# Patient Record
Sex: FEMALE | Race: WHITE | NOT HISPANIC OR LATINO | Employment: OTHER | URBAN - METROPOLITAN AREA
[De-identification: names, ages, dates, MRNs, and addresses within clinical notes are randomized per-mention and may not be internally consistent; named-entity substitution may affect disease eponyms.]

---

## 2017-01-04 ENCOUNTER — TELEPHONE (OUTPATIENT)
Dept: PHYSICAL MEDICINE AND REHAB | Facility: CLINIC | Age: 72
End: 2017-01-04

## 2017-01-04 NOTE — TELEPHONE ENCOUNTER
----- Message from Tahira Owen MA sent at 1/4/2017 11:40 AM CST -----  Contact: self      ----- Message -----     From: Linda Perez MA     Sent: 1/4/2017   9:06 AM       To: Pushpa Mcmahon MD, Tahira Owen MA        ----- Message -----     From: Cari Duncan     Sent: 1/4/2017   8:55 AM       To: [facility] Staff    Patient scheduled for MRI.  Upon asking patient questions from questionaire  Pt states has metal screw in neck and back.  I  was advised by MRI department to sent message to doctor.     Please call pt at [phone]

## 2017-01-06 DIAGNOSIS — G89.29 CHRONIC MIDLINE LOW BACK PAIN WITH BILATERAL SCIATICA: ICD-10-CM

## 2017-01-06 DIAGNOSIS — G89.29 CHRONIC MIDLINE LOW BACK PAIN WITH RIGHT-SIDED SCIATICA: ICD-10-CM

## 2017-01-06 DIAGNOSIS — M54.41 CHRONIC MIDLINE LOW BACK PAIN WITH RIGHT-SIDED SCIATICA: ICD-10-CM

## 2017-01-06 DIAGNOSIS — M54.2 CHRONIC NECK PAIN: ICD-10-CM

## 2017-01-06 DIAGNOSIS — M16.0 PRIMARY OSTEOARTHRITIS OF BOTH HIPS: ICD-10-CM

## 2017-01-06 DIAGNOSIS — M54.42 CHRONIC MIDLINE LOW BACK PAIN WITH BILATERAL SCIATICA: ICD-10-CM

## 2017-01-06 DIAGNOSIS — G89.29 CHRONIC NECK PAIN: ICD-10-CM

## 2017-01-06 DIAGNOSIS — M17.0 PRIMARY OSTEOARTHRITIS OF BOTH KNEES: ICD-10-CM

## 2017-01-06 DIAGNOSIS — M75.51 SUBDELTOID BURSITIS, RIGHT: ICD-10-CM

## 2017-01-06 DIAGNOSIS — M54.41 CHRONIC MIDLINE LOW BACK PAIN WITH BILATERAL SCIATICA: ICD-10-CM

## 2017-01-06 RX ORDER — OXYCODONE AND ACETAMINOPHEN 10; 325 MG/1; MG/1
TABLET ORAL
Qty: 120 TABLET | Refills: 0 | Status: SHIPPED | OUTPATIENT
Start: 2017-01-06 | End: 2017-02-09 | Stop reason: SDUPTHER

## 2017-02-09 DIAGNOSIS — M54.41 CHRONIC MIDLINE LOW BACK PAIN WITH BILATERAL SCIATICA: ICD-10-CM

## 2017-02-09 DIAGNOSIS — M16.0 PRIMARY OSTEOARTHRITIS OF BOTH HIPS: ICD-10-CM

## 2017-02-09 DIAGNOSIS — M54.2 CHRONIC NECK PAIN: ICD-10-CM

## 2017-02-09 DIAGNOSIS — M75.51 SUBDELTOID BURSITIS, RIGHT: ICD-10-CM

## 2017-02-09 DIAGNOSIS — G89.29 CHRONIC MIDLINE LOW BACK PAIN WITH BILATERAL SCIATICA: ICD-10-CM

## 2017-02-09 DIAGNOSIS — M17.0 PRIMARY OSTEOARTHRITIS OF BOTH KNEES: ICD-10-CM

## 2017-02-09 DIAGNOSIS — M54.41 CHRONIC MIDLINE LOW BACK PAIN WITH RIGHT-SIDED SCIATICA: ICD-10-CM

## 2017-02-09 DIAGNOSIS — M54.42 CHRONIC MIDLINE LOW BACK PAIN WITH BILATERAL SCIATICA: ICD-10-CM

## 2017-02-09 DIAGNOSIS — G89.29 CHRONIC MIDLINE LOW BACK PAIN WITH RIGHT-SIDED SCIATICA: ICD-10-CM

## 2017-02-09 DIAGNOSIS — G89.29 CHRONIC NECK PAIN: ICD-10-CM

## 2017-02-09 RX ORDER — TRAZODONE HYDROCHLORIDE 50 MG/1
TABLET ORAL
Qty: 60 TABLET | Refills: 1 | Status: SHIPPED | OUTPATIENT
Start: 2017-02-09 | End: 2017-06-09 | Stop reason: SDUPTHER

## 2017-02-09 RX ORDER — OXYCODONE AND ACETAMINOPHEN 10; 325 MG/1; MG/1
TABLET ORAL
Qty: 120 TABLET | Refills: 0 | Status: SHIPPED | OUTPATIENT
Start: 2017-02-09 | End: 2017-03-02 | Stop reason: SDUPTHER

## 2017-03-02 ENCOUNTER — OFFICE VISIT (OUTPATIENT)
Dept: PHYSICAL MEDICINE AND REHAB | Facility: CLINIC | Age: 72
End: 2017-03-02
Payer: MEDICARE

## 2017-03-02 VITALS
HEART RATE: 107 BPM | WEIGHT: 132.25 LBS | SYSTOLIC BLOOD PRESSURE: 158 MMHG | DIASTOLIC BLOOD PRESSURE: 82 MMHG | HEIGHT: 62 IN | BODY MASS INDEX: 24.34 KG/M2

## 2017-03-02 DIAGNOSIS — M17.0 PRIMARY OSTEOARTHRITIS OF BOTH KNEES: ICD-10-CM

## 2017-03-02 DIAGNOSIS — G89.29 CHRONIC NECK PAIN: ICD-10-CM

## 2017-03-02 DIAGNOSIS — M54.41 CHRONIC MIDLINE LOW BACK PAIN WITH RIGHT-SIDED SCIATICA: Primary | ICD-10-CM

## 2017-03-02 DIAGNOSIS — M96.1 CERVICAL POST-LAMINECTOMY SYNDROME: ICD-10-CM

## 2017-03-02 DIAGNOSIS — M16.0 PRIMARY OSTEOARTHRITIS OF BOTH HIPS: ICD-10-CM

## 2017-03-02 DIAGNOSIS — M54.16 RIGHT LUMBAR RADICULOPATHY: ICD-10-CM

## 2017-03-02 DIAGNOSIS — M54.2 CHRONIC NECK PAIN: ICD-10-CM

## 2017-03-02 DIAGNOSIS — M47.26 OSTEOARTHRITIS OF SPINE WITH RADICULOPATHY, LUMBAR REGION: ICD-10-CM

## 2017-03-02 DIAGNOSIS — M96.1 LUMBAR POSTLAMINECTOMY SYNDROME: ICD-10-CM

## 2017-03-02 DIAGNOSIS — G89.29 CHRONIC MIDLINE LOW BACK PAIN WITH RIGHT-SIDED SCIATICA: Primary | ICD-10-CM

## 2017-03-02 DIAGNOSIS — M75.51 SUBDELTOID BURSITIS, RIGHT: ICD-10-CM

## 2017-03-02 PROCEDURE — 1160F RVW MEDS BY RX/DR IN RCRD: CPT | Mod: S$GLB,,, | Performed by: PHYSICAL MEDICINE & REHABILITATION

## 2017-03-02 PROCEDURE — 1125F AMNT PAIN NOTED PAIN PRSNT: CPT | Mod: S$GLB,,, | Performed by: PHYSICAL MEDICINE & REHABILITATION

## 2017-03-02 PROCEDURE — 99999 PR PBB SHADOW E&M-EST. PATIENT-LVL III: CPT | Mod: PBBFAC,,, | Performed by: PHYSICAL MEDICINE & REHABILITATION

## 2017-03-02 PROCEDURE — 1159F MED LIST DOCD IN RCRD: CPT | Mod: S$GLB,,, | Performed by: PHYSICAL MEDICINE & REHABILITATION

## 2017-03-02 PROCEDURE — 1157F ADVNC CARE PLAN IN RCRD: CPT | Mod: S$GLB,,, | Performed by: PHYSICAL MEDICINE & REHABILITATION

## 2017-03-02 PROCEDURE — 99214 OFFICE O/P EST MOD 30 MIN: CPT | Mod: S$GLB,,, | Performed by: PHYSICAL MEDICINE & REHABILITATION

## 2017-03-02 RX ORDER — DULOXETIN HYDROCHLORIDE 30 MG/1
30 CAPSULE, DELAYED RELEASE ORAL DAILY
Qty: 30 CAPSULE | Refills: 1 | Status: SHIPPED | OUTPATIENT
Start: 2017-03-02 | End: 2017-09-07 | Stop reason: SINTOL

## 2017-03-02 RX ORDER — OXYCODONE AND ACETAMINOPHEN 10; 325 MG/1; MG/1
1 TABLET ORAL EVERY 6 HOURS
Qty: 120 TABLET | Refills: 0 | Status: SHIPPED | OUTPATIENT
Start: 2017-03-10 | End: 2017-04-12 | Stop reason: SDUPTHER

## 2017-03-02 NOTE — PROGRESS NOTES
Subjective:       Patient ID: Katie Quevedo is a 71 y.o. female.    Chief Complaint: No chief complaint on file.    HPI    HISTORY OF PRESENT ILLNESS:  Mrs. Quevedo is a 71-year-old white female who is   [facility]ed up in [facility] for chronic low back pain with   lumbar radiculopathy, post-laminectomy syndrome, chronic neck pain with cervical   radiculopathy, post-cervical laminectomy syndrome and recurrent right   subdeltoid bursitis.  Her last visit to the clinic was on 12/01/2016.  Her right   shoulder pain was worse.  An MRI of the right shoulder was ordered.  She was   maintained on venlafaxine, p.r.n. oxycodone/APAP, p.r.n. trazodone and p.r.n.   tizanidine.    The patient is coming today to the clinic for followup.  She is still taking   care of her sick father, which places a burden on her.  Her right shoulder pain   has been stable.  She did not have the chance to get MRI because she cannot   afford the time to take off from taking care of her father.  Her pain is in the   deltoid region.  It is a constant aching sensation.  It is aggravated by   shoulder movement and better with rest.  Her maximum pain is 10/10 and minimum   2/10.  Today, it is 2/10.    Her back pain has been stable.  It is a constant aching pain in the lumbar spine   and across her back.  She has occasional shooting pain to the right foot with   numbness.  Her pain is worse with activity and better with rest.  Her maximum   back pain is 10/10 and minimum 5/10.  Today, it is 8/10.  The patient complains   of right lower extremity, but without change from her baseline.  She has   occasional falls but without injuries.  She has been using a rolling walker.    Her neck pain has also been stable.  It is a constant aching pain in the   cervical spine.  It is usually localized.  Her pain is worse with activity and   better with rest.  Her maximum pain is 9/10 and minimum 2-3/10.  Today, it is   2-3/10.  The patient complains of mild  right upper extremity weakness.    She is currently taking oxycodone/APAP 10/325 p.r.n., usually four times per   day.  She takes tizanidine 4 mg p.r.n., usually twice per day.  She takes   trazodone 50 mg p.r.n. for sleep, usually two tablets at bedtime.  She was   previously taking venlafaxine XR, but she stopped it secondary to having weird   dreams.      MS/HN  dd: 03/02/2017 12:17:14 (CST)  td: 03/03/2017 03:01:48 (CST)  Doc ID   #[id number]  Job ID #[id number]    CC:         Review of Systems   Constitutional: Positive for fatigue.   Eyes: Negative for visual disturbance.   Respiratory: Negative for shortness of breath.    Cardiovascular: Negative for chest pain.   Gastrointestinal: Negative for constipation, nausea and vomiting.   Genitourinary: Negative for difficulty urinating.   Musculoskeletal: Positive for back pain and neck pain. Negative for gait problem.   Neurological: Negative for dizziness and headaches.   Psychiatric/Behavioral: Positive for sleep disturbance. Negative for behavioral problems.       Objective:      Physical Exam   Constitutional: She is oriented to person, place, and time. She appears well-developed and well-nourished.   Neck:   Decreased ROM.  Mild tenderness.   Musculoskeletal:   BUE:  ROM: decreased at shoulder abduction, Rt worse than Lt.  +ve Heberden's & Nilsa's nodes.  Strength:    RUE: 3+/5 at shoulder abduction, 5- elbow flexion, elbow extension, hand .   LUE: 4/5 at shoulder abduction, 5- elbow flexion, elbow extension, hand .  Sensation to pinprick:   RUE: mild decrease.   LUE: intact.    Impingement Signs:  Neer:  RUE: +ve    LUE: -ve  Lewis: RUE: +ve    LUE: -ve     BLE:  ROM:full.  +ve bilateral knee crepitus.   Strength:      RLE: 5/5 at hip flexion, knee extension, ankle DF/PF, EHL.     LLE:  5/5 at hip flexion, knee extension, ankle DF/PF, EHL.  Sensation to pinprick:     RLE: mild decrease.      LLE: mild decrease.   +ve tenderness over lumbar spine.      Neurological: She is alert and oriented to person, place, and time.   Psychiatric: She has a normal mood and affect.   Vitals reviewed.        Assessment:       1. Chronic midline low back pain with bilateral sciatica    2. Osteoarthritis of spine with radiculopathy, lumbar region    3. Right lumbar radiculopathy    4. Lumbar postlaminectomy syndrome    5. Cervical radiculopathy, BUE    6. Cervical post-laminectomy syndrome    7. Primary osteoarthritis of both knees    8. Primary osteoarthritis of both hips    9. Subdeltoid bursitis, right        Plan:     - Discontinue venlafaxine (due to 'weird dreams').  - Start duloxetine (CYMBALTA) 30 MG capsule; Take 1 capsule (30 mg total) by mouth once daily. In 1-2 weeks, if no relief, may increase dose to 2 capsules once daily. Call for refills.  - Continue oxycodone-acetaminophen (PERCOCET)  mg per tablet; Take 1 tablet by mouth every 6 (six) hours.  - A prescription for a rollator walker was given to the patient to help improve gait safety.  - Continue trazodone (DESYREL) 50 MG tablet; Take 1-2 tablets ( mg total) by mouth nightly as needed for Insomnia.  - Continue tizanidine (ZANAFLEX) 4 MG tablet; Take 1-2 tablets (4-8 mg total) by mouth 3 (three) times daily as needed.  - Regular home exercise program was encouraged.  - She can call for MRI of Right shoulder when ready.  - Return in about 3 months (around 6/2/2017).

## 2017-03-11 DIAGNOSIS — M75.51 SUBDELTOID BURSITIS, RIGHT: ICD-10-CM

## 2017-03-11 DIAGNOSIS — M54.2 CHRONIC NECK PAIN: ICD-10-CM

## 2017-03-11 DIAGNOSIS — G89.29 CHRONIC NECK PAIN: ICD-10-CM

## 2017-03-13 RX ORDER — TIZANIDINE 4 MG/1
TABLET ORAL
Qty: 120 TABLET | Refills: 1 | Status: SHIPPED | OUTPATIENT
Start: 2017-03-13 | End: 2017-05-02 | Stop reason: SDUPTHER

## 2017-04-12 DIAGNOSIS — M75.51 SUBDELTOID BURSITIS, RIGHT: ICD-10-CM

## 2017-04-12 DIAGNOSIS — M54.41 CHRONIC MIDLINE LOW BACK PAIN WITH RIGHT-SIDED SCIATICA: ICD-10-CM

## 2017-04-12 DIAGNOSIS — M54.2 CHRONIC NECK PAIN: ICD-10-CM

## 2017-04-12 DIAGNOSIS — M17.0 PRIMARY OSTEOARTHRITIS OF BOTH KNEES: ICD-10-CM

## 2017-04-12 DIAGNOSIS — G89.29 CHRONIC MIDLINE LOW BACK PAIN WITH RIGHT-SIDED SCIATICA: ICD-10-CM

## 2017-04-12 DIAGNOSIS — M16.0 PRIMARY OSTEOARTHRITIS OF BOTH HIPS: ICD-10-CM

## 2017-04-12 DIAGNOSIS — G89.29 CHRONIC NECK PAIN: ICD-10-CM

## 2017-04-12 NOTE — TELEPHONE ENCOUNTER
03/02/17 last Office visit  03/02/17 last Rx refill  06/05/17 RTC    Pt is calling for a refill of oxycodone-acetaminophen (PERCOCET)  mg.thanks     [facility] - [facility] - [address]   [address]   [place] [address]   Phone: [phone] Fax: [phone]

## 2017-04-13 ENCOUNTER — PATIENT MESSAGE (OUTPATIENT)
Dept: PHYSICAL MEDICINE AND REHAB | Facility: CLINIC | Age: 72
End: 2017-04-13

## 2017-04-13 RX ORDER — OXYCODONE AND ACETAMINOPHEN 10; 325 MG/1; MG/1
1 TABLET ORAL EVERY 6 HOURS
Qty: 120 TABLET | Refills: 0 | Status: SHIPPED | OUTPATIENT
Start: 2017-04-13 | End: 2017-05-12 | Stop reason: SDUPTHER

## 2017-05-02 DIAGNOSIS — M54.2 CHRONIC NECK PAIN: ICD-10-CM

## 2017-05-02 DIAGNOSIS — M75.51 SUBDELTOID BURSITIS, RIGHT: ICD-10-CM

## 2017-05-02 DIAGNOSIS — G89.29 CHRONIC NECK PAIN: ICD-10-CM

## 2017-05-02 RX ORDER — TIZANIDINE 4 MG/1
TABLET ORAL
Qty: 120 TABLET | Refills: 1 | Status: SHIPPED | OUTPATIENT
Start: 2017-05-02 | End: 2017-12-08 | Stop reason: SDUPTHER

## 2017-05-12 DIAGNOSIS — M54.2 CHRONIC NECK PAIN: ICD-10-CM

## 2017-05-12 DIAGNOSIS — G89.29 CHRONIC MIDLINE LOW BACK PAIN WITH RIGHT-SIDED SCIATICA: ICD-10-CM

## 2017-05-12 DIAGNOSIS — G89.29 CHRONIC NECK PAIN: ICD-10-CM

## 2017-05-12 DIAGNOSIS — M17.0 PRIMARY OSTEOARTHRITIS OF BOTH KNEES: ICD-10-CM

## 2017-05-12 DIAGNOSIS — M75.51 SUBDELTOID BURSITIS, RIGHT: ICD-10-CM

## 2017-05-12 DIAGNOSIS — M16.0 PRIMARY OSTEOARTHRITIS OF BOTH HIPS: ICD-10-CM

## 2017-05-12 DIAGNOSIS — M54.41 CHRONIC MIDLINE LOW BACK PAIN WITH RIGHT-SIDED SCIATICA: ICD-10-CM

## 2017-05-12 RX ORDER — OXYCODONE AND ACETAMINOPHEN 10; 325 MG/1; MG/1
1 TABLET ORAL EVERY 6 HOURS
Qty: 120 TABLET | Refills: 0 | Status: SHIPPED | OUTPATIENT
Start: 2017-05-13 | End: 2017-06-09 | Stop reason: SDUPTHER

## 2017-06-09 ENCOUNTER — OFFICE VISIT (OUTPATIENT)
Dept: PHYSICAL MEDICINE AND REHAB | Facility: CLINIC | Age: 72
End: 2017-06-09
Payer: MEDICARE

## 2017-06-09 VITALS
HEIGHT: 62 IN | BODY MASS INDEX: 24.75 KG/M2 | SYSTOLIC BLOOD PRESSURE: 139 MMHG | HEART RATE: 134 BPM | WEIGHT: 134.5 LBS | DIASTOLIC BLOOD PRESSURE: 99 MMHG

## 2017-06-09 DIAGNOSIS — G89.29 CHRONIC RIGHT SHOULDER PAIN: ICD-10-CM

## 2017-06-09 DIAGNOSIS — M96.1 LUMBAR POSTLAMINECTOMY SYNDROME: ICD-10-CM

## 2017-06-09 DIAGNOSIS — M17.0 PRIMARY OSTEOARTHRITIS OF BOTH KNEES: ICD-10-CM

## 2017-06-09 DIAGNOSIS — M47.26 OSTEOARTHRITIS OF SPINE WITH RADICULOPATHY, LUMBAR REGION: ICD-10-CM

## 2017-06-09 DIAGNOSIS — G89.29 CHRONIC MIDLINE LOW BACK PAIN WITH RIGHT-SIDED SCIATICA: Primary | ICD-10-CM

## 2017-06-09 DIAGNOSIS — M25.511 CHRONIC RIGHT SHOULDER PAIN: ICD-10-CM

## 2017-06-09 DIAGNOSIS — M16.0 PRIMARY OSTEOARTHRITIS OF BOTH HIPS: ICD-10-CM

## 2017-06-09 DIAGNOSIS — M75.51 SUBDELTOID BURSITIS, RIGHT: ICD-10-CM

## 2017-06-09 DIAGNOSIS — G89.29 CHRONIC NECK PAIN: ICD-10-CM

## 2017-06-09 DIAGNOSIS — M96.1 CERVICAL POST-LAMINECTOMY SYNDROME: ICD-10-CM

## 2017-06-09 DIAGNOSIS — M54.41 CHRONIC MIDLINE LOW BACK PAIN WITH RIGHT-SIDED SCIATICA: Primary | ICD-10-CM

## 2017-06-09 DIAGNOSIS — M54.2 CHRONIC NECK PAIN: ICD-10-CM

## 2017-06-09 PROCEDURE — 99214 OFFICE O/P EST MOD 30 MIN: CPT | Mod: S$GLB,,, | Performed by: PHYSICAL MEDICINE & REHABILITATION

## 2017-06-09 PROCEDURE — 99999 PR PBB SHADOW E&M-EST. PATIENT-LVL III: CPT | Mod: PBBFAC,,, | Performed by: PHYSICAL MEDICINE & REHABILITATION

## 2017-06-09 PROCEDURE — 1159F MED LIST DOCD IN RCRD: CPT | Mod: S$GLB,,, | Performed by: PHYSICAL MEDICINE & REHABILITATION

## 2017-06-09 RX ORDER — OXYCODONE AND ACETAMINOPHEN 10; 325 MG/1; MG/1
1 TABLET ORAL EVERY 6 HOURS
Qty: 120 TABLET | Refills: 0 | Status: SHIPPED | OUTPATIENT
Start: 2017-06-10 | End: 2017-07-10 | Stop reason: SDUPTHER

## 2017-06-09 RX ORDER — TRAZODONE HYDROCHLORIDE 50 MG/1
TABLET ORAL
Qty: 60 TABLET | Refills: 3 | Status: SHIPPED | OUTPATIENT
Start: 2017-06-09 | End: 2018-03-14

## 2017-06-09 NOTE — PROGRESS NOTES
Subjective:       Patient ID: Katie Quevedo is a 72 y.o. female.    Chief Complaint: No chief complaint on file.    HPI    HISTORY OF PRESENT ILLNESS:  Mrs. Quevedo is a 72-year-old white female who is   followed up in the [facility] for chronic low back pain with   lumbar radiculopathy, post-laminectomy syndrome, chronic neck pain with cervical   radiculopathy, post-cervical laminectomy syndrome and recurrent right   subdeltoid bursitis.  Her last visit was on 03/02/2017.  She was maintained on   Cymbalta, p.r.n. oxycodone/APAP, p.r.n. tizanidine and p.r.n. trazodone.  An MRI   of the right shoulder was ordered.  However, the patient was able to make it   for the MRI.    She is coming today to the clinic for followup.  She continues to complain of   right shoulder pain.  It is an intermittent aching pain in the deltoid region.    It is worse with shoulder movement and better with rest.  Her maximum pain is   9-10/10 and minimum 4/10.  Today, it is 6/10.    Her back pain has been stable.  It is a constant aching pain in the lumbar   spine.  The pain radiates to the right foot with numbness.  Her pain is worse   with activity and better with rest.  Her maximum pain is 10/10 and minimum 5/10.    Today, it is 6/10.  The patient complains of chronic lower extremity weakness.    She denies any bowel or bladder incontinence.    Her neck pain has been stable.  It is a constant aching pain in the cervical   spine.  She has occasional radiation to the right hand with numbness.  Her pain   is worse with activity and better with rest.  Her maximum pain is 9-10/10 and   minimum 4/10.  Today, it is 7/10.  The patient complains of mild right upper   extremity weakness.    She is currently taking Cymbalta 30 mg p.o. twice per day.  She is still taking   Paxil 30 mg p.o. twice per day, prescribed by her psychiatrist.  She takes   oxycodone/APAP 10/325 p.r.n., usually four times per day.  She takes tizanidine   4 mg  p.r.n., usually twice per day.  She takes trazodone p.r.n. for sleep.      MS/HN  dd: 06/09/2017 12:14:28 (CDT)  td: 06/10/2017 07:16:40 (CDT)  Doc ID   #[id number]  Job ID #[id number]    CC:           Review of Systems   Constitutional: Positive for fatigue.   Eyes: Negative for visual disturbance.   Respiratory: Negative for shortness of breath.    Cardiovascular: Negative for chest pain.   Gastrointestinal: Negative for constipation, nausea and vomiting.   Genitourinary: Negative for difficulty urinating.   Musculoskeletal: Positive for back pain and neck pain. Negative for gait problem.   Neurological: Negative for dizziness and headaches.   Psychiatric/Behavioral: Positive for sleep disturbance. Negative for behavioral problems.       Objective:      Physical Exam   Constitutional: She is oriented to person, place, and time. She appears well-developed and well-nourished.   Neck:   Decreased ROM.  Mild tenderness.   Musculoskeletal:   BUE:  ROM: decreased at shoulder abduction, Rt worse than Lt.  +ve Heberden's & Nilsa's nodes.  Strength:    RUE: 3+/5 at shoulder abduction, 5- elbow flexion, elbow extension, hand .   LUE: 4/5 at shoulder abduction, 5- elbow flexion, elbow extension, hand .  Sensation to pinprick:   RUE: mild decrease.   LUE: intact.    Impingement Signs:  Neer:  RUE: +ve    LUE: -ve  Lewis: RUE: +ve    LUE: -ve     BLE:  ROM:full.  +ve bilateral knee crepitus.   Strength:      RLE: 5/5 at hip flexion, knee extension, ankle DF/PF, EHL.     LLE:  5/5 at hip flexion, knee extension, ankle DF/PF, EHL.  Sensation to pinprick:     RLE: mild decrease.      LLE: mild decrease.   +ve tenderness over lumbar spine.     Neurological: She is alert and oriented to person, place, and time.   Psychiatric: She has a normal mood and affect.   Vitals reviewed.        Assessment:       1. Chronic midline low back pain with right-sided sciatica    2. Osteoarthritis of spine with radiculopathy, lumbar region     3. Lumbar postlaminectomy syndrome    4. Chronic neck pain    5. Cervical post-laminectomy syndrome    6. Primary osteoarthritis of both knees    7. Primary osteoarthritis of both hips    8. Subdeltoid bursitis, right        Plan:     - MRI Upper Extremity Joint Without Contrast Right; Future  - Continue duloxetine (CYMBALTA) 30 MG capsule; Take 1 capsule (30 mg total) by mouth twice daily. She was asked to check with her Psychiatrist if OK to stop Paxil.  - Continue oxycodone-acetaminophen (PERCOCET)  mg per tablet; Take 1 tablet by mouth every 6 (six) hours.  - Continue trazodone (DESYREL) 50 MG tablet; Take 1-2 tablets ( mg total) by mouth nightly as needed for Insomnia.  - Continue tizanidine (ZANAFLEX) 4 MG tablet; Take 1-2 tablets (4-8 mg total) by mouth 3 (three) times daily as needed.  - Regular home exercise program was encouraged.  - Return in about 3 months (around 9/9/2017).

## 2017-07-10 DIAGNOSIS — G89.29 CHRONIC MIDLINE LOW BACK PAIN WITH RIGHT-SIDED SCIATICA: ICD-10-CM

## 2017-07-10 DIAGNOSIS — M17.0 PRIMARY OSTEOARTHRITIS OF BOTH KNEES: ICD-10-CM

## 2017-07-10 DIAGNOSIS — M54.41 CHRONIC MIDLINE LOW BACK PAIN WITH RIGHT-SIDED SCIATICA: ICD-10-CM

## 2017-07-10 DIAGNOSIS — M16.0 PRIMARY OSTEOARTHRITIS OF BOTH HIPS: ICD-10-CM

## 2017-07-10 DIAGNOSIS — M54.2 CHRONIC NECK PAIN: ICD-10-CM

## 2017-07-10 DIAGNOSIS — G89.29 CHRONIC NECK PAIN: ICD-10-CM

## 2017-07-10 DIAGNOSIS — M75.51 SUBDELTOID BURSITIS, RIGHT: ICD-10-CM

## 2017-07-11 RX ORDER — OXYCODONE AND ACETAMINOPHEN 10; 325 MG/1; MG/1
1 TABLET ORAL EVERY 6 HOURS
Qty: 120 TABLET | Refills: 0 | Status: SHIPPED | OUTPATIENT
Start: 2017-07-11 | End: 2017-08-14 | Stop reason: SDUPTHER

## 2017-08-14 DIAGNOSIS — M54.2 CHRONIC NECK PAIN: ICD-10-CM

## 2017-08-14 DIAGNOSIS — G89.29 CHRONIC NECK PAIN: ICD-10-CM

## 2017-08-14 DIAGNOSIS — M16.0 PRIMARY OSTEOARTHRITIS OF BOTH HIPS: ICD-10-CM

## 2017-08-14 DIAGNOSIS — G89.29 CHRONIC MIDLINE LOW BACK PAIN WITH RIGHT-SIDED SCIATICA: ICD-10-CM

## 2017-08-14 DIAGNOSIS — M75.51 SUBDELTOID BURSITIS, RIGHT: ICD-10-CM

## 2017-08-14 DIAGNOSIS — M17.0 PRIMARY OSTEOARTHRITIS OF BOTH KNEES: ICD-10-CM

## 2017-08-14 DIAGNOSIS — M54.41 CHRONIC MIDLINE LOW BACK PAIN WITH RIGHT-SIDED SCIATICA: ICD-10-CM

## 2017-08-14 RX ORDER — OXYCODONE AND ACETAMINOPHEN 10; 325 MG/1; MG/1
1 TABLET ORAL EVERY 6 HOURS
Qty: 120 TABLET | Refills: 0 | Status: SHIPPED | OUTPATIENT
Start: 2017-08-14 | End: 2017-09-07 | Stop reason: SDUPTHER

## 2017-08-29 ENCOUNTER — TELEPHONE (OUTPATIENT)
Dept: PHYSICAL MEDICINE AND REHAB | Facility: CLINIC | Age: 72
End: 2017-08-29

## 2017-08-29 NOTE — TELEPHONE ENCOUNTER
I called the pt.  She fell twice while using the walker.  She does not think she has any broken bones, but the fall made her back pain worse.  I told her we can discuss her pain management at follow up 9/7/17.

## 2017-09-07 ENCOUNTER — OFFICE VISIT (OUTPATIENT)
Dept: PHYSICAL MEDICINE AND REHAB | Facility: CLINIC | Age: 72
End: 2017-09-07
Payer: MEDICARE

## 2017-09-07 VITALS
HEIGHT: 62 IN | SYSTOLIC BLOOD PRESSURE: 187 MMHG | DIASTOLIC BLOOD PRESSURE: 92 MMHG | HEART RATE: 112 BPM | WEIGHT: 132.25 LBS | BODY MASS INDEX: 24.34 KG/M2

## 2017-09-07 DIAGNOSIS — M17.0 PRIMARY OSTEOARTHRITIS OF BOTH KNEES: ICD-10-CM

## 2017-09-07 DIAGNOSIS — M16.0 PRIMARY OSTEOARTHRITIS OF BOTH HIPS: ICD-10-CM

## 2017-09-07 DIAGNOSIS — M75.51 SUBDELTOID BURSITIS, RIGHT: ICD-10-CM

## 2017-09-07 DIAGNOSIS — G89.29 CHRONIC NECK PAIN: ICD-10-CM

## 2017-09-07 DIAGNOSIS — M96.1 LUMBAR POSTLAMINECTOMY SYNDROME: ICD-10-CM

## 2017-09-07 DIAGNOSIS — M54.2 CHRONIC NECK PAIN: ICD-10-CM

## 2017-09-07 DIAGNOSIS — G89.29 CHRONIC MIDLINE LOW BACK PAIN WITH RIGHT-SIDED SCIATICA: Primary | ICD-10-CM

## 2017-09-07 DIAGNOSIS — M54.41 CHRONIC MIDLINE LOW BACK PAIN WITH RIGHT-SIDED SCIATICA: Primary | ICD-10-CM

## 2017-09-07 DIAGNOSIS — M47.26 OSTEOARTHRITIS OF SPINE WITH RADICULOPATHY, LUMBAR REGION: ICD-10-CM

## 2017-09-07 DIAGNOSIS — M96.1 CERVICAL POST-LAMINECTOMY SYNDROME: ICD-10-CM

## 2017-09-07 PROCEDURE — 99999 PR PBB SHADOW E&M-EST. PATIENT-LVL II: CPT | Mod: PBBFAC,,, | Performed by: PHYSICAL MEDICINE & REHABILITATION

## 2017-09-07 PROCEDURE — 3008F BODY MASS INDEX DOCD: CPT | Mod: S$GLB,,, | Performed by: PHYSICAL MEDICINE & REHABILITATION

## 2017-09-07 PROCEDURE — 1159F MED LIST DOCD IN RCRD: CPT | Mod: S$GLB,,, | Performed by: PHYSICAL MEDICINE & REHABILITATION

## 2017-09-07 PROCEDURE — 99214 OFFICE O/P EST MOD 30 MIN: CPT | Mod: S$GLB,,, | Performed by: PHYSICAL MEDICINE & REHABILITATION

## 2017-09-07 PROCEDURE — 1125F AMNT PAIN NOTED PAIN PRSNT: CPT | Mod: S$GLB,,, | Performed by: PHYSICAL MEDICINE & REHABILITATION

## 2017-09-07 RX ORDER — MORPHINE SULFATE 15 MG/1
15 TABLET, FILM COATED, EXTENDED RELEASE ORAL 2 TIMES DAILY
Qty: 60 TABLET | Refills: 0 | Status: ON HOLD | OUTPATIENT
Start: 2017-09-07 | End: 2017-10-19 | Stop reason: HOSPADM

## 2017-09-07 RX ORDER — OXYCODONE AND ACETAMINOPHEN 10; 325 MG/1; MG/1
1 TABLET ORAL 3 TIMES DAILY PRN
Qty: 90 TABLET | Refills: 0 | Status: SHIPPED | OUTPATIENT
Start: 2017-09-08 | End: 2017-10-13 | Stop reason: SDUPTHER

## 2017-09-07 NOTE — PROGRESS NOTES
Subjective:       Patient ID: Katie Quevedo is a 72 y.o. female.    Chief Complaint: No chief complaint on file.    HPI    HISTORY OF PRESENT ILLNESS:  Mrs. Quevedo is a 72-year-old white female who is   followed up in the [facility] for chronic low back pain with   lumbar radiculopathy, post-laminectomy syndrome, chronic neck pain with cervical   radiculopathy, post-cervical laminectomy syndrome and recurrent right   subdeltoid bursitis.  Her last visit was on 06/09/2017.  She was maintained on   Cymbalta, p.r.n. oxycodone/APAP, p.r.n. tizanidine and p.r.n. trazodone.    The patient is coming today to the clinic for followup.  Her back pain has been   stable.  It is an almost constant aching pain in the lumbar spine and across her   back.  The patient shoots to both hips, more on the left side.  The pain also   radiates intermittently to the right foot with numbness.  Her pain is worse with   activity and better with rest.  Her maximum pain is 9-10/10 and minimum 3/10.    Today, it is 9/10.  The patient complains of mild lower extremity weakness.  She   denies any bowel or bladder incontinence.    Her neck pain has been stable.  It is an intermittent aching pain in the   cervical spine.  She has occasional radiation to the right hand with numbness.    Her pain is worse with activity and better with rest.  Her maximum pain is   8-9/10 and minimum 2/10.  Today, it is 8/10.  The patient complains of mild   right upper extremity weakness.    She continues to complain of right shoulder pain.  An MRI of the shoulder was   ordered on last visit, but was not done due to illness in her family.  Her pain   is worse with movement.  Her maximum pain is 9-10/10 and minimum 3-4/10.  Today,   it is 3-4/10.    She is currently taking oxycodone/APAP 10/325 p.r.n., usually four times but   occasionally more.  She takes tizanidine 4 mg p.r.n., usually twice per day.    She takes trazodone 50 mg p.r.n., usually two tablets  at bedtime to help with   sleep.  She was taking Cymbalta but stopped it secondary to being groggy.      MS/HN  dd: 09/07/2017 15:12:10 (CDT)  td: 09/08/2017 00:46:22 (CDT)  Doc ID   #[id number]  Job ID #[id number]    CC:         Review of Systems   Constitutional: Positive for fatigue.   Eyes: Negative for visual disturbance.   Respiratory: Negative for shortness of breath.    Cardiovascular: Negative for chest pain.   Gastrointestinal: Negative for constipation, nausea and vomiting.   Genitourinary: Negative for difficulty urinating.   Musculoskeletal: Positive for back pain and neck pain. Negative for gait problem.   Neurological: Negative for dizziness and headaches.   Psychiatric/Behavioral: Positive for sleep disturbance. Negative for behavioral problems.       Objective:      Physical Exam   Constitutional: She is oriented to person, place, and time. She appears well-developed and well-nourished.   Neck:   Decreased ROM.  Mild tenderness.   Musculoskeletal:   BUE:  ROM: decreased at shoulder abduction, Rt worse than Lt.  +ve Heberden's & Nilsa's nodes.  Strength:    RUE: 3+/5 at shoulder abduction, 5- elbow flexion, elbow extension, hand .   LUE: 4/5 at shoulder abduction, 5- elbow flexion, elbow extension, hand .  Sensation to pinprick:   RUE: mild decrease.   LUE: intact.    Impingement Signs:  Neer:  RUE: +ve    LUE: -ve  Lewis: RUE: +ve    LUE: -ve     BLE:  ROM:full.  +ve bilateral knee crepitus.   Strength:      RLE: 5/5 at hip flexion, knee extension, ankle DF/PF, EHL.     LLE:  5/5 at hip flexion, knee extension, ankle DF/PF, EHL.  Sensation to pinprick:     RLE: mild decrease.      LLE: mild decrease.   +ve tenderness over lumbar spine.     Neurological: She is alert and oriented to person, place, and time.   Psychiatric: She has a normal mood and affect.   Vitals reviewed.        Assessment:       1. Chronic midline low back pain with right-sided sciatica    2. Osteoarthritis of spine with  radiculopathy, lumbar region    3. Lumbar postlaminectomy syndrome    4. Chronic neck pain    5. Cervical post-laminectomy syndrome    6. Primary osteoarthritis of both knees    7. Primary osteoarthritis of both hips    8. Subdeltoid bursitis, right        Plan:       - Duloxetine was discontinued (due to sedation).  - Restart morphine (MS CONTIN) 15 MG 12 hr tablet; Take 1 tablet (15 mg total) by mouth 2 (two) times daily.  - Decrease oxycodone-acetaminophen (PERCOCET)  mg per tablet; Take 1 tablet by mouth 3 (three) times daily as needed for Pain.  - Continue trazodone (DESYREL) 50 MG tablet; Take 1-2 tablets ( mg total) by mouth nightly as needed for Insomnia.  - Continue tizanidine (ZANAFLEX) 4 MG tablet; Take 1-2 tablets (4-8 mg total) by mouth 3 (three) times daily as needed.  - Regular home exercise program was encouraged.  - Return in about 3 months (around 12/7/2017).

## 2017-09-22 ENCOUNTER — HOSPITAL ENCOUNTER (INPATIENT)
Facility: HOSPITAL | Age: 72
LOS: 1 days | Discharge: HOME | DRG: 190 | End: 2017-09-24
Attending: EMERGENCY MEDICINE | Admitting: INTERNAL MEDICINE
Payer: MEDICARE

## 2017-09-22 DIAGNOSIS — J18.9 CAP (COMMUNITY ACQUIRED PNEUMONIA): Primary | ICD-10-CM

## 2017-09-22 DIAGNOSIS — R09.02 HYPOXIA: ICD-10-CM

## 2017-09-22 DIAGNOSIS — J44.1 COPD EXACERBATION: ICD-10-CM

## 2017-09-22 LAB
ALBUMIN SERPL BCP-MCNC: 2.8 G/DL
ALP SERPL-CCNC: 154 U/L
ALT SERPL W/O P-5'-P-CCNC: 16 U/L
ANION GAP SERPL CALC-SCNC: 14 MMOL/L
AST SERPL-CCNC: 26 U/L
BASOPHILS # BLD AUTO: 0.03 K/UL
BASOPHILS NFR BLD: 0.2 %
BILIRUB SERPL-MCNC: 0.3 MG/DL
BUN SERPL-MCNC: 22 MG/DL
CALCIUM SERPL-MCNC: 9.7 MG/DL
CHLORIDE SERPL-SCNC: 102 MMOL/L
CO2 SERPL-SCNC: 22 MMOL/L
CREAT SERPL-MCNC: 1.9 MG/DL
DIFFERENTIAL METHOD: ABNORMAL
EOSINOPHIL # BLD AUTO: 0.1 K/UL
EOSINOPHIL NFR BLD: 0.3 %
ERYTHROCYTE [DISTWIDTH] IN BLOOD BY AUTOMATED COUNT: 12.9 %
EST. GFR  (AFRICAN AMERICAN): 30 ML/MIN/1.73 M^2
EST. GFR  (NON AFRICAN AMERICAN): 26 ML/MIN/1.73 M^2
FLUAV AG SPEC QL IA: NEGATIVE
FLUBV AG SPEC QL IA: NEGATIVE
GLUCOSE SERPL-MCNC: 111 MG/DL
HCT VFR BLD AUTO: 30.3 %
HGB BLD-MCNC: 9.9 G/DL
LYMPHOCYTES # BLD AUTO: 1.7 K/UL
LYMPHOCYTES NFR BLD: 12.2 %
MCH RBC QN AUTO: 30.9 PG
MCHC RBC AUTO-ENTMCNC: 32.7 G/DL
MCV RBC AUTO: 95 FL
MONOCYTES # BLD AUTO: 1.3 K/UL
MONOCYTES NFR BLD: 9 %
NEUTROPHILS # BLD AUTO: 11 K/UL
NEUTROPHILS NFR BLD: 78.3 %
PLATELET # BLD AUTO: 521 K/UL
PMV BLD AUTO: 9 FL
POTASSIUM SERPL-SCNC: 3.7 MMOL/L
PROT SERPL-MCNC: 8.1 G/DL
RBC # BLD AUTO: 3.2 M/UL
SODIUM SERPL-SCNC: 138 MMOL/L
SPECIMEN SOURCE: NORMAL
WBC # BLD AUTO: 14.3 K/UL

## 2017-09-22 PROCEDURE — 94640 AIRWAY INHALATION TREATMENT: CPT

## 2017-09-22 PROCEDURE — 93010 ELECTROCARDIOGRAM REPORT: CPT | Mod: ,,, | Performed by: INTERNAL MEDICINE

## 2017-09-22 PROCEDURE — 85025 COMPLETE CBC W/AUTO DIFF WBC: CPT

## 2017-09-22 PROCEDURE — 25000242 PHARM REV CODE 250 ALT 637 W/ HCPCS: Performed by: EMERGENCY MEDICINE

## 2017-09-22 PROCEDURE — 96361 HYDRATE IV INFUSION ADD-ON: CPT

## 2017-09-22 PROCEDURE — 25000003 PHARM REV CODE 250: Performed by: EMERGENCY MEDICINE

## 2017-09-22 PROCEDURE — 80053 COMPREHEN METABOLIC PANEL: CPT

## 2017-09-22 PROCEDURE — 96374 THER/PROPH/DIAG INJ IV PUSH: CPT

## 2017-09-22 PROCEDURE — 99284 EMERGENCY DEPT VISIT MOD MDM: CPT | Mod: 25

## 2017-09-22 PROCEDURE — 87400 INFLUENZA A/B EACH AG IA: CPT

## 2017-09-22 PROCEDURE — 93005 ELECTROCARDIOGRAM TRACING: CPT

## 2017-09-22 PROCEDURE — 63600175 PHARM REV CODE 636 W HCPCS: Performed by: EMERGENCY MEDICINE

## 2017-09-22 RX ORDER — METHYLPREDNISOLONE SOD SUCC 125 MG
125 VIAL (EA) INJECTION
Status: COMPLETED | OUTPATIENT
Start: 2017-09-22 | End: 2017-09-22

## 2017-09-22 RX ORDER — ALBUTEROL SULFATE 0.83 MG/ML
10 SOLUTION RESPIRATORY (INHALATION)
Status: COMPLETED | OUTPATIENT
Start: 2017-09-22 | End: 2017-09-23

## 2017-09-22 RX ORDER — MOXIFLOXACIN HYDROCHLORIDE 400 MG/1
400 TABLET ORAL DAILY
Qty: 10 TABLET | Refills: 0 | Status: SHIPPED | OUTPATIENT
Start: 2017-09-22 | End: 2017-09-24 | Stop reason: HOSPADM

## 2017-09-22 RX ORDER — PREDNISONE 20 MG/1
40 TABLET ORAL DAILY
Qty: 12 TABLET | Refills: 0 | Status: SHIPPED | OUTPATIENT
Start: 2017-09-22 | End: 2017-09-24 | Stop reason: HOSPADM

## 2017-09-22 RX ORDER — MOXIFLOXACIN HYDROCHLORIDE 400 MG/1
400 TABLET ORAL
Status: COMPLETED | OUTPATIENT
Start: 2017-09-22 | End: 2017-09-22

## 2017-09-22 RX ORDER — ALBUTEROL SULFATE 0.83 MG/ML
2.5 SOLUTION RESPIRATORY (INHALATION)
Status: COMPLETED | OUTPATIENT
Start: 2017-09-22 | End: 2017-09-22

## 2017-09-22 RX ORDER — ALBUTEROL SULFATE 90 UG/1
2 AEROSOL, METERED RESPIRATORY (INHALATION) EVERY 4 HOURS PRN
Qty: 1 INHALER | Refills: 0 | Status: SHIPPED | OUTPATIENT
Start: 2017-09-22 | End: 2017-09-24 | Stop reason: HOSPADM

## 2017-09-22 RX ADMIN — METHYLPREDNISOLONE SODIUM SUCCINATE 125 MG: 125 INJECTION, POWDER, FOR SOLUTION INTRAMUSCULAR; INTRAVENOUS at 09:09

## 2017-09-22 RX ADMIN — SODIUM CHLORIDE 1000 ML: 0.9 INJECTION, SOLUTION INTRAVENOUS at 09:09

## 2017-09-22 RX ADMIN — MOXIFLOXACIN HYDROCHLORIDE 400 MG: 400 TABLET, FILM COATED ORAL at 11:09

## 2017-09-22 RX ADMIN — ALBUTEROL SULFATE 2.5 MG: 2.5 SOLUTION RESPIRATORY (INHALATION) at 09:09

## 2017-09-23 PROBLEM — J18.9 CAP (COMMUNITY ACQUIRED PNEUMONIA): Status: ACTIVE | Noted: 2017-09-23

## 2017-09-23 LAB
ALBUMIN SERPL BCP-MCNC: 2.3 G/DL
ALP SERPL-CCNC: 126 U/L
ALT SERPL W/O P-5'-P-CCNC: 13 U/L
ANION GAP SERPL CALC-SCNC: 11 MMOL/L
AST SERPL-CCNC: 18 U/L
BASOPHILS # BLD AUTO: 0.01 K/UL
BASOPHILS NFR BLD: 0.1 %
BILIRUB SERPL-MCNC: 0.2 MG/DL
BUN SERPL-MCNC: 23 MG/DL
CALCIUM SERPL-MCNC: 9 MG/DL
CHLORIDE SERPL-SCNC: 106 MMOL/L
CO2 SERPL-SCNC: 22 MMOL/L
CREAT SERPL-MCNC: 1.8 MG/DL
DIFFERENTIAL METHOD: ABNORMAL
EOSINOPHIL # BLD AUTO: 0 K/UL
EOSINOPHIL NFR BLD: 0.1 %
ERYTHROCYTE [DISTWIDTH] IN BLOOD BY AUTOMATED COUNT: 13.2 %
EST. GFR  (AFRICAN AMERICAN): 32 ML/MIN/1.73 M^2
EST. GFR  (NON AFRICAN AMERICAN): 27 ML/MIN/1.73 M^2
FERRITIN SERPL-MCNC: 209 NG/ML
FOLATE SERPL-MCNC: 15.4 NG/ML
GLUCOSE SERPL-MCNC: 175 MG/DL
HCT VFR BLD AUTO: 26.8 %
HGB BLD-MCNC: 8.5 G/DL
IRON SERPL-MCNC: 18 UG/DL
LYMPHOCYTES # BLD AUTO: 0.9 K/UL
LYMPHOCYTES NFR BLD: 7.9 %
MCH RBC QN AUTO: 30.1 PG
MCHC RBC AUTO-ENTMCNC: 31.7 G/DL
MCV RBC AUTO: 95 FL
MONOCYTES # BLD AUTO: 0.3 K/UL
MONOCYTES NFR BLD: 2.4 %
NEUTROPHILS # BLD AUTO: 9.7 K/UL
NEUTROPHILS NFR BLD: 87.5 %
PLATELET # BLD AUTO: 423 K/UL
PMV BLD AUTO: 8.7 FL
POTASSIUM SERPL-SCNC: 4.6 MMOL/L
PROT SERPL-MCNC: 6.9 G/DL
RBC # BLD AUTO: 2.82 M/UL
SATURATED IRON: 7 %
SODIUM SERPL-SCNC: 139 MMOL/L
TOTAL IRON BINDING CAPACITY: 275 UG/DL
TRANSFERRIN SERPL-MCNC: 186 MG/DL
VIT B12 SERPL-MCNC: >2000 PG/ML
WBC # BLD AUTO: 11.1 K/UL

## 2017-09-23 PROCEDURE — 11000001 HC ACUTE MED/SURG PRIVATE ROOM

## 2017-09-23 PROCEDURE — 94761 N-INVAS EAR/PLS OXIMETRY MLT: CPT

## 2017-09-23 PROCEDURE — 25000242 PHARM REV CODE 250 ALT 637 W/ HCPCS: Performed by: EMERGENCY MEDICINE

## 2017-09-23 PROCEDURE — 27000221 HC OXYGEN, UP TO 24 HOURS

## 2017-09-23 PROCEDURE — 83540 ASSAY OF IRON: CPT

## 2017-09-23 PROCEDURE — 85025 COMPLETE CBC W/AUTO DIFF WBC: CPT

## 2017-09-23 PROCEDURE — 82607 VITAMIN B-12: CPT

## 2017-09-23 PROCEDURE — 63600175 PHARM REV CODE 636 W HCPCS: Performed by: HOSPITALIST

## 2017-09-23 PROCEDURE — 36415 COLL VENOUS BLD VENIPUNCTURE: CPT

## 2017-09-23 PROCEDURE — 82728 ASSAY OF FERRITIN: CPT

## 2017-09-23 PROCEDURE — 94640 AIRWAY INHALATION TREATMENT: CPT

## 2017-09-23 PROCEDURE — 25000003 PHARM REV CODE 250: Performed by: HOSPITALIST

## 2017-09-23 PROCEDURE — 82746 ASSAY OF FOLIC ACID SERUM: CPT

## 2017-09-23 PROCEDURE — 80053 COMPREHEN METABOLIC PANEL: CPT

## 2017-09-23 PROCEDURE — 25000242 PHARM REV CODE 250 ALT 637 W/ HCPCS: Performed by: HOSPITALIST

## 2017-09-23 RX ORDER — FLUTICASONE FUROATE AND VILANTEROL 100; 25 UG/1; UG/1
1 POWDER RESPIRATORY (INHALATION) DAILY
Status: DISCONTINUED | OUTPATIENT
Start: 2017-09-23 | End: 2017-09-24 | Stop reason: HOSPADM

## 2017-09-23 RX ORDER — OXYCODONE AND ACETAMINOPHEN 10; 325 MG/1; MG/1
1 TABLET ORAL EVERY 8 HOURS PRN
Status: DISCONTINUED | OUTPATIENT
Start: 2017-09-23 | End: 2017-09-24 | Stop reason: HOSPADM

## 2017-09-23 RX ORDER — IPRATROPIUM BROMIDE AND ALBUTEROL SULFATE 2.5; .5 MG/3ML; MG/3ML
3 SOLUTION RESPIRATORY (INHALATION)
Status: DISCONTINUED | OUTPATIENT
Start: 2017-09-23 | End: 2017-09-24 | Stop reason: HOSPADM

## 2017-09-23 RX ORDER — IPRATROPIUM BROMIDE AND ALBUTEROL SULFATE 2.5; .5 MG/3ML; MG/3ML
3 SOLUTION RESPIRATORY (INHALATION) EVERY 4 HOURS PRN
Status: DISCONTINUED | OUTPATIENT
Start: 2017-09-23 | End: 2017-09-24 | Stop reason: HOSPADM

## 2017-09-23 RX ORDER — HEPARIN SODIUM 5000 [USP'U]/ML
5000 INJECTION, SOLUTION INTRAVENOUS; SUBCUTANEOUS EVERY 8 HOURS
Status: DISCONTINUED | OUTPATIENT
Start: 2017-09-23 | End: 2017-09-24 | Stop reason: HOSPADM

## 2017-09-23 RX ORDER — MORPHINE SULFATE 15 MG/1
15 TABLET, FILM COATED, EXTENDED RELEASE ORAL 2 TIMES DAILY
Status: DISCONTINUED | OUTPATIENT
Start: 2017-09-23 | End: 2017-09-24 | Stop reason: HOSPADM

## 2017-09-23 RX ORDER — IBUPROFEN 200 MG
16 TABLET ORAL
Status: DISCONTINUED | OUTPATIENT
Start: 2017-09-23 | End: 2017-09-24 | Stop reason: HOSPADM

## 2017-09-23 RX ORDER — MOXIFLOXACIN HYDROCHLORIDE 400 MG/1
400 TABLET ORAL DAILY
Status: DISCONTINUED | OUTPATIENT
Start: 2017-09-23 | End: 2017-09-24 | Stop reason: HOSPADM

## 2017-09-23 RX ORDER — IBUPROFEN 200 MG
24 TABLET ORAL
Status: DISCONTINUED | OUTPATIENT
Start: 2017-09-23 | End: 2017-09-24 | Stop reason: HOSPADM

## 2017-09-23 RX ORDER — CETIRIZINE HYDROCHLORIDE 5 MG/1
10 TABLET ORAL DAILY
Status: DISCONTINUED | OUTPATIENT
Start: 2017-09-23 | End: 2017-09-24 | Stop reason: HOSPADM

## 2017-09-23 RX ORDER — GLUCAGON 1 MG
1 KIT INJECTION
Status: DISCONTINUED | OUTPATIENT
Start: 2017-09-23 | End: 2017-09-24 | Stop reason: HOSPADM

## 2017-09-23 RX ORDER — TRAZODONE HYDROCHLORIDE 50 MG/1
50 TABLET ORAL NIGHTLY PRN
Status: DISCONTINUED | OUTPATIENT
Start: 2017-09-23 | End: 2017-09-24 | Stop reason: HOSPADM

## 2017-09-23 RX ADMIN — IPRATROPIUM BROMIDE AND ALBUTEROL SULFATE 3 ML: .5; 3 SOLUTION RESPIRATORY (INHALATION) at 05:09

## 2017-09-23 RX ADMIN — IPRATROPIUM BROMIDE AND ALBUTEROL SULFATE 3 ML: .5; 3 SOLUTION RESPIRATORY (INHALATION) at 09:09

## 2017-09-23 RX ADMIN — PAROXETINE HYDROCHLORIDE 30 MG: 20 TABLET, FILM COATED ORAL at 09:09

## 2017-09-23 RX ADMIN — FLUTICASONE FUROATE AND VILANTEROL TRIFENATATE 1 PUFF: 100; 25 POWDER RESPIRATORY (INHALATION) at 09:09

## 2017-09-23 RX ADMIN — PREDNISONE 50 MG: 5 TABLET ORAL at 09:09

## 2017-09-23 RX ADMIN — CETIRIZINE HYDROCHLORIDE 10 MG: 5 TABLET, FILM COATED ORAL at 09:09

## 2017-09-23 RX ADMIN — ALBUTEROL SULFATE 10 MG: 2.5 SOLUTION RESPIRATORY (INHALATION) at 12:09

## 2017-09-23 RX ADMIN — HEPARIN SODIUM 5000 UNITS: 5000 INJECTION, SOLUTION INTRAVENOUS; SUBCUTANEOUS at 02:09

## 2017-09-23 RX ADMIN — MORPHINE SULFATE 15 MG: 15 TABLET, EXTENDED RELEASE ORAL at 09:09

## 2017-09-23 RX ADMIN — IPRATROPIUM BROMIDE AND ALBUTEROL SULFATE 3 ML: .5; 3 SOLUTION RESPIRATORY (INHALATION) at 01:09

## 2017-09-23 RX ADMIN — MOXIFLOXACIN HYDROCHLORIDE 400 MG: 400 TABLET, FILM COATED ORAL at 09:09

## 2017-09-23 RX ADMIN — HEPARIN SODIUM 5000 UNITS: 5000 INJECTION, SOLUTION INTRAVENOUS; SUBCUTANEOUS at 09:09

## 2017-09-23 RX ADMIN — IPRATROPIUM BROMIDE AND ALBUTEROL SULFATE 3 ML: .5; 3 SOLUTION RESPIRATORY (INHALATION) at 08:09

## 2017-09-23 NOTE — PLAN OF CARE
Problem: Patient Care Overview  Goal: Plan of Care Review  Outcome: Ongoing (interventions implemented as appropriate)  Pt on 3  Lpm NC with [id number]  %. No respiratory distress noted will continue to monitor.  Patient tolerated nebulizer treatment well.

## 2017-09-23 NOTE — ED PROVIDER NOTES
Encounter Date: 9/22/2017    SCRIBE #1 NOTE: I, Dre Burgess, am scribing for, and in the presence of,  Hood Rdz MD. I have scribed the entire note.       History     Chief Complaint   Patient presents with    Shortness of Breath     Patient presents to the ED with reports of having shortness of breath x 2 weeks. Symptoms include cough and nausea.      Time patient was seen by the provider: 8:35 PM      The patient is a 72 y.o. female with hx of: COPD that presents to the ED with a complaint of shortness of breath, cough, and chest tightness for the past 2 weeks. She reports the Sx started gradually which has been progressively worsening. She notes associated sore throat first then with rhinorrhea and cough now with associated tightness. She describes the chest tightness in the ribs only and more so with coughing, not at rest.  The patient is on Spiriva for her COPD, no supplemental oxygen, no fast acting inhalers. She has noticed subjective fever, chills, nausea, poor appetite, myalgias(mild), and fatigue. She deneis further abdominal pain, headache, diarrhea, vertigo, or palpitations.      PCP: Dr. Verduzco          Review of patient's allergies indicates:   Allergen Reactions    Aspirin      Other reaction(s): hx of ulcers    Penicillins      Other reaction(s): Hives  Other reaction(s): Rash  Other reaction(s): Rash  Other reaction(s): Hives    Tetracycline      Other reaction(s): Swelling     Past Medical History:   Diagnosis Date    Anemia     Cataract     Chronic LBP 7/26/2012    Chronic pain     CKD (chronic kidney disease)     COPD (chronic obstructive pulmonary disease)     Dehydration     Lumbar spondylosis     Melanoma     of the lip    Migraines, neuralgic     Osteoporosis     Primary osteoarthritis of both knees     s/p Rt TKA    Subdeltoid bursitis, L>R. 9/27/2012    Ulcer     Vitamin D deficiency disease      Past Surgical History:   Procedure Laterality Date    BLADDER SUSPENSION       CATARACT EXTRACTION  11/18/13    left eye    CERVICAL LAMINECTOMY      x3, fusion x1    COLONOSCOPY  2009    HYSTERECTOMY      JOINT REPLACEMENT  2001    total right knee     LUMBAR LAMINECTOMY      x 3, fusion x1     Family History   Problem Relation Age of Onset    Arthritis Mother     Stroke Mother     Hypertension Father     Cancer Father     Cataracts Father     Diabetes Maternal Aunt     Hypertension Maternal Grandfather     Heart disease Maternal Grandfather     Cataracts Sister     Amblyopia Neg Hx     Blindness Neg Hx     Macular degeneration Neg Hx     Retinal detachment Neg Hx     Strabismus Neg Hx     Glaucoma Cousin      Social History   Substance Use Topics    Smoking status: Former Smoker     Types: Cigarettes    Smokeless tobacco: Not on file    Alcohol use Yes      Comment: Rare     Review of Systems   Constitutional: Positive for fever.   HENT: Negative for sore throat.    Respiratory: Positive for cough, shortness of breath and wheezing.    Cardiovascular: Positive for chest pain (with cough).   Gastrointestinal: Positive for nausea. Negative for diarrhea and vomiting.   Genitourinary: Negative for dysuria.   Musculoskeletal: Negative for back pain.   Skin: Negative for rash.   Allergic/Immunologic: Negative for immunocompromised state.   Neurological: Negative for dizziness, weakness and headaches.   Hematological: Does not bruise/bleed easily.       Physical Exam     Initial Vitals [09/22/17 2025]   BP Pulse Resp Temp SpO2   (!) 149/64 99 (!) 23 98.1 °F (36.7 °C) (!) 93 %      MAP       92.33         Physical Exam    Nursing note and vitals reviewed.  Constitutional: She appears well-developed and well-nourished. She is not diaphoretic. No distress.   HENT:   Head: Normocephalic and atraumatic.   Mouth/Throat: Oropharynx is clear and moist.   Dry tongue, pharyngeal erythema without exudates,    Eyes: Conjunctivae are normal. Pupils are equal, round, and reactive to  light.   Neck: Normal range of motion. Neck supple.   Cardiovascular: Normal rate, regular rhythm, normal heart sounds and intact distal pulses.   Pulmonary/Chest: No respiratory distress. She has wheezes. She has rhonchi. She has no rales. She exhibits tenderness (reproducible parasternal ).   Expiratory wheezing bilaterally in all lung fields, occasional rhonchi    Abdominal: Soft. Bowel sounds are normal. She exhibits no distension. There is no tenderness. There is no rebound and no guarding.   Musculoskeletal: Normal range of motion. She exhibits no edema or tenderness.   Lymphadenopathy:     She has no cervical adenopathy.   Neurological: She is alert and oriented to person, place, and time. She has normal strength.   Skin: Skin is warm and dry. Capillary refill takes less than 2 seconds.   No wounds noted   Psychiatric: She has a normal mood and affect. Thought content normal.         ED Course   Procedures  Labs Reviewed   CBC W/ AUTO DIFFERENTIAL - Abnormal; Notable for the following:        Result Value    WBC 14.30 (*)     RBC 3.20 (*)     Hemoglobin 9.9 (*)     Hematocrit 30.3 (*)     Platelets 521 (*)     MPV 9.0 (*)     Gran # 11.0 (*)     Mono # 1.3 (*)     Gran% 78.3 (*)     Lymph% 12.2 (*)     All other components within normal limits   COMPREHENSIVE METABOLIC PANEL - Abnormal; Notable for the following:     CO2 22 (*)     Glucose 111 (*)     Creatinine 1.9 (*)     Albumin 2.8 (*)     Alkaline Phosphatase 154 (*)     eGFR if  30 (*)     eGFR if non  26 (*)     All other components within normal limits   INFLUENZA A AND B ANTIGEN     EKG Readings: (Independently Interpreted)   EKG: NSR at 87 bpm, nl axis, nl intervals, no hypertrophy, no ST-T changes as read by me. There are no significant changes in comparison to previous EKG on 5/14/2012.         X-Rays:   Independently Interpreted Readings:   Chest X-Ray: Airspace opacity in right lower lung field, no effusion,  cardiac and mediastinal border WNL, mild hyperinflation,      Medical Decision Making:   History:   Old Medical Records: I decided to obtain old medical records.  Old Records Summarized: other records.       <> Summary of Records: Chart review:  No recent ED visits. Out patient PM&R for chronic low back pain. Past medical history of COPD.   Clinical Tests:   Lab Tests: Ordered and Reviewed  Radiological Study: Ordered and Reviewed  ED Management:  This is an emergent evaluation of a 72 y.o. female who presents with shortness of breath. After my evaluation, I believe that the patient is suffering from a COPD exacerbation secondary to URI and pneumonia.  Based upon the patient's history, physical exam, evaluation in the ED, and response to medications I feel patient requires admission for further optimization. Pt was hypoxic to 86% on Ra. She will require steroids, Q4 nebs antibiotics.       11:30 PM  Re-examination: I discussed the results of her laboratory studies and chest x-ray as well as the plan of discharge and follow up with the patient. Pt is satting 91% I will doc continuous nebulizer.   1:03 AM Re-examination: Pt continues to remain 91% saturation.  1:50 AM Re-examination: Pt is 86% on Ra.  I discussed my plan to admit. She was amenable to this. Hospitalist is paged.   2:09 AM Discussed case with [facility] resident who agreed to admission.    Other:   I have discussed this case with another health care provider.              Attending Attestation:             Attending ED Notes:   I, Dr. Hood Rdz, personally performed the services described in this documentation.   All medical record entries made by the scribe were at my direction and in my presence.   I have reviewed the chart and agree that the record is accurate and complete.   Hood Rdz MD.  6:01 PM 09/23/2017            ED Course as of Sep 23 0154   Fri Sep 22, 2017   2033 BP: (!) 149/64 [NP]   2033 Temp: 98.1 °F (36.7 °C) [NP]   2033 Pulse: 99 [NP]    2033 Resp: (!) 23 [NP]   2033 SpO2: (!) 93 % [NP]   2325 Influenza B Ag, EIA: Negative [NP]   2325 Influenza A Ag, EIA: Negative [NP]      ED Course User Index  [NP] Hood Rdz MD     Clinical Impression:     1. CAP (community acquired pneumonia)    2. COPD exacerbation    3. Hypoxia          Disposition:   Disposition: Admitted  Condition: Stable                        Hood Rdz MD  09/23/17 1802

## 2017-09-23 NOTE — ED NOTES
Pt. connected to cardiac monitor and continuous pulse ox. Weaning pt. off oxygen. Oxygen turned off. Will continue to monitor.

## 2017-09-23 NOTE — H&P
\[facility]\"" Internal Medicine History and Physical - Resident Note    Admitting Team: \[facility]\"" IM Team 2  Attending Physician: Aubrie  Resident: Joe  Interns: Monster    Date of Admit: 9/22/2017    Chief Complaint     Shortness of Breath (Patient presents to the ED with reports of having shortness of breath x 2 weeks. Symptoms include cough and nausea. )   for 2 weeks    Subjective:      History of Present Illness:  Katie Quevedo is a 72 y.o. W female who  has a past medical history of Anemia; Cataract; Chronic LBP (7/26/2012); Chronic pain; CKD (chronic kidney disease); COPD (chronic obstructive pulmonary disease); Dehydration; Lumbar spondylosis; Melanoma; Migraines, neuralgic; Osteoporosis; Primary osteoarthritis of both knees; Subdeltoid bursitis, L>R. (9/27/2012); Ulcer; and Vitamin D deficiency disease.. The patient presented to [facility] on 9/22/2017 with a primary complaint of Shortness of Breath (Patient presents to the ED with reports of having shortness of breath x 2 weeks. Symptoms include cough and nausea. )      The patient was in their usual state of health until 2 weeks ago when she developed what she though was a cold. She had cough and shortness of breath. She did not say cough was productive. She did have some nasal discharge with chest tightness. The symptoms got worse over the course of the week and she decided that she had enough and came to the Ed. She is requiring 2L of NC O2 in the Ed, and is not on home O2. She reports that is on Advair, but she only takes it when she feels she needs it rather than daily. She has never been on a rescue inhaler. She is medically non-compliant. She is on an extensive pain management regimen. She has not seen her PCP in over a year.      Past Medical History:  Past Medical History:   Diagnosis Date    Anemia     Cataract     Chronic LBP 7/26/2012    Chronic pain     CKD (chronic kidney disease)     COPD (chronic obstructive pulmonary  disease)     Dehydration     Lumbar spondylosis     Melanoma     of the lip    Migraines, neuralgic     Osteoporosis     Primary osteoarthritis of both knees     s/p Rt TKA    Subdeltoid bursitis, L>R. 9/27/2012    Ulcer     Vitamin D deficiency disease        Past Surgical History:  Past Surgical History:   Procedure Laterality Date    BLADDER SUSPENSION      CATARACT EXTRACTION  11/18/13    left eye    CERVICAL LAMINECTOMY      x3, fusion x1    COLONOSCOPY  2009    HYSTERECTOMY      JOINT REPLACEMENT  2001    total right knee     LUMBAR LAMINECTOMY      x 3, fusion x1       Allergies:  Review of patient's allergies indicates:   Allergen Reactions    Aspirin      Other reaction(s): hx of ulcers    Penicillins      Other reaction(s): Hives  Other reaction(s): Rash  Other reaction(s): Rash  Other reaction(s): Hives    Tetracycline      Other reaction(s): Swelling       Home Medications:  Prior to Admission medications    Medication Sig Start Date End Date Taking? Authorizing Provider   albuterol 90 mcg/actuation inhaler Inhale 2 puffs into the lungs every 4 (four) hours as needed for Wheezing. 9/22/17   Hood Rdz MD   cetirizine (ZYRTEC) 10 MG tablet Take 1 tablet (10 mg total) by mouth once daily. 11/7/14 2/18/16  Isai De Dios Jr., MD   DIAZEPAM (VALIUM ORAL) Take 1 tablet by mouth Twice daily as needed. 6/29/12   Historical Provider, MD   fluticasone-salmeterol 250-50 mcg/dose (ADVAIR) 250-50 mcg/dose diskus inhaler Inhale 1 puff into the lungs 2 (two) times daily. 8/13/15   Kingston Verduzco MD   morphine (MS CONTIN) 15 MG 12 hr tablet Take 1 tablet (15 mg total) by mouth 2 (two) times daily. 9/7/17 10/7/17  Pushpa Mcmahon MD   moxifloxacin (AVELOX) 400 mg tablet Take 1 tablet (400 mg total) by mouth once daily. 9/22/17   Hood Rdz MD   oxycodone-acetaminophen (PERCOCET)  mg per tablet Take 1 tablet by mouth 3 (three) times daily as needed for Pain. 9/8/17 10/8/17   Pushpa Mcmahon MD   paroxetine (PAXIL) 30 MG tablet Take 30 mg by mouth 2 (two) times daily.    Historical Provider, MD   predniSONE (DELTASONE) 20 MG tablet Take 2 tablets (40 mg total) by mouth once daily. 9/22/17 9/28/17  Hood Rdz MD   tizanidine (ZANAFLEX) 4 MG tablet TAKE 1-2 TABLETS BY MOUTH THREE TIMES A DAY AS NEEDED 5/2/17   Pushpa Mcmahon MD   trazodone (DESYREL) 50 MG tablet TAKE 1-2 TABLETS BY MOUTH NIGHTLY AS NEEDED FOR INSOMNIA 6/9/17   Pushpa Mcmahon MD   tretinoin (RETIN-A) 0.1 % cream Use hs 3/4/16   Betty Méndez MD   triamcinolone acetonide 0.1% (KENALOG) 0.1 % cream Use hs prn itching 3/2/16   Betty Méndez MD   zolpidem (AMBIEN) 10 mg tablet Take 1 tablet by mouth At bedtime as needed. 6/29/12   Historical Provider, MD       Family History:  Family History   Problem Relation Age of Onset    Arthritis Mother     Stroke Mother     Hypertension Father     Cancer Father     Cataracts Father     Diabetes Maternal Aunt     Hypertension Maternal Grandfather     Heart disease Maternal Grandfather     Cataracts Sister     Amblyopia Neg Hx     Blindness Neg Hx     Macular degeneration Neg Hx     Retinal detachment Neg Hx     Strabismus Neg Hx     Glaucoma Cousin        Social History:  Social History   Substance Use Topics    Smoking status: Former Smoker     Types: Cigarettes    Smokeless tobacco: Not on file    Alcohol use Yes      Comment: Rare       Review of Systems:  Constitutional: negative for chills and fevers  Ears, nose, mouth, throat, and face: positive for nasal congestion and sore throat  Respiratory: positive for cough, dyspnea on exertion and pleurisy/chest pain  Cardiovascular: negative for chest pain, fatigue and palpitations  Gastrointestinal: positive for nausea  Genitourinary:negative for dysuria  Musculoskeletal:negative for arthralgias and myalgias  Neurological: negative for dizziness and weakness All other systems are reviewed and are  "negative.    Health Maintaince :   Primary Care Physician: Luba  Immunizations:   TDap is up to date.  Influenza is not up to date, due.  Pneumovax is up to date.  Cancer Screening:  PAP: is up to date.  Mammogram: is not up to date. Due  Colonoscopy: is up to date.      Objective:   Last 24 Hour Vital Signs:  BP  Min: 148/67  Max: 164/62  Temp  Av.1 °F (36.7 °C)  Min: 98 °F (36.7 °C)  Max: 98.1 °F (36.7 °C)  Pulse  Av.7  Min: 88  Max: 112  Resp  Av.7  Min: 18  Max: 24  SpO2  Av.3 %  Min: 86 %  Max: 98 %  Height  Av' 2" (157.5 cm)  Min: 5' 2" (157.5 cm)  Max: 5' 2" (157.5 cm)  Weight  Av.9 kg (132 lb)  Min: 59.9 kg (132 lb)  Max: 59.9 kg (132 lb)  Body mass index is 24.14 kg/m².  No intake/output data recorded.    Physical Examination:  BP (!) 164/62 (BP Location: Left arm, Patient Position: Lying)   Pulse 110   Temp 98 °F (36.7 °C) (Oral)   Resp (!) 23   Ht 5' 2" (1.575 m)   Wt 59.9 kg (132 lb)   SpO2 (!) 93%   BMI 24.14 kg/m²   General appearance: alert, appears stated age, cooperative and tremulous  Head: Normocephalic, without obvious abnormality, atraumatic  Eyes: conjunctivae/corneas clear. PERRL, EOM's intact. Fundi benign.  Throat: lips, mucosa, and tongue normal; teeth and gums normal  Lungs: clear to auscultation bilaterally  Heart: S1, S2 normal and no murmurs, gallops, rubs, tachycardia  Abdomen: soft, non-tender; bowel sounds normal; no masses,  no organomegaly  Extremities: extremities normal, atraumatic, no cyanosis or edema  Pulses: 2+ and symmetric  Neurologic: Alert and oriented X 3, normal strength and tone. Normal symmetric reflexes. Normal coordination and gait      Laboratory:  Most Recent Data:  CBC: Lab Results   Component Value Date    WBC 14.30 (H) 2017    HGB 9.9 (L) 2017    HCT 30.3 (L) 2017     (H) 2017    MCV 95 2017    RDW 12.9 2017     WBC Differential: 78.3 % N, 12.2 % L, 9 % M, 0.3 % Eo, 0.2 % Baso, 0 " additional cells seen  BMP: Lab Results   Component Value Date     09/22/2017    K 3.7 09/22/2017     09/22/2017    CO2 22 (L) 09/22/2017    BUN 22 09/22/2017    CREATININE 1.9 (H) 09/22/2017     (H) 09/22/2017    CALCIUM 9.7 09/22/2017    MG 2.2 05/14/2012    PHOS 2.7 05/14/2012     LFTs: Lab Results   Component Value Date    PROT 8.1 09/22/2017    ALBUMIN 2.8 (L) 09/22/2017    BILITOT 0.3 09/22/2017    AST 26 09/22/2017    ALKPHOS 154 (H) 09/22/2017    ALT 16 09/22/2017     Coags:   Lab Results   Component Value Date    INR 1.0 05/13/2012     FLP: Lab Results   Component Value Date    CHOL 167 09/13/2016    HDL 63 09/13/2016    LDLCALC 75.6 09/13/2016    TRIG 142 09/13/2016    CHOLHDL 37.7 09/13/2016     DM: Lab Results   Component Value Date    HGBA1C 4.7 05/14/2012    LDLCALC 75.6 09/13/2016    CREATININE 1.9 (H) 09/22/2017     Thyroid: Lab Results   Component Value Date    TSH 1.409 09/13/2016     Anemia: Lab Results   Component Value Date    IRON 69 08/13/2015    TIBC 441 08/13/2015    FERRITIN 56 08/13/2015    WPQTCNGS87 1103 (H) 08/13/2015    FOLATE 7.0 08/13/2015     Cardiac: Lab Results   Component Value Date    TROPONINI <0.006 05/14/2012    BNP 28 05/13/2012     Urinalysis: Lab Results   Component Value Date    LABURIN No significant growth 10/08/2014    COLORU yellow 10/08/2014    SPECGRAV 1.015 10/08/2014    NITRITE negative 10/08/2014    PROTEINUR trace 10/08/2014    KETONESU negative 10/08/2014    UROBILINOGEN normal 10/08/2014    BILIRUBINUR negative 10/08/2014    WBCUA >100 (H) 02/09/2011       Trended Lab Data:    Recent Labs  Lab 09/22/17 2117   WBC 14.30*   HGB 9.9*   HCT 30.3*   *   MCV 95   RDW 12.9      K 3.7      CO2 22*   BUN 22   CREATININE 1.9*   *   PROT 8.1   ALBUMIN 2.8*   BILITOT 0.3   AST 26   ALKPHOS 154*   ALT 16       Trended Cardiac Data:  No results for input(s): TROPONINI, CKTOTAL, CKMB, BNP in the last 168 hours.    Microbiology  Data:  None    Other Laboratory Data:  None    Other Results:  EKG (my interpretation): Sinus tachycardia.    Radiology:  Imaging Results          X-Ray Chest PA And Lateral (Final result)  Result time 09/22/17 22:40:14    Final result by Андрей Solitario MD (09/22/17 22:40:14)                 Impression:       Airspace opacity in the right lung base, concerning for pneumonia.              Electronically signed by: АНДРЕЙ SOLITARIO MD  Date:     09/22/17  Time:    22:40              Narrative:    Exam: [id number]  09/22/17  22:16:17 IMG36 ([facility]) : XR CHEST PA AND LATERAL    Technique:    Frontal and lateral chest x-ray.    Comparison:    05/13/2012    Findings:      There are postoperative changes in the lower cervical spine.  Monitoring EKG leads are present.    The trachea is unremarkable.  The cardiomediastinal silhouette is within normal limits.  The hemidiaphragms are unremarkable.  There are no pleural effusions.  There is no evidence of a pneumothorax.  There is no evidence of pneumomediastinum.  There is an airspace opacity in the right lung base.  There are degenerative changes in the osseous structures.                                 Assessment:     Katie Quevedo is a 72 y.o. female with:  Patient Active Problem List    Diagnosis Date Noted    CAP (community acquired pneumonia) 09/23/2017    Pruritus 02/20/2016    Chronic low back pain 09/25/2015    Osteoporosis 09/21/2015    Osteoarthritis, hip, bilateral 10/27/2014    Lumbar radiculopathy, BLE 10/27/2014    Cervical radiculopathy, BUE 10/27/2014    COPD (chronic obstructive pulmonary disease) with chronic bronchitis 10/08/2014    UTI symptoms 10/08/2014    CKD (chronic kidney disease)     Biceps tendonitis 01/06/2014    Muscle spasm 12/18/2013    Intractable migraine 12/18/2013    Post-operative state 11/18/2013    Rotator cuff tear, right 10/18/2013    Nuclear sclerosis - Both Eyes 08/08/2013    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 WBC 14.3  - Lungs sound clear, no wheezing  - Requiring O2  - Started on Moxifloxacin  - Getting Q4 Duonebs and 40mg Prednisone daily  - Needs to be complaint with Advair on discharge and needs a rescue inhaler    CKD 3  - Cr 1.9 on admission, this is her baseline  - Avoiding nephrotoxins    Normocytic Anemia  - H/H 9.9/30.3 on admission, slightly lower than the last values recorded  - Iron studies, can get as an outpatient by PCP    Thrombocytosis  - Platelets 521, possibly reactive  - Will monitor    Chronic Pain  - On extensive pain management regimen  - Continuing as prescribed    Anxiety  - Home Paxil, trazadone    HCM  - PCP Luba  - Needs flu vac  - Needs mammo  - UTD on all other shots and screenings    F None  E As needed  N Regular    DVT PPX Heparin    Dispo Pending symptomatic improvement      Code Status:     Not on file    Seven More  [facility] Internal Medicine HO-1  U IM Service    [facility] Medicine Hospitalist Pager numbers:   [facility] Hospitalist Medicine Team A (Mitzy/Benjamin): [phone]  [facility] Hospitalist Medicine Team B (Aubrie/Judit):  [phone]

## 2017-09-23 NOTE — ED NOTES
Pt. O2 sat 88% on RA. Denies use of home O2. Pt. Reports SOB, increased respiration rate. Denies CP. Placed on 2L NC for comfort measures. Oxygen sat increased to 92% with assistance from O2.. Breath sounds diminished bilaterally with coarse crackles to RML/RLL. NAD. Pt. reports temporary relief from neb treatment.

## 2017-09-23 NOTE — PLAN OF CARE
Problem: Patient Care Overview  Goal: Plan of Care Review  Outcome: Ongoing (interventions implemented as appropriate)  Pt AAOx4. Pt remains free from pain. Encouraged pt to call for assistance. Bed alarm activated. No distress noted. Will continue to monitor.    Problem: Pneumonia (Adult)  Goal: Signs and Symptoms of Listed Potential Problems Will be Absent, Minimized or Managed (Pneumonia)  Signs and symptoms of listed potential problems will be absent, minimized or managed by discharge/transition of care (reference Pneumonia (Adult) CPG).  Outcome: Ongoing (interventions implemented as appropriate)  Pt AAOx4. Respirations even, unlabored on 3L N/C sats @ 97-98%. Pt has dyspnea on exertion. Yet denies shortness of breath while lying in bed.

## 2017-09-23 NOTE — ED NOTES
Pt awake alert in no acute distress, pt reports sob with chest pain intermittent for the past 2 weeks, reports 101 fever, family at bedside, denies n/v/d, pt denies abd pain

## 2017-09-23 NOTE — PLAN OF CARE
Problem: Patient Care Overview  Goal: Plan of Care Review  Outcome: Ongoing (interventions implemented as appropriate)  Pt must leave O2 on at all times.Desats and gets extremely SOB without it , alexander while walking/using bathroom.

## 2017-09-23 NOTE — ED NOTES
ROAD tested pt. around nurses station. O2 sat 86% on RA. . Respirations tachypnic. Mild distress.

## 2017-09-23 NOTE — PLAN OF CARE
Chief Complaint      Shortness of Breath (Patient presents to the ED with reports of having shortness of breath x 2 weeks. Symptoms include cough and nausea. )   for 2 weeks     Subjective:      History of Present Illness:  Katie Quevedo is a 72 y.o. W female who  has a past medical history of Anemia; Cataract; Chronic LBP (7/26/2012); Chronic pain; CKD (chronic kidney disease); COPD (chronic obstructive pulmonary disease); Dehydration; Lumbar spondylosis; Melanoma; Migraines, neuralgic; Osteoporosis; Primary osteoarthritis of both knees; Subdeltoid bursitis, L>R. (9/27/2012); Ulcer; and Vitamin D deficiency disease.. The patient presented to [facility] on 9/22/2017 with a primary complaint of Shortness of Breath (Patient presents to the ED with reports of having shortness of breath x 2 weeks. Symptoms include cough and nausea. )     Pt independent with ADLs, No HH, has RW, Std, Walker, Rolator, WC, Straight  Cane, Quad Cane  Lives with  and has supportive family who will provide transportation on discharge       09/23/17 8602   Discharge Assessment   Assessment Type Discharge Planning Assessment   Confirmed/corrected address and phone number on facesheet? Yes   Assessment information obtained from? Patient;Caregiver  (pt and )   Expected Length of Stay (days) 2   Communicated expected length of stay with patient/caregiver yes   Prior to hospitilization cognitive status: Alert/Oriented   Prior to hospitalization functional status: Independent   Current cognitive status: Alert/Oriented   Current Functional Status: Independent   Lives With spouse   Able to Return to Prior Arrangements yes   Is patient able to care for self after discharge? Yes   Who are your caregiver(s) and their phone number(s)? : Dre Quevedo  [phone]   Patient's perception of discharge disposition home or selfcare   Readmission Within The Last 30 Days no previous admission in last 30 days   Patient  currently being followed by outpatient case management? No   Patient currently receives any other outside agency services? No   Equipment Currently Used at Home wheelchair;walker, standard;walker, rolling;rollator;cane, quad;cane, straight   Do you have any problems affording any of your prescribed medications? No   Is the patient taking medications as prescribed? yes   Does the patient have transportation home? Yes   Transportation Available family or friend will provide   Dialysis Name and Scheduled days N/A   Does the patient receive services at the [facility]? No   Discharge Plan A Home   Discharge Plan B Home with family   Patient/Family In Agreement With Plan yes     Brisa Leon RN Transitional Navigator  [phone]

## 2017-09-24 VITALS
OXYGEN SATURATION: 92 % | WEIGHT: 139.56 LBS | SYSTOLIC BLOOD PRESSURE: 121 MMHG | HEART RATE: 84 BPM | DIASTOLIC BLOOD PRESSURE: 63 MMHG | RESPIRATION RATE: 18 BRPM | TEMPERATURE: 99 F | BODY MASS INDEX: 25.68 KG/M2 | HEIGHT: 62 IN

## 2017-09-24 LAB
ALBUMIN SERPL BCP-MCNC: 2.4 G/DL
ALP SERPL-CCNC: 121 U/L
ALT SERPL W/O P-5'-P-CCNC: 16 U/L
ANION GAP SERPL CALC-SCNC: 10 MMOL/L
ANISOCYTOSIS BLD QL SMEAR: SLIGHT
AST SERPL-CCNC: 24 U/L
BASOPHILS # BLD AUTO: ABNORMAL K/UL
BASOPHILS NFR BLD: 0 %
BILIRUB SERPL-MCNC: 0.2 MG/DL
BUN SERPL-MCNC: 29 MG/DL
CALCIUM SERPL-MCNC: 9.2 MG/DL
CHLORIDE SERPL-SCNC: 107 MMOL/L
CO2 SERPL-SCNC: 22 MMOL/L
CREAT SERPL-MCNC: 1.9 MG/DL
DIFFERENTIAL METHOD: ABNORMAL
EOSINOPHIL # BLD AUTO: ABNORMAL K/UL
EOSINOPHIL NFR BLD: 0 %
ERYTHROCYTE [DISTWIDTH] IN BLOOD BY AUTOMATED COUNT: 13.5 %
EST. GFR  (AFRICAN AMERICAN): 30 ML/MIN/1.73 M^2
EST. GFR  (NON AFRICAN AMERICAN): 26 ML/MIN/1.73 M^2
GLUCOSE SERPL-MCNC: 136 MG/DL
HCT VFR BLD AUTO: 26.6 %
HGB BLD-MCNC: 8.6 G/DL
HYPOCHROMIA BLD QL SMEAR: ABNORMAL
LYMPHOCYTES # BLD AUTO: ABNORMAL K/UL
LYMPHOCYTES NFR BLD: 8 %
MCH RBC QN AUTO: 30.4 PG
MCHC RBC AUTO-ENTMCNC: 32.3 G/DL
MCV RBC AUTO: 94 FL
MONOCYTES # BLD AUTO: ABNORMAL K/UL
MONOCYTES NFR BLD: 6 %
NEUTROPHILS NFR BLD: 80 %
NEUTS BAND NFR BLD MANUAL: 6 %
PLATELET # BLD AUTO: 443 K/UL
PLATELET BLD QL SMEAR: ABNORMAL
PMV BLD AUTO: 8.5 FL
POTASSIUM SERPL-SCNC: 4.2 MMOL/L
PROT SERPL-MCNC: 6.9 G/DL
RBC # BLD AUTO: 2.83 M/UL
SODIUM SERPL-SCNC: 139 MMOL/L
WBC # BLD AUTO: 18.57 K/UL

## 2017-09-24 PROCEDURE — 94761 N-INVAS EAR/PLS OXIMETRY MLT: CPT

## 2017-09-24 PROCEDURE — 85027 COMPLETE CBC AUTOMATED: CPT

## 2017-09-24 PROCEDURE — 27000221 HC OXYGEN, UP TO 24 HOURS

## 2017-09-24 PROCEDURE — 36415 COLL VENOUS BLD VENIPUNCTURE: CPT

## 2017-09-24 PROCEDURE — 85007 BL SMEAR W/DIFF WBC COUNT: CPT

## 2017-09-24 PROCEDURE — 80053 COMPREHEN METABOLIC PANEL: CPT

## 2017-09-24 PROCEDURE — 25000242 PHARM REV CODE 250 ALT 637 W/ HCPCS: Performed by: HOSPITALIST

## 2017-09-24 PROCEDURE — 94640 AIRWAY INHALATION TREATMENT: CPT

## 2017-09-24 PROCEDURE — 25000003 PHARM REV CODE 250: Performed by: HOSPITALIST

## 2017-09-24 PROCEDURE — 63600175 PHARM REV CODE 636 W HCPCS: Performed by: HOSPITALIST

## 2017-09-24 RX ORDER — PREDNISONE 10 MG/1
50 TABLET ORAL DAILY
Qty: 15 TABLET | Refills: 0 | Status: SHIPPED | OUTPATIENT
Start: 2017-09-25 | End: 2017-09-28

## 2017-09-24 RX ORDER — MOXIFLOXACIN HYDROCHLORIDE 400 MG/1
400 TABLET ORAL DAILY
Qty: 5 TABLET | Refills: 0 | Status: SHIPPED | OUTPATIENT
Start: 2017-09-25 | End: 2017-10-18

## 2017-09-24 RX ORDER — FLUTICASONE PROPIONATE AND SALMETEROL 250; 50 UG/1; UG/1
1 POWDER RESPIRATORY (INHALATION) 2 TIMES DAILY
Qty: 60 EACH | Refills: 3 | Status: SHIPPED | OUTPATIENT
Start: 2017-09-24 | End: 2018-04-25 | Stop reason: SDUPTHER

## 2017-09-24 RX ADMIN — IPRATROPIUM BROMIDE AND ALBUTEROL SULFATE 3 ML: .5; 3 SOLUTION RESPIRATORY (INHALATION) at 04:09

## 2017-09-24 RX ADMIN — PAROXETINE HYDROCHLORIDE 30 MG: 20 TABLET, FILM COATED ORAL at 09:09

## 2017-09-24 RX ADMIN — HEPARIN SODIUM 5000 UNITS: 5000 INJECTION, SOLUTION INTRAVENOUS; SUBCUTANEOUS at 01:09

## 2017-09-24 RX ADMIN — HEPARIN SODIUM 5000 UNITS: 5000 INJECTION, SOLUTION INTRAVENOUS; SUBCUTANEOUS at 05:09

## 2017-09-24 RX ADMIN — PREDNISONE 50 MG: 5 TABLET ORAL at 09:09

## 2017-09-24 RX ADMIN — MOXIFLOXACIN HYDROCHLORIDE 400 MG: 400 TABLET, FILM COATED ORAL at 09:09

## 2017-09-24 RX ADMIN — FLUTICASONE FUROATE AND VILANTEROL TRIFENATATE 1 PUFF: 100; 25 POWDER RESPIRATORY (INHALATION) at 09:09

## 2017-09-24 RX ADMIN — MORPHINE SULFATE 15 MG: 15 TABLET, EXTENDED RELEASE ORAL at 09:09

## 2017-09-24 RX ADMIN — IPRATROPIUM BROMIDE AND ALBUTEROL SULFATE 3 ML: .5; 3 SOLUTION RESPIRATORY (INHALATION) at 11:09

## 2017-09-24 RX ADMIN — IPRATROPIUM BROMIDE AND ALBUTEROL SULFATE 3 ML: .5; 3 SOLUTION RESPIRATORY (INHALATION) at 08:09

## 2017-09-24 RX ADMIN — CETIRIZINE HYDROCHLORIDE 10 MG: 5 TABLET, FILM COATED ORAL at 09:09

## 2017-09-24 NOTE — PROGRESS NOTES
[facility] IM Resident HO-1 Progress Note    Subjective:      Katie Quevedo is a 72 y.o.  female who is being followed by the [facility] IM service at [facility] for shortness of breath.      Ms. Quevedo was asleep this morning. Easily arousable to voice. A&Ox3 in no distress. No complaints this morning. Denies shortness of breath.    Objective:   Last 24 Hour Vital Signs:  BP  Min: 109/57  Max: 133/62  Temp  Av.8 °F (36.6 °C)  Min: 96.5 °F (35.8 °C)  Max: 98.7 °F (37.1 °C)  Pulse  Av.3  Min: 69  Max: 106  Resp  Av.2  Min: 18  Max: 20  SpO2  Av.3 %  Min: 93 %  Max: 99 %  Weight  Av.3 kg (139 lb 8.8 oz)  Min: 63.3 kg (139 lb 8.8 oz)  Max: 63.3 kg (139 lb 8.8 oz)  I/O last 3 completed shifts:  In: -   Out: 700 [Urine:700]    Physical Examination:  General appearance: alert, appears stated age, cooperative  Head: Normocephalic, without obvious abnormality, atraumatic  Eyes: conjunctivae/corneas clear. PERRL, EOM's intact. Fundi benign.  Throat: lips, mucosa, and tongue normal; teeth and gums normal  Lungs: clear to auscultation bilaterally  Heart: S1, S2 normal and no murmurs, gallops, rubs, tachycardia  Abdomen: soft, non-tender; bowel sounds normal; no masses,  no organomegaly  Extremities: extremities normal, atraumatic, no cyanosis or edema  Pulses: 2+ and symmetric  Neurologic: Alert and oriented X 3, normal strength and tone. Normal symmetric reflexes. Normal coordination and gait      Laboratory:  Laboratory Data Reviewed: yes  Pertinent Findings:  Most Recent Data:  CBC: Lab Results   Component Value Date    WBC 18.57 (H) 2017    HGB 8.6 (L) 2017    HCT 26.6 (L) 2017     (H) 2017    MCV 94 2017    RDW 13.5 2017     BMP: Lab Results   Component Value Date     2017    K 4.2 2017     2017    CO2 22 (L) 2017    BUN 29 (H) 2017    CREATININE 1.9 (H) 2017     (H) 2017    CALCIUM 9.2  09/24/2017    MG 2.2 05/14/2012    PHOS 2.7 05/14/2012     LFTs: Lab Results   Component Value Date    PROT 6.9 09/24/2017    ALBUMIN 2.4 (L) 09/24/2017    BILITOT 0.2 09/24/2017    AST 24 09/24/2017    ALKPHOS 121 09/24/2017    ALT 16 09/24/2017     Coags:   Lab Results   Component Value Date    INR 1.0 05/13/2012     FLP: Lab Results   Component Value Date    CHOL 167 09/13/2016    HDL 63 09/13/2016    LDLCALC 75.6 09/13/2016    TRIG 142 09/13/2016    CHOLHDL 37.7 09/13/2016     DM: Lab Results   Component Value Date    HGBA1C 4.7 05/14/2012    LDLCALC 75.6 09/13/2016    CREATININE 1.9 (H) 09/24/2017     Thyroid: Lab Results   Component Value Date    TSH 1.409 09/13/2016     Anemia: Lab Results   Component Value Date    IRON 18 (L) 09/23/2017    TIBC 275 09/23/2017    FERRITIN 209 09/23/2017    VIELUACW59 >2000 (H) 09/23/2017    FOLATE 15.4 09/23/2017     Cardiac: Lab Results   Component Value Date    TROPONINI <0.006 05/14/2012    BNP 28 05/13/2012     Urinalysis: Lab Results   Component Value Date    LABURIN No significant growth 10/08/2014    COLORU yellow 10/08/2014    SPECGRAV 1.015 10/08/2014    NITRITE negative 10/08/2014    PROTEINUR trace 10/08/2014    KETONESU negative 10/08/2014    UROBILINOGEN normal 10/08/2014    BILIRUBINUR negative 10/08/2014    WBCUA >100 (H) 02/09/2011       Trended Lab Data:    Recent Labs  Lab 09/22/17  2117 09/23/17  0707 09/24/17  0358   WBC 14.30* 11.10 18.57*   HGB 9.9* 8.5* 8.6*   HCT 30.3* 26.8* 26.6*   * 423* 443*   MCV 95 95 94   RDW 12.9 13.2 13.5    139 139   K 3.7 4.6 4.2    106 107   CO2 22* 22* 22*   BUN 22 23 29*   CREATININE 1.9* 1.8* 1.9*   * 175* 136*   PROT 8.1 6.9 6.9   ALBUMIN 2.8* 2.3* 2.4*   BILITOT 0.3 0.2 0.2   AST 26 18 24   ALKPHOS 154* 126 121   ALT 16 13 16       Microbiology Data Reviewed: yes  Pertinent Findings:  None    Other Results:  EKG (my interpretation): sinus tachycardia    Radiology Data Reviewed: yes  Pertinent  Findings:  X-Ray Chest PA And Lateral [[id number]] Resulted: 09/22/17 2240   Order Status: Completed Updated: 09/22/17 2240   Narrative:     Exam: [id number] 09/22/17  22:16:17 IMG36 ([facility]) : XR CHEST PA AND LATERAL    Technique:    Frontal and lateral chest x-ray.    Comparison:    05/13/2012    Findings:      There are postoperative changes in the lower cervical spine.  Monitoring EKG leads are present.    The trachea is unremarkable.  The cardiomediastinal silhouette is within normal limits.  The hemidiaphragms are unremarkable.  There are no pleural effusions.  There is no evidence of a pneumothorax.  There is no evidence of pneumomediastinum.  There is an airspace opacity in the right lung base.  There are degenerative changes in the osseous structures.   Impression:        Airspace opacity in the right lung base, concerning for pneumonia.         Current Medications:     Infusions:        Scheduled:   albuterol-ipratropium 2.5mg-0.5mg/3mL  3 mL Nebulization Q4H WAKE    cetirizine  10 mg Oral Daily    fluticasone-vilanterol  1 puff Inhalation Daily    heparin (porcine)  5,000 Units Subcutaneous Q8H    morphine  15 mg Oral BID    moxifloxacin  400 mg Oral Daily    paroxetine  30 mg Oral Daily    predniSONE  50 mg Oral Daily        PRN:  albuterol-ipratropium 2.5mg-0.5mg/3mL, dextrose 50%, dextrose 50%, glucagon (human recombinant), glucose, glucose, influenza, oxycodone-acetaminophen, trazodone    Antibiotics and Day Number of Therapy:  moxifloxacin day 2    Lines and Day Number of Therapy:  PIV    Assessment:     Katie Quevedo is a 72 y.o.female with  Patient Active Problem List    Diagnosis Date Noted    CAP (community acquired pneumonia) 09/23/2017    COPD exacerbation     Hypoxia     Pruritus 02/20/2016    Chronic low back pain 09/25/2015    Osteoporosis 09/21/2015    Osteoarthritis, hip, bilateral 10/27/2014    Lumbar radiculopathy, BLE 10/27/2014    Cervical radiculopathy, BUE 10/27/2014     COPD (chronic obstructive pulmonary disease) with chronic bronchitis 10/08/2014    UTI symptoms 10/08/2014    CKD (chronic kidney disease)     Biceps tendonitis 01/06/2014    Muscle spasm 12/18/2013    Intractable migraine 12/18/2013    Post-operative state 11/18/2013    Rotator cuff tear, right 10/18/2013    Nuclear sclerosis - Both Eyes 08/08/2013    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 WBC 14.3  - Lungs sound clear, no wheezing  - Requiring O2  - Started on Moxifloxacin  - Getting Q4 Duonebs and 50mg Prednisone daily  - Needs to be compliant with Advair on discharge and needs a rescue inhaler  - afebrile overnight, WBC 18.57 (likely 2/2 to demargination from steroids)    CKD 3  - Cr 1.9 on admission, this is her baseline  - Avoiding nephrotoxins  - Cr. 1.9 this AM     Normocytic Anemia  - H/H 9.9/30.3 on admission, slightly lower than the last values recorded  - Iron 18, TIBC 275, transferrin 186, ferritin 209, folate 15.4, vitb12 >2000  - H/H 8.6/26.6     Thrombocytosis  - Platelets 521, possibly reactive  - Will monitor     Chronic Pain  - On extensive pain management regimen  - Continuing as prescribed     Anxiety  - Home Paxil, trazadone     HCM  - PCP Luba  - Needs flu vac  - Needs mammo  - UTD on all other shots and screenings     Diet Regular  DVT PPX Heparin  Dispo Pending  symptomatic improvement    Nicolas Berrios  [facility] Internal Medicine HO-1  [facility] IM Service Team B    [facility] Medicine Hospitalist Pager numbers:   [facility] Hospitalist Medicine Team A (Mitzy/Benjamin): [phone]  [facility] Hospitalist Medicine Team B (Aubrie/Judit):  [phone]

## 2017-09-24 NOTE — PLAN OF CARE
Problem: Patient Care Overview  Goal: Plan of Care Review  Pt received on 2 lpm NC with SpO2 94 %. Treatments given with no adverse reactions. No respiratory distress noted. Will continue to monitor.

## 2017-09-24 NOTE — PLAN OF CARE
Problem: Patient Care Overview  Goal: Plan of Care Review  Outcome: Ongoing (interventions implemented as appropriate)  Pt AAOx4, no family members at bedside. Pt complains of BERTRAND but denies pain and n/v/d. Pt on oxygen therapy via nasal cannula. Regular diet tolerated well. Cardiac monitoring continued. Safety maintained, bed alarm on - will cont to monitor.

## 2017-09-24 NOTE — PLAN OF CARE
Problem: Patient Care Overview  Goal: Plan of Care Review  Outcome: Ongoing (interventions implemented as appropriate)  Received pt on 3L NC; no distress noted. Tolerated tx well; will cont to monitor

## 2017-09-26 ENCOUNTER — PATIENT OUTREACH (OUTPATIENT)
Dept: ADMINISTRATIVE | Facility: CLINIC | Age: 72
End: 2017-09-26

## 2017-09-26 DIAGNOSIS — R06.02 SOB (SHORTNESS OF BREATH): Primary | ICD-10-CM

## 2017-09-26 NOTE — DISCHARGE SUMMARY
[facility] IM Discharge Summary    Primary Team: [facility] Internal Medicine Team B  Attending Physician: Aubrie  Resident: Jaguar  Intern: Yudelka/Agustina    Date of Admit: 9/22/2017  Date of Discharge: 9/24/2017    Discharge to: Home  Condition: Stable    Discharge Diagnoses     Patient Active Problem List   Diagnosis    Melanoma    Chronic pain    Lumbar spondylosis    Vitamin deficiency    Cervical post-laminectomy syndrome    Lumbar postlaminectomy syndrome    Chronic neck pain    Lumbosacral spondylosis without myelopathy    Isolated cervical dystonia    Primary osteoarthritis of both knees    Subdeltoid bursitis, L>R.    Other fragments of torsion dystonia    Nuclear sclerosis - Both Eyes    Rotator cuff tear, right    Post-operative state    Muscle spasm    Intractable migraine    Biceps tendonitis    CKD (chronic kidney disease)    COPD (chronic obstructive pulmonary disease) with chronic bronchitis    UTI symptoms    Osteoarthritis, hip, bilateral    Lumbar radiculopathy, BLE    Cervical radiculopathy, BUE    Osteoporosis    Chronic low back pain    Pruritus    CAP (community acquired pneumonia)    COPD exacerbation    Hypoxia       Consultants and Procedures     Consultants:  None    Procedures:   None    Brief History of Present Illness     Ms Quevedo was in her usual state of health until 2 weeks prior to admit when she developed what she though was a cold. She had cough and shortness of breath. She did not say cough was productive. She did have some nasal discharge with chest tightness. The symptoms worsened over the course of the week and she decided that she had ha enough and came to the ED. She required 2L of O2 via NC in the ED. She is not on home O2. She reported that she is on Advair, but she only takes it when she feels she needs it rather than daily. She has never been on a rescue inhaler. She is medically non-compliant. She is on an extensive pain management regimen. She  has not seen her PCP in over a year.       For the full HPI please refer to the History & Physical from this admission.    Hospital Course By Problem with Pertinent Findings     Community Acquired Pneumonia vs COPD exacerbation  - Shortness of breath over last 2 weeks, known history of COPD, non-compliant with medications  - Chest XR showed minimal RLL infiltrate, possible pneumonia. WBC 14.3  - Lungs were clear, no wheezing  - Required O2  - Started on Moxifloxacin  - Received q4h Duonebs and 50mg Prednisone daily  - Needs to be more compliant with Advair on discharge  -discharged with combivent rescue inhaler and short course of oral prednisone and moxifloxacin (see discharge medications below)     CKD 3  -Cr 1.9 on admission, this is her baseline  -Cr 1.9 on discharge     Normocytic Anemia  - H/H 9.9/30.3 on admission, slightly lower than baseline  - Iron 18, TIBC 275, transferrin 186, ferritin 209, folate 15.4, vitb12 >2000  - H/H 8.6/26.6 at discharge. To have close follow-up with PCP     Thrombocytosis  - Platelets 521, likely reactive     Chronic Pain  - On extensive pain management regimen  - Continued as prescribed     Anxiety  - continued home paxil, trazadone     DVT PPX: Heparin  Code: Full    Discharge Medications        Medication List      START taking these medications    ipratropium-albuterol  mcg/actuation inhaler  Commonly known as:  COMBIVENT  Inhale 1 puff into the lungs every 4 (four) hours as needed for Wheezing. Rescue     moxifloxacin 400 mg tablet  Commonly known as:  AVELOX  Take 1 tablet (400 mg total) by mouth once daily.     predniSONE 10 MG tablet  Commonly known as:  DELTASONE  Take 5 tablets (50 mg total) by mouth once daily.        CONTINUE taking these medications    cetirizine 10 MG tablet  Commonly known as:  ZYRTEC  Take 1 tablet (10 mg total) by mouth once daily.     fluticasone-salmeterol 250-50 mcg/dose 250-50 mcg/dose diskus inhaler  Commonly known as:  ADVAIR  Inhale  1 puff into the lungs 2 (two) times daily.     morphine 15 MG 12 hr tablet  Commonly known as:  MS CONTIN  Take 1 tablet (15 mg total) by mouth 2 (two) times daily.     oxycodone-acetaminophen  mg per tablet  Commonly known as:  PERCOCET  Take 1 tablet by mouth 3 (three) times daily as needed for Pain.     paroxetine 30 MG tablet  Commonly known as:  PAXIL     tizanidine 4 MG tablet  Commonly known as:  ZANAFLEX  TAKE 1-2 TABLETS BY MOUTH THREE TIMES A DAY AS NEEDED     trazodone 50 MG tablet  Commonly known as:  DESYREL  TAKE 1-2 TABLETS BY MOUTH NIGHTLY AS NEEDED FOR INSOMNIA     tretinoin 0.1 % cream  Commonly known as:  RETIN-A  Use hs     triamcinolone acetonide 0.1% 0.1 % cream  Commonly known as:  KENALOG  Use hs prn itching     VALIUM ORAL        STOP taking these medications    AMBIEN 10 mg Tab  Generic drug:  zolpidem           Where to Get Your Medications      You can get these medications from any pharmacy    Bring a paper prescription for each of these medications  · fluticasone-salmeterol 250-50 mcg/dose 250-50 mcg/dose diskus inhaler  · ipratropium-albuterol  mcg/actuation inhaler  · moxifloxacin 400 mg tablet  · predniSONE 10 MG tablet         Discharge Information:   Diet:  Regular    Physical Activity:  As tolerated.    Instructions:  1. Take all medications as prescribed  2. Keep all follow-up appointments  3. Return to the hospital or call your primary care physicians if any worsening symptoms such as chest pain, shortness of breath, nausea/vomiting, fever/chills, loss of consciousness occur.    Follow-Up Appointments:  Future Appointments  Date Time Provider Department Center   12/5/2017 2:00 PM Pushpa Mcmahon MD [facility]   Ambulatory referral placed to pulmonologist Lissette Donato.    Nicolas Berrios  \[facility] [place] [facility]\"" Internal Medicine, HO-1

## 2017-09-26 NOTE — PATIENT INSTRUCTIONS
Treating Pneumonia  Pneumonia is an infection of one or both of the lungs. Pneumonia:  · Is usually caused by either a virus or a bacteria  · Can be very serious, especially in infants, young children, and older adults. Its also serious for those with other long-term health problems or weakened immune systems.  · Is sometimes treated at home and sometimes in the hospital    Antibiotic medicines  Antibiotics may be prescribed for pneumonia caused by bacteria. They may be pills (oral medicines), or shots (injections). Or they may be given by IV (intravenously) into a vein. If you are taking oral medicines at home:  · Fill your prescription and start taking your medicine as soon as you can.  · You will likely start to feel better in a day or 2, but dont stop taking the antibiotic.  · Use a pill organizer to help you remember to take your medicine.  · Let your healthcare provider know if you have side effects.  · Take your medicine exactly as directed on the label. Talk to your provider or pharmacist if you have any questions.  Antiviral medicines  Antiviral medicine may be prescribed for pneumonia caused by a virus. For example, antiviral medicine may be prescribed for pneumonia caused by the flu virus. Antibiotics do not work against viruses. If you are taking antiviral medicine at home:  · Fill your prescription and start taking your medicine as soon as you can.  · Talk with your provider or pharmacist about possible side effects.  · Take the medicine exactly as instructed.  To relieve symptoms  There are many medicines that can help relieve symptoms of pneumonia. Some are prescription and some are over-the-counter.  Your healthcare provider may recommend:  · Acetaminophen or ibuprofen to lower your fever and to lessen headache or other pain  · Cough medicine to loosen mucus or to reduce coughing  Make sure you check with your healthcare provider or pharmacist before taking any over-the-counter  medicines.  Special treatments  If you are hospitalized for pneumonia, you may have other therapies, including:  · Inhaled medicines to help with breathing or chest congestion  · Supplemental oxygen to increase low oxygen levels  Drink fluids and eat healthy  You should eat healthy to help your body fight the infection. Drinking a lot of fluids helps to replace fluids lost from fever and to loosen mucus in your chest.  · Diet. Make healthy food choices, including fruits and vegetables, lean meats and other proteins, 100% whole grain and low- or no-fat dairy products.  · Fluids. Drink at least 6 to 8 tall glasses a day. Water and 100% fruit or vegetable juice are best.  Get plenty of rest and sleep  You may be more tired than usual for a while. It is important to get enough sleep at night. Its also important to rest during the day. Talk with your healthcare provider if coughing or other symptoms are interfering with your sleep.  Preventing the spread of germs  The best thing you can do to prevent spreading germs is to wash your hands often. You should:  · Rub your hand with soap and water for 20 to 30 seconds.  · Clean in between your fingers, the backs of your hands, and around your nails.  · Dry your hands on a separate towel or use paper towels.  You should also:  · Keep alcohol-based hand  nearby.  · Make sure you also clean surfaces that you touch. Use a product that kills all types of germs.  · Stay away from others until you are feeling better.  When to call your healthcare provider  Call your healthcare provider if you have any of the following:  · Symptoms get worse  · Fever continues  · Shortness of breath gets worse  · Increased mucus or mucus that is darker in color  · Coughing gets worse  · Lips or fingers are bluish in color  · Side effects from your medicine   Date Last Reviewed: 12/1/2016  © 1878-8050 [employer]. [address] [place] [address], [place] [address]. All rights reserved.  This information is not intended as a substitute for professional medical care. Always follow your healthcare professional's instructions.

## 2017-09-27 ENCOUNTER — TELEPHONE (OUTPATIENT)
Dept: PHARMACY | Facility: CLINIC | Age: 72
End: 2017-09-27

## 2017-09-27 NOTE — TELEPHONE ENCOUNTER
Patient Referred By meghan MORRIS For assistance with Combivent Inhaler. Was able to get patient approved for $1400 vivian thru [facility] Enrollment effective today Enrollment ends 09/26/2018.  RX BIN:[id number]  ID:[id number]  RXGRP:[id number]

## 2017-09-27 NOTE — TELEPHONE ENCOUNTER
Was able to get patient approved for $1400 vivian thru [facility] Enrollment effective today Enrollment ends 09/26/2018.  RX BIN:[id number]  ID:[id number]  RXGRP:[id number]

## 2017-10-13 DIAGNOSIS — M17.0 PRIMARY OSTEOARTHRITIS OF BOTH KNEES: ICD-10-CM

## 2017-10-13 DIAGNOSIS — G89.29 CHRONIC MIDLINE LOW BACK PAIN WITH RIGHT-SIDED SCIATICA: ICD-10-CM

## 2017-10-13 DIAGNOSIS — G89.29 CHRONIC NECK PAIN: ICD-10-CM

## 2017-10-13 DIAGNOSIS — M16.0 PRIMARY OSTEOARTHRITIS OF BOTH HIPS: ICD-10-CM

## 2017-10-13 DIAGNOSIS — M54.41 CHRONIC MIDLINE LOW BACK PAIN WITH RIGHT-SIDED SCIATICA: ICD-10-CM

## 2017-10-13 DIAGNOSIS — M54.2 CHRONIC NECK PAIN: ICD-10-CM

## 2017-10-13 RX ORDER — OXYCODONE AND ACETAMINOPHEN 10; 325 MG/1; MG/1
TABLET ORAL
Qty: 90 TABLET | Refills: 0 | Status: SHIPPED | OUTPATIENT
Start: 2017-10-14 | End: 2017-11-15 | Stop reason: SDUPTHER

## 2017-10-18 ENCOUNTER — HOSPITAL ENCOUNTER (OUTPATIENT)
Facility: HOSPITAL | Age: 72
Discharge: HOME | End: 2017-10-19
Attending: EMERGENCY MEDICINE | Admitting: INTERNAL MEDICINE
Payer: MEDICARE

## 2017-10-18 DIAGNOSIS — J44.89 COPD (CHRONIC OBSTRUCTIVE PULMONARY DISEASE) WITH CHRONIC BRONCHITIS: Chronic | ICD-10-CM

## 2017-10-18 DIAGNOSIS — I95.9 HYPOTENSION, UNSPECIFIED HYPOTENSION TYPE: ICD-10-CM

## 2017-10-18 DIAGNOSIS — M96.1 CERVICAL POST-LAMINECTOMY SYNDROME: ICD-10-CM

## 2017-10-18 DIAGNOSIS — I95.1 ORTHOSTATIC HYPOTENSION: ICD-10-CM

## 2017-10-18 DIAGNOSIS — N17.9 AKI (ACUTE KIDNEY INJURY): Primary | ICD-10-CM

## 2017-10-18 PROBLEM — J18.9 CAP (COMMUNITY ACQUIRED PNEUMONIA): Status: RESOLVED | Noted: 2017-09-23 | Resolved: 2017-10-18

## 2017-10-18 LAB
ALBUMIN SERPL BCP-MCNC: 2.9 G/DL
ALP SERPL-CCNC: 104 U/L
ALT SERPL W/O P-5'-P-CCNC: 7 U/L
ANION GAP SERPL CALC-SCNC: 10 MMOL/L
APTT BLDCRRT: 26.9 SEC
AST SERPL-CCNC: 13 U/L
BASOPHILS # BLD AUTO: 0.06 K/UL
BASOPHILS NFR BLD: 0.6 %
BILIRUB SERPL-MCNC: 0.2 MG/DL
BILIRUB UR QL STRIP: NEGATIVE
BNP SERPL-MCNC: 68 PG/ML
BUN SERPL-MCNC: 25 MG/DL
CALCIUM SERPL-MCNC: 9.5 MG/DL
CHLORIDE SERPL-SCNC: 100 MMOL/L
CK SERPL-CCNC: 26 U/L
CLARITY UR: CLEAR
CO2 SERPL-SCNC: 23 MMOL/L
COLOR UR: YELLOW
CORTIS SERPL-MCNC: 14.8 UG/DL
CREAT SERPL-MCNC: 2.8 MG/DL
DIFFERENTIAL METHOD: ABNORMAL
EOSINOPHIL # BLD AUTO: 1.3 K/UL
EOSINOPHIL NFR BLD: 12.4 %
ERYTHROCYTE [DISTWIDTH] IN BLOOD BY AUTOMATED COUNT: 12.9 %
EST. GFR  (AFRICAN AMERICAN): 18 ML/MIN/1.73 M^2
EST. GFR  (NON AFRICAN AMERICAN): 16 ML/MIN/1.73 M^2
GLUCOSE SERPL-MCNC: 115 MG/DL
GLUCOSE UR QL STRIP: NEGATIVE
HCT VFR BLD AUTO: 30 %
HGB BLD-MCNC: 9.6 G/DL
HGB UR QL STRIP: NEGATIVE
INR PPP: 0.9
KETONES UR QL STRIP: NEGATIVE
LACTATE SERPL-SCNC: 1.1 MMOL/L
LACTATE SERPL-SCNC: 2.6 MMOL/L
LEUKOCYTE ESTERASE UR QL STRIP: ABNORMAL
LIPASE SERPL-CCNC: 16 U/L
LYMPHOCYTES # BLD AUTO: 1.9 K/UL
LYMPHOCYTES NFR BLD: 17.9 %
MAGNESIUM SERPL-MCNC: 1.9 MG/DL
MCH RBC QN AUTO: 30.5 PG
MCHC RBC AUTO-ENTMCNC: 32 G/DL
MCV RBC AUTO: 95 FL
MICROSCOPIC COMMENT: NORMAL
MONOCYTES # BLD AUTO: 0.9 K/UL
MONOCYTES NFR BLD: 8.1 %
NEUTROPHILS # BLD AUTO: 6.6 K/UL
NEUTROPHILS NFR BLD: 60.6 %
NITRITE UR QL STRIP: NEGATIVE
PH UR STRIP: 7 [PH] (ref 5–8)
PHOSPHATE SERPL-MCNC: 3.6 MG/DL
PLATELET # BLD AUTO: 375 K/UL
PMV BLD AUTO: 9.5 FL
POTASSIUM SERPL-SCNC: 4.9 MMOL/L
PROCALCITONIN SERPL IA-MCNC: 0.11 NG/ML
PROT SERPL-MCNC: 7.3 G/DL
PROT UR QL STRIP: ABNORMAL
PROTHROMBIN TIME: 10 SEC
RBC # BLD AUTO: 3.15 M/UL
SODIUM SERPL-SCNC: 133 MMOL/L
SP GR UR STRIP: 1.01 (ref 1–1.03)
TROPONIN I SERPL DL<=0.01 NG/ML-MCNC: 0.01 NG/ML
TSH SERPL DL<=0.005 MIU/L-ACNC: 0.75 UIU/ML
URN SPEC COLLECT METH UR: ABNORMAL
UROBILINOGEN UR STRIP-ACNC: NEGATIVE EU/DL
WBC # BLD AUTO: 10.83 K/UL
WBC #/AREA URNS HPF: 2 /HPF (ref 0–5)

## 2017-10-18 PROCEDURE — 94761 N-INVAS EAR/PLS OXIMETRY MLT: CPT

## 2017-10-18 PROCEDURE — 83605 ASSAY OF LACTIC ACID: CPT

## 2017-10-18 PROCEDURE — 25000003 PHARM REV CODE 250: Performed by: EMERGENCY MEDICINE

## 2017-10-18 PROCEDURE — 99285 EMERGENCY DEPT VISIT HI MDM: CPT | Mod: 25

## 2017-10-18 PROCEDURE — 25000003 PHARM REV CODE 250: Performed by: STUDENT IN AN ORGANIZED HEALTH CARE EDUCATION/TRAINING PROGRAM

## 2017-10-18 PROCEDURE — 83880 ASSAY OF NATRIURETIC PEPTIDE: CPT

## 2017-10-18 PROCEDURE — 82570 ASSAY OF URINE CREATININE: CPT

## 2017-10-18 PROCEDURE — 80053 COMPREHEN METABOLIC PANEL: CPT

## 2017-10-18 PROCEDURE — 82550 ASSAY OF CK (CPK): CPT

## 2017-10-18 PROCEDURE — 83690 ASSAY OF LIPASE: CPT

## 2017-10-18 PROCEDURE — 82533 TOTAL CORTISOL: CPT

## 2017-10-18 PROCEDURE — G0378 HOSPITAL OBSERVATION PER HR: HCPCS

## 2017-10-18 PROCEDURE — 85610 PROTHROMBIN TIME: CPT

## 2017-10-18 PROCEDURE — 84300 ASSAY OF URINE SODIUM: CPT

## 2017-10-18 PROCEDURE — 96360 HYDRATION IV INFUSION INIT: CPT

## 2017-10-18 PROCEDURE — 93005 ELECTROCARDIOGRAM TRACING: CPT

## 2017-10-18 PROCEDURE — 84100 ASSAY OF PHOSPHORUS: CPT

## 2017-10-18 PROCEDURE — 63600175 PHARM REV CODE 636 W HCPCS: Performed by: STUDENT IN AN ORGANIZED HEALTH CARE EDUCATION/TRAINING PROGRAM

## 2017-10-18 PROCEDURE — 85025 COMPLETE CBC W/AUTO DIFF WBC: CPT

## 2017-10-18 PROCEDURE — 87086 URINE CULTURE/COLONY COUNT: CPT

## 2017-10-18 PROCEDURE — 87040 BLOOD CULTURE FOR BACTERIA: CPT

## 2017-10-18 PROCEDURE — 84145 PROCALCITONIN (PCT): CPT

## 2017-10-18 PROCEDURE — 83735 ASSAY OF MAGNESIUM: CPT

## 2017-10-18 PROCEDURE — 84484 ASSAY OF TROPONIN QUANT: CPT

## 2017-10-18 PROCEDURE — 81000 URINALYSIS NONAUTO W/SCOPE: CPT

## 2017-10-18 PROCEDURE — 85730 THROMBOPLASTIN TIME PARTIAL: CPT

## 2017-10-18 PROCEDURE — 84443 ASSAY THYROID STIM HORMONE: CPT

## 2017-10-18 PROCEDURE — 84540 ASSAY OF URINE/UREA-N: CPT

## 2017-10-18 RX ORDER — FLUTICASONE FUROATE AND VILANTEROL 100; 25 UG/1; UG/1
1 POWDER RESPIRATORY (INHALATION) DAILY
Status: DISCONTINUED | OUTPATIENT
Start: 2017-10-19 | End: 2017-10-19 | Stop reason: HOSPADM

## 2017-10-18 RX ORDER — IBUPROFEN 200 MG
24 TABLET ORAL
Status: DISCONTINUED | OUTPATIENT
Start: 2017-10-18 | End: 2017-10-19 | Stop reason: HOSPADM

## 2017-10-18 RX ORDER — TIZANIDINE 2 MG/1
2 TABLET ORAL EVERY 8 HOURS PRN
Status: DISCONTINUED | OUTPATIENT
Start: 2017-10-18 | End: 2017-10-19 | Stop reason: HOSPADM

## 2017-10-18 RX ORDER — SODIUM CHLORIDE, SODIUM LACTATE, POTASSIUM CHLORIDE, CALCIUM CHLORIDE 600; 310; 30; 20 MG/100ML; MG/100ML; MG/100ML; MG/100ML
INJECTION, SOLUTION INTRAVENOUS ONCE
Status: COMPLETED | OUTPATIENT
Start: 2017-10-18 | End: 2017-10-18

## 2017-10-18 RX ORDER — IBUPROFEN 200 MG
16 TABLET ORAL
Status: DISCONTINUED | OUTPATIENT
Start: 2017-10-18 | End: 2017-10-19 | Stop reason: HOSPADM

## 2017-10-18 RX ORDER — IPRATROPIUM BROMIDE AND ALBUTEROL SULFATE 2.5; .5 MG/3ML; MG/3ML
3 SOLUTION RESPIRATORY (INHALATION) EVERY 4 HOURS PRN
Status: DISCONTINUED | OUTPATIENT
Start: 2017-10-18 | End: 2017-10-19 | Stop reason: HOSPADM

## 2017-10-18 RX ORDER — OXYCODONE AND ACETAMINOPHEN 10; 325 MG/1; MG/1
1 TABLET ORAL EVERY 8 HOURS PRN
Status: DISCONTINUED | OUTPATIENT
Start: 2017-10-18 | End: 2017-10-19 | Stop reason: HOSPADM

## 2017-10-18 RX ORDER — HEPARIN SODIUM 5000 [USP'U]/ML
5000 INJECTION, SOLUTION INTRAVENOUS; SUBCUTANEOUS EVERY 12 HOURS
Status: DISCONTINUED | OUTPATIENT
Start: 2017-10-18 | End: 2017-10-19 | Stop reason: HOSPADM

## 2017-10-18 RX ORDER — GLUCAGON 1 MG
1 KIT INJECTION
Status: DISCONTINUED | OUTPATIENT
Start: 2017-10-18 | End: 2017-10-19 | Stop reason: HOSPADM

## 2017-10-18 RX ADMIN — OXYCODONE HYDROCHLORIDE AND ACETAMINOPHEN 1 TABLET: 10; 325 TABLET ORAL at 10:10

## 2017-10-18 RX ADMIN — SODIUM CHLORIDE, SODIUM LACTATE, POTASSIUM CHLORIDE, AND CALCIUM CHLORIDE: .6; .31; .03; .02 INJECTION, SOLUTION INTRAVENOUS at 10:10

## 2017-10-18 RX ADMIN — SODIUM CHLORIDE 1782 ML: 0.9 INJECTION, SOLUTION INTRAVENOUS at 04:10

## 2017-10-18 RX ADMIN — HEPARIN SODIUM 5000 UNITS: 5000 INJECTION, SOLUTION INTRAVENOUS; SUBCUTANEOUS at 10:10

## 2017-10-18 RX ADMIN — TRAZODONE HYDROCHLORIDE 25 MG: 50 TABLET ORAL at 10:10

## 2017-10-18 RX ADMIN — PAROXETINE HYDROCHLORIDE 30 MG: 20 TABLET, FILM COATED ORAL at 11:10

## 2017-10-18 NOTE — ED PROVIDER NOTES
Encounter Date: 10/18/2017       History     Chief Complaint   Patient presents with    Hypotension     sent to ED by pulmonology with hypotension and shortness of breath     Patient was sent down from Pulmonary clinic with feeling weak, hypotension with SBP in 70s, and an episode of weakness.  She was recently hospitalized with pneumonia.  She states that she has been having chest pain constantly since she developed pnuemonia.  She is feeling weak.  No fevers.          Review of patient's allergies indicates:   Allergen Reactions    Aspirin      Other reaction(s): hx of ulcers    Penicillins      Other reaction(s): Hives  Other reaction(s): Rash  Other reaction(s): Rash  Other reaction(s): Hives    Tetracycline      Other reaction(s): Swelling     Past Medical History:   Diagnosis Date    Anemia     Cataract     Chronic LBP 7/26/2012    Chronic pain     CKD (chronic kidney disease)     COPD (chronic obstructive pulmonary disease)     Dehydration     Lumbar spondylosis     Melanoma     of the lip    Migraines, neuralgic     Osteoporosis     Primary osteoarthritis of both knees     s/p Rt TKA    Subdeltoid bursitis, L>R. 9/27/2012    Ulcer     Vitamin D deficiency disease      Past Surgical History:   Procedure Laterality Date    BLADDER SUSPENSION      CATARACT EXTRACTION  11/18/13    left eye    CERVICAL LAMINECTOMY      x3, fusion x1    COLONOSCOPY  2009    HYSTERECTOMY      JOINT REPLACEMENT  2001    total right knee     LUMBAR LAMINECTOMY      x 3, fusion x1     Family History   Problem Relation Age of Onset    Arthritis Mother     Stroke Mother     Hypertension Father     Cancer Father     Cataracts Father     Diabetes Maternal Aunt     Hypertension Maternal Grandfather     Heart disease Maternal Grandfather     Cataracts Sister     Glaucoma Cousin     Amblyopia Neg Hx     Blindness Neg Hx     Macular degeneration Neg Hx     Retinal detachment Neg Hx     Strabismus Neg  Hx      Social History   Substance Use Topics    Smoking status: Former Smoker     Types: Cigarettes    Smokeless tobacco: Former User    Alcohol use Yes      Comment: Rare     Review of Systems   Constitutional: Negative for chills and fever.   HENT: Negative for congestion.    Eyes: Negative for photophobia.   Respiratory: Positive for cough and shortness of breath.    Cardiovascular: Positive for chest pain. Negative for palpitations and leg swelling.   Gastrointestinal: Negative for abdominal pain.   Endocrine: Negative for polydipsia and polyuria.   Genitourinary: Negative for difficulty urinating.   Musculoskeletal: Negative for back pain.   Skin: Negative for rash.   Neurological: Negative for numbness.   Hematological: Negative for adenopathy.   Psychiatric/Behavioral: Negative for agitation.       Physical Exam     Initial Vitals [10/18/17 1618]   BP Pulse Resp Temp SpO2   (!) 71/46 66 18 98.1 °F (36.7 °C) (!) 94 %      MAP       54.33         Physical Exam    Vitals reviewed.  Constitutional: She appears well-developed and well-nourished.   HENT:   Head: Normocephalic.   Eyes: EOM are normal. Pupils are equal, round, and reactive to light.   Neck: Normal range of motion. Neck supple.   Cardiovascular: Normal rate, regular rhythm and normal heart sounds.   Pulmonary/Chest: Breath sounds normal. She has no rales. She exhibits tenderness.   Abdominal: Soft. Bowel sounds are normal. She exhibits no distension and no mass. There is no tenderness. There is no rebound and no guarding.   Musculoskeletal: Normal range of motion. She exhibits no edema.   Neurological: She is alert. She has normal strength. No sensory deficit.   Skin: Skin is warm and dry. Capillary refill takes less than 2 seconds.         ED Course   Procedures  Labs Reviewed   CBC W/ AUTO DIFFERENTIAL - Abnormal; Notable for the following:        Result Value    RBC 3.15 (*)     Hemoglobin 9.6 (*)     Hematocrit 30.0 (*)     Platelets 375 (*)      Eos # 1.3 (*)     Lymph% 17.9 (*)     Eosinophil% 12.4 (*)     All other components within normal limits   COMPREHENSIVE METABOLIC PANEL - Abnormal; Notable for the following:     Sodium 133 (*)     Glucose 115 (*)     BUN, Bld 25 (*)     Creatinine 2.8 (*)     Albumin 2.9 (*)     ALT 7 (*)     eGFR if  18 (*)     eGFR if non  16 (*)     All other components within normal limits   LACTIC ACID, PLASMA - Abnormal; Notable for the following:     Lactate (Lactic Acid) 2.6 (*)     All other components within normal limits   URINALYSIS - Abnormal; Notable for the following:     Protein, UA Trace (*)     Leukocytes, UA Trace (*)     All other components within normal limits   CULTURE, BLOOD   CULTURE, BLOOD   CULTURE, URINE   APTT   B-TYPE NATRIURETIC PEPTIDE   LIPASE   MAGNESIUM   PHOSPHORUS   PROTIME-INR   TROPONIN I   TSH   URINALYSIS MICROSCOPIC   CORTISOL, RANDOM   LACTIC ACID, PLASMA   PROCALCITONIN     EKG Readings: (Independently Interpreted)   Rhythm: Normal Sinus Rhythm. Heart Rate: 68. Ectopy: No Ectopy. Conduction: Normal. ST Segments: Normal ST Segments. T Waves: Normal. Clinical Impression: Normal Sinus Rhythm          Medical Decision Making:   ED Management:  Reviewed studies.  Normal WBC.  CXR - resolving pneumonia,.   Clinically I doubt infection or sepsis.  Likely hypovolemia.  Hypotension resolved with IV fluids.  Does have YOSVANY.  Will consult medicine for admission and IV fluids.              Attending Attestation:         Attending Critical Care:   Critical Care Times:   Direct Patient Care (initial evaluation, reassessments, and time considering the case)................................................................24 minutes.   Additional History from reviewing old medical records or taking additional history from the family, EMS, PCP, etc.......................4 minutes.   Ordering, Reviewing, and Interpreting Diagnostic  Studies...............................................................................................................4 minutes.   Documentation..................................................................................................................................................................................3 minutes.   Consultation with other Physicians. .................................................................................................................................................3 minutes.   ==============================================================  · Total Critical Care Time - exclusive of procedural time: 38 minutes.  ==============================================================              ED Course      Clinical Impression:   The primary encounter diagnosis was YOSVANY (acute kidney injury). A diagnosis of Hypotension, unspecified hypotension type was also pertinent to this visit.    Disposition:   Disposition: Admitted  Condition: Stable                        Kali Montenegro MD  10/18/17 6835

## 2017-10-18 NOTE — ED NOTES
Pt presents to ED with c/o hypotension. Pt was referred to ED by pulmonologist today at follow up appt after being discharged from hospital for admission of pneumonia. Pt was hypotensive and visit and was sent to ED for further evaluation. Pt is asymptomatic of BP and denies any weakness, dizziness or fatigue. Pt does state that after her discharge from hospital she has continued to feel bad and does not think that she is getting any better. Pt states she had one episode of vomiting this AM. No acute distress noted. Pt AAO. Pt placed on cardiac monitor, bp cuff and continuous pulse ox.

## 2017-10-19 VITALS
DIASTOLIC BLOOD PRESSURE: 60 MMHG | OXYGEN SATURATION: 95 % | TEMPERATURE: 97 F | RESPIRATION RATE: 16 BRPM | SYSTOLIC BLOOD PRESSURE: 132 MMHG | BODY MASS INDEX: 23.04 KG/M2 | WEIGHT: 130.06 LBS | HEART RATE: 104 BPM | HEIGHT: 63 IN

## 2017-10-19 PROBLEM — I95.1 ORTHOSTATIC HYPOTENSION: Status: RESOLVED | Noted: 2017-10-18 | Resolved: 2017-10-19

## 2017-10-19 PROBLEM — N17.9 AKI (ACUTE KIDNEY INJURY): Status: RESOLVED | Noted: 2017-10-18 | Resolved: 2017-10-19

## 2017-10-19 LAB
ALBUMIN SERPL BCP-MCNC: 2.4 G/DL
ALP SERPL-CCNC: 88 U/L
ALT SERPL W/O P-5'-P-CCNC: 6 U/L
ANION GAP SERPL CALC-SCNC: 6 MMOL/L
AST SERPL-CCNC: 10 U/L
BACTERIA UR CULT: NO GROWTH
BASOPHILS # BLD AUTO: 0.05 K/UL
BASOPHILS NFR BLD: 0.7 %
BILIRUB SERPL-MCNC: 0.2 MG/DL
BUN SERPL-MCNC: 23 MG/DL
CALCIUM SERPL-MCNC: 8.5 MG/DL
CHLORIDE SERPL-SCNC: 109 MMOL/L
CO2 SERPL-SCNC: 23 MMOL/L
CREAT SERPL-MCNC: 2 MG/DL
CREAT UR-MCNC: 36.7 MG/DL
DIFFERENTIAL METHOD: ABNORMAL
EOSINOPHIL # BLD AUTO: 1 K/UL
EOSINOPHIL NFR BLD: 13.3 %
ERYTHROCYTE [DISTWIDTH] IN BLOOD BY AUTOMATED COUNT: 12.9 %
EST. GFR  (AFRICAN AMERICAN): 28 ML/MIN/1.73 M^2
EST. GFR  (NON AFRICAN AMERICAN): 24 ML/MIN/1.73 M^2
GLUCOSE SERPL-MCNC: 85 MG/DL
HCT VFR BLD AUTO: 25.5 %
HGB BLD-MCNC: 8.2 G/DL
LYMPHOCYTES # BLD AUTO: 2.1 K/UL
LYMPHOCYTES NFR BLD: 28.6 %
MCH RBC QN AUTO: 30.5 PG
MCHC RBC AUTO-ENTMCNC: 32.2 G/DL
MCV RBC AUTO: 95 FL
MONOCYTES # BLD AUTO: 0.8 K/UL
MONOCYTES NFR BLD: 10.4 %
NEUTROPHILS # BLD AUTO: 3.4 K/UL
NEUTROPHILS NFR BLD: 46.7 %
PLATELET # BLD AUTO: 330 K/UL
PMV BLD AUTO: 9.2 FL
POTASSIUM SERPL-SCNC: 4.2 MMOL/L
PROCALCITONIN SERPL IA-MCNC: 0.11 NG/ML
PROT SERPL-MCNC: 5.9 G/DL
RBC # BLD AUTO: 2.69 M/UL
SODIUM SERPL-SCNC: 138 MMOL/L
SODIUM UR-SCNC: 57 MMOL/L
UUN UR-MCNC: 251 MG/DL
WBC # BLD AUTO: 7.21 K/UL

## 2017-10-19 PROCEDURE — 97116 GAIT TRAINING THERAPY: CPT

## 2017-10-19 PROCEDURE — 80053 COMPREHEN METABOLIC PANEL: CPT

## 2017-10-19 PROCEDURE — 90471 IMMUNIZATION ADMIN: CPT | Performed by: INTERNAL MEDICINE

## 2017-10-19 PROCEDURE — 63600175 PHARM REV CODE 636 W HCPCS: Performed by: STUDENT IN AN ORGANIZED HEALTH CARE EDUCATION/TRAINING PROGRAM

## 2017-10-19 PROCEDURE — 84145 PROCALCITONIN (PCT): CPT

## 2017-10-19 PROCEDURE — 97535 SELF CARE MNGMENT TRAINING: CPT

## 2017-10-19 PROCEDURE — 85025 COMPLETE CBC W/AUTO DIFF WBC: CPT

## 2017-10-19 PROCEDURE — 63600175 PHARM REV CODE 636 W HCPCS: Performed by: INTERNAL MEDICINE

## 2017-10-19 PROCEDURE — 25000242 PHARM REV CODE 250 ALT 637 W/ HCPCS: Performed by: STUDENT IN AN ORGANIZED HEALTH CARE EDUCATION/TRAINING PROGRAM

## 2017-10-19 PROCEDURE — 97165 OT EVAL LOW COMPLEX 30 MIN: CPT

## 2017-10-19 PROCEDURE — 97161 PT EVAL LOW COMPLEX 20 MIN: CPT

## 2017-10-19 PROCEDURE — G8988 SELF CARE GOAL STATUS: HCPCS | Mod: CJ

## 2017-10-19 PROCEDURE — G8979 MOBILITY GOAL STATUS: HCPCS | Mod: CJ

## 2017-10-19 PROCEDURE — 90662 IIV NO PRSV INCREASED AG IM: CPT | Performed by: INTERNAL MEDICINE

## 2017-10-19 PROCEDURE — G0378 HOSPITAL OBSERVATION PER HR: HCPCS

## 2017-10-19 PROCEDURE — 94761 N-INVAS EAR/PLS OXIMETRY MLT: CPT

## 2017-10-19 PROCEDURE — 94640 AIRWAY INHALATION TREATMENT: CPT

## 2017-10-19 PROCEDURE — G8987 SELF CARE CURRENT STATUS: HCPCS | Mod: CJ

## 2017-10-19 PROCEDURE — G8978 MOBILITY CURRENT STATUS: HCPCS | Mod: CK

## 2017-10-19 PROCEDURE — 36415 COLL VENOUS BLD VENIPUNCTURE: CPT

## 2017-10-19 PROCEDURE — 25000003 PHARM REV CODE 250: Performed by: STUDENT IN AN ORGANIZED HEALTH CARE EDUCATION/TRAINING PROGRAM

## 2017-10-19 PROCEDURE — G8989 SELF CARE D/C STATUS: HCPCS | Mod: CJ

## 2017-10-19 PROCEDURE — G0008 ADMIN INFLUENZA VIRUS VAC: HCPCS | Performed by: INTERNAL MEDICINE

## 2017-10-19 RX ORDER — FLUTICASONE PROPIONATE 50 MCG
1 SPRAY, SUSPENSION (ML) NASAL DAILY
Qty: 1 BOTTLE | Refills: 0 | Status: SHIPPED | OUTPATIENT
Start: 2017-10-19 | End: 2018-07-31

## 2017-10-19 RX ADMIN — PAROXETINE HYDROCHLORIDE 30 MG: 20 TABLET, FILM COATED ORAL at 10:10

## 2017-10-19 RX ADMIN — INFLUENZA A VIRUSA/MICHIGAN/45/2015 X-275 (H1N1) ANTIGEN (FORMALDEHYDE INACTIVATED), INFLUENZA A VIRUS A/HONG KONG/4801/2014 X-263B (H3N2) ANTIGEN (FORMALDEHYDE INACTIVATED), AND INFLUENZA B VIRUS B/BRISBANE/60/2008 ANTIGEN (FORMALDEHYDE INACTIVATED) 0.5 ML: 60; 60; 60 INJECTION, SUSPENSION INTRAMUSCULAR at 02:10

## 2017-10-19 RX ADMIN — FLUTICASONE FUROATE AND VILANTEROL TRIFENATATE 1 PUFF: 100; 25 POWDER RESPIRATORY (INHALATION) at 08:10

## 2017-10-19 RX ADMIN — HEPARIN SODIUM 5000 UNITS: 5000 INJECTION, SOLUTION INTRAVENOUS; SUBCUTANEOUS at 10:10

## 2017-10-19 RX ADMIN — OXYCODONE HYDROCHLORIDE AND ACETAMINOPHEN 1 TABLET: 10; 325 TABLET ORAL at 06:10

## 2017-10-19 NOTE — PLAN OF CARE
Problem: Patient Care Overview  Goal: Plan of Care Review  BBS were clear and decreased.  SpO2 was 96% on room air.  Patient instructed on correct use of MDI.  Will continue to monitor SpO2 and BBS

## 2017-10-19 NOTE — PLAN OF CARE
Future Appointments  Date Time Provider Department Center   12/5/2017 2:00 PM Pushpa Mcmahon MD [facility] Wylie     Spoke to patient about priority care clinic and patient agrees to pirority care clinic appointment. Will obtain and place on AVS.   and bedside, helps patient at home and agrees.  No preference for home health agency. Will use [facility].       10/19/17 1158   Discharge Assessment   Assessment Type Discharge Planning Assessment   Confirmed/corrected address and phone number on facesheet? Yes   Assessment information obtained from? Patient   Prior to hospitilization cognitive status: Alert/Oriented   Prior to hospitalization functional status: Independent   Current cognitive status: Alert/Oriented   Current Functional Status: Independent   Equipment Currently Used at Home walker, rolling;cane, straight;wheelchair  (walkin tub (wheelchair is father's)- prior to admission doesn't use any dme)   Do you have any problems affording any of your prescribed medications? No   Is the patient taking medications as prescribed? yes   Does the patient have transportation home? Yes   Transportation Available family or friend will provide   Does the patient receive services at the [facility]? No   Discharge Plan A Home;Home with family   Discharge Plan B Home;Home with family;Home Health   Patient/Family In Agreement With Plan yes     Virginia Schmitt RN, CCM, CMSRN  RN Transition Navigator  [phone]

## 2017-10-19 NOTE — ED NOTES
Report received from DANIEL Moise. Bedside report completed. Pt easily woken. rr even and unlabored. Pt with a good productive cough. Pt denies pain at this time. Call light and belongings within reach. Visitor at bedside. Will continue to monitor.

## 2017-10-19 NOTE — PLAN OF CARE
Problem: Occupational Therapy Goal  Goal: Occupational Therapy Goal  Goals to be met by: 11/19/17     Patient will increase functional independence with ADLs by performing:    LE Dressing with Modified [place].  Grooming while standing with Supervision.  Toileting from toilet with Supervision for hygiene and clothing management.   Supine to sit with Supervision.  Stand pivot transfers with Supervision.  Toilet transfer to toilet with Supervision.  Increased functional strength to WFL for self care skills and functional mobility.  Upper extremity exercise program x10 reps per handout, with independence.    Outcome: Ongoing (interventions implemented as appropriate)  Pt would benefit from cont OT services in order to maximize functional independence. Pt owns all recommended DME. Home when medically stable with HHOT/PT

## 2017-10-19 NOTE — PT/OT/SLP EVAL
Occupational Therapy  Evaluation/Treatment     Katie Quevedo   MRN: [id number]   Admitting Diagnosis: Orthostatic hypotension    OT Date of Treatment: 10/19/17   OT Start Time: 0948  OT Stop Time: 1013  OT Total Time (min): 25 min    Billable Minutes:  Evaluation 10  Self Care/Home Management 15    Diagnosis: Orthostatic hypotension   The primary encounter diagnosis was YOSVANY (acute kidney injury). Diagnoses of Hypotension, unspecified hypotension type and Orthostatic hypotension were also pertinent to this visit.      Past Medical History:   Diagnosis Date    Anemia     Cataract     Chronic LBP 7/26/2012    Chronic pain     CKD (chronic kidney disease)     COPD (chronic obstructive pulmonary disease)     Dehydration     Encounter for blood transfusion     Lumbar spondylosis     Melanoma     of the lip    Migraines, neuralgic     Osteoporosis     Primary osteoarthritis of both knees     s/p Rt TKA    Seizures     Subdeltoid bursitis, L>R. 9/27/2012    Ulcer     Vitamin D deficiency disease       Past Surgical History:   Procedure Laterality Date    BLADDER SUSPENSION      CATARACT EXTRACTION  11/18/13    left eye    CERVICAL LAMINECTOMY      x3, fusion x1    COLONOSCOPY  2009    HYSTERECTOMY      JOINT REPLACEMENT  2001    total right knee     LUMBAR LAMINECTOMY      x 3, fusion x1           General Precautions: Standard, fall  Orthopedic Precautions: N/A  Braces:            Patient History:  Living Environment  Lives With: spouse  Living Arrangements: house  Home Accessibility: stairs to enter home  Number of Stairs to Enter Home: 2  Stair Railings at Home: none  Living Environment Comment: Pt lives with spouse in [facility], 2 steps to enter, no rail, walk in/handicap tub with built seat and bars. Pt has access to rollator and RW. Pt reports (I) as PLOF, drives.  Equipment Currently Used at Home: walker, rolling, rollator    Prior level of function:   Bed Mobility/Transfers: independent  Grooming:  "independent  Bathing: needs device  Upper Body Dressing: independent  Lower Body Dressing: independent  Toileting: independent  Home Management Skills: independent  Driving License: Yes  Mode of Transportation: Car     Dominant hand: right    Subjective:  Communicated with nsg prior to session.  Pt states, " I wish I could get some sleep"   Chief Complaint: SOB, weakness, fatigue   Patient/Family stated goals: return to PLOF     Pain/Comfort  Pain Rating 1: 8/10  Location 1:  (reports chronic neck and back pain )  Pain Addressed 1: Distraction, Cessation of Activity, Nurse notified    Objective:       Cognitive Exam:  Oriented to: Person, Place, Time and Situation  Follows Commands/attention: Follows multistep  commands  Communication: clear/fluent  Memory:  No Deficits noted  Safety awareness/insight to disability: intact  Coping skills/emotional control: Appropriate to situation    Visual/perceptual:  Intact    Physical Exam:  Postural examination/scapula alignment: Rounded shoulder and Head forward  Skin integrity: Visible skin intact  Edema: None noted     Sensation:   Intact    Upper Extremity Range of Motion:  Right Upper Extremity: WFL  Left Upper Extremity: WFL    Upper Extremity Strength:  Right Upper Extremity: 3+/5  Left Upper Extremity: 3+/5    Strength: good     Fine motor coordination:   Intact    Gross motor coordination:NT     Functional Mobility:  Bed Mobility:  Scooting/Bridging: Stand by Assistance  Supine to Sit: Stand by Assistance  Sit to Supine: Stand by Assistance    Transfers:  Sit <> Stand Assistance: Stand By Assistance, Contact Guard Assistance  Sit <> Stand Assistive Device: No Assistive Device  Toilet Transfer Technique: Stand Pivot  Toilet Transfer Assistance: Contact Guard Assistance  Toilet Transfer Assistive Device: No Assistive Device    Functional Ambulation: CGA with HHA; ~ 25 feet during session     Activities of Daily Living:    UE Dressing Level of Assistance: Stand by " "assistance  LE Dressing Level of Assistance: Stand by assistance  Grooming Position: Standing  Grooming Level of Assistance: Contact guard assistance  Toileting Where Assessed: Toilet  Toileting Level of Assistance: Contact guard         Balance:   Static Sit: NORMAL: No deviations seen in posture held statically  Dynamic Sit: NORMAL: No deviations seen in posture held dynamically  Static Stand: FAIR+: Takes MINIMAL challenges from all directions/FAIR   Dynamic stand: FAIR: Needs CONTACT GUARD during gait    Therapeutic Activities and Exercises:  Functional mobility in room; standing tolerance during ADLs; education on energy conservation     AM-[facility] 6 CLICK ADL  How much help from another person does this patient currently need?  1 = Unable, Total/Dependent Assistance  2 = A lot, Maximum/Moderate Assistance  3 = A little, Minimum/Contact Guard/Supervision  4 = None, Modified [place]/Independent    Putting on and taking off regular lower body clothing? : 3  Bathing (including washing, rinsing, drying)?: 3  Toileting, which includes using toilet, bedpan, or urinal? : 3  Putting on and taking off regular upper body clothing?: 4  Taking care of personal grooming such as brushing teeth?: 4  Eating meals?: 4  Total Score: 21    -PAC Raw Score CMS "G-Code Modifier Level of Impairment Assistance   6 % Total / Unable   7 - 9 CM 80 - 100% Maximal Assist   10-14 CL 60 - 80% Moderate Assist   15 - 19 CK 40 - 60% Moderate Assist   20 - 22 CJ 20 - 40% Minimal Assist   23 CI 1-20% SBA / CGA   24 CH 0% Independent/ Mod I       Patient left supine with all lines intact, call button in reach, bed alarm on and nsg and spouse  present    Assessment:  Katie Quevedo is a 72 y.o. female with a medical diagnosis of Orthostatic hypotension and presents with deconditioning. Pt would benefit from cont OT services in order to maximize functional independence. Pt owns all recommended DME. Home when medically stable with HHOT/PT "     Rehab identified problem list/impairments: Rehab identified problem list/impairments: weakness, impaired balance, impaired endurance, gait instability, impaired cardiopulmonary response to activity, impaired functional mobilty, impaired self care skills, pain    Rehab potential is good.    Activity tolerance: Fair + 2/2 SOB     Discharge recommendations: Discharge Facility/Level Of Care Needs: home health PT, home health OT     Barriers to discharge: Barriers to Discharge: None    Equipment recommendations: none     GOALS:    Occupational Therapy Goals        Problem: Occupational Therapy Goal    Goal Priority Disciplines Outcome Interventions   Occupational Therapy Goal     OT, PT/OT Ongoing (interventions implemented as appropriate)    Description:  Goals to be met by: 11/19/17     Patient will increase functional independence with ADLs by performing:    LE Dressing with Modified [place].  Grooming while standing with Supervision.  Toileting from toilet with Supervision for hygiene and clothing management.   Supine to sit with Supervision.  Stand pivot transfers with Supervision.  Toilet transfer to toilet with Supervision.  Increased functional strength to WFL for self care skills and functional mobility.  Upper extremity exercise program x10 reps per handout, with independence.                      PLAN:  Patient to be seen 5 x/week to address the above listed problems via self-care/home management, therapeutic activities, therapeutic groups  Plan of Care expires: 11/19/17  Plan of Care reviewed with: patient, spouse    OT G-codes  Functional Assessment Tool Used: am pac  Score: 21  Functional Limitation: Self care  Self Care Current Status (): WESTON  Self Care Goal Status (): WESTON Baum OT  10/19/2017

## 2017-10-19 NOTE — PLAN OF CARE
Patient discharge instructions given and reviewed. Med rec reviewed with Patient. Patient verbalized understanding. Education provided on new medication and diagnosis and follow-up appointments. PIV d/carson tip intact. Pt tolerated well.  Tele removed.  Awaiting transportation home.

## 2017-10-19 NOTE — PLAN OF CARE
Problem: Physical Therapy Goal  Goal: Physical Therapy Goal  Goals to be met by: 17     Patient will increase functional independence with mobility by performin. Supine <> sit with Modified [place]  2. Sit to stand transfer with Supervision  3. Bed to chair transfer with Stand-by Assistance using Rolling Walker  4. Gait  x 150 feet with Stand-by Assistance using Rolling Walker.   5. Lower extremity exercise program x 10 reps per handout, with supervision    Outcome: Ongoing (interventions implemented as appropriate)  PT evaluation completed and pt will benefit from skilled PT services to address pt's functional limitations and barriers.

## 2017-10-19 NOTE — DISCHARGE SUMMARY
[facility] Internal Medicine Discharge Summary    Primary Team: [facility] Medicine Team B  Attending Physician: Gaston Alfred MD  Resident: Dr. Rosas  Intern: Dr. Baldwin    Date of Admit: 10/18/2017  Date of Discharge: 10/19/2017    Discharge to: Home with home health  Condition: stable    Discharge Diagnoses     Patient Active Problem List   Diagnosis    Melanoma    Chronic pain    Lumbar spondylosis    Vitamin deficiency    Cervical post-laminectomy syndrome    Lumbar postlaminectomy syndrome    Chronic neck pain    Lumbosacral spondylosis without myelopathy    Isolated cervical dystonia    Primary osteoarthritis of both knees    Subdeltoid bursitis, L>R.    Other fragments of torsion dystonia    Nuclear sclerosis - Both Eyes    Rotator cuff tear, right    Post-operative state    Muscle spasm    Intractable migraine    Biceps tendonitis    CKD (chronic kidney disease)    COPD (chronic obstructive pulmonary disease) with chronic bronchitis    UTI symptoms    Osteoarthritis, hip, bilateral    Lumbar radiculopathy, BLE    Cervical radiculopathy, BUE    Osteoporosis    Chronic low back pain    Pruritus    COPD exacerbation    Hypoxia    YOSVANY (acute kidney injury)    Orthostatic hypotension       Consultants and Procedures     Consultants:  None    Procedures:   None    Brief History of Present Illness      Katie Quevedo is a 72 y.o.  female who  has a past medical history of Anemia; Cataract; Chronic LBP (7/26/2012); Chronic pain; CKD (chronic kidney disease); COPD (chronic obstructive pulmonary disease); Dehydration; Encounter for blood transfusion; Lumbar spondylosis; Melanoma; Migraines, neuralgic; Osteoporosis; Primary osteoarthritis of both knees; Seizures; Subdeltoid bursitis, L>R. (9/27/2012); Ulcer; and Vitamin D deficiency disease.  The patient presented to [facility] on 10/18/2017 with a primary complaint of Hypotension (sent to ED by pulmonology with  hypotension and shortness of breath)    Patient was at her Pulmonologist's office for a follow up visit for PNA today, when she stood up to walk back to the exam room and felt so lightheaded and weak that she fell. She was caught by others around her, so she did not fall to the ground. She states that she's felt lightheaded with standing for about the past week. Denies CP, nausea, vomiting, LOC, fall to ground, diaphoresis, or hot flash just prior to feeling lightheaded. She denies her vision changing. Also denies recent fevers, chills, diarrhea, dysuria. When she stands up, she reports her heart racing and feeling a little short of breath.     She has some on-going cough, and diffuse chest pain from the persistent coughing, from the PNA that she was treated for at the end of September.    Discharge Subjective  Patient feeling better this morning, but states that she wasn't able to get much sleep overnight. Was able to get up to use the restroom without lightheadeness. PO intake improved. Only complaint is persistent     Discharge Exam  Vitals:      10/19/17 0825 10/19/17 0837   BP:   132/60    BP Location:   Right arm    Patient Position:   Lying    Pulse:   106 104   Resp:   (!) 21 16   Temp:   96.8 °F (36 °C)    TempSrc:   Oral    SpO2:    96%   Weight:       Height:         General appearance: alert, appears stated age and cooperative  Head: Normocephalic, without obvious abnormality, atraumatic  Eyes: conjunctivae/corneas clear. PERRL, EOM's intact.  Throat: lips, mucosa, and tongue normal; teeth and gums normal  Chest: pain on palpation of chest on both right and left  Lungs: CTABL, good air movement throughout  Heart: regular rate and rhythm, S1, S2 normal, no murmur, click, rub or gallop  Abdomen: soft, non-tender; bowel sounds normal; no masses,  no organomegaly  Extremities: extremities normal, atraumatic, no cyanosis or edema  Pulses: 2+ and symmetric  Skin: skin color and texture normal, normal  johann  Neurologic: Grossly normal      Hospital Course By Problem with Pertinent Findings     1. Orthostatic hypotension - Patient's symptoms fit most closely with orthostatic hypotension. She presented to the ED with hypotension to 71/46 while sitting. On exam, orthostatics positive by symptoms after 1L of fluids. Improved to 123/64 with fluids. Sating well on RA. Bedside ultrasound performed and also showed >50% collapse of IVC with inspiration. Patient without significant cardiac history. TNI negative and BNP 68. Patient's symptoms and BP improved and lightheadedness resolved with IV fluids.     2. YOSVANY - Creatinine 2.8 on admission, patient's baseline appears to be ~1.9. Suspect this is pre-renal from hypovolemia. Urine studies confirmed pre-renal etiology. Creatinine improved back to 2.0 overnight with fluids.     3. Hyponatremia - 133 on admission. Likely 2/2 poor PO intake. Improved to 138 overnight.     4. MSK Chest pain - reproducible on exam, likely related to persistent cough. Patient already on a significant pain regimen for chronic back pain. Will continue home medications. Recommend follow up with      5. COPD - no increased cough or sputum production, no respiratory distress. Not in acute exacerbation. Will continue home inhalers.     6. Chronic back pain - patient on significant amount of opiates at home for chronic back pain. No acute issues with back pain currently, will continue home medications.     7. Normocytic anemia - Patient appears to be above baseline Hg of ~8.5, suspect this is due to volume contraction given increased total protein as well. Asymptomatic at this point, possible related to poor nutrition and previous malignancy history. Iron studies consistent with AOCD.    Discharge Medications        Medication List      ASK your doctor about these medications    cetirizine 10 MG tablet  Commonly known as:  ZYRTEC  Take 1 tablet (10 mg total) by mouth once daily.      fluticasone-salmeterol 250-50 mcg/dose 250-50 mcg/dose diskus inhaler  Commonly known as:  ADVAIR  Inhale 1 puff into the lungs 2 (two) times daily.     ipratropium-albuterol  mcg/actuation inhaler  Commonly known as:  COMBIVENT  Inhale 1 puff into the lungs every 4 (four) hours as needed for Wheezing. Rescue     morphine 15 MG 12 hr tablet  Commonly known as:  MS CONTIN  Take 1 tablet (15 mg total) by mouth 2 (two) times daily.     moxifloxacin 400 mg tablet  Commonly known as:  AVELOX  Take 1 tablet (400 mg total) by mouth once daily.     oxycodone-acetaminophen  mg per tablet  Commonly known as:  PERCOCET  TAKE 1 TABLET BY MOUTH THREE TIMES A DAY AS NEEDED FOR PAIN     paroxetine 30 MG tablet  Commonly known as:  PAXIL     tizanidine 4 MG tablet  Commonly known as:  ZANAFLEX  TAKE 1-2 TABLETS BY MOUTH THREE TIMES A DAY AS NEEDED     trazodone 50 MG tablet  Commonly known as:  DESYREL  TAKE 1-2 TABLETS BY MOUTH NIGHTLY AS NEEDED FOR INSOMNIA     tretinoin 0.1 % cream  Commonly known as:  RETIN-A  Use hs     triamcinolone acetonide 0.1% 0.1 % cream  Commonly known as:  KENALOG  Use hs prn itching     VALIUM ORAL            Discharge Information:   Diet:  Regular    Physical Activity:  As tolerated    Instructions:  1. Take all medications as prescribed  2. Keep all follow-up appointments  3. Return to the hospital or call your primary care physicians if any worsening symptoms such as lightheadedness, loss of consciousness, falls, shortness of breath, or chest pain occur.    Follow-Up Appointments:  Dr. Chavarria within 2 weeks  Reschedule Pul appointment with Dr. Tanmay Queen for colonoscopy follow up    Ana Baldwin MD  [facility] Internal Medicine/Emergency Medicine, [facility]

## 2017-10-19 NOTE — PLAN OF CARE
Future Appointments  Date Time Provider Department Center   10/27/2017 2:00 PM Clemencia Gambino MD [facility]   12/5/2017 2:00 PM Pushpa Mcmahon MD [facility]        10/19/17 1558   Final Note   Assessment Type Final Discharge Note   Discharge Disposition Home-Health   Hospital Follow Up  Appt(s) scheduled? Yes   Discharge plans and expectations educations in teach back method with documentation complete? Yes   Right Care Referral Info   Referral Type home-care   Facility Name [facility]     Virginia Schmitt, RN, CCM, CMSRN  RN Transition Navigator  [phone]

## 2017-10-19 NOTE — PT/OT/SLP EVAL
Physical Therapy  Evaluation    Katie Quevedo   MRN: [id number]   Admitting Diagnosis: Orthostatic hypotension    PT Received On: 10/19/17  PT Start Time: 0907     PT Stop Time: 0932    PT Total Time (min): 25 min       Billable Minutes:  Evaluation 15 and Gait Training 10    Diagnosis: Orthostatic hypotension  The primary encounter diagnosis was YOSVANY (acute kidney injury). Diagnoses of Hypotension, unspecified hypotension type and Orthostatic hypotension were also pertinent to this visit.    Past Medical History:   Diagnosis Date    Anemia     Cataract     Chronic LBP 7/26/2012    Chronic pain     CKD (chronic kidney disease)     COPD (chronic obstructive pulmonary disease)     Dehydration     Encounter for blood transfusion     Lumbar spondylosis     Melanoma     of the lip    Migraines, neuralgic     Osteoporosis     Primary osteoarthritis of both knees     s/p Rt TKA    Seizures     Subdeltoid bursitis, L>R. 9/27/2012    Ulcer     Vitamin D deficiency disease       Past Surgical History:   Procedure Laterality Date    BLADDER SUSPENSION      CATARACT EXTRACTION  11/18/13    left eye    CERVICAL LAMINECTOMY      x3, fusion x1    COLONOSCOPY  2009    HYSTERECTOMY      JOINT REPLACEMENT  2001    total right knee     LUMBAR LAMINECTOMY      x 3, fusion x1       Referring physician: Dr. Rosas  Date referred to PT: 10/18/17    General Precautions: Standard, fall  Orthopedic Precautions: N/A   Braces: N/A       Do you have any cultural, spiritual, L[employer]-day conflicts, given your current situation?: none reported to PT    Patient History:  Living Environment Comment: Pt reports living with her  in a H with 1 BERK[address] through threshold with walk in tub with seat and bars. Pt was independent with mobility and ADLs PTA. Pt reports she has not returned to 100% since her last admission, she is moving slower and gets more SOB.  Equipment Currently Used at Home:  (built in tub seat and grab bar in  shower)  DME owned (not currently used): rolling walker, single point cane and quad cane (belonged to pt's father)    Previous Level of Function:  Ambulation Skills: independent  Transfer Skills: independent  ADL Skills: independent    Subjective:  Communicated with DANIEL Lamb prior to session.  Pt reports she has not recovered fully since her pneumonia, that she becomes SOB with little mobility.  Chief Complaint: SOB with activity  Patient goals: to increase mobility and return to PLOF    Pain/Comfort  Pain Rating 1: 0/10  Pain Rating Post-Intervention 1: 0/10      Objective:   Patient found with: telemetry, bed alarm     Cognitive Exam:  Oriented to: Person, Place, Time and Situation    Follows Commands/attention: Follows multistep  commands  Communication: clear/fluent  Safety awareness/insight to disability: intact    Physical Exam:  Postural examination/scapula alignment: Rounded shoulder and Head forward    Skin integrity: Visible skin intact  Edema: None noted     Sensation:   Intact    Lower Extremity Range of Motion:  Right Lower Extremity: WFL  Left Lower Extremity: WFL    Lower Extremity Strength:  Right Lower Extremity: Deficits: grossly 3+ to 4-/5  Left Lower Extremity: Deficits: grossly 3+ to 4-/5     Fine motor coordination:  Intact    Gross motor coordination: WFL    Functional Mobility:  Bed Mobility:  Rolling/Turning Right: Supervision, With side rail  Scooting/Bridging: Supervision  Supine to Sit: Supervision, With side rail (HOB elevated)  Sit to Supine: Supervision    Transfers:  Sit <> Stand Assistance: Contact Guard Assistance  Sit <> Stand Assistive Device: Rolling Walker, No Assistive Device    Gait:   Gait Distance: ~60 ft no AD with CGA with instability and pt reaching for railing on wall. ~80 ft with RW and SBA/CGA with improved stability-cues for shoulder relaxation.  Assistance 1: Stand by Assistance, Contact Guard Assistance  Gait Assistive Device: No device, Rolling walker  Gait  Pattern: reciprocal  Gait Deviation(s): decreased charito, increased time in double stance, decreased step length, decreased velocity of limb motion, decreased stride length, decreased swing-to-stance ratio    Balance:   Static Sit: FAIR+: Able to take MINIMAL challenges from all directions  Dynamic Sit: FAIR+: Maintains balance through MINIMAL excursions of active trunk motion  Static Stand: FAIR: Maintains without assist but unable to take challenges  Dynamic stand: FAIR: Needs CONTACT GUARD during gait    Therapeutic Activities and Exercises:  PT evaluation completed. 1st bout of gait completed without AD and pt required CGA for stability and UE support on railing on wall for stability. Pt able to tolerate increased gait distance with SBA/CGA with RW and reports less SOB with gait with RW. Pt educated on energy conservation techniques and use of RW for energy conservation and safety and pt verbalizes understanding.    AM-PAC 6 CLICK MOBILITY  How much help from another person does this patient currently need?   1 = Unable, Total/Dependent Assistance  2 = A lot, Maximum/Moderate Assistance  3 = A little, Minimum/Contact Guard/Supervision  4 = None, Modified [place]/Independent    Turning over in bed (including adjusting bedclothes, sheets and blankets)?: 3  Sitting down on and standing up from a chair with arms (e.g., wheelchair, bedside commode, etc.): 3  Moving from lying on back to sitting on the side of the bed?: 3  Moving to and from a bed to a chair (including a wheelchair)?: 3  Need to walk in hospital room?: 3  Climbing 3-5 steps with a railing?: 2  Total Score: 17     AM-PAC Raw Score CMS G-Code Modifier Level of Impairment Assistance   6 % Total / Unable   7 - 9 CM 80 - 100% Maximal Assist   10 - 14 CL 60 - 80% Moderate Assist   15 - 19 CK 40 - 60% Moderate Assist   20 - 22 CJ 20 - 40% Minimal Assist   23 CI 1-20% SBA / CGA   24 CH 0% Independent/ Mod I     Patient left HOB elevated with  call button in reach, bed alarm on and RN notified.    Assessment:   Katie Quevedo is a 72 y.o. female with a medical diagnosis of Orthostatic hypotension and presents with overall weakness, SOB with activity, decreased activity tolerance, impaired gait mechanics, and impaired balance. Recommending use of RW for gait for safety and energy conservation at this time. Recommending d/c home with HH PT/OT    Rehab identified problem list/impairments: Rehab identified problem list/impairments: weakness, impaired endurance, impaired self care skills, impaired functional mobilty, gait instability, impaired balance, decreased lower extremity function, decreased upper extremity function, impaired cardiopulmonary response to activity    Rehab potential is good.    Activity tolerance: Good    Discharge recommendations: Discharge Facility/Level Of Care Needs: home health PT, home health OT     Barriers to discharge: Barriers to Discharge: None    Equipment recommendations: Equipment Needed After Discharge: none     GOALS:    Physical Therapy Goals        Problem: Physical Therapy Goal    Goal Priority Disciplines Outcome Goal Variances Interventions   Physical Therapy Goal     PT/OT, PT Ongoing (interventions implemented as appropriate)     Description:  Goals to be met by: 17     Patient will increase functional independence with mobility by performin. Supine <> sit with Modified [place]  2. Sit to stand transfer with Supervision  3. Bed to chair transfer with Stand-by Assistance using Rolling Walker  4. Gait  x 150 feet with Stand-by Assistance using Rolling Walker.   5. Lower extremity exercise program x 10 reps per handout, with supervision                      PLAN:    Patient to be seen 6 x/week to address the above listed problems via gait training, therapeutic activities, therapeutic exercises  Plan of Care expires: 17  Plan of Care reviewed with: patient    Functional Assessment Tool Used:  AMPAC  Score: 17  Functional Limitation: Mobility: Walking and moving around  Mobility: Walking and Moving Around Current Status (): CK  Mobility: Walking and Moving Around Goal Status (): WESTON Whittaker, PT  10/19/2017

## 2017-10-19 NOTE — PLAN OF CARE
Problem: Patient Care Overview  Goal: Plan of Care Review  Outcome: Ongoing (interventions implemented as appropriate)  Admitted with low blood pressure/syncope.  Received fluids, B/P 136/65.  No complaints of dizziness.  Fall precautions explained and maintained.  Bed alarm on.  Telemetry with NSR 1ST degree AV block.  Will continue to monitor VS, telemetry, labs and medications as ordered.

## 2017-10-19 NOTE — PLAN OF CARE
Problem: Patient Care Overview  Goal: Plan of Care Review  BBS were clear and diminished in bases.  Good aeration in bases with deep breath.  SpO2 was 96% on room air.  Will continue to monitor SpO2 and BBS

## 2017-10-19 NOTE — H&P
[facility] Internal Medicine History and Physical - Resident Note    Admitting Team: [facility] Hospital Medicine Team B  Attending Physician: Dr. Alfred  Resident: Dr. Rosas  Interns: Dr. Baldwin    Date of Admit: 10/18/2017    Chief Complaint     Lightheadedness with standing for 1 week    Subjective:      History of Present Illness:  Katie Quevedo is a 72 y.o.  female who  has a past medical history of Anemia; Cataract; Chronic LBP (7/26/2012); Chronic pain; CKD (chronic kidney disease); COPD (chronic obstructive pulmonary disease); Dehydration; Lumbar spondylosis; Melanoma; Migraines, neuralgic; Osteoporosis; Primary osteoarthritis of both knees; Subdeltoid bursitis, L>R. (9/27/2012); Ulcer; and Vitamin D deficiency disease.. The patient presented to [facility] on 10/18/2017 with a primary complaint of Hypotension (sent to ED by pulmonology with hypotension and shortness of breath)    Patient was at her Pulmonologist's office for a follow up visit for PNA today, when she stood up to walk back to the exam room and felt so lightheaded and weak that she fell. She was caught by others around her, so she did not fall to the ground. She states that she's felt lightheaded with standing for about the past week. Denies CP, nausea, vomiting, LOC, fall to ground, diaphoresis, or hot flash just prior to feeling lightheaded. She denies her vision changing. Also denies recent fevers, chills, diarrhea, dysuria. When she stands up, she reports her heart racing and feeling a little short of breath.    She has some on-going cough, and diffuse chest pain from the persistent coughing, from the PNA that she was treated for at the end of September.    Past Medical History:  Past Medical History:   Diagnosis Date    Anemia     Cataract     Chronic LBP 7/26/2012    Chronic pain     CKD (chronic kidney disease)     COPD (chronic obstructive pulmonary disease)     Dehydration     Lumbar spondylosis     Melanoma     of the  lip    Migraines, neuralgic     Osteoporosis     Primary osteoarthritis of both knees     s/p Rt TKA    Subdeltoid bursitis, L>R. 9/27/2012    Ulcer     Vitamin D deficiency disease        Past Surgical History:  Past Surgical History:   Procedure Laterality Date    BLADDER SUSPENSION      CATARACT EXTRACTION  11/18/13    left eye    CERVICAL LAMINECTOMY      x3, fusion x1    COLONOSCOPY  2009    HYSTERECTOMY      JOINT REPLACEMENT  2001    total right knee     LUMBAR LAMINECTOMY      x 3, fusion x1       Allergies:  Review of patient's allergies indicates:   Allergen Reactions    Aspirin      Other reaction(s): hx of ulcers    Penicillins      Other reaction(s): Hives  Other reaction(s): Rash  Other reaction(s): Rash  Other reaction(s): Hives    Tetracycline      Other reaction(s): Swelling       Home Medications:  Prior to Admission medications    Medication Sig Start Date End Date Taking? Authorizing Provider   fluticasone-salmeterol 250-50 mcg/dose (ADVAIR) 250-50 mcg/dose diskus inhaler Inhale 1 puff into the lungs 2 (two) times daily. 9/24/17  Yes Bridget Nicole MD   ipratropium-albuterol (COMBIVENT)  mcg/actuation inhaler Inhale 1 puff into the lungs every 4 (four) hours as needed for Wheezing. Rescue 9/24/17  Yes Bridget Nicole MD   morphine (MS CONTIN) 15 MG 12 hr tablet Take 1 tablet (15 mg total) by mouth 2 (two) times daily. 9/7/17 10/18/17 Yes Pushpa Mcmahon MD   oxycodone-acetaminophen (PERCOCET)  mg per tablet TAKE 1 TABLET BY MOUTH THREE TIMES A DAY AS NEEDED FOR PAIN 10/14/17  Yes Pushpa Mcmahon MD   paroxetine (PAXIL) 30 MG tablet Take 30 mg by mouth 2 (two) times daily.   Yes Historical Provider, MD   tizanidine (ZANAFLEX) 4 MG tablet TAKE 1-2 TABLETS BY MOUTH THREE TIMES A DAY AS NEEDED 5/2/17  Yes Pushpa Mcmahon MD   trazodone (DESYREL) 50 MG tablet TAKE 1-2 TABLETS BY MOUTH NIGHTLY AS NEEDED FOR INSOMNIA 6/9/17  Yes Pushpa Mcmahon MD  "  cetirizine (ZYRTEC) 10 MG tablet Take 1 tablet (10 mg total) by mouth once daily. 14  Isai De Dios Jr., MD   DIAZEPAM (VALIUM ORAL) Take 1 tablet by mouth Twice daily as needed. 12   Historical Provider, MD   moxifloxacin (AVELOX) 400 mg tablet Take 1 tablet (400 mg total) by mouth once daily. 17   Bridget Nicole MD   tretinoin (RETIN-A) 0.1 % cream Use hs 3/4/16   Betty Méndez MD   triamcinolone acetonide 0.1% (KENALOG) 0.1 % cream Use hs prn itching 3/2/16   Betty Méndez MD       Family History:  Family History   Problem Relation Age of Onset    Arthritis Mother     Stroke Mother     Hypertension Father     Cancer Father     Cataracts Father     Diabetes Maternal Aunt     Hypertension Maternal Grandfather     Heart disease Maternal Grandfather     Cataracts Sister     Glaucoma Cousin     Amblyopia Neg Hx     Blindness Neg Hx     Macular degeneration Neg Hx     Retinal detachment Neg Hx     Strabismus Neg Hx        Social History:  Social History   Substance Use Topics    Smoking status: Former Smoker     Types: Cigarettes    Smokeless tobacco: Former User    Alcohol use Yes      Comment: Rare       Review of Systems:  Pertinent items are noted in HPI. All other systems are reviewed and are negative.     Objective:   Last 24 Hour Vital Signs:  BP  Min: 71/46  Max: 123/64  Temp  Av °F (36.7 °C)  Min: 97.8 °F (36.6 °C)  Max: 98.1 °F (36.7 °C)  Pulse  Av.3  Min: 65  Max: 95  Resp  Av.4  Min: 18  Max: 20  SpO2  Av.3 %  Min: 94 %  Max: 100 %  Height  Av' 2.5" (158.8 cm)  Min: 5' 2.5" (158.8 cm)  Max: 5' 2.5" (158.8 cm)  Weight  Av.4 kg (131 lb)  Min: 59.4 kg (131 lb)  Max: 59.4 kg (131 lb)  Body mass index is 23.58 kg/m².  No intake/output data recorded.    Physical Examination:  /64 (Patient Position: Sitting)   Pulse 95   Temp 97.8 °F (36.6 °C) (Oral)   Resp 20   Ht 5' 2.5" (1.588 m)   Wt 59.4 kg (131 lb)   SpO2 95%   BMI " 23.58 kg/m²   General appearance: alert, appears stated age and cooperative  Head: Normocephalic, without obvious abnormality, atraumatic  Eyes: conjunctivae/corneas clear. PERRL, EOM's intact.  Throat: lips, mucosa, and tongue normal; teeth and gums normal, dry mucous membranes  Chest: pain on palpation of chest on both right and left  Lungs: rales bibasilar and good air movement throughout  Heart: regular rate and rhythm, S1, S2 normal, no murmur, click, rub or gallop  Abdomen: soft, non-tender; bowel sounds normal; no masses,  no organomegaly  Extremities: extremities normal, atraumatic, no cyanosis or edema  Pulses: 1+ and symmetric  Skin: skin color and texture normal, reduction in turgor  Neurologic: Grossly normal      Laboratory:  Most Recent Data:  CBC: Lab Results   Component Value Date    WBC 10.83 10/18/2017    HGB 9.6 (L) 10/18/2017    HCT 30.0 (L) 10/18/2017     (H) 10/18/2017    MCV 95 10/18/2017    RDW 12.9 10/18/2017     BMP: Lab Results   Component Value Date     (L) 10/18/2017    K 4.9 10/18/2017     10/18/2017    CO2 23 10/18/2017    BUN 25 (H) 10/18/2017    CREATININE 2.8 (H) 10/18/2017     (H) 10/18/2017    CALCIUM 9.5 10/18/2017    MG 1.9 10/18/2017    PHOS 3.6 10/18/2017     LFTs: Lab Results   Component Value Date    PROT 7.3 10/18/2017    ALBUMIN 2.9 (L) 10/18/2017    BILITOT 0.2 10/18/2017    AST 13 10/18/2017    ALKPHOS 104 10/18/2017    ALT 7 (L) 10/18/2017     Coags:   Lab Results   Component Value Date    INR 0.9 10/18/2017     FLP: Lab Results   Component Value Date    CHOL 167 09/13/2016    HDL 63 09/13/2016    LDLCALC 75.6 09/13/2016    TRIG 142 09/13/2016    CHOLHDL 37.7 09/13/2016     Thyroid: Lab Results   Component Value Date    TSH 0.750 10/18/2017     Cardiac: Lab Results   Component Value Date    TROPONINI 0.006 10/18/2017    BNP 68 10/18/2017     Trended Lab Data:    Recent Labs  Lab 10/18/17  1658   WBC 10.83   HGB 9.6*   HCT 30.0*   *   MCV  95   RDW 12.9   *   K 4.9      CO2 23   BUN 25*   CREATININE 2.8*   *   PROT 7.3   ALBUMIN 2.9*   BILITOT 0.2   AST 13   ALKPHOS 104   ALT 7*     Trended Cardiac Data:    Recent Labs  Lab 10/18/17  1658   TROPONINI 0.006   BNP 68     Radiology:  Imaging Results          X-Ray Chest AP Portable (Final result)  Result time 10/18/17 16:50:17    Final result by Porter Ramesh MD (10/18/17 16:50:17)                 Impression:      Coarse interstitial attenuation, suggesting chronic interstitial process however there may be developing consolidation projected over the right upper lungs on and possibly right lower lung zone versus residual of previous consolidation, correlation advised.        Electronically signed by: PORTER RAMESH MD  Date:     10/18/17  Time:    16:50              Narrative:    Chest AP portable    Indication:Sepsis    Comparison:9/22/2017    Findings:  The cardiomediastinal silhouette is not enlarged.  There is no pleural effusion.  The trachea is midline.  The lungs are symmetrically expanded bilaterally with coarse interstitial attenuation and scatter regions of band like atelectasis or scarring.  There is increased attenuation projected over the right lower lung zone, improved since the previous exam.  Increased parenchymal attenuation projects over the right upper lung zone, new since the previous exam developing consolidation is a consideration.   There is no pneumothorax.  The osseous structures are remarkable for degenerative changes of the spine and shoulders noting postsurgical changes of the cervical spine.                             Assessment:     Katie Quevedo is a 72 y.o. female with:  Patient Active Problem List    Diagnosis Date Noted    CAP (community acquired pneumonia) 09/23/2017    COPD exacerbation     Hypoxia     Pruritus 02/20/2016    Chronic low back pain 09/25/2015    Osteoporosis 09/21/2015    Osteoarthritis, hip, bilateral 10/27/2014    Lumbar  radiculopathy, BLE 10/27/2014    Cervical radiculopathy, BUE 10/27/2014    COPD (chronic obstructive pulmonary disease) with chronic bronchitis 10/08/2014    UTI symptoms 10/08/2014    CKD (chronic kidney disease)     Biceps tendonitis 01/06/2014    Muscle spasm 12/18/2013    Intractable migraine 12/18/2013    Post-operative state 11/18/2013    Rotator cuff tear, right 10/18/2013    Nuclear sclerosis - Both Eyes 08/08/2013    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 Orthostatic hypotension - Patient's symptoms fit most closely with orthostatic hypotension. She presented to the ED with hypotension to 71/46 while sitting. On exam, orthostatics positive by symptoms after 1L of fluids. Improved to 123/64 with fluids. Sating well on RA. Bedside ultrasound performed and also showed >50% collapse of IVC with inspiration. Patient without significant cardiac history. TNI negative and BNP 68.  - Will continue to hydrate and monitor symptoms.    2. YOSVANY - Creatinine 2.8 on admission, patient's baseline appears to be 1.9. Suspect this is pre-renal from hypovolemia.  - Follow up urine studies  - CMP in AM to trend Creatinine    3. Hyponatremia - 133 on admission. Likely 2/2 poor PO intake. Anticipate correction with fluids.    4. MSK Chest pain - reproducible on exam, likely related to persistent cough. Patient already on a significant pain regimen for chronic back pain. Will continue home medications.    5. COPD - no increased cough or sputum production, no respiratory distress. Not in acute exacerbation.  Will continue home inhalers.    6. Chronic back pain - patient on significant amount of opiates at home for chronic back pain. No acute issues with back pain currently, will continue home medications.    7. Normocytic anemia - Patient appears to be above baseline Hg of ~8.5, suspect this is due to volume contraction given increased total protein as well. Asymptomatic at this point, possible related to poor nutrition and previous malignancy history. Iron studies consistent with AOCD.    FEN: Regular diet, LR @200cc/hr  Ppx: heparin  Dispo: Pending clinical improvement, likely home in next 24 hours    Code Status:     FULL    Ana Baldwin MD  [facility] Internal Medicine/Emergency Medicine HO-I  [facility] Hospitalist Medicine Team B     [facility] Medicine Hospitalist Pager numbers:   [facility] Hospitalist Medicine Team A (Mitzy/Benjamin):          [phone]  [facility] Hospitalist Medicine Team B (Aubrie/Judit):        [phone]

## 2017-10-20 NOTE — PT/OT/SLP DISCHARGE
Occupational Therapy Discharge Summary    Katie Quevedo  MRN: [id number]   Orthostatic hypotension   Patient Discharged from acute Occupational Therapy on 10/19.  Please refer to prior OT note dated on 10/19 for functional status.     Assessment:   Patient was discharge unexpectedly.  Information required to complete and accurate discharge summary is unknown.  Refer to therapy initial evaluation and last progress note for initial and most recent functional status and goal achievement.  Recommendations made may be found in medical record. Patient appropriate for care in another setting.  GOALS:    Occupational Therapy Goals        Problem: Occupational Therapy Goal    Goal Priority Disciplines Outcome Interventions   Occupational Therapy Goal     OT, PT/OT Ongoing (interventions implemented as appropriate)    Description:  Goals to be met by: 11/19/17     Patient will increase functional independence with ADLs by performing:    LE Dressing with Modified [place].  Grooming while standing with Supervision.  Toileting from toilet with Supervision for hygiene and clothing management.   Supine to sit with Supervision.  Stand pivot transfers with Supervision.  Toilet transfer to toilet with Supervision.  Increased functional strength to WFL for self care skills and functional mobility.  Upper extremity exercise program x10 reps per handout, with independence.                    Reasons for Discontinuation of Therapy Services  Transfer to alternate level of care.      Plan:  Patient Discharged to: Home with Home Health Service.

## 2017-10-23 ENCOUNTER — TELEPHONE (OUTPATIENT)
Dept: ADMINISTRATIVE | Facility: CLINIC | Age: 72
End: 2017-10-23

## 2017-10-23 LAB
BACTERIA BLD CULT: NORMAL
BACTERIA BLD CULT: NORMAL

## 2017-10-31 ENCOUNTER — TELEPHONE (OUTPATIENT)
Dept: FAMILY MEDICINE | Facility: CLINIC | Age: 72
End: 2017-10-31

## 2017-10-31 NOTE — TELEPHONE ENCOUNTER
----- Message from Meera Whittaker sent at 10/31/2017  3:14 PM CDT -----  Contact: Manish lozano/ [facility] [phone]  Manish requesting to speak with you because patient's heart rate on exertion is 140-150 and at rest it is normal. Please call and advise.

## 2017-11-02 ENCOUNTER — TELEPHONE (OUTPATIENT)
Dept: FAMILY MEDICINE | Facility: CLINIC | Age: 72
End: 2017-11-02

## 2017-11-02 ENCOUNTER — HOSPITAL ENCOUNTER (EMERGENCY)
Facility: HOSPITAL | Age: 72
Discharge: HOME | End: 2017-11-02
Attending: EMERGENCY MEDICINE
Payer: MEDICARE

## 2017-11-02 VITALS
OXYGEN SATURATION: 95 % | BODY MASS INDEX: 24.11 KG/M2 | TEMPERATURE: 98 F | DIASTOLIC BLOOD PRESSURE: 53 MMHG | WEIGHT: 131 LBS | HEIGHT: 62 IN | SYSTOLIC BLOOD PRESSURE: 105 MMHG | RESPIRATION RATE: 16 BRPM | HEART RATE: 82 BPM

## 2017-11-02 DIAGNOSIS — Z87.898 HISTORY OF PALPITATIONS: Primary | ICD-10-CM

## 2017-11-02 LAB
ALBUMIN SERPL BCP-MCNC: 3.5 G/DL
ALP SERPL-CCNC: 85 U/L
ALT SERPL W/O P-5'-P-CCNC: 10 U/L
ANION GAP SERPL CALC-SCNC: 7 MMOL/L
AST SERPL-CCNC: 17 U/L
BASOPHILS # BLD AUTO: 0.07 K/UL
BASOPHILS NFR BLD: 0.8 %
BILIRUB SERPL-MCNC: 0.3 MG/DL
BNP SERPL-MCNC: 30 PG/ML
BUN SERPL-MCNC: 24 MG/DL
CALCIUM SERPL-MCNC: 9.2 MG/DL
CHLORIDE SERPL-SCNC: 105 MMOL/L
CO2 SERPL-SCNC: 25 MMOL/L
CREAT SERPL-MCNC: 1.8 MG/DL
DIFFERENTIAL METHOD: ABNORMAL
EOSINOPHIL # BLD AUTO: 1.4 K/UL
EOSINOPHIL NFR BLD: 16 %
ERYTHROCYTE [DISTWIDTH] IN BLOOD BY AUTOMATED COUNT: 13.1 %
EST. GFR  (AFRICAN AMERICAN): 32 ML/MIN/1.73 M^2
EST. GFR  (NON AFRICAN AMERICAN): 27 ML/MIN/1.73 M^2
GLUCOSE SERPL-MCNC: 99 MG/DL
HCT VFR BLD AUTO: 34.6 %
HGB BLD-MCNC: 10.9 G/DL
LYMPHOCYTES # BLD AUTO: 2.2 K/UL
LYMPHOCYTES NFR BLD: 25.9 %
MCH RBC QN AUTO: 29.7 PG
MCHC RBC AUTO-ENTMCNC: 31.5 G/DL
MCV RBC AUTO: 94 FL
MONOCYTES # BLD AUTO: 0.7 K/UL
MONOCYTES NFR BLD: 8.3 %
NEUTROPHILS # BLD AUTO: 4.2 K/UL
NEUTROPHILS NFR BLD: 48.9 %
PLATELET # BLD AUTO: 342 K/UL
PMV BLD AUTO: 9.5 FL
POTASSIUM SERPL-SCNC: 4.9 MMOL/L
PROT SERPL-MCNC: 7.3 G/DL
RBC # BLD AUTO: 3.67 M/UL
SODIUM SERPL-SCNC: 137 MMOL/L
TROPONIN I SERPL DL<=0.01 NG/ML-MCNC: 0.01 NG/ML
WBC # BLD AUTO: 8.56 K/UL

## 2017-11-02 PROCEDURE — 99284 EMERGENCY DEPT VISIT MOD MDM: CPT

## 2017-11-02 PROCEDURE — 93005 ELECTROCARDIOGRAM TRACING: CPT

## 2017-11-02 PROCEDURE — 83880 ASSAY OF NATRIURETIC PEPTIDE: CPT

## 2017-11-02 PROCEDURE — 84484 ASSAY OF TROPONIN QUANT: CPT

## 2017-11-02 PROCEDURE — 80053 COMPREHEN METABOLIC PANEL: CPT

## 2017-11-02 PROCEDURE — 85025 COMPLETE CBC W/AUTO DIFF WBC: CPT

## 2017-11-02 NOTE — TELEPHONE ENCOUNTER
Spoke with the patients nurse Niki and informed her of Dr. Martínez's recommendations that the patient needs to be evaluated in the ER. She verbalized understanding and stated she will notify the patient's .

## 2017-11-02 NOTE — TELEPHONE ENCOUNTER
----- Message from Katharine Hein sent at 11/2/2017  2:44 PM CDT -----  [phone]  Calling to talk to nurse concerning patients heart rate readings are 140. Please advice

## 2017-11-02 NOTE — ED NOTES
72 year old female presents to ed cc of palpitations/ sob x 1 week. Patient states palpitations and dyspnea on exertion

## 2017-11-02 NOTE — ED NOTES
APPEARANCE: Alert, oriented and in no acute distress.  PERIPHERAL VASCULAR: peripheral pulses present. Normal cap refill. No edema. Warm to touch.    GASTRO: soft, bowel sounds normal, no tenderness, no abdominal distention.  MUSC: Full ROM. No bony tenderness or soft tissue tenderness. No obvious deformity.  SKIN: Skin is warm and dry, normal skin turgor, mucous membranes moist.  NEURO: 5/5 strength major flexors/extensors bilaterally. Sensory intact to light touch bilaterally. [place] coma scale: eyes open spontaneously-4, oriented & converses-5, obeys commands-6. No neurological abnormalities.   MENTAL STATUS: awake, alert and aware of environment.  EYE: PERRL, both eyes: pupils brisk and reactive to light. Normal size.  ENT: EARS: no obvious drainage. NOSE: no active bleeding.

## 2017-11-02 NOTE — TELEPHONE ENCOUNTER
Returned Harriett's call. She stated the patients heart rate has been elevated on minimal exertion (moving from bathroom to bedroom). Her 02 stats are normal and BP is normal. She is also easily fatigued. This has been going on for about a week.  Her heart rate has been running in the 140's. She is now scheduled this afternoon for an urgent visit with Dr. Martínez.

## 2017-11-03 DIAGNOSIS — M16.0 PRIMARY OSTEOARTHRITIS OF BOTH HIPS: ICD-10-CM

## 2017-11-03 DIAGNOSIS — M54.41 CHRONIC MIDLINE LOW BACK PAIN WITH RIGHT-SIDED SCIATICA: ICD-10-CM

## 2017-11-03 DIAGNOSIS — G89.29 CHRONIC MIDLINE LOW BACK PAIN WITH RIGHT-SIDED SCIATICA: ICD-10-CM

## 2017-11-03 DIAGNOSIS — M17.0 PRIMARY OSTEOARTHRITIS OF BOTH KNEES: ICD-10-CM

## 2017-11-03 DIAGNOSIS — M54.2 CHRONIC NECK PAIN: ICD-10-CM

## 2017-11-03 DIAGNOSIS — G89.29 CHRONIC NECK PAIN: ICD-10-CM

## 2017-11-03 RX ORDER — MORPHINE SULFATE 15 MG/1
15 TABLET, FILM COATED, EXTENDED RELEASE ORAL 2 TIMES DAILY
Qty: 60 TABLET | Refills: 0 | Status: SHIPPED | OUTPATIENT
Start: 2017-11-03 | End: 2017-12-08 | Stop reason: SDUPTHER

## 2017-11-03 NOTE — DISCHARGE INSTRUCTIONS
1) PLEASE FOLLOW UP WITH YOUR CARDIOLOGIST ON Monday AT MAIN CAMPUS FOR FURTHER EVALUATION OF YOUR PALPATIONS  2) PLEASE TAKE YOUR MEDICATIONS AS PRESCRIBED  3) RETURN TO THE ED FOR WORSENING SYMPTOMS, OR IF YOU FEEL SHORT OF BREATH OR HAVE PAIN

## 2017-11-03 NOTE — ED PROVIDER NOTES
Encounter Date: 11/2/2017       History     Chief Complaint   Patient presents with    Palpitations     palpitations with shortness of breath for one week.     72-year-old female presents emergency Department with episodes of palpitations and feeling like her heart rate is elevated when she gets up and walks around.  This is been going on for the last few days.  She notices it when she stands up or when she gets up to go to the bathroom.  She denies associated chest pain or shortness of breath when it occurs.  She denies passing out.  She denies headaches or lightheadedness, nausea or vomiting.    She was recently admitted with a pneumonia.       The history is provided by the patient.   Palpitations    This is a new problem. The current episode started several days ago. The problem occurs intermittently. The problem has been unchanged. The problem is associated with exercise. Pertinent negatives include no diaphoresis, no fever, no malaise/fatigue, no numbness, no chest pain, no chest pressure and no exertional chest pressure. She has tried nothing for the symptoms. The treatment provided no relief.     Review of patient's allergies indicates:   Allergen Reactions    Aspirin      Other reaction(s): hx of ulcers    Penicillins      Other reaction(s): Hives  Other reaction(s): Rash  Other reaction(s): Rash  Other reaction(s): Hives    Tetracycline      Other reaction(s): Swelling     Past Medical History:   Diagnosis Date    Anemia     Cataract     Chronic LBP 7/26/2012    Chronic pain     CKD (chronic kidney disease)     COPD (chronic obstructive pulmonary disease)     Dehydration     Encounter for blood transfusion     Lumbar spondylosis     Melanoma     of the lip    Migraines, neuralgic     Osteoporosis     Primary osteoarthritis of both knees     s/p Rt TKA    Seizures     Subdeltoid bursitis, L>R. 9/27/2012    Ulcer     Vitamin D deficiency disease      Past Surgical History:   Procedure  Laterality Date    BLADDER SUSPENSION      CATARACT EXTRACTION  11/18/13    left eye    CERVICAL LAMINECTOMY      x3, fusion x1    COLONOSCOPY  2009    HYSTERECTOMY      JOINT REPLACEMENT  2001    total right knee     LUMBAR LAMINECTOMY      x 3, fusion x1     Family History   Problem Relation Age of Onset    Arthritis Mother     Stroke Mother     Hypertension Father     Cancer Father     Cataracts Father     Diabetes Maternal Aunt     Hypertension Maternal Grandfather     Heart disease Maternal Grandfather     Cataracts Sister     Glaucoma Cousin     Amblyopia Neg Hx     Blindness Neg Hx     Macular degeneration Neg Hx     Retinal detachment Neg Hx     Strabismus Neg Hx      Social History   Substance Use Topics    Smoking status: Former Smoker     Types: Cigarettes    Smokeless tobacco: Never Used    Alcohol use Yes      Comment: Rare     Review of Systems   Constitutional: Negative for diaphoresis, fever and malaise/fatigue.   Eyes: Negative.    Cardiovascular: Positive for palpitations. Negative for chest pain.   Endocrine: Negative.    Neurological: Negative for numbness.   All other systems reviewed and are negative.      Physical Exam     Initial Vitals [11/02/17 1812]   BP Pulse Resp Temp SpO2   (!) 180/81 (!) 123 18 97.7 °F (36.5 °C) 96 %      MAP       114         Physical Exam    Nursing note and vitals reviewed.  Constitutional: She appears well-developed and well-nourished. She appears distressed.   Thin and frail   HENT:   Head: Normocephalic and atraumatic.   Eyes: EOM are normal. Pupils are equal, round, and reactive to light.   Neck: Normal range of motion. Neck supple.   Cardiovascular: Normal rate.   She was tachycardic at triage, but here has HR 74 and regular   Pulmonary/Chest: No respiratory distress.   Abdominal: Soft.   Musculoskeletal: Normal range of motion.   Neurological: She is alert and oriented to person, place, and time. She has normal strength. No cranial  nerve deficit.   Skin: Skin is warm and dry.   Scar over posterior neck   Psychiatric: She has a normal mood and affect.         ED Course   Procedures  Labs Reviewed   CBC W/ AUTO DIFFERENTIAL - Abnormal; Notable for the following:        Result Value    RBC 3.67 (*)     Hemoglobin 10.9 (*)     Hematocrit 34.6 (*)     MCHC 31.5 (*)     Eos # 1.4 (*)     Eosinophil% 16.0 (*)     All other components within normal limits   COMPREHENSIVE METABOLIC PANEL - Abnormal; Notable for the following:     BUN, Bld 24 (*)     Creatinine 1.8 (*)     Anion Gap 7 (*)     eGFR if  32 (*)     eGFR if non  27 (*)     All other components within normal limits   TROPONIN I   B-TYPE NATRIURETIC PEPTIDE     EKG Readings: (Independently Interpreted)   Initial Reading: No STEMI. Rhythm: Normal Sinus Rhythm. Heart Rate: 93. Ectopy: No Ectopy. Conduction: Normal. ST Segments: Normal ST Segments. T Waves: Normal. Axis: Normal.          Medical Decision Making:   Initial Assessment:   72-year-old female presents emergency Department with episodes of palpitations when she gets up and walks.  She has no shortness of breath no tachypnea.  She was recently admitted with a pneumonia.    Her vital signs on arrival, heart rate 123 however not captured on EKG.  She has normal sinus rhythm on EKG, as well as on my exam her heart rate is regular  Differential Diagnosis:   DDX: pneumonia, dehydration, adrenergic surg, anemia, renal failure- she has no dyspnea, leg pain or swelling and has no drop in her Oxygen sats when this occurs    Patient was given IVF, her  had made her an appt with a cardiologist at [facility] at 11AM on Monday- though he's not sure which cardiologist. She had no symptoms while she was here.     Patient was counseled on reasons to return to the ED and expressed understanding, patient was discharged in stable condition with appropriate outpatient follow up plan.     Clinical Tests:   Lab Tests:  Ordered and Reviewed  Radiological Study: Ordered and Reviewed                   ED Course as of Nov 03 0109   Thu Nov 02, 2017   1921 Improved Cr  [MG]      ED Course User Index  [MG] Gregoria Payan MD     Clinical Impression:   The encounter diagnosis was History of palpitations.                           Gregoria Payan MD  11/03/17 0109

## 2017-11-03 NOTE — ED NOTES
Report received from DANIEL Polanco. Pt resting in bed. Pt aaox4. rr even and unlabored on ra. Nadn. Pt denies cp/sob at current time. Pt HR 75 on monitor, O2 96% on ra. Call bell within reach. Visitor at bedside. Will continue to monitor.

## 2017-11-06 ENCOUNTER — HOSPITAL ENCOUNTER (OUTPATIENT)
Dept: CARDIOLOGY | Facility: HOSPITAL | Age: 72
Discharge: HOME | End: 2017-11-06
Attending: INTERNAL MEDICINE
Payer: MEDICARE

## 2017-11-06 ENCOUNTER — OFFICE VISIT (OUTPATIENT)
Dept: CARDIOLOGY | Facility: CLINIC | Age: 72
End: 2017-11-06
Payer: MEDICARE

## 2017-11-06 VITALS
DIASTOLIC BLOOD PRESSURE: 76 MMHG | WEIGHT: 130.75 LBS | HEART RATE: 97 BPM | OXYGEN SATURATION: 95 % | SYSTOLIC BLOOD PRESSURE: 155 MMHG | HEIGHT: 62 IN | BODY MASS INDEX: 24.06 KG/M2

## 2017-11-06 DIAGNOSIS — R00.0 TACHYCARDIA: Primary | ICD-10-CM

## 2017-11-06 DIAGNOSIS — N18.30 KIDNEY DISEASE, CHRONIC, STAGE III (GFR 30-59 ML/MIN): ICD-10-CM

## 2017-11-06 DIAGNOSIS — J44.89 COPD (CHRONIC OBSTRUCTIVE PULMONARY DISEASE) WITH CHRONIC BRONCHITIS: ICD-10-CM

## 2017-11-06 PROCEDURE — 99204 OFFICE O/P NEW MOD 45 MIN: CPT | Mod: S$GLB,,, | Performed by: INTERNAL MEDICINE

## 2017-11-06 PROCEDURE — 93227 XTRNL ECG REC<48 HR R&I: CPT | Mod: ,,, | Performed by: INTERNAL MEDICINE

## 2017-11-06 PROCEDURE — 99999 PR PBB SHADOW E&M-EST. PATIENT-LVL III: CPT | Mod: PBBFAC,,, | Performed by: INTERNAL MEDICINE

## 2017-11-06 PROCEDURE — 99499 UNLISTED E&M SERVICE: CPT | Mod: S$GLB,,, | Performed by: INTERNAL MEDICINE

## 2017-11-06 PROCEDURE — 93226 XTRNL ECG REC<48 HR SCAN A/R: CPT

## 2017-11-06 NOTE — PROGRESS NOTES
Subjective:    Patient ID:  Katie Quevedo is a 72 y.o. female who presents for evaluation of tachycardia    HPI     72-year-old female presented to Emergency Department 11/3/17 with episodes of palpitations and feeling like her heart rate is elevated when she gets up and walks around.  It had been going on for the last few days.  She notices it when she stands up or when she gets up to go to the bathroom.  She denies associated chest pain or shortness of breath when it occurs.  She denies passing out.  She denies headaches or lightheadedness, nausea or vomiting. She continues with the the increase in heart rate with minimal activity. She denies chest pain or SOB . Recent EKGs have been normal and was BNP. Her TSH is noted to go to 0.75 from 1.4 over the past year. She  Had an admit 9/25 for COPD and 10/22 for orthostatic hypotension.  Lab Results   Component Value Date     11/02/2017    K 4.9 11/02/2017     11/02/2017    CO2 25 11/02/2017    BUN 24 (H) 11/02/2017    CREATININE 1.8 (H) 11/02/2017    GLU 99 11/02/2017    HGBA1C 4.7 05/14/2012    MG 1.9 10/18/2017    AST 17 11/02/2017    ALT 10 11/02/2017    ALBUMIN 3.5 11/02/2017    PROT 7.3 11/02/2017    BILITOT 0.3 11/02/2017    WBC 8.56 11/02/2017    HGB 10.9 (L) 11/02/2017    HCT 34.6 (L) 11/02/2017    MCV 94 11/02/2017     11/02/2017    INR 0.9 10/18/2017    TSH 0.750 10/18/2017         Lab Results   Component Value Date    CHOL 167 09/13/2016    HDL 63 09/13/2016    TRIG 142 09/13/2016       Lab Results   Component Value Date    LDLCALC 75.6 09/13/2016       Past Medical History:   Diagnosis Date    Anemia     Cataract     Chronic LBP 7/26/2012    Chronic pain     CKD (chronic kidney disease)     COPD (chronic obstructive pulmonary disease)     Dehydration     Encounter for blood transfusion     Lumbar spondylosis     Melanoma     of the lip    Migraines, neuralgic     Osteoporosis     Primary osteoarthritis of both knees     s/p  Rt TKA    Seizures     Subdeltoid bursitis, L>R. 9/27/2012    Ulcer     Vitamin D deficiency disease        Current Outpatient Prescriptions:     fluticasone (FLONASE) 50 mcg/actuation nasal spray, 1 spray by Each Nare route once daily., Disp: 1 Bottle, Rfl: 0    fluticasone-salmeterol 250-50 mcg/dose (ADVAIR) 250-50 mcg/dose diskus inhaler, Inhale 1 puff into the lungs 2 (two) times daily., Disp: 60 each, Rfl: 3    ipratropium-albuterol (COMBIVENT)  mcg/actuation inhaler, Inhale 1 puff into the lungs every 4 (four) hours as needed for Wheezing. Rescue, Disp: 1 Package, Rfl: 3    morphine (MS CONTIN) 15 MG 12 hr tablet, Take 1 tablet (15 mg total) by mouth 2 (two) times daily., Disp: 60 tablet, Rfl: 0    oxycodone-acetaminophen (PERCOCET)  mg per tablet, TAKE 1 TABLET BY MOUTH THREE TIMES A DAY AS NEEDED FOR PAIN, Disp: 90 tablet, Rfl: 0    paroxetine (PAXIL) 30 MG tablet, Take 30 mg by mouth 2 (two) times daily., Disp: , Rfl:     tizanidine (ZANAFLEX) 4 MG tablet, TAKE 1-2 TABLETS BY MOUTH THREE TIMES A DAY AS NEEDED, Disp: 120 tablet, Rfl: 1    trazodone (DESYREL) 50 MG tablet, TAKE 1-2 TABLETS BY MOUTH NIGHTLY AS NEEDED FOR INSOMNIA, Disp: 60 tablet, Rfl: 3    triamcinolone acetonide 0.1% (KENALOG) 0.1 % cream, Use hs prn itching, Disp: 454 g, Rfl: 3        Review of Systems   Constitution: Negative for decreased appetite, diaphoresis, fever, weakness, malaise/fatigue, weight gain and weight loss.   HENT: Negative for congestion, ear discharge, ear pain and nosebleeds.    Eyes: Negative for blurred vision, double vision and visual disturbance.   Cardiovascular: Positive for palpitations. Negative for chest pain, claudication, cyanosis, dyspnea on exertion, irregular heartbeat, leg swelling, near-syncope, orthopnea, paroxysmal nocturnal dyspnea and syncope.   Respiratory: Negative for cough, hemoptysis, shortness of breath, sleep disturbances due to breathing, snoring, sputum production  "and wheezing.    Endocrine: Negative for polydipsia, polyphagia and polyuria.   Hematologic/Lymphatic: Negative for adenopathy and bleeding problem. Does not bruise/bleed easily.   Skin: Negative for color change, nail changes, poor wound healing and rash.   Musculoskeletal: Negative for muscle cramps and muscle weakness.   Gastrointestinal: Negative for abdominal pain, anorexia, change in bowel habit, hematochezia, nausea and vomiting.   Genitourinary: Negative for dysuria, frequency and hematuria.   Neurological: Negative for brief paralysis, difficulty with concentration, excessive daytime sleepiness, dizziness, focal weakness, headaches, light-headedness, seizures and vertigo.   Psychiatric/Behavioral: Negative for altered mental status and depression.   Allergic/Immunologic: Negative for persistent infections.        Objective:BP (!) 155/76 (BP Location: Left arm, Patient Position: Sitting, BP Method: Medium (Automatic))   Pulse 97   Ht 5' 2" (1.575 m)   Wt 59.3 kg (130 lb 11.7 oz)   SpO2 95%   BMI 23.91 kg/m²           Physical Exam   Constitutional: She is oriented to person, place, and time. She appears well-developed and well-nourished.   HENT:   Head: Normocephalic.   Right Ear: External ear normal.   Left Ear: External ear normal.   Nose: Nose normal.   Inspection of lips, teeth and gums normal   Eyes: Conjunctivae and EOM are normal. Pupils are equal, round, and reactive to light. No scleral icterus.   Neck: Normal range of motion. No JVD present. No tracheal deviation present. No thyromegaly present.   Cardiovascular: Normal rate, regular rhythm, normal heart sounds and intact distal pulses.  Exam reveals no gallop and no friction rub.    No murmur heard.  Pulses:       Posterior tibial pulses are 2+ on the right side, and 2+ on the left side.   Pulmonary/Chest: Effort normal and breath sounds normal. No respiratory distress. She has no wheezes. She has no rales. She exhibits no tenderness. "   Abdominal: Soft. Bowel sounds are normal. She exhibits no distension. There is no hepatosplenomegaly. There is no tenderness. There is no guarding.   Musculoskeletal: Normal range of motion. She exhibits no edema or tenderness.   Lymphadenopathy:   Palpation of lymph nodes of neck and groin normal   Neurological: She is oriented to person, place, and time. No cranial nerve deficit. She exhibits normal muscle tone. Coordination normal.   Skin: Skin is warm and dry. No rash noted. No erythema. No pallor.   Palpation of skin normal   Psychiatric: She has a normal mood and affect. Her behavior is normal. Judgment and thought content normal.         Assessment:       1. Tachycardia    2. COPD (chronic obstructive pulmonary disease) with chronic bronchitis    3. Kidney disease, chronic, stage III (GFR 30-59 ml/min)         Plan:       Katie was seen today for tachycardia, shortness of breath, dizziness and palpitations.    Diagnoses and all orders for this visit:    Tachycardia  -     T4, free; Future; Expected date: 11/20/2017  -     Holter monitor - 24 hour    COPD (chronic obstructive pulmonary disease) with chronic bronchitis    Kidney disease, chronic, stage III (GFR 30-59 ml/min)

## 2017-11-15 DIAGNOSIS — G89.29 CHRONIC NECK PAIN: ICD-10-CM

## 2017-11-15 DIAGNOSIS — M16.0 PRIMARY OSTEOARTHRITIS OF BOTH HIPS: ICD-10-CM

## 2017-11-15 DIAGNOSIS — M54.41 CHRONIC MIDLINE LOW BACK PAIN WITH RIGHT-SIDED SCIATICA: ICD-10-CM

## 2017-11-15 DIAGNOSIS — M54.2 CHRONIC NECK PAIN: ICD-10-CM

## 2017-11-15 DIAGNOSIS — G89.29 CHRONIC MIDLINE LOW BACK PAIN WITH RIGHT-SIDED SCIATICA: ICD-10-CM

## 2017-11-15 DIAGNOSIS — M17.0 PRIMARY OSTEOARTHRITIS OF BOTH KNEES: ICD-10-CM

## 2017-11-15 RX ORDER — OXYCODONE AND ACETAMINOPHEN 10; 325 MG/1; MG/1
1 TABLET ORAL 3 TIMES DAILY PRN
Qty: 90 TABLET | Refills: 0 | Status: SHIPPED | OUTPATIENT
Start: 2017-11-15 | End: 2017-12-08 | Stop reason: SDUPTHER

## 2017-11-24 ENCOUNTER — TELEPHONE (OUTPATIENT)
Dept: FAMILY MEDICINE | Facility: CLINIC | Age: 72
End: 2017-11-24

## 2017-12-08 ENCOUNTER — OFFICE VISIT (OUTPATIENT)
Dept: PHYSICAL MEDICINE AND REHAB | Facility: CLINIC | Age: 72
End: 2017-12-08
Payer: MEDICARE

## 2017-12-08 VITALS
BODY MASS INDEX: 24.25 KG/M2 | HEART RATE: 101 BPM | SYSTOLIC BLOOD PRESSURE: 121 MMHG | WEIGHT: 132.63 LBS | DIASTOLIC BLOOD PRESSURE: 71 MMHG

## 2017-12-08 DIAGNOSIS — M17.0 PRIMARY OSTEOARTHRITIS OF BOTH KNEES: ICD-10-CM

## 2017-12-08 DIAGNOSIS — M47.26 OSTEOARTHRITIS OF SPINE WITH RADICULOPATHY, LUMBAR REGION: ICD-10-CM

## 2017-12-08 DIAGNOSIS — G89.29 CHRONIC MIDLINE LOW BACK PAIN WITH RIGHT-SIDED SCIATICA: Primary | ICD-10-CM

## 2017-12-08 DIAGNOSIS — M96.1 CERVICAL POST-LAMINECTOMY SYNDROME: ICD-10-CM

## 2017-12-08 DIAGNOSIS — M75.51 SUBDELTOID BURSITIS, RIGHT: ICD-10-CM

## 2017-12-08 DIAGNOSIS — M96.1 LUMBAR POSTLAMINECTOMY SYNDROME: ICD-10-CM

## 2017-12-08 DIAGNOSIS — G89.29 CHRONIC NECK PAIN: ICD-10-CM

## 2017-12-08 DIAGNOSIS — M16.0 PRIMARY OSTEOARTHRITIS OF BOTH HIPS: ICD-10-CM

## 2017-12-08 DIAGNOSIS — M54.2 CHRONIC NECK PAIN: ICD-10-CM

## 2017-12-08 DIAGNOSIS — M54.41 CHRONIC MIDLINE LOW BACK PAIN WITH RIGHT-SIDED SCIATICA: Primary | ICD-10-CM

## 2017-12-08 PROCEDURE — 99999 PR PBB SHADOW E&M-EST. PATIENT-LVL III: CPT | Mod: PBBFAC,,, | Performed by: PHYSICAL MEDICINE & REHABILITATION

## 2017-12-08 PROCEDURE — 99214 OFFICE O/P EST MOD 30 MIN: CPT | Mod: S$GLB,,, | Performed by: PHYSICAL MEDICINE & REHABILITATION

## 2017-12-08 RX ORDER — MORPHINE SULFATE 15 MG/1
15 TABLET, FILM COATED, EXTENDED RELEASE ORAL 2 TIMES DAILY
Qty: 60 TABLET | Refills: 0 | Status: ON HOLD | OUTPATIENT
Start: 2017-12-08 | End: 2017-12-14 | Stop reason: HOSPADM

## 2017-12-08 RX ORDER — TIZANIDINE 4 MG/1
TABLET ORAL
Qty: 120 TABLET | Refills: 1 | Status: SHIPPED | OUTPATIENT
Start: 2017-12-08 | End: 2018-04-04 | Stop reason: SDUPTHER

## 2017-12-08 RX ORDER — OXYCODONE AND ACETAMINOPHEN 10; 325 MG/1; MG/1
1 TABLET ORAL 3 TIMES DAILY PRN
Qty: 90 TABLET | Refills: 0 | Status: ON HOLD | OUTPATIENT
Start: 2017-12-15 | End: 2017-12-14 | Stop reason: HOSPADM

## 2017-12-08 NOTE — PROGRESS NOTES
Subjective:       Patient ID: Katie Quevedo is a 72 y.o. female.    Chief Complaint: No chief complaint on file.    HPI    HISTORY OF PRESENT ILLNESS:  Mrs. Quevedo is a 72-year-old white female who is   followed up in the [facility] for chronic low back pain with   lumbar radiculopathy, lumbar post-laminectomy syndrome, chronic neck pain with   cervical radiculopathy, post-cervical laminectomy syndrome and recurrent right   subdeltoid bursitis.  Her last visit was on 09/07/2017.  She was maintained on   MS Contin, p.r.n. oxycodone/APAP, p.r.n. trazodone and p.r.n. tizanidine.  She   was on Cymbalta, but was discontinued secondary to excess sedation.    Today, she is coming to clinic for followup.  Her back pain has been stable with   some exacerbation due to the cold weather.  It is a constant aching pain in the   lumbar spine.  The pain radiates to the right foot with numbness.  Her pain is   worse with activity and better with rest.  Her maximum pain is 10/10 and minimum   6/10.  Today, it is 6/10.  The patient complains of right lower extremity   weakness, but without change from her baseline.  She denies any bowel or bladder   incontinence.    Her neck pain has been stable with occasional sebaceous.  It is an almost   constant tightness in the whole cervical spine.  She has occasional shooting   pain to the right hand with tingling.  Her pain is worse with excess neck   movement.  Her maximum pain is 10/10 and minimum 3/10.  Today, it is 3/10.  The   patient complains of mild right upper extremity weakness, but without change   from baseline.  Her right shoulder pain has been doing better recently.    She is currently taking MS Contin 15 mg p.o. twice per day.  She takes   oxycodone/APAP 10/325 p.r.n., usually three times per day.  She use tizanidine 4   mg p.r.n., usually twice per day for help with muscle spasms.  She takes   trazodone intermittently to help with sleep.      MS/IN  dd: 12/08/2017  11:17:34 (CST)  td: 12/09/2017 08:55:37 (CST)  Doc ID   #[id number]  Job ID #[id number]    CC:           Review of Systems   Constitutional: Positive for fatigue.   Eyes: Negative for visual disturbance.   Respiratory: Negative for shortness of breath.    Cardiovascular: Negative for chest pain.   Gastrointestinal: Negative for constipation, nausea and vomiting.   Genitourinary: Negative for difficulty urinating.   Musculoskeletal: Positive for back pain and neck pain. Negative for gait problem.   Neurological: Negative for dizziness and headaches.   Psychiatric/Behavioral: Positive for sleep disturbance. Negative for behavioral problems.       Objective:      Physical Exam   Constitutional: She is oriented to person, place, and time. She appears well-developed and well-nourished.   Neck:   Decreased ROM.  Mild tenderness.   Musculoskeletal:   BUE:  ROM: decreased at shoulder abduction, Rt worse than Lt.  +ve Heberden's & Nilsa's nodes.  Strength:    RUE: 3+/5 at shoulder abduction, 5- elbow flexion, elbow extension, hand .   LUE: 4/5 at shoulder abduction, 5- elbow flexion, elbow extension, hand .  Sensation to pinprick:   RUE: mild decrease.   LUE: intact.    Impingement Signs:  Neer:  RUE: +ve    LUE: -ve  Elwis: RUE: +ve    LUE: -ve     BLE:  ROM:full.  +ve bilateral knee crepitus.   Strength:      RLE: 5/5 at hip flexion, knee extension, ankle DF/PF, EHL.     LLE:  5/5 at hip flexion, knee extension, ankle DF/PF, EHL.  Sensation to pinprick:     RLE: mild decrease.      LLE: mild decrease.   +ve tenderness over lumbar spine.     Neurological: She is alert and oriented to person, place, and time.   Psychiatric: She has a normal mood and affect.   Vitals reviewed.        Assessment:       1. Chronic midline low back pain with right-sided sciatica    2. Osteoarthritis of spine with radiculopathy, lumbar region    3. Lumbar postlaminectomy syndrome    4. Chronic neck pain    5. Cervical post-laminectomy  syndrome    6. Primary osteoarthritis of both knees    7. Primary osteoarthritis of both hips    8. Subdeltoid bursitis, right        Plan:       - Continue morphine (MS CONTIN) 15 MG 12 hr tablet; Take 1 tablet (15 mg total) by mouth 2 (two) times daily.  - Continue oxycodone-acetaminophen (PERCOCET)  mg per tablet; Take 1 tablet by mouth 3 (three) times daily as needed for Pain.  - Continue trazodone (DESYREL) 50 MG tablet; Take 1-2 tablets ( mg total) by mouth nightly as needed for Insomnia.  - Continue tizanidine (ZANAFLEX) 4 MG tablet; Take 1-2 tablets (4-8 mg total) by mouth 3 (three) times daily as needed.  - MRI of lumbar spine and referral to pain Clinic for AJAY were recommended, but the patient wants 'to think about it'.  - Regular home exercise program was encouraged.  - Return in about 4 months (around 4/8/2018).      This was a 25 minute visit, more than 50% of which was spent counseling the patient about the diagnosis and the treatment plan.

## 2017-12-13 ENCOUNTER — HOSPITAL ENCOUNTER (OUTPATIENT)
Dept: RADIOLOGY | Facility: HOSPITAL | Age: 72
Discharge: HOME | DRG: 176 | End: 2017-12-13
Attending: FAMILY MEDICINE
Payer: MEDICARE

## 2017-12-13 ENCOUNTER — HOSPITAL ENCOUNTER (INPATIENT)
Facility: HOSPITAL | Age: 72
LOS: 1 days | Discharge: HOME | DRG: 176 | End: 2017-12-14
Attending: EMERGENCY MEDICINE | Admitting: INTERNAL MEDICINE
Payer: MEDICARE

## 2017-12-13 ENCOUNTER — OFFICE VISIT (OUTPATIENT)
Dept: FAMILY MEDICINE | Facility: CLINIC | Age: 72
End: 2017-12-13
Attending: FAMILY MEDICINE
Payer: MEDICARE

## 2017-12-13 ENCOUNTER — TELEPHONE (OUTPATIENT)
Dept: FAMILY MEDICINE | Facility: CLINIC | Age: 72
End: 2017-12-13

## 2017-12-13 VITALS
BODY MASS INDEX: 23.57 KG/M2 | SYSTOLIC BLOOD PRESSURE: 101 MMHG | DIASTOLIC BLOOD PRESSURE: 68 MMHG | HEART RATE: 91 BPM | OXYGEN SATURATION: 98 % | WEIGHT: 128.06 LBS | HEIGHT: 62 IN

## 2017-12-13 DIAGNOSIS — R06.02 SOB (SHORTNESS OF BREATH): ICD-10-CM

## 2017-12-13 DIAGNOSIS — I26.09 OTHER ACUTE PULMONARY EMBOLISM WITH ACUTE COR PULMONALE: ICD-10-CM

## 2017-12-13 DIAGNOSIS — I26.99 OTHER ACUTE PULMONARY EMBOLISM WITHOUT ACUTE COR PULMONALE: Primary | ICD-10-CM

## 2017-12-13 DIAGNOSIS — N18.30 KIDNEY DISEASE, CHRONIC, STAGE III (GFR 30-59 ML/MIN): ICD-10-CM

## 2017-12-13 DIAGNOSIS — J44.89 COPD (CHRONIC OBSTRUCTIVE PULMONARY DISEASE) WITH CHRONIC BRONCHITIS: Chronic | ICD-10-CM

## 2017-12-13 DIAGNOSIS — F41.9 ANXIETY: ICD-10-CM

## 2017-12-13 DIAGNOSIS — I26.99 PULMONARY EMBOLUS: ICD-10-CM

## 2017-12-13 DIAGNOSIS — M81.0 OSTEOPOROSIS, UNSPECIFIED OSTEOPOROSIS TYPE, UNSPECIFIED PATHOLOGICAL FRACTURE PRESENCE: ICD-10-CM

## 2017-12-13 DIAGNOSIS — M54.41 CHRONIC MIDLINE LOW BACK PAIN WITH RIGHT-SIDED SCIATICA: ICD-10-CM

## 2017-12-13 DIAGNOSIS — G89.29 CHRONIC MIDLINE LOW BACK PAIN WITH RIGHT-SIDED SCIATICA: ICD-10-CM

## 2017-12-13 DIAGNOSIS — R06.02 SOB (SHORTNESS OF BREATH): Primary | ICD-10-CM

## 2017-12-13 LAB
APTT BLDCRRT: 24.4 SEC
BACTERIA #/AREA URNS HPF: NORMAL /HPF
BASOPHILS # BLD AUTO: 0.06 K/UL
BASOPHILS NFR BLD: 0.6 %
BILIRUB UR QL STRIP: NEGATIVE
BNP SERPL-MCNC: 28 PG/ML
CLARITY UR: CLEAR
COLOR UR: YELLOW
DIFFERENTIAL METHOD: ABNORMAL
EOSINOPHIL # BLD AUTO: 0.9 K/UL
EOSINOPHIL NFR BLD: 8.8 %
ERYTHROCYTE [DISTWIDTH] IN BLOOD BY AUTOMATED COUNT: 13.9 %
GLUCOSE UR QL STRIP: NEGATIVE
HCT VFR BLD AUTO: 36.1 %
HGB BLD-MCNC: 11.5 G/DL
HGB UR QL STRIP: NEGATIVE
INR PPP: 0.9
KETONES UR QL STRIP: NEGATIVE
LEUKOCYTE ESTERASE UR QL STRIP: ABNORMAL
LYMPHOCYTES # BLD AUTO: 3.2 K/UL
LYMPHOCYTES NFR BLD: 32.4 %
MCH RBC QN AUTO: 29.9 PG
MCHC RBC AUTO-ENTMCNC: 31.9 G/DL
MCV RBC AUTO: 94 FL
MICROSCOPIC COMMENT: NORMAL
MONOCYTES # BLD AUTO: 1 K/UL
MONOCYTES NFR BLD: 9.8 %
NEUTROPHILS # BLD AUTO: 4.7 K/UL
NEUTROPHILS NFR BLD: 48.2 %
NITRITE UR QL STRIP: NEGATIVE
PH UR STRIP: 6 [PH] (ref 5–8)
PLATELET # BLD AUTO: 278 K/UL
PLATELET # BLD AUTO: 278 K/UL
PMV BLD AUTO: 9.9 FL
PMV BLD AUTO: 9.9 FL
PROT UR QL STRIP: NEGATIVE
PROTHROMBIN TIME: 9.7 SEC
RBC # BLD AUTO: 3.85 M/UL
RBC #/AREA URNS HPF: 0 /HPF (ref 0–4)
SP GR UR STRIP: <=1.005 (ref 1–1.03)
SQUAMOUS #/AREA URNS HPF: NORMAL /HPF
TROPONIN I SERPL DL<=0.01 NG/ML-MCNC: 0.02 NG/ML
URN SPEC COLLECT METH UR: ABNORMAL
UROBILINOGEN UR STRIP-ACNC: NEGATIVE EU/DL
WBC # BLD AUTO: 9.79 K/UL
WBC #/AREA URNS HPF: 4 /HPF (ref 0–5)

## 2017-12-13 PROCEDURE — 93005 ELECTROCARDIOGRAM TRACING: CPT

## 2017-12-13 PROCEDURE — 93010 ELECTROCARDIOGRAM REPORT: CPT | Mod: ,,, | Performed by: INTERNAL MEDICINE

## 2017-12-13 PROCEDURE — 96366 THER/PROPH/DIAG IV INF ADDON: CPT

## 2017-12-13 PROCEDURE — 85025 COMPLETE CBC W/AUTO DIFF WBC: CPT | Mod: 91

## 2017-12-13 PROCEDURE — 84484 ASSAY OF TROPONIN QUANT: CPT

## 2017-12-13 PROCEDURE — 83880 ASSAY OF NATRIURETIC PEPTIDE: CPT

## 2017-12-13 PROCEDURE — 81000 URINALYSIS NONAUTO W/SCOPE: CPT

## 2017-12-13 PROCEDURE — 96376 TX/PRO/DX INJ SAME DRUG ADON: CPT

## 2017-12-13 PROCEDURE — A9540 TC99M MAA: HCPCS

## 2017-12-13 PROCEDURE — 71250 CT THORAX DX C-: CPT | Mod: TC

## 2017-12-13 PROCEDURE — 99214 OFFICE O/P EST MOD 30 MIN: CPT | Mod: S$GLB,,, | Performed by: FAMILY MEDICINE

## 2017-12-13 PROCEDURE — 85730 THROMBOPLASTIN TIME PARTIAL: CPT

## 2017-12-13 PROCEDURE — 71250 CT THORAX DX C-: CPT | Mod: 26,,, | Performed by: RADIOLOGY

## 2017-12-13 PROCEDURE — 96360 HYDRATION IV INFUSION INIT: CPT | Mod: 59

## 2017-12-13 PROCEDURE — 85610 PROTHROMBIN TIME: CPT

## 2017-12-13 PROCEDURE — 99285 EMERGENCY DEPT VISIT HI MDM: CPT | Mod: 25

## 2017-12-13 PROCEDURE — 63600175 PHARM REV CODE 636 W HCPCS: Performed by: EMERGENCY MEDICINE

## 2017-12-13 PROCEDURE — 78582 LUNG VENTILAT&PERFUS IMAGING: CPT | Mod: 26,,, | Performed by: RADIOLOGY

## 2017-12-13 PROCEDURE — 96365 THER/PROPH/DIAG IV INF INIT: CPT

## 2017-12-13 PROCEDURE — 25000003 PHARM REV CODE 250: Performed by: STUDENT IN AN ORGANIZED HEALTH CARE EDUCATION/TRAINING PROGRAM

## 2017-12-13 PROCEDURE — A9558 XE133 XENON 10MCI: HCPCS

## 2017-12-13 PROCEDURE — 21400001 HC TELEMETRY ROOM

## 2017-12-13 PROCEDURE — 99999 PR PBB SHADOW E&M-EST. PATIENT-LVL IV: CPT | Mod: PBBFAC,,, | Performed by: FAMILY MEDICINE

## 2017-12-13 PROCEDURE — 99499 UNLISTED E&M SERVICE: CPT | Mod: S$GLB,,, | Performed by: FAMILY MEDICINE

## 2017-12-13 PROCEDURE — 25000003 PHARM REV CODE 250: Performed by: EMERGENCY MEDICINE

## 2017-12-13 RX ORDER — IPRATROPIUM BROMIDE AND ALBUTEROL SULFATE 2.5; .5 MG/3ML; MG/3ML
3 SOLUTION RESPIRATORY (INHALATION) EVERY 4 HOURS PRN
Status: DISCONTINUED | OUTPATIENT
Start: 2017-12-13 | End: 2017-12-14 | Stop reason: HOSPADM

## 2017-12-13 RX ORDER — TRAZODONE HYDROCHLORIDE 50 MG/1
50 TABLET ORAL NIGHTLY PRN
Status: DISCONTINUED | OUTPATIENT
Start: 2017-12-13 | End: 2017-12-14 | Stop reason: HOSPADM

## 2017-12-13 RX ORDER — HEPARIN SODIUM,PORCINE/D5W 25000/250
16 INTRAVENOUS SOLUTION INTRAVENOUS CONTINUOUS
Status: DISCONTINUED | OUTPATIENT
Start: 2017-12-13 | End: 2017-12-14

## 2017-12-13 RX ORDER — HEPARIN SODIUM 1000 [USP'U]/ML
1000 INJECTION, SOLUTION INTRAVENOUS; SUBCUTANEOUS
Status: COMPLETED | OUTPATIENT
Start: 2017-12-13 | End: 2017-12-13

## 2017-12-13 RX ORDER — FLUTICASONE FUROATE AND VILANTEROL 100; 25 UG/1; UG/1
1 POWDER RESPIRATORY (INHALATION) DAILY
Status: DISCONTINUED | OUTPATIENT
Start: 2017-12-14 | End: 2017-12-14 | Stop reason: HOSPADM

## 2017-12-13 RX ORDER — SODIUM CHLORIDE 0.9 % (FLUSH) 0.9 %
5 SYRINGE (ML) INJECTION
Status: DISCONTINUED | OUTPATIENT
Start: 2017-12-13 | End: 2017-12-14 | Stop reason: HOSPADM

## 2017-12-13 RX ORDER — OXYCODONE AND ACETAMINOPHEN 10; 325 MG/1; MG/1
1 TABLET ORAL EVERY 6 HOURS PRN
Status: DISCONTINUED | OUTPATIENT
Start: 2017-12-13 | End: 2017-12-14 | Stop reason: HOSPADM

## 2017-12-13 RX ORDER — HEPARIN SODIUM 10000 [USP'U]/100ML
18 INJECTION, SOLUTION INTRAVENOUS
Status: DISCONTINUED | OUTPATIENT
Start: 2017-12-13 | End: 2017-12-13

## 2017-12-13 RX ORDER — DIAZEPAM 5 MG/1
5 TABLET ORAL DAILY PRN
Qty: 10 TABLET | Refills: 0 | Status: SHIPPED | OUTPATIENT
Start: 2017-12-13 | End: 2018-01-22 | Stop reason: SDUPTHER

## 2017-12-13 RX ADMIN — OXYCODONE HYDROCHLORIDE AND ACETAMINOPHEN 1 TABLET: 10; 325 TABLET ORAL at 11:12

## 2017-12-13 RX ADMIN — SODIUM CHLORIDE 1000 ML: 0.9 INJECTION, SOLUTION INTRAVENOUS at 09:12

## 2017-12-13 RX ADMIN — HEPARIN SODIUM 1000 UNITS: 1000 INJECTION, SOLUTION INTRAVENOUS; SUBCUTANEOUS at 07:12

## 2017-12-13 RX ADMIN — TRAZODONE HYDROCHLORIDE 50 MG: 50 TABLET ORAL at 11:12

## 2017-12-13 RX ADMIN — HEPARIN SODIUM AND DEXTROSE 16 UNITS/KG/HR: 10000; 5 INJECTION INTRAVENOUS at 07:12

## 2017-12-13 RX ADMIN — PAROXETINE HYDROCHLORIDE 30 MG: 20 TABLET, FILM COATED ORAL at 11:12

## 2017-12-13 NOTE — TELEPHONE ENCOUNTER
Dr. Martínez informed and spoke to pt. Informing her that her Lung test showed high probability of blood clot in lung - spoke to cardiology and her PCp and they both recommend ER visit for evaluation and possible admission

## 2017-12-13 NOTE — PROGRESS NOTES
Subjective:       Patient ID: Katie Quevedo is a 72 y.o. female.    Chief Complaint: Shortness of Breath (Since Septemeber)    72 yr old pleasant white female with CKD III, COPD, lumbar disc disease, osteoporosis, and other co morbidities presents today as new patient to me and for evaluation of shortness of breath. It is at rest and also with mild to moderate exertion. No chest pain or pedal edema. She is on inhalers for COPD but still have SOB. She is not on any hormonal pills or any recent surgery or prolonged immobility. She thinks something else is causing her SOB. She recently had ER visit for similar symptom and her cardiac enzymes and BNP came back normal. EKG is inconclusive. No wheezing or any fever/chills. Details as follows      COPD - on combivent as needed and advair daily - no improvement in her symptoms    CKD III - baseline - no symptoms or urine issues        Osteoporosis - was on Prolia and she stopped it - do not want to get back on it       History as below - reviewed       Shortness of Breath   This is a chronic problem. The current episode started more than 1 month ago. The problem occurs constantly. The problem has been unchanged. Pertinent negatives include no chest pain, ear pain, headaches, hemoptysis, leg swelling, orthopnea, rash, rhinorrhea, sore throat, swollen glands or wheezing. Nothing aggravates the symptoms. She has tried beta agonist inhalers and prescription cough suppressants for the symptoms. The treatment provided no relief. Her past medical history is significant for chronic lung disease and COPD. There is no history of bronchiolitis, a heart failure, PE or a recent surgery.   Anxiety   Presents for initial visit. Onset was 1 to 6 months ago. The problem has been gradually worsening. Symptoms include decreased concentration, excessive worry, nervous/anxious behavior and shortness of breath. Patient reports no chest pain, confusion, dizziness, nausea or suicidal ideas.  Symptoms occur occasionally. The severity of symptoms is mild. Nothing aggravates the symptoms. The quality of sleep is fair. Nighttime awakenings: one to two.     There are no known risk factors. Her past medical history is significant for anxiety/panic attacks and chronic lung disease. Past treatments include benzodiazephines. The treatment provided no relief. Compliance with prior treatments has been good.     Review of Systems   Constitutional: Positive for fatigue. Negative for activity change, diaphoresis and unexpected weight change.   HENT: Negative.  Negative for congestion, ear discharge, ear pain, hearing loss, rhinorrhea, sore throat and voice change.    Eyes: Negative.  Negative for pain, discharge and visual disturbance.   Respiratory: Positive for shortness of breath. Negative for hemoptysis, chest tightness and wheezing.    Cardiovascular: Negative.  Negative for chest pain, orthopnea and leg swelling.   Gastrointestinal: Negative.  Negative for abdominal distention, anal bleeding, constipation and nausea.   Endocrine: Negative.  Negative for cold intolerance, polydipsia and polyuria.   Genitourinary: Negative.  Negative for decreased urine volume, difficulty urinating, dysuria, frequency, menstrual problem and vaginal pain.   Musculoskeletal: Negative.  Negative for arthralgias, gait problem and myalgias.   Skin: Negative.  Negative for color change, pallor, rash and wound.   Allergic/Immunologic: Negative.  Negative for environmental allergies and immunocompromised state.   Neurological: Negative.  Negative for dizziness, tremors, seizures, speech difficulty and headaches.   Hematological: Negative.  Negative for adenopathy. Does not bruise/bleed easily.   Psychiatric/Behavioral: Positive for decreased concentration and dysphoric mood. Negative for agitation, confusion, hallucinations, self-injury and suicidal ideas. The patient is nervous/anxious.        PMH/PSH/FH/SH/MED/ALLERGY  reviewed    Objective:       Vitals:    12/13/17 1107   BP: 101/68   Pulse: 91       Physical Exam   Constitutional: She is oriented to person, place, and time. She appears well-developed and well-nourished. No distress.   HENT:   Head: Normocephalic and atraumatic.   Right Ear: External ear normal.   Left Ear: External ear normal.   Nose: Nose normal.   Mouth/Throat: Oropharynx is clear and moist. No oropharyngeal exudate.   Eyes: Conjunctivae and EOM are normal. Pupils are equal, round, and reactive to light. Right eye exhibits no discharge. Left eye exhibits no discharge. No scleral icterus.   Neck: Normal range of motion. Neck supple. No JVD present. No tracheal deviation present. No thyromegaly present.   Cardiovascular: Normal rate, regular rhythm, normal heart sounds and intact distal pulses.  Exam reveals no gallop and no friction rub.    No murmur heard.  Pulmonary/Chest: Effort normal and breath sounds normal. No stridor. She has no wheezes. She has no rales. She exhibits no tenderness.   Abdominal: Soft. Bowel sounds are normal. She exhibits no distension and no mass. There is no tenderness. There is no rebound and no guarding. No hernia.   Musculoskeletal: Normal range of motion. She exhibits no edema or tenderness.   Lymphadenopathy:     She has no cervical adenopathy.   Neurological: She is alert and oriented to person, place, and time. She has normal reflexes. She displays normal reflexes. No cranial nerve deficit. She exhibits normal muscle tone. Coordination normal.   Skin: Skin is warm and dry. No rash noted. She is not diaphoretic. No erythema. No pallor.   Psychiatric: She has a normal mood and affect. Her behavior is normal. Judgment and thought content normal.       Assessment:       1. SOB (shortness of breath)    2. COPD (chronic obstructive pulmonary disease) with chronic bronchitis    3. Kidney disease, chronic, stage III (GFR 30-59 ml/min)    4. Osteoporosis, unspecified osteoporosis type,  unspecified pathological fracture presence    5. Anxiety        Plan:     Katie was seen today for shortness of breath.    Diagnoses and all orders for this visit:    SOB (shortness of breath)  -     CT Chest Without Contrast; Future  -     CBC auto differential; Future  -     Comprehensive metabolic panel; Future  -     D dimer, quantitative; Future  -     NM Lung Ventilation Perfusion Imaging; Future    COPD (chronic obstructive pulmonary disease) with chronic bronchitis  -     CT Chest Without Contrast; Future    Kidney disease, chronic, stage III (GFR 30-59 ml/min)    Osteoporosis, unspecified osteoporosis type, unspecified pathological fracture presence    Anxiety  -     diazePAM (VALIUM) 5 MG tablet; Take 1 tablet (5 mg total) by mouth daily as needed for Anxiety.      SOB  -DD COPD, PE  -CT chest and V/Q scan  -ER precautions given    Anxiety  -uncontrolled  -valium as needed very sparingly    CKD III  -hydration  -avoid nephrotoxics    OP  -not on prolia    Spent adequate time in obtaining history and explaining differentials    40 minutes spent during this visit of which greater than 50% devoted to face-face counseling and coordination of care regarding diagnosis and management plan    Return in about 3 months (around 3/13/2018), or if symptoms worsen or fail to improve.

## 2017-12-13 NOTE — TELEPHONE ENCOUNTER
----- Message from Sonu Martínez MD sent at 12/13/2017  2:45 PM CST -----  Call    Lung test showed high probability of blood clot in lung - spoke to cardiology and her PCp and they both recommend ER visit for evaluation and possible admission

## 2017-12-14 ENCOUNTER — TELEPHONE (OUTPATIENT)
Dept: FAMILY MEDICINE | Facility: CLINIC | Age: 72
End: 2017-12-14

## 2017-12-14 VITALS
OXYGEN SATURATION: 95 % | DIASTOLIC BLOOD PRESSURE: 50 MMHG | WEIGHT: 130.06 LBS | RESPIRATION RATE: 18 BRPM | BODY MASS INDEX: 23.93 KG/M2 | TEMPERATURE: 98 F | HEIGHT: 62 IN | SYSTOLIC BLOOD PRESSURE: 103 MMHG | HEART RATE: 77 BPM

## 2017-12-14 LAB
ALBUMIN SERPL BCP-MCNC: 3.1 G/DL
ALLENS TEST: ABNORMAL
ALP SERPL-CCNC: 69 U/L
ALT SERPL W/O P-5'-P-CCNC: 9 U/L
ANION GAP SERPL CALC-SCNC: 8 MMOL/L
APTT BLDCRRT: 61.8 SEC
APTT BLDCRRT: 69.6 SEC
APTT BLDCRRT: 69.6 SEC
AST SERPL-CCNC: 17 U/L
BILIRUB SERPL-MCNC: 0.2 MG/DL
BUN SERPL-MCNC: 32 MG/DL
CALCIUM SERPL-MCNC: 8.5 MG/DL
CHLORIDE SERPL-SCNC: 109 MMOL/L
CO2 SERPL-SCNC: 23 MMOL/L
CREAT SERPL-MCNC: 2 MG/DL
DELSYS: ABNORMAL
EST. GFR  (AFRICAN AMERICAN): 28 ML/MIN/1.73 M^2
EST. GFR  (NON AFRICAN AMERICAN): 24 ML/MIN/1.73 M^2
FACT X PPP CHRO-ACNC: 0.7 IU/ML
FACT X PPP CHRO-ACNC: 0.7 IU/ML
GLUCOSE SERPL-MCNC: 131 MG/DL
HCO3 UR-SCNC: 22.4 MMOL/L (ref 24–28)
MAGNESIUM SERPL-MCNC: 2 MG/DL
PCO2 BLDA: 43.9 MMHG (ref 35–45)
PH SMN: 7.32 [PH] (ref 7.35–7.45)
PHOSPHATE SERPL-MCNC: 4.5 MG/DL
PO2 BLDA: 56 MMHG (ref 80–100)
POC BE: -4 MMOL/L
POC SATURATED O2: 86 % (ref 95–100)
POC TCO2: 24 MMOL/L (ref 23–27)
POTASSIUM SERPL-SCNC: 4.3 MMOL/L
PROCALCITONIN SERPL IA-MCNC: 0.09 NG/ML
PROT SERPL-MCNC: 6.2 G/DL
SAMPLE: ABNORMAL
SITE: ABNORMAL
SODIUM SERPL-SCNC: 140 MMOL/L
TROPONIN I SERPL DL<=0.01 NG/ML-MCNC: <0.006 NG/ML
TROPONIN I SERPL DL<=0.01 NG/ML-MCNC: <0.006 NG/ML

## 2017-12-14 PROCEDURE — 94640 AIRWAY INHALATION TREATMENT: CPT

## 2017-12-14 PROCEDURE — 84145 PROCALCITONIN (PCT): CPT

## 2017-12-14 PROCEDURE — 85520 HEPARIN ASSAY: CPT

## 2017-12-14 PROCEDURE — 83735 ASSAY OF MAGNESIUM: CPT

## 2017-12-14 PROCEDURE — 99222 1ST HOSP IP/OBS MODERATE 55: CPT | Mod: GC,,, | Performed by: INTERNAL MEDICINE

## 2017-12-14 PROCEDURE — 93005 ELECTROCARDIOGRAM TRACING: CPT

## 2017-12-14 PROCEDURE — 27000221 HC OXYGEN, UP TO 24 HOURS

## 2017-12-14 PROCEDURE — 85730 THROMBOPLASTIN TIME PARTIAL: CPT

## 2017-12-14 PROCEDURE — 84484 ASSAY OF TROPONIN QUANT: CPT

## 2017-12-14 PROCEDURE — 80053 COMPREHEN METABOLIC PANEL: CPT

## 2017-12-14 PROCEDURE — 93010 ELECTROCARDIOGRAM REPORT: CPT | Mod: ,,, | Performed by: INTERNAL MEDICINE

## 2017-12-14 PROCEDURE — 25000003 PHARM REV CODE 250: Performed by: STUDENT IN AN ORGANIZED HEALTH CARE EDUCATION/TRAINING PROGRAM

## 2017-12-14 PROCEDURE — 36415 COLL VENOUS BLD VENIPUNCTURE: CPT

## 2017-12-14 PROCEDURE — 82803 BLOOD GASES ANY COMBINATION: CPT

## 2017-12-14 PROCEDURE — 36600 WITHDRAWAL OF ARTERIAL BLOOD: CPT

## 2017-12-14 PROCEDURE — 84100 ASSAY OF PHOSPHORUS: CPT

## 2017-12-14 PROCEDURE — 85730 THROMBOPLASTIN TIME PARTIAL: CPT | Mod: 91

## 2017-12-14 PROCEDURE — 25000242 PHARM REV CODE 250 ALT 637 W/ HCPCS: Performed by: STUDENT IN AN ORGANIZED HEALTH CARE EDUCATION/TRAINING PROGRAM

## 2017-12-14 PROCEDURE — 84484 ASSAY OF TROPONIN QUANT: CPT | Mod: 91

## 2017-12-14 PROCEDURE — 93306 TTE W/DOPPLER COMPLETE: CPT | Mod: 26,,, | Performed by: INTERNAL MEDICINE

## 2017-12-14 PROCEDURE — 93306 TTE W/DOPPLER COMPLETE: CPT

## 2017-12-14 RX ADMIN — IPRATROPIUM BROMIDE AND ALBUTEROL SULFATE 3 ML: .5; 3 SOLUTION RESPIRATORY (INHALATION) at 03:12

## 2017-12-14 RX ADMIN — PAROXETINE HYDROCHLORIDE 30 MG: 20 TABLET, FILM COATED ORAL at 10:12

## 2017-12-14 RX ADMIN — OXYCODONE HYDROCHLORIDE AND ACETAMINOPHEN 1 TABLET: 10; 325 TABLET ORAL at 10:12

## 2017-12-14 RX ADMIN — APIXABAN 10 MG: 5 TABLET, FILM COATED ORAL at 12:12

## 2017-12-14 RX ADMIN — FLUTICASONE FUROATE AND VILANTEROL TRIFENATATE 1 PUFF: 100; 25 POWDER RESPIRATORY (INHALATION) at 08:12

## 2017-12-14 NOTE — TELEPHONE ENCOUNTER
----- Message from Yu Barakat sent at 12/14/2017  9:31 AM CST -----  Contact: [phone]/Cresencio with [facility]  pharmacy its requesting to speak with the nurse in regarding pt's medications . Please advise

## 2017-12-14 NOTE — PLAN OF CARE
Patient is independent and drives, currently accompanied by spouse who is independent and drives.  Dre Quevedo Spouse [phone]     TN gave Pharmacy and Wellness Brochure to patient, would like bedside delivery on meds. Prefers early afternoon appointments.  Follow-up With  Details  Why  Contact Info   Clemencia Gambino MD    early afternoon appt requested  [address]  [address]  [place] [address]  [phone]             12/14/17 1420   Discharge Assessment   Assessment Type Discharge Planning Assessment   Confirmed/corrected address and phone number on facesheet? Yes   Assessment information obtained from? Patient   Expected Length of Stay (days) 1   Communicated expected length of stay with patient/caregiver yes   Prior to hospitilization cognitive status: Alert/Oriented   Prior to hospitalization functional status: Independent   Current cognitive status: Alert/Oriented   Current Functional Status: Independent   Facility Arrived From: home or   Lives With spouse   Able to Return to Prior Arrangements yes   Is patient able to care for self after discharge? Yes   Who are your caregiver(s) and their phone number(s)? Dre Quevedo Spouse [phone]    Patient's perception of discharge disposition home or selfcare   Readmission Within The Last 30 Days no previous admission in last 30 days   Patient currently being followed by outpatient case management? No   Patient currently receives any other outside agency services? No   Equipment Currently Used at Home none   Do you have any problems affording any of your prescribed medications? TBD   Is the patient taking medications as prescribed? yes   Does the patient have transportation home? Yes   Transportation Available family or friend will provide   Dialysis Name and Scheduled days n/a   Does the patient receive services at the [facility]madin Clinic? No   Discharge Plan A Home with family   Patient/Family In Agreement With Plan yes    Does the patient have transportation to healthcare appointments? Yes

## 2017-12-14 NOTE — ED PROVIDER NOTES
"Joy Date: 12/13/2017    SCRIBE #1 NOTE: I, Divina Constantino , am scribing for, and in the presence of,  Dr. Wills . I have scribed the entire note.       History     Chief Complaint   Patient presents with    Shortness of Breath     sent by Dr. Benitez for probable PE.      12/13/2017  6:22 PM     Chief Complaint: SOB    The patient is a 72 y.o. female who presents per EMS for the acute on chronic exacerbation of SOB. The pt reports that she is constantly SOB and must "work really hard to catch her breath" that began several months ago but has worsened in the last several days. She endorses associated intermittent productive cough with yellow sputum, and persistent hypoxia when recorded at home with pulse-ox. Of note, the pt was seen just PTA by her PCP with CT chest, CXR and basic labs. Studies were remarkable for "nodules" present in the lung and a elevated d-dimer. Pt was then sent to the ED for PE r/o. Pertinent PMHx includes anemia, COPD, CKD. No pertinent past surgical hx.              The history is provided by the patient, the spouse and medical records.     Review of patient's allergies indicates:   Allergen Reactions    Aspirin      Other reaction(s): hx of ulcers    Penicillins      Other reaction(s): Hives  Other reaction(s): Rash  Other reaction(s): Rash  Other reaction(s): Hives    Tetracycline      Other reaction(s): Swelling     Past Medical History:   Diagnosis Date    Anemia     Cataract     Chronic LBP 7/26/2012    Chronic pain     CKD (chronic kidney disease)     COPD (chronic obstructive pulmonary disease)     Dehydration     Encounter for blood transfusion     Lumbar spondylosis     Melanoma     of the lip    Migraines, neuralgic     Osteoporosis     Primary osteoarthritis of both knees     s/p Rt TKA    Seizures     Subdeltoid bursitis, L>R. 9/27/2012    Ulcer     Vitamin D deficiency disease      Past Surgical History:   Procedure Laterality Date    BLADDER " SUSPENSION      CATARACT EXTRACTION  11/18/13    left eye    CERVICAL LAMINECTOMY      x3, fusion x1    COLONOSCOPY  2009    HYSTERECTOMY      JOINT REPLACEMENT  2001    total right knee     LUMBAR LAMINECTOMY      x 3, fusion x1     Family History   Problem Relation Age of Onset    Arthritis Mother     Stroke Mother     Hypertension Father     Cancer Father     Cataracts Father     Diabetes Maternal Aunt     Hypertension Maternal Grandfather     Heart disease Maternal Grandfather     Heart attack Maternal Grandfather     Cataracts Sister     Glaucoma Cousin     Amblyopia Neg Hx     Blindness Neg Hx     Macular degeneration Neg Hx     Retinal detachment Neg Hx     Strabismus Neg Hx      Social History   Substance Use Topics    Smoking status: Former Smoker     Types: Cigarettes    Smokeless tobacco: Former User    Alcohol use Yes      Comment: Rare     Review of Systems   Constitutional: Negative for fever.   HENT: Negative for sore throat.    Respiratory: Positive for cough and shortness of breath.    Cardiovascular: Negative for chest pain.   Gastrointestinal: Negative for nausea.   Genitourinary: Negative for dysuria.   Musculoskeletal: Negative for back pain.   Skin: Negative for rash.   Neurological: Negative for weakness.   Hematological: Does not bruise/bleed easily.       Physical Exam     Initial Vitals [12/13/17 1744]   BP Pulse Resp Temp SpO2   (!) 164/83 107 (!) 28 98.1 °F (36.7 °C) 95 %      MAP       110         Physical Exam    Nursing note and vitals reviewed.  Constitutional: She appears well-developed and well-nourished.   HENT:   Head: Normocephalic and atraumatic.   Mouth/Throat: Oropharynx is clear and moist.   Eyes: Conjunctivae and EOM are normal. Pupils are equal, round, and reactive to light.   Neck: Normal range of motion. Neck supple. No tracheal deviation present.   Cardiovascular: Normal rate, regular rhythm, normal heart sounds and intact distal pulses. Exam  reveals no gallop and no friction rub.    No murmur heard.  Pulses:       Radial pulses are 2+ on the right side, and 2+ on the left side.        Dorsalis pedis pulses are 2+ on the right side, and 2+ on the left side.   Pulmonary/Chest: She has no wheezes. She has no rhonchi. She has no rales. She exhibits no tenderness.   Normal inspiratory phase with prolonged expiratory phase. No wheezing, rhonchi noted.    Abdominal: Soft. She exhibits no distension. There is no tenderness.   Musculoskeletal: Normal range of motion. She exhibits no edema or tenderness.   No step off or deformity of the spine. There is no tenderness or edema to the anterior or posterior BLE. No palpable cord tenderness. 2+ DP pulses.    Lymphadenopathy:     She has no cervical adenopathy.   Neurological: She is alert and oriented to person, place, and time. No sensory deficit.   Skin: Capillary refill takes less than 2 seconds. No rash noted. No erythema.   No warmth or erythema noted to the BLE.    Psychiatric: She has a normal mood and affect.         ED Course   Procedures  Labs Reviewed   CBC W/ AUTO DIFFERENTIAL - Abnormal; Notable for the following:        Result Value    RBC 3.85 (*)     Hemoglobin 11.5 (*)     Hematocrit 36.1 (*)     MCHC 31.9 (*)     Eos # 0.9 (*)     Eosinophil% 8.8 (*)     All other components within normal limits    Narrative:     INR if patient is on warfarin prior to heparin therapy if not  ordered already   PLATELET COUNT    Narrative:     INR if patient is on warfarin prior to heparin therapy if not  ordered already   APTT   PROTIME-INR   APTT   PROTIME-INR   APTT   PROTIME-INR   PLATELET COUNT   CBC W/ AUTO DIFFERENTIAL     EKG Readings: (Independently Interpreted)   Initial Reading: No STEMI. Heart Rate: 96.   NSR. Normal axis. Normal intervals. Normal HI, QRS, QTC, with low voltage. No evidence of acute ischemia, no age undetermined anterior infarct. No hypertrophy noted.           Medical Decision Making:    Differential Diagnosis:   Acute MI, PE, PNA, dehydration, bronchitis, electrolyte abnormalities   Independently Interpreted Test(s):   I have ordered and independently interpreted EKG Reading(s) - see prior notes  Clinical Tests:   Lab Tests: Ordered and Reviewed  Radiological Study: Reviewed and Ordered  Medical Tests: Ordered and Reviewed  ED Management:  6:24 PM   Given imaging studies provided by PCP with labs the pt will be started on a Heparin drip for PE. PTT, PT ordered. Pt will be admitted for further evaluation and sx management..   7:42 PM  Spoke with pt's cardiologist. Will admit to Medicine and he agrees that he will be cardiology consult.   Other:   I have discussed this case with another health care provider.       <> Summary of the Discussion: 7:24 PM  Spoke with Dr. Ledezma who suggested speaking with PCP             Scribe Attestation:   Scribe #1: I performed the above scribed service and the documentation accurately describes the services I performed. I attest to the accuracy of the note.    Attending Attestation:             Attending ED Notes:   8:01 PM  Pt discussed with admitting hospitalist with [facility] family medicine. Pt is hemodynamically stable at this time.           ED Course      Clinical Impression:   PE - dyspnea,       Disposition:   Disposition: Admitted  Condition: Stable                        Juan Wills MD  12/14/17 0058

## 2017-12-14 NOTE — HPI
71yo female with history of COPD, melanoma and CKD stage III who presents to the ER with complaints of SOB for the past 3 months. She complains of SOB with exertion with progressive worsening recently. Initially it started with minimal activity ie walking room to room or with turning over in bed. The SOB would relieve with rest after several minutes. She complains of associated symptoms of palpitations and chest pressure/heaviness with the SOB. She describes the palpitations as a racing sensation. Her chest pain is described as midsternal pressure/heaviness not associated with radiation, nausea, vomiting or diaphoresis. Her chest pain and palpitations only occur with SOB and never independently. She denies any orthopnea, PND or edema. At first, she attributed her SOB to her recent diagnosis/treatment of pneumonia or COPD. She reports she has been mainly in bed since September due to her SOB. She denies any cardiac history and denies any previous PCI/CABG

## 2017-12-14 NOTE — TELEPHONE ENCOUNTER
Spoke to pharmacist, Cresencio at [facility] and stated that she has a prescription for Valium for the pt and her records showed that the pt was no longer taking it. Cresencio stated that pt already takes Percocet nd Zanaflex. Cresencio would like to know if she should fill the Valium. Pls advise.

## 2017-12-14 NOTE — ASSESSMENT & PLAN NOTE
-VQ scan with intermediate probability  -BLE venous ultrasound with no evidence of DVT  -complaints of palpitations and chest pain with SOB concerning for PE etiology  -EKG with no acute ischemic changes and troponin negative  -agree with treatment with IV Heparin with plans for transition to oral AC and feel good candidate for NOACs  -considered cardiac etiology as well but less likely given symptoms; echo pending; if LVEF depressed will re evaluate if LVEF normal recommend treatment for PE

## 2017-12-14 NOTE — PROGRESS NOTES
Per MD, patient does NOT need home oxygen for discharge.    Sent message via [employer] to Dr. Morrow's office for hospital discharge followup.    MD will send prescription for free 30 day prescription of Eliquis to [facility] for bedside delivery. patient to get paper prescription to bring to her own pharmacy.    Virginia Schmitt, RN, CCM, CMSRN  RN Transition Navigator  [phone]

## 2017-12-14 NOTE — SUBJECTIVE & OBJECTIVE
Past Medical History:   Diagnosis Date    Anemia     Cataract     Chronic LBP 7/26/2012    Chronic pain     CKD (chronic kidney disease)     COPD (chronic obstructive pulmonary disease)     Dehydration     Encounter for blood transfusion     Lumbar spondylosis     Melanoma     of the lip    Migraines, neuralgic     Osteoporosis     Primary osteoarthritis of both knees     s/p Rt TKA    Seizures     Subdeltoid bursitis, L>R. 9/27/2012    Ulcer     Vitamin D deficiency disease        Past Surgical History:   Procedure Laterality Date    BLADDER SUSPENSION      CATARACT EXTRACTION  11/18/13    left eye    CERVICAL LAMINECTOMY      x3, fusion x1    COLONOSCOPY  2009    HYSTERECTOMY      JOINT REPLACEMENT  2001    total right knee     LUMBAR LAMINECTOMY      x 3, fusion x1       Review of patient's allergies indicates:   Allergen Reactions    Aspirin      Other reaction(s): hx of ulcers    Penicillins      Other reaction(s): Hives  Other reaction(s): Rash  Other reaction(s): Rash  Other reaction(s): Hives    Tetracycline      Other reaction(s): Swelling       No current facility-administered medications on file prior to encounter.      Current Outpatient Prescriptions on File Prior to Encounter   Medication Sig    fluticasone (FLONASE) 50 mcg/actuation nasal spray 1 spray by Each Nare route once daily.    fluticasone-salmeterol 250-50 mcg/dose (ADVAIR) 250-50 mcg/dose diskus inhaler Inhale 1 puff into the lungs 2 (two) times daily.    ipratropium-albuterol (COMBIVENT)  mcg/actuation inhaler Inhale 1 puff into the lungs every 4 (four) hours as needed for Wheezing. Rescue    paroxetine (PAXIL) 30 MG tablet Take 30 mg by mouth once daily. 2 tablets daily at night    tiZANidine (ZANAFLEX) 4 MG tablet TAKE 1-2 TABLETS BY MOUTH THREE TIMES A DAY AS NEEDED    trazodone (DESYREL) 50 MG tablet TAKE 1-2 TABLETS BY MOUTH NIGHTLY AS NEEDED FOR INSOMNIA    [DISCONTINUED] morphine (MS CONTIN) 15  MG 12 hr tablet Take 1 tablet (15 mg total) by mouth 2 (two) times daily. (Patient taking differently: Take 15 mg by mouth 2 (two) times daily as needed. )    [DISCONTINUED] oxyCODONE-acetaminophen (PERCOCET)  mg per tablet Take 1 tablet by mouth 3 (three) times daily as needed.    diazePAM (VALIUM) 5 MG tablet Take 1 tablet (5 mg total) by mouth daily as needed for Anxiety.    triamcinolone acetonide 0.1% (KENALOG) 0.1 % cream Use hs prn itching     Family History     Problem Relation (Age of Onset)    Arthritis Mother    Cancer Father    Cataracts Father, Sister    Diabetes Maternal Aunt    Glaucoma Cousin    Heart attack Maternal Grandfather    Heart disease Maternal Grandfather    Hypertension Father, Maternal Grandfather    Stroke Mother        Social History Main Topics    Smoking status: Former Smoker     Types: Cigarettes    Smokeless tobacco: Never Used    Alcohol use Yes      Comment: Rare    Drug use: No    Sexual activity: No     Review of Systems   Constitution: Negative for chills, decreased appetite, diaphoresis, fever and weakness.   Cardiovascular: Positive for dyspnea on exertion and palpitations. Negative for chest pain, claudication, cyanosis, irregular heartbeat, leg swelling, near-syncope, orthopnea, paroxysmal nocturnal dyspnea and syncope.   Respiratory: Positive for shortness of breath. Negative for cough, hemoptysis and wheezing.    Gastrointestinal: Negative for bloating, abdominal pain, constipation, diarrhea, melena, nausea and vomiting.   Neurological: Negative for dizziness.     Objective:     Vital Signs (Most Recent):  Temp: 97.9 °F (36.6 °C) (12/14/17 1200)  Pulse: 91 (12/14/17 1200)  Resp: 18 (12/14/17 1200)  BP: (!) 90/50 (12/14/17 1246)  SpO2: 95 % (12/14/17 1211) Vital Signs (24h Range):  Temp:  [97.5 °F (36.4 °C)-98.2 °F (36.8 °C)] 97.9 °F (36.6 °C)  Pulse:  [] 91  Resp:  [17-28] 18  SpO2:  [95 %-100 %] 95 %  BP: ()/(43-83) 90/50     Weight: 59 kg (130  lb 1.1 oz)  Body mass index is 23.6 kg/m².    SpO2: 95 %  O2 Device (Oxygen Therapy): nasal cannula      Intake/Output Summary (Last 24 hours) at 12/14/17 1402  Last data filed at 12/14/17 0921   Gross per 24 hour   Intake           230.95 ml   Output              900 ml   Net          -669.05 ml       Lines/Drains/Airways     Airway                 Airway - Non-Surgical Nasal Cannula -- days          Peripheral Intravenous Line                 Peripheral IV - Single Lumen 12/13/17 1815 Right Antecubital less than 1 day         Peripheral IV - Single Lumen 12/13/17 1853 Right Antecubital less than 1 day         Peripheral IV - Single Lumen 12/1943 Left Forearm less than 1 day                Physical Exam   Constitutional: She is oriented to person, place, and time. She appears well-developed and well-nourished. No distress.   Cardiovascular: Normal rate and regular rhythm.  Exam reveals no gallop.    No murmur heard.  Pulmonary/Chest: Effort normal and breath sounds normal. No respiratory distress. She has no wheezes.   Abdominal: Soft. Bowel sounds are normal. She exhibits no distension. There is no tenderness.   Neurological: She is alert and oriented to person, place, and time.   Skin: Skin is warm and dry.       Significant Labs:       Recent Labs  Lab 12/13/17  1855   WBC 9.79   RBC 3.85*   HGB 11.5*   HCT 36.1*     278   MCV 94   MCH 29.9   MCHC 31.9*       Recent Labs  Lab 12/14/17  0352      K 4.3      CO2 23   BUN 32*   CREATININE 2.0*   MG 2.0       Significant Imaging: Echocardiogram: 2D echo with color flow doppler: ordered results pending

## 2017-12-14 NOTE — PROGRESS NOTES
"[facility] IM Resident HO-1 Progress Note    Subjective:      Katie Quevedo is a 72 y.o.  female who is being followed by the [facility] IM service at [facility] for shortness of breath.     Ms. Quevedo was asleep in bed this morning. Woke easily to voice. Patient in no apparent distress. States she slept well last night. Denies difficulty breathing. Denies chest pain. Did require one PRN duo-neb treatment overnight. No other complaints. On 2L NC.      Objective:   Last 24 Hour Vital Signs:  BP  Min: 80/43  Max: 164/83  Temp  Av.9 °F (36.6 °C)  Min: 97.5 °F (36.4 °C)  Max: 98.2 °F (36.8 °C)  Pulse  Av.1  Min: 78  Max: 111  Resp  Av.6  Min: 17  Max: 28  SpO2  Av.7 %  Min: 95 %  Max: 100 %  Height  Av' 2.2" (158 cm)  Min: 5' 2" (157.5 cm)  Max: 5' 2.25" (158.1 cm)  Weight  Av.2 kg (128 lb 6.2 oz)  Min: 57.6 kg (127 lb)  Max: 59 kg (130 lb 1.1 oz)  No intake/output data recorded.    Physical Examination:  General:          Alert and awake in NAD, on 2L NC  Head:               Normocephalic and atraumatic, JVP ~ 8cm  Eyes:               PERRL; EOMi with anicteric sclera and clear conjunctivae  Mouth:             Oropharynx clear and without exudate; moist mucous membranes  Cardio:             Regular rate and rhythm with normal S1 and S2; no murmurs or rubs appreciated  Resp:               Better air movement this morning; mild end expiratory wheezes; no crackles or rhonchi appreciated  Abdom:            Soft, NTND with normoactive bowel sounds  Extrem:            WWP with no clubbing, cyanosis or edema. Candido's sign negative. No calf tenderness. No erythema or swelling.  Skin:                No rashes, lesions, or color changes  Pulses:            2+ and symmetric distally  Neuro:             AAOx3; cooperative and pleasant with no focal deficits    Laboratory:  Laboratory Data Reviewed: yes  Pertinent Findings:    Recent Labs  Lab 17  1138 17  1855 17  0352   WBC 9.19 " 9.79  --    HGB 11.5* 11.5*  --    HCT 36.0* 36.1*  --     278  278  --      --  140   K 5.1  --  4.3     --  109   CREATININE 2.0*  --  2.0*   BUN 27*  --  32*   CO2 24  --  23   ALT 9*  --  9*   AST 20  --  17     Lab Results   Component Value Date    DDIMER 2.70 (H) 12/13/2017     Troponins: 0.017--> <0.006    Arterial Blood Gas result:  pO2 56; pCO2 43.9; pH 7.315;  HCO3 22.4. FiO2: 0.30. A-a gradient: 158      Microbiology Data Reviewed: yes  Pertinent Findings:  None    Other Results:  EKG (my interpretation): NSR    Radiology Data Reviewed: yes  Pertinent Findings:  X-Ray Chest PA And Lateral [[id number]] Resulted: 12/13/17 2305   Order Status: Completed Updated: 12/13/17 2306   Narrative:     Exam: [id number] 12/13/17  22:16:58 IMG36 ([facility]) : XR CHEST PA AND LATERAL    Technique:    Frontal and lateral chest x-ray    Comparison:    CT scan of the chest dated 12/13/2017.    Findings:      Monitoring EKG leads are present.  There are postoperative changes in the base of the neck.    The trachea is unremarkable.  The cardiomediastinal silhouette is within normal limits.  The hemidiaphragms are unremarkable.  There is no evidence of free air beneath the hemidiaphragms.  No pleural effusions are present.  There is no evidence of a pneumothorax.  There is no evidence of pneumomediastinum.  No airspace opacities are present.  There is subsegmental atelectasis in the right lung base.  There are degenerative changes in the osseous structures.  The subcutaneous tissues are within normal limits.   Impression:        No acute process.    Stable appearance of subsegmental atelectasis in the right lung base.              Electronically signed by: KISHA UGARTE MD  Date: 12/13/17  Time: 23:05     Lower Extremity Veins Bilateral [[id number]] Resulted: 12/13/17 2150   Order Status: Completed Updated: 12/13/17 2150   Narrative:     Comparison: None    Clinical history: Rule out DVT    Findings:    Duplex and  color flow Doppler evaluation does not reveal any evidence of acute venous thrombosis in the common femoral, superficial femoral, greater saphenous, popliteal, peroneal, anterior tibial and posterior tibial veins bilaterally.  There is no reflux to suggest valvular incompetence.   Impression:         No evidence of acute deep venous thrombosis bilaterally.           Current Medications:     Infusions:   heparin (porcine) in D5W 14 Units/kg/hr (12/14/17 0700)        Scheduled:   fluticasone-vilanterol  1 puff Inhalation Daily    paroxetine  30 mg Oral BID        PRN:  albuterol-ipratropium 2.5mg-0.5mg/3mL, heparin (PORCINE), heparin (PORCINE), oxyCODONE-acetaminophen, sodium chloride 0.9%, traZODone    Antibiotics and Day Number of Therapy:  None    Lines and Day Number of Therapy:  PIV    Assessment:     Katie Quevedo is a 72 y.o.female with  Patient Active Problem List    Diagnosis Date Noted    Pulmonary embolus 12/13/2017    Tachycardia 11/06/2017    COPD exacerbation     Hypoxia     Pruritus 02/20/2016    Chronic low back pain 09/25/2015    Osteoporosis 09/21/2015    Osteoarthritis, hip, bilateral 10/27/2014    Lumbar radiculopathy, BLE 10/27/2014    Cervical radiculopathy, BUE 10/27/2014    COPD (chronic obstructive pulmonary disease) with chronic bronchitis 10/08/2014    Kidney disease, chronic, stage III (GFR 30-59 ml/min)     Biceps tendonitis 01/06/2014    Muscle spasm 12/18/2013    Intractable migraine 12/18/2013    Post-operative state 11/18/2013    Rotator cuff tear, right 10/18/2013    Nuclear sclerosis - Both Eyes 08/08/2013    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     Melanoma     Chronic pain     Lumbar spondylosis     Vitamin deficiency         Plan:     Pulmonary Embolism   -patient with chronic SOB for approximately 3 months  has progressively worsened over this period  -currently has SOB at rest, can walk only a few steps before needing to catch breath  -was seen by PCP in clinic today  D-dimer elevated (2.70)  PCP ordered V/Q scan which showed multiple peripheral perfusion abnormalities representing an intermediate probability of PE  CT chest w/out contrast showing several pulmonary nodules up to 5mm in size  -patient has been inactive over the last few weeks, staying in bed most of the day  -no previous hx of PE/DVT  no calf swelling or tenderness  no cough or hemoptysis  no pleuritic chest pain  -PESI score = 114 (hx of melanoma, COPD)  -started on heparin drip for anticoagulation  -EKGs showing NSR  -Troponin 0.17 --> <0.006  no chest pain  -Ordered echo, LE ultrasound, CXR, ABG, BNP  -ABG 7.315/43.9/56/22.4 FiO2 0.30  A-a gradient: 115  -CXR normal  LE ultrasound negative for DVT  BNP 28  -cardiology consulted     CKD3  -Cr: 2.0  baseline  -will monitor     Normocytic Anemia  -H/H 11.5/36.1, MCV 94 (baseline hemoglobin documented to be ~8.5)  -iron studies from September consistent with AoCD  -monitor     COPD  -on combivent PRN and advair daily  -states she uses her rescue inhaler about twice a day  -no increased sputum production recently  no fevers/chills  -duonebs q4h PRN     Chronic Pain  -hx of multiple back surgeries and neck surgeries  -on percocet, MS contin and zanaflex at home  -continue percocet PRN     Anxiety/Depression  -continue home paxil  -trazadone PRN     HCM  -TSH, lipid panel, HbA1C ordered     Diet: Renal  DVT PPX: full dose anticoagulated for possible PE  Code: Full  Dispo: pending clinical improvement, cards recs    Nicolas Berrios  [facility] HO-1  [facility] Medicine Service Team B    [facility] Medicine  Hospitalist Pager numbers:   \[facility]\"" Hospitalist Medicine Team A (Mitzy/Benjamin): [phone]  \[facility]\"" Hospitalist Medicine Team B (Aubrie/Judit):  [phone]

## 2017-12-14 NOTE — PLAN OF CARE
Problem: Patient Care Overview  Goal: Plan of Care Review  Patient received on    3Lpm NC with SpO2    96%. Pt with no apparent distress noted. Will continue to monitor.

## 2017-12-14 NOTE — PROGRESS NOTES
.Pharmacy New Medication Education    Patient accepted medication education.    Pharmacy educated patient on name and purpose of medications and possible side effects, using the teach-back method.     Duoneb  Breo  Heparin  Percocet  Paxil  trazodone    Learners of pharmacy medication education included:  Patient    Patient +/- learner response:  Verbalized Understanding, Teachback

## 2017-12-14 NOTE — TELEPHONE ENCOUNTER
Informed pharmacistCresencio that Dr. Martínez gave few pills for one time. She can fill it and inform the pt to not mix with other medications.

## 2017-12-14 NOTE — ED NOTES
Patient identifiers verified and correct for Katie Quevedo.    LOC: The patient is awake, alert and aware of environment with an appropriate affect, the patient is oriented x 3 and speaking appropriately.  APPEARANCE: Patient resting comfortably and in no acute distress, patient is clean and well groomed, patient's clothing is properly fastened.  SKIN: The skin is warm and dry, color consistent with ethnicity, patient has normal skin turgor and moist mucus membranes, skin intact, no breakdown or bruising noted.  MUSCULOSKELETAL: Patient moving all extremities spontaneously, no obvious swelling or deformities noted.  RESPIRATORY: Airway is open and patent, respirations are spontaneous, patient has a normal effort and rate, no accessory muscle use noted, bilateral breath sounds diminished. Shortness of breath on exertion.  CARDIAC: Patient has a normal rate and regular rhythm, no periphreal edema noted, capillary refill < 3 seconds.  Denies shortness of breath.  ABDOMEN: Soft and non tender to palpation, no distention noted, normoactive bowel sounds present in all four quadrants.  NEUROLOGIC: PERRL, 4mm bilaterally, eyes open spontaneously, behavior appropriate to situation, follows commands, facial expression symmetrical, bilateral hand grasp equal and even, purposeful motor response noted, normal sensation in all extremities when touched with a finger.

## 2017-12-14 NOTE — ED NOTES
Awake and alert, oriented x 4.  resp labored with accessory muscle use noted.  RR 22, O2 sat 100% on 3 l/m per Nc, lungs noted with infrequent expiratory wheezes throughout.  abd soft and non tender.  BS + to all quadrants.  No swelling noted to legs or feet.  Pt denies pain at this time.

## 2017-12-14 NOTE — CONSULTS
[facility]  Cardiology  Consult Note    Patient Name: Katie Quevedo  MRN: [id number]  Admission Date: 12/13/2017  Hospital Length of Stay: 1 days  Code Status: Full Code   Attending Provider: Trevor Spain MD   Consulting Provider: HARPREET Doran, ANP  Primary Care Physician: Kingston Verduzco MD  Principal Problem:Pulmonary embolus    Patient information was obtained from patient and past medical records.     Inpatient consult to [facility]  Consult performed by: JH COLLAZO  Consult ordered by: JESSICA PANCHAL  Reason for consult: SOB; PE         Subjective:     Chief Complaint:  SOB      HPI:   71yo female with history of COPD, melanoma and CKD stage III who presents to the ER with complaints of SOB for the past 3 months. She complains of SOB with exertion with progressive worsening recently. Initially it started with minimal activity ie walking room to room or with turning over in bed. The SOB would relieve with rest after several minutes. She complains of associated symptoms of palpitations and chest pressure/heaviness with the SOB. She describes the palpitations as a racing sensation. Her chest pain is described as midsternal pressure/heaviness not associated with radiation, nausea, vomiting or diaphoresis. Her chest pain and palpitations only occur with SOB and never independently. She denies any orthopnea, PND or edema. At first, she attributed her SOB to her recent diagnosis/treatment of pneumonia or COPD. She reports she has been mainly in bed since September due to her SOB. She denies any cardiac history and denies any previous PCI/CABG     Hospital Course:    12/13/2017 Presented with complaints of SOB for the past few months. Initial troponin .017 and BNP 28. CXR with no significant pulmonary vascular congestion. D- dimer elevated at 2.70. VQ with intermediate probability of PE. BLE venous ultrasound with no evidence of DVT. [facility]  with [facility] and was started on IV Heparin  12/14/2017 Continued SOB with slight improvement. HR, BP and pulse ox stable. Repeat troponin x <.006. Echocardiogram ordered with pending results     Past Medical History:   Diagnosis Date    Anemia     Cataract     Chronic LBP 7/26/2012    Chronic pain     CKD (chronic kidney disease)     COPD (chronic obstructive pulmonary disease)     Dehydration     Encounter for blood transfusion     Lumbar spondylosis     Melanoma     of the lip    Migraines, neuralgic     Osteoporosis     Primary osteoarthritis of both knees     s/p Rt TKA    Seizures     Subdeltoid bursitis, L>R. 9/27/2012    Ulcer     Vitamin D deficiency disease        Past Surgical History:   Procedure Laterality Date    BLADDER SUSPENSION      CATARACT EXTRACTION  11/18/13    left eye    CERVICAL LAMINECTOMY      x3, fusion x1    COLONOSCOPY  2009    HYSTERECTOMY      JOINT REPLACEMENT  2001    total right knee     LUMBAR LAMINECTOMY      x 3, fusion x1       Review of patient's allergies indicates:   Allergen Reactions    Aspirin      Other reaction(s): hx of ulcers    Penicillins      Other reaction(s): Hives  Other reaction(s): Rash  Other reaction(s): Rash  Other reaction(s): Hives    Tetracycline      Other reaction(s): Swelling       No current facility-administered medications on file prior to encounter.      Current Outpatient Prescriptions on File Prior to Encounter   Medication Sig    fluticasone (FLONASE) 50 mcg/actuation nasal spray 1 spray by Each Nare route once daily.    fluticasone-salmeterol 250-50 mcg/dose (ADVAIR) 250-50 mcg/dose diskus inhaler Inhale 1 puff into the lungs 2 (two) times daily.    ipratropium-albuterol (COMBIVENT)  mcg/actuation inhaler Inhale 1 puff into the lungs every 4 (four) hours as needed for Wheezing. Rescue    paroxetine (PAXIL) 30 MG tablet Take 30 mg by mouth once daily. 2 tablets daily at night    tiZANidine (ZANAFLEX) 4 MG tablet  TAKE 1-2 TABLETS BY MOUTH THREE TIMES A DAY AS NEEDED    trazodone (DESYREL) 50 MG tablet TAKE 1-2 TABLETS BY MOUTH NIGHTLY AS NEEDED FOR INSOMNIA    [DISCONTINUED] morphine (MS CONTIN) 15 MG 12 hr tablet Take 1 tablet (15 mg total) by mouth 2 (two) times daily. (Patient taking differently: Take 15 mg by mouth 2 (two) times daily as needed. )    [DISCONTINUED] oxyCODONE-acetaminophen (PERCOCET)  mg per tablet Take 1 tablet by mouth 3 (three) times daily as needed.    diazePAM (VALIUM) 5 MG tablet Take 1 tablet (5 mg total) by mouth daily as needed for Anxiety.    triamcinolone acetonide 0.1% (KENALOG) 0.1 % cream Use hs prn itching     Family History     Problem Relation (Age of Onset)    Arthritis Mother    Cancer Father    Cataracts Father, Sister    Diabetes Maternal Aunt    Glaucoma Cousin    Heart attack Maternal Grandfather    Heart disease Maternal Grandfather    Hypertension Father, Maternal Grandfather    Stroke Mother        Social History Main Topics    Smoking status: Former Smoker     Types: Cigarettes    Smokeless tobacco: Never Used    Alcohol use Yes      Comment: Rare    Drug use: No    Sexual activity: No     Review of Systems   Constitution: Negative for chills, decreased appetite, diaphoresis, fever and weakness.   Cardiovascular: Positive for dyspnea on exertion and palpitations. Negative for chest pain, claudication, cyanosis, irregular heartbeat, leg swelling, near-syncope, orthopnea, paroxysmal nocturnal dyspnea and syncope.   Respiratory: Positive for shortness of breath. Negative for cough, hemoptysis and wheezing.    Gastrointestinal: Negative for bloating, abdominal pain, constipation, diarrhea, melena, nausea and vomiting.   Neurological: Negative for dizziness.     Objective:     Vital Signs (Most Recent):  Temp: 97.9 °F (36.6 °C) (12/14/17 1200)  Pulse: 91 (12/14/17 1200)  Resp: 18 (12/14/17 1200)  BP: (!) 90/50 (12/14/17 1246)  SpO2: 95 % (12/14/17 1211) Vital Signs  (24h Range):  Temp:  [97.5 °F (36.4 °C)-98.2 °F (36.8 °C)] 97.9 °F (36.6 °C)  Pulse:  [] 91  Resp:  [17-28] 18  SpO2:  [95 %-100 %] 95 %  BP: ()/(43-83) 90/50     Weight: 59 kg (130 lb 1.1 oz)  Body mass index is 23.6 kg/m².    SpO2: 95 %  O2 Device (Oxygen Therapy): nasal cannula      Intake/Output Summary (Last 24 hours) at 12/14/17 1402  Last data filed at 12/14/17 0921   Gross per 24 hour   Intake           230.95 ml   Output              900 ml   Net          -669.05 ml       Lines/Drains/Airways     Airway                 Airway - Non-Surgical Nasal Cannula -- days          Peripheral Intravenous Line                 Peripheral IV - Single Lumen 12/13/17 1815 Right Antecubital less than 1 day         Peripheral IV - Single Lumen 12/13/17 1853 Right Antecubital less than 1 day         Peripheral IV - Single Lumen 12/1943 Left Forearm less than 1 day                Physical Exam   Constitutional: She is oriented to person, place, and time. She appears well-developed and well-nourished. No distress.   Cardiovascular: Normal rate and regular rhythm.  Exam reveals no gallop.    No murmur heard.  Pulmonary/Chest: Effort normal and breath sounds normal. No respiratory distress. She has no wheezes.   Abdominal: Soft. Bowel sounds are normal. She exhibits no distension. There is no tenderness.   Neurological: She is alert and oriented to person, place, and time.   Skin: Skin is warm and dry.       Significant Labs:       Recent Labs  Lab 12/13/17  1855   WBC 9.79   RBC 3.85*   HGB 11.5*   HCT 36.1*     278   MCV 94   MCH 29.9   MCHC 31.9*       Recent Labs  Lab 12/14/17  0352      K 4.3      CO2 23   BUN 32*   CREATININE 2.0*   MG 2.0       Significant Imaging: Echocardiogram: 2D echo with color flow doppler: ordered results pending     Assessment and Plan:     * Pulmonary embolus    -VQ scan with intermediate probability  -BLE venous ultrasound with no evidence of DVT  -complaints  of palpitations and chest pain with SOB concerning for PE etiology  -EKG with no acute ischemic changes and troponin negative  -agree with treatment with IV Heparin with plans for transition to oral AC and feel good candidate for NOACs  -considered cardiac etiology as well but less likely given symptoms; echo pending; if LVEF depressed will re evaluate if LVEF normal recommend treatment for PE             VTE Risk Mitigation         Ordered     apixaban tablet 10 mg  2 times daily     Route:  Oral        12/14/17 1126     High Risk of VTE  Once      12/13/17 2307     Reason for no Mechanical VTE Prophylaxis  Once      12/13/17 2307          Thank you for your consult. We will follow from HARPREET [place], WILEY  Cardiology   [facility]

## 2017-12-14 NOTE — NURSING
Notified MD that PCT notified me of /77. Manual taken 90/50, went take BP with machine, 83/53. MD aware of the above. Will continue to monitor.

## 2017-12-14 NOTE — HOSPITAL COURSE
12/13/2017 Presented with complaints of SOB for the past few months. Initial troponin .017 and BNP 28. CXR with no significant pulmonary vascular congestion. D- dimer elevated at 2.70. VQ with intermediate probability of PE. BLE venous ultrasound with no evidence of DVT. [facility] with [facility] and was started on IV Heparin  12/14/2017 Continued SOB with slight improvement. HR, BP and pulse ox stable. Repeat troponin x <.006. Echocardiogram ordered with pending results

## 2017-12-14 NOTE — PLAN OF CARE
Problem: Patient Care Overview  Goal: Plan of Care Review  Outcome: Ongoing (interventions implemented as appropriate)  Admitted with PE.  Currently on a heparin drip, oxygen, resp treatments.  BSC chair provided to help with shortness of breath when out of bed.  Fall precautions explained and maintained.  Telemetry with NSR.  Pain controlled with medications.  Will monitor VS, telemetry, labs and medications as ordered.

## 2017-12-14 NOTE — H&P
"[facility] Internal Medicine History and Physical - Resident Note    Admitting Team: Medicine Team B  Attending Physician: Judit  Resident: Ramonita Beltran  Interns: Ryland    Date of Admit: 12/13/2017    Chief Complaint     Shortness of breath x months    Subjective:      History of Present Illness:  Ms. Quevedo is a 71 y/o F w/ PMH of COPD, CKD3, chronic pain, hx of melanoma, osteoporosis and anemia who presented to [facility] with shortness of breath. Patient was seen in clinic today by Dr. Martínez. Patient was noted to be short of breath at that time. Dr. Martínez ordered a d-dimer which was elevated. A follow-up V\Q scan showed concern for PE so she was told to go to the ED.     Patient states that ever since being discharged from the hospital in September for CAP/COPD exacerbation, she has been short of breath. At first she was only short of breath with exertion, but over the past few weeks it has progressed to the point where she is short of breath at rest or with very minimal exertion. She has been taking her advair appropriately over this time and endorses having to use her rescue inhaler roughly twice per day. She endorses a chronic cough, typically productive of yellowish sputum. Over the past 3 days she denies any cough. She denies hemoptysis. She does endorse very little activity recently due to the SOB and has been primarily confined to her bed. She denies recent travel or surgeries. She denies PND and has zero pillow orthopnea. She denies tenderness or swelling in her legs. Denies chest pain, pleuritic pain, dizziness/lightheadedness.     On ROS, patient denies diarrhea or dysuria; however she did state that it "feels like I am coming down with a kidney infection" due to some mild flank pain. Patient has a history of UTIs. Patient also mentioned having palpitations during this three month period. Wore a 24-hour holter monitor which showed no evidence of arrhythmias.      Past Medical " History:  Past Medical History:   Diagnosis Date    Anemia     Cataract     Chronic LBP 7/26/2012    Chronic pain     CKD (chronic kidney disease)     COPD (chronic obstructive pulmonary disease)     Dehydration     Encounter for blood transfusion     Lumbar spondylosis     Melanoma     of the lip    Migraines, neuralgic     Osteoporosis     Primary osteoarthritis of both knees     s/p Rt TKA    Seizures     Subdeltoid bursitis, L>R. 9/27/2012    Ulcer     Vitamin D deficiency disease        Past Surgical History:  Past Surgical History:   Procedure Laterality Date    BLADDER SUSPENSION      CATARACT EXTRACTION  11/18/13    left eye    CERVICAL LAMINECTOMY      x3, fusion x1    COLONOSCOPY  2009    HYSTERECTOMY      JOINT REPLACEMENT  2001    total right knee     LUMBAR LAMINECTOMY      x 3, fusion x1       Allergies:  Review of patient's allergies indicates:   Allergen Reactions    Aspirin      Other reaction(s): hx of ulcers    Penicillins      Other reaction(s): Hives  Other reaction(s): Rash  Other reaction(s): Rash  Other reaction(s): Hives    Tetracycline      Other reaction(s): Swelling       Home Medications:  Prior to Admission medications    Medication Sig Start Date End Date Taking? Authorizing Provider   diazePAM (VALIUM) 5 MG tablet Take 1 tablet (5 mg total) by mouth daily as needed for Anxiety. 12/13/17 1/12/18  Sonu Martínez MD   fluticasone (FLONASE) 50 mcg/actuation nasal spray 1 spray by Each Nare route once daily. 10/19/17   Ana Baldwin MD   fluticasone-salmeterol 250-50 mcg/dose (ADVAIR) 250-50 mcg/dose diskus inhaler Inhale 1 puff into the lungs 2 (two) times daily. 9/24/17   Bridget Nicole MD   ipratropium-albuterol (COMBIVENT)  mcg/actuation inhaler Inhale 1 puff into the lungs every 4 (four) hours as needed for Wheezing. Rescue 9/24/17   Bridget Nicole MD   morphine (MS CONTIN) 15 MG 12 hr tablet Take 1 tablet (15 mg total) by mouth 2 (two) times  daily. 17  Pushpa Mcmahon MD   oxyCODONE-acetaminophen (PERCOCET)  mg per tablet Take 1 tablet by mouth 3 (three) times daily as needed. 12/15/17 1/14/18  Pushpa Mcmahon MD   paroxetine (PAXIL) 30 MG tablet Take 30 mg by mouth 2 (two) times daily.    Historical Provider, MD   tiZANidine (ZANAFLEX) 4 MG tablet TAKE 1-2 TABLETS BY MOUTH THREE TIMES A DAY AS NEEDED 17   Pushpa Mcmahon MD   trazodone (DESYREL) 50 MG tablet TAKE 1-2 TABLETS BY MOUTH NIGHTLY AS NEEDED FOR INSOMNIA 17   Pushpa Mcmahon MD   triamcinolone acetonide 0.1% (KENALOG) 0.1 % cream Use hs prn itching 3/2/16   Betty Méndez MD       Family History:  Family History   Problem Relation Age of Onset    Arthritis Mother     Stroke Mother     Hypertension Father     Cancer Father     Cataracts Father     Diabetes Maternal Aunt     Hypertension Maternal Grandfather     Heart disease Maternal Grandfather     Heart attack Maternal Grandfather     Cataracts Sister     Glaucoma Cousin     Amblyopia Neg Hx     Blindness Neg Hx     Macular degeneration Neg Hx     Retinal detachment Neg Hx     Strabismus Neg Hx        Social History:  Social History   Substance Use Topics    Smoking status: Former Smoker     Types: Cigarettes    Smokeless tobacco: Former User    Alcohol use Yes      Comment: Rare       Review of Systems:  Pertinent positives and negatives are listed in HPI.  All other systems are reviewed and are negative.    Health Maintaince :   Primary Care Physician: Mauricio  Immunizations:   TDap is up to date.  Influenza is up to date.  Pneumovax is up to date..  Cancer Screening:  PAP: is not up to date.  Mammogram: is not up to date.  Colonoscopy: is up to date.     Objective:   Last 24 Hour Vital Signs:  BP  Min: 101/68  Max: 164/83  Temp  Av.1 °F (36.7 °C)  Min: 98.1 °F (36.7 °C)  Max: 98.1 °F (36.7 °C)  Pulse  Av.3  Min: 81  Max: 107  Resp  Av.5  Min: 19  Max: 28  SpO2   "Av.7 %  Min: 95 %  Max: 100 %  Height  Av' 2.1" (157.7 cm)  Min: 5' 2" (157.5 cm)  Max: 5' 2.25" (158.1 cm)  Weight  Av.9 kg (127 lb 8.7 oz)  Min: 57.6 kg (127 lb)  Max: 58.1 kg (128 lb 1.4 oz)  Body mass index is 23.04 kg/m².  No intake/output data recorded.    Physical Examination:  General: Alert and awake in NAD, on 2L NC  Head:  Normocephalic and atraumatic, JVP ~ 8cm  Eyes:  PERRL; EOMi with anicteric sclera and clear conjunctivae  Mouth:  Oropharynx clear and without exudate; moist mucous membranes  Cardio:  Regular rate and rhythm with normal S1 and S2; no murmurs or rubs appreciated  Resp:  Poor air movment diffusely; mild end expiratory wheezes; no crackles or rhonchi appreciated  Abdom: Soft, NTND with normoactive bowel sounds  Extrem: WWP with no clubbing, cyanosis or edema. Candido's sign negative. No calf tenderness. No erythema or swelling.  Skin:  No rashes, lesions, or color changes  Pulses: 2+ and symmetric distally  Neuro:  AAOx3; cooperative and pleasant with no focal deficits    Laboratory:  Most Recent Data:  CBC: Lab Results   Component Value Date    WBC 9.79 2017    HGB 11.5 (L) 2017    HCT 36.1 (L) 2017     2017     2017    MCV 94 2017    RDW 13.9 2017     BMP: Lab Results   Component Value Date     2017    K 5.1 2017     2017    CO2 24 2017    BUN 27 (H) 2017    CREATININE 2.0 (H) 2017    GLU 93 2017    CALCIUM 9.7 2017    MG 1.9 10/18/2017    PHOS 3.6 10/18/2017     LFTs: Lab Results   Component Value Date    PROT 7.5 2017    ALBUMIN 3.7 2017    BILITOT 0.4 2017    AST 20 2017    ALKPHOS 79 2017    ALT 9 (L) 2017     Coags:   Lab Results   Component Value Date    INR 0.9 2017     Urinalysis: Lab Results   Component Value Date    LABURIN No growth 10/18/2017    COLORU Yellow 10/18/2017    SPECGRAV 1.010 10/18/2017    " NITRITE Negative 10/18/2017    PROTEINUR trace 10/08/2014    KETONESU Negative 10/18/2017    UROBILINOGEN Negative 10/18/2017    BILIRUBINUR negative 10/08/2014    WBCUA 2 10/18/2017       Trended Lab Data:    Recent Labs  Lab 12/13/17  1138 12/13/17  1855   WBC 9.19 9.79   HGB 11.5* 11.5*   HCT 36.0* 36.1*    278  278   MCV 94 94   RDW 13.9 13.9     --    K 5.1  --      --    CO2 24  --    BUN 27*  --    CREATININE 2.0*  --    GLU 93  --    PROT 7.5  --    ALBUMIN 3.7  --    BILITOT 0.4  --    AST 20  --    ALKPHOS 79  --    ALT 9*  --        Trended Cardiac Data:    Recent Labs  Lab 12/13/17  1907   TROPONINI 0.017       Microbiology Data:  None    Other Laboratory Data:      Other Results:  EKG (my interpretation): NSR    Radiology:  Imaging Results    None            Assessment:     Katie Quevedo is a 72 y.o. female with:     Plan:     Pulmonary Embolism   -patient with chronic SOB for approximately 3 months  has progressively worsened over this period  -currently has SOB at rest, can walk only a few steps before needing to catch breath  -was seen by PCP in clinic today  D-dimer elevated (2.70)  PCP ordered V/Q scan which showed multiple peripheral perfusion abnormalities representing an intermediate probability of PE  -patient has been inactive over the last few weeks, staying in bed most of the day  -no previous hx of PE/DVT  no calf swelling or tenderness  no cough or hemoptysis  no pleuritic chest pain  -PESI score = 114 (hx of melanoma, COPD)  -started on heparin drip for anticoagulation  -EKG showing NSR  will trend  -Troponin 0.17  will trend  -Ordered echo, LE ultrasound, CXR, ABG, BNP  -cardiology consulted    CKD3  -Cr: 2.0  baseline  -will monitor    Normocytic Anemia  -H/H 11.5/36.1, MCV 94 (baseline hemoglobin documented to be ~8.5)  -iron studies from September consistent with AoCD  -monitor    COPD  -on combivent PRN and advair daily  -states she uses her rescue inhaler  about twice a day  -no increased sputum production recently  no fevers/chills  -duonebs q4h PRN    Chronic Pain  -hx of multiple back surgeries and neck surgeries  -on percocet, MS contin and zanaflex at home  -will order percocet PRN    Anxiety/Depression  -continue home paxil    HCM  -TSH, lipid panel, HbA1C ordered    Diet: Renal  DVT PPX: full dose anticoagulated for possible PE  Code: Full  Dispo: pending clinical improvement, cards recs    Code Status:     Full    Nicolas Berrios  [facility] Internal Medicine HO-I  U Medicine Service    [facility] Medicine Hospitalist Pager numbers:   [facility] Hospitalist Medicine Team A (Mitzy/Benjamin): [phone]  [facility] Hospitalist Medicine Team B (Aubrie/Judit):  [phone]

## 2017-12-15 ENCOUNTER — TELEPHONE (OUTPATIENT)
Dept: CARDIOLOGY | Facility: CLINIC | Age: 72
End: 2017-12-15

## 2017-12-15 LAB
DIASTOLIC DYSFUNCTION: YES
ESTIMATED PA SYSTOLIC PRESSURE: 15.25
MITRAL VALVE MOBILITY: NORMAL
RETIRED EF AND QEF - SEE NOTES: 60 (ref 55–65)

## 2017-12-15 NOTE — PLAN OF CARE
Per patient, would like to be called at the following numbers concerning results of her echocardiogram.     Home: [phone]  Cell: [phone]    James Paul MD  [facility] Internal Medicine [facility]

## 2017-12-15 NOTE — PLAN OF CARE
Spoke to Ms Quevedo over the phone and informed her that her echo read did not reveal any evidence of right heart strain or cor pulmonale.    Nicolas Berrios MD  [facility] Medicine  12:46 12/15/2017

## 2017-12-15 NOTE — PLAN OF CARE
Problem: Patient Care Overview  Goal: Plan of Care Review  Outcome: Ongoing (interventions implemented as appropriate)  Plan of care reviewed with pt. Pt verbalized understanding. Heparin drip discontinued during shift and pt started on apixaban. Pt on tele: SB-NSR, HR: 50s-80s. No red alarms noted as of this time. 2D echo done during shift. Safety measures maintained. Bed is in the lowest position and locked. Call bell is within reach. Instructed pt to call for assistance. Will continue to monitor.

## 2017-12-15 NOTE — TELEPHONE ENCOUNTER
----- Message from Virginia Schmitt RN sent at 12/14/2017  4:44 PM CST -----  Please schedule hospital discharge followup 1-2 weeks and send patient followup appt.    Thanks,  Virginia Schmitt, RN, [facility], CMSRN  RN Transition Navigator  [phone]

## 2017-12-15 NOTE — TELEPHONE ENCOUNTER
Aldo Hobson asked if you could contact patient to schedule her hospital follow up. He said 1-2 weeks but she has transportation issues.

## 2017-12-15 NOTE — PLAN OF CARE
Future Appointments  Date Time Provider Department Center   1/3/2018 1:00 PM Clemencia Gambino MD [facility] IM [facility]   4/9/2018 2:20 PM Pushpa Mcmahon MD [facility]     TN spoke with Adele at Cardiology, she is going to call the patient to set a hospital follow up appt with Dr. Morrow, patient was dc'd late yesterday.  TN notified ALEJANDRO Dixon that patient had questions regarding her echo.     12/15/17 0979   Final Note   Assessment Type Final Discharge Note   Discharge Disposition Home   What phone number can be called within the next 1-3 days to see how you are doing after discharge? [phone]   Hospital Follow Up  Appt(s) scheduled? Yes   Discharge plans and expectations educations in teach back method with documentation complete? Yes   Right Care Referral Info   Post Acute Recommendation No Care

## 2017-12-15 NOTE — NURSING
Patient discharge instructions given and reviewed. Med rec reviewed with Patient. Patient verbalized understanding. Education provided on new medication and diagnosis. PIV d/carson tip intact. Pt tolerated well. Tele box returned to nurses station. Awaiting transportation home.

## 2017-12-20 NOTE — DISCHARGE SUMMARY
[facility] IM Discharge Summary    Primary Team: [facility] Team B  Attending Physician: Judit  Resident: Ramonita Beltran  Intern: Nicolas Berrios    Date of Admit: 12/13/2017  Date of Discharge: 12/14/2017  Discharge to: Home  Condition: Stable    Discharge Diagnoses     Patient Active Problem List   Diagnosis    Melanoma    Chronic pain    Lumbar spondylosis    Vitamin deficiency    Cervical post-laminectomy syndrome    Lumbar postlaminectomy syndrome    Chronic neck pain    Lumbosacral spondylosis without myelopathy    Isolated cervical dystonia    Primary osteoarthritis of both knees    Subdeltoid bursitis, L>R.    Other fragments of torsion dystonia    Nuclear sclerosis - Both Eyes    Rotator cuff tear, right    Post-operative state    Muscle spasm    Intractable migraine    Biceps tendonitis    Kidney disease, chronic, stage III (GFR 30-59 ml/min)    COPD (chronic obstructive pulmonary disease) with chronic bronchitis    Osteoarthritis, hip, bilateral    Lumbar radiculopathy, BLE    Cervical radiculopathy, BUE    Osteoporosis    Chronic low back pain    Pruritus    COPD exacerbation    Hypoxia    Tachycardia    Pulmonary embolus       Consultants and Procedures     Consultants:  Cardiology    Procedures:   None    Brief History of Present Illness      Ms. Quevedo is a 71 y/o F w/ PMH of COPD, CKD3, chronic pain, hx of melanoma, osteoporosis and anemia who presented to [facility] with shortness of breath. Patient was seen in clinic on the day of admit by Dr. Martínez. Patient was noted to be short of breath at that time. Dr. Martínez ordered a d-dimer which was elevated. A follow-up V\Q scan showed concern for PE so she was told to go to the ED.      Patient stated that ever since being discharged from the hospital in September for CAP/COPD exacerbation, she has been short of breath. At first she was only short of breath with exertion, but over the past few weeks it has progressed to the point where  "she is short of breath at rest or with very minimal exertion. She has been taking her advair appropriately over this time and endorsed having to use her rescue inhaler roughly twice per day. She endorsed a chronic cough, typically productive of yellowish sputum. Over the past 3 days PTA she denied any cough. She denied hemoptysis. She did endorse very little activity recently due to the SOB and has been primarily confined to her bed. She denied recent travel or surgeries. She denied PND and has zero pillow orthopnea. She denied tenderness or swelling in her legs. Denied chest pain, pleuritic pain, dizziness/lightheadedness.      On ROS, patient denied diarrhea or dysuria; however she did state that it "feels like I am coming down with a kidney infection" due to some mild flank pain. Patient has a history of UTIs. Patient also mentioned having palpitations during the last three months. Wore a 24-hour holter monitor which showed no evidence of arrhythmias.    For the full HPI please refer to the History & Physical from this admission.    Hospital Course By Problem with Pertinent Findings     Pulmonary Embolism   -patient with chronic SOB for approximately 3 months  has progressively worsened over this period  -currently has SOB at rest, can walk only a few steps before needing to catch breath  -was seen by PCP in clinic before admit  D-dimer elevated (2.70)  PCP ordered V/Q scan which showed multiple peripheral perfusion abnormalities representing an intermediate probability of PE  CT chest w/out contrast showing several pulmonary nodules up to 5mm in size  -patient has been inactive over the last few weeks, staying in bed most of the day  -no previous hx of PE/DVT  no calf swelling or tenderness  no cough or hemoptysis  no pleuritic chest pain  -PESI score = 114 (hx of melanoma, COPD)  -started on heparin drip for anticoagulation  -EKGs showing NSR  -Troponin 0.17 --> <0.006  no chest pain  -Ordered echoRIVERA " ultrasound, CXR, ABG, BNP  -ABG 7.315/43.9/56/22.4 FiO2 0.30  A-a gradient: 115  -CXR normal  LE ultrasound negative for DVT  BNP 28  -echo without evidence of R heart strain and normal LV systolic function (EF 60-65%)  grade 1 diastolic dysfunction  -cardiology consulted. Recommended NOAC at discharge and cardiology outpatient follow-up.  -patient counseled to take eliquis 10mg bid for 7 days then eliquis 5mg bid for the next 6 months.  -patient to have outpatient CT chest in 12 months to follow-up pulmonary nodules      CKD3  -Cr: 2.0  baseline     Normocytic Anemia  -H/H 11.5/36.1, MCV 94 (baseline hemoglobin documented to be ~8.5)  -iron studies from September consistent with AoCD     COPD  -on combivent PRN and advair daily  -states she uses her rescue inhaler about twice a day  -no increased sputum production recently  no fevers/chills  -duonebs q4h PRN     Chronic Pain  -hx of multiple back surgeries and neck surgeries  -on percocet, MS contin and zanaflex at home  -continued percocet PRN     Anxiety/Depression  -continued home paxil  -trazadone PRN     HCM  -TSH, lipid panel, HbA1C ordered but never collected.  -Recommend drawing as outpatient with PCP if indicated    Discharge Medications        Medication List      START taking these medications    * apixaban 5 mg Tab  Take 1 tablet (5 mg total) by mouth 2 (two) times daily.     * apixaban 5 mg Tab  Take 2 tablets twice a day for 7 days, then take 1 tablet twice a day        * This list has 2 medication(s) that are the same as other medications prescribed for you. Read the directions carefully, and ask your doctor or other care provider to review them with you.            CONTINUE taking these medications    diazePAM 5 MG tablet  Commonly known as:  VALIUM  Take 1 tablet (5 mg total) by mouth daily as needed for Anxiety.     fluticasone 50 mcg/actuation nasal spray  Commonly known as:  FLONASE  1 spray by Each Nare route once daily.      fluticasone-salmeterol 250-50 mcg/dose 250-50 mcg/dose diskus inhaler  Commonly known as:  ADVAIR  Inhale 1 puff into the lungs 2 (two) times daily.     ipratropium-albuterol  mcg/actuation inhaler  Commonly known as:  COMBIVENT  Inhale 1 puff into the lungs every 4 (four) hours as needed for Wheezing. Rescue     paroxetine 30 MG tablet  Commonly known as:  PAXIL     tiZANidine 4 MG tablet  Commonly known as:  ZANAFLEX  TAKE 1-2 TABLETS BY MOUTH THREE TIMES A DAY AS NEEDED     traZODone 50 MG tablet  Commonly known as:  DESYREL  TAKE 1-2 TABLETS BY MOUTH NIGHTLY AS NEEDED FOR INSOMNIA     triamcinolone acetonide 0.1% 0.1 % cream  Commonly known as:  KENALOG  Use hs prn itching        STOP taking these medications    morphine 15 MG 12 hr tablet  Commonly known as:  MS CONTIN     oxyCODONE-acetaminophen  mg per tablet  Commonly known as:  PERCOCET           Where to Get Your Medications      You can get these medications from any pharmacy    Bring a paper prescription for each of these medications  · apixaban 5 mg Tab  · apixaban 5 mg Tab         Discharge Information:   Diet:  Renal    Physical Activity:  As tolerated    Instructions:  1. Take all medications as prescribed  2. Keep all follow-up appointments  3. Return to the hospital or call your primary care physicians if any worsening symptoms such as chest pain, shortness of breath, fever/chills, confusion, loss of consciousness occur.    Follow-Up Appointments:  Future Appointments  Date Time Provider Department Center   12/26/2017 2:00 PM HRA, KNR 7 [facility] MED [place]   1/3/2018 1:00 PM Clemencia Gambino MD [facility] IM [facility] [place] [facility]   1/4/2018 10:00 AM Jin Morrow MD [facility] CARDIO [place] [facility]   4/9/2018 2:20 PM Pushpa Mcmahon MD [facility]         Nicolas Berrios  \[facility]\"" Internal Medicine, -1

## 2018-01-15 DIAGNOSIS — M54.2 CHRONIC NECK PAIN: ICD-10-CM

## 2018-01-15 DIAGNOSIS — G89.29 CHRONIC NECK PAIN: ICD-10-CM

## 2018-01-15 RX ORDER — HYDROCODONE BITARTRATE AND ACETAMINOPHEN 10; 325 MG/1; MG/1
1 TABLET ORAL 3 TIMES DAILY PRN
Qty: 90 TABLET | Refills: 0 | Status: SHIPPED | OUTPATIENT
Start: 2018-01-18 | End: 2018-01-28

## 2018-01-22 ENCOUNTER — LAB VISIT (OUTPATIENT)
Dept: LAB | Facility: HOSPITAL | Age: 73
End: 2018-01-22
Attending: INTERNAL MEDICINE
Payer: MEDICARE

## 2018-01-22 ENCOUNTER — OFFICE VISIT (OUTPATIENT)
Dept: PRIMARY CARE CLINIC | Facility: CLINIC | Age: 73
End: 2018-01-22
Payer: MEDICARE

## 2018-01-22 VITALS
TEMPERATURE: 98 F | OXYGEN SATURATION: 95 % | BODY MASS INDEX: 23.18 KG/M2 | DIASTOLIC BLOOD PRESSURE: 81 MMHG | HEART RATE: 100 BPM | SYSTOLIC BLOOD PRESSURE: 159 MMHG | WEIGHT: 127.75 LBS

## 2018-01-22 DIAGNOSIS — R63.1 POLYDIPSIA: ICD-10-CM

## 2018-01-22 DIAGNOSIS — N18.30 KIDNEY DISEASE, CHRONIC, STAGE III (GFR 30-59 ML/MIN): ICD-10-CM

## 2018-01-22 DIAGNOSIS — Z11.4 ENCOUNTER FOR SCREENING FOR HIV: ICD-10-CM

## 2018-01-22 DIAGNOSIS — I26.99 OTHER ACUTE PULMONARY EMBOLISM WITHOUT ACUTE COR PULMONALE: Primary | ICD-10-CM

## 2018-01-22 DIAGNOSIS — H04.129 DRYNESS, EYE: ICD-10-CM

## 2018-01-22 DIAGNOSIS — I10 ESSENTIAL HYPERTENSION: ICD-10-CM

## 2018-01-22 DIAGNOSIS — R94.6 ABNORMAL RESULTS OF THYROID FUNCTION STUDIES: ICD-10-CM

## 2018-01-22 DIAGNOSIS — Z12.31 ENCOUNTER FOR SCREENING MAMMOGRAM FOR BREAST CANCER: ICD-10-CM

## 2018-01-22 DIAGNOSIS — D50.9 IRON DEFICIENCY ANEMIA, UNSPECIFIED IRON DEFICIENCY ANEMIA TYPE: ICD-10-CM

## 2018-01-22 DIAGNOSIS — F41.9 ANXIETY: ICD-10-CM

## 2018-01-22 DIAGNOSIS — J44.89 COPD (CHRONIC OBSTRUCTIVE PULMONARY DISEASE) WITH CHRONIC BRONCHITIS: Chronic | ICD-10-CM

## 2018-01-22 DIAGNOSIS — R79.9 ABNORMAL FINDING OF BLOOD CHEMISTRY: ICD-10-CM

## 2018-01-22 LAB
ANION GAP SERPL CALC-SCNC: 11 MMOL/L
BASOPHILS # BLD AUTO: 0.06 K/UL
BASOPHILS NFR BLD: 0.5 %
BUN SERPL-MCNC: 32 MG/DL
CALCIUM SERPL-MCNC: 10.2 MG/DL
CHLORIDE SERPL-SCNC: 100 MMOL/L
CO2 SERPL-SCNC: 25 MMOL/L
CREAT SERPL-MCNC: 2 MG/DL
DIFFERENTIAL METHOD: ABNORMAL
EOSINOPHIL # BLD AUTO: 0.6 K/UL
EOSINOPHIL NFR BLD: 5.2 %
ERYTHROCYTE [DISTWIDTH] IN BLOOD BY AUTOMATED COUNT: 13.7 %
EST. GFR  (AFRICAN AMERICAN): 28 ML/MIN/1.73 M^2
EST. GFR  (NON AFRICAN AMERICAN): 24 ML/MIN/1.73 M^2
ESTIMATED AVG GLUCOSE: 103 MG/DL
GLUCOSE SERPL-MCNC: 80 MG/DL
HBA1C MFR BLD HPLC: 5.2 %
HCT VFR BLD AUTO: 36.3 %
HGB BLD-MCNC: 11.7 G/DL
IRON SERPL-MCNC: 41 UG/DL
LYMPHOCYTES # BLD AUTO: 1.9 K/UL
LYMPHOCYTES NFR BLD: 15.7 %
MCH RBC QN AUTO: 29.8 PG
MCHC RBC AUTO-ENTMCNC: 32.2 G/DL
MCV RBC AUTO: 93 FL
MONOCYTES # BLD AUTO: 1 K/UL
MONOCYTES NFR BLD: 8.6 %
NEUTROPHILS # BLD AUTO: 8.4 K/UL
NEUTROPHILS NFR BLD: 69.8 %
PLATELET # BLD AUTO: 301 K/UL
PMV BLD AUTO: 10.1 FL
POTASSIUM SERPL-SCNC: 4.3 MMOL/L
RBC # BLD AUTO: 3.92 M/UL
SATURATED IRON: 8 %
SODIUM SERPL-SCNC: 136 MMOL/L
TOTAL IRON BINDING CAPACITY: 521 UG/DL
TRANSFERRIN SERPL-MCNC: 352 MG/DL
TSH SERPL DL<=0.005 MIU/L-ACNC: 2.69 UIU/ML
WBC # BLD AUTO: 12.07 K/UL

## 2018-01-22 PROCEDURE — 83036 HEMOGLOBIN GLYCOSYLATED A1C: CPT

## 2018-01-22 PROCEDURE — 36415 COLL VENOUS BLD VENIPUNCTURE: CPT

## 2018-01-22 PROCEDURE — 85025 COMPLETE CBC W/AUTO DIFF WBC: CPT

## 2018-01-22 PROCEDURE — 84443 ASSAY THYROID STIM HORMONE: CPT

## 2018-01-22 PROCEDURE — 80074 ACUTE HEPATITIS PANEL: CPT

## 2018-01-22 PROCEDURE — 83540 ASSAY OF IRON: CPT

## 2018-01-22 PROCEDURE — 99215 OFFICE O/P EST HI 40 MIN: CPT | Mod: S$GLB,,, | Performed by: INTERNAL MEDICINE

## 2018-01-22 PROCEDURE — 99999 PR PBB SHADOW E&M-EST. PATIENT-LVL V: CPT | Mod: PBBFAC,,, | Performed by: INTERNAL MEDICINE

## 2018-01-22 PROCEDURE — 80048 BASIC METABOLIC PNL TOTAL CA: CPT

## 2018-01-22 PROCEDURE — 99499 UNLISTED E&M SERVICE: CPT | Mod: S$GLB,,, | Performed by: INTERNAL MEDICINE

## 2018-01-22 PROCEDURE — 86703 HIV-1/HIV-2 1 RESULT ANTBDY: CPT

## 2018-01-22 RX ORDER — DIAZEPAM 5 MG/1
5 TABLET ORAL DAILY PRN
Qty: 20 TABLET | Refills: 0 | Status: SHIPPED | OUTPATIENT
Start: 2018-01-22 | End: 2018-07-31

## 2018-01-22 NOTE — PROGRESS NOTES
[facility]  New Visit Progress Note   Recent Hospital Discharge     PRESENTING HISTORY     Chief Complaint/Reason for Admission:  Follow up Hospital Discharge     PCP: Kingston Verduzco MD    History of Present Illness:  Ms. Katie Quevedo is a 72 y.o. female who was recently admitted to the hospital.    ___________________________________________________________________    Today:  [facility] to [facility] for [facility] follow up.  Recently hospitalized for Pulmonary embolus, seemingly unprovoked.   Anticoagulation initiated with Eliquis.  Patient discharged to home.     Patient tells me she continues to have dyspnea with minimal exertion.  Has a pulse oximeter at home and oxygen saturations have been in good range, even when she is short of breath.  No specific chest pain, no cough, no hemoptysis.    History reviewed.  Patient due for mammogram and colonoscopy.  Had ANIVAL years ago and no longer gets screening Pap smears.  Has CKD but has not had nephrology evaluation.   Reports a history radiation to the thyroid as a  for an unclear thyroid disorder.   Patient see's a community psychiatrist, Dr Mckenna, for anxiety.  Had melanoma to left side, upper lip, . Underwent surgical excision.       Review of Systems  General ROS: negative for chills, fever or weight loss  Psychological ROS: negative for hallucination, depression or suicidal ideation  + anxiety  Ophthalmic ROS: negative for blurry vision, photophobia or eye pain  + dry, irritated, red eyes X 3 months   ENT ROS: negative for epistaxis, sore throat or rhinorrhea  Respiratory ROS: no cough,  Wheezing +shortness of breath,  Cardiovascular ROS: no chest pain + dyspnea on exertion  Gastrointestinal ROS: no abdominal pain, change in bowel habits, or black/ bloody stools  Genito-Urinary ROS: no dysuria, trouble voiding, or hematuria  Musculoskeletal ROS: negative for gait disturbance or muscular weakness  Neurological ROS: no syncope or seizures; no  ataxia  Dermatological ROS: negative for pruritis, rash and jaundice      PAST HISTORY:     Past Medical History:   Diagnosis Date    Anemia     Cataract     Chronic LBP 7/26/2012    Chronic pain     CKD (chronic kidney disease)     COPD (chronic obstructive pulmonary disease)     Dehydration     Encounter for blood transfusion     Lumbar spondylosis     Melanoma     of the lip    Migraines, neuralgic     Osteoporosis     Primary osteoarthritis of both knees     s/p Rt TKA    Seizures     Subdeltoid bursitis, L>R. 9/27/2012    Ulcer     Vitamin D deficiency disease        Past Surgical History:   Procedure Laterality Date    BLADDER SUSPENSION      CATARACT EXTRACTION  11/18/13    left eye    CERVICAL LAMINECTOMY      x3, fusion x1    COLONOSCOPY  2009    HYSTERECTOMY      JOINT REPLACEMENT  2001    total right knee     LUMBAR LAMINECTOMY      x 3, fusion x1       Family History   Problem Relation Age of Onset    Arthritis Mother     Stroke Mother     Hypertension Father     Cancer Father     Cataracts Father     Diabetes Maternal Aunt     Hypertension Maternal Grandfather     Heart disease Maternal Grandfather     Heart attack Maternal Grandfather     Cataracts Sister     Glaucoma Cousin        Social History     Social History    Marital status:      Spouse name: N/A    Number of children: N/A    Years of education: N/A     Social History Main Topics    Smoking status: Former Smoker     Types: Cigarettes    Smokeless tobacco: Never Used    Alcohol use Yes      Comment: Rare    Drug use: No    Sexual activity: No     Other Topics Concern    None     Social History Narrative    None       MEDICATIONS & ALLERGIES:     Current Outpatient Prescriptions on File Prior to Visit   Medication Sig Dispense Refill    apixaban 5 mg Tab Take 1 tablet (5 mg total) by mouth 2 (two) times daily. 60 tablet 5    fluticasone (FLONASE) 50 mcg/actuation nasal spray 1 spray by Each  Nare route once daily. 1 Bottle 0    fluticasone-salmeterol 250-50 mcg/dose (ADVAIR) 250-50 mcg/dose diskus inhaler Inhale 1 puff into the lungs 2 (two) times daily. 60 each 3    hydrocodone-acetaminophen 10-325mg (NORCO)  mg Tab Take 1 tablet by mouth 3 (three) times daily as needed. 90 tablet 0    ipratropium-albuterol (COMBIVENT)  mcg/actuation inhaler Inhale 1 puff into the lungs every 4 (four) hours as needed for Wheezing. Rescue 1 Package 3    paroxetine (PAXIL) 30 MG tablet Take 30 mg by mouth once daily. 2 tablets daily at night      tiZANidine (ZANAFLEX) 4 MG tablet TAKE 1-2 TABLETS BY MOUTH THREE TIMES A DAY AS NEEDED 120 tablet 1    trazodone (DESYREL) 50 MG tablet TAKE 1-2 TABLETS BY MOUTH NIGHTLY AS NEEDED FOR INSOMNIA 60 tablet 3    apixaban 5 mg Tab Take 2 tablets twice a day for 7 days, then take 1 tablet twice a day 69 tablet 0    diazePAM (VALIUM) 5 MG tablet Take 1 tablet (5 mg total) by mouth daily as needed for Anxiety. 10 tablet 0    triamcinolone acetonide 0.1% (KENALOG) 0.1 % cream Use hs prn itching 454 g 3     No current facility-administered medications on file prior to visit.         Review of patient's allergies indicates:   Allergen Reactions    Aspirin      Other reaction(s): hx of ulcers    Penicillins      Other reaction(s): Hives  Other reaction(s): Rash  Other reaction(s): Rash  Other reaction(s): Hives    Tetracycline      Other reaction(s): Swelling       OBJECTIVE:     Vital Signs:  Vitals:    01/22/18 1321   BP: (!) 159/81   Pulse: 100   Temp: 97.6 °F (36.4 °C)     Wt Readings from Last 1 Encounters:   01/22/18 1321 58 kg (127 lb 12.1 oz)     Body mass index is 23.18 kg/m².   95%    Physical Exam:  BP (!) 159/81 (BP Location: Left arm, Patient Position: Sitting, BP Method: Small (Automatic))   Pulse 100   Temp 97.6 °F (36.4 °C) (Oral)   Wt 58 kg (127 lb 12.1 oz)   SpO2 95%   BMI 23.18 kg/m²   General appearance: alert, cooperative, no  distress  Constitutional:Oriented to person, place, and time +appears well-developed and well-nourished.   HEENT: Normocephalic, atraumatic, neck symmetrical, no nasal discharge   Eyes: + conjunctivae red, tearing, no discharge, PERRL, EOM's intact  Lungs: clear to auscultation bilaterally, no dullness to percussion bilaterally  Heart: + increased rate , normal rhythm without rub; no displacement of the PMI   Abdomen: soft, non-tender; bowel sounds normoactive; no organomegaly  Extremities: extremities symmetric; no clubbing, cyanosis, or edema  Integument: Skin color, texture, turgor normal; no rashes; hair distrubution normal  Neurologic: Alert and oriented X 3, normal strength, normal coordination and gait  Psychiatric: no pressured speech; normal affect; no evidence of impaired cognition     Laboratory  Lab Results   Component Value Date    WBC 12.07 01/22/2018    HGB 11.7 (L) 01/22/2018    HCT 36.3 (L) 01/22/2018    MCV 93 01/22/2018     01/22/2018     BMP  Lab Results   Component Value Date     01/22/2018    K 4.3 01/22/2018     01/22/2018    CO2 25 01/22/2018    BUN 32 (H) 01/22/2018    CREATININE 2.0 (H) 01/22/2018    CALCIUM 10.2 01/22/2018    ANIONGAP 11 01/22/2018    ESTGFRAFRICA 28 (A) 01/22/2018    EGFRNONAA 24 (A) 01/22/2018     Lab Results   Component Value Date    ALT 9 (L) 12/14/2017    AST 17 12/14/2017    ALKPHOS 69 12/14/2017    BILITOT 0.2 12/14/2017     Lab Results   Component Value Date    INR 0.9 12/13/2017    INR 0.9 10/18/2017    INR 1.0 05/13/2012     Lab Results   Component Value Date    HGBA1C 4.7 05/14/2012     No results for input(s): POCTGLUCOSE in the last 72 hours.    Diagnostic Results:  VQ 12/13/17:    Impression : There multiple small peripheral perfusion abnormality.  While this is believed to represent underlying chronic pulmonary disease or an issue with the tracer pulmonary embolus cannot be excluded.  This represents a intermediate probability of pulmonary  embolism.       2 D echo 12/14/17:  CONCLUSIONS     1 - Normal left ventricular systolic function (EF 60-65%).     2 - Impaired LV relaxation, normal LAP (grade 1 diastolic dysfunction).     3 - Normal right ventricular systolic function .     4 - The estimated PA systolic pressure is 15 mmHg.     ASSESSMENT & PLAN:       Other acute pulmonary embolism without acute cor pulmonale  -recent hospitalization for acute PE, diagnosed by VQ scan  - continues to have symptomatic dyspnea, but oxygen saturation stable and patient not in visible respiratory distress  - tolerating anticoagulation with Eliquis  - episode appears to be unprovoked; patient due for multiple age appropriate cancer screenings and due for primary care evaluation    COPD (chronic obstructive pulmonary disease) with chronic bronchitis  - stable on Advair, Combivent    Essential hypertension  - blood pressure not at goal today, but patient tells me this is an outlier  - patient instructed to keep log of home blood pressures for my review    Kidney disease, chronic, stage III (GFR 30-59 ml/min)  -     Basic metabolic panel; Future; Expected date: 01/22/2018  -     HEMOGLOBIN A1C; Future; Expected date: 01/22/2018  -     URINALYSIS; Future; Expected date: 01/22/2018  -     MICROALBUMIN / CREATININE RATIO URINE; Future; Expected date: 01/22/2018  -     HEPATITIS PANEL, ACUTE; Future; Expected date: 01/22/2018  -     HIV-1 and HIV-2 antibodies; Future; Expected date: 01/22/2018  -     Ambulatory Referral to Nephrology    Iron deficiency anemia, unspecified iron deficiency anemia type  -     CBC auto differential; Future; Expected date: 01/22/2018  -     Iron and TIBC; Future; Expected date: 01/22/2018  -     TSH; Future; Expected date: 01/22/2018      Encounter for screening mammogram for breast cancer  -     Mammo Digital Screening Bilateral With CAD; Future; Expected date: 01/22/2018    Dryness, eye  -     Ambulatory Referral to Optometry    Anxiety  -  patient followed by community Psychiatrist, JONNA Doty  - on Paroxetine, trazodone  - patient requesting refill on prn Valium   -     diazePAM (VALIUM) 5 MG tablet; Take 1 tablet (5 mg total) by mouth daily as needed for Anxiety.  Dispense: 20 tablet; Refill: 0      I will [facility] in [facility] 2[facility]6/18, sooner if needed.  Will communicate with GI team regarding colonoscopy timing as she is on anticoagulation.   Instructions for the patient:      Scheduled Follow-up :  Future Appointments  Date Time Provider Department Center   1/22/2018 3:20 PM APPOINTMENT LAB, [facility] REED [facility]   1/22/2018 3:30 PM APPOINTMENT LAB, [facility] MOB [facility]   1/25/2018 1:15 PM Cole Brown OD [facility] OPTOMTY [place]   1/29/2018 12:15 PM [facility] MAMMO1 [facility] MAMMO [place] [facility]   2/6/2018 11:00 AM Clemencia Gambino MD [facility] IM [facility] [place] [facility]   4/9/2018 2:20 PM Pushpa Mcmahon MD [facility]       Post Visit Medication List:     Medication List          Accurate as of 1/22/18 11:59 PM. If you have any questions, ask your nurse or doctor.               CONTINUE taking these medications    * apixaban 5 mg Tab  Take 1 tablet (5 mg total) by mouth 2 (two) times daily.     * apixaban 5 mg Tab  Take 2 tablets twice a day for 7 days, then take 1 tablet twice a day     diazePAM 5 MG tablet  Commonly known as:  VALIUM  Take 1 tablet (5 mg total) by mouth daily as needed for Anxiety.     fluticasone 50 mcg/actuation nasal spray  Commonly known as:  FLONASE  1 spray by Each Nare route once daily.     fluticasone-salmeterol 250-50 mcg/dose 250-50 mcg/dose diskus inhaler  Commonly known as:  ADVAIR  Inhale 1 puff into the lungs 2 (two) times daily.     hydrocodone-acetaminophen 10-325mg  mg Tab  Commonly known as:  NORCO  Take 1 tablet by mouth 3 (three) times daily as needed.     ipratropium-albuterol  mcg/actuation inhaler  Commonly known as:  COMBIVENT  Inhale 1 puff into the lungs  every 4 (four) hours as needed for Wheezing. Rescue     paroxetine 30 MG tablet  Commonly known as:  PAXIL     tiZANidine 4 MG tablet  Commonly known as:  ZANAFLEX  TAKE 1-2 TABLETS BY MOUTH THREE TIMES A DAY AS NEEDED     traZODone 50 MG tablet  Commonly known as:  DESYREL  TAKE 1-2 TABLETS BY MOUTH NIGHTLY AS NEEDED FOR INSOMNIA     triamcinolone acetonide 0.1% 0.1 % cream  Commonly known as:  KENALOG  Use hs prn itching        * This list has 2 medication(s) that are the same as other medications prescribed for you. Read the directions carefully, and ask your doctor or other care provider to review them with you.               Where to Get Your Medications      These medications were sent to [facility] - JULIUS Nielsen - [address]  [address] [place] [address]    Phone:  [phone]   · diazePAM 5 MG tablet         Signing Physician:  Clemencia Gambino MD

## 2018-01-23 DIAGNOSIS — J44.9 COPD (CHRONIC OBSTRUCTIVE PULMONARY DISEASE): Primary | ICD-10-CM

## 2018-01-23 LAB
HAV IGM SERPL QL IA: NEGATIVE
HBV CORE IGM SERPL QL IA: NEGATIVE
HBV SURFACE AG SERPL QL IA: NEGATIVE
HCV AB SERPL QL IA: NEGATIVE
HIV 1+2 AB+HIV1 P24 AG SERPL QL IA: NEGATIVE

## 2018-01-24 ENCOUNTER — OFFICE VISIT (OUTPATIENT)
Dept: NEPHROLOGY | Facility: CLINIC | Age: 73
End: 2018-01-24
Payer: MEDICARE

## 2018-01-24 VITALS
HEIGHT: 63 IN | HEART RATE: 107 BPM | WEIGHT: 127 LBS | OXYGEN SATURATION: 96 % | DIASTOLIC BLOOD PRESSURE: 68 MMHG | BODY MASS INDEX: 22.5 KG/M2 | SYSTOLIC BLOOD PRESSURE: 146 MMHG

## 2018-01-24 DIAGNOSIS — N18.4 CKD (CHRONIC KIDNEY DISEASE), STAGE IV: Primary | ICD-10-CM

## 2018-01-24 DIAGNOSIS — N26.1 ATROPHY OF LEFT KIDNEY: ICD-10-CM

## 2018-01-24 DIAGNOSIS — N28.1 KIDNEY CYSTS: ICD-10-CM

## 2018-01-24 PROCEDURE — 99499 UNLISTED E&M SERVICE: CPT | Mod: S$GLB,,, | Performed by: INTERNAL MEDICINE

## 2018-01-24 PROCEDURE — 99999 PR PBB SHADOW E&M-EST. PATIENT-LVL III: CPT | Mod: PBBFAC,,, | Performed by: INTERNAL MEDICINE

## 2018-01-24 PROCEDURE — 99204 OFFICE O/P NEW MOD 45 MIN: CPT | Mod: S$GLB,,, | Performed by: INTERNAL MEDICINE

## 2018-01-24 NOTE — PROGRESS NOTES
Subjective:   Patient ID: Katie Quevedo is a 72 y.o. White female who presents for new evaluation of Chronic Kidney Disease  .   HPI   was seen in clinic today for new patient evaluation for CKD. She was referred by her PCP Dr. Verduzco. Prior pertinent chart was reviewed. She arrived in the clinic in a wheelchair. She was accompanied by her .    It appears that several years ago, she was referred to see Dr. Macias in 2011 but did not see him actually. She states she has been aware of abnormal kidney function as she was told during one of the hospitalizations in the remote past. She states she did not see a kidney specialist although. She feels she does not have high BP but chart review indicates at least h/o hypertension.    On further questioning she admits to longstanding and heavy use of aspirin and occasional NSAID type medicines for various DJD related pain she has. She has OA, COPD, chronic neck pain, pulmonary embolism and other medical problems.     She denies any recent nausea/ vomiting/ diarrhea/ decreased urine/ cloudy urine/ dysuria/ flank pain. Serial labs reviewed and d/w patient and her , her onset of CKD appears to be in 2011. Prior multiple US kidneys shows smaller left kidney and bilateral kidney cysts. Previously she has multiple episodes of E. Coli UTI. It appears she had transient follow up in urology clinic in 2014.     Labs noted for recent baseline creatinine of 2. She admits she does not drink enough water. Unclear if she checks BP at home but some of the recent clinic BP readings noted to be 159/81.    Pt states her mother had some kidney problems but she is not sure of any details. She admits to tobacco smoking in the past but quit several years ago.    Lab Results   Component Value Date    GLU 80 01/22/2018     01/22/2018    K 4.3 01/22/2018     01/22/2018    CO2 25 01/22/2018    BUN 32 (H) 01/22/2018    CALCIUM 10.2 01/22/2018    CREATININE 2.0 (H)  01/22/2018    ALBUMIN 3.1 (L) 12/14/2017    PHOS 4.5 12/14/2017    ESTGFRAFRICA 28 (A) 01/22/2018    EGFRNONAA 24 (A) 01/22/2018     Lab Results   Component Value Date    COLORU Yellow 01/22/2018    APPEARANCEUA Clear 01/22/2018    PHUR 7.0 01/22/2018    SPECGRAV 1.010 01/22/2018    PROTEINUA Negative 01/22/2018    GLUCUA Negative 01/22/2018    KETONESU Negative 01/22/2018    BILIRUBINUA Negative 01/22/2018    OCCULTUA Negative 01/22/2018    NITRITE Negative 01/22/2018    UROBILINOGEN Negative 01/22/2018    LEUKOCYTESUR Negative 01/22/2018     Lab Results   Component Value Date    CREATRANDUR 68.0 01/22/2018     Lab Results   Component Value Date    WBC 12.07 01/22/2018    HGB 11.7 (L) 01/22/2018    HCT 36.3 (L) 01/22/2018    MCV 93 01/22/2018     01/22/2018       Review of Systems   Constitutional: Negative for activity change, appetite change, chills, fatigue and fever.   HENT: Negative for ear discharge, ear pain, sneezing and sore throat.    Eyes: Negative for redness and visual disturbance.   Respiratory: Negative for cough, shortness of breath and wheezing.    Cardiovascular: Negative for chest pain and leg swelling.   Gastrointestinal: Negative for abdominal pain, blood in stool, diarrhea, nausea and vomiting.   Endocrine: Negative for polydipsia and polyuria.   Genitourinary: Negative for decreased urine volume, difficulty urinating, dysuria, flank pain, frequency and hematuria.   Musculoskeletal: Positive for arthralgias and back pain.   Skin: Negative for pallor and rash.   Neurological: Negative for dizziness, light-headedness and headaches.   Psychiatric/Behavioral: Negative for behavioral problems. The patient is nervous/anxious.        Objective:   Physical Exam   Constitutional: She is oriented to person, place, and time. She appears well-developed and well-nourished. No distress.   HENT:   Head: Normocephalic and atraumatic.   Mouth/Throat: Oropharynx is clear and moist.   Eyes: Right eye  exhibits no discharge. Left eye exhibits no discharge. No scleral icterus.   Neck: Neck supple.   Cardiovascular: Normal rate, regular rhythm and normal heart sounds.  Exam reveals no friction rub.    No murmur heard.  Pulmonary/Chest: Effort normal and breath sounds normal. No respiratory distress. She has no wheezes. She has no rales.   Abdominal: Soft. She exhibits no distension. There is no tenderness. There is no guarding.   No CVA tenderness, no mass felt   Genitourinary:   Genitourinary Comments: deferred   Musculoskeletal: She exhibits no edema.   Lymphadenopathy:     She has no cervical adenopathy.   Neurological: She is alert and oriented to person, place, and time.   Speech normal, cognition normal, moving all 4 limbs   Skin: Skin is warm and dry. No rash noted.   Psychiatric:   Anxious appearing    Nursing note and vitals reviewed.      Anxious appearing  Assessment:     1. CKD (chronic kidney disease), stage IV    2. Atrophy of left kidney    3. Kidney cysts        Plan:   Ms. Quevedo has CKD IV due to hypertension (although currently not on any treatment), left kidney atrophy which could be congenital vs due to chronic atherosclerotic vascular disease, high dose of aspirin used over number of years.    Onset of CKD, gradual progression per serial labs, CKD diagnosis, CKD staging, potential risk of progression, need for better hydration, need for closer management of hypertension, following low salt diet and avoiding any NSAID was discussed with her in detail. Explained increased risk of YOSVANY superimposed on CKD from various insults like acute hemodynamic changes/ CLARK/ volume depletion/ NSAID use/ nephrotoxins, possible risk of progression of CKD d/w her.     She has microalbuminuria. If her BP can tolerate she can be started on low dose ACE-I or ARB for renoprotection, of course she will need close monitoring of K given eGFR already at around 24.     Additional plan  - periodically monitor renal panel for  electrolytes, acid base status, eGFR  - CKD staging, diagnosis, onset of CKD, potential risk of CKD progression, potential risk of YOSVANY due to volume depletion/ CLARK/ ATN due to hemodynamic changes d/w patient  - stress to follow low salt diet, keep well hydrated, follow low K diet in case of any dyskalemia, nutritional changes d/w patient   - trend PTH levels, vit D, phos, corrected Ca and treat as necessary   - trend urine studies for proteinuria  - obtain screening labs for hep C/B, labs to rule out paraproteinemia   - obtain US kidney for kidney size, rule out mass/ cyst  - avoid NSAID/ bactrim/ IV contrast/ gadolinium/ aminoglycoside/ fleet enema/ PPI where possible  - consider low dose ACE-I/ ARB containing regimen for RAAS blockade with close monitoring of potassium levels. Pt encouraged to monitor BP at home, goal BP level d/w patient  - trend H/H periodically if CKD stage progresses  - renal dosing of medicines given her CKD IV, she is being treated with oral anticoagulation for PE    Plan, labs, recommendations were discussed with patient and her , their questions were answered to their satisfaction.   Total time spent was 50 minutes with >50% time spent in face to face evaluation, counseling about possible etiology, work up, monitoring, natural course of illness, recommendations.     Follow-up in about 2 months (around 3/24/2018).

## 2018-01-24 NOTE — LETTER
January 25, 2018      Clemencia Gambino MD  [address]  [address]  [facility] [address]           [facility] - Nephrology  [address]  [place] [address]  Phone: [phone]  Fax: [phone]          Patient: Katie Quevedo   MR Number: [id number]   YOB: 1945   Date of Visit: 1/24/2018       Dear Dr. Clemencia Gambino:    Thank you for referring Katie Quevedo to me for evaluation. Attached you will find relevant portions of my assessment and plan of care.    If you have questions, please do not hesitate to call me. I look forward to following Katie Quevedo along with you.    Sincerely,    Tiffanie Spivey MD    Enclosure  CC:  No Recipients    If you would like to receive this communication electronically, please contact [email or web address] or [phone] to request more information on [employer] access.    For providers and/or their staff who would like to refer a patient to [facility], please contact us through our one-stop-shop provider referral line, [facility], at [phone].    If you feel you have received this communication in error or would no longer like to receive these types of communications, please e-mail [email or web address]

## 2018-01-25 PROBLEM — N18.4 CKD (CHRONIC KIDNEY DISEASE), STAGE IV: Status: ACTIVE | Noted: 2018-01-25

## 2018-01-25 PROBLEM — N26.1 ATROPHY OF LEFT KIDNEY: Status: ACTIVE | Noted: 2018-01-25

## 2018-01-25 PROBLEM — N28.1 KIDNEY CYSTS: Status: ACTIVE | Noted: 2018-01-25

## 2018-01-29 ENCOUNTER — HOSPITAL ENCOUNTER (OUTPATIENT)
Dept: RADIOLOGY | Facility: HOSPITAL | Age: 73
Discharge: HOME | End: 2018-01-29
Attending: INTERNAL MEDICINE
Payer: MEDICARE

## 2018-01-29 ENCOUNTER — NURSE TRIAGE (OUTPATIENT)
Dept: ADMINISTRATIVE | Facility: CLINIC | Age: 73
End: 2018-01-29

## 2018-01-29 VITALS — WEIGHT: 127 LBS | BODY MASS INDEX: 23.37 KG/M2 | HEIGHT: 62 IN

## 2018-01-29 DIAGNOSIS — Z12.31 ENCOUNTER FOR SCREENING MAMMOGRAM FOR BREAST CANCER: ICD-10-CM

## 2018-01-29 PROCEDURE — 77067 SCR MAMMO BI INCL CAD: CPT | Mod: 26,,, | Performed by: RADIOLOGY

## 2018-01-29 PROCEDURE — 77067 SCR MAMMO BI INCL CAD: CPT | Mod: TC

## 2018-01-29 PROCEDURE — 77063 BREAST TOMOSYNTHESIS BI: CPT | Mod: 26,,, | Performed by: RADIOLOGY

## 2018-01-30 NOTE — TELEPHONE ENCOUNTER
Reason for Disposition   Caller requesting a NON-URGENT new prescription or refill and triager unable to refill per unit policy    Answer Assessment - Initial Assessment Questions  Pt reports she has stage 3 kidney disease. Normally takes trazadone for sleep but hasn't had this in 2 wks.has some otc melatonin.wanted to know if this is safe to take.    Protocols used: ST MEDICATION QUESTION CALL-A-AH

## 2018-02-05 ENCOUNTER — HOSPITAL ENCOUNTER (OUTPATIENT)
Dept: PULMONOLOGY | Facility: HOSPITAL | Age: 73
Discharge: HOME | End: 2018-02-05
Attending: INTERNAL MEDICINE
Payer: MEDICARE

## 2018-02-05 DIAGNOSIS — J44.9 CHRONIC OBSTRUCTIVE PULMONARY DISEASE, UNSPECIFIED COPD TYPE: ICD-10-CM

## 2018-02-05 DIAGNOSIS — J44.9 COPD (CHRONIC OBSTRUCTIVE PULMONARY DISEASE): ICD-10-CM

## 2018-02-06 ENCOUNTER — OFFICE VISIT (OUTPATIENT)
Dept: PRIMARY CARE CLINIC | Facility: CLINIC | Age: 73
End: 2018-02-06
Payer: MEDICARE

## 2018-02-06 ENCOUNTER — TELEPHONE (OUTPATIENT)
Dept: PRIMARY CARE CLINIC | Facility: CLINIC | Age: 73
End: 2018-02-06

## 2018-02-06 VITALS
SYSTOLIC BLOOD PRESSURE: 151 MMHG | DIASTOLIC BLOOD PRESSURE: 75 MMHG | OXYGEN SATURATION: 96 % | WEIGHT: 135.94 LBS | TEMPERATURE: 97 F | BODY MASS INDEX: 24.86 KG/M2 | HEART RATE: 79 BPM

## 2018-02-06 DIAGNOSIS — Z86.010 HISTORY OF COLON POLYPS: ICD-10-CM

## 2018-02-06 DIAGNOSIS — N18.4 CKD (CHRONIC KIDNEY DISEASE), STAGE IV: ICD-10-CM

## 2018-02-06 DIAGNOSIS — R09.02 HYPOXIA: ICD-10-CM

## 2018-02-06 DIAGNOSIS — I26.99 OTHER ACUTE PULMONARY EMBOLISM WITHOUT ACUTE COR PULMONALE: Primary | ICD-10-CM

## 2018-02-06 DIAGNOSIS — I10 ESSENTIAL HYPERTENSION: ICD-10-CM

## 2018-02-06 DIAGNOSIS — Z99.81 SUPPLEMENTAL OXYGEN DEPENDENT: ICD-10-CM

## 2018-02-06 PROBLEM — R00.0 TACHYCARDIA: Status: RESOLVED | Noted: 2017-11-06 | Resolved: 2018-02-06

## 2018-02-06 PROBLEM — D50.9 IRON DEFICIENCY ANEMIA: Status: RESOLVED | Noted: 2018-01-22 | Resolved: 2018-02-06

## 2018-02-06 PROCEDURE — 1159F MED LIST DOCD IN RCRD: CPT | Mod: S$GLB,,, | Performed by: INTERNAL MEDICINE

## 2018-02-06 PROCEDURE — 1126F AMNT PAIN NOTED NONE PRSNT: CPT | Mod: S$GLB,,, | Performed by: INTERNAL MEDICINE

## 2018-02-06 PROCEDURE — 99999 PR PBB SHADOW E&M-EST. PATIENT-LVL III: CPT | Mod: PBBFAC,,, | Performed by: INTERNAL MEDICINE

## 2018-02-06 PROCEDURE — 99499 UNLISTED E&M SERVICE: CPT | Mod: S$GLB,,, | Performed by: INTERNAL MEDICINE

## 2018-02-06 PROCEDURE — 3008F BODY MASS INDEX DOCD: CPT | Mod: S$GLB,,, | Performed by: INTERNAL MEDICINE

## 2018-02-06 PROCEDURE — 99214 OFFICE O/P EST MOD 30 MIN: CPT | Mod: S$GLB,,, | Performed by: INTERNAL MEDICINE

## 2018-02-06 RX ORDER — LISINOPRIL 10 MG/1
10 TABLET ORAL DAILY
Qty: 90 TABLET | Refills: 3 | Status: ON HOLD | OUTPATIENT
Start: 2018-02-06 | End: 2018-03-06 | Stop reason: HOSPADM

## 2018-02-06 NOTE — PROGRESS NOTES
Subjective:       Patient ID: Katie Quevedo is a 72 y.o. female.    Chief Complaint: Hospital follow up    HPI:  [facility].  Recently hospitalized for Pulmonary embolus, seemingly unprovoked.   Anticoagulation initiated with Eliquis.  Patient discharged to home.     Dyspnea has improved but is still present.  She is ambulatory without assistive devices and independent with ADL's.  Able to conduct basic chores in the home.  Room Air Sat 93 % resting, 77% ambulating.   Will arrange for supplemental home oxygen.    Reports compliance with home medications.  Recently re-established nephrology care for CKD.  Had normal mammogram.  Recent labs unremarkable.   Needs colonoscopy but will wait until off anticoagulation.   Recently had PFT's done with Pulmonology- results not available at this time.     Review of Systems  General ROS: negative for chills, fever or weight loss  Psychological ROS: negative for hallucination, depression or suicidal ideation  + anxiety, insomnia  Ophthalmic ROS: negative for blurry vision, photophobia or eye pain  ENT ROS: negative for epistaxis, sore throat or rhinorrhea  Respiratory ROS: no cough, or wheezing +shortness of breath  Cardiovascular ROS: no chest pain or + dyspnea with minimal exertion  Gastrointestinal ROS: no abdominal pain, change in bowel habits, or black/ bloody stools  Genito-Urinary ROS: no dysuria, trouble voiding, or hematuria  Musculoskeletal ROS: negative for gait disturbance or muscular weakness  Neurological ROS: no syncope or seizures; no ataxia  Dermatological ROS: negative for pruritis, rash and jaundice        Objective:      Vital Signs:  Vitals:    02/06/18 1106   BP: (!) 151/75   Pulse: 79   Temp: 97 °F (36.1 °C)     Wt Readings from Last 1 Encounters:   01/29/18 1713 57.6 kg (127 lb)     Body mass index is 24.86 kg/m².   SpO2 Readings from Last 1 Encounters:   01/24/18 96%       Physical Exam:  BP (!) 151/75 (BP Location:  Left arm, Patient Position: Sitting, BP Method: Small (Automatic))   Pulse 79   Temp 97 °F (36.1 °C) (Oral)   Wt 61.7 kg (135 lb 14.6 oz)   SpO2 96%   BMI 24.86 kg/m²   General appearance: alert, cooperative, no distress  Constitutional:Oriented to person, place, and time +appears well-developed and well-nourished.   HEENT: Normocephalic, atraumatic, neck symmetrical, no nasal discharge   Eyes: conjunctivae/corneas clear, PERRL, EOM's intact  Lungs: clear to auscultation bilaterally, no dullness to percussion bilaterally  Heart: regular rate and rhythm without rub; no displacement of the PMI   Abdomen: soft, non-tender; bowel sounds normoactive; no organomegaly  Extremities: extremities symmetric; no clubbing, cyanosis, or edema  Integument: Skin color, texture, turgor normal; no rashes; hair distrubution normal  Neurologic: Alert and oriented X 3, normal strength, normal coordination and gait  Psychiatric: no pressured speech; normal affect; no evidence of impaired cognition    Assessment:       1. Other acute pulmonary embolism without acute cor pulmonale    2. Hypoxia    3. Supplemental oxygen dependent    4. Essential hypertension    5. CKD (chronic kidney disease), stage IV    6. History of colon polyps        Plan:       Diagnoses and all orders for this visit:    Other acute pulmonary embolism without acute cor pulmonale  -recent hospitalization for acute PE, diagnosed by VQ scan  - continues to have symptomatic dyspnea,  Desaturates significantly with minimal exertion- 77% after ambulating in office today  - tolerating anticoagulation with Eliquis  -  episode appears to be unprovoked- recent labs and mammogram unrevealing; patient with hx benign colon polyps and due for colonoscopy when off anticoagulation, does have hx Melanoma but surgically resected with no obvious recurrence     -     OXYGEN FOR HOME USE    Hypoxia  Supplemental oxygen dependent  - likely due to PE, but patient with hx of COPD  -  COPD followed by [facility], had PFT's yesterday- pulmonary records and PFT results not available at this time  - arranging for home oxygen today- patient will need this at least temporarily while recovering from PE, possibly long term based on severity of COPD    Essential hypertension  -     lisinopril 10 MG tablet; Take 1 tablet (10 mg total) by mouth once daily.    CKD (chronic kidney disease), stage IV  - recently re-established Nephrology care, consult reviewed    History of colon polyps  - benign colon polyp removed 2013, recommendation was for 5 year colonoscopy- she is due now, will wait until she is off of anticoagulation             Scheduled Follow-up :  Future Appointments  Date Time Provider Department Center   2/8/2018 1:00 PM Zainab Foote OD [facility] [place]   2/20/2018 10:30 AM Clemencia Gambino MD [facility]   4/9/2018 2:20 PM Pushpa Mcmahon MD [facility]       Post Visit Medication List:     Medication List          Accurate as of 2/6/18 12:04 PM. If you have any questions, ask your nurse or doctor.               START taking these medications    lisinopril 10 MG tablet  Take 1 tablet (10 mg total) by mouth once daily.  Started by:  Clemencia Gambino MD        CONTINUE taking these medications    apixaban 5 mg Tab  Take 1 tablet (5 mg total) by mouth 2 (two) times daily.     diazePAM 5 MG tablet  Commonly known as:  VALIUM  Take 1 tablet (5 mg total) by mouth daily as needed for Anxiety.     fluticasone 50 mcg/actuation nasal spray  Commonly known as:  FLONASE  1 spray by Each Nare route once daily.     fluticasone-salmeterol 250-50 mcg/dose 250-50 mcg/dose diskus inhaler  Commonly known as:  ADVAIR  Inhale 1 puff into the lungs 2 (two) times daily.     ipratropium-albuterol  mcg/actuation inhaler  Commonly known as:  COMBIVENT  Inhale 1 puff into the lungs every 4 (four) hours as needed for Wheezing. Rescue     morphine 15 MG 12 hr  tablet  Commonly known as:  MS CONTIN     oxyCODONE-acetaminophen  mg per tablet  Commonly known as:  PERCOCET     paroxetine 30 MG tablet  Commonly known as:  PAXIL     tiZANidine 4 MG tablet  Commonly known as:  ZANAFLEX  TAKE 1-2 TABLETS BY MOUTH THREE TIMES A DAY AS NEEDED     traZODone 50 MG tablet  Commonly known as:  DESYREL  TAKE 1-2 TABLETS BY MOUTH NIGHTLY AS NEEDED FOR INSOMNIA     triamcinolone acetonide 0.1% 0.1 % cream  Commonly known as:  KENALOG  Use hs prn itching        STOP taking these medications    hydrocodone-acetaminophen 10-325mg  mg Tab  Commonly known as:  NORCO  Stopped by:  Aura Diggs MD           Where to Get Your Medications      These medications were sent to [facility] - [facility] - [address]  [address] [place] [address]    Phone:  [phone]   · lisinopril 10 MG tablet

## 2018-02-06 NOTE — Clinical Note
Hi Dr Donato, I am trying to coordinate care for Ms Quevedo. Do you follow Ms Quevedo and does she see you at [facility]? I am unable to access and Pulmonary records and she isn't able to provide specifics to me. She recently developed a PE and I had to order home oxygen for her today. A bit concerning as she did not need it at hospital discharge or initial office follow up, but several weeks out she needs it now. I'm wondering about the severity of her COPD and if this is contributing to her hypoxia today, or if this is all from PE. Can you touch base with me regarding your thoughts on her? Thanks, Clemencia Gambino MD, [facility], office [phone], cell [phone]

## 2018-02-07 ENCOUNTER — TELEPHONE (OUTPATIENT)
Dept: PRIMARY CARE CLINIC | Facility: CLINIC | Age: 73
End: 2018-02-07

## 2018-02-07 NOTE — TELEPHONE ENCOUNTER
Left message for Chintan to return my call in regards to Mrs. Quevedo. Mrs. Quevedo needs to come back into office for additional oxygen testing.

## 2018-02-07 NOTE — PROCEDURES
[facility] Pulmonary Medicine Pulmonary Function Test Interpretation    - This test meets ATS criteria for acceptability and repeatability.    - There is evidence of moderate (FEV1 >50 to <80% of predicted) airflow obstruction    - Following administration of bronchodilators, there is no significant response.    - There is evidence of air trapping and hyperinflation    - There is no significant restriction by total lung volume measurement    - The DLCO uncorrected for hemoglobin is moderately reduced.    Impression: Consistent with known diagnosis of COPD    Gavin Mcclellan MD  Pulmonary/CCM Fellow

## 2018-02-20 RX ORDER — HYDROCODONE BITARTRATE AND ACETAMINOPHEN 10; 325 MG/1; MG/1
1 TABLET ORAL 3 TIMES DAILY PRN
Qty: 90 TABLET | Refills: 0 | Status: ON HOLD | OUTPATIENT
Start: 2018-02-21 | End: 2018-03-05 | Stop reason: HOSPADM

## 2018-02-27 ENCOUNTER — TELEPHONE (OUTPATIENT)
Dept: PRIMARY CARE CLINIC | Facility: CLINIC | Age: 73
End: 2018-02-27

## 2018-03-01 ENCOUNTER — PES CALL (OUTPATIENT)
Dept: ADMINISTRATIVE | Facility: CLINIC | Age: 73
End: 2018-03-01

## 2018-03-05 ENCOUNTER — HOSPITAL ENCOUNTER (OUTPATIENT)
Facility: HOSPITAL | Age: 73
Discharge: HOME | End: 2018-03-06
Attending: EMERGENCY MEDICINE | Admitting: INTERNAL MEDICINE
Payer: MEDICARE

## 2018-03-05 DIAGNOSIS — J96.21 ACUTE ON CHRONIC RESPIRATORY FAILURE WITH HYPOXIA: ICD-10-CM

## 2018-03-05 DIAGNOSIS — N17.9 ACUTE RENAL FAILURE SUPERIMPOSED ON CHRONIC KIDNEY DISEASE, UNSPECIFIED CKD STAGE, UNSPECIFIED ACUTE RENAL FAILURE TYPE: ICD-10-CM

## 2018-03-05 DIAGNOSIS — R07.9 CHEST PAIN, UNSPECIFIED TYPE: ICD-10-CM

## 2018-03-05 DIAGNOSIS — R07.9 CHEST PAIN: Primary | ICD-10-CM

## 2018-03-05 DIAGNOSIS — N17.9 ACUTE RENAL FAILURE, UNSPECIFIED ACUTE RENAL FAILURE TYPE: ICD-10-CM

## 2018-03-05 DIAGNOSIS — I26.99 OTHER ACUTE PULMONARY EMBOLISM WITHOUT ACUTE COR PULMONALE: ICD-10-CM

## 2018-03-05 DIAGNOSIS — R09.02 HYPOXEMIA: ICD-10-CM

## 2018-03-05 DIAGNOSIS — I10 ESSENTIAL HYPERTENSION: ICD-10-CM

## 2018-03-05 DIAGNOSIS — D50.9 IRON DEFICIENCY ANEMIA, UNSPECIFIED IRON DEFICIENCY ANEMIA TYPE: ICD-10-CM

## 2018-03-05 DIAGNOSIS — N18.9 ACUTE RENAL FAILURE SUPERIMPOSED ON CHRONIC KIDNEY DISEASE, UNSPECIFIED CKD STAGE, UNSPECIFIED ACUTE RENAL FAILURE TYPE: ICD-10-CM

## 2018-03-05 DIAGNOSIS — E87.5 HYPERKALEMIA: ICD-10-CM

## 2018-03-05 DIAGNOSIS — I30.0 ACUTE IDIOPATHIC PERICARDITIS: ICD-10-CM

## 2018-03-05 LAB
ALBUMIN SERPL BCP-MCNC: 3.9 G/DL
ALLENS TEST: ABNORMAL
ALP SERPL-CCNC: 83 U/L
ALT SERPL W/O P-5'-P-CCNC: 12 U/L
ANION GAP SERPL CALC-SCNC: 12 MMOL/L
ANION GAP SERPL CALC-SCNC: 13 MMOL/L
ANION GAP SERPL CALC-SCNC: 13 MMOL/L
APTT BLDCRRT: 25.7 SEC
AST SERPL-CCNC: 21 U/L
BACTERIA #/AREA URNS HPF: ABNORMAL /HPF
BASOPHILS # BLD AUTO: 0.05 K/UL
BASOPHILS NFR BLD: 0.4 %
BILIRUB SERPL-MCNC: 0.2 MG/DL
BILIRUB UR QL STRIP: NEGATIVE
BNP SERPL-MCNC: 81 PG/ML
BUN SERPL-MCNC: 69 MG/DL
BUN SERPL-MCNC: 69 MG/DL
BUN SERPL-MCNC: 71 MG/DL
BUN SERPL-MCNC: 72 MG/DL
BUN SERPL-MCNC: 73 MG/DL
CALCIUM SERPL-MCNC: 9.2 MG/DL
CALCIUM SERPL-MCNC: 9.2 MG/DL
CALCIUM SERPL-MCNC: 9.4 MG/DL
CALCIUM SERPL-MCNC: 9.4 MG/DL
CALCIUM SERPL-MCNC: 9.6 MG/DL
CALCIUM SERPL-MCNC: 9.9 MG/DL
CALCIUM SERPL-MCNC: 9.9 MG/DL
CHLORIDE SERPL-SCNC: 104 MMOL/L
CHLORIDE SERPL-SCNC: 106 MMOL/L
CHOLEST SERPL-MCNC: 183 MG/DL
CHOLEST/HDLC SERPL: 3.5 {RATIO}
CLARITY UR: CLEAR
CO2 SERPL-SCNC: 17 MMOL/L
CO2 SERPL-SCNC: 18 MMOL/L
CO2 SERPL-SCNC: 21 MMOL/L
COLOR UR: YELLOW
CREAT SERPL-MCNC: 3 MG/DL
CREAT SERPL-MCNC: 3.1 MG/DL
CREAT SERPL-MCNC: 3.2 MG/DL
CREAT UR-MCNC: 110.3 MG/DL
DELSYS: ABNORMAL
DIASTOLIC DYSFUNCTION: NO
DIFFERENTIAL METHOD: ABNORMAL
EOSINOPHIL # BLD AUTO: 0.7 K/UL
EOSINOPHIL NFR BLD: 4.9 %
ERYTHROCYTE [DISTWIDTH] IN BLOOD BY AUTOMATED COUNT: 13.7 %
EST. GFR  (AFRICAN AMERICAN): 16 ML/MIN/1.73 M^2
EST. GFR  (AFRICAN AMERICAN): 17 ML/MIN/1.73 M^2
EST. GFR  (NON AFRICAN AMERICAN): 14 ML/MIN/1.73 M^2
EST. GFR  (NON AFRICAN AMERICAN): 15 ML/MIN/1.73 M^2
ESTIMATED PA SYSTOLIC PRESSURE: 19
GLUCOSE SERPL-MCNC: 111 MG/DL
GLUCOSE SERPL-MCNC: 168 MG/DL
GLUCOSE SERPL-MCNC: 168 MG/DL
GLUCOSE SERPL-MCNC: 191 MG/DL
GLUCOSE SERPL-MCNC: 95 MG/DL
GLUCOSE UR QL STRIP: NEGATIVE
HCO3 UR-SCNC: 21.6 MMOL/L (ref 24–28)
HCT VFR BLD AUTO: 32.7 %
HDLC SERPL-MCNC: 52 MG/DL
HDLC SERPL: 28.4 %
HGB BLD-MCNC: 10.3 G/DL
HGB UR QL STRIP: NEGATIVE
HYALINE CASTS #/AREA URNS LPF: 2 /LPF
INR PPP: 0.9
KETONES UR QL STRIP: NEGATIVE
LDLC SERPL CALC-MCNC: 103.2 MG/DL
LEUKOCYTE ESTERASE UR QL STRIP: ABNORMAL
LYMPHOCYTES # BLD AUTO: 3.4 K/UL
LYMPHOCYTES NFR BLD: 25.3 %
MCH RBC QN AUTO: 30.3 PG
MCHC RBC AUTO-ENTMCNC: 31.5 G/DL
MCV RBC AUTO: 96 FL
MICROSCOPIC COMMENT: ABNORMAL
MITRAL VALVE MOBILITY: NORMAL
MODE: ABNORMAL
MONOCYTES # BLD AUTO: 0.9 K/UL
MONOCYTES NFR BLD: 6.3 %
NEUTROPHILS # BLD AUTO: 8.5 K/UL
NEUTROPHILS NFR BLD: 62.8 %
NITRITE UR QL STRIP: NEGATIVE
NON-SQ EPI CELLS #/AREA URNS HPF: 2 /HPF
NONHDLC SERPL-MCNC: 131 MG/DL
PCO2 BLDA: 39.8 MMHG (ref 35–45)
PH SMN: 7.34 [PH] (ref 7.35–7.45)
PH UR STRIP: 6 [PH] (ref 5–8)
PLATELET # BLD AUTO: 286 K/UL
PMV BLD AUTO: 10 FL
PO2 BLDA: 65 MMHG (ref 80–100)
POC BE: -4 MMOL/L
POC SATURATED O2: 91 % (ref 95–100)
POC TCO2: 23 MMOL/L (ref 23–27)
POCT GLUCOSE: 86 MG/DL (ref 70–110)
POTASSIUM SERPL-SCNC: 5.6 MMOL/L
POTASSIUM SERPL-SCNC: 5.6 MMOL/L
POTASSIUM SERPL-SCNC: 5.7 MMOL/L
POTASSIUM SERPL-SCNC: 5.8 MMOL/L
POTASSIUM SERPL-SCNC: 5.8 MMOL/L
POTASSIUM SERPL-SCNC: 6.3 MMOL/L
POTASSIUM SERPL-SCNC: 6.3 MMOL/L
PROT SERPL-MCNC: 7.2 G/DL
PROT UR QL STRIP: NEGATIVE
PROTHROMBIN TIME: 9.9 SEC
RBC # BLD AUTO: 3.4 M/UL
RETIRED EF AND QEF - SEE NOTES: 60 (ref 55–65)
SAMPLE: ABNORMAL
SITE: ABNORMAL
SODIUM SERPL-SCNC: 135 MMOL/L
SODIUM SERPL-SCNC: 136 MMOL/L
SODIUM SERPL-SCNC: 137 MMOL/L
SODIUM UR-SCNC: 23 MMOL/L
SP GR UR STRIP: <=1.005 (ref 1–1.03)
SQUAMOUS #/AREA URNS HPF: 2 /HPF
TRICUSPID VALVE REGURGITATION: NORMAL
TRIGL SERPL-MCNC: 139 MG/DL
TROPONIN I SERPL DL<=0.01 NG/ML-MCNC: <0.006 NG/ML
URN SPEC COLLECT METH UR: ABNORMAL
UROBILINOGEN UR STRIP-ACNC: NEGATIVE EU/DL
WBC # BLD AUTO: 13.55 K/UL
WBC #/AREA URNS HPF: 2 /HPF (ref 0–5)

## 2018-03-05 PROCEDURE — 25000242 PHARM REV CODE 250 ALT 637 W/ HCPCS: Performed by: INTERNAL MEDICINE

## 2018-03-05 PROCEDURE — 96365 THER/PROPH/DIAG IV INF INIT: CPT | Mod: 59

## 2018-03-05 PROCEDURE — 25000242 PHARM REV CODE 250 ALT 637 W/ HCPCS: Performed by: STUDENT IN AN ORGANIZED HEALTH CARE EDUCATION/TRAINING PROGRAM

## 2018-03-05 PROCEDURE — 93005 ELECTROCARDIOGRAM TRACING: CPT

## 2018-03-05 PROCEDURE — 84484 ASSAY OF TROPONIN QUANT: CPT

## 2018-03-05 PROCEDURE — 85610 PROTHROMBIN TIME: CPT

## 2018-03-05 PROCEDURE — 93306 TTE W/DOPPLER COMPLETE: CPT

## 2018-03-05 PROCEDURE — 25000003 PHARM REV CODE 250: Performed by: EMERGENCY MEDICINE

## 2018-03-05 PROCEDURE — 94640 AIRWAY INHALATION TREATMENT: CPT

## 2018-03-05 PROCEDURE — 63600175 PHARM REV CODE 636 W HCPCS: Performed by: STUDENT IN AN ORGANIZED HEALTH CARE EDUCATION/TRAINING PROGRAM

## 2018-03-05 PROCEDURE — 80048 BASIC METABOLIC PNL TOTAL CA: CPT

## 2018-03-05 PROCEDURE — 27000221 HC OXYGEN, UP TO 24 HOURS

## 2018-03-05 PROCEDURE — 93010 ELECTROCARDIOGRAM REPORT: CPT | Mod: ,,, | Performed by: INTERNAL MEDICINE

## 2018-03-05 PROCEDURE — 25000003 PHARM REV CODE 250: Performed by: STUDENT IN AN ORGANIZED HEALTH CARE EDUCATION/TRAINING PROGRAM

## 2018-03-05 PROCEDURE — 80053 COMPREHEN METABOLIC PANEL: CPT

## 2018-03-05 PROCEDURE — 82803 BLOOD GASES ANY COMBINATION: CPT

## 2018-03-05 PROCEDURE — 93306 TTE W/DOPPLER COMPLETE: CPT | Mod: 26,,, | Performed by: INTERNAL MEDICINE

## 2018-03-05 PROCEDURE — 82962 GLUCOSE BLOOD TEST: CPT

## 2018-03-05 PROCEDURE — 83880 ASSAY OF NATRIURETIC PEPTIDE: CPT

## 2018-03-05 PROCEDURE — 85730 THROMBOPLASTIN TIME PARTIAL: CPT

## 2018-03-05 PROCEDURE — 84300 ASSAY OF URINE SODIUM: CPT

## 2018-03-05 PROCEDURE — 82570 ASSAY OF URINE CREATININE: CPT

## 2018-03-05 PROCEDURE — G0378 HOSPITAL OBSERVATION PER HR: HCPCS

## 2018-03-05 PROCEDURE — 36600 WITHDRAWAL OF ARTERIAL BLOOD: CPT

## 2018-03-05 PROCEDURE — 93010 ELECTROCARDIOGRAM REPORT: CPT | Mod: 77,S$GLB,, | Performed by: INTERNAL MEDICINE

## 2018-03-05 PROCEDURE — 36415 COLL VENOUS BLD VENIPUNCTURE: CPT

## 2018-03-05 PROCEDURE — 80048 BASIC METABOLIC PNL TOTAL CA: CPT | Mod: 91

## 2018-03-05 PROCEDURE — 94761 N-INVAS EAR/PLS OXIMETRY MLT: CPT

## 2018-03-05 PROCEDURE — 99285 EMERGENCY DEPT VISIT HI MDM: CPT | Mod: 25

## 2018-03-05 PROCEDURE — 25000003 PHARM REV CODE 250: Performed by: INTERNAL MEDICINE

## 2018-03-05 PROCEDURE — 63600175 PHARM REV CODE 636 W HCPCS: Performed by: EMERGENCY MEDICINE

## 2018-03-05 PROCEDURE — 81000 URINALYSIS NONAUTO W/SCOPE: CPT

## 2018-03-05 PROCEDURE — 94645 CONT INHLJ TX EACH ADDL HOUR: CPT

## 2018-03-05 PROCEDURE — 94644 CONT INHLJ TX 1ST HOUR: CPT

## 2018-03-05 PROCEDURE — 84484 ASSAY OF TROPONIN QUANT: CPT | Mod: 91

## 2018-03-05 PROCEDURE — 85025 COMPLETE CBC W/AUTO DIFF WBC: CPT

## 2018-03-05 PROCEDURE — 80061 LIPID PANEL: CPT

## 2018-03-05 PROCEDURE — 96376 TX/PRO/DX INJ SAME DRUG ADON: CPT

## 2018-03-05 PROCEDURE — 96375 TX/PRO/DX INJ NEW DRUG ADDON: CPT

## 2018-03-05 PROCEDURE — 93010 ELECTROCARDIOGRAM REPORT: CPT | Mod: 76,,, | Performed by: INTERNAL MEDICINE

## 2018-03-05 PROCEDURE — 63600175 PHARM REV CODE 636 W HCPCS: Performed by: INTERNAL MEDICINE

## 2018-03-05 RX ORDER — ONDANSETRON 2 MG/ML
4 INJECTION INTRAMUSCULAR; INTRAVENOUS
Status: COMPLETED | OUTPATIENT
Start: 2018-03-05 | End: 2018-03-05

## 2018-03-05 RX ORDER — ALBUTEROL SULFATE 0.83 MG/ML
10 SOLUTION RESPIRATORY (INHALATION) ONCE
Status: COMPLETED | OUTPATIENT
Start: 2018-03-05 | End: 2018-03-05

## 2018-03-05 RX ORDER — PREDNISONE 20 MG/1
40 TABLET ORAL ONCE
Status: COMPLETED | OUTPATIENT
Start: 2018-03-05 | End: 2018-03-05

## 2018-03-05 RX ORDER — NITROGLYCERIN 0.4 MG/1
0.4 TABLET SUBLINGUAL
Status: COMPLETED | OUTPATIENT
Start: 2018-03-05 | End: 2018-03-05

## 2018-03-05 RX ORDER — OXYCODONE AND ACETAMINOPHEN 10; 325 MG/1; MG/1
1 TABLET ORAL EVERY 4 HOURS PRN
Status: DISCONTINUED | OUTPATIENT
Start: 2018-03-05 | End: 2018-03-06 | Stop reason: HOSPADM

## 2018-03-05 RX ORDER — PAROXETINE 10 MG/1
30 TABLET, FILM COATED ORAL DAILY
Status: DISCONTINUED | OUTPATIENT
Start: 2018-03-05 | End: 2018-03-06 | Stop reason: HOSPADM

## 2018-03-05 RX ORDER — PREDNISONE 10 MG/1
10 TABLET ORAL DAILY
Qty: 35 TABLET | Refills: 0 | Status: SHIPPED | OUTPATIENT
Start: 2018-03-05 | End: 2018-03-06

## 2018-03-05 RX ORDER — SODIUM CHLORIDE 9 MG/ML
INJECTION, SOLUTION INTRAVENOUS CONTINUOUS
Status: ACTIVE | OUTPATIENT
Start: 2018-03-05 | End: 2018-03-05

## 2018-03-05 RX ORDER — MORPHINE SULFATE 4 MG/ML
4 INJECTION, SOLUTION INTRAMUSCULAR; INTRAVENOUS
Status: COMPLETED | OUTPATIENT
Start: 2018-03-05 | End: 2018-03-05

## 2018-03-05 RX ORDER — SODIUM POLYSTYRENE SULFONATE 15 G/60ML
30 SUSPENSION ORAL; RECTAL ONCE
Status: COMPLETED | OUTPATIENT
Start: 2018-03-05 | End: 2018-03-05

## 2018-03-05 RX ORDER — ASPIRIN 325 MG
325 TABLET ORAL
Status: COMPLETED | OUTPATIENT
Start: 2018-03-05 | End: 2018-03-05

## 2018-03-05 RX ORDER — ONDANSETRON 2 MG/ML
4 INJECTION INTRAMUSCULAR; INTRAVENOUS EVERY 6 HOURS PRN
Status: DISCONTINUED | OUTPATIENT
Start: 2018-03-05 | End: 2018-03-06 | Stop reason: HOSPADM

## 2018-03-05 RX ORDER — ACETAMINOPHEN 325 MG/1
650 TABLET ORAL ONCE
Status: COMPLETED | OUTPATIENT
Start: 2018-03-05 | End: 2018-03-05

## 2018-03-05 RX ORDER — SODIUM CHLORIDE 0.9 % (FLUSH) 0.9 %
5 SYRINGE (ML) INJECTION
Status: DISCONTINUED | OUTPATIENT
Start: 2018-03-05 | End: 2018-03-06 | Stop reason: HOSPADM

## 2018-03-05 RX ORDER — FLUTICASONE FUROATE AND VILANTEROL 100; 25 UG/1; UG/1
1 POWDER RESPIRATORY (INHALATION) DAILY
Status: DISCONTINUED | OUTPATIENT
Start: 2018-03-05 | End: 2018-03-06 | Stop reason: HOSPADM

## 2018-03-05 RX ORDER — TIZANIDINE 2 MG/1
2 TABLET ORAL EVERY 8 HOURS PRN
Status: DISCONTINUED | OUTPATIENT
Start: 2018-03-05 | End: 2018-03-06 | Stop reason: HOSPADM

## 2018-03-05 RX ORDER — ALBUTEROL SULFATE 0.83 MG/ML
10 SOLUTION RESPIRATORY (INHALATION) ONCE
Status: DISCONTINUED | OUTPATIENT
Start: 2018-03-05 | End: 2018-03-05

## 2018-03-05 RX ORDER — FLUTICASONE PROPIONATE 50 MCG
1 SPRAY, SUSPENSION (ML) NASAL DAILY
Status: DISCONTINUED | OUTPATIENT
Start: 2018-03-05 | End: 2018-03-06 | Stop reason: HOSPADM

## 2018-03-05 RX ADMIN — APIXABAN 2.5 MG: 2.5 TABLET, FILM COATED ORAL at 09:03

## 2018-03-05 RX ADMIN — INSULIN HUMAN 10 UNITS: 100 INJECTION, SOLUTION PARENTERAL at 01:03

## 2018-03-05 RX ADMIN — MORPHINE SULFATE 4 MG: 4 INJECTION INTRAVENOUS at 03:03

## 2018-03-05 RX ADMIN — DEXTROSE MONOHYDRATE 25 G: 25 INJECTION, SOLUTION INTRAVENOUS at 05:03

## 2018-03-05 RX ADMIN — DEXTROSE MONOHYDRATE 25 G: 25 INJECTION, SOLUTION INTRAVENOUS at 06:03

## 2018-03-05 RX ADMIN — ASPIRIN 325 MG ORAL TABLET 325 MG: 325 PILL ORAL at 03:03

## 2018-03-05 RX ADMIN — LORAZEPAM 1 MG: 2 INJECTION INTRAMUSCULAR; INTRAVENOUS at 04:03

## 2018-03-05 RX ADMIN — OXYCODONE HYDROCHLORIDE AND ACETAMINOPHEN 1 TABLET: 10; 325 TABLET ORAL at 09:03

## 2018-03-05 RX ADMIN — ALBUTEROL SULFATE 10 MG: 2.5 SOLUTION RESPIRATORY (INHALATION) at 06:03

## 2018-03-05 RX ADMIN — FLUTICASONE FUROATE AND VILANTEROL TRIFENATATE 1 PUFF: 100; 25 POWDER RESPIRATORY (INHALATION) at 12:03

## 2018-03-05 RX ADMIN — APIXABAN 2.5 MG: 2.5 TABLET, FILM COATED ORAL at 10:03

## 2018-03-05 RX ADMIN — PREDNISONE 40 MG: 20 TABLET ORAL at 02:03

## 2018-03-05 RX ADMIN — OXYCODONE HYDROCHLORIDE AND ACETAMINOPHEN 1 TABLET: 10; 325 TABLET ORAL at 03:03

## 2018-03-05 RX ADMIN — NITROGLYCERIN 0.4 MG: 0.4 TABLET SUBLINGUAL at 04:03

## 2018-03-05 RX ADMIN — NITROGLYCERIN 0.4 MG: 0.4 TABLET SUBLINGUAL at 05:03

## 2018-03-05 RX ADMIN — SODIUM CHLORIDE: 0.9 INJECTION, SOLUTION INTRAVENOUS at 09:03

## 2018-03-05 RX ADMIN — SODIUM POLYSTYRENE SULFONATE 30 G: 15 SUSPENSION ORAL; RECTAL at 09:03

## 2018-03-05 RX ADMIN — PAROXETINE 30 MG: 10 TABLET, FILM COATED ORAL at 10:03

## 2018-03-05 RX ADMIN — CALCIUM GLUCONATE 1000 MG: 98 INJECTION, SOLUTION INTRAVENOUS at 05:03

## 2018-03-05 RX ADMIN — LIDOCAINE HYDROCHLORIDE: 20 SOLUTION ORAL; TOPICAL at 05:03

## 2018-03-05 RX ADMIN — MORPHINE SULFATE 4 MG: 4 INJECTION INTRAVENOUS at 09:03

## 2018-03-05 RX ADMIN — ACETAMINOPHEN 650 MG: 325 TABLET ORAL at 08:03

## 2018-03-05 RX ADMIN — ALBUTEROL SULFATE 10 MG: 2.5 SOLUTION RESPIRATORY (INHALATION) at 12:03

## 2018-03-05 RX ADMIN — CALCIUM GLUCONATE 1000 MG: 98 INJECTION, SOLUTION INTRAVENOUS at 01:03

## 2018-03-05 RX ADMIN — ONDANSETRON 4 MG: 2 INJECTION, SOLUTION INTRAMUSCULAR; INTRAVENOUS at 03:03

## 2018-03-05 RX ADMIN — FLUTICASONE PROPIONATE 50 MCG: 50 SPRAY, METERED NASAL at 10:03

## 2018-03-05 RX ADMIN — SODIUM POLYSTYRENE SULFONATE 30 G: 15 SUSPENSION ORAL; RECTAL at 04:03

## 2018-03-05 RX ADMIN — DEXTROSE MONOHYDRATE 25 G: 500 INJECTION PARENTERAL at 01:03

## 2018-03-05 RX ADMIN — INSULIN HUMAN 10 UNITS: 100 INJECTION, SOLUTION PARENTERAL at 05:03

## 2018-03-05 RX ADMIN — SODIUM CHLORIDE: 0.9 INJECTION, SOLUTION INTRAVENOUS at 06:03

## 2018-03-05 NOTE — ED PROVIDER NOTES
Encounter Date: 3/5/2018    SCRIBE #1 NOTE: I, Leonard Neil, am scribing for, and in the presence of,  Dr. Rebecca Rousseau. I have scribed the entire note.       History     Chief Complaint   Patient presents with    Shortness of Breath     To ER with c/o SOB and chest pain, shoulder pain and upper back pain starting before midnight.     Time patient was seen by the provider: 3:00 AM      The patient is a 72 y.o. female with co-morbidities including: COPD, CKD stage 4, PE who presents to the ED with SOB and central chest pain. Sensation began upon awaking around midnight with sharpness in the chest, radiating from the central chest through the shoulders and back and labored breathing. Denies vomiting, fever, chills. She does report some associated nausea. She has a previous dx of Pe, but did not have CP associated with it. She has had chronic SOB consistent with her COPD. She has no hx of Mi.  SOB worse with lying flat. She was given 2 ASA 81 mg by her  with no apparent relief.          The history is provided by the patient.     Review of patient's allergies indicates:   Allergen Reactions    Aspirin      Other reaction(s): hx of ulcers    Penicillins      Other reaction(s): Hives  Other reaction(s): Rash  Other reaction(s): Rash  Other reaction(s): Hives    Tetracycline      Other reaction(s): Swelling     Past Medical History:   Diagnosis Date    Anemia     Bilateral renal cysts     Cataract     Chronic LBP 7/26/2012    Chronic pain     CKD (chronic kidney disease)     CKD (chronic kidney disease) stage 4, GFR 15-29 ml/min     COPD (chronic obstructive pulmonary disease)     Dehydration     Encounter for blood transfusion     HTN (hypertension)     Lumbar spondylosis     Melanoma     of the lip    Metabolic bone disease     Migraines, neuralgic     Osteoporosis     Primary osteoarthritis of both knees     s/p Rt TKA    Seizures     Subdeltoid bursitis, L>R. 9/27/2012    Ulcer      Vitamin D deficiency disease      Past Surgical History:   Procedure Laterality Date    BLADDER SUSPENSION      CATARACT EXTRACTION  11/18/13    left eye    CERVICAL LAMINECTOMY      x3, fusion x1    COLONOSCOPY  2009    HYSTERECTOMY      JOINT REPLACEMENT  2001    total right knee     LUMBAR LAMINECTOMY      x 3, fusion x1    OOPHORECTOMY       Family History   Problem Relation Age of Onset    Arthritis Mother     Stroke Mother     Hypertension Father     Cancer Father     Cataracts Father     Diabetes Maternal Aunt     Hypertension Maternal Grandfather     Heart disease Maternal Grandfather     Heart attack Maternal Grandfather     Cataracts Sister     Glaucoma Cousin      Social History   Substance Use Topics    Smoking status: Former Smoker     Types: Cigarettes    Smokeless tobacco: Never Used    Alcohol use Yes      Comment: Rare     Review of Systems   Constitutional: Negative for chills and fever.   HENT: Negative for congestion, rhinorrhea and sore throat.    Eyes: Negative for pain and visual disturbance.   Respiratory: Positive for chest tightness and shortness of breath. Negative for cough.    Cardiovascular: Positive for chest pain. Negative for leg swelling.   Gastrointestinal: Positive for nausea. Negative for abdominal pain, constipation, diarrhea and vomiting.   Genitourinary: Negative for dysuria and hematuria.   Musculoskeletal: Negative for back pain and neck pain.   Skin: Negative for rash.   Neurological: Negative for weakness and headaches.   Psychiatric/Behavioral: Negative for confusion.       Physical Exam     Initial Vitals [03/05/18 0255]   BP Pulse Resp Temp SpO2   (!) 105/50 85 (!) 22 97.9 °F (36.6 °C) (!) 88 %      MAP       68.33         Physical Exam    Nursing note and vitals reviewed.  Constitutional: She appears well-developed and well-nourished. She is not diaphoretic.   Appears anxious and uncomfortable   HENT:   Head: Normocephalic and atraumatic.   Right  Ear: External ear normal.   Left Ear: External ear normal.   Nose: Nose normal.   Eyes: Conjunctivae and EOM are normal. Pupils are equal, round, and reactive to light. Right eye exhibits no discharge. Left eye exhibits no discharge. No scleral icterus.   Neck: Normal range of motion. Neck supple. No JVD present.   Cardiovascular: Normal rate, regular rhythm, normal heart sounds and intact distal pulses. Exam reveals no gallop and no friction rub.    No murmur heard.  Pulmonary/Chest: Breath sounds normal. Tachypnea noted. She has no wheezes. She has no rhonchi. She has no rales. She exhibits tenderness.   Mid anterior wall chest tenderness  tachypneic   Abdominal: Soft. Bowel sounds are normal. She exhibits no distension and no mass. There is no tenderness. There is no rebound and no guarding.   Musculoskeletal: Normal range of motion. She exhibits no edema.   Neurological: She is alert and oriented to person, place, and time. No cranial nerve deficit.   Skin: Skin is dry. Capillary refill takes less than 2 seconds.   Psychiatric: Her speech is normal. Her mood appears anxious.         ED Course   Procedures  Labs Reviewed   CBC W/ AUTO DIFFERENTIAL - Abnormal; Notable for the following:        Result Value    WBC 13.55 (*)     RBC 3.40 (*)     Hemoglobin 10.3 (*)     Hematocrit 32.7 (*)     MCHC 31.5 (*)     Gran # (ANC) 8.5 (*)     Eos # 0.7 (*)     All other components within normal limits   COMPREHENSIVE METABOLIC PANEL - Abnormal; Notable for the following:     Potassium 5.7 (*)     CO2 21 (*)     BUN, Bld 72 (*)     Creatinine 3.0 (*)     eGFR if  17 (*)     eGFR if non  15 (*)     All other components within normal limits   URINALYSIS - Abnormal; Notable for the following:     Specific Gravity, UA <=1.005 (*)     Leukocytes, UA Trace (*)     All other components within normal limits   URINALYSIS MICROSCOPIC - Abnormal; Notable for the following:     Non-Squam Epith 2 (*)      Hyaline Casts, UA 2 (*)     All other components within normal limits   ISTAT PROCEDURE - Abnormal; Notable for the following:     POC PH 7.343 (*)     POC PO2 65 (*)     POC HCO3 21.6 (*)     POC SATURATED O2 91 (*)     All other components within normal limits   TROPONIN I   B-TYPE NATRIURETIC PEPTIDE   APTT   PROTIME-INR   APTT   PROTIME-INR   CREATININE, URINE, RANDOM   SODIUM, URINE, RANDOM   CREATININE, URINE, RANDOM   SODIUM, URINE, RANDOM   LIPID PANEL   TROPONIN I   LIPID PANEL   POCT GLUCOSE     EKG Readings: (Independently Interpreted)   3:12 AM  NSR  Rate 74  No STEMI  Flattened T-wave in V1 all others look normal  Normal intervals    3:24 AM  No significant change  Rate 73          Medical Decision Making:   Initial Assessment:   71 y/o female with hx of PE and COPD presents with SOB and chest pain. Will order  mg, cardiac workup, meds for pain and nausea and reassess  Differential Diagnosis:   PE, ACS, MI, PTX, pleural effusion, pericardial effusion, pericarditis, pulmonary edema  Clinical Tests:   Lab Tests: Ordered and Reviewed  Radiological Study: Ordered and Reviewed  Medical Tests: Ordered and Reviewed  ED Management:  Patient with SOB and CP   Cardiac enzymes negative, no STEMI on EKG.  Patient has known PE and is on eliquis, so not necessary to re-image.   New YOSVANY and hyperkalemia.  Also hypoxemic.   No peaked T waves.  Will admit for further management.      4:21 AM  Spoke to [facility] internal medicine, who will admit.                   ED Course as of Mar 05 0558   Mon Mar 05, 2018   0421 Discussed with \[facility]\"" internal medicine who will admit patient.   [LD]      ED Course User Index  [LD] Rebecca Rousseau MD     Clinical Impression:   The primary encounter diagnosis was Chest pain. Diagnoses of Acute renal failure, unspecified acute renal failure type, Hyperkalemia, Hypoxemia, and Acute renal failure superimposed on chronic kidney disease, unspecified CKD stage, unspecified acute renal  failure type were also pertinent to this visit.    Disposition:   Disposition: Admitted      I, Rebecca Rousseau,  personally performed the services described in this documentation. All medical record entries made by the scribe were at my direction and in my presence.  I have reviewed the chart and agree that the record reflects my personal performance and is accurate and complete. Rebecca Rousseau M.D. 5:58 AM03/05/2018                     Rebecca Rousseau MD  03/05/18 0558

## 2018-03-05 NOTE — NURSING
Patient arrived to room in stretcher with all patient belongings. IV clean, dry, and intact. Tele monitor placed on patient reading NSR with a HR of 90. Bed alarm on, bed locked and low, call bell in reach, and side rails up x2. Patient placed on 2L nasal cannula. Patient reports CP as a 10/10 and states that it radiates to bilateral shoulders and back. Patient states that CP is worse when lying down. VSS. No acute distress noted.

## 2018-03-05 NOTE — ED NOTES
Report received from ryan solis patient asleep resting quietly on cardiac monitor continuous bp cuff and pulse ox. vss call light at patients bedside nurse will continue to monitor.

## 2018-03-05 NOTE — PLAN OF CARE
Problem: Patient Care Overview  Goal: Plan of Care Review  Outcome: Ongoing (interventions implemented as appropriate)  The proper method of use, as well as anticipated side effects, of this metered-dose inhaler are discussed and demonstrated to the patient.  Patient on oxygen with documented flow.  Will attempt to wean per O2 order protocol.  Will continue to monitor.

## 2018-03-05 NOTE — PLAN OF CARE
Problem: Patient Care Overview  Goal: Plan of Care Review  Outcome: Ongoing (interventions implemented as appropriate)  Pt on RA with sats of 94%.  Will continue to monitor.

## 2018-03-05 NOTE — ED NOTES
"Pt states "I feel a little better after that nitro pill but I still have pain." pain went from a 9 to and 8.  "

## 2018-03-05 NOTE — H&P
[facility] History and Physical - Resident Note    Admitting Team: Medicine Team A  Attending Physician: Mizty  Resident: Tresa Montelongo  Interns: Christa Perez and Rudy Toro    Date of Admit: 3/5/2018    Chief Complaint     Chest pain x 5 hours    Subjective:      History of Present Illness:  Katie Quevedo is a 72 y.o. female who has COPD, CKD 4, recent PE on eliquis, AoCD, chronic back/neck pain, anxiety, depression. The patient presented to [facility] on 3/5/2018 with a primary complaint of chest pain.    The patient had a recent admission 3 months ago for PE diagnosed by VQ scan and was discharged on eliquis. At that time, presented with subacute BERTRAND that progressed to dyspnea at rest. Since then, her SOB has minimally improved so that she can walk across a room. She was in this USOH until 5 hours ago, when she was laying in bed and had sudden onset of constant, sharp, central chest pain. The pain radiates to her shoulders and back and is worse with taking a deep breath or lying flat. It is associated with mild nausea. She has a chronic cough without acute change. She denies diaphoresis, dizziness/lightheadedness, fever. She denies worsening SOB from baseline.    Past Medical History:  Past Medical History:   Diagnosis Date    Anemia     Bilateral renal cysts     Cataract     Chronic LBP 7/26/2012    Chronic pain     CKD (chronic kidney disease)     CKD (chronic kidney disease) stage 4, GFR 15-29 ml/min     COPD (chronic obstructive pulmonary disease)     Dehydration     Encounter for blood transfusion     HTN (hypertension)     Lumbar spondylosis     Melanoma     of the lip    Metabolic bone disease     Migraines, neuralgic     Osteoporosis     Primary osteoarthritis of both knees     s/p Rt TKA    Seizures     Subdeltoid bursitis, L>R. 9/27/2012    Ulcer     Vitamin D deficiency disease        Past Surgical History:  Past Surgical History:   Procedure Laterality  Date    BLADDER SUSPENSION      CATARACT EXTRACTION  11/18/13    left eye    CERVICAL LAMINECTOMY      x3, fusion x1    COLONOSCOPY  2009    HYSTERECTOMY      JOINT REPLACEMENT  2001    total right knee     LUMBAR LAMINECTOMY      x 3, fusion x1    OOPHORECTOMY         Allergies:  Review of patient's allergies indicates:   Allergen Reactions    Aspirin      Other reaction(s): hx of ulcers    Penicillins      Other reaction(s): Hives  Other reaction(s): Rash  Other reaction(s): Rash  Other reaction(s): Hives    Tetracycline      Other reaction(s): Swelling       Home Medications:  Prior to Admission medications    Medication Sig Start Date End Date Taking? Authorizing Provider   apixaban 5 mg Tab Take 1 tablet (5 mg total) by mouth 2 (two) times daily. 12/14/17 6/12/18  Nicolas Berrios MD   diazePAM (VALIUM) 5 MG tablet Take 1 tablet (5 mg total) by mouth daily as needed for Anxiety. 1/22/18   Clemencia Gambino MD   fluticasone (FLONASE) 50 mcg/actuation nasal spray 1 spray by Each Nare route once daily. 10/19/17   Ana Baldwin MD   fluticasone-salmeterol 250-50 mcg/dose (ADVAIR) 250-50 mcg/dose diskus inhaler Inhale 1 puff into the lungs 2 (two) times daily. 9/24/17   Bridget Nicole MD   hydrocodone-acetaminophen 10-325mg (NORCO)  mg Tab Take 1 tablet by mouth 3 (three) times daily as needed. 2/21/18   Pushpa Mcmahon MD   ipratropium-albuterol (COMBIVENT)  mcg/actuation inhaler Inhale 1 puff into the lungs every 4 (four) hours as needed for Wheezing. Rescue 9/24/17   Bridget Nicole MD   lisinopril 10 MG tablet Take 1 tablet (10 mg total) by mouth once daily. 2/6/18 2/6/19  Clemencia Gambino MD   morphine (MS CONTIN) 15 MG 12 hr tablet Take 15 mg by mouth 2 (two) times daily. 12/8/17   Historical Provider, MD   oxyCODONE-acetaminophen (PERCOCET)  mg per tablet Take 1 tablet by mouth 3 (three) times daily as needed. 12/14/17   Historical Provider, MD   paroxetine  "(PAXIL) 30 MG tablet Take 30 mg by mouth once daily. 2 tablets daily at night    Historical Provider, MD   tiZANidine (ZANAFLEX) 4 MG tablet TAKE 1-2 TABLETS BY MOUTH THREE TIMES A DAY AS NEEDED 17   Pushpa Mcmahon MD   trazodone (DESYREL) 50 MG tablet TAKE 1-2 TABLETS BY MOUTH NIGHTLY AS NEEDED FOR INSOMNIA 17   Pushpa Mcmahon MD   triamcinolone acetonide 0.1% (KENALOG) 0.1 % cream Use hs prn itching 3/2/16   Betty Méndez MD       Family History:  Family History   Problem Relation Age of Onset    Arthritis Mother     Stroke Mother     Hypertension Father     Cancer Father     Cataracts Father     Diabetes Maternal Aunt     Hypertension Maternal Grandfather     Heart disease Maternal Grandfather     Heart attack Maternal Grandfather     Cataracts Sister     Glaucoma Cousin        Social History:  Social History   Substance Use Topics    Smoking status: Former Smoker     Types: Cigarettes    Smokeless tobacco: Never Used    Alcohol use Yes      Comment: Rare       Review of Systems:  Pertinent positives and negatives are listed in HPI.  All other systems are reviewed and are negative.    Health Maintaince :   Primary Care Physician: [facility]  Immunizations:   TDap is up to date.  Influenza is up to date.  Pneumovax is up to date.  Cancer Screening:  Colonoscopy: is up to date. 2013 polyp; due      Objective:   Last 24 Hour Vital Signs:  BP  Min: 105/50  Max: 149/54  Temp  Av.9 °F (36.6 °C)  Min: 97.9 °F (36.6 °C)  Max: 97.9 °F (36.6 °C)  Pulse  Av.7  Min: 72  Max: 85  Resp  Av.5  Min: 17  Max: 22  SpO2  Av.5 %  Min: 88 %  Max: 99 %  Height  Av' 2" (157.5 cm)  Min: 5' 2" (157.5 cm)  Max: 5' 2" (157.5 cm)  Weight  Av.6 kg (127 lb)  Min: 57.6 kg (127 lb)  Max: 57.6 kg (127 lb)  Body mass index is 23.23 kg/m².  No intake/output data recorded.    Physical Examination:  General: Alert and awake in NAD  Head:  Normocephalic and atraumatic  Eyes:  PERRL; " EOMi with anicteric sclera and clear conjunctivae  Mouth:  Oropharynx clear and without exudate; moist mucous membranes  Cardio:  Chest wall TTP  Regular rate and rhythm with normal S1 and S2; no murmurs or rubs  Resp:  Shallow breaths, mild bibasilar crackles R>L  Abdom: Stated that palpation of abdomen worsened chest pain; Soft, ND with normoactive bowel sounds  Extrem: WWP with no clubbing, cyanosis or edema  Skin:  No rashes, lesions, or color changes  Pulses: 2+ and symmetric distally  Neuro:  AAOx3; cooperative and pleasant with no focal deficits    Laboratory:  Most Recent Data:  CBC: Lab Results   Component Value Date    WBC 13.55 (H) 03/05/2018    HGB 10.3 (L) 03/05/2018    HCT 32.7 (L) 03/05/2018     03/05/2018    MCV 96 03/05/2018    RDW 13.7 03/05/2018     BMP: Lab Results   Component Value Date     03/05/2018    K 5.7 (H) 03/05/2018     03/05/2018    CO2 21 (L) 03/05/2018    BUN 72 (H) 03/05/2018    CREATININE 3.0 (H) 03/05/2018    GLU 95 03/05/2018    CALCIUM 9.4 03/05/2018    MG 2.0 12/14/2017    PHOS 4.5 12/14/2017     LFTs: Lab Results   Component Value Date    PROT 7.2 03/05/2018    ALBUMIN 3.9 03/05/2018    BILITOT 0.2 03/05/2018    AST 21 03/05/2018    ALKPHOS 83 03/05/2018    ALT 12 03/05/2018     Coags:   Lab Results   Component Value Date    INR 0.9 03/05/2018     Urinalysis: Lab Results   Component Value Date    LABURIN No growth 10/18/2017    COLORU Yellow 03/05/2018    SPECGRAV <=1.005 (A) 03/05/2018    NITRITE Negative 03/05/2018    PROTEINUR trace 10/08/2014    KETONESU Negative 03/05/2018    UROBILINOGEN Negative 03/05/2018    BILIRUBINUR negative 10/08/2014    WBCUA 2 03/05/2018       Trended Lab Data:    Recent Labs  Lab 03/05/18  0313   WBC 13.55*   HGB 10.3*   HCT 32.7*      MCV 96   RDW 13.7      K 5.7*      CO2 21*   BUN 72*   CREATININE 3.0*   GLU 95   PROT 7.2   ALBUMIN 3.9   BILITOT 0.2   AST 21   ALKPHOS 83   ALT 12       Trended Cardiac  Data:    Recent Labs  Lab 03/05/18  0313   TROPONINI <0.006   BNP 81     Other Results:  EKG (my interpretation):   NSR; some mild ID depression II, III, avf    Radiology:  Imaging Results          X-Ray Chest PA And Lateral (Final result)  Result time 03/05/18 03:55:10    Final result by Sarahy Piper MD (03/05/18 03:55:10)                 Impression:        No significant detrimental interval change from prior exam or radiographic evidence of acute intra-thoracic process.      Electronically signed by: SARAHY PIPER  Date:     03/05/18  Time:    03:55              Narrative:    Comparison: 12/13/2017    Technique: PA and lateral radiographs of the chest.    Findings: Cardiac monitoring leads overlie the chest. The cardiomediastinal silhouette is within normal limits. The visualized airway is unremarkable.  The lungs appear mildly hyperexpanded. There is subsegmental atelectasis at the right lung base. No definite focal airspace consolidation or pleural effusion. There is no pneumothorax. There are postsurgical changes of the cervical spine. The remaining osseous structures appear intact.                            Assessment:     Katie Quevedo is a 72 y.o. female with:     Plan:     Chest pain  -Presented with 5 hours of constant, sharp, pleuritic chest pain, radiating to back and shoulders, worse with lying flat  Chest wall TTP  -CXR without acute findings, EKG without ischemic changes, Initial trop negative, BNP 81  Received ASA, nitro without relief  Vitals stable  -Possible etiologies include ACS, PE, pericarditis, musculoskeletal  -Little utility to d-dimer or repeat V/Q scan given recent PE  Unable to perform CTA 2/2 CKD  -Recent TTE with normal LV & RV systolic fxn, grade 1 diastolic d/f  Repeat pending  -Trending troponins, EKGs  GI cocktail and nitroglycerin for pain    Recent PE on eliquis  -Admitted 12/17-Dx with PE by V/Q scan  Compliant with eliquis  -BERTRAND stable/minimally improved since  discharge--able to walk across room  Follows with [facility] pulm  -Continue eliquis, but will decrease dose to 2.5 mg BID    YOSVANY on CKD 4  -On admit, BUN 72, Cr 3, eGFR 15  1/18--Cr 2, eGFR 24  -Follows with nephrology (Aura Diggs)  Recently started lisinopril for proteniuria--holding  -FENa 0.5% c/w prerenal  Renal US pending    Hyperkalemia  -On admit, K 5.7  No EKG changes  -Given calcium gluconate, albuterol, insulin, and kayexalate  Monitor    Leukocytosis  -On admit, WBC 13.5  No other signs/symptoms of infection  Monitor    Normocytic Anemia  -H/H 10.3/32.7, MCV 96--at baseline  -iron studies 9/17 consistent with AoCD  Folate, B12, TSH recently wnl     Chronic hypoxic respiratory failure, COPD  -Not on home O2, though recent clinic notes document desat to 77% with minimal ambulation  Severely limited by BERTRAND 2/2 PE and COPD  On admit, sats 88% on RA at rest, ABG 7.34/40/65/21.6/RA  -On combivent PRN and advair daily  Follows with [facility] pulm  -Will eval for home O2 prior to discharge     Chronic Pain  -hx of multiple back surgeries and neck surgeries  -on percocet, MS contin and zanaflex at home  -continued percocet PRN     Anxiety/Depression  -continued home paxil  -trazadone PRN    Code Status:     Full    Fluids: none  Diet: NPO  Ppx: jose Perez  [facility] Internal Medicine HO-I  [facility] Service    [facility] Medicine Hospitalist Pager numbers:   [facility] Hospitalist Medicine Team A (Mitzy/Benjamin): [phone]  [facility] Hospitalist Medicine Team B (Aubrie/Judit):  [phone]

## 2018-03-05 NOTE — NURSING
EKG performed by respiratory therapist. EKG reads STEMI. Dr. Campa's team notified. Physician at bedside to read EKG. Will speak to cardiology to discuss plan of care. Patient reports worsening chest pain. No acute distress noted. Will continue to monitor.

## 2018-03-05 NOTE — ED NOTES
"Pt c/o SOB with CP that radiates to her left shoulder and upper back that began around midnight. PT  gave her two 81mg ASA PTA. rr labored upon assessment. Pt states "my breathing is better when I am sitting up." pt c/o intermediate nausea. Pt denies vomiting, headache, or dizziness at this time.   "

## 2018-03-06 ENCOUNTER — NURSE TRIAGE (OUTPATIENT)
Dept: ADMINISTRATIVE | Facility: CLINIC | Age: 73
End: 2018-03-06

## 2018-03-06 VITALS
DIASTOLIC BLOOD PRESSURE: 55 MMHG | BODY MASS INDEX: 23.37 KG/M2 | HEART RATE: 79 BPM | TEMPERATURE: 98 F | HEIGHT: 62 IN | SYSTOLIC BLOOD PRESSURE: 109 MMHG | WEIGHT: 127 LBS | OXYGEN SATURATION: 97 % | RESPIRATION RATE: 18 BRPM

## 2018-03-06 PROBLEM — I30.0 ACUTE IDIOPATHIC PERICARDITIS: Status: ACTIVE | Noted: 2018-03-06

## 2018-03-06 LAB
ANION GAP SERPL CALC-SCNC: 10 MMOL/L
ANION GAP SERPL CALC-SCNC: 10 MMOL/L
ANION GAP SERPL CALC-SCNC: 12 MMOL/L
ANION GAP SERPL CALC-SCNC: 9 MMOL/L
BUN SERPL-MCNC: 62 MG/DL
BUN SERPL-MCNC: 64 MG/DL
BUN SERPL-MCNC: 67 MG/DL
BUN SERPL-MCNC: 68 MG/DL
CALCIUM SERPL-MCNC: 8.9 MG/DL
CALCIUM SERPL-MCNC: 9 MG/DL
CALCIUM SERPL-MCNC: 9.2 MG/DL
CALCIUM SERPL-MCNC: 9.3 MG/DL
CHLORIDE SERPL-SCNC: 106 MMOL/L
CHLORIDE SERPL-SCNC: 107 MMOL/L
CHLORIDE SERPL-SCNC: 107 MMOL/L
CHLORIDE SERPL-SCNC: 108 MMOL/L
CO2 SERPL-SCNC: 17 MMOL/L
CO2 SERPL-SCNC: 18 MMOL/L
CO2 SERPL-SCNC: 20 MMOL/L
CO2 SERPL-SCNC: 24 MMOL/L
CREAT SERPL-MCNC: 2.9 MG/DL
CREAT SERPL-MCNC: 3 MG/DL
EST. GFR  (AFRICAN AMERICAN): 17 ML/MIN/1.73 M^2
EST. GFR  (AFRICAN AMERICAN): 18 ML/MIN/1.73 M^2
EST. GFR  (NON AFRICAN AMERICAN): 15 ML/MIN/1.73 M^2
GLUCOSE SERPL-MCNC: 111 MG/DL
GLUCOSE SERPL-MCNC: 115 MG/DL
GLUCOSE SERPL-MCNC: 73 MG/DL
GLUCOSE SERPL-MCNC: 98 MG/DL
POCT GLUCOSE: 110 MG/DL (ref 70–110)
POTASSIUM SERPL-SCNC: 4.5 MMOL/L
POTASSIUM SERPL-SCNC: 4.5 MMOL/L
POTASSIUM SERPL-SCNC: 5.5 MMOL/L
POTASSIUM SERPL-SCNC: 5.5 MMOL/L
SODIUM SERPL-SCNC: 135 MMOL/L
SODIUM SERPL-SCNC: 135 MMOL/L
SODIUM SERPL-SCNC: 139 MMOL/L
SODIUM SERPL-SCNC: 139 MMOL/L

## 2018-03-06 PROCEDURE — 25000003 PHARM REV CODE 250: Performed by: STUDENT IN AN ORGANIZED HEALTH CARE EDUCATION/TRAINING PROGRAM

## 2018-03-06 PROCEDURE — 94640 AIRWAY INHALATION TREATMENT: CPT

## 2018-03-06 PROCEDURE — 36415 COLL VENOUS BLD VENIPUNCTURE: CPT

## 2018-03-06 PROCEDURE — G0378 HOSPITAL OBSERVATION PER HR: HCPCS

## 2018-03-06 PROCEDURE — 25000003 PHARM REV CODE 250: Performed by: INTERNAL MEDICINE

## 2018-03-06 PROCEDURE — 63600175 PHARM REV CODE 636 W HCPCS: Performed by: INTERNAL MEDICINE

## 2018-03-06 PROCEDURE — 27000221 HC OXYGEN, UP TO 24 HOURS

## 2018-03-06 PROCEDURE — 25000242 PHARM REV CODE 250 ALT 637 W/ HCPCS: Performed by: INTERNAL MEDICINE

## 2018-03-06 PROCEDURE — 80048 BASIC METABOLIC PNL TOTAL CA: CPT

## 2018-03-06 PROCEDURE — 80048 BASIC METABOLIC PNL TOTAL CA: CPT | Mod: 91

## 2018-03-06 PROCEDURE — 94761 N-INVAS EAR/PLS OXIMETRY MLT: CPT

## 2018-03-06 PROCEDURE — 94645 CONT INHLJ TX EACH ADDL HOUR: CPT

## 2018-03-06 RX ORDER — ALBUTEROL SULFATE 0.83 MG/ML
10 SOLUTION RESPIRATORY (INHALATION) ONCE
Status: COMPLETED | OUTPATIENT
Start: 2018-03-06 | End: 2018-03-06

## 2018-03-06 RX ORDER — PREDNISONE 10 MG/1
TABLET ORAL
Qty: 66 TABLET | Refills: 0 | Status: SHIPPED | OUTPATIENT
Start: 2018-03-06 | End: 2018-04-26

## 2018-03-06 RX ADMIN — SODIUM POLYSTYRENE SULFONATE 30 G: 15 SUSPENSION ORAL; RECTAL at 04:03

## 2018-03-06 RX ADMIN — OXYCODONE HYDROCHLORIDE AND ACETAMINOPHEN 1 TABLET: 10; 325 TABLET ORAL at 01:03

## 2018-03-06 RX ADMIN — APIXABAN 2.5 MG: 2.5 TABLET, FILM COATED ORAL at 08:03

## 2018-03-06 RX ADMIN — PAROXETINE 30 MG: 10 TABLET, FILM COATED ORAL at 08:03

## 2018-03-06 RX ADMIN — FLUTICASONE FUROATE AND VILANTEROL TRIFENATATE 1 PUFF: 100; 25 POWDER RESPIRATORY (INHALATION) at 07:03

## 2018-03-06 RX ADMIN — OXYCODONE HYDROCHLORIDE AND ACETAMINOPHEN 1 TABLET: 10; 325 TABLET ORAL at 08:03

## 2018-03-06 RX ADMIN — ALBUTEROL SULFATE 10 MG: 2.5 SOLUTION RESPIRATORY (INHALATION) at 08:03

## 2018-03-06 RX ADMIN — CALCIUM GLUCONATE 1000 MG: 98 INJECTION, SOLUTION INTRAVENOUS at 09:03

## 2018-03-06 NOTE — PLAN OF CARE
Pt c/o of chest pain that radiates up to bilat shoulders and her back. 02 sat 88% on 1L 02, 02 increased to 2L. /50. HR 82. Spoke with Dr. Perez whom states to give her the PRN Oxycodone-Acetaminophen PO. Will cont to monitor.

## 2018-03-06 NOTE — PLAN OF CARE
Home Oxygen Evaluation    Date Performed: 3/6/2018    1) Patient's Home O2 Sat on room air, while at rest: 89%        If O2 sats on room air at rest are 88% or below, patient qualifies. No additional testing needed. Document N/A in steps 2 and 3. If 89% or above, complete steps 2.      2) Patient's O2 Sat on room air while exercisin%        If O2 sats on room air while exercising remain 89% or above patient does not qualify, no further testing needed Document N/A in step 3. If O2 sats on room air while exercising are 88% or below, continue to step 3.      3) Patient's O2 Sat while exercising on O2: 92% at 2 LPM         (Must show improvement from #2 for patients to qualify)    If O2 sats improve on oxygen, patient qualifies for portable oxygen. If not, the patient does not qualify.

## 2018-03-06 NOTE — DISCHARGE SUMMARY
[facility] Discharge Summary    Primary Team: [facility] Team A  Attending Physician: Adelina Forrester MD  Resident: Reginald  Intern: Cortez    Date of Admit: 3/5/2018  Date of Discharge: 3/6/2018    Discharge to: home  Condition: stable    Discharge Diagnoses     Patient Active Problem List   Diagnosis    Melanoma    Chronic pain    Lumbar spondylosis    Vitamin deficiency    Cervical post-laminectomy syndrome    Lumbar postlaminectomy syndrome    Chronic neck pain    Lumbosacral spondylosis without myelopathy    Isolated cervical dystonia    Primary osteoarthritis of both knees    Subdeltoid bursitis, L>R.    Other fragments of torsion dystonia    Nuclear sclerosis - Both Eyes    Rotator cuff tear, right    Muscle spasm    Intractable migraine    Biceps tendonitis    COPD (chronic obstructive pulmonary disease) with chronic bronchitis    Osteoarthritis, hip, bilateral    Lumbar radiculopathy, BLE    Cervical radiculopathy, BUE    Osteoporosis    Chronic low back pain    Pruritus    Hypoxia    Pulmonary embolus    Iron deficiency anemia    Essential hypertension    CKD (chronic kidney disease), stage IV    Atrophy of left kidney    Kidney cysts    Supplemental oxygen dependent    History of colon polyps    COPD (chronic obstructive pulmonary disease)    Chest pain    Acute kidney failure    Hyperkalemia    Acute on chronic respiratory failure with hypoxia    Acute idiopathic pericarditis       Consultants and Procedures     Consultants:  none    Procedures:   VQ: High probability VQ scan for pulmonary embolism as evidenced by 2 large wedge-shaped perfusion defects in the right lung.    Medical-renal disease. The left kidney is particularly small in size with decrease cortical medullary differentiation and cortical thinning. No hydronephrosis bilaterally.    Le ultrasound:  No dvt    Brief History of Present Illness      Katie Quevedo is a 72 y.o.  female who  has a past medical history of  Anemia; Bilateral renal cysts; Cataract; Chronic LBP (7/26/2012); Chronic pain; CKD (chronic kidney disease); CKD (chronic kidney disease) stage 4, GFR 15-29 ml/min; COPD (chronic obstructive pulmonary disease); Dehydration; Encounter for blood transfusion; HTN (hypertension); Lumbar spondylosis; Melanoma; Metabolic bone disease; Migraines, neuralgic; Osteoporosis; Primary osteoarthritis of both knees; Seizures; Subdeltoid bursitis, L>R. (9/27/2012); Ulcer; and Vitamin D deficiency disease.  The patient presented to [facility] on 3/5/2018 with a primary complaint of Shortness of Breath (To ER with c/o SOB and chest pain, shoulder pain and upper back pain starting before midnight.)  .     Patient comes in with constant chest pain associated with sob.  Pain worsens with laying flat and increases with deep breath.    For the full HPI please refer to the History & Physical from this admission.    Hospital Course By Problem with Pertinent Findings     Chest pain 2/2 Acute pericarditis and Pulmonary Infarcts  -troponins have been negative, ekgs consistent with pericarditis  -prednisone taper in place as we have to use this because can't use colchicine or nsaids 2/2 yosvany  -conitinue eliquis; this is now renally dosed     Recent PE on eliquis  -Admitted 12/17-Dx with PE by V/Q scan  Compliant with eliquis  -BERTRAND stable/minimally improved since discharge--able to walk across room  Follows with [facility] pulm-should follow up  -Continue eliquis, but will decrease dose to 2.5 mg BID  -patient will be discharged home on oxygen     YOSVANY on CKD 4-improving  -On admit, BUN 72, Cr 3, eGFR 15  1/18--Cr 2, eGFR 24 cr   -Follows with nephrology (Aura Diggs)  Recently started lisinopril for proteniuria--holding and will continue to hold on discharge  -FENa 0.5% c/w prerenal didn't improve after fluids  Renal US with left small kidney with decrease cortical medullary differentation and cortical thinning; no  hydronephrosis     Hyperkalemia-resolved  -On admit, K 5.7 peaked at 6.3 4.5 on discharge   No EKG changes  -Given calcium gluconate, albuterol, insulin, and kayexalate     Leukocytosis  -On admit, WBC 13.5  No other signs/symptoms of infection     Normocytic Anemia  -H/H 10.3/32.7, MCV 96--at baseline  -iron studies 9/17 consistent with AoCD  Folate, B12, TSH recently wnl     Chronic hypoxic respiratory failure, COPD  -patient sent home on oxygen as sats on room air 89% but 85% with exertion.     Chronic Pain  -hx of multiple back surgeries and neck surgeries  -on percocet, and zanaflex at home  -continued percocet PRN, zanaflex prn     Anxiety/Depression  -continued home paxil    Discharge Medications        Medication List      START taking these medications    predniSONE 10 MG tablet  Commonly known as:  DELTASONE  Take 40 mg po q day for five days days then take 30 mg po q day for seven days then take 20 mg po q day for seven days then take 10 mg po q day for seven days then take 5 mg po q day for seven days and then stop therapy        CHANGE how you take these medications    * apixaban 5 mg Tab  Take 1 tablet (5 mg total) by mouth 2 (two) times daily.  What changed:  Another medication with the same name was added. Make sure you understand how and when to take each.     * apixaban 2.5 mg Tab  Take 1 tablet (2.5 mg total) by mouth 2 (two) times daily.  What changed:  You were already taking a medication with the same name, and this prescription was added. Make sure you understand how and when to take each.        * This list has 2 medication(s) that are the same as other medications prescribed for you. Read the directions carefully, and ask your doctor or other care provider to review them with you.            CONTINUE taking these medications    diazePAM 5 MG tablet  Commonly known as:  VALIUM  Take 1 tablet (5 mg total) by mouth daily as needed for Anxiety.     fluticasone 50 mcg/actuation nasal  spray  Commonly known as:  FLONASE  1 spray by Each Nare route once daily.     fluticasone-salmeterol 250-50 mcg/dose 250-50 mcg/dose diskus inhaler  Commonly known as:  ADVAIR  Inhale 1 puff into the lungs 2 (two) times daily.     ipratropium-albuterol  mcg/actuation inhaler  Commonly known as:  COMBIVENT  Inhale 1 puff into the lungs every 4 (four) hours as needed for Wheezing. Rescue     oxyCODONE-acetaminophen  mg per tablet  Commonly known as:  PERCOCET     paroxetine 30 MG tablet  Commonly known as:  PAXIL     tiZANidine 4 MG tablet  Commonly known as:  ZANAFLEX  TAKE 1-2 TABLETS BY MOUTH THREE TIMES A DAY AS NEEDED     traZODone 50 MG tablet  Commonly known as:  DESYREL  TAKE 1-2 TABLETS BY MOUTH NIGHTLY AS NEEDED FOR INSOMNIA     triamcinolone acetonide 0.1% 0.1 % cream  Commonly known as:  KENALOG  Use hs prn itching        STOP taking these medications    hydrocodone-acetaminophen 10-325mg  mg Tab  Commonly known as:  NORCO     lisinopril 10 MG tablet     morphine 15 MG 12 hr tablet  Commonly known as:  MS CONTIN           Where to Get Your Medications      You can get these medications from any pharmacy    Bring a paper prescription for each of these medications  · apixaban 2.5 mg Tab  · predniSONE 10 MG tablet         Discharge Information:   Diet:  Low potassium diet-information printed out and given to patient    Physical Activity:  As tolerated    Instructions:  1. Take all medications as prescribed  2. Keep all follow-up appointments  3. Return to the hospital or call your primary care physicians if any worsening symptoms such as shortness of breath or worsening chest pain occur.      Follow-Up Appointments:  Pulm within 1 month, cardiology-TBD, pcp within 1-2 days for bp check off of lisinopril and potassium and creatinine check    Tresa Montelongo  [facility], HO-4

## 2018-03-06 NOTE — PLAN OF CARE
TN spoke with Dr. Perez. Informed her patient does qualify for oxygen. Nurse completed oxygen assessment.   MD will order oxygen for patient.    --Patient's oxygen order in. TN informed team need a qualifying diagnosis.    Update: 0527--TN faxed oxygen order to [facility].  1429: TN confirmed with Carmen at [facility] they have received order. Jayshree needs to review it.    1442--Jayshree from [facility] stated ok to pull oxygen tank for patient.    Future Appointments  Date Time Provider Department Center   4/9/2018 2:20 PM Pushpa Mcmahon MD [facility]       Tiffany Kim RN  Transition Navigator  [phone]

## 2018-03-06 NOTE — PLAN OF CARE
Pt is being discharged. Discharge teaching was reviewed with patient and family. Pt verbalized understanding. Refer to clinical references for pt education. IV removed, tip intact, pt tolerated well. Cardiac monitor removed. Awaiting O2 delivery.

## 2018-03-06 NOTE — PLAN OF CARE
Patient is discharged to home. Oxygen to be delivered to room.    TN met with patient and  at bedside. They were education on oxygen and paperwork signed. All questions answered. TN informed her to call [facility] on their way home to have concentrator delivered. Patient and  both verbalized understanding.     Patient is aware of [facility] appointment. TN left message for cardiology and pulmonary doctor's office to schedule appointment.    Future Appointments  Date Time Provider Department Center   3/14/2018 10:00 AM Clemencia Gambino MD [facility] [place] [facility]   4/9/2018 2:20 PM Pushpa Mcmahon MD [facility]     Follow-up With  Details  Why  Contact Info   Jin Morrow MD  In 2 weeks   left message to schedule. hospital follow up pericarditis  [address]  [address]  [place] [address]  [phone]   Lissette Donato MD  Schedule an appointment as soon as possible for a visit in 1 week  Message left for nurse--They will call you to schedule. Follow up for pulmonary embolism/infarct  [address]  [address]  [place] [address]  [phone]   Aura Diggs MD  Schedule an appointment as soon as possible for a visit    [address]  [address]  [facility]  [place] [address]  [phone]   Clemencia Gambino MD  On 3/14/2018  at 10:00 am, [facility]  [address]  [address]  [place] [address]  [phone]   [facility]    [employer]  [address]  [address]  [place] [facility] [address]  [phone]         03/06/18 1323   Final Note   Assessment Type Final Discharge Note   Discharge Disposition Home   What phone number can be called within the next 1-3 days to see how you are doing after discharge? [phone]   Hospital Follow Up  Appt(s) scheduled? Yes   Discharge plans and expectations educations in teach back method with documentation complete? Yes   Right Care Referral Info   Post Acute  Recommendation No Care     Tiffany Kim RN  Transition Navigator  [phone]

## 2018-03-06 NOTE — PROGRESS NOTES
"[facility] Resident HO-4 Progress Note    Subjective:      Katie Quevedo is a 72 y.o.  female who is being followed by the [facility] IM service at [facility] for ARF and pericarditis as well as pulmonary infarct. The patient still continues to have chest pain.  It improves with prn opiates and she notices a minor improvement since intiiation of steroids yesterday.     Objective:   Last 24 Hour Vital Signs:  BP  Min: 91/53  Max: 139/62  Temp  Av.1 °F (36.7 °C)  Min: 97.7 °F (36.5 °C)  Max: 98.5 °F (36.9 °C)  Pulse  Av.4  Min: 74  Max: 103  Resp  Av.4  Min: 14  Max: 20  SpO2  Av.6 %  Min: 89 %  Max: 96 %  Height  Av' 2" (157.5 cm)  Min: 5' 2" (157.5 cm)  Max: 5' 2" (157.5 cm)  Weight  Av.6 kg (126 lb 15.9 oz)  Min: 57.6 kg (126 lb 15.8 oz)  Max: 57.6 kg (127 lb)  I/O last 3 completed shifts:  In: 2690 [P.O.:90; I.V.:2500; IV Piggyback:100]  Out: -     Physical Examination:     General:          Alert and awake in NAD  Head:               Normocephalic and atraumatic  Eyes:               PERRL; EOMi with anicteric sclera and clear conjunctivae  Mouth:             Oropharynx clear and without exudate; moist mucous membranes  Cardio:             Chest wall TTP  Regular rate and rhythm with normal S1 and S2; no murmurs or rubs  Resp:               Shallow breaths, mild bibasilar crackles R>L  Abdom:            Stated that palpation of abdomen worsened chest pain; Soft, ND with normoactive bowel sounds  Extrem:            WWP with no clubbing, cyanosis or edema  Skin:                No rashes, lesions, or color changes  Pulses:            2+ and symmetric distally  Neuro:             AAOx3; cooperative and pleasant with no focal deficits    Laboratory:  Laboratory Data Reviewed: yes  Pertinent Findings:  Recent Labs      18   0313   WBC  13.55*   HGB  10.3*   HCT  32.7*   PLT  286   MCV  96   RDW  13.7   GRAN  62.8  8.5*   LYMPH  25.3  3.4   MONO  6.3  0.9   EOS  0.7*   BASO  " 0.05   EOSINOPHIL  4.9   BASOPHIL  0.4     Recent Labs      03/05/18   1337  03/05/18   1529  03/05/18   1933  03/06/18   0120  03/06/18   0416   NA  135*  136  135*  135*  135*  135*   K  6.3*  6.3*  5.6*  5.6*  5.5*  5.5*   CL  106  106  106  106  107  108   CO2  17*  18*  17*  17*  18*  17*   BUN  71*  73*  69*  69*  68*  67*   CREATININE  3.2*  3.1*  3.0*  3.0*  2.9*  2.9*   GLU  191*  111*  168*  168*  115*  98     Recent Labs      03/05/18   0511  03/06/18   0123   POCTGLUCOSE  86  110     Recent Labs      03/05/18   0313   PROT  7.2   ALBUMIN  3.9   BILITOT  0.2   AST  21   ALT  12   ALKPHOS  83         Microbiology Data Reviewed: yes  Pertinent Findings:  none    Other Results:  EKG (my interpretation): diffuse st elevations and pr depression mainly in II, III, avF but lateral leads as well    Radiology Data Reviewed: yes  Pertinent Findings:  No new; echo without abnormality    Current Medications:     Infusions:       Scheduled:   apixaban  2.5 mg Oral BID    fluticasone  1 spray Each Nare Daily    fluticasone-vilanterol  1 puff Inhalation Daily    paroxetine  30 mg Oral Daily        PRN:  dextrose 50%, ondansetron, oxyCODONE-acetaminophen, sodium chloride 0.9%, tiZANidine    Antibiotics and Day Number of Therapy:  none    Lines and Day Number of Therapy:  piv    Assessment:     Katie Quevedo is a 72 y.o.female with  Patient Active Problem List    Diagnosis Date Noted    Chest pain 03/05/2018    Acute kidney failure 03/05/2018    Hyperkalemia 03/05/2018    Acute on chronic respiratory failure with hypoxia 03/05/2018    Supplemental oxygen dependent 02/06/2018    History of colon polyps 02/06/2018    COPD (chronic obstructive pulmonary disease)     CKD (chronic kidney disease), stage IV 01/25/2018    Atrophy of left kidney 01/25/2018    Kidney cysts 01/25/2018    Iron deficiency anemia 01/22/2018    Essential hypertension 01/22/2018    Pulmonary embolus 12/13/2017    Hypoxia      Pruritus 02/20/2016    Chronic low back pain 09/25/2015    Osteoporosis 09/21/2015    Osteoarthritis, hip, bilateral 10/27/2014    Lumbar radiculopathy, BLE 10/27/2014    Cervical radiculopathy, BUE 10/27/2014    COPD (chronic obstructive pulmonary disease) with chronic bronchitis 10/08/2014    Biceps tendonitis 01/06/2014    Muscle spasm 12/18/2013    Intractable migraine 12/18/2013    Rotator cuff tear, right 10/18/2013    Nuclear sclerosis - Both Eyes 08/08/2013    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    Melanoma     Chronic pain     Lumbar spondylosis     Vitamin deficiency         Plan:     Chest pain 2/2 Acute pericarditis and Pulmonary Infarcts  -Presented with 5 hours of constant, sharp, pleuritic chest pain, radiating to back and shoulders, worse with lying flat  Chest wall TTP  -CXR without acute findings, EKG without ischemic changes but pericarditis changes now evident, Initial trop negative, BNP 81  Received ASA, nitro without relief  Vitals stable  -Possible etiologies include ACS, PE, pericarditis, musculoskeletal  -v/q with pulmonary infarcts  Unable to perform CTA 2/2 CKD; continue eliquis  -Recent TTE with normal LV & RV systolic fxn, grade 1 diastolic d/f  Repeat wnl  -troponins remained negative, ekgs with diffuse pericarditis changes.  Started prednisone yesterday as patient cant have colchicine or nsaids 2/2 raj.  Also added home percocet for breakthrough pain     Recent PE on eliquis  -Admitted 12/17-Dx with PE by V/Q scan  Compliant with eliquis  -BERTRAND stable/minimally improved since discharge--able to walk across room  Follows with [facility] pulm  -Continue  eliquis, but will decrease dose to 2.5 mg BID  -patient needs home o2 assessment today     YOSVANY on CKD 4-improving  -On admit, BUN 72, Cr 3, eGFR 15  1/18--Cr 2, eGFR 24  -Follows with nephrology (Aura Diggs)  Recently started lisinopril for proteniuria--holding  -FENa 0.5% c/w prerenal  Renal US with left small kidney with decrease cortical medullary differentation and cortical thinning; no hydronephrosis     Hyperkalemia  -On admit, K 5.7 now 5.5  No EKG changes  -Given calcium gluconate, albuterol, insulin, and kayexalate  Monitor     Leukocytosis  -On admit, WBC 13.5  No other signs/symptoms of infection  Monitor     Normocytic Anemia  -H/H 10.3/32.7, MCV 96--at baseline  -iron studies 9/17 consistent with AoCD  Folate, B12, TSH recently wnl     Chronic hypoxic respiratory failure, COPD  -Not on home O2, though recent clinic notes document desat to 77% with minimal ambulation  Severely limited by BERTRAND 2/2 PE and COPD  On admit, sats 88% on RA at rest, ABG 7.34/40/65/21.6/RA  -On combivent PRN and advair daily  Follows with [facility] pulm  -Will eval for home O2 prior to discharge     Chronic Pain  -hx of multiple back surgeries and neck surgeries  -on percocet, MS contin and zanaflex at home  -continued percocet PRN, zanaflex prn     Anxiety/Depression  -continued home isamaril    Tresa Montelongo  [facility] HO-4  [facility] IM Service Team A    [facility] Medicine Hospitalist Pager numbers:   [facility] Hospitalist Medicine Team A (Mitzy/Benjamin): [phone]  [facility] Hospitalist Medicine Team B (Aubrie/Judit):  [phone]

## 2018-03-06 NOTE — PLAN OF CARE
Problem: Patient Care Overview  Goal: Plan of Care Review  PT SPO2 89% ON ROOM AIR, PLACED ON 2LNC

## 2018-03-06 NOTE — PLAN OF CARE
Problem: Patient Care Overview  Goal: Plan of Care Review  Outcome: Ongoing (interventions implemented as appropriate)  The pt has not slept do to frequent care needs.  She has received several interventions to help bring down her potassium level.  She is on telemetry running normal sinus rhythm without ectopy.  VSS.  She has had complaints of chest pain which was relieved with medication. The pt has had no falls this shift.

## 2018-03-06 NOTE — PLAN OF CARE
TN went to meet with patient, spouse at bedside. Patient is independent and lives with spouse at home. She does not have home health or medical equipment at home, just inhalers. Patient does not have oxygen. TN reviewed progress notes. Patient had a follow-up with Dr. Gambino on 2/6, and oxygen ordered at that time. Patient reported to TN she did not receive oxygen. TN left message for Dr. Gambino nurse. Per notes, office had called her for additional testing. TN spoke with nurse bernie Escalera MD team will assess patient for home oxygen too. TN will continue to follow.    --Per patient, she does not have a pulmonologist either.    Future Appointments  Date Time Provider Department Center   4/9/2018 2:20 PM Pushpa Mcmahon MD [facility]        03/06/18 3749   Discharge Assessment   Assessment Type Discharge Planning Assessment   Confirmed/corrected address and phone number on facesheet? Yes   Assessment information obtained from? Patient   Prior to hospitilization cognitive status: Alert/Oriented   Prior to hospitalization functional status: Independent   Current cognitive status: Alert/Oriented   Current Functional Status: Independent   Facility Arrived From: Home   Lives With spouse   Able to Return to Prior Arrangements yes   Is patient able to care for self after discharge? Yes   Patient's perception of discharge disposition home or selfcare   Readmission Within The Last 30 Days no previous admission in last 30 days   Equipment Currently Used at Home respiratory supplies  (inhalers)   Do you have any problems affording any of your prescribed medications? No   Is the patient taking medications as prescribed? yes   Does the patient have transportation home? Yes   Transportation Available family or friend will provide   Dialysis Name and Scheduled days N/A   Does the patient receive services at the [facility]? No   Discharge Plan A Home with family   Discharge Plan B Home with family;Home Health    Patient/Family In Agreement With Plan yes     Tiffany Kim RN  Transition Navigator  [phone]

## 2018-03-06 NOTE — PLAN OF CARE
Problem: Patient Care Overview  Goal: Plan of Care Review  Outcome: Ongoing (interventions implemented as appropriate)  POC reviewed with patient and family. Patient AAOx4. Patient complains of mid sternal CP rating it a 10/10. Prednisone and Percocet given for pain. EKG done today. Enemas, insulin, dextrose, calcium gluconate, and nebulizers administered to shift potassium. Last potassium was 6.3. Continuous NS maintained. Tele monitor in place reading NSR with HR in the 90s. No red alarms or ectopy noted during shift. Bed alarm on, bed locked and low, side rails up x2, and call bell in reach. No falls or injuries to patient during shift. [facility] scheduled for 1930. Patient and family verbalize clear understanding of POC.

## 2018-03-07 ENCOUNTER — TELEPHONE (OUTPATIENT)
Dept: CARDIOLOGY | Facility: CLINIC | Age: 73
End: 2018-03-07

## 2018-03-07 NOTE — TELEPHONE ENCOUNTER
----- Message from Tiffany Kim RN sent at 3/6/2018  1:13 PM CST -----  Hello,       Patient is discharging today. Can you please call the patient to schedule a follow-up. Thanks!    Thanks!  Tiffany Kim RN  Transition Navigator  [phone]

## 2018-03-07 NOTE — TELEPHONE ENCOUNTER
Left message to return the call for further assistance    Reason for Disposition   Message left on unidentified answering machine.  Answering service notified.    Protocols used: ST NO CONTACT OR DUPLICATE CONTACT CALL-A-AH

## 2018-03-07 NOTE — TELEPHONE ENCOUNTER
Appointment booked  Message left on her home phone and appointment slip mailed to patient at her home address on file.

## 2018-03-14 ENCOUNTER — OFFICE VISIT (OUTPATIENT)
Dept: PRIMARY CARE CLINIC | Facility: CLINIC | Age: 73
End: 2018-03-14
Payer: MEDICARE

## 2018-03-14 ENCOUNTER — LAB VISIT (OUTPATIENT)
Dept: LAB | Facility: HOSPITAL | Age: 73
End: 2018-03-14
Attending: INTERNAL MEDICINE
Payer: MEDICARE

## 2018-03-14 VITALS
HEART RATE: 104 BPM | TEMPERATURE: 97 F | SYSTOLIC BLOOD PRESSURE: 132 MMHG | DIASTOLIC BLOOD PRESSURE: 74 MMHG | WEIGHT: 136.56 LBS | OXYGEN SATURATION: 99 % | BODY MASS INDEX: 24.98 KG/M2

## 2018-03-14 DIAGNOSIS — J96.21 ACUTE ON CHRONIC RESPIRATORY FAILURE WITH HYPOXIA: ICD-10-CM

## 2018-03-14 DIAGNOSIS — C43.0 MALIGNANT MELANOMA OF LIP: ICD-10-CM

## 2018-03-14 DIAGNOSIS — I26.99 PULMONARY INFARCT: Primary | ICD-10-CM

## 2018-03-14 DIAGNOSIS — Z79.01 CURRENT USE OF LONG TERM ANTICOAGULATION: ICD-10-CM

## 2018-03-14 DIAGNOSIS — I26.99 OTHER ACUTE PULMONARY EMBOLISM WITHOUT ACUTE COR PULMONALE: ICD-10-CM

## 2018-03-14 DIAGNOSIS — J44.89 COPD (CHRONIC OBSTRUCTIVE PULMONARY DISEASE) WITH CHRONIC BRONCHITIS: Chronic | ICD-10-CM

## 2018-03-14 DIAGNOSIS — I30.0 ACUTE IDIOPATHIC PERICARDITIS: ICD-10-CM

## 2018-03-14 DIAGNOSIS — N17.9 ACUTE RENAL FAILURE, UNSPECIFIED ACUTE RENAL FAILURE TYPE: ICD-10-CM

## 2018-03-14 DIAGNOSIS — R10.9 RIGHT FLANK PAIN: ICD-10-CM

## 2018-03-14 DIAGNOSIS — Z99.81 SUPPLEMENTAL OXYGEN DEPENDENT: ICD-10-CM

## 2018-03-14 LAB
BILIRUB UR QL STRIP: NEGATIVE
CLARITY UR: CLEAR
COLOR UR: YELLOW
GLUCOSE UR QL STRIP: NEGATIVE
HGB UR QL STRIP: NEGATIVE
KETONES UR QL STRIP: NEGATIVE
LEUKOCYTE ESTERASE UR QL STRIP: NEGATIVE
NITRITE UR QL STRIP: NEGATIVE
PH UR STRIP: 6 [PH] (ref 5–8)
PROT UR QL STRIP: NEGATIVE
SP GR UR STRIP: <=1.005 (ref 1–1.03)
URN SPEC COLLECT METH UR: ABNORMAL
UROBILINOGEN UR STRIP-ACNC: NEGATIVE EU/DL

## 2018-03-14 PROCEDURE — 99499 UNLISTED E&M SERVICE: CPT | Mod: S$GLB,,, | Performed by: INTERNAL MEDICINE

## 2018-03-14 PROCEDURE — 87086 URINE CULTURE/COLONY COUNT: CPT

## 2018-03-14 PROCEDURE — 3075F SYST BP GE 130 - 139MM HG: CPT | Mod: CPTII,S$GLB,, | Performed by: INTERNAL MEDICINE

## 2018-03-14 PROCEDURE — 3078F DIAST BP <80 MM HG: CPT | Mod: CPTII,S$GLB,, | Performed by: INTERNAL MEDICINE

## 2018-03-14 PROCEDURE — 99214 OFFICE O/P EST MOD 30 MIN: CPT | Mod: S$GLB,,, | Performed by: INTERNAL MEDICINE

## 2018-03-14 PROCEDURE — 99999 PR PBB SHADOW E&M-EST. PATIENT-LVL III: CPT | Mod: PBBFAC,,, | Performed by: INTERNAL MEDICINE

## 2018-03-14 PROCEDURE — 81003 URINALYSIS AUTO W/O SCOPE: CPT

## 2018-03-14 NOTE — PROGRESS NOTES
Subjective:       Patient ID: Katie Quevedo is a 72 y.o. female.    Chief Complaint:Hospital Follow Up    HPI:  Presents to [facility] for hospital follow up.  Recently admitted for pulmonary infarcts in setting of known pulmonary embolus (diagnosed 12/2017), on anticoagulation with Eliquis.   Pulmonary embolus in 12/2017 appears to be unprovoked.   Hospitalization further complicated by acute pericarditis, and acute kidney injury with hyperkalemia.   Prednisone taper initiated for treatment of pericarditis.  Anticoagulation continued with Eliquis.  Oxygen arranged for home use.  Patient discharged to home.     Unaccompanied today.  Ambulatory and independent with ADL's.  Wearing home oxygen only as needed- not wearing here in office today.  Reports compliance with all medication.     Patient has been non compliant with scheduled pulmonary follow up.  Had 4 recent [facility] appointments, all of which she did not attend.  Placed call to nephrology team to schedule follow up appt on her behalf- patient did not complete all the ordered testing from last office visit. They will arrange for her to complete the testing and have another office appt.     Review of Systems  General ROS: negative for chills, fever or weight loss  Psychological ROS: negative for hallucination, depression or suicidal ideation  + anxiety, insomnia   Ophthalmic ROS: negative for blurry vision, photophobia or eye pain  ENT ROS: negative for epistaxis, sore throat or rhinorrhea  Respiratory ROS: no cough, shortness of breath, or wheezing  Cardiovascular ROS: no chest pain or dyspnea on exertion  Gastrointestinal ROS: no abdominal pain, change in bowel habits, or black/ bloody stools  Genito-Urinary ROS: no dysuria, trouble voiding, or hematuria  Musculoskeletal ROS: negative for gait disturbance or muscular weakness  Neurological ROS: no syncope or seizures; no ataxia  Dermatological ROS: negative for pruritis, rash and jaundice         Objective:      Vital Signs:  Vitals:    03/14/18 1037   BP: 132/74   Pulse: 104   Temp: 97.3 °F (36.3 °C)     Wt Readings from Last 1 Encounters:   03/14/18 1037 62 kg (136 lb 9.2 oz)     Body mass index is 24.98 kg/m².   SpO2 Readings from Last 1 Encounters:   03/14/18 99%       Physical Exam:  /74 (BP Location: Left arm, Patient Position: Sitting, BP Method: Small (Automatic))   Pulse 104   Temp 97.3 °F (36.3 °C) (Oral)   Wt 62 kg (136 lb 9.2 oz)   SpO2 99%   BMI 24.98 kg/m²   General appearance: alert, cooperative, no distress  Constitutional:Oriented to person, place, and time.appears well-developed and well-nourished.   HEENT: Normocephalic, atraumatic, neck symmetrical, no nasal discharge   Eyes: conjunctivae/corneas clear, PERRL, EOM's intact  Lungs: + diminished breath sounds RUL, no wheezing, no crackles   Heart: regular rate and rhythm without rub; no displacement of the PMI   Abdomen: soft, non-tender; bowel sounds normoactive; no organomegaly  Extremities: extremities symmetric; no clubbing, cyanosis, or edema  Integument: Skin color, texture, turgor normal; no rashes; hair distrubution normal  Neurologic: Alert and oriented X 3, normal strength, normal coordination and gait  Psychiatric: no pressured speech; normal affect; no evidence of impaired cognition    Assessment:       1. Pulmonary infarct    2. Other acute pulmonary embolism without acute cor pulmonale    3. Acute on chronic respiratory failure with hypoxia    4. Acute idiopathic pericarditis    5. Acute renal failure, unspecified acute renal failure type    6. Right flank pain    7. COPD (chronic obstructive pulmonary disease) with chronic bronchitis    8. Supplemental oxygen dependent    9. Malignant melanoma of lip    10. Current use of long term anticoagulation        Plan:       Diagnoses and all orders for this visit:    Pulmonary infarct  Other acute pulmonary embolism without acute cor pulmonale  Acute on chronic  respiratory failure with hypoxia  Supplemental oxygen dependent  Current use of long term anticoagulation  - recent hospitalization for pulmonary infarcts in setting of known pulmonary embolus (diagnosed 12/2017), on anticoagulation with Eliquis  - had associated respiratory failure, home oxygen arranged upon discharge  - patient continues on Eliquis   - needs pulmonary follow up but we were unable to arrange appointment on her behalf due to previous non compliance- patient counseled to make appointment ASAP and to alert us if she is unable to do so    Acute idiopathic pericarditis  - patient found to have acute pericarditis during recent hospitalization  - on steroid taper- she reports compliance    Acute renal failure, unspecified acute renal failure type  - will monitor renal function, labs next week   - patient established with Dr Diggs but did not complete previously ordered panel of tests in order to prepare for next appointment- our office discussed with them and they will contact her to arrange additional tests and follow up appointment    Right flank pain  -     URINALYSIS; Future  -     Urine culture; Future    COPD (chronic obstructive pulmonary disease) with chronic bronchitis      Malignant melanoma of lip  - surgically resected with no obvious evidence of recurrence             I will see [facility] again in [facility] 3[facility].  Labs scheduled for 3/22/18 prior to her cardiology visit.   Patient educated on the importance of her pulmonary and nephrology follow up.     Scheduled Follow-up :  Future Appointments  Date Time Provider Department Center   3/22/2018 10:20 AM Jin Morrow MD [facility] CARDIO [facility]   3/27/2018 10:30 AM Clemencia Gambino MD [facility] IM [facility]   4/9/2018 2:20 PM Pushpa Mcmahon MD [facility]       Post Visit Medication List:     Medication List          Accurate as of 3/14/18 11:59 PM. If you have any questions, ask your nurse or doctor.                CHANGE how you take these medications    apixaban 2.5 mg Tab  Take 1 tablet (2.5 mg total) by mouth 2 (two) times daily.  What changed:  Another medication with the same name was removed. Continue taking this medication, and follow the directions you see here.  Changed by:  Clemencia Gambino MD        CONTINUE taking these medications    diazePAM 5 MG tablet  Commonly known as:  VALIUM  Take 1 tablet (5 mg total) by mouth daily as needed for Anxiety.     fluticasone 50 mcg/actuation nasal spray  Commonly known as:  FLONASE  1 spray by Each Nare route once daily.     fluticasone-salmeterol 250-50 mcg/dose 250-50 mcg/dose diskus inhaler  Commonly known as:  ADVAIR  Inhale 1 puff into the lungs 2 (two) times daily.     ipratropium-albuterol  mcg/actuation inhaler  Commonly known as:  COMBIVENT  Inhale 1 puff into the lungs every 4 (four) hours as needed for Wheezing. Rescue     oxyCODONE-acetaminophen  mg per tablet  Commonly known as:  PERCOCET     paroxetine 30 MG tablet  Commonly known as:  PAXIL     predniSONE 10 MG tablet  Commonly known as:  DELTASONE  Take 40 mg po q day for five days days then take 30 mg po q day for seven days then take 20 mg po q day for seven days then take 10 mg po q day for seven days then take 5 mg po q day for seven days and then stop therapy     tiZANidine 4 MG tablet  Commonly known as:  ZANAFLEX  TAKE 1-2 TABLETS BY MOUTH THREE TIMES A DAY AS NEEDED     triamcinolone acetonide 0.1% 0.1 % cream  Commonly known as:  KENALOG  Use hs prn itching        STOP taking these medications    traZODone 50 MG tablet  Commonly known as:  DESYREL  Stopped by:  Clemencia Gambino MD

## 2018-03-15 LAB — BACTERIA UR CULT: NO GROWTH

## 2018-03-16 ENCOUNTER — TELEPHONE (OUTPATIENT)
Dept: PRIMARY CARE CLINIC | Facility: CLINIC | Age: 73
End: 2018-03-16

## 2018-03-16 PROBLEM — I26.99 PULMONARY INFARCT: Status: ACTIVE | Noted: 2018-03-16

## 2018-03-16 PROBLEM — Z79.01 CURRENT USE OF LONG TERM ANTICOAGULATION: Status: ACTIVE | Noted: 2018-03-16

## 2018-03-16 PROBLEM — R07.9 CHEST PAIN: Status: RESOLVED | Noted: 2018-03-05 | Resolved: 2018-03-16

## 2018-03-16 PROBLEM — E87.5 HYPERKALEMIA: Status: RESOLVED | Noted: 2018-03-05 | Resolved: 2018-03-16

## 2018-03-16 NOTE — TELEPHONE ENCOUNTER
----- Message from Clemencia Gambino MD sent at 3/16/2018  9:51 AM CDT -----  Please schedule Ms Quevedo for labs prior to her cardiology appointment next week. She does not need to fast. Please let her know her urinalysis and urine culture were normal- no evidence of a urinary tract infection. Thanks.

## 2018-03-16 NOTE — TELEPHONE ENCOUNTER
Spoke to patient and let her know that the urine culture was normal. Also let her know that we scheduled labs prior to her cardiology appointment.

## 2018-03-21 RX ORDER — HYDROCODONE BITARTRATE AND ACETAMINOPHEN 10; 325 MG/1; MG/1
TABLET ORAL
Qty: 90 TABLET | Refills: 0 | Status: SHIPPED | OUTPATIENT
Start: 2018-03-23 | End: 2018-04-23 | Stop reason: SDUPTHER

## 2018-03-21 NOTE — TELEPHONE ENCOUNTER
----- Message from Camilo Devine sent at 3/21/2018 10:51 AM CDT -----  Contact: Self @ [phone]  Pt is asking for refill on hydrocodone-acetaminophen 10-325mg (NORCO)  mg Tab    [facility] - [address]  [address] [facility] [address]  [place] [address]  Phone: [phone] Fax: [phone]

## 2018-03-22 ENCOUNTER — LAB VISIT (OUTPATIENT)
Dept: LAB | Facility: HOSPITAL | Age: 73
End: 2018-03-22
Attending: INTERNAL MEDICINE
Payer: MEDICARE

## 2018-03-22 ENCOUNTER — OFFICE VISIT (OUTPATIENT)
Dept: CARDIOLOGY | Facility: CLINIC | Age: 73
End: 2018-03-22
Payer: MEDICARE

## 2018-03-22 VITALS
WEIGHT: 131.69 LBS | HEART RATE: 104 BPM | DIASTOLIC BLOOD PRESSURE: 69 MMHG | OXYGEN SATURATION: 100 % | SYSTOLIC BLOOD PRESSURE: 117 MMHG | HEIGHT: 62 IN | BODY MASS INDEX: 24.23 KG/M2

## 2018-03-22 DIAGNOSIS — Z79.01 CURRENT USE OF LONG TERM ANTICOAGULATION: ICD-10-CM

## 2018-03-22 DIAGNOSIS — I26.99 PULMONARY INFARCT: ICD-10-CM

## 2018-03-22 DIAGNOSIS — I26.92 ACUTE SADDLE PULMONARY EMBOLISM WITHOUT ACUTE COR PULMONALE: Primary | ICD-10-CM

## 2018-03-22 DIAGNOSIS — J96.21 ACUTE ON CHRONIC RESPIRATORY FAILURE WITH HYPOXIA: ICD-10-CM

## 2018-03-22 LAB
ANION GAP SERPL CALC-SCNC: 10 MMOL/L
BUN SERPL-MCNC: 41 MG/DL
CALCIUM SERPL-MCNC: 9.9 MG/DL
CHLORIDE SERPL-SCNC: 102 MMOL/L
CO2 SERPL-SCNC: 28 MMOL/L
CREAT SERPL-MCNC: 2 MG/DL
EST. GFR  (AFRICAN AMERICAN): 28 ML/MIN/1.73 M^2
EST. GFR  (NON AFRICAN AMERICAN): 24 ML/MIN/1.73 M^2
GLUCOSE SERPL-MCNC: 100 MG/DL
POTASSIUM SERPL-SCNC: 4.6 MMOL/L
SODIUM SERPL-SCNC: 140 MMOL/L

## 2018-03-22 PROCEDURE — 36415 COLL VENOUS BLD VENIPUNCTURE: CPT

## 2018-03-22 PROCEDURE — 3078F DIAST BP <80 MM HG: CPT | Mod: CPTII,S$GLB,, | Performed by: INTERNAL MEDICINE

## 2018-03-22 PROCEDURE — 99499 UNLISTED E&M SERVICE: CPT | Mod: S$GLB,,, | Performed by: INTERNAL MEDICINE

## 2018-03-22 PROCEDURE — 99999 PR PBB SHADOW E&M-EST. PATIENT-LVL III: CPT | Mod: PBBFAC,,, | Performed by: INTERNAL MEDICINE

## 2018-03-22 PROCEDURE — 99214 OFFICE O/P EST MOD 30 MIN: CPT | Mod: S$GLB,,, | Performed by: INTERNAL MEDICINE

## 2018-03-22 PROCEDURE — 3074F SYST BP LT 130 MM HG: CPT | Mod: CPTII,S$GLB,, | Performed by: INTERNAL MEDICINE

## 2018-03-22 PROCEDURE — 80048 BASIC METABOLIC PNL TOTAL CA: CPT

## 2018-03-22 NOTE — PROGRESS NOTES
Subjective:   Patient ID:  Katie Quevedo is a 72 y.o. female who presents for follow-up of Hospital Follow Up      Problem List Items Addressed This Visit        Hematology    Pulmonary embolus - Primary    Current use of long term anticoagulation    Pulmonary infarct          HPI: 71 y/o female with recent hospitalization for PE and pleuritis present as f/u. She was last seen by me in December for PE and discharge on Eliquis 5 mg po bid. She said she was compliant with medications. She returned to hospital last month with complaints of chest pain that was sharp and constant and changes with intensity. She is now on steroid but Eliquis was decreased to 2.5 mg po bid. She denies chest pain or dyspnea at this time. BP is controlled. HR is elevated.   She does not smoke. Quit 5 years ago. No orthopnea or PND. She is only able to walk 1 block before having dyspnea.     Review of Systems   Constitution: Negative.   HENT: Negative.    Eyes: Negative.    Cardiovascular: Positive for dyspnea on exertion.   Respiratory: Negative.    Endocrine: Negative.    Hematologic/Lymphatic: Negative.    Skin: Negative.    Musculoskeletal: Negative.    Gastrointestinal: Negative.    Neurological: Negative.    Psychiatric/Behavioral: Negative.    Allergic/Immunologic: Negative.        Patient's Medications   New Prescriptions    No medications on file   Previous Medications    APIXABAN 2.5 MG TAB    Take 1 tablet (2.5 mg total) by mouth 2 (two) times daily.    DIAZEPAM (VALIUM) 5 MG TABLET    Take 1 tablet (5 mg total) by mouth daily as needed for Anxiety.    FLUTICASONE (FLONASE) 50 MCG/ACTUATION NASAL SPRAY    1 spray by Each Nare route once daily.    FLUTICASONE-SALMETEROL 250-50 MCG/DOSE (ADVAIR) 250-50 MCG/DOSE DISKUS INHALER    Inhale 1 puff into the lungs 2 (two) times daily.    HYDROCODONE-ACETAMINOPHEN 10-325MG (NORCO)  MG TAB    TAKE 1 TABLET BY MOUTH THREE TIMES A DAY AS NEEDED    IPRATROPIUM-ALBUTEROL (COMBIVENT)   MCG/ACTUATION INHALER    Inhale 1 puff into the lungs every 4 (four) hours as needed for Wheezing. Rescue    OXYCODONE-ACETAMINOPHEN (PERCOCET)  MG PER TABLET    Take 1 tablet by mouth 3 (three) times daily as needed.    PAROXETINE (PAXIL) 30 MG TABLET    Take 30 mg by mouth once daily. 2 tablets daily at night    PREDNISONE (DELTASONE) 10 MG TABLET    Take 40 mg po q day for five days days then take 30 mg po q day for seven days then take 20 mg po q day for seven days then take 10 mg po q day for seven days then take 5 mg po q day for seven days and then stop therapy    TIZANIDINE (ZANAFLEX) 4 MG TABLET    TAKE 1-2 TABLETS BY MOUTH THREE TIMES A DAY AS NEEDED    TRIAMCINOLONE ACETONIDE 0.1% (KENALOG) 0.1 % CREAM    Use hs prn itching   Modified Medications    No medications on file   Discontinued Medications    No medications on file       Objective:   Physical Exam   Constitutional: She is oriented to person, place, and time. She appears well-developed and well-nourished. No distress.   Examination of the digits showed no clubbing or cyanosis   HENT:   Head: Normocephalic and atraumatic.   Eyes: Conjunctivae are normal. Pupils are equal, round, and reactive to light. Right eye exhibits no discharge.   Neck: Normal range of motion. Neck supple. No JVD present. No thyromegaly present.   No carotid bruits   Cardiovascular: Normal rate, regular rhythm, S1 normal, S2 normal, normal heart sounds, intact distal pulses and normal pulses.  PMI is not displaced.  Exam reveals no gallop, no friction rub and no opening snap.    No murmur heard.  Pulmonary/Chest: Effort normal and breath sounds normal. No respiratory distress. She has no wheezes. She has no rales. She exhibits no tenderness.   Abdominal: Soft. Bowel sounds are normal. She exhibits no distension and no mass. There is no tenderness. There is no guarding.   No hepatosplenomegaly   Musculoskeletal: Normal range of motion. She exhibits no edema or tenderness.    Lymphadenopathy:     She has no cervical adenopathy.   Neurological: She is alert and oriented to person, place, and time.   Skin: Skin is warm. No rash noted. She is not diaphoretic. No erythema.   Psychiatric: She has a normal mood and affect.   Nursing note and vitals reviewed.      ECGs reviewed-NSR with early repolarization abnormality  LABS reviewed  Imaging including Echoes reviewed-normal ef    Assessment:     1. Acute saddle pulmonary embolism without acute cor pulmonale    2. Current use of long term anticoagulation    3. Pulmonary infarct        Plan:     No cor pulmonale on echo. Uncertain why VQ scan did not improve after 3 month on Eliquis. Agree with diagnosis of pleuritis form pulmonary infarct an short term steroids.   Continue Eliquis life long, considering VQ scan worsening  Activity as tolerate  F/u in 4 months     The importance anticoagulation was explained to patient in details. Anticoagulation is to prevent CVA and PE and Sudden death. With anticoagulation there is risk of bleeding but benefits clearly out weight any risk. Patient advice to report any bleeding to cardiologist as soon as possible and do not discontinue medication without consent of cardiologist. All patient questions were answered regarding medication and patient verbalized understanding.      Clinic time spent with patient discussing and treating medical condition including reviewing images, ECG, labs and medications > 20 minutes.

## 2018-03-31 NOTE — PROGRESS NOTES
Patient, Katie Quevedo (MRN #[id number]), presented with a recent Estimated Glumerular Filtration Rate (EGFR) between 15 and 29 consistent with the definition of chronic kidney disease stage 4 (ICD10 - N18.4).    eGFR if non    Date Value Ref Range Status   11/02/2017 27 (A) >60 mL/min/1.73 m^2 Final     Comment:     Calculation used to obtain the estimated glomerular filtration  rate (eGFR) is the CKD-EPI equation.          The patient's chronic kidney disease stage 4 was monitored, evaluated, addressed and/or treated. This addendum to the medical record is made on 12/13/2017.   Dr. Graham

## 2018-04-04 ENCOUNTER — TELEPHONE (OUTPATIENT)
Dept: PRIMARY CARE CLINIC | Facility: CLINIC | Age: 73
End: 2018-04-04

## 2018-04-04 DIAGNOSIS — G89.29 CHRONIC NECK PAIN: ICD-10-CM

## 2018-04-04 DIAGNOSIS — M54.2 CHRONIC NECK PAIN: ICD-10-CM

## 2018-04-04 DIAGNOSIS — M75.51 SUBDELTOID BURSITIS, RIGHT: ICD-10-CM

## 2018-04-04 RX ORDER — TIZANIDINE 4 MG/1
TABLET ORAL
Qty: 120 TABLET | Refills: 1 | Status: SHIPPED | OUTPATIENT
Start: 2018-04-04 | End: 2018-04-09 | Stop reason: SDUPTHER

## 2018-04-04 NOTE — TELEPHONE ENCOUNTER
----- Message from Gil Hernandez sent at 4/4/2018  1:11 PM CDT -----  Contact: Pt. [phone]  tiZANidine (ZANAFLEX) 4 MG tablet refill request     [facility] - [address]  [address]  [place] [address]  Phone: [phone] Fax: [phone]

## 2018-04-09 ENCOUNTER — OFFICE VISIT (OUTPATIENT)
Dept: PHYSICAL MEDICINE AND REHAB | Facility: CLINIC | Age: 73
End: 2018-04-09
Payer: MEDICARE

## 2018-04-09 VITALS
HEIGHT: 62 IN | BODY MASS INDEX: 24.34 KG/M2 | WEIGHT: 132.25 LBS | HEART RATE: 117 BPM | DIASTOLIC BLOOD PRESSURE: 75 MMHG | SYSTOLIC BLOOD PRESSURE: 139 MMHG

## 2018-04-09 DIAGNOSIS — G89.29 CHRONIC NECK PAIN: ICD-10-CM

## 2018-04-09 DIAGNOSIS — M54.41 CHRONIC MIDLINE LOW BACK PAIN WITH RIGHT-SIDED SCIATICA: Primary | ICD-10-CM

## 2018-04-09 DIAGNOSIS — M16.0 PRIMARY OSTEOARTHRITIS OF BOTH HIPS: ICD-10-CM

## 2018-04-09 DIAGNOSIS — M96.1 LUMBAR POSTLAMINECTOMY SYNDROME: ICD-10-CM

## 2018-04-09 DIAGNOSIS — M17.0 PRIMARY OSTEOARTHRITIS OF BOTH KNEES: ICD-10-CM

## 2018-04-09 DIAGNOSIS — M96.1 CERVICAL POST-LAMINECTOMY SYNDROME: ICD-10-CM

## 2018-04-09 DIAGNOSIS — M75.51 SUBDELTOID BURSITIS, RIGHT: ICD-10-CM

## 2018-04-09 DIAGNOSIS — G89.29 CHRONIC MIDLINE LOW BACK PAIN WITH RIGHT-SIDED SCIATICA: Primary | ICD-10-CM

## 2018-04-09 DIAGNOSIS — M54.2 CHRONIC NECK PAIN: ICD-10-CM

## 2018-04-09 DIAGNOSIS — M47.26 OSTEOARTHRITIS OF SPINE WITH RADICULOPATHY, LUMBAR REGION: ICD-10-CM

## 2018-04-09 PROCEDURE — 3078F DIAST BP <80 MM HG: CPT | Mod: CPTII,S$GLB,, | Performed by: PHYSICAL MEDICINE & REHABILITATION

## 2018-04-09 PROCEDURE — 99214 OFFICE O/P EST MOD 30 MIN: CPT | Mod: S$GLB,,, | Performed by: PHYSICAL MEDICINE & REHABILITATION

## 2018-04-09 PROCEDURE — 99499 UNLISTED E&M SERVICE: CPT | Mod: S$PBB,,, | Performed by: PHYSICAL MEDICINE & REHABILITATION

## 2018-04-09 PROCEDURE — 99999 PR PBB SHADOW E&M-EST. PATIENT-LVL III: CPT | Mod: PBBFAC,,, | Performed by: PHYSICAL MEDICINE & REHABILITATION

## 2018-04-09 PROCEDURE — 3075F SYST BP GE 130 - 139MM HG: CPT | Mod: CPTII,S$GLB,, | Performed by: PHYSICAL MEDICINE & REHABILITATION

## 2018-04-09 RX ORDER — TIZANIDINE 4 MG/1
4 TABLET ORAL 3 TIMES DAILY PRN
Qty: 90 TABLET | Refills: 2 | Status: SHIPPED | OUTPATIENT
Start: 2018-04-09 | End: 2018-05-30 | Stop reason: SDUPTHER

## 2018-04-23 RX ORDER — HYDROCODONE BITARTRATE AND ACETAMINOPHEN 10; 325 MG/1; MG/1
TABLET ORAL
Qty: 90 TABLET | Refills: 0 | Status: SHIPPED | OUTPATIENT
Start: 2018-04-23 | End: 2018-05-23 | Stop reason: SDUPTHER

## 2018-04-23 NOTE — TELEPHONE ENCOUNTER
03/21/18 last Rx refill  04/09/18 last office visit  07/05/18 RTC          ----- Message from Camilo Devine sent at 4/23/2018 11:58 AM CDT -----  Contact: Self @ [phone]  Pt is asking for refill on hydrocodone-acetaminophen 10-325mg (NORCO)  mg Tab    [facility] - [address]  [address] [facility] [address]  [place] [address]  Phone: [phone] Fax: [phone]

## 2018-04-25 ENCOUNTER — OFFICE VISIT (OUTPATIENT)
Dept: PRIMARY CARE CLINIC | Facility: CLINIC | Age: 73
End: 2018-04-25
Payer: MEDICARE

## 2018-04-25 VITALS
BODY MASS INDEX: 25.22 KG/M2 | SYSTOLIC BLOOD PRESSURE: 135 MMHG | TEMPERATURE: 98 F | HEART RATE: 99 BPM | DIASTOLIC BLOOD PRESSURE: 71 MMHG | WEIGHT: 137.88 LBS | OXYGEN SATURATION: 94 %

## 2018-04-25 DIAGNOSIS — I26.99 PULMONARY INFARCT: ICD-10-CM

## 2018-04-25 DIAGNOSIS — I30.0 ACUTE IDIOPATHIC PERICARDITIS: ICD-10-CM

## 2018-04-25 DIAGNOSIS — I26.92 ACUTE SADDLE PULMONARY EMBOLISM WITHOUT ACUTE COR PULMONALE: Primary | ICD-10-CM

## 2018-04-25 DIAGNOSIS — Z91.199 MEDICAL NON-COMPLIANCE: ICD-10-CM

## 2018-04-25 DIAGNOSIS — Z79.01 CURRENT USE OF LONG TERM ANTICOAGULATION: ICD-10-CM

## 2018-04-25 PROCEDURE — 99499 UNLISTED E&M SERVICE: CPT | Mod: S$PBB,,, | Performed by: INTERNAL MEDICINE

## 2018-04-25 PROCEDURE — 3078F DIAST BP <80 MM HG: CPT | Mod: CPTII,S$GLB,, | Performed by: INTERNAL MEDICINE

## 2018-04-25 PROCEDURE — 99999 PR PBB SHADOW E&M-EST. PATIENT-LVL III: CPT | Mod: PBBFAC,,, | Performed by: INTERNAL MEDICINE

## 2018-04-25 PROCEDURE — 3075F SYST BP GE 130 - 139MM HG: CPT | Mod: CPTII,S$GLB,, | Performed by: INTERNAL MEDICINE

## 2018-04-25 PROCEDURE — 99213 OFFICE O/P EST LOW 20 MIN: CPT | Mod: S$GLB,,, | Performed by: INTERNAL MEDICINE

## 2018-04-25 RX ORDER — FLUTICASONE PROPIONATE AND SALMETEROL 250; 50 UG/1; UG/1
1 POWDER RESPIRATORY (INHALATION) 2 TIMES DAILY
Qty: 60 EACH | Refills: 3 | Status: SHIPPED | OUTPATIENT
Start: 2018-04-25 | End: 2018-08-08

## 2018-04-25 NOTE — PROGRESS NOTES
Subjective:       Patient ID: Katie Quevedo is a 72 y.o. female.    Chief Complaint: Hospital Follow Up    HPI:    Presents to [facility] for hospital follow up.  Admitted 3/5/18 for pulmonary infarcts in setting of known pulmonary embolus ( PE diagnosed 12/2017), on anticoagulation with Eliquis.   Pulmonary embolus in 12/2017 appears to be unprovoked.   Hospitalization further complicated by acute pericarditis, and acute kidney injury with hyperkalemia.   Prednisone taper initiated for treatment of pericarditis.  Anticoagulation continued with Eliquis.  Oxygen arranged for home use.  Patient discharged to home.     Coordination of care and appropriate follow up has been challenging as patient repeatedly misses scheduled tests, sub specialty appointments, and [facility] follow up.  Multiple unsuccessful attempts made to have her see Pulmonary and Nephrology.     She is unaccompanied today.  Reports compliance with all medication, although she is unable to verify any of the medications specifically.  Not using her home oxygen and is noted to be comfortable at 94% on RA at present.   Fortunately she did keep her Cardiology appt, and notes from this encounter have been reviewed.  Current recommendation is for life long anticoagulation.     Review of Systems  General ROS: negative for chills, fever or weight loss  Psychological ROS: negative for hallucination, depression or suicidal ideation  Ophthalmic ROS: negative for blurry vision, photophobia or eye pain  ENT ROS: negative for epistaxis, sore throat or rhinorrhea  Respiratory ROS: no cough, shortness of breath, or wheezing  Cardiovascular ROS: no chest pain or dyspnea on exertion  Gastrointestinal ROS: no abdominal pain, change in bowel habits, or black/ bloody stools  Genito-Urinary ROS: no dysuria, trouble voiding, or hematuria  Musculoskeletal ROS: negative for gait disturbance or muscular weakness  Neurological ROS: no syncope or seizures; no  ataxia  Dermatological ROS: negative for pruritis, rash and jaundice        Objective:      Vital Signs:  Vitals:    04/25/18 1145   BP: 135/71   Pulse: 99   Temp: 97.7 °F (36.5 °C)     Wt Readings from Last 1 Encounters:   04/09/18 1435 60 kg (132 lb 4.4 oz)     Body mass index is 25.22 kg/m².   SpO2 Readings from Last 1 Encounters:   03/22/18 100%       Physical Exam:  /71 (BP Location: Left arm, Patient Position: Sitting, BP Method: Small (Automatic))   Pulse 99   Temp 97.7 °F (36.5 °C) (Oral)   Wt 62.5 kg (137 lb 14.4 oz)   SpO2 (!) 94%   BMI 25.22 kg/m²   General appearance: alert, cooperative, no distress  Constitutional:Oriented to person, place, and time.appears well-developed and well-nourished.   HEENT: Normocephalic, atraumatic, neck symmetrical, no nasal discharge   Eyes: conjunctivae/corneas clear, PERRL, EOM's intact  Lungs: clear to auscultation bilaterally, no dullness to percussion bilaterally  Heart: regular rate and rhythm without rub; no displacement of the PMI   Abdomen: soft, non-tender; bowel sounds normoactive; no organomegaly  Extremities: extremities symmetric; no clubbing, cyanosis, or edema  Integument: Skin color, texture, turgor normal; no rashes; hair distrubution normal  Neurologic: Alert and oriented X 3, normal strength, normal coordination and gait  Psychiatric: no pressured speech; normal affect; no evidence of impaired cognition    Assessment:       1. Acute saddle pulmonary embolism without acute cor pulmonale    2. Current use of long term anticoagulation    3. Pulmonary infarct    4. Acute idiopathic pericarditis    5. Medical non-compliance        Plan:       Diagnoses and all orders for this visit:    Acute saddle pulmonary embolism without acute cor pulmonale  Current use of long term anticoagulation  Pulmonary infarct  - hospitalized in 3/2018 for respiratory failure related to pulmonary infarcts, in setting on known PE diagnosed in 12/2017, on anticoagulation  with Eliquis  - it is unclear why her repeat VQ scan showed no improvement three months into her course of anticoagulation  - was prescribed home oxygen at hospital discharge but is no longer using it, does not appear to require it at this time  - Eliquis is renally dosed presently   - recommendation at this point is for life long anticoagulation     Acute idiopathic pericarditis  - patient found to have acute pericarditis during recent hospitalization  - completed steroid taper  - being followed by Cardiology, Dr Morrow       Medical non-compliance  - great difficulty getting patient to keep specialty appointments, [facility] appointments, and completing scheduled tests    Other orders  -     fluticasone-salmeterol 250-50 mcg/dose (ADVAIR) 250-50 mcg/dose diskus inhaler; Inhale 1 puff into the lungs 2 (two) times daily.        Patient will be released from [facility].   Has PCP follow up, Dr Verduzco, 6/1/18.    Scheduled Follow-up :  Future Appointments  Date Time Provider Department Center   6/1/2018 10:00 AM Kingston Verduzco MD [facility] FAM MED [facility]   7/5/2018 2:20 PM Pushpa Mcmahon MD [facility]   7/26/2018 1:20 PM Jin Morrow MD [facility] CARD[facility]       Post Visit Medication List:     Medication List          Accurate as of 4/25/18 11:59 PM. If you have any questions, ask your nurse or doctor.               CONTINUE taking these medications    apixaban 2.5 mg Tab  Take 1 tablet (2.5 mg total) by mouth 2 (two) times daily.     diazePAM 5 MG tablet  Commonly known as:  VALIUM  Take 1 tablet (5 mg total) by mouth daily as needed for Anxiety.     fluticasone 50 mcg/actuation nasal spray  Commonly known as:  FLONASE  1 spray by Each Nare route once daily.     fluticasone-salmeterol 250-50 mcg/dose 250-50 mcg/dose diskus inhaler  Commonly known as:  ADVAIR  Inhale 1 puff into the lungs 2 (two) times daily.     hydrocodone-acetaminophen 10-325mg  mg Tab  Commonly known  as:  NORCO  TAKE 1 TABLET BY MOUTH THREE TIMES A DAY AS NEEDED     ipratropium-albuterol  mcg/actuation inhaler  Commonly known as:  COMBIVENT  Inhale 1 puff into the lungs every 4 (four) hours as needed for Wheezing. Rescue     oxyCODONE-acetaminophen  mg per tablet  Commonly known as:  PERCOCET     paroxetine 30 MG tablet  Commonly known as:  PAXIL     tiZANidine 4 MG tablet  Commonly known as:  ZANAFLEX  Take 1 tablet (4 mg total) by mouth 3 (three) times daily as needed (muscle spasms). TAKE 1-2 TABLETS BY MOUTH THREE TIMES A DAY AS NEEDED     triamcinolone acetonide 0.1% 0.1 % cream  Commonly known as:  KENALOG  Use hs prn itching        STOP taking these medications    predniSONE 10 MG tablet  Commonly known as:  DELTASONE  Stopped by:  Clemencia Gambino MD           Where to Get Your Medications      These medications were sent to [facility] - [address]  [address] [place] [address]    Phone:  [phone]   · fluticasone-salmeterol 250-50 mcg/dose 250-50 mcg/dose diskus inhaler

## 2018-04-26 PROBLEM — N17.9 ACUTE KIDNEY FAILURE: Status: RESOLVED | Noted: 2018-03-05 | Resolved: 2018-04-26

## 2018-04-26 PROBLEM — Z91.199 MEDICAL NON-COMPLIANCE: Status: ACTIVE | Noted: 2018-04-26

## 2018-04-26 PROBLEM — Z99.81 SUPPLEMENTAL OXYGEN DEPENDENT: Status: RESOLVED | Noted: 2018-02-06 | Resolved: 2018-04-26

## 2018-04-26 PROBLEM — I26.92 ACUTE SADDLE PULMONARY EMBOLISM WITHOUT ACUTE COR PULMONALE: Status: ACTIVE | Noted: 2017-12-13

## 2018-04-26 PROBLEM — J96.21 ACUTE ON CHRONIC RESPIRATORY FAILURE WITH HYPOXIA: Status: RESOLVED | Noted: 2018-03-05 | Resolved: 2018-04-26

## 2018-04-27 ENCOUNTER — OUTPATIENT CASE MANAGEMENT (OUTPATIENT)
Dept: ADMINISTRATIVE | Facility: OTHER | Age: 73
End: 2018-04-27

## 2018-04-27 NOTE — PATIENT INSTRUCTIONS
Chronic Lung Disease: Managing Sleep Problems  Chronic lung diseases include chronic obstructive pulmonary disease (COPD), most often chronic bronchitis and emphysema. They also include pulmonary fibrosis or sarcoidosis. If you have a chronic lung disease, you may have trouble sleeping. You may wake up often at night. Or you may not feel rested in the morning. There are many reasons you may not be getting a good nights sleep. Lung disease can make it harder to breathe at night. Age, certain medicines, and not getting enough activity during the day can also affect sleep.    Sleeping better  If youre having trouble sleeping, try the following:  · Use a breathing technique. Taking slow, deep breaths can help you relax and fall asleep.  ¨ Ask your healthcare provider to show you how to do pursed-lip and diaphragmatic breathing in bed. Both of these breathing methods are good for people with lung disease.  · Dont have drinks with caffeine in the afternoon or evening.   · Try to go to sleep and wake up at around the same time every day. This helps your body get into a pattern.  · Avoid long naps during the day. This can make it harder to sleep at night. A very brief nap should be OK.  · Make sure your bed and bedroom are comfortable for you. This includes temperature, light, and noise level.  · It may be best not to watch television or use your computer or phone in bed.  · Talk to your healthcare provider about  any medicines you take at bedtime, they may be keeping you awake. You may be able to take the medicine at another time of day.  CPAP and BiPAP  To help with breathing at night, your healthcare provider may prescribe CPAP or BiPAP. You may be given a CPAP (continuous positive airway pressure) device. Or you may be given a BiPAP (bilevel positive airway pressure) device. These devices send a gentle flow of air through a nasal mask while you sleep. This air goes through your nose and into your lungs, keeping  your airways open. Below are tips for using these devices:  · Give yourself time to adjust to the device. It may take a while. You can ask your provider or someone from the medical supply company for suggestions to make it more comfortable.  · If your mask doesnt fit or feel right, talk to your provider or the medical supply company representative about adjusting it. Or you may try a different mask. Custom-made masks are also available.  · These devices work best if your nose is clear. If you have allergies or other problems that block your nose, talk to your provider.  Date Last Reviewed: 5/1/2016 © 2000-2017 [employer]. [address] [place] [address], [place] [address]. All rights reserved. This information is not intended as a substitute for professional medical care. Always follow your healthcare professional's instructions.        Chronic Lung Disease: Preventing Lung Infections  Chronic lung diseases include chronic obstructive pulmonary disease (COPD), which includes chronic bronchitis and emphysema. Other chronic lung diseases include pulmonary fibrosis, sarcoidosis, and other conditions. When you have chronic lung diseases, it's very important to protect yourself from respiratory infections, like colds, the flu, and lung infections. Infections may cause your lung condition to worsen. Although you can't completely avoid them, there are things you can do to lessen the chance of infections.    Take precautions  Taking the following precautions can help you avoid illness:  · Remember to keep your hands away from your nose and mouth. Germs on your hands get into your respiratory system this way.  · Wash your hands often. When you wash them:  ¨ Use soap and warm water.  ¨ Rub your hands together well for at least 20 seconds.  ¨ Make sure to rinse them well.  ¨ Dry your hands on clean towels or air-dry them.  · Use hand  containing alcohol, if you are unable to wash your hands. Use the   after touching doorknobs, handles, and supermarket carts, for example, since lots of people touch them. Then wash your hands as soon as you can.  · To help prevent the flu, get a flu vaccination every year. This may be given at your healthcare provider's office, a drugstore, or pharmacy, or at work. Get your flu shot as soon as the vaccines are available in your area. This is usually around September each year.  · To help prevent pneumococcal pneumonia, get pneumonia vaccinations. Talk with your healthcare provider about which pneumococcal vaccinations you need.  · Try to stay away from people with respiratory infections, such as colds or the flu. Stay away from crowded places, like shopping centers or movie theatres during cold and flu season.  · If you smoke, think about quitting. In addition to causing or worsening many lung conditions, the lung damage from smoking increases your risk of infections. Stay away from others who smoke, too. This is also harmful and increases your chance of infections.  Date Last Reviewed: 4/14/2016  © 1866-6236 [employer]. [address] [place] [address]. All rights reserved. This information is not intended as a substitute for professional medical care. Always follow your healthcare professional's instructions.        Discharge Instructions: COPD  You have been diagnosed with chronic obstructive pulmonary disease (COPD). This is a name given to a group of diseases that limit the flow of air in and out of your lungs. This makes it harder to breathe. With COPD, you are also more likely to get lung infections. COPD includes chronic bronchitis and emphysema. COPD is most often caused by heavy, long-term cigarette smoking.  Home care  Quit smoking  · If you smoke, quit. It is the best thing you can do for your COPD and your overall health.  · Join a stop-smoking program. There are even telephone, text message, and Internet programs to help you quit.  · Ask  your healthcare provider about medicines or other methods to help you quit.  · Ask family members to quit smoking as well.  · Don't allow people to smoke in your home, in your car, or when they are around you.  Protect yourself from infection  · Wash your hands often. Do your best to keep your hands away from your face. Most germs are spread from your hands to your mouth.  · Get a flu shot every year. Also ask your provider about pneumonia vaccines.  · Avoid crowds. It's especially important to do this in the winter when more people have colds and flu.  · To stay healthy, get enough sleep, exercise regularly, and eat a balanced diet. You should:  ¨ Get about 8 hours of sleep every night.  ¨ Try to exercise for at least 30 minutes on most days.  ¨ Have healthy foods including fruits and vegetables, 100% whole grains, lean meats and fish, and low-fat dairy products. Try to stay away from foods high in fats and sugar.  Take your medicines  Take your medicines exactly as directed. Don't skip doses.  Manage your stress  Stress can make COPD worse. Use this stress management technique:  · Find a quiet place and sit or lie in a comfortable position.  · Close your eyes and perform breathing exercises for several minutes. Ask your provider about the best way to breathe.  Pulmonary rehabilitation  · Pulmonary rehab can help you feel better. These programs include exercise, breathing techniques, information about COPD, counseling, and help for smokers.  · Ask your provider or your local hospital about programs in your area.  When to call your healthcare provider  Call your provider immediately if you have any of the following:  · Shortness of breath, wheezing, or coughing  · Increased mucus  · Yellow, green, bloody, or smelly mucus  · Fever or chills  · Tightness in your chest that does not go away with rest or medicine  · An irregular heartbeat or a feeling that your heart is beating very fast  · Swollen ankles   Date Last  Reviewed: 5/1/2016  © 0036-0481 [employer]. [address] [place] [address], [place] [address]. All rights reserved. This information is not intended as a substitute for professional medical care. Always follow your healthcare professional's instructions.        COPD Flare    You have had a flare-up of your COPD.  COPD, or chronic obstructive pulmonary disease, is a common lung disease. It causes your airways to become irritated and narrower. This makes it harder for you to breathe. Emphysema and chronic bronchitis are both types of COPD. This is a chronic condition, which means you always have it. Sometimes it gets worse. When this happens, it is called a flare-up.  Symptoms of COPD  People with COPD may have symptoms most of the time. In a flare-up, your symptoms get worse. These symptoms may mean you are having a flare-up:  · Shortness of breath, shallow or rapid breathing, or wheezing that gets worse  · Lung infection  · Cough that gets worse  · More mucus, thicker mucus or mucus of a different color  · Tiredness, decreased energy, or trouble doing your usual activities  · Fever  · Chest tightness  · Your symptoms dont get better even when you use your usual medicines, inhalers, and nebulizer  · Trouble talking  · You feel confused  Causes of flare-ups  Unfortunately, a flare-up can happen even though you did everything right, and you followed your doctors instructions. Some causes of flare-ups are:  · Smoking or secondhand smoke  · Colds, the flu, or respiratory infections  · Air pollution  · Sudden change in the weather  · Dust, irritating chemicals, or strong fumes  · Not taking your medicines as prescribed  Home care  Here are some things you can do at home to treat a flare-up:  · Try not to panic. This makes it harder to breathe, and keeps you from doing the right things.  · Dont smoke or be around others who are smoking.  · Try to drink more fluids than usual during a flare-up, unless your doctor  has told you not to because of heart and kidney problems. More fluids can help loosen the mucus.  · Use your inhalers and nebulizer, if you have one, as you have been told to.  · If you were given antibiotics, take them until they are used up or your doctor tells you to stop. Its important to finish the antibiotics, even though you feel better. This will make sure the infection has cleared.  · If you were given prednisone or another steroid, finish it even if you feel better.  Preventing a flare-up  Even though flare-ups happen, the best way to treat one is to prevent it before it starts. Here are some pointers:  · Dont smoke or be around others who are smoking.  · Take your medicines as you have been told.  · Talk with your doctor about getting a flu shot every year. Also find out if you need a pneumonia shot.  · If there is a weather advisory warning to stay indoors, try to stay inside when possible.  · Try to eat healthy and get plenty of sleep.  · Try to avoid things that usually set you off, like dust, chemical fumes, hairsprays, or strong perfumes.  Follow-up care  Follow up with your healthcare provider, or as advised.  If a culture was done, you will be told if your treatment needs to be changed. You can call as directed for the results.  If X-rays were done, you will be notified of any new findings that may affect your care.  Call 911  Call 911 if any of these occur:  · You have trouble breathing  · You feel confused or its difficult to wake you up  · You faint or lose consciousness  · You have a rapid heart rate  · You have new pain in your chest, arm, shoulder, neck or upper back  When to seek medical advice  Call your healthcare provider right away if any of these occur:  · Wheezing or shortness of breath gets worse  · You need to use your inhalers more often than usual without relief  · Fever of 100.4°F (38ºC) or higher, or as directed by your healthcare provider  · Coughing up lots of dark-colored  or bloody mucus (sputum)  · Chest pain with each breath  · You do not start to get better within 24 hours  · Swelling of your ankles gets worse  · Dizziness or weakness  Date Last Reviewed: 9/1/2016  © 8997-9175 [employer]. [address] [place] [address], [place] [address]. All rights reserved. This information is not intended as a substitute for professional medical care. Always follow your healthcare professional's instructions.        Diagnosing COPD  Your healthcare provider will use your past health history, a physical exam, and certain tests to diagnose COPD.  Health history  Your healthcare provider learns about your health history by asking questions. Topics include:  · History of present illness. Tell your healthcare provider about your symptoms. Also tell him or her how long you have had them.  · Past medical and surgical history. Share other health problems and surgery you have had.  · Family history. Report serious health problems in close family members. This is especially true of any lung problems.  · Social and environmental history. The most important factor in COPD is whether you smoke or have smoked in the past. You should also tell your provider if you have been around secondhand smoke, harmful chemicals, or air pollution.  · Functional assessment. Report whether breathing gets in the way of your daily activities.  Physical exam    Your provider will examine you. He or she will check your heart and lungs with a stethoscope. The focus will be on your airways, including your nose and throat.   Diagnostic tests  · Pulmonary function tests measure the flow of air into and out of your lungs. They also check how much air your lungs can hold. The most common pulmonary function test is spirometry. This measures how fast and how much air you can blow out (exhale). This test is important to help diagnose COPD.  · Pulse oximetry shows how much oxygen is in your blood (oxygen saturation). This may be  done at rest. It may also be done during and after exercise.  · Arterial blood gas tests measure levels of oxygen and carbon dioxide in your blood.  · Chest X-rays show the size and shape of your lungs. They can also show certain problems in the lungs.  · CT scans show detailed images of the lungs.  Date Last Reviewed: 10/1/2016  © 6151-6435 [employer]. [address] [place] [address], [place] [address]. All rights reserved. This information is not intended as a substitute for professional medical care. Always follow your healthcare professional's instructions.        Treatment for COPD    Your healthcare provider will prescribe the best treatments for your COPD.  Treatment  Recommendations include the following:  · Medicines. Some medicines help relieve symptoms when you have them. Others are taken daily to control inflammation in the lungs. Always take your medicines as prescribed. Learn the names of your medicines, as well as how and when to use them.  · Oxygen therapy. Oxygen may be prescribed if tests show that your blood contains too little oxygen.  · Smoking. If you smoke, quit. Smoking is the main cause of COPD. Quitting will help you be able to better manage your COPD. Ask your healthcare provider about ways to help you quit smoking.  · Avoiding infections. Infections, like a cold or the flu, can cause your symptoms to worsen. Try to stay away from people who are sick. Wash your hands often. And, ask your healthcare provider about vaccines for the flu and pneumonia.  Coping with shortness of breath  Coping tips include the following:  · Exercise. Try to be as active as possible. This will improve energy levels and strengthen your muscles, so you can do more.  · Breathing techniques. Ask your healthcare provider or nurse to show you how to do pursed-lip breathing.  · Balance rest and activity. Each day, try to balance rest periods with activity. For example, you might start the day with getting dressed  and eating breakfast, then relax and read the paper. After that, take a brief walk. And then sit with your feet up for a while.  · Pulmonary rehabilitation. Ask your provider, or call your local hospital to find out about pulmonary rehab programs. The programs help with managing your disease, breathing techniques, exercise, support and counseling.  · Healthy eating. Eating a healthy, balanced diet and making an effort to maintain your ideal weight are important to staying as healthy as possible. Make sure you have a lot of fruit and vegetables every day, as well as balanced portions of whole grains, lean meats and fish, and low-fat dairy products.  Date Last Reviewed: 5/1/2016  © 7241-8014 [employer]. [address] [place] [address], [place] [address]. All rights reserved. This information is not intended as a substitute for professional medical care. Always follow your healthcare professional's instructions.        What is COPD?  COPD stands for chronic obstructive pulmonary disease. It means the airways in your lungs are blocked (obstructed). Because of this, it is hard to breathe. You may have trouble with daily activities because of shortness of breath. Over time the shortness of breath usually worsens making it more and more difficult to take care of yourself and take part in activities. Chronic bronchitis and emphysema are two common types of COPD.  What happens in chronic bronchitis?    The cells in the airways make more mucus than normal. The mucus builds up, narrowing the airways. This means less air travels into and out of the lungs. The lining of the airways may also become inflamed (swollen) and causes the airways to narrow even more.        What happens in emphysema?    The small airways are damaged and lose their stretchiness. The airways collapse when you exhale, causing air to get trapped in the air sacs. This means that less oxygen enters the blood vessels and less oxygen is delivered to all  of the cells of your body. This makes it hard to breathe.     Damage to cilia    Cilia are small hairs that line and protect the airways. Smoking damages the cilia. Damaged cilia cant sweep mucus and particles away. Some of the cilia are destroyed. This damage worsens COPD.  How did I get COPD?  Most people get COPD from smoking. Cigarette smoke damages lungs, which can develop into COPD over many years.  How COPD affects you  COPD makes you work harder to breathe. Air may get trapped in the lungs, which prevents your lungs from filling completely with fresh oxygen-filled air when you inhale (breathe in). It's harder to take deep breaths especially when you are active and start breathing faster. Over time, your lungs may become enlarged filling the lung with air that does not transfer oxygen into the blood. These problems cause you to have shortness of breath (also called dyspnea). Wheezing (hoarse, whistling breathing), chronic cough, and fatigue (feeling tired and worn out) are also common.   Date Last Reviewed: 5/1/2016 © 2000-2017 [employer]. [address] [place] [address]. All rights reserved. This information is not intended as a substitute for professional medical care. Always follow your healthcare professional's instructions.        Chronic Lung Disease: If Oxygen Is Prescribed   Supplemental oxygen is prescribed if tests show that the level of oxygen in your blood is too low. If the level stays too low for too long, serious problems can develop in many parts of the body. Supplemental oxygen helps to relieve your symptoms and prevent future problems by getting more oxygen to the blood. Depending on your test results, you may need oxygen all the time. Or it may only be needed during certain activities, such as exercise or sleep. When oxygen is prescribed, youll be referred to a medical equipment company. They will set up the oxygen unit and teach you how to use it.  Nasal  cannula     A nasal cannula should be placed in the nose with the prongs arching toward you.     Oxygen is most often inhaled through a nasal cannula (lightweight tube with two hollow prongs that fit just inside the nose).  Types of supplemental oxygen    Compressed oxygen   Oxygen concentrator   Liquid oxygen   Prescribed oxygen comes in several forms. You may use more than one type, depending on when you need oxygen:  · Compressed oxygen is oxygen gas stored in a tank. Because the oxygen is stored under pressure, these tanks must be handled carefully. Gauges on the tank can be used to adjust the oxygen flow rate. Your healthcare provider will determine what this should be. Small tanks can be carried. Larger tanks are on wheels and can be pulled around the house.  · An oxygen concentrator is a machine about the size of a large suitcase. It plugs into an electrical outlet. (A back-up oxygen supply is recommended in case of a power outage.) The machine takes oxygen from the air and concentrates it. Its then delivered to you through plastic tubing. The tubing is long enough so that you can move around the house. When youre using the concentrator, it must be kept somewhere that has a good supply of fresh air. (Dont keep it in a confined space, like a closet.) You may be set up on a concentrator if you need oxygen all the time or while youre sleeping.  · Liquid oxygen results when oxygen gas is cooled to a very low temperature. Its kept in special containers that maintain this low temperature. When you use liquid oxygen, its warmed and becomes gas before reaching the cannula. Most tanks come with a portable unit that you can carry or pull on a cart. Some of these weigh only a few pounds. Liquid oxygen units are easy to carry around. If you need oxygen all the time or during activity, this kind of unit can help you stay active.  Oxygen is prescribed just for you  Your healthcare provider will prescribe oxygen based  on your needs. Here are a few things you should know:  · A therapist from the medical equipment company will explain when to use oxygen and what type to use. Youll be taught how to use and maintain your oxygen equipment.  · You must use the exact rate of oxygen prescribed for each activity. Dont increase or decrease the amount without asking your healthcare provider first.  · Supplemental oxygen is a medicine. Its not addictive and causes no side effects when used as directed.   Date Last Reviewed: 5/1/2016 © 2000-2017 [employer]. [address] [place] [address], [place] [address]. All rights reserved. This information is not intended as a substitute for professional medical care. Always follow your healthcare professional's instructions.

## 2018-04-27 NOTE — PROGRESS NOTES
Please note the following patient has been assigned to Marilia Rodrigez RN in Outpatient Case Management for screening for COPD Disease Management.    Referral Diagnosis: COPD    Please contact [facility] at Ext. [phone] with any questions.    Thank you,    Sabina Sosa, [facility]  Ext [phone]

## 2018-04-27 NOTE — PROGRESS NOTES
Talked to patient to perform initial assessment for OPCM/Disease Management/COPD.  Patient is a 73 y/o female with dx of COPD, HTN, CKD Stage IV, Acute saddle PE, Osteopenia and Osteoarthritis.  Patient currently lives with  and patient is primary caregiver.  Patient stated that her  drives her to and from her MD appointments.  Medication reconciliation performed and is taking all medications as prescribed. PHQ-9 performed and score of 8 obtained in which a physician should be notified and use their clinical judgement about treatment based on patient's duration of symptoms and functional impairment.  Patient stated that she has not fallen within past year but does use a walker to assist with mobility and ambulation.  Patient stated that she does not have a MPOA, FPOA or Advance Directives in place at this time.   Patient stated that she is on O2 at 2L/NC.  Reminded patient about upcoming appointment with Dr. Verduzco on 6/1/2018 at 1000 and verbalized understanding.  Patient also stated that she does have an appointment with Dr. Donato on 7/11 at 1500 but it is not showing up in the appointment screen.  I will in basket message Dr. Donato in regards to patient stating that she has an appointment with Dr. Donato.  Encouraged patient to call this RN if having any questions/concerns.   I will mail my contact information should any new problems/concerns arise in the future.  I will mail literature about [facility].  I will mail patient educational materials regarding:  Advance Directives, COPD, Preventing Lung Infections.  Will consult NEIL Bolaños, OPCM-SW for PHQ9 score of 8.  Will admit to OPCM.  Will continue to follow.  JACQUELINE Rodrigez OPCM-RN    Interventions:  Mailed educational materials  Med rec completed  PHQ9 completed  In-basket message Dr. Verduzco in regards to PHQ9 score of 8.  Refer patient to OPCM-SW for PHQ9 score of 8.    Plan:  Continue to educate about COPD. Review signs and symptoms of COPD  flare up. Encourage patient to follow medication and treatment regimen. Encourage patient to maintain follow up with doctors.  F/U with Dr. Verduzco in regards to PHQ9 score of 8  F/U with Dr. Donato in regards to patient clarifying about appt on 7/11 at 1500.  Coordinate care with OPCM-ROSEY.  F/U with patient to complete Advance Directives.  JACQUELINE Rodrigez OPCM-DANIEL

## 2018-05-02 ENCOUNTER — OUTPATIENT CASE MANAGEMENT (OUTPATIENT)
Dept: ADMINISTRATIVE | Facility: OTHER | Age: 73
End: 2018-05-02

## 2018-05-02 NOTE — PROGRESS NOTES
Talked to patient to update plan of care for OPCM.  Patient stated that she is taking all medications as prescribed.  Patient stated that she has not received all educational materials via mail at this time.  Patient stated that she is experiencing some SOB on exertion at times.  Patient remains on O2 at 2L/NC.  Patient denies having any cough at this time.  Patient denies having any falls at this time.  Patient stated that within this past year she has receive both flu and pneumonia vaccinations.  Encouraged patient to continue to receive yearly flu vaccinations to prevent a lung infection and verbalized understanding.  Patient also stated that she has not had someone to call her in order to have an appointment with Dr. Donato (pulm).  I will send an in-basket message to Dr. Donato per patient's request for an appointment with Dr. Donato.  Reminded patient of upcoming appointment with Dr. Verduzco on 6/1 at 1000 and verbalized understanding.  Encouraged patient to call this RN if having any questions/concerns.  Will f/u with patient in one week and patient aware.  Will continue to coordinate care with CHARO Lane-ROSEY.  Will continue to follow.  YESSI Clayton-DANIEL    Interventions:  In-basket message Dr Donato to schedule an appointment for patient per patient's request  Educated patient importance of receiving flu vaccination to prevent lung infections.  Encouraged patient to continue to follow patient's recommended regimen.     Plan:  Continue to educate about COPD. Review signs and symptoms of COPD flare up. Encourage patient to follow medication and treatment regimen. Encourage patient to maintain follow up with doctors.  F/U with Dr. Verduzco in regards to PHQ9 score of 8  F/U with Dr. Donato in regards to patient clarifying about appt on 7/11 at 1500.  Coordinate care with OPCM-SW.  F/U with patient to complete Advance Directives.  YESSI Clayton-RN

## 2018-05-04 ENCOUNTER — OUTPATIENT CASE MANAGEMENT (OUTPATIENT)
Dept: ADMINISTRATIVE | Facility: OTHER | Age: 73
End: 2018-05-04

## 2018-05-04 NOTE — PROGRESS NOTES
left messages on both of patient's telephone numbers in an attempt to complete assessment.  Will try again at a later date.

## 2018-05-07 ENCOUNTER — OUTPATIENT CASE MANAGEMENT (OUTPATIENT)
Dept: ADMINISTRATIVE | Facility: OTHER | Age: 73
End: 2018-05-07

## 2018-05-07 NOTE — PROGRESS NOTES
SUMMARY:   spoke to patient via telephone in order to complete assessment.  Pt reported that she lives with her  and has a daughter close by for assistance/support if needed.  Pt reported that her father recently passed away while receiving [facility] services.  She was tearful when talking about her father.  Pt listed her sister and children as grief support.    Pt has a history of depression and is currently being followed by a non-[facility] Psychiatrist, Dr. Barba.  She did not wish to receive additional resources at this time.       INTERVENTIONS:  1. Completed assessment.  2. Encouraged patient to contact [facility] for grief counseling   3. Provided patient with [place] line  4. Encouraged patient to stay in contact with Dr. Barba and notify him of increased depression symptoms as needed  5. Mailed patient information re: OTC catalog, Advanced Directives, [facility] Grief material, and Vetattend brochure.    PLAN:  Follow up scheduled for 5/21  1. Ensure that patient received mailings  2. Answer questions.

## 2018-05-10 ENCOUNTER — OUTPATIENT CASE MANAGEMENT (OUTPATIENT)
Dept: ADMINISTRATIVE | Facility: OTHER | Age: 73
End: 2018-05-10

## 2018-05-10 NOTE — PROGRESS NOTES
Attempted to update plan of care for OPCM; left voice message to return call.  JACQUELINE Rodrigez, OPCM-RN

## 2018-05-17 ENCOUNTER — OUTPATIENT CASE MANAGEMENT (OUTPATIENT)
Dept: ADMINISTRATIVE | Facility: OTHER | Age: 73
End: 2018-05-17

## 2018-05-17 NOTE — LETTER
May 17, 2018    Katie Quevedo  [address] [place] [address] [address] [place] [id number]             [facility] [address] [place] [address] Dear Katie,    I work with [facility] Outpatient Case Management Department. I have been unsuccessful at reaching you to follow-up to see how you have been doing. If you require any future assistance or if any new concerns or problems arise, please do not hesitate to call.     The Outpatient Case Management Department can be reached at [phone] from 8:00AM to 4:30 PM on Monday thru Friday. [facility] also has a program where a nurse is available 24/7 to answer questions or provide medical advice, their number is [phone].    Thanks,      Marilia Rodrigez,  RN  Outpatient Case Management

## 2018-05-17 NOTE — PROGRESS NOTES
2 nd attempt to update plan of care for OPCM; left message requesting a return call.  As this is my second unsuccessful attempt to complete follow-up for OPCM, I will mail a letter with my contact information.  JACQUELINE Rodrigez, OPCM-RN

## 2018-05-18 ENCOUNTER — OUTPATIENT CASE MANAGEMENT (OUTPATIENT)
Dept: ADMINISTRATIVE | Facility: OTHER | Age: 73
End: 2018-05-18

## 2018-05-21 ENCOUNTER — OUTPATIENT CASE MANAGEMENT (OUTPATIENT)
Dept: ADMINISTRATIVE | Facility: OTHER | Age: 73
End: 2018-05-21

## 2018-05-21 NOTE — PROGRESS NOTES
5/21/2018:  SUMMARY:  Pt's  reported that patient was not home because she and her daughter were grocery shopping.  He did not know when they were going to return.  Will try again on 5/29 since I am out of the office on 5/28    SUMMARY:   spoke to patient via telephone in order to complete assessment.  Pt reported that she lives with her  and has a daughter close by for assistance/support if needed.  Pt reported that her father recently passed away while receiving [facility] services.  She was tearful when talking about her father.  Pt listed her sister and children as grief support.    Pt has a history of depression and is currently being followed by a non-[facility] Psychiatrist, Dr. Barba.  She did not wish to receive additional resources at this time.       INTERVENTIONS:  1. Completed assessment.  2. Encouraged patient to contact [facility] for grief counseling   3. Provided patient with [place] line  4. Encouraged patient to stay in contact with Dr. Barba and notify him of increased depression symptoms as needed  5. Mailed patient information re: OTC catalog, Advanced Directives, [facility] Grief material, and Vetattend brochure.    PLAN:  Follow up scheduled for 5/21  1. Ensure that patient received mailings  2. Answer questions.

## 2018-05-23 RX ORDER — HYDROCODONE BITARTRATE AND ACETAMINOPHEN 10; 325 MG/1; MG/1
TABLET ORAL
Qty: 90 TABLET | Refills: 0 | Status: SHIPPED | OUTPATIENT
Start: 2018-05-24 | End: 2018-06-22 | Stop reason: SDUPTHER

## 2018-05-23 NOTE — TELEPHONE ENCOUNTER
04/23/18 last Rx refill  04/09/18 last office visit  07/05/18 RTC        ----- Message from Uzair Kim sent at 5/23/2018 10:17 AM CDT -----  Rx Refill/Request    Who Called:  Patient @ [phone]    RX Name and Strength: hydrocodone-acetaminophen 10-325mg (NORCO)  mg Tab     Is this a 30 day or 90 day RX:  90day  Preferred Pharmacy with phone number:     [facility] [place] - [address]  [address]  [place] [address]  Phone: [phone] Fax: [phone]

## 2018-05-24 ENCOUNTER — OUTPATIENT CASE MANAGEMENT (OUTPATIENT)
Dept: ADMINISTRATIVE | Facility: OTHER | Age: 73
End: 2018-05-24

## 2018-05-24 NOTE — PROGRESS NOTES
5/24/18-RN CM called patient for follow up. Patient reported that she is doing well. Patient stated that she is experiencing some SOB on exertion at times.  Patient remains on O2 at 2L/NC.  Patient stated that she still has a cough with light yellow phlegm. Denies any fevers. Patient expressed concern about appt with Dr. Donato as it is not appearing on her schedule. Patient denies having any falls at this time. RN LUPILLO provided patient with my contact information. Encouraged patient to call this RN if having any questions/concerns.  Will f/u with patient in 2 weeks week and patient aware.  Will continue to coordinate care with NEIL Bolaños [facility]-.  Will continue to follow. JACQUELINE Elliott RN    Interventions:  --contacted Dr. Donato's office via phone to confirm appt, which is scheduled for July 11th at 3pm  --Encouraged patient to continue to follow patient's recommended regimen  --provided patient with my contact information and encouraged her to contact with needs  --called patient back to confirm appt with Dr. Donato on July 11th at 3pm     Plan:  Continue to educate about COPD. Review signs and symptoms of COPD flare up. Encourage patient to follow medication and treatment regimen. Encourage patient to maintain follow up with doctors.  F/U with Dr. Verduzco in regards to PHQ9 score of 8  Coordinate care with [facility]-SW.  F/U with patient to complete Advance Directives.

## 2018-05-29 ENCOUNTER — TELEPHONE (OUTPATIENT)
Dept: FAMILY MEDICINE | Facility: CLINIC | Age: 73
End: 2018-05-29

## 2018-05-29 ENCOUNTER — OUTPATIENT CASE MANAGEMENT (OUTPATIENT)
Dept: ADMINISTRATIVE | Facility: OTHER | Age: 73
End: 2018-05-29

## 2018-05-29 NOTE — PROGRESS NOTES
"5/29/2018:  SUMMARY:  Pt reported that she has received the information that I mailed to her.  She denied any questions and reported that everything is "fine".  She denied any needs at this time.  Pt reported that she is still being follow by Dr. Barba.  She is in agreement with case being placed on monitoring status at this time.    SUMMARY:  1. Offered support  2. Placed case on monitoring status  3. Encouraged pt to contact  if needs arise.     PLAN:  Follow up scheduled for 6/29  1. Ensure that patient received mailings-5/29/2018  2. Answer questions.   3. Close case if no additional needs arise.  "

## 2018-05-30 DIAGNOSIS — G89.29 CHRONIC NECK PAIN: ICD-10-CM

## 2018-05-30 DIAGNOSIS — M75.51 SUBDELTOID BURSITIS, RIGHT: ICD-10-CM

## 2018-05-30 DIAGNOSIS — M54.2 CHRONIC NECK PAIN: ICD-10-CM

## 2018-05-30 RX ORDER — TIZANIDINE 4 MG/1
TABLET ORAL
Qty: 90 TABLET | Refills: 1 | Status: SHIPPED | OUTPATIENT
Start: 2018-05-30 | End: 2018-07-28 | Stop reason: SDUPTHER

## 2018-06-01 ENCOUNTER — TELEPHONE (OUTPATIENT)
Dept: FAMILY MEDICINE | Facility: CLINIC | Age: 73
End: 2018-06-01

## 2018-06-01 NOTE — TELEPHONE ENCOUNTER
----- Message from Oriana Brantley sent at 6/1/2018 12:43 PM CDT -----  Contact: Abril  [phone]  Patient would like to speak with you about getting medication for nausea. Please advise

## 2018-06-12 ENCOUNTER — OUTPATIENT CASE MANAGEMENT (OUTPATIENT)
Dept: ADMINISTRATIVE | Facility: OTHER | Age: 73
End: 2018-06-12

## 2018-06-12 NOTE — PROGRESS NOTES
6/12/18-1st Attempt to complete follow-up for Outpatient Care Management; left message requesting return call.

## 2018-06-15 ENCOUNTER — LAB VISIT (OUTPATIENT)
Dept: LAB | Facility: HOSPITAL | Age: 73
End: 2018-06-15
Attending: INTERNAL MEDICINE
Payer: MEDICARE

## 2018-06-15 DIAGNOSIS — N18.4 CKD (CHRONIC KIDNEY DISEASE) STAGE 4, GFR 15-29 ML/MIN: ICD-10-CM

## 2018-06-15 LAB
ALBUMIN SERPL BCP-MCNC: 3.6 G/DL
ANION GAP SERPL CALC-SCNC: 7 MMOL/L
BASOPHILS # BLD AUTO: 0.03 K/UL
BASOPHILS NFR BLD: 0.4 %
BUN SERPL-MCNC: 40 MG/DL
CALCIUM SERPL-MCNC: 9.6 MG/DL
CHLORIDE SERPL-SCNC: 102 MMOL/L
CO2 SERPL-SCNC: 25 MMOL/L
CREAT SERPL-MCNC: 2.7 MG/DL
DIFFERENTIAL METHOD: ABNORMAL
EOSINOPHIL # BLD AUTO: 0.6 K/UL
EOSINOPHIL NFR BLD: 7.3 %
ERYTHROCYTE [DISTWIDTH] IN BLOOD BY AUTOMATED COUNT: 13 %
EST. GFR  (AFRICAN AMERICAN): 19 ML/MIN/1.73 M^2
EST. GFR  (NON AFRICAN AMERICAN): 17 ML/MIN/1.73 M^2
GLUCOSE SERPL-MCNC: 99 MG/DL
HCT VFR BLD AUTO: 31 %
HGB BLD-MCNC: 9.8 G/DL
LYMPHOCYTES # BLD AUTO: 1.9 K/UL
LYMPHOCYTES NFR BLD: 24.5 %
MAGNESIUM SERPL-MCNC: 2.3 MG/DL
MCH RBC QN AUTO: 29.5 PG
MCHC RBC AUTO-ENTMCNC: 31.6 G/DL
MCV RBC AUTO: 93 FL
MONOCYTES # BLD AUTO: 0.5 K/UL
MONOCYTES NFR BLD: 6 %
NEUTROPHILS # BLD AUTO: 4.9 K/UL
NEUTROPHILS NFR BLD: 61.8 %
PHOSPHATE SERPL-MCNC: 4.5 MG/DL
PLATELET # BLD AUTO: 344 K/UL
PMV BLD AUTO: 9.3 FL
POTASSIUM SERPL-SCNC: 5.1 MMOL/L
RBC # BLD AUTO: 3.32 M/UL
SODIUM SERPL-SCNC: 134 MMOL/L
WBC # BLD AUTO: 7.84 K/UL

## 2018-06-15 PROCEDURE — 80048 BASIC METABOLIC PNL TOTAL CA: CPT

## 2018-06-15 PROCEDURE — 36415 COLL VENOUS BLD VENIPUNCTURE: CPT

## 2018-06-15 PROCEDURE — 82040 ASSAY OF SERUM ALBUMIN: CPT

## 2018-06-15 PROCEDURE — 83735 ASSAY OF MAGNESIUM: CPT

## 2018-06-15 PROCEDURE — 84100 ASSAY OF PHOSPHORUS: CPT

## 2018-06-15 PROCEDURE — 85025 COMPLETE CBC W/AUTO DIFF WBC: CPT

## 2018-06-18 ENCOUNTER — OUTPATIENT CASE MANAGEMENT (OUTPATIENT)
Dept: ADMINISTRATIVE | Facility: OTHER | Age: 73
End: 2018-06-18

## 2018-06-18 NOTE — PROGRESS NOTES
6/18/18-2nd Attempt to complete follow-up for Outpatient Care Management, left message requesting a return call.

## 2018-06-21 ENCOUNTER — LAB VISIT (OUTPATIENT)
Dept: LAB | Facility: HOSPITAL | Age: 73
End: 2018-06-21
Attending: INTERNAL MEDICINE
Payer: MEDICARE

## 2018-06-21 DIAGNOSIS — N18.4 CKD (CHRONIC KIDNEY DISEASE) STAGE 4, GFR 15-29 ML/MIN: ICD-10-CM

## 2018-06-21 LAB
CREAT CL/1.73 SQ M 12H UR+SERPL-ARVRAT: 17 ML/MIN
CREAT SERPL-MCNC: 2.7 MG/DL
CREAT UR-MCNC: 47.4 MG/DL
CREATININE, URINE (MG/SPEC): 663.6 MG/SPEC
PROT 24H UR-MRATE: NORMAL MG/SPEC
PROT UR-MCNC: <7 MG/DL
URINE COLLECTION DURATION: 24 HR
URINE COLLECTION DURATION: 24 HR
URINE VOLUME: 1400 ML
URINE VOLUME: 1400 ML

## 2018-06-21 PROCEDURE — 84156 ASSAY OF PROTEIN URINE: CPT

## 2018-06-21 PROCEDURE — 82575 CREATININE CLEARANCE TEST: CPT

## 2018-06-22 RX ORDER — HYDROCODONE BITARTRATE AND ACETAMINOPHEN 10; 325 MG/1; MG/1
1 TABLET ORAL 3 TIMES DAILY PRN
Qty: 90 TABLET | Refills: 0 | Status: SHIPPED | OUTPATIENT
Start: 2018-06-22 | End: 2018-07-21 | Stop reason: SDUPTHER

## 2018-06-22 NOTE — TELEPHONE ENCOUNTER
05/23/18 last Rx refill  04/09/18 last office visit  07/05/18 RTC        ----- Message from Summer Gill sent at 6/22/2018  3:04 PM CDT -----  Rx Refill/Request     Is this a Refill or New Rx:  refill  Rx Name and Strength:  HYDROcodone-acetaminophen (NORCO)  mg per tablet  Preferred Pharmacy with phone number:    [facility] - [place] - [address]  [address]  [place] [address]  Phone: [phone] Fax: [phone]    Communication Preference:phone# [phone]  Additional Information: asking to please have the refill done on 6/23, since it will be due on Sunday. Please call.

## 2018-06-25 ENCOUNTER — OUTPATIENT CASE MANAGEMENT (OUTPATIENT)
Dept: ADMINISTRATIVE | Facility: OTHER | Age: 73
End: 2018-06-25

## 2018-06-25 NOTE — PROGRESS NOTES
6/25/18-RN CM called patient for follow up. Spoke with patient's  who reported that patient was not home at this time. Spouse reported that she is doing well. Inquired about patient's breathing. Spouse reported that she does get SOB with exertion. Reported that she does use her Oxygen as directed. Spouse denies any coughing. Reported that patient is going to a [facility] class this afternoon. Stated that she is out shopping with their daughter, reported that she has her rollator as well as oxygen with her. Spouse appreciative of outreach. Reviewed upcoming appt with patient's spouse.  Will f/u with patient in 2 weeks week and patient aware.  Will continue to coordinate care with FR[address].  Will continue to follow. JACQUELINE Elliott RN    Interventions:  --assessed respiratory status with patient's spouse  --reviewed upcoming appts     Plan:  Continue to educate about COPD. Review signs and symptoms of COPD flare up. Encourage patient to follow medication and treatment regimen. Encourage patient to maintain follow up with doctors.  Coordinate care with YESSI-ROSEY.

## 2018-06-29 ENCOUNTER — OUTPATIENT CASE MANAGEMENT (OUTPATIENT)
Dept: ADMINISTRATIVE | Facility: OTHER | Age: 73
End: 2018-06-29
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
## 2018-07-05 ENCOUNTER — OFFICE VISIT (OUTPATIENT)
Dept: PHYSICAL MEDICINE AND REHAB | Facility: CLINIC | Age: 73
End: 2018-07-05
Payer: MEDICARE

## 2018-07-05 VITALS
HEART RATE: 87 BPM | HEIGHT: 62 IN | WEIGHT: 142.44 LBS | DIASTOLIC BLOOD PRESSURE: 60 MMHG | SYSTOLIC BLOOD PRESSURE: 115 MMHG | BODY MASS INDEX: 26.21 KG/M2

## 2018-07-05 DIAGNOSIS — M96.1 CERVICAL POST-LAMINECTOMY SYNDROME: ICD-10-CM

## 2018-07-05 DIAGNOSIS — M54.2 CHRONIC NECK PAIN: ICD-10-CM

## 2018-07-05 DIAGNOSIS — G89.29 CHRONIC NECK PAIN: ICD-10-CM

## 2018-07-05 DIAGNOSIS — M16.0 PRIMARY OSTEOARTHRITIS OF BOTH HIPS: ICD-10-CM

## 2018-07-05 DIAGNOSIS — M47.26 OSTEOARTHRITIS OF SPINE WITH RADICULOPATHY, LUMBAR REGION: ICD-10-CM

## 2018-07-05 DIAGNOSIS — M54.41 CHRONIC MIDLINE LOW BACK PAIN WITH RIGHT-SIDED SCIATICA: Primary | ICD-10-CM

## 2018-07-05 DIAGNOSIS — M17.0 PRIMARY OSTEOARTHRITIS OF BOTH KNEES: ICD-10-CM

## 2018-07-05 DIAGNOSIS — M75.51 SUBDELTOID BURSITIS, RIGHT: ICD-10-CM

## 2018-07-05 DIAGNOSIS — G89.29 CHRONIC MIDLINE LOW BACK PAIN WITH RIGHT-SIDED SCIATICA: Primary | ICD-10-CM

## 2018-07-05 DIAGNOSIS — M96.1 LUMBAR POSTLAMINECTOMY SYNDROME: ICD-10-CM

## 2018-07-05 PROCEDURE — 3074F SYST BP LT 130 MM HG: CPT | Mod: CPTII,S$GLB,, | Performed by: PHYSICAL MEDICINE & REHABILITATION

## 2018-07-05 PROCEDURE — 99999 PR PBB SHADOW E&M-EST. PATIENT-LVL III: CPT | Mod: PBBFAC,,, | Performed by: PHYSICAL MEDICINE & REHABILITATION

## 2018-07-05 PROCEDURE — 3078F DIAST BP <80 MM HG: CPT | Mod: CPTII,S$GLB,, | Performed by: PHYSICAL MEDICINE & REHABILITATION

## 2018-07-05 PROCEDURE — 99214 OFFICE O/P EST MOD 30 MIN: CPT | Mod: S$GLB,,, | Performed by: PHYSICAL MEDICINE & REHABILITATION

## 2018-07-05 RX ORDER — DULOXETIN HYDROCHLORIDE 30 MG/1
30 CAPSULE, DELAYED RELEASE ORAL DAILY
Qty: 30 CAPSULE | Refills: 1 | Status: SHIPPED | OUTPATIENT
Start: 2018-07-05 | End: 2018-07-21 | Stop reason: SDUPTHER

## 2018-07-05 RX ORDER — DICLOFENAC SODIUM 10 MG/G
2 GEL TOPICAL 3 TIMES DAILY
Qty: 3 TUBE | Refills: 2 | Status: ON HOLD | OUTPATIENT
Start: 2018-07-05 | End: 2018-12-28 | Stop reason: HOSPADM

## 2018-07-09 ENCOUNTER — OUTPATIENT CASE MANAGEMENT (OUTPATIENT)
Dept: ADMINISTRATIVE | Facility: OTHER | Age: 73
End: 2018-07-09

## 2018-07-09 NOTE — PROGRESS NOTES
7/9/18-RN CM called patient for follow up. Spoke with patient's  who reported that patient was not home at this time. Spouse reported that she is doing ok, not great.  Reported that she does use her oxygen as needed to assist with SOB. Stated that he would notify patient that DANIEL WESLEY called.  Will f/u with patient in 2 weeks week and patient aware.  Will continue to coordinate care with YESSI Lane-ROSEY.  Will continue to follow. JACQUELINE Elliott RN    Interventions:  --assessed respiratory status with patient's spouse       Plan:  Continue to educate about COPD. Review signs and symptoms of COPD flare up. Encourage patient to follow medication and treatment regimen. Encourage patient to maintain follow up with doctors.  Coordinate care with YESSI-ROSEY.

## 2018-07-13 ENCOUNTER — OUTPATIENT CASE MANAGEMENT (OUTPATIENT)
Dept: ADMINISTRATIVE | Facility: OTHER | Age: 73
End: 2018-07-13
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
## 2018-07-20 ENCOUNTER — OUTPATIENT CASE MANAGEMENT (OUTPATIENT)
Dept: ADMINISTRATIVE | Facility: OTHER | Age: 73
End: 2018-07-20

## 2018-07-20 NOTE — PROGRESS NOTES
7/20/2018:  SUMMARY:   spoke to patient via telephone who reported that she has received the information that I mailed to her.  She denied any questions nor additional needs at this time.  She is in agreement with case closure at this time.     INTERVENTIONS:  1. Encouraged patient to contact  if needs arise.     PLAN:  Close case.

## 2018-07-22 RX ORDER — HYDROCODONE BITARTRATE AND ACETAMINOPHEN 10; 325 MG/1; MG/1
TABLET ORAL
Qty: 90 TABLET | Refills: 0 | Status: SHIPPED | OUTPATIENT
Start: 2018-07-23 | End: 2018-08-23 | Stop reason: SDUPTHER

## 2018-07-22 RX ORDER — DULOXETIN HYDROCHLORIDE 30 MG/1
CAPSULE, DELAYED RELEASE ORAL
Qty: 30 CAPSULE | Refills: 1 | Status: SHIPPED | OUTPATIENT
Start: 2018-07-22 | End: 2018-11-08 | Stop reason: SDUPTHER

## 2018-07-23 ENCOUNTER — OUTPATIENT CASE MANAGEMENT (OUTPATIENT)
Dept: ADMINISTRATIVE | Facility: OTHER | Age: 73
End: 2018-07-23

## 2018-07-24 ENCOUNTER — LAB VISIT (OUTPATIENT)
Dept: LAB | Facility: HOSPITAL | Age: 73
End: 2018-07-24
Attending: INTERNAL MEDICINE
Payer: MEDICARE

## 2018-07-24 DIAGNOSIS — M89.8X9 METABOLIC BONE DISEASE: ICD-10-CM

## 2018-07-24 DIAGNOSIS — N18.4 CKD (CHRONIC KIDNEY DISEASE) STAGE 4, GFR 15-29 ML/MIN: ICD-10-CM

## 2018-07-24 LAB
25(OH)D3+25(OH)D2 SERPL-MCNC: 32 NG/ML
ALBUMIN SERPL BCP-MCNC: 3.9 G/DL
ANION GAP SERPL CALC-SCNC: 11 MMOL/L
BASOPHILS # BLD AUTO: 0.06 K/UL
BASOPHILS NFR BLD: 0.6 %
BUN SERPL-MCNC: 33 MG/DL
CALCIUM SERPL-MCNC: 9.9 MG/DL
CHLORIDE SERPL-SCNC: 104 MMOL/L
CO2 SERPL-SCNC: 24 MMOL/L
CREAT SERPL-MCNC: 3.1 MG/DL
DIFFERENTIAL METHOD: ABNORMAL
EOSINOPHIL # BLD AUTO: 0.9 K/UL
EOSINOPHIL NFR BLD: 9 %
ERYTHROCYTE [DISTWIDTH] IN BLOOD BY AUTOMATED COUNT: 13.5 %
EST. GFR  (AFRICAN AMERICAN): 16 ML/MIN/1.73 M^2
EST. GFR  (NON AFRICAN AMERICAN): 14 ML/MIN/1.73 M^2
GLUCOSE SERPL-MCNC: 111 MG/DL
HCT VFR BLD AUTO: 35.3 %
HGB BLD-MCNC: 11.1 G/DL
LYMPHOCYTES # BLD AUTO: 3.3 K/UL
LYMPHOCYTES NFR BLD: 35.1 %
MAGNESIUM SERPL-MCNC: 2.1 MG/DL
MCH RBC QN AUTO: 29 PG
MCHC RBC AUTO-ENTMCNC: 31.4 G/DL
MCV RBC AUTO: 92 FL
MONOCYTES # BLD AUTO: 0.9 K/UL
MONOCYTES NFR BLD: 9 %
NEUTROPHILS # BLD AUTO: 4.3 K/UL
NEUTROPHILS NFR BLD: 46.2 %
PHOSPHATE SERPL-MCNC: 4.5 MG/DL
PLATELET # BLD AUTO: 277 K/UL
PMV BLD AUTO: 9.7 FL
POTASSIUM SERPL-SCNC: 4.4 MMOL/L
PTH-INTACT SERPL-MCNC: 49.5 PG/ML
RBC # BLD AUTO: 3.83 M/UL
SODIUM SERPL-SCNC: 139 MMOL/L
WBC # BLD AUTO: 9.42 K/UL

## 2018-07-24 PROCEDURE — 85025 COMPLETE CBC W/AUTO DIFF WBC: CPT

## 2018-07-24 PROCEDURE — 84100 ASSAY OF PHOSPHORUS: CPT

## 2018-07-24 PROCEDURE — 82306 VITAMIN D 25 HYDROXY: CPT

## 2018-07-24 PROCEDURE — 82040 ASSAY OF SERUM ALBUMIN: CPT

## 2018-07-24 PROCEDURE — 83970 ASSAY OF PARATHORMONE: CPT

## 2018-07-24 PROCEDURE — 36415 COLL VENOUS BLD VENIPUNCTURE: CPT

## 2018-07-24 PROCEDURE — 83735 ASSAY OF MAGNESIUM: CPT

## 2018-07-24 PROCEDURE — 80048 BASIC METABOLIC PNL TOTAL CA: CPT

## 2018-07-24 RX ORDER — HYDROCODONE BITARTRATE AND ACETAMINOPHEN 10; 325 MG/1; MG/1
1 TABLET ORAL 3 TIMES DAILY PRN
Qty: 90 TABLET | Refills: 0 | OUTPATIENT
Start: 2018-07-24

## 2018-07-26 ENCOUNTER — PES CALL (OUTPATIENT)
Dept: ADMINISTRATIVE | Facility: CLINIC | Age: 73
End: 2018-07-26

## 2018-07-28 DIAGNOSIS — M75.51 SUBDELTOID BURSITIS, RIGHT: ICD-10-CM

## 2018-07-28 DIAGNOSIS — M54.2 CHRONIC NECK PAIN: ICD-10-CM

## 2018-07-28 DIAGNOSIS — G89.29 CHRONIC NECK PAIN: ICD-10-CM

## 2018-07-29 RX ORDER — TIZANIDINE 4 MG/1
TABLET ORAL
Qty: 90 TABLET | Refills: 1 | Status: SHIPPED | OUTPATIENT
Start: 2018-07-29 | End: 2018-09-15 | Stop reason: SDUPTHER

## 2018-07-31 ENCOUNTER — OFFICE VISIT (OUTPATIENT)
Dept: FAMILY MEDICINE | Facility: CLINIC | Age: 73
End: 2018-07-31
Payer: MEDICARE

## 2018-07-31 ENCOUNTER — HOSPITAL ENCOUNTER (EMERGENCY)
Facility: HOSPITAL | Age: 73
Discharge: HOME | End: 2018-08-01
Attending: EMERGENCY MEDICINE
Payer: MEDICARE

## 2018-07-31 ENCOUNTER — OFFICE VISIT (OUTPATIENT)
Dept: CARDIOLOGY | Facility: CLINIC | Age: 73
End: 2018-07-31
Payer: MEDICARE

## 2018-07-31 VITALS
SYSTOLIC BLOOD PRESSURE: 100 MMHG | WEIGHT: 134.5 LBS | HEART RATE: 84 BPM | BODY MASS INDEX: 24.75 KG/M2 | OXYGEN SATURATION: 90 % | HEIGHT: 62 IN | DIASTOLIC BLOOD PRESSURE: 60 MMHG

## 2018-07-31 VITALS
WEIGHT: 134.69 LBS | SYSTOLIC BLOOD PRESSURE: 137 MMHG | BODY MASS INDEX: 24.64 KG/M2 | HEART RATE: 111 BPM | DIASTOLIC BLOOD PRESSURE: 73 MMHG

## 2018-07-31 DIAGNOSIS — Z86.711 HISTORY OF PULMONARY EMBOLISM: ICD-10-CM

## 2018-07-31 DIAGNOSIS — J44.89 COPD (CHRONIC OBSTRUCTIVE PULMONARY DISEASE) WITH CHRONIC BRONCHITIS: Primary | Chronic | ICD-10-CM

## 2018-07-31 DIAGNOSIS — G89.29 CHRONIC NECK PAIN: ICD-10-CM

## 2018-07-31 DIAGNOSIS — J44.89 COPD (CHRONIC OBSTRUCTIVE PULMONARY DISEASE) WITH CHRONIC BRONCHITIS: Primary | ICD-10-CM

## 2018-07-31 DIAGNOSIS — I26.99 PULMONARY INFARCT: ICD-10-CM

## 2018-07-31 DIAGNOSIS — I30.0 ACUTE IDIOPATHIC PERICARDITIS: ICD-10-CM

## 2018-07-31 DIAGNOSIS — M54.2 CHRONIC NECK PAIN: ICD-10-CM

## 2018-07-31 DIAGNOSIS — I95.9 HYPOTENSION, UNSPECIFIED HYPOTENSION TYPE: ICD-10-CM

## 2018-07-31 DIAGNOSIS — I10 ESSENTIAL HYPERTENSION: ICD-10-CM

## 2018-07-31 DIAGNOSIS — R55 NEAR SYNCOPE: ICD-10-CM

## 2018-07-31 DIAGNOSIS — M81.0 OSTEOPOROSIS, UNSPECIFIED OSTEOPOROSIS TYPE, UNSPECIFIED PATHOLOGICAL FRACTURE PRESENCE: ICD-10-CM

## 2018-07-31 DIAGNOSIS — R53.1 WEAKNESS: ICD-10-CM

## 2018-07-31 DIAGNOSIS — N18.4 CKD (CHRONIC KIDNEY DISEASE), STAGE IV: ICD-10-CM

## 2018-07-31 DIAGNOSIS — E86.0 DEHYDRATION: Primary | ICD-10-CM

## 2018-07-31 DIAGNOSIS — M96.1 LUMBAR POSTLAMINECTOMY SYNDROME: ICD-10-CM

## 2018-07-31 DIAGNOSIS — R00.0 TACHYCARDIA: ICD-10-CM

## 2018-07-31 DIAGNOSIS — N18.5 CKD (CHRONIC KIDNEY DISEASE), STAGE V: ICD-10-CM

## 2018-07-31 DIAGNOSIS — Z79.01 CURRENT USE OF LONG TERM ANTICOAGULATION: ICD-10-CM

## 2018-07-31 LAB
ALBUMIN SERPL BCP-MCNC: 3.6 G/DL
ALP SERPL-CCNC: 89 U/L
ALT SERPL W/O P-5'-P-CCNC: 10 U/L
ANION GAP SERPL CALC-SCNC: 10 MMOL/L
AST SERPL-CCNC: 29 U/L
BASOPHILS # BLD AUTO: 0.04 K/UL
BASOPHILS NFR BLD: 0.5 %
BILIRUB SERPL-MCNC: 0.3 MG/DL
BUN SERPL-MCNC: 33 MG/DL
CALCIUM SERPL-MCNC: 8.6 MG/DL
CHLORIDE SERPL-SCNC: 99 MMOL/L
CO2 SERPL-SCNC: 20 MMOL/L
CREAT SERPL-MCNC: 3.3 MG/DL
DIFFERENTIAL METHOD: ABNORMAL
EOSINOPHIL # BLD AUTO: 0.7 K/UL
EOSINOPHIL NFR BLD: 8.1 %
ERYTHROCYTE [DISTWIDTH] IN BLOOD BY AUTOMATED COUNT: 13.7 %
EST. GFR  (AFRICAN AMERICAN): 15 ML/MIN/1.73 M^2
EST. GFR  (NON AFRICAN AMERICAN): 13 ML/MIN/1.73 M^2
GLUCOSE SERPL-MCNC: 82 MG/DL
HCT VFR BLD AUTO: 30.9 %
HGB BLD-MCNC: 9.9 G/DL
LYMPHOCYTES # BLD AUTO: 2.7 K/UL
LYMPHOCYTES NFR BLD: 31.5 %
MCH RBC QN AUTO: 29.8 PG
MCHC RBC AUTO-ENTMCNC: 32 G/DL
MCV RBC AUTO: 93 FL
MONOCYTES # BLD AUTO: 0.8 K/UL
MONOCYTES NFR BLD: 8.6 %
NEUTROPHILS # BLD AUTO: 4.5 K/UL
NEUTROPHILS NFR BLD: 51.3 %
PLATELET # BLD AUTO: 241 K/UL
PMV BLD AUTO: 9.9 FL
POTASSIUM SERPL-SCNC: 5 MMOL/L
PROT SERPL-MCNC: 6.8 G/DL
RBC # BLD AUTO: 3.32 M/UL
SODIUM SERPL-SCNC: 129 MMOL/L
TROPONIN I SERPL DL<=0.01 NG/ML-MCNC: 0.01 NG/ML
WBC # BLD AUTO: 8.69 K/UL

## 2018-07-31 PROCEDURE — 85025 COMPLETE CBC W/AUTO DIFF WBC: CPT

## 2018-07-31 PROCEDURE — 84484 ASSAY OF TROPONIN QUANT: CPT

## 2018-07-31 PROCEDURE — 3074F SYST BP LT 130 MM HG: CPT | Mod: CPTII,S$GLB,, | Performed by: FAMILY MEDICINE

## 2018-07-31 PROCEDURE — 93005 ELECTROCARDIOGRAM TRACING: CPT

## 2018-07-31 PROCEDURE — 80053 COMPREHEN METABOLIC PANEL: CPT

## 2018-07-31 PROCEDURE — 3078F DIAST BP <80 MM HG: CPT | Mod: CPTII,S$GLB,, | Performed by: FAMILY MEDICINE

## 2018-07-31 PROCEDURE — 99999 PR PBB SHADOW E&M-EST. PATIENT-LVL IV: CPT | Mod: PBBFAC,,, | Performed by: FAMILY MEDICINE

## 2018-07-31 PROCEDURE — 25000003 PHARM REV CODE 250: Performed by: EMERGENCY MEDICINE

## 2018-07-31 PROCEDURE — 99999 PR PBB SHADOW E&M-EST. PATIENT-LVL II: CPT | Mod: PBBFAC,,, | Performed by: INTERNAL MEDICINE

## 2018-07-31 PROCEDURE — 3075F SYST BP GE 130 - 139MM HG: CPT | Mod: CPTII,S$GLB,, | Performed by: INTERNAL MEDICINE

## 2018-07-31 PROCEDURE — 99214 OFFICE O/P EST MOD 30 MIN: CPT | Mod: S$GLB,,, | Performed by: INTERNAL MEDICINE

## 2018-07-31 PROCEDURE — 3078F DIAST BP <80 MM HG: CPT | Mod: CPTII,S$GLB,, | Performed by: INTERNAL MEDICINE

## 2018-07-31 PROCEDURE — 96360 HYDRATION IV INFUSION INIT: CPT

## 2018-07-31 PROCEDURE — 99215 OFFICE O/P EST HI 40 MIN: CPT | Mod: S$GLB,,, | Performed by: FAMILY MEDICINE

## 2018-07-31 PROCEDURE — 99284 EMERGENCY DEPT VISIT MOD MDM: CPT | Mod: 25

## 2018-07-31 PROCEDURE — 99499 UNLISTED E&M SERVICE: CPT | Mod: HCNC,S$GLB,, | Performed by: FAMILY MEDICINE

## 2018-07-31 PROCEDURE — 93010 ELECTROCARDIOGRAM REPORT: CPT | Mod: ,,, | Performed by: INTERNAL MEDICINE

## 2018-07-31 PROCEDURE — 99499 UNLISTED E&M SERVICE: CPT | Mod: HCNC,S$GLB,, | Performed by: INTERNAL MEDICINE

## 2018-07-31 RX ADMIN — SODIUM CHLORIDE 1000 ML: 0.9 INJECTION, SOLUTION INTRAVENOUS at 11:07

## 2018-07-31 RX ADMIN — SODIUM CHLORIDE 1000 ML: 0.9 INJECTION, SOLUTION INTRAVENOUS at 09:07

## 2018-07-31 NOTE — PROGRESS NOTES
(Portions of this note were dictated using voice recognition software and may contain dictation related errors in spelling/grammar/syntax not found on text review)    CC:   Chief Complaint   Patient presents with    Follow-up       HPI: 73 y.o. female last seen by me over 2 years ago    [facility] since [facility] visit. Was hospitalized in December for pulmonary embolism, started on Eliquis b.i.d. readmitted in March for chest pain.  Concern for pericarditis.  Also had persistent V/Q abnormalities concerning for pulmonary infarcts.  Was started on prednisone taper.  Her Eliquis was decreased to renal dosing of 2.5 b.i.d. also progressive chronic kidney disease, currently stage 5    Has followed [facility] through [facility].  Notations of significant difficulty with specialty visits.  Has recently followed up with Cardiology and also Nephrology.  Recommended continue Eliquis and definitely secondary to persistent V/Q abnormalities.  Had recommended strongly pulmonary consult which has been recommended in the past but patient had not followed up.  She does have coexisting COPD, on Advair    Furthermore follows up with physical medicine for chronic low back pain, chronic narcotic dependence with hydrocodone.  Also on Cymbalta    Chronic kidney disease stage 5, recent nephrology visit notes noted. Discussing  Dialysis    occ gets some BERTRAND and palp with exertion, usually sits to rest and puts on her O2 (using PRN--2L). Cardiology ordered holter.    OP: was advised on prolia but has not been on anything recently.     Does feel weak and dizzy this morning.  Blood pressure was difficult to obtain today.  Not taking any blood pressure medications.  Does take her routine hydrocodone but no more than usual.  Diazepam occasionally for anxiety, not all the time.  Did not take any this morning.  Takes tizanidine on a regular basis, no more than usual.    Past Medical History:   Diagnosis Date    Anemia     Bilateral renal cysts      Cataract     Chronic LBP 7/26/2012    Chronic pain     CKD (chronic kidney disease)     CKD (chronic kidney disease) stage 4, GFR 15-29 ml/min     COPD (chronic obstructive pulmonary disease)     Dehydration     Encounter for blood transfusion     HTN (hypertension)     Lumbar spondylosis     Melanoma     of the lip    Metabolic bone disease     Migraines, neuralgic     Osteoporosis     Primary osteoarthritis of both knees     s/p Rt TKA    Seizures     Subdeltoid bursitis, L>R. 9/27/2012    Ulcer     Vitamin D deficiency disease        Past Surgical History:   Procedure Laterality Date    BLADDER SUSPENSION      CATARACT EXTRACTION  11/18/13    left eye    CERVICAL LAMINECTOMY      x3, fusion x1    COLONOSCOPY  2009    HYSTERECTOMY      JOINT REPLACEMENT  2001    total right knee     LUMBAR LAMINECTOMY      x 3, fusion x1    OOPHORECTOMY         Family History   Problem Relation Age of Onset    Arthritis Mother     Stroke Mother     Hypertension Father     Cancer Father     Cataracts Father     Diabetes Maternal Aunt     Hypertension Maternal Grandfather     Heart disease Maternal Grandfather     Heart attack Maternal Grandfather     Cataracts Sister     Glaucoma Cousin        Social History     Social History    Marital status:      Spouse name: N/A    Number of children: N/A    Years of education: N/A     Occupational History    Not on file.     Social History Main Topics    Smoking status: Former Smoker     Types: Cigarettes    Smokeless tobacco: Never Used    Alcohol use Yes      Comment: Rare    Drug use: No    Sexual activity: No     Other Topics Concern    Not on file     Social History Narrative    No narrative on file     Lab Results   Component Value Date    WBC 9.42 07/24/2018    HGB 11.1 (L) 07/24/2018    HCT 35.3 (L) 07/24/2018     07/24/2018    CHOL 183 03/05/2018    TRIG 139 03/05/2018    HDL 52 03/05/2018    ALT 12 03/05/2018    AST 21  03/05/2018     07/24/2018    K 4.4 07/24/2018     07/24/2018    CREATININE 3.1 (H) 07/24/2018    CALCIUM 9.9 07/24/2018    BUN 33 (H) 07/24/2018    CO2 24 07/24/2018    TSH 2.694 01/22/2018    INR 0.9 03/05/2018    HGBA1C 5.2 01/22/2018    LDLCALC 103.2 03/05/2018     (H) 07/24/2018           ROS:  GENERAL:  Feel somewhat weak   SKIN:  Pallor  HEAD: No headaches.  EYES: No visual changes  EARS: No ear pain or changes in hearing.  NOSE: No congestion or rhinorrhea.  MOUTH & THROAT: No hoarseness, change in voice, or sore throat.  NODES: Denies swollen glands.  CHEST:  Still some dyspnea on exertion.  CARDIOVASCULAR:  Palpitation with exertion.  ABDOMEN: No nausea, vomiting, or changes in bowel function.  URINARY: No flank pain, dysuria or hematuria.  PERIPHERAL VASCULAR: No claudication or cyanosis.  MUSCULOSKELETAL:  Chronic back pain  NEUROLOGIC: No weakness or numbness.    Vital signs reviewed  PE:   APPEARANCE: Well nourished, well developed, in no acute distress.    HEAD: Normocephalic, atraumatic.  SKIN:  Pallor noted but normal distal capillary refill  EYES: PERRL. EOMI.   Conjunctivae noninjected.  EARS: TM's intact. Light reflex normal. No retraction or perforation  NOSE: Mucosa pink. Airway clear.  MOUTH & THROAT: No tonsillar enlargement. No pharyngeal erythema or exudate.   NECK: Supple with no cervical lymphadenopathy.    CHEST: Good inspiratory effort. Lungs clear to auscultation with no wheezes or crackles.  CARDIOVASCULAR: Normal S1, S2.  Soft heart sounds No rubs, murmurs, or gallops.  ABDOMEN: Bowel sounds normal. Not distended. Soft. No tenderness or masses. No organomegaly.  EXTREMITIES: No edema. 2+ radial pulses.  2+ DP and PT pulses bilaterally    IMPRESSION  1. COPD (chronic obstructive pulmonary disease) with chronic bronchitis    2. CKD (chronic kidney disease), stage V    3. Osteoporosis, unspecified osteoporosis type, unspecified pathological fracture presence    4.  Pulmonary infarct    5. History of pulmonary embolism    6. Acute idiopathic pericarditis    7. Chronic neck pain    8. Lumbar postlaminectomy syndrome    9. Hypotension, unspecified hypotension type            PLAN  Reviewed her hospitalization summaries, workups as summarized above, last labs as noted above    Patient drink a 20 oz water and blood pressure was recheck by me at 100/60.  Feels a little bit better.  Her  mentioned that she was very pill this morning but feels like her skin color is returning a little bit.  I have advised her to drink some Gatorade for the next day or 2 and to check blood pressure at home regularly.  Had discussed that if her blood pressure continues to get significantly low and she is feeling very weak or dizzy she may need some IV fluids and further workup potentially    CKD stage 5:  Reviewed Nephrology documentation.  Discussing potential dialysis.  Labs coming up next month    COPD:  Stable with Advair 250/50 twice daily although given some residual dyspnea symptoms and her history of PE with pulmonary infarcts, I have agreed with her other doctors that she should likely see pulmonology for evaluation, updated PFTs, potentially pulmonary rehab or altering her COPD management?  For PE, she will continue Eliquis which was dosed in-hospital at 2.5 b.i.d given her renal function.    Osteoporosis:  Update DEXA, consider resumption of Prolia    Chronic pain:  Continue Physical Medicine follow-up for management    PE as above.      SCREENINGS  Colonoscopy in 2013 1 polyp (TA)  Recommend repeat in 5 years     Immunizations:  Pneumovax.  2009  tdap    2/7/15  PCV 2016  Zoster:  To be done at pharmacy    Mammogram:1/29/18     DEXA 2015.  Left total hip T score is -2.1.  Right total hip T score is -2.7.  Left forearm T score is -0.3.  To be updated

## 2018-07-31 NOTE — PROGRESS NOTES
Subjective:   Patient ID:  Katie Quevedo is a 73 y.o. female who presents for follow-up of Follow-up (4 month)      Problem List Items Addressed This Visit        Pulmonary    COPD (chronic obstructive pulmonary disease) with chronic bronchitis - Primary (Chronic)       Cardiac/Vascular    Essential hypertension    Tachycardia       Renal/    CKD (chronic kidney disease), stage IV       Hematology    Current use of long term anticoagulation    History of pulmonary embolism          HPI: 71 y/o female with history of PE with cor pulmonale present for f/u. She continue to take Eliquis daily. Still having tachycardia with movement with worsening  Dyspnea on exertion. She was a smoker for 50 years before quitting 5 years ago. No chest pain. BP is controlled. No orthopnea or PND. She uses scooter to get around. She uses oxygen occasionally.   PFT showed obstructive and restrictive lung disease.   No bleeding with Eliquis.     Review of Systems   Constitution: Negative.   HENT: Negative.    Eyes: Negative.    Cardiovascular: Positive for dyspnea on exertion.   Respiratory: Negative.    Endocrine: Negative.    Hematologic/Lymphatic: Negative.    Skin: Negative.    Musculoskeletal: Negative.    Gastrointestinal: Negative.    Neurological: Negative.    Psychiatric/Behavioral: Negative.    Allergic/Immunologic: Negative.        Patient's Medications   New Prescriptions    No medications on file   Previous Medications    APIXABAN 2.5 MG TAB    Take 1 tablet (2.5 mg total) by mouth 2 (two) times daily.    CIPROFLOXACIN HCL (CIPRO) 500 MG TABLET    Take 1 tablet (500 mg total) by mouth once daily. for 7 doses    DIAZEPAM (VALIUM) 2 MG TABLET    TAKE 1 TABLET BY MOUTH DAILY & TAKE 1 TABLET AS NEEDED    DIAZEPAM (VALIUM) 5 MG TABLET    Take 1 tablet (5 mg total) by mouth daily as needed for Anxiety.    DICLOFENAC SODIUM 1 % GEL    Apply 2 g topically 3 (three) times daily.    DULOXETINE (CYMBALTA) 30 MG CAPSULE    TAKE 1 CAPSULE  BY MOUTH DAILY. IN 1-2 WEEKS, IF NO RELIEF, MAY INCREASE DOSE TO 2 CAPSULES DAILY.    ELIQUIS 5 MG TAB    Take 5 mg by mouth 2 (two) times daily.    FLUTICASONE-SALMETEROL 250-50 MCG/DOSE (ADVAIR) 250-50 MCG/DOSE DISKUS INHALER    Inhale 1 puff into the lungs 2 (two) times daily.    HYDROCODONE-ACETAMINOPHEN (NORCO)  MG PER TABLET    Take 1 tablet by mouth 3 times daily as needed.    IPRATROPIUM-ALBUTEROL (COMBIVENT)  MCG/ACTUATION INHALER    Inhale 1 puff into the lungs every 4 (four) hours as needed for Wheezing. Rescue    TIZANIDINE (ZANAFLEX) 4 MG TABLET    TAKE 1-2 TABLETS BY MOUTH THREE TIMES A DAY AS NEEDED FOR MUSCLE SPASMS   Modified Medications    No medications on file   Discontinued Medications    FLUTICASONE (FLONASE) 50 MCG/ACTUATION NASAL SPRAY    1 spray by Each Nare route once daily.    OXYCODONE-ACETAMINOPHEN (PERCOCET)  MG PER TABLET    Take 1 tablet by mouth 3 (three) times daily as needed.    PAROXETINE (PAXIL) 30 MG TABLET    Take 30 mg by mouth once daily. 2 tablets daily at night    TRIAMCINOLONE ACETONIDE 0.1% (KENALOG) 0.1 % CREAM    Use hs prn itching       Objective:   Physical Exam   Constitutional: She is oriented to person, place, and time. She appears well-developed and well-nourished. No distress.   Examination of the digits showed no clubbing or cyanosis   HENT:   Head: Normocephalic and atraumatic.   Eyes: Conjunctivae are normal. Pupils are equal, round, and reactive to light. Right eye exhibits no discharge.   Neck: Normal range of motion. Neck supple. No JVD present. No thyromegaly present.   No carotid bruits   Cardiovascular: Regular rhythm, S1 normal, S2 normal, normal heart sounds, intact distal pulses and normal pulses.  Tachycardia present.  PMI is not displaced.  Exam reveals no gallop, no friction rub and no opening snap.    No murmur heard.  Pulmonary/Chest: Effort normal. No respiratory distress. She has no wheezes. She has no rales. She exhibits no  tenderness.   Decrease breath sounds     Abdominal: Soft. Bowel sounds are normal. She exhibits no distension and no mass. There is no tenderness. There is no guarding.   No hepatosplenomegaly   Musculoskeletal: Normal range of motion. She exhibits no edema or tenderness.   Lymphadenopathy:     She has no cervical adenopathy.   Neurological: She is alert and oriented to person, place, and time.   Skin: Skin is warm. No rash noted. She is not diaphoretic. No erythema.   Psychiatric: She has a normal mood and affect.   Nursing note and vitals reviewed.      ECGs reviewed-NSR with early repolarization abnormality  LABS reviewed  Imaging including Echoes reviewed-normal ef    Assessment:     1. COPD (chronic obstructive pulmonary disease) with chronic bronchitis    2. History of pulmonary embolism    3. Essential hypertension    4. CKD (chronic kidney disease), stage IV    5. Current use of long term anticoagulation    6. Tachycardia        Plan:   Dyspnea likely from COPD/emphysema  Need to see Pulmonologist  Holter to evaluate tachycardia  Continue Eliquis life long, considering VQ scan worsening  Activity as tolerate  F/u in 4 months     The importance anticoagulation was explained to patient in details. Anticoagulation is to prevent CVA and PE and Sudden death. With anticoagulation there is risk of bleeding but benefits clearly out weight any risk. Patient advice to report any bleeding to cardiologist as soon as possible and do not discontinue medication without consent of cardiologist. All patient questions were answered regarding medication and patient verbalized understanding.

## 2018-07-31 NOTE — MEDICAL/APP STUDENT
"Subjective:       Patient ID: Katie Quevedo is a 73 y.o. female.    Chief Complaint: Follow-up    Ms. Quevedo is a 74 yo F with PMHx of COPD (on home O2), PE, CKD stage 5, osteoporosis, chronic pain who presents for follow up.  She was hospitalized in Dec 2017 for a PE, started on Eliquis 5 mg. Re-hospitalized in Mar 2018 with CP and concern for pericarditis. Had V/Q scans done which showed pulmonary infarcts. CKD found to be worsening so Eliquis dose reduced to 2.5 mg BID.   Since last hospitalization, pt has been feeling generally well. Does not occassions of dyspnea with palpitations and increased HR. Resolves when she put on supplemental oxygen and rests. Saw Cardiology yesterday, Holter monitor planned. On Advair for COPD.  She is following with Nephrology for CKD Stage V.  Osteoporosis - was on Prolia in past. No recent hx of increased bone pain or falls.  Chronic pain - managed with Norco, Cymbalta    Today she is noticing feeling more dizzy, weak, and lightheaded than usual. Has been staying hydrated. BP was difficult to measure in office.      Review of Systems   Constitutional: Negative for fatigue, fever and unexpected weight change.   HENT: Negative for ear pain and sore throat.    Eyes: Negative for pain and visual disturbance.   Respiratory: Positive for shortness of breath. Negative for cough and wheezing.    Cardiovascular: Negative for chest pain, palpitations and leg swelling.   Gastrointestinal: Negative for abdominal pain, diarrhea, nausea and vomiting.   Genitourinary: Negative for dysuria and hematuria.   Musculoskeletal: Positive for arthralgias.   Neurological: Positive for dizziness, weakness and light-headedness.       Objective:     Vitals:    07/31/18 1254   Pulse: 84   SpO2: (!) 90%   Weight: 61 kg (134 lb 7.7 oz)   Height: 5' 2" (1.575 m)     Physical Exam   Constitutional: She is oriented to person, place, and time. She appears well-developed and well-nourished. No distress.   HENT: "   Head: Normocephalic and atraumatic.   Right Ear: External ear normal.   Left Ear: External ear normal.   Eyes: Conjunctivae and EOM are normal. Pupils are equal, round, and reactive to light.   Neck: Normal range of motion. Neck supple. No thyromegaly present.   Cardiovascular: Normal rate, regular rhythm, normal heart sounds and intact distal pulses.    No murmur heard.  Soft heart sounds   Pulmonary/Chest: Effort normal. She has no wheezes.   Mildly decreased breath sounds bilaterally   Abdominal: Soft. She exhibits no distension. There is no tenderness.   Musculoskeletal: Normal range of motion. She exhibits no edema.   Neurological: She is alert and oriented to person, place, and time.   Skin: Skin is warm. Capillary refill takes less than 2 seconds.       Assessment:       1. COPD (chronic obstructive pulmonary disease) with chronic bronchitis    2. CKD (chronic kidney disease), stage V    3. Osteoporosis, unspecified osteoporosis type, unspecified pathological fracture presence    4. Pulmonary infarct    5. History of pulmonary embolism    6. Acute idiopathic pericarditis    7. Chronic neck pain    8. Lumbar postlaminectomy syndrome        Plan:       Pt drank 500 mL water in clinic, BP after was 100/50. Pt is comfortable going home and  will check BP tonight. If feeling worsening dizziness, lightheadedness, weakness, counseled pt to go to ER.     COPD (chronic obstructive pulmonary disease) with chronic bronchitis  - will send referral to pulmonology  - continue Advair    CKD (chronic kidney disease), stage V  - Nephrology following    Osteoporosis, unspecified osteoporosis type, unspecified pathological fracture presence  - DEXA scan; future  - restart Prolia after scan    Pulmonary infarct  - stable    History of Pulmonary Embolism  - continue Eliquis 2.5 mg BID    Chronic Pain  - continue Norco and Cymbalta     SCREENINGS  Colonoscopy in 2013 1 polyp (TA)  Recommend repeat in 5  years   Immunizations:  - Pneumovax.  2009  - tdap    2/7/15  - PCV 2016  - Zoster:     Mammogram:1/29/18     DEXA 2015.  Left total hip T score is -2.1.  Right total hip T score is -2.7.  Left forearm T score is -0.3    [facility]  Medical Student

## 2018-08-01 ENCOUNTER — TELEPHONE (OUTPATIENT)
Dept: FAMILY MEDICINE | Facility: CLINIC | Age: 73
End: 2018-08-01

## 2018-08-01 VITALS
HEIGHT: 62 IN | DIASTOLIC BLOOD PRESSURE: 69 MMHG | OXYGEN SATURATION: 96 % | RESPIRATION RATE: 16 BRPM | HEART RATE: 57 BPM | TEMPERATURE: 98 F | BODY MASS INDEX: 24.66 KG/M2 | WEIGHT: 134 LBS | SYSTOLIC BLOOD PRESSURE: 133 MMHG

## 2018-08-01 PROCEDURE — 81003 URINALYSIS AUTO W/O SCOPE: CPT

## 2018-08-01 NOTE — ED NOTES
Pt was seen today at clinic.  BP was low at PCP's office but patient was asymptomatic.  Pt was sent home with instructions to drink fluids and electrolytes and go to ED if blood pressure was low again.  Spouse took patient's blood pressure at home tonight and was 80/40.  Pt was asymptomatic but they came to ED for evaluation as instructed earlier today.  Pt denies any pain/discomfort/weakness or other symptoms.  Connected to cardiac monitor, automatic bp and continuous pulse oximetry.

## 2018-08-01 NOTE — TELEPHONE ENCOUNTER
----- Message from Neda Brown sent at 8/1/2018  9:34 AM CDT -----  Contact: self/[phone]  Patient would like to be seen for a sooner appointment to have her blood pressure re-checked.      Please call and advise.

## 2018-08-01 NOTE — ED PROVIDER NOTES
Encounter Date: 7/31/2018    SCRIBE #1 NOTE: I, Antonio Garibay, am scribing for, and in the presence of,  Dr. Crocker. I have scribed the entire note.       History     Chief Complaint   Patient presents with    Weakness     Patient presents to the ED with reports of having weakness and states having a low BP was in clinic today. Was told by physician to come to the ER if symptoms returned. Current BP is 105/55.     Hypotension     74 y/o F presents to the ED due to weakness, lightheadedness, and hypotension.  She was seen by a doctor today, and states he told her she looked pale and to come to the ED if symptoms continued. She denies any nausea, chest pain, or trouble breathing. Reports feeling lightheaded when standing, like she is about to pass out. Symptoms improve when sitting or lying supine. Denies any HA or dizziness.       The history is provided by the patient.     Review of patient's allergies indicates:   Allergen Reactions    Aspirin      Other reaction(s): hx of ulcers    Penicillins      Other reaction(s): Hives  Other reaction(s): Rash  Other reaction(s): Rash  Other reaction(s): Hives    Tetracycline      Other reaction(s): Swelling     Past Medical History:   Diagnosis Date    Anemia     Bilateral renal cysts     Cataract     Chronic LBP 7/26/2012    Chronic pain     CKD (chronic kidney disease), stage V     COPD (chronic obstructive pulmonary disease)     Dehydration     Encounter for blood transfusion     HTN (hypertension)     Lumbar spondylosis     Melanoma     of the lip    Metabolic bone disease     Migraines, neuralgic     Osteoporosis     Primary osteoarthritis of both knees     s/p Rt TKA    Seizures     Subdeltoid bursitis, L>R. 9/27/2012    Ulcer     Vitamin D deficiency disease      Past Surgical History:   Procedure Laterality Date    BLADDER SUSPENSION      CATARACT EXTRACTION  11/18/13    left eye    CERVICAL LAMINECTOMY      x3, fusion x1    COLONOSCOPY   2009    HYSTERECTOMY      JOINT REPLACEMENT  2001    total right knee     LUMBAR LAMINECTOMY      x 3, fusion x1    OOPHORECTOMY       Family History   Problem Relation Age of Onset    Arthritis Mother     Stroke Mother     Hypertension Father     Cancer Father     Cataracts Father     Diabetes Maternal Aunt     Hypertension Maternal Grandfather     Heart disease Maternal Grandfather     Heart attack Maternal Grandfather     Cataracts Sister     Glaucoma Cousin      Social History   Substance Use Topics    Smoking status: Former Smoker     Types: Cigarettes    Smokeless tobacco: Never Used    Alcohol use Yes      Comment: Rare     Review of Systems   Constitutional: Negative for chills, fatigue and fever.   HENT: Negative for congestion, sore throat and trouble swallowing.    Eyes: Negative for photophobia, pain and redness.   Respiratory: Negative for cough, choking and shortness of breath.    Cardiovascular: Negative for chest pain, palpitations and leg swelling.        (+) hypotension   Gastrointestinal: Negative for abdominal pain, diarrhea, nausea and vomiting.   Genitourinary: Negative for dysuria, frequency and urgency.   Musculoskeletal: Negative for back pain, neck pain and neck stiffness.   Skin: Positive for pallor.   Neurological: Positive for light-headedness. Negative for seizures, speech difficulty, numbness and headaches.   All other systems reviewed and are negative.      Physical Exam     Initial Vitals [07/31/18 2128]   BP Pulse Resp Temp SpO2   (!) 105/55 69 18 97.6 °F (36.4 °C) 96 %      MAP       --         Physical Exam    Nursing note and vitals reviewed.  Constitutional: She appears well-developed and well-nourished. No distress.   HENT:   Head: Normocephalic and atraumatic.   Mouth/Throat: Mucous membranes are dry.   Oropharynx clear   Eyes: Conjunctivae and EOM are normal. Pupils are equal, round, and reactive to light.   Neck: Normal range of motion. Neck supple. No  tracheal deviation present.   Cardiovascular: Normal rate, regular rhythm, normal heart sounds and intact distal pulses.   Pulmonary/Chest: Breath sounds normal. No respiratory distress. She has no wheezes. She has no rhonchi. She has no rales.   Abdominal: Soft. Bowel sounds are normal. She exhibits no distension. There is no tenderness.   Musculoskeletal: Normal range of motion. She exhibits no edema or tenderness.   Neurological: She is alert and oriented to person, place, and time. She has normal strength. No cranial nerve deficit or sensory deficit.   Skin: Skin is warm and dry. Capillary refill takes less than 2 seconds.         ED Course   Procedures  Labs Reviewed   CBC W/ AUTO DIFFERENTIAL - Abnormal; Notable for the following:        Result Value    RBC 3.32 (*)     Hemoglobin 9.9 (*)     Hematocrit 30.9 (*)     Eos # 0.7 (*)     Eosinophil% 8.1 (*)     All other components within normal limits   COMPREHENSIVE METABOLIC PANEL - Abnormal; Notable for the following:     Sodium 129 (*)     CO2 20 (*)     BUN, Bld 33 (*)     Creatinine 3.3 (*)     Calcium 8.6 (*)     eGFR if  15 (*)     eGFR if non  13 (*)     All other components within normal limits   TROPONIN I   URINALYSIS     EKG Readings: (Independently Interpreted)   Initial Reading: No STEMI. Previous EKG: Compared with most recent EKG Previous EKG Date: 3/5/18 (Improved). Rhythm: Normal Sinus Rhythm. Heart Rate: 64. Ectopy: No Ectopy. Conduction: 1st Degree AV Block. ST Segments: Normal ST Segments. T Waves: Normal. Axis: Normal.         X-Rays:   Independently Interpreted Readings:   Other Readings:  Imaging interpreted by radiologist and visualized by me:     Imaging Results          X-Ray Chest AP Portable (Final result)  Result time 07/31/18 23:20:01    Final result by Naila Crocker MD (07/31/18 23:20:01)                 Impression:      No acute cardiopulmonary process identified.      Electronically signed  by: Naila Crocker MD  Date:    07/31/2018  Time:    23:20             Narrative:    EXAMINATION:  XR CHEST AP PORTABLE    CLINICAL HISTORY:  Chest Pain;    TECHNIQUE:  Single frontal view of the chest was performed.    COMPARISON:  03/05/2018.    FINDINGS:  Cardiac silhouette is normal in size.  Lungs are symmetrically expanded.  No evidence of focal consolidative process, pneumothorax, or significant effusion.  No acute osseous abnormality identified.                                Medical Decision Making:   Initial Assessment:   73-year-old female presents emergency department complaining of lightheadedness and generalized weakness  Differential Diagnosis:   Arrhythmia, dehydration, electrolyte dyscrasias, pneumonia, UTI  Independently Interpreted Test(s):   I have ordered and independently interpreted X-rays - see prior notes.  I have ordered and independently interpreted EKG Reading(s) - see prior notes  Clinical Tests:   Lab Tests: Reviewed       <> Summary of Lab: Benign  ED Management:  Patient given 2 L of IV fluid.  Reports feeling much better.  No longer feels lightheaded when standing.  Instructed on home management, increasing oral hydration, follow up with primary care physician, reasons to return to the emergency room.                      Clinical Impression:     1. Dehydration    2. Weakness    3. Near syncope      Disposition:   Disposition: Discharged  Condition: Stable    Scribe Attestation I, Dr. Prashant Crocker, personally performed the services described in this documentation. All medical record entries made by the scribe were at my direction and in my presence.  I have reviewed the chart and agree that the record reflects my personal performance and is accurate and complete. Prashant Crocker MD.  1:46 PM 08/03/2018                        Prashant Crocker MD  08/03/18 1960

## 2018-08-03 ENCOUNTER — HOSPITAL ENCOUNTER (OUTPATIENT)
Dept: RADIOLOGY | Facility: HOSPITAL | Age: 73
Discharge: HOME | End: 2018-08-03
Attending: FAMILY MEDICINE
Payer: MEDICARE

## 2018-08-03 ENCOUNTER — TELEPHONE (OUTPATIENT)
Dept: RADIOLOGY | Facility: HOSPITAL | Age: 73
End: 2018-08-03

## 2018-08-03 ENCOUNTER — PATIENT MESSAGE (OUTPATIENT)
Dept: FAMILY MEDICINE | Facility: CLINIC | Age: 73
End: 2018-08-03

## 2018-08-03 DIAGNOSIS — M81.0 OSTEOPOROSIS, UNSPECIFIED OSTEOPOROSIS TYPE, UNSPECIFIED PATHOLOGICAL FRACTURE PRESENCE: ICD-10-CM

## 2018-08-03 PROCEDURE — 77080 DXA BONE DENSITY AXIAL: CPT | Mod: TC

## 2018-08-03 PROCEDURE — 77080 DXA BONE DENSITY AXIAL: CPT | Mod: 26,,, | Performed by: RADIOLOGY

## 2018-08-08 ENCOUNTER — LAB VISIT (OUTPATIENT)
Dept: LAB | Facility: HOSPITAL | Age: 73
End: 2018-08-08
Attending: FAMILY MEDICINE
Payer: MEDICARE

## 2018-08-08 ENCOUNTER — PATIENT MESSAGE (OUTPATIENT)
Dept: FAMILY MEDICINE | Facility: CLINIC | Age: 73
End: 2018-08-08

## 2018-08-08 ENCOUNTER — OFFICE VISIT (OUTPATIENT)
Dept: FAMILY MEDICINE | Facility: CLINIC | Age: 73
End: 2018-08-08
Payer: MEDICARE

## 2018-08-08 VITALS
SYSTOLIC BLOOD PRESSURE: 148 MMHG | BODY MASS INDEX: 24.7 KG/M2 | TEMPERATURE: 98 F | WEIGHT: 134.25 LBS | HEIGHT: 62 IN | OXYGEN SATURATION: 97 % | HEART RATE: 83 BPM | DIASTOLIC BLOOD PRESSURE: 79 MMHG

## 2018-08-08 DIAGNOSIS — N18.5 CKD (CHRONIC KIDNEY DISEASE), STAGE V: Primary | ICD-10-CM

## 2018-08-08 DIAGNOSIS — J44.89 COPD (CHRONIC OBSTRUCTIVE PULMONARY DISEASE) WITH CHRONIC BRONCHITIS: ICD-10-CM

## 2018-08-08 DIAGNOSIS — I26.99 PULMONARY INFARCT: ICD-10-CM

## 2018-08-08 DIAGNOSIS — Z86.711 HISTORY OF PULMONARY EMBOLISM: ICD-10-CM

## 2018-08-08 DIAGNOSIS — N18.5 CKD (CHRONIC KIDNEY DISEASE), STAGE V: ICD-10-CM

## 2018-08-08 LAB
ALBUMIN SERPL BCP-MCNC: 3.9 G/DL
ALP SERPL-CCNC: 95 U/L
ALT SERPL W/O P-5'-P-CCNC: 10 U/L
ANION GAP SERPL CALC-SCNC: 9 MMOL/L
AST SERPL-CCNC: 18 U/L
BASOPHILS # BLD AUTO: 0.04 K/UL
BASOPHILS NFR BLD: 0.5 %
BILIRUB SERPL-MCNC: 0.3 MG/DL
BUN SERPL-MCNC: 15 MG/DL
CALCIUM SERPL-MCNC: 9.9 MG/DL
CHLORIDE SERPL-SCNC: 102 MMOL/L
CO2 SERPL-SCNC: 26 MMOL/L
CREAT SERPL-MCNC: 2.6 MG/DL
DIFFERENTIAL METHOD: ABNORMAL
EOSINOPHIL # BLD AUTO: 0.5 K/UL
EOSINOPHIL NFR BLD: 6.5 %
ERYTHROCYTE [DISTWIDTH] IN BLOOD BY AUTOMATED COUNT: 13.9 %
EST. GFR  (AFRICAN AMERICAN): 20 ML/MIN/1.73 M^2
EST. GFR  (NON AFRICAN AMERICAN): 17 ML/MIN/1.73 M^2
GLUCOSE SERPL-MCNC: 87 MG/DL
HCT VFR BLD AUTO: 33.2 %
HGB BLD-MCNC: 10.4 G/DL
LYMPHOCYTES # BLD AUTO: 1.9 K/UL
LYMPHOCYTES NFR BLD: 23.5 %
MCH RBC QN AUTO: 28.3 PG
MCHC RBC AUTO-ENTMCNC: 31.3 G/DL
MCV RBC AUTO: 91 FL
MONOCYTES # BLD AUTO: 0.7 K/UL
MONOCYTES NFR BLD: 8.1 %
NEUTROPHILS # BLD AUTO: 5 K/UL
NEUTROPHILS NFR BLD: 61.2 %
PLATELET # BLD AUTO: 347 K/UL
PMV BLD AUTO: 9.5 FL
POTASSIUM SERPL-SCNC: 4.4 MMOL/L
PROT SERPL-MCNC: 7.5 G/DL
RBC # BLD AUTO: 3.67 M/UL
SODIUM SERPL-SCNC: 137 MMOL/L
WBC # BLD AUTO: 8.12 K/UL

## 2018-08-08 PROCEDURE — 80053 COMPREHEN METABOLIC PANEL: CPT

## 2018-08-08 PROCEDURE — 36415 COLL VENOUS BLD VENIPUNCTURE: CPT

## 2018-08-08 PROCEDURE — 3077F SYST BP >= 140 MM HG: CPT | Mod: CPTII,S$GLB,, | Performed by: FAMILY MEDICINE

## 2018-08-08 PROCEDURE — 85025 COMPLETE CBC W/AUTO DIFF WBC: CPT

## 2018-08-08 PROCEDURE — 99214 OFFICE O/P EST MOD 30 MIN: CPT | Mod: S$GLB,,, | Performed by: FAMILY MEDICINE

## 2018-08-08 PROCEDURE — 99999 PR PBB SHADOW E&M-EST. PATIENT-LVL IV: CPT | Mod: PBBFAC,,, | Performed by: FAMILY MEDICINE

## 2018-08-08 PROCEDURE — 3078F DIAST BP <80 MM HG: CPT | Mod: CPTII,S$GLB,, | Performed by: FAMILY MEDICINE

## 2018-08-08 RX ORDER — IPRATROPIUM BROMIDE AND ALBUTEROL SULFATE 2.5; .5 MG/3ML; MG/3ML
3 SOLUTION RESPIRATORY (INHALATION) 3 TIMES DAILY PRN
Qty: 1 BOX | Refills: 11 | Status: SHIPPED | OUTPATIENT
Start: 2018-08-08 | End: 2019-09-12 | Stop reason: SDUPTHER

## 2018-08-08 RX ORDER — FLUTICASONE PROPIONATE AND SALMETEROL 500; 50 UG/1; UG/1
1 POWDER RESPIRATORY (INHALATION) 2 TIMES DAILY
Qty: 60 EACH | Refills: 11 | Status: SHIPPED | OUTPATIENT
Start: 2018-08-08 | End: 2019-11-05

## 2018-08-08 NOTE — PROGRESS NOTES
(Portions of this note were dictated using voice recognition software and may contain dictation related errors in spelling/grammar/syntax not found on text review)    CC:   Chief Complaint   Patient presents with    Follow-up     ER follow up       HPI: 73 y.o. female Last seen by me on 07/31/2018 for medical follow-up following multiple hospitalizations.  These included PE with pulmonary infarcts, also progression of chronic kidney disease stage 5 (followed by Nephrology, last office visit 07/30/2018.  She also has coexisting COPD on Advair.  She is currently anticoagulated on Eliquis dose to 2.5 b.i.d. secondary to her kidney disease.  She had presented feeling very weak and dizzy, blood pressure was difficult to obtain.  After drinking a 20 oz water, her blood pressure was improved a bit to 100/60 and she had reported feeling a bit better.  ER precautions were given for continued hypotensive issues.  Does appear to have gone to the ER later on that night.  Labs as noted below.  Chest x-ray was done which was clear  Received 2 L of normal saline.  Patient had reported improvement to the ER staff and was discharged for outpatient follow-up    Overall feels better today.  No longer weak and dizzy.  Blood pressure is elevated today.  She is not on any blood pressure therapy.  No NSAIDs.  Denies any chest pain, nausea, vomiting, diarrhea, abdominal pain    COPD:  On Advair 250/50.  Does use Combivent about 3 times week secondary to shortness of breath.  Overall she feels like her respiratory status is stable.  She does have an upcoming appoint with pulmonary through [facility].  She would like to try nebulizer for her rescue however            Past Medical History:   Diagnosis Date    Anemia     Bilateral renal cysts     Cataract     Chronic LBP 7/26/2012    Chronic pain     CKD (chronic kidney disease), stage V     COPD (chronic obstructive pulmonary disease)     Dehydration     Encounter for blood transfusion      HTN (hypertension)     Lumbar spondylosis     Melanoma     of the lip    Metabolic bone disease     Migraines, neuralgic     Osteoporosis     Primary osteoarthritis of both knees     s/p Rt TKA    Seizures     Subdeltoid bursitis, L>R. 9/27/2012    Ulcer     Vitamin D deficiency disease        Past Surgical History:   Procedure Laterality Date    BLADDER SUSPENSION      CATARACT EXTRACTION  11/18/13    left eye    CERVICAL LAMINECTOMY      x3, fusion x1    COLONOSCOPY  2009    HYSTERECTOMY      JOINT REPLACEMENT  2001    total right knee     LUMBAR LAMINECTOMY      x 3, fusion x1    OOPHORECTOMY         Family History   Problem Relation Age of Onset    Arthritis Mother     Stroke Mother     Hypertension Father     Cancer Father     Cataracts Father     Diabetes Maternal Aunt     Hypertension Maternal Grandfather     Heart disease Maternal Grandfather     Heart attack Maternal Grandfather     Cataracts Sister     Glaucoma Cousin        Social History     Social History    Marital status:      Spouse name: N/A    Number of children: N/A    Years of education: N/A     Occupational History    Not on file.     Social History Main Topics    Smoking status: Former Smoker     Types: Cigarettes    Smokeless tobacco: Never Used    Alcohol use Yes      Comment: Rare    Drug use: No    Sexual activity: No     Other Topics Concern    Not on file     Social History Narrative    No narrative on file     Lab Results   Component Value Date    WBC 8.69 07/31/2018    HGB 9.9 (L) 07/31/2018    HCT 30.9 (L) 07/31/2018     07/31/2018    CHOL 183 03/05/2018    TRIG 139 03/05/2018    HDL 52 03/05/2018    ALT 10 07/31/2018    AST 29 07/31/2018     (L) 07/31/2018    K 5.0 07/31/2018    CL 99 07/31/2018    CREATININE 3.3 (H) 07/31/2018    CALCIUM 8.6 (L) 07/31/2018    BUN 33 (H) 07/31/2018    CO2 20 (L) 07/31/2018    TSH 2.694 01/22/2018    INR 0.9 03/05/2018    HGBA1C 5.2  01/22/2018    LDLCALC 103.2 03/05/2018    GLU 82 07/31/2018         Creatinine   Date Value Ref Range Status   07/31/2018 3.3 (H) 0.5 - 1.4 mg/dL Final   07/24/2018 3.1 (H) 0.5 - 1.4 mg/dL Final   06/21/2018 2.7 (H) 0.5 - 1.4 mg/dL Final   06/15/2018 2.7 (H) 0.5 - 1.4 mg/dL Final   03/22/2018 2.0 (H) 0.5 - 1.4 mg/dL Final       Hemoglobin   Date Value Ref Range Status   07/31/2018 9.9 (L) 12.0 - 16.0 g/dL Final   07/24/2018 11.1 (L) 12.0 - 16.0 g/dL Final   06/15/2018 9.8 (L) 12.0 - 16.0 g/dL Final   03/05/2018 10.3 (L) 12.0 - 16.0 g/dL Final   01/22/2018 11.7 (L) 12.0 - 16.0 g/dL Final     Sodium   Date Value Ref Range Status   07/31/2018 129 (L) 136 - 145 mmol/L Final   07/24/2018 139 136 - 145 mmol/L Final   06/15/2018 134 (L) 136 - 145 mmol/L Final   03/22/2018 140 136 - 145 mmol/L Final   03/06/2018 139 136 - 145 mmol/L Final         ROS:  GENERAL:  No lightheadedness or dizziness.  SKIN: No rashes, no itching.  HEAD: No headaches.  EYES: No visual changes  EARS: No ear pain or changes in hearing.  NOSE: No congestion or rhinorrhea.  MOUTH & THROAT: No hoarseness, change in voice, or sore throat.  NODES: Denies swollen glands.  CHEST:  Above.  CARDIOVASCULAR: Denies chest pain, PND, orthopnea.  ABDOMEN: No nausea, vomiting, or changes in bowel function.  URINARY: No flank pain, dysuria or hematuria.  PERIPHERAL VASCULAR: No claudication or cyanosis.  MUSCULOSKELETAL:  Chronic back pain, no acute issues.  NEUROLOGIC: No weakness or numbness.    Vital signs reviewed  PE:   APPEARANCE: Well nourished, well developed, in no acute distress.    HEAD: Normocephalic, atraumatic.  EYES: PERRL. EOMI.   Conjunctivae noninjected.  EARS: TM's intact. Light reflex normal. No retraction or perforation  NOSE: Mucosa pink. Airway clear.  MOUTH & THROAT: No tonsillar enlargement. No pharyngeal erythema or exudate.   NECK: Supple with no cervical lymphadenopathy.  No carotid bruits.  No thyromegaly  CHEST: Good inspiratory  effort. Lungs clear to auscultation with no wheezes or crackles.  CARDIOVASCULAR: Normal S1, S2. No rubs, murmurs, or gallops.  ABDOMEN: Bowel sounds normal. Not distended. Soft. No tenderness or masses. No organomegaly.  EXTREMITIES: No edema, cyanosis, or clubbing.      IMPRESSION  1. CKD (chronic kidney disease), stage V    2. COPD (chronic obstructive pulmonary disease) with chronic bronchitis    3. History of pulmonary embolism    4. Pulmonary infarct            PLAN  Reviewed ED documentation and labs as noted above    Much better after fluids.  Blood pressure is elevated but certainly will be temporarily permitted at this time given her significant hypertension on prior visit.  Continue to monitor.  Will consider further evaluation and management if blood pressure stayed persistently elevated    Recheck labs today as below    COPD:  Given fairly frequent use of her rescue treatment, will increase her Advair to 500/50 1 puff b.i.d..  Will give her DuoNeb nebulizer solution prescription in case she feels like the nebulizers more effective then her inhaler    Follow-up with Pulmonary as directed      Orders Placed This Encounter   Procedures    CBC auto differential    Comprehensive metabolic panel

## 2018-08-13 ENCOUNTER — HOSPITAL ENCOUNTER (OUTPATIENT)
Dept: CARDIOLOGY | Facility: HOSPITAL | Age: 73
Discharge: HOME | End: 2018-08-13
Attending: INTERNAL MEDICINE
Payer: MEDICARE

## 2018-08-13 DIAGNOSIS — R00.0 TACHYCARDIA: ICD-10-CM

## 2018-08-13 PROCEDURE — 93227 XTRNL ECG REC<48 HR R&I: CPT | Mod: ,,, | Performed by: INTERNAL MEDICINE

## 2018-08-13 PROCEDURE — 93226 XTRNL ECG REC<48 HR SCAN A/R: CPT

## 2018-08-23 RX ORDER — HYDROCODONE BITARTRATE AND ACETAMINOPHEN 10; 325 MG/1; MG/1
TABLET ORAL
Qty: 90 TABLET | Refills: 0 | Status: SHIPPED | OUTPATIENT
Start: 2018-08-23 | End: 2018-09-24 | Stop reason: SDUPTHER

## 2018-08-23 NOTE — TELEPHONE ENCOUNTER
07/22/18 last Rx refill  07/05/18 last office visit  11/05/18 RTC      ----- Message from Shila Ambriz sent at 8/23/2018 12:54 PM CDT -----  Contact: self @ [phone]  Pt is req a refill for hydrocodone 10/325.    [facility] [place] - [address]                  [phone] (Phone)               [phone] (Fax)

## 2018-09-14 ENCOUNTER — PES CALL (OUTPATIENT)
Dept: ADMINISTRATIVE | Facility: CLINIC | Age: 73
End: 2018-09-14

## 2018-09-15 DIAGNOSIS — M75.51 SUBDELTOID BURSITIS, RIGHT: ICD-10-CM

## 2018-09-15 DIAGNOSIS — G89.29 CHRONIC NECK PAIN: ICD-10-CM

## 2018-09-15 DIAGNOSIS — M54.2 CHRONIC NECK PAIN: ICD-10-CM

## 2018-09-17 RX ORDER — TIZANIDINE 4 MG/1
TABLET ORAL
Qty: 90 TABLET | Refills: 1 | Status: SHIPPED | OUTPATIENT
Start: 2018-09-17 | End: 2018-10-04 | Stop reason: SDUPTHER

## 2018-09-18 ENCOUNTER — INFUSION (OUTPATIENT)
Dept: INFUSION THERAPY | Facility: HOSPITAL | Age: 73
End: 2018-09-18
Attending: FAMILY MEDICINE
Payer: MEDICARE

## 2018-09-18 VITALS — HEIGHT: 62 IN | WEIGHT: 138 LBS | BODY MASS INDEX: 25.4 KG/M2

## 2018-09-18 DIAGNOSIS — M81.0 OSTEOPOROSIS, UNSPECIFIED OSTEOPOROSIS TYPE, UNSPECIFIED PATHOLOGICAL FRACTURE PRESENCE: ICD-10-CM

## 2018-09-18 DIAGNOSIS — N18.5 CKD (CHRONIC KIDNEY DISEASE), STAGE V: Primary | ICD-10-CM

## 2018-09-18 PROCEDURE — 63600175 PHARM REV CODE 636 W HCPCS: Mod: JG | Performed by: FAMILY MEDICINE

## 2018-09-18 PROCEDURE — 96372 THER/PROPH/DIAG INJ SC/IM: CPT

## 2018-09-18 RX ORDER — FERROUS SULFATE, DRIED 160(50) MG
1 TABLET, EXTENDED RELEASE ORAL 2 TIMES DAILY WITH MEALS
Status: ON HOLD | COMMUNITY
End: 2020-02-03

## 2018-09-18 RX ADMIN — DENOSUMAB 60 MG: 60 INJECTION SUBCUTANEOUS at 01:09

## 2018-09-24 ENCOUNTER — PATIENT MESSAGE (OUTPATIENT)
Dept: FAMILY MEDICINE | Facility: CLINIC | Age: 73
End: 2018-09-24

## 2018-09-24 ENCOUNTER — TELEPHONE (OUTPATIENT)
Dept: FAMILY MEDICINE | Facility: CLINIC | Age: 73
End: 2018-09-24

## 2018-09-24 RX ORDER — HYDROCODONE BITARTRATE AND ACETAMINOPHEN 10; 325 MG/1; MG/1
TABLET ORAL
Qty: 90 TABLET | Refills: 0 | Status: SHIPPED | OUTPATIENT
Start: 2018-09-25 | End: 2018-10-25 | Stop reason: SDUPTHER

## 2018-09-24 NOTE — TELEPHONE ENCOUNTER
----- Message from Kerry Castro RN sent at 9/10/2018 10:39 AM CDT -----  Regarding: RE: Prolia  Hi Dr. Verduzco,    I scheduled Ms. Quevedo for her Prolia injection for the 18th, per her request.  She told me she does not take any Ca or Vit D supplements, which are recommended for patients receiving Prolia.     Can you prescribe Ca and Vit D for her so she can begin taking the supplements as soon as possible.    Thanks!  Kerry  ----- Message -----  From: Kingston Verduzco MD  Sent: 9/7/2018   3:06 PM  To: Kerry Castro RN  Subject: RE: Prolia                                       Yes please, I don't believe prolia has any renal restriction.   ----- Message -----  From: Kerry Castro RN  Sent: 9/7/2018   2:42 PM  To: Kingston Verduzco MD  Subject: FW: Prolia                                           ----- Message -----  From: Kerry Castro RN  Sent: 8/27/2018  12:09 PM  To: Kingston Verduzco MD  Subject: Prolia                                           Hi Dr. Verduzco,    In reviewing patient's labs I see her creatinine is 2.6.  Do you still want her to receive Prolia<    Please advise.  Kerry

## 2018-09-24 NOTE — TELEPHONE ENCOUNTER
----- Message from Camilo Devine sent at 9/24/2018  4:13 PM CDT -----  Rx Refill/Request     Is this a Refill or New Rx: Refill    Rx Name and Strength:  HYDROcodone-acetaminophen (NORCO)  mg per tablet  Preferred Pharmacy with phone number: see below  Communication Preference: [phone]  Additional Information:     [facility] [place] - [address]  [address]  [place] [address]  Phone: [phone] Fax: [phone]

## 2018-09-24 NOTE — TELEPHONE ENCOUNTER
----- Message from Yasemin Pulido sent at 9/24/2018 11:07 AM CDT -----  Contact: sELF  She is calling to get a refill of her HYDROcodone-acetaminophen (NORCO)  mg per tablet called in to the pharmacy on file. Please advise once this has been done.

## 2018-10-04 DIAGNOSIS — M75.51 SUBDELTOID BURSITIS, RIGHT: ICD-10-CM

## 2018-10-04 DIAGNOSIS — G89.29 CHRONIC NECK PAIN: ICD-10-CM

## 2018-10-04 DIAGNOSIS — M54.2 CHRONIC NECK PAIN: ICD-10-CM

## 2018-10-04 RX ORDER — TIZANIDINE 4 MG/1
TABLET ORAL
Qty: 90 TABLET | Refills: 1 | Status: SHIPPED | OUTPATIENT
Start: 2018-10-04 | End: 2018-10-26 | Stop reason: SDUPTHER

## 2018-10-04 NOTE — TELEPHONE ENCOUNTER
-    09/17/18 last Rx refill   07/05/18 last office visit  11/05/08 RTC      ---- Message from Uzair Kim sent at 10/4/2018  2:52 PM CDT -----  Contact: Patient @ [phone]  Rx Refill/Request     Is this a Refill or New Rx:  Yes   Rx Name and Strength:  tiZANidine (ZANAFLEX) 4 MG tablet    Preferred Pharmacy with phone number:     [facility] [place] - [address] [facility] [address]  [address]  [place] [address]  Phone: [phone] Fax: [phone]

## 2018-10-25 RX ORDER — HYDROCODONE BITARTRATE AND ACETAMINOPHEN 10; 325 MG/1; MG/1
TABLET ORAL
Qty: 90 TABLET | Refills: 0 | Status: SHIPPED | OUTPATIENT
Start: 2018-10-25 | End: 2018-11-21 | Stop reason: SDUPTHER

## 2018-10-25 NOTE — TELEPHONE ENCOUNTER
----- Message from Camilo Devine sent at 10/25/2018 10:10 AM CDT -----  Rx Refill/Request     Is this a Refill or New Rx:  Refill  Rx Name and Strength:  HYDROcodone-acetaminophen (NORCO)  mg per tablet  Preferred Pharmacy with phone number: see below  Communication Preference: [phone]  Additional Information:     [facility] - [place] - [address] [facility] [address]  [address] [facility] [address]  [place] [address]  Phone: [phone] Fax: [phone]

## 2018-10-26 DIAGNOSIS — M75.51 SUBDELTOID BURSITIS, RIGHT: ICD-10-CM

## 2018-10-26 DIAGNOSIS — M54.2 CHRONIC NECK PAIN: ICD-10-CM

## 2018-10-26 DIAGNOSIS — G89.29 CHRONIC NECK PAIN: ICD-10-CM

## 2018-10-28 RX ORDER — TIZANIDINE 4 MG/1
TABLET ORAL
Qty: 90 TABLET | Refills: 1 | Status: SHIPPED | OUTPATIENT
Start: 2018-10-28 | End: 2018-12-19 | Stop reason: SDUPTHER

## 2018-10-30 ENCOUNTER — LAB VISIT (OUTPATIENT)
Dept: LAB | Facility: HOSPITAL | Age: 73
End: 2018-10-30
Attending: INTERNAL MEDICINE
Payer: MEDICARE

## 2018-10-30 DIAGNOSIS — N18.5 CKD (CHRONIC KIDNEY DISEASE), SYMPTOM MANAGEMENT ONLY, STAGE 5: ICD-10-CM

## 2018-10-30 LAB
ALBUMIN SERPL BCP-MCNC: 3.4 G/DL
ANION GAP SERPL CALC-SCNC: 9 MMOL/L
BASOPHILS # BLD AUTO: 0.04 K/UL
BASOPHILS NFR BLD: 0.5 %
BUN SERPL-MCNC: 21 MG/DL
CALCIUM SERPL-MCNC: 8.7 MG/DL
CHLORIDE SERPL-SCNC: 104 MMOL/L
CO2 SERPL-SCNC: 24 MMOL/L
CREAT SERPL-MCNC: 2.4 MG/DL
DIFFERENTIAL METHOD: ABNORMAL
EOSINOPHIL # BLD AUTO: 1.1 K/UL
EOSINOPHIL NFR BLD: 13.4 %
ERYTHROCYTE [DISTWIDTH] IN BLOOD BY AUTOMATED COUNT: 13.9 %
EST. GFR  (AFRICAN AMERICAN): 22 ML/MIN/1.73 M^2
EST. GFR  (NON AFRICAN AMERICAN): 19 ML/MIN/1.73 M^2
GLUCOSE SERPL-MCNC: 108 MG/DL
HCT VFR BLD AUTO: 32.7 %
HGB BLD-MCNC: 10.4 G/DL
LYMPHOCYTES # BLD AUTO: 2 K/UL
LYMPHOCYTES NFR BLD: 23.7 %
MAGNESIUM SERPL-MCNC: 2.2 MG/DL
MCH RBC QN AUTO: 30.9 PG
MCHC RBC AUTO-ENTMCNC: 31.8 G/DL
MCV RBC AUTO: 97 FL
MONOCYTES # BLD AUTO: 0.7 K/UL
MONOCYTES NFR BLD: 8.7 %
NEUTROPHILS # BLD AUTO: 4.5 K/UL
NEUTROPHILS NFR BLD: 53.5 %
PHOSPHATE SERPL-MCNC: 2.1 MG/DL
PLATELET # BLD AUTO: 320 K/UL
PMV BLD AUTO: 9.3 FL
POTASSIUM SERPL-SCNC: 4.7 MMOL/L
RBC # BLD AUTO: 3.37 M/UL
SODIUM SERPL-SCNC: 137 MMOL/L
WBC # BLD AUTO: 8.31 K/UL

## 2018-10-30 PROCEDURE — 36415 COLL VENOUS BLD VENIPUNCTURE: CPT

## 2018-10-30 PROCEDURE — 84100 ASSAY OF PHOSPHORUS: CPT

## 2018-10-30 PROCEDURE — 82040 ASSAY OF SERUM ALBUMIN: CPT

## 2018-10-30 PROCEDURE — 83735 ASSAY OF MAGNESIUM: CPT

## 2018-10-30 PROCEDURE — 85025 COMPLETE CBC W/AUTO DIFF WBC: CPT

## 2018-10-30 PROCEDURE — 80048 BASIC METABOLIC PNL TOTAL CA: CPT

## 2018-11-08 ENCOUNTER — OFFICE VISIT (OUTPATIENT)
Dept: PHYSICAL MEDICINE AND REHAB | Facility: CLINIC | Age: 73
End: 2018-11-08
Payer: MEDICARE

## 2018-11-08 ENCOUNTER — TELEPHONE (OUTPATIENT)
Dept: PHYSICAL MEDICINE AND REHAB | Facility: CLINIC | Age: 73
End: 2018-11-08

## 2018-11-08 VITALS
WEIGHT: 137.81 LBS | HEART RATE: 119 BPM | HEIGHT: 62 IN | SYSTOLIC BLOOD PRESSURE: 186 MMHG | BODY MASS INDEX: 25.36 KG/M2 | DIASTOLIC BLOOD PRESSURE: 84 MMHG

## 2018-11-08 DIAGNOSIS — M54.41 CHRONIC MIDLINE LOW BACK PAIN WITH RIGHT-SIDED SCIATICA: Primary | ICD-10-CM

## 2018-11-08 DIAGNOSIS — G89.29 CHRONIC MIDLINE LOW BACK PAIN WITH RIGHT-SIDED SCIATICA: Primary | ICD-10-CM

## 2018-11-08 DIAGNOSIS — M54.2 CHRONIC NECK PAIN: ICD-10-CM

## 2018-11-08 DIAGNOSIS — M17.0 PRIMARY OSTEOARTHRITIS OF BOTH KNEES: ICD-10-CM

## 2018-11-08 DIAGNOSIS — M47.26 OSTEOARTHRITIS OF SPINE WITH RADICULOPATHY, LUMBAR REGION: ICD-10-CM

## 2018-11-08 DIAGNOSIS — G89.29 CHRONIC RIGHT SHOULDER PAIN: ICD-10-CM

## 2018-11-08 DIAGNOSIS — M96.1 LUMBAR POSTLAMINECTOMY SYNDROME: ICD-10-CM

## 2018-11-08 DIAGNOSIS — M16.0 PRIMARY OSTEOARTHRITIS OF BOTH HIPS: ICD-10-CM

## 2018-11-08 DIAGNOSIS — M25.511 CHRONIC RIGHT SHOULDER PAIN: ICD-10-CM

## 2018-11-08 DIAGNOSIS — G89.29 CHRONIC NECK PAIN: ICD-10-CM

## 2018-11-08 DIAGNOSIS — M75.51 SUBDELTOID BURSITIS, RIGHT: ICD-10-CM

## 2018-11-08 DIAGNOSIS — M96.1 CERVICAL POST-LAMINECTOMY SYNDROME: ICD-10-CM

## 2018-11-08 PROCEDURE — 3077F SYST BP >= 140 MM HG: CPT | Mod: CPTII,S$GLB,, | Performed by: PHYSICAL MEDICINE & REHABILITATION

## 2018-11-08 PROCEDURE — 3288F FALL RISK ASSESSMENT DOCD: CPT | Mod: CPTII,S$GLB,, | Performed by: PHYSICAL MEDICINE & REHABILITATION

## 2018-11-08 PROCEDURE — 1100F PTFALLS ASSESS-DOCD GE2>/YR: CPT | Mod: CPTII,S$GLB,, | Performed by: PHYSICAL MEDICINE & REHABILITATION

## 2018-11-08 PROCEDURE — 3079F DIAST BP 80-89 MM HG: CPT | Mod: CPTII,S$GLB,, | Performed by: PHYSICAL MEDICINE & REHABILITATION

## 2018-11-08 PROCEDURE — 99999 PR PBB SHADOW E&M-EST. PATIENT-LVL III: CPT | Mod: PBBFAC,,, | Performed by: PHYSICAL MEDICINE & REHABILITATION

## 2018-11-08 PROCEDURE — 99214 OFFICE O/P EST MOD 30 MIN: CPT | Mod: S$GLB,,, | Performed by: PHYSICAL MEDICINE & REHABILITATION

## 2018-11-08 RX ORDER — DULOXETIN HYDROCHLORIDE 60 MG/1
60 CAPSULE, DELAYED RELEASE ORAL 2 TIMES DAILY
Qty: 30 CAPSULE | Refills: 3 | Status: SHIPPED | OUTPATIENT
Start: 2018-11-08 | End: 2018-11-08 | Stop reason: SDUPTHER

## 2018-11-08 RX ORDER — DULOXETIN HYDROCHLORIDE 60 MG/1
60 CAPSULE, DELAYED RELEASE ORAL DAILY
Qty: 30 CAPSULE | Refills: 3 | Status: ON HOLD | OUTPATIENT
Start: 2018-11-08 | End: 2018-12-28 | Stop reason: HOSPADM

## 2018-11-08 NOTE — PROGRESS NOTES
Subjective:       Patient ID: Katie Quevedo is a 73 y.o. female.    Chief Complaint: No chief complaint on file.    HPI    HISTORY OF PRESENT ILLNESS:  Mrs. Quevedo is a 73-year-old white female with past   medical history of pulmonary hypertension, on Eliquis therapy and generalized   osteoarthritis.  She is followed up in the [facility] for chronic   low back pain with lumbar radiculopathy, post-laminectomy syndrome, chronic neck   pain with cervical radiculopathy, post-cervical laminectomy syndrome and   recurrent right subdeltoid bursitis.  Her last visit to the clinic was on   07/05/2018.  She was maintained on hydrocodone, p.r.n. tizanidine and p.r.n.   trazodone and started on duloxetine.    The patient comes today to the clinic for followup.  Her low back pain has been   stable.  It is a constant aching pain in the lumbar spine.  She has occasional   radiation to the right foot.  Her maximum pain is 10/10 and minimum 6/10.    Today, it is 7/10.  The patient complains of bilateral lower extremity weakness   without change.  She denies any bowel or bladder incontinence.    Her neck pain has also been stable.  It is a constant aching pain in the   cervical spine.  It is usually localized.  It is worse with excess movement.    Her maximum pain is 9-10/10 and minimum 5/10.  Today, it is 5/10.  The patient   has chronic right hand  weakness without change.  She denies any upper   extremity numbness.    She is currently taking Cymbalta 30 mg p.o. daily, but not consistently.  She   takes hydrocodone/APAP 10/325 p.r.n., usually three times per day.  She takes   tizanidine 4 mg p.r.n. for muscle spasm, usually two times per day.  She uses   Voltaren gel topically as needed.      MS/HN  dd: 11/08/2018 14:01:37 (CST)  td: 11/09/2018 04:46:37 (CST)  Doc ID   #[id number]  Job ID #[id number]    CC:         Review of Systems   Constitutional: Positive for fatigue.   Eyes: Negative for visual disturbance.    Respiratory: Negative for shortness of breath.    Cardiovascular: Negative for chest pain.   Gastrointestinal: Negative for constipation, nausea and vomiting.   Genitourinary: Negative for difficulty urinating.   Musculoskeletal: Positive for back pain, gait problem and neck pain.   Neurological: Negative for dizziness and headaches.   Psychiatric/Behavioral: Positive for sleep disturbance. Negative for behavioral problems.       Objective:      Physical Exam   Constitutional: She is oriented to person, place, and time. She appears well-developed and well-nourished.   Coming to the clinic in an electric scooter.   Neck:   Decreased ROM.  Mild tenderness.   Musculoskeletal:   BUE:  ROM: decreased at shoulder abduction, Rt worse than Lt.  +ve Heberden's & Nilsa's nodes.  Strength:    RUE: 3+/5 at shoulder abduction, 5- elbow flexion, elbow extension, hand .   LUE: 4/5 at shoulder abduction, 5- elbow flexion, elbow extension, hand .  Sensation to pinprick:   RUE: mild decrease.   LUE: intact.      BLE:  ROM:full.  +ve bilateral knee crepitus.   Strength:      RLE: 5/5 at hip flexion, knee extension, ankle DF/PF, EHL.     LLE:  5/5 at hip flexion, knee extension, ankle DF/PF, EHL.  Sensation to pinprick:     RLE: mild decrease.      LLE: intact.   SLR (sitting):      RLE: +ve.      LLE: -ve.       Neurological: She is alert and oriented to person, place, and time.   Psychiatric: She has a normal mood and affect.   Vitals reviewed.        Assessment:       1. Chronic midline low back pain with right-sided sciatica    2. Osteoarthritis of spine with radiculopathy, lumbar region    3. Lumbar postlaminectomy syndrome    4. Chronic neck pain    5. Cervical post-laminectomy syndrome    6. Primary osteoarthritis of both knees    7. Primary osteoarthritis of both hips    8. Subdeltoid bursitis, right    9. Chronic right shoulder pain        Plan:       - Continue hydrocodone-acetaminophen (PERCOCET)  mg per  tablet; Take 1 tablet by mouth 3 (three) times daily as needed for Pain.  - Increase DULoxetine (CYMBALTA) 60 MG capsule; Take 1 capsule (60 mg total) by mouth once daily.   - Continue diclofenac sodium 1 % Gel; Apply 2 g topically 3 (three) times daily.  - Continue tizanidine (ZANAFLEX) 4 MG tablet; Take 1 tablet (4 mg total) by mouth 3 (three) times daily as needed.  - The patient is still not interested in referral for ESIs.  - Regular home exercise program was encouraged.  - Follow-up in about 4 months (around 3/8/2019).      This was a 25 minute visit, more than 50% of which was spent counseling the patient about the diagnosis and the treatment plan.

## 2018-11-21 RX ORDER — HYDROCODONE BITARTRATE AND ACETAMINOPHEN 10; 325 MG/1; MG/1
1 TABLET ORAL 3 TIMES DAILY PRN
Qty: 90 TABLET | Refills: 0 | Status: SHIPPED | OUTPATIENT
Start: 2018-11-24 | End: 2018-12-28 | Stop reason: SDUPTHER

## 2018-11-21 NOTE — TELEPHONE ENCOUNTER
----- Message from Uzair Kim sent at 11/21/2018  3:53 PM CST -----  Contact: Patient   Rx Refill/Request     Is this a Refill or New Rx:  Yes   Rx Name and Strength:  HYDROcodone-acetaminophen (NORCO)  mg per tablet    Preferred Pharmacy with phone number:     [facility] - [place] - [address]  [address] [facility] [address]  [place] [address]  Phone: [phone] Fax: [phone]

## 2018-12-19 DIAGNOSIS — G89.29 CHRONIC NECK PAIN: ICD-10-CM

## 2018-12-19 DIAGNOSIS — M75.51 SUBDELTOID BURSITIS, RIGHT: ICD-10-CM

## 2018-12-19 DIAGNOSIS — M54.2 CHRONIC NECK PAIN: ICD-10-CM

## 2018-12-19 RX ORDER — TIZANIDINE 4 MG/1
4 TABLET ORAL 3 TIMES DAILY PRN
Qty: 90 TABLET | Refills: 1 | Status: ON HOLD | OUTPATIENT
Start: 2018-12-19 | End: 2018-12-28 | Stop reason: HOSPADM

## 2018-12-19 NOTE — TELEPHONE ENCOUNTER
----- Message from Uzair Kim sent at 12/19/2018 12:04 PM CST -----  Contact: Pharmacy   Rx Refill/Request     Is this a Refill or New Rx:  Yes   Rx Name and Strength:  tiZANidine (ZANAFLEX) 4 MG tablet    Preferred Pharmacy with phone number:     [facility] - [place] - [address]  [address]  [place] [address]  Phone: [phone] Fax: [phone]

## 2018-12-26 PROBLEM — G93.40 ACUTE ENCEPHALOPATHY: Status: ACTIVE | Noted: 2018-12-26

## 2018-12-26 PROBLEM — R00.0 TACHYCARDIA: Status: RESOLVED | Noted: 2018-07-31 | Resolved: 2018-12-26

## 2018-12-26 PROBLEM — I30.0 ACUTE IDIOPATHIC PERICARDITIS: Status: RESOLVED | Noted: 2018-03-06 | Resolved: 2018-12-26

## 2018-12-26 PROBLEM — J44.1 COPD EXACERBATION: Status: ACTIVE | Noted: 2018-12-26

## 2018-12-28 ENCOUNTER — TELEPHONE (OUTPATIENT)
Dept: FAMILY MEDICINE | Facility: CLINIC | Age: 73
End: 2018-12-28

## 2018-12-28 PROBLEM — D72.829 LEUKOCYTOSIS: Status: ACTIVE | Noted: 2018-12-28

## 2018-12-28 NOTE — TELEPHONE ENCOUNTER
----- Message from Gil Hernandez sent at 12/28/2018 11:55 AM CST -----  Contact: Viviana ( [facility]) [phone]  The patient needs to be seen within the next two weeks per Dr. Price. Please contact the patient to discuss further.

## 2018-12-28 NOTE — TELEPHONE ENCOUNTER
----- Message from Kecia Ayala sent at 12/28/2018  3:13 PM CST -----  Contact: self / [phone]  Patient is returning a call from your office. Please advise

## 2018-12-28 NOTE — TELEPHONE ENCOUNTER
----- Message from Nadege Chandler sent at 12/28/2018 11:58 AM CST -----  Contact: [phone]/self  Patient would like to be seen soon for hospital follow up. Please advise.

## 2018-12-28 NOTE — TELEPHONE ENCOUNTER
----- Message from Nadege Chandler sent at 12/28/2018 11:58 AM CST -----  Contact: [phone]/self  Patient would like to be seen soon for hospital follow up. Please advise.

## 2018-12-29 RX ORDER — HYDROCODONE BITARTRATE AND ACETAMINOPHEN 10; 325 MG/1; MG/1
TABLET ORAL
Qty: 90 TABLET | Refills: 0 | Status: SHIPPED | OUTPATIENT
Start: 2018-12-29 | End: 2019-01-24 | Stop reason: SDUPTHER

## 2018-12-31 ENCOUNTER — PATIENT OUTREACH (OUTPATIENT)
Dept: ADMINISTRATIVE | Facility: CLINIC | Age: 73
End: 2018-12-31

## 2018-12-31 NOTE — PATIENT INSTRUCTIONS
Confusion  Confusion is a change in a persons ability to think clearly. There may be trouble recognizing familiar people and places or knowing what day it is. Memory, judgment, and decision-making may also be affected. In severe cases, the person may have limited or no response to being spoken to.  Confusion is usually a sign of an underlying problem. It may occur suddenly. Or it may develop gradually over time. Causes of confusion include brain injury, medicines, alcohol, withdrawal from certain medicines or illegal drugs, and infection. Heart attack and stroke may cause it. Confusion can also be a sign of dementia or a mental illness.  Treatment will depend on the cause of the problem. If the issue is a medicine, stopping the medicine may help. The healthcare provider will perform an evaluation and certain tests may be done. Follow up with the healthcare provider for the results.  Home care  · Be sure someone is with the confused person at all times. He or she should not be left alone or unsupervised.  · Tell the healthcare provider about all medicines that the person takes. These include prescription, over-the-counter, herbs, and supplements.  · Dehydration can increase confusion. Ask the healthcare provider how much fluid the person should be drinking. Offer liquids and ensure that they are taken.  · Keep all medicines in a secure place under the caregivers control. To prevent overdose, a confused person should take medicines only under the supervision of a caregiver.  · To help a person with confusion:  ? Establish a daily routine. Change can be a source of stress for someone with confusion. Make and keep a time schedule for common tasks such as bathing, dressing, taking medicines, meals, going for walks, shopping, naps and bed time.  ? Speak slowly and clearly with a gentle tone of voice. Use short simple words and sentences. Ask one question at a time. Do not interrupt, criticize or argue. Be calm and  supportive. Use friendly facial expressions. Use pointing and touching to help communicate. If there has been loss of long-term memory, do not ask questions about past events. This would only cause frustration for the person.  ? Use lists, signs, family photos, clocks and calendars as memory aids. Label cabinets and drawers. Try to distract, not confront, the patient. When he/she becomes frustrated or upset, redirect his/her attention to eating or some other activity of interest.  ? If this proves to be due to a permanent condition, talk to the healthcare provider or a  about getting a Power of  for healthcare and for financial decisions. It is best to do this while the person can still sign legal documents and make his or her own decisions. Otherwise, a court order will be required.  Follow-up care  Follow up with the person's healthcare provider or as advised for further testing or changes in medical care.  When to seek medical advice  Call the healthcare provider for any of the following:  · Frequent falling  · Refusal to eat or drink  · Violent behavior or behavior too difficult to manage at home  · New hallucinations or delusions  · Increased drowsiness  · Complaints of headache or numbness or weakness of the face, arm or leg  · Nausea or vomiting  · Slurred speech or trouble speaking, walking, or seeing  · Fainting spell, dizziness or seizure  · Unexplained fever over 100.4º F (38.0º C) or as directed by the healthcare provider  Date Last Reviewed: 8/23/2015 © 2000-2018[employer]. [address] [place] [address], [place] [address]. All rights reserved. This information is not intended as a substitute for professional medical care. Always follow your healthcare professional's instructions.

## 2019-01-02 ENCOUNTER — TELEPHONE (OUTPATIENT)
Dept: FAMILY MEDICINE | Facility: CLINIC | Age: 74
End: 2019-01-02

## 2019-01-02 NOTE — TELEPHONE ENCOUNTER
----- Message from Pearl Nash sent at 1/2/2019  8:02 AM CST -----  Contact: [phone]/self  Patient requesting to speak with you concerning rescheduling her hospital follow-up  Please call and advise

## 2019-01-24 RX ORDER — HYDROCODONE BITARTRATE AND ACETAMINOPHEN 10; 325 MG/1; MG/1
1 TABLET ORAL 3 TIMES DAILY PRN
Qty: 90 TABLET | Refills: 0 | Status: SHIPPED | OUTPATIENT
Start: 2019-01-28 | End: 2019-02-25 | Stop reason: SDUPTHER

## 2019-01-24 NOTE — TELEPHONE ENCOUNTER
12/29/18 last Rx refill  11/08/18 last office visit  03/08/19 RTC        ----- Message from Summer Gill sent at 1/24/2019 12:18 PM CST -----  Rx Refill/Request     Is this a Refill or New Rx:  refill  Rx Name and Strength:  HYDROcodone-acetaminophen (NORCO)  mg per tablet  Preferred Pharmacy with phone number:     [id number]  [place] [facility] [address] [facility]  [address] [place] [address]  Phone: [phone] Fax: [phone]    Communication Preference:pt @ [phone]  Additional Information:

## 2019-01-28 ENCOUNTER — OFFICE VISIT (OUTPATIENT)
Dept: FAMILY MEDICINE | Facility: CLINIC | Age: 74
End: 2019-01-28
Payer: MEDICARE

## 2019-01-28 VITALS
BODY MASS INDEX: 25.55 KG/M2 | HEART RATE: 108 BPM | DIASTOLIC BLOOD PRESSURE: 72 MMHG | TEMPERATURE: 97 F | SYSTOLIC BLOOD PRESSURE: 115 MMHG | WEIGHT: 138.88 LBS | HEIGHT: 62 IN

## 2019-01-28 DIAGNOSIS — Z12.31 ENCOUNTER FOR SCREENING MAMMOGRAM FOR BREAST CANCER: ICD-10-CM

## 2019-01-28 DIAGNOSIS — M81.0 OSTEOPOROSIS, UNSPECIFIED OSTEOPOROSIS TYPE, UNSPECIFIED PATHOLOGICAL FRACTURE PRESENCE: ICD-10-CM

## 2019-01-28 DIAGNOSIS — Z86.711 HISTORY OF PULMONARY EMBOLISM: ICD-10-CM

## 2019-01-28 DIAGNOSIS — G47.00 INSOMNIA, UNSPECIFIED TYPE: ICD-10-CM

## 2019-01-28 DIAGNOSIS — I26.99 PULMONARY INFARCT: ICD-10-CM

## 2019-01-28 DIAGNOSIS — R41.82 ALTERED MENTAL STATUS, UNSPECIFIED ALTERED MENTAL STATUS TYPE: Primary | ICD-10-CM

## 2019-01-28 DIAGNOSIS — J44.89 COPD (CHRONIC OBSTRUCTIVE PULMONARY DISEASE) WITH CHRONIC BRONCHITIS: ICD-10-CM

## 2019-01-28 DIAGNOSIS — N18.5 CKD (CHRONIC KIDNEY DISEASE), STAGE V: ICD-10-CM

## 2019-01-28 PROCEDURE — 99999 PR PBB SHADOW E&M-EST. PATIENT-LVL III: CPT | Mod: PBBFAC,HCNC,, | Performed by: FAMILY MEDICINE

## 2019-01-28 PROCEDURE — 99214 PR OFFICE/OUTPT VISIT, EST, LEVL IV, 30-39 MIN: ICD-10-PCS | Mod: HCNC,S$GLB,, | Performed by: FAMILY MEDICINE

## 2019-01-28 PROCEDURE — 99499 UNLISTED E&M SERVICE: CPT | Mod: HCNC,S$GLB,, | Performed by: FAMILY MEDICINE

## 2019-01-28 PROCEDURE — 99999 PR PBB SHADOW E&M-EST. PATIENT-LVL III: ICD-10-PCS | Mod: PBBFAC,HCNC,, | Performed by: FAMILY MEDICINE

## 2019-01-28 PROCEDURE — 99214 OFFICE O/P EST MOD 30 MIN: CPT | Mod: HCNC,S$GLB,, | Performed by: FAMILY MEDICINE

## 2019-01-28 PROCEDURE — 99499 RISK ADDL DX/OHS AUDIT: ICD-10-PCS | Mod: HCNC,S$GLB,, | Performed by: FAMILY MEDICINE

## 2019-01-28 RX ORDER — TRAZODONE HYDROCHLORIDE 50 MG/1
50 TABLET ORAL NIGHTLY
Qty: 30 TABLET | Refills: 11 | Status: ON HOLD | OUTPATIENT
Start: 2019-01-28 | End: 2020-01-15 | Stop reason: CLARIF

## 2019-01-28 NOTE — PATIENT INSTRUCTIONS
You may try to cut your amlodipine 5 mg in half and just take half pill (2.5 mg dose) daily in case this helps with some of your swelling issues. Goal blood pressure should be less than 140/90. Call in 2-3 weeks with home blood pressure readings. If the readings are high, then just go back up to the 5 mg pill     Compression stockings over the counter may help with leg swelling.     Notify if there is any swelling of the leg that is getting worse and not improving throughout the day.   Sleep and Women     Taking a warm bath can help you relax before bed.   Do you have trouble sleeping? Many women do. Some life changes are unique to women, such as pregnancy or menopause. These changes, along with the demands of family and work, can affect your health and your sleep. Talk to your healthcare provider if your sleep problems last more than a few weeks.  What affects your sleep  Many factors can affect how well you sleep. Hormone changes can cause mood swings, insomnia, and other problems. Balancing many roles, such as mother, partner, worker, and caretaker can also take a toll on your sleep. Worries about these competing demands can keep you awake at night. And, of course, with so much to do, who even has time for sleep? But you need to sleep well to be healthy and have energy. The good news is there are steps you can take to sleep better.  Tips for better sleep  Here are some steps you can take to sleep better:  · Keep a regular sleep schedule. Go to bed and get up at the same time each day.  · Exercise regularly. But avoid strenuous exercise 2 hours to 4 hours before bedtime.  · Learn to relax. Try a warm bath, yoga, or meditation. Reading a book or listening to music can help you relax before bedtime.  · Create a comfortable setting for sleep. Make sure the room is quiet, dark, and not too hot or too cold.  · Use your bed only for sleep and sex.  · Avoid or limit caffeine, nicotine, and alcohol.  · Avoid naps after 3  janette Lozano  American Academy of Sleep Medicine  National [employer]  [phone]   Date Last Reviewed: 3/1/2017  © 7468-5467 [employer], [employer]. [address] [place] [address], [place] [address]. All rights reserved. This information is not intended as a substitute for professional medical care. Always follow your healthcare professional's instructions.

## 2019-01-28 NOTE — PROGRESS NOTES
Transitional Care Note  Subjective:       Patient ID: Katie Quevedo is a 73 y.o. female.  Chief Complaint: No chief complaint on file.     Family and/or Caretaker present at visit?  no  Diagnostic tests reviewed/disposition: I have reviewed all completed as well as pending diagnostic tests at the time of discharge.  Disease/illness education: yes  Home health/community services discussion/referrals: Patient does not have home health established from hospital visit.  They do not need home health.  If needed, we will set up home health for the patient.   Establishment or re-establishment of referral orders for community resources: No other necessary community resources.   Discussion with other health care providers: No discussion with other health care providers necessary.   HPI  Review of Systems    Objective:   Physical Exam    Assessment:       No diagnosis found.    Plan:          (Portions of this note were dictated using voice recognition software and may contain dictation related errors in spelling/grammar/syntax not found on text review)     CC:  Hospital follow-up     HPI: 73 y.o. female Last seen August of 2018 in the clinic here for hypotension follow-up initially scheduled January 2nd, rescheduled for today.  Had multiple medical admissions earlier in the year including those for PE with infarct, progression of CKD stage 4-5, COPD.     Presented to ED on 12/26/2018 with altered mental status.   had tried to wake her up and she was not responding well.  Had slurred speech and pinpoint pupils per EMS assessment.  Was given Narcan with improvement of mental status.  Patient was agitated in the ED and also had bradycardia which responded to atropine.  She had some subsequent respiratory distress with wheezing as well.  Her mental status alteration was deemed likely related to polypharmacy.  Patient takes hydrocodone on a regular basis for her low back pain, along with tizanidine and Cymbalta.  Tizanidine  and Cymbalta were discontinued and patient was placed instead on Lyrica to use the in addition to her hydrocodone.  Her COPD was treated with rescue treatment and oral prednisone.  She is on 2 L oxygen therapy chronically , uses intermittently per family.  On Advair 500/50 b.i.d. chronically     She is followed regularly by Nephrology for chronic kidney disease.  Was started back on amlodipine 5 mg after initial discontinuation of her hypertensive treatment after her hypotensive episode for which I had seen her prior..  Blood pressures have been stable.  She is on Prolia infusions for her osteoporosis.  Maintained on Eliquis for her PE history    She complains today of some right leg swelling periodically, does get better.  Left leg does not swell.  Does not hurt at all.  No redness or other trauma to the right leg.  Also occasionally she will notice her hands feel swollen and are more red than usual, just noticed this today.  Do not hurt.    Complains of difficulty sleeping, long-standing.  Has difficulty falling asleep.  Not sure of any specific precipitating factors.  No caffeine.       Past Medical History:   Diagnosis Date    Anemia     Bilateral renal cysts     Cataract     Chronic LBP 7/26/2012    Chronic pain     CKD (chronic kidney disease), stage V     COPD (chronic obstructive pulmonary disease)     Dehydration     Encounter for blood transfusion     HTN (hypertension)     Lumbar spondylosis     Melanoma     of the lip    Metabolic bone disease     Migraines, neuralgic     Osteoporosis     Primary osteoarthritis of both knees     s/p Rt TKA    Seizures     Subdeltoid bursitis, L>R. 9/27/2012    Ulcer     Vitamin D deficiency disease               Past Surgical History:   Procedure Laterality Date    BLADDER SUSPENSION        CATARACT EXTRACTION   11/18/13     left eye    CERVICAL LAMINECTOMY         x3, fusion x1    COLONOSCOPY   2009    HYSTERECTOMY        INSERTION, IOL  PROSTHESIS Left 11/18/2013     [facility]    INSERTION, IOL PROSTHESIS Right 10/28/2013     [facility]    JOINT REPLACEMENT   2001     total right knee     LUMBAR LAMINECTOMY         x 3, fusion x1    OOPHORECTOMY        PHACOEMULSIFICATION, CATARACT Left 11/18/2013     [facility]    PHACOEMULSIFICATION, CATARACT Right 10/28/2013     [facility]               Family History   Problem Relation Age of Onset    Arthritis Mother      Stroke Mother      Hypertension Father      Cancer Father      Cataracts Father      Diabetes Maternal Aunt      Hypertension Maternal Grandfather      Heart disease Maternal Grandfather      Heart attack Maternal Grandfather      Cataracts Sister      Glaucoma Cousin           Social History            Socioeconomic History    Marital status:        Spouse name: Not on file    Number of children: Not on file    Years of education: Not on file    Highest education level: Not on file   Social Needs    Financial resource strain: Not on file    Food insecurity - worry: Not on file    Food insecurity - inability: Not on file    Transportation needs - medical: Not on file    Transportation needs - non-medical: Not on file   Occupational History    Not on file   Tobacco Use    Smoking status: Former Smoker       Types: Cigarettes    Smokeless tobacco: Never Used   Substance and Sexual Activity    Alcohol use: Yes       Comment: Rare    Drug use: No    Sexual activity: No   Other Topics Concern    Are you pregnant or think you may be? Not Asked    Breast-feeding Not Asked   Social History Narrative    Not on file            Lab Results   Component Value Date     WBC 14.30 (H) 12/28/2018     HGB 9.8 (L) 12/28/2018     HCT 30.9 (L) 12/28/2018      12/28/2018     CHOL 183 03/05/2018     TRIG 139 03/05/2018     HDL 52 03/05/2018      ALT 11 12/26/2018     AST 25 12/26/2018      (L) 12/28/2018     K 4.4 12/28/2018      12/28/2018     CREATININE 1.91 (H) 12/28/2018     CALCIUM 8.5 (L) 12/28/2018     ALBUMIN 3.8 12/28/2018     BUN 39 (H) 12/28/2018     CO2 26 12/28/2018     TSH 2.694 01/22/2018     INR 1.1 12/26/2018     HGBA1C 5.2 01/22/2018     LDLCALC 103.2 03/05/2018      (H) 12/28/2018                Creatinine   Date Value Ref Range Status   12/28/2018 1.91 (H) 0.50 - 1.40 mg/dL Final   12/27/2018 2.24 (H) 0.50 - 1.40 mg/dL Final   12/26/2018 2.43 (H) 0.50 - 1.40 mg/dL Final   10/30/2018 2.4 (H) 0.5 - 1.4 mg/dL Final   08/08/2018 2.6 (H) 0.5 - 1.4 mg/dL Final   07/31/2018 3.3 (H) 0.5 - 1.4 mg/dL Final   07/24/2018 3.1 (H) 0.5 - 1.4 mg/dL Final   06/21/2018 2.7 (H) 0.5 - 1.4 mg/dL Final   06/15/2018 2.7 (H) 0.5 - 1.4 mg/dL Final   03/22/2018 2.0 (H) 0.5 - 1.4 mg/dL Final               Hemoglobin   Date Value Ref Range Status   12/28/2018 9.8 (L) 12.0 - 16.0 g/dL Final   12/27/2018 10.3 (L) 12.0 - 16.0 g/dL Final   12/26/2018 9.1 (L) 12.0 - 16.0 g/dL Final   10/30/2018 10.4 (L) 12.0 - 16.0 g/dL Final   08/08/2018 10.4 (L) 12.0 - 16.0 g/dL Final   07/31/2018 9.9 (L) 12.0 - 16.0 g/dL Final   07/24/2018 11.1 (L) 12.0 - 16.0 g/dL Final   06/15/2018 9.8 (L) 12.0 - 16.0 g/dL Final   03/05/2018 10.3 (L) 12.0 - 16.0 g/dL Final   01/22/2018 11.7 (L) 12.0 - 16.0 g/dL Final      Urine drug screen during hospitalization positive for opiates, otherwise negative     Chest x-ray did not show any pulmonary disease     EKG from admit not viewable        ROS:  GENERAL: No fever, chills, fatigability or weight loss.  SKIN: No rashes, no itching.  HEAD: No headaches.  EYES: No visual changes  EARS: No ear pain or changes in hearing.  NOSE: No congestion or rhinorrhea.  MOUTH & THROAT: No hoarseness, change in voice, or sore throat.  NODES: Denies swollen glands.  CHEST: Denies BERTRAND, cyanosis, wheezing, cough and sputum  production.  CARDIOVASCULAR: Denies chest pain, PND, orthopnea.  ABDOMEN: No nausea, vomiting, or changes in bowel function.  URINARY: No flank pain, dysuria or hematuria.  PERIPHERAL VASCULAR: No claudication or cyanosis.  MUSCULOSKELETAL:  Chronic back pain.  NEUROLOGIC: No weakness or numbness.     Vital signs reviewed  PE:   APPEARANCE: Well nourished, well developed, in no acute distress.    HEAD: Normocephalic, atraumatic.  EYES: PERRL. EOMI.   Conjunctivae noninjected.  EARS: TM's intact. Light reflex normal. No retraction or perforation  NOSE: Mucosa pink. Airway clear.  MOUTH & THROAT: No tonsillar enlargement. No pharyngeal erythema or exudate.   NECK: Supple with no cervical lymphadenopathy.   no carotid bruits.  No thyromegaly  CHEST: Good inspiratory effort. Lungs clear to auscultation with no wheezes or crackles.  CARDIOVASCULAR: Normal S1, S2. No rubs, murmurs, or gallops.  ABDOMEN: Bowel sounds normal. Not distended. Soft. No tenderness or masses. No organomegaly.  EXTREMITIES:  Very trace ankle edema on the right side, not on left.  No significant edema of the hands.  There is some mild hyperemia of the palms, nontender to palpation.        IMPRESSION     1. Altered mental status, unspecified altered mental status type    2. CKD (chronic kidney disease), stage V    3. Encounter for screening mammogram for breast cancer    4. Insomnia, unspecified type    5. COPD (chronic obstructive pulmonary disease) with chronic bronchitis    6. History of pulmonary embolism    7. Osteoporosis, unspecified osteoporosis type, unspecified pathological fracture presence    8. Pulmonary infarct              PLAN   Reviewed hospitalization summary.  Etiology of her altered mental status seems to be medication related.  Since has been off Cymbalta and tizanidine.  Currently on Lyrica for pain control.  Feels like it helps fairly well.  Currently on hydrocodone as well.  She can stay off Cymbalta in tizanidine.  No more  episodes noted of mental status alteration per patient    Chronic kidney disease stage 4-5:  Creatinine was slightly improved as trends show above.  Continue follow-up with Nephrology as directed.    COPD:  Stable on home oxygen plus Advair 500/50 b.i.d.     PE, stable on Eliquis, per Cardiology report deemed candidate for lifetime anticoagulation given concern about worsening pulmonary infarcts on prior hospitalization.  No bleeding concerns at this time    Osteoporosis:  Stable on Prolia, next infusion looks to be around March    Occasional peripheral edema concerns.  Discussed different etiologies including her kidney disease, amlodipine that she was recently restarted on secondary to difficult blood pressure management.  Blood pressure looks excellent today.  Discussed that if she wanted to try did take half dose amlodipine (2.5 mg daily) she could try this at home and call report for home blood pressures of the next 1-2 weeks.  If blood pressure is getting to 140/90 or higher, may need to go back to the 5 mg amlodipine.  We did provide reassurance that her peripheral edema symptoms are fairly mild and not causing any significant pain, and such if she needs to stay on her current dose of amlodipine, this is acceptable as well.  She could try compression stockings to help with peripheral edema of the legs.  Notify for persistent swelling that is not improving with positioning or throughout the day para    Insomnia:  Discussed sleep hygiene measures.  Will try trazodone 50 mg at bedtime but notify the office for any concern for mental status changes or other significant side effects.      SCREENINGS  Colonoscopy in 2013 1 polyp (TA)  Recommend repeat in 5 years     Immunizations:  Pneumovax.  2009  tdap    2/7/15  PCV 2016  Zoster:  To be done at pharmacy     Mammogram:1/29/18, rpt ordered     DEXA 2018.  Left femoral neck T-score -2.8.  Right femoral neck T-score -2.8.  Right radius T-score -1.0 (on prolia last  tx was 9.2018)

## 2019-02-08 ENCOUNTER — PES CALL (OUTPATIENT)
Dept: ADMINISTRATIVE | Facility: CLINIC | Age: 74
End: 2019-02-08

## 2019-02-25 RX ORDER — HYDROCODONE BITARTRATE AND ACETAMINOPHEN 10; 325 MG/1; MG/1
1 TABLET ORAL 3 TIMES DAILY PRN
Qty: 90 TABLET | Refills: 0 | Status: SHIPPED | OUTPATIENT
Start: 2019-02-25 | End: 2019-03-27 | Stop reason: SDUPTHER

## 2019-02-25 NOTE — TELEPHONE ENCOUNTER
01/24/1 last Rx refill  11/08/18 last office visit  03/08/19 RTC      ----- Message from Summer Gill sent at 2/25/2019  1:07 PM CST -----  Rx Refill/Request     Is this a Refill or New Rx:  Refill    Rx Name and Strength:  HYDROcodone-acetaminophen (NORCO)  mg per tablet    Preferred Pharmacy with phone number:     [facility]/pharmacy #[id number]  [place] [address]  [address] [place] [address]  Phone: [phone] Fax: [phone]      Communication Preference:pt @ [phone]  Additional Information:

## 2019-03-13 ENCOUNTER — TELEPHONE (OUTPATIENT)
Dept: PHYSICAL MEDICINE AND REHAB | Facility: CLINIC | Age: 74
End: 2019-03-13

## 2019-03-13 NOTE — TELEPHONE ENCOUNTER
Please call office back.          ----- Message from Camilo Devine sent at 3/13/2019  2:38 PM CDT -----  Needs Advice    Reason for call: Pt is asking to speak w/ the doctor regarding pain in back/medication. Pt would give no further information.        Communication Preference: [phone]    Additional Information:

## 2019-03-13 NOTE — TELEPHONE ENCOUNTER
I called the patient.  She said that her low back pain has been getting worse.  She asked if she can take extra tablets of Norco as needed.  I told her I can accommodate her taking 2 tablets intermittently.  She missed her appointment on 03/08/2019 and was given an appointment in July.  I asked her to pace herself on a waiting list for  cancellations.

## 2019-03-25 ENCOUNTER — TELEPHONE (OUTPATIENT)
Dept: PHYSICAL MEDICINE AND REHAB | Facility: CLINIC | Age: 74
End: 2019-03-25

## 2019-03-25 NOTE — TELEPHONE ENCOUNTER
----- Message from Shila Ambriz sent at 3/25/2019 10:55 AM CDT -----  Contact: self @ [phone]  Pt is req a refill for hydrocodone 10/325.    [facility]/pharmacy #[id number] - [place] - [address]             [phone] (Phone)      [phone] (Fax)

## 2019-03-25 NOTE — TELEPHONE ENCOUNTER
----- Message from Shila Ambriz sent at 3/25/2019 10:55 AM CDT -----  Contact: self @ [phone]  Pt is req a refill for hydrocodone 10/325.    [facility]/pharmacy #[id number] - [place] - [address]             [phone] (Phone)      [phone] (Fax)

## 2019-03-27 RX ORDER — HYDROCODONE BITARTRATE AND ACETAMINOPHEN 10; 325 MG/1; MG/1
1 TABLET ORAL 3 TIMES DAILY PRN
Qty: 90 TABLET | Refills: 0 | Status: SHIPPED | OUTPATIENT
Start: 2019-03-28 | End: 2019-04-26 | Stop reason: SDUPTHER

## 2019-03-27 NOTE — TELEPHONE ENCOUNTER
Last visit: 11/08/2018   Canceled/No Show visit: 03/08/2019  Next visit: 07/16/2019 Wait listed  Last refill Norco: 02/25/2019    ----- Message from Summer Gill sent at 3/27/2019  1:53 PM CDT -----  Rx Refill/Request     Is this a Refill or New Rx:  refill  Rx Name and Strength:  HYDROcodone-acetaminophen (NORCO)  mg per tablet  Preferred Pharmacy with phone number:   [facility]/pharmacy #[id number]  [place] [address]  [address] [place] [address]  Phone: [phone] Fax: [phone]    Communication Preference:pt @ [phone]  Additional Information:

## 2019-04-22 ENCOUNTER — LAB VISIT (OUTPATIENT)
Dept: LAB | Facility: HOSPITAL | Age: 74
End: 2019-04-22
Attending: INTERNAL MEDICINE
Payer: MEDICARE

## 2019-04-22 DIAGNOSIS — N18.4 CKD (CHRONIC KIDNEY DISEASE), STAGE IV: ICD-10-CM

## 2019-04-22 LAB
ALBUMIN SERPL BCP-MCNC: 3.6 G/DL (ref 3.5–5.2)
ANION GAP SERPL CALC-SCNC: 10 MMOL/L (ref 8–16)
BUN SERPL-MCNC: 17 MG/DL (ref 8–23)
CALCIUM SERPL-MCNC: 9.8 MG/DL (ref 8.7–10.5)
CHLORIDE SERPL-SCNC: 104 MMOL/L (ref 95–110)
CO2 SERPL-SCNC: 25 MMOL/L (ref 23–29)
CREAT SERPL-MCNC: 2 MG/DL (ref 0.5–1.4)
EST. GFR  (AFRICAN AMERICAN): 28 ML/MIN/1.73 M^2
EST. GFR  (NON AFRICAN AMERICAN): 24 ML/MIN/1.73 M^2
GLUCOSE SERPL-MCNC: 90 MG/DL (ref 70–110)
HGB BLD-MCNC: 11.4 G/DL (ref 12–16)
MAGNESIUM SERPL-MCNC: 2.1 MG/DL (ref 1.6–2.6)
PHOSPHATE SERPL-MCNC: 4.1 MG/DL (ref 2.7–4.5)
POTASSIUM SERPL-SCNC: 4.7 MMOL/L (ref 3.5–5.1)
SODIUM SERPL-SCNC: 139 MMOL/L (ref 136–145)

## 2019-04-22 PROCEDURE — 84100 ASSAY OF PHOSPHORUS: CPT

## 2019-04-22 PROCEDURE — 85018 HEMOGLOBIN: CPT

## 2019-04-22 PROCEDURE — 83735 ASSAY OF MAGNESIUM: CPT

## 2019-04-22 PROCEDURE — 36415 COLL VENOUS BLD VENIPUNCTURE: CPT

## 2019-04-22 PROCEDURE — 82040 ASSAY OF SERUM ALBUMIN: CPT

## 2019-04-22 PROCEDURE — 80048 BASIC METABOLIC PNL TOTAL CA: CPT

## 2019-04-26 RX ORDER — HYDROCODONE BITARTRATE AND ACETAMINOPHEN 10; 325 MG/1; MG/1
1 TABLET ORAL 3 TIMES DAILY PRN
Qty: 90 TABLET | Refills: 0 | Status: SHIPPED | OUTPATIENT
Start: 2019-04-26 | End: 2019-05-24 | Stop reason: SDUPTHER

## 2019-04-26 NOTE — TELEPHONE ENCOUNTER
03/27/18 last Rx refill  11/08/18 last office visit  07/16/19 RTC    ----- Message from Camilo Devine sent at 4/26/2019  8:55 AM CDT -----  Rx Refill/Request     Is this a Refill or New Rx: Refill    Rx Name and Strength: HYDROcodone-acetaminophen (NORCO)  mg per tablet   Preferred Pharmacy with phone number: see below  Communication Preference: [phone]  Additional Information:     [facility]/pharmacy #[id number] - [place] - [address]  [address] [place] [address]  Phone: [phone] Fax: [phone]

## 2019-05-24 RX ORDER — HYDROCODONE BITARTRATE AND ACETAMINOPHEN 10; 325 MG/1; MG/1
1 TABLET ORAL 3 TIMES DAILY PRN
Qty: 90 TABLET | Refills: 0 | Status: SHIPPED | OUTPATIENT
Start: 2019-05-26 | End: 2019-06-25 | Stop reason: SDUPTHER

## 2019-05-24 NOTE — TELEPHONE ENCOUNTER
Last Rx refill-----11/08/18  Last office visit--04/26/19  Next office visit--07/16/19      ----- Message from Camilo Devine sent at 5/24/2019 11:49 AM CDT -----  Rx Refill/Request     Is this a Refill or New Rx: Refill  Rx Name and Strength: HYDROcodone-acetaminophen (NORCO)  mg per tablet   Preferred Pharmacy with phone number: see below  Communication Preference: [phone]  Additional Information:     [facility]/pharmacy #[id number] - [place] - [address]  [address] [place] [address]  Phone: [phone] Fax: [phone]

## 2019-06-19 ENCOUNTER — HOSPITAL ENCOUNTER (INPATIENT)
Facility: HOSPITAL | Age: 74
LOS: 2 days | Discharge: HOME | DRG: 189 | End: 2019-06-21
Attending: EMERGENCY MEDICINE | Admitting: FAMILY MEDICINE
Payer: MEDICARE

## 2019-06-19 DIAGNOSIS — J90 PLEURAL EFFUSION ON LEFT: Primary | ICD-10-CM

## 2019-06-19 DIAGNOSIS — J96.21 ACUTE ON CHRONIC RESPIRATORY FAILURE WITH HYPOXIA: ICD-10-CM

## 2019-06-19 DIAGNOSIS — R06.00 DYSPNEA: ICD-10-CM

## 2019-06-19 LAB
ALBUMIN SERPL BCP-MCNC: 3.4 G/DL (ref 3.5–5.2)
ALP SERPL-CCNC: 81 U/L (ref 55–135)
ALT SERPL W/O P-5'-P-CCNC: 6 U/L (ref 10–44)
ANION GAP SERPL CALC-SCNC: 10 MMOL/L (ref 8–16)
AST SERPL-CCNC: 18 U/L (ref 10–40)
BASOPHILS # BLD AUTO: 0.03 K/UL (ref 0–0.2)
BASOPHILS NFR BLD: 0.4 % (ref 0–1.9)
BILIRUB SERPL-MCNC: 0.5 MG/DL (ref 0.1–1)
BILIRUB UR QL STRIP: NEGATIVE
BNP SERPL-MCNC: 164 PG/ML (ref 0–99)
BUN SERPL-MCNC: 16 MG/DL (ref 8–23)
CALCIUM SERPL-MCNC: 9.9 MG/DL (ref 8.7–10.5)
CHLORIDE SERPL-SCNC: 105 MMOL/L (ref 95–110)
CLARITY UR: CLEAR
CO2 SERPL-SCNC: 23 MMOL/L (ref 23–29)
COLOR UR: YELLOW
CREAT SERPL-MCNC: 1.7 MG/DL (ref 0.5–1.4)
DIFFERENTIAL METHOD: ABNORMAL
EOSINOPHIL # BLD AUTO: 0.5 K/UL (ref 0–0.5)
EOSINOPHIL NFR BLD: 6.6 % (ref 0–8)
ERYTHROCYTE [DISTWIDTH] IN BLOOD BY AUTOMATED COUNT: 14.8 % (ref 11.5–14.5)
EST. GFR  (AFRICAN AMERICAN): 33 ML/MIN/1.73 M^2
EST. GFR  (NON AFRICAN AMERICAN): 29 ML/MIN/1.73 M^2
GLUCOSE SERPL-MCNC: 88 MG/DL (ref 70–110)
GLUCOSE UR QL STRIP: NEGATIVE
HCT VFR BLD AUTO: 32.1 % (ref 37–48.5)
HGB BLD-MCNC: 9.6 G/DL (ref 12–16)
HGB UR QL STRIP: NEGATIVE
KETONES UR QL STRIP: NEGATIVE
LEUKOCYTE ESTERASE UR QL STRIP: NEGATIVE
LYMPHOCYTES # BLD AUTO: 1.2 K/UL (ref 1–4.8)
LYMPHOCYTES NFR BLD: 17 % (ref 18–48)
MAGNESIUM SERPL-MCNC: 1.8 MG/DL (ref 1.6–2.6)
MCH RBC QN AUTO: 27.9 PG (ref 27–31)
MCHC RBC AUTO-ENTMCNC: 29.9 G/DL (ref 32–36)
MCV RBC AUTO: 93 FL (ref 82–98)
MONOCYTES # BLD AUTO: 0.7 K/UL (ref 0.3–1)
MONOCYTES NFR BLD: 10.6 % (ref 4–15)
NEUTROPHILS # BLD AUTO: 4.6 K/UL (ref 1.8–7.7)
NEUTROPHILS NFR BLD: 65.1 % (ref 38–73)
NITRITE UR QL STRIP: NEGATIVE
PH UR STRIP: 6 [PH] (ref 5–8)
PHOSPHATE SERPL-MCNC: 3.1 MG/DL (ref 2.7–4.5)
PLATELET # BLD AUTO: 359 K/UL (ref 150–350)
PMV BLD AUTO: 9.6 FL (ref 9.2–12.9)
POTASSIUM SERPL-SCNC: 4.2 MMOL/L (ref 3.5–5.1)
PROT SERPL-MCNC: 7.4 G/DL (ref 6–8.4)
PROT UR QL STRIP: NEGATIVE
RBC # BLD AUTO: 3.44 M/UL (ref 4–5.4)
SODIUM SERPL-SCNC: 138 MMOL/L (ref 136–145)
SP GR UR STRIP: <=1.005 (ref 1–1.03)
TROPONIN I SERPL DL<=0.01 NG/ML-MCNC: 0.04 NG/ML (ref 0–0.03)
URN SPEC COLLECT METH UR: ABNORMAL
UROBILINOGEN UR STRIP-ACNC: NEGATIVE EU/DL
WBC # BLD AUTO: 6.99 K/UL (ref 3.9–12.7)

## 2019-06-19 PROCEDURE — 83735 ASSAY OF MAGNESIUM: CPT | Mod: HCNC

## 2019-06-19 PROCEDURE — 80053 COMPREHEN METABOLIC PANEL: CPT | Mod: HCNC

## 2019-06-19 PROCEDURE — 99285 EMERGENCY DEPT VISIT HI MDM: CPT | Mod: HCNC,25

## 2019-06-19 PROCEDURE — 12000002 HC ACUTE/MED SURGE SEMI-PRIVATE ROOM: Mod: HCNC

## 2019-06-19 PROCEDURE — 94640 AIRWAY INHALATION TREATMENT: CPT | Mod: HCNC

## 2019-06-19 PROCEDURE — 93005 ELECTROCARDIOGRAM TRACING: CPT | Mod: HCNC

## 2019-06-19 PROCEDURE — 85025 COMPLETE CBC W/AUTO DIFF WBC: CPT | Mod: HCNC

## 2019-06-19 PROCEDURE — 84484 ASSAY OF TROPONIN QUANT: CPT | Mod: HCNC

## 2019-06-19 PROCEDURE — 84100 ASSAY OF PHOSPHORUS: CPT | Mod: HCNC

## 2019-06-19 PROCEDURE — 81003 URINALYSIS AUTO W/O SCOPE: CPT | Mod: HCNC

## 2019-06-19 PROCEDURE — 83880 ASSAY OF NATRIURETIC PEPTIDE: CPT | Mod: HCNC

## 2019-06-19 PROCEDURE — 93010 EKG 12-LEAD: ICD-10-PCS | Mod: HCNC,,, | Performed by: STUDENT IN AN ORGANIZED HEALTH CARE EDUCATION/TRAINING PROGRAM

## 2019-06-19 PROCEDURE — 93010 ELECTROCARDIOGRAM REPORT: CPT | Mod: HCNC,,, | Performed by: STUDENT IN AN ORGANIZED HEALTH CARE EDUCATION/TRAINING PROGRAM

## 2019-06-19 PROCEDURE — 25000242 PHARM REV CODE 250 ALT 637 W/ HCPCS: Mod: HCNC | Performed by: EMERGENCY MEDICINE

## 2019-06-19 RX ORDER — IPRATROPIUM BROMIDE AND ALBUTEROL SULFATE 2.5; .5 MG/3ML; MG/3ML
3 SOLUTION RESPIRATORY (INHALATION)
Status: COMPLETED | OUTPATIENT
Start: 2019-06-19 | End: 2019-06-19

## 2019-06-19 RX ADMIN — IPRATROPIUM BROMIDE AND ALBUTEROL SULFATE 3 ML: .5; 3 SOLUTION RESPIRATORY (INHALATION) at 08:06

## 2019-06-20 PROBLEM — J96.21 ACUTE ON CHRONIC RESPIRATORY FAILURE WITH HYPOXIA: Status: ACTIVE | Noted: 2019-06-19

## 2019-06-20 PROBLEM — I10 ESSENTIAL HYPERTENSION: Chronic | Status: ACTIVE | Noted: 2018-01-22

## 2019-06-20 PROBLEM — J44.1 COPD EXACERBATION: Status: RESOLVED | Noted: 2018-12-26 | Resolved: 2019-06-20

## 2019-06-20 LAB
ALBUMIN FLD-MCNC: 1.9 G/DL
AMYLASE, BODY FLUID: 21 U/L
ANION GAP SERPL CALC-SCNC: 11 MMOL/L (ref 8–16)
APPEARANCE FLD: NORMAL
BASOPHILS # BLD AUTO: 0.02 K/UL (ref 0–0.2)
BASOPHILS NFR BLD: 0.3 % (ref 0–1.9)
BODY FLD TYPE: NORMAL
BODY FLUID SOURCE AMYLASE: NORMAL
BODY FLUID SOURCE, LDH: NORMAL
BUN SERPL-MCNC: 15 MG/DL (ref 8–23)
CALCIUM SERPL-MCNC: 9.6 MG/DL (ref 8.7–10.5)
CHLORIDE SERPL-SCNC: 105 MMOL/L (ref 95–110)
CO2 SERPL-SCNC: 23 MMOL/L (ref 23–29)
COLOR FLD: YELLOW
CREAT SERPL-MCNC: 1.7 MG/DL (ref 0.5–1.4)
DIFFERENTIAL METHOD: ABNORMAL
EOSINOPHIL # BLD AUTO: 0.6 K/UL (ref 0–0.5)
EOSINOPHIL NFR BLD: 9.1 % (ref 0–8)
ERYTHROCYTE [DISTWIDTH] IN BLOOD BY AUTOMATED COUNT: 14.9 % (ref 11.5–14.5)
EST. GFR  (AFRICAN AMERICAN): 33 ML/MIN/1.73 M^2
EST. GFR  (NON AFRICAN AMERICAN): 29 ML/MIN/1.73 M^2
GLUCOSE FLD-MCNC: 95 MG/DL
GLUCOSE SERPL-MCNC: 81 MG/DL (ref 70–110)
GLUCOSE SERPL-MCNC: 98 MG/DL (ref 70–110)
HCT VFR BLD AUTO: 30.8 % (ref 37–48.5)
HGB BLD-MCNC: 9.4 G/DL (ref 12–16)
LDH FLD L TO P-CCNC: 116 U/L
LDH SERPL L TO P-CCNC: 245 U/L (ref 110–260)
LYMPHOCYTES # BLD AUTO: 1.3 K/UL (ref 1–4.8)
LYMPHOCYTES NFR BLD: 21 % (ref 18–48)
LYMPHOCYTES NFR FLD MANUAL: 90 %
MCH RBC QN AUTO: 28.7 PG (ref 27–31)
MCHC RBC AUTO-ENTMCNC: 30.5 G/DL (ref 32–36)
MCV RBC AUTO: 94 FL (ref 82–98)
MESOTHL CELL NFR FLD MANUAL: 3 %
MONOCYTES # BLD AUTO: 1 K/UL (ref 0.3–1)
MONOCYTES NFR BLD: 15.5 % (ref 4–15)
MONOS+MACROS NFR FLD MANUAL: 7 %
NEUTROPHILS # BLD AUTO: 3.3 K/UL (ref 1.8–7.7)
NEUTROPHILS NFR BLD: 53.9 % (ref 38–73)
PLATELET # BLD AUTO: 324 K/UL (ref 150–350)
PMV BLD AUTO: 9.5 FL (ref 9.2–12.9)
POTASSIUM SERPL-SCNC: 4.1 MMOL/L (ref 3.5–5.1)
PROT FLD-MCNC: 3 G/DL
PROT SERPL-MCNC: 7.4 G/DL (ref 6–8.4)
RBC # BLD AUTO: 3.28 M/UL (ref 4–5.4)
SODIUM SERPL-SCNC: 139 MMOL/L (ref 136–145)
SPECIMEN SOURCE: NORMAL
TROPONIN I SERPL DL<=0.01 NG/ML-MCNC: 0.04 NG/ML (ref 0–0.03)
WBC # BLD AUTO: 6.18 K/UL (ref 3.9–12.7)
WBC # FLD: 938 /CU MM

## 2019-06-20 PROCEDURE — 84484 ASSAY OF TROPONIN QUANT: CPT | Mod: HCNC

## 2019-06-20 PROCEDURE — 88112 CYTOPATH CELL ENHANCE TECH: CPT | Mod: 26,HCNC,, | Performed by: PATHOLOGY

## 2019-06-20 PROCEDURE — 80048 BASIC METABOLIC PNL TOTAL CA: CPT | Mod: HCNC

## 2019-06-20 PROCEDURE — 63600175 PHARM REV CODE 636 W HCPCS: Mod: HCNC | Performed by: PHYSICIAN ASSISTANT

## 2019-06-20 PROCEDURE — 94640 AIRWAY INHALATION TREATMENT: CPT | Mod: HCNC

## 2019-06-20 PROCEDURE — 83615 LACTATE (LD) (LDH) ENZYME: CPT | Mod: 91,HCNC

## 2019-06-20 PROCEDURE — 84155 ASSAY OF PROTEIN SERUM: CPT | Mod: HCNC

## 2019-06-20 PROCEDURE — 25000003 PHARM REV CODE 250: Mod: HCNC | Performed by: FAMILY MEDICINE

## 2019-06-20 PROCEDURE — 82945 GLUCOSE OTHER FLUID: CPT | Mod: HCNC

## 2019-06-20 PROCEDURE — 82150 ASSAY OF AMYLASE: CPT | Mod: HCNC

## 2019-06-20 PROCEDURE — 63600175 PHARM REV CODE 636 W HCPCS: Mod: HCNC | Performed by: NURSE PRACTITIONER

## 2019-06-20 PROCEDURE — 88305 TISSUE EXAM BY PATHOLOGIST: CPT | Mod: HCNC | Performed by: PATHOLOGY

## 2019-06-20 PROCEDURE — 89051 BODY FLUID CELL COUNT: CPT | Mod: HCNC

## 2019-06-20 PROCEDURE — 88112 CYTOLOGY SPECIMEN- PULMONARY MEDICAL CYTOLOGY: ICD-10-PCS | Mod: 26,HCNC,, | Performed by: PATHOLOGY

## 2019-06-20 PROCEDURE — 84157 ASSAY OF PROTEIN OTHER: CPT | Mod: HCNC

## 2019-06-20 PROCEDURE — 94761 N-INVAS EAR/PLS OXIMETRY MLT: CPT | Mod: HCNC

## 2019-06-20 PROCEDURE — 82947 ASSAY GLUCOSE BLOOD QUANT: CPT | Mod: HCNC

## 2019-06-20 PROCEDURE — 25000242 PHARM REV CODE 250 ALT 637 W/ HCPCS: Mod: HCNC | Performed by: FAMILY MEDICINE

## 2019-06-20 PROCEDURE — 83615 LACTATE (LD) (LDH) ENZYME: CPT | Mod: HCNC

## 2019-06-20 PROCEDURE — 27000221 HC OXYGEN, UP TO 24 HOURS: Mod: HCNC

## 2019-06-20 PROCEDURE — 25000003 PHARM REV CODE 250: Mod: HCNC | Performed by: NURSE PRACTITIONER

## 2019-06-20 PROCEDURE — 11000001 HC ACUTE MED/SURG PRIVATE ROOM: Mod: HCNC

## 2019-06-20 PROCEDURE — 85025 COMPLETE CBC W/AUTO DIFF WBC: CPT | Mod: HCNC

## 2019-06-20 PROCEDURE — 36415 COLL VENOUS BLD VENIPUNCTURE: CPT | Mod: HCNC

## 2019-06-20 PROCEDURE — 88305 TISSUE EXAM BY PATHOLOGIST: CPT | Mod: 26,HCNC,, | Performed by: PATHOLOGY

## 2019-06-20 PROCEDURE — 88305 CYTOLOGY SPECIMEN- PULMONARY MEDICAL CYTOLOGY: ICD-10-PCS | Mod: 26,HCNC,, | Performed by: PATHOLOGY

## 2019-06-20 PROCEDURE — 82042 OTHER SOURCE ALBUMIN QUAN EA: CPT | Mod: HCNC

## 2019-06-20 PROCEDURE — 84311 SPECTROPHOTOMETRY: CPT | Mod: HCNC

## 2019-06-20 RX ORDER — IPRATROPIUM BROMIDE AND ALBUTEROL SULFATE 2.5; .5 MG/3ML; MG/3ML
3 SOLUTION RESPIRATORY (INHALATION) 3 TIMES DAILY PRN
Status: DISCONTINUED | OUTPATIENT
Start: 2019-06-20 | End: 2019-06-20

## 2019-06-20 RX ORDER — SODIUM CHLORIDE 0.9 % (FLUSH) 0.9 %
10 SYRINGE (ML) INJECTION
Status: DISCONTINUED | OUTPATIENT
Start: 2019-06-20 | End: 2019-06-21 | Stop reason: HOSPADM

## 2019-06-20 RX ORDER — LEVOFLOXACIN 5 MG/ML
750 INJECTION, SOLUTION INTRAVENOUS
Status: DISCONTINUED | OUTPATIENT
Start: 2019-06-20 | End: 2019-06-20

## 2019-06-20 RX ORDER — FERROUS SULFATE 325(65) MG
325 TABLET, DELAYED RELEASE (ENTERIC COATED) ORAL 2 TIMES DAILY
Status: DISCONTINUED | OUTPATIENT
Start: 2019-06-20 | End: 2019-06-21 | Stop reason: HOSPADM

## 2019-06-20 RX ORDER — AMLODIPINE BESYLATE 5 MG/1
10 TABLET ORAL DAILY
Status: DISCONTINUED | OUTPATIENT
Start: 2019-06-20 | End: 2019-06-21 | Stop reason: HOSPADM

## 2019-06-20 RX ORDER — FUROSEMIDE 10 MG/ML
40 INJECTION INTRAMUSCULAR; INTRAVENOUS ONCE
Status: COMPLETED | OUTPATIENT
Start: 2019-06-20 | End: 2019-06-20

## 2019-06-20 RX ORDER — ONDANSETRON 2 MG/ML
4 INJECTION INTRAMUSCULAR; INTRAVENOUS EVERY 8 HOURS PRN
Status: DISCONTINUED | OUTPATIENT
Start: 2019-06-20 | End: 2019-06-21 | Stop reason: HOSPADM

## 2019-06-20 RX ORDER — FUROSEMIDE 10 MG/ML
40 INJECTION INTRAMUSCULAR; INTRAVENOUS ONCE
Status: DISCONTINUED | OUTPATIENT
Start: 2019-06-20 | End: 2019-06-20

## 2019-06-20 RX ORDER — IPRATROPIUM BROMIDE AND ALBUTEROL SULFATE 2.5; .5 MG/3ML; MG/3ML
3 SOLUTION RESPIRATORY (INHALATION) EVERY 4 HOURS PRN
Status: DISCONTINUED | OUTPATIENT
Start: 2019-06-20 | End: 2019-06-21 | Stop reason: HOSPADM

## 2019-06-20 RX ORDER — FUROSEMIDE 10 MG/ML
40 INJECTION INTRAMUSCULAR; INTRAVENOUS 2 TIMES DAILY
Status: DISCONTINUED | OUTPATIENT
Start: 2019-06-20 | End: 2019-06-20

## 2019-06-20 RX ORDER — ACETAMINOPHEN 325 MG/1
650 TABLET ORAL EVERY 4 HOURS PRN
Status: DISCONTINUED | OUTPATIENT
Start: 2019-06-20 | End: 2019-06-21 | Stop reason: HOSPADM

## 2019-06-20 RX ORDER — ACETAMINOPHEN 325 MG/1
650 TABLET ORAL EVERY 8 HOURS PRN
Status: DISCONTINUED | OUTPATIENT
Start: 2019-06-20 | End: 2019-06-21 | Stop reason: HOSPADM

## 2019-06-20 RX ORDER — RAMELTEON 8 MG/1
8 TABLET ORAL NIGHTLY PRN
Status: DISCONTINUED | OUTPATIENT
Start: 2019-06-20 | End: 2019-06-21 | Stop reason: HOSPADM

## 2019-06-20 RX ORDER — DIAZEPAM 2 MG/1
2 TABLET ORAL EVERY 12 HOURS PRN
Status: DISCONTINUED | OUTPATIENT
Start: 2019-06-20 | End: 2019-06-21 | Stop reason: HOSPADM

## 2019-06-20 RX ORDER — LIDOCAINE HYDROCHLORIDE 10 MG/ML
10 INJECTION, SOLUTION EPIDURAL; INFILTRATION; INTRACAUDAL; PERINEURAL ONCE
Status: COMPLETED | OUTPATIENT
Start: 2019-06-20 | End: 2019-06-20

## 2019-06-20 RX ADMIN — FERROUS SULFATE TAB EC 325 MG (65 MG FE EQUIVALENT) 325 MG: 325 (65 FE) TABLET DELAYED RESPONSE at 12:06

## 2019-06-20 RX ADMIN — AMLODIPINE BESYLATE 10 MG: 5 TABLET ORAL at 08:06

## 2019-06-20 RX ADMIN — IPRATROPIUM BROMIDE AND ALBUTEROL SULFATE 3 ML: .5; 3 SOLUTION RESPIRATORY (INHALATION) at 11:06

## 2019-06-20 RX ADMIN — FUROSEMIDE 40 MG: 10 INJECTION, SOLUTION INTRAVENOUS at 11:06

## 2019-06-20 RX ADMIN — FERROUS SULFATE TAB EC 325 MG (65 MG FE EQUIVALENT) 325 MG: 325 (65 FE) TABLET DELAYED RESPONSE at 08:06

## 2019-06-20 RX ADMIN — ACETAMINOPHEN 650 MG: 325 TABLET ORAL at 12:06

## 2019-06-20 RX ADMIN — RAMELTEON 8 MG: 8 TABLET, FILM COATED ORAL at 12:06

## 2019-06-20 RX ADMIN — APIXABAN 2.5 MG: 2.5 TABLET, FILM COATED ORAL at 12:06

## 2019-06-20 RX ADMIN — ACETAMINOPHEN 650 MG: 325 TABLET ORAL at 08:06

## 2019-06-20 RX ADMIN — IPRATROPIUM BROMIDE AND ALBUTEROL SULFATE 3 ML: .5; 3 SOLUTION RESPIRATORY (INHALATION) at 09:06

## 2019-06-20 RX ADMIN — LIDOCAINE HYDROCHLORIDE 100 MG: 10 INJECTION, SOLUTION EPIDURAL; INFILTRATION; INTRACAUDAL; PERINEURAL at 01:06

## 2019-06-20 RX ADMIN — IPRATROPIUM BROMIDE AND ALBUTEROL SULFATE 3 ML: .5; 3 SOLUTION RESPIRATORY (INHALATION) at 04:06

## 2019-06-20 RX ADMIN — FUROSEMIDE 40 MG: 10 INJECTION, SOLUTION INTRAVENOUS at 12:06

## 2019-06-20 NOTE — NURSING
Admitted 74 year old female patient from ED, brought in to the unit around 0000H via stretcher. Conscious , coherent to time, place date, and situation, with saline lock on the left AC gauge 20 , flushed patent, with clean and dry dressing.  Patient case of shortness of breathe related to left sided pleural effusion. Patient has subjective complaint of low back pain 5/10, tylenol po given noted mild relief.  Afebrile, Blood pressure on the high side. Patient tachypnic and dyspneic upon movement and upon conversation.Advised to be on bed rest for the mean time. NP Chairs  informed regarding this admission. Furosemide 40 mg IV given. Telemetry Sinus tachycardic (105-110's) Oxygen saturation 99% on 2 lpm via NC. Advised patient to call for any assistance. Safety fall precaution measures noted. Bed alarm ON. External catheter applied. Advised patient to inform the nurse for any chest pain, or any difficulty in breathing. Kept NPO post midnight as ordered. Call bell with in reach. Will continue to monitor patient.

## 2019-06-20 NOTE — PLAN OF CARE
"Patient lost IV access. Went to place IV prior to pulmonary entering room.  Dr. Interiano stated " Patient does not need IV at this time, if anything emergent happens I will do a IO ".   "

## 2019-06-20 NOTE — PROGRESS NOTES
[facility] Hospital Medicine  Progress Note    Patient Name: Katie Quevedo  MRN: [id number]  Patient Class: IP- Inpatient   Admission Date: 6/19/2019  Length of Stay: 1 days  Attending Physician: Jennifer Bowles*  Primary Care Provider: [place] Verduzco MD        Subjective:     Principal Problem:Acute on chronic respiratory failure with hypoxia      HPI:  Katie Quevedo is a 74-year-old female with a past medical history of COPD (on home O2), Pulmonary Embolus with infarction in 12/17 on Eliquis, CKD stage 5, osteoporosis, chronic pain, Anemia, Hypertension, Bilateral renal cysts, Cataract, Chronic LBP and Migraines. She lives in [place] with her . Her PCP is Dr. Kingston Verduzco. Her Nephrologist is Dr. Aura Diggs.     She presented to [facility] ED 6/19/2019 with a chief complaint of shortness of breath that has increased over the past 4 days. Associated symptoms include generalized edema, with pronounced swelling to face, hands and feet. Denies fever, chills, cough/cold symptoms, SOB, chest pain, N/V/D, abdominal pain, numbness/tingling, weakness or any other concerning symptoms. ED workup revealed Hgb (9.6), Hct (32.1), Cr (1.7), Albumin (3.4), BNP (164), Troponin (0.035), CXR revealed Left basilar opacity suggesting pleural effusion with probable underlying atelectasis/infiltrate, with underlying mass such as pulmonary or pleural based neoplasm not excluded. CT thorax confirmed Moderate left-sided pleural effusion resulting in volume loss partial collapse of the left lower lobe and Moderate pericardial effusion. Admitted to Hospital Medicine for further evaluation and treatment.    Overview/Hospital Course:  Pulmonology was consulted, and felt that effusions were likely 2/2 malignancy. Pt had thoracentesis done on 6/20 and 1.2 L of clear fluid were removed. Labs were sent. Respiratory status improved.     Interval History: Pt resting in bed after procedure. Discussed with .  He said that she is appearing better.     Review of Systems   Unable to perform ROS: Other     Objective:     Vital Signs (Most Recent):  Temp: 98.2 °F (36.8 °C) (06/20/19 1139)  Pulse: (!) 116 (06/20/19 1155)  Resp: 14 (06/20/19 1155)  BP: 131/70 (06/20/19 1139)  SpO2: 95 % (06/20/19 1155) Vital Signs (24h Range):  Temp:  [96.2 °F (35.7 °C)-98.2 °F (36.8 °C)] 98.2 °F (36.8 °C)  Pulse:  [] 116  Resp:  [13-26] 14  SpO2:  [92 %-99 %] 95 %  BP: (109-160)/(62-80) 131/70     Weight: 68.9 kg (151 lb 14.4 oz)  Body mass index is 29.67 kg/m².    Intake/Output Summary (Last 24 hours) at 6/20/2019 1517  Last data filed at 6/20/2019 1426  Gross per 24 hour   Intake 50 ml   Output 1450 ml   Net -1400 ml      Physical Exam   Constitutional: No distress.   HENT:   Head: Normocephalic and atraumatic.   Pulmonary/Chest: Effort normal. No respiratory distress.   Musculoskeletal: She exhibits no edema.   Skin: Skin is warm and dry. She is not diaphoretic.   Nursing note and vitals reviewed.      Significant Labs:   BMP:   Recent Labs   Lab 06/19/19 1947 06/20/19 0515 06/20/19  1342   GLU 88 81 98    139  --    K 4.2 4.1  --     105  --    CO2 23 23  --    BUN 16 15  --    CREATININE 1.7* 1.7*  --    CALCIUM 9.9 9.6  --    MG 1.8  --   --      CBC:   Recent Labs   Lab 06/19/19 1947 06/20/19 0515   WBC 6.99 6.18   HGB 9.6* 9.4*   HCT 32.1* 30.8*   * 324       Significant Imaging: I have reviewed all pertinent imaging results/findings within the past 24 hours.      Assessment/Plan:      * Acute on chronic respiratory failure with hypoxia  COPD  Left pleural effusions  Pericardial effusion  Admission CXR revealing L pleural effusion, CT with pericardial effusion and some possible tamponade on echo.  -Continue home 2L O2 per NC  -Pulmonology consult  -S/p thoracentesis, follow labs  -Will monitor overnight and then likely d/c in the am    Chronic neck pain  Chronic. Continue home meds    CKD (chronic kidney  disease), stage V  Cr (1.7) on admit  -Strict I&Os  -Renally dose all meds  -Avoid Nephrotoxic agents    History of pulmonary embolism  Pt with high probability VQ scan in 3/5/18.   -Will d/c apixaban per pulmonology    Essential hypertension  -160.   -Norvasc 10 mg po daily. Continue     Iron deficiency anemia  Hgb (9.6), Hct (32.1) on admit  -Ferrous sulfate  mg po BID    Osteoporosis  Continue home meds    Vitamin deficiency  Continue home meds.      VTE Risk Mitigation (From admission, onward)        Ordered     IP VTE HIGH RISK PATIENT  Once      06/20/19 0020     Place sequential compression device  Until discontinued      06/20/19 0020                VIDA FoyC  Department of Hospital Medicine   [facility]

## 2019-06-20 NOTE — ED NOTES
PT reports improvement in breathing/sob since breathing tx; pt updatated on poc and v/u. States she does not have an urge to void at this time. Comfort and safety measures maintained. Call light within reach,  at bedside

## 2019-06-20 NOTE — H&P
[facility] Hospital Medicine  History & Physical    Patient Name: Katie Quevedo  MRN: [id number]  Admission Date: 6/19/2019  Attending Physician: Jennifer Bowles*   Primary Care Provider: Kingston Verduzco MD         Patient information was obtained from patient, past medical records and ER records.     Subjective:     Principal Problem:Dyspnea    Chief Complaint:   Chief Complaint   Patient presents with    Shortness of Breath     Pt. c/o shortness of breath that has increased over the past 4 days. Pt. c/o increased swelling to the face, hands and feet over the past month. Pt. has stage 4 kidney disease and is not on dialysis at this time.        HPI: Katie Quevedo is a 74-year-old female with a past medical history of COPD (on home O2), Pulmonary Embolus with infarction in 12/17 on Eliquis, CKD stage 5, osteoporosis, chronic pain, Anemia, Hypertension, Bilateral renal cysts, Cataract, Chronic LBP and Migraines. She lives in [place] with her . Her PCP is Dr. Kingston Verdzuco. Her Nephrologist is Dr. Aura Diggs.     She presented to [facility] ED 6/19/2019 with a chief complaint of shortness of breath that has increased over the past 4 days. Associated symptoms include generalized edema, with pronounced swelling to face, hands and feet. Denies fever, chills, cough/cold symptoms, SOB, chest pain, N/V/D, abdominal pain, numbness/tingling, weakness or any other concerning symptoms. ED workup revealed Hgb (9.6), Hct (32.1), Cr (1.7), Albumin (3.4), BNP (164), Troponin (0.035), CXR revealed Left basilar opacity suggesting pleural effusion with probable underlying atelectasis/infiltrate, with underlying mass such as pulmonary or pleural based neoplasm not excluded. CT thorax confirmed Moderate left-sided pleural effusion resulting in volume loss partial collapse of the left lower lobe and Moderate pericardial effusion. Admitted to Hospital Medicine for further evaluation and treatment.    Past  Medical History:   Diagnosis Date    Anemia     Bilateral renal cysts     Cataract     Chronic LBP 7/26/2012    Chronic pain     CKD (chronic kidney disease), stage V     COPD (chronic obstructive pulmonary disease)     Dehydration     Encounter for blood transfusion     HTN (hypertension)     Lumbar spondylosis     Melanoma     of the lip    Metabolic bone disease     Migraines, neuralgic     Osteoporosis     Primary osteoarthritis of both knees     s/p Rt TKA    Pulmonary embolism with infarction     Seizures     Subdeltoid bursitis, L>R. 9/27/2012    Ulcer     Vitamin D deficiency disease        Past Surgical History:   Procedure Laterality Date    BLADDER SUSPENSION      CATARACT EXTRACTION  11/18/13    left eye    CERVICAL LAMINECTOMY      x3, fusion x1    COLONOSCOPY  2009    HYSTERECTOMY      INSERTION, IOL PROSTHESIS Left 11/18/2013    Performed by Marcy Shah [facility] at [facility], [place], operated by [facility] OR    INSERTION, IOL PROSTHESIS Right 10/28/2013    Performed by Marcy Shah MD at [facility], [place], operated by [facility] OR    JOINT REPLACEMENT  2001    total right knee     LUMBAR LAMINECTOMY      x 3, fusion x1    OOPHORECTOMY      PHACOEMULSIFICATION, CATARACT Left 11/18/2013    Performed by Marcy Shah MD at [facility], [place], operated by [facility] OR    PHACOEMULSIFICATION, CATARACT Right 10/28/2013    Performed by Marcy Shah MD at [facility], [place], operated by [facility] OR       Review of patient's allergies indicates:   Allergen Reactions    Aspirin      Other reaction(s): hx of ulcers    Tetracycline Swelling     Other reaction(s): Swelling    Penicillins Rash     Other reaction(s): Hives  Other reaction(s): Rash  Other reaction(s): Rash  Other reaction(s): Hives       No current facility-administered medications on file prior to encounter.      Current Outpatient Medications on File Prior to Encounter   Medication Sig    albuterol-ipratropium (DUO-NEB) 2.5 mg-0.5 mg/3 mL nebulizer solution Take 3 mLs by nebulization 3 (three) times daily as needed for Wheezing or Shortness of Breath.  Rescue    amLODIPine (NORVASC) 10 MG tablet Take 10 mg by mouth once daily.    apixaban 2.5 mg Tab Take 1 tablet (2.5 mg total) by mouth 2 (two) times daily.    calcium-vitamin D3 (CALCIUM 500 + D) 500 mg(1,250mg) -200 unit per tablet Take 1 tablet by mouth 2 (two) times daily with meals.    diazePAM (VALIUM) 2 MG tablet TAKE 1 TABLET BY MOUTH DAILY & TAKE 1 TABLET AS NEEDED    ergocalciferol (ERGOCALCIFEROL) 50,000 unit Cap Take 1 capsule (50,000 Units total) by mouth every 7 days. 68348 units weekly - 12 weeks, then monthly.    ferrous sulfate 325 (65 FE) MG EC tablet Take 1 tablet (325 mg total) by mouth 2 (two) times daily.    FLUAD 5088-7751, 65 YR UP,,PF, 45 mcg (15 mcg x 3)/0.5 mL Syrg TO BE ADMINISTERED BY PHARMACIST    fluticasone-salmeterol 500-50 mcg/dose (ADVAIR DISKUS) 500-50 mcg/dose DsDv diskus inhaler Inhale 1 puff into the lungs 2 (two) times daily. Controller    HYDROcodone-acetaminophen (NORCO)  mg per tablet Take 1 tablet by mouth 3 (three) times daily as needed.    ipratropium-albuterol (COMBIVENT)  mcg/actuation inhaler Inhale 1 puff into the lungs every 4 (four) hours as needed for Wheezing. Rescue    sodium bicarbonate 650 MG tablet Take 1 tablet (650 mg total) by mouth 3 (three) times daily.    traZODone (DESYREL) 50 MG tablet Take 1 tablet (50 mg total) by mouth every evening.    pregabalin (LYRICA) 50 MG capsule Take 1 capsule (50 mg total) by mouth 2 (two) times daily.     Family History     Problem Relation (Age of Onset)    Arthritis Mother    Cancer Father    Cataracts Father, Sister    Diabetes Maternal Aunt    Glaucoma Cousin    Heart attack Maternal Grandfather    Heart disease Maternal Grandfather    Hypertension Father, Maternal Grandfather    Stroke Mother        Tobacco Use    Smoking status: Former Smoker     Types: Cigarettes    Smokeless tobacco: Never Used   Substance and Sexual Activity    Alcohol use: Yes     Comment: Rare    Drug use: No     Sexual activity: Never     Review of Systems   Constitutional: Positive for activity change and fatigue. Negative for fever.   HENT: Positive for facial swelling. Negative for congestion, postnasal drip and sinus pain.    Eyes: Negative for visual disturbance.   Respiratory: Positive for shortness of breath and wheezing.    Cardiovascular: Positive for leg swelling. Negative for chest pain and palpitations.   Gastrointestinal: Negative for abdominal distention, abdominal pain, diarrhea, nausea and vomiting.   Endocrine: Negative for polydipsia and polyuria.   Genitourinary: Negative for flank pain, hematuria and pelvic pain.   Musculoskeletal: Negative for back pain and joint swelling.   Skin: Negative for color change and rash.   Allergic/Immunologic: Negative for food allergies.   Neurological: Negative for tremors, seizures and headaches.   Psychiatric/Behavioral: Negative for agitation.     Objective:     Vital Signs (Most Recent):  Temp: 97.6 °F (36.4 °C) (06/19/19 2114)  Pulse: 103 (06/19/19 2114)  Resp: (!) 26 (06/19/19 2113)  BP: 129/64 (06/19/19 2114)  SpO2: (!) 94 % (06/19/19 2114) Vital Signs (24h Range):  Temp:  [97.6 °F (36.4 °C)-97.8 °F (36.6 °C)] 97.6 °F (36.4 °C)  Pulse:  [] 103  Resp:  [13-26] 26  SpO2:  [94 %-98 %] 94 %  BP: (117-129)/(62-64) 129/64     Weight: 63.5 kg (140 lb)  Body mass index is 25.4 kg/m².    Physical Exam   Constitutional: She appears well-developed and well-nourished. No distress.   HENT:   Head: Normocephalic and atraumatic.   Eyes: Pupils are equal, round, and reactive to light.   Neck: Normal range of motion. No JVD present.   Cardiovascular: Regular rhythm.   Tachycardia   Pulmonary/Chest: Effort normal. She has wheezes.   Abdominal: Soft. Bowel sounds are normal. She exhibits no distension.   Musculoskeletal: Normal range of motion. She exhibits edema.   Neurological: She is alert.   Skin: Skin is warm and dry. Capillary refill takes less than 2 seconds.    Psychiatric: She has a normal mood and affect.         CRANIAL NERVES     CN III, IV, VI   Pupils are equal, round, and reactive to light.       Significant Labs:   Bilirubin:   Recent Labs   Lab 06/19/19 1947   BILITOT 0.5     BMP:   Recent Labs   Lab 06/19/19 1947   GLU 88      K 4.2      CO2 23   BUN 16   CREATININE 1.7*   CALCIUM 9.9   MG 1.8     CBC:   Recent Labs   Lab 06/19/19 1947   WBC 6.99   HGB 9.6*   HCT 32.1*   *     CMP:   Recent Labs   Lab 06/19/19 1947      K 4.2      CO2 23   GLU 88   BUN 16   CREATININE 1.7*   CALCIUM 9.9   PROT 7.4   ALBUMIN 3.4*   BILITOT 0.5   ALKPHOS 81   AST 18   ALT 6*   ANIONGAP 10   EGFRNONAA 29*     Cardiac Markers:   Recent Labs   Lab 06/19/19 1947   *     Magnesium:   Recent Labs   Lab 06/19/19 1947   MG 1.8     Troponin:   Recent Labs   Lab 06/19/19 1947   TROPONINI 0.035*     Urine Studies:   Recent Labs   Lab 06/19/19 2205   COLORU Yellow   APPEARANCEUA Clear   PHUR 6.0   SPECGRAV <=1.005*   PROTEINUA Negative   GLUCUA Negative   KETONESU Negative   BILIRUBINUA Negative   OCCULTUA Negative   NITRITE Negative   UROBILINOGEN Negative   LEUKOCYTESUR Negative     All pertinent labs within the past 24 hours have been reviewed.    Significant Imaging: I have reviewed all pertinent imaging results/findings within the past 24 hours.     Imaging Results          CT Thorax Without Contrast (Final result)  Result time 06/19/19 22:16:04    Final result by Naila Crocker MD (06/19/19 22:16:04)                 Impression:      1. Moderate left-sided pleural effusion resulting in volume loss partial collapse of the left lower lobe.  2. Moderate pericardial effusion.      Electronically signed by: Naila Crocker MD  Date:    06/19/2019  Time:    22:16             Narrative:    EXAMINATION:  CT CHEST WITHOUT CONTRAST    CLINICAL HISTORY:  Chest pain or SOB, pleurisy or effusion suspected;    TECHNIQUE:  Low dose axial images, sagittal  and coronal reformations were obtained from the thoracic inlet to the lung bases. Contrast was not administered.    COMPARISON:  None.    FINDINGS:  Structures at the base of the neck are unremarkable.  Aorta is non-aneurysmal.  Heart is normal in size with moderate pericardial effusion seen.  Multiple prominent mediastinal lymph nodes are seen which do not meet CT criteria for enlargement.  The esophagus is unremarkable along its course.    Moderate volume left pleural effusion is seen resulting and volume loss and partial collapse of the left lower lobe.  No significant pleural effusion seen on the right.  Right lung is well expanded with minimal right basilar atelectasis seen.  Suspected mild centrilobular emphysematous seen within the upper lobes.    Small bilateral renal cysts are seen.  The visualized abdominal structures are otherwise unremarkable.  Osseous structures demonstrate degenerative change.  Lower cervical fusion hardware is partially visualized.  Extrathoracic soft tissues are unremarkable.                                X-Ray Chest 1 View (Final result)  Result time 06/19/19 20:13:57    Final result by Cole Santana MD (06/19/19 20:13:57)                 Impression:      Left basilar opacity suggesting pleural effusion with probable underlying atelectasis/infiltrate, with underlying mass such as pulmonary or pleural based neoplasm not excluded.  Short-term follow-up chest radiography after therapy is recommended to ensure resolution.    This report was flagged in Epic as abnormal.      Electronically signed by: Cole Santana MD  Date:    06/19/2019  Time:    20:13             Narrative:    EXAMINATION:  XR CHEST 1 VIEW    CLINICAL HISTORY:  Dyspnea, unspecified    TECHNIQUE:  Single frontal view of the chest was performed.    COMPARISON:  Chest radiograph 12/26/2018    FINDINGS:  Monitoring leads and tubing overlie the chest.  Patient is slightly rotated.    Interval opacification of the left mid  to lower lung zone obscuring the hemidiaphragm with blunting of the costophrenic angle.  Right hemithorax is well expanded and grossly clear.  Left upper lung zone is clear.  No large pneumothorax.  Heart does not appear enlarged.  Upper mediastinal contours are within normal limits noting calcific atherosclerosis of the thoracic aorta.  No acute osseous process seen.                                  Assessment/Plan:     * Dyspnea  COPD    Oxygen as needed  Pulse ox every 4 hours  Duo nebs as needed  Continue albuterol nebulizer as needed        CKD (chronic kidney disease), stage V  Cr (1.7) on admit  Strict I&Os  Renally dose all meds  Avoid Nephrotoxic agents      History of pulmonary embolism  Apixaban 2.5 mg po BID. Continue  Bleeding precautions    Essential hypertension  Norvasc 10 mg po daily. Continue       Iron deficiency anemia  Hgb (9.6), Hct (32.1) on admit  Ferrous sulfate  mg po BID. Continue      Osteoporosis  Continue home meds.      Chronic neck pain  Chronic. Continue home meds      Vitamin deficiency  Continue home meds.        VTE Risk Mitigation (From admission, onward)    FEMI MARIO Chairs, APRN, FNP-C  Department of Hospital Medicine   [facility]

## 2019-06-20 NOTE — ASSESSMENT & PLAN NOTE
COPD  Left pleural effusions  Pericardial effusion  Admission CXR revealing L pleural effusion, CT with pericardial effusion and some possible tamponade on echo.  -Continue home 2L O2 per NC  -Pulmonology consult  -S/p thoracentesis, follow labs  -Will monitor overnight and then likely d/c in the am

## 2019-06-20 NOTE — PLAN OF CARE
VN rounds:  VN cued into pt's room with pt's permission.  Pt resting in bed with  and daughter at bedside.  Pt denies pain, anxiety or dyspnea, Oxygen infusing per NC. Fall risk protocol discussed with pt.  VN instructed to call for assistance.  Pt aware and agreeable.   No acute distress noted.  Allowed time for questions.  Will continue to be available and intervene as needed.

## 2019-06-20 NOTE — ED NOTES
"Pt presents to the ED with c/o SOB and generalized swelling throughout body including legs, wrists and face x2 days. Pt states she has stage 4 kidney disease and "since ino had this kidney thing its making my COPD worse". Pt states she tried at home breathing treatments and using her inhaler with no relief. Pt denies chest pain or any other pain at this time. PT states she uses oxygen at home, 2lpm.   "

## 2019-06-20 NOTE — SUBJECTIVE & OBJECTIVE
Past Medical History:   Diagnosis Date    Anemia     Bilateral renal cysts     Cataract     Chronic LBP 7/26/2012    Chronic pain     CKD (chronic kidney disease), stage V     COPD (chronic obstructive pulmonary disease)     Dehydration     Encounter for blood transfusion     HTN (hypertension)     Lumbar spondylosis     Melanoma     of the lip    Metabolic bone disease     Migraines, neuralgic     Osteoporosis     Primary osteoarthritis of both knees     s/p Rt TKA    Pulmonary embolism with infarction     Seizures     Subdeltoid bursitis, L>R. 9/27/2012    Ulcer     Vitamin D deficiency disease        Past Surgical History:   Procedure Laterality Date    BLADDER SUSPENSION      CATARACT EXTRACTION  11/18/13    left eye    CERVICAL LAMINECTOMY      x3, fusion x1    COLONOSCOPY  2009    HYSTERECTOMY      INSERTION, IOL PROSTHESIS Left 11/18/2013    [facility]    INSERTION, IOL PROSTHESIS Right 10/28/2013    [facility]    JOINT REPLACEMENT  2001    total right knee     LUMBAR LAMINECTOMY      x 3, fusion x1    OOPHORECTOMY      PHACOEMULSIFICATION, CATARACT Left 11/18/2013    [facility]    PHACOEMULSIFICATION, CATARACT Right 10/28/2013    [facility]       Review of patient's allergies indicates:   Allergen Reactions    Aspirin      Other reaction(s): hx of ulcers    Tetracycline Swelling     Other reaction(s): Swelling    Penicillins Rash     Other reaction(s): Hives  Other reaction(s): Rash  Other reaction(s): Rash  Other reaction(s): Hives       No current facility-administered medications on file prior to encounter.      Current Outpatient Medications on File Prior to Encounter   Medication Sig    albuterol-ipratropium (DUO-NEB) 2.5 mg-0.5 mg/3 mL nebulizer solution Take 3 mLs by nebulization 3 (three) times daily as needed for Wheezing or Shortness of  Breath. Rescue    amLODIPine (NORVASC) 10 MG tablet Take 10 mg by mouth once daily.    apixaban 2.5 mg Tab Take 1 tablet (2.5 mg total) by mouth 2 (two) times daily.    calcium-vitamin D3 (CALCIUM 500 + D) 500 mg(1,250mg) -200 unit per tablet Take 1 tablet by mouth 2 (two) times daily with meals.    diazePAM (VALIUM) 2 MG tablet TAKE 1 TABLET BY MOUTH DAILY & TAKE 1 TABLET AS NEEDED    ergocalciferol (ERGOCALCIFEROL) 50,000 unit Cap Take 1 capsule (50,000 Units total) by mouth every 7 days. 03475 units weekly - 12 weeks, then monthly.    ferrous sulfate 325 (65 FE) MG EC tablet Take 1 tablet (325 mg total) by mouth 2 (two) times daily.    FLUAD 2624-2011, 65 YR UP,,PF, 45 mcg (15 mcg x 3)/0.5 mL Syrg TO BE ADMINISTERED BY PHARMACIST    fluticasone-salmeterol 500-50 mcg/dose (ADVAIR DISKUS) 500-50 mcg/dose DsDv diskus inhaler Inhale 1 puff into the lungs 2 (two) times daily. Controller    HYDROcodone-acetaminophen (NORCO)  mg per tablet Take 1 tablet by mouth 3 (three) times daily as needed.    ipratropium-albuterol (COMBIVENT)  mcg/actuation inhaler Inhale 1 puff into the lungs every 4 (four) hours as needed for Wheezing. Rescue    sodium bicarbonate 650 MG tablet Take 1 tablet (650 mg total) by mouth 3 (three) times daily.    traZODone (DESYREL) 50 MG tablet Take 1 tablet (50 mg total) by mouth every evening.    pregabalin (LYRICA) 50 MG capsule Take 1 capsule (50 mg total) by mouth 2 (two) times daily.     Family History     Problem Relation (Age of Onset)    Arthritis Mother    Cancer Father    Cataracts Father, Sister    Diabetes Maternal Aunt    Glaucoma Cousin    Heart attack Maternal Grandfather    Heart disease Maternal Grandfather    Hypertension Father, Maternal Grandfather    Stroke Mother        Tobacco Use    Smoking status: Former Smoker     Types: Cigarettes    Smokeless tobacco: Never Used   Substance and Sexual Activity    Alcohol use: Yes     Comment: Rare    Drug use:  No    Sexual activity: Never     Review of Systems   Constitutional: Positive for activity change and fatigue. Negative for fever.   HENT: Positive for facial swelling. Negative for congestion, postnasal drip and sinus pain.    Eyes: Negative for visual disturbance.   Respiratory: Positive for shortness of breath and wheezing.    Cardiovascular: Positive for leg swelling. Negative for chest pain and palpitations.   Gastrointestinal: Negative for abdominal distention, abdominal pain, diarrhea, nausea and vomiting.   Endocrine: Negative for polydipsia and polyuria.   Genitourinary: Negative for flank pain, hematuria and pelvic pain.   Musculoskeletal: Negative for back pain and joint swelling.   Skin: Negative for color change and rash.   Allergic/Immunologic: Negative for food allergies.   Neurological: Negative for tremors, seizures and headaches.   Psychiatric/Behavioral: Negative for agitation.     Objective:     Vital Signs (Most Recent):  Temp: 97.6 °F (36.4 °C) (06/19/19 2114)  Pulse: 103 (06/19/19 2114)  Resp: (!) 26 (06/19/19 2113)  BP: 129/64 (06/19/19 2114)  SpO2: (!) 94 % (06/19/19 2114) Vital Signs (24h Range):  Temp:  [97.6 °F (36.4 °C)-97.8 °F (36.6 °C)] 97.6 °F (36.4 °C)  Pulse:  [] 103  Resp:  [13-26] 26  SpO2:  [94 %-98 %] 94 %  BP: (117-129)/(62-64) 129/64     Weight: 63.5 kg (140 lb)  Body mass index is 25.4 kg/m².    Physical Exam   Constitutional: She appears well-developed and well-nourished. No distress.   HENT:   Head: Normocephalic and atraumatic.   Eyes: Pupils are equal, round, and reactive to light.   Neck: Normal range of motion. No JVD present.   Cardiovascular: Regular rhythm.   Tachycardia   Pulmonary/Chest: Effort normal. She has wheezes.   Abdominal: Soft. Bowel sounds are normal. She exhibits no distension.   Musculoskeletal: Normal range of motion. She exhibits edema.   Neurological: She is alert.   Skin: Skin is warm and dry. Capillary refill takes less than 2 seconds.    Psychiatric: She has a normal mood and affect.         CRANIAL NERVES     CN III, IV, VI   Pupils are equal, round, and reactive to light.       Significant Labs:   Bilirubin:   Recent Labs   Lab 06/19/19 1947   BILITOT 0.5     BMP:   Recent Labs   Lab 06/19/19 1947   GLU 88      K 4.2      CO2 23   BUN 16   CREATININE 1.7*   CALCIUM 9.9   MG 1.8     CBC:   Recent Labs   Lab 06/19/19 1947   WBC 6.99   HGB 9.6*   HCT 32.1*   *     CMP:   Recent Labs   Lab 06/19/19 1947      K 4.2      CO2 23   GLU 88   BUN 16   CREATININE 1.7*   CALCIUM 9.9   PROT 7.4   ALBUMIN 3.4*   BILITOT 0.5   ALKPHOS 81   AST 18   ALT 6*   ANIONGAP 10   EGFRNONAA 29*     Cardiac Markers:   Recent Labs   Lab 06/19/19 1947   *     Magnesium:   Recent Labs   Lab 06/19/19 1947   MG 1.8     Troponin:   Recent Labs   Lab 06/19/19 1947   TROPONINI 0.035*     Urine Studies:   Recent Labs   Lab 06/19/19 2205   COLORU Yellow   APPEARANCEUA Clear   PHUR 6.0   SPECGRAV <=1.005*   PROTEINUA Negative   GLUCUA Negative   KETONESU Negative   BILIRUBINUA Negative   OCCULTUA Negative   NITRITE Negative   UROBILINOGEN Negative   LEUKOCYTESUR Negative     All pertinent labs within the past 24 hours have been reviewed.    Significant Imaging: I have reviewed all pertinent imaging results/findings within the past 24 hours.     Imaging Results          CT Thorax Without Contrast (Final result)  Result time 06/19/19 22:16:04    Final result by Naila Crocker MD (06/19/19 22:16:04)                 Impression:      1. Moderate left-sided pleural effusion resulting in volume loss partial collapse of the left lower lobe.  2. Moderate pericardial effusion.      Electronically signed by: Naila Crocker MD  Date:    06/19/2019  Time:    22:16             Narrative:    EXAMINATION:  CT CHEST WITHOUT CONTRAST    CLINICAL HISTORY:  Chest pain or SOB, pleurisy or effusion suspected;    TECHNIQUE:  Low dose axial images, sagittal  and coronal reformations were obtained from the thoracic inlet to the lung bases. Contrast was not administered.    COMPARISON:  None.    FINDINGS:  Structures at the base of the neck are unremarkable.  Aorta is non-aneurysmal.  Heart is normal in size with moderate pericardial effusion seen.  Multiple prominent mediastinal lymph nodes are seen which do not meet CT criteria for enlargement.  The esophagus is unremarkable along its course.    Moderate volume left pleural effusion is seen resulting and volume loss and partial collapse of the left lower lobe.  No significant pleural effusion seen on the right.  Right lung is well expanded with minimal right basilar atelectasis seen.  Suspected mild centrilobular emphysematous seen within the upper lobes.    Small bilateral renal cysts are seen.  The visualized abdominal structures are otherwise unremarkable.  Osseous structures demonstrate degenerative change.  Lower cervical fusion hardware is partially visualized.  Extrathoracic soft tissues are unremarkable.                                X-Ray Chest 1 View (Final result)  Result time 06/19/19 20:13:57    Final result by Cole Santana MD (06/19/19 20:13:57)                 Impression:      Left basilar opacity suggesting pleural effusion with probable underlying atelectasis/infiltrate, with underlying mass such as pulmonary or pleural based neoplasm not excluded.  Short-term follow-up chest radiography after therapy is recommended to ensure resolution.    This report was flagged in Epic as abnormal.      Electronically signed by: Cole Santana MD  Date:    06/19/2019  Time:    20:13             Narrative:    EXAMINATION:  XR CHEST 1 VIEW    CLINICAL HISTORY:  Dyspnea, unspecified    TECHNIQUE:  Single frontal view of the chest was performed.    COMPARISON:  Chest radiograph 12/26/2018    FINDINGS:  Monitoring leads and tubing overlie the chest.  Patient is slightly rotated.    Interval opacification of the left mid  to lower lung zone obscuring the hemidiaphragm with blunting of the costophrenic angle.  Right hemithorax is well expanded and grossly clear.  Left upper lung zone is clear.  No large pneumothorax.  Heart does not appear enlarged.  Upper mediastinal contours are within normal limits noting calcific atherosclerosis of the thoracic aorta.  No acute osseous process seen.

## 2019-06-20 NOTE — HOSPITAL COURSE
Pulmonology was consulted, and felt that effusions were likely 2/2 malignancy. Pt had thoracentesis done on 6/20 and 1.2 L of clear fluid were removed. Labs were sent. Respiratory status improved. She was discharged home with pulmonology follow up.

## 2019-06-20 NOTE — SUBJECTIVE & OBJECTIVE
Interval History: Pt resting in bed after procedure. Discussed with . He said that she is appearing better.     Review of Systems   Unable to perform ROS: Other     Objective:     Vital Signs (Most Recent):  Temp: 98.2 °F (36.8 °C) (06/20/19 1139)  Pulse: (!) 116 (06/20/19 1155)  Resp: 14 (06/20/19 1155)  BP: 131/70 (06/20/19 1139)  SpO2: 95 % (06/20/19 1155) Vital Signs (24h Range):  Temp:  [96.2 °F (35.7 °C)-98.2 °F (36.8 °C)] 98.2 °F (36.8 °C)  Pulse:  [] 116  Resp:  [13-26] 14  SpO2:  [92 %-99 %] 95 %  BP: (109-160)/(62-80) 131/70     Weight: 68.9 kg (151 lb 14.4 oz)  Body mass index is 29.67 kg/m².    Intake/Output Summary (Last 24 hours) at 6/20/2019 1517  Last data filed at 6/20/2019 1426  Gross per 24 hour   Intake 50 ml   Output 1450 ml   Net -1400 ml      Physical Exam   Constitutional: No distress.   HENT:   Head: Normocephalic and atraumatic.   Pulmonary/Chest: Effort normal. No respiratory distress.   Musculoskeletal: She exhibits no edema.   Skin: Skin is warm and dry. She is not diaphoretic.   Nursing note and vitals reviewed.      Significant Labs:   BMP:   Recent Labs   Lab 06/19/19 1947 06/20/19 0515 06/20/19  1342   GLU 88 81 98    139  --    K 4.2 4.1  --     105  --    CO2 23 23  --    BUN 16 15  --    CREATININE 1.7* 1.7*  --    CALCIUM 9.9 9.6  --    MG 1.8  --   --      CBC:   Recent Labs   Lab 06/19/19 1947 06/20/19 0515   WBC 6.99 6.18   HGB 9.6* 9.4*   HCT 32.1* 30.8*   * 324       Significant Imaging: I have reviewed all pertinent imaging results/findings within the past 24 hours.

## 2019-06-20 NOTE — PROGRESS NOTES
06/20/19 1245   Pre-Procedure Time-out   Procedure to be Performed left thoracentesis   Correct Patient Yes   Correct Site Yes   Correct Procedure Yes   Correct Position Yes   Correct Laterality Left   H&P Note Verified Yes   Surgery consent verified Yes   Anesthesia consent verified N/A   Blood consent verified  N/A   Pre-Op/Pre-Procedure orders verified N/A   Other Documents Verified N/A   Radiology Studies Verified N/A   Relevant Lab Results Available Yes   Required Blood Products, Implants, Devices and /or Special Equipment Available N/A   Site Marked Yes   Site Marked by Whom Dr. Interiano   Site Pedro Visible No   Site Pedro Location left back   Pre-Procedural Time-Out   Allergies Reviewed Done   Hold tube feeding N/A   Verify House Officer Privileges Done   Verify consent form completed & in chart Done   Perform patient ID X's [id number] Done   Announce the procedure to be performed Done   Pedro/Assess site Done   Position patient  Done   Assemble equipment/verify supplies Done   Verify all medication & syringes are labeled Done   Confirm that all persons in room cleanse hands? (ASK, if unsure) Done   Prep Procedure site (N/A for intubations) Done   Use large drape to cover patient? (N/A for intubations) Done   Set pulse & oximetry alarms to audible; If on ventilator, place on 100% FiO2 N/A   Procedure Provider:Wear sterile gloves, hat, mask with eye shield and sterile gown Done   Procedure Assistant:Wear sterile gloves, hat, mask with eye shield and sterile gown Done   All persons in the room wearing a mask and hat N/A   Ultrasound guidance Done   Trendelenberg position for upper torso line N/A   Use chlorhexidine impregnated disk N/A   Date Dressing  N/A   Sterile field maintained throughout procedure including dressing application Done   Position confirmation (Fluoroscopy OR Chest X-ray ordered and verified) N/A   Hand and Barrier   Hand Hygiene Performed   Barrier Precautions Performed   Skin Antisepsis  betadine   Pre-Incision Time-out   Procedure to be Performed thoracentesis left   Correct Patient Yes   Correct Site Yes   Correct Procedure Yes   Correct Position Yes   Correct Laterality Left   All Members of the Procedure/Surgical Team Introduced Yes   Antibiotics Ordered/Given N/A   Allergies Reviewed Yes   Required Blood Products, Implants, Devices and /or Special Equipment Available N/A   Site Pedro Visible No

## 2019-06-20 NOTE — PLAN OF CARE
Pt reports she lives with her spouse and independent with all adls. Pt has home oxygen and has her Inogen machine in room for when discharged. Spouse can provide assistance and transportation upon d/c.  TN gave pt d/c brochure, card, and folder and encouraged to call for ay needs /concerns.    TN informed of PCC and pt agreeable; Tn sent email to P[facility] to schedule     06/20/19 1000   Discharge Assessment   Assessment Type Discharge Planning Assessment   Confirmed/corrected address and phone number on facesheet? Yes   Assessment information obtained from? Patient   Expected Length of Stay (days) 2   Communicated expected length of stay with patient/caregiver yes   Prior to hospitilization cognitive status: Alert/Oriented   Prior to hospitalization functional status: Independent   Current cognitive status: Alert/Oriented   Current Functional Status: Independent   Lives With spouse   Able to Return to Prior Arrangements yes   Is patient able to care for self after discharge? Yes   Who are your caregiver(s) and their phone number(s)? Dre (spouse) [phone]   Patient's perception of discharge disposition home or selfcare   Readmission Within the Last 30 Days no previous admission in last 30 days   Patient currently being followed by outpatient case management? No   Patient currently receives any other outside agency services? No   Equipment Currently Used at Home oxygen;nebulizer   Do you have any problems affording any of your prescribed medications? No   Is the patient taking medications as prescribed? yes   Does the patient have transportation home? Yes   Transportation Anticipated family or friend will provide   Does the patient receive services at the [facility]? No   Discharge Plan A Home   Discharge Plan B Home Health   DME Needed Upon Discharge  none   Patient/Family in Agreement with Plan yes

## 2019-06-20 NOTE — ED PROVIDER NOTES
Encounter Date: 6/19/2019    SCRIBE #1 NOTE: I, Maggy Butt, am scribing for, and in the presence of,  Dr. Brown. I have scribed the entire note.       History     Chief Complaint   Patient presents with    Shortness of Breath     Pt. c/o shortness of breath that has increased over the past 4 days. Pt. c/o increased swelling to the face, hands and feet over the past month. Pt. has stage 4 kidney disease and is not on dialysis at this time.     Katie Quevedo is a 74 y.o. female who  has a past medical history of Anemia, Bilateral renal cysts, Cataract, Chronic LBP (7/26/2012), Chronic pain, CKD (chronic kidney disease), stage V, COPD (chronic obstructive pulmonary disease), Dehydration, Encounter for blood transfusion, HTN (hypertension), Lumbar spondylosis, Melanoma, Metabolic bone disease, Migraines, neuralgic, Osteoporosis, Primary osteoarthritis of both knees, Pulmonary embolism with infarction, Seizures, Subdeltoid bursitis, L>R. (9/27/2012), Ulcer, and Vitamin D deficiency disease.  Katie Quevedo is a ** yo F who  has a past medical history of Anemia, Bilateral renal cysts, Cataract, Chronic LBP (7/26/2012), Chronic pain, CKD (chronic kidney disease), stage V, COPD (chronic obstructive pulmonary disease), Dehydration, Encounter for blood transfusion, HTN (hypertension), Lumbar spondylosis, Melanoma, Metabolic bone disease, Migraines, neuralgic, Osteoporosis, Primary osteoarthritis of both knees, Pulmonary embolism with infarction, Seizures, Subdeltoid bursitis, L>R. (9/27/2012), Ulcer, and Vitamin D deficiency disease.    74 year old F presents to the ED due to SOB for one month with generalized edema for several days.   PMHx remarkable for stage 4 CKD and COPD on home O2 prn. Patient presents today complaining of appreciable swelling to face, hands, and legs. Denies fever, chills, cough/cold symptoms, SOB, chest pain, N/V/D, abdominal pain, numbness/tingling, weakness or any other concerning symptoms.          Review of patient's allergies indicates:   Allergen Reactions    Aspirin      Other reaction(s): hx of ulcers    Tetracycline Swelling     Other reaction(s): Swelling    Penicillins Rash     Other reaction(s): Hives  Other reaction(s): Rash  Other reaction(s): Rash  Other reaction(s): Hives     Past Medical History:   Diagnosis Date    Anemia     Bilateral renal cysts     Cataract     Chronic LBP 7/26/2012    Chronic pain     CKD (chronic kidney disease), stage V     COPD (chronic obstructive pulmonary disease)     Dehydration     Encounter for blood transfusion     HTN (hypertension)     Lumbar spondylosis     Melanoma     of the lip    Metabolic bone disease     Migraines, neuralgic     Osteoporosis     Primary osteoarthritis of both knees     s/p Rt TKA    Pulmonary embolism with infarction     Seizures     Subdeltoid bursitis, L>R. 9/27/2012    Ulcer     Vitamin D deficiency disease      Past Surgical History:   Procedure Laterality Date    BLADDER SUSPENSION      CATARACT EXTRACTION  11/18/13    left eye    CERVICAL LAMINECTOMY      x3, fusion x1    COLONOSCOPY  2009    HYSTERECTOMY      INSERTION, IOL PROSTHESIS Left 11/18/2013    [facility]    INSERTION, IOL PROSTHESIS Right 10/28/2013    [facility]    JOINT REPLACEMENT  2001    total right knee     LUMBAR LAMINECTOMY      x 3, fusion x1    OOPHORECTOMY      PHACOEMULSIFICATION, CATARACT Left 11/18/2013    [facility]    PHACOEMULSIFICATION, CATARACT Right 10/28/2013    [facility]     Family History   Problem Relation Age of Onset    Arthritis Mother     Stroke Mother     Hypertension Father     Cancer Father     Cataracts Father     Diabetes Maternal Aunt     Hypertension Maternal Grandfather     Heart disease Maternal Grandfather     Heart attack Maternal Grandfather     Cataracts  Sister     Glaucoma Cousin      Social History     Tobacco Use    Smoking status: Former Smoker     Types: Cigarettes    Smokeless tobacco: Never Used   Substance Use Topics    Alcohol use: Yes     Comment: Rare    Drug use: No     Review of Systems   Constitutional: Negative for chills and fever.   HENT: Negative for sore throat.    Respiratory: Positive for shortness of breath. Negative for cough and wheezing.    Cardiovascular: Negative for chest pain.   Gastrointestinal: Negative for nausea and vomiting.   Genitourinary: Negative for dysuria, frequency and urgency.   Musculoskeletal: Negative for back pain, neck pain and neck stiffness.   Skin: Negative for rash and wound.        + Swelling   Neurological: Negative for syncope and weakness.   Hematological: Does not bruise/bleed easily.   Psychiatric/Behavioral: Negative for agitation, behavioral problems and confusion.       Physical Exam     Initial Vitals [06/19/19 1924]   BP Pulse Resp Temp SpO2   117/62 109 20 97.8 °F (36.6 °C) 95 %      MAP       --         Physical Exam    Nursing note and vitals reviewed.  Constitutional: She appears well-developed and well-nourished. She is not diaphoretic. No distress.   HENT:   Head: Normocephalic and atraumatic.   Mouth/Throat: Oropharynx is clear and moist.   Eyes: EOM are normal. Pupils are equal, round, and reactive to light.   Neck: No tracheal deviation present.   Cardiovascular: Normal rate, regular rhythm, normal heart sounds and intact distal pulses.   Pulmonary/Chest: Breath sounds normal. No stridor. No respiratory distress. She has no wheezes.   Crackles to L lung in all fields   Abdominal: Soft. Bowel sounds are normal. She exhibits no distension and no mass. There is no tenderness.   Musculoskeletal: Normal range of motion. She exhibits edema.   Trace edema to BLE   Neurological: She is alert and oriented to person, place, and time. No cranial nerve deficit or sensory deficit.   Skin: Skin is warm  and dry. Capillary refill takes less than 2 seconds. No rash noted.   Psychiatric: She has a normal mood and affect. Her behavior is normal. Thought content normal.         ED Course   Procedures  Labs Reviewed - No data to display  EKG Readings: (Independently Interpreted)   Sinus tachycardia, rate 101. No STEMI.        X-Rays:   Independently Interpreted Readings:   Other Readings:  Reviewed by myself, read by radiology.      Imaging Results           X-Ray Chest 1 View (Final result)  Result time 06/19/19 20:13:57    Final result by Cole Santana MD (06/19/19 20:13:57)                 Impression:      Left basilar opacity suggesting pleural effusion with probable underlying atelectasis/infiltrate, with underlying mass such as pulmonary or pleural based neoplasm not excluded.  Short-term follow-up chest radiography after therapy is recommended to ensure resolution.    This report was flagged in Epic as abnormal.      Electronically signed by: Cole Santana MD  Date:    06/19/2019  Time:    20:13             Narrative:    EXAMINATION:  XR CHEST 1 VIEW    CLINICAL HISTORY:  Dyspnea, unspecified    TECHNIQUE:  Single frontal view of the chest was performed.    COMPARISON:  Chest radiograph 12/26/2018    FINDINGS:  Monitoring leads and tubing overlie the chest.  Patient is slightly rotated.    Interval opacification of the left mid to lower lung zone obscuring the hemidiaphragm with blunting of the costophrenic angle.  Right hemithorax is well expanded and grossly clear.  Left upper lung zone is clear.  No large pneumothorax.  Heart does not appear enlarged.  Upper mediastinal contours are within normal limits noting calcific atherosclerosis of the thoracic aorta.  No acute osseous process seen.                               Medical Decision Making:   Initial Assessment:   75 yo F presents with dyspnea and swelling    Will obtain labs, treat symptomatically, obtain X-ray, and reassess.    Differential Diagnosis:    Differential Diagnosis includes, but is not limited to:  PE, MI/ACS, pneumothorax, pericardial effusion/tamonade, pneumonia, lung abscess, pericarditis/myocarditis, pleural effusion, lung mass, CHF exacerbation, asthma exacerbation, COPD exacerbation, aspirated/ingested foreign body, airway obstruction, CO poisoning, anemia, metabolic derangement, allergy/atopy, influenza, viral URI, viral syndrome.   Clinical Tests:   Lab Tests: Ordered and Reviewed  Radiological Study: Ordered and Reviewed  Medical Tests: Ordered and Reviewed  ED Management:  CXR with large pleural effusion  CT thorax for further evaluation  Labs consistent with CKD    Patient to be admitted to [facility] for further treatment and evaluation                   ED Course as of Jun 19 2319 Wed Jun 19, 2019 2133 BNP(!): 164 [MM]   2133 Troponin I(!): 0.035 [MM]   2133 Left basilar opacity suggesting pleural effusion   X-Ray Chest 1 View(!) [MM]   2134 Discussed with [facility] for admission.     [MM]      ED Course User Index  [MM] Maggy Butt     Clinical Impression:       ICD-10-CM ICD-9-CM   1. Pleural effusion on left J90 511.9   2. Dyspnea R06.00 786.09   3. Acute on chronic respiratory failure with hypoxia J96.21 518.84     799.02     Disposition:   Disposition: Admitted  Condition: Fair     Scribe Attestation I, Chinmay Brown,  personally performed the services described in this documentation. All medical record entries made by the scribe were at my direction and in my presence.  I have reviewed the chart and agree that the record reflects my personal performance and is accurate and complete. Chinmay Brown M.D. 5:42 PM06/21/2019                    Chinmay Brown Jr., MD  06/21/19 5036

## 2019-06-20 NOTE — HPI
Katie Quevedo is a 74-year-old female with a past medical history of COPD (on home O2), Pulmonary Embolus with infarction in 12/17 on Eliquis, CKD stage 5, osteoporosis, chronic pain, Anemia, Hypertension, Bilateral renal cysts, Cataract, Chronic LBP and Migraines. She lives in [place] with her . Her PCP is Dr. Kingston Verduzco. Her Nephrologist is Dr. Aura Diggs.     She presented to [facility] ED 6/19/2019 with a chief complaint of shortness of breath that has increased over the past 4 days. Associated symptoms include generalized edema, with pronounced swelling to face, hands and feet. Denies fever, chills, cough/cold symptoms, SOB, chest pain, N/V/D, abdominal pain, numbness/tingling, weakness or any other concerning symptoms. ED workup revealed Hgb (9.6), Hct (32.1), Cr (1.7), Albumin (3.4), BNP (164), Troponin (0.035), CXR revealed Left basilar opacity suggesting pleural effusion with probable underlying atelectasis/infiltrate, with underlying mass such as pulmonary or pleural based neoplasm not excluded. CT thorax confirmed Moderate left-sided pleural effusion resulting in volume loss partial collapse of the left lower lobe and Moderate pericardial effusion. Admitted to Hospital Medicine for further evaluation and treatment.

## 2019-06-20 NOTE — PROCEDURES
[facility]  Thoracentesis  Procedure Note    SUMMARY     Date of Procedure:  6/20/2019    Procedure: Left thoracentesis      Indications: Left pleural effusion with SOB    Pre-Operative Diagnosis: Left pleural effusion    Post-Operative Diagnosis: same      Description of the Findings of the Procedure:     Consent: Informed consent was obtained. Risks of the procedure were discussed including: infection, bleeding, pain, pneumothorax.    Under sterile conditions the patient was positioned. Chlorhexadine solution and sterile drapes were utilized.  1% plain lidocaine was used to anesthetize between the rib space after localized under ultrasound. Fluid was obtained after catheter inserted without  difficulty and suction applied with minimal blood loss.  A dressing was applied to the wound and wound care instructions were provided.     1200 ml of Leidy pleural fluid was obtained. A sample was sent to Pathology for cytogenetics, flow, and cell counts, as well as for infection analysis.    Plan:    A follow up chest x-ray was ordered.  Bed Rest for 2 hours.  Tylenol 650 mg. for pain.      Complications: None; patient tolerated the procedure well.      Estimated Blood Loss (EBL): None           Specimens: Pleural fluid sent for gram stain and cx, cell, chemistry and cytology.           Condition: stable    Disposition: Regular floor     Pt seen and examined with Pulmonary/Critical Care team and this note reviewed and validated with the following additional comments:    Supervised by me.     Evan Caban MD  Phone [phone]

## 2019-06-20 NOTE — ED NOTES
Updated pt on POC, pt v/u. NAD noted at this time. Pt is resting comfortably in bed, lights dimmed per pt request. Comfort and safety measures maintained. Will continue to monitor

## 2019-06-20 NOTE — ASSESSMENT & PLAN NOTE
COPD    Oxygen as needed  Pulse ox every 4 hours  Duo nebs as needed  Continue albuterol nebulizer as needed

## 2019-06-20 NOTE — PLAN OF CARE
Problem: Adult Inpatient Plan of Care  Goal: Plan of Care Review  Outcome: Ongoing (interventions implemented as appropriate)  Blood pressure improved,  able to urinate post lasix dose but only 50 cc/hr noted in her external catheter  not complaining of any pain, but bladder scanner shows 425 cc, in and out catheter  rendered, drained 300 cc clear urine output. NP Kateryna updated. Patient more comfortable, less dyspneic , still tachycardic on telemetry (HR- 105) afebrile. No episodes of nausea and vomiting over the night.No chest pain, no complaint of difficulty in breathing, able to sleep for a while. Maintaining 96% on 2 lpm via NC.  Free from fall. Will endorse patient to day shift Nurse.

## 2019-06-20 NOTE — PLAN OF CARE
VN cued into room for admit assessment. Patient awake and alert. Patient appears dyspnic at times answering questions. Nasal canula in place. Patient instructed to notify nursing staff if shortness of breath increases, patient verbalized understanding. Call bell in reach.  Patient unable to verify names/dosages of medication on medication rec. Patient states she will have family bring a list of home meds tomorrow to update chart.  Patient educated on plan of care and that patient is now NPO, patient voiced understanding

## 2019-06-21 VITALS
RESPIRATION RATE: 20 BRPM | WEIGHT: 148.38 LBS | TEMPERATURE: 96 F | HEIGHT: 60 IN | HEART RATE: 107 BPM | OXYGEN SATURATION: 94 % | SYSTOLIC BLOOD PRESSURE: 122 MMHG | DIASTOLIC BLOOD PRESSURE: 62 MMHG | BODY MASS INDEX: 29.13 KG/M2

## 2019-06-21 LAB
ANION GAP SERPL CALC-SCNC: 11 MMOL/L (ref 8–16)
BASOPHILS # BLD AUTO: 0.02 K/UL (ref 0–0.2)
BASOPHILS NFR BLD: 0.3 % (ref 0–1.9)
BUN SERPL-MCNC: 17 MG/DL (ref 8–23)
CALCIUM SERPL-MCNC: 9.1 MG/DL (ref 8.7–10.5)
CHLORIDE SERPL-SCNC: 102 MMOL/L (ref 95–110)
CO2 SERPL-SCNC: 24 MMOL/L (ref 23–29)
CREAT SERPL-MCNC: 1.9 MG/DL (ref 0.5–1.4)
DIFFERENTIAL METHOD: ABNORMAL
EOSINOPHIL # BLD AUTO: 0.3 K/UL (ref 0–0.5)
EOSINOPHIL NFR BLD: 5.4 % (ref 0–8)
ERYTHROCYTE [DISTWIDTH] IN BLOOD BY AUTOMATED COUNT: 14.9 % (ref 11.5–14.5)
EST. GFR  (AFRICAN AMERICAN): 29 ML/MIN/1.73 M^2
EST. GFR  (NON AFRICAN AMERICAN): 25 ML/MIN/1.73 M^2
GLUCOSE SERPL-MCNC: 91 MG/DL (ref 70–110)
HCT VFR BLD AUTO: 32 % (ref 37–48.5)
HGB BLD-MCNC: 9.8 G/DL (ref 12–16)
LYMPHOCYTES # BLD AUTO: 1.2 K/UL (ref 1–4.8)
LYMPHOCYTES NFR BLD: 18.6 % (ref 18–48)
MCH RBC QN AUTO: 28.5 PG (ref 27–31)
MCHC RBC AUTO-ENTMCNC: 30.6 G/DL (ref 32–36)
MCV RBC AUTO: 93 FL (ref 82–98)
MONOCYTES # BLD AUTO: 0.8 K/UL (ref 0.3–1)
MONOCYTES NFR BLD: 12 % (ref 4–15)
NEUTROPHILS # BLD AUTO: 4 K/UL (ref 1.8–7.7)
NEUTROPHILS NFR BLD: 63.5 % (ref 38–73)
PLATELET # BLD AUTO: 334 K/UL (ref 150–350)
PMV BLD AUTO: 9.5 FL (ref 9.2–12.9)
POTASSIUM SERPL-SCNC: 4.2 MMOL/L (ref 3.5–5.1)
RBC # BLD AUTO: 3.44 M/UL (ref 4–5.4)
SODIUM SERPL-SCNC: 137 MMOL/L (ref 136–145)
WBC # BLD AUTO: 6.25 K/UL (ref 3.9–12.7)

## 2019-06-21 PROCEDURE — 25000003 PHARM REV CODE 250: Mod: HCNC | Performed by: FAMILY MEDICINE

## 2019-06-21 PROCEDURE — 85025 COMPLETE CBC W/AUTO DIFF WBC: CPT | Mod: HCNC

## 2019-06-21 PROCEDURE — 80048 BASIC METABOLIC PNL TOTAL CA: CPT | Mod: HCNC

## 2019-06-21 PROCEDURE — 36415 COLL VENOUS BLD VENIPUNCTURE: CPT | Mod: HCNC

## 2019-06-21 PROCEDURE — 94761 N-INVAS EAR/PLS OXIMETRY MLT: CPT | Mod: HCNC

## 2019-06-21 PROCEDURE — 94640 AIRWAY INHALATION TREATMENT: CPT | Mod: HCNC

## 2019-06-21 PROCEDURE — 27000221 HC OXYGEN, UP TO 24 HOURS: Mod: HCNC

## 2019-06-21 PROCEDURE — 25000242 PHARM REV CODE 250 ALT 637 W/ HCPCS: Mod: HCNC | Performed by: FAMILY MEDICINE

## 2019-06-21 RX ADMIN — IPRATROPIUM BROMIDE AND ALBUTEROL SULFATE 3 ML: .5; 3 SOLUTION RESPIRATORY (INHALATION) at 12:06

## 2019-06-21 RX ADMIN — FERROUS SULFATE TAB EC 325 MG (65 MG FE EQUIVALENT) 325 MG: 325 (65 FE) TABLET DELAYED RESPONSE at 09:06

## 2019-06-21 NOTE — PLAN OF CARE
Problem: Adult Inpatient Plan of Care  Goal: Plan of Care Review  Outcome: Ongoing (interventions implemented as appropriate)     06/21/19 6373   Plan of Care Review   Plan of Care Reviewed With patient   Pt reported no pain at 1900,  at bed side till about 2030.  Pt able to turn self.  Thoracentesis site still covered w/clear film.      Tele: ST, reg HR low 100 teens,  No alarms.     Bed in lowest position, wheels locked, non skid socks, ID band worn, personal items and call bell with in reach, bed alarm set.

## 2019-06-21 NOTE — PLAN OF CARE
Problem: Adult Inpatient Plan of Care  Goal: Plan of Care Review     06/20/19 2008   Plan of Care Review   Plan of Care Reviewed With spouse;patient;daughter   Patient AAO x4. Patient has on tele, ST low teens, no ecotpy noted. Patient has breathing treatments PRN and schedule. Patient on 2 liters of oxygen. Patient received 40 mg Lasix today. Patient has thoracentesis, removed 1200 patrica fluid. Patient has no IV access per MD. Patient bed alarm is on, bed in the lowest position, and call light within.

## 2019-06-21 NOTE — PLAN OF CARE
Problem: Adult Inpatient Plan of Care  Goal: Plan of Care Review  Outcome: Ongoing (interventions implemented as appropriate)  The proper method of use, as well as anticipated side effects, of this aerosol treatment are discussed and demonstrated to the patient. O2 saturation 94% on nasal cannula 1.5 lpm. Will continue to monitor.

## 2019-06-21 NOTE — PLAN OF CARE
Pt has home oxygen and has her Inogen machine in room for when discharged. Spouse can provide assistance and transportation upon d/c.    Future Appointments   Date Time Provider Department Center   6/25/2019  3:40 PM Pushpa Mcmahon MD [facility]   7/1/2019 10:00 AM Clemencia Gambino MD [facility]   7/29/2019 10:15 AM Aura Diggs MD [facility]       Pt's nurse will go over medications/signs and symptoms prior to discharge       06/21/19 1035   Final Note   Assessment Type Final Discharge Note   Anticipated Discharge Disposition Home   What phone number can be called within the next 1-3 days to see how you are doing after discharge? [phone]   Hospital Follow Up  Appt(s) scheduled? Yes   Right Care Referral Info   Post Acute Recommendation No Care     Brisa Leon, RN Transitional Navigator  [phone]

## 2019-06-21 NOTE — PLAN OF CARE
Patient was assessed. Patient denies shortness of breathe. Patient verbalized feels better than yesterday. Explained paln of care with patient. Patient bed alarm is on, bed in the lowest position, and call light within.

## 2019-06-21 NOTE — PLAN OF CARE
VN note: VN cued into patient's room. Patient's family at bedside. Discharge information reviewed. Medication list reviewed, and she is also aware of her stop medications. Follow-up information given. VN also educated patient regarding thoracentesis and home care. She is also aware of signs and symptoms and when to seek medical attention. Patient verbalized understanding and all questions answered. Refer to clinical references for further education given.

## 2019-06-22 LAB
CHOLEST FLD-MCNC: 42 MG/DL
SPECIMEN SOURCE: NORMAL

## 2019-06-24 ENCOUNTER — PATIENT OUTREACH (OUTPATIENT)
Dept: ADMINISTRATIVE | Facility: CLINIC | Age: 74
End: 2019-06-24

## 2019-06-24 NOTE — PATIENT INSTRUCTIONS
Chronic Kidney Disease (CKD)     The role of the kidneys is to remove waste products and extra water from the blood.  When the kidneys do not work as they should, waste products begin to build up in the blood. This is called chronic kidney disease (CKD). CKD means that you have kidney damage or a decrease in kidney function lasting at least 3 months. CKD allows extra water, waste, and toxins to build up in the body. This can eventually become life-threatening. You might need dialysis or a kidney transplant to stay alive. This most severe form is called end stage renal disease.  Diabetes is the leading causes of chronic renal failure. Other causes include high blood pressure, hardening of the arteries (atherosclerosis), lupus, inflammation of the blood vessels (vasculitis), and past viral or bacterial infections. Certain over-the-counter pain medicines can cause renal failure when taken often over a long period of time. These include aspirin, ibuprofen, and related anti-inflammatory medicines called NSAIDs (nonsteroidal anti-inflammatory drugs).  Home care  The following guidelines will help you care for yourself at home:  · If you have diabetes, talk with your healthcare provider about keeping your blood sugar under control. Ask if you need to make and changes to your diet, lifestyle, or medicines.  · If you have high blood pressure:  ¨ Take prescribed medicine to lower your blood pressure to the recommended goal of less than 130/80.  ¨ Start a regular exercise program that you enjoy. Check with your healthcare provider to be sure your planned exercise program is right for you.  ¨ Eat less salt (sodium). Your healthcare provider can tell you how much salt per day is safe for you.  · If you are overweight, talk with your healthcare provider about a weight loss plan.  · If you smoke, you must quit. Smoking makes kidney disease worse. Talk with your healthcare provider about ways to help you quit.  For more  information, visit the following links:  ¨ www.smokefree.gov/sites/default/files/pdf/clearing-the-air-accessible.pdf  ¨ www.smokefree.gov  ¨ www.cancer.org/healthy/stayawayfromtobacco/guidetoquittingsmoking/  · Most people with CKD need to follow a special diet.  Be sure you understand yours. In general, you will need to limit protein, salt, potassium, and phosphorus. You also need to limit how much fluid you drink.   · CKD is a risk factor for heart disease. Talk with your healthcare provider about any other risk factors you might have and what you can do to lessen them.  · Talk with your healthcare provider about any medicines you are taking to find out if they need to be reduced or stopped.  · Don't use the following over-the-counter medicines, or consult your healthcare provider before using:  ¨ Aspirin and NSAIDs such as ibuprofen or naproxen. Using acetaminophen for fever or pain is OK.  ¨ Laxatives and antacids containing magnesium or aluminum  ¨ Fleet or phospho soda enemas containing phosphorus  ¨ Certain stomach acid-blocking medicine such as cimetidine or ranitidine   ¨ Decongestants containing pseudoephedrine   ¨ Herbal supplements  Follow-up care  Follow up with your healthcare provider, or as advised. Contact one of the following for more information:  · American Association of Kidney Patients 129-652-3712 www.aakp.org  · National Kidney Foundation 161-616-0106 www.kidney.org  · American Kidney Fund 398-245-6537 www.kidneyfund.org  · National Kidney Disease Education Program 866-4KIDNEY www.nkdep.nih.gov  If an X-ray, ECG (cardiogram), or other diagnostic test was taken, you will be told of any new findings that may affect your care.  Call 911  Call 911 if you have any of the following:  · Severe weakness, dizziness, fainting, drowsiness, or confusion  · Chest pain or shortness of breath  · Heart beating fast, slow, or irregularly  When to seek medical advice  Call your healthcare provider right away  if any of these occur:  · Nausea or vomiting  · Fever of 100.4°F (38°C) or higher, or as directed by your healthcare provider  · Unexpected weight gain or swelling in the legs, ankles, or around the eyes  · Decrease or absent urine output  Date Last Reviewed: 9/1/2016  © 2489-5910 [employer]. [address] [place] [address], [place] [address]. All rights reserved. This information is not intended as a substitute for professional medical care. Always follow your healthcare professional's instructions.

## 2019-06-24 NOTE — TELEPHONE ENCOUNTER
C3 nurse attempted to contact patient. No answer. The following message was left for the patient to return the call:  Good morning  I am a nurse calling on behalf of [facility] from the [facility].  This is a Transitional Care Call for Katie Quevedo. When you have a moment please contact us at [phone] or [phone] Monday through Friday, between the hours of 8 am to 4 pm. We look forward to speaking with you. On behalf of [facility] have a nice day.    The patient has a scheduled HOSFU appointment with Clemencia Gambino MD  on 7/1/2019 at 10:00

## 2019-06-25 ENCOUNTER — OFFICE VISIT (OUTPATIENT)
Dept: PHYSICAL MEDICINE AND REHAB | Facility: CLINIC | Age: 74
End: 2019-06-25
Payer: MEDICARE

## 2019-06-25 VITALS
BODY MASS INDEX: 25.55 KG/M2 | WEIGHT: 138.88 LBS | HEART RATE: 121 BPM | SYSTOLIC BLOOD PRESSURE: 118 MMHG | HEIGHT: 62 IN | DIASTOLIC BLOOD PRESSURE: 71 MMHG

## 2019-06-25 DIAGNOSIS — M96.1 CERVICAL POST-LAMINECTOMY SYNDROME: ICD-10-CM

## 2019-06-25 DIAGNOSIS — Z79.891 CHRONICALLY ON OPIATE THERAPY: ICD-10-CM

## 2019-06-25 DIAGNOSIS — M25.511 CHRONIC RIGHT SHOULDER PAIN: ICD-10-CM

## 2019-06-25 DIAGNOSIS — M96.1 LUMBAR POSTLAMINECTOMY SYNDROME: ICD-10-CM

## 2019-06-25 DIAGNOSIS — M75.51 SUBDELTOID BURSITIS, RIGHT: ICD-10-CM

## 2019-06-25 DIAGNOSIS — M54.41 CHRONIC MIDLINE LOW BACK PAIN WITH RIGHT-SIDED SCIATICA: Primary | ICD-10-CM

## 2019-06-25 DIAGNOSIS — M16.0 PRIMARY OSTEOARTHRITIS OF BOTH HIPS: ICD-10-CM

## 2019-06-25 DIAGNOSIS — G89.29 CHRONIC NECK PAIN: ICD-10-CM

## 2019-06-25 DIAGNOSIS — G89.29 CHRONIC MIDLINE LOW BACK PAIN WITH RIGHT-SIDED SCIATICA: Primary | ICD-10-CM

## 2019-06-25 DIAGNOSIS — M54.2 CHRONIC NECK PAIN: ICD-10-CM

## 2019-06-25 DIAGNOSIS — M47.26 OSTEOARTHRITIS OF SPINE WITH RADICULOPATHY, LUMBAR REGION: ICD-10-CM

## 2019-06-25 DIAGNOSIS — M17.0 PRIMARY OSTEOARTHRITIS OF BOTH KNEES: ICD-10-CM

## 2019-06-25 DIAGNOSIS — G89.29 CHRONIC RIGHT SHOULDER PAIN: ICD-10-CM

## 2019-06-25 PROCEDURE — 99999 PR PBB SHADOW E&M-EST. PATIENT-LVL III: ICD-10-PCS | Mod: PBBFAC,HCNC,, | Performed by: PHYSICAL MEDICINE & REHABILITATION

## 2019-06-25 PROCEDURE — 1101F PR PT FALLS ASSESS DOC 0-1 FALLS W/OUT INJ PAST YR: ICD-10-PCS | Mod: HCNC,CPTII,S$GLB, | Performed by: PHYSICAL MEDICINE & REHABILITATION

## 2019-06-25 PROCEDURE — 3078F DIAST BP <80 MM HG: CPT | Mod: HCNC,CPTII,S$GLB, | Performed by: PHYSICAL MEDICINE & REHABILITATION

## 2019-06-25 PROCEDURE — 3074F SYST BP LT 130 MM HG: CPT | Mod: HCNC,CPTII,S$GLB, | Performed by: PHYSICAL MEDICINE & REHABILITATION

## 2019-06-25 PROCEDURE — 99999 PR PBB SHADOW E&M-EST. PATIENT-LVL III: CPT | Mod: PBBFAC,HCNC,, | Performed by: PHYSICAL MEDICINE & REHABILITATION

## 2019-06-25 PROCEDURE — 99214 OFFICE O/P EST MOD 30 MIN: CPT | Mod: HCNC,S$GLB,, | Performed by: PHYSICAL MEDICINE & REHABILITATION

## 2019-06-25 PROCEDURE — 3074F PR MOST RECENT SYSTOLIC BLOOD PRESSURE < 130 MM HG: ICD-10-PCS | Mod: HCNC,CPTII,S$GLB, | Performed by: PHYSICAL MEDICINE & REHABILITATION

## 2019-06-25 PROCEDURE — 99214 PR OFFICE/OUTPT VISIT, EST, LEVL IV, 30-39 MIN: ICD-10-PCS | Mod: HCNC,S$GLB,, | Performed by: PHYSICAL MEDICINE & REHABILITATION

## 2019-06-25 PROCEDURE — 3078F PR MOST RECENT DIASTOLIC BLOOD PRESSURE < 80 MM HG: ICD-10-PCS | Mod: HCNC,CPTII,S$GLB, | Performed by: PHYSICAL MEDICINE & REHABILITATION

## 2019-06-25 PROCEDURE — 1101F PT FALLS ASSESS-DOCD LE1/YR: CPT | Mod: HCNC,CPTII,S$GLB, | Performed by: PHYSICAL MEDICINE & REHABILITATION

## 2019-06-25 RX ORDER — HYDROCODONE BITARTRATE AND ACETAMINOPHEN 10; 325 MG/1; MG/1
1 TABLET ORAL 3 TIMES DAILY PRN
Qty: 90 TABLET | Refills: 0 | Status: ON HOLD | OUTPATIENT
Start: 2019-06-27 | End: 2019-07-19 | Stop reason: HOSPADM

## 2019-06-25 RX ORDER — DULOXETIN HYDROCHLORIDE 30 MG/1
30 CAPSULE, DELAYED RELEASE ORAL DAILY
Qty: 30 CAPSULE | Refills: 3 | Status: SHIPPED | OUTPATIENT
Start: 2019-06-25 | End: 2019-11-04 | Stop reason: SDUPTHER

## 2019-06-25 NOTE — PROGRESS NOTES
Subjective:       Patient ID: Katie Quevedo is a 74 y.o. female.    Chief Complaint: No chief complaint on file.    HPI    HISTORY OF PRESENT ILLNESS:  Mrs. Quevedo is a 74-year-old white female with past medical history of pulmonary hypertension, on Eliquis therapy and generalized osteoarthritis.  She is followed up in the [facility] Medicine Clinic for chronic low back pain with lumbar radiculopathy, post-laminectomy syndrome, chronic neck pain with cervical radiculopathy, post-cervical laminectomy syndrome and recurrent right subdeltoid bursitis.  Her last visit to the clinic was on 11/8/18.  She was maintained on , duloxetinehydrocodone, p.r.n. tizanidine and p.r.n. Trazodone.    The patient comes today to the clinic for followup.  Her low back pain has been stable.  It is a constant aching pain in the lumbar spine.  She has occasional radiation to the right foot.  Her maximum pain is 10/10 and minimum 6/10.  Today, it is 6/10.  The patient complains of bilateral lower extremity weakness without change.  She denies any bowel or bladder incontinence.    Her neck pain has also been worse.  It is a constant aching pain in the cervical spine.  It is usually localized.  It is worse with excess movement.  Her maximum pain is 9-10/10 and minimum 3-4/10.  Today, it is 6/10.  The patient has chronic right hand  weakness without change.  She complains of infrequent numbness in right arm.    She was on Cymbalta 60 mg p.o. once per day.  However it was stopped, possibly due to kidney impairment.  She thinks it use to help.  She is agreeable to restarted at a lower dose.  She takes hydrocodone/APAP 10/325 p.r.n., usually three times per day.  She takes trazodone 50 mg po qhs as needed for sleep.        Review of Systems   Constitutional: Positive for fatigue.   Eyes: Negative for visual disturbance.   Respiratory: Positive for shortness of breath.    Cardiovascular: Negative for chest pain.   Gastrointestinal: Negative for  constipation, nausea and vomiting.   Genitourinary: Negative for difficulty urinating.   Musculoskeletal: Positive for back pain, gait problem and neck pain.   Neurological: Positive for headaches. Negative for dizziness.   Psychiatric/Behavioral: Positive for sleep disturbance. Negative for behavioral problems.       Objective:      Physical Exam   Constitutional: She is oriented to person, place, and time. She appears well-developed and well-nourished.   Coming to the clinic in an electric scooter.   Neck:   Decreased ROM.  Mild tenderness.   Musculoskeletal:   BUE:  ROM: decreased at shoulder abduction, Rt worse than Lt.  +ve Heberden's & Nilsa's nodes.  Strength:    RUE: 3+/5 at shoulder abduction, 5- elbow flexion, elbow extension, hand .   LUE: 4/5 at shoulder abduction, 5- elbow flexion, elbow extension, hand .  Sensation to pinprick:   RUE: mild decrease.   LUE: intact.      BLE:  ROM:full.  +ve bilateral knee crepitus.   Strength:      RLE: 5/5 at hip flexion, knee extension, ankle DF/PF, EHL.     LLE:  5/5 at hip flexion, knee extension, ankle DF/PF, EHL.  Sensation to pinprick:     RLE: mild decrease.      LLE: intact.   SLR (sitting):      RLE: +ve.      LLE: -ve.    -ve tenderness lumbar spine.   Neurological: She is alert and oriented to person, place, and time.   Psychiatric: She has a normal mood and affect.   Vitals reviewed.        Assessment:       1. Chronic midline low back pain with right-sided sciatica    2. Osteoarthritis of spine with radiculopathy, lumbar region    3. Lumbar postlaminectomy syndrome    4. Chronic neck pain    5. Cervical post-laminectomy syndrome    6. Primary osteoarthritis of both knees    7. Primary osteoarthritis of both hips    8. Subdeltoid bursitis, right    9. Chronic right shoulder pain    10. Chronically on opiate therapy        Plan:     - Restart DULoxetine (CYMBALTA) 30 MG capsule; Take 1 capsule (30 mg total) by mouth once daily.  - Continue  hydrocodone-acetaminophen (PERCOCET)  mg per tablet; Take 1 tablet by mouth 3 (three) times daily as needed for Pain.  - The patient is still not interested in referral for ESIs.  - Regular home exercise program was encouraged.  - A Pain contract was signed and will be scanned into the chart.  - Pain Clinic Drug Screen; Future  - Follow up in about 4 months (around 10/25/2019).      This was a 25 minute visit, more than 50% of which was spent counseling the patient about the diagnosis and the treatment plan.

## 2019-07-02 ENCOUNTER — OFFICE VISIT (OUTPATIENT)
Dept: PRIMARY CARE CLINIC | Facility: CLINIC | Age: 74
End: 2019-07-02
Payer: MEDICARE

## 2019-07-02 VITALS
WEIGHT: 137 LBS | OXYGEN SATURATION: 97 % | DIASTOLIC BLOOD PRESSURE: 67 MMHG | TEMPERATURE: 97 F | BODY MASS INDEX: 24.27 KG/M2 | HEIGHT: 63 IN | SYSTOLIC BLOOD PRESSURE: 105 MMHG | HEART RATE: 106 BPM

## 2019-07-02 DIAGNOSIS — N18.5 CKD (CHRONIC KIDNEY DISEASE), STAGE V: Chronic | ICD-10-CM

## 2019-07-02 DIAGNOSIS — Z86.711 HISTORY OF PULMONARY EMBOLISM: ICD-10-CM

## 2019-07-02 DIAGNOSIS — I31.39 PERICARDIAL EFFUSION: ICD-10-CM

## 2019-07-02 DIAGNOSIS — Z79.01 CURRENT USE OF LONG TERM ANTICOAGULATION: ICD-10-CM

## 2019-07-02 DIAGNOSIS — J96.21 ACUTE ON CHRONIC RESPIRATORY FAILURE WITH HYPOXIA: Primary | ICD-10-CM

## 2019-07-02 DIAGNOSIS — J90 PLEURAL EFFUSION ON LEFT: ICD-10-CM

## 2019-07-02 PROCEDURE — 99496 TRANSJ CARE MGMT HIGH F2F 7D: CPT | Mod: HCNC,S$GLB,, | Performed by: INTERNAL MEDICINE

## 2019-07-02 PROCEDURE — 99999 PR PBB SHADOW E&M-EST. PATIENT-LVL III: CPT | Mod: PBBFAC,HCNC,, | Performed by: INTERNAL MEDICINE

## 2019-07-02 PROCEDURE — 99999 PR PBB SHADOW E&M-EST. PATIENT-LVL III: ICD-10-PCS | Mod: PBBFAC,HCNC,, | Performed by: INTERNAL MEDICINE

## 2019-07-02 PROCEDURE — 99499 RISK ADDL DX/OHS AUDIT: ICD-10-PCS | Mod: HCNC,S$GLB,, | Performed by: INTERNAL MEDICINE

## 2019-07-02 PROCEDURE — 99499 UNLISTED E&M SERVICE: CPT | Mod: HCNC,S$GLB,, | Performed by: INTERNAL MEDICINE

## 2019-07-02 PROCEDURE — 99496 TRANSITIONAL CARE MANAGE SERVICE 7 DAY DISCHARGE: ICD-10-PCS | Mod: HCNC,S$GLB,, | Performed by: INTERNAL MEDICINE

## 2019-07-02 RX ORDER — ZOLPIDEM TARTRATE 5 MG/1
5 TABLET ORAL NIGHTLY
Refills: 5 | COMMUNITY
Start: 2019-06-04 | End: 2020-03-17

## 2019-07-02 NOTE — PROGRESS NOTES
[facility]  New Visit Progress Note   Recent Hospital Discharge     PRESENTING HISTORY     Chief Complaint/Reason for Admission:  Follow up Hospital Discharge   PCP: Kingston Verduzco MD    History of Present Illness:  Ms. Katie Quevedo is a 74 y.o. female who was recently admitted to the hospital.  [facility] Medicine  Discharge Summary        Patient Name: Katie Quevedo  MRN: [id number]  Admission Date: 6/19/2019  Hospital Length of Stay: 2 days  Discharge Date and Time: 6/21/2019  1:09 PM  Attending Physician: No att. providers found   Discharging Provider: Ramonita Shipman PA-C  Primary Care Provider: Kingston Verduzco MD  ___________________________________________________________________    Today:  [facility].  Recently hospitalized for progressive shortness of breath.  Work up significant for left pleural effusion and moderate pericardial effusion.  Thoracentesis performed on 6/20/19 with removal of 1.2 L pleural fluid.  Analysis consistent with transudate.  Routine microbial culture not performed.  No growth to date on fungal culture.   No growth to date on AFB culture.   She responded well to above intervention and supportive care.  Discharged to home.    Will see Dr Interiano [facility] ([facility] location) 7/30/19.  Will see Dr Aura Diggs, Nephrology, 7/29/19.       Review of Systems  General ROS: negative for chills, fever or weight loss  Psychological ROS: negative for hallucination, depression or suicidal ideation  Ophthalmic ROS: negative for blurry vision, photophobia or eye pain  ENT ROS: negative for epistaxis, sore throat or rhinorrhea  Respiratory ROS: no cough, or wheezing + shortness of breath   Cardiovascular ROS: no chest pain + dyspnea on exertion  Gastrointestinal ROS: no abdominal pain, change in bowel habits, or black/ bloody stools  Genito-Urinary ROS: no dysuria, trouble voiding, or hematuria  Musculoskeletal ROS:  negative for gait disturbance or muscular weakness  Neurological ROS: no syncope or seizures; no ataxia  Dermatological ROS: negative for pruritis, rash and jaundice      PAST HISTORY:     Past Medical History:   Diagnosis Date    Anemia     Bilateral renal cysts     Cataract     Chronic LBP 7/26/2012    Chronic pain     CKD (chronic kidney disease), stage V     COPD (chronic obstructive pulmonary disease)     Dehydration     Encounter for blood transfusion     HTN (hypertension)     Lumbar spondylosis     Melanoma     of the lip    Metabolic bone disease     Migraines, neuralgic     Osteoporosis     Primary osteoarthritis of both knees     s/p Rt TKA    Pulmonary embolism with infarction     Seizures     Subdeltoid bursitis, L>R. 9/27/2012    Ulcer     Vitamin D deficiency disease        Past Surgical History:   Procedure Laterality Date    BLADDER SUSPENSION      CATARACT EXTRACTION  11/18/13    left eye    CERVICAL LAMINECTOMY      x3, fusion x1    COLONOSCOPY  2009    HYSTERECTOMY      INSERTION, IOL PROSTHESIS Left 11/18/2013    [facility]    INSERTION, IOL PROSTHESIS Right 10/28/2013    [facility]    JOINT REPLACEMENT  2001    total right knee     LUMBAR LAMINECTOMY      x 3, fusion x1    OOPHORECTOMY      PHACOEMULSIFICATION, CATARACT Left 11/18/2013    [facility]    PHACOEMULSIFICATION, CATARACT Right 10/28/2013    [facility]       Family History   Problem Relation Age of Onset    Arthritis Mother     Stroke Mother     Hypertension Father     Cancer Father     Cataracts Father     Diabetes Maternal Aunt     Hypertension Maternal Grandfather     Heart disease Maternal Grandfather     Heart attack Maternal Grandfather     Cataracts Sister     Glaucoma Cousin          MEDICATIONS & ALLERGIES:     Current Outpatient Medications on File Prior to  Visit   Medication Sig Dispense Refill    albuterol-ipratropium (DUO-NEB) 2.5 mg-0.5 mg/3 mL nebulizer solution Take 3 mLs by nebulization 3 (three) times daily as needed for Wheezing or Shortness of Breath. Rescue 1 Box 11    amLODIPine (NORVASC) 10 MG tablet Take 10 mg by mouth once daily.      calcium-vitamin D3 (CALCIUM 500 + D) 500 mg(1,250mg) -200 unit per tablet Take 1 tablet by mouth 2 (two) times daily with meals.      diazePAM (VALIUM) 2 MG tablet TAKE 1 TABLET BY MOUTH DAILY & TAKE 1 TABLET AS NEEDED  5    DULoxetine (CYMBALTA) 30 MG capsule Take 1 capsule (30 mg total) by mouth once daily. 30 capsule 3    ergocalciferol (ERGOCALCIFEROL) 50,000 unit Cap Take 1 capsule (50,000 Units total) by mouth every 7 days. 61814 units weekly - 12 weeks, then monthly. 30 capsule 0    ferrous sulfate 325 (65 FE) MG EC tablet Take 1 tablet (325 mg total) by mouth 2 (two) times daily.  0    fluticasone-salmeterol 500-50 mcg/dose (ADVAIR DISKUS) 500-50 mcg/dose DsDv diskus inhaler Inhale 1 puff into the lungs 2 (two) times daily. Controller 60 each 11    HYDROcodone-acetaminophen (NORCO)  mg per tablet Take 1 tablet by mouth 3 (three) times daily as needed. 90 tablet 0    ipratropium-albuterol (COMBIVENT)  mcg/actuation inhaler Inhale 1 puff into the lungs every 4 (four) hours as needed for Wheezing. Rescue 1 Package 3    sodium bicarbonate 650 MG tablet Take 1 tablet (650 mg total) by mouth 3 (three) times daily. 120 tablet 11    traZODone (DESYREL) 50 MG tablet Take 1 tablet (50 mg total) by mouth every evening. 30 tablet 11    zolpidem (AMBIEN) 5 MG Tab Take 5 mg by mouth once daily.  5     No current facility-administered medications on file prior to visit.         Review of patient's allergies indicates:   Allergen Reactions    Aspirin      Other reaction(s): hx of ulcers    Tetracycline Swelling     Other reaction(s): Swelling    Penicillins Rash     Other reaction(s): Hives  Other  "reaction(s): Rash  Other reaction(s): Rash  Other reaction(s): Hives       OBJECTIVE:     Vital Signs:  /67 (BP Location: Right arm, Patient Position: Sitting, BP Method: Large (Automatic))   Pulse 106   Temp 97.4 °F (36.3 °C) (Oral)   Ht 5' 2.5" (1.588 m)   Wt 62.1 kg (137 lb 0.3 oz)   SpO2 97%   BMI 24.66 kg/m²   Wt Readings from Last 1 Encounters:   07/02/19 1516 62.1 kg (137 lb 0.3 oz)     Body mass index is 24.66 kg/m².   97%    Physical Exam:  /67 (BP Location: Right arm, Patient Position: Sitting, BP Method: Large (Automatic))   Pulse 106   Temp 97.4 °F (36.3 °C) (Oral)   Ht 5' 2.5" (1.588 m)   Wt 62.1 kg (137 lb 0.3 oz)   SpO2 97%   BMI 24.66 kg/m²   General appearance: alert, cooperative, no distress  Constitutional:Oriented to person, place, and time  + appears well-developed and well-nourished.   HEENT: Normocephalic, atraumatic, neck symmetrical, no nasal discharge   Eyes: conjunctivae/corneas clear, PERRL, EOM's intact  Lungs: + CALI expiratory wheezes, decreased air movement RLL, good inspiratory effort   Heart: regular rate and rhythm without rub; no displacement of the PMI   Abdomen: soft, non-tender; bowel sounds normoactive; no organomegaly  Extremities: extremities symmetric; no clubbing, cyanosis, or edema  Integument: Skin color, texture, turgor normal; no rashes; hair distrubution normal  Neurologic: Alert and oriented X 3, normal strength, normal coordination and gait  Psychiatric: no pressured speech; normal affect; no evidence of impaired cognition     Laboratory  Lab Results   Component Value Date    WBC 6.25 06/21/2019    HGB 9.8 (L) 06/21/2019    HCT 32.0 (L) 06/21/2019    MCV 93 06/21/2019     06/21/2019     BMP  Lab Results   Component Value Date     06/21/2019    K 4.2 06/21/2019     06/21/2019    CO2 24 06/21/2019    BUN 17 06/21/2019    CREATININE 1.9 (H) 06/21/2019    CALCIUM 9.1 06/21/2019    ANIONGAP 11 06/21/2019    ESTGFRAFRICA 29 (A) " 06/21/2019    EGFRNONAA 25 (A) 06/21/2019     Lab Results   Component Value Date    ALT 6 (L) 06/19/2019    AST 18 06/19/2019    ALKPHOS 81 06/19/2019    BILITOT 0.5 06/19/2019     Lab Results   Component Value Date    INR 1.1 12/26/2018    INR 0.9 03/05/2018    INR 0.9 12/13/2017     Lab Results   Component Value Date    HGBA1C 5.2 01/22/2018       Diagnostic Results:  Pleural Fluid Cytology 6/20/19:  Left pleural fluid (cytology):  - chronic inflammation with predominately small lymphocytes, and mesothelial cells.  - Negative for epithelial cells or malignancy.    CT Chest 6/19/19:  1. Moderate left-sided pleural effusion resulting in volume loss partial collapse of the left lower lobe.  2. Moderate pericardial effusion.    TRANSITION OF CARE:     [facility] Contact Note: 6/24/19    Family and/or Caretaker present at visit?  No.  Diagnostic tests reviewed/disposition: I have reviewed all completed as well as pending diagnostic tests at the time of discharge.  Disease/illness education: Yes  Home health/community services discussion/referrals: Patient does not have home health established from hospital visit.  They do not need home health.  If needed, we will set up home health for the patient.   Establishment or re-establishment of referral orders for community resources: No other necessary community resources.   Discussion with other health care providers: No discussion with other health care providers necessary.     ASSESSMENT & PLAN:     Acute on chronic respiratory failure with hypoxia  Pleural effusion on left  Pericardial effusion   - s/p thoracentesis while hospitalized   - no malignant cells identified on cytology  - has Pulmonary follow up with [facility] 7/30/19    CKD (chronic kidney disease), stage V  - followed by Nephrology    History of pulmonary embolism  Current use of long term anticoagulation  - anticoagulation discontinued during recent hospitalization  - I will communicate with the Hospital  Medicine team regarding this plan     I will [facility].   Instructions for the patient:      Scheduled Follow-up :  Future Appointments   Date Time Provider Department Center   7/29/2019 10:15 AM Aura Diggs MD [facility]   8/15/2019  3:00 PM Clemencia Gambino MD [facility]   11/4/2019  4:00 PM Pushpa Mcmahon MD [facility]       Post Visit Medication List:     Medication List           Accurate as of 7/2/19 11:59 PM. If you have any questions, ask your nurse or doctor.               CONTINUE taking these medications    amLODIPine 10 MG tablet  Commonly known as:  NORVASC     CALCIUM 500 + D 500 mg(1,250mg) -200 unit per tablet  Generic drug:  calcium-vitamin D3     diazePAM 2 MG tablet  Commonly known as:  VALIUM     DULoxetine 30 MG capsule  Commonly known as:  CYMBALTA  Take 1 capsule (30 mg total) by mouth once daily.     ergocalciferol 50,000 unit Cap  Commonly known as:  ERGOCALCIFEROL  Take 1 capsule (50,000 Units total) by mouth every 7 days. 32863 units weekly - 12 weeks, then monthly.     ferrous sulfate 325 (65 FE) MG EC tablet  Take 1 tablet (325 mg total) by mouth 2 (two) times daily.     fluticasone-salmeterol 500-50 mcg/dose 500-50 mcg/dose Dsdv diskus inhaler  Commonly known as:  ADVAIR DISKUS  Inhale 1 puff into the lungs 2 (two) times daily. Controller     HYDROcodone-acetaminophen  mg per tablet  Commonly known as:  NORCO  Take 1 tablet by mouth 3 (three) times daily as needed.     * ipratropium-albuterol  mcg/actuation inhaler  Commonly known as:  COMBIVENT  Inhale 1 puff into the lungs every 4 (four) hours as needed for Wheezing. Rescue     * albuterol-ipratropium 2.5 mg-0.5 mg/3 mL nebulizer solution  Commonly known as:  DUO-NEB  Take 3 mLs by nebulization 3 (three) times daily as needed for Wheezing or Shortness of Breath. Rescue     sodium bicarbonate 650 MG tablet  Take 1 tablet (650 mg total) by mouth 3  (three) times daily.     traZODone 50 MG tablet  Commonly known as:  DESYREL  Take 1 tablet (50 mg total) by mouth every evening.     zolpidem 5 MG Tab  Commonly known as:  AMBIEN         * This list has 2 medication(s) that are the same as other medications prescribed for you. Read the directions carefully, and ask your doctor or other care provider to review them with you.                Signing Physician:  Clemencia Gambino MD

## 2019-07-02 NOTE — ASSESSMENT & PLAN NOTE
COPD  Left pleural effusions  Pericardial effusion  Admission CXR revealing L pleural effusion, CT with pericardial effusion and some possible tamponade on echo.  -Continue home 2L O2 per [place]  -Pulmonology referral  -S/p thoracentesis, follow labs

## 2019-07-02 NOTE — DISCHARGE SUMMARY
[facility] Hospital Medicine  Discharge Summary      Patient Name: Katie Quevedo  MRN: [id number]  Admission Date: 6/19/2019  Hospital Length of Stay: 2 days  Discharge Date and Time: 6/21/2019  1:09 PM  Attending Physician: Jasmine att. providers found   Discharging Provider: Ramonita Shipman PA-C  Primary Care Provider: Kingston Verduzco MD      HPI:   Katie Quevedo is a 74-year-old female with a past medical history of COPD (on home O2), Pulmonary Embolus with infarction in 12/17 on Eliquis, CKD stage 5, osteoporosis, chronic pain, Anemia, Hypertension, Bilateral renal cysts, Cataract, Chronic LBP and Migraines. She lives in [place] with her . Her PCP is Dr. Kingston Verduzco. Her Nephrologist is Dr. Aura Diggs.     She presented to [facility] 6/19/2019 with a chief complaint of shortness of breath that has increased over the past 4 days. Associated symptoms include generalized edema, with pronounced swelling to face, hands and feet. Denies fever, chills, cough/cold symptoms, SOB, chest pain, N/V/D, abdominal pain, numbness/tingling, weakness or any other concerning symptoms. ED workup revealed Hgb (9.6), Hct (32.1), Cr (1.7), Albumin (3.4), BNP (164), Troponin (0.035), CXR revealed Left basilar opacity suggesting pleural effusion with probable underlying atelectasis/infiltrate, with underlying mass such as pulmonary or pleural based neoplasm not excluded. CT thorax confirmed Moderate left-sided pleural effusion resulting in volume loss partial collapse of the left lower lobe and Moderate pericardial effusion. Admitted to Hospital Medicine for further evaluation and treatment.    * No surgery found *      Hospital Course:   Pulmonology was consulted, and felt that effusions were likely 2/2 malignancy. Pt had thoracentesis done on 6/20 and 1.2 L of clear fluid were removed. Labs were sent. Respiratory status improved. She was discharged home with pulmonology follow up.     Consults:     * Acute  on chronic respiratory failure with hypoxia  COPD  Left pleural effusions  Pericardial effusion  Admission CXR revealing L pleural effusion, CT with pericardial effusion and some possible tamponade on echo.  -Continue home 2L O2 per NC  -Pulmonology referral  -S/p thoracentesis, follow labs    Chronic neck pain  Chronic. Continue home meds    CKD (chronic kidney disease), stage V  Cr (1.7) on admit  -Strict I&Os  -Renally dose all meds  -Avoid Nephrotoxic agents    History of pulmonary embolism  Pt with high probability VQ scan in 3/5/18.   -Will d/c apixaban per pulmonology    Essential hypertension  -Norvasc 10 mg po daily. Continue     Iron deficiency anemia  Hgb (9.6), Hct (32.1) on admit  -Ferrous sulfate  mg po BID    Osteoporosis  Continue home meds    Vitamin deficiency  Continue home meds.      Final Active Diagnoses:    Diagnosis Date Noted POA    PRINCIPAL PROBLEM:  Acute on chronic respiratory failure with hypoxia [J96.21] 06/19/2019 Yes    Chronic neck pain [M54.2, G89.29] 07/24/2012 Yes     Chronic    CKD (chronic kidney disease), stage V [N18.5]  Yes     Chronic    Pleural effusion on left [J90]  Yes    History of pulmonary embolism [Z86.711] 07/31/2018 Yes    Iron deficiency anemia [D50.9] 01/22/2018 Yes    Essential hypertension [I10] 01/22/2018 Yes     Chronic    Osteoporosis [M81.0] 09/21/2015 Yes    Vitamin deficiency [E56.9]  Yes      Problems Resolved During this Admission:       Discharged Condition: stable    Disposition: Home or Self Care    Follow Up:  Follow-up Information     Clemencia Gambino MD On 7/1/2019.    Specialty:  Hospitalist  Why:  time: 10:00  Contact information:  [address]  [address]  [place] [id number]  [phone]             Please follow up.    Why:  Ambultory Referral placed with Pulmonology, their offices madeleine contact patient with appointment information.               Patient Instructions:      Ambulatory referral to Pulmonology   Referral  Priority: Routine Referral Type: Consultation   Referral Reason: Specialty Services Required   Requested Specialty: Pulmonary Disease   Number of Visits Requested: 1     Diet Adult Regular     Notify your health care provider if you experience any of the following:  difficulty breathing or increased cough     Activity as tolerated       Significant Diagnostic Studies: Labs: All labs within the past 24 hours have been reviewed    Pending Diagnostic Studies:     None         Medications:  Reconciled Home Medications:      Medication List      CONTINUE taking these medications    amLODIPine 10 MG tablet  Commonly known as:  NORVASC  Take 10 mg by mouth once daily.     CALCIUM 500 + D 500 mg(1,250mg) -200 unit per tablet  Generic drug:  calcium-vitamin D3  Take 1 tablet by mouth 2 (two) times daily with meals.     diazePAM 2 MG tablet  Commonly known as:  VALIUM  TAKE 1 TABLET BY MOUTH DAILY & TAKE 1 TABLET AS NEEDED     ergocalciferol 50,000 unit Cap  Commonly known as:  ERGOCALCIFEROL  Take 1 capsule (50,000 Units total) by mouth every 7 days. 43556 units weekly - 12 weeks, then monthly.     ferrous sulfate 325 (65 FE) MG EC tablet  Take 1 tablet (325 mg total) by mouth 2 (two) times daily.     fluticasone-salmeterol 500-50 mcg/dose 500-50 mcg/dose Dsdv diskus inhaler  Commonly known as:  ADVAIR DISKUS  Inhale 1 puff into the lungs 2 (two) times daily. Controller     * ipratropium-albuterol  mcg/actuation inhaler  Commonly known as:  COMBIVENT  Inhale 1 puff into the lungs every 4 (four) hours as needed for Wheezing. Rescue     * albuterol-ipratropium 2.5 mg-0.5 mg/3 mL nebulizer solution  Commonly known as:  DUO-NEB  Take 3 mLs by nebulization 3 (three) times daily as needed for Wheezing or Shortness of Breath. Rescue     sodium bicarbonate 650 MG tablet  Take 1 tablet (650 mg total) by mouth 3 (three) times daily.     traZODone 50 MG tablet  Commonly known as:  DESYREL  Take 1 tablet (50 mg total) by mouth  every evening.         * This list has 2 medication(s) that are the same as other medications prescribed for you. Read the directions carefully, and ask your doctor or other care provider to review them with you.            STOP taking these medications    apixaban 2.5 mg Tab  Commonly known as:  ELIQUIS     FLUAD [id number] (65 YR UP)(PF) 45 mcg (15 mcg x 3)/0.5 mL Syrg  Generic drug:  flu vac 2018 [id number](PF)            Indwelling Lines/Drains at time of discharge:   Lines/Drains/Airways          None          Time spent on the discharge of patient: 45 minutes  Patient was seen and examined on the date of discharge and determined to be suitable for discharge.         Ramonita Shipman PA-C  Department of Hospital Medicine  [facility]

## 2019-07-05 PROBLEM — I31.39 PERICARDIAL EFFUSION: Status: ACTIVE | Noted: 2019-07-05

## 2019-07-05 PROBLEM — D72.829 LEUKOCYTOSIS: Status: RESOLVED | Noted: 2018-12-28 | Resolved: 2019-07-05

## 2019-07-05 PROBLEM — G93.40 ACUTE ENCEPHALOPATHY: Status: RESOLVED | Noted: 2018-12-26 | Resolved: 2019-07-05

## 2019-07-05 PROBLEM — I26.92 ACUTE SADDLE PULMONARY EMBOLISM WITHOUT ACUTE COR PULMONALE: Status: RESOLVED | Noted: 2017-12-13 | Resolved: 2019-07-05

## 2019-07-08 ENCOUNTER — PES CALL (OUTPATIENT)
Dept: ADMINISTRATIVE | Facility: CLINIC | Age: 74
End: 2019-07-08

## 2019-07-17 ENCOUNTER — HOSPITAL ENCOUNTER (INPATIENT)
Facility: HOSPITAL | Age: 74
LOS: 2 days | Discharge: HOME | DRG: 187 | End: 2019-07-19
Attending: EMERGENCY MEDICINE | Admitting: INTERNAL MEDICINE
Payer: MEDICARE

## 2019-07-17 DIAGNOSIS — J90 RECURRENT PLEURAL EFFUSION ON LEFT: ICD-10-CM

## 2019-07-17 DIAGNOSIS — I31.39 PERICARDIAL EFFUSION: Primary | ICD-10-CM

## 2019-07-17 DIAGNOSIS — I10 ESSENTIAL HYPERTENSION: Chronic | ICD-10-CM

## 2019-07-17 DIAGNOSIS — J44.89 COPD (CHRONIC OBSTRUCTIVE PULMONARY DISEASE) WITH CHRONIC BRONCHITIS: Chronic | ICD-10-CM

## 2019-07-17 DIAGNOSIS — J90 PLEURAL EFFUSION ON LEFT: ICD-10-CM

## 2019-07-17 DIAGNOSIS — R06.09 DOE (DYSPNEA ON EXERTION): ICD-10-CM

## 2019-07-17 DIAGNOSIS — J90 RECURRENT LEFT PLEURAL EFFUSION: ICD-10-CM

## 2019-07-17 DIAGNOSIS — D63.1 ANEMIA, CHRONIC RENAL FAILURE, STAGE 4 (SEVERE): ICD-10-CM

## 2019-07-17 DIAGNOSIS — R06.02 SHORTNESS OF BREATH: ICD-10-CM

## 2019-07-17 DIAGNOSIS — J96.11 CHRONIC RESPIRATORY FAILURE WITH HYPOXIA: ICD-10-CM

## 2019-07-17 DIAGNOSIS — N18.4 ANEMIA, CHRONIC RENAL FAILURE, STAGE 4 (SEVERE): ICD-10-CM

## 2019-07-17 DIAGNOSIS — N18.4 CKD (CHRONIC KIDNEY DISEASE), STAGE IV: ICD-10-CM

## 2019-07-17 LAB
ACID FAST MOD KINY STN SPEC: NORMAL
ALBUMIN SERPL BCP-MCNC: 3.7 G/DL (ref 3.5–5.2)
ALP SERPL-CCNC: 87 U/L (ref 55–135)
ALT SERPL W/O P-5'-P-CCNC: 9 U/L (ref 10–44)
ANION GAP SERPL CALC-SCNC: 12 MMOL/L (ref 8–16)
AORTIC ROOT ANNULUS: 2.69 CM
APPEARANCE FLD: NORMAL
APTT BLDCRRT: 26.6 SEC (ref 21–32)
AST SERPL-CCNC: 22 U/L (ref 10–40)
AV INDEX (PROSTH): 1.13
AV MEAN GRADIENT: 3 MMHG
AV PEAK GRADIENT: 6 MMHG
AV VALVE AREA: 2.47 CM2
AV VELOCITY RATIO: 1.08
BACTERIA #/AREA URNS HPF: ABNORMAL /HPF
BILIRUB SERPL-MCNC: 0.5 MG/DL (ref 0.1–1)
BILIRUB UR QL STRIP: NEGATIVE
BNP SERPL-MCNC: 172 PG/ML (ref 0–99)
BODY FLD TYPE: NORMAL
BODY FLUID SOURCE, LDH: NORMAL
BSA FOR ECHO PROCEDURE: 1.65 M2
BUN SERPL-MCNC: 13 MG/DL (ref 8–23)
CALCIUM SERPL-MCNC: 10.2 MG/DL (ref 8.7–10.5)
CHLORIDE SERPL-SCNC: 103 MMOL/L (ref 95–110)
CHOLEST SERPL-MCNC: 166 MG/DL (ref 120–199)
CHOLEST/HDLC SERPL: 3.5 {RATIO} (ref 2–5)
CLARITY UR: CLEAR
CO2 SERPL-SCNC: 20 MMOL/L (ref 23–29)
COLOR FLD: YELLOW
COLOR UR: YELLOW
CREAT SERPL-MCNC: 2 MG/DL (ref 0.5–1.4)
CV ECHO LV RWT: 0.4 CM
DOP CALC AO PEAK VEL: 1.19 M/S
DOP CALC AO VTI: 20.85 CM
DOP CALC LVOT AREA: 2.2 CM2
DOP CALC LVOT DIAMETER: 1.67 CM
DOP CALC LVOT PEAK VEL: 1.29 M/S
DOP CALC LVOT STROKE VOLUME: 51.43 CM3
DOP CALCLVOT PEAK VEL VTI: 23.49 CM
E WAVE DECELERATION TIME: 192.28 MSEC
E/A RATIO: 1.75
ECHO LV POSTERIOR WALL: 0.7 CM (ref 0.6–1.1)
ERYTHROCYTE [DISTWIDTH] IN BLOOD BY AUTOMATED COUNT: 14.3 % (ref 11.5–14.5)
EST. GFR  (AFRICAN AMERICAN): 27 ML/MIN/1.73 M^2
EST. GFR  (NON AFRICAN AMERICAN): 24 ML/MIN/1.73 M^2
FERRITIN SERPL-MCNC: 148 NG/ML (ref 20–300)
FOLATE SERPL-MCNC: 4 NG/ML (ref 4–24)
FRACTIONAL SHORTENING: 26 % (ref 28–44)
GLUCOSE SERPL-MCNC: 84 MG/DL (ref 70–110)
GLUCOSE UR QL STRIP: NEGATIVE
GRAM STN SPEC: NORMAL
GRAM STN SPEC: NORMAL
HCT VFR BLD AUTO: 37.1 % (ref 37–48.5)
HDLC SERPL-MCNC: 47 MG/DL (ref 40–75)
HDLC SERPL: 28.3 % (ref 20–50)
HGB BLD-MCNC: 11.4 G/DL (ref 12–16)
HGB BLD-MCNC: 11.6 G/DL (ref 12–16)
HGB UR QL STRIP: NEGATIVE
INR PPP: 1 (ref 0.8–1.2)
INTERVENTRICULAR SEPTUM: 0.61 CM (ref 0.6–1.1)
IRON SERPL-MCNC: 35 UG/DL (ref 30–160)
IVRT: 0.11 MSEC
KETONES UR QL STRIP: NEGATIVE
LA MAJOR: 4.57 CM
LA MINOR: 4.46 CM
LA WIDTH: 2.75 CM
LDH FLD L TO P-CCNC: 173 U/L
LDLC SERPL CALC-MCNC: 89.8 MG/DL (ref 63–159)
LEFT ATRIUM SIZE: 3 CM
LEFT ATRIUM VOLUME INDEX: 19.5 ML/M2
LEFT ATRIUM VOLUME: 31.66 CM3
LEFT INTERNAL DIMENSION IN SYSTOLE: 2.59 CM (ref 2.1–4)
LEFT VENTRICLE DIASTOLIC VOLUME INDEX: 31.76 ML/M2
LEFT VENTRICLE DIASTOLIC VOLUME: 51.69 ML
LEFT VENTRICLE MASS INDEX: 36 G/M2
LEFT VENTRICLE SYSTOLIC VOLUME INDEX: 15 ML/M2
LEFT VENTRICLE SYSTOLIC VOLUME: 24.46 ML
LEFT VENTRICULAR INTERNAL DIMENSION IN DIASTOLE: 3.52 CM (ref 3.5–6)
LEFT VENTRICULAR MASS: 58.06 G
LEUKOCYTE ESTERASE UR QL STRIP: ABNORMAL
LYMPHOCYTES NFR FLD MANUAL: 66 %
MAGNESIUM SERPL-MCNC: 2 MG/DL (ref 1.6–2.6)
MCH RBC QN AUTO: 28.1 PG (ref 27–31)
MCHC RBC AUTO-ENTMCNC: 30.7 G/DL (ref 32–36)
MCV RBC AUTO: 92 FL (ref 82–98)
MESOTHL CELL NFR FLD MANUAL: 15 %
MICROSCOPIC COMMENT: ABNORMAL
MV PEAK A VEL: 0.79 M/S
MV PEAK E VEL: 1.38 M/S
NEUTROPHILS NFR FLD MANUAL: 19 %
NITRITE UR QL STRIP: NEGATIVE
NON-SQ EPI CELLS #/AREA URNS HPF: 2 /HPF
NONHDLC SERPL-MCNC: 119 MG/DL
PH UR STRIP: 5 [PH] (ref 5–8)
PHOSPHATE SERPL-MCNC: 4 MG/DL (ref 2.7–4.5)
PLATELET # BLD AUTO: 344 K/UL (ref 150–350)
PMV BLD AUTO: 9.3 FL (ref 9.2–12.9)
POTASSIUM SERPL-SCNC: 4.1 MMOL/L (ref 3.5–5.1)
PROT FLD-MCNC: 3.9 G/DL
PROT SERPL-MCNC: 7.9 G/DL (ref 6–8.4)
PROT UR QL STRIP: NEGATIVE
PROTHROMBIN TIME: 10.2 SEC (ref 9–12.5)
PULM VEIN S/D RATIO: 1.17
PV PEAK D VEL: 0.23 M/S
PV PEAK S VEL: 0.27 M/S
RA MAJOR: 4.08 CM
RA PRESSURE: 3 MMHG
RBC # BLD AUTO: 4.05 M/UL (ref 4–5.4)
RIGHT VENTRICULAR END-DIASTOLIC DIMENSION: 2.26 CM
SATURATED IRON: 8 % (ref 20–50)
SODIUM SERPL-SCNC: 135 MMOL/L (ref 136–145)
SP GR UR STRIP: 1.01 (ref 1–1.03)
SPECIMEN SOURCE: NORMAL
SQUAMOUS #/AREA URNS HPF: 4 /HPF
TOTAL IRON BINDING CAPACITY: 423 UG/DL (ref 250–450)
TRANSFERRIN SERPL-MCNC: 286 MG/DL (ref 200–375)
TRIGL SERPL-MCNC: 146 MG/DL (ref 30–150)
TROPONIN I SERPL DL<=0.01 NG/ML-MCNC: 0.02 NG/ML (ref 0–0.03)
TSH SERPL DL<=0.005 MIU/L-ACNC: 0.57 UIU/ML (ref 0.4–4)
URN SPEC COLLECT METH UR: ABNORMAL
UROBILINOGEN UR STRIP-ACNC: NEGATIVE EU/DL
VIT B12 SERPL-MCNC: 647 PG/ML (ref 210–950)
WBC # BLD AUTO: 8.8 K/UL (ref 3.9–12.7)
WBC # FLD: 1016 /CU MM
WBC #/AREA URNS HPF: 6 /HPF (ref 0–5)

## 2019-07-17 PROCEDURE — 89051 BODY FLUID CELL COUNT: CPT | Mod: HCNC

## 2019-07-17 PROCEDURE — 80053 COMPREHEN METABOLIC PANEL: CPT | Mod: HCNC

## 2019-07-17 PROCEDURE — 80061 LIPID PANEL: CPT | Mod: HCNC

## 2019-07-17 PROCEDURE — 87116 MYCOBACTERIA CULTURE: CPT | Mod: HCNC

## 2019-07-17 PROCEDURE — 93005 ELECTROCARDIOGRAM TRACING: CPT | Mod: HCNC

## 2019-07-17 PROCEDURE — 87206 SMEAR FLUORESCENT/ACID STAI: CPT | Mod: HCNC

## 2019-07-17 PROCEDURE — 25000003 PHARM REV CODE 250: Mod: HCNC | Performed by: INTERNAL MEDICINE

## 2019-07-17 PROCEDURE — 84484 ASSAY OF TROPONIN QUANT: CPT | Mod: HCNC

## 2019-07-17 PROCEDURE — 27000221 HC OXYGEN, UP TO 24 HOURS: Mod: HCNC

## 2019-07-17 PROCEDURE — 25000003 PHARM REV CODE 250: Mod: HCNC | Performed by: STUDENT IN AN ORGANIZED HEALTH CARE EDUCATION/TRAINING PROGRAM

## 2019-07-17 PROCEDURE — 85027 COMPLETE CBC AUTOMATED: CPT | Mod: HCNC

## 2019-07-17 PROCEDURE — 25000003 PHARM REV CODE 250: Mod: HCNC | Performed by: SURGERY

## 2019-07-17 PROCEDURE — 82728 ASSAY OF FERRITIN: CPT | Mod: HCNC

## 2019-07-17 PROCEDURE — 88112 CYTOPATH CELL ENHANCE TECH: CPT | Mod: 26,HCNC,, | Performed by: PATHOLOGY

## 2019-07-17 PROCEDURE — 85730 THROMBOPLASTIN TIME PARTIAL: CPT | Mod: HCNC

## 2019-07-17 PROCEDURE — 85610 PROTHROMBIN TIME: CPT | Mod: HCNC

## 2019-07-17 PROCEDURE — 88305 TISSUE EXAM BY PATHOLOGIST: CPT | Mod: HCNC | Performed by: PATHOLOGY

## 2019-07-17 PROCEDURE — 99900035 HC TECH TIME PER 15 MIN (STAT): Mod: HCNC

## 2019-07-17 PROCEDURE — 88305 CYTOLOGY SPECIMEN- MEDICAL CYTOLOGY (FLUID/WASH/BRUSH): ICD-10-PCS | Mod: 26,HCNC,, | Performed by: PATHOLOGY

## 2019-07-17 PROCEDURE — 11000001 HC ACUTE MED/SURG PRIVATE ROOM: Mod: HCNC

## 2019-07-17 PROCEDURE — 83880 ASSAY OF NATRIURETIC PEPTIDE: CPT | Mod: HCNC

## 2019-07-17 PROCEDURE — 87205 SMEAR GRAM STAIN: CPT | Mod: HCNC

## 2019-07-17 PROCEDURE — 94664 DEMO&/EVAL PT USE INHALER: CPT | Mod: HCNC

## 2019-07-17 PROCEDURE — 93010 ELECTROCARDIOGRAM REPORT: CPT | Mod: HCNC,,, | Performed by: INTERNAL MEDICINE

## 2019-07-17 PROCEDURE — 84443 ASSAY THYROID STIM HORMONE: CPT | Mod: HCNC

## 2019-07-17 PROCEDURE — 83735 ASSAY OF MAGNESIUM: CPT | Mod: HCNC

## 2019-07-17 PROCEDURE — 83615 LACTATE (LD) (LDH) ENZYME: CPT | Mod: HCNC

## 2019-07-17 PROCEDURE — 83540 ASSAY OF IRON: CPT | Mod: HCNC

## 2019-07-17 PROCEDURE — 87206 SMEAR FLUORESCENT/ACID STAI: CPT | Mod: 91,HCNC

## 2019-07-17 PROCEDURE — 93010 EKG 12-LEAD: ICD-10-PCS | Mod: HCNC,,, | Performed by: INTERNAL MEDICINE

## 2019-07-17 PROCEDURE — 85018 HEMOGLOBIN: CPT | Mod: HCNC

## 2019-07-17 PROCEDURE — 99291 CRITICAL CARE FIRST HOUR: CPT | Mod: 25,HCNC

## 2019-07-17 PROCEDURE — 88112 CYTOLOGY SPECIMEN- MEDICAL CYTOLOGY (FLUID/WASH/BRUSH): ICD-10-PCS | Mod: 26,HCNC,, | Performed by: PATHOLOGY

## 2019-07-17 PROCEDURE — 32555 ASPIRATE PLEURA W/ IMAGING: CPT | Mod: HCNC

## 2019-07-17 PROCEDURE — 88305 TISSUE EXAM BY PATHOLOGIST: CPT | Mod: 26,HCNC,, | Performed by: PATHOLOGY

## 2019-07-17 PROCEDURE — 27000646 HC AEROBIKA DEVICE: Mod: HCNC

## 2019-07-17 PROCEDURE — 82607 VITAMIN B-12: CPT | Mod: HCNC

## 2019-07-17 PROCEDURE — 81000 URINALYSIS NONAUTO W/SCOPE: CPT | Mod: HCNC

## 2019-07-17 PROCEDURE — 84157 ASSAY OF PROTEIN OTHER: CPT | Mod: HCNC

## 2019-07-17 PROCEDURE — 82746 ASSAY OF FOLIC ACID SERUM: CPT | Mod: HCNC

## 2019-07-17 PROCEDURE — 36415 COLL VENOUS BLD VENIPUNCTURE: CPT | Mod: HCNC

## 2019-07-17 PROCEDURE — 87070 CULTURE OTHR SPECIMN AEROBIC: CPT | Mod: HCNC

## 2019-07-17 PROCEDURE — 84100 ASSAY OF PHOSPHORUS: CPT | Mod: HCNC

## 2019-07-17 PROCEDURE — 94799 UNLISTED PULMONARY SVC/PX: CPT | Mod: HCNC

## 2019-07-17 PROCEDURE — 94761 N-INVAS EAR/PLS OXIMETRY MLT: CPT | Mod: HCNC

## 2019-07-17 RX ORDER — DIAZEPAM 2 MG/1
2 TABLET ORAL DAILY
Status: DISCONTINUED | OUTPATIENT
Start: 2019-07-17 | End: 2019-07-18

## 2019-07-17 RX ORDER — SODIUM CHLORIDE 9 MG/ML
500 INJECTION, SOLUTION INTRAVENOUS
Status: ACTIVE | OUTPATIENT
Start: 2019-07-17 | End: 2019-07-17

## 2019-07-17 RX ORDER — FERROUS SULFATE, DRIED 160(50) MG
1 TABLET, EXTENDED RELEASE ORAL 2 TIMES DAILY WITH MEALS
Status: DISCONTINUED | OUTPATIENT
Start: 2019-07-17 | End: 2019-07-17

## 2019-07-17 RX ORDER — CIPROFLOXACIN 2 MG/ML
400 INJECTION, SOLUTION INTRAVENOUS ONCE
Status: DISCONTINUED | OUTPATIENT
Start: 2019-07-17 | End: 2019-07-17

## 2019-07-17 RX ORDER — LIDOCAINE HYDROCHLORIDE 10 MG/ML
10 INJECTION INFILTRATION; PERINEURAL ONCE
Status: COMPLETED | OUTPATIENT
Start: 2019-07-17 | End: 2019-07-17

## 2019-07-17 RX ORDER — FLUTICASONE FUROATE AND VILANTEROL 200; 25 UG/1; UG/1
1 POWDER RESPIRATORY (INHALATION) DAILY
Status: DISCONTINUED | OUTPATIENT
Start: 2019-07-17 | End: 2019-07-19 | Stop reason: HOSPADM

## 2019-07-17 RX ORDER — CIPROFLOXACIN 2 MG/ML
400 INJECTION, SOLUTION INTRAVENOUS
Status: DISCONTINUED | OUTPATIENT
Start: 2019-07-17 | End: 2019-07-19 | Stop reason: HOSPADM

## 2019-07-17 RX ORDER — CALCIUM CARBONATE 200(500)MG
500 TABLET,CHEWABLE ORAL 2 TIMES DAILY WITH MEALS
Status: DISCONTINUED | OUTPATIENT
Start: 2019-07-17 | End: 2019-07-19 | Stop reason: HOSPADM

## 2019-07-17 RX ORDER — VANCOMYCIN HCL IN 5 % DEXTROSE 1G/250ML
1000 PLASTIC BAG, INJECTION (ML) INTRAVENOUS ONCE
Status: DISCONTINUED | OUTPATIENT
Start: 2019-07-18 | End: 2019-07-17

## 2019-07-17 RX ORDER — SODIUM CHLORIDE 0.9 % (FLUSH) 0.9 %
10 SYRINGE (ML) INJECTION
Status: DISCONTINUED | OUTPATIENT
Start: 2019-07-17 | End: 2019-07-19 | Stop reason: HOSPADM

## 2019-07-17 RX ORDER — VANCOMYCIN HCL IN 5 % DEXTROSE 1G/250ML
1000 PLASTIC BAG, INJECTION (ML) INTRAVENOUS
Status: DISCONTINUED | OUTPATIENT
Start: 2019-07-18 | End: 2019-07-17

## 2019-07-17 RX ORDER — DEXTROSE MONOHYDRATE, SODIUM CHLORIDE, AND POTASSIUM CHLORIDE 50; 1.49; 4.5 G/1000ML; G/1000ML; G/1000ML
INJECTION, SOLUTION INTRAVENOUS ONCE
Status: COMPLETED | OUTPATIENT
Start: 2019-07-17 | End: 2019-07-17

## 2019-07-17 RX ORDER — IPRATROPIUM BROMIDE AND ALBUTEROL SULFATE 2.5; .5 MG/3ML; MG/3ML
3 SOLUTION RESPIRATORY (INHALATION) 3 TIMES DAILY PRN
Status: DISCONTINUED | OUTPATIENT
Start: 2019-07-17 | End: 2019-07-19 | Stop reason: HOSPADM

## 2019-07-17 RX ORDER — HYDROCODONE BITARTRATE AND ACETAMINOPHEN 5; 325 MG/1; MG/1
1 TABLET ORAL ONCE
Status: COMPLETED | OUTPATIENT
Start: 2019-07-17 | End: 2019-07-17

## 2019-07-17 RX ORDER — CIPROFLOXACIN 2 MG/ML
400 INJECTION, SOLUTION INTRAVENOUS
Status: DISCONTINUED | OUTPATIENT
Start: 2019-07-18 | End: 2019-07-17

## 2019-07-17 RX ORDER — HYDROCODONE BITARTRATE AND ACETAMINOPHEN 10; 325 MG/1; MG/1
1 TABLET ORAL ONCE
Status: COMPLETED | OUTPATIENT
Start: 2019-07-17 | End: 2019-07-17

## 2019-07-17 RX ORDER — SODIUM BICARBONATE 650 MG/1
650 TABLET ORAL 3 TIMES DAILY
Status: DISCONTINUED | OUTPATIENT
Start: 2019-07-17 | End: 2019-07-19 | Stop reason: HOSPADM

## 2019-07-17 RX ORDER — VANCOMYCIN HCL IN 5 % DEXTROSE 1G/250ML
1000 PLASTIC BAG, INJECTION (ML) INTRAVENOUS
Status: DISCONTINUED | OUTPATIENT
Start: 2019-07-17 | End: 2019-07-19 | Stop reason: HOSPADM

## 2019-07-17 RX ADMIN — SODIUM BICARBONATE 650 MG TABLET 650 MG: at 09:07

## 2019-07-17 RX ADMIN — DEXTROSE, SODIUM CHLORIDE, AND POTASSIUM CHLORIDE: 5; .45; .15 INJECTION INTRAVENOUS at 12:07

## 2019-07-17 RX ADMIN — HYDROCODONE BITARTRATE AND ACETAMINOPHEN 1 TABLET: 10; 325 TABLET ORAL at 12:07

## 2019-07-17 RX ADMIN — CALCIUM CARBONATE (ANTACID) CHEW TAB 500 MG 500 MG: 500 CHEW TAB at 04:07

## 2019-07-17 RX ADMIN — HYDROCODONE BITARTRATE AND ACETAMINOPHEN 1 TABLET: 5; 325 TABLET ORAL at 11:07

## 2019-07-17 RX ADMIN — LIDOCAINE HYDROCHLORIDE 10 ML: 10 INJECTION, SOLUTION INFILTRATION; PERINEURAL at 11:07

## 2019-07-17 RX ADMIN — DIAZEPAM 2 MG: 2 TABLET ORAL at 12:07

## 2019-07-17 RX ADMIN — SODIUM BICARBONATE 650 MG TABLET 650 MG: at 04:07

## 2019-07-17 NOTE — PLAN OF CARE
Pt arrived to unit from ED. AAOx4. Pt oriented to room and explained VN role. Plan of care reviewed with patient. Call light within reach, fall precautions maintained, bed alarm set, on 3L O2, telemetry monitor applied. Patient aware. Nurse instructed patient to call if needs assistance. Patient verbalized complete understanding. NAD noted. Will continue to monitor and continue plan of care.

## 2019-07-17 NOTE — PROCEDURES
"Katie Quevedo is a 74 y.o. female patient.    Temp: 98 °F (36.7 °C) (19)  Pulse: 101 (19 131)  Resp: (!) 35 (19)  BP: 124/63 (19 1202)  SpO2: 95 % (19)  Weight: 61.7 kg (136 lb) (19)  Height: 5' 2.25" (158.1 cm) (19)       Thoracentesis  Date/Time: 2019 1:36 PM  Location procedure was performed: [facility] PULMONARY MEDICINE  Performed by: Jessica Joseph MD  Authorized by: Jessica Joseph MD   Assisting provider: Gavin Ac  Pre-operative diagnosis: pleural effusion  Post-operative diagnosis: pleural effuison  Consent Done: Yes  Consent: Written consent obtained.  Consent given by: patient  Patient identity confirmed: , MRN and name  Time out: Immediately prior to procedure a "time out" was called to verify the correct patient, procedure, equipment, support staff and site/side marked as required.  Procedure purpose: diagnostic and therapeutic  Indications: pleural effusion  Preparation: Patient was prepped and draped in the usual sterile fashion.  Local anesthesia used: yes  Anesthesia: local infiltration    Anesthesia:  Local anesthesia used: yes  Local Anesthetic: lidocaine 1% without epinephrine  Anesthetic total: 10 mL  Patient sedated: no  Description of findings: clear yellow fluid   Preparation: skin prepped with ChloraPrep  Patient position: sitting  Ultrasound guidance: yes  Location: left posterior  Intercostal space: 5th  Puncture method: over-the-needle catheter  Needle size: 16  Catheter size: 16 gauge  Number of attempts: 1  Drainage amount: 600 ml  Drainage characteristics: cloudy  Complications: No  Estimated blood loss (mL): 5  Specimens: No  Implants: No          Jessica Joseph  2019  "

## 2019-07-17 NOTE — HPI
Ms. Quevedo is a 75 yo F with COPD on oxygen at home 2L that presents with recurrent pleural effusion and severe shortness of breath. She was admitted 3 weeks ago and had a thoracentesis that was lymphocytic and bloody, cytology was negative. No lung nodules or cancer history. No rheumatologic diseases.

## 2019-07-17 NOTE — CARE UPDATE
Chart reviewed.   Cardiology consulted for pericardial effusion.   TTE:  · Normal left ventricular systolic function. The estimated ejection fraction is 55%  · Grade II (moderate) left ventricular diastolic dysfunction consistent with pseudonormalization.  · Elevated left atrial pressure.  · Normal right ventricular systolic function.  · Normal central venous pressure (3 mm Hg).  · There is a large left pleural effusion.  · Small pericardial effusion. No tamponade. No RA or RV collapse is seen. There is >30% Mitral inflow velocity variation with respiration.    Two weeks ago patient underwent thoracentesis that improved her breathing x 1 week. She was noted to have a reaccumulation of left sided pleural fluid. Bedside thoracentesis was performed by Pulmonology and she is scheduled for VATS tomorrow.     No indication for pericardiocentesis.   EKG NSR without acute ischemic changes   Troponin negative.   Hemodynamically stable

## 2019-07-17 NOTE — ED PROVIDER NOTES
Encounter Date: 7/17/2019    SCRIBE #1 NOTE: I, Katie Camacho, am scribing for, and in the presence of,  Dr. Green. I have scribed the entire note.       History     Chief Complaint   Patient presents with    Shortness of Breath     74 year old female presents to ed cc of sob x 2 weeks. patient wears o2 at home which is not helping      Katie Quevedo is a 74 y.o. female who  has a past medical history of Anemia, Bilateral renal cysts, Cataract, Chronic LBP (7/26/2012), Chronic pain, CKD (chronic kidney disease), stage V, COPD (chronic obstructive pulmonary disease), Dehydration, Encounter for blood transfusion, HTN (hypertension), Lumbar spondylosis, Melanoma, Metabolic bone disease, Migraines, neuralgic, Osteoporosis, Primary osteoarthritis of both knees, Pulmonary embolism with infarction, Seizures, Subdeltoid bursitis, L>R. (9/27/2012), Ulcer, and Vitamin D deficiency disease.    The patient presents to the ED due to shortness of breath that started 2 weeks ago. She reports her shortness of breath has gradually worsened since being discharged from the hospital. She is currently on 2 liters of oxygen at home, but has been feeling worse despite using her O2.   She reports a history of pleural effusion that required drainage while in the hospital during her last admission.   She was scheduled to f/u with Pulmonology today, but felt SOB, so was referred to the ED.  She denies any cough, fever, CP, N/V, abdominal pain, or urinary complaints.     The history is provided by the patient.     Review of patient's allergies indicates:   Allergen Reactions    Aspirin      Other reaction(s): hx of ulcers    Tetracycline Swelling     Other reaction(s): Swelling    Penicillins Rash     Other reaction(s): Hives  Other reaction(s): Rash  Other reaction(s): Rash  Other reaction(s): Hives     Past Medical History:   Diagnosis Date    Anemia     Bilateral renal cysts     Cataract     Chronic LBP 7/26/2012    Chronic pain      CKD (chronic kidney disease), stage V     COPD (chronic obstructive pulmonary disease)     Dehydration     Encounter for blood transfusion     HTN (hypertension)     Lumbar spondylosis     Melanoma     of the lip    Metabolic bone disease     Migraines, neuralgic     Osteoporosis     Primary osteoarthritis of both knees     s/p Rt TKA    Pulmonary embolism with infarction     Seizures     Subdeltoid bursitis, L>R. 9/27/2012    Ulcer     Vitamin D deficiency disease      Past Surgical History:   Procedure Laterality Date    BLADDER SUSPENSION      CATARACT EXTRACTION  11/18/13    left eye    CERVICAL LAMINECTOMY      x3, fusion x1    COLONOSCOPY  2009    HYSTERECTOMY      INSERTION, IOL PROSTHESIS Left 11/18/2013    [facility]    INSERTION, IOL PROSTHESIS Right 10/28/2013    [facility]    JOINT REPLACEMENT  2001    total right knee     LUMBAR LAMINECTOMY      x 3, fusion x1    OOPHORECTOMY      PHACOEMULSIFICATION, CATARACT Left 11/18/2013    [facility]    PHACOEMULSIFICATION, CATARACT Right 10/28/2013    [facility]     Family History   Problem Relation Age of Onset    Arthritis Mother     Stroke Mother     Hypertension Father     Cancer Father     Cataracts Father     Diabetes Maternal Aunt     Hypertension Maternal Grandfather     Heart disease Maternal Grandfather     Heart attack Maternal Grandfather     Cataracts Sister     Glaucoma Cousin      Social History     Tobacco Use    Smoking status: Former Smoker     Types: Cigarettes    Smokeless tobacco: Never Used   Substance Use Topics    Alcohol use: Yes     Comment: Rare    Drug use: No     Review of Systems   Constitutional: Negative for chills and fever.   HENT: Negative for ear pain and sore throat.    Eyes: Negative for redness.   Respiratory: Positive for shortness of breath. Negative for  cough, choking, chest tightness and wheezing.    Cardiovascular: Negative for chest pain.   Gastrointestinal: Negative for abdominal pain, diarrhea, nausea and vomiting.   Genitourinary: Negative for dysuria, frequency and urgency.   Musculoskeletal: Negative for back pain.   Skin: Negative for rash and wound.   Neurological: Negative for syncope, weakness and headaches.   Hematological: Does not bruise/bleed easily.   Psychiatric/Behavioral: Negative for agitation, behavioral problems and confusion.       Physical Exam     Initial Vitals [07/17/19 0815]   BP Pulse Resp Temp SpO2   128/61 101 (!) 22 98 °F (36.7 °C) 96 %      MAP       --         Physical Exam    Nursing note and vitals reviewed.  Constitutional: She appears well-developed and well-nourished. She is not diaphoretic. No distress.   Appears dyspneic, breathless, tachypneic on arrival to ED room.   HENT:   Head: Normocephalic and atraumatic.   Mouth/Throat: Oropharynx is clear and moist.   Eyes: Conjunctivae and EOM are normal. Pupils are equal, round, and reactive to light.   Neck: Normal range of motion. Neck supple. No tracheal deviation present.   Cardiovascular: Regular rhythm, normal heart sounds and intact distal pulses. Tachycardia present.    HR 100s.   Pulmonary/Chest: No stridor. Tachypnea noted. No respiratory distress. She has decreased breath sounds in the left middle field and the left lower field. She has no wheezes. She has no rhonchi. She has no rales.   Diminished breath sounds to left base    Abdominal: Soft. Bowel sounds are normal. She exhibits no distension and no mass. There is no tenderness.   Musculoskeletal: Normal range of motion. She exhibits no edema or tenderness.   Neurological: She is alert and oriented to person, place, and time. She has normal strength. No cranial nerve deficit or sensory deficit.   Skin: Skin is warm and dry. Capillary refill takes less than 2 seconds. No pallor.   Psychiatric: She has a normal mood  and affect. Her behavior is normal. Thought content normal.         ED Course   Critical Care  Date/Time: 7/17/2019 9:05 AM  Performed by: Roland Green MD  Authorized by: Roland Green MD   Direct patient critical care time: 15 minutes  Additional history critical care time: 10 minutes  Ordering / reviewing critical care time: 5 minutes  Documentation critical care time: 5 minutes  Consulting other physicians critical care time: 5 minutes  Consult with family critical care time: 5 minutes  Total critical care time (exclusive of procedural time) : 45 minutes  Critical care time was exclusive of separately billable procedures and treating other patients and teaching time.  Critical care was necessary to treat or prevent imminent or life-threatening deterioration of the following conditions: circulatory failure (pericardial effusion with tamponade).  Critical care was time spent personally by me on the following activities: development of treatment plan with patient or surrogate, discussions with consultants, interpretation of cardiac output measurements, evaluation of patient's response to treatment, examination of patient, obtaining history from patient or surrogate, ordering and performing treatments and interventions, ordering and review of laboratory studies, ordering and review of radiographic studies, pulse oximetry, re-evaluation of patient's condition and review of old charts.        Labs Reviewed   CBC WITHOUT DIFFERENTIAL - Abnormal; Notable for the following components:       Result Value    Hemoglobin 11.4 (*)     Mean Corpuscular Hemoglobin Conc 30.7 (*)     All other components within normal limits   COMPREHENSIVE METABOLIC PANEL - Abnormal; Notable for the following components:    Sodium 135 (*)     CO2 20 (*)     Creatinine 2.0 (*)     ALT 9 (*)     eGFR if  27 (*)     eGFR if non  24 (*)     All other components within normal limits   URINALYSIS, REFLEX TO URINE  CULTURE - Abnormal; Notable for the following components:    Leukocytes, UA 1+ (*)     All other components within normal limits    Narrative:     Preferred Collection Type->Urine, Clean Catch   B-TYPE NATRIURETIC PEPTIDE - Abnormal; Notable for the following components:     (*)     All other components within normal limits   URINALYSIS MICROSCOPIC - Abnormal; Notable for the following components:    WBC, UA 6 (*)     Non-Squam Epith 2 (*)     All other components within normal limits    Narrative:     Preferred Collection Type->Urine, Clean Catch   AFB CULTURE & SMEAR   DIRECT AFB STAIN   GRAM STAIN   CULTURE, BODY FLUID - BACTEC   TROPONIN I   WBC & DIFF,BODY FLUID   PROTEIN, PERITONEAL, PLEURAL FLUID OR HOMERO DRAINAGE, IN-HOUSE   LDH, PERITONEAL, PLEURAL FLUID OR HOMERO DRAINAGE, IN-HOUSE   IRON AND TIBC   FERRITIN   VITAMIN B12   FOLATE   HEMOGLOBIN A1C   MAGNESIUM   PHOSPHORUS   TSH   LIPID PANEL   PROTIME-INR   APTT   CYTOLOGY SPECIMEN- MEDICAL CYTOLOGY (FLUID/WASH/BRUSH)     EKG Readings: (Independently Interpreted)   Normal Sinus Rhythm rate of 99 bpm. No ST changes. No ischemia. Normal intervals. Low voltage QRS.   Grossly stable from 06/2019     ECG Results          EKG 12-lead (In process)  Result time 07/17/19 10:11:55    In process by Interface, Lab In [facility] (07/17/19 10:11:55)                 Narrative:    Test Reason : R06.02,    Vent. Rate : 099 BPM     Atrial Rate : 099 BPM     P-R Int : 174 ms          QRS Dur : 058 ms      QT Int : 324 ms       P-R-T Axes : 068 095 082 degrees     QTc Int : 415 ms    Normal sinus rhythm  Rightward axis  Low voltage QRS  Cannot rule out Anteroseptal infarct (cited on or before 19-JUN-2019)  Abnormal ECG  When compared with ECG of 19-JUN-2019 19:48,  No significant change was found    Referred By: [email or web address]   SELF           Confirmed By:                             Imaging Results          X-Ray Chest AP Portable (Final result)  Result time 07/17/19 08:39:30     Final result by Denilson Patel MD (07/17/19 08:39:30)                 Impression:      As above.      Electronically signed by: Denilson Patel  Date:    07/17/2019  Time:    08:39             Narrative:    EXAMINATION:  XR CHEST AP PORTABLE    CLINICAL HISTORY:  shortness of breath;    TECHNIQUE:  Single frontal view of the chest was performed.    COMPARISON:  06/20/2019    FINDINGS:  There is moderate-sized, left layering pleural effusion, increased in the interval.  Left upper lung zone and right lung are relatively clear.  Cardiomediastinal silhouette is similar.  No acute osseous abnormality.                                 Medical Decision Making:   Initial Assessment:   74-year-old female with history of left pleural effusion requiring thoracentesis during last admission presents to ED due to gradually worsening shortness of breath since discharge from the hospital.  Breath sounds diminished at the left middle and left lower lung fields.  Will perform bedside ultrasound, obtain cardiac evaluation, basic labs, CXR, and continue to closely monitor.  Differential Diagnosis:   Differential Diagnosis includes, but is not limited to:  PE, MI/ACS, pneumothorax, pericardial effusion/tamonade, pneumonia, lung abscess, pericarditis/myocarditis, pleural effusion, lung mass, CHF exacerbation, asthma exacerbation, COPD exacerbation, aspirated/ingested foreign body, airway obstruction, CO poisoning, anemia, metabolic derangement, allergy/atopy, influenza, viral URI, viral syndrome.    Independently Interpreted Test(s):   I have ordered and independently interpreted EKG Reading(s) - see prior notes  Clinical Tests:   Lab Tests: Ordered and Reviewed  Radiological Study: Ordered and Reviewed  Medical Tests: Ordered and Reviewed  ED Management:  Bedside ultrasound concerning for pericardial effusion.  Cardiology consulted, will evaluated bedside.  Pulmonology aware of patient on arrival, at bedside for evaluation  shortly after patient arrived to ED.  Patient was consented for thoracentesis.    CXR revealed recurrent, moderately sized left pleural effusion.   Patient will be admitted to [facility] for further evaluation and management.  Pulmonology and General surgery to follow along while inpatient for further management of patient's recurrent left pleural effusion.  Cardiology recommended no emergent cardiac intervention at this time.    Upon re-evaluation, the patient's status has remained stable.  At this time, I believe the patient should be admitted to the hospital for further evaluation and management of L pleural effusion.  [facility] service was contacted and the case was discussed.   The consulting physician/team agrees with plan and will admit under their service.   The patient and family were updated with test results, overall impression, and further plan of care. All questions were answered. The patient expressed understanding and agrees with the current plan.                        Clinical Impression:       ICD-10-CM ICD-9-CM   1. Pleural effusion on left J90 511.9   2. Shortness of breath R06.02 786.05   3. Pericardial effusion I31.3 423.9   4. COPD (chronic obstructive pulmonary disease) with chronic bronchitis J44.9 491.20     Disposition:   Disposition: Admitted  Condition: Stable        I, Dr. Roland Green, personally performed the services described in this documentation. All medical record entries made by the scribe were at my direction and in my presence.  I have reviewed the chart and agree that the record reflects my personal performance and is accurate and complete.     Roland Green MD.  11:49 AM 07/17/2019         Roland Green MD  07/17/19 1153

## 2019-07-17 NOTE — ED TRIAGE NOTES
Pt presents to ed with increased SOB, pt wears home 02 at 2 liters all the time, former smoker, pt presents to ed pursed lip breathing with room air sats at 94%, pt placed on 2 liters NC with increase in sats to 96-98%, pt aaox3,  at bedside and aware of plan of care to be admitted

## 2019-07-17 NOTE — CONSULTS
Surgery Consult Note    Consult Note      History of present illness:  Patient is a 73yo female with PMHx of HTN, COPD on 2L NC at home, Degenerative Bone Disease, and Stage IV CKD who presents to the ED with complaints of SOB. Patient states 2 weeks ago she had a thoracentesis that improved her breathing temporarily. She started having increased SOB 1 week after the thoracentesis. She is chronically on 2L NC at home for COPD, but this SOB is worse than usual. She denies chest pain, abdominal pain, constipation, diarrhea, or urinary symptoms. She denies decrease in appetite or difficulty eating. She has baseline back and neck pain.     In the ED, she was found to have a reaccumulation of left sided pleural fluid. Bedside thoracentesis was performed by Pulmonology. Cardiology was consulted for possible pericardial fluid.     Past Medical History:   Diagnosis Date    Anemia     Bilateral renal cysts     Cataract     Chronic LBP 7/26/2012    Chronic pain     CKD (chronic kidney disease), stage V     COPD (chronic obstructive pulmonary disease)     Dehydration     Encounter for blood transfusion     HTN (hypertension)     Lumbar spondylosis     Melanoma     of the lip    Metabolic bone disease     Migraines, neuralgic     Osteoporosis     Primary osteoarthritis of both knees     s/p Rt TKA    Pulmonary embolism with infarction     Seizures     Subdeltoid bursitis, L>R. 9/27/2012    Ulcer     Vitamin D deficiency disease      Past Surgical History:   Procedure Laterality Date    BLADDER SUSPENSION      CATARACT EXTRACTION  11/18/13    left eye    CERVICAL LAMINECTOMY      x3, fusion x1    COLONOSCOPY  2009    HYSTERECTOMY      INSERTION, IOL PROSTHESIS Left 11/18/2013    [facility]    INSERTION, IOL PROSTHESIS Right 10/28/2013    [facility]    JOINT REPLACEMENT  2001    total right knee     LUMBAR LAMINECTOMY      x 3, fusion x1     OOPHORECTOMY      PHACOEMULSIFICATION, CATARACT Left 11/18/2013    [facility]    PHACOEMULSIFICATION, CATARACT Right 10/28/2013    [facility]     Family History   Problem Relation Age of Onset    Arthritis Mother     Stroke Mother     Hypertension Father     Cancer Father     Cataracts Father     Diabetes Maternal Aunt     Hypertension Maternal Grandfather     Heart disease Maternal Grandfather     Heart attack Maternal Grandfather     Cataracts Sister     Glaucoma Cousin      Social History     Tobacco Use    Smoking status: Former Smoker     Types: Cigarettes    Smokeless tobacco: Never Used   Substance Use Topics    Alcohol use: Yes     Comment: Rare    Drug use: No     Review of patient's allergies indicates:   Allergen Reactions    Aspirin      Other reaction(s): hx of ulcers    Tetracycline Swelling     Other reaction(s): Swelling    Penicillins Rash     Other reaction(s): Hives  Other reaction(s): Rash  Other reaction(s): Rash  Other reaction(s): Hives     Review of systems: see HPI; otherwise, 12-point ROS negative.     Temp:  [98 °F (36.7 °C)] 98 °F (36.7 °C)  Pulse:  [] 101  Resp:  [22-36] 35  SpO2:  [92 %-96 %] 95 %  BP: (124-146)/(61-79) 124/63    Gen: no acute distress. Alert and oriented x3. Non-toxic appearing.   HEENT: normocephalic and atraumatic. EOMI. Moist mucous membranes. Trachea midline.   Neck: supple. Normal ROM.  Resp: Tachypneic on 1.5L NC. Decreased breath sounds left side.   CV: regular rate.  Abd: soft, nondistended, nontender.   Ext: warm and well perfused. No clubbing, cyanosis, or edema.  Neuro: CN grossly intact. No focal neurological deficits.      Labs  Recent Labs   Lab 07/17/19  0839   WBC 8.80   RBC 4.05   HGB 11.4*   HCT 37.1      MCV 92   MCH 28.1   MCHC 30.7*     Recent Labs   Lab 07/17/19  0839   CALCIUM 10.2   PROT 7.9   *   K 4.1   CO2 20*      BUN 13    CREATININE 2.0*   ALKPHOS 87   ALT 9*   AST 22   BILITOT 0.5       Imaging  Imaging Results          X-Ray Chest AP Portable (Final result)  Result time 07/17/19 08:39:30    Final result by Denilson Patel MD (07/17/19 08:39:30)                 Impression:      As above.      Electronically signed by: Denilson Patel  Date:    07/17/2019  Time:    08:39             Narrative:    EXAMINATION:  XR CHEST AP PORTABLE    CLINICAL HISTORY:  shortness of breath;    TECHNIQUE:  Single frontal view of the chest was performed.    COMPARISON:  06/20/2019    FINDINGS:  There is moderate-sized, left layering pleural effusion, increased in the interval.  Left upper lung zone and right lung are relatively clear.  Cardiomediastinal silhouette is similar.  No acute osseous abnormality.                                  Assessment and plan:    -Plan for VATS with pleural biopsy and potential pleurodesis on 7/18  -Concern for possible pulmonary malignancy given re-accumulation of fluid  -Hibiclens head to toe once tonight and once in AM  -Incentive spirometry and chest physiotherapy pre and post operatively  -NPO at midnight, hold anticoagulation  -Cipro and Vanc dosed immediately before   -Recommend cardiology for risk stratification      Case d/w staff.     Rose Patrick MD  PGY-2  [employer] General Surgery

## 2019-07-17 NOTE — PLAN OF CARE
The floor nurse requested the VN to contact the doctor.  The patient is request to have a diet put in. VN spoke to the team and they will put an order in for a diet.

## 2019-07-17 NOTE — PROGRESS NOTES
Asked to see patient by Dr Caban for VATS/pleural Bx./pleurodesis.    73 y/o F with COPD, oxygen dependent, with recurrent L pleural  effusions. Multiple medical co-morbidities. Stage V CKD, pericardial effusion, etc.  Patient examined.    Scans /xrays/path reviewed. Cytology non diagnostic from prior thoracentesis.    Dyspneic on oxygen at bedside awaiting thoracentesis for comfort.  Full consult to follow.  Recurring effusion, possible malignant etiology.  Needs VATS for  Pleural biopsy and possible pleurodesis.    Long D/W patient and spouse.    Moderate risk for prolonged ventilation postop.  Risks/benefits explained and accepted.  All questions answered.

## 2019-07-17 NOTE — SUBJECTIVE & OBJECTIVE
Past Medical History:   Diagnosis Date    Anemia     Bilateral renal cysts     Cataract     Chronic LBP 7/26/2012    Chronic pain     CKD (chronic kidney disease), stage V     COPD (chronic obstructive pulmonary disease)     Dehydration     Encounter for blood transfusion     HTN (hypertension)     Lumbar spondylosis     Melanoma     of the lip    Metabolic bone disease     Migraines, neuralgic     Osteoporosis     Primary osteoarthritis of both knees     s/p Rt TKA    Pulmonary embolism with infarction     Seizures     Subdeltoid bursitis, L>R. 9/27/2012    Ulcer     Vitamin D deficiency disease        Past Surgical History:   Procedure Laterality Date    BLADDER SUSPENSION      CATARACT EXTRACTION  11/18/13    left eye    CERVICAL LAMINECTOMY      x3, fusion x1    COLONOSCOPY  2009    HYSTERECTOMY      INSERTION, IOL PROSTHESIS Left 11/18/2013    [facility]    INSERTION, IOL PROSTHESIS Right 10/28/2013    [facility]    JOINT REPLACEMENT  2001    total right knee     LUMBAR LAMINECTOMY      x 3, fusion x1    OOPHORECTOMY      PHACOEMULSIFICATION, CATARACT Left 11/18/2013    [facility]    PHACOEMULSIFICATION, CATARACT Right 10/28/2013    [facility]       Review of patient's allergies indicates:   Allergen Reactions    Aspirin      Other reaction(s): hx of ulcers    Tetracycline Swelling     Other reaction(s): Swelling    Penicillins Rash     Other reaction(s): Hives  Other reaction(s): Rash  Other reaction(s): Rash  Other reaction(s): Hives       Family History     Problem Relation (Age of Onset)    Arthritis Mother    Cancer Father    Cataracts Father, Sister    Diabetes Maternal Aunt    Glaucoma Cousin    Heart attack Maternal Grandfather    Heart disease Maternal Grandfather    Hypertension Father, Maternal Grandfather    Stroke Mother        Tobacco Use     Smoking status: Former Smoker     Types: Cigarettes    Smokeless tobacco: Never Used   Substance and Sexual Activity    Alcohol use: Yes     Alcohol/week: 0.6 oz     Types: 1 Glasses of wine per week     Comment: Rare    Drug use: No    Sexual activity: Never         Review of Systems   Constitutional: Positive for activity change and fatigue. Negative for chills and fever.   HENT: Positive for congestion. Negative for ear pain, facial swelling and hearing loss.    Eyes: Negative.    Respiratory: Positive for cough, chest tightness and shortness of breath. Negative for wheezing.    Cardiovascular: Negative for chest pain, palpitations and leg swelling.   Endocrine: Negative for cold intolerance, heat intolerance and polydipsia.   Genitourinary: Negative for difficulty urinating, dysuria, enuresis and flank pain.   Musculoskeletal: Positive for back pain. Negative for myalgias.   Skin: Negative.    Allergic/Immunologic: Negative.    Neurological: Negative for dizziness, seizures and speech difficulty.   Hematological: Negative.    Psychiatric/Behavioral: Negative.      Objective:     Vital Signs (Most Recent):  Temp: 98.2 °F (36.8 °C) (07/17/19 1527)  Pulse: 98 (07/17/19 1527)  Resp: (!) 26 (07/17/19 1500)  BP: 129/63 (07/17/19 1527)  SpO2: 97 % (07/17/19 1527) Vital Signs (24h Range):  Temp:  [98 °F (36.7 °C)-98.2 °F (36.8 °C)] 98.2 °F (36.8 °C)  Pulse:  [] 98  Resp:  [22-36] 26  SpO2:  [92 %-97 %] 97 %  BP: (124-146)/(61-79) 129/63     Weight: (!) 138 kg (304 lb 3.8 oz)  Body mass index is 55.2 kg/m².    No intake or output data in the 24 hours ending 07/17/19 1555    Physical Exam   Constitutional: She is oriented to person, place, and time. She appears well-developed and well-nourished.   HENT:   Head: Normocephalic and atraumatic.   Eyes: Pupils are equal, round, and reactive to light. EOM are normal.   Neck: Normal range of motion. Neck supple.   Cardiovascular: Normal rate, regular rhythm and  normal heart sounds.   No murmur heard.  Pulmonary/Chest: Accessory muscle usage present. Tachypnea noted. She is in respiratory distress. She has decreased breath sounds in the left lower field.   Musculoskeletal: Normal range of motion.   Neurological: She is alert and oriented to person, place, and time.   Skin: Skin is warm and dry. Capillary refill takes less than 2 seconds.   Psychiatric: She has a normal mood and affect.   Nursing note and vitals reviewed.      Vents:       Lines/Drains/Airways     Peripheral Intravenous Line                 Peripheral IV - Single Lumen 06/19/19 1945 20 G Left Antecubital 27 days                Significant Labs:    CBC/Anemia Profile:  Recent Labs   Lab 07/17/19  0839 07/17/19  0844 07/17/19  1524   WBC 8.80  --   --    HGB 11.4*  --  11.6*   HCT 37.1  --   --      --   --    MCV 92  --   --    RDW 14.3  --   --    FERRITIN  --  148  --         Chemistries:  Recent Labs   Lab 07/17/19  0839   *   K 4.1      CO2 20*   BUN 13   CREATININE 2.0*   CALCIUM 10.2   ALBUMIN 3.7   PROT 7.9   BILITOT 0.5   ALKPHOS 87   ALT 9*   AST 22   MG 2.0   PHOS 4.0     Pleural fluids- inflammatory- not lymphocytic predominant this time      Significant Imaging:   CXR with recurrent pleural effusion  Echo with minimal pericardial effusion

## 2019-07-17 NOTE — ED NOTES
Pt states improvement in work of breathing after procedure complete, pt and  at bedside aware of plan of care to await in patient bed assignment

## 2019-07-17 NOTE — H&P
[facility] Internal Medicine History and Physical - Resident Note    Admitting Team: [facility] IM Team A  Attending Physician: Rosa Maria Alexander MD  Resident: Jamey  Interns: Rose Marie    Date of Admit: 7/17/2019    Chief Complaint     Shortness of Breath x 1 week    Subjective:      History of Present Illness:  Katie Quevedo is a 74 y.o. female who  has a past medical history of Anemia, Bilateral renal cysts, Cataract, Chronic LBP (7/26/2012), Chronic pain, CKD (chronic kidney disease), stage V, COPD (chronic obstructive pulmonary disease), Dehydration, Encounter for blood transfusion, HTN (hypertension), Lumbar spondylosis, Melanoma, Metabolic bone disease, Migraines, neuralgic, Osteoporosis, Primary osteoarthritis of both knees, Pulmonary embolism with infarction, Seizures, Subdeltoid bursitis, L>R. (9/27/2012), Ulcer, and Vitamin D deficiency disease.. The patient presented to [facility] on 7/17/2019 with a primary complaint of Shortness of Breath (74 year old female presents to ed cc of sob x 2 weeks. patient wears o2 at home which is not helping )    Was admitted 3 weeks ago had pleurocentesis at that time, cytology returned negative for malignancy. Interval improvement in SOB but within the last week dyspnea returned. Pt no longer able to get up and walk to bathroom w/o feeling SOB. Was able to walk ~1/2 black previously now SOB even going 20ft Home O2 2L. Pt complains of nausea, congestion, rhinorrhea, and cough for the last day. Pt able to complete all ADLs,     Past Medical History:  Past Medical History:   Diagnosis Date    Anemia     Bilateral renal cysts     Cataract     Chronic LBP 7/26/2012    Chronic pain     CKD (chronic kidney disease), stage V     COPD (chronic obstructive pulmonary disease)     Dehydration     Encounter for blood transfusion     HTN (hypertension)     Lumbar spondylosis     Melanoma     of the lip    Metabolic bone disease     Migraines, neuralgic      Osteoporosis     Primary osteoarthritis of both knees     s/p Rt TKA    Pulmonary embolism with infarction     Seizures     Subdeltoid bursitis, L>R. 9/27/2012    Ulcer     Vitamin D deficiency disease        Past Surgical History:  Past Surgical History:   Procedure Laterality Date    BLADDER SUSPENSION      CATARACT EXTRACTION  11/18/13    left eye    CERVICAL LAMINECTOMY      x3, fusion x1    COLONOSCOPY  2009    HYSTERECTOMY      INSERTION, IOL PROSTHESIS Left 11/18/2013    [facility]    INSERTION, IOL PROSTHESIS Right 10/28/2013    [facility]    JOINT REPLACEMENT  2001    total right knee     LUMBAR LAMINECTOMY      x 3, fusion x1    OOPHORECTOMY      PHACOEMULSIFICATION, CATARACT Left 11/18/2013    [facility]    PHACOEMULSIFICATION, CATARACT Right 10/28/2013    [facility]       Allergies:  Review of patient's allergies indicates:   Allergen Reactions    Aspirin      Other reaction(s): hx of ulcers    Tetracycline Swelling     Other reaction(s): Swelling    Penicillins Rash     Other reaction(s): Hives  Other reaction(s): Rash  Other reaction(s): Rash  Other reaction(s): Hives       Home Medications:  Prior to Admission medications    Medication Sig Start Date End Date Taking? Authorizing Provider   albuterol-ipratropium (DUO-NEB) 2.5 mg-0.5 mg/3 mL nebulizer solution Take 3 mLs by nebulization 3 (three) times daily as needed for Wheezing or Shortness of Breath. Rescue 8/8/18   Kingston Verduzco MD   amLODIPine (NORVASC) 10 MG tablet Take 10 mg by mouth once daily.    Historical Provider, MD   calcium-vitamin D3 (CALCIUM 500 + D) 500 mg(1,250mg) -200 unit per tablet Take 1 tablet by mouth 2 (two) times daily with meals.    Historical Provider, MD   diazePAM (VALIUM) 2 MG tablet TAKE 1 TABLET BY MOUTH DAILY & TAKE 1 TABLET AS NEEDED 5/12/18   Historical Provider, MD    DULoxetine (CYMBALTA) 30 MG capsule Take 1 capsule (30 mg total) by mouth once daily. 6/25/19 7/25/19  Pushpa Mcmahon MD   ergocalciferol (ERGOCALCIFEROL) 50,000 unit Cap Take 1 capsule (50,000 Units total) by mouth every 7 days. 88034 units weekly - 12 weeks, then monthly. 1/15/19   Aura Diggs MD   ferrous sulfate 325 (65 FE) MG EC tablet Take 1 tablet (325 mg total) by mouth 2 (two) times daily. 12/28/18   Pallavi Sunkara, MD   fluticasone-salmeterol 500-50 mcg/dose (ADVAIR DISKUS) 500-50 mcg/dose DsDv diskus inhaler Inhale 1 puff into the lungs 2 (two) times daily. Controller 8/8/18 8/8/19  Kingston Verduzco MD   HYDROcodone-acetaminophen (NORCO)  mg per tablet Take 1 tablet by mouth 3 (three) times daily as needed. 6/27/19 7/27/19  Pushpa Mcmahon MD   ipratropium-albuterol (COMBIVENT)  mcg/actuation inhaler Inhale 1 puff into the lungs every 4 (four) hours as needed for Wheezing. Rescue 9/24/17   Bridget Nicole MD   sodium bicarbonate 650 MG tablet Take 1 tablet (650 mg total) by mouth 3 (three) times daily. 11/6/18 11/6/19  Aura Diggs MD   traZODone (DESYREL) 50 MG tablet Take 1 tablet (50 mg total) by mouth every evening. 1/28/19 1/28/20  Kingston Verduzco MD   zolpidem (AMBIEN) 5 MG Tab Take 5 mg by mouth once daily. 6/4/19   Historical Provider, MD       Family History:  Family History   Problem Relation Age of Onset    Arthritis Mother     Stroke Mother     Hypertension Father     Cancer Father     Cataracts Father     Diabetes Maternal Aunt     Hypertension Maternal Grandfather     Heart disease Maternal Grandfather     Heart attack Maternal Grandfather     Cataracts Sister     Glaucoma Cousin        Social History:  Social History     Tobacco Use    Smoking status: Former Smoker     Types: Cigarettes    Smokeless tobacco: Never Used   Substance Use Topics    Alcohol use: Yes     Comment: Rare    Drug use: No       Review of Systems:  Pertinent items  "are noted in HPI. All other systems are reviewed and are negative.       Objective:   Last 24 Hour Vital Signs:  BP  Min: 128/61  Max: 146/79  Temp  Av °F (36.7 °C)  Min: 98 °F (36.7 °C)  Max: 98 °F (36.7 °C)  Pulse  Av.6  Min: 95  Max: 107  Resp  Av.5  Min: 22  Max: 35  SpO2  Av.5 %  Min: 92 %  Max: 96 %  Height  Av' 2.25" (158.1 cm)  Min: 5' 2.25" (158.1 cm)  Max: 5' 2.25" (158.1 cm)  Weight  Av.7 kg (136 lb)  Min: 61.7 kg (136 lb)  Max: 61.7 kg (136 lb)  Body mass index is 24.68 kg/m².  No intake/output data recorded.    Physical Examination:  General: Alert, Awake, Oriented x 3, uncomfortable appearing elderly woman  Head: normocephalic, atraumatic  Eyes: PERRL, EOMI, no scleral icterus, no conjunctival pallor  Nose: no tenderness to palpation, no drainage or erythema appreciated  Mouth and Throat: nl dentition, no lesions noted, moist mucus membranes  Neck: no lymphadenopathy appreciated, No carotid bruits, JVD 8 cm  Respiratory: lungs clear to ascultation bilaterally, no accessory use of muscles   Cardiovascular: Distant heart sounds, RRR, no murmurs or rubs appreciated  Gastrointestinal: soft, nontender, nondistended,no rebound or guarding, normoactive bowel sounds.   Extremities: pulses 2+ in all extremities, no pitting edema, normal ROM   Neuro:  full strength even in all extremities, no sensory deficits.   Skin: no lesions, rashes, or breakdown.       Laboratory:  Most Recent Data:  CBC:   Lab Results   Component Value Date    WBC 8.80 2019    HGB 11.4 (L) 2019    HCT 37.1 2019     2019    MCV 92 2019    RDW 14.3 2019       BMP:   Lab Results   Component Value Date     (L) 2019    K 4.1 2019     2019    CO2 20 (L) 2019    BUN 13 2019    CREATININE 2.0 (H) 2019    GLU 84 2019    CALCIUM 10.2 2019    MG 1.8 2019    PHOS 3.1 2019     LFTs:   Lab Results   Component " Value Date    PROT 7.9 07/17/2019    ALBUMIN 3.7 07/17/2019    BILITOT 0.5 07/17/2019    AST 22 07/17/2019    ALKPHOS 87 07/17/2019    ALT 9 (L) 07/17/2019     Coags:   Lab Results   Component Value Date    INR 1.1 12/26/2018     FLP:   Lab Results   Component Value Date    CHOL 183 03/05/2018    HDL 52 03/05/2018    LDLCALC 103.2 03/05/2018    TRIG 139 03/05/2018    CHOLHDL 28.4 03/05/2018     DM:   Lab Results   Component Value Date    HGBA1C 5.2 01/22/2018    HGBA1C 4.7 05/14/2012    LDLCALC 103.2 03/05/2018    CREATININE 2.0 (H) 07/17/2019     Thyroid:   Lab Results   Component Value Date    TSH 2.694 01/22/2018     Anemia:   Lab Results   Component Value Date    IRON 11 (L) 12/27/2018    TIBC 471 (H) 12/27/2018    FERRITIN 47 12/27/2018    VYTCIEPW25 >2000 (H) 09/23/2017    FOLATE 15.4 09/23/2017     Cardiac:   Lab Results   Component Value Date    TROPONINI 0.021 07/17/2019     (H) 07/17/2019     Urinalysis:   Lab Results   Component Value Date    LABURIN No growth 03/14/2018    COLORU Yellow 07/17/2019    SPECGRAV 1.010 07/17/2019    NITRITE Negative 07/17/2019    PROTEINUR trace 10/08/2014    KETONESU Negative 07/17/2019    UROBILINOGEN Negative 07/17/2019    BILIRUBINUR negative 10/08/2014    WBCUA 6 (H) 07/17/2019       Trended Lab Data:  Recent Labs   Lab 07/17/19  0839   WBC 8.80   HGB 11.4*   HCT 37.1      MCV 92   RDW 14.3   *   K 4.1      CO2 20*   BUN 13   CREATININE 2.0*   GLU 84   PROT 7.9   ALBUMIN 3.7   BILITOT 0.5   AST 22   ALKPHOS 87   ALT 9*       Trended Cardiac Data:  Recent Labs   Lab 07/17/19  0839   TROPONINI 0.021   *       Microbiology Data:  Plural fluid cytology pending     Other Results:  EKG (my interpretation): nl sinus, Right axis, low voltage    Radiology:  Imaging Results          X-Ray Chest AP Portable (Final result)  Result time 07/17/19 08:39:30    Final result by Denilson Patel MD (07/17/19 08:39:30)                 Impression:       As above.      Electronically signed by: Denilson Patel  Date:    07/17/2019  Time:    08:39             Narrative:    EXAMINATION:  XR CHEST AP PORTABLE    CLINICAL HISTORY:  shortness of breath;    TECHNIQUE:  Single frontal view of the chest was performed.    COMPARISON:  06/20/2019    FINDINGS:  There is moderate-sized, left layering pleural effusion, increased in the interval.  Left upper lung zone and right lung are relatively clear.  Cardiomediastinal silhouette is similar.  No acute osseous abnormality.                                 Assessment:     Katie Quevedo is a 74 y.o. female with:  Patient Active Problem List    Diagnosis Date Noted    Shortness of breath 07/17/2019    Pericardial effusion 07/05/2019    Pleural effusion on left     Acute on chronic respiratory failure with hypoxia 06/19/2019    History of pulmonary embolism 07/31/2018    CKD (chronic kidney disease), stage V     Medical non-compliance 04/26/2018    Current use of long term anticoagulation 03/16/2018    Pulmonary infarct 03/16/2018    History of colon polyps 02/06/2018    Atrophy of left kidney 01/25/2018    Kidney cysts 01/25/2018    Iron deficiency anemia 01/22/2018    Essential hypertension 01/22/2018    Chronic low back pain 09/25/2015    Osteoporosis 09/21/2015    Osteoarthritis, hip, bilateral 10/27/2014    Lumbar radiculopathy, BLE 10/27/2014    Cervical radiculopathy, BUE 10/27/2014    COPD (chronic obstructive pulmonary disease) with chronic bronchitis 10/08/2014    Biceps tendonitis 01/06/2014    Intractable migraine 12/18/2013    Rotator cuff tear, right 10/18/2013    Nuclear sclerosis - Both Eyes 08/08/2013    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  Left upper lung zone and right lung are relatively clear.     Pericardial Effusion  - Cards consulted; appreciate recs  - Echo today- small pericardial effusion, no evidence of tamponade  - No intervention at this time    Chronic Obstructive Pulmonary Disease w/ Chronic Hypoxic Resp Failure on 2L home O2  - Worsening dyspnea in last week, requires 2L home O2  - PFT (2018) : Obstructive and restrictive lung disease  - patients pulmonologist is Dr. Grove  - Patient takes Advair daily   - Chest X-ray: w/ L plural effusion   - Duonebs q3h PRN  - Continue Home fluticasone  - O2 titrated for sats >92%    Chronic Kidney Disease Stage 4   - Patient has known CKD  - Baseline eGFR: 29  - Baseline Cr: 1.9-2.0  - eGFR today 16   - BUN/Cr today: 13/2.0  - renally dose all medication    Chronic HFrEF; compensated   - Pt w/ trace edema bilaterally  - Echo: EF 55%, Grade II LV diastolic dysfunction, no tamponade  - No acute issues    Benign Essential Hypertension   - BP on admit 128/61  - Continue home norvasc  - continue to monitor     History of pulmonary embolism  - Holding home Apixaban 2.5 mg po BID w/ pending surgery tomorrow    Normocytic Anemia   - H/H: 11.4/37.1 on admit  - MCV: 92  - Studies: Iron 35, Ferritin 184, Folate 4.0, B12 647  - follow up CBC in AM      FEN: Replete PRN  Ppx: Heparin  Diet: NPO    Dispo: Vats procedure + biopsy tomorrow    Code Status:     Full     Yamini Trotter MD PhD  [employer] [facility] HO-II  [facility] IM Service Team A    [facility] Medicine  Hospitalist Pager numbers:   [facility] Hospitalist Medicine Team A (Mitzy/Benjamin): [phone]  [facility] Hospitalist Medicine Team B (Aubrie/Judit):  [phone]

## 2019-07-17 NOTE — ASSESSMENT & PLAN NOTE
Recurrent pleural effusion   - her first thoracentesis revealed a lymphocyte predominant effusion with few mesothelial cells which was concerning for possible underlying malignancy  - this thoracentesis, while still lymphocyte predominant was not as markedly so.  - sent for afb stain and culture although very low suspicion for that  - repeat cytology sent as well   - unsure how much this effusion contributed to her symptoms given that it was only 600 cc, however she does have COPD so she likely has a lower reserve than others would  - plan for pleural biopsy tomorrow with general surgery for definitive diagnosis

## 2019-07-18 PROBLEM — Z86.711 HISTORY OF PULMONARY EMBOLISM: Status: RESOLVED | Noted: 2018-07-31 | Resolved: 2019-07-18

## 2019-07-18 PROBLEM — J96.11 CHRONIC RESPIRATORY FAILURE WITH HYPOXIA: Status: ACTIVE | Noted: 2019-07-18

## 2019-07-18 LAB
ALBUMIN SERPL BCP-MCNC: 3.2 G/DL (ref 3.5–5.2)
ALP SERPL-CCNC: 76 U/L (ref 55–135)
ALT SERPL W/O P-5'-P-CCNC: 8 U/L (ref 10–44)
ANION GAP SERPL CALC-SCNC: 10 MMOL/L (ref 8–16)
AST SERPL-CCNC: 16 U/L (ref 10–40)
BASOPHILS # BLD AUTO: 0.05 K/UL (ref 0–0.2)
BASOPHILS NFR BLD: 0.6 % (ref 0–1.9)
BILIRUB SERPL-MCNC: 0.6 MG/DL (ref 0.1–1)
BUN SERPL-MCNC: 14 MG/DL (ref 8–23)
CALCIUM SERPL-MCNC: 9.4 MG/DL (ref 8.7–10.5)
CHLORIDE SERPL-SCNC: 101 MMOL/L (ref 95–110)
CO2 SERPL-SCNC: 23 MMOL/L (ref 23–29)
CREAT SERPL-MCNC: 1.9 MG/DL (ref 0.5–1.4)
DIFFERENTIAL METHOD: ABNORMAL
EOSINOPHIL # BLD AUTO: 0.6 K/UL (ref 0–0.5)
EOSINOPHIL NFR BLD: 7.4 % (ref 0–8)
ERYTHROCYTE [DISTWIDTH] IN BLOOD BY AUTOMATED COUNT: 14.1 % (ref 11.5–14.5)
EST. GFR  (AFRICAN AMERICAN): 29 ML/MIN/1.73 M^2
EST. GFR  (NON AFRICAN AMERICAN): 25 ML/MIN/1.73 M^2
GLUCOSE SERPL-MCNC: 98 MG/DL (ref 70–110)
HCT VFR BLD AUTO: 33.5 % (ref 37–48.5)
HGB BLD-MCNC: 10.2 G/DL (ref 12–16)
LYMPHOCYTES # BLD AUTO: 1.5 K/UL (ref 1–4.8)
LYMPHOCYTES NFR BLD: 18.6 % (ref 18–48)
MCH RBC QN AUTO: 27.5 PG (ref 27–31)
MCHC RBC AUTO-ENTMCNC: 30.4 G/DL (ref 32–36)
MCV RBC AUTO: 90 FL (ref 82–98)
MONOCYTES # BLD AUTO: 0.9 K/UL (ref 0.3–1)
MONOCYTES NFR BLD: 11.2 % (ref 4–15)
NEUTROPHILS # BLD AUTO: 5 K/UL (ref 1.8–7.7)
NEUTROPHILS NFR BLD: 62.2 % (ref 38–73)
PLATELET # BLD AUTO: 298 K/UL (ref 150–350)
PMV BLD AUTO: 9.8 FL (ref 9.2–12.9)
POTASSIUM SERPL-SCNC: 4.3 MMOL/L (ref 3.5–5.1)
PROT SERPL-MCNC: 6.7 G/DL (ref 6–8.4)
RBC # BLD AUTO: 3.71 M/UL (ref 4–5.4)
SODIUM SERPL-SCNC: 134 MMOL/L (ref 136–145)
WBC # BLD AUTO: 8.07 K/UL (ref 3.9–12.7)

## 2019-07-18 PROCEDURE — 25000003 PHARM REV CODE 250: Mod: HCNC | Performed by: INTERNAL MEDICINE

## 2019-07-18 PROCEDURE — 25000003 PHARM REV CODE 250: Mod: HCNC | Performed by: STUDENT IN AN ORGANIZED HEALTH CARE EDUCATION/TRAINING PROGRAM

## 2019-07-18 PROCEDURE — 27000221 HC OXYGEN, UP TO 24 HOURS: Mod: HCNC

## 2019-07-18 PROCEDURE — 94799 UNLISTED PULMONARY SVC/PX: CPT | Mod: HCNC

## 2019-07-18 PROCEDURE — 11000001 HC ACUTE MED/SURG PRIVATE ROOM: Mod: HCNC

## 2019-07-18 PROCEDURE — 80053 COMPREHEN METABOLIC PANEL: CPT | Mod: HCNC

## 2019-07-18 PROCEDURE — 94640 AIRWAY INHALATION TREATMENT: CPT | Mod: HCNC

## 2019-07-18 PROCEDURE — 94664 DEMO&/EVAL PT USE INHALER: CPT | Mod: HCNC

## 2019-07-18 PROCEDURE — 99900035 HC TECH TIME PER 15 MIN (STAT): Mod: HCNC

## 2019-07-18 PROCEDURE — 94761 N-INVAS EAR/PLS OXIMETRY MLT: CPT | Mod: HCNC

## 2019-07-18 PROCEDURE — 36415 COLL VENOUS BLD VENIPUNCTURE: CPT | Mod: HCNC

## 2019-07-18 PROCEDURE — 85025 COMPLETE CBC W/AUTO DIFF WBC: CPT | Mod: HCNC

## 2019-07-18 RX ADMIN — SODIUM BICARBONATE 650 MG TABLET 650 MG: at 02:07

## 2019-07-18 RX ADMIN — SODIUM BICARBONATE 650 MG TABLET 650 MG: at 08:07

## 2019-07-18 RX ADMIN — CALCIUM CARBONATE (ANTACID) CHEW TAB 500 MG 500 MG: 500 CHEW TAB at 04:07

## 2019-07-18 RX ADMIN — CALCIUM CARBONATE (ANTACID) CHEW TAB 500 MG 500 MG: 500 CHEW TAB at 08:07

## 2019-07-18 RX ADMIN — DIAZEPAM 2 MG: 2 TABLET ORAL at 08:07

## 2019-07-18 NOTE — PROGRESS NOTES
.Pharmacy New Medication Education    Patient and/or Care Giver ACCEPTED medication education.    Learners of pharmacy medication education included:  family     Patient +/- learner response:  Verbalized Understanding, Teachback    Pharmacy educated patient on  the name and purpose of medications and possible side effects, using the teach-back method.   (medication, indication, side effect)  Medication(s):  Dilaudid  Norco    Indication:  pain    Possible Side Effects:  Nausea,vomiting,constipation      In addition, the following medications were also discussed.    Current Facility-Administered Medications:     albuterol-ipratropium 2.5 mg-0.5 mg/3 mL nebulizer solution 3 mL, 3 mL, Nebulization, TID PRN, Julio AJae Hicksana, DO    calcium carbonate 200 mg calcium (500 mg) chewable tablet 500 mg, 500 mg, Oral, BID WM, Rosa Maria Alexander MD, 500 mg at 07/18/19 0829    ciprofloxacin (CIPRO)400mg/200ml D5W IVPB 400 mg, 400 mg, Intravenous, On Call Procedure, Rose Patrick MD    diazePAM tablet 2 mg, 2 mg, Oral, Daily, Julio A. Fabiolaana, DO, 2 mg at 07/18/19 0829    fluticasone furoate-vilanterol 200-25 mcg/dose diskus inhaler 1 puff, 1 puff, Inhalation, Daily, Julio A. Matrana, DO    sodium bicarbonate tablet 650 mg, 650 mg, Oral, TID, Julio A. Matrana, DO, 650 mg at 07/18/19 0829    sodium chloride 0.9% flush 10 mL, 10 mL, Intravenous, PRN, Julio JOHNSON Hicksana, DO    vancomycin in dextrose 5 % 1 gram/250 mL IVPB 1,000 mg, 1,000 mg, Intravenous, On Call Procedure, Rose Patrick MD

## 2019-07-18 NOTE — PHYSICIAN QUERY
"PT Name: Katie Quevedo  MR #: [id number]    Physician Query Form - Respiratory Condition Clarification      CDS/: Ashu Mendez RN               Contact information:    [email or web address]    This form is a permanent document in the medical record.    Query Date: July 18, 2019    By submitting this query, we are merely seeking further clarification of documentation. Please utilize your independent clinical judgment when addressing the question(s) below.    The Medical record contains the following   Indicators   Supporting Clinical Findings Location in Medical Record   x   SOB, BERTRAND, Wheezing, Productive Cough, Use of Accessory Muscles, etc. Shortness of Breath---  74 year old female presents to ed cc of sob x 2 weeks.    Pulmonary/Chest: Accessory muscle usage present. Tachypnea noted. She is in respiratory distress. She has decreased breath sounds in the left lower field.....Respiratory: Positive for cough, chest tightness and shortness of breath.   7/17  ED provider        7/17 pulmonology- consult   x   Acute/Chronic Illness Dyspnea 2/2 L Pleural Effusion, Pericardial Effusion, COPD, ......   7/17   H&P   x   Radiology Findings   FINDINGS:    There is moderate-sized, left layering pleural effusion, increased in the interval.  Left upper lung zone and right lung are relatively clear.     7/17 CXR      Respiratory Distress or Failure        Hypoxia or Hypercapnia     x   RR         ABGs         O2 sat 7/17  RR: 22,  30,   28,  26,  18....  7/17  Sats: on RA: 96,  95,  94 , 95  7/17  Sats on 3LNC:  97,  98,  94 VS flow sheet  "  "      BiPAP/Intubation        Supplemental O2     x   Home O2, Oxygen Dependence She is currently on 2 liters of oxygen at home, but has been feeling worse despite using her O2.  7/17  ED provider      Treatment     x   Other Thoracentesis:  Drainage amount: 600 ml  7/17  Procedure- pulmonology     Respiratory failure can be acute, chronic or both. It is generally further " specified as hypoxic, hypercapnic or both. Lastly, it is important to identify an etiology, if known or suspected.   References::  https://www.acphospitalist.org/archives/2013/10/coding.htm http://[employer].Sem[employer]/acute-respiratory-failure-know     The clinical guidelines noted below are only system guidelines, and do not replace the providers clinical judgment.    Provider, please specify diagnosis or diagnoses associated with above clinical findings.   Please kalyani all that apply:      [ x  ] Chronic Respiratory Failure with Hypoxia -Continuous home oxygen use   [   ] Chronic Respiratory Failure with Hypercapnia - Normal pH with high pCO2 (chronic hypercapnia) (common example: COPD)   [   ] Chronic Respiratory Failure with Hypoxia and Hypercapnia - Continuous home oxygen and Normal pH with high pCO2 (chronic hypercapnia) (common example: COPD)   [   ] Other Chronic Respiratory Failure   [   ] Acute Respiratory Distress - Generally describes less severe respiratory symptoms (tachypnea, in respiratory distress, increased work of breathing, unable to speak in complete sentences, labored breathing, use of accessory muscles, RR> 24, cyanosis, dyspnea, wheezing, stridor, lethargy) without sufficient measurements (pO2, SpO2, pH, and pCO2) to meet criteria for respiratory failure    [   ] Other Respiratory Diagnosis (please specify): _________________________________   [   ]  Clinically Undetermined       Please document in your progress notes daily for the duration of treatment until resolved and include in your discharge summary.

## 2019-07-18 NOTE — PLAN OF CARE
VN note: VN cued into patient's room for introduction. Family and bedside nurse Linda at bedside. VN informed patient and family that VN would be working closely along side bedside nurse, PCT, and the rest of care team and making rounds throughout the shift. They verbalized understanding. Allowed time for questions. VN will continue to be available to patient and intervene prn.       07/18/19 1026   Type of Frequent Check   Type Patient Rounds;Other (see comments)  (VN Rounds)   Safety/Activity   Patient Rounds bed in low position;visualized patient   Safety Promotion/Fall Prevention side rails raised x 2;instructed to call staff for mobility;family to remain at bedside   Activity Management activity adjusted per tolerance   Positioning   Body Position supine   Head of Bed (HOB) HOB at 30-45 degrees   Assessments (Pre/Post)   Level of Consciousness (AVPU) alert

## 2019-07-18 NOTE — PLAN OF CARE
Problem: Adult Inpatient Plan of Care  Goal: Plan of Care Review  Patient on oxygen with documented flow.  Will attempt to wean per O2 order protocol.

## 2019-07-18 NOTE — PLAN OF CARE
Problem: Adult Inpatient Plan of Care  Goal: Plan of Care Review  Outcome: Ongoing (interventions implemented as appropriate)  Plan of care reviewed with patient and spouse. Both patient and spouse voice understanding. NSR on monitor. Spoke with internal medicine team; pt will likely go for VATS tomorrow. NPO at midnight.  No acute distress noted. Side rails x 2, bed in lowest position, call bell within reach, pt advised to call for assistance. Maintain bed alarm for patient safety.  at bedside.

## 2019-07-18 NOTE — PLAN OF CARE
Problem: Adult Inpatient Plan of Care  Goal: Plan of Care Review  Outcome: Ongoing (interventions implemented as appropriate)  Pt on oxygen in no apparent distress. MDI, IS, and Taylor tx. Given with ok pt. Effort.  Will cont. To monitor.

## 2019-07-18 NOTE — PLAN OF CARE
Problem: Adult Inpatient Plan of Care  Goal: Plan of Care Review  Outcome: Revised  Pt stable. NO distress noted. POC reviewed with pt. Pt verbalized understanding. Pt remains SR on the monitor. NO true red alarms noted. Pain med given for incisional back pain. Dressing to lower back CDI. Hibiclens bath given. Pt sating 95% on 3LNC.  Fall precautions maintained. Bed in lowest position, call light in reach and bed alarm on.

## 2019-07-18 NOTE — PHYSICIAN QUERY
"PT Name: Katie Quevedo  MR #: [id number]    Physician Query Form - Heart  Condition Clarification     CDS/: Ashu Mendez RN               Contact information:   [email or web address]      This form is a permanent document in the medical record.     Query Date: July 18, 2019    By submitting this query, we are merely seeking further clarification of documentation. Please utilize your independent clinical judgment when addressing the question(s) below.    The medical record contains the following   Indicators     Supporting Clinical Findings Location in Medical Record   x  7/17  Labs      EF     x Radiology findings FINDINGS:    EKG wires overlie the chest.  The cardiac silhouette is normal in size.  The pulmonary vascularity is normal.  Minimal increased attenuation left lung base suggest residual atelectasis, overall much better aerated compared to the prior exam.  There is less pleural fluid.  No pneumothorax seen.   7/17  CXR   x Echo Results · Normal left ventricular systolic function. The estimated ejection fraction is 55%  · Grade II (moderate) left ventricular diastolic dysfunction consistent with pseudonormalization. 7/17  TTE- conclusion    "Ascites" documented     x "SOB" or "BERTRAND" documented Shortness of Breath:  74 year old female presents to ed cc of sob x 2 weeks   7/17  ED provider    "Hypoxia" documented     x Heart Failure documented Differential Diagnosis includes, but is not limited to:....  CHF exacerbation, .... 7/17  ED provider    "Edema" documented      Diuretics/Meds      Treatment:     x Other:  Pericardial Effusion, Dyspnea 2/2 L Pleural Effusion, Chronic Obstructive Pulmonary Disease  - Worsening dyspnea in last week, requires 2L home O2    · · Small pericardial effusion. No tamponade. No RA or RV collapse is seen. There is >30% Mitral inflow velocity variation with respiration...   7/17  H&P          7/17  Cardiology- care update   Heart failure (HF) can be acute, " chronic or both. It is generally further specificed as systolic, diastolic, or combined. Lastly, it is important to identify an underlying etiology if known or suspected.     Common clues to acute exacerbation:  Rapidly progressive symptoms (w/in 2 weeks of presentation), using IV diuretics to treat, using supplemental O2, pulmonary edema on Xray, MI w/in 4 weeks, and/or BNP >500    Systolic Heart Failure: is defined as chart documentation of a left ventricular ejection fraction (LVEF) less than 40%     Diastolic Heart Failure: is defined as a left ventricular ejection fraction (LVEF) greater than 40%   +      Evidence of diastolic dysfunction on echocardiography OR    Right heart catheterization wedge pressure above 12 mm Hg OR    Left heart catheterization left ventricular end diastolic pressure 18 mm Hg or above.    References: *American Heart Association    The clinical guidelines noted below are only system guidelines, and do not replace the providers clinical judgment.     Provider, please specify the diagnosis associated with above clinical findings    [   ] Acute Diastolic Heart Failure - New diagnosis.  EF > 40%  and acute HF symptoms documented  [   ] Other Type of Heart Failure (please specify type): _________________________  [   ] Heart Failure Ruled Out  [  x ] Other (please specify): ______Chronic HFpEF_____________________________  ] Clinically Undetermined                          Please document in your progress notes daily for the duration of treatment until resolved and include in your discharge summary.

## 2019-07-18 NOTE — NURSING
Procedure complete. Patient tolerated well. 3600 ml of cloudy, yellow fluid removed from abdomen. Band daid applied to site. Patient stable.

## 2019-07-18 NOTE — PROGRESS NOTES
"[facility] IM Resident [facility] Progress Note    Subjective:      Katie Quevedo is a 74 y.o. female who is being followed by the [facility] IM service at [facility] for L pleural effusion of unknown etiology    Pt. Complaining of pain in back and neck, this is chronic for her. Pt on 3L and states her dyspnea has gotten a little worse since the procedure yesterday. Also significant anxiety w/ procedure. Denies N/V, chest pain.      Objective:   Last 24 Hour Vital Signs:  BP  Min: 95/59  Max: 146/79  Temp  Av.6 °F (36.4 °C)  Min: 96.3 °F (35.7 °C)  Max: 98.7 °F (37.1 °C)  Pulse  Av.4  Min: 91  Max: 119  Resp  Av.2  Min: 18  Max: 36  SpO2  Av.8 %  Min: 92 %  Max: 98 %  Height  Av' 2.3" (158.2 cm)  Min: 5' 2.25" (158.1 cm)  Max: 5' 2.25" (158.1 cm)  Weight  Av.9 kg (191 lb 7.6 oz)  Min: 60.9 kg (134 lb 3 oz)  Max: 138 kg (304 lb 3.8 oz)  I/O last 3 completed shifts:  In: -   Out: 300 [Urine:300]    Physical Examination:  General: Alert, Awake, Oriented x 3, uncomfortable appearing elderly woman  Head: normocephalic, atraumatic  Eyes: PERRL, EOMI, no scleral icterus, no conjunctival pallor  Nose: no tenderness to palpation, no drainage or erythema appreciated  Mouth and Throat: nl dentition, no lesions noted, moist mucus membranes  Neck: no lymphadenopathy appreciated, No carotid bruits, JVD 8 cm  Respiratory: crackles @ L base, no accessory use of muscles   Cardiovascular: Distant heart sounds, RRR, no murmurs or rubs appreciated  Gastrointestinal: soft, nontender, nondistended,no rebound or guarding, normoactive bowel sounds.   Extremities: pulses 2+ in all extremities, no pitting edema, normal ROM   Neuro:  full strength even in all extremities, no sensory deficits.   Skin: no lesions, rashes, or breakdown.     Laboratory:  Laboratory Data Reviewed: yes  Pertinent Findings:  Recent Labs   Lab 19  0839 19  1524 19  0359   WBC 8.80  --  8.07   HGB 11.4* 11.6* 10.2*   HCT " 37.1  --  33.5*     --  298   MCV 92  --  90   RDW 14.3  --  14.1   *  --  134*   K 4.1  --  4.3     --  101   CO2 20*  --  23   BUN 13  --  14   CREATININE 2.0*  --  1.9*   GLU 84  --  98   PROT 7.9  --  6.7   ALBUMIN 3.7  --  3.2*   BILITOT 0.5  --  0.6   AST 22  --  16   ALKPHOS 87  --  76   ALT 9*  --  8*         Pathology Data Reviewed: yes  Pertinent Findings:  Pending    Other Results:  EKG (my interpretation): normal EKG, normal sinus rhythm, unchanged from previous tracings.    Radiology Data Reviewed: yes  Pertinent Findings:  No new    Current Medications:     Infusions:       Scheduled:   calcium carbonate  500 mg Oral BID WM    diazePAM  2 mg Oral Daily    fluticasone furoate-vilanterol  1 puff Inhalation Daily    sodium bicarbonate  650 mg Oral TID        PRN:  albuterol-ipratropium, ciprofloxacin, sodium chloride 0.9%, vancomycin (VANCOCIN) IVPB    Antibiotics and Day Number of Therapy:  Ppx w/ surgery today    Lines and Day Number of Therapy:  PIV 7.19    Assessment:     Katie Quevedo is a 74 y.o.female with L pleural effusion of unknown etiology     Plan:     Dyspnea 2/2 L Pleural Effusion  - Continue home 2L Oxygen  - Pulse ox every 4 hours  - Pulm consulted; appreciate recs  - Pleurocentesis yesterday; 600ml removed  - Plural cytology pending  - Surgery Consulted; VATS procedure for biopsy  - Consulted IR for pigtail catether  - CXR: moderate-sized, left pleural effusion, increased since last study.  Left upper lung zone and right lung are relatively clear.      Pericardial Effusion  - Cards consulted; appreciate recs  - Echo yesterday- small pericardial effusion, no evidence of tamponade  - No intervention at this time     Chronic Obstructive Pulmonary Disease   - Worsening dyspnea in last week, requires 2L home O2, dyspnea improved after pleurocentesis  - PFT (2018) : Obstructive and restrictive lung disease  - patients pulmonologist is Dr. Grove  - Patient takes Advair  daily   - Chest X-ray: w/ L plural effusion s/p pleurocentesis  - Duonebs q3h PRN  - Continue Home fluticasone  - O2 titrated for sats >92%     Chronic Kidney Disease Stage 4   - Patient has known CKD  - Baseline eGFR: 29  - Baseline Cr: 1.9-2.0  - eGFR today 16   - BUN/Cr today: 13/2.0  - renally dose all medication     Benign Essential Hypertension   - BP on admit 128/61  - Continue home norvasc  - continue to monitor      History of pulmonary embolism  - Holding home Apixaban 2.5 mg po BID w/ pending surgery tomorrow     Normocytic Anemia   - H/H: 11.4/37.1 on admit  - MCV: 92  - Studies: Iron 35, Ferritin 184, Folate 4.0, B12 647  - follow up daily CBC     Chronic Pain  - Takes home hydrocodone 10   - Complaining of pain and anxiety     FEN: Replete PRN  Ppx: Heparin  Diet: NPO  Dispo: Vats procedure + biopsy tomorrow    Code: FULL    Yamini Trotter MD PhD  [facility] Internal Medicine -II  [facility] IM Service Team A    [facility] Medicine Hospitalist Pager numbers:   [facility] Hospitalist Medicine Team A (Mitzy/Benjamin): [phone]  [facility] Hospitalist Medicine Team B (Aubrie/Judit):  [phone]

## 2019-07-18 NOTE — CONSULTS
[facility]  Pulmonology  Consult Note    Patient Name: Katie Quevedo  MRN: [id number]  Admission Date: 7/17/2019  Hospital Length of Stay: 0 days  Code Status: Full Code  Attending Physician: Rosa Maria Alexander MD  Primary Care Provider: Kingston Verduzco MD   Principal Problem: Pleural effusion on left    Inpatient consult to Pulmonology  Consult performed by: Jessica Joseph MD  Consult ordered by: Julio Concepcion DO  Reason for consult: recurrent pleural effuison        Subjective:     HPI:  Ms. Quevedo is a 73 yo F with COPD on oxygen at home 2L that presents with recurrent pleural effusion and severe shortness of breath. She was admitted 3 weeks ago and had a thoracentesis that was lymphocytic and bloody, cytology was negative. No lung nodules or cancer history. No rheumatologic diseases.     Past Medical History:   Diagnosis Date    Anemia     Bilateral renal cysts     Cataract     Chronic LBP 7/26/2012    Chronic pain     CKD (chronic kidney disease), stage V     COPD (chronic obstructive pulmonary disease)     Dehydration     Encounter for blood transfusion     HTN (hypertension)     Lumbar spondylosis     Melanoma     of the lip    Metabolic bone disease     Migraines, neuralgic     Osteoporosis     Primary osteoarthritis of both knees     s/p Rt TKA    Pulmonary embolism with infarction     Seizures     Subdeltoid bursitis, L>R. 9/27/2012    Ulcer     Vitamin D deficiency disease        Past Surgical History:   Procedure Laterality Date    BLADDER SUSPENSION      CATARACT EXTRACTION  11/18/13    left eye    CERVICAL LAMINECTOMY      x3, fusion x1    COLONOSCOPY  2009    HYSTERECTOMY      INSERTION, IOL PROSTHESIS Left 11/18/2013    [facility]    INSERTION, IOL PROSTHESIS Right 10/28/2013    [facility]    JOINT REPLACEMENT  2001    total right knee     LUMBAR LAMINECTOMY      x 3, fusion x1    OOPHORECTOMY       PHACOEMULSIFICATION, CATARACT Left 11/18/2013    [facility]    PHACOEMULSIFICATION, CATARACT Right 10/28/2013    [facility]       Review of patient's allergies indicates:   Allergen Reactions    Aspirin      Other reaction(s): hx of ulcers    Tetracycline Swelling     Other reaction(s): Swelling    Penicillins Rash     Other reaction(s): Hives  Other reaction(s): Rash  Other reaction(s): Rash  Other reaction(s): Hives       Family History     Problem Relation (Age of Onset)    Arthritis Mother    Cancer Father    Cataracts Father, Sister    Diabetes Maternal Aunt    Glaucoma Cousin    Heart attack Maternal Grandfather    Heart disease Maternal Grandfather    Hypertension Father, Maternal Grandfather    Stroke Mother        Tobacco Use    Smoking status: Former Smoker     Types: Cigarettes    Smokeless tobacco: Never Used   Substance and Sexual Activity    Alcohol use: Yes     Alcohol/week: 0.6 oz     Types: 1 Glasses of wine per week     Comment: Rare    Drug use: No    Sexual activity: Never         Review of Systems   Constitutional: Positive for activity change and fatigue. Negative for chills and fever.   HENT: Positive for congestion. Negative for ear pain, facial swelling and hearing loss.    Eyes: Negative.    Respiratory: Positive for cough, chest tightness and shortness of breath. Negative for wheezing.    Cardiovascular: Negative for chest pain, palpitations and leg swelling.   Endocrine: Negative for cold intolerance, heat intolerance and polydipsia.   Genitourinary: Negative for difficulty urinating, dysuria, enuresis and flank pain.   Musculoskeletal: Positive for back pain. Negative for myalgias.   Skin: Negative.    Allergic/Immunologic: Negative.    Neurological: Negative for dizziness, seizures and speech difficulty.   Hematological: Negative.    Psychiatric/Behavioral: Negative.      Objective:     Vital Signs (Most  Recent):  Temp: 98.2 °F (36.8 °C) (07/17/19 1527)  Pulse: 98 (07/17/19 1527)  Resp: (!) 26 (07/17/19 1500)  BP: 129/63 (07/17/19 1527)  SpO2: 97 % (07/17/19 1527) Vital Signs (24h Range):  Temp:  [98 °F (36.7 °C)-98.2 °F (36.8 °C)] 98.2 °F (36.8 °C)  Pulse:  [] 98  Resp:  [22-36] 26  SpO2:  [92 %-97 %] 97 %  BP: (124-146)/(61-79) 129/63     Weight: (!) 138 kg (304 lb 3.8 oz)  Body mass index is 55.2 kg/m².    No intake or output data in the 24 hours ending 07/17/19 1555    Physical Exam   Constitutional: She is oriented to person, place, and time. She appears well-developed and well-nourished.   HENT:   Head: Normocephalic and atraumatic.   Eyes: Pupils are equal, round, and reactive to light. EOM are normal.   Neck: Normal range of motion. Neck supple.   Cardiovascular: Normal rate, regular rhythm and normal heart sounds.   No murmur heard.  Pulmonary/Chest: Accessory muscle usage present. Tachypnea noted. She is in respiratory distress. She has decreased breath sounds in the left lower field.   Musculoskeletal: Normal range of motion.   Neurological: She is alert and oriented to person, place, and time.   Skin: Skin is warm and dry. Capillary refill takes less than 2 seconds.   Psychiatric: She has a normal mood and affect.   Nursing note and vitals reviewed.      Vents:       Lines/Drains/Airways     Peripheral Intravenous Line                 Peripheral IV - Single Lumen 06/19/19 1945 20 G Left Antecubital 27 days                Significant Labs:    CBC/Anemia Profile:  Recent Labs   Lab 07/17/19  0839 07/17/19  0844 07/17/19  1524   WBC 8.80  --   --    HGB 11.4*  --  11.6*   HCT 37.1  --   --      --   --    MCV 92  --   --    RDW 14.3  --   --    FERRITIN  --  148  --         Chemistries:  Recent Labs   Lab 07/17/19  0839   *   K 4.1      CO2 20*   BUN 13   CREATININE 2.0*   CALCIUM 10.2   ALBUMIN 3.7   PROT 7.9   BILITOT 0.5   ALKPHOS 87   ALT 9*   AST 22   MG 2.0   PHOS 4.0      Pleural fluids- inflammatory- not lymphocytic predominant this time      Significant Imaging:   CXR with recurrent pleural effusion  Echo with minimal pericardial effusion    Assessment/Plan:     * Pleural effusion on left  Recurrent pleural effusion   - her first thoracentesis revealed a lymphocyte predominant effusion with few mesothelial cells which was concerning for possible underlying malignancy  - this thoracentesis, while still lymphocyte predominant was not as markedly so.  - sent for afb stain and culture although very low suspicion for that  - repeat cytology sent as well   - unsure how much this effusion contributed to her symptoms given that it was only 600 cc, however she does have COPD so she likely has a lower reserve than others would  - plan for pleural biopsy tomorrow with general surgery for definitive diagnosis    COPD (chronic obstructive pulmonary disease) with chronic bronchitis  Continue home inhalers and oxygen  Maintain O2 sats 88% and above   duonebs PRN           Thank you for your consult. I will follow-up with patient. Please contact us if you have any additional questions.     Jessica Joseph MD  Pulmonology  [facility]

## 2019-07-18 NOTE — PROGRESS NOTES
Surgery Progress Note    S:  NAEO. Patient underwent thoracentesis at bedside in ED. Patient states she feels she is at her baseline SOB. She has a higher oxygen requirement at 3L NC than she uses at home. She feels her neck and back pain are not well controlled.     O:  Temp:  [96.3 °F (35.7 °C)-98.7 °F (37.1 °C)] 96.8 °F (36 °C)  Pulse:  [] 91  Resp:  [18-36] 20  SpO2:  [92 %-98 %] 95 %  BP: ()/(53-79) 109/71    Physical Exam:  Gen: no acute distress. Alert and oriented x3.  HEENT: normocephalic and atraumatic. EOMI.   Resp: 3L NC in place, tachypneic   CV: regular rate  Abd: soft, non-tender  Ext: warm and well perfused  MSK: normal range of motion, normal strength  Neuro: no focal deficits, normal sensation    Recent Labs   Lab 07/18/19  0359   WBC 8.07   RBC 3.71*   HGB 10.2*   HCT 33.5*      MCV 90   MCH 27.5   MCHC 30.4*     Recent Labs   Lab 07/18/19  0359   CALCIUM 9.4   PROT 6.7   *   K 4.3   CO2 23      BUN 14   CREATININE 1.9*   ALKPHOS 76   ALT 8*   AST 16   BILITOT 0.6       A/P:    -Recommend first for IR to place pigtail catheter if patient continues to be symptomatic before proceeding to VATS with pleural biopsy and potential pleurodesis   -Concern for possible pulmonary malignancy given re-accumulation of fluid  -Incentive spirometry and chest physiotherapy pre and post operatively  -Recommend cardiology for risk stratification    Rose Patrick MD  PGY-2  [facility] General Surgery

## 2019-07-18 NOTE — PLAN OF CARE
07/18/19 1623   Discharge Assessment   Assessment Type Discharge Planning Assessment   Confirmed/corrected address and phone number on facesheet? Yes   Assessment information obtained from? Patient;Caregiver   Prior to hospitilization cognitive status: Alert/Oriented   Prior to hospitalization functional status: Independent   Current cognitive status: Alert/Oriented   Current Functional Status: Independent   Lives With spouse   Able to Return to Prior Arrangements yes   Is patient able to care for self after discharge? Yes   Who are your caregiver(s) and their phone number(s)? Dre Quevedo()[phone]   Patient's perception of discharge disposition home or selfcare   Readmission Within the Last 30 Days previous discharge plan unsuccessful   If yes, most recent facility name: [facility]   Patient currently being followed by outpatient case management? No   Patient currently receives any other outside agency services? No   Equipment Currently Used at Home oxygen;nebulizer;power chair;other (see comments)  (Inogen machine)   Do you have any problems affording any of your prescribed medications? No   Is the patient taking medications as prescribed? yes   Does the patient have transportation home? Yes   Transportation Anticipated family or friend will provide   Does the patient receive services at the [facility]? No   Discharge Plan A Home with family   Discharge Plan B Home Health   DME Needed Upon Discharge    (TBD)   Patient/Family in Agreement with Plan yes   Readmission Questionnaire   At the time of your discharge, did someone talk to you about what your health problems were? Yes   At the time of discharge, did someone talk to you about what to watch out for regarding worsening of your health problem? Yes   At the time of discharge, did someone talk to you about what to do if you experienced worsening of your health problem? Yes   At the time of discharge, did someone talk to you about which medication to  take when you left the hospital and which ones to stop taking? Yes   At the time of discharge, did someone talk to you about when and where to follow up with a doctor after you left the hospital? Yes   What do you believe caused you to be sick enough to be re-admitted? sob and her home o2 wasn't helping   How often do you need to have someone help you when you read instructions, pamphlets, or other written material from your doctor or pharmacy? Rarely   Do you have problems taking your medications as prescribed? No   Do you have any problems affording any of  your prescribed medications? No   Do you have problems obtaining/receiving your medications? No   Does the patient have transportation to healthcare appointments? Yes   Living Arrangements house   Does the patient have family/friends to help with healtcare needs after discharge? yes   Does your caregiver provide all the help you need? Yes   Are you currently feeling confused? No   Are you currently having problems thinking? No   Are you currently having memory problems? No   Have you felt down, depressed, or hopeless? 0   Have you felt little interest or pleasure in doing things? 0   In the last 7 days, my sleep quality was: poor      Shortness of Breath       74 year old female presents to ed cc of sob x 2 weeks. patient wears o2 at home which is not helping       Katie Quevedo is a 74 y.o. female who  has a past medical history of Anemia, Bilateral renal cysts, Cataract, Chronic LBP (7/26/2012), Chronic pain, CKD (chronic kidney disease), stage V, COPD (chronic obstructive pulmonary disease), Dehydration, Encounter for blood transfusion, HTN (hypertension), Lumbar spondylosis, Melanoma, Metabolic bone disease, Migraines, neuralgic, Osteoporosis, Primary osteoarthritis of both knees, Pulmonary embolism with infarction, Seizures, Subdeltoid bursitis, L>R. (9/27/2012), Ulcer, and Vitamin D deficiency disease.     The patient presents to the ED due to shortness  of breath that started 2 weeks ago. She reports her shortness of breath has gradually worsened since being discharged from the hospital. She is currently on 2 liters of oxygen at home, but has been feeling worse despite using her O2.   She reports a history of pleural effusion that required drainage while in the hospital during her last admission.   She was scheduled to f/u with Pulmonology today, but felt SOB, so was referred to the ED.  She denies any cough, fever, CP, N/V, abdominal pain, or urinary complaints.      The history is provided by the patient.     The Sw met with the pt and her  to complete the assessment. The pt was independent with her adl's and uses a nebulizer,Inogen machine,o2 and a battery operated scooter at home. The pt saw Dr. Gambino in [facility] 7[facility]2-19 after being hospitalized for progressive sob. The pt was d/c'd from [facility] 6-21-19. The Sw wrote her contact info on the white board in the pt's room and gave them a d/c brochure. The Sw encouraged them to call should they have any questions or concerns.

## 2019-07-19 VITALS
SYSTOLIC BLOOD PRESSURE: 133 MMHG | HEART RATE: 98 BPM | WEIGHT: 138 LBS | TEMPERATURE: 99 F | RESPIRATION RATE: 18 BRPM | DIASTOLIC BLOOD PRESSURE: 62 MMHG | OXYGEN SATURATION: 97 % | BODY MASS INDEX: 25.4 KG/M2 | HEIGHT: 62 IN

## 2019-07-19 LAB
ALBUMIN SERPL BCP-MCNC: 2.9 G/DL (ref 3.5–5.2)
ALP SERPL-CCNC: 71 U/L (ref 55–135)
ALT SERPL W/O P-5'-P-CCNC: 7 U/L (ref 10–44)
ANION GAP SERPL CALC-SCNC: 9 MMOL/L (ref 8–16)
AST SERPL-CCNC: 16 U/L (ref 10–40)
BASOPHILS # BLD AUTO: 0.02 K/UL (ref 0–0.2)
BASOPHILS NFR BLD: 0.3 % (ref 0–1.9)
BILIRUB SERPL-MCNC: 0.4 MG/DL (ref 0.1–1)
BUN SERPL-MCNC: 15 MG/DL (ref 8–23)
CALCIUM SERPL-MCNC: 9.3 MG/DL (ref 8.7–10.5)
CHLORIDE SERPL-SCNC: 102 MMOL/L (ref 95–110)
CO2 SERPL-SCNC: 25 MMOL/L (ref 23–29)
CREAT SERPL-MCNC: 1.7 MG/DL (ref 0.5–1.4)
DIFFERENTIAL METHOD: ABNORMAL
EOSINOPHIL # BLD AUTO: 0.6 K/UL (ref 0–0.5)
EOSINOPHIL NFR BLD: 9 % (ref 0–8)
ERYTHROCYTE [DISTWIDTH] IN BLOOD BY AUTOMATED COUNT: 14.2 % (ref 11.5–14.5)
EST. GFR  (AFRICAN AMERICAN): 33 ML/MIN/1.73 M^2
EST. GFR  (NON AFRICAN AMERICAN): 29 ML/MIN/1.73 M^2
GLUCOSE SERPL-MCNC: 92 MG/DL (ref 70–110)
HCT VFR BLD AUTO: 32.2 % (ref 37–48.5)
HGB BLD-MCNC: 10 G/DL (ref 12–16)
LYMPHOCYTES # BLD AUTO: 1.5 K/UL (ref 1–4.8)
LYMPHOCYTES NFR BLD: 25 % (ref 18–48)
MCH RBC QN AUTO: 27.6 PG (ref 27–31)
MCHC RBC AUTO-ENTMCNC: 31.1 G/DL (ref 32–36)
MCV RBC AUTO: 89 FL (ref 82–98)
MONOCYTES # BLD AUTO: 0.9 K/UL (ref 0.3–1)
MONOCYTES NFR BLD: 14 % (ref 4–15)
NEUTROPHILS # BLD AUTO: 3.1 K/UL (ref 1.8–7.7)
NEUTROPHILS NFR BLD: 51.7 % (ref 38–73)
PLATELET # BLD AUTO: 301 K/UL (ref 150–350)
PMV BLD AUTO: 10.2 FL (ref 9.2–12.9)
POTASSIUM SERPL-SCNC: 4 MMOL/L (ref 3.5–5.1)
PROT SERPL-MCNC: 6.3 G/DL (ref 6–8.4)
RBC # BLD AUTO: 3.62 M/UL (ref 4–5.4)
SODIUM SERPL-SCNC: 136 MMOL/L (ref 136–145)
WBC # BLD AUTO: 6.08 K/UL (ref 3.9–12.7)

## 2019-07-19 PROCEDURE — 94640 AIRWAY INHALATION TREATMENT: CPT | Mod: HCNC

## 2019-07-19 PROCEDURE — 94664 DEMO&/EVAL PT USE INHALER: CPT | Mod: HCNC

## 2019-07-19 PROCEDURE — 25000003 PHARM REV CODE 250: Mod: HCNC | Performed by: STUDENT IN AN ORGANIZED HEALTH CARE EDUCATION/TRAINING PROGRAM

## 2019-07-19 PROCEDURE — 94761 N-INVAS EAR/PLS OXIMETRY MLT: CPT | Mod: HCNC

## 2019-07-19 PROCEDURE — 99900035 HC TECH TIME PER 15 MIN (STAT): Mod: HCNC

## 2019-07-19 PROCEDURE — 25000003 PHARM REV CODE 250: Mod: HCNC | Performed by: INTERNAL MEDICINE

## 2019-07-19 PROCEDURE — 36415 COLL VENOUS BLD VENIPUNCTURE: CPT | Mod: HCNC

## 2019-07-19 PROCEDURE — 80053 COMPREHEN METABOLIC PANEL: CPT | Mod: HCNC

## 2019-07-19 PROCEDURE — 27000221 HC OXYGEN, UP TO 24 HOURS: Mod: HCNC

## 2019-07-19 PROCEDURE — 94799 UNLISTED PULMONARY SVC/PX: CPT | Mod: HCNC

## 2019-07-19 PROCEDURE — 25000242 PHARM REV CODE 250 ALT 637 W/ HCPCS: Mod: HCNC | Performed by: STUDENT IN AN ORGANIZED HEALTH CARE EDUCATION/TRAINING PROGRAM

## 2019-07-19 PROCEDURE — 85025 COMPLETE CBC W/AUTO DIFF WBC: CPT | Mod: HCNC

## 2019-07-19 RX ADMIN — FLUTICASONE FUROATE AND VILANTEROL TRIFENATATE 1 PUFF: 200; 25 POWDER RESPIRATORY (INHALATION) at 07:07

## 2019-07-19 RX ADMIN — CALCIUM CARBONATE (ANTACID) CHEW TAB 500 MG 500 MG: 500 CHEW TAB at 08:07

## 2019-07-19 RX ADMIN — SODIUM BICARBONATE 650 MG TABLET 650 MG: at 08:07

## 2019-07-19 NOTE — PROGRESS NOTES
"[facility]  Pulmonology  Progress Note    Patient Name: Katie Quevedo  MRN: [id number]  Admission Date: 7/17/2019  Hospital Length of Stay: 2 days  Code Status: Full Code  Attending Provider: Rosa Maria Alexander MD  Primary Care Provider: Kingston Verduzco MD   Principal Problem: Recurrent pleural effusion on left    Subjective:     Interval History:  Had an extensive discussion with Ms. Quevedo and her daughter this morning. Ms. Quevedo states "some doctor walked in and said we are not performing the surgery (VATS and pleural biopsy)." Patient is concerned possibilities of prolonged intubation and bad outcomes from intubation on ventilation during the procedures. We discussed the rationale and indications for the procedures, in order to investigate the etiology of the pleural effusion. We discussed patient already had thoracentesis x2 in the past few months, and she will likely to continue having progressive dyspnea from pleural effusion re-accumulation in the future. Patient states she will hold off procedures for now, and "think about it."    Otherwise, patient is stable on home O2. Dyspnea improved s/p thoracentesis on 7/17.    Objective:     Vital Signs (Most Recent):  Temp: 98.5 °F (36.9 °C) (07/19/19 1115)  Pulse: 105 (07/19/19 1115)  Resp: 18 (07/19/19 1115)  BP: 133/62 (07/19/19 1115)  SpO2: 97 % (07/19/19 0751) Vital Signs (24h Range):  Temp:  [96.4 °F (35.8 °C)-98.5 °F (36.9 °C)] 98.5 °F (36.9 °C)  Pulse:  [] 105  Resp:  [16-20] 18  SpO2:  [90 %-97 %] 97 %  BP: (108-133)/(55-62) 133/62     Weight: 62.6 kg (137 lb 15.8 oz)  Body mass index is 25.04 kg/m².      Intake/Output Summary (Last 24 hours) at 7/19/2019 1133  Last data filed at 7/19/2019 1000  Gross per 24 hour   Intake 480 ml   Output 1200 ml   Net -720 ml       Physical Exam   Constitutional: She appears well-developed and well-nourished. No distress.   HENT:   Head: Normocephalic and atraumatic.   On 3L NC.   Eyes: Conjunctivae and EOM " are normal. No scleral icterus.   Neck: Normal range of motion. Neck supple.   Cardiovascular: Normal rate, regular rhythm and normal heart sounds.   No murmur heard.  Pulmonary/Chest:   Improving L lateral lower diminished breath sounds with some crackles.   Abdominal: Soft. Bowel sounds are normal. There is no tenderness.   Musculoskeletal: Normal range of motion. She exhibits no edema.   Neurological: She is alert.   Skin: Skin is warm. Capillary refill takes less than 2 seconds.       Vents: None       Lines/Drains/Airways     Peripheral Intravenous Line                 Peripheral IV - Single Lumen 07/17/19 20 G Right Antecubital 2 days                Significant Labs:    CBC/Anemia Profile:  Recent Labs   Lab 07/17/19  1524 07/18/19  0359 07/19/19  0437   WBC  --  8.07 6.08   HGB 11.6* 10.2* 10.0*   HCT  --  33.5* 32.2*   PLT  --  298 301   MCV  --  90 89   RDW  --  14.1 14.2        Chemistries:  Recent Labs   Lab 07/18/19  0359 07/19/19  0437   * 136   K 4.3 4.0    102   CO2 23 25   BUN 14 15   CREATININE 1.9* 1.7*   CALCIUM 9.4 9.3   ALBUMIN 3.2* 2.9*   PROT 6.7 6.3   BILITOT 0.6 0.4   ALKPHOS 76 71   ALT 8* 7*   AST 16 16     Microbiology Results (last 7 days)     Procedure Component Value Units Date/Time    AFB culture [[id number]] Collected:  07/17/19 1145    Order Status:  Completed Specimen:  Body Fluid from Pleural Fluid Updated:  07/18/19 2127     AFB Culture & Smear Culture in progress     AFB CULTURE STAIN No acid fast bacilli seen.    Culture, body fluid - Bactec [[id number]] Collected:  07/17/19 1145    Order Status:  Completed Specimen:  Body Fluid from Pleural Fluid Updated:  07/18/19 2012     Body Fluid Culture, Sterile No Growth to date      No Growth to date    AFB stain [[id number]] Collected:  07/17/19 1145    Order Status:  Completed Specimen:  Body Fluid from Pleural Fluid Updated:  07/17/19 2010     Direct Acid Fast No acid fast bacilli seen.    Gram stain [[id number]] Collected:   07/17/19 1145    Order Status:  Completed Specimen:  Body Fluid from Pleural Fluid Updated:  07/17/19 1954     Gram Stain Result No WBC's      No organisms seen        Pleural fluid   Pleural fluid Protein 3.9, TP 7.9 - ratio = 0.49  Pleural fluid cytology, pathology pending.    Pleural fluid studies:  Yellow, cloudy  WBC 1016  N 19%  L 66%  Mesothelial 15%    Significant Imaging:  no new imaging in the past 24 hours.    Assessment/Plan:     * Recurrent pleural effusion on left  73 yo presents with recurrent L sided pleural effusion. S/p thoracentesis x2 now. Previous thoracentesis with lymphocyte predominent and few mesothelial cells (also bloody effusion), concerning for malignancy. This thoracenetesis again with lymphocyte predominant. Ideally, we would prefer VATS with pleural biopsy to evaluate etiology for pleural effusion. However, patient expressed concerns regarding the risk of the procedures. We discussed case with patient's pulmonologist Dr. Duran. Patient will follow up with Dr. Duran at [facility] (pending confirmation of time/date, will inform patient).     - Will follow up cultures and AFB stains.   - Prefer PFT perform in house. Otherwise okay to have PFT outpatient next week. Discussed with primary team.   - Will follow up with Dr. Duran.    COPD (chronic obstructive pulmonary disease) with chronic bronchitis  Continue home Breo and oxygen  Maintain O2 sats 88% and above   duonebs PRN       Will sign off. Please call for questions.      Jimi Daley MD, HO-III  Pulmonology  [facility]

## 2019-07-19 NOTE — PLAN OF CARE
Discharge order noted, No HH or DME noted. Case Management Discharge Information Sheet reviewed with pt. TN spoke with Heidi at [facility] Pulmonary clinic to confirm appt on 7/30 at 3pm.      Discharge rounds on patient. Discussed followup appointments, blue discharge folder, discharge nurse will go over home medications and reasons for medications and final discharge instructions. All patient/caregiver questions answered. Patient verbalized understanding.    Follow-up With  Details  Why  Contact Info   Geovanni Interiano MD  On 7/30/2019  time: 3:00pm Pulmonary follow up at [facility] phone number [phone]  [address]  [address] CONTACT TYPE  [facility] [address]  [phone]           Future Appointments   Date Time Provider Department Center   7/25/2019 10:30 AM Clemencia Gambino MD [facility] [place] [facility]   7/29/2019 10:15 AM Aura Diggs MD [facility]   8/15/2019  3:00 PM Clemencia Gambino MD [facility] [place] [facility]   11/4/2019  4:00 PM Pushpa Mcmahon MD [facility]        07/19/19 1400   Final Note   Assessment Type Final Discharge Note   Anticipated Discharge Disposition Home   What phone number can be called within the next 1-3 days to see how you are doing after discharge? [phone]   Hospital Follow Up  Appt(s) scheduled? Yes   Discharge plans and expectations educations in teach back method with documentation complete? Yes   Right Care Referral Info   Post Acute Recommendation No Care   Mahin Ramirez RN-BC  Transitional Navigator  [phone]

## 2019-07-19 NOTE — PLAN OF CARE
Problem: Adult Inpatient Plan of Care  Goal: Plan of Care Review  Outcome: Revised  Pt stable.  NO distress noted. POC reviewed with pt. Pt verbalized understanding. Pt remains SR on the monitor. NO true red alarms noted. NO complaints of pain noted. Dressing to lower back CDI. Hibiclens bath given. Pt sating 93% on 3LNC.  Fall precautions maintained. Bed in lowest position, call light in reach and bed alarm on.

## 2019-07-19 NOTE — ASSESSMENT & PLAN NOTE
75 yo presents with recurrent L sided pleural effusion. S/p thoracentesis x2 now. Previous thoracentesis with lymphocyte predominent and few mesothelial cells (also bloody effusion), concerning for malignancy. This thoracenetesis again with lymphocyte predominant. Ideally, we would prefer VATS with pleural biopsy to evaluate etiology for pleural effusion. However, patient expressed concerns regarding the risk of the procedures. We discussed case with patient's pulmonologist Dr. Duran. Patient will follow up with Dr. Duran at [facility] (pending confirmation of time/date, will inform patient).     - Will follow up cultures and AFB stains.   - Prefer PFT perform in house. Otherwise okay to have PFT outpatient next week. Discussed with primary team.   - Will follow up with Dr. Duran.

## 2019-07-19 NOTE — PLAN OF CARE
Problem: Adult Inpatient Plan of Care  Goal: Plan of Care Review  Outcome: Ongoing (interventions implemented as appropriate)  On 3l nasal cannula. Pt wears home o2. No distress. Will continue to monitor.

## 2019-07-19 NOTE — PLAN OF CARE
Problem: Adult Inpatient Plan of Care  Goal: Plan of Care Review  Outcome: Outcome(s) achieved Date Met: 07/19/19  Discharge instruction and education packet provided. VN notified. IV site removed cath tip intact. Telemetry discontinued without adverse reaction. Patient shows no acute distress.  is picking up O2 concentrator for transportation to home.

## 2019-07-19 NOTE — PLAN OF CARE
Problem: Adult Inpatient Plan of Care  Goal: Plan of Care Review  Outcome: Ongoing (interventions implemented as appropriate)  Chart review done.

## 2019-07-19 NOTE — PROGRESS NOTES
[facility] IM Resident ALEAH Progress Note    Subjective:      Katie Quevedo is a 74 y.o. female who is being followed by the [facility] IM service at [facility] for L pleural effusion of unknown etiology    Pt. Doing well on 3L, reports no SOB. Complaints of no sleep o/n. Anxious about VATS and requesting something to help relax. Denies N/V, chest pain.      Objective:   Last 24 Hour Vital Signs:  BP  Min: 108/55  Max: 119/56  Temp  Av.2 °F (36.2 °C)  Min: 96.4 °F (35.8 °C)  Max: 98 °F (36.7 °C)  Pulse  Av.6  Min: 84  Max: 100  Resp  Av.6  Min: 16  Max: 20  SpO2  Av.7 %  Min: 90 %  Max: 97 %  Weight  Av.6 kg (137 lb 15.8 oz)  Min: 62.6 kg (137 lb 15.8 oz)  Max: 62.6 kg (137 lb 15.8 oz)  I/O last 3 completed shifts:  In: 600 [P.O.:600]  Out: 4600 [Urine:1000; Other:3600]    Physical Examination:   General: Alert, Awake, Oriented x 3, uncomfortable appearing elderly woman  Head: normocephalic, atraumatic  Eyes: PERRL, EOMI, no scleral icterus, no conjunctival pallor  Nose: no tenderness to palpation, no drainage or erythema appreciated  Mouth and Throat: nl dentition, no lesions noted, moist mucus membranes  Neck: no lymphadenopathy appreciated, No carotid bruits, JVD 8 cm  Respiratory: minimal crackles @ L base, no accessory use of muscles   Cardiovascular: Distant heart sounds, RRR, no murmurs or rubs appreciated  Gastrointestinal: soft, nontender, nondistended,no rebound or guarding, normoactive bowel sounds.   Extremities: pulses 2+ in all extremities, no pitting edema, normal ROM   Neuro:  full strength even in all extremities, no sensory deficits.   Skin: no lesions, rashes, or breakdown.     Laboratory:  Laboratory Data Reviewed: yes  Pertinent Findings:  Recent Labs   Lab 19  0839 19  1524 19  0359 19  0437   WBC 8.80  --  8.07 6.08   HGB 11.4* 11.6* 10.2* 10.0*   HCT 37.1  --  33.5* 32.2*     --  298 301   MCV 92  --  90 89   RDW 14.3  --  14.1 14.2    *  --  134* 136   K 4.1  --  4.3 4.0     --  101 102   CO2 20*  --  23 25   BUN 13  --  14 15   CREATININE 2.0*  --  1.9* 1.7*   GLU 84  --  98 92   PROT 7.9  --  6.7 6.3   ALBUMIN 3.7  --  3.2* 2.9*   BILITOT 0.5  --  0.6 0.4   AST 22  --  16 16   ALKPHOS 87  --  76 71   ALT 9*  --  8* 7*         Pathology Data Reviewed: yes  Pertinent Findings:  Pending    Other Results:  EKG (my interpretation): normal EKG, normal sinus rhythm, unchanged from previous tracings.    Radiology Data Reviewed: yes  Pertinent Findings:  No new    Current Medications:     Infusions:       Scheduled:   calcium carbonate  500 mg Oral BID WM    fluticasone furoate-vilanterol  1 puff Inhalation Daily    sodium bicarbonate  650 mg Oral TID        PRN:  albuterol-ipratropium, ciprofloxacin, sodium chloride 0.9%, vancomycin (VANCOCIN) IVPB    Antibiotics and Day Number of Therapy:  Ppx w/ surgery today    Lines and Day Number of Therapy:  PIV 7.19    Assessment:     Katie Quevedo is a 74 y.o.female with L pleural effusion of unknown etiology     Plan:     Dyspnea 2/2 L Pleural Effusion  - Continue home 2L Oxygen  - Pulse ox every 4 hours  - Pulm consulted; appreciate recs  - S/P Pleurocentesis; 600ml removed  - Plural cytology pending  - Surgery Consulted; VATS procedure for biopsy possibly today  - Consulted IR for pigtail catether  - CXR: moderate-sized, left pleural effusion, increased since last study.  Left upper lung zone and right lung are relatively clear.      Pericardial Effusion  - Cards consulted; appreciate recs  - Echo yesterday- small pericardial effusion, no evidence of tamponade  - No intervention at this time     Chronic Obstructive Pulmonary Disease   - Worsening dyspnea in last week, requires 2L home O2, dyspnea improved after pleurocentesis  - PFT (2018) : Obstructive and restrictive lung disease  - patients pulmonologist is Dr. Grove  - Patient takes Advair daily   - Chest X-ray: w/ L plural effusion s/p  pleurocentesis  - Duonebs q3h PRN  - Continue Home fluticasone  - O2 titrated for sats >92%     Chronic Kidney Disease Stage 4   - Patient has known CKD  - Baseline eGFR: 29  - Baseline Cr: 1.9-2.0  - eGFR today 16   - BUN/Cr today: 13/2.0  - renally dose all medication     Benign Essential Hypertension   - BP on admit 128/61  - Continue home norvasc  - continue to monitor      History of pulmonary embolism  - Holding home Apixaban 2.5 mg po BID w/ pending surgery tomorrow     Normocytic Anemia   - H/H: 11.4/37.1 on admit  - MCV: 92  - Studies: Iron 35, Ferritin 184, Folate 4.0, B12 647  - follow up daily CBC     Chronic Pain  - Takes home hydrocodone 10   - Complaining of pain and anxiety     FEN: Replete PRN  Ppx: Heparin  Diet: NPO  Dispo: Vats procedure + biopsy tomorrow    Code: FULL    Yamini Trotter MD PhD  [facility] Internal [facility] HO-II  [facility] IM Service Team A    [facility] Medicine Hospitalist Pager numbers:   [facility] Hospitalist Medicine Team A (Mitzy/Benjamin): [phone]  [facility] Hospitalist Medicine Team B (Aubrie/Judit):  [phone]

## 2019-07-19 NOTE — DISCHARGE INSTRUCTIONS
Chronic Obstructive Pulmonary Disease (COPD), Discharge Instructions (English) View Edit Remove   COPD, Treatment for (English) View Edit Remove   Pleural Effusion (English) View Edit Remove   Pleural Effusion (English) View Edit Remove   Diet, Discharge Instructions for Eating a Low-Salt (English) View Edit Remove

## 2019-07-19 NOTE — PROGRESS NOTES
Surgery Progress Note    S:  NAEO. Patient says she feels better, just like she did after her last thoracentesis 2 weeks ago. She still expresses fear about VATS procedure and states she does not want to do it at this time. Patient has been NPO overnight in anticipation for potential procedure.     O:  Temp:  [96.4 °F (35.8 °C)-98 °F (36.7 °C)] 96.5 °F (35.8 °C)  Pulse:  [] 94  Resp:  [16-20] 16  SpO2:  [90 %-97 %] 93 %  BP: ()/(55-58) 109/55    Physical Exam:  Gen: no acute distress. Alert and oriented x3.  HEENT: normocephalic and atraumatic. EOMI.   Resp: 3L NC in place, tachypneic, but improved  CV: regular rate  Abd: soft, non-tender  Ext: warm and well perfused  MSK: normal range of motion, normal strength  Neuro: no focal deficits, normal sensation    Recent Labs   Lab 07/19/19  0437   WBC 6.08   RBC 3.62*   HGB 10.0*   HCT 32.2*      MCV 89   MCH 27.6   MCHC 31.1*     Recent Labs   Lab 07/19/19  0437   CALCIUM 9.3   PROT 6.3      K 4.0   CO2 25      BUN 15   CREATININE 1.7*   ALKPHOS 71   ALT 7*   AST 16   BILITOT 0.4       A/P:    -Recommend first for IR to place pigtail catheter if patient continues to be symptomatic before proceeding to VATS with pleural biopsy and potential pleurodesis   -Patient continues to not want VATS procedure.   -Concern for possible pulmonary malignancy given re-accumulation of fluid  -Pulmonary will evaluate fluid with cytology to assess for malignancy and need for biopsy  -Incentive spirometry and chest physiotherapy pre and post operatively  -Recommend cardiology for risk stratification    Rose Patrick MD  PGY-2  [facility] General Surgery

## 2019-07-19 NOTE — PLAN OF CARE
VN note: VN cued into patient's room. Patient's  at bedside with her home oxygen. Medication list reviewed. VN inquired with Dr. Louise why patient's valium and norco were discontinued. MD came into room, informed patient that she can continue taking her medications at home as prescribed. Follow-up information given. VN also educated patient on pleural effusion, signs and symptoms, and when to seek medical attention. Patient and  verbalized understanding and all questions answered. Refer to clinical references for further education. Transport requested.

## 2019-07-19 NOTE — PROGRESS NOTES
.Pharmacy New Medication Education    Patient and/or Caregiver ACCEPTED medication education.    Learners of pharmacy medication education included:  family     Patient +/- learner response:  Verbalized Understanding, Teachback    Pharmacy educated patient on  the name and purpose of medications and possible side effects, using the teach-back method.     Medication(s):  breo    Indication:  COPD    Possible Side Effects:  Voice Hoarseness, throat irritation    In addition, the following medications were also discussed.    Current Facility-Administered Medications:     albuterol-ipratropium 2.5 mg-0.5 mg/3 mL nebulizer solution 3 mL, 3 mL, Nebulization, TID PRN, Julio JOHNSON Concepcion, DO    calcium carbonate 200 mg calcium (500 mg) chewable tablet 500 mg, 500 mg, Oral, BID WM, Rosa Maria Alexander MD, 500 mg at 07/19/19 0817    ciprofloxacin (CIPRO)400mg/200ml D5W IVPB 400 mg, 400 mg, Intravenous, On Call Procedure, Rose Patrick MD    fluticasone furoate-vilanterol 200-25 mcg/dose diskus inhaler 1 puff, 1 puff, Inhalation, Daily, Julio Concepcion, DO, 1 puff at 07/19/19 0751    sodium bicarbonate tablet 650 mg, 650 mg, Oral, TID, Julio AJae Hicksana, DO, 650 mg at 07/19/19 0817    sodium chloride 0.9% flush 10 mL, 10 mL, Intravenous, PRN, Julio Concepcion, DO    vancomycin in dextrose 5 % 1 gram/250 mL IVPB 1,000 mg, 1,000 mg, Intravenous, On Call Procedure, Rose Patrick MD

## 2019-07-19 NOTE — SUBJECTIVE & OBJECTIVE
"Interval History:  Had an extensive discussion with Ms. Quevedo and her daughter this morning. Ms. Quevedo states "some doctor walked in and said we are not performing the surgery (VATS and pleural biopsy)." Patient is concerned possibilities of prolonged intubation and bad outcomes from intubation on ventilation during the procedures. We discussed the rationale and indications for the procedures, in order to investigate the etiology of the pleural effusion. We discussed patient already had thoracentesis x2 in the past few months, and she will likely to continue having progressive dyspnea from pleural effusion re-accumulation in the future. Patient states she will hold off procedures for now, and "think about it."    Otherwise, patient is stable on home O2. Dyspnea improved s/p thoracentesis on 7/17.    Objective:     Vital Signs (Most Recent):  Temp: 98.5 °F (36.9 °C) (07/19/19 1115)  Pulse: 105 (07/19/19 1115)  Resp: 18 (07/19/19 1115)  BP: 133/62 (07/19/19 1115)  SpO2: 97 % (07/19/19 0751) Vital Signs (24h Range):  Temp:  [96.4 °F (35.8 °C)-98.5 °F (36.9 °C)] 98.5 °F (36.9 °C)  Pulse:  [] 105  Resp:  [16-20] 18  SpO2:  [90 %-97 %] 97 %  BP: (108-133)/(55-62) 133/62     Weight: 62.6 kg (137 lb 15.8 oz)  Body mass index is 25.04 kg/m².      Intake/Output Summary (Last 24 hours) at 7/19/2019 1133  Last data filed at 7/19/2019 1000  Gross per 24 hour   Intake 480 ml   Output 1200 ml   Net -720 ml       Physical Exam   Constitutional: She appears well-developed and well-nourished. No distress.   HENT:   Head: Normocephalic and atraumatic.   On 3L NC.   Eyes: Conjunctivae and EOM are normal. No scleral icterus.   Neck: Normal range of motion. Neck supple.   Cardiovascular: Normal rate, regular rhythm and normal heart sounds.   No murmur heard.  Pulmonary/Chest:   Improving L lateral lower diminished breath sounds with some crackles.   Abdominal: Soft. Bowel sounds are normal. There is no tenderness.   Musculoskeletal: " Normal range of motion. She exhibits no edema.   Neurological: She is alert.   Skin: Skin is warm. Capillary refill takes less than 2 seconds.       Vents: None       Lines/Drains/Airways     Peripheral Intravenous Line                 Peripheral IV - Single Lumen 07/17/19 20 G Right Antecubital 2 days                Significant Labs:    CBC/Anemia Profile:  Recent Labs   Lab 07/17/19  1524 07/18/19  0359 07/19/19  0437   WBC  --  8.07 6.08   HGB 11.6* 10.2* 10.0*   HCT  --  33.5* 32.2*   PLT  --  298 301   MCV  --  90 89   RDW  --  14.1 14.2        Chemistries:  Recent Labs   Lab 07/18/19  0359 07/19/19  0437   * 136   K 4.3 4.0    102   CO2 23 25   BUN 14 15   CREATININE 1.9* 1.7*   CALCIUM 9.4 9.3   ALBUMIN 3.2* 2.9*   PROT 6.7 6.3   BILITOT 0.6 0.4   ALKPHOS 76 71   ALT 8* 7*   AST 16 16     Microbiology Results (last 7 days)     Procedure Component Value Units Date/Time    AFB culture [[id number]] Collected:  07/17/19 1145    Order Status:  Completed Specimen:  Body Fluid from Pleural Fluid Updated:  07/18/19 2127     AFB Culture & Smear Culture in progress     AFB CULTURE STAIN No acid fast bacilli seen.    Culture, body fluid - Bactec [[id number]] Collected:  07/17/19 1145    Order Status:  Completed Specimen:  Body Fluid from Pleural Fluid Updated:  07/18/19 2012     Body Fluid Culture, Sterile No Growth to date      No Growth to date    AFB stain [[id number]] Collected:  07/17/19 1145    Order Status:  Completed Specimen:  Body Fluid from Pleural Fluid Updated:  07/17/19 2010     Direct Acid Fast No acid fast bacilli seen.    Gram stain [[id number]] Collected:  07/17/19 1145    Order Status:  Completed Specimen:  Body Fluid from Pleural Fluid Updated:  07/17/19 1954     Gram Stain Result No WBC's      No organisms seen        Pleural fluid   Pleural fluid Protein 3.9, TP 7.9 - ratio = 0.49  Pleural fluid cytology, pathology pending.    Pleural fluid studies:  Yellow, cloudy  WBC 1016  N  19%  L 66%  Mesothelial 15%    Significant Imaging:  no new imaging in the past 24 hours.

## 2019-07-20 NOTE — DISCHARGE SUMMARY
[facility] Discharge Summary    Primary Team: [facility] Team A  Attending Physician: Rosa Maria Alexander MD  Resident: Jamey  Intern: Rose Marie    Date of Admit: 7/17/2019  Date of Discharge: 7/19/2019    Discharge to: Home  Condition: Stable    Discharge Diagnoses     Patient Active Problem List   Diagnosis    Melanoma    Chronic pain    Vitamin deficiency    Cervical post-laminectomy syndrome    Lumbar postlaminectomy syndrome    Chronic neck pain    Lumbosacral spondylosis without myelopathy    Isolated cervical dystonia    Primary osteoarthritis of both knees    Subdeltoid bursitis, L>R.    Other fragments of torsion dystonia    Nuclear sclerosis - Both Eyes    Rotator cuff tear, right    Intractable migraine    Biceps tendonitis    COPD (chronic obstructive pulmonary disease) with chronic bronchitis    Osteoarthritis, hip, bilateral    Lumbar radiculopathy, BLE    Cervical radiculopathy, BUE    Osteoporosis    Chronic low back pain    Iron deficiency anemia    Essential hypertension    Atrophy of left kidney    Kidney cysts    History of colon polyps    Current use of long term anticoagulation    Pulmonary infarct    Medical non-compliance    Acute on chronic respiratory failure with hypoxia    Recurrent pleural effusion on left    Pericardial effusion    Shortness of breath    Chronic respiratory failure with hypoxia    CKD (chronic kidney disease), stage IV    Anemia, chronic renal failure, stage 4 (severe)       Consultants and Procedures     Consultants:  Pulmonology, Surgery    Procedures:   S/P Pleurocentesis w/o complications; 600ml removed    Brief History of Present Illness      Katie Quevedo is a 74 y.o. female who  has a past medical history of Anemia, Bilateral renal cysts, Cataract, Chronic LBP (7/26/2012), Chronic pain, CKD stage V, COPD, HTN, Lumbar spondylosis, Melanoma, Seizures, Ulcer, and Vitamin D deficiency disease. The patient presented to [facility] on 7/17/2019 with a primary complaint of Shortness of Breath.     Was admitted 3 weeks prior to 7/17 with similar symptoms and had pleurocentesis at that time, cytology returned negative for malignancy. Interval improvement in SOB but within the last week dyspnea returned. Unable to walk 20ft w/o significant SOB. Home O2 2L.     For the full HPI please refer to the History & Physical from this admission.    Hospital Course By Problem with Pertinent Findings     Dyspnea 2/2 L Pleural Effusion  - Continue home 2L Oxygen  - S/P Pleurocentesis; 600ml removed  - Plural cytology pending, likely VATS procedure for biopsy after cytology results  - CXR: moderate-sized, left pleural effusion, increased since last study.  Left upper lung zone and right lung are relatively clear.      Pericardial Effusion  - Cards consulted; appreciate recs  - W/ small pericardial effusion, no evidence of tamponade  - No intervention at this time     Chronic Obstructive Pulmonary Disease w/ Chronic Hypoxic Resp Failure on 2L home O2  - Worsening dyspnea in last week, requires 2L home O2, dyspnea improved after pleurocentesis  - PFT (2018) : Obstructive and restrictive lung disease  - Pt will follow up with Dr. Grove  - Chest X-ray: w/ L plural effusion s/p pleurocentesis  - Continue Home fluticasone and Advair      Chronic Kidney Disease Stage 4   - Patient has known CKD  - Baseline eGFR: 29; Baseline Cr: 1.9-2.0  - eGFR 16 and BUN/Cr 13/2.0 on admit    Chronic HFrEF; compensated   - Pt w/ trace edema bilaterally  - Echo: EF 55%, Grade II LV diastolic dysfunction, no tamponade     Benign Essential Hypertension   - BP on admit 128/61, well controlled  - Continue home norvasc     History of pulmonary embolism  - Continue home medication     Normocytic Anemia   - H/H: 11.4/37.1 on admit  - MCV: 92  - Studies: Iron 35, Ferritin 184, Folate 4.0, B12 647     Chronic Pain  - Pt no longer taking hydrocodone or diazepam      Discharge  Medications        Medication List      CONTINUE taking these medications    amLODIPine 10 MG tablet  Commonly known as:  NORVASC     CALCIUM 500 + D 500 mg(1,250mg) -200 unit per tablet  Generic drug:  calcium-vitamin D3     DULoxetine 30 MG capsule  Commonly known as:  CYMBALTA  Take 1 capsule (30 mg total) by mouth once daily.     ergocalciferol 50,000 unit Cap  Commonly known as:  ERGOCALCIFEROL  Take 1 capsule (50,000 Units total) by mouth every 7 days. 67729 units weekly - 12 weeks, then monthly.     ferrous sulfate 325 (65 FE) MG EC tablet  Take 1 tablet (325 mg total) by mouth 2 (two) times daily.     fluticasone-salmeterol 500-50 mcg/dose 500-50 mcg/dose Dsdv diskus inhaler  Commonly known as:  ADVAIR DISKUS  Inhale 1 puff into the lungs 2 (two) times daily. Controller     * ipratropium-albuterol  mcg/actuation inhaler  Commonly known as:  COMBIVENT  Inhale 1 puff into the lungs every 4 (four) hours as needed for Wheezing. Rescue     * albuterol-ipratropium 2.5 mg-0.5 mg/3 mL nebulizer solution  Commonly known as:  DUO-NEB  Take 3 mLs by nebulization 3 (three) times daily as needed for Wheezing or Shortness of Breath. Rescue     sodium bicarbonate 650 MG tablet  Take 1 tablet (650 mg total) by mouth 3 (three) times daily.     traZODone 50 MG tablet  Commonly known as:  DESYREL  Take 1 tablet (50 mg total) by mouth every evening.     zolpidem 5 MG Tab  Commonly known as:  AMBIEN         * This list has 2 medication(s) that are the same as other medications prescribed for you. Read the directions carefully, and ask your doctor or other care provider to review them with you.            STOP taking these medications    diazePAM 2 MG tablet  Commonly known as:  VALIUM     HYDROcodone-acetaminophen  mg per tablet  Commonly known as:  NORCO            Discharge Information:   Diet:  Regular    Physical Activity:  As tolerated    Instructions:  1. Take all medications as prescribed  2. Keep all  follow-up appointments  3. Return to the hospital or call your primary care physicians if any worsening symptoms such as shortness of breath occur.      Follow-Up Appointments:  Future Appointments   Date Time Provider Department Center   7/25/2019 10:30 AM Clemencia Gambino MD [facility]   7/29/2019 10:15 AM Aura Diggs MD [facility]   8/15/2019  3:00 PM Clemencia Gambino MD [facility]   11/4/2019  4:00 PM Pushpa Mcmahon MD [facility] Dejan Trotter MD PhD  [facility] Internal Medicine, \[facility]\""

## 2019-07-22 ENCOUNTER — PATIENT OUTREACH (OUTPATIENT)
Dept: ADMINISTRATIVE | Facility: CLINIC | Age: 74
End: 2019-07-22

## 2019-07-22 DIAGNOSIS — J44.9 CHRONIC OBSTRUCTIVE PULMONARY DISEASE, UNSPECIFIED COPD TYPE: Primary | ICD-10-CM

## 2019-07-22 LAB — BACTERIA FLD CULT: NORMAL

## 2019-07-22 NOTE — PROGRESS NOTES
According to d\c notes it states to stop Valium and Norco. Pt states that she spoke with nurse at discharge and was told ok to take.

## 2019-07-24 DIAGNOSIS — G89.29 CHRONIC NECK PAIN: ICD-10-CM

## 2019-07-24 DIAGNOSIS — M54.2 CHRONIC NECK PAIN: ICD-10-CM

## 2019-07-24 DIAGNOSIS — M16.0 PRIMARY OSTEOARTHRITIS OF BOTH HIPS: ICD-10-CM

## 2019-07-24 DIAGNOSIS — G89.29 CHRONIC MIDLINE LOW BACK PAIN WITH RIGHT-SIDED SCIATICA: ICD-10-CM

## 2019-07-24 DIAGNOSIS — M54.41 CHRONIC MIDLINE LOW BACK PAIN WITH RIGHT-SIDED SCIATICA: ICD-10-CM

## 2019-07-24 RX ORDER — HYDROCODONE BITARTRATE AND ACETAMINOPHEN 10; 325 MG/1; MG/1
1 TABLET ORAL 3 TIMES DAILY PRN
Qty: 90 TABLET | Refills: 0 | Status: SHIPPED | OUTPATIENT
Start: 2019-07-25 | End: 2019-08-26 | Stop reason: SDUPTHER

## 2019-07-24 NOTE — TELEPHONE ENCOUNTER
Last Rx refill-----06/25/19  Last office visit--06/25/19  Next office visit--11/04/19        ----- Message from Summer Gill sent at 7/24/2019 12:38 PM CDT -----  Rx Refill/Request     Is this a Refill or New Rx:  Refill    Rx Name and Strength:  hydrocodone-acetaminophen 10-325mg (NORCO)  mg Tab    Preferred Pharmacy with phone number:     [id number]  [place] [facility] [id number]  [address] [place] [address]  Phone: [phone] Fax: [phone]      Communication Preference:pt @ [phone]  Additional Information:

## 2019-07-25 ENCOUNTER — OFFICE VISIT (OUTPATIENT)
Dept: PRIMARY CARE CLINIC | Facility: CLINIC | Age: 74
End: 2019-07-25
Payer: MEDICARE

## 2019-07-25 ENCOUNTER — APPOINTMENT (OUTPATIENT)
Dept: LAB | Facility: HOSPITAL | Age: 74
End: 2019-07-25
Attending: INTERNAL MEDICINE
Payer: MEDICARE

## 2019-07-25 VITALS
WEIGHT: 133.19 LBS | HEART RATE: 102 BPM | BODY MASS INDEX: 24.16 KG/M2 | DIASTOLIC BLOOD PRESSURE: 85 MMHG | TEMPERATURE: 97 F | SYSTOLIC BLOOD PRESSURE: 150 MMHG | OXYGEN SATURATION: 94 %

## 2019-07-25 DIAGNOSIS — J44.89 COPD (CHRONIC OBSTRUCTIVE PULMONARY DISEASE) WITH CHRONIC BRONCHITIS: Chronic | ICD-10-CM

## 2019-07-25 DIAGNOSIS — J96.11 CHRONIC RESPIRATORY FAILURE WITH HYPOXIA: ICD-10-CM

## 2019-07-25 DIAGNOSIS — I31.39 PERICARDIAL EFFUSION: ICD-10-CM

## 2019-07-25 DIAGNOSIS — J90 RECURRENT PLEURAL EFFUSION ON LEFT: Primary | ICD-10-CM

## 2019-07-25 PROBLEM — Z79.01 CURRENT USE OF LONG TERM ANTICOAGULATION: Status: RESOLVED | Noted: 2018-03-16 | Resolved: 2019-07-25

## 2019-07-25 PROBLEM — J96.21 ACUTE ON CHRONIC RESPIRATORY FAILURE WITH HYPOXIA: Status: RESOLVED | Noted: 2019-06-19 | Resolved: 2019-07-25

## 2019-07-25 PROCEDURE — 99999 PR PBB SHADOW E&M-EST. PATIENT-LVL III: ICD-10-PCS | Mod: PBBFAC,HCNC,, | Performed by: INTERNAL MEDICINE

## 2019-07-25 PROCEDURE — 99999 PR PBB SHADOW E&M-EST. PATIENT-LVL III: CPT | Mod: PBBFAC,HCNC,, | Performed by: INTERNAL MEDICINE

## 2019-07-25 PROCEDURE — 99496 TRANSITIONAL CARE MANAGE SERVICE 7 DAY DISCHARGE: ICD-10-PCS | Mod: HCNC,S$GLB,, | Performed by: INTERNAL MEDICINE

## 2019-07-25 PROCEDURE — 99496 TRANSJ CARE MGMT HIGH F2F 7D: CPT | Mod: HCNC,S$GLB,, | Performed by: INTERNAL MEDICINE

## 2019-07-25 NOTE — PATIENT INSTRUCTIONS
Follow-up With   Details   Why   Contact Info   Geovanni Interiano MD   On 7/30/2019   time: 3:00pm Pulmonary follow up at [facility] phone number [phone]   [address] [place] [address] CONTACT TYPE  [facility] [id number] [phone]

## 2019-07-25 NOTE — PROGRESS NOTES
[facility]  New Visit Progress Note   Recent Hospital Discharge     PRESENTING HISTORY     Chief Complaint/Reason for Admission:  Follow up Hospital Discharge   PCP: Kingston Verduzco MD    History of Present Illness:  Ms. Katie Quevedo is a 74 y.o. female who was recently admitted to the hospital.    [facility] Discharge Summary  Primary Team: [facility] Team A  Attending Physician: Rosa Maria Alexander MD  Resident: Jamey  Intern: Rose Marie  Date of Admit: 7/17/2019  Date of Discharge: 7/19/2019  Discharge to: Home  Condition: Stable  __________________________________________________________________    Today:  [facility] to [facility] for [facility] follow up.  Recently hospitalized for dyspnea related to recurrent left pleural effusion.  Initial hospitalization 6/19/19 for left pleural effusion- underwent thoracentesis with removal of 1.2 L pleural fluid.  Cytology NEG for malignant cells.   Re-hospitalized 7/17/19 with recurrent left pleural effusion- 600 ml fluid removed by thoracentesis.  Cytology NEG for malignant cells.   No microbial growth on second specimen.  AFB Cx's still pending of both specimens.  Hospital team initially had plan for General Surgery pleural biopsy, but decided to defer this until pleural cytology specimen returned.   Patient discharged home with plans for outpatient Pulmonary follow up.    She is accompanied today by her  who has remained in the waiting room.  Patient uses motorized scooter for ambulation.  Supplemental oxygen in place at 2L/min which is her baseline.  Patient reports compliance with all medication.  Continues to have dyspnea with minimal exertion, and this is unchanged from her baseline.   No cough, no fever, no chills.  No chest pain.  She plans to keep her upcoming Pulmonary appt at [facility] on 7/30/19.     Review of Systems  General ROS: negative for chills, fever or weight loss  Psychological ROS: negative for hallucination, depression or suicidal  ideation  Ophthalmic ROS: negative for blurry vision, photophobia or eye pain  ENT ROS: negative for epistaxis, sore throat or rhinorrhea  Respiratory ROS: no cough, or wheezing, + shortness of breath   Cardiovascular ROS: no chest pain + dyspnea on exertion  Gastrointestinal ROS: no abdominal pain, change in bowel habits, or black/ bloody stools  Genito-Urinary ROS: no dysuria, trouble voiding, or hematuria  Musculoskeletal ROS: negative for gait disturbance or muscular weakness  Neurological ROS: no syncope or seizures; no ataxia  Dermatological ROS: negative for pruritis, rash and jaundice    PAST HISTORY:     Past Medical History:   Diagnosis Date    Anemia     Bilateral renal cysts     Cataract     Chronic LBP 7/26/2012    Chronic pain     CKD (chronic kidney disease), stage V     COPD (chronic obstructive pulmonary disease)     Dehydration     Encounter for blood transfusion     HTN (hypertension)     Lumbar spondylosis     Melanoma     of the lip    Metabolic bone disease     Migraines, neuralgic     Osteoporosis     Primary osteoarthritis of both knees     s/p Rt TKA    Pulmonary embolism with infarction     Seizures     Subdeltoid bursitis, L>R. 9/27/2012    Ulcer     Vitamin D deficiency disease        Past Surgical History:   Procedure Laterality Date    BLADDER SUSPENSION      CATARACT EXTRACTION  11/18/13    left eye    CERVICAL LAMINECTOMY      x3, fusion x1    COLONOSCOPY  2009    HYSTERECTOMY      INSERTION, IOL PROSTHESIS Left 11/18/2013    [facility]    INSERTION, IOL PROSTHESIS Right 10/28/2013    [facility]    JOINT REPLACEMENT  2001    total right knee     LUMBAR LAMINECTOMY      x 3, fusion x1    OOPHORECTOMY      PHACOEMULSIFICATION, CATARACT Left 11/18/2013    [facility]    PHACOEMULSIFICATION, CATARACT Right 10/28/2013    [facility]        Family History   Problem Relation Age of Onset    Arthritis Mother     Stroke Mother     Hypertension Father     Cancer Father     Cataracts Father     Diabetes Maternal Aunt     Hypertension Maternal Grandfather     Heart disease Maternal Grandfather     Heart attack Maternal Grandfather     Cataracts Sister     Glaucoma Cousin          MEDICATIONS & ALLERGIES:     Current Outpatient Medications on File Prior to Visit   Medication Sig Dispense Refill    albuterol-ipratropium (DUO-NEB) 2.5 mg-0.5 mg/3 mL nebulizer solution Take 3 mLs by nebulization 3 (three) times daily as needed for Wheezing or Shortness of Breath. Rescue 1 Box 11    amLODIPine (NORVASC) 10 MG tablet Take 10 mg by mouth once daily.      calcium-vitamin D3 (CALCIUM 500 + D) 500 mg(1,250mg) -200 unit per tablet Take 1 tablet by mouth 2 (two) times daily with meals.      DULoxetine (CYMBALTA) 30 MG capsule Take 1 capsule (30 mg total) by mouth once daily. 30 capsule 3    ergocalciferol (ERGOCALCIFEROL) 50,000 unit Cap Take 1 capsule (50,000 Units total) by mouth every 7 days. 36283 units weekly - 12 weeks, then monthly. 30 capsule 0    ferrous sulfate 325 (65 FE) MG EC tablet Take 1 tablet (325 mg total) by mouth 2 (two) times daily.  0    fluticasone-salmeterol 500-50 mcg/dose (ADVAIR DISKUS) 500-50 mcg/dose DsDv diskus inhaler Inhale 1 puff into the lungs 2 (two) times daily. Controller 60 each 11    HYDROcodone-acetaminophen (NORCO)  mg per tablet Take 1 tablet by mouth 3 (three) times daily as needed. 90 tablet 0    ipratropium-albuterol (COMBIVENT)  mcg/actuation inhaler Inhale 1 puff into the lungs every 4 (four) hours as needed for Wheezing. Rescue 1 Package 3    sodium bicarbonate 650 MG tablet Take 1 tablet (650 mg total) by mouth 3 (three) times daily. 120 tablet 11    traZODone (DESYREL) 50 MG tablet Take 1 tablet (50 mg total) by mouth every evening. 30 tablet 11    zolpidem (AMBIEN) 5 MG Tab Take  5 mg by mouth once daily.  5     No current facility-administered medications on file prior to visit.         Review of patient's allergies indicates:   Allergen Reactions    Aspirin      Other reaction(s): hx of ulcers    Tetracycline Swelling     Other reaction(s): Swelling    Penicillins Rash     Other reaction(s): Hives  Other reaction(s): Rash  Other reaction(s): Rash  Other reaction(s): Hives       OBJECTIVE:     Vital Signs:  BP (!) 150/85 (BP Location: Left arm, Patient Position: Sitting, BP Method: Small (Automatic))   Pulse 102   Temp 97 °F (36.1 °C) (Oral)   Wt 60.4 kg (133 lb 2.5 oz)   SpO2 (!) 94%   BMI 24.16 kg/m²   Wt Readings from Last 1 Encounters:   07/19/19 0500 62.6 kg (137 lb 15.8 oz)   07/17/19 2121 60.9 kg (134 lb 3 oz)   07/17/19 1527 (!) 138 kg (304 lb 3.8 oz)   07/17/19 0815 61.7 kg (136 lb)     Body mass index is 24.16 kg/m².        Physical Exam:  BP (!) 150/85 (BP Location: Left arm, Patient Position: Sitting, BP Method: Small (Automatic))   Pulse 102   Temp 97 °F (36.1 °C) (Oral)   Wt 60.4 kg (133 lb 2.5 oz)   SpO2 (!) 94%   BMI 24.16 kg/m²   General appearance: alert, cooperative, no distress  + NC supplemental oxygen in place   Constitutional:Oriented to person, place, and time  + appears well-developed and well-nourished.   HEENT: Normocephalic, atraumatic, neck symmetrical, no nasal discharge   Eyes: conjunctivae/corneas clear, PERRL, EOM's intact  Lungs:+ poor inspiratory effort, no wheezing   Heart: regular rate and rhythm without rub; no displacement of the PMI   Abdomen: soft, non-tender; bowel sounds normoactive; no organomegaly  Extremities: extremities symmetric; no clubbing, cyanosis, or edema  Integument: Skin color, texture, turgor normal; no rashes; hair distrubution normal  Neurologic: Alert and oriented X 3, normal strength, normal coordination and gait  Psychiatric: no pressured speech; normal affect; no evidence of impaired cognition     Laboratory  Lab  Results   Component Value Date    WBC 6.08 07/19/2019    HGB 10.0 (L) 07/19/2019    HCT 32.2 (L) 07/19/2019    MCV 89 07/19/2019     07/19/2019     BMP  Lab Results   Component Value Date     07/19/2019    K 4.0 07/19/2019     07/19/2019    CO2 25 07/19/2019    BUN 15 07/19/2019    CREATININE 1.7 (H) 07/19/2019    CALCIUM 9.3 07/19/2019    ANIONGAP 9 07/19/2019    ESTGFRAFRICA 33 (A) 07/19/2019    EGFRNONAA 29 (A) 07/19/2019     Lab Results   Component Value Date    ALT 7 (L) 07/19/2019    AST 16 07/19/2019    ALKPHOS 71 07/19/2019    BILITOT 0.4 07/19/2019     Lab Results   Component Value Date    INR 1.0 07/17/2019    INR 1.1 12/26/2018    INR 0.9 03/05/2018     Lab Results   Component Value Date    HGBA1C 5.2 01/22/2018       Diagnostic Results:  TTE 7/17/19:  · Normal left ventricular systolic function. The estimated ejection fraction is 55%  · Grade II (moderate) left ventricular diastolic dysfunction consistent with pseudonormalization.  · Elevated left atrial pressure.  · Normal right ventricular systolic function.  · Normal central venous pressure (3 mm Hg).  · There is a large left pleural effusion.  · Small pericardial effusion. No tamponade. No RA or RV collapse is seen. There is >30% Mitral inflow velocity variation with respiration.     Chest X Ray 7/17/19:  There is moderate-sized, left layering pleural effusion, increased in the interval.  Left upper lung zone and right lung are relatively clear.  Cardiomediastinal silhouette is similar.  No acute osseous abnormality.      TRANSITION OF CARE:     [facility] On Call Contact Note: 7/22/19    Family and/or Caretaker present at visit?  Yes.  Diagnostic tests reviewed/disposition: I have reviewed all completed as well as pending diagnostic tests at the time of discharge.  Disease/illness education: Yes  Home health/community services discussion/referrals: Patient does not have home health established from hospital visit.  They do not need  home health.  If needed, we will set up home health for the patient.   Establishment or re-establishment of referral orders for community resources: No other necessary community resources.   Discussion with other health care providers: No discussion with other health care providers necessary.     ASSESSMENT & PLAN:       Recurrent pleural effusion on left  Pericardial effusion  - has required thoracentesis twice in past month  - no growth on microbial pleural fluid Cx, cytology NEG for malignant cells, AFB Cx pending   - significant smoking hx noted and patient presently with advanced COPD  - awaiting pulmonary consultation 7/30/19     Chronic respiratory failure with hypoxia  COPD (chronic obstructive pulmonary disease) with chronic bronchitis      I will [facility].     Instructions for the patient:      Scheduled Follow-up :  Future Appointments   Date Time Provider Department Center   7/25/2019 12:50 PM SAME DAY [facility]   7/25/2019  1:00 PM SAME DAY LAB, [facility]   7/29/2019 10:15 AM Aura Diggs MD [facility]   8/15/2019  3:00 PM Clemencia Gambino MD [facility]   11/4/2019  4:00 PM Pushpa Mcmahon MD [facility]       Post Visit Medication List:     Medication List           Accurate as of 7/25/19 12:16 PM. If you have any questions, ask your nurse or doctor.               CONTINUE taking these medications    amLODIPine 10 MG tablet  Commonly known as:  NORVASC     CALCIUM 500 + D 500 mg(1,250mg) -200 unit per tablet  Generic drug:  calcium-vitamin D3     DULoxetine 30 MG capsule  Commonly known as:  CYMBALTA  Take 1 capsule (30 mg total) by mouth once daily.     ergocalciferol 50,000 unit Cap  Commonly known as:  ERGOCALCIFEROL  Take 1 capsule (50,000 Units total) by mouth every 7 days. 81947 units weekly - 12 weeks, then monthly.     ferrous sulfate 325 (65 FE) MG EC tablet  Take 1 tablet  (325 mg total) by mouth 2 (two) times daily.     fluticasone-salmeterol 500-50 mcg/dose 500-50 mcg/dose Dsdv diskus inhaler  Commonly known as:  ADVAIR DISKUS  Inhale 1 puff into the lungs 2 (two) times daily. Controller     HYDROcodone-acetaminophen  mg per tablet  Commonly known as:  NORCO  Take 1 tablet by mouth 3 (three) times daily as needed.     * ipratropium-albuterol  mcg/actuation inhaler  Commonly known as:  COMBIVENT  Inhale 1 puff into the lungs every 4 (four) hours as needed for Wheezing. Rescue     * albuterol-ipratropium 2.5 mg-0.5 mg/3 mL nebulizer solution  Commonly known as:  DUO-NEB  Take 3 mLs by nebulization 3 (three) times daily as needed for Wheezing or Shortness of Breath. Rescue     sodium bicarbonate 650 MG tablet  Take 1 tablet (650 mg total) by mouth 3 (three) times daily.     traZODone 50 MG tablet  Commonly known as:  DESYREL  Take 1 tablet (50 mg total) by mouth every evening.     zolpidem 5 MG Tab  Commonly known as:  AMBIEN         * This list has 2 medication(s) that are the same as other medications prescribed for you. Read the directions carefully, and ask your doctor or other care provider to review them with you.                Signing Physician:  Clemencia Gambino MD

## 2019-07-29 ENCOUNTER — HOSPITAL ENCOUNTER (EMERGENCY)
Facility: HOSPITAL | Age: 74
Discharge: HOME | End: 2019-07-29
Attending: EMERGENCY MEDICINE
Payer: MEDICARE

## 2019-07-29 VITALS
DIASTOLIC BLOOD PRESSURE: 62 MMHG | HEART RATE: 104 BPM | WEIGHT: 133 LBS | BODY MASS INDEX: 24.48 KG/M2 | OXYGEN SATURATION: 94 % | HEIGHT: 62 IN | TEMPERATURE: 98 F | RESPIRATION RATE: 35 BRPM | SYSTOLIC BLOOD PRESSURE: 132 MMHG

## 2019-07-29 DIAGNOSIS — J90 PLEURAL EFFUSION, LEFT: Primary | ICD-10-CM

## 2019-07-29 DIAGNOSIS — R06.02 SOB (SHORTNESS OF BREATH): ICD-10-CM

## 2019-07-29 DIAGNOSIS — J44.1 COPD EXACERBATION: ICD-10-CM

## 2019-07-29 LAB
ALBUMIN SERPL BCP-MCNC: 3.9 G/DL (ref 3.5–5.2)
ALP SERPL-CCNC: 84 U/L (ref 55–135)
ALT SERPL W/O P-5'-P-CCNC: 6 U/L (ref 10–44)
ANION GAP SERPL CALC-SCNC: 16 MMOL/L (ref 8–16)
AST SERPL-CCNC: 19 U/L (ref 10–40)
BILIRUB SERPL-MCNC: 0.5 MG/DL (ref 0.1–1)
BILIRUB UR QL STRIP: NEGATIVE
BNP SERPL-MCNC: 247 PG/ML (ref 0–99)
BUN SERPL-MCNC: 8 MG/DL (ref 8–23)
CALCIUM SERPL-MCNC: 10.1 MG/DL (ref 8.7–10.5)
CHLORIDE SERPL-SCNC: 98 MMOL/L (ref 95–110)
CLARITY UR: CLEAR
CO2 SERPL-SCNC: 21 MMOL/L (ref 23–29)
COLOR UR: YELLOW
CREAT SERPL-MCNC: 1.4 MG/DL (ref 0.5–1.4)
ERYTHROCYTE [DISTWIDTH] IN BLOOD BY AUTOMATED COUNT: 14.3 % (ref 11.5–14.5)
EST. GFR  (AFRICAN AMERICAN): 42 ML/MIN/1.73 M^2
EST. GFR  (NON AFRICAN AMERICAN): 37 ML/MIN/1.73 M^2
GLUCOSE SERPL-MCNC: 88 MG/DL (ref 70–110)
GLUCOSE UR QL STRIP: NEGATIVE
HCT VFR BLD AUTO: 36.4 % (ref 37–48.5)
HGB BLD-MCNC: 11.1 G/DL (ref 12–16)
HGB UR QL STRIP: ABNORMAL
KETONES UR QL STRIP: NEGATIVE
LEUKOCYTE ESTERASE UR QL STRIP: NEGATIVE
MCH RBC QN AUTO: 27.6 PG (ref 27–31)
MCHC RBC AUTO-ENTMCNC: 30.5 G/DL (ref 32–36)
MCV RBC AUTO: 91 FL (ref 82–98)
NITRITE UR QL STRIP: NEGATIVE
PH UR STRIP: 6 [PH] (ref 5–8)
PLATELET # BLD AUTO: 370 K/UL (ref 150–350)
PMV BLD AUTO: 9.9 FL (ref 9.2–12.9)
POTASSIUM SERPL-SCNC: 3.9 MMOL/L (ref 3.5–5.1)
PROT SERPL-MCNC: 7.6 G/DL (ref 6–8.4)
PROT UR QL STRIP: NEGATIVE
RBC # BLD AUTO: 4.02 M/UL (ref 4–5.4)
SODIUM SERPL-SCNC: 135 MMOL/L (ref 136–145)
SP GR UR STRIP: <=1.005 (ref 1–1.03)
TROPONIN I SERPL DL<=0.01 NG/ML-MCNC: 0.02 NG/ML (ref 0–0.03)
URN SPEC COLLECT METH UR: ABNORMAL
UROBILINOGEN UR STRIP-ACNC: NEGATIVE EU/DL
WBC # BLD AUTO: 7.24 K/UL (ref 3.9–12.7)

## 2019-07-29 PROCEDURE — 93010 EKG 12-LEAD: ICD-10-PCS | Mod: HCNC,,, | Performed by: INTERNAL MEDICINE

## 2019-07-29 PROCEDURE — 94640 AIRWAY INHALATION TREATMENT: CPT | Mod: HCNC

## 2019-07-29 PROCEDURE — 93010 ELECTROCARDIOGRAM REPORT: CPT | Mod: HCNC,,, | Performed by: INTERNAL MEDICINE

## 2019-07-29 PROCEDURE — 80053 COMPREHEN METABOLIC PANEL: CPT | Mod: HCNC

## 2019-07-29 PROCEDURE — 81003 URINALYSIS AUTO W/O SCOPE: CPT | Mod: HCNC

## 2019-07-29 PROCEDURE — 93005 ELECTROCARDIOGRAM TRACING: CPT | Mod: HCNC

## 2019-07-29 PROCEDURE — 84484 ASSAY OF TROPONIN QUANT: CPT | Mod: HCNC

## 2019-07-29 PROCEDURE — 25000242 PHARM REV CODE 250 ALT 637 W/ HCPCS: Mod: HCNC | Performed by: EMERGENCY MEDICINE

## 2019-07-29 PROCEDURE — 85027 COMPLETE CBC AUTOMATED: CPT | Mod: HCNC

## 2019-07-29 PROCEDURE — 83880 ASSAY OF NATRIURETIC PEPTIDE: CPT | Mod: HCNC

## 2019-07-29 PROCEDURE — 99285 EMERGENCY DEPT VISIT HI MDM: CPT | Mod: 25,HCNC

## 2019-07-29 RX ORDER — IPRATROPIUM BROMIDE AND ALBUTEROL SULFATE 2.5; .5 MG/3ML; MG/3ML
3 SOLUTION RESPIRATORY (INHALATION)
Status: COMPLETED | OUTPATIENT
Start: 2019-07-29 | End: 2019-07-29

## 2019-07-29 RX ADMIN — IPRATROPIUM BROMIDE AND ALBUTEROL SULFATE 3 ML: .5; 3 SOLUTION RESPIRATORY (INHALATION) at 01:07

## 2019-07-29 NOTE — ED NOTES
MD at the bedside. Respiratory at the bedside for breathing treatment.  Pt aware of care of plan to see how she feels after breathing tx, for possible discharge.  Pt has appt with pulmonology at [facility] in the morning.  VSS,  at bedside, will continue to monitor

## 2019-07-29 NOTE — ED TRIAGE NOTES
Pt came to the ED with complaints of shortness of breath.  Pt has history of fluid on the lungs, COPD.  Was here on 7/17, thoracentesis was done, and she was admitted for a VATS procedure.  Pt refused the procedure due to not wanting to be intubated for the procedure.

## 2019-07-29 NOTE — ED PROVIDER NOTES
Encounter Date: 7/29/2019    SCRIBE #1 NOTE: I, Katie Camacho, am scribing for, and in the presence of,  Dr. Green. I have scribed the entire note.       History     Chief Complaint   Patient presents with    Shortness of Breath     increased SOB that began this am.  Denies cough or congestion.  Hx of COPD and Home O2 dependent.  Denies CP     Katie Quevedo is a 74 y.o. female who  has a past medical history of Anemia, Bilateral renal cysts, Cataract, Chronic LBP (7/26/2012), Chronic pain, CKD (chronic kidney disease), stage V, COPD (chronic obstructive pulmonary disease), Dehydration, Encounter for blood transfusion, HTN (hypertension), Lumbar spondylosis, Melanoma, Metabolic bone disease, Migraines, neuralgic, Osteoporosis, Primary osteoarthritis of both knees, Pulmonary embolism with infarction, Seizures, Subdeltoid bursitis, L>R. (9/27/2012), Ulcer, and Vitamin D deficiency disease.    The patient presents to the ED due to shortness of breath.  She reports her symptoms have been gradually worsening over the last 2 days. She feels fine at rest but reports significant dyspnea on exertion. She was at her Nephrology appointment earlier today, and  states she appeared lightheaded and almost passed out, so he decided to bring her in for evaluation.  She has recent history of recurrent L pleural effusion, requiring thoracentesis, most recently on 7/17.   She denies CP, fever, cough, lower extremity pain/swelling.  She has history of COPD, on home O2. She has an appointment with her pulmonary doctor tomorrow.   Patient notes her symptoms have improved upon arrival and has no other complaints at the moment.    The history is provided by the patient and the spouse.     Review of patient's allergies indicates:   Allergen Reactions    Aspirin      Other reaction(s): hx of ulcers    Tetracycline Swelling     Other reaction(s): Swelling    Penicillins Rash     Other reaction(s): Hives  Other reaction(s): Rash  Other  reaction(s): Rash  Other reaction(s): Hives     Past Medical History:   Diagnosis Date    Anemia     Bilateral renal cysts     Cataract     Chronic LBP 7/26/2012    Chronic pain     CKD (chronic kidney disease), stage V     COPD (chronic obstructive pulmonary disease)     Dehydration     Encounter for blood transfusion     HTN (hypertension)     Lumbar spondylosis     Melanoma     of the lip    Metabolic bone disease     Migraines, neuralgic     Osteoporosis     Primary osteoarthritis of both knees     s/p Rt TKA    Pulmonary embolism with infarction     Seizures     Subdeltoid bursitis, L>R. 9/27/2012    Ulcer     Vitamin D deficiency disease      Past Surgical History:   Procedure Laterality Date    BLADDER SUSPENSION      CATARACT EXTRACTION  11/18/13    left eye    CERVICAL LAMINECTOMY      x3, fusion x1    COLONOSCOPY  2009    HYSTERECTOMY      INSERTION, IOL PROSTHESIS Left 11/18/2013    [facility]    INSERTION, IOL PROSTHESIS Right 10/28/2013    [facility]    JOINT REPLACEMENT  2001    total right knee     LUMBAR LAMINECTOMY      x 3, fusion x1    OOPHORECTOMY      PHACOEMULSIFICATION, CATARACT Left 11/18/2013    [facility]    PHACOEMULSIFICATION, CATARACT Right 10/28/2013    [facility]     Family History   Problem Relation Age of Onset    Arthritis Mother     Stroke Mother     Hypertension Father     Cancer Father     Cataracts Father     Diabetes Maternal Aunt     Hypertension Maternal Grandfather     Heart disease Maternal Grandfather     Heart attack Maternal Grandfather     Cataracts Sister     Glaucoma Cousin      Social History     Tobacco Use    Smoking status: Former Smoker     Types: Cigarettes    Smokeless tobacco: Never Used   Substance Use Topics    Alcohol use: Yes     Alcohol/week: 0.6 oz     Types: 1 Glasses of wine per week      Comment: Rare    Drug use: No     Review of Systems   Constitutional: Negative for chills and fever.   HENT: Negative for ear pain and sore throat.    Eyes: Negative for redness.   Respiratory: Positive for shortness of breath.    Cardiovascular: Negative for chest pain.   Gastrointestinal: Negative for abdominal pain, diarrhea and vomiting.   Genitourinary: Negative for dysuria.   Musculoskeletal: Negative for back pain.   Skin: Negative for rash.   Neurological: Negative for headaches.       Physical Exam     Initial Vitals [07/29/19 1157]   BP Pulse Resp Temp SpO2   126/68 105 18 97.7 °F (36.5 °C) (!) 94 %      MAP       --         Physical Exam    Nursing note and vitals reviewed.  Constitutional: She appears well-developed and well-nourished. She is not diaphoretic. No distress.   HENT:   Head: Normocephalic and atraumatic.   Right Ear: External ear normal.   Left Ear: External ear normal.   Mouth/Throat: Oropharynx is clear and moist.   Eyes: Conjunctivae and EOM are normal. Pupils are equal, round, and reactive to light.   Neck: Normal range of motion. Neck supple.   Cardiovascular: Normal rate, regular rhythm and normal heart sounds. Exam reveals no gallop and no friction rub.    No murmur heard.  Pulmonary/Chest: Effort normal. No tachypnea. She has decreased breath sounds in the left lower field. She has no wheezes. She has no rhonchi. She has no rales.   Oxygen via nasal cannula in place.   Diminished breath sounds to left lung base.   Abdominal: Soft. Bowel sounds are normal. There is no tenderness. There is no rebound and no guarding.   Musculoskeletal: Normal range of motion. She exhibits no edema or tenderness.   Lymphadenopathy:     She has no cervical adenopathy.   Neurological: She is alert and oriented to person, place, and time. She has normal strength.   Skin: Skin is warm and dry. Capillary refill takes less than 2 seconds. No rash noted.         ED Course   Procedures  Labs Reviewed   CBC  WITHOUT DIFFERENTIAL - Abnormal; Notable for the following components:       Result Value    Hemoglobin 11.1 (*)     Hematocrit 36.4 (*)     Mean Corpuscular Hemoglobin Conc 30.5 (*)     Platelets 370 (*)     All other components within normal limits   COMPREHENSIVE METABOLIC PANEL - Abnormal; Notable for the following components:    Sodium 135 (*)     CO2 21 (*)     ALT 6 (*)     eGFR if  42 (*)     eGFR if non  37 (*)     All other components within normal limits   B-TYPE NATRIURETIC PEPTIDE - Abnormal; Notable for the following components:     (*)     All other components within normal limits   URINALYSIS, REFLEX TO URINE CULTURE - Abnormal; Notable for the following components:    Specific Gravity, UA <=1.005 (*)     Occult Blood UA Trace (*)     All other components within normal limits    Narrative:     Preferred Collection Type->Urine, Clean Catch   TROPONIN I     EKG Readings: (Independently Interpreted)   Initial Reading: No STEMI. Previous EKG: Compared with most recent EKG Rhythm: Normal Sinus Rhythm.   Normal sinus rhythm, rate 97, no ST changes, no ischemia, normal intervals.  Compared to prior EKG 07/2019, grossly stable without significant change.     ECG Results          EKG 12-lead (In process)  Result time 07/29/19 13:37:26    In process by Interface, Lab In [facility] (07/29/19 13:37:26)                 Narrative:    Test Reason : R06.02,    Vent. Rate : 097 BPM     Atrial Rate : 097 BPM     P-R Int : 174 ms          QRS Dur : 070 ms      QT Int : 350 ms       P-R-T Axes : 064 092 034 degrees     QTc Int : 444 ms    Normal sinus rhythm  Rightward axis  Low voltage QRS  Cannot rule out Anterior infarct (cited on or before 19-JUN-2019)  Abnormal ECG  When compared with ECG of 17-JUL-2019 08:45,  No significant change was found    Referred By: [email or web address]   SELF           Confirmed By:                             Imaging Results          X-Ray Chest AP Portable  (Final result)  Result time 07/29/19 12:41:43    Final result by Shree Shannon MD (07/29/19 12:41:43)                 Impression:      Interval development of small left pleural effusion.      Electronically signed by: Shree Shannon MD  Date:    07/29/2019  Time:    12:41             Narrative:    EXAMINATION:  XR CHEST AP PORTABLE    CLINICAL HISTORY:  shortness of breath;    TECHNIQUE:  Single frontal view of the chest was performed.    COMPARISON:  07/17/2019    FINDINGS:  Trachea is patent.  Cardiac silhouette appears normal in size.  There is a new small left pleural effusion.  Right lung appears well expanded and clear.  No free air below the diaphragm in or pneumothorax.  Partially visualized postsurgical changes of the cervical spine.  No acute osseous abnormality.                                 Medical Decision Making:   Initial Assessment:   73 yo F with COPD on home O2 presents to ED due to worsening SOB.  Recent admission for L pleural effusion requiring thoracentesis; family mainly concerned about recurrence of effusion.  Will obtain CXR, labs, and continue to monitor.  Differential Diagnosis:   Differential Diagnosis includes, but is not limited to:  PE, MI/ACS, pneumothorax, pericardial effusion/tamonade, pneumonia, lung abscess, pericarditis/myocarditis, pleural effusion, lung mass, CHF exacerbation, asthma exacerbation, COPD exacerbation, aspirated/ingested foreign body, airway obstruction, CO poisoning, anemia, metabolic derangement, allergy/atopy, influenza, viral URI, viral syndrome.    Clinical Tests:   Lab Tests: Ordered and Reviewed  Radiological Study: Ordered and Reviewed  Medical Tests: Ordered and Reviewed  ED Management:  CXR does reveal recurrence of pleural effusion, though much smaller than prior.  Labs unremarkable.  Patient given DuoNeb in ED and feels much better on reassessment.  Discussed CXR and lab findings with patient and family, and discussed options including admission  for monitoring and thoracentesis if needed vs. return home and f/u with Pulmonology as scheduled tomorrow.  Patient and family requesting discharge home at this time.  Patient was given strict return precautions including worsening SOB, CP, fever, or any other concerns.  Patient and family stated understanding and comfortable with plan at this time.  Upon re-evaluation, the patient's status has improved.  After complete ED evaluation, clinical impression is most consistent with COPD, L pleural effusion.  Pulmonology follow-up within 1-2 days was recommended.    After taking into careful account the patient's history, physical exam findings, as well as empirical and objective data obtained throughout ED workup, I feel no emergent medical condition has been identified. No further evaluation or admission was felt to be required, and the patient is stable for discharge from the ED. The patient and any additional family present were updated with test results, overall clinical impression, and recommended further plan of care, including discharge instructions as provided and outpatient follow-up for continued evaluation and management as needed. All questions were answered. The patient expressed understanding and agreed with current plan for discharge and follow-up plan of care. Strict ED return precautions were provided, including return/worsening of current symptoms, new symptoms, or any other concerns.                        Clinical Impression:       ICD-10-CM ICD-9-CM   1. Pleural effusion, left J90 511.9   2. SOB (shortness of breath) R06.02 786.05   3. COPD exacerbation J44.1 491.21     Disposition:   Disposition: Discharged  Condition: Stable        I, Dr. Roland Green, personally performed the services described in this documentation. All medical record entries made by the scribe were at my direction and in my presence.  I have reviewed the chart and agree that the record reflects my personal performance and is  accurate and complete.     Roland Green MD.     Roland Green MD  07/31/19 1009

## 2019-07-29 NOTE — ED NOTES
Pt states she feels much better after breathing treatment.  States she is ready to get out of here

## 2019-07-31 DIAGNOSIS — R91.8 LUNG MASS: Primary | ICD-10-CM

## 2019-08-15 ENCOUNTER — OFFICE VISIT (OUTPATIENT)
Dept: PRIMARY CARE CLINIC | Facility: CLINIC | Age: 74
End: 2019-08-15
Payer: MEDICARE

## 2019-08-15 ENCOUNTER — LAB VISIT (OUTPATIENT)
Dept: LAB | Facility: HOSPITAL | Age: 74
End: 2019-08-15
Attending: INTERNAL MEDICINE
Payer: MEDICARE

## 2019-08-15 VITALS
WEIGHT: 132.25 LBS | TEMPERATURE: 98 F | DIASTOLIC BLOOD PRESSURE: 66 MMHG | HEART RATE: 102 BPM | SYSTOLIC BLOOD PRESSURE: 105 MMHG | OXYGEN SATURATION: 94 % | BODY MASS INDEX: 24.19 KG/M2

## 2019-08-15 DIAGNOSIS — I10 ESSENTIAL HYPERTENSION: Chronic | ICD-10-CM

## 2019-08-15 DIAGNOSIS — J96.11 CHRONIC RESPIRATORY FAILURE WITH HYPOXIA: ICD-10-CM

## 2019-08-15 DIAGNOSIS — J90 RECURRENT PLEURAL EFFUSION ON LEFT: Primary | ICD-10-CM

## 2019-08-15 DIAGNOSIS — N18.4 CKD (CHRONIC KIDNEY DISEASE), STAGE IV: ICD-10-CM

## 2019-08-15 DIAGNOSIS — J44.89 COPD (CHRONIC OBSTRUCTIVE PULMONARY DISEASE) WITH CHRONIC BRONCHITIS: Chronic | ICD-10-CM

## 2019-08-15 DIAGNOSIS — R91.8 LUNG MASS: ICD-10-CM

## 2019-08-15 PROCEDURE — 86480 TB TEST CELL IMMUN MEASURE: CPT | Mod: HCNC

## 2019-08-15 PROCEDURE — 99999 PR PBB SHADOW E&M-EST. PATIENT-LVL III: ICD-10-PCS | Mod: PBBFAC,HCNC,, | Performed by: INTERNAL MEDICINE

## 2019-08-15 PROCEDURE — 99999 PR PBB SHADOW E&M-EST. PATIENT-LVL III: CPT | Mod: PBBFAC,HCNC,, | Performed by: INTERNAL MEDICINE

## 2019-08-15 PROCEDURE — 99214 OFFICE O/P EST MOD 30 MIN: CPT | Mod: HCNC,S$GLB,, | Performed by: INTERNAL MEDICINE

## 2019-08-15 PROCEDURE — 3078F PR MOST RECENT DIASTOLIC BLOOD PRESSURE < 80 MM HG: ICD-10-PCS | Mod: HCNC,CPTII,S$GLB, | Performed by: INTERNAL MEDICINE

## 2019-08-15 PROCEDURE — 1101F PR PT FALLS ASSESS DOC 0-1 FALLS W/OUT INJ PAST YR: ICD-10-PCS | Mod: HCNC,CPTII,S$GLB, | Performed by: INTERNAL MEDICINE

## 2019-08-15 PROCEDURE — 1101F PT FALLS ASSESS-DOCD LE1/YR: CPT | Mod: HCNC,CPTII,S$GLB, | Performed by: INTERNAL MEDICINE

## 2019-08-15 PROCEDURE — 3078F DIAST BP <80 MM HG: CPT | Mod: HCNC,CPTII,S$GLB, | Performed by: INTERNAL MEDICINE

## 2019-08-15 PROCEDURE — 3074F PR MOST RECENT SYSTOLIC BLOOD PRESSURE < 130 MM HG: ICD-10-PCS | Mod: HCNC,CPTII,S$GLB, | Performed by: INTERNAL MEDICINE

## 2019-08-15 PROCEDURE — 99499 RISK ADDL DX/OHS AUDIT: ICD-10-PCS | Mod: HCNC,S$GLB,, | Performed by: INTERNAL MEDICINE

## 2019-08-15 PROCEDURE — 99499 UNLISTED E&M SERVICE: CPT | Mod: HCNC,S$GLB,, | Performed by: INTERNAL MEDICINE

## 2019-08-15 PROCEDURE — 36415 COLL VENOUS BLD VENIPUNCTURE: CPT | Mod: HCNC

## 2019-08-15 PROCEDURE — 3074F SYST BP LT 130 MM HG: CPT | Mod: HCNC,CPTII,S$GLB, | Performed by: INTERNAL MEDICINE

## 2019-08-15 PROCEDURE — 99214 PR OFFICE/OUTPT VISIT, EST, LEVL IV, 30-39 MIN: ICD-10-PCS | Mod: HCNC,S$GLB,, | Performed by: INTERNAL MEDICINE

## 2019-08-15 RX ORDER — DIAZEPAM 2 MG/1
2 TABLET ORAL DAILY
COMMUNITY
Start: 2019-07-02 | End: 2020-02-19 | Stop reason: ALTCHOICE

## 2019-08-15 RX ORDER — ERGOCALCIFEROL 1.25 MG/1
CAPSULE ORAL
Status: ON HOLD | COMMUNITY
Start: 2019-06-24 | End: 2020-01-15 | Stop reason: CLARIF

## 2019-08-15 RX ORDER — BUSPIRONE HYDROCHLORIDE 5 MG/1
5 TABLET ORAL 3 TIMES DAILY
COMMUNITY
Start: 2019-07-23 | End: 2020-02-18

## 2019-08-15 NOTE — PROGRESS NOTES
Subjective:       Patient ID: Katie Quevedo is a 74 y.o. female.    Chief Complaint: Hospital Follow Up    HPI:  Returns to [facility] for continued hospital follow up.  Recently hospitalized for dyspnea related to recurrent left pleural effusion.  Initial hospitalization 6/19/19 for left pleural effusion- underwent thoracentesis with removal of 1.2 L pleural fluid.  Cytology NEG for malignant cells.   Re-hospitalized 7/17/19 with recurrent left pleural effusion- 600 ml fluid removed by thoracentesis.  Cytology NEG for malignant cells.   No microbial growth on second specimen.  AFB Cx's still pending of both specimens.  Hospital team initially had plan for General Surgery pleural biopsy, but decided to defer this until pleural cytology specimen returned.   Patient discharged home with plans for outpatient Pulmonary follow up.    Completed pulmonary evaluation with Dr Interiano on 7/30/19.  Planning for VATS with pleural biopsy.  Needs pre-procedure testing (PFT and IGRA).   These tests have not yet been scheduled.  Patient tells [facility] is out of network for her insurance, and plans are in place for her to have procedure at [facility].    She is accompanied today by her  who is in the waiting room.  Patient mobile with electric scooter.  Reports compliance with all medication.    Reports 3 mo of nausea, worse after meals.   No vomiting. No abdominal pain. No diarrhea or constipation.  Had bleeding gastric ulcer over 20 years ago- no GI problems since that time and has not undergone repeat EGD since that time.      Review of Systems   Constitutional: Negative for chills, fever and weight loss.   HENT: Negative for hearing loss.    Eyes: Negative for blurred vision.   Respiratory: Positive for cough and shortness of breath. Negative for hemoptysis, sputum production and wheezing.    Cardiovascular: Negative for chest pain, orthopnea, leg swelling and PND.   Gastrointestinal: Positive for nausea. Negative  for abdominal pain, blood in stool, constipation, diarrhea, heartburn and vomiting.   Genitourinary: Negative for dysuria.   Musculoskeletal: Negative for falls.   Skin: Negative for rash.   Neurological: Negative for dizziness.   Endo/Heme/Allergies: Does not bruise/bleed easily.   Psychiatric/Behavioral: Negative for depression and memory loss. The patient is not nervous/anxious.            Objective:      Vital Signs:  Vitals:    08/15/19 1504   BP: 105/66   Pulse: 102   Temp: 98 °F (36.7 °C)     Wt Readings from Last 1 Encounters:   08/15/19 1504 60 kg (132 lb 4.4 oz)     Body mass index is 24.19 kg/m².   SpO2 Readings from Last 1 Encounters:   08/15/19 (!) 94%       Physical Exam   Constitutional: She is oriented to person, place, and time. She appears well-developed and well-nourished. No distress.   HENT:   Head: Normocephalic.   Eyes: Pupils are equal, round, and reactive to light.   Neck: Normal range of motion. No JVD present.   Cardiovascular: Normal rate and regular rhythm.   No murmur heard.  Pulmonary/Chest: No respiratory distress. She has wheezes (mild, scattered ).   + poor inspiratory effort, minimal air movement      Abdominal: Soft. Bowel sounds are normal. She exhibits no distension.   Musculoskeletal: She exhibits no edema.   Neurological: She is alert and oriented to person, place, and time.   Skin: Skin is warm and dry.   Psychiatric: She has a normal mood and affect.           Assessment:       1. Recurrent pleural effusion on left    2. COPD (chronic obstructive pulmonary disease) with chronic bronchitis    3. Chronic respiratory failure with hypoxia    4. Essential hypertension    5. CKD (chronic kidney disease), stage IV        Plan:       Diagnoses and all orders for this visit:    Recurrent pleural effusion on left  - managed by [facility] pulmonary team   - awaiting VATS and pleural biopsy  - will assist with scheduling outstanding labs/studies today     COPD (chronic obstructive pulmonary  disease) with chronic bronchitis  Chronic respiratory failure with hypoxia  - on chronic supplemental oxygen    Essential hypertension  - at goal     CKD (chronic kidney disease), stage IV  - will see Dr Aura Diggs 10/28/19        I will see patient again in [facility] 9/5/19.  I will communicate with Pulmonary team regarding coordination of care issues .    Scheduled Follow-up :  Future Appointments   Date Time Provider Department Center   8/29/2019  2:15 PM LULA, PULMONARY FUNCTION LAB [facility] PULMFCT [facility]   9/5/2019  3:00 PM Clemencia Gambino MD [facility] IMPRI [facility]   10/28/2019 10:30 AM Aura Diggs MD [facility]   11/4/2019  4:00 PM Pushpa Mcmahon MD [facility]       Post Visit Medication List:     Medication List           Accurate as of 8/15/19  3:16 PM. If you have any questions, ask your nurse or doctor.               CONTINUE taking these medications    amLODIPine 10 MG tablet  Commonly known as:  NORVASC     busPIRone 5 MG Tab  Commonly known as:  BUSPAR     CALCIUM 500 + D 500 mg(1,250mg) -200 unit per tablet  Generic drug:  calcium-vitamin D3     diazePAM 2 MG tablet  Commonly known as:  VALIUM     DULoxetine 30 MG capsule  Commonly known as:  CYMBALTA  Take 1 capsule (30 mg total) by mouth once daily.     ergocalciferol 50,000 unit Cap  Commonly known as:  ERGOCALCIFEROL     ferrous sulfate 325 (65 FE) MG EC tablet  Take 1 tablet (325 mg total) by mouth 2 (two) times daily.     fluticasone-salmeterol 500-50 mcg/dose 500-50 mcg/dose Dsdv diskus inhaler  Commonly known as:  ADVAIR DISKUS  Inhale 1 puff into the lungs 2 (two) times daily. Controller     HYDROcodone-acetaminophen  mg per tablet  Commonly known as:  NORCO  Take 1 tablet by mouth 3 (three) times daily as needed.     * ipratropium-albuterol  mcg/actuation inhaler  Commonly known as:  COMBIVENT  Inhale 1 puff into the lungs every 4 (four) hours as needed for Wheezing. Rescue     *  albuterol-ipratropium 2.5 mg-0.5 mg/3 mL nebulizer solution  Commonly known as:  DUO-NEB  Take 3 mLs by nebulization 3 (three) times daily as needed for Wheezing or Shortness of Breath. Rescue     sodium bicarbonate 650 MG tablet  Take 1 tablet (650 mg total) by mouth 2 (two) times daily.     traZODone 50 MG tablet  Commonly known as:  DESYREL  Take 1 tablet (50 mg total) by mouth every evening.     zolpidem 5 MG Tab  Commonly known as:  AMBIEN         * This list has 2 medication(s) that are the same as other medications prescribed for you. Read the directions carefully, and ask your doctor or other care provider to review them with you.

## 2019-08-19 LAB
M TB IFN-G CD4+ BCKGRND COR BLD-ACNC: 0.01 IU/ML
MITOGEN IGNF BCKGRD COR BLD-ACNC: >10 IU/ML
MITOGEN IGNF BCKGRD COR BLD-ACNC: NEGATIVE [IU]/ML
NIL: 0.04 IU/ML
TB2 - NIL: 0.01 IU/ML

## 2019-08-25 PROBLEM — R06.02 SHORTNESS OF BREATH: Status: RESOLVED | Noted: 2019-07-17 | Resolved: 2019-08-25

## 2019-08-25 PROBLEM — I26.99 PULMONARY INFARCT: Status: RESOLVED | Noted: 2018-03-16 | Resolved: 2019-08-25

## 2019-08-26 DIAGNOSIS — M54.2 CHRONIC NECK PAIN: ICD-10-CM

## 2019-08-26 DIAGNOSIS — M54.41 CHRONIC MIDLINE LOW BACK PAIN WITH RIGHT-SIDED SCIATICA: ICD-10-CM

## 2019-08-26 DIAGNOSIS — G89.29 CHRONIC NECK PAIN: ICD-10-CM

## 2019-08-26 DIAGNOSIS — G89.29 CHRONIC MIDLINE LOW BACK PAIN WITH RIGHT-SIDED SCIATICA: ICD-10-CM

## 2019-08-26 DIAGNOSIS — M16.0 PRIMARY OSTEOARTHRITIS OF BOTH HIPS: ICD-10-CM

## 2019-08-26 RX ORDER — HYDROCODONE BITARTRATE AND ACETAMINOPHEN 10; 325 MG/1; MG/1
1 TABLET ORAL 3 TIMES DAILY PRN
Qty: 90 TABLET | Refills: 0 | Status: SHIPPED | OUTPATIENT
Start: 2019-08-26 | End: 2019-09-23 | Stop reason: SDUPTHER

## 2019-08-26 NOTE — TELEPHONE ENCOUNTER
Last Rx refill-----07/23/19  Last office visit--06/25/19  Next office visit--11/04/19        ----- Message from Lissette Vega sent at 8/26/2019  4:02 PM CDT -----  Contact: PT  PT is calling to request refill on medication that is not listed in epic.    Hydrocodone. 10mg  1 tablet 3 times a day (these are the directions pt stated - don't have anything to go off of)  90 Tablet supply (again, pt's words)    [facility]/pharmacy #[id number] - [place] - [address]  [address]  [place] [address]  Phone: [phone] Fax: [phone]      Callback: [phone]

## 2019-08-30 ENCOUNTER — HOSPITAL ENCOUNTER (OUTPATIENT)
Dept: PULMONOLOGY | Facility: HOSPITAL | Age: 74
Discharge: HOME | End: 2019-08-30
Attending: INTERNAL MEDICINE
Payer: MEDICARE

## 2019-08-30 DIAGNOSIS — R91.8 LUNG MASS: ICD-10-CM

## 2019-08-30 PROCEDURE — 94726 PLETHYSMOGRAPHY LUNG VOLUMES: CPT | Mod: HCNC

## 2019-08-30 PROCEDURE — 94010 BREATHING CAPACITY TEST: CPT | Mod: HCNC

## 2019-08-30 PROCEDURE — 94729 DIFFUSING CAPACITY: CPT | Mod: HCNC

## 2019-08-30 PROCEDURE — 94640 AIRWAY INHALATION TREATMENT: CPT | Mod: HCNC

## 2019-08-31 LAB
BRPFT: ABNORMAL
DLCO ADJ PRE: 10.58 ML/(MIN*MMHG) (ref 13.31–24.78)
DLCO SINGLE BREATH LLN: 13.31
DLCO SINGLE BREATH PRE REF: 55.5 %
DLCO SINGLE BREATH REF: 19.05
DLCOC SBVA LLN: 2.62
DLCOC SBVA PRE REF: 76.4 %
DLCOC SBVA REF: 4.17
DLCOC SINGLE BREATH LLN: 13.31
DLCOC SINGLE BREATH PRE REF: 55.5 %
DLCOC SINGLE BREATH REF: 19.05
DLCOVA LLN: 2.62
DLCOVA PRE REF: 76.4 %
DLCOVA PRE: 3.18 ML/(MIN*MMHG*L) (ref 2.62–5.71)
DLCOVA REF: 4.17
DLVAADJ PRE: 3.18 ML/(MIN*MMHG*L) (ref 2.62–5.71)
ERV LLN: 0.54
ERV PRE REF: 33.6 %
ERV REF: 0.54
FEF 25 75 CHG: 33 %
FEF 25 75 LLN: 0.69
FEF 25 75 POST REF: 25.2 %
FEF 25 75 PRE REF: 18.9 %
FEF 25 75 REF: 1.58
FET100 CHG: -3.3 %
FEV1 CHG: 18.1 %
FEV1 FVC CHG: 8.5 %
FEV1 FVC LLN: 64
FEV1 FVC POST REF: 72 %
FEV1 FVC PRE REF: 66.4 %
FEV1 FVC REF: 78
FEV1 LLN: 1.34
FEV1 POST REF: 51.9 %
FEV1 PRE REF: 44 %
FEV1 REF: 1.89
FRCPLETH LLN: 1.77
FRCPLETH PREREF: 116.9 %
FRCPLETH REF: 2.59
FVC CHG: 8.8 %
FVC LLN: 1.75
FVC POST REF: 71.4 %
FVC PRE REF: 65.6 %
FVC REF: 2.45
IVC PRE: 1.63 L (ref 1.75–3.16)
IVC SINGLE BREATH LLN: 1.75
IVC SINGLE BREATH PRE REF: 66.6 %
IVC SINGLE BREATH REF: 2.45
PEF CHG: -24.9 %
PEF LLN: 3.23
PEF POST REF: 54.7 %
PEF PRE REF: 72.9 %
PEF REF: 4.83
POST FEF 25 75: 0.4 L/S (ref 0.69–2.48)
POST FET 100: 7.72 SEC
POST FEV1 FVC: 55.94 % (ref 63.52–91.87)
POST FEV1: 0.98 L (ref 1.34–2.43)
POST FVC: 1.75 L (ref 1.75–3.16)
POST PEF: 2.64 L/S (ref 3.23–6.42)
PRE DLCO: 10.58 ML/(MIN*MMHG) (ref 13.31–24.78)
PRE ERV: 0.18 L (ref 0.54–0.54)
PRE FEF 25 75: 0.3 L/S (ref 0.69–2.48)
PRE FET 100: 7.98 SEC
PRE FEV1 FVC: 51.55 % (ref 63.52–91.87)
PRE FEV1: 0.83 L (ref 1.34–2.43)
PRE FRC PL: 3.03 L
PRE FVC: 1.61 L (ref 1.75–3.16)
PRE PEF: 3.52 L/S (ref 3.23–6.42)
PRE RV: 2.54 L (ref 1.48–2.63)
PRE TLC: 4.46 L (ref 3.58–5.56)
RAW LLN: 3.06
RAW PRE REF: 176.3 %
RAW PRE: 5.39 CMH2O*S/L (ref 3.06–3.06)
RAW REF: 3.06
RV LLN: 1.48
RV PRE REF: 123.4 %
RV REF: 2.06
RVTLC LLN: 35
RVTLC PRE REF: 127.1 %
RVTLC PRE: 56.95 % (ref 35.21–54.39)
RVTLC REF: 45
TLC LLN: 3.59
TLC PRE REF: 97.6 %
TLC REF: 4.57
VA PRE: 3.32 L (ref 4.42–4.42)
VA SINGLE BREATH LLN: 4.42
VA SINGLE BREATH PRE REF: 75.2 %
VA SINGLE BREATH REF: 4.42
VC LLN: 1.75
VC PRE REF: 64.8 %
VC PRE: 1.59 L (ref 1.75–3.16)
VC REF: 2.45
VTGRAWPRE: 3.35 L

## 2019-09-05 ENCOUNTER — TELEPHONE (OUTPATIENT)
Dept: PRIMARY CARE CLINIC | Facility: CLINIC | Age: 74
End: 2019-09-05

## 2019-09-05 NOTE — TELEPHONE ENCOUNTER
Left message on both numbers to reschedule appointment because provider will be out of the office.

## 2019-09-07 ENCOUNTER — NURSE TRIAGE (OUTPATIENT)
Dept: ADMINISTRATIVE | Facility: CLINIC | Age: 74
End: 2019-09-07

## 2019-09-07 RX ORDER — SULFAMETHOXAZOLE AND TRIMETHOPRIM 800; 160 MG/1; MG/1
1 TABLET ORAL 2 TIMES DAILY
Qty: 10 TABLET | Refills: 0 | Status: ON HOLD | OUTPATIENT
Start: 2019-09-07 | End: 2019-09-16 | Stop reason: HOSPADM

## 2019-09-07 NOTE — TELEPHONE ENCOUNTER
"  Reason for Disposition   Side (flank) or lower back pain present    Additional Information   Negative: Shock suspected (e.g., cold/pale/clammy skin, too weak to stand, low BP, rapid pulse)   Negative: Sounds like a life-threatening emergency to the triager   Negative: Followed a genital area injury   Negative: Followed a genital area injury (penis, scrotum)   Negative: Vaginal discharge   Negative: Pus (white, yellow) or bloody discharge from end of penis   Negative: [1] Taking antibiotic for urinary tract infection (UTI) AND [2] female   Negative: [1] Taking antibiotic for urinary tract infection (UTI) AND [2] male   Negative: [1] Discomfort (pain, burning or stinging) when passing urine AND [2] pregnant   Negative: [1] Discomfort (pain, burning or stinging) when passing urine AND [2] postpartum < 1 month   Negative: [1] Discomfort (pain, burning or stinging) when passing urine AND [2] female   Negative: [1] Discomfort (pain, burning or stinging) when passing urine AND [2] male   Negative: Pain or itching in the vulvar area   Negative: Pain in scrotum is main symptom   Negative: Blood in the urine is main symptom   Negative: Symptoms arising from use of a urinary catheter ([place] or [place])   Negative: [1] Unable to urinate (or only a few drops) > 4 hours AND     [2] bladder feels very full (e.g., palpable bladder or strong urge to urinate)   Negative: [1] Decreased urination and [2] drinking very little AND [2] dehydration suspected (e.g., dark urine, no urine > 12 hours, very dry mouth, very lightheaded)   Negative: Patient sounds very sick or weak to the triager   Negative: Fever > 100.5 F (38.1 C)    Answer Assessment - Initial Assessment Questions  1. SYMPTOM: "What's the main symptom you're concerned about?" (e.g., frequency, incontinence)     Burning when urinating since yest. Afeb. No blood in urine. Not able to completely empty the bladder. No nausea. No pain over kidney   2. ONSET: " ""When did the  ________  start?"     9/6  3. PAIN: "Is there any pain?" If so, ask: "How bad is it?" (Scale: 1-10; mild, moderate, severe)    7  4. CAUSE: "What do you think is causing the symptoms?"     Similar sx to previous bladder infection   5. OTHER SYMPTOMS: "Do you have any other symptoms?" (e.g., fever, flank pain, blood in urine, pain with urination)    Pain with urinating. Drinking plenty of fluids    Protocols used: ST URINARY SYMPTOMS-A-AH  Mouth dry. rec UC. Pt states she is not feeling up to it.   [facility]  [place] .   Spoke with dr coronado Bactrim DS 1 po BID x 5 days #10. If no feeling better in 48 hours call PCP office. Pt notified. Call back with questions    LM on pharm VM at 532pm   "

## 2019-09-10 ENCOUNTER — TELEPHONE (OUTPATIENT)
Dept: PRIMARY CARE CLINIC | Facility: CLINIC | Age: 74
End: 2019-09-10

## 2019-09-12 RX ORDER — IPRATROPIUM BROMIDE AND ALBUTEROL SULFATE 2.5; .5 MG/3ML; MG/3ML
SOLUTION RESPIRATORY (INHALATION)
Qty: 90 ML | Refills: 11 | Status: ON HOLD | OUTPATIENT
Start: 2019-09-12 | End: 2019-09-16 | Stop reason: HOSPADM

## 2019-09-13 ENCOUNTER — HOSPITAL ENCOUNTER (OUTPATIENT)
Facility: HOSPITAL | Age: 74
Discharge: HOME | End: 2019-09-16
Attending: EMERGENCY MEDICINE | Admitting: FAMILY MEDICINE
Payer: MEDICARE

## 2019-09-13 DIAGNOSIS — N17.9 ACUTE KIDNEY INJURY: ICD-10-CM

## 2019-09-13 DIAGNOSIS — E87.70 FLUID OVERLOAD: ICD-10-CM

## 2019-09-13 DIAGNOSIS — R06.02 SHORTNESS OF BREATH: ICD-10-CM

## 2019-09-13 DIAGNOSIS — R06.09 DYSPNEA ON EXERTION: ICD-10-CM

## 2019-09-13 DIAGNOSIS — J90 PLEURAL EFFUSION: Primary | ICD-10-CM

## 2019-09-13 DIAGNOSIS — J90 RECURRENT LEFT PLEURAL EFFUSION: ICD-10-CM

## 2019-09-13 PROBLEM — Z99.81 CHRONIC RESPIRATORY FAILURE WITH HYPOXIA, ON HOME O2 THERAPY: Status: ACTIVE | Noted: 2019-07-18

## 2019-09-13 LAB
ALBUMIN SERPL BCP-MCNC: 4.3 G/DL (ref 3.5–5.2)
ALP SERPL-CCNC: 102 U/L (ref 55–135)
ALT SERPL W/O P-5'-P-CCNC: 8 U/L (ref 10–44)
ANION GAP SERPL CALC-SCNC: 14 MMOL/L (ref 8–16)
AST SERPL-CCNC: 23 U/L (ref 10–40)
BILIRUB SERPL-MCNC: 0.3 MG/DL (ref 0.1–1)
BNP SERPL-MCNC: 586 PG/ML (ref 0–99)
BUN SERPL-MCNC: 24 MG/DL (ref 8–23)
CALCIUM SERPL-MCNC: 10 MG/DL (ref 8.7–10.5)
CHLORIDE SERPL-SCNC: 101 MMOL/L (ref 95–110)
CO2 SERPL-SCNC: 21 MMOL/L (ref 23–29)
CREAT SERPL-MCNC: 2.3 MG/DL (ref 0.5–1.4)
ERYTHROCYTE [DISTWIDTH] IN BLOOD BY AUTOMATED COUNT: 16.8 % (ref 11.5–14.5)
EST. GFR  (AFRICAN AMERICAN): 23 ML/MIN/1.73 M^2
EST. GFR  (NON AFRICAN AMERICAN): 20 ML/MIN/1.73 M^2
GLUCOSE SERPL-MCNC: 90 MG/DL (ref 70–110)
HCT VFR BLD AUTO: 32.5 % (ref 37–48.5)
HGB BLD-MCNC: 10.3 G/DL (ref 12–16)
MCH RBC QN AUTO: 27.4 PG (ref 27–31)
MCHC RBC AUTO-ENTMCNC: 31.7 G/DL (ref 32–36)
MCV RBC AUTO: 86 FL (ref 82–98)
PLATELET # BLD AUTO: 362 K/UL (ref 150–350)
PMV BLD AUTO: 9.6 FL (ref 9.2–12.9)
POTASSIUM SERPL-SCNC: 5 MMOL/L (ref 3.5–5.1)
PROT SERPL-MCNC: 8 G/DL (ref 6–8.4)
RBC # BLD AUTO: 3.76 M/UL (ref 4–5.4)
SODIUM SERPL-SCNC: 136 MMOL/L (ref 136–145)
TROPONIN I SERPL DL<=0.01 NG/ML-MCNC: 0.01 NG/ML (ref 0–0.03)
WBC # BLD AUTO: 7.48 K/UL (ref 3.9–12.7)

## 2019-09-13 PROCEDURE — 93010 ELECTROCARDIOGRAM REPORT: CPT | Mod: HCNC,,, | Performed by: INTERNAL MEDICINE

## 2019-09-13 PROCEDURE — G0378 HOSPITAL OBSERVATION PER HR: HCPCS | Mod: HCNC

## 2019-09-13 PROCEDURE — 96375 TX/PRO/DX INJ NEW DRUG ADDON: CPT | Mod: HCNC

## 2019-09-13 PROCEDURE — 25000003 PHARM REV CODE 250: Mod: HCNC | Performed by: EMERGENCY MEDICINE

## 2019-09-13 PROCEDURE — 94761 N-INVAS EAR/PLS OXIMETRY MLT: CPT | Mod: HCNC

## 2019-09-13 PROCEDURE — 93005 ELECTROCARDIOGRAM TRACING: CPT | Mod: HCNC

## 2019-09-13 PROCEDURE — 85027 COMPLETE CBC AUTOMATED: CPT | Mod: HCNC

## 2019-09-13 PROCEDURE — 94640 AIRWAY INHALATION TREATMENT: CPT | Mod: HCNC

## 2019-09-13 PROCEDURE — 27000221 HC OXYGEN, UP TO 24 HOURS: Mod: HCNC

## 2019-09-13 PROCEDURE — 83880 ASSAY OF NATRIURETIC PEPTIDE: CPT | Mod: HCNC

## 2019-09-13 PROCEDURE — 93010 EKG 12-LEAD: ICD-10-PCS | Mod: HCNC,,, | Performed by: INTERNAL MEDICINE

## 2019-09-13 PROCEDURE — 25000003 PHARM REV CODE 250: Mod: HCNC | Performed by: NURSE PRACTITIONER

## 2019-09-13 PROCEDURE — 25000242 PHARM REV CODE 250 ALT 637 W/ HCPCS: Mod: HCNC | Performed by: EMERGENCY MEDICINE

## 2019-09-13 PROCEDURE — 96365 THER/PROPH/DIAG IV INF INIT: CPT | Mod: HCNC

## 2019-09-13 PROCEDURE — 99285 EMERGENCY DEPT VISIT HI MDM: CPT | Mod: 25,HCNC

## 2019-09-13 PROCEDURE — 80053 COMPREHEN METABOLIC PANEL: CPT | Mod: HCNC

## 2019-09-13 PROCEDURE — 63600175 PHARM REV CODE 636 W HCPCS: Mod: HCNC | Performed by: EMERGENCY MEDICINE

## 2019-09-13 PROCEDURE — 84484 ASSAY OF TROPONIN QUANT: CPT | Mod: HCNC

## 2019-09-13 RX ORDER — ONDANSETRON 4 MG/1
4 TABLET, ORALLY DISINTEGRATING ORAL EVERY 6 HOURS PRN
Status: DISCONTINUED | OUTPATIENT
Start: 2019-09-13 | End: 2019-09-16 | Stop reason: HOSPADM

## 2019-09-13 RX ORDER — ONDANSETRON 2 MG/ML
4 INJECTION INTRAMUSCULAR; INTRAVENOUS
Status: COMPLETED | OUTPATIENT
Start: 2019-09-13 | End: 2019-09-13

## 2019-09-13 RX ORDER — TRAZODONE HYDROCHLORIDE 50 MG/1
50 TABLET ORAL NIGHTLY
Status: DISCONTINUED | OUTPATIENT
Start: 2019-09-13 | End: 2019-09-16 | Stop reason: HOSPADM

## 2019-09-13 RX ORDER — ONDANSETRON 2 MG/ML
4 INJECTION INTRAMUSCULAR; INTRAVENOUS EVERY 6 HOURS PRN
Status: DISCONTINUED | OUTPATIENT
Start: 2019-09-13 | End: 2019-09-16 | Stop reason: HOSPADM

## 2019-09-13 RX ORDER — IPRATROPIUM BROMIDE AND ALBUTEROL SULFATE 2.5; .5 MG/3ML; MG/3ML
3 SOLUTION RESPIRATORY (INHALATION) EVERY 4 HOURS PRN
Status: DISCONTINUED | OUTPATIENT
Start: 2019-09-13 | End: 2019-09-15

## 2019-09-13 RX ORDER — MORPHINE SULFATE 4 MG/ML
2 INJECTION, SOLUTION INTRAMUSCULAR; INTRAVENOUS ONCE
Status: COMPLETED | OUTPATIENT
Start: 2019-09-14 | End: 2019-09-14

## 2019-09-13 RX ORDER — HYDROCODONE BITARTRATE AND ACETAMINOPHEN 10; 325 MG/1; MG/1
1 TABLET ORAL 3 TIMES DAILY PRN
Status: DISCONTINUED | OUTPATIENT
Start: 2019-09-13 | End: 2019-09-14

## 2019-09-13 RX ORDER — HYDROCODONE BITARTRATE AND ACETAMINOPHEN 5; 325 MG/1; MG/1
1 TABLET ORAL
Status: COMPLETED | OUTPATIENT
Start: 2019-09-13 | End: 2019-09-13

## 2019-09-13 RX ORDER — IPRATROPIUM BROMIDE AND ALBUTEROL SULFATE 2.5; .5 MG/3ML; MG/3ML
3 SOLUTION RESPIRATORY (INHALATION)
Status: COMPLETED | OUTPATIENT
Start: 2019-09-13 | End: 2019-09-13

## 2019-09-13 RX ORDER — SODIUM CHLORIDE 0.9 % (FLUSH) 0.9 %
10 SYRINGE (ML) INJECTION
Status: DISCONTINUED | OUTPATIENT
Start: 2019-09-13 | End: 2019-09-16 | Stop reason: HOSPADM

## 2019-09-13 RX ADMIN — IPRATROPIUM BROMIDE AND ALBUTEROL SULFATE 3 ML: .5; 3 SOLUTION RESPIRATORY (INHALATION) at 11:09

## 2019-09-13 RX ADMIN — HYDROCODONE BITARTRATE AND ACETAMINOPHEN 1 TABLET: 10; 325 TABLET ORAL at 07:09

## 2019-09-13 RX ADMIN — HYDROCODONE BITARTRATE AND ACETAMINOPHEN 1 TABLET: 5; 325 TABLET ORAL at 02:09

## 2019-09-13 RX ADMIN — ONDANSETRON 4 MG: 2 INJECTION INTRAMUSCULAR; INTRAVENOUS at 12:09

## 2019-09-13 RX ADMIN — PROMETHAZINE HYDROCHLORIDE 12.5 MG: 25 INJECTION INTRAMUSCULAR; INTRAVENOUS at 02:09

## 2019-09-13 NOTE — SUBJECTIVE & OBJECTIVE
Past Medical History:   Diagnosis Date    Anemia     Bilateral renal cysts     Cataract     Chronic LBP 7/26/2012    Chronic pain     CKD (chronic kidney disease), stage V     COPD (chronic obstructive pulmonary disease)     Dehydration     Encounter for blood transfusion     HTN (hypertension)     Lumbar spondylosis     Melanoma     of the lip    Metabolic bone disease     Migraines, neuralgic     Osteoporosis     Primary osteoarthritis of both knees     s/p Rt TKA    Pulmonary embolism with infarction     Seizures     Subdeltoid bursitis, L>R. 9/27/2012    Ulcer     Vitamin D deficiency disease        Past Surgical History:   Procedure Laterality Date    BLADDER SUSPENSION      CATARACT EXTRACTION  11/18/13    left eye    CERVICAL LAMINECTOMY      x3, fusion x1    COLONOSCOPY  2009    HYSTERECTOMY      INSERTION, IOL PROSTHESIS Left 11/18/2013    [facility]    INSERTION, IOL PROSTHESIS Right 10/28/2013    [facility]    JOINT REPLACEMENT  2001    total right knee     LUMBAR LAMINECTOMY      x 3, fusion x1    OOPHORECTOMY      PHACOEMULSIFICATION, CATARACT Left 11/18/2013    [facility]    PHACOEMULSIFICATION, CATARACT Right 10/28/2013    [facility]       Review of patient's allergies indicates:   Allergen Reactions    Aspirin      Other reaction(s): hx of ulcers    Tetracycline Swelling     Other reaction(s): Swelling    Penicillins Rash     Other reaction(s): Hives  Other reaction(s): Rash  Other reaction(s): Rash  Other reaction(s): Hives       No current facility-administered medications on file prior to encounter.      Current Outpatient Medications on File Prior to Encounter   Medication Sig    albuterol-ipratropium (DUO-NEB) 2.5 mg-0.5 mg/3 mL nebulizer solution NEBULIZE 1 VIAL 3 TIMES A DAY AS NEEDED FOR WHEEIZNG, SHORTNESS OF BREATH    amLODIPine  (NORVASC) 10 MG tablet Take 10 mg by mouth once daily.    busPIRone (BUSPAR) 5 MG Tab Take 5 mg by mouth.    calcium-vitamin D3 (CALCIUM 500 + D) 500 mg(1,250mg) -200 unit per tablet Take 1 tablet by mouth 2 (two) times daily with meals.    diazePAM (VALIUM) 2 MG tablet Take 2 mg by mouth.    ergocalciferol (ERGOCALCIFEROL) 50,000 unit Cap TAKE 1 CAPSULE BY MOUTH EVERY 7 DAYS, FOR 12 WEEKS, THEN MONTHLY    HYDROcodone-acetaminophen (NORCO)  mg per tablet Take 1 tablet by mouth 3 (three) times daily as needed.    ipratropium-albuterol (COMBIVENT)  mcg/actuation inhaler Inhale 1 puff into the lungs every 4 (four) hours as needed for Wheezing. Rescue    sodium bicarbonate 650 MG tablet Take 1 tablet (650 mg total) by mouth 2 (two) times daily.    traZODone (DESYREL) 50 MG tablet Take 1 tablet (50 mg total) by mouth every evening.    zolpidem (AMBIEN) 5 MG Tab Take 5 mg by mouth once daily.    DULoxetine (CYMBALTA) 30 MG capsule Take 1 capsule (30 mg total) by mouth once daily.    fluticasone-salmeterol 500-50 mcg/dose (ADVAIR DISKUS) 500-50 mcg/dose DsDv diskus inhaler Inhale 1 puff into the lungs 2 (two) times daily. Controller    [] sulfamethoxazole-trimethoprim 800-160mg (BACTRIM DS) 800-160 mg Tab Take 1 tablet by mouth 2 (two) times daily. for 5 days     Family History     Problem Relation (Age of Onset)    Arthritis Mother    Cancer Father    Cataracts Father, Sister    Diabetes Maternal Aunt    Glaucoma Cousin    Heart attack Maternal Grandfather    Heart disease Maternal Grandfather    Hypertension Father, Maternal Grandfather    Stroke Mother        Tobacco Use    Smoking status: Former Smoker     Types: Cigarettes    Smokeless tobacco: Never Used   Substance and Sexual Activity    Alcohol use: Yes     Alcohol/week: 0.6 oz     Types: 1 Glasses of wine per week     Comment: Rare    Drug use: No    Sexual activity: Never     Review of Systems   Constitutional: Positive for  activity change, appetite change and fatigue. Negative for chills, diaphoresis and fever.   HENT: Negative for trouble swallowing.    Eyes: Negative for visual disturbance.   Respiratory: Positive for shortness of breath. Negative for cough, wheezing and stridor.    Cardiovascular: Negative for chest pain, palpitations and leg swelling.   Gastrointestinal: Positive for nausea. Negative for abdominal distention, abdominal pain, blood in stool, constipation, diarrhea and vomiting.   Genitourinary: Negative for difficulty urinating and hematuria.   Musculoskeletal: Positive for back pain, gait problem and myalgias. Negative for arthralgias.   Skin: Positive for pallor. Negative for color change, rash and wound.   Neurological: Positive for weakness. Negative for dizziness, tremors, seizures, syncope, speech difficulty, light-headedness, numbness and headaches.   Hematological: Does not bruise/bleed easily.   Psychiatric/Behavioral: Negative for agitation, confusion, decreased concentration, dysphoric mood and hallucinations. The patient is not nervous/anxious and is not hyperactive.      Objective:     Vital Signs (Most Recent):  Temp: 97.6 °F (36.4 °C) (09/13/19 1106)  Pulse: 102 (09/13/19 1530)  Resp: (!) 21 (09/13/19 1530)  BP: 127/61 (09/13/19 1500)  SpO2: 95 % (09/13/19 1530) Vital Signs (24h Range):  Temp:  [97.6 °F (36.4 °C)] 97.6 °F (36.4 °C)  Pulse:  [102-112] 102  Resp:  [21-34] 21  SpO2:  [95 %-100 %] 95 %  BP: (117-141)/(61-77) 127/61     Weight: 59.9 kg (132 lb)  Body mass index is 24.14 kg/m².    Physical Exam   Constitutional: She is oriented to person, place, and time. She has a sickly appearance. She appears ill.   HENT:   Head: Normocephalic and atraumatic.   Eyes: Pupils are equal, round, and reactive to light.   Neck: Normal range of motion. Neck supple.   Cardiovascular: Normal rate, regular rhythm, normal heart sounds and intact distal pulses.   No murmur heard.  Pulmonary/Chest: Effort normal. No  respiratory distress. She has no wheezes.   Abdominal: Soft. Bowel sounds are normal. She exhibits no distension. There is no tenderness. There is no guarding.   Musculoskeletal: She exhibits no edema or tenderness.   Neurological: She is alert and oriented to person, place, and time.   Skin: Skin is warm and dry. Capillary refill takes less than 2 seconds. She is not diaphoretic.   Psychiatric: She has a normal mood and affect. Her behavior is normal. Judgment and thought content normal.   Nursing note and vitals reviewed.        CRANIAL NERVES     CN III, IV, VI   Pupils are equal, round, and reactive to light.       Significant Labs:   CBC:   Recent Labs   Lab 09/13/19  1145   WBC 7.48   HGB 10.3*   HCT 32.5*   *     CMP:   Recent Labs   Lab 09/13/19  1145      K 5.0      CO2 21*   GLU 90   BUN 24*   CREATININE 2.3*   CALCIUM 10.0   PROT 8.0   ALBUMIN 4.3   BILITOT 0.3   ALKPHOS 102   AST 23   ALT 8*   ANIONGAP 14   EGFRNONAA 20*     Cardiac Markers:   Recent Labs   Lab 09/13/19  1145   *     Troponin:   Recent Labs   Lab 09/13/19  1145   TROPONINI 0.007       Significant Imaging: I have reviewed all pertinent imaging results/findings within the past 24 hours.

## 2019-09-13 NOTE — ED PROVIDER NOTES
Encounter Date: 9/13/2019    SCRIBE #1 NOTE: I, Michelle Ahumada, am scribing for, and in the presence of,  Dr. Green. I have scribed the entire note.       History     Chief Complaint   Patient presents with    Shortness of Breath     Pt presents to ED today c/o SOB x 4 days pt reports she previously had fluid drained from lungs. pt presents on home o2 @ 2L via NC     Nausea     Katie Quevedo is a 74 y.o. female who  has a past medical history of Anemia, Bilateral renal cysts, Cataract, Chronic LBP (7/26/2012), Chronic pain, CKD (chronic kidney disease), stage V, COPD (chronic obstructive pulmonary disease), Dehydration, Encounter for blood transfusion, HTN (hypertension), Lumbar spondylosis, Melanoma, Metabolic bone disease, Migraines, neuralgic, Osteoporosis, Primary osteoarthritis of both knees, Pulmonary embolism with infarction, Seizures, Subdeltoid bursitis, L>R. (9/27/2012), Ulcer, and Vitamin D deficiency disease.    The patient presents to the ED due to worsening shortness of breath over the past 4 days.  Patient has history of recurrent L pleural effusion. She has been admitted for thoracentesis in the past.   She has followed up with her pulmonologist, and is scheduled to have a VATS procedure in the near future.  She has not required repeat thoracentesis since she was discharged from the hospital. Patient is on 2 L of supplemental O2 at home and has used breathing treatments at home, but reports her symptoms have not improved.   She denies any associated chest pain, fever, abdominal pain, back pain, cough, sputum production, nausea, vomiting, or any other complaints at this time.    The history is provided by the patient and the spouse.     Review of patient's allergies indicates:   Allergen Reactions    Aspirin      Other reaction(s): hx of ulcers    Tetracycline Swelling     Other reaction(s): Swelling    Penicillins Rash     Other reaction(s): Hives  Other reaction(s): Rash  Other reaction(s):  Rash  Other reaction(s): Hives     Past Medical History:   Diagnosis Date    Anemia     Bilateral renal cysts     Cataract     Chronic LBP 7/26/2012    Chronic pain     CKD (chronic kidney disease), stage V     COPD (chronic obstructive pulmonary disease)     Dehydration     Encounter for blood transfusion     HTN (hypertension)     Lumbar spondylosis     Melanoma     of the lip    Metabolic bone disease     Migraines, neuralgic     Osteoporosis     Primary osteoarthritis of both knees     s/p Rt TKA    Pulmonary embolism with infarction     Seizures     Subdeltoid bursitis, L>R. 9/27/2012    Ulcer     Vitamin D deficiency disease      Past Surgical History:   Procedure Laterality Date    BLADDER SUSPENSION      CATARACT EXTRACTION  11/18/13    left eye    CERVICAL LAMINECTOMY      x3, fusion x1    COLONOSCOPY  2009    HYSTERECTOMY      INSERTION, IOL PROSTHESIS Left 11/18/2013    [facility]    INSERTION, IOL PROSTHESIS Right 10/28/2013    [facility]    JOINT REPLACEMENT  2001    total right knee     LUMBAR LAMINECTOMY      x 3, fusion x1    OOPHORECTOMY      PHACOEMULSIFICATION, CATARACT Left 11/18/2013    [facility]    PHACOEMULSIFICATION, CATARACT Right 10/28/2013    [facility]     Family History   Problem Relation Age of Onset    Arthritis Mother     Stroke Mother     Hypertension Father     Cancer Father     Cataracts Father     Diabetes Maternal Aunt     Hypertension Maternal Grandfather     Heart disease Maternal Grandfather     Heart attack Maternal Grandfather     Cataracts Sister     Glaucoma Cousin      Social History     Tobacco Use    Smoking status: Former Smoker     Types: Cigarettes    Smokeless tobacco: Never Used   Substance Use Topics    Alcohol use: Yes     Alcohol/week: 0.6 oz     Types: 1 Glasses of wine per week     Comment:  Rare    Drug use: No     Review of Systems   Constitutional: Negative for chills and fever.   HENT: Negative for sore throat.    Respiratory: Positive for shortness of breath. Negative for cough.    Cardiovascular: Negative for chest pain.   Gastrointestinal: Negative for nausea and vomiting.   Genitourinary: Negative for dysuria, frequency and urgency.   Musculoskeletal: Negative for back pain.   Skin: Negative for rash and wound.   Neurological: Negative for syncope and weakness.   Hematological: Does not bruise/bleed easily.   Psychiatric/Behavioral: Negative for agitation, behavioral problems and confusion.       Physical Exam     Initial Vitals [09/13/19 1106]   BP Pulse Resp Temp SpO2   (!) 141/69 (!) 112 (!) 24 97.6 °F (36.4 °C) 95 %      MAP       --         Physical Exam    Nursing note and vitals reviewed.  Constitutional: She appears well-developed and well-nourished. She is not diaphoretic. No distress.   HENT:   Head: Normocephalic and atraumatic.   Mouth/Throat: Oropharynx is clear and moist.   Eyes: EOM are normal. Pupils are equal, round, and reactive to light.   Neck: No tracheal deviation present.   Cardiovascular: Regular rhythm, normal heart sounds and intact distal pulses. Tachycardia present.    HR 100s.   Pulmonary/Chest: No stridor. Tachypnea noted. No respiratory distress. She has wheezes. She has rales.   Bilateral rales, left greater than right.  Mild scattered wheezing.   Abdominal: Soft. Bowel sounds are normal. She exhibits no distension and no mass. There is no tenderness.   Musculoskeletal: Normal range of motion. She exhibits no edema.   Neurological: She is alert and oriented to person, place, and time. She has normal strength. No cranial nerve deficit or sensory deficit.   Skin: Skin is warm and dry. Capillary refill takes less than 2 seconds. No pallor.   Psychiatric: She has a normal mood and affect. Her behavior is normal. Thought content normal.         ED Course    Procedures  Labs Reviewed   CBC WITHOUT DIFFERENTIAL - Abnormal; Notable for the following components:       Result Value    RBC 3.76 (*)     Hemoglobin 10.3 (*)     Hematocrit 32.5 (*)     Mean Corpuscular Hemoglobin Conc 31.7 (*)     RDW 16.8 (*)     Platelets 362 (*)     All other components within normal limits   COMPREHENSIVE METABOLIC PANEL - Abnormal; Notable for the following components:    CO2 21 (*)     BUN, Bld 24 (*)     Creatinine 2.3 (*)     ALT 8 (*)     eGFR if  23 (*)     eGFR if non  20 (*)     All other components within normal limits   B-TYPE NATRIURETIC PEPTIDE - Abnormal; Notable for the following components:     (*)     All other components within normal limits   TROPONIN I     EKG Readings: (Independently Interpreted)   Sinus tachycardia, rate 111, low voltage QRS, non specific ST changes to inferior leads, no reciprocal elevation, no signs of ischemia, normal intervals. Stable from prior EKG on 07/2019.     ECG Results          EKG 12-lead (In process)  Result time 09/13/19 12:03:02    In process by Interface, Lab In [facility] (09/13/19 12:03:02)                 Narrative:    Test Reason : R06.02,    Vent. Rate : 111 BPM     Atrial Rate : 111 BPM     P-R Int : 176 ms          QRS Dur : 058 ms      QT Int : 322 ms       P-R-T Axes : 056 108 041 degrees     QTc Int : 437 ms    Sinus tachycardia  Rightward axis  Low voltage QRS  Cannot rule out Anteroseptal infarct (cited on or before 19-JUN-2019)  Abnormal ECG  When compared with ECG of 29-JUL-2019 12:16,  No significant change was found    Referred By: [email or web address]   SELF           Confirmed By:                             X-Rays:   Independently Interpreted Readings:   Other Readings:  Reviewed by myself, read by radiology.    Imaging Results          X-Ray Chest AP Portable (Final result)  Result time 09/13/19 12:12:51    Final result by Adria Arora MD (09/13/19 12:12:51)                 Impression:       Moderate-sized left pleural effusion similar to prior.      Electronically signed by: Adria Arora MD  Date:    09/13/2019  Time:    12:12             Narrative:    EXAMINATION:  XR CHEST AP PORTABLE    CLINICAL HISTORY:  SOB, pleural effusion;    TECHNIQUE:  Frontal view of the chest was performed.    COMPARISON:  07/29/2019    FINDINGS:  Multiple overlying cardiac monitoring leads. The cardiomediastinal silhouette is normal in size and midline. Pulmonary vascularity appears within normal limits.    Moderate-sized left pleural effusion similar to the prior.  Associated basilar atelectasis, but the mid to upper lung zones clear.  Right lung remains clear.                              Medical Decision Making:   Differential Diagnosis:   Differential Diagnosis includes, but is not limited to:  PE, MI/ACS, pneumothorax, pericardial effusion/tamonade, pneumonia, lung abscess, pericarditis/myocarditis, pleural effusion, lung mass, CHF exacerbation, asthma exacerbation, COPD exacerbation, aspirated/ingested foreign body, airway obstruction, CO poisoning, anemia, metabolic derangement, allergy/atopy, influenza, viral URI, viral syndrome.  Clinical Tests:   Lab Tests: Ordered and Reviewed  Radiological Study: Ordered and Reviewed  Medical Tests: Ordered and Reviewed                   ED Course as of Sep 13 1447   Fri Sep 13, 2019   1139 74-year-old female with history of COPD on 2 L home O2, hypertension, CKD, recurrent left pleural effusion requiring thoracentesis, most recently 7/17, presents to ED due to worsening shortness of breath. She used her home nebulizer without much improvement.  On arrival, patient tachycardic and tachypneic.  Exam with moderate bibasilar rales, left greater than right.  Will obtain cardiac evaluation, basic labs, CXR, give DuoNeb, and continue to monitor.    [SS]   1333 Labs with YOSVANY, creatinine 2.3.  BNP elevated 500s.  CXR reveals recurrent left pleural effusion.  Patient remains  short of breath and tachypneic after DuoNebs.  I feel patient may benefit from repeat therapeutic thoracentesis.  Will consult pulmonology for evaluation.  Discussed with [facility] for admission.    [SS]      ED Course User Index  [SS] Roland Green MD     Clinical Impression:     1. Recurrent left pleural effusion    2. Shortness of breath    3. Dyspnea on exertion    4. Acute kidney injury        Disposition:   Disposition: Admitted  Condition: Stable         I, Dr. Roland Green, personally performed the services described in this documentation. All medical record entries made by the scribe were at my direction and in my presence.  I have reviewed the chart and agree that the record reflects my personal performance and is accurate and complete.     Roland Green MD.             Roland Green MD  09/14/19 5825

## 2019-09-13 NOTE — ASSESSMENT & PLAN NOTE
Cr---2.3; has been 1.4-2.0 over the past year.  -get renal u/s  -check urine studies  -may give gentle IV hydration  -avoid nephrotoxic agents  -renally dose meds  -monitor bmp

## 2019-09-13 NOTE — PLAN OF CARE
Problem: Adult Inpatient Plan of Care  Goal: Plan of Care Review  Outcome: Ongoing (interventions implemented as appropriate)  Plan of care reviewed with patient. Patient voiced understanding. ST on monitor. No acute distress noted. Side rails x 2, bed in lowest position, call bell within reach, pt advised to call for assistance. Maintain bed alarm for patient safety.

## 2019-09-13 NOTE — HPI
Ms. Katie Quevedo is a 75 y/o female who lives in [place], [place] at her residence with her . The patients PCP is Dr. Kingston Verduzco. She is also followed by Dr. Clemencia Gambino, Dr. Aleta Joseph with Pulmonology, Dr. Aura Diggs with Nephrology, and  Dr. Geovanni Interiano with Pulmonology. The patient has a past medical history of  COPD, Encounter for blood transfusion, HTN, lumbar spondylosis, melanoma---of the lip, primary osteoarthritis of both knees---s/p Rt TKA, seizures, anemia, bilateral renal cysts, cataract, chronic LBP--7/26/2012, chronic pain, CKD----stage V, dehydration, metabolic bone disease, migraines---neuralgic, osteoporosis, pulmonary embolism with infarction, subdeltoid bursitis, ulcer, and vitamin D deficiency disease. The patient has a past surgical history of bladder suspension, left eye cataract sx, cervical laminectomy x3, spinal fusion x1, colonoscopy, hysterectomy, oophorectomy, and TKR.     The patient presents to the ED due to shortness of breath for the past 4 days. The patient was seen about a month ago due to worsening shortness of breath. She was diagnosed with a left pleural effusion and had  thoracentesis done. She reports she felt better after this and followed up with her pulmonologist. She has yet to have VATS and pleural biopsy done. Patient denies having fluid drained from her lungs since being admitted to the hospital. Patient is on 2 L of supplemental O2 at home. She has tried breathing treatment at home with minimal relief. She denies chest pain, palpitations,  abdominal pain, back pain, cough, vomiting, or any other complaints at this time. She does report nausea for a while, but denies vomiting. She reports a poor appetite as well; patient states she has been unable to take any medications since the SOB has worsened.     The ED workup shows---stable hemoglobin, CO2--21, BUN--24, Cr---2.3, ALT--8, BNP---586, elevated troponin. CXR---Moderate-sized left  pleural effusion similar to prior. She will be admitted to the Hospital Medicine Service for further treatment.        /

## 2019-09-13 NOTE — ED NOTES
Pt sitting upright in bed, a/a/o. Pt states she feels improved. Pt still appears tachypneic, but improved. Family remains at bedside. Will continue to monitor.

## 2019-09-13 NOTE — H&P
[facility] Hospital Medicine  History & Physical    Patient Name: Katie Quevedo  MRN: [id number]  Admission Date: 9/13/2019  Attending Physician: Jennifer Bowles*   Primary Care Provider: Kingston Verduzco MD         Patient information was obtained from patient, past medical records and ER records.     Subjective:     Principal Problem:Pleural effusion    Chief Complaint:   Chief Complaint   Patient presents with    Shortness of Breath     Pt presents to ED today c/o SOB x 4 days pt reports she previously had fluid drained from lungs. pt presents on home o2 @ 2L via NC     Nausea        HPI: Ms. Katie Quevedo is a 75 y/o female who lives in [place], [place] at her residence with her . The patients PCP is Dr. Kingston Verduzco. She is also followed by Dr. Clemencia Gambino, Dr. Aleta Joseph with Pulmonology, Dr. Aura Diggs with Nephrology, and  Dr. Geovanni Interiano with Pulmonology. The patient has a past medical history of  COPD, Encounter for blood transfusion, HTN, lumbar spondylosis, melanoma---of the lip, primary osteoarthritis of both knees---s/p Rt TKA, seizures, anemia, bilateral renal cysts, cataract, chronic LBP--7/26/2012, chronic pain, CKD----stage V, dehydration, metabolic bone disease, migraines---neuralgic, osteoporosis, pulmonary embolism with infarction, subdeltoid bursitis, ulcer, and vitamin D deficiency disease. The patient has a past surgical history of bladder suspension, left eye cataract sx, cervical laminectomy x3, spinal fusion x1, colonoscopy, hysterectomy, oophorectomy, and TKR.     The patient presents to the ED due to shortness of breath for the past 4 days. The patient was seen about a month ago due to worsening shortness of breath. She was diagnosed with a left pleural effusion and had  thoracentesis done. She reports she felt better after this and followed up with her pulmonologist. She has yet to have VATS and pleural biopsy done. Patient denies  having fluid drained from her lungs since being admitted to the hospital. Patient is on 2 L of supplemental O2 at home. She has tried breathing treatment at home with minimal relief. She denies chest pain, palpitations,  abdominal pain, back pain, cough, vomiting, or any other complaints at this time. She does report nausea for a while, but denies vomiting. She reports a poor appetite as well; patient states she has been unable to take any medications since the SOB has worsened.     The ED workup shows---stable hemoglobin, CO2--21, BUN--24, Cr---2.3, ALT--8, BNP---586, elevated troponin. CXR---Moderate-sized left pleural effusion similar to prior. She will be admitted to the Hospital Medicine Service for further treatment.        /    Past Medical History:   Diagnosis Date    Anemia     Bilateral renal cysts     Cataract     Chronic LBP 7/26/2012    Chronic pain     CKD (chronic kidney disease), stage V     COPD (chronic obstructive pulmonary disease)     Dehydration     Encounter for blood transfusion     HTN (hypertension)     Lumbar spondylosis     Melanoma     of the lip    Metabolic bone disease     Migraines, neuralgic     Osteoporosis     Primary osteoarthritis of both knees     s/p Rt TKA    Pulmonary embolism with infarction     Seizures     Subdeltoid bursitis, L>R. 9/27/2012    Ulcer     Vitamin D deficiency disease        Past Surgical History:   Procedure Laterality Date    BLADDER SUSPENSION      CATARACT EXTRACTION  11/18/13    left eye    CERVICAL LAMINECTOMY      x3, fusion x1    COLONOSCOPY  2009    HYSTERECTOMY      INSERTION, IOL PROSTHESIS Left 11/18/2013    [facility]    INSERTION, IOL PROSTHESIS Right 10/28/2013    [facility]    JOINT REPLACEMENT  2001    total right knee     LUMBAR LAMINECTOMY      x 3, fusion x1    OOPHORECTOMY      PHACOEMULSIFICATION, CATARACT Left 11/18/2013    Performed by [facility] at [facility] OR    PHACOEMULSIFICATION, CATARACT Right 10/28/2013    [facility] at [facility] OR       Review of patient's allergies indicates:   Allergen Reactions    Aspirin      Other reaction(s): hx of ulcers    Tetracycline Swelling     Other reaction(s): Swelling    Penicillins Rash     Other reaction(s): Hives  Other reaction(s): Rash  Other reaction(s): Rash  Other reaction(s): Hives       No current facility-administered medications on file prior to encounter.      Current Outpatient Medications on File Prior to Encounter   Medication Sig    albuterol-ipratropium (DUO-NEB) 2.5 mg-0.5 mg/3 mL nebulizer solution NEBULIZE 1 VIAL 3 TIMES A DAY AS NEEDED FOR WHEEIZNG, SHORTNESS OF BREATH    amLODIPine (NORVASC) 10 MG tablet Take 10 mg by mouth once daily.    busPIRone (BUSPAR) 5 MG Tab Take 5 mg by mouth.    calcium-vitamin D3 (CALCIUM 500 + D) 500 mg(1,250mg) -200 unit per tablet Take 1 tablet by mouth 2 (two) times daily with meals.    diazePAM (VALIUM) 2 MG tablet Take 2 mg by mouth.    ergocalciferol (ERGOCALCIFEROL) 50,000 unit Cap TAKE 1 CAPSULE BY MOUTH EVERY 7 DAYS, FOR 12 WEEKS, THEN MONTHLY    HYDROcodone-acetaminophen (NORCO)  mg per tablet Take 1 tablet by mouth 3 (three) times daily as needed.    ipratropium-albuterol (COMBIVENT)  mcg/actuation inhaler Inhale 1 puff into the lungs every 4 (four) hours as needed for Wheezing. Rescue    sodium bicarbonate 650 MG tablet Take 1 tablet (650 mg total) by mouth 2 (two) times daily.    traZODone (DESYREL) 50 MG tablet Take 1 tablet (50 mg total) by mouth every evening.    zolpidem (AMBIEN) 5 MG Tab Take 5 mg by mouth once daily.    DULoxetine (CYMBALTA) 30 MG capsule Take 1 capsule (30 mg total) by mouth once daily.    fluticasone-salmeterol 500-50 mcg/dose (ADVAIR DISKUS) 500-50 mcg/dose DsDv diskus inhaler Inhale 1 puff into the lungs 2 (two) times daily. Controller    []  sulfamethoxazole-trimethoprim 800-160mg (BACTRIM DS) 800-160 mg Tab Take 1 tablet by mouth 2 (two) times daily. for 5 days     Family History     Problem Relation (Age of Onset)    Arthritis Mother    Cancer Father    Cataracts Father, Sister    Diabetes Maternal Aunt    Glaucoma Cousin    Heart attack Maternal Grandfather    Heart disease Maternal Grandfather    Hypertension Father, Maternal Grandfather    Stroke Mother        Tobacco Use    Smoking status: Former Smoker     Types: Cigarettes    Smokeless tobacco: Never Used   Substance and Sexual Activity    Alcohol use: Yes     Alcohol/week: 0.6 oz     Types: 1 Glasses of wine per week     Comment: Rare    Drug use: No    Sexual activity: Never     Review of Systems   Constitutional: Positive for activity change, appetite change and fatigue. Negative for chills, diaphoresis and fever.   HENT: Negative for trouble swallowing.    Eyes: Negative for visual disturbance.   Respiratory: Positive for shortness of breath. Negative for cough, wheezing and stridor.    Cardiovascular: Negative for chest pain, palpitations and leg swelling.   Gastrointestinal: Positive for nausea. Negative for abdominal distention, abdominal pain, blood in stool, constipation, diarrhea and vomiting.   Genitourinary: Negative for difficulty urinating and hematuria.   Musculoskeletal: Positive for back pain, gait problem and myalgias. Negative for arthralgias.   Skin: Positive for pallor. Negative for color change, rash and wound.   Neurological: Positive for weakness. Negative for dizziness, tremors, seizures, syncope, speech difficulty, light-headedness, numbness and headaches.   Hematological: Does not bruise/bleed easily.   Psychiatric/Behavioral: Negative for agitation, confusion, decreased concentration, dysphoric mood and hallucinations. The patient is not nervous/anxious and is not hyperactive.      Objective:     Vital Signs (Most Recent):  Temp: 97.6 °F (36.4 °C) (09/13/19  1106)  Pulse: 102 (09/13/19 1530)  Resp: (!) 21 (09/13/19 1530)  BP: 127/61 (09/13/19 1500)  SpO2: 95 % (09/13/19 1530) Vital Signs (24h Range):  Temp:  [97.6 °F (36.4 °C)] 97.6 °F (36.4 °C)  Pulse:  [102-112] 102  Resp:  [21-34] 21  SpO2:  [95 %-100 %] 95 %  BP: (117-141)/(61-77) 127/61     Weight: 59.9 kg (132 lb)  Body mass index is 24.14 kg/m².    Physical Exam   Constitutional: She is oriented to person, place, and time. She has a sickly appearance. She appears ill.   HENT:   Head: Normocephalic and atraumatic.   Eyes: Pupils are equal, round, and reactive to light.   Neck: Normal range of motion. Neck supple.   Cardiovascular: Normal rate, regular rhythm, normal heart sounds and intact distal pulses.   No murmur heard.  Pulmonary/Chest: Effort normal. No respiratory distress. She has no wheezes.   Abdominal: Soft. Bowel sounds are normal. She exhibits no distension. There is no tenderness. There is no guarding.   Musculoskeletal: She exhibits no edema or tenderness.   Neurological: She is alert and oriented to person, place, and time.   Skin: Skin is warm and dry. Capillary refill takes less than 2 seconds. She is not diaphoretic.   Psychiatric: She has a normal mood and affect. Her behavior is normal. Judgment and thought content normal.   Nursing note and vitals reviewed.        CRANIAL NERVES     CN III, IV, VI   Pupils are equal, round, and reactive to light.       Significant Labs:   CBC:   Recent Labs   Lab 09/13/19  1145   WBC 7.48   HGB 10.3*   HCT 32.5*   *     CMP:   Recent Labs   Lab 09/13/19  1145      K 5.0      CO2 21*   GLU 90   BUN 24*   CREATININE 2.3*   CALCIUM 10.0   PROT 8.0   ALBUMIN 4.3   BILITOT 0.3   ALKPHOS 102   AST 23   ALT 8*   ANIONGAP 14   EGFRNONAA 20*     Cardiac Markers:   Recent Labs   Lab 09/13/19  1145   *     Troponin:   Recent Labs   Lab 09/13/19  1145   TROPONINI 0.007       Significant Imaging: I have reviewed all pertinent imaging  results/findings within the past 24 hours.    Assessment/Plan:     Acute Kidney Injury Superimposed on CKD (chronic kidney disease), stage IV  Cr---2.3; has been 1.4-2.0 over the past year.  -get renal u/s  -check urine studies  -may give gentle IV hydration  -avoid nephrotoxic agents  -renally dose meds  -monitor bmp      Pleural effusion  Shortness of breath     -Pulmonary consulted  -will plan for thoracentesis on tomorrow  -ordered supplemental O2  -nebs prn ordered      Essential hypertension  Will monitor for now, b/p looks good.       Iron deficiency anemia  Stable at this time, follow.     COPD (chronic obstructive pulmonary disease) with chronic bronchitis  Chronic respiratory failure with hypoxia, on home O2 therapy    -supplemental O2 ordered  -will resume home meds  -prn nebs    Chronic neck pain  Chronic pain  Primary osteoarthritis of both knees  Lumbar postlaminectomy syndrome  Cervical post-laminectomy syndrome    -no issues noted at this time  -resume home med [place] 10 TID prn  -monitor              VTE Risk Mitigation (From admission, onward)        Ordered     Place sequential compression device  Until discontinued      09/13/19 1432     IP VTE HIGH RISK PATIENT  Once      09/13/19 1432             Jere Mercado NP  Department of Hospital Medicine   [facility]

## 2019-09-13 NOTE — ASSESSMENT & PLAN NOTE
Shortness of breath     -Pulmonary consulted  -will plan for thoracentesis on tomorrow  -ordered supplemental O2  -nebs prn ordered

## 2019-09-13 NOTE — CONSULTS
Pulmonary / Critical Care Medicine  Consult Note    Primary Attending:  Jennifer Bowles*   Primary Team: Hospital medicine   Consultant Attending: Ele   Consultant Fellow: Saumya Iglesias MD       Reason for Consult: recurrent pleural effusion      History of Present Illness:     Ms. Quevedo is a 75 yo F with PMHx COPD (FEV1 51%), chronic hypoxic respiratory failure (on 2L NC at home), CKD, recurrent left pleural effusion, HFpEF, pulmonary embolism who presented to [facility] with CC of progressive SOB for the past 4 days. States she has noted increasing SOB with exertion as well as decreased PO intake and nausea. She denies any fever, chills, diarrhea, constipation, sick contacts, chest pain, or lower extremity edema. Reports occasional dry cough that is unchanged. Denies wheezing. She reports she is unable to lie flat due to SOB. She reports decreased PO intake over the past few days as well due to feeling nauseated.     Ms. Quevedo has a recent history of recurrent pleural effusions for which she has had 2 thoracenteses performed (6/21 and 7/17), both results show exudate with lymphocyte predominance, cultures remain negative, and cytology negative for malignancy. Unclear etiology of effusion but there is concern for underlying malignancy. Last hospitalization in July, patient scheduled to have pleural bx and pleurodesis but was afraid of the procedure thus it was not performed. She then saw Dr. Duran @ [facility] 7/31 at which time she was open to the idea --plan after the visit was for PFTs and IGRA with referral for pleural bx and pleurodesis (PFTs have been performed and IGRA was negative). Patient states she has seen Dr. Manzanares at [place] but was awaiting cardiac clearance prior to surgery.       ROS: Comprehensive review of systems obtained and is negative except as noted in HPI.    PMHx:   Past Medical History:   Diagnosis Date    Anemia     Bilateral renal cysts     Cataract     Chronic LBP 7/26/2012     Chronic pain     CKD (chronic kidney disease), stage V     COPD (chronic obstructive pulmonary disease)     Dehydration     Encounter for blood transfusion     HTN (hypertension)     Lumbar spondylosis     Melanoma     of the lip    Metabolic bone disease     Migraines, neuralgic     Osteoporosis     Primary osteoarthritis of both knees     s/p Rt TKA    Pulmonary embolism with infarction     Seizures     Subdeltoid bursitis, L>R. 9/27/2012    Ulcer     Vitamin D deficiency disease        Home Meds:   No current facility-administered medications on file prior to encounter.      Current Outpatient Medications on File Prior to Encounter   Medication Sig Dispense Refill    albuterol-ipratropium (DUO-NEB) 2.5 mg-0.5 mg/3 mL nebulizer solution NEBULIZE 1 VIAL 3 TIMES A DAY AS NEEDED FOR WHEEIZNG, SHORTNESS OF BREATH 90 mL 11    amLODIPine (NORVASC) 10 MG tablet Take 10 mg by mouth once daily.      busPIRone (BUSPAR) 5 MG Tab Take 5 mg by mouth.      calcium-vitamin D3 (CALCIUM 500 + D) 500 mg(1,250mg) -200 unit per tablet Take 1 tablet by mouth 2 (two) times daily with meals.      diazePAM (VALIUM) 2 MG tablet Take 2 mg by mouth.      DULoxetine (CYMBALTA) 30 MG capsule Take 1 capsule (30 mg total) by mouth once daily. 30 capsule 3    ergocalciferol (ERGOCALCIFEROL) 50,000 unit Cap TAKE 1 CAPSULE BY MOUTH EVERY 7 DAYS, FOR 12 WEEKS, THEN MONTHLY      fluticasone-salmeterol 500-50 mcg/dose (ADVAIR DISKUS) 500-50 mcg/dose DsDv diskus inhaler Inhale 1 puff into the lungs 2 (two) times daily. Controller 60 each 11    HYDROcodone-acetaminophen (NORCO)  mg per tablet Take 1 tablet by mouth 3 (three) times daily as needed. 90 tablet 0    ipratropium-albuterol (COMBIVENT)  mcg/actuation inhaler Inhale 1 puff into the lungs every 4 (four) hours as needed for Wheezing. Rescue 1 Package 3    sodium bicarbonate 650 MG tablet Take 1 tablet (650 mg total) by mouth 2 (two) times daily. 120  tablet 11    [] sulfamethoxazole-trimethoprim 800-160mg (BACTRIM DS) 800-160 mg Tab Take 1 tablet by mouth 2 (two) times daily. for 5 days 10 tablet 0    traZODone (DESYREL) 50 MG tablet Take 1 tablet (50 mg total) by mouth every evening. 30 tablet 11    zolpidem (AMBIEN) 5 MG Tab Take 5 mg by mouth once daily.  5       PSHx:   Past Surgical History:   Procedure Laterality Date    BLADDER SUSPENSION      CATARACT EXTRACTION  13    left eye    CERVICAL LAMINECTOMY      x3, fusion x1    COLONOSCOPY      HYSTERECTOMY      INSERTION, IOL PROSTHESIS Left 2013    [facility]    INSERTION, IOL PROSTHESIS Right 10/28/2013    [facility]    JOINT REPLACEMENT      total right knee     LUMBAR LAMINECTOMY      x 3, fusion x1    OOPHORECTOMY      PHACOEMULSIFICATION, CATARACT Left 2013    [facility]    PHACOEMULSIFICATION, CATARACT Right 10/28/2013    [facility] at [facility] OR       Allergies:   Review of patient's allergies indicates:   Allergen Reactions    Aspirin      Other reaction(s): hx of ulcers    Tetracycline Swelling     Other reaction(s): Swelling    Penicillins Rash     Other reaction(s): Hives  Other reaction(s): Rash  Other reaction(s): Rash  Other reaction(s): Hives         SHX:   - Tobacco:  reports that she has quit smoking. Her smoking use included cigarettes. She has never used smokeless tobacco.  - EtOH:  reports that she drinks about 0.6 oz of alcohol per week.  - Illicit:   Social History     Substance and Sexual Activity   Drug Use No     - Occupation: Data Unavailable    FHx:   family history includes Arthritis in her mother; Cancer in her father; Cataracts in her father and sister; Diabetes in her maternal aunt; Glaucoma in her cousin; Heart attack in her maternal grandfather; Heart disease in her maternal grandfather; Hypertension in her father  and maternal grandfather; Stroke in her mother.    Current Meds:   Scheduled:       Continuous Infusions:       PRN:   ondansetron, ondansetron, sodium chloride 0.9%    VITAL SIGNS:   Temp:  [97.6 °F (36.4 °C)]   Pulse:  [104-112]   Resp:  [24-34]   BP: (117-141)/(61-77)   SpO2:  [95 %-100 %]         Physical Exam   Gen: appears acutely ill, awake,alert, oriented, in noacute distress   HEENT: lips, mucosa, and tongue normal; teeth and gums normal  conjunctivae/corneas clear. PERRL.   CVS: regular rate and rhythm   Chest: normal respiratory effort, diminished breath sounds on left to upper lung zones, no wheezing noted    Abdomen: soft, non-tender non-distended; bowel sounds normal   Ext: no cyanosis or edema, or clubbing   Skin: no rashes, pale   Neuro: oriented, normal mood, moves all extremities            LABS:     Recent Labs   Lab 09/13/19  1145   WBC 7.48   RBC 3.76*   HGB 10.3*   HCT 32.5*   *   MCV 86   MCH 27.4   MCHC 31.7*      K 5.0      CO2 21*   BUN 24*   CREATININE 2.3*   ALT 8*   AST 23   ALKPHOS 102   BILITOT 0.3   PROT 8.0   ALBUMIN 4.3   TROPONINI 0.007           CXR:    X-ray Chest Ap Portable    Result Date: 9/13/2019  Moderate-sized left pleural effusion similar to prior. Electronically signed by: Adria Arora MD Date:    09/13/2019 Time:    12:12         ASSESSMENT/PLAN:   Ms. Quevedo is a 75 yo F with PMHx COPD (FEV1 51%), chronic hypoxic respiratory failure (on 2L NC at home), CKD, recurrent left pleural effusion, HFpEF, pulmonary embolism who presented to [facility] with CC of progressive SOB for the past 4 days. Found to have recurrent left pleural effusion and pulmonary was consulted for recommendations.     #Recurrent Left pleural effusion: exudative, lymphocytic. Has had 2 thoracenteses performed (6/21 and 7/17), both exudative with lymphocyte predominance, cultures remain negative, and cytology negative for malignancy. Unclear etiology of effusion but there is concern for  underlying malignancy. Last hospitalization in July, patient scheduled to have pleural bx and pleurodesis but was afraid of the procedure thus it was not performed. She then saw Dr. Duran @ [facility] 7/31 at which time she was open to the idea --plan after the visit was for PFTs and IGRA with referral for pleural bx and pleurodesis (PFTs have been performed and IGRA was negative). Patient states she has seen Dr. Manzanares at [place] but was awaiting cardiac clearance prior to surgery.   - on bedside US, has moderate size left pleural effusion that is free flowing.  - she is breathing comfortably on her home 2L NC    - reevaluate in AM, possibly perform repeat thoracentesis for symptom control vs discussion of going forward with planned procedure..  - need to ensure has follow up for pleural biopsy and pleurodesis, per patient she was referred to Dr. Manzanares at [facility] and was awaiting cardiology clearance prior to surgery. Has completed PFTs (8/31) for pre-op evalutaion.     #COPD with chronic hypoxic respiratory failure:   - no wheezing on exam, on baseline 2L NC  - continue home advair with duonebs prn    #YOSVANY on CKD: baseline cr 1.8-2.0, 2.3 on admission, unclear If volume down or up.  on exam appears dry, and patient reports poor po intake over past few days. No b-lines on bedside US.   - urine studies  - strict I/Os  - repeat BMP in AM    #H/o PE: reported history of PE but no anticoagulation listed on medication list. Need to address this.         Saumya Iglesias MD  Pulmonary / Critical Care Fellow, PGYIV  Pager: [phone]  09/13/2019  4:09 PM

## 2019-09-13 NOTE — ASSESSMENT & PLAN NOTE
Chronic pain  Primary osteoarthritis of both knees  Lumbar postlaminectomy syndrome  Cervical post-laminectomy syndrome    -no issues noted at this time  -resume home med [place] 10 TID prn  -monitor

## 2019-09-13 NOTE — ASSESSMENT & PLAN NOTE
Chronic respiratory failure with hypoxia, on home O2 therapy    -supplemental O2 ordered  -will resume home meds  -prn nebs

## 2019-09-14 LAB
ANION GAP SERPL CALC-SCNC: 10 MMOL/L (ref 8–16)
BASOPHILS # BLD AUTO: 0.01 K/UL (ref 0–0.2)
BASOPHILS NFR BLD: 0.2 % (ref 0–1.9)
BILIRUB UR QL STRIP: NEGATIVE
BUN SERPL-MCNC: 23 MG/DL (ref 8–23)
CALCIUM SERPL-MCNC: 9.7 MG/DL (ref 8.7–10.5)
CHLORIDE SERPL-SCNC: 102 MMOL/L (ref 95–110)
CLARITY UR: CLEAR
CO2 SERPL-SCNC: 23 MMOL/L (ref 23–29)
COLOR UR: YELLOW
CREAT SERPL-MCNC: 2.2 MG/DL (ref 0.5–1.4)
CREAT UR-MCNC: 87 MG/DL (ref 15–325)
DIFFERENTIAL METHOD: ABNORMAL
EOSINOPHIL # BLD AUTO: 0.3 K/UL (ref 0–0.5)
EOSINOPHIL NFR BLD: 5 % (ref 0–8)
ERYTHROCYTE [DISTWIDTH] IN BLOOD BY AUTOMATED COUNT: 17.2 % (ref 11.5–14.5)
EST. GFR  (AFRICAN AMERICAN): 24 ML/MIN/1.73 M^2
EST. GFR  (NON AFRICAN AMERICAN): 21 ML/MIN/1.73 M^2
GLUCOSE SERPL-MCNC: 70 MG/DL (ref 70–110)
GLUCOSE UR QL STRIP: NEGATIVE
HCT VFR BLD AUTO: 31.7 % (ref 37–48.5)
HGB BLD-MCNC: 9.9 G/DL (ref 12–16)
HGB UR QL STRIP: ABNORMAL
KETONES UR QL STRIP: NEGATIVE
LEUKOCYTE ESTERASE UR QL STRIP: NEGATIVE
LYMPHOCYTES # BLD AUTO: 1.3 K/UL (ref 1–4.8)
LYMPHOCYTES NFR BLD: 20.1 % (ref 18–48)
MCH RBC QN AUTO: 27.4 PG (ref 27–31)
MCHC RBC AUTO-ENTMCNC: 31.2 G/DL (ref 32–36)
MCV RBC AUTO: 88 FL (ref 82–98)
MONOCYTES # BLD AUTO: 0.5 K/UL (ref 0.3–1)
MONOCYTES NFR BLD: 8.5 % (ref 4–15)
NEUTROPHILS # BLD AUTO: 4.1 K/UL (ref 1.8–7.7)
NEUTROPHILS NFR BLD: 66.2 % (ref 38–73)
NITRITE UR QL STRIP: NEGATIVE
PH UR STRIP: 6 [PH] (ref 5–8)
PLATELET # BLD AUTO: 312 K/UL (ref 150–350)
PMV BLD AUTO: 9.3 FL (ref 9.2–12.9)
POTASSIUM SERPL-SCNC: 4.8 MMOL/L (ref 3.5–5.1)
PROT UR QL STRIP: ABNORMAL
RBC # BLD AUTO: 3.61 M/UL (ref 4–5.4)
SODIUM SERPL-SCNC: 135 MMOL/L (ref 136–145)
SP GR UR STRIP: 1.02 (ref 1–1.03)
URN SPEC COLLECT METH UR: ABNORMAL
UROBILINOGEN UR STRIP-ACNC: NEGATIVE EU/DL
WBC # BLD AUTO: 6.22 K/UL (ref 3.9–12.7)

## 2019-09-14 PROCEDURE — 27000221 HC OXYGEN, UP TO 24 HOURS: Mod: HCNC

## 2019-09-14 PROCEDURE — 36415 COLL VENOUS BLD VENIPUNCTURE: CPT | Mod: HCNC

## 2019-09-14 PROCEDURE — 85025 COMPLETE CBC W/AUTO DIFF WBC: CPT | Mod: HCNC

## 2019-09-14 PROCEDURE — 81003 URINALYSIS AUTO W/O SCOPE: CPT | Mod: HCNC

## 2019-09-14 PROCEDURE — 82570 ASSAY OF URINE CREATININE: CPT | Mod: HCNC

## 2019-09-14 PROCEDURE — 96375 TX/PRO/DX INJ NEW DRUG ADDON: CPT | Performed by: EMERGENCY MEDICINE

## 2019-09-14 PROCEDURE — 94761 N-INVAS EAR/PLS OXIMETRY MLT: CPT | Mod: HCNC

## 2019-09-14 PROCEDURE — 80048 BASIC METABOLIC PNL TOTAL CA: CPT | Mod: HCNC

## 2019-09-14 PROCEDURE — 96376 TX/PRO/DX INJ SAME DRUG ADON: CPT | Performed by: EMERGENCY MEDICINE

## 2019-09-14 PROCEDURE — 94640 AIRWAY INHALATION TREATMENT: CPT | Mod: HCNC

## 2019-09-14 PROCEDURE — 63600175 PHARM REV CODE 636 W HCPCS: Mod: HCNC | Performed by: NURSE PRACTITIONER

## 2019-09-14 PROCEDURE — G0378 HOSPITAL OBSERVATION PER HR: HCPCS | Mod: HCNC

## 2019-09-14 PROCEDURE — 25000003 PHARM REV CODE 250: Mod: HCNC | Performed by: NURSE PRACTITIONER

## 2019-09-14 PROCEDURE — 25000242 PHARM REV CODE 250 ALT 637 W/ HCPCS: Mod: HCNC | Performed by: NURSE PRACTITIONER

## 2019-09-14 PROCEDURE — 25000242 PHARM REV CODE 250 ALT 637 W/ HCPCS: Mod: HCNC | Performed by: STUDENT IN AN ORGANIZED HEALTH CARE EDUCATION/TRAINING PROGRAM

## 2019-09-14 PROCEDURE — 96372 THER/PROPH/DIAG INJ SC/IM: CPT | Mod: 59 | Performed by: EMERGENCY MEDICINE

## 2019-09-14 PROCEDURE — 63600175 PHARM REV CODE 636 W HCPCS: Mod: HCNC | Performed by: STUDENT IN AN ORGANIZED HEALTH CARE EDUCATION/TRAINING PROGRAM

## 2019-09-14 PROCEDURE — 63600175 PHARM REV CODE 636 W HCPCS: Mod: HCNC | Performed by: FAMILY MEDICINE

## 2019-09-14 PROCEDURE — 99900035 HC TECH TIME PER 15 MIN (STAT): Mod: HCNC

## 2019-09-14 RX ORDER — TIOTROPIUM BROMIDE 18 UG/1
1 CAPSULE ORAL; RESPIRATORY (INHALATION) DAILY
Status: DISCONTINUED | OUTPATIENT
Start: 2019-09-15 | End: 2019-09-14

## 2019-09-14 RX ORDER — FUROSEMIDE 10 MG/ML
80 INJECTION INTRAMUSCULAR; INTRAVENOUS ONCE
Status: COMPLETED | OUTPATIENT
Start: 2019-09-14 | End: 2019-09-14

## 2019-09-14 RX ORDER — ENOXAPARIN SODIUM 100 MG/ML
1 INJECTION SUBCUTANEOUS
Status: DISCONTINUED | OUTPATIENT
Start: 2019-09-14 | End: 2019-09-15

## 2019-09-14 RX ORDER — TIOTROPIUM BROMIDE 18 UG/1
1 CAPSULE ORAL; RESPIRATORY (INHALATION) DAILY
Status: DISCONTINUED | OUTPATIENT
Start: 2019-09-14 | End: 2019-09-16 | Stop reason: HOSPADM

## 2019-09-14 RX ORDER — HYDROCODONE BITARTRATE AND ACETAMINOPHEN 10; 325 MG/1; MG/1
1 TABLET ORAL EVERY 4 HOURS PRN
Status: DISCONTINUED | OUTPATIENT
Start: 2019-09-14 | End: 2019-09-16 | Stop reason: HOSPADM

## 2019-09-14 RX ADMIN — ONDANSETRON 4 MG: 4 TABLET, ORALLY DISINTEGRATING ORAL at 10:09

## 2019-09-14 RX ADMIN — TIOTROPIUM BROMIDE 18 MCG: 18 CAPSULE ORAL; RESPIRATORY (INHALATION) at 04:09

## 2019-09-14 RX ADMIN — HYDROCODONE BITARTRATE AND ACETAMINOPHEN 1 TABLET: 10; 325 TABLET ORAL at 05:09

## 2019-09-14 RX ADMIN — HYDROCODONE BITARTRATE AND ACETAMINOPHEN 1 TABLET: 10; 325 TABLET ORAL at 06:09

## 2019-09-14 RX ADMIN — FUROSEMIDE 80 MG: 10 INJECTION, SOLUTION INTRAMUSCULAR; INTRAVENOUS at 03:09

## 2019-09-14 RX ADMIN — HYDROCODONE BITARTRATE AND ACETAMINOPHEN 1 TABLET: 10; 325 TABLET ORAL at 09:09

## 2019-09-14 RX ADMIN — IPRATROPIUM BROMIDE AND ALBUTEROL SULFATE 3 ML: .5; 3 SOLUTION RESPIRATORY (INHALATION) at 12:09

## 2019-09-14 RX ADMIN — IPRATROPIUM BROMIDE AND ALBUTEROL SULFATE 3 ML: .5; 3 SOLUTION RESPIRATORY (INHALATION) at 08:09

## 2019-09-14 RX ADMIN — HYDROCODONE BITARTRATE AND ACETAMINOPHEN 1 TABLET: 10; 325 TABLET ORAL at 10:09

## 2019-09-14 RX ADMIN — ENOXAPARIN SODIUM 60 MG: 60 INJECTION, SOLUTION INTRAVENOUS; SUBCUTANEOUS at 05:09

## 2019-09-14 RX ADMIN — ONDANSETRON 4 MG: 2 INJECTION INTRAMUSCULAR; INTRAVENOUS at 04:09

## 2019-09-14 RX ADMIN — IPRATROPIUM BROMIDE AND ALBUTEROL SULFATE 3 ML: .5; 3 SOLUTION RESPIRATORY (INHALATION) at 03:09

## 2019-09-14 RX ADMIN — MORPHINE SULFATE 2 MG: 4 INJECTION INTRAVENOUS at 12:09

## 2019-09-14 NOTE — ASSESSMENT & PLAN NOTE
Chronic pain  Primary osteoarthritis of both knees  Lumbar postlaminectomy syndrome  Cervical post-laminectomy syndrome    -no issues noted at this time  -resume home med [place] 10 TID prn  -monitor

## 2019-09-14 NOTE — SUBJECTIVE & OBJECTIVE
Interval History: She is in bed AAOx4, no acute distress noted. She is in no pain at this time. She has her  at the bedside, will cont to follow.     Review of Systems   Constitutional: Positive for activity change and appetite change. Negative for chills, diaphoresis, fatigue and fever.   HENT: Negative for trouble swallowing.    Eyes: Negative for visual disturbance.   Respiratory: Positive for shortness of breath. Negative for cough and wheezing.    Cardiovascular: Negative for chest pain, palpitations and leg swelling.   Gastrointestinal: Negative for abdominal distention, abdominal pain, blood in stool, constipation, diarrhea, nausea and vomiting.   Genitourinary: Negative for difficulty urinating and hematuria.   Musculoskeletal: Positive for gait problem. Negative for arthralgias, back pain and myalgias.   Skin: Positive for pallor. Negative for color change, rash and wound.   Neurological: Positive for weakness. Negative for dizziness, seizures, speech difficulty, light-headedness and numbness.   Hematological: Does not bruise/bleed easily.   Psychiatric/Behavioral: Negative for agitation, confusion and hallucinations. The patient is not nervous/anxious.      Objective:     Vital Signs (Most Recent):  Temp: 97.2 °F (36.2 °C) (09/14/19 1209)  Pulse: 108 (09/14/19 1209)  Resp: 18 (09/14/19 1209)  BP: 134/61 (09/14/19 1209)  SpO2: (!) 91 % (09/14/19 1209) Vital Signs (24h Range):  Temp:  [96.2 °F (35.7 °C)-97.5 °F (36.4 °C)] 97.2 °F (36.2 °C)  Pulse:  [] 108  Resp:  [17-26] 18  SpO2:  [91 %-96 %] 91 %  BP: (118-147)/(55-75) 134/61     Weight: 61.2 kg (134 lb 14.7 oz)  Body mass index is 24.68 kg/m².    Intake/Output Summary (Last 24 hours) at 9/14/2019 1526  Last data filed at 9/14/2019 0800  Gross per 24 hour   Intake 450 ml   Output 150 ml   Net 300 ml      Physical Exam   Constitutional: She is oriented to person, place, and time. She appears well-developed and well-nourished.   HENT:   Head:  Normocephalic and atraumatic.   Eyes: Pupils are equal, round, and reactive to light. Conjunctivae and EOM are normal.   Neck: Normal range of motion. Neck supple. No JVD present.   Cardiovascular: Normal rate, regular rhythm, normal heart sounds and intact distal pulses.   No murmur heard.  Pulmonary/Chest: Effort normal. No respiratory distress. She has no wheezes.   Abdominal: Soft. Bowel sounds are normal. She exhibits no distension. There is no tenderness. There is no guarding.   Musculoskeletal: Normal range of motion. She exhibits no edema or tenderness.   Neurological: She is alert and oriented to person, place, and time.   Skin: Skin is warm and dry. Capillary refill takes less than 2 seconds. No erythema. There is pallor.   Psychiatric: She has a normal mood and affect. Her behavior is normal. Judgment and thought content normal.   Nursing note and vitals reviewed.      Significant Labs:   BMP:   Recent Labs   Lab 09/14/19  0549   GLU 70   *   K 4.8      CO2 23   BUN 23   CREATININE 2.2*   CALCIUM 9.7     CBC:   Recent Labs   Lab 09/13/19  1145 09/14/19  0549   WBC 7.48 6.22   HGB 10.3* 9.9*   HCT 32.5* 31.7*   * 312       Significant Imaging: I have reviewed all pertinent imaging results/findings within the past 24 hours.

## 2019-09-14 NOTE — NURSING
Notified NP Chairs that pt's complaining of pain on back rated as 8 out of 10 currently. Informed that pt received hydrocodone 10mg at 1736 and it's ordered as 3 times as needed.  Asked for other pain medication for the pt for now.  NP will order morphine.

## 2019-09-14 NOTE — PROGRESS NOTES
Pulmonary & Critical Care Medicine Progress Note    Subjective:   No overnight events. Patient is having chronic pain in her lower back and R shoulder. She is on 2L NC saturating in the low 90's. She continues to feel SOB.    Vital Signs:   Vitals:    19 1209   BP: 134/61   Pulse: 108   Resp: 18   Temp: 97.2 °F (36.2 °C)     Fluid Balance:     Intake/Output Summary (Last 24 hours) at 2019 1546  Last data filed at 2019 0800  Gross per 24 hour   Intake 450 ml   Output 150 ml   Net 300 ml     Physical Exam:   General: Sitting up comfortably, NAD  HEENT: NC/AT, PERRL, EOMI, no scleral icterus, moist mucous membranes  Neck: Supple without JVD, trachea midline  Cardiac: S1S2 auscultated, RRR, no murmurs  Resp: No increased work of breathing. Good inspiratory effort. Bilateral wet crackles present.  Abd: Soft, NT/ND, +BS, no HSM  Ext: No edema, no cyanosis, 2+ pulses present  Skin: Warm and dry, no rashes, no lesions, no jaundice  Neuro: AAOx3, CN II-XII grossly intact, no focal deficits    Laboratory Studies:   No results for input(s): PH, PCO2, PO2, HCO3, POCSATURATED, BE in the last 24 hours.  Recent Labs   Lab 19  0549   WBC 6.22   RBC 3.61*   HGB 9.9*   HCT 31.7*      MCV 88   MCH 27.4   MCHC 31.2*     Recent Labs   Lab 19  0549   *   K 4.8      CO2 23   BUN 23   CREATININE 2.2*     Microbiology Data:   Microbiology Results (last 7 days)     ** No results found for the last 168 hours. **         Chest Imagin/13 CXR shows small-mod sized L-sided pleural effusion    Scheduled Medications:    furosemide  80 mg Intravenous Once    traZODone  50 mg Oral QHS     PRN Medications:   albuterol-ipratropium, HYDROcodone-acetaminophen, influenza, ondansetron, ondansetron, sodium chloride 0.9%    Assessment & Plan:   Patient Active Problem List   Diagnosis    Melanoma    Chronic pain    Vitamin deficiency    Cervical post-laminectomy syndrome    Lumbar postlaminectomy  syndrome    Chronic neck pain    Lumbosacral spondylosis without myelopathy    Isolated cervical dystonia    Primary osteoarthritis of both knees    Subdeltoid bursitis, L>R.    Other fragments of torsion dystonia    Nuclear sclerosis - Both Eyes    Rotator cuff tear, right    Biceps tendonitis    COPD (chronic obstructive pulmonary disease) with chronic bronchitis    Osteoarthritis, hip, bilateral    Lumbar radiculopathy, BLE    Cervical radiculopathy, BUE    Osteoporosis    Chronic low back pain    Iron deficiency anemia    Essential hypertension    Atrophy of left kidney    Kidney cysts    History of colon polyps    Medical non-compliance    Pleural effusion    Pericardial effusion    Chronic respiratory failure with hypoxia, on home O2 therapy    Acute kidney injury superimposed on chronic kidney disease stage IV    Anemia, chronic renal failure, stage 4 (severe)    Lung mass    Shortness of breath     Ms. Quevedo is a 75 yo F with a PMH of HTN, COPD, and CKD who presents to [facility] on 9/13 for acute worsening of SOB over the course of a few days. CXR upon arrival showed small-moderate L-sided pleural effusion.    Upon looking back through the chart, patient has had recurrent pleural effusions. Two thoracenteses have been done, one on 6/20 and the other on 7/17. The first thoracentesis showed 90% lymphocytes. The second showed 66% lymphocytes. Patient has been referred to Dr. Perdue for VATs with pleural biopsy and pleurodesis, however, is still pending cardiology clearance.    1. Acute dyspnea  2. Dilated IVC on bedside U/S  3. Septal motion abnormality on bedside U/S  4. Recurrent L-sided pleural effusion  5. Hypoxic Respiratory Failure  6. YOSVANY on CKD    Pulmonary team performed bedside cardiac and lung U/S today. Cardiac U/S showed septal flattening/bowing and very dilated IVC. B-lines noted throughout both lungs. L-sided effusion is small and without septations.    1. Acute  dyspnea  -At this time, patient's pleural effusion does not explain her dyspnea.  -Patient had bilateral crackles, b-lines on U/S, and very plump IVC without any collapsibility. BNP is 586, the highest it has ever been. Lasix 80 mg IV x1 ordered. This could also explain the YOSVANY on CKD.  -Additionally, septal motion abnormality noted on U/S is concerning for possible PE. Lower extremity U/S, V/Q scan, and repeat 2D ECHO have been ordered. Would start patient on Heparin drip vs Lovenox for anticoagulation at this time. It is important to note that patient had a high-probability V/Q scan on 3/5/18.    2. L-sided recurrent pleural effusion  -Unclear etiology at this time. Lymphocytic predominant in the past. Cytology has been (-) in the past.   -Pleural biopsy and pleurodesis pending with Dr. Perdue. Patient should have follow up appointment with him at discharge.   -Additionally, if patient cannot obtain inpatient cardiology clearance, please ensure that patient has outpatient cardiology appointment for clearance prior to appointment with Dr. Perdue.  -No thoracentesis at this time.    3. COPD  -PFT 8/31 shows moderate-severe COPD (FEV1 0.98L 52%) with airtrapping and DLCO reduction (55.5%).  -Daily dosing with Spiriva started in the hospital.  -Should be discharged with Spiriva inhaler and close pulmonary follow up.    DVT ppx:   Code status: Full code    Thank you for involving us in the care of this patient. We will continue to follow along. Please call with any questions.    Roxana Matta MD  [employer] PCCM Fellow, PGY 6  [facility]  Pager: [phone]

## 2019-09-14 NOTE — PLAN OF CARE
Problem: Adult Inpatient Plan of Care  Goal: Plan of Care Review  Outcome: Ongoing (interventions implemented as appropriate)     09/14/19 0580   Plan of Care Review   Plan of Care Reviewed With patient   POC reviewed with the pt, verbalized understanding.  AAOx3, VSS.  Complaint of pain on back, prn norco and one time dose of IV morphine given.  Heating pads applied.  No other distress or any other complaint of pain throughout night.  Tachypnea/ dyspnea noted with activity.  On 2L oxygen via NC, prn breathing treatment received.  On continuous telemetry monitor, SR to ST with HR in 90s-110s.  No ectopy noted.  Urine sample collected.  Possible thoracentesis in am.  Safety maintained, free of falls throughout shift.  Instructed to call for any assistance.  Will continue to monitor.

## 2019-09-14 NOTE — PROGRESS NOTES
[facility] Hospital Medicine  Progress Note    Patient Name: Katie Quevedo  MRN: [id number]  Patient Class: OP- Observation   Admission Date: 9/13/2019  Length of Stay: 0 days  Attending Physician: Jennifer Bowles*  Primary Care Provider: Kingston Verduzco MD        Subjective:     Principal Problem:Pleural effusion        HPI:  Ms. Katie Quevedo is a 75 y/o female who lives in [place], [place] at her residence with her . The patients PCP is Dr. Kingston Verduzco. She is also followed by Dr. Clemencia Gambino, Dr. Aleta Joseph with Pulmonology, Dr. Aura Diggs with Nephrology, and  Dr. Geovanni Interiano with Pulmonology. The patient has a past medical history of  COPD, Encounter for blood transfusion, HTN, lumbar spondylosis, melanoma---of the lip, primary osteoarthritis of both knees---s/p Rt TKA, seizures, anemia, bilateral renal cysts, cataract, chronic LBP--7/26/2012, chronic pain, CKD----stage V, dehydration, metabolic bone disease, migraines---neuralgic, osteoporosis, pulmonary embolism with infarction, subdeltoid bursitis, ulcer, and vitamin D deficiency disease. The patient has a past surgical history of bladder suspension, left eye cataract sx, cervical laminectomy x3, spinal fusion x1, colonoscopy, hysterectomy, oophorectomy, and TKR.     The patient presents to the ED due to shortness of breath for the past 4 days. The patient was seen about a month ago due to worsening shortness of breath. She was diagnosed with a left pleural effusion and had  thoracentesis done. She reports she felt better after this and followed up with her pulmonologist. She has yet to have VATS and pleural biopsy done. Patient denies having fluid drained from her lungs since being admitted to the hospital. Patient is on 2 L of supplemental O2 at home. She has tried breathing treatment at home with minimal relief. She denies chest pain, palpitations,  abdominal pain, back pain, cough, vomiting, or  any other complaints at this time. She does report nausea for a while, but denies vomiting. She reports a poor appetite as well; patient states she has been unable to take any medications since the SOB has worsened.     The ED workup shows---stable hemoglobin, CO2--21, BUN--24, Cr---2.3, ALT--8, BNP---586, elevated troponin. CXR---Moderate-sized left pleural effusion similar to prior. She will be admitted to the Hospital Medicine Service for further treatment.        /    Overview/Hospital Course:  She was admitted to the hospital medicine service for further treatment. Pulm was consulted---planning bedside thoracentesis for today.     Interval History: She is in bed AAOx4, no acute distress noted. She is in no pain at this time. She has her  at the bedside, will cont to follow.     Review of Systems   Constitutional: Positive for activity change and appetite change. Negative for chills, diaphoresis, fatigue and fever.   HENT: Negative for trouble swallowing.    Eyes: Negative for visual disturbance.   Respiratory: Positive for shortness of breath. Negative for cough and wheezing.    Cardiovascular: Negative for chest pain, palpitations and leg swelling.   Gastrointestinal: Negative for abdominal distention, abdominal pain, blood in stool, constipation, diarrhea, nausea and vomiting.   Genitourinary: Negative for difficulty urinating and hematuria.   Musculoskeletal: Positive for gait problem. Negative for arthralgias, back pain and myalgias.   Skin: Positive for pallor. Negative for color change, rash and wound.   Neurological: Positive for weakness. Negative for dizziness, seizures, speech difficulty, light-headedness and numbness.   Hematological: Does not bruise/bleed easily.   Psychiatric/Behavioral: Negative for agitation, confusion and hallucinations. The patient is not nervous/anxious.      Objective:     Vital Signs (Most Recent):  Temp: 97.2 °F (36.2 °C) (09/14/19 1209)  Pulse: 108 (09/14/19  1209)  Resp: 18 (09/14/19 1209)  BP: 134/61 (09/14/19 1209)  SpO2: (!) 91 % (09/14/19 1209) Vital Signs (24h Range):  Temp:  [96.2 °F (35.7 °C)-97.5 °F (36.4 °C)] 97.2 °F (36.2 °C)  Pulse:  [] 108  Resp:  [17-26] 18  SpO2:  [91 %-96 %] 91 %  BP: (118-147)/(55-75) 134/61     Weight: 61.2 kg (134 lb 14.7 oz)  Body mass index is 24.68 kg/m².    Intake/Output Summary (Last 24 hours) at 9/14/2019 1526  Last data filed at 9/14/2019 0800  Gross per 24 hour   Intake 450 ml   Output 150 ml   Net 300 ml      Physical Exam   Constitutional: She is oriented to person, place, and time. She appears well-developed and well-nourished.   HENT:   Head: Normocephalic and atraumatic.   Eyes: Pupils are equal, round, and reactive to light. Conjunctivae and EOM are normal.   Neck: Normal range of motion. Neck supple. No JVD present.   Cardiovascular: Normal rate, regular rhythm, normal heart sounds and intact distal pulses.   No murmur heard.  Pulmonary/Chest: Effort normal. No respiratory distress. She has no wheezes.   Abdominal: Soft. Bowel sounds are normal. She exhibits no distension. There is no tenderness. There is no guarding.   Musculoskeletal: Normal range of motion. She exhibits no edema or tenderness.   Neurological: She is alert and oriented to person, place, and time.   Skin: Skin is warm and dry. Capillary refill takes less than 2 seconds. No erythema. There is pallor.   Psychiatric: She has a normal mood and affect. Her behavior is normal. Judgment and thought content normal.   Nursing note and vitals reviewed.      Significant Labs:   BMP:   Recent Labs   Lab 09/14/19  0549   GLU 70   *   K 4.8      CO2 23   BUN 23   CREATININE 2.2*   CALCIUM 9.7     CBC:   Recent Labs   Lab 09/13/19  1145 09/14/19  0549   WBC 7.48 6.22   HGB 10.3* 9.9*   HCT 32.5* 31.7*   * 312       Significant Imaging: I have reviewed all pertinent imaging results/findings within the past 24 hours.      Assessment/Plan:      *  Pleural effusion  Shortness of breath     -Pulmonary consulted  -Planning for thoracentesis on today  -ordered supplemental O2  -nebs prn ordered      Acute kidney injury superimposed on chronic kidney disease stage IV  Cr---2.3>2.2; has been 1.4-2.0 over the past year.  -get renal u/s  -check urine studies  -may give gentle IV hydration  -avoid nephrotoxic agents  -renally dose meds  -monitor bmp      Essential hypertension  Will monitor for now, b/p looks good.       Iron deficiency anemia  Stable at this time, follow.     COPD (chronic obstructive pulmonary disease) with chronic bronchitis  Chronic respiratory failure with hypoxia, on home O2 therapy    -supplemental O2 ordered  -will resume home meds  -prn nebs    Chronic neck pain  Chronic pain  Primary osteoarthritis of both knees  Lumbar postlaminectomy syndrome  Cervical post-laminectomy syndrome    -no issues noted at this time  -resume home med [place] 10 TID prn  -monitor              VTE Risk Mitigation (From admission, onward)        Ordered     Place sequential compression device  Until discontinued      09/13/19 1432     IP VTE HIGH RISK PATIENT  Once      09/13/19 1432                Jere Mercado NP  Department of Hospital Medicine   [facility]

## 2019-09-14 NOTE — PROGRESS NOTES
VN cued into patient's room with patient's permission. Patient AAOx4 and resting in bed. Patient reports pain to back a 8/10. Patient appears SOB while resting in bed and reports nausea. Bedside nurse notified to administer PRN Zofran. Respiratory notified that patient is requesting PRN breathing treatment. Patient informed that she has pain medication that she can receive but that since she is nauseous we will wait for the Zofran to relieve her nausea before giving her anything by mouth. Patient verbalizes clear understanding. Patient states that she has not been receiving a lot of her home medications. Dr. Don notified. Fall risk protocol discussed with patient. VN instructed patient to call for assistance. Patient aware and agreeable. NAD noted. Allowed time for questions. Will continue to be available and intervene as needed.       09/14/19 2651   Type of Frequent Check   Type Patient Rounds  (VN rounds)   Safety/Activity   Patient Rounds bed in low position;bed wheels locked;call light in patient/parent reach;clutter free environment maintained;placement of personal items at bedside;visualized patient   Safety Promotion/Fall Prevention assistive device/personal item within reach;bed alarm set;Fall Risk reviewed with patient/family;Fall Risk signage in place;side rails raised x 2;instructed to call staff for mobility   Positioning   Body Position supine   Head of Bed (HOB) HOB elevated   Positioning/Transfer Devices pillows;in use   Pain/Comfort/Sleep   Preferred Pain Scale number (Numeric Rating Pain Scale)   Comfort/Acceptable Pain Level 0   Pain Body Location back   Pain Rating (0-10): Rest 8   Pain Rating (0-10): Activity 8   Sleep/Rest/Relaxation no problem identified;awake   Assessments (Pre/Post)   Level of Consciousness (AVPU) alert

## 2019-09-14 NOTE — PLAN OF CARE
09/14/19 1340   Discharge Assessment   Assessment Type Discharge Planning Assessment   Confirmed/corrected address and phone number on facesheet? Yes   Assessment information obtained from? Patient;Caregiver   Prior to hospitilization cognitive status: Alert/Oriented   Prior to hospitalization functional status: Independent;Assistive Equipment   Current cognitive status: Alert/Oriented   Current Functional Status: Independent;Assistive Equipment   Lives With spouse   Able to Return to Prior Arrangements yes   Is patient able to care for self after discharge? Yes   Who are your caregiver(s) and their phone number(s)? Dre Quevedo()[phone]  Chintan Stack(dtr)[phone]   Patient's perception of discharge disposition home or selfcare   Readmission Within the Last 30 Days no previous admission in last 30 days   Patient currently being followed by outpatient case management? No   Patient currently receives any other outside agency services? No   Equipment Currently Used at Home oxygen;cane, straight   Do you have any problems affording any of your prescribed medications? No   Is the patient taking medications as prescribed? yes   Does the patient have transportation home? Yes   Transportation Anticipated family or friend will provide   Does the patient receive services at the [facility]? No   Discharge Plan A Home with family   Discharge Plan B Home Health   DME Needed Upon Discharge  none   Patient/Family in Agreement with Plan yes   Principal Problem:Pleural effusion     Chief Complaint:        Chief Complaint   Patient presents with    Shortness of Breath       Pt presents to ED today c/o SOB x 4 days pt reports she previously had fluid drained from lungs. pt presents on home o2 @ 2L via NC     Nausea         HPI: Ms. Katie Quevedo is a 75 y/o female who lives in [place], [place] at her residence with her . The patients PCP is Dr. Kingston Verduzco. She is also followed by Dr. Clemencia Gambino,  Dr. Aleta Joseph with Pulmonology, Dr. Aura Diggs with Nephrology, and  Dr. Geovanni Interiano with Pulmonology. The patient has a past medical history of  COPD, Encounter for blood transfusion, HTN, lumbar spondylosis, melanoma---of the lip, primary osteoarthritis of both knees---s/p Rt TKA, seizures, anemia, bilateral renal cysts, cataract, chronic LBP--7/26/2012, chronic pain, CKD----stage V, dehydration, metabolic bone disease, migraines---neuralgic, osteoporosis, pulmonary embolism with infarction, subdeltoid bursitis, ulcer, and vitamin D deficiency disease. The patient has a past surgical history of bladder suspension, left eye cataract sx, cervical laminectomy x3, spinal fusion x1, colonoscopy, hysterectomy, oophorectomy, and TKR.      The patient presents to the ED due to shortness of breath for the past 4 days. The patient was seen about a month ago due to worsening shortness of breath. She was diagnosed with a left pleural effusion and had  thoracentesis done. She reports she felt better after this and followed up with her pulmonologist. She has yet to have VATS and pleural biopsy done. Patient denies having fluid drained from her lungs since being admitted to the hospital. Patient is on 2 L of supplemental O2 at home. She has tried breathing treatment at home with minimal relief. She denies chest pain, palpitations,  abdominal pain, back pain, cough, vomiting, or any other complaints at this time. She does report nausea for a while, but denies vomiting. She reports a poor appetite as well; patient states she has been unable to take any medications since the SOB has worsened.      The ED workup shows---stable hemoglobin, CO2--21, BUN--24, Cr---2.3, ALT--8, BNP---586, elevated troponin. CXR---Moderate-sized left pleural effusion similar to prior. She will be admitted to the Hospital Medicine Service for further treatment.                            The Sw met with the pt,her  and dtr Chintan to  complete her assessment. The pt's independent with her adl's and uses o2,a scooter and a straight cane at home. The pt still drives and her  or dtr will transport her home at d/c. The Sw wrote her contact info on the white board in the pt's room and gave her a d/c brochure. The Sw encouraged her to call should she have any questions or concerns.

## 2019-09-14 NOTE — PLAN OF CARE
09/14/19 1402   GALAVIZ Message   Medicare Outpatient and Observation Notification regarding financial responsibility Given to patient/caregiver;Explained to patient/caregiver;Signed/date by patient/caregiver   Date GALAVIZ was signed 09/14/19   Time GALAVIZ was signed 1342

## 2019-09-14 NOTE — ASSESSMENT & PLAN NOTE
Shortness of breath     -Pulmonary consulted  -Planning for thoracentesis on today  -ordered supplemental O2  -nebs prn ordered

## 2019-09-14 NOTE — ASSESSMENT & PLAN NOTE
Cr---2.3>2.2; has been 1.4-2.0 over the past year.  -get renal u/s  -check urine studies  -may give gentle IV hydration  -avoid nephrotoxic agents  -renally dose meds  -monitor bmp

## 2019-09-14 NOTE — PLAN OF CARE
VN completed admission questions with patient. Plan of care was discussed including VTE prophylaxis of SCDs.  All questions answered.  Kateryna Oneill NP notified that patient is questioning further antibiotic treatment for UTI symptoms. UA ordered and awaiting. Education given on safety measures and using call light before getting out of bed by herself. Patient verbalized understanding. Bed locked and in lowest position. Alarm on.

## 2019-09-14 NOTE — HOSPITAL COURSE
She was admitted to the hospital medicine service for further treatment. Pulm was consulted---originally planned a bedside thoracentesis, then reassessed to r/o PE. Lovenox 1mg/kg has been started. VQ scan---intermittent probability, bilateral lower ext u/s negative. Will given another dose of Lasix today 40 mg IV once. Will likely start PO lasix daily. Will need to follow up with Pulm outpatient. Will d/c Lovenox 1mg/kg at this time. Will treat with prophylactic Heparin. She did well with the IV lasix, will send the patient home with a dose of Lasix 20 mg PO worley to start on 9/19/2019. She will need to follow up with her PCP and pulmonary to complete outpatient testing.

## 2019-09-15 LAB
ANION GAP SERPL CALC-SCNC: 13 MMOL/L (ref 8–16)
BASOPHILS # BLD AUTO: 0.02 K/UL (ref 0–0.2)
BASOPHILS NFR BLD: 0.3 % (ref 0–1.9)
BUN SERPL-MCNC: 25 MG/DL (ref 8–23)
CALCIUM SERPL-MCNC: 9.7 MG/DL (ref 8.7–10.5)
CHLORIDE SERPL-SCNC: 96 MMOL/L (ref 95–110)
CO2 SERPL-SCNC: 22 MMOL/L (ref 23–29)
CREAT SERPL-MCNC: 2.5 MG/DL (ref 0.5–1.4)
DIFFERENTIAL METHOD: ABNORMAL
EOSINOPHIL # BLD AUTO: 0.5 K/UL (ref 0–0.5)
EOSINOPHIL NFR BLD: 7.3 % (ref 0–8)
ERYTHROCYTE [DISTWIDTH] IN BLOOD BY AUTOMATED COUNT: 16.9 % (ref 11.5–14.5)
EST. GFR  (AFRICAN AMERICAN): 21 ML/MIN/1.73 M^2
EST. GFR  (NON AFRICAN AMERICAN): 18 ML/MIN/1.73 M^2
GLUCOSE SERPL-MCNC: 92 MG/DL (ref 70–110)
HCT VFR BLD AUTO: 33.4 % (ref 37–48.5)
HGB BLD-MCNC: 10.3 G/DL (ref 12–16)
LYMPHOCYTES # BLD AUTO: 1.9 K/UL (ref 1–4.8)
LYMPHOCYTES NFR BLD: 26.5 % (ref 18–48)
MCH RBC QN AUTO: 26.9 PG (ref 27–31)
MCHC RBC AUTO-ENTMCNC: 30.8 G/DL (ref 32–36)
MCV RBC AUTO: 87 FL (ref 82–98)
MONOCYTES # BLD AUTO: 0.4 K/UL (ref 0.3–1)
MONOCYTES NFR BLD: 5.9 % (ref 4–15)
NEUTROPHILS # BLD AUTO: 4.3 K/UL (ref 1.8–7.7)
NEUTROPHILS NFR BLD: 60 % (ref 38–73)
PLATELET # BLD AUTO: 311 K/UL (ref 150–350)
PMV BLD AUTO: 9.1 FL (ref 9.2–12.9)
POTASSIUM SERPL-SCNC: 4.7 MMOL/L (ref 3.5–5.1)
RBC # BLD AUTO: 3.83 M/UL (ref 4–5.4)
SODIUM SERPL-SCNC: 131 MMOL/L (ref 136–145)
WBC # BLD AUTO: 7.25 K/UL (ref 3.9–12.7)

## 2019-09-15 PROCEDURE — 99900035 HC TECH TIME PER 15 MIN (STAT): Mod: HCNC

## 2019-09-15 PROCEDURE — 36415 COLL VENOUS BLD VENIPUNCTURE: CPT | Mod: HCNC

## 2019-09-15 PROCEDURE — 63600175 PHARM REV CODE 636 W HCPCS: Mod: HCNC | Performed by: NURSE PRACTITIONER

## 2019-09-15 PROCEDURE — 85025 COMPLETE CBC W/AUTO DIFF WBC: CPT | Mod: HCNC

## 2019-09-15 PROCEDURE — 63600175 PHARM REV CODE 636 W HCPCS: Mod: HCNC | Performed by: STUDENT IN AN ORGANIZED HEALTH CARE EDUCATION/TRAINING PROGRAM

## 2019-09-15 PROCEDURE — 27000221 HC OXYGEN, UP TO 24 HOURS: Mod: HCNC

## 2019-09-15 PROCEDURE — 80048 BASIC METABOLIC PNL TOTAL CA: CPT | Mod: HCNC

## 2019-09-15 PROCEDURE — 25000242 PHARM REV CODE 250 ALT 637 W/ HCPCS: Mod: HCNC | Performed by: STUDENT IN AN ORGANIZED HEALTH CARE EDUCATION/TRAINING PROGRAM

## 2019-09-15 PROCEDURE — 25000242 PHARM REV CODE 250 ALT 637 W/ HCPCS: Mod: HCNC | Performed by: NURSE PRACTITIONER

## 2019-09-15 PROCEDURE — 94761 N-INVAS EAR/PLS OXIMETRY MLT: CPT | Mod: HCNC

## 2019-09-15 PROCEDURE — 96372 THER/PROPH/DIAG INJ SC/IM: CPT | Mod: 59 | Performed by: EMERGENCY MEDICINE

## 2019-09-15 PROCEDURE — G0378 HOSPITAL OBSERVATION PER HR: HCPCS | Mod: HCNC

## 2019-09-15 PROCEDURE — 94640 AIRWAY INHALATION TREATMENT: CPT | Mod: HCNC

## 2019-09-15 PROCEDURE — 96376 TX/PRO/DX INJ SAME DRUG ADON: CPT | Mod: 59 | Performed by: EMERGENCY MEDICINE

## 2019-09-15 PROCEDURE — 25000003 PHARM REV CODE 250: Mod: HCNC | Performed by: NURSE PRACTITIONER

## 2019-09-15 RX ORDER — ENOXAPARIN SODIUM 100 MG/ML
40 INJECTION SUBCUTANEOUS EVERY 24 HOURS
Status: DISCONTINUED | OUTPATIENT
Start: 2019-09-15 | End: 2019-09-15

## 2019-09-15 RX ORDER — HEPARIN SODIUM 5000 [USP'U]/ML
5000 INJECTION, SOLUTION INTRAVENOUS; SUBCUTANEOUS EVERY 8 HOURS
Status: DISCONTINUED | OUTPATIENT
Start: 2019-09-15 | End: 2019-09-16 | Stop reason: HOSPADM

## 2019-09-15 RX ORDER — FUROSEMIDE 10 MG/ML
40 INJECTION INTRAMUSCULAR; INTRAVENOUS ONCE
Status: DISCONTINUED | OUTPATIENT
Start: 2019-09-15 | End: 2019-09-15

## 2019-09-15 RX ORDER — IPRATROPIUM BROMIDE AND ALBUTEROL SULFATE 2.5; .5 MG/3ML; MG/3ML
3 SOLUTION RESPIRATORY (INHALATION) EVERY 6 HOURS PRN
Status: DISCONTINUED | OUTPATIENT
Start: 2019-09-15 | End: 2019-09-16 | Stop reason: HOSPADM

## 2019-09-15 RX ORDER — FLUTICASONE PROPIONATE 50 MCG
2 SPRAY, SUSPENSION (ML) NASAL DAILY
Status: DISCONTINUED | OUTPATIENT
Start: 2019-09-15 | End: 2019-09-16 | Stop reason: HOSPADM

## 2019-09-15 RX ORDER — FUROSEMIDE 10 MG/ML
60 INJECTION INTRAMUSCULAR; INTRAVENOUS ONCE
Status: COMPLETED | OUTPATIENT
Start: 2019-09-15 | End: 2019-09-15

## 2019-09-15 RX ORDER — FUROSEMIDE 10 MG/ML
60 INJECTION INTRAMUSCULAR; INTRAVENOUS ONCE
Status: DISCONTINUED | OUTPATIENT
Start: 2019-09-15 | End: 2019-09-15

## 2019-09-15 RX ADMIN — HYDROCODONE BITARTRATE AND ACETAMINOPHEN 1 TABLET: 10; 325 TABLET ORAL at 10:09

## 2019-09-15 RX ADMIN — ONDANSETRON 4 MG: 2 INJECTION INTRAMUSCULAR; INTRAVENOUS at 04:09

## 2019-09-15 RX ADMIN — TIOTROPIUM BROMIDE 18 MCG: 18 CAPSULE ORAL; RESPIRATORY (INHALATION) at 07:09

## 2019-09-15 RX ADMIN — ONDANSETRON 4 MG: 2 INJECTION INTRAMUSCULAR; INTRAVENOUS at 10:09

## 2019-09-15 RX ADMIN — IPRATROPIUM BROMIDE AND ALBUTEROL SULFATE 3 ML: .5; 3 SOLUTION RESPIRATORY (INHALATION) at 01:09

## 2019-09-15 RX ADMIN — ONDANSETRON 4 MG: 4 TABLET, ORALLY DISINTEGRATING ORAL at 10:09

## 2019-09-15 RX ADMIN — FUROSEMIDE 60 MG: 10 INJECTION, SOLUTION INTRAMUSCULAR; INTRAVENOUS at 10:09

## 2019-09-15 RX ADMIN — FLUTICASONE PROPIONATE 100 MCG: 50 SPRAY, METERED NASAL at 02:09

## 2019-09-15 RX ADMIN — HEPARIN SODIUM 5000 UNITS: 5000 INJECTION, SOLUTION INTRAVENOUS; SUBCUTANEOUS at 09:09

## 2019-09-15 RX ADMIN — HYDROCODONE BITARTRATE AND ACETAMINOPHEN 1 TABLET: 10; 325 TABLET ORAL at 06:09

## 2019-09-15 RX ADMIN — HEPARIN SODIUM 5000 UNITS: 5000 INJECTION, SOLUTION INTRAVENOUS; SUBCUTANEOUS at 02:09

## 2019-09-15 RX ADMIN — IPRATROPIUM BROMIDE AND ALBUTEROL SULFATE 3 ML: .5; 3 SOLUTION RESPIRATORY (INHALATION) at 07:09

## 2019-09-15 RX ADMIN — IPRATROPIUM BROMIDE AND ALBUTEROL SULFATE 3 ML: .5; 3 SOLUTION RESPIRATORY (INHALATION) at 09:09

## 2019-09-15 RX ADMIN — ONDANSETRON 4 MG: 2 INJECTION INTRAMUSCULAR; INTRAVENOUS at 06:09

## 2019-09-15 RX ADMIN — HYDROCODONE BITARTRATE AND ACETAMINOPHEN 1 TABLET: 10; 325 TABLET ORAL at 02:09

## 2019-09-15 RX ADMIN — HYDROCODONE BITARTRATE AND ACETAMINOPHEN 1 TABLET: 10; 325 TABLET ORAL at 04:09

## 2019-09-15 NOTE — ASSESSMENT & PLAN NOTE
Shortness of breath     -Pulmonary consulted, we appreciate pulm  -Originally planning for thoracentesis, but there is also a concern for PE.   -VQ scan showed intermittent probability---bilateral lower ext u/s is negative  -started Lovenox 1 mg/kg but now after imaging we have stopped the Lovenox 1 mg/kg and started Lovenox 40 mg daily  -Pulm ordered Lasix 80 mg IV once on yesterday and she responded well. Will give a dose of Lasix 40 mg IV once today.   -ordered supplemental O2  -nebs prn ordered

## 2019-09-15 NOTE — PROGRESS NOTES
Patient seen and examined by me. I agree with the trainee's separate note except as modified.     No major events reported overnight.     I/O -675cc. Afebrile, [id number], 94% 2L/min    LE Dopplers: negative for DVT    VQ scan (reviewed by me): triple matched defect in the LLL, likely due to pleural effusion.     On my exam: still with crackles, improved work of breathing     Impression: since pleural effusion is a known problem (not new and associated with potential VTE), triple matched defect on V/Q clinically is low probability. Therefore, unlikely to have PE. More likely is pulmonary edema associated with HFpEF. Improved symptomatically with lasix, but creatinine must be closely monitored.     Recommendations:   -another dose of IV lasix today  -prophylactic AC for now. Will have to clarify situation from March 2018 PE to determine if AC is still indicated.   -close monitoring of renal function

## 2019-09-15 NOTE — ASSESSMENT & PLAN NOTE
Chronic pain  Primary osteoarthritis of both knees  Lumbar postlaminectomy syndrome  Cervical post-laminectomy syndrome    -no issues noted at this time  -resume home med [place] 10 q 4 hours prn  -monitor           Neurosurgical Indications for Screening Dopplers on Admission:       1) Known hypercoagulation disorder (h/o VTE, thrombophilia, HIT, etc.)   2) Admitted from prolonged stay from another institution (straight forward ER transfers not included)  3) Presenting with significant leg immobility   4) Presenting with signs and symptoms of VTE?    5) With significant critical illness, Including "found down" for unknown period of time in HPI  6) With significant neurotrauma (TBI, SCI / TLS spine fractures)   7) Who are comatose   8) With known malignancy (e.g. glioblastoma multiforme, meningioma, etc.). Excludes IA chemo 23hr observation stays  9) On hemodialysis   10) Who have received platelet transfusion or antithrombotic reversal agents recently   11) Who have had recent major orthopedic surgery          Screening dopplers indicated?   Y _   N x_    DVT Prophylaxis:  x_ SCD's   _ chemoprophylaxis

## 2019-09-15 NOTE — SUBJECTIVE & OBJECTIVE
Interval History: in bed AAOx4, breathing easier. The patient states she did not sleep well because of getting up and down using the bedside commode. She does report feeling better.     Review of Systems   Constitutional: Negative for activity change, appetite change, fatigue and fever.   HENT: Negative for trouble swallowing.    Eyes: Negative for visual disturbance.   Respiratory: Positive for shortness of breath (still SOB but better). Negative for cough and wheezing.    Cardiovascular: Negative for chest pain, palpitations and leg swelling.   Gastrointestinal: Negative for abdominal distention, abdominal pain, anal bleeding, blood in stool, diarrhea, nausea and vomiting.   Genitourinary: Negative for difficulty urinating and hematuria.   Musculoskeletal: Positive for back pain and gait problem. Negative for arthralgias and myalgias.   Skin: Negative for color change, pallor, rash and wound.   Neurological: Positive for weakness. Negative for dizziness, seizures, syncope, speech difficulty, light-headedness, numbness and headaches.   Hematological: Does not bruise/bleed easily.   Psychiatric/Behavioral: Negative for agitation, confusion and hallucinations. The patient is not nervous/anxious.      Objective:     Vital Signs (Most Recent):  Temp: 96.2 °F (35.7 °C) (09/15/19 0818)  Pulse: 109 (09/15/19 0818)  Resp: 20 (09/15/19 0818)  BP: 130/74 (09/15/19 0818)  SpO2: (!) 94 % (09/15/19 0818) Vital Signs (24h Range):  Temp:  [96.2 °F (35.7 °C)-98.1 °F (36.7 °C)] 96.2 °F (35.7 °C)  Pulse:  [] 109  Resp:  [14-23] 20  SpO2:  [91 %-96 %] 94 %  BP: (122-134)/(61-81) 130/74     Weight: 61.2 kg (134 lb 14.7 oz)  Body mass index is 24.68 kg/m².    Intake/Output Summary (Last 24 hours) at 9/15/2019 0921  Last data filed at 9/15/2019 0704  Gross per 24 hour   Intake 725 ml   Output 2050 ml   Net -1325 ml      Physical Exam   Constitutional: She is oriented to person, place, and time. She appears well-developed and  well-nourished. No distress.   HENT:   Head: Normocephalic and atraumatic.   Eyes: Pupils are equal, round, and reactive to light.   Neck: Normal range of motion. No JVD present.   Cardiovascular: Normal rate, regular rhythm, normal heart sounds and intact distal pulses.   No murmur heard.  Pulmonary/Chest: Effort normal. No respiratory distress. She has no wheezes.   Abdominal: Soft. Bowel sounds are normal. She exhibits no distension. There is no tenderness. There is no guarding.   Musculoskeletal: Normal range of motion.   Neurological: She is alert and oriented to person, place, and time.   Skin: Skin is warm and dry. Capillary refill takes less than 2 seconds. She is not diaphoretic.   Psychiatric: She has a normal mood and affect. Her behavior is normal. Judgment and thought content normal.   Nursing note and vitals reviewed.      Significant Labs:   CBC:   Recent Labs   Lab 09/13/19  1145 09/14/19  0549   WBC 7.48 6.22   HGB 10.3* 9.9*   HCT 32.5* 31.7*   * 312     CMP:   Recent Labs   Lab 09/13/19  1145 09/14/19  0549    135*   K 5.0 4.8    102   CO2 21* 23   GLU 90 70   BUN 24* 23   CREATININE 2.3* 2.2*   CALCIUM 10.0 9.7   PROT 8.0  --    ALBUMIN 4.3  --    BILITOT 0.3  --    ALKPHOS 102  --    AST 23  --    ALT 8*  --    ANIONGAP 14 10   EGFRNONAA 20* 21*     Troponin:   Recent Labs   Lab 09/13/19  1145   TROPONINI 0.007     Urine Culture: No results for input(s): LABURIN in the last 48 hours.  Urine Studies:   Recent Labs   Lab 09/14/19  0000   COLORU Yellow   APPEARANCEUA Clear   PHUR 6.0   SPECGRAV 1.020   PROTEINUA Trace*   GLUCUA Negative   KETONESU Negative   BILIRUBINUA Negative   OCCULTUA Trace*   NITRITE Negative   UROBILINOGEN Negative   LEUKOCYTESUR Negative       Significant Imaging: I have reviewed all pertinent imaging results/findings within the past 24 hours.

## 2019-09-15 NOTE — ASSESSMENT & PLAN NOTE
Chronic respiratory failure with hypoxia, on home O2 therapy    -supplemental O2 ordered  -cont Spiriva daily   -prn nebs

## 2019-09-15 NOTE — PROGRESS NOTES
[facility] Hospital Medicine  Progress Note    Patient Name: Katie Quevedo  MRN: [id number]  Patient Class: OP- Observation   Admission Date: 9/13/2019  Length of Stay: 0 days  Attending Physician: Jennifer Bowles*  Primary Care Provider: Kingston Verduzco MD        Subjective:     Principal Problem:Pleural effusion        HPI:  Ms. Katie Quevedo is a 75 y/o female who lives in [place], [place] at her residence with her . The patients PCP is Dr. Kingston Verduzco. She is also followed by Dr. Clemencia Gambino, Dr. Aleta Joseph with Pulmonology, Dr. Aura Diggs with Nephrology, and  Dr. Geovanni Interiano with Pulmonology. The patient has a past medical history of  COPD, Encounter for blood transfusion, HTN, lumbar spondylosis, melanoma---of the lip, primary osteoarthritis of both knees---s/p Rt TKA, seizures, anemia, bilateral renal cysts, cataract, chronic LBP--7/26/2012, chronic pain, CKD----stage V, dehydration, metabolic bone disease, migraines---neuralgic, osteoporosis, pulmonary embolism with infarction, subdeltoid bursitis, ulcer, and vitamin D deficiency disease. The patient has a past surgical history of bladder suspension, left eye cataract sx, cervical laminectomy x3, spinal fusion x1, colonoscopy, hysterectomy, oophorectomy, and TKR.     The patient presents to the ED due to shortness of breath for the past 4 days. The patient was seen about a month ago due to worsening shortness of breath. She was diagnosed with a left pleural effusion and had  thoracentesis done. She reports she felt better after this and followed up with her pulmonologist. She has yet to have VATS and pleural biopsy done. Patient denies having fluid drained from her lungs since being admitted to the hospital. Patient is on 2 L of supplemental O2 at home. She has tried breathing treatment at home with minimal relief. She denies chest pain, palpitations,  abdominal pain, back pain, cough, vomiting, or  any other complaints at this time. She does report nausea for a while, but denies vomiting. She reports a poor appetite as well; patient states she has been unable to take any medications since the SOB has worsened.     The ED workup shows---stable hemoglobin, CO2--21, BUN--24, Cr---2.3, ALT--8, BNP---586, elevated troponin. CXR---Moderate-sized left pleural effusion similar to prior. She will be admitted to the Hospital Medicine Service for further treatment.        /    Overview/Hospital Course:  She was admitted to the hospital medicine service for further treatment. Pulm was consulted---originally planned a bedside thoracentesis, then reassessed to r/o PE. Lovenox 1mg/kg has been started. VQ scan---intermittent probability, bilateral lower ext u/s negative. Will given another dose of Lasix today 40 mg IV once. Will liekly start PO lasix daily. Will need to follow up with Pulm outpatient. Will d/c Lovenox 1mg/kg at this time. Will treat with prophylactic Lovenox for now.     Interval History: in bed AAOx4, breathing easier. The patient states she did not sleep well because of getting up and down using the bedside commode. She does report feeling better.     Review of Systems   Constitutional: Negative for activity change, appetite change, fatigue and fever.   HENT: Negative for trouble swallowing.    Eyes: Negative for visual disturbance.   Respiratory: Positive for shortness of breath (still SOB but better). Negative for cough and wheezing.    Cardiovascular: Negative for chest pain, palpitations and leg swelling.   Gastrointestinal: Negative for abdominal distention, abdominal pain, anal bleeding, blood in stool, diarrhea, nausea and vomiting.   Genitourinary: Negative for difficulty urinating and hematuria.   Musculoskeletal: Positive for back pain and gait problem. Negative for arthralgias and myalgias.   Skin: Negative for color change, pallor, rash and wound.   Neurological: Positive for weakness. Negative  for dizziness, seizures, syncope, speech difficulty, light-headedness, numbness and headaches.   Hematological: Does not bruise/bleed easily.   Psychiatric/Behavioral: Negative for agitation, confusion and hallucinations. The patient is not nervous/anxious.      Objective:     Vital Signs (Most Recent):  Temp: 96.2 °F (35.7 °C) (09/15/19 0818)  Pulse: 109 (09/15/19 0818)  Resp: 20 (09/15/19 0818)  BP: 130/74 (09/15/19 0818)  SpO2: (!) 94 % (09/15/19 0818) Vital Signs (24h Range):  Temp:  [96.2 °F (35.7 °C)-98.1 °F (36.7 °C)] 96.2 °F (35.7 °C)  Pulse:  [] 109  Resp:  [14-23] 20  SpO2:  [91 %-96 %] 94 %  BP: (122-134)/(61-81) 130/74     Weight: 61.2 kg (134 lb 14.7 oz)  Body mass index is 24.68 kg/m².    Intake/Output Summary (Last 24 hours) at 9/15/2019 0921  Last data filed at 9/15/2019 0704  Gross per 24 hour   Intake 725 ml   Output 2050 ml   Net -1325 ml      Physical Exam   Constitutional: She is oriented to person, place, and time. She appears well-developed and well-nourished. No distress.   HENT:   Head: Normocephalic and atraumatic.   Eyes: Pupils are equal, round, and reactive to light.   Neck: Normal range of motion. No JVD present.   Cardiovascular: Normal rate, regular rhythm, normal heart sounds and intact distal pulses.   No murmur heard.  Pulmonary/Chest: Effort normal. No respiratory distress. She has no wheezes.   Abdominal: Soft. Bowel sounds are normal. She exhibits no distension. There is no tenderness. There is no guarding.   Musculoskeletal: Normal range of motion.   Neurological: She is alert and oriented to person, place, and time.   Skin: Skin is warm and dry. Capillary refill takes less than 2 seconds. She is not diaphoretic.   Psychiatric: She has a normal mood and affect. Her behavior is normal. Judgment and thought content normal.   Nursing note and vitals reviewed.      Significant Labs:   CBC:   Recent Labs   Lab 09/13/19  1145 09/14/19  0549   WBC 7.48 6.22   HGB 10.3* 9.9*    HCT 32.5* 31.7*   * 312     CMP:   Recent Labs   Lab 09/13/19  1145 09/14/19  0549    135*   K 5.0 4.8    102   CO2 21* 23   GLU 90 70   BUN 24* 23   CREATININE 2.3* 2.2*   CALCIUM 10.0 9.7   PROT 8.0  --    ALBUMIN 4.3  --    BILITOT 0.3  --    ALKPHOS 102  --    AST 23  --    ALT 8*  --    ANIONGAP 14 10   EGFRNONAA 20* 21*     Troponin:   Recent Labs   Lab 09/13/19  1145   TROPONINI 0.007     Urine Culture: No results for input(s): LABURIN in the last 48 hours.  Urine Studies:   Recent Labs   Lab 09/14/19  0000   COLORU Yellow   APPEARANCEUA Clear   PHUR 6.0   SPECGRAV 1.020   PROTEINUA Trace*   GLUCUA Negative   KETONESU Negative   BILIRUBINUA Negative   OCCULTUA Trace*   NITRITE Negative   UROBILINOGEN Negative   LEUKOCYTESUR Negative       Significant Imaging: I have reviewed all pertinent imaging results/findings within the past 24 hours.      Assessment/Plan:      * Pleural effusion  Shortness of breath     -Pulmonary consulted, we appreciate pulm  -Originally planning for thoracentesis, but there is also a concern for PE.   -VQ scan showed intermittent probability---bilateral lower ext u/s is negative  -started Lovenox 1 mg/kg but now after imaging we have stopped the Lovenox 1 mg/kg and started Heparin SQ for VTE  -Pulm ordered Lasix 80 mg IV once on yesterday and she responded well. Pulm ordered a dose of Lasix 60 mg IV once today.   -ordered supplemental O2  -nebs prn ordered      Acute kidney injury superimposed on chronic kidney disease stage IV  Cr---2.3>2.2>2.5; has been 1.4-2.0 over the past year.  -checked renal u/s--  1. No evidence of hydronephrosis.  2. Small and echogenic left kidney with decreased perfusion consistent with chronic medical renal disease.  3. Mild elevated renal resistive indices.  4. Simple bilateral renal cysts.  -checked urine studies  -avoid nephrotoxic agents  -renally dose meds  -monitor bmp      Essential hypertension  Will monitor for now, b/p looks  good.       Iron deficiency anemia  Stable at this time, follow.     COPD (chronic obstructive pulmonary disease) with chronic bronchitis  Chronic respiratory failure with hypoxia, on home O2 therapy    -supplemental O2 ordered  -cont Spiriva daily   -prn nebs    Chronic neck pain  Chronic pain  Primary osteoarthritis of both knees  Lumbar postlaminectomy syndrome  Cervical post-laminectomy syndrome    -no issues noted at this time  -resume home med [place] 10 q 4 hours prn  -monitor              VTE Risk Mitigation (From admission, onward)        Ordered     enoxaparin injection 40 mg  Daily      09/15/19 0921     Place sequential compression device  Until discontinued      09/13/19 1432     IP VTE HIGH RISK PATIENT  Once      09/13/19 1432                Jere Mercado NP  Department of Hospital Medicine   [facility]

## 2019-09-15 NOTE — PROGRESS NOTES
Pulmonary & Critical Care Medicine Progress Note    Subjective:   No overnight events. Patient's breathing is slightly better this morning after significant diuresis with Lasix 80 mg IV x1 last night. LE U/S (-) for DVT. V/Q showed intermediate probability.    Vital Signs:   Vitals:    09/15/19 0818   BP: 130/74   Pulse: 109   Resp: 20   Temp: 96.2 °F (35.7 °C)     Fluid Balance:     Intake/Output Summary (Last 24 hours) at 9/15/2019 1008  Last data filed at 9/15/2019 0704  Gross per 24 hour   Intake 725 ml   Output 2050 ml   Net -1325 ml     Physical Exam:   General: Sitting up comfortably, NAD  HEENT: NC/AT, PERRL, EOMI, no scleral icterus, moist mucous membranes  Neck: Supple without JVD, trachea midline  Cardiac: S1S2 auscultated, RRR, no murmurs  Resp: Good inspiratory effort. Bilateral crackles present. No increased work of breathing.   Abd: Soft, NT/ND, +BS, no HSM  Ext: No edema, no cyanosis, 2+ pulses present  Skin: Warm and dry, no rashes, no lesions, no jaundice  Neuro: AAOx3, CN II-XII grossly intact, no focal deficits    Laboratory Studies:   No results for input(s): PH, PCO2, PO2, HCO3, POCSATURATED, BE in the last 24 hours.  Recent Labs   Lab 09/15/19  0933   WBC 7.25   RBC 3.83*   HGB 10.3*   HCT 33.4*      MCV 87   MCH 26.9*   MCHC 30.8*     Recent Labs   Lab 09/15/19  0933   *   K 4.7   CL 96   CO2 22*   BUN 25*   CREATININE 2.5*     Microbiology Data:   Microbiology Results (last 7 days)     ** No results found for the last 168 hours. **         Chest Imaging:   No new imaging.     Scheduled Medications:    enoxaparin  40 mg Subcutaneous Daily    furosemide  40 mg Intravenous Once    tiotropium  1 capsule Inhalation Daily    traZODone  50 mg Oral QHS     PRN Medications:   albuterol-ipratropium, HYDROcodone-acetaminophen, influenza, ondansetron, ondansetron, sodium chloride 0.9%    Assessment & Plan:   Patient Active Problem List   Diagnosis    Melanoma    Chronic pain     Vitamin deficiency    Cervical post-laminectomy syndrome    Lumbar postlaminectomy syndrome    Chronic neck pain    Lumbosacral spondylosis without myelopathy    Isolated cervical dystonia    Primary osteoarthritis of both knees    Subdeltoid bursitis, L>R.    Other fragments of torsion dystonia    Nuclear sclerosis - Both Eyes    Rotator cuff tear, right    Biceps tendonitis    COPD (chronic obstructive pulmonary disease) with chronic bronchitis    Osteoarthritis, hip, bilateral    Lumbar radiculopathy, BLE    Cervical radiculopathy, BUE    Osteoporosis    Chronic low back pain    Iron deficiency anemia    Essential hypertension    Atrophy of left kidney    Kidney cysts    History of colon polyps    Medical non-compliance    Pleural effusion    Pericardial effusion    Chronic respiratory failure with hypoxia, on home O2 therapy    Acute kidney injury superimposed on chronic kidney disease stage IV    Anemia, chronic renal failure, stage 4 (severe)    Lung mass    Shortness of breath     Ms. Quevedo is a 75 yo F with a PMH of HTN, COPD, and CKD who presents to [facility] on 9/13 for acute worsening of SOB over the course of a few days. CXR upon arrival showed small-moderate L-sided pleural effusion.     Upon looking back through the chart, patient has had recurrent pleural effusions. Two thoracenteses have been done, one on 6/20 and the other on 7/17. The first thoracentesis showed 90% lymphocytes. The second showed 66% lymphocytes. Patient has been referred to Dr. Perdue for VATs with pleural biopsy and pleurodesis, however, is still pending cardiology clearance.     1. Acute dyspnea  2. Dilated IVC on bedside U/S  3. Septal motion abnormality on bedside U/S  4. Recurrent L-sided pleural effusion  5. Hypoxic Respiratory Failure  6. YOSVANY on CKD     Pulmonary team performed bedside cardiac and lung U/S on 9/14. Cardiac U/S showed septal flattening/bowing and very dilated IVC. B-lines noted  throughout both lungs. L-sided effusion is small and without septations.     1. Acute dyspnea  -After significant improvement with Lasix 80 mg IV x1 dose yesterday evening, I suspect that patient's SOB is due to fluid overload. She likely has HFpEF and has been accumulating fluid over the past week with acute worsening.  -On admission, patient had bilateral crackles, b-lines on U/S, very plump IVC without any collapsibility on U/S, and septal motion abnormality on U/S. BNP was 586, the highest it has ever been.  -2D ECHO ordered and pending.    2. Concern for PE  -Patient had a high probability V/Q scan back on 3/5/18. At that time she was placed on AC and continued taking AC up until a few months ago when she was asked to stop by her doctor.  -Repeat V/Q scan shows intermediate probability however, this is more likely due to her LLL pleural effusion. I do not see any obvious perfusion deficits like is noted on her old V/Q scan.  -Lower extremity U/S (-) for DVT.  -Do NOT recommend therapeutic AC at this time.     3. L-sided recurrent pleural effusion  -Unclear etiology at this time. Lymphocytic predominant in the past. Cytology has been (-) in the past.   -Pleural biopsy and pleurodesis pending with Dr. Perdue. Patient should have follow-up appointment scheduled with him at discharge.   -Additionally, if patient cannot obtain inpatient cardiology clearance, please ensure that patient has outpatient cardiology appointment for clearance prior to appointment with Dr. Perdue.  -No thoracentesis at this time.     4. COPD  -PFT 8/31 shows moderate-severe COPD (FEV1 0.98L 52%) with airtrapping and DLCO reduction (55.5%).  -Daily dosing with Spiriva started in the hospital.  -Should be discharged with Spiriva inhaler and close pulmonary follow up.     DVT ppx: Heparin 5000U q8h  Code status: Full code     Thank you for involving us in the care of this patient. We will continue to follow along. Please call with any  questions.    Roxana Matta MD  [facility] Fellow, PGY 6  [facility]  Pager: [phone]

## 2019-09-15 NOTE — PLAN OF CARE
Problem: Adult Inpatient Plan of Care  Goal: Patient-Specific Goal (Individualization)  Patient on oxygen with documented flow.  Will attempt to wean per O2 order protocol.

## 2019-09-15 NOTE — ASSESSMENT & PLAN NOTE
Cr---2.3>2.2; has been 1.4-2.0 over the past year.  -checked renal u/s--  1. No evidence of hydronephrosis.  2. Small and echogenic left kidney with decreased perfusion consistent with chronic medical renal disease.  3. Mild elevated renal resistive indices.  4. Simple bilateral renal cysts.  -checked urine studies  -avoid nephrotoxic agents  -renally dose meds  -monitor bmp

## 2019-09-15 NOTE — PLAN OF CARE
Problem: Adult Inpatient Plan of Care  Goal: Plan of Care Review  Outcome: Ongoing (interventions implemented as appropriate)     09/15/19 0576   Plan of Care Review   Plan of Care Reviewed With patient   POC reviewed with the pt, verbalized understanding.  AAOx3, VSS.  Complaint of pain on back, prn norco given and heating pads applied.  No other distress or any other complaint of pain throughout night.  Tachypnea/ dyspnea noted with activity.  On 2L oxygen via NC, prn breathing treatment received.  On continuous telemetry monitor, SR to ST with HR in 90s-110s.  No ectopy noted.  US on legs, VQ scan done; results in chart.  Echo needs to be done in am.  Safety maintained, free of falls throughout shift.  Instructed to call for any assistance.  Will continue to monitor.

## 2019-09-16 VITALS
DIASTOLIC BLOOD PRESSURE: 65 MMHG | HEIGHT: 62 IN | SYSTOLIC BLOOD PRESSURE: 113 MMHG | HEART RATE: 83 BPM | WEIGHT: 128.31 LBS | OXYGEN SATURATION: 94 % | RESPIRATION RATE: 20 BRPM | TEMPERATURE: 96 F | BODY MASS INDEX: 23.61 KG/M2

## 2019-09-16 PROBLEM — R06.02 SHORTNESS OF BREATH: Status: RESOLVED | Noted: 2019-09-13 | Resolved: 2019-09-16

## 2019-09-16 LAB
AORTIC ROOT ANNULUS: 2.19 CM
AORTIC VALVE CUSP SEPERATION: 1.67 CM
AV INDEX (PROSTH): 1.09
AV MEAN GRADIENT: 5 MMHG
AV PEAK GRADIENT: 9 MMHG
AV VELOCITY RATIO: 0.92
BSA FOR ECHO PROCEDURE: 1.64 M2
CV ECHO LV RWT: 0.35 CM
DOP CALC AO PEAK VEL: 1.52 M/S
DOP CALC AO VTI: 27.98 CM
DOP CALC LVOT PEAK VEL: 1.4 M/S
DOP CALCLVOT PEAK VEL VTI: 30.41 CM
E WAVE DECELERATION TIME: 112.59 MSEC
E/A RATIO: 2.21
ECHO LV POSTERIOR WALL: 0.61 CM (ref 0.6–1.1)
FRACTIONAL SHORTENING: 34 % (ref 28–44)
INTERVENTRICULAR SEPTUM: 0.42 CM (ref 0.6–1.1)
IVRT: 0.07 MSEC
LA MAJOR: 4.35 CM
LA MINOR: 4.94 CM
LA WIDTH: 2.3 CM
LEFT ATRIUM SIZE: 4.04 CM
LEFT ATRIUM VOLUME INDEX: 23.1 ML/M2
LEFT ATRIUM VOLUME: 36.54 CM3
LEFT INTERNAL DIMENSION IN SYSTOLE: 2.28 CM (ref 2.1–4)
LEFT VENTRICLE DIASTOLIC VOLUME INDEX: 31.68 ML/M2
LEFT VENTRICLE DIASTOLIC VOLUME: 50.14 ML
LEFT VENTRICLE MASS INDEX: 26 G/M2
LEFT VENTRICLE SYSTOLIC VOLUME INDEX: 11.2 ML/M2
LEFT VENTRICLE SYSTOLIC VOLUME: 17.67 ML
LEFT VENTRICULAR INTERNAL DIMENSION IN DIASTOLE: 3.48 CM (ref 3.5–6)
LEFT VENTRICULAR MASS: 41.86 G
MV PEAK A VEL: 0.53 M/S
MV PEAK E VEL: 1.17 M/S
PV PEAK VELOCITY: 0.96 CM/S
RA MAJOR: 3.75 CM
RA PRESSURE: 8 MMHG

## 2019-09-16 PROCEDURE — 96372 THER/PROPH/DIAG INJ SC/IM: CPT | Mod: 59 | Performed by: EMERGENCY MEDICINE

## 2019-09-16 PROCEDURE — 96376 TX/PRO/DX INJ SAME DRUG ADON: CPT | Performed by: EMERGENCY MEDICINE

## 2019-09-16 PROCEDURE — 94640 AIRWAY INHALATION TREATMENT: CPT | Mod: HCNC

## 2019-09-16 PROCEDURE — 63600175 PHARM REV CODE 636 W HCPCS: Mod: HCNC | Performed by: STUDENT IN AN ORGANIZED HEALTH CARE EDUCATION/TRAINING PROGRAM

## 2019-09-16 PROCEDURE — 25000003 PHARM REV CODE 250: Mod: HCNC | Performed by: NURSE PRACTITIONER

## 2019-09-16 PROCEDURE — 63600175 PHARM REV CODE 636 W HCPCS: Mod: HCNC | Performed by: NURSE PRACTITIONER

## 2019-09-16 PROCEDURE — 94761 N-INVAS EAR/PLS OXIMETRY MLT: CPT | Mod: HCNC

## 2019-09-16 PROCEDURE — G0378 HOSPITAL OBSERVATION PER HR: HCPCS | Mod: HCNC

## 2019-09-16 PROCEDURE — 27000221 HC OXYGEN, UP TO 24 HOURS: Mod: HCNC

## 2019-09-16 RX ORDER — FUROSEMIDE 20 MG/1
20 TABLET ORAL 2 TIMES DAILY
Qty: 60 TABLET | Refills: 11 | Status: SHIPPED | OUTPATIENT
Start: 2019-09-19 | End: 2020-01-14 | Stop reason: SDUPTHER

## 2019-09-16 RX ORDER — ONDANSETRON 4 MG/1
4 TABLET, ORALLY DISINTEGRATING ORAL EVERY 6 HOURS PRN
Qty: 30 TABLET | Refills: 3 | Status: SHIPPED | OUTPATIENT
Start: 2019-09-16 | End: 2022-02-08

## 2019-09-16 RX ADMIN — ONDANSETRON 4 MG: 4 TABLET, ORALLY DISINTEGRATING ORAL at 08:09

## 2019-09-16 RX ADMIN — HYDROCODONE BITARTRATE AND ACETAMINOPHEN 1 TABLET: 10; 325 TABLET ORAL at 08:09

## 2019-09-16 RX ADMIN — HYDROCODONE BITARTRATE AND ACETAMINOPHEN 1 TABLET: 10; 325 TABLET ORAL at 03:09

## 2019-09-16 RX ADMIN — HEPARIN SODIUM 5000 UNITS: 5000 INJECTION, SOLUTION INTRAVENOUS; SUBCUTANEOUS at 05:09

## 2019-09-16 RX ADMIN — HYDROCODONE BITARTRATE AND ACETAMINOPHEN 1 TABLET: 10; 325 TABLET ORAL at 01:09

## 2019-09-16 RX ADMIN — FLUTICASONE PROPIONATE 100 MCG: 50 SPRAY, METERED NASAL at 08:09

## 2019-09-16 RX ADMIN — ONDANSETRON 4 MG: 2 INJECTION INTRAMUSCULAR; INTRAVENOUS at 04:09

## 2019-09-16 RX ADMIN — TIOTROPIUM BROMIDE 18 MCG: 18 CAPSULE ORAL; RESPIRATORY (INHALATION) at 08:09

## 2019-09-16 NOTE — DISCHARGE INSTRUCTIONS
You have a prescription. to start Lasix 20 mg by mouth daily. You can start taking this medivation on 9/19/2019.    Please follow up with your PCP in 1 week.

## 2019-09-16 NOTE — PLAN OF CARE
Problem: Adult Inpatient Plan of Care  Goal: Plan of Care Review  Outcome: Ongoing (interventions implemented as appropriate)  Plan of care reviewed with patient, understanding verbalized.  Pt remains SR on tele. PRN meds given for pain and nausea. Bed alarm on, call light in reach, fall precautions in place.

## 2019-09-16 NOTE — PLAN OF CARE
Visit with pt and spouse, pt will d/c home today.  Home O2 in room for d/c.    D/C rounds complete. All questions answered.  Nurse to discuss d/c medications.  Discussed need to keep f/u appts, adherence to medication regimen for health maintenance, verbalized understanding.    Future Appointments   Date Time Provider Department Center   9/26/2019  3:00 PM Cleemncia Gambino MD [facility]   10/28/2019 10:30 AM Aura Diggs MD [facility]   11/4/2019  4:00 PM Pushpa Mcmahon MD [facility]       Pulmonary f/u Dr Davila 9/24 in AVS.    Charmaine Tom, RN    [phone]

## 2019-09-16 NOTE — ASSESSMENT & PLAN NOTE
Shortness of breath ---resolved    -Pulmonary consulted, we appreciate pulm  -Originally planning for thoracentesis, but there is also a concern for PE.   -VQ scan showed intermittent probability---bilateral lower ext u/s is negative  -started Lovenox 1 mg/kg but now after imaging we have stopped the Lovenox 1 mg/kg and started Lovenox 40 mg daily  -Pulm ordered Lasix 80 mg IV once on yesterday and she responded well. Will give a dose of Lasix 40 mg IV once today.  -will start the patient on Lasix 20 mg PO daily---instructed the patient to start this medication on 9/19/2019   -ordered supplemental O2  -nebs prn ordered  -she will need to follow up with Pulm outpatient and she will need to follow up with her PCP

## 2019-09-16 NOTE — PLAN OF CARE
Problem: Adult Inpatient Plan of Care  Goal: Plan of Care Review  Outcome: Ongoing (interventions implemented as appropriate)  Chart review completed.

## 2019-09-16 NOTE — PLAN OF CARE
Problem: Adult Inpatient Plan of Care  Goal: Plan of Care Review  Outcome: Ongoing (interventions implemented as appropriate)  Patient on oxygen with documented flow. Will attempt to wean per protocol.  Will continue to monitor.

## 2019-09-16 NOTE — PROGRESS NOTES
Pulmonary & Critical Care Medicine Progress Note    Subjective:   No overnight events. Patient's breathing is slightly better this morning after significant diuresis with Lasix 80 mg IV x1 last night. LE U/S (-) for DVT. V/Q showed intermediate probability.    Vital Signs:   Vitals:    09/16/19 0823   BP:    Pulse: 89   Resp: 20   Temp:      Fluid Balance:     Intake/Output Summary (Last 24 hours) at 9/16/2019 1124  Last data filed at 9/16/2019 0600  Gross per 24 hour   Intake --   Output 1500 ml   Net -1500 ml     Physical Exam:   General: Sitting up comfortably, NAD  HEENT: NC/AT, PERRL, EOMI, no scleral icterus, moist mucous membranes  Neck: Supple without JVD, trachea midline  Cardiac: S1S2 auscultated, RRR, no murmurs  Resp: Good inspiratory effort. Bilateral crackles present. No increased work of breathing.   Abd: Soft, NT/ND, +BS, no HSM  Ext: No edema, no cyanosis, 2+ pulses present  Skin: Warm and dry, no rashes, no lesions, no jaundice  Neuro: AAOx3, CN II-XII grossly intact, no focal deficits    Laboratory Studies:   No results for input(s): PH, PCO2, PO2, HCO3, POCSATURATED, BE in the last 24 hours.  No results for input(s): WBC, RBC, HGB, HCT, PLT, MCV, MCH, MCHC in the last 24 hours.  No results for input(s): GLUCOSE, NA, K, CL, CO2, BUN, CREATININE, MG in the last 24 hours.    Invalid input(s):  CALCIUM  Microbiology Data:   Microbiology Results (last 7 days)     ** No results found for the last 168 hours. **         Chest Imaging:   No new imaging.     Scheduled Medications:    fluticasone propionate  2 spray Each Nostril Daily    heparin (porcine)  5,000 Units Subcutaneous Q8H    tiotropium  1 capsule Inhalation Daily    traZODone  50 mg Oral QHS     PRN Medications:   albuterol-ipratropium, HYDROcodone-acetaminophen, influenza, ondansetron, ondansetron, sodium chloride 0.9%    Assessment & Plan:   Patient Active Problem List   Diagnosis    Melanoma    Chronic pain    Vitamin deficiency     Cervical post-laminectomy syndrome    Lumbar postlaminectomy syndrome    Chronic neck pain    Lumbosacral spondylosis without myelopathy    Isolated cervical dystonia    Primary osteoarthritis of both knees    Subdeltoid bursitis, L>R.    Other fragments of torsion dystonia    Nuclear sclerosis - Both Eyes    Rotator cuff tear, right    Biceps tendonitis    COPD (chronic obstructive pulmonary disease) with chronic bronchitis    Osteoarthritis, hip, bilateral    Lumbar radiculopathy, BLE    Cervical radiculopathy, BUE    Osteoporosis    Chronic low back pain    Iron deficiency anemia    Essential hypertension    Atrophy of left kidney    Kidney cysts    History of colon polyps    Medical non-compliance    Pleural effusion    Pericardial effusion    Chronic respiratory failure with hypoxia, on home O2 therapy    Acute kidney injury superimposed on chronic kidney disease stage IV    Anemia, chronic renal failure, stage 4 (severe)    Lung mass     Ms. Quevedo is a 73 yo F with a PMH of HTN, COPD, and CKD who presents to [facility] on 9/13 for acute worsening of SOB over the course of a few days. CXR upon arrival showed small-moderate L-sided pleural effusion.     Patient has had recurrent pleural effusions. Two thoracenteses have been done, one on 6/20 and the other on 7/17. The first thoracentesis showed 90% lymphocytes. The second showed 66% lymphocytes. Patient has been referred to Dr. Perdue for VATs with pleural biopsy and pleurodesis, however, is still pending cardiology clearance.     1. Acute dyspnea  2. Dilated IVC on bedside U/S  3. Septal motion abnormality on bedside U/S  4. Recurrent L-sided pleural effusion  5. Hypoxic Respiratory Failure  6. YOSVANY on CKD     Pulmonary team performed bedside cardiac and lung U/S on 9/14. Cardiac U/S showed septal flattening/bowing and very dilated IVC. B-lines noted throughout both lungs. L-sided effusion is small and without septations.     1. Acute  dyspnea  -Significant improvement with HD  She likely has HFpEF and has been accumulating fluid over the past week with acute worsening.  -On admission, patient had bilateral crackles, b-lines on U/S, very plump IVC without any collapsibility on U/S, and septal motion abnormality on U/S. BNP was 586  -significantly improved after diuresis      2. Concern for PE  -Patient had a high probability V/Q scan back on 3/5/18. At that time she was placed on AC and continued taking AC up until a few months ago when she was asked to stop by her doctor.  -Repeat V/Q scan shows intermediate probability however, this is more likely due to her LLL pleural effusion. I do not see any obvious perfusion deficits like is noted on her old V/Q scan.  -Lower extremity U/S (-) for DVT.  -Do NOT recommend therapeutic AC at this time.     3. L-sided recurrent pleural effusion  -Unclear etiology at this time. Lymphocytic predominant in the past. Cytology has been (-) in the past.   -unsure of the etiology, but clear improvement which would argue against malignancy  -No thoracentesis at this time.  -will re-consider Cardiothoracic evaluation     4. COPD  -PFT 8/31 shows moderate-severe COPD (FEV1 0.98L 52%) with airtrapping and DLCO reduction (55.5%).  -Daily dosing with Spiriva started in the hospital.  -Should be discharged with Spiriva inhaler and close pulmonary follow up.      DVT ppx: Heparin 5000U q8h  Code status: Full code     Thank you for involving us in the care of this patient. We will continue to follow along. Please call with any questions.    Cricket Hills MD  [facility] PCCM Fellow, PGY 6    Pt seen and examined with Pulmonary/Critical Care team and this note reviewed and validated with the following additional comments:    The natural history of Ms. Quevedo's left pleural effusion does not fit that of metastatic cancer. The effusion has not been tapped in 2 months and yet it remains relatively small compared to how big it  originally was. She has a new diagnosis of diastolic heart failure which would be very unlikely to be the cause of a large unilateral left-sided exudate but stranger things have happened. Let's just treat her COPD and HFpEF and follow her as an outpt.    Evan Caban MD  Phone [phone]

## 2019-09-16 NOTE — ASSESSMENT & PLAN NOTE
Chronic pain  Primary osteoarthritis of both knees  Lumbar postlaminectomy syndrome  Cervical post-laminectomy syndrome    -no issues noted at this time  -cont home med Norco TID prn  -monitor

## 2019-09-16 NOTE — PLAN OF CARE
Problem: Adult Inpatient Plan of Care  Goal: Plan of Care Review  Outcome: Ongoing (interventions implemented as appropriate)  20 gauge IV to left A/C removed without difficulty, catheter intact- no redness or swelling noted to site at time of removal. Tele monitor removed, cleaned, and returned to telemetry bunker. D/C instructions and medications reviewed with patient and patient's spouse- verbalizes understanding. Patient to be transported home via personal vehicle by spouse to resume self care. NADN at time of D/C.

## 2019-09-16 NOTE — NURSING
VN cued into the patient's room with permission. I have reviewed the AVS in detail with the patient and her . The patient was informed about her diagnosis and when to follow up with her physician. Her medications and education were thoroughly discussed. All questions and concerns were addressed.

## 2019-09-16 NOTE — DISCHARGE SUMMARY
[facility] Hospital Medicine  Discharge Summary      Patient Name: Katie Quevedo  MRN: [id number]  Admission Date: 9/13/2019  Hospital Length of Stay: 0 days  Discharge Date and Time:  09/16/2019 9:10 AM  Attending Physician: Jennifer Bowles*   Discharging Provider: Jere Mercado NP  Primary Care Provider: Kingston Verduzco MD      HPI:   Ms. Katie Quevedo is a 73 y/o female who lives in [place], [place] at her residence with her . The patients PCP is Dr. Kingston Verduzco. She is also followed by Dr. Clemencia Gambino, Dr. Aleta Joseph with Pulmonology, Dr. Aura Diggs with Nephrology, and  Dr. Geovanni Interiano with Pulmonology. The patient has a past medical history of  COPD, Encounter for blood transfusion, HTN, lumbar spondylosis, melanoma---of the lip, primary osteoarthritis of both knees---s/p Rt TKA, seizures, anemia, bilateral renal cysts, cataract, chronic LBP--7/26/2012, chronic pain, CKD----stage V, dehydration, metabolic bone disease, migraines---neuralgic, osteoporosis, pulmonary embolism with infarction, subdeltoid bursitis, ulcer, and vitamin D deficiency disease. The patient has a past surgical history of bladder suspension, left eye cataract sx, cervical laminectomy x3, spinal fusion x1, colonoscopy, hysterectomy, oophorectomy, and TKR.     The patient presents to the ED due to shortness of breath for the past 4 days. The patient was seen about a month ago due to worsening shortness of breath. She was diagnosed with a left pleural effusion and had  thoracentesis done. She reports she felt better after this and followed up with her pulmonologist. She has yet to have VATS and pleural biopsy done. Patient denies having fluid drained from her lungs since being admitted to the hospital. Patient is on 2 L of supplemental O2 at home. She has tried breathing treatment at home with minimal relief. She denies chest pain, palpitations,  abdominal pain, back pain,  cough, vomiting, or any other complaints at this time. She does report nausea for a while, but denies vomiting. She reports a poor appetite as well; patient states she has been unable to take any medications since the SOB has worsened.     The ED workup shows---stable hemoglobin, CO2--21, BUN--24, Cr---2.3, ALT--8, BNP---586, elevated troponin. CXR---Moderate-sized left pleural effusion similar to prior. She will be admitted to the Hospital Medicine Service for further treatment.        /    * No surgery found *      Hospital Course:   She was admitted to the hospital medicine service for further treatment. Pulm was consulted---originally planned a bedside thoracentesis, then reassessed to r/o PE. Lovenox 1mg/kg has been started. VQ scan---intermittent probability, bilateral lower ext u/s negative.  Will d/c Lovenox 1mg/kg at this time. Treated with prophylactic Heparin.  She will need a pleural biopsy soon, but we decided to treat the patient with non-invasive methods initially. She responded well to IV lasix 80 mg and then another dose of Lasix 40 mg IV. We will start PO lasix 20 mg daily. Will need to follow up with Pulm outpatient. She will need a CXR outpatient to reaccess. She will need to follow up with her PCP and pulmonary to complete outpatient testing. Clinically this patient is much improved.     Consults:   Consults (From admission, onward)        Status Ordering Provider     Inpatient consult to [facility]iology[facility]  Once     Provider:  Noe Benitez MD    Acknowledged HUMA-FRANKLIN VELAZQUEZ     Inpatient consult to Pulmonology  Once     Provider:  (Not yet assigned)    Completed CRISTAL FAJARDO     Inpatient consult to Social Work/Case Management  Once     Provider:  (Not yet assigned)    Acknowledged INNOCENT-CHINA RICHARDSON          * Pleural effusion  Shortness of breath ---resolved    -Pulmonary consulted, we appreciate pulm  -Originally planning for thoracentesis, but there is also a  concern for PE.   -VQ scan showed intermittent probability---bilateral lower ext u/s is negative  -started Lovenox 1 mg/kg but now after imaging we have stopped the Lovenox 1 mg/kg and started Lovenox 40 mg daily  -Pulm ordered Lasix 80 mg IV once and then Lasix 60 mg IV once.  -will start the patient on Lasix 20 mg PO daily---instructed the patient to start this medication on 9/19/2019   -ordered supplemental O2  -nebs prn ordered  -she will need to follow up with Pulm outpatient and she will need to follow up with her PCP    Acute kidney injury superimposed on chronic kidney disease stage IV  Cr---2.3>2.2>2.5; has been 1.4-2.0 over the past year.  -checked renal u/s--  1. No evidence of hydronephrosis.  2. Small and echogenic left kidney with decreased perfusion consistent with chronic medical renal disease.  3. Mild elevated renal resistive indices.  4. Simple bilateral renal cysts.  -checked urine studies  -avoid nephrotoxic agents  -renally dose meds  -monitor bmp      Essential hypertension  Will monitor for now, b/p looks good.       Iron deficiency anemia  Stable at this time, follow.     COPD (chronic obstructive pulmonary disease) with chronic bronchitis  Chronic respiratory failure with hypoxia, on home O2 therapy    -supplemental O2 ordered  -cont Spiriva daily   -prn nebs    Chronic neck pain  Chronic pain  Primary osteoarthritis of both knees  Lumbar postlaminectomy syndrome  Cervical post-laminectomy syndrome    -no issues noted at this time  -cont home med Norco TID prn  -monitor      Shortness of breath-resolved as of 9/16/2019          Final Active Diagnoses:    Diagnosis Date Noted POA    PRINCIPAL PROBLEM:  Pleural effusion [J90]  Yes    Chronic respiratory failure with hypoxia, on home O2 therapy [J96.11, Z99.81] 07/18/2019 Not Applicable    Acute kidney injury superimposed on chronic kidney disease stage IV [N17.9, N18.9]  Yes    Essential hypertension [I10] 01/22/2018 Yes     Chronic     Iron deficiency anemia [D50.9] 01/22/2018 Yes    COPD (chronic obstructive pulmonary disease) with chronic bronchitis [J44.9] 10/08/2014 Yes     Chronic    Primary osteoarthritis of both knees [M17.0]  Yes    Chronic neck pain [M54.2, G89.29] 07/24/2012 Yes     Chronic    Cervical post-laminectomy syndrome [M96.1] 07/24/2012 Yes    Lumbar postlaminectomy syndrome [M96.1] 07/24/2012 Yes    Chronic pain [G89.29]  Yes      Problems Resolved During this Admission:    Diagnosis Date Noted Date Resolved POA    Shortness of breath [R06.02] 09/13/2019 09/16/2019 Yes       Discharged Condition: stable    Disposition: Home or Self Care    Follow Up:  Follow-up Information     Kingston Verduzco MD In 1 week.    Specialty:  Family Medicine  Why:  hospital follow up  Contact information:  [address] [facility]  [address]  [facility] [address]  [phone]             Geovanni Interiano MD In 1 week.    Specialty:  Pulmonary Disease  Why:  hospital follow up  Contact information:  [address] [facility] [address]  [phone]                 Patient Instructions:      Diet Cardiac     Notify your health care provider if you experience any of the following:  temperature >100.4     Notify your health care provider if you experience any of the following:  persistent nausea and vomiting or diarrhea     Notify your health care provider if you experience any of the following:  severe uncontrolled pain     Notify your health care provider if you experience any of the following:  difficulty breathing or increased cough     Notify your health care provider if you experience any of the following:  severe persistent headache     Notify your health care provider if you experience any of the following:  worsening rash     Notify your health care provider if you experience any of the following:  persistent dizziness, light-headedness, or visual disturbances     Notify your health care provider if you experience any of the following:   increased confusion or weakness     Activity as tolerated       Significant Diagnostic Studies: Labs:   BMP:   Recent Labs   Lab 09/15/19  0933   GLU 92   *   K 4.7   CL 96   CO2 22*   BUN 25*   CREATININE 2.5*   CALCIUM 9.7    and CBC   Recent Labs   Lab 09/15/19  0933   WBC 7.25   HGB 10.3*   HCT 33.4*          Pending Diagnostic Studies:     Procedure Component Value Units Date/Time    Echo Color Flow Doppler? Yes; Bubble Contrast? No [[id number]]     Order Status:  Sent Lab Status:  No result          Medications:  Reconciled Home Medications:      Medication List      START taking these medications    furosemide 20 MG tablet  Commonly known as:  LASIX  Take 1 tablet (20 mg total) by mouth 2 (two) times daily.  Start taking on:  9/19/2019        CHANGE how you take these medications    ipratropium-albuterol  mcg/actuation inhaler  Commonly known as:  COMBIVENT  Inhale 1 puff into the lungs every 4 (four) hours as needed for Wheezing. Rescue  What changed:  Another medication with the same name was removed. Continue taking this medication, and follow the directions you see here.        CONTINUE taking these medications    amLODIPine 10 MG tablet  Commonly known as:  NORVASC  Take 10 mg by mouth once daily.     busPIRone 5 MG Tab  Commonly known as:  BUSPAR  Take 5 mg by mouth.     CALCIUM 500 + D 500 mg(1,250mg) -200 unit per tablet  Generic drug:  calcium-vitamin D3  Take 1 tablet by mouth 2 (two) times daily with meals.     diazePAM 2 MG tablet  Commonly known as:  VALIUM  Take 2 mg by mouth.     DULoxetine 30 MG capsule  Commonly known as:  CYMBALTA  Take 1 capsule (30 mg total) by mouth once daily.     ergocalciferol 50,000 unit Cap  Commonly known as:  ERGOCALCIFEROL  TAKE 1 CAPSULE BY MOUTH EVERY 7 DAYS, FOR 12 WEEKS, THEN MONTHLY     fluticasone-salmeterol 500-50 mcg/dose 500-50 mcg/dose Dsdv diskus inhaler  Commonly known as:  ADVAIR DISKUS  Inhale 1 puff into the lungs 2 (two) times  daily. Controller     HYDROcodone-acetaminophen  mg per tablet  Commonly known as:  NORCO  Take 1 tablet by mouth 3 (three) times daily as needed.     sodium bicarbonate 650 MG tablet  Take 1 tablet (650 mg total) by mouth 2 (two) times daily.     traZODone 50 MG tablet  Commonly known as:  DESYREL  Take 1 tablet (50 mg total) by mouth every evening.     zolpidem 5 MG Tab  Commonly known as:  AMBIEN  Take 5 mg by mouth once daily.        STOP taking these medications    sulfamethoxazole-trimethoprim 800-160mg 800-160 mg Tab  Commonly known as:  BACTRIM DS            Indwelling Lines/Drains at time of discharge:   Lines/Drains/Airways          None          Time spent on the discharge of patient: 35 minutes  Patient was seen and examined on the date of discharge and determined to be suitable for discharge.         Jere Mercado NP  Department of Hospital Medicine  [facility]

## 2019-09-18 LAB
ACID FAST MOD KINY STN SPEC: NORMAL
MYCOBACTERIUM SPEC QL CULT: NORMAL

## 2019-09-23 DIAGNOSIS — G89.29 CHRONIC NECK PAIN: ICD-10-CM

## 2019-09-23 DIAGNOSIS — M16.0 PRIMARY OSTEOARTHRITIS OF BOTH HIPS: ICD-10-CM

## 2019-09-23 DIAGNOSIS — G89.29 CHRONIC MIDLINE LOW BACK PAIN WITH RIGHT-SIDED SCIATICA: ICD-10-CM

## 2019-09-23 DIAGNOSIS — M54.41 CHRONIC MIDLINE LOW BACK PAIN WITH RIGHT-SIDED SCIATICA: ICD-10-CM

## 2019-09-23 DIAGNOSIS — M54.2 CHRONIC NECK PAIN: ICD-10-CM

## 2019-09-23 RX ORDER — HYDROCODONE BITARTRATE AND ACETAMINOPHEN 10; 325 MG/1; MG/1
1 TABLET ORAL 3 TIMES DAILY PRN
Qty: 90 TABLET | Refills: 0 | Status: SHIPPED | OUTPATIENT
Start: 2019-09-25 | End: 2019-10-22 | Stop reason: SDUPTHER

## 2019-09-23 NOTE — TELEPHONE ENCOUNTER
Last Rx refill-----08/26/19  Last office visit-- 06/25/19  Next office visit--11/04/19         ----- Message from Shila Ambriz sent at 9/23/2019 11:32 AM CDT -----  Contact: self @ [phone]  Pt is req a refill for hydrocodone 10/325.    [facility]/pharmacy #[id number] - [place] - [address] [phone] (Phone)  [phone] (Fax)

## 2019-09-26 ENCOUNTER — OFFICE VISIT (OUTPATIENT)
Dept: PRIMARY CARE CLINIC | Facility: CLINIC | Age: 74
End: 2019-09-26
Payer: MEDICARE

## 2019-09-26 ENCOUNTER — HOSPITAL ENCOUNTER (OUTPATIENT)
Dept: RADIOLOGY | Facility: HOSPITAL | Age: 74
Discharge: HOME | End: 2019-09-26
Attending: INTERNAL MEDICINE
Payer: MEDICARE

## 2019-09-26 VITALS
DIASTOLIC BLOOD PRESSURE: 63 MMHG | SYSTOLIC BLOOD PRESSURE: 108 MMHG | WEIGHT: 126.13 LBS | HEART RATE: 101 BPM | OXYGEN SATURATION: 96 % | TEMPERATURE: 98 F | BODY MASS INDEX: 23.06 KG/M2

## 2019-09-26 DIAGNOSIS — J90 PLEURAL EFFUSION: ICD-10-CM

## 2019-09-26 DIAGNOSIS — N17.9 ACUTE KIDNEY INJURY SUPERIMPOSED ON CHRONIC KIDNEY DISEASE: ICD-10-CM

## 2019-09-26 DIAGNOSIS — N18.9 ACUTE KIDNEY INJURY SUPERIMPOSED ON CHRONIC KIDNEY DISEASE: ICD-10-CM

## 2019-09-26 DIAGNOSIS — I50.33 ACUTE ON CHRONIC DIASTOLIC HEART FAILURE: Primary | ICD-10-CM

## 2019-09-26 PROCEDURE — 71046 X-RAY EXAM CHEST 2 VIEWS: CPT | Mod: TC,HCNC,FY

## 2019-09-26 PROCEDURE — 99215 PR OFFICE/OUTPT VISIT, EST, LEVL V, 40-54 MIN: ICD-10-PCS | Mod: HCNC,S$GLB,, | Performed by: INTERNAL MEDICINE

## 2019-09-26 PROCEDURE — 99999 PR PBB SHADOW E&M-EST. PATIENT-LVL III: ICD-10-PCS | Mod: PBBFAC,HCNC,, | Performed by: INTERNAL MEDICINE

## 2019-09-26 PROCEDURE — 99499 RISK ADDL DX/OHS AUDIT: ICD-10-PCS | Mod: HCNC,S$GLB,, | Performed by: INTERNAL MEDICINE

## 2019-09-26 PROCEDURE — 1101F PR PT FALLS ASSESS DOC 0-1 FALLS W/OUT INJ PAST YR: ICD-10-PCS | Mod: HCNC,CPTII,S$GLB, | Performed by: INTERNAL MEDICINE

## 2019-09-26 PROCEDURE — 71046 XR CHEST PA AND LATERAL: ICD-10-PCS | Mod: 26,HCNC,, | Performed by: RADIOLOGY

## 2019-09-26 PROCEDURE — 99499 UNLISTED E&M SERVICE: CPT | Mod: HCNC,S$GLB,, | Performed by: INTERNAL MEDICINE

## 2019-09-26 PROCEDURE — 3074F SYST BP LT 130 MM HG: CPT | Mod: HCNC,CPTII,S$GLB, | Performed by: INTERNAL MEDICINE

## 2019-09-26 PROCEDURE — 3078F PR MOST RECENT DIASTOLIC BLOOD PRESSURE < 80 MM HG: ICD-10-PCS | Mod: HCNC,CPTII,S$GLB, | Performed by: INTERNAL MEDICINE

## 2019-09-26 PROCEDURE — 71046 X-RAY EXAM CHEST 2 VIEWS: CPT | Mod: 26,HCNC,, | Performed by: RADIOLOGY

## 2019-09-26 PROCEDURE — 99215 OFFICE O/P EST HI 40 MIN: CPT | Mod: HCNC,S$GLB,, | Performed by: INTERNAL MEDICINE

## 2019-09-26 PROCEDURE — 3074F PR MOST RECENT SYSTOLIC BLOOD PRESSURE < 130 MM HG: ICD-10-PCS | Mod: HCNC,CPTII,S$GLB, | Performed by: INTERNAL MEDICINE

## 2019-09-26 PROCEDURE — 1101F PT FALLS ASSESS-DOCD LE1/YR: CPT | Mod: HCNC,CPTII,S$GLB, | Performed by: INTERNAL MEDICINE

## 2019-09-26 PROCEDURE — 3078F DIAST BP <80 MM HG: CPT | Mod: HCNC,CPTII,S$GLB, | Performed by: INTERNAL MEDICINE

## 2019-09-26 PROCEDURE — 99999 PR PBB SHADOW E&M-EST. PATIENT-LVL III: CPT | Mod: PBBFAC,HCNC,, | Performed by: INTERNAL MEDICINE

## 2019-09-26 NOTE — PROGRESS NOTES
"[facility]   New Visit Progress Note   Recent Hospital Discharge     PRESENTING HISTORY     Chief Complaint/Reason for Admission:  Follow up Hospital Discharge   PCP: Kingston Verduzco MD    History of Present Illness:  Ms. Katie Quevedo is a 74 y.o. female who was recently admitted to the hospital.    [facility] Medicine  Discharge Summary        Patient Name: Katie Quevedo  MRN: [id number]  Admission Date: 9/13/2019  Hospital Length of Stay: 0 days  Discharge Date and Time:  09/16/2019 9:10 AM  Attending Physician: Jennifer Bowles*   Discharging Provider: Jere Mercado NP  Primary Care Provider: Kingston Verduzco MD   __________________________________________________________________    Today:  [facility].  Recently hospitalized for acute on chronic respiratory failure associated with recurrent left pleural effusion.  Patient has advanced COPD and is on home supplemental oxygen at baseline.   Patient with signs and symptoms of heart failure on this hospital admission, and responded well to diuresis.   She has not previously been evaluated for heart failure.  She responded well to diureses and supportive care and was discharged to home on Lasix 20 mg twice daily.    Prior to this admission, recurrent pleural effusion was thought to be malignant and she was awaiting VATS with pleural bx with [facility] CT surgery team.  This plan has been abandoned due to suspicion of heart failure being the etiology of recurrent pleural effusion.    She is unaccompanied today.  She relies on [employer] for transportation to medical appts.  She uses an electric scooter.  Supplemental oxygen in place.  Patient tells me she felt "really good" at time of hospital discharge, but has since experienced worsening shortness of breath.  Reports compliance with newly prescribed Lasix.     Reviewed recent PFT's- severe COPD noted.    She will see [facility] " Pulmonary- Dr Urena- 10/15/19.     Review of Systems  General ROS: negative for chills, fever or weight loss  Psychological ROS: negative for hallucination, depression or suicidal ideation  Ophthalmic ROS: negative for blurry vision, photophobia or eye pain  ENT ROS: negative for epistaxis, sore throat or rhinorrhea  Respiratory ROS: no cough,  or wheezing +shortness of breath  Cardiovascular ROS: no chest pain or dyspnea on exertion  Gastrointestinal ROS: no abdominal pain, change in bowel habits, or black/ bloody stools  Genito-Urinary ROS: no dysuria, trouble voiding, or hematuria  Musculoskeletal ROS: negative for gait disturbance or muscular weakness  Neurological ROS: no syncope or seizures; no ataxia  Dermatological ROS: negative for pruritis, rash and jaundice    PAST HISTORY:     Past Medical History:   Diagnosis Date    Anemia     Asthma     Bilateral renal cysts     Cataract     Chronic LBP 7/26/2012    Chronic pain     CKD (chronic kidney disease), stage V     COPD (chronic obstructive pulmonary disease)     Dehydration     Encounter for blood transfusion     HTN (hypertension)     Lumbar spondylosis     Melanoma     of the lip    Metabolic bone disease     Migraines, neuralgic     Osteoporosis     Primary osteoarthritis of both knees     s/p Rt TKA    Pulmonary embolism with infarction     Seizures     Subdeltoid bursitis, L>R. 9/27/2012    Ulcer     Vitamin D deficiency disease        Past Surgical History:   Procedure Laterality Date    BLADDER SUSPENSION      CATARACT EXTRACTION  11/18/13    left eye    CERVICAL LAMINECTOMY      x3, fusion x1    COLONOSCOPY  2009    HYSTERECTOMY      JOINT REPLACEMENT  2001    total right knee     LUMBAR LAMINECTOMY      x 3, fusion x1    OOPHORECTOMY         Family History   Problem Relation Age of Onset    Arthritis Mother     Stroke Mother     Hypertension Father     Cancer Father     Cataracts Father     Diabetes Maternal  Aunt     Hypertension Maternal Grandfather     Heart disease Maternal Grandfather     Heart attack Maternal Grandfather     Cataracts Sister     Glaucoma Cousin          MEDICATIONS & ALLERGIES:     Current Outpatient Medications on File Prior to Visit   Medication Sig Dispense Refill    amLODIPine (NORVASC) 10 MG tablet Take 10 mg by mouth once daily.      busPIRone (BUSPAR) 5 MG Tab Take 5 mg by mouth.      diazePAM (VALIUM) 2 MG tablet Take 2 mg by mouth.      DULoxetine (CYMBALTA) 30 MG capsule Take 1 capsule (30 mg total) by mouth once daily. 30 capsule 3    furosemide (LASIX) 20 MG tablet Take 1 tablet (20 mg total) by mouth 2 (two) times daily. 60 tablet 11    HYDROcodone-acetaminophen (NORCO)  mg per tablet Take 1 tablet by mouth 3 (three) times daily as needed. 90 tablet 0    ondansetron (ZOFRAN-ODT) 4 MG TbDL Dissolve one tablet under the tongue every 6 (six) hours as needed. 30 tablet 3    sodium bicarbonate 650 MG tablet Take 1 tablet (650 mg total) by mouth 2 (two) times daily. 120 tablet 11    tiotropium bromide (SPIRIVA RESPIMAT) 2.5 mcg/actuation Mist Inhale 5mcg ( 2 puffs ) by mouth  into the lungs once daily. 4 g 3    zolpidem (AMBIEN) 5 MG Tab Take 5 mg by mouth once daily.  5    calcium-vitamin D3 (CALCIUM 500 + D) 500 mg(1,250mg) -200 unit per tablet Take 1 tablet by mouth 2 (two) times daily with meals.      ergocalciferol (ERGOCALCIFEROL) 50,000 unit Cap TAKE 1 CAPSULE BY MOUTH EVERY 7 DAYS, FOR 12 WEEKS, THEN MONTHLY      fluticasone-salmeterol 500-50 mcg/dose (ADVAIR DISKUS) 500-50 mcg/dose DsDv diskus inhaler Inhale 1 puff into the lungs 2 (two) times daily. Controller (Patient not taking: Reported on 9/26/2019) 60 each 11    ipratropium-albuterol (COMBIVENT)  mcg/actuation inhaler Inhale 1 puff into the lungs every 4 (four) hours as needed for Wheezing. Rescue (Patient not taking: Reported on 9/26/2019) 1 Package 3    traZODone (DESYREL) 50 MG tablet Take 1  tablet (50 mg total) by mouth every evening. (Patient not taking: Reported on 9/26/2019) 30 tablet 11     No current facility-administered medications on file prior to visit.         Review of patient's allergies indicates:   Allergen Reactions    Aspirin      Other reaction(s): hx of ulcers    Tetracycline Swelling     Other reaction(s): Swelling    Penicillins Rash     Other reaction(s): Hives  Other reaction(s): Rash  Other reaction(s): Rash  Other reaction(s): Hives       OBJECTIVE:     Vital Signs:  /63 (BP Location: Left arm, Patient Position: Sitting, BP Method: Small (Automatic))   Pulse 101   Temp 98.2 °F (36.8 °C) (Oral)   Wt 57.2 kg (126 lb 1.7 oz)   SpO2 96%   BMI 23.06 kg/m²   Wt Readings from Last 1 Encounters:   09/26/19 1456 57.2 kg (126 lb 1.7 oz)     Body mass index is 23.06 kg/m².   96%    Physical Exam:  /63 (BP Location: Left arm, Patient Position: Sitting, BP Method: Small (Automatic))   Pulse 101   Temp 98.2 °F (36.8 °C) (Oral)   Wt 57.2 kg (126 lb 1.7 oz)   SpO2 96%   BMI 23.06 kg/m²   General appearance: alert, cooperative, no distress  Constitutional:Oriented to person, place, and time  + appears well-developed and well-nourished.   HEENT: Normocephalic, atraumatic, neck symmetrical, no nasal discharge   Eyes: conjunctivae/corneas clear, PERRL, EOM's intact  Lungs: + poor inspiratory effort, mild crackles bilateral bases , no wheezing   Heart: + increased rate and regular rhythm without rub; no displacement of the PMI   Abdomen: soft, non-tender; bowel sounds normoactive; no organomegaly  Extremities: extremities symmetric; no clubbing, cyanosis, or edema  Integument: Skin color, texture, turgor normal; no rashes; hair distrubution normal  Neurologic: Alert and oriented X 3, normal strength  Psychiatric: no pressured speech; normal affect; no evidence of impaired cognition     Laboratory  Lab Results   Component Value Date    WBC 7.25 09/15/2019    HGB 10.3 (L)  09/15/2019    HCT 33.4 (L) 09/15/2019    MCV 87 09/15/2019     09/15/2019     BMP  Lab Results   Component Value Date     (L) 09/15/2019    K 4.7 09/15/2019    CL 96 09/15/2019    CO2 22 (L) 09/15/2019    BUN 25 (H) 09/15/2019    CREATININE 2.5 (H) 09/15/2019    CALCIUM 9.7 09/15/2019    ANIONGAP 13 09/15/2019    ESTGFRAFRICA 21 (A) 09/15/2019    EGFRNONAA 18 (A) 09/15/2019     Lab Results   Component Value Date    ALT 8 (L) 09/13/2019    AST 23 09/13/2019    ALKPHOS 102 09/13/2019    BILITOT 0.3 09/13/2019     Lab Results   Component Value Date    INR 1.0 07/17/2019    INR 1.1 12/26/2018    INR 0.9 03/05/2018     Lab Results   Component Value Date    HGBA1C 5.2 01/22/2018       Diagnostic Results:  2 D Echo 9/16/19:  · Normal left ventricular systolic function. The estimated ejection fraction is 55%  · Grade II (moderate) left ventricular diastolic dysfunction consistent with pseudonormalization.  · Elevated left atrial pressure.  · No wall motion abnormalities.  · Normal right ventricular systolic function.  · No TR Jet to calculate PA pressure  · Intermediate central venous pressure (8 mm Hg).  · There is a large left pleural effusion.    Chest X Ray 9/13/19:  Moderate-sized left pleural effusion similar to prior.    VQ 9/14/19:  Left lower lobe triple match, technically intermediate probability for potential pulmonary embolus.    ASSESSMENT & PLAN:     Acute on chronic diastolic heart failure  -     Ambulatory consult to Cardiology    Pleural effusion  -     X-Ray Chest PA And Lateral; Future; Expected date: 09/26/2019    Acute kidney injury superimposed on chronic kidney disease stage IV  -     Basic metabolic panel; Future; Expected date: 09/26/2019    [facility] 11[facility].     Instructions for the patient:      Scheduled Follow-up :  Future Appointments   Date Time Provider Department Center   10/28/2019 10:30 AM Aura Diggs MD [facility]   10/30/2019  3:20 PM  Jason Anderson MD [facility] CARDIO [facility]   11/4/2019  4:00 PM Pushpa Mcmahon MD [facility]   11/5/2019  3:00 PM Clemencia Gambino MD [facility] IMPRI [facility]       Post Visit Medication List:     Medication List           Accurate as of September 26, 2019  3:07 PM. If you have any questions, ask your nurse or doctor.               CONTINUE taking these medications    amLODIPine 10 MG tablet  Commonly known as:  NORVASC     busPIRone 5 MG Tab  Commonly known as:  BUSPAR     CALCIUM 500 + D 500 mg(1,250mg) -200 unit per tablet  Generic drug:  calcium-vitamin D3     diazePAM 2 MG tablet  Commonly known as:  VALIUM     DULoxetine 30 MG capsule  Commonly known as:  CYMBALTA  Take 1 capsule (30 mg total) by mouth once daily.     ergocalciferol 50,000 unit Cap  Commonly known as:  ERGOCALCIFEROL     fluticasone-salmeterol 500-50 mcg/dose 500-50 mcg/dose Dsdv diskus inhaler  Commonly known as:  ADVAIR DISKUS  Inhale 1 puff into the lungs 2 (two) times daily. Controller     furosemide 20 MG tablet  Commonly known as:  LASIX  Take 1 tablet (20 mg total) by mouth 2 (two) times daily.     HYDROcodone-acetaminophen  mg per tablet  Commonly known as:  NORCO  Take 1 tablet by mouth 3 (three) times daily as needed.     ipratropium-albuterol  mcg/actuation inhaler  Commonly known as:  COMBIVENT  Inhale 1 puff into the lungs every 4 (four) hours as needed for Wheezing. Rescue     ondansetron 4 MG Tbdl  Commonly known as:  ZOFRAN-ODT  Dissolve one tablet under the tongue every 6 (six) hours as needed.     sodium bicarbonate 650 MG tablet  Take 1 tablet (650 mg total) by mouth 2 (two) times daily.     SPIRIVA RESPIMAT 2.5 mcg/actuation Mist  Generic drug:  tiotropium bromide  Inhale 5mcg ( 2 puffs ) by mouth  into the lungs once daily.     traZODone 50 MG tablet  Commonly known as:  DESYREL  Take 1 tablet (50 mg total) by mouth every evening.     zolpidem 5 MG Tab  Commonly known as:  AMBIEN             Signing Physician:  Clemencia Gambino MD

## 2019-09-27 ENCOUNTER — TELEPHONE (OUTPATIENT)
Dept: PRIMARY CARE CLINIC | Facility: CLINIC | Age: 74
End: 2019-09-27

## 2019-09-27 NOTE — TELEPHONE ENCOUNTER
----- Message from Clemencia Gambino MD sent at 9/27/2019 10:37 AM CDT -----  Chest X ray shows size of pleural effusion has decreased. Labs look good. Renal function still impaired but stable. Remain in current meds. No changes to meds or plan of care. Keep upcoming Cardiology and Pulmonary appts.

## 2019-09-27 NOTE — TELEPHONE ENCOUNTER
Notified patient of normal lab results. Notified patient that pleural effusion has decreased in size. Notified patient that we will not be making any changes to medication or plan of care at this time.

## 2019-10-07 ENCOUNTER — TELEPHONE (OUTPATIENT)
Dept: FAMILY MEDICINE | Facility: CLINIC | Age: 74
End: 2019-10-07

## 2019-10-07 ENCOUNTER — TELEPHONE (OUTPATIENT)
Dept: PRIMARY CARE CLINIC | Facility: CLINIC | Age: 74
End: 2019-10-07

## 2019-10-07 NOTE — TELEPHONE ENCOUNTER
Left message requesting callback.  Patient requesting hospital follow up however patient is already scheduled with Dr Gambino.

## 2019-10-07 NOTE — TELEPHONE ENCOUNTER
Mendel called in and asked for an appointment stating that she has a knot in her lower intestines, she says she can feel it. When I asked what her symptoms are she stated that she does not have any and that was the problem.  I scheduled an appointment for Thursday.

## 2019-10-07 NOTE — TELEPHONE ENCOUNTER
----- Message from Nadege Chandler sent at 10/7/2019 12:11 PM CDT -----  Contact: [phone]/self  Patient would like to be seen sooner for Hospital follow up. Please advise.

## 2019-10-10 ENCOUNTER — OFFICE VISIT (OUTPATIENT)
Dept: PRIMARY CARE CLINIC | Facility: CLINIC | Age: 74
End: 2019-10-10
Payer: MEDICARE

## 2019-10-10 VITALS
HEART RATE: 91 BPM | OXYGEN SATURATION: 91 % | BODY MASS INDEX: 22.98 KG/M2 | WEIGHT: 125.69 LBS | SYSTOLIC BLOOD PRESSURE: 112 MMHG | TEMPERATURE: 97 F | DIASTOLIC BLOOD PRESSURE: 67 MMHG

## 2019-10-10 DIAGNOSIS — D17.1 LIPOMA OF TORSO: ICD-10-CM

## 2019-10-10 PROCEDURE — 1101F PT FALLS ASSESS-DOCD LE1/YR: CPT | Mod: HCNC,CPTII,S$GLB, | Performed by: INTERNAL MEDICINE

## 2019-10-10 PROCEDURE — 99212 OFFICE O/P EST SF 10 MIN: CPT | Mod: HCNC,S$GLB,, | Performed by: INTERNAL MEDICINE

## 2019-10-10 PROCEDURE — 3078F PR MOST RECENT DIASTOLIC BLOOD PRESSURE < 80 MM HG: ICD-10-PCS | Mod: HCNC,CPTII,S$GLB, | Performed by: INTERNAL MEDICINE

## 2019-10-10 PROCEDURE — 1101F PR PT FALLS ASSESS DOC 0-1 FALLS W/OUT INJ PAST YR: ICD-10-PCS | Mod: HCNC,CPTII,S$GLB, | Performed by: INTERNAL MEDICINE

## 2019-10-10 PROCEDURE — 99212 PR OFFICE/OUTPT VISIT, EST, LEVL II, 10-19 MIN: ICD-10-PCS | Mod: HCNC,S$GLB,, | Performed by: INTERNAL MEDICINE

## 2019-10-10 PROCEDURE — 99999 PR PBB SHADOW E&M-EST. PATIENT-LVL III: CPT | Mod: PBBFAC,HCNC,, | Performed by: INTERNAL MEDICINE

## 2019-10-10 PROCEDURE — 3074F SYST BP LT 130 MM HG: CPT | Mod: HCNC,CPTII,S$GLB, | Performed by: INTERNAL MEDICINE

## 2019-10-10 PROCEDURE — 99999 PR PBB SHADOW E&M-EST. PATIENT-LVL III: ICD-10-PCS | Mod: PBBFAC,HCNC,, | Performed by: INTERNAL MEDICINE

## 2019-10-10 PROCEDURE — 3074F PR MOST RECENT SYSTOLIC BLOOD PRESSURE < 130 MM HG: ICD-10-PCS | Mod: HCNC,CPTII,S$GLB, | Performed by: INTERNAL MEDICINE

## 2019-10-10 PROCEDURE — 3078F DIAST BP <80 MM HG: CPT | Mod: HCNC,CPTII,S$GLB, | Performed by: INTERNAL MEDICINE

## 2019-10-10 NOTE — PATIENT INSTRUCTIONS
Lipoma, No Treatment  A lipoma is a local overgrowth of fatty tissue. It appears as a soft raised area, usually less than 2 inches across. It is a benign condition (not cancer).   Home care  General information regarding lipoma includes:  1. No special care is needed for a lipoma.  2. You can consider removal for cosmetic reasons.  3. Sometimes lipomas are uncomfortable because they put pressure on surrounding tissues. This is also a reason to have a lipoma removed.  Follow-up care  Follow up with your healthcare provider, or as advised if you want to have the lipoma removed at a later time.  When to seek medical advice  Call your healthcare provider right away if any of the following occur:  · Redness, pain, tenderness, or drainage from the lipoma  · Lipoma begins to enlarge or change shape  · Changes in the color of the skin over the lipoma  Date Last Reviewed: 6/1/2016  © 4125-7524 [employer]. [address] [place] [address]. All rights reserved. This information is not intended as a substitute for professional medical care. Always follow your healthcare professional's instructions.

## 2019-10-10 NOTE — PROGRESS NOTES
"Subjective:       Patient ID: Katie Quevedo is a 74 y.o. female.    Chief Complaint: Urgent Visit- "Abdominal knot"    HPI:  [facility] for [facility] evaluation for  Left lower "Abdominal Knot" she noted when pressing on her abdomen.   Non painful. Non tender.   Duration unknown, only recently noticed.    Review of Systems   Constitutional: Negative for chills and fever.   HENT: Negative for hearing loss.    Eyes: Negative for blurred vision.   Respiratory: Positive for shortness of breath. Negative for wheezing.    Cardiovascular: Positive for orthopnea. Negative for chest pain, leg swelling and PND.   Gastrointestinal: Negative for abdominal pain, heartburn, nausea and vomiting.   Genitourinary: Negative for dysuria.   Musculoskeletal: Negative for falls.   Skin: Negative for rash.   Neurological: Negative for dizziness.   Endo/Heme/Allergies: Does not bruise/bleed easily.   Psychiatric/Behavioral: Negative for depression. The patient is not nervous/anxious.            Objective:      Vital Signs:  Vitals:    10/10/19 0936   BP: 112/67   Pulse: 91   Temp: 97.1 °F (36.2 °C)     Wt Readings from Last 1 Encounters:   10/10/19 0936 57 kg (125 lb 10.6 oz)     Body mass index is 22.98 kg/m².   SpO2 Readings from Last 1 Encounters:   10/10/19 (!) 91%       Physical Exam   Constitutional: She is oriented to person, place, and time. No distress.   HENT:   Head: Normocephalic.   Eyes: Pupils are equal, round, and reactive to light.   Neck: Normal range of motion.   Cardiovascular: Normal rate and regular rhythm.   Abdominal: Soft. Bowel sounds are normal. She exhibits no distension. There is no tenderness.   + LLQ superficial soft mass, non mobile; non tender    Musculoskeletal: Normal range of motion. She exhibits no edema.   Neurological: She is alert and oriented to person, place, and time.   Skin: Skin is warm and dry.   Psychiatric: She has a normal mood and affect.           Assessment:       1. " Lipoma of torso        Plan:       Diagnoses and all orders for this visit:    Lipoma of torso  - suspect this is a lipoma, recommend monitoring, conservative management   - reassurance and education  provided             Scheduled Follow-up :  Future Appointments   Date Time Provider Department Center   10/28/2019 10:30 AM Aura Diggs MD [facility]   10/30/2019  3:20 PM Jason Anderson MD [facility] CARDIO [facility]   11/4/2019  4:00 PM Pushpa Mcmahon MD [facility] PHYSMED [facility]   11/5/2019  3:00 PM Clemencia Gambino MD [facility] IMPRI [facility]   11/14/2019  2:00 PM Sarita Spears MD [facility] GASTRO [facility]       Post Visit Medication List:     Medication List           Accurate as of October 10, 2019 10:06 AM. If you have any questions, ask your nurse or doctor.               CONTINUE taking these medications    amLODIPine 10 MG tablet  Commonly known as:  NORVASC     busPIRone 5 MG Tab  Commonly known as:  BUSPAR     CALCIUM 500 + D 500 mg(1,250mg) -200 unit per tablet  Generic drug:  calcium-vitamin D3     diazePAM 2 MG tablet  Commonly known as:  VALIUM     DULoxetine 30 MG capsule  Commonly known as:  CYMBALTA  Take 1 capsule (30 mg total) by mouth once daily.     ergocalciferol 50,000 unit Cap  Commonly known as:  ERGOCALCIFEROL     fluticasone-salmeterol 500-50 mcg/dose 500-50 mcg/dose Dsdv diskus inhaler  Commonly known as:  ADVAIR DISKUS  Inhale 1 puff into the lungs 2 (two) times daily. Controller     furosemide 20 MG tablet  Commonly known as:  LASIX  Take 1 tablet (20 mg total) by mouth 2 (two) times daily.     HYDROcodone-acetaminophen  mg per tablet  Commonly known as:  NORCO  Take 1 tablet by mouth 3 (three) times daily as needed.     ipratropium-albuterol  mcg/actuation inhaler  Commonly known as:  COMBIVENT  Inhale 1 puff into the lungs every 4 (four) hours as needed for Wheezing. Rescue     ondansetron 4 MG Tbdl  Commonly known as:  ZOFRAN-ODT  Dissolve one tablet under the  tongue every 6 (six) hours as needed.     sodium bicarbonate 650 MG tablet  Take 1 tablet (650 mg total) by mouth 2 (two) times daily.     SPIRIVA RESPIMAT 2.5 mcg/actuation Mist  Generic drug:  tiotropium bromide  Inhale 5mcg ( 2 puffs ) by mouth  into the lungs once daily.     traZODone 50 MG tablet  Commonly known as:  DESYREL  Take 1 tablet (50 mg total) by mouth every evening.     zolpidem 5 MG Tab  Commonly known as:  AMBIEN

## 2019-10-22 DIAGNOSIS — M54.2 CHRONIC NECK PAIN: ICD-10-CM

## 2019-10-22 DIAGNOSIS — G89.29 CHRONIC NECK PAIN: ICD-10-CM

## 2019-10-22 DIAGNOSIS — G89.29 CHRONIC MIDLINE LOW BACK PAIN WITH RIGHT-SIDED SCIATICA: ICD-10-CM

## 2019-10-22 DIAGNOSIS — M16.0 PRIMARY OSTEOARTHRITIS OF BOTH HIPS: ICD-10-CM

## 2019-10-22 DIAGNOSIS — M54.41 CHRONIC MIDLINE LOW BACK PAIN WITH RIGHT-SIDED SCIATICA: ICD-10-CM

## 2019-10-22 RX ORDER — HYDROCODONE BITARTRATE AND ACETAMINOPHEN 10; 325 MG/1; MG/1
1 TABLET ORAL 3 TIMES DAILY PRN
Qty: 90 TABLET | Refills: 0 | Status: SHIPPED | OUTPATIENT
Start: 2019-10-26 | End: 2019-11-22 | Stop reason: SDUPTHER

## 2019-10-22 NOTE — TELEPHONE ENCOUNTER
Last Rx refill-----09/23/19  Last office visit--06/25/19  Next office visit--11/04/19        ----- Message from Uzair Kim sent at 10/22/2019 11:32 AM CDT -----  Contact: Patient   Rx Refill/Request     Is this a Refill or New Rx:  Yes   Rx Name and Strength:  HYDROcodone-acetaminophen (NORCO)  mg per tablet    Preferred Pharmacy with phone number:     [facility]/pharmacy #[id number]  [place] - [address]  [address] [address] [place] [address]  Phone: [phone] Fax: [phone]

## 2019-10-24 ENCOUNTER — LAB VISIT (OUTPATIENT)
Dept: LAB | Facility: HOSPITAL | Age: 74
End: 2019-10-24
Attending: INTERNAL MEDICINE
Payer: MEDICARE

## 2019-10-24 DIAGNOSIS — N18.4 CKD (CHRONIC KIDNEY DISEASE) STAGE 4, GFR 15-29 ML/MIN: ICD-10-CM

## 2019-10-24 LAB
ALBUMIN SERPL BCP-MCNC: 3.9 G/DL (ref 3.5–5.2)
MAGNESIUM SERPL-MCNC: 2.2 MG/DL (ref 1.6–2.6)
PHOSPHATE SERPL-MCNC: 3.2 MG/DL (ref 2.7–4.5)
URATE SERPL-MCNC: 11 MG/DL (ref 2.4–5.7)

## 2019-10-24 PROCEDURE — 84100 ASSAY OF PHOSPHORUS: CPT | Mod: HCNC

## 2019-10-24 PROCEDURE — 83735 ASSAY OF MAGNESIUM: CPT | Mod: HCNC

## 2019-10-24 PROCEDURE — 36415 COLL VENOUS BLD VENIPUNCTURE: CPT | Mod: HCNC

## 2019-10-24 PROCEDURE — 82040 ASSAY OF SERUM ALBUMIN: CPT | Mod: HCNC

## 2019-10-24 PROCEDURE — 84550 ASSAY OF BLOOD/URIC ACID: CPT | Mod: HCNC

## 2019-10-25 ENCOUNTER — IMMUNIZATION (OUTPATIENT)
Dept: PHARMACY | Facility: CLINIC | Age: 74
End: 2019-10-25
Payer: MEDICARE

## 2019-10-27 ENCOUNTER — NURSE TRIAGE (OUTPATIENT)
Dept: ADMINISTRATIVE | Facility: CLINIC | Age: 74
End: 2019-10-27

## 2019-10-27 NOTE — TELEPHONE ENCOUNTER
Reason for Disposition   Nausea is a chronic symptom (recurrent or ongoing AND present > 4 weeks)    Additional Information   Negative: Shock suspected (e.g., cold/pale/clammy skin, too weak to stand, low BP, rapid pulse)   Negative: Sounds like a life-threatening emergency to the triager   Negative: [1] Nausea or vomiting AND [2] pregnancy < 20 weeks   Negative: Menstrual Period - Missed or Late (i.e., pregnancy suspected)   Negative: Heat exhaustion suspected (i.e., dehydration from heat exposure)   Negative: Motion sickness suspected (i.e., nausea with car, plane, boat, or train travel)   Negative: Anxiety or stress suspected (i.e., nausea with anxiety attacks or stressful situations)   Negative: Traumatic Brain Injury (TBI) suspected   Negative: Vomiting occurs   Negative: Nausea (or Vomiting) in a cancer patient who is currently (or recently) receiving chemotherapy or radiation therapy, or cancer patient who has metastatic or end-stage cancer and is receiving palliative care   Negative: Other symptom is present, see that guideline.  (e.g., chest pain, headache, dizziness, abdominal pain, colds, sore throat, etc.).   Negative: Unable to walk, or can only walk with assistance (e.g., requires support)   Negative: Difficulty breathing   Negative: [1] Insulin-dependent diabetes (Type I) AND [2] glucose > 400 mg/dl (22 mmol/l)   Negative: [1] Drinking very little AND [2] dehydration suspected (e.g., no urine > 12 hours, very dry mouth, very lightheaded)   Negative: Patient sounds very sick or weak to the triager   Negative: Fever > 104 F (40 C)   Negative: [1] Fever > 101 F (38.3 C) AND [2] age > 60   Negative: [1] Fever > 100.0 F (37.8 C) AND [2] bedridden (e.g., nursing home patient, CVA, chronic illness, recovering from surgery)   Negative: [1] Fever > 100.0 F (37.8 C) AND [2] diabetes mellitus or weak immune system (e.g., HIV positive, cancer chemo, splenectomy, chronic steroids)    Negative: Taking any of the following medications: digoxin (Lanoxin), lithium, theophylline, phenytoin (Dilantin)   Negative: Yellowish color of the skin or white of the eye (i.e., jaundice)   Negative: Fever present > 3 days (72 hours)   Negative: Receiving cancer chemotherapy medication   Negative: Taking prescription medication that could cause nausea (e.g., narcotics/opiates, antibiotics, OCPs, many others)   Negative: Nausea persists > 1 week   Negative: Alcohol or drug abuse, known or suspected    Protocols used: NAUSEA-A-AH

## 2019-10-28 ENCOUNTER — LAB VISIT (OUTPATIENT)
Dept: LAB | Facility: HOSPITAL | Age: 74
End: 2019-10-28
Attending: INTERNAL MEDICINE
Payer: MEDICARE

## 2019-10-28 DIAGNOSIS — N18.4 CKD (CHRONIC KIDNEY DISEASE) STAGE 4, GFR 15-29 ML/MIN: ICD-10-CM

## 2019-10-28 LAB
ANION GAP SERPL CALC-SCNC: 13 MMOL/L (ref 8–16)
BUN SERPL-MCNC: 36 MG/DL (ref 8–23)
CALCIUM SERPL-MCNC: 10.6 MG/DL (ref 8.7–10.5)
CHLORIDE SERPL-SCNC: 92 MMOL/L (ref 95–110)
CO2 SERPL-SCNC: 28 MMOL/L (ref 23–29)
CREAT SERPL-MCNC: 3.4 MG/DL (ref 0.5–1.4)
EST. GFR  (AFRICAN AMERICAN): 14 ML/MIN/1.73 M^2
EST. GFR  (NON AFRICAN AMERICAN): 12 ML/MIN/1.73 M^2
GLUCOSE SERPL-MCNC: 100 MG/DL (ref 70–110)
POTASSIUM SERPL-SCNC: 3.6 MMOL/L (ref 3.5–5.1)
SODIUM SERPL-SCNC: 133 MMOL/L (ref 136–145)

## 2019-10-28 PROCEDURE — 36415 COLL VENOUS BLD VENIPUNCTURE: CPT | Mod: HCNC

## 2019-10-28 PROCEDURE — 80048 BASIC METABOLIC PNL TOTAL CA: CPT | Mod: HCNC

## 2019-10-30 ENCOUNTER — OFFICE VISIT (OUTPATIENT)
Dept: CARDIOLOGY | Facility: CLINIC | Age: 74
End: 2019-10-30
Payer: MEDICARE

## 2019-10-30 VITALS
DIASTOLIC BLOOD PRESSURE: 67 MMHG | BODY MASS INDEX: 23.67 KG/M2 | OXYGEN SATURATION: 95 % | SYSTOLIC BLOOD PRESSURE: 115 MMHG | WEIGHT: 129.44 LBS | HEART RATE: 93 BPM

## 2019-10-30 DIAGNOSIS — I50.32 CHRONIC DIASTOLIC HEART FAILURE: ICD-10-CM

## 2019-10-30 DIAGNOSIS — Z99.81 CHRONIC RESPIRATORY FAILURE WITH HYPOXIA, ON HOME O2 THERAPY: ICD-10-CM

## 2019-10-30 DIAGNOSIS — J44.89 COPD (CHRONIC OBSTRUCTIVE PULMONARY DISEASE) WITH CHRONIC BRONCHITIS: Primary | Chronic | ICD-10-CM

## 2019-10-30 DIAGNOSIS — J96.11 CHRONIC RESPIRATORY FAILURE WITH HYPOXIA, ON HOME O2 THERAPY: ICD-10-CM

## 2019-10-30 PROCEDURE — 3078F PR MOST RECENT DIASTOLIC BLOOD PRESSURE < 80 MM HG: ICD-10-PCS | Mod: HCNC,CPTII,S$GLB, | Performed by: STUDENT IN AN ORGANIZED HEALTH CARE EDUCATION/TRAINING PROGRAM

## 2019-10-30 PROCEDURE — 1101F PT FALLS ASSESS-DOCD LE1/YR: CPT | Mod: HCNC,CPTII,S$GLB, | Performed by: STUDENT IN AN ORGANIZED HEALTH CARE EDUCATION/TRAINING PROGRAM

## 2019-10-30 PROCEDURE — 1101F PR PT FALLS ASSESS DOC 0-1 FALLS W/OUT INJ PAST YR: ICD-10-PCS | Mod: HCNC,CPTII,S$GLB, | Performed by: STUDENT IN AN ORGANIZED HEALTH CARE EDUCATION/TRAINING PROGRAM

## 2019-10-30 PROCEDURE — 99204 PR OFFICE/OUTPT VISIT, NEW, LEVL IV, 45-59 MIN: ICD-10-PCS | Mod: HCNC,S$GLB,, | Performed by: STUDENT IN AN ORGANIZED HEALTH CARE EDUCATION/TRAINING PROGRAM

## 2019-10-30 PROCEDURE — 99499 RISK ADDL DX/OHS AUDIT: ICD-10-PCS | Mod: HCNC,S$GLB,, | Performed by: STUDENT IN AN ORGANIZED HEALTH CARE EDUCATION/TRAINING PROGRAM

## 2019-10-30 PROCEDURE — 99499 UNLISTED E&M SERVICE: CPT | Mod: HCNC,S$GLB,, | Performed by: STUDENT IN AN ORGANIZED HEALTH CARE EDUCATION/TRAINING PROGRAM

## 2019-10-30 PROCEDURE — 3078F DIAST BP <80 MM HG: CPT | Mod: HCNC,CPTII,S$GLB, | Performed by: STUDENT IN AN ORGANIZED HEALTH CARE EDUCATION/TRAINING PROGRAM

## 2019-10-30 PROCEDURE — 99204 OFFICE O/P NEW MOD 45 MIN: CPT | Mod: HCNC,S$GLB,, | Performed by: STUDENT IN AN ORGANIZED HEALTH CARE EDUCATION/TRAINING PROGRAM

## 2019-10-30 PROCEDURE — 99999 PR PBB SHADOW E&M-EST. PATIENT-LVL III: CPT | Mod: PBBFAC,HCNC,, | Performed by: STUDENT IN AN ORGANIZED HEALTH CARE EDUCATION/TRAINING PROGRAM

## 2019-10-30 PROCEDURE — 99999 PR PBB SHADOW E&M-EST. PATIENT-LVL III: ICD-10-PCS | Mod: PBBFAC,HCNC,, | Performed by: STUDENT IN AN ORGANIZED HEALTH CARE EDUCATION/TRAINING PROGRAM

## 2019-10-30 PROCEDURE — 3074F PR MOST RECENT SYSTOLIC BLOOD PRESSURE < 130 MM HG: ICD-10-PCS | Mod: HCNC,CPTII,S$GLB, | Performed by: STUDENT IN AN ORGANIZED HEALTH CARE EDUCATION/TRAINING PROGRAM

## 2019-10-30 PROCEDURE — 3074F SYST BP LT 130 MM HG: CPT | Mod: HCNC,CPTII,S$GLB, | Performed by: STUDENT IN AN ORGANIZED HEALTH CARE EDUCATION/TRAINING PROGRAM

## 2019-10-30 NOTE — LETTER
October 30, 2019      Clemencia Gambino MD  [address]  [address]  [place] [address]           [facility] Cardiology  [address]  [facility] [address]  Phone: [phone]          Patient: Katie Quevedo   MR Number: [id number]   YOB: 1945   Date of Visit: 10/30/2019       Dear Dr. Clemencia Gambino:    Thank you for referring Katie Quevedo to me for evaluation. Attached you will find relevant portions of my assessment and plan of care.    If you have questions, please do not hesitate to call me. I look forward to following Katie Quevedo along with you.    Sincerely,    Jason Anderson MD    Enclosure  CC:  No Recipients    If you would like to receive this communication electronically, please contact [email or web address] or [phone] to request more information on [employer] access.    For providers and/or their staff who would like to refer a patient to [facility], please contact us through our one-stop-shop provider referral line, [facility], at [phone].    If you feel you have received this communication in error or would no longer like to receive these types of communications, please e-mail [email or web address]

## 2019-10-30 NOTE — PROGRESS NOTES
Cardiology Clinic note    Subjective:   Patient ID:  Katie Quevedo is a 74 y.o. female who presents for evaluation of diastolic heart failure    HPI:   Katie Quevedo  has a past medical history of Anemia, Asthma, Bilateral renal cysts, Cataract, Chronic LBP (7/26/2012), Chronic pain, CKD (chronic kidney disease), stage V, COPD (chronic obstructive pulmonary disease), Dehydration, Encounter for blood transfusion, HTN (hypertension), Lumbar spondylosis, Melanoma, Metabolic bone disease, Migraines, neuralgic, Osteoporosis, Primary osteoarthritis of both knees, Pulmonary embolism with infarction, Seizures, Subdeltoid bursitis, L>R. (9/27/2012), Ulcer, and Vitamin D deficiency disease.    10/30/19: new patient, referred from Dr. Gambino. Pt previous saw Dr. Morrow in 2017 for possible PE at that time.  Pt has longstanding COPD and progressive CKD at this time.   Her echo is c/w grade II diastolic dysfunction. Pt notes a significant change in her BERTRAND and functional capacity. Whether this is due to progressive COPD vs diastolic heart failure, unsure.  Notes she has to use a scotter for long distances. Smoker for 50 plus years, quit about 5 years ago.  No prior hx of CAD. Normal LVEF.    Vitals  Vitals:    10/30/19 1533   BP: 115/67   Pulse: 93   SpO2: 95%   Weight: 58.7 kg (129 lb 6.6 oz)       Patient Active Problem List    Diagnosis Date Noted    Chronic diastolic heart failure 10/30/2019    Lipoma of torso 10/10/2019    Lung mass     Chronic respiratory failure with hypoxia, on home O2 therapy 07/18/2019    Acute kidney injury superimposed on chronic kidney disease stage IV     Anemia, chronic renal failure, stage 4 (severe)     Pericardial effusion 07/05/2019    Pleural effusion     Medical non-compliance 04/26/2018    History of colon polyps 02/06/2018    Atrophy of left kidney 01/25/2018    Kidney cysts 01/25/2018    Iron deficiency anemia 01/22/2018    Essential hypertension 01/22/2018    Chronic low  back pain 09/25/2015    Osteoporosis 09/21/2015    Osteoarthritis, hip, bilateral 10/27/2014    Lumbar radiculopathy, BLE 10/27/2014    Cervical radiculopathy, BUE 10/27/2014    COPD (chronic obstructive pulmonary disease) with chronic bronchitis 10/08/2014    Biceps tendonitis 01/06/2014    Rotator cuff tear, right 10/18/2013    Nuclear sclerosis - Both Eyes 08/08/2013    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    Melanoma     Chronic pain     Vitamin deficiency        Patient's Medications   New Prescriptions    No medications on file   Previous Medications    ALLOPURINOL (ZYLOPRIM) 100 MG TABLET    Take 1 tablet (100 mg total) by mouth once daily. Take 100 mg -on Monday and Friday only    AMLODIPINE (NORVASC) 10 MG TABLET    Take 10 mg by mouth once daily.    BUSPIRONE (BUSPAR) 5 MG TAB    Take 5 mg by mouth.    CALCIUM-VITAMIN D3 (CALCIUM 500 + D) 500 MG(1,250MG) -200 UNIT PER TABLET    Take 1 tablet by mouth 2 (two) times daily with meals.    DIAZEPAM (VALIUM) 2 MG TABLET    Take 2 mg by mouth.    DULOXETINE (CYMBALTA) 30 MG CAPSULE    Take 1 capsule (30 mg total) by mouth once daily.    ERGOCALCIFEROL (ERGOCALCIFEROL) 50,000 UNIT CAP    TAKE 1 CAPSULE BY MOUTH EVERY 7 DAYS, FOR 12 WEEKS, THEN MONTHLY    FLUTICASONE-SALMETEROL 500-50 MCG/DOSE (ADVAIR DISKUS) 500-50 MCG/DOSE DSDV DISKUS INHALER    Inhale 1 puff into the lungs 2 (two) times daily. Controller    FUROSEMIDE (LASIX) 20 MG TABLET    Take 1 tablet (20 mg total) by mouth 2 (two) times daily.    HYDROCODONE-ACETAMINOPHEN (NORCO)  MG PER TABLET    Take 1 tablet by mouth 3 (three) times daily as needed.     IPRATROPIUM-ALBUTEROL (COMBIVENT)  MCG/ACTUATION INHALER    Inhale 1 puff into the lungs every 4 (four) hours as needed for Wheezing. Rescue    ONDANSETRON (ZOFRAN-ODT) 4 MG TBDL    Dissolve one tablet under the tongue every 6 (six) hours as needed.    SODIUM BICARBONATE 650 MG TABLET    Take 1 tablet (650 mg total) by mouth 2 (two) times daily.    TIOTROPIUM BROMIDE (SPIRIVA RESPIMAT) 2.5 MCG/ACTUATION MIST    Inhale 5mcg ( 2 puffs ) by mouth  into the lungs once daily.    TRAZODONE (DESYREL) 50 MG TABLET    Take 1 tablet (50 mg total) by mouth every evening.    ZOLPIDEM (AMBIEN) 5 MG TAB    Take 5 mg by mouth once daily.   Modified Medications    No medications on file   Discontinued Medications    No medications on file      Right Arm BP - Sittin/67  Left Arm BP - Sittin/70  Review of Systems   Constitution: Positive for malaise/fatigue. Negative for chills, decreased appetite and weight gain.   HENT: Negative for congestion and ear discharge.    Eyes: Negative for blurred vision and double vision.   Cardiovascular: Positive for dyspnea on exertion. Negative for chest pain, cyanosis, irregular heartbeat, leg swelling, near-syncope, orthopnea, palpitations and syncope.   Respiratory: Positive for shortness of breath. Negative for cough and sleep disturbances due to breathing.    Skin: Negative for color change and dry skin.   Musculoskeletal: Negative for joint pain, joint swelling and muscle cramps.   Gastrointestinal: Negative for bloating, heartburn, hematemesis and hematochezia.   Genitourinary: Negative for bladder incontinence and dysuria.   Neurological: Negative for aphonia, excessive daytime sleepiness, dizziness, focal weakness, headaches, light-headedness, loss of balance and weakness.   Psychiatric/Behavioral: Negative for altered mental status, depression and memory loss. The patient does not have insomnia and is not nervous/anxious.          Objective:   Physical Exam    Constitutional: She is oriented to person, place, and time. She appears well-developed and well-nourished.   HENT:   Head: Normocephalic and atraumatic.   Eyes: Conjunctivae and EOM are normal.   Neck: Normal range of motion. Neck supple. No JVD present.   Cardiovascular: Normal rate, regular rhythm and normal heart sounds.   No murmur heard.  Pulmonary/Chest: Effort normal and breath sounds normal. No respiratory distress. She has no wheezes. She has no rales.   Abdominal: Soft. Bowel sounds are normal. She exhibits no distension.   Musculoskeletal: Normal range of motion. She exhibits no edema.   Neurological: She is alert and oriented to person, place, and time.   Skin: Skin is warm and dry. No erythema.   Psychiatric: She has a normal mood and affect. Her behavior is normal. Judgment and thought content normal.   Nursing note and vitals reviewed.      Lab Results    Lab Results   Component Value Date     (L) 10/28/2019    K 3.6 10/28/2019    CL 92 (L) 10/28/2019    CO2 28 10/28/2019    BUN 36 (H) 10/28/2019    CREATININE 3.4 (H) 10/28/2019     10/28/2019    HGBA1C 5.2 01/22/2018    MG 2.2 10/24/2019    AST 23 09/13/2019    ALT 8 (L) 09/13/2019    ALBUMIN 3.9 10/24/2019    PROT 8.0 09/13/2019    BILITOT 0.3 09/13/2019    WBC 7.25 09/15/2019    HGB 10.3 (L) 09/15/2019    HCT 33.4 (L) 09/15/2019    MCV 87 09/15/2019     09/15/2019    INR 1.0 07/17/2019    TSH 0.572 07/17/2019    CHOL 166 07/17/2019    HDL 47 07/17/2019    LDLCALC 89.8 07/17/2019    TRIG 146 07/17/2019    CRP 3.0 01/23/2009     (H) 09/13/2019       Lipid panel  Lab Results   Component Value Date    CHOL 166 07/17/2019     Lab Results   Component Value Date    HDL 47 07/17/2019     Lab Results   Component Value Date    LDLCALC 89.8 07/17/2019     Lab Results   Component Value Date    TRIG 146 07/17/2019       Cardiac Studies  Significant Imaging: Echocardiogram: .Sept 2019  · Normal left ventricular systolic function. The  estimated ejection fraction is 55%  · Grade II (moderate) left ventricular diastolic dysfunction consistent with pseudonormalization.  · Elevated left atrial pressure.  · No wall motion abnormalities.  · Normal right ventricular systolic function.  · No TR Jet to calculate PA pressure  · Intermediate central venous pressure (8 mm Hg).  · There is a large left pleural effusion.         ECG:  sinus tachycardia.    Cath study : n/a    Assessment:     1. COPD (chronic obstructive pulmonary disease) with chronic bronchitis    2. Chronic respiratory failure with hypoxia, on home O2 therapy    3. Chronic diastolic heart failure        Plan:     1. Chronic diastolic heart failure  - known DD since at least 2018  - grade II, pseudonorm with elevated LAP  - already on lasix 20mg - good response with lasix  - soft rec to try to add ARB to regiment for cardiac remodeling. Cant add losartan at this time due to progressive acute on chronic kidney disease. Cr at 3.5 yesterday, prior baselines around 2.5  - will try to add losartan in 3 months if renal function stablizes. Dont want to risk dialysis earlier with [facility] due to ARB  - talked about low salt diet, <2000mg per day    2. COPD  - mx per pulm/pcp    3. CKD  - mx per nephrology    4. HTN controlled    5. Tobacco use: 50 yr hx, 5 yrs quit    Continue with current medical plan and lifestyle changes.  Return sooner for concerns or questions. If symptoms persist go to the ED  Total duration of face to face visit time 30 minutes.  Total time spent counseling greater than fifty percent of total visit time.  Counseling included discussion regarding imaging findings, diagnosis, possibilities, treatment options, risks and benefits.      No orders of the defined types were placed in this encounter.      Follow up as scheduled. Return to clinic in 3 months - potentially adding arb if renal function is stable   She expressed verbal understanding and agreed with the plan    Thank you for  the opportunity to care for this patient. Will be available for questions if needed.     Jason Anderson MD [facility]  Interventional Cardiology  [facility]  Pager: [phone]

## 2019-11-04 ENCOUNTER — OFFICE VISIT (OUTPATIENT)
Dept: PHYSICAL MEDICINE AND REHAB | Facility: CLINIC | Age: 74
End: 2019-11-04
Payer: MEDICARE

## 2019-11-04 VITALS
HEIGHT: 62 IN | HEART RATE: 87 BPM | DIASTOLIC BLOOD PRESSURE: 68 MMHG | BODY MASS INDEX: 23.55 KG/M2 | SYSTOLIC BLOOD PRESSURE: 110 MMHG | WEIGHT: 128 LBS

## 2019-11-04 DIAGNOSIS — M25.511 CHRONIC RIGHT SHOULDER PAIN: ICD-10-CM

## 2019-11-04 DIAGNOSIS — G89.29 CHRONIC NECK PAIN: ICD-10-CM

## 2019-11-04 DIAGNOSIS — M96.1 LUMBAR POSTLAMINECTOMY SYNDROME: ICD-10-CM

## 2019-11-04 DIAGNOSIS — M17.0 PRIMARY OSTEOARTHRITIS OF BOTH KNEES: ICD-10-CM

## 2019-11-04 DIAGNOSIS — M75.51 SUBDELTOID BURSITIS, RIGHT: ICD-10-CM

## 2019-11-04 DIAGNOSIS — G89.29 CHRONIC RIGHT SHOULDER PAIN: ICD-10-CM

## 2019-11-04 DIAGNOSIS — M54.2 CHRONIC NECK PAIN: ICD-10-CM

## 2019-11-04 DIAGNOSIS — Z79.891 CHRONICALLY ON OPIATE THERAPY: ICD-10-CM

## 2019-11-04 DIAGNOSIS — M54.41 CHRONIC MIDLINE LOW BACK PAIN WITH RIGHT-SIDED SCIATICA: Primary | ICD-10-CM

## 2019-11-04 DIAGNOSIS — G89.29 CHRONIC MIDLINE LOW BACK PAIN WITH RIGHT-SIDED SCIATICA: Primary | ICD-10-CM

## 2019-11-04 DIAGNOSIS — M47.26 OSTEOARTHRITIS OF SPINE WITH RADICULOPATHY, LUMBAR REGION: ICD-10-CM

## 2019-11-04 DIAGNOSIS — M96.1 CERVICAL POST-LAMINECTOMY SYNDROME: ICD-10-CM

## 2019-11-04 DIAGNOSIS — M16.0 PRIMARY OSTEOARTHRITIS OF BOTH HIPS: ICD-10-CM

## 2019-11-04 PROCEDURE — 3074F SYST BP LT 130 MM HG: CPT | Mod: HCNC,CPTII,S$GLB, | Performed by: PHYSICAL MEDICINE & REHABILITATION

## 2019-11-04 PROCEDURE — 3078F DIAST BP <80 MM HG: CPT | Mod: HCNC,CPTII,S$GLB, | Performed by: PHYSICAL MEDICINE & REHABILITATION

## 2019-11-04 PROCEDURE — 3074F PR MOST RECENT SYSTOLIC BLOOD PRESSURE < 130 MM HG: ICD-10-PCS | Mod: HCNC,CPTII,S$GLB, | Performed by: PHYSICAL MEDICINE & REHABILITATION

## 2019-11-04 PROCEDURE — 99999 PR PBB SHADOW E&M-EST. PATIENT-LVL II: ICD-10-PCS | Mod: PBBFAC,HCNC,, | Performed by: PHYSICAL MEDICINE & REHABILITATION

## 2019-11-04 PROCEDURE — 99214 PR OFFICE/OUTPT VISIT, EST, LEVL IV, 30-39 MIN: ICD-10-PCS | Mod: HCNC,S$GLB,, | Performed by: PHYSICAL MEDICINE & REHABILITATION

## 2019-11-04 PROCEDURE — 1101F PT FALLS ASSESS-DOCD LE1/YR: CPT | Mod: HCNC,CPTII,S$GLB, | Performed by: PHYSICAL MEDICINE & REHABILITATION

## 2019-11-04 PROCEDURE — 99214 OFFICE O/P EST MOD 30 MIN: CPT | Mod: HCNC,S$GLB,, | Performed by: PHYSICAL MEDICINE & REHABILITATION

## 2019-11-04 PROCEDURE — 3078F PR MOST RECENT DIASTOLIC BLOOD PRESSURE < 80 MM HG: ICD-10-PCS | Mod: HCNC,CPTII,S$GLB, | Performed by: PHYSICAL MEDICINE & REHABILITATION

## 2019-11-04 PROCEDURE — 1101F PR PT FALLS ASSESS DOC 0-1 FALLS W/OUT INJ PAST YR: ICD-10-PCS | Mod: HCNC,CPTII,S$GLB, | Performed by: PHYSICAL MEDICINE & REHABILITATION

## 2019-11-04 PROCEDURE — 99999 PR PBB SHADOW E&M-EST. PATIENT-LVL II: CPT | Mod: PBBFAC,HCNC,, | Performed by: PHYSICAL MEDICINE & REHABILITATION

## 2019-11-04 RX ORDER — DULOXETIN HYDROCHLORIDE 30 MG/1
60 CAPSULE, DELAYED RELEASE ORAL DAILY
Start: 2019-11-04 | End: 2019-12-04

## 2019-11-04 RX ORDER — BACLOFEN 10 MG/1
10 TABLET ORAL 3 TIMES DAILY PRN
Qty: 90 TABLET | Refills: 1 | Status: SHIPPED | OUTPATIENT
Start: 2019-11-04 | End: 2019-11-26 | Stop reason: SDUPTHER

## 2019-11-04 NOTE — PROGRESS NOTES
Subjective:       Patient ID: Katie Quevedo is a 74 y.o. female.    Chief Complaint: No chief complaint on file.    HPI    HISTORY OF PRESENT ILLNESS:  Mrs. Quevedo is a 74-year-old white female with past medical history of pulmonary hypertension, on Eliquis therapy and generalized osteoarthritis.  She is followed up in the Physical Medicine Clinic for chronic low back pain with lumbar radiculopathy, post-laminectomy syndrome, chronic neck pain with cervical radiculopathy, post-cervical laminectomy syndrome and recurrent right subdeltoid bursitis.  Her last visit to the clinic was on 6/25/19.  She was maintained on duloxetine, hydrocodone,  and p.r.n. Trazodone.  A pain clinic drug screen was obtained and was positive as expected for hydrocodone.    The patient comes today to the clinic for followup.  Her low back pain has been worse.  It is a constant aching pain in the lumbar spine.  She has occasional radiation to the right foot with numbness.  Her maximum pain is 10/10 and minimum 3-4/10.  Today, it is 3-4/10.  The patient complains of bilateral lower extremity weakness without change.  She denies any bowel or bladder incontinence.  She complains of muscle spasm in her back.  He is asking for muscle relaxant.  In the past cyclobenzaprine and tizanidine did not help.    Her neck pain has also been worse.  It is a constant aching and stiffness pain in the cervical spine.  It is usually localized.  It is worse with excess movement.  Her maximum pain is 9-10/10 and minimum 1-2/10.  Today, it is 2-3/10.  The patient has chronic right hand  weakness without change.      Bilateral knee pain has been under good control.    She is currently taking Cymbalta 30 mg p.o. once per day.  She takes hydrocodone/APAP 10/325 p.r.n., usually three times per day.  She takes trazodone 50 mg po qhs as needed for sleep.        Review of Systems   Constitutional: Positive for fatigue.   Eyes: Negative for visual disturbance.    Respiratory: Positive for shortness of breath.    Cardiovascular: Negative for chest pain.   Gastrointestinal: Positive for constipation and nausea. Negative for vomiting.   Genitourinary: Negative for difficulty urinating.   Musculoskeletal: Positive for back pain, gait problem and neck pain.   Neurological: Negative for dizziness and headaches.   Psychiatric/Behavioral: Positive for sleep disturbance. Negative for behavioral problems.       Objective:      Physical Exam   Constitutional: She is oriented to person, place, and time. She appears well-developed and well-nourished.   Coming to the clinic in an electric scooter.  On oxygen through nasal cannula.   Neck:   Decreased ROM.  +ve mild tenderness.   Musculoskeletal:   BUE:  ROM: decreased at shoulder abduction, Rt worse than Lt.  +ve Heberden's & Nilsa's nodes.  Strength:    RUE: 3+/5 at shoulder abduction, 5- elbow flexion, elbow extension, hand .   LUE: 3+/5 at shoulder abduction, 5- elbow flexion, elbow extension, hand .  Sensation to pinprick:   RUE: mild decrease.   LUE: intact.      BLE:  ROM:full.  +ve bilateral knee crepitus.   Strength:    RLE: 4/5 at hip flexion, 4+ knee extension, 4 ankle DF, 4+ PF.   LLE: 4/5 at hip flexion, 4+ knee extension, 4+ ankle DF, 4+ PF.  Sensation to pinprick:     RLE: mild decrease.      LLE: intact.   SLR (sitting):      RLE: +ve.      LLE: -ve.    +ve mild tenderness lumbar spine.   Neurological: She is alert and oriented to person, place, and time.   Psychiatric: She has a normal mood and affect.   Vitals reviewed.        Assessment:       1. Chronic midline low back pain with right-sided sciatica    2. Osteoarthritis of spine with radiculopathy, lumbar region    3. Lumbar postlaminectomy syndrome    4. Chronic neck pain    5. Cervical post-laminectomy syndrome    6. Primary osteoarthritis of both knees    7. Primary osteoarthritis of both hips    8. Subdeltoid bursitis, right    9. Chronic right  shoulder pain    10. Chronically on opiate therapy        Plan:     - trial of increasing DULoxetine (CYMBALTA) 30 MG capsule; Take 2 capsules (60 mg total) by mouth once daily.  - Continue hydrocodone-acetaminophen (PERCOCET)  mg per tablet; Take 1 tablet by mouth 3 (three) times daily as needed for Pain.  - Continue trazodone 50 mg p.o. q.h.s. p.r.n. for help with sleep.  - Start baclofen (LIORESAL) 10 MG tablet; Take 1 tablet (10 mg total) by mouth 3 (three) times daily as needed (muscle spasms).  - Regular home exercise program was encouraged.  - No follow-ups on file.      This was a 25 minute visit, more than 50% of which was spent counseling the patient about the diagnosis and the treatment plan.

## 2019-11-04 NOTE — TELEPHONE ENCOUNTER
----- Message from Suzi Ordaz sent at 11/4/2019 11:48 AM CST -----  Contact: pt  Pt called and would the nurse says very important about script    Pt can be reached at [phone]

## 2019-11-04 NOTE — TELEPHONE ENCOUNTER
Patient states she was told by pharmacy that Dr Verduzco cancelled her prescription for Spiriva.  I advised that he did not, that this information was false.  It looks like she is due for a new script. Sent request to physician.

## 2019-11-05 ENCOUNTER — OFFICE VISIT (OUTPATIENT)
Dept: PRIMARY CARE CLINIC | Facility: CLINIC | Age: 74
End: 2019-11-05
Payer: MEDICARE

## 2019-11-05 VITALS
TEMPERATURE: 97 F | HEART RATE: 82 BPM | DIASTOLIC BLOOD PRESSURE: 66 MMHG | OXYGEN SATURATION: 96 % | BODY MASS INDEX: 23.12 KG/M2 | SYSTOLIC BLOOD PRESSURE: 114 MMHG | WEIGHT: 127.44 LBS

## 2019-11-05 DIAGNOSIS — N18.9 ACUTE KIDNEY INJURY SUPERIMPOSED ON CHRONIC KIDNEY DISEASE: ICD-10-CM

## 2019-11-05 DIAGNOSIS — J96.11 CHRONIC RESPIRATORY FAILURE WITH HYPOXIA, ON HOME O2 THERAPY: Primary | ICD-10-CM

## 2019-11-05 DIAGNOSIS — N17.9 ACUTE KIDNEY INJURY SUPERIMPOSED ON CHRONIC KIDNEY DISEASE: ICD-10-CM

## 2019-11-05 DIAGNOSIS — Z99.81 CHRONIC RESPIRATORY FAILURE WITH HYPOXIA, ON HOME O2 THERAPY: Primary | ICD-10-CM

## 2019-11-05 DIAGNOSIS — I50.32 CHRONIC DIASTOLIC HEART FAILURE: ICD-10-CM

## 2019-11-05 PROCEDURE — 99214 PR OFFICE/OUTPT VISIT, EST, LEVL IV, 30-39 MIN: ICD-10-PCS | Mod: HCNC,S$GLB,, | Performed by: INTERNAL MEDICINE

## 2019-11-05 PROCEDURE — 3078F DIAST BP <80 MM HG: CPT | Mod: HCNC,CPTII,S$GLB, | Performed by: INTERNAL MEDICINE

## 2019-11-05 PROCEDURE — 3074F PR MOST RECENT SYSTOLIC BLOOD PRESSURE < 130 MM HG: ICD-10-PCS | Mod: HCNC,CPTII,S$GLB, | Performed by: INTERNAL MEDICINE

## 2019-11-05 PROCEDURE — 99999 PR PBB SHADOW E&M-EST. PATIENT-LVL III: ICD-10-PCS | Mod: PBBFAC,HCNC,, | Performed by: INTERNAL MEDICINE

## 2019-11-05 PROCEDURE — 99214 OFFICE O/P EST MOD 30 MIN: CPT | Mod: HCNC,S$GLB,, | Performed by: INTERNAL MEDICINE

## 2019-11-05 PROCEDURE — 1101F PR PT FALLS ASSESS DOC 0-1 FALLS W/OUT INJ PAST YR: ICD-10-PCS | Mod: HCNC,CPTII,S$GLB, | Performed by: INTERNAL MEDICINE

## 2019-11-05 PROCEDURE — 3078F PR MOST RECENT DIASTOLIC BLOOD PRESSURE < 80 MM HG: ICD-10-PCS | Mod: HCNC,CPTII,S$GLB, | Performed by: INTERNAL MEDICINE

## 2019-11-05 PROCEDURE — 1101F PT FALLS ASSESS-DOCD LE1/YR: CPT | Mod: HCNC,CPTII,S$GLB, | Performed by: INTERNAL MEDICINE

## 2019-11-05 PROCEDURE — 3074F SYST BP LT 130 MM HG: CPT | Mod: HCNC,CPTII,S$GLB, | Performed by: INTERNAL MEDICINE

## 2019-11-05 PROCEDURE — 99499 UNLISTED E&M SERVICE: CPT | Mod: HCNC,S$GLB,, | Performed by: INTERNAL MEDICINE

## 2019-11-05 PROCEDURE — 99999 PR PBB SHADOW E&M-EST. PATIENT-LVL III: CPT | Mod: PBBFAC,HCNC,, | Performed by: INTERNAL MEDICINE

## 2019-11-05 PROCEDURE — 99499 RISK ADDL DX/OHS AUDIT: ICD-10-PCS | Mod: HCNC,S$GLB,, | Performed by: INTERNAL MEDICINE

## 2019-11-05 NOTE — PROGRESS NOTES
Subjective:       Patient ID: Katie Quevedo is a 74 y.o. female.    Chief Complaint: Hospital Follow Up    HPI:  [facility] for [facility] hospital follow up.  Recently hospitalized for acute on chronic respiratory failure associated with recurrent left pleural effusion.  Patient has advanced COPD and is on home supplemental oxygen at baseline.   Patient with signs and symptoms of heart failure on this hospital admission, and responded well to diuresis.   She has not previously been evaluated for heart failure.  She responded well to diureses and supportive care and was discharged to home on Lasix 20 mg twice daily.    Prior to this admission, recurrent pleural effusion was thought to be malignant and she was awaiting VATS with pleural bx with [facility] CT surgery team.  This plan has been abandoned due to suspicion of heart failure being the etiology of recurrent pleural effusion.    Reviewed recent PFT's- severe COPD noted.    Missed her [facility] appt with Dr Urena on 10/15/19.     Seen recently by Nephrology team (Dr Aura Diggs) for FU CKD 4.  Notes reviewed.  Allopurinol added to medication regimen.    Established new Cardiology care with Dr Anderson.  Notes reviewed.   Considering adding ARB in future if renal function allows.     Followed by [facility] for chronic pain management.  Notes reviewed.       Review of Systems   Constitutional: Negative for chills, fever and weight loss.   HENT: Negative for hearing loss.    Eyes: Negative for blurred vision.   Respiratory: Positive for shortness of breath. Negative for cough and wheezing.    Cardiovascular: Positive for orthopnea. Negative for chest pain, leg swelling and PND.   Gastrointestinal: Negative for heartburn, nausea and vomiting.   Genitourinary: Negative for dysuria.   Musculoskeletal: Negative for falls.   Skin: Negative for rash.   Neurological: Negative for weakness.   Endo/Heme/Allergies: Does not bruise/bleed easily.    Psychiatric/Behavioral: Negative for memory loss. The patient is nervous/anxious.            Objective:      Vital Signs:  Vitals:    11/05/19 1501   BP: 114/66   Pulse: 82   Temp: 97.3 °F (36.3 °C)     Wt Readings from Last 1 Encounters:   11/04/19 1600 58.1 kg (128 lb)     Body mass index is 23.12 kg/m².   SpO2 Readings from Last 1 Encounters:   10/30/19 95%       Physical Exam   Constitutional: She is oriented to person, place, and time. She appears well-developed and well-nourished. No distress.   HENT:   Head: Normocephalic.   Eyes: Pupils are equal, round, and reactive to light.   Neck: Normal range of motion. Neck supple. No JVD present. No thyromegaly present.   Cardiovascular: Normal rate and regular rhythm.   No murmur heard.  Pulmonary/Chest: No respiratory distress. She has no wheezes.   + poor inspiratory effort, minimal air movement    Abdominal: Soft. Bowel sounds are normal. She exhibits no distension.   Musculoskeletal: She exhibits no edema.   Neurological: She is alert and oriented to person, place, and time.   Skin: Skin is warm and dry. There is pallor.   Psychiatric: She has a normal mood and affect.           Assessment:       1. Chronic respiratory failure with hypoxia, on home O2 therapy    2. Acute kidney injury superimposed on chronic kidney disease stage IV    3. Chronic diastolic heart failure        Plan:       Diagnoses and all orders for this visit:    Chronic respiratory failure with hypoxia, on home O2 therapy  - multiple recent admissions for resp failure due to COPD, further complicated by recurrent pleural effusion  - [facility] team previously considering VATS with pleural bx, but this plan was later deferred due to suspicion of heart failure being the etiology of recurrent pleural effusion.   - patient would like to seek another opinion and establish new Pulmonology care within the [facility] system   -     Ambulatory consult to Pulmonology    Acute kidney injury superimposed  on chronic kidney disease stage IV  - managed by Dr Aura Diggs    Chronic diastolic heart failure  - she has established care with Dr Anderson       Patient is current on annual influenza vaccine.   I will see patient again in [facility] 12/27/2019.       Scheduled Follow-up :  Future Appointments   Date Time Provider Department Center   12/18/2019  2:00 PM Giancarlo Rust MD [facility] PULMS[facility] [address]   12/27/2019  2:00 PM Clemencia Gambino MD [facility] IMPRI [place] [facility]   12/30/2019 10:30 AM Aura Diggs MD [facility]   1/29/2020  2:40 PM Jason Anderson MD [facility] CARDIO [place] [facility]   3/16/2020  2:40 PM [facility]       Post Visit Medication List:     Medication List           Accurate as of November 5, 2019 11:59 PM. If you have any questions, ask your nurse or doctor.               CONTINUE taking these medications    allopurinol 100 MG tablet  Commonly known as:  ZYLOPRIM  Take 1 tablet (100 mg total) by mouth once daily. Take 100 mg -on Monday and Friday only     amLODIPine 10 MG tablet  Commonly known as:  NORVASC     baclofen 10 MG tablet  Commonly known as:  LIORESAL  Take 1 tablet (10 mg total) by mouth 3 (three) times daily as needed (muscle spasms).     busPIRone 5 MG Tab  Commonly known as:  BUSPAR     CALCIUM 500 + D 500 mg(1,250mg) -200 unit per tablet  Generic drug:  calcium-vitamin D3     diazePAM 2 MG tablet  Commonly known as:  VALIUM     DULoxetine 30 MG capsule  Commonly known as:  CYMBALTA  Take 2 capsules (60 mg total) by mouth once daily.     ergocalciferol 50,000 unit Cap  Commonly known as:  ERGOCALCIFEROL     furosemide 20 MG tablet  Commonly known as:  LASIX  Take 1 tablet (20 mg total) by mouth 2 (two) times daily.     HYDROcodone-acetaminophen  mg per tablet  Commonly known as:  NORCO  Take 1 tablet by mouth 3 (three) times daily as needed.     ondansetron 4 MG Tbdl  Commonly known as:  ZOFRAN-ODT  Dissolve one tablet under the tongue every  6 (six) hours as needed.     sodium bicarbonate 650 MG tablet  Take 1 tablet (650 mg total) by mouth 2 (two) times daily.     tiotropium bromide 2.5 mcg/actuation Mist  Commonly known as:  SPIRIVA RESPIMAT  Inhale 5mcg ( 2 puffs ) by mouth  into the lungs once daily.     traZODone 50 MG tablet  Commonly known as:  DESYREL  Take 1 tablet (50 mg total) by mouth every evening.     zolpidem 5 MG Tab  Commonly known as:  AMBIEN

## 2019-11-06 ENCOUNTER — TELEPHONE (OUTPATIENT)
Dept: PHYSICAL MEDICINE AND REHAB | Facility: CLINIC | Age: 74
End: 2019-11-06

## 2019-11-06 NOTE — TELEPHONE ENCOUNTER
----- Message from Shila Gooddina sent at 11/5/2019  4:54 PM CST -----  Contact: self @ [phone]  Pt says she is not able to take her new prescription for baclofen (LIORESAL) 10 MG tablet.  She says she was reading up on the paper work and it says not to take if you have kidney issues.  Pt would like to discuss getting something else sent in.  Pls call.

## 2019-11-07 RX ORDER — TIZANIDINE 2 MG/1
4 TABLET ORAL 3 TIMES DAILY PRN
Qty: 60 TABLET | Refills: 0 | Status: SHIPPED | OUTPATIENT
Start: 2019-11-07 | End: 2019-11-27

## 2019-11-07 NOTE — TELEPHONE ENCOUNTER
I called the patient.  She does not feel comfortable taking baclofen due to warnings about kidney function impairment.  I told her I can send prescription for a low-dose tizanidine.

## 2019-11-08 ENCOUNTER — LAB VISIT (OUTPATIENT)
Dept: LAB | Facility: HOSPITAL | Age: 74
End: 2019-11-08
Attending: INTERNAL MEDICINE
Payer: MEDICARE

## 2019-11-08 ENCOUNTER — TELEPHONE (OUTPATIENT)
Dept: FAMILY MEDICINE | Facility: CLINIC | Age: 74
End: 2019-11-08

## 2019-11-08 DIAGNOSIS — N18.4 CKD (CHRONIC KIDNEY DISEASE) STAGE 4, GFR 15-29 ML/MIN: ICD-10-CM

## 2019-11-08 DIAGNOSIS — N18.5 CKD (CHRONIC KIDNEY DISEASE), SYMPTOM MANAGEMENT ONLY, STAGE 5: ICD-10-CM

## 2019-11-08 LAB
ALBUMIN SERPL BCP-MCNC: 3.7 G/DL (ref 3.5–5.2)
ANION GAP SERPL CALC-SCNC: 11 MMOL/L (ref 8–16)
BUN SERPL-MCNC: 28 MG/DL (ref 8–23)
CALCIUM SERPL-MCNC: 9.9 MG/DL (ref 8.7–10.5)
CHLORIDE SERPL-SCNC: 100 MMOL/L (ref 95–110)
CO2 SERPL-SCNC: 27 MMOL/L (ref 23–29)
CREAT SERPL-MCNC: 2.4 MG/DL (ref 0.5–1.4)
EST. GFR  (AFRICAN AMERICAN): 22 ML/MIN/1.73 M^2
EST. GFR  (NON AFRICAN AMERICAN): 19 ML/MIN/1.73 M^2
GLUCOSE SERPL-MCNC: 95 MG/DL (ref 70–110)
MAGNESIUM SERPL-MCNC: 2.2 MG/DL (ref 1.6–2.6)
MAGNESIUM SERPL-MCNC: 2.2 MG/DL (ref 1.6–2.6)
PHOSPHATE SERPL-MCNC: 4.7 MG/DL (ref 2.7–4.5)
PHOSPHATE SERPL-MCNC: 4.7 MG/DL (ref 2.7–4.5)
POTASSIUM SERPL-SCNC: 3.8 MMOL/L (ref 3.5–5.1)
SODIUM SERPL-SCNC: 138 MMOL/L (ref 136–145)

## 2019-11-08 PROCEDURE — 82040 ASSAY OF SERUM ALBUMIN: CPT | Mod: HCNC

## 2019-11-08 PROCEDURE — 80048 BASIC METABOLIC PNL TOTAL CA: CPT | Mod: HCNC

## 2019-11-08 PROCEDURE — 83735 ASSAY OF MAGNESIUM: CPT | Mod: HCNC

## 2019-11-08 PROCEDURE — 36415 COLL VENOUS BLD VENIPUNCTURE: CPT | Mod: HCNC

## 2019-11-08 PROCEDURE — 84100 ASSAY OF PHOSPHORUS: CPT | Mod: HCNC

## 2019-11-08 RX ORDER — ALBUTEROL SULFATE 0.83 MG/ML
2.5 SOLUTION RESPIRATORY (INHALATION) EVERY 6 HOURS PRN
Qty: 100 EACH | Refills: 3 | Status: ON HOLD | OUTPATIENT
Start: 2019-11-08 | End: 2020-02-14 | Stop reason: HOSPADM

## 2019-11-11 ENCOUNTER — LAB VISIT (OUTPATIENT)
Dept: LAB | Facility: HOSPITAL | Age: 74
End: 2019-11-11
Attending: INTERNAL MEDICINE
Payer: MEDICARE

## 2019-11-11 DIAGNOSIS — N18.5 CKD (CHRONIC KIDNEY DISEASE), SYMPTOM MANAGEMENT ONLY, STAGE 5: ICD-10-CM

## 2019-11-14 PROBLEM — Z91.199 MEDICAL NON-COMPLIANCE: Status: RESOLVED | Noted: 2018-04-26 | Resolved: 2019-11-14

## 2019-11-22 DIAGNOSIS — M54.2 CHRONIC NECK PAIN: ICD-10-CM

## 2019-11-22 DIAGNOSIS — G89.29 CHRONIC MIDLINE LOW BACK PAIN WITH RIGHT-SIDED SCIATICA: ICD-10-CM

## 2019-11-22 DIAGNOSIS — G89.29 CHRONIC NECK PAIN: ICD-10-CM

## 2019-11-22 DIAGNOSIS — M54.41 CHRONIC MIDLINE LOW BACK PAIN WITH RIGHT-SIDED SCIATICA: ICD-10-CM

## 2019-11-22 DIAGNOSIS — M16.0 PRIMARY OSTEOARTHRITIS OF BOTH HIPS: ICD-10-CM

## 2019-11-22 RX ORDER — HYDROCODONE BITARTRATE AND ACETAMINOPHEN 10; 325 MG/1; MG/1
1 TABLET ORAL 3 TIMES DAILY PRN
Qty: 90 TABLET | Refills: 0 | Status: SHIPPED | OUTPATIENT
Start: 2019-11-25 | End: 2019-12-23 | Stop reason: SDUPTHER

## 2019-11-22 NOTE — TELEPHONE ENCOUNTER
Last Rx refill-----10/22/19  Last office visit--11/04/19  Next office visit--03/16/20        ----- Message from Shila Ambriz sent at 11/22/2019  9:44 AM CST -----  Contact: self @ [phone]  Pt is req a refill for hydrocodone 10/325.    [facility]/pharmacy #[id number] - [place] - [address]   [phone] (Phone)      [phone] (Fax)

## 2019-11-26 ENCOUNTER — TELEPHONE (OUTPATIENT)
Dept: SURGERY | Facility: CLINIC | Age: 74
End: 2019-11-26

## 2019-11-26 RX ORDER — BACLOFEN 10 MG/1
10 TABLET ORAL 3 TIMES DAILY PRN
Qty: 90 TABLET | Refills: 1 | Status: ON HOLD | OUTPATIENT
Start: 2019-11-26 | End: 2020-02-03 | Stop reason: CLARIF

## 2019-12-03 DIAGNOSIS — G89.29 CHRONIC NECK PAIN: ICD-10-CM

## 2019-12-03 DIAGNOSIS — M54.2 CHRONIC NECK PAIN: ICD-10-CM

## 2019-12-03 DIAGNOSIS — G89.29 CHRONIC MIDLINE LOW BACK PAIN WITH RIGHT-SIDED SCIATICA: ICD-10-CM

## 2019-12-03 DIAGNOSIS — M54.41 CHRONIC MIDLINE LOW BACK PAIN WITH RIGHT-SIDED SCIATICA: ICD-10-CM

## 2019-12-03 DIAGNOSIS — M16.0 PRIMARY OSTEOARTHRITIS OF BOTH HIPS: ICD-10-CM

## 2019-12-03 RX ORDER — DULOXETIN HYDROCHLORIDE 30 MG/1
CAPSULE, DELAYED RELEASE ORAL
Qty: 90 CAPSULE | Refills: 1 | Status: SHIPPED | OUTPATIENT
Start: 2019-12-03 | End: 2019-12-28

## 2019-12-04 ENCOUNTER — OFFICE VISIT (OUTPATIENT)
Dept: SURGERY | Facility: CLINIC | Age: 74
End: 2019-12-04
Payer: MEDICARE

## 2019-12-04 VITALS
DIASTOLIC BLOOD PRESSURE: 62 MMHG | TEMPERATURE: 98 F | WEIGHT: 129.88 LBS | HEART RATE: 96 BPM | HEIGHT: 63 IN | SYSTOLIC BLOOD PRESSURE: 94 MMHG | BODY MASS INDEX: 23.01 KG/M2

## 2019-12-04 DIAGNOSIS — N17.9 ACUTE KIDNEY INJURY SUPERIMPOSED ON CHRONIC KIDNEY DISEASE: Primary | ICD-10-CM

## 2019-12-04 DIAGNOSIS — N18.9 ACUTE KIDNEY INJURY SUPERIMPOSED ON CHRONIC KIDNEY DISEASE: Primary | ICD-10-CM

## 2019-12-04 PROCEDURE — 99214 PR OFFICE/OUTPT VISIT, EST, LEVL IV, 30-39 MIN: ICD-10-PCS | Mod: HCNC,S$GLB,, | Performed by: STUDENT IN AN ORGANIZED HEALTH CARE EDUCATION/TRAINING PROGRAM

## 2019-12-04 PROCEDURE — 1101F PT FALLS ASSESS-DOCD LE1/YR: CPT | Mod: HCNC,CPTII,S$GLB, | Performed by: STUDENT IN AN ORGANIZED HEALTH CARE EDUCATION/TRAINING PROGRAM

## 2019-12-04 PROCEDURE — 99999 PR PBB SHADOW E&M-EST. PATIENT-LVL III: CPT | Mod: PBBFAC,HCNC,, | Performed by: STUDENT IN AN ORGANIZED HEALTH CARE EDUCATION/TRAINING PROGRAM

## 2019-12-04 PROCEDURE — 3074F PR MOST RECENT SYSTOLIC BLOOD PRESSURE < 130 MM HG: ICD-10-PCS | Mod: HCNC,CPTII,S$GLB, | Performed by: STUDENT IN AN ORGANIZED HEALTH CARE EDUCATION/TRAINING PROGRAM

## 2019-12-04 PROCEDURE — 99214 OFFICE O/P EST MOD 30 MIN: CPT | Mod: HCNC,S$GLB,, | Performed by: STUDENT IN AN ORGANIZED HEALTH CARE EDUCATION/TRAINING PROGRAM

## 2019-12-04 PROCEDURE — 1159F PR MEDICATION LIST DOCUMENTED IN MEDICAL RECORD: ICD-10-PCS | Mod: HCNC,S$GLB,, | Performed by: STUDENT IN AN ORGANIZED HEALTH CARE EDUCATION/TRAINING PROGRAM

## 2019-12-04 PROCEDURE — 3078F DIAST BP <80 MM HG: CPT | Mod: HCNC,CPTII,S$GLB, | Performed by: STUDENT IN AN ORGANIZED HEALTH CARE EDUCATION/TRAINING PROGRAM

## 2019-12-04 PROCEDURE — 3074F SYST BP LT 130 MM HG: CPT | Mod: HCNC,CPTII,S$GLB, | Performed by: STUDENT IN AN ORGANIZED HEALTH CARE EDUCATION/TRAINING PROGRAM

## 2019-12-04 PROCEDURE — 99499 RISK ADDL DX/OHS AUDIT: ICD-10-PCS | Mod: HCNC,S$GLB,, | Performed by: STUDENT IN AN ORGANIZED HEALTH CARE EDUCATION/TRAINING PROGRAM

## 2019-12-04 PROCEDURE — 3078F PR MOST RECENT DIASTOLIC BLOOD PRESSURE < 80 MM HG: ICD-10-PCS | Mod: HCNC,CPTII,S$GLB, | Performed by: STUDENT IN AN ORGANIZED HEALTH CARE EDUCATION/TRAINING PROGRAM

## 2019-12-04 PROCEDURE — 1125F AMNT PAIN NOTED PAIN PRSNT: CPT | Mod: HCNC,S$GLB,, | Performed by: STUDENT IN AN ORGANIZED HEALTH CARE EDUCATION/TRAINING PROGRAM

## 2019-12-04 PROCEDURE — 1101F PR PT FALLS ASSESS DOC 0-1 FALLS W/OUT INJ PAST YR: ICD-10-PCS | Mod: HCNC,CPTII,S$GLB, | Performed by: STUDENT IN AN ORGANIZED HEALTH CARE EDUCATION/TRAINING PROGRAM

## 2019-12-04 PROCEDURE — 1159F MED LIST DOCD IN RCRD: CPT | Mod: HCNC,S$GLB,, | Performed by: STUDENT IN AN ORGANIZED HEALTH CARE EDUCATION/TRAINING PROGRAM

## 2019-12-04 PROCEDURE — 1125F PR PAIN SEVERITY QUANTIFIED, PAIN PRESENT: ICD-10-PCS | Mod: HCNC,S$GLB,, | Performed by: STUDENT IN AN ORGANIZED HEALTH CARE EDUCATION/TRAINING PROGRAM

## 2019-12-04 PROCEDURE — 99999 PR PBB SHADOW E&M-EST. PATIENT-LVL III: ICD-10-PCS | Mod: PBBFAC,HCNC,, | Performed by: STUDENT IN AN ORGANIZED HEALTH CARE EDUCATION/TRAINING PROGRAM

## 2019-12-04 PROCEDURE — 99499 UNLISTED E&M SERVICE: CPT | Mod: HCNC,S$GLB,, | Performed by: STUDENT IN AN ORGANIZED HEALTH CARE EDUCATION/TRAINING PROGRAM

## 2019-12-04 NOTE — LETTER
December 6, 2019      Aura Diggs MD  [address]  [address]  [facility]  [place] [id number]           [facility]  [address]  [place] [address]  Phone: [phone]          Patient: Katie Quevedo   MR Number: [id number]   YOB: 1945   Date of Visit: 12/4/2019       Dear Dr. Aura Diggs:    Thank you for referring Katie Quevedo to me for evaluation. Attached you will find relevant portions of my assessment and plan of care.    If you have questions, please do not hesitate to call me. I look forward to following Katie Quevedo along with you.    Sincerely,    Luis Alberto Madrid MD    Enclosure  CC:  No Recipients    If you would like to receive this communication electronically, please contact [email or web address] or [phone] to request more information on [employer] Link access.    For providers and/or their staff who would like to refer a patient to [facility], please contact us through our one-stop-shop provider referral line, [facility], at [phone].    If you feel you have received this communication in error or would no longer like to receive these types of communications, please e-mail [email or web address]

## 2019-12-04 NOTE — PROGRESS NOTES
[facility] Surgery  General Surgery  History & Physical      Subjective:     Chief Complaint: stage V kidney disease, referred for PD cath placement    History of Present Illness:  Patient is a 74 y.o. female with multiple medical co morbidities, including but not limited to seizures, PE, melanoma of the lip, HTN, COPD (Dr. Levine), hx of recurrent L pleural effusion with hospitalization 11/14/19, centrilobular emphysema, and CKD V who presents to clinic today for discussion of PD cath placement.     PSH: bladder suspension, ANIVAL-BSO    Review of systems: see HPI    Current Outpatient Medications on File Prior to Visit   Medication Sig    albuterol (PROVENTIL) 2.5 mg /3 mL (0.083 %) nebulizer solution Take 3 mLs (2.5 mg total) by nebulization every 6 (six) hours as needed for Wheezing. Rescue    allopurinol (ZYLOPRIM) 100 MG tablet Take 1 tablet (100 mg total) by mouth once daily. Take 100 mg -on Monday and Friday only    amLODIPine (NORVASC) 10 MG tablet Take 10 mg by mouth once daily.    baclofen (LIORESAL) 10 MG tablet TAKE 1 TABLET (10 MG TOTAL) BY MOUTH 3 (THREE) TIMES DAILY AS NEEDED (MUSCLE SPASMS).    busPIRone (BUSPAR) 5 MG Tab Take 5 mg by mouth.    calcium-vitamin D3 (CALCIUM 500 + D) 500 mg(1,250mg) -200 unit per tablet Take 1 tablet by mouth 2 (two) times daily with meals.    diazePAM (VALIUM) 2 MG tablet Take 2 mg by mouth.    DULoxetine (CYMBALTA) 30 MG capsule Take 2 capsules (60 mg total) by mouth once daily.    DULoxetine (CYMBALTA) 30 MG capsule TAKE 1 CAPSULE BY MOUTH EVERY DAY    ergocalciferol (ERGOCALCIFEROL) 50,000 unit Cap TAKE 1 CAPSULE BY MOUTH EVERY 7 DAYS, FOR 12 WEEKS, THEN MONTHLY    furosemide (LASIX) 20 MG tablet Take 1 tablet (20 mg total) by mouth 2 (two) times daily.    HYDROcodone-acetaminophen (NORCO)  mg per tablet Take 1 tablet by mouth 3 (three) times daily as needed.    ondansetron (ZOFRAN-ODT) 4 MG TbDL Dissolve one tablet under the tongue every  6 (six) hours as needed.    sodium bicarbonate 650 MG tablet Take 1 tablet (650 mg total) by mouth 2 (two) times daily.    tiotropium bromide (SPIRIVA RESPIMAT) 2.5 mcg/actuation Mist Inhale 5mcg ( 2 puffs ) by mouth  into the lungs once daily.    traZODone (DESYREL) 50 MG tablet Take 1 tablet (50 mg total) by mouth every evening.    zolpidem (AMBIEN) 5 MG Tab Take 5 mg by mouth once daily.     No current facility-administered medications on file prior to visit.        Review of patient's allergies indicates:   Allergen Reactions    Aspirin      Other reaction(s): hx of ulcers    Tetracycline Swelling     Other reaction(s): Swelling    Penicillins Rash     Other reaction(s): Hives  Other reaction(s): Rash  Other reaction(s): Rash  Other reaction(s): Hives       Objective:     Vital Signs (Most Recent):           There is no height or weight on file to calculate BMI.    Physical exam:  General: no acute distress  Neuro: awake, alert, oriented x3  Resp: Moving air appropriately, breathing even and unlabored  Abd: Soft, non-tender, non-distended. Vertical midline laparotomy incision noted.  Ext: Warm and well perfused    Labs:  Reviewed   GFR 19, stable over past few months    Imaging:  Reviewed       Assessment/Plan:   74 y.o. female with CKD referred here for PD catheter placement consultation.  Had long discussion about technical aspects of placing the catheter. She has had multiple open pelvic operations as well as significant COPD. Discussed with Dr Diggs who thinks she will need either PD or HD soon. Given her surgical history she would not be straightforward catheter placement and may not tolerate abdominal insufflation well, especially if repeated trips to the OR are needed.   Will discuss with patient further.

## 2019-12-05 ENCOUNTER — PES CALL (OUTPATIENT)
Dept: ADMINISTRATIVE | Facility: CLINIC | Age: 74
End: 2019-12-05

## 2019-12-18 ENCOUNTER — OFFICE VISIT (OUTPATIENT)
Dept: PULMONOLOGY | Facility: CLINIC | Age: 74
End: 2019-12-18
Payer: MEDICARE

## 2019-12-18 VITALS
DIASTOLIC BLOOD PRESSURE: 78 MMHG | HEIGHT: 63 IN | OXYGEN SATURATION: 88 % | WEIGHT: 128.06 LBS | SYSTOLIC BLOOD PRESSURE: 120 MMHG | BODY MASS INDEX: 22.69 KG/M2 | HEART RATE: 118 BPM

## 2019-12-18 DIAGNOSIS — J44.9 CHRONIC OBSTRUCTIVE PULMONARY DISEASE, UNSPECIFIED COPD TYPE: Primary | ICD-10-CM

## 2019-12-18 DIAGNOSIS — N18.4 CKD (CHRONIC KIDNEY DISEASE) STAGE 4, GFR 15-29 ML/MIN: ICD-10-CM

## 2019-12-18 DIAGNOSIS — I50.32 CHRONIC DIASTOLIC HEART FAILURE: ICD-10-CM

## 2019-12-18 DIAGNOSIS — J90 PLEURAL EFFUSION: ICD-10-CM

## 2019-12-18 PROCEDURE — 1125F AMNT PAIN NOTED PAIN PRSNT: CPT | Mod: HCNC,S$GLB,, | Performed by: INTERNAL MEDICINE

## 2019-12-18 PROCEDURE — 1159F PR MEDICATION LIST DOCUMENTED IN MEDICAL RECORD: ICD-10-PCS | Mod: HCNC,S$GLB,, | Performed by: INTERNAL MEDICINE

## 2019-12-18 PROCEDURE — 99999 PR PBB SHADOW E&M-EST. PATIENT-LVL IV: ICD-10-PCS | Mod: PBBFAC,HCNC,, | Performed by: INTERNAL MEDICINE

## 2019-12-18 PROCEDURE — 99999 PR PBB SHADOW E&M-EST. PATIENT-LVL IV: CPT | Mod: PBBFAC,HCNC,, | Performed by: INTERNAL MEDICINE

## 2019-12-18 PROCEDURE — 1101F PT FALLS ASSESS-DOCD LE1/YR: CPT | Mod: HCNC,CPTII,S$GLB, | Performed by: INTERNAL MEDICINE

## 2019-12-18 PROCEDURE — 1125F PR PAIN SEVERITY QUANTIFIED, PAIN PRESENT: ICD-10-PCS | Mod: HCNC,S$GLB,, | Performed by: INTERNAL MEDICINE

## 2019-12-18 PROCEDURE — 1159F MED LIST DOCD IN RCRD: CPT | Mod: HCNC,S$GLB,, | Performed by: INTERNAL MEDICINE

## 2019-12-18 PROCEDURE — 3074F PR MOST RECENT SYSTOLIC BLOOD PRESSURE < 130 MM HG: ICD-10-PCS | Mod: HCNC,CPTII,S$GLB, | Performed by: INTERNAL MEDICINE

## 2019-12-18 PROCEDURE — 3074F SYST BP LT 130 MM HG: CPT | Mod: HCNC,CPTII,S$GLB, | Performed by: INTERNAL MEDICINE

## 2019-12-18 PROCEDURE — 3078F DIAST BP <80 MM HG: CPT | Mod: HCNC,CPTII,S$GLB, | Performed by: INTERNAL MEDICINE

## 2019-12-18 PROCEDURE — 1101F PR PT FALLS ASSESS DOC 0-1 FALLS W/OUT INJ PAST YR: ICD-10-PCS | Mod: HCNC,CPTII,S$GLB, | Performed by: INTERNAL MEDICINE

## 2019-12-18 PROCEDURE — 99214 OFFICE O/P EST MOD 30 MIN: CPT | Mod: HCNC,S$GLB,, | Performed by: INTERNAL MEDICINE

## 2019-12-18 PROCEDURE — 99214 PR OFFICE/OUTPT VISIT, EST, LEVL IV, 30-39 MIN: ICD-10-PCS | Mod: HCNC,S$GLB,, | Performed by: INTERNAL MEDICINE

## 2019-12-18 PROCEDURE — 3078F PR MOST RECENT DIASTOLIC BLOOD PRESSURE < 80 MM HG: ICD-10-PCS | Mod: HCNC,CPTII,S$GLB, | Performed by: INTERNAL MEDICINE

## 2019-12-18 RX ORDER — IPRATROPIUM BROMIDE AND ALBUTEROL SULFATE 2.5; .5 MG/3ML; MG/3ML
3 SOLUTION RESPIRATORY (INHALATION) EVERY 6 HOURS PRN
Qty: 3 BOX | Refills: 5 | Status: SHIPPED | OUTPATIENT
Start: 2019-12-18 | End: 2021-10-29 | Stop reason: SDUPTHER

## 2019-12-18 NOTE — LETTER
December 18, 2019      Clemencia Gambino MD  [address]  [address]  [facility] [address]           [facility] - Pulmonary [facility]  [address]  [place] [address]  Phone: [phone]          Patient: Katie Quevedo   MR Number: [id number]   YOB: 1945   Date of Visit: 12/18/2019       Dear Dr. Clemencia Gambino:    Thank you for referring Katie Quevedo to me for evaluation. Attached you will find relevant portions of my assessment and plan of care.    If you have questions, please do not hesitate to call me. I look forward to following Katie Quevedo along with you.    Sincerely,    Giancarlo Rust MD    Enclosure  CC:  No Recipients    If you would like to receive this communication electronically, please contact [email or web address] or [phone] to request more information on [employer] Link access.    For providers and/or their staff who would like to refer a patient to [facility], please contact us through our one-stop-shop provider referral line, [facility], at [phone].    If you feel you have received this communication in error or would no longer like to receive these types of communications, please e-mail [email or web address]

## 2019-12-18 NOTE — PROGRESS NOTES
History & Physical  [facility]        SUBJECTIVE:     Chief Complaint:   COPD    History of Present Illness:  Katie Quevedo is a 74 y.o. female who presents for follow up of COPD. PMH is significant for CKD stage 4, chronic diastolic CHF, exudative pleural effusion, chronic hypoxemic respiratory failure on 2L home O2. She uses spiriva once daily for COPD. The pleural effusion has been sampled twice in the past & was transudative based on light's criteria. Her primary complaint is dyspnea on exertion. She reports MMRC3 symptoms of dyspnea. She has been hospitalized ~4 times for breathing difficulty over the past couple years. She denies chest pain, cough, & hemoptysis.    She is a former smoker who quit 5 years ago.    Review of patient's allergies indicates:   Allergen Reactions    Aspirin      Other reaction(s): hx of ulcers    Tetracycline Swelling     Other reaction(s): Swelling    Penicillins Rash     Other reaction(s): Hives  Other reaction(s): Rash  Other reaction(s): Rash  Other reaction(s): Hives       Past Medical History:   Diagnosis Date    Anemia     Asthma     Bilateral renal cysts     Cataract     Chronic LBP 7/26/2012    Chronic pain     CKD (chronic kidney disease), stage IV     COPD (chronic obstructive pulmonary disease)     Dehydration     Encounter for blood transfusion     HTN (hypertension)     Lumbar spondylosis     Melanoma     of the lip    Metabolic bone disease     Migraines, neuralgic     Osteoporosis     Primary osteoarthritis of both knees     s/p Rt TKA    Pulmonary embolism with infarction     Seizures     Subdeltoid bursitis, L>R. 9/27/2012    Ulcer     Vitamin D deficiency disease      Past Surgical History:   Procedure Laterality Date    BLADDER SUSPENSION      CATARACT EXTRACTION  11/18/13    left eye    CERVICAL LAMINECTOMY      x3, fusion x1    COLONOSCOPY  2009    HYSTERECTOMY      JOINT REPLACEMENT  2001    total right knee     LUMBAR  LAMINECTOMY      x 3, fusion x1    OOPHORECTOMY       Family History   Problem Relation Age of Onset    Arthritis Mother     Stroke Mother     Hypertension Father     Cancer Father     Cataracts Father     Diabetes Maternal Aunt     Hypertension Maternal Grandfather     Heart disease Maternal Grandfather     Heart attack Maternal Grandfather     Cataracts Sister     Glaucoma Cousin      Social History     Socioeconomic History    Marital status:      Spouse name: Not on file    Number of children: Not on file    Years of education: Not on file    Highest education level: Not on file   Occupational History    Not on file   Social Needs    Financial resource strain: Not on file    Food insecurity:     Worry: Not on file     Inability: Not on file    Transportation needs:     Medical: Not on file     Non-medical: Not on file   Tobacco Use    Smoking status: Former Smoker     Types: Cigarettes    Smokeless tobacco: Never Used   Substance and Sexual Activity    Alcohol use: Yes     Alcohol/week: 1.0 standard drinks     Types: 1 Glasses of wine per week     Comment: Rare    Drug use: No    Sexual activity: Never   Lifestyle    Physical activity:     Days per week: Not on file     Minutes per session: Not on file    Stress: Not on file   Relationships    Social connections:     Talks on phone: Not on file     Gets together: Not on file     Attends Jew service: Not on file     Active member of club or organization: Not on file     Attends meetings of clubs or organizations: Not on file     Relationship status: Not on file   Other Topics Concern    Are you pregnant or think you may be? Not Asked    Breast-feeding Not Asked   Social History Narrative    Not on file       Review of Systems:  CV: no syncope  ENT: no sore throat  Resp: per hpi  Eyes: no eye pain  Gastrointestinal: no nausea or vomiting  Integument/Breast: no rash  Musculoskeletal: no arthralgias  Neurological: no  "headaches  Behavioral/Psych: no confusion or depression  Heme: no bleeding      OBJECTIVE:     Vital Signs  Vitals:    12/18/19 1358   BP: 120/78   BP Location: Left arm   Patient Position: Sitting   Pulse: (!) 118  Comment: 109   SpO2: (!) 88%  Comment: 88% without oxygen 96% with oxygen on 2 liters   Weight: 58.1 kg (128 lb 1.4 oz)   Height: 5' 2.5" (1.588 m)     Body mass index is 23.05 kg/m².    Physical Exam:  General: no distress  Eyes:  conjunctivae/corneas clear  Nose: no discharge  Neck: trachea midline with no masses appreciated  Lungs:  normal respiratory effort, no wheezes, no rales, +diffusely diminished breath sounds  Heart: regular rate and rhythm and no murmur  Abdomen: non-distended  Extremities: no cyanosis, no edema, no clubbing  Skin: No rashes or lesions. good skin turgor  Neurologic: alert, oriented, thought content appropriate    Laboratory:  Lab Results   Component Value Date    WBC 7.25 09/15/2019    HGB 10.3 (L) 09/15/2019    HCT 33.4 (L) 09/15/2019    MCV 87 09/15/2019     09/15/2019       Chest Imaging, My Impression:   CT chest 6/2019: large left pleural effusion, +pericardial effusion, +small mediastinal lymph nodes, no overt lung masses or nodules are apparent    Diagnostic Results:  Echo: Reviewed   PFT 2/2018: + obstruction, FEV1 1.20 (55% predicted), +air trapping, +hyperinflation, DLCO 47% of predicted    ASSESSMENT/PLAN:     Problem Noted   Ckd (Chronic Kidney Disease) Stage 4, Gfr 15-29 Ml/Min 12/18/2019    Noted.     Copd (Chronic Obstructive Pulmonary Disease) 12/18/2019    Pt has MMRC 3 symptoms of dyspnea & multiple hospitalizations for difficulty breathing. Recommend changing spiriva inhaler to trelegy. Continue prn nebulizer treatments. Recommended pulmonary rehab though the commute may be too much for her (she lives in [place]). She will get back to me. I suggested regular aerobic exercise as the next best alternative to pulmonary rehab.     Chronic Diastolic " Heart Failure 10/30/2019    She takes lasix 20mg BID. Continue lasix & follow up with cardiology.     Pleural Effusion     Transudate based on light's criteria. Repeat CXR to follow up.           Giancarlo Rust MD  [facility]

## 2019-12-23 DIAGNOSIS — M54.2 CHRONIC NECK PAIN: ICD-10-CM

## 2019-12-23 DIAGNOSIS — M54.41 CHRONIC MIDLINE LOW BACK PAIN WITH RIGHT-SIDED SCIATICA: ICD-10-CM

## 2019-12-23 DIAGNOSIS — M16.0 PRIMARY OSTEOARTHRITIS OF BOTH HIPS: ICD-10-CM

## 2019-12-23 DIAGNOSIS — G89.29 CHRONIC NECK PAIN: ICD-10-CM

## 2019-12-23 DIAGNOSIS — G89.29 CHRONIC MIDLINE LOW BACK PAIN WITH RIGHT-SIDED SCIATICA: ICD-10-CM

## 2019-12-23 NOTE — TELEPHONE ENCOUNTER
Last Rx refill-----11/22/19  Last office visit--11/04/19  Next office visit--03/16/20        ----- Message from Camilo Devine sent at 12/23/2019  4:02 PM CST -----  Rx Refill/Request     Is this a Refill or New Rx: Refill   Rx Name and Strength: HYDROcodone-acetaminophen (NORCO)  mg per tablet    Preferred Pharmacy with phone number: see below  Communication Preference: [phone]  Additional Information:     [facility]/pharmacy #[id number] - [place] - [address] [facility]  [address] [place] [address]  Phone: [phone] Fax: [phone]

## 2019-12-26 DIAGNOSIS — M54.2 CHRONIC NECK PAIN: ICD-10-CM

## 2019-12-26 DIAGNOSIS — G89.29 CHRONIC MIDLINE LOW BACK PAIN WITH RIGHT-SIDED SCIATICA: ICD-10-CM

## 2019-12-26 DIAGNOSIS — M16.0 PRIMARY OSTEOARTHRITIS OF BOTH HIPS: ICD-10-CM

## 2019-12-26 DIAGNOSIS — M54.41 CHRONIC MIDLINE LOW BACK PAIN WITH RIGHT-SIDED SCIATICA: ICD-10-CM

## 2019-12-26 DIAGNOSIS — G89.29 CHRONIC NECK PAIN: ICD-10-CM

## 2019-12-26 NOTE — TELEPHONE ENCOUNTER
Last Rx refill-----11/22/19  Last office visit--11/04/19  Next office visit--03/16/20        ----- Message from Camilo Devine sent at 12/26/2019  8:46 AM CST -----  Rx Refill/Request     Is this a Refill or New Rx: Refill   Rx Name and Strength: HYDROcodone-acetaminophen (NORCO)  mg per tablet    Preferred Pharmacy with phone number: see below  Communication Preference: [phone]  Additional Information:      [facility]/pharmacy #[id number] - [place] - [address] [facility]  [address] [place] [address]  Phone: [phone] Fax: [phone]

## 2019-12-27 DIAGNOSIS — M54.2 CHRONIC NECK PAIN: ICD-10-CM

## 2019-12-27 DIAGNOSIS — G89.29 CHRONIC NECK PAIN: ICD-10-CM

## 2019-12-27 DIAGNOSIS — M54.41 CHRONIC MIDLINE LOW BACK PAIN WITH RIGHT-SIDED SCIATICA: ICD-10-CM

## 2019-12-27 DIAGNOSIS — M16.0 PRIMARY OSTEOARTHRITIS OF BOTH HIPS: ICD-10-CM

## 2019-12-27 DIAGNOSIS — G89.29 CHRONIC MIDLINE LOW BACK PAIN WITH RIGHT-SIDED SCIATICA: ICD-10-CM

## 2019-12-27 RX ORDER — HYDROCODONE BITARTRATE AND ACETAMINOPHEN 10; 325 MG/1; MG/1
1 TABLET ORAL 3 TIMES DAILY PRN
Qty: 90 TABLET | Refills: 0 | Status: SHIPPED | OUTPATIENT
Start: 2019-12-27 | End: 2020-01-26

## 2019-12-28 RX ORDER — DULOXETIN HYDROCHLORIDE 30 MG/1
CAPSULE, DELAYED RELEASE ORAL
Qty: 90 CAPSULE | Refills: 1 | Status: ON HOLD | OUTPATIENT
Start: 2019-12-28 | End: 2020-02-03

## 2019-12-28 RX ORDER — HYDROCODONE BITARTRATE AND ACETAMINOPHEN 10; 325 MG/1; MG/1
1 TABLET ORAL 3 TIMES DAILY PRN
Qty: 90 TABLET | Refills: 0 | Status: SHIPPED | OUTPATIENT
Start: 2019-12-28 | End: 2020-01-24 | Stop reason: SDUPTHER

## 2020-01-07 ENCOUNTER — PES CALL (OUTPATIENT)
Dept: ADMINISTRATIVE | Facility: CLINIC | Age: 75
End: 2020-01-07

## 2020-01-14 ENCOUNTER — HOSPITAL ENCOUNTER (OUTPATIENT)
Facility: HOSPITAL | Age: 75
Discharge: HOME | End: 2020-01-16
Attending: EMERGENCY MEDICINE | Admitting: FAMILY MEDICINE
Payer: MEDICARE

## 2020-01-14 ENCOUNTER — OFFICE VISIT (OUTPATIENT)
Dept: PRIMARY CARE CLINIC | Facility: CLINIC | Age: 75
End: 2020-01-14
Payer: MEDICARE

## 2020-01-14 ENCOUNTER — TELEPHONE (OUTPATIENT)
Dept: PRIMARY CARE CLINIC | Facility: CLINIC | Age: 75
End: 2020-01-14

## 2020-01-14 VITALS
SYSTOLIC BLOOD PRESSURE: 115 MMHG | HEART RATE: 92 BPM | WEIGHT: 132.69 LBS | BODY MASS INDEX: 23.89 KG/M2 | TEMPERATURE: 98 F | OXYGEN SATURATION: 96 % | DIASTOLIC BLOOD PRESSURE: 67 MMHG

## 2020-01-14 DIAGNOSIS — I31.39 PERICARDIAL EFFUSION: ICD-10-CM

## 2020-01-14 DIAGNOSIS — R10.11 RIGHT UPPER QUADRANT ABDOMINAL PAIN: ICD-10-CM

## 2020-01-14 DIAGNOSIS — G89.29 CHRONIC MIDLINE LOW BACK PAIN WITH RIGHT-SIDED SCIATICA: ICD-10-CM

## 2020-01-14 DIAGNOSIS — J44.9 CHRONIC OBSTRUCTIVE PULMONARY DISEASE, UNSPECIFIED COPD TYPE: ICD-10-CM

## 2020-01-14 DIAGNOSIS — R11.2 NON-INTRACTABLE VOMITING WITH NAUSEA: ICD-10-CM

## 2020-01-14 DIAGNOSIS — N18.9 ACUTE KIDNEY INJURY SUPERIMPOSED ON CKD: ICD-10-CM

## 2020-01-14 DIAGNOSIS — N17.9 AKI (ACUTE KIDNEY INJURY): ICD-10-CM

## 2020-01-14 DIAGNOSIS — Z99.81 CHRONIC RESPIRATORY FAILURE WITH HYPOXIA, ON HOME O2 THERAPY: ICD-10-CM

## 2020-01-14 DIAGNOSIS — J90 PLEURAL EFFUSION: ICD-10-CM

## 2020-01-14 DIAGNOSIS — N18.9 ACUTE RENAL FAILURE SUPERIMPOSED ON CHRONIC KIDNEY DISEASE, UNSPECIFIED CKD STAGE, UNSPECIFIED ACUTE RENAL FAILURE TYPE: Primary | ICD-10-CM

## 2020-01-14 DIAGNOSIS — N18.9 ACUTE KIDNEY INJURY SUPERIMPOSED ON CKD: Primary | ICD-10-CM

## 2020-01-14 DIAGNOSIS — N17.9 ACUTE KIDNEY INJURY SUPERIMPOSED ON CKD: Primary | ICD-10-CM

## 2020-01-14 DIAGNOSIS — N17.9 ACUTE RENAL FAILURE SUPERIMPOSED ON CHRONIC KIDNEY DISEASE, UNSPECIFIED CKD STAGE, UNSPECIFIED ACUTE RENAL FAILURE TYPE: Primary | ICD-10-CM

## 2020-01-14 DIAGNOSIS — R53.83 FATIGUE: ICD-10-CM

## 2020-01-14 DIAGNOSIS — N18.4 CKD (CHRONIC KIDNEY DISEASE) STAGE 4, GFR 15-29 ML/MIN: ICD-10-CM

## 2020-01-14 DIAGNOSIS — N17.9 ACUTE KIDNEY INJURY SUPERIMPOSED ON CKD: ICD-10-CM

## 2020-01-14 DIAGNOSIS — M54.41 CHRONIC MIDLINE LOW BACK PAIN WITH RIGHT-SIDED SCIATICA: ICD-10-CM

## 2020-01-14 DIAGNOSIS — C43.0 MALIGNANT MELANOMA OF LIP: ICD-10-CM

## 2020-01-14 DIAGNOSIS — J96.11 CHRONIC RESPIRATORY FAILURE WITH HYPOXIA, ON HOME O2 THERAPY: ICD-10-CM

## 2020-01-14 LAB
ALBUMIN SERPL BCP-MCNC: 4 G/DL (ref 3.5–5.2)
ALP SERPL-CCNC: 161 U/L (ref 55–135)
ALT SERPL W/O P-5'-P-CCNC: 10 U/L (ref 10–44)
ANION GAP SERPL CALC-SCNC: 13 MMOL/L (ref 8–16)
AST SERPL-CCNC: 24 U/L (ref 10–40)
BASOPHILS # BLD AUTO: 0.05 K/UL (ref 0–0.2)
BASOPHILS NFR BLD: 0.6 % (ref 0–1.9)
BILIRUB SERPL-MCNC: 0.3 MG/DL (ref 0.1–1)
BILIRUB UR QL STRIP: NEGATIVE
BUN SERPL-MCNC: 45 MG/DL (ref 8–23)
CALCIUM SERPL-MCNC: 9.7 MG/DL (ref 8.7–10.5)
CHLORIDE SERPL-SCNC: 99 MMOL/L (ref 95–110)
CLARITY UR: CLEAR
CO2 SERPL-SCNC: 22 MMOL/L (ref 23–29)
COLOR UR: YELLOW
CREAT SERPL-MCNC: 3.8 MG/DL (ref 0.5–1.4)
DIFFERENTIAL METHOD: ABNORMAL
EOSINOPHIL # BLD AUTO: 0.5 K/UL (ref 0–0.5)
EOSINOPHIL NFR BLD: 6 % (ref 0–8)
ERYTHROCYTE [DISTWIDTH] IN BLOOD BY AUTOMATED COUNT: 15 % (ref 11.5–14.5)
EST. GFR  (AFRICAN AMERICAN): 13 ML/MIN/1.73 M^2
EST. GFR  (NON AFRICAN AMERICAN): 11 ML/MIN/1.73 M^2
GLUCOSE SERPL-MCNC: 97 MG/DL (ref 70–110)
GLUCOSE UR QL STRIP: NEGATIVE
HCT VFR BLD AUTO: 32 % (ref 37–48.5)
HGB BLD-MCNC: 10 G/DL (ref 12–16)
HGB UR QL STRIP: NEGATIVE
KETONES UR QL STRIP: NEGATIVE
LEUKOCYTE ESTERASE UR QL STRIP: NEGATIVE
LYMPHOCYTES # BLD AUTO: 1.5 K/UL (ref 1–4.8)
LYMPHOCYTES NFR BLD: 18 % (ref 18–48)
MCH RBC QN AUTO: 29.6 PG (ref 27–31)
MCHC RBC AUTO-ENTMCNC: 31.3 G/DL (ref 32–36)
MCV RBC AUTO: 95 FL (ref 82–98)
MONOCYTES # BLD AUTO: 0.6 K/UL (ref 0.3–1)
MONOCYTES NFR BLD: 7.2 % (ref 4–15)
NEUTROPHILS # BLD AUTO: 5.7 K/UL (ref 1.8–7.7)
NEUTROPHILS NFR BLD: 68.2 % (ref 38–73)
NITRITE UR QL STRIP: NEGATIVE
PH UR STRIP: 6 [PH] (ref 5–8)
PLATELET # BLD AUTO: 291 K/UL (ref 150–350)
PMV BLD AUTO: 9.7 FL (ref 9.2–12.9)
POTASSIUM SERPL-SCNC: 4.7 MMOL/L (ref 3.5–5.1)
PROT SERPL-MCNC: 8.4 G/DL (ref 6–8.4)
PROT UR QL STRIP: NEGATIVE
RBC # BLD AUTO: 3.38 M/UL (ref 4–5.4)
SODIUM SERPL-SCNC: 134 MMOL/L (ref 136–145)
SP GR UR STRIP: 1.01 (ref 1–1.03)
URN SPEC COLLECT METH UR: NORMAL
UROBILINOGEN UR STRIP-ACNC: NEGATIVE EU/DL
WBC # BLD AUTO: 8.34 K/UL (ref 3.9–12.7)

## 2020-01-14 PROCEDURE — 1159F PR MEDICATION LIST DOCUMENTED IN MEDICAL RECORD: ICD-10-PCS | Mod: HCNC,S$GLB,, | Performed by: INTERNAL MEDICINE

## 2020-01-14 PROCEDURE — 1101F PR PT FALLS ASSESS DOC 0-1 FALLS W/OUT INJ PAST YR: ICD-10-PCS | Mod: HCNC,CPTII,S$GLB, | Performed by: INTERNAL MEDICINE

## 2020-01-14 PROCEDURE — 96374 THER/PROPH/DIAG INJ IV PUSH: CPT

## 2020-01-14 PROCEDURE — 3078F PR MOST RECENT DIASTOLIC BLOOD PRESSURE < 80 MM HG: ICD-10-PCS | Mod: HCNC,CPTII,S$GLB, | Performed by: INTERNAL MEDICINE

## 2020-01-14 PROCEDURE — 85025 COMPLETE CBC W/AUTO DIFF WBC: CPT | Mod: HCNC

## 2020-01-14 PROCEDURE — 3078F DIAST BP <80 MM HG: CPT | Mod: HCNC,CPTII,S$GLB, | Performed by: INTERNAL MEDICINE

## 2020-01-14 PROCEDURE — 63600175 PHARM REV CODE 636 W HCPCS: Mod: HCNC | Performed by: NURSE PRACTITIONER

## 2020-01-14 PROCEDURE — 99214 OFFICE O/P EST MOD 30 MIN: CPT | Mod: HCNC,S$GLB,, | Performed by: INTERNAL MEDICINE

## 2020-01-14 PROCEDURE — 99499 RISK ADDL DX/OHS AUDIT: ICD-10-PCS | Mod: HCNC,S$GLB,, | Performed by: INTERNAL MEDICINE

## 2020-01-14 PROCEDURE — G0378 HOSPITAL OBSERVATION PER HR: HCPCS | Mod: HCNC

## 2020-01-14 PROCEDURE — 1125F AMNT PAIN NOTED PAIN PRSNT: CPT | Mod: HCNC,S$GLB,, | Performed by: INTERNAL MEDICINE

## 2020-01-14 PROCEDURE — 3074F SYST BP LT 130 MM HG: CPT | Mod: HCNC,CPTII,S$GLB, | Performed by: INTERNAL MEDICINE

## 2020-01-14 PROCEDURE — 1125F PR PAIN SEVERITY QUANTIFIED, PAIN PRESENT: ICD-10-PCS | Mod: HCNC,S$GLB,, | Performed by: INTERNAL MEDICINE

## 2020-01-14 PROCEDURE — 1159F MED LIST DOCD IN RCRD: CPT | Mod: HCNC,S$GLB,, | Performed by: INTERNAL MEDICINE

## 2020-01-14 PROCEDURE — 96361 HYDRATE IV INFUSION ADD-ON: CPT

## 2020-01-14 PROCEDURE — 80053 COMPREHEN METABOLIC PANEL: CPT | Mod: 91,HCNC

## 2020-01-14 PROCEDURE — 94640 AIRWAY INHALATION TREATMENT: CPT | Mod: HCNC

## 2020-01-14 PROCEDURE — 99999 PR PBB SHADOW E&M-EST. PATIENT-LVL III: CPT | Mod: PBBFAC,HCNC,, | Performed by: INTERNAL MEDICINE

## 2020-01-14 PROCEDURE — 81003 URINALYSIS AUTO W/O SCOPE: CPT | Mod: HCNC

## 2020-01-14 PROCEDURE — 99999 PR PBB SHADOW E&M-EST. PATIENT-LVL III: ICD-10-PCS | Mod: PBBFAC,HCNC,, | Performed by: INTERNAL MEDICINE

## 2020-01-14 PROCEDURE — 3074F PR MOST RECENT SYSTOLIC BLOOD PRESSURE < 130 MM HG: ICD-10-PCS | Mod: HCNC,CPTII,S$GLB, | Performed by: INTERNAL MEDICINE

## 2020-01-14 PROCEDURE — 25000003 PHARM REV CODE 250: Mod: HCNC | Performed by: NURSE PRACTITIONER

## 2020-01-14 PROCEDURE — 99285 EMERGENCY DEPT VISIT HI MDM: CPT | Mod: 25,HCNC

## 2020-01-14 PROCEDURE — 99499 UNLISTED E&M SERVICE: CPT | Mod: HCNC,S$GLB,, | Performed by: INTERNAL MEDICINE

## 2020-01-14 PROCEDURE — 63600175 PHARM REV CODE 636 W HCPCS: Mod: HCNC | Performed by: EMERGENCY MEDICINE

## 2020-01-14 PROCEDURE — 99214 PR OFFICE/OUTPT VISIT, EST, LEVL IV, 30-39 MIN: ICD-10-PCS | Mod: HCNC,S$GLB,, | Performed by: INTERNAL MEDICINE

## 2020-01-14 PROCEDURE — 1101F PT FALLS ASSESS-DOCD LE1/YR: CPT | Mod: HCNC,CPTII,S$GLB, | Performed by: INTERNAL MEDICINE

## 2020-01-14 PROCEDURE — 25000242 PHARM REV CODE 250 ALT 637 W/ HCPCS: Mod: HCNC | Performed by: NURSE PRACTITIONER

## 2020-01-14 RX ORDER — IPRATROPIUM BROMIDE AND ALBUTEROL SULFATE 2.5; .5 MG/3ML; MG/3ML
3 SOLUTION RESPIRATORY (INHALATION) EVERY 6 HOURS PRN
Status: DISCONTINUED | OUTPATIENT
Start: 2020-01-14 | End: 2020-01-15

## 2020-01-14 RX ORDER — FERROUS SULFATE, DRIED 160(50) MG
1 TABLET, EXTENDED RELEASE ORAL 2 TIMES DAILY WITH MEALS
Status: DISCONTINUED | OUTPATIENT
Start: 2020-01-15 | End: 2020-01-15

## 2020-01-14 RX ORDER — HYDROCODONE BITARTRATE AND ACETAMINOPHEN 10; 325 MG/1; MG/1
1 TABLET ORAL 3 TIMES DAILY PRN
Status: DISCONTINUED | OUTPATIENT
Start: 2020-01-14 | End: 2020-01-16 | Stop reason: HOSPADM

## 2020-01-14 RX ORDER — ENOXAPARIN SODIUM 100 MG/ML
30 INJECTION SUBCUTANEOUS EVERY 24 HOURS
Status: DISCONTINUED | OUTPATIENT
Start: 2020-01-15 | End: 2020-01-16 | Stop reason: HOSPADM

## 2020-01-14 RX ORDER — SODIUM CHLORIDE, SODIUM LACTATE, POTASSIUM CHLORIDE, CALCIUM CHLORIDE 600; 310; 30; 20 MG/100ML; MG/100ML; MG/100ML; MG/100ML
INJECTION, SOLUTION INTRAVENOUS CONTINUOUS
Status: ACTIVE | OUTPATIENT
Start: 2020-01-14 | End: 2020-01-15

## 2020-01-14 RX ORDER — ONDANSETRON 2 MG/ML
4 INJECTION INTRAMUSCULAR; INTRAVENOUS EVERY 6 HOURS PRN
Status: DISCONTINUED | OUTPATIENT
Start: 2020-01-14 | End: 2020-01-16 | Stop reason: HOSPADM

## 2020-01-14 RX ORDER — TRAZODONE HYDROCHLORIDE 50 MG/1
50 TABLET ORAL NIGHTLY
Status: DISCONTINUED | OUTPATIENT
Start: 2020-01-14 | End: 2020-01-15

## 2020-01-14 RX ORDER — BACLOFEN 5 MG/1
10 TABLET ORAL 3 TIMES DAILY PRN
Status: DISCONTINUED | OUTPATIENT
Start: 2020-01-14 | End: 2020-01-15

## 2020-01-14 RX ORDER — TIOTROPIUM BROMIDE INHALATION SPRAY 3.12 UG/1
SPRAY, METERED RESPIRATORY (INHALATION)
COMMUNITY
Start: 2019-12-29 | End: 2020-01-14 | Stop reason: ALTCHOICE

## 2020-01-14 RX ORDER — SODIUM CHLORIDE 0.9 % (FLUSH) 0.9 %
10 SYRINGE (ML) INJECTION
Status: DISCONTINUED | OUTPATIENT
Start: 2020-01-14 | End: 2020-01-16 | Stop reason: HOSPADM

## 2020-01-14 RX ORDER — FUROSEMIDE 20 MG/1
20 TABLET ORAL 2 TIMES DAILY
Qty: 60 TABLET | Refills: 11 | Status: ON HOLD | OUTPATIENT
Start: 2020-01-14 | End: 2020-02-21 | Stop reason: HOSPADM

## 2020-01-14 RX ORDER — ALLOPURINOL 100 MG/1
100 TABLET ORAL DAILY
Status: DISCONTINUED | OUTPATIENT
Start: 2020-01-15 | End: 2020-01-16 | Stop reason: HOSPADM

## 2020-01-14 RX ORDER — SODIUM CHLORIDE, SODIUM LACTATE, POTASSIUM CHLORIDE, CALCIUM CHLORIDE 600; 310; 30; 20 MG/100ML; MG/100ML; MG/100ML; MG/100ML
1000 INJECTION, SOLUTION INTRAVENOUS
Status: COMPLETED | OUTPATIENT
Start: 2020-01-14 | End: 2020-01-14

## 2020-01-14 RX ORDER — AMLODIPINE BESYLATE 5 MG/1
10 TABLET ORAL DAILY
Status: DISCONTINUED | OUTPATIENT
Start: 2020-01-15 | End: 2020-01-15

## 2020-01-14 RX ORDER — ONDANSETRON 4 MG/1
4 TABLET, ORALLY DISINTEGRATING ORAL EVERY 6 HOURS PRN
Status: DISCONTINUED | OUTPATIENT
Start: 2020-01-14 | End: 2020-01-16 | Stop reason: HOSPADM

## 2020-01-14 RX ORDER — ACETAMINOPHEN 325 MG/1
650 TABLET ORAL EVERY 4 HOURS PRN
Status: DISCONTINUED | OUTPATIENT
Start: 2020-01-14 | End: 2020-01-16 | Stop reason: HOSPADM

## 2020-01-14 RX ADMIN — SODIUM CHLORIDE, POTASSIUM CHLORIDE, SODIUM LACTATE AND CALCIUM CHLORIDE: 600; 310; 30; 20 INJECTION, SOLUTION INTRAVENOUS at 11:01

## 2020-01-14 RX ADMIN — HYDROCODONE BITARTRATE AND ACETAMINOPHEN 1 TABLET: 10; 325 TABLET ORAL at 11:01

## 2020-01-14 RX ADMIN — ONDANSETRON 4 MG: 2 INJECTION, SOLUTION INTRAMUSCULAR; INTRAVENOUS at 11:01

## 2020-01-14 RX ADMIN — SODIUM CHLORIDE, POTASSIUM CHLORIDE, SODIUM LACTATE AND CALCIUM CHLORIDE 1000 ML: 600; 310; 30; 20 INJECTION, SOLUTION INTRAVENOUS at 08:01

## 2020-01-14 RX ADMIN — IPRATROPIUM BROMIDE AND ALBUTEROL SULFATE 3 ML: .5; 3 SOLUTION RESPIRATORY (INHALATION) at 09:01

## 2020-01-14 NOTE — TELEPHONE ENCOUNTER
Spoke to patient and notified that per Dr. Gambino she needs to go to the ED for worsening renal function. Patient verbalized understanding and stated that after her  gets back from the store she will go.

## 2020-01-14 NOTE — PROGRESS NOTES
"Subjective:       Patient ID: Katie Quevedo is a 74 y.o. female.    Chief Complaint: Hospital Follow Up    HPI:  Returns to [facility] for continued hospital follow up.  Recently hospitalized for acute on chronic respiratory failure associated with recurrent left pleural effusion.  Patient has advanced COPD and is on home supplemental oxygen at baseline.   Patient with signs and symptoms of heart failure on this hospital admission, and responded well to diuresis.   She has not previously been evaluated for heart failure.  She responded well to diureses and supportive care and was discharged to home on Lasix 20 mg twice daily.    Prior to this admission, recurrent pleural effusion was thought to be malignant and she was awaiting VATS with pleural bx with U CT surgery team.  This plan has been abandoned due to suspicion of heart failure being the etiology of recurrent pleural effusion.    Since our last visit she has established new Pulmonary Care with [facility]- Dr Rust.  Spiriva changed to Trelegy.   She was unable to fill this due to cost and she remains on Spiriva presently.  I have sent it to [facility] outpatient pharmacy for review and cost will be $47 monthly for Trelegy.  She will  this Rx today.   She has started Pul Rehab at [facility].    Seen by Gen Surgery team, Dr Madrid for PD catheter placement evaluation; referred by her Nephrologist, Dr Diggs.    Due to prior ABD surgeries and risk of anesthesia with her impaired pulmonary status, she is being referred to vascular surgery for consideration of vascular HD access.  She will see vascular later this month- she is unsure of date or physicians name.     Patient accompanied today by her .  She is ambulatory with an electric scooter.   Resp status is at baseline.  She reports recent onset N/V.  Had 3 episodes of vomiting this past Sat (1/11/10) and was unable to tolerate oral intake the entire day.  Vomit was "brown" tinged.   Resolved " spontaneously and she resumed food/liquid.  However, she has continued with intermittent nausea following meals.   Also reports RUQ pain.     Review of Systems   Constitutional: Negative for chills and fever.   HENT: Negative for hearing loss.    Eyes: Negative for blurred vision.   Respiratory: Positive for shortness of breath. Negative for cough, sputum production and wheezing.    Cardiovascular: Positive for orthopnea. Negative for chest pain and leg swelling.   Gastrointestinal: Positive for abdominal pain (right upper quadrant), nausea and vomiting. Negative for blood in stool, constipation and diarrhea.   Genitourinary: Negative for dysuria.   Musculoskeletal: Negative for falls, joint pain and myalgias.   Skin: Negative for rash.   Neurological: Negative for dizziness, focal weakness and headaches.   Endo/Heme/Allergies: Bruises/bleeds easily.   Psychiatric/Behavioral: Negative for depression, memory loss and suicidal ideas. The patient does not have insomnia.            Objective:      Vital Signs:  Vitals:    01/14/20 1340   BP: 115/67   Pulse: 92   Temp: 97.5 °F (36.4 °C)     Wt Readings from Last 1 Encounters:   01/14/20 1340 60.2 kg (132 lb 11.5 oz)     Body mass index is 23.89 kg/m².   SpO2 Readings from Last 1 Encounters:   01/14/20 96%       Physical Exam   Constitutional: She is oriented to person, place, and time. No distress.   +thin, frail    HENT:   Head: Normocephalic.   Eyes: Pupils are equal, round, and reactive to light.   Neck: Normal range of motion. No JVD present.   Cardiovascular: Normal rate and regular rhythm.   Murmur heard.  Pulmonary/Chest: No respiratory distress. She has no wheezes.   + poor inspiratory effort, diminished breath sounds bilaterally    Abdominal: Soft. Bowel sounds are normal. She exhibits no distension. There is tenderness (minimal RUQ tenderness to deep palpation ). There is no rebound and no guarding.   Musculoskeletal: She exhibits no edema.   Neurological: She  is alert and oriented to person, place, and time.   Skin: Skin is warm and dry. There is pallor.   Psychiatric: She has a normal mood and affect.           Assessment:       1. Acute kidney injury superimposed on CKD    2. Non-intractable vomiting with nausea    3. Right upper quadrant abdominal pain    4. Chronic obstructive pulmonary disease, unspecified COPD type    5. Chronic respiratory failure with hypoxia, on home O2 therapy        Plan:       Diagnoses and all orders for this visit:    Acute kidney injury superimposed on CKD  - labs today show worsening renal function and mild hyperkalemia in setting of several days N/V  - to ED for evaluation    Non-intractable vomiting with nausea  Right upper quadrant abdominal pain  - unclear etiology  -     Comprehensive metabolic panel; Future  -     Lipase; Future  -     US Abdomen Limited; Future    Chronic obstructive pulmonary disease, unspecified COPD type  Chronic respiratory failure with hypoxia, on home O2 therapy  -     fluticasone-umeclidin-vilanter (TRELEGY ELLIPTA) 100-62.5-25 mcg DsDv; Inhale 1 puff by mouth into the lungs once daily.      Other orders  -     furosemide (LASIX) 20 MG tablet; Take 1 tablet (20 mg total) by mouth 2 (two) times daily.        Labs returned after patient left office.  She is being contacted and advised to go to ED for evaluation due to renal failure in setting of N/V.   I will see patient again in [facility] 2/4/20; may adjust based on outcome of ED visit.       Scheduled Follow-up :  Future Appointments   Date Time Provider Department Center   1/20/2020  3:30 PM [facility] US2 [facility] USOUNDO [facility]   1/28/2020 11:45 AM Aura Diggs MD [facility]   1/29/2020  2:40 PM Jason Anderson MD [facility] CARDIO [facility]   2/4/2020  3:00 PM Clemencia Gambino MD [facility] IMPRI [facility]   3/16/2020  2:40 PM Pushpa Mcmahon MD [facility]       Post Visit Medication List:     Medication List           Accurate as of  January 14, 2020  4:21 PM. If you have any questions, ask your nurse or doctor.               CONTINUE taking these medications    albuterol 2.5 mg /3 mL (0.083 %) nebulizer solution  Commonly known as:  PROVENTIL  Take 3 mLs (2.5 mg total) by nebulization every 6 (six) hours as needed for Wheezing. Rescue     albuterol-ipratropium 2.5 mg-0.5 mg/3 mL nebulizer solution  Commonly known as:  DUO-NEB  Take 3 mLs by nebulization every 6 (six) hours as needed for Shortness of Breath. Rescue     allopurinol 100 MG tablet  Commonly known as:  ZYLOPRIM  Take 1 tablet (100 mg total) by mouth once daily. Take 100 mg -on Monday and Friday only     amLODIPine 10 MG tablet  Commonly known as:  NORVASC     baclofen 10 MG tablet  Commonly known as:  LIORESAL  TAKE 1 TABLET (10 MG TOTAL) BY MOUTH 3 (THREE) TIMES DAILY AS NEEDED (MUSCLE SPASMS).     busPIRone 5 MG Tab  Commonly known as:  BUSPAR     Calcium 500 + D 500 mg(1,250mg) -200 unit per tablet  Generic drug:  calcium-vitamin D3     diazePAM 2 MG tablet  Commonly known as:  VALIUM     DULoxetine 30 MG capsule  Commonly known as:  CYMBALTA  TAKE 1 CAPSULE BY MOUTH EVERY DAY     ergocalciferol 50,000 unit Cap  Commonly known as:  ERGOCALCIFEROL     furosemide 20 MG tablet  Commonly known as:  LASIX  Take 1 tablet (20 mg total) by mouth 2 (two) times daily.     * HYDROcodone-acetaminophen  mg per tablet  Commonly known as:  NORCO  Take 1 tablet by mouth 3 (three) times daily as needed.     * HYDROcodone-acetaminophen  mg per tablet  Commonly known as:  NORCO  Take 1 tablet by mouth 3 (three) times daily as needed.     ondansetron 4 MG Tbdl  Commonly known as:  ZOFRAN-ODT  Dissolve one tablet under the tongue every 6 (six) hours as needed.     sodium bicarbonate 650 MG tablet  Take 1 tablet (650 mg total) by mouth 2 (two) times daily.     traZODone 50 MG tablet  Commonly known as:  DESYREL  Take 1 tablet (50 mg total) by mouth every evening.     Kristofer Vizcainota  100-62.5-25 mcg Dsdv  Generic drug:  fluticasone-umeclidin-vilanter  Inhale 1 puff by mouth into the lungs once daily.     zolpidem 5 MG Tab  Commonly known as:  AMBIEN         * This list has 2 medication(s) that are the same as other medications prescribed for you. Read the directions carefully, and ask your doctor or other care provider to review them with you.            STOP taking these medications    Spiriva Respimat 2.5 mcg/actuation Mist  Generic drug:  tiotropium bromide  Stopped by:  Clemencia Gambino MD           Where to Get Your Medications      These medications were sent to [facility]  [address], [place] [address]    Hours:  Mon-Fri, 8a-5:30p Phone:  [phone]   · furosemide 20 MG tablet  · Trelegy Ellipta 100-62.5-25 mcg Dsdv

## 2020-01-14 NOTE — ED PROVIDER NOTES
Sort note: Katie Quevedo nontoxic/afebrile 74 y.o.  presented to the ED for doctor referral.  Pt notified today that her renal function has worsened.     Patient seen and medically screened by Nurse practitioner in Sort process due to ED crowding.  Appropriate tests and/or medications ordered.  Care transferred to an alternate provider when patient was placed in an Exam Room from the [facility] for physical exam, additional orders, and disposition. 5:48 PM. ARNULFO Potter  01/14/20 1749

## 2020-01-15 LAB
ANION GAP SERPL CALC-SCNC: 12 MMOL/L (ref 8–16)
BASOPHILS # BLD AUTO: 0.03 K/UL (ref 0–0.2)
BASOPHILS NFR BLD: 0.4 % (ref 0–1.9)
BUN SERPL-MCNC: 41 MG/DL (ref 8–23)
CALCIUM SERPL-MCNC: 9.5 MG/DL (ref 8.7–10.5)
CHLORIDE SERPL-SCNC: 99 MMOL/L (ref 95–110)
CO2 SERPL-SCNC: 22 MMOL/L (ref 23–29)
CREAT SERPL-MCNC: 3.3 MG/DL (ref 0.5–1.4)
DIFFERENTIAL METHOD: ABNORMAL
EOSINOPHIL # BLD AUTO: 0.6 K/UL (ref 0–0.5)
EOSINOPHIL NFR BLD: 7.4 % (ref 0–8)
ERYTHROCYTE [DISTWIDTH] IN BLOOD BY AUTOMATED COUNT: 14.9 % (ref 11.5–14.5)
EST. GFR  (AFRICAN AMERICAN): 15 ML/MIN/1.73 M^2
EST. GFR  (NON AFRICAN AMERICAN): 13 ML/MIN/1.73 M^2
GLUCOSE SERPL-MCNC: 74 MG/DL (ref 70–110)
HCT VFR BLD AUTO: 29.8 % (ref 37–48.5)
HGB BLD-MCNC: 9.3 G/DL (ref 12–16)
LYMPHOCYTES # BLD AUTO: 1.7 K/UL (ref 1–4.8)
LYMPHOCYTES NFR BLD: 22.7 % (ref 18–48)
MAGNESIUM SERPL-MCNC: 2.3 MG/DL (ref 1.6–2.6)
MCH RBC QN AUTO: 29.1 PG (ref 27–31)
MCHC RBC AUTO-ENTMCNC: 31.2 G/DL (ref 32–36)
MCV RBC AUTO: 93 FL (ref 82–98)
MONOCYTES # BLD AUTO: 0.7 K/UL (ref 0.3–1)
MONOCYTES NFR BLD: 9.7 % (ref 4–15)
NEUTROPHILS # BLD AUTO: 4.4 K/UL (ref 1.8–7.7)
NEUTROPHILS NFR BLD: 59.8 % (ref 38–73)
PHOSPHATE SERPL-MCNC: 4.2 MG/DL (ref 2.7–4.5)
PLATELET # BLD AUTO: 289 K/UL (ref 150–350)
PMV BLD AUTO: 10.1 FL (ref 9.2–12.9)
POTASSIUM SERPL-SCNC: 4.6 MMOL/L (ref 3.5–5.1)
RBC # BLD AUTO: 3.2 M/UL (ref 4–5.4)
SODIUM SERPL-SCNC: 133 MMOL/L (ref 136–145)
WBC # BLD AUTO: 7.4 K/UL (ref 3.9–12.7)

## 2020-01-15 PROCEDURE — 36415 COLL VENOUS BLD VENIPUNCTURE: CPT | Mod: HCNC

## 2020-01-15 PROCEDURE — 27000221 HC OXYGEN, UP TO 24 HOURS: Mod: HCNC

## 2020-01-15 PROCEDURE — 63600175 PHARM REV CODE 636 W HCPCS: Mod: HCNC | Performed by: NURSE PRACTITIONER

## 2020-01-15 PROCEDURE — 96361 HYDRATE IV INFUSION ADD-ON: CPT

## 2020-01-15 PROCEDURE — 85025 COMPLETE CBC W/AUTO DIFF WBC: CPT | Mod: HCNC

## 2020-01-15 PROCEDURE — 84100 ASSAY OF PHOSPHORUS: CPT | Mod: HCNC

## 2020-01-15 PROCEDURE — 25000003 PHARM REV CODE 250: Mod: HCNC | Performed by: NURSE PRACTITIONER

## 2020-01-15 PROCEDURE — 25000003 PHARM REV CODE 250: Mod: HCNC | Performed by: FAMILY MEDICINE

## 2020-01-15 PROCEDURE — G0378 HOSPITAL OBSERVATION PER HR: HCPCS | Mod: HCNC

## 2020-01-15 PROCEDURE — 94761 N-INVAS EAR/PLS OXIMETRY MLT: CPT | Mod: HCNC

## 2020-01-15 PROCEDURE — 94640 AIRWAY INHALATION TREATMENT: CPT | Mod: HCNC

## 2020-01-15 PROCEDURE — 80048 BASIC METABOLIC PNL TOTAL CA: CPT | Mod: HCNC

## 2020-01-15 PROCEDURE — 96372 THER/PROPH/DIAG INJ SC/IM: CPT | Mod: 59

## 2020-01-15 PROCEDURE — 83735 ASSAY OF MAGNESIUM: CPT | Mod: HCNC

## 2020-01-15 PROCEDURE — 25000242 PHARM REV CODE 250 ALT 637 W/ HCPCS: Mod: HCNC | Performed by: NURSE PRACTITIONER

## 2020-01-15 RX ORDER — LIDOCAINE HYDROCHLORIDE 10 MG/ML
1 INJECTION, SOLUTION EPIDURAL; INFILTRATION; INTRACAUDAL; PERINEURAL ONCE
Status: DISCONTINUED | OUTPATIENT
Start: 2020-01-15 | End: 2020-01-16 | Stop reason: HOSPADM

## 2020-01-15 RX ORDER — MUPIROCIN 20 MG/G
OINTMENT TOPICAL
Status: CANCELLED | OUTPATIENT
Start: 2020-01-15

## 2020-01-15 RX ORDER — CALCIUM CARBONATE 200(500)MG
500 TABLET,CHEWABLE ORAL 2 TIMES DAILY
Status: DISCONTINUED | OUTPATIENT
Start: 2020-01-15 | End: 2020-01-16 | Stop reason: HOSPADM

## 2020-01-15 RX ORDER — CHOLECALCIFEROL (VITAMIN D3) 10 MCG
400 TABLET ORAL DAILY
Status: DISCONTINUED | OUTPATIENT
Start: 2020-01-15 | End: 2020-01-15

## 2020-01-15 RX ORDER — IPRATROPIUM BROMIDE AND ALBUTEROL SULFATE 2.5; .5 MG/3ML; MG/3ML
3 SOLUTION RESPIRATORY (INHALATION) EVERY 4 HOURS PRN
Status: DISCONTINUED | OUTPATIENT
Start: 2020-01-15 | End: 2020-01-16 | Stop reason: HOSPADM

## 2020-01-15 RX ORDER — IBUPROFEN 200 MG
1 TABLET ORAL DAILY
Status: DISCONTINUED | OUTPATIENT
Start: 2020-01-15 | End: 2020-01-15

## 2020-01-15 RX ORDER — LIDOCAINE 50 MG/G
1 PATCH TOPICAL
Status: DISCONTINUED | OUTPATIENT
Start: 2020-01-15 | End: 2020-01-16 | Stop reason: HOSPADM

## 2020-01-15 RX ORDER — SODIUM CHLORIDE 9 MG/ML
INJECTION, SOLUTION INTRAVENOUS CONTINUOUS
Status: CANCELLED | OUTPATIENT
Start: 2020-01-15

## 2020-01-15 RX ORDER — IPRATROPIUM BROMIDE AND ALBUTEROL SULFATE 2.5; .5 MG/3ML; MG/3ML
3 SOLUTION RESPIRATORY (INHALATION) ONCE
Status: COMPLETED | OUTPATIENT
Start: 2020-01-15 | End: 2020-01-15

## 2020-01-15 RX ORDER — AMLODIPINE BESYLATE 2.5 MG/1
2.5 TABLET ORAL DAILY
Status: DISCONTINUED | OUTPATIENT
Start: 2020-01-16 | End: 2020-01-16 | Stop reason: HOSPADM

## 2020-01-15 RX ADMIN — CALCIUM CARBONATE (ANTACID) CHEW TAB 500 MG 500 MG: 500 CHEW TAB at 08:01

## 2020-01-15 RX ADMIN — LIDOCAINE 1 PATCH: 50 PATCH CUTANEOUS at 01:01

## 2020-01-15 RX ADMIN — IPRATROPIUM BROMIDE AND ALBUTEROL SULFATE 3 ML: .5; 3 SOLUTION RESPIRATORY (INHALATION) at 07:01

## 2020-01-15 RX ADMIN — AMLODIPINE BESYLATE 10 MG: 5 TABLET ORAL at 08:01

## 2020-01-15 RX ADMIN — CHOLECALCIFEROL TAB 10 MCG (400 UNIT) 400 UNITS: 10 TAB at 08:01

## 2020-01-15 RX ADMIN — IPRATROPIUM BROMIDE AND ALBUTEROL SULFATE 3 ML: .5; 3 SOLUTION RESPIRATORY (INHALATION) at 08:01

## 2020-01-15 RX ADMIN — IPRATROPIUM BROMIDE AND ALBUTEROL SULFATE 3 ML: .5; 3 SOLUTION RESPIRATORY (INHALATION) at 02:01

## 2020-01-15 RX ADMIN — IPRATROPIUM BROMIDE AND ALBUTEROL SULFATE 3 ML: .5; 3 SOLUTION RESPIRATORY (INHALATION) at 11:01

## 2020-01-15 RX ADMIN — ENOXAPARIN SODIUM 30 MG: 100 INJECTION SUBCUTANEOUS at 04:01

## 2020-01-15 RX ADMIN — HYDROCODONE BITARTRATE AND ACETAMINOPHEN 1 TABLET: 10; 325 TABLET ORAL at 04:01

## 2020-01-15 RX ADMIN — HYDROCODONE BITARTRATE AND ACETAMINOPHEN 1 TABLET: 10; 325 TABLET ORAL at 11:01

## 2020-01-15 RX ADMIN — HYDROCODONE BITARTRATE AND ACETAMINOPHEN 1 TABLET: 10; 325 TABLET ORAL at 08:01

## 2020-01-15 RX ADMIN — ALLOPURINOL 100 MG: 100 TABLET ORAL at 08:01

## 2020-01-15 RX ADMIN — IPRATROPIUM BROMIDE AND ALBUTEROL SULFATE 3 ML: .5; 3 SOLUTION RESPIRATORY (INHALATION) at 03:01

## 2020-01-15 NOTE — ASSESSMENT & PLAN NOTE
YOSVANY (acute kidney injury)  CKD (chronic kidney disease) stage 4, GFR 15-29 ml/min    Baseline creatinine 2.2-2.5> currently 3.8>3.3  Patient reports decreased urine  Cont IVFs  Avoid nephrotoxic agents >hold Lasix, Cymbalta and Buspar  Strict I/O's  Renally dose medications   We appreciate Nephrology  Monitor

## 2020-01-15 NOTE — PLAN OF CARE
Pt AAO x 4.  VSS.  Pt remained afebrile throughout this shift.   IV fluids administered per order.   Pt remained free of falls this shift.   Pt c/o lower back pain this shift.  Pt instructed on repositioning and relaxation techniques in between PRN analgesics.   Plan of care reviewed. Patient verbalizes understanding.   Pt moving/turing independently. Frequent weight shifting encouraged. Walks with standby assist to restroom  Patient SR on monitor.   Bed low, side rails up x 2, wheels locked, call light in reach.   Bed alarm maintained for safety.   Patient instructed to call for assistance.   Hourly rounding completed.   24 hour chart check completed.  Will continue to monitor.

## 2020-01-15 NOTE — PROGRESS NOTES
[facility] Hospital Medicine  Progress Note    Patient Name: Katie Quevedo  MRN: [id number]  Patient Class: OP- Observation   Admission Date: 1/14/2020  Length of Stay: 0 days  Attending Physician: Jennifer Bowles*  Primary Care Provider: Kingston Verduzco MD        Subjective:     Principal Problem:Acute kidney injury superimposed on CKD        HPI:  74 y.o. female with past medical history of anemia, asthma, bilateral renal cysts, chronic pain, CKD, COPD on home O2, dehydration, HTN, lumbar spondylosis, migraines, osteoporosis, osteoarthritis, pulmonary embolism, and seizures who presents to the emergency department today with complaint of abnormal lab result. Patient was referred to the ED for possible admission after she had labs drawn in clinic with Dr. Gambino today. Blood work showed abnormal renal function. Recent baseline creatinine 2.2-2.5, but today it has increased to 3.8.  She has had some nausea and vomiting for the last 2-3 days, unable to take in anything oral.  She reports nausea and vomiting has improved but is still present.  Patient reports associated weakness.  Denies any chest pain, shortness of breath, abdominal pain, fever, chills, no change in bowel habits. She has had decreased urine production. Patient being admitted to hospital medicine under observation status for further medical management.    Overview/Hospital Course:  The patient was admitted to the [facility] Medicine service for further care. Nephrology was consulted. IV fluids were given.     Interval History: She is in bed drinking PJs coffee. Family at the bedside, no distress noted.    Review of Systems   Constitutional: Negative for appetite change, chills, fatigue and fever.   HENT: Negative for trouble swallowing.    Eyes: Negative for visual disturbance.   Respiratory: Negative for shortness of breath.    Cardiovascular: Negative for chest pain, palpitations and leg swelling.   Gastrointestinal:  Negative for blood in stool, diarrhea, nausea and vomiting.   Genitourinary: Negative for difficulty urinating and hematuria.   Musculoskeletal: Negative for gait problem.   Skin: Negative for wound.   Neurological: Negative for dizziness, weakness and light-headedness.   Hematological: Does not bruise/bleed easily.   Psychiatric/Behavioral: Negative for agitation, confusion and hallucinations.     Objective:     Vital Signs (Most Recent):  Temp: 99 °F (37.2 °C) (01/15/20 1129)  Pulse: 97 (01/15/20 1154)  Resp: 18 (01/15/20 1129)  BP: 121/83 (01/15/20 1129)  SpO2: 95 % (01/15/20 1500) Vital Signs (24h Range):  Temp:  [97.7 °F (36.5 °C)-99 °F (37.2 °C)] 99 °F (37.2 °C)  Pulse:  [80-97] 97  Resp:  [18-22] 18  SpO2:  [93 %-100 %] 95 %  BP: (119-149)/(56-83) 121/83     Weight: 63.5 kg (139 lb 15.9 oz)  Body mass index is 25.6 kg/m².    Intake/Output Summary (Last 24 hours) at 1/15/2020 1543  Last data filed at 1/15/2020 1400  Gross per 24 hour   Intake 1698.75 ml   Output 1550 ml   Net 148.75 ml      Physical Exam   Constitutional: She is oriented to person, place, and time. No distress.   HENT:   Head: Normocephalic.   Eyes: Pupils are equal, round, and reactive to light.   Neck: Normal range of motion. Neck supple.   Cardiovascular: Normal rate.   Pulmonary/Chest: Effort normal and breath sounds normal.   Abdominal: Soft. Bowel sounds are normal.   Musculoskeletal: She exhibits no edema or tenderness.   Neurological: She is alert and oriented to person, place, and time.   Skin: Skin is warm and dry. Capillary refill takes less than 2 seconds. She is not diaphoretic.   Psychiatric: She has a normal mood and affect. Her behavior is normal. Judgment and thought content normal.   Nursing note and vitals reviewed.      Significant Labs:   BMP:   Recent Labs   Lab 01/15/20  0623   GLU 74   *   K 4.6   CL 99   CO2 22*   BUN 41*   CREATININE 3.3*   CALCIUM 9.5   MG 2.3     CBC:   Recent Labs   Lab 01/14/20 2000  01/15/20  0623   WBC 8.34 7.40   HGB 10.0* 9.3*   HCT 32.0* 29.8*    289       Significant Imaging: I have reviewed all pertinent imaging results/findings within the past 24 hours.      Assessment/Plan:      * Acute kidney injury superimposed on CKD  YOSVANY (acute kidney injury)  CKD (chronic kidney disease) stage 4, GFR 15-29 ml/min    Baseline creatinine 2.2-2.5> currently 3.8>3.3  Patient reports decreased urine  Cont IVFs  Avoid nephrotoxic agents >hold Lasix, Cymbalta and Buspar  Strict I/O's  Renally dose medications   We appreciate Nephrology  Monitor    Non-intractable vomiting with nausea  Zofran prn  IVFs  Monitor      Chronic diastolic heart failure  Essential hypertension    Does not appear fluid overloaded on exam  Denies chest pain or SOB  Hold lasix secondary to abnormal kidney function  Continuous cardiac monitoring  Continue amlodipine  Daily weight  monitor        Chronic respiratory failure with hypoxia, on home O2 therapy  COPD (chronic obstructive pulmonary disease)    Continue home O2  Duonebs prn  Hold Trelegy for now 2/2 not on hospital formulary      Iron deficiency anemia  Monitor      Chronic low back pain  Continue home Baclofen and Norco        VTE Risk Mitigation (From admission, onward)         Ordered     enoxaparin injection 30 mg  Daily      01/14/20 2212     Place DAWN hose  Until discontinued      01/14/20 1842     Place sequential compression device  Until discontinued      01/14/20 1842     IP VTE HIGH RISK PATIENT  Once      01/14/20 1842                      Jere Mercado NP  Department of Hospital Medicine   [facility]

## 2020-01-15 NOTE — PROGRESS NOTES
Pharmacy New Medication Education    Patient and/or Caregiver ACCEPTED medication education.    Pharmacy has provided education on the name, indication, and possible side effects of the medication(s) prescribed, using teach-back method.     Learners of pharmacy medication education includes:  patient    The following medications have also been discussed, during this admission.     Current Facility-Administered Medications   Medication Frequency    acetaminophen tablet 650 mg Q4H PRN    albuterol-ipratropium 2.5 mg-0.5 mg/3 mL nebulizer solution 3 mL Q4H PRN    allopurinol tablet 100 mg Daily    amLODIPine tablet 10 mg Daily    baclofen tablet 10 mg TID PRN    calcium carbonate 200 mg calcium (500 mg) chewable tablet 500 mg BID    cholecalciferol (vitamin D3) capsule/tablet 400 Units Daily    enoxaparin injection 30 mg Daily    HYDROcodone-acetaminophen  mg per tablet 1 tablet TID PRN    lactated ringers infusion Continuous    ondansetron disintegrating tablet 4 mg Q6H PRN    ondansetron injection 4 mg Q6H PRN    sodium chloride 0.9% flush 10 mL PRN    traZODone tablet 50 mg QHS          Thank you  Jerardo Pretty, PharmD

## 2020-01-15 NOTE — NURSING
Consult to Nephro called in to [phone] /spoke with Dr Diggs personally. Will come to see pt today.

## 2020-01-15 NOTE — ASSESSMENT & PLAN NOTE
YOSVANY (acute kidney injury)  CKD (chronic kidney disease) stage 4, GFR 15-29 ml/min    Baseline creatinine 2.2-2.5> currently 3.8  Patient reports decreased urine  Start IVFs  Avoid nephrotoxic agents >hold Lasix, Cymbalta and Buspar  Strict I/O's  Renally dose medications   Consult Nephrology: pending  Monitor

## 2020-01-15 NOTE — CONSULTS
Today`s Date: 1/15/2020     Admit Date: 1/14/2020    Admitting Physician: Jennifer Bowles*    Patient`s Name: Katie Quevedo , 74 y.o. female    Reason for consultation  Evaluation for av fistula   Patient Active Problem List:     Melanoma     Chronic pain     Vitamin deficiency     Cervical post-laminectomy syndrome     Lumbar postlaminectomy syndrome     Chronic neck pain     Lumbosacral spondylosis without myelopathy     Isolated cervical dystonia     Primary osteoarthritis of both knees     Subdeltoid bursitis, L>R.     Other fragments of torsion dystonia     Nuclear sclerosis - Both Eyes     Rotator cuff tear, right     Biceps tendonitis     Osteoarthritis, hip, bilateral     Lumbar radiculopathy, BLE     Cervical radiculopathy, BUE     Osteoporosis     Chronic low back pain     Iron deficiency anemia     Essential hypertension     Atrophy of left kidney     Kidney cysts     History of colon polyps     Pleural effusion     Pericardial effusion     Chronic respiratory failure with hypoxia, on home O2 therapy     Acute kidney injury superimposed on CKD     Anemia, chronic renal failure, stage 4 (severe)     Lipoma of torso     Chronic diastolic heart failure     CKD (chronic kidney disease) stage 4, GFR 15-29 ml/min     COPD (chronic obstructive pulmonary disease)     Non-intractable vomiting with nausea     YOSVANY (acute kidney injury)      Past Medical History:  No date: Anemia  No date: Asthma  No date: Bilateral renal cysts  No date: Cataract  7/26/2012: Chronic LBP  No date: Chronic pain  No date: CKD (chronic kidney disease), stage IV  No date: COPD (chronic obstructive pulmonary disease)  No date: Dehydration  No date: Encounter for blood transfusion  No date: HTN (hypertension)  No date: Lumbar spondylosis  No date: Melanoma      Comment:  of the lip  No date: Metabolic bone disease  No date: Migraines, neuralgic  No date: Osteoporosis  No date: Primary osteoarthritis of both knees      Comment:  s/p  Rt TKA  No date: Pulmonary embolism with infarction  No date: Seizures  9/27/2012: Subdeltoid bursitis, L>R.  No date: Ulcer  No date: Vitamin D deficiency disease    Past Surgical History:  No date: BLADDER SUSPENSION  11/18/13: CATARACT EXTRACTION      Comment:  left eye  No date: CERVICAL LAMINECTOMY      Comment:  x3, fusion x1  2009: COLONOSCOPY  No date: HYSTERECTOMY  2001: JOINT REPLACEMENT      Comment:  total right knee   No date: LUMBAR LAMINECTOMY      Comment:  x 3, fusion x1  No date: OOPHORECTOMY    Prior to Admission medications :  Medication albuterol (PROVENTIL) 2.5 mg /3 mL (0.083 %) nebulizer solution, Sig Take 3 mLs (2.5 mg total) by nebulization every 6 (six) hours as needed for Wheezing. Rescue, Start Date 11/8/19, End Date 11/7/20, Taking? Yes, Authorizing Provider Kingston Verduzco MD    Medication albuterol-ipratropium (DUO-NEB) 2.5 mg-0.5 mg/3 mL nebulizer solution, Sig Take 3 mLs by nebulization every 6 (six) hours as needed for Shortness of Breath. Rescue, Start Date 12/18/19, End Date 12/17/20, Taking? Yes, Authorizing Provider Giancarlo Rust MD    Medication allopurinol (ZYLOPRIM) 100 MG tablet, Sig Take 1 tablet (100 mg total) by mouth once daily. Take 100 mg -on Monday and Friday only, Start Date 10/28/19, End Date , Taking? Yes, Authorizing Provider Aura Diggs MD    Medication amLODIPine (NORVASC) 10 MG tablet, Sig Take 10 mg by mouth once daily., Start Date , End Date , Taking? Yes, Authorizing Provider Historical Provider, MD    Medication busPIRone (BUSPAR) 5 MG Tab, Sig Take 5 mg by mouth 3 (three) times daily. , Start Date 7/23/19, End Date , Taking? Yes, Authorizing Provider Historical Provider, MD    Medication calcium-vitamin D3 (CALCIUM 500 + D) 500 mg(1,250mg) -200 unit per tablet, Sig Take 1 tablet by mouth 2 (two) times daily with meals., Start Date , End Date , Taking? Yes, Authorizing Provider Historical Provider, MD    Medication diazePAM (VALIUM) 2 MG  tablet, Sig Take 2 mg by mouth once daily. , Start Date 7/2/19, End Date , Taking? Yes, Authorizing Provider Historical Provider, MD    Medication DULoxetine (CYMBALTA) 30 MG capsule, Sig TAKE 1 CAPSULE BY MOUTH EVERY DAYPatient taking differently: Take 30 mg by mouth once daily. , Start Date 12/28/19, End Date , Taking? Yes, Authorizing Provider Pushpa Mcmahon MD    Medication fluticasone-umeclidin-vilanter (TRELEGY ELLIPTA) 100-62.5-25 mcg DsDv, Sig Inhale 1 puff by mouth into the lungs once daily., Start Date 1/14/20, End Date , Taking? Yes, Authorizing Provider Clemencia Gambino MD    Medication furosemide (LASIX) 20 MG tablet, Sig Take 1 tablet (20 mg total) by mouth 2 (two) times daily., Start Date 1/14/20, End Date 1/13/21, Taking? Yes, Authorizing Provider Clemencia Gambino MD    Medication ondansetron (ZOFRAN-ODT) 4 MG TbDL, Sig Dissolve one tablet under the tongue every 6 (six) hours as needed., Start Date 9/16/19, End Date , Taking? Yes, Authorizing Provider Jere Mercado NP    Medication sodium bicarbonate 650 MG tablet, Sig Take 1 tablet (650 mg total) by mouth 2 (two) times daily., Start Date 7/29/19, End Date 7/28/20, Taking? Yes, Authorizing Provider Aura Diggs MD    Medication ergocalciferol (ERGOCALCIFEROL) 50,000 unit Cap, Sig TAKE 1 CAPSULE BY MOUTH EVERY 7 DAYS, FOR 12 WEEKS, THEN MONTHLY, Start Date 6/24/19, End Date 1/15/20, Taking? Yes, Authorizing Provider Historical Provider, MD    Medication baclofen (LIORESAL) 10 MG tablet, Sig TAKE 1 TABLET (10 MG TOTAL) BY MOUTH 3 (THREE) TIMES DAILY AS NEEDED (MUSCLE SPASMS)., Start Date 11/26/19, End Date 1/14/20, Taking? , Authorizing Provider Pushpa Mcmahon MD    Medication HYDROcodone-acetaminophen (NORCO)  mg per tablet, Sig Take 1 tablet by mouth 3 (three) times daily as needed., Start Date 12/27/19, End Date 1/26/20, Taking? , Authorizing Provider Greta Prado MD    Medication HYDROcodone-acetaminophen  (NORCO)  mg per tablet, Sig Take 1 tablet by mouth 3 (three) times daily as needed., Start Date 12/28/19, End Date 1/27/20, Taking? , Authorizing Provider Pushpa Mcmahon MD    Medication zolpidem (AMBIEN) 5 MG Tab, Sig Take 5 mg by mouth every evening. , Start Date 6/4/19, End Date , Taking? , Authorizing Provider Historical Provider, MD    Medication traZODone (DESYREL) 50 MG tablet, Sig Take 1 tablet (50 mg total) by mouth every evening., Start Date 1/28/19, End Date 1/15/20, Taking? , Authorizing Provider Kingston Verduzco MD      No current facility-administered medications on file prior to encounter.   Current Outpatient Medications on File Prior to Encounter:  albuterol (PROVENTIL) 2.5 mg /3 mL (0.083 %) nebulizer solution, Take 3 mLs (2.5 mg total) by nebulization every 6 (six) hours as needed for Wheezing. Rescue, Disp: 100 each, Rfl: 3  albuterol-ipratropium (DUO-NEB) 2.5 mg-0.5 mg/3 mL nebulizer solution, Take 3 mLs by nebulization every 6 (six) hours as needed for Shortness of Breath. Rescue, Disp: 3 Box, Rfl: 5  allopurinol (ZYLOPRIM) 100 MG tablet, Take 1 tablet (100 mg total) by mouth once daily. Take 100 mg -on Monday and Friday only, Disp: 30 tablet, Rfl: 1  amLODIPine (NORVASC) 10 MG tablet, Take 10 mg by mouth once daily., Disp: , Rfl:   busPIRone (BUSPAR) 5 MG Tab, Take 5 mg by mouth 3 (three) times daily. , Disp: , Rfl:   calcium-vitamin D3 (CALCIUM 500 + D) 500 mg(1,250mg) -200 unit per tablet, Take 1 tablet by mouth 2 (two) times daily with meals., Disp: , Rfl:   diazePAM (VALIUM) 2 MG tablet, Take 2 mg by mouth once daily. , Disp: , Rfl:   DULoxetine (CYMBALTA) 30 MG capsule, TAKE 1 CAPSULE BY MOUTH EVERY DAY (Patient taking differently: Take 30 mg by mouth once daily. ), Disp: 90 capsule, Rfl: 1  fluticasone-umeclidin-vilanter (TRELEGY ELLIPTA) 100-62.5-25 mcg DsDv, Inhale 1 puff by mouth into the lungs once daily., Disp: 90 each, Rfl: 3  furosemide (LASIX) 20 MG tablet, Take 1  tablet (20 mg total) by mouth 2 (two) times daily., Disp: 60 tablet, Rfl: 11  ondansetron (ZOFRAN-ODT) 4 MG TbDL, Dissolve one tablet under the tongue every 6 (six) hours as needed., Disp: 30 tablet, Rfl: 3  sodium bicarbonate 650 MG tablet, Take 1 tablet (650 mg total) by mouth 2 (two) times daily., Disp: 120 tablet, Rfl: 11  [DISCONTINUED] ergocalciferol (ERGOCALCIFEROL) 50,000 unit Cap, TAKE 1 CAPSULE BY MOUTH EVERY 7 DAYS, FOR 12 WEEKS, THEN MONTHLY, Disp: , Rfl:   baclofen (LIORESAL) 10 MG tablet, TAKE 1 TABLET (10 MG TOTAL) BY MOUTH 3 (THREE) TIMES DAILY AS NEEDED (MUSCLE SPASMS)., Disp: 90 tablet, Rfl: 1  HYDROcodone-acetaminophen (NORCO)  mg per tablet, Take 1 tablet by mouth 3 (three) times daily as needed., Disp: 90 tablet, Rfl: 0  HYDROcodone-acetaminophen (NORCO)  mg per tablet, Take 1 tablet by mouth 3 (three) times daily as needed., Disp: 90 tablet, Rfl: 0  zolpidem (AMBIEN) 5 MG Tab, Take 5 mg by mouth every evening. , Disp: , Rfl: 5  [DISCONTINUED] traZODone (DESYREL) 50 MG tablet, Take 1 tablet (50 mg total) by mouth every evening., Disp: 30 tablet, Rfl: 11         Review of patient's allergies indicates:   -- Aspirin     --  Other reaction(s): hx of ulcers   -- Tetracycline -- Swelling    --  Other reaction(s): Swelling   -- Penicillins -- Rash    --  Other reaction(s): Hives             Other reaction(s): Rash             Other reaction(s): Rash             Other reaction(s): Hives    Social History:   reports that she has quit smoking. Her smoking use included cigarettes. She has never used smokeless tobacco. She reports that she drinks about 1.0 standard drinks of alcohol per week. She reports that she does not use drugs.     Review of patient's family history indicates:  Problem: Arthritis      Relation: Mother          Age of Onset: (Not Specified)  Problem: Stroke      Relation: Mother          Age of Onset: (Not Specified)  Problem: Hypertension      Relation: Father          Age  of Onset: (Not Specified)  Problem: Cancer      Relation: Father          Age of Onset: (Not Specified)  Problem: Cataracts      Relation: Father          Age of Onset: (Not Specified)  Problem: Diabetes      Relation: Maternal Aunt          Age of Onset: (Not Specified)  Problem: Hypertension      Relation: Maternal Grandfather          Age of Onset: (Not Specified)  Problem: Heart disease      Relation: Maternal Grandfather          Age of Onset: (Not Specified)  Problem: Heart attack      Relation: Maternal Grandfather          Age of Onset: (Not Specified)  Problem: Cataracts      Relation: Sister          Age of Onset: (Not Specified)  Problem: Glaucoma      Relation: Cousin          Age of Onset: (Not Specified)      PHYSICAL EXAMINATION  Temp:  [97.7 °F (36.5 °C)-99 °F (37.2 °C)] 99 °F (37.2 °C)  Pulse:  [80-97] 97  Resp:  [18-22] 18  SpO2:  [93 %-100 %] 95 %  BP: (119-149)/(56-83) 121/83    General Condition:   alert x 3    Head & Neck  Anemia: None  Jaundice: None  Neck vein: Not distended  Carotid Bruits: none  Lymph nodes: none palpable  Thyroid: normal    Chest: normal    Heart: normal    Rt. Breast: not examined  Lt. Breast: not examined  Axillary lymph nodes: none    Abdomen: Soft,  None tender with no palpable mass or organ  Hernia: none    Rectal: Defered    Extremities: normal    Vascular: normal    Specific focus Examination    Imp: crf, copd , htn    Plan: av fistula next week tuesday

## 2020-01-15 NOTE — HPI
74 y.o. female with past medical history of anemia, asthma, bilateral renal cysts, chronic pain, CKD, COPD on home O2, dehydration, HTN, lumbar spondylosis, migraines, osteoporosis, osteoarthritis, pulmonary embolism, and seizures who presents to the emergency department today with complaint of abnormal lab result. Patient was referred to the ED for possible admission after she had labs drawn in clinic with Dr. Gambino today. Blood work showed abnormal renal function. Recent baseline creatinine 2.2-2.5, but today it has increased to 3.8.  She has had some nausea and vomiting for the last 2-3 days, unable to take in anything oral.  She reports nausea and vomiting has improved but is still present.  Patient reports associated weakness.  Denies any chest pain, shortness of breath, abdominal pain, fever, chills, no change in bowel habits. She has had decreased urine production. Patient being admitted to hospital medicine under observation status for further medical management.

## 2020-01-15 NOTE — HOSPITAL COURSE
The patient was admitted to the [facility] Medicine service for further care. Nephrology was consulted. IV fluids were given. General surgery was consulted. Vein mapping completed while admitted---will plan AV fistula on Tuesday. Her creatinine has improved with fluids but her potassium is at 5.7. Will give Kayexelate PO once---nephrology has ordered calcium gluconate. Potassium is now 5.3. Will d/c home. She will need to follow a low potassium diet.

## 2020-01-15 NOTE — PLAN OF CARE
met with patient and spouse , Dre Quevedo , [phone], to complete discharge assessment. Patient was alert and oriented. Patient was on O2 flow.  Prior to admission patient stated she was independent. Patient's spouse is her primary help at home if needed. Patient is retired and still drives. Patient has medical equipment ( cane, ventilator, 02) within the home, no  services. Patient has no issues affording medication at this time.     Patient has no social needs at this time. SW provided patient with discharge brochure and wrote contact information on the whiteboard. SW encouraged patient to contact if any needs or concerns arise. Patient verbalized understanding.       01/15/20 1301   Discharge Assessment   Assessment Type Discharge Planning Assessment   Confirmed/corrected address and phone number on facesheet? Yes   Assessment information obtained from? Patient;Caregiver   Prior to hospitilization cognitive status: Alert/Oriented   Prior to hospitalization functional status: Independent   Current cognitive status: Alert/Oriented   Current Functional Status: Independent   Lives With spouse   Able to Return to Prior Arrangements yes   Is patient able to care for self after discharge? Yes   Who are your caregiver(s) and their phone number(s)? Dre Quevedo spouse, [phone]   Readmission Within the Last 30 Days no previous admission in last 30 days   Patient currently being followed by outpatient case management? No   Patient currently receives any other outside agency services? No   Equipment Currently Used at Home cane, straight;oxygen;ventilator   Do you have any problems affording any of your prescribed medications? No   Is the patient taking medications as prescribed? yes   Does the patient have transportation home? Yes   Transportation Anticipated family or friend will provide   Does the patient receive services at the [facility]? No   Discharge Plan A Home   Discharge Plan B Home with  family   DME Needed Upon Discharge  none   Patient/Family in Agreement with Plan yes

## 2020-01-15 NOTE — SUBJECTIVE & OBJECTIVE
Past Medical History:   Diagnosis Date    Anemia     Asthma     Bilateral renal cysts     Cataract     Chronic LBP 7/26/2012    Chronic pain     CKD (chronic kidney disease), stage IV     COPD (chronic obstructive pulmonary disease)     Dehydration     Encounter for blood transfusion     HTN (hypertension)     Lumbar spondylosis     Melanoma     of the lip    Metabolic bone disease     Migraines, neuralgic     Osteoporosis     Primary osteoarthritis of both knees     s/p Rt TKA    Pulmonary embolism with infarction     Seizures     Subdeltoid bursitis, L>R. 9/27/2012    Ulcer     Vitamin D deficiency disease        Past Surgical History:   Procedure Laterality Date    BLADDER SUSPENSION      CATARACT EXTRACTION  11/18/13    left eye    CERVICAL LAMINECTOMY      x3, fusion x1    COLONOSCOPY  2009    HYSTERECTOMY      JOINT REPLACEMENT  2001    total right knee     LUMBAR LAMINECTOMY      x 3, fusion x1    OOPHORECTOMY         Review of patient's allergies indicates:   Allergen Reactions    Aspirin      Other reaction(s): hx of ulcers    Tetracycline Swelling     Other reaction(s): Swelling    Penicillins Rash     Other reaction(s): Hives  Other reaction(s): Rash  Other reaction(s): Rash  Other reaction(s): Hives       No current facility-administered medications on file prior to encounter.      Current Outpatient Medications on File Prior to Encounter   Medication Sig    albuterol (PROVENTIL) 2.5 mg /3 mL (0.083 %) nebulizer solution Take 3 mLs (2.5 mg total) by nebulization every 6 (six) hours as needed for Wheezing. Rescue    albuterol-ipratropium (DUO-NEB) 2.5 mg-0.5 mg/3 mL nebulizer solution Take 3 mLs by nebulization every 6 (six) hours as needed for Shortness of Breath. Rescue    allopurinol (ZYLOPRIM) 100 MG tablet Take 1 tablet (100 mg total) by mouth once daily. Take 100 mg -on Monday and Friday only    amLODIPine (NORVASC) 10 MG tablet Take 10 mg by mouth once daily.     busPIRone (BUSPAR) 5 MG Tab Take 5 mg by mouth.    calcium-vitamin D3 (CALCIUM 500 + D) 500 mg(1,250mg) -200 unit per tablet Take 1 tablet by mouth 2 (two) times daily with meals.    diazePAM (VALIUM) 2 MG tablet Take 2 mg by mouth.    DULoxetine (CYMBALTA) 30 MG capsule TAKE 1 CAPSULE BY MOUTH EVERY DAY    ergocalciferol (ERGOCALCIFEROL) 50,000 unit Cap TAKE 1 CAPSULE BY MOUTH EVERY 7 DAYS, FOR 12 WEEKS, THEN MONTHLY    fluticasone-umeclidin-vilanter (TRELEGY ELLIPTA) 100-62.5-25 mcg DsDv Inhale 1 puff by mouth into the lungs once daily.    furosemide (LASIX) 20 MG tablet Take 1 tablet (20 mg total) by mouth 2 (two) times daily.    ondansetron (ZOFRAN-ODT) 4 MG TbDL Dissolve one tablet under the tongue every 6 (six) hours as needed.    sodium bicarbonate 650 MG tablet Take 1 tablet (650 mg total) by mouth 2 (two) times daily.    baclofen (LIORESAL) 10 MG tablet TAKE 1 TABLET (10 MG TOTAL) BY MOUTH 3 (THREE) TIMES DAILY AS NEEDED (MUSCLE SPASMS).    HYDROcodone-acetaminophen (NORCO)  mg per tablet Take 1 tablet by mouth 3 (three) times daily as needed.    HYDROcodone-acetaminophen (NORCO)  mg per tablet Take 1 tablet by mouth 3 (three) times daily as needed.    traZODone (DESYREL) 50 MG tablet Take 1 tablet (50 mg total) by mouth every evening.    zolpidem (AMBIEN) 5 MG Tab Take 5 mg by mouth once daily.    [DISCONTINUED] fluticasone-umeclidin-vilanter (TRELEGY ELLIPTA) 100-62.5-25 mcg DsDv Inhale 1 puff into the lungs once daily.    [DISCONTINUED] furosemide (LASIX) 20 MG tablet Take 1 tablet (20 mg total) by mouth 2 (two) times daily.    [DISCONTINUED] SPIRIVA RESPIMAT 2.5 mcg/actuation Mist      Family History     Problem Relation (Age of Onset)    Arthritis Mother    Cancer Father    Cataracts Father, Sister    Diabetes Maternal Aunt    Glaucoma Cousin    Heart attack Maternal Grandfather    Heart disease Maternal Grandfather    Hypertension Father, Maternal Grandfather    Stroke  Mother        Tobacco Use    Smoking status: Former Smoker     Types: Cigarettes    Smokeless tobacco: Never Used   Substance and Sexual Activity    Alcohol use: Yes     Alcohol/week: 1.0 standard drinks     Types: 1 Glasses of wine per week     Comment: Rare    Drug use: No    Sexual activity: Never     Review of Systems   Constitutional: Negative for chills and fever.   HENT: Negative for congestion, rhinorrhea and sinus pain.    Eyes: Negative for visual disturbance.   Respiratory: Negative for chest tightness and shortness of breath.    Cardiovascular: Negative for chest pain and palpitations.   Gastrointestinal: Positive for nausea and vomiting. Negative for abdominal distention and abdominal pain.   Genitourinary: Positive for decreased urine volume.   Musculoskeletal: Negative for arthralgias and myalgias.   Skin: Negative for color change and wound.   Neurological: Positive for weakness. Negative for light-headedness and headaches.   Hematological: Does not bruise/bleed easily.   Psychiatric/Behavioral: Negative for agitation.     Objective:     Vital Signs (Most Recent):  Temp: 97.7 °F (36.5 °C) (01/14/20 1748)  Pulse: 81 (01/14/20 2103)  Resp: 18 (01/14/20 1748)  BP: 132/70 (01/14/20 2103)  SpO2: 96 % (01/14/20 2103) Vital Signs (24h Range):  Temp:  [97.5 °F (36.4 °C)-97.7 °F (36.5 °C)] 97.7 °F (36.5 °C)  Pulse:  [81-92] 81  Resp:  [18] 18  SpO2:  [95 %-96 %] 96 %  BP: (115-132)/(59-70) 132/70     Weight: 59 kg (130 lb)  Body mass index is 23.4 kg/m².    Physical Exam   Constitutional: She appears well-developed and well-nourished.   HENT:   Head: Normocephalic and atraumatic.   Eyes: Pupils are equal, round, and reactive to light. EOM are normal.   Neck: Normal range of motion. Neck supple.   Cardiovascular: Normal rate.   No murmur heard.  Pulmonary/Chest: Effort normal. No respiratory distress.   Abdominal: Soft. Bowel sounds are normal.   Musculoskeletal: Normal range of motion. She exhibits no  deformity.   Skin: Skin is warm and dry.   Psychiatric: She has a normal mood and affect. Her behavior is normal.         CRANIAL NERVES     CN III, IV, VI   Pupils are equal, round, and reactive to light.  Extraocular motions are normal.        Significant Labs:   BMP:   Recent Labs   Lab 01/14/20 2000   GLU 97   *   K 4.7   CL 99   CO2 22*   BUN 45*   CREATININE 3.8*   CALCIUM 9.7     CBC:   Recent Labs   Lab 01/14/20 2000   WBC 8.34   HGB 10.0*   HCT 32.0*        CMP:   Recent Labs   Lab 01/14/20  1440 01/14/20 2000   * 134*   K 5.2* 4.7    99   CO2 24 22*   GLU 87 97   BUN 43* 45*   CREATININE 3.8* 3.8*   CALCIUM 9.7 9.7   PROT 7.9 8.4   ALBUMIN 3.8 4.0   BILITOT 0.2 0.3   ALKPHOS 150* 161*   AST 19 24   ALT 8* 10   ANIONGAP 11 13   EGFRNONAA 11* 11*       Lipase:   Recent Labs   Lab 01/14/20  1440   LIPASE 31       Significant Imaging: I have reviewed all pertinent imaging results/findings within the past 24 hours.

## 2020-01-15 NOTE — PLAN OF CARE
Patient on oxygen with documented flow.  Will attempt to wean per O2 order protocol.  The proper method of use, as well as anticipated side effects, of this aerosol treatment are discussed and demonstrated to the patient.   Will continue to monitor.

## 2020-01-15 NOTE — CONSULTS
NEPHROLOGY CONSULT NOTE    HPI & INTERVAL HISTORY:    Past Medical History:   Diagnosis Date    Anemia     Asthma     Bilateral renal cysts     Cataract     Chronic LBP 7/26/2012    Chronic pain     CKD (chronic kidney disease), stage IV     COPD (chronic obstructive pulmonary disease)     Dehydration     Encounter for blood transfusion     HTN (hypertension)     Lumbar spondylosis     Melanoma     of the lip    Metabolic bone disease     Migraines, neuralgic     Osteoporosis     Primary osteoarthritis of both knees     s/p Rt TKA    Pulmonary embolism with infarction     Seizures     Subdeltoid bursitis, L>R. 9/27/2012    Ulcer     Vitamin D deficiency disease       Past Surgical History:   Procedure Laterality Date    BLADDER SUSPENSION      CATARACT EXTRACTION  11/18/13    left eye    CERVICAL LAMINECTOMY      x3, fusion x1    COLONOSCOPY  2009    HYSTERECTOMY      JOINT REPLACEMENT  2001    total right knee     LUMBAR LAMINECTOMY      x 3, fusion x1    OOPHORECTOMY        Review of patient's allergies indicates:   Allergen Reactions    Aspirin      Other reaction(s): hx of ulcers    Tetracycline Swelling     Other reaction(s): Swelling    Penicillins Rash     Other reaction(s): Hives  Other reaction(s): Rash  Other reaction(s): Rash  Other reaction(s): Hives      Medications Prior to Admission   Medication Sig Dispense Refill Last Dose    albuterol (PROVENTIL) 2.5 mg /3 mL (0.083 %) nebulizer solution Take 3 mLs (2.5 mg total) by nebulization every 6 (six) hours as needed for Wheezing. Rescue 100 each 3 1/14/2020    albuterol-ipratropium (DUO-NEB) 2.5 mg-0.5 mg/3 mL nebulizer solution Take 3 mLs by nebulization every 6 (six) hours as needed for Shortness of Breath. Rescue 3 Box 5 1/14/2020    allopurinol (ZYLOPRIM) 100 MG tablet Take 1 tablet (100 mg total) by mouth once daily. Take 100 mg -on Monday and Friday only 30 tablet 1 1/14/2020    amLODIPine (NORVASC) 10 MG tablet  Take 10 mg by mouth once daily.   1/14/2020    busPIRone (BUSPAR) 5 MG Tab Take 5 mg by mouth.   1/14/2020    calcium-vitamin D3 (CALCIUM 500 + D) 500 mg(1,250mg) -200 unit per tablet Take 1 tablet by mouth 2 (two) times daily with meals.   1/14/2020    diazePAM (VALIUM) 2 MG tablet Take 2 mg by mouth.   1/14/2020    DULoxetine (CYMBALTA) 30 MG capsule TAKE 1 CAPSULE BY MOUTH EVERY DAY 90 capsule 1 1/14/2020    ergocalciferol (ERGOCALCIFEROL) 50,000 unit Cap TAKE 1 CAPSULE BY MOUTH EVERY 7 DAYS, FOR 12 WEEKS, THEN MONTHLY   1/14/2020    fluticasone-umeclidin-vilanter (TRELEGY ELLIPTA) 100-62.5-25 mcg DsDv Inhale 1 puff by mouth into the lungs once daily. 90 each 3 1/14/2020    furosemide (LASIX) 20 MG tablet Take 1 tablet (20 mg total) by mouth 2 (two) times daily. 60 tablet 11 1/14/2020    ondansetron (ZOFRAN-ODT) 4 MG TbDL Dissolve one tablet under the tongue every 6 (six) hours as needed. 30 tablet 3 1/14/2020    sodium bicarbonate 650 MG tablet Take 1 tablet (650 mg total) by mouth 2 (two) times daily. 120 tablet 11 1/14/2020    baclofen (LIORESAL) 10 MG tablet TAKE 1 TABLET (10 MG TOTAL) BY MOUTH 3 (THREE) TIMES DAILY AS NEEDED (MUSCLE SPASMS). 90 tablet 1 Unknown    HYDROcodone-acetaminophen (NORCO)  mg per tablet Take 1 tablet by mouth 3 (three) times daily as needed. 90 tablet 0 Unknown    HYDROcodone-acetaminophen (NORCO)  mg per tablet Take 1 tablet by mouth 3 (three) times daily as needed. 90 tablet 0 Unknown    traZODone (DESYREL) 50 MG tablet Take 1 tablet (50 mg total) by mouth every evening. 30 tablet 11 Unknown    zolpidem (AMBIEN) 5 MG Tab Take 5 mg by mouth once daily.  5 Unknown       Social History     Socioeconomic History    Marital status:      Spouse name: Not on file    Number of children: Not on file    Years of education: Not on file    Highest education level: Not on file   Occupational History    Not on file   Social Needs    Financial resource  strain: Not on file    Food insecurity:     Worry: Not on file     Inability: Not on file    Transportation needs:     Medical: Not on file     Non-medical: Not on file   Tobacco Use    Smoking status: Former Smoker     Types: Cigarettes    Smokeless tobacco: Never Used   Substance and Sexual Activity    Alcohol use: Yes     Alcohol/week: 1.0 standard drinks     Types: 1 Glasses of wine per week     Comment: Rare    Drug use: No    Sexual activity: Never   Lifestyle    Physical activity:     Days per week: Not on file     Minutes per session: Not on file    Stress: Not on file   Relationships    Social connections:     Talks on phone: Not on file     Gets together: Not on file     Attends Buddhist service: Not on file     Active member of club or organization: Not on file     Attends meetings of clubs or organizations: Not on file     Relationship status: Not on file   Other Topics Concern    Are you pregnant or think you may be? Not Asked    Breast-feeding Not Asked   Social History Narrative    Not on file        MEDS   allopurinol  100 mg Oral Daily    amLODIPine  10 mg Oral Daily    calcium carbonate  500 mg Oral BID    cholecalciferol (vitamin D3)  400 Units Oral Daily    enoxaparin  30 mg Subcutaneous Daily    traZODone  50 mg Oral QHS          CONTINOUS INFUSIONS:      Intake/Output Summary (Last 24 hours) at 1/15/2020 1126  Last data filed at 1/15/2020 0945  Gross per 24 hour   Intake 1218.75 ml   Output 1050 ml   Net 168.75 ml        HEMODYNAMICS:    Temp:  [97.5 °F (36.4 °C)-98.7 °F (37.1 °C)] 98.7 °F (37.1 °C)  Pulse:  [80-96] 96  Resp:  [18-22] 19  SpO2:  [93 %-100 %] 93 %  BP: (115-149)/(56-79) 149/79   Gen: NAD  Admitted with nausea, vomiting  SOB  Cough  Back pain  No diarrhea  No fever  No chills  No CP  No join pain  Reports weight gain  Good appetite   Cards: pulse 96  Pul: diminished breath sounds  Abdomen soft   Ext: trace edema   Skin: dry   LABS   Lab Results   Component  Value Date    WBC 7.40 01/15/2020    HGB 9.3 (L) 01/15/2020    HCT 29.8 (L) 01/15/2020    MCV 93 01/15/2020     01/15/2020        Recent Labs   Lab 01/14/20  2000 01/15/20  0623   GLU 97 74   CALCIUM 9.7 9.5   ALBUMIN 4.0  --    PROT 8.4  --    * 133*   K 4.7 4.6   CO2 22* 22*   CL 99 99   BUN 45* 41*   CREATININE 3.8* 3.3*   ALKPHOS 161*  --    ALT 10  --    AST 24  --    BILITOT 0.3  --       Lab Results   Component Value Date    .0 (H) 12/28/2018    CALCIUM 9.5 01/15/2020    PHOS 4.2 01/15/2020      Lab Results   Component Value Date    IRON 35 07/17/2019    TIBC 423 07/17/2019    FERRITIN 148 07/17/2019        ABG  No results for input(s): PH, PO2, PCO2, HCO3, BE in the last 168 hours.      IMAGING:  CXR    ASSESSMENT / PLAN  CKD 5  UA no protein  Hyponatremia  Metabolic acidosis  Metabolic bone disease  Anemia Hb 9.3  Renal cap  Poor nutrition  Supplements  Renal diet  Echo     · Normal left ventricular systolic function. The estimated ejection fraction is 55%  · Grade II (moderate) left ventricular diastolic dysfunction consistent with pseudonormalization.  · Elevated left atrial pressure.  · No wall motion abnormalities.  · Normal right ventricular systolic function.  · No TR Jet to calculate PA pressure  · Intermediate central venous pressure (8 mm Hg).  · There is a large left pleural effusion.     /79  Weight daily  I and O  Avoid hypotension, hypovolemia, nephrotoxic agents  Needs AVF placement  Consult Dr Louie  Venous mapping

## 2020-01-15 NOTE — ED PROVIDER NOTES
Encounter Date: 1/14/2020    SCRIBE #1 NOTE: I, Ashish Maria De Jesus, am scribing for, and in the presence of, Miranda Gill MD.     I, Dr. Miranda Gill MD, personally performed the services described in this documentation. All medical record entries made by the scribe were at my direction and in my presence.  I have reviewed the chart and agree that the record reflects my personal performance and is accurate and complete. Miranda Gill MD.    History     Chief Complaint   Patient presents with    Abnormal Lab     pt sent from MD's office for possible admission after having blood drawn today with increase BUN and  creatine      CHIEF COMPLAINT: Abnormal lab result    HISTORY OF PRESENT ILLNESS: Katie Quevedo who is a 74 y.o. presents to the emergency department today with complaint of abnormal lab result. Patient was referred to the ED for possible admission after she had labs drawn in clinic with Dr. Gambino today. Blood work showed decreased renal function. She has had some N/V for the last 2-3 days, it was very bad on the 11th, unable to take in any p.o..  It seems to be getting slightly better although she still has some nausea vomiting. Patient feels weak.  Patient denies any chest pain, shortness of breath, abdominal pain. She has had decreased urine production. No fever, chills or sweats.    ALLERGIES REVIEWED  MEDICATIONS REVIEWED  PMH/PSH/SOC/FH REVIEWED     Nursing/Ancillary staff note reviewed.      The history is provided by the patient and the spouse.     Review of patient's allergies indicates:   Allergen Reactions    Aspirin      Other reaction(s): hx of ulcers    Tetracycline Swelling     Other reaction(s): Swelling    Penicillins Rash     Other reaction(s): Hives  Other reaction(s): Rash  Other reaction(s): Rash  Other reaction(s): Hives     Past Medical History:   Diagnosis Date    Anemia     Asthma     Bilateral renal cysts     Cataract     Chronic LBP 7/26/2012    Chronic pain     CKD  (chronic kidney disease), stage IV     COPD (chronic obstructive pulmonary disease)     Dehydration     Encounter for blood transfusion     HTN (hypertension)     Lumbar spondylosis     Melanoma     of the lip    Metabolic bone disease     Migraines, neuralgic     Osteoporosis     Primary osteoarthritis of both knees     s/p Rt TKA    Pulmonary embolism with infarction     Seizures     Subdeltoid bursitis, L>R. 9/27/2012    Ulcer     Vitamin D deficiency disease      Past Surgical History:   Procedure Laterality Date    BLADDER SUSPENSION      CATARACT EXTRACTION  11/18/13    left eye    CERVICAL LAMINECTOMY      x3, fusion x1    COLONOSCOPY  2009    HYSTERECTOMY      JOINT REPLACEMENT  2001    total right knee     LUMBAR LAMINECTOMY      x 3, fusion x1    OOPHORECTOMY       Family History   Problem Relation Age of Onset    Arthritis Mother     Stroke Mother     Hypertension Father     Cancer Father     Cataracts Father     Diabetes Maternal Aunt     Hypertension Maternal Grandfather     Heart disease Maternal Grandfather     Heart attack Maternal Grandfather     Cataracts Sister     Glaucoma Cousin      Social History     Tobacco Use    Smoking status: Former Smoker     Types: Cigarettes    Smokeless tobacco: Never Used   Substance Use Topics    Alcohol use: Yes     Alcohol/week: 1.0 standard drinks     Types: 1 Glasses of wine per week     Comment: Rare    Drug use: No     Review of Systems   Constitutional: Negative for appetite change, chills, diaphoresis and fever.   HENT: Negative for congestion, postnasal drip, sneezing and trouble swallowing.    Eyes: Negative.    Respiratory: Negative for cough, shortness of breath, wheezing and stridor.    Cardiovascular: Negative for chest pain.   Gastrointestinal: Positive for nausea and vomiting. Negative for abdominal distention, blood in stool, constipation and diarrhea.   Genitourinary: Positive for decreased urine volume.  Negative for difficulty urinating, dysuria, flank pain, frequency, hematuria and urgency.   Musculoskeletal: Negative for back pain and myalgias.   Skin: Negative for rash.   Neurological: Positive for weakness (generalized). Negative for tremors and headaches.   All other systems reviewed and are negative.      Physical Exam     Initial Vitals [01/14/20 1748]   BP Pulse Resp Temp SpO2   (!) 131/59 90 18 97.7 °F (36.5 °C) 95 %      MAP       --         Physical Exam    Nursing note and vitals reviewed.  Constitutional: She appears well-developed and well-nourished.   HENT:   Head: Normocephalic and atraumatic.   Right Ear: External ear normal.   Left Ear: External ear normal.   Nose: Nose normal.   Mouth/Throat: Mucous membranes are dry.   Eyes: Conjunctivae and EOM are normal. Pupils are equal, round, and reactive to light. No scleral icterus.   Neck: Normal range of motion. Neck supple. No JVD present.   Cardiovascular: Normal rate, regular rhythm, normal heart sounds and intact distal pulses. Exam reveals no gallop and no friction rub.    No murmur heard.  Pulmonary/Chest: Breath sounds normal. No stridor. No respiratory distress. She has no wheezes. She exhibits no tenderness.   Abdominal: Soft. Bowel sounds are normal. She exhibits no distension and no mass. There is no tenderness. There is no rebound and no guarding.   Musculoskeletal: Normal range of motion. She exhibits no edema or tenderness.   Back is nontender to palpation.    Neurological: She is alert and oriented to person, place, and time. She has normal strength. No cranial nerve deficit.   Skin: Skin is warm and dry. Capillary refill takes less than 2 seconds. No rash noted. No pallor.   Psychiatric: She has a normal mood and affect. Thought content normal.         ED Course   Procedures  Labs Reviewed   CBC W/ AUTO DIFFERENTIAL - Abnormal; Notable for the following components:       Result Value    RBC 3.38 (*)     Hemoglobin 10.0 (*)      Hematocrit 32.0 (*)     Mean Corpuscular Hemoglobin Conc 31.3 (*)     RDW 15.0 (*)     All other components within normal limits   COMPREHENSIVE METABOLIC PANEL - Abnormal; Notable for the following components:    Sodium 134 (*)     CO2 22 (*)     BUN, Bld 45 (*)     Creatinine 3.8 (*)     Alkaline Phosphatase 161 (*)     eGFR if  13 (*)     eGFR if non  11 (*)     All other components within normal limits   URINALYSIS         EK beats per minute, normal sinus rhythm, no ST elevations, no peaked T-waves, no arrhythmia.  Read by myself read by Cardiology pending     Medical Decision Making:   Initial Assessment:   This is 74-year-old female presents to the emergency department today after having abnormal labs at her PCP.  Patient has acute on chronic kidney failure.  She had a slightly elevated potassium.  This acute on chronic renal failure may be due to dehydration secondary to the fact the patient has had nausea vomiting for last several days but she also has known RF with consultations for dialysis access in process. I will speak with Dr. Aura Diggs who is her nephrologist regarding the patient's workup and obtain her recommendations.  Differential Diagnosis:   Dehydration, worsening renal failure, renal stenosis, uncontrolled hypertension, uncontrolled diabetes, endocrine abnormalities, sepsis.    Clinical Tests:   Lab Tests: Reviewed and Ordered  Radiological Study: Reviewed and Ordered  Medical Tests: Reviewed and Ordered  ED Management:  This is a 74 female who presents to the ED today with acute on chronic renal failure. I have discussed the pts workup with Dr Diggs who would like to have the pt admitted for gentle fluid hydration, she recommends LR at 100mL/hr to see if this helps with her acute on chronic renal failure. They will monitor her labs. I spoke with Dr Don re:the pts presentation and the recommendations from Dr Diggs, she accepts for admission.     Other:   I discussed test(s) with the performing physician.       <> Summary of the Findings: I spoke with Dr Aura Diggs who recommends fluid hydration and admission.     I spoke with Dr Don with [facility] who accepts for admission.                    ED Course as of Jan 14 2211 Tue Jan 14, 2020 2211 BUN, Bld(!): 45 [JA]   2211 Creatinine(!): 3.8 [JA]   2211 eGFR if non (!): 11 [JA]   2211 Potassium: 4.7 [JA]      ED Course User Index  [JA] Miranda Gill MD                Clinical Impression:       ICD-10-CM ICD-9-CM   1. Acute renal failure superimposed on chronic kidney disease, unspecified CKD stage, unspecified acute renal failure type N17.9 584.9    N18.9 585.9   2. Fatigue R53.83 780.79   3. YOSVANY (acute kidney injury) N17.9 584.9                               Miranda Gill MD  01/14/20 1945       Miranda Gill MD  01/14/20 2008       Miranda Gill MD  01/14/20 2211

## 2020-01-15 NOTE — PROGRESS NOTES
Nurse notified by tech that patient was not wearing oxygen upon entering room for 0400 vital signs. Tech placed NC back onto patient. Nurse instructed patient in regards to using her oxygen as prescribed. Pt verbalized understanding.

## 2020-01-15 NOTE — ASSESSMENT & PLAN NOTE
Essential hypertension    Does not appear fluid overloaded on exam  Denies chest pain or SOB  Hold lasix secondary to abnormal kidney function  Continuous cardiac monitoring  Continue amlodipine  Daily weight  monitor

## 2020-01-15 NOTE — ED TRIAGE NOTES
Pt here with reports of abnormal lab. Pt reports she was seen at her doctors office today and had blood work done. Pt reports she was called and told to come to ED for elevated BUN and creatine. Pt reports having bilateral flank pain and decreased urine output x 1 day.

## 2020-01-15 NOTE — ASSESSMENT & PLAN NOTE
COPD (chronic obstructive pulmonary disease)    Continue home O2  Nitin prn  Hold Trelegy for now 2/2 not on hospital formulary

## 2020-01-15 NOTE — SUBJECTIVE & OBJECTIVE
Interval History: She is in bed drinking PJs coffee. Family at the bedside, no distress noted.    Review of Systems   Constitutional: Negative for appetite change, chills, fatigue and fever.   HENT: Negative for trouble swallowing.    Eyes: Negative for visual disturbance.   Respiratory: Negative for shortness of breath.    Cardiovascular: Negative for chest pain, palpitations and leg swelling.   Gastrointestinal: Negative for blood in stool, diarrhea, nausea and vomiting.   Genitourinary: Negative for difficulty urinating and hematuria.   Musculoskeletal: Negative for gait problem.   Skin: Negative for wound.   Neurological: Negative for dizziness, weakness and light-headedness.   Hematological: Does not bruise/bleed easily.   Psychiatric/Behavioral: Negative for agitation, confusion and hallucinations.     Objective:     Vital Signs (Most Recent):  Temp: 99 °F (37.2 °C) (01/15/20 1129)  Pulse: 97 (01/15/20 1154)  Resp: 18 (01/15/20 1129)  BP: 121/83 (01/15/20 1129)  SpO2: 95 % (01/15/20 1500) Vital Signs (24h Range):  Temp:  [97.7 °F (36.5 °C)-99 °F (37.2 °C)] 99 °F (37.2 °C)  Pulse:  [80-97] 97  Resp:  [18-22] 18  SpO2:  [93 %-100 %] 95 %  BP: (119-149)/(56-83) 121/83     Weight: 63.5 kg (139 lb 15.9 oz)  Body mass index is 25.6 kg/m².    Intake/Output Summary (Last 24 hours) at 1/15/2020 1543  Last data filed at 1/15/2020 1400  Gross per 24 hour   Intake 1698.75 ml   Output 1550 ml   Net 148.75 ml      Physical Exam   Constitutional: She is oriented to person, place, and time. No distress.   HENT:   Head: Normocephalic.   Eyes: Pupils are equal, round, and reactive to light.   Neck: Normal range of motion. Neck supple.   Cardiovascular: Normal rate.   Pulmonary/Chest: Effort normal and breath sounds normal.   Abdominal: Soft. Bowel sounds are normal.   Musculoskeletal: She exhibits no edema or tenderness.   Neurological: She is alert and oriented to person, place, and time.   Skin: Skin is warm and dry.  Capillary refill takes less than 2 seconds. She is not diaphoretic.   Psychiatric: She has a normal mood and affect. Her behavior is normal. Judgment and thought content normal.   Nursing note and vitals reviewed.      Significant Labs:   BMP:   Recent Labs   Lab 01/15/20  0623   GLU 74   *   K 4.6   CL 99   CO2 22*   BUN 41*   CREATININE 3.3*   CALCIUM 9.5   MG 2.3     CBC:   Recent Labs   Lab 01/14/20  2000 01/15/20  0623   WBC 8.34 7.40   HGB 10.0* 9.3*   HCT 32.0* 29.8*    289       Significant Imaging: I have reviewed all pertinent imaging results/findings within the past 24 hours.

## 2020-01-15 NOTE — H&P
[facility] Hospital Medicine  History & Physical    Patient Name: Katie Quevedo  MRN: [id number]  Admission Date: 1/14/2020  Attending Physician: Jennifer Bowles*   Primary Care Provider: Kingston Verduzco MD         Patient information was obtained from patient and ER records.     Subjective:     Principal Problem:Acute kidney injury superimposed on CKD    Chief Complaint:   Chief Complaint   Patient presents with    Abnormal Lab     pt sent from MD's office for possible admission after having blood drawn today with increase BUN and  creatine         HPI: 74 y.o. female with past medical history of anemia, asthma, bilateral renal cysts, chronic pain, CKD, COPD on home O2, dehydration, HTN, lumbar spondylosis, migraines, osteoporosis, osteoarthritis, pulmonary embolism, and seizures who presents to the emergency department today with complaint of abnormal lab result. Patient was referred to the ED for possible admission after she had labs drawn in clinic with Dr. Gambino today. Blood work showed abnormal renal function. Recent baseline creatinine 2.2-2.5, but today it has increased to 3.8.  She has had some nausea and vomiting for the last 2-3 days, unable to take in anything oral.  She reports nausea and vomiting has improved but is still present.  Patient reports associated weakness.  Denies any chest pain, shortness of breath, abdominal pain, fever, chills, no change in bowel habits. She has had decreased urine production. Patient being admitted to hospital medicine under observation status for further medical management.    Past Medical History:   Diagnosis Date    Anemia     Asthma     Bilateral renal cysts     Cataract     Chronic LBP 7/26/2012    Chronic pain     CKD (chronic kidney disease), stage IV     COPD (chronic obstructive pulmonary disease)     Dehydration     Encounter for blood transfusion     HTN (hypertension)     Lumbar spondylosis     Melanoma     of the lip     Metabolic bone disease     Migraines, neuralgic     Osteoporosis     Primary osteoarthritis of both knees     s/p Rt TKA    Pulmonary embolism with infarction     Seizures     Subdeltoid bursitis, L>R. 9/27/2012    Ulcer     Vitamin D deficiency disease        Past Surgical History:   Procedure Laterality Date    BLADDER SUSPENSION      CATARACT EXTRACTION  11/18/13    left eye    CERVICAL LAMINECTOMY      x3, fusion x1    COLONOSCOPY  2009    HYSTERECTOMY      JOINT REPLACEMENT  2001    total right knee     LUMBAR LAMINECTOMY      x 3, fusion x1    OOPHORECTOMY         Review of patient's allergies indicates:   Allergen Reactions    Aspirin      Other reaction(s): hx of ulcers    Tetracycline Swelling     Other reaction(s): Swelling    Penicillins Rash     Other reaction(s): Hives  Other reaction(s): Rash  Other reaction(s): Rash  Other reaction(s): Hives       No current facility-administered medications on file prior to encounter.      Current Outpatient Medications on File Prior to Encounter   Medication Sig    albuterol (PROVENTIL) 2.5 mg /3 mL (0.083 %) nebulizer solution Take 3 mLs (2.5 mg total) by nebulization every 6 (six) hours as needed for Wheezing. Rescue    albuterol-ipratropium (DUO-NEB) 2.5 mg-0.5 mg/3 mL nebulizer solution Take 3 mLs by nebulization every 6 (six) hours as needed for Shortness of Breath. Rescue    allopurinol (ZYLOPRIM) 100 MG tablet Take 1 tablet (100 mg total) by mouth once daily. Take 100 mg -on Monday and Friday only    amLODIPine (NORVASC) 10 MG tablet Take 10 mg by mouth once daily.    busPIRone (BUSPAR) 5 MG Tab Take 5 mg by mouth.    calcium-vitamin D3 (CALCIUM 500 + D) 500 mg(1,250mg) -200 unit per tablet Take 1 tablet by mouth 2 (two) times daily with meals.    diazePAM (VALIUM) 2 MG tablet Take 2 mg by mouth.    DULoxetine (CYMBALTA) 30 MG capsule TAKE 1 CAPSULE BY MOUTH EVERY DAY    ergocalciferol (ERGOCALCIFEROL) 50,000 unit Cap TAKE 1  CAPSULE BY MOUTH EVERY 7 DAYS, FOR 12 WEEKS, THEN MONTHLY    fluticasone-umeclidin-vilanter (TRELEGY ELLIPTA) 100-62.5-25 mcg DsDv Inhale 1 puff by mouth into the lungs once daily.    furosemide (LASIX) 20 MG tablet Take 1 tablet (20 mg total) by mouth 2 (two) times daily.    ondansetron (ZOFRAN-ODT) 4 MG TbDL Dissolve one tablet under the tongue every 6 (six) hours as needed.    sodium bicarbonate 650 MG tablet Take 1 tablet (650 mg total) by mouth 2 (two) times daily.    baclofen (LIORESAL) 10 MG tablet TAKE 1 TABLET (10 MG TOTAL) BY MOUTH 3 (THREE) TIMES DAILY AS NEEDED (MUSCLE SPASMS).    HYDROcodone-acetaminophen (NORCO)  mg per tablet Take 1 tablet by mouth 3 (three) times daily as needed.    HYDROcodone-acetaminophen (NORCO)  mg per tablet Take 1 tablet by mouth 3 (three) times daily as needed.    traZODone (DESYREL) 50 MG tablet Take 1 tablet (50 mg total) by mouth every evening.    zolpidem (AMBIEN) 5 MG Tab Take 5 mg by mouth once daily.    [DISCONTINUED] fluticasone-umeclidin-vilanter (TRELEGY ELLIPTA) 100-62.5-25 mcg DsDv Inhale 1 puff into the lungs once daily.    [DISCONTINUED] furosemide (LASIX) 20 MG tablet Take 1 tablet (20 mg total) by mouth 2 (two) times daily.    [DISCONTINUED] SPIRIVA RESPIMAT 2.5 mcg/actuation Mist      Family History     Problem Relation (Age of Onset)    Arthritis Mother    Cancer Father    Cataracts Father, Sister    Diabetes Maternal Aunt    Glaucoma Cousin    Heart attack Maternal Grandfather    Heart disease Maternal Grandfather    Hypertension Father, Maternal Grandfather    Stroke Mother        Tobacco Use    Smoking status: Former Smoker     Types: Cigarettes    Smokeless tobacco: Never Used   Substance and Sexual Activity    Alcohol use: Yes     Alcohol/week: 1.0 standard drinks     Types: 1 Glasses of wine per week     Comment: Rare    Drug use: No    Sexual activity: Never     Review of Systems   Constitutional: Negative for chills and  fever.   HENT: Negative for congestion, rhinorrhea and sinus pain.    Eyes: Negative for visual disturbance.   Respiratory: Negative for chest tightness and shortness of breath.    Cardiovascular: Negative for chest pain and palpitations.   Gastrointestinal: Positive for nausea and vomiting. Negative for abdominal distention and abdominal pain.   Genitourinary: Positive for decreased urine volume.   Musculoskeletal: Negative for arthralgias and myalgias.   Skin: Negative for color change and wound.   Neurological: Positive for weakness. Negative for light-headedness and headaches.   Hematological: Does not bruise/bleed easily.   Psychiatric/Behavioral: Negative for agitation.     Objective:     Vital Signs (Most Recent):  Temp: 97.7 °F (36.5 °C) (01/14/20 1748)  Pulse: 81 (01/14/20 2103)  Resp: 18 (01/14/20 1748)  BP: 132/70 (01/14/20 2103)  SpO2: 96 % (01/14/20 2103) Vital Signs (24h Range):  Temp:  [97.5 °F (36.4 °C)-97.7 °F (36.5 °C)] 97.7 °F (36.5 °C)  Pulse:  [81-92] 81  Resp:  [18] 18  SpO2:  [95 %-96 %] 96 %  BP: (115-132)/(59-70) 132/70     Weight: 59 kg (130 lb)  Body mass index is 23.4 kg/m².    Physical Exam   Constitutional: She appears well-developed and well-nourished.   HENT:   Head: Normocephalic and atraumatic.   Eyes: Pupils are equal, round, and reactive to light. EOM are normal.   Neck: Normal range of motion. Neck supple.   Cardiovascular: Normal rate.   No murmur heard.  Pulmonary/Chest: Effort normal. No respiratory distress.   Abdominal: Soft. Bowel sounds are normal.   Musculoskeletal: Normal range of motion. She exhibits no deformity.   Skin: Skin is warm and dry.   Psychiatric: She has a normal mood and affect. Her behavior is normal.         CRANIAL NERVES     CN III, IV, VI   Pupils are equal, round, and reactive to light.  Extraocular motions are normal.        Significant Labs:   BMP:   Recent Labs   Lab 01/14/20 2000   GLU 97   *   K 4.7   CL 99   CO2 22*   BUN 45*   CREATININE  3.8*   CALCIUM 9.7     CBC:   Recent Labs   Lab 01/14/20 2000   WBC 8.34   HGB 10.0*   HCT 32.0*        CMP:   Recent Labs   Lab 01/14/20  1440 01/14/20 2000   * 134*   K 5.2* 4.7    99   CO2 24 22*   GLU 87 97   BUN 43* 45*   CREATININE 3.8* 3.8*   CALCIUM 9.7 9.7   PROT 7.9 8.4   ALBUMIN 3.8 4.0   BILITOT 0.2 0.3   ALKPHOS 150* 161*   AST 19 24   ALT 8* 10   ANIONGAP 11 13   EGFRNONAA 11* 11*       Lipase:   Recent Labs   Lab 01/14/20  1440   LIPASE 31       Significant Imaging: I have reviewed all pertinent imaging results/findings within the past 24 hours.    Assessment/Plan:     * Acute kidney injury superimposed on CKD  YOSVANY (acute kidney injury)  CKD (chronic kidney disease) stage 4, GFR 15-29 ml/min    Baseline creatinine 2.2-2.5> currently 3.8  Patient reports decreased urine  Start IVFs  Avoid nephrotoxic agents >hold Lasix, Cymbalta and Buspar  Strict I/O's  Renally dose medications   Consult Nephrology: pending  Monitor    Non-intractable vomiting with nausea  Zofran prn  IVFs  Monitor      CKD (chronic kidney disease) stage 4, GFR 15-29 ml/min            Chronic diastolic heart failure  Essential hypertension    Does not appear fluid overloaded on exam  Denies chest pain or SOB  Hold lasix secondary to abnormal kidney function  Continuous cardiac monitoring  Continue amlodipine  Daily weight  monitor            Chronic respiratory failure with hypoxia, on home O2 therapy  COPD (chronic obstructive pulmonary disease)    Continue home O2  Duonebs prn  Hold Trelegy for now 2/2 not on hospital formulary      Iron deficiency anemia  Monitor      Chronic low back pain  Continue home Baclofen and Norco        VTE Risk Mitigation (From admission, onward)         Ordered     enoxaparin injection 30 mg  Daily      01/14/20 2212     Place DAWN hose  Until discontinued      01/14/20 1842     Place sequential compression device  Until discontinued      01/14/20 1842     IP VTE HIGH RISK PATIENT   Once      01/14/20 1842                   Kandy Man NP  Department of Hospital Medicine   [facility]

## 2020-01-16 ENCOUNTER — TELEPHONE (OUTPATIENT)
Dept: FAMILY MEDICINE | Facility: CLINIC | Age: 75
End: 2020-01-16

## 2020-01-16 VITALS
WEIGHT: 151 LBS | DIASTOLIC BLOOD PRESSURE: 57 MMHG | SYSTOLIC BLOOD PRESSURE: 109 MMHG | HEIGHT: 62 IN | RESPIRATION RATE: 20 BRPM | BODY MASS INDEX: 27.79 KG/M2 | OXYGEN SATURATION: 99 % | TEMPERATURE: 98 F | HEART RATE: 106 BPM

## 2020-01-16 DIAGNOSIS — N18.9 ACUTE KIDNEY INJURY SUPERIMPOSED ON CKD: Primary | ICD-10-CM

## 2020-01-16 DIAGNOSIS — N17.9 ACUTE KIDNEY INJURY SUPERIMPOSED ON CKD: Primary | ICD-10-CM

## 2020-01-16 LAB
ANION GAP SERPL CALC-SCNC: 9 MMOL/L (ref 8–16)
BASOPHILS # BLD AUTO: 0.02 K/UL (ref 0–0.2)
BASOPHILS NFR BLD: 0.3 % (ref 0–1.9)
BUN SERPL-MCNC: 36 MG/DL (ref 8–23)
CALCIUM SERPL-MCNC: 9.7 MG/DL (ref 8.7–10.5)
CHLORIDE SERPL-SCNC: 102 MMOL/L (ref 95–110)
CO2 SERPL-SCNC: 24 MMOL/L (ref 23–29)
CREAT SERPL-MCNC: 2.8 MG/DL (ref 0.5–1.4)
DIFFERENTIAL METHOD: ABNORMAL
EOSINOPHIL # BLD AUTO: 0.7 K/UL (ref 0–0.5)
EOSINOPHIL NFR BLD: 9.5 % (ref 0–8)
ERYTHROCYTE [DISTWIDTH] IN BLOOD BY AUTOMATED COUNT: 14.7 % (ref 11.5–14.5)
EST. GFR  (AFRICAN AMERICAN): 18 ML/MIN/1.73 M^2
EST. GFR  (NON AFRICAN AMERICAN): 16 ML/MIN/1.73 M^2
GLUCOSE SERPL-MCNC: 102 MG/DL (ref 70–110)
HCT VFR BLD AUTO: 29.8 % (ref 37–48.5)
HGB BLD-MCNC: 9.3 G/DL (ref 12–16)
LYMPHOCYTES # BLD AUTO: 1.6 K/UL (ref 1–4.8)
LYMPHOCYTES NFR BLD: 23.2 % (ref 18–48)
MAGNESIUM SERPL-MCNC: 2.4 MG/DL (ref 1.6–2.6)
MCH RBC QN AUTO: 28.9 PG (ref 27–31)
MCHC RBC AUTO-ENTMCNC: 31.2 G/DL (ref 32–36)
MCV RBC AUTO: 93 FL (ref 82–98)
MONOCYTES # BLD AUTO: 0.9 K/UL (ref 0.3–1)
MONOCYTES NFR BLD: 12.8 % (ref 4–15)
NEUTROPHILS # BLD AUTO: 3.8 K/UL (ref 1.8–7.7)
NEUTROPHILS NFR BLD: 54.2 % (ref 38–73)
PHOSPHATE SERPL-MCNC: 4.5 MG/DL (ref 2.7–4.5)
PLATELET # BLD AUTO: 275 K/UL (ref 150–350)
PMV BLD AUTO: 9.6 FL (ref 9.2–12.9)
POCT GLUCOSE: 95 MG/DL (ref 70–110)
POTASSIUM SERPL-SCNC: 5.3 MMOL/L (ref 3.5–5.1)
POTASSIUM SERPL-SCNC: 5.5 MMOL/L (ref 3.5–5.1)
POTASSIUM SERPL-SCNC: 5.7 MMOL/L (ref 3.5–5.1)
RBC # BLD AUTO: 3.22 M/UL (ref 4–5.4)
SODIUM SERPL-SCNC: 135 MMOL/L (ref 136–145)
WBC # BLD AUTO: 7.04 K/UL (ref 3.9–12.7)

## 2020-01-16 PROCEDURE — 84132 ASSAY OF SERUM POTASSIUM: CPT | Mod: 91,HCNC

## 2020-01-16 PROCEDURE — 25000242 PHARM REV CODE 250 ALT 637 W/ HCPCS: Mod: HCNC | Performed by: NURSE PRACTITIONER

## 2020-01-16 PROCEDURE — 85025 COMPLETE CBC W/AUTO DIFF WBC: CPT | Mod: HCNC

## 2020-01-16 PROCEDURE — 96372 THER/PROPH/DIAG INJ SC/IM: CPT | Mod: 59

## 2020-01-16 PROCEDURE — G0378 HOSPITAL OBSERVATION PER HR: HCPCS | Mod: HCNC

## 2020-01-16 PROCEDURE — 63600175 PHARM REV CODE 636 W HCPCS: Mod: HCNC | Performed by: INTERNAL MEDICINE

## 2020-01-16 PROCEDURE — 25000003 PHARM REV CODE 250: Mod: HCNC | Performed by: NURSE PRACTITIONER

## 2020-01-16 PROCEDURE — 83735 ASSAY OF MAGNESIUM: CPT | Mod: HCNC

## 2020-01-16 PROCEDURE — 94640 AIRWAY INHALATION TREATMENT: CPT | Mod: HCNC

## 2020-01-16 PROCEDURE — 80048 BASIC METABOLIC PNL TOTAL CA: CPT | Mod: HCNC

## 2020-01-16 PROCEDURE — 96374 THER/PROPH/DIAG INJ IV PUSH: CPT | Mod: 59

## 2020-01-16 PROCEDURE — 25000242 PHARM REV CODE 250 ALT 637 W/ HCPCS: Mod: HCNC | Performed by: INTERNAL MEDICINE

## 2020-01-16 PROCEDURE — 94761 N-INVAS EAR/PLS OXIMETRY MLT: CPT | Mod: HCNC

## 2020-01-16 PROCEDURE — 25000003 PHARM REV CODE 250: Mod: HCNC | Performed by: FAMILY MEDICINE

## 2020-01-16 PROCEDURE — 36415 COLL VENOUS BLD VENIPUNCTURE: CPT | Mod: HCNC

## 2020-01-16 PROCEDURE — 84100 ASSAY OF PHOSPHORUS: CPT | Mod: HCNC

## 2020-01-16 PROCEDURE — 27000221 HC OXYGEN, UP TO 24 HOURS: Mod: HCNC

## 2020-01-16 PROCEDURE — 25000003 PHARM REV CODE 250: Mod: HCNC | Performed by: INTERNAL MEDICINE

## 2020-01-16 RX ORDER — ALBUTEROL SULFATE 2.5 MG/.5ML
10 SOLUTION RESPIRATORY (INHALATION) ONCE
Status: COMPLETED | OUTPATIENT
Start: 2020-01-16 | End: 2020-01-16

## 2020-01-16 RX ADMIN — HYDROCODONE BITARTRATE AND ACETAMINOPHEN 1 TABLET: 10; 325 TABLET ORAL at 01:01

## 2020-01-16 RX ADMIN — ONDANSETRON 4 MG: 4 TABLET, ORALLY DISINTEGRATING ORAL at 01:01

## 2020-01-16 RX ADMIN — IPRATROPIUM BROMIDE AND ALBUTEROL SULFATE 3 ML: .5; 3 SOLUTION RESPIRATORY (INHALATION) at 04:01

## 2020-01-16 RX ADMIN — HYDROCODONE BITARTRATE AND ACETAMINOPHEN 1 TABLET: 10; 325 TABLET ORAL at 08:01

## 2020-01-16 RX ADMIN — CALCIUM GLUCONATE 1000 MG: 98 INJECTION, SOLUTION INTRAVENOUS at 12:01

## 2020-01-16 RX ADMIN — CALCIUM CARBONATE (ANTACID) CHEW TAB 500 MG 500 MG: 500 CHEW TAB at 08:01

## 2020-01-16 RX ADMIN — AMLODIPINE BESYLATE 2.5 MG: 2.5 TABLET ORAL at 10:01

## 2020-01-16 RX ADMIN — ALLOPURINOL 100 MG: 100 TABLET ORAL at 08:01

## 2020-01-16 RX ADMIN — IPRATROPIUM BROMIDE AND ALBUTEROL SULFATE 3 ML: .5; 3 SOLUTION RESPIRATORY (INHALATION) at 08:01

## 2020-01-16 RX ADMIN — EPOETIN ALFA-EPBX 20000 UNITS: 10000 INJECTION, SOLUTION INTRAVENOUS; SUBCUTANEOUS at 04:01

## 2020-01-16 RX ADMIN — ALBUTEROL SULFATE 10 MG: 2.5 SOLUTION RESPIRATORY (INHALATION) at 12:01

## 2020-01-16 RX ADMIN — SODIUM POLYSTYRENE SULFONATE 30 G: 15 SUSPENSION ORAL; RECTAL at 10:01

## 2020-01-16 NOTE — PLAN OF CARE
Pt on oxygen in no apparent distress.  Breathing tx. Given with ok pt. Effort.  Will cont. To monitor.

## 2020-01-16 NOTE — PLAN OF CARE
Patient on oxygen with documented flow per home therapy  Patient given aerosol treatment with no adverse reactions noted. Patient instructed on proper use.  Will continue to monitor.

## 2020-01-16 NOTE — TELEPHONE ENCOUNTER
----- Message from Disha Guerrero RN sent at 1/16/2020  3:20 PM CST -----  Hello,  Pt is discharging from [facility] today and will need a hospital follow-up appt please.  Please contact pt to schedule the appt.  Thank you for your help,  Darrian Guerrero RN,   [phone]

## 2020-01-16 NOTE — ASSESSMENT & PLAN NOTE
YOSVANY (acute kidney injury)  CKD (chronic kidney disease) stage 4, GFR 15-29 ml/min    Baseline creatinine 2.2-2.5> currently 3.8>3.3>2.8  Patient reports decreased urine  Cont IVFs  Avoid nephrotoxic agents >hold Lasix, Cymbalta and Buspar  Strict I/O's  Renally dose medications   We appreciate Nephrology  Monitor

## 2020-01-16 NOTE — PROGRESS NOTES
Pharmacy New Medication Education    Patient and/or Caregiver ACCEPTED medication education.    Pharmacy has provided education on the name, indication, and possible side effects of the medication(s) prescribed, using teach-back method.     Learners of pharmacy medication education includes:  patient    The following medications have also been discussed, during this admission.     Current Facility-Administered Medications   Medication Frequency    acetaminophen tablet 650 mg Q4H PRN    albuterol-ipratropium 2.5 mg-0.5 mg/3 mL nebulizer solution 3 mL Q4H PRN    allopurinol tablet 100 mg Daily    amLODIPine tablet 2.5 mg Daily    calcium carbonate 200 mg calcium (500 mg) chewable tablet 500 mg BID    enoxaparin injection 30 mg Daily    HYDROcodone-acetaminophen  mg per tablet 1 tablet TID PRN    lidocaine (PF) 10 mg/ml (1%) injection 10 mg Once    lidocaine 5 % patch 1 patch Q24H    ondansetron disintegrating tablet 4 mg Q6H PRN    ondansetron injection 4 mg Q6H PRN    sodium chloride 0.9% flush 10 mL PRN          Thank you  Jerardo Pretty, PharmD

## 2020-01-16 NOTE — DISCHARGE INSTRUCTIONS
Continue to hold Lasix, Cymbalta and Buspar until 01/19/2020. Make sure you stay hydrated with water.

## 2020-01-16 NOTE — TELEPHONE ENCOUNTER
Patient scheduled with [facility].  Patient will be seen by Dr Gambino first and once discharged will be scheduled for follow-up with Dr Verduzco.

## 2020-01-16 NOTE — SUBJECTIVE & OBJECTIVE
Interval History: she is doing well this morning. She states she is ready to go. The patients  is at the bedside, will follow.     Review of Systems   Constitutional: Negative for activity change, appetite change, diaphoresis, fatigue and fever.   Eyes: Negative for visual disturbance.   Respiratory: Negative for shortness of breath.    Cardiovascular: Negative for chest pain, palpitations and leg swelling.   Gastrointestinal: Negative for abdominal pain, blood in stool, constipation, diarrhea, nausea and vomiting.   Genitourinary: Negative for difficulty urinating and hematuria.   Musculoskeletal: Negative for gait problem and myalgias.   Skin: Negative for color change.   Neurological: Negative for dizziness, weakness and light-headedness.   Hematological: Does not bruise/bleed easily.   Psychiatric/Behavioral: Negative for agitation, confusion and hallucinations.     Objective:     Vital Signs (Most Recent):  Temp: 98.4 °F (36.9 °C) (01/16/20 1126)  Pulse: 94 (01/16/20 1200)  Resp: (!) 22 (01/16/20 1200)  BP: 130/77 (01/16/20 1126)  SpO2: (!) 94 % (01/16/20 1200) Vital Signs (24h Range):  Temp:  [96.7 °F (35.9 °C)-98.5 °F (36.9 °C)] 98.4 °F (36.9 °C)  Pulse:  [] 94  Resp:  [14-23] 22  SpO2:  [92 %-100 %] 94 %  BP: (111-130)/(59-77) 130/77     Weight: 68.5 kg (151 lb 0.2 oz)  Body mass index is 27.62 kg/m².    Intake/Output Summary (Last 24 hours) at 1/16/2020 1205  Last data filed at 1/16/2020 1136  Gross per 24 hour   Intake 1080 ml   Output 2900 ml   Net -1820 ml      Physical Exam   Constitutional: She is oriented to person, place, and time. She appears well-developed and well-nourished.   Eyes: Pupils are equal, round, and reactive to light.   Neck: Normal range of motion.   Cardiovascular: Normal rate.   Pulmonary/Chest: Effort normal and breath sounds normal.   Abdominal: Soft. Bowel sounds are normal.   Musculoskeletal: Normal range of motion.   Neurological: She is alert and oriented to  person, place, and time.   Skin: Skin is warm and dry. Capillary refill takes less than 2 seconds.   Psychiatric: She has a normal mood and affect. Her behavior is normal. Judgment and thought content normal.   Nursing note and vitals reviewed.      Significant Labs:   CBC:   Recent Labs   Lab 01/14/20  2000 01/15/20  0623 01/16/20  0619   WBC 8.34 7.40 7.04   HGB 10.0* 9.3* 9.3*   HCT 32.0* 29.8* 29.8*    289 275     Magnesium:   Recent Labs   Lab 01/15/20  0623 01/16/20  0619   MG 2.3 2.4     POCT Glucose:   Recent Labs   Lab 01/16/20  0929   POCTGLUCOSE 95       Significant Imaging: I have reviewed all pertinent imaging results/findings within the past 24 hours.

## 2020-01-16 NOTE — ASSESSMENT & PLAN NOTE
YOSVANY (acute kidney injury)  CKD (chronic kidney disease) stage 4, GFR 15-29 ml/min    Baseline creatinine 2.2-2.5> currently 3.8>3.3>2.8  Patient reports decreased urine  Cont IVFs  Avoid nephrotoxic agents >hold Lasix, Cymbalta and Buspar for as few days on d/c. Hold until 01/19/2020  Strict I/O's  Renally dose medications   We appreciate Nephrology  Monitor

## 2020-01-16 NOTE — PROGRESS NOTES
[facility] Medicine  Progress Note    Patient Name: Katie Quevedo  MRN: [id number]  Patient Class: OP- Observation   Admission Date: 1/14/2020  Length of Stay: 0 days  Attending Physician: Jennifer Bowles*  Primary Care Provider: Kingston Verduzco MD        Subjective:     Principal Problem:Acute kidney injury superimposed on CKD        HPI:  74 y.o. female with past medical history of anemia, asthma, bilateral renal cysts, chronic pain, CKD, COPD on home O2, dehydration, HTN, lumbar spondylosis, migraines, osteoporosis, osteoarthritis, pulmonary embolism, and seizures who presents to the emergency department today with complaint of abnormal lab result. Patient was referred to the ED for possible admission after she had labs drawn in clinic with Dr. Gambino today. Blood work showed abnormal renal function. Recent baseline creatinine 2.2-2.5, but today it has increased to 3.8.  She has had some nausea and vomiting for the last 2-3 days, unable to take in anything oral.  She reports nausea and vomiting has improved but is still present.  Patient reports associated weakness.  Denies any chest pain, shortness of breath, abdominal pain, fever, chills, no change in bowel habits. She has had decreased urine production. Patient being admitted to hospital medicine under observation status for further medical management.    Overview/Hospital Course:  The patient was admitted to the [facility] service for further care. Nephrology was consulted. IV fluids were given. General surgery was consulted. Vein mapping completed while admitted---will plan AV fistula on Tuesday. Her creatinine has improved with fluids but her potassium is at 5.7. Will give Kayexelate PO once---nephrology has ordered calcium gluconate.     Interval History: she is doing well this morning. She states she is ready to go. The patients  is at the bedside, will follow.     Review of Systems   Constitutional:  Negative for activity change, appetite change, diaphoresis, fatigue and fever.   Eyes: Negative for visual disturbance.   Respiratory: Negative for shortness of breath.    Cardiovascular: Negative for chest pain, palpitations and leg swelling.   Gastrointestinal: Negative for abdominal pain, blood in stool, constipation, diarrhea, nausea and vomiting.   Genitourinary: Negative for difficulty urinating and hematuria.   Musculoskeletal: Negative for gait problem and myalgias.   Skin: Negative for color change.   Neurological: Negative for dizziness, weakness and light-headedness.   Hematological: Does not bruise/bleed easily.   Psychiatric/Behavioral: Negative for agitation, confusion and hallucinations.     Objective:     Vital Signs (Most Recent):  Temp: 98.4 °F (36.9 °C) (01/16/20 1126)  Pulse: 94 (01/16/20 1200)  Resp: (!) 22 (01/16/20 1200)  BP: 130/77 (01/16/20 1126)  SpO2: (!) 94 % (01/16/20 1200) Vital Signs (24h Range):  Temp:  [96.7 °F (35.9 °C)-98.5 °F (36.9 °C)] 98.4 °F (36.9 °C)  Pulse:  [] 94  Resp:  [14-23] 22  SpO2:  [92 %-100 %] 94 %  BP: (111-130)/(59-77) 130/77     Weight: 68.5 kg (151 lb 0.2 oz)  Body mass index is 27.62 kg/m².    Intake/Output Summary (Last 24 hours) at 1/16/2020 1205  Last data filed at 1/16/2020 1136  Gross per 24 hour   Intake 1080 ml   Output 2900 ml   Net -1820 ml      Physical Exam   Constitutional: She is oriented to person, place, and time. She appears well-developed and well-nourished.   Eyes: Pupils are equal, round, and reactive to light.   Neck: Normal range of motion.   Cardiovascular: Normal rate.   Pulmonary/Chest: Effort normal and breath sounds normal.   Abdominal: Soft. Bowel sounds are normal.   Musculoskeletal: Normal range of motion.   Neurological: She is alert and oriented to person, place, and time.   Skin: Skin is warm and dry. Capillary refill takes less than 2 seconds.   Psychiatric: She has a normal mood and affect. Her behavior is normal.  Judgment and thought content normal.   Nursing note and vitals reviewed.      Significant Labs:   CBC:   Recent Labs   Lab 01/14/20  2000 01/15/20  0623 01/16/20  0619   WBC 8.34 7.40 7.04   HGB 10.0* 9.3* 9.3*   HCT 32.0* 29.8* 29.8*    289 275     Magnesium:   Recent Labs   Lab 01/15/20  0623 01/16/20  0619   MG 2.3 2.4     POCT Glucose:   Recent Labs   Lab 01/16/20  0929   POCTGLUCOSE 95       Significant Imaging: I have reviewed all pertinent imaging results/findings within the past 24 hours.      Assessment/Plan:      * Acute kidney injury superimposed on CKD    Baseline creatinine 2.2-2.5> currently 3.8>3.3>2.8  Patient reports decreased urine  Cont IVFs  Avoid nephrotoxic agents >hold Lasix, Cymbalta and Buspar  Strict I/O's  Renally dose medications   We appreciate Nephrology  Monitor    Non-intractable vomiting with nausea  Zofran prn  IVFs  Monitor    Chronic diastolic heart failure  Essential hypertension    Does not appear fluid overloaded on exam  Denies chest pain or SOB  Hold lasix secondary to abnormal kidney function  Continuous cardiac monitoring  Continue amlodipine  Daily weight  monitor      Chronic respiratory failure with hypoxia, on home O2 therapy  COPD (chronic obstructive pulmonary disease)    Continue home O2  Duonebs prn  Hold Trelegy for now 2/2 not on hospital formulary      Hyperkalemia  K 5.7. kayexalate was ordered. Calcium gluconate was also ordered by nephrology.     Iron deficiency anemia  Monitor      Chronic low back pain  Continue home Baclofen and Norco        VTE Risk Mitigation (From admission, onward)         Ordered     enoxaparin injection 30 mg  Daily      01/14/20 2212     Place DAWN hose  Until discontinued      01/14/20 1842     Place sequential compression device  Until discontinued      01/14/20 1842     IP VTE HIGH RISK PATIENT  Once      01/14/20 1842                      Jere Mercado NP  Department of Hospital Medicine   [facility]

## 2020-01-16 NOTE — NURSING
[facility] clinician visited patient at bedside as [facility] is an [facility].  Informed her of her scheduled follow up appointment in [facility] and that she will need to have blood work drawn on 1/29/2020 after her cardiology appointment.  Informed her that if lab results were abnormal [facility] would contact her for sooner appointment than scheduled.  Patient and spouse verbalized understanding.

## 2020-01-16 NOTE — NURSING
Pt is AAOx3 in NAD, VSS, IV removed with catheter intact.  Telebox removed and returned to telemetry.  No complaints of pain at this time. Discharge instructions printed and given to pt with explanation/pt verbalized understanding.

## 2020-01-16 NOTE — DISCHARGE SUMMARY
[facility] Medicine  Discharge Summary      Patient Name: Katie Quevedo  MRN: [id number]  Admission Date: 1/14/2020  Hospital Length of Stay: 0 days  Discharge Date and Time:  01/16/2020 5:55 PM  Attending Physician: Jennifer Bowles*   Discharging Provider: Jere Mercado NP  Primary Care Provider: Kingston Verduzco MD      HPI:   74 y.o. female with past medical history of anemia, asthma, bilateral renal cysts, chronic pain, CKD, COPD on home O2, dehydration, HTN, lumbar spondylosis, migraines, osteoporosis, osteoarthritis, pulmonary embolism, and seizures who presents to the emergency department today with complaint of abnormal lab result. Patient was referred to the ED for possible admission after she had labs drawn in clinic with Dr. Gambino today. Blood work showed abnormal renal function. Recent baseline creatinine 2.2-2.5, but today it has increased to 3.8.  She has had some nausea and vomiting for the last 2-3 days, unable to take in anything oral.  She reports nausea and vomiting has improved but is still present.  Patient reports associated weakness.  Denies any chest pain, shortness of breath, abdominal pain, fever, chills, no change in bowel habits. She has had decreased urine production. Patient being admitted to hospital medicine under observation status for further medical management.    * No surgery found *      Hospital Course:   The patient was admitted to the [facility] service for further care. Nephrology was consulted. IV fluids were given. General surgery was consulted. Vein mapping completed while admitted---will plan AV fistula on Tuesday. Her creatinine has improved with fluids but her potassium is at 5.7. Will give Kayexelate PO once---nephrology has ordered calcium gluconate. Potassium is now 5.3. Will d/c home. She will need to follow a low potassium diet.     Consults:   Consults (From admission, onward)        Status Ordering  Provider     Inpatient consult to General Surgery  Once     Provider:  Alexander Devi MD    Acknowledged AURA WINTER     Inpatient consult to [facility] (Caterina Bustillos Nimkevych)  Once     Provider:  Aura Winter MD    Completed REMY CARRASCO          * Acute kidney injury superimposed on CKD  YOSVANY (acute kidney injury)  CKD (chronic kidney disease) stage 4, GFR 15-29 ml/min    Baseline creatinine 2.2-2.5> currently 3.8>3.3>2.8  Patient reports decreased urine  Cont IVFs  Avoid nephrotoxic agents >hold Lasix, Cymbalta and Buspar for as few days on d/c. Hold until 01/19/2020  Strict I/O's  Renally dose medications   We appreciate Nephrology  Monitor    YOSVANY (acute kidney injury)        Non-intractable vomiting with nausea  Zofran prn  IVFs  Monitor      Chronic diastolic heart failure  Essential hypertension    Does not appear fluid overloaded on exam  Denies chest pain or SOB  Hold lasix secondary to abnormal kidney function  Continuous cardiac monitoring  Continue amlodipine  Daily weight  monitor            Chronic respiratory failure with hypoxia, on home O2 therapy  COPD (chronic obstructive pulmonary disease)    Continue home O2  Duonebs prn  Hold Trelegy for now 2/2 not on hospital formulary      Hyperkalemia  K 5.7>5.5>5.3. kayexalate was ordered. Calcium gluconate was also ordered by nephrology. This patient will need to follow a low potassium diet.    Iron deficiency anemia  Monitor      Chronic low back pain  Continue home Baclofen and Norco        Final Active Diagnoses:    Diagnosis Date Noted POA    PRINCIPAL PROBLEM:  Acute kidney injury superimposed on CKD [N17.9, N18.9]  Yes    YOSVANY (acute kidney injury) [N17.9] 01/14/2020 Yes    Non-intractable vomiting with nausea [R11.2] 01/14/2020 Yes    CKD (chronic kidney disease) stage 4, GFR 15-29 ml/min [N18.4] 12/18/2019 Yes    COPD (chronic obstructive pulmonary disease) [J44.9] 12/18/2019 Yes    Chronic  diastolic heart failure [I50.32] 10/30/2019 Yes    Chronic respiratory failure with hypoxia, on home O2 therapy [J96.11, Z99.81] 07/18/2019 Not Applicable    Hyperkalemia [E87.5] 03/05/2018 No    Essential hypertension [I10] 01/22/2018 Yes     Chronic    Iron deficiency anemia [D50.9] 01/22/2018 Yes    Chronic low back pain [M54.5, G89.29] 09/25/2015 Yes      Problems Resolved During this Admission:       Discharged Condition: stable    Disposition: Home or Self Care    Follow Up:  Follow-up Information     Kingston Verduzco MD.    Specialty:  Family Medicine  Why:  The office will call you to set up appointment.  Contact information:  [address]  [place] [address] [phone]                 Patient Instructions:      Ambulatory Referral to External Surgery   Referral Priority: Routine Referral Type: Surgical   Referred to Provider: BAUTISTA GARCIA Requested Specialty: Surgery   Number of Visits Requested: 1     Diet renal   Order Comments: Low in potassium     Notify your health care provider if you experience any of the following:  temperature >100.4     Notify your health care provider if you experience any of the following:  persistent nausea and vomiting or diarrhea     Notify your health care provider if you experience any of the following:  severe uncontrolled pain     Notify your health care provider if you experience any of the following:  difficulty breathing or increased cough     Notify your health care provider if you experience any of the following:  severe persistent headache     Notify your health care provider if you experience any of the following:  worsening rash     Notify your health care provider if you experience any of the following:  persistent dizziness, light-headedness, or visual disturbances     Notify your health care provider if you experience any of the following:  increased confusion or weakness     Activity as tolerated       Significant Diagnostic  Studies: Labs:   BMP:   Recent Labs   Lab 01/14/20  2000 01/15/20  0623 01/16/20  0619 01/16/20  1204 01/16/20  1622   GLU 97 74 102  --   --    * 133* 135*  --   --    K 4.7 4.6 5.7* 5.5* 5.3*   CL 99 99 102  --   --    CO2 22* 22* 24  --   --    BUN 45* 41* 36*  --   --    CREATININE 3.8* 3.3* 2.8*  --   --    CALCIUM 9.7 9.5 9.7  --   --    MG  --  2.3 2.4  --   --     and CBC   Recent Labs   Lab 01/14/20  2000 01/15/20  0623 01/16/20  0619   WBC 8.34 7.40 7.04   HGB 10.0* 9.3* 9.3*   HCT 32.0* 29.8* 29.8*    289 275       Pending Diagnostic Studies:     None         Medications:  Reconciled Home Medications:      Medication List      CONTINUE taking these medications    albuterol 2.5 mg /3 mL (0.083 %) nebulizer solution  Commonly known as:  PROVENTIL  Take 3 mLs (2.5 mg total) by nebulization every 6 (six) hours as needed for Wheezing. Rescue     albuterol-ipratropium 2.5 mg-0.5 mg/3 mL nebulizer solution  Commonly known as:  DUO-NEB  Take 3 mLs by nebulization every 6 (six) hours as needed for Shortness of Breath. Rescue     allopurinol 100 MG tablet  Commonly known as:  ZYLOPRIM  Take 1 tablet (100 mg total) by mouth once daily. Take 100 mg -on Monday and Friday only     amLODIPine 10 MG tablet  Commonly known as:  NORVASC  Take 10 mg by mouth once daily.     baclofen 10 MG tablet  Commonly known as:  LIORESAL  TAKE 1 TABLET (10 MG TOTAL) BY MOUTH 3 (THREE) TIMES DAILY AS NEEDED (MUSCLE SPASMS).     busPIRone 5 MG Tab  Commonly known as:  BUSPAR  Take 5 mg by mouth 3 (three) times daily.     Calcium 500 + D 500 mg(1,250mg) -200 unit per tablet  Generic drug:  calcium-vitamin D3  Take 1 tablet by mouth 2 (two) times daily with meals.     diazePAM 2 MG tablet  Commonly known as:  VALIUM  Take 2 mg by mouth once daily.     DULoxetine 30 MG capsule  Commonly known as:  CYMBALTA  TAKE 1 CAPSULE BY MOUTH EVERY DAY     furosemide 20 MG tablet  Commonly known as:  LASIX  Take 1 tablet (20 mg total) by  mouth 2 (two) times daily.     * HYDROcodone-acetaminophen  mg per tablet  Commonly known as:  NORCO  Take 1 tablet by mouth 3 (three) times daily as needed.     * HYDROcodone-acetaminophen  mg per tablet  Commonly known as:  NORCO  Take 1 tablet by mouth 3 (three) times daily as needed.     ondansetron 4 MG Tbdl  Commonly known as:  ZOFRAN-ODT  Dissolve one tablet under the tongue every 6 (six) hours as needed.     sodium bicarbonate 650 MG tablet  Take 1 tablet (650 mg total) by mouth 2 (two) times daily.     Trelegy Ellipta 100-62.5-25 mcg Dsdv  Generic drug:  fluticasone-umeclidin-vilanter  Inhale 1 puff by mouth into the lungs once daily.     zolpidem 5 MG Tab  Commonly known as:  AMBIEN  Take 5 mg by mouth every evening.         * This list has 2 medication(s) that are the same as other medications prescribed for you. Read the directions carefully, and ask your doctor or other care provider to review them with you.                Indwelling Lines/Drains at time of discharge:   Lines/Drains/Airways     None                 Time spent on the discharge of patient: 35 minutes  Patient was seen and examined on the date of discharge and determined to be suitable for discharge.         Jere Mercado NP  Department of Hospital Medicine  [facility]

## 2020-01-16 NOTE — PLAN OF CARE
Pt's spouse will drive her home and bring her portable home oxygen to her bedside for transport home.  Rounds completed on pt.  All questions addressed.  Bedside nurse to discuss d/c medications.  Discussed importance to attend all f/u appts and take medications as prescribed.  Verbalized understanding.    Future Appointments   Date Time Provider Department Center   1/20/2020  3:30 PM [facility] US2 [facility] USOUNDO [facility]   1/28/2020 11:45 AM Aura Diggs MD [facility]   1/29/2020  1:50 PM APPOINTMENT LAB, [facility] MOB [facility] LAB [facility]   1/29/2020  2:40 PM Jason Anderson MD [facility] CARDIO [facility]   2/4/2020  3:00 PM Clemencia Gambino MD [facility] IMPRI [facility]   3/16/2020  2:40 PM Pushpa Mcmahon MD [facility]        01/16/20 1528   Final Note   Assessment Type Final Discharge Note   Anticipated Discharge Disposition Home   Hospital Follow Up  Appt(s) scheduled? Yes   Discharge plans and expectations educations in teach back method with documentation complete? Yes   Right Care Referral Info   Post Acute Recommendation No Care     Darrian Guerrero RN,   [phone]

## 2020-01-16 NOTE — ASSESSMENT & PLAN NOTE
K 5.7>5.5>5.3. kayexalate was ordered. Calcium gluconate was also ordered by nephrology. This patient will need to follow a low potassium diet.

## 2020-01-16 NOTE — PROGRESS NOTES
Progress Note  Nephrology      Consult Requested By: Jennifer Bowles*      SUBJECTIVE:     Overnight events  Patient is a 74 y.o. female     Patient Active Problem List   Diagnosis    Melanoma    Chronic pain    Vitamin deficiency    Cervical post-laminectomy syndrome    Lumbar postlaminectomy syndrome    Chronic neck pain    Lumbosacral spondylosis without myelopathy    Isolated cervical dystonia    Primary osteoarthritis of both knees    Subdeltoid bursitis, L>R.    Other fragments of torsion dystonia    Nuclear sclerosis - Both Eyes    Rotator cuff tear, right    Biceps tendonitis    Osteoarthritis, hip, bilateral    Lumbar radiculopathy, BLE    Cervical radiculopathy, BUE    Osteoporosis    Chronic low back pain    Iron deficiency anemia    Essential hypertension    Atrophy of left kidney    Kidney cysts    History of colon polyps    Hyperkalemia    Pleural effusion    Pericardial effusion    Chronic respiratory failure with hypoxia, on home O2 therapy    Acute kidney injury superimposed on CKD    Anemia, chronic renal failure, stage 4 (severe)    Lipoma of torso    Chronic diastolic heart failure    CKD (chronic kidney disease) stage 4, GFR 15-29 ml/min    COPD (chronic obstructive pulmonary disease)    Non-intractable vomiting with nausea    YOSVANY (acute kidney injury)     Past Medical History:   Diagnosis Date    Anemia     Asthma     Bilateral renal cysts     Cataract     Chronic LBP 7/26/2012    Chronic pain     CKD (chronic kidney disease), stage IV     COPD (chronic obstructive pulmonary disease)     Dehydration     Encounter for blood transfusion     HTN (hypertension)     Lumbar spondylosis     Melanoma     of the lip    Metabolic bone disease     Migraines, neuralgic     Osteoporosis     Primary osteoarthritis of both knees     s/p Rt TKA    Pulmonary embolism with infarction     Seizures     Subdeltoid bursitis, L>R. 9/27/2012    Ulcer      Vitamin D deficiency disease               OBJECTIVE:     Vitals:    01/16/20 0809 01/16/20 1126 01/16/20 1147 01/16/20 1200   BP:  130/77     BP Location:  Left arm     Patient Position:  Sitting     Pulse: 99 93 89 94   Resp:  18  (!) 22   Temp:  98.4 °F (36.9 °C)     TempSrc:  Oral     SpO2:  96%  (!) 94%   Weight:       Height:           Temp: 98.4 °F (36.9 °C) (01/16/20 1126)  Pulse: 94 (01/16/20 1200)  Resp: (!) 22 (01/16/20 1200)  BP: 130/77 (01/16/20 1126)  SpO2: (!) 94 % (01/16/20 1200)    Date 01/16/20 0700 - 01/17/20 0659   Shift 2386-7663 5155-1286 4565-8451 24 Hour Total   INTAKE   P.O. 360   360   Shift Total(mL/kg) 360(5.3)   360(5.3)   OUTPUT   Urine(mL/kg/hr) 700   700   Shift Total(mL/kg) 700(10.2)   700(10.2)   Weight (kg) 68.5 68.5 68.5 68.5             Medications:   allopurinol  100 mg Oral Daily    amLODIPine  2.5 mg Oral Daily    calcium carbonate  500 mg Oral BID    enoxaparin  30 mg Subcutaneous Daily    epoetin hemant-ebpx (RETACRIT) injection  20,000 Units Subcutaneous Once    lidocaine (PF) 10 mg/ml (1%)  1 mL Intradermal Once    lidocaine  1 patch Transdermal Q24H                 Physical Exam:  General appearance:NAD  Weak  Lungs: diminished breath sounds  Heart: pulse 94  Abdomen: soft  Extremities: no edema  Skin: dry    Laboratory:  ABG  Labs reviewed  Recent Results (from the past 336 hour(s))   Basic Metabolic Panel (BMP)    Collection Time: 01/16/20  6:19 AM   Result Value Ref Range    Sodium 135 (L) 136 - 145 mmol/L    Potassium 5.7 (H) 3.5 - 5.1 mmol/L    Chloride 102 95 - 110 mmol/L    CO2 24 23 - 29 mmol/L    BUN, Bld 36 (H) 8 - 23 mg/dL    Creatinine 2.8 (H) 0.5 - 1.4 mg/dL    Calcium 9.7 8.7 - 10.5 mg/dL    Anion Gap 9 8 - 16 mmol/L   Basic Metabolic Panel (BMP)    Collection Time: 01/15/20  6:23 AM   Result Value Ref Range    Sodium 133 (L) 136 - 145 mmol/L    Potassium 4.6 3.5 - 5.1 mmol/L    Chloride 99 95 - 110 mmol/L    CO2 22 (L) 23 - 29 mmol/L    BUN, Bld 41  (H) 8 - 23 mg/dL    Creatinine 3.3 (H) 0.5 - 1.4 mg/dL    Calcium 9.5 8.7 - 10.5 mg/dL    Anion Gap 12 8 - 16 mmol/L     Recent Results (from the past 336 hour(s))   CBC with Automated Differential    Collection Time: 01/16/20  6:19 AM   Result Value Ref Range    WBC 7.04 3.90 - 12.70 K/uL    Hemoglobin 9.3 (L) 12.0 - 16.0 g/dL    Hematocrit 29.8 (L) 37.0 - 48.5 %    Platelets 275 150 - 350 K/uL   CBC with Automated Differential    Collection Time: 01/15/20  6:23 AM   Result Value Ref Range    WBC 7.40 3.90 - 12.70 K/uL    Hemoglobin 9.3 (L) 12.0 - 16.0 g/dL    Hematocrit 29.8 (L) 37.0 - 48.5 %    Platelets 289 150 - 350 K/uL   CBC auto differential    Collection Time: 01/14/20  8:00 PM   Result Value Ref Range    WBC 8.34 3.90 - 12.70 K/uL    Hemoglobin 10.0 (L) 12.0 - 16.0 g/dL    Hematocrit 32.0 (L) 37.0 - 48.5 %    Platelets 291 150 - 350 K/uL     Urinalysis  No results for input(s): COLORU, CLARITYU, SPECGRAV, PHUR, PROTEINUA, GLUCOSEU, BILIRUBINCON, BLOODU, WBCU, RBCU, BACTERIA, MUCUS, NITRITE, LEUKOCYTESUR, UROBILINOGEN, HYALINECASTS in the last 24 hours.    Diagnostic Results:  X-Ray: Reviewed  US: Reviewed  Echo: Reviewed  ACCESS    ASSESSMENT/PLAN:     CKD 5  K 5.5  Metabolic bone disease  Anemia  Epogen  /77  Weight 68.5  Discussed low potassium, renal diet  CKD training

## 2020-01-20 ENCOUNTER — ANESTHESIA EVENT (OUTPATIENT)
Dept: SURGERY | Facility: HOSPITAL | Age: 75
End: 2020-01-20
Payer: MEDICARE

## 2020-01-20 ENCOUNTER — HOSPITAL ENCOUNTER (OUTPATIENT)
Dept: RADIOLOGY | Facility: HOSPITAL | Age: 75
Discharge: HOME | End: 2020-01-20
Attending: INTERNAL MEDICINE
Payer: MEDICARE

## 2020-01-20 DIAGNOSIS — R10.11 RIGHT UPPER QUADRANT ABDOMINAL PAIN: ICD-10-CM

## 2020-01-20 PROCEDURE — 76705 ECHO EXAM OF ABDOMEN: CPT | Mod: TC,HCNC

## 2020-01-20 PROCEDURE — 76705 ECHO EXAM OF ABDOMEN: CPT | Mod: 26,HCNC,, | Performed by: RADIOLOGY

## 2020-01-20 PROCEDURE — 76705 US ABDOMEN LIMITED: ICD-10-PCS | Mod: 26,HCNC,, | Performed by: RADIOLOGY

## 2020-01-21 ENCOUNTER — HOSPITAL ENCOUNTER (OUTPATIENT)
Facility: HOSPITAL | Age: 75
Discharge: HOME | End: 2020-01-21
Attending: SURGERY | Admitting: SURGERY
Payer: MEDICARE

## 2020-01-21 ENCOUNTER — ANESTHESIA (OUTPATIENT)
Dept: SURGERY | Facility: HOSPITAL | Age: 75
End: 2020-01-21
Payer: MEDICARE

## 2020-01-21 VITALS
HEIGHT: 62 IN | BODY MASS INDEX: 27.79 KG/M2 | HEART RATE: 80 BPM | DIASTOLIC BLOOD PRESSURE: 71 MMHG | TEMPERATURE: 98 F | RESPIRATION RATE: 19 BRPM | SYSTOLIC BLOOD PRESSURE: 125 MMHG | OXYGEN SATURATION: 98 % | WEIGHT: 151 LBS

## 2020-01-21 DIAGNOSIS — I31.39 PERICARDIAL EFFUSION: ICD-10-CM

## 2020-01-21 DIAGNOSIS — D63.1 ANEMIA, CHRONIC RENAL FAILURE, STAGE 4 (SEVERE): ICD-10-CM

## 2020-01-21 DIAGNOSIS — N18.4 ANEMIA, CHRONIC RENAL FAILURE, STAGE 4 (SEVERE): ICD-10-CM

## 2020-01-21 DIAGNOSIS — N18.9 ACUTE RENAL FAILURE SUPERIMPOSED ON CHRONIC KIDNEY DISEASE, UNSPECIFIED CKD STAGE, UNSPECIFIED ACUTE RENAL FAILURE TYPE: ICD-10-CM

## 2020-01-21 DIAGNOSIS — R53.83 FATIGUE: ICD-10-CM

## 2020-01-21 DIAGNOSIS — N28.1 KIDNEY CYSTS: ICD-10-CM

## 2020-01-21 DIAGNOSIS — I10 ESSENTIAL HYPERTENSION: Primary | Chronic | ICD-10-CM

## 2020-01-21 DIAGNOSIS — N17.9 ACUTE RENAL FAILURE SUPERIMPOSED ON CHRONIC KIDNEY DISEASE, UNSPECIFIED CKD STAGE, UNSPECIFIED ACUTE RENAL FAILURE TYPE: ICD-10-CM

## 2020-01-21 DIAGNOSIS — J90 PLEURAL EFFUSION: ICD-10-CM

## 2020-01-21 DIAGNOSIS — J44.9 CHRONIC OBSTRUCTIVE PULMONARY DISEASE, UNSPECIFIED COPD TYPE: ICD-10-CM

## 2020-01-21 DIAGNOSIS — G89.29 CHRONIC MIDLINE LOW BACK PAIN WITH RIGHT-SIDED SCIATICA: ICD-10-CM

## 2020-01-21 DIAGNOSIS — M54.41 CHRONIC MIDLINE LOW BACK PAIN WITH RIGHT-SIDED SCIATICA: ICD-10-CM

## 2020-01-21 DIAGNOSIS — C43.0 MALIGNANT MELANOMA OF LIP: ICD-10-CM

## 2020-01-21 DIAGNOSIS — N18.4 CKD (CHRONIC KIDNEY DISEASE) STAGE 4, GFR 15-29 ML/MIN: ICD-10-CM

## 2020-01-21 LAB
ANION GAP SERPL CALC-SCNC: 9 MMOL/L (ref 8–16)
BUN SERPL-MCNC: 18 MG/DL (ref 8–23)
CALCIUM SERPL-MCNC: 9.5 MG/DL (ref 8.7–10.5)
CHLORIDE SERPL-SCNC: 104 MMOL/L (ref 95–110)
CO2 SERPL-SCNC: 23 MMOL/L (ref 23–29)
CREAT SERPL-MCNC: 1.8 MG/DL (ref 0.5–1.4)
EST. GFR  (AFRICAN AMERICAN): 31 ML/MIN/1.73 M^2
EST. GFR  (NON AFRICAN AMERICAN): 27 ML/MIN/1.73 M^2
GLUCOSE SERPL-MCNC: 90 MG/DL (ref 70–110)
POTASSIUM SERPL-SCNC: 4.9 MMOL/L (ref 3.5–5.1)
SODIUM SERPL-SCNC: 136 MMOL/L (ref 136–145)

## 2020-01-21 PROCEDURE — 63600175 PHARM REV CODE 636 W HCPCS: Mod: HCNC | Performed by: STUDENT IN AN ORGANIZED HEALTH CARE EDUCATION/TRAINING PROGRAM

## 2020-01-21 PROCEDURE — 25000003 PHARM REV CODE 250: Mod: HCNC | Performed by: SURGERY

## 2020-01-21 PROCEDURE — S0077 INJECTION, CLINDAMYCIN PHOSP: HCPCS | Mod: HCNC | Performed by: NURSE ANESTHETIST, CERTIFIED REGISTERED

## 2020-01-21 PROCEDURE — 27201423 OPTIME MED/SURG SUP & DEVICES STERILE SUPPLY: Mod: HCNC | Performed by: SURGERY

## 2020-01-21 PROCEDURE — 37000009 HC ANESTHESIA EA ADD 15 MINS: Mod: HCNC | Performed by: SURGERY

## 2020-01-21 PROCEDURE — 94640 AIRWAY INHALATION TREATMENT: CPT | Mod: HCNC

## 2020-01-21 PROCEDURE — 63600175 PHARM REV CODE 636 W HCPCS: Mod: HCNC | Performed by: SURGERY

## 2020-01-21 PROCEDURE — 01844 ANES VASC SHUNT/SHUNT REVJ: CPT | Mod: HCNC | Performed by: SURGERY

## 2020-01-21 PROCEDURE — C1768 GRAFT, VASCULAR: HCPCS | Mod: HCNC | Performed by: SURGERY

## 2020-01-21 PROCEDURE — 36415 COLL VENOUS BLD VENIPUNCTURE: CPT | Mod: HCNC

## 2020-01-21 PROCEDURE — 76942 ECHO GUIDE FOR BIOPSY: CPT | Mod: HCNC | Performed by: STUDENT IN AN ORGANIZED HEALTH CARE EDUCATION/TRAINING PROGRAM

## 2020-01-21 PROCEDURE — 63600175 PHARM REV CODE 636 W HCPCS: Mod: HCNC | Performed by: NURSE ANESTHETIST, CERTIFIED REGISTERED

## 2020-01-21 PROCEDURE — 25000242 PHARM REV CODE 250 ALT 637 W/ HCPCS: Mod: HCNC | Performed by: STUDENT IN AN ORGANIZED HEALTH CARE EDUCATION/TRAINING PROGRAM

## 2020-01-21 PROCEDURE — 36000707: Mod: HCNC | Performed by: SURGERY

## 2020-01-21 PROCEDURE — 71000016 HC POSTOP RECOV ADDL HR: Mod: HCNC | Performed by: SURGERY

## 2020-01-21 PROCEDURE — 36000706: Mod: HCNC | Performed by: SURGERY

## 2020-01-21 PROCEDURE — 64415 NJX AA&/STRD BRCH PLXS IMG: CPT | Mod: HCNC,LT | Performed by: STUDENT IN AN ORGANIZED HEALTH CARE EDUCATION/TRAINING PROGRAM

## 2020-01-21 PROCEDURE — 71000015 HC POSTOP RECOV 1ST HR: Mod: HCNC | Performed by: SURGERY

## 2020-01-21 PROCEDURE — 25000003 PHARM REV CODE 250: Mod: HCNC | Performed by: NURSE ANESTHETIST, CERTIFIED REGISTERED

## 2020-01-21 PROCEDURE — 80048 BASIC METABOLIC PNL TOTAL CA: CPT | Mod: HCNC

## 2020-01-21 PROCEDURE — 37000008 HC ANESTHESIA 1ST 15 MINUTES: Mod: HCNC | Performed by: SURGERY

## 2020-01-21 DEVICE — GRAFT STRETCH RING 6MMX5CMX45: Type: IMPLANTABLE DEVICE | Site: ARM | Status: FUNCTIONAL

## 2020-01-21 RX ORDER — HYDROCODONE BITARTRATE AND ACETAMINOPHEN 5; 325 MG/1; MG/1
1 TABLET ORAL EVERY 4 HOURS PRN
Status: DISCONTINUED | OUTPATIENT
Start: 2020-01-21 | End: 2020-01-21 | Stop reason: HOSPADM

## 2020-01-21 RX ORDER — CLINDAMYCIN PHOSPHATE 900 MG/50ML
INJECTION, SOLUTION INTRAVENOUS
Status: DISCONTINUED | OUTPATIENT
Start: 2020-01-21 | End: 2020-01-21

## 2020-01-21 RX ORDER — IPRATROPIUM BROMIDE AND ALBUTEROL SULFATE 2.5; .5 MG/3ML; MG/3ML
3 SOLUTION RESPIRATORY (INHALATION) ONCE
Status: COMPLETED | OUTPATIENT
Start: 2020-01-21 | End: 2020-01-21

## 2020-01-21 RX ORDER — BACITRACIN 50000 [IU]/1
INJECTION, POWDER, FOR SOLUTION INTRAMUSCULAR
Status: DISCONTINUED | OUTPATIENT
Start: 2020-01-21 | End: 2020-01-21 | Stop reason: HOSPADM

## 2020-01-21 RX ORDER — SODIUM CHLORIDE 9 MG/ML
INJECTION, SOLUTION INTRAVENOUS CONTINUOUS
Status: DISCONTINUED | OUTPATIENT
Start: 2020-01-21 | End: 2020-01-21 | Stop reason: HOSPADM

## 2020-01-21 RX ORDER — HYDROCODONE BITARTRATE AND ACETAMINOPHEN 5; 325 MG/1; MG/1
1 TABLET ORAL EVERY 6 HOURS PRN
Qty: 20 TABLET | Refills: 0 | Status: ON HOLD | OUTPATIENT
Start: 2020-01-21 | End: 2020-02-03 | Stop reason: SDUPTHER

## 2020-01-21 RX ORDER — LIDOCAINE HYDROCHLORIDE 10 MG/ML
INJECTION, SOLUTION EPIDURAL; INFILTRATION; INTRACAUDAL; PERINEURAL
Status: DISCONTINUED | OUTPATIENT
Start: 2020-01-21 | End: 2020-01-21 | Stop reason: HOSPADM

## 2020-01-21 RX ORDER — PHENYLEPHRINE HYDROCHLORIDE 10 MG/ML
INJECTION INTRAVENOUS
Status: DISCONTINUED | OUTPATIENT
Start: 2020-01-21 | End: 2020-01-21

## 2020-01-21 RX ORDER — LIDOCAINE HCL/PF 100 MG/5ML
SYRINGE (ML) INTRAVENOUS
Status: DISCONTINUED | OUTPATIENT
Start: 2020-01-21 | End: 2020-01-21

## 2020-01-21 RX ORDER — PROPOFOL 10 MG/ML
VIAL (ML) INTRAVENOUS
Status: DISCONTINUED | OUTPATIENT
Start: 2020-01-21 | End: 2020-01-21

## 2020-01-21 RX ORDER — ROPIVACAINE HYDROCHLORIDE 5 MG/ML
INJECTION, SOLUTION EPIDURAL; INFILTRATION; PERINEURAL
Status: DISCONTINUED | OUTPATIENT
Start: 2020-01-21 | End: 2020-01-21

## 2020-01-21 RX ORDER — ACETAMINOPHEN 325 MG/1
650 TABLET ORAL EVERY 4 HOURS PRN
Status: DISCONTINUED | OUTPATIENT
Start: 2020-01-21 | End: 2020-01-21 | Stop reason: HOSPADM

## 2020-01-21 RX ORDER — VASOPRESSIN 20 [USP'U]/ML
INJECTION, SOLUTION INTRAMUSCULAR; SUBCUTANEOUS
Status: DISCONTINUED | OUTPATIENT
Start: 2020-01-21 | End: 2020-01-21

## 2020-01-21 RX ORDER — MIDAZOLAM HYDROCHLORIDE 1 MG/ML
INJECTION, SOLUTION INTRAMUSCULAR; INTRAVENOUS
Status: DISCONTINUED | OUTPATIENT
Start: 2020-01-21 | End: 2020-01-21

## 2020-01-21 RX ORDER — PROPOFOL 10 MG/ML
VIAL (ML) INTRAVENOUS CONTINUOUS PRN
Status: DISCONTINUED | OUTPATIENT
Start: 2020-01-21 | End: 2020-01-21

## 2020-01-21 RX ORDER — MUPIROCIN 20 MG/G
OINTMENT TOPICAL
Status: DISCONTINUED | OUTPATIENT
Start: 2020-01-21 | End: 2020-01-21 | Stop reason: HOSPADM

## 2020-01-21 RX ORDER — GLYCOPYRROLATE 0.2 MG/ML
INJECTION INTRAMUSCULAR; INTRAVENOUS
Status: DISCONTINUED | OUTPATIENT
Start: 2020-01-21 | End: 2020-01-21

## 2020-01-21 RX ORDER — HEPARIN SODIUM 5000 [USP'U]/ML
INJECTION, SOLUTION INTRAVENOUS; SUBCUTANEOUS
Status: DISCONTINUED | OUTPATIENT
Start: 2020-01-21 | End: 2020-01-21 | Stop reason: HOSPADM

## 2020-01-21 RX ADMIN — PHENYLEPHRINE HYDROCHLORIDE 200 MCG: 10 INJECTION INTRAVENOUS at 12:01

## 2020-01-21 RX ADMIN — SODIUM CHLORIDE: 0.9 INJECTION, SOLUTION INTRAVENOUS at 12:01

## 2020-01-21 RX ADMIN — PHENYLEPHRINE HYDROCHLORIDE 100 MCG: 10 INJECTION INTRAVENOUS at 01:01

## 2020-01-21 RX ADMIN — LIDOCAINE HYDROCHLORIDE 60 MG: 20 INJECTION, SOLUTION INTRAVENOUS at 12:01

## 2020-01-21 RX ADMIN — PROPOFOL 40 MG: 10 INJECTION, EMULSION INTRAVENOUS at 12:01

## 2020-01-21 RX ADMIN — ROPIVACAINE HYDROCHLORIDE 30 ML: 5 INJECTION, SOLUTION EPIDURAL; INFILTRATION; PERINEURAL at 11:01

## 2020-01-21 RX ADMIN — IPRATROPIUM BROMIDE AND ALBUTEROL SULFATE 3 ML: .5; 3 SOLUTION RESPIRATORY (INHALATION) at 02:01

## 2020-01-21 RX ADMIN — MIDAZOLAM 2 MG: 1 INJECTION INTRAMUSCULAR; INTRAVENOUS at 11:01

## 2020-01-21 RX ADMIN — VASOPRESSIN 2 UNITS: 20 INJECTION INTRAVENOUS at 12:01

## 2020-01-21 RX ADMIN — VASOPRESSIN 1 UNITS: 20 INJECTION INTRAVENOUS at 12:01

## 2020-01-21 RX ADMIN — PROPOFOL 100 MCG/KG/MIN: 10 INJECTION, EMULSION INTRAVENOUS at 12:01

## 2020-01-21 RX ADMIN — GLYCOPYRROLATE 0.2 MG: 0.2 INJECTION, SOLUTION INTRAMUSCULAR; INTRAVENOUS at 12:01

## 2020-01-21 RX ADMIN — PHENYLEPHRINE HYDROCHLORIDE 100 MCG: 10 INJECTION INTRAVENOUS at 12:01

## 2020-01-21 RX ADMIN — CLINDAMYCIN IN 5 PERCENT DEXTROSE 900 MG: 18 INJECTION, SOLUTION INTRAVENOUS at 12:01

## 2020-01-21 RX ADMIN — VASOPRESSIN 1 UNITS: 20 INJECTION INTRAVENOUS at 01:01

## 2020-01-21 NOTE — H&P
Today`s Date: 1/21/2020     Admit Date: (Not on file)    Admitting Physician: Lindsey Louie MD    Patient`s Name: Katie Quevedo , 74 y.o. female    HISTORY AND CHIEF COMPLAINT  For placement of Gortex graft   Patient Active Problem List   Diagnosis    Melanoma    Chronic pain    Vitamin deficiency    Cervical post-laminectomy syndrome    Lumbar postlaminectomy syndrome    Chronic neck pain    Lumbosacral spondylosis without myelopathy    Isolated cervical dystonia    Primary osteoarthritis of both knees    Subdeltoid bursitis, L>R.    Other fragments of torsion dystonia    Nuclear sclerosis - Both Eyes    Rotator cuff tear, right    Biceps tendonitis    Osteoarthritis, hip, bilateral    Lumbar radiculopathy, BLE    Cervical radiculopathy, BUE    Osteoporosis    Chronic low back pain    Iron deficiency anemia    Essential hypertension    Atrophy of left kidney    Kidney cysts    History of colon polyps    Hyperkalemia    Pleural effusion    Pericardial effusion    Chronic respiratory failure with hypoxia, on home O2 therapy    Acute kidney injury superimposed on CKD    Anemia, chronic renal failure, stage 4 (severe)    Lipoma of torso    Chronic diastolic heart failure    CKD (chronic kidney disease) stage 4, GFR 15-29 ml/min    COPD (chronic obstructive pulmonary disease)    Non-intractable vomiting with nausea    YOSVANY (acute kidney injury)       Past Medical History:   Diagnosis Date    Anemia     Asthma     Bilateral renal cysts     Cataract     Chronic LBP 7/26/2012    Chronic pain     CKD (chronic kidney disease), stage IV     COPD (chronic obstructive pulmonary disease)     Dehydration     Encounter for blood transfusion     HTN (hypertension)     Lumbar spondylosis     Melanoma     of the lip    Metabolic bone disease     Migraines, neuralgic     Osteoporosis     Primary osteoarthritis of both knees     s/p Rt TKA    Pulmonary embolism with infarction      Seizures     Subdeltoid bursitis, L>R. 9/27/2012    Ulcer     Vitamin D deficiency disease        Past Surgical History:   Procedure Laterality Date    BLADDER SUSPENSION      CATARACT EXTRACTION  11/18/13    left eye    CERVICAL LAMINECTOMY      x3, fusion x1    COLONOSCOPY  2009    HYSTERECTOMY      JOINT REPLACEMENT  2001    total right knee     LUMBAR LAMINECTOMY      x 3, fusion x1    OOPHORECTOMY         Prior to Admission medications    Medication Sig Start Date End Date Taking? Authorizing Provider   albuterol (PROVENTIL) 2.5 mg /3 mL (0.083 %) nebulizer solution Take 3 mLs (2.5 mg total) by nebulization every 6 (six) hours as needed for Wheezing. Rescue 11/8/19 11/7/20  Kingston Verduzco MD   albuterol-ipratropium (DUO-NEB) 2.5 mg-0.5 mg/3 mL nebulizer solution Take 3 mLs by nebulization every 6 (six) hours as needed for Shortness of Breath. Rescue 12/18/19 12/17/20  Giancarlo Rust MD   allopurinol (ZYLOPRIM) 100 MG tablet Take 1 tablet (100 mg total) by mouth once daily. Take 100 mg -on Monday and Friday only 10/28/19   Aura Diggs MD   amLODIPine (NORVASC) 10 MG tablet Take 10 mg by mouth once daily.    Historical Provider, MD   baclofen (LIORESAL) 10 MG tablet TAKE 1 TABLET (10 MG TOTAL) BY MOUTH 3 (THREE) TIMES DAILY AS NEEDED (MUSCLE SPASMS). 11/26/19 1/14/20  Pushpa Mcmahon MD   busPIRone (BUSPAR) 5 MG Tab Take 5 mg by mouth 3 (three) times daily.  7/23/19   Historical Provider, MD   calcium-vitamin D3 (CALCIUM 500 + D) 500 mg(1,250mg) -200 unit per tablet Take 1 tablet by mouth 2 (two) times daily with meals.    Historical Provider, MD   diazePAM (VALIUM) 2 MG tablet Take 2 mg by mouth once daily.  7/2/19   Historical Provider, MD   DULoxetine (CYMBALTA) 30 MG capsule TAKE 1 CAPSULE BY MOUTH EVERY DAY  Patient taking differently: Take 30 mg by mouth once daily.  12/28/19   Pushpa Mcmahon MD   fluticasone-umeclidin-vilanter (TRELEGY ELLIPTA) 100-62.5-25 mcg DsDv  Inhale 1 puff by mouth into the lungs once daily. 1/14/20   Clemencia Gambino MD   furosemide (LASIX) 20 MG tablet Take 1 tablet (20 mg total) by mouth 2 (two) times daily. 1/14/20 1/13/21  Clemencia Gambino MD   HYDROcodone-acetaminophen (NORCO)  mg per tablet Take 1 tablet by mouth 3 (three) times daily as needed. 12/27/19 1/26/20  Greta Prado MD   HYDROcodone-acetaminophen (NORCO)  mg per tablet Take 1 tablet by mouth 3 (three) times daily as needed. 12/28/19 1/27/20  Pushpa Mcmahon MD   ondansetron (ZOFRAN-ODT) 4 MG TbDL Dissolve one tablet under the tongue every 6 (six) hours as needed. 9/16/19   Jere Mercado NP   sodium bicarbonate 650 MG tablet Take 1 tablet (650 mg total) by mouth 2 (two) times daily. 7/29/19 7/28/20  Aura Diggs MD   zolpidem (AMBIEN) 5 MG Tab Take 5 mg by mouth every evening.  6/4/19   Historical Provider, MD     No current facility-administered medications on file prior to encounter.      Current Outpatient Medications on File Prior to Encounter   Medication Sig Dispense Refill    albuterol (PROVENTIL) 2.5 mg /3 mL (0.083 %) nebulizer solution Take 3 mLs (2.5 mg total) by nebulization every 6 (six) hours as needed for Wheezing. Rescue 100 each 3    albuterol-ipratropium (DUO-NEB) 2.5 mg-0.5 mg/3 mL nebulizer solution Take 3 mLs by nebulization every 6 (six) hours as needed for Shortness of Breath. Rescue 3 Box 5    allopurinol (ZYLOPRIM) 100 MG tablet Take 1 tablet (100 mg total) by mouth once daily. Take 100 mg -on Monday and Friday only 30 tablet 1    amLODIPine (NORVASC) 10 MG tablet Take 10 mg by mouth once daily.      baclofen (LIORESAL) 10 MG tablet TAKE 1 TABLET (10 MG TOTAL) BY MOUTH 3 (THREE) TIMES DAILY AS NEEDED (MUSCLE SPASMS). 90 tablet 1    busPIRone (BUSPAR) 5 MG Tab Take 5 mg by mouth 3 (three) times daily.       calcium-vitamin D3 (CALCIUM 500 + D) 500 mg(1,250mg) -200 unit per tablet Take 1 tablet by mouth 2 (two) times  daily with meals.      diazePAM (VALIUM) 2 MG tablet Take 2 mg by mouth once daily.       DULoxetine (CYMBALTA) 30 MG capsule TAKE 1 CAPSULE BY MOUTH EVERY DAY (Patient taking differently: Take 30 mg by mouth once daily. ) 90 capsule 1    fluticasone-umeclidin-vilanter (TRELEGY ELLIPTA) 100-62.5-25 mcg DsDv Inhale 1 puff by mouth into the lungs once daily. 90 each 3    furosemide (LASIX) 20 MG tablet Take 1 tablet (20 mg total) by mouth 2 (two) times daily. 60 tablet 11    HYDROcodone-acetaminophen (NORCO)  mg per tablet Take 1 tablet by mouth 3 (three) times daily as needed. 90 tablet 0    HYDROcodone-acetaminophen (NORCO)  mg per tablet Take 1 tablet by mouth 3 (three) times daily as needed. 90 tablet 0    ondansetron (ZOFRAN-ODT) 4 MG TbDL Dissolve one tablet under the tongue every 6 (six) hours as needed. 30 tablet 3    sodium bicarbonate 650 MG tablet Take 1 tablet (650 mg total) by mouth 2 (two) times daily. 120 tablet 11    zolpidem (AMBIEN) 5 MG Tab Take 5 mg by mouth every evening.   5        Review of patient's allergies indicates:   Allergen Reactions    Aspirin      Other reaction(s): hx of ulcers    Tetracycline Swelling     Other reaction(s): Swelling    Penicillins Rash     Other reaction(s): Hives  Other reaction(s): Rash  Other reaction(s): Rash  Other reaction(s): Hives       Social History:   reports that she has quit smoking. Her smoking use included cigarettes. She has never used smokeless tobacco. She reports that she drinks about 1.0 standard drinks of alcohol per week. She reports that she does not use drugs.     Family History   Problem Relation Age of Onset    Arthritis Mother     Stroke Mother     Hypertension Father     Cancer Father     Cataracts Father     Diabetes Maternal Aunt     Hypertension Maternal Grandfather     Heart disease Maternal Grandfather     Heart attack Maternal Grandfather     Cataracts Sister     Glaucoma Cousin        PHYSICAL  EXAMINATION       General Condition:   Alert x 3     Head & Neck  Anemia: None  Jaundice: None  Neck vein: Not distended  Carotid Bruits: none  Lymph nodes: none palpable  Thyroid: normal    Chest: normal    Heart: normal    Rt. Breast: not examined  Lt. Breast: not examined  Axillary lymph nodes: none    Abdomen: Soft,  None tender with no palpable mass or organ  Hernia: none    Rectal: Defered    Extremities: normal    Vascular: normal    Specific focus Examination    Imp: CRF, HTN, DM,CAD    Plan; insertion graft left upper arm.

## 2020-01-21 NOTE — ANESTHESIA PREPROCEDURE EVALUATION
01/21/2020  Katie Quevedo is a 74 y.o., female presents for AV graft under REG/MAC.    Past Medical History:   Diagnosis Date    Anemia     Asthma     Bilateral renal cysts     Cataract     Chronic LBP 7/26/2012    Chronic pain     CKD (chronic kidney disease), stage IV     COPD (chronic obstructive pulmonary disease)     Dehydration     Encounter for blood transfusion     HTN (hypertension)     Lumbar spondylosis     Melanoma     of the lip    Metabolic bone disease     Migraines, neuralgic     Osteoporosis     Primary osteoarthritis of both knees     s/p Rt TKA    Pulmonary embolism with infarction     Seizures     Subdeltoid bursitis, L>R. 9/27/2012    Ulcer     Vitamin D deficiency disease      Past Surgical History:   Procedure Laterality Date    BLADDER SUSPENSION      CATARACT EXTRACTION  11/18/13    left eye    CERVICAL LAMINECTOMY      x3, fusion x1    COLONOSCOPY  2009    HYSTERECTOMY      JOINT REPLACEMENT  2001    total right knee     LUMBAR LAMINECTOMY      x 3, fusion x1    OOPHORECTOMY               Anesthesia Evaluation    I have reviewed the Patient Summary Reports.    I have reviewed the Nursing Notes.   I have reviewed the Medications.     Review of Systems  Anesthesia Hx:  No problems with previous Anesthesia    Cardiovascular:   Hypertension    Pulmonary:   COPD Asthma    Renal/:   Chronic Renal Disease    Neurological:   Neuromuscular Disease, Headaches Seizures    Endocrine:  Endocrine Normal        Physical Exam  General:  Well nourished    Airway/Jaw/Neck:  Airway Findings: Mouth Opening: Normal Tongue: Normal  General Airway Assessment: Adult  Mallampati: II  TM Distance: Normal, at least 6 cm      Dental:  Dental Findings:   Chest/Lungs:  Chest/Lungs Findings: Clear to auscultation, Normal Respiratory Rate     Heart/Vascular:  Heart Findings: Rate:  Normal  Rhythm: Regular Rhythm  Sounds: Normal        Mental Status:  Mental Status Findings:  Cooperative, Alert and Oriented         Anesthesia Plan  Type of Anesthesia, risks & benefits discussed:  Anesthesia Type:  MAC, regional  Patient's Preference:   Intra-op Monitoring Plan:   Intra-op Monitoring Plan Comments:   Post Op Pain Control Plan: multimodal analgesia  Post Op Pain Control Plan Comments:   Induction:   IV  Beta Blocker:         Informed Consent: Patient understands risks and agrees with Anesthesia plan.  Questions answered. Anesthesia consent signed with patient.  ASA Score: 3     Day of Surgery Review of History & Physical:  There are no significant changes.          Ready For Surgery From Anesthesia Perspective.

## 2020-01-21 NOTE — DISCHARGE INSTRUCTIONS
ANESTHESIA  -For the first 24 hours after surgery:  Do not drive, use heavy equipment, make important decisions, or drink alcohol  -It is normal to feel sleepy for several hours.  Rest until you are more awake.  -Have someone stay with you, if needed.  They can watch for problems and help keep you safe.  -Some possible post anesthesia side effects include: nausea and vomiting, sore throat and hoarseness, sleepiness, and dizziness.    PAIN  -If you have pain after surgery, pain medicine will help you feel better.  Take it as directed, before pain becomes severe.  Most pain relievers taken by mouth need at least 20-30 minutes to start working.  -Do not drive or drink alcohol while taking pain medicine.  -Pain medication can upset your stomach.  Taking them with a little food may help.  -Other ways to help control pain: elevation, ice, and relaxation  -Call your surgeon if still having unmanageable pain an hour after taking pain medicine.  -Pain medicine can cause constipation.  Taking an over-the counter stool softener while on prescription pain medicine and drinking plenty of fluids can prevent this side effect.  -Call your surgeon if you have severe side effects like: breathing problems, trouble waking up, dizziness, confusion, or severe constipation.    NAUSEA  -Some people have nausea after surgery.  This is often because of anesthesia, pain, pain medicine, or the stress of surgery.  -Do not push yourself to eat.  Start off with clear liquids and soup.  Slowly move to solid foods.  Don't eat fatty, rich, spicy foods at first.  Eat smaller amounts.  -If you develop persistent nausea and vomiting please notify your surgeon immediately.    BLEEDING  -Different types of surgery require different types of care and dressing changes.  It is important to follow all instructions and advice from your surgeon.  Change dressing as directed.  Call your surgeon for any concerns regarding postop bleeding.    SIGNS OF  INFECTION  -Signs of infection include: fever, swelling, drainage, and redness  -Notify your surgeon if you have a fever of 100.4 F (38.0 C) or higher.  -Notify your surgeon if you notice redness, swelling, increased pain, pus, or a foul smell at the incision site.

## 2020-01-21 NOTE — ANESTHESIA PROCEDURE NOTES
Peripheral Block    Patient location during procedure: pre-op   Block not for primary anesthetic.  Reason for block: at surgeon's request and post-op pain management   Post-op Pain Location: left arm  Start time: 1/21/2020 11:08 AM  Timeout: 1/21/2020 11:05 AM   End time: 1/21/2020 11:12 AM    Staffing  Authorizing Provider: Tin Cid MD  Performing Provider: Sera Pineda MD    Preanesthetic Checklist  Completed: patient identified, site marked, surgical consent, pre-op evaluation, timeout performed, IV checked, risks and benefits discussed and monitors and equipment checked  Peripheral Block  Patient position: supine  Prep: ChloraPrep  Patient monitoring: heart rate, cardiac monitor, continuous pulse ox, continuous capnometry and frequent blood pressure checks  Block type: supraclavicular  Laterality: left  Injection technique: single shot  Needle  Needle type: Stimuplex   Needle gauge: 21 G  Needle length: 4 in  Needle localization: anatomical landmarks and ultrasound guidance   -ultrasound image captured on disc.  Assessment  Injection assessment: negative aspiration, negative parasthesia and local visualized surrounding nerve  Paresthesia pain: none  Heart rate change: no  Slow fractionated injection: yes  Additional Notes  VSS.  [facility] RN monitoring vitals throughout procedure.  Patient tolerated procedure well.     30 cc of 0.5% ropivacaine injected without complication

## 2020-01-21 NOTE — BRIEF OP NOTE
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]    Post-op Diagnosis: Post-Op Diagnosis Codes:     * Acute renal failure superimposed on chronic kidney disease, unspecified CKD stage, unspecified acute renal failure type [N17.9, N18.9]     * CKD (chronic kidney disease) stage 4, GFR 15-29 ml/min [N18.4]     * Acute kidney injury superimposed on CKD [N17.9, N18.9]     * 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]    Procedure(s) (LRB):  INSERTION, GRAFT, ARTERIOVENOUS (Left)    Anesthesia: Regional    Procedure in Detail/Findings:  Operative Note       Surgery Date: 1/21/2020     Surgeon(s) and Role:     * Lindsey Louie MD - Primary    Pre-op Diagnosis:  Acute renal failure superimposed on chronic kidney disease, unspecified CKD stage, unspecified acute renal failure type [N17.9, N18.9]  CKD (chronic kidney disease) stage 4, GFR 15-29 ml/min [N18.4]  Acute kidney injury superimposed on CKD [N17.9, N18.9]  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]    Post-op Diagnosis: Post-Op Diagnosis Codes:     * Acute renal failure superimposed on chronic kidney disease, unspecified CKD stage, unspecified acute renal failure type [N17.9, N18.9]     * CKD (chronic kidney disease) stage 4, GFR 15-29 ml/min [N18.4]     * Acute kidney injury superimposed on CKD [N17.9, N18.9]     * 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     Stockinette was applied.  An incision was made in the upper medial aspect of the   left upper arm.  Incision was carried down to the deep subcutaneous tissues.    Subcutaneous bleeders clamped and bovied, further taken down.  Brachial vein was   isolated.  Proximal and distal control was obtained.  Laterally, brachial   artery was isolated.  Proximal and distal control was obtained.  Another   incision was made in the distal right arm.  A tunnel was created between the two   incisions.  [place] 6 mm size was placed into the tunnel.  First graft-to-vein   anastomosis was created on the venous side using running 6-0 Prolene suture.    Then graft-to-artery anastomosis was created on the arterial side using running   6-0 Prolene suture.  The patient had good bruit after completion of procedure.    Hemostasis was satisfactorily maintained.  Wound was thoroughly irrigated with   antibiotic solution and then approximated using 3-0 Vicryl for subcutaneous   tissue.  Skin was closed using skin staple.  Sterile gauze dressing was applied.    Instrument count, sponge count, and needle count was correct.  The patient   tolerated it well.  Estimated blood loss was 30 mL.  No specimen was removed.    No intraoperative complications.    Estimated Blood Loss: 30 cc         Specimens (From admission, onward)    None        Implants:   Implant Name Type Inv. Item Serial No.  Lot No. LRB No. Used   GRAFT STRETCH RING [id number] - [id number]  GRAFT STRETCH RING [id number]  [employer] [id number] Left 1              Disposition: PACU - hemodynamically stable.           Condition: Good    Attestation:  I performed the procedure.           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  Qty: 20 tablet, Refills: 0    Comments: Quantity prescribed more than 7 day supply? Yes, quantity medically necessary         CONTINUE these medications which have NOT CHANGED    Details   albuterol (PROVENTIL) 2.5 mg /3 mL (0.083 %) nebulizer solution Take 3 mLs (2.5 mg total) by nebulization every 6 (six) hours as needed for Wheezing. Rescue  Qty: 100 each, Refills: 3      albuterol-ipratropium (DUO-NEB) 2.5 mg-0.5 mg/3 mL nebulizer solution Take 3 mLs by nebulization every 6 (six) hours as needed for Shortness of Breath. Rescue  Qty: 3 Box, Refills: 5      allopurinol (ZYLOPRIM) 100 MG tablet Take 1 tablet (100 mg total) by mouth once daily. Take 100 mg -on Monday and Friday only  Qty: 30 tablet, Refills: 1      amLODIPine (NORVASC) 10 MG tablet Take 10 mg by mouth once daily.      busPIRone (BUSPAR) 5 MG Tab Take 5 mg by mouth 3 (three) times daily.       diazePAM (VALIUM) 2 MG tablet Take 2 mg by mouth once daily.       furosemide (LASIX) 20 MG tablet Take 1 tablet (20 mg total) by mouth 2 (two) times daily.  Qty: 60 tablet, Refills: 11      !! HYDROcodone-acetaminophen (NORCO)  mg per tablet Take 1 tablet by mouth 3 (three) times daily as needed.  Qty: 90 tablet, Refills: 0    Comments: Medically necessary for more than 7 days due to chronic pain.  Associated Diagnoses: Chronic midline low back pain with right-sided sciatica; Chronic neck pain; Primary osteoarthritis of both hips      ondansetron (ZOFRAN-ODT) 4 MG TbDL Dissolve one tablet under  the tongue every 6 (six) hours as needed.  Qty: 30 tablet, Refills: 3      sodium bicarbonate 650 MG tablet Take 1 tablet (650 mg total) by mouth 2 (two) times daily.  Qty: 120 tablet, Refills: 11      zolpidem (AMBIEN) 5 MG Tab Take 5 mg by mouth every evening.   Refills: 5      baclofen (LIORESAL) 10 MG tablet TAKE 1 TABLET (10 MG TOTAL) BY MOUTH 3 (THREE) TIMES DAILY AS NEEDED (MUSCLE SPASMS).  Qty: 90 tablet, Refills: 1      calcium-vitamin D3 (CALCIUM 500 + D) 500 mg(1,250mg) -200 unit per tablet Take 1 tablet by mouth 2 (two) times daily with meals.      DULoxetine (CYMBALTA) 30 MG capsule TAKE 1 CAPSULE BY MOUTH EVERY DAY  Qty: 90 capsule, Refills: 1    Associated Diagnoses: Chronic midline low back pain with right-sided sciatica; Chronic neck pain; Primary osteoarthritis of both hips      fluticasone-umeclidin-vilanter (TRELEGY ELLIPTA) 100-62.5-25 mcg DsDv Inhale 1 puff by mouth into the lungs once daily.  Qty: 90 each, Refills: 3    Associated Diagnoses: Chronic obstructive pulmonary disease, unspecified COPD type      !! HYDROcodone-acetaminophen (NORCO)  mg per tablet Take 1 tablet by mouth 3 (three) times daily as needed.  Qty: 90 tablet, Refills: 0    Comments: Medically necessary for more than 7 days due to chronic pain.  Associated Diagnoses: Chronic midline low back pain with right-sided sciatica; Chronic neck pain; Primary osteoarthritis of both hips       !! - Potential duplicate medications found. Please discuss with provider.          Discharge Procedure Orders (must include Diet, Follow-up, Activity)   Discharge Procedure Orders (must include Diet, Follow-up, Activity)   Diet general     Keep surgical extremity elevated     Lifting restrictions     Call MD for:  temperature >100.4     Call MD for:  persistent nausea and vomiting     Call MD for:  severe uncontrolled pain     Call MD for:  redness, tenderness, or signs of infection (pain, swelling, redness, odor or green/yellow discharge  around incision site)     Leave dressing on - Keep it clean, dry, and intact until clinic visit        Discharge Date: No discharge date for patient encounter.      Estimated Blood Loss: * No values recorded between 1/21/2020 12:24 PM and 1/21/2020  1:32 PM *           Specimens (From admission, onward)    None        Implants:   Implant Name Type Inv. Item Serial No.  Lot No. LRB No. Used   GRAFT STRETCH RING [id number] - [id number]  GRAFT STRETCH RING [id number]  [employer] [id number] Left 1              Disposition: PACU - hemodynamically stable.           Condition: Good    Attestation:  I performed the procedure.           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  Qty: 20 tablet, Refills: 0    Comments: Quantity prescribed more than 7 day supply? Yes, quantity medically necessary         CONTINUE these medications which have NOT CHANGED    Details   albuterol (PROVENTIL) 2.5 mg /3 mL (0.083 %) nebulizer solution Take 3 mLs (2.5 mg total) by nebulization every 6 (six) hours as needed for Wheezing. Rescue  Qty: 100 each, Refills: 3      albuterol-ipratropium (DUO-NEB) 2.5 mg-0.5 mg/3 mL nebulizer solution Take 3 mLs by nebulization every 6 (six) hours as needed for Shortness of Breath. Rescue  Qty: 3 Box,  Refills: 5      allopurinol (ZYLOPRIM) 100 MG tablet Take 1 tablet (100 mg total) by mouth once daily. Take 100 mg -on Monday and Friday only  Qty: 30 tablet, Refills: 1      amLODIPine (NORVASC) 10 MG tablet Take 10 mg by mouth once daily.      busPIRone (BUSPAR) 5 MG Tab Take 5 mg by mouth 3 (three) times daily.       diazePAM (VALIUM) 2 MG tablet Take 2 mg by mouth once daily.       furosemide (LASIX) 20 MG tablet Take 1 tablet (20 mg total) by mouth 2 (two) times daily.  Qty: 60 tablet, Refills: 11      !! HYDROcodone-acetaminophen (NORCO)  mg per tablet Take 1 tablet by mouth 3 (three) times daily as needed.  Qty: 90 tablet, Refills: 0    Comments: Medically necessary for more than 7 days due to chronic pain.  Associated Diagnoses: Chronic midline low back pain with right-sided sciatica; Chronic neck pain; Primary osteoarthritis of both hips      ondansetron (ZOFRAN-ODT) 4 MG TbDL Dissolve one tablet under the tongue every 6 (six) hours as needed.  Qty: 30 tablet, Refills: 3      sodium bicarbonate 650 MG tablet Take 1 tablet (650 mg total) by mouth 2 (two) times daily.  Qty: 120 tablet, Refills: 11      zolpidem (AMBIEN) 5 MG Tab Take 5 mg by mouth every evening.   Refills: 5      baclofen (LIORESAL) 10 MG tablet TAKE 1 TABLET (10 MG TOTAL) BY MOUTH 3 (THREE) TIMES DAILY AS NEEDED (MUSCLE SPASMS).  Qty: 90 tablet, Refills: 1      calcium-vitamin D3 (CALCIUM 500 + D) 500 mg(1,250mg) -200 unit per tablet Take 1 tablet by mouth 2 (two) times daily with meals.      DULoxetine (CYMBALTA) 30 MG capsule TAKE 1 CAPSULE BY MOUTH EVERY DAY  Qty: 90 capsule, Refills: 1    Associated Diagnoses: Chronic midline low back pain with right-sided sciatica; Chronic neck pain; Primary osteoarthritis of both hips      fluticasone-umeclidin-vilanter (TRELEGY ELLIPTA) 100-62.5-25 mcg DsDv Inhale 1 puff by mouth into the lungs once daily.  Qty: 90 each, Refills: 3    Associated Diagnoses: Chronic obstructive pulmonary disease,  unspecified COPD type      !! HYDROcodone-acetaminophen (NORCO)  mg per tablet Take 1 tablet by mouth 3 (three) times daily as needed.  Qty: 90 tablet, Refills: 0    Comments: Medically necessary for more than 7 days due to chronic pain.  Associated Diagnoses: Chronic midline low back pain with right-sided sciatica; Chronic neck pain; Primary osteoarthritis of both hips       !! - Potential duplicate medications found. Please discuss with provider.          Discharge Procedure Orders (renal diet , no heavy lifting , keep dressing dry , rtc office one week.   Discharge Procedure Orders (must include Diet, Follow-up, Activity)   Diet general     Keep surgical extremity elevated     Lifting restrictions     Call MD for:  temperature >100.4     Call MD for:  persistent nausea and vomiting     Call MD for:  severe uncontrolled pain     Call MD for:  redness, tenderness, or signs of infection (pain, swelling, redness, odor or green/yellow discharge around incision site)     Leave dressing on - Keep it clean, dry, and intact until clinic visit        Discharge Date: 1/21/2019

## 2020-01-21 NOTE — PLAN OF CARE
VSS  NAD  Discharge instructions given with claimed understanding by pt and family. Patient has ride home with family or friend. Claims pain level is ___0___ at this time.  Has voided without difficulty if required by surgical type.   Dr Louie made ropunds and sw pt and is still ok to discharge. Instructed to [facility] ED for any problems with respirations or surgical site

## 2020-01-21 NOTE — TRANSFER OF CARE
"Anesthesia Transfer of Care Note    Patient: Katie Quevedo    Procedure(s) Performed: Procedure(s) (LRB):  INSERTION, GRAFT, ARTERIOVENOUS (Left)    Patient location: OPS    Anesthesia Type: MAC    Transport from OR: Transported from OR on room air with adequate spontaneous ventilation    Post pain: adequate analgesia    Post assessment: no apparent anesthetic complications and tolerated procedure well    Post vital signs: stable    Level of consciousness: awake    Nausea/Vomiting: no nausea/vomiting    Complications: none    Transfer of care protocol was followed      Last vitals:   Visit Vitals  /63 (BP Location: Right arm, Patient Position: Lying)   Pulse 91   Temp (!) 35 °C (95 °F) (Temporal)   Resp 20   Ht 5' 2" (1.575 m)   Wt 68.5 kg (151 lb)   SpO2 100%   Breastfeeding? No   BMI 27.62 kg/m²     "

## 2020-01-21 NOTE — ANESTHESIA POSTPROCEDURE EVALUATION
Anesthesia Post Evaluation    Patient: Katie Quevedo    Procedure(s) Performed: Procedure(s) (LRB):  INSERTION, GRAFT, ARTERIOVENOUS (Left)    Final Anesthesia Type: MAC    Patient location during evaluation: OPS  Patient participation: Yes- Able to Participate  Level of consciousness: awake  Post-procedure vital signs: reviewed and stable  Pain management: adequate  Airway patency: patent    PONV status at discharge: No PONV  Anesthetic complications: no      Cardiovascular status: hemodynamically stable  Respiratory status: unassisted and spontaneous ventilation  Hydration status: euvolemic  Follow-up not needed.          Vitals Value Taken Time   /63 1/21/2020 10:28 AM   Temp 35 °C (95 °F) 1/21/2020 10:28 AM   Pulse 91 1/21/2020 10:28 AM   Resp 20 1/21/2020 10:28 AM   SpO2 100 % 1/21/2020 10:28 AM         No case tracking events are documented in the log.      Pain/Nelson Score: No data recorded

## 2020-01-21 NOTE — PLAN OF CARE
Anesthesia assessed pt  work of breathing,. Pt uses accesarry muscles but this is her norm according to  and pt..oxygen sats are 97 on 2 liters and pt wears O2 at home with 93 sat norm..  Breathing treatment treatment ordered and given as pt did have exp wheezes to tony...denies distress or difference in resp status

## 2020-01-21 NOTE — OR NURSING
1105 Time out performed for L arm nerve block. VSS. 1108 Procedure started VSS see VS flowsheets. 1112 procedure complete. VSS NAD noted Patient tolerated well.

## 2020-01-24 ENCOUNTER — TELEPHONE (OUTPATIENT)
Dept: PRIMARY CARE CLINIC | Facility: CLINIC | Age: 75
End: 2020-01-24

## 2020-01-24 DIAGNOSIS — G89.29 CHRONIC MIDLINE LOW BACK PAIN WITH RIGHT-SIDED SCIATICA: ICD-10-CM

## 2020-01-24 DIAGNOSIS — M54.2 CHRONIC NECK PAIN: ICD-10-CM

## 2020-01-24 DIAGNOSIS — M16.0 PRIMARY OSTEOARTHRITIS OF BOTH HIPS: ICD-10-CM

## 2020-01-24 DIAGNOSIS — M54.41 CHRONIC MIDLINE LOW BACK PAIN WITH RIGHT-SIDED SCIATICA: ICD-10-CM

## 2020-01-24 DIAGNOSIS — G89.29 CHRONIC NECK PAIN: ICD-10-CM

## 2020-01-24 RX ORDER — HYDROCODONE BITARTRATE AND ACETAMINOPHEN 10; 325 MG/1; MG/1
1 TABLET ORAL 3 TIMES DAILY PRN
Qty: 90 TABLET | Refills: 0 | Status: ON HOLD | OUTPATIENT
Start: 2020-01-25 | End: 2020-02-20 | Stop reason: SDUPTHER

## 2020-01-24 NOTE — TELEPHONE ENCOUNTER
----- Message from Maria Luisa Palacio MA sent at 1/21/2020 10:32 AM CST -----  Please let her know her abdominal US did not show abnormality in her area of discomfort. Continue current plan of care and monitor symptoms. Bring them to my attention if the worsen.

## 2020-01-24 NOTE — TELEPHONE ENCOUNTER
Notified patient of no abnormalities shown and we will continue to monitor symptoms, notified to let us know if they worsen. Reminded of upcoming priority clinic appointment.

## 2020-01-24 NOTE — TELEPHONE ENCOUNTER
Last Rx refill-----12/28/19  Last office visit--11/04/19  Next office visit--03/16/20    ----- Message from Shial Ambriz sent at 1/24/2020  9:42 AM CST -----  Contact: self @ [phone]  Pt is req a refill for hydrocodone 5/325.     [id number] - [place] - [address]      [phone] (Phone)  [phone] (Fax)

## 2020-01-27 ENCOUNTER — LAB VISIT (OUTPATIENT)
Dept: LAB | Facility: HOSPITAL | Age: 75
End: 2020-01-27
Attending: INTERNAL MEDICINE
Payer: MEDICARE

## 2020-01-27 DIAGNOSIS — N17.9 ACUTE KIDNEY INJURY SUPERIMPOSED ON CKD: ICD-10-CM

## 2020-01-27 DIAGNOSIS — N18.9 ACUTE KIDNEY INJURY SUPERIMPOSED ON CKD: ICD-10-CM

## 2020-01-27 LAB
ANION GAP SERPL CALC-SCNC: 9 MMOL/L (ref 8–16)
BUN SERPL-MCNC: 26 MG/DL (ref 8–23)
CALCIUM SERPL-MCNC: 9.9 MG/DL (ref 8.7–10.5)
CHLORIDE SERPL-SCNC: 98 MMOL/L (ref 95–110)
CO2 SERPL-SCNC: 27 MMOL/L (ref 23–29)
CREAT SERPL-MCNC: 2.3 MG/DL (ref 0.5–1.4)
EST. GFR  (AFRICAN AMERICAN): 23 ML/MIN/1.73 M^2
EST. GFR  (NON AFRICAN AMERICAN): 20 ML/MIN/1.73 M^2
GLUCOSE SERPL-MCNC: 93 MG/DL (ref 70–110)
POTASSIUM SERPL-SCNC: 5 MMOL/L (ref 3.5–5.1)
SODIUM SERPL-SCNC: 134 MMOL/L (ref 136–145)

## 2020-01-27 PROCEDURE — 80048 BASIC METABOLIC PNL TOTAL CA: CPT | Mod: HCNC

## 2020-01-27 PROCEDURE — 36415 COLL VENOUS BLD VENIPUNCTURE: CPT | Mod: HCNC

## 2020-01-29 ENCOUNTER — OFFICE VISIT (OUTPATIENT)
Dept: CARDIOLOGY | Facility: CLINIC | Age: 75
End: 2020-01-29
Payer: MEDICARE

## 2020-01-29 VITALS
BODY MASS INDEX: 21.25 KG/M2 | HEIGHT: 66 IN | HEART RATE: 97 BPM | WEIGHT: 132.25 LBS | DIASTOLIC BLOOD PRESSURE: 68 MMHG | OXYGEN SATURATION: 97 % | SYSTOLIC BLOOD PRESSURE: 115 MMHG

## 2020-01-29 DIAGNOSIS — N18.4 CKD (CHRONIC KIDNEY DISEASE) STAGE 4, GFR 15-29 ML/MIN: Primary | ICD-10-CM

## 2020-01-29 DIAGNOSIS — I10 ESSENTIAL HYPERTENSION: Chronic | ICD-10-CM

## 2020-01-29 DIAGNOSIS — I51.9 DIASTOLIC DYSFUNCTION, LEFT VENTRICLE: ICD-10-CM

## 2020-01-29 PROCEDURE — 1101F PR PT FALLS ASSESS DOC 0-1 FALLS W/OUT INJ PAST YR: ICD-10-PCS | Mod: HCNC,CPTII,S$GLB, | Performed by: STUDENT IN AN ORGANIZED HEALTH CARE EDUCATION/TRAINING PROGRAM

## 2020-01-29 PROCEDURE — 1159F MED LIST DOCD IN RCRD: CPT | Mod: HCNC,S$GLB,, | Performed by: STUDENT IN AN ORGANIZED HEALTH CARE EDUCATION/TRAINING PROGRAM

## 2020-01-29 PROCEDURE — 1101F PT FALLS ASSESS-DOCD LE1/YR: CPT | Mod: HCNC,CPTII,S$GLB, | Performed by: STUDENT IN AN ORGANIZED HEALTH CARE EDUCATION/TRAINING PROGRAM

## 2020-01-29 PROCEDURE — 99214 OFFICE O/P EST MOD 30 MIN: CPT | Mod: HCNC,S$GLB,, | Performed by: STUDENT IN AN ORGANIZED HEALTH CARE EDUCATION/TRAINING PROGRAM

## 2020-01-29 PROCEDURE — 99499 UNLISTED E&M SERVICE: CPT | Mod: HCNC,S$GLB,, | Performed by: STUDENT IN AN ORGANIZED HEALTH CARE EDUCATION/TRAINING PROGRAM

## 2020-01-29 PROCEDURE — 99214 PR OFFICE/OUTPT VISIT, EST, LEVL IV, 30-39 MIN: ICD-10-PCS | Mod: HCNC,S$GLB,, | Performed by: STUDENT IN AN ORGANIZED HEALTH CARE EDUCATION/TRAINING PROGRAM

## 2020-01-29 PROCEDURE — 3078F DIAST BP <80 MM HG: CPT | Mod: HCNC,CPTII,S$GLB, | Performed by: STUDENT IN AN ORGANIZED HEALTH CARE EDUCATION/TRAINING PROGRAM

## 2020-01-29 PROCEDURE — 3078F PR MOST RECENT DIASTOLIC BLOOD PRESSURE < 80 MM HG: ICD-10-PCS | Mod: HCNC,CPTII,S$GLB, | Performed by: STUDENT IN AN ORGANIZED HEALTH CARE EDUCATION/TRAINING PROGRAM

## 2020-01-29 PROCEDURE — 1126F AMNT PAIN NOTED NONE PRSNT: CPT | Mod: HCNC,S$GLB,, | Performed by: STUDENT IN AN ORGANIZED HEALTH CARE EDUCATION/TRAINING PROGRAM

## 2020-01-29 PROCEDURE — 1126F PR PAIN SEVERITY QUANTIFIED, NO PAIN PRESENT: ICD-10-PCS | Mod: HCNC,S$GLB,, | Performed by: STUDENT IN AN ORGANIZED HEALTH CARE EDUCATION/TRAINING PROGRAM

## 2020-01-29 PROCEDURE — 3074F PR MOST RECENT SYSTOLIC BLOOD PRESSURE < 130 MM HG: ICD-10-PCS | Mod: HCNC,CPTII,S$GLB, | Performed by: STUDENT IN AN ORGANIZED HEALTH CARE EDUCATION/TRAINING PROGRAM

## 2020-01-29 PROCEDURE — 99499 RISK ADDL DX/OHS AUDIT: ICD-10-PCS | Mod: HCNC,S$GLB,, | Performed by: STUDENT IN AN ORGANIZED HEALTH CARE EDUCATION/TRAINING PROGRAM

## 2020-01-29 PROCEDURE — 1159F PR MEDICATION LIST DOCUMENTED IN MEDICAL RECORD: ICD-10-PCS | Mod: HCNC,S$GLB,, | Performed by: STUDENT IN AN ORGANIZED HEALTH CARE EDUCATION/TRAINING PROGRAM

## 2020-01-29 PROCEDURE — 3074F SYST BP LT 130 MM HG: CPT | Mod: HCNC,CPTII,S$GLB, | Performed by: STUDENT IN AN ORGANIZED HEALTH CARE EDUCATION/TRAINING PROGRAM

## 2020-01-29 PROCEDURE — 99999 PR PBB SHADOW E&M-EST. PATIENT-LVL III: CPT | Mod: PBBFAC,HCNC,, | Performed by: STUDENT IN AN ORGANIZED HEALTH CARE EDUCATION/TRAINING PROGRAM

## 2020-01-29 PROCEDURE — 99999 PR PBB SHADOW E&M-EST. PATIENT-LVL III: ICD-10-PCS | Mod: PBBFAC,HCNC,, | Performed by: STUDENT IN AN ORGANIZED HEALTH CARE EDUCATION/TRAINING PROGRAM

## 2020-01-29 NOTE — PROGRESS NOTES
"   Cardiology Clinic note    Subjective:   Patient ID:  Katie Quevedo is a 74 y.o. female who presents for evaluation of diastolic heart failure    HPI:   Katie Quevedo  has a past medical history of Anemia, Bilateral renal cysts, Cataract, Chronic LBP (7/26/2012), Chronic pain, CKD (chronic kidney disease), stage IV, COPD (chronic obstructive pulmonary disease), Dehydration, Encounter for blood transfusion, HTN (hypertension), Lumbar spondylosis, Melanoma, Metabolic bone disease, Migraines, neuralgic, Osteoporosis, Primary osteoarthritis of both knees, Pulmonary embolism with infarction, Seizures (1972), Subdeltoid bursitis, L>R. (9/27/2012), Ulcer, and Vitamin D deficiency disease.    10/30/19: new patient, referred from Dr. Gambino. Pt previous saw Dr. Morrow in 2017 for possible PE at that time.  Pt has longstanding COPD and progressive CKD at this time.   Her echo is c/w grade II diastolic dysfunction. Pt notes a significant change in her BERTRAND and functional capacity. Whether this is due to progressive COPD vs diastolic heart failure, unsure.  Notes she has to use a scotter for long distances. Smoker for 50 plus years, quit about 5 years ago.  No prior hx of CAD. Normal LVEF.    Jan 29th 2020:  Pt seen and examined. She had formation of her AV fistula by vascular surgery. Pt is otherwise doing well. Still not smoking anyone. She has stable BERTRAND.  Here for routine follow up. She still makes some urine.    Vitals  Vitals:    01/29/20 1515   BP: 115/68   Pulse: 97   SpO2: 97%   Weight: 60 kg (132 lb 4.4 oz)   Height: 5' 6" (1.676 m)       Patient Active Problem List    Diagnosis Date Noted    Diastolic dysfunction, left ventricle 02/04/2020    Thrombosis of renal dialysis arteriovenous graft 02/03/2020    Clotted renal dialysis AV graft 02/03/2020    CRF (chronic renal failure), stage 4 (severe) 01/21/2020    Non-intractable vomiting with nausea 01/14/2020    YOSVANY (acute kidney injury) 01/14/2020    CKD (chronic " kidney disease) stage 4, GFR 15-29 ml/min 12/18/2019     Noted.      COPD (chronic obstructive pulmonary disease) 12/18/2019     Pt has MMRC 3 symptoms of dyspnea & multiple hospitalizations for difficulty breathing. Recommend changing spiriva inhaler to trelegy. Continue prn nebulizer treatments. Recommended pulmonary rehab though the commute may be too much for her (she lives in [place]). She will get back to me. I suggested regular aerobic exercise as the next best alternative to pulmonary rehab.      Chronic diastolic heart failure 10/30/2019     She takes lasix 20mg BID. Continue lasix & follow up with cardiology.      Lipoma of torso 10/10/2019    Chronic respiratory failure with hypoxia, on home O2 therapy 07/18/2019    Acute kidney injury superimposed on CKD     Anemia, chronic renal failure, stage 4 (severe)     Pericardial effusion 07/05/2019    Pleural effusion      Transudate based on light's criteria. Repeat CXR to follow up.      Hyperkalemia 03/05/2018    History of colon polyps 02/06/2018    Atrophy of left kidney 01/25/2018    Kidney cysts 01/25/2018    Iron deficiency anemia 01/22/2018    Essential hypertension 01/22/2018    Chronic low back pain 09/25/2015    Osteoporosis 09/21/2015    Osteoarthritis, hip, bilateral 10/27/2014    Lumbar radiculopathy, BLE 10/27/2014    Cervical radiculopathy, BUE 10/27/2014    Biceps tendonitis 01/06/2014    Rotator cuff tear, right 10/18/2013    Nuclear sclerosis - Both Eyes 08/08/2013    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    Melanoma     Chronic pain     Vitamin deficiency        Patient's Medications   New  Prescriptions    CYPROHEPTADINE (PERIACTIN) 4 MG TABLET    Take 1 tablet (4 mg total) by mouth every 8 (eight) hours.   Previous Medications    ALBUTEROL (PROVENTIL) 2.5 MG /3 ML (0.083 %) NEBULIZER SOLUTION    Take 3 mLs (2.5 mg total) by nebulization every 6 (six) hours as needed for Wheezing. Rescue    ALBUTEROL-IPRATROPIUM (DUO-NEB) 2.5 MG-0.5 MG/3 ML NEBULIZER SOLUTION    Take 3 mLs by nebulization every 6 (six) hours as needed for Shortness of Breath. Rescue    ALLOPURINOL (ZYLOPRIM) 100 MG TABLET    Take 1 tablet (100 mg total) by mouth once daily. Take 100 mg -on Monday and Friday only    AMLODIPINE (NORVASC) 10 MG TABLET    Take 10 mg by mouth once daily.    BUSPIRONE (BUSPAR) 5 MG TAB    Take 5 mg by mouth 3 (three) times daily.     DIAZEPAM (VALIUM) 2 MG TABLET    Take 2 mg by mouth once daily.     DIPHENHYDRAMINE (BENADRYL) 25 MG CAPSULE    Take 25 mg by mouth every 6 (six) hours as needed for Itching.    FLUTICASONE-UMECLIDIN-VILANTER (TRELEGY ELLIPTA) 100-62.5-25 MCG DSDV    Inhale 1 puff by mouth into the lungs once daily.    FUROSEMIDE (LASIX) 20 MG TABLET    Take 1 tablet (20 mg total) by mouth 2 (two) times daily.    HYDROCODONE-ACETAMINOPHEN (NORCO)  MG PER TABLET    Take 1 tablet by mouth 3 (three) times daily as needed.    ONDANSETRON (ZOFRAN-ODT) 4 MG TBDL    Dissolve one tablet under the tongue every 6 (six) hours as needed.    SODIUM BICARBONATE 650 MG TABLET    Take 1 tablet (650 mg total) by mouth 2 (two) times daily.    TRAZODONE (DESYREL) 100 MG TABLET    Take 100 mg by mouth nightly as needed for Insomnia.    ZOLPIDEM (AMBIEN) 5 MG TAB    Take 5 mg by mouth every evening.    Modified Medications    Modified Medication Previous Medication    HYDROCODONE-ACETAMINOPHEN (NORCO) 5-325 MG PER TABLET HYDROcodone-acetaminophen (NORCO) 5-325 mg per tablet       Take 1 tablet by mouth every 6 (six) hours as needed for Pain.    Take 1 tablet by mouth every 6 (six) hours as needed for Pain.    Discontinued Medications    BACLOFEN (LIORESAL) 10 MG TABLET    TAKE 1 TABLET (10 MG TOTAL) BY MOUTH 3 (THREE) TIMES DAILY AS NEEDED (MUSCLE SPASMS).    CALCIUM-VITAMIN D3 (CALCIUM 500 + D) 500 MG(1,250MG) -200 UNIT PER TABLET    Take 1 tablet by mouth 2 (two) times daily with meals.    DULOXETINE (CYMBALTA) 30 MG CAPSULE    TAKE 1 CAPSULE BY MOUTH EVERY DAY         Review of Systems   Constitution: Positive for malaise/fatigue. Negative for chills, decreased appetite and weight gain.   HENT: Negative for congestion and ear discharge.    Eyes: Negative for blurred vision and double vision.   Cardiovascular: Positive for dyspnea on exertion. Negative for chest pain, cyanosis, irregular heartbeat, leg swelling, near-syncope, orthopnea, palpitations and syncope.   Respiratory: Negative for cough, shortness of breath and sleep disturbances due to breathing.    Skin: Negative for color change and dry skin.   Musculoskeletal: Negative for joint pain, joint swelling and muscle cramps.   Gastrointestinal: Negative for bloating, heartburn, hematemesis and hematochezia.   Genitourinary: Negative for bladder incontinence and dysuria.   Neurological: Negative for aphonia, excessive daytime sleepiness, dizziness, focal weakness, headaches, light-headedness, loss of balance and weakness.   Psychiatric/Behavioral: Negative for altered mental status, depression and memory loss. The patient does not have insomnia and is not nervous/anxious.          Objective:   Physical Exam   Constitutional: She is oriented to person, place, and time. She appears well-developed and well-nourished.   HENT:   Head: Normocephalic and atraumatic.   Eyes: Conjunctivae and EOM are normal.   Neck: Normal range of motion. Neck supple. No JVD present.   Cardiovascular: Normal rate, regular rhythm and normal heart sounds.   No murmur heard.  Pulmonary/Chest: Effort normal and breath sounds normal. No respiratory distress. She has no wheezes. She has no  rales.   Abdominal: Soft. Bowel sounds are normal. She exhibits no distension.   Musculoskeletal: Normal range of motion. She exhibits no edema.   Neurological: She is alert and oriented to person, place, and time.   Skin: Skin is warm and dry. No erythema.   Psychiatric: She has a normal mood and affect. Her behavior is normal. Judgment and thought content normal.   Nursing note and vitals reviewed.      Lab Results    Lab Results   Component Value Date     (L) 02/03/2020    K 4.1 02/03/2020    CL 87 (L) 02/03/2020    CO2 29 02/03/2020    BUN 32 (H) 02/03/2020    CREATININE 2.9 (H) 02/03/2020    GLU 99 02/03/2020    HGBA1C 5.2 01/22/2018    MG 2.4 01/16/2020    AST 24 01/14/2020    ALT 10 01/14/2020    ALBUMIN 4.0 01/14/2020    PROT 8.4 01/14/2020    BILITOT 0.3 01/14/2020    WBC 7.04 01/16/2020    HGB 9.3 (L) 01/16/2020    HCT 29.8 (L) 01/16/2020    MCV 93 01/16/2020     01/16/2020    INR 1.0 07/17/2019    TSH 0.572 07/17/2019    CHOL 166 07/17/2019    HDL 47 07/17/2019    LDLCALC 89.8 07/17/2019    TRIG 146 07/17/2019    CRP 3.0 01/23/2009     (H) 09/13/2019       Lipid panel  Lab Results   Component Value Date    CHOL 166 07/17/2019     Lab Results   Component Value Date    HDL 47 07/17/2019     Lab Results   Component Value Date    LDLCALC 89.8 07/17/2019     Lab Results   Component Value Date    TRIG 146 07/17/2019       Cardiac Studies  Significant Imaging: Echocardiogram: .Sept 2019  · Normal left ventricular systolic function. The estimated ejection fraction is 55%  · Grade II (moderate) left ventricular diastolic dysfunction consistent with pseudonormalization.  · Elevated left atrial pressure.  · No wall motion abnormalities.  · Normal right ventricular systolic function.  · No TR Jet to calculate PA pressure  · Intermediate central venous pressure (8 mm Hg).  · There is a large left pleural effusion.         ECG:  sinus tachycardia.    Cath study : n/a    Assessment:     1. CKD  (chronic kidney disease) stage 4, GFR 15-29 ml/min    2. Essential hypertension    3. Diastolic dysfunction, left ventricle        Plan:     1. Chronic diastolic heart failure  - known DD since at least 2018  - grade II, pseudonorm with elevated LAP  - already on lasix 20mg - good response with lasix  - soft rec to try to add ARB to regiment for cardiac remodeling. Will hold ARB until on HD   - looks to be well compensated at this time,  talked about low salt diet, <2000mg per day    2. COPD  - mx per pulm/pcp    3. CKD  - starting to get a port and possible av fistula  - mx per nephrology    4. HTN controlled    5. Tobacco use: 50 yr hx, 5 yrs quit  C/w cessation    Continue with current medical plan and lifestyle changes.  Return sooner for concerns or questions. If symptoms persist go to the ED  Total duration of face to face visit time 30 minutes.  Total time spent counseling greater than fifty percent of total visit time.  Counseling included discussion regarding imaging findings, diagnosis, possibilities, treatment options, risks and benefits.      No orders of the defined types were placed in this encounter.      Follow up as scheduled. Return to clinic in 3 months - potentially adding arb if renal function is stable   She expressed verbal understanding and agreed with the plan    Thank you for the opportunity to care for this patient. Will be available for questions if needed.     Jason Anderson MD [facility]  Interventional Cardiology  [facility]  Pager: [phone]

## 2020-02-03 ENCOUNTER — HOSPITAL ENCOUNTER (OUTPATIENT)
Facility: HOSPITAL | Age: 75
Discharge: HOME | End: 2020-02-03
Attending: SURGERY | Admitting: SURGERY
Payer: MEDICARE

## 2020-02-03 ENCOUNTER — NURSE TRIAGE (OUTPATIENT)
Dept: ADMINISTRATIVE | Facility: CLINIC | Age: 75
End: 2020-02-03

## 2020-02-03 ENCOUNTER — ANESTHESIA (OUTPATIENT)
Dept: SURGERY | Facility: HOSPITAL | Age: 75
End: 2020-02-03
Payer: MEDICARE

## 2020-02-03 ENCOUNTER — ANESTHESIA EVENT (OUTPATIENT)
Dept: SURGERY | Facility: HOSPITAL | Age: 75
End: 2020-02-03
Payer: MEDICARE

## 2020-02-03 VITALS
OXYGEN SATURATION: 95 % | DIASTOLIC BLOOD PRESSURE: 59 MMHG | HEART RATE: 74 BPM | WEIGHT: 132 LBS | RESPIRATION RATE: 17 BRPM | TEMPERATURE: 98 F | SYSTOLIC BLOOD PRESSURE: 105 MMHG | HEIGHT: 66 IN | BODY MASS INDEX: 21.21 KG/M2

## 2020-02-03 DIAGNOSIS — N26.1 ATROPHY OF LEFT KIDNEY: ICD-10-CM

## 2020-02-03 DIAGNOSIS — T82.868A THROMBOSIS OF KIDNEY DIALYSIS ARTERIOVENOUS GRAFT, INITIAL ENCOUNTER: ICD-10-CM

## 2020-02-03 DIAGNOSIS — N18.9 ACUTE KIDNEY INJURY SUPERIMPOSED ON CKD: ICD-10-CM

## 2020-02-03 DIAGNOSIS — N17.9 ACUTE KIDNEY INJURY SUPERIMPOSED ON CKD: ICD-10-CM

## 2020-02-03 DIAGNOSIS — I10 ESSENTIAL HYPERTENSION: Chronic | ICD-10-CM

## 2020-02-03 DIAGNOSIS — J44.9 CHRONIC OBSTRUCTIVE PULMONARY DISEASE, UNSPECIFIED COPD TYPE: ICD-10-CM

## 2020-02-03 DIAGNOSIS — N18.4 CRF (CHRONIC RENAL FAILURE), STAGE 4 (SEVERE): Primary | ICD-10-CM

## 2020-02-03 DIAGNOSIS — N18.4 CKD (CHRONIC KIDNEY DISEASE) STAGE 4, GFR 15-29 ML/MIN: ICD-10-CM

## 2020-02-03 LAB
ANION GAP SERPL CALC-SCNC: 12 MMOL/L (ref 8–16)
BUN SERPL-MCNC: 32 MG/DL (ref 8–23)
CALCIUM SERPL-MCNC: 9.8 MG/DL (ref 8.7–10.5)
CHLORIDE SERPL-SCNC: 87 MMOL/L (ref 95–110)
CO2 SERPL-SCNC: 29 MMOL/L (ref 23–29)
CREAT SERPL-MCNC: 2.9 MG/DL (ref 0.5–1.4)
EST. GFR  (AFRICAN AMERICAN): 17 ML/MIN/1.73 M^2
EST. GFR  (NON AFRICAN AMERICAN): 15 ML/MIN/1.73 M^2
GLUCOSE SERPL-MCNC: 99 MG/DL (ref 70–110)
POTASSIUM SERPL-SCNC: 4.1 MMOL/L (ref 3.5–5.1)
SODIUM SERPL-SCNC: 128 MMOL/L (ref 136–145)

## 2020-02-03 PROCEDURE — 01844 ANES VASC SHUNT/SHUNT REVJ: CPT | Mod: HCNC | Performed by: SURGERY

## 2020-02-03 PROCEDURE — 63600175 PHARM REV CODE 636 W HCPCS: Mod: HCNC | Performed by: NURSE ANESTHETIST, CERTIFIED REGISTERED

## 2020-02-03 PROCEDURE — 25000003 PHARM REV CODE 250: Mod: HCNC | Performed by: NURSE ANESTHETIST, CERTIFIED REGISTERED

## 2020-02-03 PROCEDURE — 36000707: Mod: HCNC | Performed by: SURGERY

## 2020-02-03 PROCEDURE — 37000009 HC ANESTHESIA EA ADD 15 MINS: Mod: HCNC | Performed by: SURGERY

## 2020-02-03 PROCEDURE — S0077 INJECTION, CLINDAMYCIN PHOSP: HCPCS | Mod: HCNC | Performed by: NURSE ANESTHETIST, CERTIFIED REGISTERED

## 2020-02-03 PROCEDURE — C1757 CATH, THROMBECTOMY/EMBOLECT: HCPCS | Mod: HCNC | Performed by: SURGERY

## 2020-02-03 PROCEDURE — 36415 COLL VENOUS BLD VENIPUNCTURE: CPT | Mod: HCNC

## 2020-02-03 PROCEDURE — 37000008 HC ANESTHESIA 1ST 15 MINUTES: Mod: HCNC | Performed by: SURGERY

## 2020-02-03 PROCEDURE — 63600175 PHARM REV CODE 636 W HCPCS: Mod: HCNC | Performed by: SURGERY

## 2020-02-03 PROCEDURE — 71000015 HC POSTOP RECOV 1ST HR: Mod: HCNC | Performed by: SURGERY

## 2020-02-03 PROCEDURE — 36000706: Mod: HCNC | Performed by: SURGERY

## 2020-02-03 PROCEDURE — 80048 BASIC METABOLIC PNL TOTAL CA: CPT | Mod: HCNC

## 2020-02-03 PROCEDURE — 25000003 PHARM REV CODE 250: Mod: HCNC | Performed by: SURGERY

## 2020-02-03 PROCEDURE — 71000016 HC POSTOP RECOV ADDL HR: Mod: HCNC | Performed by: SURGERY

## 2020-02-03 RX ORDER — HYDROCODONE BITARTRATE AND ACETAMINOPHEN 5; 325 MG/1; MG/1
1 TABLET ORAL EVERY 4 HOURS PRN
Status: DISCONTINUED | OUTPATIENT
Start: 2020-02-03 | End: 2020-02-03

## 2020-02-03 RX ORDER — HEPARIN SODIUM 1000 [USP'U]/ML
INJECTION, SOLUTION INTRAVENOUS; SUBCUTANEOUS
Status: DISCONTINUED | OUTPATIENT
Start: 2020-02-03 | End: 2020-02-03

## 2020-02-03 RX ORDER — HYDROCODONE BITARTRATE AND ACETAMINOPHEN 10; 325 MG/1; MG/1
1 TABLET ORAL EVERY 6 HOURS PRN
Status: DISCONTINUED | OUTPATIENT
Start: 2020-02-03 | End: 2020-02-03 | Stop reason: HOSPADM

## 2020-02-03 RX ORDER — MUPIROCIN 20 MG/G
OINTMENT TOPICAL
Status: DISCONTINUED | OUTPATIENT
Start: 2020-02-03 | End: 2020-02-03

## 2020-02-03 RX ORDER — DIPHENHYDRAMINE HCL 25 MG
25 CAPSULE ORAL EVERY 6 HOURS PRN
Status: ON HOLD | COMMUNITY
End: 2022-12-12 | Stop reason: HOSPADM

## 2020-02-03 RX ORDER — PROPOFOL 10 MG/ML
VIAL (ML) INTRAVENOUS CONTINUOUS PRN
Status: DISCONTINUED | OUTPATIENT
Start: 2020-02-03 | End: 2020-02-03

## 2020-02-03 RX ORDER — CLINDAMYCIN PHOSPHATE 900 MG/50ML
INJECTION, SOLUTION INTRAVENOUS
Status: DISCONTINUED | OUTPATIENT
Start: 2020-02-03 | End: 2020-02-03

## 2020-02-03 RX ORDER — HEPARIN SODIUM 5000 [USP'U]/ML
INJECTION, SOLUTION INTRAVENOUS; SUBCUTANEOUS
Status: DISCONTINUED | OUTPATIENT
Start: 2020-02-03 | End: 2020-02-03 | Stop reason: HOSPADM

## 2020-02-03 RX ORDER — HYDROCODONE BITARTRATE AND ACETAMINOPHEN 5; 325 MG/1; MG/1
1 TABLET ORAL EVERY 6 HOURS PRN
Qty: 10 TABLET | Refills: 0 | Status: ON HOLD | OUTPATIENT
Start: 2020-02-03 | End: 2020-02-11 | Stop reason: CLARIF

## 2020-02-03 RX ORDER — LIDOCAINE HCL/PF 100 MG/5ML
SYRINGE (ML) INTRAVENOUS
Status: DISCONTINUED | OUTPATIENT
Start: 2020-02-03 | End: 2020-02-03

## 2020-02-03 RX ORDER — PHENYLEPHRINE HYDROCHLORIDE 10 MG/ML
INJECTION INTRAVENOUS
Status: DISCONTINUED | OUTPATIENT
Start: 2020-02-03 | End: 2020-02-03

## 2020-02-03 RX ORDER — MIDAZOLAM HYDROCHLORIDE 1 MG/ML
INJECTION, SOLUTION INTRAMUSCULAR; INTRAVENOUS
Status: DISCONTINUED | OUTPATIENT
Start: 2020-02-03 | End: 2020-02-03

## 2020-02-03 RX ORDER — TRAZODONE HYDROCHLORIDE 100 MG/1
100 TABLET ORAL NIGHTLY PRN
COMMUNITY
End: 2020-12-04

## 2020-02-03 RX ORDER — SODIUM CHLORIDE 9 MG/ML
INJECTION, SOLUTION INTRAVENOUS CONTINUOUS
Status: DISCONTINUED | OUTPATIENT
Start: 2020-02-03 | End: 2020-02-03 | Stop reason: HOSPADM

## 2020-02-03 RX ORDER — ACETAMINOPHEN 325 MG/1
650 TABLET ORAL EVERY 4 HOURS PRN
Status: DISCONTINUED | OUTPATIENT
Start: 2020-02-03 | End: 2020-02-03 | Stop reason: HOSPADM

## 2020-02-03 RX ORDER — VASOPRESSIN 20 [USP'U]/ML
INJECTION, SOLUTION INTRAMUSCULAR; SUBCUTANEOUS
Status: DISCONTINUED | OUTPATIENT
Start: 2020-02-03 | End: 2020-02-03

## 2020-02-03 RX ORDER — BACITRACIN 50000 [IU]/1
INJECTION, POWDER, FOR SOLUTION INTRAMUSCULAR
Status: DISCONTINUED | OUTPATIENT
Start: 2020-02-03 | End: 2020-02-03 | Stop reason: HOSPADM

## 2020-02-03 RX ORDER — LIDOCAINE HYDROCHLORIDE 10 MG/ML
INJECTION, SOLUTION EPIDURAL; INFILTRATION; INTRACAUDAL; PERINEURAL
Status: DISCONTINUED | OUTPATIENT
Start: 2020-02-03 | End: 2020-02-03 | Stop reason: HOSPADM

## 2020-02-03 RX ADMIN — VASOPRESSIN 1 UNITS: 20 INJECTION INTRAVENOUS at 01:02

## 2020-02-03 RX ADMIN — CLINDAMYCIN IN 5 PERCENT DEXTROSE 600 MG: 18 INJECTION, SOLUTION INTRAVENOUS at 01:02

## 2020-02-03 RX ADMIN — PROPOFOL 75 MCG/KG/MIN: 10 INJECTION, EMULSION INTRAVENOUS at 01:02

## 2020-02-03 RX ADMIN — Medication 50 MG: at 01:02

## 2020-02-03 RX ADMIN — HYDROCODONE BITARTRATE AND ACETAMINOPHEN 1 TABLET: 10; 325 TABLET ORAL at 02:02

## 2020-02-03 RX ADMIN — PHENYLEPHRINE HYDROCHLORIDE 200 MCG: 10 INJECTION INTRAVENOUS at 01:02

## 2020-02-03 RX ADMIN — MIDAZOLAM 1 MG: 1 INJECTION INTRAMUSCULAR; INTRAVENOUS at 12:02

## 2020-02-03 RX ADMIN — SODIUM CHLORIDE: 0.9 INJECTION, SOLUTION INTRAVENOUS at 12:02

## 2020-02-03 RX ADMIN — HEPARIN SODIUM 3000 UNITS: 1000 INJECTION INTRAVENOUS; SUBCUTANEOUS at 01:02

## 2020-02-03 NOTE — H&P
Today`s Date: 2/3/2020     Admit Date: 2/3/2020    Admitting Physician: Lindsey Louie MD    Patient`s Name: Katie Quevedo , 74 y.o. female    HISTORY AND CHIEF COMPLAINT  For declotting great left upper arm  Patient Active Problem List   Diagnosis    Melanoma    Chronic pain    Vitamin deficiency    Cervical post-laminectomy syndrome    Lumbar postlaminectomy syndrome    Chronic neck pain    Lumbosacral spondylosis without myelopathy    Isolated cervical dystonia    Primary osteoarthritis of both knees    Subdeltoid bursitis, L>R.    Other fragments of torsion dystonia    Nuclear sclerosis - Both Eyes    Rotator cuff tear, right    Biceps tendonitis    Osteoarthritis, hip, bilateral    Lumbar radiculopathy, BLE    Cervical radiculopathy, BUE    Osteoporosis    Chronic low back pain    Iron deficiency anemia    Essential hypertension    Atrophy of left kidney    Kidney cysts    History of colon polyps    Hyperkalemia    Pleural effusion    Pericardial effusion    Chronic respiratory failure with hypoxia, on home O2 therapy    Acute kidney injury superimposed on CKD    Anemia, chronic renal failure, stage 4 (severe)    Lipoma of torso    Chronic diastolic heart failure    CKD (chronic kidney disease) stage 4, GFR 15-29 ml/min    COPD (chronic obstructive pulmonary disease)    Non-intractable vomiting with nausea    YOSVANY (acute kidney injury)    CRF (chronic renal failure), stage 4 (severe)       Past Medical History:   Diagnosis Date    Anemia     Asthma     Bilateral renal cysts     Cataract     Chronic LBP 7/26/2012    Chronic pain     CKD (chronic kidney disease), stage IV     COPD (chronic obstructive pulmonary disease)     Dehydration     Encounter for blood transfusion     HTN (hypertension)     Lumbar spondylosis     Melanoma     of the lip    Metabolic bone disease     Migraines, neuralgic     Osteoporosis     Primary osteoarthritis of both knees      s/p Rt TKA    Pulmonary embolism with infarction     Seizures     Subdeltoid bursitis, L>R. 9/27/2012    Ulcer     Vitamin D deficiency disease        Past Surgical History:   Procedure Laterality Date    BLADDER SUSPENSION      CATARACT EXTRACTION  11/18/13    left eye    CERVICAL LAMINECTOMY      x3, fusion x1    COLONOSCOPY  2009    HYSTERECTOMY      JOINT REPLACEMENT  2001    total right knee     LUMBAR LAMINECTOMY      x 3, fusion x1    OOPHORECTOMY      PLACEMENT OF ARTERIOVENOUS GRAFT Left 1/21/2020    Procedure: INSERTION, GRAFT, ARTERIOVENOUS;  Surgeon: Lindsey Louie MD;  [facility];  Service: General;  Laterality: Left;       Prior to Admission medications    Medication Sig Start Date End Date Taking? Authorizing Provider   albuterol (PROVENTIL) 2.5 mg /3 mL (0.083 %) nebulizer solution Take 3 mLs (2.5 mg total) by nebulization every 6 (six) hours as needed for Wheezing. Rescue 11/8/19 11/7/20  Kingston Verduzco MD   albuterol-ipratropium (DUO-NEB) 2.5 mg-0.5 mg/3 mL nebulizer solution Take 3 mLs by nebulization every 6 (six) hours as needed for Shortness of Breath. Rescue 12/18/19 12/17/20  Giancarlo Rust MD   allopurinol (ZYLOPRIM) 100 MG tablet Take 1 tablet (100 mg total) by mouth once daily. Take 100 mg -on Monday and Friday only 10/28/19   Aura Diggs MD   amLODIPine (NORVASC) 10 MG tablet Take 10 mg by mouth once daily.    Historical Provider, MD   baclofen (LIORESAL) 10 MG tablet TAKE 1 TABLET (10 MG TOTAL) BY MOUTH 3 (THREE) TIMES DAILY AS NEEDED (MUSCLE SPASMS). 11/26/19 1/14/20  Pushpa Mcmahon MD   busPIRone (BUSPAR) 5 MG Tab Take 5 mg by mouth 3 (three) times daily.  7/23/19   Historical Provider, MD   calcium-vitamin D3 (CALCIUM 500 + D) 500 mg(1,250mg) -200 unit per tablet Take 1 tablet by mouth 2 (two) times daily with meals.    Historical Provider, MD   cyproheptadine (PERIACTIN) 4 mg tablet Take 1 tablet (4 mg total) by mouth every 8 (eight)  hours. 1/30/20   Lindsey Louie MD   diazePAM (VALIUM) 2 MG tablet Take 2 mg by mouth once daily.  7/2/19   Historical Provider, MD   DULoxetine (CYMBALTA) 30 MG capsule TAKE 1 CAPSULE BY MOUTH EVERY DAY  Patient not taking: No sig reported 12/28/19   Pushpa Mcmahon MD   fluticasone-umeclidin-vilanter (TRELEGY ELLIPTA) 100-62.5-25 mcg DsDv Inhale 1 puff by mouth into the lungs once daily. 1/14/20   Clemencia Gambino MD   furosemide (LASIX) 20 MG tablet Take 1 tablet (20 mg total) by mouth 2 (two) times daily. 1/14/20 1/13/21  Clemencia Gambino MD   HYDROcodone-acetaminophen (NORCO)  mg per tablet Take 1 tablet by mouth 3 (three) times daily as needed. 1/25/20 2/24/20  Pushpa Mcmahon MD   HYDROcodone-acetaminophen (NORCO) 5-325 mg per tablet Take 1 tablet by mouth every 6 (six) hours as needed for Pain. 1/21/20   Lindsey Louie MD   ondansetron (ZOFRAN) 4 MG tablet Take 1 tablet (4 mg total) by mouth 3 (three) times daily. 1/30/20   Lindsey Louie MD   ondansetron (ZOFRAN-ODT) 4 MG TbDL Dissolve one tablet under the tongue every 6 (six) hours as needed. 9/16/19   Jere Mercado NP   sodium bicarbonate 650 MG tablet Take 1 tablet (650 mg total) by mouth 2 (two) times daily. 7/29/19 7/28/20  Aura Diggs MD   zolpidem (AMBIEN) 5 MG Tab Take 5 mg by mouth every evening.  6/4/19   Historical Provider, MD     No current facility-administered medications on file prior to encounter.      Current Outpatient Medications on File Prior to Encounter   Medication Sig Dispense Refill    albuterol (PROVENTIL) 2.5 mg /3 mL (0.083 %) nebulizer solution Take 3 mLs (2.5 mg total) by nebulization every 6 (six) hours as needed for Wheezing. Rescue 100 each 3    albuterol-ipratropium (DUO-NEB) 2.5 mg-0.5 mg/3 mL nebulizer solution Take 3 mLs by nebulization every 6 (six) hours as needed for Shortness of Breath. Rescue 3 Box 5    allopurinol (ZYLOPRIM) 100 MG tablet Take 1 tablet  (100 mg total) by mouth once daily. Take 100 mg -on Monday and Friday only 30 tablet 1    amLODIPine (NORVASC) 10 MG tablet Take 10 mg by mouth once daily.      baclofen (LIORESAL) 10 MG tablet TAKE 1 TABLET (10 MG TOTAL) BY MOUTH 3 (THREE) TIMES DAILY AS NEEDED (MUSCLE SPASMS). 90 tablet 1    busPIRone (BUSPAR) 5 MG Tab Take 5 mg by mouth 3 (three) times daily.       calcium-vitamin D3 (CALCIUM 500 + D) 500 mg(1,250mg) -200 unit per tablet Take 1 tablet by mouth 2 (two) times daily with meals.      diazePAM (VALIUM) 2 MG tablet Take 2 mg by mouth once daily.       DULoxetine (CYMBALTA) 30 MG capsule TAKE 1 CAPSULE BY MOUTH EVERY DAY (Patient not taking: No sig reported) 90 capsule 1    fluticasone-umeclidin-vilanter (TRELEGY ELLIPTA) 100-62.5-25 mcg DsDv Inhale 1 puff by mouth into the lungs once daily. 90 each 3    furosemide (LASIX) 20 MG tablet Take 1 tablet (20 mg total) by mouth 2 (two) times daily. 60 tablet 11    HYDROcodone-acetaminophen (NORCO)  mg per tablet Take 1 tablet by mouth 3 (three) times daily as needed. 90 tablet 0    HYDROcodone-acetaminophen (NORCO) 5-325 mg per tablet Take 1 tablet by mouth every 6 (six) hours as needed for Pain. 20 tablet 0    ondansetron (ZOFRAN-ODT) 4 MG TbDL Dissolve one tablet under the tongue every 6 (six) hours as needed. 30 tablet 3    sodium bicarbonate 650 MG tablet Take 1 tablet (650 mg total) by mouth 2 (two) times daily. 120 tablet 11    zolpidem (AMBIEN) 5 MG Tab Take 5 mg by mouth every evening.   5        Review of patient's allergies indicates:   Allergen Reactions    Aspirin      Other reaction(s): hx of ulcers    Tetracycline Swelling     Other reaction(s): Swelling    Penicillins Rash     Other reaction(s): Hives  Other reaction(s): Rash  Other reaction(s): Rash  Other reaction(s): Hives       Social History:   reports that she has quit smoking. Her smoking use included cigarettes. She has never used smokeless tobacco. She reports  that she drinks about 1.0 standard drinks of alcohol per week. She reports that she does not use drugs.     Family History   Problem Relation Age of Onset    Arthritis Mother     Stroke Mother     Hypertension Father     Cancer Father     Cataracts Father     Diabetes Maternal Aunt     Hypertension Maternal Grandfather     Heart disease Maternal Grandfather     Heart attack Maternal Grandfather     Cataracts Sister     Glaucoma Cousin        PHYSICAL EXAMINATION       General Condition:   alert x3 on nasal oxygen    Head & Neck  Anemia: None  Jaundice: None  Neck vein: Not distended  Carotid Bruits: none  Lymph nodes: none palpable  Thyroid: normal    Chest: normal    Heart: normal    Rt. Breast: not examined  Lt. Breast: not examined  Axillary lymph nodes: none    Abdomen: Soft,  None tender with no palpable mass or organ  Hernia: none    Rectal: Defered    Extremities: clotted access left upper arm    Vascular: normal    Specific focus Examination    Imp: clotted graft left arm, crf, htn, dm    Plan: declotting today

## 2020-02-03 NOTE — DISCHARGE INSTRUCTIONS
Arteriovenous (AV) Fistula for Dialysis  An AV fistula is a connection between an artery and a vein. For this procedure, an AV fistula is surgically created using an artery and a vein in your arm. (Your healthcare provider will let you know if another site is to be used.) When the artery and vein are joined, blood flow increases from the artery into the vein. As a result, the vein gets bigger over time. The enlarged vein provides easier access to the blood for a treatment for kidney failure (dialysis). This sheet explains the procedure and what to expect.     An AV fistula increases blood flow from the artery into the vein. Over time, the vein becomes stronger and enlarged.   Preparing for the procedure  Prepare as you have been told. In addition:  · Tell your healthcare provider about all the medicines you take. This includes all over-the-counter and prescription medicines, and street drugs. It also includes herbs, vitamins, and other supplements. You may need to stop taking some or all of them before the procedure.  · Follow any directions youre given for not eating or drinking before the procedure.  · Do not allow anyone to draw blood from or take blood pressure on the arm that will have the fistula before the procedure.  The day of the procedure  The procedure takes about 1 to 2 hours. Youll likely go home the same day.  Before the procedure begins:  · An IV (intravenous) line is put into a vein in the arm or hand not being used for the procedure. This line supplies fluids and medicines.  · To keep you free of pain during the procedure, youre given general anesthesia. This medicine puts you into a state like a deep sleep through the procedure. Or a nerve block may be used. This medicine numbs the arm. With it, you may also be given medicine that makes you relaxed and drowsy through the procedure.  During the procedure:  · The skin over your arm may be injected with numbing medicine.  · One or more small  cuts (incisions) are then made through the numbed skin. This depends on the size of your arm and the depth of the vein in your arm.  · The vein is attached to the selected artery.  · Any incisions made are then closed with stitches (sutures), staples, surgical glue, or strips of surgical tape.  After the procedure:  · Youll be asked to keep your arm raised (elevated) as often as possible for at least a week after the procedure.  · Youll be given medicines to manage pain as needed.  · Your arm and hand will be checked to make sure blood is flowing through the fistula properly. The feeling of blood rushing through the fistula is called a thrill. It is somewhat similar to the purring of a cat. Youll be taught how to check for this feeling each day to make sure there are no problems with your fistula. Youll also be taught how to care for your fistula at home.  · When its time for you to leave the hospital, have an adult family member or friend ready to drive you home.  Recovering at home  Once at home, follow all of the instructions youve been given. Be sure to:  · Take all medicines as directed.  · Care for your incision as instructed.  · Check for signs of infection at the incision site (see below).  · Avoid heavy lifting and strenuous activities as directed.  · Monitor and care for your fistula as instructed.  · Do your hand and arm exercises as instructed. This usually involves squeezing a ball in your hand for a few minutes each hour.  Call your healthcare provider if you have any of the following:  · Fever of 100.4°F (38°C) or higher  · Signs of infection at the incision site, such as increased redness or swelling, warmth, worsening pain, bleeding, or bad-smelling drainage  · You cant feel a thrill (the vibration of blood going through your arm)  · Pain or numbness in your fingers, hand, or arm  · Bleeding, redness, or warmth around your fistula  · Sudden bulging of the fistula (more than usual; a slight  bulge is normal)   Follow-Up  Your healthcare provider will check your fistula within 1 to 2 weeks after the procedure. It will likely take about 6 to 8 weeks for the fistula to enlarge enough to start dialysis. After that, make sure the fistula is checked each time you have dialysis. Your healthcare provider may also suggest checkups every 6 months.  Risks and possible complications include:  · The fistula not working properly  · Long wait before the fistula is ready (up to 6 months)  · Coldness or numbness in the hand (due to blood flowing away from the hand and into the fistula)  · An unsightly bump under the skin (due to enlargement of the fistula)  · Prolonged bleeding from the fistula after dialysis  · Narrowing or weakening of the blood vessels used for the fistula  · Formation of blood clots in the blood vessels used for the fistula  · Risks of anesthesia or any other medicines used during the procedure   Living with an AV Fistula  A problem, such as a narrowing (stricture) of the vein or an infection, can make the fistula unusable. If this happens, you may need other treatments to repair or make a new fistula. To protect your fistula, follow these and any other guidelines youre given:  · Check your fistula as often as your healthcare provider says. If you cant feel your thrill, let your provider know right away.  · Make sure your fistula is checked before each dialysis treatment.  · Dont let anyone draw blood from or take blood pressure on the arm that has the fistula.  · Wash your hands often and keep the area around your fistula clean.  · Dont sleep on the arm that has the fistula.  · Dont wear tight jewelry or a watch on the arm with your fistula.  · Protect your fistula from cuts, scrapes, or blows.  Date Last Reviewed: 1/1/2017  © 3446-8289 [employer]. [address] [place] [address], [place] [address]. All rights reserved. This information is not intended as a substitute for professional  medical care. Always follow your healthcare professional's instructions.  ANESTHESIA  -For the first 24 hours after surgery:  Do not drive, use heavy equipment, make important decisions, or drink alcohol  -It is normal to feel sleepy for several hours.  Rest until you are more awake.  -Have someone stay with you, if needed.  They can watch for problems and help keep you safe.  -Some possible post anesthesia side effects include: nausea and vomiting, sore throat and hoarseness, sleepiness, and dizziness.    PAIN  -If you have pain after surgery, pain medicine will help you feel better.  Take it as directed, before pain becomes severe.  Most pain relievers taken by mouth need at least 20-30 minutes to start working.  -Do not drive or drink alcohol while taking pain medicine.  -Pain medication can upset your stomach.  Taking them with a little food may help.  -Other ways to help control pain: elevation, ice, and relaxation  -Call your surgeon if still having unmanageable pain an hour after taking pain medicine.  -Pain medicine can cause constipation.  Taking an over-the counter stool softener while on prescription pain medicine and drinking plenty of fluids can prevent this side effect.  -Call your surgeon if you have severe side effects like: breathing problems, trouble waking up, dizziness, confusion, or severe constipation.    NAUSEA  -Some people have nausea after surgery.  This is often because of anesthesia, pain, pain medicine, or the stress of surgery.  -Do not push yourself to eat.  Start off with clear liquids and soup.  Slowly move to solid foods.  Don't eat fatty, rich, spicy foods at first.  Eat smaller amounts.  -If you develop persistent nausea and vomiting please notify your surgeon immediately.    BLEEDING  -Different types of surgery require different types of care and dressing changes.  It is important to follow all instructions and advice from your surgeon.  Change dressing as directed.  Call your  surgeon for any concerns regarding postop bleeding.    SIGNS OF INFECTION  -Signs of infection include: fever, swelling, drainage, and redness  -Notify your surgeon if you have a fever of 100.4 F (38.0 C) or higher.  -Notify your surgeon if you notice redness, swelling, increased pain, pus, or a foul smell at the incision site.    Acetaminophen; Hydrocodone tablets or capsules  What is this medicine?  ACETAMINOPHEN; HYDROCODONE (a set a SARAH jeff fen; dyana droe KOE done) is a pain reliever. It is used to treat moderate to severe pain.  How should I use this medicine?  Take this medicine by mouth with a glass of water. Follow the directions on the prescription label. You can take it with or without food. If it upsets your stomach, take it with food. Do not take your medicine more often than directed.  A special MedGuide will be given to you by the pharmacist with each prescription and refill. Be sure to read this information carefully each time.  Talk to your pediatrician regarding the use of this medicine in children. Special care may be needed.  What side effects may I notice from receiving this medicine?  Side effects that you should report to your doctor or health care professional as soon as possible:  · allergic reactions like skin rash, itching or hives, swelling of the face, lips, or tongue  · breathing problems  · confusion  · redness, blistering, peeling or loosening of the skin, including inside the mouth  · signs and symptoms of low blood pressure like dizziness; feeling faint or lightheaded, falls; unusually weak or tired  · trouble passing urine or change in the amount of urine  · yellowing of the eyes or skin  Side effects that usually do not require medical attention (report to your doctor or health care professional if they continue or are bothersome):  · constipation  · dry mouth  · nausea, vomiting  · tiredness  What may interact with this medicine?  This medicine may interact with the following  medications:  · alcohol  · antiviral medicines for HIV or AIDS  · atropine  · antihistamines for allergy, cough and cold  · certain antibiotics like erythromycin, clarithromycin  · certain medicines for anxiety or sleep  · certain medicines for bladder problems like oxybutynin, tolterodine  · certain medicines for depression like amitriptyline, fluoxetine, sertraline  · certain medicines for fungal infections like ketoconazole and itraconazole  · certain medicines for Parkinson's disease like benztropine, trihexyphenidyl  · certain medicines for seizures like carbamazepine, phenobarbital, phenytoin, primidone  · certain medicines for stomach problems like dicyclomine, hyoscyamine  · certain medicines for travel sickness like scopolamine  · general anesthetics like halothane, isoflurane, methoxyflurane, propofol  · ipratropium  · local anesthetics like lidocaine, pramoxine, tetracaine  · MAOIs like Carbex, Eldepryl, Marplan, Nardil, and Parnate  · medicines that relax muscles for surgery  · other medicines with acetaminophen  · other narcotic medicines for pain or cough  · phenothiazines like chlorpromazine, mesoridazine, prochlorperazine, thioridazine  · rifampin  What if I miss a dose?  If you miss a dose, take it as soon as you can. If it is almost time for your next dose, take only that dose. Do not take double or extra doses.  Where should I keep my medicine?  Keep out of the reach of children. This medicine can be abused. Keep your medicine in a safe place to protect it from theft. Do not share this medicine with anyone. Selling or giving away this medicine is dangerous and against the law.  This medicine may cause accidental overdose and death if it taken by other adults, children, or pets. Mix any unused medicine with a substance like cat litter or coffee grounds. Then throw the medicine away in a sealed container like a sealed bag or a coffee can with a lid. Do not use the medicine after the expiration  date.  Store at room temperature between 15 and 30 degrees C (59 and 86 degrees F).  What should I tell my health care provider before I take this medicine?  They need to know if you have any of these conditions:  · brain tumor  · Crohn's disease, inflammatory bowel disease, or ulcerative colitis  · drug abuse or addiction  · head injury  · heart or circulation problems  · if you often drink alcohol  · kidney disease or problems going to the bathroom  · liver disease  · lung disease, asthma, or breathing problems  · an unusual or allergic reaction to acetaminophen, hydrocodone, other opioid analgesics, other medicines, foods, dyes, or preservatives  · pregnant or trying to get pregnant  · breast-feeding  What should I watch for while using this medicine?  Tell your doctor or health care professional if your pain does not go away, if it gets worse, or if you have new or a different type of pain. You may develop tolerance to the medicine. Tolerance means that you will need a higher dose of the medicine for pain relief. Tolerance is normal and is expected if you take the medicine for a long time.  Do not suddenly stop taking your medicine because you may develop a severe reaction. Your body becomes used to the medicine. This does NOT mean you are addicted. Addiction is a behavior related to getting and using a drug for a non-medical reason. If you have pain, you have a medical reason to take pain medicine. Your doctor will tell you how much medicine to take. If your doctor wants you to stop the medicine, the dose will be slowly lowered over time to avoid any side effects.  There are different types of narcotic medicines (opiates). If you take more than one type at the same time or if you are taking another medicine that also causes drowsiness, you may have more side effects. Give your health care provider a list of all medicines you use. Your doctor will tell you how much medicine to take. Do not take more medicine  than directed. Call emergency for help if you have problems breathing or unusual sleepiness.  Do not take other medicines that contain acetaminophen with this medicine. Always read labels carefully. If you have questions, ask your doctor or pharmacist.  If you take too much acetaminophen get medical help right away. Too much acetaminophen can be very dangerous and cause liver damage. Even if you do not have symptoms, it is important to get help right away.  You may get drowsy or dizzy. Do not drive, use machinery, or do anything that needs mental alertness until you know how this medicine affects you. Do not stand or sit up quickly, especially if you are an older patient. This reduces the risk of dizzy or fainting spells. Alcohol may interfere with the effect of this medicine. Avoid alcoholic drinks.  The medicine will cause constipation. Try to have a bowel movement at least every 2 to 3 days. If you do not have a bowel movement for 3 days, call your doctor or health care professional.  Your mouth may get dry. Chewing sugarless gum or sucking hard candy, and drinking plenty of water may help. Contact your doctor if the problem does not go away or is severe.  NOTE:This sheet is a summary. It may not cover all possible information. If you have questions about this medicine, talk to your doctor, pharmacist, or health care provider. Copyright© 2017 [employer]

## 2020-02-03 NOTE — ANESTHESIA POSTPROCEDURE EVALUATION
Anesthesia Post Evaluation    Patient: Katie Quevedo    Procedure(s) Performed: Procedure(s) (LRB):  THROMBECTOMY (Left)    Final Anesthesia Type: MAC    Patient location during evaluation: OPS  Patient participation: Yes- Able to Participate  Level of consciousness: awake and alert  Post-procedure vital signs: reviewed and stable  Pain management: adequate  Airway patency: patent    PONV status at discharge: No PONV  Anesthetic complications: no      Cardiovascular status: blood pressure returned to baseline and hemodynamically stable  Respiratory status: unassisted, spontaneous ventilation and room air  Hydration status: euvolemic  Follow-up not needed.          Vitals Value Taken Time   /66 2/3/2020 11:45 AM   Temp 36.6 °C (97.9 °F) 2/3/2020 11:45 AM   Pulse 82 2/3/2020 11:45 AM   Resp 20 2/3/2020 11:45 AM   SpO2 96 % 2/3/2020 11:45 AM         No case tracking events are documented in the log.      Pain/Nelson Score: No data recorded

## 2020-02-03 NOTE — BRIEF OP NOTE
Operative Note       Surgery Date: 2/3/2020     Surgeon(s) and Role:     * Lindsey Louie MD - Primary    Pre-op Diagnosis:  Acute kidney injury superimposed on CKD [N17.9, N18.9]  CKD (chronic kidney disease) stage 4, GFR 15-29 ml/min [N18.4]  Essential hypertension [I10]  Thrombosis of kidney dialysis arteriovenous graft, initial encounter [T82.868A]    Post-op Diagnosis: Post-Op Diagnosis Codes:     * Acute kidney injury superimposed on CKD [N17.9, N18.9]     * CKD (chronic kidney disease) stage 4, GFR 15-29 ml/min [N18.4]     * Essential hypertension [I10]     * Thrombosis of kidney dialysis arteriovenous graft, initial encounter [T82.868A]    Procedure(s) (LRB):  THROMBECTOMY (Left)    Anesthesia: Local MAC    Procedure in Detail/Findings:  Operative Note       Surgery Date: 2/3/2020     Surgeon(s) and Role:     * Lindsey Louie MD - Primary    Pre-op Diagnosis:  Acute kidney injury superimposed on CKD [N17.9, N18.9]  CKD (chronic kidney disease) stage 4, GFR 15-29 ml/min [N18.4]  Essential hypertension [I10]  Thrombosis of kidney dialysis arteriovenous graft, initial encounter [T82.868A]    Post-op Diagnosis: Post-Op Diagnosis Codes:     * Acute kidney injury superimposed on CKD [N17.9, N18.9]     * CKD (chronic kidney disease) stage 4, GFR 15-29 ml/min [N18.4]     * Essential hypertension [I10]     * Thrombosis of kidney dialysis arteriovenous graft, initial encounter [T82.868A]    Procedure(s) (LRB):  THROMBECTOMY (Left)    Anesthesia: Local MAC    Procedure in Detail/Findings:    After satisfactory IV sedation, the patient in supine   position, left upper arm and forearm was prepped and draped in normal sterile   manner using ChloraPrep.  A stockinette was applied.  A timeout was called and   extremity was confirmed.  The area at the takeoff of the AV fistula, left upper   arm was infiltrated using 1% Xylocaine solution.  Incision was carried down to   the deep subcutaneous tissues.   Subcutaneous bleeders clamped and bovied and   further taken down.  Short segment of graft was isolated.  Proximal and distal   control was obtained.  Graft incision was made.  Crystal catheter first induced   on the venous side.  Good backward flow was obtained.  The patient was   heparinized using 3000 units of heparin.  Crystal catheter was then introduced   on the arterial side and excellent forward flow was obtained.  The graft   incision was closed using running 5-0 Prolene suture.  The patient had good   bruit after completion of procedure.  Hemostasis satisfactorily maintained.    Wound was irrigated with antibiotic solution and then approximated using 3-0   Vicryl for subcutaneous tissue.  The skin was closed using running 4-0 nylon   suture.  Sterile gauze dressing was applied.  Instrument count, sponge count and   needle count was correct.  The patient tolerated it well.  Estimated blood loss   was 50 mL.  Specimen removed was thrombus.  No intraoperative complications.    Intraoperative finding is clotted left upper arm access.  The patient was sent   to the Recovery Room in stable condition.          Estimated Blood Loss: 50 cc  Specimens (From admission, onward)    None        Implants: * No implants in log *           Disposition: PACU - hemodynamically stable.           Condition: Good    Attestation:  I performed the procedure.           Discharge Note    Admit Date: 2/3/2020    Attending Physician: Lindsey Louie MD     Discharge Physician: Lindsey Louie MD    Final Diagnosis: Post-Op Diagnosis Codes:     * Acute kidney injury superimposed on CKD [N17.9, N18.9]     * CKD (chronic kidney disease) stage 4, GFR 15-29 ml/min [N18.4]     * Essential hypertension [I10]     * Thrombosis of kidney dialysis arteriovenous graft, initial encounter [T82.868A]    Disposition: Home or Self Care    Patient Instructions:   Current Discharge Medication List      START taking these medications     Details   HYDROcodone-acetaminophen (NORCO) 5-325 mg per tablet Take 1 tablet by mouth every 6 (six) hours as needed for Pain.  Qty: 10 tablet, Refills: 0    Comments: Quantity prescribed more than 7 day supply? Yes, quantity medically necessary         CONTINUE these medications which have NOT CHANGED    Details   albuterol (PROVENTIL) 2.5 mg /3 mL (0.083 %) nebulizer solution Take 3 mLs (2.5 mg total) by nebulization every 6 (six) hours as needed for Wheezing. Rescue  Qty: 100 each, Refills: 3      amLODIPine (NORVASC) 10 MG tablet Take 10 mg by mouth once daily.      busPIRone (BUSPAR) 5 MG Tab Take 5 mg by mouth 3 (three) times daily.       diazePAM (VALIUM) 2 MG tablet Take 2 mg by mouth once daily.       diphenhydrAMINE (BENADRYL) 25 mg capsule Take 25 mg by mouth every 6 (six) hours as needed for Itching.      fluticasone-umeclidin-vilanter (TRELEGY ELLIPTA) 100-62.5-25 mcg DsDv Inhale 1 puff by mouth into the lungs once daily.  Qty: 90 each, Refills: 3    Associated Diagnoses: Chronic obstructive pulmonary disease, unspecified COPD type      furosemide (LASIX) 20 MG tablet Take 1 tablet (20 mg total) by mouth 2 (two) times daily.  Qty: 60 tablet, Refills: 11      HYDROcodone-acetaminophen (NORCO)  mg per tablet Take 1 tablet by mouth 3 (three) times daily as needed.  Qty: 90 tablet, Refills: 0    Comments: Medically necessary for more than 7 days due to chronic pain.  Associated Diagnoses: Chronic midline low back pain with right-sided sciatica; Chronic neck pain; Primary osteoarthritis of both hips      ondansetron (ZOFRAN-ODT) 4 MG TbDL Dissolve one tablet under the tongue every 6 (six) hours as needed.  Qty: 30 tablet, Refills: 3      sodium bicarbonate 650 MG tablet Take 1 tablet (650 mg total) by mouth 2 (two) times daily.  Qty: 120 tablet, Refills: 11      traZODone (DESYREL) 100 MG tablet Take 100 mg by mouth nightly as needed for Insomnia.      zolpidem (AMBIEN) 5 MG Tab Take 5 mg by mouth  every evening.   Refills: 5      albuterol-ipratropium (DUO-NEB) 2.5 mg-0.5 mg/3 mL nebulizer solution Take 3 mLs by nebulization every 6 (six) hours as needed for Shortness of Breath. Rescue  Qty: 3 Box, Refills: 5      allopurinol (ZYLOPRIM) 100 MG tablet Take 1 tablet (100 mg total) by mouth once daily. Take 100 mg -on Monday and Friday only  Qty: 30 tablet, Refills: 1      cyproheptadine (PERIACTIN) 4 mg tablet Take 1 tablet (4 mg total) by mouth every 8 (eight) hours.  Qty: 24 tablet, Refills: 0             Discharge Procedure Orders (renal diet , no heavy lifting , keep dressing dry rtc office one week   Discharge Procedure Orders (must include Diet, Follow-up, Activity)   Diet general     Keep surgical extremity elevated     Lifting restrictions     Call MD for:  temperature >100.4     Call MD for:  persistent nausea and vomiting     Call MD for:  severe uncontrolled pain     Call MD for:  redness, tenderness, or signs of infection (pain, swelling, redness, odor or green/yellow discharge around incision site)     Leave dressing on - Keep it clean, dry, and intact until clinic visit        Discharge Date: 2/3/2019    Estimated Blood Loss: * No values recorded between 2/3/2020  1:12 PM and 2/3/2020  1:44 PM *           Specimens (From admission, onward)    None        Implants: * No implants in log *           Disposition: PACU - hemodynamically stable.           Condition: Good    Attestation:  I performed the procedure.           Discharge Note    Admit Date: 2/3/2020    Attending Physician: Lindsey Louie MD     Discharge Physician: Lindsey Louie MD    Final Diagnosis: Post-Op Diagnosis Codes:     * Acute kidney injury superimposed on CKD [N17.9, N18.9]     * CKD (chronic kidney disease) stage 4, GFR 15-29 ml/min [N18.4]     * Essential hypertension [I10]     * Thrombosis of kidney dialysis arteriovenous graft, initial encounter [T82.868A]    Disposition: Home or Self Care    Patient  Instructions:   Current Discharge Medication List      START taking these medications    Details   HYDROcodone-acetaminophen (NORCO) 5-325 mg per tablet Take 1 tablet by mouth every 6 (six) hours as needed for Pain.  Qty: 10 tablet, Refills: 0    Comments: Quantity prescribed more than 7 day supply? Yes, quantity medically necessary         CONTINUE these medications which have NOT CHANGED    Details   albuterol (PROVENTIL) 2.5 mg /3 mL (0.083 %) nebulizer solution Take 3 mLs (2.5 mg total) by nebulization every 6 (six) hours as needed for Wheezing. Rescue  Qty: 100 each, Refills: 3      amLODIPine (NORVASC) 10 MG tablet Take 10 mg by mouth once daily.      busPIRone (BUSPAR) 5 MG Tab Take 5 mg by mouth 3 (three) times daily.       diazePAM (VALIUM) 2 MG tablet Take 2 mg by mouth once daily.       diphenhydrAMINE (BENADRYL) 25 mg capsule Take 25 mg by mouth every 6 (six) hours as needed for Itching.      fluticasone-umeclidin-vilanter (TRELEGY ELLIPTA) 100-62.5-25 mcg DsDv Inhale 1 puff by mouth into the lungs once daily.  Qty: 90 each, Refills: 3    Associated Diagnoses: Chronic obstructive pulmonary disease, unspecified COPD type      furosemide (LASIX) 20 MG tablet Take 1 tablet (20 mg total) by mouth 2 (two) times daily.  Qty: 60 tablet, Refills: 11      HYDROcodone-acetaminophen (NORCO)  mg per tablet Take 1 tablet by mouth 3 (three) times daily as needed.  Qty: 90 tablet, Refills: 0    Comments: Medically necessary for more than 7 days due to chronic pain.  Associated Diagnoses: Chronic midline low back pain with right-sided sciatica; Chronic neck pain; Primary osteoarthritis of both hips      ondansetron (ZOFRAN-ODT) 4 MG TbDL Dissolve one tablet under the tongue every 6 (six) hours as needed.  Qty: 30 tablet, Refills: 3      sodium bicarbonate 650 MG tablet Take 1 tablet (650 mg total) by mouth 2 (two) times daily.  Qty: 120 tablet, Refills: 11      traZODone (DESYREL) 100 MG tablet Take 100 mg by  mouth nightly as needed for Insomnia.      zolpidem (AMBIEN) 5 MG Tab Take 5 mg by mouth every evening.   Refills: 5      albuterol-ipratropium (DUO-NEB) 2.5 mg-0.5 mg/3 mL nebulizer solution Take 3 mLs by nebulization every 6 (six) hours as needed for Shortness of Breath. Rescue  Qty: 3 Box, Refills: 5      allopurinol (ZYLOPRIM) 100 MG tablet Take 1 tablet (100 mg total) by mouth once daily. Take 100 mg -on Monday and Friday only  Qty: 30 tablet, Refills: 1      cyproheptadine (PERIACTIN) 4 mg tablet Take 1 tablet (4 mg total) by mouth every 8 (eight) hours.  Qty: 24 tablet, Refills: 0             Discharge Procedure Orders (renal diet ,  o heavy lifting , keep dressing dry rtc office one week.   Discharge Procedure Orders (must include Diet, Follow-up, Activity)   Diet general     Keep surgical extremity elevated     Lifting restrictions     Call MD for:  temperature >100.4     Call MD for:  persistent nausea and vomiting     Call MD for:  severe uncontrolled pain     Call MD for:  redness, tenderness, or signs of infection (pain, swelling, redness, odor or green/yellow discharge around incision site)     Leave dressing on - Keep it clean, dry, and intact until clinic visit        Discharge Date: 2/3/2020

## 2020-02-03 NOTE — ANESTHESIA PREPROCEDURE EVALUATION
02/03/2020  Katie Quevedo is a 74 y.o., female presents for thrombectomy of AV fistula graft.    Past Medical History:   Diagnosis Date    Anemia     Asthma     Bilateral renal cysts     Cataract     Chronic LBP 7/26/2012    Chronic pain     CKD (chronic kidney disease), stage IV     COPD (chronic obstructive pulmonary disease)     Dehydration     Encounter for blood transfusion     HTN (hypertension)     Lumbar spondylosis     Melanoma     of the lip    Metabolic bone disease     Migraines, neuralgic     Osteoporosis     Primary osteoarthritis of both knees     s/p Rt TKA    Pulmonary embolism with infarction     Seizures     Subdeltoid bursitis, L>R. 9/27/2012    Ulcer     Vitamin D deficiency disease      Past Surgical History:   Procedure Laterality Date    BLADDER SUSPENSION      CATARACT EXTRACTION  11/18/13    left eye    CERVICAL LAMINECTOMY      x3, fusion x1    COLONOSCOPY  2009    HYSTERECTOMY      JOINT REPLACEMENT  2001    total right knee     LUMBAR LAMINECTOMY      x 3, fusion x1    OOPHORECTOMY      PLACEMENT OF ARTERIOVENOUS GRAFT Left 1/21/2020    Procedure: INSERTION, GRAFT, ARTERIOVENOUS;  Surgeon: Lindsey Louie MD;  Location: [facility];  Service: General;  Laterality: Left;             Pre-op Assessment    I have reviewed the Patient Summary Reports.     I have reviewed the Nursing Notes.   I have reviewed the Medications.     Review of Systems  Anesthesia Hx:  No problems with previous Anesthesia    Cardiovascular:   Hypertension    Pulmonary:   COPD Asthma    Renal/:   Chronic Renal Disease    Neurological:   Neuromuscular Disease, Headaches Seizures    Endocrine:  Endocrine Normal        Physical Exam  General:  Well nourished    Airway/Jaw/Neck:  Airway Findings: Mouth Opening: Normal Tongue: Normal  General Airway Assessment: Adult  Mallampati: II   TM Distance: Normal, at least 6 cm      Dental:  Dental Findings:   Chest/Lungs:  Chest/Lungs Findings: Clear to auscultation, Normal Respiratory Rate     Heart/Vascular:  Heart Findings: Rate: Normal  Rhythm: Regular Rhythm  Sounds: Normal        Mental Status:  Mental Status Findings:  Cooperative, Alert and Oriented         Anesthesia Plan  Type of Anesthesia, risks & benefits discussed:  Anesthesia Type:  MAC, regional  Patient's Preference:   Intra-op Monitoring Plan:   Intra-op Monitoring Plan Comments:   Post Op Pain Control Plan: multimodal analgesia  Post Op Pain Control Plan Comments:   Induction:   IV  Beta Blocker:         Informed Consent: Patient understands risks and agrees with Anesthesia plan.  Questions answered. Anesthesia consent signed with patient.  ASA Score: 3     Day of Surgery Review of History & Physical:  There are no significant changes.          Ready For Surgery From Anesthesia Perspective.

## 2020-02-03 NOTE — TELEPHONE ENCOUNTER
Pt is scheduled for thrombectomy surgery this am, needs to be there for 1100.  Woke up around 2:30 this am she woke up and accidentally drank the remainder of a hot toddy that was sitting on her bedside table, estimates it was about 5 oz.  Rum and almond milk.  Call placed to Dr. Louie's office, s/w Mariana (bonita) she will reach out to anesthesia and give me a call back.    Reason for Disposition   [1] Follow-up call from patient regarding patient's clinical status AND [2] information urgent    Additional Information   Negative: [1] Caller is not with the adult (patient) AND [2] reporting urgent symptoms   Negative: Lab result questions   Negative: Medication questions   Negative: Caller can't be reached by phone   Negative: Caller has already spoken to PCP or another triager   Negative: RN needs further essential information from caller in order to complete triage   Negative: Lab calling with strep throat test results and triager can call in prescription   Negative: Lab calling with urinalysis test results and triager can call in prescription   Negative: Medication questions   Negative: [1] Prescription not at pharmacy AND [2] was prescribed today by PCP   Negative: Lab or radiology calling with CRITICAL test results   Negative: Call about patient who is currently hospitalized   Negative: Physician call to PCP   Negative: ED call to PCP    Protocols used: PCP CALL - NO TRIAGE-A-, INFORMATION ONLY CALL-A-AH

## 2020-02-04 PROBLEM — I51.9 DIASTOLIC DYSFUNCTION, LEFT VENTRICLE: Status: ACTIVE | Noted: 2020-02-04

## 2020-02-10 ENCOUNTER — HOSPITAL ENCOUNTER (OUTPATIENT)
Facility: HOSPITAL | Age: 75
Discharge: HOME | End: 2020-02-14
Attending: EMERGENCY MEDICINE | Admitting: FAMILY MEDICINE
Payer: MEDICARE

## 2020-02-10 DIAGNOSIS — J96.21 ACUTE ON CHRONIC RESPIRATORY FAILURE WITH HYPOXIA: ICD-10-CM

## 2020-02-10 DIAGNOSIS — N18.4 ANEMIA, CHRONIC RENAL FAILURE, STAGE 4 (SEVERE): ICD-10-CM

## 2020-02-10 DIAGNOSIS — N17.9 ACUTE KIDNEY INJURY SUPERIMPOSED ON CKD: ICD-10-CM

## 2020-02-10 DIAGNOSIS — N17.9 ACUTE KIDNEY INJURY: ICD-10-CM

## 2020-02-10 DIAGNOSIS — E87.1 HYPONATREMIA: ICD-10-CM

## 2020-02-10 DIAGNOSIS — N18.4 CKD (CHRONIC KIDNEY DISEASE) STAGE 4, GFR 15-29 ML/MIN: ICD-10-CM

## 2020-02-10 DIAGNOSIS — N18.9 ACUTE KIDNEY INJURY SUPERIMPOSED ON CKD: ICD-10-CM

## 2020-02-10 DIAGNOSIS — R06.02 SHORTNESS OF BREATH: ICD-10-CM

## 2020-02-10 DIAGNOSIS — D63.1 ANEMIA, CHRONIC RENAL FAILURE, STAGE 4 (SEVERE): ICD-10-CM

## 2020-02-10 DIAGNOSIS — R07.9 CHEST PAIN: ICD-10-CM

## 2020-02-10 DIAGNOSIS — J18.9 PNEUMONIA OF LEFT LOWER LOBE DUE TO INFECTIOUS ORGANISM: ICD-10-CM

## 2020-02-10 DIAGNOSIS — N18.6 ESRD (END STAGE RENAL DISEASE): ICD-10-CM

## 2020-02-10 DIAGNOSIS — N18.4 CRF (CHRONIC RENAL FAILURE), STAGE 4 (SEVERE): ICD-10-CM

## 2020-02-10 DIAGNOSIS — I50.9 ACUTE CONGESTIVE HEART FAILURE, UNSPECIFIED HEART FAILURE TYPE: Primary | ICD-10-CM

## 2020-02-10 LAB
ALBUMIN SERPL BCP-MCNC: 3.9 G/DL (ref 3.5–5.2)
ALLENS TEST: ABNORMAL
ALP SERPL-CCNC: 160 U/L (ref 55–135)
ALT SERPL W/O P-5'-P-CCNC: 10 U/L (ref 10–44)
ANION GAP SERPL CALC-SCNC: 15 MMOL/L (ref 8–16)
AST SERPL-CCNC: 23 U/L (ref 10–40)
BASOPHILS # BLD AUTO: 0.08 K/UL (ref 0–0.2)
BASOPHILS NFR BLD: 0.9 % (ref 0–1.9)
BILIRUB SERPL-MCNC: 0.5 MG/DL (ref 0.1–1)
BNP SERPL-MCNC: 498 PG/ML (ref 0–99)
BUN SERPL-MCNC: 57 MG/DL (ref 8–23)
CALCIUM SERPL-MCNC: 9.3 MG/DL (ref 8.7–10.5)
CHLORIDE SERPL-SCNC: 86 MMOL/L (ref 95–110)
CO2 SERPL-SCNC: 24 MMOL/L (ref 23–29)
CREAT SERPL-MCNC: 4 MG/DL (ref 0.5–1.4)
DELSYS: ABNORMAL
DIFFERENTIAL METHOD: ABNORMAL
EOSINOPHIL # BLD AUTO: 0.2 K/UL (ref 0–0.5)
EOSINOPHIL NFR BLD: 2.4 % (ref 0–8)
ERYTHROCYTE [DISTWIDTH] IN BLOOD BY AUTOMATED COUNT: 15.1 % (ref 11.5–14.5)
EST. GFR  (AFRICAN AMERICAN): 12 ML/MIN/1.73 M^2
EST. GFR  (NON AFRICAN AMERICAN): 10 ML/MIN/1.73 M^2
GLUCOSE SERPL-MCNC: 109 MG/DL (ref 70–110)
HCO3 UR-SCNC: 27.5 MMOL/L (ref 24–28)
HCT VFR BLD AUTO: 29.3 % (ref 37–48.5)
HGB BLD-MCNC: 9.2 G/DL (ref 12–16)
IMM GRANULOCYTES # BLD AUTO: 0.05 K/UL (ref 0–0.04)
IMM GRANULOCYTES NFR BLD AUTO: 0.6 % (ref 0–0.5)
INR PPP: 1 (ref 0.8–1.2)
LYMPHOCYTES # BLD AUTO: 0.8 K/UL (ref 1–4.8)
LYMPHOCYTES NFR BLD: 8.8 % (ref 18–48)
MCH RBC QN AUTO: 28.4 PG (ref 27–31)
MCHC RBC AUTO-ENTMCNC: 31.4 G/DL (ref 32–36)
MCV RBC AUTO: 90 FL (ref 82–98)
MONOCYTES # BLD AUTO: 0.9 K/UL (ref 0.3–1)
MONOCYTES NFR BLD: 9.6 % (ref 4–15)
NEUTROPHILS # BLD AUTO: 7 K/UL (ref 1.8–7.7)
NEUTROPHILS NFR BLD: 77.7 % (ref 38–73)
NRBC BLD-RTO: 0 /100 WBC
PCO2 BLDA: 43.2 MMHG (ref 35–45)
PH SMN: 7.41 [PH] (ref 7.35–7.45)
PLATELET # BLD AUTO: 286 K/UL (ref 150–350)
PMV BLD AUTO: 10.5 FL (ref 9.2–12.9)
PO2 BLDA: 72 MMHG (ref 80–100)
POC BE: 3 MMOL/L
POC SATURATED O2: 94 % (ref 95–100)
POC TCO2: 29 MMOL/L (ref 23–27)
POTASSIUM SERPL-SCNC: 5.2 MMOL/L (ref 3.5–5.1)
PROCALCITONIN SERPL IA-MCNC: 0.31 NG/ML
PROT SERPL-MCNC: 7.9 G/DL (ref 6–8.4)
PROTHROMBIN TIME: 10.7 SEC (ref 9–12.5)
RBC # BLD AUTO: 3.24 M/UL (ref 4–5.4)
SAMPLE: ABNORMAL
SITE: ABNORMAL
SODIUM SERPL-SCNC: 125 MMOL/L (ref 136–145)
TROPONIN I SERPL DL<=0.01 NG/ML-MCNC: 0.02 NG/ML (ref 0–0.03)
WBC # BLD AUTO: 9 K/UL (ref 3.9–12.7)

## 2020-02-10 PROCEDURE — 99900035 HC TECH TIME PER 15 MIN (STAT): Mod: HCNC

## 2020-02-10 PROCEDURE — 96366 THER/PROPH/DIAG IV INF ADDON: CPT

## 2020-02-10 PROCEDURE — 84484 ASSAY OF TROPONIN QUANT: CPT | Mod: HCNC

## 2020-02-10 PROCEDURE — 83880 ASSAY OF NATRIURETIC PEPTIDE: CPT | Mod: HCNC

## 2020-02-10 PROCEDURE — 36600 WITHDRAWAL OF ARTERIAL BLOOD: CPT | Mod: HCNC

## 2020-02-10 PROCEDURE — G0378 HOSPITAL OBSERVATION PER HR: HCPCS | Mod: HCNC

## 2020-02-10 PROCEDURE — 80053 COMPREHEN METABOLIC PANEL: CPT | Mod: HCNC

## 2020-02-10 PROCEDURE — 96365 THER/PROPH/DIAG IV INF INIT: CPT

## 2020-02-10 PROCEDURE — 87040 BLOOD CULTURE FOR BACTERIA: CPT | Mod: HCNC

## 2020-02-10 PROCEDURE — 96375 TX/PRO/DX INJ NEW DRUG ADDON: CPT | Mod: HCNC

## 2020-02-10 PROCEDURE — 63600175 PHARM REV CODE 636 W HCPCS: Mod: HCNC | Performed by: EMERGENCY MEDICINE

## 2020-02-10 PROCEDURE — 93010 EKG 12-LEAD: ICD-10-PCS | Mod: HCNC,,, | Performed by: INTERNAL MEDICINE

## 2020-02-10 PROCEDURE — 99285 EMERGENCY DEPT VISIT HI MDM: CPT | Mod: 25,HCNC

## 2020-02-10 PROCEDURE — 84145 PROCALCITONIN (PCT): CPT | Mod: HCNC

## 2020-02-10 PROCEDURE — 93010 ELECTROCARDIOGRAM REPORT: CPT | Mod: HCNC,,, | Performed by: INTERNAL MEDICINE

## 2020-02-10 PROCEDURE — 85025 COMPLETE CBC W/AUTO DIFF WBC: CPT | Mod: HCNC

## 2020-02-10 PROCEDURE — 93005 ELECTROCARDIOGRAM TRACING: CPT | Mod: HCNC | Performed by: INTERNAL MEDICINE

## 2020-02-10 PROCEDURE — 93005 ELECTROCARDIOGRAM TRACING: CPT | Mod: HCNC

## 2020-02-10 PROCEDURE — 82803 BLOOD GASES ANY COMBINATION: CPT | Mod: HCNC

## 2020-02-10 PROCEDURE — 96368 THER/DIAG CONCURRENT INF: CPT

## 2020-02-10 PROCEDURE — 63700000 PHARM REV CODE 250 ALT 637 W/O HCPCS: Mod: HCNC | Performed by: EMERGENCY MEDICINE

## 2020-02-10 PROCEDURE — 85610 PROTHROMBIN TIME: CPT | Mod: HCNC

## 2020-02-10 RX ORDER — ONDANSETRON 2 MG/ML
4 INJECTION INTRAMUSCULAR; INTRAVENOUS EVERY 8 HOURS PRN
Status: DISCONTINUED | OUTPATIENT
Start: 2020-02-10 | End: 2020-02-14 | Stop reason: HOSPADM

## 2020-02-10 RX ORDER — FUROSEMIDE 10 MG/ML
80 INJECTION INTRAMUSCULAR; INTRAVENOUS
Status: COMPLETED | OUTPATIENT
Start: 2020-02-10 | End: 2020-02-10

## 2020-02-10 RX ORDER — VANCOMYCIN HCL IN 5 % DEXTROSE 1G/250ML
1000 PLASTIC BAG, INJECTION (ML) INTRAVENOUS
Status: COMPLETED | OUTPATIENT
Start: 2020-02-10 | End: 2020-02-11

## 2020-02-10 RX ORDER — SODIUM CHLORIDE 0.9 % (FLUSH) 0.9 %
10 SYRINGE (ML) INJECTION
Status: DISCONTINUED | OUTPATIENT
Start: 2020-02-10 | End: 2020-02-14 | Stop reason: HOSPADM

## 2020-02-10 RX ORDER — CEFEPIME HYDROCHLORIDE 2 G/50ML
2 INJECTION, SOLUTION INTRAVENOUS
Status: COMPLETED | OUTPATIENT
Start: 2020-02-10 | End: 2020-02-10

## 2020-02-10 RX ORDER — NITROGLYCERIN 0.4 MG/1
0.4 TABLET SUBLINGUAL
Status: COMPLETED | OUTPATIENT
Start: 2020-02-10 | End: 2020-02-10

## 2020-02-10 RX ORDER — ACETAMINOPHEN 325 MG/1
650 TABLET ORAL EVERY 4 HOURS PRN
Status: DISCONTINUED | OUTPATIENT
Start: 2020-02-10 | End: 2020-02-14 | Stop reason: HOSPADM

## 2020-02-10 RX ADMIN — NITROGLYCERIN 0.4 MG: 0.4 TABLET, ORALLY DISINTEGRATING SUBLINGUAL at 08:02

## 2020-02-10 RX ADMIN — CEFEPIME HYDROCHLORIDE 2 G: 2 INJECTION, SOLUTION INTRAVENOUS at 10:02

## 2020-02-10 RX ADMIN — FUROSEMIDE 80 MG: 10 INJECTION, SOLUTION INTRAVENOUS at 08:02

## 2020-02-10 RX ADMIN — AZITHROMYCIN MONOHYDRATE 500 MG: 500 INJECTION, POWDER, LYOPHILIZED, FOR SOLUTION INTRAVENOUS at 10:02

## 2020-02-11 PROBLEM — E87.1 HYPONATREMIA: Status: ACTIVE | Noted: 2020-02-11

## 2020-02-11 LAB
ANION GAP SERPL CALC-SCNC: 11 MMOL/L (ref 8–16)
ANION GAP SERPL CALC-SCNC: 11 MMOL/L (ref 8–16)
BASOPHILS # BLD AUTO: 0.05 K/UL (ref 0–0.2)
BASOPHILS NFR BLD: 0.6 % (ref 0–1.9)
BILIRUB UR QL STRIP: NEGATIVE
BUN SERPL-MCNC: 56 MG/DL (ref 8–23)
BUN SERPL-MCNC: 56 MG/DL (ref 8–23)
CALCIUM SERPL-MCNC: 8.9 MG/DL (ref 8.7–10.5)
CALCIUM SERPL-MCNC: 9 MG/DL (ref 8.7–10.5)
CHLORIDE SERPL-SCNC: 86 MMOL/L (ref 95–110)
CHLORIDE SERPL-SCNC: 86 MMOL/L (ref 95–110)
CLARITY UR: CLEAR
CO2 SERPL-SCNC: 26 MMOL/L (ref 23–29)
CO2 SERPL-SCNC: 26 MMOL/L (ref 23–29)
COLOR UR: YELLOW
CREAT SERPL-MCNC: 3.7 MG/DL (ref 0.5–1.4)
CREAT SERPL-MCNC: 3.7 MG/DL (ref 0.5–1.4)
DIFFERENTIAL METHOD: ABNORMAL
EOSINOPHIL # BLD AUTO: 0.4 K/UL (ref 0–0.5)
EOSINOPHIL NFR BLD: 4.8 % (ref 0–8)
ERYTHROCYTE [DISTWIDTH] IN BLOOD BY AUTOMATED COUNT: 14.9 % (ref 11.5–14.5)
EST. GFR  (AFRICAN AMERICAN): 13 ML/MIN/1.73 M^2
EST. GFR  (AFRICAN AMERICAN): 13 ML/MIN/1.73 M^2
EST. GFR  (NON AFRICAN AMERICAN): 11 ML/MIN/1.73 M^2
EST. GFR  (NON AFRICAN AMERICAN): 11 ML/MIN/1.73 M^2
GLUCOSE SERPL-MCNC: 88 MG/DL (ref 70–110)
GLUCOSE SERPL-MCNC: 89 MG/DL (ref 70–110)
GLUCOSE UR QL STRIP: NEGATIVE
HCT VFR BLD AUTO: 25.9 % (ref 37–48.5)
HGB BLD-MCNC: 8.2 G/DL (ref 12–16)
HGB UR QL STRIP: ABNORMAL
IMM GRANULOCYTES # BLD AUTO: 0.04 K/UL (ref 0–0.04)
IMM GRANULOCYTES NFR BLD AUTO: 0.5 % (ref 0–0.5)
KETONES UR QL STRIP: NEGATIVE
LEUKOCYTE ESTERASE UR QL STRIP: ABNORMAL
LYMPHOCYTES # BLD AUTO: 1.4 K/UL (ref 1–4.8)
LYMPHOCYTES NFR BLD: 17.4 % (ref 18–48)
MAGNESIUM SERPL-MCNC: 2.7 MG/DL (ref 1.6–2.6)
MCH RBC QN AUTO: 28.3 PG (ref 27–31)
MCHC RBC AUTO-ENTMCNC: 31.7 G/DL (ref 32–36)
MCV RBC AUTO: 89 FL (ref 82–98)
MICROSCOPIC COMMENT: NORMAL
MONOCYTES # BLD AUTO: 1.2 K/UL (ref 0.3–1)
MONOCYTES NFR BLD: 14.6 % (ref 4–15)
NEUTROPHILS # BLD AUTO: 5 K/UL (ref 1.8–7.7)
NEUTROPHILS NFR BLD: 62.1 % (ref 38–73)
NITRITE UR QL STRIP: NEGATIVE
NRBC BLD-RTO: 0 /100 WBC
PH UR STRIP: 6 [PH] (ref 5–8)
PHOSPHATE SERPL-MCNC: 5.4 MG/DL (ref 2.7–4.5)
PLATELET # BLD AUTO: 248 K/UL (ref 150–350)
PMV BLD AUTO: 10.6 FL (ref 9.2–12.9)
POTASSIUM SERPL-SCNC: 4.9 MMOL/L (ref 3.5–5.1)
POTASSIUM SERPL-SCNC: 4.9 MMOL/L (ref 3.5–5.1)
PROT UR QL STRIP: NEGATIVE
RBC # BLD AUTO: 2.9 M/UL (ref 4–5.4)
RBC #/AREA URNS HPF: 3 /HPF (ref 0–4)
SODIUM SERPL-SCNC: 123 MMOL/L (ref 136–145)
SODIUM SERPL-SCNC: 123 MMOL/L (ref 136–145)
SP GR UR STRIP: 1.01 (ref 1–1.03)
URN SPEC COLLECT METH UR: ABNORMAL
UROBILINOGEN UR STRIP-ACNC: NEGATIVE EU/DL
WBC # BLD AUTO: 7.99 K/UL (ref 3.9–12.7)
WBC #/AREA URNS HPF: 2 /HPF (ref 0–5)

## 2020-02-11 PROCEDURE — 87449 NOS EACH ORGANISM AG IA: CPT | Mod: HCNC

## 2020-02-11 PROCEDURE — 86580 TB INTRADERMAL TEST: CPT | Mod: HCNC | Performed by: INTERNAL MEDICINE

## 2020-02-11 PROCEDURE — 94761 N-INVAS EAR/PLS OXIMETRY MLT: CPT | Mod: HCNC

## 2020-02-11 PROCEDURE — 96367 TX/PROPH/DG ADDL SEQ IV INF: CPT

## 2020-02-11 PROCEDURE — 25000003 PHARM REV CODE 250: Mod: HCNC | Performed by: INTERNAL MEDICINE

## 2020-02-11 PROCEDURE — 25000003 PHARM REV CODE 250: Mod: HCNC | Performed by: NURSE PRACTITIONER

## 2020-02-11 PROCEDURE — 83735 ASSAY OF MAGNESIUM: CPT | Mod: HCNC

## 2020-02-11 PROCEDURE — 63600175 PHARM REV CODE 636 W HCPCS: Mod: HCNC | Performed by: NURSE PRACTITIONER

## 2020-02-11 PROCEDURE — 63600175 PHARM REV CODE 636 W HCPCS: Mod: HCNC | Performed by: INTERNAL MEDICINE

## 2020-02-11 PROCEDURE — 80048 BASIC METABOLIC PNL TOTAL CA: CPT | Mod: 91,HCNC

## 2020-02-11 PROCEDURE — 30200315 PPD INTRADERMAL TEST REV CODE 302: Mod: HCNC | Performed by: INTERNAL MEDICINE

## 2020-02-11 PROCEDURE — 27000221 HC OXYGEN, UP TO 24 HOURS: Mod: HCNC

## 2020-02-11 PROCEDURE — G0378 HOSPITAL OBSERVATION PER HR: HCPCS | Mod: HCNC

## 2020-02-11 PROCEDURE — 25000003 PHARM REV CODE 250: Mod: HCNC | Performed by: FAMILY MEDICINE

## 2020-02-11 PROCEDURE — 80074 ACUTE HEPATITIS PANEL: CPT | Mod: HCNC

## 2020-02-11 PROCEDURE — 81000 URINALYSIS NONAUTO W/SCOPE: CPT | Mod: HCNC

## 2020-02-11 PROCEDURE — 86706 HEP B SURFACE ANTIBODY: CPT | Mod: HCNC

## 2020-02-11 PROCEDURE — 63600175 PHARM REV CODE 636 W HCPCS: Mod: HCNC | Performed by: EMERGENCY MEDICINE

## 2020-02-11 PROCEDURE — 86704 HEP B CORE ANTIBODY TOTAL: CPT | Mod: HCNC

## 2020-02-11 PROCEDURE — 96366 THER/PROPH/DIAG IV INF ADDON: CPT | Mod: 59

## 2020-02-11 PROCEDURE — 99900035 HC TECH TIME PER 15 MIN (STAT): Mod: HCNC

## 2020-02-11 PROCEDURE — 96376 TX/PRO/DX INJ SAME DRUG ADON: CPT

## 2020-02-11 PROCEDURE — 85025 COMPLETE CBC W/AUTO DIFF WBC: CPT | Mod: HCNC

## 2020-02-11 PROCEDURE — 84100 ASSAY OF PHOSPHORUS: CPT | Mod: HCNC

## 2020-02-11 PROCEDURE — 90935 HEMODIALYSIS ONE EVALUATION: CPT | Mod: HCNC

## 2020-02-11 PROCEDURE — 25000242 PHARM REV CODE 250 ALT 637 W/ HCPCS: Mod: HCNC | Performed by: NURSE PRACTITIONER

## 2020-02-11 PROCEDURE — 80048 BASIC METABOLIC PNL TOTAL CA: CPT | Mod: HCNC

## 2020-02-11 PROCEDURE — 94640 AIRWAY INHALATION TREATMENT: CPT | Mod: HCNC

## 2020-02-11 RX ORDER — SODIUM CHLORIDE 9 MG/ML
INJECTION, SOLUTION INTRAVENOUS ONCE
Status: COMPLETED | OUTPATIENT
Start: 2020-02-11 | End: 2020-02-11

## 2020-02-11 RX ORDER — FLUTICASONE FUROATE AND VILANTEROL 100; 25 UG/1; UG/1
1 POWDER RESPIRATORY (INHALATION) DAILY
Status: DISCONTINUED | OUTPATIENT
Start: 2020-02-11 | End: 2020-02-14 | Stop reason: HOSPADM

## 2020-02-11 RX ORDER — ALLOPURINOL 100 MG/1
100 TABLET ORAL DAILY
Status: DISCONTINUED | OUTPATIENT
Start: 2020-02-11 | End: 2020-02-14

## 2020-02-11 RX ORDER — CEFEPIME HYDROCHLORIDE 2 G/50ML
2 INJECTION, SOLUTION INTRAVENOUS
Status: DISCONTINUED | OUTPATIENT
Start: 2020-02-11 | End: 2020-02-14 | Stop reason: HOSPADM

## 2020-02-11 RX ORDER — SODIUM BICARBONATE 650 MG/1
650 TABLET ORAL 2 TIMES DAILY
Status: DISCONTINUED | OUTPATIENT
Start: 2020-02-11 | End: 2020-02-11

## 2020-02-11 RX ORDER — SODIUM CHLORIDE 9 MG/ML
INJECTION, SOLUTION INTRAVENOUS
Status: DISCONTINUED | OUTPATIENT
Start: 2020-02-11 | End: 2020-02-14 | Stop reason: HOSPADM

## 2020-02-11 RX ORDER — METOLAZONE 2.5 MG/1
10 TABLET ORAL ONCE
Status: COMPLETED | OUTPATIENT
Start: 2020-02-11 | End: 2020-02-11

## 2020-02-11 RX ORDER — HYDROCODONE BITARTRATE AND ACETAMINOPHEN 10; 325 MG/1; MG/1
1 TABLET ORAL EVERY 8 HOURS PRN
Status: DISCONTINUED | OUTPATIENT
Start: 2020-02-11 | End: 2020-02-14 | Stop reason: HOSPADM

## 2020-02-11 RX ORDER — CEFEPIME HYDROCHLORIDE 1 G/50ML
1 INJECTION, SOLUTION INTRAVENOUS
Status: DISCONTINUED | OUTPATIENT
Start: 2020-02-11 | End: 2020-02-11

## 2020-02-11 RX ORDER — TRAZODONE HYDROCHLORIDE 100 MG/1
100 TABLET ORAL NIGHTLY PRN
Status: DISCONTINUED | OUTPATIENT
Start: 2020-02-11 | End: 2020-02-14 | Stop reason: HOSPADM

## 2020-02-11 RX ORDER — FUROSEMIDE 20 MG/1
20 TABLET ORAL 2 TIMES DAILY
Status: DISCONTINUED | OUTPATIENT
Start: 2020-02-11 | End: 2020-02-11

## 2020-02-11 RX ORDER — FUROSEMIDE 10 MG/ML
80 INJECTION INTRAMUSCULAR; INTRAVENOUS 3 TIMES DAILY
Status: DISCONTINUED | OUTPATIENT
Start: 2020-02-11 | End: 2020-02-14 | Stop reason: HOSPADM

## 2020-02-11 RX ORDER — AMLODIPINE BESYLATE 2.5 MG/1
2.5 TABLET ORAL DAILY
Status: DISCONTINUED | OUTPATIENT
Start: 2020-02-11 | End: 2020-02-11

## 2020-02-11 RX ORDER — MUPIROCIN 20 MG/G
OINTMENT TOPICAL 2 TIMES DAILY
Status: DISCONTINUED | OUTPATIENT
Start: 2020-02-11 | End: 2020-02-14 | Stop reason: HOSPADM

## 2020-02-11 RX ORDER — BUSPIRONE HYDROCHLORIDE 5 MG/1
5 TABLET ORAL 3 TIMES DAILY
Status: DISCONTINUED | OUTPATIENT
Start: 2020-02-11 | End: 2020-02-14 | Stop reason: HOSPADM

## 2020-02-11 RX ADMIN — MUPIROCIN: 20 OINTMENT TOPICAL at 09:02

## 2020-02-11 RX ADMIN — HYDROCODONE BITARTRATE AND ACETAMINOPHEN 1 TABLET: 10; 325 TABLET ORAL at 04:02

## 2020-02-11 RX ADMIN — EPOETIN ALFA-EPBX 3000 UNITS: 3000 INJECTION, SOLUTION INTRAVENOUS; SUBCUTANEOUS at 12:02

## 2020-02-11 RX ADMIN — BUSPIRONE HYDROCHLORIDE 5 MG: 5 TABLET ORAL at 09:02

## 2020-02-11 RX ADMIN — VANCOMYCIN HYDROCHLORIDE 1000 MG: 1 INJECTION, POWDER, LYOPHILIZED, FOR SOLUTION INTRAVENOUS at 02:02

## 2020-02-11 RX ADMIN — ALLOPURINOL 100 MG: 100 TABLET ORAL at 09:02

## 2020-02-11 RX ADMIN — FUROSEMIDE 80 MG: 10 INJECTION, SOLUTION INTRAVENOUS at 03:02

## 2020-02-11 RX ADMIN — SODIUM BICARBONATE 650 MG TABLET 650 MG: at 09:02

## 2020-02-11 RX ADMIN — SODIUM CHLORIDE: 0.9 INJECTION, SOLUTION INTRAVENOUS at 12:02

## 2020-02-11 RX ADMIN — CEFEPIME HYDROCHLORIDE 2 G: 2 INJECTION, POWDER, FOR SOLUTION INTRAVENOUS at 09:02

## 2020-02-11 RX ADMIN — MUPIROCIN: 20 OINTMENT TOPICAL at 03:02

## 2020-02-11 RX ADMIN — EPOETIN ALFA-EPBX 2000 UNITS: 2000 INJECTION, SOLUTION INTRAVENOUS; SUBCUTANEOUS at 12:02

## 2020-02-11 RX ADMIN — TUBERCULIN PURIFIED PROTEIN DERIVATIVE 5 UNITS: 5 INJECTION INTRADERMAL at 03:02

## 2020-02-11 RX ADMIN — BUSPIRONE HYDROCHLORIDE 5 MG: 5 TABLET ORAL at 10:02

## 2020-02-11 RX ADMIN — METOLAZONE 10 MG: 2.5 TABLET ORAL at 03:02

## 2020-02-11 RX ADMIN — BUSPIRONE HYDROCHLORIDE 5 MG: 5 TABLET ORAL at 03:02

## 2020-02-11 RX ADMIN — FUROSEMIDE 20 MG: 20 TABLET ORAL at 09:02

## 2020-02-11 RX ADMIN — FLUTICASONE FUROATE AND VILANTEROL TRIFENATATE 1 PUFF: 100; 25 POWDER RESPIRATORY (INHALATION) at 08:02

## 2020-02-11 RX ADMIN — AMLODIPINE BESYLATE 2.5 MG: 2.5 TABLET ORAL at 10:02

## 2020-02-11 RX ADMIN — FUROSEMIDE 80 MG: 10 INJECTION, SOLUTION INTRAVENOUS at 09:02

## 2020-02-11 RX ADMIN — ACETAMINOPHEN 650 MG: 325 TABLET ORAL at 09:02

## 2020-02-11 NOTE — CONSULTS
NEPHROLOGY CONSULT NOTE    HPI & INTERVAL HISTORY:    Past Medical History:   Diagnosis Date    Acute congestive heart failure 2/10/2020    Anemia     Bilateral renal cysts     Cataract     Chronic LBP 7/26/2012    Chronic pain     CKD (chronic kidney disease), stage IV     COPD (chronic obstructive pulmonary disease)     Dehydration     Encounter for blood transfusion     HTN (hypertension)     Lumbar spondylosis     Melanoma     of the lip    Metabolic bone disease     Migraines, neuralgic     Osteoporosis     Primary osteoarthritis of both knees     s/p Rt TKA    Pulmonary embolism with infarction     Seizures 1972    x1 only    Subdeltoid bursitis, L>R. 9/27/2012    Ulcer     Vitamin D deficiency disease       Past Surgical History:   Procedure Laterality Date    BLADDER SUSPENSION      CATARACT EXTRACTION  11/18/13    left eye    CERVICAL LAMINECTOMY      x3, fusion x1    COLONOSCOPY  2009    HYSTERECTOMY      JOINT REPLACEMENT  2001    total right knee     LUMBAR LAMINECTOMY      x 3, fusion x1    OOPHORECTOMY      PLACEMENT OF ARTERIOVENOUS GRAFT Left 1/21/2020    Procedure: INSERTION, GRAFT, ARTERIOVENOUS;  Surgeon: Lindsey Louie MD;  Location: [facility] OR;  Service: General;  Laterality: Left;    THROMBECTOMY Left 2/3/2020    Procedure: THROMBECTOMY;  Surgeon: Lindsey Louie MD;  Location: [facility] OR;  Service: General;  Laterality: Left;      Review of patient's allergies indicates:   Allergen Reactions    Aspirin      Other reaction(s): hx of ulcers    Tetracycline Swelling     Other reaction(s): Swelling    Penicillins Rash     Other reaction(s): Hives  Other reaction(s): Rash  Other reaction(s): Rash  Other reaction(s): Hives        (Not in a hospital admission)    Social History     Socioeconomic History    Marital status:      Spouse name: Not on file    Number of children: Not on file    Years of education: Not on file    Highest education level:  Not on file   Occupational History    Not on file   Social Needs    Financial resource strain: Not on file    Food insecurity:     Worry: Not on file     Inability: Not on file    Transportation needs:     Medical: Not on file     Non-medical: Not on file   Tobacco Use    Smoking status: Former Smoker     Packs/day: 1.00     Types: Cigarettes    Smokeless tobacco: Former User     Quit date: 2/3/2015   Substance and Sexual Activity    Alcohol use: Yes     Alcohol/week: 1.0 standard drinks     Types: 1 Glasses of wine per week     Comment: Rare    Drug use: No    Sexual activity: Never   Lifestyle    Physical activity:     Days per week: Not on file     Minutes per session: Not on file    Stress: Not on file   Relationships    Social connections:     Talks on phone: Not on file     Gets together: Not on file     Attends Confucianist service: Not on file     Active member of club or organization: Not on file     Attends meetings of clubs or organizations: Not on file     Relationship status: Not on file   Other Topics Concern    Are you pregnant or think you may be? Not Asked    Breast-feeding Not Asked   Social History Narrative    Not on file        MEDS   sodium chloride 0.9%   Intravenous Once    allopurinoL  100 mg Oral Daily    busPIRone  5 mg Oral TID    ceFEPime (MAXIPIME) IVPB  2 g Intravenous Q24H    epoetin hemant-ebpx (RETACRIT) injection  2,000 Units Intravenous Once    And    epoetin hemant-ebpx (RETACRIT) injection  3,000 Units Intravenous Once    fluticasone furoate-vilanterol  1 puff Inhalation Daily    furosemide  80 mg Intravenous TID    lidocaine (PF) 10 mg/ml (1%)  1 mL Intradermal Once    metOLazone  10 mg Oral Once    mupirocin   Nasal BID                 CONTINOUS INFUSIONS:      Intake/Output Summary (Last 24 hours) at 2/11/2020 1209  Last data filed at 2/11/2020 0356  Gross per 24 hour   Intake 1100 ml   Output 600 ml   Net 500 ml        HEMODYNAMICS:    Temp:  [97.6 °F  (36.4 °C)-98.2 °F (36.8 °C)] 97.6 °F (36.4 °C)  Pulse:  [71-94] 86  Resp:  [17-26] 23  SpO2:  [91 %-98 %] 93 %  BP: ()/(52-68) 125/58      SOB  Cough  No CP  Nausea  No vomiting  No diarrhea  Weak  No fever  No chills  Poor intake  Cards: Pulse 80  Pul: diminished breath sounds  Abdomen soft   Ext: edema   LABS   Lab Results   Component Value Date    WBC 7.99 02/11/2020    HGB 8.2 (L) 02/11/2020    HCT 25.9 (L) 02/11/2020    MCV 89 02/11/2020     02/11/2020        Recent Labs   Lab 02/10/20  1957 02/11/20  0451    89  88   CALCIUM 9.3 9.0  8.9   ALBUMIN 3.9  --    PROT 7.9  --    * 123*  123*   K 5.2* 4.9  4.9   CO2 24 26  26   CL 86* 86*  86*   BUN 57* 56*  56*   CREATININE 4.0* 3.7*  3.7*   ALKPHOS 160*  --    ALT 10  --    AST 23  --    BILITOT 0.5  --       Lab Results   Component Value Date    .0 (H) 12/28/2018    CALCIUM 9.0 02/11/2020    CALCIUM 8.9 02/11/2020    PHOS 5.4 (H) 02/11/2020      Lab Results   Component Value Date    IRON 35 07/17/2019    TIBC 423 07/17/2019    FERRITIN 148 07/17/2019        ABG  Recent Labs   Lab 02/10/20  2208   PH 7.411   PO2 72*   PCO2 43.2   HCO3 27.5   BE 3         IMAGING:  CXR    ASSESSMENT / PLAN  ESRD  Left arm AB graft  UA no protein  Metabolic bone disease  Secondary hyperparathyroidism  Hyperphosphatemia  Hyponatremia  Volume overload  Azotemia  BUN 56  Creatinine 3.7  Anemia secondary to ESRD  Poor nutrition  Albumin 3.9  CXR-  Increase in left pleural effusion with associated compressive atelectasis and/or lower lobe consolidation.     2019 echo  · Normal left ventricular systolic function. The estimated ejection fraction is 55%  · Grade II (moderate) left ventricular diastolic dysfunction consistent with pseudonormalization.  · Elevated left atrial pressure.  · No wall motion abnormalities.  · Normal right ventricular systolic function.  · No TR Jet to calculate PA pressure  · Intermediate central venous pressure (8 mm  Hg).  · There is a large left pleural effusion.      Dialysis today and tomorrow.  Weight daily  Renal diet.  Supplements.

## 2020-02-11 NOTE — PROGRESS NOTES
Pharmacist Renal Dose Adjustment Note    Katie Quevedo is a 74 y.o. female being treated with the medication cefepime.    Patient Data:    Vital Signs (Most Recent):  Temp: 98 °F (36.7 °C) (02/11/20 0357)  Pulse: 72 (02/11/20 0357)  Resp: 20 (02/11/20 0357)  BP: 117/68 (02/11/20 0357)  SpO2: 97 % (02/11/20 0357) Vital Signs (72h Range):  Temp:  [97.9 °F (36.6 °C)-98.2 °F (36.8 °C)]   Pulse:  [72-94]   Resp:  [17-26]   BP: ()/(52-68)   SpO2:  [91 %-98 %]      Recent Labs   Lab 02/10/20  1957   CREATININE 4.0*     Serum creatinine: 4 mg/dL (H) 02/10/20 1957  Estimated creatinine clearance: 9.6 mL/min (A)    Medication: Cefepime 1 gm IV q12h will be changed to cefepime 2 gm IV q24h.    Pharmacist's Name: Dev Riddle  Pharmacist's Extension: [phone]

## 2020-02-11 NOTE — NURSING
Received report from DANIEL Mai in the ED. Awaiting patient arrival to Room 468. Patient is currently in dialysis and will be transported to room following.

## 2020-02-11 NOTE — ED PROVIDER NOTES
Encounter Date: 2/10/2020       History     Chief Complaint   Patient presents with    Shortness of Breath     Reports increased SOB over the past 2 days. Reports swelling to Ishan ankles. Hx of COPD and CHF. SPO2 92% on 2L home O2.      Katie Quevedo is a 74 y.o. female who  has a past medical history of Anemia, Bilateral renal cysts, Cataract, Chronic LBP (7/26/2012), Chronic pain, CKD (chronic kidney disease), stage IV, COPD (chronic obstructive pulmonary disease), Dehydration, Encounter for blood transfusion, HTN (hypertension), Lumbar spondylosis, Melanoma, Metabolic bone disease, Migraines, neuralgic, Osteoporosis, Primary osteoarthritis of both knees, Pulmonary embolism with infarction, Seizures (1972), Subdeltoid bursitis, L>R. (9/27/2012), Ulcer, and Vitamin D deficiency disease.    The patient presents to the ED due to shortness of breath.   Patient reports symptoms started today. Patient reports she feels like she has fluid in her lungs. She notes this has happened in the past, this being the third time. Patient reports she takes Lasix, and notes it usually helps.  Patient denies fever, chills, chest pain, urinary problems, nausea, vomiting, abdominal pain, or any other symptoms. Patient is a former smoker. Patient is a Dialysis patient, and denies having had dialysis yet.    The history is provided by the patient.     Review of patient's allergies indicates:   Allergen Reactions    Aspirin      Other reaction(s): hx of ulcers    Tetracycline Swelling     Other reaction(s): Swelling    Penicillins Rash     Other reaction(s): Hives  Other reaction(s): Rash  Other reaction(s): Rash  Other reaction(s): Hives     Past Medical History:   Diagnosis Date    Acute congestive heart failure 2/10/2020    Anemia     Bilateral renal cysts     Cataract     Chronic LBP 7/26/2012    Chronic pain     CKD (chronic kidney disease), stage IV     COPD (chronic obstructive pulmonary disease)     Dehydration      Encounter for blood transfusion     HTN (hypertension)     Lumbar spondylosis     Melanoma     of the lip    Metabolic bone disease     Migraines, neuralgic     Osteoporosis     Primary osteoarthritis of both knees     s/p Rt TKA    Pulmonary embolism with infarction     Seizures 1972    x1 only    Subdeltoid bursitis, L>R. 9/27/2012    Ulcer     Vitamin D deficiency disease      Past Surgical History:   Procedure Laterality Date    BLADDER SUSPENSION      CATARACT EXTRACTION  11/18/13    left eye    CERVICAL LAMINECTOMY      x3, fusion x1    COLONOSCOPY  2009    HYSTERECTOMY      JOINT REPLACEMENT  2001    total right knee     LUMBAR LAMINECTOMY      x 3, fusion x1    OOPHORECTOMY      PLACEMENT OF ARTERIOVENOUS GRAFT Left 1/21/2020    Procedure: INSERTION, GRAFT, ARTERIOVENOUS;  Surgeon: Lindsey Louie MD;  Location: [facility] OR;  Service: General;  Laterality: Left;    THROMBECTOMY Left 2/3/2020    Procedure: THROMBECTOMY;  Surgeon: Lindsey Louie MD;  Location: [facility] OR;  Service: General;  Laterality: Left;     Family History   Problem Relation Age of Onset    Arthritis Mother     Stroke Mother     Hypertension Father     Cancer Father     Cataracts Father     Diabetes Maternal Aunt     Hypertension Maternal Grandfather     Heart disease Maternal Grandfather     Heart attack Maternal Grandfather     Cataracts Sister     Glaucoma Cousin      Social History     Tobacco Use    Smoking status: Former Smoker     Packs/day: 1.00     Types: Cigarettes    Smokeless tobacco: Former User     Quit date: 2/3/2015   Substance Use Topics    Alcohol use: Yes     Alcohol/week: 1.0 standard drinks     Types: 1 Glasses of wine per week     Comment: Rare    Drug use: No     Review of Systems   Constitutional: Negative for chills and fever.   Respiratory: Positive for shortness of breath.    Cardiovascular: Negative for chest pain.   Gastrointestinal: Negative for abdominal pain, nausea  and vomiting.   Genitourinary: Negative for decreased urine volume and difficulty urinating.   All other systems reviewed and are negative.      Physical Exam     Initial Vitals [02/10/20 1900]   BP Pulse Resp Temp SpO2   (!) 117/55 94 (!) 26 98.1 °F (36.7 °C) (!) 91 %      MAP       --         Physical Exam    Nursing note and vitals reviewed.  Constitutional: She appears well-nourished. She is not diaphoretic. No distress.   Chronically ill appearing.   HENT:   Head: Normocephalic and atraumatic.   Eyes: Conjunctivae and EOM are normal.   Neck: Normal range of motion. Neck supple.   Cardiovascular: Normal rate and regular rhythm.   Murmur heard.   Systolic murmur is present with a grade of 2/6.  Heart 2/6 systolic murmur. Normal rate.   Pulmonary/Chest: She is in respiratory distress.   Moderate respiratory distress. Increase respiratory effort and crackles noted bilaterally.   Abdominal: Soft. There is no tenderness.   Musculoskeletal: Normal range of motion. She exhibits no edema or tenderness.   Neurological: She is alert and oriented to person, place, and time. She has normal strength.   Skin: Skin is warm and dry. Capillary refill takes less than 2 seconds.   New left arm AV fistula noted, no palpable thrill noted yet.   Psychiatric: She has a normal mood and affect. Her behavior is normal. Judgment and thought content normal.         ED Course   Procedures  Labs Reviewed   CBC W/ AUTO DIFFERENTIAL - Abnormal; Notable for the following components:       Result Value    RBC 3.24 (*)     Hemoglobin 9.2 (*)     Hematocrit 29.3 (*)     Mean Corpuscular Hemoglobin Conc 31.4 (*)     RDW 15.1 (*)     Immature Granulocytes 0.6 (*)     Immature Grans (Abs) 0.05 (*)     Lymph # 0.8 (*)     Gran% 77.7 (*)     Lymph% 8.8 (*)     All other components within normal limits   COMPREHENSIVE METABOLIC PANEL - Abnormal; Notable for the following components:    Sodium 125 (*)     Potassium 5.2 (*)     Chloride 86 (*)     BUN,  Bld 57 (*)     Creatinine 4.0 (*)     Alkaline Phosphatase 160 (*)     eGFR if  12 (*)     eGFR if non  10 (*)     All other components within normal limits   B-TYPE NATRIURETIC PEPTIDE - Abnormal; Notable for the following components:     (*)     All other components within normal limits   PROCALCITONIN - Abnormal; Notable for the following components:    Procalcitonin 0.31 (*)     All other components within normal limits   ISTAT PROCEDURE - Abnormal; Notable for the following components:    POC PO2 72 (*)     POC SATURATED O2 94 (*)     POC TCO2 29 (*)     All other components within normal limits   CULTURE, BLOOD   CULTURE, BLOOD   TROPONIN I   PROTIME-INR   PROCALCITONIN   LEGIONELLA ANTIGEN, URINE RANDOM   BASIC METABOLIC PANEL   MAGNESIUM   PHOSPHORUS   CBC W/ AUTO DIFFERENTIAL   BASIC METABOLIC PANEL     EKG Readings: (Independently Interpreted)   Normal sinus rhythm, low voltage, no acute ST elevations       Imaging Results          X-Ray Chest AP Portable (Final result)  Result time 02/10/20 21:27:28    Final result by Stephanie Mercado MD (02/10/20 21:27:28)                 Impression:      Increase in left pleural effusion with associated compressive atelectasis and/or lower lobe consolidation.      Electronically signed by: Stephanie Mercado  Date:    02/10/2020  Time:    21:27             Narrative:    EXAMINATION:  AP PORTABLE CHEST    CLINICAL HISTORY:  CHF;    TECHNIQUE:  AP portable chest radiograph was submitted.    COMPARISON:  09/26/2019    FINDINGS:  AP portable chest radiograph demonstrates a cardiac silhouette within normal limits.  There is increase left pleural fluid with associated compressive atelectasis and/or lower lobe consolidation.  There is no pneumothorax.  The right lung appears to be grossly clear.  Mild dextroscoliosis is present.  There is incompletely imaged cervical hardware.                                 Medical Decision Making:    Initial Assessment:   74-year-old female history of COPD, CHF, end-stage renal disease, with new left AV fistula about to begin dialysis, who presents the ER for evaluation shortness of breath.  Onset today.  Patient reports he feels as if there fluid on her lungs.  She reports this is the 3rd time something like this has happened before.  She reports compliance with her medication and diet.  She denies fever, chills, chest pain nausea vomiting or abdominal pain. She is in moderate respiratory distress with accessory muscle use and crackles noted bilaterally. She is otherwise hemodynamically stable with a pulse ox of 91% on air, she does have crackles at her lungs.  Differential includes CHF exacerbation, COPD exacerbation, NSTEMI, pneumonia, versus other cause.  Will obtain blood work x-ray IV Lasix and will reassess.  Clinical Tests:   Lab Tests: Ordered and Reviewed  Radiological Study: Ordered and Reviewed  Medical Tests: Ordered and Reviewed            Scribe Attestation:   Scribe #1: I performed the above scribed service and the documentation accurately describes the services I performed. I attest to the accuracy of the note.    Attending Attestation:           Physician Attestation for Scribe:  Physician Attestation Statement for Scribe #1: I, Dr. Ding, reviewed documentation, as scribed by Gabby Naik in my presence, and it is both accurate and complete.                 ED Course as of Feb 11 0245   Mon Feb 10, 2020   2107 RestingIn bed no acute distress, medications given.  Bedside ultrasound performed by myself revealed diminished ejection fraction, without any pericardial effusion/tamponade physiology.  Awaiting rest of blood work and x-ray will reassess.    [SE]   2127 Resting in bed, no acute distress, labs imaging reviewed.  Patient noted to be in a KI with creatinine of 4 with a  baseline of 1.8-2, as well as hyponatremia at 125 with a normal sodium level approximately 2 weeks ago.  I  discussed with patient her electrolyte abnormalities.  I discussed with her plan to admit for rehydration, and medication change possibly.  Patient understood agreed with admission.  Will plan to admit patient.    [SE]   2154 Resting in bed, no acute distress, x-ray reviewed concerning for new left lower lobe consolidation/pneumonia.Obtain ultrasound, consistent of lower lobe consolidation with pneumonia.  Will treat for hospital-acquired pneumonia.    Will also obtain blood cultures as well as Legionella antibody and broke out.  Updated patient who understands agrees cleared    [SE]      ED Course User Index  [SE] Dolly Ding MD                Clinical Impression:       ICD-10-CM ICD-9-CM   1. Acute congestive heart failure, unspecified heart failure type I50.9 428.0   2. Shortness of breath R06.02 786.05   3. Hyponatremia E87.1 276.1   4. Acute kidney injury N17.9 584.9   5. Chest pain R07.9 786.50   6. CKD (chronic kidney disease) stage 4, GFR 15-29 ml/min N18.4 585.4   7. Anemia, chronic renal failure, stage 4 (severe) N18.4 285.21    D63.1 585.4   8. CRF (chronic renal failure), stage 4 (severe) N18.4 585.4   9. Pneumonia of left lower lobe due to infectious organism J18.1 486         Disposition:   Disposition: Admitted  Condition: Stable                 Dolly Ding MD  02/11/20 0245

## 2020-02-11 NOTE — PLAN OF CARE
VN cued into pt's room for introduction. VN informed pt that VN would be working along side bedside nurse and PCT throughout shift. Level of present pain assessed. At present no distress noted. Thoroughly discussed with patient and  plan of care. Completed admission assessment data collection with assistance from patient and . Discussed with patient High fall risk protocol and interventions that have been initiated and cont be in place for safety. Patient verbalized clear understanding and cooperation using teach back method. Bed alarm presently activated and in use. Will cont to be available to patient and intervene prn.

## 2020-02-11 NOTE — ASSESSMENT & PLAN NOTE
Pleural effusion  ? Left lower lobe pneumonia   Supplemental home O2 (usually on 2L)       Continue furosemide at home dose-  20 mg  BID, titrate as needed   Continue empiric cefepime, follow blood cultures, prn decongestants and  Nebs   Consider IR consult for thoracentesis   Continue supplemental O2

## 2020-02-11 NOTE — ASSESSMENT & PLAN NOTE
Anemia, chronic renal failure, stage 4 (severe)  Hyponatremia   Hyperkalemia     Pt has Left AV graft. Reports has not started dialysis. Nephrology consulted. Avoid nephrotoxic meds

## 2020-02-11 NOTE — NURSING
Patient arrived to room in stretcher from dialysis. Patient AAOx4 and reports back pain a 6/10. Spouse at bedside. IV clean, dry, and intact. Bed alarm on, bed locked and low, side rails up x2, and call bell in reach. Fall risk band and yellow non skid socks placed on patient. Patient oriented to room and call bell usage and informed about VN as part of healthcare team.  Limb alert band on left arm where AV graft is present. VSS. O2 saturation is 100% on 4L of oxygen via NC.

## 2020-02-11 NOTE — ASSESSMENT & PLAN NOTE
Pleural effusion  ? Left lower lobe pneumonia     75 yo female presents to ED with 3 day  Hx of worsening SOB/BERTRAND despite home O2 (usually on 2L). She reports associated cough/congestion, lower extremity edema and decreased urinary output. CXR: Increase in left pleural effusion with associated compressive atelectasis and/or lower lobe consolidation. Pt afebrile, BP stable. She received furosemide  80 mg and empiric antibiotics. Mild respiratory distress on exam, O2 sats 94-97% on 2 liters nasal cannula.     Continue furosemide at home dose-  20 mg  BID, titrate as needed   Continue empiric cefepime, follow blood cultures, prn decongestants and  Nebs   Consider IR consult for thoracentesis   Continue supplemental O2

## 2020-02-11 NOTE — SUBJECTIVE & OBJECTIVE
Interval History: awake and alert, still feeling SOB but on same home oxygen requirement- 2 l  Awaiting nephrology for HD    Review of Systems   Constitutional: Negative for chills, diaphoresis and fever.   Respiratory: Positive for cough and shortness of breath. Negative for chest tightness and wheezing.    Cardiovascular: Positive for leg swelling. Negative for chest pain and palpitations.   Gastrointestinal: Negative for abdominal pain, diarrhea, nausea and vomiting.   Genitourinary: Negative for frequency.   Musculoskeletal: Negative for back pain and myalgias.   Neurological: Negative for light-headedness and headaches.   Psychiatric/Behavioral: Negative for confusion.     Objective:     Vital Signs (Most Recent):  Temp: 98 °F (36.7 °C) (02/11/20 0707)  Pulse: 74 (02/11/20 0825)  Resp: 18 (02/11/20 0825)  BP: (!) 104/56 (02/11/20 0707)  SpO2: 96 % (02/11/20 0825) Vital Signs (24h Range):  Temp:  [97.9 °F (36.6 °C)-98.2 °F (36.8 °C)] 98 °F (36.7 °C)  Pulse:  [71-94] 74  Resp:  [17-26] 18  SpO2:  [91 %-98 %] 96 %  BP: ()/(52-68) 104/56     Weight: 59 kg (130 lb)  Body mass index is 23.59 kg/m².    Intake/Output Summary (Last 24 hours) at 2/11/2020 0858  Last data filed at 2/11/2020 0356  Gross per 24 hour   Intake 1100 ml   Output 600 ml   Net 500 ml      Physical Exam   Constitutional: She is oriented to person, place, and time. She appears well-developed and well-nourished.   HENT:   Head: Normocephalic and atraumatic.   Neck: Neck supple. No JVD present.   Cardiovascular: Normal rate, regular rhythm, normal heart sounds and intact distal pulses.   Pulmonary/Chest: She is in respiratory distress (mild ). She has no wheezes.   Abdominal: Soft. Bowel sounds are normal. She exhibits no distension. There is no guarding.   Musculoskeletal: Normal range of motion. She exhibits no edema or tenderness.   Neurological: She is alert and oriented to person, place, and time.   Skin: Skin is warm and dry. Capillary  refill takes less than 2 seconds.   Psychiatric: She has a normal mood and affect.       Significant Labs:   ABGs:   Recent Labs   Lab 02/10/20  2208   PH 7.411   PCO2 43.2   HCO3 27.5   POCSATURATED 94*   BE 3     CBC:   Recent Labs   Lab 02/10/20  1957 02/11/20  0451   WBC 9.00 7.99   HGB 9.2* 8.2*   HCT 29.3* 25.9*    248     CMP:   Recent Labs   Lab 02/10/20  1957 02/11/20  0451   * 123*  123*   K 5.2* 4.9  4.9   CL 86* 86*  86*   CO2 24 26  26    89  88   BUN 57* 56*  56*   CREATININE 4.0* 3.7*  3.7*   CALCIUM 9.3 9.0  8.9   PROT 7.9  --    ALBUMIN 3.9  --    BILITOT 0.5  --    ALKPHOS 160*  --    AST 23  --    ALT 10  --    ANIONGAP 15 11  11   EGFRNONAA 10* 11*  11*     Coagulation:   Recent Labs   Lab 02/10/20  1957   INR 1.0     Lactic Acid: No results for input(s): LACTATE in the last 48 hours.  TSH: No results for input(s): TSH in the last 4320 hours.  Urine Culture: No results for input(s): LABURIN in the last 48 hours.  Urine Studies: No results for input(s): COLORU, APPEARANCEUA, PHUR, SPECGRAV, PROTEINUA, GLUCUA, KETONESU, BILIRUBINUA, OCCULTUA, NITRITE, UROBILINOGEN, LEUKOCYTESUR, RBCUA, WBCUA, BACTERIA, SQUAMEPITHEL, HYALINECASTS in the last 48 hours.    Invalid input(s): WRIGHTSUR  Imaging Results          X-Ray Chest AP Portable (Final result)  Result time 02/10/20 21:27:28    Final result by Stephanie Mercado MD (02/10/20 21:27:28)                 Impression:      Increase in left pleural effusion with associated compressive atelectasis and/or lower lobe consolidation.      Electronically signed by: Stephanie Mercado  Date:    02/10/2020  Time:    21:27             Narrative:    EXAMINATION:  AP PORTABLE CHEST    CLINICAL HISTORY:  CHF;    TECHNIQUE:  AP portable chest radiograph was submitted.    COMPARISON:  09/26/2019    FINDINGS:  AP portable chest radiograph demonstrates a cardiac silhouette within normal limits.  There is increase left pleural fluid with  associated compressive atelectasis and/or lower lobe consolidation.  There is no pneumothorax.  The right lung appears to be grossly clear.  Mild dextroscoliosis is present.  There is incompletely imaged cervical hardware.                                Significant Imaging: I have reviewed all pertinent imaging results/findings within the past 24 hours.

## 2020-02-11 NOTE — ED NOTES
Bedside commode placed at bedside. Instructed pt to use call bell to obtain assistance to use bedside commode.

## 2020-02-11 NOTE — ED NOTES
Pt's  at bedside, labored breathing with O2 2L at 94% n/c. Used BSC with 1 person min assist and increased SOB and decreased O2 sat to 85%. Urine output 450mL clear yellow urine. Chronic back pain pt states she takes [place] at home from PCP. Tylenol given PRN x1 with minimal effect. Ate 75% of renal diet breakfast. Meds accepted as ordered. Taken by stretcher to dialysis at this time. TIGIST HD access intact. Belongings given to .

## 2020-02-11 NOTE — PROVIDER PROGRESS NOTES - EMERGENCY DEPT.
Encounter Date: 2/10/2020    ED Physician Progress Notes        Physician Note:   Patient is a 74 year old female with past medical history of COPD (on 3 L of home O2) and CHF who presents to ED with complaints of shortness of breath worsening over the last 3 days with lower extremity swelling.  Patient reports that she took a total of 60 mg of Lasix today.    O2 sat 91% on home O2.  Mildly tachypneic.    Patient evaluated in triage.  She will be placed in a room for further evaluation.

## 2020-02-11 NOTE — ASSESSMENT & PLAN NOTE
Diastolic dysfunction, left ventricle    SBP , troponin negative, BNP mildly elevated, pt denies chest pain    Continue amlodipine and furosemide

## 2020-02-11 NOTE — HPI
Katie Quevedo is a 74 y.o. female with past medical history of anemia, asthma, bilateral renal cysts, chronic pain, CKD stage 5 (has not started HD), COPD on 2L home O2, dehydration, HTN, lumbar spondylosis, migraines, osteoporosis, osteoarthritis, pulmonary embolism, and seizures who presented to ED with worsening SOB and BERTRAND. Onset 3 days ago, gradually becoming worse. Patient reports associated cough/congestion and lower extremity edema. She denies chest pain and fever/chills. Reports decreased urinary output. Labs remarkable for Na 125, K 5.2, BUN/Cr 57/4.0 (previously 32/2.9 on 2/3/20),  , procalcitonin 0.31. Pt afebrile. CXR shows increase in left pleural effusion with associated compressive atelectasis and/or lower lobe consolidation. She received furosemide 80 mg, vancomycin, cefepime and azithromycin. Patient admitted to [facility].

## 2020-02-11 NOTE — H&P
[facility]  History & Physical    Patient Name: Katie Quevedo  MRN: [id number]  Admission Date: 2/10/2020  Attending Physician: Dolly Ding MD   Primary Care Provider: Kingston Verduzco MD         Patient information was obtained from patient, past medical records and ER records.     Subjective:     Principal Problem:Acute on chronic respiratory failure with hypoxia    Chief Complaint:   Chief Complaint   Patient presents with    Shortness of Breath     Reports increased SOB over the past 2 days. Reports swelling to Ishan ankles. Hx of COPD and CHF. SPO2 92% on 2L home O2.         HPI: Katie Quevedo is a 74 y.o. female with past medical history of anemia, asthma, bilateral renal cysts, chronic pain, CKD stage 5 (has not started HD), COPD on 2L home O2, dehydration, HTN, lumbar spondylosis, migraines, osteoporosis, osteoarthritis, pulmonary embolism, and seizures who presented to ED with worsening SOB and BERTRAND. Onset 3 days ago, gradually becoming worse. Patient reports associated cough/congestion and lower extremity edema. She denies chest pain and fever/chills. Reports decreased urinary output. Labs remarkable for Na 125, K 5.2, BUN/Cr 57/4.0 (previously 32/2.9 on 2/3/20),  , procalcitonin 0.31. Pt afebrile. CXR shows increase in left pleural effusion with associated compressive atelectasis and/or lower lobe consolidation. She received furosemide 80 mg, vancomycin, cefepime and azithromycin. Patient admitted to [facility].     Past Medical History:   Diagnosis Date    Acute congestive heart failure 2/10/2020    Anemia     Bilateral renal cysts     Cataract     Chronic LBP 7/26/2012    Chronic pain     CKD (chronic kidney disease), stage IV     COPD (chronic obstructive pulmonary disease)     Dehydration     Encounter for blood transfusion     HTN (hypertension)     Lumbar spondylosis     Melanoma     of the lip    Metabolic bone disease      Migraines, neuralgic     Osteoporosis     Primary osteoarthritis of both knees     s/p Rt TKA    Pulmonary embolism with infarction     Seizures 1972    x1 only    Subdeltoid bursitis, L>R. 9/27/2012    Ulcer     Vitamin D deficiency disease        Past Surgical History:   Procedure Laterality Date    BLADDER SUSPENSION      CATARACT EXTRACTION  11/18/13    left eye    CERVICAL LAMINECTOMY      x3, fusion x1    COLONOSCOPY  2009    HYSTERECTOMY      JOINT REPLACEMENT  2001    total right knee     LUMBAR LAMINECTOMY      x 3, fusion x1    OOPHORECTOMY      PLACEMENT OF ARTERIOVENOUS GRAFT Left 1/21/2020    Procedure: INSERTION, GRAFT, ARTERIOVENOUS;  Surgeon: Lindsey Louie MD;  Location: [facility] OR;  Service: General;  Laterality: Left;    THROMBECTOMY Left 2/3/2020    Procedure: THROMBECTOMY;  Surgeon: Lindsey Louie MD;  Location: [facility] OR;  Service: General;  Laterality: Left;       Review of patient's allergies indicates:   Allergen Reactions    Aspirin      Other reaction(s): hx of ulcers    Tetracycline Swelling     Other reaction(s): Swelling    Penicillins Rash     Other reaction(s): Hives  Other reaction(s): Rash  Other reaction(s): Rash  Other reaction(s): Hives       Current Facility-Administered Medications on File Prior to Encounter   Medication    lidocaine (PF) 10 mg/ml (1%) injection 10 mg     Current Outpatient Medications on File Prior to Encounter   Medication Sig    albuterol (PROVENTIL) 2.5 mg /3 mL (0.083 %) nebulizer solution Take 3 mLs (2.5 mg total) by nebulization every 6 (six) hours as needed for Wheezing. Rescue    albuterol-ipratropium (DUO-NEB) 2.5 mg-0.5 mg/3 mL nebulizer solution Take 3 mLs by nebulization every 6 (six) hours as needed for Shortness of Breath. Rescue    allopurinol (ZYLOPRIM) 100 MG tablet Take 1 tablet (100 mg total) by mouth once daily. Take 100 mg -on Monday and Friday only    amLODIPine (NORVASC) 10 MG tablet Take 10 mg by mouth  once daily.    busPIRone (BUSPAR) 5 MG Tab Take 5 mg by mouth 3 (three) times daily.     cyproheptadine (PERIACTIN) 4 mg tablet Take 1 tablet (4 mg total) by mouth every 8 (eight) hours.    diazePAM (VALIUM) 2 MG tablet Take 2 mg by mouth once daily.     diphenhydrAMINE (BENADRYL) 25 mg capsule Take 25 mg by mouth every 6 (six) hours as needed for Itching.    fluticasone-umeclidin-vilanter (TRELEGY ELLIPTA) 100-62.5-25 mcg DsDv Inhale 1 puff by mouth into the lungs once daily.    furosemide (LASIX) 20 MG tablet Take 1 tablet (20 mg total) by mouth 2 (two) times daily.    HYDROcodone-acetaminophen (NORCO)  mg per tablet Take 1 tablet by mouth 3 (three) times daily as needed.    HYDROcodone-acetaminophen (NORCO) 5-325 mg per tablet Take 1 tablet by mouth every 6 (six) hours as needed for Pain.    ondansetron (ZOFRAN-ODT) 4 MG TbDL Dissolve one tablet under the tongue every 6 (six) hours as needed.    sodium bicarbonate 650 MG tablet Take 1 tablet (650 mg total) by mouth 2 (two) times daily.    traZODone (DESYREL) 100 MG tablet Take 100 mg by mouth nightly as needed for Insomnia.    zolpidem (AMBIEN) 5 MG Tab Take 5 mg by mouth every evening.      Family History     Problem Relation (Age of Onset)    Arthritis Mother    Cancer Father    Cataracts Father, Sister    Diabetes Maternal Aunt    Glaucoma Cousin    Heart attack Maternal Grandfather    Heart disease Maternal Grandfather    Hypertension Father, Maternal Grandfather    Stroke Mother        Tobacco Use    Smoking status: Former Smoker     Packs/day: 1.00     Types: Cigarettes    Smokeless tobacco: Former User     Quit date: 2/3/2015   Substance and Sexual Activity    Alcohol use: Yes     Alcohol/week: 1.0 standard drinks     Types: 1 Glasses of wine per week     Comment: Rare    Drug use: No    Sexual activity: Never     Review of Systems   Constitutional: Negative for chills, diaphoresis and fever.   Eyes: Negative for photophobia.    Respiratory: Positive for cough and shortness of breath. Negative for chest tightness and wheezing.    Cardiovascular: Positive for leg swelling. Negative for chest pain and palpitations.   Gastrointestinal: Negative for abdominal pain, diarrhea, nausea and vomiting.   Genitourinary: Negative for dysuria, flank pain, frequency and hematuria.   Musculoskeletal: Negative for back pain and myalgias.   Neurological: Negative for dizziness, syncope, light-headedness and headaches.   Psychiatric/Behavioral: Negative for confusion.     Objective:     Vital Signs (Most Recent):  Temp: 98 °F (36.7 °C) (02/11/20 0357)  Pulse: 72 (02/11/20 0357)  Resp: 20 (02/11/20 0357)  BP: 117/68 (02/11/20 0357)  SpO2: 97 % (02/11/20 0357) Vital Signs (24h Range):  Temp:  [97.9 °F (36.6 °C)-98.2 °F (36.8 °C)] 98 °F (36.7 °C)  Pulse:  [72-94] 72  Resp:  [17-26] 20  SpO2:  [91 %-98 %] 97 %  BP: ()/(52-68) 117/68     Weight: 59 kg (130 lb)  Body mass index is 23.59 kg/m².    Physical Exam   Constitutional: She is oriented to person, place, and time. She appears well-developed and well-nourished.   HENT:   Head: Normocephalic and atraumatic.   Eyes: Pupils are equal, round, and reactive to light. Conjunctivae are normal.   Neck: Normal range of motion. No JVD present.   Cardiovascular: Normal rate, regular rhythm, normal heart sounds and intact distal pulses.   Pulmonary/Chest: She is in respiratory distress (mild ). She has no wheezes.   Abdominal: Soft. Bowel sounds are normal. She exhibits no distension. There is no tenderness. There is no guarding.   Musculoskeletal: Normal range of motion. She exhibits no edema or tenderness.   Neurological: She is alert and oriented to person, place, and time.   Skin: Skin is warm and dry. Capillary refill takes less than 2 seconds.   Psychiatric: She has a normal mood and affect. Her behavior is normal.         CRANIAL NERVES     CN III, IV, VI   Pupils are equal, round, and reactive to  light.       Significant Labs:   BMP:   Recent Labs   Lab 02/11/20  0451   GLU 89  88   *  123*   K 4.9  4.9   CL 86*  86*   CO2 26  26   BUN 56*  56*   CREATININE 3.7*  3.7*   CALCIUM 9.0  8.9   MG 2.7*     CBC:   Recent Labs   Lab 02/10/20  1957   WBC 9.00   HGB 9.2*   HCT 29.3*          Significant Imaging: I have reviewed all pertinent imaging results/findings within the past 24 hours.    Assessment/Plan:     * Acute on chronic respiratory failure with hypoxia  Pleural effusion  ? Left lower lobe pneumonia     75 yo female presents to ED with 3 day  Hx of worsening SOB/BERTRAND despite home O2 (usually on 2L). She reports associated cough/congestion, lower extremity edema and decreased urinary output. CXR: Increase in left pleural effusion with associated compressive atelectasis and/or lower lobe consolidation. Pt afebrile, BP stable. She received furosemide  80 mg and empiric antibiotics. Mild respiratory distress on exam, O2 sats 94-97% on 2 liters nasal cannula.     Continue furosemide at home dose-  20 mg  BID, titrate as needed   Continue empiric cefepime, follow blood cultures, prn decongestants and  Nebs   Consider IR consult for thoracentesis   Continue supplemental O2         COPD (chronic obstructive pulmonary disease)  Chronic, continue Trelegy Ellipta, supplemental O2       Acute kidney injury superimposed on CKD  Anemia, chronic renal failure, stage 4 (severe)  Hyponatremia   Hyperkalemia     Pt has Left AV graft. Reports has not started dialysis. Nephrology consulted. Avoid nephrotoxic meds        Essential hypertension  Diastolic dysfunction, left ventricle    SBP , troponin negative, BNP mildly elevated, pt denies chest pain    Continue amlodipine and furosemide       VTE Risk Mitigation (From admission, onward)         Ordered     IP VTE HIGH RISK PATIENT  Once      02/10/20 2134     Place sequential compression device  Until discontinued      02/10/20 2134                    Nevaeh Marr NP  Department of Hospital Medicine   [facility]

## 2020-02-12 LAB
ANION GAP SERPL CALC-SCNC: 11 MMOL/L (ref 8–16)
BASOPHILS # BLD AUTO: 0.07 K/UL (ref 0–0.2)
BASOPHILS NFR BLD: 1 % (ref 0–1.9)
BUN SERPL-MCNC: 38 MG/DL (ref 8–23)
CALCIUM SERPL-MCNC: 8.7 MG/DL (ref 8.7–10.5)
CHLORIDE SERPL-SCNC: 95 MMOL/L (ref 95–110)
CO2 SERPL-SCNC: 22 MMOL/L (ref 23–29)
CREAT SERPL-MCNC: 2.6 MG/DL (ref 0.5–1.4)
DIFFERENTIAL METHOD: ABNORMAL
EOSINOPHIL # BLD AUTO: 0.4 K/UL (ref 0–0.5)
EOSINOPHIL NFR BLD: 6.3 % (ref 0–8)
ERYTHROCYTE [DISTWIDTH] IN BLOOD BY AUTOMATED COUNT: 15.1 % (ref 11.5–14.5)
EST. GFR  (AFRICAN AMERICAN): 20 ML/MIN/1.73 M^2
EST. GFR  (NON AFRICAN AMERICAN): 17 ML/MIN/1.73 M^2
GLUCOSE SERPL-MCNC: 85 MG/DL (ref 70–110)
HAV IGM SERPL QL IA: NEGATIVE
HBV CORE AB SERPL QL IA: NEGATIVE
HBV CORE IGM SERPL QL IA: NEGATIVE
HBV SURFACE AG SERPL QL IA: NEGATIVE
HCT VFR BLD AUTO: 25.7 % (ref 37–48.5)
HCV AB SERPL QL IA: NEGATIVE
HGB BLD-MCNC: 8 G/DL (ref 12–16)
IMM GRANULOCYTES # BLD AUTO: 0.03 K/UL (ref 0–0.04)
IMM GRANULOCYTES NFR BLD AUTO: 0.4 % (ref 0–0.5)
LYMPHOCYTES # BLD AUTO: 1.3 K/UL (ref 1–4.8)
LYMPHOCYTES NFR BLD: 19.1 % (ref 18–48)
MAGNESIUM SERPL-MCNC: 2.4 MG/DL (ref 1.6–2.6)
MCH RBC QN AUTO: 28.4 PG (ref 27–31)
MCHC RBC AUTO-ENTMCNC: 31.1 G/DL (ref 32–36)
MCV RBC AUTO: 91 FL (ref 82–98)
MONOCYTES # BLD AUTO: 0.9 K/UL (ref 0.3–1)
MONOCYTES NFR BLD: 13.6 % (ref 4–15)
NEUTROPHILS # BLD AUTO: 4 K/UL (ref 1.8–7.7)
NEUTROPHILS NFR BLD: 59.6 % (ref 38–73)
NRBC BLD-RTO: 0 /100 WBC
PHOSPHATE SERPL-MCNC: 4.5 MG/DL (ref 2.7–4.5)
PLATELET # BLD AUTO: 226 K/UL (ref 150–350)
PMV BLD AUTO: 10.7 FL (ref 9.2–12.9)
POTASSIUM SERPL-SCNC: 3.8 MMOL/L (ref 3.5–5.1)
RBC # BLD AUTO: 2.82 M/UL (ref 4–5.4)
SODIUM SERPL-SCNC: 128 MMOL/L (ref 136–145)
URATE SERPL-MCNC: 6.4 MG/DL (ref 2.4–5.7)
WBC # BLD AUTO: 6.7 K/UL (ref 3.9–12.7)

## 2020-02-12 PROCEDURE — 83735 ASSAY OF MAGNESIUM: CPT | Mod: HCNC

## 2020-02-12 PROCEDURE — 36415 COLL VENOUS BLD VENIPUNCTURE: CPT | Mod: HCNC

## 2020-02-12 PROCEDURE — 99900035 HC TECH TIME PER 15 MIN (STAT): Mod: HCNC

## 2020-02-12 PROCEDURE — 63600175 PHARM REV CODE 636 W HCPCS: Mod: HCNC | Performed by: INTERNAL MEDICINE

## 2020-02-12 PROCEDURE — 63600175 PHARM REV CODE 636 W HCPCS: Mod: HCNC | Performed by: NURSE PRACTITIONER

## 2020-02-12 PROCEDURE — 25000242 PHARM REV CODE 250 ALT 637 W/ HCPCS: Mod: HCNC | Performed by: NURSE PRACTITIONER

## 2020-02-12 PROCEDURE — 96376 TX/PRO/DX INJ SAME DRUG ADON: CPT

## 2020-02-12 PROCEDURE — 83540 ASSAY OF IRON: CPT | Mod: HCNC

## 2020-02-12 PROCEDURE — 94640 AIRWAY INHALATION TREATMENT: CPT | Mod: HCNC

## 2020-02-12 PROCEDURE — 80048 BASIC METABOLIC PNL TOTAL CA: CPT | Mod: HCNC

## 2020-02-12 PROCEDURE — 85025 COMPLETE CBC W/AUTO DIFF WBC: CPT | Mod: HCNC

## 2020-02-12 PROCEDURE — 90935 HEMODIALYSIS ONE EVALUATION: CPT | Mod: HCNC

## 2020-02-12 PROCEDURE — 27000221 HC OXYGEN, UP TO 24 HOURS: Mod: HCNC

## 2020-02-12 PROCEDURE — 82306 VITAMIN D 25 HYDROXY: CPT | Mod: HCNC

## 2020-02-12 PROCEDURE — 25000003 PHARM REV CODE 250: Mod: HCNC | Performed by: NURSE PRACTITIONER

## 2020-02-12 PROCEDURE — 25000003 PHARM REV CODE 250: Mod: HCNC | Performed by: FAMILY MEDICINE

## 2020-02-12 PROCEDURE — 96375 TX/PRO/DX INJ NEW DRUG ADDON: CPT

## 2020-02-12 PROCEDURE — G0378 HOSPITAL OBSERVATION PER HR: HCPCS | Mod: HCNC

## 2020-02-12 PROCEDURE — 94761 N-INVAS EAR/PLS OXIMETRY MLT: CPT | Mod: HCNC

## 2020-02-12 PROCEDURE — 84100 ASSAY OF PHOSPHORUS: CPT | Mod: HCNC

## 2020-02-12 PROCEDURE — 84550 ASSAY OF BLOOD/URIC ACID: CPT | Mod: HCNC

## 2020-02-12 RX ORDER — LIDOCAINE AND PRILOCAINE 25; 25 MG/G; MG/G
CREAM TOPICAL ONCE
Status: DISCONTINUED | OUTPATIENT
Start: 2020-02-12 | End: 2020-02-14 | Stop reason: HOSPADM

## 2020-02-12 RX ORDER — PARICALCITOL 5 UG/ML
1 INJECTION, SOLUTION INTRAVENOUS DAILY
Status: DISCONTINUED | OUTPATIENT
Start: 2020-02-12 | End: 2020-02-14 | Stop reason: HOSPADM

## 2020-02-12 RX ORDER — SODIUM CHLORIDE 9 MG/ML
INJECTION, SOLUTION INTRAVENOUS
Status: DISCONTINUED | OUTPATIENT
Start: 2020-02-12 | End: 2020-02-14 | Stop reason: HOSPADM

## 2020-02-12 RX ORDER — SODIUM CHLORIDE 9 MG/ML
INJECTION, SOLUTION INTRAVENOUS ONCE
Status: COMPLETED | OUTPATIENT
Start: 2020-02-12 | End: 2020-02-12

## 2020-02-12 RX ADMIN — CEFEPIME HYDROCHLORIDE 2 G: 2 INJECTION, POWDER, FOR SOLUTION INTRAVENOUS at 11:02

## 2020-02-12 RX ADMIN — PARICALCITOL 1 MCG: 5 INJECTION, SOLUTION INTRAVENOUS at 12:02

## 2020-02-12 RX ADMIN — BUSPIRONE HYDROCHLORIDE 5 MG: 5 TABLET ORAL at 10:02

## 2020-02-12 RX ADMIN — HYDROCODONE BITARTRATE AND ACETAMINOPHEN 1 TABLET: 10; 325 TABLET ORAL at 12:02

## 2020-02-12 RX ADMIN — SODIUM CHLORIDE 500 ML: 0.9 INJECTION, SOLUTION INTRAVENOUS at 12:02

## 2020-02-12 RX ADMIN — BUSPIRONE HYDROCHLORIDE 5 MG: 5 TABLET ORAL at 08:02

## 2020-02-12 RX ADMIN — MUPIROCIN: 20 OINTMENT TOPICAL at 08:02

## 2020-02-12 RX ADMIN — ALLOPURINOL 100 MG: 100 TABLET ORAL at 10:02

## 2020-02-12 RX ADMIN — BUSPIRONE HYDROCHLORIDE 5 MG: 5 TABLET ORAL at 03:02

## 2020-02-12 RX ADMIN — HYDROCODONE BITARTRATE AND ACETAMINOPHEN 1 TABLET: 10; 325 TABLET ORAL at 08:02

## 2020-02-12 RX ADMIN — FLUTICASONE FUROATE AND VILANTEROL TRIFENATATE 1 PUFF: 100; 25 POWDER RESPIRATORY (INHALATION) at 08:02

## 2020-02-12 RX ADMIN — EPOETIN ALFA-EPBX 10000 UNITS: 10000 INJECTION, SOLUTION INTRAVENOUS; SUBCUTANEOUS at 12:02

## 2020-02-12 RX ADMIN — HYDROCODONE BITARTRATE AND ACETAMINOPHEN 1 TABLET: 10; 325 TABLET ORAL at 06:02

## 2020-02-12 RX ADMIN — IRON SUCROSE 100 MG: 20 INJECTION, SOLUTION INTRAVENOUS at 12:02

## 2020-02-12 RX ADMIN — FUROSEMIDE 80 MG: 10 INJECTION, SOLUTION INTRAVENOUS at 08:02

## 2020-02-12 RX ADMIN — FUROSEMIDE 80 MG: 10 INJECTION, SOLUTION INTRAVENOUS at 03:02

## 2020-02-12 NOTE — PROGRESS NOTES
[facility]  Progress Note    Patient Name: Katie Quevedo  MRN: [id number]  Patient Class: OP- Observation   Admission Date: 2/10/2020  Length of Stay: 0 days  Attending Physician: Jennifer Bowles*  Primary Care Provider: Kingston Verduzco MD        Subjective:     Principal Problem:Acute on chronic respiratory failure with hypoxia        HPI:  Katie Quevedo is a 74 y.o. female with past medical history of anemia, asthma, bilateral renal cysts, chronic pain, CKD stage 5 (has not started HD), COPD on 2L home O2, dehydration, HTN, lumbar spondylosis, migraines, osteoporosis, osteoarthritis, pulmonary embolism, and seizures who presented to ED with worsening SOB and BERTRAND. Onset 3 days ago, gradually becoming worse. Patient reports associated cough/congestion and lower extremity edema. She denies chest pain and fever/chills. Reports decreased urinary output. Labs remarkable for Na 125, K 5.2, BUN/Cr 57/4.0 (previously 32/2.9 on 2/3/20),  , procalcitonin 0.31. Pt afebrile. CXR shows increase in left pleural effusion with associated compressive atelectasis and/or lower lobe consolidation. She received furosemide 80 mg, vancomycin, cefepime and azithromycin. Patient admitted to [facility].     Overview/Hospital Course:  No notes on file    Interval History: awake and alert, SOB improving , Nephrology planning for one more session tomorrow.     Review of Systems   Constitutional: Negative for chills, diaphoresis and fever.   Respiratory: Positive for cough and shortness of breath. Negative for chest tightness and wheezing.    Cardiovascular: Negative for chest pain, palpitations and leg swelling.   Gastrointestinal: Negative for abdominal pain, diarrhea and vomiting.   Genitourinary: Negative for frequency.   Musculoskeletal: Negative for myalgias.   Neurological: Negative for light-headedness.   Psychiatric/Behavioral: Negative for confusion.     Objective:     Vital  Signs (Most Recent):  Temp: 96.2 °F (35.7 °C) (02/12/20 1437)  Pulse: 77 (02/12/20 1437)  Resp: 20 (02/12/20 1437)  BP: (!) 115/55 (02/12/20 1437)  SpO2: 99 % (02/12/20 1437) Vital Signs (24h Range):  Temp:  [96 °F (35.6 °C)-97.7 °F (36.5 °C)] 96.2 °F (35.7 °C)  Pulse:  [] 77  Resp:  [16-22] 20  SpO2:  [96 %-99 %] 99 %  BP: (101-131)/(54-87) 115/55     Weight: 60.2 kg (132 lb 11.5 oz)  Body mass index is 24.08 kg/m².    Intake/Output Summary (Last 24 hours) at 2/12/2020 1538  Last data filed at 2/12/2020 1345  Gross per 24 hour   Intake 837 ml   Output 2950 ml   Net -2113 ml      Physical Exam   Constitutional: She is oriented to person, place, and time. She appears well-developed and well-nourished.   HENT:   Head: Normocephalic and atraumatic.   Neck: Neck supple. No JVD present.   Cardiovascular: Normal rate, regular rhythm, normal heart sounds and intact distal pulses.   Pulmonary/Chest: She is in respiratory distress (mild ). She has no wheezes.   Abdominal: Soft. Bowel sounds are normal. She exhibits no distension. There is no guarding.   Musculoskeletal: Normal range of motion. She exhibits no edema or tenderness.   Neurological: She is alert and oriented to person, place, and time.   Skin: Skin is warm and dry. Capillary refill takes less than 2 seconds.   Psychiatric: She has a normal mood and affect.       Significant Labs:   ABGs:   Recent Labs   Lab 02/10/20  2208   PH 7.411   PCO2 43.2   HCO3 27.5   POCSATURATED 94*   BE 3     CBC:   Recent Labs   Lab 02/10/20  1957 02/11/20  0451 02/12/20  0600   WBC 9.00 7.99 6.70   HGB 9.2* 8.2* 8.0*   HCT 29.3* 25.9* 25.7*    248 226     CMP:   Recent Labs   Lab 02/10/20  1957 02/11/20  0451 02/12/20  0600   * 123*  123* 128*   K 5.2* 4.9  4.9 3.8   CL 86* 86*  86* 95   CO2 24 26  26 22*    89  88 85   BUN 57* 56*  56* 38*   CREATININE 4.0* 3.7*  3.7* 2.6*   CALCIUM 9.3 9.0  8.9 8.7   PROT 7.9  --   --    ALBUMIN 3.9  --   --     BILITOT 0.5  --   --    ALKPHOS 160*  --   --    AST 23  --   --    ALT 10  --   --    ANIONGAP 15 11  11 11   EGFRNONAA 10* 11*  11* 17*     Coagulation:   Recent Labs   Lab 02/10/20  1957   INR 1.0     Lactic Acid: No results for input(s): LACTATE in the last 48 hours.  TSH: No results for input(s): TSH in the last 4320 hours.  Urine Culture: No results for input(s): LABURIN in the last 48 hours.  Urine Studies:   Recent Labs   Lab 02/11/20  1616   COLORU Yellow   APPEARANCEUA Clear   PHUR 6.0   SPECGRAV 1.010   PROTEINUA Negative   GLUCUA Negative   KETONESU Negative   BILIRUBINUA Negative   OCCULTUA 2+*   NITRITE Negative   UROBILINOGEN Negative   LEUKOCYTESUR Trace*   RBCUA 3   WBCUA 2     Imaging Results          X-Ray Chest AP Portable (Final result)  Result time 02/10/20 21:27:28    Final result by Stephanie Mercado MD (02/10/20 21:27:28)                 Impression:      Increase in left pleural effusion with associated compressive atelectasis and/or lower lobe consolidation.      Electronically signed by: Stephanie Mercado  Date:    02/10/2020  Time:    21:27             Narrative:    EXAMINATION:  AP PORTABLE CHEST    CLINICAL HISTORY:  CHF;    TECHNIQUE:  AP portable chest radiograph was submitted.    COMPARISON:  09/26/2019    FINDINGS:  AP portable chest radiograph demonstrates a cardiac silhouette within normal limits.  There is increase left pleural fluid with associated compressive atelectasis and/or lower lobe consolidation.  There is no pneumothorax.  The right lung appears to be grossly clear.  Mild dextroscoliosis is present.  There is incompletely imaged cervical hardware.                                Significant Imaging: I have reviewed all pertinent imaging results/findings within the past 24 hours.      Assessment/Plan:      * Acute on chronic respiratory failure with hypoxia  Pleural effusion  ? Left lower lobe pneumonia   Supplemental home O2 (usually on 2L)   Acute kidney injury  superimposed on CKD  Volume overload  Continue furosemide at home dose-  20 mg  BID, titrate as needed   Continue empiric cefepime, follow blood cultures, prn decongestants and  Nebs   Consider IR consult for thoracentesis   Continue supplemental O2         Hyponatremia  Fluid restriction      COPD (chronic obstructive pulmonary disease)  Chronic  continue Trelegy Ellipta, supplemental O2       Anemia, chronic renal failure, stage 4 (severe)  stable      Acute kidney injury superimposed on CKD  Anemia, chronic renal failure, stage 4 (severe)  Hyponatremia   Hyperkalemia     Pt has Left AV graft. Reports has not started dialysis.   Nephrology consulted. Avoid nephrotoxic meds        Essential hypertension  Diastolic dysfunction, left ventricle  Troponin negative  BNP mildly elevated  Continue amlodipine and furosemide       VTE Risk Mitigation (From admission, onward)         Ordered     IP VTE HIGH RISK PATIENT  Once      02/10/20 2134     Place sequential compression device  Until discontinued      02/10/20 2134                      Jennifer Bowles MD  Department of Hospital Medicine   [facility]

## 2020-02-12 NOTE — PLAN OF CARE
The proper method of use, as well as anticipated side effects, of this metered-dose inhaler are discussed and demonstrated to the patient. O2 saturation 99% on nasal cannula 4 lpm. Decreased to 3 lpm. Will continue to monitor.

## 2020-02-12 NOTE — NURSING
Patient arrived back to room from dialysis. Patient AAOx4 and denies any pain. VSS. Bed alarm on, bed locked and low, side rails up 2, and call bell in reach. 4L of oxygen via NC in place.

## 2020-02-12 NOTE — PROGRESS NOTES
[facility]  Progress Note    Patient Name: Katie Quevedo  MRN: [id number]  Patient Class: OP- Observation   Admission Date: 2/10/2020  Length of Stay: 0 days  Attending Physician: Jennifer Bowles*  Primary Care Provider: Kingston Verduzco MD        Subjective:     Principal Problem:Acute on chronic respiratory failure with hypoxia        HPI:  Katie Quevedo is a 74 y.o. female with past medical history of anemia, asthma, bilateral renal cysts, chronic pain, CKD stage 5 (has not started HD), COPD on 2L home O2, dehydration, HTN, lumbar spondylosis, migraines, osteoporosis, osteoarthritis, pulmonary embolism, and seizures who presented to ED with worsening SOB and BERTRAND. Onset 3 days ago, gradually becoming worse. Patient reports associated cough/congestion and lower extremity edema. She denies chest pain and fever/chills. Reports decreased urinary output. Labs remarkable for Na 125, K 5.2, BUN/Cr 57/4.0 (previously 32/2.9 on 2/3/20),  , procalcitonin 0.31. Pt afebrile. CXR shows increase in left pleural effusion with associated compressive atelectasis and/or lower lobe consolidation. She received furosemide 80 mg, vancomycin, cefepime and azithromycin. Patient admitted to [facility].     Overview/Hospital Course:  No notes on file    Interval History: awake and alert, still feeling SOB but on same home oxygen requirement- 2 l  Awaiting nephrology for HD    Review of Systems   Constitutional: Negative for chills, diaphoresis and fever.   Respiratory: Positive for cough and shortness of breath. Negative for chest tightness and wheezing.    Cardiovascular: Positive for leg swelling. Negative for chest pain and palpitations.   Gastrointestinal: Negative for abdominal pain, diarrhea, nausea and vomiting.   Genitourinary: Negative for frequency.   Musculoskeletal: Negative for back pain and myalgias.   Neurological: Negative for light-headedness and headaches.    Psychiatric/Behavioral: Negative for confusion.     Objective:     Vital Signs (Most Recent):  Temp: 98 °F (36.7 °C) (02/11/20 0707)  Pulse: 74 (02/11/20 0825)  Resp: 18 (02/11/20 0825)  BP: (!) 104/56 (02/11/20 0707)  SpO2: 96 % (02/11/20 0825) Vital Signs (24h Range):  Temp:  [97.9 °F (36.6 °C)-98.2 °F (36.8 °C)] 98 °F (36.7 °C)  Pulse:  [71-94] 74  Resp:  [17-26] 18  SpO2:  [91 %-98 %] 96 %  BP: ()/(52-68) 104/56     Weight: 59 kg (130 lb)  Body mass index is 23.59 kg/m².    Intake/Output Summary (Last 24 hours) at 2/11/2020 0858  Last data filed at 2/11/2020 0356  Gross per 24 hour   Intake 1100 ml   Output 600 ml   Net 500 ml      Physical Exam   Constitutional: She is oriented to person, place, and time. She appears well-developed and well-nourished.   HENT:   Head: Normocephalic and atraumatic.   Neck: Neck supple. No JVD present.   Cardiovascular: Normal rate, regular rhythm, normal heart sounds and intact distal pulses.   Pulmonary/Chest: She is in respiratory distress (mild ). She has no wheezes.   Abdominal: Soft. Bowel sounds are normal. She exhibits no distension. There is no guarding.   Musculoskeletal: Normal range of motion. She exhibits no edema or tenderness.   Neurological: She is alert and oriented to person, place, and time.   Skin: Skin is warm and dry. Capillary refill takes less than 2 seconds.   Psychiatric: She has a normal mood and affect.       Significant Labs:   ABGs:   Recent Labs   Lab 02/10/20  2208   PH 7.411   PCO2 43.2   HCO3 27.5   POCSATURATED 94*   BE 3     CBC:   Recent Labs   Lab 02/10/20  1957 02/11/20  0451   WBC 9.00 7.99   HGB 9.2* 8.2*   HCT 29.3* 25.9*    248     CMP:   Recent Labs   Lab 02/10/20  1957 02/11/20  0451   * 123*  123*   K 5.2* 4.9  4.9   CL 86* 86*  86*   CO2 24 26  26    89  88   BUN 57* 56*  56*   CREATININE 4.0* 3.7*  3.7*   CALCIUM 9.3 9.0  8.9   PROT 7.9  --    ALBUMIN 3.9  --    BILITOT 0.5  --    ALKPHOS 160*  --     AST 23  --    ALT 10  --    ANIONGAP 15 11  11   EGFRNONAA 10* 11*  11*     Coagulation:   Recent Labs   Lab 02/10/20  1957   INR 1.0     Lactic Acid: No results for input(s): LACTATE in the last 48 hours.  TSH: No results for input(s): TSH in the last 4320 hours.  Urine Culture: No results for input(s): LABURIN in the last 48 hours.  Urine Studies: No results for input(s): COLORU, APPEARANCEUA, PHUR, SPECGRAV, PROTEINUA, GLUCUA, KETONESU, BILIRUBINUA, OCCULTUA, NITRITE, UROBILINOGEN, LEUKOCYTESUR, RBCUA, WBCUA, BACTERIA, SQUAMEPITHEL, HYALINECASTS in the last 48 hours.    Invalid input(s): WRIGHTSUR  Imaging Results          X-Ray Chest AP Portable (Final result)  Result time 02/10/20 21:27:28    Final result by Stephanie Mercado MD (02/10/20 21:27:28)                 Impression:      Increase in left pleural effusion with associated compressive atelectasis and/or lower lobe consolidation.      Electronically signed by: Stephanie Mercado  Date:    02/10/2020  Time:    21:27             Narrative:    EXAMINATION:  AP PORTABLE CHEST    CLINICAL HISTORY:  CHF;    TECHNIQUE:  AP portable chest radiograph was submitted.    COMPARISON:  09/26/2019    FINDINGS:  AP portable chest radiograph demonstrates a cardiac silhouette within normal limits.  There is increase left pleural fluid with associated compressive atelectasis and/or lower lobe consolidation.  There is no pneumothorax.  The right lung appears to be grossly clear.  Mild dextroscoliosis is present.  There is incompletely imaged cervical hardware.                                Significant Imaging: I have reviewed all pertinent imaging results/findings within the past 24 hours.      Assessment/Plan:      * Acute on chronic respiratory failure with hypoxia  Pleural effusion  ? Left lower lobe pneumonia   Supplemental home O2 (usually on 2L)       Continue furosemide at home dose-  20 mg  BID, titrate as needed   Continue empiric cefepime, follow blood  cultures, prn decongestants and  Nebs   Consider IR consult for thoracentesis   Continue supplemental O2         Hyponatremia  Fluid restriction      COPD (chronic obstructive pulmonary disease)  Chronic  continue Trelegy Ellipta, supplemental O2       Anemia, chronic renal failure, stage 4 (severe)  stable      Acute kidney injury superimposed on CKD  Anemia, chronic renal failure, stage 4 (severe)  Hyponatremia   Hyperkalemia     Pt has Left AV graft. Reports has not started dialysis.   Nephrology consulted. Avoid nephrotoxic meds        Essential hypertension  Diastolic dysfunction, left ventricle  Troponin negative  BNP mildly elevated  Continue amlodipine and furosemide       VTE Risk Mitigation (From admission, onward)         Ordered     IP VTE HIGH RISK PATIENT  Once      02/10/20 2134     Place sequential compression device  Until discontinued      02/10/20 2134                      Jennifer Bowles MD  Department of Hospital Medicine   [facility]

## 2020-02-12 NOTE — PLAN OF CARE
Pt stable. NO distress noted. POC reviewed with pt. Pt verbalized understanding. Pt remains SR on the monitor. NO true red alarms noted.Pain med given PRN. IV Lasix administered. PT sating 96% on 4LNC. Fall precautions maintained. Bed in lowest position, call light in reach and bed alarm on.

## 2020-02-12 NOTE — SUBJECTIVE & OBJECTIVE
Interval History: awake and alert, SOB improving , Nephrology planning for one more session tomorrow.     Review of Systems   Constitutional: Negative for chills, diaphoresis and fever.   Respiratory: Positive for cough and shortness of breath. Negative for chest tightness and wheezing.    Cardiovascular: Negative for chest pain, palpitations and leg swelling.   Gastrointestinal: Negative for abdominal pain, diarrhea and vomiting.   Genitourinary: Negative for frequency.   Musculoskeletal: Negative for myalgias.   Neurological: Negative for light-headedness.   Psychiatric/Behavioral: Negative for confusion.     Objective:     Vital Signs (Most Recent):  Temp: 96.2 °F (35.7 °C) (02/12/20 1437)  Pulse: 77 (02/12/20 1437)  Resp: 20 (02/12/20 1437)  BP: (!) 115/55 (02/12/20 1437)  SpO2: 99 % (02/12/20 1437) Vital Signs (24h Range):  Temp:  [96 °F (35.6 °C)-97.7 °F (36.5 °C)] 96.2 °F (35.7 °C)  Pulse:  [] 77  Resp:  [16-22] 20  SpO2:  [96 %-99 %] 99 %  BP: (101-131)/(54-87) 115/55     Weight: 60.2 kg (132 lb 11.5 oz)  Body mass index is 24.08 kg/m².    Intake/Output Summary (Last 24 hours) at 2/12/2020 1538  Last data filed at 2/12/2020 1345  Gross per 24 hour   Intake 837 ml   Output 2950 ml   Net -2113 ml      Physical Exam   Constitutional: She is oriented to person, place, and time. She appears well-developed and well-nourished.   HENT:   Head: Normocephalic and atraumatic.   Neck: Neck supple. No JVD present.   Cardiovascular: Normal rate, regular rhythm, normal heart sounds and intact distal pulses.   Pulmonary/Chest: She is in respiratory distress (mild ). She has no wheezes.   Abdominal: Soft. Bowel sounds are normal. She exhibits no distension. There is no guarding.   Musculoskeletal: Normal range of motion. She exhibits no edema or tenderness.   Neurological: She is alert and oriented to person, place, and time.   Skin: Skin is warm and dry. Capillary refill takes less than 2 seconds.   Psychiatric: She  has a normal mood and affect.       Significant Labs:   ABGs:   Recent Labs   Lab 02/10/20  2208   PH 7.411   PCO2 43.2   HCO3 27.5   POCSATURATED 94*   BE 3     CBC:   Recent Labs   Lab 02/10/20  1957 02/11/20  0451 02/12/20  0600   WBC 9.00 7.99 6.70   HGB 9.2* 8.2* 8.0*   HCT 29.3* 25.9* 25.7*    248 226     CMP:   Recent Labs   Lab 02/10/20  1957 02/11/20  0451 02/12/20  0600   * 123*  123* 128*   K 5.2* 4.9  4.9 3.8   CL 86* 86*  86* 95   CO2 24 26  26 22*    89  88 85   BUN 57* 56*  56* 38*   CREATININE 4.0* 3.7*  3.7* 2.6*   CALCIUM 9.3 9.0  8.9 8.7   PROT 7.9  --   --    ALBUMIN 3.9  --   --    BILITOT 0.5  --   --    ALKPHOS 160*  --   --    AST 23  --   --    ALT 10  --   --    ANIONGAP 15 11  11 11   EGFRNONAA 10* 11*  11* 17*     Coagulation:   Recent Labs   Lab 02/10/20  1957   INR 1.0     Lactic Acid: No results for input(s): LACTATE in the last 48 hours.  TSH: No results for input(s): TSH in the last 4320 hours.  Urine Culture: No results for input(s): LABURIN in the last 48 hours.  Urine Studies:   Recent Labs   Lab 02/11/20  1616   COLORU Yellow   APPEARANCEUA Clear   PHUR 6.0   SPECGRAV 1.010   PROTEINUA Negative   GLUCUA Negative   KETONESU Negative   BILIRUBINUA Negative   OCCULTUA 2+*   NITRITE Negative   UROBILINOGEN Negative   LEUKOCYTESUR Trace*   RBCUA 3   WBCUA 2     Imaging Results          X-Ray Chest AP Portable (Final result)  Result time 02/10/20 21:27:28    Final result by Stephanie Mercado MD (02/10/20 21:27:28)                 Impression:      Increase in left pleural effusion with associated compressive atelectasis and/or lower lobe consolidation.      Electronically signed by: Stephanie Mercado  Date:    02/10/2020  Time:    21:27             Narrative:    EXAMINATION:  AP PORTABLE CHEST    CLINICAL HISTORY:  CHF;    TECHNIQUE:  AP portable chest radiograph was submitted.    COMPARISON:  09/26/2019    FINDINGS:  AP portable chest radiograph  demonstrates a cardiac silhouette within normal limits.  There is increase left pleural fluid with associated compressive atelectasis and/or lower lobe consolidation.  There is no pneumothorax.  The right lung appears to be grossly clear.  Mild dextroscoliosis is present.  There is incompletely imaged cervical hardware.                                Significant Imaging: I have reviewed all pertinent imaging results/findings within the past 24 hours.

## 2020-02-12 NOTE — ASSESSMENT & PLAN NOTE
Pleural effusion  ? Left lower lobe pneumonia   Supplemental home O2 (usually on 2L)   Acute kidney injury superimposed on CKD  Volume overload  Continue furosemide at home dose-  20 mg  BID, titrate as needed   Continue empiric cefepime, follow blood cultures, prn decongestants and  Nebs   Consider IR consult for thoracentesis   Continue supplemental O2

## 2020-02-12 NOTE — PLAN OF CARE
POC reviewed with patient and family. Patient AAOx4 and denies any pain. Tele monitor in place reading NSR. No red alarms or ectopy noted. Dialysis completed removing 2L of fluid. 4L of oxygen via NC in place. Bed alarm on, bed locked and low, side rails up x2, and call bell in reach. Patient and family verbalize clear understanding of POC.

## 2020-02-12 NOTE — PLAN OF CARE
I spoke with pt and her spouse Dre [phone] at her bedside.  Pt was drowsy.  Dre states pt received her first dialysis here on Monday 2/10/20 when she came to the ER.  I thought pt had outpatient dialysis chair established.  I called Dr. Aura Diggs and she thinks the paperwork has been started with [facility].  I called [facility] and spoke with Coty.  She says the  is gone for the day.  The  has access to pt's who have started the process to establish a dialysis chair.  The  will be back in the morning and can look up pt at that time.  Coty states she will have a time on the 3rd shift MWF for pt once she gets all of pt's information.  I will follow-up in the morning.      White board updated with CM name and contact information.  Discharge brochure provided.  Pt encouraged to call with any questions or concerns.  Cm will continue to follow pt through transitions of care and assist with any discharge needs.       02/12/20 4136   Discharge Assessment   Assessment Type Discharge Planning Assessment   Confirmed/corrected address and phone number on facesheet? Yes   Assessment information obtained from? Patient;Caregiver   Communicated expected length of stay with patient/caregiver yes   Prior to hospitilization cognitive status: Alert/Oriented   Prior to hospitalization functional status: Assistive Equipment   Current cognitive status: Alert/Oriented   Current Functional Status: Assistive Equipment   Facility Arrived From: home   Lives With spouse   Able to Return to Prior Arrangements yes   Who are your caregiver(s) and their phone number(s)? Dre Quevedo [phone]   Patient's perception of discharge disposition home or selfcare;other (comments)   Readmission Within the Last 30 Days no previous admission in last 30 days   Patient currently being followed by outpatient case management? No   Patient currently receives any other outside agency services? No   Equipment  Currently Used at Home cane, straight;power chair;oxygen;walker, rolling   Do you have any problems affording any of your prescribed medications? No   Is the patient taking medications as prescribed? yes   Does the patient have transportation home? Yes   Transportation Anticipated family or friend will provide   Discharge Plan A Home with family   Discharge Plan B Home Health;Home with family   DME Needed Upon Discharge  none   Patient/Family in Agreement with Plan yes   Readmission Questionnaire   At the time of your discharge, did someone talk to you about what your health problems were? Yes   At the time of discharge, did someone talk to you about what to watch out for regarding worsening of your health problem? Yes   At the time of discharge, did someone talk to you about what to do if you experienced worsening of your health problem? Yes   At the time of discharge, did someone talk to you about which medication to take when you left the hospital and which ones to stop taking? Yes   At the time of discharge, did someone talk to you about when and where to follow up with a doctor after you left the hospital? Yes   How often do you need to have someone help you when you read instructions, pamphlets, or other written material from your doctor or pharmacy? Always  (spouse Dre Quevedo)   Do you have problems taking your medications as prescribed? No   Do you have any problems affording any of  your prescribed medications? No   Do you have problems obtaining/receiving your medications? No   Does the patient have transportation to healthcare appointments? Yes   Living Arrangements house   Does the patient have family/friends to help with healtcare needs after discharge? yes   Does your caregiver provide all the help you need? Yes     Darrian Guerrero RN,   [phone]

## 2020-02-12 NOTE — ASSESSMENT & PLAN NOTE
Diastolic dysfunction, left ventricle  Troponin negative  BNP mildly elevated  Continue amlodipine and furosemide

## 2020-02-13 LAB
25(OH)D3+25(OH)D2 SERPL-MCNC: 26 NG/ML (ref 30–96)
ANION GAP SERPL CALC-SCNC: 12 MMOL/L (ref 8–16)
BASOPHILS # BLD AUTO: 0.09 K/UL (ref 0–0.2)
BASOPHILS NFR BLD: 0.9 % (ref 0–1.9)
BUN SERPL-MCNC: 25 MG/DL (ref 8–23)
CALCIUM SERPL-MCNC: 9.2 MG/DL (ref 8.7–10.5)
CHLORIDE SERPL-SCNC: 98 MMOL/L (ref 95–110)
CO2 SERPL-SCNC: 22 MMOL/L (ref 23–29)
CREAT SERPL-MCNC: 2.2 MG/DL (ref 0.5–1.4)
DIFFERENTIAL METHOD: ABNORMAL
EOSINOPHIL # BLD AUTO: 0.5 K/UL (ref 0–0.5)
EOSINOPHIL NFR BLD: 5.3 % (ref 0–8)
ERYTHROCYTE [DISTWIDTH] IN BLOOD BY AUTOMATED COUNT: 15.5 % (ref 11.5–14.5)
EST. GFR  (AFRICAN AMERICAN): 24 ML/MIN/1.73 M^2
EST. GFR  (NON AFRICAN AMERICAN): 21 ML/MIN/1.73 M^2
GLUCOSE SERPL-MCNC: 80 MG/DL (ref 70–110)
HCT VFR BLD AUTO: 27.5 % (ref 37–48.5)
HGB BLD-MCNC: 8.5 G/DL (ref 12–16)
IMM GRANULOCYTES # BLD AUTO: 0.08 K/UL (ref 0–0.04)
IMM GRANULOCYTES NFR BLD AUTO: 0.8 % (ref 0–0.5)
IRON SERPL-MCNC: 13 UG/DL (ref 30–160)
L PNEUMO AG UR QL IA: NOT DETECTED
LYMPHOCYTES # BLD AUTO: 1.2 K/UL (ref 1–4.8)
LYMPHOCYTES NFR BLD: 12.9 % (ref 18–48)
MAGNESIUM SERPL-MCNC: 2.1 MG/DL (ref 1.6–2.6)
MCH RBC QN AUTO: 28.3 PG (ref 27–31)
MCHC RBC AUTO-ENTMCNC: 30.9 G/DL (ref 32–36)
MCV RBC AUTO: 92 FL (ref 82–98)
MONOCYTES # BLD AUTO: 1.2 K/UL (ref 0.3–1)
MONOCYTES NFR BLD: 12.2 % (ref 4–15)
NEUTROPHILS # BLD AUTO: 6.4 K/UL (ref 1.8–7.7)
NEUTROPHILS NFR BLD: 67.9 % (ref 38–73)
NRBC BLD-RTO: 0 /100 WBC
PHOSPHATE SERPL-MCNC: 3.6 MG/DL (ref 2.7–4.5)
PLATELET # BLD AUTO: 263 K/UL (ref 150–350)
PMV BLD AUTO: 10.6 FL (ref 9.2–12.9)
POTASSIUM SERPL-SCNC: 3.6 MMOL/L (ref 3.5–5.1)
RBC # BLD AUTO: 3 M/UL (ref 4–5.4)
SATURATED IRON: 3 % (ref 20–50)
SODIUM SERPL-SCNC: 132 MMOL/L (ref 136–145)
TOTAL IRON BINDING CAPACITY: 462 UG/DL (ref 250–450)
TRANSFERRIN SERPL-MCNC: 312 MG/DL (ref 200–375)
WBC # BLD AUTO: 9.5 K/UL (ref 3.9–12.7)

## 2020-02-13 PROCEDURE — 80048 BASIC METABOLIC PNL TOTAL CA: CPT | Mod: HCNC

## 2020-02-13 PROCEDURE — 27000221 HC OXYGEN, UP TO 24 HOURS: Mod: HCNC

## 2020-02-13 PROCEDURE — 87902 NFCT AGT GNTYP ALYS HEP C: CPT | Mod: HCNC

## 2020-02-13 PROCEDURE — 84100 ASSAY OF PHOSPHORUS: CPT | Mod: HCNC

## 2020-02-13 PROCEDURE — 36415 COLL VENOUS BLD VENIPUNCTURE: CPT | Mod: HCNC

## 2020-02-13 PROCEDURE — 86803 HEPATITIS C AB TEST: CPT | Mod: HCNC

## 2020-02-13 PROCEDURE — 94640 AIRWAY INHALATION TREATMENT: CPT | Mod: HCNC

## 2020-02-13 PROCEDURE — 25000003 PHARM REV CODE 250: Mod: HCNC | Performed by: FAMILY MEDICINE

## 2020-02-13 PROCEDURE — 63600175 PHARM REV CODE 636 W HCPCS: Mod: HCNC | Performed by: NURSE PRACTITIONER

## 2020-02-13 PROCEDURE — 83735 ASSAY OF MAGNESIUM: CPT | Mod: HCNC

## 2020-02-13 PROCEDURE — 94761 N-INVAS EAR/PLS OXIMETRY MLT: CPT | Mod: HCNC

## 2020-02-13 PROCEDURE — 25000003 PHARM REV CODE 250: Mod: HCNC | Performed by: NURSE PRACTITIONER

## 2020-02-13 PROCEDURE — 87340 HEPATITIS B SURFACE AG IA: CPT | Mod: HCNC

## 2020-02-13 PROCEDURE — G0378 HOSPITAL OBSERVATION PER HR: HCPCS | Mod: HCNC

## 2020-02-13 PROCEDURE — 90935 HEMODIALYSIS ONE EVALUATION: CPT | Mod: HCNC

## 2020-02-13 PROCEDURE — 85025 COMPLETE CBC W/AUTO DIFF WBC: CPT | Mod: HCNC

## 2020-02-13 PROCEDURE — 86706 HEP B SURFACE ANTIBODY: CPT | Mod: HCNC

## 2020-02-13 PROCEDURE — 96376 TX/PRO/DX INJ SAME DRUG ADON: CPT

## 2020-02-13 PROCEDURE — 86709 HEPATITIS A IGM ANTIBODY: CPT | Mod: HCNC

## 2020-02-13 PROCEDURE — 63600175 PHARM REV CODE 636 W HCPCS: Mod: HCNC | Performed by: INTERNAL MEDICINE

## 2020-02-13 PROCEDURE — 87522 HEPATITIS C REVRS TRNSCRPJ: CPT | Mod: HCNC

## 2020-02-13 PROCEDURE — 25000242 PHARM REV CODE 250 ALT 637 W/ HCPCS: Mod: HCNC | Performed by: NURSE PRACTITIONER

## 2020-02-13 RX ORDER — IPRATROPIUM BROMIDE AND ALBUTEROL SULFATE 2.5; .5 MG/3ML; MG/3ML
3 SOLUTION RESPIRATORY (INHALATION) EVERY 4 HOURS PRN
Status: DISCONTINUED | OUTPATIENT
Start: 2020-02-13 | End: 2020-02-14 | Stop reason: HOSPADM

## 2020-02-13 RX ADMIN — EPOETIN ALFA-EPBX 20000 UNITS: 10000 INJECTION, SOLUTION INTRAVENOUS; SUBCUTANEOUS at 03:02

## 2020-02-13 RX ADMIN — HYDROCODONE BITARTRATE AND ACETAMINOPHEN 1 TABLET: 10; 325 TABLET ORAL at 05:02

## 2020-02-13 RX ADMIN — MUPIROCIN: 20 OINTMENT TOPICAL at 11:02

## 2020-02-13 RX ADMIN — PARICALCITOL 1 MCG: 5 INJECTION, SOLUTION INTRAVENOUS at 03:02

## 2020-02-13 RX ADMIN — IPRATROPIUM BROMIDE AND ALBUTEROL SULFATE 3 ML: .5; 3 SOLUTION RESPIRATORY (INHALATION) at 08:02

## 2020-02-13 RX ADMIN — BUSPIRONE HYDROCHLORIDE 5 MG: 5 TABLET ORAL at 05:02

## 2020-02-13 RX ADMIN — FLUTICASONE FUROATE AND VILANTEROL TRIFENATATE 1 PUFF: 100; 25 POWDER RESPIRATORY (INHALATION) at 09:02

## 2020-02-13 RX ADMIN — BUSPIRONE HYDROCHLORIDE 5 MG: 5 TABLET ORAL at 11:02

## 2020-02-13 RX ADMIN — HYDROCODONE BITARTRATE AND ACETAMINOPHEN 1 TABLET: 10; 325 TABLET ORAL at 06:02

## 2020-02-13 RX ADMIN — FUROSEMIDE 80 MG: 10 INJECTION, SOLUTION INTRAVENOUS at 05:02

## 2020-02-13 RX ADMIN — MUPIROCIN: 20 OINTMENT TOPICAL at 08:02

## 2020-02-13 RX ADMIN — TRAZODONE HYDROCHLORIDE 100 MG: 100 TABLET ORAL at 08:02

## 2020-02-13 RX ADMIN — ALLOPURINOL 100 MG: 100 TABLET ORAL at 11:02

## 2020-02-13 RX ADMIN — FUROSEMIDE 80 MG: 10 INJECTION, SOLUTION INTRAVENOUS at 11:02

## 2020-02-13 RX ADMIN — IRON SUCROSE 100 MG: 20 INJECTION, SOLUTION INTRAVENOUS at 11:02

## 2020-02-13 RX ADMIN — CEFEPIME HYDROCHLORIDE 2 G: 2 INJECTION, POWDER, FOR SOLUTION INTRAVENOUS at 10:02

## 2020-02-13 NOTE — PLAN OF CARE
The proper method of use, as well as anticipated side effects, of this metered-dose inhaler are discussed and demonstrated to the patient. O2 saturation 96% on nasal cannula 2 lpm. Will continue to monitor.

## 2020-02-13 NOTE — PLAN OF CARE
I called [facility] and the dietician answered.  She says the  is out today and she will have to call the  to inquire about pt.    10:07  Rebecca, facility dietician, called back and left me a VM.  She says the FA told her that a lot of information is missing for the pt.  She did not specify the information missing in the VM.  I am waiting on a call back to let me know if the HD chair lab panel has been done.         02/13/20 1006   Post-Acute Status   Post-Acute Authorization Dialysis     Darrian Guerrero RN, CM  [phone]

## 2020-02-13 NOTE — PLAN OF CARE
Jason called from [facility] and says the pt has a chair time MWF 2:45pm.  I notified Dr. Don and she will go to dialysis tomorrow inpatient and go to dialysis Monday at [facility].  Gavin is faxing me the chair time letter.     02/13/20 1632   Post-Acute Status   Post-Acute Authorization Dialysis     Darrian Guerrero RN, CM  [phone]

## 2020-02-13 NOTE — PLAN OF CARE
VN rounds:  VN cued into pt's room with pt's permission.  Pt resting in bed in low position with call bell at side, bed alarm in place. Fall risk protocol discussed with pt.  VN instructed to call for assistance.  Pt aware and agreeable.   No acute distress noted.  Pt's chart, labs and vital signs reviewed.  Allowed time for questions.  Will continue to be available and intervene as needed.

## 2020-02-13 NOTE — PLAN OF CARE
Filiberto called from [facility] and says they do not have needed labs or flowsheets.  I called Corrie and requested ppd, HD chair labs to include Hep B antibody, antigen and core antibody along with Hep C.  Corrie is entering labs order now.    [facility] [phone].         02/13/20 1152   Post-Acute Status   Post-Acute Authorization Dialysis     Darrian Guerrero RN,   [phone]

## 2020-02-13 NOTE — PLAN OF CARE
Plan of care reviewed with patient.  Patient verbalized understanding.  Pt on 3L NC.  NSR on tele. Bed in lowest position call bell in reach. Will continue to monitor patient.

## 2020-02-14 ENCOUNTER — TELEPHONE (OUTPATIENT)
Dept: FAMILY MEDICINE | Facility: CLINIC | Age: 75
End: 2020-02-14

## 2020-02-14 VITALS
TEMPERATURE: 98 F | HEIGHT: 62 IN | WEIGHT: 132.69 LBS | RESPIRATION RATE: 20 BRPM | OXYGEN SATURATION: 99 % | BODY MASS INDEX: 24.42 KG/M2 | DIASTOLIC BLOOD PRESSURE: 67 MMHG | HEART RATE: 100 BPM | SYSTOLIC BLOOD PRESSURE: 102 MMHG

## 2020-02-14 LAB
ANION GAP SERPL CALC-SCNC: 8 MMOL/L (ref 8–16)
BASOPHILS # BLD AUTO: 0.08 K/UL (ref 0–0.2)
BASOPHILS NFR BLD: 0.7 % (ref 0–1.9)
BUN SERPL-MCNC: 16 MG/DL (ref 8–23)
CALCIUM SERPL-MCNC: 8.9 MG/DL (ref 8.7–10.5)
CHLORIDE SERPL-SCNC: 103 MMOL/L (ref 95–110)
CO2 SERPL-SCNC: 20 MMOL/L (ref 23–29)
CREAT SERPL-MCNC: 2.1 MG/DL (ref 0.5–1.4)
DIFFERENTIAL METHOD: ABNORMAL
EOSINOPHIL # BLD AUTO: 0.7 K/UL (ref 0–0.5)
EOSINOPHIL NFR BLD: 6 % (ref 0–8)
ERYTHROCYTE [DISTWIDTH] IN BLOOD BY AUTOMATED COUNT: 15.7 % (ref 11.5–14.5)
EST. GFR  (AFRICAN AMERICAN): 26 ML/MIN/1.73 M^2
EST. GFR  (NON AFRICAN AMERICAN): 22 ML/MIN/1.73 M^2
GLUCOSE SERPL-MCNC: 88 MG/DL (ref 70–110)
HAV IGM SERPL QL IA: NEGATIVE
HBV SURFACE AB SER QL IA: NEGATIVE
HBV SURFACE AB SER-ACNC: NEGATIVE M[IU]/ML
HBV SURFACE AB SERPL IA-ACNC: <3 MIU/ML
HBV SURFACE AG SERPL QL IA: NEGATIVE
HCT VFR BLD AUTO: 27.8 % (ref 37–48.5)
HCV AB SERPL QL IA: NEGATIVE
HGB BLD-MCNC: 8.3 G/DL (ref 12–16)
IMM GRANULOCYTES # BLD AUTO: 0.16 K/UL (ref 0–0.04)
IMM GRANULOCYTES NFR BLD AUTO: 1.5 % (ref 0–0.5)
LYMPHOCYTES # BLD AUTO: 1.7 K/UL (ref 1–4.8)
LYMPHOCYTES NFR BLD: 16.1 % (ref 18–48)
MAGNESIUM SERPL-MCNC: 2.1 MG/DL (ref 1.6–2.6)
MCH RBC QN AUTO: 28.3 PG (ref 27–31)
MCHC RBC AUTO-ENTMCNC: 29.9 G/DL (ref 32–36)
MCV RBC AUTO: 95 FL (ref 82–98)
MONOCYTES # BLD AUTO: 1.4 K/UL (ref 0.3–1)
MONOCYTES NFR BLD: 12.9 % (ref 4–15)
NEUTROPHILS # BLD AUTO: 6.8 K/UL (ref 1.8–7.7)
NEUTROPHILS NFR BLD: 62.8 % (ref 38–73)
NRBC BLD-RTO: 0 /100 WBC
PHOSPHATE SERPL-MCNC: 3.4 MG/DL (ref 2.7–4.5)
PLATELET # BLD AUTO: 214 K/UL (ref 150–350)
PMV BLD AUTO: 10.2 FL (ref 9.2–12.9)
POTASSIUM SERPL-SCNC: 3.4 MMOL/L (ref 3.5–5.1)
RBC # BLD AUTO: 2.93 M/UL (ref 4–5.4)
SODIUM SERPL-SCNC: 131 MMOL/L (ref 136–145)
TB INDURATION 48 - 72 HR READ: 0 MM
WBC # BLD AUTO: 10.77 K/UL (ref 3.9–12.7)

## 2020-02-14 PROCEDURE — 80048 BASIC METABOLIC PNL TOTAL CA: CPT | Mod: HCNC

## 2020-02-14 PROCEDURE — 63600175 PHARM REV CODE 636 W HCPCS: Mod: HCNC | Performed by: INTERNAL MEDICINE

## 2020-02-14 PROCEDURE — 36415 COLL VENOUS BLD VENIPUNCTURE: CPT | Mod: HCNC

## 2020-02-14 PROCEDURE — 90935 HEMODIALYSIS ONE EVALUATION: CPT | Mod: HCNC

## 2020-02-14 PROCEDURE — 96376 TX/PRO/DX INJ SAME DRUG ADON: CPT | Mod: 59

## 2020-02-14 PROCEDURE — 83735 ASSAY OF MAGNESIUM: CPT | Mod: HCNC

## 2020-02-14 PROCEDURE — G0378 HOSPITAL OBSERVATION PER HR: HCPCS | Mod: HCNC

## 2020-02-14 PROCEDURE — 25000003 PHARM REV CODE 250: Mod: HCNC | Performed by: NURSE PRACTITIONER

## 2020-02-14 PROCEDURE — 84100 ASSAY OF PHOSPHORUS: CPT | Mod: HCNC

## 2020-02-14 PROCEDURE — 85025 COMPLETE CBC W/AUTO DIFF WBC: CPT | Mod: HCNC

## 2020-02-14 PROCEDURE — 25000003 PHARM REV CODE 250: Mod: HCNC | Performed by: FAMILY MEDICINE

## 2020-02-14 RX ORDER — SODIUM CHLORIDE 9 MG/ML
INJECTION, SOLUTION INTRAVENOUS
Status: DISCONTINUED | OUTPATIENT
Start: 2020-02-14 | End: 2020-02-14 | Stop reason: HOSPADM

## 2020-02-14 RX ORDER — SODIUM CHLORIDE 9 MG/ML
INJECTION, SOLUTION INTRAVENOUS ONCE
Status: COMPLETED | OUTPATIENT
Start: 2020-02-14 | End: 2020-02-14

## 2020-02-14 RX ORDER — MUPIROCIN 20 MG/G
OINTMENT TOPICAL 2 TIMES DAILY
Qty: 22 G | Refills: 0 | Status: SHIPPED | OUTPATIENT
Start: 2020-02-14 | End: 2020-11-09

## 2020-02-14 RX ORDER — PARICALCITOL 5 UG/ML
8 INJECTION, SOLUTION INTRAVENOUS
Status: COMPLETED | OUTPATIENT
Start: 2020-02-14 | End: 2020-02-14

## 2020-02-14 RX ORDER — PARICALCITOL 5 UG/ML
1 INJECTION, SOLUTION INTRAVENOUS
Status: DISCONTINUED | OUTPATIENT
Start: 2020-02-14 | End: 2020-02-14

## 2020-02-14 RX ORDER — ALLOPURINOL 100 MG/1
100 TABLET ORAL
Status: DISCONTINUED | OUTPATIENT
Start: 2020-02-15 | End: 2020-02-14 | Stop reason: HOSPADM

## 2020-02-14 RX ADMIN — FUROSEMIDE 80 MG: 10 INJECTION, SOLUTION INTRAVENOUS at 08:02

## 2020-02-14 RX ADMIN — MUPIROCIN: 20 OINTMENT TOPICAL at 11:02

## 2020-02-14 RX ADMIN — ALLOPURINOL 100 MG: 100 TABLET ORAL at 08:02

## 2020-02-14 RX ADMIN — EPOETIN ALFA-EPBX 20000 UNITS: 10000 INJECTION, SOLUTION INTRAVENOUS; SUBCUTANEOUS at 11:02

## 2020-02-14 RX ADMIN — HYDROCODONE BITARTRATE AND ACETAMINOPHEN 1 TABLET: 10; 325 TABLET ORAL at 11:02

## 2020-02-14 RX ADMIN — BUSPIRONE HYDROCHLORIDE 5 MG: 5 TABLET ORAL at 04:02

## 2020-02-14 RX ADMIN — BUSPIRONE HYDROCHLORIDE 5 MG: 5 TABLET ORAL at 08:02

## 2020-02-14 RX ADMIN — IRON SUCROSE 100 MG: 20 INJECTION, SOLUTION INTRAVENOUS at 11:02

## 2020-02-14 RX ADMIN — SODIUM CHLORIDE: 0.9 INJECTION, SOLUTION INTRAVENOUS at 11:02

## 2020-02-14 RX ADMIN — PARICALCITOL 8 MCG: 5 INJECTION, SOLUTION INTRAVENOUS at 11:02

## 2020-02-14 NOTE — PROGRESS NOTES
[facility]  Progress Note    Patient Name: Katie Quevedo  MRN: [id number]  Patient Class: OP- Observation   Admission Date: 2/10/2020  Length of Stay: 0 days  Attending Physician: Jennifer Bowles*  Primary Care Provider: Kingston Verduzco MD        Subjective:     Principal Problem:Acute on chronic respiratory failure with hypoxia        HPI:  Katie Quevedo is a 74 y.o. female with past medical history of anemia, asthma, bilateral renal cysts, chronic pain, CKD stage 5 (has not started HD), COPD on 2L home O2, dehydration, HTN, lumbar spondylosis, migraines, osteoporosis, osteoarthritis, pulmonary embolism, and seizures who presented to ED with worsening SOB and BERTRAND. Onset 3 days ago, gradually becoming worse. Patient reports associated cough/congestion and lower extremity edema. She denies chest pain and fever/chills. Reports decreased urinary output. Labs remarkable for Na 125, K 5.2, BUN/Cr 57/4.0 (previously 32/2.9 on 2/3/20),  , procalcitonin 0.31. Pt afebrile. CXR shows increase in left pleural effusion with associated compressive atelectasis and/or lower lobe consolidation. She received furosemide 80 mg, vancomycin, cefepime and azithromycin. Patient admitted to [facility].     Overview/Hospital Course:  No notes on file    Interval History: awake and alert, no new complaint today.   Plan for discharge after HD today     Review of Systems   Constitutional: Negative for chills, diaphoresis and fever.   Respiratory: Negative for cough, chest tightness, shortness of breath and wheezing.    Cardiovascular: Negative for chest pain, palpitations and leg swelling.   Gastrointestinal: Negative for abdominal pain, diarrhea and vomiting.   Genitourinary: Negative for frequency.   Musculoskeletal: Negative for myalgias.   Neurological: Negative for light-headedness.   Psychiatric/Behavioral: Negative for confusion.     Objective:     Vital Signs (Most  Recent):  Temp: 97.8 °F (36.6 °C) (02/14/20 0911)  Pulse: (!) 115 (02/14/20 1204)  Resp: 20 (02/14/20 0911)  BP: 130/88 (02/14/20 0911)  SpO2: 99 % (02/14/20 0808) Vital Signs (24h Range):  Temp:  [96 °F (35.6 °C)-98 °F (36.7 °C)] 97.8 °F (36.6 °C)  Pulse:  [] 115  Resp:  [16-20] 20  SpO2:  [88 %-99 %] 99 %  BP: ()/() 130/88     Weight: 60.2 kg (132 lb 11.5 oz)  Body mass index is 24.08 kg/m².    Intake/Output Summary (Last 24 hours) at 2/14/2020 1222  Last data filed at 2/14/2020 0600  Gross per 24 hour   Intake 975 ml   Output 1500 ml   Net -525 ml      Physical Exam   Constitutional: She is oriented to person, place, and time. She appears well-developed and well-nourished.   HENT:   Head: Normocephalic and atraumatic.   Neck: Neck supple. No JVD present.   Cardiovascular: Normal rate, regular rhythm, normal heart sounds and intact distal pulses.   Pulmonary/Chest: She is in respiratory distress (mild ). She has no wheezes.   Abdominal: Soft. Bowel sounds are normal. She exhibits no distension. There is no guarding.   Musculoskeletal: Normal range of motion. She exhibits no edema or tenderness.   Neurological: She is alert and oriented to person, place, and time.   Skin: Skin is warm and dry. Capillary refill takes less than 2 seconds.   Psychiatric: She has a normal mood and affect.       Significant Labs:   ABGs:   No results for input(s): PH, PCO2, HCO3, POCSATURATED, BE, TOTALHB, COHB, METHB, O2HB, POCFIO2 in the last 48 hours.  CBC:   Recent Labs   Lab 02/13/20  0711 02/14/20  0556   WBC 9.50 10.77   HGB 8.5* 8.3*   HCT 27.5* 27.8*    214     CMP:   Recent Labs   Lab 02/13/20  0711 02/14/20  0556   * 131*   K 3.6 3.4*   CL 98 103   CO2 22* 20*   GLU 80 88   BUN 25* 16   CREATININE 2.2* 2.1*   CALCIUM 9.2 8.9   ANIONGAP 12 8   EGFRNONAA 21* 22*     Coagulation:   No results for input(s): PT, INR, APTT in the last 48 hours.  Lactic Acid: No results for input(s): LACTATE in the  last 48 hours.  TSH: No results for input(s): TSH in the last 4320 hours.  Urine Culture: No results for input(s): LABURIN in the last 48 hours.  Urine Studies:   No results for input(s): COLORU, APPEARANCEUA, PHUR, SPECGRAV, PROTEINUA, GLUCUA, KETONESU, BILIRUBINUA, OCCULTUA, NITRITE, UROBILINOGEN, LEUKOCYTESUR, RBCUA, WBCUA, BACTERIA, SQUAMEPITHEL, HYALINECASTS in the last 48 hours.    Invalid input(s): WRIGHTSUR  Imaging Results          X-Ray Chest AP Portable (Final result)  Result time 02/10/20 21:27:28    Final result by Stephanie Mercado MD (02/10/20 21:27:28)                 Impression:      Increase in left pleural effusion with associated compressive atelectasis and/or lower lobe consolidation.      Electronically signed by: Stephanie Mercado  Date:    02/10/2020  Time:    21:27             Narrative:    EXAMINATION:  AP PORTABLE CHEST    CLINICAL HISTORY:  CHF;    TECHNIQUE:  AP portable chest radiograph was submitted.    COMPARISON:  09/26/2019    FINDINGS:  AP portable chest radiograph demonstrates a cardiac silhouette within normal limits.  There is increase left pleural fluid with associated compressive atelectasis and/or lower lobe consolidation.  There is no pneumothorax.  The right lung appears to be grossly clear.  Mild dextroscoliosis is present.  There is incompletely imaged cervical hardware.                                Significant Imaging: I have reviewed all pertinent imaging results/findings within the past 24 hours.      Assessment/Plan:      * Acute on chronic respiratory failure with hypoxia  Pleural effusion  ? Left lower lobe pneumonia   Supplemental home O2 (usually on 2L)   Acute kidney injury superimposed on CKD  Volume overload  Continue furosemide at home dose-  20 mg  BID, titrate as needed   Continue empiric cefepime, follow blood cultures, prn decongestants and  Nebs   Consider IR consult for thoracentesis   Continue supplemental O2         Hyponatremia  Fluid  restriction      COPD (chronic obstructive pulmonary disease)  Chronic  continue Trelegy Ellipta, supplemental O2       Anemia, chronic renal failure, stage 4 (severe)  stable      Acute kidney injury superimposed on CKD  Anemia, chronic renal failure, stage 4 (severe)  Hyponatremia   Hyperkalemia     Pt has Left AV graft. Reports has not started dialysis.   Nephrology consulted. Avoid nephrotoxic meds        Essential hypertension  Diastolic dysfunction, left ventricle  Troponin negative  BNP mildly elevated  Continue amlodipine and furosemide       VTE Risk Mitigation (From admission, onward)         Ordered     IP VTE HIGH RISK PATIENT  Once      02/10/20 2134     Place sequential compression device  Until discontinued      02/10/20 2134                      Jennifer Bowles MD  Department of Hospital Medicine   [facility]

## 2020-02-14 NOTE — ASSESSMENT & PLAN NOTE
Pleural effusion  ? Left lower lobe pneumonia   Supplemental home O2 (usually on 2L)   Acute kidney injury superimposed on CKD  Volume overload  Continue furosemide at home dose-  20 mg  BID, titrate as needed   Continue empiric cefepime, follow blood cultures, prn decongestants and  Nebs   Consider IR consult for thoracentesis- improved  Continue supplemental O2

## 2020-02-14 NOTE — SUBJECTIVE & OBJECTIVE
Interval History: awake and alert, SOB improving, s/p 3 session of HD planning for d/c tomorrow.     Review of Systems   Constitutional: Negative for chills, diaphoresis and fever.   Respiratory: Negative for cough, chest tightness, shortness of breath and wheezing.    Cardiovascular: Negative for chest pain, palpitations and leg swelling.   Gastrointestinal: Negative for abdominal pain, diarrhea and vomiting.   Genitourinary: Negative for frequency.   Musculoskeletal: Negative for myalgias.   Neurological: Negative for light-headedness.   Psychiatric/Behavioral: Negative for confusion.     Objective:     Vital Signs (Most Recent):  Temp: 98 °F (36.7 °C) (02/13/20 1250)  Pulse: 95 (02/13/20 1630)  Resp: 18 (02/13/20 1250)  BP: 114/67 (02/13/20 1630)  SpO2: (patient not in room at this time) (02/13/20 1651) Vital Signs (24h Range):  Temp:  [96.1 °F (35.6 °C)-98 °F (36.7 °C)] 98 °F (36.7 °C)  Pulse:  [60-95] 95  Resp:  [18-22] 18  SpO2:  [95 %-98 %] 95 %  BP: ()/() 114/67     Weight: 60.2 kg (132 lb 11.5 oz)  Body mass index is 24.08 kg/m².    Intake/Output Summary (Last 24 hours) at 2/13/2020 1828  Last data filed at 2/13/2020 1630  Gross per 24 hour   Intake 750 ml   Output 1900 ml   Net -1150 ml      Physical Exam   Constitutional: She is oriented to person, place, and time. She appears well-developed and well-nourished.   HENT:   Head: Normocephalic and atraumatic.   Neck: Neck supple. No JVD present.   Cardiovascular: Normal rate, regular rhythm, normal heart sounds and intact distal pulses.   Pulmonary/Chest: She is in respiratory distress (mild ). She has no wheezes.   Abdominal: Soft. Bowel sounds are normal. She exhibits no distension. There is no guarding.   Musculoskeletal: Normal range of motion. She exhibits no edema or tenderness.   Neurological: She is alert and oriented to person, place, and time.   Skin: Skin is warm and dry. Capillary refill takes less than 2 seconds.   Psychiatric: She  has a normal mood and affect.       Significant Labs:   ABGs:   No results for input(s): PH, PCO2, HCO3, POCSATURATED, BE, TOTALHB, COHB, METHB, O2HB, POCFIO2 in the last 48 hours.  CBC:   Recent Labs   Lab 02/12/20  0600 02/13/20  0711   WBC 6.70 9.50   HGB 8.0* 8.5*   HCT 25.7* 27.5*    263     CMP:   Recent Labs   Lab 02/12/20  0600 02/13/20  0711   * 132*   K 3.8 3.6   CL 95 98   CO2 22* 22*   GLU 85 80   BUN 38* 25*   CREATININE 2.6* 2.2*   CALCIUM 8.7 9.2   ANIONGAP 11 12   EGFRNONAA 17* 21*     Coagulation:   No results for input(s): PT, INR, APTT in the last 48 hours.  Lactic Acid: No results for input(s): LACTATE in the last 48 hours.  TSH: No results for input(s): TSH in the last 4320 hours.  Urine Culture: No results for input(s): LABURIN in the last 48 hours.  Urine Studies:   No results for input(s): COLORU, APPEARANCEUA, PHUR, SPECGRAV, PROTEINUA, GLUCUA, KETONESU, BILIRUBINUA, OCCULTUA, NITRITE, UROBILINOGEN, LEUKOCYTESUR, RBCUA, WBCUA, BACTERIA, SQUAMEPITHEL, HYALINECASTS in the last 48 hours.    Invalid input(s): WRIGHTSUR  Imaging Results          X-Ray Chest AP Portable (Final result)  Result time 02/10/20 21:27:28    Final result by Stephanie Mercado MD (02/10/20 21:27:28)                 Impression:      Increase in left pleural effusion with associated compressive atelectasis and/or lower lobe consolidation.      Electronically signed by: Stephanie Mercado  Date:    02/10/2020  Time:    21:27             Narrative:    EXAMINATION:  AP PORTABLE CHEST    CLINICAL HISTORY:  CHF;    TECHNIQUE:  AP portable chest radiograph was submitted.    COMPARISON:  09/26/2019    FINDINGS:  AP portable chest radiograph demonstrates a cardiac silhouette within normal limits.  There is increase left pleural fluid with associated compressive atelectasis and/or lower lobe consolidation.  There is no pneumothorax.  The right lung appears to be grossly clear.  Mild dextroscoliosis is present.  There is  incompletely imaged cervical hardware.                                Significant Imaging: I have reviewed all pertinent imaging results/findings within the past 24 hours.

## 2020-02-14 NOTE — PLAN OF CARE
I gave pt the form that Gavin from [facility] faxed with dialysis chair time of MWF 2:30.  Pt is aware that Monday she will have to arrive at 2:00 to fill out paperwork.  Her spouse Dre is at her bedside and will transport her home.  She has portable oxygen at her bedside.  Rounds completed on pt.  All questions addressed.  Bedside nurse to discuss d/c medications.  Discussed importance to attend all f/u appts and take medications as prescribed.  Verbalized understanding.    Future Appointments   Date Time Provider Department Center   2/18/2020  3:00 PM Clemencia Gambino MD [facility]   3/2/2020  2:00 PM Lindsey Louie MD [facility]   3/10/2020 11:45 AM Aura Diggs MD [facility]   3/16/2020  2:40 PM Pushpa Mcmahon MD [facility]          02/14/20 1237   Final Note   Assessment Type Final Discharge Note   Anticipated Discharge Disposition Home   Hospital Follow Up  Appt(s) scheduled? Yes   Discharge plans and expectations educations in teach back method with documentation complete? Yes   Right Care Referral Info   Post Acute Recommendation No Care     Darrian Guerrero, RN,   [phone]

## 2020-02-14 NOTE — PLAN OF CARE
Patient on oxygen with documented flow per home therapy  Will continue to monitor.  Patient given aerosol treatment with no adverse reactions noted. Patient instructed on proper use.

## 2020-02-14 NOTE — NURSING
IV  Dc'd with cath intact. Discharge instructions given, Pt and her  verbalizes understanding. D/c via W/C Accompanied by  per transport.

## 2020-02-14 NOTE — TELEPHONE ENCOUNTER
----- Message from Disha Guerrero RN sent at 2/14/2020 12:30 PM CST -----  Hello,  Pt is discharging from [facility] today and will need a hospital follow-up appt please.  Please contact pt to set up appt.  Thank you for your help,  Darrian Guerrero RN,   [phone]

## 2020-02-14 NOTE — SUBJECTIVE & OBJECTIVE
Interval History: awake and alert, no new complaint today.   Plan for discharge after HD today     Review of Systems   Constitutional: Negative for chills, diaphoresis and fever.   Respiratory: Negative for cough, chest tightness, shortness of breath and wheezing.    Cardiovascular: Negative for chest pain, palpitations and leg swelling.   Gastrointestinal: Negative for abdominal pain, diarrhea and vomiting.   Genitourinary: Negative for frequency.   Musculoskeletal: Negative for myalgias.   Neurological: Negative for light-headedness.   Psychiatric/Behavioral: Negative for confusion.     Objective:     Vital Signs (Most Recent):  Temp: 97.8 °F (36.6 °C) (02/14/20 0911)  Pulse: (!) 115 (02/14/20 1204)  Resp: 20 (02/14/20 0911)  BP: 130/88 (02/14/20 0911)  SpO2: 99 % (02/14/20 0808) Vital Signs (24h Range):  Temp:  [96 °F (35.6 °C)-98 °F (36.7 °C)] 97.8 °F (36.6 °C)  Pulse:  [] 115  Resp:  [16-20] 20  SpO2:  [88 %-99 %] 99 %  BP: ()/() 130/88     Weight: 60.2 kg (132 lb 11.5 oz)  Body mass index is 24.08 kg/m².    Intake/Output Summary (Last 24 hours) at 2/14/2020 1222  Last data filed at 2/14/2020 0600  Gross per 24 hour   Intake 975 ml   Output 1500 ml   Net -525 ml      Physical Exam   Constitutional: She is oriented to person, place, and time. She appears well-developed and well-nourished.   HENT:   Head: Normocephalic and atraumatic.   Neck: Neck supple. No JVD present.   Cardiovascular: Normal rate, regular rhythm, normal heart sounds and intact distal pulses.   Pulmonary/Chest: She is in respiratory distress (mild ). She has no wheezes.   Abdominal: Soft. Bowel sounds are normal. She exhibits no distension. There is no guarding.   Musculoskeletal: Normal range of motion. She exhibits no edema or tenderness.   Neurological: She is alert and oriented to person, place, and time.   Skin: Skin is warm and dry. Capillary refill takes less than 2 seconds.   Psychiatric: She has a normal mood and  affect.       Significant Labs:   ABGs:   No results for input(s): PH, PCO2, HCO3, POCSATURATED, BE, TOTALHB, COHB, METHB, O2HB, POCFIO2 in the last 48 hours.  CBC:   Recent Labs   Lab 02/13/20  0711 02/14/20  0556   WBC 9.50 10.77   HGB 8.5* 8.3*   HCT 27.5* 27.8*    214     CMP:   Recent Labs   Lab 02/13/20  0711 02/14/20  0556   * 131*   K 3.6 3.4*   CL 98 103   CO2 22* 20*   GLU 80 88   BUN 25* 16   CREATININE 2.2* 2.1*   CALCIUM 9.2 8.9   ANIONGAP 12 8   EGFRNONAA 21* 22*     Coagulation:   No results for input(s): PT, INR, APTT in the last 48 hours.  Lactic Acid: No results for input(s): LACTATE in the last 48 hours.  TSH: No results for input(s): TSH in the last 4320 hours.  Urine Culture: No results for input(s): LABURIN in the last 48 hours.  Urine Studies:   No results for input(s): COLORU, APPEARANCEUA, PHUR, SPECGRAV, PROTEINUA, GLUCUA, KETONESU, BILIRUBINUA, OCCULTUA, NITRITE, UROBILINOGEN, LEUKOCYTESUR, RBCUA, WBCUA, BACTERIA, SQUAMEPITHEL, HYALINECASTS in the last 48 hours.    Invalid input(s): WRIGHTSUR  Imaging Results          X-Ray Chest AP Portable (Final result)  Result time 02/10/20 21:27:28    Final result by Stephanie Mercado MD (02/10/20 21:27:28)                 Impression:      Increase in left pleural effusion with associated compressive atelectasis and/or lower lobe consolidation.      Electronically signed by: Stephanie Mercado  Date:    02/10/2020  Time:    21:27             Narrative:    EXAMINATION:  AP PORTABLE CHEST    CLINICAL HISTORY:  CHF;    TECHNIQUE:  AP portable chest radiograph was submitted.    COMPARISON:  09/26/2019    FINDINGS:  AP portable chest radiograph demonstrates a cardiac silhouette within normal limits.  There is increase left pleural fluid with associated compressive atelectasis and/or lower lobe consolidation.  There is no pneumothorax.  The right lung appears to be grossly clear.  Mild dextroscoliosis is present.  There is incompletely imaged  cervical hardware.                                Significant Imaging: I have reviewed all pertinent imaging results/findings within the past 24 hours.

## 2020-02-14 NOTE — PROGRESS NOTES
[facility]  Progress Note    Patient Name: Katie Quevedo  MRN: [id number]  Patient Class: OP- Observation   Admission Date: 2/10/2020  Length of Stay: 0 days  Attending Physician: Jennifer Bowles*  Primary Care Provider: Kingston Verduzco MD        Subjective:     Principal Problem:Acute on chronic respiratory failure with hypoxia        HPI:  Katie Quevedo is a 74 y.o. female with past medical history of anemia, asthma, bilateral renal cysts, chronic pain, CKD stage 5 (has not started HD), COPD on 2L home O2, dehydration, HTN, lumbar spondylosis, migraines, osteoporosis, osteoarthritis, pulmonary embolism, and seizures who presented to ED with worsening SOB and BERTRAND. Onset 3 days ago, gradually becoming worse. Patient reports associated cough/congestion and lower extremity edema. She denies chest pain and fever/chills. Reports decreased urinary output. Labs remarkable for Na 125, K 5.2, BUN/Cr 57/4.0 (previously 32/2.9 on 2/3/20),  , procalcitonin 0.31. Pt afebrile. CXR shows increase in left pleural effusion with associated compressive atelectasis and/or lower lobe consolidation. She received furosemide 80 mg, vancomycin, cefepime and azithromycin. Patient admitted to [facility].     Overview/Hospital Course:  No notes on file    Interval History: awake and alert, SOB improving, s/p 3 session of HD planning for d/c tomorrow.     Review of Systems   Constitutional: Negative for chills, diaphoresis and fever.   Respiratory: Negative for cough, chest tightness, shortness of breath and wheezing.    Cardiovascular: Negative for chest pain, palpitations and leg swelling.   Gastrointestinal: Negative for abdominal pain, diarrhea and vomiting.   Genitourinary: Negative for frequency.   Musculoskeletal: Negative for myalgias.   Neurological: Negative for light-headedness.   Psychiatric/Behavioral: Negative for confusion.     Objective:     Vital Signs (Most  Recent):  Temp: 98 °F (36.7 °C) (02/13/20 1250)  Pulse: 95 (02/13/20 1630)  Resp: 18 (02/13/20 1250)  BP: 114/67 (02/13/20 1630)  SpO2: (patient not in room at this time) (02/13/20 1651) Vital Signs (24h Range):  Temp:  [96.1 °F (35.6 °C)-98 °F (36.7 °C)] 98 °F (36.7 °C)  Pulse:  [60-95] 95  Resp:  [18-22] 18  SpO2:  [95 %-98 %] 95 %  BP: ()/() 114/67     Weight: 60.2 kg (132 lb 11.5 oz)  Body mass index is 24.08 kg/m².    Intake/Output Summary (Last 24 hours) at 2/13/2020 1828  Last data filed at 2/13/2020 1630  Gross per 24 hour   Intake 750 ml   Output 1900 ml   Net -1150 ml      Physical Exam   Constitutional: She is oriented to person, place, and time. She appears well-developed and well-nourished.   HENT:   Head: Normocephalic and atraumatic.   Neck: Neck supple. No JVD present.   Cardiovascular: Normal rate, regular rhythm, normal heart sounds and intact distal pulses.   Pulmonary/Chest: She is in respiratory distress (mild ). She has no wheezes.   Abdominal: Soft. Bowel sounds are normal. She exhibits no distension. There is no guarding.   Musculoskeletal: Normal range of motion. She exhibits no edema or tenderness.   Neurological: She is alert and oriented to person, place, and time.   Skin: Skin is warm and dry. Capillary refill takes less than 2 seconds.   Psychiatric: She has a normal mood and affect.       Significant Labs:   ABGs:   No results for input(s): PH, PCO2, HCO3, POCSATURATED, BE, TOTALHB, COHB, METHB, O2HB, POCFIO2 in the last 48 hours.  CBC:   Recent Labs   Lab 02/12/20  0600 02/13/20  0711   WBC 6.70 9.50   HGB 8.0* 8.5*   HCT 25.7* 27.5*    263     CMP:   Recent Labs   Lab 02/12/20  0600 02/13/20  0711   * 132*   K 3.8 3.6   CL 95 98   CO2 22* 22*   GLU 85 80   BUN 38* 25*   CREATININE 2.6* 2.2*   CALCIUM 8.7 9.2   ANIONGAP 11 12   EGFRNONAA 17* 21*     Coagulation:   No results for input(s): PT, INR, APTT in the last 48 hours.  Lactic Acid: No results for  input(s): LACTATE in the last 48 hours.  TSH: No results for input(s): TSH in the last 4320 hours.  Urine Culture: No results for input(s): LABURIN in the last 48 hours.  Urine Studies:   No results for input(s): COLORU, APPEARANCEUA, PHUR, SPECGRAV, PROTEINUA, GLUCUA, KETONESU, BILIRUBINUA, OCCULTUA, NITRITE, UROBILINOGEN, LEUKOCYTESUR, RBCUA, WBCUA, BACTERIA, SQUAMEPITHEL, HYALINECASTS in the last 48 hours.    Invalid input(s): WRIGHTSUR  Imaging Results          X-Ray Chest AP Portable (Final result)  Result time 02/10/20 21:27:28    Final result by Stephanie Mercado MD (02/10/20 21:27:28)                 Impression:      Increase in left pleural effusion with associated compressive atelectasis and/or lower lobe consolidation.      Electronically signed by: Stephanie Mercado  Date:    02/10/2020  Time:    21:27             Narrative:    EXAMINATION:  AP PORTABLE CHEST    CLINICAL HISTORY:  CHF;    TECHNIQUE:  AP portable chest radiograph was submitted.    COMPARISON:  09/26/2019    FINDINGS:  AP portable chest radiograph demonstrates a cardiac silhouette within normal limits.  There is increase left pleural fluid with associated compressive atelectasis and/or lower lobe consolidation.  There is no pneumothorax.  The right lung appears to be grossly clear.  Mild dextroscoliosis is present.  There is incompletely imaged cervical hardware.                                Significant Imaging: I have reviewed all pertinent imaging results/findings within the past 24 hours.      Assessment/Plan:      * Acute on chronic respiratory failure with hypoxia  Pleural effusion  ? Left lower lobe pneumonia   Supplemental home O2 (usually on 2L)   Acute kidney injury superimposed on CKD  Volume overload  Continue furosemide at home dose-  20 mg  BID, titrate as needed   Continue empiric cefepime, follow blood cultures, prn decongestants and  Nebs   Consider IR consult for thoracentesis   Continue supplemental O2          Hyponatremia  Fluid restriction      COPD (chronic obstructive pulmonary disease)  Chronic  continue Trelegy Ellipta, supplemental O2       Anemia, chronic renal failure, stage 4 (severe)  stable      Acute kidney injury superimposed on CKD  Anemia, chronic renal failure, stage 4 (severe)  Hyponatremia   Hyperkalemia     Pt has Left AV graft. Reports has not started dialysis.   Nephrology consulted. Avoid nephrotoxic meds        Essential hypertension  Diastolic dysfunction, left ventricle  Troponin negative  BNP mildly elevated  Continue amlodipine and furosemide       VTE Risk Mitigation (From admission, onward)         Ordered     IP VTE HIGH RISK PATIENT  Once      02/10/20 2134     Place sequential compression device  Until discontinued      02/10/20 2134                      Jennifer Bowles MD  Department of Hospital Medicine   [facility]

## 2020-02-14 NOTE — PLAN OF CARE
Received patient upon rounds at 1940H, patient seen in bed on semi-mccloud's position. Conscious , coherent to time, date, place, person and situation. GCS 15. No subjective complaint of pain. With saline lock, right  arm gauge 20, flushed patent, with clean and dry dressing. Left Av fistula (+) thrill and bruit. Telemetry normal sinus rhythm  90's. Oxygen 2 lpm via NC 88% -94% oxygen saturation. Advised patient to call for any assistance. Call bell within reach. Bed alarm On.Safety fall precaution maintained.Will continue to monitor patient.  0727H- Patient stable VS over the night, afebrile. No untoward signs and symptoms. Telemetry no ectopy noted over the night. Free from fall. IV line patent.No urine output last night.Will endorse patient to day shift Nurse.

## 2020-02-16 LAB
BACTERIA BLD CULT: NORMAL
BACTERIA BLD CULT: NORMAL

## 2020-02-17 LAB
HCV RNA SERPL NAA+PROBE-LOG IU: <1.08 LOG (10) IU/ML
HCV RNA SERPL QL NAA+PROBE: NOT DETECTED IU/ML
HCV RNA SPEC NAA+PROBE-ACNC: <12 IU/ML

## 2020-02-18 ENCOUNTER — HOSPITAL ENCOUNTER (OUTPATIENT)
Dept: RADIOLOGY | Facility: HOSPITAL | Age: 75
Discharge: HOME | End: 2020-02-18
Attending: INTERNAL MEDICINE
Payer: MEDICARE

## 2020-02-18 ENCOUNTER — NURSE TRIAGE (OUTPATIENT)
Dept: ADMINISTRATIVE | Facility: CLINIC | Age: 75
End: 2020-02-18

## 2020-02-18 ENCOUNTER — OFFICE VISIT (OUTPATIENT)
Dept: PRIMARY CARE CLINIC | Facility: CLINIC | Age: 75
End: 2020-02-18
Payer: MEDICARE

## 2020-02-18 VITALS
HEART RATE: 110 BPM | WEIGHT: 125 LBS | SYSTOLIC BLOOD PRESSURE: 120 MMHG | BODY MASS INDEX: 22.68 KG/M2 | DIASTOLIC BLOOD PRESSURE: 65 MMHG | OXYGEN SATURATION: 91 % | TEMPERATURE: 98 F

## 2020-02-18 DIAGNOSIS — J18.9 PNEUMONIA, UNSPECIFIED ORGANISM: ICD-10-CM

## 2020-02-18 DIAGNOSIS — J44.9 CHRONIC OBSTRUCTIVE PULMONARY DISEASE, UNSPECIFIED COPD TYPE: ICD-10-CM

## 2020-02-18 DIAGNOSIS — F41.9 ANXIETY: ICD-10-CM

## 2020-02-18 DIAGNOSIS — Z99.81 CHRONIC RESPIRATORY FAILURE WITH HYPOXIA, ON HOME O2 THERAPY: ICD-10-CM

## 2020-02-18 DIAGNOSIS — Z99.2 ESRD (END STAGE RENAL DISEASE) ON DIALYSIS: ICD-10-CM

## 2020-02-18 DIAGNOSIS — N18.6 ESRD (END STAGE RENAL DISEASE) ON DIALYSIS: ICD-10-CM

## 2020-02-18 DIAGNOSIS — J96.21 ACUTE ON CHRONIC RESPIRATORY FAILURE WITH HYPOXIA: Primary | ICD-10-CM

## 2020-02-18 DIAGNOSIS — J96.11 CHRONIC RESPIRATORY FAILURE WITH HYPOXIA, ON HOME O2 THERAPY: ICD-10-CM

## 2020-02-18 DIAGNOSIS — I50.32 CHRONIC DIASTOLIC HEART FAILURE: ICD-10-CM

## 2020-02-18 DIAGNOSIS — J90 PLEURAL EFFUSION: ICD-10-CM

## 2020-02-18 PROCEDURE — 1125F PR PAIN SEVERITY QUANTIFIED, PAIN PRESENT: ICD-10-PCS | Mod: HCNC,S$GLB,, | Performed by: INTERNAL MEDICINE

## 2020-02-18 PROCEDURE — 1101F PR PT FALLS ASSESS DOC 0-1 FALLS W/OUT INJ PAST YR: ICD-10-PCS | Mod: HCNC,CPTII,S$GLB, | Performed by: INTERNAL MEDICINE

## 2020-02-18 PROCEDURE — 99215 OFFICE O/P EST HI 40 MIN: CPT | Mod: HCNC,S$GLB,, | Performed by: INTERNAL MEDICINE

## 2020-02-18 PROCEDURE — 1159F PR MEDICATION LIST DOCUMENTED IN MEDICAL RECORD: ICD-10-PCS | Mod: HCNC,S$GLB,, | Performed by: INTERNAL MEDICINE

## 2020-02-18 PROCEDURE — 71046 XR CHEST PA AND LATERAL: ICD-10-PCS | Mod: 26,HCNC,, | Performed by: RADIOLOGY

## 2020-02-18 PROCEDURE — 71046 X-RAY EXAM CHEST 2 VIEWS: CPT | Mod: TC,HCNC,FY

## 2020-02-18 PROCEDURE — 1101F PT FALLS ASSESS-DOCD LE1/YR: CPT | Mod: HCNC,CPTII,S$GLB, | Performed by: INTERNAL MEDICINE

## 2020-02-18 PROCEDURE — 1159F MED LIST DOCD IN RCRD: CPT | Mod: HCNC,S$GLB,, | Performed by: INTERNAL MEDICINE

## 2020-02-18 PROCEDURE — 99999 PR PBB SHADOW E&M-EST. PATIENT-LVL III: ICD-10-PCS | Mod: PBBFAC,HCNC,, | Performed by: INTERNAL MEDICINE

## 2020-02-18 PROCEDURE — 71046 X-RAY EXAM CHEST 2 VIEWS: CPT | Mod: 26,HCNC,, | Performed by: RADIOLOGY

## 2020-02-18 PROCEDURE — 99215 PR OFFICE/OUTPT VISIT, EST, LEVL V, 40-54 MIN: ICD-10-PCS | Mod: HCNC,S$GLB,, | Performed by: INTERNAL MEDICINE

## 2020-02-18 PROCEDURE — 99999 PR PBB SHADOW E&M-EST. PATIENT-LVL III: CPT | Mod: PBBFAC,HCNC,, | Performed by: INTERNAL MEDICINE

## 2020-02-18 PROCEDURE — 1125F AMNT PAIN NOTED PAIN PRSNT: CPT | Mod: HCNC,S$GLB,, | Performed by: INTERNAL MEDICINE

## 2020-02-18 RX ORDER — BUSPIRONE HYDROCHLORIDE 15 MG/1
15 TABLET ORAL 2 TIMES DAILY
Qty: 60 TABLET | Refills: 3 | Status: SHIPPED | OUTPATIENT
Start: 2020-02-18 | End: 2020-03-17

## 2020-02-18 RX ORDER — CLONAZEPAM 1 MG/1
1 TABLET ORAL 2 TIMES DAILY PRN
Qty: 28 TABLET | Refills: 0 | Status: SHIPPED | OUTPATIENT
Start: 2020-02-18 | End: 2020-03-17 | Stop reason: SDUPTHER

## 2020-02-18 RX ORDER — LEVOFLOXACIN 750 MG/1
750 TABLET ORAL DAILY
Qty: 7 TABLET | Refills: 0 | Status: SHIPPED | OUTPATIENT
Start: 2020-02-18 | End: 2020-11-09

## 2020-02-19 ENCOUNTER — TELEPHONE (OUTPATIENT)
Dept: PRIMARY CARE CLINIC | Facility: CLINIC | Age: 75
End: 2020-02-19

## 2020-02-19 PROBLEM — E87.5 HYPERKALEMIA: Status: RESOLVED | Noted: 2018-03-05 | Resolved: 2020-02-19

## 2020-02-19 PROBLEM — Z99.2 ESRD (END STAGE RENAL DISEASE) ON DIALYSIS: Chronic | Status: ACTIVE | Noted: 2020-02-19

## 2020-02-19 PROBLEM — N18.4 CKD (CHRONIC KIDNEY DISEASE) STAGE 4, GFR 15-29 ML/MIN: Status: RESOLVED | Noted: 2019-12-18 | Resolved: 2020-02-19

## 2020-02-19 PROBLEM — T82.868A THROMBOSIS OF RENAL DIALYSIS ARTERIOVENOUS GRAFT: Status: RESOLVED | Noted: 2020-02-03 | Resolved: 2020-02-19

## 2020-02-19 PROBLEM — T82.868A CLOTTED RENAL DIALYSIS AV GRAFT: Status: RESOLVED | Noted: 2020-02-03 | Resolved: 2020-02-19

## 2020-02-19 PROBLEM — E87.1 HYPONATREMIA: Status: RESOLVED | Noted: 2020-02-11 | Resolved: 2020-02-19

## 2020-02-19 PROBLEM — J96.11 CHRONIC RESPIRATORY FAILURE WITH HYPOXIA, ON HOME O2 THERAPY: Chronic | Status: ACTIVE | Noted: 2019-07-18

## 2020-02-19 PROBLEM — N18.6 ESRD (END STAGE RENAL DISEASE) ON DIALYSIS: Status: ACTIVE | Noted: 2020-02-19

## 2020-02-19 PROBLEM — J44.9 COPD (CHRONIC OBSTRUCTIVE PULMONARY DISEASE): Chronic | Status: ACTIVE | Noted: 2019-12-18

## 2020-02-19 PROBLEM — Z99.2 ESRD (END STAGE RENAL DISEASE) ON DIALYSIS: Status: ACTIVE | Noted: 2020-02-19

## 2020-02-19 PROBLEM — N18.6 ESRD (END STAGE RENAL DISEASE) ON DIALYSIS: Chronic | Status: ACTIVE | Noted: 2020-02-19

## 2020-02-19 PROBLEM — Z99.81 CHRONIC RESPIRATORY FAILURE WITH HYPOXIA, ON HOME O2 THERAPY: Chronic | Status: ACTIVE | Noted: 2019-07-18

## 2020-02-19 NOTE — TELEPHONE ENCOUNTER
New medication today, clonazepam, and wants to know if she can take it with trazadone, Paged on call Dr. Benjamin shipley. She stated that she could not comment on the medications one way or another without a physicians note from today's visit and that it would not be preferable for the patient to take them at the same time. Patient informed and advised to call Dr. Gambino tomorrow for further instruction. Patient already took Clonazepam at 2100 so advised not to take Trazadone.       Reason for Disposition   Caller has URGENT medication question about med that PCP prescribed and triager unable to answer question    Protocols used: ST MEDICATION QUESTION CALL-A-AH

## 2020-02-19 NOTE — TELEPHONE ENCOUNTER
Patient seen in clinic 2/18/20 for hospital follow up. Reported recent onset cough, chills, no fever. Dyspnea worse than baseline.  Patient appeared short of breath at rest during exam, with shallow, slightly labored respirations. Cough noted during exam.  Patient maintaining oxygen saturations with baseline supplemental oxygen.  Chest X ray with moderate left pleural effusion.   Patient called today and advised to proceed to ED to be evaluated for thoracentesis.

## 2020-02-19 NOTE — PROGRESS NOTES
[facility]   New Visit Progress Note   Recent Hospital Discharge     PRESENTING HISTORY     Chief Complaint/Reason for Admission:  Follow up Hospital Discharge   PCP: Kingston Verduzco MD    History of Present Illness:  Ms. Katie Quevedo is a 74 y.o. female who was recently admitted to the hospital.    [facility]  Discharge Summary        Patient Name: Katie Quevedo  MRN: [id number]  Admission Date: 2/10/2020  Hospital Length of Stay: 0 days  Discharge Date and Time: 2/14/2020  5:59 PM  Attending Physician: Jennifer Bowles*   Discharging Provider: Jennifer Bowles MD  Primary Care Provider: Kingston Verduzco MD  ___________________________________________________________________    Today:  Presents to [facility] for initial hospital follow up.  Recently hospitalized for acute on chronic respiratory failure which was multifactorial in nature- recurrent pleural effusion, volume overload, and LLL PNA.  She was treated with empiric ABX.  Hemodialysis was initiated in setting of YOSVANY on CKD with volume overload.  She responded well to above interventions.  Thoracentesis was not pursued during hospitalization.   She was discharged to home with plans for continued outpatient hemodialysis.  No further ABX were prescribed upon discharge.     Patient unaccompanied today.  Ambulatory with motorized scooter.  Supplemental oxygen in place.  She reports dyspnea beyond baseline, ongoing cough, chills, generalized ill feeling.  Patient noted to be highly anxious, tearful, mild respiratory distress with pursed lips, shallow respirations, but maintaining oxygen saturations.     Attending dialysis on MWF at [facility] location, using LUE AV graft.  She is unsure if they have been able to achieve her dry weight.  Patient very upset by initiation of dialysis and the adjustment to this new lifestyle.    Review of Systems  General ROS: negative for fever + weight loss + chills    Psychological ROS: negative for hallucination, depression or suicidal ideation  Ophthalmic ROS: negative for blurry vision, photophobia or eye pain  ENT ROS: negative for epistaxis, sore throat or rhinorrhea  Respiratory ROS: + cough, shortness of breath, no wheezing  Cardiovascular ROS: no chest pain + dyspnea on exertion  Gastrointestinal ROS: no abdominal pain, change in bowel habits, or black/ bloody stools  Genito-Urinary ROS: no dysuria, trouble voiding, or hematuria  Musculoskeletal ROS: negative for gait disturbance or muscular weakness  Neurological ROS: no syncope or seizures; no ataxia  Dermatological ROS: negative for pruritis, rash and jaundice      PAST HISTORY:     Past Medical History:   Diagnosis Date    Acute congestive heart failure 2/10/2020    Anemia     Bilateral renal cysts     Cataract     Chronic LBP 7/26/2012    Chronic pain     CKD (chronic kidney disease), stage IV     COPD (chronic obstructive pulmonary disease)     Dehydration     Encounter for blood transfusion     HTN (hypertension)     Lumbar spondylosis     Melanoma     of the lip    Metabolic bone disease     Migraines, neuralgic     Osteoporosis     Primary osteoarthritis of both knees     s/p Rt TKA    Pulmonary embolism with infarction     Seizures 1972    x1 only    Subdeltoid bursitis, L>R. 9/27/2012    Ulcer     Vitamin D deficiency disease        Past Surgical History:   Procedure Laterality Date    BLADDER SUSPENSION      CATARACT EXTRACTION  11/18/13    left eye    CERVICAL LAMINECTOMY      x3, fusion x1    COLONOSCOPY  2009    HYSTERECTOMY      JOINT REPLACEMENT  2001    total right knee     LUMBAR LAMINECTOMY      x 3, fusion x1    OOPHORECTOMY      PLACEMENT OF ARTERIOVENOUS GRAFT Left 1/21/2020    Procedure: INSERTION, GRAFT, ARTERIOVENOUS;  Surgeon: Lindsey Louie MD;  [facility] OR;  Service: General;  Laterality: Left;    THROMBECTOMY Left 2/3/2020    Procedure:  THROMBECTOMY;  Surgeon: Lindsey Louie MD;  Location: [facility] OR;  Service: General;  Laterality: Left;       Family History   Problem Relation Age of Onset    Arthritis Mother     Stroke Mother     Hypertension Father     Cancer Father     Cataracts Father     Diabetes Maternal Aunt     Hypertension Maternal Grandfather     Heart disease Maternal Grandfather     Heart attack Maternal Grandfather     Cataracts Sister     Glaucoma Cousin          MEDICATIONS & ALLERGIES:     Current Outpatient Medications on File Prior to Visit   Medication Sig Dispense Refill    albuterol-ipratropium (DUO-NEB) 2.5 mg-0.5 mg/3 mL nebulizer solution Take 3 mLs by nebulization every 6 (six) hours as needed for Shortness of Breath. Rescue 3 Box 5    allopurinol (ZYLOPRIM) 100 MG tablet Take 1 tablet (100 mg total) by mouth once daily. Take 100 mg -on Monday and Friday only 30 tablet 1    amLODIPine (NORVASC) 2.5 MG tablet Take 2.5 mg by mouth once daily.       cyproheptadine (PERIACTIN) 4 mg tablet Take 1 tablet (4 mg total) by mouth every 8 (eight) hours. 24 tablet 0    diazePAM (VALIUM) 2 MG tablet Take 2 mg by mouth once daily.       diphenhydrAMINE (BENADRYL) 25 mg capsule Take 25 mg by mouth every 6 (six) hours as needed for Itching.      fluticasone-umeclidin-vilanter (TRELEGY ELLIPTA) 100-62.5-25 mcg DsDv Inhale 1 puff by mouth into the lungs once daily. 90 each 3    furosemide (LASIX) 20 MG tablet Take 1 tablet (20 mg total) by mouth 2 (two) times daily. 60 tablet 11    HYDROcodone-acetaminophen (NORCO)  mg per tablet Take 1 tablet by mouth 3 (three) times daily as needed. 90 tablet 0    mupirocin (BACTROBAN) 2 % ointment apply by Nasal route 2 (two) times daily. 22 g 0    ondansetron (ZOFRAN-ODT) 4 MG TbDL Dissolve one tablet under the tongue every 6 (six) hours as needed. 30 tablet 3    sodium bicarbonate 650 MG tablet Take 1 tablet (650 mg total) by mouth 2 (two) times daily. 120 tablet 11     traZODone (DESYREL) 100 MG tablet Take 100 mg by mouth nightly as needed for Insomnia.      zolpidem (AMBIEN) 5 MG Tab Take 5 mg by mouth every evening.   5     No current facility-administered medications on file prior to visit.         Review of patient's allergies indicates:   Allergen Reactions    Aspirin      Other reaction(s): hx of ulcers    Tetracycline Swelling     Other reaction(s): Swelling    Penicillins Rash     Other reaction(s): Hives  Other reaction(s): Rash  Other reaction(s): Rash  Other reaction(s): Hives       OBJECTIVE:     Vital Signs:  /65 (BP Location: Right arm, Patient Position: Sitting, BP Method: Small (Automatic))   Pulse 110   Temp 98 °F (36.7 °C) (Oral)   Wt 56.7 kg (125 lb)   SpO2 (!) 91%   BMI 22.68 kg/m²   Wt Readings from Last 1 Encounters:   02/18/20 1511 56.7 kg (125 lb)     Body mass index is 22.68 kg/m².   (!) 91%    Physical Exam:  /65 (BP Location: Right arm, Patient Position: Sitting, BP Method: Small (Automatic))   Pulse 110   Temp 98 °F (36.7 °C) (Oral)   Wt 56.7 kg (125 lb)   SpO2 (!) 91%   BMI 22.68 kg/m²   General appearance: alert, cooperative  + mild distress, tearful, anxious   Constitutional:Oriented to person, place, and time  + thin, frail    HEENT: Normocephalic, atraumatic, neck symmetrical, no nasal discharge   Eyes: conjunctivae/corneas clear, PERRL, EOM's intact  Lungs: + poor air movement, diminished breath sounds Left side, cough noted during exam; pursed lips, shallow respirations noted, patient appears uncomfortable   Heart: + increased rate and regular rhythm without rub; no displacement of the PMI   Abdomen: soft, non-tender; bowel sounds normoactive; no organomegaly  Extremities: extremities symmetric; no clubbing, cyanosis, or edema  Integument: Skin color, texture, turgor normal; no rashes; hair distrubution normal  Neurologic: Alert and oriented X 3, normal strength, normal coordination and gait  Psychiatric: no pressured  speech;  no evidence of impaired cognition   + tearful, anxious     Laboratory  Lab Results   Component Value Date    WBC 10.77 02/14/2020    HGB 8.3 (L) 02/14/2020    HCT 27.8 (L) 02/14/2020    MCV 95 02/14/2020     02/14/2020     BMP  Lab Results   Component Value Date     (L) 02/14/2020    K 3.4 (L) 02/14/2020     02/14/2020    CO2 20 (L) 02/14/2020    BUN 16 02/14/2020    CREATININE 2.1 (H) 02/14/2020    CALCIUM 8.9 02/14/2020    ANIONGAP 8 02/14/2020    ESTGFRAFRICA 26 (A) 02/14/2020    EGFRNONAA 22 (A) 02/14/2020     Lab Results   Component Value Date    ALT 10 02/10/2020    AST 23 02/10/2020    ALKPHOS 160 (H) 02/10/2020    BILITOT 0.5 02/10/2020     Lab Results   Component Value Date    INR 1.0 02/10/2020    INR 1.0 07/17/2019    INR 1.1 12/26/2018     Lab Results   Component Value Date    HGBA1C 5.2 01/22/2018       ASSESSMENT & PLAN:     Acute on chronic respiratory failure with hypoxia    Pneumonia, unspecified organism  -     X-Ray Chest PA And Lateral; Future; Expected date: 02/18/2020  -     levoFLOXacin (LEVAQUIN) 750 MG tablet; Take 1 tablet (750 mg total) by mouth once daily.  Dispense: 7 tablet; Refill: 0    Pleural effusion    Anxiety  -     busPIRone (BUSPAR) 15 MG tablet; Take 1 tablet (15 mg total) by mouth 2 (two) times daily.  Dispense: 60 tablet; Refill: 3  -     clonazePAM (KLONOPIN) 1 MG tablet; Take 1 tablet (1 mg total) by mouth 2 (two) times daily as needed for Anxiety.  Dispense: 28 tablet; Refill: 0    Chronic obstructive pulmonary disease, unspecified COPD type    Chronic respiratory failure with hypoxia, on home O2 therapy    Chronic diastolic heart failure    ESRD (end stage renal disease) on dialysis        Instructions for the patient:      Scheduled Follow-up :  Future Appointments   Date Time Provider Department Center   3/2/2020  2:00 PM Lindsey Louie MD [facility]   3/5/2020  2:00 PM Clemencia Gambino MD [facility]   3/10/2020 11:45 AM  Aura Diggs MD [facility]   3/16/2020  2:40 PM Pushpa Mcmahon MD [facility]       Post Visit Medication List:     Medication List           Accurate as of February 18, 2020 11:59 PM. If you have any questions, ask your nurse or doctor.               START taking these medications    clonazePAM 1 MG tablet  Commonly known as:  KLONOPIN  Take 1 tablet (1 mg total) by mouth 2 (two) times daily as needed for Anxiety.  Started by:  Clemencia Gambino MD     levoFLOXacin 750 MG tablet  Commonly known as:  LEVAQUIN  Take 1 tablet (750 mg total) by mouth once daily.  Started by:  Clemencia Gambino MD        CHANGE how you take these medications    busPIRone 15 MG tablet  Commonly known as:  BUSPAR  Take 1 tablet (15 mg total) by mouth 2 (two) times daily.  What changed:    · medication strength  · how much to take  · when to take this  Changed by:  Clemencia Gambino MD        CONTINUE taking these medications    albuterol-ipratropium 2.5 mg-0.5 mg/3 mL nebulizer solution  Commonly known as:  DUO-NEB  Take 3 mLs by nebulization every 6 (six) hours as needed for Shortness of Breath. Rescue     allopurinoL 100 MG tablet  Commonly known as:  ZYLOPRIM  Take 1 tablet (100 mg total) by mouth once daily. Take 100 mg -on Monday and Friday only     amLODIPine 2.5 MG tablet  Commonly known as:  NORVASC     cyproheptadine 4 mg tablet  Commonly known as:  PERIACTIN  Take 1 tablet (4 mg total) by mouth every 8 (eight) hours.     diphenhydrAMINE 25 mg capsule  Commonly known as:  BENADRYL     furosemide 20 MG tablet  Commonly known as:  LASIX  Take 1 tablet (20 mg total) by mouth 2 (two) times daily.     HYDROcodone-acetaminophen  mg per tablet  Commonly known as:  NORCO  Take 1 tablet by mouth 3 (three) times daily as needed.     mupirocin 2 % ointment  Commonly known as:  BACTROBAN  apply by Nasal route 2 (two) times daily.     ondansetron 4 MG Tbdl  Commonly known as:  ZOFRAN-ODT  Dissolve one tablet under  the tongue every 6 (six) hours as needed.     sodium bicarbonate 650 MG tablet  Take 1 tablet (650 mg total) by mouth 2 (two) times daily.     traZODone 100 MG tablet  Commonly known as:  DESYREL     Trelelanre Ellipta 100-62.5-25 mcg Dsdv  Generic drug:  fluticasone-umeclidin-vilanter  Inhale 1 puff by mouth into the lungs once daily.     zolpidem 5 MG Tab  Commonly known as:  AMBIEN        STOP taking these medications    diazePAM 2 MG tablet  Commonly known as:  VALIUM  Stopped by:  Clemencia Gambino MD           Where to Get Your Medications      These medications were sent to [facility]  [address], [facility] [address]    Hours:  Mon-Fri, 8a-5:30p Phone:  [phone]   · busPIRone 15 MG tablet  · clonazePAM 1 MG tablet  · levoFLOXacin 750 MG tablet         Signing Physician:  Clemencia Gambino MD

## 2020-02-19 NOTE — TELEPHONE ENCOUNTER
Spoke to patient and she stated that she threw up after taking her morning dose of klonopin, I notified her that per Dr. Gambino she needs to wait until this afternoon to take her next dose. Patient verbalized understanding.

## 2020-02-20 ENCOUNTER — TELEPHONE (OUTPATIENT)
Dept: PHYSICAL MEDICINE AND REHAB | Facility: CLINIC | Age: 75
End: 2020-02-20

## 2020-02-20 DIAGNOSIS — G89.29 CHRONIC NECK PAIN: ICD-10-CM

## 2020-02-20 DIAGNOSIS — G89.29 CHRONIC MIDLINE LOW BACK PAIN WITH RIGHT-SIDED SCIATICA: ICD-10-CM

## 2020-02-20 DIAGNOSIS — M54.41 CHRONIC MIDLINE LOW BACK PAIN WITH RIGHT-SIDED SCIATICA: ICD-10-CM

## 2020-02-20 DIAGNOSIS — M16.0 PRIMARY OSTEOARTHRITIS OF BOTH HIPS: ICD-10-CM

## 2020-02-20 DIAGNOSIS — M54.2 CHRONIC NECK PAIN: ICD-10-CM

## 2020-02-20 LAB — HCV GENTYP SERPL NAA+PROBE: NORMAL

## 2020-02-20 RX ORDER — HYDROCODONE BITARTRATE AND ACETAMINOPHEN 10; 325 MG/1; MG/1
1 TABLET ORAL 3 TIMES DAILY PRN
Qty: 90 TABLET | Refills: 0 | Status: SHIPPED | OUTPATIENT
Start: 2020-02-23 | End: 2020-03-17 | Stop reason: SDUPTHER

## 2020-02-20 NOTE — TELEPHONE ENCOUNTER
MD Tahira Crowe MA   Caller: Unspecified (Today,  9:18 AM)             Pls let her know her last prescription was picked up 1/23/20.   A prescription for refill was sent for 2/23/20.    Previous Messages              Called patient number, no answer.  Patient to contact office back.      Patient Is in the hospital.      ----- Message from Shila Ambriz sent at 2/20/2020  8:42 AM CST -----  Contact: self @ [phone]  Pt is req a refill for hydrocodone 10/325.     [facility] #[id number] - [place], [place] - [address] [phone] (Phone)  [phone] (Fax)

## 2020-02-23 ENCOUNTER — HOSPITAL ENCOUNTER (INPATIENT)
Facility: HOSPITAL | Age: 75
LOS: 5 days | Discharge: HOME | DRG: 252 | End: 2020-02-28
Attending: EMERGENCY MEDICINE | Admitting: FAMILY MEDICINE
Payer: MEDICARE

## 2020-02-23 DIAGNOSIS — R41.82 AMS (ALTERED MENTAL STATUS): ICD-10-CM

## 2020-02-23 DIAGNOSIS — T40.2X1A OPIOID OVERDOSE, ACCIDENTAL OR UNINTENTIONAL, INITIAL ENCOUNTER: ICD-10-CM

## 2020-02-23 DIAGNOSIS — T40.2X1A OPIOID OVERDOSE: ICD-10-CM

## 2020-02-23 DIAGNOSIS — J96.11 CHRONIC RESPIRATORY FAILURE WITH HYPOXIA, ON HOME O2 THERAPY: Chronic | ICD-10-CM

## 2020-02-23 DIAGNOSIS — G93.40 ACUTE ENCEPHALOPATHY: ICD-10-CM

## 2020-02-23 DIAGNOSIS — G89.29 CHRONIC NECK PAIN: Chronic | ICD-10-CM

## 2020-02-23 DIAGNOSIS — Z51.5 PALLIATIVE CARE ENCOUNTER: ICD-10-CM

## 2020-02-23 DIAGNOSIS — N17.9 AKI (ACUTE KIDNEY INJURY): ICD-10-CM

## 2020-02-23 DIAGNOSIS — J43.2 CENTRILOBULAR EMPHYSEMA: Chronic | ICD-10-CM

## 2020-02-23 DIAGNOSIS — Z99.2 ESRD (END STAGE RENAL DISEASE) ON DIALYSIS: Primary | ICD-10-CM

## 2020-02-23 DIAGNOSIS — N18.6 ESRD (END STAGE RENAL DISEASE) ON DIALYSIS: Primary | ICD-10-CM

## 2020-02-23 DIAGNOSIS — Z99.81 CHRONIC RESPIRATORY FAILURE WITH HYPOXIA, ON HOME O2 THERAPY: Chronic | ICD-10-CM

## 2020-02-23 DIAGNOSIS — M54.2 CHRONIC NECK PAIN: Chronic | ICD-10-CM

## 2020-02-23 DIAGNOSIS — Z71.89 GOALS OF CARE, COUNSELING/DISCUSSION: ICD-10-CM

## 2020-02-23 DIAGNOSIS — J96.21 ACUTE ON CHRONIC RESPIRATORY FAILURE WITH HYPOXIA: ICD-10-CM

## 2020-02-23 DIAGNOSIS — Z71.89 COUNSELING REGARDING ADVANCE CARE PLANNING AND GOALS OF CARE: ICD-10-CM

## 2020-02-23 DIAGNOSIS — T40.2X4A OPIOID OVERDOSE, UNDETERMINED INTENT, INITIAL ENCOUNTER: ICD-10-CM

## 2020-02-23 DIAGNOSIS — E87.5 HYPERKALEMIA: ICD-10-CM

## 2020-02-23 LAB
ALBUMIN SERPL BCP-MCNC: 3 G/DL (ref 3.5–5.2)
ALLENS TEST: ABNORMAL
ALP SERPL-CCNC: 107 U/L (ref 55–135)
ALT SERPL W/O P-5'-P-CCNC: 9 U/L (ref 10–44)
AMMONIA PLAS-SCNC: 12 UMOL/L (ref 10–50)
ANION GAP SERPL CALC-SCNC: 8 MMOL/L (ref 8–16)
AST SERPL-CCNC: 22 U/L (ref 10–40)
BILIRUB SERPL-MCNC: 0.3 MG/DL (ref 0.1–1)
BUN SERPL-MCNC: 46 MG/DL (ref 8–23)
CALCIUM SERPL-MCNC: 9.3 MG/DL (ref 8.7–10.5)
CHLORIDE SERPL-SCNC: 97 MMOL/L (ref 95–110)
CO2 SERPL-SCNC: 32 MMOL/L (ref 23–29)
CREAT SERPL-MCNC: 3.8 MG/DL (ref 0.5–1.4)
DELSYS: ABNORMAL
DELSYS: ABNORMAL
EST. GFR  (AFRICAN AMERICAN): 13 ML/MIN/1.73 M^2
EST. GFR  (NON AFRICAN AMERICAN): 11 ML/MIN/1.73 M^2
FLOW: 2
GLUCOSE SERPL-MCNC: 86 MG/DL (ref 70–110)
HCO3 UR-SCNC: 29.4 MMOL/L (ref 24–28)
HCO3 UR-SCNC: 29.5 MMOL/L (ref 24–28)
HCO3 UR-SCNC: 29.8 MMOL/L (ref 24–28)
HCT VFR BLD CALC: 25 %PCV (ref 36–54)
HCT VFR BLD CALC: 28 %PCV (ref 36–54)
HGB BLD-MCNC: 10 G/DL
HGB BLD-MCNC: 9 G/DL
INR PPP: 1.1 (ref 0.8–1.2)
LACTATE SERPL-SCNC: 2.2 MMOL/L (ref 0.5–2.2)
LIPASE SERPL-CCNC: 25 U/L (ref 4–60)
MAGNESIUM SERPL-MCNC: 2.1 MG/DL (ref 1.6–2.6)
MODE: ABNORMAL
PCO2 BLDA: 20 MMHG (ref 35–45)
PCO2 BLDA: 31.2 MMHG (ref 35–45)
PCO2 BLDA: 65.7 MMHG (ref 35–45)
PH SMN: 7.26 [PH] (ref 7.35–7.45)
PH SMN: 7.58 [PH] (ref 7.35–7.45)
PH SMN: 7.78 [PH] (ref 7.35–7.45)
PO2 BLDA: 182 MMHG (ref 80–100)
PO2 BLDA: 42 MMHG (ref 80–100)
PO2 BLDA: 85 MMHG (ref 80–100)
POC BE: 11 MMOL/L
POC BE: 3 MMOL/L
POC BE: 8 MMOL/L
POC IONIZED CALCIUM: 1.06 MMOL/L (ref 1.06–1.42)
POC IONIZED CALCIUM: 1.22 MMOL/L (ref 1.06–1.42)
POC SATURATED O2: 100 % (ref 95–100)
POC SATURATED O2: 68 % (ref 95–100)
POC SATURATED O2: 98 % (ref 95–100)
POC TCO2: 30 MMOL/L (ref 23–27)
POC TCO2: 30 MMOL/L (ref 23–27)
POC TCO2: 32 MMOL/L (ref 23–27)
POCT GLUCOSE: 123 MG/DL (ref 70–110)
POTASSIUM BLD-SCNC: 5.7 MMOL/L (ref 3.5–5.1)
POTASSIUM BLD-SCNC: 5.9 MMOL/L (ref 3.5–5.1)
POTASSIUM SERPL-SCNC: 6.2 MMOL/L (ref 3.5–5.1)
PROT SERPL-MCNC: 6.7 G/DL (ref 6–8.4)
PROTHROMBIN TIME: 11.8 SEC (ref 9–12.5)
SAMPLE: ABNORMAL
SITE: ABNORMAL
SODIUM BLD-SCNC: 131 MMOL/L (ref 136–145)
SODIUM BLD-SCNC: 136 MMOL/L (ref 136–145)
SODIUM SERPL-SCNC: 137 MMOL/L (ref 136–145)
TROPONIN I SERPL DL<=0.01 NG/ML-MCNC: 0.01 NG/ML (ref 0–0.03)

## 2020-02-23 PROCEDURE — 84443 ASSAY THYROID STIM HORMONE: CPT | Mod: HCNC

## 2020-02-23 PROCEDURE — 80053 COMPREHEN METABOLIC PANEL: CPT | Mod: HCNC

## 2020-02-23 PROCEDURE — 82140 ASSAY OF AMMONIA: CPT | Mod: HCNC

## 2020-02-23 PROCEDURE — 99900035 HC TECH TIME PER 15 MIN (STAT): Mod: HCNC

## 2020-02-23 PROCEDURE — 83735 ASSAY OF MAGNESIUM: CPT | Mod: HCNC

## 2020-02-23 PROCEDURE — 83880 ASSAY OF NATRIURETIC PEPTIDE: CPT | Mod: HCNC

## 2020-02-23 PROCEDURE — 83605 ASSAY OF LACTIC ACID: CPT | Mod: HCNC

## 2020-02-23 PROCEDURE — 93005 ELECTROCARDIOGRAM TRACING: CPT | Mod: HCNC

## 2020-02-23 PROCEDURE — 36600 WITHDRAWAL OF ARTERIAL BLOOD: CPT | Mod: HCNC

## 2020-02-23 PROCEDURE — 63600175 PHARM REV CODE 636 W HCPCS: Mod: HCNC | Performed by: EMERGENCY MEDICINE

## 2020-02-23 PROCEDURE — 12000002 HC ACUTE/MED SURGE SEMI-PRIVATE ROOM: Mod: HCNC

## 2020-02-23 PROCEDURE — 82803 BLOOD GASES ANY COMBINATION: CPT | Mod: HCNC

## 2020-02-23 PROCEDURE — 84484 ASSAY OF TROPONIN QUANT: CPT | Mod: HCNC

## 2020-02-23 PROCEDURE — 85025 COMPLETE CBC W/AUTO DIFF WBC: CPT | Mod: HCNC

## 2020-02-23 PROCEDURE — 87040 BLOOD CULTURE FOR BACTERIA: CPT | Mod: 59,HCNC

## 2020-02-23 PROCEDURE — 85610 PROTHROMBIN TIME: CPT | Mod: HCNC

## 2020-02-23 PROCEDURE — 83690 ASSAY OF LIPASE: CPT | Mod: HCNC

## 2020-02-23 RX ORDER — NOREPINEPHRINE BITARTRATE/D5W 8 MG/250ML
0.05 PLASTIC BAG, INJECTION (ML) INTRAVENOUS CONTINUOUS
Status: DISCONTINUED | OUTPATIENT
Start: 2020-02-24 | End: 2020-02-24

## 2020-02-23 RX ORDER — NALOXONE HCL 0.4 MG/ML
0.4 VIAL (ML) INJECTION
Status: COMPLETED | OUTPATIENT
Start: 2020-02-23 | End: 2020-02-23

## 2020-02-23 RX ADMIN — NALOXONE HYDROCHLORIDE 0.4 MG: 0.4 INJECTION, SOLUTION INTRAMUSCULAR; INTRAVENOUS; SUBCUTANEOUS at 11:02

## 2020-02-23 NOTE — Clinical Note
85 ml injected throughout the case. 65 mL total wasted during the case. 150 mL total used in the case.

## 2020-02-24 ENCOUNTER — ANESTHESIA (OUTPATIENT)
Dept: INTENSIVE CARE | Facility: HOSPITAL | Age: 75
DRG: 252 | End: 2020-02-24
Payer: MEDICARE

## 2020-02-24 ENCOUNTER — ANESTHESIA EVENT (OUTPATIENT)
Dept: INTENSIVE CARE | Facility: HOSPITAL | Age: 75
DRG: 252 | End: 2020-02-24
Payer: MEDICARE

## 2020-02-24 PROBLEM — T40.2X1A OPIOID OVERDOSE: Status: ACTIVE | Noted: 2020-02-24

## 2020-02-24 PROBLEM — E87.5 HYPERKALEMIA: Status: ACTIVE | Noted: 2020-02-24

## 2020-02-24 PROBLEM — G93.40 ACUTE ENCEPHALOPATHY: Status: ACTIVE | Noted: 2020-02-24

## 2020-02-24 PROBLEM — G93.41 ENCEPHALOPATHY, METABOLIC: Status: ACTIVE | Noted: 2020-02-24

## 2020-02-24 LAB
ALLENS TEST: ABNORMAL
AMPHET+METHAMPHET UR QL: NEGATIVE
ANION GAP SERPL CALC-SCNC: 12 MMOL/L (ref 8–16)
ANION GAP SERPL CALC-SCNC: 9 MMOL/L (ref 8–16)
BARBITURATES UR QL SCN>200 NG/ML: NEGATIVE
BASOPHILS # BLD AUTO: 0.06 K/UL (ref 0–0.2)
BASOPHILS # BLD AUTO: 0.07 K/UL (ref 0–0.2)
BASOPHILS NFR BLD: 0.8 % (ref 0–1.9)
BASOPHILS NFR BLD: 1 % (ref 0–1.9)
BENZODIAZ UR QL SCN>200 NG/ML: NORMAL
BILIRUB UR QL STRIP: NEGATIVE
BNP SERPL-MCNC: 521 PG/ML (ref 0–99)
BUN SERPL-MCNC: 45 MG/DL (ref 8–23)
BUN SERPL-MCNC: 46 MG/DL (ref 8–23)
BZE UR QL SCN: NEGATIVE
CALCIUM SERPL-MCNC: 9.1 MG/DL (ref 8.7–10.5)
CALCIUM SERPL-MCNC: 9.2 MG/DL (ref 8.7–10.5)
CANNABINOIDS UR QL SCN: NEGATIVE
CHLORIDE SERPL-SCNC: 97 MMOL/L (ref 95–110)
CHLORIDE SERPL-SCNC: 99 MMOL/L (ref 95–110)
CLARITY UR: CLEAR
CO2 SERPL-SCNC: 28 MMOL/L (ref 23–29)
CO2 SERPL-SCNC: 29 MMOL/L (ref 23–29)
COLOR UR: YELLOW
CREAT SERPL-MCNC: 3.4 MG/DL (ref 0.5–1.4)
CREAT SERPL-MCNC: 3.6 MG/DL (ref 0.5–1.4)
CREAT UR-MCNC: 89.2 MG/DL (ref 15–325)
DELSYS: ABNORMAL
DIFFERENTIAL METHOD: ABNORMAL
DIFFERENTIAL METHOD: ABNORMAL
EOSINOPHIL # BLD AUTO: 0.4 K/UL (ref 0–0.5)
EOSINOPHIL # BLD AUTO: 0.6 K/UL (ref 0–0.5)
EOSINOPHIL NFR BLD: 4.6 % (ref 0–8)
EOSINOPHIL NFR BLD: 9.1 % (ref 0–8)
ERYTHROCYTE [DISTWIDTH] IN BLOOD BY AUTOMATED COUNT: 18.7 % (ref 11.5–14.5)
ERYTHROCYTE [DISTWIDTH] IN BLOOD BY AUTOMATED COUNT: 18.8 % (ref 11.5–14.5)
EST. GFR  (AFRICAN AMERICAN): 13 ML/MIN/1.73 M^2
EST. GFR  (AFRICAN AMERICAN): 14 ML/MIN/1.73 M^2
EST. GFR  (NON AFRICAN AMERICAN): 12 ML/MIN/1.73 M^2
EST. GFR  (NON AFRICAN AMERICAN): 12 ML/MIN/1.73 M^2
GLUCOSE SERPL-MCNC: 148 MG/DL (ref 70–110)
GLUCOSE SERPL-MCNC: 68 MG/DL (ref 70–110)
GLUCOSE UR QL STRIP: NEGATIVE
HCO3 UR-SCNC: 26 MMOL/L (ref 24–28)
HCO3 UR-SCNC: 29.6 MMOL/L (ref 24–28)
HCT VFR BLD AUTO: 27.1 % (ref 37–48.5)
HCT VFR BLD AUTO: 31.8 % (ref 37–48.5)
HCT VFR BLD CALC: 32 %PCV (ref 36–54)
HGB BLD-MCNC: 11 G/DL
HGB BLD-MCNC: 7.9 G/DL (ref 12–16)
HGB BLD-MCNC: 9 G/DL (ref 12–16)
HGB UR QL STRIP: NEGATIVE
IMM GRANULOCYTES # BLD AUTO: 0.03 K/UL (ref 0–0.04)
IMM GRANULOCYTES # BLD AUTO: 0.06 K/UL (ref 0–0.04)
IMM GRANULOCYTES NFR BLD AUTO: 0.4 % (ref 0–0.5)
IMM GRANULOCYTES NFR BLD AUTO: 0.8 % (ref 0–0.5)
KETONES UR QL STRIP: NEGATIVE
LEUKOCYTE ESTERASE UR QL STRIP: NEGATIVE
LYMPHOCYTES # BLD AUTO: 0.9 K/UL (ref 1–4.8)
LYMPHOCYTES # BLD AUTO: 1.4 K/UL (ref 1–4.8)
LYMPHOCYTES NFR BLD: 11.5 % (ref 18–48)
LYMPHOCYTES NFR BLD: 21.6 % (ref 18–48)
MAGNESIUM SERPL-MCNC: 2.1 MG/DL (ref 1.6–2.6)
MCH RBC QN AUTO: 27.8 PG (ref 27–31)
MCH RBC QN AUTO: 28.3 PG (ref 27–31)
MCHC RBC AUTO-ENTMCNC: 28.3 G/DL (ref 32–36)
MCHC RBC AUTO-ENTMCNC: 29.2 G/DL (ref 32–36)
MCV RBC AUTO: 97 FL (ref 82–98)
MCV RBC AUTO: 98 FL (ref 82–98)
METHADONE UR QL SCN>300 NG/ML: NEGATIVE
MONOCYTES # BLD AUTO: 1 K/UL (ref 0.3–1)
MONOCYTES # BLD AUTO: 1.2 K/UL (ref 0.3–1)
MONOCYTES NFR BLD: 15.1 % (ref 4–15)
MONOCYTES NFR BLD: 15.5 % (ref 4–15)
NEUTROPHILS # BLD AUTO: 3.5 K/UL (ref 1.8–7.7)
NEUTROPHILS # BLD AUTO: 5.3 K/UL (ref 1.8–7.7)
NEUTROPHILS NFR BLD: 52.8 % (ref 38–73)
NEUTROPHILS NFR BLD: 66.8 % (ref 38–73)
NITRITE UR QL STRIP: NEGATIVE
NRBC BLD-RTO: 0 /100 WBC
NRBC BLD-RTO: 0 /100 WBC
OPIATES UR QL SCN: NORMAL
PCO2 BLDA: 38.7 MMHG (ref 35–45)
PCO2 BLDA: 50.8 MMHG (ref 35–45)
PCP UR QL SCN>25 NG/ML: NEGATIVE
PH SMN: 7.37 [PH] (ref 7.35–7.45)
PH SMN: 7.43 [PH] (ref 7.35–7.45)
PH UR STRIP: 6 [PH] (ref 5–8)
PHOSPHATE SERPL-MCNC: 4.7 MG/DL (ref 2.7–4.5)
PLATELET # BLD AUTO: 112 K/UL (ref 150–350)
PLATELET # BLD AUTO: 156 K/UL (ref 150–350)
PMV BLD AUTO: 10.6 FL (ref 9.2–12.9)
PMV BLD AUTO: 10.7 FL (ref 9.2–12.9)
PO2 BLDA: 64 MMHG (ref 80–100)
PO2 BLDA: 65 MMHG (ref 80–100)
POC BE: 2 MMOL/L
POC BE: 4 MMOL/L
POC IONIZED CALCIUM: 1.26 MMOL/L (ref 1.06–1.42)
POC SATURATED O2: 91 % (ref 95–100)
POC SATURATED O2: 93 % (ref 95–100)
POC TCO2: 27 MMOL/L (ref 23–27)
POC TCO2: 31 MMOL/L (ref 23–27)
POCT GLUCOSE: 104 MG/DL (ref 70–110)
POCT GLUCOSE: 207 MG/DL (ref 70–110)
POTASSIUM BLD-SCNC: 4.3 MMOL/L (ref 3.5–5.1)
POTASSIUM SERPL-SCNC: 5.3 MMOL/L (ref 3.5–5.1)
POTASSIUM SERPL-SCNC: 6.2 MMOL/L (ref 3.5–5.1)
PROCALCITONIN SERPL IA-MCNC: 0.22 NG/ML
PROT UR QL STRIP: NEGATIVE
RBC # BLD AUTO: 2.79 M/UL (ref 4–5.4)
RBC # BLD AUTO: 3.24 M/UL (ref 4–5.4)
SAMPLE: ABNORMAL
SAMPLE: ABNORMAL
SITE: ABNORMAL
SODIUM BLD-SCNC: 137 MMOL/L (ref 136–145)
SODIUM SERPL-SCNC: 134 MMOL/L (ref 136–145)
SODIUM SERPL-SCNC: 140 MMOL/L (ref 136–145)
SP GR UR STRIP: 1.01 (ref 1–1.03)
TOXICOLOGY INFORMATION: NORMAL
TSH SERPL DL<=0.005 MIU/L-ACNC: 2.19 UIU/ML (ref 0.4–4)
URN SPEC COLLECT METH UR: NORMAL
UROBILINOGEN UR STRIP-ACNC: NEGATIVE EU/DL
WBC # BLD AUTO: 6.67 K/UL (ref 3.9–12.7)
WBC # BLD AUTO: 7.86 K/UL (ref 3.9–12.7)

## 2020-02-24 PROCEDURE — 36600 WITHDRAWAL OF ARTERIAL BLOOD: CPT | Mod: HCNC

## 2020-02-24 PROCEDURE — 63600175 PHARM REV CODE 636 W HCPCS: Mod: HCNC | Performed by: EMERGENCY MEDICINE

## 2020-02-24 PROCEDURE — 27100171 HC OXYGEN HIGH FLOW UP TO 24 HOURS: Mod: HCNC

## 2020-02-24 PROCEDURE — 99900035 HC TECH TIME PER 15 MIN (STAT): Mod: HCNC

## 2020-02-24 PROCEDURE — 27000249 HC VAPOTHERM CIRCUIT: Mod: HCNC

## 2020-02-24 PROCEDURE — 63600175 PHARM REV CODE 636 W HCPCS: Mod: HCNC | Performed by: ANESTHESIOLOGY

## 2020-02-24 PROCEDURE — 25000003 PHARM REV CODE 250: Mod: HCNC | Performed by: NURSE PRACTITIONER

## 2020-02-24 PROCEDURE — 76942 ECHO GUIDE FOR BIOPSY: CPT | Mod: HCNC | Performed by: ANESTHESIOLOGY

## 2020-02-24 PROCEDURE — 63600175 PHARM REV CODE 636 W HCPCS: Mod: HCNC | Performed by: INTERNAL MEDICINE

## 2020-02-24 PROCEDURE — 85025 COMPLETE CBC W/AUTO DIFF WBC: CPT | Mod: HCNC

## 2020-02-24 PROCEDURE — 84145 PROCALCITONIN (PCT): CPT | Mod: HCNC

## 2020-02-24 PROCEDURE — 81003 URINALYSIS AUTO W/O SCOPE: CPT | Mod: HCNC,59

## 2020-02-24 PROCEDURE — 80048 BASIC METABOLIC PNL TOTAL CA: CPT | Mod: 91,HCNC

## 2020-02-24 PROCEDURE — 80307 DRUG TEST PRSMV CHEM ANLYZR: CPT | Mod: HCNC

## 2020-02-24 PROCEDURE — 83735 ASSAY OF MAGNESIUM: CPT | Mod: HCNC

## 2020-02-24 PROCEDURE — 63600175 PHARM REV CODE 636 W HCPCS: Mod: HCNC | Performed by: NURSE PRACTITIONER

## 2020-02-24 PROCEDURE — 84100 ASSAY OF PHOSPHORUS: CPT | Mod: HCNC

## 2020-02-24 PROCEDURE — 82803 BLOOD GASES ANY COMBINATION: CPT | Mod: HCNC

## 2020-02-24 PROCEDURE — 64415 NJX AA&/STRD BRCH PLXS IMG: CPT | Mod: HCNC | Performed by: ANESTHESIOLOGY

## 2020-02-24 PROCEDURE — 25000003 PHARM REV CODE 250: Mod: HCNC | Performed by: INTERNAL MEDICINE

## 2020-02-24 PROCEDURE — C1757 CATH, THROMBECTOMY/EMBOLECT: HCPCS | Mod: HCNC | Performed by: SURGERY

## 2020-02-24 PROCEDURE — 94640 AIRWAY INHALATION TREATMENT: CPT | Mod: HCNC

## 2020-02-24 PROCEDURE — 27100092 HC HIGH FLOW DELIVERY CANNULA: Mod: HCNC

## 2020-02-24 PROCEDURE — 25000242 PHARM REV CODE 250 ALT 637 W/ HCPCS: Mod: HCNC | Performed by: FAMILY MEDICINE

## 2020-02-24 PROCEDURE — 27200704 HC ULTRASOUND NDL/ECHOSTIM: Mod: HCNC | Performed by: ANESTHESIOLOGY

## 2020-02-24 PROCEDURE — 25000003 PHARM REV CODE 250: Mod: HCNC | Performed by: EMERGENCY MEDICINE

## 2020-02-24 PROCEDURE — 80048 BASIC METABOLIC PNL TOTAL CA: CPT | Mod: HCNC

## 2020-02-24 PROCEDURE — 25000242 PHARM REV CODE 250 ALT 637 W/ HCPCS: Mod: HCNC | Performed by: EMERGENCY MEDICINE

## 2020-02-24 PROCEDURE — 80100014 HC HEMODIALYSIS 1:1: Mod: HCNC

## 2020-02-24 PROCEDURE — 94761 N-INVAS EAR/PLS OXIMETRY MLT: CPT | Mod: HCNC

## 2020-02-24 PROCEDURE — 20000000 HC ICU ROOM: Mod: HCNC

## 2020-02-24 PROCEDURE — 36415 COLL VENOUS BLD VENIPUNCTURE: CPT | Mod: HCNC

## 2020-02-24 RX ORDER — SODIUM CHLORIDE 0.9 % (FLUSH) 0.9 %
10 SYRINGE (ML) INJECTION
Status: DISCONTINUED | OUTPATIENT
Start: 2020-02-24 | End: 2020-02-29 | Stop reason: HOSPADM

## 2020-02-24 RX ORDER — FUROSEMIDE 10 MG/ML
120 INJECTION INTRAMUSCULAR; INTRAVENOUS ONCE
Status: COMPLETED | OUTPATIENT
Start: 2020-02-24 | End: 2020-02-24

## 2020-02-24 RX ORDER — FUROSEMIDE 10 MG/ML
120 INJECTION INTRAMUSCULAR; INTRAVENOUS
Status: COMPLETED | OUTPATIENT
Start: 2020-02-24 | End: 2020-02-24

## 2020-02-24 RX ORDER — INDOMETHACIN 25 MG/1
50 CAPSULE ORAL
Status: COMPLETED | OUTPATIENT
Start: 2020-02-24 | End: 2020-02-24

## 2020-02-24 RX ORDER — ALBUTEROL SULFATE 2.5 MG/.5ML
10 SOLUTION RESPIRATORY (INHALATION)
Status: COMPLETED | OUTPATIENT
Start: 2020-02-24 | End: 2020-02-24

## 2020-02-24 RX ORDER — ACETAMINOPHEN 325 MG/1
650 TABLET ORAL EVERY 4 HOURS PRN
Status: DISCONTINUED | OUTPATIENT
Start: 2020-02-24 | End: 2020-02-29 | Stop reason: HOSPADM

## 2020-02-24 RX ORDER — SODIUM CHLORIDE 9 MG/ML
INJECTION, SOLUTION INTRAVENOUS
Status: DISCONTINUED | OUTPATIENT
Start: 2020-02-24 | End: 2020-02-29 | Stop reason: HOSPADM

## 2020-02-24 RX ORDER — DEXTROSE 50 % IN WATER (D50W) INTRAVENOUS SYRINGE
25
Status: COMPLETED | OUTPATIENT
Start: 2020-02-24 | End: 2020-02-24

## 2020-02-24 RX ORDER — NALOXONE HCL 0.4 MG/ML
0.4 VIAL (ML) INJECTION
Status: COMPLETED | OUTPATIENT
Start: 2020-02-24 | End: 2020-02-24

## 2020-02-24 RX ORDER — MUPIROCIN 20 MG/G
OINTMENT TOPICAL
Status: DISCONTINUED | OUTPATIENT
Start: 2020-02-24 | End: 2020-02-27 | Stop reason: HOSPADM

## 2020-02-24 RX ORDER — ROPIVACAINE HYDROCHLORIDE 2 MG/ML
INJECTION, SOLUTION EPIDURAL; INFILTRATION; PERINEURAL
Status: DISCONTINUED | OUTPATIENT
Start: 2020-02-24 | End: 2020-02-27 | Stop reason: HOSPADM

## 2020-02-24 RX ORDER — SODIUM CHLORIDE 9 MG/ML
INJECTION, SOLUTION INTRAVENOUS ONCE
Status: COMPLETED | OUTPATIENT
Start: 2020-02-24 | End: 2020-02-24

## 2020-02-24 RX ORDER — ONDANSETRON 2 MG/ML
4 INJECTION INTRAMUSCULAR; INTRAVENOUS EVERY 8 HOURS PRN
Status: DISCONTINUED | OUTPATIENT
Start: 2020-02-24 | End: 2020-02-29 | Stop reason: HOSPADM

## 2020-02-24 RX ORDER — METOLAZONE 2.5 MG/1
10 TABLET ORAL ONCE
Status: COMPLETED | OUTPATIENT
Start: 2020-02-24 | End: 2020-02-24

## 2020-02-24 RX ORDER — IPRATROPIUM BROMIDE AND ALBUTEROL SULFATE 2.5; .5 MG/3ML; MG/3ML
3 SOLUTION RESPIRATORY (INHALATION) EVERY 6 HOURS PRN
Status: DISCONTINUED | OUTPATIENT
Start: 2020-02-24 | End: 2020-02-29 | Stop reason: HOSPADM

## 2020-02-24 RX ORDER — FUROSEMIDE 10 MG/ML
120 INJECTION INTRAMUSCULAR; INTRAVENOUS ONCE
Status: DISCONTINUED | OUTPATIENT
Start: 2020-02-24 | End: 2020-02-24

## 2020-02-24 RX ADMIN — NALOXONE HYDROCHLORIDE 0.25 MG/HR: 1 INJECTION PARENTERAL at 10:02

## 2020-02-24 RX ADMIN — NALOXONE HYDROCHLORIDE 0.4 MG: 0.4 INJECTION, SOLUTION INTRAMUSCULAR; INTRAVENOUS; SUBCUTANEOUS at 03:02

## 2020-02-24 RX ADMIN — FUROSEMIDE 120 MG: 10 INJECTION, SOLUTION INTRAMUSCULAR; INTRAVENOUS at 08:02

## 2020-02-24 RX ADMIN — NALOXONE HYDROCHLORIDE 0.25 MG/HR: 1 INJECTION PARENTERAL at 12:02

## 2020-02-24 RX ADMIN — ROPIVACAINE HYDROCHLORIDE 10 ML: 2 INJECTION, SOLUTION EPIDURAL; INFILTRATION at 09:02

## 2020-02-24 RX ADMIN — FUROSEMIDE 120 MG: 10 INJECTION, SOLUTION INTRAVENOUS at 01:02

## 2020-02-24 RX ADMIN — NALOXONE HYDROCHLORIDE 0.25 MG/HR: 1 INJECTION PARENTERAL at 06:02

## 2020-02-24 RX ADMIN — ALBUTEROL SULFATE 10 MG: 2.5 SOLUTION RESPIRATORY (INHALATION) at 12:02

## 2020-02-24 RX ADMIN — NALOXONE HYDROCHLORIDE 0.4 MG: 0.4 INJECTION, SOLUTION INTRAMUSCULAR; INTRAVENOUS; SUBCUTANEOUS at 12:02

## 2020-02-24 RX ADMIN — INSULIN HUMAN 10 UNITS: 100 INJECTION, SOLUTION PARENTERAL at 01:02

## 2020-02-24 RX ADMIN — IPRATROPIUM BROMIDE AND ALBUTEROL SULFATE 3 ML: .5; 3 SOLUTION RESPIRATORY (INHALATION) at 02:02

## 2020-02-24 RX ADMIN — METOLAZONE 10 MG: 2.5 TABLET ORAL at 08:02

## 2020-02-24 RX ADMIN — ACETAMINOPHEN 650 MG: 325 TABLET ORAL at 12:02

## 2020-02-24 RX ADMIN — EPOETIN ALFA-EPBX 5000 UNITS: 3000 INJECTION, SOLUTION INTRAVENOUS; SUBCUTANEOUS at 02:02

## 2020-02-24 RX ADMIN — SODIUM CHLORIDE: 0.9 INJECTION, SOLUTION INTRAVENOUS at 02:02

## 2020-02-24 RX ADMIN — SODIUM BICARBONATE 50 MEQ: 84 INJECTION, SOLUTION INTRAVENOUS at 01:02

## 2020-02-24 RX ADMIN — ACETAMINOPHEN 650 MG: 325 TABLET ORAL at 05:02

## 2020-02-24 RX ADMIN — DEXTROSE MONOHYDRATE 25 G: 25 INJECTION, SOLUTION INTRAVENOUS at 12:02

## 2020-02-24 NOTE — ANESTHESIA PROCEDURE NOTES
Peripheral Block    Patient location during procedure: ICU   Block not for primary anesthetic.  Reason for block: at surgeon's request and post-op pain management   Post-op Pain Location: left arm pain  Start time: 2/24/2020 11:08 AM  Timeout: 2/24/2020 11:08 AM   End time: 2/24/2020 11:12 AM    Staffing  Authorizing Provider: Tin Cid MD  Performing Provider: Siena Mitchell MD    Preanesthetic Checklist  Completed: patient identified, site marked, surgical consent, pre-op evaluation, timeout performed, IV checked, risks and benefits discussed and monitors and equipment checked  Peripheral Block  Patient position: supine  Prep: ChloraPrep  Patient monitoring: heart rate, cardiac monitor, continuous pulse ox, continuous capnometry and frequent blood pressure checks  Block type: supraclavicular  Laterality: left  Injection technique: single shot  Needle  Needle type: Stimuplex   Needle gauge: 22 G  Needle length: 2 in  Needle localization: anatomical landmarks and ultrasound guidance   -ultrasound image captured on disc.  Assessment  Injection assessment: negative aspiration, negative parasthesia and local visualized surrounding nerve  Paresthesia pain: none  Heart rate change: no  Slow fractionated injection: yes  Additional Notes  VSS.Vitals monitored with ICU throughout procedure.  Patient tolerated procedure well.

## 2020-02-24 NOTE — PROGRESS NOTES
e-ICU admit note      Reason for ICU admission:       HPI:      73 yo female  Presents with MS change. Occurring past 2 hrs PTA.  Information given by .     Patient recently started on dialysis and has not dialyzed in 1 week since her dialysis access has not been functioning.    Initial VS:    63/29, RR 14, HR 70, 93%      PHx:     COPD  chronic pain   lumbar spondylosis   vitamin D deficiency   anemia   migraines   osteoarthritis   HTN    PE with infarction   CKD stage IV- new on HD   seizures- 1972   acute CHF- 2/10/20      Home Meds:    Duoneb  allpurinol  Amlodipine  Clonazepam  Buspirone  Lasix 80 mg bid  Norco 1 tid prn  zofran  Tamiflu  KCl 20 meq  Ambien        Significant labs:    Hb 7.9  K 6.2  BUN 46  Creat 3.8  7.26/65/42/30        Significant images:    CT head:    No acute intracranial abnormality.    CXR:    Persistent opacity at the left lung base likely relating to pleural fluid and atelectasis, the possibly of superimposed infiltrate is a consideration as well.  The overall appearance is thought stable without a large degree of interval change.        I viewed the pt via camera at  4:20 am:    On high flow nc 25L/min, 50%    Pt asleep    NAD    Sat 100%, 90 sinus, RR 23, 91/54       Assessment/Plan:      Acute on chronic hypercapnic and hypoxic respiratory failure  On high flow O2, tolerating well    RLL infiltrate , maybe effusion  No leukocytosis  Check Procalcitonin  Hold off antibxs for now unless fever develops  US right hemithorax    Mental status change  Maybe multifactorial  Low O2 sat, hypercapnia, uremia  CT head normal  R/o opioid OD- naloxone  tox screen + opioids, benzos  would avoid flumazenil due to hx seizures    ESRD  Non functioning HD access  Needs review  Last HD 1 week ago  Maximoro to see    HyperK  Kayexalate, D50 + insulin, NaHCO3  Emergent HD if does not correcte

## 2020-02-24 NOTE — EICU
Rounding (Video Assessment):  No    Intervention Initiated From:  Bedside    Francisco Communicated with Bedside Nurse regarding:  Time-Out    Nurse Notified:  No    Doctor Notified:  No    Comments: Nerve Block Performed

## 2020-02-24 NOTE — ANESTHESIA PREPROCEDURE EVALUATION
02/24/2020  Katie Quevedo is a 74 y.o., female with hx of ESRD recently started on HD, admitted with MS change and inability to have HD  Due to malfunctioning access. Here for thrombectomy of L HD access.    In ICU    Last K at 300 am 5.3 down down from > 6  .  Past Medical History:   Diagnosis Date    Acute congestive heart failure 2/10/2020    Anemia     Bilateral renal cysts     Cataract     Chronic LBP 7/26/2012    Chronic pain     CKD (chronic kidney disease), stage IV     COPD (chronic obstructive pulmonary disease)     Dehydration     Encounter for blood transfusion     HTN (hypertension)     Lumbar spondylosis     Melanoma     of the lip    Metabolic bone disease     Migraines, neuralgic     Osteoporosis     Primary osteoarthritis of both knees     s/p Rt TKA    Pulmonary embolism with infarction     Seizures 1972    x1 only    Subdeltoid bursitis, L>R. 9/27/2012    Ulcer     Vitamin D deficiency disease      Past Surgical History:   Procedure Laterality Date    BLADDER SUSPENSION      CATARACT EXTRACTION  11/18/13    left eye    CERVICAL LAMINECTOMY      x3, fusion x1    COLONOSCOPY  2009    HYSTERECTOMY      JOINT REPLACEMENT  2001    total right knee     LUMBAR LAMINECTOMY      x 3, fusion x1    OOPHORECTOMY      PLACEMENT OF ARTERIOVENOUS GRAFT Left 1/21/2020    Procedure: INSERTION, GRAFT, ARTERIOVENOUS;  Surgeon: Lindsey Louie MD;  Location: [facility] OR;  Service: General;  Laterality: Left;    THROMBECTOMY Left 2/3/2020    Procedure: THROMBECTOMY;  Surgeon: Lindsey Louie MD;  Location: [facility] OR;  Service: General;  Laterality: Left;     No current facility-administered medications on file prior to encounter.      Current Outpatient Medications on File Prior to Encounter   Medication Sig Dispense Refill    albuterol-ipratropium (DUO-NEB) 2.5 mg-0.5 mg/3  mL nebulizer solution Take 3 mLs by nebulization every 6 (six) hours as needed for Shortness of Breath. Rescue 3 Box 5    allopurinoL (ZYLOPRIM) 100 MG tablet Take 1 tablet (100 mg total) by mouth on Tuesday, Thursday, Saturday, and Sunday. 30 tablet 0    amLODIPine (NORVASC) 2.5 MG tablet Take 2.5 mg by mouth once daily.       busPIRone (BUSPAR) 15 MG tablet Take 1 tablet (15 mg total) by mouth 2 (two) times daily. 60 tablet 3    clonazePAM (KLONOPIN) 1 MG tablet Take 1 tablet (1 mg total) by mouth 2 (two) times daily as needed for Anxiety. 28 tablet 0    cyproheptadine (PERIACTIN) 4 mg tablet Take 1 tablet (4 mg total) by mouth every 8 (eight) hours. 24 tablet 0    diphenhydrAMINE (BENADRYL) 25 mg capsule Take 25 mg by mouth every 6 (six) hours as needed for Itching.      ergocalciferol (ERGOCALCIFEROL) 50,000 unit Cap Take 1 capsule by mouth once weekly for 10 weeks, then take 1 capsule by mouth once monthly. 30 capsule 0    fluticasone-umeclidin-vilanter (TRELEGY ELLIPTA) 100-62.5-25 mcg DsDv Inhale 1 puff by mouth into the lungs once daily. 90 each 3    furosemide (LASIX) 80 MG tablet Take 1 tablet (80 mg total) by mouth twice daily on non dialysis days Tuesday, Thursday, Saturday, and Sunday. 32 tablet 0    furosemide (LASIX) 80 MG tablet Take 1 tablet (80 mg total) by mouth 2 (two) times daily. 60 tablet 11    HYDROcodone-acetaminophen (NORCO)  mg per tablet Take 1 tablet by mouth 3 (three) times daily as needed. 90 tablet 0    levoFLOXacin (LEVAQUIN) 750 MG tablet Take 1 tablet (750 mg total) by mouth once daily. 7 tablet 0    lidocaine-prilocaine (EMLA) cream Apply topically to left arm prior to dialysis. 30 g 0    mupirocin (BACTROBAN) 2 % ointment apply by Nasal route 2 (two) times daily. 22 g 0    ondansetron (ZOFRAN-ODT) 4 MG TbDL Dissolve one tablet under the tongue every 6 (six) hours as needed. 30 tablet 3    oseltamivir (TAMIFLU) 30 MG capsule Take 1 capsule (30 mg total) by  "mouth once daily. for 4 days 4 capsule 0    potassium chloride SA (K-DUR,KLOR-CON) 20 MEQ tablet Take 2 tablets (40 mEq total) by mouth once daily. 20 tablet 0    sodium bicarbonate 650 MG tablet Take 1 tablet (650 mg total) by mouth 2 (two) times daily. 120 tablet 11    traZODone (DESYREL) 100 MG tablet Take 100 mg by mouth nightly as needed for Insomnia.      zolpidem (AMBIEN) 5 MG Tab Take 5 mg by mouth every evening.   5     Vitals:    02/24/20 0400 02/24/20 0415 02/24/20 0501 02/24/20 0600   BP: (!) 89/50 (!) 91/54 (!) 82/53 (!) 99/54   BP Location:       Patient Position:       Pulse: 86 92 80 80   Resp: (!) 26 (!) 24 (!) 23 (!) 25   Temp:       TempSrc:       SpO2: 100% 100% 100% 100%   Weight:   58.9 kg (129 lb 13.6 oz)    Height:   5' 5" (1.651 m)      Recent Labs   Lab 02/24/20 0357   WBC 7.86   RBC 3.24*   HGB 9.0*   HCT 31.8*      MCV 98   MCH 27.8   MCHC 28.3*       Chemistry        Component Value Date/Time     02/24/2020 0357    K 5.3 (H) 02/24/2020 0357    CL 99 02/24/2020 0357    CO2 29 02/24/2020 0357    BUN 46 (H) 02/24/2020 0357    CREATININE 3.6 (H) 02/24/2020 0357    GLU 68 (L) 02/24/2020 0357        Component Value Date/Time    CALCIUM 9.2 02/24/2020 0357    ALKPHOS 107 02/23/2020 2312    AST 22 02/23/2020 2312    ALT 9 (L) 02/23/2020 2312    BILITOT 0.3 02/23/2020 2312    ESTGFRAFRICA 13 (A) 02/24/2020 0357    EGFRNONAA 12 (A) 02/24/2020 0357          Anesthesia Evaluation    I have reviewed the Patient Summary Reports.    I have reviewed the Nursing Notes.   I have reviewed the Medications.     Review of Systems  Anesthesia Hx:  No problems with previous Anesthesia  History of prior surgery of interest to airway management or planning: cervical fusion. Previous anesthesia: General  Denies Personal Hx of Anesthesia complications.   Cardiovascular:   Exercise tolerance: poor Hypertension CHF EKG 2/19/20    Sinus tachycardia  Rightward axis  Low voltage QRS  Nonspecific T wave " abnormality  Septal infarct (cited on or before 19-JUN-2019)  Abnormal ECG   Pulmonary:   COPD, severe On 2L home oxygen   Renal/:   Chronic Renal Disease, ESRD, Dialysis    Hepatic/GI:  Hepatic/GI Normal    Musculoskeletal:   Arthritis     Neurological:   Neuromuscular Disease, Headaches Seizures        Physical Exam  General:  Well nourished    Airway/Jaw/Neck:  Airway Findings: Mouth Opening: Normal Tongue: Normal  General Airway Assessment: Adult, Average  Mallampati: III  Improves to III with phonation.  TM Distance: 4 - 6 cm  Jaw/Neck Findings:     Neck ROM: Extension Decreased, Mild  Neck Findings:  Girth Increased       Chest/Lungs:  Chest/Lungs Findings: Rhonchi     Heart/Vascular:  Heart Findings: Rate: Normal  Rhythm: Regular Rhythm        Mental Status:  Mental Status Findings:  Cooperative, Alert and Oriented         Anesthesia Plan  Type of Anesthesia, risks & benefits discussed:  Anesthesia Type:  regional, MAC  Patient's Preference:   Intra-op Monitoring Plan: standard ASA monitors and arterial line  Intra-op Monitoring Plan Comments:   Post Op Pain Control Plan: multimodal analgesia  Post Op Pain Control Plan Comments:   Induction:   IV  Beta Blocker:  Patient is not currently on a Beta-Blocker (No further documentation required).       Informed Consent: Patient understands risks and agrees with Anesthesia plan.  Questions answered. Anesthesia consent signed with patient representative.  ASA Score: 4  emergent   Day of Surgery Review of History & Physical:            Ready For Surgery From Anesthesia Perspective.

## 2020-02-24 NOTE — ED NOTES
Patient is very restless, anxious and agitated. Keeps requesting pain medication but informed that she cannot have anything because of her altered mental status. Informed Dr. Rousseau about pt agitation, so unable to give PO Sodium polystyrene sulfonate and that patient is not going to be still right now for an ultrasound

## 2020-02-24 NOTE — CONSULTS
NEPHROLOGY CONSULT NOTE    HPI & INTERVAL HISTORY:    Past Medical History:   Diagnosis Date    Acute congestive heart failure 2/10/2020    Anemia     Bilateral renal cysts     Cataract     Chronic LBP 7/26/2012    Chronic pain     CKD (chronic kidney disease), stage IV     COPD (chronic obstructive pulmonary disease)     Dehydration     Encounter for blood transfusion     HTN (hypertension)     Lumbar spondylosis     Melanoma     of the lip    Metabolic bone disease     Migraines, neuralgic     Osteoporosis     Primary osteoarthritis of both knees     s/p Rt TKA    Pulmonary embolism with infarction     Seizures 1972    x1 only    Subdeltoid bursitis, L>R. 9/27/2012    Ulcer     Vitamin D deficiency disease       Past Surgical History:   Procedure Laterality Date    BLADDER SUSPENSION      CATARACT EXTRACTION  11/18/13    left eye    CERVICAL LAMINECTOMY      x3, fusion x1    COLONOSCOPY  2009    HYSTERECTOMY      JOINT REPLACEMENT  2001    total right knee     LUMBAR LAMINECTOMY      x 3, fusion x1    OOPHORECTOMY      PLACEMENT OF ARTERIOVENOUS GRAFT Left 1/21/2020    Procedure: INSERTION, GRAFT, ARTERIOVENOUS;  Surgeon: Lindsey Louie MD;  Location: [facility] OR;  Service: General;  Laterality: Left;    THROMBECTOMY Left 2/3/2020    Procedure: THROMBECTOMY;  Surgeon: Lindsey Louie MD;  Location: [facility] OR;  Service: General;  Laterality: Left;      Review of patient's allergies indicates:   Allergen Reactions    Aspirin      Other reaction(s): hx of ulcers    Tetracycline Swelling     Other reaction(s): Swelling    Penicillins Rash     Other reaction(s): Hives  Other reaction(s): Rash  Other reaction(s): Rash  Other reaction(s): Hives      Medications Prior to Admission   Medication Sig Dispense Refill Last Dose    albuterol-ipratropium (DUO-NEB) 2.5 mg-0.5 mg/3 mL nebulizer solution Take 3 mLs by nebulization every 6 (six) hours as needed for Shortness of Breath. Rescue  3 Box 5 2/19/2020 at Unknown time    allopurinoL (ZYLOPRIM) 100 MG tablet Take 1 tablet (100 mg total) by mouth on Tuesday, Thursday, Saturday, and Sunday. 30 tablet 0     amLODIPine (NORVASC) 2.5 MG tablet Take 2.5 mg by mouth once daily.    2/18/2020 at Unknown time    busPIRone (BUSPAR) 15 MG tablet Take 1 tablet (15 mg total) by mouth 2 (two) times daily. 60 tablet 3 2/19/2020 at Unknown time    clonazePAM (KLONOPIN) 1 MG tablet Take 1 tablet (1 mg total) by mouth 2 (two) times daily as needed for Anxiety. 28 tablet 0 2/18/2020 at Unknown time    cyproheptadine (PERIACTIN) 4 mg tablet Take 1 tablet (4 mg total) by mouth every 8 (eight) hours. 24 tablet 0 More than a month at Unknown time    diphenhydrAMINE (BENADRYL) 25 mg capsule Take 25 mg by mouth every 6 (six) hours as needed for Itching.   2/18/2020 at Unknown time    ergocalciferol (ERGOCALCIFEROL) 50,000 unit Cap Take 1 capsule by mouth once weekly for 10 weeks, then take 1 capsule by mouth once monthly. 30 capsule 0     fluticasone-umeclidin-vilanter (TRELEGY ELLIPTA) 100-62.5-25 mcg DsDv Inhale 1 puff by mouth into the lungs once daily. 90 each 3 2/19/2020 at Unknown time    furosemide (LASIX) 80 MG tablet Take 1 tablet (80 mg total) by mouth twice daily on non dialysis days Tuesday, Thursday, Saturday, and Sunday. 32 tablet 0     furosemide (LASIX) 80 MG tablet Take 1 tablet (80 mg total) by mouth 2 (two) times daily. 60 tablet 11     HYDROcodone-acetaminophen (NORCO)  mg per tablet Take 1 tablet by mouth 3 (three) times daily as needed. 90 tablet 0     levoFLOXacin (LEVAQUIN) 750 MG tablet Take 1 tablet (750 mg total) by mouth once daily. 7 tablet 0 2/19/2020 at Unknown time    lidocaine-prilocaine (EMLA) cream Apply topically to left arm prior to dialysis. 30 g 0     mupirocin (BACTROBAN) 2 % ointment apply by Nasal route 2 (two) times daily. 22 g 0 2/19/2020 at Unknown time    ondansetron (ZOFRAN-ODT) 4 MG TbDL Dissolve one  tablet under the tongue every 6 (six) hours as needed. 30 tablet 3 2/19/2020 at Unknown time    oseltamivir (TAMIFLU) 30 MG capsule Take 1 capsule (30 mg total) by mouth once daily. for 4 days 4 capsule 0     potassium chloride SA (K-DUR,KLOR-CON) 20 MEQ tablet Take 2 tablets (40 mEq total) by mouth once daily. 20 tablet 0     sodium bicarbonate 650 MG tablet Take 1 tablet (650 mg total) by mouth 2 (two) times daily. 120 tablet 11 More than a month at Unknown time    traZODone (DESYREL) 100 MG tablet Take 100 mg by mouth nightly as needed for Insomnia.   2/18/2020 at Unknown time    zolpidem (AMBIEN) 5 MG Tab Take 5 mg by mouth every evening.   5 More than a month at Unknown time       Social History     Socioeconomic History    Marital status:      Spouse name: Not on file    Number of children: Not on file    Years of education: Not on file    Highest education level: Not on file   Occupational History    Not on file   Social Needs    Financial resource strain: Not on file    Food insecurity:     Worry: Not on file     Inability: Not on file    Transportation needs:     Medical: Not on file     Non-medical: Not on file   Tobacco Use    Smoking status: Former Smoker     Packs/day: 1.00     Types: Cigarettes    Smokeless tobacco: Former User     Quit date: 2/3/2015   Substance and Sexual Activity    Alcohol use: Not Currently     Comment: Rare    Drug use: No    Sexual activity: Never     Partners: Male   Lifestyle    Physical activity:     Days per week: Not on file     Minutes per session: Not on file    Stress: Not on file   Relationships    Social connections:     Talks on phone: Not on file     Gets together: Not on file     Attends Nondenominational service: Not on file     Active member of club or organization: Not on file     Attends meetings of clubs or organizations: Not on file     Relationship status: Not on file   Other Topics Concern    Are you pregnant or think you may be? Not  Asked    Breast-feeding Not Asked   Social History Narrative    Not on file        MEDS   sodium chloride 0.9%   Intravenous Once    epoetin hemant-epbx  5,000 Units Intravenous Every Mon, Wed, Fri    sodium chloride 0.9%  1,000 mL Intravenous ED 1 Time    sodium polystyrene  15 g Oral ED 1 Time                CONTINOUS INFUSIONS:      Intake/Output Summary (Last 24 hours) at 2/24/2020 1106  Last data filed at 2/24/2020 1057  Gross per 24 hour   Intake --   Output 750 ml   Net -750 ml        HEMODYNAMICS:    Temp:  [97.6 °F (36.4 °C)-98.2 °F (36.8 °C)] 97.6 °F (36.4 °C)  Pulse:  [] 85  Resp:  [7-48] 36  SpO2:  [93 %-100 %] 100 %  BP: ()/(29-79) 104/59   Gen: NAD   SOB  Somnolent  Weak  No CP  No cough  No fever  No chills  No nausea  No vomiting  No diarrhea    Cards:Pulse 62  Pul: diminished breath sounds  Abdomen soft   Ext: edema   Skin: dry  Dialysis Access:  Left arm AV graft    LABS   Lab Results   Component Value Date    WBC 7.86 02/24/2020    HGB 9.0 (L) 02/24/2020    HCT 31.8 (L) 02/24/2020    MCV 98 02/24/2020     02/24/2020        Recent Labs   Lab 02/23/20  2312  02/24/20  0818   GLU 86   < > 148*   CALCIUM 9.3   < > 9.1   ALBUMIN 3.0*  --   --    PROT 6.7  --   --       < > 134*   K 6.2*   < > 6.2*   CO2 32*   < > 28   CL 97   < > 97   BUN 46*   < > 45*   CREATININE 3.8*   < > 3.4*   ALKPHOS 107  --   --    ALT 9*  --   --    AST 22  --   --    BILITOT 0.3  --   --     < > = values in this interval not displayed.      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 The estimated ejection fraction is 55%  · Grade II (moderate) left ventricular diastolic dysfunction consistent with pseudonormalization.  · Elevated left atrial pressure.  · No wall motion abnormalities.  · Normal right ventricular systolic function.  · No TR Jet to calculate PA pressure  · Intermediate central venous pressure (8 mm Hg).  · There is a large left pleural effusion.       Dialysis today  US 2/14         Patent left upper extremity arteriovenous graft.    There is increased velocity at the distal/venous anastomosis concerning for greater than 50% stenosis.    Adequate flow volume.   US 2/24  Interval improvement, there is no evidence for occlusion or severe stenosis on this examination.  Dr Louie consulted to evaluate AV graft

## 2020-02-24 NOTE — ED PROVIDER NOTES
Encounter Date: 2/23/2020    SCRIBE #1 NOTE: I, Lissette Leonardo, am scribing for, and in the presence of,  Rebecca Rousseau MD. I have scribed the entire note.       History     Chief Complaint   Patient presents with    Altered Mental Status     Patient presents to ED secondary to altered mental status.  at bedside states patient after eating dinner 2-3 hours ago patient became unresponsive. Has been off of eliquis x7 days and has not received dialysis in one week. Patient arrived on 15L non rebreather.      Time seen by provider: 10:51 PM    This is a 75 y/o female with PMHx of COPD on 2 liters home oxygen at baseline, chronic pain, lumbar spondylosis, vitamin D deficiency dx, anemia, migraines, osteoarthritis, HTN,  PE with infarction, CKD stage IV, seizures, and acute CHF, who presents in AMS state for the past 2 hours. The patient's is therefore limited to the EMS report, her 's observations, and medical records. Her  notes that she had eaten dinner well without any problems and later became unresponsive. Patient recently started on dialysis and has not dialyzed in 1 week since her dialysis access has not been functioning.  She is scheduled to have her access de-clotted 2/24 AM.                The history is provided by the spouse, the EMS personnel and medical records.     Review of patient's allergies indicates:   Allergen Reactions    Aspirin      Other reaction(s): hx of ulcers    Tetracycline Swelling     Other reaction(s): Swelling    Penicillins Rash     Other reaction(s): Hives  Other reaction(s): Rash  Other reaction(s): Rash  Other reaction(s): Hives     Past Medical History:   Diagnosis Date    Acute congestive heart failure 2/10/2020    Anemia     Bilateral renal cysts     Cataract     Chronic LBP 7/26/2012    Chronic pain     CKD (chronic kidney disease), stage IV     COPD (chronic obstructive pulmonary disease)     Dehydration     Encounter for blood transfusion      HTN (hypertension)     Lumbar spondylosis     Melanoma     of the lip    Metabolic bone disease     Migraines, neuralgic     Osteoporosis     Primary osteoarthritis of both knees     s/p Rt TKA    Pulmonary embolism with infarction     Seizures 1972    x1 only    Subdeltoid bursitis, L>R. 9/27/2012    Ulcer     Vitamin D deficiency disease      Past Surgical History:   Procedure Laterality Date    BLADDER SUSPENSION      CATARACT EXTRACTION  11/18/13    left eye    CERVICAL LAMINECTOMY      x3, fusion x1    COLONOSCOPY  2009    HYSTERECTOMY      JOINT REPLACEMENT  2001    total right knee     LUMBAR LAMINECTOMY      x 3, fusion x1    OOPHORECTOMY      PLACEMENT OF ARTERIOVENOUS GRAFT Left 1/21/2020    Procedure: INSERTION, GRAFT, ARTERIOVENOUS;  Surgeon: Lindsey Louie MD;  Location: [facility] OR;  Service: General;  Laterality: Left;    THROMBECTOMY Left 2/3/2020    Procedure: THROMBECTOMY;  Surgeon: Lindsey Louie MD;  Location: [facility] OR;  Service: General;  Laterality: Left;     Family History   Problem Relation Age of Onset    Arthritis Mother     Stroke Mother     Hypertension Father     Cancer Father     Cataracts Father     Diabetes Maternal Aunt     Hypertension Maternal Grandfather     Heart disease Maternal Grandfather     Heart attack Maternal Grandfather     Cataracts Sister     Glaucoma Cousin      Social History     Tobacco Use    Smoking status: Former Smoker     Packs/day: 1.00     Types: Cigarettes    Smokeless tobacco: Former User     Quit date: 2/3/2015   Substance Use Topics    Alcohol use: Not Currently     Comment: Rare    Drug use: No     Review of Systems   Unable to perform ROS: Mental status change       Physical Exam     Initial Vitals   BP Pulse Resp Temp SpO2   02/23/20 2256 02/23/20 2256 02/23/20 2256 02/23/20 2317 02/23/20 2256   (!) 131/59 71 14 98.2 °F (36.8 °C) (!) 93 %      MAP       --                Physical Exam    Nursing note and  vitals reviewed.  Constitutional: Face mask in place.   This elderly female  On 15L non rebreather  Decreased responsiveness, but responds to sternal rub   HENT:   Head: Normocephalic and atraumatic.   Right Ear: Tympanic membrane and external ear normal.   Left Ear: Tympanic membrane and external ear normal.   Nose: Nose normal.   Eyes: Conjunctivae and EOM are normal. Pupils are equal, round, and reactive to light.   Neck: Normal range of motion. Neck supple.   No meningismus   Cardiovascular: Normal rate, regular rhythm and normal heart sounds. Exam reveals no gallop and no friction rub.    No murmur heard.  Pulmonary/Chest: She has no wheezes. She has no rhonchi. She has rales (crackles bilaterally).   Abdominal: Soft. Bowel sounds are normal. There is no tenderness. There is no rebound and no guarding.   Musculoskeletal: Normal range of motion. She exhibits no edema or tenderness.   Neurological:   Responds to sternal rub   Skin: Skin is warm and dry. Capillary refill takes less than 2 seconds. No rash noted.         ED Course   Critical Care  Date/Time: 2/24/2020 1:04 AM  Performed by: Rebecca Rousseau MD  Authorized by: Rebecca Rousseau MD   Direct patient critical care time: 15 minutes  Additional history critical care time: 15 minutes  Ordering / reviewing critical care time: 8 minutes  Total critical care time (exclusive of procedural time) : 38 minutes  Critical care was necessary to treat or prevent imminent or life-threatening deterioration of the following conditions: CNS failure or compromise, respiratory failure and renal failure.  Critical care was time spent personally by me on the following activities: blood draw for specimens, discussions with consultants, evaluation of patient's response to treatment, obtaining history from patient or surrogate, ordering and review of laboratory studies, pulse oximetry, review of old charts, development of treatment plan with patient or surrogate,  examination of patient, ordering and performing treatments and interventions, ordering and review of radiographic studies and re-evaluation of patient's condition.    External Jugular IV  Date/Time: 2/24/2020 2:17 AM  Performed by: Rebecca Rousseau MD  Authorized by: Rebecca Rousseau MD   Location (Ext Jugular): Left.  Area Prepped With: Chlorohexidine.  Catheter Size: 20 ga.  Number of attempts: 1  Fixation/Dressing: Taped in place.  Patient tolerance: Patient tolerated the procedure well with no immediate complications        Labs Reviewed   CBC W/ AUTO DIFFERENTIAL - Abnormal; Notable for the following components:       Result Value    RBC 2.79 (*)     Hemoglobin 7.9 (*)     Hematocrit 27.1 (*)     Mean Corpuscular Hemoglobin Conc 29.2 (*)     RDW 18.8 (*)     Platelets 112 (*)     Eos # 0.6 (*)     Mono% 15.1 (*)     Eosinophil% 9.1 (*)     All other components within normal limits   COMPREHENSIVE METABOLIC PANEL - Abnormal; Notable for the following components:    Potassium 6.2 (*)     CO2 32 (*)     BUN, Bld 46 (*)     Creatinine 3.8 (*)     Albumin 3.0 (*)     ALT 9 (*)     eGFR if  13 (*)     eGFR if non  11 (*)     All other components within normal limits   B-TYPE NATRIURETIC PEPTIDE - Abnormal; Notable for the following components:     (*)     All other components within normal limits   POCT GLUCOSE - Abnormal; Notable for the following components:    POCT Glucose 123 (*)     All other components within normal limits   ISTAT PROCEDURE - Abnormal; Notable for the following components:    POC PH 7.584 (*)     POC PCO2 31.2 (*)     POC HCO3 29.5 (*)     POC TCO2 30 (*)     All other components within normal limits   ISTAT PROCEDURE - Abnormal; Notable for the following components:    POC PH 7.775 (*)     POC PCO2 20.0 (*)     POC PO2 182 (*)     POC HCO3 29.4 (*)     POC Sodium 131 (*)     POC Potassium 5.9 (*)     POC TCO2 30 (*)     POC Hematocrit 25 (*)     All  other components within normal limits   ISTAT PROCEDURE - Abnormal; Notable for the following components:    POC PH 7.265 (*)     POC PCO2 65.7 (*)     POC PO2 42 (*)     POC HCO3 29.8 (*)     POC SATURATED O2 68 (*)     POC Potassium 5.7 (*)     POC TCO2 32 (*)     POC Hematocrit 28 (*)     All other components within normal limits   POCT GLUCOSE - Abnormal; Notable for the following components:    POCT Glucose 207 (*)     All other components within normal limits   CULTURE, BLOOD   CULTURE, BLOOD   DRUG SCREEN PANEL, URINE EMERGENCY    Narrative:     Preferred Collection Type->Urine, Clean Catch   LACTIC ACID, PLASMA   LIPASE   MAGNESIUM   PROTIME-INR   TSH   TROPONIN I   URINALYSIS, REFLEX TO URINE CULTURE    Narrative:     Preferred Collection Type->Urine, Clean Catch   AMMONIA   POCT GLUCOSE MONITORING CONTINUOUS   POCT GLUCOSE MONITORING CONTINUOUS            X-Rays:   Independently Interpreted Readings:   Other Readings:  Reviewed by myself, read by radiology.     Imaging Results          US Hemodialysis Access (In process)                CT Head Without Contrast (Final result)  Result time 02/24/20 00:17:27    Final result by Stephanie Mercado MD (02/24/20 00:17:27)                 Impression:      No acute intracranial abnormality.      Electronically signed by: Stephanie Mercado  Date:    02/24/2020  Time:    00:17             Narrative:    EXAMINATION:  CT OF THE HEAD WITHOUT    CLINICAL HISTORY:  Confusion/delirium, altered LOC, unexplained;    TECHNIQUE:  5 mm unenhanced axial images were obtained from the skull base to the vertex.    COMPARISON:  02/09/2016    FINDINGS:  Examination is limited by motion artifact.  There is mild chronic small vessel ischemic changes and mild cerebral atrophy.  There is no acute intracranial hemorrhage, territorial infarct or mass effect, or midline shift. In the visualized paranasal sinuses, there is a small left sphenoid sinus mucous retention cyst polyp.  There are  degenerative changes of the visualized cervical spine.                               X-Ray Chest AP Portable (Final result)  Result time 02/24/20 00:37:30    Final result by Patrick Norwood MD (02/24/20 00:37:30)                 Impression:      Persistent opacity at the left lung base likely relating to pleural fluid and atelectasis, the possibly of superimposed infiltrate is a consideration as well.  The overall appearance is thought stable without a large degree of interval change.      Electronically signed by: Patrick Norwood  Date:    02/24/2020  Time:    00:37             Narrative:    EXAMINATION:  XR CHEST AP PORTABLE    CLINICAL HISTORY:  altered mental status;    TECHNIQUE:  Single frontal view of the chest was performed.    COMPARISON:  February 18, 2020    FINDINGS:  Single portable chest view is submitted.  The cardiomediastinal silhouette appears stable.  There is continued opacity at the left lung base most consistent with pleural fluid and atelectatic change, mild superimposed infiltrate is possible.  The appearance is similar to the prior examination without a large degree of interval change.  The right hemithorax appears stable.  There is no pneumothorax.  The osseous structures demonstrate chronic change.                              Medical Decision Making:   History:   Old Medical Records: I decided to obtain old medical records.  Initial Assessment:   This is a 73 y/o female with PMHx of COPD, ulcer, chronic pain, lumbar spondylosis, vitamin D deficiency dx, anemia, migraines, chronic LBP, primary osteoarthritis of both knees, subdeltoid bursitis L>R, melanoma, osteoporosis, bilateral renal cysts, HTN, metabolic bone dx, PE with infarction, CKD stage IV, blood transfusion, seizures, and acute CHF, who presents in AMS state for the past 2 hours.   Differential Diagnosis:   Sepsis, stroke, head trauma, MI, meningitis, DKA, shock, hepatic encephalopathy,  electrolytes, hypoxia, CO poisoning,  nutritional (thiamin (EtOH), B6 (on TB tx), B12, folate, niacin), CO poisoning, drugs, EtOH,  hypothermia, dementia/delirium    Independently Interpreted Test(s):   I have ordered and independently interpreted EKG Reading(s) - see prior notes  Clinical Tests:   Lab Tests: Reviewed and Ordered  Radiological Study: Reviewed and Ordered  Medical Tests: Reviewed and Ordered  ED Management:  Patient given narcan twice in ED with response.    Potassium elevated. Treated, patient usually hypokalemic.  Hypotension resolved with IVFs and narcan.  Patient tachypneic, but refusing BiPAP.  Nephrology recs IV lasix.  Admitted to ICU                     ED Course as of Feb 24 0218   Sun Feb 23, 2020 2319 BP(!): 63/29 [LD]   2319 BP(!): 56/30 [LD]   2319 BP(!): 90/51 [LD]   2319 Temp: 98.2 °F (36.8 °C) [LD]   2319 Pulse: 79 [LD]   2319 Resp(!): 46 [LD]   2319 SpO2: 100 % [LD]   2319 EKG with NSR, rate of 67 bpm.  Low voltage EKG, no STEMI    [LD]   Mon Feb 24, 2020   0013 POC PO2(!!): 42 [LD]   0013 POC PCO2(!!): 65.7 [LD]   0013 POC PH(!!): 7.265 [LD]   0055 Patient refusing BiPAP    [LD]   0101 Benzodiazepines: Presumptive Positive [LD]   0102 Opiate Scrn, Ur: Presumptive Positive [LD]   0111 Discussed case with Dr Diggs who recommended giving lasix    [LD]   0112 Patient responded again to narcan.  BP improved.     [LD]   0119 Discussed with [facility] medicine who accepts patient for ICU admission    [LD]      ED Course User Index  [LD] Rebecca Rousseau MD                Clinical Impression:       ICD-10-CM ICD-9-CM   1. Hyperkalemia E87.5 276.7   2. AMS (altered mental status) R41.82 780.97   3. Chronic respiratory failure with hypoxia, on home O2 therapy J96.11 518.83    Z99.81 799.02     V46.2   4. Chronic neck pain M54.2 723.1    G89.29 338.29   5. Centrilobular emphysema J43.2 492.8   6. ESRD (end stage renal disease) on dialysis N18.6 585.6    Z99.2 V45.11   7. Acute on chronic respiratory failure with hypoxia J96.21  518.84     799.02   8. Acute encephalopathy G93.40 348.30   9. YOSVANY (acute kidney injury) N17.9 584.9   10. Opioid overdose, accidental or unintentional, initial encounter T40.2X1A 965.00     E850.2         Disposition:   Disposition: Admitted  Condition: Critical            I, Rebecca Rousseau,  personally performed the services described in this documentation. All medical record entries made by the scribe were at my direction and in my presence.  I have reviewed the chart and agree that the record reflects my personal performance and is accurate and complete. Rebecca Rousseau M.D. 2:17 AM02/24/2020             Rebecca Rousseau MD  02/24/20 0217       Rebecca Rousseau MD  02/24/20 0218

## 2020-02-25 ENCOUNTER — ANESTHESIA (OUTPATIENT)
Dept: INTENSIVE CARE | Facility: HOSPITAL | Age: 75
DRG: 252 | End: 2020-02-25
Payer: MEDICARE

## 2020-02-25 ENCOUNTER — ANESTHESIA EVENT (OUTPATIENT)
Dept: INTENSIVE CARE | Facility: HOSPITAL | Age: 75
DRG: 252 | End: 2020-02-25
Payer: MEDICARE

## 2020-02-25 LAB
ANION GAP SERPL CALC-SCNC: 12 MMOL/L (ref 8–16)
BUN SERPL-MCNC: 23 MG/DL (ref 8–23)
CALCIUM SERPL-MCNC: 10.7 MG/DL (ref 8.7–10.5)
CHLORIDE SERPL-SCNC: 96 MMOL/L (ref 95–110)
CO2 SERPL-SCNC: 30 MMOL/L (ref 23–29)
CREAT SERPL-MCNC: 1.9 MG/DL (ref 0.5–1.4)
EST. GFR  (AFRICAN AMERICAN): 29 ML/MIN/1.73 M^2
EST. GFR  (NON AFRICAN AMERICAN): 25 ML/MIN/1.73 M^2
GLUCOSE SERPL-MCNC: 118 MG/DL (ref 70–110)
MAGNESIUM SERPL-MCNC: 1.6 MG/DL (ref 1.6–2.6)
PHOSPHATE SERPL-MCNC: 3.4 MG/DL (ref 2.7–4.5)
POTASSIUM SERPL-SCNC: 4 MMOL/L (ref 3.5–5.1)
SODIUM SERPL-SCNC: 138 MMOL/L (ref 136–145)

## 2020-02-25 PROCEDURE — 99900035 HC TECH TIME PER 15 MIN (STAT): Mod: HCNC

## 2020-02-25 PROCEDURE — 83735 ASSAY OF MAGNESIUM: CPT | Mod: HCNC

## 2020-02-25 PROCEDURE — 63600175 PHARM REV CODE 636 W HCPCS: Mod: HCNC | Performed by: NURSE PRACTITIONER

## 2020-02-25 PROCEDURE — 25000003 PHARM REV CODE 250: Mod: HCNC | Performed by: NURSE PRACTITIONER

## 2020-02-25 PROCEDURE — 36000 PLACE NEEDLE IN VEIN: CPT | Mod: HCNC | Performed by: STUDENT IN AN ORGANIZED HEALTH CARE EDUCATION/TRAINING PROGRAM

## 2020-02-25 PROCEDURE — 84100 ASSAY OF PHOSPHORUS: CPT | Mod: HCNC

## 2020-02-25 PROCEDURE — 20000000 HC ICU ROOM: Mod: HCNC

## 2020-02-25 PROCEDURE — 27100092 HC HIGH FLOW DELIVERY CANNULA: Mod: HCNC

## 2020-02-25 PROCEDURE — 27100171 HC OXYGEN HIGH FLOW UP TO 24 HOURS: Mod: HCNC

## 2020-02-25 PROCEDURE — 94761 N-INVAS EAR/PLS OXIMETRY MLT: CPT | Mod: HCNC

## 2020-02-25 PROCEDURE — 36415 COLL VENOUS BLD VENIPUNCTURE: CPT | Mod: HCNC

## 2020-02-25 PROCEDURE — 80048 BASIC METABOLIC PNL TOTAL CA: CPT | Mod: HCNC

## 2020-02-25 PROCEDURE — 63600175 PHARM REV CODE 636 W HCPCS: Mod: HCNC | Performed by: INTERNAL MEDICINE

## 2020-02-25 RX ORDER — PARICALCITOL 5 UG/ML
1 INJECTION, SOLUTION INTRAVENOUS
Status: DISCONTINUED | OUTPATIENT
Start: 2020-02-27 | End: 2020-02-26

## 2020-02-25 RX ORDER — ALBUMIN HUMAN 50 G/1000ML
25 SOLUTION INTRAVENOUS ONCE
Status: COMPLETED | OUTPATIENT
Start: 2020-02-26 | End: 2020-02-25

## 2020-02-25 RX ORDER — MAGNESIUM SULFATE HEPTAHYDRATE 40 MG/ML
2 INJECTION, SOLUTION INTRAVENOUS ONCE
Status: COMPLETED | OUTPATIENT
Start: 2020-02-25 | End: 2020-02-25

## 2020-02-25 RX ORDER — NALOXONE HCL 0.4 MG/ML
0.2 VIAL (ML) INJECTION
Status: DISCONTINUED | OUTPATIENT
Start: 2020-02-25 | End: 2020-02-29 | Stop reason: HOSPADM

## 2020-02-25 RX ORDER — PARICALCITOL 5 UG/ML
8 INJECTION, SOLUTION INTRAVENOUS
Status: DISCONTINUED | OUTPATIENT
Start: 2020-02-25 | End: 2020-02-25

## 2020-02-25 RX ORDER — FUROSEMIDE 10 MG/ML
120 INJECTION INTRAMUSCULAR; INTRAVENOUS ONCE
Status: COMPLETED | OUTPATIENT
Start: 2020-02-25 | End: 2020-02-25

## 2020-02-25 RX ADMIN — MAGNESIUM SULFATE IN WATER 2 G: 40 INJECTION, SOLUTION INTRAVENOUS at 11:02

## 2020-02-25 RX ADMIN — PARICALCITOL 8 MCG: 5 INJECTION, SOLUTION INTRAVENOUS at 09:02

## 2020-02-25 RX ADMIN — ONDANSETRON 4 MG: 2 INJECTION INTRAMUSCULAR; INTRAVENOUS at 03:02

## 2020-02-25 RX ADMIN — NALOXONE HYDROCHLORIDE 0.25 MG/HR: 1 INJECTION PARENTERAL at 06:02

## 2020-02-25 RX ADMIN — ALBUMIN (HUMAN) 25 G: 12.5 SOLUTION INTRAVENOUS at 11:02

## 2020-02-25 RX ADMIN — CALCIUM GLUCONATE 1000 MG: 98 INJECTION, SOLUTION INTRAVENOUS at 09:02

## 2020-02-25 RX ADMIN — FUROSEMIDE 120 MG: 10 INJECTION, SOLUTION INTRAMUSCULAR; INTRAVENOUS at 09:02

## 2020-02-25 RX ADMIN — ACETAMINOPHEN 650 MG: 325 TABLET ORAL at 08:02

## 2020-02-25 NOTE — PROGRESS NOTES
[facility] Hospital Medicine  Progress Note    Patient Name: Katie Quevedo  MRN: [id number]  Patient Class: IP- Inpatient   Admission Date: 2/23/2020  Length of Stay: 1 days  Attending Physician: Jennifer Bowles*  Primary Care Provider: Kingston Verduzco MD        Subjective:     Principal Problem:Opioid overdose        HPI:  Katie Quevedo is a 75 y/o female with PMHx of COPD on 2 liters home oxygen at baseline, chronic pain, lumbar spondylosis, vitamin D deficiency dx, anemia, migraines, osteoarthritis, HTN,  PE with infarction, CKD stage IV, seizures, lumbar spondylosis, pulmonary embolism and acute CHF, who presents 2 hours history of AMS. Her  notes that she had eaten dinner well without any problems and later became unresponsive. Patient recently started on dialysis and has not dialyzed in 1 week since her dialysis access has not been functioning.  Patient was scheduled to have her access de-clotted 2/24 AM.  SBP 60-70s in ED. Requiring  narcan gtt. Taking norco 10 at home     Overview/Hospital Course:  No notes on file    Interval History: awake and alert, family by bedside, complained of frequency and want to hold lasix. Patient tried purewick in the past.  k is improved.   Possible step down later today.     Review of Systems   Respiratory: Negative for cough, chest tightness and shortness of breath.    Genitourinary: Negative for frequency and hematuria.   Musculoskeletal: Negative for back pain.   Neurological: Positive for weakness. Negative for light-headedness.   Psychiatric/Behavioral: Negative for confusion.     Objective:     Vital Signs (Most Recent):  Temp: 98.3 °F (36.8 °C) (02/25/20 0800)  Pulse: 106 (02/25/20 1300)  Resp: (!) 31 (02/25/20 1300)  BP: (!) 101/56 (02/25/20 1300)  SpO2: (!) 91 % (02/25/20 1300) Vital Signs (24h Range):  Temp:  [97.7 °F (36.5 °C)-98.4 °F (36.9 °C)] 98.3 °F (36.8 °C)  Pulse:  [] 106  Resp:  [18-52] 31  SpO2:  [90 %-100 %] 91 %  BP:  (101-167)/() 101/56     Weight: 58.9 kg (129 lb 13.6 oz)  Body mass index is 21.61 kg/m².    Intake/Output Summary (Last 24 hours) at 2/25/2020 1313  Last data filed at 2/25/2020 1200  Gross per 24 hour   Intake 870.62 ml   Output 3580 ml   Net -2709.38 ml      Physical Exam   Constitutional: She is oriented to person, place, and time. She appears well-developed and well-nourished.   HENT:   Head: Normocephalic and atraumatic.   Neck: Neck supple.   Cardiovascular: Normal rate, regular rhythm, normal heart sounds and intact distal pulses.   Pulmonary/Chest: Tachypnea noted. No respiratory distress. She has no wheezes. She has no rales.   Abdominal: Soft. Bowel sounds are normal. She exhibits no distension.   Musculoskeletal: Normal range of motion. She exhibits no edema or tenderness.   Neurological: She is alert and oriented to person, place, and time.   Skin: Skin is warm and dry. Capillary refill takes less than 2 seconds.   Psychiatric: She has a normal mood and affect.       Significant Labs:   ABGs:   Recent Labs   Lab 02/24/20  1641   PH 7.435   PCO2 38.7   HCO3 26.0   POCSATURATED 93*   BE 2     Blood Culture:   Recent Labs   Lab 02/23/20 2312 02/23/20  2319   LABBLOO No Growth to date  No Growth to date No Growth to date  No Growth to date     CBC:   Recent Labs   Lab 02/23/20 2312 02/23/20  2327 02/24/20 0357 02/24/20  1641   WBC 6.67  --   --  7.86  --    HGB 7.9*  --   --  9.0*  --    HCT 27.1*   < > 28* 31.8* 32*   *  --   --  156  --     < > = values in this interval not displayed.     CMP:   Recent Labs   Lab 02/23/20 2312 02/24/20  0357 02/24/20  0818 02/25/20  1047    140 134* 138   K 6.2* 5.3* 6.2* 4.0   CL 97 99 97 96   CO2 32* 29 28 30*   GLU 86 68* 148* 118*   BUN 46* 46* 45* 23   CREATININE 3.8* 3.6* 3.4* 1.9*   CALCIUM 9.3 9.2 9.1 10.7*   PROT 6.7  --   --   --    ALBUMIN 3.0*  --   --   --    BILITOT 0.3  --   --   --    ALKPHOS 107  --   --   --    AST 22  --   --    --    ALT 9*  --   --   --    ANIONGAP 8 12 9 12   EGFRNONAA 11* 12* 12* 25*     Cardiac Markers:   Recent Labs   Lab 02/23/20  2312   *     Troponin:   Recent Labs   Lab 02/23/20 2312   TROPONINI 0.015     TSH:   Recent Labs   Lab 02/23/20  2312   TSH 2.189     Urine Culture: No results for input(s): LABURIN in the last 48 hours.  Recent Lab Results       02/25/20  1047   02/25/20  0417   02/24/20  1641   02/24/20  1641        Allens Test     Pass       Anion Gap 12           Site     RB       BUN, Bld 23           Calcium 10.7           Chloride 96           CO2 30           Creatinine 1.9           DelSys     Nasal Can       eGFR if  29           eGFR if non  25  Comment:  Calculation used to obtain the estimated glomerular filtration  rate (eGFR) is the CKD-EPI equation.              Glucose 118           Magnesium   1.6         Phosphorus   3.4         POC BE     2       POC HCO3     26.0       POC Hematocrit     32       POC HEMOGLOBIN     11       POC Ionized Calcium     1.26       POC PCO2     38.7       POC PH     7.435       POC PO2     64       POC Potassium     4.3       POC SATURATED O2     93       POC Sodium     137       POC TCO2     27       POCT Glucose       104     Potassium 4.0           Sample     ARTERIAL       Sodium 138                 Significant Imaging: I have reviewed all pertinent imaging results/findings within the past 24 hours.      Assessment/Plan:      Acute encephalopathy  Opioid overdose  Chronic neck pain  Chronic pain  ESRD (end stage renal disease) on dialysis  Hyperkalemia  Taking norco 10 at home   Requiring  narcan gtt.   Patient with non function dialysis catheter planned for de-clotting today   Consult nephrology  Avoid nephrology toxic agents  renally dose medications  Consult surgery for graft declotting today    ESRD (end stage renal disease) on dialysis          Chronic respiratory failure with hypoxia, on home O2 therapy  COPD  (chronic obstructive pulmonary disease)  Continue supplemental O2, prn nebs       Essential hypertension  Hold Norvasc  Monitor        VTE Risk Mitigation (From admission, onward)         Ordered     IP VTE HIGH RISK PATIENT  Once      02/24/20 0343     Place sequential compression device  Until discontinued      02/24/20 0343     Place DAWN hose  Until discontinued      02/24/20 0056                Critical care time spent on the evaluation and treatment of severe organ dysfunction, review of pertinent labs and imaging studies, discussions with consulting providers and discussions with patient/family: 35 minutes.      Jennifer Bowles MD  Department of Hospital Medicine   [facility]

## 2020-02-25 NOTE — PLAN OF CARE
Pt Drowsy and arousable to voice. On Narcan gtt. On vapotherm for oxygenation support. HD done today,  ml. AVG revision rescheduled for 2/25. Bed in low position.  Call light within reach.  Family at bedside. Will continue to monitor.

## 2020-02-25 NOTE — SUBJECTIVE & OBJECTIVE
Past Medical History:   Diagnosis Date    Acute congestive heart failure 2/10/2020    Anemia     Bilateral renal cysts     Cataract     Chronic LBP 7/26/2012    Chronic pain     CKD (chronic kidney disease), stage IV     COPD (chronic obstructive pulmonary disease)     Dehydration     Encounter for blood transfusion     HTN (hypertension)     Lumbar spondylosis     Melanoma     of the lip    Metabolic bone disease     Migraines, neuralgic     Osteoporosis     Primary osteoarthritis of both knees     s/p Rt TKA    Pulmonary embolism with infarction     Seizures 1972    x1 only    Subdeltoid bursitis, L>R. 9/27/2012    Ulcer     Vitamin D deficiency disease        Past Surgical History:   Procedure Laterality Date    BLADDER SUSPENSION      CATARACT EXTRACTION  11/18/13    left eye    CERVICAL LAMINECTOMY      x3, fusion x1    COLONOSCOPY  2009    HYSTERECTOMY      JOINT REPLACEMENT  2001    total right knee     LUMBAR LAMINECTOMY      x 3, fusion x1    OOPHORECTOMY      PLACEMENT OF ARTERIOVENOUS GRAFT Left 1/21/2020    Procedure: INSERTION, GRAFT, ARTERIOVENOUS;  Surgeon: Lindsey Louie MD;  Location: [facility] OR;  Service: General;  Laterality: Left;    THROMBECTOMY Left 2/3/2020    Procedure: THROMBECTOMY;  Surgeon: Lindsey Louie MD;  Location: [facility] OR;  Service: General;  Laterality: Left;       Review of patient's allergies indicates:   Allergen Reactions    Aspirin      Other reaction(s): hx of ulcers    Tetracycline Swelling     Other reaction(s): Swelling    Penicillins Rash     Other reaction(s): Hives  Other reaction(s): Rash  Other reaction(s): Rash  Other reaction(s): Hives       No current facility-administered medications on file prior to encounter.      Current Outpatient Medications on File Prior to Encounter   Medication Sig    albuterol-ipratropium (DUO-NEB) 2.5 mg-0.5 mg/3 mL nebulizer solution Take 3 mLs by nebulization every 6 (six) hours as needed for  Shortness of Breath. Rescue    allopurinoL (ZYLOPRIM) 100 MG tablet Take 1 tablet (100 mg total) by mouth on Tuesday, Thursday, Saturday, and Sunday.    amLODIPine (NORVASC) 2.5 MG tablet Take 2.5 mg by mouth once daily.     busPIRone (BUSPAR) 15 MG tablet Take 1 tablet (15 mg total) by mouth 2 (two) times daily.    clonazePAM (KLONOPIN) 1 MG tablet Take 1 tablet (1 mg total) by mouth 2 (two) times daily as needed for Anxiety.    cyproheptadine (PERIACTIN) 4 mg tablet Take 1 tablet (4 mg total) by mouth every 8 (eight) hours.    diphenhydrAMINE (BENADRYL) 25 mg capsule Take 25 mg by mouth every 6 (six) hours as needed for Itching.    ergocalciferol (ERGOCALCIFEROL) 50,000 unit Cap Take 1 capsule by mouth once weekly for 10 weeks, then take 1 capsule by mouth once monthly.    fluticasone-umeclidin-vilanter (TRELEGY ELLIPTA) 100-62.5-25 mcg DsDv Inhale 1 puff by mouth into the lungs once daily.    furosemide (LASIX) 80 MG tablet Take 1 tablet (80 mg total) by mouth twice daily on non dialysis days Tuesday, Thursday, Saturday, and Sunday.    furosemide (LASIX) 80 MG tablet Take 1 tablet (80 mg total) by mouth 2 (two) times daily.    HYDROcodone-acetaminophen (NORCO)  mg per tablet Take 1 tablet by mouth 3 (three) times daily as needed.    levoFLOXacin (LEVAQUIN) 750 MG tablet Take 1 tablet (750 mg total) by mouth once daily.    lidocaine-prilocaine (EMLA) cream Apply topically to left arm prior to dialysis.    mupirocin (BACTROBAN) 2 % ointment apply by Nasal route 2 (two) times daily.    ondansetron (ZOFRAN-ODT) 4 MG TbDL Dissolve one tablet under the tongue every 6 (six) hours as needed.    oseltamivir (TAMIFLU) 30 MG capsule Take 1 capsule (30 mg total) by mouth once daily. for 4 days    potassium chloride SA (K-DUR,KLOR-CON) 20 MEQ tablet Take 2 tablets (40 mEq total) by mouth once daily.    sodium bicarbonate 650 MG tablet Take 1 tablet (650 mg total) by mouth 2 (two) times daily.     traZODone (DESYREL) 100 MG tablet Take 100 mg by mouth nightly as needed for Insomnia.    zolpidem (AMBIEN) 5 MG Tab Take 5 mg by mouth every evening.      Family History     Problem Relation (Age of Onset)    Arthritis Mother    Cancer Father    Cataracts Father, Sister    Diabetes Maternal Aunt    Glaucoma Cousin    Heart attack Maternal Grandfather    Heart disease Maternal Grandfather    Hypertension Father, Maternal Grandfather    Stroke Mother        Tobacco Use    Smoking status: Former Smoker     Packs/day: 1.00     Types: Cigarettes    Smokeless tobacco: Former User     Quit date: 2/3/2015   Substance and Sexual Activity    Alcohol use: Not Currently     Comment: Rare    Drug use: No    Sexual activity: Never     Partners: Male     Review of Systems   Respiratory: Positive for shortness of breath. Negative for cough and chest tightness.    Genitourinary: Negative for frequency and hematuria.   Musculoskeletal: Negative for back pain.   Neurological: Positive for weakness. Negative for light-headedness.   Psychiatric/Behavioral: Negative for confusion.     Objective:     Vital Signs (Most Recent):  Temp: 97.7 °F (36.5 °C) (02/24/20 1515)  Pulse: 88 (02/24/20 1918)  Resp: (!) 34 (02/24/20 1918)  BP: (!) 119/57 (02/24/20 1918)  SpO2: 99 % (02/24/20 1918) Vital Signs (24h Range):  Temp:  [97.6 °F (36.4 °C)-98.2 °F (36.8 °C)] 97.7 °F (36.5 °C)  Pulse:  [] 88  Resp:  [7-48] 34  SpO2:  [93 %-100 %] 99 %  BP: ()/(29-84) 119/57     Weight: 58.9 kg (129 lb 13.6 oz)  Body mass index is 21.61 kg/m².    Physical Exam   Constitutional: She is oriented to person, place, and time. She appears well-developed and well-nourished.   HENT:   Head: Normocephalic and atraumatic.   Eyes: Pupils are equal, round, and reactive to light.   Neck: Normal range of motion. Neck supple. No JVD present.   Cardiovascular: Normal rate, regular rhythm, normal heart sounds and intact distal pulses.   Pulmonary/Chest:  Tachypnea noted. No respiratory distress. She has wheezes. She has rales.   Breath sounds diminished throughout    Abdominal: Soft. Bowel sounds are normal. She exhibits no distension.   Musculoskeletal: Normal range of motion. She exhibits no edema or tenderness.   Neurological: She is alert and oriented to person, place, and time.   Skin: Skin is warm and dry. Capillary refill takes less than 2 seconds.   Psychiatric: She has a normal mood and affect. Her behavior is normal.         CRANIAL NERVES     CN III, IV, VI   Pupils are equal, round, and reactive to light.       Significant Labs:   A1C: No results for input(s): HGBA1C in the last 4320 hours.  ABGs:   Recent Labs   Lab 02/24/20  1641   PH 7.435   PCO2 38.7   HCO3 26.0   POCSATURATED 93*   BE 2     Blood Culture:   Recent Labs   Lab 02/23/20 2312 02/23/20 2319   LABBLOO No Growth to date No Growth to date     CBC:   Recent Labs   Lab 02/23/20 2312 02/23/20 2327 02/24/20 0357 02/24/20  1641   WBC 6.67  --   --  7.86  --    HGB 7.9*  --   --  9.0*  --    HCT 27.1*   < > 28* 31.8* 32*   *  --   --  156  --     < > = values in this interval not displayed.     CMP:   Recent Labs   Lab 02/23/20 2312 02/24/20 0357 02/24/20  0818    140 134*   K 6.2* 5.3* 6.2*   CL 97 99 97   CO2 32* 29 28   GLU 86 68* 148*   BUN 46* 46* 45*   CREATININE 3.8* 3.6* 3.4*   CALCIUM 9.3 9.2 9.1   PROT 6.7  --   --    ALBUMIN 3.0*  --   --    BILITOT 0.3  --   --    ALKPHOS 107  --   --    AST 22  --   --    ALT 9*  --   --    ANIONGAP 8 12 9   EGFRNONAA 11* 12* 12*     Cardiac Markers:   Recent Labs   Lab 02/23/20 2312   *     Coagulation:   Recent Labs   Lab 02/23/20 2312   INR 1.1     Lactic Acid:   Recent Labs   Lab 02/23/20 2312   LACTATE 2.2     Prealbumin: No results for input(s): PREALBUMIN in the last 48 hours.  TSH:   Recent Labs   Lab 02/23/20  2312   TSH 2.189     Urine Culture: No results for input(s): LABURIN in the last 48 hours.  Urine  Studies:   Recent Labs   Lab 02/24/20  0038   COLORU Yellow   APPEARANCEUA Clear   PHUR 6.0   SPECGRAV 1.015   PROTEINUA Negative   GLUCUA Negative   KETONESU Negative   BILIRUBINUA Negative   OCCULTUA Negative   NITRITE Negative   UROBILINOGEN Negative   LEUKOCYTESUR Negative       Significant Imaging: I have reviewed all pertinent imaging results/findings within the past 24 hours.

## 2020-02-25 NOTE — PLAN OF CARE
Pt oriented x 4. Easily arouses to voice while sleeping. Denies pain. Narcan gtt stopped this morning. On vapotherm for respiratory support. Call light within reach. Bed in low position. Family at bedside. Will continue to monitor.

## 2020-02-25 NOTE — H&P
[facility] Hospital Medicine  History & Physical    Patient Name: Katie Quevedo  MRN: [id number]  Admission Date: 2/23/2020  Attending Physician: Jennifer Bowles MD  Primary Care Provider: Kingston Verduzco MD         Patient information was obtained from relative(s) and ER records.     Subjective:     Principal Problem:Opioid overdose    Chief Complaint:   Chief Complaint   Patient presents with    Altered Mental Status     Patient presents to ED secondary to altered mental status.  at bedside states patient after eating dinner 2-3 hours ago patient became unresponsive. Has been off of eliquis x7 days and has not received dialysis in one week. Patient arrived on 15L non rebreather.         HPI: Katie Quevedo is a 75 y/o female with PMHx of COPD on 2 liters home oxygen at baseline, chronic pain, lumbar spondylosis, vitamin D deficiency dx, anemia, migraines, osteoarthritis, HTN,  PE with infarction, CKD stage IV, seizures, lumbar spondylosis, pulmonary embolism and acute CHF, who presents 2 hours history of AMS. Her  notes that she had eaten dinner well without any problems and later became unresponsive. Patient recently started on dialysis and has not dialyzed in 1 week since her dialysis access has not been functioning.  Patient was scheduled to have her access de-clotted 2/24 AM.  SBP 60-70s in ED. Requiring  narcan gtt. Taking norco 10 at home     Past Medical History:   Diagnosis Date    Acute congestive heart failure 2/10/2020    Anemia     Bilateral renal cysts     Cataract     Chronic LBP 7/26/2012    Chronic pain     CKD (chronic kidney disease), stage IV     COPD (chronic obstructive pulmonary disease)     Dehydration     Encounter for blood transfusion     HTN (hypertension)     Lumbar spondylosis     Melanoma     of the lip    Metabolic bone disease     Migraines, neuralgic     Osteoporosis     Primary osteoarthritis of both knees     s/p Rt TKA     Pulmonary embolism with infarction     Seizures 1972    x1 only    Subdeltoid bursitis, L>R. 9/27/2012    Ulcer     Vitamin D deficiency disease        Past Surgical History:   Procedure Laterality Date    BLADDER SUSPENSION      CATARACT EXTRACTION  11/18/13    left eye    CERVICAL LAMINECTOMY      x3, fusion x1    COLONOSCOPY  2009    HYSTERECTOMY      JOINT REPLACEMENT  2001    total right knee     LUMBAR LAMINECTOMY      x 3, fusion x1    OOPHORECTOMY      PLACEMENT OF ARTERIOVENOUS GRAFT Left 1/21/2020    Procedure: INSERTION, GRAFT, ARTERIOVENOUS;  Surgeon: Lindsey Louie MD;  Location: [facility] OR;  Service: General;  Laterality: Left;    THROMBECTOMY Left 2/3/2020    Procedure: THROMBECTOMY;  Surgeon: Lindsey Louie MD;  Location: [facility] OR;  Service: General;  Laterality: Left;       Review of patient's allergies indicates:   Allergen Reactions    Aspirin      Other reaction(s): hx of ulcers    Tetracycline Swelling     Other reaction(s): Swelling    Penicillins Rash     Other reaction(s): Hives  Other reaction(s): Rash  Other reaction(s): Rash  Other reaction(s): Hives       No current facility-administered medications on file prior to encounter.      Current Outpatient Medications on File Prior to Encounter   Medication Sig    albuterol-ipratropium (DUO-NEB) 2.5 mg-0.5 mg/3 mL nebulizer solution Take 3 mLs by nebulization every 6 (six) hours as needed for Shortness of Breath. Rescue    allopurinoL (ZYLOPRIM) 100 MG tablet Take 1 tablet (100 mg total) by mouth on Tuesday, Thursday, Saturday, and Sunday.    amLODIPine (NORVASC) 2.5 MG tablet Take 2.5 mg by mouth once daily.     busPIRone (BUSPAR) 15 MG tablet Take 1 tablet (15 mg total) by mouth 2 (two) times daily.    clonazePAM (KLONOPIN) 1 MG tablet Take 1 tablet (1 mg total) by mouth 2 (two) times daily as needed for Anxiety.    cyproheptadine (PERIACTIN) 4 mg tablet Take 1 tablet (4 mg total) by mouth every 8 (eight) hours.     diphenhydrAMINE (BENADRYL) 25 mg capsule Take 25 mg by mouth every 6 (six) hours as needed for Itching.    ergocalciferol (ERGOCALCIFEROL) 50,000 unit Cap Take 1 capsule by mouth once weekly for 10 weeks, then take 1 capsule by mouth once monthly.    fluticasone-umeclidin-vilanter (TRELEGY ELLIPTA) 100-62.5-25 mcg DsDv Inhale 1 puff by mouth into the lungs once daily.    furosemide (LASIX) 80 MG tablet Take 1 tablet (80 mg total) by mouth twice daily on non dialysis days Tuesday, Thursday, Saturday, and Sunday.    furosemide (LASIX) 80 MG tablet Take 1 tablet (80 mg total) by mouth 2 (two) times daily.    HYDROcodone-acetaminophen (NORCO)  mg per tablet Take 1 tablet by mouth 3 (three) times daily as needed.    levoFLOXacin (LEVAQUIN) 750 MG tablet Take 1 tablet (750 mg total) by mouth once daily.    lidocaine-prilocaine (EMLA) cream Apply topically to left arm prior to dialysis.    mupirocin (BACTROBAN) 2 % ointment apply by Nasal route 2 (two) times daily.    ondansetron (ZOFRAN-ODT) 4 MG TbDL Dissolve one tablet under the tongue every 6 (six) hours as needed.    oseltamivir (TAMIFLU) 30 MG capsule Take 1 capsule (30 mg total) by mouth once daily. for 4 days    potassium chloride SA (K-DUR,KLOR-CON) 20 MEQ tablet Take 2 tablets (40 mEq total) by mouth once daily.    sodium bicarbonate 650 MG tablet Take 1 tablet (650 mg total) by mouth 2 (two) times daily.    traZODone (DESYREL) 100 MG tablet Take 100 mg by mouth nightly as needed for Insomnia.    zolpidem (AMBIEN) 5 MG Tab Take 5 mg by mouth every evening.      Family History     Problem Relation (Age of Onset)    Arthritis Mother    Cancer Father    Cataracts Father, Sister    Diabetes Maternal Aunt    Glaucoma Cousin    Heart attack Maternal Grandfather    Heart disease Maternal Grandfather    Hypertension Father, Maternal Grandfather    Stroke Mother        Tobacco Use    Smoking status: Former Smoker     Packs/day: 1.00     Types:  Cigarettes    Smokeless tobacco: Former User     Quit date: 2/3/2015   Substance and Sexual Activity    Alcohol use: Not Currently     Comment: Rare    Drug use: No    Sexual activity: Never     Partners: Male     Review of Systems   Respiratory: Positive for shortness of breath. Negative for cough and chest tightness.    Genitourinary: Negative for frequency and hematuria.   Musculoskeletal: Negative for back pain.   Neurological: Positive for weakness. Negative for light-headedness.   Psychiatric/Behavioral: Negative for confusion.     Objective:     Vital Signs (Most Recent):  Temp: 97.7 °F (36.5 °C) (02/24/20 1515)  Pulse: 88 (02/24/20 1918)  Resp: (!) 34 (02/24/20 1918)  BP: (!) 119/57 (02/24/20 1918)  SpO2: 99 % (02/24/20 1918) Vital Signs (24h Range):  Temp:  [97.6 °F (36.4 °C)-98.2 °F (36.8 °C)] 97.7 °F (36.5 °C)  Pulse:  [] 88  Resp:  [7-48] 34  SpO2:  [93 %-100 %] 99 %  BP: ()/(29-84) 119/57     Weight: 58.9 kg (129 lb 13.6 oz)  Body mass index is 21.61 kg/m².    Physical Exam   Constitutional: She is oriented to person, place, and time. She appears well-developed and well-nourished.   HENT:   Head: Normocephalic and atraumatic.   Eyes: Pupils are equal, round, and reactive to light.   Neck: Normal range of motion. Neck supple. No JVD present.   Cardiovascular: Normal rate, regular rhythm, normal heart sounds and intact distal pulses.   Pulmonary/Chest: Tachypnea noted. No respiratory distress. She has wheezes. She has rales.   Breath sounds diminished throughout    Abdominal: Soft. Bowel sounds are normal. She exhibits no distension.   Musculoskeletal: Normal range of motion. She exhibits no edema or tenderness.   Neurological: She is alert and oriented to person, place, and time.   Skin: Skin is warm and dry. Capillary refill takes less than 2 seconds.   Psychiatric: She has a normal mood and affect. Her behavior is normal.         CRANIAL NERVES     CN III, IV, VI   Pupils are equal,  round, and reactive to light.       Significant Labs:   A1C: No results for input(s): HGBA1C in the last 4320 hours.  ABGs:   Recent Labs   Lab 02/24/20  1641   PH 7.435   PCO2 38.7   HCO3 26.0   POCSATURATED 93*   BE 2     Blood Culture:   Recent Labs   Lab 02/23/20 2312 02/23/20 2319   LABBLOO No Growth to date No Growth to date     CBC:   Recent Labs   Lab 02/23/20 2312 02/23/20 2327 02/24/20 0357 02/24/20  1641   WBC 6.67  --   --  7.86  --    HGB 7.9*  --   --  9.0*  --    HCT 27.1*   < > 28* 31.8* 32*   *  --   --  156  --     < > = values in this interval not displayed.     CMP:   Recent Labs   Lab 02/23/20 2312 02/24/20 0357 02/24/20  0818    140 134*   K 6.2* 5.3* 6.2*   CL 97 99 97   CO2 32* 29 28   GLU 86 68* 148*   BUN 46* 46* 45*   CREATININE 3.8* 3.6* 3.4*   CALCIUM 9.3 9.2 9.1   PROT 6.7  --   --    ALBUMIN 3.0*  --   --    BILITOT 0.3  --   --    ALKPHOS 107  --   --    AST 22  --   --    ALT 9*  --   --    ANIONGAP 8 12 9   EGFRNONAA 11* 12* 12*     Cardiac Markers:   Recent Labs   Lab 02/23/20 2312   *     Coagulation:   Recent Labs   Lab 02/23/20 2312   INR 1.1     Lactic Acid:   Recent Labs   Lab 02/23/20 2312   LACTATE 2.2     Prealbumin: No results for input(s): PREALBUMIN in the last 48 hours.  TSH:   Recent Labs   Lab 02/23/20 2312   TSH 2.189     Urine Culture: No results for input(s): LABURIN in the last 48 hours.  Urine Studies:   Recent Labs   Lab 02/24/20  0038   COLORU Yellow   APPEARANCEUA Clear   PHUR 6.0   SPECGRAV 1.015   PROTEINUA Negative   GLUCUA Negative   KETONESU Negative   BILIRUBINUA Negative   OCCULTUA Negative   NITRITE Negative   UROBILINOGEN Negative   LEUKOCYTESUR Negative       Significant Imaging: I have reviewed all pertinent imaging results/findings within the past 24 hours.    Assessment/Plan:     Acute encephalopathy  Opioid overdose  Chronic neck pain  Chronic pain  ESRD (end stage renal disease) on dialysis  Hyperkalemia  Taking  norco 10 at home   Requiring  narcan gtt.   Patient with non function dialysis catheter planned for de-clotting today   Consult nephrology  Avoid nephrology toxic agents  renally dose medications  Consult surgery for graft declotting today    ESRD (end stage renal disease) on dialysis          Chronic respiratory failure with hypoxia, on home O2 therapy  COPD (chronic obstructive pulmonary disease)  Continue supplemental O2, prn nebs       Essential hypertension  Hold Norvasc  Monitor        VTE Risk Mitigation (From admission, onward)         Ordered     IP VTE HIGH RISK PATIENT  Once      02/24/20 0343     Place sequential compression device  Until discontinued      02/24/20 0343     Place DAWN hose  Until discontinued      02/24/20 0056              Critical care time spent on the evaluation and treatment of severe organ dysfunction, review of pertinent labs and imaging studies, discussions with consulting providers and discussions with patient/family: 45 minutes.     Jennifer Bowles MD  Department of Hospital Medicine   [facility]

## 2020-02-25 NOTE — HPI
Katie Quevedo is a 73 y/o female with PMHx of COPD on 2 liters home oxygen at baseline, chronic pain, lumbar spondylosis, vitamin D deficiency dx, anemia, migraines, osteoarthritis, HTN,  PE with infarction, CKD stage IV, seizures, lumbar spondylosis, pulmonary embolism and acute CHF, who presents 2 hours history of AMS. Her  notes that she had eaten dinner well without any problems and later became unresponsive. Patient recently started on dialysis and has not dialyzed in 1 week since her dialysis access has not been functioning.  Patient was scheduled to have her access de-clotted 2/24 AM.  SBP 60-70s in ED. Requiring  narcan gtt. Taking norco 10 at home

## 2020-02-25 NOTE — PLAN OF CARE
Patient on oxygen with documented flow.  Will attempt to wean per O2 order protocol. Per patient family, 2 lpm O2 is worn at home. Will continue to monitor.

## 2020-02-25 NOTE — PLAN OF CARE
02/24/20 1430   Discharge Assessment   Assessment Type Discharge Planning Assessment   Confirmed/corrected address and phone number on facesheet? Yes   Assessment information obtained from? Caregiver   Prior to hospitilization cognitive status: Alert/Oriented   Prior to hospitalization functional status: Independent;Assistive Equipment   Current cognitive status: Coma/Sedated/Intubated   Current Functional Status: Partially Dependent   Lives With spouse   Able to Return to Prior Arrangements yes   Is patient able to care for self after discharge? Unable to determine at this time (comments)   Who are your caregiver(s) and their phone number(s)? Dre Quevedo()[phone]   Patient's perception of discharge disposition home or selfcare   Readmission Within the Last 30 Days   (pt was discharged from [facility] 2-21-20 but she was in OBS.)   Patient currently being followed by outpatient case management? No  (Pt is being followed by Dr. Gambino in [facility] for her CHF.)   Patient currently receives any other outside agency services? No   Equipment Currently Used at Home nebulizer;oxygen;power chair;cane, straight   Do you have any problems affording any of your prescribed medications? No   Is the patient taking medications as prescribed? yes   Transportation Anticipated family or friend will provide   Dialysis Name and Scheduled days MWF at 2:30pm at [facility](Nephrologist is Dr. Aura Diggs)   Does the patient receive services at the [facility]? No   Discharge Plan A Home with family   Discharge Plan B Home Health   DME Needed Upon Discharge    (TBD)   Patient/Family in Agreement with Plan yes     Incomplete             []Hide copied text    []Hover for details     Principal Problem:Opioid overdose     Chief Complaint:           Chief Complaint   Patient presents with    Altered Mental Status       Patient presents to ED secondary to altered mental status.  at bedside states  patient after eating dinner 2-3 hours ago patient became unresponsive. Has been off of eliquis x7 days and has not received dialysis in one week. Patient arrived on 15L non rebreather.          HPI: Katie Quevedo is a 75 y/o female with PMHx of COPD on 2 liters home oxygen at baseline, chronic pain, lumbar spondylosis, vitamin D deficiency dx, anemia, migraines, osteoarthritis, HTN,  PE with infarction, CKD stage IV, seizures, lumbar spondylosis, pulmonary embolism and acute CHF, who presents 2 hours history of AMS. Her  notes that she had eaten dinner well without any problems and later became unresponsive. Patient recently started on dialysis and has not dialyzed in 1 week since her dialysis access has not been functioning.  Patient was scheduled to have her access de-clotted 2/24 AM.  SBP 60-70s in ED. Requiring  narcan gtt. Taking norco 10 at home      The pt's currently in ICU. The Sw met with the pt and her  Dre who was at bedside. The couple is known to the Sw from [facility]. The pt lives with her  in [place]. The pt's independent with her adl's and uses dme at home. The pt started hd 2-10-20. The pt goes to [facility] at 2:30pm under the care of Dr. Aura Diggs. The pt's  will transport her home at d/c. He states the pt hasn't been to dialysis in 5 days due to a conflict with the [facility] schedule. The Sw stressed the importance of the pt receiving her hd in a timely fashion. He acknowledged understanding but states the holiday messed her schedule up. The pt has a very supportive family.      The Sw wrote her name and contact info on the white board and gave the pt's  a d/c brochure. The Sw encouraged him to call should he have any questions or concerns. The Sw will continue to follow pt throughout the transitions of care and assist with any d/c needs.

## 2020-02-25 NOTE — SUBJECTIVE & OBJECTIVE
Interval History: awake and alert, family by bedside, complained of frequency and want to hold lasix. Patient tried purewick in the past.  k is improved.   Possible step down later today.     Review of Systems   Respiratory: Negative for cough, chest tightness and shortness of breath.    Genitourinary: Negative for frequency and hematuria.   Musculoskeletal: Negative for back pain.   Neurological: Positive for weakness. Negative for light-headedness.   Psychiatric/Behavioral: Negative for confusion.     Objective:     Vital Signs (Most Recent):  Temp: 98.3 °F (36.8 °C) (02/25/20 0800)  Pulse: 106 (02/25/20 1300)  Resp: (!) 31 (02/25/20 1300)  BP: (!) 101/56 (02/25/20 1300)  SpO2: (!) 91 % (02/25/20 1300) Vital Signs (24h Range):  Temp:  [97.7 °F (36.5 °C)-98.4 °F (36.9 °C)] 98.3 °F (36.8 °C)  Pulse:  [] 106  Resp:  [18-52] 31  SpO2:  [90 %-100 %] 91 %  BP: (101-167)/() 101/56     Weight: 58.9 kg (129 lb 13.6 oz)  Body mass index is 21.61 kg/m².    Intake/Output Summary (Last 24 hours) at 2/25/2020 1313  Last data filed at 2/25/2020 1200  Gross per 24 hour   Intake 870.62 ml   Output 3580 ml   Net -2709.38 ml      Physical Exam   Constitutional: She is oriented to person, place, and time. She appears well-developed and well-nourished.   HENT:   Head: Normocephalic and atraumatic.   Neck: Neck supple.   Cardiovascular: Normal rate, regular rhythm, normal heart sounds and intact distal pulses.   Pulmonary/Chest: Tachypnea noted. No respiratory distress. She has no wheezes. She has no rales.   Abdominal: Soft. Bowel sounds are normal. She exhibits no distension.   Musculoskeletal: Normal range of motion. She exhibits no edema or tenderness.   Neurological: She is alert and oriented to person, place, and time.   Skin: Skin is warm and dry. Capillary refill takes less than 2 seconds.   Psychiatric: She has a normal mood and affect.       Significant Labs:   ABGs:   Recent Labs   Lab 02/24/20  1641   PH 7.435    PCO2 38.7   HCO3 26.0   POCSATURATED 93*   BE 2     Blood Culture:   Recent Labs   Lab 02/23/20 2312 02/23/20  2319   LABBLOO No Growth to date  No Growth to date No Growth to date  No Growth to date     CBC:   Recent Labs   Lab 02/23/20 2312 02/23/20  2327 02/24/20  0357 02/24/20  1641   WBC 6.67  --   --  7.86  --    HGB 7.9*  --   --  9.0*  --    HCT 27.1*   < > 28* 31.8* 32*   *  --   --  156  --     < > = values in this interval not displayed.     CMP:   Recent Labs   Lab 02/23/20 2312 02/24/20 0357 02/24/20  0818 02/25/20  1047    140 134* 138   K 6.2* 5.3* 6.2* 4.0   CL 97 99 97 96   CO2 32* 29 28 30*   GLU 86 68* 148* 118*   BUN 46* 46* 45* 23   CREATININE 3.8* 3.6* 3.4* 1.9*   CALCIUM 9.3 9.2 9.1 10.7*   PROT 6.7  --   --   --    ALBUMIN 3.0*  --   --   --    BILITOT 0.3  --   --   --    ALKPHOS 107  --   --   --    AST 22  --   --   --    ALT 9*  --   --   --    ANIONGAP 8 12 9 12   EGFRNONAA 11* 12* 12* 25*     Cardiac Markers:   Recent Labs   Lab 02/23/20 2312   *     Troponin:   Recent Labs   Lab 02/23/20 2312   TROPONINI 0.015     TSH:   Recent Labs   Lab 02/23/20 2312   TSH 2.189     Urine Culture: No results for input(s): LABURIN in the last 48 hours.  Recent Lab Results       02/25/20  1047   02/25/20  0417   02/24/20  1641   02/24/20  1641        Allens Test     Pass       Anion Gap 12           Site     RB       BUN, Bld 23           Calcium 10.7           Chloride 96           CO2 30           Creatinine 1.9           DelSys     Nasal Can       eGFR if  29           eGFR if non  25  Comment:  Calculation used to obtain the estimated glomerular filtration  rate (eGFR) is the CKD-EPI equation.              Glucose 118           Magnesium   1.6         Phosphorus   3.4         POC BE     2       POC HCO3     26.0       POC Hematocrit     32       POC HEMOGLOBIN     11       POC Ionized Calcium     1.26       POC PCO2     38.7       POC  PH     7.435       POC PO2     64       POC Potassium     4.3       POC SATURATED O2     93       POC Sodium     137       POC TCO2     27       POCT Glucose       104     Potassium 4.0           Sample     ARTERIAL       Sodium 138                 Significant Imaging: I have reviewed all pertinent imaging results/findings within the past 24 hours.

## 2020-02-25 NOTE — ASSESSMENT & PLAN NOTE
Opioid overdose  Chronic neck pain  Chronic pain  ESRD (end stage renal disease) on dialysis  Hyperkalemia  Taking norco 10 at home   Requiring  narcan gtt.   Patient with non function dialysis catheter planned for de-clotting today   Consult nephrology  Avoid nephrology toxic agents  renally dose medications  Consult surgery for graft declotting today

## 2020-02-25 NOTE — PROGRESS NOTES
Progress Note  Nephrology      Consult Requested By: Jennifer Bowles*      SUBJECTIVE:     Overnight events  Patient is a 74 y.o. female     Patient Active Problem List   Diagnosis    Melanoma    Chronic pain    Vitamin deficiency    Cervical post-laminectomy syndrome    Lumbar postlaminectomy syndrome    Chronic neck pain    Lumbosacral spondylosis without myelopathy    Isolated cervical dystonia    Primary osteoarthritis of both knees    Subdeltoid bursitis, L>R.    Other fragments of torsion dystonia    Nuclear sclerosis - Both Eyes    Rotator cuff tear, right    Biceps tendonitis    Osteoarthritis, hip, bilateral    Lumbar radiculopathy, BLE    Cervical radiculopathy, BUE    Osteoporosis    Chronic low back pain    Iron deficiency anemia    Essential hypertension    Atrophy of left kidney    Kidney cysts    History of colon polyps    Acute on chronic respiratory failure with hypoxia    Pleural effusion, left    Pericardial effusion    Chronic respiratory failure with hypoxia, on home O2 therapy    Anemia, chronic renal failure, stage 4 (severe)    Lipoma of torso    Chronic diastolic heart failure    COPD (chronic obstructive pulmonary disease)    Non-intractable vomiting with nausea    Diastolic dysfunction, left ventricle    Acute congestive heart failure    ESRD (end stage renal disease) on dialysis    Acute encephalopathy    Hyperkalemia    Encephalopathy, metabolic    Opioid overdose     Past Medical History:   Diagnosis Date    Acute congestive heart failure 2/10/2020    Anemia     Bilateral renal cysts     Cataract     Chronic LBP 7/26/2012    Chronic pain     CKD (chronic kidney disease), stage IV     COPD (chronic obstructive pulmonary disease)     Dehydration     Encounter for blood transfusion     HTN (hypertension)     Lumbar spondylosis     Melanoma     of the lip    Metabolic bone disease     Migraines, neuralgic     Osteoporosis      Primary osteoarthritis of both knees     s/p Rt TKA    Pulmonary embolism with infarction     Seizures 1972    x1 only    Subdeltoid bursitis, L>R. 9/27/2012    Ulcer     Vitamin D deficiency disease               OBJECTIVE:     Vitals:    02/25/20 0600 02/25/20 0615 02/25/20 0630 02/25/20 0731   BP: 120/60      Pulse: 94 91 96    Resp: (!) 43 (!) 43 (!) 42    Temp:       TempSrc:       SpO2: (!) 91% 96% 96% (!) 94%   Weight:       Height:           Temp: 98.2 °F (36.8 °C) (02/25/20 0400)  Pulse: 96 (02/25/20 0630)  Resp: (!) 42 (02/25/20 0630)  BP: 120/60 (02/25/20 0600)  SpO2: (!) 94 % (02/25/20 0731)              Medications:   epoetin hemant-epbx  5,000 Units Intravenous Every Mon, Wed, Fri      naloxone (NARCAN) infusion 0.25 mg/hr (02/25/20 0628)     Physical Exam:  General appearance: NAD  No SOB  No CP  Lungs: Diminished breath sounds  Heart: pulse 96  Abdomen: soft  Extremities: edema  Skin: dry  Laboratory:  ABG  Labs reviewed  Recent Results (from the past 336 hour(s))   Basic metabolic panel    Collection Time: 02/24/20  8:18 AM   Result Value Ref Range    Sodium 134 (L) 136 - 145 mmol/L    Potassium 6.2 (H) 3.5 - 5.1 mmol/L    Chloride 97 95 - 110 mmol/L    CO2 28 23 - 29 mmol/L    BUN, Bld 45 (H) 8 - 23 mg/dL    Creatinine 3.4 (H) 0.5 - 1.4 mg/dL    Calcium 9.1 8.7 - 10.5 mg/dL    Anion Gap 9 8 - 16 mmol/L   Basic metabolic panel    Collection Time: 02/24/20  3:57 AM   Result Value Ref Range    Sodium 140 136 - 145 mmol/L    Potassium 5.3 (H) 3.5 - 5.1 mmol/L    Chloride 99 95 - 110 mmol/L    CO2 29 23 - 29 mmol/L    BUN, Bld 46 (H) 8 - 23 mg/dL    Creatinine 3.6 (H) 0.5 - 1.4 mg/dL    Calcium 9.2 8.7 - 10.5 mg/dL    Anion Gap 12 8 - 16 mmol/L   Basic metabolic panel    Collection Time: 02/21/20  7:20 AM   Result Value Ref Range    Sodium 136 136 - 145 mmol/L    Potassium 3.2 (L) 3.5 - 5.1 mmol/L    Chloride 93 (L) 95 - 110 mmol/L    CO2 31 (H) 23 - 29 mmol/L    BUN, Bld 21 8 - 23 mg/dL     Creatinine 2.9 (H) 0.5 - 1.4 mg/dL    Calcium 9.2 8.7 - 10.5 mg/dL    Anion Gap 12 8 - 16 mmol/L     Recent Results (from the past 336 hour(s))   CBC auto differential    Collection Time: 02/24/20  3:57 AM   Result Value Ref Range    WBC 7.86 3.90 - 12.70 K/uL    Hemoglobin 9.0 (L) 12.0 - 16.0 g/dL    Hematocrit 31.8 (L) 37.0 - 48.5 %    Platelets 156 150 - 350 K/uL   CBC auto differential    Collection Time: 02/23/20 11:12 PM   Result Value Ref Range    WBC 6.67 3.90 - 12.70 K/uL    Hemoglobin 7.9 (L) 12.0 - 16.0 g/dL    Hematocrit 27.1 (L) 37.0 - 48.5 %    Platelets 112 (L) 150 - 350 K/uL   CBC auto differential    Collection Time: 02/21/20  7:20 AM   Result Value Ref Range    WBC 6.38 3.90 - 12.70 K/uL    Hemoglobin 8.7 (L) 12.0 - 16.0 g/dL    Hematocrit 29.6 (L) 37.0 - 48.5 %    Platelets 107 (L) 150 - 350 K/uL     Urinalysis  No results for input(s): COLORU, CLARITYU, SPECGRAV, PHUR, PROTEINUA, GLUCOSEU, BILIRUBINCON, BLOODU, WBCU, RBCU, BACTERIA, MUCUS, NITRITE, LEUKOCYTESUR, UROBILINOGEN, HYALINECASTS in the last 24 hours.    Diagnostic Results:  X-Ray: Reviewed  US: Reviewed  Echo: Reviewed  ACCESS    ASSESSMENT/PLAN:       ESRD  K 4  Hypomagnesemia  Hyponatremia  Metabolic bone disease  Vit d deficiency  Anemia secondary to ESRD  Hb 9  Blood pressure 120/60  Dialysis am

## 2020-02-25 NOTE — PROGRESS NOTES
"Surgery follow up  BP (!) 120/59   Pulse 99   Temp 98.3 °F (36.8 °C) (Oral)   Resp (!) 44   Ht 5' 5" (1.651 m)   Wt 58.9 kg (129 lb 13.6 oz)   LMP  (LMP Unknown)   SpO2 (!) 94%   Breastfeeding? No   BMI 21.61 kg/m²   I/O last 3 completed shifts:  In: 870.6 [P.O.:165; I.V.:305.6; Other:400]  Out: 3980 [Urine:2680; Other:1300]  No intake/output data recorded.      Recent Results (from the past 336 hour(s))   CBC auto differential    Collection Time: 02/24/20  3:57 AM   Result Value Ref Range    WBC 7.86 3.90 - 12.70 K/uL    Hemoglobin 9.0 (L) 12.0 - 16.0 g/dL    Hematocrit 31.8 (L) 37.0 - 48.5 %    Platelets 156 150 - 350 K/uL   CBC auto differential    Collection Time: 02/23/20 11:12 PM   Result Value Ref Range    WBC 6.67 3.90 - 12.70 K/uL    Hemoglobin 7.9 (L) 12.0 - 16.0 g/dL    Hematocrit 27.1 (L) 37.0 - 48.5 %    Platelets 112 (L) 150 - 350 K/uL   CBC auto differential    Collection Time: 02/21/20  7:20 AM   Result Value Ref Range    WBC 6.38 3.90 - 12.70 K/uL    Hemoglobin 8.7 (L) 12.0 - 16.0 g/dL    Hematocrit 29.6 (L) 37.0 - 48.5 %    Platelets 107 (L) 150 - 350 K/uL     Recent Results (from the past 336 hour(s))   Basic metabolic panel    Collection Time: 02/25/20 10:47 AM   Result Value Ref Range    Sodium 138 136 - 145 mmol/L    Potassium 4.0 3.5 - 5.1 mmol/L    Chloride 96 95 - 110 mmol/L    CO2 30 (H) 23 - 29 mmol/L    BUN, Bld 23 8 - 23 mg/dL    Creatinine 1.9 (H) 0.5 - 1.4 mg/dL    Calcium 10.7 (H) 8.7 - 10.5 mg/dL    Anion Gap 12 8 - 16 mmol/L   Basic metabolic panel    Collection Time: 02/24/20  8:18 AM   Result Value Ref Range    Sodium 134 (L) 136 - 145 mmol/L    Potassium 6.2 (H) 3.5 - 5.1 mmol/L    Chloride 97 95 - 110 mmol/L    CO2 28 23 - 29 mmol/L    BUN, Bld 45 (H) 8 - 23 mg/dL    Creatinine 3.4 (H) 0.5 - 1.4 mg/dL    Calcium 9.1 8.7 - 10.5 mg/dL    Anion Gap 9 8 - 16 mmol/L   Basic metabolic panel    Collection Time: 02/24/20  3:57 AM   Result Value Ref Range    Sodium 140 136 - 145 " mmol/L    Potassium 5.3 (H) 3.5 - 5.1 mmol/L    Chloride 99 95 - 110 mmol/L    CO2 29 23 - 29 mmol/L    BUN, Bld 46 (H) 8 - 23 mg/dL    Creatinine 3.6 (H) 0.5 - 1.4 mg/dL    Calcium 9.2 8.7 - 10.5 mg/dL    Anion Gap 12 8 - 16 mmol/L   graft worked ok yesterday , mild venous hypertension,  Recommend consult  for fistulogram and ballooning .  May eat today.

## 2020-02-26 PROBLEM — Z71.89 COUNSELING REGARDING ADVANCE CARE PLANNING AND GOALS OF CARE: Status: ACTIVE | Noted: 2020-02-26

## 2020-02-26 PROBLEM — Z51.5 PALLIATIVE CARE ENCOUNTER: Status: ACTIVE | Noted: 2020-02-26

## 2020-02-26 PROBLEM — Z71.89 GOALS OF CARE, COUNSELING/DISCUSSION: Status: ACTIVE | Noted: 2020-02-26

## 2020-02-26 LAB
ANION GAP SERPL CALC-SCNC: 11 MMOL/L (ref 8–16)
BUN SERPL-MCNC: 27 MG/DL (ref 8–23)
CALCIUM SERPL-MCNC: 10 MG/DL (ref 8.7–10.5)
CHLORIDE SERPL-SCNC: 92 MMOL/L (ref 95–110)
CO2 SERPL-SCNC: 30 MMOL/L (ref 23–29)
CREAT SERPL-MCNC: 2.5 MG/DL (ref 0.5–1.4)
EST. GFR  (AFRICAN AMERICAN): 21 ML/MIN/1.73 M^2
EST. GFR  (NON AFRICAN AMERICAN): 18 ML/MIN/1.73 M^2
GLUCOSE SERPL-MCNC: 208 MG/DL (ref 70–110)
MAGNESIUM SERPL-MCNC: 2.4 MG/DL (ref 1.6–2.6)
PHOSPHATE SERPL-MCNC: 3 MG/DL (ref 2.7–4.5)
POCT GLUCOSE: 120 MG/DL (ref 70–110)
POTASSIUM SERPL-SCNC: 3.6 MMOL/L (ref 3.5–5.1)
SODIUM SERPL-SCNC: 133 MMOL/L (ref 136–145)

## 2020-02-26 PROCEDURE — 25000003 PHARM REV CODE 250: Mod: HCNC | Performed by: NURSE PRACTITIONER

## 2020-02-26 PROCEDURE — 11000001 HC ACUTE MED/SURG PRIVATE ROOM: Mod: HCNC

## 2020-02-26 PROCEDURE — 99223 PR INITIAL HOSPITAL CARE,LEVL III: ICD-10-PCS | Mod: HCNC,,, | Performed by: INTERNAL MEDICINE

## 2020-02-26 PROCEDURE — S0073 INJECTION, AZTREONAM, 500 MG: HCPCS | Mod: HCNC | Performed by: INTERNAL MEDICINE

## 2020-02-26 PROCEDURE — 94761 N-INVAS EAR/PLS OXIMETRY MLT: CPT | Mod: HCNC

## 2020-02-26 PROCEDURE — 84100 ASSAY OF PHOSPHORUS: CPT | Mod: HCNC

## 2020-02-26 PROCEDURE — 99233 SBSQ HOSP IP/OBS HIGH 50: CPT | Mod: HCNC,,, | Performed by: NURSE PRACTITIONER

## 2020-02-26 PROCEDURE — P9045 ALBUMIN (HUMAN), 5%, 250 ML: HCPCS | Mod: JG,HCNC | Performed by: INTERNAL MEDICINE

## 2020-02-26 PROCEDURE — 25000003 PHARM REV CODE 250: Mod: HCNC | Performed by: INTERNAL MEDICINE

## 2020-02-26 PROCEDURE — 63600175 PHARM REV CODE 636 W HCPCS: Mod: JG,HCNC | Performed by: INTERNAL MEDICINE

## 2020-02-26 PROCEDURE — 83735 ASSAY OF MAGNESIUM: CPT | Mod: HCNC

## 2020-02-26 PROCEDURE — 27100171 HC OXYGEN HIGH FLOW UP TO 24 HOURS: Mod: HCNC

## 2020-02-26 PROCEDURE — 99223 1ST HOSP IP/OBS HIGH 75: CPT | Mod: HCNC,,, | Performed by: INTERNAL MEDICINE

## 2020-02-26 PROCEDURE — 99233 PR SUBSEQUENT HOSPITAL CARE,LEVL III: ICD-10-PCS | Mod: HCNC,,, | Performed by: NURSE PRACTITIONER

## 2020-02-26 PROCEDURE — 36415 COLL VENOUS BLD VENIPUNCTURE: CPT | Mod: HCNC

## 2020-02-26 PROCEDURE — 99900035 HC TECH TIME PER 15 MIN (STAT): Mod: HCNC

## 2020-02-26 PROCEDURE — 63600175 PHARM REV CODE 636 W HCPCS: Mod: HCNC | Performed by: INTERNAL MEDICINE

## 2020-02-26 PROCEDURE — 97166 OT EVAL MOD COMPLEX 45 MIN: CPT | Mod: HCNC

## 2020-02-26 PROCEDURE — 97161 PT EVAL LOW COMPLEX 20 MIN: CPT | Mod: HCNC

## 2020-02-26 PROCEDURE — 80048 BASIC METABOLIC PNL TOTAL CA: CPT | Mod: HCNC

## 2020-02-26 PROCEDURE — 80100014 HC HEMODIALYSIS 1:1: Mod: HCNC

## 2020-02-26 PROCEDURE — 27100092 HC HIGH FLOW DELIVERY CANNULA: Mod: HCNC

## 2020-02-26 RX ORDER — HEPARIN SODIUM 1000 [USP'U]/ML
1000 INJECTION, SOLUTION INTRAVENOUS; SUBCUTANEOUS ONCE
Status: COMPLETED | OUTPATIENT
Start: 2020-02-26 | End: 2020-02-26

## 2020-02-26 RX ORDER — SODIUM CHLORIDE 9 MG/ML
INJECTION, SOLUTION INTRAVENOUS ONCE
Status: COMPLETED | OUTPATIENT
Start: 2020-02-26 | End: 2020-02-26

## 2020-02-26 RX ORDER — DIPHENHYDRAMINE HCL 50 MG
50 CAPSULE ORAL ONCE
Status: DISCONTINUED | OUTPATIENT
Start: 2020-02-26 | End: 2020-02-27 | Stop reason: HOSPADM

## 2020-02-26 RX ORDER — PARICALCITOL 5 UG/ML
1 INJECTION, SOLUTION INTRAVENOUS
Status: DISCONTINUED | OUTPATIENT
Start: 2020-02-26 | End: 2020-02-29 | Stop reason: HOSPADM

## 2020-02-26 RX ORDER — SODIUM CHLORIDE 9 MG/ML
INJECTION, SOLUTION INTRAVENOUS
Status: DISCONTINUED | OUTPATIENT
Start: 2020-02-26 | End: 2020-02-29 | Stop reason: HOSPADM

## 2020-02-26 RX ADMIN — ACETAMINOPHEN 650 MG: 325 TABLET ORAL at 10:02

## 2020-02-26 RX ADMIN — AZTREONAM 1000 MG: 1 INJECTION, POWDER, LYOPHILIZED, FOR SOLUTION INTRAMUSCULAR; INTRAVENOUS at 12:02

## 2020-02-26 RX ADMIN — SODIUM CHLORIDE 500 ML: 0.9 INJECTION, SOLUTION INTRAVENOUS at 10:02

## 2020-02-26 RX ADMIN — AZTREONAM 500 MG: 1 INJECTION, POWDER, LYOPHILIZED, FOR SOLUTION INTRAMUSCULAR; INTRAVENOUS at 08:02

## 2020-02-26 RX ADMIN — ACETAMINOPHEN 650 MG: 325 TABLET ORAL at 08:02

## 2020-02-26 RX ADMIN — PARICALCITOL 1 MCG: 5 INJECTION, SOLUTION INTRAVENOUS at 01:02

## 2020-02-26 RX ADMIN — PARICALCITOL 1 MCG: 5 INJECTION, SOLUTION INTRAVENOUS at 12:02

## 2020-02-26 RX ADMIN — VANCOMYCIN HYDROCHLORIDE 1250 MG: 1.25 INJECTION, POWDER, LYOPHILIZED, FOR SOLUTION INTRAVENOUS at 01:02

## 2020-02-26 RX ADMIN — EPOETIN ALFA-EPBX 5000 UNITS: 3000 INJECTION, SOLUTION INTRAVENOUS; SUBCUTANEOUS at 10:02

## 2020-02-26 RX ADMIN — AZTREONAM 500 MG: 1 INJECTION, POWDER, LYOPHILIZED, FOR SOLUTION INTRAMUSCULAR; INTRAVENOUS at 05:02

## 2020-02-26 RX ADMIN — HEPARIN SODIUM 1000 UNITS: 1000 INJECTION, SOLUTION INTRAVENOUS; SUBCUTANEOUS at 10:02

## 2020-02-26 NOTE — PT/OT/SLP EVAL
Occupational Therapy   Evaluation    Name: Katie Quevedo  MRN: [id number]  Admitting Diagnosis:  Opioid overdose 2 Days Post-Op    Recommendations:     Discharge Recommendations: home health PT, home health OT  Discharge Equipment Recommendations:  (none)  Barriers to discharge:  None    Assessment:     Katie Quevedo is a 74 y.o. female with a medical diagnosis of Opioid overdose.  She presents with deconditioning, increased O2 needs at this time compared to baseline. Performance deficits affecting function: weakness, impaired endurance, impaired self care skills, impaired functional mobilty, decreased upper extremity function, decreased lower extremity function, decreased ROM, impaired cardiopulmonary response to activity.      Rehab Prognosis: Good; patient would benefit from acute skilled OT services to address these deficits and reach maximum level of function.       Plan:     Patient to be seen 5 x/week to address the above listed problems via self-care/home management, therapeutic activities, therapeutic exercises  · Plan of Care Expires: 03/26/20  · Plan of Care Reviewed with: patient, spouse    Subjective     Chief Complaint: Pt states 5/10 LBP, itchiness on back  Patient/Family Comments/goals: to return to PLOF    Occupational Profile:  Living Environment: Pt lives with spouse SSH, THE, walk in tub  Previous level of function: Mod I for functional mobility, Mod I to min A for ADLs; limited endurance and tolerance for activity 2/2 SOB per pt and spouse report  Roles and Routines: Caretaker to self, drives occasionally. Light meal prep occasionally but pt reports that it is difficult for her to hold items and walk with them 2/2 requiring use of AD for gait stability. Min A for waistband management for LE dressing, does not currently complete grocery shopping but is interested in going shopping as form of therapeutic activity  Equipment Used at Home:  nebulizer, oxygen, power chair, cane, straight, rollator, grab  bar  Assistance upon Discharge: Family    Pain/Comfort:  Pain Rating 1: 5/10  Location - Side 1: Bilateral  Location - Orientation 1: lower  Location 1: back  Pain Addressed 1: Reposition, Distraction, Cessation of Activity  Pain Rating Post-Intervention 1: (did not rate)    Patients cultural, spiritual, Zoroastrian conflicts given the current situation:      Objective:     Communicated with: jeremias prior to session.  Patient found HOB elevated with blood pressure cuff, pulse ox (continuous), peripheral IV, oxygen, SCD, telemetry(Vapotherm) upon OT entry to room.    General Precautions: Standard, fall   Orthopedic Precautions:N/A   Braces: N/A     Occupational Performance:    Bed Mobility:    · Patient completed Rolling/Turning to Left with  supervision  · Patient completed Scooting/Bridging with supervision  · Patient completed Supine to Sit with supervision    Functional Mobility/Transfers:  · Patient completed Sit <> Stand Transfer with stand by assistance  with  rolling walker   · Patient completed Bed <> Chair Transfer using Step Transfer technique with stand by assistance with rolling walker  Functional Mobility: Pt with fair dynamic seated and standing balance.  ·      Activities of Daily Living:  · Upper Body Dressing: minimum assistance to don gown as robe seated EOB 2/2 active PIV  · Lower Body Dressing: stand by assistance to don/doff B socks seated EOB, increased time required to maintain spO2 sats    Cognitive/Visual Perceptual:  Cognitive/Psychosocial Skills:     -       Oriented to: Person, Place, Time and Situation   -       Follows Commands/attention:Follows multistep  commands  -       Communication: clear/fluent  -       Memory: No Deficits noted  -       Safety awareness/insight to disability: intact   -       Mood/Affect/Coping skills/emotional control: Appropriate to situation    Physical Exam:  Postural examination/scapula alignment:    -       Rounded Shoulders, forward head  Skin integrity:  Visible skin intact  Motor Planning:    -       WFL  Upper Extremity Range of Motion: BUE WFL     Upper Extremity Strength:  BUE grossly 3+/5   Strength:  B hands 3+/5  Fine Motor Coordination:    -       Intact  Gross motor coordination:   WFL, mildly tremulous at beginning of session but resolved as session continued    AMPAC 6 Click ADL:  AMPA Total Score: 21    Treatment & Education:  Pt educated on role of OT and POC.   Pt performing skills as listed above.   Education:    Patient left up in chair with all lines intact, call button in reach, nsg notified and spouse present    GOALS:   Multidisciplinary Problems     Occupational Therapy Goals        Problem: Occupational Therapy Goal    Goal Priority Disciplines Outcome Interventions   Occupational Therapy Goal     OT, PT/OT Ongoing, Progressing    Description:  Goals to be met by: 03/26/2020     Patient will increase functional independence with ADLs by performing:    LE Dressing with Stand-by Assistance.  Grooming while standing with Supervision.  Toileting from bedside commode with Supervision for hygiene and clothing management.   Step transfer with Supervision  Toilet transfer to toilet with Supervision.  Increased functional strength to WFL for self care.  Upper extremity exercise program x10 reps per handout, with supervision.                      History:     Past Medical History:   Diagnosis Date    Acute congestive heart failure 2/10/2020    Anemia     Bilateral renal cysts     Cataract     Chronic LBP 7/26/2012    Chronic pain     CKD (chronic kidney disease), stage IV     COPD (chronic obstructive pulmonary disease)     Dehydration     Encounter for blood transfusion     HTN (hypertension)     Lumbar spondylosis     Melanoma     of the lip    Metabolic bone disease     Migraines, neuralgic     Osteoporosis     Primary osteoarthritis of both knees     s/p Rt TKA    Pulmonary embolism with infarction     Seizures 1972    x1 only     Subdeltoid bursitis, L>R. 9/27/2012    Ulcer     Vitamin D deficiency disease          Past Surgical History:   Procedure Laterality Date    BLADDER SUSPENSION      CATARACT EXTRACTION  11/18/13    left eye    CERVICAL LAMINECTOMY      x3, fusion x1    COLONOSCOPY  2009    HYSTERECTOMY      JOINT REPLACEMENT  2001    total right knee     LUMBAR LAMINECTOMY      x 3, fusion x1    OOPHORECTOMY      PLACEMENT OF ARTERIOVENOUS GRAFT Left 1/21/2020    Procedure: INSERTION, GRAFT, ARTERIOVENOUS;  Surgeon: Lindsey Louie MD;  Location: [facility] OR;  Service: General;  Laterality: Left;    THROMBECTOMY Left 2/3/2020    Procedure: THROMBECTOMY;  Surgeon: Lindsey Louie MD;  Location: [facility] OR;  Service: General;  Laterality: Left;       Time Tracking:     OT Date of Treatment: 02/26/20  OT Start Time: 1424  OT Stop Time: 1445  OT Total Time (min): 21 min    Billable Minutes:Evaluation 10 Co Tx PT    Tabatha Macias OT  2/26/2020

## 2020-02-26 NOTE — PLAN OF CARE
Pt AAOX4. NAD. Denies pain. Able to make needs known. Pt O2 at 92% on Vapo therm 15/30. NSR. MAP 70s. Needed albumin bolus of 500cc for MAP below 65. Pt given IV vanc and azactam for LLL Plueral effusion. Plans for cardio consult to place stent in fistula AV graft. Safety maintained. SR up x3. Call light in reach. Family not present at bedside.

## 2020-02-26 NOTE — PROGRESS NOTES
Pt unable to void, attempted x2 before and after bladder scan with no results. Bladder scan >221, NP notified

## 2020-02-26 NOTE — PROGRESS NOTES
[facility] Hospital Medicine  Progress Note    Patient Name: Katie Quevedo  MRN: [id number]  Patient Class: IP- Inpatient   Admission Date: 2/23/2020  Length of Stay: 2 days  Attending Physician: Jennifer Bowles*  Primary Care Provider: Kingston Verduzco MD        Subjective:     Principal Problem:Opioid overdose        HPI:  Katie Quevedo is a 73 y/o female with PMHx of COPD on 2 liters home oxygen at baseline, chronic pain, lumbar spondylosis, vitamin D deficiency dx, anemia, migraines, osteoarthritis, HTN,  PE with infarction, CKD stage IV, seizures, lumbar spondylosis, pulmonary embolism and acute CHF, who presents 2 hours history of AMS. Her  notes that she had eaten dinner well without any problems and later became unresponsive. Patient recently started on dialysis and has not dialyzed in 1 week since her dialysis access has not been functioning.  Patient was scheduled to have her access de-clotted 2/24 AM.  SBP 60-70s in ED. Requiring  narcan gtt. Taking norco 10 at home     Overview/Hospital Course:  No notes on file    Interval History: awake and alert, on high flow.   Possible step down .      Review of Systems   Respiratory: Negative for cough, chest tightness and shortness of breath.    Genitourinary: Negative for frequency and hematuria.   Musculoskeletal: Negative for back pain.   Neurological: Positive for weakness. Negative for light-headedness.   Psychiatric/Behavioral: Negative for confusion.     Objective:     Vital Signs (Most Recent):  Temp: 97.9 °F (36.6 °C) (02/26/20 0710)  Pulse: 89 (02/26/20 0900)  Resp: (!) 27 (02/26/20 0900)  BP: (!) 95/50 (02/26/20 0900)  SpO2: 98 % (02/26/20 0900) Vital Signs (24h Range):  Temp:  [97.8 °F (36.6 °C)-98.6 °F (37 °C)] 97.9 °F (36.6 °C)  Pulse:  [] 89  Resp:  [24-52] 27  SpO2:  [88 %-99 %] 98 %  BP: ()/(38-83) 95/50     Weight: 58.9 kg (129 lb 13.6 oz)  Body mass index is 21.61 kg/m².    Intake/Output Summary (Last 24  hours) at 2/26/2020 0943  Last data filed at 2/26/2020 0600  Gross per 24 hour   Intake 1165 ml   Output 700 ml   Net 465 ml      Physical Exam   Constitutional: She is oriented to person, place, and time. She appears well-developed and well-nourished.   HENT:   Head: Normocephalic and atraumatic.   Neck: Neck supple.   Cardiovascular: Normal rate, regular rhythm, normal heart sounds and intact distal pulses.   Pulmonary/Chest: Tachypnea noted. No respiratory distress. She has no wheezes. She has no rales.   Abdominal: Soft. Bowel sounds are normal. She exhibits no distension.   Musculoskeletal: Normal range of motion. She exhibits no edema or tenderness.   Neurological: She is alert and oriented to person, place, and time.   Skin: Skin is warm and dry. Capillary refill takes less than 2 seconds.   Psychiatric: She has a normal mood and affect.       Significant Labs:   ABGs:   Recent Labs   Lab 02/24/20  1641   PH 7.435   PCO2 38.7   HCO3 26.0   POCSATURATED 93*   BE 2     Blood Culture:   No results for input(s): LABBLOO in the last 48 hours.  CBC:   Recent Labs   Lab 02/24/20  1641   HCT 32*     CMP:   Recent Labs   Lab 02/25/20  1047 02/26/20  0427    133*  133*  133*   K 4.0 3.6  3.6  3.6   CL 96 92*  92*  92*   CO2 30* 30*  30*  30*   * 208*  208*  208*   BUN 23 27*  27*  27*   CREATININE 1.9* 2.5*  2.5*  2.5*   CALCIUM 10.7* 10.0  10.0  10.0   ANIONGAP 12 11  11  11   EGFRNONAA 25* 18*  18*  18*     Cardiac Markers:   No results for input(s): CKMB, MYOGLOBIN, BNP, TROPISTAT in the last 48 hours.  Troponin:   No results for input(s): TROPONINI in the last 48 hours.  TSH:   Recent Labs   Lab 02/23/20  2312   TSH 2.189     Urine Culture: No results for input(s): LABURIN in the last 48 hours.  Recent Lab Results       02/26/20  0427   02/25/20  1047        Anion Gap 11 12      11        11       BUN, Bld 27 23      27        27       Calcium 10.0 10.7      10.0        10.0        Chloride 92 96      92        92       CO2 30 30      30        30       Creatinine 2.5 1.9      2.5        2.5       eGFR if  21 29      21        21       eGFR if non  18  Comment:  Calculation used to obtain the estimated glomerular filtration  rate (eGFR) is the CKD-EPI equation.    25  Comment:  Calculation used to obtain the estimated glomerular filtration  rate (eGFR) is the CKD-EPI equation.         18  Comment:  Calculation used to obtain the estimated glomerular filtration  rate (eGFR) is the CKD-EPI equation.           18  Comment:  Calculation used to obtain the estimated glomerular filtration  rate (eGFR) is the CKD-EPI equation.          Glucose 208 118      208        208       Magnesium 2.4       Phosphorus 3.0       Potassium 3.6 4.0      3.6        3.6       Sodium 133 138      133        133             Significant Imaging: I have reviewed all pertinent imaging results/findings within the past 24 hours.      Assessment/Plan:      Acute encephalopathy  Opioid overdose  Chronic neck pain  Chronic pain  ESRD (end stage renal disease) on dialysis  Hyperkalemia  Taking norco 10 at home   Requiring  narcan gtt.   Patient with non function dialysis catheter planned for de-clotting today   Consult nephrology  Avoid nephrology toxic agents  renally dose medications  Consult surgery for graft declotting today    ESRD (end stage renal disease) on dialysis          Chronic respiratory failure with hypoxia, on home O2 therapy  COPD (chronic obstructive pulmonary disease)  Continue supplemental O2, prn nebs       Essential hypertension  Hold Norvasc  Monitor        VTE Risk Mitigation (From admission, onward)         Ordered     heparin (porcine) injection 1,000 Units  Once      02/26/20 1004     IP VTE HIGH RISK PATIENT  Once      02/24/20 0343     Place sequential compression device  Until discontinued      02/24/20 0343     Place DAWN hose  Until discontinued      02/24/20 0056                 Critical care time spent on the evaluation and treatment of severe organ dysfunction, review of pertinent labs and imaging studies, discussions with consulting providers and discussions with patient/family: 35 minutes.      Jennifer Bowles MD  Department of Hospital Medicine   [facility]

## 2020-02-26 NOTE — SUBJECTIVE & OBJECTIVE
Past Medical History:   Diagnosis Date    Acute congestive heart failure 2/10/2020    Anemia     Bilateral renal cysts     Cataract     Chronic LBP 7/26/2012    Chronic pain     CKD (chronic kidney disease), stage IV     COPD (chronic obstructive pulmonary disease)     Dehydration     Encounter for blood transfusion     HTN (hypertension)     Lumbar spondylosis     Melanoma     of the lip    Metabolic bone disease     Migraines, neuralgic     Osteoporosis     Primary osteoarthritis of both knees     s/p Rt TKA    Pulmonary embolism with infarction     Seizures 1972    x1 only    Subdeltoid bursitis, L>R. 9/27/2012    Ulcer     Vitamin D deficiency disease        Past Surgical History:   Procedure Laterality Date    BLADDER SUSPENSION      CATARACT EXTRACTION  11/18/13    left eye    CERVICAL LAMINECTOMY      x3, fusion x1    COLONOSCOPY  2009    HYSTERECTOMY      JOINT REPLACEMENT  2001    total right knee     LUMBAR LAMINECTOMY      x 3, fusion x1    OOPHORECTOMY      PLACEMENT OF ARTERIOVENOUS GRAFT Left 1/21/2020    Procedure: INSERTION, GRAFT, ARTERIOVENOUS;  Surgeon: Lindsey Louie MD;  Location: [facility] OR;  Service: General;  Laterality: Left;    THROMBECTOMY Left 2/3/2020    Procedure: THROMBECTOMY;  Surgeon: Lindsey Louie MD;  Location: [facility] OR;  Service: General;  Laterality: Left;       Review of patient's allergies indicates:   Allergen Reactions    Aspirin      Other reaction(s): hx of ulcers    Tetracycline Swelling     Other reaction(s): Swelling    Penicillins Rash     Other reaction(s): Hives  Other reaction(s): Rash  Other reaction(s): Rash  Other reaction(s): Hives       No current facility-administered medications on file prior to encounter.      Current Outpatient Medications on File Prior to Encounter   Medication Sig    albuterol-ipratropium (DUO-NEB) 2.5 mg-0.5 mg/3 mL nebulizer solution Take 3 mLs by nebulization every 6 (six) hours as needed for  Shortness of Breath. Rescue    allopurinoL (ZYLOPRIM) 100 MG tablet Take 1 tablet (100 mg total) by mouth on Tuesday, Thursday, Saturday, and Sunday.    amLODIPine (NORVASC) 2.5 MG tablet Take 2.5 mg by mouth once daily.     busPIRone (BUSPAR) 15 MG tablet Take 1 tablet (15 mg total) by mouth 2 (two) times daily.    clonazePAM (KLONOPIN) 1 MG tablet Take 1 tablet (1 mg total) by mouth 2 (two) times daily as needed for Anxiety.    cyproheptadine (PERIACTIN) 4 mg tablet Take 1 tablet (4 mg total) by mouth every 8 (eight) hours.    diphenhydrAMINE (BENADRYL) 25 mg capsule Take 25 mg by mouth every 6 (six) hours as needed for Itching.    ergocalciferol (ERGOCALCIFEROL) 50,000 unit Cap Take 1 capsule by mouth once weekly for 10 weeks, then take 1 capsule by mouth once monthly.    fluticasone-umeclidin-vilanter (TRELEGY ELLIPTA) 100-62.5-25 mcg DsDv Inhale 1 puff by mouth into the lungs once daily.    furosemide (LASIX) 80 MG tablet Take 1 tablet (80 mg total) by mouth twice daily on non dialysis days Tuesday, Thursday, Saturday, and Sunday.    furosemide (LASIX) 80 MG tablet Take 1 tablet (80 mg total) by mouth 2 (two) times daily.    HYDROcodone-acetaminophen (NORCO)  mg per tablet Take 1 tablet by mouth 3 (three) times daily as needed.    levoFLOXacin (LEVAQUIN) 750 MG tablet Take 1 tablet (750 mg total) by mouth once daily.    lidocaine-prilocaine (EMLA) cream Apply topically to left arm prior to dialysis.    mupirocin (BACTROBAN) 2 % ointment apply by Nasal route 2 (two) times daily.    ondansetron (ZOFRAN-ODT) 4 MG TbDL Dissolve one tablet under the tongue every 6 (six) hours as needed.    oseltamivir (TAMIFLU) 30 MG capsule Take 1 capsule (30 mg total) by mouth once daily. for 4 days    potassium chloride SA (K-DUR,KLOR-CON) 20 MEQ tablet Take 2 tablets (40 mEq total) by mouth once daily.    sodium bicarbonate 650 MG tablet Take 1 tablet (650 mg total) by mouth 2 (two) times daily.     traZODone (DESYREL) 100 MG tablet Take 100 mg by mouth nightly as needed for Insomnia.    zolpidem (AMBIEN) 5 MG Tab Take 5 mg by mouth every evening.      Family History     Problem Relation (Age of Onset)    Arthritis Mother    Cancer Father    Cataracts Father, Sister    Diabetes Maternal Aunt    Glaucoma Cousin    Heart attack Maternal Grandfather    Heart disease Maternal Grandfather    Hypertension Father, Maternal Grandfather    Stroke Mother        Tobacco Use    Smoking status: Former Smoker     Packs/day: 1.00     Types: Cigarettes    Smokeless tobacco: Former User     Quit date: 2/3/2015   Substance and Sexual Activity    Alcohol use: Not Currently     Comment: Rare    Drug use: No    Sexual activity: Never     Partners: Male     Review of Systems   Unable to perform ROS: acuity of condition     Objective:     Vital Signs (Most Recent):  Temp: 97.9 °F (36.6 °C) (02/26/20 1000)  Pulse: 104 (02/26/20 1130)  Resp: (!) 27 (02/26/20 1130)  BP: (!) 120/56 (02/26/20 1130)  SpO2: (!) 92 % (02/26/20 1130) Vital Signs (24h Range):  Temp:  [97.8 °F (36.6 °C)-98.6 °F (37 °C)] 97.9 °F (36.6 °C)  Pulse:  [] 104  Resp:  [24-52] 27  SpO2:  [88 %-99 %] 92 %  BP: ()/(38-83) 120/56     Weight: 58.9 kg (129 lb 13.6 oz)  Body mass index is 21.61 kg/m².    SpO2: (!) 92 %  O2 Device (Oxygen Therapy): Vapotherm      Intake/Output Summary (Last 24 hours) at 2/26/2020 1150  Last data filed at 2/26/2020 0600  Gross per 24 hour   Intake 1165 ml   Output 550 ml   Net 615 ml       Lines/Drains/Airways     Drain                 Hemodialysis AV Graft Left upper arm -- days    Female External Urinary Catheter 02/25/20 1415 less than 1 day          Airway                 Airway - Non-Surgical 02/03/20 1252 Nasal Cannula 22 days          Peripheral Intravenous Line                 Peripheral IV - Single Lumen 02/25/20 2009 22 G Anterior;Right Wrist less than 1 day                Physical Exam   Constitutional: No  distress.   HENT:   Head: Atraumatic.   Eyes: Right eye exhibits no discharge. Left eye exhibits no discharge.   Neck: No JVD present.   Cardiovascular: Normal rate, regular rhythm and normal heart sounds. Exam reveals no gallop and no friction rub.   No murmur heard.  Pulmonary/Chest: Effort normal. She has decreased breath sounds. She has no rales.   Abdominal: Soft. Bowel sounds are normal.   Musculoskeletal: She exhibits no edema.   Skin: Skin is warm and dry. She is not diaphoretic.       Significant Labs:   CMP   Recent Labs   Lab 02/25/20  1047 02/26/20  0427    133*  133*  133*   K 4.0 3.6  3.6  3.6   CL 96 92*  92*  92*   CO2 30* 30*  30*  30*   * 208*  208*  208*   BUN 23 27*  27*  27*   CREATININE 1.9* 2.5*  2.5*  2.5*   CALCIUM 10.7* 10.0  10.0  10.0   ANIONGAP 12 11  11  11   ESTGFRAFRICA 29* 21*  21*  21*   EGFRNONAA 25* 18*  18*  18*   , CBC   Recent Labs   Lab 02/24/20  1641   HCT 32*   , INR No results for input(s): INR, PROTIME in the last 48 hours., Lipid Panel No results for input(s): CHOL, HDL, LDLCALC, TRIG, CHOLHDL in the last 48 hours., Troponin No results for input(s): TROPONINI in the last 48 hours. and All pertinent lab results from the last 24 hours have been reviewed.    Significant Imaging: Echocardiogram:   Transthoracic echo (TTE) complete (Cupid Only):   Results for orders placed or performed during the hospital encounter of 09/13/19   Echo Color Flow Doppler? Yes; Bubble Contrast? No   Result Value Ref Range    BSA 1.64 m2    LA WIDTH 2.30 cm    AORTIC VALVE CUSP SEPERATION 1.67 cm    PV PEAK VELOCITY 0.96 cm/s    LVIDD 3.48 (A) 3.5 - 6.0 cm    IVS 0.42 (A) 0.6 - 1.1 cm    PW 0.61 0.6 - 1.1 cm    Ao root annulus 2.19 cm    LVIDS 2.28 2.1 - 4.0 cm    FS 34 28 - 44 %    LA volume 36.54 cm3    LV mass 41.86 g    LA size 4.04 cm    Left Ventricle Relative Wall Thickness 0.35 cm    AV mean gradient 5 mmHg    AV Velocity Ratio 0.92     AV index  (prosthetic) 1.09     E/A ratio 2.21     E wave decelartion time 112.59 msec    IVRT 0.07 msec    LVOT peak landon 1.40 m/s    LVOT peak VTI 30.41 cm    Ao peak landon 1.52 m/s    Ao VTI 27.98 cm    AV peak gradient 9 mmHg    MV Peak E Landon 1.17 m/s    MV Peak A Landon 0.53 m/s    LV Systolic Volume 17.67 mL    LV Systolic Volume Index 11.2 mL/m2    LV Diastolic Volume 50.14 mL    LV Diastolic Volume Index 31.68 mL/m2    LA Volume Index 23.1 mL/m2    LV Mass Index 26 g/m2    RA Major Axis 3.75 cm    Left Atrium Minor Axis 4.94 cm    Left Atrium Major Axis 4.35 cm    Right Atrial Pressure (from IVC) 8 mmHg    Narrative    · Normal left ventricular systolic function. The estimated ejection   fraction is 55%  · Grade II (moderate) left ventricular diastolic dysfunction consistent   with pseudonormalization.  · Elevated left atrial pressure.  · No wall motion abnormalities.  · Normal right ventricular systolic function.  · No TR Jet to calculate PA pressure  · Intermediate central venous pressure (8 mm Hg).  · There is a large left pleural effusion.

## 2020-02-26 NOTE — SUBJECTIVE & OBJECTIVE
Interval History: awake and alert, on high flow.   Possible step down .      Review of Systems   Respiratory: Negative for cough, chest tightness and shortness of breath.    Genitourinary: Negative for frequency and hematuria.   Musculoskeletal: Negative for back pain.   Neurological: Positive for weakness. Negative for light-headedness.   Psychiatric/Behavioral: Negative for confusion.     Objective:     Vital Signs (Most Recent):  Temp: 97.9 °F (36.6 °C) (02/26/20 0710)  Pulse: 89 (02/26/20 0900)  Resp: (!) 27 (02/26/20 0900)  BP: (!) 95/50 (02/26/20 0900)  SpO2: 98 % (02/26/20 0900) Vital Signs (24h Range):  Temp:  [97.8 °F (36.6 °C)-98.6 °F (37 °C)] 97.9 °F (36.6 °C)  Pulse:  [] 89  Resp:  [24-52] 27  SpO2:  [88 %-99 %] 98 %  BP: ()/(38-83) 95/50     Weight: 58.9 kg (129 lb 13.6 oz)  Body mass index is 21.61 kg/m².    Intake/Output Summary (Last 24 hours) at 2/26/2020 0943  Last data filed at 2/26/2020 0600  Gross per 24 hour   Intake 1165 ml   Output 700 ml   Net 465 ml      Physical Exam   Constitutional: She is oriented to person, place, and time. She appears well-developed and well-nourished.   HENT:   Head: Normocephalic and atraumatic.   Neck: Neck supple.   Cardiovascular: Normal rate, regular rhythm, normal heart sounds and intact distal pulses.   Pulmonary/Chest: Tachypnea noted. No respiratory distress. She has no wheezes. She has no rales.   Abdominal: Soft. Bowel sounds are normal. She exhibits no distension.   Musculoskeletal: Normal range of motion. She exhibits no edema or tenderness.   Neurological: She is alert and oriented to person, place, and time.   Skin: Skin is warm and dry. Capillary refill takes less than 2 seconds.   Psychiatric: She has a normal mood and affect.       Significant Labs:   ABGs:   Recent Labs   Lab 02/24/20  1641   PH 7.435   PCO2 38.7   HCO3 26.0   POCSATURATED 93*   BE 2     Blood Culture:   No results for input(s): LABBLOO in the last 48 hours.  CBC:    Recent Labs   Lab 02/24/20  1641   HCT 32*     CMP:   Recent Labs   Lab 02/25/20  1047 02/26/20  0427    133*  133*  133*   K 4.0 3.6  3.6  3.6   CL 96 92*  92*  92*   CO2 30* 30*  30*  30*   * 208*  208*  208*   BUN 23 27*  27*  27*   CREATININE 1.9* 2.5*  2.5*  2.5*   CALCIUM 10.7* 10.0  10.0  10.0   ANIONGAP 12 11  11  11   EGFRNONAA 25* 18*  18*  18*     Cardiac Markers:   No results for input(s): CKMB, MYOGLOBIN, BNP, TROPISTAT in the last 48 hours.  Troponin:   No results for input(s): TROPONINI in the last 48 hours.  TSH:   Recent Labs   Lab 02/23/20  2312   TSH 2.189     Urine Culture: No results for input(s): LABURIN in the last 48 hours.  Recent Lab Results       02/26/20  0427   02/25/20  1047        Anion Gap 11 12      11        11       BUN, Bld 27 23      27        27       Calcium 10.0 10.7      10.0        10.0       Chloride 92 96      92        92       CO2 30 30      30        30       Creatinine 2.5 1.9      2.5        2.5       eGFR if  21 29      21        21       eGFR if non  18  Comment:  Calculation used to obtain the estimated glomerular filtration  rate (eGFR) is the CKD-EPI equation.    25  Comment:  Calculation used to obtain the estimated glomerular filtration  rate (eGFR) is the CKD-EPI equation.         18  Comment:  Calculation used to obtain the estimated glomerular filtration  rate (eGFR) is the CKD-EPI equation.           18  Comment:  Calculation used to obtain the estimated glomerular filtration  rate (eGFR) is the CKD-EPI equation.          Glucose 208 118      208        208       Magnesium 2.4       Phosphorus 3.0       Potassium 3.6 4.0      3.6        3.6       Sodium 133 138      133        133             Significant Imaging: I have reviewed all pertinent imaging results/findings within the past 24 hours.

## 2020-02-26 NOTE — PROGRESS NOTES
Pt due for HD in am Dr. Louie recommended to consult Dr. Benitez to stent fistula and increase BF. NP informed with orders

## 2020-02-26 NOTE — PT/OT/SLP PROGRESS
Physical Therapy      Patient Name:  Katie Quevedo   MRN:  [id number]    Patient not seen at this time secondary to pt having dialysis. Will follow-up later.    Danielle Linares, PT   2/26/2020

## 2020-02-26 NOTE — SUBJECTIVE & OBJECTIVE
Interval History: Full code. Continue current treatment.     Past Medical History:   Diagnosis Date    Acute congestive heart failure 2/10/2020    Anemia     Bilateral renal cysts     Cataract     Chronic LBP 7/26/2012    Chronic pain     CKD (chronic kidney disease), stage IV     COPD (chronic obstructive pulmonary disease)     Dehydration     Encounter for blood transfusion     HTN (hypertension)     Lumbar spondylosis     Melanoma     of the lip    Metabolic bone disease     Migraines, neuralgic     Osteoporosis     Primary osteoarthritis of both knees     s/p Rt TKA    Pulmonary embolism with infarction     Seizures 1972    x1 only    Subdeltoid bursitis, L>R. 9/27/2012    Ulcer     Vitamin D deficiency disease        Past Surgical History:   Procedure Laterality Date    BLADDER SUSPENSION      CATARACT EXTRACTION  11/18/13    left eye    CERVICAL LAMINECTOMY      x3, fusion x1    COLONOSCOPY  2009    HYSTERECTOMY      JOINT REPLACEMENT  2001    total right knee     LUMBAR LAMINECTOMY      x 3, fusion x1    OOPHORECTOMY      PLACEMENT OF ARTERIOVENOUS GRAFT Left 1/21/2020    Procedure: INSERTION, GRAFT, ARTERIOVENOUS;  Surgeon: Lindsey Louie MD;  Location: [facility] OR;  Service: General;  Laterality: Left;    THROMBECTOMY Left 2/3/2020    Procedure: THROMBECTOMY;  Surgeon: Lindsey Louie MD;  Location: [facility] OR;  Service: General;  Laterality: Left;       Review of patient's allergies indicates:   Allergen Reactions    Aspirin      Other reaction(s): hx of ulcers    Tetracycline Swelling     Other reaction(s): Swelling    Penicillins Rash     Other reaction(s): Hives  Other reaction(s): Rash  Other reaction(s): Rash  Other reaction(s): Hives       Medications:  Continuous Infusions:   naloxone (NARCAN) infusion Stopped (02/25/20 0836)     Scheduled Meds:   sodium chloride 0.9%   Intravenous Once    aztreonam  500 mg Intravenous Q8H    diphenhydrAMINE  50 mg Oral  Once    epoetin hemant-epbx  5,000 Units Intravenous Every Mon, Wed, Fri    heparin (porcine)  1,000 Units Intra-Catheter Once    [START ON 2/27/2020] paricalcitol  1 mcg Intravenous Q48H     PRN Meds:sodium chloride 0.9%, sodium chloride 0.9%, acetaminophen, albuterol-ipratropium, mupirocin, naloxone, ondansetron, sodium chloride 0.9%, Pharmacy to dose Vancomycin consult **AND** vancomycin - pharmacy to dose    Family History     Problem Relation (Age of Onset)    Arthritis Mother    Cancer Father    Cataracts Father, Sister    Diabetes Maternal Aunt    Glaucoma Cousin    Heart attack Maternal Grandfather    Heart disease Maternal Grandfather    Hypertension Father, Maternal Grandfather    Stroke Mother        Tobacco Use    Smoking status: Former Smoker     Packs/day: 1.00     Types: Cigarettes    Smokeless tobacco: Former User     Quit date: 2/3/2015   Substance and Sexual Activity    Alcohol use: Not Currently     Comment: Rare    Drug use: No    Sexual activity: Never     Partners: Male       Review of Systems   Respiratory: Negative for cough, chest tightness and shortness of breath.    Genitourinary: Negative for frequency and hematuria.   Musculoskeletal: Negative for back pain.   Neurological: Positive for weakness. Negative for light-headedness.   Psychiatric/Behavioral: Negative for confusion.     Objective:     Vital Signs (Most Recent):  Temp: 97.9 °F (36.6 °C) (02/26/20 0710)  Pulse: 96 (02/26/20 1015)  Resp: (!) 29 (02/26/20 1015)  BP: (!) 106/58 (02/26/20 1015)  SpO2: (!) 93 % (02/26/20 1015) Vital Signs (24h Range):  Temp:  [97.8 °F (36.6 °C)-98.6 °F (37 °C)] 97.9 °F (36.6 °C)  Pulse:  [] 96  Resp:  [24-52] 29  SpO2:  [88 %-99 %] 93 %  BP: ()/(38-83) 106/58     Weight: 58.9 kg (129 lb 13.6 oz)  Body mass index is 21.61 kg/m².    Review of Symptoms  Symptom Assessment (ESAS 0-10 scale)   ESAS 0 1 2 3 4 5 6 7 8 9 10   Pain              Dyspnea              Anxiety              Nausea               Depression               Anorexia              Fatigue              Insomnia              Restlessness               Agitation              CAM / Delirium __ --  ___+   Constipation     __ --  ___+   Diarrhea           __ --  ___+  Bowel Management Plan (BMP): Yes  Pain Assessment: yes    Performance Status: 50    ECOG Performance Status Grade: 3 - Confined to bed or chair 50% of waking hours    Physical Exam   Constitutional: She is oriented to person, place, and time. She appears well-developed and well-nourished.   HENT:   Head: Normocephalic and atraumatic.   Neck: Neck supple.   Cardiovascular: Normal rate, regular rhythm, normal heart sounds and intact distal pulses.   Pulmonary/Chest: Tachypnea noted. No respiratory distress. She has no wheezes. She has no rales.   Abdominal: Soft. Bowel sounds are normal. She exhibits no distension.   Musculoskeletal: Normal range of motion. She exhibits no edema or tenderness.   Neurological: She is alert and oriented to person, place, and time.   Skin: Skin is warm and dry. Capillary refill takes less than 2 seconds.   Psychiatric: She has a normal mood and affect.       Significant Labs: All pertinent labs within the past 24 hours have been reviewed.  CBC:   Recent Labs   Lab 02/24/20  0357 02/24/20  1641   WBC 7.86  --    HGB 9.0*  --    HCT 31.8* 32*   MCV 98  --      --      BMP:  Recent Labs   Lab 02/26/20  0427   *  208*  208*   *  133*  133*   K 3.6  3.6  3.6   CL 92*  92*  92*   CO2 30*  30*  30*   BUN 27*  27*  27*   CREATININE 2.5*  2.5*  2.5*   CALCIUM 10.0  10.0  10.0   MG 2.4     LFT:  Lab Results   Component Value Date    AST 22 02/23/2020    ALKPHOS 107 02/23/2020    BILITOT 0.3 02/23/2020     Albumin:   Albumin   Date Value Ref Range Status   02/23/2020 3.0 (L) 3.5 - 5.2 g/dL Final     Protein:   Total Protein   Date Value Ref Range Status   02/23/2020 6.7 6.0 - 8.4 g/dL Final     Lactic acid:   Lab Results    Component Value Date    LACTATE 2.2 02/23/2020    LACTATE 1.1 10/18/2017       Significant Imaging: I have reviewed all pertinent imaging results/findings within the past 24 hours.    Advance Care Planning   Advanced Directives::  Living Will: No  LaPOST: No  Do Not Resuscitate Status: No  Medical Power of : Yes.   Decision-Making Capacity: Patient answered questions, Family answered questions       Living Arrangements: Lives with spouse    Psychosocial/Cultural:  Patient's most important priorities:  Getting better    Patient's biggest concerns/fears:  none    Previous death/end of life care history:  none    Patient's goals/hopes:  none    Spiritual:     F- Aleja and Belief: none  I - Importance:none.  C - Community: none  A - Address in Care: none    Recommendations:  Continue medical treatment  Code status: Full   Ongoing disease education      Thank you for the consult and the opportunity to participate in care.       Sobia Taveras, MSN, APRN, NP-C   Palliative Medicine   [facility]  [phone] or [phone]    >50% of  60 min visit spent in chart review, face to face discussion of goals of care with patient, family, symptom assessment, coordination of care and emotional support.

## 2020-02-26 NOTE — ASSESSMENT & PLAN NOTE
US:  Inflow arterial velocity is 143 centimeters/second.  Proximal anastomosis velocity is 418 centimeters/second.  Proximal AVG velocity is 122 centimeters/second.  Mid AVG flow velocity is 139 centimeters/second, 790 mL per minute  Distal AVG velocity is 102 centimeters/second.  Distal anastomosis velocity is 365 centimeters/second.  Outflow vein velocity is 61.9 centimeters/second.  Axillary vein velocity is 37.3 centimeters/second.  Subclavian vein velocity is 56.5 centimeters/second.    Patient new to HD   AVG placed 01/21/2020  Venous HTN noted during HD but able to receive full runs  NPO p MN for fistulagram with Dr Benitez

## 2020-02-26 NOTE — ANESTHESIA PROCEDURE NOTES
Peripheral IV Insertion    Diagnosis: I99.8 Other disorder of circulatory system    Patient location during procedure: ICU  Procedure start time: 2/25/2020 8:47 PM  Timeout: 2/25/2020 7:47 PM  Procedure end time: 2/25/2020 7:49 PM    Staffing  Authorizing Provider: Dre Lorenzana MD  Performing Provider: Louie Medellin MD    Anesthesiologist was present at the time of the procedure.    Preanesthetic Checklist  Completed: patient identified, site marked, surgical consent, pre-op evaluation, timeout performed, IV checked, risks and benefits discussed, monitors and equipment checked and anesthesia consent givenPeripheral IV Insertion  Skin Prep: chlorhexidine gluconate  Local Infiltration: lidocaine  Orientation: left  Location: forearm  Catheter Size: 22 GInsertion Attempts: 1  Assessment  Dressing: secured with tape and tegaderm  Patient: Tolerated wellLine did not flush easily

## 2020-02-26 NOTE — EICU
Rounding (Video Assessment):  No    Intervention Initiated From:  Bedside    Francisco Communicated with Bedside Nurse regarding:  BP    Nurse Notified:  Yes    Doctor Notified:  Yes    Comments: eLert per bedside RN regarding BP with MAPs in low 60s, pt with no urine output, however, HD pt. Pt has non-functioning graft as HD access and one 22g PIV. Dr Hamliton with eICU notified. Awaiting orders, will continue to closely monitor.

## 2020-02-26 NOTE — HPI
"Katie Quevedo is a 75 y/o female with PMHx of COPD on 2 liters home oxygen at baseline, chronic pain, lumbar spondylosis, vitamin D deficiency dx, anemia, migraines, osteoarthritis, HTN,  PE with infarction, CKD stage IV, seizures, lumbar spondylosis, pulmonary embolism and acute CHF, who presents 2 hours history of AMS. Her  notes that she had eaten dinner well without any problems and later became unresponsive. Patient recently started on dialysis and has not dialyzed in 1 week since her dialysis access has not been functioning.  Patient was scheduled to have her access de-clotted 2/24 AM.  SBP 60-70s in ED. Requiring  narcan gtt. Taking norco 10 at home.    Palliative medicine met with patient. NAD. Spouse at bedside. Discussed GOC and disease education. Spouse would like for the patient to continue with current treatment at this time. "She looks better now than she did when I first brought her in." Patient and family wishes are to stay a full code at this time. Thanks for the consult.  "

## 2020-02-26 NOTE — PROGRESS NOTES
Pt Map dropped below 65 NP and eICU consulted. eICU placed orders on MAR for Albumin bolus and IV Abx. Pt tolerated well.

## 2020-02-26 NOTE — PLAN OF CARE
Problem: Physical Therapy Goal  Goal: Physical Therapy Goal  Description  Goals to be met by: 3/26/2020     Patient will increase functional independence with mobility by performin. Supine to sit with Modified [place]  2. Sit to supine with Modified [place]  3. Sit to stand transfer with Supervision using Rolling Walker  4. Bed to chair transfer with Supervision using Rolling Walker  5. Gait  x 50 feet with Supervision using Rolling Walker.   6. Ascend/Descend 4 inch curb step with Minimum Assistance using Rolling Walker.  7. Lower extremity exercise program x10 reps with independence      Outcome: Ongoing, Progressing  Patient evaluated; full report to follow; recommend home with HH PT/OT; no DME needs likely.

## 2020-02-26 NOTE — H&P (VIEW-ONLY)
[facility]  Cardiology  Consult Note    Patient Name: Katie Quevedo  MRN: [id number]  Admission Date: 2/23/2020  Hospital Length of Stay: 2 days  Code Status: Full Code   Attending Provider: Jennifer Bowles*   Consulting Provider: Prasad Nicole NP  Primary Care Physician: Kingston Verduzco MD  Principal Problem:Opioid overdose    Patient information was obtained from spouse/SO, past medical records and ER records.     Inpatient consult to [facility]  Consult performed by: Prasad Nicole NP  Consult ordered by: Kandy Man NP        Subjective:     Chief Complaint:  AVG     HPI:   HPI retrieved from chart and spouse at bedside. Patient lethargic.   Katie Quevedo is a 75 y/o female with PMHx of COPD on 2 liters home oxygen at baseline, chronic pain, lumbar spondylosis, vitamin D deficiency dx, anemia, migraines, osteoarthritis, HTN,  PE with infarction, CKD stage IV, seizures, lumbar spondylosis, pulmonary embolism and acute CHF. Patient presented to the ER with  AMS. Her  notes that she had eaten dinner well without any problems and later became unresponsive. Patient recently started on dialysis and has not dialyzed in 1 week since her dialysis access has not been functioning.  Patient was scheduled to have her access de-clotted 2/24 AM.  Patient was evaluated by Dr Louie who noted functioning AVG, mild venous hypertension,  Recommend consult  for fistulogram and ballooning     Past Medical History:   Diagnosis Date    Acute congestive heart failure 2/10/2020    Anemia     Bilateral renal cysts     Cataract     Chronic LBP 7/26/2012    Chronic pain     CKD (chronic kidney disease), stage IV     COPD (chronic obstructive pulmonary disease)     Dehydration     Encounter for blood transfusion     HTN (hypertension)     Lumbar spondylosis     Melanoma     of the lip    Metabolic bone disease     Migraines, neuralgic     Osteoporosis     Primary  osteoarthritis of both knees     s/p Rt TKA    Pulmonary embolism with infarction     Seizures 1972    x1 only    Subdeltoid bursitis, L>R. 9/27/2012    Ulcer     Vitamin D deficiency disease        Past Surgical History:   Procedure Laterality Date    BLADDER SUSPENSION      CATARACT EXTRACTION  11/18/13    left eye    CERVICAL LAMINECTOMY      x3, fusion x1    COLONOSCOPY  2009    HYSTERECTOMY      JOINT REPLACEMENT  2001    total right knee     LUMBAR LAMINECTOMY      x 3, fusion x1    OOPHORECTOMY      PLACEMENT OF ARTERIOVENOUS GRAFT Left 1/21/2020    Procedure: INSERTION, GRAFT, ARTERIOVENOUS;  Surgeon: Lindsey Louie MD;  Location: [facility] OR;  Service: General;  Laterality: Left;    THROMBECTOMY Left 2/3/2020    Procedure: THROMBECTOMY;  Surgeon: Lindsey Louie MD;  Location: [facility] OR;  Service: General;  Laterality: Left;       Review of patient's allergies indicates:   Allergen Reactions    Aspirin      Other reaction(s): hx of ulcers    Tetracycline Swelling     Other reaction(s): Swelling    Penicillins Rash     Other reaction(s): Hives  Other reaction(s): Rash  Other reaction(s): Rash  Other reaction(s): Hives       No current facility-administered medications on file prior to encounter.      Current Outpatient Medications on File Prior to Encounter   Medication Sig    albuterol-ipratropium (DUO-NEB) 2.5 mg-0.5 mg/3 mL nebulizer solution Take 3 mLs by nebulization every 6 (six) hours as needed for Shortness of Breath. Rescue    allopurinoL (ZYLOPRIM) 100 MG tablet Take 1 tablet (100 mg total) by mouth on Tuesday, Thursday, Saturday, and Sunday.    amLODIPine (NORVASC) 2.5 MG tablet Take 2.5 mg by mouth once daily.     busPIRone (BUSPAR) 15 MG tablet Take 1 tablet (15 mg total) by mouth 2 (two) times daily.    clonazePAM (KLONOPIN) 1 MG tablet Take 1 tablet (1 mg total) by mouth 2 (two) times daily as needed for Anxiety.    cyproheptadine (PERIACTIN) 4 mg tablet Take 1  tablet (4 mg total) by mouth every 8 (eight) hours.    diphenhydrAMINE (BENADRYL) 25 mg capsule Take 25 mg by mouth every 6 (six) hours as needed for Itching.    ergocalciferol (ERGOCALCIFEROL) 50,000 unit Cap Take 1 capsule by mouth once weekly for 10 weeks, then take 1 capsule by mouth once monthly.    fluticasone-umeclidin-vilanter (TRELEGY ELLIPTA) 100-62.5-25 mcg DsDv Inhale 1 puff by mouth into the lungs once daily.    furosemide (LASIX) 80 MG tablet Take 1 tablet (80 mg total) by mouth twice daily on non dialysis days Tuesday, Thursday, Saturday, and Sunday.    furosemide (LASIX) 80 MG tablet Take 1 tablet (80 mg total) by mouth 2 (two) times daily.    HYDROcodone-acetaminophen (NORCO)  mg per tablet Take 1 tablet by mouth 3 (three) times daily as needed.    levoFLOXacin (LEVAQUIN) 750 MG tablet Take 1 tablet (750 mg total) by mouth once daily.    lidocaine-prilocaine (EMLA) cream Apply topically to left arm prior to dialysis.    mupirocin (BACTROBAN) 2 % ointment apply by Nasal route 2 (two) times daily.    ondansetron (ZOFRAN-ODT) 4 MG TbDL Dissolve one tablet under the tongue every 6 (six) hours as needed.    oseltamivir (TAMIFLU) 30 MG capsule Take 1 capsule (30 mg total) by mouth once daily. for 4 days    potassium chloride SA (K-DUR,KLOR-CON) 20 MEQ tablet Take 2 tablets (40 mEq total) by mouth once daily.    sodium bicarbonate 650 MG tablet Take 1 tablet (650 mg total) by mouth 2 (two) times daily.    traZODone (DESYREL) 100 MG tablet Take 100 mg by mouth nightly as needed for Insomnia.    zolpidem (AMBIEN) 5 MG Tab Take 5 mg by mouth every evening.      Family History     Problem Relation (Age of Onset)    Arthritis Mother    Cancer Father    Cataracts Father, Sister    Diabetes Maternal Aunt    Glaucoma Cousin    Heart attack Maternal Grandfather    Heart disease Maternal Grandfather    Hypertension Father, Maternal Grandfather    Stroke Mother        Tobacco Use    Smoking  status: Former Smoker     Packs/day: 1.00     Types: Cigarettes    Smokeless tobacco: Former User     Quit date: 2/3/2015   Substance and Sexual Activity    Alcohol use: Not Currently     Comment: Rare    Drug use: No    Sexual activity: Never     Partners: Male     Review of Systems   Unable to perform ROS: acuity of condition     Objective:     Vital Signs (Most Recent):  Temp: 97.9 °F (36.6 °C) (02/26/20 1000)  Pulse: 104 (02/26/20 1130)  Resp: (!) 27 (02/26/20 1130)  BP: (!) 120/56 (02/26/20 1130)  SpO2: (!) 92 % (02/26/20 1130) Vital Signs (24h Range):  Temp:  [97.8 °F (36.6 °C)-98.6 °F (37 °C)] 97.9 °F (36.6 °C)  Pulse:  [] 104  Resp:  [24-52] 27  SpO2:  [88 %-99 %] 92 %  BP: ()/(38-83) 120/56     Weight: 58.9 kg (129 lb 13.6 oz)  Body mass index is 21.61 kg/m².    SpO2: (!) 92 %  O2 Device (Oxygen Therapy): Vapotherm      Intake/Output Summary (Last 24 hours) at 2/26/2020 1150  Last data filed at 2/26/2020 0600  Gross per 24 hour   Intake 1165 ml   Output 550 ml   Net 615 ml       Lines/Drains/Airways     Drain                 Hemodialysis AV Graft Left upper arm -- days    Female External Urinary Catheter 02/25/20 1415 less than 1 day          Airway                 Airway - Non-Surgical 02/03/20 1252 Nasal Cannula 22 days          Peripheral Intravenous Line                 Peripheral IV - Single Lumen 02/25/20 2009 22 G Anterior;Right Wrist less than 1 day                Physical Exam   Constitutional: No distress.   HENT:   Head: Atraumatic.   Eyes: Right eye exhibits no discharge. Left eye exhibits no discharge.   Neck: No JVD present.   Cardiovascular: Normal rate, regular rhythm and normal heart sounds. Exam reveals no gallop and no friction rub.   No murmur heard.  Pulmonary/Chest: Effort normal. She has decreased breath sounds. She has no rales.   Abdominal: Soft. Bowel sounds are normal.   Musculoskeletal: She exhibits no edema.   Skin: Skin is warm and dry. She is not diaphoretic.        Significant Labs:   CMP   Recent Labs   Lab 02/25/20  1047 02/26/20  0427    133*  133*  133*   K 4.0 3.6  3.6  3.6   CL 96 92*  92*  92*   CO2 30* 30*  30*  30*   * 208*  208*  208*   BUN 23 27*  27*  27*   CREATININE 1.9* 2.5*  2.5*  2.5*   CALCIUM 10.7* 10.0  10.0  10.0   ANIONGAP 12 11  11  11   ESTGFRAFRICA 29* 21*  21*  21*   EGFRNONAA 25* 18*  18*  18*   , CBC   Recent Labs   Lab 02/24/20  1641   HCT 32*   , INR No results for input(s): INR, PROTIME in the last 48 hours., Lipid Panel No results for input(s): CHOL, HDL, LDLCALC, TRIG, CHOLHDL in the last 48 hours., Troponin No results for input(s): TROPONINI in the last 48 hours. and All pertinent lab results from the last 24 hours have been reviewed.    Significant Imaging: Echocardiogram:   Transthoracic echo (TTE) complete (Cupid Only):   Results for orders placed or performed during the hospital encounter of 09/13/19   Echo Color Flow Doppler? Yes; Bubble Contrast? No   Result Value Ref Range    BSA 1.64 m2    LA WIDTH 2.30 cm    AORTIC VALVE CUSP SEPERATION 1.67 cm    PV PEAK VELOCITY 0.96 cm/s    LVIDD 3.48 (A) 3.5 - 6.0 cm    IVS 0.42 (A) 0.6 - 1.1 cm    PW 0.61 0.6 - 1.1 cm    Ao root annulus 2.19 cm    LVIDS 2.28 2.1 - 4.0 cm    FS 34 28 - 44 %    LA volume 36.54 cm3    LV mass 41.86 g    LA size 4.04 cm    Left Ventricle Relative Wall Thickness 0.35 cm    AV mean gradient 5 mmHg    AV Velocity Ratio 0.92     AV index (prosthetic) 1.09     E/A ratio 2.21     E wave decelartion time 112.59 msec    IVRT 0.07 msec    LVOT peak landon 1.40 m/s    LVOT peak VTI 30.41 cm    Ao peak landon 1.52 m/s    Ao VTI 27.98 cm    AV peak gradient 9 mmHg    MV Peak E Landon 1.17 m/s    MV Peak A Landon 0.53 m/s    LV Systolic Volume 17.67 mL    LV Systolic Volume Index 11.2 mL/m2    LV Diastolic Volume 50.14 mL    LV Diastolic Volume Index 31.68 mL/m2    LA Volume Index 23.1 mL/m2    LV Mass Index 26 g/m2    RA Major Axis 3.75 cm    Left  Atrium Minor Axis 4.94 cm    Left Atrium Major Axis 4.35 cm    Right Atrial Pressure (from IVC) 8 mmHg    Narrative    · Normal left ventricular systolic function. The estimated ejection   fraction is 55%  · Grade II (moderate) left ventricular diastolic dysfunction consistent   with pseudonormalization.  · Elevated left atrial pressure.  · No wall motion abnormalities.  · Normal right ventricular systolic function.  · No TR Jet to calculate PA pressure  · Intermediate central venous pressure (8 mm Hg).  · There is a large left pleural effusion.        Assessment and Plan:     ESRD (end stage renal disease) on dialysis    US:  Inflow arterial velocity is 143 centimeters/second.  Proximal anastomosis velocity is 418 centimeters/second.  Proximal AVG velocity is 122 centimeters/second.  Mid AVG flow velocity is 139 centimeters/second, 790 mL per minute  Distal AVG velocity is 102 centimeters/second.  Distal anastomosis velocity is 365 centimeters/second.  Outflow vein velocity is 61.9 centimeters/second.  Axillary vein velocity is 37.3 centimeters/second.  Subclavian vein velocity is 56.5 centimeters/second.    Patient new to HD   AVG placed 01/21/2020  Venous HTN noted during HD but able to receive full runs  NPO p MN for fistulagram with Dr Benitez         VTE Risk Mitigation (From admission, onward)         Ordered     IP VTE HIGH RISK PATIENT  Once      02/24/20 0343     Place sequential compression device  Until discontinued      02/24/20 0343     Place DAWN hose  Until discontinued      02/24/20 0056                Thank you for your consult. I will follow-up with patient. Please contact us if you have any additional questions.    Prasad Nicole NP  Cardiology   [facility]

## 2020-02-26 NOTE — EICU
eICU Note :    Called by the [facility] eRN:    Problem: Called about Hypotension in a Renal Pt , no urinary output, , BP 58, 63 systolic , CXR fro 02/23 shows LLL infiltrate     Pertinent History and labs reviewed :  75 y/o f  Problem List:  2020-02: Acute encephalopathy  2020-02: Hyperkalemia  2020-02: Encephalopathy, metabolic  2020-02: Opioid overdose  2020-02: ESRD (end stage renal disease) on dialysis  2020-02: Hyponatremia  2020-02: Acute congestive heart failure  2020-02: Diastolic dysfunction, left ventricle  2020-02: Thrombosis of renal dialysis arteriovenous graft  2020-02: Clotted renal dialysis AV graft  2020-01: Non-intractable vomiting with nausea  Melanoma  Chronic pain  Vitamin deficiency  Primary osteoarthritis of both knees  Kidney disease, chronic, stage III (GFR 30-59 ml/min)  COPD exacerbation  Hypoxia  Pleural effusion, left  Anemia, chronic renal failure, stage 4 (severe)      Treatment /Intervention given:Albumin 5% 250 cc ×1,Stat chest x-ray to assess left lower lobe infiltrate.  Empiric IV antibiotic as patient was recently treated for left lower lobe pneumonia        Divine King M.D  eICU Physician

## 2020-02-26 NOTE — CONSULTS
"[facility]  Palliative Medicine  Consult Note    Patient Name: Katie Quevedo  MRN: [id number]  Admission Date: 2/23/2020  Hospital Length of Stay: 2 days  Code Status: Full Code   Attending Provider: Jennifer Bowles*  Consulting Provider: Sobia Taveras NP  Primary Care Physician: Kingston Verduzco MD  Principal Problem:Opioid overdose    Patient information was obtained from relative(s), past medical records and ER records.      Inpatient consult to Palliative Care  Consult performed by: Sobia Taveras NP  Consult ordered by: Jennifer Bowles MD        Assessment/Plan:     No new Assessment & Plan notes have been filed under this hospital service since the last note was generated.  Service: Palliative Medicine      Thank you for your consult. I will sign off. Please contact us if you have any additional questions.    Subjective:     HPI:   Katie Quevedo is a 73 y/o female with PMHx of COPD on 2 liters home oxygen at baseline, chronic pain, lumbar spondylosis, vitamin D deficiency dx, anemia, migraines, osteoarthritis, HTN,  PE with infarction, CKD stage IV, seizures, lumbar spondylosis, pulmonary embolism and acute CHF, who presents 2 hours history of AMS. Her  notes that she had eaten dinner well without any problems and later became unresponsive. Patient recently started on dialysis and has not dialyzed in 1 week since her dialysis access has not been functioning.  Patient was scheduled to have her access de-clotted 2/24 AM.  SBP 60-70s in ED. Requiring  narcan gtt. Taking norco 10 at home.    Palliative medicine met with patient. NAD. Spouse at bedside. Discussed GOC and disease education. Spouse would like for the patient to continue with current treatment at this time. "She looks better now than she did when I first brought her in." Patient and family wishes are to stay a full code at this time. Thanks for the consult.    Hospital Course:  No notes on file    Interval " History: Full code. Continue current treatment.     Past Medical History:   Diagnosis Date    Acute congestive heart failure 2/10/2020    Anemia     Bilateral renal cysts     Cataract     Chronic LBP 7/26/2012    Chronic pain     CKD (chronic kidney disease), stage IV     COPD (chronic obstructive pulmonary disease)     Dehydration     Encounter for blood transfusion     HTN (hypertension)     Lumbar spondylosis     Melanoma     of the lip    Metabolic bone disease     Migraines, neuralgic     Osteoporosis     Primary osteoarthritis of both knees     s/p Rt TKA    Pulmonary embolism with infarction     Seizures 1972    x1 only    Subdeltoid bursitis, L>R. 9/27/2012    Ulcer     Vitamin D deficiency disease        Past Surgical History:   Procedure Laterality Date    BLADDER SUSPENSION      CATARACT EXTRACTION  11/18/13    left eye    CERVICAL LAMINECTOMY      x3, fusion x1    COLONOSCOPY  2009    HYSTERECTOMY      JOINT REPLACEMENT  2001    total right knee     LUMBAR LAMINECTOMY      x 3, fusion x1    OOPHORECTOMY      PLACEMENT OF ARTERIOVENOUS GRAFT Left 1/21/2020    Procedure: INSERTION, GRAFT, ARTERIOVENOUS;  Surgeon: Lindsey Louie MD;  Location: [facility] OR;  Service: General;  Laterality: Left;    THROMBECTOMY Left 2/3/2020    Procedure: THROMBECTOMY;  Surgeon: Lindsey Louie MD;  Location: [facility] OR;  Service: General;  Laterality: Left;       Review of patient's allergies indicates:   Allergen Reactions    Aspirin      Other reaction(s): hx of ulcers    Tetracycline Swelling     Other reaction(s): Swelling    Penicillins Rash     Other reaction(s): Hives  Other reaction(s): Rash  Other reaction(s): Rash  Other reaction(s): Hives       Medications:  Continuous Infusions:   naloxone (NARCAN) infusion Stopped (02/25/20 0836)     Scheduled Meds:   sodium chloride 0.9%   Intravenous Once    aztreonam  500 mg Intravenous Q8H    diphenhydrAMINE  50 mg Oral Once     epoetin hemant-epbx  5,000 Units Intravenous Every Mon, Wed, Fri    heparin (porcine)  1,000 Units Intra-Catheter Once    [START ON 2/27/2020] paricalcitol  1 mcg Intravenous Q48H     PRN Meds:sodium chloride 0.9%, sodium chloride 0.9%, acetaminophen, albuterol-ipratropium, mupirocin, naloxone, ondansetron, sodium chloride 0.9%, Pharmacy to dose Vancomycin consult **AND** vancomycin - pharmacy to dose    Family History     Problem Relation (Age of Onset)    Arthritis Mother    Cancer Father    Cataracts Father, Sister    Diabetes Maternal Aunt    Glaucoma Cousin    Heart attack Maternal Grandfather    Heart disease Maternal Grandfather    Hypertension Father, Maternal Grandfather    Stroke Mother        Tobacco Use    Smoking status: Former Smoker     Packs/day: 1.00     Types: Cigarettes    Smokeless tobacco: Former User     Quit date: 2/3/2015   Substance and Sexual Activity    Alcohol use: Not Currently     Comment: Rare    Drug use: No    Sexual activity: Never     Partners: Male       Review of Systems   Respiratory: Negative for cough, chest tightness and shortness of breath.    Genitourinary: Negative for frequency and hematuria.   Musculoskeletal: Negative for back pain.   Neurological: Positive for weakness. Negative for light-headedness.   Psychiatric/Behavioral: Negative for confusion.     Objective:     Vital Signs (Most Recent):  Temp: 97.9 °F (36.6 °C) (02/26/20 0710)  Pulse: 96 (02/26/20 1015)  Resp: (!) 29 (02/26/20 1015)  BP: (!) 106/58 (02/26/20 1015)  SpO2: (!) 93 % (02/26/20 1015) Vital Signs (24h Range):  Temp:  [97.8 °F (36.6 °C)-98.6 °F (37 °C)] 97.9 °F (36.6 °C)  Pulse:  [] 96  Resp:  [24-52] 29  SpO2:  [88 %-99 %] 93 %  BP: ()/(38-83) 106/58     Weight: 58.9 kg (129 lb 13.6 oz)  Body mass index is 21.61 kg/m².    Review of Symptoms  Symptom Assessment (ESAS 0-10 scale)   ESAS 0 1 2 3 4 5 6 7 8 9 10   Pain              Dyspnea              Anxiety              Nausea               Depression               Anorexia              Fatigue              Insomnia              Restlessness               Agitation              CAM / Delirium __ --  ___+   Constipation     __ --  ___+   Diarrhea           __ --  ___+  Bowel Management Plan (BMP): Yes  Pain Assessment: yes    Performance Status: 50    ECOG Performance Status Grade: 3 - Confined to bed or chair 50% of waking hours    Physical Exam   Constitutional: She is oriented to person, place, and time. She appears well-developed and well-nourished.   HENT:   Head: Normocephalic and atraumatic.   Neck: Neck supple.   Cardiovascular: Normal rate, regular rhythm, normal heart sounds and intact distal pulses.   Pulmonary/Chest: Tachypnea noted. No respiratory distress. She has no wheezes. She has no rales.   Abdominal: Soft. Bowel sounds are normal. She exhibits no distension.   Musculoskeletal: Normal range of motion. She exhibits no edema or tenderness.   Neurological: She is alert and oriented to person, place, and time.   Skin: Skin is warm and dry. Capillary refill takes less than 2 seconds.   Psychiatric: She has a normal mood and affect.       Significant Labs: All pertinent labs within the past 24 hours have been reviewed.  CBC:   Recent Labs   Lab 02/24/20  0357 02/24/20  1641   WBC 7.86  --    HGB 9.0*  --    HCT 31.8* 32*   MCV 98  --      --      BMP:  Recent Labs   Lab 02/26/20  0427   *  208*  208*   *  133*  133*   K 3.6  3.6  3.6   CL 92*  92*  92*   CO2 30*  30*  30*   BUN 27*  27*  27*   CREATININE 2.5*  2.5*  2.5*   CALCIUM 10.0  10.0  10.0   MG 2.4     LFT:  Lab Results   Component Value Date    AST 22 02/23/2020    ALKPHOS 107 02/23/2020    BILITOT 0.3 02/23/2020     Albumin:   Albumin   Date Value Ref Range Status   02/23/2020 3.0 (L) 3.5 - 5.2 g/dL Final     Protein:   Total Protein   Date Value Ref Range Status   02/23/2020 6.7 6.0 - 8.4 g/dL Final     Lactic acid:   Lab Results    Component Value Date    LACTATE 2.2 02/23/2020    LACTATE 1.1 10/18/2017       Significant Imaging: I have reviewed all pertinent imaging results/findings within the past 24 hours.    Advance Care Planning   Advanced Directives::  Living Will: No  LaPOST: No  Do Not Resuscitate Status: No  Medical Power of : Yes.   Decision-Making Capacity: Patient answered questions, Family answered questions       Living Arrangements: Lives with spouse    Psychosocial/Cultural:  Patient's most important priorities:  Getting better    Patient's biggest concerns/fears:  none    Previous death/end of life care history:  none    Patient's goals/hopes:  none    Spiritual:     F- Aleja and Belief: none  I - Importance:none.  C - Community: none  A - Address in Care: none    Recommendations:  Continue medical treatment  Code status: Full   Ongoing disease education      Thank you for the consult and the opportunity to participate in care.       Sobia Taveras, MSN, APRN, NP-C   Palliative Medicine   [facility]  [phone] or [phone]    >50% of  60 min visit spent in chart review, face to face discussion of goals of care with patient, family, symptom assessment, coordination of care and emotional support.

## 2020-02-26 NOTE — PLAN OF CARE
Notified Dr Diggs. Pt was bladder scanned last night with 225 ml and in-and-out cath with 250 ml of urine at 0300. Pt is currently on HD.     Dr gibbs. Ordered to encourage pt to void. Will continue to monitor.

## 2020-02-26 NOTE — PT/OT/SLP EVAL
Physical Therapy Evaluation    Patient Name:  Katie Quevedo   MRN:  [id number]    Recommendations:     Discharge Recommendations:  home health PT, home health OT   Discharge Equipment Recommendations: (none)   Barriers to discharge: None    Assessment:     Katie Quevedo is a 74 y.o. female admitted with a medical diagnosis of Opioid overdose.  She presents with the following impairments/functional limitations:  weakness, impaired endurance, gait instability, impaired functional mobilty, impaired self care skills, decreased upper extremity function, decreased lower extremity function, impaired cardiopulmonary response to activity .    Rehab Prognosis: Good; patient would benefit from acute skilled PT services to address these deficits and reach maximum level of function.    Recent Surgery: Procedure(s) (LRB):  THROMBECTOMY (Left) 2 Days Post-Op    Plan:     During this hospitalization, patient to be seen 5 x/week to address the identified rehab impairments via gait training, therapeutic activities, therapeutic exercises and progress toward the following goals:    · Plan of Care Expires:  03/26/20    Subjective     Chief Complaint: LBP, itchiness on back  Patient/Family Comments/goals: return to PLOF  Pain/Comfort:  · Pain Rating 1: 5/10  · Location - Side 1: Bilateral  · Location - Orientation 1: lower  · Location 1: back  · Pain Addressed 1: Reposition, Distraction, Cessation of Activity(warm packs to back prior)  · Pain Rating Post-Intervention 1: (did not rate)    Patients cultural, spiritual, Congregational conflicts given the current situation: no    Living Environment:  Pt lives with spouse [facility] [place], walk in tub  Previous level of function: Mod I for functional mobility, Mod I to min A for ADLs; limited endurance and tolerance for activity 2/2 SOB per pt and spouse report  Roles and Routines: Caretaker to self, drives occasionally. Light meal prep occasionally but pt reports that it is difficult for her to hold items and  walk with them 2/2 requiring use of AD for gait stability. Min A for waistband management for LE dressing, does not currently complete grocery shopping but is interested in going shopping as form of therapeutic activity  Equipment Used at Home:  nebulizer, oxygen, power chair, cane, straight, rollator, grab bar  Assistance upon Discharge: Family    Objective:     Communicated with nurse prior to session.  Patient found with blood pressure cuff, pulse ox (continuous), peripheral IV, oxygen, SCD, telemetry(vapotherm 20 L /min at 45% O2)  upon PT entry to room.    General Precautions: Standard, fall   Orthopedic Precautions:N/A   Braces: N/A     Exams:  · Cognitive Exam:  Patient is oriented to Person, Place, Time, Situation and multistep commands  · Gross Motor Coordination:  WFL, mildly tremulous initially, resolved by end of session  · Postural Exam:  Patient presented with the following abnormalities:    · -       Rounded shoulders  · -       Forward head  · Skin Integrity/Edema:      · -       Skin integrity: Visible skin intact  · RLE ROM: WFL  · RLE Strength: hip~3+ to 4-; knee~4- to 4; ankle 3+ to 4- grossly  · LLE ROM: WFL  · LLE Strength: hip~3+ to 4-; knee~4; ankle~3+ to 4-    Functional Mobility:  · Bed Mobility:     · Rolling Left:  supervision  · Scooting: supervision  · Supine to Sit: supervision  · Transfers:     · Sit to Stand:  stand by assistance with rolling walker  · Bed to Chair: stand by assistance with  rolling walker  using  Step Transfer  · Gait: Pt amb 5ft forward and 5ft backward with SBA/CGA with minimally increased forward trunk flexion, fair step length, slow charito.  · Balance: sit static~good, dynamic sit~fair+, stand static~fair+, dynamic stand~fair to fair+      Therapeutic Activities and Exercises:   Patient/ educated on role of PT/POC; pt transitioned to EOB; /59  O2 sats 99%; pt performed BLE seated APs, FAQ, hip fl x 10 ea; sit to stand at RW and amb as  described above; pt sat in bedside chair; BP 99/48  O2 sats 99%; elevated LEs    AM-PAC 6 CLICK MOBILITY  Total Score:17     Patient left up in chair with all lines intact, call button in reach, nurse notified and  present.    GOALS:   Multidisciplinary Problems     Physical Therapy Goals        Problem: Physical Therapy Goal    Goal Priority Disciplines Outcome Goal Variances Interventions   Physical Therapy Goal     PT, PT/OT Ongoing, Progressing     Description:  Goals to be met by: 3/26/2020     Patient will increase functional independence with mobility by performin. Supine to sit with Modified [place]  2. Sit to supine with Modified [place]  3. Sit to stand transfer with Supervision using Rolling Walker  4. Bed to chair transfer with Supervision using Rolling Walker  5. Gait  x 50 feet with Supervision using Rolling Walker.   6. Ascend/Descend 4 inch curb step with Minimum Assistance using Rolling Walker.  7. Lower extremity exercise program x10 reps with independence                       History:     Past Medical History:   Diagnosis Date    Acute congestive heart failure 2/10/2020    Anemia     Bilateral renal cysts     Cataract     Chronic LBP 2012    Chronic pain     CKD (chronic kidney disease), stage IV     COPD (chronic obstructive pulmonary disease)     Dehydration     Encounter for blood transfusion     HTN (hypertension)     Lumbar spondylosis     Melanoma     of the lip    Metabolic bone disease     Migraines, neuralgic     Osteoporosis     Primary osteoarthritis of both knees     s/p Rt TKA    Pulmonary embolism with infarction     Seizures 1972    x1 only    Subdeltoid bursitis, L>R. 2012    Ulcer     Vitamin D deficiency disease        Past Surgical History:   Procedure Laterality Date    BLADDER SUSPENSION      CATARACT EXTRACTION  13    left eye    CERVICAL LAMINECTOMY      x3, fusion x1    COLONOSCOPY       HYSTERECTOMY      JOINT REPLACEMENT  2001    total right knee     LUMBAR LAMINECTOMY      x 3, fusion x1    OOPHORECTOMY      PLACEMENT OF ARTERIOVENOUS GRAFT Left 1/21/2020    Procedure: INSERTION, GRAFT, ARTERIOVENOUS;  Surgeon: Lindsey Louie MD;  Location: [facility] OR;  Service: General;  Laterality: Left;    THROMBECTOMY Left 2/3/2020    Procedure: THROMBECTOMY;  Surgeon: Lindsey Louie MD;  Location: [facility] OR;  Service: General;  Laterality: Left;       Time Tracking:     PT Received On: 02/26/20  PT Start Time: 1424     PT Stop Time: 1445  PT Total Time (min): 21 min with OT    Billable Minutes: Evaluation 21 minutes      Danielle Linares, PT  02/26/2020

## 2020-02-26 NOTE — PROGRESS NOTES
Pt IV on assessment RFA line infiltrated. Attempt x1 unable to stick. Pt requested Anesthesia to stick. Anesthesia got line in RW 22g, Dr. Holcomb requested to inform staff to check line before surgery and to call if need a new line, veins are very fragile, may not last.

## 2020-02-26 NOTE — HPI
HPI retrieved from chart and spouse at bedside. Patient lethargic.   Katie Quevedo is a 73 y/o female with PMHx of COPD on 2 liters home oxygen at baseline, chronic pain, lumbar spondylosis, vitamin D deficiency dx, anemia, migraines, osteoarthritis, HTN,  PE with infarction, CKD stage IV, seizures, lumbar spondylosis, pulmonary embolism and acute CHF. Patient presented to the ER with  AMS. Her  notes that she had eaten dinner well without any problems and later became unresponsive. Patient recently started on dialysis and has not dialyzed in 1 week since her dialysis access has not been functioning.  Patient was scheduled to have her access de-clotted 2/24 AM.  Patient was evaluated by Dr Louie who noted functioning AVG, mild venous hypertension,  Recommend consult  for fistulogram and ballooning

## 2020-02-26 NOTE — PLAN OF CARE
Problem: Occupational Therapy Goal  Goal: Occupational Therapy Goal  Description  Goals to be met by: 03/26/2020     Patient will increase functional independence with ADLs by performing:    LE Dressing with Stand-by Assistance.  Grooming while standing with Supervision.  Toileting from bedside commode with Supervision for hygiene and clothing management.   Step transfer with Supervision  Toilet transfer to toilet with Supervision.  Increased functional strength to WFL for self care.  Upper extremity exercise program x10 reps per handout, with supervision.     Outcome: Ongoing, Progressing   Pt would benefit from continued OT to address deficits in self care and functional mobility. Recommending HHOT/PT; DME needs likely none

## 2020-02-26 NOTE — CONSULTS
[facility]  Cardiology  Consult Note    Patient Name: Katie Quevedo  MRN: [id number]  Admission Date: 2/23/2020  Hospital Length of Stay: 2 days  Code Status: Full Code   Attending Provider: Jennifer Bowles*   Consulting Provider: Prasad Nicole NP  Primary Care Physician: Kingston Verduzco MD  Principal Problem:Opioid overdose    Patient information was obtained from spouse/SO, past medical records and ER records.     Inpatient consult to [facility]  Consult performed by: Prasad Nicole NP  Consult ordered by: Kandy Man NP        Subjective:     Chief Complaint:  AVG     HPI:   HPI retrieved from chart and spouse at bedside. Patient lethargic.   Katie Quevedo is a 75 y/o female with PMHx of COPD on 2 liters home oxygen at baseline, chronic pain, lumbar spondylosis, vitamin D deficiency dx, anemia, migraines, osteoarthritis, HTN,  PE with infarction, CKD stage IV, seizures, lumbar spondylosis, pulmonary embolism and acute CHF. Patient presented to the ER with  AMS. Her  notes that she had eaten dinner well without any problems and later became unresponsive. Patient recently started on dialysis and has not dialyzed in 1 week since her dialysis access has not been functioning.  Patient was scheduled to have her access de-clotted 2/24 AM.  Patient was evaluated by Dr Louie who noted functioning AVG, mild venous hypertension,  Recommend consult  for fistulogram and ballooning     Past Medical History:   Diagnosis Date    Acute congestive heart failure 2/10/2020    Anemia     Bilateral renal cysts     Cataract     Chronic LBP 7/26/2012    Chronic pain     CKD (chronic kidney disease), stage IV     COPD (chronic obstructive pulmonary disease)     Dehydration     Encounter for blood transfusion     HTN (hypertension)     Lumbar spondylosis     Melanoma     of the lip    Metabolic bone disease     Migraines, neuralgic     Osteoporosis     Primary  osteoarthritis of both knees     s/p Rt TKA    Pulmonary embolism with infarction     Seizures 1972    x1 only    Subdeltoid bursitis, L>R. 9/27/2012    Ulcer     Vitamin D deficiency disease        Past Surgical History:   Procedure Laterality Date    BLADDER SUSPENSION      CATARACT EXTRACTION  11/18/13    left eye    CERVICAL LAMINECTOMY      x3, fusion x1    COLONOSCOPY  2009    HYSTERECTOMY      JOINT REPLACEMENT  2001    total right knee     LUMBAR LAMINECTOMY      x 3, fusion x1    OOPHORECTOMY      PLACEMENT OF ARTERIOVENOUS GRAFT Left 1/21/2020    Procedure: INSERTION, GRAFT, ARTERIOVENOUS;  Surgeon: Lindsey Louie MD;  Location: [facility] OR;  Service: General;  Laterality: Left;    THROMBECTOMY Left 2/3/2020    Procedure: THROMBECTOMY;  Surgeon: Lindsey Louie MD;  Location: [facility] OR;  Service: General;  Laterality: Left;       Review of patient's allergies indicates:   Allergen Reactions    Aspirin      Other reaction(s): hx of ulcers    Tetracycline Swelling     Other reaction(s): Swelling    Penicillins Rash     Other reaction(s): Hives  Other reaction(s): Rash  Other reaction(s): Rash  Other reaction(s): Hives       No current facility-administered medications on file prior to encounter.      Current Outpatient Medications on File Prior to Encounter   Medication Sig    albuterol-ipratropium (DUO-NEB) 2.5 mg-0.5 mg/3 mL nebulizer solution Take 3 mLs by nebulization every 6 (six) hours as needed for Shortness of Breath. Rescue    allopurinoL (ZYLOPRIM) 100 MG tablet Take 1 tablet (100 mg total) by mouth on Tuesday, Thursday, Saturday, and Sunday.    amLODIPine (NORVASC) 2.5 MG tablet Take 2.5 mg by mouth once daily.     busPIRone (BUSPAR) 15 MG tablet Take 1 tablet (15 mg total) by mouth 2 (two) times daily.    clonazePAM (KLONOPIN) 1 MG tablet Take 1 tablet (1 mg total) by mouth 2 (two) times daily as needed for Anxiety.    cyproheptadine (PERIACTIN) 4 mg tablet Take 1  tablet (4 mg total) by mouth every 8 (eight) hours.    diphenhydrAMINE (BENADRYL) 25 mg capsule Take 25 mg by mouth every 6 (six) hours as needed for Itching.    ergocalciferol (ERGOCALCIFEROL) 50,000 unit Cap Take 1 capsule by mouth once weekly for 10 weeks, then take 1 capsule by mouth once monthly.    fluticasone-umeclidin-vilanter (TRELEGY ELLIPTA) 100-62.5-25 mcg DsDv Inhale 1 puff by mouth into the lungs once daily.    furosemide (LASIX) 80 MG tablet Take 1 tablet (80 mg total) by mouth twice daily on non dialysis days Tuesday, Thursday, Saturday, and Sunday.    furosemide (LASIX) 80 MG tablet Take 1 tablet (80 mg total) by mouth 2 (two) times daily.    HYDROcodone-acetaminophen (NORCO)  mg per tablet Take 1 tablet by mouth 3 (three) times daily as needed.    levoFLOXacin (LEVAQUIN) 750 MG tablet Take 1 tablet (750 mg total) by mouth once daily.    lidocaine-prilocaine (EMLA) cream Apply topically to left arm prior to dialysis.    mupirocin (BACTROBAN) 2 % ointment apply by Nasal route 2 (two) times daily.    ondansetron (ZOFRAN-ODT) 4 MG TbDL Dissolve one tablet under the tongue every 6 (six) hours as needed.    oseltamivir (TAMIFLU) 30 MG capsule Take 1 capsule (30 mg total) by mouth once daily. for 4 days    potassium chloride SA (K-DUR,KLOR-CON) 20 MEQ tablet Take 2 tablets (40 mEq total) by mouth once daily.    sodium bicarbonate 650 MG tablet Take 1 tablet (650 mg total) by mouth 2 (two) times daily.    traZODone (DESYREL) 100 MG tablet Take 100 mg by mouth nightly as needed for Insomnia.    zolpidem (AMBIEN) 5 MG Tab Take 5 mg by mouth every evening.      Family History     Problem Relation (Age of Onset)    Arthritis Mother    Cancer Father    Cataracts Father, Sister    Diabetes Maternal Aunt    Glaucoma Cousin    Heart attack Maternal Grandfather    Heart disease Maternal Grandfather    Hypertension Father, Maternal Grandfather    Stroke Mother        Tobacco Use    Smoking  status: Former Smoker     Packs/day: 1.00     Types: Cigarettes    Smokeless tobacco: Former User     Quit date: 2/3/2015   Substance and Sexual Activity    Alcohol use: Not Currently     Comment: Rare    Drug use: No    Sexual activity: Never     Partners: Male     Review of Systems   Unable to perform ROS: acuity of condition     Objective:     Vital Signs (Most Recent):  Temp: 97.9 °F (36.6 °C) (02/26/20 1000)  Pulse: 104 (02/26/20 1130)  Resp: (!) 27 (02/26/20 1130)  BP: (!) 120/56 (02/26/20 1130)  SpO2: (!) 92 % (02/26/20 1130) Vital Signs (24h Range):  Temp:  [97.8 °F (36.6 °C)-98.6 °F (37 °C)] 97.9 °F (36.6 °C)  Pulse:  [] 104  Resp:  [24-52] 27  SpO2:  [88 %-99 %] 92 %  BP: ()/(38-83) 120/56     Weight: 58.9 kg (129 lb 13.6 oz)  Body mass index is 21.61 kg/m².    SpO2: (!) 92 %  O2 Device (Oxygen Therapy): Vapotherm      Intake/Output Summary (Last 24 hours) at 2/26/2020 1150  Last data filed at 2/26/2020 0600  Gross per 24 hour   Intake 1165 ml   Output 550 ml   Net 615 ml       Lines/Drains/Airways     Drain                 Hemodialysis AV Graft Left upper arm -- days    Female External Urinary Catheter 02/25/20 1415 less than 1 day          Airway                 Airway - Non-Surgical 02/03/20 1252 Nasal Cannula 22 days          Peripheral Intravenous Line                 Peripheral IV - Single Lumen 02/25/20 2009 22 G Anterior;Right Wrist less than 1 day                Physical Exam   Constitutional: No distress.   HENT:   Head: Atraumatic.   Eyes: Right eye exhibits no discharge. Left eye exhibits no discharge.   Neck: No JVD present.   Cardiovascular: Normal rate, regular rhythm and normal heart sounds. Exam reveals no gallop and no friction rub.   No murmur heard.  Pulmonary/Chest: Effort normal. She has decreased breath sounds. She has no rales.   Abdominal: Soft. Bowel sounds are normal.   Musculoskeletal: She exhibits no edema.   Skin: Skin is warm and dry. She is not diaphoretic.        Significant Labs:   CMP   Recent Labs   Lab 02/25/20  1047 02/26/20  0427    133*  133*  133*   K 4.0 3.6  3.6  3.6   CL 96 92*  92*  92*   CO2 30* 30*  30*  30*   * 208*  208*  208*   BUN 23 27*  27*  27*   CREATININE 1.9* 2.5*  2.5*  2.5*   CALCIUM 10.7* 10.0  10.0  10.0   ANIONGAP 12 11  11  11   ESTGFRAFRICA 29* 21*  21*  21*   EGFRNONAA 25* 18*  18*  18*   , CBC   Recent Labs   Lab 02/24/20  1641   HCT 32*   , INR No results for input(s): INR, PROTIME in the last 48 hours., Lipid Panel No results for input(s): CHOL, HDL, LDLCALC, TRIG, CHOLHDL in the last 48 hours., Troponin No results for input(s): TROPONINI in the last 48 hours. and All pertinent lab results from the last 24 hours have been reviewed.    Significant Imaging: Echocardiogram:   Transthoracic echo (TTE) complete (Cupid Only):   Results for orders placed or performed during the hospital encounter of 09/13/19   Echo Color Flow Doppler? Yes; Bubble Contrast? No   Result Value Ref Range    BSA 1.64 m2    LA WIDTH 2.30 cm    AORTIC VALVE CUSP SEPERATION 1.67 cm    PV PEAK VELOCITY 0.96 cm/s    LVIDD 3.48 (A) 3.5 - 6.0 cm    IVS 0.42 (A) 0.6 - 1.1 cm    PW 0.61 0.6 - 1.1 cm    Ao root annulus 2.19 cm    LVIDS 2.28 2.1 - 4.0 cm    FS 34 28 - 44 %    LA volume 36.54 cm3    LV mass 41.86 g    LA size 4.04 cm    Left Ventricle Relative Wall Thickness 0.35 cm    AV mean gradient 5 mmHg    AV Velocity Ratio 0.92     AV index (prosthetic) 1.09     E/A ratio 2.21     E wave decelartion time 112.59 msec    IVRT 0.07 msec    LVOT peak landon 1.40 m/s    LVOT peak VTI 30.41 cm    Ao peak landon 1.52 m/s    Ao VTI 27.98 cm    AV peak gradient 9 mmHg    MV Peak E Landon 1.17 m/s    MV Peak A Landon 0.53 m/s    LV Systolic Volume 17.67 mL    LV Systolic Volume Index 11.2 mL/m2    LV Diastolic Volume 50.14 mL    LV Diastolic Volume Index 31.68 mL/m2    LA Volume Index 23.1 mL/m2    LV Mass Index 26 g/m2    RA Major Axis 3.75 cm    Left  Atrium Minor Axis 4.94 cm    Left Atrium Major Axis 4.35 cm    Right Atrial Pressure (from IVC) 8 mmHg    Narrative    · Normal left ventricular systolic function. The estimated ejection   fraction is 55%  · Grade II (moderate) left ventricular diastolic dysfunction consistent   with pseudonormalization.  · Elevated left atrial pressure.  · No wall motion abnormalities.  · Normal right ventricular systolic function.  · No TR Jet to calculate PA pressure  · Intermediate central venous pressure (8 mm Hg).  · There is a large left pleural effusion.        Assessment and Plan:     ESRD (end stage renal disease) on dialysis    US:  Inflow arterial velocity is 143 centimeters/second.  Proximal anastomosis velocity is 418 centimeters/second.  Proximal AVG velocity is 122 centimeters/second.  Mid AVG flow velocity is 139 centimeters/second, 790 mL per minute  Distal AVG velocity is 102 centimeters/second.  Distal anastomosis velocity is 365 centimeters/second.  Outflow vein velocity is 61.9 centimeters/second.  Axillary vein velocity is 37.3 centimeters/second.  Subclavian vein velocity is 56.5 centimeters/second.    Patient new to HD   AVG placed 01/21/2020  Venous HTN noted during HD but able to receive full runs  NPO p MN for fistulagram with Dr Benitez         VTE Risk Mitigation (From admission, onward)         Ordered     IP VTE HIGH RISK PATIENT  Once      02/24/20 0343     Place sequential compression device  Until discontinued      02/24/20 0343     Place DAWN hose  Until discontinued      02/24/20 0056                Thank you for your consult. I will follow-up with patient. Please contact us if you have any additional questions.    Prasad Nicole NP  Cardiology   [facility]

## 2020-02-26 NOTE — PROGRESS NOTES
Pharmacokinetic Initial Assessment: IV Vancomycin    Assessment/Plan:    Initiate intravenous vancomycin with loading dose of 1250 mg once with subsequent doses when random concentrations are less than 20 mcg/mL  Desired empiric serum trough concentration is 15 to 20 mcg/mL  Draw vancomycin random level on 2/27/2020 at 0400.  Pharmacy will continue to follow and monitor vancomycin.      Please contact pharmacy at extension [phone] with any questions regarding this assessment.     Thank you for the consult,   Dev Riddle       Patient brief summary:  Katie Quevedo is a 74 y.o. female initiated on antimicrobial therapy with IV Vancomycin for treatment of suspected sepsis.     Drug Allergies:   Review of patient's allergies indicates:   Allergen Reactions    Aspirin      Other reaction(s): hx of ulcers    Tetracycline Swelling     Other reaction(s): Swelling    Penicillins Rash     Other reaction(s): Hives  Other reaction(s): Rash  Other reaction(s): Rash  Other reaction(s): Hives       Actual Body Weight:   58.9 kg    Renal Function:   Estimated Creatinine Clearance: 22.7 mL/min (A) (based on SCr of 1.9 mg/dL (H)).,     Dialysis Method (if applicable):  intermittent HD    CBC (last 72 hours):  Recent Labs   Lab Result Units 02/23/20  2312 02/24/20  0357   WBC K/uL 6.67 7.86   Hemoglobin g/dL 7.9* 9.0*   Hematocrit % 27.1* 31.8*   Platelets K/uL 112* 156   Gran% % 52.8 66.8   Lymph% % 21.6 11.5*   Mono% % 15.1* 15.5*   Eosinophil% % 9.1* 4.6   Basophil% % 1.0 0.8   Differential Method  Automated Automated       Metabolic Panel (last 72 hours):  Recent Labs   Lab Result Units 02/23/20  2312 02/24/20  0038 02/24/20  0357 02/24/20  0818 02/25/20  0417 02/25/20  1047   Sodium mmol/L 137  --  140 134*  --  138   Potassium mmol/L 6.2*  --  5.3* 6.2*  --  4.0   Chloride mmol/L 97  --  99 97  --  96   CO2 mmol/L 32*  --  29 28  --  30*   Glucose mg/dL 86  --  68* 148*  --  118*   Glucose, UA   --  Negative  --   --   --   --     BUN, Bld mg/dL 46*  --  46* 45*  --  23   Creatinine mg/dL 3.8*  --  3.6* 3.4*  --  1.9*   Creatinine, Random Ur mg/dL  --  89.2  --   --   --   --    Albumin g/dL 3.0*  --   --   --   --   --    Total Bilirubin mg/dL 0.3  --   --   --   --   --    Alkaline Phosphatase U/L 107  --   --   --   --   --    AST U/L 22  --   --   --   --   --    ALT U/L 9*  --   --   --   --   --    Magnesium mg/dL 2.1  --  2.1  --  1.6  --    Phosphorus mg/dL  --   --  4.7*  --  3.4  --        Drug levels (last 3 results):  No results for input(s): VANCOMYCINRA, VANCOMYCINPE, VANCOMYCINTR in the last 72 hours.    Microbiologic Results:  Microbiology Results (last 7 days)       Procedure Component Value Units Date/Time    Blood culture #1 **CANNOT BE ORDERED STAT** [[id number]] Collected:  02/23/20 2312    Order Status:  Completed Specimen:  Blood from Peripheral, Antecubital, Right Updated:  02/25/20 0613     Blood Culture, Routine No Growth to date      No Growth to date    Blood culture #2 **CANNOT BE ORDERED STAT** [[id number]] Collected:  02/23/20 2319    Order Status:  Completed Specimen:  Blood from Peripheral, Forearm, Right Updated:  02/25/20 0613     Blood Culture, Routine No Growth to date      No Growth to date

## 2020-02-27 PROBLEM — Z71.89 COUNSELING REGARDING ADVANCE CARE PLANNING AND GOALS OF CARE: Status: RESOLVED | Noted: 2020-02-26 | Resolved: 2020-02-27

## 2020-02-27 PROBLEM — E87.5 HYPERKALEMIA: Status: RESOLVED | Noted: 2020-02-24 | Resolved: 2020-02-27

## 2020-02-27 PROBLEM — Z71.89 GOALS OF CARE, COUNSELING/DISCUSSION: Status: RESOLVED | Noted: 2020-02-26 | Resolved: 2020-02-27

## 2020-02-27 PROBLEM — Z51.5 PALLIATIVE CARE ENCOUNTER: Status: RESOLVED | Noted: 2020-02-26 | Resolved: 2020-02-27

## 2020-02-27 PROBLEM — G93.41 ENCEPHALOPATHY, METABOLIC: Status: RESOLVED | Noted: 2020-02-24 | Resolved: 2020-02-27

## 2020-02-27 LAB
ANION GAP SERPL CALC-SCNC: 9 MMOL/L (ref 8–16)
BASOPHILS # BLD AUTO: 0.09 K/UL (ref 0–0.2)
BASOPHILS NFR BLD: 1 % (ref 0–1.9)
BUN SERPL-MCNC: 22 MG/DL (ref 8–23)
CALCIUM SERPL-MCNC: 9.8 MG/DL (ref 8.7–10.5)
CHLORIDE SERPL-SCNC: 102 MMOL/L (ref 95–110)
CO2 SERPL-SCNC: 24 MMOL/L (ref 23–29)
CREAT SERPL-MCNC: 1.8 MG/DL (ref 0.5–1.4)
DIFFERENTIAL METHOD: ABNORMAL
EOSINOPHIL # BLD AUTO: 0.6 K/UL (ref 0–0.5)
EOSINOPHIL NFR BLD: 6.8 % (ref 0–8)
ERYTHROCYTE [DISTWIDTH] IN BLOOD BY AUTOMATED COUNT: 17.3 % (ref 11.5–14.5)
EST. GFR  (AFRICAN AMERICAN): 31 ML/MIN/1.73 M^2
EST. GFR  (NON AFRICAN AMERICAN): 27 ML/MIN/1.73 M^2
GLUCOSE SERPL-MCNC: 106 MG/DL (ref 70–110)
HCT VFR BLD AUTO: 33 % (ref 37–48.5)
HGB BLD-MCNC: 10.1 G/DL (ref 12–16)
IMM GRANULOCYTES # BLD AUTO: 0.03 K/UL (ref 0–0.04)
IMM GRANULOCYTES NFR BLD AUTO: 0.3 % (ref 0–0.5)
LYMPHOCYTES # BLD AUTO: 1.7 K/UL (ref 1–4.8)
LYMPHOCYTES NFR BLD: 17.9 % (ref 18–48)
MAGNESIUM SERPL-MCNC: 2.1 MG/DL (ref 1.6–2.6)
MCH RBC QN AUTO: 27.9 PG (ref 27–31)
MCHC RBC AUTO-ENTMCNC: 30.6 G/DL (ref 32–36)
MCV RBC AUTO: 91 FL (ref 82–98)
MONOCYTES # BLD AUTO: 1.1 K/UL (ref 0.3–1)
MONOCYTES NFR BLD: 12.2 % (ref 4–15)
NEUTROPHILS # BLD AUTO: 5.7 K/UL (ref 1.8–7.7)
NEUTROPHILS NFR BLD: 61.8 % (ref 38–73)
NRBC BLD-RTO: 0 /100 WBC
PHOSPHATE SERPL-MCNC: 3 MG/DL (ref 2.7–4.5)
PLATELET # BLD AUTO: 233 K/UL (ref 150–350)
PMV BLD AUTO: 9.6 FL (ref 9.2–12.9)
POCT GLUCOSE: 98 MG/DL (ref 70–110)
POTASSIUM SERPL-SCNC: 3.4 MMOL/L (ref 3.5–5.1)
POTASSIUM SERPL-SCNC: 3.6 MMOL/L (ref 3.5–5.1)
RBC # BLD AUTO: 3.62 M/UL (ref 4–5.4)
SODIUM SERPL-SCNC: 135 MMOL/L (ref 136–145)
VANCOMYCIN SERPL-MCNC: 14.7 UG/ML
WBC # BLD AUTO: 9.27 K/UL (ref 3.9–12.7)

## 2020-02-27 PROCEDURE — 63600175 PHARM REV CODE 636 W HCPCS: Mod: HCNC | Performed by: INTERNAL MEDICINE

## 2020-02-27 PROCEDURE — 94640 AIRWAY INHALATION TREATMENT: CPT | Mod: HCNC

## 2020-02-27 PROCEDURE — C1769 GUIDE WIRE: HCPCS | Mod: HCNC | Performed by: INTERNAL MEDICINE

## 2020-02-27 PROCEDURE — 80202 ASSAY OF VANCOMYCIN: CPT | Mod: HCNC

## 2020-02-27 PROCEDURE — S0073 INJECTION, AZTREONAM, 500 MG: HCPCS | Mod: HCNC | Performed by: INTERNAL MEDICINE

## 2020-02-27 PROCEDURE — C1894 INTRO/SHEATH, NON-LASER: HCPCS | Mod: HCNC | Performed by: INTERNAL MEDICINE

## 2020-02-27 PROCEDURE — 80048 BASIC METABOLIC PNL TOTAL CA: CPT | Mod: HCNC

## 2020-02-27 PROCEDURE — 99153 MOD SED SAME PHYS/QHP EA: CPT | Mod: HCNC | Performed by: INTERNAL MEDICINE

## 2020-02-27 PROCEDURE — 83735 ASSAY OF MAGNESIUM: CPT | Mod: HCNC

## 2020-02-27 PROCEDURE — 84100 ASSAY OF PHOSPHORUS: CPT | Mod: HCNC

## 2020-02-27 PROCEDURE — 25000242 PHARM REV CODE 250 ALT 637 W/ HCPCS: Mod: HCNC | Performed by: FAMILY MEDICINE

## 2020-02-27 PROCEDURE — 36902 PR INTRO CATH, DIALYSIS CIRCUIT W/TRANSLML BALLOON ANGIO: ICD-10-PCS | Mod: HCNC,,, | Performed by: INTERNAL MEDICINE

## 2020-02-27 PROCEDURE — 25000003 PHARM REV CODE 250: Mod: HCNC | Performed by: NURSE PRACTITIONER

## 2020-02-27 PROCEDURE — 99900035 HC TECH TIME PER 15 MIN (STAT): Mod: HCNC

## 2020-02-27 PROCEDURE — C1725 CATH, TRANSLUMIN NON-LASER: HCPCS | Mod: HCNC | Performed by: INTERNAL MEDICINE

## 2020-02-27 PROCEDURE — 36415 COLL VENOUS BLD VENIPUNCTURE: CPT | Mod: HCNC

## 2020-02-27 PROCEDURE — 84132 ASSAY OF SERUM POTASSIUM: CPT | Mod: HCNC

## 2020-02-27 PROCEDURE — 85025 COMPLETE CBC W/AUTO DIFF WBC: CPT | Mod: HCNC

## 2020-02-27 PROCEDURE — 99152 MOD SED SAME PHYS/QHP 5/>YRS: CPT | Mod: HCNC | Performed by: INTERNAL MEDICINE

## 2020-02-27 PROCEDURE — 36902 INTRO CATH DIALYSIS CIRCUIT: CPT | Mod: HCNC,,, | Performed by: INTERNAL MEDICINE

## 2020-02-27 PROCEDURE — 25000003 PHARM REV CODE 250: Mod: HCNC | Performed by: INTERNAL MEDICINE

## 2020-02-27 PROCEDURE — 25500020 PHARM REV CODE 255: Mod: HCNC | Performed by: INTERNAL MEDICINE

## 2020-02-27 PROCEDURE — 11000001 HC ACUTE MED/SURG PRIVATE ROOM: Mod: HCNC

## 2020-02-27 PROCEDURE — 27201423 OPTIME MED/SURG SUP & DEVICES STERILE SUPPLY: Mod: HCNC | Performed by: INTERNAL MEDICINE

## 2020-02-27 PROCEDURE — 36902 INTRO CATH DIALYSIS CIRCUIT: CPT | Mod: HCNC | Performed by: INTERNAL MEDICINE

## 2020-02-27 PROCEDURE — 94761 N-INVAS EAR/PLS OXIMETRY MLT: CPT | Mod: HCNC

## 2020-02-27 PROCEDURE — 63600175 PHARM REV CODE 636 W HCPCS: Mod: HCNC | Performed by: HOSPITALIST

## 2020-02-27 PROCEDURE — 99152 MOD SED SAME PHYS/QHP 5/>YRS: CPT | Mod: HCNC,,, | Performed by: INTERNAL MEDICINE

## 2020-02-27 PROCEDURE — 27100171 HC OXYGEN HIGH FLOW UP TO 24 HOURS: Mod: HCNC

## 2020-02-27 PROCEDURE — 99152 PR MOD CONSCIOUS SEDATION, SAME PHYS, 5+ YRS, FIRST 15 MIN: ICD-10-PCS | Mod: HCNC,,, | Performed by: INTERNAL MEDICINE

## 2020-02-27 RX ORDER — IODIXANOL 320 MG/ML
INJECTION, SOLUTION INTRAVASCULAR
Status: DISCONTINUED | OUTPATIENT
Start: 2020-02-27 | End: 2020-02-27 | Stop reason: HOSPADM

## 2020-02-27 RX ORDER — FENTANYL CITRATE 50 UG/ML
INJECTION, SOLUTION INTRAMUSCULAR; INTRAVENOUS
Status: DISCONTINUED | OUTPATIENT
Start: 2020-02-27 | End: 2020-02-27 | Stop reason: HOSPADM

## 2020-02-27 RX ORDER — HEPARIN SODIUM 1000 [USP'U]/ML
INJECTION, SOLUTION INTRAVENOUS; SUBCUTANEOUS
Status: DISCONTINUED | OUTPATIENT
Start: 2020-02-27 | End: 2020-02-27 | Stop reason: HOSPADM

## 2020-02-27 RX ORDER — AMLODIPINE BESYLATE 2.5 MG/1
2.5 TABLET ORAL DAILY
Status: DISCONTINUED | OUTPATIENT
Start: 2020-02-28 | End: 2020-02-29 | Stop reason: HOSPADM

## 2020-02-27 RX ORDER — HEPARIN SODIUM 200 [USP'U]/100ML
INJECTION, SOLUTION INTRAVENOUS
Status: DISCONTINUED | OUTPATIENT
Start: 2020-02-27 | End: 2020-02-29 | Stop reason: HOSPADM

## 2020-02-27 RX ORDER — DIPHENHYDRAMINE HYDROCHLORIDE 50 MG/ML
INJECTION INTRAMUSCULAR; INTRAVENOUS
Status: DISCONTINUED | OUTPATIENT
Start: 2020-02-27 | End: 2020-02-27 | Stop reason: HOSPADM

## 2020-02-27 RX ORDER — VERAPAMIL HYDROCHLORIDE 2.5 MG/ML
INJECTION, SOLUTION INTRAVENOUS
Status: DISCONTINUED | OUTPATIENT
Start: 2020-02-27 | End: 2020-02-27 | Stop reason: HOSPADM

## 2020-02-27 RX ORDER — MIDAZOLAM HYDROCHLORIDE 1 MG/ML
INJECTION INTRAMUSCULAR; INTRAVENOUS
Status: DISCONTINUED | OUTPATIENT
Start: 2020-02-27 | End: 2020-02-27 | Stop reason: HOSPADM

## 2020-02-27 RX ADMIN — VANCOMYCIN HYDROCHLORIDE 500 MG: 500 INJECTION, POWDER, LYOPHILIZED, FOR SOLUTION INTRAVENOUS at 10:02

## 2020-02-27 RX ADMIN — AZTREONAM 500 MG: 1 INJECTION, POWDER, LYOPHILIZED, FOR SOLUTION INTRAMUSCULAR; INTRAVENOUS at 08:02

## 2020-02-27 RX ADMIN — AZTREONAM 500 MG: 1 INJECTION, POWDER, LYOPHILIZED, FOR SOLUTION INTRAMUSCULAR; INTRAVENOUS at 12:02

## 2020-02-27 RX ADMIN — ACETAMINOPHEN 650 MG: 325 TABLET ORAL at 07:02

## 2020-02-27 RX ADMIN — IPRATROPIUM BROMIDE AND ALBUTEROL SULFATE 3 ML: .5; 3 SOLUTION RESPIRATORY (INHALATION) at 08:02

## 2020-02-27 RX ADMIN — ACETAMINOPHEN 650 MG: 325 TABLET ORAL at 02:02

## 2020-02-27 NOTE — PROGRESS NOTES
[facility] Hospital Medicine  Progress Note    Patient Name: Katie Quevedo  MRN: [id number]  Patient Class: IP- Inpatient   Admission Date: 2/23/2020  Length of Stay: 3 days  Attending Physician: Ramos Navarro DO  Primary Care Provider: Kingston Verduzco MD        Subjective:     Principal Problem:Opioid overdose        HPI:  Katie Quevedo is a 73 y/o female with PMHx of COPD on 2 liters home oxygen at baseline, chronic pain, lumbar spondylosis, vitamin D deficiency dx, anemia, migraines, osteoarthritis, HTN,  PE with infarction, CKD stage IV, seizures, lumbar spondylosis, pulmonary embolism and acute CHF, who presents 2 hours history of AMS. Her  notes that she had eaten dinner well without any problems and later became unresponsive. Patient recently started on dialysis and has not dialyzed in 1 week since her dialysis access has not been functioning.  Patient was scheduled to have her access de-clotted 2/24 AM.  SBP 60-70s in ED. Requiring  narcan gtt. Taking norco 10 at home     Overview/Hospital Course:  2/27 pt had an fistulagram today by cardiology, treat elevated BP with PRN hydralazine. F/u with nephrology     Interval History: s/p fistulogram today, replace electrolytes, f/u with nephro. tx BP    Review of Systems   Constitutional: Negative for chills, diaphoresis and fatigue.   Cardiovascular: Negative for chest pain.   Neurological: Negative for dizziness.     Objective:     Vital Signs (Most Recent):  Temp: 97.1 °F (36.2 °C) (02/27/20 1619)  Pulse: 101 (02/27/20 1619)  Resp: 18 (02/27/20 1619)  BP: (!) 105/57 (02/27/20 1619)  SpO2: 100 % (02/27/20 1619) Vital Signs (24h Range):  Temp:  [96.7 °F (35.9 °C)-99 °F (37.2 °C)] 97.1 °F (36.2 °C)  Pulse:  [] 101  Resp:  [18-33] 18  SpO2:  [94 %-100 %] 100 %  BP: (102-136)/(52-87) 105/57     Weight: 50.7 kg (111 lb 12.4 oz)  Body mass index is 18.6 kg/m².    Intake/Output Summary (Last 24 hours) at 2/27/2020 6528  Last data filed at  2/27/2020 0313  Gross per 24 hour   Intake 510 ml   Output 150 ml   Net 360 ml      Physical Exam   Constitutional: She appears well-developed and well-nourished.   HENT:   Head: Atraumatic.   Eyes: EOM are normal.   Pulmonary/Chest: Breath sounds normal. No respiratory distress.   Abdominal: Soft.   Neurological: She is alert.   Psychiatric: She has a normal mood and affect.   Nursing note and vitals reviewed.      Significant Labs:   Recent Labs   Lab 02/23/20  2312 02/24/20  0357 02/24/20  1641 02/27/20  1139   WBC 6.67  --  7.86  --  9.27   HGB 7.9*  --  9.0*  --  10.1*   HCT 27.1*   < > 31.8* 32* 33.0*   *  --  156  --  233    < > = values in this interval not displayed.     Recent Labs   Lab 02/23/20 2312 02/25/20  0417 02/25/20  1047 02/26/20  0427 02/27/20  0604 02/27/20  1139      < >  --  138 133*  133*  133*  --  135*   K 6.2*   < >  --  4.0 3.6  3.6  3.6 3.6 3.4*   CL 97   < >  --  96 92*  92*  92*  --  102   CO2 32*   < >  --  30* 30*  30*  30*  --  24   BUN 46*   < >  --  23 27*  27*  27*  --  22   CREATININE 3.8*   < >  --  1.9* 2.5*  2.5*  2.5*  --  1.8*   GLU 86   < >  --  118* 208*  208*  208*  --  106   CALCIUM 9.3   < >  --  10.7* 10.0  10.0  10.0  --  9.8   MG 2.1   < > 1.6  --  2.4 2.1  --    PHOS  --    < > 3.4  --  3.0 3.0  --    LIPASE 25  --   --   --   --   --   --     < > = values in this interval not displayed.     Recent Labs   Lab 02/23/20 2312   ALKPHOS 107   ALT 9*   AST 22   ALBUMIN 3.0*   PROT 6.7   BILITOT 0.3   INR 1.1      No results for input(s): CPK, CPKMB, MB, TROPONINI in the last 72 hours.  Recent Labs   Lab 02/20/20  1944 02/23/20  2302 02/24/20  0111 02/24/20  1641 02/26/20  2139 02/27/20  1207   POCTGLUCOSE 123* 123* 207* 104 120* 98     Hemoglobin A1C   Date Value Ref Range Status   01/22/2018 5.2 4.0 - 5.6 % Final     Comment:     According to ADA guidelines, hemoglobin A1c <7.0% represents  optimal control in non-pregnant diabetic  patients. Different  metrics may apply to specific patient populations.   Standards of Medical Care in Diabetes-2016.  For the purpose of screening for the presence of diabetes:  <5.7%     Consistent with the absence of diabetes  5.7-6.4%  Consistent with increasing risk for diabetes   (prediabetes)  >or=6.5%  Consistent with diabetes  Currently, no consensus exists for use of hemoglobin A1c  for diagnosis of diabetes for children.  This Hemoglobin A1c assay has significant interference with fetal   hemoglobin   (HbF). The results are invalid for patients with abnormal amounts of   HbF,   including those with known Hereditary Persistence   of Fetal Hemoglobin. Heterozygous hemoglobin variants (HbAS, HbAC,   HbAD, HbAE, HbA2) do not significantly interfere with this assay;   however, presence of multiple variants in a sample may impact the %   interference.     05/14/2012 4.7 4.0 - 6.2 % Final     Scheduled Meds:   aztreonam  500 mg Intravenous Q8H    epoetin hemant-epbx  5,000 Units Intravenous Every Mon, Wed, Fri    paricalcitol  1 mcg Intravenous Q48H     Continuous Infusions:   heparin (porcine)      naloxone (NARCAN) infusion Stopped (02/25/20 0836)     As Needed: sodium chloride 0.9%, sodium chloride 0.9%, acetaminophen, albuterol-ipratropium, heparin (porcine), naloxone, ondansetron, sodium chloride 0.9%, Pharmacy to dose Vancomycin consult **AND** vancomycin - pharmacy to dose    Significant Imaging:   No new imaging          Assessment/Plan:      * Opioid overdose  2/27 counseling on reducing opioids      Essential hypertension  Hold Norvasc  Monitor  2/27 PRN hydralazine    Acute encephalopathy  Opioid overdose  Chronic neck pain  Chronic pain  ESRD (end stage renal disease) on dialysis  Hyperkalemia  Taking norco 10 at home   Requiring  narcan gtt.   Patient with non function dialysis catheter planned for de-clotting today   Consult nephrology  Avoid nephrology toxic agents  renally dose  medications  Consult surgery for graft declotting today    ESRD (end stage renal disease) on dialysis  2/27 S/P fistulogram repair today        Chronic respiratory failure with hypoxia, on home O2 therapy  COPD (chronic obstructive pulmonary disease)  2/27 Continue supplemental O2, prn nebs         VTE Risk Mitigation (From admission, onward)         Ordered     heparin infusion 1,000 units/500 ml in 0.9% NaCl (pressure line flush)  Intra-op continuous PRN      02/27/20 1236     IP VTE HIGH RISK PATIENT  Once      02/24/20 0343     Place sequential compression device  Until discontinued      02/24/20 0343     Place DAWN hose  Until discontinued      02/24/20 0056                      Ramos Navarro DO  Department of Hospital Medicine   [facility]

## 2020-02-27 NOTE — PLAN OF CARE
Patient transferred to floor room 428 on cardiac monitor.  VSS. No c/o pain.   Patient attached to tele monitor upon arrival in room.  Left radial gauze/tegaderm CDI. No bleeding, no shadowing, no swelling noted.    Left upper arm fistula gauze/tegaderm CD. No bleeding, no shadowing, no swelling noted.  +bruit + thrill.   Sites reviewed with DANIEL Souza at bedside. +2 Bilat radial and pedal pulses. All questions answered. Updated pt's  at bedside.

## 2020-02-27 NOTE — HOSPITAL COURSE
2/27 pt had an fistulagram today by cardiology, treat elevated BP with PRN hydralazine. F/u with nephrology   2/28 the HD fistula is functioning well, pt wanted to go home but complaining of continuous diarrhea, we will check c diff and once resulted will make decision to dc pt home  C diff came bACK NEGATIVE AND PT IS ASKING TO BE DC HOME TONIGHT

## 2020-02-27 NOTE — PLAN OF CARE
Pt stable. NO distress noted. POC reviewed with pt. Pt verbalized understanding. Pt remains SR-ST low 100s on the monitor. NO true red alarms noted. PO Tylenol given for chronic back pain. Pt remains on 2L 40% sating 98%. IV placed by Anesthesia in RFA. IV abx administered. Pt clipped and placed NPO for Fitsulogram. Fall precautions maintained. Bed in lowest position, call light in reach and bed alarm on.

## 2020-02-27 NOTE — PT/OT/SLP PROGRESS
Physical Therapy      Patient Name:  Katie Quevedo   MRN:  [id number]    Patient not seen today secondary to pt declined therapy 2/2 going to sx-nurse reports the pt is going for a fistulogram then to dialysis. Will follow-up likely tomorrow.    Danielle Linares, PT   2/27/2020

## 2020-02-27 NOTE — NURSING TRANSFER
Nursing Transfer Note      2/26/2020     Transfer To: Room 428    Transfer via bed    Transfer with  to O2, cardiac monitoring    Transported by RN and transporter    Medicines sent: N/A    Chart send with patient: Yes    Notified: spouse    Patient reassessed at: 2/26 at 1800    Upon arrival to floor: cardiac monitor applied, patient oriented to room, call bell in reach and bed in lowest position

## 2020-02-27 NOTE — SUBJECTIVE & OBJECTIVE
Interval History: s/p fistulogram today, replace electrolytes, f/u with nephro. tx BP    Review of Systems   Constitutional: Negative for chills, diaphoresis and fatigue.   Cardiovascular: Negative for chest pain.   Neurological: Negative for dizziness.     Objective:     Vital Signs (Most Recent):  Temp: 97.1 °F (36.2 °C) (02/27/20 1619)  Pulse: 101 (02/27/20 1619)  Resp: 18 (02/27/20 1619)  BP: (!) 105/57 (02/27/20 1619)  SpO2: 100 % (02/27/20 1619) Vital Signs (24h Range):  Temp:  [96.7 °F (35.9 °C)-99 °F (37.2 °C)] 97.1 °F (36.2 °C)  Pulse:  [] 101  Resp:  [18-33] 18  SpO2:  [94 %-100 %] 100 %  BP: (102-136)/(52-87) 105/57     Weight: 50.7 kg (111 lb 12.4 oz)  Body mass index is 18.6 kg/m².    Intake/Output Summary (Last 24 hours) at 2/27/2020 1656  Last data filed at 2/27/2020 0313  Gross per 24 hour   Intake 510 ml   Output 150 ml   Net 360 ml      Physical Exam   Constitutional: She appears well-developed and well-nourished.   HENT:   Head: Atraumatic.   Eyes: EOM are normal.   Pulmonary/Chest: Breath sounds normal. No respiratory distress.   Abdominal: Soft.   Neurological: She is alert.   Psychiatric: She has a normal mood and affect.   Nursing note and vitals reviewed.      Significant Labs:   Recent Labs   Lab 02/23/20  2312  02/24/20  0357 02/24/20  1641 02/27/20  1139   WBC 6.67  --  7.86  --  9.27   HGB 7.9*  --  9.0*  --  10.1*   HCT 27.1*   < > 31.8* 32* 33.0*   *  --  156  --  233    < > = values in this interval not displayed.     Recent Labs   Lab 02/23/20  2312  02/25/20  0417 02/25/20  1047 02/26/20  0427 02/27/20  0604 02/27/20  1139      < >  --  138 133*  133*  133*  --  135*   K 6.2*   < >  --  4.0 3.6  3.6  3.6 3.6 3.4*   CL 97   < >  --  96 92*  92*  92*  --  102   CO2 32*   < >  --  30* 30*  30*  30*  --  24   BUN 46*   < >  --  23 27*  27*  27*  --  22   CREATININE 3.8*   < >  --  1.9* 2.5*  2.5*  2.5*  --  1.8*   GLU 86   < >  --  118* 208*  208*  208*   --  106   CALCIUM 9.3   < >  --  10.7* 10.0  10.0  10.0  --  9.8   MG 2.1   < > 1.6  --  2.4 2.1  --    PHOS  --    < > 3.4  --  3.0 3.0  --    LIPASE 25  --   --   --   --   --   --     < > = values in this interval not displayed.     Recent Labs   Lab 02/23/20  2312   ALKPHOS 107   ALT 9*   AST 22   ALBUMIN 3.0*   PROT 6.7   BILITOT 0.3   INR 1.1      No results for input(s): CPK, CPKMB, MB, TROPONINI in the last 72 hours.  Recent Labs   Lab 02/20/20  1944 02/23/20  2302 02/24/20  0111 02/24/20  1641 02/26/20  2139 02/27/20  1207   POCTGLUCOSE 123* 123* 207* 104 120* 98     Hemoglobin A1C   Date Value Ref Range Status   01/22/2018 5.2 4.0 - 5.6 % Final     Comment:     According to ADA guidelines, hemoglobin A1c <7.0% represents  optimal control in non-pregnant diabetic patients. Different  metrics may apply to specific patient populations.   Standards of Medical Care in Diabetes-2016.  For the purpose of screening for the presence of diabetes:  <5.7%     Consistent with the absence of diabetes  5.7-6.4%  Consistent with increasing risk for diabetes   (prediabetes)  >or=6.5%  Consistent with diabetes  Currently, no consensus exists for use of hemoglobin A1c  for diagnosis of diabetes for children.  This Hemoglobin A1c assay has significant interference with fetal   hemoglobin   (HbF). The results are invalid for patients with abnormal amounts of   HbF,   including those with known Hereditary Persistence   of Fetal Hemoglobin. Heterozygous hemoglobin variants (HbAS, HbAC,   HbAD, HbAE, HbA2) do not significantly interfere with this assay;   however, presence of multiple variants in a sample may impact the %   interference.     05/14/2012 4.7 4.0 - 6.2 % Final     Scheduled Meds:   aztreonam  500 mg Intravenous Q8H    epoetin hemant-epbx  5,000 Units Intravenous Every Mon, Wed, Fri    paricalcitol  1 mcg Intravenous Q48H     Continuous Infusions:   heparin (porcine)      naloxone (NARCAN) infusion Stopped  (02/25/20 3393)     As Needed: sodium chloride 0.9%, sodium chloride 0.9%, acetaminophen, albuterol-ipratropium, heparin (porcine), naloxone, ondansetron, sodium chloride 0.9%, Pharmacy to dose Vancomycin consult **AND** vancomycin - pharmacy to dose    Significant Imaging:   No new imaging

## 2020-02-27 NOTE — INTERVAL H&P NOTE
The patient has been examined and the H&P has been reviewed:        Anesthesia/Surgery risks, benefits and alternative options discussed and understood by patient/family.          Active Hospital Problems    Diagnosis  POA    *Opioid overdose [T40.2X1A]  Yes    Palliative care encounter [Z51.5]  Not Applicable    Goals of care, counseling/discussion [Z71.89]  Not Applicable    Counseling regarding advance care planning and goals of care [Z71.89]  Not Applicable    Acute encephalopathy [G93.40]  Yes    Hyperkalemia [E87.5]  Yes    Encephalopathy, metabolic [G93.41]  Yes    ESRD (end stage renal disease) on dialysis [N18.6, Z99.2]  Not Applicable     Chronic    COPD (chronic obstructive pulmonary disease) [J44.9]  Yes     Chronic     Pt has MMRC 3 symptoms of dyspnea & multiple hospitalizations for difficulty breathing. Recommend changing spiriva inhaler to trelegy. Continue prn nebulizer treatments. Recommended pulmonary rehab though the commute may be too much for her (she lives in [place]). She will get back to me. I suggested regular aerobic exercise as the next best alternative to pulmonary rehab.      Chronic respiratory failure with hypoxia, on home O2 therapy [J96.11, Z99.81]  Not Applicable     Chronic    Essential hypertension [I10]  Yes     Chronic    Chronic low back pain [M54.5, G89.29]  Yes    Chronic neck pain [M54.2, G89.29]  Yes     Chronic    Chronic pain [G89.29]  Yes      Resolved Hospital Problems   No resolved problems to display.

## 2020-02-27 NOTE — PROGRESS NOTES
Progress Note  Nephrology      Consult Requested By: Jennifer Bowles*      SUBJECTIVE:     Overnight events  Patient is a 74 y.o. female     Patient Active Problem List   Diagnosis    Melanoma    Chronic pain    Vitamin deficiency    Cervical post-laminectomy syndrome    Lumbar postlaminectomy syndrome    Chronic neck pain    Lumbosacral spondylosis without myelopathy    Isolated cervical dystonia    Primary osteoarthritis of both knees    Subdeltoid bursitis, L>R.    Other fragments of torsion dystonia    Nuclear sclerosis - Both Eyes    Rotator cuff tear, right    Biceps tendonitis    Osteoarthritis, hip, bilateral    Lumbar radiculopathy, BLE    Cervical radiculopathy, BUE    Osteoporosis    Chronic low back pain    Iron deficiency anemia    Essential hypertension    Atrophy of left kidney    Kidney cysts    History of colon polyps    Acute on chronic respiratory failure with hypoxia    Pleural effusion, left    Pericardial effusion    Chronic respiratory failure with hypoxia, on home O2 therapy    Anemia, chronic renal failure, stage 4 (severe)    Lipoma of torso    Chronic diastolic heart failure    COPD (chronic obstructive pulmonary disease)    Non-intractable vomiting with nausea    Diastolic dysfunction, left ventricle    Acute congestive heart failure    ESRD (end stage renal disease) on dialysis    Acute encephalopathy    Hyperkalemia    Encephalopathy, metabolic    Opioid overdose    Palliative care encounter    Goals of care, counseling/discussion    Counseling regarding advance care planning and goals of care     Past Medical History:   Diagnosis Date    Acute congestive heart failure 2/10/2020    Anemia     Bilateral renal cysts     Cataract     Chronic LBP 7/26/2012    Chronic pain     CKD (chronic kidney disease), stage IV     COPD (chronic obstructive pulmonary disease)     Dehydration     Encounter for blood transfusion     HTN  (hypertension)     Lumbar spondylosis     Melanoma     of the lip    Metabolic bone disease     Migraines, neuralgic     Osteoporosis     Primary osteoarthritis of both knees     s/p Rt TKA    Pulmonary embolism with infarction     Seizures 1972    x1 only    Subdeltoid bursitis, L>R. 9/27/2012    Ulcer     Vitamin D deficiency disease               OBJECTIVE:     Vitals:    02/26/20 1700 02/26/20 1800 02/26/20 1854 02/26/20 1900   BP: (!) 105/58 (!) 102/54 (!) 106/52    BP Location:   Right arm    Patient Position:       Pulse: 93 93 102    Resp: (!) 32 (!) 33     Temp:   98.4 °F (36.9 °C)    TempSrc:       SpO2: 96% 97% 97% 99%   Weight:       Height:           Temp: 98.4 °F (36.9 °C) (02/26/20 1854)  Pulse: 102 (02/26/20 1854)  Resp: (!) 33 (02/26/20 1800)  BP: (!) 106/52 (02/26/20 1854)  SpO2: 99 % (02/26/20 1900)    Date 02/26/20 0700 - 02/27/20 0659   Shift 7811-8613 6501-1949 0167-2417 24 Hour Total   INTAKE   P.O.  250  250   Other 500   500   IV Piggyback  100  100   Shift Total(mL/kg) 500(8.5) 350(5.9)  850(14.4)   OUTPUT   Urine(mL/kg/hr)  300  300   Other 1200   1200   Shift Total(mL/kg) 1200(20.4) 300(5.1)  1500(25.5)   Weight (kg) 58.9 58.9 58.9 58.9             Medications:   aztreonam  500 mg Intravenous Q8H    diphenhydrAMINE  50 mg Oral Once    epoetin hemant-epbx  5,000 Units Intravenous Every Mon, Wed, Fri    paricalcitol  1 mcg Intravenous Q48H      naloxone (NARCAN) infusion Stopped (02/25/20 0836)               Physical Exam:  General appearance:  Weak  Poor intake  SOB  Lungs: diminished breath sounds  Heart: [id number]  Abdomen: soft  Extremities: edema  Skin:dry      Laboratory:  ABG  Labs reviewed  Recent Results (from the past 336 hour(s))   Basic metabolic panel    Collection Time: 02/26/20  4:27 AM   Result Value Ref Range    Sodium 133 (L) 136 - 145 mmol/L    Potassium 3.6 3.5 - 5.1 mmol/L    Chloride 92 (L) 95 - 110 mmol/L    CO2 30 (H) 23 - 29 mmol/L    BUN, Bld 27 (H) 8  - 23 mg/dL    Creatinine 2.5 (H) 0.5 - 1.4 mg/dL    Calcium 10.0 8.7 - 10.5 mg/dL    Anion Gap 11 8 - 16 mmol/L   Basic metabolic panel    Collection Time: 02/26/20  4:27 AM   Result Value Ref Range    Sodium 133 (L) 136 - 145 mmol/L    Potassium 3.6 3.5 - 5.1 mmol/L    Chloride 92 (L) 95 - 110 mmol/L    CO2 30 (H) 23 - 29 mmol/L    BUN, Bld 27 (H) 8 - 23 mg/dL    Creatinine 2.5 (H) 0.5 - 1.4 mg/dL    Calcium 10.0 8.7 - 10.5 mg/dL    Anion Gap 11 8 - 16 mmol/L   Basic metabolic panel    Collection Time: 02/26/20  4:27 AM   Result Value Ref Range    Sodium 133 (L) 136 - 145 mmol/L    Potassium 3.6 3.5 - 5.1 mmol/L    Chloride 92 (L) 95 - 110 mmol/L    CO2 30 (H) 23 - 29 mmol/L    BUN, Bld 27 (H) 8 - 23 mg/dL    Creatinine 2.5 (H) 0.5 - 1.4 mg/dL    Calcium 10.0 8.7 - 10.5 mg/dL    Anion Gap 11 8 - 16 mmol/L     Recent Results (from the past 336 hour(s))   CBC auto differential    Collection Time: 02/24/20  3:57 AM   Result Value Ref Range    WBC 7.86 3.90 - 12.70 K/uL    Hemoglobin 9.0 (L) 12.0 - 16.0 g/dL    Hematocrit 31.8 (L) 37.0 - 48.5 %    Platelets 156 150 - 350 K/uL   CBC auto differential    Collection Time: 02/23/20 11:12 PM   Result Value Ref Range    WBC 6.67 3.90 - 12.70 K/uL    Hemoglobin 7.9 (L) 12.0 - 16.0 g/dL    Hematocrit 27.1 (L) 37.0 - 48.5 %    Platelets 112 (L) 150 - 350 K/uL   CBC auto differential    Collection Time: 02/21/20  7:20 AM   Result Value Ref Range    WBC 6.38 3.90 - 12.70 K/uL    Hemoglobin 8.7 (L) 12.0 - 16.0 g/dL    Hematocrit 29.6 (L) 37.0 - 48.5 %    Platelets 107 (L) 150 - 350 K/uL     Urinalysis  No results for input(s): COLORU, CLARITYU, SPECGRAV, PHUR, PROTEINUA, GLUCOSEU, BILIRUBINCON, BLOODU, WBCU, RBCU, BACTERIA, MUCUS, NITRITE, LEUKOCYTESUR, UROBILINOGEN, HYALINECASTS in the last 24 hours.    Diagnostic Results:  X-Ray: Reviewed  US: Reviewed  Echo: Reviewed  ACCESS    ASSESSMENT/PLAN:     ESRD  Metabolic bone disease  Anemia  Poor nutrition  Dialysis today/  cc  /52

## 2020-02-27 NOTE — PT/OT/SLP PROGRESS
Occupational Therapy      Patient Name:  Katie Quevedo   MRN:  [id number]    Patient not seen today secondary to  AM @1140: Pt refused secondary to going to sx. RN reports pt will be going to dialysis after sx. Will follow-up tomorrow.    CRISPIN Nowak  2/27/2020

## 2020-02-27 NOTE — NURSING
Call placed to anesthesia. Informed that patient was admited to the floor from ICU and present IV was placed by anesthesia. Line was infiltrated and had to be removed. Anesthesia will follow up for IV placement as soon as possible.

## 2020-02-28 VITALS
RESPIRATION RATE: 16 BRPM | SYSTOLIC BLOOD PRESSURE: 109 MMHG | TEMPERATURE: 98 F | DIASTOLIC BLOOD PRESSURE: 51 MMHG | BODY MASS INDEX: 18.62 KG/M2 | WEIGHT: 111.75 LBS | OXYGEN SATURATION: 98 % | HEART RATE: 110 BPM | HEIGHT: 65 IN

## 2020-02-28 PROBLEM — G93.40 ACUTE ENCEPHALOPATHY: Status: RESOLVED | Noted: 2020-02-24 | Resolved: 2020-02-28

## 2020-02-28 PROBLEM — R19.7 DIARRHEA: Status: ACTIVE | Noted: 2020-02-28

## 2020-02-28 PROBLEM — T40.2X1A OPIOID OVERDOSE: Status: RESOLVED | Noted: 2020-02-24 | Resolved: 2020-02-28

## 2020-02-28 LAB
ANION GAP SERPL CALC-SCNC: 12 MMOL/L (ref 8–16)
BUN SERPL-MCNC: 25 MG/DL (ref 8–23)
C DIFF GDH STL QL: NEGATIVE
C DIFF TOX A+B STL QL IA: NEGATIVE
CALCIUM SERPL-MCNC: 10.2 MG/DL (ref 8.7–10.5)
CHLORIDE SERPL-SCNC: 101 MMOL/L (ref 95–110)
CO2 SERPL-SCNC: 23 MMOL/L (ref 23–29)
CREAT SERPL-MCNC: 1.9 MG/DL (ref 0.5–1.4)
EST. GFR  (AFRICAN AMERICAN): 29 ML/MIN/1.73 M^2
EST. GFR  (NON AFRICAN AMERICAN): 25 ML/MIN/1.73 M^2
GLUCOSE SERPL-MCNC: 96 MG/DL (ref 70–110)
MAGNESIUM SERPL-MCNC: 2 MG/DL (ref 1.6–2.6)
PHOSPHATE SERPL-MCNC: 4.1 MG/DL (ref 2.7–4.5)
POTASSIUM SERPL-SCNC: 3.4 MMOL/L (ref 3.5–5.1)
SODIUM SERPL-SCNC: 136 MMOL/L (ref 136–145)
VANCOMYCIN SERPL-MCNC: 12.7 UG/ML

## 2020-02-28 PROCEDURE — 27100171 HC OXYGEN HIGH FLOW UP TO 24 HOURS: Mod: HCNC

## 2020-02-28 PROCEDURE — S0073 INJECTION, AZTREONAM, 500 MG: HCPCS | Mod: HCNC | Performed by: INTERNAL MEDICINE

## 2020-02-28 PROCEDURE — 25000003 PHARM REV CODE 250: Mod: HCNC | Performed by: NURSE PRACTITIONER

## 2020-02-28 PROCEDURE — 83735 ASSAY OF MAGNESIUM: CPT | Mod: HCNC

## 2020-02-28 PROCEDURE — 80100016 HC MAINTENANCE HEMODIALYSIS: Mod: HCNC

## 2020-02-28 PROCEDURE — 63600175 PHARM REV CODE 636 W HCPCS: Mod: HCNC | Performed by: INTERNAL MEDICINE

## 2020-02-28 PROCEDURE — 25000003 PHARM REV CODE 250: Mod: HCNC | Performed by: INTERNAL MEDICINE

## 2020-02-28 PROCEDURE — 63600175 PHARM REV CODE 636 W HCPCS: Mod: HCNC | Performed by: NURSE PRACTITIONER

## 2020-02-28 PROCEDURE — 86334 IMMUNOFIX E-PHORESIS SERUM: CPT | Mod: 26,HCNC,, | Performed by: PATHOLOGY

## 2020-02-28 PROCEDURE — 97110 THERAPEUTIC EXERCISES: CPT | Mod: HCNC,CQ

## 2020-02-28 PROCEDURE — 87324 CLOSTRIDIUM AG IA: CPT | Mod: HCNC

## 2020-02-28 PROCEDURE — 94761 N-INVAS EAR/PLS OXIMETRY MLT: CPT | Mod: HCNC

## 2020-02-28 PROCEDURE — 80202 ASSAY OF VANCOMYCIN: CPT | Mod: HCNC

## 2020-02-28 PROCEDURE — 63600175 PHARM REV CODE 636 W HCPCS: Mod: HCNC | Performed by: HOSPITALIST

## 2020-02-28 PROCEDURE — 87449 NOS EACH ORGANISM AG IA: CPT | Mod: HCNC

## 2020-02-28 PROCEDURE — 84100 ASSAY OF PHOSPHORUS: CPT | Mod: HCNC

## 2020-02-28 PROCEDURE — 86334 PATHOLOGIST INTERPRETATION IFE: ICD-10-PCS | Mod: 26,HCNC,, | Performed by: PATHOLOGY

## 2020-02-28 PROCEDURE — 80048 BASIC METABOLIC PNL TOTAL CA: CPT | Mod: HCNC

## 2020-02-28 PROCEDURE — 36415 COLL VENOUS BLD VENIPUNCTURE: CPT | Mod: HCNC

## 2020-02-28 PROCEDURE — 82397 CHEMILUMINESCENT ASSAY: CPT | Mod: HCNC

## 2020-02-28 PROCEDURE — 86334 IMMUNOFIX E-PHORESIS SERUM: CPT | Mod: HCNC

## 2020-02-28 RX ORDER — SODIUM CHLORIDE 9 MG/ML
INJECTION, SOLUTION INTRAVENOUS ONCE
Status: COMPLETED | OUTPATIENT
Start: 2020-02-28 | End: 2020-02-28

## 2020-02-28 RX ORDER — IBUPROFEN 400 MG/1
400 TABLET ORAL ONCE
Status: DISCONTINUED | OUTPATIENT
Start: 2020-02-28 | End: 2020-02-29 | Stop reason: HOSPADM

## 2020-02-28 RX ORDER — LEVOFLOXACIN 500 MG/1
500 TABLET, FILM COATED ORAL EVERY OTHER DAY
Status: DISCONTINUED | OUTPATIENT
Start: 2020-03-01 | End: 2020-02-29 | Stop reason: HOSPADM

## 2020-02-28 RX ORDER — SODIUM CHLORIDE 9 MG/ML
INJECTION, SOLUTION INTRAVENOUS
Status: DISCONTINUED | OUTPATIENT
Start: 2020-02-28 | End: 2020-02-29 | Stop reason: HOSPADM

## 2020-02-28 RX ORDER — LEVOFLOXACIN 750 MG/1
750 TABLET ORAL ONCE
Status: COMPLETED | OUTPATIENT
Start: 2020-02-28 | End: 2020-02-28

## 2020-02-28 RX ADMIN — PARICALCITOL 1 MCG: 5 INJECTION, SOLUTION INTRAVENOUS at 01:02

## 2020-02-28 RX ADMIN — AZTREONAM 500 MG: 1 INJECTION, POWDER, LYOPHILIZED, FOR SOLUTION INTRAMUSCULAR; INTRAVENOUS at 09:02

## 2020-02-28 RX ADMIN — ONDANSETRON 4 MG: 2 INJECTION INTRAMUSCULAR; INTRAVENOUS at 12:02

## 2020-02-28 RX ADMIN — SODIUM CHLORIDE: 0.9 INJECTION, SOLUTION INTRAVENOUS at 01:02

## 2020-02-28 RX ADMIN — ACETAMINOPHEN 650 MG: 325 TABLET ORAL at 09:02

## 2020-02-28 RX ADMIN — LACTOBACILLUS TAB 4 TABLET: TAB at 04:02

## 2020-02-28 RX ADMIN — LEVOFLOXACIN 750 MG: 750 TABLET, FILM COATED ORAL at 04:02

## 2020-02-28 RX ADMIN — AZTREONAM 500 MG: 1 INJECTION, POWDER, LYOPHILIZED, FOR SOLUTION INTRAMUSCULAR; INTRAVENOUS at 12:02

## 2020-02-28 RX ADMIN — ACETAMINOPHEN 650 MG: 325 TABLET ORAL at 12:02

## 2020-02-28 RX ADMIN — ACETAMINOPHEN 650 MG: 325 TABLET ORAL at 04:02

## 2020-02-28 RX ADMIN — EPOETIN ALFA-EPBX 5000 UNITS: 3000 INJECTION, SOLUTION INTRAVENOUS; SUBCUTANEOUS at 01:02

## 2020-02-28 RX ADMIN — ONDANSETRON 4 MG: 2 INJECTION INTRAMUSCULAR; INTRAVENOUS at 03:02

## 2020-02-28 NOTE — PROGRESS NOTES
Progress Note  Nephrology      Consult Requested By: Ramos Navarro DO      SUBJECTIVE:     Overnight events  Patient is a 74 y.o. female     Patient Active Problem List   Diagnosis    Melanoma    Chronic pain    Vitamin deficiency    Cervical post-laminectomy syndrome    Lumbar postlaminectomy syndrome    Lumbosacral spondylosis without myelopathy    Isolated cervical dystonia    Primary osteoarthritis of both knees    Subdeltoid bursitis, L>R.    Other fragments of torsion dystonia    Nuclear sclerosis - Both Eyes    Rotator cuff tear, right    Biceps tendonitis    Osteoarthritis, hip, bilateral    Lumbar radiculopathy, BLE    Cervical radiculopathy, BUE    Osteoporosis    Iron deficiency anemia    Essential hypertension    Atrophy of left kidney    Kidney cysts    History of colon polyps    Acute on chronic respiratory failure with hypoxia    Pleural effusion, left    Pericardial effusion    Chronic respiratory failure with hypoxia, on home O2 therapy    Anemia, chronic renal failure, stage 4 (severe)    Lipoma of torso    Chronic diastolic heart failure    COPD (chronic obstructive pulmonary disease)    Non-intractable vomiting with nausea    Diastolic dysfunction, left ventricle    Acute congestive heart failure    ESRD (end stage renal disease) on dialysis    Acute encephalopathy    Opioid overdose     Past Medical History:   Diagnosis Date    Acute congestive heart failure 2/10/2020    Anemia     Bilateral renal cysts     Cataract     Chronic LBP 7/26/2012    Chronic pain     CKD (chronic kidney disease), stage IV     COPD (chronic obstructive pulmonary disease)     Dehydration     Encounter for blood transfusion     HTN (hypertension)     Lumbar spondylosis     Melanoma     of the lip    Metabolic bone disease     Migraines, neuralgic     Osteoporosis     Primary osteoarthritis of both knees     s/p Rt TKA    Pulmonary embolism with infarction      Seizures 1972    x1 only    Subdeltoid bursitis, L>R. 9/27/2012    Ulcer     Vitamin D deficiency disease               OBJECTIVE:     Vitals:    02/27/20 1548 02/27/20 1619 02/27/20 2000 02/27/20 2001   BP: (!) 122/57 (!) 105/57     BP Location: Right arm Right arm     Patient Position: Lying Lying     Pulse: 103 101 105 105   Resp: (!) 22 18  (!) 25   Temp:  97.1 °F (36.2 °C)     TempSrc:  Axillary     SpO2: 98% 100%  99%   Weight:       Height:           Temp: 97.1 °F (36.2 °C) (02/27/20 1619)  Pulse: 105 (02/27/20 2001)  Resp: (!) 25 (02/27/20 2001)  BP: (!) 105/57 (02/27/20 1619)  SpO2: 99 % (02/27/20 2001)              Medications:   [START ON 2/28/2020] amLODIPine  2.5 mg Oral Daily    aztreonam  500 mg Intravenous Q8H    epoetin hemant-epbx  5,000 Units Intravenous Every Mon, Wed, Fri    paricalcitol  1 mcg Intravenous Q48H      heparin (porcine)      naloxone (NARCAN) infusion Stopped (02/25/20 0836)               Physical Exam:  General appearance:NAD  SOB with exertion  Lungs: diminished breath sounds  Heart: [id number]  Abdomen: soft  Extremities: edema    Laboratory:  ABG  Labs reviewed  Recent Results (from the past 336 hour(s))   Basic metabolic panel    Collection Time: 02/27/20 11:39 AM   Result Value Ref Range    Sodium 135 (L) 136 - 145 mmol/L    Potassium 3.4 (L) 3.5 - 5.1 mmol/L    Chloride 102 95 - 110 mmol/L    CO2 24 23 - 29 mmol/L    BUN, Bld 22 8 - 23 mg/dL    Creatinine 1.8 (H) 0.5 - 1.4 mg/dL    Calcium 9.8 8.7 - 10.5 mg/dL    Anion Gap 9 8 - 16 mmol/L   Basic metabolic panel    Collection Time: 02/26/20  4:27 AM   Result Value Ref Range    Sodium 133 (L) 136 - 145 mmol/L    Potassium 3.6 3.5 - 5.1 mmol/L    Chloride 92 (L) 95 - 110 mmol/L    CO2 30 (H) 23 - 29 mmol/L    BUN, Bld 27 (H) 8 - 23 mg/dL    Creatinine 2.5 (H) 0.5 - 1.4 mg/dL    Calcium 10.0 8.7 - 10.5 mg/dL    Anion Gap 11 8 - 16 mmol/L   Basic metabolic panel    Collection Time: 02/26/20  4:27 AM   Result Value Ref Range     Sodium 133 (L) 136 - 145 mmol/L    Potassium 3.6 3.5 - 5.1 mmol/L    Chloride 92 (L) 95 - 110 mmol/L    CO2 30 (H) 23 - 29 mmol/L    BUN, Bld 27 (H) 8 - 23 mg/dL    Creatinine 2.5 (H) 0.5 - 1.4 mg/dL    Calcium 10.0 8.7 - 10.5 mg/dL    Anion Gap 11 8 - 16 mmol/L     Recent Results (from the past 336 hour(s))   CBC auto differential    Collection Time: 02/27/20 11:39 AM   Result Value Ref Range    WBC 9.27 3.90 - 12.70 K/uL    Hemoglobin 10.1 (L) 12.0 - 16.0 g/dL    Hematocrit 33.0 (L) 37.0 - 48.5 %    Platelets 233 150 - 350 K/uL   CBC auto differential    Collection Time: 02/24/20  3:57 AM   Result Value Ref Range    WBC 7.86 3.90 - 12.70 K/uL    Hemoglobin 9.0 (L) 12.0 - 16.0 g/dL    Hematocrit 31.8 (L) 37.0 - 48.5 %    Platelets 156 150 - 350 K/uL   CBC auto differential    Collection Time: 02/23/20 11:12 PM   Result Value Ref Range    WBC 6.67 3.90 - 12.70 K/uL    Hemoglobin 7.9 (L) 12.0 - 16.0 g/dL    Hematocrit 27.1 (L) 37.0 - 48.5 %    Platelets 112 (L) 150 - 350 K/uL     Urinalysis  No results for input(s): COLORU, CLARITYU, SPECGRAV, PHUR, PROTEINUA, GLUCOSEU, BILIRUBINCON, BLOODU, WBCU, RBCU, BACTERIA, MUCUS, NITRITE, LEUKOCYTESUR, UROBILINOGEN, HYALINECASTS in the last 24 hours.    Diagnostic Results:  X-Ray: Reviewed  US: Reviewed  Echo: Reviewed  ACCESS    ASSESSMENT/PLAN:   ESRD  Metabolic bone disease  Anemia  Poor nutrition  Hypotension  /57  Dialysis in am

## 2020-02-28 NOTE — PT/OT/SLP PROGRESS
Occupational Therapy      Patient Name:  Katie Quevedo   MRN:  [id number]    Patient not seen today secondary to AM 1110: pt in dialysis. Will follow-up at later time.    CRISPIN Nowak  2/28/2020

## 2020-02-28 NOTE — PLAN OF CARE
No acute events overnight. Patient continued on high flow. Complained of back pain and given Prn tylenol per MAR. Other medications given per MAR. Bed locked and low, bed alarm set. WCTM.

## 2020-02-28 NOTE — PLAN OF CARE
Problem: Physical Therapy Goal  Goal: Physical Therapy Goal  Description  Goals to be met by: 3/26/2020     Patient will increase functional independence with mobility by performin. Supine to sit with Modified [place]  2. Sit to supine with Modified [place]  3. Sit to stand transfer with Supervision using Rolling Walker  4. Bed to chair transfer with Supervision using Rolling Walker  5. Gait  x 50 feet with Supervision using Rolling Walker.   6. Ascend/Descend 4 inch curb step with Minimum Assistance using Rolling Walker.  7. Lower extremity exercise program x10 reps with independence      Outcome: Ongoing, Progressing   Pt continues to work toward all established goals. Able to perform ambulation training ~10 ft within room with RW, 2L of O2 donned, and min/CGA.

## 2020-02-28 NOTE — NURSING
Pt refused ordered ibuprofen, states she can't take NSAIDs. Diarrhea specimen sent to lab. Contact isolation initiated. Up to BSC with 1 person assist. Probiotic started. Held norvasc til after HD but pt declined because of low blood pressure 107/63. Tylenol given for pain 6-8/10 to lower back with some effect. SOB with exertion, PRN neb tx effective but patient tachycardic. O2 4L nc. Pt and  repeatedly asking when they are being discharged. Will continue to monitor.

## 2020-02-28 NOTE — NURSING
Yellow watery diarrhea x3 this AM with incontinence. Bath given by Spa team. Ambulates with minimum assist to BSC. Sent to dialysis with O2.

## 2020-02-28 NOTE — PT/OT/SLP PROGRESS
"Physical Therapy Treatment    Patient Name:  Katie Quevedo   MRN:  [id number]    Recommendations:     Discharge Recommendations:  home health PT, home health OT   Discharge Equipment Recommendations: none   Barriers to discharge: None    Assessment:     Katie Quevedo is a 74 y.o. female admitted with a medical diagnosis of Opioid overdose.  She presents with the following impairments/functional limitations:  weakness, impaired endurance, impaired self care skills, impaired functional mobilty, gait instability, impaired balance, decreased coordination, decreased upper extremity function, decreased lower extremity function, pain, decreased safety awareness, decreased ROM, impaired cardiopulmonary response to activity. Agreed to treatment despite feeling nauseous. Pt able to perform ambulation training ~10 ft within room with RW, 2L of O2 donned, and CGA/min A. Would benefit from continued PT services to increase out of bed therapeutic activity and exercise addressing all impairments listed above.    Rehab Prognosis: Good; patient would benefit from acute skilled PT services to address these deficits and reach maximum level of function.    Recent Surgery: Procedure(s) (LRB):  Fistulogram (N/A)  PTA, Fistula 1 Day Post-Op    Plan:     During this hospitalization, patient to be seen 5 x/week to address the identified rehab impairments via gait training, therapeutic activities, therapeutic exercises and progress toward the following goals:    · Plan of Care Expires:  03/26/20    Subjective     Chief Complaint: None Expressed  Patient/Family Comments/goals: "I feel nauseous but I'll see what I can do for you".  Pain/Comfort:  · Pain Rating 1: (Did not rate)  · Location - Side 1: Bilateral  · Location - Orientation 1: lower  · Location 1: back  · Pain Addressed 1: Distraction, Reposition, Cessation of Activity  · Pain Rating Post-Intervention 1: (Did not rate)      Objective:     Communicated with nurse prior to session.  Patient " found supine with peripheral IV, oxygen, telemetry upon PT entry to room.     General Precautions: Standard, fall   Orthopedic Precautions:N/A   Braces: N/A     Functional Mobility:  · Bed Mobility:     · Rolling Left:  modified independence and supervision  · Scooting: modified independence and supervision  · Supine to Sit: supervision  · Sit to Supine: supervision  · Transfers:     · Sit to Stand:  stand by assistance with rolling walker  · Gait:  ~10 ft within room with RW, 2L of O2 donned, and CGA/min A      AM-PAC 6 CLICK MOBILITY  Turning over in bed (including adjusting bedclothes, sheets and blankets)?: 4  Sitting down on and standing up from a chair with arms (e.g., wheelchair, bedside commode, etc.): 3  Moving from lying on back to sitting on the side of the bed?: 4  Moving to and from a bed to a chair (including a wheelchair)?: 3  Need to walk in hospital room?: 3  Climbing 3-5 steps with a railing?: 2  Basic Mobility Total Score: 19       Therapeutic Activities and Exercises:   Pt stated she felt nauseous but was willing to participate. Performed marches 1 x 10 reps while standing within RW boundaries. Ambulation training ~10 ft within room with RW, 2L of O2 donned, and CGA/min A. Seated therapeutic exercises 1 x 10 reps BLE's LAQ's and hip add/abd. Required ~2 rest breaks during exercises. Declined to any additional ambualtion and requested to return supine.    Patient left supine with all lines intact, call button in reach, bed alarm on,  nurse notified and   present..    GOALS:   Multidisciplinary Problems     Physical Therapy Goals        Problem: Physical Therapy Goal    Goal Priority Disciplines Outcome Goal Variances Interventions   Physical Therapy Goal     PT, PT/OT Ongoing, Progressing     Description:  Goals to be met by: 3/26/2020     Patient will increase functional independence with mobility by performin. Supine to sit with Modified [place]  2. Sit to supine with Modified  [place]  3. Sit to stand transfer with Supervision using Rolling Walker  4. Bed to chair transfer with Supervision using Rolling Walker  5. Gait  x 50 feet with Supervision using Rolling Walker.   6. Ascend/Descend 4 inch curb step with Minimum Assistance using Rolling Walker.  7. Lower extremity exercise program x10 reps with independence                       Time Tracking:     PT Received On: 02/28/20  PT Start Time: 1545     PT Stop Time: 1604  PT Total Time (min): 19 min     Billable Minutes: Gait Training  5 not billable and Therapeutic Exercise  14    Treatment Type: Treatment  PT/PTA: PTA     PTA Visit Number: 1     Erin Street, PTA  02/28/2020

## 2020-02-29 LAB
BACTERIA BLD CULT: NORMAL
BACTERIA BLD CULT: NORMAL

## 2020-02-29 NOTE — PROGRESS NOTES
[facility] Hospital Medicine  Progress Note    Patient Name: Katie Quevedo  MRN: [id number]  Patient Class: IP- Inpatient   Admission Date: 2/23/2020  Length of Stay: 4 days  Attending Physician: Ramos Navarro DO  Primary Care Provider: Kingston Verduzco MD        Subjective:     Principal Problem:Opioid overdose        HPI:  Katie Quevedo is a 75 y/o female with PMHx of COPD on 2 liters home oxygen at baseline, chronic pain, lumbar spondylosis, vitamin D deficiency dx, anemia, migraines, osteoarthritis, HTN,  PE with infarction, CKD stage IV, seizures, lumbar spondylosis, pulmonary embolism and acute CHF, who presents 2 hours history of AMS. Her  notes that she had eaten dinner well without any problems and later became unresponsive. Patient recently started on dialysis and has not dialyzed in 1 week since her dialysis access has not been functioning.  Patient was scheduled to have her access de-clotted 2/24 AM.  SBP 60-70s in ED. Requiring  narcan gtt. Taking norco 10 at home     Overview/Hospital Course:  2/27 pt had an fistulagram today by cardiology, treat elevated BP with PRN hydralazine. F/u with nephrology   2/28 the HD fistula is functioning well, pt wanted to go home but complaining of continuous diarrhea, we will check c diff and once resulted will make decision to dc pt home    Interval History: complaining of continuous diarrhea, we will check c diff and once resulted will make decision to dc pt home    Review of Systems   Constitutional: Negative for chills, diaphoresis and fatigue.   Cardiovascular: Negative for chest pain.   Gastrointestinal: Positive for diarrhea (continuous, yellowish ).   Neurological: Negative for dizziness.   Psychiatric/Behavioral: Negative for agitation and confusion. The patient is not nervous/anxious.      Objective:     Vital Signs (Most Recent):  Temp: 97.5 °F (36.4 °C) (02/28/20 1400)  Pulse: (!) 125 (02/28/20 1548)  Resp: 18 (02/28/20 1400)  BP: 107/63  (02/28/20 1400)  SpO2: (pt off unit) (02/28/20 1148) Vital Signs (24h Range):  Temp:  [96.1 °F (35.6 °C)-97.6 °F (36.4 °C)] 97.5 °F (36.4 °C)  Pulse:  [] 125  Resp:  [16-25] 18  SpO2:  [97 %-100 %] 99 %  BP: ()/(57-76) 107/63     Weight: 50.7 kg (111 lb 12.4 oz)  Body mass index is 18.6 kg/m².    Intake/Output Summary (Last 24 hours) at 2/28/2020 1832  Last data filed at 2/28/2020 1347  Gross per 24 hour   Intake 625 ml   Output 1850 ml   Net -1225 ml      Physical Exam   Constitutional: She is oriented to person, place, and time. She appears well-developed and well-nourished.   HENT:   Head: Atraumatic.   Eyes: EOM are normal.   Pulmonary/Chest: Breath sounds normal. No respiratory distress.   Abdominal: Soft.   Neurological: She is alert and oriented to person, place, and time.   Psychiatric: She has a normal mood and affect. Her behavior is normal. Thought content normal.   Nursing note and vitals reviewed.      Significant Labs:   Recent Labs   Lab 02/23/20  2312  02/24/20  0357 02/24/20  1641 02/27/20  1139   WBC 6.67  --  7.86  --  9.27   HGB 7.9*  --  9.0*  --  10.1*   HCT 27.1*   < > 31.8* 32* 33.0*   *  --  156  --  233    < > = values in this interval not displayed.     Recent Labs   Lab 02/23/20  2312  02/26/20  0427 02/27/20  0604 02/27/20  1139 02/28/20  0306      < > 133*  133*  133*  --  135* 136   K 6.2*   < > 3.6  3.6  3.6 3.6 3.4* 3.4*   CL 97   < > 92*  92*  92*  --  102 101   CO2 32*   < > 30*  30*  30*  --  24 23   BUN 46*   < > 27*  27*  27*  --  22 25*   CREATININE 3.8*   < > 2.5*  2.5*  2.5*  --  1.8* 1.9*   GLU 86   < > 208*  208*  208*  --  106 96   CALCIUM 9.3   < > 10.0  10.0  10.0  --  9.8 10.2   MG 2.1   < > 2.4 2.1  --  2.0   PHOS  --    < > 3.0 3.0  --  4.1   LIPASE 25  --   --   --   --   --     < > = values in this interval not displayed.     Recent Labs   Lab 02/23/20  2312   ALKPHOS 107   ALT 9*   AST 22   ALBUMIN 3.0*   PROT 6.7   BILITOT  0.3   INR 1.1      No results for input(s): CPK, CPKMB, MB, TROPONINI in the last 72 hours.  Recent Labs   Lab 02/23/20  2302 02/24/20  0111 02/24/20  1641 02/26/20  2139 02/27/20  1207   POCTGLUCOSE 123* 207* 104 120* 98     Hemoglobin A1C   Date Value Ref Range Status   01/22/2018 5.2 4.0 - 5.6 % Final     Comment:     According to ADA guidelines, hemoglobin A1c <7.0% represents  optimal control in non-pregnant diabetic patients. Different  metrics may apply to specific patient populations.   Standards of Medical Care in Diabetes-2016.  For the purpose of screening for the presence of diabetes:  <5.7%     Consistent with the absence of diabetes  5.7-6.4%  Consistent with increasing risk for diabetes   (prediabetes)  >or=6.5%  Consistent with diabetes  Currently, no consensus exists for use of hemoglobin A1c  for diagnosis of diabetes for children.  This Hemoglobin A1c assay has significant interference with fetal   hemoglobin   (HbF). The results are invalid for patients with abnormal amounts of   HbF,   including those with known Hereditary Persistence   of Fetal Hemoglobin. Heterozygous hemoglobin variants (HbAS, HbAC,   HbAD, HbAE, HbA2) do not significantly interfere with this assay;   however, presence of multiple variants in a sample may impact the %   interference.     05/14/2012 4.7 4.0 - 6.2 % Final     Scheduled Meds:   amLODIPine  2.5 mg Oral Daily    epoetin hemant-epbx  5,000 Units Intravenous Every Mon, Wed, Fri    ibuprofen  400 mg Oral Once    Lactobacillus acidoph-L.bulgar  4 tablet Oral TID WM    [START ON 3/1/2020] levoFLOXacin  500 mg Oral Every other day    paricalcitol  1 mcg Intravenous Q48H     Continuous Infusions:   heparin (porcine)      naloxone (NARCAN) infusion Stopped (02/25/20 0836)     As Needed: sodium chloride 0.9%, sodium chloride 0.9%, sodium chloride 0.9%, acetaminophen, albuterol-ipratropium, heparin (porcine), naloxone, ondansetron, sodium chloride 0.9%    Significant  Imaging:   No new imaging          Assessment/Plan:      Diarrhea  2/28 need to r/o c diff.  Check cultures and then decide on course of treatment      Essential hypertension    2/28 stable PRN hydralazine    ESRD (end stage renal disease) on dialysis  2/28 S/P fistulogram repair   Functioning well        Chronic respiratory failure with hypoxia, on home O2 therapy    2/28 Continue supplemental O2, prn nebs         VTE Risk Mitigation (From admission, onward)         Ordered     heparin infusion 1,000 units/500 ml in 0.9% NaCl (pressure line flush)  Intra-op continuous PRN      02/27/20 1236     IP VTE HIGH RISK PATIENT  Once      02/24/20 0343     Place sequential compression device  Until discontinued      02/24/20 0343     Place DAWN hose  Until discontinued      02/24/20 0056                      Ramos Navarro DO  Department of Hospital Medicine   [facility]

## 2020-02-29 NOTE — NURSING
Reviewed discharge instructions and medications.  Allowed for questions.  Awaiting wheelchair for discharge.

## 2020-02-29 NOTE — SUBJECTIVE & OBJECTIVE
Interval History: complaining of continuous diarrhea, we will check c diff and once resulted will make decision to dc pt home    Review of Systems   Constitutional: Negative for chills, diaphoresis and fatigue.   Cardiovascular: Negative for chest pain.   Gastrointestinal: Positive for diarrhea (continuous, yellowish ).   Neurological: Negative for dizziness.   Psychiatric/Behavioral: Negative for agitation and confusion. The patient is not nervous/anxious.      Objective:     Vital Signs (Most Recent):  Temp: 97.5 °F (36.4 °C) (02/28/20 1400)  Pulse: (!) 125 (02/28/20 1548)  Resp: 18 (02/28/20 1400)  BP: 107/63 (02/28/20 1400)  SpO2: (pt off unit) (02/28/20 1148) Vital Signs (24h Range):  Temp:  [96.1 °F (35.6 °C)-97.6 °F (36.4 °C)] 97.5 °F (36.4 °C)  Pulse:  [] 125  Resp:  [16-25] 18  SpO2:  [97 %-100 %] 99 %  BP: ()/(57-76) 107/63     Weight: 50.7 kg (111 lb 12.4 oz)  Body mass index is 18.6 kg/m².    Intake/Output Summary (Last 24 hours) at 2/28/2020 1832  Last data filed at 2/28/2020 1347  Gross per 24 hour   Intake 625 ml   Output 1850 ml   Net -1225 ml      Physical Exam   Constitutional: She is oriented to person, place, and time. She appears well-developed and well-nourished.   HENT:   Head: Atraumatic.   Eyes: EOM are normal.   Pulmonary/Chest: Breath sounds normal. No respiratory distress.   Abdominal: Soft.   Neurological: She is alert and oriented to person, place, and time.   Psychiatric: She has a normal mood and affect. Her behavior is normal. Thought content normal.   Nursing note and vitals reviewed.      Significant Labs:   Recent Labs   Lab 02/23/20  2312  02/24/20  0357 02/24/20  1641 02/27/20  1139   WBC 6.67  --  7.86  --  9.27   HGB 7.9*  --  9.0*  --  10.1*   HCT 27.1*   < > 31.8* 32* 33.0*   *  --  156  --  233    < > = values in this interval not displayed.     Recent Labs   Lab 02/23/20  2312  02/26/20  0427 02/27/20  0604 02/27/20  1139 02/28/20  0306      < >  133*  133*  133*  --  135* 136   K 6.2*   < > 3.6  3.6  3.6 3.6 3.4* 3.4*   CL 97   < > 92*  92*  92*  --  102 101   CO2 32*   < > 30*  30*  30*  --  24 23   BUN 46*   < > 27*  27*  27*  --  22 25*   CREATININE 3.8*   < > 2.5*  2.5*  2.5*  --  1.8* 1.9*   GLU 86   < > 208*  208*  208*  --  106 96   CALCIUM 9.3   < > 10.0  10.0  10.0  --  9.8 10.2   MG 2.1   < > 2.4 2.1  --  2.0   PHOS  --    < > 3.0 3.0  --  4.1   LIPASE 25  --   --   --   --   --     < > = values in this interval not displayed.     Recent Labs   Lab 02/23/20  2312   ALKPHOS 107   ALT 9*   AST 22   ALBUMIN 3.0*   PROT 6.7   BILITOT 0.3   INR 1.1      No results for input(s): CPK, CPKMB, MB, TROPONINI in the last 72 hours.  Recent Labs   Lab 02/23/20  2302 02/24/20  0111 02/24/20  1641 02/26/20  2139 02/27/20  1207   POCTGLUCOSE 123* 207* 104 120* 98     Hemoglobin A1C   Date Value Ref Range Status   01/22/2018 5.2 4.0 - 5.6 % Final     Comment:     According to ADA guidelines, hemoglobin A1c <7.0% represents  optimal control in non-pregnant diabetic patients. Different  metrics may apply to specific patient populations.   Standards of Medical Care in Diabetes-2016.  For the purpose of screening for the presence of diabetes:  <5.7%     Consistent with the absence of diabetes  5.7-6.4%  Consistent with increasing risk for diabetes   (prediabetes)  >or=6.5%  Consistent with diabetes  Currently, no consensus exists for use of hemoglobin A1c  for diagnosis of diabetes for children.  This Hemoglobin A1c assay has significant interference with fetal   hemoglobin   (HbF). The results are invalid for patients with abnormal amounts of   HbF,   including those with known Hereditary Persistence   of Fetal Hemoglobin. Heterozygous hemoglobin variants (HbAS, HbAC,   HbAD, HbAE, HbA2) do not significantly interfere with this assay;   however, presence of multiple variants in a sample may impact the %   interference.     05/14/2012 4.7 4.0 - 6.2 %  Final     Scheduled Meds:   amLODIPine  2.5 mg Oral Daily    epoetin hemant-epbx  5,000 Units Intravenous Every Mon, Wed, Fri    ibuprofen  400 mg Oral Once    Lactobacillus acidoph-L.bulgar  4 tablet Oral TID WM    [START ON 3/1/2020] levoFLOXacin  500 mg Oral Every other day    paricalcitol  1 mcg Intravenous Q48H     Continuous Infusions:   heparin (porcine)      naloxone (NARCAN) infusion Stopped (02/25/20 0836)     As Needed: sodium chloride 0.9%, sodium chloride 0.9%, sodium chloride 0.9%, acetaminophen, albuterol-ipratropium, heparin (porcine), naloxone, ondansetron, sodium chloride 0.9%    Significant Imaging:   No new imaging

## 2020-02-29 NOTE — PLAN OF CARE
Problem: Fall Injury Risk  Goal: Absence of Fall and Fall-Related Injury  Outcome: Ongoing, Progressing     Problem: Adult Inpatient Plan of Care  Goal: Plan of Care Review  Outcome: Ongoing, Progressing  Goal: Patient-Specific Goal (Individualization)  Outcome: Ongoing, Progressing  Goal: Absence of Hospital-Acquired Illness or Injury  Outcome: Ongoing, Progressing  Goal: Optimal Comfort and Wellbeing  Outcome: Ongoing, Progressing  Goal: Readiness for Transition of Care  Outcome: Ongoing, Progressing  Goal: Rounds/Family Conference  Outcome: Ongoing, Progressing     Problem: Skin Injury Risk Increased  Goal: Skin Health and Integrity  Outcome: Ongoing, Progressing     Problem: Wound  Goal: Optimal Wound Healing  Outcome: Ongoing, Progressing     Problem: Device-Related Complication Risk (Hemodialysis)  Goal: Safe, Effective Therapy Delivery  Outcome: Ongoing, Progressing     Problem: Hemodynamic Instability (Hemodialysis)  Goal: Vital Signs Remain in Desired Range  Outcome: Ongoing, Progressing     Problem: Infection (Hemodialysis)  Goal: Absence of Infection Signs/Symptoms  Outcome: Ongoing, Progressing     Problem: Infection  Goal: Infection Symptom Resolution  Outcome: Ongoing, Progressing     Problem: Coping Ineffective  Goal: Effective Coping  Outcome: Ongoing, Progressing

## 2020-02-29 NOTE — DISCHARGE SUMMARY
[facility] Hospital Medicine  Discharge Summary      Patient Name: Katie Quevedo  MRN: [id number]  Admission Date: 2/23/2020  Hospital Length of Stay: 4 days  Discharge Date and Time: No discharge date for patient encounter.  Attending Physician: Ramos Navarro DO   Discharging Provider: Ramos Navarro DO  Primary Care Provider: Kingston Verduzco MD      HPI:   Katie Quevedo is a 75 y/o female with PMHx of COPD on 2 liters home oxygen at baseline, chronic pain, lumbar spondylosis, vitamin D deficiency dx, anemia, migraines, osteoarthritis, HTN,  PE with infarction, CKD stage IV, seizures, lumbar spondylosis, pulmonary embolism and acute CHF, who presents 2 hours history of AMS. Her  notes that she had eaten dinner well without any problems and later became unresponsive. Patient recently started on dialysis and has not dialyzed in 1 week since her dialysis access has not been functioning.  Patient was scheduled to have her access de-clotted 2/24 AM.  SBP 60-70s in ED. Requiring  narcan gtt. Taking norco 10 at home     Procedure(s) (LRB):  Fistulogram (N/A)  PTA, Fistula      Hospital Course:   2/27 pt had an fistulagram today by cardiology, treat elevated BP with PRN hydralazine. F/u with nephrology   2/28 the HD fistula is functioning well, pt wanted to go home but complaining of continuous diarrhea, we will check c diff and once resulted will make decision to dc pt home  C diff came bACK NEGATIVE AND PT IS ASKING TO BE DC HOME TONIGHT     Consults:   Consults (From admission, onward)        Status Ordering Provider     Hospital Medicine PharmD Consult  Once     Provider:  (Not yet assigned)    Acknowledged GIL JOSEPH     Inpatient consult to Anesthesiology  Once     Provider:  Dre Lorenzana MD    Acknowledged JESSICA BROWN     Inpatient consult to Cardiology[facility]  Once     Provider:  Noe Benitez MD    Completed JESSICA BROWN     Inpatient consult to General Surgery  Once      Provider:  Lindsey Louie MD    Acknowledged AURA WINTER     Inpatient consult to Nephrology-[facility] (Caterina Bustillos Nimkevych)  Once     Provider:  Aura Winter MD    Completed LAAL CARNES     Inpatient consult to Palliative Care  Once     Provider:  Sobia Taveras NP    Completed CHINA MCWILLIAMS     Pharmacy to dose Vancomycin consult  Once     Provider:  (Not yet assigned)    Acknowledged GIL JOSEPH          No new Assessment & Plan notes have been filed under this hospital service since the last note was generated.  Service: Hospital Medicine    Final Active Diagnoses:    Diagnosis Date Noted POA    Diarrhea [R19.7] 02/28/2020 No    Essential hypertension [I10] 01/22/2018 Yes     Chronic    ESRD (end stage renal disease) on dialysis [N18.6, Z99.2] 02/19/2020 Not Applicable     Chronic    COPD (chronic obstructive pulmonary disease) [J44.9] 12/18/2019 Yes     Chronic    Chronic respiratory failure with hypoxia, on home O2 therapy [J96.11, Z99.81] 07/18/2019 Not Applicable     Chronic    Chronic pain [G89.29]  Yes      Problems Resolved During this Admission:    Diagnosis Date Noted Date Resolved POA    PRINCIPAL PROBLEM:  Opioid overdose [T40.2X1A] 02/24/2020 02/28/2020 Yes    Palliative care encounter [Z51.5] 02/26/2020 02/27/2020 Not Applicable    Goals of care, counseling/discussion [Z71.89] 02/26/2020 02/27/2020 Not Applicable    Counseling regarding advance care planning and goals of care [Z71.89] 02/26/2020 02/27/2020 Not Applicable    Acute encephalopathy [G93.40] 02/24/2020 02/28/2020 Yes    Hyperkalemia [E87.5] 02/24/2020 02/27/2020 Yes    Encephalopathy, metabolic [G93.41] 02/24/2020 02/27/2020 Yes    Chronic low back pain [M54.5, G89.29] 09/25/2015 02/27/2020 Yes    Chronic neck pain [M54.2, G89.29] 07/24/2012 02/27/2020 Yes     Chronic       Discharged Condition: stable    Disposition: Home or Self Care    Follow Up:  Follow-up  Information     Kingston Verduzco MD In 3 days.    Specialty:  Family Medicine  Contact information:  [address]  [address]  [place] [id number] [phone]                 Patient Instructions:      Diet renal     Notify your health care provider if you experience any of the following:  temperature >100.4     Notify your health care provider if you experience any of the following:  persistent nausea and vomiting or diarrhea     Notify your health care provider if you experience any of the following:  severe uncontrolled pain     Notify your health care provider if you experience any of the following:  difficulty breathing or increased cough     Notify your health care provider if you experience any of the following:  severe persistent headache     Notify your health care provider if you experience any of the following:  worsening rash     Notify your health care provider if you experience any of the following:  persistent dizziness, light-headedness, or visual disturbances     Notify your health care provider if you experience any of the following:  increased confusion or weakness     Activity as tolerated       Significant Diagnostic Studies: Labs:   BMP:   Recent Labs   Lab 02/27/20  0604 02/27/20  1139 02/28/20  0306   GLU  --  106 96   NA  --  135* 136   K 3.6 3.4* 3.4*   CL  --  102 101   CO2  --  24 23   BUN  --  22 25*   CREATININE  --  1.8* 1.9*   CALCIUM  --  9.8 10.2   MG 2.1  --  2.0   , CMP   Recent Labs   Lab 02/27/20  0604 02/27/20  1139 02/28/20  0306   NA  --  135* 136   K 3.6 3.4* 3.4*   CL  --  102 101   CO2  --  24 23   GLU  --  106 96   BUN  --  22 25*   CREATININE  --  1.8* 1.9*   CALCIUM  --  9.8 10.2   ANIONGAP  --  9 12   ESTGFRAFRICA  --  31* 29*   EGFRNONAA  --  27* 25*    and CBC   Recent Labs   Lab 02/27/20  1139   WBC 9.27   HGB 10.1*   HCT 33.0*        Microbiology Results (last 7 days)     Procedure Component Value Units Date/Time    Clostridium difficile EIA [[id number]]  Collected:  02/28/20 1643    Order Status:  Completed Specimen:  Stool Updated:  02/28/20 1848     C. diff Antigen Negative     C difficile Toxins A+B, EIA Negative     Comment: Testing not recommended for children <24 months old.       Blood culture #1 **CANNOT BE ORDERED STAT** [[id number]] Collected:  02/23/20 2312    Order Status:  Completed Specimen:  Blood from Peripheral, Antecubital, Right Updated:  02/28/20 0612     Blood Culture, Routine No Growth to date      No Growth to date      No Growth to date      No Growth to date      No Growth to date    Blood culture #2 **CANNOT BE ORDERED STAT** [[id number]] Collected:  02/23/20 2319    Order Status:  Completed Specimen:  Blood from Peripheral, Forearm, Right Updated:  02/28/20 0612     Blood Culture, Routine No Growth to date      No Growth to date      No Growth to date      No Growth to date      No Growth to date          Pending Diagnostic Studies:     Procedure Component Value Units Date/Time    Immunofixation electrophoresis [[id number]] Collected:  02/28/20 1356    Order Status:  Sent Lab Status:  In process Updated:  02/28/20 1630    Specimen:  Blood     PTH-related peptide [[id number]] Collected:  02/28/20 1356    Order Status:  Sent Lab Status:  In process Updated:  02/28/20 1623    Specimen:  Blood          Medications:  Reconciled Home Medications:      Medication List      START taking these medications    epoetin hemant-epbx 10,000 unit/mL imjection  Commonly known as:  RETACRIT  Inject 0.5 mLs (5,000 Units total) into the skin every Mon, Wed, Fri.  Start taking on:  March 2, 2020        CONTINUE taking these medications    albuterol-ipratropium 2.5 mg-0.5 mg/3 mL nebulizer solution  Commonly known as:  DUO-NEB  Take 3 mLs by nebulization every 6 (six) hours as needed for Shortness of Breath. Rescue     allopurinoL 100 MG tablet  Commonly known as:  ZYLOPRIM  Take 1 tablet (100 mg total) by mouth on Tuesday, Thursday, Saturday, and Sunday.      amLODIPine 2.5 MG tablet  Commonly known as:  NORVASC  Take 2.5 mg by mouth once daily.     busPIRone 15 MG tablet  Commonly known as:  BUSPAR  Take 1 tablet (15 mg total) by mouth 2 (two) times daily.     clonazePAM 1 MG tablet  Commonly known as:  KLONOPIN  Take 1 tablet (1 mg total) by mouth 2 (two) times daily as needed for Anxiety.     cyproheptadine 4 mg tablet  Commonly known as:  PERIACTIN  Take 1 tablet (4 mg total) by mouth every 8 (eight) hours.     diphenhydrAMINE 25 mg capsule  Commonly known as:  BENADRYL  Take 25 mg by mouth every 6 (six) hours as needed for Itching.     * furosemide 80 MG tablet  Commonly known as:  LASIX  Take 1 tablet (80 mg total) by mouth twice daily on non dialysis days Tuesday, Thursday, Saturday, and Sunday.     * furosemide 80 MG tablet  Commonly known as:  LASIX  Take 1 tablet (80 mg total) by mouth 2 (two) times daily.     HYDROcodone-acetaminophen  mg per tablet  Commonly known as:  NORCO  Take 1 tablet by mouth 3 (three) times daily as needed.     levoFLOXacin 750 MG tablet  Commonly known as:  LEVAQUIN  Take 1 tablet (750 mg total) by mouth once daily.     lidocaine-prilocaine cream  Commonly known as:  EMLA  Apply topically to left arm prior to dialysis.     mupirocin 2 % ointment  Commonly known as:  BACTROBAN  apply by Nasal route 2 (two) times daily.     ondansetron 4 MG Tbdl  Commonly known as:  ZOFRAN-ODT  Dissolve one tablet under the tongue every 6 (six) hours as needed.     potassium chloride SA 20 MEQ tablet  Commonly known as:  K-DUR,KLOR-CON  Take 2 tablets (40 mEq total) by mouth once daily.     sodium bicarbonate 650 MG tablet  Take 1 tablet (650 mg total) by mouth 2 (two) times daily.     traZODone 100 MG tablet  Commonly known as:  DESYREL  Take 100 mg by mouth nightly as needed for Insomnia.     Trelegy Ellipta 100-62.5-25 mcg Dsdv  Generic drug:  fluticasone-umeclidin-vilanter  Inhale 1 puff by mouth into the lungs once daily.     Vitamin D2  50,000 unit Cap  Generic drug:  ergocalciferol  Take 1 capsule by mouth once weekly for 10 weeks, then take 1 capsule by mouth once monthly.     zolpidem 5 MG Tab  Commonly known as:  AMBIEN  Take 5 mg by mouth every evening.         * This list has 2 medication(s) that are the same as other medications prescribed for you. Read the directions carefully, and ask your doctor or other care provider to review them with you.            STOP taking these medications    oseltamivir 30 MG capsule  Commonly known as:  TAMIFLU            Indwelling Lines/Drains at time of discharge:   Lines/Drains/Airways     Drain                 Hemodialysis AV Graft Left upper arm -- days    Female External Urinary Catheter 02/25/20 1415 3 days          Airway                 Airway - Non-Surgical 02/03/20 1252 Nasal Cannula 25 days                Time spent on the discharge of patient: 43 minutes  Patient was seen and examined on the date of discharge and determined to be suitable for discharge.         Ramos Navarro DO  Department of Hospital Medicine  [facility]

## 2020-02-29 NOTE — DISCHARGE INSTRUCTIONS
Lung Disease, Chronic: If Oxygen Is Prescribed   Hemodialysis   Pain at Home, Managing Post-Op: Non-Medicine Relief   Pain, Managing Chronic: Medicines    Epoetin Natanael injection

## 2020-02-29 NOTE — NURSING
Removed Telemetry box from patient. Removed PIV from Right AC. No redness or swelling noted. Patient placed on home 02. Daughter at bedside.

## 2020-03-01 NOTE — PHYSICIAN QUERY
"PT Name: Katie Quevedo  MR #: [id number]    Physician Query Form - Heart  Condition Clarification     CDS/: Carmen Pelayo RN             Contact information: [phone]  This form is a permanent document in the medical record.     Query Date: March 1, 2020    By submitting this query, we are merely seeking further clarification of documentation. Please utilize your independent clinical judgment when addressing the question(s) below.    The medical record contains the following   Indicators     Supporting Clinical Findings Location in Medical Record   x BNP BNP= 521   2/23 lab   x EF EF= 55%   9/16/19  ECHO   x Radiology findings Persistent opacity at the left lung base likely relating to pleural fluid and atelectasis, the possibly of superimposed infiltrate is a consideration as well.  The overall appearance is thought stable without a large degree of interval change.   2/23 CXR   x Echo Results Normal left ventricular systolic function. The estimated ejection fraction is 55%    Grade II (moderate) left ventricular diastolic dysfunction consistent with pseudonormalization.   9/16/19  ECHO    "Ascites" documented     x "SOB" or "BERTRAND" documented She has rales (crackles bilaterally).    2/23 ED MD PN   x "Hypoxia" documented Acute on chronic respiratory failure with hypoxia   2/23 ED MD PN   x Heart Failure documented Chronic diastolic heart failure 2/27 Renal PN    "Edema" documented     x Diuretics/Meds Lasix 120mg IVP x 3 doses 2/24-25 Lab     x Treatment: Hemodialysis   Renal consult  Cards consult  BNP  CXR  Cardiac monitor  Pulse oximetry  Continuous oxygen   2/24-28 NSG orders   x Other:  Patient recently started on dialysis and has not dialyzed in 1 week since her dialysis access has not been functioning  Patient arrived on 15L non rebreather.      2/23 ED MD PN   Heart failure (HF) can be acute, chronic or both. It is generally further specificed as systolic, diastolic, or combined. Lastly, it is " important to identify an underlying etiology if known or suspected.     Common clues to acute exacerbation:  Rapidly progressive symptoms (w/in 2 weeks of presentation), using IV diuretics to treat, using supplemental O2, pulmonary edema on Xray, MI w/in 4 weeks, and/or BNP >500    Systolic Heart Failure: is defined as chart documentation of a left ventricular ejection fraction (LVEF) less than 40%     Diastolic Heart Failure: is defined as a left ventricular ejection fraction (LVEF) greater than 40%   +      Evidence of diastolic dysfunction on echocardiography OR    Right heart catheterization wedge pressure above 12 mm Hg OR    Left heart catheterization left ventricular end diastolic pressure 18 mm Hg or above.    References: *American Heart Association    The clinical guidelines noted below are only system guidelines, and do not replace the providers clinical judgment.     Provider, please specify the diagnosis associated with above clinical findings    [   ] Acute on Chronic Diastolic Heart Failure -    Pre-existing diastoic HF diagnosis.  EF > 40%  and acute HF symptoms documented         [X   ] Chronic Diastolic Heart Failure - Pre-existing diastolic HF diagnosis.  EF > 40%  without  acute HF symptoms documented     [   ] Other Type of Heart Failure (please specify type):     [   ] Other (please specify):     [  ] Clinically Undetermined                           Please document in your progress notes daily for the duration of treatment until resolved and include in your discharge summary.

## 2020-03-01 NOTE — PHYSICIAN QUERY
PT Name: Katie Quevedo  MR #: [id number]    Physician Query Form - Respiratory Condition Clarification      CDS/: Carmen Pelayo RN              Contact information:  [phone]    This form is a permanent document in the medical record.    Query Date: March 1, 2020    By submitting this query, we are merely seeking further clarification of documentation. Please utilize your independent clinical judgment when addressing the question(s) below.    The Medical record contains the following   Indicators   Supporting Clinical Findings Location in Medical Record   x SOB, BERTRAND, Wheezing, Productive Cough, Use of Accessory Muscles, etc. Tachypnea noted. No respiratory distress. She has wheezes. She has rales.   Breath sounds diminished throughout      SOB. Somnolent 2/24 HP        2/24 Renal consult     x Acute/Chronic Illness Opoid overdose  Acute encephalopathy  Hyperkalemia  ME  Chronic respiratory failure   2/28 DC summary   x Radiology Findings Persistent opacity at the left lung base likely relating to pleural fluid and atelectasis, the possibly of superimposed infiltrate is a consideration as well.  The overall appearance is thought stable without a large degree of interval change.   2/23 CXR   x Respiratory Distress or Failure Acute on chronic respiratory failure with hypoxia  Chronic respiratory failure with hypoxia, on Home O2       Chronic respiratory failure with hypoxia, on home O2 therapy      Acute on chronic hypercapnic and hypoxic respiratory failure  On high flow nc 25L/min, 50%   2/23 ED MD PN        2/24 HP      2/24 CC MD PN    Hypoxia or Hypercapnia     x RR         ABGs         O2 sat   POC PH 7.265 (LL)   POC PCO2 65.7 (HH)   POC PO2 42 (LL)   POC SATURATED O2 68 (L)       O2 sats 93% on Nonrebreather mask   2/24 ABGs on 2L NC                2/23 Flowsheet    BiPAP/Intubation     x Supplemental O2 O2 15L nonrebreather mask  O2 30- 50% Vapotherm   2/23-25 Flowsheet   x Home O2, Oxygen Dependence  2lpm O2 is worn at home   2/24 RRT flowsheet   x Treatment Continuous oxygen  Pulse oximetry  Cardiac monitor  Hemodialysis  Renal consult    Lasix 120mg IVP x 3 doses  Albuterol neb  Narcan   2/25 NSG orders            2/24-25 MAR     x Other has not received dialysis in one week. Patient arrived on 15L non rebreather.     She has rales (crackles bilaterally).    2/23 ED MD DÍAZ     Respiratory failure can be acute, chronic or both. It is generally further specified as hypoxic, hypercapnic or both. Lastly, it is important to identify an etiology, if known or suspected.   References::  https://www.acphospitalist.org/archives/2013/10/coding.htm http://[place]/acute-respiratory-failure-know     The clinical guidelines noted below are only system guidelines, and do not replace the providers clinical judgment.    Provider, please specify diagnosis or diagnoses associated with above clinical findings.     Please clarify conflicting diagnosis (Acuity and type)  as it relates to Respiratory failure:    [   ] Acute and (on) Chronic Respiratory Failure with Hypoxia -   pO2 >10 mmHg below baseline OR SpO2 < 91% on usual home O2 OR O2 > 2L/min over baseline home O2      [   ] Acute and (on) Chronic Respiratory Failure with Hypercapnia -   pCO2 >10 mmHg over baseline and pH < 7.35     [   ] Acute and (on) Chronic Respiratory Failure with Hypoxia and Hypercapnia -   Hypoxic: pO2 >10 mmHg below baseline OR SpO2 < 91% on usual home O2 OR O2 > 2L/min over baseline home O2 and Hypercapnic:  pCO2 >10 mmHg over baseline and pH < 7.35      [   ] Other Acute and (on) Chronic Respiratory Failure      [   ] Chronic Respiratory Failure with Hypoxia -Continuous home oxygen use     [   ] Chronic Respiratory Failure with Hypercapnia -   Normal pH with high pCO2 (chronic hypercapnia) (common example: COPD)     [   ] Chronic Respiratory Failure with Hypoxia and Hypercapnia -   Continuous home oxygen and Normal pH with high pCO2  (chronic hypercapnia) (common example: COPD)     [   ] Other Chronic Respiratory Failure     [   ] Other Respiratory Diagnosis (please specify): _________________________________     [   ]  Clinically Undetermined       Please document in your progress notes daily for the duration of treatment until resolved and include in your discharge summary.

## 2020-03-01 NOTE — PHYSICIAN QUERY
"PT Name: Katie Quevedo  MR #: [id number]    Physician Query Form - Nutrition Clarification     CDS/: Carmen Pelayo RN            Contact information: [phone]    This form is a permanent document in the medical record.     Query Date: March 1, 2020    By submitting this query, we are merely seeking further clarification of documentation.. Please utilize your independent clinical judgment when addressing the question(s) below.    The Medical record contains the following:   Indicators  Supporting Clinical Findings Location in Medical Record   x % of Estimated Energy Intake over a time frame from p.o., TF, or TPN Anorexia    Poor nutrition   2/26 Palliative consult    2/27 Renal PN    Weight Status over a time frame      Subcutaneous Fat and/or Muscle Loss      Fluid Accumulation or Edema      Reduced  Strength     x Wt / BMI / Usual Body Weight BMI= 18.6  Wt= 50.7kg  Ht= 5'5" 2/25 Flowsheet    Delayed Wound Healing / Failure to Thrive     x Acute or Chronic Illness MHx of COPD on 2 liters home oxygen at baseline, chronic pain, lumbar spondylosis, vitamin D deficiency dx, anemia, migraines, osteoarthritis, HTN,  PE with infarction, CKD stage IV, seizures, lumbar spondylosis, pulmonary embolism and acute CHF, who presents 2 hours history of AMS  Opoid overdose  Acute encephalopathy  Hyperkalemia  ME  Chronic respiratory failure   2/28 DC summary   x Medication Discharge medicaions:  Continue taking   Cyproheptadine 4mg every 8 hours  (Pericatin)    Home medications:  cyproheptadine (PERIACTIN) 4 mg tablet   2/28 DC summary            2/27 HP   x Treatment Dietary nutrition supplements:  novasource   Intake and output  Daily weight   2/25 NSG orders    Other       AND / ASPEN Clinical Characteristics (October 2011)  A minimum of two characteristics is recommended for diagnosing either moderate or severe malnutrition   Mild Malnutrition Moderate Malnutrition Severe Malnutrition   Energy Intake from p.o., TF " or TPN. < 75% intake of estimated energy needs for less than 7 days < 75% intake of estimated energy needs for greater than 7 days < 50% intake of estimated energy needs for > 5 days   Weight Loss 1-2% in 1 month  5% in 3 months  7.5% in 6 months  10% in 1 year 1-2 % in 1 week  5% in 1 month  7.5% in 3 months  10% in 6 months  20% in 1 year > 2% in 1 week  > 5% in 1 month  > 7.5% in 3 months  > 10% in 6 months  > 20% in 1 year   Physical Findings     None *Mild subcutaneous fat and/or muscle loss  *Mild fluid accumulation  *Stage II decubitus  *Surgical wound or non-healing wound *Mod/severe subcutaneous fat and/or muscle loss  *Mod/severe fluid accumulation  *Stage III or IV decubitus  *Non-healing surgical wound     Provider, please specify diagnosis or diagnoses associated with above clinical findings.    [ X ] Mild Protein-Calorie Malnutrition   [  ] Underweight   [  ] Other Nutritional Diagnosis (please specify):    [  ] Other:    [  ] Clinically Undetermined       Please document in your progress notes daily for the duration of treatment until resolved and include in your discharge summary.

## 2020-03-02 LAB — INTERPRETATION SERPL IFE-IMP: NORMAL

## 2020-03-02 NOTE — PLAN OF CARE
Future Appointments   Date Time Provider Department Center   3/2/2020  2:00 PM Lindsey Louie MD [facility]   3/10/2020 11:45 AM Aura Diggs MD [facility]   3/10/2020  3:00 PM Clemencia Gambino MD [facility]   3/16/2020  2:40 PM Pushpa Mcmahon MD [facility]       Pt requires no HH or DME at this time.  Will return to [facility] for regular chair time.       03/02/20 0757   Final Note   Assessment Type Final Discharge Note   Anticipated Discharge Disposition Home   Hospital Follow Up  Appt(s) scheduled? Yes   Discharge plans and expectations educations in teach back method with documentation complete? Yes   Right Care Referral Info   Post Acute Recommendation No Care       Charmaine Tom, RN    [phone]

## 2020-03-03 LAB — PATHOLOGIST INTERPRETATION IFE: NORMAL

## 2020-03-04 LAB — PTH RELATED PROT SERPL-SCNC: 1.1 PMOL/L

## 2020-03-04 NOTE — PHYSICIAN QUERY
PT Name: Katie Quevedo  MR #: [id number]    Physician Query Form - Respiratory Condition Clarification      CDS/: Carmen Pelayo RN              Contact information:  [phone]    This form is a permanent document in the medical record.    Query Date: March 4, 2020    By submitting this query, we are merely seeking further clarification of documentation. Please utilize your independent clinical judgment when addressing the question(s) below.    The Medical record contains the following   Indicators   Supporting Clinical Findings Location in Medical Record   x SOB, BERTRAND, Wheezing, Productive Cough, Use of Accessory Muscles, etc. Tachypnea noted. No respiratory distress. She has wheezes. She has rales.   Breath sounds diminished throughout      SOB. Somnolent 2/24 HP        2/24 Renal consult     x Acute/Chronic Illness Opoid overdose  Acute encephalopathy  Hyperkalemia  ME  Chronic respiratory failure   2/28 DC summary   x Radiology Findings Persistent opacity at the left lung base likely relating to pleural fluid and atelectasis, the possibly of superimposed infiltrate is a consideration as well.  The overall appearance is thought stable without a large degree of interval change.   2/23 CXR   x Respiratory Distress or Failure Acute on chronic respiratory failure with hypoxia  Chronic respiratory failure with hypoxia, on Home O2       Chronic respiratory failure with hypoxia, on home O2 therapy      Acute on chronic hypercapnic and hypoxic respiratory failure  On high flow nc 25L/min, 50%   2/23 ED MD PN        2/24 HP      2/24 CC MD PN    Hypoxia or Hypercapnia     x RR         ABGs         O2 sat   POC PH 7.265 (LL)   POC PCO2 65.7 (HH)   POC PO2 42 (LL)   POC SATURATED O2 68 (L)       O2 sats 93% on Nonrebreather mask   2/24 ABGs on 2L NC                2/23 Flowsheet    BiPAP/Intubation     x Supplemental O2 O2 15L nonrebreather mask  O2 30- 50% Vapotherm   2/23-25 Flowsheet   x Home O2, Oxygen Dependence  2lpm O2 is worn at home   2/24 RRT flowsheet   x Treatment Continuous oxygen  Pulse oximetry  Cardiac monitor  Hemodialysis  Renal consult    Lasix 120mg IVP x 3 doses  Albuterol neb  Narcan   2/25 NSG orders            2/24-25 MAR     x Other has not received dialysis in one week. Patient arrived on 15L non rebreather.     She has rales (crackles bilaterally).    2/23 ED MD DÍAZ     Respiratory failure can be acute, chronic or both. It is generally further specified as hypoxic, hypercapnic or both. Lastly, it is important to identify an etiology, if known or suspected.   References::  https://www.acphospitalist.org/archives/2013/10/coding.htm http://[employer]/acute-respiratory-failure-know     The clinical guidelines noted below are only system guidelines, and do not replace the providers clinical judgment.    Provider, please specify diagnosis or diagnoses associated with above clinical findings.     Please clarify conflicting diagnosis (Acuity and type)  as it relates to Respiratory failure:    [ X  ] Acute and (on) Chronic Respiratory Failure with Hypoxia -   pO2 >10 mmHg below baseline OR SpO2 < 91% on usual home O2 OR O2 > 2L/min over baseline home O2      [   ] Acute and (on) Chronic Respiratory Failure with Hypercapnia -   pCO2 >10 mmHg over baseline and pH < 7.35     [   ] Acute and (on) Chronic Respiratory Failure with Hypoxia and Hypercapnia -   Hypoxic: pO2 >10 mmHg below baseline OR SpO2 < 91% on usual home O2 OR O2 > 2L/min over baseline home O2 and Hypercapnic:  pCO2 >10 mmHg over baseline and pH < 7.35      [   ] Other Acute and (on) Chronic Respiratory Failure      [   ] Chronic Respiratory Failure with Hypoxia -Continuous home oxygen use     [   ] Chronic Respiratory Failure with Hypercapnia -   Normal pH with high pCO2 (chronic hypercapnia) (common example: COPD)     [   ] Chronic Respiratory Failure with Hypoxia and Hypercapnia -   Continuous home oxygen and Normal pH with high pCO2  (chronic hypercapnia) (common example: COPD)     [   ] Other Chronic Respiratory Failure     [   ] Other Respiratory Diagnosis (please specify): _________________________________     [   ]  Clinically Undetermined       Please document in your progress notes daily for the duration of treatment until resolved and include in your discharge summary.

## 2020-03-05 ENCOUNTER — TELEPHONE (OUTPATIENT)
Dept: PRIMARY CARE CLINIC | Facility: CLINIC | Age: 75
End: 2020-03-05

## 2020-03-05 DIAGNOSIS — F41.9 ANXIETY: ICD-10-CM

## 2020-03-05 RX ORDER — CLONAZEPAM 1 MG/1
1 TABLET ORAL 2 TIMES DAILY PRN
Qty: 28 TABLET | Refills: 0 | OUTPATIENT
Start: 2020-03-05 | End: 2021-03-05

## 2020-03-05 NOTE — TELEPHONE ENCOUNTER
Spoke to patient and notified that per Dr. Gambino she cannot refill her klonopin. This medication was not prescribed to be long term. Patient was upset with this information and requested a call from the doctor.

## 2020-03-06 ENCOUNTER — HOSPITAL ENCOUNTER (EMERGENCY)
Facility: HOSPITAL | Age: 75
Discharge: HOME | End: 2020-03-06
Attending: EMERGENCY MEDICINE
Payer: MEDICARE

## 2020-03-06 VITALS
DIASTOLIC BLOOD PRESSURE: 68 MMHG | WEIGHT: 121.5 LBS | HEIGHT: 62 IN | BODY MASS INDEX: 22.36 KG/M2 | OXYGEN SATURATION: 94 % | RESPIRATION RATE: 24 BRPM | SYSTOLIC BLOOD PRESSURE: 133 MMHG | TEMPERATURE: 99 F | HEART RATE: 96 BPM

## 2020-03-06 DIAGNOSIS — N18.6 ESRD (END STAGE RENAL DISEASE) ON DIALYSIS: Primary | Chronic | ICD-10-CM

## 2020-03-06 DIAGNOSIS — Z99.2 ESRD (END STAGE RENAL DISEASE) ON DIALYSIS: Primary | Chronic | ICD-10-CM

## 2020-03-06 DIAGNOSIS — Z78.9 PROBLEM WITH VASCULAR ACCESS: ICD-10-CM

## 2020-03-06 LAB
ANION GAP SERPL CALC-SCNC: 10 MMOL/L (ref 8–16)
APTT BLDCRRT: 27.8 SEC (ref 21–32)
BASOPHILS # BLD AUTO: 0.05 K/UL (ref 0–0.2)
BASOPHILS NFR BLD: 0.6 % (ref 0–1.9)
BUN SERPL-MCNC: 18 MG/DL (ref 8–23)
CALCIUM SERPL-MCNC: 9.5 MG/DL (ref 8.7–10.5)
CHLORIDE SERPL-SCNC: 92 MMOL/L (ref 95–110)
CO2 SERPL-SCNC: 36 MMOL/L (ref 23–29)
CREAT SERPL-MCNC: 2.1 MG/DL (ref 0.5–1.4)
DIFFERENTIAL METHOD: ABNORMAL
EOSINOPHIL # BLD AUTO: 0.4 K/UL (ref 0–0.5)
EOSINOPHIL NFR BLD: 4.2 % (ref 0–8)
ERYTHROCYTE [DISTWIDTH] IN BLOOD BY AUTOMATED COUNT: 18.1 % (ref 11.5–14.5)
EST. GFR  (AFRICAN AMERICAN): 26 ML/MIN/1.73 M^2
EST. GFR  (NON AFRICAN AMERICAN): 22 ML/MIN/1.73 M^2
GLUCOSE SERPL-MCNC: 113 MG/DL (ref 70–110)
HCT VFR BLD AUTO: 33.3 % (ref 37–48.5)
HGB BLD-MCNC: 9.8 G/DL (ref 12–16)
IMM GRANULOCYTES # BLD AUTO: 0.04 K/UL (ref 0–0.04)
IMM GRANULOCYTES NFR BLD AUTO: 0.5 % (ref 0–0.5)
INR PPP: 1 (ref 0.8–1.2)
LYMPHOCYTES # BLD AUTO: 1.2 K/UL (ref 1–4.8)
LYMPHOCYTES NFR BLD: 14 % (ref 18–48)
MCH RBC QN AUTO: 27.6 PG (ref 27–31)
MCHC RBC AUTO-ENTMCNC: 29.4 G/DL (ref 32–36)
MCV RBC AUTO: 94 FL (ref 82–98)
MONOCYTES # BLD AUTO: 0.7 K/UL (ref 0.3–1)
MONOCYTES NFR BLD: 7.9 % (ref 4–15)
NEUTROPHILS # BLD AUTO: 6.1 K/UL (ref 1.8–7.7)
NEUTROPHILS NFR BLD: 72.8 % (ref 38–73)
NRBC BLD-RTO: 0 /100 WBC
PLATELET # BLD AUTO: 248 K/UL (ref 150–350)
PMV BLD AUTO: 9.7 FL (ref 9.2–12.9)
POTASSIUM SERPL-SCNC: 3.2 MMOL/L (ref 3.5–5.1)
PROTHROMBIN TIME: 11 SEC (ref 9–12.5)
RBC # BLD AUTO: 3.55 M/UL (ref 4–5.4)
SODIUM SERPL-SCNC: 138 MMOL/L (ref 136–145)
WBC # BLD AUTO: 8.43 K/UL (ref 3.9–12.7)

## 2020-03-06 PROCEDURE — 80048 BASIC METABOLIC PNL TOTAL CA: CPT | Mod: HCNC

## 2020-03-06 PROCEDURE — 85025 COMPLETE CBC W/AUTO DIFF WBC: CPT | Mod: HCNC

## 2020-03-06 PROCEDURE — 85610 PROTHROMBIN TIME: CPT | Mod: HCNC

## 2020-03-06 PROCEDURE — 25000003 PHARM REV CODE 250: Mod: HCNC | Performed by: EMERGENCY MEDICINE

## 2020-03-06 PROCEDURE — 99284 EMERGENCY DEPT VISIT MOD MDM: CPT | Mod: 25,HCNC

## 2020-03-06 PROCEDURE — 85730 THROMBOPLASTIN TIME PARTIAL: CPT | Mod: HCNC

## 2020-03-06 RX ORDER — FUROSEMIDE 40 MG/1
80 TABLET ORAL
Status: COMPLETED | OUTPATIENT
Start: 2020-03-06 | End: 2020-03-06

## 2020-03-06 RX ORDER — FUROSEMIDE 10 MG/ML
40 INJECTION INTRAMUSCULAR; INTRAVENOUS ONCE
Status: DISCONTINUED | OUTPATIENT
Start: 2020-03-06 | End: 2020-03-06

## 2020-03-06 RX ADMIN — FUROSEMIDE 80 MG: 40 TABLET ORAL at 07:03

## 2020-03-06 NOTE — ED PROVIDER NOTES
Encounter Date: 3/6/2020    SCRIBE #1 NOTE: I, Jemima Ceballos, am scribing for, and in the presence of,  Dr. Foley . I have scribed the entire note.       History     Chief Complaint   Patient presents with    Vascular Access Problem     pt reports that she went to dialysis today and they said her access was clotted off, referred to ED via Dr Diggs nephrology; access to LUE without thrill/bruit noted      Pt is a 74 y.o. female w/h/o COPD, HTN, osteoporosis, CKD and seizures, who presents for vascular access problem. Pt reports she is on dialysis (MWF) and while receiving treatment prior to arrival, her AV fistula was found to be clotted off. Pt referred to the ED by Dr. Diggs (nephrology) for evaluation. Last dialysis treatment noted on 3/4/2020. Pt notes a past history of similar symptoms. She denies any SOB or any other concerns at this time.    The history is provided by the patient and the spouse.     Review of patient's allergies indicates:   Allergen Reactions    Aspirin      Other reaction(s): hx of ulcers    Tetracycline Swelling     Other reaction(s): Swelling    Penicillins Rash     Other reaction(s): Hives  Other reaction(s): Rash  Other reaction(s): Rash  Other reaction(s): Hives     Past Medical History:   Diagnosis Date    Acute congestive heart failure 2/10/2020    Anemia     Bilateral renal cysts     Cataract     Chronic LBP 7/26/2012    Chronic pain     CKD (chronic kidney disease), stage IV     COPD (chronic obstructive pulmonary disease)     Dehydration     Encounter for blood transfusion     HTN (hypertension)     Lumbar spondylosis     Melanoma     of the lip    Metabolic bone disease     Migraines, neuralgic     Osteoporosis     Primary osteoarthritis of both knees     s/p Rt TKA    Pulmonary embolism with infarction     Seizures 1972    x1 only    Subdeltoid bursitis, L>R. 9/27/2012    Ulcer     Vitamin D deficiency disease      Past Surgical History:   Procedure  Laterality Date    BLADDER SUSPENSION      CATARACT EXTRACTION  11/18/13    left eye    CERVICAL LAMINECTOMY      x3, fusion x1    COLONOSCOPY  2009    FISTULOGRAM N/A 2/27/2020    Procedure: Fistulogram;  Surgeon: Noe Benitez MD;  Location: [facility] CATH LAB/EP;  Service: Cardiology;  Laterality: N/A;    HYSTERECTOMY      JOINT REPLACEMENT  2001    total right knee     LUMBAR LAMINECTOMY      x 3, fusion x1    OOPHORECTOMY      PLACEMENT OF ARTERIOVENOUS GRAFT Left 1/21/2020    Procedure: INSERTION, GRAFT, ARTERIOVENOUS;  Surgeon: Lindsey Louie MD;  Location: [facility] OR;  Service: General;  Laterality: Left;    THROMBECTOMY Left 2/3/2020    Procedure: THROMBECTOMY;  Surgeon: Lindsey Louie MD;  Location: [facility] OR;  Service: General;  Laterality: Left;     Family History   Problem Relation Age of Onset    Arthritis Mother     Stroke Mother     Hypertension Father     Cancer Father     Cataracts Father     Diabetes Maternal Aunt     Hypertension Maternal Grandfather     Heart disease Maternal Grandfather     Heart attack Maternal Grandfather     Cataracts Sister     Glaucoma Cousin      Social History     Tobacco Use    Smoking status: Former Smoker     Packs/day: 1.00     Types: Cigarettes    Smokeless tobacco: Former User     Quit date: 2/3/2015   Substance Use Topics    Alcohol use: Not Currently     Comment: Rare    Drug use: No     Review of Systems  General: No fever.  No chills.  Eyes: No visual changes.  Head: No headache.    Integument: No rashes or lesions.  Chest: No shortness of breath.  Cardiovascular: No chest pain.  Abdomen: No abdominal pain.  No nausea or vomiting.  Urinary: No abnormal urination.  Neurologic: No focal weakness.  No numbness.  Hematologic: No easy bruising.  Endocrine: No excessive thirst or urination.     Physical Exam     Initial Vitals [03/06/20 1450]   BP Pulse Resp Temp SpO2   (!) 116/56 105 20 98.6 °F (37 °C) 100 %      MAP       --          Physical Exam   Appearance: No acute distress.  HEENT: Normocephalic. Atraumatic. No conjunctival injection. EOMI. PERRL. .    Neck: No JVD No LN. Neck supple.    Chest: Non-tender. Clear to auscultation bilaterally.  Good air movement.  No wheezes.  No rhonchi.  Cardiovascular: Regular rate and rhythm. No murmurs. No gallops. No rubs. +2 radial/DP/DT pulses bilaterally.  Left upper extremity AV fistula with no palpable thrill. No audible bruit.  Abdomen: Soft. Not distended. Nontender. No guarding. No rebound. No Masses  Musculoskeletal: Good range of motion all joints. No deformities.    Neurologic: Alert and oriented x 3.  Equal strength in upper and lower extremities bilaterally. Normal sensation. No facial droop. Normal speech.    Psych:  Appropriate, conversant.   Integumentary: No rashes seen.  Good turgor.  No abrasions.  No ecchymoses.     ED Course   Procedures  Labs Reviewed   CBC W/ AUTO DIFFERENTIAL - Abnormal; Notable for the following components:       Result Value    RBC 3.55 (*)     Hemoglobin 9.8 (*)     Hematocrit 33.3 (*)     Mean Corpuscular Hemoglobin Conc 29.4 (*)     RDW 18.1 (*)     Lymph% 14.0 (*)     All other components within normal limits   BASIC METABOLIC PANEL - Abnormal; Notable for the following components:    Potassium 3.2 (*)     Chloride 92 (*)     CO2 36 (*)     Glucose 113 (*)     Creatinine 2.1 (*)     eGFR if  26 (*)     eGFR if non  22 (*)     All other components within normal limits   APTT   PROTIME-INR          Imaging Results           US Hemodialysis Access (Final result)  Result time 03/06/20 17:14:35    Final result by Rufina Hampton MD (03/06/20 17:14:35)                 Impression:      Occlusion of the proximal AVG, to distal ABG, distal anastomosis, and basilic outflow.   This is new in comparison to the prior exam.    This report was flagged in Epic as abnormal.    Electronically signed by resident: Vijay  Denver  Date:    03/06/2020  Time:    17:06    Electronically signed by: Rufina Hampton MD  Date:    03/06/2020  Time:    17:14             Narrative:    EXAMINATION:  US HEMODIALYSIS ACCESS    CLINICAL HISTORY:  concern for thrombosed AV fistula on LUE;    TECHNIQUE:  US dialysis access 02/24/2020    COMPARISON:  02/24/2020    FINDINGS:  Occlusion of the proximal AVG, mid AVG, distal AVG, distal anastomosis, and basilic outflow.    Axillary vein and subclavian vein are patent.    Inflow brachial artery and proximal anastomosis is patent.                                                                 Clinical Impression:     1. ESRD (end stage renal disease) on dialysis    2. Problem with vascular access      75 yo female presents to ED from HD for AV-fistula thrombosis. Exam shows VSS, afeb. AV without bruit or thrill. Labs reassuring. Discussed with Dr Diggs and Dr. Benitez. Dr. Benitez schedule an appointment for her before her Monday HD at 2:30 pm.    Discussed results, diagnosis, and treatment plan with pt; advised close follow-up with PCP. Reviewed strict return precautions. Pt confirms understanding and ability to comply.    I, Dr. Ramonita Foley, personally performed the services described in this documentation. All medical record entries made by the scribe were at my direction and in my presence.  I have reviewed the chart and agree that the record reflects my personal performance and is accurate and complete. Ramonita Foley MD.  9:20 PM 03/06/2020            ED Disposition Condition    Discharge Stable        ED Prescriptions     None        Follow-up Information     Follow up With Specialties Details Why Contact Info    Noe Benitez MD INTERVENTIONAL CARDIOLOGY, Cardiology Go on 3/9/2020  [address]  [address]  [place] [address]  [phone]                                       Ramonita Foley MD  03/06/20 9933

## 2020-03-06 NOTE — ED NOTES
Pt c/o her dialysis access in clotted off. Pt went to dialysis and was not able to be dialyzed. Pt is A & O x 3, denies SOB, fever, chills and N/V/D. Skin is warm, dry and pink. VS. MINGO x 3mm. BBS- CTA. Abd- SNT. PSM x 4 exts. Pt is connected to the pulse ox, B/P cuff and EKG monitor. Bed is locked and in the low position w/ the side rails up and locked for pt safety. Call bell @ the BS. Will continue to monitor closely.

## 2020-03-08 ENCOUNTER — NURSE TRIAGE (OUTPATIENT)
Dept: ADMINISTRATIVE | Facility: CLINIC | Age: 75
End: 2020-03-08

## 2020-03-08 DIAGNOSIS — T82.590S DIALYSIS AV FISTULA MALFUNCTION, SEQUELA: Primary | ICD-10-CM

## 2020-03-08 RX ORDER — DIPHENHYDRAMINE HCL 25 MG
50 CAPSULE ORAL ONCE
Status: CANCELLED | OUTPATIENT
Start: 2020-03-09 | End: 2020-03-09

## 2020-03-09 ENCOUNTER — HOSPITAL ENCOUNTER (OUTPATIENT)
Facility: HOSPITAL | Age: 75
Discharge: HOME | End: 2020-03-09
Attending: INTERNAL MEDICINE | Admitting: INTERNAL MEDICINE
Payer: MEDICARE

## 2020-03-09 VITALS
SYSTOLIC BLOOD PRESSURE: 127 MMHG | DIASTOLIC BLOOD PRESSURE: 79 MMHG | HEART RATE: 97 BPM | WEIGHT: 121 LBS | RESPIRATION RATE: 15 BRPM | TEMPERATURE: 98 F | OXYGEN SATURATION: 98 % | BODY MASS INDEX: 21.95 KG/M2

## 2020-03-09 DIAGNOSIS — T82.590S DIALYSIS AV FISTULA MALFUNCTION, SEQUELA: ICD-10-CM

## 2020-03-09 DIAGNOSIS — I10 HTN (HYPERTENSION): ICD-10-CM

## 2020-03-09 LAB
ANION GAP SERPL CALC-SCNC: 14 MMOL/L (ref 8–16)
BUN SERPL-MCNC: 15 MG/DL (ref 8–23)
CALCIUM SERPL-MCNC: 10.3 MG/DL (ref 8.7–10.5)
CHLORIDE SERPL-SCNC: 93 MMOL/L (ref 95–110)
CO2 SERPL-SCNC: 31 MMOL/L (ref 23–29)
CREAT SERPL-MCNC: 2.2 MG/DL (ref 0.5–1.4)
ERYTHROCYTE [DISTWIDTH] IN BLOOD BY AUTOMATED COUNT: 17.9 % (ref 11.5–14.5)
EST. GFR  (AFRICAN AMERICAN): 24 ML/MIN/1.73 M^2
EST. GFR  (NON AFRICAN AMERICAN): 21 ML/MIN/1.73 M^2
GLUCOSE SERPL-MCNC: 83 MG/DL (ref 70–110)
HCT VFR BLD AUTO: 35.2 % (ref 37–48.5)
HGB BLD-MCNC: 10.5 G/DL (ref 12–16)
MCH RBC QN AUTO: 27.1 PG (ref 27–31)
MCHC RBC AUTO-ENTMCNC: 29.8 G/DL (ref 32–36)
MCV RBC AUTO: 91 FL (ref 82–98)
PLATELET # BLD AUTO: 344 K/UL (ref 150–350)
PMV BLD AUTO: 9.9 FL (ref 9.2–12.9)
POTASSIUM SERPL-SCNC: 3.6 MMOL/L (ref 3.5–5.1)
RBC # BLD AUTO: 3.87 M/UL (ref 4–5.4)
SODIUM SERPL-SCNC: 138 MMOL/L (ref 136–145)
WBC # BLD AUTO: 9.42 K/UL (ref 3.9–12.7)

## 2020-03-09 PROCEDURE — 93005 ELECTROCARDIOGRAM TRACING: CPT | Mod: HCNC

## 2020-03-09 PROCEDURE — 80048 BASIC METABOLIC PNL TOTAL CA: CPT | Mod: HCNC

## 2020-03-09 PROCEDURE — 93010 ELECTROCARDIOGRAM REPORT: CPT | Mod: HCNC,,, | Performed by: INTERNAL MEDICINE

## 2020-03-09 PROCEDURE — C1769 GUIDE WIRE: HCPCS | Mod: HCNC | Performed by: INTERNAL MEDICINE

## 2020-03-09 PROCEDURE — 36906 THRMBC/NFS DIALYSIS CIRCUIT: CPT | Mod: HCNC,LT,, | Performed by: INTERNAL MEDICINE

## 2020-03-09 PROCEDURE — C1874 STENT, COATED/COV W/DEL SYS: HCPCS | Mod: HCNC | Performed by: INTERNAL MEDICINE

## 2020-03-09 PROCEDURE — 36906 PR MECH THROMBECTOMY/INFUS, DIALYSIS CIRCUIT W/TRANSCATH PLCMNT, STENT: ICD-10-PCS | Mod: HCNC,LT,, | Performed by: INTERNAL MEDICINE

## 2020-03-09 PROCEDURE — 99152 PR MOD CONSCIOUS SEDATION, SAME PHYS, 5+ YRS, FIRST 15 MIN: ICD-10-PCS | Mod: ,,, | Performed by: INTERNAL MEDICINE

## 2020-03-09 PROCEDURE — 99152 MOD SED SAME PHYS/QHP 5/>YRS: CPT | Mod: ,,, | Performed by: INTERNAL MEDICINE

## 2020-03-09 PROCEDURE — 63600175 PHARM REV CODE 636 W HCPCS: Mod: HCNC | Performed by: INTERNAL MEDICINE

## 2020-03-09 PROCEDURE — C1894 INTRO/SHEATH, NON-LASER: HCPCS | Mod: HCNC | Performed by: INTERNAL MEDICINE

## 2020-03-09 PROCEDURE — 85027 COMPLETE CBC AUTOMATED: CPT | Mod: HCNC

## 2020-03-09 PROCEDURE — 25000003 PHARM REV CODE 250: Mod: HCNC | Performed by: INTERNAL MEDICINE

## 2020-03-09 PROCEDURE — 93010 EKG 12-LEAD: ICD-10-PCS | Mod: HCNC,,, | Performed by: INTERNAL MEDICINE

## 2020-03-09 PROCEDURE — 37252 PR IVUS, NON-CORONARY, 1ST VESSEL: ICD-10-PCS | Mod: HCNC,LT,, | Performed by: INTERNAL MEDICINE

## 2020-03-09 PROCEDURE — 27201423 OPTIME MED/SURG SUP & DEVICES STERILE SUPPLY: Mod: HCNC | Performed by: INTERNAL MEDICINE

## 2020-03-09 PROCEDURE — 37252 INTRVASC US NONCORONARY 1ST: CPT | Mod: HCNC | Performed by: INTERNAL MEDICINE

## 2020-03-09 PROCEDURE — 36906 THRMBC/NFS DIALYSIS CIRCUIT: CPT | Mod: HCNC | Performed by: INTERNAL MEDICINE

## 2020-03-09 PROCEDURE — C1757 CATH, THROMBECTOMY/EMBOLECT: HCPCS | Mod: HCNC | Performed by: INTERNAL MEDICINE

## 2020-03-09 PROCEDURE — 37252 INTRVASC US NONCORONARY 1ST: CPT | Mod: HCNC,LT,, | Performed by: INTERNAL MEDICINE

## 2020-03-09 PROCEDURE — C1725 CATH, TRANSLUMIN NON-LASER: HCPCS | Mod: HCNC | Performed by: INTERNAL MEDICINE

## 2020-03-09 PROCEDURE — C1887 CATHETER, GUIDING: HCPCS | Mod: HCNC | Performed by: INTERNAL MEDICINE

## 2020-03-09 PROCEDURE — 25500020 PHARM REV CODE 255: Mod: HCNC | Performed by: INTERNAL MEDICINE

## 2020-03-09 PROCEDURE — 36415 COLL VENOUS BLD VENIPUNCTURE: CPT | Mod: HCNC

## 2020-03-09 DEVICE — COVERA™ VASCULAR COVERED STENT  6 MM X 80 MM (80 CM DELIVERY CATHETER) (FLARED)
Type: IMPLANTABLE DEVICE | Site: ARM | Status: FUNCTIONAL
Brand: COVERA

## 2020-03-09 RX ORDER — MIDAZOLAM HYDROCHLORIDE 1 MG/ML
INJECTION INTRAMUSCULAR; INTRAVENOUS
Status: DISCONTINUED | OUTPATIENT
Start: 2020-03-09 | End: 2020-03-09 | Stop reason: HOSPADM

## 2020-03-09 RX ORDER — HEPARIN SODIUM 1000 [USP'U]/ML
INJECTION, SOLUTION INTRAVENOUS; SUBCUTANEOUS
Status: DISCONTINUED | OUTPATIENT
Start: 2020-03-09 | End: 2020-03-09 | Stop reason: HOSPADM

## 2020-03-09 RX ORDER — IODIXANOL 320 MG/ML
INJECTION, SOLUTION INTRAVASCULAR
Status: DISCONTINUED | OUTPATIENT
Start: 2020-03-09 | End: 2020-03-09 | Stop reason: HOSPADM

## 2020-03-09 RX ORDER — FENTANYL CITRATE 50 UG/ML
INJECTION, SOLUTION INTRAMUSCULAR; INTRAVENOUS
Status: DISCONTINUED | OUTPATIENT
Start: 2020-03-09 | End: 2020-03-09 | Stop reason: HOSPADM

## 2020-03-09 RX ORDER — HEPARIN SODIUM 200 [USP'U]/100ML
INJECTION, SOLUTION INTRAVENOUS
Status: DISCONTINUED | OUTPATIENT
Start: 2020-03-09 | End: 2020-03-09 | Stop reason: HOSPADM

## 2020-03-09 RX ORDER — HYDROMORPHONE HYDROCHLORIDE 1 MG/ML
INJECTION, SOLUTION INTRAMUSCULAR; INTRAVENOUS; SUBCUTANEOUS
Status: DISCONTINUED | OUTPATIENT
Start: 2020-03-09 | End: 2020-03-09 | Stop reason: HOSPADM

## 2020-03-09 RX ORDER — DIPHENHYDRAMINE HYDROCHLORIDE 50 MG/ML
INJECTION INTRAMUSCULAR; INTRAVENOUS
Status: DISCONTINUED | OUTPATIENT
Start: 2020-03-09 | End: 2020-03-09 | Stop reason: HOSPADM

## 2020-03-09 RX ORDER — DIPHENHYDRAMINE HCL 25 MG
50 CAPSULE ORAL ONCE
Status: DISCONTINUED | OUTPATIENT
Start: 2020-03-09 | End: 2020-03-09 | Stop reason: HOSPADM

## 2020-03-09 RX ORDER — ONDANSETRON 2 MG/ML
4 INJECTION INTRAMUSCULAR; INTRAVENOUS EVERY 12 HOURS PRN
Status: DISCONTINUED | OUTPATIENT
Start: 2020-03-09 | End: 2020-03-09 | Stop reason: HOSPADM

## 2020-03-09 RX ORDER — LIDOCAINE HYDROCHLORIDE 20 MG/ML
INJECTION, SOLUTION INFILTRATION; PERINEURAL
Status: DISCONTINUED | OUTPATIENT
Start: 2020-03-09 | End: 2020-03-09 | Stop reason: HOSPADM

## 2020-03-09 NOTE — PLAN OF CARE
1515  Patient transferred to recovery cath lab slot 3 via stretcher with side rails up x2 .  Pt drowsy but able to follow commands. Pt  connected to cardiac monitors.  VSS. Left upper arm dressing c.d.i. no bleeding or hematoma noted. LEft arm fistula positive for bruit.  No Thrill palpated. OK to go home in one hour per Dr Henriquez.  2+ right  radial pulse ans left radial pulse dopplered.  Skin normal in color and warm to touch, < 3 sec cap refill.  Fall risk precautions given and patient acknowledges.  AIDET completed to pt.  Will continue to monitor patient.  Updated pt's  in sx waiting room.

## 2020-03-09 NOTE — Clinical Note
The site was marked. Prepped with: ChloraPrep. The site was clipped. The patient was draped. Left Arm prepped

## 2020-03-09 NOTE — PLAN OF CARE
Patient lying in bed with no complaints of pain or distress noted.  Monitors applied.  VSS.  Yellow non- skid socks placed on patient. Fall risk review with patient.  Procedure and recovery process explained to patient and all questions answered. Pt wants to discuss procedure with Dr Martinez MD informed.   Patient verbalized understanding.  Will continue to monitor.

## 2020-03-09 NOTE — TELEPHONE ENCOUNTER
Reached out to pt     Pt advised to reach out to the cath lab in regards to procedure arrival time

## 2020-03-09 NOTE — PROCEDURES
: Jacinto Henriquez MD  Pre-procedure diagnosis: AVG malfunction  Post-procedure diagnosis: Functioning AVG    Post Procedure Note: Fistulagram, atherectomy, PTA, stent    The pt was brought to the cath lab and under sterile technique, AVG access was obtained without difficulty venous and arterial sides. AVG thrombosed throughout its entirety with arterial anastomosis stenosis and venous anastomosis stenosis. Successful angiojet followed by IVUS guided PTA and covered stent placement over venous anastomosis. Crystal balloon used for arterial anastomosis. Good results obtained with palpable thrill post intervention. Please see full report for details. The pt tolerated the procedure well without complications. Access sites sutured and manual pressure held with successful hemostasis.     Vitals:    03/09/20 1150   BP: (!) 144/73   BP Location: Right arm   Patient Position: Lying   Pulse: 99   Resp: 20   Temp: 98.8 °F (37.1 °C)   SpO2: (!) 93%   Weight: 54.9 kg (121 lb)         Gen: NAD  Ext: 2+ radial pulse, no evidence of hematoma, palpable thrill  Estimated blood loss: < 50 cc    Plan:  -Post cath care per protocol  -OK for HD

## 2020-03-09 NOTE — DISCHARGE SUMMARY
[facility]  Discharge Summary      Admit Date: 3/9/2020    Discharge Date and Time:  03/09/2020 3:12 PM    Attending Physician: Jacinto Henriquez MD     Reason for Admission: Fistulagram with intervention    Procedures Performed: Procedure(s) (LRB):  PTA, Fistula  ULTRASOUND, INTRAVASCULAR (N/A)  Stent, Fistula  Venogram, Upper Extremity, Unilateral  Peripheral angiography (N/A)  Thrombectomy    Hospital Course (synopsis of major diagnoses, care, treatment, and services provided during the course of the hospital stay): The pt was brought to the cath lab and fistulagram revealed thrombosed AVG. Successful angiojet followed by IVUS guided PTA with 5.0 balloon and Crystal balloon for arterial anastomosis stenosis, 6.0 balloon thoughout AVG. 6.0 x 80 Covera covered stent at venous anastomosis for severe stenosis, all with excellent results and good flow post intervention. No immediate post procedure complications.        Final Diagnoses:    Principal Problem: Dialysis AV fistula malfunction, sequela   Secondary Diagnoses:   Active Hospital Problems    Diagnosis  POA    *Dialysis AV fistula malfunction, sequela [T82.590S]  Not Applicable    ESRD (end stage renal disease) on dialysis [N18.6, Z99.2]  Not Applicable     Chronic    Acute congestive heart failure [I50.9]  Yes    COPD (chronic obstructive pulmonary disease) [J44.9]  Yes     Chronic     Pt has MMRC 3 symptoms of dyspnea & multiple hospitalizations for difficulty breathing. Recommend changing spiriva inhaler to trelegy. Continue prn nebulizer treatments. Recommended pulmonary rehab though the commute may be too much for her (she lives in [place]). She will get back to me. I suggested regular aerobic exercise as the next best alternative to pulmonary rehab.      Chronic diastolic heart failure [I50.32]  Yes     She takes lasix 20mg BID. Continue lasix & follow up with cardiology.      Chronic respiratory failure with hypoxia, on home O2  therapy [J96.11, Z99.81]  Not Applicable     Chronic    Anemia, chronic renal failure, stage 4 (severe) [N18.4, D63.1]  Yes    Acute on chronic respiratory failure with hypoxia [J96.21]  Yes    Essential hypertension [I10]  Yes     Chronic    Lumbar radiculopathy, BLE [M54.16]  Yes    Cervical post-laminectomy syndrome [M96.1]  Yes    Melanoma [C43.9]  Yes      Resolved Hospital Problems   No resolved problems to display.       Discharged Condition: good    Disposition: Home or Self Care    Follow Up/Patient Instructions:   As previously scheduled    Medications:  Reconciled Home Medications:      Medication List      ASK your doctor about these medications    albuterol-ipratropium 2.5 mg-0.5 mg/3 mL nebulizer solution  Commonly known as:  DUO-NEB  Take 3 mLs by nebulization every 6 (six) hours as needed for Shortness of Breath. Rescue     allopurinoL 100 MG tablet  Commonly known as:  ZYLOPRIM  Take 1 tablet (100 mg total) by mouth on Tuesday, Thursday, Saturday, and Sunday.     amLODIPine 2.5 MG tablet  Commonly known as:  NORVASC  Take 2.5 mg by mouth once daily.     busPIRone 15 MG tablet  Commonly known as:  BUSPAR  Take 1 tablet (15 mg total) by mouth 2 (two) times daily.     clonazePAM 1 MG tablet  Commonly known as:  KLONOPIN  Take 1 tablet (1 mg total) by mouth 2 (two) times daily as needed for Anxiety.     cyproheptadine 4 mg tablet  Commonly known as:  PERIACTIN  Take 1 tablet (4 mg total) by mouth every 8 (eight) hours.     diphenhydrAMINE 25 mg capsule  Commonly known as:  BENADRYL  Take 25 mg by mouth every 6 (six) hours as needed for Itching.     epoetin hemant-epbx 10,000 unit/mL imjection  Commonly known as:  RETACRIT  Inject 0.5 mLs (5,000 Units total) into the skin every Mon, Wed, Fri.     * furosemide 80 MG tablet  Commonly known as:  LASIX  Take 1 tablet (80 mg total) by mouth twice daily on non dialysis days Tuesday, Thursday, Saturday, and Sunday.     * furosemide 80 MG tablet  Commonly  known as:  LASIX  Take 1 tablet (80 mg total) by mouth 2 (two) times daily.     HYDROcodone-acetaminophen  mg per tablet  Commonly known as:  NORCO  Take 1 tablet by mouth 3 (three) times daily as needed.     levoFLOXacin 750 MG tablet  Commonly known as:  LEVAQUIN  Take 1 tablet (750 mg total) by mouth once daily.     lidocaine-prilocaine cream  Commonly known as:  EMLA  Apply topically to left arm prior to dialysis.     mupirocin 2 % ointment  Commonly known as:  BACTROBAN  apply by Nasal route 2 (two) times daily.     ondansetron 4 MG Tbdl  Commonly known as:  ZOFRAN-ODT  Dissolve one tablet under the tongue every 6 (six) hours as needed.     potassium chloride SA 20 MEQ tablet  Commonly known as:  K-DUR,KLOR-CON  Take 2 tablets (40 mEq total) by mouth once daily.     sodium bicarbonate 650 MG tablet  Take 1 tablet (650 mg total) by mouth 2 (two) times daily.     traZODone 100 MG tablet  Commonly known as:  DESYREL  Take 100 mg by mouth nightly as needed for Insomnia.     Trelegy Ellipta 100-62.5-25 mcg Dsdv  Generic drug:  fluticasone-umeclidin-vilanter  Inhale 1 puff by mouth into the lungs once daily.     Vitamin D2 50,000 unit Cap  Generic drug:  ergocalciferol  Take 1 capsule by mouth once weekly for 10 weeks, then take 1 capsule by mouth once monthly.     zolpidem 5 MG Tab  Commonly known as:  AMBIEN  Take 5 mg by mouth every evening.         * This list has 2 medication(s) that are the same as other medications prescribed for you. Read the directions carefully, and ask your doctor or other care provider to review them with you.              No discharge procedures on file.

## 2020-03-09 NOTE — PLAN OF CARE
7769  Patient VS stable, denies any pain, left arm gauze/tegaderm CDI, no bleeding or hematoma. Tolerated water and juice with no nausea vomiting, refused lunch, multiple attempts.  Iv site with tip intact/ gauze and coban CDI.  Patient verbalized understanding of post procedure instructions, copy given.  Patient escorted downstairs in wheelchair to home with .  Going straight to dialysis.

## 2020-03-09 NOTE — INTERVAL H&P NOTE
The patient has been examined and the H&P has been reviewed:    I concur with the findings and changes have been noted since the H&P was written: Per HD center, graft non functioing and will plan on repeat fistulagram     Anesthesia/Surgery risks, benefits and alternative options discussed and understood by patient/family.          Active Hospital Problems    Diagnosis  POA    Dialysis AV fistula malfunction, sequela [T82.590S]  Not Applicable      Resolved Hospital Problems   No resolved problems to display.

## 2020-03-09 NOTE — TELEPHONE ENCOUNTER
Reason for Disposition   [1] Prescription not at pharmacy AND [2] was prescribed today by PCP    Protocols used: PCP CALL - NO TRIAGE-A-    Patient seen in [place] ED on 3/6 for blocked AV fistula. She was told to f/u with Dr. ALVAREZ Seals on Monday before dialysis, however she was not given an exact time she was to report to interventional cardiology on Monday. Per Dr. Anderson, patient should speak with Dr. Benitez on tomorrow for the plan because his schedule indicates he is in the clinic all day tomorrow. Dr. Anderson will send Dr. Benitez a message and I told the patient I would send him one as well. Patient understands to go to the ED if she feels bad or has complications. Mrs. Quevedo verbalized understanding.

## 2020-03-09 NOTE — PLAN OF CARE
Pt AAOX3, tolerating water, but declines eating at this time.  Dressing CDI, denies any pain.  Will continue to monitor.

## 2020-03-16 ENCOUNTER — TELEPHONE (OUTPATIENT)
Dept: PHYSICAL MEDICINE AND REHAB | Facility: CLINIC | Age: 75
End: 2020-03-16

## 2020-03-16 NOTE — TELEPHONE ENCOUNTER
Called and spoke with patient.  Patient asked to rescheduled due to Covid-19.  Patient asked to reschedule for Tues or Friday.  Appointment rescheduled for 07/28/20.              ----- Message from Zainab Vail sent at 3/16/2020 11:20 AM CDT -----  Contact: pt   Pt is calling to speak with the nurse pt has an appt for today and pt is in dialysis and can't make her appt today pt said she needs to reschedule on a day she isn't in dialysis. Can you please call pt at [phone]    GINA

## 2020-03-17 ENCOUNTER — HOSPITAL ENCOUNTER (OUTPATIENT)
Dept: RADIOLOGY | Facility: HOSPITAL | Age: 75
Discharge: HOME | End: 2020-03-17
Attending: FAMILY MEDICINE
Payer: MEDICARE

## 2020-03-17 ENCOUNTER — OFFICE VISIT (OUTPATIENT)
Dept: FAMILY MEDICINE | Facility: CLINIC | Age: 75
End: 2020-03-17
Payer: MEDICARE

## 2020-03-17 VITALS
HEIGHT: 63 IN | SYSTOLIC BLOOD PRESSURE: 128 MMHG | OXYGEN SATURATION: 95 % | HEART RATE: 116 BPM | WEIGHT: 117.75 LBS | DIASTOLIC BLOOD PRESSURE: 64 MMHG | BODY MASS INDEX: 20.86 KG/M2

## 2020-03-17 DIAGNOSIS — M16.0 PRIMARY OSTEOARTHRITIS OF BOTH HIPS: ICD-10-CM

## 2020-03-17 DIAGNOSIS — J90 PLEURAL EFFUSION: ICD-10-CM

## 2020-03-17 DIAGNOSIS — J44.89 COPD (CHRONIC OBSTRUCTIVE PULMONARY DISEASE) WITH CHRONIC BRONCHITIS: Primary | ICD-10-CM

## 2020-03-17 DIAGNOSIS — M54.2 CHRONIC NECK PAIN: ICD-10-CM

## 2020-03-17 DIAGNOSIS — M54.41 CHRONIC MIDLINE LOW BACK PAIN WITH RIGHT-SIDED SCIATICA: ICD-10-CM

## 2020-03-17 DIAGNOSIS — J44.89 COPD (CHRONIC OBSTRUCTIVE PULMONARY DISEASE) WITH CHRONIC BRONCHITIS: ICD-10-CM

## 2020-03-17 DIAGNOSIS — F41.9 ANXIETY: ICD-10-CM

## 2020-03-17 DIAGNOSIS — G89.29 CHRONIC MIDLINE LOW BACK PAIN WITH RIGHT-SIDED SCIATICA: ICD-10-CM

## 2020-03-17 DIAGNOSIS — G89.29 CHRONIC NECK PAIN: ICD-10-CM

## 2020-03-17 PROCEDURE — 1159F PR MEDICATION LIST DOCUMENTED IN MEDICAL RECORD: ICD-10-PCS | Mod: HCNC,S$GLB,, | Performed by: FAMILY MEDICINE

## 2020-03-17 PROCEDURE — 71046 XR CHEST PA AND LATERAL: ICD-10-PCS | Mod: 26,HCNC,, | Performed by: RADIOLOGY

## 2020-03-17 PROCEDURE — 71046 X-RAY EXAM CHEST 2 VIEWS: CPT | Mod: 26,HCNC,, | Performed by: RADIOLOGY

## 2020-03-17 PROCEDURE — 99215 OFFICE O/P EST HI 40 MIN: CPT | Mod: HCNC,S$GLB,, | Performed by: FAMILY MEDICINE

## 2020-03-17 PROCEDURE — 99999 PR PBB SHADOW E&M-EST. PATIENT-LVL V: ICD-10-PCS | Mod: PBBFAC,HCNC,, | Performed by: FAMILY MEDICINE

## 2020-03-17 PROCEDURE — 99999 PR PBB SHADOW E&M-EST. PATIENT-LVL V: CPT | Mod: PBBFAC,HCNC,, | Performed by: FAMILY MEDICINE

## 2020-03-17 PROCEDURE — 1125F PR PAIN SEVERITY QUANTIFIED, PAIN PRESENT: ICD-10-PCS | Mod: HCNC,S$GLB,, | Performed by: FAMILY MEDICINE

## 2020-03-17 PROCEDURE — 1125F AMNT PAIN NOTED PAIN PRSNT: CPT | Mod: HCNC,S$GLB,, | Performed by: FAMILY MEDICINE

## 2020-03-17 PROCEDURE — 1101F PR PT FALLS ASSESS DOC 0-1 FALLS W/OUT INJ PAST YR: ICD-10-PCS | Mod: HCNC,CPTII,S$GLB, | Performed by: FAMILY MEDICINE

## 2020-03-17 PROCEDURE — 99215 PR OFFICE/OUTPT VISIT, EST, LEVL V, 40-54 MIN: ICD-10-PCS | Mod: HCNC,S$GLB,, | Performed by: FAMILY MEDICINE

## 2020-03-17 PROCEDURE — 1159F MED LIST DOCD IN RCRD: CPT | Mod: HCNC,S$GLB,, | Performed by: FAMILY MEDICINE

## 2020-03-17 PROCEDURE — 71046 X-RAY EXAM CHEST 2 VIEWS: CPT | Mod: TC,HCNC,FY

## 2020-03-17 PROCEDURE — 1101F PT FALLS ASSESS-DOCD LE1/YR: CPT | Mod: HCNC,CPTII,S$GLB, | Performed by: FAMILY MEDICINE

## 2020-03-17 RX ORDER — CLONAZEPAM 1 MG/1
1 TABLET ORAL DAILY PRN
Qty: 28 TABLET | Refills: 1 | Status: SHIPPED | OUTPATIENT
Start: 2020-03-17 | End: 2020-05-20 | Stop reason: SDUPTHER

## 2020-03-17 RX ORDER — HYDROCODONE BITARTRATE AND ACETAMINOPHEN 10; 325 MG/1; MG/1
1 TABLET ORAL 3 TIMES DAILY PRN
Qty: 90 TABLET | Refills: 0 | Status: SHIPPED | OUTPATIENT
Start: 2020-03-21 | End: 2020-04-21 | Stop reason: SDUPTHER

## 2020-03-17 RX ORDER — ESCITALOPRAM OXALATE 10 MG/1
10 TABLET ORAL DAILY
Qty: 30 TABLET | Refills: 11 | Status: SHIPPED | OUTPATIENT
Start: 2020-03-17 | End: 2021-02-11 | Stop reason: SDUPTHER

## 2020-03-17 NOTE — PROGRESS NOTES
(Portions of this note were dictated using voice recognition software and may contain dictation related errors in spelling/grammar/syntax not found on text review)    CC:   Chief Complaint   Patient presents with    Hospital Follow Up       HPI: 74 y.o. female Last visit 01/28/2019    Medical history below including COPD, end-stage renal disease.  Has been recently followed by priority clinic after hospitalization, no reviewed.  Was admitted on 02/10 for acute on chronic respiratory failure multifactorial in nature with recurrent pleural effusion, volume overload, left lower lobe pneumonia.  Treated with empiric antibiotics.  Hemodialysis was initiated in the setting of YOSVANY on CKD with following overload.  Responded well.  Thoracentesis not pursued.  Attends dialysis Monday Wednesday Friday.  On supplemental oxygen.  Had expressed significant mental distress to Dr. Gambino from priority clinic secondary to starting dialysis and health problems.  Was prescribed Klonopin for short-term and buspirone 15 mg b.i.d..  Of note was on duloxetine at 1 point at 30 mg but discontinued when she was prior hospitalized  for mental status change, noted on my hospital visit note from January 2019..  At last visit in 2019 was started on trazodone for sleep, initially 50 mg but looks to have been increased to 100 mg at some point through the year.     Recently was admitted secondary to AV fistula  thrombosis on 03/09/2020 had angiogram in ballooning done with good flow post intervention.  No immediate postprocedure complications.    Currently extremely anxious and tearful in the office.  She feels like she is very distraught about dialysis and feels like she did not feel this way until she started dialysis.  She felt that the Klonopin as above did help short-term but was not able to get this refilled.  Buspirone did not help at all so she stop taking it.  Taking trazodone at night occasionally for sleep, states that she will usually  take Ambien on the nights that she is not taking trazodone.  Takes hydrocodone regularly from her physical medicine doctor for her chronic back pains.  Tries to take less than the prescribed dose.    In the past have been prescribed diazepam at 1 point and had an old pill of this and she tried it but did not help at all for her anxiety.  She does feel like the Klonopin worked better and was wondering if this could be possible to get refilled    Persistent cough for 4 months.  Admission as above.  Last chest x-ray from last month showed moderate left pleural effusion, no right pleural effusion.  She had been given another round of levofloxacin at [facility] in February.  Not taking any current antibiotics.  She does have COPD, currently on Trelegy, followed by pulmonology.  Albuterol inhaler at home, and may take twice a day.  Denies any fevers or chills.  No acute worsening of SOB or chest pain    Past Medical History:   Diagnosis Date    Acute congestive heart failure 2/10/2020    Anemia     Bilateral renal cysts     Cataract     Chronic LBP 7/26/2012    Chronic pain     CKD (chronic kidney disease), stage IV     COPD (chronic obstructive pulmonary disease)     Dehydration     Encounter for blood transfusion     HTN (hypertension)     Lumbar spondylosis     Melanoma     of the lip    Metabolic bone disease     Migraines, neuralgic     Osteoporosis     Primary osteoarthritis of both knees     s/p Rt TKA    Pulmonary embolism with infarction     Seizures 1972    x1 only    Subdeltoid bursitis, L>R. 9/27/2012    Ulcer     Vitamin D deficiency disease        Past Surgical History:   Procedure Laterality Date    BLADDER SUSPENSION      CATARACT EXTRACTION  11/18/13    left eye    CERVICAL LAMINECTOMY      x3, fusion x1    COLONOSCOPY  2009    FISTULOGRAM N/A 2/27/2020    Procedure: Fistulogram;  Surgeon: Noe Benitez MD;  Location: [facility] CATH LAB/EP;  Service: Cardiology;  Laterality:  N/A;    HYSTERECTOMY      JOINT REPLACEMENT  2001    total right knee     LUMBAR LAMINECTOMY      x 3, fusion x1    OOPHORECTOMY      PERIPHERAL ANGIOGRAPHY N/A 3/9/2020    Procedure: Peripheral angiography;  Surgeon: Jacinto Henriquez MD;  Location: [facility] CATH LAB/EP;  Service: Cardiology;  Laterality: N/A;    PLACEMENT OF ARTERIOVENOUS GRAFT Left 1/21/2020    Procedure: INSERTION, GRAFT, ARTERIOVENOUS;  Surgeon: Lindsey Louie MD;  Location: [facility] OR;  Service: General;  Laterality: Left;    THROMBECTOMY Left 2/3/2020    Procedure: THROMBECTOMY;  Surgeon: Lindsey Louie MD;  Location: [facility] OR;  Service: General;  Laterality: Left;    THROMBECTOMY  3/9/2020    Procedure: Thrombectomy;  Surgeon: Jacinto Henriquez MD;  Location: [facility] CATH LAB/EP;  Service: Cardiology;;       Family History   Problem Relation Age of Onset    Arthritis Mother     Stroke Mother     Hypertension Father     Cancer Father     Cataracts Father     Diabetes Maternal Aunt     Hypertension Maternal Grandfather     Heart disease Maternal Grandfather     Heart attack Maternal Grandfather     Cataracts Sister     Glaucoma Cousin        Social History     Tobacco Use    Smoking status: Former Smoker     Packs/day: 1.00     Types: Cigarettes    Smokeless tobacco: Former User     Quit date: 2/3/2015   Substance Use Topics    Alcohol use: Not Currently     Comment: Rare    Drug use: No       Lab Results   Component Value Date    WBC 9.42 03/09/2020    HGB 10.5 (L) 03/09/2020    HCT 35.2 (L) 03/09/2020    MCV 91 03/09/2020     03/09/2020    CHOL 166 07/17/2019    TRIG 146 07/17/2019    HDL 47 07/17/2019    ALT 9 (L) 02/23/2020    AST 22 02/23/2020    BILITOT 0.3 02/23/2020    ALKPHOS 107 02/23/2020     03/09/2020    K 3.6 03/09/2020    CL 93 (L) 03/09/2020    CREATININE 2.2 (H) 03/09/2020    ESTGFRAFRICA 24 (A) 03/09/2020    EGFRNONAA 21 (A) 03/09/2020    CALCIUM 10.3 03/09/2020    ALBUMIN 3.0 (L)  02/23/2020    BUN 15 03/09/2020    CO2 31 (H) 03/09/2020    TSH 2.189 02/23/2020    INR 1.0 03/06/2020    HGBA1C 5.2 01/22/2018    MICALBCREAT 11.7 07/24/2018    LDLCALC 89.8 07/17/2019    GLU 83 03/09/2020                 ROS:  GENERAL: No fever, chills, fatigability or weight loss.  SKIN: No rashes, no itching.  HEAD: No headaches.  EYES: No visual changes  EARS: No ear pain or changes in hearing.  NOSE: No congestion or rhinorrhea.  MOUTH & THROAT: No hoarseness, change in voice, or sore throat.  NODES: Denies swollen glands.  CHEST:  Above.  CARDIOVASCULAR: Denies chest pain, PND, orthopnea.  ABDOMEN: No nausea, vomiting, or changes in bowel function.  URINARY: No flank pain, dysuria or hematuria.  PERIPHERAL VASCULAR: No claudication or cyanosis.  MUSCULOSKELETAL: No joint stiffness or swelling. Denies back pain.  NEUROLOGIC: No weakness or numbness.    Vital signs reviewed  PE:   APPEARANCE: Well nourished, well developed, extremely tearful   HEAD: Normocephalic, atraumatic.  EYES   Conjunctivae noninjected.  NECK: Supple with no cervical lymphadenopathy.    CHEST: Good inspiratory effort.  Right lung clear, decreased air entry overall.  Left lung demonstrates crackles throughout posterior lung field and decreased air entry at base  CARDIOVASCULAR: Normal S1, S2. No rubs, murmurs, or gallops.  ABDOMEN: Bowel sounds normal. Not distended. Soft. No tenderness or masses. No organomegaly.  EXTREMITIES: No edema    psychiatric:  Extremely tearful and anxious affect  IMPRESSION  1. COPD (chronic obstructive pulmonary disease) with chronic bronchitis    2. Anxiety    3. Pleural effusion            PLAN  Orders Placed This Encounter   Procedures    X-Ray Chest PA And Lateral     Reviewed hospitalization and priority clinic documents, labs and workup as above      Cough:  X-ray above.  Continue Trelegy daily and albuterol as needed.  Continue home oxygen.  Has finished antibiotics from prior prescriptions as noted  above.  Looking out for improvement or not of effusion and atelectasis/consolidation from last x-ray in February.  Could consider temporary steroid burst for COPD if needed    Anxiety:  Start Lexapro 10 mg daily.  Clonazepam 1 mg if needed on dialysis days for acute anxiety relating to going to dialysis.  Would advise staying off Ambien.  Trazodone can be continued at bedtime for sleep.  Discussed significant caution with use of benzodiazepine along with her hydrocodone.  Recommend trying low-dose a benzodiazepine if possible such as half a clonazepam pill, and only hopefully using this as a bridging therapy until improved mood with SSRI treatment.

## 2020-03-17 NOTE — TELEPHONE ENCOUNTER
Last Rx refill-----02/20/20  Last office visit--11/04/19  Next office visit--07/28/20      ----- Message from Katy Montalvo sent at 3/17/2020 12:50 PM CDT -----  Pt is calling to get a refill on     HYDROcodone-acetaminophen (NORCO)  mg per tablet 90 tablet 0 2/23/2020 3/24/2020 No  Sig - Route: Take 1 tablet by mouth 3 (three) times daily as needed. - Oral  Sent to pharmacy as: HYDROcodone-acetaminophen (NORC

## 2020-03-17 NOTE — PATIENT INSTRUCTIONS
Cough:  Chest xray  Continue trelegy inhaler  Albuterol if you need it  Continue oxygen  Try mucinex DM over the counter    Anxiety:  Start lexapro daily 10 mg (maintenance)  Clonazepam 1 mg if needed on dialysis days for acute anxiety, try to take as little possible like half pill to see if this helps  Trazodone 100 mg at night, stop ambien

## 2020-03-20 ENCOUNTER — PATIENT MESSAGE (OUTPATIENT)
Dept: FAMILY MEDICINE | Facility: CLINIC | Age: 75
End: 2020-03-20

## 2020-03-20 NOTE — TELEPHONE ENCOUNTER
Please tell patient her chest x-ray does not show any new pneumonia.  There is still some fluid on the left side of the lung which is relatively unchanged.  I believe she said she has a pulmonologist established outside the [facility] system.  Would make sure to check with her lung doctor regarding any other treatment of the fluid.  I do not want to increase her fluid medicine out right because I do not want her to drop her blood pressure too much.  She may want to talk to her lung doctor and kidney doctor as well in case they do feel like she needs more fluid out during dialysis and follow-up of the lung fluid.

## 2020-04-21 DIAGNOSIS — M54.41 CHRONIC MIDLINE LOW BACK PAIN WITH RIGHT-SIDED SCIATICA: ICD-10-CM

## 2020-04-21 DIAGNOSIS — M16.0 PRIMARY OSTEOARTHRITIS OF BOTH HIPS: ICD-10-CM

## 2020-04-21 DIAGNOSIS — G89.29 CHRONIC MIDLINE LOW BACK PAIN WITH RIGHT-SIDED SCIATICA: ICD-10-CM

## 2020-04-21 DIAGNOSIS — G89.29 CHRONIC NECK PAIN: ICD-10-CM

## 2020-04-21 DIAGNOSIS — M54.2 CHRONIC NECK PAIN: ICD-10-CM

## 2020-04-21 RX ORDER — HYDROCODONE BITARTRATE AND ACETAMINOPHEN 10; 325 MG/1; MG/1
1 TABLET ORAL 3 TIMES DAILY PRN
Qty: 90 TABLET | Refills: 0 | Status: SHIPPED | OUTPATIENT
Start: 2020-04-21 | End: 2020-05-18 | Stop reason: SDUPTHER

## 2020-04-21 NOTE — TELEPHONE ENCOUNTER
06/09/17 last office visit  09/07/17 RTC  07/11/17 last Rx refill   Patient : Marvel Armando Age: 51 year old Sex: male   MRN: [id number] Encounter Date: 4/21/2020      History     Chief Complaint   Patient presents with   • Arm Swelling   • PICC Line Problem     HPI  50-year-old male presents to the emergency department with complaints of left arm swelling.  He states that he received a PICC line 1 month ago.  He is receiving PICC line antibiotics, cefazolin 2000 mg t.i.d. due to a foot infection in the right foot.  He has a history of diabetes, his current hemoglobin A1c is 7.0.  He states that he has not injured his left arm.  He is left-hand dominant.  He states at work yesterday his left arm jerked as he is using a leg scooter for ambulation.  He denies numbness, tingling or weakness in the left upper extremity.  Patient states that his medications have been flushing without difficulty.  He was advised by his infectious disease provider to come to the emergency department.  He also states that his left upper extremity was measured at 40 cm today and recently measured at 39 cm.   No Known Allergies    Discharge Medication List as of 4/21/2020  8:56 PM      Prior to Admission Medications    Details   CEFAZOLIN SODIUM IV Inject 2,000 mg into the vein 3 times daily. Pt is administering himselfHistorical Med      Sodium Chloride Flush (SALINE FLUSH IV) before and after antibiotic adminstration and with lab drawsHistorical Med      Heparin Lock Flush (HEPARIN, PORCINE, LOCK FLUSH IV) after antibiotic adminstrationHistorical Med      gentamicin (GARAMYCIN) 0.1 % ointment To right foot wounds dailyDisp-30 g, R-1, Eprescribe      Fenofibrate 134 MG Cap TAKE 1 CAPSULE BY MOUTH DAILY WITH BREAKFASTEprescribe, Disp-90 capsule, R-3      JARDIANCE 25 MG tablet TAKE 1 TABLET BY MOUTH DAILY BEFORE BREAKFASTEprescribe, Disp-90 tablet, R-3      silver sulfADIAZINE (THERMAZENE) 1 % cream Apply to a thickness of 1/16 inch (\"nickel thick\").Disp-50 g, R-0, Eprescribe      metformin (GLUCOPHAGE) 1000  MG tablet TAKE 1 TABLET BY MOUTH TWICE DAILY WITH MEALSEprescribe, Disp-180 tablet, R-3      atorvastatin (LIPITOR) 40 MG tablet TAKE 1 TABLET BY MOUTH DAILYEprescribe, Disp-90 tablet, R-3      acetaminophen (TYLENOL) 650 MG CR tablet Take 1,300 mg by mouth every 8 hours as needed for Pain.Historical Med      Glucose Blood (ACCU-CHEK SMARTVIEW) strip Indications: Diabetes Test  Blood  Sugars  Twice dailyDisp-100 each, R-12, Eprescribe      ACCU-CHEK FASTCLIX LANCETS MISC 1 each 2 times daily. Indications: DiabetesEprescribe, Disp-102 each, R-12Will pick  Up   This week             Discharge Medication List as of 4/21/2020  8:56 PM      New Prescriptions    Details   metroNIDAZOLE (FLAGYL) 500 MG tablet Take 1 tablet by mouth 3 times daily for 14 days.Eprescribe, Disp-42 tablet, R-0      enoxaparin (LOVENOX) 120 MG/0.8ML injectable solution Inject 0.8 mLs into the skin 2 (two) times a day.Eprescribe, Subcutaneous, 2 times daily, Disp-14 Syringe, R-0Dose should be adjusted based on patient's renal function. See recommendations below:     Prophylaxis dose    CrCl  >/= 30 mg/dL: 30 mg Q12H o r 40 mg Q24H   CrCl  < 30 mg/dL: 30 mg Q24H     Treatment dose   CrCl  >/= 30 mg/dL: 1 mg/kg Q12H (preferred) or 1.5 mg/kg Q24H   CrCl  < 30 mg/dL: 1 mg/kg Q24H      warfarin (COUMADIN) 5 MG tablet Take 1 tablet by mouth daily for 5 days.Eprescribe, Disp-5 tablet, R-0             Past Medical History:   Diagnosis Date   • Arthritis    • Chronic rhinitis    • Diabetes mellitus (CMS/HCC)    • Elevated blood pressure reading without diagnosis of hypertension    • Herpes zoster    • HLD (hyperlipidemia)    • IFG (impaired fasting glucose)    • Obesity    • DANETTE (obstructive sleep apnea)    • Pancreatitis 03/2018   • Psoriatic arthropathy (CMS/HCC)    • Renal insufficiency     borderline       Past Surgical History:   Procedure Laterality Date   • ANKLE SURGERY     • COLONOSCOPY DIAGNOSTIC  10/10/2011    Colonoscopy, Dx   •  ESOPHAGOGASTRODUODENOSCOPY  2016   • ESOPHAGOGASTRODUODENOSCOPY  2016   • ESOPHAGOGASTRODUODENOSCOPY TRANSORAL FLEX W/ENDO US EXAM  2016    Mild heterogeneous appearance to the pancreas,Dr. Burger   • ESOPHAGOGASTRODUODENOSCOPY W/ENDOSCOPIC US ESO STOMACH DUOD  16    CT in one year. Heterogenous appearance to Pancreas.   • EXCISION OF LINGUAL TONSIL     • TONSILLECTOMY         Family History   Problem Relation Age of Onset   • Heart disease Mother         stents   • Heart disease Father    • Early death Father 35         of 2nd heart attack   • Thyroid Sister    • Hypertension Paternal Grandmother    • Diabetes Paternal Grandmother        Social History     Tobacco Use   • Smoking status: Former Smoker     Packs/day: 0.00   • Smokeless tobacco: Former User   Substance Use Topics   • Alcohol use: No     Frequency: Never     Drinks per session: 1 or 2     Binge frequency: Never     Comment: Very minimal since last bought of pancreatitis   • Drug use: No       Review of Systems   Constitutional: Positive for activity change. Negative for chills, fatigue and fever.   HENT: Negative.    Respiratory: Negative.  Negative for cough, shortness of breath and wheezing.    Gastrointestinal: Negative.  Negative for abdominal pain, diarrhea, nausea and vomiting.   Genitourinary: Negative.  Negative for dysuria and flank pain.   Musculoskeletal: Negative for arthralgias and back pain.        Left upper extremity swelling   Neurological: Negative.  Negative for dizziness, tremors, seizures, syncope, numbness and headaches.   Psychiatric/Behavioral: Negative.        Physical Exam     ED Triage Vitals [20 1806]   ED Triage Vitals Group      Temp 97.9 °F (36.6 °C)      Heart Rate 100      Resp 16      BP (!) 175/95      SpO2 95 %      EtCO2 mmHg       Height 6' (1.829 m)      Weight 267 lb 3.2 oz (121.2 kg)      Weight Scale Used ED Actual      BMI (Calculated) 36.24      IBW/kg (Calculated) 77.6        Physical Exam   Constitutional: He is oriented to person, place, and time. He appears well-developed and well-nourished. No distress.   Cardiovascular: Normal rate, regular rhythm and normal heart sounds.   Abdominal: Soft.   Musculoskeletal: Normal range of motion.      Comments: LUE swelling, No warmth, erythema. Pulses and sensation intact distally. Full ROM of all joints in LUE.   Neurological: He is alert and oriented to person, place, and time.   Skin: Skin is warm and dry. He is not diaphoretic.   Psychiatric: He has a normal mood and affect. His behavior is normal. Judgment and thought content normal.   Nursing note and vitals reviewed.      ED Course     8:40 PM:  Case discussed with Dr. Saavedra of Infectious Disease at bedside.  He has evaluated the patient.  Given the patient's axillary and basilic thrombus, partially occluded, patient will be started on Lovenox and transitioned to warfarin.  He will be given 120 mg b.i.d..  He is 120 mg exactly.  He will be started on 5 mg of warfarin.  His most recent creatinine is 1.05.  Patient agreeable with this plan of care.  I have placed a service order to the Coumadin clinic.  Patient will also be started on Flagyl per request of Dr. Saavedra.  The PICC line was not removed in the emergency department per ID consultant.     Procedures    Lab Results     Results for orders placed or performed during the hospital encounter of 04/21/20   Comprehensive Metabolic Panel   Result Value Ref Range    Fasting Status      Sodium 142 135 - 145 mmol/L    Potassium 4.1 3.4 - 5.1 mmol/L    Chloride 104 98 - 107 mmol/L    Carbon Dioxide 27 21 - 32 mmol/L    Anion Gap 15 10 - 20 mmol/L    Glucose 119 (H) 65 - 99 mg/dL    BUN 23 (H) 6 - 20 mg/dL    Creatinine 1.39 (H) 0.67 - 1.17 mg/dL    Glomerular Filtration Rate 57 (L) >90    BUN/ Creatinine Ratio 17 7 - 25    Bilirubin, Total 0.5 0.2 - 1.0 mg/dL    GOT/AST 18 <=37 Units/L    Alkaline Phosphatase 83 45 - 117 Units/L     Albumin 4.1 3.6 - 5.1 g/dL    Protein, Total 7.8 6.4 - 8.2 g/dL    Globulin 3.7 2.0 - 4.0 g/dL    A/G Ratio 1.1 1.0 - 2.4    GPT/ALT 22 <64 Units/L    Calcium 9.1 8.4 - 10.2 mg/dL   Sedimentation Rate Westergren   Result Value Ref Range    RBC Sedimentation Rate 8 0 - 20 mm/hr   C Reactive Protein   Result Value Ref Range    C-Reactive Protein 1.3 (H) <=1.0 mg/dL   Prothrombin Time   Result Value Ref Range    Prothrombin Time 10.9 9.7 - 11.8 sec    INR 1.0 <=5.0   Partial Thromboplastin Time   Result Value Ref Range    PTT 27 22 - 32 sec   CBC with Automated Differential (performable only)   Result Value Ref Range    WBC 7.3 4.2 - 11.0 K/mcL    RBC 4.73 4.50 - 5.90 mil/mcL    HGB 13.6 13.0 - 17.0 g/dL    HCT 40.6 39.0 - 51.0 %    MCV 85.8 78.0 - 100.0 fl    MCH 28.8 26.0 - 34.0 pg    MCHC 33.5 32.0 - 36.5 g/dL    RDW-CV 13.2 11.0 - 15.0 %     (L) 140 - 450 K/mcL    NRBC 0 <=0 /100 WBC    Neutrophil, Percent 63 %    Lymphocytes, Percent 21 %    Mono, Percent 13 %    Eosinophils, Percent 2 %    Basophils, Percent 1 %    Immature Granulocytes 0 %    Absolute Neutrophils 4.6 1.8 - 7.7 K/mcL    Absolute Lymphocytes 1.6 1.0 - 4.0 K/mcL    Absolute Monocytes 0.9 0.3 - 0.9 K/mcL    Absolute Eosinophils  0.2 0.1 - 0.5 K/mcL    Absolute Basophils 0.1 0.0 - 0.3 K/mcL    Absolute Immmature Granulocytes 0.0 0.0 - 0.2 K/mcL    RDW-SD 40.8 39.0 - 50.0 fL   Gold Top   Result Value Ref Range    Extra Tube Hold for Add Ons        EKG Results       Radiology Results     Imaging Results          US Upper Extremity Venous Duplex Left (Final result)  Result time 04/21/20 19:43:48    Final result                 Impression:    IMPRESSION:    Partially occlusive thrombus within the left axillary and basilic veins.               Narrative:    US UPPER EXTREMITY VENOUS DUPLEX LEFT    HISTORY: Upper extremity edema.    COMPARISON: None.    TECHNIQUE: Grayscale, color Doppler, and spectral duplex images are  obtained of the left upper  extremity deep venous system.    FINDINGS:    The visualized internal jugular vein is patent and demonstrates normal  color flow and spectral morphology. The visualized subclavian vein is  patent with normal color flow and spectral morphology. There is normal  compressibility, spectral flow and distal augmentation of the brachial  vein.     A PICC is identified in the basilic vein. The axillary and basilic veins  are partially compressible, compatible with partially occlusive thrombus.    The cephalic and basilic vein demonstrates normal color Doppler flow.                                ED Medication Orders (From admission, onward)    Ordered Start     Status Ordering Provider    04/21/20 2022 04/21/20 2023  enoxaparin (LOVENOX) injection 120 mg  ONCE      Last MAR action:  Given HEVESY, FADIA LOPEZ               MDM  51-year-old male to the emergency department with complaints of left arm swelling without warmth or years a period concerns for DVT.  Patient has a PICC line in place.  The patient was evaluated by the infectious disease physician who is managing his right foot infection.  An ultrasound was performed and revealed basilic and axillary thrombus, partially occluded.  Infectious disease physician came to the emergency department saw the patient.  Labs were ordered and x-rays as well.  Patient was given Lovenox and warfarin.  Patient will follow-up with his primary care doctor who runs the warfarin Clinic.  He was given information on teaching for Lovenox.  Dr. Saavedra has also prescribed Flagyl for his foot infection.  The PICC line was not removed in the emergency department.  All questions were answered prior to discharge.  The patient does not appear toxic, his vital signs do not recommend SIRS or sepsis criteria.  All questions answered prior to discharge.  Clinical Impression     ED Diagnosis   1. Acute deep vein thrombosis (DVT) of axillary vein of left upper extremity (CMS/HCC)  ANTICOAGULANT MANAGEMENT  FOR PATIENT ON WARFARIN    SERVICE TO ANTICOAG CLINIC   2. Basilic vein thrombosis  ANTICOAGULANT MANAGEMENT FOR PATIENT ON WARFARIN    SERVICE TO ANTICOAG CLINIC       Disposition        Discharge after Treatment 4/21/2020  8:56 PM  There is no comment       SUNI Castillo  04/23/20 0904

## 2020-05-05 ENCOUNTER — TELEPHONE (OUTPATIENT)
Dept: PULMONOLOGY | Facility: CLINIC | Age: 75
End: 2020-05-05

## 2020-05-05 NOTE — TELEPHONE ENCOUNTER
----- Message from [place] sent at 5/5/2020  4:24 PM CDT -----  Contact: Katie   tel:   [phone]  Would like to  Have an appt. Over the IPHONE  This week.   Symptoms:   COPD .   Breathing is harder.    May have fluid on her lungs again.     Pls. Call to discuss this.

## 2020-05-05 NOTE — TELEPHONE ENCOUNTER
----- Message from Karol Sampson sent at 5/5/2020 11:09 AM CDT -----  Contact: pt  Pt would like to be called back regarding making a appt    Pt can be reached at [phone]

## 2020-05-05 NOTE — TELEPHONE ENCOUNTER
Left message on patient voicemail, informing her that I have received her message. I advised patient that if she would like to schedule a appointment or if she has any questions or concerns, she may contact the office. Office number has been provided.

## 2020-05-05 NOTE — TELEPHONE ENCOUNTER
Left message on patient voicemail informing her that I'm contacting her in regards to her message that was sent over. I advised patient that if she wants to schedule a appointment or if she has any questions or concerns, she may contact the office. Office number has been provided.

## 2020-05-18 DIAGNOSIS — G89.29 CHRONIC NECK PAIN: ICD-10-CM

## 2020-05-18 DIAGNOSIS — M54.41 CHRONIC MIDLINE LOW BACK PAIN WITH RIGHT-SIDED SCIATICA: ICD-10-CM

## 2020-05-18 DIAGNOSIS — M54.2 CHRONIC NECK PAIN: ICD-10-CM

## 2020-05-18 DIAGNOSIS — M16.0 PRIMARY OSTEOARTHRITIS OF BOTH HIPS: ICD-10-CM

## 2020-05-18 DIAGNOSIS — G89.29 CHRONIC MIDLINE LOW BACK PAIN WITH RIGHT-SIDED SCIATICA: ICD-10-CM

## 2020-05-18 RX ORDER — HYDROCODONE BITARTRATE AND ACETAMINOPHEN 10; 325 MG/1; MG/1
1 TABLET ORAL 3 TIMES DAILY PRN
Qty: 90 TABLET | Refills: 0 | Status: SHIPPED | OUTPATIENT
Start: 2020-05-21 | End: 2020-06-23 | Stop reason: SDUPTHER

## 2020-05-18 NOTE — TELEPHONE ENCOUNTER
Last Rx refill-----04/21/20  Last office visit--11/04/19  Next office visit-- 07/28/20        ----- Message from Judy Calvillo sent at 5/18/2020  9:11 AM CDT -----  Contact: pt  Reason: Refill request for HYDROcodone-acetaminophen (NORCO)  mg per tablet    Communication: [phone]

## 2020-05-19 ENCOUNTER — TELEPHONE (OUTPATIENT)
Dept: PULMONOLOGY | Facility: CLINIC | Age: 75
End: 2020-05-19

## 2020-05-19 NOTE — TELEPHONE ENCOUNTER
Spoke with patient grand daughter (Nevaeh) informed her that I have received her message. Patient grand daughter states that patient is not feeling to well to come into clinic to visit with Dr Rust on 5/26/20. I verbalized to patient grand daughter that I understand and advised patient grand daughter that patient do not have to cancel her appointment, I advised patient grand daughter that patient can do a virtual visit with physician on telephone. Patient grand daughter states that patient would like to change her appointment to virtual visit with Dr Rust. I verbalized to patient grand daughter that I understand and advised patient grand daughter that I will change patient appointment to virtual visit. Patient grand daughter verbalized that she understand.

## 2020-05-19 NOTE — TELEPHONE ENCOUNTER
----- Message from Annette Ayon sent at 5/19/2020 10:12 AM CDT -----  Contact: Granddaughter  Pt would like a call back in regards to the appt upcoming.      Contact Info [phone] (sjsv)

## 2020-05-25 ENCOUNTER — PATIENT OUTREACH (OUTPATIENT)
Dept: ADMINISTRATIVE | Facility: OTHER | Age: 75
End: 2020-05-25

## 2020-05-26 ENCOUNTER — PATIENT MESSAGE (OUTPATIENT)
Dept: PULMONOLOGY | Facility: CLINIC | Age: 75
End: 2020-05-26

## 2020-05-28 ENCOUNTER — TELEPHONE (OUTPATIENT)
Dept: PULMONOLOGY | Facility: CLINIC | Age: 75
End: 2020-05-28

## 2020-05-28 ENCOUNTER — OFFICE VISIT (OUTPATIENT)
Dept: PULMONOLOGY | Facility: CLINIC | Age: 75
End: 2020-05-28
Payer: MEDICARE

## 2020-05-28 DIAGNOSIS — Z99.81 CHRONIC RESPIRATORY FAILURE WITH HYPOXIA, ON HOME O2 THERAPY: Chronic | ICD-10-CM

## 2020-05-28 DIAGNOSIS — Z99.2 ESRD (END STAGE RENAL DISEASE) ON DIALYSIS: Chronic | ICD-10-CM

## 2020-05-28 DIAGNOSIS — R06.00 DYSPNEA, UNSPECIFIED TYPE: Primary | ICD-10-CM

## 2020-05-28 DIAGNOSIS — Z79.899 MEDICATION MANAGEMENT: ICD-10-CM

## 2020-05-28 DIAGNOSIS — R06.02 SHORTNESS OF BREATH: ICD-10-CM

## 2020-05-28 DIAGNOSIS — J43.2 CENTRILOBULAR EMPHYSEMA: Chronic | ICD-10-CM

## 2020-05-28 DIAGNOSIS — N18.6 ESRD (END STAGE RENAL DISEASE) ON DIALYSIS: Chronic | ICD-10-CM

## 2020-05-28 DIAGNOSIS — I31.39 PERICARDIAL EFFUSION: ICD-10-CM

## 2020-05-28 DIAGNOSIS — J90 PLEURAL EFFUSION, LEFT: ICD-10-CM

## 2020-05-28 DIAGNOSIS — J96.11 CHRONIC RESPIRATORY FAILURE WITH HYPOXIA, ON HOME O2 THERAPY: Chronic | ICD-10-CM

## 2020-05-28 PROCEDURE — 99499 UNLISTED E&M SERVICE: CPT | Mod: 95,,, | Performed by: INTERNAL MEDICINE

## 2020-05-28 PROCEDURE — 1101F PT FALLS ASSESS-DOCD LE1/YR: CPT | Mod: HCNC,CPTII,95, | Performed by: INTERNAL MEDICINE

## 2020-05-28 PROCEDURE — 99499 RISK ADDL DX/OHS AUDIT: ICD-10-PCS | Mod: 95,,, | Performed by: INTERNAL MEDICINE

## 2020-05-28 PROCEDURE — 1159F PR MEDICATION LIST DOCUMENTED IN MEDICAL RECORD: ICD-10-PCS | Mod: HCNC,95,, | Performed by: INTERNAL MEDICINE

## 2020-05-28 PROCEDURE — 1159F MED LIST DOCD IN RCRD: CPT | Mod: HCNC,95,, | Performed by: INTERNAL MEDICINE

## 2020-05-28 PROCEDURE — 1101F PR PT FALLS ASSESS DOC 0-1 FALLS W/OUT INJ PAST YR: ICD-10-PCS | Mod: HCNC,CPTII,95, | Performed by: INTERNAL MEDICINE

## 2020-05-28 PROCEDURE — 99215 PR OFFICE/OUTPT VISIT, EST, LEVL V, 40-54 MIN: ICD-10-PCS | Mod: HCNC,95,, | Performed by: INTERNAL MEDICINE

## 2020-05-28 PROCEDURE — 99215 OFFICE O/P EST HI 40 MIN: CPT | Mod: HCNC,95,, | Performed by: INTERNAL MEDICINE

## 2020-05-28 NOTE — TELEPHONE ENCOUNTER
Spoke with patient, informed her that I have spoken with Dr Rust and he advised me to schedule patient appointment for today beginning at 12:00 pm. Patient verbalized that she understand and excepted the appointment.

## 2020-05-28 NOTE — PROGRESS NOTES
"History & Physical  [facility]        SUBJECTIVE:     Chief Complaint:   COPD    History of Present Illness:  Katie Quevedo is a 74 y.o. female who presents for follow up of COPD & shortness of breath. PMH is significant for ESRD started on dialysis since her last visit, chronic diastolic CHF, pleural effusion, chronic hypoxemic respiratory failure on home O2. She uses trelegy once daily for COPD. The pleural effusion has been sampled twice in the past & was transudative based on light's criteria.     At baseline she reports MMRC3 symptoms of dyspnea. However at present, she notes episodes of "hyperventilation." These episodes occur at anytime but particularly at night & while at dialysis. She is not sure what triggers then but thinks it may be anxiety. She does note shortness of breath during the episodes, & it is hard to tease apart whether her symptoms are primarily anxiety related or breathing related. The episodes are not really relieved by her rescue inhaler/nebulizer. Twice in the past week they have had to cut her dialysis short due to these episodes. She denies chest pain, cough, fever.    She is a former smoker who quit 6 years ago.    Review of patient's allergies indicates:   Allergen Reactions    Aspirin      Other reaction(s): hx of ulcers    Tetracycline Swelling     Other reaction(s): Swelling    Penicillins Rash     Other reaction(s): Hives  Other reaction(s): Rash  Other reaction(s): Rash  Other reaction(s): Hives       Past Medical History:   Diagnosis Date    Acute congestive heart failure 2/10/2020    Anemia     Bilateral renal cysts     Cataract     Chronic LBP 7/26/2012    Chronic pain     CKD (chronic kidney disease), stage IV     COPD (chronic obstructive pulmonary disease)     Dehydration     Encounter for blood transfusion     HTN (hypertension)     Lumbar spondylosis     Melanoma     of the lip    Metabolic bone disease     Migraines, neuralgic     Osteoporosis  "    Primary osteoarthritis of both knees     s/p Rt TKA    Pulmonary embolism with infarction     Seizures 1972    x1 only    Subdeltoid bursitis, L>R. 9/27/2012    Ulcer     Vitamin D deficiency disease      Past Surgical History:   Procedure Laterality Date    BLADDER SUSPENSION      CATARACT EXTRACTION  11/18/13    left eye    CERVICAL LAMINECTOMY      x3, fusion x1    COLONOSCOPY  2009    FISTULOGRAM N/A 2/27/2020    Procedure: Fistulogram;  Surgeon: Noe Benitez MD;  Location: [facility] CATH LAB/EP;  Service: Cardiology;  Laterality: N/A;    HYSTERECTOMY      JOINT REPLACEMENT  2001    total right knee     LUMBAR LAMINECTOMY      x 3, fusion x1    OOPHORECTOMY      PERIPHERAL ANGIOGRAPHY N/A 3/9/2020    Procedure: Peripheral angiography;  Surgeon: Jacinto Henriquez MD;  Location: [facility] CATH LAB/EP;  Service: Cardiology;  Laterality: N/A;    PLACEMENT OF ARTERIOVENOUS GRAFT Left 1/21/2020    Procedure: INSERTION, GRAFT, ARTERIOVENOUS;  Surgeon: Lindsey Louie MD;  Location: [facility] OR;  Service: General;  Laterality: Left;    THROMBECTOMY Left 2/3/2020    Procedure: THROMBECTOMY;  Surgeon: Lindsey Louie MD;  Location: [facility] OR;  Service: General;  Laterality: Left;    THROMBECTOMY  3/9/2020    Procedure: Thrombectomy;  Surgeon: Jacinto Henriquez MD;  Location: [facility] CATH LAB/EP;  Service: Cardiology;;     Family History   Problem Relation Age of Onset    Arthritis Mother     Stroke Mother     Hypertension Father     Cancer Father     Cataracts Father     Diabetes Maternal Aunt     Hypertension Maternal Grandfather     Heart disease Maternal Grandfather     Heart attack Maternal Grandfather     Cataracts Sister     Glaucoma Cousin      Social History     Socioeconomic History    Marital status:      Spouse name: Not on file    Number of children: Not on file    Years of education: Not on file    Highest education level: Not on file   Occupational History    Not on  file   Social Needs    Financial resource strain: Not on file    Food insecurity:     Worry: Not on file     Inability: Not on file    Transportation needs:     Medical: Not on file     Non-medical: Not on file   Tobacco Use    Smoking status: Former Smoker     Types: Cigarettes    Smokeless tobacco: Former User     Quit date: 2/3/2015   Substance and Sexual Activity    Alcohol use: Not Currently     Comment: Rare    Drug use: No    Sexual activity: Never     Partners: Male   Lifestyle    Physical activity:     Days per week: Not on file     Minutes per session: Not on file    Stress: Not on file   Relationships    Social connections:     Talks on phone: Not on file     Gets together: Not on file     Attends Confucianist service: Not on file     Active member of club or organization: Not on file     Attends meetings of clubs or organizations: Not on file     Relationship status: Not on file   Other Topics Concern    Are you pregnant or think you may be? Not Asked    Breast-feeding Not Asked   Social History Narrative    Not on file       Review of Systems:  CV: no syncope  ENT: no sore throat  Resp: per hpi  Eyes: no eye pain  Gastrointestinal: no nausea or vomiting  Integument/Breast: no rash  Musculoskeletal: no arthralgias  Neurological: no headaches  Behavioral/Psych: no confusion or depression  Heme: no bleeding      OBJECTIVE:     Vital Signs  There were no vitals filed for this visit.  There is no height or weight on file to calculate BMI.    Physical Exam:  General: +emotional distress over her symptoms but this resolved by the end of the visit  Eyes:  conjunctivae/corneas clear  Nose: no discharge  Neck: trachea midline  Lungs:  normal respiratory effort  Neurologic: alert, oriented, thought content appropriate  Psych: Pt seems anxious & overwhelmed. This improved by the end of the visit.   & SpO2 95% measured by home pulse ox    Laboratory:  Lab Results   Component Value Date    WBC 9.42  03/09/2020    HGB 10.5 (L) 03/09/2020    HCT 35.2 (L) 03/09/2020    MCV 91 03/09/2020     03/09/2020       Chest Imaging, My Impression:   CT chest 6/2019: large left pleural effusion, +pericardial effusion, +small mediastinal lymph nodes, no overt lung masses or nodules are apparent  CXR 3/2020: +left pleural effusion is stable, +hyperinflation, otherwise normal    Diagnostic Results:  Echo: Reviewed   PFT 2/2018: + obstruction, FEV1 1.20 (55% predicted), +air trapping, +hyperinflation, DLCO 47% of predicted    ASSESSMENT/PLAN:     Problem Noted   Esrd (End Stage Renal Disease) On Dialysis 2/19/2020           Copd (Chronic Obstructive Pulmonary Disease) 12/18/2019    Pt has MMRC 3 symptoms of dyspnea at baseline. It is not clear to me that her current symptoms are related to her COPD. Recommend continuing trelegy for now & prn nebulizer treatments.      Chronic Diastolic Heart Failure 10/30/2019    She takes lasix 20mg BID. Continue lasix & follow up with cardiology.     Chronic Respiratory Failure With Hypoxia, On Home O2 Therapy 7/18/2019    Continue supplemental oxygen.     Shortness of Breath 7/17/2019    Recommend evaluation with CXR, BNP, CBC, & echo.     Pleural Effusion, Left     Transudate based on light's criteria. Repeat CXR to follow up.      Medication Management   Pt confused about her medications. We went over every medicine & provided education & instructions. She was inadvertently taking several medications incorrectly.      The patient location is: [facility]  The chief complaint leading to consultation is: COPD    Visit type: audiovisual    Face to Face time with patient: 30  45 minutes of total time spent on the encounter, which includes face to face time and non-face to face time preparing to see the patient (eg, review of tests), Obtaining and/or reviewing separately obtained history, Documenting clinical information in the electronic or other health record, Independently interpreting  results (not separately reported) and communicating results to the patient/family/caregiver, or Care coordination (not separately reported).         Each patient to whom he or she provides medical services by telemedicine is:  (1) informed of the relationship between the physician and patient and the respective role of any other health care provider with respect to management of the patient; and (2) notified that he or she may decline to receive medical services by telemedicine and may withdraw from such care at any time.    Notes:

## 2020-05-28 NOTE — TELEPHONE ENCOUNTER
----- Message from Cindy Schmidt sent at 5/28/2020  9:56 AM CDT -----  Contact: pt's granddaughter  Pt's Grandlandon    Nevaeh   Ph   [phone]     Pt's mother and granddaughter have some concerns about pt's  Health  Her phone is ready for virtual now    BUT   PT IS NOT DOING WELL   AND THEY WOULD PREFER IF SHE  CAME TO THE OFFICE   TO BE CHECKED     PT  IS AFRAID OF THE NASAL COVID TEST     SAID SHE WILL VOMIT       IS THERE ANOTHER TEST SHE CAN DO??        PT IS ON DIALYSIS   Monday WED Friday     Please call Nevaeh Puri

## 2020-05-28 NOTE — TELEPHONE ENCOUNTER
Spoke with patient grand daughter Nevaeh, informed her that I have received her message. I advised patient grand daughter that we do not have another test for patient to take in place of the Covid Screening. Patient grand daughter verbalized that she understand. I advised patient grand daughter that patient virtual appointment has been rescheduled for today beginning at 12:00 pm. Patient grand daughter verbalized that she understand and states that she would like for dr todd to contact her after patient appointment in regards to patient virtual appointment. I verbalized to patient grand daughter that I understand and will forward message to Dr Todd to review/advise. Patient grand daughter verbalized that she understand.      Patient grand daughter (Nevaeh) contact information is [phone].

## 2020-05-28 NOTE — TELEPHONE ENCOUNTER
Spoke with patient, informed her that I have received her message. I advised patient that she does not have a virtual appointment on today with Dr Rust, I advised patient that her virtual appointment was on 5/26/20 for 1:00 pm. Patient verbalized that she understand and states that her virtual appointment on 5/26/20 was incomplete by Dr Rust. Patient states that she spoke with Dr Rust about her visit and he told her to reschedule appointment. I verbalized to patient that I understand and advised patient that I will forward her message to Dr Rust to review/advise. Patient verbalized that she understand.

## 2020-06-02 ENCOUNTER — HOSPITAL ENCOUNTER (OUTPATIENT)
Dept: RADIOLOGY | Facility: HOSPITAL | Age: 75
Discharge: HOME | End: 2020-06-02
Attending: INTERNAL MEDICINE
Payer: MEDICARE

## 2020-06-02 ENCOUNTER — HOSPITAL ENCOUNTER (OUTPATIENT)
Dept: CARDIOLOGY | Facility: CLINIC | Age: 75
Discharge: HOME | End: 2020-06-02
Attending: INTERNAL MEDICINE
Payer: MEDICARE

## 2020-06-02 VITALS
WEIGHT: 118 LBS | SYSTOLIC BLOOD PRESSURE: 142 MMHG | HEART RATE: 110 BPM | HEIGHT: 62 IN | DIASTOLIC BLOOD PRESSURE: 80 MMHG | BODY MASS INDEX: 21.71 KG/M2

## 2020-06-02 DIAGNOSIS — I31.39 PERICARDIAL EFFUSION: ICD-10-CM

## 2020-06-02 DIAGNOSIS — R06.00 DYSPNEA, UNSPECIFIED TYPE: ICD-10-CM

## 2020-06-02 LAB
ASCENDING AORTA: 3.05 CM
AV INDEX (PROSTH): 0.88
AV MEAN GRADIENT: 4 MMHG
AV PEAK GRADIENT: 6 MMHG
AV VALVE AREA: 2.89 CM2
AV VELOCITY RATIO: 1.01
BSA FOR ECHO PROCEDURE: 1.53 M2
CV ECHO LV RWT: 0.42 CM
DOP CALC AO PEAK VEL: 1.22 M/S
DOP CALC AO VTI: 21.29 CM
DOP CALC LVOT AREA: 3.3 CM2
DOP CALC LVOT DIAMETER: 2.04 CM
DOP CALC LVOT PEAK VEL: 1.23 M/S
DOP CALC LVOT STROKE VOLUME: 61.55 CM3
DOP CALCLVOT PEAK VEL VTI: 18.84 CM
E WAVE DECELERATION TIME: 106.65 MSEC
E/A RATIO: 1.34
E/E' RATIO: 10.7 M/S
ECHO LV POSTERIOR WALL: 0.68 CM (ref 0.6–1.1)
FRACTIONAL SHORTENING: 36 % (ref 28–44)
INTERVENTRICULAR SEPTUM: 0.69 CM (ref 0.6–1.1)
LA MAJOR: 4.45 CM
LA MINOR: 4.76 CM
LA WIDTH: 2.99 CM
LEFT ATRIUM SIZE: 3.23 CM
LEFT ATRIUM VOLUME INDEX: 24.7 ML/M2
LEFT ATRIUM VOLUME: 37.76 CM3
LEFT INTERNAL DIMENSION IN SYSTOLE: 2.1 CM (ref 2.1–4)
LEFT VENTRICLE DIASTOLIC VOLUME INDEX: 28.31 ML/M2
LEFT VENTRICLE DIASTOLIC VOLUME: 43.25 ML
LEFT VENTRICLE MASS INDEX: 36 G/M2
LEFT VENTRICLE SYSTOLIC VOLUME INDEX: 9.5 ML/M2
LEFT VENTRICLE SYSTOLIC VOLUME: 14.45 ML
LEFT VENTRICULAR INTERNAL DIMENSION IN DIASTOLE: 3.27 CM (ref 3.5–6)
LEFT VENTRICULAR MASS: 54.62 G
LV LATERAL E/E' RATIO: 15.38 M/S
LV SEPTAL E/E' RATIO: 8.2 M/S
MV PEAK A VEL: 0.92 M/S
MV PEAK E VEL: 1.23 M/S
PISA TR MAX VEL: 2.99 M/S
PULM VEIN S/D RATIO: 1.17
PV PEAK D VEL: 0.46 M/S
PV PEAK S VEL: 0.54 M/S
RA MAJOR: 3.62 CM
RA PRESSURE: 15 MMHG
RA WIDTH: 2.28 CM
RIGHT VENTRICULAR END-DIASTOLIC DIMENSION: 2.58 CM
RV TISSUE DOPPLER FREE WALL SYSTOLIC VELOCITY 1 (APICAL 4 CHAMBER VIEW): 12.06 CM/S
SINUS: 2.86 CM
STJ: 2.91 CM
TDI LATERAL: 0.08 M/S
TDI SEPTAL: 0.15 M/S
TDI: 0.12 M/S
TR MAX PG: 36 MMHG
TRICUSPID ANNULAR PLANE SYSTOLIC EXCURSION: 1.53 CM
TV REST PULMONARY ARTERY PRESSURE: 51 MMHG

## 2020-06-02 PROCEDURE — 93306 ECHO (CUPID ONLY): ICD-10-PCS | Mod: 26,HCNC,, | Performed by: INTERNAL MEDICINE

## 2020-06-02 PROCEDURE — 71046 XR CHEST PA AND LATERAL: ICD-10-PCS | Mod: 26,HCNC,, | Performed by: RADIOLOGY

## 2020-06-02 PROCEDURE — 93306 TTE W/DOPPLER COMPLETE: CPT | Mod: 26,HCNC,, | Performed by: INTERNAL MEDICINE

## 2020-06-02 PROCEDURE — 93306 TTE W/DOPPLER COMPLETE: CPT | Mod: HCNC

## 2020-06-02 PROCEDURE — 71046 X-RAY EXAM CHEST 2 VIEWS: CPT | Mod: TC,HCNC,FY

## 2020-06-02 PROCEDURE — 71046 X-RAY EXAM CHEST 2 VIEWS: CPT | Mod: 26,HCNC,, | Performed by: RADIOLOGY

## 2020-06-05 DIAGNOSIS — J90 PLEURAL EFFUSION: Primary | ICD-10-CM

## 2020-06-05 RX ORDER — PREDNISONE 10 MG/1
TABLET ORAL
Qty: 15 TABLET | Refills: 0 | Status: SHIPPED | OUTPATIENT
Start: 2020-06-05 | End: 2020-11-09

## 2020-06-05 NOTE — PROGRESS NOTES
Spoke with patient. Relayed test results. She is tolerating dialysis better. She is still experiencing lots of dyspnea. Will prescribe prednisone for copd exacerbation. Will set her up to have a thoracentesis to see if this provides her any relief.    Giancarlo Rust MD  [facility]

## 2020-06-08 ENCOUNTER — TELEPHONE (OUTPATIENT)
Dept: INTERVENTIONAL RADIOLOGY/VASCULAR | Facility: HOSPITAL | Age: 75
End: 2020-06-08

## 2020-06-08 ENCOUNTER — TELEPHONE (OUTPATIENT)
Dept: PULMONOLOGY | Facility: CLINIC | Age: 75
End: 2020-06-08

## 2020-06-08 NOTE — TELEPHONE ENCOUNTER
Spoke with patient grand daughter (Chintan) informed her that I have received her message. Patient grand daughter states that patient would like to get advised a little more on her test results. I verbalized to patient that I understand and will foward her message to Dr Rust to review/advise. Patient grand daughter verbalized that she understand.

## 2020-06-08 NOTE — TELEPHONE ENCOUNTER
----- Message from Scottie Sheth sent at 6/8/2020  3:07 PM CDT -----  Contact: Pt Daughter - Chintan   Requesting doctor or staff to return phone call in regards to Chest xray results    [phone] ( Chintan )

## 2020-06-09 ENCOUNTER — LAB VISIT (OUTPATIENT)
Dept: FAMILY MEDICINE | Facility: CLINIC | Age: 75
End: 2020-06-09
Payer: MEDICARE

## 2020-06-09 PROCEDURE — U0003 INFECTIOUS AGENT DETECTION BY NUCLEIC ACID (DNA OR RNA); SEVERE ACUTE RESPIRATORY SYNDROME CORONAVIRUS 2 (SARS-COV-2) (CORONAVIRUS DISEASE [COVID-19]), AMPLIFIED PROBE TECHNIQUE, MAKING USE OF HIGH THROUGHPUT TECHNOLOGIES AS DESCRIBED BY CMS-2020-01-R: HCPCS | Mod: HCNC

## 2020-06-09 NOTE — TELEPHONE ENCOUNTER
Called patient's daughter. Went to [employer]. Left detailed message. If pt's daughter calls again, please suggest contacting me via [facility] or providing a specific time of day when she can be reached.

## 2020-06-10 DIAGNOSIS — J90 PLEURAL EFFUSION, LEFT: Primary | ICD-10-CM

## 2020-06-10 LAB — SARS-COV-2 RNA RESP QL NAA+PROBE: NOT DETECTED

## 2020-06-11 ENCOUNTER — HOSPITAL ENCOUNTER (OUTPATIENT)
Dept: INTERVENTIONAL RADIOLOGY/VASCULAR | Facility: HOSPITAL | Age: 75
Discharge: HOME | End: 2020-06-11
Attending: INTERNAL MEDICINE
Payer: MEDICARE

## 2020-06-11 ENCOUNTER — TELEPHONE (OUTPATIENT)
Dept: INTERVENTIONAL RADIOLOGY/VASCULAR | Facility: HOSPITAL | Age: 75
End: 2020-06-11

## 2020-06-11 ENCOUNTER — HOSPITAL ENCOUNTER (OUTPATIENT)
Dept: RADIOLOGY | Facility: HOSPITAL | Age: 75
Discharge: HOME | End: 2020-06-11
Attending: STUDENT IN AN ORGANIZED HEALTH CARE EDUCATION/TRAINING PROGRAM
Payer: MEDICARE

## 2020-06-11 VITALS
SYSTOLIC BLOOD PRESSURE: 130 MMHG | RESPIRATION RATE: 16 BRPM | OXYGEN SATURATION: 100 % | DIASTOLIC BLOOD PRESSURE: 64 MMHG | HEART RATE: 105 BPM

## 2020-06-11 DIAGNOSIS — J90 PLEURAL EFFUSION: ICD-10-CM

## 2020-06-11 DIAGNOSIS — Z98.890 STATUS POST THORACENTESIS: ICD-10-CM

## 2020-06-11 PROCEDURE — 32555 ASPIRATE PLEURA W/ IMAGING: CPT | Mod: HCNC,LT,, | Performed by: RADIOLOGY

## 2020-06-11 PROCEDURE — A4215 STERILE NEEDLE: HCPCS | Mod: HCNC

## 2020-06-11 PROCEDURE — 88305 TISSUE EXAM BY PATHOLOGIST: CPT | Mod: HCNC | Performed by: PATHOLOGY

## 2020-06-11 PROCEDURE — 71045 X-RAY EXAM CHEST 1 VIEW: CPT | Mod: 26,HCNC,, | Performed by: RADIOLOGY

## 2020-06-11 PROCEDURE — 88112 CYTOPATH CELL ENHANCE TECH: CPT | Mod: HCNC | Performed by: PATHOLOGY

## 2020-06-11 PROCEDURE — 71045 X-RAY EXAM CHEST 1 VIEW: CPT | Mod: TC,HCNC,FY

## 2020-06-11 PROCEDURE — 71045 XR CHEST 1 VIEW: ICD-10-PCS | Mod: 26,HCNC,, | Performed by: RADIOLOGY

## 2020-06-11 PROCEDURE — 32555 IR THORACENTESIS WITH IMAGING: ICD-10-PCS | Mod: HCNC,LT,, | Performed by: RADIOLOGY

## 2020-06-11 NOTE — H&P
Radiology History & Physical      SUBJECTIVE:     Chief Complaint: Shortness of breath with left sided pleural effusion on recent CXR    History of Present Illness:  Katie Quevedo is a 75 y.o. female who presents for left sided thoracentesis.    Past Medical History:   Diagnosis Date    Acute congestive heart failure 2/10/2020    Anemia     Bilateral renal cysts     Cataract     Chronic LBP 7/26/2012    Chronic pain     CKD (chronic kidney disease), stage IV     COPD (chronic obstructive pulmonary disease)     Dehydration     Encounter for blood transfusion     HTN (hypertension)     Lumbar spondylosis     Melanoma     of the lip    Metabolic bone disease     Migraines, neuralgic     Osteoporosis     Primary osteoarthritis of both knees     s/p Rt TKA    Pulmonary embolism with infarction     Seizures 1972    x1 only    Subdeltoid bursitis, L>R. 9/27/2012    Ulcer     Vitamin D deficiency disease      Past Surgical History:   Procedure Laterality Date    BLADDER SUSPENSION      CATARACT EXTRACTION  11/18/13    left eye    CERVICAL LAMINECTOMY      x3, fusion x1    COLONOSCOPY  2009    FISTULOGRAM N/A 2/27/2020    Procedure: Fistulogram;  Surgeon: Noe Benitez MD;  Location: [facility] CATH LAB/EP;  Service: Cardiology;  Laterality: N/A;    HYSTERECTOMY      JOINT REPLACEMENT  2001    total right knee     LUMBAR LAMINECTOMY      x 3, fusion x1    OOPHORECTOMY      PERIPHERAL ANGIOGRAPHY N/A 3/9/2020    Procedure: Peripheral angiography;  Surgeon: Jacinto Henriquez MD;  Location: [facility] CATH LAB/EP;  Service: Cardiology;  Laterality: N/A;    PLACEMENT OF ARTERIOVENOUS GRAFT Left 1/21/2020    Procedure: INSERTION, GRAFT, ARTERIOVENOUS;  Surgeon: Lindsey Louie MD;  Location: [facility] OR;  Service: General;  Laterality: Left;    THROMBECTOMY Left 2/3/2020    Procedure: THROMBECTOMY;  Surgeon: Lindsey Louie MD;  Location: [facility] OR;  Service: General;  Laterality: Left;    THROMBECTOMY   3/9/2020    Procedure: Thrombectomy;  Surgeon: Jacinto Henriquez MD;  Location: [facility] CATH LAB/EP;  Service: Cardiology;;       Home Meds:   Prior to Admission medications    Medication Sig Start Date End Date Taking? Authorizing Provider   albuterol-ipratropium (DUO-NEB) 2.5 mg-0.5 mg/3 mL nebulizer solution Take 3 mLs by nebulization every 6 (six) hours as needed for Shortness of Breath. Rescue 12/18/19 12/17/20  Giancarlo Rust MD   allopurinoL (ZYLOPRIM) 100 MG tablet Take 1 tablet (100 mg total) by mouth on Tuesday, Thursday, Saturday, and Sunday. 2/14/20   Aura Diggs MD   amLODIPine (NORVASC) 2.5 MG tablet Take 2.5 mg by mouth once daily.     Historical Provider, MD   B complex-vitamin C-folic acid (LINDA-RADHA) 0.8 mg Tab Take 1 tablet (0.8 mg total) by mouth once daily. 4/29/20   Aura Diggs MD   clonazePAM (KLONOPIN) 1 MG tablet Take 1 tablet (1 mg total) by mouth daily as needed (on dialysis days for anxiety.). 5/20/20   Kingston Verduzco MD   cyproheptadine (PERIACTIN) 4 mg tablet Take 1 tablet (4 mg total) by mouth every 8 (eight) hours. 1/30/20   Lindsey Louie MD   diphenhydrAMINE (BENADRYL) 25 mg capsule Take 25 mg by mouth every 6 (six) hours as needed for Itching.    Historical Provider, MD   epoetin hemant-epbx (RETACRIT) 10,000 unit/mL imjection Inject 0.5 mLs (5,000 Units total) into the skin every Mon, Wed, Fri. 3/2/20   Ramos Navarro,    ergocalciferol (ERGOCALCIFEROL) 50,000 unit Cap Take 1 capsule by mouth once weekly for 10 weeks, then take 1 capsule by mouth once monthly. 2/14/20   Aura Diggs MD   escitalopram oxalate (LEXAPRO) 10 MG tablet Take 1 tablet (10 mg total) by mouth once daily. 3/17/20 3/17/21  Kingston Verduzco MD   fluticasone-umeclidin-vilanter (TRELEGY ELLIPTA) 100-62.5-25 mcg DsDv Inhale 1 puff by mouth into the lungs once daily. 1/14/20   Clemencia Gambino MD   furosemide (LASIX) 80 MG tablet Take 1 tablet (80 mg total) by mouth twice daily  on non dialysis days Tuesday, Thursday, Saturday, and Sunday. 2/21/20   Jennifer Bowles MD   furosemide (LASIX) 80 MG tablet Take 1 tablet (80 mg total) by mouth 2 (two) times daily. 2/21/20 2/20/21  Jennifer Bowles MD   HYDROcodone-acetaminophen (NORCO)  mg per tablet Take 1 tablet by mouth 3 (three) times daily as needed. 5/21/20 6/20/20  Pushpa Mcmahon MD   levoFLOXacin (LEVAQUIN) 750 MG tablet Take 1 tablet (750 mg total) by mouth once daily. 2/18/20   Clemencia Gambino MD   lidocaine-prilocaine (EMLA) cream Apply topically to left arm prior to dialysis. 2/14/20   Aura Diggs MD   mupirocin (BACTROBAN) 2 % ointment apply by Nasal route 2 (two) times daily. 2/14/20   Jennifer Bowles MD   ondansetron (ZOFRAN-ODT) 4 MG TbDL Dissolve one tablet under the tongue every 6 (six) hours as needed. 9/16/19   Jere Mercado NP   potassium chloride SA (K-DUR,KLOR-CON) 20 MEQ tablet Take 2 tablets (40 mEq total) by mouth once daily. 2/21/20   Jennifer Bowles MD   predniSONE (DELTASONE) 10 MG tablet Take 2 pills daily for 5 days. Then take one pill daily until gone. 6/5/20   Giancarlo Rust MD   sodium bicarbonate 650 MG tablet Take 1 tablet (650 mg total) by mouth 2 (two) times daily. 7/29/19 7/28/20  Aura Diggs MD   traZODone (DESYREL) 100 MG tablet Take 100 mg by mouth nightly as needed for Insomnia.    Historical Provider, MD     Anticoagulants/Antiplatelets: no anticoagulation    Allergies:   Review of patient's allergies indicates:   Allergen Reactions    Aspirin      Other reaction(s): hx of ulcers    Tetracycline Swelling     Other reaction(s): Swelling    Penicillins Rash     Other reaction(s): Hives  Other reaction(s): Rash  Other reaction(s): Rash  Other reaction(s): Hives     Sedation History:  no adverse reactions    Review of Systems:   Hematological: no known coagulopathies  Respiratory: + shortness of breath  Cardiovascular: no  chest pain  Gastrointestinal: no abdominal pain  Genito-Urinary: no dysuria  Musculoskeletal: negative  Neurological: no TIA or stroke symptoms         OBJECTIVE:     Vital Signs (Most Recent)  Pulse: 110 (06/11/20 0700)  Resp: 20 (06/11/20 0700)  BP: 129/73 (06/11/20 0700)  SpO2: 99 % (06/11/20 0700)    Physical Exam:  ASA: 2  Mallampati: 2    General: no acute distress  Mental Status: alert and oriented to person, place and time  HEENT: normocephalic, atraumatic  Chest:  SOB  Heart: regular heart rate  Abdomen: nondistended  Extremity: moves all extremities    Laboratory  Lab Results   Component Value Date    INR 1.0 03/06/2020       Lab Results   Component Value Date    WBC 7.39 06/02/2020    HGB 11.7 (L) 06/02/2020    HCT 39.9 06/02/2020    MCV 97 06/02/2020     06/02/2020      Lab Results   Component Value Date    GLU 71 06/02/2020     06/02/2020    K 4.1 06/02/2020    CL 93 (L) 06/02/2020    CO2 33 (H) 06/02/2020    BUN 17 06/02/2020    CREATININE 1.3 06/02/2020    CALCIUM 10.1 06/02/2020    MG 2.0 02/28/2020    ALT 9 (L) 02/23/2020    AST 22 02/23/2020    ALBUMIN 3.0 (L) 02/23/2020    BILITOT 0.3 02/23/2020       ASSESSMENT/PLAN:     Sedation Plan: Local    Patient will undergo left sided thoracentesis with US guidance.      Jani Castillo MD  Radiology, PGY - IV

## 2020-06-11 NOTE — NURSING
Xray read by MD Dalton and BRIGIDA, pt left via own scooter in [facility] to meet family in lobby

## 2020-06-11 NOTE — DISCHARGE INSTRUCTIONS
"For scheduling: Call Evelin at [phone]      After hours and weekends: Call [phone] and ask for "Radiology Resident on Call'    For immediate concerns that are not emergent, you may call our radiology clinic at [phone]  "

## 2020-06-11 NOTE — TELEPHONE ENCOUNTER
Your test was NEGATIVE for COVID-19 (coronavirus).  If you still have symptoms, treat with rest, fluids, and over-the-counter medications.  Continue to follow local guidelines and practice proper handwashing.     If your symptoms worsen or if you have any other concerns, please contact [facility] at [phone].     Sincerely,    Mary Gaming PA-C

## 2020-06-11 NOTE — DISCHARGE SUMMARY
Vascular and Interventional Radiology Discharge Summary      Hospital Course:  No complications    Admit Date:  6/11/2020    Discharge Date:  06/11/2020     Instructions Given to Patient:  Yes    Diet:  Resume prior diet    Activity:  Activity as tolerated    Description of Condition on Discharge:  Stable    Vital Signs (Most Recent):    Pulse: 105 (06/11/20 0841)  Resp: 16 (06/11/20 0841)  BP: 130/64 (06/11/20 0841)  SpO2: 100 % (06/11/20 0841)    Discharge Disposition:  Home    Discharge Diagnosis:  Left pleural effusion    Procedure(s) Performed:  Ultrasound guided left thoracentesis     Follow-up:  As scheduled    Discharge Prescriptions:  Per discharge orders      Nicolas Dalton MD  [facility], Diagnostic and Interventional Radiology  Pager: [phone]

## 2020-06-11 NOTE — NURSING
Left thoracentesis completed, 30ml removed and sent for cytology as ordered, STAT xray ordered, vitals WNL, waiting for xray, dc instructions given, pt verbalized understanding

## 2020-06-11 NOTE — PROCEDURES
Vascular and Interventional Radiology Post-Procedure Note    Pre Op Diagnosis: left pleural effusion  Post Op Diagnosis:  Same    Procedure: ultrasound guided left thoracentesis    Procedure Performed By: Shree Shannon MD, Nicolas Dalton MD (res)    Written Informed Consent Obtained?:  Yes  Specimen Removed:  YES 30 cc serosanguineous  Estimated Blood Loss:  Minimal    Findings:   Successful left thoracentesis. Patient tolerated procedure well. See imaging report for details.       Nicolas Dalton MD  [facility], Diagnostic and Interventional Radiology  Pager: [phone]

## 2020-06-12 LAB — FINAL PATHOLOGIC DIAGNOSIS: NORMAL

## 2020-06-23 DIAGNOSIS — G89.29 CHRONIC NECK PAIN: ICD-10-CM

## 2020-06-23 DIAGNOSIS — M54.2 CHRONIC NECK PAIN: ICD-10-CM

## 2020-06-23 DIAGNOSIS — M54.41 CHRONIC MIDLINE LOW BACK PAIN WITH RIGHT-SIDED SCIATICA: ICD-10-CM

## 2020-06-23 DIAGNOSIS — G89.29 CHRONIC MIDLINE LOW BACK PAIN WITH RIGHT-SIDED SCIATICA: ICD-10-CM

## 2020-06-23 DIAGNOSIS — M16.0 PRIMARY OSTEOARTHRITIS OF BOTH HIPS: ICD-10-CM

## 2020-06-23 RX ORDER — HYDROCODONE BITARTRATE AND ACETAMINOPHEN 10; 325 MG/1; MG/1
1 TABLET ORAL 3 TIMES DAILY PRN
Qty: 90 TABLET | Refills: 0 | Status: SHIPPED | OUTPATIENT
Start: 2020-06-23 | End: 2020-07-30 | Stop reason: SDUPTHER

## 2020-06-23 NOTE — TELEPHONE ENCOUNTER
Last Rx refill-----05/18/20  Last office visit--11/04/19  Next office visit--07/28/20          ----- Message from Uzair Kim sent at 6/23/2020  9:17 AM CDT -----  Contact: Patient  Rx Refill/Request     Is this a Refill or New Rx:  yes   Rx Name and Strength:  HYDROcodone-acetaminophen (NORCO)  mg per tablet  Preferred Pharmacy with phone number:     [facility] #[id number]  [place] [facility] [address]  [address] [address] [place] [address]  Phone: [phone] Fax: [phone]

## 2020-07-06 ENCOUNTER — HOSPITAL ENCOUNTER (EMERGENCY)
Facility: HOSPITAL | Age: 75
Discharge: LEFT BEFORE DISCHARGE | End: 2020-07-06
Attending: EMERGENCY MEDICINE
Payer: MEDICARE

## 2020-07-06 VITALS
OXYGEN SATURATION: 100 % | DIASTOLIC BLOOD PRESSURE: 67 MMHG | SYSTOLIC BLOOD PRESSURE: 145 MMHG | BODY MASS INDEX: 20.43 KG/M2 | HEART RATE: 94 BPM | WEIGHT: 111 LBS | TEMPERATURE: 99 F | HEIGHT: 62 IN | RESPIRATION RATE: 23 BRPM

## 2020-07-06 DIAGNOSIS — F41.0 ANXIETY ATTACK: ICD-10-CM

## 2020-07-06 DIAGNOSIS — N18.6 ESRD ON HEMODIALYSIS: ICD-10-CM

## 2020-07-06 DIAGNOSIS — J81.0 ACUTE PULMONARY EDEMA: Primary | ICD-10-CM

## 2020-07-06 DIAGNOSIS — R06.02 SOB (SHORTNESS OF BREATH): ICD-10-CM

## 2020-07-06 DIAGNOSIS — R06.02 SHORTNESS OF BREATH: ICD-10-CM

## 2020-07-06 DIAGNOSIS — Z99.2 ESRD ON HEMODIALYSIS: ICD-10-CM

## 2020-07-06 LAB
ALBUMIN SERPL BCP-MCNC: 3.6 G/DL (ref 3.5–5.2)
ALP SERPL-CCNC: 285 U/L (ref 55–135)
ALT SERPL W/O P-5'-P-CCNC: 10 U/L (ref 10–44)
ANION GAP SERPL CALC-SCNC: 11 MMOL/L (ref 8–16)
AST SERPL-CCNC: 23 U/L (ref 10–40)
BASOPHILS # BLD AUTO: 0.05 K/UL (ref 0–0.2)
BASOPHILS NFR BLD: 0.8 % (ref 0–1.9)
BILIRUB SERPL-MCNC: 1.6 MG/DL (ref 0.1–1)
BNP SERPL-MCNC: 320 PG/ML (ref 0–99)
BUN SERPL-MCNC: 8 MG/DL (ref 8–23)
CALCIUM SERPL-MCNC: 8.5 MG/DL (ref 8.7–10.5)
CHLORIDE SERPL-SCNC: 103 MMOL/L (ref 95–110)
CO2 SERPL-SCNC: 24 MMOL/L (ref 23–29)
CREAT SERPL-MCNC: 0.9 MG/DL (ref 0.5–1.4)
DIFFERENTIAL METHOD: ABNORMAL
EOSINOPHIL # BLD AUTO: 0.2 K/UL (ref 0–0.5)
EOSINOPHIL NFR BLD: 3.3 % (ref 0–8)
ERYTHROCYTE [DISTWIDTH] IN BLOOD BY AUTOMATED COUNT: 18.6 % (ref 11.5–14.5)
EST. GFR  (AFRICAN AMERICAN): >60 ML/MIN/1.73 M^2
EST. GFR  (NON AFRICAN AMERICAN): >60 ML/MIN/1.73 M^2
GLUCOSE SERPL-MCNC: 82 MG/DL (ref 70–110)
HCT VFR BLD AUTO: 41.3 % (ref 37–48.5)
HGB BLD-MCNC: 12.5 G/DL (ref 12–16)
IMM GRANULOCYTES # BLD AUTO: 0.01 K/UL (ref 0–0.04)
IMM GRANULOCYTES NFR BLD AUTO: 0.2 % (ref 0–0.5)
INR PPP: 1.1 (ref 0.8–1.2)
LYMPHOCYTES # BLD AUTO: 1.1 K/UL (ref 1–4.8)
LYMPHOCYTES NFR BLD: 17.2 % (ref 18–48)
MCH RBC QN AUTO: 29.2 PG (ref 27–31)
MCHC RBC AUTO-ENTMCNC: 30.3 G/DL (ref 32–36)
MCV RBC AUTO: 97 FL (ref 82–98)
MONOCYTES # BLD AUTO: 0.7 K/UL (ref 0.3–1)
MONOCYTES NFR BLD: 10.6 % (ref 4–15)
NEUTROPHILS # BLD AUTO: 4.5 K/UL (ref 1.8–7.7)
NEUTROPHILS NFR BLD: 67.9 % (ref 38–73)
NRBC BLD-RTO: 0 /100 WBC
PLATELET # BLD AUTO: 205 K/UL (ref 150–350)
PMV BLD AUTO: 10.1 FL (ref 9.2–12.9)
POTASSIUM SERPL-SCNC: 4.3 MMOL/L (ref 3.5–5.1)
PROT SERPL-MCNC: 7.5 G/DL (ref 6–8.4)
PROTHROMBIN TIME: 11.4 SEC (ref 9–12.5)
RBC # BLD AUTO: 4.28 M/UL (ref 4–5.4)
SODIUM SERPL-SCNC: 138 MMOL/L (ref 136–145)
TROPONIN I SERPL DL<=0.01 NG/ML-MCNC: 0.01 NG/ML (ref 0–0.03)
WBC # BLD AUTO: 6.62 K/UL (ref 3.9–12.7)

## 2020-07-06 PROCEDURE — 25000242 PHARM REV CODE 250 ALT 637 W/ HCPCS: Mod: HCNC | Performed by: EMERGENCY MEDICINE

## 2020-07-06 PROCEDURE — 96375 TX/PRO/DX INJ NEW DRUG ADDON: CPT | Mod: 59,HCNC

## 2020-07-06 PROCEDURE — 80053 COMPREHEN METABOLIC PANEL: CPT | Mod: HCNC

## 2020-07-06 PROCEDURE — 32555 ASPIRATE PLEURA W/ IMAGING: CPT | Mod: HCNC,LT

## 2020-07-06 PROCEDURE — 85610 PROTHROMBIN TIME: CPT | Mod: HCNC

## 2020-07-06 PROCEDURE — 96374 THER/PROPH/DIAG INJ IV PUSH: CPT | Mod: 59,HCNC

## 2020-07-06 PROCEDURE — 94640 AIRWAY INHALATION TREATMENT: CPT | Mod: HCNC

## 2020-07-06 PROCEDURE — 84484 ASSAY OF TROPONIN QUANT: CPT | Mod: HCNC

## 2020-07-06 PROCEDURE — 99285 EMERGENCY DEPT VISIT HI MDM: CPT | Mod: 25,HCNC

## 2020-07-06 PROCEDURE — 93005 ELECTROCARDIOGRAM TRACING: CPT | Mod: HCNC

## 2020-07-06 PROCEDURE — 85025 COMPLETE CBC W/AUTO DIFF WBC: CPT | Mod: HCNC

## 2020-07-06 PROCEDURE — 63600175 PHARM REV CODE 636 W HCPCS: Mod: HCNC | Performed by: EMERGENCY MEDICINE

## 2020-07-06 PROCEDURE — 83880 ASSAY OF NATRIURETIC PEPTIDE: CPT | Mod: HCNC

## 2020-07-06 RX ORDER — FUROSEMIDE 10 MG/ML
120 INJECTION INTRAMUSCULAR; INTRAVENOUS
Status: COMPLETED | OUTPATIENT
Start: 2020-07-06 | End: 2020-07-06

## 2020-07-06 RX ORDER — ALBUTEROL SULFATE 90 UG/1
2 AEROSOL, METERED RESPIRATORY (INHALATION) EVERY 6 HOURS PRN
Status: DISCONTINUED | OUTPATIENT
Start: 2020-07-06 | End: 2020-07-06 | Stop reason: HOSPADM

## 2020-07-06 RX ADMIN — LORAZEPAM 0.5 MG: 2 INJECTION INTRAMUSCULAR; INTRAVENOUS at 04:07

## 2020-07-06 RX ADMIN — ALBUTEROL SULFATE 2 PUFF: 108 INHALANT RESPIRATORY (INHALATION) at 08:07

## 2020-07-06 RX ADMIN — FUROSEMIDE 120 MG: 10 INJECTION, SOLUTION INTRAVENOUS at 04:07

## 2020-07-06 NOTE — ED PROVIDER NOTES
Encounter Date: 7/6/2020    SCRIBE #1 NOTE: I, Lanie Khan , am scribing for, and in the presence of,  Dr. Rdz. I have scribed the entire note.       History     Chief Complaint   Patient presents with    Shortness of Breath     pt with hx HD, COPD states she was at dialysis today and started to hyperventilate, was unable to complete dilaysis; reports increased sob today; chronic O2 at 2L NC      This is a 75 y.o. female who has a past medical history of CHF, CKD stage IV, COPD, and HTN.    The patient presents to the Emergency Department with shortness of breath.  Patient states she was 2 hours into dialysis today when onset of symptoms began.  She was unable to complete the full 3 hours.   Patient states she does not believe symptoms are subject to COPD, but rather a panic attack.   She reports similar episodes have been occurring frequently since starting dialysis.   Pt denies fever, cough, chest pain, headache, or any other concerning symptoms.   No aggravating or alleviating factors reported.  At baseline, patient is on 2L of at home oxygen.   Patient has had prior history of similar symptoms.     The history is provided by the patient.     Review of patient's allergies indicates:   Allergen Reactions    Aspirin      Other reaction(s): hx of ulcers    Tetracycline Swelling     Other reaction(s): Swelling    Penicillins Rash     Other reaction(s): Hives  Other reaction(s): Rash  Other reaction(s): Rash  Other reaction(s): Hives     Past Medical History:   Diagnosis Date    Acute congestive heart failure 2/10/2020    Anemia     Bilateral renal cysts     Cataract     Chronic LBP 7/26/2012    Chronic pain     CKD (chronic kidney disease), stage IV     COPD (chronic obstructive pulmonary disease)     Dehydration     Encounter for blood transfusion     HTN (hypertension)     Lumbar spondylosis     Melanoma     of the lip    Metabolic bone disease     Migraines, neuralgic     Osteoporosis      Primary osteoarthritis of both knees     s/p Rt TKA    Pulmonary embolism with infarction     Seizures 1972    x1 only    Subdeltoid bursitis, L>R. 9/27/2012    Ulcer     Vitamin D deficiency disease      Past Surgical History:   Procedure Laterality Date    BLADDER SUSPENSION      CATARACT EXTRACTION  11/18/13    left eye    CERVICAL LAMINECTOMY      x3, fusion x1    COLONOSCOPY  2009    FISTULOGRAM N/A 2/27/2020    Procedure: Fistulogram;  Surgeon: Noe Benitez MD;  Location: [facility] CATH LAB/EP;  Service: Cardiology;  Laterality: N/A;    HYSTERECTOMY      JOINT REPLACEMENT  2001    total right knee     LUMBAR LAMINECTOMY      x 3, fusion x1    OOPHORECTOMY      PERIPHERAL ANGIOGRAPHY N/A 3/9/2020    Procedure: Peripheral angiography;  Surgeon: Jacinto Henriquez MD;  Location: [facility] CATH LAB/EP;  Service: Cardiology;  Laterality: N/A;    PLACEMENT OF ARTERIOVENOUS GRAFT Left 1/21/2020    Procedure: INSERTION, GRAFT, ARTERIOVENOUS;  Surgeon: Lindsey Louie MD;  Location: [facility] OR;  Service: General;  Laterality: Left;    THROMBECTOMY Left 2/3/2020    Procedure: THROMBECTOMY;  Surgeon: Lindsey Louie MD;  Location: [facility] OR;  Service: General;  Laterality: Left;    THROMBECTOMY  3/9/2020    Procedure: Thrombectomy;  Surgeon: Jacinto Henriquez MD;  Location: [facility] CATH LAB/EP;  Service: Cardiology;;     Family History   Problem Relation Age of Onset    Arthritis Mother     Stroke Mother     Hypertension Father     Cancer Father     Cataracts Father     Diabetes Maternal Aunt     Hypertension Maternal Grandfather     Heart disease Maternal Grandfather     Heart attack Maternal Grandfather     Cataracts Sister     Glaucoma Cousin      Social History     Tobacco Use    Smoking status: Former Smoker     Types: Cigarettes    Smokeless tobacco: Former User     Quit date: 2/3/2015   Substance Use Topics    Alcohol use: Not Currently     Comment: Rare    Drug use: No     Review of  Systems   Constitutional: Negative for fever.   HENT: Negative.    Respiratory: Positive for shortness of breath. Negative for cough.    Cardiovascular: Negative for chest pain.   Gastrointestinal: Negative for abdominal pain, nausea and vomiting.   Genitourinary: Positive for decreased urine volume.   Musculoskeletal: Negative for arthralgias and myalgias.   Skin: Negative for wound.   Neurological: Negative for headaches.   Psychiatric/Behavioral: The patient is nervous/anxious.        Physical Exam     Initial Vitals [07/06/20 1449]   BP Pulse Resp Temp SpO2   (!) 157/72 (!) 111 (!) 26 99 °F (37.2 °C) 97 %      MAP       --         Physical Exam    Nursing note and vitals reviewed.  Constitutional: She is not diaphoretic. She appears distressed.   Thin, frail appearing   HENT:   Head: Normocephalic and atraumatic.   Dry oropharynx   Eyes: Conjunctivae and EOM are normal.   Neck: Normal range of motion.   Cardiovascular: Normal rate, regular rhythm and intact distal pulses. Exam reveals gallop.    Pulmonary/Chest: She is in respiratory distress. She has no wheezes. She has rales.   Musculoskeletal: Normal range of motion. No edema.   Neurological: She is alert and oriented to person, place, and time. She has normal strength.   Skin: Skin is warm and dry.   Psychiatric: Her behavior is normal. Thought content normal.   Patient appears mildly anxious         ED Course   Procedures  Labs Reviewed   CBC W/ AUTO DIFFERENTIAL   COMPREHENSIVE METABOLIC PANEL   TROPONIN I   B-TYPE NATRIURETIC PEPTIDE   PROTIME-INR          Imaging Results          X-Ray Chest AP Portable (In process)                  Medical Decision Making:   History:   Old Medical Records: I decided to obtain old medical records.  Clinical Tests:   Radiological Study: Ordered and Reviewed  Medical Tests: Ordered and Reviewed            Scribe Attestation:   Scribe #1: I performed the above scribed service and the documentation accurately describes the  services I performed. I attest to the accuracy of the note.            ED Course as of Jul 06 1614   Mon Jul 06, 2020   1549 I, Dr. Hood Rdz, personally performed the services described in this documentation.   All medical record entries made by the scribe were at my direction and in my presence.   I have reviewed the chart and agree that the record is accurate and complete.   Hood Rdz MD.       [NP]   1549 This is an emergent evaluation of a 75 y.o.female patient with presentation of shortness of breath while in dialysis, history of anxiety attacks.  Patient has rales on exam, respiratory distress    Initial differentials include but are not limited to:  CHF, pulmonary edema, fluid overload, COPD, anxiety, pleural effusion.     Plan:  Basic labs, BMP, troponin, chest x-ray, EKG, cardiac monitoring, oxygen as needed, benzodiazepine. Patient may require further dialysis      [NP]   1604 Case turned over to Dr. Toledo at 4:00 p.m. pending workup, re-evaluation and disposition.  Considering patient's age, abnormal vitals and comorbidities, would tend towards observation/admission.  However, if patient improves significantly, vital stable, could be discharged home.    [NP]   1604 EKG:  Normal sinus rhythm with sinus arrhythmia at 92 bpm, rightward axis, nl intervals, low voltage QRS, no ST-T changes as read by me (Dr. Rdz).  No STEMI.    [NP]      ED Course User Index  [NP] Hood Rdz MD                Clinical Impression:       ICD-10-CM ICD-9-CM   1. Acute pulmonary edema  J81.0 518.4   2. SOB (shortness of breath)  R06.02 786.05   3. Shortness of breath  R06.02 786.05   4. ESRD on hemodialysis  N18.6 585.6    Z99.2 V45.11   5. Anxiety attack  F41.0 300.01                                Hood Rdz MD  07/06/20 2180

## 2020-07-07 NOTE — ED NOTES
Confirmed w Dr. Toledo that we are NOT Covid screening at this time as patient is refusing to stay as an observation patient. Patient requesting MDI therapy prior to discharge, orders received and respiratory notified.

## 2020-07-07 NOTE — ED NOTES
Adult Physical Assessment  LOC: Katie Quevedo, 75 y.o. female verified via two identifiers.  The patient is awake, alert, oriented and speaking appropriately at this time. Minimal SOB appreciated.   APPEARANCE: Patient resting comfortably and appears to be in no acute distress at this time. Patient is clean and well groomed, patient's clothing is properly fastened.  SKIN:The skin is warm and dry, color consistent with ethnicity, patient has normal skin turgor and moist mucus membranes, skin intact, no breakdown or brusing noted.  MUSCULOSKELETAL: Patient moving all extremities well, no obvious swelling or deformities noted.  RESPIRATORY: Airway is open and patent, respirations are spontaneous, patient has an increased  effort and rate, mild accessory muscle use noted.  CARDIAC: Patient has a normal rate and rhythm, no periphreal edema noted in any extremity, capillary refill < 3 seconds in all extremities  ABDOMEN: Soft and non tender to palpation, no abdominal distention noted. Bowel sounds present in all four quadrants.  NEUROLOGIC: Eyes open spontaneously, behavior appropriate to situation, follows commands, facial expression symmetrical, bilateral hand grasp equal and even, purposeful motor response noted, normal sensation in all extremities when touched with a finger.

## 2020-07-07 NOTE — PROVIDER PROGRESS NOTES - EMERGENCY DEPT.
Encounter Date: 7/6/2020    ED Physician Progress Notes           ED Course: I, Chad Toledo MD have assumed care of this patient from Dr. Rdz . Patient is a 75-year-old past medical history of CHF, ESRD anxiety presenting with worsening shortness of breath.  While getting dialysis.  Patient finished half of session when she acutely became short of breath.  As per patient related to possible panic episode.    At the time of signout plan was pending  laboratory work and x-ray..    Noted on x-ray patient has reaccumulation of left-sided pleural effusion.  Patient had previously tap pleural effusion last month 06/2020.  Possible relation to current shortness of breath status.  Discussed with patient extensively need for admission probable dialysis and therapeutic thoracentesis considering patient remains tachypneic.  Patient does not want to stay in the hospital mentions she will come back another time as she has other business to take care of.  Discussed with patient risk of leaving including worsening shortness of breath, lightheadedness, syncope, also discussed other options including bedside tap while in the emergency room.  Patient elected to obtain bedside thoracentesis while in the ED. Performed bedside thoracentesis with 300-400cc yellow translucent fluid.  Plan for further observation while in the emergency room follow-up with PCP.  Reassess  7:21 PM    Pt mentions having mild improvement in SOB. Appears dyspneic, will repeat CXR and reassess.   7:42 PM    XRAy w/o evidence of pneumothorax, mild improvement.  Discussed with patient need for likely dialysis and diuresis.  Patient does not want to stay in emergency room like to follow-up with PCP.  Discussed risks of leaving including worsening shortness of breath, worsening fluid overload, patient would like to leave AMA will DC home.  8:15 PM    .        Medications given in the ED:    Medications   lorazepam (ATIVAN) injection 0.5 mg (0.5 mg Intravenous  Given 7/6/20 1600)   furosemide injection 120 mg (120 mg Intravenous Given 7/6/20 1612)       Disposition: Stable      Impression:  Pulmonary edema, Shortness of breath

## 2020-07-07 NOTE — PROCEDURES - EMERGENCY DEPT.
"ED Procedure Notes:   Thoracentesis    Date/Time: 2020 7:23 PM  Location procedure was performed: [facility] EMERGENCY DEPARTMENT  Performed by: Chad Toledo Jr., MD  Authorized by: Hood Rdz MD   Consent Done: Yes  Consent: Verbal consent obtained. Written consent obtained.  Risks and benefits: risks, benefits and alternatives were discussed  Consent given by: patient  Patient understanding: patient states understanding of the procedure being performed  Patient consent: the patient's understanding of the procedure matches consent given  Procedure consent: procedure consent matches procedure scheduled  Relevant documents: relevant documents present and verified  Test results: test results available and properly labeled  Site marked: the operative site was marked  Imaging studies: imaging studies available  Required items: required blood products, implants, devices, and special equipment available  Patient identity confirmed: , MRN, name, provided demographic data and verbally with patient  Time out: Immediately prior to procedure a "time out" was called to verify the correct patient, procedure, equipment, support staff and site/side marked as required.  Procedure purpose: therapeutic  Indications: pleural effusion  Preparation: Patient was prepped and draped in the usual sterile fashion.  Local anesthesia used: yes  Anesthesia: local infiltration    Anesthesia:  Local anesthesia used: yes  Local Anesthetic: lidocaine 1% without epinephrine  Anesthetic total: 4 mL  Patient sedated: no  Preparation: skin prepped with ChloraPrep  Patient position: sitting  Ultrasound guidance: yes  Location: left posterior  Intercostal space: 7th  Puncture method: over-the-needle catheter  Needle size: 20  Number of attempts: 1  Drainage amount: 400 ml  Drainage characteristics: serous  Patient tolerance: Patient tolerated the procedure well with no immediate complications  Chest x-ray performed: no  Complications: " No  Estimated blood loss (mL): 10  Specimens: No  Implants: No

## 2020-07-16 NOTE — PROGRESS NOTES
7/23/18-RN CM called patient for follow up. Spoke with patient reported that she is doing well. Reported that her breathing is ok. Stated that she does get SOB with exertion, which is normal for her. Inquired about attendance to appt with Dr. Donato. Patient reported that she forgot about this appt and would like assistance with rescheduling. DANIEL WESLEY Contacted Dr. Donato's office, obtained appt for patient on Wed Aug 8th at 12:40PM. RN CM called patient back and provided her with Appt information. Patient appreciative of assistance. No further needs identified. Discussed case closure with patient, who is in agreement. Encouraged patient to contact OPCM RN LUPILLO in the event that needs develop. Verbalized understanding. Will close case at this time.    Patient is requesting a return call regarding instructions for a stitz bath. Patient states she did not receive instructions upon discharge from hospital. Please advise. Thank you!

## 2020-07-30 DIAGNOSIS — M54.2 CHRONIC NECK PAIN: ICD-10-CM

## 2020-07-30 DIAGNOSIS — G89.29 CHRONIC NECK PAIN: ICD-10-CM

## 2020-07-30 DIAGNOSIS — G89.29 CHRONIC MIDLINE LOW BACK PAIN WITH RIGHT-SIDED SCIATICA: ICD-10-CM

## 2020-07-30 DIAGNOSIS — M16.0 PRIMARY OSTEOARTHRITIS OF BOTH HIPS: ICD-10-CM

## 2020-07-30 DIAGNOSIS — M54.41 CHRONIC MIDLINE LOW BACK PAIN WITH RIGHT-SIDED SCIATICA: ICD-10-CM

## 2020-07-30 RX ORDER — HYDROCODONE BITARTRATE AND ACETAMINOPHEN 10; 325 MG/1; MG/1
1 TABLET ORAL 3 TIMES DAILY PRN
Qty: 90 TABLET | Refills: 0 | Status: SHIPPED | OUTPATIENT
Start: 2020-07-30 | End: 2020-07-31 | Stop reason: SDUPTHER

## 2020-07-30 NOTE — TELEPHONE ENCOUNTER
Last Rx refill-----06/23/20  Last office visit--pt no show 07/28/20 appt  Next office visit--          ----- Message from Pallavi Byers sent at 7/30/2020 11:12 AM CDT -----  Regarding: self  Pt is requesting a refill on the hydrocodone

## 2020-07-31 DIAGNOSIS — M54.41 CHRONIC MIDLINE LOW BACK PAIN WITH RIGHT-SIDED SCIATICA: ICD-10-CM

## 2020-07-31 DIAGNOSIS — G89.29 CHRONIC NECK PAIN: ICD-10-CM

## 2020-07-31 DIAGNOSIS — M54.2 CHRONIC NECK PAIN: ICD-10-CM

## 2020-07-31 DIAGNOSIS — G89.29 CHRONIC MIDLINE LOW BACK PAIN WITH RIGHT-SIDED SCIATICA: ICD-10-CM

## 2020-07-31 DIAGNOSIS — M16.0 PRIMARY OSTEOARTHRITIS OF BOTH HIPS: ICD-10-CM

## 2020-07-31 RX ORDER — HYDROCODONE BITARTRATE AND ACETAMINOPHEN 10; 325 MG/1; MG/1
1 TABLET ORAL 3 TIMES DAILY PRN
Qty: 90 TABLET | Refills: 0 | Status: SHIPPED | OUTPATIENT
Start: 2020-07-31 | End: 2020-08-26 | Stop reason: SDUPTHER

## 2020-07-31 NOTE — TELEPHONE ENCOUNTER
Please resend Rx Hydrocodone to [facility] pharmacy.    [id number] - [place],        ----- Message from Uzair Kim sent at 7/31/2020  9:26 AM CDT -----  Contact: Patient @[phone]  Rx Refill/Request     Is this a Refill or New Rx:  Yes   Rx Name and Strength:  HYDROcodone-acetaminophen (NORCO)  mg per tablet  Preferred Pharmacy with phone number:     [id number] - [place] - [id number]  [address]  [place] [address]  Phone: [phone] Fax: [phone]

## 2020-08-06 DIAGNOSIS — F41.9 ANXIETY: ICD-10-CM

## 2020-08-06 NOTE — H&P (VIEW-ONLY)
I have reviewed discharge instructions with the patient. The patient verbalized understanding. [facility]  Cardiology  Consult Note    Patient Name: Katie Quevedo  MRN: [id number]  Admission Date: 2/23/2020  Hospital Length of Stay: 2 days  Code Status: Full Code   Attending Provider: Jennifer Bowles*   Consulting Provider: Prasad Nicole NP  Primary Care Physician: Kingston Verduzco MD  Principal Problem:Opioid overdose    Patient information was obtained from spouse/SO, past medical records and ER records.     Inpatient consult to [facility]  Consult performed by: Prasad Nicole NP  Consult ordered by: Kandy Man NP        Subjective:     Chief Complaint:  AVG     HPI:   HPI retrieved from chart and spouse at bedside. Patient lethargic.   Katie Quevedo is a 73 y/o female with PMHx of COPD on 2 liters home oxygen at baseline, chronic pain, lumbar spondylosis, vitamin D deficiency dx, anemia, migraines, osteoarthritis, HTN,  PE with infarction, CKD stage IV, seizures, lumbar spondylosis, pulmonary embolism and acute CHF. Patient presented to the ER with  AMS. Her  notes that she had eaten dinner well without any problems and later became unresponsive. Patient recently started on dialysis and has not dialyzed in 1 week since her dialysis access has not been functioning.  Patient was scheduled to have her access de-clotted 2/24 AM.  Patient was evaluated by Dr Louie who noted functioning AVG, mild venous hypertension,  Recommend consult  for fistulogram and ballooning     Past Medical History:   Diagnosis Date    Acute congestive heart failure 2/10/2020    Anemia     Bilateral renal cysts     Cataract     Chronic LBP 7/26/2012    Chronic pain     CKD (chronic kidney disease), stage IV     COPD (chronic obstructive pulmonary disease)     Dehydration     Encounter for blood transfusion     HTN (hypertension)     Lumbar spondylosis     Melanoma     of the lip    Metabolic bone disease     Migraines, neuralgic     Osteoporosis     Primary  osteoarthritis of both knees     s/p Rt TKA    Pulmonary embolism with infarction     Seizures 1972    x1 only    Subdeltoid bursitis, L>R. 9/27/2012    Ulcer     Vitamin D deficiency disease        Past Surgical History:   Procedure Laterality Date    BLADDER SUSPENSION      CATARACT EXTRACTION  11/18/13    left eye    CERVICAL LAMINECTOMY      x3, fusion x1    COLONOSCOPY  2009    HYSTERECTOMY      JOINT REPLACEMENT  2001    total right knee     LUMBAR LAMINECTOMY      x 3, fusion x1    OOPHORECTOMY      PLACEMENT OF ARTERIOVENOUS GRAFT Left 1/21/2020    Procedure: INSERTION, GRAFT, ARTERIOVENOUS;  Surgeon: Lindsey Louie MD;  Location: [facility] OR;  Service: General;  Laterality: Left;    THROMBECTOMY Left 2/3/2020    Procedure: THROMBECTOMY;  Surgeon: Lindsey Louie MD;  Location: [facility] OR;  Service: General;  Laterality: Left;       Review of patient's allergies indicates:   Allergen Reactions    Aspirin      Other reaction(s): hx of ulcers    Tetracycline Swelling     Other reaction(s): Swelling    Penicillins Rash     Other reaction(s): Hives  Other reaction(s): Rash  Other reaction(s): Rash  Other reaction(s): Hives       No current facility-administered medications on file prior to encounter.      Current Outpatient Medications on File Prior to Encounter   Medication Sig    albuterol-ipratropium (DUO-NEB) 2.5 mg-0.5 mg/3 mL nebulizer solution Take 3 mLs by nebulization every 6 (six) hours as needed for Shortness of Breath. Rescue    allopurinoL (ZYLOPRIM) 100 MG tablet Take 1 tablet (100 mg total) by mouth on Tuesday, Thursday, Saturday, and Sunday.    amLODIPine (NORVASC) 2.5 MG tablet Take 2.5 mg by mouth once daily.     busPIRone (BUSPAR) 15 MG tablet Take 1 tablet (15 mg total) by mouth 2 (two) times daily.    clonazePAM (KLONOPIN) 1 MG tablet Take 1 tablet (1 mg total) by mouth 2 (two) times daily as needed for Anxiety.    cyproheptadine (PERIACTIN) 4 mg tablet Take 1  tablet (4 mg total) by mouth every 8 (eight) hours.    diphenhydrAMINE (BENADRYL) 25 mg capsule Take 25 mg by mouth every 6 (six) hours as needed for Itching.    ergocalciferol (ERGOCALCIFEROL) 50,000 unit Cap Take 1 capsule by mouth once weekly for 10 weeks, then take 1 capsule by mouth once monthly.    fluticasone-umeclidin-vilanter (TRELEGY ELLIPTA) 100-62.5-25 mcg DsDv Inhale 1 puff by mouth into the lungs once daily.    furosemide (LASIX) 80 MG tablet Take 1 tablet (80 mg total) by mouth twice daily on non dialysis days Tuesday, Thursday, Saturday, and Sunday.    furosemide (LASIX) 80 MG tablet Take 1 tablet (80 mg total) by mouth 2 (two) times daily.    HYDROcodone-acetaminophen (NORCO)  mg per tablet Take 1 tablet by mouth 3 (three) times daily as needed.    levoFLOXacin (LEVAQUIN) 750 MG tablet Take 1 tablet (750 mg total) by mouth once daily.    lidocaine-prilocaine (EMLA) cream Apply topically to left arm prior to dialysis.    mupirocin (BACTROBAN) 2 % ointment apply by Nasal route 2 (two) times daily.    ondansetron (ZOFRAN-ODT) 4 MG TbDL Dissolve one tablet under the tongue every 6 (six) hours as needed.    oseltamivir (TAMIFLU) 30 MG capsule Take 1 capsule (30 mg total) by mouth once daily. for 4 days    potassium chloride SA (K-DUR,KLOR-CON) 20 MEQ tablet Take 2 tablets (40 mEq total) by mouth once daily.    sodium bicarbonate 650 MG tablet Take 1 tablet (650 mg total) by mouth 2 (two) times daily.    traZODone (DESYREL) 100 MG tablet Take 100 mg by mouth nightly as needed for Insomnia.    zolpidem (AMBIEN) 5 MG Tab Take 5 mg by mouth every evening.      Family History     Problem Relation (Age of Onset)    Arthritis Mother    Cancer Father    Cataracts Father, Sister    Diabetes Maternal Aunt    Glaucoma Cousin    Heart attack Maternal Grandfather    Heart disease Maternal Grandfather    Hypertension Father, Maternal Grandfather    Stroke Mother        Tobacco Use    Smoking  status: Former Smoker     Packs/day: 1.00     Types: Cigarettes    Smokeless tobacco: Former User     Quit date: 2/3/2015   Substance and Sexual Activity    Alcohol use: Not Currently     Comment: Rare    Drug use: No    Sexual activity: Never     Partners: Male     Review of Systems   Unable to perform ROS: acuity of condition     Objective:     Vital Signs (Most Recent):  Temp: 97.9 °F (36.6 °C) (02/26/20 1000)  Pulse: 104 (02/26/20 1130)  Resp: (!) 27 (02/26/20 1130)  BP: (!) 120/56 (02/26/20 1130)  SpO2: (!) 92 % (02/26/20 1130) Vital Signs (24h Range):  Temp:  [97.8 °F (36.6 °C)-98.6 °F (37 °C)] 97.9 °F (36.6 °C)  Pulse:  [] 104  Resp:  [24-52] 27  SpO2:  [88 %-99 %] 92 %  BP: ()/(38-83) 120/56     Weight: 58.9 kg (129 lb 13.6 oz)  Body mass index is 21.61 kg/m².    SpO2: (!) 92 %  O2 Device (Oxygen Therapy): Vapotherm      Intake/Output Summary (Last 24 hours) at 2/26/2020 1150  Last data filed at 2/26/2020 0600  Gross per 24 hour   Intake 1165 ml   Output 550 ml   Net 615 ml       Lines/Drains/Airways     Drain                 Hemodialysis AV Graft Left upper arm -- days    Female External Urinary Catheter 02/25/20 1415 less than 1 day          Airway                 Airway - Non-Surgical 02/03/20 1252 Nasal Cannula 22 days          Peripheral Intravenous Line                 Peripheral IV - Single Lumen 02/25/20 2009 22 G Anterior;Right Wrist less than 1 day                Physical Exam   Constitutional: No distress.   HENT:   Head: Atraumatic.   Eyes: Right eye exhibits no discharge. Left eye exhibits no discharge.   Neck: No JVD present.   Cardiovascular: Normal rate, regular rhythm and normal heart sounds. Exam reveals no gallop and no friction rub.   No murmur heard.  Pulmonary/Chest: Effort normal. She has decreased breath sounds. She has no rales.   Abdominal: Soft. Bowel sounds are normal.   Musculoskeletal: She exhibits no edema.   Skin: Skin is warm and dry. She is not diaphoretic.        Significant Labs:   CMP   Recent Labs   Lab 02/25/20  1047 02/26/20  0427    133*  133*  133*   K 4.0 3.6  3.6  3.6   CL 96 92*  92*  92*   CO2 30* 30*  30*  30*   * 208*  208*  208*   BUN 23 27*  27*  27*   CREATININE 1.9* 2.5*  2.5*  2.5*   CALCIUM 10.7* 10.0  10.0  10.0   ANIONGAP 12 11  11  11   ESTGFRAFRICA 29* 21*  21*  21*   EGFRNONAA 25* 18*  18*  18*   , CBC   Recent Labs   Lab 02/24/20  1641   HCT 32*   , INR No results for input(s): INR, PROTIME in the last 48 hours., Lipid Panel No results for input(s): CHOL, HDL, LDLCALC, TRIG, CHOLHDL in the last 48 hours., Troponin No results for input(s): TROPONINI in the last 48 hours. and All pertinent lab results from the last 24 hours have been reviewed.    Significant Imaging: Echocardiogram:   Transthoracic echo (TTE) complete (Cupid Only):   Results for orders placed or performed during the hospital encounter of 09/13/19   Echo Color Flow Doppler? Yes; Bubble Contrast? No   Result Value Ref Range    BSA 1.64 m2    LA WIDTH 2.30 cm    AORTIC VALVE CUSP SEPERATION 1.67 cm    PV PEAK VELOCITY 0.96 cm/s    LVIDD 3.48 (A) 3.5 - 6.0 cm    IVS 0.42 (A) 0.6 - 1.1 cm    PW 0.61 0.6 - 1.1 cm    Ao root annulus 2.19 cm    LVIDS 2.28 2.1 - 4.0 cm    FS 34 28 - 44 %    LA volume 36.54 cm3    LV mass 41.86 g    LA size 4.04 cm    Left Ventricle Relative Wall Thickness 0.35 cm    AV mean gradient 5 mmHg    AV Velocity Ratio 0.92     AV index (prosthetic) 1.09     E/A ratio 2.21     E wave decelartion time 112.59 msec    IVRT 0.07 msec    LVOT peak landon 1.40 m/s    LVOT peak VTI 30.41 cm    Ao peak landon 1.52 m/s    Ao VTI 27.98 cm    AV peak gradient 9 mmHg    MV Peak E Landon 1.17 m/s    MV Peak A Landon 0.53 m/s    LV Systolic Volume 17.67 mL    LV Systolic Volume Index 11.2 mL/m2    LV Diastolic Volume 50.14 mL    LV Diastolic Volume Index 31.68 mL/m2    LA Volume Index 23.1 mL/m2    LV Mass Index 26 g/m2    RA Major Axis 3.75 cm    Left  Atrium Minor Axis 4.94 cm    Left Atrium Major Axis 4.35 cm    Right Atrial Pressure (from IVC) 8 mmHg    Narrative    · Normal left ventricular systolic function. The estimated ejection   fraction is 55%  · Grade II (moderate) left ventricular diastolic dysfunction consistent   with pseudonormalization.  · Elevated left atrial pressure.  · No wall motion abnormalities.  · Normal right ventricular systolic function.  · No TR Jet to calculate PA pressure  · Intermediate central venous pressure (8 mm Hg).  · There is a large left pleural effusion.        Assessment and Plan:     ESRD (end stage renal disease) on dialysis    US:  Inflow arterial velocity is 143 centimeters/second.  Proximal anastomosis velocity is 418 centimeters/second.  Proximal AVG velocity is 122 centimeters/second.  Mid AVG flow velocity is 139 centimeters/second, 790 mL per minute  Distal AVG velocity is 102 centimeters/second.  Distal anastomosis velocity is 365 centimeters/second.  Outflow vein velocity is 61.9 centimeters/second.  Axillary vein velocity is 37.3 centimeters/second.  Subclavian vein velocity is 56.5 centimeters/second.    Patient new to HD   AVG placed 01/21/2020  Venous HTN noted during HD but able to receive full runs  NPO p MN for fistulagram with Dr Benitez         VTE Risk Mitigation (From admission, onward)         Ordered     IP VTE HIGH RISK PATIENT  Once      02/24/20 0343     Place sequential compression device  Until discontinued      02/24/20 0343     Place DAWN hose  Until discontinued      02/24/20 0056                Thank you for your consult. I will follow-up with patient. Please contact us if you have any additional questions.    Prasad Nicole NP  Cardiology   [facility]

## 2020-08-06 NOTE — TELEPHONE ENCOUNTER
----- Message from Rosalva Peñaloza sent at 8/6/2020  2:24 PM CDT -----  Regarding: refill meds  Contact: Pt  Type:  RX Refill Request    Who Called:Pt    RX Name and Strength: clonazePAM (KLONOPIN) 1 MG tablet    How is the patient currently taking it? (ex. 1XDay): 1xday    Is this a 30 day or 90 day RX: 30    Preferred Pharmacy with phone number:[facility] [id number] [place] [address], [phone]    Local or Mail Order local    Ordering Provider: DR Verduzco    Would the patient rather a call back or a response via [facility]? Call back  Best Call Back Number:[phone]    Additional Information:n/a

## 2020-08-07 DIAGNOSIS — F41.9 ANXIETY: ICD-10-CM

## 2020-08-07 RX ORDER — CLONAZEPAM 1 MG/1
1 TABLET ORAL DAILY PRN
Qty: 28 TABLET | Refills: 1 | OUTPATIENT
Start: 2020-08-07

## 2020-08-07 RX ORDER — CLONAZEPAM 1 MG/1
1 TABLET ORAL DAILY PRN
Qty: 20 TABLET | Refills: 1 | Status: SHIPPED | OUTPATIENT
Start: 2020-08-07 | End: 2020-09-28

## 2020-08-26 DIAGNOSIS — M54.41 CHRONIC MIDLINE LOW BACK PAIN WITH RIGHT-SIDED SCIATICA: ICD-10-CM

## 2020-08-26 DIAGNOSIS — M16.0 PRIMARY OSTEOARTHRITIS OF BOTH HIPS: ICD-10-CM

## 2020-08-26 DIAGNOSIS — G89.29 CHRONIC NECK PAIN: ICD-10-CM

## 2020-08-26 DIAGNOSIS — M54.2 CHRONIC NECK PAIN: ICD-10-CM

## 2020-08-26 DIAGNOSIS — G89.29 CHRONIC MIDLINE LOW BACK PAIN WITH RIGHT-SIDED SCIATICA: ICD-10-CM

## 2020-08-26 RX ORDER — HYDROCODONE BITARTRATE AND ACETAMINOPHEN 10; 325 MG/1; MG/1
1 TABLET ORAL 3 TIMES DAILY PRN
Qty: 90 TABLET | Refills: 0 | Status: SHIPPED | OUTPATIENT
Start: 2020-08-30 | End: 2020-09-25 | Stop reason: SDUPTHER

## 2020-08-26 NOTE — TELEPHONE ENCOUNTER
Last Rx refill-----07/31/20  Last office visit--11/04/19  Next office visit-- patient no show-07/28/20

## 2020-08-26 NOTE — TELEPHONE ENCOUNTER
----- Message from Summer Gill sent at 8/26/2020  8:39 AM CDT -----  Regarding: refill  Contact: pt @ [phone]  Rx Refill/Request     Is this a Refill or New Rx:  refill    Rx Name and Strength:  HYDROcodone-acetaminophen (NORCO)  mg per tablet    Preferred Pharmacy with phone number:     [id number]  [place] - [address] [facility]  [address]  [place] [address]  Phone: [phone] Fax: [phone]    Communication Preference:  Additional Information:

## 2020-09-23 ENCOUNTER — TELEPHONE (OUTPATIENT)
Dept: PRIMARY CARE CLINIC | Facility: CLINIC | Age: 75
End: 2020-09-23

## 2020-09-23 NOTE — TELEPHONE ENCOUNTER
----- Message from Beth Mccormick sent at 9/23/2020 11:06 AM CDT -----  Contact: [phone] ext [phone]  Rae lozano/ Patient Care is calling in regards to Katie Quevedo calling regarding needing to speak with the Office.  Rae lozano/ Pt care stated that she have made several attempts to speak with the Office in regards to the pt.      Please call and advise

## 2020-09-25 ENCOUNTER — TELEPHONE (OUTPATIENT)
Dept: PHYSICAL MEDICINE AND REHAB | Facility: CLINIC | Age: 75
End: 2020-09-25

## 2020-09-25 DIAGNOSIS — M54.41 CHRONIC MIDLINE LOW BACK PAIN WITH RIGHT-SIDED SCIATICA: ICD-10-CM

## 2020-09-25 DIAGNOSIS — G89.29 CHRONIC NECK PAIN: ICD-10-CM

## 2020-09-25 DIAGNOSIS — G89.29 CHRONIC MIDLINE LOW BACK PAIN WITH RIGHT-SIDED SCIATICA: ICD-10-CM

## 2020-09-25 DIAGNOSIS — M16.0 PRIMARY OSTEOARTHRITIS OF BOTH HIPS: ICD-10-CM

## 2020-09-25 DIAGNOSIS — M54.2 CHRONIC NECK PAIN: ICD-10-CM

## 2020-09-25 RX ORDER — HYDROCODONE BITARTRATE AND ACETAMINOPHEN 10; 325 MG/1; MG/1
1 TABLET ORAL 3 TIMES DAILY PRN
Qty: 90 TABLET | Refills: 0 | Status: SHIPPED | OUTPATIENT
Start: 2020-09-25 | End: 2020-10-27 | Stop reason: SDUPTHER

## 2020-09-25 NOTE — TELEPHONE ENCOUNTER
----- Message from Ish Ahn sent at 9/25/2020 11:06 AM CDT -----  Regarding: REFILL  Contact: self  Rx Refill/Request     Is this a Refill or New Rx:  REFILL    Rx Name and Strength:  HYDROcodone-acetaminophen (NORCO)  mg per tablet    Preferred Pharmacy with phone number: [facility]    Communication Preference: [phone] (home)     Additional Information: Pt ask for a call

## 2020-10-01 ENCOUNTER — TELEPHONE (OUTPATIENT)
Dept: FAMILY MEDICINE | Facility: CLINIC | Age: 75
End: 2020-10-01

## 2020-10-01 NOTE — TELEPHONE ENCOUNTER
----- Message from Leanne Russo sent at 10/1/2020  1:30 PM CDT -----  Contact: Rae @ [facility]-[phone] ext [phone]  Type:  Needs Medical Advice    Who Called: Rae @ [facility]  Reason for call: regarding a Referral Package that was sent to the Office regarding the Pt's Nutrition request for being a Dialysis pt  Would the patient rather a call back or a response via [facility]?  Call Back  Best Call Back Number: [phone] ext [phone]

## 2020-10-13 ENCOUNTER — TELEPHONE (OUTPATIENT)
Dept: FAMILY MEDICINE | Facility: CLINIC | Age: 75
End: 2020-10-13

## 2020-10-13 NOTE — TELEPHONE ENCOUNTER
Spoke with  Rae lozano/ [facility] who is requesting to have paperwork which has been faxed over to clinic sent from office . I informed her  is not following patient with [facility] . We will be unable to send over paperwork . I did advised her I will bring it to 's attention she voiced understanding

## 2020-10-16 ENCOUNTER — TELEPHONE (OUTPATIENT)
Dept: INTERNAL MEDICINE | Facility: CLINIC | Age: 75
End: 2020-10-16

## 2020-10-16 NOTE — TELEPHONE ENCOUNTER
----- Message from Oriana Brantley sent at 10/15/2020 11:28 AM CDT -----  Contact: Rae [phone]  Rae would like to speak with you about a personal matter. Please advise

## 2020-10-27 DIAGNOSIS — G89.29 CHRONIC NECK PAIN: ICD-10-CM

## 2020-10-27 DIAGNOSIS — M54.41 CHRONIC MIDLINE LOW BACK PAIN WITH RIGHT-SIDED SCIATICA: ICD-10-CM

## 2020-10-27 DIAGNOSIS — M16.0 PRIMARY OSTEOARTHRITIS OF BOTH HIPS: ICD-10-CM

## 2020-10-27 DIAGNOSIS — G89.29 CHRONIC MIDLINE LOW BACK PAIN WITH RIGHT-SIDED SCIATICA: ICD-10-CM

## 2020-10-27 DIAGNOSIS — M54.2 CHRONIC NECK PAIN: ICD-10-CM

## 2020-10-27 RX ORDER — HYDROCODONE BITARTRATE AND ACETAMINOPHEN 10; 325 MG/1; MG/1
1 TABLET ORAL 3 TIMES DAILY PRN
Qty: 90 TABLET | Refills: 0 | Status: SHIPPED | OUTPATIENT
Start: 2020-10-28 | End: 2020-12-04 | Stop reason: SDUPTHER

## 2020-10-27 NOTE — TELEPHONE ENCOUNTER
----- Message from Annette Ayon sent at 10/27/2020 12:03 PM CDT -----  Pt is requesting a refill on medication:  -HYDROCODONE     Did not find this script in pt's chart, however she states the name as her pain medication. Please call once script is sent over.    Contact Kaila [phone] (home)

## 2020-11-05 ENCOUNTER — TELEPHONE (OUTPATIENT)
Dept: FAMILY MEDICINE | Facility: CLINIC | Age: 75
End: 2020-11-05

## 2020-11-05 NOTE — TELEPHONE ENCOUNTER
----- Message from Oriana Brantley sent at 11/5/2020  2:25 PM CST -----  Contact: Rebecca [phone]  Rebecca would like to speak with you about needing information on the patient. Please advise

## 2020-11-09 ENCOUNTER — HOSPITAL ENCOUNTER (OUTPATIENT)
Facility: HOSPITAL | Age: 75
Discharge: HOME | End: 2020-11-12
Attending: EMERGENCY MEDICINE | Admitting: FAMILY MEDICINE
Payer: MEDICARE

## 2020-11-09 DIAGNOSIS — I50.9 ACUTE ON CHRONIC HEART FAILURE, UNSPECIFIED HEART FAILURE TYPE: ICD-10-CM

## 2020-11-09 DIAGNOSIS — I50.9 CHF (CONGESTIVE HEART FAILURE): ICD-10-CM

## 2020-11-09 DIAGNOSIS — I50.9 ACUTE ON CHRONIC HEART FAILURE: Primary | ICD-10-CM

## 2020-11-09 DIAGNOSIS — J44.9 CHRONIC OBSTRUCTIVE PULMONARY DISEASE, UNSPECIFIED COPD TYPE: ICD-10-CM

## 2020-11-09 DIAGNOSIS — R06.02 SOB (SHORTNESS OF BREATH): ICD-10-CM

## 2020-11-09 PROBLEM — J90 PLEURAL EFFUSION: Status: ACTIVE | Noted: 2020-11-09

## 2020-11-09 LAB
ALBUMIN SERPL BCP-MCNC: 3.9 G/DL (ref 3.5–5.2)
ALP SERPL-CCNC: 317 U/L (ref 55–135)
ALT SERPL W/O P-5'-P-CCNC: 14 U/L (ref 10–44)
ANION GAP SERPL CALC-SCNC: 12 MMOL/L (ref 8–16)
AST SERPL-CCNC: 26 U/L (ref 10–40)
BASOPHILS # BLD AUTO: 0.08 K/UL (ref 0–0.2)
BASOPHILS NFR BLD: 0.9 % (ref 0–1.9)
BILIRUB SERPL-MCNC: 1.2 MG/DL (ref 0.1–1)
BNP SERPL-MCNC: 364 PG/ML (ref 0–99)
BUN SERPL-MCNC: 27 MG/DL (ref 8–23)
CALCIUM SERPL-MCNC: 9.3 MG/DL (ref 8.7–10.5)
CHLORIDE SERPL-SCNC: 101 MMOL/L (ref 95–110)
CO2 SERPL-SCNC: 24 MMOL/L (ref 23–29)
CREAT SERPL-MCNC: 1.3 MG/DL (ref 0.5–1.4)
DIFFERENTIAL METHOD: ABNORMAL
EOSINOPHIL # BLD AUTO: 0.3 K/UL (ref 0–0.5)
EOSINOPHIL NFR BLD: 2.9 % (ref 0–8)
ERYTHROCYTE [DISTWIDTH] IN BLOOD BY AUTOMATED COUNT: 15.6 % (ref 11.5–14.5)
EST. GFR  (AFRICAN AMERICAN): 46 ML/MIN/1.73 M^2
EST. GFR  (NON AFRICAN AMERICAN): 40 ML/MIN/1.73 M^2
GLUCOSE SERPL-MCNC: 80 MG/DL (ref 70–110)
HCT VFR BLD AUTO: 32.7 % (ref 37–48.5)
HGB BLD-MCNC: 10.4 G/DL (ref 12–16)
IMM GRANULOCYTES # BLD AUTO: 0.03 K/UL (ref 0–0.04)
IMM GRANULOCYTES NFR BLD AUTO: 0.3 % (ref 0–0.5)
LYMPHOCYTES # BLD AUTO: 1.5 K/UL (ref 1–4.8)
LYMPHOCYTES NFR BLD: 15.8 % (ref 18–48)
MCH RBC QN AUTO: 29.5 PG (ref 27–31)
MCHC RBC AUTO-ENTMCNC: 31.8 G/DL (ref 32–36)
MCV RBC AUTO: 93 FL (ref 82–98)
MONOCYTES # BLD AUTO: 1 K/UL (ref 0.3–1)
MONOCYTES NFR BLD: 10.4 % (ref 4–15)
NEUTROPHILS # BLD AUTO: 6.4 K/UL (ref 1.8–7.7)
NEUTROPHILS NFR BLD: 69.7 % (ref 38–73)
NRBC BLD-RTO: 0 /100 WBC
PLATELET # BLD AUTO: 239 K/UL (ref 150–350)
PMV BLD AUTO: 10.2 FL (ref 9.2–12.9)
POTASSIUM SERPL-SCNC: 4.3 MMOL/L (ref 3.5–5.1)
PROT SERPL-MCNC: 7.9 G/DL (ref 6–8.4)
RBC # BLD AUTO: 3.53 M/UL (ref 4–5.4)
SARS-COV-2 RDRP RESP QL NAA+PROBE: NEGATIVE
SODIUM SERPL-SCNC: 137 MMOL/L (ref 136–145)
TROPONIN I SERPL DL<=0.01 NG/ML-MCNC: 0.01 NG/ML (ref 0–0.03)
WBC # BLD AUTO: 9.2 K/UL (ref 3.9–12.7)

## 2020-11-09 PROCEDURE — 80053 COMPREHEN METABOLIC PANEL: CPT | Mod: HCNC

## 2020-11-09 PROCEDURE — G0378 HOSPITAL OBSERVATION PER HR: HCPCS | Mod: HCNC

## 2020-11-09 PROCEDURE — U0002 COVID-19 LAB TEST NON-CDC: HCPCS | Mod: HCNC

## 2020-11-09 PROCEDURE — 63600175 PHARM REV CODE 636 W HCPCS: Mod: HCNC | Performed by: EMERGENCY MEDICINE

## 2020-11-09 PROCEDURE — 85025 COMPLETE CBC W/AUTO DIFF WBC: CPT | Mod: HCNC

## 2020-11-09 PROCEDURE — 96374 THER/PROPH/DIAG INJ IV PUSH: CPT

## 2020-11-09 PROCEDURE — 83880 ASSAY OF NATRIURETIC PEPTIDE: CPT | Mod: HCNC

## 2020-11-09 PROCEDURE — 84484 ASSAY OF TROPONIN QUANT: CPT | Mod: HCNC

## 2020-11-09 PROCEDURE — 99285 EMERGENCY DEPT VISIT HI MDM: CPT | Mod: 25,HCNC

## 2020-11-09 RX ORDER — ONDANSETRON 2 MG/ML
4 INJECTION INTRAMUSCULAR; INTRAVENOUS EVERY 6 HOURS PRN
Status: DISCONTINUED | OUTPATIENT
Start: 2020-11-09 | End: 2020-11-12 | Stop reason: HOSPADM

## 2020-11-09 RX ORDER — AMLODIPINE BESYLATE 2.5 MG/1
2.5 TABLET ORAL DAILY
Status: DISCONTINUED | OUTPATIENT
Start: 2020-11-10 | End: 2020-11-09

## 2020-11-09 RX ORDER — ACETAMINOPHEN 325 MG/1
650 TABLET ORAL EVERY 4 HOURS PRN
Status: DISCONTINUED | OUTPATIENT
Start: 2020-11-09 | End: 2020-11-12 | Stop reason: HOSPADM

## 2020-11-09 RX ORDER — ALLOPURINOL 100 MG/1
100 TABLET ORAL
Status: DISCONTINUED | OUTPATIENT
Start: 2020-11-10 | End: 2020-11-12 | Stop reason: HOSPADM

## 2020-11-09 RX ORDER — CYPROHEPTADINE HYDROCHLORIDE 4 MG/1
4 TABLET ORAL EVERY 8 HOURS
Status: DISCONTINUED | OUTPATIENT
Start: 2020-11-10 | End: 2020-11-12 | Stop reason: HOSPADM

## 2020-11-09 RX ORDER — ESCITALOPRAM OXALATE 10 MG/1
10 TABLET ORAL DAILY
Status: DISCONTINUED | OUTPATIENT
Start: 2020-11-10 | End: 2020-11-12 | Stop reason: HOSPADM

## 2020-11-09 RX ORDER — FUROSEMIDE 10 MG/ML
80 INJECTION INTRAMUSCULAR; INTRAVENOUS
Status: COMPLETED | OUTPATIENT
Start: 2020-11-09 | End: 2020-11-09

## 2020-11-09 RX ORDER — SODIUM CHLORIDE 0.9 % (FLUSH) 0.9 %
10 SYRINGE (ML) INJECTION
Status: DISCONTINUED | OUTPATIENT
Start: 2020-11-09 | End: 2020-11-12 | Stop reason: HOSPADM

## 2020-11-09 RX ORDER — ONDANSETRON 4 MG/1
4 TABLET, ORALLY DISINTEGRATING ORAL EVERY 6 HOURS PRN
Status: DISCONTINUED | OUTPATIENT
Start: 2020-11-09 | End: 2020-11-12 | Stop reason: HOSPADM

## 2020-11-09 RX ORDER — FUROSEMIDE 10 MG/ML
80 INJECTION INTRAMUSCULAR; INTRAVENOUS
Status: DISCONTINUED | OUTPATIENT
Start: 2020-11-10 | End: 2020-11-11

## 2020-11-09 RX ORDER — CLONAZEPAM 0.5 MG/1
1 TABLET ORAL DAILY PRN
Status: DISCONTINUED | OUTPATIENT
Start: 2020-11-10 | End: 2020-11-12 | Stop reason: HOSPADM

## 2020-11-09 RX ADMIN — FUROSEMIDE 80 MG: 10 INJECTION, SOLUTION INTRAVENOUS at 06:11

## 2020-11-09 NOTE — FIRST PROVIDER EVALUATION
" Emergency Department TeleTriage Encounter Note      CHIEF COMPLAINT    Chief Complaint   Patient presents with    Shortness of Breath     pt is a dialysis pt, last completed dialysis this am, pt on home o2 at 2-3 liters at all times, pt reports increased SOB x 2-3 days " states every few mths i need to get fluid drained off my lungs"        VITAL SIGNS   Initial Vitals [11/09/20 1516]   BP Pulse Resp Temp SpO2   127/66 94 20 98.3 °F (36.8 °C) 96 %      MAP       --            ALLERGIES    Review of patient's allergies indicates:   Allergen Reactions    Aspirin      Other reaction(s): hx of ulcers    Tetracycline Swelling     Other reaction(s): Swelling    Penicillins Rash     Other reaction(s): Hives  Other reaction(s): Rash  Other reaction(s): Rash  Other reaction(s): Hives       PROVIDER TRIAGE NOTE  This is a teletriage evaluation of a 75 y.o. female presenting to the ED with c/o shortness of breath x 2 days. Orders placed by provider onsite. Care will be transferred to an alternate provider when patient is roomed for a full evaluation. Any additional orders and the final disposition will be determined by that provider.         ORDERS  Labs Reviewed   CBC W/ AUTO DIFFERENTIAL   COMPREHENSIVE METABOLIC PANEL   TROPONIN I   B-TYPE NATRIURETIC PEPTIDE       ED Orders (720h ago, onward)    Start Ordered     Status Ordering Provider    11/09/20 1527 11/09/20 1526  CBC auto differential  STAT  Collect    Ordered ANDRIY SALDANA JR    11/09/20 1527 11/09/20 1526  Comprehensive metabolic panel  STAT  Collect    Ordered ANDRIY SALDANA JR    11/09/20 1527 11/09/20 1526  Troponin I  STAT  Collect    Ordered ANDRIY SALDANA JR    11/09/20 1527 11/09/20 1526  Brain natriuretic peptide  STAT  Collect    Ordered ANDRIY SALDANA JR    11/09/20 1527 11/09/20 1527  X-Ray Chest AP Portable  1 time imaging      Ordered ANDRIY SALDANA JR    11/09/20 1526 11/09/20 1526  Airborne and Contact and Droplet Isolation " Status  Continuous      Ordered ANDRIY SALDANA JR            Virtual Visit Note: The provider triage portion of this emergency department evaluation and documentation was performed via [employer], a HIPAA-compliant telemedicine application, in concert with a tele-presenter in the room. A face to face patient evaluation with one of my colleagues will occur once the patient is placed in an emergency department room.      DISCLAIMER: This note was prepared with [employer] voice recognition transcription software. Garbled syntax, mangled pronouns, and other bizarre constructions may be attributed to that software system.

## 2020-11-09 NOTE — ED PROVIDER NOTES
"Encounter Date: 11/9/2020    SCRIBE #1 NOTE: I, Jemima Ceballos, am scribing for, and in the presence of,  Dr. Toledo . I have scribed the entire note.       History     Chief Complaint   Patient presents with    Shortness of Breath     pt is a dialysis pt, last completed dialysis this am, pt on home o2 at 2-3 liters at all times, pt reports increased SOB x 2-3 days " states every few mths i need to get fluid drained off my lungs"      Katie Quevedo is a 75 y.o. female who  has a past medical history of Acute congestive heart failure (02/10/2020), Anemia, Bilateral renal cysts, Cataract, Chronic LBP (7/26/2012), Chronic pain, CKD (chronic kidney disease), stage IV, COPD (chronic obstructive pulmonary disease), Dehydration, Encounter for blood transfusion, HTN (hypertension), Lumbar spondylosis, Melanoma, Metabolic bone disease, Migraines, neuralgic, Osteoporosis, Primary osteoarthritis of both knees, Pulmonary embolism with infarction, Seizures (1972), Subdeltoid bursitis, L>R. (9/27/2012), Ulcer, and Vitamin D deficiency disease.    The patient presents to the ED due to increased difficulty breathing x 2 days. Pt reports the onset of symptoms while receiving her dialysis earlier today, noting she did not receive the full treatment. Pt is currently on 2L oxygen at home and reports worsening of sx's with lying flat. She mentions the need to sleep on three pillows at night to improve breathing. She denies any fevers, lightheadedness, CP, abdominal pain or any other concerns at this time. Pt admits to current compliance with Lasix prescription.      The history is provided by the patient and the spouse.     Review of patient's allergies indicates:   Allergen Reactions    Aspirin      Other reaction(s): hx of ulcers    Tetracycline Swelling     Other reaction(s): Swelling    Penicillins Rash     Other reaction(s): Hives  Other reaction(s): Rash  Other reaction(s): Rash  Other reaction(s): Hives     Past Medical " History:   Diagnosis Date    Acute congestive heart failure 02/10/2020    Anemia     Bilateral renal cysts     Cataract     Chronic LBP 7/26/2012    Chronic pain     CKD (chronic kidney disease), stage IV     COPD (chronic obstructive pulmonary disease)     Dehydration     Encounter for blood transfusion     HTN (hypertension)     Lumbar spondylosis     Melanoma     of the lip    Metabolic bone disease     Migraines, neuralgic     Osteoporosis     Primary osteoarthritis of both knees     s/p Rt TKA    Pulmonary embolism with infarction     Seizures 1972    x1 only    Subdeltoid bursitis, L>R. 9/27/2012    Ulcer     Vitamin D deficiency disease      Past Surgical History:   Procedure Laterality Date    BLADDER SUSPENSION      CATARACT EXTRACTION  11/18/13    left eye    CERVICAL LAMINECTOMY      x3, fusion x1    COLONOSCOPY  2009    FISTULOGRAM N/A 2/27/2020    Procedure: Fistulogram;  Surgeon: Noe Benitez MD;  Location: [facility] CATH LAB/EP;  Service: Cardiology;  Laterality: N/A;    HYSTERECTOMY      JOINT REPLACEMENT  2001    total right knee     LUMBAR LAMINECTOMY      x 3, fusion x1    OOPHORECTOMY      PERIPHERAL ANGIOGRAPHY N/A 3/9/2020    Procedure: Peripheral angiography;  Surgeon: Jacinto Henriquez MD;  Location: [facility] CATH LAB/EP;  Service: Cardiology;  Laterality: N/A;    PLACEMENT OF ARTERIOVENOUS GRAFT Left 1/21/2020    Procedure: INSERTION, GRAFT, ARTERIOVENOUS;  Surgeon: Lindsey Louie MD;  Location: [facility] OR;  Service: General;  Laterality: Left;    THROMBECTOMY Left 2/3/2020    Procedure: THROMBECTOMY;  Surgeon: Lindsey Louie MD;  Location: [facility] OR;  Service: General;  Laterality: Left;    THROMBECTOMY  3/9/2020    Procedure: Thrombectomy;  Surgeon: Jacinto Henriquez MD;  Location: [facility] CATH LAB/EP;  Service: Cardiology;;     Family History   Problem Relation Age of Onset    Arthritis Mother     Stroke Mother     Hypertension Father     Cancer Father      Cataracts Father     Diabetes Maternal Aunt     Hypertension Maternal Grandfather     Heart disease Maternal Grandfather     Heart attack Maternal Grandfather     Cataracts Sister     Glaucoma Cousin      Social History     Tobacco Use    Smoking status: Former Smoker     Types: Cigarettes    Smokeless tobacco: Former User     Quit date: 2/3/2015   Substance Use Topics    Alcohol use: Not Currently     Comment: Rare    Drug use: No     Review of Systems   Constitutional: Negative for chills and fever.   HENT: Negative for congestion, rhinorrhea and sore throat.    Eyes: Negative for redness and visual disturbance.   Respiratory: Positive for shortness of breath. Negative for cough and wheezing.    Cardiovascular: Negative for chest pain and palpitations.   Gastrointestinal: Negative for abdominal pain, diarrhea, nausea and vomiting.   Genitourinary: Negative for dysuria and hematuria.   Musculoskeletal: Negative for back pain, myalgias and neck pain.   Skin: Negative for rash.   Neurological: Negative for dizziness, weakness and light-headedness.   Psychiatric/Behavioral: Negative for confusion.       Physical Exam     Initial Vitals [11/09/20 1516]   BP Pulse Resp Temp SpO2   127/66 94 20 98.3 °F (36.8 °C) 96 %      MAP       --         Physical Exam    Nursing note and vitals reviewed.  Constitutional: She appears well-developed and well-nourished. She is not diaphoretic. No distress.   Thin and frail    HENT:   Head: Normocephalic and atraumatic.   Mouth/Throat: Oropharynx is clear and moist.   Eyes: EOM are normal. Pupils are equal, round, and reactive to light.   Neck: No tracheal deviation present.   Cardiovascular: Normal rate, regular rhythm, normal heart sounds and intact distal pulses.   Pulmonary/Chest: No stridor. No respiratory distress. She has no wheezes. She has rales.   Rales noted bilaterally    Decreased lung sounds at left base    Abdominal: Soft. Bowel sounds are normal. She  exhibits no distension and no mass. There is no abdominal tenderness.   Musculoskeletal: Normal range of motion. No edema.      Comments: No peripheral edema    Neurological: She is alert and oriented to person, place, and time. She has normal strength. No cranial nerve deficit or sensory deficit.   Skin: Skin is warm and dry. Capillary refill takes less than 2 seconds. No pallor.   Psychiatric: She has a normal mood and affect. Her behavior is normal. Thought content normal.         ED Course   Procedures  Labs Reviewed   CBC W/ AUTO DIFFERENTIAL - Abnormal; Notable for the following components:       Result Value    RBC 3.53 (*)     Hemoglobin 10.4 (*)     Hematocrit 32.7 (*)     MCHC 31.8 (*)     RDW 15.6 (*)     Lymph % 15.8 (*)     All other components within normal limits   COMPREHENSIVE METABOLIC PANEL - Abnormal; Notable for the following components:    BUN 27 (*)     Total Bilirubin 1.2 (*)     Alkaline Phosphatase 317 (*)     eGFR if  46 (*)     eGFR if non  40 (*)     All other components within normal limits   B-TYPE NATRIURETIC PEPTIDE - Abnormal; Notable for the following components:     (*)     All other components within normal limits   TROPONIN I   SARS-COV-2 RNA AMPLIFICATION, QUAL          Imaging Results          X-Ray Chest AP Portable (Final result)  Result time 11/09/20 17:30:26    Final result by Reymundo Macias MD (11/09/20 17:30:26)                 Impression:      Since July 6, 2020, new small right pleural effusion.      Electronically signed by: Reymundo Macias MD  Date:    11/09/2020  Time:    17:30             Narrative:    EXAMINATION:  XR CHEST AP PORTABLE    CLINICAL HISTORY:  Shortness of breath    TECHNIQUE:  Single frontal view of the chest was performed.    COMPARISON:  Prior exams dating back to 2008    FINDINGS:  Since July 6, 2020, new small right pleural effusion.  Unchanged moderate left pleural effusion, possibly  loculated.    Unchanged left lower lung zone opacities.  Right lung is clear.    No pneumothorax.                                 Medical Decision Making:   History:   Old Medical Records: I decided to obtain old medical records.  Initial Assessment:   74 y/o female with a past medical history of COPD and CKD presents to the ED for increased difficulty breathing while receiving dialysis earlier today. VSSWNL. On exam pt exhibits decreased lung sounds with rales bilaterally.   Differential Diagnosis:   Ddx includes but is not limited to:   ACS, CHF, pneumonia, pneumothorax, asthma , COPD. Considered PE however much lower likelihood at this time considering patient's history and presentation.   Independently Interpreted Test(s):   I have ordered and independently interpreted X-rays - see prior notes.  Clinical Tests:   Lab Tests: Ordered and Reviewed  Radiological Study: Ordered and Reviewed  ED Management:  Plan to obtain basic labs, CXR, bedside ultrasound and reassess.                     ED Course as of Nov 10 1426   Mon Nov 09, 2020   1750 Pt appears tachypneic when lying flat. US noted for pleau    [DC]      ED Course User Index  [DC] Chad Toledo Jr., MD            Clinical Impression:       ICD-10-CM ICD-9-CM   1. SOB (shortness of breath)  R06.02 786.05                                    I, Chad Toledo,  personally performed the services described in this documentation. All medical record entries made by the scribe were at my direction and in my presence.  I have reviewed the chart and agree that the record reflects my personal performance and is accurate and complete. Chad Toledo Jr., MD  11/10/20 0865

## 2020-11-09 NOTE — ED NOTES
Pt presented to Ed today for sob. Pt reports that occasionally she has to have thoracentesis to drain fluid off of her lungs. Pt is currently on HD and completed treatment today.   Fistula to left upper arm, + bruit and thrill.     Pt is alert, age appropriate and in no acute distress. Respirations are even and unlabored. Bilateral breath sounds are coarse throughout chest. abd is soft, not tender and not distended. pt denies change in feeding, bowel or bladder habits. Skin is warm and color is appropriate for ethnicity.  No edema noted to bilateral lower extremities. Pt moves all extremities well. Conjunctivae normal. Pt is dressed appropriately and well groomed.     Patient on cardiac monitor, automatic blood pressure cuff and pulse oximeter.

## 2020-11-10 PROBLEM — R06.01 ORTHOPNEA: Status: ACTIVE | Noted: 2020-11-10

## 2020-11-10 PROBLEM — J96.11 CHRONIC RESPIRATORY FAILURE WITH HYPOXIA: Status: ACTIVE | Noted: 2019-06-19

## 2020-11-10 LAB
ANION GAP SERPL CALC-SCNC: 12 MMOL/L (ref 8–16)
AORTIC ROOT ANNULUS: 2.97 CM
APTT BLDCRRT: 30.4 SEC (ref 21–32)
ASCENDING AORTA: 2.45 CM
AV INDEX (PROSTH): 0.93
AV MEAN GRADIENT: 6 MMHG
AV PEAK GRADIENT: 11 MMHG
AV VALVE AREA: 2.66 CM2
AV VELOCITY RATIO: 0.9
BASOPHILS # BLD AUTO: 0.06 K/UL (ref 0–0.2)
BASOPHILS NFR BLD: 0.9 % (ref 0–1.9)
BILIRUB UR QL STRIP: NEGATIVE
BSA FOR ECHO PROCEDURE: 1.38 M2
BUN SERPL-MCNC: 34 MG/DL (ref 8–23)
CALCIUM SERPL-MCNC: 9.6 MG/DL (ref 8.7–10.5)
CHLORIDE SERPL-SCNC: 101 MMOL/L (ref 95–110)
CLARITY UR: CLEAR
CO2 SERPL-SCNC: 26 MMOL/L (ref 23–29)
COLOR UR: YELLOW
CREAT SERPL-MCNC: 1.7 MG/DL (ref 0.5–1.4)
CV ECHO LV RWT: 0.46 CM
DIFFERENTIAL METHOD: ABNORMAL
DOP CALC AO PEAK VEL: 1.67 M/S
DOP CALC AO VTI: 28.58 CM
DOP CALC LVOT AREA: 2.9 CM2
DOP CALC LVOT DIAMETER: 1.91 CM
DOP CALC LVOT PEAK VEL: 1.5 M/S
DOP CALC LVOT STROKE VOLUME: 76 CM3
DOP CALCLVOT PEAK VEL VTI: 26.54 CM
E WAVE DECELERATION TIME: 177.1 MSEC
E/A RATIO: 1.6
E/E' RATIO: 15.38 M/S
ECHO LV POSTERIOR WALL: 0.81 CM (ref 0.6–1.1)
EOSINOPHIL # BLD AUTO: 0.2 K/UL (ref 0–0.5)
EOSINOPHIL NFR BLD: 3.4 % (ref 0–8)
ERYTHROCYTE [DISTWIDTH] IN BLOOD BY AUTOMATED COUNT: 15.6 % (ref 11.5–14.5)
EST. GFR  (AFRICAN AMERICAN): 33 ML/MIN/1.73 M^2
EST. GFR  (NON AFRICAN AMERICAN): 29 ML/MIN/1.73 M^2
FRACTIONAL SHORTENING: 47 % (ref 28–44)
GLUCOSE SERPL-MCNC: 74 MG/DL (ref 70–110)
GLUCOSE UR QL STRIP: NEGATIVE
HCT VFR BLD AUTO: 32.2 % (ref 37–48.5)
HGB BLD-MCNC: 10 G/DL (ref 12–16)
HGB UR QL STRIP: NEGATIVE
IMM GRANULOCYTES # BLD AUTO: 0.02 K/UL (ref 0–0.04)
IMM GRANULOCYTES NFR BLD AUTO: 0.3 % (ref 0–0.5)
INR PPP: 1.1 (ref 0.8–1.2)
INTERVENTRICULAR SEPTUM: 0.81 CM (ref 0.6–1.1)
KETONES UR QL STRIP: NEGATIVE
LA MAJOR: 4.65 CM
LA MINOR: 3.96 CM
LA WIDTH: 2.87 CM
LEFT ATRIUM SIZE: 3.79 CM
LEFT ATRIUM VOLUME INDEX: 28.3 ML/M2
LEFT ATRIUM VOLUME: 39.55 CM3
LEFT INTERNAL DIMENSION IN SYSTOLE: 1.86 CM (ref 2.1–4)
LEFT VENTRICLE DIASTOLIC VOLUME INDEX: 37.28 ML/M2
LEFT VENTRICLE DIASTOLIC VOLUME: 52.17 ML
LEFT VENTRICLE MASS INDEX: 56 G/M2
LEFT VENTRICLE SYSTOLIC VOLUME INDEX: 7.5 ML/M2
LEFT VENTRICLE SYSTOLIC VOLUME: 10.5 ML
LEFT VENTRICULAR INTERNAL DIMENSION IN DIASTOLE: 3.54 CM (ref 3.5–6)
LEFT VENTRICULAR MASS: 78 G
LEUKOCYTE ESTERASE UR QL STRIP: NEGATIVE
LV LATERAL E/E' RATIO: 17.57 M/S
LV SEPTAL E/E' RATIO: 13.67 M/S
LYMPHOCYTES # BLD AUTO: 1.6 K/UL (ref 1–4.8)
LYMPHOCYTES NFR BLD: 23.8 % (ref 18–48)
MAGNESIUM SERPL-MCNC: 2.1 MG/DL (ref 1.6–2.6)
MCH RBC QN AUTO: 29.4 PG (ref 27–31)
MCHC RBC AUTO-ENTMCNC: 31.1 G/DL (ref 32–36)
MCV RBC AUTO: 95 FL (ref 82–98)
MONOCYTES # BLD AUTO: 0.8 K/UL (ref 0.3–1)
MONOCYTES NFR BLD: 11.8 % (ref 4–15)
MV PEAK A VEL: 0.77 M/S
MV PEAK E VEL: 1.23 M/S
MV STENOSIS PRESSURE HALF TIME: 51.36 MS
MV VALVE AREA P 1/2 METHOD: 4.28 CM2
NEUTROPHILS # BLD AUTO: 3.9 K/UL (ref 1.8–7.7)
NEUTROPHILS NFR BLD: 59.8 % (ref 38–73)
NITRITE UR QL STRIP: NEGATIVE
NRBC BLD-RTO: 0 /100 WBC
PH UR STRIP: 8 [PH] (ref 5–8)
PISA TR MAX VEL: 2.65 M/S
PLATELET # BLD AUTO: 224 K/UL (ref 150–350)
PMV BLD AUTO: 10.4 FL (ref 9.2–12.9)
POTASSIUM SERPL-SCNC: 4.3 MMOL/L (ref 3.5–5.1)
PROT UR QL STRIP: NEGATIVE
PROTHROMBIN TIME: 11.5 SEC (ref 9–12.5)
PULM VEIN S/D RATIO: 1.02
PV PEAK D VEL: 0.62 M/S
PV PEAK S VEL: 0.63 M/S
PV PEAK VELOCITY: 1.21 CM/S
RA MAJOR: 4 CM
RA PRESSURE: 3 MMHG
RA WIDTH: 2.79 CM
RBC # BLD AUTO: 3.4 M/UL (ref 4–5.4)
RIGHT VENTRICULAR END-DIASTOLIC DIMENSION: 2.47 CM
SODIUM SERPL-SCNC: 139 MMOL/L (ref 136–145)
SP GR UR STRIP: 1 (ref 1–1.03)
STJ: 2.13 CM
TDI LATERAL: 0.07 M/S
TDI SEPTAL: 0.09 M/S
TDI: 0.08 M/S
TR MAX PG: 28 MMHG
TRICUSPID ANNULAR PLANE SYSTOLIC EXCURSION: 1.63 CM
TV REST PULMONARY ARTERY PRESSURE: 31 MMHG
URN SPEC COLLECT METH UR: NORMAL
UROBILINOGEN UR STRIP-ACNC: NEGATIVE EU/DL
WBC # BLD AUTO: 6.5 K/UL (ref 3.9–12.7)

## 2020-11-10 PROCEDURE — 25000242 PHARM REV CODE 250 ALT 637 W/ HCPCS: Mod: HCNC | Performed by: NURSE PRACTITIONER

## 2020-11-10 PROCEDURE — A4216 STERILE WATER/SALINE, 10 ML: HCPCS | Mod: HCNC | Performed by: NURSE PRACTITIONER

## 2020-11-10 PROCEDURE — 94761 N-INVAS EAR/PLS OXIMETRY MLT: CPT | Mod: HCNC

## 2020-11-10 PROCEDURE — 63600175 PHARM REV CODE 636 W HCPCS: Mod: HCNC | Performed by: NURSE PRACTITIONER

## 2020-11-10 PROCEDURE — 85025 COMPLETE CBC W/AUTO DIFF WBC: CPT | Mod: HCNC

## 2020-11-10 PROCEDURE — 94640 AIRWAY INHALATION TREATMENT: CPT | Mod: HCNC

## 2020-11-10 PROCEDURE — 85730 THROMBOPLASTIN TIME PARTIAL: CPT | Mod: HCNC

## 2020-11-10 PROCEDURE — 99226 PR SUBSEQUENT OBSERVATION CARE,LEVEL III: CPT | Mod: HCNC,,, | Performed by: PHYSICIAN ASSISTANT

## 2020-11-10 PROCEDURE — 25000003 PHARM REV CODE 250: Mod: HCNC | Performed by: NURSE PRACTITIONER

## 2020-11-10 PROCEDURE — 25000003 PHARM REV CODE 250: Mod: HCNC | Performed by: INTERNAL MEDICINE

## 2020-11-10 PROCEDURE — 80048 BASIC METABOLIC PNL TOTAL CA: CPT | Mod: HCNC

## 2020-11-10 PROCEDURE — 63600175 PHARM REV CODE 636 W HCPCS: Mod: HCNC | Performed by: FAMILY MEDICINE

## 2020-11-10 PROCEDURE — 81003 URINALYSIS AUTO W/O SCOPE: CPT | Mod: HCNC

## 2020-11-10 PROCEDURE — 85610 PROTHROMBIN TIME: CPT | Mod: HCNC

## 2020-11-10 PROCEDURE — 99214 PR OFFICE/OUTPT VISIT, EST, LEVL IV, 30-39 MIN: ICD-10-PCS | Mod: HCNC,GC,, | Performed by: INTERNAL MEDICINE

## 2020-11-10 PROCEDURE — 99226 PR SUBSEQUENT OBSERVATION CARE,LEVEL III: ICD-10-PCS | Mod: HCNC,,, | Performed by: PHYSICIAN ASSISTANT

## 2020-11-10 PROCEDURE — 83735 ASSAY OF MAGNESIUM: CPT | Mod: HCNC

## 2020-11-10 PROCEDURE — 36415 COLL VENOUS BLD VENIPUNCTURE: CPT | Mod: HCNC

## 2020-11-10 PROCEDURE — G0378 HOSPITAL OBSERVATION PER HR: HCPCS | Mod: HCNC

## 2020-11-10 PROCEDURE — 96372 THER/PROPH/DIAG INJ SC/IM: CPT

## 2020-11-10 PROCEDURE — 27000221 HC OXYGEN, UP TO 24 HOURS: Mod: HCNC

## 2020-11-10 PROCEDURE — 99900035 HC TECH TIME PER 15 MIN (STAT): Mod: HCNC

## 2020-11-10 PROCEDURE — 99214 OFFICE O/P EST MOD 30 MIN: CPT | Mod: HCNC,GC,, | Performed by: INTERNAL MEDICINE

## 2020-11-10 PROCEDURE — 96376 TX/PRO/DX INJ SAME DRUG ADON: CPT | Mod: 59

## 2020-11-10 RX ORDER — MUPIROCIN 20 MG/G
OINTMENT TOPICAL 2 TIMES DAILY
Status: DISCONTINUED | OUTPATIENT
Start: 2020-11-10 | End: 2020-11-12 | Stop reason: HOSPADM

## 2020-11-10 RX ORDER — SODIUM CHLORIDE 9 MG/ML
INJECTION, SOLUTION INTRAVENOUS ONCE
Status: COMPLETED | OUTPATIENT
Start: 2020-11-10 | End: 2020-11-11

## 2020-11-10 RX ORDER — SODIUM CHLORIDE 9 MG/ML
INJECTION, SOLUTION INTRAVENOUS
Status: DISCONTINUED | OUTPATIENT
Start: 2020-11-10 | End: 2020-11-12 | Stop reason: HOSPADM

## 2020-11-10 RX ORDER — ENOXAPARIN SODIUM 100 MG/ML
30 INJECTION SUBCUTANEOUS EVERY 24 HOURS
Status: DISCONTINUED | OUTPATIENT
Start: 2020-11-10 | End: 2020-11-12 | Stop reason: HOSPADM

## 2020-11-10 RX ORDER — IPRATROPIUM BROMIDE AND ALBUTEROL SULFATE 2.5; .5 MG/3ML; MG/3ML
3 SOLUTION RESPIRATORY (INHALATION) EVERY 4 HOURS PRN
Status: DISCONTINUED | OUTPATIENT
Start: 2020-11-10 | End: 2020-11-12 | Stop reason: HOSPADM

## 2020-11-10 RX ADMIN — CYPROHEPTADINE HYDROCHLORIDE 4 MG: 4 TABLET ORAL at 07:11

## 2020-11-10 RX ADMIN — CYPROHEPTADINE HYDROCHLORIDE 4 MG: 4 TABLET ORAL at 09:11

## 2020-11-10 RX ADMIN — ESCITALOPRAM OXALATE 10 MG: 10 TABLET ORAL at 08:11

## 2020-11-10 RX ADMIN — Medication 10 ML: at 03:11

## 2020-11-10 RX ADMIN — FUROSEMIDE 80 MG: 10 INJECTION, SOLUTION INTRAVENOUS at 07:11

## 2020-11-10 RX ADMIN — ENOXAPARIN SODIUM 30 MG: 100 INJECTION SUBCUTANEOUS at 04:11

## 2020-11-10 RX ADMIN — ALLOPURINOL 100 MG: 100 TABLET ORAL at 04:11

## 2020-11-10 RX ADMIN — MUPIROCIN: 20 OINTMENT TOPICAL at 09:11

## 2020-11-10 RX ADMIN — FUROSEMIDE 80 MG: 10 INJECTION, SOLUTION INTRAVENOUS at 05:11

## 2020-11-10 RX ADMIN — CYPROHEPTADINE HYDROCHLORIDE 4 MG: 4 TABLET ORAL at 01:11

## 2020-11-10 RX ADMIN — IPRATROPIUM BROMIDE AND ALBUTEROL SULFATE 3 ML: .5; 3 SOLUTION RESPIRATORY (INHALATION) at 07:11

## 2020-11-10 RX ADMIN — ACETAMINOPHEN 650 MG: 325 TABLET ORAL at 05:11

## 2020-11-10 NOTE — ASSESSMENT & PLAN NOTE
- on HD M-W-F; full treatment yesterday; appears at baseline dry weight per patient  - Nephrology for ongoing routine HD

## 2020-11-10 NOTE — ASSESSMENT & PLAN NOTE
Left side is chronic  New to the right side  Consulted IR: Consolidated LLL with a trace loculated left pleural effusion. Volume too small to warrant thoracentesis.   Nephrology recommended pulmonology consult.  Pulm will eval on 11/11 but recommended CT Chest, discussed case with pulm and workup in the past has been transudative.

## 2020-11-10 NOTE — ASSESSMENT & PLAN NOTE
- moderate left pleural effusion and small right pleural effusion  - previous thoracentesis in June with only 30cc removed; mention per radiology about possibility of loculation  - defer further intervention to primary team and interventional radiology

## 2020-11-10 NOTE — CONSULTS
Nephrology Consult  H&P      Consult Requested By: Rosa Del Toro MD  Reason for Consult: ESRD    SUBJECTIVE:     History of Present Illness:  Patient is a 75 y.o. female presents with SOB. ESRD on HD with recurrent pleural effusion      Review of Systems   Constitutional: Negative for chills and fever.   Eyes: Negative for blurred vision and double vision.   Respiratory: Positive for wheezing. Negative for cough and shortness of breath.    Cardiovascular: Negative for chest pain and leg swelling.   Gastrointestinal: Negative for blood in stool, diarrhea, nausea and vomiting.   Genitourinary: Negative for dysuria, frequency and urgency.   Musculoskeletal: Negative for myalgias and neck pain.   Skin: Negative for itching and rash.   Neurological: Negative for dizziness.   Endo/Heme/Allergies: Does not bruise/bleed easily.   Psychiatric/Behavioral: Negative for depression.       Past Medical History:   Diagnosis Date    Acute congestive heart failure 02/10/2020    Anemia     Bilateral renal cysts     Cataract     Chronic LBP 7/26/2012    Chronic pain     CKD (chronic kidney disease), stage IV     COPD (chronic obstructive pulmonary disease)     Dehydration     Encounter for blood transfusion     HTN (hypertension)     Lumbar spondylosis     Melanoma     of the lip    Metabolic bone disease     Migraines, neuralgic     Osteoporosis     Primary osteoarthritis of both knees     s/p Rt TKA    Pulmonary embolism with infarction     Seizures 1972    x1 only    Subdeltoid bursitis, L>R. 9/27/2012    Ulcer     Vitamin D deficiency disease      Past Surgical History:   Procedure Laterality Date    BLADDER SUSPENSION      CATARACT EXTRACTION  11/18/13    left eye    CERVICAL LAMINECTOMY      x3, fusion x1    COLONOSCOPY  2009    FISTULOGRAM N/A 2/27/2020    Procedure: Fistulogram;  Surgeon: Noe Benitez MD;  Location: [facility] CATH LAB/EP;  Service: Cardiology;  Laterality: N/A;    HYSTERECTOMY       JOINT REPLACEMENT  2001    total right knee     LUMBAR LAMINECTOMY      x 3, fusion x1    OOPHORECTOMY      PERIPHERAL ANGIOGRAPHY N/A 3/9/2020    Procedure: Peripheral angiography;  Surgeon: Jacinto Henriquez MD;  Location: [facility] CATH LAB/EP;  Service: Cardiology;  Laterality: N/A;    PLACEMENT OF ARTERIOVENOUS GRAFT Left 1/21/2020    Procedure: INSERTION, GRAFT, ARTERIOVENOUS;  Surgeon: Lindsey Louie MD;  Location: [facility] OR;  Service: General;  Laterality: Left;    THROMBECTOMY Left 2/3/2020    Procedure: THROMBECTOMY;  Surgeon: Lindsey Louie MD;  Location: [facility] OR;  Service: General;  Laterality: Left;    THROMBECTOMY  3/9/2020    Procedure: Thrombectomy;  Surgeon: Jacinto Henriquez MD;  Location: [facility] CATH LAB/EP;  Service: Cardiology;;     Family History   Problem Relation Age of Onset    Arthritis Mother     Stroke Mother     Hypertension Father     Cancer Father     Cataracts Father     Diabetes Maternal Aunt     Hypertension Maternal Grandfather     Heart disease Maternal Grandfather     Heart attack Maternal Grandfather     Cataracts Sister     Glaucoma Cousin      Social History     Tobacco Use    Smoking status: Former Smoker     Types: Cigarettes    Smokeless tobacco: Former User     Quit date: 2/3/2015   Substance Use Topics    Alcohol use: Not Currently     Comment: Rare    Drug use: No       Review of patient's allergies indicates:   Allergen Reactions    Aspirin      Other reaction(s): hx of ulcers    Tetracycline Swelling     Other reaction(s): Swelling    Penicillins Rash     Other reaction(s): Hives  Other reaction(s): Rash  Other reaction(s): Rash  Other reaction(s): Hives            OBJECTIVE:     Vital Signs (Most Recent)  Vitals:    11/10/20 0752 11/10/20 0941 11/10/20 1155 11/10/20 1158   BP:    (!) 97/46   BP Location:    Right arm   Patient Position:    Sitting   Pulse: 106  94 92   Resp: 20   16   Temp:    97.5 °F (36.4 °C)   TempSrc:    Oral   SpO2: 98%  "     Weight:  43.5 kg (96 lb)     Height:  5' 2" (1.575 m)                   Medications:   allopurinoL  100 mg Oral Every Tues, Thurs, Sat, Sun    cyproheptadine  4 mg Oral Q8H    enoxaparin  30 mg Subcutaneous Q24H    escitalopram oxalate  10 mg Oral Daily    furosemide (LASIX) IV  80 mg Intravenous Q12H           Physical Exam   Constitutional: She is oriented to person, place, and time and well-developed, well-nourished, and in no distress. No distress.   HENT:   Head: Normocephalic and atraumatic.   Mouth/Throat: Oropharynx is clear and moist.   Eyes: EOM are normal. No scleral icterus.   Neck: Neck supple. No JVD present.   Cardiovascular: Normal rate and regular rhythm. Exam reveals no friction rub.   No murmur heard.  Pulmonary/Chest: Effort normal and breath sounds normal. No respiratory distress. She has no wheezes. She has no rales.   Abdominal: Soft. Bowel sounds are normal. She exhibits no distension. There is no abdominal tenderness.   Musculoskeletal:         General: No edema.   Neurological: She is alert and oriented to person, place, and time.   Skin: Skin is warm and dry. No rash noted. She is not diaphoretic. No erythema.   Psychiatric: Affect normal.       Laboratory:  Recent Labs   Lab 11/09/20  1631 11/10/20  0502   WBC 9.20 6.50   HGB 10.4* 10.0*   HCT 32.7* 32.2*    224   MONO 10.4  1.0 11.8  0.8     Recent Labs   Lab 11/09/20  1631 11/10/20  0502    139   K 4.3 4.3    101   CO2 24 26   BUN 27* 34*   CREATININE 1.3 1.7*   CALCIUM 9.3 9.6       Diagnostic Results:  X-Ray: Reviewed  US: Reviewed  Echo: Reviewed  ASSESSMENT/PLAN:     1. ESRD (N18.6 Z99.2) - usual HD on MWF with Dr. Aura Diggs   Completed 2 H HD yesterday but SOB became progressively unbearable and she went to ED    Awaiting thoracentesis - please consult pulm. Does pt need pleurodesis? Pleural cath? This is 3rd admission for this pleural effusion this year (last one was in Feb 2020)  Plan for HD " tomorrow AM    2. HTN -\UF as tolerated on dialysis  3. MBD - at goal with HEcterol IV with Hd, not on binders  Lab Results   Component Value Date    .0 (H) 12/28/2018    CALCIUM 9.6 11/10/2020    PHOS 4.1 02/28/2020     Recent Labs   Lab 11/10/20  0502   MG 2.1     Lab Results   Component Value Date    [id number] 26 (L) 02/12/2020     Lab Results   Component Value Date    CO2 26 11/10/2020     4. ANemia of CKD -   At goal, no epo today  Recent Labs   Lab 11/09/20  1631 11/10/20  0502   WBC 9.20 6.50   HGB 10.4* 10.0*   HCT 32.7* 32.2*    224     Lab Results   Component Value Date    IRON 13 (L) 02/12/2020    TIBC 462 (H) 02/12/2020    FERRITIN 148 07/17/2019             5. Access -AVF follows with Dr. Louie  6. Nutrition - renal diet, nepro, renal vitamins        Thank you for consult, will follow  With any question please call [phone]  Yadi Mitchell    [facility]  EDGARD Bustillos MD, NEIL ASMS MD,   MD JAC Benoit, NP  [address]  [place], [address] [phone]

## 2020-11-10 NOTE — ASSESSMENT & PLAN NOTE
SOB (shortness of breath)  Orthopnea    Bilateral rales  BNP elevated  Consult cardiology  Echo ordered  Cont supplemental O2  IV lasix 80 BID

## 2020-11-10 NOTE — ASSESSMENT & PLAN NOTE
Dialysis MWF  Went today but was not able to complete due to SOB  Electrolytes WNL  Consulted nephrology, HD 11/11  Still makes urine

## 2020-11-10 NOTE — CONSULTS
Interventional Radiology Consult/Pre-Procedure Note      Chief Complaint/Reason for Consult: Left pleural effusion    History of Present Illness:  aKtie Quevedo is a 75 y.o. female with the PMH listed below who presents with a left pleural effusion on X-ray. Has undergone thoracentesis in the past. Tolerated well. IR consulted for repeat thoracentesis.    Admission H&P reviewed.    Past Medical History:   Diagnosis Date    Acute congestive heart failure 02/10/2020    Anemia     Bilateral renal cysts     Cataract     Chronic LBP 7/26/2012    Chronic pain     CKD (chronic kidney disease), stage IV     COPD (chronic obstructive pulmonary disease)     Dehydration     Encounter for blood transfusion     HTN (hypertension)     Lumbar spondylosis     Melanoma     of the lip    Metabolic bone disease     Migraines, neuralgic     Osteoporosis     Primary osteoarthritis of both knees     s/p Rt TKA    Pulmonary embolism with infarction     Seizures 1972    x1 only    Subdeltoid bursitis, L>R. 9/27/2012    Ulcer     Vitamin D deficiency disease      Past Surgical History:   Procedure Laterality Date    BLADDER SUSPENSION      CATARACT EXTRACTION  11/18/13    left eye    CERVICAL LAMINECTOMY      x3, fusion x1    COLONOSCOPY  2009    FISTULOGRAM N/A 2/27/2020    Procedure: Fistulogram;  Surgeon: Noe Benitez MD;  Location: [facility] CATH LAB/EP;  Service: Cardiology;  Laterality: N/A;    HYSTERECTOMY      JOINT REPLACEMENT  2001    total right knee     LUMBAR LAMINECTOMY      x 3, fusion x1    OOPHORECTOMY      PERIPHERAL ANGIOGRAPHY N/A 3/9/2020    Procedure: Peripheral angiography;  Surgeon: Jacinto Henriquez MD;  Location: [facility] CATH LAB/EP;  Service: Cardiology;  Laterality: N/A;    PLACEMENT OF ARTERIOVENOUS GRAFT Left 1/21/2020    Procedure: INSERTION, GRAFT, ARTERIOVENOUS;  Surgeon: Lindsey Louie MD;  Location: [facility] OR;  Service: General;  Laterality: Left;    THROMBECTOMY Left  2/3/2020    Procedure: THROMBECTOMY;  Surgeon: Lindsey Louie MD;  Location: [facility] OR;  Service: General;  Laterality: Left;    THROMBECTOMY  3/9/2020    Procedure: Thrombectomy;  Surgeon: Jacinto Henriquez MD;  Location: [facility] CATH LAB/EP;  Service: Cardiology;;       Allergies:   Review of patient's allergies indicates:   Allergen Reactions    Aspirin      Other reaction(s): hx of ulcers    Tetracycline Swelling     Other reaction(s): Swelling    Penicillins Rash     Other reaction(s): Hives  Other reaction(s): Rash  Other reaction(s): Rash  Other reaction(s): Hives       Scheduled Meds:    sodium chloride 0.9%   Intravenous Once    allopurinoL  100 mg Oral Every Tues, Thurs, Sat, Sun    cyproheptadine  4 mg Oral Q8H    enoxaparin  30 mg Subcutaneous Q24H    escitalopram oxalate  10 mg Oral Daily    furosemide (LASIX) IV  80 mg Intravenous Q12H    mupirocin   Nasal BID     Continuous Infusions:   PRN Meds:sodium chloride 0.9%, acetaminophen, albuterol-ipratropium, clonazePAM, ondansetron, ondansetron, sodium chloride 0.9%    Anticoagulation/Antiplatelet Meds: Lovenox    Review of Systems:   As documented in admission H&P.    Physical Exam:  Temp: 97.5 °F (36.4 °C) (11/10/20 1158)  Pulse: 95 (11/10/20 1445)  Resp: 17 (11/10/20 1445)  BP: (!) 101/59 (11/10/20 1445)  SpO2: 100 % (11/10/20 1445)     General: Frail, NAD  HEENT: Normocephalic, sclera anicteric, oropharynx clear  Heart: RRR  Lungs: Symmetric excursions, breathing unlabored  Abd: NTND, soft  Extremities: SILVEIRA  Neuro: Gross nonfocal    Labs:  Recent Labs   Lab 11/10/20  0502   INR 1.1       Recent Labs   Lab 11/10/20  0502   WBC 6.50   HGB 10.0*   HCT 32.2*   MCV 95         Recent Labs   Lab 11/09/20  1631 11/10/20  0502   GLU 80 74    139   K 4.3 4.3    101   CO2 24 26   BUN 27* 34*   CREATININE 1.3 1.7*   CALCIUM 9.3 9.6   MG  --  2.1   ALT 14  --    AST 26  --    ALBUMIN 3.9  --    BILITOT 1.2*  --        Imaging:  CXR  11/9/2020 reviewed.    Assessment/Plan:   Small to moderate left pleural effusion. Will undergo US-guided thoracentesis today.    Sedation: None    Risks (including, but not limited to, pain, bleeding, infection, damage to nearby structures, failure, and the need for additional procedures), benefits, and alternatives were discussed with the patient. All questions were answered to the best of my abilities. The patient wishes to proceed. Written informed consent was obtained.      Adria Ruiz MD  [facility]  Pager [phone]

## 2020-11-10 NOTE — PLAN OF CARE
Pt is AAOx4. Pt has no c/o discomfort or SOB at this time. Pt denies having SOB or dizziness when ambulating to the bathroom. Pt does not appear to have an skin breakdown or open wounds. Pt safety and comfort maintained. Pt appears to be sleeping between rounds. Will continue to monitor.

## 2020-11-10 NOTE — SUBJECTIVE & OBJECTIVE
Interval History:  no acute events overnight, no new complaints.  Pt reports SOB is the same, not better or worse.  Pt on 1L NC.    Review of Systems   Constitutional: Positive for activity change. Negative for chills and fever.   HENT: Negative for congestion, sore throat and trouble swallowing.    Eyes: Negative for pain and visual disturbance.   Respiratory: Positive for shortness of breath. Negative for cough.    Cardiovascular: Negative for chest pain and leg swelling.   Gastrointestinal: Negative for abdominal pain, diarrhea, nausea and vomiting.   Genitourinary: Negative for difficulty urinating and dysuria.   Musculoskeletal: Positive for arthralgias.   Skin: Negative for rash and wound.   Neurological: Positive for weakness. Negative for dizziness, syncope and headaches.   Psychiatric/Behavioral: Negative for confusion.     Objective:     Vital Signs (Most Recent):  Temp: 97.2 °F (36.2 °C) (11/10/20 1630)  Pulse: 86 (11/10/20 1630)  Resp: 16 (11/10/20 1630)  BP: (!) 96/51 (11/10/20 1630)  SpO2: 97 % (11/10/20 1523) Vital Signs (24h Range):  Temp:  [97.2 °F (36.2 °C)-98.6 °F (37 °C)] 97.2 °F (36.2 °C)  Pulse:  [] 86  Resp:  [16-29] 16  SpO2:  [95 %-100 %] 97 %  BP: ()/(46-61) 96/51     Weight: 43.5 kg (96 lb)  Body mass index is 17.56 kg/m².    Intake/Output Summary (Last 24 hours) at 11/10/2020 1722  Last data filed at 11/10/2020 0511  Gross per 24 hour   Intake --   Output 1110 ml   Net -1110 ml      Physical Exam  Vitals signs and nursing note reviewed.   Constitutional:       Appearance: She is cachectic.   HENT:      Head: Normocephalic and atraumatic.      Mouth/Throat:      Mouth: Mucous membranes are moist.   Eyes:      Conjunctiva/sclera: Conjunctivae normal.   Neck:      Musculoskeletal: Neck supple.   Cardiovascular:      Rate and Rhythm: Normal rate and regular rhythm.      Pulses: Normal pulses.   Pulmonary:      Effort: Respiratory distress present.      Breath sounds: Decreased  breath sounds and rales present.   Abdominal:      General: Bowel sounds are normal.      Palpations: Abdomen is soft.   Musculoskeletal: Normal range of motion.         General: No swelling.   Skin:     General: Skin is warm and dry.      Coloration: Skin is pale.   Neurological:      Mental Status: She is alert and oriented to person, place, and time.      Motor: Weakness present.   Psychiatric:         Mood and Affect: Mood normal.         Behavior: Behavior normal.         Thought Content: Thought content normal.         Significant Labs: All pertinent labs within the past 24 hours have been reviewed.    Significant Imaging: I have reviewed all pertinent imaging results/findings within the past 24 hours.

## 2020-11-10 NOTE — PROCEDURES
Consolidated LLL with a trace loculated left pleural effusion. Volume too small to warrant thoracentesis. No thoracentesis was performed.      [facility]  Pager [phone]

## 2020-11-10 NOTE — ASSESSMENT & PLAN NOTE
Dialysis MWF  Went today but was not able to complete due to SOB  Electrolytes WNL  Consult nephrology  Still makes urine

## 2020-11-10 NOTE — NURSING
MD used ultrasound to evaluate fluid in left lung and it was insufficient amount to drain. Procedure cancelled. Pt back to pre post to wait transport back to floor. Pt stable, no distress. Report to floor nurse

## 2020-11-10 NOTE — CONSULTS
[facility]  Cardiology  Consult Note    Patient Name: Katie Quevedo  MRN: [id number]  Admission Date: 11/9/2020  Hospital Length of Stay: 0 days  Code Status: Full Code   Attending Provider: Rosa Del Toro MD   Consulting Provider: HARPREET Grimaldo ANP  Primary Care Physician: Kingston Verduzco MD  Principal Problem:Acute on chronic heart failure    Patient information was obtained from patient, past medical records and ER records.     Inpatient consult to [facility]  Consult performed by: HARPREET Lagunas, ANP  Consult ordered by: Rachel Alexander NP  Reason for consult: CHF exacerbation         Subjective:     Chief Complaint:  SOB      HPI:   76yo female with HTN, chronic respiratory failure, COPD on home O2, ESRD on HD M-W-F, acute decompensated heart failure, pleural effusions and small pericardial effusion who presented to the ER with complaints of SOB. Ms. Quevedo complains of SOB for the past 2 days that progressively worsened. She was at dialysis yesterday and reports her SOB worsened therefore her treatment was cut short and she presented to the ER. She complains of SOB with minimal exertion ie walking from bedroom to bathroom but denies any chest pain or syncope. She endorses orthopnea and sleeps on 3 pillows and reports this has been present for about 2 years but has progressively worsened. She also endorses PND and occasional RLE edema. She reports being on HD since January 2020 and reports initially difficulty with completing her full runs. However lately her  has been going with her and she is able to do her full treatments and return to her dry weight. She does report approximately 30lb weight loss since January with weight down to 99lbs from 124-127lbs. She has not seen her PCP lately and is not up to date on her health screenings     Hospital Course:    11/9/2020 Presented to the ER with complaints of SOB for past 2 days with progressive worsening. Admitted  to [facility] for further treatment and evaluation   11/10/2020  Troponin .010. CBC and BMP WNL. Echocardiogram pending. CXR with moderate left pleural effusion unchanged and small right pleural effusion. On IV Lasix BID with 1.1 liters out overnight. Cardiology consulted for CHF exacerbation   Past Medical History:   Diagnosis Date    Acute congestive heart failure 02/10/2020    Anemia     Bilateral renal cysts     Cataract     Chronic LBP 7/26/2012    Chronic pain     CKD (chronic kidney disease), stage IV     COPD (chronic obstructive pulmonary disease)     Dehydration     Encounter for blood transfusion     HTN (hypertension)     Lumbar spondylosis     Melanoma     of the lip    Metabolic bone disease     Migraines, neuralgic     Osteoporosis     Primary osteoarthritis of both knees     s/p Rt TKA    Pulmonary embolism with infarction     Seizures 1972    x1 only    Subdeltoid bursitis, L>R. 9/27/2012    Ulcer     Vitamin D deficiency disease        Past Surgical History:   Procedure Laterality Date    BLADDER SUSPENSION      CATARACT EXTRACTION  11/18/13    left eye    CERVICAL LAMINECTOMY      x3, fusion x1    COLONOSCOPY  2009    FISTULOGRAM N/A 2/27/2020    Procedure: Fistulogram;  Surgeon: Noe Benitez MD;  Location: [facility] CATH LAB/EP;  Service: Cardiology;  Laterality: N/A;    HYSTERECTOMY      JOINT REPLACEMENT  2001    total right knee     LUMBAR LAMINECTOMY      x 3, fusion x1    OOPHORECTOMY      PERIPHERAL ANGIOGRAPHY N/A 3/9/2020    Procedure: Peripheral angiography;  Surgeon: Jacinto Henriquez MD;  Location: [facility] CATH LAB/EP;  Service: Cardiology;  Laterality: N/A;    PLACEMENT OF ARTERIOVENOUS GRAFT Left 1/21/2020    Procedure: INSERTION, GRAFT, ARTERIOVENOUS;  Surgeon: Lindsey Louie MD;  Location: [facility] OR;  Service: General;  Laterality: Left;    THROMBECTOMY Left 2/3/2020    Procedure: THROMBECTOMY;  Surgeon: Lindsey Louie MD;   Location: [facility] OR;  Service: General;  Laterality: Left;    THROMBECTOMY  3/9/2020    Procedure: Thrombectomy;  Surgeon: Jacinto Henriquez MD;  Location: [facility] CATH LAB/EP;  Service: Cardiology;;       Review of patient's allergies indicates:   Allergen Reactions    Aspirin      Other reaction(s): hx of ulcers    Tetracycline Swelling     Other reaction(s): Swelling    Penicillins Rash     Other reaction(s): Hives  Other reaction(s): Rash  Other reaction(s): Rash  Other reaction(s): Hives       No current facility-administered medications on file prior to encounter.      Current Outpatient Medications on File Prior to Encounter   Medication Sig    albuterol-ipratropium (DUO-NEB) 2.5 mg-0.5 mg/3 mL nebulizer solution Take 3 mLs by nebulization every 6 (six) hours as needed for Shortness of Breath. Rescue    allopurinoL (ZYLOPRIM) 100 MG tablet Take 1 tablet (100 mg total) by mouth on Tuesday, Thursday, Saturday, and Sunday.    amLODIPine (NORVASC) 2.5 MG tablet Take 2.5 mg by mouth once daily.     clonazePAM (KLONOPIN) 1 MG tablet TAKE 1 TABLET (1 MG TOTAL) BY MOUTH DAILY AS NEEDED (ON DIALYSIS DAYS FOR ANXIETY.).    cyproheptadine (PERIACTIN) 4 mg tablet Take 1 tablet (4 mg total) by mouth every 8 (eight) hours.    diphenhydrAMINE (BENADRYL) 25 mg capsule Take 25 mg by mouth every 6 (six) hours as needed for Itching.    epoetin hemant-epbx (RETACRIT) 10,000 unit/mL imjection Inject 0.5 mLs (5,000 Units total) into the skin every Mon, Wed, Fri.    escitalopram oxalate (LEXAPRO) 10 MG tablet Take 1 tablet (10 mg total) by mouth once daily.    fluticasone-umeclidin-vilanter (TRELEGY ELLIPTA) 100-62.5-25 mcg DsDv Inhale 1 puff by mouth into the lungs once daily.    furosemide (LASIX) 80 MG tablet Take 1 tablet (80 mg total) by mouth twice daily on non dialysis days Tuesday, Thursday, Saturday, and Sunday.    ergocalciferol (ERGOCALCIFEROL) 50,000 unit Cap Take 1 capsule by mouth once weekly for 10 weeks,  then take 1 capsule by mouth once monthly.    furosemide (LASIX) 80 MG tablet Take 1 tablet (80 mg total) by mouth 2 (two) times daily.    HYDROcodone-acetaminophen (NORCO)  mg per tablet Take 1 tablet by mouth 3 (three) times daily as needed.    lidocaine-prilocaine (EMLA) cream Apply topically to left arm prior to dialysis.    megestroL (MEGACE) 40 MG Tab take  1 tablet by mouth every day    ondansetron (ZOFRAN-ODT) 4 MG TbDL Dissolve one tablet under the tongue every 6 (six) hours as needed.    potassium chloride SA (K-DUR,KLOR-CON) 20 MEQ tablet Take 2 tablets (40 mEq total) by mouth once daily.    sodium bicarbonate 650 MG tablet Take 1 tablet (650 mg total) by mouth 2 (two) times daily.    traZODone (DESYREL) 100 MG tablet Take 100 mg by mouth nightly as needed for Insomnia.     Family History     Problem Relation (Age of Onset)    Arthritis Mother    Cancer Father    Cataracts Father, Sister    Diabetes Maternal Aunt    Glaucoma Cousin    Heart attack Maternal Grandfather    Heart disease Maternal Grandfather    Hypertension Father, Maternal Grandfather    Stroke Mother        Tobacco Use    Smoking status: Former Smoker     Types: Cigarettes    Smokeless tobacco: Former User     Quit date: 2/3/2015   Substance and Sexual Activity    Alcohol use: Not Currently     Comment: Rare    Drug use: No    Sexual activity: Never     Partners: Male     Review of Systems   Constitution: Negative for chills, decreased appetite, diaphoresis and fever.   Cardiovascular: Positive for dyspnea on exertion, orthopnea and paroxysmal nocturnal dyspnea. Negative for chest pain, claudication, cyanosis, irregular heartbeat, leg swelling, near-syncope, palpitations and syncope.   Respiratory: Negative for cough, hemoptysis, shortness of breath and wheezing.    Gastrointestinal: Negative for bloating, abdominal pain, constipation, diarrhea, melena, nausea and vomiting.   Neurological: Negative for dizziness and  weakness.     Objective:     Vital Signs (Most Recent):  Temp: 97.5 °F (36.4 °C) (11/10/20 1158)  Pulse: 92 (11/10/20 1158)  Resp: 16 (11/10/20 1158)  BP: (!) 97/46 (11/10/20 1158)  SpO2: 98 % (11/10/20 0752) Vital Signs (24h Range):  Temp:  [97.5 °F (36.4 °C)-98.6 °F (37 °C)] 97.5 °F (36.4 °C)  Pulse:  [] 92  Resp:  [16-29] 16  SpO2:  [95 %-100 %] 98 %  BP: ()/(46-66) 97/46     Weight: 43.5 kg (96 lb)  Body mass index is 17.56 kg/m².    SpO2: 98 %  O2 Device (Oxygen Therapy): nasal cannula      Intake/Output Summary (Last 24 hours) at 11/10/2020 1227  Last data filed at 11/10/2020 0511  Gross per 24 hour   Intake --   Output 1110 ml   Net -1110 ml       Lines/Drains/Airways     Drain                 Hemodialysis AV Graft Left upper arm -- days          Peripheral Intravenous Line                 Peripheral IV - Single Lumen 11/09/20 1630 20 G Right Forearm less than 1 day                Physical Exam   Constitutional: She is oriented to person, place, and time. She appears well-developed and well-nourished. She appears cachectic. She has a sickly appearance. No distress.   Cardiovascular: Normal rate and regular rhythm. Exam reveals no gallop.   No murmur heard.  Pulmonary/Chest: Effort normal. No respiratory distress. She has decreased breath sounds. She has no wheezes.   Abdominal: Soft. Bowel sounds are normal. She exhibits no distension. There is no abdominal tenderness.   Neurological: She is alert and oriented to person, place, and time.   Skin: Skin is warm and dry.       Significant Labs:     Recent Labs   Lab 11/10/20  0502   WBC 6.50   RBC 3.40*   HGB 10.0*   HCT 32.2*      MCV 95   MCH 29.4   MCHC 31.1*     Recent Labs   Lab 11/10/20  0502      K 4.3      CO2 26   BUN 34*   CREATININE 1.7*   MG 2.1     Recent Labs   Lab 11/09/20  1631   TROPONINI 0.010       Significant Imaging: Echocardiogram:   Transthoracic echo (TTE) complete (Cupid Only):   Results for orders placed or  performed during the hospital encounter of 11/09/20   Echo Color Flow Doppler? Yes   Result Value Ref Range    BSA 1.38 m2    TDI SEPTAL 0.09 m/s    LV LATERAL E/E' RATIO 17.57 m/s    LV SEPTAL E/E' RATIO 13.67 m/s    LA WIDTH 2.87 cm    TDI LATERAL 0.07 m/s    PV PEAK VELOCITY 1.21 cm/s    LVIDd 3.54 3.5 - 6.0 cm    IVS 0.81 0.6 - 1.1 cm    Posterior Wall 0.81 0.6 - 1.1 cm    Ao root annulus 2.97 cm    LVIDs 1.86 (A) 2.1 - 4.0 cm    FS 47 28 - 44 %    LA volume 39.55 cm3    STJ 2.13 cm    Ascending aorta 2.45 cm    LV mass 78.00 g    LA size 3.79 cm    RVDD 2.47 cm    TAPSE 1.63 cm    Left Ventricle Relative Wall Thickness 0.46 cm    AV mean gradient 6 mmHg    AV valve area 2.66 cm2    AV Velocity Ratio 0.90     AV index (prosthetic) 0.93     MV valve area p 1/2 method 4.28 cm2    E/A ratio 1.60     Mean e' 0.08 m/s    E wave decelartion time 177.10 msec    Pulm vein S/D ratio 1.02     LVOT diameter 1.91 cm    LVOT area 2.9 cm2    LVOT peak landon 1.50 m/s    LVOT peak VTI 26.54 cm    Ao peak landon 1.67 m/s    Ao VTI 28.58 cm    LVOT stroke volume 76.00 cm3    AV peak gradient 11 mmHg    E/E' ratio 15.38 m/s    MV Peak E Lnadon 1.23 m/s    TR Max Landon 2.65 m/s    MV stenosis pressure 1/2 time 51.36 ms    MV Peak A Landon 0.77 m/s    PV Peak S Landon 0.63 m/s    PV Peak D Landon 0.62 m/s    LV Systolic Volume 10.50 mL    LV Systolic Volume Index 7.5 mL/m2    LV Diastolic Volume 52.17 mL    LV Diastolic Volume Index 37.28 mL/m2    LA Volume Index 28.3 mL/m2    LV Mass Index 56 g/m2    RA Major Axis 4.00 cm    Left Atrium Minor Axis 3.96 cm    Left Atrium Major Axis 4.65 cm    Triscuspid Valve Regurgitation Peak Gradient 28 mmHg    RA Width 2.79 cm     Assessment and Plan:     * Acute on chronic heart failure  - echo in June 2020 with normal LVEF and indeterminate diastolic function; repeat echo pending  - ; CXR reviewed; on IV Lasix BID with 1.1 liters out  - consult for possible repeat thoracentesis  - SOB likeey multifactoral  given COPD as well as other medical issues; concerned about possibility of worsening pericardial effusion but less likely given normal HR and lack of significant hypotension; repeat echo pending await results      Pleural effusion  - moderate left pleural effusion and small right pleural effusion  - previous thoracentesis in June with only 30cc removed; mention per radiology about possibility of loculation  - defer further intervention to primary team and interventional radiology      ESRD (end stage renal disease) on dialysis  - on HD M-W-F; full treatment yesterday; appears at baseline dry weight per patient  - Nephrology for ongoing routine HD     Essential hypertension  - SBP 90s-100s overnight  - on low dose Norvasc at home but on hold currently; agree with continued holding  - may resume closer to discharge depending on BP         VTE Risk Mitigation (From admission, onward)         Ordered     enoxaparin injection 30 mg  Every 24 hours      11/10/20 0337     IP VTE HIGH RISK PATIENT  Once      11/09/20 1807     Place sequential compression device  Until discontinued      11/09/20 1807                Thank you for your consult. I will follow-up with patient. Please contact us if you have any additional questions.    HARPREET Grimaldo, ANP  Cardiology   [facility]

## 2020-11-10 NOTE — HPI
Ms. Quevedo is a 76 yo female with a pertinent history of CHF, ESRD on dialysis MWF, COPD who presented to the ED with increasing SOB x 2 days. She went to dialysis today but could not complete the treatment because she was so short of breath. She does not know how much fluid was taken off in dialysis today. She wears 2LNC at home and has been sleeping on 3 pillows to improve breathing. She denies chest pain, fevers, chills. She takes lasix daily and is compliant with her medications.     On chest Xray, she was found to have a new small right pleural effusion and a chronic moderate effusion on left side, possibly loculated. Bilateral rales on exam. No edema to BLE. Her BNP is 364. Negative troponin. She is extremely short of breath on exertion. She was started on IV lasix in the ED. She still makes urine.

## 2020-11-10 NOTE — ASSESSMENT & PLAN NOTE
- echo in June 2020 with normal LVEF and indeterminate diastolic function; repeat echo pending  - ; CXR reviewed; on IV Lasix BID with 1.1 liters out  - consult for possible repeat thoracentesis  - SOB likeey multifactoral given COPD as well as other medical issues; concerned about possibility of worsening pericardial effusion but less likely given normal HR and lack of significant hypotension; repeat echo pending await results     Osiris Ramey is a 48 year old female presenting for   Chief Complaint   Patient presents with   • Back Pain     sharp pains right low back; intermittent, mostly first thing in AM and HS x4 weeks;    • Pain     \"talya horse\" right upper/lower extremeties      Denies Latex allergy or sensitivity.    Medication verified, no changes  Refills needed today: No    Health Maintenance Due   Topic Date Due   • Pneumococcal Vaccine 0-64 (1 of 1 - PPSV23) 02/03/1975   • Colorectal Cancer Screening-Colonoscopy  02/03/2019   • Shingles Vaccine (1 of 2) 02/03/2019   • Depression Screening  03/27/2019   • Breast Cancer Screening  05/26/2019   • Influenza Vaccine (1) 09/01/2019     Patient is due for the topics as listed above; patient declined flu vaccination.

## 2020-11-10 NOTE — SUBJECTIVE & OBJECTIVE
Past Medical History:   Diagnosis Date    Acute congestive heart failure 02/10/2020    Anemia     Bilateral renal cysts     Cataract     Chronic LBP 7/26/2012    Chronic pain     CKD (chronic kidney disease), stage IV     COPD (chronic obstructive pulmonary disease)     Dehydration     Encounter for blood transfusion     HTN (hypertension)     Lumbar spondylosis     Melanoma     of the lip    Metabolic bone disease     Migraines, neuralgic     Osteoporosis     Primary osteoarthritis of both knees     s/p Rt TKA    Pulmonary embolism with infarction     Seizures 1972    x1 only    Subdeltoid bursitis, L>R. 9/27/2012    Ulcer     Vitamin D deficiency disease        Past Surgical History:   Procedure Laterality Date    BLADDER SUSPENSION      CATARACT EXTRACTION  11/18/13    left eye    CERVICAL LAMINECTOMY      x3, fusion x1    COLONOSCOPY  2009    FISTULOGRAM N/A 2/27/2020    Procedure: Fistulogram;  Surgeon: Noe Benitez MD;  Location: [facility] CATH LAB/EP;  Service: Cardiology;  Laterality: N/A;    HYSTERECTOMY      JOINT REPLACEMENT  2001    total right knee     LUMBAR LAMINECTOMY      x 3, fusion x1    OOPHORECTOMY      PERIPHERAL ANGIOGRAPHY N/A 3/9/2020    Procedure: Peripheral angiography;  Surgeon: Jacinto Henriquez MD;  Location: [facility] CATH LAB/EP;  Service: Cardiology;  Laterality: N/A;    PLACEMENT OF ARTERIOVENOUS GRAFT Left 1/21/2020    Procedure: INSERTION, GRAFT, ARTERIOVENOUS;  Surgeon: Lindsey Louie MD;  Location: [facility] OR;  Service: General;  Laterality: Left;    THROMBECTOMY Left 2/3/2020    Procedure: THROMBECTOMY;  Surgeon: Lindsey Louie MD;  Location: [facility] OR;  Service: General;  Laterality: Left;    THROMBECTOMY  3/9/2020    Procedure: Thrombectomy;  Surgeon: Jacinto Henriquez MD;  Location: [facility] CATH LAB/EP;  Service: Cardiology;;       Review of patient's allergies indicates:   Allergen Reactions    Aspirin      Other reaction(s): hx of ulcers     Tetracycline Swelling     Other reaction(s): Swelling    Penicillins Rash     Other reaction(s): Hives  Other reaction(s): Rash  Other reaction(s): Rash  Other reaction(s): Hives       No current facility-administered medications on file prior to encounter.      Current Outpatient Medications on File Prior to Encounter   Medication Sig    albuterol-ipratropium (DUO-NEB) 2.5 mg-0.5 mg/3 mL nebulizer solution Take 3 mLs by nebulization every 6 (six) hours as needed for Shortness of Breath. Rescue    allopurinoL (ZYLOPRIM) 100 MG tablet Take 1 tablet (100 mg total) by mouth on Tuesday, Thursday, Saturday, and Sunday.    amLODIPine (NORVASC) 2.5 MG tablet Take 2.5 mg by mouth once daily.     clonazePAM (KLONOPIN) 1 MG tablet TAKE 1 TABLET (1 MG TOTAL) BY MOUTH DAILY AS NEEDED (ON DIALYSIS DAYS FOR ANXIETY.).    cyproheptadine (PERIACTIN) 4 mg tablet Take 1 tablet (4 mg total) by mouth every 8 (eight) hours.    diphenhydrAMINE (BENADRYL) 25 mg capsule Take 25 mg by mouth every 6 (six) hours as needed for Itching.    epoetin hemant-epbx (RETACRIT) 10,000 unit/mL imjection Inject 0.5 mLs (5,000 Units total) into the skin every Mon, Wed, Fri.    escitalopram oxalate (LEXAPRO) 10 MG tablet Take 1 tablet (10 mg total) by mouth once daily.    fluticasone-umeclidin-vilanter (TRELEGY ELLIPTA) 100-62.5-25 mcg DsDv Inhale 1 puff by mouth into the lungs once daily.    furosemide (LASIX) 80 MG tablet Take 1 tablet (80 mg total) by mouth twice daily on non dialysis days Tuesday, Thursday, Saturday, and Sunday.    ergocalciferol (ERGOCALCIFEROL) 50,000 unit Cap Take 1 capsule by mouth once weekly for 10 weeks, then take 1 capsule by mouth once monthly.    furosemide (LASIX) 80 MG tablet Take 1 tablet (80 mg total) by mouth 2 (two) times daily.    HYDROcodone-acetaminophen (NORCO)  mg per tablet Take 1 tablet by mouth 3 (three) times daily as needed.    lidocaine-prilocaine (EMLA) cream Apply topically to left  arm prior to dialysis.    megestroL (MEGACE) 40 MG Tab take  1 tablet by mouth every day    ondansetron (ZOFRAN-ODT) 4 MG TbDL Dissolve one tablet under the tongue every 6 (six) hours as needed.    potassium chloride SA (K-DUR,KLOR-CON) 20 MEQ tablet Take 2 tablets (40 mEq total) by mouth once daily.    sodium bicarbonate 650 MG tablet Take 1 tablet (650 mg total) by mouth 2 (two) times daily.    traZODone (DESYREL) 100 MG tablet Take 100 mg by mouth nightly as needed for Insomnia.     Family History     Problem Relation (Age of Onset)    Arthritis Mother    Cancer Father    Cataracts Father, Sister    Diabetes Maternal Aunt    Glaucoma Cousin    Heart attack Maternal Grandfather    Heart disease Maternal Grandfather    Hypertension Father, Maternal Grandfather    Stroke Mother        Tobacco Use    Smoking status: Former Smoker     Types: Cigarettes    Smokeless tobacco: Former User     Quit date: 2/3/2015   Substance and Sexual Activity    Alcohol use: Not Currently     Comment: Rare    Drug use: No    Sexual activity: Never     Partners: Male     Review of Systems   Constitution: Negative for chills, decreased appetite, diaphoresis and fever.   Cardiovascular: Positive for dyspnea on exertion, orthopnea and paroxysmal nocturnal dyspnea. Negative for chest pain, claudication, cyanosis, irregular heartbeat, leg swelling, near-syncope, palpitations and syncope.   Respiratory: Negative for cough, hemoptysis, shortness of breath and wheezing.    Gastrointestinal: Negative for bloating, abdominal pain, constipation, diarrhea, melena, nausea and vomiting.   Neurological: Negative for dizziness and weakness.     Objective:     Vital Signs (Most Recent):  Temp: 97.5 °F (36.4 °C) (11/10/20 1158)  Pulse: 92 (11/10/20 1158)  Resp: 16 (11/10/20 1158)  BP: (!) 97/46 (11/10/20 1158)  SpO2: 98 % (11/10/20 0752) Vital Signs (24h Range):  Temp:  [97.5 °F (36.4 °C)-98.6 °F (37 °C)] 97.5 °F (36.4 °C)  Pulse:  []  92  Resp:  [16-29] 16  SpO2:  [95 %-100 %] 98 %  BP: ()/(46-66) 97/46     Weight: 43.5 kg (96 lb)  Body mass index is 17.56 kg/m².    SpO2: 98 %  O2 Device (Oxygen Therapy): nasal cannula      Intake/Output Summary (Last 24 hours) at 11/10/2020 1227  Last data filed at 11/10/2020 0511  Gross per 24 hour   Intake --   Output 1110 ml   Net -1110 ml       Lines/Drains/Airways     Drain                 Hemodialysis AV Graft Left upper arm -- days          Peripheral Intravenous Line                 Peripheral IV - Single Lumen 11/09/20 1630 20 G Right Forearm less than 1 day                Physical Exam   Constitutional: She is oriented to person, place, and time. She appears well-developed and well-nourished. She appears cachectic. She has a sickly appearance. No distress.   Cardiovascular: Normal rate and regular rhythm. Exam reveals no gallop.   No murmur heard.  Pulmonary/Chest: Effort normal. No respiratory distress. She has decreased breath sounds. She has no wheezes.   Abdominal: Soft. Bowel sounds are normal. She exhibits no distension. There is no abdominal tenderness.   Neurological: She is alert and oriented to person, place, and time.   Skin: Skin is warm and dry.       Significant Labs:     Recent Labs   Lab 11/10/20  0502   WBC 6.50   RBC 3.40*   HGB 10.0*   HCT 32.2*      MCV 95   MCH 29.4   MCHC 31.1*     Recent Labs   Lab 11/10/20  0502      K 4.3      CO2 26   BUN 34*   CREATININE 1.7*   MG 2.1     Recent Labs   Lab 11/09/20  1631   TROPONINI 0.010       Significant Imaging: Echocardiogram:   Transthoracic echo (TTE) complete (Cupid Only):   Results for orders placed or performed during the hospital encounter of 11/09/20   Echo Color Flow Doppler? Yes   Result Value Ref Range    BSA 1.38 m2    TDI SEPTAL 0.09 m/s    LV LATERAL E/E' RATIO 17.57 m/s    LV SEPTAL E/E' RATIO 13.67 m/s    LA WIDTH 2.87 cm    TDI LATERAL 0.07 m/s    PV PEAK VELOCITY 1.21 cm/s    LVIDd 3.54 3.5 - 6.0  cm    IVS 0.81 0.6 - 1.1 cm    Posterior Wall 0.81 0.6 - 1.1 cm    Ao root annulus 2.97 cm    LVIDs 1.86 (A) 2.1 - 4.0 cm    FS 47 28 - 44 %    LA volume 39.55 cm3    STJ 2.13 cm    Ascending aorta 2.45 cm    LV mass 78.00 g    LA size 3.79 cm    RVDD 2.47 cm    TAPSE 1.63 cm    Left Ventricle Relative Wall Thickness 0.46 cm    AV mean gradient 6 mmHg    AV valve area 2.66 cm2    AV Velocity Ratio 0.90     AV index (prosthetic) 0.93     MV valve area p 1/2 method 4.28 cm2    E/A ratio 1.60     Mean e' 0.08 m/s    E wave decelartion time 177.10 msec    Pulm vein S/D ratio 1.02     LVOT diameter 1.91 cm    LVOT area 2.9 cm2    LVOT peak landon 1.50 m/s    LVOT peak VTI 26.54 cm    Ao peak landon 1.67 m/s    Ao VTI 28.58 cm    LVOT stroke volume 76.00 cm3    AV peak gradient 11 mmHg    E/E' ratio 15.38 m/s    MV Peak E Landon 1.23 m/s    TR Max Landon 2.65 m/s    MV stenosis pressure 1/2 time 51.36 ms    MV Peak A Landon 0.77 m/s    PV Peak S Landon 0.63 m/s    PV Peak D Landon 0.62 m/s    LV Systolic Volume 10.50 mL    LV Systolic Volume Index 7.5 mL/m2    LV Diastolic Volume 52.17 mL    LV Diastolic Volume Index 37.28 mL/m2    LA Volume Index 28.3 mL/m2    LV Mass Index 56 g/m2    RA Major Axis 4.00 cm    Left Atrium Minor Axis 3.96 cm    Left Atrium Major Axis 4.65 cm    Triscuspid Valve Regurgitation Peak Gradient 28 mmHg    RA Width 2.79 cm

## 2020-11-10 NOTE — H&P
"[facility] Hospital Medicine  History & Physical    Patient Name: Katie Quevedo  MRN: [id number]  Admission Date: 11/9/2020  Attending Physician: Jennifer Bowles*   Primary Care Provider: Kingston Verduzco MD         Patient information was obtained from patient, past medical records and ER records.     Subjective:     Principal Problem:Acute on chronic heart failure    Chief Complaint:   Chief Complaint   Patient presents with    Shortness of Breath     pt is a dialysis pt, last completed dialysis this am, pt on home o2 at 2-3 liters at all times, pt reports increased SOB x 2-3 days " states every few mths i need to get fluid drained off my lungs"         HPI: Ms. Quevedo is a 76 yo female with a pertinent history of CHF, ESRD on dialysis MWF, COPD who presented to the ED with increasing SOB x 2 days. She went to dialysis today but could not complete the treatment because she was so short of breath. She does not know how much fluid was taken off in dialysis today. She wears 2LNC at home and has been sleeping on 3 pillows to improve breathing. She denies chest pain, fevers, chills. She takes lasix daily and is compliant with her medications.     On chest Xray, she was found to have a new small right pleural effusion and a chronic moderate effusion on left side, possibly loculated. Bilateral rales on exam. No edema to BLE. Her BNP is 364. Negative troponin. She is extremely short of breath on exertion. She was started on IV lasix in the ED. She still makes urine.       Past Medical History:   Diagnosis Date    Acute congestive heart failure 02/10/2020    Anemia     Bilateral renal cysts     Cataract     Chronic LBP 7/26/2012    Chronic pain     CKD (chronic kidney disease), stage IV     COPD (chronic obstructive pulmonary disease)     Dehydration     Encounter for blood transfusion     HTN (hypertension)     Lumbar spondylosis     Melanoma     of the lip    Metabolic bone disease     " Migraines, neuralgic     Osteoporosis     Primary osteoarthritis of both knees     s/p Rt TKA    Pulmonary embolism with infarction     Seizures 1972    x1 only    Subdeltoid bursitis, L>R. 9/27/2012    Ulcer     Vitamin D deficiency disease        Past Surgical History:   Procedure Laterality Date    BLADDER SUSPENSION      CATARACT EXTRACTION  11/18/13    left eye    CERVICAL LAMINECTOMY      x3, fusion x1    COLONOSCOPY  2009    FISTULOGRAM N/A 2/27/2020    Procedure: Fistulogram;  Surgeon: Noe Benitez MD;  Location: [facility] CATH LAB/EP;  Service: Cardiology;  Laterality: N/A;    HYSTERECTOMY      JOINT REPLACEMENT  2001    total right knee     LUMBAR LAMINECTOMY      x 3, fusion x1    OOPHORECTOMY      PERIPHERAL ANGIOGRAPHY N/A 3/9/2020    Procedure: Peripheral angiography;  Surgeon: Jacinto Henriquez MD;  Location: [facility] CATH LAB/EP;  Service: Cardiology;  Laterality: N/A;    PLACEMENT OF ARTERIOVENOUS GRAFT Left 1/21/2020    Procedure: INSERTION, GRAFT, ARTERIOVENOUS;  Surgeon: Lindsey Louie MD;  Location: [facility] OR;  Service: General;  Laterality: Left;    THROMBECTOMY Left 2/3/2020    Procedure: THROMBECTOMY;  Surgeon: Lindsey Louie MD;  Location: [facility] OR;  Service: General;  Laterality: Left;    THROMBECTOMY  3/9/2020    Procedure: Thrombectomy;  Surgeon: Jacinto Henriquez MD;  Location: [facility] CATH LAB/EP;  Service: Cardiology;;       Review of patient's allergies indicates:   Allergen Reactions    Aspirin      Other reaction(s): hx of ulcers    Tetracycline Swelling     Other reaction(s): Swelling    Penicillins Rash     Other reaction(s): Hives  Other reaction(s): Rash  Other reaction(s): Rash  Other reaction(s): Hives       No current facility-administered medications on file prior to encounter.      Current Outpatient Medications on File Prior to Encounter   Medication Sig    albuterol-ipratropium (DUO-NEB) 2.5 mg-0.5 mg/3 mL nebulizer solution Take 3 mLs by  nebulization every 6 (six) hours as needed for Shortness of Breath. Rescue    allopurinoL (ZYLOPRIM) 100 MG tablet Take 1 tablet (100 mg total) by mouth on Tuesday, Thursday, Saturday, and Sunday.    amLODIPine (NORVASC) 2.5 MG tablet Take 2.5 mg by mouth once daily.     clonazePAM (KLONOPIN) 1 MG tablet TAKE 1 TABLET (1 MG TOTAL) BY MOUTH DAILY AS NEEDED (ON DIALYSIS DAYS FOR ANXIETY.).    cyproheptadine (PERIACTIN) 4 mg tablet Take 1 tablet (4 mg total) by mouth every 8 (eight) hours.    diphenhydrAMINE (BENADRYL) 25 mg capsule Take 25 mg by mouth every 6 (six) hours as needed for Itching.    epoetin hemant-epbx (RETACRIT) 10,000 unit/mL imjection Inject 0.5 mLs (5,000 Units total) into the skin every Mon, Wed, Fri.    escitalopram oxalate (LEXAPRO) 10 MG tablet Take 1 tablet (10 mg total) by mouth once daily.    fluticasone-umeclidin-vilanter (TRELEGY ELLIPTA) 100-62.5-25 mcg DsDv Inhale 1 puff by mouth into the lungs once daily.    furosemide (LASIX) 80 MG tablet Take 1 tablet (80 mg total) by mouth twice daily on non dialysis days Tuesday, Thursday, Saturday, and Sunday.    ergocalciferol (ERGOCALCIFEROL) 50,000 unit Cap Take 1 capsule by mouth once weekly for 10 weeks, then take 1 capsule by mouth once monthly.    furosemide (LASIX) 80 MG tablet Take 1 tablet (80 mg total) by mouth 2 (two) times daily.    HYDROcodone-acetaminophen (NORCO)  mg per tablet Take 1 tablet by mouth 3 (three) times daily as needed.    lidocaine-prilocaine (EMLA) cream Apply topically to left arm prior to dialysis.    megestroL (MEGACE) 40 MG Tab take  1 tablet by mouth every day    ondansetron (ZOFRAN-ODT) 4 MG TbDL Dissolve one tablet under the tongue every 6 (six) hours as needed.    potassium chloride SA (K-DUR,KLOR-CON) 20 MEQ tablet Take 2 tablets (40 mEq total) by mouth once daily.    sodium bicarbonate 650 MG tablet Take 1 tablet (650 mg total) by mouth 2 (two) times daily.    traZODone (DESYREL) 100 MG  tablet Take 100 mg by mouth nightly as needed for Insomnia.     Family History     Problem Relation (Age of Onset)    Arthritis Mother    Cancer Father    Cataracts Father, Sister    Diabetes Maternal Aunt    Glaucoma Cousin    Heart attack Maternal Grandfather    Heart disease Maternal Grandfather    Hypertension Father, Maternal Grandfather    Stroke Mother        Tobacco Use    Smoking status: Former Smoker     Types: Cigarettes    Smokeless tobacco: Former User     Quit date: 2/3/2015   Substance and Sexual Activity    Alcohol use: Not Currently     Comment: Rare    Drug use: No    Sexual activity: Never     Partners: Male     Review of Systems   Constitutional: Positive for activity change. Negative for chills and fever.   HENT: Negative for congestion, sore throat and trouble swallowing.    Eyes: Negative for pain and visual disturbance.   Respiratory: Positive for shortness of breath. Negative for cough.    Cardiovascular: Negative for chest pain and leg swelling.   Gastrointestinal: Negative for abdominal pain, diarrhea, nausea and vomiting.   Genitourinary: Negative for difficulty urinating and dysuria.   Musculoskeletal: Positive for arthralgias.   Skin: Negative for rash and wound.   Neurological: Positive for weakness. Negative for dizziness, syncope and headaches.   Psychiatric/Behavioral: Negative for confusion.     Objective:     Vital Signs (Most Recent):  Temp: 98.6 °F (37 °C) (11/09/20 2324)  Pulse: 82 (11/10/20 0000)  Resp: (!) 21 (11/09/20 2324)  BP: (!) 101/53 (11/09/20 2324)  SpO2: 100 % (11/09/20 2324) Vital Signs (24h Range):  Temp:  [97.5 °F (36.4 °C)-98.6 °F (37 °C)] 98.6 °F (37 °C)  Pulse:  [81-99] 82  Resp:  [17-29] 21  SpO2:  [95 %-100 %] 100 %  BP: ()/(52-66) 101/53     Weight: 44.6 kg (98 lb 5.2 oz)  Body mass index is 17.98 kg/m².    Physical Exam  Vitals signs and nursing note reviewed.   Constitutional:       Appearance: She is cachectic.   HENT:      Head: Normocephalic  and atraumatic.      Mouth/Throat:      Mouth: Mucous membranes are moist.   Eyes:      Conjunctiva/sclera: Conjunctivae normal.   Neck:      Musculoskeletal: Neck supple.   Cardiovascular:      Rate and Rhythm: Normal rate and regular rhythm.      Pulses: Normal pulses.   Pulmonary:      Effort: Respiratory distress present.      Breath sounds: Decreased breath sounds and rales present.   Abdominal:      General: Bowel sounds are normal.      Palpations: Abdomen is soft.   Musculoskeletal: Normal range of motion.         General: No swelling.   Skin:     General: Skin is warm and dry.      Coloration: Skin is pale.   Neurological:      Mental Status: She is alert and oriented to person, place, and time.      Motor: Weakness present.   Psychiatric:         Mood and Affect: Mood normal.         Behavior: Behavior normal.         Thought Content: Thought content normal.             Significant Labs:   ABGs: No results for input(s): PH, PCO2, HCO3, POCSATURATED, BE, TOTALHB, COHB, METHB, O2HB, POCFIO2 in the last 48 hours.  CBC:   Recent Labs   Lab 11/09/20  1631   WBC 9.20   HGB 10.4*   HCT 32.7*        CMP:   Recent Labs   Lab 11/09/20  1631      K 4.3      CO2 24   GLU 80   BUN 27*   CREATININE 1.3   CALCIUM 9.3   PROT 7.9   ALBUMIN 3.9   BILITOT 1.2*   ALKPHOS 317*   AST 26   ALT 14   ANIONGAP 12   EGFRNONAA 40*     Cardiac Markers:   Recent Labs   Lab 11/09/20  1631   *     Coagulation: No results for input(s): PT, INR, APTT in the last 48 hours.  Lactic Acid: No results for input(s): LACTATE in the last 48 hours.  Magnesium: No results for input(s): MG in the last 48 hours.  POCT Glucose: No results for input(s): POCTGLUCOSE in the last 48 hours.  Troponin:   Recent Labs   Lab 11/09/20  1631   TROPONINI 0.010     TSH: No results for input(s): TSH in the last 4320 hours.  Urine Studies: No results for input(s): COLORU, APPEARANCEUA, PHUR, SPECGRAV, PROTEINUA, GLUCUA, KETONESU,  BILIRUBINUA, OCCULTUA, NITRITE, UROBILINOGEN, LEUKOCYTESUR, RBCUA, WBCUA, BACTERIA, SQUAMEPITHEL, HYALINECASTS in the last 48 hours.    Invalid input(s): FELIX    Significant Imaging: I have reviewed all pertinent imaging results/findings within the past 24 hours.    Assessment/Plan:     * Acute on chronic heart failure  SOB (shortness of breath)  Orthopnea    Bilateral rales  BNP elevated  Consult cardiology  Echo ordered  Cont supplemental O2  IV lasix 80 BID      Pleural effusion  Left side is chronic  New to the right side  Consult IR        ESRD (end stage renal disease) on dialysis    Dialysis MWF  Went today but was not able to complete due to SOB  Electrolytes WNL  Consult nephrology  Still makes urine    COPD (chronic obstructive pulmonary disease)  Patient uses trelegy-elllipta at home--we do not have that here  Start duo nebs PRN       Chronic respiratory failure with hypoxia, on home O2 therapy     Cont 2-3L NC to maintain sats          Essential hypertension    On amlodipine at home  Hold for SBP 90s        VTE Risk Mitigation (From admission, onward)         Ordered     IP VTE HIGH RISK PATIENT  Once      11/09/20 1807     Place sequential compression device  Until discontinued      11/09/20 1807                   Rachel Alexander NP  Department of Hospital Medicine   [facility]

## 2020-11-10 NOTE — PLAN OF CARE
TN met with pt and pt's spouse Dre at bedside for d/c planning. Pt lives with spouse who helps her at home. Pt's spouse drives her to her appointments and to HD. Pt goes to [facility] at 12pm. Dr.M. Diggs is pt's nephrologist. Pt has cane, rollator, and home O2 through [facility]. Pt's spouse verbalized understanding to bring pt's portable O2 to hospital for d/c to home.  Pt wants to follow up with  at [facility] care clinic.  is pt's pulmonologist. Pt wants bedside delivery for d/c meds.     This  put name on white board and explained blue discharge folder to patient. Discharge planning brochure and/or business card given to patient.  Patient verbalized understanding.       11/10/20 1320   Discharge Assessment   Assessment Type Discharge Planning Assessment   Confirmed/corrected address and phone number on facesheet? Yes   Assessment information obtained from? Patient;Medical Record;Caregiver   Expected Length of Stay (days) 3   Communicated expected length of stay with patient/caregiver yes   Prior to hospitilization cognitive status: Alert/Oriented   Prior to hospitalization functional status: Assistive Equipment   Current cognitive status: Alert/Oriented   Current Functional Status: Assistive Equipment   Lives With spouse   Able to Return to Prior Arrangements yes   Is patient able to care for self after discharge? Yes   Who are your caregiver(s) and their phone number(s)? Dre Quevedo (spouse) [phone]   Patient's perception of discharge disposition home or selfcare   Readmission Within the Last 30 Days no previous admission in last 30 days   Patient currently being followed by outpatient case management? No   Patient currently receives any other outside agency services? No   Equipment Currently Used at Home rollator;cane, straight   Do you have any problems affording any of your prescribed medications? No   Is the patient taking medications as prescribed? yes   Does  the patient have transportation home? Yes   Transportation Anticipated family or friend will provide   Does the patient receive services at the [facility]madin Clinic? No   Discharge Plan A Home with family   Discharge Plan B Home Health   DME Needed Upon Discharge    (TBD)   Patient/Family in Agreement with Plan yes

## 2020-11-10 NOTE — HPI
74yo female with HTN, chronic respiratory failure, COPD on home O2, ESRD on HD M-W-F, acute decompensated heart failure, pleural effusions and small pericardial effusion who presented to the ER with complaints of SOB. Ms. Quevedo complains of SOB for the past 2 days that progressively worsened. She was at dialysis yesterday and reports her SOB worsened therefore her treatment was cut short and she presented to the ER. She complains of SOB with minimal exertion ie walking from bedroom to bathroom but denies any chest pain or syncope. She endorses orthopnea and sleeps on 3 pillows and reports this has been present for about 2 years but has progressively worsened. She also endorses PND and occasional RLE edema. She reports being on HD since January 2020 and reports initially difficulty with completing her full runs. However lately her  has been going with her and she is able to do her full treatments and return to her dry weight. She does report approximately 30lb weight loss since January with weight down to 99lbs from 124-127lbs. She has not seen her PCP lately and is not up to date on her health screenings

## 2020-11-10 NOTE — ED NOTES
Pt. Sitting on the bedside commode. Tolerating well. Instructed to use the call light before returning to bed.

## 2020-11-10 NOTE — SUBJECTIVE & OBJECTIVE
Past Medical History:   Diagnosis Date    Acute congestive heart failure 02/10/2020    Anemia     Bilateral renal cysts     Cataract     Chronic LBP 7/26/2012    Chronic pain     CKD (chronic kidney disease), stage IV     COPD (chronic obstructive pulmonary disease)     Dehydration     Encounter for blood transfusion     HTN (hypertension)     Lumbar spondylosis     Melanoma     of the lip    Metabolic bone disease     Migraines, neuralgic     Osteoporosis     Primary osteoarthritis of both knees     s/p Rt TKA    Pulmonary embolism with infarction     Seizures 1972    x1 only    Subdeltoid bursitis, L>R. 9/27/2012    Ulcer     Vitamin D deficiency disease        Past Surgical History:   Procedure Laterality Date    BLADDER SUSPENSION      CATARACT EXTRACTION  11/18/13    left eye    CERVICAL LAMINECTOMY      x3, fusion x1    COLONOSCOPY  2009    FISTULOGRAM N/A 2/27/2020    Procedure: Fistulogram;  Surgeon: Noe Benitez MD;  Location: [facility] CATH LAB/EP;  Service: Cardiology;  Laterality: N/A;    HYSTERECTOMY      JOINT REPLACEMENT  2001    total right knee     LUMBAR LAMINECTOMY      x 3, fusion x1    OOPHORECTOMY      PERIPHERAL ANGIOGRAPHY N/A 3/9/2020    Procedure: Peripheral angiography;  Surgeon: Jacinto Henriquez MD;  Location: [facility] CATH LAB/EP;  Service: Cardiology;  Laterality: N/A;    PLACEMENT OF ARTERIOVENOUS GRAFT Left 1/21/2020    Procedure: INSERTION, GRAFT, ARTERIOVENOUS;  Surgeon: Lindsey Louie MD;  Location: [facility] OR;  Service: General;  Laterality: Left;    THROMBECTOMY Left 2/3/2020    Procedure: THROMBECTOMY;  Surgeon: Lindsey Louie MD;  Location: [facility] OR;  Service: General;  Laterality: Left;    THROMBECTOMY  3/9/2020    Procedure: Thrombectomy;  Surgeon: Jacinto Henriquez MD;  Location: [facility] CATH LAB/EP;  Service: Cardiology;;       Review of patient's allergies indicates:   Allergen Reactions    Aspirin      Other reaction(s): hx of ulcers     Tetracycline Swelling     Other reaction(s): Swelling    Penicillins Rash     Other reaction(s): Hives  Other reaction(s): Rash  Other reaction(s): Rash  Other reaction(s): Hives       No current facility-administered medications on file prior to encounter.      Current Outpatient Medications on File Prior to Encounter   Medication Sig    albuterol-ipratropium (DUO-NEB) 2.5 mg-0.5 mg/3 mL nebulizer solution Take 3 mLs by nebulization every 6 (six) hours as needed for Shortness of Breath. Rescue    allopurinoL (ZYLOPRIM) 100 MG tablet Take 1 tablet (100 mg total) by mouth on Tuesday, Thursday, Saturday, and Sunday.    amLODIPine (NORVASC) 2.5 MG tablet Take 2.5 mg by mouth once daily.     clonazePAM (KLONOPIN) 1 MG tablet TAKE 1 TABLET (1 MG TOTAL) BY MOUTH DAILY AS NEEDED (ON DIALYSIS DAYS FOR ANXIETY.).    cyproheptadine (PERIACTIN) 4 mg tablet Take 1 tablet (4 mg total) by mouth every 8 (eight) hours.    diphenhydrAMINE (BENADRYL) 25 mg capsule Take 25 mg by mouth every 6 (six) hours as needed for Itching.    epoetin hemant-epbx (RETACRIT) 10,000 unit/mL imjection Inject 0.5 mLs (5,000 Units total) into the skin every Mon, Wed, Fri.    escitalopram oxalate (LEXAPRO) 10 MG tablet Take 1 tablet (10 mg total) by mouth once daily.    fluticasone-umeclidin-vilanter (TRELEGY ELLIPTA) 100-62.5-25 mcg DsDv Inhale 1 puff by mouth into the lungs once daily.    furosemide (LASIX) 80 MG tablet Take 1 tablet (80 mg total) by mouth twice daily on non dialysis days Tuesday, Thursday, Saturday, and Sunday.    ergocalciferol (ERGOCALCIFEROL) 50,000 unit Cap Take 1 capsule by mouth once weekly for 10 weeks, then take 1 capsule by mouth once monthly.    furosemide (LASIX) 80 MG tablet Take 1 tablet (80 mg total) by mouth 2 (two) times daily.    HYDROcodone-acetaminophen (NORCO)  mg per tablet Take 1 tablet by mouth 3 (three) times daily as needed.    lidocaine-prilocaine (EMLA) cream Apply topically to left  arm prior to dialysis.    megestroL (MEGACE) 40 MG Tab take  1 tablet by mouth every day    ondansetron (ZOFRAN-ODT) 4 MG TbDL Dissolve one tablet under the tongue every 6 (six) hours as needed.    potassium chloride SA (K-DUR,KLOR-CON) 20 MEQ tablet Take 2 tablets (40 mEq total) by mouth once daily.    sodium bicarbonate 650 MG tablet Take 1 tablet (650 mg total) by mouth 2 (two) times daily.    traZODone (DESYREL) 100 MG tablet Take 100 mg by mouth nightly as needed for Insomnia.     Family History     Problem Relation (Age of Onset)    Arthritis Mother    Cancer Father    Cataracts Father, Sister    Diabetes Maternal Aunt    Glaucoma Cousin    Heart attack Maternal Grandfather    Heart disease Maternal Grandfather    Hypertension Father, Maternal Grandfather    Stroke Mother        Tobacco Use    Smoking status: Former Smoker     Types: Cigarettes    Smokeless tobacco: Former User     Quit date: 2/3/2015   Substance and Sexual Activity    Alcohol use: Not Currently     Comment: Rare    Drug use: No    Sexual activity: Never     Partners: Male     Review of Systems   Constitutional: Positive for activity change. Negative for chills and fever.   HENT: Negative for congestion, sore throat and trouble swallowing.    Eyes: Negative for pain and visual disturbance.   Respiratory: Positive for shortness of breath. Negative for cough.    Cardiovascular: Negative for chest pain and leg swelling.   Gastrointestinal: Negative for abdominal pain, diarrhea, nausea and vomiting.   Genitourinary: Negative for difficulty urinating and dysuria.   Musculoskeletal: Positive for arthralgias.   Skin: Negative for rash and wound.   Neurological: Positive for weakness. Negative for dizziness, syncope and headaches.   Psychiatric/Behavioral: Negative for confusion.     Objective:     Vital Signs (Most Recent):  Temp: 98.6 °F (37 °C) (11/09/20 2324)  Pulse: 82 (11/10/20 0000)  Resp: (!) 21 (11/09/20 2324)  BP: (!) 101/53  (11/09/20 2324)  SpO2: 100 % (11/09/20 2324) Vital Signs (24h Range):  Temp:  [97.5 °F (36.4 °C)-98.6 °F (37 °C)] 98.6 °F (37 °C)  Pulse:  [81-99] 82  Resp:  [17-29] 21  SpO2:  [95 %-100 %] 100 %  BP: ()/(52-66) 101/53     Weight: 44.6 kg (98 lb 5.2 oz)  Body mass index is 17.98 kg/m².    Physical Exam  Vitals signs and nursing note reviewed.   Constitutional:       Appearance: She is cachectic.   HENT:      Head: Normocephalic and atraumatic.      Mouth/Throat:      Mouth: Mucous membranes are moist.   Eyes:      Conjunctiva/sclera: Conjunctivae normal.   Neck:      Musculoskeletal: Neck supple.   Cardiovascular:      Rate and Rhythm: Normal rate and regular rhythm.      Pulses: Normal pulses.   Pulmonary:      Effort: Respiratory distress present.      Breath sounds: Decreased breath sounds and rales present.   Abdominal:      General: Bowel sounds are normal.      Palpations: Abdomen is soft.   Musculoskeletal: Normal range of motion.         General: No swelling.   Skin:     General: Skin is warm and dry.      Coloration: Skin is pale.   Neurological:      Mental Status: She is alert and oriented to person, place, and time.      Motor: Weakness present.   Psychiatric:         Mood and Affect: Mood normal.         Behavior: Behavior normal.         Thought Content: Thought content normal.             Significant Labs:   ABGs: No results for input(s): PH, PCO2, HCO3, POCSATURATED, BE, TOTALHB, COHB, METHB, O2HB, POCFIO2 in the last 48 hours.  CBC:   Recent Labs   Lab 11/09/20  1631   WBC 9.20   HGB 10.4*   HCT 32.7*        CMP:   Recent Labs   Lab 11/09/20  1631      K 4.3      CO2 24   GLU 80   BUN 27*   CREATININE 1.3   CALCIUM 9.3   PROT 7.9   ALBUMIN 3.9   BILITOT 1.2*   ALKPHOS 317*   AST 26   ALT 14   ANIONGAP 12   EGFRNONAA 40*     Cardiac Markers:   Recent Labs   Lab 11/09/20  1631   *     Coagulation: No results for input(s): PT, INR, APTT in the last 48 hours.  Lactic  Acid: No results for input(s): LACTATE in the last 48 hours.  Magnesium: No results for input(s): MG in the last 48 hours.  POCT Glucose: No results for input(s): POCTGLUCOSE in the last 48 hours.  Troponin:   Recent Labs   Lab 11/09/20  1631   TROPONINI 0.010     TSH: No results for input(s): TSH in the last 4320 hours.  Urine Studies: No results for input(s): COLORU, APPEARANCEUA, PHUR, SPECGRAV, PROTEINUA, GLUCUA, KETONESU, BILIRUBINUA, OCCULTUA, NITRITE, UROBILINOGEN, LEUKOCYTESUR, RBCUA, WBCUA, BACTERIA, SQUAMEPITHEL, HYALINECASTS in the last 48 hours.    Invalid input(s): FELIX    Significant Imaging: I have reviewed all pertinent imaging results/findings within the past 24 hours.

## 2020-11-10 NOTE — ED TRIAGE NOTES
Pt. Care assumed, pt. Is awake, alert and oriented. Skin is PWD. Pt. Is on the continuous cardiac, BP and pulse oximetry monitors. Respirations are even and non labored. Clear breath sounds heard in the upper lobes. Pt.s shunt to the left upper arm has a good thrill and bruit. Pt. Denies c/o pain or discomfort. Bed in the low position, side rails elevated x 2 and call light at the bedside. Will continue to monitor.

## 2020-11-10 NOTE — HOSPITAL COURSE
11/9/2020 Presented to the ER with complaints of SOB for past 2 days with progressive worsening. Admitted to [facility] for further treatment and evaluation   11/10/2020  Troponin .010. CBC and BMP WNL. Echocardiogram pending. CXR with moderate left pleural effusion unchanged and small right pleural effusion. On IV Lasix BID with 1.1 liters out overnight. Cardiology consulted for CHF exacerbation   11/11/2020 HR and BP stable overnight. Creatinine up to 2.7 from 1.7 yesterday-Lasix stopped. Scheduled for HD today. Echocardiogram yesterday with LVEF 70-75% PA 31mmHg. Ultrasound guided thoracentesis cancelled yesterday secondary to loculated pleural effusion. CBC WNL. SOB slightly improved but remains

## 2020-11-10 NOTE — ASSESSMENT & PLAN NOTE
- SBP 90s-100s overnight  - on low dose Norvasc at home but on hold currently; agree with continued holding  - may resume closer to discharge depending on BP

## 2020-11-10 NOTE — PROGRESS NOTES
[facility] Medicine  Progress Note    Patient Name: Katie Quevedo  MRN: [id number]  Patient Class: OP- Observation   Admission Date: 11/9/2020  Length of Stay: 0 days  Attending Physician: Rosa Del Toro MD  Primary Care Provider: Kingston Verduzco MD        Subjective:     Principal Problem:Acute on chronic heart failure        HPI:  Ms. Quevedo is a 74 yo female with a pertinent history of CHF, ESRD on dialysis MWF, COPD who presented to the ED with increasing SOB x 2 days. She went to dialysis today but could not complete the treatment because she was so short of breath. She does not know how much fluid was taken off in dialysis today. She wears 2LNC at home and has been sleeping on 3 pillows to improve breathing. She denies chest pain, fevers, chills. She takes lasix daily and is compliant with her medications.     On chest Xray, she was found to have a new small right pleural effusion and a chronic moderate effusion on left side, possibly loculated. Bilateral rales on exam. No edema to BLE. Her BNP is 364. Negative troponin. She is extremely short of breath on exertion. She was started on IV lasix in the ED. She still makes urine.       Overview/Hospital Course:  Pt placed in observation for evaluation of SOB.  IR consulted for thoracentesis, consolidated LLL with a trace loculated left pleural effusion. Volume too small to warrant thoracentesis.  Pulmonology consulted, formal recs to follow but recommended CT chest without contrast.  Nephrology consulted, agree with pulm eval, VICENTA 11/11.    Interval History:  no acute events overnight, no new complaints.  Pt reports SOB is the same, not better or worse.  Pt on 1L NC.    Review of Systems   Constitutional: Positive for activity change. Negative for chills and fever.   HENT: Negative for congestion, sore throat and trouble swallowing.    Eyes: Negative for pain and visual disturbance.   Respiratory: Positive for shortness of breath. Negative for  cough.    Cardiovascular: Negative for chest pain and leg swelling.   Gastrointestinal: Negative for abdominal pain, diarrhea, nausea and vomiting.   Genitourinary: Negative for difficulty urinating and dysuria.   Musculoskeletal: Positive for arthralgias.   Skin: Negative for rash and wound.   Neurological: Positive for weakness. Negative for dizziness, syncope and headaches.   Psychiatric/Behavioral: Negative for confusion.     Objective:     Vital Signs (Most Recent):  Temp: 97.2 °F (36.2 °C) (11/10/20 1630)  Pulse: 86 (11/10/20 1630)  Resp: 16 (11/10/20 1630)  BP: (!) 96/51 (11/10/20 1630)  SpO2: 97 % (11/10/20 1523) Vital Signs (24h Range):  Temp:  [97.2 °F (36.2 °C)-98.6 °F (37 °C)] 97.2 °F (36.2 °C)  Pulse:  [] 86  Resp:  [16-29] 16  SpO2:  [95 %-100 %] 97 %  BP: ()/(46-61) 96/51     Weight: 43.5 kg (96 lb)  Body mass index is 17.56 kg/m².    Intake/Output Summary (Last 24 hours) at 11/10/2020 1722  Last data filed at 11/10/2020 0511  Gross per 24 hour   Intake --   Output 1110 ml   Net -1110 ml      Physical Exam  Vitals signs and nursing note reviewed.   Constitutional:       Appearance: She is cachectic.   HENT:      Head: Normocephalic and atraumatic.      Mouth/Throat:      Mouth: Mucous membranes are moist.   Eyes:      Conjunctiva/sclera: Conjunctivae normal.   Neck:      Musculoskeletal: Neck supple.   Cardiovascular:      Rate and Rhythm: Normal rate and regular rhythm.      Pulses: Normal pulses.   Pulmonary:      Effort: Respiratory distress present.      Breath sounds: Decreased breath sounds and rales present.   Abdominal:      General: Bowel sounds are normal.      Palpations: Abdomen is soft.   Musculoskeletal: Normal range of motion.         General: No swelling.   Skin:     General: Skin is warm and dry.      Coloration: Skin is pale.   Neurological:      Mental Status: She is alert and oriented to person, place, and time.      Motor: Weakness present.   Psychiatric:         Mood  and Affect: Mood normal.         Behavior: Behavior normal.         Thought Content: Thought content normal.         Significant Labs: All pertinent labs within the past 24 hours have been reviewed.    Significant Imaging: I have reviewed all pertinent imaging results/findings within the past 24 hours.      Assessment/Plan:      * Acute on chronic heart failure  SOB (shortness of breath)  Orthopnea    Bilateral rales  BNP elevated  Consult cardiology  Echo ordered  Cont supplemental O2  IV lasix 80 BID    Intake/Output Summary (Last 24 hours) at 11/10/2020 1724  Last data filed at 11/10/2020 0511  Gross per 24 hour   Intake --   Output 1110 ml   Net -1110 ml         Pleural effusion  Left side is chronic  New to the right side  Consulted IR: Consolidated LLL with a trace loculated left pleural effusion. Volume too small to warrant thoracentesis.   Nephrology recommended pulmonology consult.  Pulm will eval on 11/11 but recommended CT Chest, discussed case with pulm and workup in the past has been transudative.        ESRD (end stage renal disease) on dialysis    Dialysis MWF  Went today but was not able to complete due to SOB  Electrolytes WNL  Consulted nephrology, HD 11/11  Still makes urine    COPD (chronic obstructive pulmonary disease)  Patient uses trelegy-elllipta at home--we do not have that here  Start duo nebs PRN       Chronic respiratory failure with hypoxia, on home O2 therapy   Cont 2-3L NC to maintain sats          Essential hypertension  On amlodipine at home  Hold for SBP 90s        VTE Risk Mitigation (From admission, onward)         Ordered     enoxaparin injection 30 mg  Every 24 hours      11/10/20 0337     IP VTE HIGH RISK PATIENT  Once      11/09/20 1807     Place sequential compression device  Until discontinued      11/09/20 1807                Discharge Planning   LAITH:      Code Status: Full Code   Is the patient medically ready for discharge?:     Reason for patient still in hospital  (select all that apply): Patient trending condition and Consult recommendations  Discharge Plan A: Home with family                  Teresa Mccracken PA-C  Department of Hospital Medicine   [facility]

## 2020-11-10 NOTE — ASSESSMENT & PLAN NOTE
SOB (shortness of breath)  Orthopnea    Bilateral rales  BNP elevated  Consult cardiology  Echo ordered  Cont supplemental O2  IV lasix 80 BID    Intake/Output Summary (Last 24 hours) at 11/10/2020 1724  Last data filed at 11/10/2020 0511  Gross per 24 hour   Intake --   Output 1110 ml   Net -1110 ml

## 2020-11-10 NOTE — HOSPITAL COURSE
Pt placed in observation for evaluation of SOB. Cardiology was consulted. The patient was diuresed with lasix. IR consulted for thoracentesis, consolidated LLL with a trace loculated left pleural effusion. Volume too small to warrant thoracentesis.  Pulmonology consulted, formal recs to follow but recommended CT chest without contrast. Will follow up outpatient. Nephrology consulted. The patient received dialysis while here and tolerated well with 3L removed on 10/11. After diuresing and a full dialysis session, the patient felt she was back to her baseline. This admission was likely a result of combination of factors with COPD, ESRD, and CHF. The patient wears home oxygen and is normally dyspneic on exertion. She has been afebrile and her lung sounds have improved while admitted. The patient was discharged home on 11/12/20 and instructed to stop amlodipine until she follows up with PCP. Her blood pressures have been low while here. She was instructed on follow up appts with pulmonology and PCP, and to continue her dialysis sessions as scheduled.

## 2020-11-11 LAB
ANION GAP SERPL CALC-SCNC: 12 MMOL/L (ref 8–16)
BASOPHILS # BLD AUTO: 0.09 K/UL (ref 0–0.2)
BASOPHILS NFR BLD: 1.4 % (ref 0–1.9)
BUN SERPL-MCNC: 52 MG/DL (ref 8–23)
CALCIUM SERPL-MCNC: 8.5 MG/DL (ref 8.7–10.5)
CHLORIDE SERPL-SCNC: 99 MMOL/L (ref 95–110)
CO2 SERPL-SCNC: 25 MMOL/L (ref 23–29)
CREAT SERPL-MCNC: 2.7 MG/DL (ref 0.5–1.4)
DIFFERENTIAL METHOD: ABNORMAL
EOSINOPHIL # BLD AUTO: 0.4 K/UL (ref 0–0.5)
EOSINOPHIL NFR BLD: 5.4 % (ref 0–8)
ERYTHROCYTE [DISTWIDTH] IN BLOOD BY AUTOMATED COUNT: 15.5 % (ref 11.5–14.5)
EST. GFR  (AFRICAN AMERICAN): 19 ML/MIN/1.73 M^2
EST. GFR  (NON AFRICAN AMERICAN): 16 ML/MIN/1.73 M^2
GLUCOSE SERPL-MCNC: 81 MG/DL (ref 70–110)
HCT VFR BLD AUTO: 29.5 % (ref 37–48.5)
HGB BLD-MCNC: 9.1 G/DL (ref 12–16)
IMM GRANULOCYTES # BLD AUTO: 0.01 K/UL (ref 0–0.04)
IMM GRANULOCYTES NFR BLD AUTO: 0.2 % (ref 0–0.5)
LYMPHOCYTES # BLD AUTO: 1.9 K/UL (ref 1–4.8)
LYMPHOCYTES NFR BLD: 28.1 % (ref 18–48)
MAGNESIUM SERPL-MCNC: 2 MG/DL (ref 1.6–2.6)
MCH RBC QN AUTO: 29.4 PG (ref 27–31)
MCHC RBC AUTO-ENTMCNC: 30.8 G/DL (ref 32–36)
MCV RBC AUTO: 96 FL (ref 82–98)
MONOCYTES # BLD AUTO: 0.8 K/UL (ref 0.3–1)
MONOCYTES NFR BLD: 12.2 % (ref 4–15)
NEUTROPHILS # BLD AUTO: 3.5 K/UL (ref 1.8–7.7)
NEUTROPHILS NFR BLD: 52.7 % (ref 38–73)
NRBC BLD-RTO: 0 /100 WBC
PLATELET # BLD AUTO: 231 K/UL (ref 150–350)
PMV BLD AUTO: 10.5 FL (ref 9.2–12.9)
POTASSIUM SERPL-SCNC: 4.5 MMOL/L (ref 3.5–5.1)
RBC # BLD AUTO: 3.09 M/UL (ref 4–5.4)
SODIUM SERPL-SCNC: 136 MMOL/L (ref 136–145)
WBC # BLD AUTO: 6.62 K/UL (ref 3.9–12.7)

## 2020-11-11 PROCEDURE — 63600175 PHARM REV CODE 636 W HCPCS: Mod: HCNC | Performed by: NURSE PRACTITIONER

## 2020-11-11 PROCEDURE — 86704 HEP B CORE ANTIBODY TOTAL: CPT | Mod: HCNC

## 2020-11-11 PROCEDURE — 94761 N-INVAS EAR/PLS OXIMETRY MLT: CPT | Mod: HCNC

## 2020-11-11 PROCEDURE — 96372 THER/PROPH/DIAG INJ SC/IM: CPT

## 2020-11-11 PROCEDURE — 27000221 HC OXYGEN, UP TO 24 HOURS: Mod: HCNC

## 2020-11-11 PROCEDURE — 25000003 PHARM REV CODE 250: Mod: HCNC | Performed by: NURSE PRACTITIONER

## 2020-11-11 PROCEDURE — 36415 COLL VENOUS BLD VENIPUNCTURE: CPT | Mod: HCNC

## 2020-11-11 PROCEDURE — 83735 ASSAY OF MAGNESIUM: CPT | Mod: HCNC

## 2020-11-11 PROCEDURE — 96376 TX/PRO/DX INJ SAME DRUG ADON: CPT

## 2020-11-11 PROCEDURE — G0257 UNSCHED DIALYSIS ESRD PT HOS: HCPCS

## 2020-11-11 PROCEDURE — 99214 OFFICE O/P EST MOD 30 MIN: CPT | Mod: HCNC,,, | Performed by: NURSE PRACTITIONER

## 2020-11-11 PROCEDURE — 99900035 HC TECH TIME PER 15 MIN (STAT): Mod: HCNC

## 2020-11-11 PROCEDURE — 99214 PR OFFICE/OUTPT VISIT, EST, LEVL IV, 30-39 MIN: ICD-10-PCS | Mod: HCNC,,, | Performed by: NURSE PRACTITIONER

## 2020-11-11 PROCEDURE — G0378 HOSPITAL OBSERVATION PER HR: HCPCS | Mod: HCNC

## 2020-11-11 PROCEDURE — 85025 COMPLETE CBC W/AUTO DIFF WBC: CPT | Mod: HCNC

## 2020-11-11 PROCEDURE — 86706 HEP B SURFACE ANTIBODY: CPT | Mod: HCNC

## 2020-11-11 PROCEDURE — 87340 HEPATITIS B SURFACE AG IA: CPT | Mod: HCNC

## 2020-11-11 PROCEDURE — 25000003 PHARM REV CODE 250: Mod: HCNC | Performed by: INTERNAL MEDICINE

## 2020-11-11 PROCEDURE — 63600175 PHARM REV CODE 636 W HCPCS: Mod: HCNC | Performed by: FAMILY MEDICINE

## 2020-11-11 PROCEDURE — 80048 BASIC METABOLIC PNL TOTAL CA: CPT | Mod: HCNC

## 2020-11-11 PROCEDURE — 80100016 HC MAINTENANCE HEMODIALYSIS: Mod: HCNC

## 2020-11-11 RX ADMIN — SODIUM CHLORIDE 400 ML: 0.9 INJECTION, SOLUTION INTRAVENOUS at 01:11

## 2020-11-11 RX ADMIN — MUPIROCIN: 20 OINTMENT TOPICAL at 08:11

## 2020-11-11 RX ADMIN — ACETAMINOPHEN 650 MG: 325 TABLET ORAL at 01:11

## 2020-11-11 RX ADMIN — ESCITALOPRAM OXALATE 10 MG: 10 TABLET ORAL at 08:11

## 2020-11-11 RX ADMIN — CYPROHEPTADINE HYDROCHLORIDE 4 MG: 4 TABLET ORAL at 05:11

## 2020-11-11 RX ADMIN — CYPROHEPTADINE HYDROCHLORIDE 4 MG: 4 TABLET ORAL at 09:11

## 2020-11-11 RX ADMIN — MUPIROCIN: 20 OINTMENT TOPICAL at 09:11

## 2020-11-11 RX ADMIN — FUROSEMIDE 80 MG: 10 INJECTION, SOLUTION INTRAVENOUS at 05:11

## 2020-11-11 RX ADMIN — CYPROHEPTADINE HYDROCHLORIDE 4 MG: 4 TABLET ORAL at 03:11

## 2020-11-11 RX ADMIN — ENOXAPARIN SODIUM 30 MG: 100 INJECTION SUBCUTANEOUS at 05:11

## 2020-11-11 RX ADMIN — CLONAZEPAM 1 MG: 0.5 TABLET ORAL at 09:11

## 2020-11-11 NOTE — PLAN OF CARE
Pt is AAOx4. Pt has no c/o discomfort or SOB at this time. Pt does however get SOB when she gets up to the BSC. Pt BP manually taken when SBP under 90 to ensure accurate reading. Pt is asymptomatic for hypotension at this time. Pt appears to be sleeping between rounds. Pt comfort and safety maintained. Will continue to monitor.

## 2020-11-11 NOTE — ASSESSMENT & PLAN NOTE
- moderate left pleural effusion and small right pleural effusion  - previous thoracentesis in June with only 30cc removed; ultrasound yesterday with loculated pleural effusion with minimal fluid therefore no therapeutic thoracentesis performed

## 2020-11-11 NOTE — PROGRESS NOTES
[facility]  Cardiology  Progress Note    Patient Name: Katie Quevedo  MRN: [id number]  Admission Date: 11/9/2020  Hospital Length of Stay: 0 days  Code Status: Full Code   Attending Physician: Rosa Del Troo MD   Primary Care Physician: Kingston Verduzco MD  Expected Discharge Date:   Principal Problem:Acute on chronic heart failure    Subjective:     Hospital Course:   11/9/2020 Presented to the ER with complaints of SOB for past 2 days with progressive worsening. Admitted to [facility] for further treatment and evaluation   11/10/2020  Troponin .010. CBC and BMP WNL. Echocardiogram pending. CXR with moderate left pleural effusion unchanged and small right pleural effusion. On IV Lasix BID with 1.1 liters out overnight. Cardiology consulted for CHF exacerbation   11/11/2020 HR and BP stable overnight. Creatinine up to 2.7 from 1.7 yesterday-Lasix stopped. Scheduled for HD today. Echocardiogram yesterday with LVEF 70-75% PA 31mmHg. Ultrasound guided thoracentesis cancelled yesterday secondary to loculated pleural effusion. CBC WNL. SOB slightly improved but remains         Review of Systems   Constitution: Negative for chills, decreased appetite, diaphoresis and fever.   Cardiovascular: Positive for dyspnea on exertion. Negative for chest pain, claudication, cyanosis, irregular heartbeat, leg swelling, near-syncope, orthopnea, palpitations, paroxysmal nocturnal dyspnea and syncope.   Respiratory: Negative for cough, hemoptysis, shortness of breath and wheezing.    Gastrointestinal: Negative for bloating, abdominal pain, constipation, diarrhea, melena, nausea and vomiting.   Neurological: Negative for dizziness and weakness.     Objective:     Vital Signs (Most Recent):  Temp: 97.7 °F (36.5 °C) (11/11/20 0911)  Pulse: 81 (11/11/20 0911)  Resp: 20 (11/11/20 0911)  BP: 101/78 (11/11/20 0911)  SpO2: 95 % (11/11/20 0850) Vital Signs (24h Range):  Temp:  [97.2 °F (36.2 °C)-98.2 °F (36.8 °C)]  97.7 °F (36.5 °C)  Pulse:  [79-98] 81  Resp:  [16-20] 20  SpO2:  [95 %-100 %] 95 %  BP: ()/(46-78) 101/78     Weight: 49.5 kg (109 lb 2 oz)  Body mass index is 19.96 kg/m².     SpO2: 95 %  O2 Device (Oxygen Therapy): nasal cannula      Intake/Output Summary (Last 24 hours) at 11/11/2020 1015  Last data filed at 11/10/2020 2102  Gross per 24 hour   Intake --   Output 100 ml   Net -100 ml       Lines/Drains/Airways     Drain                 Hemodialysis AV Graft Left upper arm -- days          Peripheral Intravenous Line                 Peripheral IV - Single Lumen 11/09/20 1630 20 G Right Forearm 1 day                Physical Exam   Constitutional: She is oriented to person, place, and time. She appears well-developed and well-nourished. No distress.   Cardiovascular: Normal rate and regular rhythm. Exam reveals no gallop.   No murmur heard.  Pulmonary/Chest: Effort normal and breath sounds normal. No tachypnea. No respiratory distress. She has no wheezes.   Abdominal: Soft. Bowel sounds are normal. She exhibits no distension. There is no abdominal tenderness.   Neurological: She is alert and oriented to person, place, and time.   Skin: Skin is warm and dry.       Significant Labs:     Recent Labs   Lab 11/11/20  0451   WBC 6.62   RBC 3.09*   HGB 9.1*   HCT 29.5*      MCV 96   MCH 29.4   MCHC 30.8*     Recent Labs   Lab 11/11/20  0451      K 4.5   CL 99   CO2 25   BUN 52*   CREATININE 2.7*   MG 2.0       Significant Imaging: Echocardiogram:   Transthoracic echo (TTE) complete (Cupid Only):   Results for orders placed or performed during the hospital encounter of 11/09/20   Echo Color Flow Doppler? Yes   Result Value Ref Range    BSA 1.38 m2    TDI SEPTAL 0.09 m/s    LV LATERAL E/E' RATIO 17.57 m/s    LV SEPTAL E/E' RATIO 13.67 m/s    LA WIDTH 2.87 cm    TDI LATERAL 0.07 m/s    PV PEAK VELOCITY 1.21 cm/s    LVIDd 3.54 3.5 - 6.0 cm    IVS 0.81 0.6 - 1.1 cm    Posterior Wall 0.81 0.6 - 1.1 cm    Ao  root annulus 2.97 cm    LVIDs 1.86 (A) 2.1 - 4.0 cm    FS 47 28 - 44 %    LA volume 39.55 cm3    STJ 2.13 cm    Ascending aorta 2.45 cm    LV mass 78.00 g    LA size 3.79 cm    RVDD 2.47 cm    TAPSE 1.63 cm    Left Ventricle Relative Wall Thickness 0.46 cm    AV mean gradient 6 mmHg    AV valve area 2.66 cm2    AV Velocity Ratio 0.90     AV index (prosthetic) 0.93     MV valve area p 1/2 method 4.28 cm2    E/A ratio 1.60     Mean e' 0.08 m/s    E wave decelartion time 177.10 msec    Pulm vein S/D ratio 1.02     LVOT diameter 1.91 cm    LVOT area 2.9 cm2    LVOT peak landon 1.50 m/s    LVOT peak VTI 26.54 cm    Ao peak landon 1.67 m/s    Ao VTI 28.58 cm    LVOT stroke volume 76.00 cm3    AV peak gradient 11 mmHg    E/E' ratio 15.38 m/s    MV Peak E Landon 1.23 m/s    TR Max Landon 2.65 m/s    MV stenosis pressure 1/2 time 51.36 ms    MV Peak A Landon 0.77 m/s    PV Peak S Landon 0.63 m/s    PV Peak D Landon 0.62 m/s    LV Systolic Volume 10.50 mL    LV Systolic Volume Index 7.5 mL/m2    LV Diastolic Volume 52.17 mL    LV Diastolic Volume Index 37.28 mL/m2    LA Volume Index 28.3 mL/m2    LV Mass Index 56 g/m2    RA Major Axis 4.00 cm    Left Atrium Minor Axis 3.96 cm    Left Atrium Major Axis 4.65 cm    Triscuspid Valve Regurgitation Peak Gradient 28 mmHg    RA Width 2.79 cm    Right Atrial Pressure (from IVC) 3 mmHg    TV rest pulmonary artery pressure 31 mmHg    Narrative    · The left ventricle is normal in size with hyperdynamic systolic   function. The estimated ejection fraction is 70-75%.  · There is left ventricular concentric remodeling.  · Indeterminate diastolic function.  · The estimated PA systolic pressure is 31 mmHg.  · Mild right ventricular enlargement with normal right ventricular   systolic function.  · Normal central venous pressure (3 mmHg).        Assessment and Plan:     Brief HPI: Seen on AM NP rounds with  at the bedside. Reports SOB remains but slightly improved. Discussed POC as detailed below-verbalized  understanding and agrees with POC     * Acute on chronic heart failure  - echo in June 2020 with normal LVEF and indeterminate diastolic function; repeat echo with EF 70-75% and indeterminate diastolic function; no LVOT gradient present  - ; CXR reviewed; treated with IV Lasix with only 100cc documented out overnight; negative 1.2 liters since admission; creatinine up to 2.7 from 1.7 yesterday-will hold IV Lasix BID  - feel her SOB could more COPD/pulmonary related at this time; nothing to suggest continued acute volume overload   - followed by Pulmonary as an outpatient with no recent visit per patient; requesting to see Pulmonary as an inpatient but will defer to primary team     Pleural effusion  - moderate left pleural effusion and small right pleural effusion  - previous thoracentesis in June with only 30cc removed; ultrasound yesterday with loculated pleural effusion with minimal fluid therefore no therapeutic thoracentesis performed     ESRD (end stage renal disease) on dialysis  - on HD M-W-F; full treatment on Monday as an outpatient; plan for HD today; appears at baseline dry weight per patient  - Nephrology on board     Essential hypertension  - SBP 90s-120s overnight  - on low dose Norvasc at home but on hold currently; agree with continued holding  - may resume closer to discharge depending on BP         VTE Risk Mitigation (From admission, onward)         Ordered     enoxaparin injection 30 mg  Every 24 hours      11/10/20 0337     IP VTE HIGH RISK PATIENT  Once      11/09/20 1807     Place sequential compression device  Until discontinued      11/09/20 1807                HARPREET Grimaldo, ANP  Cardiology  [facility]

## 2020-11-11 NOTE — PLAN OF CARE
Problem: Adult Inpatient Plan of Care  Goal: Plan of Care Review  Outcome: Ongoing, Progressing  VIRTUAL NURSE: Amando, notes and care plan reviewed

## 2020-11-11 NOTE — PROGRESS NOTES
Nephrology Progress note      Consult Requested By: Rosa Del Toro MD  Reason for Consult: ESRD    SUBJECTIVE:     History of Present Illness:  Patient is a 75 y.o. female presents with SOB. ESRD on HD with recurrent pleural effusion      Review of Systems   Constitutional: Negative for chills and fever.   Respiratory: Positive for shortness of breath. Negative for cough.    Cardiovascular: Negative for chest pain and leg swelling.   Gastrointestinal: Negative for nausea.        OBJECTIVE:     Vital Signs (Most Recent)  Vitals:    11/11/20 1255 11/11/20 1315 11/11/20 1430 11/11/20 1555   BP: 115/66 113/66 (!) 98/52    BP Location:  Right arm Right arm    Patient Position:  Lying     Pulse: 101 106 97 81   Resp:  18 20    Temp:  97.8 °F (36.6 °C) 98.3 °F (36.8 °C)    TempSrc:  Oral Oral    SpO2:   99%    Weight:       Height:             Date 11/11/20 0700 - 11/12/20 0659   Shift 9705-3071 8702-2563 0745-9658 24 Hour Total   INTAKE   Other 400   400   Shift Total(mL/kg) 400(8.1)   400(8.1)   OUTPUT   Other 2400   2400   Shift Total(mL/kg) 2400(48.5)   2400(48.5)   Weight (kg) 49.5 49.5 49.5 49.5           Medications:   allopurinoL  100 mg Oral Every Tues, Thurs, Sat, Sun    cyproheptadine  4 mg Oral Q8H    enoxaparin  30 mg Subcutaneous Q24H    escitalopram oxalate  10 mg Oral Daily    mupirocin   Nasal BID           Physical Exam   Constitutional: She is oriented to person, place, and time and well-developed, well-nourished, and in no distress. No distress.   HENT:   Head: Normocephalic and atraumatic.   Mouth/Throat: Oropharynx is clear and moist.   Eyes: EOM are normal. No scleral icterus.   Neck: Neck supple. No JVD present.   Cardiovascular: Normal rate and regular rhythm. Exam reveals no friction rub.   No murmur heard.  Pulmonary/Chest: Effort normal and breath sounds normal. No respiratory distress. She has no wheezes. She has no rales.   Abdominal: Soft. Bowel sounds are normal. She exhibits no  distension. There is no abdominal tenderness.   Musculoskeletal:         General: No edema.   Neurological: She is alert and oriented to person, place, and time.   Skin: Skin is warm and dry. No rash noted. She is not diaphoretic. No erythema.   Psychiatric: Affect normal.       Laboratory:  Recent Labs   Lab 11/09/20  1631 11/10/20  0502 11/11/20  0451   WBC 9.20 6.50 6.62   HGB 10.4* 10.0* 9.1*   HCT 32.7* 32.2* 29.5*    224 231   MONO 10.4  1.0 11.8  0.8 12.2  0.8     Recent Labs   Lab 11/09/20  1631 11/10/20  0502 11/11/20  0451    139 136   K 4.3 4.3 4.5    101 99   CO2 24 26 25   BUN 27* 34* 52*   CREATININE 1.3 1.7* 2.7*   CALCIUM 9.3 9.6 8.5*       Diagnostic Results:  X-Ray: Reviewed  US: Reviewed  Echo: Reviewed  ASSESSMENT/PLAN:     1. ESRD (N18.6 Z99.2) - usual HD on MWF with Dr. Aura Diggs   Completed HD today    2. HTN -\UF as tolerated on dialysis  3. MBD - at goal with HEcterol IV with Hd, not on binders  Lab Results   Component Value Date    .0 (H) 12/28/2018    CALCIUM 8.5 (L) 11/11/2020    PHOS 4.1 02/28/2020     Recent Labs   Lab 11/10/20  0502 11/11/20  0451   MG 2.1 2.0     Lab Results   Component Value Date    [id number] 26 (L) 02/12/2020     Lab Results   Component Value Date    CO2 25 11/11/2020     4. ANemia of CKD -   At goal, no epo today  Recent Labs   Lab 11/09/20  1631 11/10/20  0502 11/11/20  0451   WBC 9.20 6.50 6.62   HGB 10.4* 10.0* 9.1*   HCT 32.7* 32.2* 29.5*    224 231     Lab Results   Component Value Date    IRON 13 (L) 02/12/2020    TIBC 462 (H) 02/12/2020    FERRITIN 148 07/17/2019             5. Access -AVF follows with Dr. Louie  6. Nutrition - renal diet, nepro, renal vitamins        Thank you for consult, will follow  With any question please call [phone]  Yadi Mitchell    [facility]  EDGARD Bustillos MD, NEIL SAMS MD,   MD JAC Benoit, NP  [address]  [place], [address]  [phone]  [phone]

## 2020-11-11 NOTE — PLAN OF CARE
Progress notes reviewed .Evening rounds completed introduced self as VN for this shift . Educated pt. On VN's role in patient's care . Plan of care reviewed with pt. Opportunity given for pt.'s questions . No questions or concerns expressed at this time . VN to continue to monitor .

## 2020-11-11 NOTE — SUBJECTIVE & OBJECTIVE
Review of Systems   Constitution: Negative for chills, decreased appetite, diaphoresis and fever.   Cardiovascular: Positive for dyspnea on exertion. Negative for chest pain, claudication, cyanosis, irregular heartbeat, leg swelling, near-syncope, orthopnea, palpitations, paroxysmal nocturnal dyspnea and syncope.   Respiratory: Negative for cough, hemoptysis, shortness of breath and wheezing.    Gastrointestinal: Negative for bloating, abdominal pain, constipation, diarrhea, melena, nausea and vomiting.   Neurological: Negative for dizziness and weakness.     Objective:     Vital Signs (Most Recent):  Temp: 97.7 °F (36.5 °C) (11/11/20 0911)  Pulse: 81 (11/11/20 0911)  Resp: 20 (11/11/20 0911)  BP: 101/78 (11/11/20 0911)  SpO2: 95 % (11/11/20 0850) Vital Signs (24h Range):  Temp:  [97.2 °F (36.2 °C)-98.2 °F (36.8 °C)] 97.7 °F (36.5 °C)  Pulse:  [79-98] 81  Resp:  [16-20] 20  SpO2:  [95 %-100 %] 95 %  BP: ()/(46-78) 101/78     Weight: 49.5 kg (109 lb 2 oz)  Body mass index is 19.96 kg/m².     SpO2: 95 %  O2 Device (Oxygen Therapy): nasal cannula      Intake/Output Summary (Last 24 hours) at 11/11/2020 1015  Last data filed at 11/10/2020 2102  Gross per 24 hour   Intake --   Output 100 ml   Net -100 ml       Lines/Drains/Airways     Drain                 Hemodialysis AV Graft Left upper arm -- days          Peripheral Intravenous Line                 Peripheral IV - Single Lumen 11/09/20 1630 20 G Right Forearm 1 day                Physical Exam   Constitutional: She is oriented to person, place, and time. She appears well-developed and well-nourished. No distress.   Cardiovascular: Normal rate and regular rhythm. Exam reveals no gallop.   No murmur heard.  Pulmonary/Chest: Effort normal and breath sounds normal. No tachypnea. No respiratory distress. She has no wheezes.   Abdominal: Soft. Bowel sounds are normal. She exhibits no distension. There is no abdominal tenderness.   Neurological: She is alert and  oriented to person, place, and time.   Skin: Skin is warm and dry.       Significant Labs:     Recent Labs   Lab 11/11/20  0451   WBC 6.62   RBC 3.09*   HGB 9.1*   HCT 29.5*      MCV 96   MCH 29.4   MCHC 30.8*     Recent Labs   Lab 11/11/20  0451      K 4.5   CL 99   CO2 25   BUN 52*   CREATININE 2.7*   MG 2.0       Significant Imaging: Echocardiogram:   Transthoracic echo (TTE) complete (Cupid Only):   Results for orders placed or performed during the hospital encounter of 11/09/20   Echo Color Flow Doppler? Yes   Result Value Ref Range    BSA 1.38 m2    TDI SEPTAL 0.09 m/s    LV LATERAL E/E' RATIO 17.57 m/s    LV SEPTAL E/E' RATIO 13.67 m/s    LA WIDTH 2.87 cm    TDI LATERAL 0.07 m/s    PV PEAK VELOCITY 1.21 cm/s    LVIDd 3.54 3.5 - 6.0 cm    IVS 0.81 0.6 - 1.1 cm    Posterior Wall 0.81 0.6 - 1.1 cm    Ao root annulus 2.97 cm    LVIDs 1.86 (A) 2.1 - 4.0 cm    FS 47 28 - 44 %    LA volume 39.55 cm3    STJ 2.13 cm    Ascending aorta 2.45 cm    LV mass 78.00 g    LA size 3.79 cm    RVDD 2.47 cm    TAPSE 1.63 cm    Left Ventricle Relative Wall Thickness 0.46 cm    AV mean gradient 6 mmHg    AV valve area 2.66 cm2    AV Velocity Ratio 0.90     AV index (prosthetic) 0.93     MV valve area p 1/2 method 4.28 cm2    E/A ratio 1.60     Mean e' 0.08 m/s    E wave decelartion time 177.10 msec    Pulm vein S/D ratio 1.02     LVOT diameter 1.91 cm    LVOT area 2.9 cm2    LVOT peak landon 1.50 m/s    LVOT peak VTI 26.54 cm    Ao peak landon 1.67 m/s    Ao VTI 28.58 cm    LVOT stroke volume 76.00 cm3    AV peak gradient 11 mmHg    E/E' ratio 15.38 m/s    MV Peak E Landon 1.23 m/s    TR Max Landon 2.65 m/s    MV stenosis pressure 1/2 time 51.36 ms    MV Peak A Landon 0.77 m/s    PV Peak S Landon 0.63 m/s    PV Peak D Landon 0.62 m/s    LV Systolic Volume 10.50 mL    LV Systolic Volume Index 7.5 mL/m2    LV Diastolic Volume 52.17 mL    LV Diastolic Volume Index 37.28 mL/m2    LA Volume Index 28.3 mL/m2    LV Mass Index 56 g/m2    RA Major  Axis 4.00 cm    Left Atrium Minor Axis 3.96 cm    Left Atrium Major Axis 4.65 cm    Triscuspid Valve Regurgitation Peak Gradient 28 mmHg    RA Width 2.79 cm    Right Atrial Pressure (from IVC) 3 mmHg    TV rest pulmonary artery pressure 31 mmHg    Narrative    · The left ventricle is normal in size with hyperdynamic systolic   function. The estimated ejection fraction is 70-75%.  · There is left ventricular concentric remodeling.  · Indeterminate diastolic function.  · The estimated PA systolic pressure is 31 mmHg.  · Mild right ventricular enlargement with normal right ventricular   systolic function.  · Normal central venous pressure (3 mmHg).

## 2020-11-11 NOTE — CONSULTS
Consult Note  [facility]    Attending: Markie  Fellow: Norberto  Resident: Patricia  Admit Date: 11/9/2020  Today's Date: 11/11/2020      SUBJECTIVE:     CC: Shortness of breath x 2-3 days    HPI: Mrs. Quevedo is a 75 y.o. F w/ hx of CHF, ESRD on dialysis MWF, COPD and recurrent pleural effusion who presented 2/2 to complaints of SOB x2 days. Presented to dialysis on the day of admission but could not complete her treatment due to worsening SOB. Endorses wearing 2L NC at home and had been sleeping on x3 pillows prior to admit to improve breathing. Denied any fevers, chills, sick contacts or N/V. Patient takes lasix daily at home and endorsed compliance with both her medications and dialysis appointments. Patient was subsequently admitted to the hospital due to this SOB. This pulmonology service is now consulted for assistance in evaluation of her pleural effusion.    Interval History: Since being admitted to the hospital patient endorses improvement of SOB. Continues to deny any fevers or chills. Denies any chest pain or other complaints. On evaluation patient was receiving dialysis. Stated that she has had a pleural effusion for some time and notes having it drained x4 times. Patient notes that she follows w/ Dr. Rust of Pulmonology.    OBJECTIVE:     Vital Signs Trends/Hx Reviewed  Vitals:    11/11/20 0607 11/11/20 0800 11/11/20 0817 11/11/20 0850   BP: (!) 128/53  (!) 112/56    BP Location:   Right arm    Patient Position: Lying  Sitting    Pulse: 92 98 91 93   Resp: 18  18 17   Temp: 98.2 °F (36.8 °C)  98.2 °F (36.8 °C)    TempSrc: Oral  Oral    SpO2:    95%   Weight: 49.5 kg (109 lb 2 oz)      Height:         Physical Exam:  General: NAD, cooperative & interactive. Anxious. Thin.  HEENT: AT/NC, PERRL, EOMI, oral and nasal mucosa moist.   Neck: Supple without JVD or palpable LAD.   Cardiac: normal rate, regular rhythm, with no MRG with brisk cap refill and symmetric pulses in distal  extremities. Fistula murmur heard.  Respiratory: On 3L NC, Decreased breath sounds in left lower lobe w/ rales noted throughout, Dull to percussion over left lower lobe  Abdomen: Soft, NT/ND. +BS. No hepatosplenomegaly.   Extremities: No edema.   Neuro: Moving all extremities w/ intent. Oriented x3.     Laboratory:  Recent Labs   Lab 11/11/20  0451   WBC 6.62   RBC 3.09*   HGB 9.1*   HCT 29.5*      MCV 96   MCH 29.4   MCHC 30.8*     Recent Labs   Lab 11/11/20  0451      K 4.5   CL 99   CO2 25   BUN 52*   CREATININE 2.7*   MG 2.0     Microbiology Data:   Microbiology Results (last 7 days)     ** No results found for the last 168 hours. **         Radiology:   CT Chest 11/10:  Pleural and parenchymal opacities in the left lung base suggesting pneumonia and parapneumonic effusion.  Empyema is difficult to exclude given the thickness of the adjacent parietal pleura.  No air bubbles are seen.  Small pericardial effusion without tamponade.  Probable renal cysts.  Mild scattered emphysema and bronchiectasis.  This report was flagged in Epic as abnormal.    Scheduled Medications:    sodium chloride 0.9%   Intravenous Once    allopurinoL  100 mg Oral Every Tues, Thurs, Sat, Sun    cyproheptadine  4 mg Oral Q8H    enoxaparin  30 mg Subcutaneous Q24H    escitalopram oxalate  10 mg Oral Daily    mupirocin   Nasal BID     PRN Medications:   sodium chloride 0.9%, acetaminophen, albuterol-ipratropium, clonazePAM, ondansetron, ondansetron, sodium chloride 0.9%    Problem List:   Patient Active Problem List   Diagnosis    Melanoma    Chronic pain    Vitamin deficiency    Cervical post-laminectomy syndrome    Lumbar postlaminectomy syndrome    Lumbosacral spondylosis without myelopathy    Isolated cervical dystonia    Primary osteoarthritis of both knees    Subdeltoid bursitis, L>R.    Other fragments of torsion dystonia    Nuclear sclerosis - Both Eyes    Rotator cuff tear, right    Biceps tendonitis     Osteoarthritis, hip, bilateral    Lumbar radiculopathy, BLE    Cervical radiculopathy, BUE    Osteoporosis    Iron deficiency anemia    Essential hypertension    Atrophy of left kidney    Kidney cysts    History of colon polyps    Pleural effusion, left    Pericardial effusion    Shortness of breath    Chronic respiratory failure with hypoxia, on home O2 therapy    Anemia, chronic renal failure, stage 4 (severe)    Lipoma of torso    Chronic diastolic heart failure    COPD (chronic obstructive pulmonary disease)    Non-intractable vomiting with nausea    Diastolic dysfunction, left ventricle    Acute congestive heart failure    ESRD (end stage renal disease) on dialysis    Diarrhea    Dialysis AV fistula malfunction, sequela    Medication management    SOB (shortness of breath)    Acute on chronic heart failure    Pleural effusion    Orthopnea     ASSESSMENT & RECOMMENDATIONS     Recurrent Pleural Effusion  - Patient with known history of recurrent left sided pleural effusion; As per patient x4 thoracentesis in the past but on chart review noted to have 2 in our system   - Cytology negative x2; Transudative w/ elevated WBC lymphocytic predominance  - History of responding to diuresis; Decreased concern for malignancy in this setting  - Patient went with IR on 11/10 who noted that the volume was too small to perform thoracentesis at that time  - Known history of diastolic heart failure with TTE this admit showing left ventricular concentric remodeling with an EF of 70-75% and indeterminate diastolic function which could be a contributing force to this effusion  - CT showing thickening of parietal pleura and concern for trapping of the patients lung  - Given loculations original etiology of pleural effusion may have been irritation 2/2 to uremia from dialysis or other inflammatory state   - Would have patient follow up outpatient for further evaluation and potential pleural biopsy if  amenable  - Patient has remained afebrile throughout admission w/ no signs of infections; Although can not rule out, infectious etiology of SOB is less likely and is instead 2/2 to volume status  - No indication for acute intervention at this time; Would continue w/ HD sessions to ensure patient achieves euvolemic state    Thank you for allowing us to participate in the care of this patient. We will sign off at this time. Please call with any further questions.    Barrington Gomez IV, MD  [facility]II  9:47 AM 11/11/2020

## 2020-11-11 NOTE — ASSESSMENT & PLAN NOTE
- SBP 90s-120s overnight  - on low dose Norvasc at home but on hold currently; agree with continued holding  - may resume closer to discharge depending on BP

## 2020-11-11 NOTE — ASSESSMENT & PLAN NOTE
- on HD M-W-F; full treatment on Monday as an outpatient; plan for HD today; appears at baseline dry weight per patient  - Nephrology on board

## 2020-11-11 NOTE — ASSESSMENT & PLAN NOTE
- echo in June 2020 with normal LVEF and indeterminate diastolic function; repeat echo with EF 70-75% and indeterminate diastolic function; no LVOT gradient present  - ; CXR reviewed; treated with IV Lasix with only 100cc documented out overnight; negative 1.2 liters since admission; creatinine up to 2.7 from 1.7 yesterday-will hold IV Lasix BID  - feel her SOB could more COPD/pulmonary related at this time; nothing to suggest continued acute volume overload   - followed by Pulmonary as an outpatient with no recent visit per patient; requesting to see Pulmonary as an inpatient but will defer to primary team

## 2020-11-11 NOTE — PLAN OF CARE
Patient A&Ox4. Came back from dialysis. Vitals taken. On Oxygen at 3L/NC saturating 99%. All due meds given. Up to commode with assist. Bed alarm on. Call light within reach.

## 2020-11-12 ENCOUNTER — TELEPHONE (OUTPATIENT)
Dept: PULMONOLOGY | Facility: CLINIC | Age: 75
End: 2020-11-12

## 2020-11-12 VITALS
SYSTOLIC BLOOD PRESSURE: 106 MMHG | HEIGHT: 62 IN | BODY MASS INDEX: 18.7 KG/M2 | TEMPERATURE: 98 F | DIASTOLIC BLOOD PRESSURE: 51 MMHG | RESPIRATION RATE: 17 BRPM | HEART RATE: 80 BPM | OXYGEN SATURATION: 96 % | WEIGHT: 101.63 LBS

## 2020-11-12 LAB
ANION GAP SERPL CALC-SCNC: 10 MMOL/L (ref 8–16)
BASOPHILS # BLD AUTO: 0.1 K/UL (ref 0–0.2)
BASOPHILS NFR BLD: 1.4 % (ref 0–1.9)
BUN SERPL-MCNC: 34 MG/DL (ref 8–23)
CALCIUM SERPL-MCNC: 9.3 MG/DL (ref 8.7–10.5)
CHLORIDE SERPL-SCNC: 102 MMOL/L (ref 95–110)
CO2 SERPL-SCNC: 22 MMOL/L (ref 23–29)
CREAT SERPL-MCNC: 2.2 MG/DL (ref 0.5–1.4)
DIFFERENTIAL METHOD: ABNORMAL
EOSINOPHIL # BLD AUTO: 0.4 K/UL (ref 0–0.5)
EOSINOPHIL NFR BLD: 5.6 % (ref 0–8)
ERYTHROCYTE [DISTWIDTH] IN BLOOD BY AUTOMATED COUNT: 15.1 % (ref 11.5–14.5)
EST. GFR  (AFRICAN AMERICAN): 24 ML/MIN/1.73 M^2
EST. GFR  (NON AFRICAN AMERICAN): 21 ML/MIN/1.73 M^2
GLUCOSE SERPL-MCNC: 76 MG/DL (ref 70–110)
HBV CORE AB SERPL QL IA: NEGATIVE
HBV SURFACE AG SERPL QL IA: NEGATIVE
HCT VFR BLD AUTO: 34.2 % (ref 37–48.5)
HGB BLD-MCNC: 10.6 G/DL (ref 12–16)
IMM GRANULOCYTES # BLD AUTO: 0.02 K/UL (ref 0–0.04)
IMM GRANULOCYTES NFR BLD AUTO: 0.3 % (ref 0–0.5)
LYMPHOCYTES # BLD AUTO: 1.9 K/UL (ref 1–4.8)
LYMPHOCYTES NFR BLD: 27.4 % (ref 18–48)
MAGNESIUM SERPL-MCNC: 1.9 MG/DL (ref 1.6–2.6)
MCH RBC QN AUTO: 29.5 PG (ref 27–31)
MCHC RBC AUTO-ENTMCNC: 31 G/DL (ref 32–36)
MCV RBC AUTO: 95 FL (ref 82–98)
MONOCYTES # BLD AUTO: 0.9 K/UL (ref 0.3–1)
MONOCYTES NFR BLD: 13.4 % (ref 4–15)
NEUTROPHILS # BLD AUTO: 3.7 K/UL (ref 1.8–7.7)
NEUTROPHILS NFR BLD: 51.9 % (ref 38–73)
NRBC BLD-RTO: 0 /100 WBC
PLATELET # BLD AUTO: 253 K/UL (ref 150–350)
PMV BLD AUTO: 10.5 FL (ref 9.2–12.9)
POTASSIUM SERPL-SCNC: 4.4 MMOL/L (ref 3.5–5.1)
RBC # BLD AUTO: 3.59 M/UL (ref 4–5.4)
SODIUM SERPL-SCNC: 134 MMOL/L (ref 136–145)
WBC # BLD AUTO: 7.02 K/UL (ref 3.9–12.7)

## 2020-11-12 PROCEDURE — 25000003 PHARM REV CODE 250: Mod: HCNC | Performed by: NURSE PRACTITIONER

## 2020-11-12 PROCEDURE — 85025 COMPLETE CBC W/AUTO DIFF WBC: CPT | Mod: HCNC

## 2020-11-12 PROCEDURE — 27000221 HC OXYGEN, UP TO 24 HOURS: Mod: HCNC

## 2020-11-12 PROCEDURE — G0378 HOSPITAL OBSERVATION PER HR: HCPCS | Mod: HCNC

## 2020-11-12 PROCEDURE — 80048 BASIC METABOLIC PNL TOTAL CA: CPT | Mod: HCNC

## 2020-11-12 PROCEDURE — 36415 COLL VENOUS BLD VENIPUNCTURE: CPT | Mod: HCNC

## 2020-11-12 PROCEDURE — 99900035 HC TECH TIME PER 15 MIN (STAT): Mod: HCNC

## 2020-11-12 PROCEDURE — 94761 N-INVAS EAR/PLS OXIMETRY MLT: CPT | Mod: HCNC

## 2020-11-12 PROCEDURE — 83735 ASSAY OF MAGNESIUM: CPT | Mod: HCNC

## 2020-11-12 RX ORDER — POTASSIUM CHLORIDE 20 MEQ/1
20 TABLET, EXTENDED RELEASE ORAL
Qty: 20 TABLET | Refills: 0 | Status: SHIPPED | OUTPATIENT
Start: 2020-11-12 | End: 2022-02-08

## 2020-11-12 RX ORDER — FOLIC ACID/VIT B COMPLEX AND C 0.8 MG
1 TABLET ORAL DAILY
Qty: 30 TABLET | Refills: 11
Start: 2020-11-12 | End: 2022-04-15

## 2020-11-12 RX ORDER — MUPIROCIN 20 MG/G
OINTMENT TOPICAL 2 TIMES DAILY
Qty: 22 G | Refills: 0 | Status: SHIPPED | OUTPATIENT
Start: 2020-11-12 | End: 2022-02-08

## 2020-11-12 RX ADMIN — MUPIROCIN: 20 OINTMENT TOPICAL at 08:11

## 2020-11-12 RX ADMIN — CYPROHEPTADINE HYDROCHLORIDE 4 MG: 4 TABLET ORAL at 05:11

## 2020-11-12 RX ADMIN — ESCITALOPRAM OXALATE 10 MG: 10 TABLET ORAL at 08:11

## 2020-11-12 NOTE — ASSESSMENT & PLAN NOTE
Dialysis MWF  Electrolytes WNL  Nephrology following, HD 11/11--3L removed  Still makes urine  Dc home-cont outpatient dialysis

## 2020-11-12 NOTE — DISCHARGE INSTRUCTIONS
The medical team here wants your primary home doctor to determine when or if the amlodipine is to be restarted  And to make any adjustments to the amount or frequency if needed.   Your blood pressure was low during this hospitalization and has discontinued it for now .

## 2020-11-12 NOTE — PLAN OF CARE
Discharge order noted, No HH or DME noted. Pt's  has pt home O2 at bedside.    Discharge rounds on patient. Discussed followup appointments, blue discharge folder, discharge nurse will go over home medications and reasons for medications and final discharge instructions. All patient/caregiver questions answered. Patient verbalized understanding.    Future Appointments   Date Time Provider Department Center   11/24/2020  2:20 PM Pedro Ndiaye MD [facility] CARDIO [facility]   12/1/2020  2:00 PM Clemencia Gambino MD [facility] IMPRI [facility]   1/19/2021  2:40 PM Pushpa Mcmahon MD [facility]        11/12/20 1138   Final Note   Assessment Type Final Discharge Note   Anticipated Discharge Disposition Home   What phone number can be called within the next 1-3 days to see how you are doing after discharge? [phone]   Hospital Follow Up  Appt(s) scheduled? Yes   Discharge plans and expectations educations in teach back method with documentation complete? Yes   Right Care Referral Info   Post Acute Recommendation No Care

## 2020-11-12 NOTE — ASSESSMENT & PLAN NOTE
Left side is chronic  New to the right side  Consulted IR: Consolidated LLL with a trace loculated left pleural effusion. Volume too small to warrant thoracentesis.   Pulmonology consulted  CT Chest: Pleural and parenchymal opacities in the left lung base suggesting pneumonia and parapneumonic effusion.  Empyema is difficult to exclude given the thickness of the adjacent parietal pleura.     Plan to discharge home and f/u with pulm outpatient for possible biopsy

## 2020-11-12 NOTE — ASSESSMENT & PLAN NOTE
SOB (shortness of breath)  Orthopnea    Echo done-no major change from prior   Cont supplemental O2  This presentation may be more due to end stage COPD than CHF  Stop diuresing  Plan to DC home tomorrow as she is back to her baseline        Intake/Output Summary (Last 24 hours) at 11/11/2020 1927  Last data filed at 11/11/2020 1315  Gross per 24 hour   Intake 400 ml   Output 2500 ml   Net -2100 ml

## 2020-11-12 NOTE — DISCHARGE SUMMARY
[facility] Hospital Medicine  Discharge Summary      Patient Name: Katie Quevedo  MRN: [id number]  Admission Date: 11/9/2020  Hospital Length of Stay: 0 days  Discharge Date and Time:  11/12/2020 1:47 PM  Attending Physician: No att. providers found   Discharging Provider: Rachel Alexander NP  Primary Care Provider: Kingston Verduzco MD      HPI:   Ms. Quevedo is a 74 yo female with a pertinent history of CHF, ESRD on dialysis MWF, COPD who presented to the ED with increasing SOB x 2 days. She went to dialysis today but could not complete the treatment because she was so short of breath. She does not know how much fluid was taken off in dialysis today. She wears 2LNC at home and has been sleeping on 3 pillows to improve breathing. She denies chest pain, fevers, chills. She takes lasix daily and is compliant with her medications.     On chest Xray, she was found to have a new small right pleural effusion and a chronic moderate effusion on left side, possibly loculated. Bilateral rales on exam. No edema to BLE. Her BNP is 364. Negative troponin. She is extremely short of breath on exertion. She was started on IV lasix in the ED. She still makes urine.       * No surgery found *      Hospital Course:   Pt placed in observation for evaluation of SOB. Cardiology was consulted. The patient was diuresed with lasix. IR consulted for thoracentesis, consolidated LLL with a trace loculated left pleural effusion. Volume too small to warrant thoracentesis.  Pulmonology consulted, formal recs to follow but recommended CT chest without contrast. Will follow up outpatient. Nephrology consulted. The patient received dialysis while here and tolerated well with 3L removed on 10/11. After diuresing and a full dialysis session, the patient felt she was back to her baseline. This admission was likely a result of combination of factors with COPD, ESRD, and CHF. The patient wears home oxygen and is normally dyspneic on  exertion. She has been afebrile and her lung sounds have improved while admitted. The patient was discharged home on 11/12/20 and instructed to stop amlodipine until she follows up with PCP. Her blood pressures have been low while here. She was instructed on follow up appts with pulmonology and PCP, and to continue her dialysis sessions as scheduled.      Consults:   Consults (From admission, onward)        Status Ordering Provider     Inpatient consult to [facility]iology[facility]  Once     Provider:  Lisandra Art MD    Completed [facility]     Inpatient consult to Interventional Radiology  Once     Provider:  (Not yet assigned)    Completed [facility]     Inpatient consult to Nephrology-[facility] (Caterina Bustillos, Paula)  Once     Provider:  Adeline Osuna MD    Acknowledged [facility]     Inpatient consult to Pulmonology  Once     Provider:  Boby Aden MD    Completed ADELINE OSUNA     IP consult to case management  Once     Provider:  (Not yet assigned)    Acknowledged IN[facility]OCENT-[facility] RICHARDSON.          * Acute on chronic heart failure  SOB (shortness of breath)  Orthopnea    Echo done-no major change from prior   Cont supplemental O2  This presentation seems a combination of end stage COPD, CHF, and ESRD  She was diuresed and had a full session of dialysis and feels back to her baseline  Plan to DC home today        Intake/Output Summary (Last 24 hours) at 11/12/2020 0937  Last data filed at 11/12/2020 0508  Gross per 24 hour   Intake 400 ml   Output 2600 ml   Net -2200 ml         Pleural effusion  Left side is chronic  New to the right side  Consulted IR: Consolidated LLL with a trace loculated left pleural effusion. Volume too small to warrant thoracentesis.   Pulmonology consulted  CT Chest: Pleural and parenchymal opacities in the left lung base suggesting pneumonia and parapneumonic effusion.  Empyema is difficult to exclude given the thickness of  the adjacent parietal pleura.     Plan to discharge home and f/u with pulm outpatient for possible biopsy        ESRD (end stage renal disease) on dialysis    Dialysis MWF  Electrolytes WNL  Nephrology following, HD 11/11--3L removed  Still makes urine  Dc home-cont outpatient dialysis     COPD (chronic obstructive pulmonary disease)  Patient uses trelegy-elllipta--cont home med at discharge  Cont duonebs  This presentation may be more likely a combination of COPD and CHF, in addition to ESRD  She feels back to her baseline respiratory status-  will discharge home today and follow up with pulm as outpatient      Chronic respiratory failure with hypoxia, on home O2 therapy   Cont 2-3L NC to maintain sats  Patient states she wears 2-4L at home  Dc home, cont home O2  Follow up with pulmonology outpatient as scheduled.   Cont trelegy ellipta and duonebs at home          Essential hypertension  On amlodipine at home  Hold for SBP 90s  Will discontinue amlodipine on discharge as blood pressures have remained low.   Follow up with PCP and can reassess at that time.         Final Active Diagnoses:    Diagnosis Date Noted POA    PRINCIPAL PROBLEM:  Acute on chronic heart failure [I50.9] 11/09/2020 Yes    Orthopnea [R06.01] 11/10/2020 Yes    SOB (shortness of breath) [R06.02] 11/09/2020 Yes    Pleural effusion [J90] 11/09/2020 Yes    ESRD (end stage renal disease) on dialysis [N18.6, Z99.2] 02/19/2020 Not Applicable     Chronic    COPD (chronic obstructive pulmonary disease) [J44.9] 12/18/2019 Yes     Chronic    Chronic respiratory failure with hypoxia, on home O2 therapy [J96.11, Z99.81] 07/18/2019 Not Applicable     Chronic    Essential hypertension [I10] 01/22/2018 Yes     Chronic      Problems Resolved During this Admission:       Discharged Condition: stable    Disposition: Home or Self Care    Follow Up:  Follow-up Information     Clemencia Gambino MD. Go on 12/1/2020.    Specialty: Hospitalist  Why: Time:  2:00pm [facility] Follow up  Contact information:  [address]  [address]  [place] [address]  [phone]             Giancarlo Rust MD. Go in 1 week.    Specialty: Pulmonary Disease  Why: Pulmonology-'s office staff will contact you to schedule a follow up appointment   Contact information:  [address]  [address]  [facility] [address]  [phone]                 Patient Instructions:      Diet Cardiac     Diet renal     Notify your health care provider if you experience any of the following:  temperature >100.4     Notify your health care provider if you experience any of the following:  persistent nausea and vomiting or diarrhea     Notify your health care provider if you experience any of the following:  severe uncontrolled pain     Notify your health care provider if you experience any of the following:  difficulty breathing or increased cough     Notify your health care provider if you experience any of the following:  severe persistent headache     Notify your health care provider if you experience any of the following:  worsening rash     Notify your health care provider if you experience any of the following:  persistent dizziness, light-headedness, or visual disturbances     Notify your health care provider if you experience any of the following:  increased confusion or weakness     Activity as tolerated       Significant Diagnostic Studies:     Pending Diagnostic Studies:     Procedure Component Value Units Date/Time    Hepatitis B Surface Antibody, Qual/Quant [[id number]] Collected: 11/11/20 0853    Order Status: Sent Lab Status: In process Updated: 11/11/20 1237    Specimen: Blood          Medications:  Reconciled Home Medications:      Medication List      CHANGE how you take these medications    furosemide 80 MG tablet  Commonly known as: LASIX  Take 1 tablet (80 mg total) by mouth twice daily on non dialysis days Tuesday, Thursday, Saturday, and Sunday.  What  changed: Another medication with the same name was removed. Continue taking this medication, and follow the directions you see here.     potassium chloride SA 20 MEQ tablet  Commonly known as: K-DUR,KLOR-CON  Take 1 tablet (20 mEq total) by mouth every Tuesday, Thursday, Saturday, Sunday.  What changed:   · how much to take  · when to take this        CONTINUE taking these medications    albuterol-ipratropium 2.5 mg-0.5 mg/3 mL nebulizer solution  Commonly known as: DUO-NEB  Take 3 mLs by nebulization every 6 (six) hours as needed for Shortness of Breath. Rescue     allopurinoL 100 MG tablet  Commonly known as: ZYLOPRIM  Take 1 tablet (100 mg total) by mouth on Tuesday, Thursday, Saturday, and Sunday.     clonazePAM 1 MG tablet  Commonly known as: KLONOPIN  TAKE 1 TABLET (1 MG TOTAL) BY MOUTH DAILY AS NEEDED (ON DIALYSIS DAYS FOR ANXIETY.).     diphenhydrAMINE 25 mg capsule  Commonly known as: BENADRYL  Take 25 mg by mouth every 6 (six) hours as needed for Itching.     epoetin hemant-epbx 10,000 unit/mL imjection  Commonly known as: RETACRIT  Inject 0.5 mLs (5,000 Units total) into the skin every Mon, Wed, Fri.     escitalopram oxalate 10 MG tablet  Commonly known as: LEXAPRO  Take 1 tablet (10 mg total) by mouth once daily.     HYDROcodone-acetaminophen  mg per tablet  Commonly known as: NORCO  Take 1 tablet by mouth 3 (three) times daily as needed.     lidocaine-prilocaine cream  Commonly known as: EMLA  Apply topically to left arm prior to dialysis.     megestroL 40 MG Tab  Commonly known as: MEGACE  take  1 tablet by mouth every day     mupirocin 2 % ointment  Commonly known as: BACTROBAN  apply by Nasal route 2 (two) times daily.     ondansetron 4 MG Tbdl  Commonly known as: ZOFRAN-ODT  Dissolve one tablet under the tongue every 6 (six) hours as needed.     LINDA-RADHA 0.8 mg Tab  Generic drug: B complex-vitamin C-folic acid  Take 1 tablet (0.8 mg total) by mouth once daily.     sodium bicarbonate 650 MG  tablet  Take 1 tablet (650 mg total) by mouth 2 (two) times daily.     traZODone 100 MG tablet  Commonly known as: DESYREL  Take 100 mg by mouth nightly as needed for Insomnia.     TRELEGY ELLIPTA 100-62.5-25 mcg Dsdv  Generic drug: fluticasone-umeclidin-vilanter  Inhale 1 puff by mouth into the lungs once daily.     VITAMIN D2 50,000 unit Cap  Generic drug: ergocalciferol  Take 1 capsule by mouth once weekly for 10 weeks, then take 1 capsule by mouth once monthly.        STOP taking these medications    amLODIPine 2.5 MG tablet  Commonly known as: NORVASC     cyproheptadine 4 mg tablet  Commonly known as: PERIACTIN     levoFLOXacin 750 MG tablet  Commonly known as: LEVAQUIN     predniSONE 10 MG tablet  Commonly known as: DELTASONE            Indwelling Lines/Drains at time of discharge:   Lines/Drains/Airways     Drain                 Hemodialysis AV Graft Left upper arm -- days                Time spent on the discharge of patient: 45 minutes  Patient was seen and examined on the date of discharge and determined to be suitable for discharge.         Rachel Alexander NP  Department of Hospital Medicine  [facility]

## 2020-11-12 NOTE — ASSESSMENT & PLAN NOTE
Cont 2-3L NC to maintain sats  Patient states she wears 2-4L at home  Dc home, cont home O2  Follow up with pulmonology outpatient as scheduled.   Cont trelegy ellipta and duonebs at home

## 2020-11-12 NOTE — ASSESSMENT & PLAN NOTE
Patient uses trelegy-elllipta--cont home med at discharge  Cont duonebs  This presentation may be more likely a combination of COPD and CHF, in addition to ESRD  She feels back to her baseline respiratory status-  will discharge home today and follow up with pulm as outpatient

## 2020-11-12 NOTE — TELEPHONE ENCOUNTER
Left message on patient voicemail, informing her that I have received her message. I advised patient that if she wants to schedule appointment or if she has any questions or concerns, she may contact the office. Office number has been provided.

## 2020-11-12 NOTE — ASSESSMENT & PLAN NOTE
Dialysis MWF  Electrolytes WNL  Nephrology following, HD 11/11--3L removed  Still makes urine  Creatinine rising

## 2020-11-12 NOTE — ASSESSMENT & PLAN NOTE
Patient uses trelegy-elllipta at home--we do not have that here  Start duo nebs PRN   This presentation may be more likely related to COPD than CHF  She feels back to her baseline respiratory status-  Plan to discharge home tomorrow and follow up with pulm as outpatient

## 2020-11-12 NOTE — SUBJECTIVE & OBJECTIVE
Interval History:  no acute events overnight. Patient went to dialysis today, 3L removed. Says she is always short of breath at home and she feels like she is back to her baseline    Review of Systems   Constitutional: Positive for activity change. Negative for chills and fever.   HENT: Negative for congestion, sore throat and trouble swallowing.    Eyes: Negative for pain and visual disturbance.   Respiratory: Positive for shortness of breath. Negative for cough.    Cardiovascular: Negative for chest pain and leg swelling.   Gastrointestinal: Negative for abdominal pain, diarrhea, nausea and vomiting.   Genitourinary: Negative for difficulty urinating and dysuria.   Musculoskeletal: Positive for arthralgias.   Skin: Negative for rash and wound.   Neurological: Positive for weakness. Negative for dizziness, syncope and headaches.   Psychiatric/Behavioral: Negative for confusion.     Objective:     Vital Signs (Most Recent):  Temp: 98 °F (36.7 °C) (11/11/20 1649)  Pulse: 78 (11/11/20 1649)  Resp: 16 (11/11/20 1649)  BP: (!) 92/58 (11/11/20 1725)  SpO2: 99 % (11/11/20 1430) Vital Signs (24h Range):  Temp:  [97.5 °F (36.4 °C)-98.3 °F (36.8 °C)] 98 °F (36.7 °C)  Pulse:  [] 78  Resp:  [16-20] 16  SpO2:  [95 %-99 %] 99 %  BP: ()/(44-98) 92/58     Weight: 49.5 kg (109 lb 2 oz)  Body mass index is 19.96 kg/m².    Intake/Output Summary (Last 24 hours) at 11/11/2020 1915  Last data filed at 11/11/2020 1315  Gross per 24 hour   Intake 400 ml   Output 2500 ml   Net -2100 ml      Physical Exam  Vitals signs and nursing note reviewed.   Constitutional:       Appearance: She is cachectic.   HENT:      Head: Normocephalic and atraumatic.      Mouth/Throat:      Mouth: Mucous membranes are moist.   Eyes:      Conjunctiva/sclera: Conjunctivae normal.   Neck:      Musculoskeletal: Neck supple.   Cardiovascular:      Rate and Rhythm: Normal rate and regular rhythm.      Pulses: Normal pulses.   Pulmonary:      Effort:  Respiratory distress present.      Breath sounds: Decreased breath sounds and rales present.      Comments: Diminished lung sounds to left side  Abdominal:      General: Bowel sounds are normal.      Palpations: Abdomen is soft.   Musculoskeletal: Normal range of motion.         General: No swelling.   Skin:     General: Skin is warm and dry.      Coloration: Skin is pale.   Neurological:      Mental Status: She is alert and oriented to person, place, and time.      Motor: Weakness present.   Psychiatric:         Mood and Affect: Mood normal.         Behavior: Behavior normal.         Thought Content: Thought content normal.         Significant Labs: All pertinent labs within the past 24 hours have been reviewed.    Significant Imaging: I have reviewed all pertinent imaging results/findings within the past 24 hours.

## 2020-11-12 NOTE — PLAN OF CARE
Patient has received discharge instructions. Prescriptions received. Instructions reviewed with pt using teachback method. All questions answered to pt satisfaction. IV access and telemetry removed per floor nurse. Transport requested for discharge.

## 2020-11-12 NOTE — PROGRESS NOTES
Nephrology Progress note      Consult Requested By: Rosa Del Toro MD  Reason for Consult: ESRD    SUBJECTIVE:     History of Present Illness:  Patient is a 75 y.o. female presents with SOB. ESRD on HD with recurrent pleural effusion      Review of Systems   Constitutional: Negative for chills and fever.   Respiratory: Positive for shortness of breath. Negative for cough.    Cardiovascular: Negative for chest pain and leg swelling.   Gastrointestinal: Negative for nausea.        OBJECTIVE:     Vital Signs (Most Recent)  Vitals:    11/12/20 0726 11/12/20 0727 11/12/20 0752 11/12/20 0755   BP: (!) 106/51 (!) 106/51     BP Location:  Right arm     Patient Position:  Lying     Pulse: 80 80 86 80   Resp: 18 18  17   Temp: 97.8 °F (36.6 °C) 97.8 °F (36.6 °C)     TempSrc: Oral Oral     SpO2: 98% 98%  96%   Weight:       Height:             Date 11/12/20 0700 - 11/13/20 0659   Shift 7741-3310 9620-2414 0532-1355 24 Hour Total   INTAKE   Shift Total(mL/kg)       OUTPUT   Urine(mL/kg/hr) 150   150   Shift Total(mL/kg) 150(3.3)   150(3.3)   Weight (kg) 46.1 46.1 46.1 46.1           Medications:   allopurinoL  100 mg Oral Every Tues, Thurs, Sat, Sun    cyproheptadine  4 mg Oral Q8H    enoxaparin  30 mg Subcutaneous Q24H    escitalopram oxalate  10 mg Oral Daily    mupirocin   Nasal BID           Physical Exam   Constitutional: She is oriented to person, place, and time and well-developed, well-nourished, and in no distress. No distress.   HENT:   Head: Normocephalic and atraumatic.   Mouth/Throat: Oropharynx is clear and moist.   Eyes: EOM are normal. No scleral icterus.   Neck: Neck supple. No JVD present.   Cardiovascular: Normal rate and regular rhythm. Exam reveals no friction rub.   No murmur heard.  Pulmonary/Chest: Effort normal and breath sounds normal. No respiratory distress. She has no wheezes. She has no rales.   Abdominal: Soft. Bowel sounds are normal. She exhibits no distension. There is no abdominal  tenderness.   Musculoskeletal:         General: No edema.   Neurological: She is alert and oriented to person, place, and time.   Skin: Skin is warm and dry. No rash noted. She is not diaphoretic. No erythema.   Psychiatric: Affect normal.       Laboratory:  Recent Labs   Lab 11/10/20  0502 11/11/20  0451 11/12/20  0607   WBC 6.50 6.62 7.02   HGB 10.0* 9.1* 10.6*   HCT 32.2* 29.5* 34.2*    231 253   MONO 11.8  0.8 12.2  0.8 13.4  0.9     Recent Labs   Lab 11/10/20  0502 11/11/20  0451 11/12/20  0607    136 134*   K 4.3 4.5 4.4    99 102   CO2 26 25 22*   BUN 34* 52* 34*   CREATININE 1.7* 2.7* 2.2*   CALCIUM 9.6 8.5* 9.3       Diagnostic Results:  X-Ray: Reviewed  US: Reviewed  Echo: Reviewed  ASSESSMENT/PLAN:     1. ESRD (N18.6 Z99.2) - usual HD on MWF with Dr. Aura Diggs   Pt does not feel like her SOB is back to normal but its better then it was yesterday. I offered her additional HD today to try to remove more fluids, but she does not agree, states she wants to go home and will go to [facility] tomorrow    2. HTN -\UF as tolerated on dialysis  3. 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 Anemia of CKD -   At goal, no epo today  Recent Labs   Lab 11/10/20  0502 11/11/20  0451 11/12/20  0607   WBC 6.50 6.62 7.02   HGB 10.0* 9.1* 10.6*   HCT 32.2* 29.5* 34.2*    231 253     Lab Results   Component Value Date    IRON 13 (L) 02/12/2020    TIBC 462 (H) 02/12/2020    FERRITIN 148 07/17/2019             5. Access -AVF follows with Dr. Louie  6. Nutrition - renal diet, nepro, renal vitamins        Thank you for consult, will follow  With  any question please call [phone]  Yadi Mitchell    [facility]  EDGARD Bustillos MD, NEIL SAMS MD,   MD JAC Benoit, EFREN  [address]  [place], [address]  [phone]

## 2020-11-12 NOTE — ASSESSMENT & PLAN NOTE
SOB (shortness of breath)  Orthopnea    Echo done-no major change from prior   Cont supplemental O2  This presentation seems a combination of end stage COPD, CHF, and ESRD  She was diuresed and had a full session of dialysis and feels back to her baseline  Plan to DC home today        Intake/Output Summary (Last 24 hours) at 11/12/2020 0903  Last data filed at 11/12/2020 0508  Gross per 24 hour   Intake 400 ml   Output 2600 ml   Net -2200 ml

## 2020-11-12 NOTE — ASSESSMENT & PLAN NOTE
Left side is chronic  New to the right side  Consulted IR: Consolidated LLL with a trace loculated left pleural effusion. Volume too small to warrant thoracentesis.   Pulmonology consulted  CT Chest: Pleural and parenchymal opacities in the left lung base suggesting pneumonia and parapneumonic effusion.  Empyema is difficult to exclude given the thickness of the adjacent parietal pleura.     Plan to discharge home tomorrow and f/u with pulm outpatient for possible biopsy

## 2020-11-12 NOTE — PLAN OF CARE
"Pt is AAOx4. Pt had 3L taken off during dialysis during day shift. Pt has no c/o of discomfort at this time. Asked if SOB was better or worse than it was previous shift. Pt replied that it was not better or worse, but the same. Pt appeared to be fatigued. Asked if pt normally feels this fatigued after dialysis. Pt replied that she does, "it is draining." Pt BP running low, monitoring manually to ensure accuracy. Pt appears to be sleeping between rounds. Pt comfort and safety maintained. Will continue to monitor.   "

## 2020-11-12 NOTE — ASSESSMENT & PLAN NOTE
On amlodipine at home  Hold for SBP 90s  Will discontinue amlodipine on discharge as blood pressures have remained low.   Follow up with PCP and can reassess at that time.

## 2020-11-12 NOTE — TELEPHONE ENCOUNTER
----- Message from Heide Neri sent at 11/12/2020  9:44 AM CST -----  Pt states she was told by dr todd to call and get a hospital f/u please call back to discuss

## 2020-11-13 LAB
HBV SURFACE AB SER QL IA: POSITIVE
HBV SURFACE AB SERPL IA-ACNC: NORMAL MIU/ML

## 2020-11-17 ENCOUNTER — TELEPHONE (OUTPATIENT)
Dept: PULMONOLOGY | Facility: CLINIC | Age: 75
End: 2020-11-17

## 2020-11-17 NOTE — TELEPHONE ENCOUNTER
----- Message from Neda Brown sent at 11/17/2020  1:19 PM CST -----  Regarding: Sooner appointment wanted  Type:  Needs Medical Advice    Who Called:  patient  Symptoms (please be specific):  having breathing issues   How long has patient had these symptoms: last week (was in hospital)  Would the patient rather a call back or a response via [facility]?  Call back  Best Call Back Number:  [phone]  Additional Information:  wants to speak with you as the hospital told her to schedule a follow up

## 2020-11-17 NOTE — TELEPHONE ENCOUNTER
Spoke with patient, informed her that I have received her message. I advised patient that Dr Ruts does not return to clinic until January 2021 but however, I can schedule patient appointment with another provider. Patient verbalized that she understand and states that she would like to schedule appointment with a different provider. Patient appointment has been scheduled with ALBINO Rea on 11/19/20. Patient verbalized that she understand and excepts the appointment.

## 2020-11-20 ENCOUNTER — PATIENT OUTREACH (OUTPATIENT)
Dept: ADMINISTRATIVE | Facility: OTHER | Age: 75
End: 2020-11-20

## 2020-11-20 NOTE — PROGRESS NOTES
Health Maintenance Due   Topic Date Due    Shingles Vaccine (1 of 2) 06/08/1993    Influenza Vaccine (1) 08/01/2020     Updates were requested from care everywhere.  Chart was reviewed for overdue Proactive [facility] Encounters (SHALOM) topics (CRS, Breast Cancer Screening, Eye exam)  Health Maintenance has been updated.  LINKS immunization registry triggered.  Immunizations were reconciled.

## 2020-12-04 DIAGNOSIS — G89.29 CHRONIC NECK PAIN: ICD-10-CM

## 2020-12-04 DIAGNOSIS — M16.0 PRIMARY OSTEOARTHRITIS OF BOTH HIPS: ICD-10-CM

## 2020-12-04 DIAGNOSIS — M54.2 CHRONIC NECK PAIN: ICD-10-CM

## 2020-12-04 DIAGNOSIS — M54.41 CHRONIC MIDLINE LOW BACK PAIN WITH RIGHT-SIDED SCIATICA: ICD-10-CM

## 2020-12-04 DIAGNOSIS — G89.29 CHRONIC MIDLINE LOW BACK PAIN WITH RIGHT-SIDED SCIATICA: ICD-10-CM

## 2020-12-04 RX ORDER — HYDROCODONE BITARTRATE AND ACETAMINOPHEN 10; 325 MG/1; MG/1
1 TABLET ORAL 3 TIMES DAILY PRN
Qty: 90 TABLET | Refills: 0 | Status: SHIPPED | OUTPATIENT
Start: 2020-12-04 | End: 2021-01-19 | Stop reason: SDUPTHER

## 2020-12-04 NOTE — TELEPHONE ENCOUNTER
----- Message from Lorenza Fonseca sent at 12/4/2020  3:22 PM CST -----  Contact: Pt  Pt's requesting a refill on the medications below. Please send medication to pharmacy below.     HYDROcodone-acetaminophen (NORCO)  mg per tablet    traZODone (DESYREL) 50 MG tablet      [facility]/pharmacy #[id number] - [place] - [address] [facility]  [address] [place] [address]  Phone: [phone] Fax: [phone]

## 2020-12-07 ENCOUNTER — PATIENT MESSAGE (OUTPATIENT)
Dept: PULMONOLOGY | Facility: CLINIC | Age: 75
End: 2020-12-07

## 2020-12-08 ENCOUNTER — OFFICE VISIT (OUTPATIENT)
Dept: PULMONOLOGY | Facility: CLINIC | Age: 75
End: 2020-12-08
Payer: MEDICARE

## 2020-12-08 VITALS
WEIGHT: 101.63 LBS | OXYGEN SATURATION: 94 % | HEART RATE: 94 BPM | BODY MASS INDEX: 18.7 KG/M2 | HEIGHT: 62 IN | SYSTOLIC BLOOD PRESSURE: 124 MMHG | DIASTOLIC BLOOD PRESSURE: 62 MMHG

## 2020-12-08 DIAGNOSIS — J96.11 CHRONIC RESPIRATORY FAILURE WITH HYPOXIA, ON HOME O2 THERAPY: Chronic | ICD-10-CM

## 2020-12-08 DIAGNOSIS — J43.2 CENTRILOBULAR EMPHYSEMA: Primary | Chronic | ICD-10-CM

## 2020-12-08 DIAGNOSIS — Z99.81 CHRONIC RESPIRATORY FAILURE WITH HYPOXIA, ON HOME O2 THERAPY: Chronic | ICD-10-CM

## 2020-12-08 DIAGNOSIS — J90 PLEURAL EFFUSION: ICD-10-CM

## 2020-12-08 PROCEDURE — 1126F PR PAIN SEVERITY QUANTIFIED, NO PAIN PRESENT: ICD-10-PCS | Mod: HCNC,S$GLB,, | Performed by: NURSE PRACTITIONER

## 2020-12-08 PROCEDURE — 1101F PT FALLS ASSESS-DOCD LE1/YR: CPT | Mod: HCNC,CPTII,S$GLB, | Performed by: NURSE PRACTITIONER

## 2020-12-08 PROCEDURE — 99214 OFFICE O/P EST MOD 30 MIN: CPT | Mod: HCNC,S$GLB,, | Performed by: NURSE PRACTITIONER

## 2020-12-08 PROCEDURE — 3288F PR FALLS RISK ASSESSMENT DOCUMENTED: ICD-10-PCS | Mod: HCNC,CPTII,S$GLB, | Performed by: NURSE PRACTITIONER

## 2020-12-08 PROCEDURE — 3288F FALL RISK ASSESSMENT DOCD: CPT | Mod: HCNC,CPTII,S$GLB, | Performed by: NURSE PRACTITIONER

## 2020-12-08 PROCEDURE — 99214 PR OFFICE/OUTPT VISIT, EST, LEVL IV, 30-39 MIN: ICD-10-PCS | Mod: HCNC,S$GLB,, | Performed by: NURSE PRACTITIONER

## 2020-12-08 PROCEDURE — 99999 PR PBB SHADOW E&M-EST. PATIENT-LVL IV: ICD-10-PCS | Mod: PBBFAC,HCNC,, | Performed by: NURSE PRACTITIONER

## 2020-12-08 PROCEDURE — 1101F PR PT FALLS ASSESS DOC 0-1 FALLS W/OUT INJ PAST YR: ICD-10-PCS | Mod: HCNC,CPTII,S$GLB, | Performed by: NURSE PRACTITIONER

## 2020-12-08 PROCEDURE — 99999 PR PBB SHADOW E&M-EST. PATIENT-LVL IV: CPT | Mod: PBBFAC,HCNC,, | Performed by: NURSE PRACTITIONER

## 2020-12-08 PROCEDURE — 1126F AMNT PAIN NOTED NONE PRSNT: CPT | Mod: HCNC,S$GLB,, | Performed by: NURSE PRACTITIONER

## 2020-12-08 RX ORDER — BUSPIRONE HYDROCHLORIDE 5 MG/1
5 TABLET ORAL 3 TIMES DAILY
COMMUNITY
Start: 2020-10-12 | End: 2021-03-16

## 2020-12-08 RX ORDER — BUSPIRONE HYDROCHLORIDE 10 MG/1
TABLET ORAL
COMMUNITY
Start: 2020-12-02 | End: 2021-03-16

## 2020-12-08 RX ORDER — ALBUTEROL SULFATE 90 UG/1
2 AEROSOL, METERED RESPIRATORY (INHALATION) EVERY 6 HOURS PRN
Qty: 18 G | Refills: 3 | Status: SHIPPED | OUTPATIENT
Start: 2020-12-08 | End: 2022-04-11 | Stop reason: SDUPTHER

## 2020-12-08 RX ORDER — DIAZEPAM 2 MG/1
2 TABLET ORAL DAILY
COMMUNITY
Start: 2020-09-14 | End: 2021-03-16

## 2020-12-08 RX ORDER — ZOLPIDEM TARTRATE 5 MG/1
5 TABLET ORAL NIGHTLY
COMMUNITY
Start: 2020-11-11 | End: 2022-04-15

## 2020-12-08 RX ORDER — PAROXETINE 30 MG/1
TABLET, FILM COATED ORAL
COMMUNITY
Start: 2020-12-02 | End: 2021-02-11

## 2020-12-21 NOTE — PROGRESS NOTES
"  Subjective:       Patient ID: Katie Quevedo is a 75 y.o. female.    Chief Complaint: COPD follow up    HPI    (Previous note from Dr. Rust 5/28/2020 copied)  Katie Quevedo is a 74 y.o. female who presents for follow up of COPD & shortness of breath. PMH is significant for ESRD started on dialysis since her last visit, chronic diastolic CHF, pleural effusion, chronic hypoxemic respiratory failure on home O2. She uses trelegy once daily for COPD. The pleural effusion has been sampled twice in the past & was transudative based on light's criteria.   At baseline she reports MMRC3 symptoms of dyspnea. However at present, she notes episodes of "hyperventilation." These episodes occur at anytime but particularly at night & while at dialysis. She is not sure what triggers then but thinks it may be anxiety. She does note shortness of breath during the episodes, & it is hard to tease apart whether her symptoms are primarily anxiety related or breathing related. The episodes are not really relieved by her rescue inhaler/nebulizer. Twice in the past week they have had to cut her dialysis short due to these episodes. She denies chest pain, cough, fever.   She is a former smoker who quit 6 years ago.    Interval hx 12/8/2020-    Ms. Quevedo is a 74 y/o F presenting to pulmonary clinic for COPD follow up.   She has previously followed with Dr. Rust,but new to me.   Last month, explains having hospitalization for increased shortness of breath. Tells was not able to complete her dialysis treatments at that time.   In the hospital, had chest x-ray which showed left lower lobe pleural effusion.  IR was consulted for thoracentesis however the volume was to small.  Patient was diuresed.  Explains now has  Returned  back to dialysis as planned.  Today, patient reports that her baseline.  She remains using oxygen at 2 L per nasal cannula.  Currently, she reports compliant with daily use of Trelegy and Albuterol as needed. Explains has not " "followed with   Denies fever, chills, hemoptysis or weight loss. States has not followed with pulmonary rehab as previously ordered.     Social History     Tobacco Use   Smoking Status Former Smoker    Types: Cigarettes   Smokeless Tobacco Former User    Quit date: 2/3/2015    Reviewed allergies and medications.  Reviewed medical and surgical history.  Reviewed social and family history.    Review of Systems   Constitutional: Negative for fever, chills, weight loss and night sweats.   HENT: Negative for postnasal drip, rhinorrhea and congestion.    Respiratory: Negative for cough, sputum production, chest tightness, shortness of breath, wheezing and use of rescue inhaler.    Cardiovascular: Negative for chest pain and leg swelling.   Gastrointestinal: Negative for acid reflux.   Psychiatric/Behavioral: The patient is not nervous/anxious.          Objective:      Vitals:    12/08/20 1346   BP: 124/62   BP Location: Left arm   Patient Position: Sitting   Pulse: 94   SpO2: (!) 94%   Weight: 46.1 kg (101 lb 10.1 oz)   Height: 5' 2" (1.575 m)      Physical Exam   Constitutional: She is oriented to person, place, and time. She appears well-developed and well-nourished. No distress.   Cardiovascular: Normal rate, regular rhythm and normal heart sounds.   Pulmonary/Chest: Normal expansion and effort normal. No respiratory distress. She has no wheezes. She has no rhonchi.   Using 2 L per NC.    Abdominal: There is no abdominal tenderness.   Musculoskeletal: Normal range of motion.         General: No edema.   Neurological: She is alert and oriented to person, place, and time. Gait abnormal.   Using scooter.    Skin: Skin is warm and dry.   Psychiatric: She has a normal mood and affect. Her behavior is normal.   Vitals reviewed.    Personal Diagnostic Review    Lab Results   Component Value Date    PREFEV1 0.83 (L) 08/31/2019    YFY2RRFGQG 44.0 08/31/2019    PREFVC 1.61 (L) 08/31/2019    FVCPREREF 65.6 08/31/2019    " ZTUGGT6XFC 51.55 (L) 08/31/2019    ZRB4UUWESWC 66.4 08/31/2019    PREDLCO 10.58 (L) 08/31/2019    DLCOSBPRRF 55.5 08/31/2019    DLCOADJPRE 10.58 (L) 08/31/2019    DLCOCSBRPRRF 55.5 08/31/2019    POSTFEV1 0.98 (L) 08/31/2019    POSTFVC 1.75 08/31/2019    WMNDQMP2XLJ 55.94 (L) 08/31/2019      IR ULTRASOUND MEDIASTINUM CHEST  Narrative: EXAMINATION:  IR ULTRASOUND MEDIASTINUM CHEST    CLINICAL HISTORY:  pleural effusion;    TECHNIQUE:  Limited grayscale ultrasound was performed over the left chest prior to planned left thoracentesis.    COMPARISON:  Chest x-ray 11/09/2020    FINDINGS:  Trace loculated left pleural effusion which is smaller than it appears on chest x-ray.  The adjacent left lower lobe appears consolidated.  Volume of fluid is insufficient to warrant therapeutic thoracentesis.  No thoracentesis was performed.  Impression: As above.    Electronically signed by: Adria Ruiz  Date:    11/11/2020  Time:    03:52         Assessment:       1. Centrilobular emphysema    2. Pleural effusion    3. Chronic respiratory failure with hypoxia, on home O2 therapy        Outpatient Encounter Medications as of 12/8/2020   Medication Sig Dispense Refill    albuterol-ipratropium (DUO-NEB) 2.5 mg-0.5 mg/3 mL nebulizer solution Take 3 mLs by nebulization every 6 (six) hours as needed for Shortness of Breath. Rescue 3 Box 5    allopurinoL (ZYLOPRIM) 100 MG tablet Take 1 tablet (100 mg total) by mouth on Tuesday, Thursday, Saturday, and Sunday. 30 tablet 0    B complex-vitamin C-folic acid (LINDA-RADHA) 0.8 mg Tab Take 1 tablet (0.8 mg total) by mouth once daily. 30 tablet 11    busPIRone (BUSPAR) 10 MG tablet       busPIRone (BUSPAR) 5 MG Tab Take 5 mg by mouth 3 (three) times daily.      clonazePAM (KLONOPIN) 1 MG tablet TAKE 1 TABLET (1 MG TOTAL) BY MOUTH DAILY AS NEEDED (ON DIALYSIS DAYS FOR ANXIETY.). 20 tablet 1    diazePAM (VALIUM) 2 MG tablet Take 2 mg by mouth once daily.      diphenhydrAMINE (BENADRYL) 25 mg  capsule Take 25 mg by mouth every 6 (six) hours as needed for Itching.      epoetin hemant-epbx (RETACRIT) 10,000 unit/mL imjection Inject 0.5 mLs (5,000 Units total) into the skin every Mon, Wed, Fri.      ergocalciferol (ERGOCALCIFEROL) 50,000 unit Cap Take 1 capsule by mouth once weekly for 10 weeks, then take 1 capsule by mouth once monthly. 30 capsule 0    escitalopram oxalate (LEXAPRO) 10 MG tablet Take 1 tablet (10 mg total) by mouth once daily. 30 tablet 11    fluticasone-umeclidin-vilanter (TRELEGY ELLIPTA) 100-62.5-25 mcg DsDv Inhale 1 puff by mouth into the lungs once daily. 60 each 3    furosemide (LASIX) 80 MG tablet Take 1 tablet (80 mg total) by mouth twice daily on non dialysis days Tuesday, Thursday, Saturday, and Sunday. 32 tablet 0    HYDROcodone-acetaminophen (NORCO)  mg per tablet Take 1 tablet by mouth 3 (three) times daily as needed. 90 tablet 0    lidocaine-prilocaine (EMLA) cream Apply topically to left arm prior to dialysis. 30 g 0    megestroL (MEGACE) 40 MG Tab take  1 tablet by mouth every day 30 tablet 1    mupirocin (BACTROBAN) 2 % ointment apply by Nasal route 2 (two) times daily. 22 g 0    ondansetron (ZOFRAN-ODT) 4 MG TbDL Dissolve one tablet under the tongue every 6 (six) hours as needed. 30 tablet 3    paroxetine (PAXIL) 30 MG tablet       potassium chloride SA (K-DUR,KLOR-CON) 20 MEQ tablet Take 1 tablet (20 mEq total) by mouth every Tuesday, Thursday, Saturday, Sunday. 20 tablet 0    traZODone (DESYREL) 50 MG tablet 1-2 tablets at bedtime as needed for insomnia 180 tablet 3    zolpidem (AMBIEN) 5 MG Tab Take 5 mg by mouth nightly.      albuterol (VENTOLIN HFA) 90 mcg/actuation inhaler Inhale 2 puffs into the lungs every 6 (six) hours as needed for Wheezing or Shortness of Breath. Rescue 18 g 3    sodium bicarbonate 650 MG tablet Take 1 tablet (650 mg total) by mouth 2 (two) times daily. 120 tablet 11     No facility-administered encounter medications on file  as of 12/8/2020.      No orders of the defined types were placed in this encounter.      Plan:           Problem List Items Addressed This Visit        Pulmonary    Chronic respiratory failure with hypoxia, on home O2 therapy (Chronic)    Overview     Home Oxygen    Face to face visit with pt regarding their home oxygen.  We have discussed the need for oxygen including continuous use, prn use or night time use (as appropriate for the pt).  We discussed the need for compliance with the therapy. We will do recertifications as necessary.            COPD (chronic obstructive pulmonary disease) - Primary (Chronic)    Overview     Pt has MMRC 3 symptoms of dyspnea at baseline. It is not clear to me that her current symptoms are related to her COPD. Recommend continuing trelegy for now & prn nebulizer treatments.          Current Assessment & Plan     Discussed with patient on the benefits of pulmonary rehab.  Provided patient with pamphlet.  Patient will notify of interested in starting  pulmonary rehab (she is considering rehab at [facility]). Patient requesting refill on albuterol.          Relevant Medications    albuterol (VENTOLIN HFA) 90 mcg/actuation inhaler    Pleural effusion    Current Assessment & Plan     Patient with chronic left-sided pleural effusion.  While in hospital, IR was consulted but was noted volume was too small to warrant thoracentesis.  Collaborated with Dr. Rust recommend no thoracentesis at this time            Follow up in 6 months with Dr. Rust or sooner if needed.   *collaborated with Dr. Rust.   This note is dictated on M*Modal word recognition program.  There are word recognition mistakes that are occasionally missed on review.

## 2020-12-22 NOTE — ASSESSMENT & PLAN NOTE
Discussed with patient on the benefits of pulmonary rehab.  Provided patient with pamphlet.  Patient will notify of interested in starting  pulmonary rehab (she is considering rehab at [facility]). Patient requesting refill on albuterol.

## 2020-12-22 NOTE — ASSESSMENT & PLAN NOTE
Patient with chronic left-sided pleural effusion.  While in hospital, IR was consulted but was noted volume was too small to warrant thoracentesis.  Collaborated with Dr. Rust recommend no thoracentesis at this time

## 2020-12-25 ENCOUNTER — HOSPITAL ENCOUNTER (EMERGENCY)
Facility: HOSPITAL | Age: 75
Discharge: HOME | End: 2020-12-25
Attending: HOSPITALIST | Admitting: HOSPITALIST
Payer: MEDICARE

## 2020-12-25 VITALS
OXYGEN SATURATION: 100 % | DIASTOLIC BLOOD PRESSURE: 57 MMHG | TEMPERATURE: 97 F | SYSTOLIC BLOOD PRESSURE: 111 MMHG | RESPIRATION RATE: 20 BRPM | HEART RATE: 80 BPM

## 2020-12-25 DIAGNOSIS — R41.82 ALTERED MENTAL STATUS: ICD-10-CM

## 2020-12-25 DIAGNOSIS — E86.0 DEHYDRATION: Primary | ICD-10-CM

## 2020-12-25 LAB
ALBUMIN SERPL BCP-MCNC: 3.2 G/DL (ref 3.5–5.2)
ALP SERPL-CCNC: 279 U/L (ref 55–135)
ALT SERPL W/O P-5'-P-CCNC: 15 U/L (ref 10–44)
ANION GAP SERPL CALC-SCNC: 13 MMOL/L (ref 8–16)
ANISOCYTOSIS BLD QL SMEAR: SLIGHT
AST SERPL-CCNC: 42 U/L (ref 10–40)
BACTERIA #/AREA URNS HPF: NORMAL /HPF
BASOPHILS # BLD AUTO: 0.02 K/UL (ref 0–0.2)
BASOPHILS NFR BLD: 0.3 % (ref 0–1.9)
BILIRUB SERPL-MCNC: 0.9 MG/DL (ref 0.1–1)
BILIRUB UR QL STRIP: NEGATIVE
BUN SERPL-MCNC: 19 MG/DL (ref 8–23)
CALCIUM SERPL-MCNC: 8.7 MG/DL (ref 8.7–10.5)
CHLORIDE SERPL-SCNC: 99 MMOL/L (ref 95–110)
CLARITY UR: CLEAR
CO2 SERPL-SCNC: 27 MMOL/L (ref 23–29)
COLOR UR: YELLOW
CREAT SERPL-MCNC: 1.5 MG/DL (ref 0.5–1.4)
CTP QC/QA: YES
DIFFERENTIAL METHOD: ABNORMAL
EOSINOPHIL # BLD AUTO: 0 K/UL (ref 0–0.5)
EOSINOPHIL NFR BLD: 0.3 % (ref 0–8)
ERYTHROCYTE [DISTWIDTH] IN BLOOD BY AUTOMATED COUNT: 14.6 % (ref 11.5–14.5)
EST. GFR  (AFRICAN AMERICAN): 38 ML/MIN/1.73 M^2
EST. GFR  (NON AFRICAN AMERICAN): 33 ML/MIN/1.73 M^2
GLUCOSE SERPL-MCNC: 87 MG/DL (ref 70–110)
GLUCOSE UR QL STRIP: NEGATIVE
HCT VFR BLD AUTO: 39 % (ref 37–48.5)
HGB BLD-MCNC: 12.7 G/DL (ref 12–16)
HGB UR QL STRIP: NEGATIVE
HYALINE CASTS #/AREA URNS LPF: 0 /LPF
HYPOCHROMIA BLD QL SMEAR: ABNORMAL
IMM GRANULOCYTES # BLD AUTO: 0.04 K/UL (ref 0–0.04)
IMM GRANULOCYTES NFR BLD AUTO: 0.7 % (ref 0–0.5)
KETONES UR QL STRIP: NEGATIVE
LACTATE SERPL-SCNC: 1.2 MMOL/L (ref 0.5–2.2)
LEUKOCYTE ESTERASE UR QL STRIP: NEGATIVE
LYMPHOCYTES # BLD AUTO: 0.9 K/UL (ref 1–4.8)
LYMPHOCYTES NFR BLD: 15.6 % (ref 18–48)
MCH RBC QN AUTO: 30.3 PG (ref 27–31)
MCHC RBC AUTO-ENTMCNC: 32.6 G/DL (ref 32–36)
MCV RBC AUTO: 93 FL (ref 82–98)
MICROSCOPIC COMMENT: NORMAL
MONOCYTES # BLD AUTO: 0.6 K/UL (ref 0.3–1)
MONOCYTES NFR BLD: 10.2 % (ref 4–15)
NEUTROPHILS # BLD AUTO: 4.3 K/UL (ref 1.8–7.7)
NEUTROPHILS NFR BLD: 72.9 % (ref 38–73)
NITRITE UR QL STRIP: NEGATIVE
NRBC BLD-RTO: 0 /100 WBC
PH UR STRIP: 6 [PH] (ref 5–8)
PLATELET # BLD AUTO: 184 K/UL (ref 150–350)
PLATELET BLD QL SMEAR: ABNORMAL
PMV BLD AUTO: 10.5 FL (ref 9.2–12.9)
POTASSIUM SERPL-SCNC: 3 MMOL/L (ref 3.5–5.1)
PROT SERPL-MCNC: 7.8 G/DL (ref 6–8.4)
PROT UR QL STRIP: ABNORMAL
RBC # BLD AUTO: 4.19 M/UL (ref 4–5.4)
RBC #/AREA URNS HPF: 2 /HPF (ref 0–4)
SARS-COV-2 RDRP RESP QL NAA+PROBE: NEGATIVE
SODIUM SERPL-SCNC: 139 MMOL/L (ref 136–145)
SP GR UR STRIP: 1.01 (ref 1–1.03)
URN SPEC COLLECT METH UR: ABNORMAL
UROBILINOGEN UR STRIP-ACNC: NEGATIVE EU/DL
WBC # BLD AUTO: 5.96 K/UL (ref 3.9–12.7)
WBC #/AREA URNS HPF: 3 /HPF (ref 0–5)

## 2020-12-25 PROCEDURE — 93010 EKG 12-LEAD: ICD-10-PCS | Mod: HCNC,,, | Performed by: INTERNAL MEDICINE

## 2020-12-25 PROCEDURE — 99285 EMERGENCY DEPT VISIT HI MDM: CPT | Mod: 25,HCNC

## 2020-12-25 PROCEDURE — 80053 COMPREHEN METABOLIC PANEL: CPT | Mod: HCNC

## 2020-12-25 PROCEDURE — 81000 URINALYSIS NONAUTO W/SCOPE: CPT | Mod: HCNC

## 2020-12-25 PROCEDURE — 85025 COMPLETE CBC W/AUTO DIFF WBC: CPT | Mod: HCNC

## 2020-12-25 PROCEDURE — 25000003 PHARM REV CODE 250: Mod: HCNC | Performed by: EMERGENCY MEDICINE

## 2020-12-25 PROCEDURE — 83605 ASSAY OF LACTIC ACID: CPT | Mod: HCNC

## 2020-12-25 PROCEDURE — 93010 ELECTROCARDIOGRAM REPORT: CPT | Mod: HCNC,,, | Performed by: INTERNAL MEDICINE

## 2020-12-25 PROCEDURE — U0002 COVID-19 LAB TEST NON-CDC: HCPCS | Mod: HCNC | Performed by: EMERGENCY MEDICINE

## 2020-12-25 PROCEDURE — 93005 ELECTROCARDIOGRAM TRACING: CPT | Mod: HCNC

## 2020-12-25 PROCEDURE — 96360 HYDRATION IV INFUSION INIT: CPT | Mod: HCNC

## 2020-12-25 PROCEDURE — 87040 BLOOD CULTURE FOR BACTERIA: CPT | Mod: 59,HCNC

## 2020-12-25 RX ORDER — POTASSIUM CHLORIDE 20 MEQ/1
20 TABLET, EXTENDED RELEASE ORAL
Status: COMPLETED | OUTPATIENT
Start: 2020-12-25 | End: 2020-12-25

## 2020-12-25 RX ORDER — POTASSIUM CHLORIDE 750 MG/1
10 TABLET, EXTENDED RELEASE ORAL 2 TIMES DAILY
Qty: 6 TABLET | Refills: 0 | Status: SHIPPED | OUTPATIENT
Start: 2020-12-25 | End: 2021-01-01

## 2020-12-25 RX ADMIN — SODIUM CHLORIDE 1000 ML: 0.9 INJECTION, SOLUTION INTRAVENOUS at 06:12

## 2020-12-25 RX ADMIN — POTASSIUM CHLORIDE 20 MEQ: 1500 TABLET, EXTENDED RELEASE ORAL at 08:12

## 2020-12-25 NOTE — DISCHARGE SUMMARY
[address] [place] [address]  Day 3 Interdisciplinary Treatment Plan NOTE    Review Date & Time: 12/25/2020 8722     Patient was in treatment team    Admission Type:   Admission Type: Involuntary    Reason for admission:  Reason for Admission: \"CAUSE I  SAID  I'D MIGHT AS WELL KILL MYSELF. MY MEDICINE WAS'T WORKING RIGHT AND TODAY I SAW THAT ANGELES WITH THE SCSSORS IN THE BATHROOM AT [facility] TODAY\"  Estimated Length of Stay Update:  5-7 days   Estimated Discharge Date Update: 12/29/2020    PATIENT STRENGTHS:  Patient Strengths Strengths: Communication, Connection to output provider, Positive Support, Medication Compliance, No significant Physical Illness, Social Skills  Patient Strengths and Limitations:Limitations: Multiple barriers to leisure interests, Tendency to isolate self, Difficulty problem solving/relies on others to help solve problems  Addictive Behavior:Addictive Behavior  In the past 3 months, have you felt or has someone told you that you have a problem with:  : None  Do you have a history of Chemical Use?: No  Do you have a history of Alcohol Use?: No  Do you have a history of Street Drug Abuse?: No  Histroy of Prescripton Drug Abuse?: No  Medical Problems:  Past Medical History:   Diagnosis Date    Colitis     1996       Risk:  Fall RiskTotal: 73  Merlin Scale Merlin Scale Score: 22  BVC Total: 0  Change in scores none.  Changes to plan of Care  none    Status EXAM:   Status and Exam  Normal: Yes(Resting in bed apparently asleep)  Facial Expression: Sad, Worried  Affect: Constricted  Level of Consciousness: Alert  Mood:Normal: No  Mood: Anxious, Depressed, Suspicious, Sad  Motor Activity:Normal: Yes  Motor Activity: Decreased  Interview Behavior: Cooperative, Evasive  Preception: [place] to Person, Verlon Craw to Time, [place] to Place, [place] to Situation  Attention:Normal: Yes  Attention: Distractible  Thought Processes: (appropriate but is superficial and guarded)  Thought Content:Normal: No  Thought Content: [facility]  Discharge Summary      Patient Name: Katie Quevedo  MRN: [id number]  Admission Date: 2/10/2020  Hospital Length of Stay: 0 days  Discharge Date and Time: 2/14/2020  5:59 PM  Attending Physician: Jennifer Bowles*   Discharging Provider: Jennifer Bowles MD  Primary Care Provider: Kingston Verduzco MD      HPI:   Katie Quevedo is a 74 y.o. female with past medical history of anemia, asthma, bilateral renal cysts, chronic pain, CKD stage 5 (has not started HD), COPD on 2L home O2, dehydration, HTN, lumbar spondylosis, migraines, osteoporosis, osteoarthritis, pulmonary embolism, and seizures who presented to ED with worsening SOB and BERTRAND. Onset 3 days ago, gradually becoming worse. Patient reports associated cough/congestion and lower extremity edema. She denies chest pain and fever/chills. Reports decreased urinary output. Labs remarkable for Na 125, K 5.2, BUN/Cr 57/4.0 (previously 32/2.9 on 2/3/20),  , procalcitonin 0.31. Pt afebrile. CXR shows increase in left pleural effusion with associated compressive atelectasis and/or lower lobe consolidation. She received furosemide 80 mg, vancomycin, cefepime and azithromycin. Patient admitted to [facility].     * No surgery found *      Hospital Course:   No notes on file     Consults:   Consults (From admission, onward)        Status Ordering Provider     Inpatient consult to [facility] (Caterina Bustillos Nimkevych)  Once     Provider:  (Not yet assigned)    Completed LALA CARNES     Inpatient consult to Social Work  Once     Provider:  (Not yet assigned)    Acknowledged SHUN WINTER     Inpatient consult to Spiritual Care  Once     Provider:  (Not yet assigned)    JENNIFER Morse          * Acute on chronic respiratory failure with hypoxia  Pleural effusion  ? Left lower lobe pneumonia   Supplemental home O2 (usually on 2L)   Acute kidney injury  superimposed on CKD  Volume overload  Continue furosemide at home dose-  20 mg  BID, titrate as needed   Continue empiric cefepime, follow blood cultures, prn decongestants and  Nebs   Consider IR consult for thoracentesis- improved  Continue supplemental O2         Hyponatremia  Fluid restriction      COPD (chronic obstructive pulmonary disease)  Chronic  continue Trelegy Ellipta, supplemental O2       Anemia, chronic renal failure, stage 4 (severe)  stable      Acute kidney injury superimposed on CKD  Anemia, chronic renal failure, stage 4 (severe)  Hyponatremia   Hyperkalemia     Pt has Left AV graft. Reports has not started dialysis.   Nephrology consulted. Avoid nephrotoxic meds        Essential hypertension  Diastolic dysfunction, left ventricle  Troponin negative  BNP mildly elevated  Continue amlodipine and furosemide       Final Active Diagnoses:    Diagnosis Date Noted POA    PRINCIPAL PROBLEM:  Acute on chronic respiratory failure with hypoxia [J96.21] 06/19/2019 Yes    Hyponatremia [E87.1] 02/11/2020 Yes    Diastolic dysfunction, left ventricle [I51.9] 02/04/2020 Yes    COPD (chronic obstructive pulmonary disease) [J44.9] 12/18/2019 Yes    Acute kidney injury superimposed on CKD [N17.9, N18.9]  Yes    Anemia, chronic renal failure, stage 4 (severe) [N18.4, D63.1]  Yes    Pleural effusion [J90]  Yes    Essential hypertension [I10] 01/22/2018 Yes     Chronic      Problems Resolved During this Admission:       Discharged Condition: stable    Disposition:     Follow Up:    Patient Instructions:   No discharge procedures on file.    Significant Diagnostic Studies:     Pending Diagnostic Studies:     Procedure Component Value Units Date/Time    Hepatitis C RNA, quantitative, PCR [[id number]] Collected:  02/13/20 1327    Order Status:  Sent Lab Status:  In process Updated:  02/13/20 1639    Specimen:  Blood     Narrative:       Specimen collection performed by Alternate Phlebotomist: sj  Specimen  collection performed by Alternate Phlebotomist: terrie    Hepatitis C Viral RNA Genotype, LIPA [[id number]] Collected:  02/13/20 1327    Order Status:  Sent Lab Status:  In process Updated:  02/13/20 1650    Specimen:  Blood     Narrative:       Specimen collection performed by Alternate Phlebotomist: terrie     Hemodialysis Access [[id number]]     Order Status:  Sent Lab Status:  No result          Medications:  Reconciled Home Medications:      Medication List      START taking these medications    mupirocin 2 % ointment  Commonly known as:  BACTROBAN  apply by Nasal route 2 (two) times daily.        CONTINUE taking these medications    albuterol-ipratropium 2.5 mg-0.5 mg/3 mL nebulizer solution  Commonly known as:  DUO-NEB  Take 3 mLs by nebulization every 6 (six) hours as needed for Shortness of Breath. Rescue     allopurinoL 100 MG tablet  Commonly known as:  ZYLOPRIM  Take 1 tablet (100 mg total) by mouth once daily. Take 100 mg -on Monday and Friday only     amLODIPine 2.5 MG tablet  Commonly known as:  NORVASC  Take 2.5 mg by mouth once daily.     busPIRone 5 MG Tab  Commonly known as:  BUSPAR  Take 5 mg by mouth 3 (three) times daily.     cyproheptadine 4 mg tablet  Commonly known as:  PERIACTIN  Take 1 tablet (4 mg total) by mouth every 8 (eight) hours.     diazePAM 2 MG tablet  Commonly known as:  VALIUM  Take 2 mg by mouth once daily.     diphenhydrAMINE 25 mg capsule  Commonly known as:  BENADRYL  Take 25 mg by mouth every 6 (six) hours as needed for Itching.     furosemide 20 MG tablet  Commonly known as:  LASIX  Take 1 tablet (20 mg total) by mouth 2 (two) times daily.     HYDROcodone-acetaminophen  mg per tablet  Commonly known as:  NORCO  Take 1 tablet by mouth 3 (three) times daily as needed.     ondansetron 4 MG Tbdl  Commonly known as:  ZOFRAN-ODT  Dissolve one tablet under the tongue every 6 (six) hours as needed.     sodium bicarbonate 650 MG tablet  Take 1 tablet (650 mg total) by mouth 2 (two)  times daily.     traZODone 100 MG tablet  Commonly known as:  DESYREL  Take 100 mg by mouth nightly as needed for Insomnia.     Trelegy Ellipta 100-62.5-25 mcg Dsdv  Generic drug:  fluticasone-umeclidin-vilanter  Inhale 1 puff by mouth into the lungs once daily.     zolpidem 5 MG Tab  Commonly known as:  AMBIEN  Take 5 mg by mouth every evening.        STOP taking these medications    albuterol 2.5 mg /3 mL (0.083 %) nebulizer solution  Commonly known as:  PROVENTIL            Indwelling Lines/Drains at time of discharge:   Lines/Drains/Airways     Airway                 Airway - Non-Surgical 02/03/20 1252 Nasal Cannula 10 days                Time spent on the discharge of patient: 35 minutes  Patient was seen and examined on the date of discharge and determined to be suitable for discharge.         Jennifer Bowles MD  Department of Hospital Medicine  [facility]

## 2020-12-25 NOTE — ED NOTES
"Pt brought to ED by spouse who reports pt has been lethargic since mid-morning. Pt opens eyes very briefly to voice, unable to hold eyes open or hold a conversation. Pt falls asleep mid-sentence. Pts pupils are pinpoint. Pts  states pt has been "very out of it" today and may have overmedicated herself. Pt has hx of CKD and cancer and takes pain meds at home. Pt also has hx of COPD and where 2 liters O2 NC at home. Pt is tachypneic w/ O2 sates in mid-90s on room air.  at bedside. Will continue to monitor.  "

## 2020-12-25 NOTE — ED PROVIDER NOTES
Encounter Date: 12/25/2020       History     Chief Complaint   Patient presents with    Fatigue     pt presents to ED today c/o generalized weakness, cough, and diarrhea x 3 days.      The patient is a 75-year-old female who presents the emergency department with lethargy today.  Her , who cares for her at home, states she has not been eating lately and has lost a lot of weight.  She barely gets out of bed and has been sleeping all the time.  Today he was unable to awaken her.  She has had no fever, no nausea or vomiting but she has had diarrhea for the past few days..  She has chronic low back pain, congestive heart failure, COPD, CKD stage 4 on dialysis.  The patient also does have access to her medications including pain medications sleep medications in anxiolytics.  The patient's  states she does make urine once or twice a day.        Review of patient's allergies indicates:   Allergen Reactions    Aspirin      Other reaction(s): hx of ulcers    Tetracycline Swelling     Other reaction(s): Swelling    Penicillins Rash     Other reaction(s): Hives  Other reaction(s): Rash  Other reaction(s): Rash  Other reaction(s): Hives     Past Medical History:   Diagnosis Date    Acute congestive heart failure 02/10/2020    Anemia     Bilateral renal cysts     Cataract     Chronic LBP 7/26/2012    Chronic pain     CKD (chronic kidney disease), stage IV     Colon polyp 2013    COPD (chronic obstructive pulmonary disease)     Dehydration     Encounter for blood transfusion     HTN (hypertension)     Lumbar spondylosis     Melanoma     of the lip    Metabolic bone disease     Migraines, neuralgic     Osteoporosis     Primary osteoarthritis of both knees     s/p Rt TKA    Pulmonary embolism with infarction     Seizures 1972    x1 only    Subdeltoid bursitis, L>R. 9/27/2012    Ulcer     Vitamin D deficiency disease      Past Surgical History:   Procedure Laterality Date    BLADDER  SUSPENSION      CATARACT EXTRACTION  11/18/13    left eye    CERVICAL LAMINECTOMY      x3, fusion x1    COLONOSCOPY  2009    FISTULOGRAM N/A 2/27/2020    Procedure: Fistulogram;  Surgeon: Noe Benitez MD;  Location: [facility] CATH LAB/EP;  Service: Cardiology;  Laterality: N/A;    HYSTERECTOMY      JOINT REPLACEMENT  2001    total right knee     LUMBAR LAMINECTOMY      x 3, fusion x1    OOPHORECTOMY      PERIPHERAL ANGIOGRAPHY N/A 3/9/2020    Procedure: Peripheral angiography;  Surgeon: Jacinto Henriquez MD;  Location: [facility] CATH LAB/EP;  Service: Cardiology;  Laterality: N/A;    PLACEMENT OF ARTERIOVENOUS GRAFT Left 1/21/2020    Procedure: INSERTION, GRAFT, ARTERIOVENOUS;  Surgeon: Lindsey Louie MD;  Location: [facility] OR;  Service: General;  Laterality: Left;    THROMBECTOMY Left 2/3/2020    Procedure: THROMBECTOMY;  Surgeon: Lindsey Louie MD;  Location: [facility] OR;  Service: General;  Laterality: Left;    THROMBECTOMY  3/9/2020    Procedure: Thrombectomy;  Surgeon: Jacinto Henriquez MD;  Location: [facility] CATH LAB/EP;  Service: Cardiology;;     Family History   Problem Relation Age of Onset    Arthritis Mother     Stroke Mother     Hypertension Father     Cancer Father     Cataracts Father     Diabetes Maternal Aunt     Hypertension Maternal Grandfather     Heart disease Maternal Grandfather     Heart attack Maternal Grandfather     Cataracts Sister     Glaucoma Cousin      Social History     Tobacco Use    Smoking status: Former Smoker     Types: Cigarettes    Smokeless tobacco: Former User     Quit date: 2/3/2015   Substance Use Topics    Alcohol use: Not Currently     Comment: Rare    Drug use: No     Review of Systems   Constitutional: Positive for activity change, appetite change and fatigue. Negative for fever.   HENT: Negative for sore throat.    Respiratory: Negative for shortness of breath and wheezing.    Cardiovascular: Negative for chest pain.   Gastrointestinal: Positive  for diarrhea. Negative for nausea and vomiting.   Genitourinary: Negative for dysuria.   Musculoskeletal: Negative for back pain.   Skin: Negative for rash.   Neurological: Positive for speech difficulty and weakness.   Hematological: Does not bruise/bleed easily.   Psychiatric/Behavioral: Negative for behavioral problems, confusion, self-injury and suicidal ideas. The patient is not nervous/anxious.        Physical Exam     Initial Vitals   BP Pulse Resp Temp SpO2   12/25/20 1431 12/25/20 1421 12/25/20 1431 12/25/20 1431 12/25/20 1421   120/64 108 18 97.7 °F (36.5 °C) 96 %      MAP       --                Physical Exam    Nursing note and vitals reviewed.  Constitutional: She appears well-developed. She is not diaphoretic. No distress.   Cachectic   HENT:   Head: Normocephalic and atraumatic.   Mouth/Throat: Oropharynx is clear and moist.   Eyes: Conjunctivae and EOM are normal. Pupils are equal, round, and reactive to light.   Neck: Normal range of motion. Neck supple.   Cardiovascular: Normal rate, regular rhythm, normal heart sounds and intact distal pulses. Exam reveals no gallop and no friction rub.    No murmur heard.  Pulmonary/Chest: Breath sounds normal.   Abdominal: Soft. Bowel sounds are normal. She exhibits no distension. There is no abdominal tenderness. There is no rebound and no guarding.   Musculoskeletal: Normal range of motion. No tenderness or edema.   Lymphadenopathy:     She has no cervical adenopathy.   Neurological: She is alert and oriented to person, place, and time. She has normal strength and normal reflexes.   Skin: Skin is warm and dry.   Psychiatric: She has a normal mood and affect. Her behavior is normal. Judgment and thought content normal.         ED Course   Procedures  Labs Reviewed   CBC W/ AUTO DIFFERENTIAL - Abnormal; Notable for the following components:       Result Value    RDW 14.6 (*)     Immature Granulocytes 0.7 (*)     Lymph # 0.9 (*)     Lymph % 15.6 (*)     All other  components within normal limits   COMPREHENSIVE METABOLIC PANEL - Abnormal; Notable for the following components:    Potassium 3.0 (*)     Creatinine 1.5 (*)     Albumin 3.2 (*)     Alkaline Phosphatase 279 (*)     AST 42 (*)     eGFR if  38 (*)     eGFR if non  33 (*)     All other components within normal limits   URINALYSIS, REFLEX TO URINE CULTURE - Abnormal; Notable for the following components:    Protein, UA 1+ (*)     All other components within normal limits    Narrative:     Specimen Source->Urine   CULTURE, BLOOD   CULTURE, BLOOD   LACTIC ACID, PLASMA   URINALYSIS MICROSCOPIC    Narrative:     Specimen Source->Urine   SARS-COV-2 RDRP GENE    Narrative:     This test utilizes isothermal nucleic acid amplification   technology to detect the SARS-CoV-2 RdRp nucleic acid segment.   The analytical sensitivity (limit of detection) is 125 genome   equivalents/mL.   A POSITIVE result implies infection with the SARS-CoV-2 virus;   the patient is presumed to be contagious.     A NEGATIVE result means that SARS-CoV-2 nucleic acids are not   present above the limit of detection. A NEGATIVE result should be   treated as presumptive. It does not rule out the possibility of   COVID-19 and should not be the sole basis for treatment decisions.   If COVID-19 is strongly suspected based on clinical and exposure   history, re-testing using an alternate molecular assay should be   considered.   This test is only for use under the Food and Drug   Administration s Emergency Use Authorization (EUA).   Commercial kits are provided by [employer].   Performance characteristics of the EUA have been independently   verified by [facility] Department of   Pathology and Laboratory Medicine.   _________________________________________________________________   The authorized Fact Sheet for Healthcare Providers and the authorized Fact   Sheet for Patients of the ID NOW COVID-19 are  available on the FDA   website:     https://www.fda.gov/media/990306/download  https://www.fda.gov/media/897247/download                Imaging Results          X-Ray Chest AP Portable (Final result)  Result time 12/25/20 16:14:28    Final result by Richar Pickett MD (12/25/20 16:14:28)                 Impression:      As above.      Electronically signed by: Richar Pickett MD  Date:    12/25/2020  Time:    16:14             Narrative:    EXAMINATION:  XR CHEST AP PORTABLE    CLINICAL HISTORY:  Sepsis;    TECHNIQUE:  Single frontal view of the chest was performed.    FINDINGS:  There is a moderate amount of left lower lung zone opacity unchanged compared to most recent chest radiograph performed 11/09/2020.  There is no pneumothorax.  The cardiac silhouette is not appear enlarged.  There is calcification of the aorta.  The osseous structures demonstrate degenerative change.                                                   ED Course as of Dec 25 2005   Fri Dec 25, 2020   1814 Patient meets SIRS criteria however she has no source of an infection.  Awaiting urinalysis    [ST]   1912 The patient was given a L of fluids and she is now awake and alert and speaking.  The  states she is back to baseline.  The patient received a straight cath for urine specimen.  I feel the patient is likely overmedicated or self medicating, she has had diarrhea for the past few days and was likely dehydrated.  She is now awake and alert.  I will await her urinalysis and if negative she will be discharged.  If positive for urinary tract infection, she will be started on antibiotics.    [ST]   1916 The patient will be signed out to Dr. Rdz at 7:30 p.m..  Awaiting urinalysis.  If negative, patient will be discharged home.  If positive for UTI, the patient will be started on antibiotics and discharged home.  She has a negative lactic acid and a normal white count.  She is afebrile and has now awake and some no longer has altered mental  status.    [ST]   1940 Pt endorsed to me by Dr. ANGELA Sheth pending UA, assessment and dispo.    [NP]   1954 Urinalysis negative. Will reassess.    [NP]   2003 Patient awake, insert questions, conversive at bedside.  Blood pressure 111/50, which is baseline for patient.  Potassium noted to be low, will give supplementation here and Rx for short course at home.  Encourage hydration, follow-up in 1 week with PCP or return for any emergent concerns    [NP]      ED Course User Index  [NP] Hood Rdz MD  [ST] Christal Sheth MD            Clinical Impression:       ICD-10-CM ICD-9-CM   1. Dehydration  E86.0 276.51   2. Altered mental status  R41.82 780.97                                               Hood Rdz MD  12/25/20 2005

## 2020-12-26 ENCOUNTER — NURSE TRIAGE (OUTPATIENT)
Dept: ADMINISTRATIVE | Facility: CLINIC | Age: 75
End: 2020-12-26

## 2020-12-26 NOTE — ED NOTES
Pt alert, oriented, and ambulatory from stretcher to wheelchair at the time of d/c. Pt being taken to car in front of ER via wheelchair with ED tech. Pt stable, without distress at this time.

## 2020-12-26 NOTE — ED NOTES
Pt is awake and answering questions. Pt denies taking any medications today. Pt denies pain. Attempting to provide urine sample.

## 2020-12-30 LAB
BACTERIA BLD CULT: NORMAL
BACTERIA BLD CULT: NORMAL

## 2020-12-31 ENCOUNTER — NURSE TRIAGE (OUTPATIENT)
Dept: ADMINISTRATIVE | Facility: CLINIC | Age: 75
End: 2020-12-31

## 2020-12-31 ENCOUNTER — TELEPHONE (OUTPATIENT)
Dept: FAMILY MEDICINE | Facility: CLINIC | Age: 75
End: 2020-12-31

## 2020-12-31 NOTE — TELEPHONE ENCOUNTER
occ Rn  Patient calling in with diarrhea for 6 days.  She went to hospital [facility] with Diarrhea.  Has had diarrhea 3-4 day.  Was told from ED dr from [facility] to follow up with doctor.  Care proctcol today is see dr within 24 hours.  Advised her to go to Urgent care in [place],   Gave her all information.  Patient LORENE  Said has been taking immodium like water, not working.     Reason for Disposition   [1] MODERATE diarrhea (e.g., 4-6 times / day more than normal) AND [2] present > 48 hours (2 days)    Additional Information   Negative: Shock suspected (e.g., cold/pale/clammy skin, too weak to stand, low BP, rapid pulse)   Negative: Difficult to awaken or acting confused (e.g., disoriented, slurred speech)   Negative: Sounds like a life-threatening emergency to the triager   Negative: [1] SEVERE abdominal pain (e.g., excruciating) AND [2] present > 1 hour   Negative: [1] SEVERE abdominal pain AND [2] age > 60   Negative: [1] Blood in the stool AND [2] moderate or large amount of blood   Negative: Black or tarry bowel movements  (Exception: chronic-unchanged  black-grey bowel movements AND is taking iron pills or Pepto-bismol)   Negative: [1] Drinking very little AND [2] dehydration suspected (e.g., no urine > 12 hours, very dry mouth, very lightheaded)   Negative: Patient sounds very sick or weak to the triager   Negative: [1] SEVERE diarrhea (e.g., 7 or more times / day more than normal) AND [2] age > 60 years   Negative: [1] Constant abdominal pain AND [2] present > 2 hours   Negative: [1] Fever > 103 F (39.4 C) AND [2] not able to get the fever down using Fever Care Advice   Negative: [1] SEVERE diarrhea (e.g., 7 or more times / day more than normal) AND [2] present > 24 hours (1 day)    Protocols used: DIARRHEA-A-AH

## 2020-12-31 NOTE — TELEPHONE ENCOUNTER
----- Message from Pearl Nash sent at 12/31/2020  4:42 PM CST -----  Contact: patient/  [phone]  Katie Quevedo calling requesting to speak with the nurse regarding having diarrhea for 6 days   PATIENT STATES IT'S URGENT THAT SHE SPEAK WITH THE NURSE TODAY   Please advise

## 2021-01-01 ENCOUNTER — OFFICE VISIT (OUTPATIENT)
Dept: URGENT CARE | Facility: CLINIC | Age: 76
End: 2021-01-01
Payer: MEDICARE

## 2021-01-01 VITALS
HEART RATE: 101 BPM | DIASTOLIC BLOOD PRESSURE: 70 MMHG | WEIGHT: 101 LBS | BODY MASS INDEX: 18.58 KG/M2 | TEMPERATURE: 98 F | HEIGHT: 62 IN | RESPIRATION RATE: 16 BRPM | SYSTOLIC BLOOD PRESSURE: 107 MMHG | OXYGEN SATURATION: 98 %

## 2021-01-01 DIAGNOSIS — R19.7 DIARRHEA, UNSPECIFIED TYPE: Primary | ICD-10-CM

## 2021-01-01 LAB
CTP QC/QA: YES
GLUCOSE SERPL-MCNC: 82 MG/DL (ref 70–110)
POC ANION GAP: 16 MMOL/L
POC BUN: 17 MMOL/L
POC CHLORIDE: 99 MMOL/L
POC CREATININE: 1.4 MG/DL (ref 0.6–1.3)
POC HEMATOCRIT: 33 %PCV (ref 37–47)
POC HEMOGLOBIN: 11.2 G/DL (ref 12.5–16)
POC ICA: 1.18 MMOL/L
POC POTASSIUM: 3.7 MMOL/L
POC SODIUM: 137 MMOL/L
POC TCO2: 27 MMOL/L
SARS-COV-2 RDRP RESP QL NAA+PROBE: NEGATIVE

## 2021-01-01 PROCEDURE — 99214 OFFICE O/P EST MOD 30 MIN: CPT | Mod: S$GLB,,, | Performed by: PHYSICIAN ASSISTANT

## 2021-01-01 PROCEDURE — U0002 COVID-19 LAB TEST NON-CDC: HCPCS | Mod: QW,S$GLB,, | Performed by: PHYSICIAN ASSISTANT

## 2021-01-01 PROCEDURE — 80047 POCT CHEMISTRY PANEL: ICD-10-PCS | Mod: QW,S$GLB,, | Performed by: PHYSICIAN ASSISTANT

## 2021-01-01 PROCEDURE — U0002: ICD-10-PCS | Mod: QW,S$GLB,, | Performed by: PHYSICIAN ASSISTANT

## 2021-01-01 PROCEDURE — 99214 PR OFFICE/OUTPT VISIT, EST, LEVL IV, 30-39 MIN: ICD-10-PCS | Mod: S$GLB,,, | Performed by: PHYSICIAN ASSISTANT

## 2021-01-01 PROCEDURE — 80047 BASIC METABLC PNL IONIZED CA: CPT | Mod: QW,S$GLB,, | Performed by: PHYSICIAN ASSISTANT

## 2021-01-01 RX ORDER — DIPHENOXYLATE HYDROCHLORIDE AND ATROPINE SULFATE 2.5; .025 MG/1; MG/1
1 TABLET ORAL 3 TIMES DAILY PRN
Qty: 20 TABLET | Refills: 0 | Status: SHIPPED | OUTPATIENT
Start: 2021-01-01 | End: 2021-01-06

## 2021-01-04 ENCOUNTER — TELEPHONE (OUTPATIENT)
Dept: URGENT CARE | Facility: CLINIC | Age: 76
End: 2021-01-04

## 2021-01-07 ENCOUNTER — TELEPHONE (OUTPATIENT)
Dept: FAMILY MEDICINE | Facility: CLINIC | Age: 76
End: 2021-01-07

## 2021-01-09 ENCOUNTER — IMMUNIZATION (OUTPATIENT)
Dept: INTERNAL MEDICINE | Facility: CLINIC | Age: 76
End: 2021-01-09
Payer: MEDICARE

## 2021-01-09 DIAGNOSIS — Z23 NEED FOR VACCINATION: ICD-10-CM

## 2021-01-09 PROCEDURE — 91300 COVID-19, MRNA, LNP-S, PF, 30 MCG/0.3 ML DOSE VACCINE: CPT | Mod: PBBFAC | Performed by: FAMILY MEDICINE

## 2021-01-19 DIAGNOSIS — M54.2 CHRONIC NECK PAIN: ICD-10-CM

## 2021-01-19 DIAGNOSIS — M54.41 CHRONIC MIDLINE LOW BACK PAIN WITH RIGHT-SIDED SCIATICA: ICD-10-CM

## 2021-01-19 DIAGNOSIS — G89.29 CHRONIC MIDLINE LOW BACK PAIN WITH RIGHT-SIDED SCIATICA: ICD-10-CM

## 2021-01-19 DIAGNOSIS — G89.29 CHRONIC NECK PAIN: ICD-10-CM

## 2021-01-19 DIAGNOSIS — M16.0 PRIMARY OSTEOARTHRITIS OF BOTH HIPS: ICD-10-CM

## 2021-01-19 RX ORDER — HYDROCODONE BITARTRATE AND ACETAMINOPHEN 10; 325 MG/1; MG/1
1 TABLET ORAL 3 TIMES DAILY PRN
Qty: 90 TABLET | Refills: 0 | Status: SHIPPED | OUTPATIENT
Start: 2021-01-19 | End: 2021-02-22 | Stop reason: SDUPTHER

## 2021-01-29 ENCOUNTER — HOSPITAL ENCOUNTER (EMERGENCY)
Facility: HOSPITAL | Age: 76
Discharge: HOME | End: 2021-01-29
Attending: EMERGENCY MEDICINE
Payer: MEDICARE

## 2021-01-29 VITALS
HEART RATE: 95 BPM | HEIGHT: 62 IN | SYSTOLIC BLOOD PRESSURE: 113 MMHG | RESPIRATION RATE: 36 BRPM | WEIGHT: 101 LBS | BODY MASS INDEX: 18.58 KG/M2 | DIASTOLIC BLOOD PRESSURE: 61 MMHG | OXYGEN SATURATION: 100 % | TEMPERATURE: 99 F

## 2021-01-29 DIAGNOSIS — R06.02 SOB (SHORTNESS OF BREATH): ICD-10-CM

## 2021-01-29 DIAGNOSIS — J90 PLEURAL EFFUSION: Primary | ICD-10-CM

## 2021-01-29 DIAGNOSIS — R05.9 COUGH: ICD-10-CM

## 2021-01-29 LAB
ALBUMIN SERPL BCP-MCNC: 3.4 G/DL (ref 3.5–5.2)
ALP SERPL-CCNC: 449 U/L (ref 55–135)
ALT SERPL W/O P-5'-P-CCNC: 7 U/L (ref 10–44)
ANION GAP SERPL CALC-SCNC: 8 MMOL/L (ref 8–16)
AST SERPL-CCNC: 16 U/L (ref 10–40)
BASOPHILS # BLD AUTO: 0.09 K/UL (ref 0–0.2)
BASOPHILS NFR BLD: 1.4 % (ref 0–1.9)
BILIRUB SERPL-MCNC: 1.1 MG/DL (ref 0.1–1)
BNP SERPL-MCNC: 423 PG/ML (ref 0–99)
BUN SERPL-MCNC: 10 MG/DL (ref 8–23)
CALCIUM SERPL-MCNC: 8.8 MG/DL (ref 8.7–10.5)
CHLORIDE SERPL-SCNC: 101 MMOL/L (ref 95–110)
CO2 SERPL-SCNC: 26 MMOL/L (ref 23–29)
CREAT SERPL-MCNC: 1.8 MG/DL (ref 0.5–1.4)
DIFFERENTIAL METHOD: ABNORMAL
EOSINOPHIL # BLD AUTO: 0.3 K/UL (ref 0–0.5)
EOSINOPHIL NFR BLD: 5.1 % (ref 0–8)
ERYTHROCYTE [DISTWIDTH] IN BLOOD BY AUTOMATED COUNT: 15.6 % (ref 11.5–14.5)
EST. GFR  (AFRICAN AMERICAN): 31 ML/MIN/1.73 M^2
EST. GFR  (NON AFRICAN AMERICAN): 27 ML/MIN/1.73 M^2
GLUCOSE SERPL-MCNC: 105 MG/DL (ref 70–110)
HCT VFR BLD AUTO: 35.9 % (ref 37–48.5)
HGB BLD-MCNC: 10.8 G/DL (ref 12–16)
IMM GRANULOCYTES # BLD AUTO: 0.03 K/UL (ref 0–0.04)
IMM GRANULOCYTES NFR BLD AUTO: 0.5 % (ref 0–0.5)
LIPASE SERPL-CCNC: 12 U/L (ref 4–60)
LYMPHOCYTES # BLD AUTO: 1.3 K/UL (ref 1–4.8)
LYMPHOCYTES NFR BLD: 20.4 % (ref 18–48)
MAGNESIUM SERPL-MCNC: 1.9 MG/DL (ref 1.6–2.6)
MCH RBC QN AUTO: 31.4 PG (ref 27–31)
MCHC RBC AUTO-ENTMCNC: 30.1 G/DL (ref 32–36)
MCV RBC AUTO: 104 FL (ref 82–98)
MONOCYTES # BLD AUTO: 0.7 K/UL (ref 0.3–1)
MONOCYTES NFR BLD: 11.2 % (ref 4–15)
NEUTROPHILS # BLD AUTO: 3.9 K/UL (ref 1.8–7.7)
NEUTROPHILS NFR BLD: 61.4 % (ref 38–73)
NRBC BLD-RTO: 0 /100 WBC
PLATELET # BLD AUTO: 170 K/UL (ref 150–350)
PLATELET BLD QL SMEAR: ABNORMAL
PMV BLD AUTO: 11.5 FL (ref 9.2–12.9)
POTASSIUM SERPL-SCNC: 3.5 MMOL/L (ref 3.5–5.1)
PROT SERPL-MCNC: 8 G/DL (ref 6–8.4)
RBC # BLD AUTO: 3.44 M/UL (ref 4–5.4)
SARS-COV-2 RDRP RESP QL NAA+PROBE: NEGATIVE
SODIUM SERPL-SCNC: 135 MMOL/L (ref 136–145)
TROPONIN I SERPL DL<=0.01 NG/ML-MCNC: <0.006 NG/ML (ref 0–0.03)
WBC # BLD AUTO: 6.27 K/UL (ref 3.9–12.7)

## 2021-01-29 PROCEDURE — 99285 EMERGENCY DEPT VISIT HI MDM: CPT | Mod: 25

## 2021-01-29 PROCEDURE — 84484 ASSAY OF TROPONIN QUANT: CPT

## 2021-01-29 PROCEDURE — 80053 COMPREHEN METABOLIC PANEL: CPT

## 2021-01-29 PROCEDURE — 83690 ASSAY OF LIPASE: CPT

## 2021-01-29 PROCEDURE — 93010 ELECTROCARDIOGRAM REPORT: CPT | Mod: ,,, | Performed by: INTERNAL MEDICINE

## 2021-01-29 PROCEDURE — 83735 ASSAY OF MAGNESIUM: CPT

## 2021-01-29 PROCEDURE — 93010 EKG 12-LEAD: ICD-10-PCS | Mod: ,,, | Performed by: INTERNAL MEDICINE

## 2021-01-29 PROCEDURE — 93005 ELECTROCARDIOGRAM TRACING: CPT

## 2021-01-29 PROCEDURE — 85025 COMPLETE CBC W/AUTO DIFF WBC: CPT

## 2021-01-29 PROCEDURE — 83880 ASSAY OF NATRIURETIC PEPTIDE: CPT

## 2021-01-29 PROCEDURE — U0002 COVID-19 LAB TEST NON-CDC: HCPCS

## 2021-01-29 RX ORDER — BENZONATATE 100 MG/1
100 CAPSULE ORAL 3 TIMES DAILY PRN
Qty: 20 CAPSULE | Refills: 0 | Status: SHIPPED | OUTPATIENT
Start: 2021-01-29 | End: 2021-02-08

## 2021-01-29 RX ORDER — BENZONATATE 100 MG/1
100 CAPSULE ORAL 3 TIMES DAILY PRN
Qty: 20 CAPSULE | Refills: 0 | Status: SHIPPED | OUTPATIENT
Start: 2021-01-29 | End: 2021-01-29 | Stop reason: SDUPTHER

## 2021-01-30 ENCOUNTER — IMMUNIZATION (OUTPATIENT)
Dept: INTERNAL MEDICINE | Facility: CLINIC | Age: 76
End: 2021-01-30
Payer: MEDICARE

## 2021-01-30 DIAGNOSIS — Z23 NEED FOR VACCINATION: Primary | ICD-10-CM

## 2021-01-30 PROCEDURE — 91300 COVID-19, MRNA, LNP-S, PF, 30 MCG/0.3 ML DOSE VACCINE: CPT | Mod: PBBFAC | Performed by: FAMILY MEDICINE

## 2021-01-30 PROCEDURE — 0002A COVID-19, MRNA, LNP-S, PF, 30 MCG/0.3 ML DOSE VACCINE: CPT | Mod: PBBFAC | Performed by: FAMILY MEDICINE

## 2021-02-10 RX ORDER — ESCITALOPRAM OXALATE 10 MG/1
10 TABLET ORAL DAILY
Qty: 30 TABLET | Refills: 11 | Status: CANCELLED | OUTPATIENT
Start: 2021-02-10 | End: 2022-02-10

## 2021-02-11 DIAGNOSIS — F41.9 ANXIETY: ICD-10-CM

## 2021-02-11 RX ORDER — CLONAZEPAM 1 MG/1
1 TABLET ORAL DAILY PRN
Qty: 20 TABLET | Refills: 1 | Status: SHIPPED | OUTPATIENT
Start: 2021-02-11 | End: 2021-03-16 | Stop reason: SDUPTHER

## 2021-02-11 RX ORDER — ESCITALOPRAM OXALATE 10 MG/1
10 TABLET ORAL DAILY
Qty: 30 TABLET | Refills: 11 | Status: SHIPPED | OUTPATIENT
Start: 2021-02-11 | End: 2022-02-08

## 2021-02-12 NOTE — ASSESSMENT & PLAN NOTE
Stable at this time, follow.    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

## 2021-02-22 DIAGNOSIS — M54.2 CHRONIC NECK PAIN: ICD-10-CM

## 2021-02-22 DIAGNOSIS — G89.29 CHRONIC NECK PAIN: ICD-10-CM

## 2021-02-22 DIAGNOSIS — M16.0 PRIMARY OSTEOARTHRITIS OF BOTH HIPS: ICD-10-CM

## 2021-02-22 DIAGNOSIS — G89.29 CHRONIC MIDLINE LOW BACK PAIN WITH RIGHT-SIDED SCIATICA: ICD-10-CM

## 2021-02-22 DIAGNOSIS — M54.41 CHRONIC MIDLINE LOW BACK PAIN WITH RIGHT-SIDED SCIATICA: ICD-10-CM

## 2021-02-22 RX ORDER — HYDROCODONE BITARTRATE AND ACETAMINOPHEN 10; 325 MG/1; MG/1
1 TABLET ORAL 3 TIMES DAILY PRN
Qty: 90 TABLET | Refills: 0 | Status: SHIPPED | OUTPATIENT
Start: 2021-02-23 | End: 2021-03-25 | Stop reason: SDUPTHER

## 2021-02-25 ENCOUNTER — TELEPHONE (OUTPATIENT)
Dept: PHYSICAL MEDICINE AND REHAB | Facility: CLINIC | Age: 76
End: 2021-02-25

## 2021-02-25 ENCOUNTER — HOSPITAL ENCOUNTER (EMERGENCY)
Facility: HOSPITAL | Age: 76
Discharge: HOME | End: 2021-02-25
Attending: EMERGENCY MEDICINE
Payer: MEDICARE

## 2021-02-25 VITALS
HEART RATE: 99 BPM | OXYGEN SATURATION: 97 % | TEMPERATURE: 98 F | BODY MASS INDEX: 18.22 KG/M2 | SYSTOLIC BLOOD PRESSURE: 110 MMHG | HEIGHT: 62 IN | DIASTOLIC BLOOD PRESSURE: 62 MMHG | WEIGHT: 99 LBS | RESPIRATION RATE: 18 BRPM
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
## 2021-02-25 DIAGNOSIS — S39.012A STRAIN OF LUMBAR REGION, INITIAL ENCOUNTER: Primary | ICD-10-CM

## 2021-02-25 DIAGNOSIS — G89.29 CHRONIC MIDLINE LOW BACK PAIN WITH RIGHT-SIDED SCIATICA: ICD-10-CM

## 2021-02-25 DIAGNOSIS — G89.29 CHRONIC NECK PAIN: ICD-10-CM

## 2021-02-25 DIAGNOSIS — M54.41 CHRONIC MIDLINE LOW BACK PAIN WITH RIGHT-SIDED SCIATICA: ICD-10-CM

## 2021-02-25 DIAGNOSIS — M54.2 CHRONIC NECK PAIN: ICD-10-CM

## 2021-02-25 DIAGNOSIS — M16.0 PRIMARY OSTEOARTHRITIS OF BOTH HIPS: ICD-10-CM

## 2021-02-25 PROCEDURE — 63600175 PHARM REV CODE 636 W HCPCS: Performed by: EMERGENCY MEDICINE

## 2021-02-25 PROCEDURE — 96372 THER/PROPH/DIAG INJ SC/IM: CPT

## 2021-02-25 PROCEDURE — 25000003 PHARM REV CODE 250: Performed by: EMERGENCY MEDICINE

## 2021-02-25 PROCEDURE — 99284 EMERGENCY DEPT VISIT MOD MDM: CPT | Mod: 25

## 2021-02-25 RX ORDER — ONDANSETRON 4 MG/1
4 TABLET, ORALLY DISINTEGRATING ORAL
Status: COMPLETED | OUTPATIENT
Start: 2021-02-25 | End: 2021-02-25

## 2021-02-25 RX ORDER — MORPHINE SULFATE 2 MG/ML
3 INJECTION, SOLUTION INTRAMUSCULAR; INTRAVENOUS
Status: COMPLETED | OUTPATIENT
Start: 2021-02-25 | End: 2021-02-25

## 2021-02-25 RX ORDER — HYDROCODONE BITARTRATE AND ACETAMINOPHEN 10; 325 MG/1; MG/1
1 TABLET ORAL 3 TIMES DAILY PRN
Qty: 90 TABLET | Refills: 0 | Status: CANCELLED | OUTPATIENT
Start: 2021-02-25 | End: 2021-03-27

## 2021-02-25 RX ADMIN — ONDANSETRON 4 MG: 4 TABLET, ORALLY DISINTEGRATING ORAL at 10:02

## 2021-02-25 RX ADMIN — MORPHINE SULFATE 3 MG: 2 INJECTION, SOLUTION INTRAMUSCULAR; INTRAVENOUS at 10:02

## 2021-03-16 ENCOUNTER — OFFICE VISIT (OUTPATIENT)
Dept: FAMILY MEDICINE | Facility: CLINIC | Age: 76
End: 2021-03-16
Payer: MEDICARE

## 2021-03-16 VITALS
DIASTOLIC BLOOD PRESSURE: 80 MMHG | WEIGHT: 92.56 LBS | HEART RATE: 100 BPM | HEIGHT: 62 IN | SYSTOLIC BLOOD PRESSURE: 142 MMHG | BODY MASS INDEX: 17.03 KG/M2 | OXYGEN SATURATION: 95 %

## 2021-03-16 DIAGNOSIS — F41.9 ANXIETY: ICD-10-CM

## 2021-03-16 DIAGNOSIS — Z99.2 ESRD (END STAGE RENAL DISEASE) ON DIALYSIS: ICD-10-CM

## 2021-03-16 DIAGNOSIS — F41.9 ANXIETY: Primary | ICD-10-CM

## 2021-03-16 DIAGNOSIS — J43.2 CENTRILOBULAR EMPHYSEMA: ICD-10-CM

## 2021-03-16 DIAGNOSIS — I50.32 CHRONIC DIASTOLIC HEART FAILURE: ICD-10-CM

## 2021-03-16 DIAGNOSIS — N18.6 ESRD (END STAGE RENAL DISEASE) ON DIALYSIS: ICD-10-CM

## 2021-03-16 PROCEDURE — 1126F AMNT PAIN NOTED NONE PRSNT: CPT | Mod: S$GLB,,, | Performed by: FAMILY MEDICINE

## 2021-03-16 PROCEDURE — 1159F MED LIST DOCD IN RCRD: CPT | Mod: S$GLB,,, | Performed by: FAMILY MEDICINE

## 2021-03-16 PROCEDURE — 1101F PR PT FALLS ASSESS DOC 0-1 FALLS W/OUT INJ PAST YR: ICD-10-PCS | Mod: CPTII,S$GLB,, | Performed by: FAMILY MEDICINE

## 2021-03-16 PROCEDURE — 99499 RISK ADDL DX/OHS AUDIT: ICD-10-PCS | Mod: S$GLB,,, | Performed by: FAMILY MEDICINE

## 2021-03-16 PROCEDURE — 99499 UNLISTED E&M SERVICE: CPT | Mod: S$GLB,,, | Performed by: FAMILY MEDICINE

## 2021-03-16 PROCEDURE — 99999 PR PBB SHADOW E&M-EST. PATIENT-LVL IV: CPT | Mod: PBBFAC,,, | Performed by: FAMILY MEDICINE

## 2021-03-16 PROCEDURE — 1126F PR PAIN SEVERITY QUANTIFIED, NO PAIN PRESENT: ICD-10-PCS | Mod: S$GLB,,, | Performed by: FAMILY MEDICINE

## 2021-03-16 PROCEDURE — 99214 OFFICE O/P EST MOD 30 MIN: CPT | Mod: S$GLB,,, | Performed by: FAMILY MEDICINE

## 2021-03-16 PROCEDURE — 3288F FALL RISK ASSESSMENT DOCD: CPT | Mod: CPTII,S$GLB,, | Performed by: FAMILY MEDICINE

## 2021-03-16 PROCEDURE — 99999 PR PBB SHADOW E&M-EST. PATIENT-LVL IV: ICD-10-PCS | Mod: PBBFAC,,, | Performed by: FAMILY MEDICINE

## 2021-03-16 PROCEDURE — 1101F PT FALLS ASSESS-DOCD LE1/YR: CPT | Mod: CPTII,S$GLB,, | Performed by: FAMILY MEDICINE

## 2021-03-16 PROCEDURE — 99214 PR OFFICE/OUTPT VISIT, EST, LEVL IV, 30-39 MIN: ICD-10-PCS | Mod: S$GLB,,, | Performed by: FAMILY MEDICINE

## 2021-03-16 PROCEDURE — 1159F PR MEDICATION LIST DOCUMENTED IN MEDICAL RECORD: ICD-10-PCS | Mod: S$GLB,,, | Performed by: FAMILY MEDICINE

## 2021-03-16 PROCEDURE — 3288F PR FALLS RISK ASSESSMENT DOCUMENTED: ICD-10-PCS | Mod: CPTII,S$GLB,, | Performed by: FAMILY MEDICINE

## 2021-03-16 RX ORDER — CLONAZEPAM 1 MG/1
1 TABLET ORAL DAILY PRN
Qty: 20 TABLET | Refills: 1 | Status: CANCELLED | OUTPATIENT
Start: 2021-03-16

## 2021-03-16 RX ORDER — CLONAZEPAM 1 MG/1
1 TABLET ORAL DAILY PRN
Qty: 20 TABLET | Refills: 1 | Status: SHIPPED | OUTPATIENT
Start: 2021-03-16 | End: 2022-02-08 | Stop reason: SDUPTHER

## 2021-03-25 DIAGNOSIS — G89.29 CHRONIC MIDLINE LOW BACK PAIN WITH RIGHT-SIDED SCIATICA: ICD-10-CM

## 2021-03-25 DIAGNOSIS — M16.0 PRIMARY OSTEOARTHRITIS OF BOTH HIPS: ICD-10-CM

## 2021-03-25 DIAGNOSIS — G89.29 CHRONIC NECK PAIN: ICD-10-CM

## 2021-03-25 DIAGNOSIS — M54.2 CHRONIC NECK PAIN: ICD-10-CM

## 2021-03-25 DIAGNOSIS — M54.41 CHRONIC MIDLINE LOW BACK PAIN WITH RIGHT-SIDED SCIATICA: ICD-10-CM

## 2021-03-25 RX ORDER — HYDROCODONE BITARTRATE AND ACETAMINOPHEN 10; 325 MG/1; MG/1
1 TABLET ORAL 3 TIMES DAILY PRN
Qty: 90 TABLET | Refills: 0 | Status: SHIPPED | OUTPATIENT
Start: 2021-03-27 | End: 2021-04-22 | Stop reason: SDUPTHER

## 2021-03-30 ENCOUNTER — OFFICE VISIT (OUTPATIENT)
Dept: FAMILY MEDICINE | Facility: CLINIC | Age: 76
End: 2021-03-30
Payer: MEDICARE

## 2021-03-30 VITALS
TEMPERATURE: 97 F | WEIGHT: 81.81 LBS | HEART RATE: 97 BPM | HEIGHT: 62 IN | BODY MASS INDEX: 15.06 KG/M2 | SYSTOLIC BLOOD PRESSURE: 121 MMHG | OXYGEN SATURATION: 99 % | DIASTOLIC BLOOD PRESSURE: 65 MMHG

## 2021-03-30 DIAGNOSIS — J44.9 CHRONIC OBSTRUCTIVE PULMONARY DISEASE, UNSPECIFIED COPD TYPE: ICD-10-CM

## 2021-03-30 DIAGNOSIS — J90 PLEURAL EFFUSION: ICD-10-CM

## 2021-03-30 DIAGNOSIS — Z00.00 ROUTINE GENERAL MEDICAL EXAMINATION AT A HEALTH CARE FACILITY: Primary | ICD-10-CM

## 2021-03-30 DIAGNOSIS — H04.129 DRY EYE: ICD-10-CM

## 2021-03-30 DIAGNOSIS — Z99.2 ESRD (END STAGE RENAL DISEASE) ON DIALYSIS: ICD-10-CM

## 2021-03-30 DIAGNOSIS — Z79.899 MEDICATION MANAGEMENT: ICD-10-CM

## 2021-03-30 DIAGNOSIS — N25.81 SECONDARY HYPERPARATHYROIDISM: ICD-10-CM

## 2021-03-30 DIAGNOSIS — J44.89 COPD (CHRONIC OBSTRUCTIVE PULMONARY DISEASE) WITH CHRONIC BRONCHITIS: ICD-10-CM

## 2021-03-30 DIAGNOSIS — I50.32 CHRONIC DIASTOLIC HEART FAILURE: ICD-10-CM

## 2021-03-30 DIAGNOSIS — C43.9 MALIGNANT MELANOMA, UNSPECIFIED SITE: ICD-10-CM

## 2021-03-30 DIAGNOSIS — N18.6 ESRD (END STAGE RENAL DISEASE) ON DIALYSIS: ICD-10-CM

## 2021-03-30 PROCEDURE — 99397 PER PM REEVAL EST PAT 65+ YR: CPT | Mod: S$GLB,,, | Performed by: FAMILY MEDICINE

## 2021-03-30 PROCEDURE — 99499 RISK ADDL DX/OHS AUDIT: ICD-10-PCS | Mod: S$GLB,,, | Performed by: FAMILY MEDICINE

## 2021-03-30 PROCEDURE — 99397 PR PREVENTIVE VISIT,EST,65 & OVER: ICD-10-PCS | Mod: S$GLB,,, | Performed by: FAMILY MEDICINE

## 2021-03-30 PROCEDURE — 3288F FALL RISK ASSESSMENT DOCD: CPT | Mod: CPTII,S$GLB,, | Performed by: FAMILY MEDICINE

## 2021-03-30 PROCEDURE — 3288F PR FALLS RISK ASSESSMENT DOCUMENTED: ICD-10-PCS | Mod: CPTII,S$GLB,, | Performed by: FAMILY MEDICINE

## 2021-03-30 PROCEDURE — 99999 PR PBB SHADOW E&M-EST. PATIENT-LVL V: CPT | Mod: PBBFAC,,, | Performed by: FAMILY MEDICINE

## 2021-03-30 PROCEDURE — 1101F PT FALLS ASSESS-DOCD LE1/YR: CPT | Mod: CPTII,S$GLB,, | Performed by: FAMILY MEDICINE

## 2021-03-30 PROCEDURE — 99999 PR PBB SHADOW E&M-EST. PATIENT-LVL V: ICD-10-PCS | Mod: PBBFAC,,, | Performed by: FAMILY MEDICINE

## 2021-03-30 PROCEDURE — 99499 UNLISTED E&M SERVICE: CPT | Mod: S$GLB,,, | Performed by: FAMILY MEDICINE

## 2021-03-30 PROCEDURE — 1101F PR PT FALLS ASSESS DOC 0-1 FALLS W/OUT INJ PAST YR: ICD-10-PCS | Mod: CPTII,S$GLB,, | Performed by: FAMILY MEDICINE

## 2021-03-30 RX ORDER — FLUTICASONE FUROATE, UMECLIDINIUM BROMIDE AND VILANTEROL TRIFENATATE 200; 62.5; 25 UG/1; UG/1; UG/1
1 POWDER RESPIRATORY (INHALATION) DAILY
Qty: 60 EACH | Refills: 11 | Status: ON HOLD | OUTPATIENT
Start: 2021-03-30 | End: 2022-04-08 | Stop reason: SDUPTHER

## 2021-03-30 RX ORDER — MIRTAZAPINE 15 MG/1
15 TABLET, FILM COATED ORAL NIGHTLY
Qty: 30 TABLET | Refills: 11 | Status: SHIPPED | OUTPATIENT
Start: 2021-03-30 | End: 2021-10-20 | Stop reason: SDUPTHER

## 2021-03-31 ENCOUNTER — TELEPHONE (OUTPATIENT)
Dept: FAMILY MEDICINE | Facility: CLINIC | Age: 76
End: 2021-03-31

## 2021-04-22 DIAGNOSIS — G89.29 CHRONIC NECK PAIN: ICD-10-CM

## 2021-04-22 DIAGNOSIS — M16.0 PRIMARY OSTEOARTHRITIS OF BOTH HIPS: ICD-10-CM

## 2021-04-22 DIAGNOSIS — G89.29 CHRONIC MIDLINE LOW BACK PAIN WITH RIGHT-SIDED SCIATICA: ICD-10-CM

## 2021-04-22 DIAGNOSIS — M54.41 CHRONIC MIDLINE LOW BACK PAIN WITH RIGHT-SIDED SCIATICA: ICD-10-CM

## 2021-04-22 DIAGNOSIS — M54.2 CHRONIC NECK PAIN: ICD-10-CM

## 2021-04-22 RX ORDER — HYDROCODONE BITARTRATE AND ACETAMINOPHEN 10; 325 MG/1; MG/1
1 TABLET ORAL 3 TIMES DAILY PRN
Qty: 90 TABLET | Refills: 0 | Status: SHIPPED | OUTPATIENT
Start: 2021-04-30 | End: 2021-05-26 | Stop reason: SDUPTHER

## 2021-05-05 ENCOUNTER — TELEPHONE (OUTPATIENT)
Dept: FAMILY MEDICINE | Facility: CLINIC | Age: 76
End: 2021-05-05

## 2021-05-06 ENCOUNTER — OFFICE VISIT (OUTPATIENT)
Dept: PHYSICAL MEDICINE AND REHAB | Facility: CLINIC | Age: 76
End: 2021-05-06
Payer: MEDICARE

## 2021-05-06 ENCOUNTER — HOSPITAL ENCOUNTER (OUTPATIENT)
Dept: RADIOLOGY | Facility: HOSPITAL | Age: 76
Discharge: HOME | End: 2021-05-06
Attending: PHYSICAL MEDICINE & REHABILITATION
Payer: MEDICARE

## 2021-05-06 VITALS
HEIGHT: 62 IN | HEART RATE: 92 BPM | BODY MASS INDEX: 17.03 KG/M2 | SYSTOLIC BLOOD PRESSURE: 130 MMHG | DIASTOLIC BLOOD PRESSURE: 57 MMHG | WEIGHT: 92.56 LBS

## 2021-05-06 DIAGNOSIS — G89.29 CHRONIC NECK PAIN: ICD-10-CM

## 2021-05-06 DIAGNOSIS — M54.2 CHRONIC NECK PAIN: ICD-10-CM

## 2021-05-06 DIAGNOSIS — G89.29 CHRONIC MIDLINE LOW BACK PAIN WITH RIGHT-SIDED SCIATICA: Primary | ICD-10-CM

## 2021-05-06 DIAGNOSIS — M16.0 PRIMARY OSTEOARTHRITIS OF BOTH HIPS: ICD-10-CM

## 2021-05-06 DIAGNOSIS — M54.41 CHRONIC MIDLINE LOW BACK PAIN WITH RIGHT-SIDED SCIATICA: Primary | ICD-10-CM

## 2021-05-06 DIAGNOSIS — M96.1 CERVICAL POST-LAMINECTOMY SYNDROME: ICD-10-CM

## 2021-05-06 DIAGNOSIS — Z79.891 CHRONICALLY ON OPIATE THERAPY: ICD-10-CM

## 2021-05-06 DIAGNOSIS — G89.29 CHRONIC RIGHT SHOULDER PAIN: ICD-10-CM

## 2021-05-06 DIAGNOSIS — M25.511 CHRONIC RIGHT SHOULDER PAIN: ICD-10-CM

## 2021-05-06 DIAGNOSIS — M17.0 PRIMARY OSTEOARTHRITIS OF BOTH KNEES: ICD-10-CM

## 2021-05-06 DIAGNOSIS — M75.51 SUBDELTOID BURSITIS, RIGHT: ICD-10-CM

## 2021-05-06 DIAGNOSIS — M96.1 LUMBAR POSTLAMINECTOMY SYNDROME: ICD-10-CM

## 2021-05-06 DIAGNOSIS — M47.26 OSTEOARTHRITIS OF SPINE WITH RADICULOPATHY, LUMBAR REGION: ICD-10-CM

## 2021-05-06 PROCEDURE — 72050 X-RAY EXAM NECK SPINE 4/5VWS: CPT | Mod: TC

## 2021-05-06 PROCEDURE — 99214 PR OFFICE/OUTPT VISIT, EST, LEVL IV, 30-39 MIN: ICD-10-PCS | Mod: S$GLB,,, | Performed by: PHYSICAL MEDICINE & REHABILITATION

## 2021-05-06 PROCEDURE — 72050 X-RAY EXAM NECK SPINE 4/5VWS: CPT | Mod: 26,,, | Performed by: RADIOLOGY

## 2021-05-06 PROCEDURE — 1125F AMNT PAIN NOTED PAIN PRSNT: CPT | Mod: S$GLB,,, | Performed by: PHYSICAL MEDICINE & REHABILITATION

## 2021-05-06 PROCEDURE — 1159F MED LIST DOCD IN RCRD: CPT | Mod: S$GLB,,, | Performed by: PHYSICAL MEDICINE & REHABILITATION

## 2021-05-06 PROCEDURE — 1159F PR MEDICATION LIST DOCUMENTED IN MEDICAL RECORD: ICD-10-PCS | Mod: S$GLB,,, | Performed by: PHYSICAL MEDICINE & REHABILITATION

## 2021-05-06 PROCEDURE — 99999 PR PBB SHADOW E&M-EST. PATIENT-LVL III: ICD-10-PCS | Mod: PBBFAC,,, | Performed by: PHYSICAL MEDICINE & REHABILITATION

## 2021-05-06 PROCEDURE — 72050 XR CERVICAL SPINE AP LAT WITH FLEX EXTEN: ICD-10-PCS | Mod: 26,,, | Performed by: RADIOLOGY

## 2021-05-06 PROCEDURE — 1125F PR PAIN SEVERITY QUANTIFIED, PAIN PRESENT: ICD-10-PCS | Mod: S$GLB,,, | Performed by: PHYSICAL MEDICINE & REHABILITATION

## 2021-05-06 PROCEDURE — 99214 OFFICE O/P EST MOD 30 MIN: CPT | Mod: S$GLB,,, | Performed by: PHYSICAL MEDICINE & REHABILITATION

## 2021-05-06 PROCEDURE — 99999 PR PBB SHADOW E&M-EST. PATIENT-LVL III: CPT | Mod: PBBFAC,,, | Performed by: PHYSICAL MEDICINE & REHABILITATION

## 2021-05-06 RX ORDER — PREGABALIN 50 MG/1
50 CAPSULE ORAL 2 TIMES DAILY
Qty: 90 CAPSULE | Refills: 1 | Status: SHIPPED | OUTPATIENT
Start: 2021-05-06 | End: 2021-10-28 | Stop reason: SINTOL

## 2021-05-26 DIAGNOSIS — M54.2 CHRONIC NECK PAIN: ICD-10-CM

## 2021-05-26 DIAGNOSIS — G89.29 CHRONIC NECK PAIN: ICD-10-CM

## 2021-05-26 DIAGNOSIS — M54.41 CHRONIC MIDLINE LOW BACK PAIN WITH RIGHT-SIDED SCIATICA: ICD-10-CM

## 2021-05-26 DIAGNOSIS — M16.0 PRIMARY OSTEOARTHRITIS OF BOTH HIPS: ICD-10-CM

## 2021-05-26 DIAGNOSIS — G89.29 CHRONIC MIDLINE LOW BACK PAIN WITH RIGHT-SIDED SCIATICA: ICD-10-CM

## 2021-05-26 RX ORDER — HYDROCODONE BITARTRATE AND ACETAMINOPHEN 10; 325 MG/1; MG/1
1 TABLET ORAL 3 TIMES DAILY PRN
Qty: 90 TABLET | Refills: 0 | Status: SHIPPED | OUTPATIENT
Start: 2021-05-29 | End: 2021-05-31 | Stop reason: SDUPTHER

## 2021-05-31 DIAGNOSIS — G89.29 CHRONIC NECK PAIN: ICD-10-CM

## 2021-05-31 DIAGNOSIS — G89.29 CHRONIC MIDLINE LOW BACK PAIN WITH RIGHT-SIDED SCIATICA: ICD-10-CM

## 2021-05-31 DIAGNOSIS — M54.41 CHRONIC MIDLINE LOW BACK PAIN WITH RIGHT-SIDED SCIATICA: ICD-10-CM

## 2021-05-31 DIAGNOSIS — M16.0 PRIMARY OSTEOARTHRITIS OF BOTH HIPS: ICD-10-CM

## 2021-05-31 DIAGNOSIS — M54.2 CHRONIC NECK PAIN: ICD-10-CM

## 2021-05-31 RX ORDER — HYDROCODONE BITARTRATE AND ACETAMINOPHEN 10; 325 MG/1; MG/1
1 TABLET ORAL 3 TIMES DAILY PRN
Qty: 90 TABLET | Refills: 0 | Status: SHIPPED | OUTPATIENT
Start: 2021-05-31 | End: 2021-06-03 | Stop reason: SDUPTHER

## 2021-06-01 ENCOUNTER — TELEPHONE (OUTPATIENT)
Dept: PHYSICAL MEDICINE AND REHAB | Facility: CLINIC | Age: 76
End: 2021-06-01

## 2021-06-02 ENCOUNTER — PATIENT MESSAGE (OUTPATIENT)
Dept: PHYSICAL MEDICINE AND REHAB | Facility: CLINIC | Age: 76
End: 2021-06-02

## 2021-06-03 ENCOUNTER — PATIENT MESSAGE (OUTPATIENT)
Dept: PHYSICAL MEDICINE AND REHAB | Facility: CLINIC | Age: 76
End: 2021-06-03

## 2021-06-03 ENCOUNTER — TELEPHONE (OUTPATIENT)
Dept: PHYSICAL MEDICINE AND REHAB | Facility: CLINIC | Age: 76
End: 2021-06-03

## 2021-06-03 DIAGNOSIS — G89.29 CHRONIC MIDLINE LOW BACK PAIN WITH RIGHT-SIDED SCIATICA: ICD-10-CM

## 2021-06-03 DIAGNOSIS — M54.2 CHRONIC NECK PAIN: ICD-10-CM

## 2021-06-03 DIAGNOSIS — M16.0 PRIMARY OSTEOARTHRITIS OF BOTH HIPS: ICD-10-CM

## 2021-06-03 DIAGNOSIS — G89.29 CHRONIC NECK PAIN: ICD-10-CM

## 2021-06-03 DIAGNOSIS — M54.41 CHRONIC MIDLINE LOW BACK PAIN WITH RIGHT-SIDED SCIATICA: ICD-10-CM

## 2021-06-03 RX ORDER — HYDROCODONE BITARTRATE AND ACETAMINOPHEN 10; 325 MG/1; MG/1
1 TABLET ORAL 3 TIMES DAILY PRN
Qty: 90 TABLET | Refills: 0 | Status: SHIPPED | OUTPATIENT
Start: 2021-06-03 | End: 2021-06-29 | Stop reason: SDUPTHER

## 2021-06-07 ENCOUNTER — PES CALL (OUTPATIENT)
Dept: ADMINISTRATIVE | Facility: CLINIC | Age: 76
End: 2021-06-07

## 2021-06-29 DIAGNOSIS — M54.2 CHRONIC NECK PAIN: ICD-10-CM

## 2021-06-29 DIAGNOSIS — G89.29 CHRONIC NECK PAIN: ICD-10-CM

## 2021-06-29 DIAGNOSIS — G89.29 CHRONIC MIDLINE LOW BACK PAIN WITH RIGHT-SIDED SCIATICA: ICD-10-CM

## 2021-06-29 DIAGNOSIS — M54.41 CHRONIC MIDLINE LOW BACK PAIN WITH RIGHT-SIDED SCIATICA: ICD-10-CM

## 2021-06-29 DIAGNOSIS — M16.0 PRIMARY OSTEOARTHRITIS OF BOTH HIPS: ICD-10-CM

## 2021-06-29 RX ORDER — HYDROCODONE BITARTRATE AND ACETAMINOPHEN 10; 325 MG/1; MG/1
1 TABLET ORAL 3 TIMES DAILY PRN
Qty: 90 TABLET | Refills: 0 | Status: SHIPPED | OUTPATIENT
Start: 2021-07-03 | End: 2021-08-02 | Stop reason: SDUPTHER

## 2021-07-06 ENCOUNTER — TELEPHONE (OUTPATIENT)
Dept: PULMONOLOGY | Facility: CLINIC | Age: 76
End: 2021-07-06

## 2021-07-13 ENCOUNTER — TELEPHONE (OUTPATIENT)
Dept: PULMONOLOGY | Facility: CLINIC | Age: 76
End: 2021-07-13

## 2021-07-15 ENCOUNTER — TELEPHONE (OUTPATIENT)
Dept: PULMONOLOGY | Facility: CLINIC | Age: 76
End: 2021-07-15

## 2021-07-15 DIAGNOSIS — R06.02 SOB (SHORTNESS OF BREATH): Primary | ICD-10-CM

## 2021-08-02 DIAGNOSIS — M54.2 CHRONIC NECK PAIN: ICD-10-CM

## 2021-08-02 DIAGNOSIS — G89.29 CHRONIC NECK PAIN: ICD-10-CM

## 2021-08-02 DIAGNOSIS — G89.29 CHRONIC MIDLINE LOW BACK PAIN WITH RIGHT-SIDED SCIATICA: ICD-10-CM

## 2021-08-02 DIAGNOSIS — M54.41 CHRONIC MIDLINE LOW BACK PAIN WITH RIGHT-SIDED SCIATICA: ICD-10-CM

## 2021-08-02 DIAGNOSIS — M16.0 PRIMARY OSTEOARTHRITIS OF BOTH HIPS: ICD-10-CM

## 2021-08-02 RX ORDER — HYDROCODONE BITARTRATE AND ACETAMINOPHEN 10; 325 MG/1; MG/1
1 TABLET ORAL 3 TIMES DAILY PRN
Qty: 90 TABLET | Refills: 0 | Status: SHIPPED | OUTPATIENT
Start: 2021-08-02 | End: 2021-09-08 | Stop reason: SDUPTHER

## 2021-08-06 ENCOUNTER — TELEPHONE (OUTPATIENT)
Dept: PULMONOLOGY | Facility: CLINIC | Age: 76
End: 2021-08-06

## 2021-08-06 DIAGNOSIS — R06.02 SHORTNESS OF BREATH: ICD-10-CM

## 2021-08-10 ENCOUNTER — OFFICE VISIT (OUTPATIENT)
Dept: PULMONOLOGY | Facility: CLINIC | Age: 76
End: 2021-08-10
Payer: MEDICARE

## 2021-08-10 ENCOUNTER — PATIENT OUTREACH (OUTPATIENT)
Dept: ADMINISTRATIVE | Facility: OTHER | Age: 76
End: 2021-08-10

## 2021-08-10 VITALS
SYSTOLIC BLOOD PRESSURE: 120 MMHG | DIASTOLIC BLOOD PRESSURE: 62 MMHG | HEART RATE: 95 BPM | BODY MASS INDEX: 16.87 KG/M2 | HEIGHT: 62 IN | WEIGHT: 91.69 LBS | OXYGEN SATURATION: 92 %

## 2021-08-10 DIAGNOSIS — J44.9 PULMONARY CACHEXIA DUE TO COPD: ICD-10-CM

## 2021-08-10 DIAGNOSIS — R93.89 ABNORMAL CHEST CT: ICD-10-CM

## 2021-08-10 DIAGNOSIS — J96.11 CHRONIC RESPIRATORY FAILURE WITH HYPOXIA, ON HOME O2 THERAPY: Chronic | ICD-10-CM

## 2021-08-10 DIAGNOSIS — Z99.2 ESRD (END STAGE RENAL DISEASE) ON DIALYSIS: Chronic | ICD-10-CM

## 2021-08-10 DIAGNOSIS — N18.6 ESRD (END STAGE RENAL DISEASE) ON DIALYSIS: Chronic | ICD-10-CM

## 2021-08-10 DIAGNOSIS — R64 PULMONARY CACHEXIA DUE TO COPD: ICD-10-CM

## 2021-08-10 DIAGNOSIS — J44.1 COPD EXACERBATION: Primary | ICD-10-CM

## 2021-08-10 DIAGNOSIS — Z99.81 CHRONIC RESPIRATORY FAILURE WITH HYPOXIA, ON HOME O2 THERAPY: Chronic | ICD-10-CM

## 2021-08-10 DIAGNOSIS — J90 PLEURAL EFFUSION, LEFT: ICD-10-CM

## 2021-08-10 PROCEDURE — 3078F PR MOST RECENT DIASTOLIC BLOOD PRESSURE < 80 MM HG: ICD-10-PCS | Mod: CPTII,S$GLB,, | Performed by: INTERNAL MEDICINE

## 2021-08-10 PROCEDURE — 1101F PT FALLS ASSESS-DOCD LE1/YR: CPT | Mod: CPTII,S$GLB,, | Performed by: INTERNAL MEDICINE

## 2021-08-10 PROCEDURE — 1159F PR MEDICATION LIST DOCUMENTED IN MEDICAL RECORD: ICD-10-PCS | Mod: CPTII,S$GLB,, | Performed by: INTERNAL MEDICINE

## 2021-08-10 PROCEDURE — 99499 UNLISTED E&M SERVICE: CPT | Mod: HCNC,S$GLB,, | Performed by: INTERNAL MEDICINE

## 2021-08-10 PROCEDURE — 3288F FALL RISK ASSESSMENT DOCD: CPT | Mod: CPTII,S$GLB,, | Performed by: INTERNAL MEDICINE

## 2021-08-10 PROCEDURE — 99215 OFFICE O/P EST HI 40 MIN: CPT | Mod: S$GLB,,, | Performed by: INTERNAL MEDICINE

## 2021-08-10 PROCEDURE — 1159F MED LIST DOCD IN RCRD: CPT | Mod: CPTII,S$GLB,, | Performed by: INTERNAL MEDICINE

## 2021-08-10 PROCEDURE — 1101F PR PT FALLS ASSESS DOC 0-1 FALLS W/OUT INJ PAST YR: ICD-10-PCS | Mod: CPTII,S$GLB,, | Performed by: INTERNAL MEDICINE

## 2021-08-10 PROCEDURE — 3078F DIAST BP <80 MM HG: CPT | Mod: CPTII,S$GLB,, | Performed by: INTERNAL MEDICINE

## 2021-08-10 PROCEDURE — 99999 PR PBB SHADOW E&M-EST. PATIENT-LVL IV: CPT | Mod: PBBFAC,,, | Performed by: INTERNAL MEDICINE

## 2021-08-10 PROCEDURE — 1126F AMNT PAIN NOTED NONE PRSNT: CPT | Mod: CPTII,S$GLB,, | Performed by: INTERNAL MEDICINE

## 2021-08-10 PROCEDURE — 99215 PR OFFICE/OUTPT VISIT, EST, LEVL V, 40-54 MIN: ICD-10-PCS | Mod: S$GLB,,, | Performed by: INTERNAL MEDICINE

## 2021-08-10 PROCEDURE — 3074F PR MOST RECENT SYSTOLIC BLOOD PRESSURE < 130 MM HG: ICD-10-PCS | Mod: CPTII,S$GLB,, | Performed by: INTERNAL MEDICINE

## 2021-08-10 PROCEDURE — 1126F PR PAIN SEVERITY QUANTIFIED, NO PAIN PRESENT: ICD-10-PCS | Mod: CPTII,S$GLB,, | Performed by: INTERNAL MEDICINE

## 2021-08-10 PROCEDURE — 3288F PR FALLS RISK ASSESSMENT DOCUMENTED: ICD-10-PCS | Mod: CPTII,S$GLB,, | Performed by: INTERNAL MEDICINE

## 2021-08-10 PROCEDURE — 99499 RISK ADDL DX/OHS AUDIT: ICD-10-PCS | Mod: HCNC,S$GLB,, | Performed by: INTERNAL MEDICINE

## 2021-08-10 PROCEDURE — 99999 PR PBB SHADOW E&M-EST. PATIENT-LVL IV: ICD-10-PCS | Mod: PBBFAC,,, | Performed by: INTERNAL MEDICINE

## 2021-08-10 PROCEDURE — 3074F SYST BP LT 130 MM HG: CPT | Mod: CPTII,S$GLB,, | Performed by: INTERNAL MEDICINE

## 2021-08-10 RX ORDER — MORPHINE SULFATE 2 MG/ML
INJECTION, SOLUTION INTRAMUSCULAR; INTRAVENOUS
COMMUNITY
Start: 2021-02-25 | End: 2022-02-08

## 2021-08-10 RX ORDER — BUSPIRONE HYDROCHLORIDE 10 MG/1
10 TABLET ORAL DAILY
COMMUNITY
Start: 2021-05-18 | End: 2022-11-01 | Stop reason: SDUPTHER

## 2021-08-10 RX ORDER — AMLODIPINE BESYLATE 5 MG/1
TABLET ORAL
COMMUNITY
Start: 2021-04-16 | End: 2022-02-08

## 2021-08-10 RX ORDER — HYDROXYZINE PAMOATE 50 MG/1
CAPSULE ORAL
COMMUNITY
Start: 2021-04-02 | End: 2022-02-08

## 2021-08-10 RX ORDER — PREDNISONE 20 MG/1
20 TABLET ORAL DAILY
Qty: 7 TABLET | Refills: 0 | Status: SHIPPED | OUTPATIENT
Start: 2021-08-10 | End: 2022-02-08

## 2021-08-10 RX ORDER — DIAZEPAM 2 MG/1
TABLET ORAL
COMMUNITY
Start: 2021-03-23 | End: 2022-02-08

## 2021-08-10 RX ORDER — LEVOFLOXACIN 500 MG/1
TABLET, FILM COATED ORAL
Qty: 4 TABLET | Refills: 0 | Status: SHIPPED | OUTPATIENT
Start: 2021-08-10 | End: 2022-02-14

## 2021-08-27 ENCOUNTER — NURSE TRIAGE (OUTPATIENT)
Dept: ADMINISTRATIVE | Facility: CLINIC | Age: 76
End: 2021-08-27

## 2021-08-31 ENCOUNTER — NURSE TRIAGE (OUTPATIENT)
Dept: ADMINISTRATIVE | Facility: CLINIC | Age: 76
End: 2021-08-31

## 2021-09-01 ENCOUNTER — NURSE TRIAGE (OUTPATIENT)
Dept: ADMINISTRATIVE | Facility: CLINIC | Age: 76
End: 2021-09-01

## 2021-09-02 ENCOUNTER — NURSE TRIAGE (OUTPATIENT)
Dept: ADMINISTRATIVE | Facility: CLINIC | Age: 76
End: 2021-09-02

## 2021-09-08 ENCOUNTER — NURSE TRIAGE (OUTPATIENT)
Dept: ADMINISTRATIVE | Facility: CLINIC | Age: 76
End: 2021-09-08

## 2021-09-08 DIAGNOSIS — M16.0 PRIMARY OSTEOARTHRITIS OF BOTH HIPS: ICD-10-CM

## 2021-09-08 DIAGNOSIS — G89.29 CHRONIC NECK PAIN: ICD-10-CM

## 2021-09-08 DIAGNOSIS — M54.41 CHRONIC MIDLINE LOW BACK PAIN WITH RIGHT-SIDED SCIATICA: ICD-10-CM

## 2021-09-08 DIAGNOSIS — G89.29 CHRONIC MIDLINE LOW BACK PAIN WITH RIGHT-SIDED SCIATICA: ICD-10-CM

## 2021-09-08 DIAGNOSIS — M54.2 CHRONIC NECK PAIN: ICD-10-CM

## 2021-09-08 RX ORDER — HYDROCODONE BITARTRATE AND ACETAMINOPHEN 10; 325 MG/1; MG/1
1 TABLET ORAL 3 TIMES DAILY PRN
Qty: 90 TABLET | Refills: 0 | Status: CANCELLED | OUTPATIENT
Start: 2021-09-08 | End: 2021-10-08

## 2021-09-08 RX ORDER — HYDROCODONE BITARTRATE AND ACETAMINOPHEN 10; 325 MG/1; MG/1
1 TABLET ORAL 3 TIMES DAILY PRN
Qty: 90 TABLET | Refills: 0 | Status: SHIPPED | OUTPATIENT
Start: 2021-09-08 | End: 2021-10-05 | Stop reason: SDUPTHER

## 2021-10-01 RX ORDER — MEGESTROL ACETATE 40 MG/1
TABLET ORAL
Qty: 30 TABLET | Refills: 0 | Status: CANCELLED | OUTPATIENT
Start: 2021-10-01

## 2021-10-05 DIAGNOSIS — M16.0 PRIMARY OSTEOARTHRITIS OF BOTH HIPS: ICD-10-CM

## 2021-10-05 DIAGNOSIS — M54.2 CHRONIC NECK PAIN: ICD-10-CM

## 2021-10-05 DIAGNOSIS — M54.41 CHRONIC MIDLINE LOW BACK PAIN WITH RIGHT-SIDED SCIATICA: ICD-10-CM

## 2021-10-05 DIAGNOSIS — G89.29 CHRONIC NECK PAIN: ICD-10-CM

## 2021-10-05 DIAGNOSIS — G89.29 CHRONIC MIDLINE LOW BACK PAIN WITH RIGHT-SIDED SCIATICA: ICD-10-CM

## 2021-10-05 RX ORDER — HYDROCODONE BITARTRATE AND ACETAMINOPHEN 10; 325 MG/1; MG/1
1 TABLET ORAL 3 TIMES DAILY PRN
Qty: 90 TABLET | Refills: 0 | Status: SHIPPED | OUTPATIENT
Start: 2021-10-08 | End: 2021-10-11 | Stop reason: SDUPTHER

## 2021-10-06 RX ORDER — MEGESTROL ACETATE 40 MG/1
TABLET ORAL
Qty: 30 TABLET | Refills: 1 | OUTPATIENT
Start: 2021-10-06

## 2021-10-11 ENCOUNTER — TELEPHONE (OUTPATIENT)
Dept: PHYSICAL MEDICINE AND REHAB | Facility: CLINIC | Age: 76
End: 2021-10-11

## 2021-10-11 DIAGNOSIS — M54.2 CHRONIC NECK PAIN: ICD-10-CM

## 2021-10-11 DIAGNOSIS — G89.29 CHRONIC NECK PAIN: ICD-10-CM

## 2021-10-11 DIAGNOSIS — G89.29 CHRONIC MIDLINE LOW BACK PAIN WITH RIGHT-SIDED SCIATICA: ICD-10-CM

## 2021-10-11 DIAGNOSIS — M16.0 PRIMARY OSTEOARTHRITIS OF BOTH HIPS: ICD-10-CM

## 2021-10-11 DIAGNOSIS — M54.41 CHRONIC MIDLINE LOW BACK PAIN WITH RIGHT-SIDED SCIATICA: ICD-10-CM

## 2021-10-11 RX ORDER — HYDROCODONE BITARTRATE AND ACETAMINOPHEN 10; 325 MG/1; MG/1
1 TABLET ORAL 3 TIMES DAILY PRN
Qty: 90 TABLET | Refills: 0 | Status: SHIPPED | OUTPATIENT
Start: 2021-10-11 | End: 2021-10-28 | Stop reason: SDUPTHER

## 2021-10-14 ENCOUNTER — IMMUNIZATION (OUTPATIENT)
Dept: INTERNAL MEDICINE | Facility: CLINIC | Age: 76
End: 2021-10-14
Payer: MEDICARE

## 2021-10-14 DIAGNOSIS — Z23 NEED FOR VACCINATION: Primary | ICD-10-CM

## 2021-10-14 PROCEDURE — 91300 COVID-19, MRNA, LNP-S, PF, 30 MCG/0.3 ML DOSE VACCINE: CPT | Mod: HCNC,PBBFAC | Performed by: INTERNAL MEDICINE

## 2021-10-14 PROCEDURE — 0003A COVID-19, MRNA, LNP-S, PF, 30 MCG/0.3 ML DOSE VACCINE: CPT | Mod: HCNC,PBBFAC | Performed by: INTERNAL MEDICINE

## 2021-10-20 RX ORDER — MIRTAZAPINE 15 MG/1
15 TABLET, FILM COATED ORAL NIGHTLY
Qty: 30 TABLET | Refills: 11 | Status: SHIPPED | OUTPATIENT
Start: 2021-10-20 | End: 2022-11-01

## 2021-10-28 ENCOUNTER — OFFICE VISIT (OUTPATIENT)
Dept: PHYSICAL MEDICINE AND REHAB | Facility: CLINIC | Age: 76
End: 2021-10-28
Payer: MEDICARE

## 2021-10-28 VITALS
SYSTOLIC BLOOD PRESSURE: 126 MMHG | HEART RATE: 87 BPM | BODY MASS INDEX: 16.68 KG/M2 | WEIGHT: 90.63 LBS | HEIGHT: 62 IN | DIASTOLIC BLOOD PRESSURE: 64 MMHG

## 2021-10-28 DIAGNOSIS — G89.29 CHRONIC RIGHT SHOULDER PAIN: ICD-10-CM

## 2021-10-28 DIAGNOSIS — M25.511 CHRONIC RIGHT SHOULDER PAIN: ICD-10-CM

## 2021-10-28 DIAGNOSIS — M54.41 CHRONIC MIDLINE LOW BACK PAIN WITH RIGHT-SIDED SCIATICA: Primary | ICD-10-CM

## 2021-10-28 DIAGNOSIS — G89.29 CHRONIC NECK PAIN: ICD-10-CM

## 2021-10-28 DIAGNOSIS — M47.26 OSTEOARTHRITIS OF SPINE WITH RADICULOPATHY, LUMBAR REGION: ICD-10-CM

## 2021-10-28 DIAGNOSIS — M96.1 LUMBAR POSTLAMINECTOMY SYNDROME: ICD-10-CM

## 2021-10-28 DIAGNOSIS — M54.2 CHRONIC NECK PAIN: ICD-10-CM

## 2021-10-28 DIAGNOSIS — M17.0 PRIMARY OSTEOARTHRITIS OF BOTH KNEES: ICD-10-CM

## 2021-10-28 DIAGNOSIS — Z79.891 CHRONICALLY ON OPIATE THERAPY: ICD-10-CM

## 2021-10-28 DIAGNOSIS — G89.29 CHRONIC LEFT SHOULDER PAIN: ICD-10-CM

## 2021-10-28 DIAGNOSIS — M96.1 CERVICAL POST-LAMINECTOMY SYNDROME: ICD-10-CM

## 2021-10-28 DIAGNOSIS — M25.512 CHRONIC LEFT SHOULDER PAIN: ICD-10-CM

## 2021-10-28 DIAGNOSIS — G89.29 CHRONIC MIDLINE LOW BACK PAIN WITH RIGHT-SIDED SCIATICA: Primary | ICD-10-CM

## 2021-10-28 DIAGNOSIS — M16.0 PRIMARY OSTEOARTHRITIS OF BOTH HIPS: ICD-10-CM

## 2021-10-28 PROCEDURE — 99214 OFFICE O/P EST MOD 30 MIN: CPT | Mod: HCNC,S$GLB,, | Performed by: PHYSICAL MEDICINE & REHABILITATION

## 2021-10-28 PROCEDURE — 3078F DIAST BP <80 MM HG: CPT | Mod: HCNC,CPTII,S$GLB, | Performed by: PHYSICAL MEDICINE & REHABILITATION

## 2021-10-28 PROCEDURE — 1125F PR PAIN SEVERITY QUANTIFIED, PAIN PRESENT: ICD-10-PCS | Mod: HCNC,CPTII,S$GLB, | Performed by: PHYSICAL MEDICINE & REHABILITATION

## 2021-10-28 PROCEDURE — 99999 PR PBB SHADOW E&M-EST. PATIENT-LVL IV: CPT | Mod: PBBFAC,HCNC,, | Performed by: PHYSICAL MEDICINE & REHABILITATION

## 2021-10-28 PROCEDURE — 99999 PR PBB SHADOW E&M-EST. PATIENT-LVL IV: ICD-10-PCS | Mod: PBBFAC,HCNC,, | Performed by: PHYSICAL MEDICINE & REHABILITATION

## 2021-10-28 PROCEDURE — 3074F PR MOST RECENT SYSTOLIC BLOOD PRESSURE < 130 MM HG: ICD-10-PCS | Mod: HCNC,CPTII,S$GLB, | Performed by: PHYSICAL MEDICINE & REHABILITATION

## 2021-10-28 PROCEDURE — 1125F AMNT PAIN NOTED PAIN PRSNT: CPT | Mod: HCNC,CPTII,S$GLB, | Performed by: PHYSICAL MEDICINE & REHABILITATION

## 2021-10-28 PROCEDURE — 1159F MED LIST DOCD IN RCRD: CPT | Mod: HCNC,CPTII,S$GLB, | Performed by: PHYSICAL MEDICINE & REHABILITATION

## 2021-10-28 PROCEDURE — 99214 PR OFFICE/OUTPT VISIT, EST, LEVL IV, 30-39 MIN: ICD-10-PCS | Mod: HCNC,S$GLB,, | Performed by: PHYSICAL MEDICINE & REHABILITATION

## 2021-10-28 PROCEDURE — 3078F PR MOST RECENT DIASTOLIC BLOOD PRESSURE < 80 MM HG: ICD-10-PCS | Mod: HCNC,CPTII,S$GLB, | Performed by: PHYSICAL MEDICINE & REHABILITATION

## 2021-10-28 PROCEDURE — 1159F PR MEDICATION LIST DOCUMENTED IN MEDICAL RECORD: ICD-10-PCS | Mod: HCNC,CPTII,S$GLB, | Performed by: PHYSICAL MEDICINE & REHABILITATION

## 2021-10-28 PROCEDURE — 3074F SYST BP LT 130 MM HG: CPT | Mod: HCNC,CPTII,S$GLB, | Performed by: PHYSICAL MEDICINE & REHABILITATION

## 2021-10-28 RX ORDER — PREGABALIN 25 MG/1
25 CAPSULE ORAL 2 TIMES DAILY
Qty: 90 CAPSULE | Refills: 0 | Status: SHIPPED | OUTPATIENT
Start: 2021-10-28 | End: 2021-12-08 | Stop reason: SDUPTHER

## 2021-10-28 RX ORDER — DICLOFENAC SODIUM 10 MG/G
2 GEL TOPICAL 3 TIMES DAILY PRN
Qty: 300 G | Refills: 3 | Status: SHIPPED | OUTPATIENT
Start: 2021-10-28 | End: 2022-04-15

## 2021-10-28 RX ORDER — HYDROCODONE BITARTRATE AND ACETAMINOPHEN 10; 325 MG/1; MG/1
1 TABLET ORAL 3 TIMES DAILY PRN
Qty: 90 TABLET | Refills: 0 | Status: SHIPPED | OUTPATIENT
Start: 2021-11-10 | End: 2021-11-11 | Stop reason: SDUPTHER

## 2021-10-29 RX ORDER — IPRATROPIUM BROMIDE AND ALBUTEROL SULFATE 2.5; .5 MG/3ML; MG/3ML
3 SOLUTION RESPIRATORY (INHALATION) EVERY 6 HOURS PRN
Qty: 180 ML | Refills: 11 | Status: SHIPPED | OUTPATIENT
Start: 2021-10-29 | End: 2022-12-08 | Stop reason: SDUPTHER

## 2021-11-11 DIAGNOSIS — G89.29 CHRONIC MIDLINE LOW BACK PAIN WITH RIGHT-SIDED SCIATICA: ICD-10-CM

## 2021-11-11 DIAGNOSIS — M54.41 CHRONIC MIDLINE LOW BACK PAIN WITH RIGHT-SIDED SCIATICA: ICD-10-CM

## 2021-11-11 DIAGNOSIS — G89.29 CHRONIC NECK PAIN: ICD-10-CM

## 2021-11-11 DIAGNOSIS — M16.0 PRIMARY OSTEOARTHRITIS OF BOTH HIPS: ICD-10-CM

## 2021-11-11 DIAGNOSIS — M54.2 CHRONIC NECK PAIN: ICD-10-CM

## 2021-11-11 RX ORDER — HYDROCODONE BITARTRATE AND ACETAMINOPHEN 10; 325 MG/1; MG/1
1 TABLET ORAL 3 TIMES DAILY PRN
Qty: 90 TABLET | Refills: 0 | Status: SHIPPED | OUTPATIENT
Start: 2021-11-11 | End: 2021-12-13 | Stop reason: SDUPTHER

## 2021-12-07 ENCOUNTER — PATIENT MESSAGE (OUTPATIENT)
Dept: PHYSICAL MEDICINE AND REHAB | Facility: CLINIC | Age: 76
End: 2021-12-07
Payer: MEDICARE

## 2021-12-07 DIAGNOSIS — M54.41 CHRONIC MIDLINE LOW BACK PAIN WITH RIGHT-SIDED SCIATICA: ICD-10-CM

## 2021-12-07 DIAGNOSIS — G89.29 CHRONIC MIDLINE LOW BACK PAIN WITH RIGHT-SIDED SCIATICA: ICD-10-CM

## 2021-12-08 RX ORDER — PREGABALIN 25 MG/1
25 CAPSULE ORAL 2 TIMES DAILY
Qty: 90 CAPSULE | Refills: 1 | Status: SHIPPED | OUTPATIENT
Start: 2021-12-08 | End: 2022-02-08

## 2021-12-10 ENCOUNTER — PATIENT MESSAGE (OUTPATIENT)
Dept: PULMONOLOGY | Facility: CLINIC | Age: 76
End: 2021-12-10
Payer: MEDICARE

## 2021-12-13 DIAGNOSIS — G89.29 CHRONIC MIDLINE LOW BACK PAIN WITH RIGHT-SIDED SCIATICA: ICD-10-CM

## 2021-12-13 DIAGNOSIS — M16.0 PRIMARY OSTEOARTHRITIS OF BOTH HIPS: ICD-10-CM

## 2021-12-13 DIAGNOSIS — M54.2 CHRONIC NECK PAIN: ICD-10-CM

## 2021-12-13 DIAGNOSIS — G89.29 CHRONIC NECK PAIN: ICD-10-CM

## 2021-12-13 DIAGNOSIS — M54.41 CHRONIC MIDLINE LOW BACK PAIN WITH RIGHT-SIDED SCIATICA: ICD-10-CM

## 2021-12-13 RX ORDER — HYDROCODONE BITARTRATE AND ACETAMINOPHEN 10; 325 MG/1; MG/1
1 TABLET ORAL 3 TIMES DAILY PRN
Qty: 90 TABLET | Refills: 0 | Status: SHIPPED | OUTPATIENT
Start: 2021-12-13 | End: 2022-01-10 | Stop reason: SDUPTHER

## 2021-12-16 ENCOUNTER — TELEPHONE (OUTPATIENT)
Dept: FAMILY MEDICINE | Facility: CLINIC | Age: 76
End: 2021-12-16
Payer: MEDICARE

## 2021-12-20 ENCOUNTER — TELEPHONE (OUTPATIENT)
Dept: PULMONOLOGY | Facility: CLINIC | Age: 76
End: 2021-12-20
Payer: MEDICARE

## 2021-12-20 ENCOUNTER — PATIENT MESSAGE (OUTPATIENT)
Dept: FAMILY MEDICINE | Facility: CLINIC | Age: 76
End: 2021-12-20
Payer: MEDICARE

## 2021-12-23 DIAGNOSIS — J43.2 CENTRILOBULAR EMPHYSEMA: Primary | ICD-10-CM

## 2022-01-03 ENCOUNTER — ANESTHESIA EVENT (OUTPATIENT)
Dept: SURGERY | Facility: HOSPITAL | Age: 77
End: 2022-01-03
Payer: MEDICARE

## 2022-01-03 ENCOUNTER — HOSPITAL ENCOUNTER (OUTPATIENT)
Facility: HOSPITAL | Age: 77
Discharge: HOME | End: 2022-01-03
Attending: EMERGENCY MEDICINE | Admitting: SURGERY
Payer: MEDICARE

## 2022-01-03 ENCOUNTER — ANESTHESIA (OUTPATIENT)
Dept: SURGERY | Facility: HOSPITAL | Age: 77
End: 2022-01-03
Payer: MEDICARE

## 2022-01-03 VITALS
BODY MASS INDEX: 17.38 KG/M2 | TEMPERATURE: 98 F | WEIGHT: 95 LBS | RESPIRATION RATE: 17 BRPM | DIASTOLIC BLOOD PRESSURE: 68 MMHG | OXYGEN SATURATION: 95 % | SYSTOLIC BLOOD PRESSURE: 119 MMHG | HEART RATE: 60 BPM

## 2022-01-03 DIAGNOSIS — Z99.2 ESRD (END STAGE RENAL DISEASE) ON DIALYSIS: Chronic | ICD-10-CM

## 2022-01-03 DIAGNOSIS — N18.6 ESRD (END STAGE RENAL DISEASE): ICD-10-CM

## 2022-01-03 DIAGNOSIS — N18.6 ESRD (END STAGE RENAL DISEASE) ON DIALYSIS: Chronic | ICD-10-CM

## 2022-01-03 DIAGNOSIS — T82.590S DIALYSIS AV FISTULA MALFUNCTION, SEQUELA: ICD-10-CM

## 2022-01-03 DIAGNOSIS — T82.49XA CLOTTED DIALYSIS ACCESS, INITIAL ENCOUNTER: Primary | ICD-10-CM

## 2022-01-03 LAB
ALBUMIN SERPL BCP-MCNC: 4.1 G/DL (ref 3.5–5.2)
ALP SERPL-CCNC: 555 U/L (ref 55–135)
ALT SERPL W/O P-5'-P-CCNC: 11 U/L (ref 10–44)
ANION GAP SERPL CALC-SCNC: 11 MMOL/L (ref 8–16)
AST SERPL-CCNC: 19 U/L (ref 10–40)
BASOPHILS # BLD AUTO: 0.08 K/UL (ref 0–0.2)
BASOPHILS NFR BLD: 1.1 % (ref 0–1.9)
BILIRUB SERPL-MCNC: 1.2 MG/DL (ref 0.1–1)
BUN SERPL-MCNC: 46 MG/DL (ref 8–23)
CALCIUM SERPL-MCNC: 9.9 MG/DL (ref 8.7–10.5)
CHLORIDE SERPL-SCNC: 101 MMOL/L (ref 95–110)
CO2 SERPL-SCNC: 22 MMOL/L (ref 23–29)
CREAT SERPL-MCNC: 2.3 MG/DL (ref 0.5–1.4)
CTP QC/QA: YES
DIFFERENTIAL METHOD: ABNORMAL
EOSINOPHIL # BLD AUTO: 0.4 K/UL (ref 0–0.5)
EOSINOPHIL NFR BLD: 5.4 % (ref 0–8)
ERYTHROCYTE [DISTWIDTH] IN BLOOD BY AUTOMATED COUNT: 14.3 % (ref 11.5–14.5)
EST. GFR  (AFRICAN AMERICAN): 23 ML/MIN/1.73 M^2
EST. GFR  (NON AFRICAN AMERICAN): 20 ML/MIN/1.73 M^2
GLUCOSE SERPL-MCNC: 89 MG/DL (ref 70–110)
HCT VFR BLD AUTO: 40.1 % (ref 37–48.5)
HGB BLD-MCNC: 13 G/DL (ref 12–16)
IMM GRANULOCYTES # BLD AUTO: 0.03 K/UL (ref 0–0.04)
IMM GRANULOCYTES NFR BLD AUTO: 0.4 % (ref 0–0.5)
LYMPHOCYTES # BLD AUTO: 1.3 K/UL (ref 1–4.8)
LYMPHOCYTES NFR BLD: 16.6 % (ref 18–48)
MAGNESIUM SERPL-MCNC: 2 MG/DL (ref 1.6–2.6)
MCH RBC QN AUTO: 31.4 PG (ref 27–31)
MCHC RBC AUTO-ENTMCNC: 32.4 G/DL (ref 32–36)
MCV RBC AUTO: 97 FL (ref 82–98)
MONOCYTES # BLD AUTO: 0.7 K/UL (ref 0.3–1)
MONOCYTES NFR BLD: 8.8 % (ref 4–15)
NEUTROPHILS # BLD AUTO: 5.2 K/UL (ref 1.8–7.7)
NEUTROPHILS NFR BLD: 67.7 % (ref 38–73)
NRBC BLD-RTO: 0 /100 WBC
PHOSPHATE SERPL-MCNC: 4.9 MG/DL (ref 2.7–4.5)
PLATELET # BLD AUTO: 281 K/UL (ref 150–450)
PMV BLD AUTO: 9.4 FL (ref 9.2–12.9)
POTASSIUM SERPL-SCNC: 4.8 MMOL/L (ref 3.5–5.1)
PROT SERPL-MCNC: 9 G/DL (ref 6–8.4)
RBC # BLD AUTO: 4.14 M/UL (ref 4–5.4)
SARS-COV-2 RDRP RESP QL NAA+PROBE: NEGATIVE
SODIUM SERPL-SCNC: 134 MMOL/L (ref 136–145)
WBC # BLD AUTO: 7.61 K/UL (ref 3.9–12.7)

## 2022-01-03 PROCEDURE — 93010 ELECTROCARDIOGRAM REPORT: CPT | Mod: HCNC,,, | Performed by: INTERNAL MEDICINE

## 2022-01-03 PROCEDURE — 36000706: Mod: HCNC | Performed by: SURGERY

## 2022-01-03 PROCEDURE — G0378 HOSPITAL OBSERVATION PER HR: HCPCS | Mod: HCNC

## 2022-01-03 PROCEDURE — C1757 CATH, THROMBECTOMY/EMBOLECT: HCPCS | Mod: HCNC | Performed by: SURGERY

## 2022-01-03 PROCEDURE — 37000009 HC ANESTHESIA EA ADD 15 MINS: Mod: HCNC | Performed by: SURGERY

## 2022-01-03 PROCEDURE — 99285 EMERGENCY DEPT VISIT HI MDM: CPT | Mod: 25,HCNC

## 2022-01-03 PROCEDURE — 37000008 HC ANESTHESIA 1ST 15 MINUTES: Mod: HCNC | Performed by: SURGERY

## 2022-01-03 PROCEDURE — 63600175 PHARM REV CODE 636 W HCPCS: Mod: HCNC | Performed by: NURSE ANESTHETIST, CERTIFIED REGISTERED

## 2022-01-03 PROCEDURE — 80053 COMPREHEN METABOLIC PANEL: CPT | Mod: HCNC | Performed by: PHYSICIAN ASSISTANT

## 2022-01-03 PROCEDURE — U0002 COVID-19 LAB TEST NON-CDC: HCPCS | Mod: HCNC | Performed by: EMERGENCY MEDICINE

## 2022-01-03 PROCEDURE — 25000003 PHARM REV CODE 250: Mod: HCNC | Performed by: NURSE ANESTHETIST, CERTIFIED REGISTERED

## 2022-01-03 PROCEDURE — 85025 COMPLETE CBC W/AUTO DIFF WBC: CPT | Mod: HCNC | Performed by: PHYSICIAN ASSISTANT

## 2022-01-03 PROCEDURE — 84100 ASSAY OF PHOSPHORUS: CPT | Mod: HCNC | Performed by: PHYSICIAN ASSISTANT

## 2022-01-03 PROCEDURE — 93005 ELECTROCARDIOGRAM TRACING: CPT | Mod: HCNC

## 2022-01-03 PROCEDURE — 93010 EKG 12-LEAD: ICD-10-PCS | Mod: HCNC,,, | Performed by: INTERNAL MEDICINE

## 2022-01-03 PROCEDURE — 71000033 HC RECOVERY, INTIAL HOUR: Mod: HCNC | Performed by: SURGERY

## 2022-01-03 PROCEDURE — 63600175 PHARM REV CODE 636 W HCPCS: Mod: HCNC | Performed by: SURGERY

## 2022-01-03 PROCEDURE — 83735 ASSAY OF MAGNESIUM: CPT | Mod: HCNC | Performed by: PHYSICIAN ASSISTANT

## 2022-01-03 PROCEDURE — 36000707: Mod: HCNC | Performed by: SURGERY

## 2022-01-03 PROCEDURE — 25000003 PHARM REV CODE 250: Mod: HCNC | Performed by: SURGERY

## 2022-01-03 PROCEDURE — 71000015 HC POSTOP RECOV 1ST HR: Mod: HCNC | Performed by: SURGERY

## 2022-01-03 RX ORDER — LIDOCAINE HYDROCHLORIDE 10 MG/ML
INJECTION, SOLUTION EPIDURAL; INFILTRATION; INTRACAUDAL; PERINEURAL
Status: DISCONTINUED | OUTPATIENT
Start: 2022-01-03 | End: 2022-01-03 | Stop reason: HOSPADM

## 2022-01-03 RX ORDER — VASOPRESSIN 20 [USP'U]/ML
INJECTION, SOLUTION INTRAMUSCULAR; SUBCUTANEOUS
Status: DISCONTINUED | OUTPATIENT
Start: 2022-01-03 | End: 2022-01-03

## 2022-01-03 RX ORDER — ACETAMINOPHEN 325 MG/1
325 TABLET ORAL EVERY 6 HOURS PRN
Qty: 10 TABLET | Refills: 10 | Status: SHIPPED | OUTPATIENT
Start: 2022-01-03 | End: 2022-02-08

## 2022-01-03 RX ORDER — PROPOFOL 10 MG/ML
VIAL (ML) INTRAVENOUS
Status: DISCONTINUED | OUTPATIENT
Start: 2022-01-03 | End: 2022-01-03

## 2022-01-03 RX ORDER — ACETAMINOPHEN 325 MG/1
650 TABLET ORAL EVERY 4 HOURS PRN
Status: DISCONTINUED | OUTPATIENT
Start: 2022-01-03 | End: 2022-01-03 | Stop reason: HOSPADM

## 2022-01-03 RX ORDER — SODIUM CHLORIDE 0.9 % (FLUSH) 0.9 %
10 SYRINGE (ML) INJECTION
Status: DISCONTINUED | OUTPATIENT
Start: 2022-01-03 | End: 2022-01-03 | Stop reason: HOSPADM

## 2022-01-03 RX ORDER — ONDANSETRON 2 MG/ML
4 INJECTION INTRAMUSCULAR; INTRAVENOUS ONCE AS NEEDED
Status: DISCONTINUED | OUTPATIENT
Start: 2022-01-03 | End: 2022-01-03 | Stop reason: HOSPADM

## 2022-01-03 RX ORDER — HEPARIN SODIUM 10000 [USP'U]/ML
INJECTION, SOLUTION INTRAVENOUS; SUBCUTANEOUS
Status: DISCONTINUED | OUTPATIENT
Start: 2022-01-03 | End: 2022-01-03 | Stop reason: HOSPADM

## 2022-01-03 RX ORDER — FENTANYL CITRATE 50 UG/ML
INJECTION, SOLUTION INTRAMUSCULAR; INTRAVENOUS
Status: DISCONTINUED | OUTPATIENT
Start: 2022-01-03 | End: 2022-01-03

## 2022-01-03 RX ORDER — LIDOCAINE HYDROCHLORIDE 20 MG/ML
INJECTION INTRAVENOUS
Status: DISCONTINUED | OUTPATIENT
Start: 2022-01-03 | End: 2022-01-03

## 2022-01-03 RX ORDER — CLINDAMYCIN PHOSPHATE 150 MG/ML
INJECTION, SOLUTION INTRAVENOUS
Status: DISCONTINUED | OUTPATIENT
Start: 2022-01-03 | End: 2022-01-03

## 2022-01-03 RX ORDER — SODIUM CHLORIDE 9 MG/ML
INJECTION, SOLUTION INTRAVENOUS CONTINUOUS
Status: DISCONTINUED | OUTPATIENT
Start: 2022-01-03 | End: 2022-01-03 | Stop reason: HOSPADM

## 2022-01-03 RX ORDER — HYDROMORPHONE HYDROCHLORIDE 2 MG/ML
0.5 INJECTION, SOLUTION INTRAMUSCULAR; INTRAVENOUS; SUBCUTANEOUS EVERY 5 MIN PRN
Status: DISCONTINUED | OUTPATIENT
Start: 2022-01-03 | End: 2022-01-03 | Stop reason: HOSPADM

## 2022-01-03 RX ORDER — MUPIROCIN 20 MG/G
OINTMENT TOPICAL
Status: DISCONTINUED | OUTPATIENT
Start: 2022-01-03 | End: 2022-01-03 | Stop reason: HOSPADM

## 2022-01-03 RX ORDER — PHENYLEPHRINE HYDROCHLORIDE 10 MG/ML
INJECTION INTRAVENOUS
Status: DISCONTINUED | OUTPATIENT
Start: 2022-01-03 | End: 2022-01-03

## 2022-01-03 RX ORDER — HYDROCODONE BITARTRATE AND ACETAMINOPHEN 10; 325 MG/1; MG/1
1 TABLET ORAL EVERY 4 HOURS PRN
Status: DISCONTINUED | OUTPATIENT
Start: 2022-01-03 | End: 2022-01-03 | Stop reason: HOSPADM

## 2022-01-03 RX ORDER — HEPARIN SODIUM 1000 [USP'U]/ML
INJECTION, SOLUTION INTRAVENOUS; SUBCUTANEOUS
Status: DISCONTINUED | OUTPATIENT
Start: 2022-01-03 | End: 2022-01-03

## 2022-01-03 RX ADMIN — PROPOFOL 20 MG: 10 INJECTION, EMULSION INTRAVENOUS at 04:01

## 2022-01-03 RX ADMIN — PHENYLEPHRINE HYDROCHLORIDE 100 MCG: 10 INJECTION INTRAVENOUS at 05:01

## 2022-01-03 RX ADMIN — VASOPRESSIN 2 UNITS: 20 INJECTION, SOLUTION INTRAMUSCULAR; SUBCUTANEOUS at 05:01

## 2022-01-03 RX ADMIN — VASOPRESSIN 1 UNITS: 20 INJECTION, SOLUTION INTRAMUSCULAR; SUBCUTANEOUS at 05:01

## 2022-01-03 RX ADMIN — FENTANYL CITRATE 25 MCG: 50 INJECTION, SOLUTION INTRAMUSCULAR; INTRAVENOUS at 04:01

## 2022-01-03 RX ADMIN — CLINDAMYCIN PHOSPHATE 600 MCG: 150 INJECTION, SOLUTION INTRAMUSCULAR; INTRAVENOUS at 05:01

## 2022-01-03 RX ADMIN — FENTANYL CITRATE 25 MCG: 50 INJECTION, SOLUTION INTRAMUSCULAR; INTRAVENOUS at 05:01

## 2022-01-03 RX ADMIN — PROPOFOL 75 MCG/KG/MIN: 10 INJECTION, EMULSION INTRAVENOUS at 04:01

## 2022-01-03 RX ADMIN — HEPARIN SODIUM 3000 UNITS: 1000 INJECTION, SOLUTION INTRAVENOUS; SUBCUTANEOUS at 05:01

## 2022-01-03 RX ADMIN — LIDOCAINE HYDROCHLORIDE 100 MG: 20 INJECTION, SOLUTION INTRAVENOUS at 04:01

## 2022-01-03 NOTE — FIRST PROVIDER EVALUATION
Emergency Department TeleTriage Encounter Note      CHIEF COMPLAINT    Chief Complaint   Patient presents with    Vascular Access Problem     Pt reports she went for dialysis this morning at [facility] and was told her LUE access was clotted. Pt was instructed to come to ER by Dr Louie for declot this afternoon. Last dialysis was Friday, last meal was 9am today.        VITAL SIGNS   Initial Vitals [01/03/22 1350]   BP Pulse Resp Temp SpO2   (!) 120/57 66 18 97.6 °F (36.4 °C) 96 %      MAP       --            ALLERGIES    Review of patient's allergies indicates:   Allergen Reactions    Aspirin      Other reaction(s): hx of ulcers    Tetracycline Swelling     Other reaction(s): Swelling    Penicillins Rash     Other reaction(s): Hives  Other reaction(s): Rash  Other reaction(s): Rash  Other reaction(s): Hives       PROVIDER TRIAGE NOTE      Patient presents to the ER due to suspected clotting of dialysis access site.  She was unable to have dialysis this morning, last dialysis was 3 days ago.  She denies chest pain, she has baseline shortness of breath, fever , chills, cough.  Will order basic labs pending ED provider evaluation.      Initial orders will be placed and care will be transferred to an alternate provider when patient is roomed for a full evaluation. Any additional orders and the final disposition will be determined by that provider.       ORDERS  Labs Reviewed - No data to display    ED Orders (720h ago, onward)    None            Virtual Visit Note: The provider triage portion of this emergency department evaluation and documentation was performed via [employer], a HIPAA-compliant telemedicine application, in concert with a tele-presenter in the room. A face to face patient evaluation with one of my colleagues will occur once the patient is placed in an emergency department room.      DISCLAIMER: This note was prepared with [email or web address] voice recognition transcription software. Garbled syntax, mangled  pronouns, and other bizarre constructions may be attributed to that software system.

## 2022-01-03 NOTE — ANESTHESIA PREPROCEDURE EVALUATION
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     hx of ESRD on MWF HD, presented to HD this morning with clotted left AF graft - was told to present for declotting AVG.    Last HD Friday 12/31/21    Patient had cereal breakfast 9am.    Past Surgical History:   Procedure Laterality Date    BLADDER SUSPENSION      CATARACT EXTRACTION  11/18/13    left eye    CERVICAL LAMINECTOMY      x3, fusion x1    COLONOSCOPY  2009    FISTULOGRAM N/A 2/27/2020    Procedure: Fistulogram;  Surgeon: Noe Benitez MD;  Location: [facility] CATH LAB/EP;  Service: Cardiology;  Laterality: N/A;    HYSTERECTOMY      JOINT REPLACEMENT  2001    total right knee     LUMBAR LAMINECTOMY      x 3, fusion x1    OOPHORECTOMY      PERIPHERAL ANGIOGRAPHY N/A 3/9/2020    Procedure: Peripheral angiography;  Surgeon: Jacinto Henriquez MD;  Location: [facility] CATH LAB/EP;  Service: Cardiology;  Laterality: N/A;    PLACEMENT OF ARTERIOVENOUS GRAFT Left 1/21/2020    Procedure: INSERTION, GRAFT, ARTERIOVENOUS;  Surgeon: Lindsey Louie MD;  Location: [facility] OR;  Service: General;  Laterality: Left;    THROMBECTOMY Left 2/3/2020    Procedure: THROMBECTOMY;  Surgeon: Lindsey Louie MD;  Location: [facility] OR;  Service: General;  Laterality: Left;    THROMBECTOMY  3/9/2020    Procedure: Thrombectomy;  Surgeon: Jacinto Henriquez MD;  Location: [facility] CATH LAB/EP;  Service: Cardiology;;     TTE 11/10/2020:    Summary    · The left ventricle is normal in size with hyperdynamic systolic function. The estimated ejection fraction is 70-75%.  · There is left ventricular concentric remodeling.  · Indeterminate diastolic function.  · The estimated PA systolic pressure  is 31 mmHg.  · Mild right ventricular enlargement with normal right ventricular systolic function.  · Normal central venous pressure (3 mmHg).    Anesthesia Evaluation    I have reviewed the Patient Summary Reports.    I have reviewed the Nursing Notes. I have reviewed the NPO Status.      Review of Systems  Anesthesia Hx:  No problems with previous Anesthesia Denies Hx of Anesthetic complications  History of prior surgery of interest to airway management or planning: Previous anesthesia: MAC   Social:  Former Smoker    Hematology/Oncology:     Oncology Normal    -- Anemia:   EENT/Dental:EENT/Dental Normal   Cardiovascular:   Hypertension CHF Orthopnea BERTRAND    Pulmonary:   COPD, moderate Shortness of breath    Renal/:   Chronic Renal Disease, ESRD, Dialysis    Musculoskeletal:   Arthritis  S/p cervical and lumbar fusion Spine Disorders: lumbar and cervical    Neurological:   Headaches Seizures    Endocrine:  Endocrine Normal        Physical Exam  General:  Cachexia    Airway/Jaw/Neck:  Airway Findings: Mouth Opening: Normal Tongue: Normal  General Airway Assessment: Adult  Mallampati: II  Improves to II with phonation.  TM Distance: 4 - 6 cm  Jaw/Neck Findings:  Neck ROM: Extension Decreased, Mild     Eyes/Ears/Nose:  Eyes/Ears/Nose Findings:    Dental:  DENTAL FINDINGS: Normal   Chest/Lungs:  Chest/Lungs Findings: On baseline 2L NC   Heart/Vascular:  Heart Findings: Normal    Abdomen:  Abdomen Findings: Normal      Mental Status:  Mental Status Findings:  Cooperative, Alert and Oriented         Anesthesia Plan  Type of Anesthesia, risks & benefits discussed:  Anesthesia Type:  MAC    Patient's Preference:   Plan Factors:          Intra-op Monitoring Plan: standard ASA monitors  Intra-op Monitoring Plan Comments:   Post Op Pain Control Plan: per primary service following discharge from PACU and multimodal analgesia  Post Op Pain Control Plan Comments:     Induction:   IV  Beta Blocker:  Patient is not currently on  a Beta-Blocker (No further documentation required).       Informed Consent: Patient understands risks and agrees with Anesthesia plan.  Questions answered. Anesthesia consent signed with patient.  ASA Score: 3     Day of Surgery Review of History & Physical:  There are no significant changes.  H&P update referred to the surgeon.         Ready For Surgery From Anesthesia Perspective.       Recent Labs   Lab 01/03/22  1449   WBC 7.61   HGB 13.0   HCT 40.1      *   K 4.8      CREATININE 2.3*   BUN 46*   CO2 22*   ALT 11   AST 19

## 2022-01-03 NOTE — ED PROVIDER NOTES
Encounter Date: 1/3/2022       History     Chief Complaint   Patient presents with    Vascular Access Problem     Pt reports she went for dialysis this morning at [facility] and was told her LUE access was clotted. Pt was instructed to come to ER by Dr Louie for declot this afternoon. Last dialysis was Friday, last meal was 9am today.      Patient is a 76-year-old female with end-stage renal disease who was unable to be dialyzed today due to a clotted access.  Patient was told to come to the ED to be admitted to Dr. Louie for declotting of graft.  She was last dialyzed 3 days ago.  Patient denies shortness of breath.  No fever or chills.        Review of patient's allergies indicates:   Allergen Reactions    Aspirin      Other reaction(s): hx of ulcers    Tetracycline Swelling     Other reaction(s): Swelling    Penicillins Rash     Other reaction(s): Hives  Other reaction(s): Rash  Other reaction(s): Rash  Other reaction(s): Hives     Past Medical History:   Diagnosis Date    Acute congestive heart failure 02/10/2020    Anemia     Bilateral renal cysts     Cataract     Chronic LBP 7/26/2012    Chronic pain     CKD (chronic kidney disease), stage IV     Colon polyp 2013    COPD (chronic obstructive pulmonary disease)     Dehydration     Encounter for blood transfusion     HTN (hypertension)     Lumbar spondylosis     Melanoma     of the lip    Metabolic bone disease     Migraines, neuralgic     Osteoporosis     Primary osteoarthritis of both knees     s/p Rt TKA    Pulmonary embolism with infarction     Seizures 1972    x1 only    Subdeltoid bursitis, L>R. 9/27/2012    Ulcer     Vitamin D deficiency disease      Past Surgical History:   Procedure Laterality Date    BLADDER SUSPENSION      CATARACT EXTRACTION  11/18/13    left eye    CERVICAL LAMINECTOMY      x3, fusion x1    COLONOSCOPY  2009    FISTULOGRAM N/A 2/27/2020    Procedure: Fistulogram;  Surgeon: Noe Benitez MD;   Location: [facility] CATH LAB/EP;  Service: Cardiology;  Laterality: N/A;    HYSTERECTOMY      JOINT REPLACEMENT  2001    total right knee     LUMBAR LAMINECTOMY      x 3, fusion x1    OOPHORECTOMY      PERIPHERAL ANGIOGRAPHY N/A 3/9/2020    Procedure: Peripheral angiography;  Surgeon: Jacinto Henriquez MD;  Location: [facility] CATH LAB/EP;  Service: Cardiology;  Laterality: N/A;    PLACEMENT OF ARTERIOVENOUS GRAFT Left 1/21/2020    Procedure: INSERTION, GRAFT, ARTERIOVENOUS;  Surgeon: Lindsey Louie MD;  Location: [facility] OR;  Service: General;  Laterality: Left;    THROMBECTOMY Left 2/3/2020    Procedure: THROMBECTOMY;  Surgeon: Lindsey Louie MD;  Location: [facility] OR;  Service: General;  Laterality: Left;    THROMBECTOMY  3/9/2020    Procedure: Thrombectomy;  Surgeon: Jacinto Henriquez MD;  Location: [facility] CATH LAB/EP;  Service: Cardiology;;     Family History   Problem Relation Age of Onset    Arthritis Mother     Stroke Mother     Hypertension Father     Cancer Father     Cataracts Father     Diabetes Maternal Aunt     Hypertension Maternal Grandfather     Heart disease Maternal Grandfather     Heart attack Maternal Grandfather     Cataracts Sister     Glaucoma Cousin      Social History     Tobacco Use    Smoking status: Former Smoker     Types: Cigarettes    Smokeless tobacco: Former User     Quit date: 2/3/2015   Substance Use Topics    Alcohol use: Not Currently     Comment: Rare    Drug use: No     Review of Systems   Constitutional: Negative for chills and fever.   Respiratory: Negative for cough and shortness of breath.    Cardiovascular: Negative for chest pain.   Gastrointestinal: Negative for diarrhea.       Physical Exam     Initial Vitals [01/03/22 1350]   BP Pulse Resp Temp SpO2   (!) 120/57 66 18 97.6 °F (36.4 °C) 96 %      MAP       --         Physical Exam    Nursing note and vitals reviewed.  Constitutional: No distress.   HENT:   Head: Atraumatic.   Eyes: EOM are normal.    Neck: Neck supple.   Cardiovascular: Normal rate, regular rhythm and normal heart sounds.   Pulmonary/Chest:   Coarse crackles at bases bilaterally.   Abdominal: Abdomen is soft. There is no abdominal tenderness.   Musculoskeletal:         General: No edema.      Cervical back: Neck supple.      Comments: Dialysis access left upper arm without palpable thrill.     Neurological: She is alert and oriented to person, place, and time.   Skin: Skin is warm.   Psychiatric: Thought content normal.         ED Course   Procedures  Labs Reviewed   CBC W/ AUTO DIFFERENTIAL - Abnormal; Notable for the following components:       Result Value    MCH 31.4 (*)     Lymph % 16.6 (*)     All other components within normal limits   COMPREHENSIVE METABOLIC PANEL   MAGNESIUM   PHOSPHORUS   SARS-COV-2 RDRP GENE          Imaging Results    None          Medications - No data to display  Medical Decision Making:   Initial Assessment:   76-year-old female with end-stage renal disease sent here for clotted dialysis access.  ED Management:  Discussed with Dr. Louie.  I will write admit orders for him and he will perform declot this afternoon.                      Clinical Impression:   Final diagnoses:  [N18.6] ESRD (end stage renal disease)  [T82.49XA] Clotted dialysis access, initial encounter (Primary)          ED Disposition Condition    Observation               Marvin Bolaños MD  01/03/22 5624

## 2022-01-03 NOTE — ED NOTES
APPEARANCE: Alert, oriented and in no acute distress.  CARDIAC: Normal rate and rhythm, no murmur heard.   PERIPHERAL VASCULAR: peripheral pulses present. Normal cap refill. No edema. Warm to touch.  Clotted lt arm graft.  RESPIRATORY:Normal rate and effort, breath sounds clear diminished  bilaterally throughout chest. Respirations are equal and unlabored no obvious signs of distress.   GASTRO: soft, bowel sounds normal, no tenderness, no abdominal distention.  MUSC: Full ROM. No bony tenderness or soft tissue tenderness. No obvious deformity.  SKIN: Skin is warm and dry, normal skin turgor, mucous membranes moist.  NEURO: 5/5 strength major flexors/extensors bilaterally. Sensory intact to light touch bilaterally. [place] coma scale: eyes open spontaneously-4, oriented & converses-5, obeys commands-6. No neurological abnormalities.   MENTAL STATUS: awake, alert and aware of environment.  EYE: PERRL, both eyes: pupils brisk and reactive to light. Normal size.

## 2022-01-04 NOTE — TRANSFER OF CARE
Anesthesia Transfer of Care Note    Patient: Katie Quevedo    Procedure(s) Performed: Procedure(s) (LRB):  [id number] (Left)    Patient location: OPS    Anesthesia Type: MAC    Transport from OR: Transported from OR on 2-3 L/min O2 by NC with adequate spontaneous ventilation    Post pain: adequate analgesia    Post assessment: no apparent anesthetic complications    Post vital signs: stable    Level of consciousness: awake, alert and oriented    Nausea/Vomiting: no nausea/vomiting    Complications: none    Transfer of care protocol was followed      Last vitals:   Visit Vitals  BP (!) 120/57 (BP Location: Right arm, Patient Position: Sitting)   Pulse 63   Temp 36.6 °C (97.9 °F) (Skin)   Resp 17   Wt 43.1 kg (95 lb)   LMP  (LMP Unknown)   SpO2 (!) 94%   BMI 17.38 kg/m²

## 2022-01-04 NOTE — ANESTHESIA POSTPROCEDURE EVALUATION
Anesthesia Post Evaluation    Patient: Katie Quevedo    Procedure(s) Performed: Procedure(s) (LRB):  [id number] (Left)    Final Anesthesia Type: MAC      Patient location during evaluation: OPS  Patient participation: Yes- Able to Participate  Level of consciousness: awake and alert and oriented  Post-procedure vital signs: reviewed and stable  Pain management: adequate  Airway patency: patent    PONV status at discharge: No PONV  Anesthetic complications: no      Cardiovascular status: blood pressure returned to baseline  Respiratory status: unassisted and spontaneous ventilation  Hydration status: euvolemic  Follow-up not needed.          Vitals Value Taken Time   /64 01/03/22 1815   Temp 36.6 °C (97.9 °F) 01/03/22 1815   Pulse 63 01/03/22 1815   Resp 17 01/03/22 1815   SpO2 94 % 01/03/22 1815         No case tracking events are documented in the log.      Pain/Nelson Score: Nelson Score: 8 (1/3/2022  6:15 PM)

## 2022-01-04 NOTE — DISCHARGE INSTRUCTIONS
Patient Education       Placement of a Vascular Access for Dialysis   Why is this procedure done?   Doctors join an artery and a vein under your skin to make a vascular access for dialysis. This gives direct access to your blood vessels for dialysis. The vascular access is most often placed on the upper or lower arm. It can be made in one of two ways:  · Fistula ? A connection joining a vein and artery  · Graft ? A man-made tube connects an artery to a vein     What will the results be?   You will have an area where the dialysis needles are placed when having treatments to clean your blood.  What happens before the procedure?   · Your doctor will take your history. Talk to your doctor about:  ? All the drugs you are taking. Be sure to include all prescription and over-the-counter (OTC) drugs, and herbal supplements. Tell the doctor about any drug allergy. Bring a list of drugs you take with you.  ? Any bleeding problems. Be sure to tell your doctor if you are taking any drugs that may cause bleeding. Some of these are warfarin, rivaroxaban, apixaban, ticagrelor, clopidogrel, ketorolac, ibuprofen, naproxen, or aspirin. Certain vitamins and herbs, such as garlic and fish oil, may also add to the risk for bleeding. You may need to stop these drugs as well. Talk to your doctor about them.  ? When you need to stop eating or drinking before your procedure.  · Your doctor will do an exam and may order:  ? Lab tests  ? Ultrasound  ? Vein graphing. This is to make sure which vein and artery is open and large enough to be connected to use as an access.  · You will not be allowed to drive right away after the procedure. Ask a family member or a friend to drive you home.  What happens during the procedure?   · Once you are in the operating room, you will be given a drug to make you sleepy. It will also help you stay pain free during the surgery. The staff will put an I.V. in your arm to give you fluids and drugs.  · Sometimes,  the doctor will give you a special drug to make you numb for the surgery. Other times, you are fully asleep.  · The area to have the vascular access will be scrubbed with an antiseptic.  · The doctor will join the artery and vein. This area will have stitches inside and outside.  · The area will be covered with a clean bandage.  · The procedure takes about 30 to 60 minutes.  What happens after the procedure?   · You will be in the Recovery Room until you are well enough to go to your hospital room or go home.  · Your doctor will give you drugs for pain.  · Your vascular access will not be ready to use right away for dialysis. Talk to your doctor about how long it will be until your fistula or graft is ready to use. Ask about how you will do dialysis until then.  What care is needed at home?   · Ask your doctor what you need to do when you go home. Make sure you ask questions if you do not understand what the doctor says. This way you will know what you need to do.  · Check several times a day for the blood rushing through your graft or fistula. You will feel a vibration or pulse when you put your hand next to the site. This feeling is called a thrill.  · Protect your fistula or graft. Be careful not to put any extra pressure on the area. It could decrease the blood flow through your vascular access.  ? Wear loose clothing.  ? Do not sit, lie, or sleep in a position that puts pressure on the fistula or graft.  ? Do not wear any jewelry on that arm, like a watch or bracelet.  ? Do not carry anything with a strap on that arm, like a purse or grocery bag.  · Do not let anyone take your blood pressure on the same arm as your fistula or graft.  · Do not let anyone start an IV or draw blood on the same arm where your fistula or graft is.  · Talk to your doctor about how to care for your cut site. Ask your doctor about:  ? When you should change your bandages  ? When you may take a bath or shower  ? If you need to be  careful with lifting things over 10 pounds (4.5 kg)  ? When you may go back to your normal activities like work or driving  · Be sure to wash your hands before touching your wound or dressing.  · You may have some swelling or redness at the site of your fistula or graft. Raise your arm on 1 to 2 pillows with your elbow straight but relaxed.  What follow-up care is needed?   · Your doctor may ask you to make visits to the office to check on your progress. Be sure to keep these visits.  · If you have stitches or staples, you will need to have them taken out. Your doctor will often want to do this in 1 to 2 weeks.  · Your doctor will talk to you about your dialysis schedule.  What lifestyle changes are needed?   · Keep blood pressure, cholesterol, and high blood sugar under control.  · Talk to your doctor about the right amount of activity for you.  · Try not to go to crowded places where your graft or fistula can easily be bumped.  What problems could happen?   · Bleeding  · Infection  · Blood clots  · The graft or fistula does not work right.  Where can I learn more?   Centers for Disease Control and Prevention  https://www.cdc.gov/dialysis/patient/index.html   Last Reviewed Date   2020-06-01  Consumer Information Use and Disclaimer   This information is not specific medical advice and does not replace information you receive from your health care provider. This is only a brief summary of general information. It does NOT include all information about conditions, illnesses, injuries, tests, procedures, treatments, therapies, discharge instructions or life-style choices that may apply to you. You must talk with your health care provider for complete information about your health and treatment options. This information should not be used to decide whether or not to accept your health care providers advice, instructions or recommendations. Only your health care provider has the knowledge and training to provide advice  that is right for you.  Copyright   Copyright © 2021 [employer] and its affiliates and/or licensors. All rights reserved.        ANESTHESIA  -For the first 24 hours after surgery:  Do not drive, use heavy equipment, make important decisions, or drink alcohol  -It is normal to feel sleepy for several hours.  Rest until you are more awake.  -Have someone stay with you, if needed.  They can watch for problems and help keep you safe.  -Some possible post anesthesia side effects include: nausea and vomiting, sore throat and hoarseness, sleepiness, and dizziness.    PAIN  -If you have pain after surgery, pain medicine will help you feel better.  Take it as directed, before pain becomes severe.  Most pain relievers taken by mouth need at least 20-30 minutes to start working.  -Do not drive or drink alcohol while taking pain medicine.  -Pain medication can upset your stomach.  Taking them with a little food may help.  -Other ways to help control pain: elevation, ice, and relaxation  -Call your surgeon if still having unmanageable pain an hour after taking pain medicine.  -Pain medicine can cause constipation.  Taking an over-the counter stool softener while on prescription pain medicine and drinking plenty of fluids can prevent this side effect.  -Call your surgeon if you have severe side effects like: breathing problems, trouble waking up, dizziness, confusion, or severe constipation.    NAUSEA  -Some people have nausea after surgery.  This is often because of anesthesia, pain, pain medicine, or the stress of surgery.  -Do not push yourself to eat.  Start off with clear liquids and soup.  Slowly move to solid foods.  Don't eat fatty, rich, spicy foods at first.  Eat smaller amounts.  -If you develop persistent nausea and vomiting please notify your surgeon immediately.    BLEEDING  -Different types of surgery require different types of care and dressing changes.  It is important to follow all instructions and advice  from your surgeon.  Change dressing as directed.  Call your surgeon for any concerns regarding postop bleeding.    SIGNS OF INFECTION  -Signs of infection include: fever, swelling, drainage, and redness  -Notify your surgeon if you have a fever of 100.4 F (38.0 C) or higher.  -Notify your surgeon if you notice redness, swelling, increased pain, pus, or a foul smell at the incision site.

## 2022-01-04 NOTE — BRIEF OP NOTE
[facility] - Surgery (Hospital)  Operative Note      Date of Procedure: 1/3/2022     Procedure: Procedure(s) (LRB):  [id number] (Left)     Surgeon(s) and Role:     * Lindsey Louie MD - Primary    Assisting Surgeon: None    Pre-Operative Diagnosis: ESRD (end stage renal disease) [N18.6]  Clotted dialysis access, initial encounter [T82.49XA]    Post-Operative Diagnosis: Post-Op Diagnosis Codes:     * ESRD (end stage renal disease) [N18.6]     * Clotted dialysis access, initial encounter [T82.49XA]    Anesthesia: Local MAC    Operative Findings (including complications, if any): Declotting thro thrombectomy graft left upper arm done under MAC anesthesia patient tolerated well no intraop complication sent to recovery room in stable condition estimated blood loss 50 cc specimen removed blood clots.    Description of Technical Procedures:  Operative Note       Surgery Date: 1/3/2022     Surgeon(s) and Role:     * Lindsey Louie MD - Primary    Pre-op Diagnosis:  ESRD (end stage renal disease) [N18.6]  Clotted dialysis access, initial encounter [T82.49XA]    Post-op Diagnosis: Post-Op Diagnosis Codes:     * ESRD (end stage renal disease) [N18.6]     * Clotted dialysis access, initial encounter [T82.49XA]    Procedure(s) (LRB):  [id number] (Left)    Anesthesia: Local MAC    Procedure in Detail/Findings:    After satisfactory IV sedation, the patient in supine   position, left upper arm and forearm was prepped and draped in normal sterile   manner using ChloraPrep.  A stockinette was applied.  A timeout was called and   extremity was confirmed.  The area at the takeoff of the AV fistula, left upper   arm was infiltrated using 1% Xylocaine solution.  Incision was carried down to   the deep subcutaneous tissues.  Subcutaneous bleeders clamped and bovied and   further taken down.  Short segment of graft was isolated.  Proximal and distal   control was obtained.  Graft incision was made.  Crystal  catheter first induced   on the venous side.  Good backward flow was obtained.  The patient was   heparinized using 3000 units of heparin.  Crystal catheter was then introduced   on the arterial side and excellent forward flow was obtained.  The graft   incision was closed using running 5-0 Prolene suture.  The patient had good   bruit after completion of procedure.  Hemostasis satisfactorily maintained.    Wound was irrigated with antibiotic solution and then approximated using 3-0   Vicryl for subcutaneous tissue.  The skin was closed using running 4-0 nylon   suture.  Sterile gauze dressing was applied.  Instrument count, sponge count and   needle count was correct.  The patient tolerated it well.  Estimated blood loss   was 50 mL.  Specimen removed was thrombus.  No intraoperative complications.    Intraoperative finding is clotted left upper arm access.  The patient was sent   to the Recovery Room in stable condition.          Estimated Blood Loss: 50 cc         Specimens (From admission, onward)    None        Implants: * No implants in log *           Disposition: PACU - hemodynamically stable.           Condition: Good    Attestation:  I performed the procedure.           Discharge Note    Admit Date: 1/3/2022    Attending Physician: No att. providers found     Discharge Physician: No att. providers found    Final Diagnosis: Post-Op Diagnosis Codes:     * ESRD (end stage renal disease) [N18.6]     * Clotted dialysis access, initial encounter [T82.49XA]    Disposition: Home or Self Care    Patient Instructions:   Current Discharge Medication List      START taking these medications    Details   acetaminophen (TYLENOL) 325 MG tablet Take 1 tablet (325 mg total) by mouth every 6 (six) hours as needed for Pain.  Qty: 10 tablet, Refills: 10         CONTINUE these medications which have NOT CHANGED    Details   albuterol (VENTOLIN HFA) 90 mcg/actuation inhaler Inhale 2 puffs into the lungs every 6 (six) hours as  needed for Wheezing or Shortness of Breath. Rescue  Qty: 18 g, Refills: 3    Comments: May substitute for insurance needs  Associated Diagnoses: Centrilobular emphysema      albuterol-ipratropium (DUO-NEB) 2.5 mg-0.5 mg/3 mL nebulizer solution Take 3 mLs by nebulization every 6 (six) hours as needed for Shortness of Breath. Rescue  Qty: 180 mL, Refills: 11      allopurinoL (ZYLOPRIM) 100 MG tablet Take 1 tablet (100 mg total) by mouth on Tuesday, Thursday, Saturday, and Sunday.  Qty: 30 tablet, Refills: 0      amLODIPine (NORVASC) 5 MG tablet       B complex-vitamin C-folic acid (LINDA-RADHA) 0.8 mg Tab Take 1 tablet (0.8 mg total) by mouth once daily.  Qty: 30 tablet, Refills: 11      busPIRone (BUSPAR) 10 MG tablet       !! clonazePAM (KLONOPIN) 1 MG tablet Take 1 tablet (1 mg total) by mouth daily as needed (on dialysis days for anxiety.).  Qty: 20 tablet, Refills: 1    Associated Diagnoses: Anxiety      !! clonazePAM (KLONOPIN) 1 MG tablet Take 1 tablet by mouth on Dialysis days  Qty: 20 tablet, Refills: 5      diazePAM (VALIUM) 2 MG tablet TAKE 1 TABLET EVERY DAY AS NEEDED FOR ANXIETY      diclofenac sodium (VOLTAREN) 1 % Gel Apply 2 g topically 3 (three) times daily as needed (apply).  Qty: 300 g, Refills: 3      diphenhydrAMINE (BENADRYL) 25 mg capsule Take 25 mg by mouth every 6 (six) hours as needed for Itching.      epoetin hemant-epbx (RETACRIT) 10,000 unit/mL imjection Inject 0.5 mLs (5,000 Units total) into the skin every Mon, Wed, Fri.      ergocalciferol (ERGOCALCIFEROL) 50,000 unit Cap Take 1 capsule by mouth once weekly for 10 weeks, then take 1 capsule by mouth once monthly.  Qty: 30 capsule, Refills: 0      EScitalopram oxalate (LEXAPRO) 10 MG tablet Take 1 tablet (10 mg total) by mouth once daily.  Qty: 30 tablet, Refills: 11      fluticasone-umeclidin-vilanter (TRELEGY ELLIPTA) 200-62.5-25 mcg inhaler Inhale 1 puff into the lungs once daily.  Qty: 60 each, Refills: 11      HYDROcodone-acetaminophen  (NORCO)  mg per tablet Take 1 tablet by mouth 3 (three) times daily as needed for Pain.  Qty: 90 tablet, Refills: 0    Comments: Medically necessary > 7 days due to chronic pain.  Associated Diagnoses: Chronic midline low back pain with right-sided sciatica; Chronic neck pain; Primary osteoarthritis of both hips      hydrOXYzine pamoate (VISTARIL) 50 MG Cap TAKE 1 CAPSULE TWICE DAILY FOR ANXIETY      levoFLOXacin (LEVAQUIN) 500 MG tablet Take every other day after returning home from dialysis.  Qty: 4 tablet, Refills: 0      lidocaine-prilocaine (EMLA) cream Apply topically to left arm prior to dialysis.  Qty: 30 g, Refills: 0      !! megestroL (MEGACE) 40 MG Tab take  1 tablet by mouth every day  Qty: 30 tablet, Refills: 1      !! megestroL (MEGACE) 40 MG Tab take 1 tablet by mouth daily  Qty: 30 tablet, Refills: 0      mirtazapine (REMERON) 15 MG tablet Take 1 tablet (15 mg total) by mouth every evening.  Qty: 30 tablet, Refills: 11      morphine 2 mg/mL injection       mupirocin (BACTROBAN) 2 % ointment apply by Nasal route 2 (two) times daily.  Qty: 22 g, Refills: 0      ondansetron (ZOFRAN-ODT) 4 MG TbDL Dissolve one tablet under the tongue every 6 (six) hours as needed.  Qty: 30 tablet, Refills: 3      potassium chloride SA (K-DUR,KLOR-CON) 20 MEQ tablet Take 1 tablet (20 mEq total) by mouth every Tuesday, Thursday, Saturday, Sunday.  Qty: 20 tablet, Refills: 0      predniSONE (DELTASONE) 20 MG tablet Take 1 tablet (20 mg total) by mouth once daily.  Qty: 7 tablet, Refills: 0      pregabalin (LYRICA) 25 MG capsule Take 1 capsule (25 mg total) by mouth 2 (two) times daily. In 1-2 weeks, if no relief, may increase dose to 3 times per day.  Qty: 90 capsule, Refills: 1    Associated Diagnoses: Chronic midline low back pain with right-sided sciatica      sodium bicarbonate 650 MG tablet Take 1 tablet (650 mg total) by mouth 2 (two) times daily.  Qty: 120 tablet, Refills: 11      zolpidem (AMBIEN) 5 MG Tab  Take 5 mg by mouth nightly.       !! - Potential duplicate medications found. Please discuss with provider.          Discharge Procedure Orders (must include Diet, Follow-up, Activity)   Discharge Procedure Orders (must include Diet, Follow-up, Activity)   Diet general     Keep surgical extremity elevated     Lifting restrictions     Leave dressing on - Keep it clean, dry, and intact until clinic visit     Call MD for:  temperature >100.4     Call MD for:  persistent nausea and vomiting     Call MD for:  severe uncontrolled pain     Call MD for:  redness, tenderness, or signs of infection (pain, swelling, redness, odor or green/yellow discharge around incision site)        Discharge Date: No discharge date for patient encounter.      Significant Surgical Tasks Conducted by the Assistant(s), if Applicable: na      Estimated Blood Loss (EBL): 50 cc           Implants: * No implants in log *    Specimens:   Specimen (24h ago, onward)            None                  Condition: Good    Disposition: PACU - hemodynamically stable.    Attestation: I performed the procedure.    Discharge Note    OUTCOME: Patient tolerated treatment/procedure well without complication and is now ready for discharge.    DISPOSITION: Home or Self Care    FINAL DIAGNOSIS:  <principal problem not specified>    FOLLOWUP: In clinic    DISCHARGE INSTRUCTIONS:    Discharge Procedure Orders   Diet general     Keep surgical extremity elevated     Lifting restrictions     Leave dressing on - Keep it clean, dry, and intact until clinic visit     Call MD for:  temperature >100.4     Call MD for:  persistent nausea and vomiting     Call MD for:  severe uncontrolled pain     Call MD for:  redness, tenderness, or signs of infection (pain, swelling, redness, odor or green/yellow discharge around incision site)

## 2022-01-04 NOTE — PLAN OF CARE
Tolerating po fluids well.   at bedside.  Denies pain.  Dr. Louie at bedside- to discharge patient home.  Instructed by Dr. Louie to return to outpatient dialysis tomorrow or next day.  Patient uses oxygen at home 2L/min NC.

## 2022-01-04 NOTE — H&P
Chief Complaint   Patient presents with    Vascular Access Problem       Pt reports she went for dialysis this morning at [facility] and was told her LUE access was clotted. Pt was instructed to come to ER by Dr Louie for declot this afternoon. Last dialysis was Friday, last meal was 9am today.       Patient is a 76-year-old female with end-stage renal disease who was unable to be dialyzed today due to a clotted access.  Patient was told to come to the ED to be admitted to Dr. Louie for declotting of graft.  She was last dialyzed 3 days ago.  Patient denies shortness of breath.  No fever or chills.                 Review of patient's allergies indicates:   Allergen Reactions    Aspirin         Other reaction(s): hx of ulcers    Tetracycline Swelling       Other reaction(s): Swelling    Penicillins Rash       Other reaction(s): Hives  Other reaction(s): Rash  Other reaction(s): Rash  Other reaction(s): Hives           Past Medical History:   Diagnosis Date    Acute congestive heart failure 02/10/2020    Anemia      Bilateral renal cysts      Cataract      Chronic LBP 7/26/2012    Chronic pain      CKD (chronic kidney disease), stage IV      Colon polyp 2013    COPD (chronic obstructive pulmonary disease)      Dehydration      Encounter for blood transfusion      HTN (hypertension)      Lumbar spondylosis      Melanoma       of the lip    Metabolic bone disease      Migraines, neuralgic      Osteoporosis      Primary osteoarthritis of both knees       s/p Rt TKA    Pulmonary embolism with infarction      Seizures 1972     x1 only    Subdeltoid bursitis, L>R. 9/27/2012    Ulcer      Vitamin D deficiency disease              Past Surgical History:   Procedure Laterality Date    BLADDER SUSPENSION        CATARACT EXTRACTION   11/18/13     left eye    CERVICAL LAMINECTOMY         x3, fusion x1    COLONOSCOPY   2009    FISTULOGRAM N/A 2/27/2020     Procedure: Fistulogram;  Surgeon: Noe ZAPATA  MD Eli;  Location: [facility] CATH LAB/EP;  Service: Cardiology;  Laterality: N/A;    HYSTERECTOMY        JOINT REPLACEMENT   2001     total right knee     LUMBAR LAMINECTOMY         x 3, fusion x1    OOPHORECTOMY        PERIPHERAL ANGIOGRAPHY N/A 3/9/2020     Procedure: Peripheral angiography;  Surgeon: Jacinto Henriquez MD;  Location: [facility] CATH LAB/EP;  Service: Cardiology;  Laterality: N/A;    PLACEMENT OF ARTERIOVENOUS GRAFT Left 1/21/2020     Procedure: INSERTION, GRAFT, ARTERIOVENOUS;  Surgeon: Lindsey Louie MD;  Location: [facility] OR;  Service: General;  Laterality: Left;    THROMBECTOMY Left 2/3/2020     Procedure: THROMBECTOMY;  Surgeon: Lindsey Louie MD;  Location: [facility] OR;  Service: General;  Laterality: Left;    THROMBECTOMY   3/9/2020     Procedure: Thrombectomy;  Surgeon: Jacinto Henriquez MD;  Location: [facility] CATH LAB/EP;  Service: Cardiology;;            Family History   Problem Relation Age of Onset    Arthritis Mother      Stroke Mother      Hypertension Father      Cancer Father      Cataracts Father      Diabetes Maternal Aunt      Hypertension Maternal Grandfather      Heart disease Maternal Grandfather      Heart attack Maternal Grandfather      Cataracts Sister      Glaucoma Cousin        Social History      Tobacco Use    Smoking status: Former Smoker       Types: Cigarettes    Smokeless tobacco: Former User       Quit date: 2/3/2015   Substance Use Topics    Alcohol use: Not Currently       Comment: Rare    Drug use: No      Review of Systems   Constitutional: Negative for chills and fever.   Respiratory: Negative for cough and shortness of breath.    Cardiovascular: Negative for chest pain.   Gastrointestinal: Negative for diarrhea.         Physical Exam             Initial Vitals [01/03/22 1350]   BP Pulse Resp Temp SpO2   (!) 120/57 66 18 97.6 °F (36.4 °C) 96 %       MAP           --              Physical Exam     Nursing note and vitals  reviewed.  Constitutional: No distress.   HENT:   Head: Atraumatic.   Eyes: EOM are normal.   Neck: Neck supple.   Cardiovascular: Normal rate, regular rhythm and normal heart sounds.   Pulmonary/Chest:   Coarse crackles at bases bilaterally.   Abdominal: Abdomen is soft. There is no abdominal tenderness.   Musculoskeletal:         General: No edema.      Cervical back: Neck supple.      Comments: Dialysis access left upper arm without palpable thrill.     Neurological: She is alert and oriented to person, place, and time.   Skin: Skin is warm.   Psychiatric: Thought content normal.         Imp: clotted graft left upper arm, crf 5    Plan Declotting today

## 2022-01-10 DIAGNOSIS — G89.29 CHRONIC NECK PAIN: ICD-10-CM

## 2022-01-10 DIAGNOSIS — M16.0 PRIMARY OSTEOARTHRITIS OF BOTH HIPS: ICD-10-CM

## 2022-01-10 DIAGNOSIS — M54.41 CHRONIC MIDLINE LOW BACK PAIN WITH RIGHT-SIDED SCIATICA: ICD-10-CM

## 2022-01-10 DIAGNOSIS — G89.29 CHRONIC MIDLINE LOW BACK PAIN WITH RIGHT-SIDED SCIATICA: ICD-10-CM

## 2022-01-10 DIAGNOSIS — M54.2 CHRONIC NECK PAIN: ICD-10-CM

## 2022-01-10 RX ORDER — HYDROCODONE BITARTRATE AND ACETAMINOPHEN 10; 325 MG/1; MG/1
1 TABLET ORAL 3 TIMES DAILY PRN
Qty: 90 TABLET | Refills: 0 | Status: SHIPPED | OUTPATIENT
Start: 2022-01-13 | End: 2022-01-13 | Stop reason: SDUPTHER

## 2022-01-10 NOTE — TELEPHONE ENCOUNTER
----- Message from Andreia Bran sent at 1/10/2022  4:25 PM CST -----  Regarding: Prescription Refill    Regarding:pt called on refill for HYDROcodone-acetaminophen (NORCO)  mg per tablet      [facility]      Pts Call back number:[phone]

## 2022-01-13 DIAGNOSIS — M16.0 PRIMARY OSTEOARTHRITIS OF BOTH HIPS: ICD-10-CM

## 2022-01-13 DIAGNOSIS — G89.29 CHRONIC NECK PAIN: ICD-10-CM

## 2022-01-13 DIAGNOSIS — M54.41 CHRONIC MIDLINE LOW BACK PAIN WITH RIGHT-SIDED SCIATICA: ICD-10-CM

## 2022-01-13 DIAGNOSIS — M54.2 CHRONIC NECK PAIN: ICD-10-CM

## 2022-01-13 DIAGNOSIS — G89.29 CHRONIC MIDLINE LOW BACK PAIN WITH RIGHT-SIDED SCIATICA: ICD-10-CM

## 2022-01-13 RX ORDER — HYDROCODONE BITARTRATE AND ACETAMINOPHEN 10; 325 MG/1; MG/1
1 TABLET ORAL 3 TIMES DAILY PRN
Qty: 90 TABLET | Refills: 0 | Status: SHIPPED | OUTPATIENT
Start: 2022-01-14 | End: 2022-02-09 | Stop reason: SDUPTHER

## 2022-01-13 NOTE — TELEPHONE ENCOUNTER
----- Message from Jim Sales sent at 1/13/2022 12:19 PM CST -----  Regarding: Prescription Refill  HYDROcodone-acetaminophen (NORCO)  mg per tablet        [facility] (Ph: [phone])        Pt called and advised she needs a refill on the above medication.

## 2022-01-21 NOTE — PROGRESS NOTES
Detail Level: Detailed Subjective:       Patient ID: Katie Quevedo is a 72 y.o. female.    Chief Complaint: No chief complaint on file.    HPI    HISTORY OF PRESENT ILLNESS:  Mrs. Quevedo is a 72-year-old white female who is   followed up in the Physical Medicine Clinic for chronic low back pain with   lumbar radiculopathy, lumbar post-laminectomy syndrome, chronic neck pain with   cervical radiculopathy, post-cervical laminectomy syndrome and recurrent right   subdeltoid bursitis.  Her last visit to the clinic was on 12/08/2017.  She was   maintained on MS Contin, p.r.n. oxycodone/APAP, p.r.n. trazodone and p.r.n.   tizanidine.  Since her last visit, the patient developed pulmonary embolism.    She was hospitalized from 03/05/2018  to 3/06/2018 and discharged home on   Eliquis.  Her medications were also adjusted.    Her back pain has been the same.  It is a constant aching pain in the lumbar   spine and across her back.  She has occasional radiation to the right foot with   numbness.  Her pain is worse with activity.  Her maximum pain is 10/10 and   minimum 5/10.  Today, it is 6/10.  The patient complains of right lower   extremity weakness that was unchanged from baseline.  She denies any bowel or   bladder incontinence.    Her neck pain has been stable.  It is a constant sensation of tightness in the   cervical spine.  She denies any clear radiation to her arms, but does complain   of intermittent right hand numbness, usually associated with her neck pain.  Her   pain is worse with neck movement.  Her maximum pain is 9/10 and minimum 3/10.    Today, it is 4/10.  The patient complains of mild right upper extremity   weakness.    She is currently taking tizanidine 4 mg p.r.n., usually three times per day.    She takes hydrocodone/APAP 10/325 p.r.n., usually three times per day.  She was   previously on MS Contin and oxycodone/APAP, but these were stopped.  The patient   reports that in the past gabapentin made her sleepy.  She did not have  Quality 110: Preventive Care And Screening: Influenza Immunization: Influenza Immunization previously received during influenza season any   significant relief with Cymbalta.      MS/HN  dd: 04/09/2018 18:50:44 (CDT)  td: 04/10/2018 11:51:52 (CDT)  Doc ID   #[id number]  Job ID #[id number]    CC:         Review of Systems   Constitutional: Positive for fatigue.   Eyes: Negative for visual disturbance.   Respiratory: Negative for shortness of breath.    Cardiovascular: Negative for chest pain.   Gastrointestinal: Negative for blood in stool, constipation, nausea and vomiting.   Genitourinary: Negative for difficulty urinating.   Musculoskeletal: Positive for back pain and neck pain. Negative for gait problem.   Neurological: Negative for dizziness and headaches.   Psychiatric/Behavioral: Positive for sleep disturbance. Negative for behavioral problems.       Objective:      Physical Exam   Constitutional: She is oriented to person, place, and time. She appears well-developed and well-nourished.   Neck:   Decreased ROM.  Mild tenderness.   Musculoskeletal:   BUE:  ROM: decreased at shoulder abduction, Rt worse than Lt.  +ve Heberden's & Nilsa's nodes.  Strength:    RUE: 3+/5 at shoulder abduction, 5- elbow flexion, elbow extension, hand .   LUE: 4/5 at shoulder abduction, 5- elbow flexion, elbow extension, hand .  Sensation to pinprick:   RUE: mild decrease.   LUE: intact.    Impingement Signs:  Neer:  RUE: +ve    LUE: -ve  Lewis: RUE: +ve    LUE: -ve     BLE:  ROM:full.  +ve bilateral knee crepitus.   Strength:      RLE: 5/5 at hip flexion, knee extension, ankle DF/PF, EHL.     LLE:  5/5 at hip flexion, knee extension, ankle DF/PF, EHL.  Sensation to pinprick:     RLE: mild decrease.      LLE: mild decrease.   SLR (sitting):      RLE: -ve.      LLE: -ve.    +ve mild tenderness over lumbar spine.     Neurological: She is alert and oriented to person, place, and time.   Psychiatric: She has a normal mood and affect.   Vitals reviewed.        Assessment:       1. Chronic midline low back pain with right-sided sciatica    2. Osteoarthritis of spine  with radiculopathy, lumbar region    3. Lumbar postlaminectomy syndrome    4. Chronic neck pain    5. Cervical post-laminectomy syndrome    6. Primary osteoarthritis of both knees    7. Primary osteoarthritis of both hips    8. Subdeltoid bursitis, right        Plan:       - Continue hydrocodone-acetaminophen (PERCOCET)  mg per tablet; Take 1 tablet by mouth 3 (three) times daily as needed for Pain.  - Continue trazodone (DESYREL) 50 MG tablet; Take 1-2 tablets ( mg total) by mouth nightly as needed for Insomnia.  - Continue tizanidine (ZANAFLEX) 4 MG tablet; Take 1 tablet (4 mg total) by mouth 3 (three) times daily as needed.  - MRI of lumbar spine and referral to pain Clinic for AJAY were recommended, but the patient is still not interested.  - The patient is not able to commit to a course of Physical Therapy at this point.  - Regular home exercise program was encouraged.  - Follow-up in about 3 months (around 7/9/2018).      This was a 25 minute visit, more than 50% of which was spent counseling the patient about the diagnosis and the treatment plan.

## 2022-01-24 RX ORDER — CLONAZEPAM 1 MG/1
TABLET ORAL
Qty: 20 TABLET | Refills: 5 | OUTPATIENT
Start: 2022-01-24

## 2022-01-24 NOTE — TELEPHONE ENCOUNTER
Care Due:                  Date            Visit Type   Department     Provider  --------------------------------------------------------------------------------                                             [facility]  Last Visit: 03-      None         MEDICINE       [facility]  Next Visit: None Scheduled  None         None Found                                                            Last  Test          Frequency    Reason                     Performed    Due Date  --------------------------------------------------------------------------------    Office Visit  12 months..  EScitalopram,              03- 03-                             fluticasone-umeclidin-bobby                             anter, mirtazapine.......    Lipid Panel.  12 months..  mirtazapine..............  Not Found    Overdue    Powered by [employer]. Reference number: [id number].   1/24/2022 4:09:47 PM CST

## 2022-02-08 ENCOUNTER — ANESTHESIA (OUTPATIENT)
Dept: SURGERY | Facility: HOSPITAL | Age: 77
End: 2022-02-08
Payer: MEDICARE

## 2022-02-08 ENCOUNTER — ANESTHESIA EVENT (OUTPATIENT)
Dept: SURGERY | Facility: HOSPITAL | Age: 77
End: 2022-02-08
Payer: MEDICARE

## 2022-02-08 ENCOUNTER — HOSPITAL ENCOUNTER (OUTPATIENT)
Facility: HOSPITAL | Age: 77
Discharge: HOME | End: 2022-02-08
Attending: EMERGENCY MEDICINE | Admitting: EMERGENCY MEDICINE
Payer: MEDICARE

## 2022-02-08 VITALS
OXYGEN SATURATION: 96 % | HEART RATE: 88 BPM | BODY MASS INDEX: 17.38 KG/M2 | DIASTOLIC BLOOD PRESSURE: 58 MMHG | RESPIRATION RATE: 16 BRPM | TEMPERATURE: 98 F | WEIGHT: 95 LBS | SYSTOLIC BLOOD PRESSURE: 105 MMHG

## 2022-02-08 DIAGNOSIS — N18.6 ESRD (END STAGE RENAL DISEASE) ON DIALYSIS: ICD-10-CM

## 2022-02-08 DIAGNOSIS — I10 ESSENTIAL HYPERTENSION: Chronic | ICD-10-CM

## 2022-02-08 DIAGNOSIS — N18.6 ESRD (END STAGE RENAL DISEASE): ICD-10-CM

## 2022-02-08 DIAGNOSIS — Z99.2 ESRD (END STAGE RENAL DISEASE) ON DIALYSIS: ICD-10-CM

## 2022-02-08 DIAGNOSIS — T82.868A THROMBOSIS OF KIDNEY DIALYSIS ARTERIOVENOUS GRAFT, INITIAL ENCOUNTER: ICD-10-CM

## 2022-02-08 DIAGNOSIS — N25.81 SECONDARY HYPERPARATHYROIDISM: ICD-10-CM

## 2022-02-08 DIAGNOSIS — R06.02 SOB (SHORTNESS OF BREATH): ICD-10-CM

## 2022-02-08 DIAGNOSIS — R06.02 SHORTNESS OF BREATH: ICD-10-CM

## 2022-02-08 DIAGNOSIS — T82.590S DIALYSIS AV FISTULA MALFUNCTION, SEQUELA: ICD-10-CM

## 2022-02-08 DIAGNOSIS — J43.2 CENTRILOBULAR EMPHYSEMA: Chronic | ICD-10-CM

## 2022-02-08 DIAGNOSIS — T82.49XA CLOTTED DIALYSIS ACCESS, INITIAL ENCOUNTER: ICD-10-CM

## 2022-02-08 DIAGNOSIS — T82.49XD CLOTTED DIALYSIS ACCESS, SUBSEQUENT ENCOUNTER: Primary | ICD-10-CM

## 2022-02-08 LAB
ALBUMIN SERPL BCP-MCNC: 4.3 G/DL (ref 3.5–5.2)
ALP SERPL-CCNC: 551 U/L (ref 55–135)
ALT SERPL W/O P-5'-P-CCNC: 15 U/L (ref 10–44)
ANION GAP SERPL CALC-SCNC: 13 MMOL/L (ref 8–16)
AST SERPL-CCNC: 24 U/L (ref 10–40)
BASOPHILS # BLD AUTO: 0.1 K/UL (ref 0–0.2)
BASOPHILS NFR BLD: 1.1 % (ref 0–1.9)
BILIRUB SERPL-MCNC: 1 MG/DL (ref 0.1–1)
BUN SERPL-MCNC: 72 MG/DL (ref 8–23)
CALCIUM SERPL-MCNC: 10.2 MG/DL (ref 8.7–10.5)
CHLORIDE SERPL-SCNC: 100 MMOL/L (ref 95–110)
CO2 SERPL-SCNC: 22 MMOL/L (ref 23–29)
CREAT SERPL-MCNC: 3.9 MG/DL (ref 0.5–1.4)
CTP QC/QA: YES
DIFFERENTIAL METHOD: ABNORMAL
EOSINOPHIL # BLD AUTO: 0.4 K/UL (ref 0–0.5)
EOSINOPHIL NFR BLD: 4.4 % (ref 0–8)
ERYTHROCYTE [DISTWIDTH] IN BLOOD BY AUTOMATED COUNT: 13.8 % (ref 11.5–14.5)
EST. GFR  (AFRICAN AMERICAN): 12 ML/MIN/1.73 M^2
EST. GFR  (NON AFRICAN AMERICAN): 10 ML/MIN/1.73 M^2
GLUCOSE SERPL-MCNC: 84 MG/DL (ref 70–110)
HCT VFR BLD AUTO: 41.7 % (ref 37–48.5)
HGB BLD-MCNC: 12.9 G/DL (ref 12–16)
IMM GRANULOCYTES # BLD AUTO: 0.05 K/UL (ref 0–0.04)
IMM GRANULOCYTES NFR BLD AUTO: 0.5 % (ref 0–0.5)
LYMPHOCYTES # BLD AUTO: 1.4 K/UL (ref 1–4.8)
LYMPHOCYTES NFR BLD: 14.6 % (ref 18–48)
MAGNESIUM SERPL-MCNC: 2.3 MG/DL (ref 1.6–2.6)
MCH RBC QN AUTO: 30.1 PG (ref 27–31)
MCHC RBC AUTO-ENTMCNC: 30.9 G/DL (ref 32–36)
MCV RBC AUTO: 97 FL (ref 82–98)
MONOCYTES # BLD AUTO: 0.7 K/UL (ref 0.3–1)
MONOCYTES NFR BLD: 7.4 % (ref 4–15)
NEUTROPHILS # BLD AUTO: 6.6 K/UL (ref 1.8–7.7)
NEUTROPHILS NFR BLD: 72 % (ref 38–73)
NRBC BLD-RTO: 0 /100 WBC
PHOSPHATE SERPL-MCNC: 5.6 MG/DL (ref 2.7–4.5)
PLATELET # BLD AUTO: 294 K/UL (ref 150–450)
PMV BLD AUTO: 9.6 FL (ref 9.2–12.9)
POTASSIUM SERPL-SCNC: 5.4 MMOL/L (ref 3.5–5.1)
PROT SERPL-MCNC: 9.3 G/DL (ref 6–8.4)
RBC # BLD AUTO: 4.28 M/UL (ref 4–5.4)
SARS-COV-2 RDRP RESP QL NAA+PROBE: NEGATIVE
SODIUM SERPL-SCNC: 135 MMOL/L (ref 136–145)
WBC # BLD AUTO: 9.23 K/UL (ref 3.9–12.7)

## 2022-02-08 PROCEDURE — G0378 HOSPITAL OBSERVATION PER HR: HCPCS | Mod: HCNC

## 2022-02-08 PROCEDURE — 83735 ASSAY OF MAGNESIUM: CPT | Mod: HCNC | Performed by: PHYSICIAN ASSISTANT

## 2022-02-08 PROCEDURE — 84100 ASSAY OF PHOSPHORUS: CPT | Mod: HCNC | Performed by: PHYSICIAN ASSISTANT

## 2022-02-08 PROCEDURE — 25000003 PHARM REV CODE 250: Mod: HCNC | Performed by: NURSE ANESTHETIST, CERTIFIED REGISTERED

## 2022-02-08 PROCEDURE — 71000033 HC RECOVERY, INTIAL HOUR: Mod: HCNC | Performed by: SURGERY

## 2022-02-08 PROCEDURE — 94761 N-INVAS EAR/PLS OXIMETRY MLT: CPT | Mod: HCNC

## 2022-02-08 PROCEDURE — 36000706: Mod: HCNC | Performed by: SURGERY

## 2022-02-08 PROCEDURE — 63600175 PHARM REV CODE 636 W HCPCS: Mod: HCNC | Performed by: NURSE ANESTHETIST, CERTIFIED REGISTERED

## 2022-02-08 PROCEDURE — 36000707: Mod: HCNC | Performed by: SURGERY

## 2022-02-08 PROCEDURE — 63600175 PHARM REV CODE 636 W HCPCS: Mod: HCNC | Performed by: SURGERY

## 2022-02-08 PROCEDURE — 93010 EKG 12-LEAD: ICD-10-PCS | Mod: HCNC,,, | Performed by: INTERNAL MEDICINE

## 2022-02-08 PROCEDURE — 25000242 PHARM REV CODE 250 ALT 637 W/ HCPCS: Mod: HCNC | Performed by: EMERGENCY MEDICINE

## 2022-02-08 PROCEDURE — 94640 AIRWAY INHALATION TREATMENT: CPT | Mod: HCNC

## 2022-02-08 PROCEDURE — C1757 CATH, THROMBECTOMY/EMBOLECT: HCPCS | Mod: HCNC | Performed by: SURGERY

## 2022-02-08 PROCEDURE — 80053 COMPREHEN METABOLIC PANEL: CPT | Mod: HCNC | Performed by: PHYSICIAN ASSISTANT

## 2022-02-08 PROCEDURE — 37000009 HC ANESTHESIA EA ADD 15 MINS: Mod: HCNC | Performed by: SURGERY

## 2022-02-08 PROCEDURE — U0002 COVID-19 LAB TEST NON-CDC: HCPCS | Mod: HCNC | Performed by: PHYSICIAN ASSISTANT

## 2022-02-08 PROCEDURE — 71000015 HC POSTOP RECOV 1ST HR: Mod: HCNC | Performed by: SURGERY

## 2022-02-08 PROCEDURE — 01844 ANES VASC SHUNT/SHUNT REVJ: CPT | Mod: HCNC | Performed by: SURGERY

## 2022-02-08 PROCEDURE — 93010 ELECTROCARDIOGRAM REPORT: CPT | Mod: HCNC,,, | Performed by: INTERNAL MEDICINE

## 2022-02-08 PROCEDURE — 93005 ELECTROCARDIOGRAM TRACING: CPT | Mod: HCNC,59

## 2022-02-08 PROCEDURE — 37000008 HC ANESTHESIA 1ST 15 MINUTES: Mod: HCNC | Performed by: SURGERY

## 2022-02-08 PROCEDURE — 99285 EMERGENCY DEPT VISIT HI MDM: CPT | Mod: 25,HCNC

## 2022-02-08 PROCEDURE — 85025 COMPLETE CBC W/AUTO DIFF WBC: CPT | Mod: HCNC | Performed by: PHYSICIAN ASSISTANT

## 2022-02-08 RX ORDER — HEPARIN SODIUM 1000 [USP'U]/ML
INJECTION, SOLUTION INTRAVENOUS; SUBCUTANEOUS
Status: DISCONTINUED | OUTPATIENT
Start: 2022-02-08 | End: 2022-02-08

## 2022-02-08 RX ORDER — SODIUM CHLORIDE 9 MG/ML
INJECTION, SOLUTION INTRAVENOUS ONCE
Status: DISCONTINUED | OUTPATIENT
Start: 2022-02-08 | End: 2022-02-08 | Stop reason: HOSPADM

## 2022-02-08 RX ORDER — SODIUM CHLORIDE 9 MG/ML
INJECTION, SOLUTION INTRAVENOUS CONTINUOUS
Status: CANCELLED | OUTPATIENT
Start: 2022-02-08

## 2022-02-08 RX ORDER — VANCOMYCIN HYDROCHLORIDE 1 G/20ML
INJECTION, POWDER, LYOPHILIZED, FOR SOLUTION INTRAVENOUS
Status: DISCONTINUED | OUTPATIENT
Start: 2022-02-08 | End: 2022-02-08 | Stop reason: HOSPADM

## 2022-02-08 RX ORDER — ALBUTEROL SULFATE 2.5 MG/.5ML
10 SOLUTION RESPIRATORY (INHALATION)
Status: COMPLETED | OUTPATIENT
Start: 2022-02-08 | End: 2022-02-08

## 2022-02-08 RX ORDER — LIDOCAINE HYDROCHLORIDE 20 MG/ML
INJECTION INTRAVENOUS
Status: DISCONTINUED | OUTPATIENT
Start: 2022-02-08 | End: 2022-02-08

## 2022-02-08 RX ORDER — ONDANSETRON 2 MG/ML
4 INJECTION INTRAMUSCULAR; INTRAVENOUS ONCE AS NEEDED
Status: DISCONTINUED | OUTPATIENT
Start: 2022-02-08 | End: 2022-02-08 | Stop reason: HOSPADM

## 2022-02-08 RX ORDER — ALBUTEROL SULFATE 2.5 MG/.5ML
10 SOLUTION RESPIRATORY (INHALATION)
Status: DISCONTINUED | OUTPATIENT
Start: 2022-02-08 | End: 2022-02-08

## 2022-02-08 RX ORDER — SODIUM CHLORIDE 9 MG/ML
INJECTION, SOLUTION INTRAVENOUS
Status: DISCONTINUED | OUTPATIENT
Start: 2022-02-08 | End: 2022-02-08 | Stop reason: HOSPADM

## 2022-02-08 RX ORDER — ONDANSETRON 2 MG/ML
INJECTION INTRAMUSCULAR; INTRAVENOUS
Status: DISCONTINUED | OUTPATIENT
Start: 2022-02-08 | End: 2022-02-08

## 2022-02-08 RX ORDER — HEPARIN SODIUM 10000 [USP'U]/ML
INJECTION, SOLUTION INTRAVENOUS; SUBCUTANEOUS
Status: DISCONTINUED | OUTPATIENT
Start: 2022-02-08 | End: 2022-02-08 | Stop reason: HOSPADM

## 2022-02-08 RX ORDER — PROPOFOL 10 MG/ML
VIAL (ML) INTRAVENOUS
Status: DISCONTINUED | OUTPATIENT
Start: 2022-02-08 | End: 2022-02-08

## 2022-02-08 RX ORDER — PROPOFOL 10 MG/ML
VIAL (ML) INTRAVENOUS CONTINUOUS PRN
Status: DISCONTINUED | OUTPATIENT
Start: 2022-02-08 | End: 2022-02-08

## 2022-02-08 RX ORDER — PHENYLEPHRINE HYDROCHLORIDE 10 MG/ML
INJECTION INTRAVENOUS
Status: DISCONTINUED | OUTPATIENT
Start: 2022-02-08 | End: 2022-02-08

## 2022-02-08 RX ORDER — HYDROMORPHONE HYDROCHLORIDE 2 MG/ML
0.5 INJECTION, SOLUTION INTRAMUSCULAR; INTRAVENOUS; SUBCUTANEOUS EVERY 5 MIN PRN
Status: DISCONTINUED | OUTPATIENT
Start: 2022-02-08 | End: 2022-02-08 | Stop reason: HOSPADM

## 2022-02-08 RX ORDER — MUPIROCIN 20 MG/G
OINTMENT TOPICAL
Status: CANCELLED | OUTPATIENT
Start: 2022-02-08

## 2022-02-08 RX ORDER — ACETAMINOPHEN 325 MG/1
325 TABLET ORAL EVERY 6 HOURS PRN
Qty: 10 TABLET | Refills: 0 | Status: SHIPPED | OUTPATIENT
Start: 2022-02-08 | End: 2023-09-29

## 2022-02-08 RX ORDER — ACETAMINOPHEN 325 MG/1
650 TABLET ORAL EVERY 4 HOURS PRN
Status: CANCELLED | OUTPATIENT
Start: 2022-02-08

## 2022-02-08 RX ORDER — CLINDAMYCIN PHOSPHATE 900 MG/50ML
INJECTION, SOLUTION INTRAVENOUS
Status: DISCONTINUED | OUTPATIENT
Start: 2022-02-08 | End: 2022-02-08

## 2022-02-08 RX ORDER — SEVELAMER CARBONATE 800 MG/1
800 TABLET, FILM COATED ORAL
Status: DISCONTINUED | OUTPATIENT
Start: 2022-02-08 | End: 2022-02-08 | Stop reason: HOSPADM

## 2022-02-08 RX ADMIN — PROPOFOL 25 MCG/KG/MIN: 10 INJECTION, EMULSION INTRAVENOUS at 06:02

## 2022-02-08 RX ADMIN — SODIUM CHLORIDE: 0.9 INJECTION, SOLUTION INTRAVENOUS at 06:02

## 2022-02-08 RX ADMIN — PHENYLEPHRINE HYDROCHLORIDE 100 MCG: 10 INJECTION INTRAVENOUS at 06:02

## 2022-02-08 RX ADMIN — VASOPRESSIN 2 UNITS: 20 INJECTION INTRAVENOUS at 07:02

## 2022-02-08 RX ADMIN — PROPOFOL 20 MG: 10 INJECTION, EMULSION INTRAVENOUS at 06:02

## 2022-02-08 RX ADMIN — CALCIUM CHLORIDE 250 MG: 100 INJECTION, SOLUTION INTRAVENOUS at 07:02

## 2022-02-08 RX ADMIN — ALBUTEROL SULFATE 10 MG: 2.5 SOLUTION RESPIRATORY (INHALATION) at 11:02

## 2022-02-08 RX ADMIN — HEPARIN SODIUM 3000 UNITS: 1000 INJECTION, SOLUTION INTRAVENOUS; SUBCUTANEOUS at 07:02

## 2022-02-08 RX ADMIN — LIDOCAINE HYDROCHLORIDE 50 MG: 20 INJECTION, SOLUTION INTRAVENOUS at 06:02

## 2022-02-08 RX ADMIN — CLINDAMYCIN IN 5 PERCENT DEXTROSE 600 MG: 18 INJECTION, SOLUTION INTRAVENOUS at 06:02

## 2022-02-08 RX ADMIN — ONDANSETRON 4 MG: 2 INJECTION, SOLUTION INTRAMUSCULAR; INTRAVENOUS at 07:02

## 2022-02-08 NOTE — CONSULTS
Nephrology Consult  H&P      Consult Requested By: Marvin Bolaños, *  Reason for Consult: ESRD     SUBJECTIVE:     History of Present Illness:  Katie Quevedo is a 76 y.o.   female who  has a past medical history of  ESRD on HD via AVF [place] with Dr Aura Diggs, Acute congestive heart failure (02/10/2020), Anemia, Bilateral renal cysts, Cataract, Chronic LBP (7/26/2012), Chronic pain, CKD (chronic kidney disease), stage IV, Colon polyp (2013), COPD (chronic obstructive pulmonary disease), Dehydration, Encounter for blood transfusion, HTN (hypertension), Lumbar spondylosis, Melanoma, Metabolic bone disease, Migraines, neuralgic, Osteoporosis, Primary osteoarthritis of both knees, Pulmonary embolism with infarction, Seizures (1972), Subdeltoid bursitis, L>R. (9/27/2012), Ulcer, and Vitamin D deficiency disease.. The patient presented to the hospitals on 2/8/2022 with a primary complaint of clotted AVF not able to do HD Monday last  HD Friday.   ?    Review of Systems   Constitutional: Negative for chills and fever.   HENT: Negative for congestion and sore throat.    Eyes: Negative for blurred vision, double vision and photophobia.   Respiratory: Negative for cough and shortness of breath.    Cardiovascular: Negative for chest pain, palpitations and leg swelling.   Gastrointestinal: Negative for abdominal pain, diarrhea, nausea and vomiting.   Genitourinary: Negative for dysuria and urgency.   Musculoskeletal: Negative for joint pain and myalgias.   Skin: Negative for itching and rash.   Neurological: Negative for dizziness, sensory change, weakness and headaches.   Endo/Heme/Allergies: Negative for polydipsia. Does not bruise/bleed easily.   Psychiatric/Behavioral: Negative for depression.       Past Medical History:   Diagnosis Date    Acute congestive heart failure 02/10/2020    Anemia     Bilateral renal cysts     Cataract     Chronic LBP 7/26/2012    Chronic pain     CKD (chronic kidney  disease), stage IV     Colon polyp 2013    COPD (chronic obstructive pulmonary disease)     Dehydration     Encounter for blood transfusion     HTN (hypertension)     Lumbar spondylosis     Melanoma     of the lip    Metabolic bone disease     Migraines, neuralgic     Osteoporosis     Primary osteoarthritis of both knees     s/p Rt TKA    Pulmonary embolism with infarction     Seizures 1972    x1 only    Subdeltoid bursitis, L>R. 9/27/2012    Ulcer     Vitamin D deficiency disease      Past Surgical History:   Procedure Laterality Date    BLADDER SUSPENSION      CATARACT EXTRACTION  11/18/13    left eye    CERVICAL LAMINECTOMY      x3, fusion x1    COLONOSCOPY  2009    DECLOTTING OF ARTERIOVENOUS GRAFT Left 1/3/2022    Procedure: [id number];  Surgeon: Lindsey Louie MD;  Location: [facility] OR;  Service: Vascular;  Laterality: Left;    FISTULOGRAM N/A 2/27/2020    Procedure: Fistulogram;  Surgeon: Noe Benitez MD;  Location: [facility] CATH LAB/EP;  Service: Cardiology;  Laterality: N/A;    HYSTERECTOMY      JOINT REPLACEMENT  2001    total right knee     LUMBAR LAMINECTOMY      x 3, fusion x1    OOPHORECTOMY      PERIPHERAL ANGIOGRAPHY N/A 3/9/2020    Procedure: Peripheral angiography;  Surgeon: Jacinto Henriquez MD;  Location: [facility] CATH LAB/EP;  Service: Cardiology;  Laterality: N/A;    PLACEMENT OF ARTERIOVENOUS GRAFT Left 1/21/2020    Procedure: INSERTION, GRAFT, ARTERIOVENOUS;  Surgeon: Lindsey Louie MD;  Location: [facility] OR;  Service: General;  Laterality: Left;    THROMBECTOMY Left 2/3/2020    Procedure: THROMBECTOMY;  Surgeon: Lindsey Louie MD;  Location: [facility] OR;  Service: General;  Laterality: Left;    THROMBECTOMY  3/9/2020    Procedure: Thrombectomy;  Surgeon: Jacinto Henriquez MD;  Location: [facility] CATH LAB/EP;  Service: Cardiology;;     Family History   Problem Relation Age of Onset    Arthritis Mother     Stroke Mother     Hypertension Father      Cancer Father     Cataracts Father     Diabetes Maternal Aunt     Hypertension Maternal Grandfather     Heart disease Maternal Grandfather     Heart attack Maternal Grandfather     Cataracts Sister     Glaucoma Cousin      Social History     Tobacco Use    Smoking status: Former Smoker     Types: Cigarettes    Smokeless tobacco: Former User     Quit date: 2/3/2015   Substance Use Topics    Alcohol use: Not Currently     Comment: Rare    Drug use: No       Review of patient's allergies indicates:   Allergen Reactions    Aspirin      Other reaction(s): hx of ulcers    Tetracycline Swelling     Other reaction(s): Swelling    Penicillins Rash     Other reaction(s): Hives  Other reaction(s): Rash  Other reaction(s): Rash  Other reaction(s): Hives            OBJECTIVE:     Vital Signs (Most Recent)  Vitals:    02/08/22 1013 02/08/22 1126 02/08/22 1148   BP: (!) 140/68     BP Location: Right arm     Patient Position: Sitting     Pulse: 82 72 76   Resp: 20 20 18   Temp: 98.5 °F (36.9 °C)     TempSrc: Oral     SpO2: 96% 96% 96%   Weight: 43.1 kg (95 lb)                   Medications:          Physical Exam  Vitals and nursing note reviewed.   Constitutional:       General: She is not in acute distress.     Appearance: She is not diaphoretic.   HENT:      Head: Normocephalic and atraumatic.      Mouth/Throat:      Pharynx: No oropharyngeal exudate.   Eyes:      General: No scleral icterus.     Extraocular Movements: EOM normal.      Conjunctiva/sclera: Conjunctivae normal.      Pupils: Pupils are equal, round, and reactive to light.   Cardiovascular:      Rate and Rhythm: Normal rate and regular rhythm.      Heart sounds: Normal heart sounds. No murmur heard.      Pulmonary:      Effort: Pulmonary effort is normal. No respiratory distress.      Breath sounds: Wheezing present.   Abdominal:      General: Bowel sounds are normal. There is no distension.      Palpations: Abdomen is soft.      Tenderness: There is  no abdominal tenderness.   Musculoskeletal:         General: No edema. Normal range of motion.      Cervical back: Normal range of motion and neck supple.      Comments: AVF no thrill    Skin:     General: Skin is warm and dry.      Findings: No erythema.   Neurological:      Mental Status: She is alert and oriented to person, place, and time.      Cranial Nerves: No cranial nerve deficit.   Psychiatric:         Mood and Affect: Affect normal.         Cognition and Memory: Memory normal.         Judgment: Judgment normal.         Laboratory:  Recent Labs   Lab 02/08/22  1041   WBC 9.23   HGB 12.9   HCT 41.7      MONO 7.4  0.7     Recent Labs   Lab 02/08/22  1041   *   K 5.4*      CO2 22*   BUN 72*   CREATININE 3.9*   CALCIUM 10.2   PHOS 5.6*       Diagnostic Results:  X-Ray: Reviewed  US: Reviewed  Echo: Reviewed  ASSESSMENT/PLAN:     1. ESRD  - HD MWF [facility] With Dr Aura Diggs   Clotted AVF/Hyperkalemia  Dr Louie Consulted Pending Declot HD 2hr after Full HD session in AM   -- Keep MWF   -- Daily Renal Function Panel  -- Avoid Hypotension.  -- Renally dose all meds  2. HTN (I10) -  Controlled   3. Anemia of chronic kidney disease treated with BILL (N18.9 D63.1) -   EPogen  with   HD as need   Recent Labs   Lab 02/08/22  1041   HGB 12.9   HCT 41.7          Iron   Lab Results   Component Value Date    IRON 13 (L) 02/12/2020    TIBC 462 (H) 02/12/2020    FERRITIN 148 07/17/2019       4. MBD (E88.9 M90.80) -  Recent Labs   Lab 02/08/22  1041   CALCIUM 10.2   PHOS 5.6*     Recent Labs   Lab 02/08/22  1041   MG 2.3       Lab Results   Component Value Date    .0 (H) 12/28/2018    CALCIUM 10.2 02/08/2022    PHOS 5.6 (H) 02/08/2022     Lab Results   Component Value Date    MHMHEYTI38NP 26 (L) 02/12/2020     Sevelamer   Lab Results   Component Value Date    CO2 22 (L) 02/08/2022       5. Nutrition/Hypoalbuminemia (E88.09) -   Recent Labs   Lab 02/08/22  1041   ALBUMIN 4.3     Nepro with  meals TID. Renal vitamins daily      Thank you for the consult, will follow  With any question please call [phone]  Ramo Da Silva MD    [facility]  EDGARD Bustillos MD, NEIL SAMS MD,   MD CURTIS Beonit MD E. V. Harmon, NP    [address]  [place], [address] [phone]

## 2022-02-08 NOTE — ED TRIAGE NOTES
"Katie Quevedo, an 76 y.o. female presents to the ED with a complaint of vascular access not working. Pt went to get dialyzed yesterday and it was unable to get it done. Denies SOB, dizziness. Pt reports she feels like her normal.       Review of patient's allergies indicates:   Allergen Reactions    Aspirin      Other reaction(s): hx of ulcers    Tetracycline Swelling     Other reaction(s): Swelling    Penicillins Rash     Other reaction(s): Hives  Other reaction(s): Rash  Other reaction(s): Rash  Other reaction(s): Hives     Chief Complaint   Patient presents with    Vascular Access Problem     Pt went [facility] in [place] for M/W/F dialysis and was unable to be dialyzed d/t: " having a clot in my left arm." pt's access is to left, upper arm. She was told to come to ED to see MD Liban. Pt denies Chest pain or SOB. Hx of COPD and dialysis. Pt said it's happened before and access was: "cleaned out. I hope they can do that today." No distress noted during triage.      Past Medical History:   Diagnosis Date    Acute congestive heart failure 02/10/2020    Anemia     Bilateral renal cysts     Cataract     Chronic LBP 7/26/2012    Chronic pain     CKD (chronic kidney disease), stage IV     Colon polyp 2013    COPD (chronic obstructive pulmonary disease)     Dehydration     Encounter for blood transfusion     HTN (hypertension)     Lumbar spondylosis     Melanoma     of the lip    Metabolic bone disease     Migraines, neuralgic     Osteoporosis     Primary osteoarthritis of both knees     s/p Rt TKA    Pulmonary embolism with infarction     Seizures 1972    x1 only    Subdeltoid bursitis, L>R. 9/27/2012    Ulcer     Vitamin D deficiency disease        Patient identifiers verified and correct.     LOC: The patient is awake, alert and aware of environment with an appropriate affect, the patient is oriented x 3 and speaking appropriately.     APPEARANCE: Patient appears comfortable and in no acute " distress, patient is clean and well groomed.    HEENT: Head symmetrical. Eyes bilateral.  Bilateral ears without drainage. Bilateral nares patent, throat clear.    SKIN: The skin is warm and dry, color consistent with ethnicity, patient has normal skin turgor and moist mucus membranes, skin intact, vascular access in left upper arm.     MUSCULOSKELETAL: Patient moving all extremities spontaneously, no swelling noted.    RESPIRATORY: Airway is open and patent, respirations are spontaneous, pt uses 2l NC oxygen at home.     CARDIAC: Patient has a normal rate and regular rhythm, no edema noted, capillary refill < 3 seconds.     GASTRO: Abdomen soft and non-distended.    NEURO: Pt opens eyes spontaneously pupils equal, round, and reactive. behavior appropriate to situation, follows commands, facial expression symmetrical,    NEUROVASCULAR: All extremities are warm and pink.     Will continue to monitor.

## 2022-02-08 NOTE — ED PROVIDER NOTES
"Encounter Date: 2/8/2022       History     Chief Complaint   Patient presents with    Vascular Access Problem     Pt went [facility] in [place] for M/W/F dialysis and was unable to be dialyzed d/t: " having a clot in my left arm." pt's access is to left, upper arm. She was told to come to ED to see MD Liban. Pt denies Chest pain or SOB. Hx of COPD and dialysis. Pt said it's happened before and access was: "cleaned out. I hope they can do that today." No distress noted during triage.      77 y/o female with history of ESRD on dialysis MWF, CHF, HTN, COPD, presents to the ER due to clotting of left upper arm dialysis access.  She had last dialysis session 4 days ago.  She presented for dialysis yesterday, but they were unable to access her graft.  She ate yesterday morning so she waited to come to the ER today and has not eaten since yesterday.  She denies Chest pain, SOB, fever, vomiting, swelling or redness of the arm, drainage or bleeding from dialysis access site, leg swelling, or additional complaints at this time.  Patient last required declotting of dialysis access site on 01/03/2022        Review of patient's allergies indicates:   Allergen Reactions    Aspirin      Other reaction(s): hx of ulcers    Tetracycline Swelling     Other reaction(s): Swelling    Penicillins Rash     Other reaction(s): Hives  Other reaction(s): Rash  Other reaction(s): Rash  Other reaction(s): Hives     Past Medical History:   Diagnosis Date    Acute congestive heart failure 02/10/2020    Anemia     Bilateral renal cysts     Cataract     Chronic LBP 7/26/2012    Chronic pain     CKD (chronic kidney disease), stage IV     Colon polyp 2013    COPD (chronic obstructive pulmonary disease)     Dehydration     Encounter for blood transfusion     HTN (hypertension)     Lumbar spondylosis     Melanoma     of the lip    Metabolic bone disease     Migraines, neuralgic     Osteoporosis     Primary osteoarthritis of both " knees     s/p Rt TKA    Pulmonary embolism with infarction     Seizures 1972    x1 only    Subdeltoid bursitis, L>R. 9/27/2012    Ulcer     Vitamin D deficiency disease      Past Surgical History:   Procedure Laterality Date    BLADDER SUSPENSION      CATARACT EXTRACTION  11/18/13    left eye    CERVICAL LAMINECTOMY      x3, fusion x1    COLONOSCOPY  2009    DECLOTTING OF ARTERIOVENOUS GRAFT Left 1/3/2022    Procedure: [id number];  Surgeon: Lindsey Louie MD;  Location: [facility] OR;  Service: Vascular;  Laterality: Left;    FISTULOGRAM N/A 2/27/2020    Procedure: Fistulogram;  Surgeon: Noe Benitez MD;  Location: [facility] CATH LAB/EP;  Service: Cardiology;  Laterality: N/A;    HYSTERECTOMY      JOINT REPLACEMENT  2001    total right knee     LUMBAR LAMINECTOMY      x 3, fusion x1    OOPHORECTOMY      PERIPHERAL ANGIOGRAPHY N/A 3/9/2020    Procedure: Peripheral angiography;  Surgeon: Jacinto Henriquez MD;  Location: [facility] CATH LAB/EP;  Service: Cardiology;  Laterality: N/A;    PLACEMENT OF ARTERIOVENOUS GRAFT Left 1/21/2020    Procedure: INSERTION, GRAFT, ARTERIOVENOUS;  Surgeon: Lindsey Louie MD;  Location: [facility] OR;  Service: General;  Laterality: Left;    THROMBECTOMY Left 2/3/2020    Procedure: THROMBECTOMY;  Surgeon: Lindsey Louie MD;  Location: [facility] OR;  Service: General;  Laterality: Left;    THROMBECTOMY  3/9/2020    Procedure: Thrombectomy;  Surgeon: Jacinto Henriquez MD;  Location: [facility] CATH LAB/EP;  Service: Cardiology;;     Family History   Problem Relation Age of Onset    Arthritis Mother     Stroke Mother     Hypertension Father     Cancer Father     Cataracts Father     Diabetes Maternal Aunt     Hypertension Maternal Grandfather     Heart disease Maternal Grandfather     Heart attack Maternal Grandfather     Cataracts Sister     Glaucoma Cousin      Social History     Tobacco Use    Smoking status: Former Smoker     Types: Cigarettes    Smokeless  tobacco: Former User     Quit date: 2/3/2015   Substance Use Topics    Alcohol use: Not Currently     Comment: Rare    Drug use: No     Review of Systems   Constitutional: Negative for chills and fever.   HENT: Negative for facial swelling.    Respiratory: Negative for shortness of breath.    Cardiovascular: Negative for chest pain.   Gastrointestinal: Negative for abdominal pain, nausea and vomiting.   Musculoskeletal: Negative for back pain.   Skin: Negative for color change, rash and wound.   Neurological: Negative for weakness.   Hematological: Does not bruise/bleed easily.   Psychiatric/Behavioral: Negative for confusion.       Physical Exam     Initial Vitals [02/08/22 1013]   BP Pulse Resp Temp SpO2   (!) 140/68 82 20 98.5 °F (36.9 °C) 96 %      MAP       --         Physical Exam    Nursing note and vitals reviewed.  Constitutional: She appears well-developed and well-nourished.   HENT:   Head: Atraumatic.   Eyes: Conjunctivae and EOM are normal. Pupils are equal, round, and reactive to light.   Neck: Neck supple.   Normal range of motion.  Cardiovascular: Normal rate, regular rhythm and intact distal pulses.   Left upper arm dialysis access site - no thrill present - no overlying erythema, redness, tenderness or drainage from skin.    Pulmonary/Chest: Breath sounds normal. No respiratory distress. She has no wheezes. She has no rhonchi. She has no rales.   Abdominal: Abdomen is soft. Bowel sounds are normal. There is no abdominal tenderness.   Musculoskeletal:      Cervical back: Normal range of motion and neck supple.     Neurological: She is alert and oriented to person, place, and time. She has normal strength. GCS score is 15. GCS eye subscore is 4. GCS verbal subscore is 5. GCS motor subscore is 6.   Skin: No rash noted.   Psychiatric: She has a normal mood and affect.         ED Course   Procedures  Labs Reviewed   CBC W/ AUTO DIFFERENTIAL - Abnormal; Notable for the following components:        Result Value    MCHC 30.9 (*)     Immature Grans (Abs) 0.05 (*)     Lymph % 14.6 (*)     All other components within normal limits   COMPREHENSIVE METABOLIC PANEL - Abnormal; Notable for the following components:    Sodium 135 (*)     Potassium 5.4 (*)     CO2 22 (*)     BUN 72 (*)     Creatinine 3.9 (*)     Total Protein 9.3 (*)     Alkaline Phosphatase 551 (*)     eGFR if  12 (*)     eGFR if non  10 (*)     All other components within normal limits   PHOSPHORUS - Abnormal; Notable for the following components:    Phosphorus 5.6 (*)     All other components within normal limits   MAGNESIUM   SARS-COV-2 RDRP GENE          Imaging Results          X-Ray Chest AP Portable (Final result)  Result time 02/08/22 12:03:27    Final result by Isai Monge MD (02/08/22 12:03:27)                 Impression:      Chronic changes in the chest including chronic left pleural effusion with COPD.  No significant detrimental changes from prior.      Electronically signed by: Isai Monge MD  Date:    02/08/2022  Time:    12:03             Narrative:    EXAMINATION:  XR CHEST AP PORTABLE    TECHNIQUE:  Single frontal view of the chest was performed.    COMPARISON:  01/03/2022    FINDINGS:  Lungs are deeply expanded with emphysematous changes and COPD.  Cardiomediastinal silhouette is stable.  There is a chronic left pleural effusion.  No evidence of pneumothorax.  Stable blunting of the right costophrenic angle.  There is aortic atherosclerosis.  Bones show degenerative changes.  There are postop changes of the cervical and lumbar spine.  There is scoliosis.  There is a stent within the left axillary and brachial region likely relating to dialysis access.                                 Medications   albuterol sulfate nebulizer solution 10 mg (10 mg Nebulization Given 2/8/22 1148)           APC / Resident Notes:   Patient presents to the ER for evaluation due to clotting of left upper arm dialysis  "access site.  Patient presented for dialysis yesterday, they were unable to access her site.  She had already eaten that morning so they advised that she come to the ER today to see her vascular surgeon, Dr. Louie for declotting of the graft.  Patient has no complaints at this time.  On exam she has Left upper arm dialysis access site - no thrill present - no overlying erythema, redness, tenderness or drainage from skin.     I discussed patient's presentation with Dr. Louie, he is advised to place patient in observation under surgery service.  He will place orders for declotting graft today.      Labs reveal potassium 5.4 - discussed with Dr. Bolaños, will give albuterol to shift potassium. Phosphorus elevated to 5.6, BUN/Cr 72/3.9.  Patient will have dialysis graft declotted today. Will consult nephrology as patient will also need dialysis, Dr. Da Silva aware.  EKG NSR, rate 80, no evidence of acute ischemia, no peaked T waves or other concerning EKG changes.  Chest xray reveal "Chronic changes in the chest  including chronic left pleural effusion with COPD.  No significant detrimental changes from prior" per radiology.    Patient is hemodynamically stable, no evidence of infection.  She will be admitted to general surgery service, Staff Dr. Louie for purpose of declotting of graft with possible revision.     I discussed the care this patient with my supervising physician.  Patient updated on plan for admission and she is comfortable with this plan.  Will remain NPO.       Attending Attestation:     Physician Attestation Statement for NP/PA:   I discussed this assessment and plan of this patient with the NP/PA, but I did not personally examine the patient. The face to face encounter was performed by the NP/PA.              ED Course as of 02/08/22 1245   Tue Feb 08, 2022   1057 I spoke with Dr. Louie with vascular surgery - I have informed him that Ms. Quevedo has a clotted left arm dialysis graft.  Labs are " pending.  He has advised admission to observation and he will place orders for declotting dialysis access today.  Patient should remain NPO.  [LH]      ED Course User Index  [LH] MIGNON Allen             Clinical Impression:   Final diagnoses:  [N18.6] ESRD (end stage renal disease)  [T82.49XA] Clotted dialysis access, initial encounter  [T82.868A] Thrombosis of kidney dialysis arteriovenous graft, initial encounter          ED Disposition Condition    Observation               MIGNON Allen  02/08/22 1245       MIGNON Allen  02/08/22 1305       MIGNON Allen  02/08/22 1317       Marvin Bolaños MD  02/08/22 1400

## 2022-02-08 NOTE — ANESTHESIA PREPROCEDURE EVALUATION
02/08/2022  Katie Quevedo is a 76 y.o., female; hx of COPD on baseline home O2 2L NC with stable bibasilar pleural effusions on CXR, C-spine and L-spine stenosis s/p ACDF and lumbar fusion, HTN, hx of Diastolic HF with preserved EF.     hx of ESRD on MWF HD, presented to HD this morning with clotted left AF graft - was told to present for declotting AVG. Hx of recurrent clotting of AV graft.             Past Surgical History:   Procedure Laterality Date    BLADDER SUSPENSION      CATARACT EXTRACTION  11/18/13    left eye    CERVICAL LAMINECTOMY      x3, fusion x1    COLONOSCOPY  2009    DECLOTTING OF ARTERIOVENOUS GRAFT Left 1/3/2022    Procedure: [id number];  Surgeon: Lindsey Louie MD;  Location: [facility] OR;  Service: Vascular;  Laterality: Left;    FISTULOGRAM N/A 2/27/2020    Procedure: Fistulogram;  Surgeon: Noe Benitez MD;  Location: [facility] CATH LAB/EP;  Service: Cardiology;  Laterality: N/A;    HYSTERECTOMY      JOINT REPLACEMENT  2001    total right knee     LUMBAR LAMINECTOMY      x 3, fusion x1    OOPHORECTOMY      PERIPHERAL ANGIOGRAPHY N/A 3/9/2020    Procedure: Peripheral angiography;  Surgeon: Jacinto Henriquez MD;  Location: [facility] CATH LAB/EP;  Service: Cardiology;  Laterality: N/A;    PLACEMENT OF ARTERIOVENOUS GRAFT Left 1/21/2020    Procedure: INSERTION, GRAFT, ARTERIOVENOUS;  Surgeon: Lindsey Louie MD;  Location: [facility] OR;  Service: General;  Laterality: Left;    THROMBECTOMY Left 2/3/2020    Procedure: THROMBECTOMY;  Surgeon: Lindsey Louie MD;  Location: [facility] OR;  Service: General;  Laterality: Left;    THROMBECTOMY  3/9/2020    Procedure: Thrombectomy;  Surgeon: Jacinto Henriquez MD;  Location: [facility] CATH LAB/EP;  Service: Cardiology;;     TTE 11/10/2020:    Summary    · The left ventricle is normal in size with hyperdynamic systolic function. The  estimated ejection fraction is 70-75%.  · There is left ventricular concentric remodeling.  · Indeterminate diastolic function.  · The estimated PA systolic pressure is 31 mmHg.  · Mild right ventricular enlargement with normal right ventricular systolic function.  · Normal central venous pressure (3 mmHg).    Pre-op Assessment    I have reviewed the Patient Summary Reports.     I have reviewed the Nursing Notes. I have reviewed the NPO Status.      Review of Systems  Anesthesia Hx:  No problems with previous Anesthesia Denies Hx of Anesthetic complications  History of prior surgery of interest to airway management or planning: Previous anesthesia: MAC   Social:  Former Smoker    Hematology/Oncology:     Oncology Normal    -- Anemia:   EENT/Dental:EENT/Dental Normal   Cardiovascular:   Hypertension CHF Orthopnea BERTRAND    Pulmonary:   COPD, moderate Shortness of breath    Renal/:   Chronic Renal Disease, ESRD, Dialysis    Musculoskeletal:   Arthritis  S/p cervical and lumbar fusion Spine Disorders: lumbar and cervical    Neurological:   Headaches Seizures    Endocrine:  Endocrine Normal        Physical Exam  General:  Cachexia    Airway/Jaw/Neck:  Airway Findings: Mouth Opening: Normal Tongue: Normal  General Airway Assessment: Adult  Mallampati: II  Improves to II with phonation.  TM Distance: 4 - 6 cm  Jaw/Neck Findings:  Neck ROM: Extension Decreased, Mild     Eyes/Ears/Nose:  Eyes/Ears/Nose Findings:    Dental:  DENTAL FINDINGS: Normal   Chest/Lungs:  Chest/Lungs Findings: On baseline 2L NC   Heart/Vascular:  Heart Findings: Normal    Abdomen:  Abdomen Findings: Normal      Mental Status:  Mental Status Findings:  Cooperative, Alert and Oriented         Anesthesia Plan  Type of Anesthesia, risks & benefits discussed:  Anesthesia Type:  MAC    Patient's Preference:   Plan Factors:          Intra-op Monitoring Plan: standard ASA monitors  Intra-op Monitoring Plan Comments:   Post Op Pain Control Plan: per primary  service following discharge from PACU and multimodal analgesia  Post Op Pain Control Plan Comments:     Induction:   IV  Beta Blocker:  Patient is not currently on a Beta-Blocker (No further documentation required).       Informed Consent: Patient understands risks and agrees with Anesthesia plan.  Questions answered. Anesthesia consent signed with patient.  ASA Score: 3     Day of Surgery Review of History & Physical:  There are no significant changes.  H&P update referred to the surgeon.         Ready For Surgery From Anesthesia Perspective.       Recent Labs   Lab 02/08/22  1041   WBC 9.23   HGB 12.9   HCT 41.7      *   K 5.4*      CREATININE 3.9*   BUN 72*   CO2 22*   ALT 15   AST 24

## 2022-02-08 NOTE — FIRST PROVIDER EVALUATION
" Emergency Department TeleTriage Encounter Note      CHIEF COMPLAINT    Chief Complaint   Patient presents with    Vascular Access Problem     [facility] [place] [facility] and was unable to be dialyzed d/t: " having a clot in my left arm." pt's access is to left, upper arm. She was told to come to ED to see MD Liban. Pt denies Chest pain or SOB. Hx of COPD and dialysis. Pt said it's happened before and access was: "cleaned out. I hope they can do that today." No distress noted during triage.        VITAL SIGNS   Initial Vitals [02/08/22 1013]   BP Pulse Resp Temp SpO2   (!) 140/68 82 20 98.5 °F (36.9 °C) 96 %      MAP       --            ALLERGIES    Review of patient's allergies indicates:   Allergen Reactions    Aspirin      Other reaction(s): hx of ulcers    Tetracycline Swelling     Other reaction(s): Swelling    Penicillins Rash     Other reaction(s): Hives  Other reaction(s): Rash  Other reaction(s): Rash  Other reaction(s): Hives       PROVIDER TRIAGE NOTE  Patient is a 76-year-old female who presents with hemodialysis access issues.  She reports having similar issues approximately a month ago where she had to have a clot removed.  She denies any complaints.      Initial orders will be placed and care will be transferred to an alternate provider when patient is roomed for a full evaluation. Any additional orders and the final disposition will be determined by that provider.        ORDERS  Labs Reviewed   CBC W/ AUTO DIFFERENTIAL   COMPREHENSIVE METABOLIC PANEL   MAGNESIUM   PHOSPHORUS   SARS-COV-2 RDRP GENE       ED Orders (720h ago, onward)    Start Ordered     Status Ordering Provider    02/08/22 1030 02/08/22 1029  CBC auto differential  STAT         Ordered ZUNILDA COREA    02/08/22 1030 02/08/22 1029  Comprehensive metabolic panel  STAT         Ordered ZUNILDA COREA    02/08/22 1030 02/08/22 1029  Magnesium  STAT         Ordered HEATH KHAN, " ZUNILDA    02/08/22 1030 02/08/22 1029  Phosphorus  Once         Ordered GAVIOREN MACHADOTHERZUNILDA    02/08/22 1030 02/08/22 1029  POCT COVID-19 Rapid Screening  Once         Ordered GAVIOREN ERIN, ZUNILDA            Virtual Visit Note: The provider triage portion of this emergency department evaluation and documentation was performed via [employer], a HIPAA-compliant telemedicine application, in concert with a tele-presenter in the room. A face to face patient evaluation with one of my colleagues will occur once the patient is placed in an emergency department room.      DISCLAIMER: This note was prepared with [email or web address] voice recognition transcription software. Garbled syntax, mangled pronouns, and other bizarre constructions may be attributed to that software system.

## 2022-02-08 NOTE — H&P
Today`s Date: 2/8/2022     Admit Date: 2/8/2022    Admitting Physician: Marvin Bolaños, *    Patient`s Name: Katie Quevedo , 76 y.o. female    HISTORY AND CHIEF COMPLAINT  Admitted with recurrent clotted access left upper arm   Patient Active Problem List   Diagnosis    Melanoma    Chronic pain    Vitamin deficiency    Cervical post-laminectomy syndrome    Lumbar postlaminectomy syndrome    Lumbosacral spondylosis without myelopathy    Isolated cervical dystonia    Primary osteoarthritis of both knees    Subdeltoid bursitis, L>R.    Other fragments of torsion dystonia    Nuclear sclerosis - Both Eyes    Rotator cuff tear, right    Biceps tendonitis    Osteoarthritis, hip, bilateral    Lumbar radiculopathy, BLE    Cervical radiculopathy, BUE    Osteoporosis    Iron deficiency anemia    Essential hypertension    Atrophy of left kidney    Kidney cysts    History of colon polyps    Pleural effusion, left    Pericardial effusion    Shortness of breath    Chronic respiratory failure with hypoxia, on home O2 therapy    Anemia, chronic renal failure, stage 4 (severe)    Lipoma of torso    Chronic diastolic heart failure    COPD (chronic obstructive pulmonary disease)    Non-intractable vomiting with nausea    Diastolic dysfunction, left ventricle    Acute congestive heart failure    ESRD (end stage renal disease) on dialysis    Diarrhea    Dialysis AV fistula malfunction, sequela    Medication management    SOB (shortness of breath)    Acute on chronic heart failure    Pleural effusion    Orthopnea    Secondary hyperparathyroidism    ESRD (end stage renal disease)    Clotted dialysis access       Past Medical History:   Diagnosis Date    Acute congestive heart failure 02/10/2020    Anemia     Bilateral renal cysts     Cataract     Chronic LBP 7/26/2012    Chronic pain     CKD (chronic kidney disease), stage IV     Colon polyp 2013    COPD (chronic obstructive  pulmonary disease)     Dehydration     Encounter for blood transfusion     HTN (hypertension)     Lumbar spondylosis     Melanoma     of the lip    Metabolic bone disease     Migraines, neuralgic     Osteoporosis     Primary osteoarthritis of both knees     s/p Rt TKA    Pulmonary embolism with infarction     Seizures 1972    x1 only    Subdeltoid bursitis, L>R. 9/27/2012    Ulcer     Vitamin D deficiency disease        Past Surgical History:   Procedure Laterality Date    BLADDER SUSPENSION      CATARACT EXTRACTION  11/18/13    left eye    CERVICAL LAMINECTOMY      x3, fusion x1    COLONOSCOPY  2009    DECLOTTING OF ARTERIOVENOUS GRAFT Left 1/3/2022    Procedure: [id number];  Surgeon: Lindsey Louie MD;  Location: [facility] OR;  Service: Vascular;  Laterality: Left;    FISTULOGRAM N/A 2/27/2020    Procedure: Fistulogram;  Surgeon: Noe Benitez MD;  Location: [facility] CATH LAB/EP;  Service: Cardiology;  Laterality: N/A;    HYSTERECTOMY      JOINT REPLACEMENT  2001    total right knee     LUMBAR LAMINECTOMY      x 3, fusion x1    OOPHORECTOMY      PERIPHERAL ANGIOGRAPHY N/A 3/9/2020    Procedure: Peripheral angiography;  Surgeon: Jacinto Henriquez MD;  Location: [facility] CATH LAB/EP;  Service: Cardiology;  Laterality: N/A;    PLACEMENT OF ARTERIOVENOUS GRAFT Left 1/21/2020    Procedure: INSERTION, GRAFT, ARTERIOVENOUS;  Surgeon: Lindsey Louie MD;  Location: [facility] OR;  Service: General;  Laterality: Left;    THROMBECTOMY Left 2/3/2020    Procedure: THROMBECTOMY;  Surgeon: Lindsey Louie MD;  Location: [facility] OR;  Service: General;  Laterality: Left;    THROMBECTOMY  3/9/2020    Procedure: Thrombectomy;  Surgeon: Jacinto Henriquez MD;  Location: [facility] CATH LAB/EP;  Service: Cardiology;;       Prior to Admission medications    Medication Sig Start Date End Date Taking? Authorizing Provider   acetaminophen (TYLENOL) 325 MG tablet Take 1 tablet (325 mg total) by mouth every 6  (six) hours as needed for Pain. 1/3/22   Lindsey Louie MD   albuterol (VENTOLIN HFA) 90 mcg/actuation inhaler Inhale 2 puffs into the lungs every 6 (six) hours as needed for Wheezing or Shortness of Breath. Rescue 12/8/20   Rona Ramsey NP   albuterol-ipratropium (DUO-NEB) 2.5 mg-0.5 mg/3 mL nebulizer solution Take 3 mLs by nebulization every 6 (six) hours as needed for Shortness of Breath. Rescue 10/29/21 10/29/22  Giancarlo Rust MD   allopurinoL (ZYLOPRIM) 100 MG tablet Take 1 tablet (100 mg total) by mouth on Tuesday, Thursday, Saturday, and Sunday. 2/14/20   Aura Diggs MD   amLODIPine (NORVASC) 5 MG tablet  4/16/21   Historical Provider   B complex-vitamin C-folic acid (LINDA-RADHA) 0.8 mg Tab Take 1 tablet (0.8 mg total) by mouth once daily. 11/12/20   Rachel Alexander NP   busPIRone (BUSPAR) 10 MG tablet  5/18/21   Historical Provider   clonazePAM (KLONOPIN) 1 MG tablet Take 1 tablet (1 mg total) by mouth daily as needed (on dialysis days for anxiety.). 3/16/21   Bert Booker MD   clonazePAM (KLONOPIN) 1 MG tablet TAKE 1 TABLET BY MOUTH 3 TIMES WEEKLY ON DIALYSIS DAYS 1/28/22      diazePAM (VALIUM) 2 MG tablet TAKE 1 TABLET EVERY DAY AS NEEDED FOR ANXIETY 3/23/21   Historical Provider   diclofenac sodium (VOLTAREN) 1 % Gel Apply 2 g topically 3 (three) times daily as needed (apply). 10/28/21 12/18/21  Pushpa Mcmahon MD   diphenhydrAMINE (BENADRYL) 25 mg capsule Take 25 mg by mouth every 6 (six) hours as needed for Itching.    Historical Provider   epoetin hemant-epbx (RETACRIT) 10,000 unit/mL imjection Inject 0.5 mLs (5,000 Units total) into the skin every Mon, Wed, Fri. 3/2/20   Ramos Navarro,    ergocalciferol (ERGOCALCIFEROL) 50,000 unit Cap Take 1 capsule by mouth once weekly for 10 weeks, then take 1 capsule by mouth once monthly. 2/14/20   Aura Diggs MD   EScitalopram oxalate (LEXAPRO) 10 MG tablet Take 1 tablet (10 mg total) by mouth once daily. 2/11/21  2/11/22  Kingston Verduzco MD   fluticasone-umeclidin-vilanter (TRELEGY ELLIPTA) 200-62.5-25 mcg inhaler Inhale 1 puff into the lungs once daily. 3/30/21   Kingston Verduzco MD   HYDROcodone-acetaminophen (NORCO)  mg per tablet Take 1 tablet by mouth 3 (three) times daily as needed for Pain. 1/14/22 2/13/22  Pushpa Mcmahon MD   hydrOXYzine pamoate (VISTARIL) 50 MG Cap TAKE 1 CAPSULE TWICE DAILY FOR ANXIETY 4/2/21   Historical Provider   levoFLOXacin (LEVAQUIN) 500 MG tablet Take every other day after returning home from dialysis. 8/10/21   Giancarlo Rust MD   lidocaine-prilocaine (EMLA) cream Apply topically to left arm prior to dialysis. 2/14/20   Aura Diggs MD   megestroL (MEGACE) 40 MG Tab take  1 tablet by mouth every day 9/16/20   Aura Diggs MD   megestroL (MEGACE) 40 MG Tab take 1 tablet by mouth daily 6/28/21   Aura Diggs MD   mirtazapine (REMERON) 15 MG tablet Take 1 tablet (15 mg total) by mouth every evening. 10/20/21 10/20/22  Sonu Martínez MD   morphine 2 mg/mL injection  2/25/21   Historical Provider   mupirocin (BACTROBAN) 2 % ointment apply by Nasal route 2 (two) times daily. 11/12/20   Rachel Alexander NP   ondansetron (ZOFRAN-ODT) 4 MG TbDL Dissolve one tablet under the tongue every 6 (six) hours as needed. 9/16/19   Jere Mercado NP   potassium chloride SA (K-DUR,KLOR-CON) 20 MEQ tablet Take 1 tablet (20 mEq total) by mouth every Tuesday, Thursday, Saturday, Sunday. 11/12/20   Rachel Alexander NP   predniSONE (DELTASONE) 20 MG tablet Take 1 tablet (20 mg total) by mouth once daily. 8/10/21   Giancarlo Rust MD   pregabalin (LYRICA) 25 MG capsule Take 1 capsule (25 mg total) by mouth 2 (two) times daily. In 1-2 weeks, if no relief, may increase dose to 3 times per day. 12/8/21 6/8/22  Pushpa Mcmahon MD   sodium bicarbonate 650 MG tablet Take 1 tablet (650 mg total) by mouth 2 (two) times daily.  Patient not taking: Reported  on 3/30/2021 7/29/19 3/16/21  Aura Diggs MD   zolpidem (AMBIEN) 5 MG Tab Take 5 mg by mouth nightly. 11/11/20   Historical Provider     No current facility-administered medications on file prior to encounter.     Current Outpatient Medications on File Prior to Encounter   Medication Sig Dispense Refill    acetaminophen (TYLENOL) 325 MG tablet Take 1 tablet (325 mg total) by mouth every 6 (six) hours as needed for Pain. 10 tablet 10    albuterol (VENTOLIN HFA) 90 mcg/actuation inhaler Inhale 2 puffs into the lungs every 6 (six) hours as needed for Wheezing or Shortness of Breath. Rescue 18 g 3    albuterol-ipratropium (DUO-NEB) 2.5 mg-0.5 mg/3 mL nebulizer solution Take 3 mLs by nebulization every 6 (six) hours as needed for Shortness of Breath. Rescue 180 mL 11    allopurinoL (ZYLOPRIM) 100 MG tablet Take 1 tablet (100 mg total) by mouth on Tuesday, Thursday, Saturday, and Sunday. 30 tablet 0    amLODIPine (NORVASC) 5 MG tablet       B complex-vitamin C-folic acid (LINDA-RADHA) 0.8 mg Tab Take 1 tablet (0.8 mg total) by mouth once daily. 30 tablet 11    busPIRone (BUSPAR) 10 MG tablet       clonazePAM (KLONOPIN) 1 MG tablet Take 1 tablet (1 mg total) by mouth daily as needed (on dialysis days for anxiety.). 20 tablet 1    clonazePAM (KLONOPIN) 1 MG tablet TAKE 1 TABLET BY MOUTH 3 TIMES WEEKLY ON DIALYSIS DAYS 15 tablet 2    diazePAM (VALIUM) 2 MG tablet TAKE 1 TABLET EVERY DAY AS NEEDED FOR ANXIETY      diclofenac sodium (VOLTAREN) 1 % Gel Apply 2 g topically 3 (three) times daily as needed (apply). 300 g 3    diphenhydrAMINE (BENADRYL) 25 mg capsule Take 25 mg by mouth every 6 (six) hours as needed for Itching.      epoetin hemant-epbx (RETACRIT) 10,000 unit/mL imjection Inject 0.5 mLs (5,000 Units total) into the skin every Mon, Wed, Fri.      ergocalciferol (ERGOCALCIFEROL) 50,000 unit Cap Take 1 capsule by mouth once weekly for 10 weeks, then take 1 capsule by mouth once monthly. 30 capsule 0     EScitalopram oxalate (LEXAPRO) 10 MG tablet Take 1 tablet (10 mg total) by mouth once daily. 30 tablet 11    fluticasone-umeclidin-vilanter (TRELEGY ELLIPTA) 200-62.5-25 mcg inhaler Inhale 1 puff into the lungs once daily. 60 each 11    HYDROcodone-acetaminophen (NORCO)  mg per tablet Take 1 tablet by mouth 3 (three) times daily as needed for Pain. 90 tablet 0    hydrOXYzine pamoate (VISTARIL) 50 MG Cap TAKE 1 CAPSULE TWICE DAILY FOR ANXIETY      levoFLOXacin (LEVAQUIN) 500 MG tablet Take every other day after returning home from dialysis. 4 tablet 0    lidocaine-prilocaine (EMLA) cream Apply topically to left arm prior to dialysis. 30 g 0    megestroL (MEGACE) 40 MG Tab take  1 tablet by mouth every day 30 tablet 1    megestroL (MEGACE) 40 MG Tab take 1 tablet by mouth daily 30 tablet 0    mirtazapine (REMERON) 15 MG tablet Take 1 tablet (15 mg total) by mouth every evening. 30 tablet 11    morphine 2 mg/mL injection       mupirocin (BACTROBAN) 2 % ointment apply by Nasal route 2 (two) times daily. 22 g 0    ondansetron (ZOFRAN-ODT) 4 MG TbDL Dissolve one tablet under the tongue every 6 (six) hours as needed. 30 tablet 3    potassium chloride SA (K-DUR,KLOR-CON) 20 MEQ tablet Take 1 tablet (20 mEq total) by mouth every Tuesday, Thursday, Saturday, Sunday. 20 tablet 0    predniSONE (DELTASONE) 20 MG tablet Take 1 tablet (20 mg total) by mouth once daily. 7 tablet 0    pregabalin (LYRICA) 25 MG capsule Take 1 capsule (25 mg total) by mouth 2 (two) times daily. In 1-2 weeks, if no relief, may increase dose to 3 times per day. 90 capsule 1    sodium bicarbonate 650 MG tablet Take 1 tablet (650 mg total) by mouth 2 (two) times daily. (Patient not taking: Reported on 3/30/2021) 120 tablet 11    zolpidem (AMBIEN) 5 MG Tab Take 5 mg by mouth nightly.          Review of patient's allergies indicates:   Allergen Reactions    Aspirin      Other reaction(s): hx of ulcers    Tetracycline Swelling      Other reaction(s): Swelling    Penicillins Rash     Other reaction(s): Hives  Other reaction(s): Rash  Other reaction(s): Rash  Other reaction(s): Hives       Social History:   reports that she has quit smoking. Her smoking use included cigarettes. She quit smokeless tobacco use about 7 years ago. She reports previous alcohol use. She reports that she does not use drugs.     Family History   Problem Relation Age of Onset    Arthritis Mother     Stroke Mother     Hypertension Father     Cancer Father     Cataracts Father     Diabetes Maternal Aunt     Hypertension Maternal Grandfather     Heart disease Maternal Grandfather     Heart attack Maternal Grandfather     Cataracts Sister     Glaucoma Cousin        PHYSICAL EXAMINATION  Temp:  [98.5 °F (36.9 °C)] 98.5 °F (36.9 °C)  Pulse:  [82] 82  Resp:  [20] 20  SpO2:  [96 %] 96 %  BP: (140)/(68) 140/68    General Condition:   alert x 3    Head & Neck  Anemia: None  Jaundice: None  Neck vein: Not distended  Carotid Bruits: none  Lymph nodes: none palpable  Thyroid: normal    Chest: normal    Heart: normal    Rt. Breast: not examined  Lt. Breast: not examined  Axillary lymph nodes: none    Abdomen: Soft,  None tender with no palpable mass or organ  Hernia: none    Rectal: Defered    Extremities: normal    Vascular: normal    Specific focus Examination  Clotted dialysis access    Imp: clotted dialysis access, crf 5, htn dm    Plan: declotting with possible revision.

## 2022-02-08 NOTE — ED NOTES
Report received. Care assumed. Pt AAOx4. Respirations even and unlabored. Pt VSS.  Will continue to monitor.

## 2022-02-09 DIAGNOSIS — G89.29 CHRONIC NECK PAIN: ICD-10-CM

## 2022-02-09 DIAGNOSIS — M16.0 PRIMARY OSTEOARTHRITIS OF BOTH HIPS: ICD-10-CM

## 2022-02-09 DIAGNOSIS — M54.2 CHRONIC NECK PAIN: ICD-10-CM

## 2022-02-09 DIAGNOSIS — G89.29 CHRONIC MIDLINE LOW BACK PAIN WITH RIGHT-SIDED SCIATICA: ICD-10-CM

## 2022-02-09 DIAGNOSIS — M54.41 CHRONIC MIDLINE LOW BACK PAIN WITH RIGHT-SIDED SCIATICA: ICD-10-CM

## 2022-02-09 RX ORDER — HYDROCODONE BITARTRATE AND ACETAMINOPHEN 10; 325 MG/1; MG/1
1 TABLET ORAL 3 TIMES DAILY PRN
Qty: 90 TABLET | Refills: 0 | Status: SHIPPED | OUTPATIENT
Start: 2022-02-11 | End: 2022-02-14 | Stop reason: SDUPTHER

## 2022-02-09 NOTE — TELEPHONE ENCOUNTER
----- Message from Uzair Kim sent at 2/9/2022  4:07 PM CST -----  Contact: @ [phone]  Rx Refill/Request     Is this a Refill or New Rx:  yes   Rx Name and Strength: HYDROcodone-acetaminophen (NORCO)  mg per tablet   Preferred Pharmacy with phone number:     [facility]  [address] [place] [address]  [place] [id number]  Phone: [phone] Fax: [phone]

## 2022-02-09 NOTE — TRANSFER OF CARE
Anesthesia Transfer of Care Note    Patient: Katie Quevedo    Procedure(s) Performed: Procedure(s) (LRB):  [id number] (Left)    Patient location: PACU    Anesthesia Type: MAC    Transport from OR: Transported from OR on 2-3 L/min O2 by NC with adequate spontaneous ventilation    Post pain: adequate analgesia    Post assessment: no apparent anesthetic complications and tolerated procedure well    Post vital signs: stable    Level of consciousness: awake, alert and oriented    Nausea/Vomiting: no nausea/vomiting    Complications: none    Transfer of care protocol was followed      Last vitals:   Visit Vitals  BP (!) 109/54   Pulse 84   Temp 36.6 °C (97.8 °F) (Oral)   Resp (!) 26   Wt 43.1 kg (95 lb)   LMP  (LMP Unknown)   SpO2 100%   Breastfeeding No   BMI 17.38 kg/m²

## 2022-02-09 NOTE — PROGRESS NOTES
Pt states she would like to go home and go to her scheduled outpatient dialysis tomorrow.  Dr. Louie notified, stated ok to discharge now

## 2022-02-09 NOTE — OP NOTE
[place] [facility] (H[facility])  Operative Note      Date of Procedure: 2/8/2022     Procedure: Procedure(s) (LRB):  [id number] (Left)     Surgeon(s) and Role:     * Lindsey Louie MD - Primary    Assisting Surgeon: None    Pre-Operative Diagnosis: Clotted dialysis access, subsequent encounter [T82.49XD]  ESRD (end stage renal disease) on dialysis [N18.6, Z99.2]    Post-Operative Diagnosis: Post-Op Diagnosis Codes:     * Clotted dialysis access, subsequent encounter [T82.49XD]     * ESRD (end stage renal disease) on dialysis [N18.6, Z99.2]    Anesthesia: Local MAC    Operative Findings (including complications, if any): declotting thrombectomy graft left upper arm done under mac anaesthesia , patient tolerated well and was sent to recovery room in stable condition.    Description of Technical Procedures:   Operative Note       Surgery Date: 2/8/2022     Surgeon(s) and Role:     * Lindsey Louie MD - Primary    Pre-op Diagnosis:  Clotted dialysis access, subsequent encounter [T82.49XD]  ESRD (end stage renal disease) on dialysis [N18.6, Z99.2]    Post-op Diagnosis: Post-Op Diagnosis Codes:     * Clotted dialysis access, subsequent encounter [T82.49XD]     * ESRD (end stage renal disease) on dialysis [N18.6, Z99.2]    Procedure(s) (LRB):  DRDEROYPUS-NBIHI-OL (Left)    Anesthesia: Local MAC    Procedure in Detail/Findings:    After satisfactory IV sedation, the patient in supine   position, left upper arm and forearm was prepped and draped in normal sterile   manner using ChloraPrep.  A stockinette was applied.  A timeout was called and   extremity was confirmed.  The area at the takeoff of the AV fistula, left upper   arm was infiltrated using 1% Xylocaine solution.  Incision was carried down to   the deep subcutaneous tissues.  Subcutaneous bleeders clamped and bovied and   further taken down.  Short segment of graft was isolated.  Proximal and distal   control was obtained.  Graft incision was made.   Crystal catheter first induced   on the venous side.  Good backward flow was obtained.  The patient was   heparinized using 3000 units of heparin.  Crystal catheter was then introduced   on the arterial side and excellent forward flow was obtained.  The graft   incision was closed using running 5-0 Prolene suture.  The patient had good   bruit after completion of procedure.  Hemostasis satisfactorily maintained.    Wound was irrigated with antibiotic solution and then approximated using 3-0   Vicryl for subcutaneous tissue.  The skin was closed using running 4-0 nylon   suture.  Sterile gauze dressing was applied.  Instrument count, sponge count and   needle count was correct.  The patient tolerated it well.  Estimated blood loss   was 50 mL.  Specimen removed was thrombus.  No intraoperative complications.    Intraoperative finding is clotted left upper arm access.  The patient was sent   to the Recovery Room in stable condition.          Estimated Blood Loss: 50 cc         Specimens (From admission, onward)    None        Implants: * No implants in log *           Disposition: PACU - hemodynamically stable.           Condition: Good    Attestation:  I performed the procedure.           Discharge Note    Admit Date: 2/8/2022    Attending Physician: No att. providers found     Discharge Physician: No att. providers found    Final Diagnosis: Post-Op Diagnosis Codes:     * Clotted dialysis access, subsequent encounter [T82.49XD]     * ESRD (end stage renal disease) on dialysis [N18.6, Z99.2]    Disposition: Home or Self Care    Patient Instructions:   Current Discharge Medication List      START taking these medications    Details   acetaminophen (TYLENOL) 325 MG tablet Take 1 tablet (325 mg total) by mouth every 6 (six) hours as needed for Pain.  Qty: 10 tablet, Refills: 0         CONTINUE these medications which have NOT CHANGED    Details   albuterol (VENTOLIN HFA) 90 mcg/actuation inhaler Inhale 2 puffs into the  lungs every 6 (six) hours as needed for Wheezing or Shortness of Breath. Rescue  Qty: 18 g, Refills: 3    Comments: May substitute for insurance needs  Associated Diagnoses: Centrilobular emphysema      albuterol-ipratropium (DUO-NEB) 2.5 mg-0.5 mg/3 mL nebulizer solution Take 3 mLs by nebulization every 6 (six) hours as needed for Shortness of Breath. Rescue  Qty: 180 mL, Refills: 11      allopurinoL (ZYLOPRIM) 100 MG tablet Take 1 tablet (100 mg total) by mouth on Tuesday, Thursday, Saturday, and Sunday.  Qty: 30 tablet, Refills: 0      B complex-vitamin C-folic acid (LINDA-RADHA) 0.8 mg Tab Take 1 tablet (0.8 mg total) by mouth once daily.  Qty: 30 tablet, Refills: 11      busPIRone (BUSPAR) 10 MG tablet Take 10 mg by mouth once daily.      clonazePAM (KLONOPIN) 1 MG tablet TAKE 1 TABLET BY MOUTH 3 TIMES WEEKLY ON DIALYSIS DAYS  Qty: 15 tablet, Refills: 2      diclofenac sodium (VOLTAREN) 1 % Gel Apply 2 g topically 3 (three) times daily as needed (apply).  Qty: 300 g, Refills: 3      diphenhydrAMINE (BENADRYL) 25 mg capsule Take 25 mg by mouth every 6 (six) hours as needed for Itching.      epoetin hemant-epbx (RETACRIT) 10,000 unit/mL imjection Inject 0.5 mLs (5,000 Units total) into the skin every Mon, Wed, Fri.      fluticasone-umeclidin-vilanter (TRELEGY ELLIPTA) 200-62.5-25 mcg inhaler Inhale 1 puff into the lungs once daily.  Qty: 60 each, Refills: 11      HYDROcodone-acetaminophen (NORCO)  mg per tablet Take 1 tablet by mouth 3 (three) times daily as needed for Pain.  Qty: 90 tablet, Refills: 0    Comments: Medically necessary > 7 days due to chronic pain.  Associated Diagnoses: Chronic midline low back pain with right-sided sciatica; Chronic neck pain; Primary osteoarthritis of both hips      mirtazapine (REMERON) 15 MG tablet Take 1 tablet (15 mg total) by mouth every evening.  Qty: 30 tablet, Refills: 11      zolpidem (AMBIEN) 5 MG Tab Take 5 mg by mouth nightly.      levoFLOXacin (LEVAQUIN) 500 MG  tablet Take every other day after returning home from dialysis.  Qty: 4 tablet, Refills: 0      lidocaine-prilocaine (EMLA) cream Apply topically to left arm prior to dialysis.  Qty: 30 g, Refills: 0             Discharge Procedure Orders (must include Diet, Follow-up, Activity)   Discharge Procedure Orders (must include Diet, Follow-up, Activity)   Diet general     Keep surgical extremity elevated     Lifting restrictions     Call MD for:  temperature >100.4     Call MD for:  persistent nausea and vomiting     Call MD for:  severe uncontrolled pain     Call MD for:  redness, tenderness, or signs of infection (pain, swelling, redness, odor or green/yellow discharge around incision site)     Leave dressing on - Keep it clean, dry, and intact until clinic visit        Discharge Date: No discharge date for patient encounter.      Significant Surgical Tasks Conducted by the Assistant(s), if Applicable: na    Estimated Blood Loss (EBL): 50 cc         Implants: * No implants in log *    Specimens:   Specimen (24h ago, onward)            None                  Condition: Good    Disposition: PACU - hemodynamically stable.    Attestation: I performed the procedure.    Discharge Note    OUTCOME: Patient tolerated treatment/procedure well without complication and is now ready for discharge.    DISPOSITION: Home or Self Care    FINAL DIAGNOSIS:  <principal problem not specified>    FOLLOWUP: In clinic 7 days    DISCHARGE INSTRUCTIONS:    Discharge Procedure Orders   Diet general     Keep surgical extremity elevated     Lifting restrictions     Call MD for:  temperature >100.4     Call MD for:  persistent nausea and vomiting     Call MD for:  severe uncontrolled pain     Call MD for:  redness, tenderness, or signs of infection (pain, swelling, redness, odor or green/yellow discharge around incision site)     Leave dressing on - Keep it clean, dry, and intact until clinic visit

## 2022-02-09 NOTE — ANESTHESIA POSTPROCEDURE EVALUATION
Anesthesia Post Evaluation    Patient: Katie Quevedo    Procedure(s) Performed: Procedure(s) (LRB):  [id number] (Left)    Final Anesthesia Type: MAC      Patient location during evaluation: PACU  Patient participation: Yes- Able to Participate  Level of consciousness: awake and alert  Post-procedure vital signs: reviewed and stable  Pain management: adequate  Airway patency: patent    PONV status at discharge: No PONV  Anesthetic complications: no      Cardiovascular status: blood pressure returned to baseline  Respiratory status: unassisted  Hydration status: euvolemic  Follow-up not needed.          Vitals Value Taken Time   /58 02/08/22 2030   Temp 36.7 °C (98 °F) 02/08/22 2020   Pulse 88 02/08/22 2030   Resp 16 02/08/22 2030   SpO2 96 % 02/08/22 2030         Event Time   Out of Recovery 20:15:00         Pain/Nelson Score: Nelson Score: 9 (2/8/2022  8:35 PM)

## 2022-02-09 NOTE — PLAN OF CARE
Pt and spouse given AVS and educated on discharge instructions.  Verbalized understanding of teaching.  No questions or concerns at this time.  VSS. Denies pain and n/v.  Dressing to LUE CDI.  Pt on 2L NC at home.  IVs removed.  Discharge to home

## 2022-02-14 DIAGNOSIS — M54.41 CHRONIC MIDLINE LOW BACK PAIN WITH RIGHT-SIDED SCIATICA: ICD-10-CM

## 2022-02-14 DIAGNOSIS — M54.2 CHRONIC NECK PAIN: ICD-10-CM

## 2022-02-14 DIAGNOSIS — M16.0 PRIMARY OSTEOARTHRITIS OF BOTH HIPS: ICD-10-CM

## 2022-02-14 DIAGNOSIS — G89.29 CHRONIC MIDLINE LOW BACK PAIN WITH RIGHT-SIDED SCIATICA: ICD-10-CM

## 2022-02-14 DIAGNOSIS — G89.29 CHRONIC NECK PAIN: ICD-10-CM

## 2022-02-14 RX ORDER — HYDROCODONE BITARTRATE AND ACETAMINOPHEN 10; 325 MG/1; MG/1
1 TABLET ORAL 3 TIMES DAILY PRN
Qty: 90 TABLET | Refills: 0 | Status: SHIPPED | OUTPATIENT
Start: 2022-02-14 | End: 2022-03-15 | Stop reason: SDUPTHER

## 2022-02-14 NOTE — TELEPHONE ENCOUNTER
----- Message from Zainab Vail sent at 2/14/2022  9:49 AM CST -----  Regarding: pt  Rx Refill/Request     Is this a Refill or New Rx:  Refill  Rx Name and Strength:  HYDROcodone-acetaminophen (NORCO)  mg per tablet  Preferred Pharmacy with phone number: [facility] (Ph: [phone])  Communication Preference: can you please call pt at [phone]  Additional Information: none     GINA

## 2022-03-15 DIAGNOSIS — G89.29 CHRONIC MIDLINE LOW BACK PAIN WITH RIGHT-SIDED SCIATICA: ICD-10-CM

## 2022-03-15 DIAGNOSIS — M54.2 CHRONIC NECK PAIN: ICD-10-CM

## 2022-03-15 DIAGNOSIS — M16.0 PRIMARY OSTEOARTHRITIS OF BOTH HIPS: ICD-10-CM

## 2022-03-15 DIAGNOSIS — G89.29 CHRONIC NECK PAIN: ICD-10-CM

## 2022-03-15 DIAGNOSIS — M54.41 CHRONIC MIDLINE LOW BACK PAIN WITH RIGHT-SIDED SCIATICA: ICD-10-CM

## 2022-03-15 RX ORDER — HYDROCODONE BITARTRATE AND ACETAMINOPHEN 10; 325 MG/1; MG/1
1 TABLET ORAL 3 TIMES DAILY PRN
Qty: 90 TABLET | Refills: 0 | Status: SHIPPED | OUTPATIENT
Start: 2022-03-17 | End: 2022-04-17

## 2022-03-15 NOTE — TELEPHONE ENCOUNTER
----- Message from Summer Gill sent at 3/15/2022  9:53 AM CDT -----  Regarding: refill  Contact: pt @ [phone]  Rx Refill/Request     Is this a Refill or New Rx:  refill    Rx Name and Strength:  HYDROcodone-acetaminophen (NORCO)  mg per tablet    Preferred Pharmacy with phone number:     [facility]  [address] [address]  Phone: [phone] Fax: [phone]    Communication Preference:pt @ [phone]  Additional Information:

## 2022-03-25 NOTE — PROGRESS NOTES
[facility], a part of [facility] [address]   [id number] [facility]. [facility], 1 [facility]                                                    Physical Therapy  Daily Note     Patient Name: Cristel Eduardo  Date:3/25/2022  : 2021  [x]  Patient  Verified  Payor: Joseph Villa / Plan: Margjeremie Lesser / Product Type: HMO /    In time: 1100  Out time: 1230  Total Treatment Time (min): 90  Total Timed Codes (min): 90    Treatment Area: Muscle weakness [M62.81]  Lack of coordination [R27.9]    Visit Type:  [x] Intensive   [] Outpatient  [] Clinic:    Certification Period: 11/3/21-11/3/22    SUBJECTIVE    Pain Level Before Treatment: [x] FLACC (If applicable, see box) score:     Start of Session  During  Activities End of Session    Face  0 0-1 0   Legs  0 0-1 0   Activity  0 0-1 0   Cry  0 0-1 0   Consolability  0 0-1 0   Total  0 0-5 0       Any medication changes, allergies to medications, adverse drug reactions, diagnosis change, or new procedure performed?: [x] No    [] Yes (see summary sheet for update)  Subjective functional status/changes:   [x] No changes reported  Omid arrived to PT with Mom who was present and interactive during the session. She reported that Omid had a good night. Kyree was generally agreeable during the session today, however with periods of fussing noted.     OBJECTIVE     min Therapeutic Exercise:  [] See flow sheet :   Rationale: increase ROM, increase strength, improve coordination, improve balance and increase proprioception to improve the patients ability to achieve their functional goals       75 min Neuromuscular Re-education:  []  See flow sheet    Rationale: Improve muscle re-education of movement, balance, coordination, kinesthetic sense, posture, and proprioception to improve the patient's ability to achieve their functional goals     min Manual Therapy:  See flowsheet   Rationale: decrease pain, increase ROM, increase tissue extensibility, Subjective:       Patient ID: Katie Quevedo is a 73 y.o. female.    Chief Complaint: No chief complaint on file.    HPI    Mrs. Quevedo is a 73-year-old white female with past medical history of pulmonary   embolism, on Eliquis therapy and osteoarthritis who is followed up in the   Physical Medicine Clinic for chronic low back pain with lumbar radiculopathy,   post-laminectomy syndrome, chronic neck pain with cervical radiculopathy,   post-cervical laminectomy syndrome and recurrent right subdeltoid bursitis.  Her   last visit to the clinic was on 04/09/2018.  She was maintained on p.r.n.   hydrocodone/APAP, p.r.n. tizanidine and p.r.n. trazodone.    The patient is coming today to the clinic for followup.  Her back pain has been   slightly worse.  It is a constant achy pain in the lumbar spine and across her   back.  The pain radiates to both hips and intermittently to the right foot with   numbness.  Her maximum pain is 10/10 and minimum 6/10.  Today, it is 8/10.  The   patient complains of bilateral lower extremity weakness, but without change from   baseline.  She denies any bowel or bladder incontinence.    Her neck pain has been the same.  It is a constant achy pain in the cervical   spine and across her upper back.  She denies any radiation to her arms.  Her   maximum neck pain is 10/10 and minimum 5/10.  Today, it is 5/10.  The patient   complains of right hand  weakness.  She has gait imbalance, but she has been   using an electric scooter for her mobility.    She is currently taking hydrocodone/APAP 10/325 p.r.n., usually 3 times per day.    She takes tizanidine 4 mg p.o. 3 times per day as needed.  She discussed   adding Cymbalta with her psychiatrist and he agreed.  She continues to use   Voltaren gel topically to her painful joints.      MS/IN  dd: 07/05/2018 14:53:58 (CDT)  td: 07/06/2018 02:28:31 (CDT)  Doc ID   #[id number]  Job ID #[id number]    CC:           Review of Systems   Constitutional: Positive for  decrease trigger points and increase postural awareness to work towards their functional goals     15 min Gait Training:        min Therapeutic Activities: See Flowsheet   Rationale: to use dynamic activity to improve functional performance and transfers  nale        With   [] TE   [] neuro   [x] other: throughout the session Patient Education: [x] Review HEP. Mom to continue to work on transitions up to sitting and sitting balance activities. [] Progressed/Changed HEP based on:   [] positioning   [] body mechanics   [] transfers   [] heat/ice application  [x]  Reviewed session with caregiver during the session    [] other: proper use of Zing stander-- donated to family     Objective/Functional Measures  Vestibular Input -Platform swing with [employer] Seat, blue wrap at upper chest and CGA. Performed 60 seconds in all directions: ant/post, med/lat, diagonals, clockwise/counterclockwise for a total of 6min.      Reflex Integration/RMTI -LE embrace and squeeze x3 bilaterally  -LE grounding x3 bilaterally  -UE embrace and squeeze x3 bilaterally   Mat Activities -   Sitting activities -sitting EOM between transitional skills with min A provided at her pelvis and Brinley actively working on maintaining head and trunk upright  -tailor sitting on the mat table with min A for stability at her hips/low back and Brinley actively working on maintaining her head and trunk upright          -transitioned to sitting with her buttocks on a small wedge, with slightly improved stability noted   Quad/Crawling ---   Tall Kneel Activities ---   Transitional Activities -see DMI   Standing Activities -see DMI  -during gait activities   Universal Exercise Unit ---   Arlen Rowan ---   Gait Training -gait training with the [facility] gait  with ankle prompts x1 lap with Omid actively stepping throughout and PT providing A to maintain LE ext on her stance LE while she actively advanced her other LE     OTHER      Dynamic Movement fatigue.   Eyes: Negative for visual disturbance.   Respiratory: Negative for shortness of breath.    Cardiovascular: Negative for chest pain.   Gastrointestinal: Negative for constipation, nausea and vomiting.   Genitourinary: Negative for difficulty urinating.   Musculoskeletal: Positive for back pain, gait problem and neck pain.   Neurological: Negative for dizziness and headaches.   Psychiatric/Behavioral: Positive for sleep disturbance. Negative for behavioral problems.       Objective:      Physical Exam   Constitutional: She is oriented to person, place, and time. She appears well-developed and well-nourished.   Coming to the clinic in an electric scooter.   Neck:   Decreased ROM.  Mild tenderness.   Musculoskeletal:   BUE:  ROM: decreased at shoulder abduction, Rt worse than Lt.  +ve Heberden's & Nilsa's nodes.  Strength:    RUE: 3+/5 at shoulder abduction, 5- elbow flexion, elbow extension, hand .   LUE: 4/5 at shoulder abduction, 5- elbow flexion, elbow extension, hand .  Sensation to pinprick:   RUE: mild decrease.   LUE: intact.      BLE:  ROM:full.  +ve bilateral knee crepitus.   Strength:      RLE: 5/5 at hip flexion, knee extension, ankle DF/PF, EHL.     LLE:  5/5 at hip flexion, knee extension, ankle DF/PF, EHL.  Sensation to pinprick:     RLE: mild decrease.      LLE: mild decrease.   SLR (sitting):      RLE: +ve.      LLE: -ve.    +ve mild tenderness over lumbar spine.     Neurological: She is alert and oriented to person, place, and time.   Psychiatric: She has a normal mood and affect.   Vitals reviewed.        Assessment:       1. Chronic midline low back pain with right-sided sciatica    2. Osteoarthritis of spine with radiculopathy, lumbar region    3. Lumbar postlaminectomy syndrome    4. Chronic neck pain    5. Cervical post-laminectomy syndrome    6. Primary osteoarthritis of both knees    7. Primary osteoarthritis of both hips    8. Subdeltoid bursitis, right        Plan:        - Continue hydrocodone-acetaminophen (PERCOCET)  mg per tablet; Take 1 tablet by mouth 3 (three) times daily as needed for Pain.  - Start DULoxetine (CYMBALTA) 30 MG capsule; Take 1 capsule (30 mg total) by mouth once daily. In 1-2 weeks, if no relief, may increase dose to 2 capsules once daily. Call for refills.  - Continue diclofenac sodium 1 % Gel; Apply 2 g topically 3 (three) times daily.  - Continue tizanidine (ZANAFLEX) 4 MG tablet; Take 1 tablet (4 mg total) by mouth 3 (three) times daily as needed.  - The patient is still not interested in referral for ESIs.  - Regular home exercise program was encouraged.  - Follow-up in about 3 months (around 10/5/2018).      This was a 25 minute visit, more than 50% of which was spent counseling the patient about the diagnosis and the treatment plan.     Intervention (DMI)  Activity Repetitions Comments   [] Supine Pivot by 90 Degrees     [x] Neck Flexion x5 [] By Trunk Wrap  [x] By Arms-- coming up into sitting     [] 180 by Trunk:  Neck Side Flexion     [] Vibration Against Gravity  [] Prone  [] Supine  [] Sidelying   [] Horizontal 180 Degrees         Activity Repetitions Comments   [] High Kneeling  [] By Froilan Abdi  [] by Chest     [] Rolling Supine to Prone by Ankles       [x] Supine to Sit -by midtrunk x5/side [x] By Mid Trunk  [] By Low Pelvis  [] By One Arm  [] By Pelvis Facing Away  [] Legs Around Trunk, 90 Degrees  [] Legs Around Trunk 180 Degrees   [] Trunk Extension by Low Abdomen  [] Prone  [] Supine  [] Sidelying   [x] Sitting On Forearm with Intermittent Support   x4 -support at shoulder remained more consistently today, with Omid exhibiting periods of decreased participation   [] Prone to 4 Point to Sit by Pelvis       [] Prone to [place] (rocking) by Elbows x4 [] \"T\" Method  [x] \" Y\" Method-- then coming up into sitting x4   [x] Rhythmical Arm Placing in [place] x1 cycles per arm -decreased tolerance     Activity Repetitions Comments   [] 180 Degrees Standing         [] Supine to Stand   x5 [] By Occiput and Ankles  [] By Forearms and Ankles  [x] By Trunk Wrap           [] Standing Through Half Kneeling by Forearms and Ankle  [] Pronated Hold  [] Supinated Hold     [] Prone to [place] to Squat to Stand by [employer]     [] Standing   x5 [x] By [employer]  [] Below Knees  [] By Ankles  [] Combination   [] Standing 20 Counts Random       [x] Prone to Stand    x4 [x] By Abdomen and Ankles  [] By Ankle and Forearm   [x] Standing Against Trunk   x4 [] Onto Toes  [x] Lateral Weight Shifting  [x] Marching Pattern- x4/cycle       [] Standing on Thighs    [] Bouncing on Knees  [] LEs into Adduction/Abduction  [] Alternating Knee Bend   [] Standing Between Legs   x4 -support at low trunk and hips    [] Walking     [] By Thighs  [] By Below Knee  [] By Combination Thigh and Ankle  [] By Ankles       Patient's tolerance to therapy:  []  good  [x]  fair  [] increased fatigue  [x] other: periods of fussing, however, able to calm. ASSESSMENT/Changes in Function:   Omid participated in a 1.5 hour intensive physical therapy session today. She tolerated vestibular input provided on the swing and reflex integration activities well. Dasia Meier participated in a number of dynamic movement integration, DMI, activities today. Dasia Meier continues to participate well in transitional skills, and is engaging her obliques well to bring her trunk to upright/midline. She was more inconsistent during sitting activities today, however continues to work on maintaining upright/midline control well. Omid was able to push upright into a standing position from prone today, however would not stand for as long periods of time. Omid participated in gait training with the [facility] gait . She is able to advance LEs well when provided with A to maintain ext through her stance LE. Dasia Meier became fussy towards the end of gait training today, however generally participated well throughout. With the ankle prompts removed, she steps with a more high steppage gait, with improved control noted when ankle prompts were in place. Overall, Omid had a good session today. Cont POC. Patient will benefit from skilled PT services to modify and progress therapeutic interventions, address functional mobility deficits, address ROM deficits, address strength deficits, analyze and address soft tissue restrictions, analyze and cue movement patterns, analyze and modify body mechanics/ergonomics, assess and modify postural abnormalities and instruct in home and community integration to attain remaining goals.      [x]  See Plan of Care  []  See progress note/recertification  []  See Discharge Summary         Progress towards goals / Updated goals: [x]  Continues to work on goals on a daily basis    Long Term Goals:  (11/3/21-11/3/22)  Omid will demonstrate improved total body strength, head control, balance, sustained activity tolerance, motor control and coordination in order to demonstrate more age appropriate gross motor skills and maximize her independence and safety with all functional mobility within her home and community.  PROGRESSING     Short Term Goals:   Omid will:        Maintain her head upright while in prone prop with her elbows supported to maintain this position, for at least 10 seconds, as seen in 2/3 trials. Date assessed: 11/3/21  Partially Met: Talita Fagan can hold head up for 2-5s max while in prone prop. 10/26/20- 3/30/22   Maintain sitting balance with min A, without forward flexion or pushing backwards, for at least 15 seconds, as been in 2/3 trials, to improve sitting balance to engage with her environment. Progressing. With PT support at her midtrunk today    Assessed: 3/14/22 10/26/20- 4/30/22   Transition to sit through either side with min A and [place] A to maintain her hand down to push through, as seen in 2/3 trials, to improve ability to assist with transitional skills. Status: Progressing  Omid requires mostly min A with periodic mod A, working on consistency of skill. Assessed: 3/14/22 10/26/20- 3/30/22   Commando crawl forward along a mat surface with a surface provided to push her leg off of and Omid actively pulling herself forward with her UEs x5ft, as seen in 2/3 trials, in order to improve functional mobility throughout her environment. Status: Progressing  Omid requires AA for LE management and to provide a surface to push off of, and decreased UE pulling noted, with difficulty maintaining head upright for prolonged periods of time 10/26/20- 4/30/22   Take 5 consecutive steps forward with the most appropriate assistive device, actively advancing each LE and grounding her foot upon contact with the ground, as seen in 2/3 trials.  Progressing- able to step forward, and improved ability to ground foot and demonstrate quick burst of LE extension with assistance to sustain. 1/21/21-4/30/22   Maintain LE extension in supported standing for at least 1 minute, as seen in 3/5 trials. Partially met: Improved LE extension in standing with reduced supports to stabilize feet.   Assessed: 3/14/22 11/3/21-3/31/22        PLAN  [x]  Upgrade activities as tolerated     [x]  Continue plan of care  []  Update interventions per flow sheet       []  Discharge due to:_  []  Other:_        Jazmin Bowen, PT 2/25/22

## 2022-04-05 ENCOUNTER — HOSPITAL ENCOUNTER (INPATIENT)
Facility: HOSPITAL | Age: 77
LOS: 4 days | Discharge: HOME HEALTH | DRG: 252 | End: 2022-04-09
Attending: EMERGENCY MEDICINE | Admitting: FAMILY MEDICINE
Payer: MEDICARE

## 2022-04-05 DIAGNOSIS — R06.02 SOB (SHORTNESS OF BREATH): ICD-10-CM

## 2022-04-05 DIAGNOSIS — T82.868A CLOTTED RENAL DIALYSIS ARTERIOVENOUS GRAFT: ICD-10-CM

## 2022-04-05 DIAGNOSIS — J96.21 ACUTE ON CHRONIC RESPIRATORY FAILURE WITH HYPOXIA: ICD-10-CM

## 2022-04-05 DIAGNOSIS — I50.9 CHF (CONGESTIVE HEART FAILURE): ICD-10-CM

## 2022-04-05 DIAGNOSIS — J96.01 ACUTE RESPIRATORY FAILURE WITH HYPOXIA AND HYPERCAPNIA: ICD-10-CM

## 2022-04-05 DIAGNOSIS — N18.6 ESRD (END STAGE RENAL DISEASE): ICD-10-CM

## 2022-04-05 DIAGNOSIS — I50.9 ACUTE ON CHRONIC HEART FAILURE, UNSPECIFIED HEART FAILURE TYPE: ICD-10-CM

## 2022-04-05 DIAGNOSIS — T82.49XD CLOTTED DIALYSIS ACCESS, SUBSEQUENT ENCOUNTER: Primary | ICD-10-CM

## 2022-04-05 DIAGNOSIS — T82.590S DIALYSIS AV FISTULA MALFUNCTION, SEQUELA: ICD-10-CM

## 2022-04-05 DIAGNOSIS — R09.02 HYPOXIA: ICD-10-CM

## 2022-04-05 DIAGNOSIS — J90 PLEURAL EFFUSION: ICD-10-CM

## 2022-04-05 DIAGNOSIS — R07.9 CHEST PAIN: ICD-10-CM

## 2022-04-05 DIAGNOSIS — J44.1 COPD EXACERBATION: ICD-10-CM

## 2022-04-05 DIAGNOSIS — J96.02 ACUTE RESPIRATORY FAILURE WITH HYPOXIA AND HYPERCAPNIA: ICD-10-CM

## 2022-04-05 DIAGNOSIS — T82.49XA CLOTTED DIALYSIS ACCESS, INITIAL ENCOUNTER: ICD-10-CM

## 2022-04-05 LAB
ALBUMIN SERPL BCP-MCNC: 3.6 G/DL (ref 3.5–5.2)
ALLENS TEST: ABNORMAL
ALP SERPL-CCNC: 374 U/L (ref 55–135)
ALT SERPL W/O P-5'-P-CCNC: 8 U/L (ref 10–44)
ANION GAP SERPL CALC-SCNC: 13 MMOL/L (ref 8–16)
AST SERPL-CCNC: 16 U/L (ref 10–40)
BASOPHILS # BLD AUTO: 0.13 K/UL (ref 0–0.2)
BASOPHILS NFR BLD: 1.2 % (ref 0–1.9)
BILIRUB SERPL-MCNC: 0.8 MG/DL (ref 0.1–1)
BNP SERPL-MCNC: 440 PG/ML (ref 0–99)
BUN SERPL-MCNC: 28 MG/DL (ref 8–23)
CALCIUM SERPL-MCNC: 9.6 MG/DL (ref 8.7–10.5)
CHLORIDE SERPL-SCNC: 99 MMOL/L (ref 95–110)
CO2 SERPL-SCNC: 26 MMOL/L (ref 23–29)
CREAT SERPL-MCNC: 2.4 MG/DL (ref 0.5–1.4)
CTP QC/QA: YES
DELSYS: ABNORMAL
DIFFERENTIAL METHOD: ABNORMAL
EOSINOPHIL # BLD AUTO: 0.7 K/UL (ref 0–0.5)
EOSINOPHIL NFR BLD: 6.3 % (ref 0–8)
EP: 8
ERYTHROCYTE [DISTWIDTH] IN BLOOD BY AUTOMATED COUNT: 13.5 % (ref 11.5–14.5)
EST. GFR  (AFRICAN AMERICAN): 22 ML/MIN/1.73 M^2
EST. GFR  (NON AFRICAN AMERICAN): 19 ML/MIN/1.73 M^2
FIO2: 30
GLUCOSE SERPL-MCNC: 97 MG/DL (ref 70–110)
HCO3 UR-SCNC: 25.8 MMOL/L (ref 24–28)
HCT VFR BLD AUTO: 40.1 % (ref 37–48.5)
HGB BLD-MCNC: 12.3 G/DL (ref 12–16)
IMM GRANULOCYTES # BLD AUTO: 0.04 K/UL (ref 0–0.04)
IMM GRANULOCYTES NFR BLD AUTO: 0.4 % (ref 0–0.5)
IP: 15
LYMPHOCYTES # BLD AUTO: 1.1 K/UL (ref 1–4.8)
LYMPHOCYTES NFR BLD: 9.7 % (ref 18–48)
MAGNESIUM SERPL-MCNC: 2 MG/DL (ref 1.6–2.6)
MCH RBC QN AUTO: 30.7 PG (ref 27–31)
MCHC RBC AUTO-ENTMCNC: 30.7 G/DL (ref 32–36)
MCV RBC AUTO: 100 FL (ref 82–98)
MODE: ABNORMAL
MONOCYTES # BLD AUTO: 1.1 K/UL (ref 0.3–1)
MONOCYTES NFR BLD: 9.8 % (ref 4–15)
NEUTROPHILS # BLD AUTO: 8.2 K/UL (ref 1.8–7.7)
NEUTROPHILS NFR BLD: 72.6 % (ref 38–73)
NRBC BLD-RTO: 0 /100 WBC
PCO2 BLDA: 37.9 MMHG (ref 35–45)
PH SMN: 7.44 [PH] (ref 7.35–7.45)
PLATELET # BLD AUTO: 218 K/UL (ref 150–450)
PMV BLD AUTO: 9.6 FL (ref 9.2–12.9)
PO2 BLDA: 43 MMHG (ref 80–100)
POC BE: 2 MMOL/L
POC SATURATED O2: 81 % (ref 95–100)
POC TCO2: 27 MMOL/L (ref 23–27)
POCT GLUCOSE: 211 MG/DL (ref 70–110)
POTASSIUM SERPL-SCNC: 3.9 MMOL/L (ref 3.5–5.1)
PROT SERPL-MCNC: 8.4 G/DL (ref 6–8.4)
RBC # BLD AUTO: 4.01 M/UL (ref 4–5.4)
SAMPLE: ABNORMAL
SARS-COV-2 RDRP RESP QL NAA+PROBE: NEGATIVE
SITE: ABNORMAL
SODIUM SERPL-SCNC: 138 MMOL/L (ref 136–145)
TROPONIN I SERPL DL<=0.01 NG/ML-MCNC: 0.01 NG/ML (ref 0–0.03)
WBC # BLD AUTO: 11.24 K/UL (ref 3.9–12.7)

## 2022-04-05 PROCEDURE — 93005 ELECTROCARDIOGRAM TRACING: CPT

## 2022-04-05 PROCEDURE — 36600 WITHDRAWAL OF ARTERIAL BLOOD: CPT

## 2022-04-05 PROCEDURE — 93010 ELECTROCARDIOGRAM REPORT: CPT | Mod: ,,, | Performed by: INTERNAL MEDICINE

## 2022-04-05 PROCEDURE — 99291 CRITICAL CARE FIRST HOUR: CPT | Mod: 25

## 2022-04-05 PROCEDURE — 94640 AIRWAY INHALATION TREATMENT: CPT

## 2022-04-05 PROCEDURE — 83880 ASSAY OF NATRIURETIC PEPTIDE: CPT | Performed by: STUDENT IN AN ORGANIZED HEALTH CARE EDUCATION/TRAINING PROGRAM

## 2022-04-05 PROCEDURE — 84484 ASSAY OF TROPONIN QUANT: CPT | Performed by: STUDENT IN AN ORGANIZED HEALTH CARE EDUCATION/TRAINING PROGRAM

## 2022-04-05 PROCEDURE — 93010 EKG 12-LEAD: ICD-10-PCS | Mod: ,,, | Performed by: INTERNAL MEDICINE

## 2022-04-05 PROCEDURE — 83735 ASSAY OF MAGNESIUM: CPT | Performed by: STUDENT IN AN ORGANIZED HEALTH CARE EDUCATION/TRAINING PROGRAM

## 2022-04-05 PROCEDURE — 25000242 PHARM REV CODE 250 ALT 637 W/ HCPCS: Performed by: STUDENT IN AN ORGANIZED HEALTH CARE EDUCATION/TRAINING PROGRAM

## 2022-04-05 PROCEDURE — 90935 HEMODIALYSIS ONE EVALUATION: CPT

## 2022-04-05 PROCEDURE — 25000242 PHARM REV CODE 250 ALT 637 W/ HCPCS

## 2022-04-05 PROCEDURE — 27000190 HC CPAP FULL FACE MASK W/VALVE

## 2022-04-05 PROCEDURE — 94761 N-INVAS EAR/PLS OXIMETRY MLT: CPT

## 2022-04-05 PROCEDURE — 99900035 HC TECH TIME PER 15 MIN (STAT)

## 2022-04-05 PROCEDURE — 11000001 HC ACUTE MED/SURG PRIVATE ROOM

## 2022-04-05 PROCEDURE — 85025 COMPLETE CBC W/AUTO DIFF WBC: CPT | Performed by: STUDENT IN AN ORGANIZED HEALTH CARE EDUCATION/TRAINING PROGRAM

## 2022-04-05 PROCEDURE — 82803 BLOOD GASES ANY COMBINATION: CPT

## 2022-04-05 PROCEDURE — 27000221 HC OXYGEN, UP TO 24 HOURS

## 2022-04-05 PROCEDURE — U0002 COVID-19 LAB TEST NON-CDC: HCPCS | Performed by: STUDENT IN AN ORGANIZED HEALTH CARE EDUCATION/TRAINING PROGRAM

## 2022-04-05 PROCEDURE — 94660 CPAP INITIATION&MGMT: CPT

## 2022-04-05 PROCEDURE — 80053 COMPREHEN METABOLIC PANEL: CPT | Performed by: STUDENT IN AN ORGANIZED HEALTH CARE EDUCATION/TRAINING PROGRAM

## 2022-04-05 RX ORDER — IBUPROFEN 200 MG
24 TABLET ORAL
Status: DISCONTINUED | OUTPATIENT
Start: 2022-04-05 | End: 2022-04-10 | Stop reason: HOSPADM

## 2022-04-05 RX ORDER — ACETAMINOPHEN 325 MG/1
650 TABLET ORAL EVERY 4 HOURS PRN
Status: DISCONTINUED | OUTPATIENT
Start: 2022-04-05 | End: 2022-04-10 | Stop reason: HOSPADM

## 2022-04-05 RX ORDER — METHYLPREDNISOLONE SOD SUCC 125 MG
125 VIAL (EA) INJECTION
Status: DISCONTINUED | OUTPATIENT
Start: 2022-04-06 | End: 2022-04-06

## 2022-04-05 RX ORDER — ONDANSETRON 2 MG/ML
4 INJECTION INTRAMUSCULAR; INTRAVENOUS EVERY 8 HOURS PRN
Status: DISCONTINUED | OUTPATIENT
Start: 2022-04-05 | End: 2022-04-10 | Stop reason: HOSPADM

## 2022-04-05 RX ORDER — METHYLPREDNISOLONE SOD SUCC 125 MG
125 VIAL (EA) INJECTION
Status: DISCONTINUED | OUTPATIENT
Start: 2022-04-05 | End: 2022-04-05

## 2022-04-05 RX ORDER — SODIUM CHLORIDE 0.9 % (FLUSH) 0.9 %
3 SYRINGE (ML) INJECTION
Status: DISCONTINUED | OUTPATIENT
Start: 2022-04-05 | End: 2022-04-10 | Stop reason: HOSPADM

## 2022-04-05 RX ORDER — SIMETHICONE 80 MG
1 TABLET,CHEWABLE ORAL 4 TIMES DAILY PRN
Status: DISCONTINUED | OUTPATIENT
Start: 2022-04-05 | End: 2022-04-10 | Stop reason: HOSPADM

## 2022-04-05 RX ORDER — IBUPROFEN 200 MG
16 TABLET ORAL
Status: DISCONTINUED | OUTPATIENT
Start: 2022-04-05 | End: 2022-04-10 | Stop reason: HOSPADM

## 2022-04-05 RX ORDER — HEPARIN SODIUM 5000 [USP'U]/ML
5000 INJECTION, SOLUTION INTRAVENOUS; SUBCUTANEOUS EVERY 8 HOURS
Status: DISCONTINUED | OUTPATIENT
Start: 2022-04-05 | End: 2022-04-10 | Stop reason: HOSPADM

## 2022-04-05 RX ORDER — FLUTICASONE FUROATE AND VILANTEROL 200; 25 UG/1; UG/1
1 POWDER RESPIRATORY (INHALATION) DAILY
Status: DISCONTINUED | OUTPATIENT
Start: 2022-04-06 | End: 2022-04-10 | Stop reason: HOSPADM

## 2022-04-05 RX ORDER — IPRATROPIUM BROMIDE AND ALBUTEROL SULFATE 2.5; .5 MG/3ML; MG/3ML
3 SOLUTION RESPIRATORY (INHALATION)
Status: DISCONTINUED | OUTPATIENT
Start: 2022-04-05 | End: 2022-04-10 | Stop reason: HOSPADM

## 2022-04-05 RX ORDER — TALC
6 POWDER (GRAM) TOPICAL NIGHTLY PRN
Status: DISCONTINUED | OUTPATIENT
Start: 2022-04-05 | End: 2022-04-10 | Stop reason: HOSPADM

## 2022-04-05 RX ORDER — MUPIROCIN 20 MG/G
OINTMENT TOPICAL 2 TIMES DAILY
Status: DISCONTINUED | OUTPATIENT
Start: 2022-04-05 | End: 2022-04-10 | Stop reason: HOSPADM

## 2022-04-05 RX ORDER — IPRATROPIUM BROMIDE AND ALBUTEROL SULFATE 2.5; .5 MG/3ML; MG/3ML
3 SOLUTION RESPIRATORY (INHALATION)
Status: COMPLETED | OUTPATIENT
Start: 2022-04-05 | End: 2022-04-05

## 2022-04-05 RX ORDER — PREDNISONE 20 MG/1
40 TABLET ORAL DAILY
Status: DISCONTINUED | OUTPATIENT
Start: 2022-04-05 | End: 2022-04-05

## 2022-04-05 RX ORDER — MAG HYDROX/ALUMINUM HYD/SIMETH 200-200-20
30 SUSPENSION, ORAL (FINAL DOSE FORM) ORAL 4 TIMES DAILY PRN
Status: DISCONTINUED | OUTPATIENT
Start: 2022-04-05 | End: 2022-04-10 | Stop reason: HOSPADM

## 2022-04-05 RX ORDER — SODIUM CHLORIDE 9 MG/ML
INJECTION, SOLUTION INTRAVENOUS ONCE
Status: COMPLETED | OUTPATIENT
Start: 2022-04-05 | End: 2022-04-08

## 2022-04-05 RX ORDER — IPRATROPIUM BROMIDE AND ALBUTEROL SULFATE 2.5; .5 MG/3ML; MG/3ML
3 SOLUTION RESPIRATORY (INHALATION) EVERY 6 HOURS PRN
Status: DISCONTINUED | OUTPATIENT
Start: 2022-04-05 | End: 2022-04-10 | Stop reason: HOSPADM

## 2022-04-05 RX ORDER — SODIUM CHLORIDE 9 MG/ML
INJECTION, SOLUTION INTRAVENOUS
Status: DISCONTINUED | OUTPATIENT
Start: 2022-04-05 | End: 2022-04-10 | Stop reason: HOSPADM

## 2022-04-05 RX ORDER — GLUCAGON 1 MG
1 KIT INJECTION
Status: DISCONTINUED | OUTPATIENT
Start: 2022-04-05 | End: 2022-04-10 | Stop reason: HOSPADM

## 2022-04-05 RX ADMIN — IPRATROPIUM BROMIDE AND ALBUTEROL SULFATE 3 ML: .5; 3 SOLUTION RESPIRATORY (INHALATION) at 03:04

## 2022-04-05 RX ADMIN — IPRATROPIUM BROMIDE AND ALBUTEROL SULFATE 3 ML: .5; 3 SOLUTION RESPIRATORY (INHALATION) at 07:04

## 2022-04-05 RX ADMIN — IPRATROPIUM BROMIDE AND ALBUTEROL SULFATE 3 ML: .5; 2.5 SOLUTION RESPIRATORY (INHALATION) at 01:04

## 2022-04-05 RX ADMIN — IPRATROPIUM BROMIDE AND ALBUTEROL SULFATE 3 ML: .5; 3 SOLUTION RESPIRATORY (INHALATION) at 01:04

## 2022-04-05 NOTE — ED NOTES
Patient unable to be taken off the BiPAP - Respiratory therapist with go to dialysis to set patient up - bed admit changed to ICU

## 2022-04-05 NOTE — PLAN OF CARE
Patient on Bipap with documented settings .  RR in the 40s.  ABG drawn and reported to MD.  Will continue to monitor.

## 2022-04-05 NOTE — Clinical Note
Is this patient a high probability for COVID-19?: No   Diagnosis: Acute respiratory failure with hypoxia and hypercapnia [2597386]   Admitting Provider:: GINI ABDUL [[id number]]   Future Attending Provider: GINI ABDUL [[id number]]   Reason for IP Medical Treatment  (Clinical interventions that can only be accomplished in the IP setting? ) :: acute respiratory failure, COPD exacerbation   Estimated Length of Stay:: 2 midnights   I certify that Inpatient services for greater than or equal to 2 midnights are medically necessary:: Yes   Plans for Post-Acute care--if anticipated (pick the single best option):: A. No post acute care anticipated at this time

## 2022-04-05 NOTE — ED NOTES
Respiratory therapy aware that patient needs ABG and attempt to wean off of BiPAP for admission and dialysis

## 2022-04-05 NOTE — ED NOTES
Patient arrives EMS from dialysis for evaluation of 2 days of increased SOB and cough - patient was not started on her dialysis today - patient arrives with Ambu bag over face to supply with 100% oxygen, though bag not being compressed - respiratory at bedside to set patient up on BiPAP - patient was found to have sats of 80% on room air and tripod stance on arrival of EMS

## 2022-04-05 NOTE — ED PROVIDER NOTES
Encounter Date: 4/5/2022       History     Chief Complaint   Patient presents with    Shortness of Breath     Pt at dialysis , hasnt dialyzed since last week, sob for couple days, sats in 70's when ejems arrived . Pt 98% on arrival to er, denies chest pain. No dialysis received.     68-year-old female with PMH of COPD, ESRD on HD, hypertension, AVF clot, and CHF presents with shortness of breath.  Shortness of breath is new since last night, acute onset, progressive, without relieving factors, and associated with hypoxia to the 70s.  Patient went to dialysis morning and was accessed but did not receive any dialysis.  Per EMS, the patient's O2 saturation was in the 70s when they arrived.  They gave the patient Solu-Medrol but did not administer any breathing treatments. Denies increased sputum production but reports fluid on her lungs. Patient denies chest pain, headache, dizziness, lightheadedness, abdominal pain, nausea, vomiting, dysuria, hematuria, diarrhea, constipation, black/bloody stools.  She reports daily use of her inhalers.  She reports multiple ED visits for breathing issues.  History of tobacco use.    The history is provided by the patient and the EMS personnel. The history is limited by the condition of the patient.     Review of patient's allergies indicates:   Allergen Reactions    Aspirin      Other reaction(s): hx of ulcers    Tetracycline Swelling     Other reaction(s): Swelling    Penicillins Rash     Other reaction(s): Hives  Other reaction(s): Rash  Other reaction(s): Rash  Other reaction(s): Hives     Past Medical History:   Diagnosis Date    Acute congestive heart failure 02/10/2020    Anemia     Bilateral renal cysts     Cataract     Chronic LBP 7/26/2012    Chronic pain     CKD (chronic kidney disease), stage IV     Colon polyp 2013    COPD (chronic obstructive pulmonary disease)     Dehydration     Encounter for blood transfusion     HTN (hypertension)     Lumbar  spondylosis     Melanoma     of the lip    Metabolic bone disease     Migraines, neuralgic     Osteoporosis     Primary osteoarthritis of both knees     s/p Rt TKA    Pulmonary embolism with infarction     Seizures 1972    x1 only    Subdeltoid bursitis, L>R. 9/27/2012    Ulcer     Vitamin D deficiency disease      Past Surgical History:   Procedure Laterality Date    BLADDER SUSPENSION      CATARACT EXTRACTION  11/18/13    left eye    CERVICAL LAMINECTOMY      x3, fusion x1    COLONOSCOPY  2009    DECLOTTING OF ARTERIOVENOUS GRAFT Left 1/3/2022    Procedure: [id number];  Surgeon: Lindsey Louie MD;  Location: [facility] OR;  Service: Vascular;  Laterality: Left;    DECLOTTING OF ARTERIOVENOUS GRAFT Left 2/8/2022    Procedure: [id number];  Surgeon: Lindsey Louie MD;  Location: [facility] OR;  Service: Vascular;  Laterality: Left;    FISTULOGRAM N/A 2/27/2020    Procedure: Fistulogram;  Surgeon: Noe Benitez MD;  Location: [facility] CATH LAB/EP;  Service: Cardiology;  Laterality: N/A;    HYSTERECTOMY      JOINT REPLACEMENT  2001    total right knee     LUMBAR LAMINECTOMY      x 3, fusion x1    OOPHORECTOMY      PERIPHERAL ANGIOGRAPHY N/A 3/9/2020    Procedure: Peripheral angiography;  Surgeon: Jacinto Henriquez MD;  Location: [facility] CATH LAB/EP;  Service: Cardiology;  Laterality: N/A;    PLACEMENT OF ARTERIOVENOUS GRAFT Left 1/21/2020    Procedure: INSERTION, GRAFT, ARTERIOVENOUS;  Surgeon: Lindsey Louie MD;  Location: [facility] OR;  Service: General;  Laterality: Left;    THROMBECTOMY Left 2/3/2020    Procedure: THROMBECTOMY;  Surgeon: Lindsey Louie MD;  Location: [facility] OR;  Service: General;  Laterality: Left;    THROMBECTOMY  3/9/2020    Procedure: Thrombectomy;  Surgeon: Jacinto Henriquez MD;  Location: [facility] CATH LAB/EP;  Service: Cardiology;;     Family History   Problem Relation Age of Onset    Arthritis Mother     Stroke Mother     Hypertension Father      Cancer Father     Cataracts Father     Diabetes Maternal Aunt     Hypertension Maternal Grandfather     Heart disease Maternal Grandfather     Heart attack Maternal Grandfather     Cataracts Sister     Glaucoma Cousin      Social History     Tobacco Use    Smoking status: Former Smoker     Types: Cigarettes    Smokeless tobacco: Former User     Quit date: 2/3/2015   Substance Use Topics    Alcohol use: Not Currently     Comment: Rare    Drug use: No     Review of Systems   Constitutional: Negative for chills and fever.   HENT: Negative for congestion and sinus pain.    Eyes: Negative for pain and visual disturbance.   Respiratory: Positive for cough and shortness of breath.    Cardiovascular: Negative for chest pain and leg swelling.   Gastrointestinal: Negative for abdominal pain, constipation, diarrhea, nausea and vomiting.   Endocrine: Negative for polydipsia and polyuria.   Genitourinary: Negative for dysuria and hematuria.   Musculoskeletal: Negative for arthralgias and back pain.   Skin: Negative for color change and rash.   Neurological: Negative for dizziness, weakness, light-headedness and headaches.       Physical Exam     Initial Vitals   BP Pulse Resp Temp SpO2   04/05/22 1336 04/05/22 1311 04/05/22 1311 04/05/22 1336 04/05/22 1311   127/65 99 (!) 32 97.3 °F (36.3 °C) 98 %      MAP       --                Physical Exam    Nursing note and vitals reviewed.  Constitutional: She appears well-developed and well-nourished. She is not diaphoretic.   Increased work of breathing with accessory muscle use and tachypnea but not acutely distressed   HENT:   Head: Normocephalic and atraumatic.   Eyes: Conjunctivae and EOM are normal. Pupils are equal, round, and reactive to light.   Neck: Neck supple. No JVD present.   Normal range of motion.  Cardiovascular: Regular rhythm, normal heart sounds and intact distal pulses.   No murmur heard.  Tachycardia   Pulmonary/Chest:   Increased WOB and  tachypnea.Decreased air movement bilaterally but no wheezes or crackles appreciated   Abdominal: Abdomen is soft. She exhibits no distension. There is no abdominal tenderness. There is no rebound and no guarding.   Musculoskeletal:         General: No tenderness or edema.      Cervical back: Normal range of motion and neck supple.      Comments: No peripheral edema to b/l LE     Neurological: She is alert and oriented to person, place, and time. GCS score is 15. GCS eye subscore is 4. GCS verbal subscore is 5. GCS motor subscore is 6.   Skin: Skin is warm and dry. Capillary refill takes less than 2 seconds.         ED Course   Procedures  Labs Reviewed   CBC W/ AUTO DIFFERENTIAL - Abnormal; Notable for the following components:       Result Value     (*)     MCHC 30.7 (*)     Gran # (ANC) 8.2 (*)     Mono # 1.1 (*)     Eos # 0.7 (*)     Lymph % 9.7 (*)     All other components within normal limits   COMPREHENSIVE METABOLIC PANEL - Abnormal; Notable for the following components:    BUN 28 (*)     Creatinine 2.4 (*)     Alkaline Phosphatase 374 (*)     ALT 8 (*)     eGFR if  22 (*)     eGFR if non  19 (*)     All other components within normal limits   B-TYPE NATRIURETIC PEPTIDE - Abnormal; Notable for the following components:     (*)     All other components within normal limits   TROPONIN I   MAGNESIUM   SARS-COV-2 RDRP GENE    Narrative:     This test utilizes isothermal nucleic acid amplification   technology to detect the SARS-CoV-2 RdRp nucleic acid segment.   The analytical sensitivity (limit of detection) is 125 genome   equivalents/mL.   A POSITIVE result implies infection with the SARS-CoV-2 virus;   the patient is presumed to be contagious.     A NEGATIVE result means that SARS-CoV-2 nucleic acids are not   present above the limit of detection. A NEGATIVE result should be   treated as presumptive. It does not rule out the possibility of   COVID-19 and should  not be the sole basis for treatment decisions.   If COVID-19 is strongly suspected based on clinical and exposure   history, re-testing using an alternate molecular assay should be   considered.   This test is only for use under the Food and Drug   Administration s Emergency Use Authorization (EUA).   Commercial kits are provided by [employer].   Performance characteristics of the EUA have been independently   verified by [facility] Department of   Pathology and Laboratory Medicine.   _________________________________________________________________   The authorized Fact Sheet for Healthcare Providers and the authorized Fact   Sheet for Patients of the ID NOW COVID-19 are available on the FDA   website:     https://www.fda.gov/media/463773/download  https://www.fda.gov/media/052927/download            EKG Readings: (Independently Interpreted)   Normal sinus rhythm at a rate of 96, no STEMI, normal T-wave morphology, overall unremarkable EKG, though slightly limited interpretation secondary to artifact.        Imaging Results          X-Ray Chest AP Portable (Final result)  Result time 04/05/22 14:48:45    Final result by Joe Soto Jr., MD (04/05/22 14:48:45)                 Impression:      Bibasilar pleural effusions, left greater than right and left basilar compressive atelectasis and or pneumonia    Background of pulmonary hyperinflation and interstitial prominence could indicate an element of interstitial edema      Electronically signed by: Joe Garcia Jr  Date:    04/05/2022  Time:    14:48             Narrative:    EXAMINATION:  XR CHEST AP PORTABLE    CLINICAL HISTORY:  hypoxia;    TECHNIQUE:  Single frontal view of the chest was performed.    COMPARISON:  02/08/2022    FINDINGS:  Cardiomediastinal silhouettewithin normal limits for age.    Lungs are hyperinflated.  Blunting of the costophrenic sulci bilaterally in and left basilar opacity suggesting pleural fluid and  adjacent compressive atelectasis appears similar to prior.    Prominent interstitial markings could indicate superimposed edema.                                 Medications   mupirocin 2 % ointment (has no administration in time range)   0.9%  NaCl infusion (has no administration in time range)   0.9%  NaCl infusion (has no administration in time range)   sodium chloride 0.9% bolus 250 mL (has no administration in time range)   albuterol-ipratropium 2.5 mg-0.5 mg/3 mL nebulizer solution 3 mL (3 mLs Nebulization Given 4/5/22 1321)   albuterol-ipratropium 2.5 mg-0.5 mg/3 mL nebulizer solution 3 mL (3 mLs Nebulization Given 4/5/22 1353)     Medical Decision Making:   Initial Assessment:   68-year-old female with PMH of COPD, ESRD on HD, hypertension, AVF clot, and CHF presents with 1 day history of progressive shortness of breath and cough associated with O2 in the 70s, hemodynamically stable, decreased air movement bilaterally. Will start patient on BiPAP and give Duonebs.   Differential Diagnosis:   COPD exacerbation, CHF exacerbation, pneumonia, clot in AVF, electrolyte abnormality  Clinical Tests:   Lab Tests: Ordered and Reviewed  Radiological Study: Ordered and Reviewed  Medical Tests: Ordered and Reviewed  ED Management:  See ED course             ED Course as of 04/05/22 1506   Tue Apr 05, 2022   1346 CBC auto differential(!)  CBC unremarkable without leukocytosis, significant anemia, or decreased platelets   [BD]   1411 Troponin I: 0.011  ACS is unlikely [BD]   1411 Magnesium: 2.0 [BD]   1411 Comprehensive metabolic panel(!)  CMP shows baseline impaired renal function without electrolyte derangement [BD]   1411 BNP(!): 440  Possibly consistent with CHF exacerbation, though patient has clear lungs and I have lower suspicion for this at this time [BD]   1438 Patient is doing much better on BiPAP.  I will contact Hospital Medicine for admission.  Nephrology 0 to been contacted and will schedule her dialysis for  today.  Patient agrees with and is comfortable with the plan. [BD]   1505 [facility] medicine will admit the patient to their service.  She remains hemodynamically stable on BiPAP.  Dialysis scheduled for this afternoon.  Patient and  agree with and are comfortable with the plan. [BD]      ED Course User Index  [BD] Milan Silver MD             Clinical Impression:   Final diagnoses:  [R06.02] SOB (shortness of breath)  [J44.1] COPD exacerbation (Primary)  [N18.6] ESRD (end stage renal disease)  [R09.02] Hypoxia          ED Disposition Condition    Admit               Milan Silver MD  Resident  04/05/22 1506       Milan Silver MD  Resident  04/05/22 1504

## 2022-04-05 NOTE — CONSULTS
Nephrology Consult  H&P      Consult Requested By: Miranda Gill MD  Reason for Consult: ESRD     SUBJECTIVE:     History of Present Illness:  Katie Quevedo is a 76 y.o.   female who  has a past medical history of  ESRD on HD via [facility] with Dr Aura Diggs, Acute congestive heart failure (02/10/2020), Anemia, Bilateral renal cysts, Cataract, Chronic LBP (7/26/2012), Chronic pain, CKD (chronic kidney disease), stage IV, Colon polyp (2013), COPD (chronic obstructive pulmonary disease), Dehydration, Encounter for blood transfusion, HTN (hypertension), Lumbar spondylosis, Melanoma, Metabolic bone disease, Migraines, neuralgic, Osteoporosis, Primary osteoarthritis of both knees, Pulmonary embolism with infarction, Seizures (1972), Subdeltoid bursitis, L>R. (9/27/2012), Ulcer, and Vitamin D deficiency disease.. The patient presented to the [facility] on 4/5/2022 with a primary complaint of SOB missed HD Monday   ?    Review of Systems   Constitutional: Negative for chills and fever.   HENT: Negative for congestion and sore throat.    Eyes: Negative for blurred vision, double vision and photophobia.   Respiratory: Negative for cough and shortness of breath.    Cardiovascular: Negative for chest pain, palpitations and leg swelling.   Gastrointestinal: Negative for abdominal pain, diarrhea, nausea and vomiting.   Genitourinary: Negative for dysuria and urgency.   Musculoskeletal: Negative for joint pain and myalgias.   Skin: Negative for itching and rash.   Neurological: Negative for dizziness, sensory change, weakness and headaches.   Endo/Heme/Allergies: Negative for polydipsia. Does not bruise/bleed easily.   Psychiatric/Behavioral: Negative for depression.       Past Medical History:   Diagnosis Date    Acute congestive heart failure 02/10/2020    Anemia     Bilateral renal cysts     Cataract     Chronic LBP 7/26/2012    Chronic pain     CKD (chronic kidney disease), stage IV     Colon polyp 2013     COPD (chronic obstructive pulmonary disease)     Dehydration     Encounter for blood transfusion     HTN (hypertension)     Lumbar spondylosis     Melanoma     of the lip    Metabolic bone disease     Migraines, neuralgic     Osteoporosis     Primary osteoarthritis of both knees     s/p Rt TKA    Pulmonary embolism with infarction     Seizures 1972    x1 only    Subdeltoid bursitis, L>R. 9/27/2012    Ulcer     Vitamin D deficiency disease      Past Surgical History:   Procedure Laterality Date    BLADDER SUSPENSION      CATARACT EXTRACTION  11/18/13    left eye    CERVICAL LAMINECTOMY      x3, fusion x1    COLONOSCOPY  2009    DECLOTTING OF ARTERIOVENOUS GRAFT Left 1/3/2022    Procedure: [id number];  Surgeon: Lindsey Louie MD;  Location: [facility] OR;  Service: Vascular;  Laterality: Left;    DECLOTTING OF ARTERIOVENOUS GRAFT Left 2/8/2022    Procedure: [id number];  Surgeon: Lindsey Louie MD;  Location: [facility] OR;  Service: Vascular;  Laterality: Left;    FISTULOGRAM N/A 2/27/2020    Procedure: Fistulogram;  Surgeon: Noe Benitez MD;  Location: [facility] CATH LAB/EP;  Service: Cardiology;  Laterality: N/A;    HYSTERECTOMY      JOINT REPLACEMENT  2001    total right knee     LUMBAR LAMINECTOMY      x 3, fusion x1    OOPHORECTOMY      PERIPHERAL ANGIOGRAPHY N/A 3/9/2020    Procedure: Peripheral angiography;  Surgeon: Jacinto Henriquez MD;  Location: [facility] CATH LAB/EP;  Service: Cardiology;  Laterality: N/A;    PLACEMENT OF ARTERIOVENOUS GRAFT Left 1/21/2020    Procedure: INSERTION, GRAFT, ARTERIOVENOUS;  Surgeon: Lindsey Louie MD;  Location: [facility] OR;  Service: General;  Laterality: Left;    THROMBECTOMY Left 2/3/2020    Procedure: THROMBECTOMY;  Surgeon: Lindsey Louie MD;  Location: [facility] OR;  Service: General;  Laterality: Left;    THROMBECTOMY  3/9/2020    Procedure: Thrombectomy;  Surgeon: Jacinto Henriquez MD;  Location: [facility] CATH LAB/EP;   "Service: Cardiology;;     Family History   Problem Relation Age of Onset    Arthritis Mother     Stroke Mother     Hypertension Father     Cancer Father     Cataracts Father     Diabetes Maternal Aunt     Hypertension Maternal Grandfather     Heart disease Maternal Grandfather     Heart attack Maternal Grandfather     Cataracts Sister     Glaucoma Cousin      Social History     Tobacco Use    Smoking status: Former Smoker     Types: Cigarettes    Smokeless tobacco: Former User     Quit date: 2/3/2015   Substance Use Topics    Alcohol use: Not Currently     Comment: Rare    Drug use: No       Review of patient's allergies indicates:   Allergen Reactions    Aspirin      Other reaction(s): hx of ulcers    Tetracycline Swelling     Other reaction(s): Swelling    Penicillins Rash     Other reaction(s): Hives  Other reaction(s): Rash  Other reaction(s): Rash  Other reaction(s): Hives            OBJECTIVE:     Vital Signs (Most Recent)  Vitals:    04/05/22 1311 04/05/22 1321   Pulse: 99 94   Resp: (!) 32 18   SpO2: 98% 99%   Weight: 40.8 kg (90 lb)    Height: 5' 2" (1.575 m)                  Medications:          Physical Exam  Vitals and nursing note reviewed.   Constitutional:       General: She is not in acute distress.     Appearance: She is not diaphoretic.   HENT:      Head: Normocephalic and atraumatic.      Mouth/Throat:      Pharynx: No oropharyngeal exudate.   Eyes:      General: No scleral icterus.     Conjunctiva/sclera: Conjunctivae normal.      Pupils: Pupils are equal, round, and reactive to light.   Cardiovascular:      Rate and Rhythm: Normal rate and regular rhythm.      Heart sounds: Normal heart sounds. No murmur heard.  Pulmonary:      Effort: Pulmonary effort is normal. No respiratory distress.      Breath sounds: Wheezing present.   Abdominal:      General: Bowel sounds are normal. There is no distension.      Palpations: Abdomen is soft.      Tenderness: There is no abdominal " tenderness.   Musculoskeletal:         General: Normal range of motion.      Cervical back: Normal range of motion and neck supple.      Comments: AVF no thrill    Skin:     General: Skin is warm and dry.      Findings: No erythema.   Neurological:      Mental Status: She is alert and oriented to person, place, and time.      Cranial Nerves: No cranial nerve deficit.   Psychiatric:         Mood and Affect: Affect normal.         Cognition and Memory: Memory normal.         Judgment: Judgment normal.         Laboratory:  No results for input(s): WBC, HGB, HCT, PLT, NEUTOPHILPCT, MONO in the last 168 hours.    Invalid input(s):  EOSINOPHIL  No results for input(s): NA, K, CL, CO2, BUN, CREATININE, GLUCOSE, CALCIUM, PHOS in the last 168 hours.    Invalid input(s): EGFR, LABALBU    Diagnostic Results:  X-Ray: Reviewed  US: Reviewed  Echo: Reviewed  ASSESSMENT/PLAN:     1. ESRD  [facility]   -- Missed Monday now SOB hypoxia   Get labs and HD with [facility] today a and most likely tomorrow   -- Keep MWF   -- Daily Renal Function Panel  -- Avoid Hypotension.  -- Renally dose all meds  2. HTN (I10) -  Controlled   3. Anemia of chronic kidney disease treated with BILL (N18.9 D63.1) -   EPogen  with   HD as needed    No results for input(s): HGB, HCT, PLT in the last 168 hours.    Iron   Lab Results   Component Value Date    IRON 13 (L) 02/12/2020    TIBC 462 (H) 02/12/2020    FERRITIN 148 07/17/2019       4. MBD (E88.9 M90.80) -  No results for input(s): PTH, CALCIUM, CAION, PHOS in the last 168 hours.  No results for input(s): MG in the last 168 hours.    Lab Results   Component Value Date    .0 (H) 12/28/2018    CALCIUM 10.2 02/08/2022    PHOS 5.6 (H) 02/08/2022     Lab Results   Component Value Date    [id number] 26 (L) 02/12/2020     Sevelamer   Lab Results   Component Value Date    CO2 22 (L) 02/08/2022       5. Nutrition/Hypoalbuminemia (E88.09) -   No results for input(s): LABPROT, ALBUMIN  in the last 168 hours.  Nepro with meals TID. Renal vitamins daily      Thank you for the consult, will follow  With any question please call [phone]  Ramo Da Silva MD    [facility]  EDGARD Bustillos MD, NEIL SAMS MD,   MD CURTIS Benoit MD E. V. Harmon, NP    [address]  [place], [address]  [phone]

## 2022-04-06 LAB
ALBUMIN SERPL BCP-MCNC: 3.5 G/DL (ref 3.5–5.2)
ALP SERPL-CCNC: 323 U/L (ref 55–135)
ALT SERPL W/O P-5'-P-CCNC: 7 U/L (ref 10–44)
ANION GAP SERPL CALC-SCNC: 10 MMOL/L (ref 8–16)
ANION GAP SERPL CALC-SCNC: 10 MMOL/L (ref 8–16)
AORTIC ROOT ANNULUS: 2.82 CM
AST SERPL-CCNC: 17 U/L (ref 10–40)
AV INDEX (PROSTH): 0.82
AV MEAN GRADIENT: 3 MMHG
AV PEAK GRADIENT: 4 MMHG
AV VALVE AREA: 2.38 CM2
AV VELOCITY RATIO: 0.94
BASOPHILS # BLD AUTO: 0 K/UL (ref 0–0.2)
BASOPHILS NFR BLD: 0 % (ref 0–1.9)
BILIRUB SERPL-MCNC: 0.8 MG/DL (ref 0.1–1)
BSA FOR ECHO PROCEDURE: 1.3 M2
BUN SERPL-MCNC: 22 MG/DL (ref 8–23)
BUN SERPL-MCNC: 22 MG/DL (ref 8–23)
CALCIUM SERPL-MCNC: 9.4 MG/DL (ref 8.7–10.5)
CALCIUM SERPL-MCNC: 9.4 MG/DL (ref 8.7–10.5)
CHLORIDE SERPL-SCNC: 101 MMOL/L (ref 95–110)
CHLORIDE SERPL-SCNC: 101 MMOL/L (ref 95–110)
CO2 SERPL-SCNC: 22 MMOL/L (ref 23–29)
CO2 SERPL-SCNC: 22 MMOL/L (ref 23–29)
CREAT SERPL-MCNC: 1.7 MG/DL (ref 0.5–1.4)
CREAT SERPL-MCNC: 1.7 MG/DL (ref 0.5–1.4)
CV ECHO LV RWT: 0.46 CM
DIFFERENTIAL METHOD: ABNORMAL
DOP CALC AO PEAK VEL: 0.98 M/S
DOP CALC AO VTI: 22.7 CM
DOP CALC LVOT AREA: 2.9 CM2
DOP CALC LVOT DIAMETER: 1.92 CM
DOP CALC LVOT PEAK VEL: 0.92 M/S
DOP CALC LVOT STROKE VOLUME: 53.94 CM3
DOP CALC MV VTI: 20.45 CM
DOP CALCLVOT PEAK VEL VTI: 18.64 CM
E WAVE DECELERATION TIME: 167.62 MSEC
E/A RATIO: 1.26
E/E' RATIO: 11.6 M/S
ECHO LV POSTERIOR WALL: 0.82 CM (ref 0.6–1.1)
EJECTION FRACTION: 55 %
EOSINOPHIL # BLD AUTO: 0 K/UL (ref 0–0.5)
EOSINOPHIL NFR BLD: 0 % (ref 0–8)
ERYTHROCYTE [DISTWIDTH] IN BLOOD BY AUTOMATED COUNT: 13.5 % (ref 11.5–14.5)
EST. GFR  (AFRICAN AMERICAN): 33 ML/MIN/1.73 M^2
EST. GFR  (AFRICAN AMERICAN): 33 ML/MIN/1.73 M^2
EST. GFR  (NON AFRICAN AMERICAN): 28 ML/MIN/1.73 M^2
EST. GFR  (NON AFRICAN AMERICAN): 28 ML/MIN/1.73 M^2
FRACTIONAL SHORTENING: 39 % (ref 28–44)
GLUCOSE SERPL-MCNC: 149 MG/DL (ref 70–110)
GLUCOSE SERPL-MCNC: 149 MG/DL (ref 70–110)
HCT VFR BLD AUTO: 38.1 % (ref 37–48.5)
HGB BLD-MCNC: 11.8 G/DL (ref 12–16)
IMM GRANULOCYTES # BLD AUTO: 0.05 K/UL (ref 0–0.04)
IMM GRANULOCYTES NFR BLD AUTO: 0.6 % (ref 0–0.5)
INTERVENTRICULAR SEPTUM: 0.83 CM (ref 0.6–1.1)
LA MAJOR: 4.36 CM
LA MINOR: 4.07 CM
LA WIDTH: 3.9 CM
LEFT ATRIUM SIZE: 3.82 CM
LEFT ATRIUM VOLUME INDEX MOD: 29.4 ML/M2
LEFT ATRIUM VOLUME INDEX: 40.1 ML/M2
LEFT ATRIUM VOLUME MOD: 39.05 CM3
LEFT ATRIUM VOLUME: 53.31 CM3
LEFT INTERNAL DIMENSION IN SYSTOLE: 2.17 CM (ref 2.1–4)
LEFT VENTRICLE DIASTOLIC VOLUME INDEX: 39.47 ML/M2
LEFT VENTRICLE DIASTOLIC VOLUME: 52.49 ML
LEFT VENTRICLE MASS INDEX: 60 G/M2
LEFT VENTRICLE SYSTOLIC VOLUME INDEX: 11.8 ML/M2
LEFT VENTRICLE SYSTOLIC VOLUME: 15.7 ML
LEFT VENTRICULAR INTERNAL DIMENSION IN DIASTOLE: 3.55 CM (ref 3.5–6)
LEFT VENTRICULAR MASS: 80.36 G
LV LATERAL E/E' RATIO: 12.43 M/S
LV SEPTAL E/E' RATIO: 10.88 M/S
LYMPHOCYTES # BLD AUTO: 0.5 K/UL (ref 1–4.8)
LYMPHOCYTES NFR BLD: 5.9 % (ref 18–48)
MAGNESIUM SERPL-MCNC: 2.1 MG/DL (ref 1.6–2.6)
MAGNESIUM SERPL-MCNC: 2.1 MG/DL (ref 1.6–2.6)
MCH RBC QN AUTO: 30.3 PG (ref 27–31)
MCHC RBC AUTO-ENTMCNC: 31 G/DL (ref 32–36)
MCV RBC AUTO: 98 FL (ref 82–98)
MONOCYTES # BLD AUTO: 0.1 K/UL (ref 0.3–1)
MONOCYTES NFR BLD: 0.8 % (ref 4–15)
MV MEAN GRADIENT: 0 MMHG
MV PEAK A VEL: 0.69 M/S
MV PEAK E VEL: 0.87 M/S
MV PEAK GRADIENT: 3 MMHG
MV STENOSIS PRESSURE HALF TIME: 48.61 MS
MV VALVE AREA BY CONTINUITY EQUATION: 2.64 CM2
MV VALVE AREA P 1/2 METHOD: 4.53 CM2
NEUTROPHILS # BLD AUTO: 7.9 K/UL (ref 1.8–7.7)
NEUTROPHILS NFR BLD: 92.7 % (ref 38–73)
NRBC BLD-RTO: 0 /100 WBC
PHOSPHATE SERPL-MCNC: 3.1 MG/DL (ref 2.7–4.5)
PISA TR MAX VEL: 1.59 M/S
PLATELET # BLD AUTO: 171 K/UL (ref 150–450)
PMV BLD AUTO: 10.2 FL (ref 9.2–12.9)
POCT GLUCOSE: 138 MG/DL (ref 70–110)
POCT GLUCOSE: 146 MG/DL (ref 70–110)
POCT GLUCOSE: 159 MG/DL (ref 70–110)
POTASSIUM SERPL-SCNC: 4.8 MMOL/L (ref 3.5–5.1)
POTASSIUM SERPL-SCNC: 4.8 MMOL/L (ref 3.5–5.1)
PROT SERPL-MCNC: 8 G/DL (ref 6–8.4)
PV PEAK VELOCITY: 0.77 CM/S
RA MAJOR: 3.73 CM
RA PRESSURE: 15 MMHG
RA WIDTH: 2.93 CM
RBC # BLD AUTO: 3.9 M/UL (ref 4–5.4)
RIGHT VENTRICULAR END-DIASTOLIC DIMENSION: 2.11 CM
RV TISSUE DOPPLER FREE WALL SYSTOLIC VELOCITY 1 (APICAL 4 CHAMBER VIEW): 12.31 CM/S
SODIUM SERPL-SCNC: 133 MMOL/L (ref 136–145)
SODIUM SERPL-SCNC: 133 MMOL/L (ref 136–145)
TDI LATERAL: 0.07 M/S
TDI SEPTAL: 0.08 M/S
TDI: 0.08 M/S
TR MAX PG: 10 MMHG
TRICUSPID ANNULAR PLANE SYSTOLIC EXCURSION: 1.66 CM
TV REST PULMONARY ARTERY PRESSURE: 25 MMHG
WBC # BLD AUTO: 8.48 K/UL (ref 3.9–12.7)

## 2022-04-06 PROCEDURE — 25000003 PHARM REV CODE 250: Performed by: STUDENT IN AN ORGANIZED HEALTH CARE EDUCATION/TRAINING PROGRAM

## 2022-04-06 PROCEDURE — 86706 HEP B SURFACE ANTIBODY: CPT | Performed by: STUDENT IN AN ORGANIZED HEALTH CARE EDUCATION/TRAINING PROGRAM

## 2022-04-06 PROCEDURE — 25000003 PHARM REV CODE 250

## 2022-04-06 PROCEDURE — 27201247 HC HEMODIALYSIS, SET-UP & CANCEL

## 2022-04-06 PROCEDURE — 63600175 PHARM REV CODE 636 W HCPCS: Performed by: FAMILY MEDICINE

## 2022-04-06 PROCEDURE — 86704 HEP B CORE ANTIBODY TOTAL: CPT | Performed by: STUDENT IN AN ORGANIZED HEALTH CARE EDUCATION/TRAINING PROGRAM

## 2022-04-06 PROCEDURE — 97165 OT EVAL LOW COMPLEX 30 MIN: CPT

## 2022-04-06 PROCEDURE — 25000003 PHARM REV CODE 250: Performed by: FAMILY MEDICINE

## 2022-04-06 PROCEDURE — 27000221 HC OXYGEN, UP TO 24 HOURS

## 2022-04-06 PROCEDURE — 94761 N-INVAS EAR/PLS OXIMETRY MLT: CPT

## 2022-04-06 PROCEDURE — 87340 HEPATITIS B SURFACE AG IA: CPT | Performed by: STUDENT IN AN ORGANIZED HEALTH CARE EDUCATION/TRAINING PROGRAM

## 2022-04-06 PROCEDURE — 11000001 HC ACUTE MED/SURG PRIVATE ROOM

## 2022-04-06 PROCEDURE — 84100 ASSAY OF PHOSPHORUS: CPT

## 2022-04-06 PROCEDURE — 99900035 HC TECH TIME PER 15 MIN (STAT)

## 2022-04-06 PROCEDURE — 80053 COMPREHEN METABOLIC PANEL: CPT

## 2022-04-06 PROCEDURE — 97161 PT EVAL LOW COMPLEX 20 MIN: CPT

## 2022-04-06 PROCEDURE — 63600175 PHARM REV CODE 636 W HCPCS

## 2022-04-06 PROCEDURE — 63600175 PHARM REV CODE 636 W HCPCS: Performed by: INTERNAL MEDICINE

## 2022-04-06 PROCEDURE — 85025 COMPLETE CBC W/AUTO DIFF WBC: CPT

## 2022-04-06 PROCEDURE — 25000242 PHARM REV CODE 250 ALT 637 W/ HCPCS

## 2022-04-06 PROCEDURE — 36415 COLL VENOUS BLD VENIPUNCTURE: CPT

## 2022-04-06 PROCEDURE — 94640 AIRWAY INHALATION TREATMENT: CPT

## 2022-04-06 PROCEDURE — 83735 ASSAY OF MAGNESIUM: CPT

## 2022-04-06 RX ORDER — PREDNISONE 20 MG/1
40 TABLET ORAL DAILY
Status: DISCONTINUED | OUTPATIENT
Start: 2022-04-06 | End: 2022-04-10 | Stop reason: HOSPADM

## 2022-04-06 RX ORDER — SEVELAMER CARBONATE 800 MG/1
800 TABLET, FILM COATED ORAL
Status: DISCONTINUED | OUTPATIENT
Start: 2022-04-06 | End: 2022-04-10 | Stop reason: HOSPADM

## 2022-04-06 RX ORDER — HYDROCODONE BITARTRATE AND ACETAMINOPHEN 10; 325 MG/1; MG/1
1 TABLET ORAL 3 TIMES DAILY PRN
Status: COMPLETED | OUTPATIENT
Start: 2022-04-06 | End: 2022-04-07

## 2022-04-06 RX ORDER — MIRTAZAPINE 7.5 MG/1
15 TABLET, FILM COATED ORAL NIGHTLY
Status: DISCONTINUED | OUTPATIENT
Start: 2022-04-06 | End: 2022-04-10 | Stop reason: HOSPADM

## 2022-04-06 RX ADMIN — MUPIROCIN: 20 OINTMENT TOPICAL at 09:04

## 2022-04-06 RX ADMIN — MUPIROCIN: 20 OINTMENT TOPICAL at 10:04

## 2022-04-06 RX ADMIN — HYDROCODONE BITARTRATE AND ACETAMINOPHEN 1 TABLET: 10; 325 TABLET ORAL at 09:04

## 2022-04-06 RX ADMIN — SEVELAMER CARBONATE 800 MG: 800 TABLET, FILM COATED ORAL at 05:04

## 2022-04-06 RX ADMIN — IPRATROPIUM BROMIDE AND ALBUTEROL SULFATE 3 ML: .5; 3 SOLUTION RESPIRATORY (INHALATION) at 07:04

## 2022-04-06 RX ADMIN — IPRATROPIUM BROMIDE AND ALBUTEROL SULFATE 3 ML: .5; 3 SOLUTION RESPIRATORY (INHALATION) at 12:04

## 2022-04-06 RX ADMIN — MELATONIN TAB 3 MG 6 MG: 3 TAB at 12:04

## 2022-04-06 RX ADMIN — MUPIROCIN: 20 OINTMENT TOPICAL at 12:04

## 2022-04-06 RX ADMIN — HEPARIN SODIUM 5000 UNITS: 5000 INJECTION INTRAVENOUS; SUBCUTANEOUS at 12:04

## 2022-04-06 RX ADMIN — HEPARIN SODIUM 5000 UNITS: 5000 INJECTION INTRAVENOUS; SUBCUTANEOUS at 06:04

## 2022-04-06 RX ADMIN — SODIUM CHLORIDE 250 ML: 0.9 INJECTION, SOLUTION INTRAVENOUS at 05:04

## 2022-04-06 RX ADMIN — PREDNISONE 40 MG: 20 TABLET ORAL at 12:04

## 2022-04-06 RX ADMIN — SEVELAMER CARBONATE 800 MG: 800 TABLET, FILM COATED ORAL at 09:04

## 2022-04-06 RX ADMIN — HYDROCODONE BITARTRATE AND ACETAMINOPHEN 1 TABLET: 10; 325 TABLET ORAL at 06:04

## 2022-04-06 RX ADMIN — IPRATROPIUM BROMIDE AND ALBUTEROL SULFATE 3 ML: .5; 3 SOLUTION RESPIRATORY (INHALATION) at 09:04

## 2022-04-06 RX ADMIN — ONDANSETRON 4 MG: 2 INJECTION INTRAMUSCULAR; INTRAVENOUS at 12:04

## 2022-04-06 RX ADMIN — HEPARIN SODIUM 5000 UNITS: 5000 INJECTION INTRAVENOUS; SUBCUTANEOUS at 05:04

## 2022-04-06 RX ADMIN — SEVELAMER CARBONATE 800 MG: 800 TABLET, FILM COATED ORAL at 12:04

## 2022-04-06 RX ADMIN — METHYLPREDNISOLONE SODIUM SUCCINATE 125 MG: 125 INJECTION, POWDER, FOR SOLUTION INTRAMUSCULAR; INTRAVENOUS at 12:04

## 2022-04-06 RX ADMIN — HEPARIN SODIUM 5000 UNITS: 5000 INJECTION INTRAVENOUS; SUBCUTANEOUS at 10:04

## 2022-04-06 RX ADMIN — IPRATROPIUM BROMIDE AND ALBUTEROL SULFATE 3 ML: .5; 3 SOLUTION RESPIRATORY (INHALATION) at 03:04

## 2022-04-06 RX ADMIN — MELATONIN TAB 3 MG 6 MG: 3 TAB at 10:04

## 2022-04-06 RX ADMIN — MIRTAZAPINE 15 MG: 7.5 TABLET ORAL at 10:04

## 2022-04-06 NOTE — SUBJECTIVE & OBJECTIVE
Past Medical History:   Diagnosis Date    Acute congestive heart failure 02/10/2020    Anemia     Bilateral renal cysts     Cataract     Chronic LBP 7/26/2012    Chronic pain     CKD (chronic kidney disease), stage IV     Colon polyp 2013    COPD (chronic obstructive pulmonary disease)     Dehydration     Encounter for blood transfusion     HTN (hypertension)     Lumbar spondylosis     Melanoma     of the lip    Metabolic bone disease     Migraines, neuralgic     Osteoporosis     Primary osteoarthritis of both knees     s/p Rt TKA    Pulmonary embolism with infarction     Seizures 1972    x1 only    Subdeltoid bursitis, L>R. 9/27/2012    Ulcer     Vitamin D deficiency disease        Past Surgical History:   Procedure Laterality Date    BLADDER SUSPENSION      CATARACT EXTRACTION  11/18/13    left eye    CERVICAL LAMINECTOMY      x3, fusion x1    COLONOSCOPY  2009    DECLOTTING OF ARTERIOVENOUS GRAFT Left 1/3/2022    Procedure: [id number]-[id number]-LP;  Surgeon: Lindsey Louie MD;  Location: [facility] OR;  Service: Vascular;  Laterality: Left;    DECLOTTING OF ARTERIOVENOUS GRAFT Left 2/8/2022    Procedure: [id number];  Surgeon: Lindsey Louie MD;  Location: [facility] OR;  Service: Vascular;  Laterality: Left;    FISTULOGRAM N/A 2/27/2020    Procedure: Fistulogram;  Surgeon: Noe Benitez MD;  Location: [facility] CATH LAB/EP;  Service: Cardiology;  Laterality: N/A;    HYSTERECTOMY      JOINT REPLACEMENT  2001    total right knee     LUMBAR LAMINECTOMY      x 3, fusion x1    OOPHORECTOMY      PERIPHERAL ANGIOGRAPHY N/A 3/9/2020    Procedure: Peripheral angiography;  Surgeon: Jacinto Henriquez MD;  Location: [facility] CATH LAB/EP;  Service: Cardiology;  Laterality: N/A;    PLACEMENT OF ARTERIOVENOUS GRAFT Left 1/21/2020    Procedure: INSERTION, GRAFT, ARTERIOVENOUS;  Surgeon: Lindsey Louie MD;  Location: [facility] OR;  Service: General;  Laterality: Left;    THROMBECTOMY Left 2/3/2020    Procedure: THROMBECTOMY;   Surgeon: Lindsey Louei MD;  Location: [facility] OR;  Service: General;  Laterality: Left;    THROMBECTOMY  3/9/2020    Procedure: Thrombectomy;  Surgeon: Jacinto Henriquez MD;  Location: [facility] CATH LAB/EP;  Service: Cardiology;;       Review of patient's allergies indicates:   Allergen Reactions    Aspirin      Other reaction(s): hx of ulcers    Tetracycline Swelling     Other reaction(s): Swelling    Penicillins Rash     Other reaction(s): Hives  Other reaction(s): Rash  Other reaction(s): Rash  Other reaction(s): Hives       No current facility-administered medications on file prior to encounter.     Current Outpatient Medications on File Prior to Encounter   Medication Sig    acetaminophen (TYLENOL) 325 MG tablet Take 1 tablet (325 mg total) by mouth every 6 (six) hours as needed for Pain.    albuterol (VENTOLIN HFA) 90 mcg/actuation inhaler Inhale 2 puffs into the lungs every 6 (six) hours as needed for Wheezing or Shortness of Breath. Rescue    albuterol-ipratropium (DUO-NEB) 2.5 mg-0.5 mg/3 mL nebulizer solution Take 3 mLs by nebulization every 6 (six) hours as needed for Shortness of Breath. Rescue    B complex-vitamin C-folic acid (LINDA-RADHA) 0.8 mg Tab Take 1 tablet (0.8 mg total) by mouth once daily.    clonazePAM (KLONOPIN) 1 MG tablet TAKE 1 TABLET BY MOUTH 3 TIMES WEEKLY ON DIALYSIS DAYS    diclofenac sodium (VOLTAREN) 1 % Gel Apply 2 g topically 3 (three) times daily as needed (apply).    fluticasone-umeclidin-vilanter (TRELEGY ELLIPTA) 200-62.5-25 mcg inhaler Inhale 1 puff into the lungs once daily.    HYDROcodone-acetaminophen (NORCO)  mg per tablet Take 1 tablet by mouth 3 (three) times daily as needed for Pain.    mirtazapine (REMERON) 15 MG tablet Take 1 tablet (15 mg total) by mouth every evening.    sevelamer carbonate (RENVELA) 800 mg Tab Take 1 tablet (800 mg total) by mouth after meals as needed.    allopurinoL (ZYLOPRIM) 100 MG tablet Take 1 tablet (100 mg total) by mouth on Tuesday,  Thursday, Saturday, and Sunday.    busPIRone (BUSPAR) 10 MG tablet Take 10 mg by mouth once daily.    diphenhydrAMINE (BENADRYL) 25 mg capsule Take 25 mg by mouth every 6 (six) hours as needed for Itching.    epoetin hemant-epbx (RETACRIT) 10,000 unit/mL imjection Inject 0.5 mLs (5,000 Units total) into the skin every Mon, Wed, Fri.    levoFLOXacin (LEVAQUIN) 500 MG tablet Take every other day after returning home from dialysis.    lidocaine-prilocaine (EMLA) cream Apply topically to left arm prior to dialysis.    zolpidem (AMBIEN) 5 MG Tab Take 5 mg by mouth nightly.     Family History       Problem Relation (Age of Onset)    Arthritis Mother    Cancer Father    Cataracts Father, Sister    Diabetes Maternal Aunt    Glaucoma Cousin    Heart attack Maternal Grandfather    Heart disease Maternal Grandfather    Hypertension Father, Maternal Grandfather    Stroke Mother          Tobacco Use    Smoking status: Former Smoker     Types: Cigarettes    Smokeless tobacco: Former User     Quit date: 2/3/2015   Substance and Sexual Activity    Alcohol use: Not Currently     Comment: Rare    Drug use: No    Sexual activity: Never     Partners: Male     Review of Systems   Constitutional:  Negative for chills, diaphoresis and fever.   HENT:  Negative for hearing loss and sore throat.    Eyes:  Negative for visual disturbance.   Respiratory:  Positive for cough and shortness of breath.    Cardiovascular:  Negative for chest pain and leg swelling.   Gastrointestinal:  Negative for abdominal pain, diarrhea, nausea and vomiting.   Genitourinary:  Negative for difficulty urinating and flank pain.   Musculoskeletal:  Negative for back pain.   Skin:  Negative for rash and wound.   Neurological:  Negative for dizziness, weakness and headaches.   Psychiatric/Behavioral:  Negative for agitation and confusion.    Objective:     Vital Signs (Most Recent):  Temp: 97.6 °F (36.4 °C) (04/05/22 2336)  Pulse: 85 (04/06/22 0023)  Resp: 20 (04/05/22  2336)  BP: (!) 111/56 (04/05/22 2336)  SpO2: 96 % (04/06/22 0023)   Vital Signs (24h Range):  Temp:  [97.3 °F (36.3 °C)-97.8 °F (36.6 °C)] 97.6 °F (36.4 °C)  Pulse:  [] 85  Resp:  [18-43] 20  SpO2:  [94 %-100 %] 96 %  BP: ()/(53-65) 111/56     Weight: 40.8 kg (90 lb)  Body mass index is 16.46 kg/m².    Physical Exam  Vitals and nursing note reviewed.   Constitutional:       General: She is not in acute distress.     Appearance: Normal appearance. She is not ill-appearing.      Interventions: Nasal cannula in place.      Comments: 2L   HENT:      Head: Normocephalic and atraumatic.      Mouth/Throat:      Mouth: Mucous membranes are moist.   Eyes:      Extraocular Movements: Extraocular movements intact.      Pupils: Pupils are equal, round, and reactive to light.   Cardiovascular:      Rate and Rhythm: Normal rate and regular rhythm.      Pulses: Normal pulses.      Heart sounds: Normal heart sounds. No murmur heard.    No friction rub. No gallop.      Arteriovenous access: Left arteriovenous access is present.  Pulmonary:      Effort: Pulmonary effort is normal. No respiratory distress.      Breath sounds: Examination of the right-lower field reveals decreased breath sounds. Examination of the left-lower field reveals decreased breath sounds. Decreased breath sounds present. No wheezing or rhonchi.   Abdominal:      General: Bowel sounds are normal. There is no distension.      Palpations: Abdomen is soft.      Tenderness: There is no abdominal tenderness. There is no guarding.   Musculoskeletal:         General: No swelling.      Cervical back: Normal range of motion and neck supple.      Right lower leg: No edema.      Left lower leg: No edema.   Skin:     General: Skin is warm and dry.      Capillary Refill: Capillary refill takes less than 2 seconds.      Findings: No bruising, erythema or rash.   Neurological:      General: No focal deficit present.      Mental Status: She is alert and oriented to  person, place, and time.      GCS: GCS eye subscore is 4. GCS verbal subscore is 5. GCS motor subscore is 6.   Psychiatric:         Mood and Affect: Mood normal.         Behavior: Behavior normal. Behavior is cooperative.         CRANIAL NERVES     CN III, IV, VI   Pupils are equal, round, and reactive to light.     Significant Labs: All pertinent labs within the past 24 hours have been reviewed.  Recent Lab Results         04/05/22  2331   04/05/22  1542   04/05/22  1454   04/05/22  1323        Albumin       3.6       Alkaline Phosphatase       374       Allens Test   Pass           ALT       8       Anion Gap       13       AST       16       Baso #       0.13       Basophil %       1.2       BILIRUBIN TOTAL       0.8  Comment: For infants and newborns, interpretation of results should be based  on gestational age, weight and in agreement with clinical  observations.    Premature Infant recommended reference ranges:  Up to 24 hours.............<8.0 mg/dL  Up to 48 hours............<12.0 mg/dL  3-5 days..................<15.0 mg/dL  6-29 days.................<15.0 mg/dL         BNP       440  Comment: Values of less than 100 pg/ml are consistent with non-CHF populations.       Site   RR           BUN       28       Calcium       9.6       Chloride       99       CO2       26       Creatinine       2.4       DelSys   CPAP/BiPAP           Differential Method       Automated       eGFR if        22       eGFR if non        19  Comment: Calculation used to obtain the estimated glomerular filtration  rate (eGFR) is the CKD-EPI equation.          Eos #       0.7       Eosinophil %       6.3       EP   8           FiO2   30           Glucose       97       Gran # (ANC)       8.2       Gran %       72.6       Hematocrit       40.1       Hemoglobin       12.3       Immature Grans (Abs)       0.04  Comment: Mild elevation in immature granulocytes is non specific and   can be seen in a variety  of conditions including stress response,   acute inflammation, trauma and pregnancy. Correlation with other   laboratory and clinical findings is essential.         Immature Granulocytes       0.4       IP   15           Lymph #       1.1       Lymph %       9.7       Magnesium       2.0       MCH       30.7       MCHC       30.7       MCV       100       Mode   BiPAP           Mono #       1.1       Mono %       9.8       MPV       9.6       nRBC       0       Platelets       218       POC BE   2           POC HCO3   25.8           POC PCO2   37.9           POC PH   7.441           POC PO2   43           POC SATURATED O2   81           POC TCO2   27           POCT Glucose 211             Potassium       3.9       PROTEIN TOTAL       8.4        Acceptable     Yes         RBC       4.01       RDW       13.5       Sample   ARTERIAL           SARS-CoV-2 RNA, Amplification, Qual     Negative         Sodium       138       Troponin I       0.011  Comment: The reference interval for Troponin I represents the 99th percentile   cutoff   for our facility and is consistent with 3rd generation assay   performance.         WBC       11.24               Significant Imaging: I have reviewed all pertinent imaging results/findings within the past 24 hours.

## 2022-04-06 NOTE — ASSESSMENT & PLAN NOTE
-Patient with Hypoxic respiratory failure which is Acute on Chronic   -she is on home oxygen at 2 LPM.   -Supplemental oxygen was provided and noted- Nasal Cannula 1 LPM  -Signs/symptoms of respiratory failure include- tachypnea, increased work of breathing, respiratory distress and use of accessory muscles.   -Contributing diagnoses includes - COPD and Pleural effusion  -Labs and images were reviewed. Patient Has recent ABG, which has been reviewed.  -Continue supplemental oxygen; titrate and wean to maintain SpO2 >88%  -Duo-nebs PRN for SOB/Wheezing  -ABG PRN

## 2022-04-06 NOTE — ED NOTES
Pt back in ED from dialysis. +A/O x 4 w/ ABCs intact, NAD. VSS. Pt denies any complaints. States she missed dialysis on Monday past. Pt on O2 via NC @ 2 LPM.     Review of patient's allergies indicates:   Allergen Reactions    Aspirin      Other reaction(s): hx of ulcers    Tetracycline Swelling     Other reaction(s): Swelling    Penicillins Rash     Other reaction(s): Hives  Other reaction(s): Rash  Other reaction(s): Rash  Other reaction(s): Hives        Patient has verified the spelling of their name and  on armband.   APPEARANCE: Patient is alert, calm, oriented x 4, and does not appear distressed.  SKIN: Skin is normal for race, warm, and dry. Normal skin turgor and mucous membranes moist.  CARDIAC: Normal rate and rhythm, no murmur heard.   RESPIRATORY:Normal rate and effort. Breath sounds clear bilaterally throughout chest. Respirations are equal and unlabored.    GASTRO: Bowel sounds normal, abdomen is soft, no tenderness, and no abdominal distention.  MUSCLE: Full ROM. No bony tenderness or soft tissue tenderness. No obvious deformity.  PERIPHERAL VASCULAR: peripheral pulses present. Normal cap refill. No edema. Warm to touch. Fistual LUE  NEURO: 5/5 strength major flexors/extensors bilaterally. Sensory intact to light touch bilaterally. Shant coma scale: eyes open spontaneously-4, oriented & converses-5, obeys commands-6. No neurological abnormalities.   MENTAL STATUS: awake, alert and aware of environment.  EYE: No overt deficits noted. No drainage. Sclera WNL  ENT: EARS: no obvious drainage. NOSE: no active bleeding. THROAT: no redness or swelling.

## 2022-04-06 NOTE — PLAN OF CARE
SW met with pt and pt's  Dre [phone] at bedside to complete assessment. Pt has home oxygen stays on 2L and rollator at home. Pt gets [facility] at noon shift. Pt reports that  Dre [phone] will help transport pt home at time of d/c. White board updated with CM name and contact information.  SW will request f/u apps. Pt encouraged to call with any questions or concerns.  Cm will continue to follow pt through transitions of care and assist with any discharge needs.      Bam Méndez, MSSRAVANTHI  [phone]    Future Appointments   Date Time Provider Department Center   4/19/2022 10:00 AM Pushpa Mcmahon MD [facility]        04/06/22 0937   Discharge Assessment   Assessment Type Discharge Planning Assessment   Confirmed/corrected address, phone number and insurance Yes   Confirmed Demographics Correct on Facesheet   Source of Information patient;family   When was your last doctors appointment?   (pt is not sure)   Communicated LAITH with patient/caregiver Yes   Reason For Admission Acute on chronic respiratory failure with hypoxia   Lives With significant other   Facility Arrived From: home   Do you expect to return to your current living situation? Yes   Do you have help at home or someone to help you manage your care at home? Yes   Who are your caregiver(s) and their phone number(s)?  Dre [phone]   Prior to hospitilization cognitive status: Alert/Oriented   Current cognitive status: Alert/Oriented   Walking or Climbing Stairs Difficulty ambulation difficulty, requires equipment   Dressing/Bathing Difficulty bathing difficulty, requires equipment   Home Accessibility wheelchair accessible   Home Layout Able to live on 1st floor   Equipment Currently Used at Home rollator;oxygen;power chair   Readmission within 30 days? No   Patient currently being followed by outpatient case management? No   Do you currently have service(s) that help you manage your care at  home? No   Do you take prescription medications? Yes   Do you have prescription coverage? Yes   Coverage [employer]   Do you have any problems affording any of your prescribed medications? No   Is the patient taking medications as prescribed? yes   Who is going to help you get home at discharge?  Dre [phone]   How do you get to doctors appointments? family or friend will provide   Are you on dialysis? Yes   Dialysis Name and Scheduled days [facility]   Do you take coumadin? No   Discharge Plan A Home with family   DME Needed Upon Discharge  other (see comments)   Discharge Plan discussed with: Patient;Spouse/sig other   Name(s) and Number(s)  Dre [phone]   Discharge Barriers Identified None   Relationship/Environment   Name(s) of Who Lives With Patient  Dre [phone]

## 2022-04-06 NOTE — PROGRESS NOTES
Nephrology Progress Note       Consult Requested By: Jennifer Bowles*  Reason for Consult: ESRD     SUBJECTIVE:          Review of Systems   Constitutional: Negative for chills and fever.   HENT: Negative for congestion and sore throat.    Eyes: Negative for blurred vision, double vision and photophobia.   Respiratory: Negative for cough and shortness of breath.    Cardiovascular: Negative for chest pain, palpitations and leg swelling.   Gastrointestinal: Negative for abdominal pain, diarrhea, nausea and vomiting.   Genitourinary: Negative for dysuria and urgency.   Musculoskeletal: Negative for joint pain and myalgias.   Skin: Negative for itching and rash.   Neurological: Negative for dizziness, sensory change, weakness and headaches.   Endo/Heme/Allergies: Negative for polydipsia. Does not bruise/bleed easily.   Psychiatric/Behavioral: Negative for depression.       Past Medical History:   Diagnosis Date    Acute congestive heart failure 02/10/2020    Anemia     Bilateral renal cysts     Cataract     Chronic LBP 7/26/2012    Chronic pain     CKD (chronic kidney disease), stage IV     Colon polyp 2013    COPD (chronic obstructive pulmonary disease)     Dehydration     Encounter for blood transfusion     HTN (hypertension)     Lumbar spondylosis     Melanoma     of the lip    Metabolic bone disease     Migraines, neuralgic     Osteoporosis     Primary osteoarthritis of both knees     s/p Rt TKA    Pulmonary embolism with infarction     Seizures 1972    x1 only    Subdeltoid bursitis, L>R. 9/27/2012    Ulcer     Vitamin D deficiency disease      Past Surgical History:   Procedure Laterality Date    BLADDER SUSPENSION      CATARACT EXTRACTION  11/18/13    left eye    CERVICAL LAMINECTOMY      x3, fusion x1    COLONOSCOPY  2009    DECLOTTING OF ARTERIOVENOUS GRAFT Left 1/3/2022    Procedure: [id number];  Surgeon: Lindsey Louie MD;  Location: [facility] OR;  Service:  Vascular;  Laterality: Left;    DECLOTTING OF ARTERIOVENOUS GRAFT Left 2/8/2022    Procedure: [id number];  Surgeon: Lindsey Louie MD;  Location: [facility] OR;  Service: Vascular;  Laterality: Left;    FISTULOGRAM N/A 2/27/2020    Procedure: Fistulogram;  Surgeon: Noe Benitez MD;  Location: [facility] CATH LAB/EP;  Service: Cardiology;  Laterality: N/A;    HYSTERECTOMY      JOINT REPLACEMENT  2001    total right knee     LUMBAR LAMINECTOMY      x 3, fusion x1    OOPHORECTOMY      PERIPHERAL ANGIOGRAPHY N/A 3/9/2020    Procedure: Peripheral angiography;  Surgeon: Jacinto Henriquez MD;  Location: [facility] CATH LAB/EP;  Service: Cardiology;  Laterality: N/A;    PLACEMENT OF ARTERIOVENOUS GRAFT Left 1/21/2020    Procedure: INSERTION, GRAFT, ARTERIOVENOUS;  Surgeon: Lindsey Louie MD;  Location: [facility] OR;  Service: General;  Laterality: Left;    THROMBECTOMY Left 2/3/2020    Procedure: THROMBECTOMY;  Surgeon: Lindsey Louie MD;  Location: [facility] OR;  Service: General;  Laterality: Left;    THROMBECTOMY  3/9/2020    Procedure: Thrombectomy;  Surgeon: Jacinto Henriquez MD;  Location: [facility] CATH LAB/EP;  Service: Cardiology;;     Family History   Problem Relation Age of Onset    Arthritis Mother     Stroke Mother     Hypertension Father     Cancer Father     Cataracts Father     Diabetes Maternal Aunt     Hypertension Maternal Grandfather     Heart disease Maternal Grandfather     Heart attack Maternal Grandfather     Cataracts Sister     Glaucoma Cousin      Social History     Tobacco Use    Smoking status: Former Smoker     Types: Cigarettes    Smokeless tobacco: Former User     Quit date: 2/3/2015   Substance Use Topics    Alcohol use: Not Currently     Comment: Rare    Drug use: No       Review of patient's allergies indicates:   Allergen Reactions    Aspirin      Other reaction(s): hx of ulcers    Tetracycline Swelling     Other reaction(s): Swelling    Penicillins Rash     Other  "reaction(s): Hives  Other reaction(s): Rash  Other reaction(s): Rash  Other reaction(s): Hives            OBJECTIVE:     Vital Signs (Most Recent)  Vitals:    04/06/22 0724 04/06/22 0726 04/06/22 0920 04/06/22 1040   BP: (!) 96/56      BP Location: Left arm      Patient Position: Lying      Pulse: 70 70     Resp: 19 16 18    Temp: 97.2 °F (36.2 °C)      TempSrc: Oral      SpO2: (!) 91% 99%     Weight:    38.6 kg (85 lb)   Height:    5' 2" (1.575 m)                 Medications:          Physical Exam  Vitals and nursing note reviewed.   Constitutional:       General: She is not in acute distress.     Appearance: She is not diaphoretic.   HENT:      Head: Normocephalic and atraumatic.      Mouth/Throat:      Pharynx: No oropharyngeal exudate.   Eyes:      General: No scleral icterus.     Conjunctiva/sclera: Conjunctivae normal.      Pupils: Pupils are equal, round, and reactive to light.   Cardiovascular:      Rate and Rhythm: Normal rate and regular rhythm.      Heart sounds: Normal heart sounds. No murmur heard.  Pulmonary:      Effort: Pulmonary effort is normal. No respiratory distress.      Breath sounds: Wheezing present.   Abdominal:      General: Bowel sounds are normal. There is no distension.      Palpations: Abdomen is soft.      Tenderness: There is no abdominal tenderness.   Musculoskeletal:         General: Normal range of motion.      Cervical back: Normal range of motion and neck supple.      Comments: AVF no thrill    Skin:     General: Skin is warm and dry.      Findings: No erythema.   Neurological:      Mental Status: She is alert and oriented to person, place, and time.      Cranial Nerves: No cranial nerve deficit.   Psychiatric:         Mood and Affect: Affect normal.         Cognition and Memory: Memory normal.         Judgment: Judgment normal.         Laboratory:  Recent Labs   Lab 04/05/22  1323 04/06/22  0554   WBC 11.24 8.48   HGB 12.3 11.8*   HCT 40.1 38.1    171   MONO 9.8  1.1* " 0.8*  0.1*     Recent Labs   Lab 04/05/22  1323 04/06/22  0554    133*  133*   K 3.9 4.8  4.8   CL 99 101  101   CO2 26 22*  22*   BUN 28* 22  22   CREATININE 2.4* 1.7*  1.7*   CALCIUM 9.6 9.4  9.4   PHOS  --  3.1       Diagnostic Results:  X-Ray: Reviewed  US: Reviewed  Echo: Reviewed  ASSESSMENT/PLAN:     1. ESRD  - HD MWF [facility] With Dr Aura Diggs   -- Missed Monday now SOB hypoxia     HD with [facility] yesterday planned for  today  To Keep MWF however AVG clotted   -- Consult Vascular Surgery   -- Daily Renal Function Panel  -- Avoid Hypotension.  -- Renally dose all meds  2. HTN (I10) -  Controlled   3. Anemia of chronic kidney disease treated with BILL (N18.9 D63.1) -   EPogen  with   HD as needed    Recent Labs   Lab 04/05/22  1323 04/06/22  0554   HGB 12.3 11.8*   HCT 40.1 38.1    171       Iron   Lab Results   Component Value Date    IRON 13 (L) 02/12/2020    TIBC 462 (H) 02/12/2020    FERRITIN 148 07/17/2019       4. MBD (E88.9 M90.80) -  Recent Labs   Lab 04/06/22  0554   CALCIUM 9.4  9.4   PHOS 3.1     Recent Labs   Lab 04/05/22  1323 04/06/22  0554   MG 2.0 2.1  2.1       Lab Results   Component Value Date    .0 (H) 12/28/2018    CALCIUM 9.4 04/06/2022    CALCIUM 9.4 04/06/2022    PHOS 3.1 04/06/2022     Lab Results   Component Value Date    QJMNUPCT94XB 26 (L) 02/12/2020     Sevelamer   Lab Results   Component Value Date    CO2 22 (L) 04/06/2022    CO2 22 (L) 04/06/2022       5. Nutrition/Hypoalbuminemia (E88.09) -   Recent Labs   Lab 04/05/22  1323 04/06/22  0554   ALBUMIN 3.6 3.5     Nepro with meals TID. Renal vitamins daily      Thank you for the consult, will follow  With any question please call [phone]  Ramo Da Silva MD    [facility]  EDGARD Bustillos MD, FACP,   NEIL Diggs MD,   MD CURTIS Benoit MD E. V. Harmon, NP    [address]  [place], [address] [phone]

## 2022-04-06 NOTE — PT/OT/SLP EVAL
Physical Therapy Evaluation    Patient Name:  Katie Quevedo   MRN:  [id number]    Recommendations:     Discharge Recommendations:   (TBD pending progress)   Discharge Equipment Recommendations:  (TBD possibly RW and BSC)   Barriers to discharge: increased assist at this time    Assessment:     Katie Quevedo is a 76 y.o. female admitted with a medical diagnosis of Acute on chronic respiratory failure with hypoxia.  She presents with the following impairments/functional limitations:  weakness, impaired functional mobilty, gait instability, impaired endurance, impaired balance, impaired self care skills, impaired sensation, decreased lower extremity function, decreased coordination, pain, impaired cardiopulmonary response to activity, impaired fine motor .Pt was able to transfer sup<> sit with CGA. Pt performed sit>stand with CGA/HHA and side steps L toward HOB with CGA-Konrad and HHA. BP seated EOB 84/47, however, pt asymptomatic with no c/o dizziness/lightheadedness. Nsg notified. Deferred further ambulation 2/2 low BP and pt declining further activity. D/c recs TBD pending progress and further assessment. DME needs: possibly RW and BSC.     Rehab Prognosis: Good; patient would benefit from acute skilled PT services to address these deficits and reach maximum level of function.    Recent Surgery: Procedure(s) (LRB):  THROMBECTOMY (Left)      Plan:     During this hospitalization, patient to be seen 3 x/week to address the identified rehab impairments via gait training, therapeutic activities, therapeutic exercises, neuromuscular re-education and progress toward the following goals:    · Plan of Care Expires:  05/06/22    Subjective     Chief Complaint: Pain in B shoulders, neck, and upper back, chronic issue  Patient/Family Comments/goals: Return home  Pain/Comfort:  · Pain Rating 1: 8/10  · Location 1:  (neck, B shoulders, and back - pt reports chronic pain)  · Pain Addressed 1: Reposition, Distraction    Patients  "cultural, spiritual, Jainism conflicts given the current situation: no    Living Environment:  Pt lives w/spouse, SSH, THE, walk-in tub with seat  Prior to admission, patients level of function was Ale for functional mobility with AD PRN, pt reports in home she will occasionally "furniture cruise", reports she can only ambulate short distance, needs assistance from  with LBD (socks/shoes). Pt wears 2 L O2 at home. Equipment used at home: power chair, cane, straight, grab bar, rollator, oxygen.   Upon discharge, patient will have assistance from spouse.    Objective:     Communicated with nsg prior to session.  Patient found HOB elevated with bed alarm, telemetry, peripheral IV  upon PT entry to room.    General Precautions: Standard, fall   Orthopedic Precautions:N/A   Braces: N/A  Respiratory Status: Nasal cannula, flow 1.5 L/min    Exams:  · Cognitive Exam:  Patient is oriented to Person, Place, Time and Situation  · Postural Exam:  Patient presented with the following abnormalities:    · -       Rounded shoulders  · -       Forward head  · -       Posterior pelvic tilt  · Sensation:    · -       Impaired  light/touch RLE from knee to foot, pt reporting ~25% of full sensation for quite a few years now and that her MD is aware  · Skin Integrity/Edema:      · -       Skin integrity: Thin  · -       Edema: None noted BLE  · RLE ROM: WFL  · RLE Strength: 4-/5  · LLE ROM: WFL  · LLE Strength: 4-/5    Functional Mobility:  · Bed Mobility:     · Scooting: stand by assistance  · Supine to Sit: contact guard assistance  · Sit to Supine: contact guard assistance  · Transfers:     · Sit to Stand:  contact guard assistance with hand-held assist  · Gait: Pt took ~3 side steps L toward HOB with HHA and CGA-Konrad    Therapeutic Activities and Exercises:   Pt educated on role of PT/POC and pt agreeable to therapy session but declined ambulating around room / in sanchez.   Pt was able to transfer sup<> sit with CGA.  BP " seated EOB 84/47, however, pt asymptomatic with no c/o dizziness/lightheadedness. Nsg notified.   Pt performed sit>stand with CGA/HHA and side steps L toward HOB with CGA-Konrad and HHA.   Deferred further ambulation 2/2 low BP and pt declining further activity.  Pt educated on LE exercises and discussed possible need for DME.     AM-PAC 6 CLICK MOBILITY  Total Score:17     Patient left HOB elevated with all lines intact, call button in reach, bed alarm on, nsg notified and spouse present.    GOALS:   Multidisciplinary Problems     Physical Therapy Goals        Problem: Physical Therapy    Goal Priority Disciplines Outcome Goal Variances Interventions   Physical Therapy Goal     PT, PT/OT Ongoing, Progressing     Description: Goals to be met by: 22     Patient will increase functional independence with mobility by performin. Supine to sit with Modified [place]  2. Sit to stand transfer with Supervision  3. Bed to chair transfer with Supervision with or without an AD.  4. Gait  x 50 feet with Supervision with or without an AD.                     History:     Past Medical History:   Diagnosis Date    Acute congestive heart failure 02/10/2020    Anemia     Bilateral renal cysts     Cataract     Chronic LBP 2012    Chronic pain     CKD (chronic kidney disease), stage IV     Colon polyp     COPD (chronic obstructive pulmonary disease)     Dehydration     Encounter for blood transfusion     HTN (hypertension)     Lumbar spondylosis     Melanoma     of the lip    Metabolic bone disease     Migraines, neuralgic     Osteoporosis     Primary osteoarthritis of both knees     s/p Rt TKA    Pulmonary embolism with infarction     Seizures 1972    x1 only    Subdeltoid bursitis, L>R. 2012    Ulcer     Vitamin D deficiency disease        Past Surgical History:   Procedure Laterality Date    BLADDER SUSPENSION      CATARACT EXTRACTION  13    left eye    CERVICAL  LAMINECTOMY      x3, fusion x1    COLONOSCOPY  2009    DECLOTTING OF ARTERIOVENOUS GRAFT Left 1/3/2022    Procedure: [id number];  Surgeon: Lindsey Louie MD;  Location: [facility] OR;  Service: Vascular;  Laterality: Left;    DECLOTTING OF ARTERIOVENOUS GRAFT Left 2/8/2022    Procedure: [id number];  Surgeon: Lindsey Louie MD;  Location: [facility] OR;  Service: Vascular;  Laterality: Left;    FISTULOGRAM N/A 2/27/2020    Procedure: Fistulogram;  Surgeon: Noe Benitez MD;  Location: [facility] CATH LAB/EP;  Service: Cardiology;  Laterality: N/A;    HYSTERECTOMY      JOINT REPLACEMENT  2001    total right knee     LUMBAR LAMINECTOMY      x 3, fusion x1    OOPHORECTOMY      PERIPHERAL ANGIOGRAPHY N/A 3/9/2020    Procedure: Peripheral angiography;  Surgeon: Jacinto Henriquez MD;  Location: [facility] CATH LAB/EP;  Service: Cardiology;  Laterality: N/A;    PLACEMENT OF ARTERIOVENOUS GRAFT Left 1/21/2020    Procedure: INSERTION, GRAFT, ARTERIOVENOUS;  Surgeon: Lindsey Louie MD;  Location: [facility] OR;  Service: General;  Laterality: Left;    THROMBECTOMY Left 2/3/2020    Procedure: THROMBECTOMY;  Surgeon: Lindsey Louie MD;  Location: [facility] OR;  Service: General;  Laterality: Left;    THROMBECTOMY  3/9/2020    Procedure: Thrombectomy;  Surgeon: Jacinto Henriquez MD;  Location: [facility] CATH LAB/EP;  Service: Cardiology;;       Time Tracking:     PT Received On: 04/06/22  PT Start Time: 1405     PT Stop Time: 1420  PT Total Time (min): 15 min     Billable Minutes: Evaluation 15 (Co-eval with OT)      04/06/2022

## 2022-04-06 NOTE — PLAN OF CARE
The proper method of use, as well as anticipated side effects, of this aerosol treatment are discussed and demonstrated to the patient.

## 2022-04-06 NOTE — PROGRESS NOTES
04/05/22 2332   Admission   Initial VN Admission Questions Complete   Communication Issues? None   Shift   Virtual Nurse - Patient Verbalized Approval Of Camera Use   Safety/Activity   Patient Rounds bed in low position;call light in patient/parent reach;visualized patient   Safety Promotion/Fall Prevention Fall Risk reviewed with patient/family

## 2022-04-06 NOTE — CONSULTS
Today`s Date: 4/6/2022     Admit Date: 4/5/2022    Admitting Physician: Jennifer Bowles*    Patient`s Name: Katie Quevedo , 76 y.o. female    Reason for consultation  Clotted graft left upper arm   Patient Active Problem List:     Melanoma     Chronic pain     Vitamin deficiency     Cervical post-laminectomy syndrome     Lumbar postlaminectomy syndrome     Lumbosacral spondylosis without myelopathy     Isolated cervical dystonia     Primary osteoarthritis of both knees     Subdeltoid bursitis, L>R.     Other fragments of torsion dystonia     Nuclear sclerosis - Both Eyes     Rotator cuff tear, right     Biceps tendonitis     Osteoarthritis, hip, bilateral     Lumbar radiculopathy, BLE     Cervical radiculopathy, BUE     Osteoporosis     Iron deficiency anemia     Essential hypertension     Atrophy of left kidney     Kidney cysts     History of colon polyps     Acute on chronic respiratory failure with hypoxia     Pleural effusion, left     Pericardial effusion     Shortness of breath     Chronic respiratory failure with hypoxia, on home O2 therapy     Lipoma of torso     Chronic diastolic heart failure     COPD (chronic obstructive pulmonary disease)     Non-intractable vomiting with nausea     Diastolic dysfunction, left ventricle     Acute congestive heart failure     ESRD (end stage renal disease) on dialysis     Diarrhea     Dialysis AV fistula malfunction, sequela     Medication management     SOB (shortness of breath)     Acute on chronic heart failure     Pleural effusion     Orthopnea     Secondary hyperparathyroidism     Clotted dialysis access      Past Medical History:  02/10/2020: Acute congestive heart failure  No date: Anemia  No date: Bilateral renal cysts  No date: Cataract  7/26/2012: Chronic LBP  No date: Chronic pain  No date: CKD (chronic kidney disease), stage IV  2013: Colon polyp  No date: COPD (chronic obstructive pulmonary disease)  No date: Dehydration  No date: Encounter for blood  transfusion  No date: HTN (hypertension)  No date: Lumbar spondylosis  No date: Melanoma      Comment:  of the lip  No date: Metabolic bone disease  No date: Migraines, neuralgic  No date: Osteoporosis  No date: Primary osteoarthritis of both knees      Comment:  s/p Rt TKA  No date: Pulmonary embolism with infarction  1972: Seizures      Comment:  x1 only  9/27/2012: Subdeltoid bursitis, L>R.  No date: Ulcer  No date: Vitamin D deficiency disease    Past Surgical History:  No date: BLADDER SUSPENSION  11/18/13: CATARACT EXTRACTION      Comment:  left eye  No date: CERVICAL LAMINECTOMY      Comment:  x3, fusion x1  2009: COLONOSCOPY  1/3/2022: DECLOTTING OF ARTERIOVENOUS GRAFT; Left      Comment:  Procedure: [id number];  Surgeon: Lindsey Louie MD;  Location: [facility] OR;  Service: Vascular;                 Laterality: Left;  2/8/2022: DECLOTTING OF ARTERIOVENOUS GRAFT; Left      Comment:  Procedure: [id number];  Surgeon: Lindsey Louie MD;  Location: [facility] OR;  Service: Vascular;                 Laterality: Left;  2/27/2020: FISTULOGRAM; N/A      Comment:  Procedure: Fistulogram;  Surgeon: Noe Benitez MD;                 Location: [facility] CATH LAB/EP;  Service: Cardiology;                 Laterality: N/A;  No date: HYSTERECTOMY  2001: JOINT REPLACEMENT      Comment:  total right knee   No date: LUMBAR LAMINECTOMY      Comment:  x 3, fusion x1  No date: OOPHORECTOMY  3/9/2020: PERIPHERAL ANGIOGRAPHY; N/A      Comment:  Procedure: Peripheral angiography;  Surgeon: Jacinto Henriquez MD;  Location: [facility] CATH LAB/EP;  Service:                Cardiology;  Laterality: N/A;  1/21/2020: PLACEMENT OF ARTERIOVENOUS GRAFT; Left      Comment:  Procedure: INSERTION, GRAFT, ARTERIOVENOUS;  Surgeon:                Lindsey Louie MD;  Location: [facility] OR;  Service:                General;  Laterality: Left;  2/3/2020: THROMBECTOMY; Left      Comment:   Procedure: THROMBECTOMY;  Surgeon: Lindsey Louie MD;  Location: [facility] OR;  Service: General;  Laterality:                Left;  3/9/2020: THROMBECTOMY      Comment:  Procedure: Thrombectomy;  Surgeon: Jacinto Henriquez MD;               Location: [facility] CATH LAB/EP;  Service: Cardiology;;    Prior to Admission medications :  Medication acetaminophen (TYLENOL) 325 MG tablet, Sig Take 1 tablet (325 mg total) by mouth every 6 (six) hours as needed for Pain., Start Date 2/8/22, End Date , Taking? Yes, Authorizing Provider Lindsey Louie MD    Medication albuterol (VENTOLIN HFA) 90 mcg/actuation inhaler, Sig Inhale 2 puffs into the lungs every 6 (six) hours as needed for Wheezing or Shortness of Breath. Rescue, Start Date 12/8/20, End Date , Taking? Yes, Authorizing Provider Rona Ramsey NP    Medication albuterol-ipratropium (DUO-NEB) 2.5 mg-0.5 mg/3 mL nebulizer solution, Sig Take 3 mLs by nebulization every 6 (six) hours as needed for Shortness of Breath. Rescue, Start Date 10/29/21, End Date 10/29/22, Taking? Yes, Authorizing Provider Giancarlo Rust MD    Medication B complex-vitamin C-folic acid (LINDA-RADHA) 0.8 mg Tab, Sig Take 1 tablet (0.8 mg total) by mouth once daily., Start Date 11/12/20, End Date , Taking? Yes, Authorizing Provider Rachel Alexander NP    Medication clonazePAM (KLONOPIN) 1 MG tablet, Sig TAKE 1 TABLET BY MOUTH 3 TIMES WEEKLY ON DIALYSIS DAYS, Start Date 1/28/22, End Date , Taking? Yes, Authorizing Provider     Medication diclofenac sodium (VOLTAREN) 1 % Gel, Sig Apply 2 g topically 3 (three) times daily as needed (apply)., Start Date 10/28/21, End Date 12/18/21, Taking? Yes, Authorizing Provider Pushpa Mcmahon MD    Medication fluticasone-umeclidin-vilanter (TRELEGY ELLIPTA) 200-62.5-25 mcg inhaler, Sig Inhale 1 puff into the lungs once daily., Start Date 3/30/21, End Date , Taking? Yes, Authorizing Provider Kingston Verduzco MD    Medication  HYDROcodone-acetaminophen (NORCO)  mg per tablet, Sig Take 1 tablet by mouth 3 (three) times daily as needed for Pain., Start Date 3/17/22, End Date 4/17/22, Taking? Yes, Authorizing Provider Pushpa Mcmahon MD    Medication mirtazapine (REMERON) 15 MG tablet, Sig Take 1 tablet (15 mg total) by mouth every evening., Start Date 10/20/21, End Date 10/20/22, Taking? Yes, Authorizing Provider Sonu Martínez MD    Medication sevelamer carbonate (RENVELA) 800 mg Tab, Sig Take 1 tablet (800 mg total) by mouth after meals as needed., Start Date 2/18/22, End Date , Taking? Yes, Authorizing Provider Alta Galvez NP    Medication allopurinoL (ZYLOPRIM) 100 MG tablet, Sig Take 1 tablet (100 mg total) by mouth on Tuesday, Thursday, Saturday, and Sunday., Start Date 2/14/20, End Date , Taking? , Authorizing Provider Aura Diggs MD    Medication busPIRone (BUSPAR) 10 MG tablet, Sig Take 10 mg by mouth once daily., Start Date 5/18/21, End Date , Taking? , Authorizing Provider Historical Provider    Medication diphenhydrAMINE (BENADRYL) 25 mg capsule, Sig Take 25 mg by mouth every 6 (six) hours as needed for Itching., Start Date , End Date , Taking? , Authorizing Provider Historical Provider    Medication epoetin hemant-epbx (RETACRIT) 10,000 unit/mL imjection, Sig Inject 0.5 mLs (5,000 Units total) into the skin every Mon, Wed, Fri., Start Date 3/2/20, End Date , Taking? , Authorizing Provider Ramos Navarro,     Medication levoFLOXacin (LEVAQUIN) 500 MG tablet, Sig Take every other day after returning home from dialysis., Start Date 2/14/22, End Date , Taking? , Authorizing Provider Aura Diggs MD    Medication lidocaine-prilocaine (EMLA) cream, Sig Apply topically to left arm prior to dialysis., Start Date 2/14/20, End Date , Taking? , Authorizing Provider Aura Diggs MD    Medication zolpidem (AMBIEN) 5 MG Tab, Sig Take 5 mg by mouth nightly., Start Date 11/11/20, End Date , Taking? , Authorizing  Provider Historical Provider      No current facility-administered medications on file prior to encounter.  Current Outpatient Medications on File Prior to Encounter:  acetaminophen (TYLENOL) 325 MG tablet, Take 1 tablet (325 mg total) by mouth every 6 (six) hours as needed for Pain., Disp: 10 tablet, Rfl: 0  albuterol (VENTOLIN HFA) 90 mcg/actuation inhaler, Inhale 2 puffs into the lungs every 6 (six) hours as needed for Wheezing or Shortness of Breath. Rescue, Disp: 18 g, Rfl: 3  albuterol-ipratropium (DUO-NEB) 2.5 mg-0.5 mg/3 mL nebulizer solution, Take 3 mLs by nebulization every 6 (six) hours as needed for Shortness of Breath. Rescue, Disp: 180 mL, Rfl: 11  B complex-vitamin C-folic acid (LINDA-RADHA) 0.8 mg Tab, Take 1 tablet (0.8 mg total) by mouth once daily., Disp: 30 tablet, Rfl: 11  clonazePAM (KLONOPIN) 1 MG tablet, TAKE 1 TABLET BY MOUTH 3 TIMES WEEKLY ON DIALYSIS DAYS, Disp: 15 tablet, Rfl: 2  diclofenac sodium (VOLTAREN) 1 % Gel, Apply 2 g topically 3 (three) times daily as needed (apply)., Disp: 300 g, Rfl: 3  fluticasone-umeclidin-vilanter (TRELEGY ELLIPTA) 200-62.5-25 mcg inhaler, Inhale 1 puff into the lungs once daily., Disp: 60 each, Rfl: 11  HYDROcodone-acetaminophen (NORCO)  mg per tablet, Take 1 tablet by mouth 3 (three) times daily as needed for Pain., Disp: 90 tablet, Rfl: 0  mirtazapine (REMERON) 15 MG tablet, Take 1 tablet (15 mg total) by mouth every evening., Disp: 30 tablet, Rfl: 11  sevelamer carbonate (RENVELA) 800 mg Tab, Take 1 tablet (800 mg total) by mouth after meals as needed., Disp: 90 tablet, Rfl: 11  allopurinoL (ZYLOPRIM) 100 MG tablet, Take 1 tablet (100 mg total) by mouth on Tuesday, Thursday, Saturday, and Sunday., Disp: 30 tablet, Rfl: 0  busPIRone (BUSPAR) 10 MG tablet, Take 10 mg by mouth once daily., Disp: , Rfl:   diphenhydrAMINE (BENADRYL) 25 mg capsule, Take 25 mg by mouth every 6 (six) hours as needed for Itching., Disp: , Rfl:   epoetin hemant-epbx (RETACRIT)  10,000 unit/mL imjection, Inject 0.5 mLs (5,000 Units total) into the skin every Mon, Wed, Fri., Disp: , Rfl:   levoFLOXacin (LEVAQUIN) 500 MG tablet, Take every other day after returning home from dialysis., Disp: 5 tablet, Rfl: 0  lidocaine-prilocaine (EMLA) cream, Apply topically to left arm prior to dialysis., Disp: 30 g, Rfl: 0  zolpidem (AMBIEN) 5 MG Tab, Take 5 mg by mouth nightly., Disp: , Rfl:          Review of patient's allergies indicates:   -- Aspirin     --  Other reaction(s): hx of ulcers   -- Tetracycline -- Swelling    --  Other reaction(s): Swelling   -- Penicillins -- Rash    --  Other reaction(s): Hives             Other reaction(s): Rash             Other reaction(s): Rash             Other reaction(s): Hives    Social History:   reports that she has quit smoking. Her smoking use included cigarettes. She quit smokeless tobacco use about 7 years ago. She reports previous alcohol use. She reports that she does not use drugs.     Review of patient's family history indicates:  Problem: Arthritis      Relation: Mother          Age of Onset: (Not Specified)  Problem: Stroke      Relation: Mother          Age of Onset: (Not Specified)  Problem: Hypertension      Relation: Father          Age of Onset: (Not Specified)  Problem: Cancer      Relation: Father          Age of Onset: (Not Specified)  Problem: Cataracts      Relation: Father          Age of Onset: (Not Specified)  Problem: Diabetes      Relation: Maternal Aunt          Age of Onset: (Not Specified)  Problem: Hypertension      Relation: Maternal Grandfather          Age of Onset: (Not Specified)  Problem: Heart disease      Relation: Maternal Grandfather          Age of Onset: (Not Specified)  Problem: Heart attack      Relation: Maternal Grandfather          Age of Onset: (Not Specified)  Problem: Cataracts      Relation: Sister          Age of Onset: (Not Specified)  Problem: Glaucoma      Relation: Cousin          Age of Onset: (Not  Specified)      PHYSICAL EXAMINATION  Temp:  [96.6 °F (35.9 °C)-98.2 °F (36.8 °C)] 97.1 °F (36.2 °C)  Pulse:  [] 76  Resp:  [16-23] 18  SpO2:  [91 %-100 %] 95 %  BP: ()/(52-64) 84/52    General Condition:   alert x 3    Head & Neck  Anemia: None  Jaundice: None  Neck vein: Not distended  Carotid Bruits: none  Lymph nodes: none palpable  Thyroid: normal    Chest: normal    Heart: normal    Rt. Breast: not examined  Lt. Breast: not examined  Axillary lymph nodes: none    Abdomen: Soft,  None tender with no palpable mass or organ  Hernia: none    Rectal: Defered    Extremities: normal  Clotted graft left arm  Vascular: normal    Specific focus Examination      Imp: clotted graft left upper arm, crf    Pl;an: declotting in am

## 2022-04-06 NOTE — PROGRESS NOTES
[facility] Medicine  Progress Note    Patient Name: Katie Quevedo  MRN: [id number]  Patient Class: IP- Inpatient   Admission Date: 4/5/2022  Length of Stay: 1 days  Attending Physician: Jennifer Bowles*  Primary Care Provider: Kingston Verduzco MD        Subjective:     Principal Problem:Acute on chronic respiratory failure with hypoxia        HPI:  Katie Quevedo is 67 y/o female with PMH of COPD, ESRD on HD, hypertension, AVF clot, and CHF presents with shortness of breath. Patient reports her shortness of breath started last night. Patient states it was sudden, was not relieved by her albuterol inhaler. Patient went to dialysis this morning and was accessed but did not receive any dialysis. Per EMS, the patient's SpO2 was in the 70s on their arrival.  The patient received solu-medrol x1, but did not receive any breathing treatments. Patient reports productive cough with yellow sputum production. Patient denies chest pain, headache, dizziness, lightheadedness, abdominal pain, nausea, vomiting, dysuria, hematuria, diarrhea, constipation, black/bloody stools. She reports daily use of her inhalers.  She reports multiple ED visits for breathing issues. History of tobacco use but denies current use of tobacco. Patient will be placed into hospital medicine observation services under my care in collaboration with Dr. Gulshan Goins.         Overview/Hospital Course:  No notes on file    Interval History:  Awake and alert on 1 L nasal cannula, no reported event-continue nebs and switch steroid to p.o.  Blood pressure down trending  ESRD- nephrology on board    Review of Systems   Constitutional:  Negative for chills, diaphoresis and fever.   HENT:  Negative for hearing loss and sore throat.    Respiratory:  Positive for cough and shortness of breath.    Cardiovascular:  Negative for chest pain and leg swelling.   Gastrointestinal:  Negative for abdominal pain, diarrhea, nausea and vomiting.   Genitourinary:   Negative for difficulty urinating and flank pain.   Musculoskeletal:  Negative for back pain.   Skin:  Negative for rash and wound.   Neurological:  Negative for dizziness, weakness and headaches.   Psychiatric/Behavioral:  Negative for agitation and confusion.    Objective:     Vital Signs (Most Recent):  Temp: 97.2 °F (36.2 °C) (04/06/22 0724)  Pulse: 70 (04/06/22 0724)  Resp: 19 (04/06/22 0724)  BP: (!) 96/56 (04/06/22 0724)  SpO2: (!) 91 % (04/06/22 0724)   Vital Signs (24h Range):  Temp:  [96.6 °F (35.9 °C)-97.8 °F (36.6 °C)] 97.2 °F (36.2 °C)  Pulse:  [] 70  Resp:  [16-43] 19  SpO2:  [91 %-100 %] 91 %  BP: ()/(53-65) 96/56     Weight: 38.9 kg (85 lb 12.1 oz)  Body mass index is 15.69 kg/m².    Intake/Output Summary (Last 24 hours) at 4/6/2022 0732  Last data filed at 4/5/2022 1915  Gross per 24 hour   Intake --   Output 1700 ml   Net -1700 ml      Physical Exam  Vitals and nursing note reviewed.   Constitutional:       General: She is not in acute distress.     Appearance: Normal appearance. She is not ill-appearing.      Interventions: Nasal cannula in place.      Comments: 2L   HENT:      Head: Normocephalic and atraumatic.   Cardiovascular:      Rate and Rhythm: Normal rate and regular rhythm.      Pulses: Normal pulses.      Heart sounds: Normal heart sounds. No murmur heard.    No friction rub. No gallop.      Arteriovenous access: Left arteriovenous access is present.  Pulmonary:      Effort: Pulmonary effort is normal. No respiratory distress.      Breath sounds: Examination of the right-lower field reveals decreased breath sounds. Examination of the left-lower field reveals decreased breath sounds. Decreased breath sounds present. No wheezing or rhonchi.   Abdominal:      General: Bowel sounds are normal. There is no distension.      Palpations: Abdomen is soft.      Tenderness: There is no abdominal tenderness. There is no guarding.   Musculoskeletal:         General: No swelling.       Cervical back: Normal range of motion and neck supple.      Right lower leg: No edema.      Left lower leg: No edema.   Skin:     General: Skin is warm and dry.      Capillary Refill: Capillary refill takes less than 2 seconds.      Findings: No bruising, erythema or rash.   Neurological:      General: No focal deficit present.      Mental Status: She is alert and oriented to person, place, and time.      GCS: GCS eye subscore is 4. GCS verbal subscore is 5. GCS motor subscore is 6.   Psychiatric:         Mood and Affect: Mood normal.         Behavior: Behavior normal. Behavior is cooperative.       Significant Labs: A1C: No results for input(s): HGBA1C in the last 4320 hours.  ABGs:   Recent Labs   Lab 04/05/22  1542   PH 7.441   PCO2 37.9   HCO3 25.8   POCSATURATED 81*   BE 2   PO2 43*     Blood Culture: No results for input(s): LABBLOO in the last 48 hours.  CBC:   Recent Labs   Lab 04/05/22  1323 04/06/22  0554   WBC 11.24 8.48   HGB 12.3 11.8*   HCT 40.1 38.1    171     CMP:   Recent Labs   Lab 04/05/22  1323 04/06/22  0554    133*  133*   K 3.9 4.8  4.8   CL 99 101  101   CO2 26 22*  22*   GLU 97 149*  149*   BUN 28* 22  22   CREATININE 2.4* 1.7*  1.7*   CALCIUM 9.6 9.4  9.4   PROT 8.4 8.0   ALBUMIN 3.6 3.5   BILITOT 0.8 0.8   ALKPHOS 374* 323*   AST 16 17   ALT 8* 7*   ANIONGAP 13 10  10   EGFRNONAA 19* 28*  28*     Lactic Acid: No results for input(s): LACTATE in the last 48 hours.  Lipase: No results for input(s): LIPASE in the last 48 hours.  Lipid Panel: No results for input(s): CHOL, HDL, LDLCALC, TRIG, CHOLHDL in the last 48 hours.  Magnesium:   Recent Labs   Lab 04/05/22  1323 04/06/22  0554   MG 2.0 2.1  2.1     Troponin:   Recent Labs   Lab 04/05/22  1323   TROPONINI 0.011     TSH: No results for input(s): TSH in the last 4320 hours.  Urine Culture: No results for input(s): LABURIN in the last 48 hours.  Urine Studies: No results for input(s): COLORU, APPEARANCEUA, PHUR,  SPECGRAV, PROTEINUA, GLUCUA, KETONESU, BILIRUBINUA, OCCULTUA, NITRITE, UROBILINOGEN, LEUKOCYTESUR, RBCUA, WBCUA, BACTERIA, SQUAMEPITHEL, HYALINECASTS in the last 48 hours.    Invalid input(s): FELIX    Significant Imaging: I have reviewed all pertinent imaging results/findings within the past 24 hours.      Assessment/Plan:      * Acute on chronic respiratory failure with hypoxia  Chronic respiratory failure with hypoxia, on 2L home O2 therapy    Clinically-stable.  Afebrile. No Elevation of WBC, Productive Cough.  --Continue Solumedrol 125 mg IV every 12 hours and switch to PO r  --continue Breo   --DuoNeb breathing Treatments every 4 hours while awake  --PRN Oxygen per Nasal Cannula for SpO2 <88%  --Pulse-Ox Monitoring every 4 hours  --Monitor respiratory status closely  -Continue supplemental oxygen; titrate and wean to maintain SpO2 >88%  -Duo-nebs PRN for SOB/Wheezing  -ABG PRN         ESRD (end stage renal disease) on dialysis  -HD MWF; TIGIST fistula + thrill  -Nephrology consulted for HD management        COPD (chronic obstructive pulmonary disease)        Chronic respiratory failure with hypoxia, on home O2 therapy  See above      VTE Risk Mitigation (From admission, onward)         Ordered     heparin (porcine) injection 5,000 Units  Every 8 hours         04/05/22 1507     IP VTE HIGH RISK PATIENT  Once         04/05/22 1507     Place sequential compression device  Until discontinued         04/05/22 1507                Discharge Planning   LAITH:      Code Status: Full Code   Is the patient medically ready for discharge?:     Reason for patient still in hospital (select all that apply): Patient trending condition  Discharge Plan A: Home with family                  Jennifer Bowles MD  Department of Hospital Medicine   [facility]

## 2022-04-06 NOTE — PLAN OF CARE
Pt would benefit from cont OT services in order to maximize functional independence. Recommending TBD pending ongoing assessment. OT ruddy performed this date with PT, pt agreeable to EOB ax. Pt performed bed mobility with CGA. EOB BP 84/47; nsg notified of BP, pt asymptomatic with no c/o dizziness/lightheadedness. Pt performed sit<>stand with CGA & HHA. Pt performed side steps to HOB with Konrad/CGA & HHA & then returned to supine. Will progress as able.     Problem: Occupational Therapy  Goal: Occupational Therapy Goal  Description: Goals to be met by: 5/6/2022     Patient will increase functional independence with ADLs by performing:    UE Dressing with Modified [place].  Grooming while standing with Modified [place].  Toileting from toilet with Modified [place] for hygiene and clothing management.   Step transfer with Modified [place] & appropriate AD.  Toilet transfer to toilet with Modified [place] & appropriate AD.   Increased functional strength to WFL for self-care.    Outcome: Ongoing, Progressing

## 2022-04-06 NOTE — HPI
Katie Quevedo is 67 y/o female with PMH of COPD, ESRD on HD, hypertension, AVF clot, and CHF presents with shortness of breath. Patient reports her shortness of breath started last night. Patient states it was sudden, was not relieved by her albuterol inhaler. Patient went to dialysis this morning and was accessed but did not receive any dialysis. Per EMS, the patient's SpO2 was in the 70s on their arrival.  The patient received solu-medrol x1, but did not receive any breathing treatments. Patient reports productive cough with yellow sputum production. Patient denies chest pain, headache, dizziness, lightheadedness, abdominal pain, nausea, vomiting, dysuria, hematuria, diarrhea, constipation, black/bloody stools. She reports daily use of her inhalers.  She reports multiple ED visits for breathing issues. History of tobacco use but denies current use of tobacco. Patient will be placed into hospital medicine observation services under my care in collaboration with Dr. Gulshan Goins.

## 2022-04-06 NOTE — NURSING
L UA AVG found to have no bruit/thrill upon assessment. Attempted to cannulate arterial aspect with 16G. No flashback observed. Dr. Da Silva with nephrology notified. States he will contact Dr. Louie. Patient informed of current POC. Agreeable at this time. HD set up/ cancellation charged.

## 2022-04-06 NOTE — PLAN OF CARE
Problem: Physical Therapy  Goal: Physical Therapy Goal  Description: Goals to be met by: 22     Patient will increase functional independence with mobility by performin. Supine to sit with Modified [place]  2. Sit to stand transfer with Supervision  3. Bed to chair transfer with Supervision with or without an AD.  4. Gait  x 50 feet with Supervision with or without an AD.    Outcome: Ongoing, Progressing     PT Eval completed, note to follow. Pt was able to transfer sup<> sit with CGA. Pt performed sit>stand with CGA/HHA and side steps L toward HOB with CGA-Konrad and HHA. BP seated EOB 84/47, however, pt asymptomatic with no c/o dizziness/lightheadedness. Nsg notified. Deferred further ambulation 2/2 low BP and pt declining further activity. D/c recs TBD pending progress and further assessment. DME needs: possibly RW and BSC.

## 2022-04-06 NOTE — NURSING
Tried calling report to 4th floor nurse DANIEL Whittaker, but was told that he never received report from the ED. The pt is currently in dialysis waiting for ED report to be called and room to be clean.

## 2022-04-06 NOTE — ASSESSMENT & PLAN NOTE
Chronic respiratory failure with hypoxia, on 2L home O2 therapy    Clinically-stable.  Afebrile. No Elevation of WBC, Productive Cough.  --Continue Solumedrol 125 mg IV every 12 hours and switch to PO r  --continue Breo   --DuoNeb breathing Treatments every 4 hours while awake  --PRN Oxygen per Nasal Cannula for SpO2 <88%  --Pulse-Ox Monitoring every 4 hours  --Monitor respiratory status closely  -Continue supplemental oxygen; titrate and wean to maintain SpO2 >88%  -Duo-nebs PRN for SOB/Wheezing  -ABG PRN

## 2022-04-06 NOTE — PT/OT/SLP EVAL
Occupational Therapy   Evaluation    Name: Katie Quevedo  MRN: [id number]  Admitting Diagnosis:  Acute on chronic respiratory failure with hypoxia  Recent Surgery: * No surgery found *      Recommendations:     Discharge Recommendations: other (see comments) (TBD pending progress)  Discharge Equipment Recommendations:  other (see comments) (TBD possibly RW & BSC)  Barriers to discharge:  Other (Comment) (Pt requires increased assistance at this time)    Assessment:     Katie Quevedo is a 76 y.o. female with a medical diagnosis of Acute on chronic respiratory failure with hypoxia.  She presents with The primary encounter diagnosis was COPD exacerbation. Diagnoses of SOB (shortness of breath), ESRD (end stage renal disease), Hypoxia, Acute respiratory failure with hypoxia and hypercapnia, Chest pain, CHF (congestive heart failure), and Acute on chronic respiratory failure with hypoxia were also pertinent to this visit. Performance deficits affecting function: weakness, impaired cardiopulmonary response to activity, decreased ROM, decreased upper extremity function, decreased lower extremity function, impaired balance, gait instability, impaired functional mobilty, impaired self care skills, impaired endurance, impaired fine motor, pain, decreased coordination.      Pt would benefit from cont OT services in order to maximize functional independence. Recommending TBD pending ongoing assessment. OT eval performed this date with PT, pt agreeable to EOB ax. Pt performed bed mobility with CGA. EOB BP 84/47; nsg notified of BP, pt asymptomatic with no c/o dizziness/lightheadedness. Pt performed sit<>stand with CGA & HHA. Pt performed side steps to HOB with Konrad/CGA & HHA & then returned to supine. Will progress as able.     Rehab Prognosis: Good; patient would benefit from acute skilled OT services to address these deficits and reach maximum level of function.       Plan:     Patient to be seen 3 x/week to address the above listed  "problems via self-care/home management, therapeutic activities, therapeutic exercises  · Plan of Care Expires: 05/06/22  · Plan of Care Reviewed with: patient, spouse    Subjective     Chief Complaint: Pain in B shoulders & upper back pt reports is chronic   Patient/Family Comments/goals: Return home    Occupational Profile:  Living Environment: Pt lives w/spouse, SSH, THE, walk-in tub with seat  Previous level of function: Pt reports Ale for functional mobility with AD PRN, pt reports in home she will occasionally "furniture cruise", reports she can only ambulate short distance, needs assistance from  with LBD (socks/shoes)  Roles and Routines: Not driving currently reports she would like to resume,  performed IADLs  Equipment Used at Home:  power chair, cane, straight, grab bar, rollator, oxygen  Assistance upon Discharge: Spouse    Pain/Comfort:  · Pain Rating 1: 8/10  · Location 1:  (B shoulders & back)  · Pain Addressed 1: Reposition, Distraction, Cessation of Activity  · Pain Rating Post-Intervention 1: other (see comments) (not stated)    Patients cultural, spiritual, [employer]ism conflicts given the current situation: no    Objective:     Communicated with: nskyung prior to session.  Patient found HOB elevated with bed alarm, telemetry, peripheral IV upon OT entry to room.    General Precautions: Standard, fall   Orthopedic Precautions:N/A   Braces: N/A  Respiratory Status: Nasal cannula, flow 1 L/min    Occupational Performance:    Bed Mobility:    · Patient completed Scooting/Bridging with stand by assistance to HOB  · Patient completed Supine to Sit with contact guard assistance  · Patient completed Sit to Supine with contact guard assistance    Functional Mobility/Transfers:  · Patient completed Sit <> Stand Transfer with contact guard assistance  with  hand-held assist   · Functional Mobility: Pt performed side steps to HOB with Konrad/CGA and HHA then returned to supine    Activities of Daily " Living:  · Lower Body Dressing: stand by assistance seated EOB to mihai B socks    Cognitive/Visual Perceptual:  Cognitive/Psychosocial Skills:     -       Oriented to: Person, Place, Time and Situation   -       Memory: No Deficits noted  -       Mood/Affect/Coping skills/emotional control: Cooperative and Pleasant    Physical Exam:  Sensation:    Upper Extremity Range of Motion:     -       Right Upper Extremity: WFL except decreased shoulder flex ~90 & decreased ER  -       Left Upper Extremity: decreased shoulder flex ~90 & decreased ER  Upper Extremity Strength:    -       Right Upper Extremity: 3/5  -       Left Upper Extremity: 3/5 distally; proximally not tested 2/2 AVF   Strength:    -       Right Upper Extremity: Fair-  -       Left Upper Extremity: Fair-    AMPAC 6 Click ADL:  AMPAC Total Score: 20    Treatment & Education:  OT ruddy performed this date with PT, pt agreeable to EOB ax.   Pt with c/o B shoulder & back pain that is chronic.   Pt performed bed mobility with CGA. EOB BP 84/47; nsg notified of BP, pt asymptomatic with no c/o dizziness/lightheadedness.   Pt performed sit<>stand with CGA & HHA.   Pt performed side steps to HOB with Konrad/CGA & HHA & then returned to supine.   Will progress as able.   Education:    Patient left HOB elevated with all lines intact, call button in reach, bed alarm on, nsg notified and spouse present    GOALS:   Multidisciplinary Problems     Occupational Therapy Goals        Problem: Occupational Therapy    Goal Priority Disciplines Outcome Interventions   Occupational Therapy Goal     OT, PT/OT Ongoing, Progressing    Description: Goals to be met by: 5/6/2022     Patient will increase functional independence with ADLs by performing:    UE Dressing with Modified [place].  Grooming while standing with Modified [place].  Toileting from toilet with Modified [place] for hygiene and clothing management.   Step transfer with Modified [place] &  appropriate AD.  Toilet transfer to toilet with Modified [place] & appropriate AD.   Increased functional strength to WFL for self-care.                     History:     Past Medical History:   Diagnosis Date    Acute congestive heart failure 02/10/2020    Anemia     Bilateral renal cysts     Cataract     Chronic LBP 7/26/2012    Chronic pain     CKD (chronic kidney disease), stage IV     Colon polyp 2013    COPD (chronic obstructive pulmonary disease)     Dehydration     Encounter for blood transfusion     HTN (hypertension)     Lumbar spondylosis     Melanoma     of the lip    Metabolic bone disease     Migraines, neuralgic     Osteoporosis     Primary osteoarthritis of both knees     s/p Rt TKA    Pulmonary embolism with infarction     Seizures 1972    x1 only    Subdeltoid bursitis, L>R. 9/27/2012    Ulcer     Vitamin D deficiency disease        Past Surgical History:   Procedure Laterality Date    BLADDER SUSPENSION      CATARACT EXTRACTION  11/18/13    left eye    CERVICAL LAMINECTOMY      x3, fusion x1    COLONOSCOPY  2009    DECLOTTING OF ARTERIOVENOUS GRAFT Left 1/3/2022    Procedure: [id number];  Surgeon: Lindsey Louie MD;  Location: [facility] OR;  Service: Vascular;  Laterality: Left;    DECLOTTING OF ARTERIOVENOUS GRAFT Left 2/8/2022    Procedure: [id number];  Surgeon: Lindsey Louie MD;  Location: [facility] OR;  Service: Vascular;  Laterality: Left;    FISTULOGRAM N/A 2/27/2020    Procedure: Fistulogram;  Surgeon: Noe Benitez MD;  Location: [facility] CATH LAB/EP;  Service: Cardiology;  Laterality: N/A;    HYSTERECTOMY      JOINT REPLACEMENT  2001    total right knee     LUMBAR LAMINECTOMY      x 3, fusion x1    OOPHORECTOMY      PERIPHERAL ANGIOGRAPHY N/A 3/9/2020    Procedure: Peripheral angiography;  Surgeon: Jacinto Henriquez MD;  Location: [facility] CATH LAB/EP;  Service: Cardiology;  Laterality: N/A;    PLACEMENT OF ARTERIOVENOUS GRAFT Left  1/21/2020    Procedure: INSERTION, GRAFT, ARTERIOVENOUS;  Surgeon: Lindsey Louie MD;  Location: [facility] OR;  Service: General;  Laterality: Left;    THROMBECTOMY Left 2/3/2020    Procedure: THROMBECTOMY;  Surgeon: Lindsey Louie MD;  Location: [facility] OR;  Service: General;  Laterality: Left;    THROMBECTOMY  3/9/2020    Procedure: Thrombectomy;  Surgeon: Jacinto Henriquez MD;  Location: [facility] CATH LAB/EP;  Service: Cardiology;;       Time Tracking:     OT Date of Treatment: 04/06/22  OT Start Time: 1405  OT Stop Time: 1422  OT Total Time (min): 17 min    Billable Minutes:Evaluation 8 co eval with PT    4/6/2022

## 2022-04-06 NOTE — SUBJECTIVE & OBJECTIVE
Interval History:  Awake and alert on 1 L nasal cannula, no reported event-continue nebs and switch steroid to p.o.  Blood pressure down trending  ESRD- nephrology on board    Review of Systems   Constitutional:  Negative for chills, diaphoresis and fever.   HENT:  Negative for hearing loss and sore throat.    Respiratory:  Positive for cough and shortness of breath.    Cardiovascular:  Negative for chest pain and leg swelling.   Gastrointestinal:  Negative for abdominal pain, diarrhea, nausea and vomiting.   Genitourinary:  Negative for difficulty urinating and flank pain.   Musculoskeletal:  Negative for back pain.   Skin:  Negative for rash and wound.   Neurological:  Negative for dizziness, weakness and headaches.   Psychiatric/Behavioral:  Negative for agitation and confusion.    Objective:     Vital Signs (Most Recent):  Temp: 97.2 °F (36.2 °C) (04/06/22 0724)  Pulse: 70 (04/06/22 0724)  Resp: 19 (04/06/22 0724)  BP: (!) 96/56 (04/06/22 0724)  SpO2: (!) 91 % (04/06/22 0724)   Vital Signs (24h Range):  Temp:  [96.6 °F (35.9 °C)-97.8 °F (36.6 °C)] 97.2 °F (36.2 °C)  Pulse:  [] 70  Resp:  [16-43] 19  SpO2:  [91 %-100 %] 91 %  BP: ()/(53-65) 96/56     Weight: 38.9 kg (85 lb 12.1 oz)  Body mass index is 15.69 kg/m².    Intake/Output Summary (Last 24 hours) at 4/6/2022 0732  Last data filed at 4/5/2022 1915  Gross per 24 hour   Intake --   Output 1700 ml   Net -1700 ml      Physical Exam  Vitals and nursing note reviewed.   Constitutional:       General: She is not in acute distress.     Appearance: Normal appearance. She is not ill-appearing.      Interventions: Nasal cannula in place.      Comments: 2L   HENT:      Head: Normocephalic and atraumatic.   Cardiovascular:      Rate and Rhythm: Normal rate and regular rhythm.      Pulses: Normal pulses.      Heart sounds: Normal heart sounds. No murmur heard.    No friction rub. No gallop.      Arteriovenous access: Left arteriovenous access is  present.  Pulmonary:      Effort: Pulmonary effort is normal. No respiratory distress.      Breath sounds: Examination of the right-lower field reveals decreased breath sounds. Examination of the left-lower field reveals decreased breath sounds. Decreased breath sounds present. No wheezing or rhonchi.   Abdominal:      General: Bowel sounds are normal. There is no distension.      Palpations: Abdomen is soft.      Tenderness: There is no abdominal tenderness. There is no guarding.   Musculoskeletal:         General: No swelling.      Cervical back: Normal range of motion and neck supple.      Right lower leg: No edema.      Left lower leg: No edema.   Skin:     General: Skin is warm and dry.      Capillary Refill: Capillary refill takes less than 2 seconds.      Findings: No bruising, erythema or rash.   Neurological:      General: No focal deficit present.      Mental Status: She is alert and oriented to person, place, and time.      GCS: GCS eye subscore is 4. GCS verbal subscore is 5. GCS motor subscore is 6.   Psychiatric:         Mood and Affect: Mood normal.         Behavior: Behavior normal. Behavior is cooperative.       Significant Labs: A1C: No results for input(s): HGBA1C in the last 4320 hours.  ABGs:   Recent Labs   Lab 04/05/22  1542   PH 7.441   PCO2 37.9   HCO3 25.8   POCSATURATED 81*   BE 2   PO2 43*     Blood Culture: No results for input(s): LABBLOO in the last 48 hours.  CBC:   Recent Labs   Lab 04/05/22  1323 04/06/22  0554   WBC 11.24 8.48   HGB 12.3 11.8*   HCT 40.1 38.1    171     CMP:   Recent Labs   Lab 04/05/22  1323 04/06/22  0554    133*  133*   K 3.9 4.8  4.8   CL 99 101  101   CO2 26 22*  22*   GLU 97 149*  149*   BUN 28* 22  22   CREATININE 2.4* 1.7*  1.7*   CALCIUM 9.6 9.4  9.4   PROT 8.4 8.0   ALBUMIN 3.6 3.5   BILITOT 0.8 0.8   ALKPHOS 374* 323*   AST 16 17   ALT 8* 7*   ANIONGAP 13 10  10   EGFRNONAA 19* 28*  28*     Lactic Acid: No results for input(s):  LACTATE in the last 48 hours.  Lipase: No results for input(s): LIPASE in the last 48 hours.  Lipid Panel: No results for input(s): CHOL, HDL, LDLCALC, TRIG, CHOLHDL in the last 48 hours.  Magnesium:   Recent Labs   Lab 04/05/22  1323 04/06/22  0554   MG 2.0 2.1  2.1     Troponin:   Recent Labs   Lab 04/05/22  1323   TROPONINI 0.011     TSH: No results for input(s): TSH in the last 4320 hours.  Urine Culture: No results for input(s): LABURIN in the last 48 hours.  Urine Studies: No results for input(s): COLORU, APPEARANCEUA, PHUR, SPECGRAV, PROTEINUA, GLUCUA, KETONESU, BILIRUBINUA, OCCULTUA, NITRITE, UROBILINOGEN, LEUKOCYTESUR, RBCUA, WBCUA, BACTERIA, SQUAMEPITHEL, HYALINECASTS in the last 48 hours.    Invalid input(s): WRIGHTSUR    Significant Imaging: I have reviewed all pertinent imaging results/findings within the past 24 hours.

## 2022-04-06 NOTE — H&P
[facility]  History & Physical    Patient Name: Katie Quevedo  MRN: [id number]  Patient Class: IP- Inpatient  Admission Date: 4/5/2022  Attending Physician: Gulshan Goins MD   Primary Care Provider: Kingston Verduzco MD         Patient information was obtained from patient and ER records.     Subjective:     Principal Problem:Acute on chronic respiratory failure with hypoxia    Chief Complaint:   Chief Complaint   Patient presents with    Shortness of Breath     Pt at dialysis , hasnt dialyzed since last week, sob for couple days, sats in 70's when ejems arrived . Pt 98% on arrival to er, denies chest pain. No dialysis received.        HPI: Katie Quevedo is 67 y/o female with PMH of COPD, ESRD on HD, hypertension, AVF clot, and CHF presents with shortness of breath. Patient reports her shortness of breath started last night. Patient states it was sudden, was not relieved by her albuterol inhaler. Patient went to dialysis this morning and was accessed but did not receive any dialysis. Per EMS, the patient's SpO2 was in the 70s on their arrival.  The patient received solu-medrol x1, but did not receive any breathing treatments. Patient reports productive cough with yellow sputum production. Patient denies chest pain, headache, dizziness, lightheadedness, abdominal pain, nausea, vomiting, dysuria, hematuria, diarrhea, constipation, black/bloody stools. She reports daily use of her inhalers.  She reports multiple ED visits for breathing issues. History of tobacco use but denies current use of tobacco. Patient will be placed into hospital medicine observation services under my care in collaboration with Dr. Gulshan Goins.         Past Medical History:   Diagnosis Date    Acute congestive heart failure 02/10/2020    Anemia     Bilateral renal cysts     Cataract     Chronic LBP 7/26/2012    Chronic pain     CKD (chronic kidney disease), stage IV     Colon polyp 2013    COPD (chronic obstructive  pulmonary disease)     Dehydration     Encounter for blood transfusion     HTN (hypertension)     Lumbar spondylosis     Melanoma     of the lip    Metabolic bone disease     Migraines, neuralgic     Osteoporosis     Primary osteoarthritis of both knees     s/p Rt TKA    Pulmonary embolism with infarction     Seizures 1972    x1 only    Subdeltoid bursitis, L>R. 9/27/2012    Ulcer     Vitamin D deficiency disease        Past Surgical History:   Procedure Laterality Date    BLADDER SUSPENSION      CATARACT EXTRACTION  11/18/13    left eye    CERVICAL LAMINECTOMY      x3, fusion x1    COLONOSCOPY  2009    DECLOTTING OF ARTERIOVENOUS GRAFT Left 1/3/2022    Procedure: [id number];  Surgeon: Lindsey Louie MD;  Location: [facility] OR;  Service: Vascular;  Laterality: Left;    DECLOTTING OF ARTERIOVENOUS GRAFT Left 2/8/2022    Procedure: [id number];  Surgeon: Lindsey Louie MD;  Location: [facility] OR;  Service: Vascular;  Laterality: Left;    FISTULOGRAM N/A 2/27/2020    Procedure: Fistulogram;  Surgeon: Noe Benitez MD;  Location: [facility] CATH LAB/EP;  Service: Cardiology;  Laterality: N/A;    HYSTERECTOMY      JOINT REPLACEMENT  2001    total right knee     LUMBAR LAMINECTOMY      x 3, fusion x1    OOPHORECTOMY      PERIPHERAL ANGIOGRAPHY N/A 3/9/2020    Procedure: Peripheral angiography;  Surgeon: Jacinto Henriquez MD;  Location: [facility] CATH LAB/EP;  Service: Cardiology;  Laterality: N/A;    PLACEMENT OF ARTERIOVENOUS GRAFT Left 1/21/2020    Procedure: INSERTION, GRAFT, ARTERIOVENOUS;  Surgeon: Lindsey Louie MD;  Location: [facility] OR;  Service: General;  Laterality: Left;    THROMBECTOMY Left 2/3/2020    Procedure: THROMBECTOMY;  Surgeon: Lindsey Louie MD;  Location: [facility] OR;  Service: General;  Laterality: Left;    THROMBECTOMY  3/9/2020    Procedure: Thrombectomy;  Surgeon: Jacinto Henriquez MD;  Location: [facility] CATH LAB/EP;  Service: Cardiology;;       Review  of patient's allergies indicates:   Allergen Reactions    Aspirin      Other reaction(s): hx of ulcers    Tetracycline Swelling     Other reaction(s): Swelling    Penicillins Rash     Other reaction(s): Hives  Other reaction(s): Rash  Other reaction(s): Rash  Other reaction(s): Hives       No current facility-administered medications on file prior to encounter.     Current Outpatient Medications on File Prior to Encounter   Medication Sig    acetaminophen (TYLENOL) 325 MG tablet Take 1 tablet (325 mg total) by mouth every 6 (six) hours as needed for Pain.    albuterol (VENTOLIN HFA) 90 mcg/actuation inhaler Inhale 2 puffs into the lungs every 6 (six) hours as needed for Wheezing or Shortness of Breath. Rescue    albuterol-ipratropium (DUO-NEB) 2.5 mg-0.5 mg/3 mL nebulizer solution Take 3 mLs by nebulization every 6 (six) hours as needed for Shortness of Breath. Rescue    B complex-vitamin C-folic acid (LINDA-RADHA) 0.8 mg Tab Take 1 tablet (0.8 mg total) by mouth once daily.    clonazePAM (KLONOPIN) 1 MG tablet TAKE 1 TABLET BY MOUTH 3 TIMES WEEKLY ON DIALYSIS DAYS    diclofenac sodium (VOLTAREN) 1 % Gel Apply 2 g topically 3 (three) times daily as needed (apply).    fluticasone-umeclidin-vilanter (TRELEGY ELLIPTA) 200-62.5-25 mcg inhaler Inhale 1 puff into the lungs once daily.    HYDROcodone-acetaminophen (NORCO)  mg per tablet Take 1 tablet by mouth 3 (three) times daily as needed for Pain.    mirtazapine (REMERON) 15 MG tablet Take 1 tablet (15 mg total) by mouth every evening.    sevelamer carbonate (RENVELA) 800 mg Tab Take 1 tablet (800 mg total) by mouth after meals as needed.    allopurinoL (ZYLOPRIM) 100 MG tablet Take 1 tablet (100 mg total) by mouth on Tuesday, Thursday, Saturday, and Sunday.    busPIRone (BUSPAR) 10 MG tablet Take 10 mg by mouth once daily.    diphenhydrAMINE (BENADRYL) 25 mg capsule Take 25 mg by mouth every 6 (six) hours as needed for Itching.    epoetin hemant-epbx  (RETACRIT) 10,000 unit/mL imjection Inject 0.5 mLs (5,000 Units total) into the skin every Mon, Wed, Fri.    levoFLOXacin (LEVAQUIN) 500 MG tablet Take every other day after returning home from dialysis.    lidocaine-prilocaine (EMLA) cream Apply topically to left arm prior to dialysis.    zolpidem (AMBIEN) 5 MG Tab Take 5 mg by mouth nightly.     Family History       Problem Relation (Age of Onset)    Arthritis Mother    Cancer Father    Cataracts Father, Sister    Diabetes Maternal Aunt    Glaucoma Cousin    Heart attack Maternal Grandfather    Heart disease Maternal Grandfather    Hypertension Father, Maternal Grandfather    Stroke Mother          Tobacco Use    Smoking status: Former Smoker     Types: Cigarettes    Smokeless tobacco: Former User     Quit date: 2/3/2015   Substance and Sexual Activity    Alcohol use: Not Currently     Comment: Rare    Drug use: No    Sexual activity: Never     Partners: Male     Review of Systems   Constitutional:  Negative for chills, diaphoresis and fever.   HENT:  Negative for hearing loss and sore throat.    Eyes:  Negative for visual disturbance.   Respiratory:  Positive for cough and shortness of breath.    Cardiovascular:  Negative for chest pain and leg swelling.   Gastrointestinal:  Negative for abdominal pain, diarrhea, nausea and vomiting.   Genitourinary:  Negative for difficulty urinating and flank pain.   Musculoskeletal:  Negative for back pain.   Skin:  Negative for rash and wound.   Neurological:  Negative for dizziness, weakness and headaches.   Psychiatric/Behavioral:  Negative for agitation and confusion.    Objective:     Vital Signs (Most Recent):  Temp: 97.6 °F (36.4 °C) (04/05/22 2336)  Pulse: 85 (04/06/22 0023)  Resp: 20 (04/05/22 2336)  BP: (!) 111/56 (04/05/22 2336)  SpO2: 96 % (04/06/22 0023)   Vital Signs (24h Range):  Temp:  [97.3 °F (36.3 °C)-97.8 °F (36.6 °C)] 97.6 °F (36.4 °C)  Pulse:  [] 85  Resp:  [18-43] 20  SpO2:  [94 %-100 %] 96  %  BP: ()/(53-65) 111/56     Weight: 40.8 kg (90 lb)  Body mass index is 16.46 kg/m².    Physical Exam  Vitals and nursing note reviewed.   Constitutional:       General: She is not in acute distress.     Appearance: Normal appearance. She is not ill-appearing.      Interventions: Nasal cannula in place.      Comments: 2L   HENT:      Head: Normocephalic and atraumatic.      Mouth/Throat:      Mouth: Mucous membranes are moist.   Eyes:      Extraocular Movements: Extraocular movements intact.      Pupils: Pupils are equal, round, and reactive to light.   Cardiovascular:      Rate and Rhythm: Normal rate and regular rhythm.      Pulses: Normal pulses.      Heart sounds: Normal heart sounds. No murmur heard.    No friction rub. No gallop.      Arteriovenous access: Left arteriovenous access is present.  Pulmonary:      Effort: Pulmonary effort is normal. No respiratory distress.      Breath sounds: Examination of the right-lower field reveals decreased breath sounds. Examination of the left-lower field reveals decreased breath sounds. Decreased breath sounds present. No wheezing or rhonchi.   Abdominal:      General: Bowel sounds are normal. There is no distension.      Palpations: Abdomen is soft.      Tenderness: There is no abdominal tenderness. There is no guarding.   Musculoskeletal:         General: No swelling.      Cervical back: Normal range of motion and neck supple.      Right lower leg: No edema.      Left lower leg: No edema.   Skin:     General: Skin is warm and dry.      Capillary Refill: Capillary refill takes less than 2 seconds.      Findings: No bruising, erythema or rash.   Neurological:      General: No focal deficit present.      Mental Status: She is alert and oriented to person, place, and time.      GCS: GCS eye subscore is 4. GCS verbal subscore is 5. GCS motor subscore is 6.   Psychiatric:         Mood and Affect: Mood normal.         Behavior: Behavior normal. Behavior is cooperative.          CRANIAL NERVES     CN III, IV, VI   Pupils are equal, round, and reactive to light.     Significant Labs: All pertinent labs within the past 24 hours have been reviewed.  Recent Lab Results         04/05/22  2331   04/05/22  1542   04/05/22  1454   04/05/22  1323        Albumin       3.6       Alkaline Phosphatase       374       Allens Test   Pass           ALT       8       Anion Gap       13       AST       16       Baso #       0.13       Basophil %       1.2       BILIRUBIN TOTAL       0.8  Comment: For infants and newborns, interpretation of results should be based  on gestational age, weight and in agreement with clinical  observations.    Premature Infant recommended reference ranges:  Up to 24 hours.............<8.0 mg/dL  Up to 48 hours............<12.0 mg/dL  3-5 days..................<15.0 mg/dL  6-29 days.................<15.0 mg/dL         BNP       440  Comment: Values of less than 100 pg/ml are consistent with non-CHF populations.       Site   RR           BUN       28       Calcium       9.6       Chloride       99       CO2       26       Creatinine       2.4       DelSys   CPAP/BiPAP           Differential Method       Automated       eGFR if        22       eGFR if non        19  Comment: Calculation used to obtain the estimated glomerular filtration  rate (eGFR) is the CKD-EPI equation.          Eos #       0.7       Eosinophil %       6.3       EP   8           FiO2   30           Glucose       97       Gran # (ANC)       8.2       Gran %       72.6       Hematocrit       40.1       Hemoglobin       12.3       Immature Grans (Abs)       0.04  Comment: Mild elevation in immature granulocytes is non specific and   can be seen in a variety of conditions including stress response,   acute inflammation, trauma and pregnancy. Correlation with other   laboratory and clinical findings is essential.         Immature Granulocytes       0.4       IP   15            Lymph #       1.1       Lymph %       9.7       Magnesium       2.0       MCH       30.7       MCHC       30.7       MCV       100       Mode   BiPAP           Mono #       1.1       Mono %       9.8       MPV       9.6       nRBC       0       Platelets       218       POC BE   2           POC HCO3   25.8           POC PCO2   37.9           POC PH   7.441           POC PO2   43           POC SATURATED O2   81           POC TCO2   27           POCT Glucose 211             Potassium       3.9       PROTEIN TOTAL       8.4        Acceptable     Yes         RBC       4.01       RDW       13.5       Sample   ARTERIAL           SARS-CoV-2 RNA, Amplification, Qual     Negative         Sodium       138       Troponin I       0.011  Comment: The reference interval for Troponin I represents the 99th percentile   cutoff   for our facility and is consistent with 3rd generation assay   performance.         WBC       11.24               Significant Imaging: I have reviewed all pertinent imaging results/findings within the past 24 hours.    Assessment/Plan:     * Acute on chronic respiratory failure with hypoxia  -Patient with Hypoxic respiratory failure which is Acute on Chronic   -she is on home oxygen at 2 LPM.   -Supplemental oxygen was provided and noted- Nasal Cannula 1 LPM  -Signs/symptoms of respiratory failure include- tachypnea, increased work of breathing, respiratory distress and use of accessory muscles.   -Contributing diagnoses includes - COPD and Pleural effusion  -Labs and images were reviewed. Patient Has recent ABG, which has been reviewed.  -Continue supplemental oxygen; titrate and wean to maintain SpO2 >88%  -Duo-nebs PRN for SOB/Wheezing  -ABG PRN         ESRD (end stage renal disease) on dialysis  -HD MWF; TIGIST fistula + thrill  -Nephrology consulted for HD management        COPD (chronic obstructive pulmonary disease)  Chronic respiratory failure with hypoxia, on home O2  therapy    Clinically-stable.  Afebrile. No Elevation of WBC, Productive Cough.  --Continue Solumedrol 125 mg IV every 12 hours and taper when moving air better  --Will continue Breo   --DuoNeb breathing Treatments every 4 hours while awake  --PRN Oxygen per Nasal Cannula for SpO2 <88%  --Pulse-Ox Monitoring every 4 hours  --Monitor respiratory status closely      Chronic respiratory failure with hypoxia, on home O2 therapy  See above      VTE Risk Mitigation (From admission, onward)         Ordered     heparin (porcine) injection 5,000 Units  Every 8 hours         04/05/22 1507     IP VTE HIGH RISK PATIENT  Once         04/05/22 1507     Place sequential compression device  Until discontinued         04/05/22 1507                 Brisa Kearns, ALBINO, ACNPC-AG, CCRN  Hospitalist  Department of Hospital Medicine  [facility]   [phone]

## 2022-04-06 NOTE — PLAN OF CARE
Problem: Adult Inpatient Plan of Care  Goal: Plan of Care Review  4/6/2022 0744 by Jessenia Neil LPN  Outcome: Ongoing, Progressing  4/6/2022 0742 by Jessenia Neil LPN  Outcome: Ongoing, Progressing   POC reviewed with patient.  Patient centered care maintained. Pain assessment performed. Pain managed with PRN pain medications. Patient SR on telemetry. O2@ 1L/nc tolerating no shortness of breath noted. Status post HD TIGIST shunt positive for thrill and bruit. Safety precautions initiated and maintained. Bed locked and in lowest position. Side rails up X 2. Call light and bedside table in reach.

## 2022-04-07 ENCOUNTER — PATIENT OUTREACH (OUTPATIENT)
Dept: ADMINISTRATIVE | Facility: OTHER | Age: 77
End: 2022-04-07
Payer: MEDICARE

## 2022-04-07 ENCOUNTER — ANESTHESIA EVENT (OUTPATIENT)
Dept: SURGERY | Facility: HOSPITAL | Age: 77
DRG: 252 | End: 2022-04-07
Payer: MEDICARE

## 2022-04-07 ENCOUNTER — ANESTHESIA (OUTPATIENT)
Dept: SURGERY | Facility: HOSPITAL | Age: 77
DRG: 252 | End: 2022-04-07
Payer: MEDICARE

## 2022-04-07 LAB
ALBUMIN SERPL BCP-MCNC: 3.2 G/DL (ref 3.5–5.2)
ALP SERPL-CCNC: 265 U/L (ref 55–135)
ALT SERPL W/O P-5'-P-CCNC: 10 U/L (ref 10–44)
ANION GAP SERPL CALC-SCNC: 11 MMOL/L (ref 8–16)
AST SERPL-CCNC: 13 U/L (ref 10–40)
BILIRUB SERPL-MCNC: 0.5 MG/DL (ref 0.1–1)
BUN SERPL-MCNC: 63 MG/DL (ref 8–23)
CALCIUM SERPL-MCNC: 9.4 MG/DL (ref 8.7–10.5)
CHLORIDE SERPL-SCNC: 99 MMOL/L (ref 95–110)
CO2 SERPL-SCNC: 23 MMOL/L (ref 23–29)
CREAT SERPL-MCNC: 3 MG/DL (ref 0.5–1.4)
EST. GFR  (AFRICAN AMERICAN): 17 ML/MIN/1.73 M^2
EST. GFR  (NON AFRICAN AMERICAN): 14 ML/MIN/1.73 M^2
GLUCOSE SERPL-MCNC: 108 MG/DL (ref 70–110)
HBV CORE AB SERPL QL IA: NEGATIVE
HBV SURFACE AG SERPL QL IA: NEGATIVE
POCT GLUCOSE: 120 MG/DL (ref 70–110)
POTASSIUM SERPL-SCNC: 5.2 MMOL/L (ref 3.5–5.1)
PROT SERPL-MCNC: 7.4 G/DL (ref 6–8.4)
SODIUM SERPL-SCNC: 133 MMOL/L (ref 136–145)

## 2022-04-07 PROCEDURE — 37000009 HC ANESTHESIA EA ADD 15 MINS: Performed by: SURGERY

## 2022-04-07 PROCEDURE — 36415 COLL VENOUS BLD VENIPUNCTURE: CPT | Performed by: STUDENT IN AN ORGANIZED HEALTH CARE EDUCATION/TRAINING PROGRAM

## 2022-04-07 PROCEDURE — 36000707: Performed by: SURGERY

## 2022-04-07 PROCEDURE — 63600175 PHARM REV CODE 636 W HCPCS

## 2022-04-07 PROCEDURE — 25000003 PHARM REV CODE 250

## 2022-04-07 PROCEDURE — 63600175 PHARM REV CODE 636 W HCPCS: Performed by: NURSE ANESTHETIST, CERTIFIED REGISTERED

## 2022-04-07 PROCEDURE — 11000001 HC ACUTE MED/SURG PRIVATE ROOM

## 2022-04-07 PROCEDURE — C1757 CATH, THROMBECTOMY/EMBOLECT: HCPCS | Performed by: SURGERY

## 2022-04-07 PROCEDURE — 25000003 PHARM REV CODE 250: Performed by: SURGERY

## 2022-04-07 PROCEDURE — 99900035 HC TECH TIME PER 15 MIN (STAT)

## 2022-04-07 PROCEDURE — 37000008 HC ANESTHESIA 1ST 15 MINUTES: Performed by: SURGERY

## 2022-04-07 PROCEDURE — 94761 N-INVAS EAR/PLS OXIMETRY MLT: CPT

## 2022-04-07 PROCEDURE — 94640 AIRWAY INHALATION TREATMENT: CPT

## 2022-04-07 PROCEDURE — 71000033 HC RECOVERY, INTIAL HOUR: Performed by: SURGERY

## 2022-04-07 PROCEDURE — 27000221 HC OXYGEN, UP TO 24 HOURS

## 2022-04-07 PROCEDURE — 63600175 PHARM REV CODE 636 W HCPCS: Performed by: SURGERY

## 2022-04-07 PROCEDURE — 80053 COMPREHEN METABOLIC PANEL: CPT | Performed by: STUDENT IN AN ORGANIZED HEALTH CARE EDUCATION/TRAINING PROGRAM

## 2022-04-07 PROCEDURE — 36000706: Performed by: SURGERY

## 2022-04-07 PROCEDURE — 25000003 PHARM REV CODE 250: Performed by: NURSE ANESTHETIST, CERTIFIED REGISTERED

## 2022-04-07 PROCEDURE — 25000242 PHARM REV CODE 250 ALT 637 W/ HCPCS

## 2022-04-07 RX ORDER — PHENYLEPHRINE HYDROCHLORIDE 10 MG/ML
INJECTION INTRAVENOUS
Status: DISCONTINUED | OUTPATIENT
Start: 2022-04-07 | End: 2022-04-07

## 2022-04-07 RX ORDER — HYDROMORPHONE HYDROCHLORIDE 2 MG/ML
0.5 INJECTION, SOLUTION INTRAMUSCULAR; INTRAVENOUS; SUBCUTANEOUS EVERY 5 MIN PRN
Status: DISCONTINUED | OUTPATIENT
Start: 2022-04-07 | End: 2022-04-10 | Stop reason: HOSPADM

## 2022-04-07 RX ORDER — MUPIROCIN 20 MG/G
OINTMENT TOPICAL
Status: DISCONTINUED | OUTPATIENT
Start: 2022-04-07 | End: 2022-04-08

## 2022-04-07 RX ORDER — ONDANSETRON 2 MG/ML
4 INJECTION INTRAMUSCULAR; INTRAVENOUS DAILY PRN
Status: DISCONTINUED | OUTPATIENT
Start: 2022-04-07 | End: 2022-04-10 | Stop reason: HOSPADM

## 2022-04-07 RX ORDER — HEPARIN SODIUM 1000 [USP'U]/ML
INJECTION, SOLUTION INTRAVENOUS; SUBCUTANEOUS
Status: DISCONTINUED | OUTPATIENT
Start: 2022-04-07 | End: 2022-04-07

## 2022-04-07 RX ORDER — PREDNISONE 20 MG/1
40 TABLET ORAL DAILY
Qty: 8 TABLET | Refills: 0 | Status: SHIPPED | OUTPATIENT
Start: 2022-04-07 | End: 2022-04-12

## 2022-04-07 RX ORDER — OXYCODONE HYDROCHLORIDE 5 MG/1
5 TABLET ORAL
Status: DISCONTINUED | OUTPATIENT
Start: 2022-04-07 | End: 2022-04-10 | Stop reason: HOSPADM

## 2022-04-07 RX ORDER — VANCOMYCIN HYDROCHLORIDE 1 G/20ML
INJECTION, POWDER, LYOPHILIZED, FOR SOLUTION INTRAVENOUS
Status: DISCONTINUED | OUTPATIENT
Start: 2022-04-07 | End: 2022-04-07 | Stop reason: HOSPADM

## 2022-04-07 RX ORDER — MIDAZOLAM HYDROCHLORIDE 1 MG/ML
INJECTION, SOLUTION INTRAMUSCULAR; INTRAVENOUS
Status: DISCONTINUED | OUTPATIENT
Start: 2022-04-07 | End: 2022-04-07

## 2022-04-07 RX ORDER — PROPOFOL 10 MG/ML
VIAL (ML) INTRAVENOUS CONTINUOUS PRN
Status: DISCONTINUED | OUTPATIENT
Start: 2022-04-07 | End: 2022-04-07

## 2022-04-07 RX ORDER — CLINDAMYCIN PHOSPHATE 900 MG/50ML
INJECTION, SOLUTION INTRAVENOUS
Status: DISCONTINUED | OUTPATIENT
Start: 2022-04-07 | End: 2022-04-07

## 2022-04-07 RX ORDER — SODIUM CHLORIDE 0.9 % (FLUSH) 0.9 %
3 SYRINGE (ML) INJECTION
Status: DISCONTINUED | OUTPATIENT
Start: 2022-04-07 | End: 2022-04-10 | Stop reason: HOSPADM

## 2022-04-07 RX ORDER — LIDOCAINE HYDROCHLORIDE 10 MG/ML
INJECTION, SOLUTION EPIDURAL; INFILTRATION; INTRACAUDAL; PERINEURAL
Status: DISCONTINUED | OUTPATIENT
Start: 2022-04-07 | End: 2022-04-07 | Stop reason: HOSPADM

## 2022-04-07 RX ORDER — PROCHLORPERAZINE EDISYLATE 5 MG/ML
5 INJECTION INTRAMUSCULAR; INTRAVENOUS EVERY 30 MIN PRN
Status: DISCONTINUED | OUTPATIENT
Start: 2022-04-07 | End: 2022-04-10 | Stop reason: HOSPADM

## 2022-04-07 RX ORDER — LIDOCAINE HYDROCHLORIDE 20 MG/ML
INJECTION INTRAVENOUS
Status: DISCONTINUED | OUTPATIENT
Start: 2022-04-07 | End: 2022-04-07

## 2022-04-07 RX ORDER — PROPOFOL 10 MG/ML
VIAL (ML) INTRAVENOUS
Status: DISCONTINUED | OUTPATIENT
Start: 2022-04-07 | End: 2022-04-07

## 2022-04-07 RX ORDER — HYDROMORPHONE HYDROCHLORIDE 2 MG/ML
0.2 INJECTION, SOLUTION INTRAMUSCULAR; INTRAVENOUS; SUBCUTANEOUS EVERY 5 MIN PRN
Status: DISCONTINUED | OUTPATIENT
Start: 2022-04-07 | End: 2022-04-10 | Stop reason: HOSPADM

## 2022-04-07 RX ADMIN — MELATONIN TAB 3 MG 6 MG: 3 TAB at 09:04

## 2022-04-07 RX ADMIN — PHENYLEPHRINE HYDROCHLORIDE 100 MCG: 10 INJECTION INTRAVENOUS at 01:04

## 2022-04-07 RX ADMIN — SEVELAMER CARBONATE 800 MG: 800 TABLET, FILM COATED ORAL at 05:04

## 2022-04-07 RX ADMIN — HEPARIN SODIUM 3000 UNITS: 1000 INJECTION, SOLUTION INTRAVENOUS; SUBCUTANEOUS at 01:04

## 2022-04-07 RX ADMIN — PROPOFOL 10 MG: 10 INJECTION, EMULSION INTRAVENOUS at 01:04

## 2022-04-07 RX ADMIN — PROPOFOL 20 MG: 10 INJECTION, EMULSION INTRAVENOUS at 01:04

## 2022-04-07 RX ADMIN — IPRATROPIUM BROMIDE AND ALBUTEROL SULFATE 3 ML: .5; 3 SOLUTION RESPIRATORY (INHALATION) at 03:04

## 2022-04-07 RX ADMIN — IPRATROPIUM BROMIDE AND ALBUTEROL SULFATE 3 ML: .5; 3 SOLUTION RESPIRATORY (INHALATION) at 08:04

## 2022-04-07 RX ADMIN — MIDAZOLAM 0.5 MG: 1 INJECTION INTRAMUSCULAR; INTRAVENOUS at 01:04

## 2022-04-07 RX ADMIN — IPRATROPIUM BROMIDE AND ALBUTEROL SULFATE 3 ML: .5; 3 SOLUTION RESPIRATORY (INHALATION) at 11:04

## 2022-04-07 RX ADMIN — HYDROCODONE BITARTRATE AND ACETAMINOPHEN 1 TABLET: 10; 325 TABLET ORAL at 05:04

## 2022-04-07 RX ADMIN — MIDAZOLAM 0.5 MG: 1 INJECTION INTRAMUSCULAR; INTRAVENOUS at 12:04

## 2022-04-07 RX ADMIN — SODIUM CHLORIDE: 0.9 INJECTION, SOLUTION INTRAVENOUS at 12:04

## 2022-04-07 RX ADMIN — PROPOFOL 75 MCG/KG/MIN: 10 INJECTION, EMULSION INTRAVENOUS at 01:04

## 2022-04-07 RX ADMIN — CLINDAMYCIN IN 5 PERCENT DEXTROSE 600 MG: 18 INJECTION, SOLUTION INTRAVENOUS at 12:04

## 2022-04-07 RX ADMIN — LIDOCAINE HYDROCHLORIDE 100 MG: 20 INJECTION, SOLUTION INTRAVENOUS at 01:04

## 2022-04-07 RX ADMIN — MIRTAZAPINE 15 MG: 7.5 TABLET ORAL at 09:04

## 2022-04-07 RX ADMIN — FLUTICASONE FUROATE AND VILANTEROL TRIFENATATE 1 PUFF: 200; 25 POWDER RESPIRATORY (INHALATION) at 08:04

## 2022-04-07 RX ADMIN — HEPARIN SODIUM 5000 UNITS: 5000 INJECTION INTRAVENOUS; SUBCUTANEOUS at 09:04

## 2022-04-07 RX ADMIN — MUPIROCIN: 20 OINTMENT TOPICAL at 09:04

## 2022-04-07 NOTE — OR NURSING
Received patient in OR 3.  Had yellow tone cross necklace around neck in place.  Removed necklace and placed in sealed bag and handed of to PACU nurse.

## 2022-04-07 NOTE — PLAN OF CARE
Patient has met PACU discharge criteria, VSS, pain well controlled. Family updated by phone. Released from PACU by Dr. Pineda

## 2022-04-07 NOTE — PLAN OF CARE
Problem: Adult Inpatient Plan of Care  Goal: Plan of Care Review  Outcome: Ongoing, Progressing   POC reviewed with patient.  Patient centered care maintained. Pain assessment performed. Pain managed with PRN pain medications. Patient NPO for possible procedure. Patient SR on telemetry. Safety precautions initiated and maintained. Bed locked and in lowest position. Side rails up X 2, Call light and bedside table in reach.

## 2022-04-07 NOTE — ANESTHESIA POSTPROCEDURE EVALUATION
Anesthesia Post Evaluation    Patient: Katie Quevedo    Procedure(s) Performed: Procedure(s) (LRB):  THROMBECTOMY (Left)    Final Anesthesia Type: general      Patient location during evaluation: PACU  Patient participation: Yes- Able to Participate  Level of consciousness: awake and alert  Post-procedure vital signs: reviewed and stable  Pain management: adequate  Airway patency: patent  HELIO mitigation strategies: Multimodal analgesia  PONV status at discharge: No PONV  Anesthetic complications: no      Cardiovascular status: blood pressure returned to baseline and hemodynamically stable  Respiratory status: room air  Hydration status: euvolemic  Follow-up not needed.          Vitals Value Taken Time   BP 92/50 04/07/22 1414   Temp 36.7 °C (98 °F) 04/07/22 1410   Pulse 62 04/07/22 1415   Resp 35 04/07/22 1415   SpO2 96 % 04/07/22 1415   Vitals shown include unvalidated device data.      No case tracking events are documented in the log.      Pain/Nelson Score: Pain Rating Prior to Med Admin: 8 (4/6/2022  6:32 PM)

## 2022-04-07 NOTE — OP NOTE
[facility]  Brief Operative Note    SUMMARY     Surgery Date: 4/7/2022     Surgeon(s) and Role:     * Lindsey Louie MD - Primary    Assisting Surgeon: None    Pre-op Diagnosis:  Clotted dialysis access, initial encounter [T82.49XA]    Post-op Diagnosis:  Post-Op Diagnosis Codes:     * Clotted dialysis access, initial encounter [T82.49XA]    Procedure(s) (LRB):  THROMBECTOMY (Left)  Left upper arm grfat   Anesthesia: Local MAC    Operative Findings:   Operative Note       Surgery Date: 4/7/2022     Surgeon(s) and Role:     * Lindsey Louie MD - Primary    Pre-op Diagnosis:  Clotted dialysis access, initial encounter [T82.49XA]    Post-op Diagnosis: Post-Op Diagnosis Codes:     * Clotted dialysis access, initial encounter [T82.49XA]    Procedure(s) (LRB):  THROMBECTOMY (Left)    Anesthesia: Local MAC    Procedure in Detail/Findings:    After satisfactory IV sedation, the patient in supine   position, left upper arm and forearm was prepped and draped in normal sterile   manner using ChloraPrep.  A stockinette was applied.  A timeout was called and   extremity was confirmed.  The area at the takeoff of the AV fistula, left upper   arm was infiltrated using 1% Xylocaine solution.  Incision was carried down to   the deep subcutaneous tissues.  Subcutaneous bleeders clamped and bovied and   further taken down.  Short segment of graft was isolated.  Proximal and distal   control was obtained.  Graft incision was made.  Crystal catheter first induced   on the venous side.  Good backward flow was obtained.  The patient was   heparinized using 3000 units of heparin.  Crystal catheter was then introduced   on the arterial side and excellent forward flow was obtained.  The graft   incision was closed using running 5-0 Prolene suture.  The patient had good   bruit after completion of procedure.  Hemostasis satisfactorily maintained.    Wound was irrigated with antibiotic solution and then approximated using  3-0   Vicryl for subcutaneous tissue.  The skin was closed using running 4-0 nylon   suture.  Sterile gauze dressing was applied.  Instrument count, sponge count and   needle count was correct.  The patient tolerated it well.  Estimated blood loss   was 50 mL.  Specimen removed was thrombus.  No intraoperative complications.    Intraoperative finding is clotted left upper arm access.  The patient was sent   to the Recovery Room in stable condition.          Estimated Blood Loss:30 cc           Specimens (From admission, onward)    None        Implants: * No implants in log *           Disposition: PACU - hemodynamically stable.           Condition: Good    Attestation:  I performed the procedure.    Patient tolerated well and was sent to recovery in stable condition.

## 2022-04-07 NOTE — PLAN OF CARE
Patient on oxygen @ documented setting. Attempt to wean FiO2 per [facility] oxygen protocol. Patient instructed on benefits of therapy.    Patient given nebulized treatment. Patient instructed on proper breathing technique, along with benefits of therapy.   Metered dose inhaler given. Patient instructed on proper technique of device, along with benefits of therapy.

## 2022-04-07 NOTE — PT/OT/SLP PROGRESS
Occupational Therapy      Patient Name:  Katie Quevedo   MRN:  [id number]    Patient not seen today secondary to Other (Comment) (SOPHIA - OR) AM. Will follow-up as able.    1441- Pt SOPHIA in Phase II    4/7/2022

## 2022-04-07 NOTE — PLAN OF CARE
SW met with pt at bedside for final assessment. Pt stated that her  would be at hospital around 8:30am. SW requested approval for BSC and RW from Natacha, awaiting approval to colin. Pt stated that she will be getting surgery at 12. Pt likely to d/c after. Pt has f/u appts on avs. Pt's  Dre [phone] will be able to transport pt home today. Rounds completed on pt.  All questions addressed.  Bedside nurse to discuss d/c medications.  Discussed importance to attend all f/u appts and take medications as prescribed.  Verbalized understanding.    Bam Méndez, MSW  [phone]    Future Appointments   Date Time Provider Department Center   4/13/2022  1:30 PM Kingston Verduzco MD [facility]   4/19/2022 10:00 AM Pushpa Mcmahon MD [facility]        04/07/22 0801   Final Note   Assessment Type Final Discharge Note   Anticipated Discharge Disposition Home   What phone number can be called within the next 1-3 days to see how you are doing after discharge? [phone]   Hospital Resources/Appts/Education Provided Appointments scheduled and added to AVS   Post-Acute Status   Coverage [employer]   Discharge Delays (!) Personal Transportation

## 2022-04-07 NOTE — PROGRESS NOTES
Nephrology Progress Note       Consult Requested By: Jennifer Bowles*  Reason for Consult: ESRD     SUBJECTIVE:          Review of Systems   Constitutional: Negative for chills and fever.   HENT: Negative for congestion and sore throat.    Eyes: Negative for blurred vision, double vision and photophobia.   Respiratory: Negative for cough and shortness of breath.    Cardiovascular: Negative for chest pain, palpitations and leg swelling.   Gastrointestinal: Negative for abdominal pain, diarrhea, nausea and vomiting.   Genitourinary: Negative for dysuria and urgency.   Musculoskeletal: Negative for joint pain and myalgias.   Skin: Negative for itching and rash.   Neurological: Negative for dizziness, sensory change, weakness and headaches.   Endo/Heme/Allergies: Negative for polydipsia. Does not bruise/bleed easily.   Psychiatric/Behavioral: Negative for depression.       Past Medical History:   Diagnosis Date    Acute congestive heart failure 02/10/2020    Anemia     Bilateral renal cysts     Cataract     Chronic LBP 7/26/2012    Chronic pain     CKD (chronic kidney disease), stage IV     Colon polyp 2013    COPD (chronic obstructive pulmonary disease)     Dehydration     Encounter for blood transfusion     HTN (hypertension)     Lumbar spondylosis     Melanoma     of the lip    Metabolic bone disease     Migraines, neuralgic     Osteoporosis     Primary osteoarthritis of both knees     s/p Rt TKA    Pulmonary embolism with infarction     Seizures 1972    x1 only    Subdeltoid bursitis, L>R. 9/27/2012    Ulcer     Vitamin D deficiency disease      Past Surgical History:   Procedure Laterality Date    BLADDER SUSPENSION      CATARACT EXTRACTION  11/18/13    left eye    CERVICAL LAMINECTOMY      x3, fusion x1    COLONOSCOPY  2009    DECLOTTING OF ARTERIOVENOUS GRAFT Left 1/3/2022    Procedure: [id number];  Surgeon: Lindsey Louie MD;  Location: [facility] OR;  Service:  Vascular;  Laterality: Left;    DECLOTTING OF ARTERIOVENOUS GRAFT Left 2/8/2022    Procedure: [id number];  Surgeon: Lindsey Louie MD;  Location: [facility] OR;  Service: Vascular;  Laterality: Left;    FISTULOGRAM N/A 2/27/2020    Procedure: Fistulogram;  Surgeon: Noe Benitez MD;  Location: [facility] CATH LAB/EP;  Service: Cardiology;  Laterality: N/A;    HYSTERECTOMY      JOINT REPLACEMENT  2001    total right knee     LUMBAR LAMINECTOMY      x 3, fusion x1    OOPHORECTOMY      PERIPHERAL ANGIOGRAPHY N/A 3/9/2020    Procedure: Peripheral angiography;  Surgeon: Jacinto Henriquez MD;  Location: [facility] CATH LAB/EP;  Service: Cardiology;  Laterality: N/A;    PLACEMENT OF ARTERIOVENOUS GRAFT Left 1/21/2020    Procedure: INSERTION, GRAFT, ARTERIOVENOUS;  Surgeon: Lindsey Louie MD;  Location: [facility] OR;  Service: General;  Laterality: Left;    THROMBECTOMY Left 2/3/2020    Procedure: THROMBECTOMY;  Surgeon: Lindsey Louie MD;  Location: [facility] OR;  Service: General;  Laterality: Left;    THROMBECTOMY  3/9/2020    Procedure: Thrombectomy;  Surgeon: Jacinto Henriquez MD;  Location: [facility] CATH LAB/EP;  Service: Cardiology;;     Family History   Problem Relation Age of Onset    Arthritis Mother     Stroke Mother     Hypertension Father     Cancer Father     Cataracts Father     Diabetes Maternal Aunt     Hypertension Maternal Grandfather     Heart disease Maternal Grandfather     Heart attack Maternal Grandfather     Cataracts Sister     Glaucoma Cousin      Social History     Tobacco Use    Smoking status: Former Smoker     Types: Cigarettes    Smokeless tobacco: Former User     Quit date: 2/3/2015   Substance Use Topics    Alcohol use: Not Currently     Comment: Rare    Drug use: No       Review of patient's allergies indicates:   Allergen Reactions    Aspirin      Other reaction(s): hx of ulcers    Tetracycline Swelling     Other reaction(s): Swelling    Penicillins Rash     Other  reaction(s): Hives  Other reaction(s): Rash  Other reaction(s): Rash  Other reaction(s): Hives            OBJECTIVE:     Vital Signs (Most Recent)  Vitals:    04/07/22 0505 04/07/22 0728 04/07/22 0748 04/07/22 0803   BP: (!) 92/53 114/60     BP Location:  Right leg     Patient Position: Lying Lying     Pulse: 64 63 (!) 57 70   Resp: 16 19  18   Temp: 98.3 °F (36.8 °C) 98.7 °F (37.1 °C)     TempSrc: Oral Axillary     SpO2: 96% 99%  99%   Weight:       Height:                     Medications:          Physical Exam  Vitals and nursing note reviewed.   Constitutional:       General: She is not in acute distress.     Appearance: She is not diaphoretic.   HENT:      Head: Normocephalic and atraumatic.      Mouth/Throat:      Pharynx: No oropharyngeal exudate.   Eyes:      General: No scleral icterus.     Conjunctiva/sclera: Conjunctivae normal.      Pupils: Pupils are equal, round, and reactive to light.   Cardiovascular:      Rate and Rhythm: Normal rate and regular rhythm.      Heart sounds: Normal heart sounds. No murmur heard.  Pulmonary:      Effort: Pulmonary effort is normal. No respiratory distress.      Breath sounds: Wheezing present.   Abdominal:      General: Bowel sounds are normal. There is no distension.      Palpations: Abdomen is soft.      Tenderness: There is no abdominal tenderness.   Musculoskeletal:         General: Normal range of motion.      Cervical back: Normal range of motion and neck supple.      Comments: AVF no thrill    Skin:     General: Skin is warm and dry.      Findings: No erythema.   Neurological:      Mental Status: She is alert and oriented to person, place, and time.      Cranial Nerves: No cranial nerve deficit.   Psychiatric:         Mood and Affect: Affect normal.         Cognition and Memory: Memory normal.         Judgment: Judgment normal.         Laboratory:  Recent Labs   Lab 04/05/22  1323 04/06/22  0554   WBC 11.24 8.48   HGB 12.3 11.8*   HCT 40.1 38.1    171    MONO 9.8  1.1* 0.8*  0.1*     Recent Labs   Lab 04/05/22  1323 04/06/22  0554 04/07/22  0921    133*  133* 133*   K 3.9 4.8  4.8 5.2*   CL 99 101  101 99   CO2 26 22*  22* 23   BUN 28* 22  22 63*   CREATININE 2.4* 1.7*  1.7* 3.0*   CALCIUM 9.6 9.4  9.4 9.4   PHOS  --  3.1  --        Diagnostic Results:  X-Ray: Reviewed  US: Reviewed  Echo: Reviewed  ASSESSMENT/PLAN:     1. ESRD  - HD [facility] With Dr Aura Diggs   --   AVG clotted pending declot HD after 2hr then Friday can be done outpatient   -- Daily Renal Function Panel  -- Avoid Hypotension.  -- Renally dose all meds  2. HTN (I10) -  Controlled   3. Anemia of chronic kidney disease treated with BILL (N18.9 D63.1) -   EPogen  with   HD as needed    Recent Labs   Lab 04/05/22  1323 04/06/22  0554   HGB 12.3 11.8*   HCT 40.1 38.1    171       Iron   Lab Results   Component Value Date    IRON 13 (L) 02/12/2020    TIBC 462 (H) 02/12/2020    FERRITIN 148 07/17/2019       4. MBD (E88.9 M90.80) -  Recent Labs   Lab 04/06/22  0554 04/07/22  0921   CALCIUM 9.4  9.4 9.4   PHOS 3.1  --      Recent Labs   Lab 04/05/22  1323 04/06/22  0554   MG 2.0 2.1  2.1       Lab Results   Component Value Date    .0 (H) 12/28/2018    CALCIUM 9.4 04/07/2022    PHOS 3.1 04/06/2022     Lab Results   Component Value Date    [id number] 26 (L) 02/12/2020     Sevelamer   Lab Results   Component Value Date    CO2 23 04/07/2022       5. Nutrition/Hypoalbuminemia (E88.09) -   Recent Labs   Lab 04/06/22  0554 04/07/22  0921   ALBUMIN 3.5 3.2*     Nepro with meals TID. Renal vitamins daily      Thank you for the consult, will follow  With any question please call [phone]  Ramo Da Silva MD    [facility]  EDGARD Bustillos MD, NEIL SAMS MD,   MD CURTIS Benoit MD E. V. Harmon, NP    [address]  [place], [address]  [phone]

## 2022-04-07 NOTE — ANESTHESIA PREPROCEDURE EVALUATION
04/07/2022  Katie Quevedo is a 76 y.o., female PMH COPD (on 2L O2 at home and currently), ESRD on HD (MWF, last was 4/1 as patient became SOB before dialysis), HTN, and HFpEF.  H/o AVF clotting with multiple thrombectomies. Scheduled for L AVF thrombectomy.      Pre-op Assessment    I have reviewed the Patient Summary Reports.     I have reviewed the Nursing Notes. I have reviewed the NPO Status.   I have reviewed the Medications.     Review of Systems  Anesthesia Hx:  No problems with previous Anesthesia    Social:  Former Smoker, No Alcohol Use    Hematology/Oncology:         -- Anemia:   EENT/Dental:EENT/Dental Normal   Cardiovascular:   Exercise tolerance: poor Hypertension, well controlled CHF    Pulmonary:   COPD, severe Shortness of breath    Renal/:   Chronic Renal Disease, ESRD, Dialysis    Hepatic/GI:  Hepatic/GI Normal    Musculoskeletal:   Arthritis     Endocrine:  Endocrine Normal        Physical Exam  General: Alert and Oriented    Airway:  Mallampati: II   Mouth Opening: Normal  TM Distance: Normal  Tongue: Normal    Dental:  Intact    Chest/Lungs:  Decreased Breath Sounds: left and right      Past Medical History:   Diagnosis Date    Acute congestive heart failure 02/10/2020    Anemia     Bilateral renal cysts     Cataract     Chronic LBP 7/26/2012    Chronic pain     CKD (chronic kidney disease), stage IV     Colon polyp 2013    COPD (chronic obstructive pulmonary disease)     Dehydration     Encounter for blood transfusion     HTN (hypertension)     Lumbar spondylosis     Melanoma     of the lip    Metabolic bone disease     Migraines, neuralgic     Osteoporosis     Primary osteoarthritis of both knees     s/p Rt TKA    Pulmonary embolism with infarction     Seizures 1972    x1 only    Subdeltoid bursitis, L>R. 9/27/2012    Ulcer     Vitamin D deficiency disease       Patient Active Problem List   Diagnosis    Melanoma    Chronic pain    Vitamin deficiency    Cervical post-laminectomy syndrome    Lumbar postlaminectomy syndrome    Lumbosacral spondylosis without myelopathy    Isolated cervical dystonia    Primary osteoarthritis of both knees    Subdeltoid bursitis, L>R.    Other fragments of torsion dystonia    Nuclear sclerosis - Both Eyes    Rotator cuff tear, right    Biceps tendonitis    Osteoarthritis, hip, bilateral    Lumbar radiculopathy, BLE    Cervical radiculopathy, BUE    Osteoporosis    Iron deficiency anemia    Essential hypertension    Atrophy of left kidney    Kidney cysts    History of colon polyps    Acute on chronic respiratory failure with hypoxia    Pleural effusion, left    Pericardial effusion    Shortness of breath    Chronic respiratory failure with hypoxia, on home O2 therapy    Lipoma of torso    Chronic diastolic heart failure    COPD (chronic obstructive pulmonary disease)    Non-intractable vomiting with nausea    Diastolic dysfunction, left ventricle    Acute congestive heart failure    ESRD (end stage renal disease) on dialysis    Diarrhea    Dialysis AV fistula malfunction, sequela    Medication management    SOB (shortness of breath)    Acute on chronic heart failure    Pleural effusion    Orthopnea    Secondary hyperparathyroidism    Clotted dialysis access     Past Surgical History:   Procedure Laterality Date    BLADDER SUSPENSION      CATARACT EXTRACTION  11/18/13    left eye    CERVICAL LAMINECTOMY      x3, fusion x1    COLONOSCOPY  2009    DECLOTTING OF ARTERIOVENOUS GRAFT Left 1/3/2022    Procedure: [id number];  Surgeon: Lindsey Louie MD;  Location: [facility] OR;  Service: Vascular;  Laterality: Left;    DECLOTTING OF ARTERIOVENOUS GRAFT Left 2/8/2022    Procedure: [id number];  Surgeon: Lindsey Louie MD;  Location: [facility] OR;  Service: Vascular;  Laterality:  Left;    FISTULOGRAM N/A 2/27/2020    Procedure: Fistulogram;  Surgeon: Noe Benitez MD;  Location: [facility] CATH LAB/EP;  Service: Cardiology;  Laterality: N/A;    HYSTERECTOMY      JOINT REPLACEMENT  2001    total right knee     LUMBAR LAMINECTOMY      x 3, fusion x1    OOPHORECTOMY      PERIPHERAL ANGIOGRAPHY N/A 3/9/2020    Procedure: Peripheral angiography;  Surgeon: Jacinto Henriquez MD;  Location: [facility] CATH LAB/EP;  Service: Cardiology;  Laterality: N/A;    PLACEMENT OF ARTERIOVENOUS GRAFT Left 1/21/2020    Procedure: INSERTION, GRAFT, ARTERIOVENOUS;  Surgeon: Lindsey Louie MD;  Location: [facility] OR;  Service: General;  Laterality: Left;    THROMBECTOMY Left 2/3/2020    Procedure: THROMBECTOMY;  Surgeon: Lindsey Louie MD;  Location: [facility] OR;  Service: General;  Laterality: Left;    THROMBECTOMY  3/9/2020    Procedure: Thrombectomy;  Surgeon: Jacinto Henriquez MD;  Location: [facility] CATH LAB/EP;  Service: Cardiology;;     No current facility-administered medications on file prior to encounter.     Current Outpatient Medications on File Prior to Encounter   Medication Sig Dispense Refill    acetaminophen (TYLENOL) 325 MG tablet Take 1 tablet (325 mg total) by mouth every 6 (six) hours as needed for Pain. 10 tablet 0    albuterol (VENTOLIN HFA) 90 mcg/actuation inhaler Inhale 2 puffs into the lungs every 6 (six) hours as needed for Wheezing or Shortness of Breath. Rescue 18 g 3    albuterol-ipratropium (DUO-NEB) 2.5 mg-0.5 mg/3 mL nebulizer solution Take 3 mLs by nebulization every 6 (six) hours as needed for Shortness of Breath. Rescue 180 mL 11    B complex-vitamin C-folic acid (LINDA-RADHA) 0.8 mg Tab Take 1 tablet (0.8 mg total) by mouth once daily. 30 tablet 11    clonazePAM (KLONOPIN) 1 MG tablet TAKE 1 TABLET BY MOUTH 3 TIMES WEEKLY ON DIALYSIS DAYS 15 tablet 2    diclofenac sodium (VOLTAREN) 1 % Gel Apply 2 g topically 3 (three) times daily as needed (apply). 300 g 3     fluticasone-umeclidin-vilanter (TRELEGY ELLIPTA) 200-62.5-25 mcg inhaler Inhale 1 puff into the lungs once daily. 60 each 11    HYDROcodone-acetaminophen (NORCO)  mg per tablet Take 1 tablet by mouth 3 (three) times daily as needed for Pain. 90 tablet 0    mirtazapine (REMERON) 15 MG tablet Take 1 tablet (15 mg total) by mouth every evening. 30 tablet 11    sevelamer carbonate (RENVELA) 800 mg Tab Take 1 tablet (800 mg total) by mouth after meals as needed. 90 tablet 11    allopurinoL (ZYLOPRIM) 100 MG tablet Take 1 tablet (100 mg total) by mouth on Tuesday, Thursday, Saturday, and Sunday. 30 tablet 0    busPIRone (BUSPAR) 10 MG tablet Take 10 mg by mouth once daily.      diphenhydrAMINE (BENADRYL) 25 mg capsule Take 25 mg by mouth every 6 (six) hours as needed for Itching.      epoetin hemant-epbx (RETACRIT) 10,000 unit/mL imjection Inject 0.5 mLs (5,000 Units total) into the skin every Mon, Wed, Fri.      lidocaine-prilocaine (EMLA) cream Apply topically to left arm prior to dialysis. 30 g 0    zolpidem (AMBIEN) 5 MG Tab Take 5 mg by mouth nightly.       Review of patient's allergies indicates:   Allergen Reactions    Aspirin      Other reaction(s): hx of ulcers    Tetracycline Swelling     Other reaction(s): Swelling    Penicillins Rash     Other reaction(s): Hives  Other reaction(s): Rash  Other reaction(s): Rash  Other reaction(s): Hives         Anesthesia Plan  Type of Anesthesia, risks & benefits discussed:    Anesthesia Type: MAC, Regional  Intra-op Monitoring Plan: Standard ASA Monitors  Post Op Pain Control Plan: multimodal analgesia and IV/PO Opioids PRN  Induction:  IV  Informed Consent: Informed consent signed with the Patient and all parties understand the risks and agree with anesthesia plan.  All questions answered.   ASA Score: 4  Day of Surgery Review of History & Physical: H&P Update referred to the surgeon/provider.    Ready For Surgery From Anesthesia Perspective.     .      TTE  11/10/20  · The left ventricle is normal in size with hyperdynamic systolic function. The estimated ejection fraction is 70-75%.  · There is left ventricular concentric remodeling.  · Indeterminate diastolic function.  · The estimated PA systolic pressure is 31 mmHg.  · Mild right ventricular enlargement with normal right ventricular systolic function.  · Normal central venous pressure (3 mmHg).

## 2022-04-07 NOTE — PT/OT/SLP PROGRESS
Physical Therapy      Patient Name:  Katie Quevedo   MRN:  [id number]    1509 - Patient not seen today secondary to off unit in dialysis. Will follow-up next tx date.

## 2022-04-07 NOTE — PROGRESS NOTES
04/07/2022 @ 2:38PM - RSW attempted to contact patient for initial visit assessment. RSW unable to meet with pt due to pt SOPHIA. RSW will follow up with patient at a later time.      IP Liaison - Initial Visit Note    Patient: Katie Quevedo  MRN:  [id number]  Date of Service:  4/8/2022  Completed by:  KING Bermudez    Reason for Visit   Patient presents with    IP Liaison Initial Visit       RSW met with patient at bedside in order to complete SDOH questionnaire and liaison assessment.  Pt has identified no social barriers to care. Per pt, pt is not in need of resources at this time.    The following were addressed during this visit:  - Review SDOH Questions   - Complete patient assessment   - Complete initial visit with patient        Patient Summary     IP Liaison Patient Assessment    General  Level of Caregiver support: Member independent and does not need caregiver assistance  Have you had to make a decision between paying for any of the following in the last 2 months?: None  Transportation means: Family, Self  Employment status: Not employed  Assessments  Was the PHQ Depression Screening completed this visit?: No  Was the COCO-7 Screening completed this visit?: No         KING Bermudez

## 2022-04-08 ENCOUNTER — ANESTHESIA (OUTPATIENT)
Dept: SURGERY | Facility: HOSPITAL | Age: 77
DRG: 252 | End: 2022-04-08
Payer: MEDICARE

## 2022-04-08 ENCOUNTER — ANESTHESIA EVENT (OUTPATIENT)
Dept: SURGERY | Facility: HOSPITAL | Age: 77
DRG: 252 | End: 2022-04-08
Payer: MEDICARE

## 2022-04-08 LAB
ALBUMIN SERPL BCP-MCNC: 3.2 G/DL (ref 3.5–5.2)
ALP SERPL-CCNC: 249 U/L (ref 55–135)
ALT SERPL W/O P-5'-P-CCNC: 9 U/L (ref 10–44)
ANION GAP SERPL CALC-SCNC: 12 MMOL/L (ref 8–16)
AST SERPL-CCNC: 17 U/L (ref 10–40)
BILIRUB SERPL-MCNC: 0.6 MG/DL (ref 0.1–1)
BUN SERPL-MCNC: 73 MG/DL (ref 8–23)
CALCIUM SERPL-MCNC: 8.8 MG/DL (ref 8.7–10.5)
CHLORIDE SERPL-SCNC: 104 MMOL/L (ref 95–110)
CO2 SERPL-SCNC: 21 MMOL/L (ref 23–29)
CREAT SERPL-MCNC: 3.3 MG/DL (ref 0.5–1.4)
EST. GFR  (AFRICAN AMERICAN): 15 ML/MIN/1.73 M^2
EST. GFR  (NON AFRICAN AMERICAN): 13 ML/MIN/1.73 M^2
GLUCOSE SERPL-MCNC: 73 MG/DL (ref 70–110)
POCT GLUCOSE: 81 MG/DL (ref 70–110)
POCT GLUCOSE: 82 MG/DL (ref 70–110)
POTASSIUM SERPL-SCNC: 5.4 MMOL/L (ref 3.5–5.1)
PROT SERPL-MCNC: 6.6 G/DL (ref 6–8.4)
SODIUM SERPL-SCNC: 137 MMOL/L (ref 136–145)

## 2022-04-08 PROCEDURE — 36415 COLL VENOUS BLD VENIPUNCTURE: CPT | Performed by: STUDENT IN AN ORGANIZED HEALTH CARE EDUCATION/TRAINING PROGRAM

## 2022-04-08 PROCEDURE — 37000009 HC ANESTHESIA EA ADD 15 MINS: Performed by: SURGERY

## 2022-04-08 PROCEDURE — 36000706: Performed by: SURGERY

## 2022-04-08 PROCEDURE — 94640 AIRWAY INHALATION TREATMENT: CPT

## 2022-04-08 PROCEDURE — C1757 CATH, THROMBECTOMY/EMBOLECT: HCPCS | Performed by: SURGERY

## 2022-04-08 PROCEDURE — 25000003 PHARM REV CODE 250: Performed by: NURSE ANESTHETIST, CERTIFIED REGISTERED

## 2022-04-08 PROCEDURE — 80053 COMPREHEN METABOLIC PANEL: CPT | Performed by: STUDENT IN AN ORGANIZED HEALTH CARE EDUCATION/TRAINING PROGRAM

## 2022-04-08 PROCEDURE — 37000008 HC ANESTHESIA 1ST 15 MINUTES: Performed by: SURGERY

## 2022-04-08 PROCEDURE — 27000221 HC OXYGEN, UP TO 24 HOURS

## 2022-04-08 PROCEDURE — 63600175 PHARM REV CODE 636 W HCPCS: Performed by: STUDENT IN AN ORGANIZED HEALTH CARE EDUCATION/TRAINING PROGRAM

## 2022-04-08 PROCEDURE — 25000003 PHARM REV CODE 250: Performed by: SURGERY

## 2022-04-08 PROCEDURE — 25000003 PHARM REV CODE 250: Performed by: STUDENT IN AN ORGANIZED HEALTH CARE EDUCATION/TRAINING PROGRAM

## 2022-04-08 PROCEDURE — 94761 N-INVAS EAR/PLS OXIMETRY MLT: CPT

## 2022-04-08 PROCEDURE — 63600175 PHARM REV CODE 636 W HCPCS

## 2022-04-08 PROCEDURE — 99900035 HC TECH TIME PER 15 MIN (STAT)

## 2022-04-08 PROCEDURE — 63600175 PHARM REV CODE 636 W HCPCS: Performed by: NURSE ANESTHETIST, CERTIFIED REGISTERED

## 2022-04-08 PROCEDURE — 11000001 HC ACUTE MED/SURG PRIVATE ROOM

## 2022-04-08 PROCEDURE — 25000242 PHARM REV CODE 250 ALT 637 W/ HCPCS

## 2022-04-08 PROCEDURE — 80100016 HC MAINTENANCE HEMODIALYSIS

## 2022-04-08 PROCEDURE — 36000707: Performed by: SURGERY

## 2022-04-08 PROCEDURE — 63600175 PHARM REV CODE 636 W HCPCS: Performed by: SURGERY

## 2022-04-08 RX ORDER — ETOMIDATE 2 MG/ML
INJECTION INTRAVENOUS
Status: DISCONTINUED | OUTPATIENT
Start: 2022-04-08 | End: 2022-04-08

## 2022-04-08 RX ORDER — FLUTICASONE FUROATE, UMECLIDINIUM BROMIDE AND VILANTEROL TRIFENATATE 200; 62.5; 25 UG/1; UG/1; UG/1
1 POWDER RESPIRATORY (INHALATION) DAILY
Qty: 60 EACH | Refills: 11 | Status: SHIPPED | OUTPATIENT
Start: 2022-04-08 | End: 2023-03-14 | Stop reason: SDUPTHER

## 2022-04-08 RX ORDER — CLINDAMYCIN PHOSPHATE 900 MG/50ML
INJECTION, SOLUTION INTRAVENOUS
Status: DISCONTINUED | OUTPATIENT
Start: 2022-04-08 | End: 2022-04-08

## 2022-04-08 RX ORDER — PHENYLEPHRINE HYDROCHLORIDE 10 MG/ML
INJECTION INTRAVENOUS
Status: DISCONTINUED | OUTPATIENT
Start: 2022-04-08 | End: 2022-04-08

## 2022-04-08 RX ORDER — PROPOFOL 10 MG/ML
VIAL (ML) INTRAVENOUS CONTINUOUS PRN
Status: DISCONTINUED | OUTPATIENT
Start: 2022-04-08 | End: 2022-04-08

## 2022-04-08 RX ORDER — LIDOCAINE HYDROCHLORIDE 10 MG/ML
INJECTION, SOLUTION EPIDURAL; INFILTRATION; INTRACAUDAL; PERINEURAL
Status: DISCONTINUED | OUTPATIENT
Start: 2022-04-08 | End: 2022-04-08 | Stop reason: HOSPADM

## 2022-04-08 RX ORDER — LIDOCAINE HYDROCHLORIDE 20 MG/ML
INJECTION INTRAVENOUS
Status: DISCONTINUED | OUTPATIENT
Start: 2022-04-08 | End: 2022-04-08

## 2022-04-08 RX ORDER — HEPARIN SODIUM 1000 [USP'U]/ML
INJECTION, SOLUTION INTRAVENOUS; SUBCUTANEOUS
Status: DISCONTINUED | OUTPATIENT
Start: 2022-04-08 | End: 2022-04-08

## 2022-04-08 RX ADMIN — MIRTAZAPINE 15 MG: 7.5 TABLET ORAL at 10:04

## 2022-04-08 RX ADMIN — MUPIROCIN: 20 OINTMENT TOPICAL at 10:04

## 2022-04-08 RX ADMIN — ETOMIDATE 8 MG: 2 INJECTION INTRAVENOUS at 04:04

## 2022-04-08 RX ADMIN — IPRATROPIUM BROMIDE AND ALBUTEROL SULFATE 3 ML: .5; 3 SOLUTION RESPIRATORY (INHALATION) at 07:04

## 2022-04-08 RX ADMIN — LIDOCAINE HYDROCHLORIDE 80 MG: 20 INJECTION, SOLUTION INTRAVENOUS at 04:04

## 2022-04-08 RX ADMIN — PHENYLEPHRINE HYDROCHLORIDE 200 MCG: 10 INJECTION INTRAVENOUS at 05:04

## 2022-04-08 RX ADMIN — PHENYLEPHRINE HYDROCHLORIDE 150 MCG: 10 INJECTION INTRAVENOUS at 05:04

## 2022-04-08 RX ADMIN — HEPARIN SODIUM 5000 UNITS: 5000 INJECTION INTRAVENOUS; SUBCUTANEOUS at 10:04

## 2022-04-08 RX ADMIN — OXYCODONE 5 MG: 5 TABLET ORAL at 07:04

## 2022-04-08 RX ADMIN — PROPOFOL 50 MCG/KG/MIN: 10 INJECTION, EMULSION INTRAVENOUS at 04:04

## 2022-04-08 RX ADMIN — IPRATROPIUM BROMIDE AND ALBUTEROL SULFATE 3 ML: .5; 3 SOLUTION RESPIRATORY (INHALATION) at 11:04

## 2022-04-08 RX ADMIN — PHENYLEPHRINE HYDROCHLORIDE 100 MCG: 10 INJECTION INTRAVENOUS at 05:04

## 2022-04-08 RX ADMIN — FLUTICASONE FUROATE AND VILANTEROL TRIFENATATE 1 PUFF: 200; 25 POWDER RESPIRATORY (INHALATION) at 07:04

## 2022-04-08 RX ADMIN — MELATONIN TAB 3 MG 6 MG: 3 TAB at 11:04

## 2022-04-08 RX ADMIN — ONDANSETRON 4 MG: 2 INJECTION, SOLUTION INTRAMUSCULAR; INTRAVENOUS at 02:04

## 2022-04-08 RX ADMIN — HEPARIN SODIUM 3000 UNITS: 1000 INJECTION, SOLUTION INTRAVENOUS; SUBCUTANEOUS at 05:04

## 2022-04-08 RX ADMIN — HEPARIN SODIUM 5000 UNITS: 5000 INJECTION INTRAVENOUS; SUBCUTANEOUS at 05:04

## 2022-04-08 RX ADMIN — SODIUM CHLORIDE: 0.9 INJECTION, SOLUTION INTRAVENOUS at 04:04

## 2022-04-08 RX ADMIN — CLINDAMYCIN IN 5 PERCENT DEXTROSE 600 MG: 18 INJECTION, SOLUTION INTRAVENOUS at 05:04

## 2022-04-08 RX ADMIN — ONDANSETRON 4 MG: 2 INJECTION INTRAMUSCULAR; INTRAVENOUS at 03:04

## 2022-04-08 NOTE — ANESTHESIA PREPROCEDURE EVALUATION
04/08/2022  Katie Quevedo is a 76 y.o., female PMH COPD (on 2L O2 at home and currently), ESRD on HD (MWF, last was 4/1 as patient became SOB before dialysis), HTN, and HFpEF.  H/o AVF clotting with multiple thrombectomies. Scheduled for L AVF thrombectomy.      Pre-op Assessment    I have reviewed the Patient Summary Reports.     I have reviewed the Nursing Notes. I have reviewed the NPO Status.   I have reviewed the Medications.     Review of Systems  Anesthesia Hx:  No problems with previous Anesthesia    Social:  Former Smoker, No Alcohol Use    Hematology/Oncology:         -- Anemia:   EENT/Dental:EENT/Dental Normal   Cardiovascular:   Exercise tolerance: poor Hypertension, well controlled CHF    Pulmonary:   COPD, severe Shortness of breath    Renal/:   Chronic Renal Disease, ESRD, Dialysis    Hepatic/GI:  Hepatic/GI Normal    Musculoskeletal:   Arthritis     Endocrine:  Endocrine Normal        Physical Exam  General: Alert and Oriented    Airway:  Mallampati: II   Mouth Opening: Normal  TM Distance: Normal  Tongue: Normal    Dental:  Intact    Chest/Lungs:  Decreased Breath Sounds: left and right      Past Medical History:   Diagnosis Date    Acute congestive heart failure 02/10/2020    Anemia     Bilateral renal cysts     Cataract     Chronic LBP 7/26/2012    Chronic pain     CKD (chronic kidney disease), stage IV     Colon polyp 2013    COPD (chronic obstructive pulmonary disease)     Dehydration     Encounter for blood transfusion     HTN (hypertension)     Lumbar spondylosis     Melanoma     of the lip    Metabolic bone disease     Migraines, neuralgic     Osteoporosis     Primary osteoarthritis of both knees     s/p Rt TKA    Pulmonary embolism with infarction     Seizures 1972    x1 only    Subdeltoid bursitis, L>R. 9/27/2012    Ulcer     Vitamin D deficiency disease       Patient Active Problem List   Diagnosis    Melanoma    Chronic pain    Vitamin deficiency    Cervical post-laminectomy syndrome    Lumbar postlaminectomy syndrome    Lumbosacral spondylosis without myelopathy    Isolated cervical dystonia    Primary osteoarthritis of both knees    Subdeltoid bursitis, L>R.    Other fragments of torsion dystonia    Nuclear sclerosis - Both Eyes    Rotator cuff tear, right    Biceps tendonitis    Osteoarthritis, hip, bilateral    Lumbar radiculopathy, BLE    Cervical radiculopathy, BUE    Osteoporosis    Iron deficiency anemia    Essential hypertension    Atrophy of left kidney    Kidney cysts    History of colon polyps    Acute on chronic respiratory failure with hypoxia    Pleural effusion, left    Pericardial effusion    Shortness of breath    Chronic respiratory failure with hypoxia, on home O2 therapy    Lipoma of torso    Chronic diastolic heart failure    COPD (chronic obstructive pulmonary disease)    Non-intractable vomiting with nausea    Clotted renal dialysis arteriovenous graft    Diastolic dysfunction, left ventricle    Acute congestive heart failure    ESRD (end stage renal disease) on dialysis    Diarrhea    Dialysis AV fistula malfunction, sequela    Medication management    SOB (shortness of breath)    Acute on chronic heart failure    Pleural effusion    Orthopnea    Secondary hyperparathyroidism    Clotted dialysis access     Past Surgical History:   Procedure Laterality Date    BLADDER SUSPENSION      CATARACT EXTRACTION  11/18/13    left eye    CERVICAL LAMINECTOMY      x3, fusion x1    COLONOSCOPY  2009    DECLOTTING OF ARTERIOVENOUS GRAFT Left 1/3/2022    Procedure: [id number];  Surgeon: Lindsey Louie MD;  Location: [facility] OR;  Service: Vascular;  Laterality: Left;    DECLOTTING OF ARTERIOVENOUS GRAFT Left 2/8/2022    Procedure: [id number];  Surgeon: Lindsey Louie MD;  Location:  [facility] OR;  Service: Vascular;  Laterality: Left;    FISTULOGRAM N/A 2/27/2020    Procedure: Fistulogram;  Surgeon: Noe Benitez MD;  Location: [facility] CATH LAB/EP;  Service: Cardiology;  Laterality: N/A;    HYSTERECTOMY      JOINT REPLACEMENT  2001    total right knee     LUMBAR LAMINECTOMY      x 3, fusion x1    OOPHORECTOMY      PERIPHERAL ANGIOGRAPHY N/A 3/9/2020    Procedure: Peripheral angiography;  Surgeon: Jacinto Henriquez MD;  Location: [facility] CATH LAB/EP;  Service: Cardiology;  Laterality: N/A;    PLACEMENT OF ARTERIOVENOUS GRAFT Left 1/21/2020    Procedure: INSERTION, GRAFT, ARTERIOVENOUS;  Surgeon: Lindsey Louie MD;  Location: [facility] OR;  Service: General;  Laterality: Left;    THROMBECTOMY Left 2/3/2020    Procedure: THROMBECTOMY;  Surgeon: Lindsey Louie MD;  Location: [facility] OR;  Service: General;  Laterality: Left;    THROMBECTOMY  3/9/2020    Procedure: Thrombectomy;  Surgeon: Jacinto Henriquez MD;  Location: [facility] CATH LAB/EP;  Service: Cardiology;;    THROMBECTOMY Left 4/7/2022    Procedure: THROMBECTOMY;  Surgeon: Lindsey Louie MD;  Location: [facility] OR;  Service: General;  Laterality: Left;     Current Facility-Administered Medications on File Prior to Visit   Medication Dose Route Frequency Provider Last Rate Last Admin    0.9%  NaCl infusion   Intravenous PRN Ramo Da Silva MD        0.9%  NaCl infusion   Intravenous Once Ramo Da Silva MD        acetaminophen tablet 650 mg  650 mg Oral Q4H PRN Brisa Kearns NP        albuterol-ipratropium 2.5 mg-0.5 mg/3 mL nebulizer solution 3 mL  3 mL Nebulization Q4H WAKE Brisa Kearns NP   3 mL at 04/08/22 0742    albuterol-ipratropium 2.5 mg-0.5 mg/3 mL nebulizer solution 3 mL  3 mL Nebulization Q6H PRN Brisa Kearns NP        aluminum-magnesium hydroxide-simethicone 200-200-20 mg/5 mL suspension 30 mL  30 mL Oral QID PRN Brisa Kearns NP        dextrose 10% bolus 125 mL  12.5 g Intravenous  PRN Miranda Gill MD        dextrose 10% bolus 250 mL  25 g Intravenous PRN Miranda Gill MD        fluticasone furoate-vilanteroL 200-25 mcg/dose diskus inhaler 1 puff  1 puff Inhalation Daily Brisa T. Camacho-Rivera, NP   1 puff at 04/08/22 0742    glucagon (human recombinant) injection 1 mg  1 mg Intramuscular PRN Brisa T. Camacho-Rivera, NP        glucose chewable tablet 16 g  16 g Oral PRN Brisa T. Camacho-Rivera, NP        glucose chewable tablet 24 g  24 g Oral PRN Brisa T. Camacho-Rivera, NP        heparin (porcine) injection 5,000 Units  5,000 Units Subcutaneous Q8H Brisa T. Camacho-Rivera, NP   5,000 Units at 04/08/22 0555    HYDROmorphone (PF) injection 0.2 mg  0.2 mg Intravenous Q5 Min PRN Bree Maddox MD        HYDROmorphone (PF) injection 0.5 mg  0.5 mg Intravenous Q5 Min PRN Bree Maddox MD        melatonin tablet 6 mg  6 mg Oral Nightly PRN Brisa T. Camacho-Rivera, NP   6 mg at 04/07/22 2139    mirtazapine tablet 15 mg  15 mg Oral QHS Brisa T. Camacho-Rivera, NP   15 mg at 04/07/22 2139    mupirocin 2 % ointment   Nasal BID Ramo Da Silva MD   Given at 04/07/22 2153    mupirocin 2 % ointment   Nasal On Call Procedure Lindsey Louie MD        ondansetron injection 4 mg  4 mg Intravenous Q8H PRN Brisa T. Camacho-Rivera, NP   4 mg at 04/06/22 0049    ondansetron injection 4 mg  4 mg Intravenous Daily PRN Bree Maddox MD        oxyCODONE immediate release tablet 5 mg  5 mg Oral Q3H PRN Bree Maddox MD   5 mg at 04/08/22 0706    predniSONE tablet 40 mg  40 mg Oral Daily Jennifer Bowles MD   40 mg at 04/06/22 1258    prochlorperazine injection Soln 5 mg  5 mg Intravenous Q30 Min PRN Bree Maddox MD        sevelamer carbonate tablet 800 mg  800 mg Oral TID WM Brisa Kearns NP   800 mg at 04/07/22 1745    simethicone chewable tablet 80 mg  1 tablet Oral QID PRN Brisa Kearns NP        sodium chloride 0.9% bolus 250 mL  250 mL Intravenous PRN Vadym V.  MD Avinash        sodium chloride 0.9% flush 3 mL  3 mL Intravenous PRN Brisa Kearns NP        sodium chloride 0.9% flush 3 mL  3 mL Intravenous PRN Bree Maddox MD         Current Outpatient Medications on File Prior to Visit   Medication Sig Dispense Refill    acetaminophen (TYLENOL) 325 MG tablet Take 1 tablet (325 mg total) by mouth every 6 (six) hours as needed for Pain. 10 tablet 0    albuterol (VENTOLIN HFA) 90 mcg/actuation inhaler Inhale 2 puffs into the lungs every 6 (six) hours as needed for Wheezing or Shortness of Breath. Rescue 18 g 3    albuterol-ipratropium (DUO-NEB) 2.5 mg-0.5 mg/3 mL nebulizer solution Take 3 mLs by nebulization every 6 (six) hours as needed for Shortness of Breath. Rescue 180 mL 11    allopurinoL (ZYLOPRIM) 100 MG tablet Take 1 tablet (100 mg total) by mouth on Tuesday, Thursday, Saturday, and Sunday. 30 tablet 0    B complex-vitamin C-folic acid (LINDA-RADHA) 0.8 mg Tab Take 1 tablet (0.8 mg total) by mouth once daily. 30 tablet 11    busPIRone (BUSPAR) 10 MG tablet Take 10 mg by mouth once daily.      clonazePAM (KLONOPIN) 1 MG tablet TAKE 1 TABLET BY MOUTH 3 TIMES WEEKLY ON DIALYSIS DAYS 15 tablet 2    diclofenac sodium (VOLTAREN) 1 % Gel Apply 2 g topically 3 (three) times daily as needed (apply). 300 g 3    diphenhydrAMINE (BENADRYL) 25 mg capsule Take 25 mg by mouth every 6 (six) hours as needed for Itching.      epoetin hemant-epbx (RETACRIT) 10,000 unit/mL imjection Inject 0.5 mLs (5,000 Units total) into the skin every Mon, Wed, Fri.      fluticasone-umeclidin-vilanter (TRELEGY ELLIPTA) 200-62.5-25 mcg inhaler Inhale 1 puff into the lungs once daily. 60 each 11    HYDROcodone-acetaminophen (NORCO)  mg per tablet Take 1 tablet by mouth 3 (three) times daily as needed for Pain. 90 tablet 0    lidocaine-prilocaine (EMLA) cream Apply topically to left arm prior to dialysis. 30 g 0    mirtazapine (REMERON) 15 MG tablet Take 1 tablet (15 mg total)  by mouth every evening. 30 tablet 11    predniSONE (DELTASONE) 20 MG tablet Take 2 tablets (40 mg total) by mouth once daily. for 4 days 8 tablet 0    sevelamer carbonate (RENVELA) 800 mg Tab Take 1 tablet (800 mg total) by mouth after meals as needed. 90 tablet 11    zolpidem (AMBIEN) 5 MG Tab Take 5 mg by mouth nightly.       Review of patient's allergies indicates:   Allergen Reactions    Aspirin      Other reaction(s): hx of ulcers    Tetracycline Swelling     Other reaction(s): Swelling    Penicillins Rash     Other reaction(s): Hives  Other reaction(s): Rash  Other reaction(s): Rash  Other reaction(s): Hives         Anesthesia Plan  Type of Anesthesia, risks & benefits discussed:    Anesthesia Type: MAC, Regional  Intra-op Monitoring Plan: Standard ASA Monitors  Post Op Pain Control Plan: multimodal analgesia and IV/PO Opioids PRN  Induction:  IV  Informed Consent: Informed consent signed with the Patient and all parties understand the risks and agree with anesthesia plan.  All questions answered.   ASA Score: 4  Day of Surgery Review of History & Physical: H&P Update referred to the surgeon/provider.    Ready For Surgery From Anesthesia Perspective.     .        TTE 11/10/20  · The left ventricle is normal in size with hyperdynamic systolic function. The estimated ejection fraction is 70-75%.  · There is left ventricular concentric remodeling.  · Indeterminate diastolic function.  · The estimated PA systolic pressure is 31 mmHg.  · Mild right ventricular enlargement with normal right ventricular systolic function.  · Normal central venous pressure (3 mmHg).

## 2022-04-08 NOTE — TELEPHONE ENCOUNTER
No new care gaps identified.  Powered by [email or web address]. Reference number: [id number].   4/08/2022 10:33:28 AM CDT

## 2022-04-08 NOTE — OP NOTE
[facility] - Telemetry  Brief Operative Note    SUMMARY     Surgery Date: 4/8/2022     Surgeon(s) and Role:     * Lindsey Louie MD - Primary    Assisting Surgeon: None    Pre-op Diagnosis:  CHF (congestive heart failure) [I50.9]  Clotted dialysis access, initial encounter [T82.49XA]    Post-op Diagnosis:  Post-Op Diagnosis Codes:     * CHF (congestive heart failure) [I50.9]     * Clotted dialysis access, initial encounter [T82.49XA]    Procedure(s) (LRB):  [id number] (Left)  For recurrent cloted graft   Anesthesia: Local MAC    Operative Findings: declotting thrombectomy of recurrent clotted graft done under mac anaesthesia , patient tolerated well and was sent to recovery room in stable condition  Operative  Note:  Operative Note       Surgery Date: 4/8/2022     Surgeon(s) and Role:     * Lindsey Louie MD - Primary    Pre-op Diagnosis:  CHF (congestive heart failure) [I50.9]  Clotted dialysis access, initial encounter [T82.49XA]    Post-op Diagnosis: Post-Op Diagnosis Codes:     * CHF (congestive heart failure) [I50.9]     * Clotted dialysis access, initial encounter [T82.49XA]    Procedure(s) (LRB):  [id number] (Left)    Anesthesia: Local MAC    Procedure in Detail/Findings:    After satisfactory IV sedation, the patient in supine   position, left upper arm and forearm was prepped and draped in normal sterile   manner using ChloraPrep.  A stockinette was applied.  A timeout was called and   extremity was confirmed.  The area at the takeoff of the AV fistula, left upper   arm was infiltrated using 1% Xylocaine solution.  Incision was carried down to   the deep subcutaneous tissues.  Subcutaneous bleeders clamped and bovied and   further taken down.  Short segment of graft was isolated.  Proximal and distal   control was obtained.  Graft incision was made.  Crystal catheter first induced   on the venous side.  Good backward flow was obtained.  The patient was   heparinized using 3000  units of heparin.  Crystal catheter was then introduced   on the arterial side and excellent forward flow was obtained.  The graft   incision was closed using running 5-0 Prolene suture.  The patient had good   bruit after completion of procedure.  Hemostasis satisfactorily maintained.    Wound was irrigated with antibiotic solution and then approximated using 3-0   Vicryl for subcutaneous tissue.  The skin was closed using running 4-0 nylon   suture.  Sterile gauze dressing was applied.  Instrument count, sponge count and   needle count was correct.  The patient tolerated it well.  Estimated blood loss   was 50 mL.  Specimen removed was thrombus.  No intraoperative complications.    Intraoperative finding is clotted left upper arm access.  The patient was sent   to the Recovery Room in stable condition.          Estimated Blood Loss: 50 cc         Specimens (From admission, onward)    None        Implants: * No implants in log *           Disposition: PACU - hemodynamically stable.           Condition: Good    Attestation:  I performed the procedure.

## 2022-04-08 NOTE — PT/OT/SLP PROGRESS
Physical Therapy      Patient Name:  Katie Quevedo   MRN:  [id number]    1200 -Patient declined at this time secondary to going to surgery soon. Will follow-up next tx date.

## 2022-04-08 NOTE — PLAN OF CARE
[place] - Telemetry      HOME HEALTH ORDERS  FACE TO FACE ENCOUNTER    Patient Name: Katie Quevedo  YOB: 1945    PCP: Kingston Verduzco MD   PCP Address: [address] / [place] [address]  PCP Phone Number: [phone]  PCP Fax: [phone]    Encounter Date: 4/5/22    Admit to Home Health    Diagnoses:  Active Hospital Problems    Diagnosis  POA    *Clotted renal dialysis arteriovenous graft [T82.868A]  Yes    ESRD (end stage renal disease) on dialysis [N18.6, Z99.2]  Not Applicable     Chronic             COPD (chronic obstructive pulmonary disease) [J44.9]  Yes     Chronic     Pt has MMRC 3 symptoms of dyspnea at baseline. It is not clear to me that her current symptoms are related to her COPD. Recommend continuing trelegy for now & prn nebulizer treatments.       Chronic respiratory failure with hypoxia, on home O2 therapy [J96.11, Z99.81]  Not Applicable     Chronic     Home Oxygen    Face to face visit with pt regarding their home oxygen.  We have discussed the need for oxygen including continuous use, prn use or night time use (as appropriate for the pt).  We discussed the need for compliance with the therapy. We will do recertifications as necessary.         Acute on chronic respiratory failure with hypoxia [J96.21]  Yes      Resolved Hospital Problems   No resolved problems to display.       Follow Up Appointments:  Future Appointments   Date Time Provider Department Center   4/13/2022  1:30 PM Kingston Verduczo MD [facility]   4/14/2022  3:00 PM Lindsey Louie MD [facility]   4/19/2022 10:00 AM Pushpa Mcmahon MD [facility]YS[facility]   5/13/2022  3:30 PM Carmelita Nowak MD [facility] PULMSVC [facility]       Allergies:  Review of patient's allergies indicates:   Allergen Reactions    Aspirin      Other reaction(s): hx of ulcers    Tetracycline Swelling     Other reaction(s): Swelling    Penicillins Rash     Other reaction(s): Hives  Other  reaction(s): Rash  Other reaction(s): Rash  Other reaction(s): Hives       Medications: Review discharge medications with patient and family and provide education.    Current Facility-Administered Medications   Medication Dose Route Frequency Provider Last Rate Last Admin    0.9%  NaCl infusion   Intravenous PRN Ramo Da Silva MD        0.9%  NaCl infusion   Intravenous Once Ramo Da Silva MD        acetaminophen tablet 650 mg  650 mg Oral Q4H PRN Brisa T. Som, EFREN        albuterol-ipratropium 2.5 mg-0.5 mg/3 mL nebulizer solution 3 mL  3 mL Nebulization Q4H WAKE Brisa T. Som, NP   3 mL at 04/08/22 0742    albuterol-ipratropium 2.5 mg-0.5 mg/3 mL nebulizer solution 3 mL  3 mL Nebulization Q6H PRN Brisa T. Som, NP        aluminum-magnesium hydroxide-simethicone 200-200-20 mg/5 mL suspension 30 mL  30 mL Oral QID PRN Brisa T. Som, NP        dextrose 10% bolus 125 mL  12.5 g Intravenous PRN Miranda Gill MD        dextrose 10% bolus 250 mL  25 g Intravenous PRN Miranda Gill MD        fluticasone furoate-vilanteroL 200-25 mcg/dose diskus inhaler 1 puff  1 puff Inhalation Daily Brisa T. Som, NP   1 puff at 04/08/22 0742    glucagon (human recombinant) injection 1 mg  1 mg Intramuscular PRN Brisa T. Som, NP        glucose chewable tablet 16 g  16 g Oral PRN Brisa T. Camacho-Rivera, NP        glucose chewable tablet 24 g  24 g Oral PRN Brisa T. Camacho-Rivera, NP        heparin (porcine) injection 5,000 Units  5,000 Units Subcutaneous Q8H Brisa T. Camacho-Rivera, NP   5,000 Units at 04/08/22 0555    HYDROmorphone (PF) injection 0.2 mg  0.2 mg Intravenous Q5 Min PRN Bree Maddox MD        HYDROmorphone (PF) injection 0.5 mg  0.5 mg Intravenous Q5 Min PRN Bree Maddox MD        melatonin tablet 6 mg  6 mg Oral Nightly PRN Brisa T. Camacho-Rivera, NP   6 mg at 04/07/22 2139    mirtazapine tablet 15 mg  15 mg Oral QHS Brisa Kearns NP   15 mg at  04/07/22 2139    mupirocin 2 % ointment   Nasal BID Ramo Da Silva MD   Given at 04/07/22 2153    mupirocin 2 % ointment   Nasal On Call Procedure [place] Louie MD        ondansetron injection 4 mg  4 mg Intravenous Q8H PRN Brisa Kearns, NP   4 mg at 04/06/22 0049    ondansetron injection 4 mg  4 mg Intravenous Daily PRN Bree Maddox MD        oxyCODONE immediate release tablet 5 mg  5 mg Oral Q3H PRN Bree Maddox MD   5 mg at 04/08/22 0706    predniSONE tablet 40 mg  40 mg Oral Daily Jennifer Bowles MD   40 mg at 04/06/22 1258    prochlorperazine injection Soln 5 mg  5 mg Intravenous Q30 Min PRN Bree Maddox MD        sevelamer carbonate tablet 800 mg  800 mg Oral TID WM Brisaamanda Kearns NP   800 mg at 04/07/22 1745    simethicone chewable tablet 80 mg  1 tablet Oral QID PRN Brisaamanda Kearns, NP        sodium chloride 0.9% bolus 250 mL  250 mL Intravenous PRN Ramo Da Silva MD        sodium chloride 0.9% flush 3 mL  3 mL Intravenous PRN Brisa EDUARDO Camacho-Rivera, NP        sodium chloride 0.9% flush 3 mL  3 mL Intravenous PRN Bree Maddox MD         Current Discharge Medication List      START taking these medications    Details   predniSONE (DELTASONE) 20 MG tablet Take 2 tablets (40 mg total) by mouth once daily. for 4 days  Qty: 8 tablet, Refills: 0         CONTINUE these medications which have NOT CHANGED    Details   acetaminophen (TYLENOL) 325 MG tablet Take 1 tablet (325 mg total) by mouth every 6 (six) hours as needed for Pain.  Qty: 10 tablet, Refills: 0      albuterol (VENTOLIN HFA) 90 mcg/actuation inhaler Inhale 2 puffs into the lungs every 6 (six) hours as needed for Wheezing or Shortness of Breath. Rescue  Qty: 18 g, Refills: 3    Comments: May substitute for insurance needs  Associated Diagnoses: Centrilobular emphysema      albuterol-ipratropium (DUO-NEB) 2.5 mg-0.5 mg/3 mL nebulizer solution Take 3 mLs by nebulization every 6 (six) hours as  needed for Shortness of Breath. Rescue  Qty: 180 mL, Refills: 11      B complex-vitamin C-folic acid (LINDA-RADHA) 0.8 mg Tab Take 1 tablet (0.8 mg total) by mouth once daily.  Qty: 30 tablet, Refills: 11      clonazePAM (KLONOPIN) 1 MG tablet TAKE 1 TABLET BY MOUTH 3 TIMES WEEKLY ON DIALYSIS DAYS  Qty: 15 tablet, Refills: 2      diclofenac sodium (VOLTAREN) 1 % Gel Apply 2 g topically 3 (three) times daily as needed (apply).  Qty: 300 g, Refills: 3      fluticasone-umeclidin-vilanter (TRELEGY ELLIPTA) 200-62.5-25 mcg inhaler Inhale 1 puff into the lungs once daily.  Qty: 60 each, Refills: 11      HYDROcodone-acetaminophen (NORCO)  mg per tablet Take 1 tablet by mouth 3 (three) times daily as needed for Pain.  Qty: 90 tablet, Refills: 0    Comments: Medically necessary > 7 days due to chronic pain.  Associated Diagnoses: Chronic midline low back pain with right-sided sciatica; Chronic neck pain; Primary osteoarthritis of both hips      mirtazapine (REMERON) 15 MG tablet Take 1 tablet (15 mg total) by mouth every evening.  Qty: 30 tablet, Refills: 11      sevelamer carbonate (RENVELA) 800 mg Tab Take 1 tablet (800 mg total) by mouth after meals as needed.  Qty: 90 tablet, Refills: 11      allopurinoL (ZYLOPRIM) 100 MG tablet Take 1 tablet (100 mg total) by mouth on Tuesday, Thursday, Saturday, and Sunday.  Qty: 30 tablet, Refills: 0      busPIRone (BUSPAR) 10 MG tablet Take 10 mg by mouth once daily.      diphenhydrAMINE (BENADRYL) 25 mg capsule Take 25 mg by mouth every 6 (six) hours as needed for Itching.      epoetin hemant-epbx (RETACRIT) 10,000 unit/mL imjection Inject 0.5 mLs (5,000 Units total) into the skin every Mon, Wed, Fri.      lidocaine-prilocaine (EMLA) cream Apply topically to left arm prior to dialysis.  Qty: 30 g, Refills: 0      zolpidem (AMBIEN) 5 MG Tab Take 5 mg by mouth nightly.         STOP taking these medications       levoFLOXacin (LEVAQUIN) 500 MG tablet Comments:   Reason for Stopping:                  I have seen and examined this patient within the last 30 days. My clinical findings that support the need for the home health skilled services and home bound status are the following:no   Weakness/numbness causing balance and gait disturbance due to Weakness/Debility making it taxing to leave home.     Diet:   cardiac diet and renal diet        Referrals/ Consults  Physical Therapy to evaluate and treat. Evaluate for home safety and equipment needs; Establish/upgrade home exercise program. Perform / instruct on therapeutic exercises, gait training, transfer training, and Range of Motion.  Occupational Therapy to evaluate and treat. Evaluate home environment for safety and equipment needs. Perform/Instruct on transfers, ADL training, ROM, and therapeutic exercises.    Activities:   activity as tolerated    Nursing:   Agency to admit patient within 24 hours of hospital discharge unless specified on physician order or at patient request    SN to complete comprehensive assessment including routine vital signs. Instruct on disease process and s/s of complications to report to MD. Review/verify medication list sent home with the patient at time of discharge  and instruct patient/caregiver as needed. Frequency may be adjusted depending on start of care date.     Skilled nurse to perform up to 3 visits PRN for symptoms related to diagnosis    Notify MD if SBP > 160 or < 90; DBP > 90 or < 50; HR > 120 or < 50; Temp > 101; O2 < 88%; Other:       Ok to schedule additional visits based on staff availability and patient request on consecutive days within the home health episode.    When multiple disciplines ordered:    Start of Care occurs on Sunday - Wednesday schedule remaining discipline evaluations as ordered on separate consecutive days following the start of care.    Thursday SOC -schedule subsequent evaluations Friday and Monday the following week.     Friday - Saturday SOC - schedule subsequent discipline  evaluations on consecutive days starting Monday of the following week.    For all post-discharge communication and subsequent orders please contact patient's primary care physician. If unable to reach primary care physician or do not receive response within 30 minutes, please contact  for clinical staff order clarification    Miscellaneous   Home Oxygen:  No change 2L NC    Home Health Aide:  Nursing Three times weekly, Physical Therapy Three times weekly and Occupational Therapy Three times weekly      I certify that this patient is confined to her home and needs intermittent skilled nursing care, speech therapy and occupational therapy.

## 2022-04-08 NOTE — PROGRESS NOTES
ssurgery follow up  .,vs  No intake/output data recorded.  No intake/output data recorded.      Recent Results (from the past 336 hour(s))   CBC auto differential    Collection Time: 04/06/22  5:54 AM   Result Value Ref Range    WBC 8.48 3.90 - 12.70 K/uL    Hemoglobin 11.8 (L) 12.0 - 16.0 g/dL    Hematocrit 38.1 37.0 - 48.5 %    Platelets 171 150 - 450 K/uL   CBC auto differential    Collection Time: 04/05/22  1:23 PM   Result Value Ref Range    WBC 11.24 3.90 - 12.70 K/uL    Hemoglobin 12.3 12.0 - 16.0 g/dL    Hematocrit 40.1 37.0 - 48.5 %    Platelets 218 150 - 450 K/uL   graft clotted off overnight will declott again today. Keep npo.

## 2022-04-08 NOTE — PROGRESS NOTES
Nephrology Progress Note       Consult Requested By: Jennifer Bowles*  Reason for Consult: ESRD     SUBJECTIVE:          Review of Systems   Constitutional: Negative for chills and fever.   HENT: Negative for congestion and sore throat.    Eyes: Negative for blurred vision, double vision and photophobia.   Respiratory: Negative for cough and shortness of breath.    Cardiovascular: Negative for chest pain, palpitations and leg swelling.   Gastrointestinal: Negative for abdominal pain, diarrhea, nausea and vomiting.   Genitourinary: Negative for dysuria and urgency.   Musculoskeletal: Negative for joint pain and myalgias.   Skin: Negative for itching and rash.   Neurological: Negative for dizziness, sensory change, weakness and headaches.   Endo/Heme/Allergies: Negative for polydipsia. Does not bruise/bleed easily.   Psychiatric/Behavioral: Negative for depression.       Past Medical History:   Diagnosis Date    Acute congestive heart failure 02/10/2020    Anemia     Bilateral renal cysts     Cataract     Chronic LBP 7/26/2012    Chronic pain     CKD (chronic kidney disease), stage IV     Colon polyp 2013    COPD (chronic obstructive pulmonary disease)     Dehydration     Encounter for blood transfusion     HTN (hypertension)     Lumbar spondylosis     Melanoma     of the lip    Metabolic bone disease     Migraines, neuralgic     Osteoporosis     Primary osteoarthritis of both knees     s/p Rt TKA    Pulmonary embolism with infarction     Seizures 1972    x1 only    Subdeltoid bursitis, L>R. 9/27/2012    Ulcer     Vitamin D deficiency disease      Past Surgical History:   Procedure Laterality Date    BLADDER SUSPENSION      CATARACT EXTRACTION  11/18/13    left eye    CERVICAL LAMINECTOMY      x3, fusion x1    COLONOSCOPY  2009    DECLOTTING OF ARTERIOVENOUS GRAFT Left 1/3/2022    Procedure: [id number];  Surgeon: [place] Louie MD;  Location[facility];  Service:  Vascular;  Laterality: Left;    DECLOTTING OF ARTERIOVENOUS GRAFT Left 2/8/2022    Procedure: [id number];  Surgeon: [place] Louie MD;  Location: [facility] OR;  Service: Vascular;  Laterality: Left;    FISTULOGRAM N/A 2/27/2020    Procedure: Fistulogram;  Surgeon: Noe Benitez MD;  Location: [facility] CATH LAB/EP;  Service: Cardiology;  Laterality: N/A;    HYSTERECTOMY      JOINT REPLACEMENT  2001    total right knee     LUMBAR LAMINECTOMY      x 3, fusion x1    OOPHORECTOMY      PERIPHERAL ANGIOGRAPHY N/A 3/9/2020    Procedure: Peripheral angiography;  Surgeon: Jacinto Henriquez MD;  Location: [facility] CATH LAB/EP;  Service: Cardiology;  Laterality: N/A;    PLACEMENT OF ARTERIOVENOUS GRAFT Left 1/21/2020    Procedure: INSERTION, GRAFT, ARTERIOVENOUS;  Surgeon: [place] Louie MD;  Location: [facility] OR;  Service: General;  Laterality: Left;    THROMBECTOMY Left 2/3/2020    Procedure: THROMBECTOMY;  Surgeon: [place] Louie MD;  Location: [facility] OR;  Service: General;  Laterality: Left;    THROMBECTOMY  3/9/2020    Procedure: Thrombectomy;  Surgeon: Jacinto Henriquez MD;  Location: [facility] CATH LAB/EP;  Service: Cardiology;;     Family History   Problem Relation Age of Onset    Arthritis Mother     Stroke Mother     Hypertension Father     Cancer Father     Cataracts Father     Diabetes Maternal Aunt     Hypertension Maternal Grandfather     Heart disease Maternal Grandfather     Heart attack Maternal Grandfather     Cataracts Sister     Glaucoma Cousin      Social History     Tobacco Use    Smoking status: Former Smoker     Types: Cigarettes    Smokeless tobacco: Former User     Quit date: 2/3/2015   Substance Use Topics    Alcohol use: Not Currently     Comment: Rare    Drug use: No       Review of patient's allergies indicates:   Allergen Reactions    Aspirin      Other reaction(s): hx of ulcers    Tetracycline Swelling     Other reaction(s): Swelling    Penicillins Rash     Other  reaction(s): Hives  Other reaction(s): Rash  Other reaction(s): Rash  Other reaction(s): Hives            OBJECTIVE:     Vital Signs (Most Recent)  Vitals:    04/08/22 0706 04/08/22 0726 04/08/22 0742 04/08/22 0746   BP:  (!) 104/56     BP Location:  Left leg     Patient Position:       Pulse:  65 63 63   Resp: 16 18 18 18   Temp:  97.6 °F (36.4 °C)     TempSrc:  Oral     SpO2:  97% 98% 98%   Weight:       Height:                     Medications:          Physical Exam  Vitals and nursing note reviewed.   Constitutional:       General: She is not in acute distress.     Appearance: She is not diaphoretic.   HENT:      Head: Normocephalic and atraumatic.      Mouth/Throat:      Pharynx: No oropharyngeal exudate.   Eyes:      General: No scleral icterus.     Conjunctiva/sclera: Conjunctivae normal.      Pupils: Pupils are equal, round, and reactive to light.   Cardiovascular:      Rate and Rhythm: Normal rate and regular rhythm.      Heart sounds: Normal heart sounds. No murmur heard.  Pulmonary:      Effort: Pulmonary effort is normal. No respiratory distress.      Breath sounds: Wheezing present.   Abdominal:      General: Bowel sounds are normal. There is no distension.      Palpations: Abdomen is soft.      Tenderness: There is no abdominal tenderness.   Musculoskeletal:         General: Normal range of motion.      Cervical back: Normal range of motion and neck supple.      Comments: AVF no thrill    Skin:     General: Skin is warm and dry.      Findings: No erythema.   Neurological:      Mental Status: She is alert and oriented to person, place, and time.      Cranial Nerves: No cranial nerve deficit.   Psychiatric:         Mood and Affect: Affect normal.         Cognition and Memory: Memory normal.         Judgment: Judgment normal.         Laboratory:  Recent Labs   Lab 04/05/22  1323 04/06/22  0554   WBC 11.24 8.48   HGB 12.3 11.8*   HCT 40.1 38.1    171   MONO 9.8  1.1* 0.8*  0.1*     Recent Labs    Lab 04/05/22  1323 04/06/22  0554 04/07/22  0921    133*  133* 133*   K 3.9 4.8  4.8 5.2*   CL 99 101  101 99   CO2 26 22*  22* 23   BUN 28* 22  22 63*   CREATININE 2.4* 1.7*  1.7* 3.0*   CALCIUM 9.6 9.4  9.4 9.4   PHOS  --  3.1  --        Diagnostic Results:  X-Ray: Reviewed  US: Reviewed  Echo: Reviewed  ASSESSMENT/PLAN:     1. ESRD  - HD [facility] With Dr Aura Diggs   --   AVG clotted again pending declot, HD after   -- Daily Renal Function Panel  -- Avoid Hypotension.  -- Renally dose all meds  2. HTN (I10) -  Controlled   3. Anemia of chronic kidney disease treated with BILL (N18.9 D63.1) -   EPogen  with   HD as needed    Recent Labs   Lab 04/05/22  1323 04/06/22  0554   HGB 12.3 11.8*   HCT 40.1 38.1    171       Iron   Lab Results   Component Value Date    IRON 13 (L) 02/12/2020    TIBC 462 (H) 02/12/2020    FERRITIN 148 07/17/2019       4. MBD (E88.9 M90.80) -  Recent Labs   Lab 04/06/22  0554 04/07/22  0921   CALCIUM 9.4  9.4 9.4   PHOS 3.1  --      Recent Labs   Lab 04/05/22  1323 04/06/22  0554   MG 2.0 2.1  2.1       Lab Results   Component Value Date    .0 (H) 12/28/2018    CALCIUM 9.4 04/07/2022    PHOS 3.1 04/06/2022     Lab Results   Component Value Date    KJFLCIDN18DJ 26 (L) 02/12/2020     Sevelamer   Lab Results   Component Value Date    CO2 23 04/07/2022       5. Nutrition/Hypoalbuminemia (E88.09) -   Recent Labs   Lab 04/06/22  0554 04/07/22  0921   ALBUMIN 3.5 3.2*     Nepro with meals TID. Renal vitamins daily      Thank you for the consult, will follow  With any question please call [phone]  Ramo Da Silva MD    [facility]  EDGARD Bustillos MD, FACNEIL ECHEVARRIA MD,   MD CURTIS Benoit MD E. V. Harmon, NP    [address]  [place], [address] [phone]

## 2022-04-08 NOTE — SUBJECTIVE & OBJECTIVE
Interval History:  Awake and alert on 1 L nasal cannula, no reported event-continue nebs and switch steroid to p.o.  Blood pressure down trending  ESRD- nephrology on board  S/p Av fistula thrombectomy- not working- a repeat today.     Review of Systems   Constitutional:  Negative for chills, diaphoresis and fever.   HENT:  Negative for hearing loss and sore throat.    Respiratory:  Negative for cough and shortness of breath.    Cardiovascular:  Negative for chest pain and leg swelling.   Gastrointestinal:  Negative for abdominal pain, diarrhea, nausea and vomiting.   Genitourinary:  Negative for difficulty urinating and flank pain.   Musculoskeletal:  Negative for back pain.   Skin:  Negative for rash and wound.   Neurological:  Negative for dizziness, weakness and headaches.   Psychiatric/Behavioral:  Negative for agitation and confusion.    Objective:     Vital Signs (Most Recent):  Temp: 97.6 °F (36.4 °C) (04/08/22 0726)  Pulse: 63 (04/08/22 0746)  Resp: 18 (04/08/22 0746)  BP: (!) 104/56 (04/08/22 0726)  SpO2: 98 % (04/08/22 0746)   Vital Signs (24h Range):  Temp:  [96.8 °F (36 °C)-98 °F (36.7 °C)] 97.6 °F (36.4 °C)  Pulse:  [60-80] 63  Resp:  [16-22] 18  SpO2:  [95 %-99 %] 98 %  BP: ()/(49-59) 104/56     Weight: 38.6 kg (85 lb)  Body mass index is 15.55 kg/m².  No intake or output data in the 24 hours ending 04/08/22 0824     Physical Exam  Vitals and nursing note reviewed.   Constitutional:       General: She is not in acute distress.     Appearance: Normal appearance. She is not ill-appearing.      Interventions: Nasal cannula in place.      Comments: 2L   HENT:      Head: Normocephalic and atraumatic.   Cardiovascular:      Rate and Rhythm: Normal rate and regular rhythm.      Pulses: Normal pulses.      Heart sounds: Normal heart sounds. No murmur heard.    No friction rub. No gallop.      Arteriovenous access: Left arteriovenous access is present.  Pulmonary:      Effort: Pulmonary effort is normal.  No respiratory distress.      Breath sounds: Examination of the right-lower field reveals decreased breath sounds. Examination of the left-lower field reveals decreased breath sounds. Decreased breath sounds present. No wheezing or rhonchi.   Abdominal:      General: Bowel sounds are normal. There is no distension.      Palpations: Abdomen is soft.      Tenderness: There is no abdominal tenderness. There is no guarding.   Musculoskeletal:         General: No swelling.      Cervical back: Normal range of motion and neck supple.      Right lower leg: No edema.      Left lower leg: No edema.   Skin:     General: Skin is warm and dry.      Capillary Refill: Capillary refill takes less than 2 seconds.      Findings: No bruising, erythema or rash.   Neurological:      General: No focal deficit present.      Mental Status: She is alert and oriented to person, place, and time.      GCS: GCS eye subscore is 4. GCS verbal subscore is 5. GCS motor subscore is 6.   Psychiatric:         Mood and Affect: Mood normal.         Behavior: Behavior normal. Behavior is cooperative.       Significant Labs: A1C: No results for input(s): HGBA1C in the last 4320 hours.  ABGs:   No results for input(s): PH, PCO2, HCO3, POCSATURATED, BE, TOTALHB, COHB, METHB, O2HB, POCFIO2, PO2 in the last 48 hours.    Blood Culture: No results for input(s): LABBLOO in the last 48 hours.  CBC:   No results for input(s): WBC, HGB, HCT, PLT in the last 48 hours.    CMP:   Recent Labs   Lab 04/07/22  0921   *   K 5.2*   CL 99   CO2 23      BUN 63*   CREATININE 3.0*   CALCIUM 9.4   PROT 7.4   ALBUMIN 3.2*   BILITOT 0.5   ALKPHOS 265*   AST 13   ALT 10   ANIONGAP 11   EGFRNONAA 14*       Lactic Acid: No results for input(s): LACTATE in the last 48 hours.  Lipase: No results for input(s): LIPASE in the last 48 hours.  Lipid Panel: No results for input(s): CHOL, HDL, LDLCALC, TRIG, CHOLHDL in the last 48 hours.  Magnesium:   No results for input(s): MG  in the last 48 hours.    Troponin:   No results for input(s): TROPONINI in the last 48 hours.    TSH: No results for input(s): TSH in the last 4320 hours.  Urine Culture: No results for input(s): LABURIN in the last 48 hours.  Urine Studies: No results for input(s): COLORU, APPEARANCEUA, PHUR, SPECGRAV, PROTEINUA, GLUCUA, KETONESU, BILIRUBINUA, OCCULTUA, NITRITE, UROBILINOGEN, LEUKOCYTESUR, RBCUA, WBCUA, BACTERIA, SQUAMEPITHEL, HYALINECASTS in the last 48 hours.    Invalid input(s): FELIX    Significant Imaging: I have reviewed all pertinent imaging results/findings within the past 24 hours.

## 2022-04-08 NOTE — PLAN OF CARE
SW met with pt at bedside to discuss final assessment. Pt will have  Dre help transport home at time of d/c. Pt has RW in room. SW has f/u appts on avs. SW sent referrals for HH via careport. Rounds completed on pt.  All questions addressed.  Bedside nurse to discuss d/c medications.  Discussed importance to attend all f/u appts and take medications as prescribed.  Verbalized understanding.      Bam Dev, MSW  [phone]    Future Appointments   Date Time Provider Department Center   4/13/2022  1:30 PM Kingston Verduzco MD [facility] MED [facility]   4/14/2022  3:00 PM Lindsey Louie MD [facility]   4/19/2022 10:00 AM Pushpa Mcmahon MD [facility] PHYSMED [facility]   5/13/2022  3:30 PM Carmelita Nowak MD [facility] PULMSVC [facility]        04/08/22 0817   Final Note   Assessment Type Final Discharge Note   Anticipated Discharge Disposition Home-Health   What phone number can be called within the next 1-3 days to see how you are doing after discharge? [phone]   Hospital Resources/Appts/Education Provided Appointments scheduled and added to AVS   Post-Acute Status   Post-Acute Authorization Home Health   Home Health Status Referrals Sent   Coverage [employer]   Discharge Delays None known at this time

## 2022-04-08 NOTE — PROGRESS NOTES
[facility] Medicine  Progress Note    Patient Name: Katie Quevedo  MRN: [id number]  Patient Class: IP- Inpatient   Admission Date: 4/5/2022  Length of Stay: 3 days  Attending Physician: Jennifer Bowles*  Primary Care Provider: Kingston Verduzco MD        Subjective:     Principal Problem:Clotted renal dialysis arteriovenous graft        HPI:  Katie Quevedo is 69 y/o female with PMH of COPD, ESRD on HD, hypertension, AVF clot, and CHF presents with shortness of breath. Patient reports her shortness of breath started last night. Patient states it was sudden, was not relieved by her albuterol inhaler. Patient went to dialysis this morning and was accessed but did not receive any dialysis. Per EMS, the patient's SpO2 was in the 70s on their arrival.  The patient received solu-medrol x1, but did not receive any breathing treatments. Patient reports productive cough with yellow sputum production. Patient denies chest pain, headache, dizziness, lightheadedness, abdominal pain, nausea, vomiting, dysuria, hematuria, diarrhea, constipation, black/bloody stools. She reports daily use of her inhalers.  She reports multiple ED visits for breathing issues. History of tobacco use but denies current use of tobacco. Patient will be placed into hospital medicine observation services under my care in collaboration with Dr. Gulshan Goins.         Overview/Hospital Course:  No notes on file    Interval History:  Awake and alert on 1 L nasal cannula, no reported event-continue nebs and switch steroid to p.o.  Blood pressure down trending  ESRD- nephrology on board  S/p Av fistula thrombectomy- not working- a repeat today.     Review of Systems   Constitutional:  Negative for chills, diaphoresis and fever.   HENT:  Negative for hearing loss and sore throat.    Respiratory:  Negative for cough and shortness of breath.    Cardiovascular:  Negative for chest pain and leg swelling.   Gastrointestinal:  Negative for abdominal  pain, diarrhea, nausea and vomiting.   Genitourinary:  Negative for difficulty urinating and flank pain.   Musculoskeletal:  Negative for back pain.   Skin:  Negative for rash and wound.   Neurological:  Negative for dizziness, weakness and headaches.   Psychiatric/Behavioral:  Negative for agitation and confusion.    Objective:     Vital Signs (Most Recent):  Temp: 97.6 °F (36.4 °C) (04/08/22 0726)  Pulse: 63 (04/08/22 0746)  Resp: 18 (04/08/22 0746)  BP: (!) 104/56 (04/08/22 0726)  SpO2: 98 % (04/08/22 0746)   Vital Signs (24h Range):  Temp:  [96.8 °F (36 °C)-98 °F (36.7 °C)] 97.6 °F (36.4 °C)  Pulse:  [60-80] 63  Resp:  [16-22] 18  SpO2:  [95 %-99 %] 98 %  BP: ()/(49-59) 104/56     Weight: 38.6 kg (85 lb)  Body mass index is 15.55 kg/m².  No intake or output data in the 24 hours ending 04/08/22 0824     Physical Exam  Vitals and nursing note reviewed.   Constitutional:       General: She is not in acute distress.     Appearance: Normal appearance. She is not ill-appearing.      Interventions: Nasal cannula in place.      Comments: 2L   HENT:      Head: Normocephalic and atraumatic.   Cardiovascular:      Rate and Rhythm: Normal rate and regular rhythm.      Pulses: Normal pulses.      Heart sounds: Normal heart sounds. No murmur heard.    No friction rub. No gallop.      Arteriovenous access: Left arteriovenous access is present.  Pulmonary:      Effort: Pulmonary effort is normal. No respiratory distress.      Breath sounds: Examination of the right-lower field reveals decreased breath sounds. Examination of the left-lower field reveals decreased breath sounds. Decreased breath sounds present. No wheezing or rhonchi.   Abdominal:      General: Bowel sounds are normal. There is no distension.      Palpations: Abdomen is soft.      Tenderness: There is no abdominal tenderness. There is no guarding.   Musculoskeletal:         General: No swelling.      Cervical back: Normal range of motion and neck supple.       Right lower leg: No edema.      Left lower leg: No edema.   Skin:     General: Skin is warm and dry.      Capillary Refill: Capillary refill takes less than 2 seconds.      Findings: No bruising, erythema or rash.   Neurological:      General: No focal deficit present.      Mental Status: She is alert and oriented to person, place, and time.      GCS: GCS eye subscore is 4. GCS verbal subscore is 5. GCS motor subscore is 6.   Psychiatric:         Mood and Affect: Mood normal.         Behavior: Behavior normal. Behavior is cooperative.       Significant Labs: A1C: No results for input(s): HGBA1C in the last 4320 hours.  ABGs:   No results for input(s): PH, PCO2, HCO3, POCSATURATED, BE, TOTALHB, COHB, METHB, O2HB, POCFIO2, PO2 in the last 48 hours.    Blood Culture: No results for input(s): LABBLOO in the last 48 hours.  CBC:   No results for input(s): WBC, HGB, HCT, PLT in the last 48 hours.    CMP:   Recent Labs   Lab 04/07/22  0921   *   K 5.2*   CL 99   CO2 23      BUN 63*   CREATININE 3.0*   CALCIUM 9.4   PROT 7.4   ALBUMIN 3.2*   BILITOT 0.5   ALKPHOS 265*   AST 13   ALT 10   ANIONGAP 11   EGFRNONAA 14*       Lactic Acid: No results for input(s): LACTATE in the last 48 hours.  Lipase: No results for input(s): LIPASE in the last 48 hours.  Lipid Panel: No results for input(s): CHOL, HDL, LDLCALC, TRIG, CHOLHDL in the last 48 hours.  Magnesium:   No results for input(s): MG in the last 48 hours.    Troponin:   No results for input(s): TROPONINI in the last 48 hours.    TSH: No results for input(s): TSH in the last 4320 hours.  Urine Culture: No results for input(s): LABURIN in the last 48 hours.  Urine Studies: No results for input(s): COLORU, APPEARANCEUA, PHUR, SPECGRAV, PROTEINUA, GLUCUA, KETONESU, BILIRUBINUA, OCCULTUA, NITRITE, UROBILINOGEN, LEUKOCYTESUR, RBCUA, WBCUA, BACTERIA, SQUAMEPITHEL, HYALINECASTS in the last 48 hours.    Invalid input(s): FELIX    Significant Imaging: I have  reviewed all pertinent imaging results/findings within the past 24 hours.      Assessment/Plan:      * Clotted renal dialysis arteriovenous graft  S/p thrombectomy on 4/7 and AV grafting not functioning- repeat thrombectomy today 4/8      ESRD (end stage renal disease) on dialysis  -HD MWF; TIGIST fistula + thrill  -Nephrology consulted for HD management        COPD (chronic obstructive pulmonary disease)        Chronic respiratory failure with hypoxia, on home O2 therapy  See above    Acute on chronic respiratory failure with hypoxia  Chronic respiratory failure with hypoxia, on 2L home O2 therapy    Clinically-stable.  Afebrile. No Elevation of WBC, Productive Cough.  --Continue Solumedrol 125 mg IV every 12 hours and switch to PO r  --continue Breo   --DuoNeb breathing Treatments every 4 hours while awake  --PRN Oxygen per Nasal Cannula for SpO2 <88%  --Pulse-Ox Monitoring every 4 hours  --Monitor respiratory status closely  -Continue supplemental oxygen; titrate and wean to maintain SpO2 >88%  -Duo-nebs PRN for SOB/Wheezing  -ABG PRN           VTE Risk Mitigation (From admission, onward)         Ordered     heparin (porcine) injection 5,000 Units  Every 8 hours         04/05/22 1507     IP VTE HIGH RISK PATIENT  Once         04/05/22 1507     Place sequential compression device  Until discontinued         04/05/22 1507                Discharge Planning   LAITH: 4/8/2022     Code Status: Full Code   Is the patient medically ready for discharge?:     Reason for patient still in hospital (select all that apply): Patient trending condition  Discharge Plan A: Home with family   Discharge Delays: None known at this time              Jennifer Bowles MD  Department of Hospital Medicine   [place] [facility]

## 2022-04-08 NOTE — PHYSICIAN QUERY
PT Name: Katie Quevedo  MR #: [id number]   DOCUMENTATION CLARIFICATION   Bridget Hawkins RN, CCDS    [email or web address]    Documentation Excellence  This form is a permanent document in the medical record.     Query Date: April 8, 2022  By submitting this query, we are merely seeking further clarification of documentation.   Please utilize your independent clinical judgment when addressing the question(s) below.    The Medical Record contains the following   Indicators Supporting Clinical Findings Location in Medical Record   x Heart Failure documented Hx  CHF Ed md   x  Lab 4/5   x EF/Echo Echo 4/6  ·            The left ventricle is normal in size with normal systolic function.  ·            The estimated ejection fraction is 55%.  ·            Normal left ventricular diastolic function.  ·            Mild mitral regurgitation.  ·            Mild aortic regurgitation.  ·            Mild tricuspid regurgitation.  ·            Mild pulmonic regurgitation.  ·            Elevated central venous pressure (15 mmHg).  ·            The estimated PA systolic pressure is 25 mmHg.  ·            Mild right ventricular enlargement with normal right ventricular                systolic function.  ·            There is abnormal septal wall motion. There is diastolic flattening                of the interventricular septum consistent with right ventricle                volume overload.  ·            There is no pulmonary hypertension. echo   x Radiology findings CXR 4/5  Bibasilar pleural effusions, left greater than right and left basilar compressive atelectasis and or pneumonia   Background of pulmonary hyperinflation and interstitial prominence could indicate an element of interstitial edema CXR 4/5   x Subjective  /Objective Respiratory Conditions Sob, cough 2/2 fluid overload  Hypoxic requiring BiPap   Increased work of breathing with accessory muscle use & tachypnea;    - but not acutely distressed   Decreased air  movement, no wheezes/crackles  Per EMS, the patient's O2 saturation was in the 70s when they arrived  Tacypnea, increased work of breathing,   Resp distress, accessory muscle use Ed md LOGANP    Recent/Current MI      Heart Transplant, LVAD      Edema, JVD      Ascites      Diuretics/Meds     x Other Treatment Doing better on bipap  BIPAP; 2 L NC    Duonebs    ABG    ESRD  - HD MWF [facility] With Dr Aura Diggs   -- Missed Monday now SOB hypoxia      Get labs and HD with UF today a and most likely tomorrow   -- Keep MWF   -- Daily Renal Function Panel  -- Avoid Hypotension.  -- Renally dose all meds    Missed Monday now SOB hypoxia - HD with UF yesterday planned for  today     Ed md BAZZI      Renal CN 4/5              Renal PN 4/6   x Other BNP  440  Possibly consistent with CHF exacerbation, though patient has clear lungs and I have lower suspicion for this at this time     Acute on chronic respiratory failure w/hypoxia  COPD, pleural effusion    4/5   T97.3    p99          R32-43   >/=109/56    sat 98% bipap-2 L NC  4/6   T97.1    p70-111   R16-23   >/= 96/56     sat %   4/7   T98.7    p70-57     R19        >/=114/60    sat 99% 3 L NC   4/8   T98       p80          R16-22   >/=104/56    sat 95-98%  2 L NC      4/5  ABGs  PH 7.44  PCO2 37.9  PO2 43  BE 2  HCO3 25.8  Sat 81%  CPAP Ed md BAZZI      VS Flow Sheet        abgs     Heart failure is a clinical diagnosis which includes symptomatic fluid retention, elevated intracardiac pressures, and/or the inability of the heart to deliver adequate blood flow.    Heart Failure with reduced Ejection Fraction (HFrEF) or Systolic Heart Failure (loses ability to contract normally, EF is <40%)  Heart Failure with preserved Ejection Fraction (HFpEF) or Diastolic Heart Failure (stiff ventricles, does not relax properly, EF is >50%)   Heart Failure with Combined Systolic and Diastolic Failure (stiff ventricles, does not relax properly and EF is  "<50%)  Mid-range or mildly reduced ejection fraction (HFmrEF) is classified as systolic heart failure.   Common clues to acute exacerbation:  Rapidly progressive symptoms (w/in 2 weeks of presentation), using IV diuretics, using supplemental O2, pulmonary edema on Xray, new or worsening pleural effusion, +JVD or other signs of volume overload, MI w/in 4 weeks, and/or BNP >500  The clinical guidelines noted are only system guidelines, and do not replace the providers clinical judgment.    Provider, please clarify "CHF"    [ x  ]  Acute on Chronic Diastolic Heart Failure (HFpEF) - worsening of CHF signs/symptoms in preexisting CHF   [   ]  Chronic Diastolic Heart Failure (HFpEF) - preexisting and stable   [   ]  Heart Failure Ruled Out   [   ]  Other (please specify): ___________________________________   [  ]  Clinically Undetermined     Please document in your progress notes daily for the duration of treatment until resolved and include in your discharge summary.    References:  American Heart Association editorial staff. (2017, May). Ejection Fraction Heart Failure Measurement. American Heart Association. https://www.heart.org/en/health-topics/heart-failure/diagnosing-heart-failure/ejection-fraction-heart-failure-measurement#:~:text=Ejection%20fraction%20(EF)%20is%20a,pushed%20out%20with%20each%20heartbeat  ALEJANDRO Millan (2020, December 15). Heart failure with preserved ejection fraction: Clinical manifestations and diagnosis. [employer]. https://www.[employer].com/contents/heart-failure-with-preserved-ejection-fraction-clinical-manifestations-and-diagnosis.  ICD-10-CM/PCS Coding Clinic Third Quarter ICD-10, Effective with discharges: September 8, 2020 [facility] [employer] § Heart failure with mid-range or mildly reduced ejection fraction (2020).  Form No. 29774                "

## 2022-04-09 VITALS
OXYGEN SATURATION: 98 % | HEIGHT: 62 IN | SYSTOLIC BLOOD PRESSURE: 108 MMHG | WEIGHT: 85 LBS | HEART RATE: 88 BPM | TEMPERATURE: 98 F | DIASTOLIC BLOOD PRESSURE: 51 MMHG | BODY MASS INDEX: 15.64 KG/M2 | RESPIRATION RATE: 18 BRPM

## 2022-04-09 LAB
HBV SURFACE AB SER QL IA: POSITIVE
HBV SURFACE AB SERPL IA-ACNC: 30 MIU/ML

## 2022-04-09 PROCEDURE — 25000003 PHARM REV CODE 250: Performed by: INTERNAL MEDICINE

## 2022-04-09 PROCEDURE — 25000242 PHARM REV CODE 250 ALT 637 W/ HCPCS: Performed by: SURGERY

## 2022-04-09 PROCEDURE — 25000003 PHARM REV CODE 250: Performed by: SURGERY

## 2022-04-09 PROCEDURE — 63600175 PHARM REV CODE 636 W HCPCS: Performed by: SURGERY

## 2022-04-09 PROCEDURE — 94640 AIRWAY INHALATION TREATMENT: CPT

## 2022-04-09 PROCEDURE — 27000221 HC OXYGEN, UP TO 24 HOURS

## 2022-04-09 PROCEDURE — 94761 N-INVAS EAR/PLS OXIMETRY MLT: CPT

## 2022-04-09 PROCEDURE — 99900035 HC TECH TIME PER 15 MIN (STAT)

## 2022-04-09 PROCEDURE — 25000003 PHARM REV CODE 250: Performed by: FAMILY MEDICINE

## 2022-04-09 RX ORDER — MUPIROCIN 20 MG/G
OINTMENT TOPICAL
Status: DISCONTINUED | OUTPATIENT
Start: 2022-04-09 | End: 2022-04-10 | Stop reason: HOSPADM

## 2022-04-09 RX ORDER — MIDODRINE HYDROCHLORIDE 2.5 MG/1
2.5 TABLET ORAL ONCE
Status: COMPLETED | OUTPATIENT
Start: 2022-04-09 | End: 2022-04-09

## 2022-04-09 RX ADMIN — FLUTICASONE FUROATE AND VILANTEROL TRIFENATATE 1 PUFF: 200; 25 POWDER RESPIRATORY (INHALATION) at 07:04

## 2022-04-09 RX ADMIN — SEVELAMER CARBONATE 800 MG: 800 TABLET, FILM COATED ORAL at 12:04

## 2022-04-09 RX ADMIN — MUPIROCIN: 20 OINTMENT TOPICAL at 09:04

## 2022-04-09 RX ADMIN — PREDNISONE 40 MG: 20 TABLET ORAL at 08:04

## 2022-04-09 RX ADMIN — SEVELAMER CARBONATE 800 MG: 800 TABLET, FILM COATED ORAL at 08:04

## 2022-04-09 RX ADMIN — IPRATROPIUM BROMIDE AND ALBUTEROL SULFATE 3 ML: .5; 3 SOLUTION RESPIRATORY (INHALATION) at 07:04

## 2022-04-09 RX ADMIN — IPRATROPIUM BROMIDE AND ALBUTEROL SULFATE 3 ML: .5; 3 SOLUTION RESPIRATORY (INHALATION) at 02:04

## 2022-04-09 RX ADMIN — IPRATROPIUM BROMIDE AND ALBUTEROL SULFATE 3 ML: .5; 3 SOLUTION RESPIRATORY (INHALATION) at 11:04

## 2022-04-09 RX ADMIN — SODIUM ZIRCONIUM CYCLOSILICATE 10 G: 5 POWDER, FOR SUSPENSION ORAL at 02:04

## 2022-04-09 RX ADMIN — HEPARIN SODIUM 5000 UNITS: 5000 INJECTION INTRAVENOUS; SUBCUTANEOUS at 06:04

## 2022-04-09 RX ADMIN — MUPIROCIN: 20 OINTMENT TOPICAL at 08:04

## 2022-04-09 RX ADMIN — HEPARIN SODIUM 5000 UNITS: 5000 INJECTION INTRAVENOUS; SUBCUTANEOUS at 02:04

## 2022-04-09 RX ADMIN — MIRTAZAPINE 15 MG: 7.5 TABLET ORAL at 09:04

## 2022-04-09 RX ADMIN — HEPARIN SODIUM 5000 UNITS: 5000 INJECTION INTRAVENOUS; SUBCUTANEOUS at 09:04

## 2022-04-09 RX ADMIN — MIDODRINE HYDROCHLORIDE 2.5 MG: 2.5 TABLET ORAL at 02:04

## 2022-04-09 RX ADMIN — SODIUM CHLORIDE 250 ML: 0.9 INJECTION, SOLUTION INTRAVENOUS at 10:04

## 2022-04-09 RX ADMIN — SEVELAMER CARBONATE 800 MG: 800 TABLET, FILM COATED ORAL at 05:04

## 2022-04-09 RX ADMIN — MELATONIN TAB 3 MG 6 MG: 3 TAB at 09:04

## 2022-04-09 NOTE — PROGRESS NOTES
04/08/22 2100   Vital Signs   Temp 97 °F (36.1 °C)   Temp src Tympanic   Pulse 70   Heart Rate Source Monitor   Resp 18   Flow (L/min) 2   O2 Device (Oxygen Therapy) nasal cannula   BP (!) 110/54   BP Location Left leg   BP Method Automatic   Patient Position Lying   Post-Hemodialysis Assessment   Rinseback Volume (mL) 250 mL   Blood Volume Processed (Liters) 72 L   Dialyzer Clearance Lightly streaked   Duration of Treatment (minutes) 180 minutes   Additional Fluid Intake (mL) 500 mL   Total UF (mL) 1500 mL   Net Fluid Removal 1000   Patient Response to Treatment well   Arterial bleeding stop time (min) 15 min   Venous bleeding stop time (min) 10 min   Post-Hemodialysis Comments pt a+ox3, cardiac rrr, bbs clear, abd soft with + bowel sounds

## 2022-04-09 NOTE — PROGRESS NOTES
"Surgery follow up  BP (!) 73/38 (BP Location: Right leg, Patient Position: Lying)   Pulse 79   Temp 98 °F (36.7 °C) (Oral)   Resp 18   Ht 5' 2" (1.575 m)   Wt 38.6 kg (85 lb)   LMP  (LMP Unknown)   SpO2 (!) 93%   BMI 15.55 kg/m²   I/O last 3 completed shifts:  In: 700 [I.V.:200; Other:500]  Out: 1500 [Other:1500]  No intake/output data recorded.    Graft working well.  "

## 2022-04-09 NOTE — SUBJECTIVE & OBJECTIVE
Interval History:  Awake and alert on 2 L nasal cannula, no reported event-continue nebs and switch steroid to p.o.  Blood pressure down trending  ESRD- nephrology on board  S/p repeat AV fistula thrombectomy and dialyzed yesterday.     BP low- bolus fluid and monitor BP .      Review of Systems   Constitutional:  Negative for chills, diaphoresis and fever.   HENT:  Negative for hearing loss and sore throat.    Respiratory:  Negative for cough and shortness of breath.    Cardiovascular:  Negative for chest pain and leg swelling.   Gastrointestinal:  Negative for abdominal pain, diarrhea, nausea and vomiting.   Genitourinary:  Negative for difficulty urinating and flank pain.   Musculoskeletal:  Negative for back pain.   Skin:  Negative for rash and wound.   Neurological:  Negative for dizziness, weakness and headaches.   Psychiatric/Behavioral:  Negative for agitation and confusion.    Objective:     Vital Signs (Most Recent):  Temp: 97.7 °F (36.5 °C) (04/09/22 0740)  Pulse: 80 (04/09/22 0740)  Resp: 18 (04/09/22 0740)  BP: (!) 79/46 (04/09/22 0740)  SpO2: (!) 94 % (04/09/22 0740)   Vital Signs (24h Range):  Temp:  [96.8 °F (36 °C)-97.8 °F (36.6 °C)] 97.7 °F (36.5 °C)  Pulse:  [66-80] 80  Resp:  [16-18] 18  SpO2:  [92 %-97 %] 94 %  BP: ()/(44-57) 79/46     Weight: 38.6 kg (85 lb)  Body mass index is 15.55 kg/m².    Intake/Output Summary (Last 24 hours) at 4/9/2022 0908  Last data filed at 4/8/2022 2100  Gross per 24 hour   Intake 700 ml   Output 1500 ml   Net -800 ml        Physical Exam  Vitals and nursing note reviewed.   Constitutional:       General: She is not in acute distress.     Appearance: Normal appearance. She is not ill-appearing.      Interventions: Nasal cannula in place.      Comments: 2L   HENT:      Head: Normocephalic and atraumatic.   Cardiovascular:      Rate and Rhythm: Normal rate and regular rhythm.      Pulses: Normal pulses.      Heart sounds: Normal heart sounds. No murmur heard.     No friction rub. No gallop.      Arteriovenous access: Left arteriovenous access is present.  Pulmonary:      Effort: Pulmonary effort is normal. No respiratory distress.      Breath sounds: No wheezing or rhonchi.   Abdominal:      General: Bowel sounds are normal. There is no distension.      Palpations: Abdomen is soft.      Tenderness: There is no abdominal tenderness. There is no guarding.   Musculoskeletal:         General: No swelling.      Cervical back: Normal range of motion and neck supple.      Right lower leg: No edema.      Left lower leg: No edema.   Skin:     General: Skin is warm and dry.      Capillary Refill: Capillary refill takes less than 2 seconds.      Findings: No bruising, erythema or rash.   Neurological:      General: No focal deficit present.      Mental Status: She is alert and oriented to person, place, and time.      GCS: GCS eye subscore is 4. GCS verbal subscore is 5. GCS motor subscore is 6.   Psychiatric:         Mood and Affect: Mood normal.         Behavior: Behavior normal. Behavior is cooperative.       Significant Labs: A1C: No results for input(s): HGBA1C in the last 4320 hours.  ABGs:   No results for input(s): PH, PCO2, HCO3, POCSATURATED, BE, TOTALHB, COHB, METHB, O2HB, POCFIO2, PO2 in the last 48 hours.    Blood Culture: No results for input(s): LABBLOO in the last 48 hours.  CBC:   No results for input(s): WBC, HGB, HCT, PLT in the last 48 hours.    CMP:   Recent Labs   Lab 04/07/22  0921 04/08/22  1140   * 137   K 5.2* 5.4*   CL 99 104   CO2 23 21*    73   BUN 63* 73*   CREATININE 3.0* 3.3*   CALCIUM 9.4 8.8   PROT 7.4 6.6   ALBUMIN 3.2* 3.2*   BILITOT 0.5 0.6   ALKPHOS 265* 249*   AST 13 17   ALT 10 9*   ANIONGAP 11 12   EGFRNONAA 14* 13*       Lactic Acid: No results for input(s): LACTATE in the last 48 hours.  Lipase: No results for input(s): LIPASE in the last 48 hours.  Lipid Panel: No results for input(s): CHOL, HDL, LDLCALC, TRIG, CHOLHDL in the  last 48 hours.  Magnesium:   No results for input(s): MG in the last 48 hours.    Troponin:   No results for input(s): TROPONINI in the last 48 hours.    TSH: No results for input(s): TSH in the last 4320 hours.  Urine Culture: No results for input(s): LABURIN in the last 48 hours.  Urine Studies: No results for input(s): COLORU, APPEARANCEUA, PHUR, SPECGRAV, PROTEINUA, GLUCUA, KETONESU, BILIRUBINUA, OCCULTUA, NITRITE, UROBILINOGEN, LEUKOCYTESUR, RBCUA, WBCUA, BACTERIA, SQUAMEPITHEL, HYALINECASTS in the last 48 hours.    Invalid input(s): CRISTOBALSUDARREN    Significant Imaging: I have reviewed all pertinent imaging results/findings within the past 24 hours.

## 2022-04-09 NOTE — PROGRESS NOTES
[facility]  Progress Note    Patient Name: Katie Quevedo  MRN: [id number]  Patient Class: IP- Inpatient   Admission Date: 4/5/2022  Length of Stay: 4 days  Attending Physician: Jennifer Bowles*  Primary Care Provider: Kingston Verduzco MD        Subjective:     Principal Problem:Clotted renal dialysis arteriovenous graft        HPI:  Katie Quevedo is 67 y/o female with PMH of COPD, ESRD on HD, hypertension, AVF clot, and CHF presents with shortness of breath. Patient reports her shortness of breath started last night. Patient states it was sudden, was not relieved by her albuterol inhaler. Patient went to dialysis this morning and was accessed but did not receive any dialysis. Per EMS, the patient's SpO2 was in the 70s on their arrival.  The patient received solu-medrol x1, but did not receive any breathing treatments. Patient reports productive cough with yellow sputum production. Patient denies chest pain, headache, dizziness, lightheadedness, abdominal pain, nausea, vomiting, dysuria, hematuria, diarrhea, constipation, black/bloody stools. She reports daily use of her inhalers.  She reports multiple ED visits for breathing issues. History of tobacco use but denies current use of tobacco. Patient will be placed into hospital medicine observation services under my care in collaboration with Dr. Gulshan Goins.         Overview/Hospital Course:  No notes on file    Interval History:  Awake and alert on 2 L nasal cannula, no reported event-continue nebs and switch steroid to p.o.  Blood pressure down trending  ESRD- nephrology on board  S/p repeat AV fistula thrombectomy and dialyzed yesterday.     BP low- bolus fluid and monitor BP .      Review of Systems   Constitutional:  Negative for chills, diaphoresis and fever.   HENT:  Negative for hearing loss and sore throat.    Respiratory:  Negative for cough and shortness of breath.    Cardiovascular:  Negative for chest pain and leg swelling.    Gastrointestinal:  Negative for abdominal pain, diarrhea, nausea and vomiting.   Genitourinary:  Negative for difficulty urinating and flank pain.   Musculoskeletal:  Negative for back pain.   Skin:  Negative for rash and wound.   Neurological:  Negative for dizziness, weakness and headaches.   Psychiatric/Behavioral:  Negative for agitation and confusion.    Objective:     Vital Signs (Most Recent):  Temp: 97.7 °F (36.5 °C) (04/09/22 0740)  Pulse: 80 (04/09/22 0740)  Resp: 18 (04/09/22 0740)  BP: (!) 79/46 (04/09/22 0740)  SpO2: (!) 94 % (04/09/22 0740)   Vital Signs (24h Range):  Temp:  [96.8 °F (36 °C)-97.8 °F (36.6 °C)] 97.7 °F (36.5 °C)  Pulse:  [66-80] 80  Resp:  [16-18] 18  SpO2:  [92 %-97 %] 94 %  BP: ()/(44-57) 79/46     Weight: 38.6 kg (85 lb)  Body mass index is 15.55 kg/m².    Intake/Output Summary (Last 24 hours) at 4/9/2022 0908  Last data filed at 4/8/2022 2100  Gross per 24 hour   Intake 700 ml   Output 1500 ml   Net -800 ml        Physical Exam  Vitals and nursing note reviewed.   Constitutional:       General: She is not in acute distress.     Appearance: Normal appearance. She is not ill-appearing.      Interventions: Nasal cannula in place.      Comments: 2L   HENT:      Head: Normocephalic and atraumatic.   Cardiovascular:      Rate and Rhythm: Normal rate and regular rhythm.      Pulses: Normal pulses.      Heart sounds: Normal heart sounds. No murmur heard.    No friction rub. No gallop.      Arteriovenous access: Left arteriovenous access is present.  Pulmonary:      Effort: Pulmonary effort is normal. No respiratory distress.      Breath sounds: No wheezing or rhonchi.   Abdominal:      General: Bowel sounds are normal. There is no distension.      Palpations: Abdomen is soft.      Tenderness: There is no abdominal tenderness. There is no guarding.   Musculoskeletal:         General: No swelling.      Cervical back: Normal range of motion and neck supple.      Right lower leg: No  edema.      Left lower leg: No edema.   Skin:     General: Skin is warm and dry.      Capillary Refill: Capillary refill takes less than 2 seconds.      Findings: No bruising, erythema or rash.   Neurological:      General: No focal deficit present.      Mental Status: She is alert and oriented to person, place, and time.      GCS: GCS eye subscore is 4. GCS verbal subscore is 5. GCS motor subscore is 6.   Psychiatric:         Mood and Affect: Mood normal.         Behavior: Behavior normal. Behavior is cooperative.       Significant Labs: A1C: No results for input(s): HGBA1C in the last 4320 hours.  ABGs:   No results for input(s): PH, PCO2, HCO3, POCSATURATED, BE, TOTALHB, COHB, METHB, O2HB, POCFIO2, PO2 in the last 48 hours.    Blood Culture: No results for input(s): LABBLOO in the last 48 hours.  CBC:   No results for input(s): WBC, HGB, HCT, PLT in the last 48 hours.    CMP:   Recent Labs   Lab 04/07/22  0921 04/08/22  1140   * 137   K 5.2* 5.4*   CL 99 104   CO2 23 21*    73   BUN 63* 73*   CREATININE 3.0* 3.3*   CALCIUM 9.4 8.8   PROT 7.4 6.6   ALBUMIN 3.2* 3.2*   BILITOT 0.5 0.6   ALKPHOS 265* 249*   AST 13 17   ALT 10 9*   ANIONGAP 11 12   EGFRNONAA 14* 13*       Lactic Acid: No results for input(s): LACTATE in the last 48 hours.  Lipase: No results for input(s): LIPASE in the last 48 hours.  Lipid Panel: No results for input(s): CHOL, HDL, LDLCALC, TRIG, CHOLHDL in the last 48 hours.  Magnesium:   No results for input(s): MG in the last 48 hours.    Troponin:   No results for input(s): TROPONINI in the last 48 hours.    TSH: No results for input(s): TSH in the last 4320 hours.  Urine Culture: No results for input(s): LABURIN in the last 48 hours.  Urine Studies: No results for input(s): COLORU, APPEARANCEUA, PHUR, SPECGRAV, PROTEINUA, GLUCUA, KETONESU, BILIRUBINUA, OCCULTUA, NITRITE, UROBILINOGEN, LEUKOCYTESUR, RBCUA, WBCUA, BACTERIA, SQUAMEPITHEL, HYALINECASTS in the last 48 hours.    Invalid  input(s): FELIX    Significant Imaging: I have reviewed all pertinent imaging results/findings within the past 24 hours.      Assessment/Plan:      * Clotted renal dialysis arteriovenous graft  S/p thrombectomy on 4/7 and AV grafting not functioning- repeat thrombectomy today 4/8      ESRD (end stage renal disease) on dialysis  -HD MWF; TIGIST fistula + thrill  -Nephrology consulted for HD management        COPD (chronic obstructive pulmonary disease)        Chronic respiratory failure with hypoxia, on home O2 therapy  See above    Acute on chronic respiratory failure with hypoxia  Chronic respiratory failure with hypoxia, on 2L home O2 therapy    Clinically-stable.  Afebrile. No Elevation of WBC, Productive Cough.  --Continue Solumedrol 125 mg IV every 12 hours and switch to PO r  --continue Breo   --DuoNeb breathing Treatments every 4 hours while awake  --PRN Oxygen per Nasal Cannula for SpO2 <88%  --Pulse-Ox Monitoring every 4 hours  --Monitor respiratory status closely  -Continue supplemental oxygen; titrate and wean to maintain SpO2 >88%  -Duo-nebs PRN for SOB/Wheezing  -ABG PRN           VTE Risk Mitigation (From admission, onward)         Ordered     heparin (porcine) injection 5,000 Units  Every 8 hours         04/05/22 1507     IP VTE HIGH RISK PATIENT  Once         04/05/22 1507     Place sequential compression device  Until discontinued         04/05/22 1507                Discharge Planning   LAITH: 4/8/2022     Code Status: Full Code   Is the patient medically ready for discharge?:     Reason for patient still in hospital (select all that apply): Patient trending condition  Discharge Plan A: Home with family   Discharge Delays: None known at this time              Jennifer Bowles MD  Department of Hospital Medicine   [facility]

## 2022-04-09 NOTE — DISCHARGE SUMMARY
[facility]  Discharge Summary      Patient Name: Katie Quevedo  MRN: [id number]  Patient Class: IP- Inpatient  Admission Date: 4/5/2022  Hospital Length of Stay: 4 days  Discharge Date and Time: 4/9/2022 10:48 PM  Attending Physician: Jennifer Bowles*   Discharging Provider: Jennifer Bowles MD  Primary Care Provider: Kingston Verduzco MD      HPI:   Katie Quevedo is 67 y/o female with PMH of COPD, ESRD on HD, hypertension, AVF clot, and CHF presents with shortness of breath. Patient reports her shortness of breath started last night. Patient states it was sudden, was not relieved by her albuterol inhaler. Patient went to dialysis this morning and was accessed but did not receive any dialysis. Per EMS, the patient's SpO2 was in the 70s on their arrival.  The patient received solu-medrol x1, but did not receive any breathing treatments. Patient reports productive cough with yellow sputum production. Patient denies chest pain, headache, dizziness, lightheadedness, abdominal pain, nausea, vomiting, dysuria, hematuria, diarrhea, constipation, black/bloody stools. She reports daily use of her inhalers.  She reports multiple ED visits for breathing issues. History of tobacco use but denies current use of tobacco. Patient will be placed into hospital medicine observation services under my care in collaboration with Dr. Gulshan Goins.         Procedure(s) (LRB):  [id number] (Left)      Hospital Course:   No notes on file     Goals of Care Treatment Preferences:  Code Status: Full Code      Consults:   Consults (From admission, onward)          Status Ordering Provider     Consult to Anesthesiology  Once        Provider:  (Not yet assigned)    Acknowledged BAUTISTA GARCIA     Inpatient consult to Vascular Surgery  Once        Provider:  Bautista Garcia MD    Acknowledged BAUTISTA GARCIA     IP consult to case management  Once        Provider:  (Not yet assigned)     Acknowledged BAUTISTA GARCIA            * Clotted renal dialysis arteriovenous graft  S/p thrombectomy on 4/7 and AV grafting not functioning- repeat thrombectomy today 4/8      ESRD (end stage renal disease) on dialysis  -HD MWF; TIGIST fistula + thrill  -Nephrology consulted for HD management        COPD (chronic obstructive pulmonary disease)        Chronic respiratory failure with hypoxia, on home O2 therapy  See above    Acute on chronic respiratory failure with hypoxia  Chronic respiratory failure with hypoxia, on 2L home O2 therapy    Clinically-stable.  Afebrile. No Elevation of WBC, Productive Cough.  --Continue Solumedrol 125 mg IV every 12 hours and switch to PO r  --continue Breo   --DuoNeb breathing Treatments every 4 hours while awake  --PRN Oxygen per Nasal Cannula for SpO2 <88%  --Pulse-Ox Monitoring every 4 hours  --Monitor respiratory status closely  -Continue supplemental oxygen; titrate and wean to maintain SpO2 >88%  -Duo-nebs PRN for SOB/Wheezing  -ABG PRN           Final Active Diagnoses:    Diagnosis Date Noted POA    PRINCIPAL PROBLEM:  Clotted renal dialysis arteriovenous graft [T82.868A] 02/03/2020 Yes    ESRD (end stage renal disease) on dialysis [N18.6, Z99.2] 02/19/2020 Not Applicable     Chronic    COPD (chronic obstructive pulmonary disease) [J44.9] 12/18/2019 Yes     Chronic    Chronic respiratory failure with hypoxia, on home O2 therapy [J96.11, Z99.81] 07/18/2019 Not Applicable     Chronic    Acute on chronic respiratory failure with hypoxia [J96.21] 03/05/2018 Yes      Problems Resolved During this Admission:       Discharged Condition: stable    Disposition: Home or Self Care    Follow Up:   Follow-up Information       Kingston Verduzco MD Follow up in 1 week(s).    Specialty: Family Medicine  Contact information:  [address]  [address]  [place] [address] [phone]                           Patient Instructions:      WALKER FOR HOME USE     Order Specific Question  "Answer Comments   Type of Walker: Adult (5'4"-6'6")    With wheels? Yes    Height: 5' 2" (1.575 m)    Weight: 38.6 kg (85 lb)    Length of need (1-99 months): 99    Does patient have medical equipment at home? power chair    Does patient have medical equipment at home? cane, straight    Does patient have medical equipment at home? grab bar    Does patient have medical equipment at home? rollator    Does patient have medical equipment at home? oxygen    Please check all that apply: Patient's condition impairs ambulation.    Please check all that apply: Patient needs help to get in and out of chair.    Please check all that apply: Walker will be used for gait training.      COMMODE FOR HOME USE     Order Specific Question Answer Comments   Type: Standard    Height: 5' 2" (1.575 m)    Weight: 38.6 kg (85 lb)    Does patient have medical equipment at home? power chair    Does patient have medical equipment at home? cane, straight    Does patient have medical equipment at home? grab bar    Does patient have medical equipment at home? rollator    Does patient have medical equipment at home? oxygen    Length of need (1-99 months): 99      Diet diabetic     Diet renal     Diet Cardiac     Activity as tolerated         Pending Diagnostic Studies:       None           Medications:  Reconciled Home Medications:      Medication List        START taking these medications      predniSONE 20 MG tablet  Commonly known as: DELTASONE  Take 2 tablets (40 mg total) by mouth once daily. for 4 days            CONTINUE taking these medications      acetaminophen 325 MG tablet  Commonly known as: TYLENOL  Take 1 tablet (325 mg total) by mouth every 6 (six) hours as needed for Pain.     albuterol-ipratropium 2.5 mg-0.5 mg/3 mL nebulizer solution  Commonly known as: DUO-NEB  Take 3 mLs by nebulization every 6 (six) hours as needed for Shortness of Breath. Rescue     allopurinoL 100 MG tablet  Commonly known as: ZYLOPRIM  Take 1 tablet (100 " mg total) by mouth on Tuesday, Thursday, Saturday, and Sunday.     busPIRone 10 MG tablet  Commonly known as: BUSPAR  Take 10 mg by mouth once daily.     clonazePAM 1 MG tablet  Commonly known as: KlonoPIN  TAKE 1 TABLET BY MOUTH 3 TIMES WEEKLY ON DIALYSIS DAYS     diclofenac sodium 1 % Gel  Commonly known as: VOLTAREN  Apply 2 g topically 3 (three) times daily as needed (apply).     diphenhydrAMINE 25 mg capsule  Commonly known as: BENADRYL  Take 25 mg by mouth every 6 (six) hours as needed for Itching.     epoetin hemant-epbx 10,000 unit/mL imjection  Commonly known as: RETACRIT  Inject 0.5 mLs (5,000 Units total) into the skin every Mon, Wed, Fri.     HYDROcodone-acetaminophen  mg per tablet  Commonly known as: NORCO  Take 1 tablet by mouth 3 (three) times daily as needed for Pain.     LIDOcaine-prilocaine cream  Commonly known as: EMLA  Apply topically to left arm prior to dialysis.     mirtazapine 15 MG tablet  Commonly known as: REMERON  Take 1 tablet (15 mg total) by mouth every evening.     LINDA-RADHA 0.8 mg Tab  Generic drug: B complex-vitamin C-folic acid  Take 1 tablet (0.8 mg total) by mouth once daily.     sevelamer carbonate 800 mg Tab  Commonly known as: RENVELA  Take 1 tablet (800 mg total) by mouth after meals as needed.     TRELEGY ELLIPTA 200-62.5-25 mcg inhaler  Generic drug: fluticasone-umeclidin-vilanter  Inhale 1 puff into the lungs once daily.     VENTOLIN HFA 90 mcg/actuation inhaler  Generic drug: albuterol  Inhale 2 puffs into the lungs every 6 (six) hours as needed for Wheezing or Shortness of Breath. Rescue     zolpidem 5 MG Tab  Commonly known as: AMBIEN  Take 5 mg by mouth nightly.            STOP taking these medications      levoFLOXacin 500 MG tablet  Commonly known as: LEVAQUIN              Indwelling Lines/Drains at time of discharge:   Lines/Drains/Airways       Central Venous Catheter Line  Duration                  Hemodialysis AV Graft Left upper arm -- days                     Time spent on the discharge of patient: 35 minutes         Jennifer Bowles MD  Department of [facility]  [place] [facility]

## 2022-04-09 NOTE — PROGRESS NOTES
Progress Note  Nephrology      Consult Requested By: Jennifer Bowles*      SUBJECTIVE:     Overnight events  Patient is a 76 y.o. female     Patient Active Problem List   Diagnosis    Melanoma    Chronic pain    Vitamin deficiency    Cervical post-laminectomy syndrome    Lumbar postlaminectomy syndrome    Lumbosacral spondylosis without myelopathy    Isolated cervical dystonia    Primary osteoarthritis of both knees    Subdeltoid bursitis, L>R.    Other fragments of torsion dystonia    Nuclear sclerosis - Both Eyes    Rotator cuff tear, right    Biceps tendonitis    Osteoarthritis, hip, bilateral    Lumbar radiculopathy, BLE    Cervical radiculopathy, BUE    Osteoporosis    Iron deficiency anemia    Essential hypertension    Atrophy of left kidney    Kidney cysts    History of colon polyps    Acute on chronic respiratory failure with hypoxia    Pleural effusion, left    Pericardial effusion    Shortness of breath    Chronic respiratory failure with hypoxia, on home O2 therapy    Lipoma of torso    Chronic diastolic heart failure    COPD (chronic obstructive pulmonary disease)    Non-intractable vomiting with nausea    Clotted renal dialysis arteriovenous graft    Diastolic dysfunction, left ventricle    Acute congestive heart failure    ESRD (end stage renal disease) on dialysis    Diarrhea    Dialysis AV fistula malfunction, sequela    Medication management    SOB (shortness of breath)    Acute on chronic heart failure    Pleural effusion    Orthopnea    Secondary hyperparathyroidism    Clotted dialysis access     Past Medical History:   Diagnosis Date    Acute congestive heart failure 02/10/2020    Anemia     Bilateral renal cysts     Cataract     Chronic LBP 7/26/2012    Chronic pain     CKD (chronic kidney disease), stage IV     Colon polyp 2013    COPD (chronic obstructive pulmonary disease)     Dehydration     Encounter for blood transfusion      HTN (hypertension)     Lumbar spondylosis     Melanoma     of the lip    Metabolic bone disease     Migraines, neuralgic     Osteoporosis     Primary osteoarthritis of both knees     s/p Rt TKA    Pulmonary embolism with infarction     Seizures 1972    x1 only    Subdeltoid bursitis, L>R. 9/27/2012    Ulcer     Vitamin D deficiency disease               OBJECTIVE:     Vitals:    04/09/22 0750 04/09/22 1101 04/09/22 1116 04/09/22 1130   BP:    (!) 73/38   BP Location:    Right leg   Patient Position:    Lying   Pulse: 78 78 78 79   Resp:   16 18   Temp:    98 °F (36.7 °C)   TempSrc:    Oral   SpO2:   (!) 93% (!) 93%   Weight:       Height:           Temp: 98 °F (36.7 °C) (04/09/22 1130)  Pulse: 79 (04/09/22 1130)  Resp: 18 (04/09/22 1130)  BP: (!) 73/38 (04/09/22 1130)  SpO2: (!) 93 % (04/09/22 1130)              Medications:   albuterol-ipratropium  3 mL Nebulization Q4H WAKE    fluticasone furoate-vilanteroL  1 puff Inhalation Daily    heparin (porcine)  5,000 Units Subcutaneous Q8H    mirtazapine  15 mg Oral QHS    mupirocin   Nasal BID    predniSONE  40 mg Oral Daily    sevelamer carbonate  800 mg Oral TID WM                 Physical Exam:  General appearance:NAD   No SOB  No cough  Lungs: diminished breath sounds  Heart: pulse 79  Abdomen: soft   Extremities: no edema  Skin: dry    Laboratory:  ABG  Labs reviewed  Recent Results (from the past 336 hour(s))   Basic metabolic panel    Collection Time: 04/06/22  5:54 AM   Result Value Ref Range    Sodium 133 (L) 136 - 145 mmol/L    Potassium 4.8 3.5 - 5.1 mmol/L    Chloride 101 95 - 110 mmol/L    CO2 22 (L) 23 - 29 mmol/L    BUN 22 8 - 23 mg/dL    Creatinine 1.7 (H) 0.5 - 1.4 mg/dL    Calcium 9.4 8.7 - 10.5 mg/dL    Anion Gap 10 8 - 16 mmol/L     Recent Results (from the past 336 hour(s))   CBC auto differential    Collection Time: 04/06/22  5:54 AM   Result Value Ref Range    WBC 8.48 3.90 - 12.70 K/uL    Hemoglobin 11.8 (L) 12.0 - 16.0 g/dL     Hematocrit 38.1 37.0 - 48.5 %    Platelets 171 150 - 450 K/uL   CBC auto differential    Collection Time: 04/05/22  1:23 PM   Result Value Ref Range    WBC 11.24 3.90 - 12.70 K/uL    Hemoglobin 12.3 12.0 - 16.0 g/dL    Hematocrit 40.1 37.0 - 48.5 %    Platelets 218 150 - 450 K/uL     Urinalysis  No results for input(s): COLORU, CLARITYU, SPECGRAV, PHUR, PROTEINUA, GLUCOSEU, BILIRUBINCON, BLOODU, WBCU, RBCU, BACTERIA, MUCUS, NITRITE, LEUKOCYTESUR, UROBILINOGEN, HYALINECASTS in the last 24 hours.    Diagnostic Results:  X-Ray: Reviewed  US: Reviewed  Echo: Reviewed  ACCESS    ASSESSMENT/PLAN:     ESRD  K 5.4  On 4/8 - [facility] once   Metabolic bone disease  Anemia multifactorial   Hb 11.8  Poor nutrition  Renal diet   Supplements  Hypotension  Midodrine 2.5 mg once  Dialysis/UF 1 liter yesterday

## 2022-04-10 NOTE — NURSING
The patient is being discharged with  and personal belongings at bedside via private vehicle. The discharge instructions were discussed with the patient who verbalized an understanding of information.

## 2022-04-10 NOTE — ANESTHESIA POSTPROCEDURE EVALUATION
Anesthesia Post Evaluation    Patient: Katie Quevedo    Procedure(s) Performed: Procedure(s) (LRB):  [id number] (Left)    Final Anesthesia Type: MAC      Patient location during evaluation: OPS  Patient participation: Yes- Able to Participate  Level of consciousness: awake and alert  Post-procedure vital signs: reviewed and stable  Pain management: adequate  Airway patency: patent    PONV status at discharge: No PONV  Anesthetic complications: no      Cardiovascular status: blood pressure returned to baseline and hemodynamically stable  Respiratory status: unassisted  Hydration status: euvolemic  Follow-up not needed.          Vitals Value Taken Time   /51 04/09/22 1908   Temp 36.6 °C (97.8 °F) 04/09/22 1908   Pulse 88 04/09/22 1953   Resp 18 04/09/22 1953   SpO2 98 % 04/09/22 1953         No case tracking events are documented in the log.      Pain/Nelson Score: No data recorded

## 2022-04-11 ENCOUNTER — PATIENT OUTREACH (OUTPATIENT)
Dept: ADMINISTRATIVE | Facility: CLINIC | Age: 77
End: 2022-04-11
Payer: MEDICARE

## 2022-04-11 DIAGNOSIS — J43.2 CENTRILOBULAR EMPHYSEMA: Chronic | ICD-10-CM

## 2022-04-11 RX ORDER — ALBUTEROL SULFATE 90 UG/1
2 AEROSOL, METERED RESPIRATORY (INHALATION) EVERY 6 HOURS PRN
Qty: 18 G | Refills: 3 | Status: SHIPPED | OUTPATIENT
Start: 2022-04-11 | End: 2022-11-01 | Stop reason: SDUPTHER

## 2022-04-11 NOTE — PROGRESS NOTES
C3 nurse spoke with Katie Quevedo for a T[facility] post hospital discharge follow up call. The patient has a scheduled [facility] appointment with Kingston Verduzco MD on 4/13/2022 @ 3498.

## 2022-04-12 PROCEDURE — G0180 PR HOME HEALTH MD CERTIFICATION: ICD-10-PCS | Mod: ,,, | Performed by: FAMILY MEDICINE

## 2022-04-12 PROCEDURE — G0180 MD CERTIFICATION HHA PATIENT: HCPCS | Mod: ,,, | Performed by: FAMILY MEDICINE

## 2022-04-14 ENCOUNTER — PATIENT OUTREACH (OUTPATIENT)
Dept: ADMINISTRATIVE | Facility: OTHER | Age: 77
End: 2022-04-14
Payer: MEDICARE

## 2022-04-15 ENCOUNTER — HOSPITAL ENCOUNTER (OUTPATIENT)
Facility: HOSPITAL | Age: 77
Discharge: HOME | End: 2022-04-16
Attending: EMERGENCY MEDICINE | Admitting: HOSPITALIST
Payer: MEDICARE

## 2022-04-15 DIAGNOSIS — J90 PLEURAL EFFUSION: ICD-10-CM

## 2022-04-15 DIAGNOSIS — N18.6 ESRD ON HEMODIALYSIS: ICD-10-CM

## 2022-04-15 DIAGNOSIS — T82.49XD CLOTTED DIALYSIS ACCESS, SUBSEQUENT ENCOUNTER: Primary | ICD-10-CM

## 2022-04-15 DIAGNOSIS — I10 ESSENTIAL HYPERTENSION: Chronic | ICD-10-CM

## 2022-04-15 DIAGNOSIS — J43.2 CENTRILOBULAR EMPHYSEMA: ICD-10-CM

## 2022-04-15 DIAGNOSIS — T82.868A CLOTTED RENAL DIALYSIS ARTERIOVENOUS GRAFT: ICD-10-CM

## 2022-04-15 DIAGNOSIS — Z99.2 ESRD ON HEMODIALYSIS: ICD-10-CM

## 2022-04-15 DIAGNOSIS — Z86.010 HISTORY OF COLON POLYPS: ICD-10-CM

## 2022-04-15 DIAGNOSIS — Z99.2 DIALYSIS PATIENT: ICD-10-CM

## 2022-04-15 DIAGNOSIS — Z99.2 ESRD (END STAGE RENAL DISEASE) ON DIALYSIS: Chronic | ICD-10-CM

## 2022-04-15 DIAGNOSIS — T82.49XA CLOTTED DIALYSIS ACCESS, INITIAL ENCOUNTER: ICD-10-CM

## 2022-04-15 DIAGNOSIS — N18.6 ESRD (END STAGE RENAL DISEASE) ON DIALYSIS: Chronic | ICD-10-CM

## 2022-04-15 DIAGNOSIS — R07.9 CHEST PAIN: ICD-10-CM

## 2022-04-15 DIAGNOSIS — T82.868A THROMBOSIS OF ARTERIOVENOUS FISTULA, INITIAL ENCOUNTER: ICD-10-CM

## 2022-04-15 LAB
ALBUMIN SERPL BCP-MCNC: 3.8 G/DL (ref 3.5–5.2)
ALP SERPL-CCNC: 191 U/L (ref 55–135)
ALT SERPL W/O P-5'-P-CCNC: 20 U/L (ref 10–44)
ANION GAP SERPL CALC-SCNC: 13 MMOL/L (ref 8–16)
AST SERPL-CCNC: 18 U/L (ref 10–40)
BILIRUB SERPL-MCNC: 0.6 MG/DL (ref 0.1–1)
BUN SERPL-MCNC: 34 MG/DL (ref 8–23)
CALCIUM SERPL-MCNC: 9.5 MG/DL (ref 8.7–10.5)
CHLORIDE SERPL-SCNC: 100 MMOL/L (ref 95–110)
CO2 SERPL-SCNC: 26 MMOL/L (ref 23–29)
CREAT SERPL-MCNC: 2.2 MG/DL (ref 0.5–1.4)
ERYTHROCYTE [DISTWIDTH] IN BLOOD BY AUTOMATED COUNT: 14 % (ref 11.5–14.5)
EST. GFR  (AFRICAN AMERICAN): 24 ML/MIN/1.73 M^2
EST. GFR  (NON AFRICAN AMERICAN): 21 ML/MIN/1.73 M^2
GLUCOSE SERPL-MCNC: 69 MG/DL (ref 70–110)
HCT VFR BLD AUTO: 34.9 % (ref 37–48.5)
HGB BLD-MCNC: 10.8 G/DL (ref 12–16)
MCH RBC QN AUTO: 30.6 PG (ref 27–31)
MCHC RBC AUTO-ENTMCNC: 30.9 G/DL (ref 32–36)
MCV RBC AUTO: 99 FL (ref 82–98)
PLATELET # BLD AUTO: 300 K/UL (ref 150–450)
PMV BLD AUTO: 9.6 FL (ref 9.2–12.9)
POTASSIUM SERPL-SCNC: 3.9 MMOL/L (ref 3.5–5.1)
PROT SERPL-MCNC: 7.8 G/DL (ref 6–8.4)
RBC # BLD AUTO: 3.53 M/UL (ref 4–5.4)
SODIUM SERPL-SCNC: 139 MMOL/L (ref 136–145)
WBC # BLD AUTO: 11.89 K/UL (ref 3.9–12.7)

## 2022-04-15 PROCEDURE — 27000221 HC OXYGEN, UP TO 24 HOURS

## 2022-04-15 PROCEDURE — 93010 ELECTROCARDIOGRAM REPORT: CPT | Mod: ,,, | Performed by: INTERNAL MEDICINE

## 2022-04-15 PROCEDURE — 80053 COMPREHEN METABOLIC PANEL: CPT | Performed by: EMERGENCY MEDICINE

## 2022-04-15 PROCEDURE — 25000003 PHARM REV CODE 250: Performed by: ORTHOPAEDIC SURGERY

## 2022-04-15 PROCEDURE — 96372 THER/PROPH/DIAG INJ SC/IM: CPT | Performed by: ORTHOPAEDIC SURGERY

## 2022-04-15 PROCEDURE — 63600175 PHARM REV CODE 636 W HCPCS: Mod: JG | Performed by: ORTHOPAEDIC SURGERY

## 2022-04-15 PROCEDURE — 99285 EMERGENCY DEPT VISIT HI MDM: CPT | Mod: 25

## 2022-04-15 PROCEDURE — G0378 HOSPITAL OBSERVATION PER HR: HCPCS

## 2022-04-15 PROCEDURE — 93010 EKG 12-LEAD: ICD-10-PCS | Mod: ,,, | Performed by: INTERNAL MEDICINE

## 2022-04-15 PROCEDURE — 99900035 HC TECH TIME PER 15 MIN (STAT)

## 2022-04-15 PROCEDURE — 85027 COMPLETE CBC AUTOMATED: CPT | Performed by: EMERGENCY MEDICINE

## 2022-04-15 PROCEDURE — 94760 N-INVAS EAR/PLS OXIMETRY 1: CPT

## 2022-04-15 PROCEDURE — 93005 ELECTROCARDIOGRAM TRACING: CPT

## 2022-04-15 RX ORDER — GLUCAGON 1 MG
1 KIT INJECTION
Status: DISCONTINUED | OUTPATIENT
Start: 2022-04-15 | End: 2022-04-17 | Stop reason: HOSPADM

## 2022-04-15 RX ORDER — HYDROCODONE BITARTRATE AND ACETAMINOPHEN 10; 325 MG/1; MG/1
1 TABLET ORAL EVERY 8 HOURS PRN
Status: DISCONTINUED | OUTPATIENT
Start: 2022-04-15 | End: 2022-04-17 | Stop reason: HOSPADM

## 2022-04-15 RX ORDER — ACETAMINOPHEN 325 MG/1
325 TABLET ORAL EVERY 6 HOURS PRN
Status: DISCONTINUED | OUTPATIENT
Start: 2022-04-15 | End: 2022-04-17 | Stop reason: HOSPADM

## 2022-04-15 RX ORDER — LANOLIN ALCOHOL/MO/W.PET/CERES
800 CREAM (GRAM) TOPICAL
Status: DISCONTINUED | OUTPATIENT
Start: 2022-04-15 | End: 2022-04-17 | Stop reason: HOSPADM

## 2022-04-15 RX ORDER — SEVELAMER CARBONATE 800 MG/1
800 TABLET, FILM COATED ORAL
Status: DISCONTINUED | OUTPATIENT
Start: 2022-04-15 | End: 2022-04-17 | Stop reason: HOSPADM

## 2022-04-15 RX ORDER — ONDANSETRON 8 MG/1
8 TABLET, ORALLY DISINTEGRATING ORAL EVERY 8 HOURS PRN
Status: DISCONTINUED | OUTPATIENT
Start: 2022-04-15 | End: 2022-04-17 | Stop reason: HOSPADM

## 2022-04-15 RX ORDER — ALBUTEROL SULFATE 90 UG/1
2 AEROSOL, METERED RESPIRATORY (INHALATION) EVERY 6 HOURS PRN
Status: DISCONTINUED | OUTPATIENT
Start: 2022-04-15 | End: 2022-04-17 | Stop reason: HOSPADM

## 2022-04-15 RX ORDER — MIRTAZAPINE 15 MG/1
15 TABLET, ORALLY DISINTEGRATING ORAL NIGHTLY
Status: DISCONTINUED | OUTPATIENT
Start: 2022-04-15 | End: 2022-04-17 | Stop reason: HOSPADM

## 2022-04-15 RX ORDER — NALOXONE HCL 0.4 MG/ML
0.02 VIAL (ML) INJECTION
Status: DISCONTINUED | OUTPATIENT
Start: 2022-04-15 | End: 2022-04-17 | Stop reason: HOSPADM

## 2022-04-15 RX ORDER — IPRATROPIUM BROMIDE AND ALBUTEROL SULFATE 2.5; .5 MG/3ML; MG/3ML
3 SOLUTION RESPIRATORY (INHALATION) EVERY 6 HOURS PRN
Status: DISCONTINUED | OUTPATIENT
Start: 2022-04-15 | End: 2022-04-17 | Stop reason: HOSPADM

## 2022-04-15 RX ORDER — SODIUM CHLORIDE 9 MG/ML
INJECTION, SOLUTION INTRAVENOUS CONTINUOUS
Status: CANCELLED | OUTPATIENT
Start: 2022-04-15

## 2022-04-15 RX ORDER — FLUTICASONE FUROATE AND VILANTEROL 100; 25 UG/1; UG/1
1 POWDER RESPIRATORY (INHALATION) DAILY
Status: DISCONTINUED | OUTPATIENT
Start: 2022-04-16 | End: 2022-04-17 | Stop reason: HOSPADM

## 2022-04-15 RX ORDER — MUPIROCIN 20 MG/G
OINTMENT TOPICAL
Status: CANCELLED | OUTPATIENT
Start: 2022-04-15

## 2022-04-15 RX ORDER — SODIUM CHLORIDE 0.9 % (FLUSH) 0.9 %
10 SYRINGE (ML) INJECTION EVERY 12 HOURS PRN
Status: DISCONTINUED | OUTPATIENT
Start: 2022-04-15 | End: 2022-04-17 | Stop reason: HOSPADM

## 2022-04-15 RX ORDER — IBUPROFEN 200 MG
24 TABLET ORAL
Status: DISCONTINUED | OUTPATIENT
Start: 2022-04-15 | End: 2022-04-17 | Stop reason: HOSPADM

## 2022-04-15 RX ORDER — CIPROFLOXACIN 250 MG/1
250 TABLET, FILM COATED ORAL DAILY
Status: DISCONTINUED | OUTPATIENT
Start: 2022-04-15 | End: 2022-04-17 | Stop reason: HOSPADM

## 2022-04-15 RX ORDER — MAG HYDROX/ALUMINUM HYD/SIMETH 200-200-20
30 SUSPENSION, ORAL (FINAL DOSE FORM) ORAL 4 TIMES DAILY PRN
Status: DISCONTINUED | OUTPATIENT
Start: 2022-04-15 | End: 2022-04-17 | Stop reason: HOSPADM

## 2022-04-15 RX ORDER — AMOXICILLIN 250 MG
1 CAPSULE ORAL 2 TIMES DAILY
Status: DISCONTINUED | OUTPATIENT
Start: 2022-04-15 | End: 2022-04-17 | Stop reason: HOSPADM

## 2022-04-15 RX ORDER — IBUPROFEN 200 MG
16 TABLET ORAL
Status: DISCONTINUED | OUTPATIENT
Start: 2022-04-15 | End: 2022-04-17 | Stop reason: HOSPADM

## 2022-04-15 RX ORDER — HEPARIN SODIUM 5000 [USP'U]/ML
5000 INJECTION, SOLUTION INTRAVENOUS; SUBCUTANEOUS EVERY 8 HOURS
Status: DISPENSED | OUTPATIENT
Start: 2022-04-15 | End: 2022-04-16

## 2022-04-15 RX ADMIN — MIRTAZAPINE 15 MG: 15 TABLET, ORALLY DISINTEGRATING ORAL at 09:04

## 2022-04-15 RX ADMIN — HYDROCODONE BITARTRATE AND ACETAMINOPHEN 1 TABLET: 10; 325 TABLET ORAL at 05:04

## 2022-04-15 RX ADMIN — HEPARIN SODIUM 5000 UNITS: 5000 INJECTION INTRAVENOUS; SUBCUTANEOUS at 09:04

## 2022-04-15 RX ADMIN — SEVELAMER CARBONATE 800 MG: 800 TABLET, FILM COATED ORAL at 05:04

## 2022-04-15 RX ADMIN — ALUMINUM HYDROXIDE, MAGNESIUM HYDROXIDE, AND SIMETHICONE 30 ML: 200; 200; 20 SUSPENSION ORAL at 09:04

## 2022-04-15 RX ADMIN — CIPROFLOXACIN HYDROCHLORIDE 250 MG: 250 TABLET, FILM COATED ORAL at 05:04

## 2022-04-15 RX ADMIN — EPOETIN ALFA-EPBX 5000 UNITS: 3000 INJECTION, SOLUTION INTRAVENOUS; SUBCUTANEOUS at 05:04

## 2022-04-15 NOTE — SUBJECTIVE & OBJECTIVE
Past Medical History:   Diagnosis Date    Acute congestive heart failure 02/10/2020    Anemia     Bilateral renal cysts     Cataract     Chronic LBP 7/26/2012    Chronic pain     CKD (chronic kidney disease), stage IV     Colon polyp 2013    COPD (chronic obstructive pulmonary disease)     Dehydration     Encounter for blood transfusion     HTN (hypertension)     Lumbar spondylosis     Melanoma     of the lip    Metabolic bone disease     Migraines, neuralgic     Osteoporosis     Primary osteoarthritis of both knees     s/p Rt TKA    Pulmonary embolism with infarction     Seizures 1972    x1 only    Subdeltoid bursitis, L>R. 9/27/2012    Ulcer     Vitamin D deficiency disease        Past Surgical History:   Procedure Laterality Date    BLADDER SUSPENSION      CATARACT EXTRACTION  11/18/13    left eye    CERVICAL LAMINECTOMY      x3, fusion x1    COLONOSCOPY  2009    DECLOTTING OF ARTERIOVENOUS GRAFT Left 1/3/2022    Procedure: [id number];  Surgeon: Lindsey Louie MD;  Location: [facility] [place] OR;  Service: Vascular;  Laterality: Left;    DECLOTTING OF ARTERIOVENOUS GRAFT Left 2/8/2022    Procedure: [id number];  Surgeon: Lindsey Louie MD;  Location: [facility] [place] OR;  Service: Vascular;  Laterality: Left;    DECLOTTING OF ARTERIOVENOUS GRAFT Left 4/8/2022    Procedure: [id number];  Surgeon: Lindsey Louie MD;  Location: [facility] [place] OR;  Service: Vascular;  Laterality: Left;    FISTULOGRAM N/A 2/27/2020    Procedure: Fistulogram;  Surgeon: Noe Benitez MD;  Location: [facility] [place] CATH LAB/EP;  Service: Cardiology;  Laterality: N/A;    HYSTERECTOMY      JOINT REPLACEMENT  2001    total right knee     LUMBAR LAMINECTOMY      x 3, fusion x1    OOPHORECTOMY      PERIPHERAL ANGIOGRAPHY N/A 3/9/2020    Procedure: Peripheral angiography;  Surgeon: Jacinto Henriquez MD;  Location: [facility] [place] CATH LAB/EP;  Service: Cardiology;  Laterality: N/A;    PLACEMENT OF ARTERIOVENOUS GRAFT Left 1/21/2020    Procedure:  INSERTION, GRAFT, ARTERIOVENOUS;  Surgeon: Lindsey Louie MD;  Location: [facility] OR;  Service: General;  Laterality: Left;    THROMBECTOMY Left 2/3/2020    Procedure: THROMBECTOMY;  Surgeon: Lindsey Louie MD;  Location: [facility] OR;  Service: General;  Laterality: Left;    THROMBECTOMY  3/9/2020    Procedure: Thrombectomy;  Surgeon: Jacinto Henriquez MD;  Location: [facility] CATH LAB/EP;  Service: Cardiology;;    THROMBECTOMY Left 4/7/2022    Procedure: THROMBECTOMY;  Surgeon: Lindsey Louie MD;  Location: [facility] OR;  Service: General;  Laterality: Left;       Review of patient's allergies indicates:   Allergen Reactions    Aspirin      Other reaction(s): hx of ulcers    Tetracycline Swelling     Other reaction(s): Swelling    Penicillins Rash     Other reaction(s): Hives  Other reaction(s): Rash  Other reaction(s): Rash  Other reaction(s): Hives       No current facility-administered medications on file prior to encounter.     Current Outpatient Medications on File Prior to Encounter   Medication Sig    acetaminophen (TYLENOL) 325 MG tablet Take 1 tablet (325 mg total) by mouth every 6 (six) hours as needed for Pain.    albuterol (VENTOLIN HFA) 90 mcg/actuation inhaler Inhale 2 puffs into the lungs every 6 (six) hours as needed for Wheezing or Shortness of Breath. Rescue    albuterol-ipratropium (DUO-NEB) 2.5 mg-0.5 mg/3 mL nebulizer solution Take 3 mLs by nebulization every 6 (six) hours as needed for Shortness of Breath. Rescue    ciprofloxacin HCl (CIPRO) 250 MG tablet Take 1 tablet by mouth every day for 7 days    diphenhydrAMINE (BENADRYL) 25 mg capsule Take 25 mg by mouth every 6 (six) hours as needed for Itching.    epoetin hemant-epbx (RETACRIT) 10,000 unit/mL imjection Inject 0.5 mLs (5,000 Units total) into the skin every Mon, Wed, Fri.    fluticasone-umeclidin-vilanter (TRELEGY ELLIPTA) 200-62.5-25 mcg inhaler Inhale 1 puff into the lungs once daily.    HYDROcodone-acetaminophen (NORCO)  mg per  tablet Take 1 tablet by mouth 3 (three) times daily as needed for Pain.    mirtazapine (REMERON) 15 MG tablet Take 1 tablet (15 mg total) by mouth every evening.    sevelamer carbonate (RENVELA) 800 mg Tab Take 1 tablet (800 mg total) by mouth after meals as needed.    allopurinoL (ZYLOPRIM) 100 MG tablet Take 1 tablet (100 mg total) by mouth on Tuesday, Thursday, Saturday, and Sunday.    B complex-vitamin C-folic acid (LINDA-RADHA) 0.8 mg Tab Take 1 tablet (0.8 mg total) by mouth once daily. (Patient not taking: No sig reported)    busPIRone (BUSPAR) 10 MG tablet Take 10 mg by mouth once daily.    clonazePAM (KLONOPIN) 1 MG tablet TAKE 1 TABLET BY MOUTH 3 TIMES WEEKLY ON DIALYSIS DAYS    diclofenac sodium (VOLTAREN) 1 % Gel Apply 2 g topically 3 (three) times daily as needed (apply).    lidocaine-prilocaine (EMLA) cream Apply topically to left arm prior to dialysis. (Patient not taking: No sig reported)    zolpidem (AMBIEN) 5 MG Tab Take 5 mg by mouth nightly.     Family History       Problem Relation (Age of Onset)    Arthritis Mother    Cancer Father    Cataracts Father, Sister    Diabetes Maternal Aunt    Glaucoma Cousin    Heart attack Maternal Grandfather    Heart disease Maternal Grandfather    Hypertension Father, Maternal Grandfather    Stroke Mother          Tobacco Use    Smoking status: Former Smoker     Types: Cigarettes    Smokeless tobacco: Former User     Quit date: 2/3/2015   Substance and Sexual Activity    Alcohol use: Not Currently     Comment: Rare    Drug use: No    Sexual activity: Never     Partners: Male     Review of Systems   Constitutional:  Negative for chills and fever.   Respiratory:  Positive for cough (chronic). Negative for chest tightness and shortness of breath.    Cardiovascular:  Negative for chest pain, palpitations and leg swelling.   Gastrointestinal:  Negative for abdominal pain and nausea.   Musculoskeletal:  Negative for arthralgias (chronic).   Skin:  Positive for wound  (surgical wound at fistula site with sutures in place).   Neurological:  Negative for dizziness, syncope, weakness, light-headedness and headaches.   Psychiatric/Behavioral:  Negative for agitation, behavioral problems and confusion.    Objective:     Vital Signs (Most Recent):  Temp: 97.9 °F (36.6 °C) (04/15/22 1300)  Pulse: 85 (04/15/22 1300)  Resp: 19 (04/15/22 1300)  BP: (!) 118/59 (04/15/22 1300)  SpO2: 100 % (04/15/22 1300)   Vital Signs (24h Range):  Temp:  [97.9 °F (36.6 °C)] 97.9 °F (36.6 °C)  Pulse:  [85] 85  Resp:  [19] 19  SpO2:  [100 %] 100 %  BP: (118)/(59) 118/59     Weight: 38.6 kg (85 lb)  Body mass index is 15.55 kg/m².    Physical Exam  Vitals reviewed.   Constitutional:       Appearance: Normal appearance. She is not ill-appearing.   HENT:      Head: Normocephalic and atraumatic.   Eyes:      Pupils: Pupils are equal, round, and reactive to light.   Cardiovascular:      Rate and Rhythm: Normal rate and regular rhythm.   Pulmonary:      Breath sounds: Normal breath sounds.      Comments: decreased intake volume  Abdominal:      General: There is no distension.      Palpations: Abdomen is soft.      Tenderness: There is no abdominal tenderness.   Musculoskeletal:      Right lower leg: No edema.      Left lower leg: No edema.   Skin:     General: Skin is warm and dry.      Comments: AV fistula noted to left UE; no palpable thrill. Sutures in place, wound margins well approximated, no sign of infection, moderate surrounding ecchymosis    Neurological:      General: No focal deficit present.      Mental Status: She is alert and oriented to person, place, and time.   Psychiatric:         Mood and Affect: Mood normal.         Behavior: Behavior normal.         CRANIAL NERVES     CN III, IV, VI   Pupils are equal, round, and reactive to light.     Significant Labs: All pertinent labs within the past 24 hours have been reviewed.  CBC:   Recent Labs   Lab 04/15/22  1338   WBC 11.89   HGB 10.8*   HCT 34.9*         CMP:   Recent Labs   Lab 04/15/22  1338      K 3.9      CO2 26   GLU 69*   BUN 34*   CREATININE 2.2*   CALCIUM 9.5   PROT 7.8   ALBUMIN 3.8   BILITOT 0.6   ALKPHOS 191*   AST 18   ALT 20   ANIONGAP 13   EGFRNONAA 21*       Significant Imaging: I have reviewed all pertinent imaging results/findings within the past 24 hours.  I have reviewed and interpreted all pertinent imaging results/findings within the past 24 hours.

## 2022-04-15 NOTE — ASSESSMENT & PLAN NOTE
- chronic, stable  - continue home inhales and nebulizer  - continue supplemental O2 at 2L NC for SpO2 >88%

## 2022-04-15 NOTE — H&P
[facility] Medicine  History & Physical    Patient Name: Katie Quevedo  MRN: [id number]  Patient Class: OP- Observation  Admission Date: 4/15/2022  Attending Physician: Rosa Del Toro MD  Primary Care Provider: Kingston Verduzco MD         Patient information was obtained from patient, spouse/SO and ER records.     Subjective:     Principal Problem:Clotted renal dialysis arteriovenous graft    Chief Complaint:   Chief Complaint   Patient presents with    Hemodialysis Access     Pt presents to ED today reports she was unable to received dialysis today due to clotted fistula pt reports she was last received dialysis on Wednesday. Pt arrives on 2L home O2         HPI: Katie Quevedo is a 76 y.o. female with PMHx significant for CHF, COPD w/ chronic respiratory failure on home O2, ESRD on HD (MWF), and h/o recurrent clotted AV fistula. Her last dialysis was Wednesday, she went for HD today but was unable to access her fistula. She was recently admitted for a similar issue and underwent declotting thrombectomy on 04/07/22 and 04/08/22 by Dr. Louie. She denies any other complaints today; specifically denies pain, CP, SOB, weakness, headache, nausea, vomiting, cramping, numbness. On presentation, vital signs stable. Laboratory studies without electrolyte abnormalities. EKG unchanged from prior. Discussed case with Dr. Louie and Dr. Diggs. She will be admitted under observation to  service. Plans for dialysis catheter placement and HD tomorrow.       Past Medical History:   Diagnosis Date    Acute congestive heart failure 02/10/2020    Anemia     Bilateral renal cysts     Cataract     Chronic LBP 7/26/2012    Chronic pain     CKD (chronic kidney disease), stage IV     Colon polyp 2013    COPD (chronic obstructive pulmonary disease)     Dehydration     Encounter for blood transfusion     HTN (hypertension)     Lumbar spondylosis     Melanoma     of the lip    Metabolic bone disease      Migraines, neuralgic     Osteoporosis     Primary osteoarthritis of both knees     s/p Rt TKA    Pulmonary embolism with infarction     Seizures 1972    x1 only    Subdeltoid bursitis, L>R. 9/27/2012    Ulcer     Vitamin D deficiency disease        Past Surgical History:   Procedure Laterality Date    BLADDER SUSPENSION      CATARACT EXTRACTION  11/18/13    left eye    CERVICAL LAMINECTOMY      x3, fusion x1    COLONOSCOPY  2009    DECLOTTING OF ARTERIOVENOUS GRAFT Left 1/3/2022    Procedure: [id number];  Surgeon: Lindsey Louie MD;  Location: [facility] [place] OR;  Service: Vascular;  Laterality: Left;    DECLOTTING OF ARTERIOVENOUS GRAFT Left 2/8/2022    Procedure: [id number];  Surgeon: Lindsey Louie MD;  Location: [facility] [place] OR;  Service: Vascular;  Laterality: Left;    DECLOTTING OF ARTERIOVENOUS GRAFT Left 4/8/2022    Procedure: [id number];  Surgeon: Lindsey Louie MD;  Location: [facility] [place] OR;  Service: Vascular;  Laterality: Left;    FISTULOGRAM N/A 2/27/2020    Procedure: Fistulogram;  Surgeon: Noe Benitez MD;  Location: [facility] [place] CATH LAB/EP;  Service: Cardiology;  Laterality: N/A;    HYSTERECTOMY      JOINT REPLACEMENT  2001    total right knee     LUMBAR LAMINECTOMY      x 3, fusion x1    OOPHORECTOMY      PERIPHERAL ANGIOGRAPHY N/A 3/9/2020    Procedure: Peripheral angiography;  Surgeon: Jacinto Henriquez MD;  Location: [facility] [place] CATH LAB/EP;  Service: Cardiology;  Laterality: N/A;    PLACEMENT OF ARTERIOVENOUS GRAFT Left 1/21/2020    Procedure: INSERTION, GRAFT, ARTERIOVENOUS;  Surgeon: Lindsey Louie MD;  Location: [facility] [place] OR;  Service: General;  Laterality: Left;    THROMBECTOMY Left 2/3/2020    Procedure: THROMBECTOMY;  Surgeon: Lindsey Louie MD;  Location: [facility] [place] OR;  Service: General;  Laterality: Left;    THROMBECTOMY  3/9/2020    Procedure: Thrombectomy;  Surgeon: Jacinto Henriquez MD;  Location: [facility] [place] CATH LAB/EP;  Service: Cardiology;;     THROMBECTOMY Left 4/7/2022    Procedure: THROMBECTOMY;  Surgeon: Lindsey Louie MD;  Location: [facility] OR;  Service: General;  Laterality: Left;       Review of patient's allergies indicates:   Allergen Reactions    Aspirin      Other reaction(s): hx of ulcers    Tetracycline Swelling     Other reaction(s): Swelling    Penicillins Rash     Other reaction(s): Hives  Other reaction(s): Rash  Other reaction(s): Rash  Other reaction(s): Hives       No current facility-administered medications on file prior to encounter.     Current Outpatient Medications on File Prior to Encounter   Medication Sig    acetaminophen (TYLENOL) 325 MG tablet Take 1 tablet (325 mg total) by mouth every 6 (six) hours as needed for Pain.    albuterol (VENTOLIN HFA) 90 mcg/actuation inhaler Inhale 2 puffs into the lungs every 6 (six) hours as needed for Wheezing or Shortness of Breath. Rescue    albuterol-ipratropium (DUO-NEB) 2.5 mg-0.5 mg/3 mL nebulizer solution Take 3 mLs by nebulization every 6 (six) hours as needed for Shortness of Breath. Rescue    ciprofloxacin HCl (CIPRO) 250 MG tablet Take 1 tablet by mouth every day for 7 days    diphenhydrAMINE (BENADRYL) 25 mg capsule Take 25 mg by mouth every 6 (six) hours as needed for Itching.    epoetin hemant-epbx (RETACRIT) 10,000 unit/mL imjection Inject 0.5 mLs (5,000 Units total) into the skin every Mon, Wed, Fri.    fluticasone-umeclidin-vilanter (TRELEGY ELLIPTA) 200-62.5-25 mcg inhaler Inhale 1 puff into the lungs once daily.    HYDROcodone-acetaminophen (NORCO)  mg per tablet Take 1 tablet by mouth 3 (three) times daily as needed for Pain.    mirtazapine (REMERON) 15 MG tablet Take 1 tablet (15 mg total) by mouth every evening.    sevelamer carbonate (RENVELA) 800 mg Tab Take 1 tablet (800 mg total) by mouth after meals as needed.    allopurinoL (ZYLOPRIM) 100 MG tablet Take 1 tablet (100 mg total) by mouth on Tuesday, Thursday, Saturday, and Sunday.    B  complex-vitamin C-folic acid (LINDA-RADHA) 0.8 mg Tab Take 1 tablet (0.8 mg total) by mouth once daily. (Patient not taking: No sig reported)    busPIRone (BUSPAR) 10 MG tablet Take 10 mg by mouth once daily.    clonazePAM (KLONOPIN) 1 MG tablet TAKE 1 TABLET BY MOUTH 3 TIMES WEEKLY ON DIALYSIS DAYS    diclofenac sodium (VOLTAREN) 1 % Gel Apply 2 g topically 3 (three) times daily as needed (apply).    lidocaine-prilocaine (EMLA) cream Apply topically to left arm prior to dialysis. (Patient not taking: No sig reported)    zolpidem (AMBIEN) 5 MG Tab Take 5 mg by mouth nightly.     Family History       Problem Relation (Age of Onset)    Arthritis Mother    Cancer Father    Cataracts Father, Sister    Diabetes Maternal Aunt    Glaucoma Cousin    Heart attack Maternal Grandfather    Heart disease Maternal Grandfather    Hypertension Father, Maternal Grandfather    Stroke Mother          Tobacco Use    Smoking status: Former Smoker     Types: Cigarettes    Smokeless tobacco: Former User     Quit date: 2/3/2015   Substance and Sexual Activity    Alcohol use: Not Currently     Comment: Rare    Drug use: No    Sexual activity: Never     Partners: Male     Review of Systems   Constitutional:  Negative for chills and fever.   Respiratory:  Positive for cough (chronic). Negative for chest tightness and shortness of breath.    Cardiovascular:  Negative for chest pain, palpitations and leg swelling.   Gastrointestinal:  Negative for abdominal pain and nausea.   Musculoskeletal:  Negative for arthralgias (chronic).   Skin:  Positive for wound (surgical wound at fistula site with sutures in place).   Neurological:  Negative for dizziness, syncope, weakness, light-headedness and headaches.   Psychiatric/Behavioral:  Negative for agitation, behavioral problems and confusion.    Objective:     Vital Signs (Most Recent):  Temp: 97.9 °F (36.6 °C) (04/15/22 1300)  Pulse: 85 (04/15/22 1300)  Resp: 19 (04/15/22 1300)  BP: (!)  118/59 (04/15/22 1300)  SpO2: 100 % (04/15/22 1300)   Vital Signs (24h Range):  Temp:  [97.9 °F (36.6 °C)] 97.9 °F (36.6 °C)  Pulse:  [85] 85  Resp:  [19] 19  SpO2:  [100 %] 100 %  BP: (118)/(59) 118/59     Weight: 38.6 kg (85 lb)  Body mass index is 15.55 kg/m².    Physical Exam  Vitals reviewed.   Constitutional:       Appearance: Normal appearance. She is not ill-appearing.   HENT:      Head: Normocephalic and atraumatic.   Eyes:      Pupils: Pupils are equal, round, and reactive to light.   Cardiovascular:      Rate and Rhythm: Normal rate and regular rhythm.   Pulmonary:      Breath sounds: Normal breath sounds.      Comments: decreased intake volume  Abdominal:      General: There is no distension.      Palpations: Abdomen is soft.      Tenderness: There is no abdominal tenderness.   Musculoskeletal:      Right lower leg: No edema.      Left lower leg: No edema.   Skin:     General: Skin is warm and dry.      Comments: AV fistula noted to left UE; no palpable thrill. Sutures in place, wound margins well approximated, no sign of infection, moderate surrounding ecchymosis    Neurological:      General: No focal deficit present.      Mental Status: She is alert and oriented to person, place, and time.   Psychiatric:         Mood and Affect: Mood normal.         Behavior: Behavior normal.         CRANIAL NERVES     CN III, IV, VI   Pupils are equal, round, and reactive to light.     Significant Labs: All pertinent labs within the past 24 hours have been reviewed.  CBC:   Recent Labs   Lab 04/15/22  1338   WBC 11.89   HGB 10.8*   HCT 34.9*        CMP:   Recent Labs   Lab 04/15/22  1338      K 3.9      CO2 26   GLU 69*   BUN 34*   CREATININE 2.2*   CALCIUM 9.5   PROT 7.8   ALBUMIN 3.8   BILITOT 0.6   ALKPHOS 191*   AST 18   ALT 20   ANIONGAP 13   EGFRNONAA 21*       Significant Imaging: I have reviewed all pertinent imaging results/findings within the past 24 hours.  I have reviewed and  interpreted all pertinent imaging results/findings within the past 24 hours.    Assessment/Plan:     * Clotted renal dialysis arteriovenous graft  - recurrent issue, last declotting procedure 04/08  - Consult general surgery and nephrology; plan for placement of catheter tomorrow and HD to follow    ESRD (end stage renal disease) on dialysis  - HD, MWF. Last session Wednesday 04/13  - Clotted fistula, unable to have HD today  - Labs and VS stable  - continue home sevelamer      Chronic respiratory failure with hypoxia, on home O2 therapy  - chronic, stable  - continue home inhales and nebulizer  - continue supplemental O2 at 2L NC for SpO2 >88%        VTE Risk Mitigation (From admission, onward)         Ordered     heparin (porcine) injection 5,000 Units  Every 8 hours         04/15/22 1503     IP VTE HIGH RISK PATIENT  Once         04/15/22 1503     Place sequential compression device  Until discontinued         04/15/22 1503                   Jessica Villanueva PA-C  Department of Hospital Medicine   [place] - Emergency Dept

## 2022-04-15 NOTE — ASSESSMENT & PLAN NOTE
- HD, MWF. Last session Wednesday 04/13  - Clotted fistula, unable to have HD today  - Labs and VS stable  - continue home sevelamer

## 2022-04-15 NOTE — PROGRESS NOTES
Requested updates within Care Everywhere.  Patient's chart was reviewed for overdue SHALOM topics.  Health maintenance:updated  Immunizations:reconciled   Legacy:   Media:  Orders placed:  Tasked appts:  Labs Linked:  Upcoming appt:

## 2022-04-15 NOTE — PHARMACY MED REC
"  Admission Medication History     The home medication history was taken by Meera Infante CPhT.    Medication history obtained from, Patient Verified    You may go to "Admission" then "Reconcile Home Medications" tabs to review and/or act upon these items.      The home medication list has been updated by the Pharmacy department.    Please read ALL comments highlighted in yellow.    Please address this information as you see fit.     Feel free to contact us if you have any questions or require assistance.      The medications listed below were removed from the home medication list.  Please reorder if appropriate:  Patient reports no longer taking the following medication(s):   Radha-lobo 0.8 mg   Clonazepam 1 mg   Diclofenac 1% gel   Emla cream   Zolpidem 5 mg        Meera Infante CPhT.  Ext [phone]              .          "

## 2022-04-15 NOTE — ASSESSMENT & PLAN NOTE
- recurrent issue, last declotting procedure 04/08  - Consult general surgery and nephrology; plan for placement of catheter tomorrow and HD to follow

## 2022-04-15 NOTE — ED PROVIDER NOTES
Encounter Date: 4/15/2022       History     Chief Complaint   Patient presents with    Hemodialysis Access     Pt presents to ED today reports she was unable to received dialysis today due to clotted fistula pt reports she was last received dialysis on Wednesday. Pt arrives on 2L home O2      Katie Quevedo is a 76 y.o. female who  has a past medical history of Acute congestive heart failure (02/10/2020), Anemia, Bilateral renal cysts, Cataract, Chronic LBP (7/26/2012), Chronic pain, CKD (chronic kidney disease), stage IV, Colon polyp (2013), COPD (chronic obstructive pulmonary disease), Dehydration, Encounter for blood transfusion, HTN (hypertension), Lumbar spondylosis, Melanoma, Metabolic bone disease, Migraines, neuralgic, Osteoporosis, Primary osteoarthritis of both knees, Pulmonary embolism with infarction, Seizures (1972), Subdeltoid bursitis, L>R. (9/27/2012), Ulcer, and Vitamin D deficiency disease.    The patient presents to the ED due to clotted HD fistula.   She has ESRD on HD M/W/F, last session on Wed. She attempted to go to HD but they were unable to access her fistula today.   She has had multiple declotting procedures over the last few weeks due to clotting.   She denies any pain. No weakness/numbness/tingling. No bleeding from site.  No CP, SOB, or any other concerns.         Review of patient's allergies indicates:   Allergen Reactions    Aspirin      Other reaction(s): hx of ulcers    Tetracycline Swelling     Other reaction(s): Swelling    Penicillins Rash     Other reaction(s): Hives  Other reaction(s): Rash  Other reaction(s): Rash  Other reaction(s): Hives     Past Medical History:   Diagnosis Date    Acute congestive heart failure 02/10/2020    Anemia     Bilateral renal cysts     Cataract     Chronic LBP 7/26/2012    Chronic pain     CKD (chronic kidney disease), stage IV     Colon polyp 2013    COPD (chronic obstructive pulmonary disease)     Dehydration     Encounter for  blood transfusion     HTN (hypertension)     Lumbar spondylosis     Melanoma     of the lip    Metabolic bone disease     Migraines, neuralgic     Osteoporosis     Primary osteoarthritis of both knees     s/p Rt TKA    Pulmonary embolism with infarction     Seizures 1972    x1 only    Subdeltoid bursitis, L>R. 9/27/2012    Ulcer     Vitamin D deficiency disease      Past Surgical History:   Procedure Laterality Date    BLADDER SUSPENSION      CATARACT EXTRACTION  11/18/13    left eye    CERVICAL LAMINECTOMY      x3, fusion x1    COLONOSCOPY  2009    DECLOTTING OF ARTERIOVENOUS GRAFT Left 1/3/2022    Procedure: [id number];  Surgeon: Lindsey Louie MD;  Location: [facility] OR;  Service: Vascular;  Laterality: Left;    DECLOTTING OF ARTERIOVENOUS GRAFT Left 2/8/2022    Procedure: [id number];  Surgeon: Lindsey Louie MD;  Location: [facility] OR;  Service: Vascular;  Laterality: Left;    DECLOTTING OF ARTERIOVENOUS GRAFT Left 4/8/2022    Procedure: [id number];  Surgeon: Lindsey Louie MD;  Location: [facility] OR;  Service: Vascular;  Laterality: Left;    FISTULOGRAM N/A 2/27/2020    Procedure: Fistulogram;  Surgeon: Noe Benitez MD;  Location: [facility] CATH LAB/EP;  Service: Cardiology;  Laterality: N/A;    HYSTERECTOMY      JOINT REPLACEMENT  2001    total right knee     LUMBAR LAMINECTOMY      x 3, fusion x1    OOPHORECTOMY      PERIPHERAL ANGIOGRAPHY N/A 3/9/2020    Procedure: Peripheral angiography;  Surgeon: Jacinto Henriquez MD;  Location: [facility] CATH LAB/EP;  Service: Cardiology;  Laterality: N/A;    PLACEMENT OF ARTERIOVENOUS GRAFT Left 1/21/2020    Procedure: INSERTION, GRAFT, ARTERIOVENOUS;  Surgeon: Lindsey Louie MD;  Location: [facility] OR;  Service: General;  Laterality: Left;    THROMBECTOMY Left 2/3/2020    Procedure: THROMBECTOMY;  Surgeon: Lindsey Louie MD;  Location: [facility] OR;  Service: General;  Laterality: Left;    THROMBECTOMY  3/9/2020     Procedure: Thrombectomy;  Surgeon: Jacinto Henriquez MD;  Location: [facility] CATH LAB/EP;  Service: Cardiology;;    THROMBECTOMY Left 4/7/2022    Procedure: THROMBECTOMY;  Surgeon: Lindsey Louie MD;  Location: [facility] OR;  Service: General;  Laterality: Left;     Family History   Problem Relation Age of Onset    Arthritis Mother     Stroke Mother     Hypertension Father     Cancer Father     Cataracts Father     Diabetes Maternal Aunt     Hypertension Maternal Grandfather     Heart disease Maternal Grandfather     Heart attack Maternal Grandfather     Cataracts Sister     Glaucoma Cousin      Social History     Tobacco Use    Smoking status: Former Smoker     Types: Cigarettes    Smokeless tobacco: Former User     Quit date: 2/3/2015   Substance Use Topics    Alcohol use: Not Currently     Comment: Rare    Drug use: No     Review of Systems   Constitutional: Negative for chills and fever.   HENT: Negative for sore throat.    Respiratory: Negative for cough and shortness of breath.    Cardiovascular: Negative for chest pain.   Gastrointestinal: Negative for nausea and vomiting.   Genitourinary: Negative for dysuria, frequency and urgency.   Musculoskeletal: Negative for back pain.   Skin: Positive for wound. Negative for rash.   Neurological: Negative for syncope and weakness.   Hematological: Does not bruise/bleed easily.   Psychiatric/Behavioral: Negative for agitation, behavioral problems and confusion.       Physical Exam     Initial Vitals [04/15/22 1300]   BP Pulse Resp Temp SpO2   (!) 118/59 85 19 97.9 °F (36.6 °C) 100 %      MAP       --         Physical Exam    Nursing note and vitals reviewed.  Constitutional: She appears well-developed and well-nourished. She is not diaphoretic. No distress.   HENT:   Head: Normocephalic and atraumatic.   Mouth/Throat: Oropharynx is clear and moist.   Eyes: EOM are normal. Pupils are equal, round, and reactive to light.   Neck: No tracheal deviation  present.   Cardiovascular: Normal rate, regular rhythm, normal heart sounds and intact distal pulses.   LUE fistula without palpable thrill.   Sutures intact overlying fistula site. Moderate bruising noted.    Pulmonary/Chest: Breath sounds normal. No stridor. No respiratory distress. She has no wheezes.   Abdominal: Abdomen is soft. Bowel sounds are normal. She exhibits no distension and no mass. There is no abdominal tenderness.   Musculoskeletal:         General: No edema. Normal range of motion.     Neurological: She is alert and oriented to person, place, and time. She has normal strength. No cranial nerve deficit or sensory deficit.   Skin: Skin is warm and dry. Capillary refill takes less than 2 seconds. No pallor.   Psychiatric: She has a normal mood and affect. Her behavior is normal. Thought content normal.         ED Course   Procedures  Labs Reviewed   CBC WITHOUT DIFFERENTIAL - Abnormal; Notable for the following components:       Result Value    RBC 3.53 (*)     Hemoglobin 10.8 (*)     Hematocrit 34.9 (*)     MCV 99 (*)     MCHC 30.9 (*)     All other components within normal limits   COMPREHENSIVE METABOLIC PANEL - Abnormal; Notable for the following components:    Glucose 69 (*)     BUN 34 (*)     Creatinine 2.2 (*)     Alkaline Phosphatase 191 (*)     eGFR if  24 (*)     eGFR if non  21 (*)     All other components within normal limits     EKG Readings: (Independently Interpreted)   Initial Reading: No STEMI. Previous EKG: Compared with most recent EKG Rhythm: Normal Sinus Rhythm.   Normal sinus rhythm, rate 77, nonspecific ST changes, no ST elevations or other signs of ischemia, normal intervals.  Grossly stable from April 2022.        Imaging Results    None          Medications - No data to display  Medical Decision Making:   History:   Old Medical Records: I decided to obtain old medical records.  Old Records Summarized: other records and records from clinic  visits.       <> Summary of Records: Recently admitted 4/5-4/9, required fistula declotting x2 with Dr. Louie.    Initial Assessment:   75 yo F with clotted HD fistula. Last received HD 2 days ago.  Will obtain labs and discuss with Dr. Louie for recommendations.   Differential Diagnosis:   Differential Diagnosis includes, but is not limited to:  Hyperkalemia, fluid overload, clotted fistula, missed HD  Independently Interpreted Test(s):   I have ordered and independently interpreted EKG Reading(s) - see prior notes  Clinical Tests:   Lab Tests: Ordered and Reviewed  Medical Tests: Ordered and Reviewed  ED Management:  Labs unremarkable. No respiratory distress, vitals stable.  Discussed with Dr. Louie, who recommends admission to  and will plan for dialysis catheter placement in AM.    [facility] contacted for admission.    On re-evaluation, the patient's status has remained stable.  At this time, I believe the patient should be admitted to the hospital for further evaluation and management of missed HD, clotted fistula.   service was contacted and the case was discussed.   The consulting physician/team agrees with plan and will admit under their service.   The patient and family were updated with test results, overall impression, and further plan of care. All questions were answered. The patient expressed understanding and agrees with the current plan.                        Clinical Impression:   Final diagnoses:  [N18.6, Z99.2] ESRD on hemodialysis  [Z99.2] Dialysis patient  [T82.868A] Thrombosis of arteriovenous fistula, initial encounter (Primary)          ED Disposition Condition    Observation                     Roland Green MD  04/15/22 5927

## 2022-04-15 NOTE — HPI
Katie Quevedo is a 76 y.o. female with PMHx significant for CHF, COPD w/ chronic respiratory failure on home O2, ESRD on HD (MWF), and h/o recurrent clotted AV fistula. Her last dialysis was Wednesday, she went for HD today but was unable to access her fistula. She was recently admitted for a similar issue and underwent declotting thrombectomy on 04/07/22 and 04/08/22 by Dr. Louie. She denies any other complaints today; specifically denies pain, CP, SOB, weakness, headache, nausea, vomiting, cramping, numbness. On presentation, vital signs stable. Laboratory studies without electrolyte abnormalities. EKG unchanged from prior. Discussed case with Dr. Louie and Dr. Diggs. She will be admitted under observation to  service. Plans for dialysis catheter placement and HD tomorrow.

## 2022-04-16 ENCOUNTER — ANESTHESIA EVENT (OUTPATIENT)
Dept: SURGERY | Facility: HOSPITAL | Age: 77
End: 2022-04-16
Payer: MEDICARE

## 2022-04-16 ENCOUNTER — ANESTHESIA (OUTPATIENT)
Dept: SURGERY | Facility: HOSPITAL | Age: 77
End: 2022-04-16
Payer: MEDICARE

## 2022-04-16 VITALS
HEIGHT: 62 IN | DIASTOLIC BLOOD PRESSURE: 59 MMHG | SYSTOLIC BLOOD PRESSURE: 132 MMHG | RESPIRATION RATE: 16 BRPM | HEART RATE: 78 BPM | BODY MASS INDEX: 17.72 KG/M2 | TEMPERATURE: 98 F | OXYGEN SATURATION: 100 % | WEIGHT: 96.31 LBS

## 2022-04-16 LAB
ALBUMIN SERPL BCP-MCNC: 3.1 G/DL (ref 3.5–5.2)
ALP SERPL-CCNC: 158 U/L (ref 55–135)
ALT SERPL W/O P-5'-P-CCNC: 18 U/L (ref 10–44)
ANION GAP SERPL CALC-SCNC: 9 MMOL/L (ref 8–16)
AST SERPL-CCNC: 14 U/L (ref 10–40)
BASOPHILS # BLD AUTO: 0.04 K/UL (ref 0–0.2)
BASOPHILS NFR BLD: 0.5 % (ref 0–1.9)
BILIRUB SERPL-MCNC: 0.5 MG/DL (ref 0.1–1)
BUN SERPL-MCNC: 34 MG/DL (ref 8–23)
CALCIUM SERPL-MCNC: 9 MG/DL (ref 8.7–10.5)
CHLORIDE SERPL-SCNC: 105 MMOL/L (ref 95–110)
CO2 SERPL-SCNC: 26 MMOL/L (ref 23–29)
CREAT SERPL-MCNC: 2.1 MG/DL (ref 0.5–1.4)
DIFFERENTIAL METHOD: ABNORMAL
EOSINOPHIL # BLD AUTO: 0.7 K/UL (ref 0–0.5)
EOSINOPHIL NFR BLD: 7.8 % (ref 0–8)
ERYTHROCYTE [DISTWIDTH] IN BLOOD BY AUTOMATED COUNT: 14.3 % (ref 11.5–14.5)
EST. GFR  (AFRICAN AMERICAN): 25 ML/MIN/1.73 M^2
EST. GFR  (NON AFRICAN AMERICAN): 22 ML/MIN/1.73 M^2
GLUCOSE SERPL-MCNC: 75 MG/DL (ref 70–110)
HCT VFR BLD AUTO: 30.8 % (ref 37–48.5)
HGB BLD-MCNC: 9.9 G/DL (ref 12–16)
IMM GRANULOCYTES # BLD AUTO: 0.1 K/UL (ref 0–0.04)
IMM GRANULOCYTES NFR BLD AUTO: 1.2 % (ref 0–0.5)
LYMPHOCYTES # BLD AUTO: 1.6 K/UL (ref 1–4.8)
LYMPHOCYTES NFR BLD: 19 % (ref 18–48)
MAGNESIUM SERPL-MCNC: 2.1 MG/DL (ref 1.6–2.6)
MCH RBC QN AUTO: 31.2 PG (ref 27–31)
MCHC RBC AUTO-ENTMCNC: 32.1 G/DL (ref 32–36)
MCV RBC AUTO: 97 FL (ref 82–98)
MONOCYTES # BLD AUTO: 0.9 K/UL (ref 0.3–1)
MONOCYTES NFR BLD: 11 % (ref 4–15)
NEUTROPHILS # BLD AUTO: 5.1 K/UL (ref 1.8–7.7)
NEUTROPHILS NFR BLD: 60.5 % (ref 38–73)
NRBC BLD-RTO: 0 /100 WBC
PHOSPHATE SERPL-MCNC: 3.3 MG/DL (ref 2.7–4.5)
PLATELET # BLD AUTO: 243 K/UL (ref 150–450)
PMV BLD AUTO: 9.6 FL (ref 9.2–12.9)
POTASSIUM SERPL-SCNC: 4.5 MMOL/L (ref 3.5–5.1)
PROT SERPL-MCNC: 6.4 G/DL (ref 6–8.4)
RBC # BLD AUTO: 3.17 M/UL (ref 4–5.4)
SODIUM SERPL-SCNC: 140 MMOL/L (ref 136–145)
WBC # BLD AUTO: 8.35 K/UL (ref 3.9–12.7)

## 2022-04-16 PROCEDURE — 37000009 HC ANESTHESIA EA ADD 15 MINS: Performed by: SURGERY

## 2022-04-16 PROCEDURE — 25000003 PHARM REV CODE 250: Performed by: STUDENT IN AN ORGANIZED HEALTH CARE EDUCATION/TRAINING PROGRAM

## 2022-04-16 PROCEDURE — C1750 CATH, HEMODIALYSIS,LONG-TERM: HCPCS | Performed by: SURGERY

## 2022-04-16 PROCEDURE — 94640 AIRWAY INHALATION TREATMENT: CPT

## 2022-04-16 PROCEDURE — 25000003 PHARM REV CODE 250: Performed by: ORTHOPAEDIC SURGERY

## 2022-04-16 PROCEDURE — 90935 HEMODIALYSIS ONE EVALUATION: CPT

## 2022-04-16 PROCEDURE — 27000221 HC OXYGEN, UP TO 24 HOURS

## 2022-04-16 PROCEDURE — 84100 ASSAY OF PHOSPHORUS: CPT | Performed by: ORTHOPAEDIC SURGERY

## 2022-04-16 PROCEDURE — 25000003 PHARM REV CODE 250: Performed by: SURGERY

## 2022-04-16 PROCEDURE — 36415 COLL VENOUS BLD VENIPUNCTURE: CPT | Performed by: ORTHOPAEDIC SURGERY

## 2022-04-16 PROCEDURE — 36000707: Performed by: SURGERY

## 2022-04-16 PROCEDURE — 36000706: Performed by: SURGERY

## 2022-04-16 PROCEDURE — G0257 UNSCHED DIALYSIS ESRD PT HOS: HCPCS

## 2022-04-16 PROCEDURE — 25000242 PHARM REV CODE 250 ALT 637 W/ HCPCS: Performed by: ORTHOPAEDIC SURGERY

## 2022-04-16 PROCEDURE — 63600175 PHARM REV CODE 636 W HCPCS: Performed by: SURGERY

## 2022-04-16 PROCEDURE — G0378 HOSPITAL OBSERVATION PER HR: HCPCS

## 2022-04-16 PROCEDURE — 63600175 PHARM REV CODE 636 W HCPCS: Mod: JG | Performed by: INTERNAL MEDICINE

## 2022-04-16 PROCEDURE — 85025 COMPLETE CBC W/AUTO DIFF WBC: CPT | Performed by: ORTHOPAEDIC SURGERY

## 2022-04-16 PROCEDURE — 25000003 PHARM REV CODE 250: Performed by: INTERNAL MEDICINE

## 2022-04-16 PROCEDURE — 99900035 HC TECH TIME PER 15 MIN (STAT)

## 2022-04-16 PROCEDURE — 80053 COMPREHEN METABOLIC PANEL: CPT | Performed by: ORTHOPAEDIC SURGERY

## 2022-04-16 PROCEDURE — 37000008 HC ANESTHESIA 1ST 15 MINUTES: Performed by: SURGERY

## 2022-04-16 PROCEDURE — 83735 ASSAY OF MAGNESIUM: CPT | Performed by: ORTHOPAEDIC SURGERY

## 2022-04-16 PROCEDURE — 94761 N-INVAS EAR/PLS OXIMETRY MLT: CPT

## 2022-04-16 PROCEDURE — 71000033 HC RECOVERY, INTIAL HOUR: Performed by: SURGERY

## 2022-04-16 DEVICE — CATH DIALYSIS HEMOSPLIT16FR 23: Type: IMPLANTABLE DEVICE | Site: CHEST | Status: FUNCTIONAL

## 2022-04-16 RX ORDER — MUPIROCIN 20 MG/G
OINTMENT TOPICAL 2 TIMES DAILY
Status: DISCONTINUED | OUTPATIENT
Start: 2022-04-16 | End: 2022-04-17 | Stop reason: HOSPADM

## 2022-04-16 RX ORDER — ONDANSETRON 2 MG/ML
4 INJECTION INTRAMUSCULAR; INTRAVENOUS DAILY PRN
Status: CANCELLED | OUTPATIENT
Start: 2022-04-16

## 2022-04-16 RX ORDER — PROPOFOL 10 MG/ML
INJECTION, EMULSION INTRAVENOUS
Status: DISCONTINUED | OUTPATIENT
Start: 2022-04-16 | End: 2022-04-16

## 2022-04-16 RX ORDER — PROPOFOL 10 MG/ML
INJECTION, EMULSION INTRAVENOUS CONTINUOUS PRN
Status: DISCONTINUED | OUTPATIENT
Start: 2022-04-16 | End: 2022-04-16

## 2022-04-16 RX ORDER — OXYCODONE HYDROCHLORIDE 5 MG/1
5 TABLET ORAL
Status: CANCELLED | OUTPATIENT
Start: 2022-04-16

## 2022-04-16 RX ORDER — HYDROMORPHONE HYDROCHLORIDE 2 MG/ML
0.5 INJECTION, SOLUTION INTRAMUSCULAR; INTRAVENOUS; SUBCUTANEOUS EVERY 5 MIN PRN
Status: CANCELLED | OUTPATIENT
Start: 2022-04-16

## 2022-04-16 RX ORDER — SODIUM CHLORIDE 9 MG/ML
INJECTION, SOLUTION INTRAVENOUS ONCE
Status: DISCONTINUED | OUTPATIENT
Start: 2022-04-16 | End: 2022-04-17 | Stop reason: HOSPADM

## 2022-04-16 RX ORDER — SODIUM CHLORIDE 0.9 % (FLUSH) 0.9 %
10 SYRINGE (ML) INJECTION
Status: CANCELLED | OUTPATIENT
Start: 2022-04-16

## 2022-04-16 RX ORDER — LIDOCAINE HCL/PF 100 MG/5ML
SYRINGE (ML) INTRAVENOUS
Status: DISCONTINUED | OUTPATIENT
Start: 2022-04-16 | End: 2022-04-16

## 2022-04-16 RX ORDER — LIDOCAINE HYDROCHLORIDE 10 MG/ML
INJECTION, SOLUTION EPIDURAL; INFILTRATION; INTRACAUDAL; PERINEURAL
Status: DISCONTINUED | OUTPATIENT
Start: 2022-04-16 | End: 2022-04-16 | Stop reason: HOSPADM

## 2022-04-16 RX ORDER — SODIUM CHLORIDE 9 MG/ML
INJECTION, SOLUTION INTRAVENOUS
Status: DISCONTINUED | OUTPATIENT
Start: 2022-04-16 | End: 2022-04-17 | Stop reason: HOSPADM

## 2022-04-16 RX ORDER — DIPHENHYDRAMINE HYDROCHLORIDE 50 MG/ML
12.5 INJECTION INTRAMUSCULAR; INTRAVENOUS EVERY 6 HOURS PRN
Status: CANCELLED | OUTPATIENT
Start: 2022-04-16

## 2022-04-16 RX ORDER — CLINDAMYCIN PHOSPHATE 900 MG/50ML
INJECTION, SOLUTION INTRAVENOUS
Status: DISCONTINUED | OUTPATIENT
Start: 2022-04-16 | End: 2022-04-16

## 2022-04-16 RX ORDER — KETAMINE HCL IN 0.9 % NACL 50 MG/5 ML
SYRINGE (ML) INTRAVENOUS
Status: DISCONTINUED | OUTPATIENT
Start: 2022-04-16 | End: 2022-04-16

## 2022-04-16 RX ORDER — HEPARIN SODIUM 5000 [USP'U]/ML
INJECTION, SOLUTION INTRAVENOUS; SUBCUTANEOUS
Status: DISCONTINUED | OUTPATIENT
Start: 2022-04-16 | End: 2022-04-16 | Stop reason: HOSPADM

## 2022-04-16 RX ADMIN — CIPROFLOXACIN HYDROCHLORIDE 250 MG: 250 TABLET, FILM COATED ORAL at 05:04

## 2022-04-16 RX ADMIN — SEVELAMER CARBONATE 800 MG: 800 TABLET, FILM COATED ORAL at 05:04

## 2022-04-16 RX ADMIN — PROPOFOL 50 MCG/KG/MIN: 10 INJECTION, EMULSION INTRAVENOUS at 03:04

## 2022-04-16 RX ADMIN — MUPIROCIN: 20 OINTMENT TOPICAL at 11:04

## 2022-04-16 RX ADMIN — ACETAMINOPHEN 325 MG: 325 TABLET ORAL at 12:04

## 2022-04-16 RX ADMIN — DOCUSATE SODIUM AND SENNOSIDES 1 TABLET: 8.6; 5 TABLET, FILM COATED ORAL at 09:04

## 2022-04-16 RX ADMIN — PROPOFOL 20 MG: 10 INJECTION, EMULSION INTRAVENOUS at 03:04

## 2022-04-16 RX ADMIN — LIDOCAINE HYDROCHLORIDE 60 MG: 20 INJECTION, SOLUTION INTRAVENOUS at 03:04

## 2022-04-16 RX ADMIN — FLUTICASONE FUROATE AND VILANTEROL TRIFENATATE 1 PUFF: 100; 25 POWDER RESPIRATORY (INHALATION) at 09:04

## 2022-04-16 RX ADMIN — MIRTAZAPINE 15 MG: 15 TABLET, ORALLY DISINTEGRATING ORAL at 09:04

## 2022-04-16 RX ADMIN — MUPIROCIN: 20 OINTMENT TOPICAL at 09:04

## 2022-04-16 RX ADMIN — EPOETIN ALFA-EPBX 5000 UNITS: 3000 INJECTION, SOLUTION INTRAVENOUS; SUBCUTANEOUS at 08:04

## 2022-04-16 RX ADMIN — HYDROCODONE BITARTRATE AND ACETAMINOPHEN 1 TABLET: 10; 325 TABLET ORAL at 09:04

## 2022-04-16 RX ADMIN — Medication 5 MG: at 03:04

## 2022-04-16 RX ADMIN — HYDROCODONE BITARTRATE AND ACETAMINOPHEN 1 TABLET: 10; 325 TABLET ORAL at 04:04

## 2022-04-16 RX ADMIN — CLINDAMYCIN IN 5 PERCENT DEXTROSE 900 MG: 18 INJECTION, SOLUTION INTRAVENOUS at 03:04

## 2022-04-16 RX ADMIN — HYDROCODONE BITARTRATE AND ACETAMINOPHEN 1 TABLET: 10; 325 TABLET ORAL at 08:04

## 2022-04-16 NOTE — TRANSFER OF CARE
"Anesthesia Transfer of Care Note    Patient: Katie Quevedo    Procedure(s) Performed: Procedure(s) (LRB):  INSERTION-CATHETER-RICKI (Right)    Patient location: [facility] Surgical Floor    Anesthesia Type: MAC    Transport from OR: Transported from OR on 6-10 L/min O2 by face mask with adequate spontaneous ventilation    Post pain: adequate analgesia    Post assessment: no apparent anesthetic complications and tolerated procedure well    Post vital signs: stable    Level of consciousness: awake, alert and oriented    Nausea/Vomiting: no nausea/vomiting    Complications: none    Transfer of care protocol was followed      Last vitals:   Visit Vitals  /60 (BP Location: Right arm, Patient Position: Lying)   Pulse 66   Temp 36.8 °C (98.2 °F) (Oral)   Resp 17   Ht 5' 2" (1.575 m)   Wt 43.7 kg (96 lb 5.5 oz)   LMP  (LMP Unknown)   SpO2 98%   Breastfeeding No   BMI 17.62 kg/m²     "

## 2022-04-16 NOTE — ASSESSMENT & PLAN NOTE
- HD, MWF. Last session Wednesday 04/13  - Clotted fistula, unable to have HD today  - Labs and VS stable  - continue home sevelamer  - HD today after port placement

## 2022-04-16 NOTE — NURSING
Patient came back from surgery, alert and awake. Right chest permacath intact. Vitals taken. Patient to have dialysis today. On-call dialysis made aware. [facility] on-call dialysis nurse will be coming at 6:30pm.

## 2022-04-16 NOTE — ANESTHESIA POSTPROCEDURE EVALUATION
Anesthesia Post Evaluation    Patient: Katie Quevedo    Procedure(s) Performed: Procedure(s) (LRB):  INSERTION-CATHETER-RICKI (Right)    Final Anesthesia Type: MAC      Patient location during evaluation: PACU  Patient participation: Yes- Able to Participate  Level of consciousness: awake and alert  Post-procedure vital signs: reviewed and stable  Pain management: adequate  Airway patency: patent    PONV status at discharge: No PONV  Anesthetic complications: no      Cardiovascular status: blood pressure returned to baseline and hemodynamically stable  Respiratory status: unassisted  Hydration status: euvolemic  Follow-up not needed.          Vitals Value Taken Time   /74 04/16/22 1619   Temp 36.8 °C (98.2 °F) 04/16/22 1600   Pulse 78 04/16/22 1619   Resp 16 04/16/22 1619   SpO2 96 % 04/16/22 1619   Vitals shown include unvalidated device data.      No case tracking events are documented in the log.      Pain/Nelson Score: Pain Rating Prior to Med Admin: 5 (4/16/2022 12:03 PM)  Pain Rating Post Med Admin: 0 (4/16/2022  3:00 AM)  Nelson Score: 9 (4/16/2022  4:00 PM)

## 2022-04-16 NOTE — PLAN OF CARE
Patient on oxygen with documented flow.  Will attempt to wean per O2 order protocol. The proper method of use, as well as anticipated side effects, of this metered-dose inhaler are discussed and demonstrated to the patient. Will continue to monitor.

## 2022-04-16 NOTE — ASSESSMENT & PLAN NOTE
- recurrent issue, last declotting procedure 04/08  - Consult general surgery and nephrology; plan for placement of catheter tomorrow and HD to follow  - to OR today for catheter placement   - anticipate dc after HD can be performed

## 2022-04-16 NOTE — CONSULTS
NEPHROLOGY CONSULT NOTE    HPI & INTERVAL HISTORY:    Past Medical History:   Diagnosis Date    Acute congestive heart failure 02/10/2020    Anemia     Bilateral renal cysts     Cataract     Chronic LBP 7/26/2012    Chronic pain     CKD (chronic kidney disease), stage IV     Colon polyp 2013    COPD (chronic obstructive pulmonary disease)     Dehydration     Encounter for blood transfusion     HTN (hypertension)     Lumbar spondylosis     Melanoma     of the lip    Metabolic bone disease     Migraines, neuralgic     Osteoporosis     Primary osteoarthritis of both knees     s/p Rt TKA    Pulmonary embolism with infarction     Seizures 1972    x1 only    Subdeltoid bursitis, L>R. 9/27/2012    Ulcer     Vitamin D deficiency disease       Past Surgical History:   Procedure Laterality Date    BLADDER SUSPENSION      CATARACT EXTRACTION  11/18/13    left eye    CERVICAL LAMINECTOMY      x3, fusion x1    COLONOSCOPY  2009    DECLOTTING OF ARTERIOVENOUS GRAFT Left 1/3/2022    Procedure: [id number];  Surgeon: Lindsey Louie MD;  Location: [facility] OR;  Service: Vascular;  Laterality: Left;    DECLOTTING OF ARTERIOVENOUS GRAFT Left 2/8/2022    Procedure: [id number];  Surgeon: Lindsey Louie MD;  Location: [facility] OR;  Service: Vascular;  Laterality: Left;    DECLOTTING OF ARTERIOVENOUS GRAFT Left 4/8/2022    Procedure: [id number];  Surgeon: Lindsey Louie MD;  Location: [facility] OR;  Service: Vascular;  Laterality: Left;    FISTULOGRAM N/A 2/27/2020    Procedure: Fistulogram;  Surgeon: Noe Benitez MD;  Location: [facility] CATH LAB/EP;  Service: Cardiology;  Laterality: N/A;    HYSTERECTOMY      JOINT REPLACEMENT  2001    total right knee     LUMBAR LAMINECTOMY      x 3, fusion x1    OOPHORECTOMY      PERIPHERAL ANGIOGRAPHY N/A 3/9/2020    Procedure: Peripheral angiography;  Surgeon: Jacinto Henriquez MD;  Location: [facility] CATH LAB/EP;  Service: Cardiology;   Laterality: N/A;    PLACEMENT OF ARTERIOVENOUS GRAFT Left 1/21/2020    Procedure: INSERTION, GRAFT, ARTERIOVENOUS;  Surgeon: Lindsey Louie MD;  Location: [facility] OR;  Service: General;  Laterality: Left;    THROMBECTOMY Left 2/3/2020    Procedure: THROMBECTOMY;  Surgeon: Lindsey Louie MD;  Location: [facility] OR;  Service: General;  Laterality: Left;    THROMBECTOMY  3/9/2020    Procedure: Thrombectomy;  Surgeon: Jacinto Henriquez MD;  Location: [facility] CATH LAB/EP;  Service: Cardiology;;    THROMBECTOMY Left 4/7/2022    Procedure: THROMBECTOMY;  Surgeon: Lindsey Louie MD;  Location: [facility] OR;  Service: General;  Laterality: Left;      Review of patient's allergies indicates:   Allergen Reactions    Aspirin      Other reaction(s): hx of ulcers    Tetracycline Swelling     Other reaction(s): Swelling    Penicillins Rash     Other reaction(s): Hives  Other reaction(s): Rash  Other reaction(s): Rash  Other reaction(s): Hives      Medications Prior to Admission   Medication Sig Dispense Refill Last Dose    acetaminophen (TYLENOL) 325 MG tablet Take 1 tablet (325 mg total) by mouth every 6 (six) hours as needed for Pain. 10 tablet 0 Past Week at Unknown time    albuterol (VENTOLIN HFA) 90 mcg/actuation inhaler Inhale 2 puffs into the lungs every 6 (six) hours as needed for Wheezing or Shortness of Breath. Rescue 18 g 3 4/15/2022 at Unknown time    albuterol-ipratropium (DUO-NEB) 2.5 mg-0.5 mg/3 mL nebulizer solution Take 3 mLs by nebulization every 6 (six) hours as needed for Shortness of Breath. Rescue 180 mL 11 Past Week at Unknown time    allopurinoL (ZYLOPRIM) 100 MG tablet Take 1 tablet (100 mg total) by mouth on Tuesday, Thursday, Saturday, and Sunday. 30 tablet 0     busPIRone (BUSPAR) 10 MG tablet Take 10 mg by mouth once daily.       ciprofloxacin HCl (CIPRO) 250 MG tablet Take 1 tablet by mouth every day for 7 days 7 tablet 0 4/14/2022 at Unknown time    diphenhydrAMINE (BENADRYL) 25 mg  capsule Take 25 mg by mouth every 6 (six) hours as needed for Itching.   Past Week at Unknown time    epoetin hemant-epbx (RETACRIT) 10,000 unit/mL imjection Inject 0.5 mLs (5,000 Units total) into the skin every Mon, Wed, Fri.   Past Week at Unknown time    fluticasone-umeclidin-vilanter (TRELEGY ELLIPTA) 200-62.5-25 mcg inhaler Inhale 1 puff into the lungs once daily. 60 each 11 4/14/2022 at Unknown time    HYDROcodone-acetaminophen (NORCO)  mg per tablet Take 1 tablet by mouth 3 (three) times daily as needed for Pain. 90 tablet 0 4/15/2022 at Unknown time    mirtazapine (REMERON) 15 MG tablet Take 1 tablet (15 mg total) by mouth every evening. 30 tablet 11 4/14/2022 at Unknown time    sevelamer carbonate (RENVELA) 800 mg Tab Take 1 tablet (800 mg total) by mouth after meals as needed. 90 tablet 11 4/14/2022 at Unknown time       Social History     Socioeconomic History    Marital status:    Tobacco Use    Smoking status: Former Smoker     Types: Cigarettes    Smokeless tobacco: Former User     Quit date: 2/3/2015   Substance and Sexual Activity    Alcohol use: Not Currently     Comment: Rare    Drug use: No    Sexual activity: Never     Partners: Male     Social Determinants of Health     Financial Resource Strain: Low Risk     Difficulty of Paying Living Expenses: Not hard at all   Food Insecurity: No Food Insecurity    Worried About Running Out of Food in the Last Year: Never true    Ran Out of Food in the Last Year: Never true   Transportation Needs: No Transportation Needs    Lack of Transportation (Medical): No    Lack of Transportation (Non-Medical): No   Physical Activity: Inactive    Days of Exercise per Week: 0 days    Minutes of Exercise per Session: 0 min   Stress: No Stress Concern Present    Feeling of Stress : Only a little   Housing Stability: Low Risk     Unable to Pay for Housing in the Last Year: No    Number of Places Lived in the Last Year: 1    Unstable  Housing in the Last Year: No        MEDS   sodium chloride 0.9%   Intravenous Once    ciprofloxacin HCl  250 mg Oral Daily    epoetin hemant-epbx  5,000 Units Subcutaneous Every Mon, Wed, Fri    fluticasone furoate-vilanteroL  1 puff Inhalation Daily    mirtazapine  15 mg Oral QHS    mupirocin   Nasal BID    senna-docusate 8.6-50 mg  1 tablet Oral BID    sevelamer carbonate  800 mg Oral TID PC    tiotropium bromide  2 puff Inhalation Daily                 CONTINOUS INFUSIONS:      Intake/Output Summary (Last 24 hours) at 4/16/2022 1704  Last data filed at 4/16/2022 1519  Gross per 24 hour   Intake 50 ml   Output --   Net 50 ml        HEMODYNAMICS:    Temp:  [97.1 °F (36.2 °C)-98.9 °F (37.2 °C)] 98.9 °F (37.2 °C)  Pulse:  [63-83] 83  Resp:  [16-20] 20  SpO2:  [95 %-100 %] 97 %  BP: (104-143)/(53-65) 122/65   General :   Weakness  Fatigue  No SOB  No cough  No CP  No nausea  No vomiting  No diarrhea  Cardiology : pulse 63  Pulmonary : diminished breath sounds   Abdomen soft  Extremities : no  edema   Skin: dry   LABS   Lab Results   Component Value Date    WBC 8.35 04/16/2022    HGB 9.9 (L) 04/16/2022    HCT 30.8 (L) 04/16/2022    MCV 97 04/16/2022     04/16/2022        Recent Labs   Lab 04/16/22  0652   GLU 75   CALCIUM 9.0   ALBUMIN 3.1*   PROT 6.4      K 4.5   CO2 26      BUN 34*   CREATININE 2.1*   ALKPHOS 158*   ALT 18   AST 14   BILITOT 0.5      Lab Results   Component Value Date    .0 (H) 12/28/2018    CALCIUM 9.0 04/16/2022    PHOS 3.3 04/16/2022      Lab Results   Component Value Date    IRON 13 (L) 02/12/2020    TIBC 462 (H) 02/12/2020    FERRITIN 148 07/17/2019        ABG  No results for input(s): PH, PO2, PCO2, HCO3, BE in the last 168 hours.      IMAGING:  CXR    ASSESSMENT / PLAN  ESRD  Clotted left upper arm AV graft  S/p Right IJ tunneled catheter placement  Metabolic bone disease  Anemia multifactorial  Hb 9.9  Poor nutrition  Supplements  /64  Dialysis

## 2022-04-16 NOTE — CONSULTS
Today`s Date: 4/15/2022     Admit Date: 4/15/2022    Admitting Physician: Rosa Del Toro MD    Patient`s Name: Katie Quevedo , 76 y.o. female    Reason for consultation  Recurrent clotted graft lleft upper arm  Patient Active Problem List:     Melanoma     Chronic pain     Vitamin deficiency     Cervical post-laminectomy syndrome     Lumbar postlaminectomy syndrome     Lumbosacral spondylosis without myelopathy     Isolated cervical dystonia     Primary osteoarthritis of both knees     Subdeltoid bursitis, L>R.     Other fragments of torsion dystonia     Nuclear sclerosis - Both Eyes     Rotator cuff tear, right     Biceps tendonitis     Osteoarthritis, hip, bilateral     Lumbar radiculopathy, BLE     Cervical radiculopathy, BUE     Osteoporosis     Iron deficiency anemia     Essential hypertension     Atrophy of left kidney     Kidney cysts     History of colon polyps     Acute on chronic respiratory failure with hypoxia     Pleural effusion, left     Pericardial effusion     Shortness of breath     Chronic respiratory failure with hypoxia, on home O2 therapy     Lipoma of torso     Chronic diastolic heart failure     COPD (chronic obstructive pulmonary disease)     Non-intractable vomiting with nausea     Clotted renal dialysis arteriovenous graft     Diastolic dysfunction, left ventricle     Acute congestive heart failure     ESRD (end stage renal disease) on dialysis     Diarrhea     Dialysis AV fistula malfunction, sequela     Medication management     SOB (shortness of breath)     Acute on chronic heart failure     Pleural effusion     Orthopnea     Secondary hyperparathyroidism     Clotted dialysis access      Past Medical History:  02/10/2020: Acute congestive heart failure  No date: Anemia  No date: Bilateral renal cysts  No date: Cataract  7/26/2012: Chronic LBP  No date: Chronic pain  No date: CKD (chronic kidney disease), stage IV  2013: Colon polyp  No date: COPD (chronic obstructive pulmonary  disease)  No date: Dehydration  No date: Encounter for blood transfusion  No date: HTN (hypertension)  No date: Lumbar spondylosis  No date: Melanoma      Comment:  of the lip  No date: Metabolic bone disease  No date: Migraines, neuralgic  No date: Osteoporosis  No date: Primary osteoarthritis of both knees      Comment:  s/p Rt TKA  No date: Pulmonary embolism with infarction  1972: Seizures      Comment:  x1 only  9/27/2012: Subdeltoid bursitis, L>R.  No date: Ulcer  No date: Vitamin D deficiency disease    Past Surgical History:  No date: BLADDER SUSPENSION  11/18/13: CATARACT EXTRACTION      Comment:  left eye  No date: CERVICAL LAMINECTOMY      Comment:  x3, fusion x1  2009: COLONOSCOPY  1/3/2022: DECLOTTING OF ARTERIOVENOUS GRAFT; Left      Comment:  Procedure: [id number];  Surgeon: Lindsey Louie MD;  Location: [facility];  Service: Vascular;                 Laterality: Left;  2/8/2022: DECLOTTING OF ARTERIOVENOUS GRAFT; Left      Comment:  Procedure: [id number];  Surgeon: Lindsey Louie MD;  Location: [facility];  Service: Vascular;                 Laterality: Left;  4/8/2022: DECLOTTING OF ARTERIOVENOUS GRAFT; Left      Comment:  Procedure: [id number];  Surgeon: Lindsey Louie MD;  Location: [facility];  Service: Vascular;                 Laterality: Left;  2/27/2020: FISTULOGRAM; N/A      Comment:  Procedure: Fistulogram;  Surgeon: Noe Benitez MD;                 Location: [facility] CATH LAB/EP;  Service: Cardiology;                 Laterality: N/A;  No date: HYSTERECTOMY  2001: JOINT REPLACEMENT      Comment:  total right knee   No date: LUMBAR LAMINECTOMY      Comment:  x 3, fusion x1  No date: OOPHORECTOMY  3/9/2020: PERIPHERAL ANGIOGRAPHY; N/A      Comment:  Procedure: Peripheral angiography;  Surgeon: Jacinto Henriquez MD;  Location: [facility] CATH LAB/EP;  Service:                Cardiology;  Laterality:  N/A;  1/21/2020: PLACEMENT OF ARTERIOVENOUS GRAFT; Left      Comment:  Procedure: INSERTION, GRAFT, ARTERIOVENOUS;  Surgeon:                Lindsey Louie MD;  Location: [facility] OR;  Service:                General;  Laterality: Left;  2/3/2020: THROMBECTOMY; Left      Comment:  Procedure: THROMBECTOMY;  Surgeon: Lindsey Louie MD;  Location: [facility] OR;  Service: General;  Laterality:                Left;  3/9/2020: THROMBECTOMY      Comment:  Procedure: Thrombectomy;  Surgeon: Jacinto Henriquez MD;               Location: [facility] CATH LAB/EP;  Service: Cardiology;;  4/7/2022: THROMBECTOMY; Left      Comment:  Procedure: THROMBECTOMY;  Surgeon: Lindsey Louie MD;  Location: [facility] OR;  Service: General;  Laterality:                Left;    Prior to Admission medications :  Medication acetaminophen (TYLENOL) 325 MG tablet, Sig Take 1 tablet (325 mg total) by mouth every 6 (six) hours as needed for Pain., Start Date 2/8/22, End Date , Taking? Yes, Authorizing Provider Lindsey Louie MD    Medication albuterol (VENTOLIN HFA) 90 mcg/actuation inhaler, Sig Inhale 2 puffs into the lungs every 6 (six) hours as needed for Wheezing or Shortness of Breath. Rescue, Start Date 4/11/22, End Date , Taking? Yes, Authorizing Provider Kingston Verduzco MD    Medication albuterol-ipratropium (DUO-NEB) 2.5 mg-0.5 mg/3 mL nebulizer solution, Sig Take 3 mLs by nebulization every 6 (six) hours as needed for Shortness of Breath. Rescue, Start Date 10/29/21, End Date 10/29/22, Taking? Yes, Authorizing Provider Giancarlo Rust MD    Medication allopurinoL (ZYLOPRIM) 100 MG tablet, Sig Take 1 tablet (100 mg total) by mouth on Tuesday, Thursday, Saturday, and Sunday., Start Date 2/14/20, End Date , Taking? Yes, Authorizing Provider Aura Diggs MD    Medication busPIRone (BUSPAR) 10 MG tablet, Sig Take 10 mg by mouth once daily., Start Date 5/18/21, End Date , Taking? Yes, Authorizing  Provider Historical Provider    Medication ciprofloxacin HCl (CIPRO) 250 MG tablet, Sig Take 1 tablet by mouth every day for 7 days, Start Date 4/13/22, End Date , Taking? Yes, Authorizing Provider     Medication diphenhydrAMINE (BENADRYL) 25 mg capsule, Sig Take 25 mg by mouth every 6 (six) hours as needed for Itching., Start Date , End Date , Taking? Yes, Authorizing Provider Historical Provider    Medication epoetin hemant-epbx (RETACRIT) 10,000 unit/mL imjection, Sig Inject 0.5 mLs (5,000 Units total) into the skin every Mon, Wed, Fri., Start Date 3/2/20, End Date , Taking? Yes, Authorizing Provider Ramos Navarro,     Medication fluticasone-umeclidin-vilanter (TRELEGY ELLIPTA) 200-62.5-25 mcg inhaler, Sig Inhale 1 puff into the lungs once daily., Start Date 4/8/22, End Date , Taking? Yes, Authorizing Provider Kingston Verduzco MD    Medication HYDROcodone-acetaminophen (NORCO)  mg per tablet, Sig Take 1 tablet by mouth 3 (three) times daily as needed for Pain., Start Date 3/17/22, End Date 4/17/22, Taking? Yes, Authorizing Provider Pushpa Mcmahon MD    Medication mirtazapine (REMERON) 15 MG tablet, Sig Take 1 tablet (15 mg total) by mouth every evening., Start Date 10/20/21, End Date 10/20/22, Taking? Yes, Authorizing Provider Sonu Martínez MD    Medication sevelamer carbonate (RENVELA) 800 mg Tab, Sig Take 1 tablet (800 mg total) by mouth after meals as needed., Start Date 2/18/22, End Date , Taking? Yes, Authorizing Provider Alta Galvez NP    Medication B complex-vitamin C-folic acid (LINDA-RADHA) 0.8 mg Tab, Sig Take 1 tablet (0.8 mg total) by mouth once daily.Patient not taking: No sig reported, Start Date 11/12/20, End Date 4/15/22, Taking? , Authorizing Provider Rachel Alexander NP    Medication clonazePAM (KLONOPIN) 1 MG tablet, Sig TAKE 1 TABLET BY MOUTH 3 TIMES WEEKLY ON DIALYSIS DAYS, Start Date 1/28/22, End Date 4/15/22, Taking? , Authorizing Provider     Medication diclofenac  sodium (VOLTAREN) 1 % Gel, Sig Apply 2 g topically 3 (three) times daily as needed (apply)., Start Date 10/28/21, End Date 4/15/22, Taking? , Authorizing Provider Pushpa Mcmahon MD    Medication lidocaine-prilocaine (EMLA) cream, Sig Apply topically to left arm prior to dialysis.Patient not taking: No sig reported, Start Date 2/14/20, End Date 4/15/22, Taking? , Authorizing Provider Aura Diggs MD    Medication zolpidem (AMBIEN) 5 MG Tab, Sig Take 5 mg by mouth nightly., Start Date 11/11/20, End Date 4/15/22, Taking? , Authorizing Provider Historical Provider      No current facility-administered medications on file prior to encounter.  Current Outpatient Medications on File Prior to Encounter:  acetaminophen (TYLENOL) 325 MG tablet, Take 1 tablet (325 mg total) by mouth every 6 (six) hours as needed for Pain., Disp: 10 tablet, Rfl: 0  albuterol (VENTOLIN HFA) 90 mcg/actuation inhaler, Inhale 2 puffs into the lungs every 6 (six) hours as needed for Wheezing or Shortness of Breath. Rescue, Disp: 18 g, Rfl: 3  albuterol-ipratropium (DUO-NEB) 2.5 mg-0.5 mg/3 mL nebulizer solution, Take 3 mLs by nebulization every 6 (six) hours as needed for Shortness of Breath. Rescue, Disp: 180 mL, Rfl: 11  allopurinoL (ZYLOPRIM) 100 MG tablet, Take 1 tablet (100 mg total) by mouth on Tuesday, Thursday, Saturday, and Sunday., Disp: 30 tablet, Rfl: 0  busPIRone (BUSPAR) 10 MG tablet, Take 10 mg by mouth once daily., Disp: , Rfl:   ciprofloxacin HCl (CIPRO) 250 MG tablet, Take 1 tablet by mouth every day for 7 days, Disp: 7 tablet, Rfl: 0  diphenhydrAMINE (BENADRYL) 25 mg capsule, Take 25 mg by mouth every 6 (six) hours as needed for Itching., Disp: , Rfl:   epoetin hemant-epbx (RETACRIT) 10,000 unit/mL imjection, Inject 0.5 mLs (5,000 Units total) into the skin every Mon, Wed, Fri., Disp: , Rfl:   fluticasone-umeclidin-vilanter (TRELEGY ELLIPTA) 200-62.5-25 mcg inhaler, Inhale 1 puff into the lungs once daily., Disp: 60 each,  Rfl: 11  HYDROcodone-acetaminophen (NORCO)  mg per tablet, Take 1 tablet by mouth 3 (three) times daily as needed for Pain., Disp: 90 tablet, Rfl: 0  mirtazapine (REMERON) 15 MG tablet, Take 1 tablet (15 mg total) by mouth every evening., Disp: 30 tablet, Rfl: 11  sevelamer carbonate (RENVELA) 800 mg Tab, Take 1 tablet (800 mg total) by mouth after meals as needed., Disp: 90 tablet, Rfl: 11  [DISCONTINUED] B complex-vitamin C-folic acid (LINDA-RADHA) 0.8 mg Tab, Take 1 tablet (0.8 mg total) by mouth once daily. (Patient not taking: No sig reported), Disp: 30 tablet, Rfl: 11  [DISCONTINUED] clonazePAM (KLONOPIN) 1 MG tablet, TAKE 1 TABLET BY MOUTH 3 TIMES WEEKLY ON DIALYSIS DAYS, Disp: 15 tablet, Rfl: 2  [DISCONTINUED] diclofenac sodium (VOLTAREN) 1 % Gel, Apply 2 g topically 3 (three) times daily as needed (apply)., Disp: 300 g, Rfl: 3  [DISCONTINUED] lidocaine-prilocaine (EMLA) cream, Apply topically to left arm prior to dialysis. (Patient not taking: No sig reported), Disp: 30 g, Rfl: 0  [DISCONTINUED] zolpidem (AMBIEN) 5 MG Tab, Take 5 mg by mouth nightly., Disp: , Rfl:          Review of patient's allergies indicates:   -- Aspirin     --  Other reaction(s): hx of ulcers   -- Tetracycline -- Swelling    --  Other reaction(s): Swelling   -- Penicillins -- Rash    --  Other reaction(s): Hives             Other reaction(s): Rash             Other reaction(s): Rash             Other reaction(s): Hives    Social History:   reports that she has quit smoking. Her smoking use included cigarettes. She quit smokeless tobacco use about 7 years ago. She reports previous alcohol use. She reports that she does not use drugs.     Review of patient's family history indicates:  Problem: Arthritis      Relation: Mother          Age of Onset: (Not Specified)  Problem: Stroke      Relation: Mother          Age of Onset: (Not Specified)  Problem: Hypertension      Relation: Father          Age of Onset: (Not Specified)  Problem:  Cancer      Relation: Father          Age of Onset: (Not Specified)  Problem: Cataracts      Relation: Father          Age of Onset: (Not Specified)  Problem: Diabetes      Relation: Maternal Aunt          Age of Onset: (Not Specified)  Problem: Hypertension      Relation: Maternal Grandfather          Age of Onset: (Not Specified)  Problem: Heart disease      Relation: Maternal Grandfather          Age of Onset: (Not Specified)  Problem: Heart attack      Relation: Maternal Grandfather          Age of Onset: (Not Specified)  Problem: Cataracts      Relation: Sister          Age of Onset: (Not Specified)  Problem: Glaucoma      Relation: Cousin          Age of Onset: (Not Specified)      PHYSICAL EXAMINATION  Temp:  [97.1 °F (36.2 °C)-98 °F (36.7 °C)] 97.1 °F (36.2 °C)  Pulse:  [69-85] 69  Resp:  [16-20] 16  SpO2:  [97 %-100 %] 97 %  BP: (115-118)/(59-78) 115/61    General Condition:   alert x 3     Head & Neck  Anemia: None  Jaundice: None  Neck vein: Not distended  Carotid Bruits: none  Lymph nodes: none palpable  Thyroid: normal    Chest: normal    Heart: normal    Rt. Breast: not examined  Lt. Breast: not examined  Axillary lymph nodes: none    Abdomen: Soft,  None tender with no palpable mass or organ  Hernia: none    Rectal: Defered    Extremities: normal    Vascular: normal    Specific focus Examination    Imp: clotted graft left upper arm, crf 5    Plan: inertion perm cath in am

## 2022-04-16 NOTE — SUBJECTIVE & OBJECTIVE
Interval History: Lying in bed. Complains of neck pain -- takes Norco chronically for this. Otherwise she has no complaints. Denies CP, SOB, weakness, cramping. Awaiting catheter placement and HD.  Anticipate dc this afternoon.    Review of Systems   Constitutional:  Negative for chills, fatigue and fever.   Gastrointestinal:  Negative for abdominal pain, nausea and vomiting.   Genitourinary:  Negative for dysuria.   Musculoskeletal:  Positive for arthralgias, back pain and neck pain.   Skin:  Positive for wound (surgical).   Neurological:  Negative for dizziness, syncope, weakness, light-headedness and headaches.   Psychiatric/Behavioral:  Negative for agitation, behavioral problems and confusion.    Objective:     Vital Signs (Most Recent):  Temp: 97.4 °F (36.3 °C) (04/16/22 0739)  Pulse: 71 (04/16/22 0739)  Resp: 18 (04/16/22 0845)  BP: (!) 125/57 (04/16/22 0739)  SpO2: 98 % (04/16/22 0739)   Vital Signs (24h Range):  Temp:  [97.1 °F (36.2 °C)-98 °F (36.7 °C)] 97.4 °F (36.3 °C)  Pulse:  [63-85] 71  Resp:  [16-20] 18  SpO2:  [97 %-100 %] 98 %  BP: (104-129)/(57-78) 125/57     Weight: 43.7 kg (96 lb 5.5 oz)  Body mass index is 17.62 kg/m².    Intake/Output Summary (Last 24 hours) at 4/16/2022 0857  Last data filed at 4/15/2022 1618  Gross per 24 hour   Intake --   Output 300 ml   Net -300 ml      Physical Exam  Vitals reviewed.   Eyes:      Pupils: Pupils are equal, round, and reactive to light.   Cardiovascular:      Rate and Rhythm: Normal rate and regular rhythm.      Pulses: Normal pulses.      Heart sounds: Normal heart sounds.   Pulmonary:      Effort: Pulmonary effort is normal.      Breath sounds: Normal breath sounds.   Musculoskeletal:      Right lower leg: No edema.      Left lower leg: No edema.   Skin:     General: Skin is warm and dry.   Neurological:      General: No focal deficit present.      Mental Status: She is alert and oriented to person, place, and time.   Psychiatric:         Mood and  Affect: Mood normal.         Behavior: Behavior normal.       Significant Labs: All pertinent labs within the past 24 hours have been reviewed.  BMP:   Recent Labs   Lab 04/16/22  0652   GLU 75      K 4.5      CO2 26   BUN 34*   CREATININE 2.1*   CALCIUM 9.0   MG 2.1     CMP:   Recent Labs   Lab 04/15/22  1338 04/16/22  0652    140   K 3.9 4.5    105   CO2 26 26   GLU 69* 75   BUN 34* 34*   CREATININE 2.2* 2.1*   CALCIUM 9.5 9.0   PROT 7.8 6.4   ALBUMIN 3.8 3.1*   BILITOT 0.6 0.5   ALKPHOS 191* 158*   AST 18 14   ALT 20 18   ANIONGAP 13 9   EGFRNONAA 21* 22*       Significant Imaging: I have reviewed all pertinent imaging results/findings within the past 24 hours.  I have reviewed and interpreted all pertinent imaging results/findings within the past 24 hours.

## 2022-04-16 NOTE — HOSPITAL COURSE
Admitted to observation. Underwent catheter placement by Dr. Louie. Received HD 04/16/22 Lab studies and vital signs remained stable throughout admission. Pt is ready for discharge home to resume normal HD, MWF.

## 2022-04-16 NOTE — ANESTHESIA PREPROCEDURE EVALUATION
04/16/2022  Katie Quevedo is a 76 y.o., female PMH COPD (on 2L O2 at home and currently), ESRD on HD (MWF), HTN, and HFpEF.  H/o AVF clotting with multiple thrombectomies. Scheduled port insertion.       TTE 4/22  · The left ventricle is normal in size with normal systolic function.  · The estimated ejection fraction is 55%.  · Normal left ventricular diastolic function.  · Mild mitral regurgitation.  · Mild aortic regurgitation.  · Mild tricuspid regurgitation.  · Mild pulmonic regurgitation.  · Elevated central venous pressure (15 mmHg).  · The estimated PA systolic pressure is 25 mmHg.  · Mild right ventricular enlargement with normal right ventricular systolic function.  · There is abnormal septal wall motion. There is diastolic flattening of the interventricular septum consistent with right ventricle volume overload.  · There is no pulmonary hypertension.        Pre-op Assessment    I have reviewed the Patient Summary Reports.     I have reviewed the Nursing Notes. I have reviewed the NPO Status.   I have reviewed the Medications.     Review of Systems  Anesthesia Hx:  No problems with previous Anesthesia  Denies Family Hx of Anesthesia complications.   Denies Personal Hx of Anesthesia complications.   Social:  Former Smoker, No Alcohol Use    Hematology/Oncology:         -- Anemia:   EENT/Dental:EENT/Dental Normal   Cardiovascular:   Exercise tolerance: poor Hypertension, well controlled CHF    Pulmonary:   COPD, severe Shortness of breath    Renal/:   Chronic Renal Disease, ESRD, Dialysis    Hepatic/GI:  Hepatic/GI Normal    Musculoskeletal:   Arthritis     Neurological:   Seizures    Endocrine:  Endocrine Normal        Physical Exam  General: Alert and Oriented    Airway:  Mallampati: II   Mouth Opening: Normal  TM Distance: Normal  Tongue: Normal    Dental:  Intact    Chest/Lungs:  Decreased Breath  Sounds: left and right      Past Medical History:   Diagnosis Date    Acute congestive heart failure 02/10/2020    Anemia     Bilateral renal cysts     Cataract     Chronic LBP 7/26/2012    Chronic pain     CKD (chronic kidney disease), stage IV     Colon polyp 2013    COPD (chronic obstructive pulmonary disease)     Dehydration     Encounter for blood transfusion     HTN (hypertension)     Lumbar spondylosis     Melanoma     of the lip    Metabolic bone disease     Migraines, neuralgic     Osteoporosis     Primary osteoarthritis of both knees     s/p Rt TKA    Pulmonary embolism with infarction     Seizures 1972    x1 only    Subdeltoid bursitis, L>R. 9/27/2012    Ulcer     Vitamin D deficiency disease      Patient Active Problem List   Diagnosis    Melanoma    Chronic pain    Vitamin deficiency    Cervical post-laminectomy syndrome    Lumbar postlaminectomy syndrome    Lumbosacral spondylosis without myelopathy    Isolated cervical dystonia    Primary osteoarthritis of both knees    Subdeltoid bursitis, L>R.    Other fragments of torsion dystonia    Nuclear sclerosis - Both Eyes    Rotator cuff tear, right    Biceps tendonitis    Osteoarthritis, hip, bilateral    Lumbar radiculopathy, BLE    Cervical radiculopathy, BUE    Osteoporosis    Iron deficiency anemia    Essential hypertension    Atrophy of left kidney    Kidney cysts    History of colon polyps    Acute on chronic respiratory failure with hypoxia    Pleural effusion, left    Pericardial effusion    Shortness of breath    Chronic respiratory failure with hypoxia, on home O2 therapy    Lipoma of torso    Chronic diastolic heart failure    COPD (chronic obstructive pulmonary disease)    Non-intractable vomiting with nausea    Clotted renal dialysis arteriovenous graft    Diastolic dysfunction, left ventricle    Acute congestive heart failure    ESRD (end stage renal disease) on dialysis    Diarrhea     Dialysis AV fistula malfunction, sequela    Medication management    SOB (shortness of breath)    Acute on chronic heart failure    Pleural effusion    Orthopnea    Secondary hyperparathyroidism    Clotted dialysis access     Past Surgical History:   Procedure Laterality Date    BLADDER SUSPENSION      CATARACT EXTRACTION  11/18/13    left eye    CERVICAL LAMINECTOMY      x3, fusion x1    COLONOSCOPY  2009    DECLOTTING OF ARTERIOVENOUS GRAFT Left 1/3/2022    Procedure: [id number]-[id number]-NB;  Surgeon: Lindsey Louie MD;  Location: [facility] OR;  Service: Vascular;  Laterality: Left;    DECLOTTING OF ARTERIOVENOUS GRAFT Left 2/8/2022    Procedure: [id number]-AR;  Surgeon: Lindsey Louie MD;  Location: [facility] OR;  Service: Vascular;  Laterality: Left;    DECLOTTING OF ARTERIOVENOUS GRAFT Left 4/8/2022    Procedure: EHGUMCNOKL-RVDXD-UK;  Surgeon: Lindsey Louie MD;  Location: [facility] OR;  Service: Vascular;  Laterality: Left;    FISTULOGRAM N/A 2/27/2020    Procedure: Fistulogram;  Surgeon: Noe Benitez MD;  Location: [facility] CATH LAB/EP;  Service: Cardiology;  Laterality: N/A;    HYSTERECTOMY      JOINT REPLACEMENT  2001    total right knee     LUMBAR LAMINECTOMY      x 3, fusion x1    OOPHORECTOMY      PERIPHERAL ANGIOGRAPHY N/A 3/9/2020    Procedure: Peripheral angiography;  Surgeon: Jacinto Henriquez MD;  Location: [facility] CATH LAB/EP;  Service: Cardiology;  Laterality: N/A;    PLACEMENT OF ARTERIOVENOUS GRAFT Left 1/21/2020    Procedure: INSERTION, GRAFT, ARTERIOVENOUS;  Surgeon: Lindsey Louie MD;  Location: [facility] OR;  Service: General;  Laterality: Left;    THROMBECTOMY Left 2/3/2020    Procedure: THROMBECTOMY;  Surgeon: Lindsey Louie MD;  Location: [facility] OR;  Service: General;  Laterality: Left;    THROMBECTOMY  3/9/2020    Procedure: Thrombectomy;  Surgeon: Jacinto Henriquez MD;  Location: [facility] CATH LAB/EP;  Service: Cardiology;;    THROMBECTOMY Left 4/7/2022     Procedure: THROMBECTOMY;  Surgeon: Lindsey Louie MD;  Location: [facility] OR;  Service: General;  Laterality: Left;     Current Facility-Administered Medications on File Prior to Visit   Medication Dose Route Frequency Provider Last Rate Last Admin    0.9%  NaCl infusion   Intravenous PRN Aura Diggs MD        0.9%  NaCl infusion   Intravenous Once Aura Diggs MD        acetaminophen tablet 325 mg  325 mg Oral Q6H PRN MIGNON Clements-C   325 mg at 04/16/22 1203    albuterol inhaler 2 puff  2 puff Inhalation Q6H PRN MIGNON Clements-WM        albuterol-ipratropium 2.5 mg-0.5 mg/3 mL nebulizer solution 3 mL  3 mL Nebulization Q6H PRN MIGNON Clements-WM        aluminum-magnesium hydroxide-simethicone 200-200-20 mg/5 mL suspension 30 mL  30 mL Oral QID PRN MIGNON Clements-C   30 mL at 04/15/22 2114    ciprofloxacin HCl tablet 250 mg  250 mg Oral Daily MIGNON Clements-C   250 mg at 04/15/22 1739    dextrose 10% bolus 125 mL  12.5 g Intravenous PRN MIGNON Clements-WM        epoetin hemant-epbx injection 5,000 Units  5,000 Units Subcutaneous Every Mon, Wed, Fri MIGNON Clements-C   5,000 Units at 04/15/22 1740    fluticasone furoate-vilanteroL 100-25 mcg/dose diskus inhaler 1 puff  1 puff Inhalation Daily MIGNON Clements-C   1 puff at 04/16/22 0901    glucagon (human recombinant) injection 1 mg  1 mg Intramuscular PRN MIGNON Clements-WM        glucose chewable tablet 16 g  16 g Oral PRN MIGNON Clements-C        glucose chewable tablet 24 g  24 g Oral PRN MIGNON Clements-C        HYDROcodone-acetaminophen  mg per tablet 1 tablet  1 tablet Oral Q8H PRN MIGNON Clements-C   1 tablet at 04/16/22 0845    magnesium oxide tablet 800 mg  800 mg Oral PRN Jessica Villanueva PA-C        mirtazapine disintegrating tablet 15 mg  15 mg Oral QHS Jessica Villanueva PA-C   15 mg at 04/15/22 2110    mupirocin 2 % ointment   Nasal BID Aura ARREDONDO  MD Caterina   Given at 04/16/22 1159    naloxone 0.4 mg/mL injection 0.02 mg  0.02 mg Intravenous PRN Jessica Villanueva PA-C        ondansetron disintegrating tablet 8 mg  8 mg Oral Q8H PRN Jessica Villanueva PA-C        potassium bicarbonate disintegrating tablet 50 mEq  50 mEq Oral PRN Jessica Villanueva PA-C        senna-docusate 8.6-50 mg per tablet 1 tablet  1 tablet Oral BID Jessica Villanueva PA-C        sevelamer carbonate tablet 800 mg  800 mg Oral TID PC Jessica Villanueva PA-C   800 mg at 04/15/22 1739    sodium chloride 0.9% bolus 250 mL  250 mL Intravenous PRN Aura Diggs MD        sodium chloride 0.9% flush 10 mL  10 mL Intravenous Q12H PRN Jessica Villanueva PA-C        tiotropium bromide 1.25 mcg/actuation inhaler 2 puff  2 puff Inhalation Daily Jessica Villanueva PA-C         Current Outpatient Medications on File Prior to Visit   Medication Sig Dispense Refill    acetaminophen (TYLENOL) 325 MG tablet Take 1 tablet (325 mg total) by mouth every 6 (six) hours as needed for Pain. 10 tablet 0    albuterol (VENTOLIN HFA) 90 mcg/actuation inhaler Inhale 2 puffs into the lungs every 6 (six) hours as needed for Wheezing or Shortness of Breath. Rescue 18 g 3    albuterol-ipratropium (DUO-NEB) 2.5 mg-0.5 mg/3 mL nebulizer solution Take 3 mLs by nebulization every 6 (six) hours as needed for Shortness of Breath. Rescue 180 mL 11    allopurinoL (ZYLOPRIM) 100 MG tablet Take 1 tablet (100 mg total) by mouth on Tuesday, Thursday, Saturday, and Sunday. 30 tablet 0    busPIRone (BUSPAR) 10 MG tablet Take 10 mg by mouth once daily.      ciprofloxacin HCl (CIPRO) 250 MG tablet Take 1 tablet by mouth every day for 7 days 7 tablet 0    diphenhydrAMINE (BENADRYL) 25 mg capsule Take 25 mg by mouth every 6 (six) hours as needed for Itching.      epoetin hemant-epbx (RETACRIT) 10,000 unit/mL imjection Inject 0.5 mLs (5,000 Units total) into the skin every Mon, Wed, Fri.       fluticasone-umeclidin-vilanter (TRELEGY ELLIPTA) 200-62.5-25 mcg inhaler Inhale 1 puff into the lungs once daily. 60 each 11    HYDROcodone-acetaminophen (NORCO)  mg per tablet Take 1 tablet by mouth 3 (three) times daily as needed for Pain. 90 tablet 0    mirtazapine (REMERON) 15 MG tablet Take 1 tablet (15 mg total) by mouth every evening. 30 tablet 11    sevelamer carbonate (RENVELA) 800 mg Tab Take 1 tablet (800 mg total) by mouth after meals as needed. 90 tablet 11     Review of patient's allergies indicates:   Allergen Reactions    Aspirin      Other reaction(s): hx of ulcers    Tetracycline Swelling     Other reaction(s): Swelling    Penicillins Rash     Other reaction(s): Hives  Other reaction(s): Rash  Other reaction(s): Rash  Other reaction(s): Hives         Anesthesia Plan  Type of Anesthesia, risks & benefits discussed:    Anesthesia Type: MAC, Regional  Intra-op Monitoring Plan: Standard ASA Monitors  Post Op Pain Control Plan: multimodal analgesia and IV/PO Opioids PRN  Induction:  IV  Informed Consent: Informed consent signed with the Patient and all parties understand the risks and agree with anesthesia plan.  All questions answered.   ASA Score: 3  Day of Surgery Review of History & Physical: H&P Update referred to the surgeon/provider.    Ready For Surgery From Anesthesia Perspective.     .

## 2022-04-16 NOTE — OP NOTE
[facility] Surg  Brief Operative Note    SUMMARY     Surgery Date: 4/16/2022     Surgeon(s) and Role:     * Lindsey Louie MD - Primary    Assisting Surgeon: None    Pre-op Diagnosis:  ESRD on hemodialysis [N18.6, Z99.2]  Thrombosis of arteriovenous fistula, initial encounter [T82.868A]    Post-op Diagnosis:  Post-Op Diagnosis Codes:     * ESRD on hemodialysis [N18.6, Z99.2]     * Thrombosis of arteriovenous fistula, initial encounter [T82.868A]    Procedure(s) (LRB):  INSERTION-CATHETER-RICKI (Right)  Per cath with us and flouroscope  Anesthesia: Local MAC    Operative Findings:   Operative Note       Surgery Date: 4/16/2022     Surgeon(s) and Role:     * Lindsey Louie MD - Primary    Pre-op Diagnosis:  ESRD on hemodialysis [N18.6, Z99.2]  Thrombosis of arteriovenous fistula, initial encounter [T82.868A]    Post-op Diagnosis: Post-Op Diagnosis Codes:     * ESRD on hemodialysis [N18.6, Z99.2]     * Thrombosis of arteriovenous fistula, initial encounter [T82.868A]    Procedure(s) (LRB):  INSERTION-CATHETER-RICKI (Right)  With us and fluoroscope insertion perm cath  Anesthesia: Local MAC    Procedure in Detail/Findings:    After satisfactory IV sedation, the patient was   positioned.  Right side of the neck and chest was prepped and draped in normal   sterile manner using ChloraPrep.  Appropriate area was selected with ultrasound   infiltrated using 1% Xylocaine solution.  Micropuncture needle was introduced   into the internal jugular vein.  Guidewire was positioned in superior vena cava.    Tract was dilated with vein dilator.  Pre-heparinized PermCath was brought   under a subcutaneous tunnel and with help of a peel-away sheath and fluoroscopy,   catheter was positioned in the superior vena cava, sutured in place using 2-0   silk suture.  Both limbs of the catheter were irrigated with heparin flush, 1000   unit heparin was placed in each limb of the catheter.  Final x-ray showed good    positioning of the catheter.  No evidence of pneumothorax.  Estimated blood loss   was 30 mL.  No specimen was removed.    Estimated Blood Loss:20 cc        Specimens (From admission, onward)    None        Implants:   Implant Name Type Inv. Item Serial No.  Lot No. LRB No. Used Action   CATH [employer]YSIS [id number] 23 - [id number]  CATH D[employer]YSIS [id number] 23  [employer] [place] [id number] Right 1 Implanted              Disposition: PACU - hemodynamically stable.           Condition: Good    Attestation:  I was present and scrubbed for the entire procedure.                 Estimated Blood Loss: 20 cc  Estimated Blood Loss has been documented.         Specimens:   Specimen (24h ago, onward)            None          [id number]

## 2022-04-16 NOTE — PLAN OF CARE
Problem: Adult Inpatient Plan of Care  Goal: Plan of Care Review  Outcome: Ongoing, Progressing     Problem: Adult Inpatient Plan of Care  Goal: Absence of Hospital-Acquired Illness or Injury  Outcome: Ongoing, Progressing     Patient Is AAOx4 on2L NC. Aware of NPO after midnight. Scheduled and PRN medication administered per MAR. Patient had BM, she reported it was watery. VSS. Safety was maintained. Will cont. To monitor.

## 2022-04-16 NOTE — PROGRESS NOTES
[facility] Medicine  Progress Note    Patient Name: Katie Quevedo  MRN: [id number]  Patient Class: OP- Observation   Admission Date: 4/15/2022  Length of Stay: 0 days  Attending Physician: Rosa Del Toro MD  Primary Care Provider: Kingston Verduzco MD        Subjective:     Principal Problem:Clotted renal dialysis arteriovenous graft        HPI:  Katie Quevedo is a 76 y.o. female with PMHx significant for CHF, COPD w/ chronic respiratory failure on home O2, ESRD on HD (MWF), and h/o recurrent clotted AV fistula. Her last dialysis was Wednesday, she went for HD today but was unable to access her fistula. She was recently admitted for a similar issue and underwent declotting thrombectomy on 04/07/22 and 04/08/22 by Dr. Louie. She denies any other complaints today; specifically denies pain, CP, SOB, weakness, headache, nausea, vomiting, cramping, numbness. On presentation, vital signs stable. Laboratory studies without electrolyte abnormalities. EKG unchanged from prior. Discussed case with Dr. Louie and Dr. Diggs. She will be admitted under observation to  service. Plans for dialysis catheter placement and HD tomorrow.       Overview/Hospital Course:  Admitted to observation. Awaiting dialysis catheter placement and subsequent HD. Lab studies remain stable.       Interval History: Lying in bed. Complains of neck pain -- takes Norco chronically for this. Otherwise she has no complaints. Denies CP, SOB, weakness, cramping. Awaiting catheter placement and HD.  Anticipate dc this afternoon.    Review of Systems   Constitutional:  Negative for chills, fatigue and fever.   Gastrointestinal:  Negative for abdominal pain, nausea and vomiting.   Genitourinary:  Negative for dysuria.   Musculoskeletal:  Positive for arthralgias, back pain and neck pain.   Skin:  Positive for wound (surgical).   Neurological:  Negative for dizziness, syncope, weakness, light-headedness and headaches.   Psychiatric/Behavioral:   Negative for agitation, behavioral problems and confusion.    Objective:     Vital Signs (Most Recent):  Temp: 97.4 °F (36.3 °C) (04/16/22 0739)  Pulse: 71 (04/16/22 0739)  Resp: 18 (04/16/22 0845)  BP: (!) 125/57 (04/16/22 0739)  SpO2: 98 % (04/16/22 0739)   Vital Signs (24h Range):  Temp:  [97.1 °F (36.2 °C)-98 °F (36.7 °C)] 97.4 °F (36.3 °C)  Pulse:  [63-85] 71  Resp:  [16-20] 18  SpO2:  [97 %-100 %] 98 %  BP: (104-129)/(57-78) 125/57     Weight: 43.7 kg (96 lb 5.5 oz)  Body mass index is 17.62 kg/m².    Intake/Output Summary (Last 24 hours) at 4/16/2022 0857  Last data filed at 4/15/2022 1618  Gross per 24 hour   Intake --   Output 300 ml   Net -300 ml      Physical Exam  Vitals reviewed.   Eyes:      Pupils: Pupils are equal, round, and reactive to light.   Cardiovascular:      Rate and Rhythm: Normal rate and regular rhythm.      Pulses: Normal pulses.      Heart sounds: Normal heart sounds.   Pulmonary:      Effort: Pulmonary effort is normal.      Breath sounds: Normal breath sounds.   Musculoskeletal:      Right lower leg: No edema.      Left lower leg: No edema.   Skin:     General: Skin is warm and dry.   Neurological:      General: No focal deficit present.      Mental Status: She is alert and oriented to person, place, and time.   Psychiatric:         Mood and Affect: Mood normal.         Behavior: Behavior normal.       Significant Labs: All pertinent labs within the past 24 hours have been reviewed.  BMP:   Recent Labs   Lab 04/16/22  0652   GLU 75      K 4.5      CO2 26   BUN 34*   CREATININE 2.1*   CALCIUM 9.0   MG 2.1     CMP:   Recent Labs   Lab 04/15/22  1338 04/16/22  0652    140   K 3.9 4.5    105   CO2 26 26   GLU 69* 75   BUN 34* 34*   CREATININE 2.2* 2.1*   CALCIUM 9.5 9.0   PROT 7.8 6.4   ALBUMIN 3.8 3.1*   BILITOT 0.6 0.5   ALKPHOS 191* 158*   AST 18 14   ALT 20 18   ANIONGAP 13 9   EGFRNONAA 21* 22*       Significant Imaging: I have reviewed all pertinent imaging  results/findings within the past 24 hours.  I have reviewed and interpreted all pertinent imaging results/findings within the past 24 hours.      Assessment/Plan:      * Clotted renal dialysis arteriovenous graft  - recurrent issue, last declotting procedure 04/08  - Consult general surgery and nephrology; plan for placement of catheter tomorrow and HD to follow  - to OR today for catheter placement   - anticipate dc after HD can be performed      ESRD (end stage renal disease) on dialysis  - HD, MWF. Last session Wednesday 04/13  - Clotted fistula, unable to have HD today  - Labs and VS stable  - continue home sevelamer  - HD today after port placement     Chronic respiratory failure with hypoxia, on home O2 therapy  - chronic, stable  - continue home inhales and nebulizer  - continue supplemental O2 at 2L NC for SpO2 >88%        VTE Risk Mitigation (From admission, onward)         Ordered     heparin (porcine) injection 5,000 Units  Every 8 hours         04/15/22 1503     IP VTE HIGH RISK PATIENT  Once         04/15/22 1503     Place sequential compression device  Until discontinued         04/15/22 1503                Discharge Planning   LAITH:      Code Status: Full Code   Is the patient medically ready for discharge?:     Reason for patient still in hospital (select all that apply): Treatment and Consult recommendations                     Jessica Villanueva PA-C  Department of Hospital Medicine   [facility] Surg

## 2022-04-16 NOTE — DISCHARGE SUMMARY
[facility] Medicine  Discharge Summary      Patient Name: Katie Quevedo  MRN: [id number]  Patient Class: OP- Observation  Admission Date: 4/15/2022  Hospital Length of Stay: 0 days  Discharge Date and Time:  04/16/2022 4:19 PM  Attending Physician: Adelina Sotelo MD   Discharging Provider: Jessica Man PA-C  Primary Care Provider: Kingston Verduzco MD      HPI:   Katie Quevedo is a 76 y.o. female with PMHx significant for CHF, COPD w/ chronic respiratory failure on home O2, ESRD on HD (MWF), and h/o recurrent clotted AV fistula. Her last dialysis was Wednesday, she went for HD today but was unable to access her fistula. She was recently admitted for a similar issue and underwent declotting thrombectomy on 04/07/22 and 04/08/22 by Dr. Louie. She denies any other complaints today; specifically denies pain, CP, SOB, weakness, headache, nausea, vomiting, cramping, numbness. On presentation, vital signs stable. Laboratory studies without electrolyte abnormalities. EKG unchanged from prior. Discussed case with Dr. Louie and Dr. Diggs. She will be admitted under observation to  service. Plans for dialysis catheter placement and HD tomorrow.       Procedure(s) (LRB):  INSERTION-CATHETER-RICKI (Right)      Hospital Course:   Admitted to observation. Underwent catheter placement by Dr. Louie. Received HD 04/16/22 Lab studies and vital signs remained stable throughout admission. Pt is ready for discharge home to resume normal HD, MWF.        Goals of Care Treatment Preferences:  Code Status: Full Code      Consults:   Consults (From admission, onward)        Status Ordering Provider     IP consult to case management  Once        Provider:  (Not yet assigned)    Acknowledged ADELINA SOTELO     [facility]  Once        Provider:  Aura Diggs MD    Acknowledged JESSICA MAN     Inpatient consult to General surgery  Once        Provider:  (Not yet assigned)     Acknowledged HANNA MAN          No new Assessment & Plan notes have been filed under this hospital service since the last note was generated.  Service: Hospital Medicine    Final Active Diagnoses:    Diagnosis Date Noted POA    PRINCIPAL PROBLEM:  Clotted renal dialysis arteriovenous graft [T82.868A] 02/03/2020 Yes    ESRD (end stage renal disease) on dialysis [N18.6, Z99.2] 02/19/2020 Not Applicable     Chronic    Chronic respiratory failure with hypoxia, on home O2 therapy [J96.11, Z99.81] 07/18/2019 Not Applicable     Chronic      Problems Resolved During this Admission:       Discharged Condition: fair    Disposition: Home or Self Care    Follow Up:   Follow-up Information     Clemencia Gambino MD Follow up.    Specialty: Internal Medicine  Contact information:  [address] [facility] [facility] [address]  [address]  [facility] [address] [phone]                       Patient Instructions:      Diet Cardiac     Notify your health care provider if you experience any of the following:  persistent nausea and vomiting or diarrhea     Notify your health care provider if you experience any of the following:  severe uncontrolled pain     Notify your health care provider if you experience any of the following:  redness, tenderness, or signs of infection (pain, swelling, redness, odor or green/yellow discharge around incision site)     Notify your health care provider if you experience any of the following:  difficulty breathing or increased cough     Activity as tolerated       Significant Diagnostic Studies: Labs:   CMP   Recent Labs   Lab 04/15/22  1338 04/16/22  0652    140   K 3.9 4.5    105   CO2 26 26   GLU 69* 75   BUN 34* 34*   CREATININE 2.2* 2.1*   CALCIUM 9.5 9.0   PROT 7.8 6.4   ALBUMIN 3.8 3.1*   BILITOT 0.6 0.5   ALKPHOS 191* 158*   AST 18 14   ALT 20 18   ANIONGAP 13 9   ESTGFRAFRICA 24* 25*   EGFRNONAA 21* 22*    and CBC   Recent Labs   Lab 04/15/22  1338 04/16/22  0652   WBC 11.89 8.35   HGB 10.8* 9.9*    HCT 34.9* 30.8*    243       Pending Diagnostic Studies:     None         Medications:  Reconciled Home Medications:      Medication List      CONTINUE taking these medications    acetaminophen 325 MG tablet  Commonly known as: TYLENOL  Take 1 tablet (325 mg total) by mouth every 6 (six) hours as needed for Pain.     albuterol 90 mcg/actuation inhaler  Commonly known as: VENTOLIN HFA  Inhale 2 puffs into the lungs every 6 (six) hours as needed for Wheezing or Shortness of Breath. Rescue     albuterol-ipratropium 2.5 mg-0.5 mg/3 mL nebulizer solution  Commonly known as: DUO-NEB  Take 3 mLs by nebulization every 6 (six) hours as needed for Shortness of Breath. Rescue     allopurinoL 100 MG tablet  Commonly known as: ZYLOPRIM  Take 1 tablet (100 mg total) by mouth on Tuesday, Thursday, Saturday, and Sunday.     busPIRone 10 MG tablet  Commonly known as: BUSPAR  Take 10 mg by mouth once daily.     ciprofloxacin HCl 250 MG tablet  Commonly known as: CIPRO  Take 1 tablet by mouth every day for 7 days     diphenhydrAMINE 25 mg capsule  Commonly known as: BENADRYL  Take 25 mg by mouth every 6 (six) hours as needed for Itching.     epoetin hemant-epbx 10,000 unit/mL imjection  Commonly known as: RETACRIT  Inject 0.5 mLs (5,000 Units total) into the skin every Mon, Wed, Fri.     HYDROcodone-acetaminophen  mg per tablet  Commonly known as: NORCO  Take 1 tablet by mouth 3 (three) times daily as needed for Pain.     mirtazapine 15 MG tablet  Commonly known as: REMERON  Take 1 tablet (15 mg total) by mouth every evening.     sevelamer carbonate 800 mg Tab  Commonly known as: RENVELA  Take 1 tablet (800 mg total) by mouth after meals as needed.     TRELEGY ELLIPTA 200-62.5-25 mcg inhaler  Generic drug: fluticasone-umeclidin-vilanter  Inhale 1 puff into the lungs once daily.            Indwelling Lines/Drains at time of discharge:   Lines/Drains/Airways     Central Venous Catheter Line  Duration                 Hemodialysis AV Graft Left upper arm -- days                Time spent on the discharge of patient: 35 minutes         Jessica Villanueva PA-C  Department of Hospital Medicine  [facility]

## 2022-04-17 NOTE — PLAN OF CARE
Problem: Adult Inpatient Plan of Care  Goal: Plan of Care Review  Outcome: Adequate for Care Transition  Goal: Patient-Specific Goal (Individualized)  Outcome: Adequate for Care Transition  Goal: Absence of Hospital-Acquired Illness or Injury  Outcome: Adequate for Care Transition  Goal: Optimal Comfort and Wellbeing  Outcome: Adequate for Care Transition  Goal: Readiness for Transition of Care  Outcome: Adequate for Care Transition     Problem: Device-Related Complication Risk (Hemodialysis)  Goal: Safe, Effective Therapy Delivery  Outcome: Adequate for Care Transition     Problem: Hemodynamic Instability (Hemodialysis)  Goal: Effective Tissue Perfusion  Outcome: Adequate for Care Transition     Problem: Infection (Hemodialysis)  Goal: Absence of Infection Signs and Symptoms  Outcome: Adequate for Care Transition     Problem: Electrolyte Imbalance (Chronic Kidney Disease)  Goal: Electrolyte Balance  Outcome: Adequate for Care Transition     Problem: Fluid Volume Excess (Chronic Kidney Disease)  Goal: Fluid Balance  Outcome: Adequate for Care Transition     Problem: Renal Function Impairment (Chronic Kidney Disease)  Goal: Minimize Renal Failure Effects  Outcome: Adequate for Care Transition     Problem: Bleeding (Surgery Nonspecified)  Goal: Absence of Bleeding  Outcome: Adequate for Care Transition     Problem: Fluid and Electrolyte Imbalance (Surgery Nonspecified)  Goal: Fluid and Electrolyte Balance  Outcome: Adequate for Care Transition     Problem: Infection (Surgery Nonspecified)  Goal: Absence of Infection Signs and Symptoms  Outcome: Adequate for Care Transition     Problem: Ongoing Anesthesia Effects (Surgery Nonspecified)  Goal: Anesthesia/Sedation Recovery  Outcome: Adequate for Care Transition     Problem: Pain (Surgery Nonspecified)  Goal: Acceptable Pain Control  Outcome: Adequate for Care Transition     Problem: COPD (Chronic Obstructive Pulmonary Disease) Comorbidity  Goal: Maintenance of COPD  Symptom Control  Outcome: Adequate for Care Transition     Problem: Heart Failure Comorbidity  Goal: Maintenance of Heart Failure Symptom Control  Outcome: Adequate for Care Transition     Problem: Fall Injury Risk  Goal: Absence of Fall and Fall-Related Injury  Outcome: Adequate for Care Transition    Patient had dialysis and aware that she is discharged after the dialysis. Patient on her 2 L oxygen. And will use her home oxygen. Going back home, PIV to the RAC was removed. VSS. Awaiting for VN to get the educations for patient.  Tele monitoring is dc'd.

## 2022-04-17 NOTE — NURSING
Patient is transported to dialysis right now. AAOx4 on 2 L of oxygen supplementation via NC. VSS.NAD noted.

## 2022-04-17 NOTE — DISCHARGE INSTRUCTIONS
Discharge orders noted. Additional clinical references attached. Patient's discharge instructions given by bedside RN and reviewed via this VN.  Education provided on new and previous medications, diagnosis, and follow-up appointments.  New medications delivered by pharmacy. Patient verbalized understanding and teach back method was used. Patient's ride/transportation home at bedside. All questions answered. Transport to [facility] requested. Floor nurse notified.

## 2022-04-17 NOTE — NURSING
Patient back at her room, from the dialysis. AAox4 on 2L oxygen 100% saturation. Taken via pulse ox in the dynamap machine. VSS. Pain was decreased after the pain medication administration while in the dialysis.

## 2022-04-17 NOTE — PROGRESS NOTES
04/16/22 2210   Post-Hemodialysis Assessment   Rinseback Volume (mL) 250 mL   Blood Volume Processed (Liters) 45 L   Dialyzer Clearance Clear   Duration of Treatment (minutes) 150 minutes   Additional Fluid Intake (mL) 500 mL   Total UF (mL) 1500 mL   Net Fluid Removal 1000   Patient Response to Treatment Tolerated well   Post-Hemodialysis Comments Blood returned - lines flushed - Tx completed.

## 2022-04-19 ENCOUNTER — TELEPHONE (OUTPATIENT)
Dept: PHYSICAL MEDICINE AND REHAB | Facility: CLINIC | Age: 77
End: 2022-04-19
Payer: MEDICARE

## 2022-04-19 ENCOUNTER — PATIENT OUTREACH (OUTPATIENT)
Dept: ADMINISTRATIVE | Facility: OTHER | Age: 77
End: 2022-04-19
Payer: MEDICARE

## 2022-04-19 NOTE — TELEPHONE ENCOUNTER
----- Message from Loida Haddad sent at 4/19/2022  9:29 AM CDT -----  Contact: Pt  Pt requesting a call back regarding rescheduling appt.. Pt just was released from the hospital..    Confirmed contact info below:  Contact Name: Katie Quevedo  Phone Number: [phone]

## 2022-04-19 NOTE — PROGRESS NOTES
IP Liaison - Final Visit Note    Patient: Katie Quevedo  MRN:  [id number]  Date of Service:  4/19/2022  Completed by:  KING Bermudez    Reason for Visit   Patient presents with    IP Liaison Chart Review     Patient discharged from hospital before KING was able to complete follow-up visit.       Patient Summary     Discharge Date: 4/9/2022  Discharge telephone number/address: [phone] / [address] [place] [address] [address] [place] [id number]  Follow up provider: 4/13/2022 @ 1:30pm / 4/14/2022 @ 3:00pm  Follow up appointments: Kingston Verduzco MD / Lindsey Louie MD  Home Health agency & telephone number: n/a  DME ordered &  name: n/a  Assigned OPCM RN/SW: n/a  Report sent to follow up team (PCP/OPCM) via in basket message: n/a  Community Resources arranged including agency name & contact info: n/a      KING Bermudez

## 2022-04-19 NOTE — TELEPHONE ENCOUNTER
Last Rx refill-----03/17/22  Last office visit--10/28/21  PT CANCEL APPT TODAY @ 10;00  Next office visit--So far there are no available appointments at this time.  The patient was added to the wait list.

## 2022-04-22 ENCOUNTER — TELEPHONE (OUTPATIENT)
Dept: PHYSICAL MEDICINE AND REHAB | Facility: CLINIC | Age: 77
End: 2022-04-22
Payer: MEDICARE

## 2022-04-22 NOTE — TELEPHONE ENCOUNTER
----- Message from Tremontana Chevalier sent at 4/22/2022 10:16 AM CDT -----  Regarding: refil  Contact: pat @ [phone]  Rx Refill/Request    Is this a Refill or New Rx: Refill    Rx Name and Strength:  Norco 10/325    Preferred Pharmacy with phone number:      [facility]  [address] [address]  Phone: [phone] Fax: [phone]    Communication Preference: Please call pt @ [phone]  Additional Information:

## 2022-04-22 NOTE — TELEPHONE ENCOUNTER
Last Rx refill-----03/17/22  Last office visit--10/28/21  Next office visit--So far there are no available appointments at this time.  The patient was added to the wait list.

## 2022-04-25 RX ORDER — HYDROCODONE BITARTRATE AND ACETAMINOPHEN 10; 325 MG/1; MG/1
1 TABLET ORAL 3 TIMES DAILY PRN
Qty: 90 TABLET | Refills: 0 | Status: SHIPPED | OUTPATIENT
Start: 2022-04-25 | End: 2022-05-26 | Stop reason: SDUPTHER

## 2022-05-06 ENCOUNTER — EXTERNAL HOME HEALTH (OUTPATIENT)
Dept: HOME HEALTH SERVICES | Facility: HOSPITAL | Age: 77
End: 2022-05-06
Payer: MEDICARE

## 2022-05-07 RX ORDER — HYDROCODONE BITARTRATE AND ACETAMINOPHEN 10; 325 MG/1; MG/1
1 TABLET ORAL 3 TIMES DAILY PRN
Qty: 90 TABLET | Refills: 0 | Status: CANCELLED | OUTPATIENT
Start: 2022-05-07 | End: 2022-06-06

## 2022-05-23 ENCOUNTER — TELEPHONE (OUTPATIENT)
Dept: FAMILY MEDICINE | Facility: CLINIC | Age: 77
End: 2022-05-23
Payer: MEDICARE

## 2022-05-23 NOTE — TELEPHONE ENCOUNTER
Attempted to call preferred number x3, no voicemail. Left voicemail on cell number to return call to reschedule appointment due to Dr Verduzco being out.

## 2022-05-25 ENCOUNTER — TELEPHONE (OUTPATIENT)
Dept: PHYSICAL MEDICINE AND REHAB | Facility: CLINIC | Age: 77
End: 2022-05-25
Payer: MEDICARE

## 2022-05-25 NOTE — TELEPHONE ENCOUNTER
----- Message from Uzair Kim sent at 5/25/2022 10:47 AM CDT -----  Contact: @[phone]  Rx Refill/Request     Is this a Refill or New Rx:  Yes   Rx Name and Strength:  HYDROcodone-acetaminophen (NORCO)  mg per tablet  Preferred Pharmacy with phone number:     [facility]  [address] [place] [address]  [place] [address]  Phone: [phone] Fax: [phone]

## 2022-05-26 RX ORDER — HYDROCODONE BITARTRATE AND ACETAMINOPHEN 10; 325 MG/1; MG/1
1 TABLET ORAL 3 TIMES DAILY PRN
Qty: 90 TABLET | Refills: 0 | Status: SHIPPED | OUTPATIENT
Start: 2022-05-26 | End: 2022-06-27 | Stop reason: SDUPTHER

## 2022-06-15 ENCOUNTER — HOSPITAL ENCOUNTER (EMERGENCY)
Facility: HOSPITAL | Age: 77
Discharge: HOME | End: 2022-06-15
Attending: EMERGENCY MEDICINE
Payer: MEDICARE

## 2022-06-15 VITALS
SYSTOLIC BLOOD PRESSURE: 120 MMHG | OXYGEN SATURATION: 100 % | DIASTOLIC BLOOD PRESSURE: 56 MMHG | HEART RATE: 87 BPM | WEIGHT: 90 LBS | TEMPERATURE: 98 F | BODY MASS INDEX: 16.56 KG/M2 | RESPIRATION RATE: 24 BRPM | HEIGHT: 62 IN

## 2022-06-15 DIAGNOSIS — T82.49XA OTHER COMPLICATION OF VASCULAR DIALYSIS CATHETER, INITIAL ENCOUNTER: Primary | ICD-10-CM

## 2022-06-15 PROCEDURE — 99282 EMERGENCY DEPT VISIT SF MDM: CPT

## 2022-06-15 PROCEDURE — 63600175 PHARM REV CODE 636 W HCPCS: Performed by: EMERGENCY MEDICINE

## 2022-06-15 RX ORDER — HEPARIN 100 UNIT/ML
2 SYRINGE INTRAVENOUS ONCE
Status: COMPLETED | OUTPATIENT
Start: 2022-06-15 | End: 2022-06-15

## 2022-06-15 RX ORDER — HEPARIN 100 UNIT/ML
2.1 SYRINGE INTRAVENOUS ONCE
Status: COMPLETED | OUTPATIENT
Start: 2022-06-15 | End: 2022-06-15

## 2022-06-15 RX ADMIN — HEPARIN 200 UNITS: 100 SYRINGE at 04:06

## 2022-06-15 RX ADMIN — HEPARIN 210 UNITS: 100 SYRINGE at 04:06

## 2022-06-15 NOTE — ED PROVIDER NOTES
Encounter Date: 6/15/2022       History     Chief Complaint   Patient presents with    device complication     Sent from dialysis unit after unable to flush arterial port of catheter post dialysis. Pt. Received full treatment. Pt. Has a dialysis catheter to her rt. Chest wall.      HPI   This is a 77 y.o. female who has a past medical history of Acute congestive heart failure (02/10/2020), Anemia, Bilateral renal cysts, Cataract, Chronic LBP (7/26/2012), Chronic pain, CKD (chronic kidney disease), stage IV, Colon polyp (2013), COPD (chronic obstructive pulmonary disease), Dehydration, Encounter for blood transfusion, HTN (hypertension), Lumbar spondylosis, Melanoma, Metabolic bone disease, Migraines, neuralgic, Osteoporosis, Primary osteoarthritis of both knees, Pulmonary embolism with infarction, Seizures (1972), Subdeltoid bursitis, L>R. (9/27/2012), Ulcer, and Vitamin D deficiency disease.     The patient presents to the Emergency Department with clotted arterial port of dialysis cath.  Patient received full treatment of dialysis today but dialysis center was unable to flush the arterial side.  Patient received a full treatment.  Patient has no other complaints at this time.      Review of patient's allergies indicates:   Allergen Reactions    Aspirin      Other reaction(s): hx of ulcers    Tetracycline Swelling     Other reaction(s): Swelling    Penicillins Rash     Other reaction(s): Hives  Other reaction(s): Rash  Other reaction(s): Rash  Other reaction(s): Hives     Past Medical History:   Diagnosis Date    Acute congestive heart failure 02/10/2020    Anemia     Bilateral renal cysts     Cataract     Chronic LBP 7/26/2012    Chronic pain     CKD (chronic kidney disease), stage IV     Colon polyp 2013    COPD (chronic obstructive pulmonary disease)     Dehydration     Encounter for blood transfusion     HTN (hypertension)     Lumbar spondylosis     Melanoma     of the lip    Metabolic bone  disease     Migraines, neuralgic     Osteoporosis     Primary osteoarthritis of both knees     s/p Rt TKA    Pulmonary embolism with infarction     Seizures 1972    x1 only    Subdeltoid bursitis, L>R. 9/27/2012    Ulcer     Vitamin D deficiency disease      Past Surgical History:   Procedure Laterality Date    BLADDER SUSPENSION      CATARACT EXTRACTION  11/18/13    left eye    CERVICAL LAMINECTOMY      x3, fusion x1    COLONOSCOPY  2009    DECLOTTING OF ARTERIOVENOUS GRAFT Left 1/3/2022    Procedure: [id number];  Surgeon: Lindsey Louie MD;  Location: [facility] OR;  Service: Vascular;  Laterality: Left;    DECLOTTING OF ARTERIOVENOUS GRAFT Left 2/8/2022    Procedure: [id number];  Surgeon: Lindsey Louie MD;  Location: [facility] OR;  Service: Vascular;  Laterality: Left;    DECLOTTING OF ARTERIOVENOUS GRAFT Left 4/8/2022    Procedure: [id number];  Surgeon: Lindsey Louie MD;  Location: [facility] OR;  Service: Vascular;  Laterality: Left;    FISTULOGRAM N/A 2/27/2020    Procedure: Fistulogram;  Surgeon: Noe Benitez MD;  Location: [facility] CATH LAB/EP;  Service: Cardiology;  Laterality: N/A;    HYSTERECTOMY      JOINT REPLACEMENT  2001    total right knee     LUMBAR LAMINECTOMY      x 3, fusion x1    OOPHORECTOMY      PERIPHERAL ANGIOGRAPHY N/A 3/9/2020    Procedure: Peripheral angiography;  Surgeon: Jacinto Henriquez MD;  Location: [facility] CATH LAB/EP;  Service: Cardiology;  Laterality: N/A;    PLACEMENT OF ARTERIOVENOUS GRAFT Left 1/21/2020    Procedure: INSERTION, GRAFT, ARTERIOVENOUS;  Surgeon: Lindsey Louie MD;  Location: [facility] OR;  Service: General;  Laterality: Left;    PLACEMENT OF DUAL-LUMEN VASCULAR CATHETER Right 4/16/2022    Procedure: INSERTION-CATHETER-RICKI;  Surgeon: Lindsey Louie MD;  Location: [facility] OR;  Service: General;  Laterality: Right;    THROMBECTOMY Left 2/3/2020    Procedure: THROMBECTOMY;  Surgeon: Lindsey Louie MD;   Location: [facility] OR;  Service: General;  Laterality: Left;    THROMBECTOMY  3/9/2020    Procedure: Thrombectomy;  Surgeon: Jacinto Henriquez MD;  Location: [facility] CATH LAB/EP;  Service: Cardiology;;    THROMBECTOMY Left 4/7/2022    Procedure: THROMBECTOMY;  Surgeon: Lindsey Louie MD;  Location: [facility] OR;  Service: General;  Laterality: Left;     Family History   Problem Relation Age of Onset    Arthritis Mother     Stroke Mother     Hypertension Father     Cancer Father     Cataracts Father     Diabetes Maternal Aunt     Hypertension Maternal Grandfather     Heart disease Maternal Grandfather     Heart attack Maternal Grandfather     Cataracts Sister     Glaucoma Cousin      Social History     Tobacco Use    Smoking status: Former Smoker     Types: Cigarettes    Smokeless tobacco: Former User     Quit date: 2/3/2015   Substance Use Topics    Alcohol use: Not Currently     Comment: Rare    Drug use: No     Review of Systems   Respiratory: Negative for shortness of breath.    Cardiovascular: Negative for chest pain.       Physical Exam     Initial Vitals [06/15/22 1527]   BP Pulse Resp Temp SpO2   (!) 120/56 88 (!) 22 97.9 °F (36.6 °C) 96 %      MAP       --         Physical Exam    Nursing note and vitals reviewed.  Constitutional: She appears well-developed and well-nourished. She is not diaphoretic. No distress.   HENT:   Head: Normocephalic and atraumatic.   Mouth/Throat: Oropharynx is clear and moist.   Eyes: Conjunctivae are normal.   Cardiovascular: Normal rate, regular rhythm and intact distal pulses.   Pulmonary/Chest: No respiratory distress.   Velez catheter appears C/D/I. Art line with partial blood in line. Venous line appears clear with saline.   Musculoskeletal:         General: Normal range of motion.     Neurological: She is alert and oriented to person, place, and time.   Skin: Skin is warm and dry. Capillary refill takes less than 2 seconds. No rash noted. No erythema.    Psychiatric: She has a normal mood and affect.         ED Course   Procedures  Labs Reviewed - No data to display       Imaging Results    None          Medications   alteplase injection 2 mg (2 mg Other Not Given 6/15/22 1615)   heparin, porcine (PF) 100 unit/mL injection flush 200 Units (200 Units Intravenous Given 6/15/22 1640)   heparin, porcine (PF) 100 unit/mL injection flush 210 Units (210 Units Intravenous Given 6/15/22 1640)     Medical Decision Making:   Initial Assessment:   This is an emergent evaluation of a 77 y.o.female patient with presentation of clotted dialysis cath.  Patient has no other complaints.  TPA ordered.  Dialysis nurse to come down and declot.             ED Course as of 06/15/22 1644   Wed Theodore 15, 2022   1644 Dialysis nurse able to declot the catheter without use of tPA.  Heparin was instilled by RN.  Stable for discharge [NP]      ED Course User Index  [NP] Hood Rdz MD             Clinical Impression:   Final diagnoses:  [T82.49XA] Other complication of vascular dialysis catheter, initial encounter (Primary)          ED Disposition Condition    Discharge Stable        ED Prescriptions     None        Follow-up Information     Follow up With Specialties Details Why Contact Info    [facility] Manjit Verduzco MD Family Medicine Schedule an appointment as soon as possible for a visit   [address]  [address]  [facility] [id number] [phone]             Hood Rdz MD  06/15/22 1644

## 2022-06-15 NOTE — ED NOTES
Both arterial and venous access of dialysis access able to pull back 3ml of blood and flushed with 10ml of normal saline. Both sides locked with appropriate amount of heparin flush. Arterial 2.0 and Venous 2.1ml Patient tolerated well with no complaints or issues

## 2022-06-15 NOTE — ED NOTES
APPEARANCE: Awake, alert, & oriented. No acute distress.  CARDIAC: Normal rate and rhythm. Denies chest pain.     RESPIRATORY: Respirations are even and labored and shallow on 2 liters nasal cannula oxygen  no obvious signs of distress per patient who reports this is her baseline. No accessory muscle use. Breath sounds diminished bilaterally throughout chest.  PERIPHERAL VASCULAR: peripheral pulses present. Normal cap refill. No edema.   GASTRO: soft, no tenderness, no abdominal distention.  MUSC: Full ROM. No bony tenderness or soft tissue tenderness. No obvious deformity.  SKIN: Skin is warm, dry, and intact. Normal skin turgor and color.  NEURO: Equal strength bilaterally. [place] coma scale: Eye Response-4, Motor Response-6, Verbal Response-5. Total=15. Clear speech. No neurological abnormalities.   EENT: No c/o vision or hearing difficulties. Oropharynx clear.  Jimenez Cath in right subclavian

## 2022-06-27 RX ORDER — HYDROCODONE BITARTRATE AND ACETAMINOPHEN 10; 325 MG/1; MG/1
1 TABLET ORAL 3 TIMES DAILY PRN
Qty: 90 TABLET | Refills: 0 | Status: SHIPPED | OUTPATIENT
Start: 2022-06-27 | End: 2022-07-26 | Stop reason: SDUPTHER

## 2022-07-07 ENCOUNTER — TELEPHONE (OUTPATIENT)
Dept: FAMILY MEDICINE | Facility: CLINIC | Age: 77
End: 2022-07-07
Payer: MEDICARE

## 2022-07-07 ENCOUNTER — PES CALL (OUTPATIENT)
Dept: ADMINISTRATIVE | Facility: CLINIC | Age: 77
End: 2022-07-07
Payer: MEDICARE

## 2022-07-07 NOTE — TELEPHONE ENCOUNTER
Called patient but no answer. Spoke with patient . Patient  stated he and his wife never requested an appointment. Patient  is lost on why was he was being asked about him and his wife needing an appointment.

## 2022-07-07 NOTE — TELEPHONE ENCOUNTER
----- Message from Mary Ellen Avila Patient Care Assistant sent at 7/7/2022  4:49 PM CDT -----  Type:  Sooner Apoointment Request    Caller is requesting a sooner appointment.  Caller declined first available appointment listed below.  Caller will not accept being placed on the waitlist and is requesting a message be sent to doctor.  Name of Caller: pt  When is the first available appointment? 08/30  Symptoms: severe left wrist pain  Would the patient rather a call back or a response via [facility]?  Please call  Best Call Back Number: [phone]  Additional Information:  patient stated she only can coming in on Tuesday and Thursday due to dialysis

## 2022-07-22 ENCOUNTER — ANESTHESIA (OUTPATIENT)
Dept: SURGERY | Facility: HOSPITAL | Age: 77
End: 2022-07-22
Payer: MEDICARE

## 2022-07-22 ENCOUNTER — ANESTHESIA EVENT (OUTPATIENT)
Dept: SURGERY | Facility: HOSPITAL | Age: 77
End: 2022-07-22
Payer: MEDICARE

## 2022-07-22 ENCOUNTER — HOSPITAL ENCOUNTER (OUTPATIENT)
Facility: HOSPITAL | Age: 77
Discharge: HOME | End: 2022-07-22
Attending: SURGERY | Admitting: SURGERY
Payer: MEDICARE

## 2022-07-22 VITALS
WEIGHT: 95 LBS | DIASTOLIC BLOOD PRESSURE: 59 MMHG | HEIGHT: 62 IN | SYSTOLIC BLOOD PRESSURE: 112 MMHG | BODY MASS INDEX: 17.48 KG/M2 | HEART RATE: 80 BPM | OXYGEN SATURATION: 96 % | RESPIRATION RATE: 16 BRPM | TEMPERATURE: 98 F

## 2022-07-22 DIAGNOSIS — Z99.2 ESRD (END STAGE RENAL DISEASE) ON DIALYSIS: Chronic | ICD-10-CM

## 2022-07-22 DIAGNOSIS — T82.49XD CLOTTED DIALYSIS ACCESS, SUBSEQUENT ENCOUNTER: Primary | ICD-10-CM

## 2022-07-22 DIAGNOSIS — N18.6 ESRD (END STAGE RENAL DISEASE) ON DIALYSIS: Chronic | ICD-10-CM

## 2022-07-22 DIAGNOSIS — I10 ESSENTIAL HYPERTENSION: Chronic | ICD-10-CM

## 2022-07-22 LAB
ANION GAP SERPL CALC-SCNC: 13 MMOL/L (ref 8–16)
BUN SERPL-MCNC: 35 MG/DL (ref 8–23)
CALCIUM SERPL-MCNC: 9.5 MG/DL (ref 8.7–10.5)
CHLORIDE SERPL-SCNC: 103 MMOL/L (ref 95–110)
CO2 SERPL-SCNC: 22 MMOL/L (ref 23–29)
CREAT SERPL-MCNC: 2.3 MG/DL (ref 0.5–1.4)
EST. GFR  (AFRICAN AMERICAN): 23 ML/MIN/1.73 M^2
EST. GFR  (NON AFRICAN AMERICAN): 20 ML/MIN/1.73 M^2
GLUCOSE SERPL-MCNC: 71 MG/DL (ref 70–110)
POTASSIUM SERPL-SCNC: 4 MMOL/L (ref 3.5–5.1)
SODIUM SERPL-SCNC: 138 MMOL/L (ref 136–145)

## 2022-07-22 PROCEDURE — 80048 BASIC METABOLIC PNL TOTAL CA: CPT | Performed by: SURGERY

## 2022-07-22 PROCEDURE — 36415 COLL VENOUS BLD VENIPUNCTURE: CPT | Performed by: SURGERY

## 2022-07-22 PROCEDURE — 25000003 PHARM REV CODE 250: Performed by: NURSE ANESTHETIST, CERTIFIED REGISTERED

## 2022-07-22 PROCEDURE — 63600175 PHARM REV CODE 636 W HCPCS: Performed by: NURSE ANESTHETIST, CERTIFIED REGISTERED

## 2022-07-22 PROCEDURE — 37000009 HC ANESTHESIA EA ADD 15 MINS: Performed by: SURGERY

## 2022-07-22 PROCEDURE — 71000015 HC POSTOP RECOV 1ST HR: Performed by: SURGERY

## 2022-07-22 PROCEDURE — 63600175 PHARM REV CODE 636 W HCPCS: Performed by: SURGERY

## 2022-07-22 PROCEDURE — 36000706: Performed by: SURGERY

## 2022-07-22 PROCEDURE — 37000008 HC ANESTHESIA 1ST 15 MINUTES: Performed by: SURGERY

## 2022-07-22 PROCEDURE — 63600175 PHARM REV CODE 636 W HCPCS: Performed by: STUDENT IN AN ORGANIZED HEALTH CARE EDUCATION/TRAINING PROGRAM

## 2022-07-22 PROCEDURE — 36000707: Performed by: SURGERY

## 2022-07-22 PROCEDURE — 25000003 PHARM REV CODE 250: Performed by: STUDENT IN AN ORGANIZED HEALTH CARE EDUCATION/TRAINING PROGRAM

## 2022-07-22 PROCEDURE — 25000003 PHARM REV CODE 250: Performed by: SURGERY

## 2022-07-22 RX ORDER — HEPARIN SODIUM 1000 [USP'U]/ML
INJECTION, SOLUTION INTRAVENOUS; SUBCUTANEOUS
Status: DISCONTINUED | OUTPATIENT
Start: 2022-07-22 | End: 2022-07-22 | Stop reason: HOSPADM

## 2022-07-22 RX ORDER — MUPIROCIN 20 MG/G
OINTMENT TOPICAL
Status: DISCONTINUED | OUTPATIENT
Start: 2022-07-22 | End: 2022-07-22 | Stop reason: HOSPADM

## 2022-07-22 RX ORDER — CLINDAMYCIN PHOSPHATE 900 MG/50ML
INJECTION, SOLUTION INTRAVENOUS
Status: DISCONTINUED | OUTPATIENT
Start: 2022-07-22 | End: 2022-07-22

## 2022-07-22 RX ORDER — PHENYLEPHRINE HYDROCHLORIDE 10 MG/ML
INJECTION INTRAVENOUS
Status: DISCONTINUED | OUTPATIENT
Start: 2022-07-22 | End: 2022-07-22

## 2022-07-22 RX ORDER — PROPOFOL 10 MG/ML
VIAL (ML) INTRAVENOUS
Status: DISCONTINUED | OUTPATIENT
Start: 2022-07-22 | End: 2022-07-22

## 2022-07-22 RX ORDER — LIDOCAINE HYDROCHLORIDE AND EPINEPHRINE 15; 5 MG/ML; UG/ML
INJECTION, SOLUTION EPIDURAL
Status: DISCONTINUED | OUTPATIENT
Start: 2022-07-22 | End: 2022-07-22

## 2022-07-22 RX ORDER — PROPOFOL 10 MG/ML
VIAL (ML) INTRAVENOUS CONTINUOUS PRN
Status: DISCONTINUED | OUTPATIENT
Start: 2022-07-22 | End: 2022-07-22

## 2022-07-22 RX ORDER — HYDROCODONE BITARTRATE AND ACETAMINOPHEN 5; 325 MG/1; MG/1
1 TABLET ORAL EVERY 4 HOURS PRN
Status: DISCONTINUED | OUTPATIENT
Start: 2022-07-22 | End: 2022-07-22 | Stop reason: HOSPADM

## 2022-07-22 RX ORDER — VANCOMYCIN HYDROCHLORIDE 1 G/20ML
INJECTION, POWDER, LYOPHILIZED, FOR SOLUTION INTRAVENOUS
Status: DISCONTINUED | OUTPATIENT
Start: 2022-07-22 | End: 2022-07-22 | Stop reason: HOSPADM

## 2022-07-22 RX ORDER — DEXAMETHASONE SODIUM PHOSPHATE 4 MG/ML
INJECTION, SOLUTION INTRA-ARTICULAR; INTRALESIONAL; INTRAMUSCULAR; INTRAVENOUS; SOFT TISSUE
Status: DISCONTINUED | OUTPATIENT
Start: 2022-07-22 | End: 2022-07-22

## 2022-07-22 RX ORDER — ACETAMINOPHEN 325 MG/1
650 TABLET ORAL EVERY 4 HOURS PRN
Status: DISCONTINUED | OUTPATIENT
Start: 2022-07-22 | End: 2022-07-22 | Stop reason: HOSPADM

## 2022-07-22 RX ORDER — ONDANSETRON 2 MG/ML
INJECTION INTRAMUSCULAR; INTRAVENOUS
Status: DISCONTINUED | OUTPATIENT
Start: 2022-07-22 | End: 2022-07-22

## 2022-07-22 RX ORDER — SODIUM CHLORIDE 9 MG/ML
INJECTION, SOLUTION INTRAVENOUS CONTINUOUS
Status: CANCELLED | OUTPATIENT
Start: 2022-07-22

## 2022-07-22 RX ORDER — LIDOCAINE HYDROCHLORIDE 20 MG/ML
INJECTION INTRAVENOUS
Status: DISCONTINUED | OUTPATIENT
Start: 2022-07-22 | End: 2022-07-22

## 2022-07-22 RX ORDER — LIDOCAINE HYDROCHLORIDE 10 MG/ML
INJECTION, SOLUTION EPIDURAL; INFILTRATION; INTRACAUDAL; PERINEURAL
Status: DISCONTINUED | OUTPATIENT
Start: 2022-07-22 | End: 2022-07-22 | Stop reason: HOSPADM

## 2022-07-22 RX ADMIN — PHENYLEPHRINE HYDROCHLORIDE 100 MCG: 10 INJECTION INTRAVENOUS at 09:07

## 2022-07-22 RX ADMIN — PROPOFOL 25 MG: 10 INJECTION, EMULSION INTRAVENOUS at 09:07

## 2022-07-22 RX ADMIN — CLINDAMYCIN IN 5 PERCENT DEXTROSE 600 MG: 18 INJECTION, SOLUTION INTRAVENOUS at 09:07

## 2022-07-22 RX ADMIN — LIDOCAINE HYDROCHLORIDE,EPINEPHRINE BITARTRATE 20 ML: 15; .005 INJECTION, SOLUTION EPIDURAL; INFILTRATION; INTRACAUDAL; PERINEURAL at 09:07

## 2022-07-22 RX ADMIN — DEXAMETHASONE SODIUM PHOSPHATE 4 MG: 4 INJECTION, SOLUTION INTRA-ARTICULAR; INTRALESIONAL; INTRAMUSCULAR; INTRAVENOUS; SOFT TISSUE at 09:07

## 2022-07-22 RX ADMIN — PROPOFOL 50 MCG/KG/MIN: 10 INJECTION, EMULSION INTRAVENOUS at 09:07

## 2022-07-22 RX ADMIN — ONDANSETRON 4 MG: 2 INJECTION, SOLUTION INTRAMUSCULAR; INTRAVENOUS at 10:07

## 2022-07-22 RX ADMIN — SODIUM CHLORIDE: 0.9 INJECTION, SOLUTION INTRAVENOUS at 09:07

## 2022-07-22 RX ADMIN — PROPOFOL 10 MG: 10 INJECTION, EMULSION INTRAVENOUS at 09:07

## 2022-07-22 RX ADMIN — LIDOCAINE HYDROCHLORIDE 75 MG: 20 INJECTION, SOLUTION INTRAVENOUS at 09:07

## 2022-07-22 NOTE — PLAN OF CARE
Criteria met for discharge, IV removed, instructions explained,copy given. Denies pain, tolerating po well. Pt and family all verbalize understanding of instructions, have no further questions.Discharged home with family in good condition.

## 2022-07-22 NOTE — TRANSFER OF CARE
"Anesthesia Transfer of Care Note    Patient: Katie Quevedo    Procedure(s) Performed: Procedure(s) (LRB):  INSERTION, GRAFT, ARTERIOVENOUS (Right)    Patient location: [facility]    Anesthesia Type: regional    Transport from OR: Transported from OR on 2-3 L/min O2 by NC with adequate spontaneous ventilation    Post pain: adequate analgesia    Post assessment: no apparent anesthetic complications and tolerated procedure well    Post vital signs: stable    Level of consciousness: awake and alert    Nausea/Vomiting: no nausea/vomiting    Complications: none    Transfer of care protocol was followed      Last vitals:   Visit Vitals  /62 (BP Location: Right arm, Patient Position: Sitting)   Pulse 74   Temp 36.6 °C (97.9 °F) (Temporal)   Resp 16   Ht 5' 2" (1.575 m)   Wt 43.1 kg (95 lb)   LMP  (LMP Unknown)   SpO2 95%   Breastfeeding No   BMI 17.38 kg/m²     "

## 2022-07-22 NOTE — ANESTHESIA POSTPROCEDURE EVALUATION
Anesthesia Post Evaluation    Patient: Katie Quevedo    Procedure(s) Performed: Procedure(s) (LRB):  INSERTION, GRAFT, ARTERIOVENOUS (Right)    Final Anesthesia Type: regional      Patient location during evaluation: [facility]  Patient participation: Yes- Able to Participate  Level of consciousness: awake and alert and awake  Post-procedure vital signs: reviewed and stable  Pain management: adequate  Airway patency: patent    PONV status at discharge: No PONV  Anesthetic complications: no      Cardiovascular status: blood pressure returned to baseline, hemodynamically stable and stable  Respiratory status: unassisted, spontaneous ventilation and nasal cannula  Hydration status: euvolemic  Follow-up not needed.          Vitals Value Taken Time   See nursing notes for vitals     No case tracking events are documented in the log.      Pain/Nelson Score: No data recorded

## 2022-07-22 NOTE — H&P
History & Physical    SUBJECTIVE:     History of Present Illness:  Patient is a 77 y.o. female presents with      No chief complaint on file.      Review of patient's allergies indicates:   Allergen Reactions    Aspirin      Other reaction(s): hx of ulcers    Tetracycline Swelling     Other reaction(s): Swelling    Penicillins Rash     Other reaction(s): Hives  Other reaction(s): Rash  Other reaction(s): Rash  Other reaction(s): Hives       Current Facility-Administered Medications   Medication Dose Route Frequency Provider Last Rate Last Admin    heparin (porcine) injection    PRN Lindsey Louie MD   5,000 Units at 07/22/22 0859    LIDOcaine (PF) 10 mg/ml (1%) injection    PRN Lindsey Louie MD   20 mL at 07/22/22 0857    mupirocin 2 % ointment   Nasal On Call Procedure Lindsey Louie MD        vancomycin injection    PRN Lindsey Louie MD   1 g at 07/22/22 0858       Past Medical History:   Diagnosis Date    Acute congestive heart failure 02/10/2020    Anemia     Bilateral renal cysts     Cataract     Chronic LBP 7/26/2012    Chronic pain     CKD (chronic kidney disease), stage IV     Colon polyp 2013    COPD (chronic obstructive pulmonary disease)     Dehydration     Encounter for blood transfusion     HTN (hypertension)     Lumbar spondylosis     Melanoma     of the lip    Metabolic bone disease     Migraines, neuralgic     Osteoporosis     Primary osteoarthritis of both knees     s/p Rt TKA    Pulmonary embolism with infarction     Seizures 1972    x1 only    Subdeltoid bursitis, L>R. 9/27/2012    Ulcer     Vitamin D deficiency disease      Past Surgical History:   Procedure Laterality Date    BLADDER SUSPENSION      CATARACT EXTRACTION  11/18/13    left eye    CERVICAL LAMINECTOMY      x3, fusion x1    COLONOSCOPY  2009    DECLOTTING OF ARTERIOVENOUS GRAFT Left 1/3/2022    Procedure: [id number];  Surgeon: Lindsey Louie MD;  Location:  [facility] OR;  Service: Vascular;  Laterality: Left;    DECLOTTING OF ARTERIOVENOUS GRAFT Left 2/8/2022    Procedure: [id number];  Surgeon: Lindsey Louie MD;  Location: [facility] OR;  Service: Vascular;  Laterality: Left;    DECLOTTING OF ARTERIOVENOUS GRAFT Left 4/8/2022    Procedure: [id number];  Surgeon: Lindsey Louie MD;  Location: [facility] OR;  Service: Vascular;  Laterality: Left;    FISTULOGRAM N/A 2/27/2020    Procedure: Fistulogram;  Surgeon: Noe Benitez MD;  Location: [facility] CATH LAB/EP;  Service: Cardiology;  Laterality: N/A;    HYSTERECTOMY      JOINT REPLACEMENT  2001    total right knee     LUMBAR LAMINECTOMY      x 3, fusion x1    OOPHORECTOMY      PERIPHERAL ANGIOGRAPHY N/A 3/9/2020    Procedure: Peripheral angiography;  Surgeon: Jacinto Henriquez MD;  Location: [facility] CATH LAB/EP;  Service: Cardiology;  Laterality: N/A;    PLACEMENT OF ARTERIOVENOUS GRAFT Left 1/21/2020    Procedure: INSERTION, GRAFT, ARTERIOVENOUS;  Surgeon: Lindsey Louie MD;  Location: [facility] OR;  Service: General;  Laterality: Left;    PLACEMENT OF DUAL-LUMEN VASCULAR CATHETER Right 4/16/2022    Procedure: INSERTION-CATHETER-RICKI;  Surgeon: Lindsey Louie MD;  Location: [facility] OR;  Service: General;  Laterality: Right;    THROMBECTOMY Left 2/3/2020    Procedure: THROMBECTOMY;  Surgeon: Lindsey Louie MD;  Location: [facility] OR;  Service: General;  Laterality: Left;    THROMBECTOMY  3/9/2020    Procedure: Thrombectomy;  Surgeon: Jacinto Henriquez MD;  Location: [facility] CATH LAB/EP;  Service: Cardiology;;    THROMBECTOMY Left 4/7/2022    Procedure: THROMBECTOMY;  Surgeon: Lindsey Louie MD;  Location: [facility] OR;  Service: General;  Laterality: Left;     Family History   Problem Relation Age of Onset    Arthritis Mother     Stroke Mother     Hypertension Father     Cancer Father     Cataracts Father     Diabetes Maternal Aunt     Hypertension Maternal Grandfather     Heart  "disease Maternal Grandfather     Heart attack Maternal Grandfather     Cataracts Sister     Glaucoma Cousin      Social History     Tobacco Use    Smoking status: Former Smoker     Types: Cigarettes    Smokeless tobacco: Former User     Quit date: 2/3/2015   Substance Use Topics    Alcohol use: Not Currently     Comment: Rare    Drug use: No        Review of Systems:    Review of Systems   Constitutional: Negative.    HENT: Negative.    Eyes: Negative.    Respiratory: Negative.    Cardiovascular: Negative.    Gastrointestinal: Negative.    Genitourinary: Negative.    Musculoskeletal: Negative.    Skin: Negative.    Neurological: Negative.    Psychiatric/Behavioral: Negative.        OBJECTIVE:     Vital Signs (Most Recent)  Temp: 97.9 °F (36.6 °C) (07/22/22 0745)  Pulse: 74 (07/22/22 0745)  Resp: 16 (07/22/22 0745)  BP: 132/62 (07/22/22 0745)  SpO2: 95 % (07/22/22 0745)  5' 2" (1.575 m)  43.1 kg (95 lb)     Physical Exam:    Physical Exam  Constitutional:       Appearance: She is well-developed.   HENT:      Head: Normocephalic.   Eyes:      Conjunctiva/sclera: Conjunctivae normal.      Pupils: Pupils are equal, round, and reactive to light.   Cardiovascular:      Rate and Rhythm: Normal rate and regular rhythm.      Heart sounds: Normal heart sounds.   Pulmonary:      Effort: Pulmonary effort is normal.      Breath sounds: Normal breath sounds.   Abdominal:      General: Bowel sounds are normal.      Palpations: Abdomen is soft.   Musculoskeletal:         General: Normal range of motion.      Cervical back: Normal range of motion and neck supple.   Skin:     General: Skin is warm and dry.   Neurological:      Mental Status: She is alert and oriented to person, place, and time.      Deep Tendon Reflexes: Reflexes are normal and symmetric.         Laboratory      Diagnostic Results:      ASSESSMENT/PLAN:     Clotted graft left arm  crf 5  htn  copd    PLAN:Plan   New graft righ arm     "

## 2022-07-22 NOTE — OP NOTE
[facility] - [facility] (Hospital)  Operative Note      Date of Procedure: 7/22/2022     Procedure: Procedure(s) (LRB):  INSERTION, GRAFT, ARTERIOVENOUS (Right)   unsuccesful.  Surgeon(s) and Role:     * Lindsey Louie MD - Primary    Assisting Surgeon: None    Pre-Operative Diagnosis: ESRD (end stage renal disease) on dialysis [N18.6, Z99.2]  Essential hypertension [I10]  Clotted dialysis access, subsequent encounter [T82.49XD]    Post-Operative Diagnosis: Post-Op Diagnosis Codes:     * ESRD (end stage renal disease) on dialysis [N18.6, Z99.2]     * Essential hypertension [I10]     * Clotted dialysis access, subsequent encounter [T82.49XD]    Anesthesia: Regional    Operative Findings (including complications, if any): attempted insertion goretex graft right upper arm done done under mac anaesthesia . No intra op complication tolerated well sent to recover room in stable condition.    Description of Technical Procedures:  After satisfactory IV sedation patient is position time-out was called site was confirmed right upper arm forearm was prepped and draped in a sterile manner using ChloraPrep stockinette was applied area upper medial aspect of the arm was infiltrated using 1% xylocaine solution seen was made in the same area taken out clips tissue subcu was used clamped bovied for the down vein was isolated medially was found to be low at less than 2 mm I dissection was carried on laterally the artery was found to be also tumor under size and suitable for the then another incision was made anterior antecubital fossa to see if there was any cephalic vein available after infiltration using 1% xylocaine solution the cephalic vein was also found to be very small on suitable for add AV fistula placement was decided to abandon the procedure wound was irrigated normal saline solution closed using 3-0 Vicryl subcutaneous tissue skin was closed using running 4-0 nylon suture sterile was dressing was applied instrument counts  sponge count counts correct tolerated well no intraop complication sent to recovery room in stable condition.    Significant Surgical Tasks Conducted by the Assistant(s), if Applicable: na    Estimated Blood Loss (EBL): 20 cc         Implants: * No implants in log *    Specimens:   Specimen (24h ago, onward)            None                  Condition: Good    Disposition: PACU - hemodynamically stable.    Attestation: I performed the procedure.    Discharge Note    OUTCOME: Patient tolerated treatment/procedure well without complication and is now ready for discharge.    DISPOSITION: Home or Self Care    FINAL DIAGNOSIS:  Clotted dialysis access    FOLLOWUP: In clinic 7 days    DISCHARGE INSTRUCTIONS:    Discharge Procedure Orders   Diet general     Keep surgical extremity elevated     Lifting restrictions     Leave dressing on - Keep it clean, dry, and intact until clinic visit     Call MD for:  temperature >100.4     Call MD for:  persistent nausea and vomiting     Call MD for:  severe uncontrolled pain     Call MD for:  redness, tenderness, or signs of infection (pain, swelling, redness, odor or green/yellow discharge around incision site)

## 2022-07-22 NOTE — ANESTHESIA PREPROCEDURE EVALUATION
07/22/2022  Katie Quevedo is a 77 y.o., female presents for AV graft under RA.    Past Medical History:   Diagnosis Date    Acute congestive heart failure 02/10/2020    Anemia     Bilateral renal cysts     Cataract     Chronic LBP 7/26/2012    Chronic pain     CKD (chronic kidney disease), stage IV     Colon polyp 2013    COPD (chronic obstructive pulmonary disease)     Dehydration     Encounter for blood transfusion     HTN (hypertension)     Lumbar spondylosis     Melanoma     of the lip    Metabolic bone disease     Migraines, neuralgic     Osteoporosis     Primary osteoarthritis of both knees     s/p Rt TKA    Pulmonary embolism with infarction     Seizures 1972    x1 only    Subdeltoid bursitis, L>R. 9/27/2012    Ulcer     Vitamin D deficiency disease      Past Surgical History:   Procedure Laterality Date    BLADDER SUSPENSION      CATARACT EXTRACTION  11/18/13    left eye    CERVICAL LAMINECTOMY      x3, fusion x1    COLONOSCOPY  2009    DECLOTTING OF ARTERIOVENOUS GRAFT Left 1/3/2022    Procedure: [id number];  Surgeon: Lindsey Louie MD;  Location: [facility] OR;  Service: Vascular;  Laterality: Left;    DECLOTTING OF ARTERIOVENOUS GRAFT Left 2/8/2022    Procedure: [id number];  Surgeon: Lindsey Louie MD;  Location: [facility] OR;  Service: Vascular;  Laterality: Left;    DECLOTTING OF ARTERIOVENOUS GRAFT Left 4/8/2022    Procedure: [id number];  Surgeon: Lindsey Louie MD;  Location: [facility] OR;  Service: Vascular;  Laterality: Left;    FISTULOGRAM N/A 2/27/2020    Procedure: Fistulogram;  Surgeon: Noe Benitez MD;  Location: [facility] CATH LAB/EP;  Service: Cardiology;  Laterality: N/A;    HYSTERECTOMY      JOINT REPLACEMENT  2001    total right knee     LUMBAR LAMINECTOMY      x 3, fusion x1    OOPHORECTOMY      PERIPHERAL ANGIOGRAPHY  N/A 3/9/2020    Procedure: Peripheral angiography;  Surgeon: Jacinto Henriquez MD;  Location: [facility] CATH LAB/EP;  Service: Cardiology;  Laterality: N/A;    PLACEMENT OF ARTERIOVENOUS GRAFT Left 1/21/2020    Procedure: INSERTION, GRAFT, ARTERIOVENOUS;  Surgeon: Lindsey Louie MD;  Location: [facility] OR;  Service: General;  Laterality: Left;    PLACEMENT OF DUAL-LUMEN VASCULAR CATHETER Right 4/16/2022    Procedure: INSERTION-CATHETER-RICKI;  Surgeon: Lindsey Louie MD;  Location: [facility] OR;  Service: General;  Laterality: Right;    THROMBECTOMY Left 2/3/2020    Procedure: THROMBECTOMY;  Surgeon: Lindsey Louie MD;  Location: [facility] OR;  Service: General;  Laterality: Left;    THROMBECTOMY  3/9/2020    Procedure: Thrombectomy;  Surgeon: Jacinto Henriquez MD;  Location: [facility] CATH LAB/EP;  Service: Cardiology;;    THROMBECTOMY Left 4/7/2022    Procedure: THROMBECTOMY;  Surgeon: Lindsey Louie MD;  Location: [facility] OR;  Service: General;  Laterality: Left;           Pre-op Assessment    I have reviewed the Patient Summary Reports.     I have reviewed the Nursing Notes. I have reviewed the NPO Status.   I have reviewed the Medications.     Review of Systems  Anesthesia Hx:  No problems with previous Anesthesia  Denies Family Hx of Anesthesia complications.   Denies Personal Hx of Anesthesia complications.   Social:  Former Smoker, No Alcohol Use    Hematology/Oncology:         -- Anemia:   EENT/Dental:EENT/Dental Normal   Cardiovascular:   Exercise tolerance: poor Hypertension, well controlled CHF    Pulmonary:   COPD, severe Shortness of breath    Renal/:   Chronic Renal Disease, ESRD, Dialysis    Hepatic/GI:  Hepatic/GI Normal    Musculoskeletal:   Arthritis     Neurological:   Seizures    Endocrine:  Endocrine Normal        Physical Exam  General: Well nourished, Cooperative, Alert and Oriented    Airway:  Mallampati: II   Mouth Opening: Normal  TM Distance: Normal  Tongue: Normal  Neck ROM: Normal  ROM    Chest/Lungs:  Clear to auscultation, Normal Respiratory Rate    Heart:  Rate: Normal  Rhythm: Regular Rhythm  Sounds: Normal        Anesthesia Plan  Type of Anesthesia, risks & benefits discussed:    Anesthesia Type: Regional, MAC, Gen ETT  Intra-op Monitoring Plan: Standard ASA Monitors  Post Op Pain Control Plan: multimodal analgesia  Induction:  IV  Airway Plan: Direct  Informed Consent: Informed consent signed with the Patient and all parties understand the risks and agree with anesthesia plan.  All questions answered.   ASA Score: 3    Ready For Surgery From Anesthesia Perspective.     .

## 2022-07-22 NOTE — PLAN OF CARE
Pt has an AV graft in left upper arm no thrill noted.  Surgery is to place a graft in right arm.  Dr. Mendoza notified and asked for guidance for IV placement, will send an anesthesia rep to assess patient.

## 2022-07-22 NOTE — DISCHARGE INSTRUCTIONS
ANESTHESIA  -For the first 24 hours after surgery:  Do not drive, use heavy equipment, make important decisions, or drink alcohol  -It is normal to feel sleepy for several hours.  Rest until you are more awake.  -Have someone stay with you, if needed.  They can watch for problems and help keep you safe.  -Some possible post anesthesia side effects include: nausea and vomiting, sore throat and hoarseness, sleepiness, and dizziness.    PAIN  -If you have pain after surgery, pain medicine will help you feel better.  Take it as directed, before pain becomes severe.  Most pain relievers taken by mouth need at least 20-30 minutes to start working.  -Do not drive or drink alcohol while taking pain medicine.  -Pain medication can upset your stomach.  Taking them with a little food may help.  -Other ways to help control pain: elevation, ice, and relaxation  -Call your surgeon if still having unmanageable pain an hour after taking pain medicine.  -Pain medicine can cause constipation.  Taking an over-the counter stool softener while on prescription pain medicine and drinking plenty of fluids can prevent this side effect.  -Call your surgeon if you have severe side effects like: breathing problems, trouble waking up, dizziness, confusion, or severe constipation.    NAUSEA  -Some people have nausea after surgery.  This is often because of anesthesia, pain, pain medicine, or the stress of surgery.  -Do not push yourself to eat.  Start off with clear liquids and soup.  Slowly move to solid foods.  Don't eat fatty, rich, spicy foods at first.  Eat smaller amounts.  -If you develop persistent nausea and vomiting please notify your surgeon immediately.    BLEEDING  -Different types of surgery require different types of care and dressing changes.  It is important to follow all instructions and advice from your surgeon.  Change dressing as directed.  Call your surgeon for any concerns regarding postop bleeding.    SIGNS OF  INFECTION  -Signs of infection include: fever, swelling, drainage, and redness  -Notify your surgeon if you have a fever of 100.4 F (38.0 C) or higher.  -Notify your surgeon if you notice redness, swelling, increased pain, pus, or a foul smell at the incision site.    Follow up in dialysis as scheduled today

## 2022-07-22 NOTE — ANESTHESIA PROCEDURE NOTES
Peripheral Block    Patient location during procedure: OR    Reason for block: primary anesthetic    Diagnosis: AV firstula   Start time: 7/22/2022 9:17 AM  Timeout: 7/22/2022 9:15 AM   End time: 7/22/2022 9:20 AM    Staffing  Authorizing Provider: Tin Cid MD  Performing Provider: Tal Valiente MD    Preanesthetic Checklist  Completed: patient identified, IV checked, site marked, risks and benefits discussed, surgical consent, monitors and equipment checked, pre-op evaluation and timeout performed  Peripheral Block  Patient position: supine  Prep: ChloraPrep  Patient monitoring: heart rate, cardiac monitor, continuous pulse ox and frequent blood pressure checks  Block type: supraclavicular  Laterality: right  Injection technique: single shot  Needle  Needle type: Stimuplex   Needle gauge: 22 G  Needle length: 1.5 in  Needle localization: anatomical landmarks and ultrasound guidance   -ultrasound image captured on disc.  Assessment  Injection assessment: negative aspiration, negative parasthesia and local visualized surrounding nerve  Paresthesia pain: none  Heart rate change: no  Slow fractionated injection: yes  Pain Tolerance: comfortable throughout block and no complaints      Additional Notes  VSS.  Patient tolerated procedure well.

## 2022-07-26 RX ORDER — HYDROCODONE BITARTRATE AND ACETAMINOPHEN 10; 325 MG/1; MG/1
1 TABLET ORAL 3 TIMES DAILY PRN
Qty: 90 TABLET | Refills: 0 | OUTPATIENT
Start: 2022-07-26 | End: 2022-08-25

## 2022-07-26 RX ORDER — HYDROCODONE BITARTRATE AND ACETAMINOPHEN 10; 325 MG/1; MG/1
1 TABLET ORAL 3 TIMES DAILY PRN
Qty: 90 TABLET | Refills: 0 | Status: SHIPPED | OUTPATIENT
Start: 2022-07-28 | End: 2022-08-29 | Stop reason: SDUPTHER

## 2022-07-26 NOTE — TELEPHONE ENCOUNTER
----- Message from Melecio Barahona sent at 7/26/2022 10:51 AM CDT -----  Pt is calling in to get a prescription refill for HYDROcodone-acetaminophen (NORCO)  mg per tablet. Please call to discuss further. Pt would also like to schedule and appt and it has to be on a Tuesday or Thursday.      [facility]  [address] [facility] [address]  [facility] [address]  Phone: [phone] Fax: [phone]

## 2022-07-26 NOTE — TELEPHONE ENCOUNTER
Last Rx refill-----06/27/22  Last office visit--10/28/21  Next office visit--So far there are no available appointments at this time.  The patient was added to the wait list.

## 2022-07-29 ENCOUNTER — TELEPHONE (OUTPATIENT)
Dept: FAMILY MEDICINE | Facility: CLINIC | Age: 77
End: 2022-07-29
Payer: MEDICARE

## 2022-07-29 NOTE — TELEPHONE ENCOUNTER
Returned patient's call. She stated that she was in a lot of pain from an injury to her wrist and wanted to be seen. I told her that, unfortunately, I don't have any openings today, but to be seen before the weekend, she could go to urgent care. She asked what they could do and I told her that she can get an evaluation and possibly an x-ray to determine the extent of the injury and receive treatment. She said it sounded good and that she would go.

## 2022-07-29 NOTE — TELEPHONE ENCOUNTER
----- Message from Leanne Russo sent at 7/29/2022  8:21 AM CDT -----  Contact: [phone]  Type:  Same Day Appointment Request    Caller is requesting a same day appointment.  Caller declined first available appointment listed below.    Name of Caller:Pt  When is the first available appointment?9/19  Symptoms: hurt left wrist  Best Call Back Number:[phone]

## 2022-08-05 ENCOUNTER — PES CALL (OUTPATIENT)
Dept: ADMINISTRATIVE | Facility: CLINIC | Age: 77
End: 2022-08-05
Payer: MEDICARE

## 2022-08-23 ENCOUNTER — TELEPHONE (OUTPATIENT)
Dept: PHYSICAL MEDICINE AND REHAB | Facility: CLINIC | Age: 77
End: 2022-08-23
Payer: MEDICARE

## 2022-08-23 NOTE — TELEPHONE ENCOUNTER
----- Message from Christelle Dover sent at 8/23/2022 10:33 AM CDT -----  Contact: @ [phone]  Pt is calling to make a follow up appointment no available dates please call and adv @ [phone] said she has been trying for 2 months and no one has called her back plus she need a refill on HYDROcodone-acetaminophen (NORCO)  mg per tablet

## 2022-08-23 NOTE — TELEPHONE ENCOUNTER
Called patient [facility] number, still no answer  So far there are no available appointments at this time.  The patient was added to the wait list.    Patient Rx for Hydrocodone is to soon to refill 07/28/22- 08/27/22

## 2022-08-29 RX ORDER — HYDROCODONE BITARTRATE AND ACETAMINOPHEN 10; 325 MG/1; MG/1
1 TABLET ORAL 3 TIMES DAILY PRN
Qty: 90 TABLET | Refills: 0 | Status: SHIPPED | OUTPATIENT
Start: 2022-08-29 | End: 2022-08-30 | Stop reason: SDUPTHER

## 2022-08-29 NOTE — TELEPHONE ENCOUNTER
----- Message from Christelle Luther sent at 8/29/2022 11:05 AM CDT -----  Regarding: HYDROcodone-acetaminophen (NORCO)  mg per tablet  Refill request. Pt also needs an appt         HYDROcodone-acetaminophen (NORCO)  mg per tablet            [facility]  [address]  [place] [address]  Phone: [phone] Fax: [phone]      Confirmed patient's contact info below:  Contact Name: Katie Quevedo  Phone Number: [phone]

## 2022-08-29 NOTE — TELEPHONE ENCOUNTER
Last Rx refill-----07/26/22  Last office visit--10/28/21  Next office visit--So far there are no available appointments at this time.  The patient was added to the wait list.

## 2022-08-30 ENCOUNTER — OFFICE VISIT (OUTPATIENT)
Dept: PHYSICAL MEDICINE AND REHAB | Facility: CLINIC | Age: 77
End: 2022-08-30
Payer: MEDICARE

## 2022-08-30 VITALS
HEIGHT: 62 IN | WEIGHT: 88.38 LBS | SYSTOLIC BLOOD PRESSURE: 92 MMHG | BODY MASS INDEX: 16.26 KG/M2 | DIASTOLIC BLOOD PRESSURE: 59 MMHG | HEART RATE: 119 BPM

## 2022-08-30 DIAGNOSIS — M25.512 CHRONIC LEFT SHOULDER PAIN: ICD-10-CM

## 2022-08-30 DIAGNOSIS — M17.0 PRIMARY OSTEOARTHRITIS OF BOTH KNEES: ICD-10-CM

## 2022-08-30 DIAGNOSIS — G89.29 CHRONIC LEFT SHOULDER PAIN: ICD-10-CM

## 2022-08-30 DIAGNOSIS — M96.1 CERVICAL POST-LAMINECTOMY SYNDROME: ICD-10-CM

## 2022-08-30 DIAGNOSIS — M16.0 PRIMARY OSTEOARTHRITIS OF BOTH HIPS: ICD-10-CM

## 2022-08-30 DIAGNOSIS — G89.29 CHRONIC NECK PAIN: ICD-10-CM

## 2022-08-30 DIAGNOSIS — M47.26 OSTEOARTHRITIS OF SPINE WITH RADICULOPATHY, LUMBAR REGION: ICD-10-CM

## 2022-08-30 DIAGNOSIS — M54.2 CHRONIC NECK PAIN: ICD-10-CM

## 2022-08-30 DIAGNOSIS — Z79.891 CHRONICALLY ON OPIATE THERAPY: ICD-10-CM

## 2022-08-30 DIAGNOSIS — G89.29 CHRONIC RIGHT SHOULDER PAIN: ICD-10-CM

## 2022-08-30 DIAGNOSIS — M25.532 CHRONIC PAIN OF LEFT WRIST: ICD-10-CM

## 2022-08-30 DIAGNOSIS — G89.29 CHRONIC PAIN OF LEFT WRIST: ICD-10-CM

## 2022-08-30 DIAGNOSIS — G89.29 CHRONIC MIDLINE LOW BACK PAIN WITH RIGHT-SIDED SCIATICA: Primary | ICD-10-CM

## 2022-08-30 DIAGNOSIS — M96.1 LUMBAR POSTLAMINECTOMY SYNDROME: ICD-10-CM

## 2022-08-30 DIAGNOSIS — M54.41 CHRONIC MIDLINE LOW BACK PAIN WITH RIGHT-SIDED SCIATICA: Primary | ICD-10-CM

## 2022-08-30 DIAGNOSIS — M25.511 CHRONIC RIGHT SHOULDER PAIN: ICD-10-CM

## 2022-08-30 PROCEDURE — 3074F PR MOST RECENT SYSTOLIC BLOOD PRESSURE < 130 MM HG: ICD-10-PCS | Mod: CPTII,S$GLB,, | Performed by: PHYSICAL MEDICINE & REHABILITATION

## 2022-08-30 PROCEDURE — 3078F DIAST BP <80 MM HG: CPT | Mod: CPTII,S$GLB,, | Performed by: PHYSICAL MEDICINE & REHABILITATION

## 2022-08-30 PROCEDURE — 3074F SYST BP LT 130 MM HG: CPT | Mod: CPTII,S$GLB,, | Performed by: PHYSICAL MEDICINE & REHABILITATION

## 2022-08-30 PROCEDURE — 99999 PR PBB SHADOW E&M-EST. PATIENT-LVL III: CPT | Mod: PBBFAC,,, | Performed by: PHYSICAL MEDICINE & REHABILITATION

## 2022-08-30 PROCEDURE — 1159F PR MEDICATION LIST DOCUMENTED IN MEDICAL RECORD: ICD-10-PCS | Mod: CPTII,S$GLB,, | Performed by: PHYSICAL MEDICINE & REHABILITATION

## 2022-08-30 PROCEDURE — 99214 PR OFFICE/OUTPT VISIT, EST, LEVL IV, 30-39 MIN: ICD-10-PCS | Mod: S$GLB,,, | Performed by: PHYSICAL MEDICINE & REHABILITATION

## 2022-08-30 PROCEDURE — 1159F MED LIST DOCD IN RCRD: CPT | Mod: CPTII,S$GLB,, | Performed by: PHYSICAL MEDICINE & REHABILITATION

## 2022-08-30 PROCEDURE — 99999 PR PBB SHADOW E&M-EST. PATIENT-LVL III: ICD-10-PCS | Mod: PBBFAC,,, | Performed by: PHYSICAL MEDICINE & REHABILITATION

## 2022-08-30 PROCEDURE — 1125F PR PAIN SEVERITY QUANTIFIED, PAIN PRESENT: ICD-10-PCS | Mod: CPTII,S$GLB,, | Performed by: PHYSICAL MEDICINE & REHABILITATION

## 2022-08-30 PROCEDURE — 1125F AMNT PAIN NOTED PAIN PRSNT: CPT | Mod: CPTII,S$GLB,, | Performed by: PHYSICAL MEDICINE & REHABILITATION

## 2022-08-30 PROCEDURE — 3078F PR MOST RECENT DIASTOLIC BLOOD PRESSURE < 80 MM HG: ICD-10-PCS | Mod: CPTII,S$GLB,, | Performed by: PHYSICAL MEDICINE & REHABILITATION

## 2022-08-30 PROCEDURE — 99214 OFFICE O/P EST MOD 30 MIN: CPT | Mod: S$GLB,,, | Performed by: PHYSICAL MEDICINE & REHABILITATION

## 2022-08-30 RX ORDER — HYDROCODONE BITARTRATE AND ACETAMINOPHEN 10; 325 MG/1; MG/1
1 TABLET ORAL 3 TIMES DAILY PRN
Qty: 90 TABLET | Refills: 0 | Status: SHIPPED | OUTPATIENT
Start: 2022-08-30 | End: 2022-09-26 | Stop reason: SDUPTHER

## 2022-08-30 RX ORDER — DICLOFENAC SODIUM 10 MG/G
2 GEL TOPICAL 3 TIMES DAILY PRN
COMMUNITY
Start: 2022-08-30 | End: 2022-11-04 | Stop reason: SDUPTHER

## 2022-08-30 RX ORDER — DULOXETIN HYDROCHLORIDE 30 MG/1
30 CAPSULE, DELAYED RELEASE ORAL DAILY
Qty: 30 CAPSULE | Refills: 3 | Status: SHIPPED | OUTPATIENT
Start: 2022-08-30 | End: 2023-08-18

## 2022-08-30 NOTE — PROGRESS NOTES
Subjective:       Patient ID: Katie Quevedo is a 77 y.o. female.    Chief Complaint: No chief complaint on file.    HISTORY OF PRESENT ILLNESS:  Mrs. Quevedo is a 77-year-old white female with past medical history of pulmonary hypertension, on home oxygen, chronic anticoagulation with Eliquis therapy and generalized osteoarthritis.  She is followed up in the Physical Medicine Clinic for chronic low back pain with lumbar radiculopathy, lumbar post-laminectomy syndrome, chronic neck pain with cervical radiculopathy, Cervical post-laminectomy syndrome and recurrent right subdeltoid bursitis.  Her last visit to the clinic was on 10/28/2021.  She was started on pregabalin and maintained on p.r.n. hydrocodone,  and p.r.n. Trazodone.  She was referred to Sports Medicine for shoulder pain.    The patient is coming today to the clinic for follow-up.  Her low back pain has been stable.  It is a constant aching pain in the lumbar spine.  She has occasional radiation to the right foot with numbness.  Her maximum pain is 10/10 and minimum 5-6/10.  Today, it is 8/10.  The patient complains of bilateral lower extremity weakness without change.  She denies any bowel or bladder incontinence.  She denies leg claudications, but cannot walk far due to her dyspnea.    Her neck pain has also been stable.  It is a constant aching and stiffness pain in the cervical spine.  It is usually localized.  It is worse with excess movement.  Her maximum pain is 10/10 and minimum 4/10.  Today, it is 7/10.  The patient has chronic right hand  weakness without change.  She has chronic right finger numbness. She denies impaired hand coordination such as problems buttoning or zipping.    Her bilateral knee pain has been under good control.    The patient started complaining of left wrist pain couple months ago.  She denies any preceding injuries.  Has been getting progressively worse.  It is a constant aching pain in the lateral aspect of the wrist.  It  is aggravated by twisting her hand.  Her max pain is 10/10 and minimum, at rest 0/10.  Today it is 0/10.    She is currently taking:  - hydrocodone/APAP 10/325 p.r.n., usually three times per day.  She has been out for couple days  - Tylenol 500 mg p.r.n. couple times per day for mild pain.  - Diclofenac gel topically about 3 times per day.  She was started on Lyrica 25 mg capsules twice per day last visit.  However, she had to stop it due to nightmares.  In the past, duloxetine was considered but not started due to her being on Celexa.  It has since been stopped.        Past Medical History:   Diagnosis Date    Acute congestive heart failure 02/10/2020    Anemia     Bilateral renal cysts     Cataract     Chronic LBP 7/26/2012    Chronic pain     CKD (chronic kidney disease), stage IV     Colon polyp 2013    COPD (chronic obstructive pulmonary disease)     Dehydration     Encounter for blood transfusion     HTN (hypertension)     Lumbar spondylosis     Melanoma     of the lip    Metabolic bone disease     Migraines, neuralgic     Osteoporosis     Primary osteoarthritis of both knees     s/p Rt TKA    Pulmonary embolism with infarction     Seizures 1972    x1 only    Subdeltoid bursitis, L>R. 9/27/2012    Ulcer     Vitamin D deficiency disease        Review of patient's allergies indicates:   Allergen Reactions    Aspirin      Other reaction(s): hx of ulcers    Tetracycline Swelling     Other reaction(s): Swelling    Penicillins Rash     Other reaction(s): Hives  Other reaction(s): Rash  Other reaction(s): Rash  Other reaction(s): Hives         Review of Systems   Constitutional:  Positive for fatigue. Negative for chills and fever.   Eyes:  Negative for visual disturbance.   Respiratory:  Positive for shortness of breath.    Cardiovascular:  Negative for chest pain.   Gastrointestinal:  Negative for constipation, nausea and vomiting.   Genitourinary:  Negative for difficulty urinating.   Musculoskeletal:  Positive  for back pain, gait problem and neck pain.   Neurological:  Negative for dizziness and headaches.   Psychiatric/Behavioral:  Negative for behavioral problems and sleep disturbance.      Objective:      Physical Exam  Vitals reviewed.   Constitutional:       Appearance: She is well-developed.      Comments: Coming to the clinic in an electric scooter.  On oxygen through nasal cannula.  Mildly dyspneic.   Neck:      Comments: Decreased ROM.  +ve mild tenderness.  Musculoskeletal:      Comments: BUE:  ROM: decreased at shoulder abduction, Rt worse than Lt.  +ve Heberden's & Nilsa's nodes.  Strength:    RUE: 3/5 at shoulder abduction, 4- elbow flexion, 4- elbow extension, 4- hand .   LUE: 3/5 at shoulder abduction, 4 elbow flexion, 4 elbow extension, 4 hand .  Sensation to pinprick:   RUE: mild decrease.   LUE: intact.    Left Wrist:  +ve severe tenderness radial aspect of wrist.  +ve Finkelstein's test      BLE:  ROM:full.  +ve bilateral knee crepitus.   Strength:    RLE: 3+/5 at hip flexion, 4 knee extension, 4- ankle DF, 4- PF.   LLE: 3+/5 at hip flexion, 4 knee extension, 4 ankle DF, 4 PF.  Sensation to pinprick:     RLE: mild decrease.      LLE: intact.   SLR (sitting):      RLE: +ve.      LLE: -ve.    +ve mild tenderness lumbar spine.   Neurological:      Mental Status: She is alert.           Assessment:       1. Chronic midline low back pain with right-sided sciatica    2. Osteoarthritis of spine with radiculopathy, lumbar region    3. Lumbar postlaminectomy syndrome    4. Chronic neck pain    5. Cervical post-laminectomy syndrome    6. Primary osteoarthritis of both knees    7. Primary osteoarthritis of both hips    8. Chronic right shoulder pain    9. Chronic left shoulder pain    10. Chronically on opiate therapy        Plan:     - Start DULoxetine (CYMBALTA) 30 MG capsule; Take 1 capsule (30 mg total) by mouth once daily.  - Continue diclofenac sodium (VOLTAREN) 1 % Gel; Apply 2 g topically 3  (three) times daily as needed (Apply to painful joints).  - Continue hydrocodone-acetaminophen (PERCOCET)  mg per tablet; Take 1 tablet by mouth 3 (three) times daily as needed for Pain.  - Start  diclofenac sodium (VOLTAREN) 1 % Gel; Apply 2 g topically 3 (three) times daily as needed   - Ambulatory referral/consult to Orthopedics for chronic pain of left wrist, likley due to DeQuervain's Tenosynovitis  - Follow up in about 4 months (around 12/30/2022).      This was a 30 minute visit, more than 50% of which was spent counseling the patient about the diagnosis and the treatment plan.    This note was partly generated with [email or web address] voice recognition software. I apologize for any possible typographical errors.

## 2022-09-26 RX ORDER — HYDROCODONE BITARTRATE AND ACETAMINOPHEN 10; 325 MG/1; MG/1
1 TABLET ORAL 3 TIMES DAILY PRN
Qty: 90 TABLET | Refills: 0 | Status: SHIPPED | OUTPATIENT
Start: 2022-09-30 | End: 2022-10-21 | Stop reason: SDUPTHER

## 2022-09-26 NOTE — TELEPHONE ENCOUNTER
----- Message from Sarah Campuzano MA sent at 9/26/2022  4:26 PM CDT -----  Regarding: med refill  Contact: Katie Brantley is requesting a refill on the following medication: HYDROcodone-acetaminophen (NORCO)  mg per tablet 90 tablet           [facility]  [address]  [place] [address]  Phone: [phone] Fax: [phone]

## 2022-09-30 DIAGNOSIS — J43.2 CENTRILOBULAR EMPHYSEMA: Chronic | ICD-10-CM

## 2022-09-30 RX ORDER — ALBUTEROL SULFATE 90 UG/1
2 AEROSOL, METERED RESPIRATORY (INHALATION) EVERY 6 HOURS PRN
Qty: 18 G | Refills: 3 | OUTPATIENT
Start: 2022-09-30

## 2022-09-30 NOTE — TELEPHONE ENCOUNTER
Care Due:                  Date            Visit Type   Department     Provider  --------------------------------------------------------------------------------                                EP -                              PRIMARY      [facility]  Last Visit: 03-      CARE ([facility])   MEDICINE       [place] [facility]du[facility]  Next Visit: None Scheduled  None         None Found                                                            Last  Test          Frequency    Reason                     Performed    Due Date  --------------------------------------------------------------------------------    Office Visit  12 months..  albuterol,                 03- 03-                             fluticasone-umeclidin-bobby                             anter, mirtazapine.......    Lipid Panel.  12 months..  mirtazapine..............  Not Found    Overdue    [employer] Embedded Care Gaps. Reference number: [id number]. 9/30/2022   10:51:12 AM CDT

## 2022-10-04 ENCOUNTER — OFFICE VISIT (OUTPATIENT)
Dept: CARDIOLOGY | Facility: CLINIC | Age: 77
End: 2022-10-04
Payer: MEDICARE

## 2022-10-04 VITALS
WEIGHT: 88.38 LBS | HEIGHT: 62 IN | DIASTOLIC BLOOD PRESSURE: 77 MMHG | HEART RATE: 76 BPM | BODY MASS INDEX: 16.26 KG/M2 | OXYGEN SATURATION: 94 % | SYSTOLIC BLOOD PRESSURE: 144 MMHG

## 2022-10-04 DIAGNOSIS — Z99.2 ESRD (END STAGE RENAL DISEASE) ON DIALYSIS: Chronic | ICD-10-CM

## 2022-10-04 DIAGNOSIS — R00.2 PALPITATIONS: Primary | ICD-10-CM

## 2022-10-04 DIAGNOSIS — J44.1 COPD EXACERBATION: ICD-10-CM

## 2022-10-04 DIAGNOSIS — I10 ESSENTIAL HYPERTENSION: Chronic | ICD-10-CM

## 2022-10-04 DIAGNOSIS — I50.32 CHRONIC DIASTOLIC HEART FAILURE: ICD-10-CM

## 2022-10-04 DIAGNOSIS — R06.02 SOB (SHORTNESS OF BREATH): ICD-10-CM

## 2022-10-04 DIAGNOSIS — N18.6 ESRD (END STAGE RENAL DISEASE) ON DIALYSIS: Chronic | ICD-10-CM

## 2022-10-04 DIAGNOSIS — I31.39 PERICARDIAL EFFUSION: ICD-10-CM

## 2022-10-04 PROCEDURE — 1101F PT FALLS ASSESS-DOCD LE1/YR: CPT | Mod: CPTII,S$GLB,, | Performed by: INTERNAL MEDICINE

## 2022-10-04 PROCEDURE — 1126F AMNT PAIN NOTED NONE PRSNT: CPT | Mod: CPTII,S$GLB,, | Performed by: INTERNAL MEDICINE

## 2022-10-04 PROCEDURE — 3288F FALL RISK ASSESSMENT DOCD: CPT | Mod: CPTII,S$GLB,, | Performed by: INTERNAL MEDICINE

## 2022-10-04 PROCEDURE — 99499 UNLISTED E&M SERVICE: CPT | Mod: S$GLB,,, | Performed by: INTERNAL MEDICINE

## 2022-10-04 PROCEDURE — 3078F PR MOST RECENT DIASTOLIC BLOOD PRESSURE < 80 MM HG: ICD-10-PCS | Mod: CPTII,S$GLB,, | Performed by: INTERNAL MEDICINE

## 2022-10-04 PROCEDURE — 99214 PR OFFICE/OUTPT VISIT, EST, LEVL IV, 30-39 MIN: ICD-10-PCS | Mod: S$GLB,,, | Performed by: INTERNAL MEDICINE

## 2022-10-04 PROCEDURE — 3077F SYST BP >= 140 MM HG: CPT | Mod: CPTII,S$GLB,, | Performed by: INTERNAL MEDICINE

## 2022-10-04 PROCEDURE — 3078F DIAST BP <80 MM HG: CPT | Mod: CPTII,S$GLB,, | Performed by: INTERNAL MEDICINE

## 2022-10-04 PROCEDURE — 3077F PR MOST RECENT SYSTOLIC BLOOD PRESSURE >= 140 MM HG: ICD-10-PCS | Mod: CPTII,S$GLB,, | Performed by: INTERNAL MEDICINE

## 2022-10-04 PROCEDURE — 99999 PR PBB SHADOW E&M-EST. PATIENT-LVL III: ICD-10-PCS | Mod: PBBFAC,,, | Performed by: INTERNAL MEDICINE

## 2022-10-04 PROCEDURE — 99999 PR PBB SHADOW E&M-EST. PATIENT-LVL III: CPT | Mod: PBBFAC,,, | Performed by: INTERNAL MEDICINE

## 2022-10-04 PROCEDURE — 1101F PR PT FALLS ASSESS DOC 0-1 FALLS W/OUT INJ PAST YR: ICD-10-PCS | Mod: CPTII,S$GLB,, | Performed by: INTERNAL MEDICINE

## 2022-10-04 PROCEDURE — 99214 OFFICE O/P EST MOD 30 MIN: CPT | Mod: S$GLB,,, | Performed by: INTERNAL MEDICINE

## 2022-10-04 PROCEDURE — 3288F PR FALLS RISK ASSESSMENT DOCUMENTED: ICD-10-PCS | Mod: CPTII,S$GLB,, | Performed by: INTERNAL MEDICINE

## 2022-10-04 PROCEDURE — 1126F PR PAIN SEVERITY QUANTIFIED, NO PAIN PRESENT: ICD-10-PCS | Mod: CPTII,S$GLB,, | Performed by: INTERNAL MEDICINE

## 2022-10-04 RX ORDER — ATORVASTATIN CALCIUM 80 MG/1
80 TABLET, FILM COATED ORAL DAILY
Status: ON HOLD | COMMUNITY
End: 2022-12-12 | Stop reason: SDUPTHER

## 2022-10-04 RX ORDER — LEVOFLOXACIN 750 MG/1
750 TABLET ORAL DAILY
Status: ON HOLD | COMMUNITY
End: 2022-10-19 | Stop reason: HOSPADM

## 2022-10-04 RX ORDER — CLONAZEPAM 1 MG/1
1 TABLET ORAL 2 TIMES DAILY PRN
Status: ON HOLD | COMMUNITY
End: 2022-10-20 | Stop reason: HOSPADM

## 2022-10-04 RX ORDER — MEGESTROL ACETATE 40 MG/1
40 TABLET ORAL DAILY
COMMUNITY
End: 2023-04-25

## 2022-10-04 RX ORDER — AMLODIPINE BESYLATE 5 MG/1
5 TABLET ORAL DAILY
Status: ON HOLD | COMMUNITY
End: 2022-12-12 | Stop reason: HOSPADM

## 2022-10-04 NOTE — PROGRESS NOTES
Subjective:    Patient ID:  Katie Quevedo is a 77 y.o. female who presents for evaluation of Palpitations and Shortness of Breath      HPI    76 y/o female who presents for evaluation. She has a hx of HTn, HFpEF, COPD, ESRD on HD. She has SOB/BERTRAND and palps. Denies CP, orthopnea, PND, syncope. Compliant with meds.     Review of Systems   Constitutional: Positive for malaise/fatigue.   HENT:  Negative for congestion.    Eyes:  Negative for blurred vision and double vision.   Cardiovascular:  Positive for dyspnea on exertion and palpitations. Negative for chest pain, claudication, cyanosis, irregular heartbeat, leg swelling, near-syncope, orthopnea, paroxysmal nocturnal dyspnea and syncope.   Respiratory:  Positive for shortness of breath.    Endocrine: Negative for polyuria.   Hematologic/Lymphatic: Negative for bleeding problem.   Skin:  Negative for itching and rash.   Musculoskeletal:  Negative for joint swelling, muscle cramps and muscle weakness.   Gastrointestinal:  Negative for abdominal pain, hematemesis, hematochezia, melena, nausea and vomiting.   Genitourinary:  Negative for dysuria and hematuria.   Neurological:  Negative for dizziness, focal weakness, headaches, light-headedness, loss of balance and weakness.   Psychiatric/Behavioral:  Negative for depression. The patient is not nervous/anxious.       Objective:    Physical Exam  Constitutional:       Appearance: She is well-developed.   HENT:      Head: Normocephalic and atraumatic.   Neck:      Vascular: No JVD.   Cardiovascular:      Rate and Rhythm: Normal rate and regular rhythm.      Pulses:           Carotid pulses are 2+ on the right side and 2+ on the left side.       Radial pulses are 2+ on the right side and 2+ on the left side.        Femoral pulses are 2+ on the right side and 2+ on the left side.     Heart sounds: Normal heart sounds.   Pulmonary:      Effort: Pulmonary effort is normal.      Breath sounds: Normal breath sounds.   Abdominal:       General: Bowel sounds are normal.      Palpations: Abdomen is soft.   Musculoskeletal:      Cervical back: Neck supple.   Skin:     General: Skin is warm and dry.   Neurological:      Mental Status: She is alert and oriented to person, place, and time.   Psychiatric:         Behavior: Behavior normal.         Thought Content: Thought content normal.         Assessment:       1. Palpitations    2. SOB (shortness of breath)    3. Chronic diastolic heart failure    4. Essential hypertension    5. Pericardial effusion    6. severe stage 4 COPD    7. ESRD (end stage renal disease) on dialysis      76 y/o pt with hx and presentation as above. Will evaluate symptoms with Holter, labs. Needs to stay active. Discussed the etiology, evaluation, and management of CHF, HTN, COPD, ESRD.m Discussed the importance of med compliance, heart healthy diet, and regular exercise.      Plan:       -Continue current medical management  -Holter  -labs  -BP log BID  -f/u 1 month

## 2022-10-11 ENCOUNTER — HOSPITAL ENCOUNTER (INPATIENT)
Facility: HOSPITAL | Age: 77
LOS: 9 days | Discharge: HOSPICE | DRG: 193 | End: 2022-10-20
Attending: EMERGENCY MEDICINE | Admitting: EMERGENCY MEDICINE
Payer: MEDICARE

## 2022-10-11 DIAGNOSIS — J96.20 ACUTE ON CHRONIC RESPIRATORY FAILURE: Primary | ICD-10-CM

## 2022-10-11 DIAGNOSIS — R93.3 ABNORMAL FINDING ON GI TRACT IMAGING: ICD-10-CM

## 2022-10-11 DIAGNOSIS — M54.16 LUMBAR RADICULOPATHY: ICD-10-CM

## 2022-10-11 DIAGNOSIS — R07.9 CHEST PAIN: ICD-10-CM

## 2022-10-11 DIAGNOSIS — R06.02 SOB (SHORTNESS OF BREATH): ICD-10-CM

## 2022-10-11 DIAGNOSIS — I95.9 HYPOTENSION, UNSPECIFIED HYPOTENSION TYPE: ICD-10-CM

## 2022-10-11 DIAGNOSIS — J11.1 INFLUENZA: ICD-10-CM

## 2022-10-11 DIAGNOSIS — K92.0 HEMATEMESIS OF UNKNOWN CAUSE: ICD-10-CM

## 2022-10-11 PROBLEM — J96.11 CHRONIC RESPIRATORY FAILURE WITH HYPOXIA, ON HOME O2 THERAPY: Chronic | Status: RESOLVED | Noted: 2019-07-18 | Resolved: 2022-10-11

## 2022-10-11 PROBLEM — J10.1 INFLUENZA A VIRUS PRESENT: Status: ACTIVE | Noted: 2022-10-11

## 2022-10-11 PROBLEM — Z99.81 CHRONIC RESPIRATORY FAILURE WITH HYPOXIA, ON HOME O2 THERAPY: Chronic | Status: RESOLVED | Noted: 2019-07-18 | Resolved: 2022-10-11

## 2022-10-11 LAB
ALBUMIN SERPL BCP-MCNC: 3.7 G/DL (ref 3.5–5.2)
ALP SERPL-CCNC: 284 U/L (ref 55–135)
ALT SERPL W/O P-5'-P-CCNC: 19 U/L (ref 10–44)
ANION GAP SERPL CALC-SCNC: 16 MMOL/L (ref 8–16)
AST SERPL-CCNC: 30 U/L (ref 10–40)
BASOPHILS # BLD AUTO: 0.08 K/UL (ref 0–0.2)
BASOPHILS NFR BLD: 1.6 % (ref 0–1.9)
BILIRUB SERPL-MCNC: 0.6 MG/DL (ref 0.1–1)
BUN SERPL-MCNC: 18 MG/DL (ref 8–23)
CALCIUM SERPL-MCNC: 9.8 MG/DL (ref 8.7–10.5)
CHLORIDE SERPL-SCNC: 100 MMOL/L (ref 95–110)
CO2 SERPL-SCNC: 20 MMOL/L (ref 23–29)
CREAT SERPL-MCNC: 2.3 MG/DL (ref 0.5–1.4)
CTP QC/QA: YES
DIFFERENTIAL METHOD: ABNORMAL
EOSINOPHIL # BLD AUTO: 0.2 K/UL (ref 0–0.5)
EOSINOPHIL NFR BLD: 3.2 % (ref 0–8)
ERYTHROCYTE [DISTWIDTH] IN BLOOD BY AUTOMATED COUNT: 13.3 % (ref 11.5–14.5)
EST. GFR  (NO RACE VARIABLE): 21 ML/MIN/1.73 M^2
GLUCOSE SERPL-MCNC: 108 MG/DL (ref 70–110)
HCT VFR BLD AUTO: 40.9 % (ref 37–48.5)
HGB BLD-MCNC: 12.5 G/DL (ref 12–16)
IMM GRANULOCYTES # BLD AUTO: 0.04 K/UL (ref 0–0.04)
IMM GRANULOCYTES NFR BLD AUTO: 0.8 % (ref 0–0.5)
INFLUENZA A, MOLECULAR: POSITIVE
INFLUENZA B, MOLECULAR: NEGATIVE
LACTATE SERPL-SCNC: 1.3 MMOL/L (ref 0.5–2.2)
LYMPHOCYTES # BLD AUTO: 0.4 K/UL (ref 1–4.8)
LYMPHOCYTES NFR BLD: 7.9 % (ref 18–48)
MCH RBC QN AUTO: 30.8 PG (ref 27–31)
MCHC RBC AUTO-ENTMCNC: 30.6 G/DL (ref 32–36)
MCV RBC AUTO: 101 FL (ref 82–98)
MONOCYTES # BLD AUTO: 0.9 K/UL (ref 0.3–1)
MONOCYTES NFR BLD: 17.4 % (ref 4–15)
NEUTROPHILS # BLD AUTO: 3.5 K/UL (ref 1.8–7.7)
NEUTROPHILS NFR BLD: 69.1 % (ref 38–73)
NRBC BLD-RTO: 0 /100 WBC
PLATELET # BLD AUTO: 171 K/UL (ref 150–450)
PMV BLD AUTO: 9.8 FL (ref 9.2–12.9)
POTASSIUM SERPL-SCNC: 3.8 MMOL/L (ref 3.5–5.1)
PROT SERPL-MCNC: 8.1 G/DL (ref 6–8.4)
RBC # BLD AUTO: 4.06 M/UL (ref 4–5.4)
SARS-COV-2 RDRP RESP QL NAA+PROBE: NEGATIVE
SODIUM SERPL-SCNC: 136 MMOL/L (ref 136–145)
SPECIMEN SOURCE: ABNORMAL
WBC # BLD AUTO: 5.07 K/UL (ref 3.9–12.7)

## 2022-10-11 PROCEDURE — 87502 INFLUENZA DNA AMP PROBE: CPT | Performed by: EMERGENCY MEDICINE

## 2022-10-11 PROCEDURE — 25000003 PHARM REV CODE 250: Performed by: EMERGENCY MEDICINE

## 2022-10-11 PROCEDURE — 83605 ASSAY OF LACTIC ACID: CPT | Performed by: INTERNAL MEDICINE

## 2022-10-11 PROCEDURE — 94761 N-INVAS EAR/PLS OXIMETRY MLT: CPT

## 2022-10-11 PROCEDURE — 25000003 PHARM REV CODE 250: Performed by: INTERNAL MEDICINE

## 2022-10-11 PROCEDURE — 93010 ELECTROCARDIOGRAM REPORT: CPT | Mod: ,,, | Performed by: INTERNAL MEDICINE

## 2022-10-11 PROCEDURE — 25000003 PHARM REV CODE 250: Performed by: NURSE PRACTITIONER

## 2022-10-11 PROCEDURE — 94640 AIRWAY INHALATION TREATMENT: CPT

## 2022-10-11 PROCEDURE — 36415 COLL VENOUS BLD VENIPUNCTURE: CPT | Performed by: INTERNAL MEDICINE

## 2022-10-11 PROCEDURE — 25000242 PHARM REV CODE 250 ALT 637 W/ HCPCS: Performed by: EMERGENCY MEDICINE

## 2022-10-11 PROCEDURE — 93005 ELECTROCARDIOGRAM TRACING: CPT

## 2022-10-11 PROCEDURE — 27000221 HC OXYGEN, UP TO 24 HOURS

## 2022-10-11 PROCEDURE — 11000001 HC ACUTE MED/SURG PRIVATE ROOM

## 2022-10-11 PROCEDURE — 93010 EKG 12-LEAD: ICD-10-PCS | Mod: ,,, | Performed by: INTERNAL MEDICINE

## 2022-10-11 PROCEDURE — 99285 EMERGENCY DEPT VISIT HI MDM: CPT | Mod: 25

## 2022-10-11 PROCEDURE — 63600175 PHARM REV CODE 636 W HCPCS: Performed by: INTERNAL MEDICINE

## 2022-10-11 PROCEDURE — 80053 COMPREHEN METABOLIC PANEL: CPT | Performed by: NURSE PRACTITIONER

## 2022-10-11 PROCEDURE — 85025 COMPLETE CBC W/AUTO DIFF WBC: CPT | Performed by: NURSE PRACTITIONER

## 2022-10-11 PROCEDURE — 87635 SARS-COV-2 COVID-19 AMP PRB: CPT | Performed by: NURSE PRACTITIONER

## 2022-10-11 RX ORDER — TRAMADOL HYDROCHLORIDE 50 MG/1
50 TABLET ORAL ONCE
Status: COMPLETED | OUTPATIENT
Start: 2022-10-11 | End: 2022-10-11

## 2022-10-11 RX ORDER — MIRTAZAPINE 7.5 MG/1
15 TABLET, FILM COATED ORAL NIGHTLY
Status: DISCONTINUED | OUTPATIENT
Start: 2022-10-11 | End: 2022-10-20 | Stop reason: HOSPADM

## 2022-10-11 RX ORDER — MEGESTROL ACETATE 40 MG/1
40 TABLET ORAL DAILY
Status: DISCONTINUED | OUTPATIENT
Start: 2022-10-12 | End: 2022-10-16

## 2022-10-11 RX ORDER — DIPHENHYDRAMINE HCL 25 MG
25 CAPSULE ORAL EVERY 6 HOURS PRN
Status: DISCONTINUED | OUTPATIENT
Start: 2022-10-11 | End: 2022-10-20 | Stop reason: HOSPADM

## 2022-10-11 RX ORDER — AMOXICILLIN 250 MG
1 CAPSULE ORAL 2 TIMES DAILY
Status: DISCONTINUED | OUTPATIENT
Start: 2022-10-11 | End: 2022-10-20 | Stop reason: HOSPADM

## 2022-10-11 RX ORDER — OXYCODONE HYDROCHLORIDE 5 MG/1
5 TABLET ORAL
Status: COMPLETED | OUTPATIENT
Start: 2022-10-11 | End: 2022-10-11

## 2022-10-11 RX ORDER — IBUPROFEN 200 MG
24 TABLET ORAL
Status: DISCONTINUED | OUTPATIENT
Start: 2022-10-11 | End: 2022-10-20 | Stop reason: HOSPADM

## 2022-10-11 RX ORDER — CLONAZEPAM 0.5 MG/1
1 TABLET ORAL 2 TIMES DAILY PRN
Status: DISCONTINUED | OUTPATIENT
Start: 2022-10-11 | End: 2022-10-14

## 2022-10-11 RX ORDER — IPRATROPIUM BROMIDE AND ALBUTEROL SULFATE 2.5; .5 MG/3ML; MG/3ML
3 SOLUTION RESPIRATORY (INHALATION)
Status: DISCONTINUED | OUTPATIENT
Start: 2022-10-11 | End: 2022-10-20 | Stop reason: HOSPADM

## 2022-10-11 RX ORDER — AMLODIPINE BESYLATE 5 MG/1
5 TABLET ORAL DAILY
Status: DISCONTINUED | OUTPATIENT
Start: 2022-10-12 | End: 2022-10-12

## 2022-10-11 RX ORDER — SIMETHICONE 80 MG
1 TABLET,CHEWABLE ORAL 4 TIMES DAILY PRN
Status: DISCONTINUED | OUTPATIENT
Start: 2022-10-11 | End: 2022-10-20 | Stop reason: HOSPADM

## 2022-10-11 RX ORDER — ATORVASTATIN CALCIUM 40 MG/1
80 TABLET, FILM COATED ORAL DAILY
Status: DISCONTINUED | OUTPATIENT
Start: 2022-10-12 | End: 2022-10-20 | Stop reason: HOSPADM

## 2022-10-11 RX ORDER — SEVELAMER CARBONATE 800 MG/1
800 TABLET, FILM COATED ORAL
Status: DISCONTINUED | OUTPATIENT
Start: 2022-10-12 | End: 2022-10-16

## 2022-10-11 RX ORDER — IBUPROFEN 200 MG
16 TABLET ORAL
Status: DISCONTINUED | OUTPATIENT
Start: 2022-10-11 | End: 2022-10-20 | Stop reason: HOSPADM

## 2022-10-11 RX ORDER — ACETAMINOPHEN AND CODEINE PHOSPHATE 120; 12 MG/5ML; MG/5ML
5 SOLUTION ORAL EVERY 8 HOURS PRN
Status: DISCONTINUED | OUTPATIENT
Start: 2022-10-11 | End: 2022-10-13

## 2022-10-11 RX ORDER — HEPARIN SODIUM 5000 [USP'U]/ML
5000 INJECTION, SOLUTION INTRAVENOUS; SUBCUTANEOUS EVERY 8 HOURS
Status: DISCONTINUED | OUTPATIENT
Start: 2022-10-11 | End: 2022-10-16

## 2022-10-11 RX ORDER — SODIUM CHLORIDE 0.9 % (FLUSH) 0.9 %
10 SYRINGE (ML) INJECTION EVERY 12 HOURS PRN
Status: DISCONTINUED | OUTPATIENT
Start: 2022-10-11 | End: 2022-10-20 | Stop reason: HOSPADM

## 2022-10-11 RX ORDER — NALOXONE HCL 0.4 MG/ML
0.02 VIAL (ML) INJECTION
Status: DISCONTINUED | OUTPATIENT
Start: 2022-10-11 | End: 2022-10-20 | Stop reason: HOSPADM

## 2022-10-11 RX ORDER — TALC
6 POWDER (GRAM) TOPICAL NIGHTLY PRN
Status: DISCONTINUED | OUTPATIENT
Start: 2022-10-11 | End: 2022-10-20 | Stop reason: HOSPADM

## 2022-10-11 RX ORDER — HYDROCODONE BITARTRATE AND ACETAMINOPHEN 10; 325 MG/1; MG/1
1 TABLET ORAL 3 TIMES DAILY PRN
Status: DISCONTINUED | OUTPATIENT
Start: 2022-10-11 | End: 2022-10-11

## 2022-10-11 RX ORDER — ACETAMINOPHEN 325 MG/1
650 TABLET ORAL
Status: COMPLETED | OUTPATIENT
Start: 2022-10-11 | End: 2022-10-11

## 2022-10-11 RX ORDER — ACETAMINOPHEN 325 MG/1
650 TABLET ORAL EVERY 8 HOURS PRN
Status: DISCONTINUED | OUTPATIENT
Start: 2022-10-11 | End: 2022-10-13

## 2022-10-11 RX ORDER — ONDANSETRON 2 MG/ML
4 INJECTION INTRAMUSCULAR; INTRAVENOUS EVERY 8 HOURS PRN
Status: DISCONTINUED | OUTPATIENT
Start: 2022-10-11 | End: 2022-10-14

## 2022-10-11 RX ORDER — OSELTAMIVIR PHOSPHATE 30 MG/1
30 CAPSULE ORAL DAILY
Status: DISCONTINUED | OUTPATIENT
Start: 2022-10-12 | End: 2022-10-13

## 2022-10-11 RX ORDER — GLUCAGON 1 MG
1 KIT INJECTION
Status: DISCONTINUED | OUTPATIENT
Start: 2022-10-11 | End: 2022-10-20 | Stop reason: HOSPADM

## 2022-10-11 RX ORDER — BUSPIRONE HYDROCHLORIDE 5 MG/1
10 TABLET ORAL DAILY
Status: DISCONTINUED | OUTPATIENT
Start: 2022-10-12 | End: 2022-10-20 | Stop reason: HOSPADM

## 2022-10-11 RX ORDER — ALBUTEROL SULFATE 2.5 MG/.5ML
2.5 SOLUTION RESPIRATORY (INHALATION)
Status: COMPLETED | OUTPATIENT
Start: 2022-10-11 | End: 2022-10-11

## 2022-10-11 RX ORDER — DULOXETIN HYDROCHLORIDE 30 MG/1
30 CAPSULE, DELAYED RELEASE ORAL DAILY
Status: DISCONTINUED | OUTPATIENT
Start: 2022-10-12 | End: 2022-10-20 | Stop reason: HOSPADM

## 2022-10-11 RX ORDER — IPRATROPIUM BROMIDE AND ALBUTEROL SULFATE 2.5; .5 MG/3ML; MG/3ML
3 SOLUTION RESPIRATORY (INHALATION) EVERY 6 HOURS PRN
Status: DISCONTINUED | OUTPATIENT
Start: 2022-10-11 | End: 2022-10-14

## 2022-10-11 RX ADMIN — ACETAMINOPHEN 650 MG: 325 TABLET, FILM COATED ORAL at 02:10

## 2022-10-11 RX ADMIN — TRAMADOL HYDROCHLORIDE 50 MG: 50 TABLET, COATED ORAL at 07:10

## 2022-10-11 RX ADMIN — SENNOSIDES AND DOCUSATE SODIUM 1 TABLET: 50; 8.6 TABLET ORAL at 09:10

## 2022-10-11 RX ADMIN — HEPARIN SODIUM 5000 UNITS: 5000 INJECTION INTRAVENOUS; SUBCUTANEOUS at 10:10

## 2022-10-11 RX ADMIN — MIRTAZAPINE 15 MG: 7.5 TABLET ORAL at 09:10

## 2022-10-11 RX ADMIN — ALBUTEROL SULFATE 2.5 MG: 2.5 SOLUTION RESPIRATORY (INHALATION) at 01:10

## 2022-10-11 RX ADMIN — OXYCODONE 5 MG: 5 TABLET ORAL at 02:10

## 2022-10-11 RX ADMIN — ACETAMINOPHEN 650 MG: 325 TABLET ORAL at 09:10

## 2022-10-11 NOTE — ED PROVIDER NOTES
Encounter Date: 10/11/2022       History     Chief Complaint   Patient presents with    Fever     C/O fever, loose cough, and SOB, hx of COPD and dialysis,  currently has the flu, 3L O2 [place] home O2, reports home pulse ox has been reading lower than usual     HPI  Katie is a 77 y.o. female with past medical history of CHF, COPD, ESRD, hypertension, migraines, PE who presents with complaint of increased shortness of breath, increased cough compared to usual and fever for the last 2 days.  She states that her  recently was diagnosed with the flu.  She is normally on 3 L of oxygen via nasal cannula at home.  Her pulse ox was reading in the 80s today.  She did breathing treatment this morning which she thought maybe helped a little bit.  She denies chest pain.  Dors endorse headache that was gradual in onset.  Review of patient's allergies indicates:   Allergen Reactions    Aspirin      Other reaction(s): hx of ulcers    Tetracycline Swelling     Other reaction(s): Swelling    Penicillins Rash     Other reaction(s): Hives  Other reaction(s): Rash  Other reaction(s): Rash  Other reaction(s): Hives     Past Medical History:   Diagnosis Date    Acute congestive heart failure 02/10/2020    Anemia     Bilateral renal cysts     Cataract     Chronic LBP 7/26/2012    Chronic pain     CKD (chronic kidney disease), stage IV     Colon polyp 2013    COPD (chronic obstructive pulmonary disease)     Dehydration     Encounter for blood transfusion     HTN (hypertension)     Lumbar spondylosis     Melanoma     of the lip    Metabolic bone disease     Migraines, neuralgic     Osteoporosis     Primary osteoarthritis of both knees     s/p Rt TKA    Pulmonary embolism with infarction     Seizures 1972    x1 only    Subdeltoid bursitis, L>R. 9/27/2012    Ulcer     Vitamin D deficiency disease      Past Surgical History:   Procedure Laterality Date    BLADDER SUSPENSION      CATARACT EXTRACTION  11/18/13    left eye    CERVICAL  LAMINECTOMY      x3, fusion x1    COLONOSCOPY  2009    DECLOTTING OF ARTERIOVENOUS GRAFT Left 1/3/2022    Procedure: [id number];  Surgeon: Lindsey Louie MD;  Location: [facility] OR;  Service: Vascular;  Laterality: Left;    DECLOTTING OF ARTERIOVENOUS GRAFT Left 2/8/2022    Procedure: [id number];  Surgeon: Lindsey Louie MD;  Location: [facility] OR;  Service: Vascular;  Laterality: Left;    DECLOTTING OF ARTERIOVENOUS GRAFT Left 4/8/2022    Procedure: [id number];  Surgeon: Lindsey Louie MD;  Location: [facility] OR;  Service: Vascular;  Laterality: Left;    FISTULOGRAM N/A 2/27/2020    Procedure: Fistulogram;  Surgeon: Noe Benitez MD;  Location: [facility] CATH LAB/EP;  Service: Cardiology;  Laterality: N/A;    HYSTERECTOMY      JOINT REPLACEMENT  2001    total right knee     LUMBAR LAMINECTOMY      x 3, fusion x1    OOPHORECTOMY      PERIPHERAL ANGIOGRAPHY N/A 3/9/2020    Procedure: Peripheral angiography;  Surgeon: Jacinto Henriquez MD;  Location: [facility] CATH LAB/EP;  Service: Cardiology;  Laterality: N/A;    PLACEMENT OF ARTERIOVENOUS GRAFT Left 1/21/2020    Procedure: INSERTION, GRAFT, ARTERIOVENOUS;  Surgeon: Lindsey Louie MD;  Location: [facility] OR;  Service: General;  Laterality: Left;    PLACEMENT OF ARTERIOVENOUS GRAFT Right 7/22/2022    Procedure: INSERTION, GRAFT, ARTERIOVENOUS;  Surgeon: Lindsey Louie MD;  Location: [facility] OR;  Service: General;  Laterality: Right;    PLACEMENT OF DUAL-LUMEN VASCULAR CATHETER Right 4/16/2022    Procedure: INSERTION-CATHETER-RICKI;  Surgeon: Lindsey Louie MD;  Location: [facility] OR;  Service: General;  Laterality: Right;    THROMBECTOMY Left 2/3/2020    Procedure: THROMBECTOMY;  Surgeon: Lindsey Louie MD;  Location: [facility] OR;  Service: General;  Laterality: Left;    THROMBECTOMY  3/9/2020    Procedure: Thrombectomy;  Surgeon: Jacinto Henriquez MD;  Location: [facility] CATH LAB/EP;  Service: Cardiology;;    THROMBECTOMY Left  4/7/2022    Procedure: THROMBECTOMY;  Surgeon: Lindsey Louie MD;  Location: [facility] OR;  Service: General;  Laterality: Left;     Family History   Problem Relation Age of Onset    Arthritis Mother     Stroke Mother     Hypertension Father     Cancer Father     Cataracts Father     Diabetes Maternal Aunt     Hypertension Maternal Grandfather     Heart disease Maternal Grandfather     Heart attack Maternal Grandfather     Cataracts Sister     Glaucoma Cousin      Social History     Tobacco Use    Smoking status: Former     Types: Cigarettes    Smokeless tobacco: Former     Quit date: 2/3/2015   Substance Use Topics    Alcohol use: Not Currently     Comment: Rare    Drug use: No     Review of Systems  Constitutional: + fever  HENT: Denies sore throat  Eyes: Denies eye pain  Respiratory: + shortness of breath  CV: Denies chest pain  GI: Denies abdominal pain, N/V/D  MSK: Denies joint pain  Skin: Denies rash  Neuro: + headache    Physical Exam     Initial Vitals [10/11/22 1202]   BP Pulse Resp Temp SpO2   (!) 131/52 (!) 120 (!) 26 98.1 °F (36.7 °C) (!) 92 %      MAP       --         Physical Exam  General: Awake and alert, well-nourished  HENT: slightly dry mucous membranes  Eyes: No conjunctival injection  Pulm: CTAB, mild tachypnea, mild increased work of breathing  CV: sinus tachycardia  Abdomen: Nondistended, non-tender to palpation  MSK: No LE edema  Skin: No rash noted  Neuro: No facial asymmetry, grossly normal movements of arms and legs  Psychiatric: Cooperative    ED Course   Procedures  Labs Reviewed   INFLUENZA A & B BY MOLECULAR - Abnormal; Notable for the following components:       Result Value    Influenza A, Molecular Positive (*)     All other components within normal limits   CBC W/ AUTO DIFFERENTIAL - Abnormal; Notable for the following components:     (*)     MCHC 30.6 (*)     Immature Granulocytes 0.8 (*)     Lymph # 0.4 (*)     Lymph % 7.9 (*)     Mono % 17.4 (*)     All other components  within normal limits   COMPREHENSIVE METABOLIC PANEL - Abnormal; Notable for the following components:    CO2 20 (*)     Creatinine 2.3 (*)     Alkaline Phosphatase 284 (*)     eGFR 21 (*)     All other components within normal limits   SARS-COV-2 RDRP GENE     EKG Readings: (Independently Interpreted)   Sinus tachycardia, normal axis, borderline prolonged corrected QT interval, otherwise normal intervals, no RVH or LVH criteria, nonspecific T wave flattening in inferior and anterior leads, no ST depression or elevation.  No STEMI.   ECG Results              EKG 12-lead (In process)  Result time 10/11/22 15:39:33      In process by Interface, Lab In [facility] (10/11/22 15:39:33)                   Narrative:    Test Reason : R06.02,    Vent. Rate : 110 BPM     Atrial Rate : 110 BPM     P-R Int : 152 ms          QRS Dur : 072 ms      QT Int : 344 ms       P-R-T Axes : 071 086 -08 degrees     QTc Int : 465 ms    Sinus tachycardia  Anterior infarct (cited on or before 11-OCT-2022)  Abnormal ECG  When compared with ECG of 15-APR-2022 13:37,  Inverted T waves have replaced nonspecific T wave abnormality in Inferior  leads  Nonspecific T wave abnormality no longer evident in Lateral leads  QT has lengthened    Referred By: [email or web address]   SELF           Confirmed By:                                   Imaging Results              X-Ray Chest AP Portable (Final result)  Result time 10/11/22 14:00:17      Final result by Gavin Ramesh MD (10/11/22 14:00:17)                   Impression:      1. Chronic appearing interstitial findings suggesting underlying COPD/emphysema.  There are bilateral pleural effusions, improved since the previous exam.      Electronically signed by: Gavin Ramesh MD  Date:    10/11/2022  Time:    14:00               Narrative:    EXAMINATION:  XR CHEST AP PORTABLE    CLINICAL HISTORY:  Shortness of breath    TECHNIQUE:  Single frontal view of the chest was  performed.    COMPARISON:  04/05/2022    FINDINGS:  The cardiomediastinal silhouette is not enlarged, stable as compared to the previous exam.  There is calcification of the aorta..  There is obscuration of the bilateral costophrenic angles suggesting effusions, left greater than right.  The left pleural effusion has improved since the previous exam..  The trachea is midline.  The lungs are symmetrically expanded bilaterally with coarse interstitial attenuation.  There is left basilar atelectasis versus developing consolidation..  There is no pneumothorax.  The osseous structures are remarkable for degenerative changes and osteopenia.  Left upper arm stent noted..                                       Medications   albuterol sulfate nebulizer solution 2.5 mg (2.5 mg Nebulization Given 10/11/22 1311)   oxyCODONE immediate release tablet 5 mg (5 mg Oral Given 10/11/22 1422)   acetaminophen tablet 650 mg (650 mg Oral Given 10/11/22 1422)     Medical Decision Making:   Initial Assessment:   Pt chronically ill appearing, does have mild increased work of breathing and mild tachypnea but lungs without wheezing and good air entry.  Suspect influenza rather than primary COPD exacerbation given recent flu exposure.    Differential Diagnosis:   Influenza, sepsis, COPD exacerbation, ACS, electrolyte abnormality, pneumonia  ED Management:  CXR without focal infiltrate.  Influenza positive.  CMP with CKD similar to baseline, elevated alk phos but otherwise normal LFTs.  CBC without acute findings.  I had nurse get pt up to side of bed and pt desatted to 84% with minimal exertion while on home O2 of 3L via NC.  Will admit pt given some increased work of breathing and hypoxia with any kind of exertion.  Gave breathing treatment.  Pt admitted to hospital medicine in stable condition.                        Clinical Impression:   Final diagnoses:  [R06.02] SOB (shortness of breath)        ED Disposition Condition    Admit                  Twan Ridlye MD  10/12/22 0484

## 2022-10-11 NOTE — ED TRIAGE NOTES
Hx copd, 3L nasal cannula use at home. Pt is reportin fatigue, sob, headache, and a fever since yesterday. Pt's  has the flu. Pt denies nvd, chest pain. MD is at the bedside

## 2022-10-12 LAB
ALBUMIN SERPL BCP-MCNC: 2.7 G/DL (ref 3.5–5.2)
ALBUMIN SERPL BCP-MCNC: 3.1 G/DL (ref 3.5–5.2)
ALP SERPL-CCNC: 258 U/L (ref 55–135)
ALP SERPL-CCNC: 305 U/L (ref 55–135)
ALT SERPL W/O P-5'-P-CCNC: 21 U/L (ref 10–44)
ALT SERPL W/O P-5'-P-CCNC: 30 U/L (ref 10–44)
ANION GAP SERPL CALC-SCNC: 10 MMOL/L (ref 8–16)
ANION GAP SERPL CALC-SCNC: 9 MMOL/L (ref 8–16)
AST SERPL-CCNC: 43 U/L (ref 10–40)
AST SERPL-CCNC: 60 U/L (ref 10–40)
BASOPHILS # BLD AUTO: 0.02 K/UL (ref 0–0.2)
BASOPHILS # BLD AUTO: 0.03 K/UL (ref 0–0.2)
BASOPHILS NFR BLD: 0.6 % (ref 0–1.9)
BASOPHILS NFR BLD: 0.8 % (ref 0–1.9)
BILIRUB SERPL-MCNC: 0.4 MG/DL (ref 0.1–1)
BILIRUB SERPL-MCNC: 0.5 MG/DL (ref 0.1–1)
BUN SERPL-MCNC: 15 MG/DL (ref 8–23)
BUN SERPL-MCNC: 23 MG/DL (ref 8–23)
CALCIUM SERPL-MCNC: 8.1 MG/DL (ref 8.7–10.5)
CALCIUM SERPL-MCNC: 9 MG/DL (ref 8.7–10.5)
CHLORIDE SERPL-SCNC: 101 MMOL/L (ref 95–110)
CHLORIDE SERPL-SCNC: 102 MMOL/L (ref 95–110)
CO2 SERPL-SCNC: 20 MMOL/L (ref 23–29)
CO2 SERPL-SCNC: 23 MMOL/L (ref 23–29)
CREAT SERPL-MCNC: 1.8 MG/DL (ref 0.5–1.4)
CREAT SERPL-MCNC: 2.1 MG/DL (ref 0.5–1.4)
DIFFERENTIAL METHOD: ABNORMAL
DIFFERENTIAL METHOD: ABNORMAL
EOSINOPHIL # BLD AUTO: 0 K/UL (ref 0–0.5)
EOSINOPHIL # BLD AUTO: 0.1 K/UL (ref 0–0.5)
EOSINOPHIL NFR BLD: 0.3 % (ref 0–8)
EOSINOPHIL NFR BLD: 1.3 % (ref 0–8)
ERYTHROCYTE [DISTWIDTH] IN BLOOD BY AUTOMATED COUNT: 13.4 % (ref 11.5–14.5)
ERYTHROCYTE [DISTWIDTH] IN BLOOD BY AUTOMATED COUNT: 13.5 % (ref 11.5–14.5)
EST. GFR  (NO RACE VARIABLE): 24 ML/MIN/1.73 M^2
EST. GFR  (NO RACE VARIABLE): 28 ML/MIN/1.73 M^2
GLUCOSE SERPL-MCNC: 62 MG/DL (ref 70–110)
GLUCOSE SERPL-MCNC: 85 MG/DL (ref 70–110)
HCT VFR BLD AUTO: 36.6 % (ref 37–48.5)
HCT VFR BLD AUTO: 41.7 % (ref 37–48.5)
HGB BLD-MCNC: 11.6 G/DL (ref 12–16)
HGB BLD-MCNC: 12.7 G/DL (ref 12–16)
IMM GRANULOCYTES # BLD AUTO: 0.05 K/UL (ref 0–0.04)
IMM GRANULOCYTES # BLD AUTO: 0.07 K/UL (ref 0–0.04)
IMM GRANULOCYTES NFR BLD AUTO: 1.3 % (ref 0–0.5)
IMM GRANULOCYTES NFR BLD AUTO: 2 % (ref 0–0.5)
LACTATE SERPL-SCNC: 1.4 MMOL/L (ref 0.5–2.2)
LYMPHOCYTES # BLD AUTO: 0.7 K/UL (ref 1–4.8)
LYMPHOCYTES # BLD AUTO: 0.9 K/UL (ref 1–4.8)
LYMPHOCYTES NFR BLD: 18.8 % (ref 18–48)
LYMPHOCYTES NFR BLD: 23.1 % (ref 18–48)
MCH RBC QN AUTO: 31.4 PG (ref 27–31)
MCH RBC QN AUTO: 31.8 PG (ref 27–31)
MCHC RBC AUTO-ENTMCNC: 30.5 G/DL (ref 32–36)
MCHC RBC AUTO-ENTMCNC: 31.7 G/DL (ref 32–36)
MCV RBC AUTO: 104 FL (ref 82–98)
MCV RBC AUTO: 99 FL (ref 82–98)
MONOCYTES # BLD AUTO: 0.7 K/UL (ref 0.3–1)
MONOCYTES # BLD AUTO: 0.7 K/UL (ref 0.3–1)
MONOCYTES NFR BLD: 19.7 % (ref 4–15)
MONOCYTES NFR BLD: 19.7 % (ref 4–15)
NEUTROPHILS # BLD AUTO: 2 K/UL (ref 1.8–7.7)
NEUTROPHILS # BLD AUTO: 2 K/UL (ref 1.8–7.7)
NEUTROPHILS NFR BLD: 53.8 % (ref 38–73)
NEUTROPHILS NFR BLD: 58.6 % (ref 38–73)
NRBC BLD-RTO: 0 /100 WBC
NRBC BLD-RTO: 0 /100 WBC
PLATELET # BLD AUTO: 121 K/UL (ref 150–450)
PLATELET # BLD AUTO: 170 K/UL (ref 150–450)
PMV BLD AUTO: 10 FL (ref 9.2–12.9)
PMV BLD AUTO: 10.5 FL (ref 9.2–12.9)
POCT GLUCOSE: 125 MG/DL (ref 70–110)
POCT GLUCOSE: 87 MG/DL (ref 70–110)
POCT GLUCOSE: 88 MG/DL (ref 70–110)
POTASSIUM SERPL-SCNC: 3.9 MMOL/L (ref 3.5–5.1)
POTASSIUM SERPL-SCNC: 4.7 MMOL/L (ref 3.5–5.1)
PROT SERPL-MCNC: 6 G/DL (ref 6–8.4)
PROT SERPL-MCNC: 7.3 G/DL (ref 6–8.4)
RBC # BLD AUTO: 3.7 M/UL (ref 4–5.4)
RBC # BLD AUTO: 4 M/UL (ref 4–5.4)
SODIUM SERPL-SCNC: 131 MMOL/L (ref 136–145)
SODIUM SERPL-SCNC: 134 MMOL/L (ref 136–145)
WBC # BLD AUTO: 3.45 K/UL (ref 3.9–12.7)
WBC # BLD AUTO: 3.76 K/UL (ref 3.9–12.7)

## 2022-10-12 PROCEDURE — 36415 COLL VENOUS BLD VENIPUNCTURE: CPT | Performed by: INTERNAL MEDICINE

## 2022-10-12 PROCEDURE — 36415 COLL VENOUS BLD VENIPUNCTURE: CPT | Performed by: STUDENT IN AN ORGANIZED HEALTH CARE EDUCATION/TRAINING PROGRAM

## 2022-10-12 PROCEDURE — 63600175 PHARM REV CODE 636 W HCPCS: Performed by: STUDENT IN AN ORGANIZED HEALTH CARE EDUCATION/TRAINING PROGRAM

## 2022-10-12 PROCEDURE — 80100016 HC MAINTENANCE HEMODIALYSIS

## 2022-10-12 PROCEDURE — 63600175 PHARM REV CODE 636 W HCPCS: Performed by: INTERNAL MEDICINE

## 2022-10-12 PROCEDURE — 80053 COMPREHEN METABOLIC PANEL: CPT | Mod: 91 | Performed by: STUDENT IN AN ORGANIZED HEALTH CARE EDUCATION/TRAINING PROGRAM

## 2022-10-12 PROCEDURE — 80053 COMPREHEN METABOLIC PANEL: CPT | Performed by: INTERNAL MEDICINE

## 2022-10-12 PROCEDURE — 27000207 HC ISOLATION

## 2022-10-12 PROCEDURE — 25000003 PHARM REV CODE 250: Performed by: STUDENT IN AN ORGANIZED HEALTH CARE EDUCATION/TRAINING PROGRAM

## 2022-10-12 PROCEDURE — 27000221 HC OXYGEN, UP TO 24 HOURS

## 2022-10-12 PROCEDURE — 94640 AIRWAY INHALATION TREATMENT: CPT

## 2022-10-12 PROCEDURE — 99900035 HC TECH TIME PER 15 MIN (STAT)

## 2022-10-12 PROCEDURE — 85025 COMPLETE CBC W/AUTO DIFF WBC: CPT | Performed by: INTERNAL MEDICINE

## 2022-10-12 PROCEDURE — 25000003 PHARM REV CODE 250: Performed by: INTERNAL MEDICINE

## 2022-10-12 PROCEDURE — 85025 COMPLETE CBC W/AUTO DIFF WBC: CPT | Mod: 91 | Performed by: STUDENT IN AN ORGANIZED HEALTH CARE EDUCATION/TRAINING PROGRAM

## 2022-10-12 PROCEDURE — 25000242 PHARM REV CODE 250 ALT 637 W/ HCPCS: Performed by: INTERNAL MEDICINE

## 2022-10-12 PROCEDURE — 20000000 HC ICU ROOM

## 2022-10-12 PROCEDURE — 94761 N-INVAS EAR/PLS OXIMETRY MLT: CPT

## 2022-10-12 PROCEDURE — 87040 BLOOD CULTURE FOR BACTERIA: CPT | Performed by: STUDENT IN AN ORGANIZED HEALTH CARE EDUCATION/TRAINING PROGRAM

## 2022-10-12 PROCEDURE — 83605 ASSAY OF LACTIC ACID: CPT | Performed by: STUDENT IN AN ORGANIZED HEALTH CARE EDUCATION/TRAINING PROGRAM

## 2022-10-12 RX ORDER — HEPARIN SODIUM 1000 [USP'U]/ML
4100 INJECTION, SOLUTION INTRAVENOUS; SUBCUTANEOUS
Status: DISCONTINUED | OUTPATIENT
Start: 2022-10-12 | End: 2022-10-20 | Stop reason: HOSPADM

## 2022-10-12 RX ORDER — SODIUM CHLORIDE 9 MG/ML
INJECTION, SOLUTION INTRAVENOUS
Status: DISCONTINUED | OUTPATIENT
Start: 2022-10-12 | End: 2022-10-20 | Stop reason: HOSPADM

## 2022-10-12 RX ORDER — NOREPINEPHRINE BITARTRATE/D5W 4MG/250ML
0-3 PLASTIC BAG, INJECTION (ML) INTRAVENOUS CONTINUOUS
Status: DISCONTINUED | OUTPATIENT
Start: 2022-10-12 | End: 2022-10-14

## 2022-10-12 RX ORDER — MUPIROCIN 20 MG/G
OINTMENT TOPICAL 2 TIMES DAILY
Status: DISPENSED | OUTPATIENT
Start: 2022-10-12 | End: 2022-10-17

## 2022-10-12 RX ORDER — SODIUM CHLORIDE 9 MG/ML
INJECTION, SOLUTION INTRAVENOUS ONCE
Status: COMPLETED | OUTPATIENT
Start: 2022-10-12 | End: 2022-10-12

## 2022-10-12 RX ORDER — CEFEPIME HYDROCHLORIDE 1 G/50ML
1 INJECTION, SOLUTION INTRAVENOUS
Status: DISCONTINUED | OUTPATIENT
Start: 2022-10-12 | End: 2022-10-16

## 2022-10-12 RX ADMIN — HEPARIN SODIUM 5000 UNITS: 5000 INJECTION INTRAVENOUS; SUBCUTANEOUS at 05:10

## 2022-10-12 RX ADMIN — CLONAZEPAM 1 MG: 0.5 TABLET ORAL at 01:10

## 2022-10-12 RX ADMIN — MUPIROCIN: 20 OINTMENT TOPICAL at 08:10

## 2022-10-12 RX ADMIN — HEPARIN SODIUM 5000 UNITS: 5000 INJECTION INTRAVENOUS; SUBCUTANEOUS at 01:10

## 2022-10-12 RX ADMIN — HEPARIN SODIUM 5000 UNITS: 5000 INJECTION INTRAVENOUS; SUBCUTANEOUS at 09:10

## 2022-10-12 RX ADMIN — ACETAMINOPHEN AND CODEINE PHOSPHATE 5 ML: 120; 12 SOLUTION ORAL at 08:10

## 2022-10-12 RX ADMIN — ATORVASTATIN CALCIUM 80 MG: 40 TABLET, FILM COATED ORAL at 08:10

## 2022-10-12 RX ADMIN — AMLODIPINE BESYLATE 5 MG: 5 TABLET ORAL at 08:10

## 2022-10-12 RX ADMIN — ONDANSETRON 4 MG: 2 INJECTION INTRAMUSCULAR; INTRAVENOUS at 09:10

## 2022-10-12 RX ADMIN — SEVELAMER CARBONATE 800 MG: 800 TABLET, FILM COATED ORAL at 05:10

## 2022-10-12 RX ADMIN — IPRATROPIUM BROMIDE AND ALBUTEROL SULFATE 3 ML: 2.5; .5 SOLUTION RESPIRATORY (INHALATION) at 07:10

## 2022-10-12 RX ADMIN — ACETAMINOPHEN AND CODEINE PHOSPHATE 5 ML: 120; 12 SOLUTION ORAL at 01:10

## 2022-10-12 RX ADMIN — SODIUM CHLORIDE 500 ML: 0.9 INJECTION, SOLUTION INTRAVENOUS at 06:10

## 2022-10-12 RX ADMIN — IPRATROPIUM BROMIDE AND ALBUTEROL SULFATE 3 ML: 2.5; .5 SOLUTION RESPIRATORY (INHALATION) at 01:10

## 2022-10-12 RX ADMIN — ACETAMINOPHEN 650 MG: 325 TABLET ORAL at 12:10

## 2022-10-12 RX ADMIN — Medication 0.1 MCG/KG/MIN: at 09:10

## 2022-10-12 RX ADMIN — DULOXETINE 30 MG: 30 CAPSULE, DELAYED RELEASE ORAL at 08:10

## 2022-10-12 RX ADMIN — SENNOSIDES AND DOCUSATE SODIUM 1 TABLET: 50; 8.6 TABLET ORAL at 08:10

## 2022-10-12 RX ADMIN — BUSPIRONE HYDROCHLORIDE 10 MG: 5 TABLET ORAL at 08:10

## 2022-10-12 RX ADMIN — SEVELAMER CARBONATE 800 MG: 800 TABLET, FILM COATED ORAL at 08:10

## 2022-10-12 RX ADMIN — MEGESTROL ACETATE 40 MG: 40 TABLET ORAL at 08:10

## 2022-10-12 RX ADMIN — SEVELAMER CARBONATE 800 MG: 800 TABLET, FILM COATED ORAL at 01:10

## 2022-10-12 RX ADMIN — SODIUM CHLORIDE 250 ML: 0.9 INJECTION, SOLUTION INTRAVENOUS at 08:10

## 2022-10-12 RX ADMIN — OSELTAMIVIR PHOSPHATE 30 MG: 30 CAPSULE ORAL at 08:10

## 2022-10-12 RX ADMIN — VANCOMYCIN HYDROCHLORIDE 750 MG: 750 INJECTION, POWDER, LYOPHILIZED, FOR SOLUTION INTRAVENOUS at 07:10

## 2022-10-12 RX ADMIN — SODIUM CHLORIDE 250 ML: 0.9 INJECTION, SOLUTION INTRAVENOUS at 07:10

## 2022-10-12 RX ADMIN — CEFEPIME 1 G: 1 INJECTION, POWDER, FOR SOLUTION INTRAMUSCULAR; INTRAVENOUS at 05:10

## 2022-10-12 RX ADMIN — SODIUM CHLORIDE: 0.9 INJECTION, SOLUTION INTRAVENOUS at 12:10

## 2022-10-12 NOTE — PROGRESS NOTES
VN NOTE   Charge nurse, bedside nurse, and MD notified of  Alert.           10/12/22 1705   Nurse Notification   Charge Nurse Notified? Yes   Name of Charge Nurse Alexander Ojeda   Bedside Nurse Notified? Yes   Name of Bedside Nurse Lara Meyer Notfication Method Secure Chat   Nurse Notified Of Other  ( Alert)   Provider Notification   Provider Notified? Yes   Name of Provider WISAM Salazar   Provider Notification Method Secure Chat   Provider Notified Of AI Deterioration Alert

## 2022-10-12 NOTE — HPI
Katie is a 77 y.o. female with past medical history of CHF, COPD, ESRD, hypertension, migraines, PE who presents with complaint of increased shortness of breath, increased cough compared to usual and fever for the last 2 days.  She states that her  recently was diagnosed with the flu.  She is normally on 3 L of oxygen via nasal cannula at home.  Her pulse ox was reading in the 80s today.  She did breathing treatments this morning which she thought maybe helped a little bit.  In the emergency department she did test positive for influenza.  She is also febrile with a T-max of 101°.  She is admitted for acute on chronic respiratory failure in the setting of influenza infection.

## 2022-10-12 NOTE — SUBJECTIVE & OBJECTIVE
Past Medical History:   Diagnosis Date    Acute congestive heart failure 02/10/2020    Anemia     Bilateral renal cysts     Cataract     Chronic LBP 7/26/2012    Chronic pain     CKD (chronic kidney disease), stage IV     Colon polyp 2013    COPD (chronic obstructive pulmonary disease)     Dehydration     Encounter for blood transfusion     HTN (hypertension)     Lumbar spondylosis     Melanoma     of the lip    Metabolic bone disease     Migraines, neuralgic     Osteoporosis     Primary osteoarthritis of both knees     s/p Rt TKA    Pulmonary embolism with infarction     Seizures 1972    x1 only    Subdeltoid bursitis, L>R. 9/27/2012    Ulcer     Vitamin D deficiency disease        Past Surgical History:   Procedure Laterality Date    BLADDER SUSPENSION      CATARACT EXTRACTION  11/18/13    left eye    CERVICAL LAMINECTOMY      x3, fusion x1    COLONOSCOPY  2009    DECLOTTING OF ARTERIOVENOUS GRAFT Left 1/3/2022    Procedure: [id number];  Surgeon: Lindsey Louie MD;  Location: [facility] OR;  Service: Vascular;  Laterality: Left;    DECLOTTING OF ARTERIOVENOUS GRAFT Left 2/8/2022    Procedure: [id number];  Surgeon: Lindsey Louie MD;  Location: [facility] OR;  Service: Vascular;  Laterality: Left;    DECLOTTING OF ARTERIOVENOUS GRAFT Left 4/8/2022    Procedure: [id number];  Surgeon: Lindsey Louie MD;  Location: [facility] OR;  Service: Vascular;  Laterality: Left;    FISTULOGRAM N/A 2/27/2020    Procedure: Fistulogram;  Surgeon: Noe Benitez MD;  Location: [facility] CATH LAB/EP;  Service: Cardiology;  Laterality: N/A;    HYSTERECTOMY      JOINT REPLACEMENT  2001    total right knee     LUMBAR LAMINECTOMY      x 3, fusion x1    OOPHORECTOMY      PERIPHERAL ANGIOGRAPHY N/A 3/9/2020    Procedure: Peripheral angiography;  Surgeon: Jacinto Henriquez MD;  Location: [facility] CATH LAB/EP;  Service: Cardiology;  Laterality: N/A;    PLACEMENT OF ARTERIOVENOUS GRAFT  Left 1/21/2020    Procedure: INSERTION, GRAFT, ARTERIOVENOUS;  Surgeon: Lindsey Louie MD;  Location: [facility] OR;  Service: General;  Laterality: Left;    PLACEMENT OF ARTERIOVENOUS GRAFT Right 7/22/2022    Procedure: INSERTION, GRAFT, ARTERIOVENOUS;  Surgeon: Lindsey Louie MD;  Location: [facility] OR;  Service: General;  Laterality: Right;    PLACEMENT OF DUAL-LUMEN VASCULAR CATHETER Right 4/16/2022    Procedure: INSERTION-CATHETER-RICKI;  Surgeon: Lindsey Louie MD;  Location: [facility] OR;  Service: General;  Laterality: Right;    THROMBECTOMY Left 2/3/2020    Procedure: THROMBECTOMY;  Surgeon: Lindsey Louie MD;  Location: [facility] OR;  Service: General;  Laterality: Left;    THROMBECTOMY  3/9/2020    Procedure: Thrombectomy;  Surgeon: Jacinto Henriquez MD;  Location: [facility] CATH LAB/EP;  Service: Cardiology;;    THROMBECTOMY Left 4/7/2022    Procedure: THROMBECTOMY;  Surgeon: Lindsey Louie MD;  Location: [facility] OR;  Service: General;  Laterality: Left;       Review of patient's allergies indicates:   Allergen Reactions    Aspirin      Other reaction(s): hx of ulcers    Tetracycline Swelling     Other reaction(s): Swelling    Penicillins Rash     Other reaction(s): Hives  Other reaction(s): Rash  Other reaction(s): Rash  Other reaction(s): Hives       No current facility-administered medications on file prior to encounter.     Current Outpatient Medications on File Prior to Encounter   Medication Sig    acetaminophen (TYLENOL) 325 MG tablet Take 1 tablet (325 mg total) by mouth every 6 (six) hours as needed for Pain.    albuterol (VENTOLIN HFA) 90 mcg/actuation inhaler Inhale 2 puffs into the lungs every 6 (six) hours as needed for Wheezing or Shortness of Breath. Rescue    albuterol-ipratropium (DUO-NEB) 2.5 mg-0.5 mg/3 mL nebulizer solution Take 3 mLs by nebulization every 6 (six) hours as needed for Shortness of Breath. Rescue    allopurinoL (ZYLOPRIM) 100 MG tablet Take 1 tablet (100  mg total) by mouth on Tuesday, Thursday, Saturday, and Sunday.    amLODIPine (NORVASC) 5 MG tablet Take 5 mg by mouth once daily.    atorvastatin (LIPITOR) 80 MG tablet Take 80 mg by mouth once daily.    busPIRone (BUSPAR) 10 MG tablet Take 10 mg by mouth once daily.    ciprofloxacin HCl (CIPRO) 250 MG tablet Take 1 tablet by mouth every day for 7 days (Patient not taking: Reported on 10/4/2022)    ciprofloxacin HCl (CIPRO) 250 MG tablet Take 1 tablet by mouth every day for 7 days (Patient not taking: Reported on 10/4/2022)    clonazePAM (KLONOPIN) 1 MG tablet Take 1 mg by mouth 2 (two) times daily as needed for Anxiety.    diclofenac sodium (VOLTAREN) 1 % Gel Apply 2 g topically 3 (three) times daily as needed (Apply to painful joints).    diphenhydrAMINE (BENADRYL) 25 mg capsule Take 25 mg by mouth every 6 (six) hours as needed for Itching.    DULoxetine (CYMBALTA) 30 MG capsule Take 1 capsule (30 mg total) by mouth once daily.    epoetin hemant-epbx (RETACRIT) 10,000 unit/mL imjection Inject 0.5 mLs (5,000 Units total) into the skin every Mon, Wed, Fri.    fentaNYL (SUBLIMAZE) 50 mcg/mL injection     fluticasone-umeclidin-vilanter (TRELEGY ELLIPTA) 200-62.5-25 mcg inhaler Inhale 1 puff into the lungs once daily.    heparin sodium,porcine (HEPARIN, PORCINE,) 1,000 unit/mL injection     heparin sodium,porcine (HEPARIN, PORCINE,) 10,000 unit/mL injection     HYDROcodone-acetaminophen (NORCO)  mg per tablet Take 1 tablet by mouth 3 (three) times daily as needed for Pain.    levoFLOXacin (LEVAQUIN) 750 MG tablet Take 750 mg by mouth once daily.    megestroL (MEGACE) 40 MG Tab Take 40 mg by mouth once daily.    mirtazapine (REMERON) 15 MG tablet Take 1 tablet (15 mg total) by mouth every evening.    ondansetron 4 mg/2 mL Soln     phenazopyridine (PYRIDIUM) 100 MG tablet Take 1 tablet by mouth for 1 dose    PHENYLephrine (ANJUM-SYNEPHRINE) 10 mg/mL injection     propofoL (DIPRIVAN) 10 mg/mL  infusion     sevelamer carbonate (RENVELA) 800 mg Tab Take 1 tablet (800 mg total) by mouth after meals as needed.    vancomycin (VANCOCIN) 1,000 mg injection      Family History       Problem Relation (Age of Onset)    Arthritis Mother    Cancer Father    Cataracts Father, Sister    Diabetes Maternal Aunt    Glaucoma Cousin    Heart attack Maternal Grandfather    Heart disease Maternal Grandfather    Hypertension Father, Maternal Grandfather    Stroke Mother          Tobacco Use    Smoking status: Former     Types: Cigarettes    Smokeless tobacco: Former     Quit date: 2/3/2015   Substance and Sexual Activity    Alcohol use: Not Currently     Comment: Rare    Drug use: No    Sexual activity: Never     Partners: Male     Review of Systems   Constitutional:  Positive for chills, fatigue and fever.   HENT: Negative.     Respiratory:  Positive for cough, shortness of breath and wheezing.    Cardiovascular:  Positive for chest pain.   Gastrointestinal: Negative.    Genitourinary: Negative.    Musculoskeletal:  Positive for arthralgias and back pain.   Neurological:  Positive for dizziness.   Psychiatric/Behavioral: Negative.     Objective:     Vital Signs (Most Recent):  Temp: (!) 101 °F (38.3 °C) (10/11/22 2101)  Pulse: 94 (10/11/22 2018)  Resp: 18 (10/11/22 2018)  BP: (!) 145/78 (10/11/22 2018)  SpO2: 97 % (10/11/22 2018)   Vital Signs (24h Range):  Temp:  [98.1 °F (36.7 °C)-101 °F (38.3 °C)] 101 °F (38.3 °C)  Pulse:  [] 94  Resp:  [17-26] 18  SpO2:  [92 %-98 %] 97 %  BP: (112-145)/(52-80) 145/78     Weight: 40.8 kg (90 lb)  Body mass index is 16.46 kg/m².    Physical Exam  Constitutional:       Appearance: She is ill-appearing and diaphoretic.   HENT:      Head: Normocephalic and atraumatic.   Eyes:      Extraocular Movements: Extraocular movements intact.      Pupils: Pupils are equal, round, and reactive to light.   Cardiovascular:      Rate and Rhythm: Regular rhythm.      Pulses: Normal pulses.    Pulmonary:      Breath sounds: Wheezing and rhonchi present.   Abdominal:      General: Abdomen is flat.   Musculoskeletal:      Cervical back: Normal range of motion and neck supple.   Skin:     General: Skin is warm.   Neurological:      General: No focal deficit present.      Mental Status: Mental status is at baseline.   Psychiatric:         Mood and Affect: Mood normal.         Behavior: Behavior normal.         CRANIAL NERVES     CN III, IV, VI   Pupils are equal, round, and reactive to light.     Significant Labs: All pertinent labs within the past 24 hours have been reviewed.    Significant Imaging: I have reviewed all pertinent imaging results/findings within the past 24 hours.

## 2022-10-12 NOTE — CONSULTS
Nephrology Consult  H&P      Consult Requested By: Miranda Gill MD  Reason for Consult: ESRD     SUBJECTIVE:     History of Present Illness:  Katie Quevedo is a 76 y.o.   female who  has a past medical history of  ESRD on HD via [facility] with Dr Aura Diggs, Acute congestive heart failure (02/10/2020), Anemia, Bilateral renal cysts, Cataract, Chronic LBP (7/26/2012), Chronic pain, CKD (chronic kidney disease), stage IV, Colon polyp (2013), COPD (chronic obstructive pulmonary disease), Dehydration, Encounter for blood transfusion, HTN (hypertension), Lumbar spondylosis, Melanoma, Metabolic bone disease, Migraines, neuralgic, Osteoporosis, Primary osteoarthritis of both knees, Pulmonary embolism with infarction, Seizures (1972), Subdeltoid bursitis, L>R. (9/27/2012), Ulcer, and Vitamin D deficiency disease.. The patient presented to the [facility] on 4/5/2022 with a primary complaint of SOB  cough Flue+ LAST  HD Monday.    ?    Review of Systems   Constitutional:  Positive for malaise/fatigue. Negative for chills and fever.   HENT:  Negative for congestion and sore throat.    Eyes:  Negative for blurred vision, double vision and photophobia.   Respiratory:  Positive for cough and shortness of breath.    Cardiovascular:  Negative for chest pain, palpitations and leg swelling.   Gastrointestinal:  Negative for abdominal pain, diarrhea, nausea and vomiting.   Genitourinary:  Negative for dysuria and urgency.   Musculoskeletal:  Negative for joint pain and myalgias.   Skin:  Negative for itching and rash.   Neurological:  Negative for dizziness, sensory change, weakness and headaches.   Endo/Heme/Allergies:  Negative for polydipsia. Does not bruise/bleed easily.   Psychiatric/Behavioral:  Negative for depression.      Past Medical History:   Diagnosis Date    Acute congestive heart failure 02/10/2020    Anemia     Bilateral renal cysts     Cataract     Chronic LBP 7/26/2012    Chronic pain     CKD (chronic  kidney disease), stage IV     Colon polyp 2013    COPD (chronic obstructive pulmonary disease)     Dehydration     Encounter for blood transfusion     HTN (hypertension)     Lumbar spondylosis     Melanoma     of the lip    Metabolic bone disease     Migraines, neuralgic     Osteoporosis     Primary osteoarthritis of both knees     s/p Rt TKA    Pulmonary embolism with infarction     Seizures 1972    x1 only    Subdeltoid bursitis, L>R. 9/27/2012    Ulcer     Vitamin D deficiency disease      Past Surgical History:   Procedure Laterality Date    BLADDER SUSPENSION      CATARACT EXTRACTION  11/18/13    left eye    CERVICAL LAMINECTOMY      x3, fusion x1    COLONOSCOPY  2009    DECLOTTING OF ARTERIOVENOUS GRAFT Left 1/3/2022    Procedure: [id number];  Surgeon: Lindsey Louie MD;  Location: [facility] OR;  Service: Vascular;  Laterality: Left;    DECLOTTING OF ARTERIOVENOUS GRAFT Left 2/8/2022    Procedure: [id number];  Surgeon: Lindsey Louie MD;  Location: [facility] OR;  Service: Vascular;  Laterality: Left;    FISTULOGRAM N/A 2/27/2020    Procedure: Fistulogram;  Surgeon: Noe Benitez MD;  Location: [facility] CATH LAB/EP;  Service: Cardiology;  Laterality: N/A;    HYSTERECTOMY      JOINT REPLACEMENT  2001    total right knee     LUMBAR LAMINECTOMY      x 3, fusion x1    OOPHORECTOMY      PERIPHERAL ANGIOGRAPHY N/A 3/9/2020    Procedure: Peripheral angiography;  Surgeon: Jacinto Henriquez MD;  Location: [facility] CATH LAB/EP;  Service: Cardiology;  Laterality: N/A;    PLACEMENT OF ARTERIOVENOUS GRAFT Left 1/21/2020    Procedure: INSERTION, GRAFT, ARTERIOVENOUS;  Surgeon: Lindsey Louie MD;  Location: [facility] OR;  Service: General;  Laterality: Left;    THROMBECTOMY Left 2/3/2020    Procedure: THROMBECTOMY;  Surgeon: Lindsey Louie MD;  Location: [facility] OR;  Service: General;  Laterality: Left;    THROMBECTOMY  3/9/2020    Procedure: Thrombectomy;  Surgeon: Jacinto Henriquez MD;  Location: [facility] CATH  "LAB/EP;  Service: Cardiology;;     Family History   Problem Relation Age of Onset    Arthritis Mother     Stroke Mother     Hypertension Father     Cancer Father     Cataracts Father     Diabetes Maternal Aunt     Hypertension Maternal Grandfather     Heart disease Maternal Grandfather     Heart attack Maternal Grandfather     Cataracts Sister     Glaucoma Cousin      Social History     Tobacco Use    Smoking status: Former Smoker     Types: Cigarettes    Smokeless tobacco: Former User     Quit date: 2/3/2015   Substance Use Topics    Alcohol use: Not Currently     Comment: Rare    Drug use: No       Review of patient's allergies indicates:   Allergen Reactions    Aspirin      Other reaction(s): hx of ulcers    Tetracycline Swelling     Other reaction(s): Swelling    Penicillins Rash     Other reaction(s): Hives  Other reaction(s): Rash  Other reaction(s): Rash  Other reaction(s): Hives            OBJECTIVE:     Vital Signs (Most Recent)  Vitals:    04/05/22 1311 04/05/22 1321   Pulse: 99 94   Resp: (!) 32 18   SpO2: 98% 99%   Weight: 40.8 kg (90 lb)    Height: 5' 2" (1.575 m)                  Medications:          Physical Exam  Vitals and nursing note reviewed.   Constitutional:       General: She is not in acute distress.     Appearance: She is ill-appearing. She is not diaphoretic.   HENT:      Head: Normocephalic and atraumatic.      Mouth/Throat:      Pharynx: No oropharyngeal exudate.   Eyes:      General: No scleral icterus.     Conjunctiva/sclera: Conjunctivae normal.      Pupils: Pupils are equal, round, and reactive to light.   Cardiovascular:      Rate and Rhythm: Normal rate and regular rhythm.      Heart sounds: Normal heart sounds. No murmur heard.  Pulmonary:      Effort: Pulmonary effort is normal. No respiratory distress.      Breath sounds: Wheezing present.   Abdominal:      General: Bowel sounds are normal. There is no distension.      Palpations: Abdomen is soft.      Tenderness: There is no " abdominal tenderness.   Musculoskeletal:         General: Normal range of motion.      Cervical back: Normal range of motion and neck supple.      Comments: AVF no thrill    Skin:     General: Skin is warm and dry.      Findings: No erythema.   Neurological:      Mental Status: She is alert and oriented to person, place, and time.      Cranial Nerves: No cranial nerve deficit.   Psychiatric:         Mood and Affect: Affect normal.         Cognition and Memory: Memory normal.         Judgment: Judgment normal.       Laboratory:  No results for input(s): WBC, HGB, HCT, PLT, NEUTOPHILPCT, MONO in the last 168 hours.    Invalid input(s):  EOSINOPHIL  No results for input(s): NA, K, CL, CO2, BUN, CREATININE, GLUCOSE, CALCIUM, PHOS in the last 168 hours.    Invalid input(s): EGFR, LABALBU    Diagnostic Results:  X-Ray: Reviewed  US: Reviewed  Echo: Reviewed  ASSESSMENT/PLAN:     1. ESRD  - HD [facility] With Dr Aura Diggs   -- Last HD Monday now SOB hypoxia Flue + HD with [facility] today   -- Keep MWF   -- Daily Renal Function Panel  -- Avoid Hypotension.  -- Renally dose all meds  2. HTN (I10) -  Controlled   3. Anemia of chronic kidney disease treated with BILL (N18.9 D63.1) -   [facility]  with   HD as needed    No results for input(s): HGB, HCT, PLT in the last 168 hours.    Iron   Lab Results   Component Value Date    IRON 13 (L) 02/12/2020    TIBC 462 (H) 02/12/2020    FERRITIN 148 07/17/2019       4. MBD (E88.9 M90.80) -  No results for input(s): PTH, CALCIUM, CAION, PHOS in the last 168 hours.  No results for input(s): MG in the last 168 hours.    Lab Results   Component Value Date    .0 (H) 12/28/2018    CALCIUM 10.2 02/08/2022    PHOS 5.6 (H) 02/08/2022     Lab Results   Component Value Date    [id number] 26 (L) 02/12/2020     Sevelamer   Lab Results   Component Value Date    CO2 22 (L) 02/08/2022       5. Nutrition/Hypoalbuminemia (E88.09) -   No results for input(s): LABPROT, ALBUMIN in the last 168  hours.  Nepro with meals TID. Renal vitamins daily      Thank you for the consult, will follow  With any question please call [phone]  Ramo Da Silva MD    [facility]  EDGARD Bustillos MD, NEIL SAMS MD,   MD CURTIS Benoit MD E. V. Harmon, NP    [address]  [place], [address]  [phone]

## 2022-10-12 NOTE — PROGRESS NOTES
10/12/22 1245   Vital Signs   Temp 97.9 °F (36.6 °C)   Temp src Temporal   Pulse 98   Heart Rate Source Right;Brachial;NIBP   Resp 16   Flow (L/min) 3   O2 Device (Oxygen Therapy) nasal cannula   BP (!) 95/53   BP Location Right arm   BP Method Automatic   Patient Position Lying   Pain Reassessment   Pain Rating Prior to Med Admin 8   Post-Hemodialysis Assessment   Rinseback Volume (mL) 250 mL   Blood Volume Processed (Liters) 28 L   Dialyzer Clearance Clear   Duration of Treatment 90 minutes   Additional Fluid Intake (mL) 500 mL   Total UF (mL) 500 mL   Net Fluid Removal 0   Patient Response to Treatment well   Post-Hemodialysis Comments pt a+ox3, cardiac irreg, bbs clear, diminshed @ bases, abd soft with + bowel sounds

## 2022-10-12 NOTE — SUBJECTIVE & OBJECTIVE
Interval History:  No acute events overnight.  Shortness of breath, myalgia persists today.  Uses 3 L oxygen at baseline.  Vaccinated for flu about 4 months ago.  Appetite is poor.  Otherwise denies any new complaints.    Review of Systems   Constitutional:  Positive for activity change, appetite change and fatigue.   Eyes:  Negative for visual disturbance.   Respiratory:  Positive for cough and shortness of breath.    Cardiovascular:  Negative for chest pain and leg swelling.   Gastrointestinal:  Negative for abdominal pain, diarrhea, nausea and vomiting.   Genitourinary:  Negative for dysuria, frequency and urgency.   Musculoskeletal:  Negative for back pain.   Neurological:  Negative for light-headedness and headaches.   Psychiatric/Behavioral:  Negative for confusion.    Objective:     Vital Signs (Most Recent):  Temp: 97.6 °F (36.4 °C) (10/12/22 1301)  Pulse: 100 (10/12/22 1601)  Resp: 18 (10/12/22 1317)  BP: (!) 107/56 (10/12/22 1301)  SpO2: (!) 94 % (10/12/22 1317)   Vital Signs (24h Range):  Temp:  [96.8 °F (36 °C)-101 °F (38.3 °C)] 97.6 °F (36.4 °C)  Pulse:  [] 100  Resp:  [16-19] 18  SpO2:  [91 %-98 %] 94 %  BP: ()/(50-80) 107/56     Weight: 40.8 kg (90 lb)  Body mass index is 16.46 kg/m².    Intake/Output Summary (Last 24 hours) at 10/12/2022 1628  Last data filed at 10/12/2022 1245  Gross per 24 hour   Intake 500 ml   Output 500 ml   Net 0 ml      Physical Exam  Vitals and nursing note reviewed.   Constitutional:       General: She is not in acute distress.     Appearance: She is ill-appearing.      Comments: Somnolent, awakens to verbal commands   HENT:      Head: Normocephalic and atraumatic.      Mouth/Throat:      Mouth: Mucous membranes are dry.   Eyes:      General: No scleral icterus.  Cardiovascular:      Rate and Rhythm: Normal rate and regular rhythm.      Pulses: Normal pulses.      Heart sounds: Normal heart sounds. No murmur heard.  Pulmonary:      Effort: Pulmonary effort is  normal. No respiratory distress.      Breath sounds: Wheezing present.   Abdominal:      Palpations: Abdomen is soft.      Tenderness: There is no abdominal tenderness.   Musculoskeletal:      Cervical back: Neck supple.      Right lower leg: No edema.      Left lower leg: No edema.   Skin:     General: Skin is warm and dry.      Findings: No bruising or rash.   Neurological:      Mental Status: She is oriented to person, place, and time.      Comments: Moves all extremities voluntarily   Psychiatric:         Mood and Affect: Mood normal.       Significant Labs: All pertinent labs within the past 24 hours have been reviewed.    Significant Imaging: I have reviewed all pertinent imaging results/findings within the past 24 hours.
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
## 2022-10-12 NOTE — PROCEDURES
"Patient seen and examined on HD tolerating well. No complains.   BP (!) 100/56   Pulse 99   Temp 98 °F (36.7 °C) (Temporal)   Resp 18   Ht 5' 2" (1.575 m)   Wt 40.8 kg (90 lb)   LMP  (LMP Unknown)   SpO2 (!) 91%   BMI 16.46 kg/m²     With any question please call answering service [phone]  Ramo Da Silva MD    [facility]  EDGARD Bustillos MD, FACNEIL ECHEVARRIA MD,   MD CURTIS Benoit MD E. V. Harmon, NP    [address]  [place], [address]   "

## 2022-10-12 NOTE — PROGRESS NOTES
Pharmacist Renal Dose Adjustment Note    Katie Quevedo is a 77 y.o. female being treated with the medication Oseltamivir    Patient Data:    Vital Signs (Most Recent):  Temp: (!) 101 °F (38.3 °C) (10/11/22 2018)  Pulse: 94 (10/11/22 2018)  Resp: 18 (10/11/22 2018)  BP: (!) 145/78 (10/11/22 2018)  SpO2: 97 % (10/11/22 2018)   Vital Signs (72h Range):  Temp:  [98.1 °F (36.7 °C)-101 °F (38.3 °C)]   Pulse:  []   Resp:  [17-26]   BP: (112-145)/(52-80)   SpO2:  [92 %-98 %]      Recent Labs   Lab 10/11/22  1245   CREATININE 2.3*     Serum creatinine: 2.3 mg/dL (H) 10/11/22 1245  Estimated creatinine clearance: 12.8 mL/min (A)    Medication:Oseltamivir dose: 75 mg frequency daily will be changed to medication:Oseltamivir dose:30 mg frequency:daily    Pharmacist's Name: Varinder Gudino  Pharmacist's Extension: 3029

## 2022-10-12 NOTE — PLAN OF CARE
[facility] - Telemetry  Initial Discharge Assessment       Primary Care Provider: Kingston Verduzco MD    Admission Diagnosis: SOB (shortness of breath) [R06.02]    Admission Date: 10/11/2022  Expected Discharge Date:     TN attempted DCA with pt at 1158 am. Pt was off unit. Attempt to contact daughter. NO answer VM left. Attempt to call spouse. No answer.    Attempted via Vidyoconnect with patient was asleep and did not respond to stimulation. Will update with family. Pt reported to wear oxygen at home.         Payor: [employer] MEDICARE / Plan: [employer] MEDICARE HMO / Product Type: Capitation /     Extended Emergency Contact Information  Primary Emergency Contact: SaeDre  Address: [address] [place] [address] [place] [address] [facility]  Home Phone: [phone]  Mobile Phone: [phone]  Relation: Spouse  Secondary Emergency Contact: Chintan Stack   [place]  Mobile Phone: [phone]  Relation: Daughter    Discharge Plan A: (P) Home, Home with family, Home Health         [facility] - [place] - [address]  [address]  [place] [address]  Phone: [phone] Fax: [phone]    [facility]  [address]  [place] [address]  Phone: [phone] Fax: [phone]    [id number] - [place] - [address]  [address] [address] [facility]  [place] [address]  Phone: [phone] Fax: [phone]      Initial Assessment (most recent)       Adult Discharge Assessment - 10/12/22 1613          Discharge Assessment    Assessment Type Discharge Planning Assessment (P)      Source of Information health record (P)      Lives With spouse (P)      Equipment Currently Used at Home oxygen (P)    3L per NC    Discharge Plan A Home;Home with family;Home Health (P)                         Future Appointments   Date Time Provider Department Center   10/13/2022  9:40 AM APPOINTMENT [facility] BUENROSTRO [facility] LAB [facility]  "  10/13/2022 10:00 AM CARDIOLOGY, ECHO [facility] CARD [facility]   10/13/2022 11:00 AM [facility], HOLTER MONITORS [facility] CARD [facility]   10/28/2022 10:30 AM Clemencia Gambino MD [facility] IMPRI [facility]   11/2/2022  8:30 AM Maritza Calhoun MD [facility] ORTHO [place]   12/6/2022 10:20 AM Jacinto Henriquez MD [facility] CARDIO [facility]     BP (!) 107/56 (BP Location: Right arm, Patient Position: Lying)   Pulse 100   Temp 97.6 °F (36.4 °C) (Oral)   Resp 18   Ht 5' 2" (1.575 m)   Wt 40.8 kg (90 lb)   LMP  (LMP Unknown)   SpO2 (!) 94%   BMI 16.46 kg/m²      albuterol-ipratropium  3 mL Nebulization Q6H WAKE    amLODIPine  5 mg Oral Daily    atorvastatin  80 mg Oral Daily    busPIRone  10 mg Oral Daily    DULoxetine  30 mg Oral Daily    heparin (porcine)  5,000 Units Subcutaneous Q8H    megestroL  40 mg Oral Daily    mirtazapine  15 mg Oral QHS    mupirocin   Nasal BID    oseltamivir  30 mg Oral Daily    senna-docusate 8.6-50 mg  1 tablet Oral BID    sevelamer carbonate  800 mg Oral TID WM               "

## 2022-10-12 NOTE — PROGRESS NOTES
[facility] Medicine  Progress Note    Patient Name: Katie Quevedo  MRN: [id number]  Patient Class: IP- Inpatient   Admission Date: 10/11/2022  Length of Stay: 1 days  Attending Physician: Andreia Salazar MD  Primary Care Provider: Kingston Verduzco MD        Subjective:     Principal Problem:Acute on chronic respiratory failure    HPI:  Katie is a 77 y.o. female with past medical history of CHF, COPD, ESRD, hypertension, migraines, PE who presents with complaint of increased shortness of breath, increased cough compared to usual and fever for the last 2 days.  She states that her  recently was diagnosed with the flu.  She is normally on 3 L of oxygen via nasal cannula at home.  Her pulse ox was reading in the 80s today.  She did breathing treatments this morning which she thought maybe helped a little bit.  In the emergency department she did test positive for influenza.  She is also febrile with a T-max of 101°.  She is admitted for acute on chronic respiratory failure in the setting of influenza infection.      Overview/Hospital Course:  No notes on file    Interval History:  No acute events overnight.  Shortness of breath, myalgia persists today.  Uses 3 L oxygen at baseline.  Vaccinated for flu about 4 months ago.  Appetite is poor.  Otherwise denies any new complaints.    Review of Systems   Constitutional:  Positive for activity change, appetite change and fatigue.   Eyes:  Negative for visual disturbance.   Respiratory:  Positive for cough and shortness of breath.    Cardiovascular:  Negative for chest pain and leg swelling.   Gastrointestinal:  Negative for abdominal pain, diarrhea, nausea and vomiting.   Genitourinary:  Negative for dysuria, frequency and urgency.   Musculoskeletal:  Negative for back pain.   Neurological:  Negative for light-headedness and headaches.   Psychiatric/Behavioral:  Negative for confusion.    Objective:     Vital Signs (Most Recent):  Temp: 97.6 °F (36.4 °C)  (10/12/22 1301)  Pulse: 100 (10/12/22 1601)  Resp: 18 (10/12/22 1317)  BP: (!) 107/56 (10/12/22 1301)  SpO2: (!) 94 % (10/12/22 1317)   Vital Signs (24h Range):  Temp:  [96.8 °F (36 °C)-101 °F (38.3 °C)] 97.6 °F (36.4 °C)  Pulse:  [] 100  Resp:  [16-19] 18  SpO2:  [91 %-98 %] 94 %  BP: ()/(50-80) 107/56     Weight: 40.8 kg (90 lb)  Body mass index is 16.46 kg/m².    Intake/Output Summary (Last 24 hours) at 10/12/2022 1628  Last data filed at 10/12/2022 1245  Gross per 24 hour   Intake 500 ml   Output 500 ml   Net 0 ml      Physical Exam  Vitals and nursing note reviewed.   Constitutional:       General: She is not in acute distress.     Appearance: She is ill-appearing.      Comments: Somnolent, awakens to verbal commands   HENT:      Head: Normocephalic and atraumatic.      Mouth/Throat:      Mouth: Mucous membranes are dry.   Eyes:      General: No scleral icterus.  Cardiovascular:      Rate and Rhythm: Normal rate and regular rhythm.      Pulses: Normal pulses.      Heart sounds: Normal heart sounds. No murmur heard.  Pulmonary:      Effort: Pulmonary effort is normal. No respiratory distress.      Breath sounds: Wheezing present.   Abdominal:      Palpations: Abdomen is soft.      Tenderness: There is no abdominal tenderness.   Musculoskeletal:      Cervical back: Neck supple.      Right lower leg: No edema.      Left lower leg: No edema.   Skin:     General: Skin is warm and dry.      Findings: No bruising or rash.   Neurological:      Mental Status: She is oriented to person, place, and time.      Comments: Moves all extremities voluntarily   Psychiatric:         Mood and Affect: Mood normal.       Significant Labs: All pertinent labs within the past 24 hours have been reviewed.    Significant Imaging: I have reviewed all pertinent imaging results/findings within the past 24 hours.    Assessment/Plan:      * Acute on chronic respiratory failure  Likely secondary to influenza infection.  Vaccinated  for influenza about 4 months ago.  Started on assault him a year on admission to complete 5 days   Continue scheduled inhalers    Reported to be on 2 L oxygen at baseline for COPD.  Patient states she normally uses 3 L at baseline.  Consider adding steroids for presumed COPD exacerbation, if symptoms do not improve with antiviral and symptomatic management.  Low clinical suspicion for secondary bacterial infection at this time.    Influenza A virus present  - will treat influenza with a five-day course of Tamiflu.  - Tylenol with codeine for cough.        ESRD (end stage renal disease) on dialysis  Nephrology consulted        VTE Risk Mitigation (From admission, onward)           Ordered     heparin (porcine) injection 4,100 Units  As needed (PRN)         10/12/22 1215     heparin (porcine) injection 5,000 Units  Every 8 hours         10/11/22 2039     IP VTE HIGH RISK PATIENT  Once         10/11/22 2039     Place sequential compression device  Until discontinued         10/11/22 2039                    Discharge Planning   LAITH:      Code Status: Full Code   Is the patient medically ready for discharge?:     Reason for patient still in hospital (select all that apply): Patient trending condition  Discharge Plan A: Home, Home with family, Home Health          Andreia Salazar MD  Department of Hospital Medicine   [facility]

## 2022-10-12 NOTE — NURSING
Pt arrived to unit at 1810, accompanied by floor staff. Transferred to ICU bed w/ 4 RN assist. Placed on cardiac monitor, VSS, see flow sheet for details. AAOx4, moves all extremities, LSCTAB, infrequent, non-productive cough noted. Daughter at bedside. Safety precautions in place, ambu bag at bedside, bed locked in lowest setting, siderails upx2. Oriented to unit and call bell and instructed to call PRN. Verbalized understanding.

## 2022-10-12 NOTE — H&P
[facility] Medicine  History & Physical    Patient Name: Katie Quevedo  MRN: [id number]  Patient Class: IP- Inpatient  Admission Date: 10/11/2022  Attending Physician: Andreia Salazar MD   Primary Care Provider: Kingston Verduzco MD         Patient information was obtained from ER records.     Subjective:     Principal Problem:Acute on chronic respiratory failure    Chief Complaint:   Chief Complaint   Patient presents with    Fever     C/O fever, loose cough, and SOB, hx of COPD and dialysis,  currently has the flu, 3L O2 [place] home O2, reports home pulse ox has been reading lower than usual        HPI: Katie is a 77 y.o. female with past medical history of CHF, COPD, ESRD, hypertension, migraines, PE who presents with complaint of increased shortness of breath, increased cough compared to usual and fever for the last 2 days.  She states that her  recently was diagnosed with the flu.  She is normally on 3 L of oxygen via nasal cannula at home.  Her pulse ox was reading in the 80s today.  She did breathing treatments this morning which she thought maybe helped a little bit.  In the emergency department she did test positive for influenza.  She is also febrile with a T-max of 101°.  She is admitted for acute on chronic respiratory failure in the setting of influenza infection.      Past Medical History:   Diagnosis Date    Acute congestive heart failure 02/10/2020    Anemia     Bilateral renal cysts     Cataract     Chronic LBP 7/26/2012    Chronic pain     CKD (chronic kidney disease), stage IV     Colon polyp 2013    COPD (chronic obstructive pulmonary disease)     Dehydration     Encounter for blood transfusion     HTN (hypertension)     Lumbar spondylosis     Melanoma     of the lip    Metabolic bone disease     Migraines, neuralgic     Osteoporosis     Primary osteoarthritis of both knees     s/p Rt TKA    Pulmonary embolism with infarction     Seizures 1972    x1 only     Subdeltoid bursitis, L>R. 9/27/2012    Ulcer     Vitamin D deficiency disease        Past Surgical History:   Procedure Laterality Date    BLADDER SUSPENSION      CATARACT EXTRACTION  11/18/13    left eye    CERVICAL LAMINECTOMY      x3, fusion x1    COLONOSCOPY  2009    DECLOTTING OF ARTERIOVENOUS GRAFT Left 1/3/2022    Procedure: [id number];  Surgeon: Lindsey Louie MD;  Location: [facility] OR;  Service: Vascular;  Laterality: Left;    DECLOTTING OF ARTERIOVENOUS GRAFT Left 2/8/2022    Procedure: [id number];  Surgeon: Lindsey Louie MD;  Location: [facility] OR;  Service: Vascular;  Laterality: Left;    DECLOTTING OF ARTERIOVENOUS GRAFT Left 4/8/2022    Procedure: [id number];  Surgeon: Lindsey Louie MD;  Location: [facility] OR;  Service: Vascular;  Laterality: Left;    FISTULOGRAM N/A 2/27/2020    Procedure: Fistulogram;  Surgeon: Noe Benitez MD;  Location: [facility] CATH LAB/EP;  Service: Cardiology;  Laterality: N/A;    HYSTERECTOMY      JOINT REPLACEMENT  2001    total right knee     LUMBAR LAMINECTOMY      x 3, fusion x1    OOPHORECTOMY      PERIPHERAL ANGIOGRAPHY N/A 3/9/2020    Procedure: Peripheral angiography;  Surgeon: Jacinto Henriquez MD;  Location: [facility] CATH LAB/EP;  Service: Cardiology;  Laterality: N/A;    PLACEMENT OF ARTERIOVENOUS GRAFT Left 1/21/2020    Procedure: INSERTION, GRAFT, ARTERIOVENOUS;  Surgeon: Lindsey Louie MD;  Location: [facility] OR;  Service: General;  Laterality: Left;    PLACEMENT OF ARTERIOVENOUS GRAFT Right 7/22/2022    Procedure: INSERTION, GRAFT, ARTERIOVENOUS;  Surgeon: Lindsey Louie MD;  Location: [facility] OR;  Service: General;  Laterality: Right;    PLACEMENT OF DUAL-LUMEN VASCULAR CATHETER Right 4/16/2022    Procedure: INSERTION-CATHETER-RICKI;  Surgeon: Lindsey Louie MD;  Location: [facility];  Service: General;  Laterality: Right;    THROMBECTOMY Left 2/3/2020    Procedure: THROMBECTOMY;  Surgeon: Lindsey  ANGELA Louie MD;  Location: [facility] OR;  Service: General;  Laterality: Left;    THROMBECTOMY  3/9/2020    Procedure: Thrombectomy;  Surgeon: Jacinto Henriquez MD;  Location: [facility] CATH LAB/EP;  Service: Cardiology;;    THROMBECTOMY Left 4/7/2022    Procedure: THROMBECTOMY;  Surgeon: Lindsey Louie MD;  Location: [facility] OR;  Service: General;  Laterality: Left;       Review of patient's allergies indicates:   Allergen Reactions    Aspirin      Other reaction(s): hx of ulcers    Tetracycline Swelling     Other reaction(s): Swelling    Penicillins Rash     Other reaction(s): Hives  Other reaction(s): Rash  Other reaction(s): Rash  Other reaction(s): Hives       No current facility-administered medications on file prior to encounter.     Current Outpatient Medications on File Prior to Encounter   Medication Sig    acetaminophen (TYLENOL) 325 MG tablet Take 1 tablet (325 mg total) by mouth every 6 (six) hours as needed for Pain.    albuterol (VENTOLIN HFA) 90 mcg/actuation inhaler Inhale 2 puffs into the lungs every 6 (six) hours as needed for Wheezing or Shortness of Breath. Rescue    albuterol-ipratropium (DUO-NEB) 2.5 mg-0.5 mg/3 mL nebulizer solution Take 3 mLs by nebulization every 6 (six) hours as needed for Shortness of Breath. Rescue    allopurinoL (ZYLOPRIM) 100 MG tablet Take 1 tablet (100 mg total) by mouth on Tuesday, Thursday, Saturday, and Sunday.    amLODIPine (NORVASC) 5 MG tablet Take 5 mg by mouth once daily.    atorvastatin (LIPITOR) 80 MG tablet Take 80 mg by mouth once daily.    busPIRone (BUSPAR) 10 MG tablet Take 10 mg by mouth once daily.    ciprofloxacin HCl (CIPRO) 250 MG tablet Take 1 tablet by mouth every day for 7 days (Patient not taking: Reported on 10/4/2022)    ciprofloxacin HCl (CIPRO) 250 MG tablet Take 1 tablet by mouth every day for 7 days (Patient not taking: Reported on 10/4/2022)    clonazePAM (KLONOPIN) 1 MG tablet Take 1 mg by mouth 2 (two) times daily as needed  for Anxiety.    diclofenac sodium (VOLTAREN) 1 % Gel Apply 2 g topically 3 (three) times daily as needed (Apply to painful joints).    diphenhydrAMINE (BENADRYL) 25 mg capsule Take 25 mg by mouth every 6 (six) hours as needed for Itching.    DULoxetine (CYMBALTA) 30 MG capsule Take 1 capsule (30 mg total) by mouth once daily.    epoetin hemant-epbx (RETACRIT) 10,000 unit/mL imjection Inject 0.5 mLs (5,000 Units total) into the skin every Mon, Wed, Fri.    fentaNYL (SUBLIMAZE) 50 mcg/mL injection     fluticasone-umeclidin-vilanter (TRELEGY ELLIPTA) 200-62.5-25 mcg inhaler Inhale 1 puff into the lungs once daily.    heparin sodium,porcine (HEPARIN, PORCINE,) 1,000 unit/mL injection     heparin sodium,porcine (HEPARIN, PORCINE,) 10,000 unit/mL injection     HYDROcodone-acetaminophen (NORCO)  mg per tablet Take 1 tablet by mouth 3 (three) times daily as needed for Pain.    levoFLOXacin (LEVAQUIN) 750 MG tablet Take 750 mg by mouth once daily.    megestroL (MEGACE) 40 MG Tab Take 40 mg by mouth once daily.    mirtazapine (REMERON) 15 MG tablet Take 1 tablet (15 mg total) by mouth every evening.    ondansetron 4 mg/2 mL Soln     phenazopyridine (PYRIDIUM) 100 MG tablet Take 1 tablet by mouth for 1 dose    PHENYLephrine (ANJUM-SYNEPHRINE) 10 mg/mL injection     propofoL (DIPRIVAN) 10 mg/mL infusion     sevelamer carbonate (RENVELA) 800 mg Tab Take 1 tablet (800 mg total) by mouth after meals as needed.    vancomycin (VANCOCIN) 1,000 mg injection      Family History       Problem Relation (Age of Onset)    Arthritis Mother    Cancer Father    Cataracts Father, Sister    Diabetes Maternal Aunt    Glaucoma Cousin    Heart attack Maternal Grandfather    Heart disease Maternal Grandfather    Hypertension Father, Maternal Grandfather    Stroke Mother          Tobacco Use    Smoking status: Former     Types: Cigarettes    Smokeless tobacco: Former     Quit date: 2/3/2015   Substance and Sexual Activity     Alcohol use: Not Currently     Comment: Rare    Drug use: No    Sexual activity: Never     Partners: Male     Review of Systems   Constitutional:  Positive for chills, fatigue and fever.   HENT: Negative.     Respiratory:  Positive for cough, shortness of breath and wheezing.    Cardiovascular:  Positive for chest pain.   Gastrointestinal: Negative.    Genitourinary: Negative.    Musculoskeletal:  Positive for arthralgias and back pain.   Neurological:  Positive for dizziness.   Psychiatric/Behavioral: Negative.     Objective:     Vital Signs (Most Recent):  Temp: (!) 101 °F (38.3 °C) (10/11/22 2101)  Pulse: 94 (10/11/22 2018)  Resp: 18 (10/11/22 2018)  BP: (!) 145/78 (10/11/22 2018)  SpO2: 97 % (10/11/22 2018)   Vital Signs (24h Range):  Temp:  [98.1 °F (36.7 °C)-101 °F (38.3 °C)] 101 °F (38.3 °C)  Pulse:  [] 94  Resp:  [17-26] 18  SpO2:  [92 %-98 %] 97 %  BP: (112-145)/(52-80) 145/78     Weight: 40.8 kg (90 lb)  Body mass index is 16.46 kg/m².    Physical Exam  Constitutional:       Appearance: She is ill-appearing and diaphoretic.   HENT:      Head: Normocephalic and atraumatic.   Eyes:      Extraocular Movements: Extraocular movements intact.      Pupils: Pupils are equal, round, and reactive to light.   Cardiovascular:      Rate and Rhythm: Regular rhythm.      Pulses: Normal pulses.   Pulmonary:      Breath sounds: Wheezing and rhonchi present.   Abdominal:      General: Abdomen is flat.   Musculoskeletal:      Cervical back: Normal range of motion and neck supple.   Skin:     General: Skin is warm.   Neurological:      General: No focal deficit present.      Mental Status: Mental status is at baseline.   Psychiatric:         Mood and Affect: Mood normal.         Behavior: Behavior normal.         CRANIAL NERVES     CN III, IV, VI   Pupils are equal, round, and reactive to light.     Significant Labs: All pertinent labs within the past 24 hours have been reviewed.    Significant Imaging: I have  reviewed all pertinent imaging results/findings within the past 24 hours.    Assessment/Plan:     * Acute on chronic respiratory failure  Patient with Hypoxic Respiratory failure which is Acute on chronic.  she is on home oxygen at 2 LPM. Supplemental oxygen was provided and noted-  .   Signs/symptoms of respiratory failure include- wheezing. Contributing diagnoses includes - COPD Labs and images were reviewed.Will treat underlying causes and adjust management of respiratory failure as follows.    - patient's acute hypoxic respiratory failure likely secondary to influenza.  - Continue oxygen supplementation    Influenza A virus present  - will treat influenza with a five-day course of Tamiflu.  - Tylenol with codeine for cough.        ESRD (end stage renal disease) on dialysis  Nephrology was consulted        VTE Risk Mitigation (From admission, onward)         Ordered     heparin (porcine) injection 5,000 Units  Every 8 hours         10/11/22 2039     IP VTE HIGH RISK PATIENT  Once         10/11/22 2039     Place sequential compression device  Until discontinued         10/11/22 2039                   Bruce Wilder DO  Department of Hospital Medicine   [facility] - Telemetry

## 2022-10-12 NOTE — PROGRESS NOTES
10/11/22 2026   Admission   Initial VN Admission Questions Complete   Communication Issues? None   Shift   Virtual Nurse - Patient Verbalized Approval Of Camera Use   Safety/Activity   Safety Promotion/Fall Prevention instructed to call staff for mobility;side rails raised x 3;Fall Risk reviewed with patient/family   VIRTUAL NURSE: Admission questions completed with patient at this time. Safety precautions reviewed. Pt verbalized no complaints, no needs expressed. Instructed to use call light for assistance, she verbalized understanding. Awaiting orders.

## 2022-10-12 NOTE — ASSESSMENT & PLAN NOTE
Patient with Hypoxic Respiratory failure which is Acute on chronic.  she is on home oxygen at 2 LPM. Supplemental oxygen was provided and noted-  .   Signs/symptoms of respiratory failure include- wheezing. Contributing diagnoses includes - COPD Labs and images were reviewed.Will treat underlying causes and adjust management of respiratory failure as follows.    - patient's acute hypoxic respiratory failure likely secondary to influenza.  - Continue oxygen supplementation

## 2022-10-12 NOTE — AI DETERIORATION ALERT
Artificial Intelligence Notification  Joseline      Admit Date: 10/11/2022  LOS: 1  Code Status: Full Code   Date of Consult: 10/12/2022  : 1945  Age: 77 y.o.  Weight:   Wt Readings from Last 1 Encounters:   10/11/22 40.8 kg (90 lb)     Sex: female  [facility]   MRN: [id number]  Attending Physician: Andreia Salazar MD  Primary Service: Networked reference to record PCT   Time AI Alert Received: 5:05 PM  Time at Bedside: 5:00 PM (notified about hypotension by RN prior to AI alert)           Assessed patient at bedside. Lethargic. Answers some questions appropriately but requires constant stimulation. Difficult to elicit cap refill due to nail color. Good radial pulse. Initial BP 72/42. NS bolus started while at bedside. Repeat BP in 5 min with systolic in the 60's. Tachypnic. Saturating well on 3 L NC. 1 L IV fluid bolus running.  30 cc/kilos for this patient would be 1200 cc.  On chart review, no volume removed on hemodialysis.  Received amlodipine 5 mg this morning.  Blood cultures drawn. Empiric broad spectrum antibiotics - vancomycin, cefepime started.  Stat CBC, CMP, lactic acid ordered.  Transferred to ICU for close monitoring and possible pressor requirement.    Attempted calling patient's  and daughter by telephone with no response.  On subsequent reassessment, daughter at bedside.  Updated daughter on current clinical status.  Updated patient's  and son through daughter's telephone.       Vital Signs (Most Recent):  Temp: 97.9 °F (36.6 °C) (10/12/22 1658)  Pulse: 96 (10/12/22 1658)  Resp: (!) 25 (10/12/22 1658)  BP: (!) 72/42 (10/12/22 1658)  SpO2: 97 % (10/12/22 1658)   Vital Signs (24h Range):  Temp:  [96.8 °F (36 °C)-101 °F (38.3 °C)] 97.9 °F (36.6 °C)  Pulse:  [] 96  Resp:  [16-25] 25  SpO2:  [91 %-98 %] 97 %  BP: ()/(42-80) 72/42         This encounter was triggered by an Artificial Intelligence Notification.     Evaluation:  As noted above    Disposition:   Transfer to ICU    Andreia Salazar MD   Internal Medicine Hospitalist

## 2022-10-13 PROBLEM — I95.9 HYPOTENSION: Status: ACTIVE | Noted: 2022-10-13

## 2022-10-13 PROBLEM — J96.10 CHRONIC RESPIRATORY FAILURE: Status: ACTIVE | Noted: 2022-10-13

## 2022-10-13 PROBLEM — R64 PULMONARY CACHEXIA DUE TO COPD: Status: ACTIVE | Noted: 2019-12-18

## 2022-10-13 PROBLEM — J44.1 COPD EXACERBATION: Status: ACTIVE | Noted: 2022-10-13

## 2022-10-13 LAB
ALBUMIN SERPL BCP-MCNC: 2.6 G/DL (ref 3.5–5.2)
ALBUMIN SERPL BCP-MCNC: 2.6 G/DL (ref 3.5–5.2)
ALP SERPL-CCNC: 243 U/L (ref 55–135)
ALT SERPL W/O P-5'-P-CCNC: 27 U/L (ref 10–44)
ANION GAP SERPL CALC-SCNC: 6 MMOL/L (ref 8–16)
ANION GAP SERPL CALC-SCNC: 7 MMOL/L (ref 8–16)
AST SERPL-CCNC: 61 U/L (ref 10–40)
AV INDEX (PROSTH): 0.76
AV MEAN GRADIENT: 7 MMHG
AV PEAK GRADIENT: 13 MMHG
AV VALVE AREA: 2.38 CM2
AV VELOCITY RATIO: 0.76
BASOPHILS # BLD AUTO: 0.02 K/UL (ref 0–0.2)
BASOPHILS NFR BLD: 0.6 % (ref 0–1.9)
BILIRUB SERPL-MCNC: 0.4 MG/DL (ref 0.1–1)
BSA FOR ECHO PROCEDURE: 1.34 M2
BUN SERPL-MCNC: 21 MG/DL (ref 8–23)
BUN SERPL-MCNC: 21 MG/DL (ref 8–23)
CALCIUM SERPL-MCNC: 7.9 MG/DL (ref 8.7–10.5)
CALCIUM SERPL-MCNC: 7.9 MG/DL (ref 8.7–10.5)
CHLORIDE SERPL-SCNC: 103 MMOL/L (ref 95–110)
CHLORIDE SERPL-SCNC: 104 MMOL/L (ref 95–110)
CO2 SERPL-SCNC: 21 MMOL/L (ref 23–29)
CO2 SERPL-SCNC: 21 MMOL/L (ref 23–29)
CREAT SERPL-MCNC: 1.8 MG/DL (ref 0.5–1.4)
CREAT SERPL-MCNC: 1.8 MG/DL (ref 0.5–1.4)
CV ECHO LV RWT: 0.57 CM
DIFFERENTIAL METHOD: ABNORMAL
DOP CALC AO PEAK VEL: 1.77 M/S
DOP CALC AO VTI: 29.8 CM
DOP CALC LVOT AREA: 3.1 CM2
DOP CALC LVOT DIAMETER: 2 CM
DOP CALC LVOT PEAK VEL: 1.35 M/S
DOP CALC LVOT STROKE VOLUME: 70.96 CM3
DOP CALC MV VTI: 19.8 CM
DOP CALCLVOT PEAK VEL VTI: 22.6 CM
E WAVE DECELERATION TIME: 133.3 MSEC
E/A RATIO: 0.84
E/E' RATIO: 9.53 M/S
ECHO LV POSTERIOR WALL: 0.8 CM (ref 0.6–1.1)
EJECTION FRACTION: 75 %
EOSINOPHIL # BLD AUTO: 0.1 K/UL (ref 0–0.5)
EOSINOPHIL NFR BLD: 1.6 % (ref 0–8)
ERYTHROCYTE [DISTWIDTH] IN BLOOD BY AUTOMATED COUNT: 13.4 % (ref 11.5–14.5)
EST. GFR  (NO RACE VARIABLE): 28 ML/MIN/1.73 M^2
EST. GFR  (NO RACE VARIABLE): 28 ML/MIN/1.73 M^2
FRACTIONAL SHORTENING: 28 % (ref 28–44)
GLUCOSE SERPL-MCNC: 78 MG/DL (ref 70–110)
GLUCOSE SERPL-MCNC: 78 MG/DL (ref 70–110)
HCT VFR BLD AUTO: 35.3 % (ref 37–48.5)
HGB BLD-MCNC: 11.1 G/DL (ref 12–16)
IMM GRANULOCYTES # BLD AUTO: 0.06 K/UL (ref 0–0.04)
IMM GRANULOCYTES NFR BLD AUTO: 1.9 % (ref 0–0.5)
INTERVENTRICULAR SEPTUM: 0.75 CM (ref 0.6–1.1)
IVC DIAMETER: 1.97 CM
LA MAJOR: 4.58 CM
LA MINOR: 4.39 CM
LEFT ATRIUM SIZE: 2.47 CM
LEFT ATRIUM VOLUME INDEX MOD: 21.5 ML/M2
LEFT ATRIUM VOLUME MOD: 29.29 CM3
LEFT INTERNAL DIMENSION IN SYSTOLE: 2.03 CM (ref 2.1–4)
LEFT VENTRICLE DIASTOLIC VOLUME INDEX: 21.81 ML/M2
LEFT VENTRICLE DIASTOLIC VOLUME: 29.66 ML
LEFT VENTRICLE MASS INDEX: 37 G/M2
LEFT VENTRICLE SYSTOLIC VOLUME INDEX: 9.8 ML/M2
LEFT VENTRICLE SYSTOLIC VOLUME: 13.26 ML
LEFT VENTRICULAR INTERNAL DIMENSION IN DIASTOLE: 2.8 CM (ref 3.5–6)
LEFT VENTRICULAR MASS: 50.82 G
LV LATERAL E/E' RATIO: 10.13 M/S
LV SEPTAL E/E' RATIO: 9 M/S
LVOT MG: 4.77 MMHG
LVOT MV: 1.04 CM/S
LYMPHOCYTES # BLD AUTO: 0.8 K/UL (ref 1–4.8)
LYMPHOCYTES NFR BLD: 25.2 % (ref 18–48)
MCH RBC QN AUTO: 30.7 PG (ref 27–31)
MCHC RBC AUTO-ENTMCNC: 31.4 G/DL (ref 32–36)
MCV RBC AUTO: 98 FL (ref 82–98)
MONOCYTES # BLD AUTO: 0.4 K/UL (ref 0.3–1)
MONOCYTES NFR BLD: 13.2 % (ref 4–15)
MV PEAK A VEL: 0.96 M/S
MV PEAK E VEL: 0.81 M/S
MV PEAK GRADIENT: 5 MMHG
MV STENOSIS PRESSURE HALF TIME: 38.66 MS
MV VALVE AREA BY CONTINUITY EQUATION: 3.58 CM2
MV VALVE AREA P 1/2 METHOD: 5.69 CM2
NEUTROPHILS # BLD AUTO: 1.8 K/UL (ref 1.8–7.7)
NEUTROPHILS NFR BLD: 57.5 % (ref 38–73)
NRBC BLD-RTO: 0 /100 WBC
PHOSPHATE SERPL-MCNC: 2.9 MG/DL (ref 2.7–4.5)
PISA TR MAX VEL: 1.93 M/S
PLATELET # BLD AUTO: 120 K/UL (ref 150–450)
PMV BLD AUTO: 9.6 FL (ref 9.2–12.9)
POTASSIUM SERPL-SCNC: 3.9 MMOL/L (ref 3.5–5.1)
POTASSIUM SERPL-SCNC: 3.9 MMOL/L (ref 3.5–5.1)
PROT SERPL-MCNC: 5.9 G/DL (ref 6–8.4)
PULM VEIN S/D RATIO: 1.26
PV PEAK D VEL: 0.42 M/S
PV PEAK S VEL: 0.53 M/S
PV PEAK VELOCITY: 1 CM/S
RA MAJOR: 4.18 CM
RA PRESSURE: 3 MMHG
RA WIDTH: 2.45 CM
RBC # BLD AUTO: 3.61 M/UL (ref 4–5.4)
RIGHT VENTRICULAR END-DIASTOLIC DIMENSION: 1.97 CM
RV TISSUE DOPPLER FREE WALL SYSTOLIC VELOCITY 1 (APICAL 4 CHAMBER VIEW): 0.01 CM/S
SODIUM SERPL-SCNC: 130 MMOL/L (ref 136–145)
SODIUM SERPL-SCNC: 132 MMOL/L (ref 136–145)
TDI LATERAL: 0.08 M/S
TDI SEPTAL: 0.09 M/S
TDI: 0.09 M/S
TR MAX PG: 15 MMHG
TV REST PULMONARY ARTERY PRESSURE: 18 MMHG
VANCOMYCIN SERPL-MCNC: 8.4 UG/ML
WBC # BLD AUTO: 3.18 K/UL (ref 3.9–12.7)

## 2022-10-13 PROCEDURE — 80053 COMPREHEN METABOLIC PANEL: CPT | Performed by: INTERNAL MEDICINE

## 2022-10-13 PROCEDURE — 25000242 PHARM REV CODE 250 ALT 637 W/ HCPCS: Performed by: INTERNAL MEDICINE

## 2022-10-13 PROCEDURE — 63600175 PHARM REV CODE 636 W HCPCS: Performed by: INTERNAL MEDICINE

## 2022-10-13 PROCEDURE — 94761 N-INVAS EAR/PLS OXIMETRY MLT: CPT

## 2022-10-13 PROCEDURE — 25000003 PHARM REV CODE 250: Performed by: STUDENT IN AN ORGANIZED HEALTH CARE EDUCATION/TRAINING PROGRAM

## 2022-10-13 PROCEDURE — 27000207 HC ISOLATION

## 2022-10-13 PROCEDURE — 25000003 PHARM REV CODE 250: Performed by: NURSE PRACTITIONER

## 2022-10-13 PROCEDURE — 25000003 PHARM REV CODE 250: Performed by: INTERNAL MEDICINE

## 2022-10-13 PROCEDURE — 80069 RENAL FUNCTION PANEL: CPT | Performed by: STUDENT IN AN ORGANIZED HEALTH CARE EDUCATION/TRAINING PROGRAM

## 2022-10-13 PROCEDURE — 36415 COLL VENOUS BLD VENIPUNCTURE: CPT | Performed by: INTERNAL MEDICINE

## 2022-10-13 PROCEDURE — 85025 COMPLETE CBC W/AUTO DIFF WBC: CPT | Performed by: INTERNAL MEDICINE

## 2022-10-13 PROCEDURE — 63600175 PHARM REV CODE 636 W HCPCS: Performed by: STUDENT IN AN ORGANIZED HEALTH CARE EDUCATION/TRAINING PROGRAM

## 2022-10-13 PROCEDURE — 36415 COLL VENOUS BLD VENIPUNCTURE: CPT | Performed by: STUDENT IN AN ORGANIZED HEALTH CARE EDUCATION/TRAINING PROGRAM

## 2022-10-13 PROCEDURE — 99900035 HC TECH TIME PER 15 MIN (STAT)

## 2022-10-13 PROCEDURE — 80100014 HC HEMODIALYSIS 1:1

## 2022-10-13 PROCEDURE — 11000001 HC ACUTE MED/SURG PRIVATE ROOM

## 2022-10-13 PROCEDURE — 94640 AIRWAY INHALATION TREATMENT: CPT

## 2022-10-13 PROCEDURE — 27000221 HC OXYGEN, UP TO 24 HOURS

## 2022-10-13 PROCEDURE — 80202 ASSAY OF VANCOMYCIN: CPT | Performed by: STUDENT IN AN ORGANIZED HEALTH CARE EDUCATION/TRAINING PROGRAM

## 2022-10-13 PROCEDURE — 63600175 PHARM REV CODE 636 W HCPCS: Performed by: NURSE PRACTITIONER

## 2022-10-13 RX ORDER — OSELTAMIVIR PHOSPHATE 30 MG/1
30 CAPSULE ORAL
Status: COMPLETED | OUTPATIENT
Start: 2022-10-14 | End: 2022-10-19

## 2022-10-13 RX ORDER — ACETAMINOPHEN 325 MG/1
650 TABLET ORAL EVERY 6 HOURS PRN
Status: DISCONTINUED | OUTPATIENT
Start: 2022-10-13 | End: 2022-10-20 | Stop reason: HOSPADM

## 2022-10-13 RX ADMIN — DULOXETINE 30 MG: 30 CAPSULE, DELAYED RELEASE ORAL at 08:10

## 2022-10-13 RX ADMIN — PROMETHAZINE HYDROCHLORIDE 6.25 MG: 25 INJECTION INTRAMUSCULAR; INTRAVENOUS at 09:10

## 2022-10-13 RX ADMIN — IPRATROPIUM BROMIDE AND ALBUTEROL SULFATE 3 ML: 2.5; .5 SOLUTION RESPIRATORY (INHALATION) at 07:10

## 2022-10-13 RX ADMIN — HEPARIN SODIUM 5000 UNITS: 5000 INJECTION INTRAVENOUS; SUBCUTANEOUS at 01:10

## 2022-10-13 RX ADMIN — MUPIROCIN: 20 OINTMENT TOPICAL at 09:10

## 2022-10-13 RX ADMIN — SEVELAMER CARBONATE 800 MG: 800 TABLET, FILM COATED ORAL at 08:10

## 2022-10-13 RX ADMIN — ACETAMINOPHEN AND CODEINE PHOSPHATE 5 ML: 120; 12 SOLUTION ORAL at 04:10

## 2022-10-13 RX ADMIN — IPRATROPIUM BROMIDE AND ALBUTEROL SULFATE 3 ML: 2.5; .5 SOLUTION RESPIRATORY (INHALATION) at 08:10

## 2022-10-13 RX ADMIN — CEFEPIME 1 G: 1 INJECTION, POWDER, FOR SOLUTION INTRAMUSCULAR; INTRAVENOUS at 06:10

## 2022-10-13 RX ADMIN — ONDANSETRON 4 MG: 2 INJECTION INTRAMUSCULAR; INTRAVENOUS at 06:10

## 2022-10-13 RX ADMIN — HEPARIN SODIUM 5000 UNITS: 5000 INJECTION INTRAVENOUS; SUBCUTANEOUS at 09:10

## 2022-10-13 RX ADMIN — ACETAMINOPHEN 650 MG: 325 TABLET ORAL at 02:10

## 2022-10-13 RX ADMIN — MIRTAZAPINE 15 MG: 7.5 TABLET ORAL at 09:10

## 2022-10-13 RX ADMIN — IPRATROPIUM BROMIDE AND ALBUTEROL SULFATE 3 ML: 2.5; .5 SOLUTION RESPIRATORY (INHALATION) at 01:10

## 2022-10-13 RX ADMIN — SENNOSIDES AND DOCUSATE SODIUM 1 TABLET: 50; 8.6 TABLET ORAL at 08:10

## 2022-10-13 RX ADMIN — MEGESTROL ACETATE 40 MG: 40 TABLET ORAL at 10:10

## 2022-10-13 RX ADMIN — VANCOMYCIN HYDROCHLORIDE 500 MG: 500 INJECTION, POWDER, LYOPHILIZED, FOR SOLUTION INTRAVENOUS at 09:10

## 2022-10-13 RX ADMIN — HEPARIN SODIUM 5000 UNITS: 5000 INJECTION INTRAVENOUS; SUBCUTANEOUS at 05:10

## 2022-10-13 RX ADMIN — SEVELAMER CARBONATE 800 MG: 800 TABLET, FILM COATED ORAL at 06:10

## 2022-10-13 RX ADMIN — ACETAMINOPHEN 650 MG: 325 TABLET ORAL at 09:10

## 2022-10-13 RX ADMIN — OSELTAMIVIR PHOSPHATE 30 MG: 30 CAPSULE ORAL at 08:10

## 2022-10-13 RX ADMIN — MUPIROCIN: 20 OINTMENT TOPICAL at 08:10

## 2022-10-13 RX ADMIN — BUSPIRONE HYDROCHLORIDE 10 MG: 5 TABLET ORAL at 08:10

## 2022-10-13 RX ADMIN — ATORVASTATIN CALCIUM 80 MG: 40 TABLET, FILM COATED ORAL at 08:10

## 2022-10-13 NOTE — NURSING
Pt trf to floor, 453 at 1745 via stretcher on tele with transport.  All personal belongings sent with patient including, phone  and phone.  Pts daughter informed of the transfer.  Radha SANCHEZ called, all questions answered.

## 2022-10-13 NOTE — EICU
Rounding (Video Assessment):  No    Intervention Initiated From:  Bedside    Francisco Communicated with Bedside Nurse regarding:  BP    Nurse Notified:  Yes    Doctor Notified:  Yes    Comments: Bedside nurse called to request consult from Dr. Michelle due to hypotension. Dr. Michelle notified

## 2022-10-13 NOTE — PLAN OF CARE
Problem: Adult Inpatient Plan of Care  Goal: Plan of Care Review  Outcome: Ongoing, Progressing  Goal: Patient-Specific Goal (Individualized)  Outcome: Ongoing, Progressing  Goal: Absence of Hospital-Acquired Illness or Injury  Outcome: Ongoing, Progressing  Goal: Optimal Comfort and Wellbeing  Outcome: Ongoing, Progressing  Goal: Readiness for Transition of Care  Outcome: Ongoing, Progressing     Problem: COPD (Chronic Obstructive Pulmonary Disease) Comorbidity  Goal: Maintenance of COPD Symptom Control  Outcome: Ongoing, Progressing     Problem: Infection  Goal: Absence of Infection Signs and Symptoms  Outcome: Ongoing, Progressing     Problem: Gas Exchange Impaired  Goal: Optimal Gas Exchange  Outcome: Ongoing, Progressing

## 2022-10-13 NOTE — SUBJECTIVE & OBJECTIVE
Interval History:  Became hypotensive and lethargic yesterday for which she was transferred to the ICU.  Received 1 L IV fluid bolus which transiently improved pressure and dropped again for which she was placed on Levophed for a few hours.  Stopped around 1-2 a.m..  This morning she reports feeling better.  Does not remember much of what happened yesterday but knows she is in the ICU now.    Review of Systems   Constitutional:  Positive for activity change, appetite change and fatigue.   Eyes:  Negative for visual disturbance.   Respiratory:  Positive for cough and shortness of breath.    Cardiovascular:  Negative for chest pain and leg swelling.   Gastrointestinal:  Negative for abdominal pain, diarrhea, nausea and vomiting.   Genitourinary:  Negative for dysuria, frequency and urgency.   Musculoskeletal:  Negative for back pain.   Neurological:  Negative for light-headedness and headaches.   Psychiatric/Behavioral:  Negative for confusion.    Objective:     Vital Signs (Most Recent):  Temp: 98.8 °F (37.1 °C) (10/13/22 0730)  Pulse: 106 (10/13/22 1000)  Resp: (!) 28 (10/13/22 1000)  BP: (!) 106/55 (10/13/22 1000)  SpO2: 98 % (10/13/22 1000)   Vital Signs (24h Range):  Temp:  [97.6 °F (36.4 °C)-98.8 °F (37.1 °C)] 98.8 °F (37.1 °C)  Pulse:  [] 106  Resp:  [16-39] 28  SpO2:  [87 %-100 %] 98 %  BP: ()/(42-72) 106/55     Weight: 40.8 kg (90 lb)  Body mass index is 16.46 kg/m².    Intake/Output Summary (Last 24 hours) at 10/13/2022 1155  Last data filed at 10/13/2022 0627  Gross per 24 hour   Intake 2577.59 ml   Output 700 ml   Net 1877.59 ml        Physical Exam  Vitals and nursing note reviewed.   Constitutional:       General: She is not in acute distress.     Appearance: She is ill-appearing.      Comments: More awake today  Diffuse muscle wasting noted   HENT:      Head: Normocephalic and atraumatic.      Mouth/Throat:      Mouth: Mucous membranes are dry.   Eyes:      General: No scleral  icterus.  Cardiovascular:      Rate and Rhythm: Normal rate and regular rhythm.      Pulses: Normal pulses.      Heart sounds: Normal heart sounds. No murmur heard.  Pulmonary:      Effort: Pulmonary effort is normal. No respiratory distress.      Breath sounds: Wheezing present.   Abdominal:      Palpations: Abdomen is soft.      Tenderness: There is no abdominal tenderness.   Musculoskeletal:      Cervical back: Neck supple.      Right lower leg: No edema.      Left lower leg: No edema.   Skin:     General: Skin is warm and dry.      Findings: No bruising or rash.   Neurological:      Mental Status: She is oriented to person, place, and time.      Comments: Moves all extremities voluntarily   Psychiatric:         Mood and Affect: Mood normal.       Significant Labs: All pertinent labs within the past 24 hours have been reviewed.    Significant Imaging: I have reviewed all pertinent imaging results/findings within the past 24 hours.

## 2022-10-13 NOTE — HPI
77 yr old female patient with Pmhx of ESRD on HD MWF, CHF, COPD, presented to ED for SOB. Patient was septic with hypotension and hypoxia. Patient is on 3L at home but had saturations in the low 80's on arrival to ED. Patient was started on Levophed for low BP and admitted to ICU.

## 2022-10-13 NOTE — PLAN OF CARE
The sw spoke to the pt's dtr Chintan Stack [phone] via phone to complete the assessment b/c her spouse Dre Quevedo [phone] is home also with the flu. The couple live in [place]. The pt's independent with her adl's and uses o2,rollator,power chair,rw,bsc and a nebulizer at home. The pt is a dialysis pt at [facility] at noon under the care of Dr. Aura Diggs. The pt doesn't drive so her spouse transports her to hd,errands and dr sewell's. Chintan will transport the pt home at d/c and they're requesting hh for the pt at d/c. The sw left her name and contact info with Chintan and the pt's spouse and encouraged them to call if they have any further questions or concerns. The sw will continue to follow the pt throughout her transitions of care and will assist with any d/c needs.        10/13/22 7637   Discharge Assessment   Assessment Type Discharge Planning Assessment   Confirmed/corrected address, phone number and insurance Yes   Confirmed Demographics Correct on Facesheet   Source of Information family   If unable to respond/provide information was family/caregiver contacted? Yes   Contact Name/Number Chintan Stack(dtr)[phone]   When was your last doctors appointment? 10/07/22   Communicated LAITH with patient/caregiver Date not available/Unable to determine   Reason For Admission Acute of chronic respiratory failure   Lives With spouse   Do you expect to return to your current living situation? Other (see comments)  (TBD)   Do you have help at home or someone to help you manage your care at home? Yes   Who are your caregiver(s) and their phone number(s)? Dre Quevedo(spouse)[phone]   Prior to hospitilization cognitive status: Alert/Oriented   Current cognitive status:   (pt's very groggy)   Walking or Climbing Stairs Difficulty ambulation difficulty, requires equipment   Dressing/Bathing Difficulty none   Home Accessibility wheelchair accessible   Home Layout Able to live on 1st floor   Equipment  Currently Used at Home oxygen;rollator;power chair;walker, rolling;bedside commode;nebulizer   Readmission within 30 days? No   Patient currently being followed by outpatient case management? No   Do you currently have service(s) that help you manage your care at home? No   Do you take prescription medications? Yes   Do you have prescription coverage? Yes   Coverage [employer] Managed Medicare/ Supplement   Do you have any problems affording any of your prescribed medications? No  (the pt receives her meds affordably at [facility] in [place])   Is the patient taking medications as prescribed? yes   Who is going to help you get home at discharge? Chintan Stack(dtr)[phone]   How do you get to doctors appointments? family or friend will provide   Are you on dialysis? Yes   Dialysis Name and Scheduled days MWF at [facility] at noon under the care of Dr. Aura Diggs   Do you take coumadin? No   Discharge Plan A Home Health   Discharge Plan B Other  (TBD)   DME Needed Upon Discharge  other (see comments)  (TBD)   Discharge Plan discussed with: Adult children   Discharge Barriers Identified None   Relationship/Environment   Name(s) of Who Lives With Patient Dre Quevedo(spouse)[phone]

## 2022-10-13 NOTE — PLAN OF CARE
Problem: Adult Inpatient Plan of Care  Goal: Plan of Care Review  Outcome: Ongoing, Progressing     Problem: COPD (Chronic Obstructive Pulmonary Disease) Comorbidity  Goal: Maintenance of COPD Symptom Control  Outcome: Ongoing, Progressing     Problem: Infection  Goal: Absence of Infection Signs and Symptoms  Outcome: Ongoing, Progressing     Problem: Gas Exchange Impaired  Goal: Optimal Gas Exchange  Outcome: Ongoing, Progressing     Problem: Fall Injury Risk  Goal: Absence of Fall and Fall-Related Injury  Outcome: Ongoing, Progressing     Problem: Device-Related Complication Risk (Hemodialysis)  Goal: Safe, Effective Therapy Delivery  Outcome: Ongoing, Progressing     Problem: Skin Injury Risk Increased  Goal: Skin Health and Integrity  Outcome: Ongoing, Progressing

## 2022-10-13 NOTE — PROCEDURES
"Patient seen and examined on HD tolerating well.    BP (!) 106/55 (BP Location: Right arm, Patient Position: Lying)   Pulse 106   Temp 98.8 °F (37.1 °C)   Resp (!) 28   Ht 5' 2" (1.575 m)   Wt 40.8 kg (90 lb)   LMP  (LMP Unknown)   SpO2 98%   BMI 16.46 kg/m²     With any question please call answering service [phone]  Ramo Da Silva MD    [facility]  EDGARD Bustillos MD, FACNEIL ECHEVARRIA MD,   MD CURTIS Benoit MD E. V. Harmon, NP    [id number]  [place], [id number]   "

## 2022-10-13 NOTE — SUBJECTIVE & OBJECTIVE
Past Medical History:   Diagnosis Date    Acute congestive heart failure 02/10/2020    Anemia     Bilateral renal cysts     Cataract     Chronic LBP 7/26/2012    Chronic pain     CKD (chronic kidney disease), stage IV     Colon polyp 2013    COPD (chronic obstructive pulmonary disease)     Dehydration     Encounter for blood transfusion     HTN (hypertension)     Lumbar spondylosis     Melanoma     of the lip    Metabolic bone disease     Migraines, neuralgic     Osteoporosis     Primary osteoarthritis of both knees     s/p Rt TKA    Pulmonary embolism with infarction     Seizures 1972    x1 only    Subdeltoid bursitis, L>R. 9/27/2012    Ulcer     Vitamin D deficiency disease        Past Surgical History:   Procedure Laterality Date    BLADDER SUSPENSION      CATARACT EXTRACTION  11/18/13    left eye    CERVICAL LAMINECTOMY      x3, fusion x1    COLONOSCOPY  2009    DECLOTTING OF ARTERIOVENOUS GRAFT Left 1/3/2022    Procedure: [id number];  Surgeon: Lindsey Louie MD;  Location: [facility] OR;  Service: Vascular;  Laterality: Left;    DECLOTTING OF ARTERIOVENOUS GRAFT Left 2/8/2022    Procedure: [id number];  Surgeon: Lindsey Louie MD;  Location: [facility] OR;  Service: Vascular;  Laterality: Left;    DECLOTTING OF ARTERIOVENOUS GRAFT Left 4/8/2022    Procedure: [id number];  Surgeon: Lindsey Louie MD;  Location: [facility] OR;  Service: Vascular;  Laterality: Left;    FISTULOGRAM N/A 2/27/2020    Procedure: Fistulogram;  Surgeon: Noe Benitez MD;  Location: [facility] CATH LAB/EP;  Service: Cardiology;  Laterality: N/A;    HYSTERECTOMY      JOINT REPLACEMENT  2001    total right knee     LUMBAR LAMINECTOMY      x 3, fusion x1    OOPHORECTOMY      PERIPHERAL ANGIOGRAPHY N/A 3/9/2020    Procedure: Peripheral angiography;  Surgeon: Jacinto Henriquez MD;  Location: [facility] CATH LAB/EP;  Service: Cardiology;  Laterality: N/A;    PLACEMENT OF ARTERIOVENOUS GRAFT  Left 1/21/2020    Procedure: INSERTION, GRAFT, ARTERIOVENOUS;  Surgeon: Lindsey Louie MD;  Location: [facility] OR;  Service: General;  Laterality: Left;    PLACEMENT OF ARTERIOVENOUS GRAFT Right 7/22/2022    Procedure: INSERTION, GRAFT, ARTERIOVENOUS;  Surgeon: Lindsey Louie MD;  Location: [facility] OR;  Service: General;  Laterality: Right;    PLACEMENT OF DUAL-LUMEN VASCULAR CATHETER Right 4/16/2022    Procedure: INSERTION-CATHETER-RICKI;  Surgeon: Lindsey Louie MD;  Location: [facility] OR;  Service: General;  Laterality: Right;    THROMBECTOMY Left 2/3/2020    Procedure: THROMBECTOMY;  Surgeon: Lindsey Louie MD;  Location: [facility] OR;  Service: General;  Laterality: Left;    THROMBECTOMY  3/9/2020    Procedure: Thrombectomy;  Surgeon: Jacinto Henriquez MD;  Location: [facility] CATH LAB/EP;  Service: Cardiology;;    THROMBECTOMY Left 4/7/2022    Procedure: THROMBECTOMY;  Surgeon: Lindsey Louie MD;  Location: [facility] OR;  Service: General;  Laterality: Left;       Review of patient's allergies indicates:   Allergen Reactions    Aspirin      Other reaction(s): hx of ulcers    Tetracycline Swelling     Other reaction(s): Swelling    Penicillins Rash     Other reaction(s): Hives  Other reaction(s): Rash  Other reaction(s): Rash  Other reaction(s): Hives       Family History       Problem Relation (Age of Onset)    Arthritis Mother    Cancer Father    Cataracts Father, Sister    Diabetes Maternal Aunt    Glaucoma Cousin    Heart attack Maternal Grandfather    Heart disease Maternal Grandfather    Hypertension Father, Maternal Grandfather    Stroke Mother          Tobacco Use    Smoking status: Former     Types: Cigarettes    Smokeless tobacco: Former     Quit date: 2/3/2015   Substance and Sexual Activity    Alcohol use: Not Currently     Comment: Rare    Drug use: No    Sexual activity: Never     Partners: Male         Review of Systems  Objective:     Vital Signs (Most Recent):  Temp: 98.8 °F  "(37.1 °C) (10/13/22 0730)  Pulse: 94 (10/13/22 0830)  Resp: (!) 29 (10/13/22 0830)  BP: (!) 123/59 (10/13/22 0830)  SpO2: 97 % (10/13/22 0835)   Vital Signs (24h Range):  Temp:  [97.6 °F (36.4 °C)-98.8 °F (37.1 °C)] 98.8 °F (37.1 °C)  Pulse:  [] 94  Resp:  [16-39] 29  SpO2:  [87 %-100 %] 97 %  BP: ()/(42-72) 123/59     Weight: 40.8 kg (90 lb)  Body mass index is 16.46 kg/m².      Intake/Output Summary (Last 24 hours) at 10/13/2022 0858  Last data filed at 10/13/2022 0627  Gross per 24 hour   Intake 2577.59 ml   Output 700 ml   Net 1877.59 ml       Physical Exam    Vents:       Lines/Drains/Airways       Central Venous Catheter Line  Duration                  Hemodialysis AV Graft Left upper arm -- days              Peripheral Intravenous Line  Duration                  Peripheral IV - Single Lumen 10/11/22 1247 20 G Right Antecubital 1 day                    Significant Labs:    CBC/Anemia Profile:  Recent Labs   Lab 10/12/22  0641 10/12/22  1720 10/13/22  0454   WBC 3.76* 3.45* 3.18*   HGB 12.7 11.6* 11.1*   HCT 41.7 36.6* 35.3*    121* 120*   * 99* 98   RDW 13.5 13.4 13.4        Chemistries:  Recent Labs   Lab 10/12/22  0641 10/12/22  1720 10/13/22  0454   * 134* 130*  132*   K 4.7 3.9 3.9  3.9    101 103  104   CO2 20* 23 21*  21*   BUN 23 15 21  21   CREATININE 2.1* 1.8* 1.8*  1.8*   CALCIUM 9.0 8.1* 7.9*  7.9*   ALBUMIN 3.1* 2.7* 2.6*  2.6*   PROT 7.3 6.0 5.9*   BILITOT 0.5 0.4 0.4   ALKPHOS 305* 258* 243*   ALT 21 30 27   AST 43* 60* 61*   PHOS  --   --  2.9       {Results:05986}    Significant Imaging:   {Imaging Review:08976::"I have reviewed all pertinent imaging results/findings within the past 24 hours."}  "

## 2022-10-13 NOTE — ASSESSMENT & PLAN NOTE
Likely secondary to influenza infection.  Vaccinated for influenza about 4 months ago.  Possible secondary bacterial infection given hypotension   Tamiflu to complete 5 day.  Broad-spectrum antibiotics for now  Continue scheduled inhalers    Reported to be on 2 L oxygen at baseline for COPD.  Patient states she normally uses 3 L at baseline.  Consider adding steroids for presumed COPD exacerbation, if symptoms do not improve with antiviral and symptomatic management.

## 2022-10-13 NOTE — PROGRESS NOTES
[facility] Medicine  Progress Note    Patient Name: Katie Quevedo  MRN: [id number]  Patient Class: IP- Inpatient   Admission Date: 10/11/2022  Length of Stay: 2 days  Attending Physician: Andreia Salazar MD  Primary Care Provider: Kingston Verduzco MD        Subjective:     Principal Problem:Acute on chronic respiratory failure      HPI:  Katie is a 77 y.o. female with past medical history of CHF, COPD, ESRD, hypertension, migraines, PE who presents with complaint of increased shortness of breath, increased cough compared to usual and fever for the last 2 days.  She states that her  recently was diagnosed with the flu.  She is normally on 3 L of oxygen via nasal cannula at home.  Her pulse ox was reading in the 80s today.  She did breathing treatments this morning which she thought maybe helped a little bit.  In the emergency department she did test positive for influenza.  She is also febrile with a T-max of 101°.  She is admitted for acute on chronic respiratory failure in the setting of influenza infection.      Overview/Hospital Course:  No notes on file    Interval History:  Became hypotensive and lethargic yesterday for which she was transferred to the ICU.  Received 1 L IV fluid bolus which transiently improved pressure and dropped again for which she was placed on Levophed for a few hours.  Stopped around 1-2 a.m..  This morning she reports feeling better.  Does not remember much of what happened yesterday but knows she is in the ICU now.    Review of Systems   Constitutional:  Positive for activity change, appetite change and fatigue.   Eyes:  Negative for visual disturbance.   Respiratory:  Positive for cough and shortness of breath.    Cardiovascular:  Negative for chest pain and leg swelling.   Gastrointestinal:  Negative for abdominal pain, diarrhea, nausea and vomiting.   Genitourinary:  Negative for dysuria, frequency and urgency.   Musculoskeletal:  Negative for back pain.    Neurological:  Negative for light-headedness and headaches.   Psychiatric/Behavioral:  Negative for confusion.    Objective:     Vital Signs (Most Recent):  Temp: 98.8 °F (37.1 °C) (10/13/22 0730)  Pulse: 106 (10/13/22 1000)  Resp: (!) 28 (10/13/22 1000)  BP: (!) 106/55 (10/13/22 1000)  SpO2: 98 % (10/13/22 1000)   Vital Signs (24h Range):  Temp:  [97.6 °F (36.4 °C)-98.8 °F (37.1 °C)] 98.8 °F (37.1 °C)  Pulse:  [] 106  Resp:  [16-39] 28  SpO2:  [87 %-100 %] 98 %  BP: ()/(42-72) 106/55     Weight: 40.8 kg (90 lb)  Body mass index is 16.46 kg/m².    Intake/Output Summary (Last 24 hours) at 10/13/2022 1155  Last data filed at 10/13/2022 0627  Gross per 24 hour   Intake 2577.59 ml   Output 700 ml   Net 1877.59 ml        Physical Exam  Vitals and nursing note reviewed.   Constitutional:       General: She is not in acute distress.     Appearance: She is ill-appearing.      Comments: More awake today  Diffuse muscle wasting noted   HENT:      Head: Normocephalic and atraumatic.      Mouth/Throat:      Mouth: Mucous membranes are dry.   Eyes:      General: No scleral icterus.  Cardiovascular:      Rate and Rhythm: Normal rate and regular rhythm.      Pulses: Normal pulses.      Heart sounds: Normal heart sounds. No murmur heard.  Pulmonary:      Effort: Pulmonary effort is normal. No respiratory distress.      Breath sounds: Wheezing present.   Abdominal:      Palpations: Abdomen is soft.      Tenderness: There is no abdominal tenderness.   Musculoskeletal:      Cervical back: Neck supple.      Right lower leg: No edema.      Left lower leg: No edema.   Skin:     General: Skin is warm and dry.      Findings: No bruising or rash.   Neurological:      Mental Status: She is oriented to person, place, and time.      Comments: Moves all extremities voluntarily   Psychiatric:         Mood and Affect: Mood normal.       Significant Labs: All pertinent labs within the past 24 hours have been reviewed.    Significant  Imaging: I have reviewed all pertinent imaging results/findings within the past 24 hours.      Assessment/Plan:      * Acute on chronic respiratory failure  Likely secondary to influenza infection.  Vaccinated for influenza about 4 months ago.  Possible secondary bacterial infection given hypotension   Tamiflu to complete 5 day.  Broad-spectrum antibiotics for now  Continue scheduled inhalers    Reported to be on 2 L oxygen at baseline for COPD.  Patient states she normally uses 3 L at baseline.  Consider adding steroids for presumed COPD exacerbation, if symptoms do not improve with antiviral and symptomatic management.     Hypotension  Hypotension with systolics in the 60s noted on 10/12  Transiently responded to 1 L IV fluid bolus and was placed on Levophed for a few hours, now off pressors     Blood cultures drawn and pending.  Suspect infectious/sepsis picture.  Continue broad-spectrum antibiotics cefepime and vancomycin day 2.  Add echocardiogram.      Chronic respiratory failure  On 3 L oxygen at baseline for COPD      Influenza A virus present  will treat influenza with a five-day course of Tamiflu.          ESRD (end stage renal disease) on dialysis  Follows with Dr. Diggs. Nephrology consulted        VTE Risk Mitigation (From admission, onward)         Ordered     heparin (porcine) injection 4,100 Units  As needed (PRN)         10/12/22 1215     heparin (porcine) injection 5,000 Units  Every 8 hours         10/11/22 2039     IP VTE HIGH RISK PATIENT  Once         10/11/22 2039     Place sequential compression device  Until discontinued         10/11/22 2039                Discharge Planning   LAITH:      Code Status: Full Code   Is the patient medically ready for discharge?:     Reason for patient still in hospital (select all that apply): Patient new problem and Patient trending condition  Discharge Plan A: Home, Home with family, Home Health      Discussed with patient's daughter Chintan at bedside on 10/12  regarding code status when patient was lethargic.  States she would have to speak with her father, patient's  who currently has influenza and was recently discharged from the hospital.  Encouraged to have code status discussions.     Transfer out of the ICU    Critical care time spent on the evaluation and treatment of severe organ dysfunction, review of pertinent labs and imaging studies, discussions with consulting providers and discussions with patient/family: 45 minutes.      Andreia Salazar MD  Department of Hospital Medicine   [place] - Intensive Care

## 2022-10-13 NOTE — NURSING
EICU doctor notified of pt's B/P dropping. PRN Bolus was initiated. And the doctor will order a Pressor if boluses are not effective.

## 2022-10-13 NOTE — ASSESSMENT & PLAN NOTE
Hypotension with systolics in the 60s noted on 10/12  Transiently responded to 1 L IV fluid bolus and was placed on Levophed for a few hours, now off pressors     Blood cultures drawn and pending.  Suspect infectious/sepsis picture.  Continue broad-spectrum antibiotics cefepime and vancomycin day 2.  Add echocardiogram.

## 2022-10-13 NOTE — PROGRESS NOTES
Pharmacist Renal Dose Adjustment Note    Katie Quevedo is a 77 y.o. female being treated with the medication oseltamivir    Patient Data:    Vital Signs (Most Recent):  Temp: 98.8 °F (37.1 °C) (10/13/22 0730)  Pulse: 94 (10/13/22 0830)  Resp: (!) 29 (10/13/22 0830)  BP: (!) 123/59 (10/13/22 0830)  SpO2: 97 % (10/13/22 0835)   Vital Signs (72h Range):  Temp:  [96.8 °F (36 °C)-101 °F (38.3 °C)]   Pulse:  []   Resp:  [16-39]   BP: ()/(42-80)   SpO2:  [87 %-100 %]      Recent Labs   Lab 10/12/22  0641 10/12/22  1720 10/13/22  0454   CREATININE 2.1* 1.8* 1.8*  1.8*     Serum creatinine: 1.8 mg/dL (H) 10/13/22 0454  Estimated creatinine clearance: 16.3 mL/min (A)    Medication:Oseltamivir dose: 30 mg frequency daily will be changed to medication:oseltamivir dose:30 mg frequency:post HD    Pharmacist's Name: Lissette Tejeda  Pharmacist's Extension: [phone]

## 2022-10-14 PROBLEM — R11.0 NAUSEA: Status: ACTIVE | Noted: 2020-01-14

## 2022-10-14 LAB
ALBUMIN SERPL BCP-MCNC: 2.8 G/DL (ref 3.5–5.2)
ALP SERPL-CCNC: 215 U/L (ref 55–135)
ALT SERPL W/O P-5'-P-CCNC: 23 U/L (ref 10–44)
ANION GAP SERPL CALC-SCNC: 13 MMOL/L (ref 8–16)
AST SERPL-CCNC: 68 U/L (ref 10–40)
BASOPHILS # BLD AUTO: 0.02 K/UL (ref 0–0.2)
BASOPHILS NFR BLD: 0.6 % (ref 0–1.9)
BILIRUB SERPL-MCNC: 0.5 MG/DL (ref 0.1–1)
BUN SERPL-MCNC: 17 MG/DL (ref 8–23)
CALCIUM SERPL-MCNC: 8.5 MG/DL (ref 8.7–10.5)
CHLORIDE SERPL-SCNC: 99 MMOL/L (ref 95–110)
CO2 SERPL-SCNC: 24 MMOL/L (ref 23–29)
CREAT SERPL-MCNC: 1.7 MG/DL (ref 0.5–1.4)
DIFFERENTIAL METHOD: ABNORMAL
EOSINOPHIL # BLD AUTO: 0.1 K/UL (ref 0–0.5)
EOSINOPHIL NFR BLD: 4.3 % (ref 0–8)
ERYTHROCYTE [DISTWIDTH] IN BLOOD BY AUTOMATED COUNT: 13.4 % (ref 11.5–14.5)
EST. GFR  (NO RACE VARIABLE): 30 ML/MIN/1.73 M^2
GLUCOSE SERPL-MCNC: 74 MG/DL (ref 70–110)
HCT VFR BLD AUTO: 40.3 % (ref 37–48.5)
HGB BLD-MCNC: 12.9 G/DL (ref 12–16)
IMM GRANULOCYTES # BLD AUTO: 0.02 K/UL (ref 0–0.04)
IMM GRANULOCYTES NFR BLD AUTO: 0.6 % (ref 0–0.5)
LYMPHOCYTES # BLD AUTO: 1 K/UL (ref 1–4.8)
LYMPHOCYTES NFR BLD: 31.7 % (ref 18–48)
MCH RBC QN AUTO: 31 PG (ref 27–31)
MCHC RBC AUTO-ENTMCNC: 32 G/DL (ref 32–36)
MCV RBC AUTO: 97 FL (ref 82–98)
MONOCYTES # BLD AUTO: 0.5 K/UL (ref 0.3–1)
MONOCYTES NFR BLD: 15.7 % (ref 4–15)
NEUTROPHILS # BLD AUTO: 1.5 K/UL (ref 1.8–7.7)
NEUTROPHILS NFR BLD: 47.1 % (ref 38–73)
NRBC BLD-RTO: 0 /100 WBC
PLATELET # BLD AUTO: 123 K/UL (ref 150–450)
PMV BLD AUTO: 9.9 FL (ref 9.2–12.9)
POTASSIUM SERPL-SCNC: 3.9 MMOL/L (ref 3.5–5.1)
PROT SERPL-MCNC: 6.9 G/DL (ref 6–8.4)
RBC # BLD AUTO: 4.16 M/UL (ref 4–5.4)
SODIUM SERPL-SCNC: 136 MMOL/L (ref 136–145)
VANCOMYCIN SERPL-MCNC: 17.1 UG/ML
WBC # BLD AUTO: 3.25 K/UL (ref 3.9–12.7)

## 2022-10-14 PROCEDURE — 85025 COMPLETE CBC W/AUTO DIFF WBC: CPT | Performed by: INTERNAL MEDICINE

## 2022-10-14 PROCEDURE — 94761 N-INVAS EAR/PLS OXIMETRY MLT: CPT

## 2022-10-14 PROCEDURE — 97535 SELF CARE MNGMENT TRAINING: CPT

## 2022-10-14 PROCEDURE — 99900035 HC TECH TIME PER 15 MIN (STAT)

## 2022-10-14 PROCEDURE — 63600175 PHARM REV CODE 636 W HCPCS: Performed by: INTERNAL MEDICINE

## 2022-10-14 PROCEDURE — 25000003 PHARM REV CODE 250: Performed by: NURSE PRACTITIONER

## 2022-10-14 PROCEDURE — 63600175 PHARM REV CODE 636 W HCPCS: Performed by: STUDENT IN AN ORGANIZED HEALTH CARE EDUCATION/TRAINING PROGRAM

## 2022-10-14 PROCEDURE — 63600175 PHARM REV CODE 636 W HCPCS: Performed by: NURSE PRACTITIONER

## 2022-10-14 PROCEDURE — 27000221 HC OXYGEN, UP TO 24 HOURS

## 2022-10-14 PROCEDURE — 11000001 HC ACUTE MED/SURG PRIVATE ROOM

## 2022-10-14 PROCEDURE — 97162 PT EVAL MOD COMPLEX 30 MIN: CPT | Performed by: PHYSICAL THERAPIST

## 2022-10-14 PROCEDURE — 27000207 HC ISOLATION

## 2022-10-14 PROCEDURE — 25000003 PHARM REV CODE 250: Performed by: INTERNAL MEDICINE

## 2022-10-14 PROCEDURE — 25000242 PHARM REV CODE 250 ALT 637 W/ HCPCS: Performed by: INTERNAL MEDICINE

## 2022-10-14 PROCEDURE — 94640 AIRWAY INHALATION TREATMENT: CPT

## 2022-10-14 PROCEDURE — 97530 THERAPEUTIC ACTIVITIES: CPT | Performed by: PHYSICAL THERAPIST

## 2022-10-14 PROCEDURE — 97530 THERAPEUTIC ACTIVITIES: CPT

## 2022-10-14 PROCEDURE — 80053 COMPREHEN METABOLIC PANEL: CPT | Performed by: INTERNAL MEDICINE

## 2022-10-14 PROCEDURE — 25000003 PHARM REV CODE 250: Performed by: STUDENT IN AN ORGANIZED HEALTH CARE EDUCATION/TRAINING PROGRAM

## 2022-10-14 PROCEDURE — 80202 ASSAY OF VANCOMYCIN: CPT | Performed by: STUDENT IN AN ORGANIZED HEALTH CARE EDUCATION/TRAINING PROGRAM

## 2022-10-14 PROCEDURE — 97165 OT EVAL LOW COMPLEX 30 MIN: CPT

## 2022-10-14 RX ORDER — ONDANSETRON 2 MG/ML
4 INJECTION INTRAMUSCULAR; INTRAVENOUS EVERY 6 HOURS PRN
Status: DISCONTINUED | OUTPATIENT
Start: 2022-10-14 | End: 2022-10-16

## 2022-10-14 RX ADMIN — ACETAMINOPHEN 650 MG: 325 TABLET ORAL at 09:10

## 2022-10-14 RX ADMIN — CEFEPIME 1 G: 1 INJECTION, POWDER, FOR SOLUTION INTRAMUSCULAR; INTRAVENOUS at 04:10

## 2022-10-14 RX ADMIN — IPRATROPIUM BROMIDE AND ALBUTEROL SULFATE 3 ML: 2.5; .5 SOLUTION RESPIRATORY (INHALATION) at 07:10

## 2022-10-14 RX ADMIN — PROMETHAZINE HYDROCHLORIDE 12.5 MG: 25 INJECTION INTRAMUSCULAR; INTRAVENOUS at 11:10

## 2022-10-14 RX ADMIN — ATORVASTATIN CALCIUM 80 MG: 40 TABLET, FILM COATED ORAL at 09:10

## 2022-10-14 RX ADMIN — MEGESTROL ACETATE 40 MG: 40 TABLET ORAL at 09:10

## 2022-10-14 RX ADMIN — ONDANSETRON 4 MG: 2 INJECTION INTRAMUSCULAR; INTRAVENOUS at 04:10

## 2022-10-14 RX ADMIN — BUSPIRONE HYDROCHLORIDE 10 MG: 5 TABLET ORAL at 09:10

## 2022-10-14 RX ADMIN — HEPARIN SODIUM 5000 UNITS: 5000 INJECTION INTRAVENOUS; SUBCUTANEOUS at 09:10

## 2022-10-14 RX ADMIN — HEPARIN SODIUM 5000 UNITS: 5000 INJECTION INTRAVENOUS; SUBCUTANEOUS at 01:10

## 2022-10-14 RX ADMIN — HEPARIN SODIUM 5000 UNITS: 5000 INJECTION INTRAVENOUS; SUBCUTANEOUS at 05:10

## 2022-10-14 RX ADMIN — MUPIROCIN: 20 OINTMENT TOPICAL at 09:10

## 2022-10-14 RX ADMIN — DULOXETINE 30 MG: 30 CAPSULE, DELAYED RELEASE ORAL at 09:10

## 2022-10-14 RX ADMIN — PROMETHAZINE HYDROCHLORIDE 12.5 MG: 25 INJECTION INTRAMUSCULAR; INTRAVENOUS at 10:10

## 2022-10-14 RX ADMIN — ONDANSETRON 4 MG: 2 INJECTION INTRAMUSCULAR; INTRAVENOUS at 09:10

## 2022-10-14 RX ADMIN — SODIUM CHLORIDE 500 ML: 0.9 INJECTION, SOLUTION INTRAVENOUS at 07:10

## 2022-10-14 RX ADMIN — OSELTAMIVIR PHOSPHATE 30 MG: 30 CAPSULE ORAL at 09:10

## 2022-10-14 RX ADMIN — IPRATROPIUM BROMIDE AND ALBUTEROL SULFATE 3 ML: 2.5; .5 SOLUTION RESPIRATORY (INHALATION) at 01:10

## 2022-10-14 RX ADMIN — ONDANSETRON 4 MG: 2 INJECTION INTRAMUSCULAR; INTRAVENOUS at 02:10

## 2022-10-14 RX ADMIN — SEVELAMER CARBONATE 800 MG: 800 TABLET, FILM COATED ORAL at 04:10

## 2022-10-14 RX ADMIN — PROMETHAZINE HYDROCHLORIDE 6.25 MG: 25 INJECTION INTRAMUSCULAR; INTRAVENOUS at 06:10

## 2022-10-14 RX ADMIN — MIRTAZAPINE 15 MG: 7.5 TABLET ORAL at 09:10

## 2022-10-14 NOTE — PT/OT/SLP EVAL
Occupational Therapy   Evaluation and Treatment    Name: Katie Quevedo  MRN: [id number]  Admitting Diagnosis:  Acute on chronic respiratory failure  Recent Surgery: * No surgery found *      Recommendations:     Discharge Recommendations: other (see comments) (TBD; anticipate post acute placement secondary due to patient reporting that patient's spouse has also been sick)  Discharge Equipment Recommendations:  other (see comments) (TBD; may need bedside commode)  Barriers to discharge:  Decreased caregiver support    Assessment:     Katie Quevedo is a 77 y.o. female with a medical diagnosis of Acute on chronic respiratory failure.  She presents with ..The primary encounter diagnosis was Acute on chronic respiratory failure. Diagnoses of SOB (shortness of breath), Chest pain, and Influenza were also pertinent to this visit.  . Performance deficits affecting function: weakness, impaired endurance, impaired sensation, impaired self care skills, impaired functional mobility, impaired balance, pain, impaired cardiopulmonary response to activity.      Skilled OT evaluation completed,  limited eval due to nausea and headache. Patient agreeable and able to transition from lying to seated with supervision, completed extended oral hygiene sitting unsupported, followed by sit to stand CGA and performed lateral side steps to the HOB with handheld assist. At this time, discharge disposition recommendations to be determined, but potentially post acute placement for continued therapy secondary due to patient's spouse (per patient's report) is also sick. Skilled OT in acute setting to follow to maximize increased activity tolerance and progression toward return modified independence level in ADL / light IADL routine. No DME needs noted.    Rehab Prognosis: Good; patient would benefit from acute skilled OT services to address these deficits and reach maximum level of function.       Plan:     Patient to be seen 3 x/week to address the  "above listed problems via self-care/home management, therapeutic activities, therapeutic exercises  Plan of Care Reviewed with: patient    Subjective     Chief Complaint: endorses nausea since waking up in morning, feeling unwell, reports "do not feel like I can do anything"  Patient/Family Comments/goals: reports desire to get back to home, functioning at her baseline again    Occupational Profile:  Living Environment: Patient lives in a single story home, 1 step to enter with spouse. She states spouse just had the flu. They have a walk in tub with a built in bench  Previous level of function: modified independent with rollator; uses 3L of O2 at home per report  Roles and Routines: ADLs, shares IADL household tasks with spouse, drives occasionally, enjoys going shopping  Equipment Used at Home:   rollator, nebulizer, O2, walk in tub with built in seat, grab bar  Assistance upon Discharge: limited assist per report secondary due to spouse also being sick; does report other family; potentially need for post acute therapy/short term rehab placement post d/c from hospital     Pain/Comfort:  Pain Rating 1: other (see comments) (does not endorse pain; reports nausea)  Pain Rating Post-Intervention 1: other (see comments) (reports continued nausea but improved some after sitting eob/coughing up phelgm and then brushing teeth)  Pain Addressed 2: Pre-medicate for activity, Reposition, Nurse notified    Patients cultural, spiritual, Rastafarian conflicts given the current situation: yes (self reports Restorationist)    Objective:     Communicated with: nursing prior to session.  Patient found HOB elevated with telemetry, PureWick, peripheral IV, bed alarm upon OT entry to room.    General Precautions: Standard, fall, droplet   Orthopedic Precautions:N/A   Braces: N/A  Respiratory Status: Nasal cannula, flow 3 L/min    Occupational Performance:    Bed Mobility:    Patient completed Scooting/Bridging with supervision  Patient " completed Supine to Sit with supervision  Patient completed Sit to Supine with supervision    Functional Mobility/Transfers:  Patient completed Sit <> Stand Transfer with contact guard assistance  with  hand-held assist   Functional Mobility: deferred extensive mobility  / toilet transfer attempt due to severe nausea; patient performed lateral side steps toward head of bed with handheld assist before descent to bed    Activities of Daily Living:  Grooming: stand by assistance ; education for implementation of increased oral hygiene to promote reduction of phelgm  Feeding: unable to eat due to nauesea but agreeable to drink smoothie; self reports pain with using L hand to open items due to tendontis (has own adaptive method for openingitems)  Patient with firm, kind declination for attempts of further assessment due to nausea    Cognitive/Visual Perceptual:  Cognitive/Psychosocial Skills:     -       Oriented to: Person, Place, Time, and Situation   -       Follows Commands/attention:Follows multistep  commands  -       Memory: functional  -       Mood/Affect/Coping skills/emotional control: Appropriate to situation  Visual/Perceptual:  -Intact      Physical Exam:  Postural examination/scapula alignment:    -       Rounded shoulders  Upper Extremity Range of Motion:  WFL  Upper Extremity Strength: WFL ; R shoulder AROM into scaption, endorses L forearm tendonitis  Denies numbness/ tingling    AMPAC 6 Click ADL:  AMPAC Total Score: 19    Treatment & Education:  Patient agreeable to session, educated on role of OT. Initially reports unable to move; with min encouragement and after education regarding importance of movement/ oral hygiene (alexander due to resp failure/ influenza) patient agreeable.  Patient guided for slow transition. Sat eob unsupported, completing oral hygiene  / grooming / washing face with standby assist. Guided for seated breathing exercise with emphasis on shoulder retraction. Patient agreeable to stand  and perform eob stepping; sit to stand, lateral stepping handheld assist with CGA. Reported feeling better post tx.    Patient left HOB elevated with all lines intact, call button in reach, bed alarm on, and nursing notified    GOALS:   Multidisciplinary Problems       Occupational Therapy Goals          Problem: Occupational Therapy    Goal Priority Disciplines Outcome Interventions   Occupational Therapy Goal     OT, PT/OT Ongoing, Progressing    Description: Goals to be met by: 11/11/2022     Patient will increase functional independence with ADLs by performing:    LE Dressing with Modified Leroy inclusive of item retrieval.  Grooming while standing at sink with Leroy.  Toileting from toilet with Modified Leroy for hygiene and clothing management.   Toilet transfer to toilet with Modified Leroy.  Upper extremity exercise program x8 reps per handout with incorporation of pain free gentle movements, with independence.  100% reciprocation of fall risk reduction recommendations to support ease of transition back to home.                         History:     Past Medical History:   Diagnosis Date    Acute congestive heart failure 02/10/2020    Anemia     Bilateral renal cysts     Cataract     Chronic LBP 7/26/2012    Chronic pain     CKD (chronic kidney disease), stage IV     Colon polyp 2013    COPD (chronic obstructive pulmonary disease)     Dehydration     Encounter for blood transfusion     HTN (hypertension)     Lumbar spondylosis     Melanoma     of the lip    Metabolic bone disease     Migraines, neuralgic     Osteoporosis     Primary osteoarthritis of both knees     s/p Rt TKA    Pulmonary embolism with infarction     Seizures 1972    x1 only    Subdeltoid bursitis, L>R. 9/27/2012    Ulcer     Vitamin D deficiency disease          Past Surgical History:   Procedure Laterality Date    BLADDER SUSPENSION      CATARACT EXTRACTION  11/18/13    left eye    CERVICAL LAMINECTOMY      x3,  fusion x1    COLONOSCOPY  2009    DECLOTTING OF ARTERIOVENOUS GRAFT Left 1/3/2022    Procedure: [id number];  Surgeon: Lindsey Louie MD;  Location: [facility] OR;  Service: Vascular;  Laterality: Left;    DECLOTTING OF ARTERIOVENOUS GRAFT Left 2/8/2022    Procedure: [id number]-[id number];  Surgeon: Lindsey Louie MD;  Location: [facility] OR;  Service: Vascular;  Laterality: Left;    DECLOTTING OF ARTERIOVENOUS GRAFT Left 4/8/2022    Procedure: [id number];  Surgeon: Lindsey Louie MD;  Location: [facility] OR;  Service: Vascular;  Laterality: Left;    FISTULOGRAM N/A 2/27/2020    Procedure: Fistulogram;  Surgeon: Noe Benitez MD;  Location: [facility] CATH LAB/EP;  Service: Cardiology;  Laterality: N/A;    HYSTERECTOMY      JOINT REPLACEMENT  2001    total right knee     LUMBAR LAMINECTOMY      x 3, fusion x1    OOPHORECTOMY      PERIPHERAL ANGIOGRAPHY N/A 3/9/2020    Procedure: Peripheral angiography;  Surgeon: Jacinto Henriquez MD;  Location: [facility] CATH LAB/EP;  Service: Cardiology;  Laterality: N/A;    PLACEMENT OF ARTERIOVENOUS GRAFT Left 1/21/2020    Procedure: INSERTION, GRAFT, ARTERIOVENOUS;  Surgeon: Lindsey Louie MD;  Location: [facility] OR;  Service: General;  Laterality: Left;    PLACEMENT OF ARTERIOVENOUS GRAFT Right 7/22/2022    Procedure: INSERTION, GRAFT, ARTERIOVENOUS;  Surgeon: Lindsey Louie MD;  Location: [facility] OR;  Service: General;  Laterality: Right;    PLACEMENT OF DUAL-LUMEN VASCULAR CATHETER Right 4/16/2022    Procedure: INSERTION-CATHETER-RICKI;  Surgeon: Lindsey Louie MD;  Location: [facility] OR;  Service: General;  Laterality: Right;    THROMBECTOMY Left 2/3/2020    Procedure: THROMBECTOMY;  Surgeon: Lindsey Louie MD;  Location: [facility] OR;  Service: General;  Laterality: Left;    THROMBECTOMY  3/9/2020    Procedure: Thrombectomy;  Surgeon: Jacinto Henriquez MD;  Location: [facility] CATH LAB/EP;  Service: Cardiology;;    THROMBECTOMY Left 4/7/2022    Procedure:  THROMBECTOMY;  Surgeon: Lindsey Louie MD;  Location: [facility];  Service: General;  Laterality: Left;       Time Tracking:     OT Date of Treatment: 10/14/22  OT Start Time: 1148  OT Stop Time: 1220  OT Total Time (min): 32 min    Billable Minutes:Evaluation 9 min  Self Care/Home Management 15 min  Therapeutic Activity 8 min    10/14/2022

## 2022-10-14 NOTE — PT/OT/SLP EVAL
Physical Therapy Evaluation    Patient Name:  Katie Quevedo   MRN:  [id number]    Recommendations:     Discharge Recommendations:   (TBD based on further functional assessment)   Discharge Equipment Recommendations: bedside commode   Barriers to discharge: Decreased caregiver support and decreased functional mobility tolerance    Assessment:     Katie Quevedo is a 77 y.o. female admitted with a medical diagnosis of Acute on chronic respiratory failure.  She presents with the following impairments/functional limitations:  weakness, gait instability, impaired cardiopulmonary response to activity, impaired endurance, impaired balance, decreased lower extremity function, decreased safety awareness, impaired self care skills, impaired functional mobility. Pt refused to sit EOB 2/2 nausea and not feeling well at all.  Pt did roll bilaterally with supervision and scooted 2' to HOB with supervision.      Rehab Prognosis: Fair; patient would benefit from acute skilled PT services to address these deficits and reach maximum level of function.    Recent Surgery: * No surgery found *      Plan:     During this hospitalization, patient to be seen 6 x/week to address the identified rehab impairments via gait training, therapeutic activities, therapeutic exercises, neuromuscular re-education and progress toward the following goals:    Plan of Care Expires:  11/09/22    Subjective     Chief Complaint: nausea  Patient/Family Comments/goals: feel better  Pain/Comfort:  Pain Rating 1:  (Pt c/o nausea, not feeling well)    Patients cultural, spiritual, Buddhism conflicts given the current situation: no    Living Environment:  Pt was living with her  that just had the flu and has medical problems in a [facility] with 1 step to enter.  Pt has a WIS with BIS and GB. Pt walked some with and some without a Rollator.    Prior to admission, patients level of function was Mod Ind with Rollator.  Equipment used at home: oxygen, rollator,  nebulizer.  DME owned (not currently used): none.  Upon discharge, patient will have assistance from TBD.    Objective:     Communicated with nurse prior to session.  Patient found HOB elevated with bed alarm, peripheral IV, telemetry  upon PT entry to room.    General Precautions: Standard, fall   Orthopedic Precautions:    Braces:    Respiratory Status: Nasal cannula, flow 3 L/min    Exams:  Pt was grossly oriented x 4.  Pt has BLE AROM WFL with decreased DF ROM noted.  Pt has 2+ to 4+/5 RLE and 3+ to 5/5 LLE strength.    Functional Mobility:  Pt refused to sit EOB 2/2 nausea and not feeling well at all.  Pt did roll bilaterally with supervision and scooted 2' to HOB with supervision.        AM-PAC 6 CLICK MOBILITY  Total Score:19     Patient left HOB elevated with all lines intact, call button in reach, bed alarm on, nurse notified, and heels floated .    GOALS:   Multidisciplinary Problems       Physical Therapy Goals          Problem: Physical Therapy    Goal Priority Disciplines Outcome Goal Variances Interventions   Physical Therapy Goal     PT, PT/OT Ongoing, Progressing     Description: Goals to be met by: 22    Patient will increase functional independence with mobility by performin.  Supine to/from sit with Mod Ind  2.  Bed to/from chair with Mod Ind with or without RW  3.  Ambulate 125' with RW with supervision  4.  Up in chair 2 hours                         History:     Past Medical History:   Diagnosis Date    Acute congestive heart failure 02/10/2020    Anemia     Bilateral renal cysts     Cataract     Chronic LBP 2012    Chronic pain     CKD (chronic kidney disease), stage IV     Colon polyp     COPD (chronic obstructive pulmonary disease)     Dehydration     Encounter for blood transfusion     HTN (hypertension)     Lumbar spondylosis     Melanoma     of the lip    Metabolic bone disease     Migraines, neuralgic     Osteoporosis     Primary osteoarthritis of both knees     s/p Rt  TKA    Pulmonary embolism with infarction     Seizures 1972    x1 only    Subdeltoid bursitis, L>R. 9/27/2012    Ulcer     Vitamin D deficiency disease        Past Surgical History:   Procedure Laterality Date    BLADDER SUSPENSION      CATARACT EXTRACTION  11/18/13    left eye    CERVICAL LAMINECTOMY      x3, fusion x1    COLONOSCOPY  2009    DECLOTTING OF ARTERIOVENOUS GRAFT Left 1/3/2022    Procedure: [id number];  Surgeon: Lindsey Louie MD;  Location: [facility] OR;  Service: Vascular;  Laterality: Left;    DECLOTTING OF ARTERIOVENOUS GRAFT Left 2/8/2022    Procedure: [id number]-[id number]-LO;  Surgeon: Lindsey Louie MD;  Location: [facility] OR;  Service: Vascular;  Laterality: Left;    DECLOTTING OF ARTERIOVENOUS GRAFT Left 4/8/2022    Procedure: [id number]-CZEHP-NA;  Surgeon: Lindsey Louie MD;  Location: [facility] OR;  Service: Vascular;  Laterality: Left;    FISTULOGRAM N/A 2/27/2020    Procedure: Fistulogram;  Surgeon: Noe Benitez MD;  Location: [facility] CATH LAB/EP;  Service: Cardiology;  Laterality: N/A;    HYSTERECTOMY      JOINT REPLACEMENT  2001    total right knee     LUMBAR LAMINECTOMY      x 3, fusion x1    OOPHORECTOMY      PERIPHERAL ANGIOGRAPHY N/A 3/9/2020    Procedure: Peripheral angiography;  Surgeon: Jacinto Henriquez MD;  Location: [facility] CATH LAB/EP;  Service: Cardiology;  Laterality: N/A;    PLACEMENT OF ARTERIOVENOUS GRAFT Left 1/21/2020    Procedure: INSERTION, GRAFT, ARTERIOVENOUS;  Surgeon: Lindsey Louie MD;  Location: [facility] OR;  Service: General;  Laterality: Left;    PLACEMENT OF ARTERIOVENOUS GRAFT Right 7/22/2022    Procedure: INSERTION, GRAFT, ARTERIOVENOUS;  Surgeon: Lindsey Louie MD;  Location: [facility] OR;  Service: General;  Laterality: Right;    PLACEMENT OF DUAL-LUMEN VASCULAR CATHETER Right 4/16/2022    Procedure: INSERTION-CATHETER-RICKI;  Surgeon: Lindsey Louie MD;  Location: [facility] OR;  Service: General;  Laterality: Right;    THROMBECTOMY Left  2/3/2020    Procedure: THROMBECTOMY;  Surgeon: Lindsey Louie MD;  Location: [facility] OR;  Service: General;  Laterality: Left;    THROMBECTOMY  3/9/2020    Procedure: Thrombectomy;  Surgeon: Jacinto Henriquez MD;  Location: [facility] CATH LAB/EP;  Service: Cardiology;;    THROMBECTOMY Left 4/7/2022    Procedure: THROMBECTOMY;  Surgeon: Lindsey Louie MD;  Location: [facility] OR;  Service: General;  Laterality: Left;       Time Tracking:     PT Received On: 10/14/22  PT Start Time: 0925     PT Stop Time: 0950  PT Total Time (min): 25 min     Billable Minutes: Evaluation 15 and Therapeutic Activity 10      10/14/2022

## 2022-10-14 NOTE — PROGRESS NOTES
Pharmacokinetic Assessment Follow Up: IV Vancomycin    Vancomycin serum concentration assessment(s):    The random level was drawn correctly and can be used to guide therapy at this time. The measurement is below the desired definitive target range of 15 to 20 mcg/mL.    Vancomycin Regimen Plan:    Re-dose when the random level is less than 20 mcg/mL, next level to be drawn at 10/14 on 0400    Drug levels (last 3 results):  Recent Labs   Lab Result Units 10/13/22  1813   Vancomycin, Random ug/mL 8.4       Pharmacy will continue to follow and monitor vancomycin.    Please contact pharmacy at extension [phone] for questions regarding this assessment.    Thank you for the consult,   Tariq Andrews       Patient brief summary:  Katie Quevedo is a 77 y.o. female initiated on antimicrobial therapy with IV Vancomycin for treatment of lower respiratory infection    The patient's current regimen is dose after dialysis give 500mg     Drug Allergies:   Review of patient's allergies indicates:   Allergen Reactions    Aspirin      Other reaction(s): hx of ulcers    Tetracycline Swelling     Other reaction(s): Swelling    Penicillins Rash     Other reaction(s): Hives  Other reaction(s): Rash  Other reaction(s): Rash  Other reaction(s): Hives       Actual Body Weight:   40kg    Renal Function:   Estimated Creatinine Clearance: 16.3 mL/min (A) (based on SCr of 1.8 mg/dL (H)).,     Dialysis Method (if applicable):  intermittent HD    CBC (last 72 hours):  Recent Labs   Lab Result Units 10/11/22  1245 10/12/22  0641 10/12/22  1720 10/13/22  0454   WBC K/uL 5.07 3.76* 3.45* 3.18*   Hemoglobin g/dL 12.5 12.7 11.6* 11.1*   Hematocrit % 40.9 41.7 36.6* 35.3*   Platelets K/uL 171 170 121* 120*   Gran % % 69.1 53.8 58.6 57.5   Lymph % % 7.9* 23.1 18.8 25.2   Mono % % 17.4* 19.7* 19.7* 13.2   Eosinophil % % 3.2 1.3 0.3 1.6   Basophil % % 1.6 0.8 0.6 0.6   Differential Method  Automated Automated Automated Automated       Metabolic Panel (last  72 hours):  Recent Labs   Lab Result Units 10/11/22  1245 10/12/22  0641 10/12/22  1720 10/13/22  0454   Sodium mmol/L 136 131* 134* 130*  132*   Potassium mmol/L 3.8 4.7 3.9 3.9  3.9   Chloride mmol/L 100 102 101 103  104   CO2 mmol/L 20* 20* 23 21*  21*   Glucose mg/dL 108 62* 85 78  78   BUN mg/dL 18 23 15 21  21   Creatinine mg/dL 2.3* 2.1* 1.8* 1.8*  1.8*   Albumin g/dL 3.7 3.1* 2.7* 2.6*  2.6*   Total Bilirubin mg/dL 0.6 0.5 0.4 0.4   Alkaline Phosphatase U/L 284* 305* 258* 243*   AST U/L 30 43* 60* 61*   ALT U/L 19 21 30 27   Phosphorus mg/dL  --   --   --  2.9       Vancomycin Administrations:  vancomycin given in the last 96 hours                     vancomycin 750 mg in dextrose 5 % 250 mL IVPB (ready to mix system) (mg) 750 mg New Bag 10/1943                    Microbiologic Results:  Microbiology Results (last 7 days)       Procedure Component Value Units Date/Time    Blood culture [[id number]] Collected: 10/12/22 1720    Order Status: Completed Specimen: Blood Updated: 10/13/22 0915     Blood Culture, Routine No Growth to date    Influenza A & B by Molecular [[id number]]  (Abnormal) Collected: 10/11/22 1246    Order Status: Completed Specimen: Nasopharyngeal Swab Updated: 10/11/22 1325     Influenza A, Molecular Positive     Influenza B, Molecular Negative     Flu A & B Source Nasal swab

## 2022-10-14 NOTE — ASSESSMENT & PLAN NOTE
Likely secondary to influenza infection.  Vaccinated for influenza about 4 months ago.  Possible secondary bacterial infection given hypotension   Tamiflu to complete 5 day.  Broad-spectrum antibiotics for now  Continue scheduled inhalers    Reported to be on 2 L oxygen at baseline for COPD, confirmed with patient's .  Currently on 3 L oxygen by nasal cannula.

## 2022-10-14 NOTE — ASSESSMENT & PLAN NOTE
Hypotension with systolics in the 60s noted on 10/12  Transiently responded to 1 L IV fluid bolus and was placed on Levophed for a few hours, now off pressors     Blood cultures show no growth till date.  Suspect infectious/sepsis picture.  Continue broad-spectrum antibiotics cefepime and vancomycin day 3.  Echocardiogram showed preserved LVEF.     No

## 2022-10-14 NOTE — PROGRESS NOTES
Pharmacokinetic Assessment Follow Up: IV Vancomycin    Vancomycin serum concentration assessment(s):    The random level was drawn correctly and can be used to guide therapy at this time. The measurement is within the desired definitive target range of 15 to 20 mcg/mL.    Vancomycin Regimen Plan:  Vancomycin 500mg iv x1 post HD  Re-dose when the random level is less than 20 mcg/mL, next level to be drawn at 0400 on 10/17    Drug levels (last 3 results):  Recent Labs   Lab Result Units 10/13/22  1813 10/14/22  0443   Vancomycin, Random ug/mL 8.4 17.1       Pharmacy will continue to follow and monitor vancomycin.    Please contact pharmacy at extension [phone] for questions regarding this assessment.    Thank you for the consult,   Raymundo Pretty       Patient brief summary:  Katie Quevedo is a 77 y.o. female initiated on antimicrobial therapy with IV Vancomycin for treatment of lower respiratory infection    The patient's current regimen is vancomycin dose by level    Drug Allergies:   Review of patient's allergies indicates:   Allergen Reactions    Aspirin      Other reaction(s): hx of ulcers    Tetracycline Swelling     Other reaction(s): Swelling    Penicillins Rash     Other reaction(s): Hives  Other reaction(s): Rash  Other reaction(s): Rash  Other reaction(s): Hives       Actual Body Weight:   40.8kg    Renal Function:   Estimated Creatinine Clearance: 16.3 mL/min (A) (based on SCr of 1.8 mg/dL (H)).,     Dialysis Method (if applicable):  intermittent HD    CBC (last 72 hours):  Recent Labs   Lab Result Units 10/11/22  1245 10/12/22  0641 10/12/22  1720 10/13/22  0454 10/14/22  0443   WBC K/uL 5.07 3.76* 3.45* 3.18* 3.25*   Hemoglobin g/dL 12.5 12.7 11.6* 11.1* 12.9   Hematocrit % 40.9 41.7 36.6* 35.3* 40.3   Platelets K/uL 171 170 121* 120* 123*   Gran % % 69.1 53.8 58.6 57.5 47.1   Lymph % % 7.9* 23.1 18.8 25.2 31.7   Mono % % 17.4* 19.7* 19.7* 13.2 15.7*   Eosinophil % % 3.2 1.3 0.3 1.6 4.3   Basophil % % 1.6 0.8  0.6 0.6 0.6   Differential Method  Automated Automated Automated Automated Automated       Metabolic Panel (last 72 hours):  Recent Labs   Lab Result Units 10/11/22  1245 10/12/22  0641 10/12/22  1720 10/13/22  0454   Sodium mmol/L 136 131* 134* 130*  132*   Potassium mmol/L 3.8 4.7 3.9 3.9  3.9   Chloride mmol/L 100 102 101 103  104   CO2 mmol/L 20* 20* 23 21*  21*   Glucose mg/dL 108 62* 85 78  78   BUN mg/dL 18 23 15 21  21   Creatinine mg/dL 2.3* 2.1* 1.8* 1.8*  1.8*   Albumin g/dL 3.7 3.1* 2.7* 2.6*  2.6*   Total Bilirubin mg/dL 0.6 0.5 0.4 0.4   Alkaline Phosphatase U/L 284* 305* 258* 243*   AST U/L 30 43* 60* 61*   ALT U/L 19 21 30 27   Phosphorus mg/dL  --   --   --  2.9       Vancomycin Administrations:  vancomycin given in the last 96 hours                     vancomycin 500 mg in dextrose 5 % 100 mL IVPB (ready to mix system) (mg) 500 mg New Bag 10/13/22 2140    vancomycin 750 mg in dextrose 5 % 250 mL IVPB (ready to mix system) (mg) 750 mg New Bag 10/1943                    Microbiologic Results:  Microbiology Results (last 7 days)       Procedure Component Value Units Date/Time    Blood culture [[id number]] Collected: 10/12/22 1720    Order Status: Completed Specimen: Blood Updated: 10/14/22 0613     Blood Culture, Routine No Growth to date      No Growth to date    Influenza A & B by Molecular [[id number]]  (Abnormal) Collected: 10/11/22 1246    Order Status: Completed Specimen: Nasopharyngeal Swab Updated: 10/11/22 1325     Influenza A, Molecular Positive     Influenza B, Molecular Negative     Flu A & B Source Nasal swab

## 2022-10-14 NOTE — PROGRESS NOTES
Nephrology Progress Note       Consult Requested By: Andreia Salazar MD  Reason for Consult: ESRD     SUBJECTIVE:        Review of Systems   Constitutional:  Positive for malaise/fatigue. Negative for chills and fever.   HENT:  Negative for congestion and sore throat.    Eyes:  Negative for blurred vision, double vision and photophobia.   Respiratory:  Positive for cough and shortness of breath.    Cardiovascular:  Negative for chest pain, palpitations and leg swelling.   Gastrointestinal:  Negative for abdominal pain, diarrhea, nausea and vomiting.   Genitourinary:  Negative for dysuria and urgency.   Musculoskeletal:  Negative for joint pain and myalgias.   Skin:  Negative for itching and rash.   Neurological:  Negative for dizziness, sensory change, weakness and headaches.   Endo/Heme/Allergies:  Negative for polydipsia. Does not bruise/bleed easily.   Psychiatric/Behavioral:  Negative for depression.      Past Medical History:   Diagnosis Date    Acute congestive heart failure 02/10/2020    Anemia     Bilateral renal cysts     Cataract     Chronic LBP 7/26/2012    Chronic pain     CKD (chronic kidney disease), stage IV     Colon polyp 2013    COPD (chronic obstructive pulmonary disease)     Dehydration     Encounter for blood transfusion     HTN (hypertension)     Lumbar spondylosis     Melanoma     of the lip    Metabolic bone disease     Migraines, neuralgic     Osteoporosis     Primary osteoarthritis of both knees     s/p Rt TKA    Pulmonary embolism with infarction     Seizures 1972    x1 only    Subdeltoid bursitis, L>R. 9/27/2012    Ulcer     Vitamin D deficiency disease          OBJECTIVE:     Vital Signs (Most Recent)  Vitals:    10/14/22 0412 10/14/22 0424 10/14/22 0743 10/14/22 0744   BP: 137/61   (!) 93/52   BP Location: Right arm   Right arm   Patient Position: Lying   Lying   Pulse: 87 86 90 86   Resp: 18  16 18   Temp: 96.3 °F (35.7 °C)   97 °F (36.1 °C)   TempSrc: Oral   Oral   SpO2: (!) 92%  96%  (!) 92%   Weight:       Height:                 Medications:          Physical Exam  Vitals and nursing note reviewed.   Constitutional:       General: She is not in acute distress.     Appearance: She is ill-appearing. She is not diaphoretic.   HENT:      Head: Normocephalic and atraumatic.      Mouth/Throat:      Pharynx: No oropharyngeal exudate.   Eyes:      General: No scleral icterus.     Conjunctiva/sclera: Conjunctivae normal.      Pupils: Pupils are equal, round, and reactive to light.   Cardiovascular:      Rate and Rhythm: Normal rate and regular rhythm.      Heart sounds: Normal heart sounds. No murmur heard.  Pulmonary:      Effort: Pulmonary effort is normal. No respiratory distress.      Breath sounds: Wheezing present. No rales.   Abdominal:      General: Bowel sounds are normal. There is no distension.      Palpations: Abdomen is soft.      Tenderness: There is no abdominal tenderness.   Musculoskeletal:         General: Normal range of motion.      Cervical back: Normal range of motion and neck supple.      Comments: AVF     Skin:     General: Skin is warm and dry.      Findings: No erythema.   Neurological:      Mental Status: She is alert and oriented to person, place, and time.      Cranial Nerves: No cranial nerve deficit.   Psychiatric:         Mood and Affect: Affect normal.         Cognition and Memory: Memory normal.         Judgment: Judgment normal.       Laboratory:  Recent Labs   Lab 10/12/22  1720 10/13/22  0454 10/14/22  0443   WBC 3.45* 3.18* 3.25*   HGB 11.6* 11.1* 12.9   HCT 36.6* 35.3* 40.3   * 120* 123*   MONO 19.7*  0.7 13.2  0.4 15.7*  0.5     Recent Labs   Lab 10/12/22  0641 10/12/22  1720 10/13/22  0454   * 134* 130*  132*   K 4.7 3.9 3.9  3.9    101 103  104   CO2 20* 23 21*  21*   BUN 23 15 21  21   CREATININE 2.1* 1.8* 1.8*  1.8*   CALCIUM 9.0 8.1* 7.9*  7.9*   PHOS  --   --  2.9       Diagnostic Results:  X-Ray: Reviewed  US: Reviewed  Echo:  Reviewed  ASSESSMENT/PLAN:     1. ESRD  - HD MWF [facility] With Dr Aura Diggs   -- now SOB hypoxia Flue + HD with UF Wednesday and Thursday  BP low Lytes acceptable no HD today plan for Tomorrow   -- Keep MWF after That   -- Daily Renal Function Panel  -- Avoid Hypotension.  -- Renally dose all meds  2. HTN (I10) -  Controlled   3. Anemia of chronic kidney disease treated with BILL (N18.9 D63.1) -      Recent Labs   Lab 10/12/22  1720 10/13/22  0454 10/14/22  0443   HGB 11.6* 11.1* 12.9   HCT 36.6* 35.3* 40.3   * 120* 123*       Iron   Lab Results   Component Value Date    IRON 13 (L) 02/12/2020    TIBC 462 (H) 02/12/2020    FERRITIN 148 07/17/2019       4. MBD (E88.9 M90.80) -  Recent Labs   Lab 10/13/22  0454   CALCIUM 7.9*  7.9*   PHOS 2.9     No results for input(s): MG in the last 168 hours.    Lab Results   Component Value Date    .0 (H) 12/28/2018    CALCIUM 7.9 (L) 10/13/2022    CALCIUM 7.9 (L) 10/13/2022    PHOS 2.9 10/13/2022     Lab Results   Component Value Date    SCBXWNZC08SY 26 (L) 02/12/2020     Sevelamer   Lab Results   Component Value Date    CO2 21 (L) 10/13/2022    CO2 21 (L) 10/13/2022       5. Nutrition/Hypoalbuminemia (E88.09) -   Recent Labs   Lab 10/12/22  1720 10/13/22  0454   ALBUMIN 2.7* 2.6*  2.6*     Nepro with meals TID. Renal vitamins daily      Thank you for the consult, will follow  With any question please call [phone]  Ramo Da Silva MD    [facility]  EDGARD Bustillos MD, FACP,   NEIL Diggs MD,   MD CURTIS Benoit MD E. V. Harmon, NP    [address]  [place], [address] [phone]

## 2022-10-14 NOTE — PLAN OF CARE
Patient received on   3 Lpm NC with SpO2    97%. Pt with no apparent distress noted. Will continue to monitor.

## 2022-10-14 NOTE — PLAN OF CARE
Problem: Infection  Goal: Absence of Infection Signs and Symptoms  10/14/2022 0549 by Mason Diaz RN  Outcome: Ongoing, Progressing  10/14/2022 0548 by Mason Diaz RN  Outcome: Ongoing, Progressing     Problem: Gas Exchange Impaired  Goal: Optimal Gas Exchange  10/14/2022 0549 by Mason Diaz RN  Outcome: Ongoing, Progressing  10/14/2022 0548 by Mason Diaz RN  Outcome: Ongoing, Progressing     Problem: Fall Injury Risk  Goal: Absence of Fall and Fall-Related Injury  10/14/2022 0549 by Mason Diaz RN  Outcome: Ongoing, Progressing  10/14/2022 0548 by Mason Diaz RN  Outcome: Ongoing, Progressing     Problem: Skin Injury Risk Increased  Goal: Skin Health and Integrity  10/14/2022 0549 by Mason Diaz RN  Outcome: Ongoing, Progressing  10/14/2022 0548 by Mason Diaz RN  Outcome: Ongoing, Progressing     Problem: Electrolyte Imbalance (Chronic Kidney Disease)  Goal: Electrolyte Balance  10/14/2022 0549 by Mason Diaz RN  Outcome: Ongoing, Progressing  10/14/2022 0548 by Mason Diaz RN  Outcome: Ongoing, Progressing     Problem: Renal Function Impairment (Chronic Kidney Disease)  Goal: Minimize Renal Failure Effects  Outcome: Ongoing, Progressing     Problem: Pain Acute  Goal: Acceptable Pain Control and Functional Ability  Outcome: Ongoing, Progressing      Patient home medications sent to security. Patient complain of headache, tylenol given with mild relief. Patient complain of nausea last night unrelieved by zofran. Phenergan given x once with positive effect.  Patient vomited this AM, zofran given. Patient resting in bed, no distress noted. Will continue to monitor.

## 2022-10-14 NOTE — PLAN OF CARE
Skilled OT evaluation completed, detailed note to follow; limited eval due to nausea and headache. Patient agreeable and able to transition from lying to seated with supervision, completed extended oral hygiene sitting unsupported, followed by sit to stand CGA and performed lateral side steps to the HOB with handheld assist. At this time, discharge disposition recommendations to be determined, but potentially post acute placement for continued therapy secondary due to patient's spouse (per patient's report) is also sick. Skilled OT in acute setting to follow to maximize increased activity tolerance and progression toward return modified independence level in ADL / light IADL routine. No DME needs noted.    Problem: Occupational Therapy  Goal: Occupational Therapy Goal  Description: Goals to be met by: 11/11/2022     Patient will increase functional independence with ADLs by performing:    LE Dressing with Modified [place] inclusive of item retrieval.  Grooming while standing at sink with [place].  Toileting from toilet with Modified [place] for hygiene and clothing management.   Toilet transfer to toilet with Modified [place].  Upper extremity exercise program x8 reps per handout with incorporation of pain free gentle movements, with independence.  100% reciprocation of fall risk reduction recommendations to support ease of transition back to home.    Outcome: Ongoing, Progressing

## 2022-10-14 NOTE — PLAN OF CARE
Problem: Adult Inpatient Plan of Care  Goal: Plan of Care Review  Outcome: Ongoing, Progressing  Goal: Patient-Specific Goal (Individualized)  Outcome: Ongoing, Progressing  Goal: Absence of Hospital-Acquired Illness or Injury  Outcome: Ongoing, Progressing  Goal: Optimal Comfort and Wellbeing  Outcome: Ongoing, Progressing  Goal: Readiness for Transition of Care  Outcome: Ongoing, Progressing     Problem: COPD (Chronic Obstructive Pulmonary Disease) Comorbidity  Goal: Maintenance of COPD Symptom Control  Outcome: Ongoing, Progressing     Problem: Heart Failure Comorbidity  Goal: Maintenance of Heart Failure Symptom Control  Outcome: Ongoing, Progressing     Problem: Hypertension Comorbidity  Goal: Blood Pressure in Desired Range  Outcome: Ongoing, Progressing     Problem: Infection  Goal: Absence of Infection Signs and Symptoms  Outcome: Ongoing, Progressing

## 2022-10-14 NOTE — ASSESSMENT & PLAN NOTE
Severe nausea not relieved by Zofran or Phenergan.  Higher dose of Phenergan relieved some.  Denies any abdominal pain or diarrhea.  Reports she is hungry but cannot eat due to nausea.  Unclear etiology.  Could be in setting of flu but will get CT head to rule CVA given recent hypotension.

## 2022-10-14 NOTE — PROGRESS NOTES
[facility] Medicine  Progress Note    Patient Name: Katie Quevedo  MRN: [id number]  Patient Class: IP- Inpatient   Admission Date: 10/11/2022  Length of Stay: 3 days  Attending Physician: Andreia Salazar MD  Primary Care Provider: Kingston Verduzco MD        Subjective:     Principal Problem:Acute on chronic respiratory failure        HPI:  Katie is a 77 y.o. female with past medical history of CHF, COPD, ESRD, hypertension, migraines, PE who presents with complaint of increased shortness of breath, increased cough compared to usual and fever for the last 2 days.  She states that her  recently was diagnosed with the flu.  She is normally on 3 L of oxygen via nasal cannula at home.  Her pulse ox was reading in the 80s today.  She did breathing treatments this morning which she thought maybe helped a little bit.  In the emergency department she did test positive for influenza.  She is also febrile with a T-max of 101°.  She is admitted for acute on chronic respiratory failure in the setting of influenza infection.      Overview/Hospital Course:  No notes on file    Interval History:  No acute events overnight.  Blood pressure remains stable.  Complained of severe nausea not relieved by Zofran and Phenergan.  Denies any abdominal pain.  Feels hungry but is unable to eat due to nausea.  Sleepy but awakens to verbal commands and communicates.    Review of Systems   Constitutional:  Positive for activity change, appetite change and fatigue.   Eyes:  Negative for visual disturbance.   Respiratory:  Positive for cough and shortness of breath.    Cardiovascular:  Negative for chest pain and leg swelling.   Gastrointestinal:  Positive for nausea. Negative for abdominal pain, diarrhea and vomiting.   Genitourinary:  Negative for dysuria, frequency and urgency.   Musculoskeletal:  Negative for back pain.   Neurological:  Negative for light-headedness and headaches.   Psychiatric/Behavioral:  Negative for  confusion.    Objective:     Vital Signs (Most Recent):  Temp: 97.6 °F (36.4 °C) (10/14/22 1726)  Pulse: 91 (10/14/22 1726)  Resp: 18 (10/14/22 1726)  BP: (!) 118/55 (10/14/22 1726)  SpO2: 95 % (10/14/22 1726)   Vital Signs (24h Range):  Temp:  [96.3 °F (35.7 °C)-97.6 °F (36.4 °C)] 97.6 °F (36.4 °C)  Pulse:  [] 91  Resp:  [16-20] 18  SpO2:  [90 %-96 %] 95 %  BP: ()/(52-61) 118/55     Weight: 40.8 kg (90 lb)  Body mass index is 16.46 kg/m².    Intake/Output Summary (Last 24 hours) at 10/14/2022 1817  Last data filed at 10/14/2022 1200  Gross per 24 hour   Intake 240 ml   Output 50 ml   Net 190 ml        Physical Exam  Vitals and nursing note reviewed.   Constitutional:       General: She is in acute distress (Due to nausea).      Appearance: She is ill-appearing.      Comments: Somnolent but awakens to verbal commands and answers questions appropriately  Diffuse muscle wasting noted   HENT:      Head: Normocephalic and atraumatic.      Mouth/Throat:      Mouth: Mucous membranes are dry.   Eyes:      General: No scleral icterus.  Cardiovascular:      Rate and Rhythm: Normal rate and regular rhythm.      Pulses: Normal pulses.      Heart sounds: Normal heart sounds. No murmur heard.  Pulmonary:      Effort: Pulmonary effort is normal. No respiratory distress.      Breath sounds: No wheezing.   Abdominal:      Palpations: Abdomen is soft.      Tenderness: There is no abdominal tenderness.   Musculoskeletal:      Cervical back: Neck supple.      Right lower leg: No edema.      Left lower leg: No edema.   Skin:     General: Skin is warm and dry.      Findings: No bruising or rash.   Neurological:      Mental Status: She is oriented to person, place, and time.      Comments: Moves all extremities voluntarily   Psychiatric:         Mood and Affect: Mood normal.       Significant Labs: All pertinent labs within the past 24 hours have been reviewed.    Significant Imaging: I have reviewed all pertinent imaging  results/findings within the past 24 hours.      Assessment/Plan:      * Acute on chronic respiratory failure  Likely secondary to influenza infection.  Vaccinated for influenza about 4 months ago.  Possible secondary bacterial infection given hypotension   Tamiflu to complete 5 day.  Broad-spectrum antibiotics for now  Continue scheduled inhalers    Reported to be on 2 L oxygen at baseline for COPD, confirmed with patient's .  Currently on 3 L oxygen by nasal cannula.    Hypotension  Hypotension with systolics in the 60s noted on 10/12  Transiently responded to 1 L IV fluid bolus and was placed on Levophed for a few hours, now off pressors     Blood cultures show no growth till date.  Suspect infectious/sepsis picture.  Continue broad-spectrum antibiotics cefepime and vancomycin day 3.  Echocardiogram showed preserved LVEF.      Chronic respiratory failure  On 3 L oxygen at baseline for COPD      Influenza A virus present  will treat influenza with a five-day course of Tamiflu.          ESRD (end stage renal disease) on dialysis  Follows with Dr. Diggs. Nephrology consulted      Nausea  Severe nausea not relieved by Zofran or Phenergan.  Higher dose of Phenergan relieved some.  Denies any abdominal pain or diarrhea.  Reports she is hungry but cannot eat due to nausea.  Unclear etiology.  Could be in setting of flu but will get CT head to rule CVA given recent hypotension.        VTE Risk Mitigation (From admission, onward)         Ordered     heparin (porcine) injection 4,100 Units  As needed (PRN)         10/12/22 1215     heparin (porcine) injection 5,000 Units  Every 8 hours         10/11/22 2039     IP VTE HIGH RISK PATIENT  Once         10/11/22 2039     Place sequential compression device  Until discontinued         10/11/22 2039                Discharge Planning   LAITH:      Code Status: Full Code   Is the patient medically ready for discharge?:     Reason for patient still in hospital (select all that  apply): Patient trending condition  Discharge Plan A: Home Health        Updated patient's  Dre Quevedo via telephone on current plan of care.    Andreia Salazar MD  Department of Hospital Medicine   [facility]

## 2022-10-14 NOTE — SUBJECTIVE & OBJECTIVE
Interval History:  No acute events overnight.  Blood pressure remains stable.  Complained of severe nausea not relieved by Zofran and Phenergan.  Denies any abdominal pain.  Feels hungry but is unable to eat due to nausea.  Sleepy but awakens to verbal commands and communicates.    Review of Systems   Constitutional:  Positive for activity change, appetite change and fatigue.   Eyes:  Negative for visual disturbance.   Respiratory:  Positive for cough and shortness of breath.    Cardiovascular:  Negative for chest pain and leg swelling.   Gastrointestinal:  Positive for nausea. Negative for abdominal pain, diarrhea and vomiting.   Genitourinary:  Negative for dysuria, frequency and urgency.   Musculoskeletal:  Negative for back pain.   Neurological:  Negative for light-headedness and headaches.   Psychiatric/Behavioral:  Negative for confusion.    Objective:     Vital Signs (Most Recent):  Temp: 97.6 °F (36.4 °C) (10/14/22 1726)  Pulse: 91 (10/14/22 1726)  Resp: 18 (10/14/22 1726)  BP: (!) 118/55 (10/14/22 1726)  SpO2: 95 % (10/14/22 1726)   Vital Signs (24h Range):  Temp:  [96.3 °F (35.7 °C)-97.6 °F (36.4 °C)] 97.6 °F (36.4 °C)  Pulse:  [] 91  Resp:  [16-20] 18  SpO2:  [90 %-96 %] 95 %  BP: ()/(52-61) 118/55     Weight: 40.8 kg (90 lb)  Body mass index is 16.46 kg/m².    Intake/Output Summary (Last 24 hours) at 10/14/2022 1817  Last data filed at 10/14/2022 1200  Gross per 24 hour   Intake 240 ml   Output 50 ml   Net 190 ml        Physical Exam  Vitals and nursing note reviewed.   Constitutional:       General: She is in acute distress (Due to nausea).      Appearance: She is ill-appearing.      Comments: Somnolent but awakens to verbal commands and answers questions appropriately  Diffuse muscle wasting noted   HENT:      Head: Normocephalic and atraumatic.      Mouth/Throat:      Mouth: Mucous membranes are dry.   Eyes:      General: No scleral icterus.  Cardiovascular:      Rate and Rhythm: Normal  rate and regular rhythm.      Pulses: Normal pulses.      Heart sounds: Normal heart sounds. No murmur heard.  Pulmonary:      Effort: Pulmonary effort is normal. No respiratory distress.      Breath sounds: No wheezing.   Abdominal:      Palpations: Abdomen is soft.      Tenderness: There is no abdominal tenderness.   Musculoskeletal:      Cervical back: Neck supple.      Right lower leg: No edema.      Left lower leg: No edema.   Skin:     General: Skin is warm and dry.      Findings: No bruising or rash.   Neurological:      Mental Status: She is oriented to person, place, and time.      Comments: Moves all extremities voluntarily   Psychiatric:         Mood and Affect: Mood normal.       Significant Labs: All pertinent labs within the past 24 hours have been reviewed.    Significant Imaging: I have reviewed all pertinent imaging results/findings within the past 24 hours.

## 2022-10-14 NOTE — PLAN OF CARE
Problem: Adult Inpatient Plan of Care  Goal: Plan of Care Review  Outcome: Ongoing, Progressing   Updated care plan. Chart review complete.

## 2022-10-14 NOTE — PROGRESS NOTES
Pharmacokinetic Assessment Follow Up: IV Vancomycin    Vancomycin serum concentration assessment(s):    The random level was drawn correctly and can be used to guide therapy at this time. The measurement is within the desired definitive target range of 15 to 20 mcg/mL.    Vancomycin Regimen Plan:  Vancomycin 500mg IV x1 post HD tomorrow.  Re-dose when the random level is less than 20 mcg/mL, next level to be drawn at 0400 on 10/17    Drug levels (last 3 results):  Recent Labs   Lab Result Units 10/13/22  1813 10/14/22  0443   Vancomycin, Random ug/mL 8.4 17.1       Pharmacy will continue to follow and monitor vancomycin.    Please contact pharmacy at extension [phone] for questions regarding this assessment.    Thank you for the consult,   Raymundo Pretty       Patient brief summary:  Katie Quevedo is a 77 y.o. female initiated on antimicrobial therapy with IV Vancomycin for treatment of lower respiratory infection    The patient's current regimen is vancomycin dose by level    Drug Allergies:   Review of patient's allergies indicates:   Allergen Reactions    Aspirin      Other reaction(s): hx of ulcers    Tetracycline Swelling     Other reaction(s): Swelling    Penicillins Rash     Other reaction(s): Hives  Other reaction(s): Rash  Other reaction(s): Rash  Other reaction(s): Hives       Actual Body Weight:   40.8kg    Renal Function:   Estimated Creatinine Clearance: 16.3 mL/min (A) (based on SCr of 1.8 mg/dL (H)).,     Dialysis Method (if applicable):  intermittent HD    CBC (last 72 hours):  Recent Labs   Lab Result Units 10/12/22  0641 10/12/22  1720 10/13/22  0454 10/14/22  0443   WBC K/uL 3.76* 3.45* 3.18* 3.25*   Hemoglobin g/dL 12.7 11.6* 11.1* 12.9   Hematocrit % 41.7 36.6* 35.3* 40.3   Platelets K/uL 170 121* 120* 123*   Gran % % 53.8 58.6 57.5 47.1   Lymph % % 23.1 18.8 25.2 31.7   Mono % % 19.7* 19.7* 13.2 15.7*   Eosinophil % % 1.3 0.3 1.6 4.3   Basophil % % 0.8 0.6 0.6 0.6   Differential Method  Automated  Automated Automated Automated       Metabolic Panel (last 72 hours):  Recent Labs   Lab Result Units 10/12/22  0641 10/12/22  1720 10/13/22  0454   Sodium mmol/L 131* 134* 130*  132*   Potassium mmol/L 4.7 3.9 3.9  3.9   Chloride mmol/L 102 101 103  104   CO2 mmol/L 20* 23 21*  21*   Glucose mg/dL 62* 85 78  78   BUN mg/dL 23 15 21  21   Creatinine mg/dL 2.1* 1.8* 1.8*  1.8*   Albumin g/dL 3.1* 2.7* 2.6*  2.6*   Total Bilirubin mg/dL 0.5 0.4 0.4   Alkaline Phosphatase U/L 305* 258* 243*   AST U/L 43* 60* 61*   ALT U/L 21 30 27   Phosphorus mg/dL  --   --  2.9       Vancomycin Administrations:  vancomycin given in the last 96 hours                     vancomycin 500 mg in dextrose 5 % 100 mL IVPB (ready to mix system) (mg) 500 mg New Bag 10/13/22 2140    vancomycin 750 mg in dextrose 5 % 250 mL IVPB (ready to mix system) (mg) 750 mg New Bag 10/1943                    Microbiologic Results:  Microbiology Results (last 7 days)       Procedure Component Value Units Date/Time    Blood culture [[id number]] Collected: 10/12/22 1720    Order Status: Completed Specimen: Blood Updated: 10/14/22 0613     Blood Culture, Routine No Growth to date      No Growth to date    Influenza A & B by Molecular [[id number]]  (Abnormal) Collected: 10/11/22 1246    Order Status: Completed Specimen: Nasopharyngeal Swab Updated: 10/11/22 1325     Influenza A, Molecular Positive     Influenza B, Molecular Negative     Flu A & B Source Nasal swab

## 2022-10-14 NOTE — PLAN OF CARE
Problem: Physical Therapy  Goal: Physical Therapy Goal  Description: Goals to be met by: 22    Patient will increase functional independence with mobility by performin.  Supine to/from sit with Mod Ind  2.  Bed to/from chair with Mod Ind with or without RW  3.  Ambulate 125' with RW with supervision  4.  Up in chair 2 hours    Outcome: Ongoing, Progressing

## 2022-10-14 NOTE — PLAN OF CARE
10/14/22 1105   Post-Acute Status   Post-Acute Authorization Placement   Post-Acute Placement Status Referrals Sent

## 2022-10-15 LAB
ALBUMIN SERPL BCP-MCNC: 2.5 G/DL (ref 3.5–5.2)
ANION GAP SERPL CALC-SCNC: 9 MMOL/L (ref 8–16)
BUN SERPL-MCNC: 21 MG/DL (ref 8–23)
CALCIUM SERPL-MCNC: 8.2 MG/DL (ref 8.7–10.5)
CHLORIDE SERPL-SCNC: 101 MMOL/L (ref 95–110)
CO2 SERPL-SCNC: 27 MMOL/L (ref 23–29)
CREAT SERPL-MCNC: 2.2 MG/DL (ref 0.5–1.4)
EST. GFR  (NO RACE VARIABLE): 22 ML/MIN/1.73 M^2
GLUCOSE SERPL-MCNC: 81 MG/DL (ref 70–110)
PHOSPHATE SERPL-MCNC: 2.4 MG/DL (ref 2.7–4.5)
POTASSIUM SERPL-SCNC: 3.5 MMOL/L (ref 3.5–5.1)
SODIUM SERPL-SCNC: 137 MMOL/L (ref 136–145)

## 2022-10-15 PROCEDURE — 99900035 HC TECH TIME PER 15 MIN (STAT)

## 2022-10-15 PROCEDURE — 97530 THERAPEUTIC ACTIVITIES: CPT | Performed by: PHYSICAL THERAPIST

## 2022-10-15 PROCEDURE — 25000242 PHARM REV CODE 250 ALT 637 W/ HCPCS: Performed by: INTERNAL MEDICINE

## 2022-10-15 PROCEDURE — 63600175 PHARM REV CODE 636 W HCPCS: Performed by: NURSE PRACTITIONER

## 2022-10-15 PROCEDURE — 36415 COLL VENOUS BLD VENIPUNCTURE: CPT | Performed by: STUDENT IN AN ORGANIZED HEALTH CARE EDUCATION/TRAINING PROGRAM

## 2022-10-15 PROCEDURE — 94640 AIRWAY INHALATION TREATMENT: CPT

## 2022-10-15 PROCEDURE — 27000207 HC ISOLATION

## 2022-10-15 PROCEDURE — 63600175 PHARM REV CODE 636 W HCPCS: Performed by: STUDENT IN AN ORGANIZED HEALTH CARE EDUCATION/TRAINING PROGRAM

## 2022-10-15 PROCEDURE — 25000003 PHARM REV CODE 250: Performed by: NURSE PRACTITIONER

## 2022-10-15 PROCEDURE — 94761 N-INVAS EAR/PLS OXIMETRY MLT: CPT

## 2022-10-15 PROCEDURE — 25000003 PHARM REV CODE 250: Performed by: INTERNAL MEDICINE

## 2022-10-15 PROCEDURE — 80069 RENAL FUNCTION PANEL: CPT | Performed by: STUDENT IN AN ORGANIZED HEALTH CARE EDUCATION/TRAINING PROGRAM

## 2022-10-15 PROCEDURE — 25000003 PHARM REV CODE 250: Performed by: STUDENT IN AN ORGANIZED HEALTH CARE EDUCATION/TRAINING PROGRAM

## 2022-10-15 PROCEDURE — 90935 HEMODIALYSIS ONE EVALUATION: CPT

## 2022-10-15 PROCEDURE — 97116 GAIT TRAINING THERAPY: CPT | Performed by: PHYSICAL THERAPIST

## 2022-10-15 PROCEDURE — 63600175 PHARM REV CODE 636 W HCPCS: Performed by: INTERNAL MEDICINE

## 2022-10-15 PROCEDURE — 27000221 HC OXYGEN, UP TO 24 HOURS

## 2022-10-15 PROCEDURE — 11000001 HC ACUTE MED/SURG PRIVATE ROOM

## 2022-10-15 RX ADMIN — IPRATROPIUM BROMIDE AND ALBUTEROL SULFATE 3 ML: 2.5; .5 SOLUTION RESPIRATORY (INHALATION) at 07:10

## 2022-10-15 RX ADMIN — ACETAMINOPHEN 650 MG: 325 TABLET ORAL at 05:10

## 2022-10-15 RX ADMIN — IPRATROPIUM BROMIDE AND ALBUTEROL SULFATE 3 ML: 2.5; .5 SOLUTION RESPIRATORY (INHALATION) at 01:10

## 2022-10-15 RX ADMIN — MUPIROCIN: 20 OINTMENT TOPICAL at 08:10

## 2022-10-15 RX ADMIN — ONDANSETRON 4 MG: 2 INJECTION INTRAMUSCULAR; INTRAVENOUS at 05:10

## 2022-10-15 RX ADMIN — IPRATROPIUM BROMIDE AND ALBUTEROL SULFATE 3 ML: 2.5; .5 SOLUTION RESPIRATORY (INHALATION) at 08:10

## 2022-10-15 RX ADMIN — ATORVASTATIN CALCIUM 80 MG: 40 TABLET, FILM COATED ORAL at 08:10

## 2022-10-15 RX ADMIN — HEPARIN SODIUM 5000 UNITS: 5000 INJECTION INTRAVENOUS; SUBCUTANEOUS at 02:10

## 2022-10-15 RX ADMIN — SENNOSIDES AND DOCUSATE SODIUM 1 TABLET: 50; 8.6 TABLET ORAL at 08:10

## 2022-10-15 RX ADMIN — HEPARIN SODIUM 5000 UNITS: 5000 INJECTION INTRAVENOUS; SUBCUTANEOUS at 05:10

## 2022-10-15 RX ADMIN — MEGESTROL ACETATE 40 MG: 40 TABLET ORAL at 08:10

## 2022-10-15 RX ADMIN — MELATONIN TAB 3 MG 6 MG: 3 TAB at 08:10

## 2022-10-15 RX ADMIN — MIRTAZAPINE 15 MG: 7.5 TABLET ORAL at 08:10

## 2022-10-15 RX ADMIN — CEFEPIME 1 G: 1 INJECTION, POWDER, FOR SOLUTION INTRAMUSCULAR; INTRAVENOUS at 08:10

## 2022-10-15 RX ADMIN — SEVELAMER CARBONATE 800 MG: 800 TABLET, FILM COATED ORAL at 12:10

## 2022-10-15 RX ADMIN — SEVELAMER CARBONATE 800 MG: 800 TABLET, FILM COATED ORAL at 08:10

## 2022-10-15 RX ADMIN — DULOXETINE 30 MG: 30 CAPSULE, DELAYED RELEASE ORAL at 08:10

## 2022-10-15 RX ADMIN — ACETAMINOPHEN 650 MG: 325 TABLET ORAL at 08:10

## 2022-10-15 RX ADMIN — BUSPIRONE HYDROCHLORIDE 10 MG: 5 TABLET ORAL at 08:10

## 2022-10-15 RX ADMIN — PROMETHAZINE HYDROCHLORIDE 12.5 MG: 25 INJECTION INTRAMUSCULAR; INTRAVENOUS at 07:10

## 2022-10-15 RX ADMIN — PROMETHAZINE HYDROCHLORIDE 12.5 MG: 25 INJECTION INTRAMUSCULAR; INTRAVENOUS at 12:10

## 2022-10-15 NOTE — PROGRESS NOTES
[facility] Medicine  Progress Note    Patient Name: Katie Quevedo  MRN: [id number]  Patient Class: IP- Inpatient   Admission Date: 10/11/2022  Length of Stay: 4 days  Attending Physician: Andreia Salazar MD  Primary Care Provider: Kingston Verduzco MD        Subjective:     Principal Problem:Acute on chronic respiratory failure    HPI:  Katie is a 77 y.o. female with past medical history of CHF, COPD, ESRD, hypertension, migraines, PE who presents with complaint of increased shortness of breath, increased cough compared to usual and fever for the last 2 days.  She states that her  recently was diagnosed with the flu.  She is normally on 3 L of oxygen via nasal cannula at home.  Her pulse ox was reading in the 80s today.  She did breathing treatments this morning which she thought maybe helped a little bit.  In the emergency department she did test positive for influenza.  She is also febrile with a T-max of 101°.  She is admitted for acute on chronic respiratory failure in the setting of influenza infection.      Overview/Hospital Course:  No notes on file    Interval History:  No acute events overnight.  Still complaining of nausea this morning.  Not relieved with Zofran.  Somewhat relieved with Phenergan as it puts her to sleep.  More awake today and states her shortness of breath on minimal exertion persists.  CT head ordered yesterday done this morning, results pending.    Review of Systems   Constitutional:  Positive for activity change, appetite change and fatigue.   Eyes:  Negative for visual disturbance.   Respiratory:  Positive for cough and shortness of breath.    Cardiovascular:  Negative for chest pain and leg swelling.   Gastrointestinal:  Positive for nausea. Negative for abdominal pain, diarrhea and vomiting.   Genitourinary:  Negative for dysuria, frequency and urgency.   Musculoskeletal:  Negative for back pain.   Neurological:  Negative for light-headedness and headaches.    Psychiatric/Behavioral:  Negative for confusion.    Objective:     Vital Signs (Most Recent):  Temp: 97 °F (36.1 °C) (10/15/22 1138)  Pulse: 100 (10/15/22 1344)  Resp: 18 (10/15/22 1344)  BP: 135/63 (10/15/22 1138)  SpO2: (!) 92 % (O2 sat post TX was 98%) (10/15/22 1344)   Vital Signs (24h Range):  Temp:  [96 °F (35.6 °C)-98.2 °F (36.8 °C)] 97 °F (36.1 °C)  Pulse:  [] 100  Resp:  [16-24] 18  SpO2:  [79 %-97 %] 92 %  BP: (105-135)/(55-67) 135/63     Weight: 40.8 kg (90 lb)  Body mass index is 16.46 kg/m².  No intake or output data in the 24 hours ending 10/15/22 1359     Physical Exam  Vitals and nursing note reviewed.   Constitutional:       General: She is in acute distress (Due to nausea).      Appearance: She is ill-appearing.      Comments: More awake today  Diffuse muscle wasting noted   HENT:      Head: Normocephalic and atraumatic.      Mouth/Throat:      Mouth: Mucous membranes are dry.   Eyes:      General: No scleral icterus.  Cardiovascular:      Rate and Rhythm: Normal rate and regular rhythm.      Pulses: Normal pulses.      Heart sounds: Normal heart sounds. No murmur heard.  Pulmonary:      Effort: Pulmonary effort is normal. No respiratory distress.      Breath sounds: Wheezing present.      Comments: Decreased breath sounds bilaterally  Abdominal:      Palpations: Abdomen is soft.      Tenderness: There is no abdominal tenderness.   Musculoskeletal:      Cervical back: Neck supple.      Right lower leg: No edema.      Left lower leg: No edema.   Skin:     General: Skin is warm and dry.      Findings: No bruising or rash.   Neurological:      Mental Status: She is oriented to person, place, and time.      Comments: Moves all extremities voluntarily   Psychiatric:         Mood and Affect: Mood normal.       Significant Labs: All pertinent labs within the past 24 hours have been reviewed.    Significant Imaging: I have reviewed all pertinent imaging results/findings within the past 24  hours.      Assessment/Plan:      * Acute on chronic respiratory failure  Likely secondary to influenza infection.  Vaccinated for influenza about 4 months ago.  Possible secondary bacterial infection given hypotension   Tamiflu to complete 5 day.  Day 4/5 broad-spectrum antibiotics (cefepime, IV vancomycin) for presumed secondary bacterial infection  Continue scheduled inhalers    Reported to be on 2 L oxygen at baseline for COPD, confirmed with patient's .  Currently on 3 L oxygen by nasal cannula.    Hypotension  Hypotension with systolics in the 60s noted on 10/12  Transiently responded to 1 L IV fluid bolus and was placed on Levophed for a few hours, now off pressors     Blood cultures show no growth till date.  Suspect infectious/sepsis picture.  Continue broad-spectrum antibiotics cefepime and vancomycin day 3.  Echocardiogram showed preserved LVEF.      Chronic respiratory failure  On 3 L oxygen at baseline for COPD      Influenza A virus present  will treat influenza with a five-day course of Tamiflu.          ESRD (end stage renal disease) on dialysis  Follows with Dr. Diggs. Nephrology consulted      Nausea  Severe nausea not relieved by Zofran or Phenergan.  Higher dose of Phenergan relieved some.  Denies any abdominal pain or diarrhea.  Reports she is hungry but cannot eat due to nausea.  Unclear etiology.  Could be in setting of flu but will get CT head to rule CVA given recent hypotension.  CT head pending.  ?Uremia the last BUN 21.  Getting hemodialysis today.      VTE Risk Mitigation (From admission, onward)         Ordered     heparin (porcine) injection 4,100 Units  As needed (PRN)         10/12/22 1215     heparin (porcine) injection 5,000 Units  Every 8 hours         10/11/22 2039     IP VTE HIGH RISK PATIENT  Once         10/11/22 2039     Place sequential compression device  Until discontinued         10/11/22 2039                Discharge Planning   LAITH:      Code Status: Full Code    Is the patient medically ready for discharge?:     Reason for patient still in hospital (select all that apply): Patient trending condition  Discharge Plan A: Home Health      Seen by PT.  Recommend home health services at the time of discharge.    Andreia Salazar MD  Department of Hospital Medicine   [facility]

## 2022-10-15 NOTE — PLAN OF CARE
Problem: COPD (Chronic Obstructive Pulmonary Disease) Comorbidity  Goal: Maintenance of COPD Symptom Control  Outcome: Ongoing, Progressing     Problem: Infection  Goal: Absence of Infection Signs and Symptoms  Outcome: Ongoing, Progressing     Problem: Gas Exchange Impaired  Goal: Optimal Gas Exchange  Outcome: Ongoing, Progressing     Problem: Fall Injury Risk  Goal: Absence of Fall and Fall-Related Injury  Outcome: Ongoing, Progressing     Problem: Hemodynamic Instability (Hemodialysis)  Goal: Effective Tissue Perfusion  Outcome: Ongoing, Progressing     Problem: Skin Injury Risk Increased  Goal: Skin Health and Integrity  Outcome: Ongoing, Progressing     Problem: Heart Failure Comorbidity  Goal: Maintenance of Heart Failure Symptom Control  Outcome: Ongoing, Progressing     Problem: Electrolyte Imbalance (Chronic Kidney Disease)  Goal: Electrolyte Balance  Outcome: Ongoing, Progressing     Problem: Renal Function Impairment (Chronic Kidney Disease)  Goal: Minimize Renal Failure Effects  Outcome: Ongoing, Progressing     Problem: Pain Acute  Goal: Acceptable Pain Control and Functional Ability  Outcome: Ongoing, Progressing

## 2022-10-15 NOTE — PROGRESS NOTES
Nephrology Progress Note       Consult Requested By: Andreia Salazar MD  Reason for Consult: ESRD     SUBJECTIVE:        Review of Systems   Constitutional:  Positive for malaise/fatigue. Negative for chills and fever.   HENT:  Negative for congestion and sore throat.    Eyes:  Negative for blurred vision, double vision and photophobia.   Respiratory:  Positive for cough and shortness of breath.    Cardiovascular:  Negative for chest pain, palpitations and leg swelling.   Gastrointestinal:  Negative for abdominal pain, diarrhea, nausea and vomiting.   Genitourinary:  Negative for dysuria and urgency.   Musculoskeletal:  Negative for joint pain and myalgias.   Skin:  Negative for itching and rash.   Neurological:  Negative for dizziness, sensory change, weakness and headaches.   Endo/Heme/Allergies:  Negative for polydipsia. Does not bruise/bleed easily.   Psychiatric/Behavioral:  Negative for depression.      Past Medical History:   Diagnosis Date    Acute congestive heart failure 02/10/2020    Anemia     Bilateral renal cysts     Cataract     Chronic LBP 7/26/2012    Chronic pain     CKD (chronic kidney disease), stage IV     Colon polyp 2013    COPD (chronic obstructive pulmonary disease)     Dehydration     Encounter for blood transfusion     HTN (hypertension)     Lumbar spondylosis     Melanoma     of the lip    Metabolic bone disease     Migraines, neuralgic     Osteoporosis     Primary osteoarthritis of both knees     s/p Rt TKA    Pulmonary embolism with infarction     Seizures 1972    x1 only    Subdeltoid bursitis, L>R. 9/27/2012    Ulcer     Vitamin D deficiency disease          OBJECTIVE:     Vital Signs (Most Recent)  Vitals:    10/15/22 0111 10/15/22 0524 10/15/22 0746 10/15/22 0753   BP: 105/63 133/67 118/62    BP Location: Right arm Right arm     Patient Position: Lying Lying Lying    Pulse: 98 85 82 83   Resp: 16 16 18 17   Temp: 98.2 °F (36.8 °C) 97.7 °F (36.5 °C) 96 °F (35.6 °C)    TempSrc: Oral  Oral Oral    SpO2: (!) 93% 97% (!) 81% 96%   Weight:       Height:                 Medications:          Physical Exam  Vitals and nursing note reviewed.   Constitutional:       General: She is not in acute distress.     Appearance: She is ill-appearing. She is not diaphoretic.   HENT:      Head: Normocephalic and atraumatic.      Mouth/Throat:      Pharynx: No oropharyngeal exudate.   Eyes:      General: No scleral icterus.     Conjunctiva/sclera: Conjunctivae normal.      Pupils: Pupils are equal, round, and reactive to light.   Cardiovascular:      Rate and Rhythm: Normal rate and regular rhythm.      Heart sounds: Normal heart sounds. No murmur heard.  Pulmonary:      Effort: Pulmonary effort is normal. No respiratory distress.      Breath sounds: Wheezing present. No rales.   Abdominal:      General: Bowel sounds are normal. There is no distension.      Palpations: Abdomen is soft.      Tenderness: There is no abdominal tenderness.   Musculoskeletal:         General: Normal range of motion.      Cervical back: Normal range of motion and neck supple.      Comments: AVF     Skin:     General: Skin is warm and dry.      Findings: No erythema.   Neurological:      Mental Status: She is alert and oriented to person, place, and time.      Cranial Nerves: No cranial nerve deficit.   Psychiatric:         Mood and Affect: Affect normal.         Cognition and Memory: Memory normal.         Judgment: Judgment normal.       Laboratory:  Recent Labs   Lab 10/12/22  1720 10/13/22  0454 10/14/22  0443   WBC 3.45* 3.18* 3.25*   HGB 11.6* 11.1* 12.9   HCT 36.6* 35.3* 40.3   * 120* 123*   MONO 19.7*  0.7 13.2  0.4 15.7*  0.5       Recent Labs   Lab 10/13/22  0454 10/14/22  0443 10/15/22  0259   *  132* 136 137   K 3.9  3.9 3.9 3.5     104 99 101   CO2 21*  21* 24 27   BUN 21  21 17 21   CREATININE 1.8*  1.8* 1.7* 2.2*   CALCIUM 7.9*  7.9* 8.5* 8.2*   PHOS 2.9  --  2.4*         Diagnostic  Results:  X-Ray: Reviewed  US: Reviewed  Echo: Reviewed  ASSESSMENT/PLAN:     1. ESRD  - HD MWF [facility] With Dr Aura Diggs   -- now SOB hypoxia Flue + HD with UF Wednesday and Thursday  BP low -- today better  HD today plan for Monday after   -- Keep MWF after That   -- Daily Renal Function Panel  -- Avoid Hypotension.  -- Renally dose all meds  2. HTN (I10) -  Controlled   3. Anemia of chronic kidney disease treated with BILL (N18.9 D63.1) -      Recent Labs   Lab 10/12/22  1720 10/13/22  0454 10/14/22  0443   HGB 11.6* 11.1* 12.9   HCT 36.6* 35.3* 40.3   * 120* 123*         Iron   Lab Results   Component Value Date    IRON 13 (L) 02/12/2020    TIBC 462 (H) 02/12/2020    FERRITIN 148 07/17/2019       4. MBD (E88.9 M90.80) -  Recent Labs   Lab 10/15/22  0259   CALCIUM 8.2*   PHOS 2.4*       No results for input(s): MG in the last 168 hours.    Lab Results   Component Value Date    .0 (H) 12/28/2018    CALCIUM 8.2 (L) 10/15/2022    PHOS 2.4 (L) 10/15/2022     Lab Results   Component Value Date    XJLCIZFZ08CS 26 (L) 02/12/2020     Sevelamer   Lab Results   Component Value Date    CO2 27 10/15/2022       5. Nutrition/Hypoalbuminemia (E88.09) -   Recent Labs   Lab 10/14/22  0443 10/15/22  0259   ALBUMIN 2.8* 2.5*       Nepro with meals TID. Renal vitamins daily      Thank you for the consult, will follow  With any question please call [phone]  Ramo Da Silva MD    [facility]  EDGARD Bustillos MD, FACP,   NEIL Diggs MD,   MD CURTIS Benoit MD E. V. Harmon, NP    [address]  [place], [address] [phone]

## 2022-10-15 NOTE — PLAN OF CARE
Pulse oximetry on nc 2 lpm 98%. The proper method of use, as well as anticipated side effects, of this aerosol treatment are discussed and demonstrated to the patient.  Pt on documented O2, no respiratory distress noted. Will continue to monitor.

## 2022-10-15 NOTE — PT/OT/SLP PROGRESS
Physical Therapy Treatment    Patient Name:  Katie Quevedo   MRN:  [id number]    Recommendations:     Discharge Recommendations:  home health PT   Discharge Equipment Recommendations: none   Barriers to discharge: None    Assessment:     Katie Quevedo is a 77 y.o. female admitted with a medical diagnosis of Acute on chronic respiratory failure.  She presents with the following impairments/functional limitations:  weakness, gait instability, impaired cardiopulmonary response to activity, impaired endurance, impaired balance, decreased lower extremity function, impaired self care skills, impaired functional mobility. Pt supine to sit with supervision.  Pt ambulated 10' with RW with CG/supervision.      Rehab Prognosis: Good; patient would benefit from acute skilled PT services to address these deficits and reach maximum level of function.    Recent Surgery: * No surgery found *      Plan:     During this hospitalization, patient to be seen 6 x/week to address the identified rehab impairments via gait training, therapeutic activities, therapeutic exercises, neuromuscular re-education and progress toward the following goals:    Plan of Care Expires:  11/08/22    Subjective     Chief Complaint: would like breakfast  Patient/Family Comments/goals: eat  Pain/Comfort:  Pain Rating 1: 0/10      Objective:     Communicated with nurse prior to session.  Patient found HOB elevated with telemetry, PureWick, peripheral IV, bed alarm upon PT entry to room.     General Precautions: Standard, fall, droplet   Orthopedic Precautions:    Braces:    Respiratory Status: Nasal cannula, flow 3 L/min     Functional Mobility:  Pt supine to sit with supervision.  Pt ambulated 10' with RW with CG/supervision.        AM-PAC 6 CLICK MOBILITY  Turning over in bed (including adjusting bedclothes, sheets and blankets)?: 4  Sitting down on and standing up from a chair with arms (e.g., wheelchair, bedside commode, etc.): 3  Moving from lying on back to  sitting on the side of the bed?: 4  Moving to and from a bed to a chair (including a wheelchair)?: 3  Need to walk in hospital room?: 3  Climbing 3-5 steps with a railing?: 3  Basic Mobility Total Score: 20       Therapeutic Activities and Exercises:   Pt sat at EOB 8 min    Patient left up in chair with all lines intact, call button in reach, and nurse and NA  notified..    GOALS:   Multidisciplinary Problems       Physical Therapy Goals          Problem: Physical Therapy    Goal Priority Disciplines Outcome Goal Variances Interventions   Physical Therapy Goal     PT, PT/OT Ongoing, Progressing     Description: Goals to be met by: 22    Patient will increase functional independence with mobility by performin.  Supine to/from sit with Mod Ind  2.  Bed to/from chair with Mod Ind with or without RW  3.  Ambulate 125' with RW with supervision  4.  Up in chair 2 hours                         Time Tracking:     PT Received On: 10/15/22  PT Start Time: 0800     PT Stop Time: 0826  PT Total Time (min): 26 min     Billable Minutes: Gait Training 13 and Therapeutic Activity 13    Treatment Type: Treatment  PT/PTA: PT     PTA Visit Number: 0     10/15/2022

## 2022-10-15 NOTE — SUBJECTIVE & OBJECTIVE
Interval History:  No acute events overnight.  Still complaining of nausea this morning.  Not relieved with Zofran.  Somewhat relieved with Phenergan as it puts her to sleep.  More awake today and states her shortness of breath on minimal exertion persists.  CT head ordered yesterday done this morning, results pending.    Review of Systems   Constitutional:  Positive for activity change, appetite change and fatigue.   Eyes:  Negative for visual disturbance.   Respiratory:  Positive for cough and shortness of breath.    Cardiovascular:  Negative for chest pain and leg swelling.   Gastrointestinal:  Positive for nausea. Negative for abdominal pain, diarrhea and vomiting.   Genitourinary:  Negative for dysuria, frequency and urgency.   Musculoskeletal:  Negative for back pain.   Neurological:  Negative for light-headedness and headaches.   Psychiatric/Behavioral:  Negative for confusion.    Objective:     Vital Signs (Most Recent):  Temp: 97 °F (36.1 °C) (10/15/22 1138)  Pulse: 100 (10/15/22 1344)  Resp: 18 (10/15/22 1344)  BP: 135/63 (10/15/22 1138)  SpO2: (!) 92 % (O2 sat post TX was 98%) (10/15/22 1344)   Vital Signs (24h Range):  Temp:  [96 °F (35.6 °C)-98.2 °F (36.8 °C)] 97 °F (36.1 °C)  Pulse:  [] 100  Resp:  [16-24] 18  SpO2:  [79 %-97 %] 92 %  BP: (105-135)/(55-67) 135/63     Weight: 40.8 kg (90 lb)  Body mass index is 16.46 kg/m².  No intake or output data in the 24 hours ending 10/15/22 1359     Physical Exam  Vitals and nursing note reviewed.   Constitutional:       General: She is in acute distress (Due to nausea).      Appearance: She is ill-appearing.      Comments: More awake today  Diffuse muscle wasting noted   HENT:      Head: Normocephalic and atraumatic.      Mouth/Throat:      Mouth: Mucous membranes are dry.   Eyes:      General: No scleral icterus.  Cardiovascular:      Rate and Rhythm: Normal rate and regular rhythm.      Pulses: Normal pulses.      Heart sounds: Normal heart sounds. No  murmur heard.  Pulmonary:      Effort: Pulmonary effort is normal. No respiratory distress.      Breath sounds: Wheezing present.      Comments: Decreased breath sounds bilaterally  Abdominal:      Palpations: Abdomen is soft.      Tenderness: There is no abdominal tenderness.   Musculoskeletal:      Cervical back: Neck supple.      Right lower leg: No edema.      Left lower leg: No edema.   Skin:     General: Skin is warm and dry.      Findings: No bruising or rash.   Neurological:      Mental Status: She is oriented to person, place, and time.      Comments: Moves all extremities voluntarily   Psychiatric:         Mood and Affect: Mood normal.       Significant Labs: All pertinent labs within the past 24 hours have been reviewed.    Significant Imaging: I have reviewed all pertinent imaging results/findings within the past 24 hours.

## 2022-10-15 NOTE — ASSESSMENT & PLAN NOTE
Severe nausea not relieved by Zofran or Phenergan.  Higher dose of Phenergan relieved some.  Denies any abdominal pain or diarrhea.  Reports she is hungry but cannot eat due to nausea.  Unclear etiology.  Could be in setting of flu but will get CT head to rule CVA given recent hypotension.  CT head pending.  ?Uremia the last BUN 21.  Getting hemodialysis today.

## 2022-10-15 NOTE — ASSESSMENT & PLAN NOTE
Likely secondary to influenza infection.  Vaccinated for influenza about 4 months ago.  Possible secondary bacterial infection given hypotension   Tamiflu to complete 5 day.  Day 4/5 broad-spectrum antibiotics (cefepime, IV vancomycin) for presumed secondary bacterial infection  Continue scheduled inhalers    Reported to be on 2 L oxygen at baseline for COPD, confirmed with patient's .  Currently on 3 L oxygen by nasal cannula.

## 2022-10-16 LAB
ALBUMIN SERPL BCP-MCNC: 2.8 G/DL (ref 3.5–5.2)
ANION GAP SERPL CALC-SCNC: 8 MMOL/L (ref 8–16)
ANION GAP SERPL CALC-SCNC: 8 MMOL/L (ref 8–16)
ANISOCYTOSIS BLD QL SMEAR: SLIGHT
BASOPHILS NFR BLD: 0 % (ref 0–1.9)
BUN SERPL-MCNC: 11 MG/DL (ref 8–23)
BUN SERPL-MCNC: 11 MG/DL (ref 8–23)
CALCIUM SERPL-MCNC: 8.7 MG/DL (ref 8.7–10.5)
CALCIUM SERPL-MCNC: 8.7 MG/DL (ref 8.7–10.5)
CHLORIDE SERPL-SCNC: 108 MMOL/L (ref 95–110)
CHLORIDE SERPL-SCNC: 108 MMOL/L (ref 95–110)
CO2 SERPL-SCNC: 17 MMOL/L (ref 23–29)
CO2 SERPL-SCNC: 17 MMOL/L (ref 23–29)
CREAT SERPL-MCNC: 1.4 MG/DL (ref 0.5–1.4)
CREAT SERPL-MCNC: 1.4 MG/DL (ref 0.5–1.4)
DACRYOCYTES BLD QL SMEAR: ABNORMAL
DIFFERENTIAL METHOD: ABNORMAL
EOSINOPHIL NFR BLD: 1 % (ref 0–8)
ERYTHROCYTE [DISTWIDTH] IN BLOOD BY AUTOMATED COUNT: 13.6 % (ref 11.5–14.5)
EST. GFR  (NO RACE VARIABLE): 38 ML/MIN/1.73 M^2
EST. GFR  (NO RACE VARIABLE): 38 ML/MIN/1.73 M^2
GLUCOSE SERPL-MCNC: 75 MG/DL (ref 70–110)
GLUCOSE SERPL-MCNC: 75 MG/DL (ref 70–110)
HCT VFR BLD AUTO: 44.1 % (ref 37–48.5)
HGB BLD-MCNC: 13 G/DL (ref 12–16)
IMM GRANULOCYTES # BLD AUTO: ABNORMAL K/UL
IMM GRANULOCYTES NFR BLD AUTO: ABNORMAL %
LYMPHOCYTES NFR BLD: 19 % (ref 18–48)
MCH RBC QN AUTO: 30.9 PG (ref 27–31)
MCHC RBC AUTO-ENTMCNC: 29.5 G/DL (ref 32–36)
MCV RBC AUTO: 105 FL (ref 82–98)
METAMYELOCYTES NFR BLD MANUAL: 1 %
MONOCYTES NFR BLD: 1 % (ref 4–15)
MYELOCYTES NFR BLD MANUAL: 3 %
NEUTROPHILS NFR BLD: 73 % (ref 38–73)
NRBC BLD-RTO: 0 /100 WBC
OB PNL STL: POSITIVE
OVALOCYTES BLD QL SMEAR: ABNORMAL
PHOSPHATE SERPL-MCNC: 1.5 MG/DL (ref 2.7–4.5)
PLATELET # BLD AUTO: 101 K/UL (ref 150–450)
PLATELET BLD QL SMEAR: ABNORMAL
PMV BLD AUTO: 10.4 FL (ref 9.2–12.9)
POIKILOCYTOSIS BLD QL SMEAR: SLIGHT
POTASSIUM SERPL-SCNC: 4.7 MMOL/L (ref 3.5–5.1)
POTASSIUM SERPL-SCNC: 4.7 MMOL/L (ref 3.5–5.1)
RBC # BLD AUTO: 4.21 M/UL (ref 4–5.4)
SODIUM SERPL-SCNC: 133 MMOL/L (ref 136–145)
SODIUM SERPL-SCNC: 133 MMOL/L (ref 136–145)
SPHEROCYTES BLD QL SMEAR: ABNORMAL
WBC # BLD AUTO: 3.84 K/UL (ref 3.9–12.7)

## 2022-10-16 PROCEDURE — 25000003 PHARM REV CODE 250: Performed by: STUDENT IN AN ORGANIZED HEALTH CARE EDUCATION/TRAINING PROGRAM

## 2022-10-16 PROCEDURE — 27000221 HC OXYGEN, UP TO 24 HOURS

## 2022-10-16 PROCEDURE — 85007 BL SMEAR W/DIFF WBC COUNT: CPT | Performed by: NURSE PRACTITIONER

## 2022-10-16 PROCEDURE — 99900035 HC TECH TIME PER 15 MIN (STAT)

## 2022-10-16 PROCEDURE — 85027 COMPLETE CBC AUTOMATED: CPT | Performed by: NURSE PRACTITIONER

## 2022-10-16 PROCEDURE — 25000003 PHARM REV CODE 250: Performed by: INTERNAL MEDICINE

## 2022-10-16 PROCEDURE — 94761 N-INVAS EAR/PLS OXIMETRY MLT: CPT

## 2022-10-16 PROCEDURE — C9113 INJ PANTOPRAZOLE SODIUM, VIA: HCPCS | Performed by: STUDENT IN AN ORGANIZED HEALTH CARE EDUCATION/TRAINING PROGRAM

## 2022-10-16 PROCEDURE — 36415 COLL VENOUS BLD VENIPUNCTURE: CPT | Performed by: STUDENT IN AN ORGANIZED HEALTH CARE EDUCATION/TRAINING PROGRAM

## 2022-10-16 PROCEDURE — 25000003 PHARM REV CODE 250: Performed by: NURSE PRACTITIONER

## 2022-10-16 PROCEDURE — 25500020 PHARM REV CODE 255: Performed by: STUDENT IN AN ORGANIZED HEALTH CARE EDUCATION/TRAINING PROGRAM

## 2022-10-16 PROCEDURE — 25000242 PHARM REV CODE 250 ALT 637 W/ HCPCS: Performed by: INTERNAL MEDICINE

## 2022-10-16 PROCEDURE — 80069 RENAL FUNCTION PANEL: CPT | Performed by: STUDENT IN AN ORGANIZED HEALTH CARE EDUCATION/TRAINING PROGRAM

## 2022-10-16 PROCEDURE — 11000001 HC ACUTE MED/SURG PRIVATE ROOM

## 2022-10-16 PROCEDURE — 63600175 PHARM REV CODE 636 W HCPCS: Performed by: NURSE PRACTITIONER

## 2022-10-16 PROCEDURE — 94640 AIRWAY INHALATION TREATMENT: CPT

## 2022-10-16 PROCEDURE — 82272 OCCULT BLD FECES 1-3 TESTS: CPT | Performed by: STUDENT IN AN ORGANIZED HEALTH CARE EDUCATION/TRAINING PROGRAM

## 2022-10-16 PROCEDURE — 63600175 PHARM REV CODE 636 W HCPCS: Performed by: STUDENT IN AN ORGANIZED HEALTH CARE EDUCATION/TRAINING PROGRAM

## 2022-10-16 PROCEDURE — 27000207 HC ISOLATION

## 2022-10-16 RX ORDER — PANTOPRAZOLE SODIUM 40 MG/10ML
40 INJECTION, POWDER, LYOPHILIZED, FOR SOLUTION INTRAVENOUS 2 TIMES DAILY
Status: DISCONTINUED | OUTPATIENT
Start: 2022-10-16 | End: 2022-10-20 | Stop reason: HOSPADM

## 2022-10-16 RX ORDER — PANTOPRAZOLE SODIUM 40 MG/10ML
40 INJECTION, POWDER, LYOPHILIZED, FOR SOLUTION INTRAVENOUS DAILY
Status: DISCONTINUED | OUTPATIENT
Start: 2022-10-16 | End: 2022-10-16

## 2022-10-16 RX ORDER — PROCHLORPERAZINE EDISYLATE 5 MG/ML
5 INJECTION INTRAMUSCULAR; INTRAVENOUS EVERY 6 HOURS PRN
Status: DISCONTINUED | OUTPATIENT
Start: 2022-10-16 | End: 2022-10-20 | Stop reason: HOSPADM

## 2022-10-16 RX ADMIN — IOHEXOL 75 ML: 350 INJECTION, SOLUTION INTRAVENOUS at 11:10

## 2022-10-16 RX ADMIN — BUSPIRONE HYDROCHLORIDE 10 MG: 5 TABLET ORAL at 09:10

## 2022-10-16 RX ADMIN — DULOXETINE 30 MG: 30 CAPSULE, DELAYED RELEASE ORAL at 09:10

## 2022-10-16 RX ADMIN — CEFTRIAXONE 1 G: 1 INJECTION, SOLUTION INTRAVENOUS at 09:10

## 2022-10-16 RX ADMIN — MELATONIN TAB 3 MG 6 MG: 3 TAB at 11:10

## 2022-10-16 RX ADMIN — MUPIROCIN: 20 OINTMENT TOPICAL at 09:10

## 2022-10-16 RX ADMIN — IPRATROPIUM BROMIDE AND ALBUTEROL SULFATE 3 ML: 2.5; .5 SOLUTION RESPIRATORY (INHALATION) at 01:10

## 2022-10-16 RX ADMIN — PANTOPRAZOLE SODIUM 40 MG: 40 INJECTION, POWDER, FOR SOLUTION INTRAVENOUS at 08:10

## 2022-10-16 RX ADMIN — MIRTAZAPINE 15 MG: 7.5 TABLET ORAL at 08:10

## 2022-10-16 RX ADMIN — PANTOPRAZOLE SODIUM 40 MG: 40 INJECTION, POWDER, FOR SOLUTION INTRAVENOUS at 09:10

## 2022-10-16 RX ADMIN — SIMETHICONE 80 MG: 80 TABLET, CHEWABLE ORAL at 03:10

## 2022-10-16 RX ADMIN — PROCHLORPERAZINE EDISYLATE 5 MG: 5 INJECTION INTRAMUSCULAR; INTRAVENOUS at 01:10

## 2022-10-16 RX ADMIN — ACETAMINOPHEN 650 MG: 325 TABLET ORAL at 08:10

## 2022-10-16 RX ADMIN — ONDANSETRON 4 MG: 2 INJECTION INTRAMUSCULAR; INTRAVENOUS at 03:10

## 2022-10-16 RX ADMIN — PROMETHAZINE HYDROCHLORIDE 12.5 MG: 25 INJECTION INTRAMUSCULAR; INTRAVENOUS at 10:10

## 2022-10-16 RX ADMIN — PROMETHAZINE HYDROCHLORIDE 12.5 MG: 25 INJECTION INTRAMUSCULAR; INTRAVENOUS at 05:10

## 2022-10-16 RX ADMIN — PROCHLORPERAZINE EDISYLATE 5 MG: 5 INJECTION INTRAMUSCULAR; INTRAVENOUS at 08:10

## 2022-10-16 RX ADMIN — IPRATROPIUM BROMIDE AND ALBUTEROL SULFATE 3 ML: 2.5; .5 SOLUTION RESPIRATORY (INHALATION) at 08:10

## 2022-10-16 RX ADMIN — ONDANSETRON 4 MG: 2 INJECTION INTRAMUSCULAR; INTRAVENOUS at 09:10

## 2022-10-16 RX ADMIN — IPRATROPIUM BROMIDE AND ALBUTEROL SULFATE 3 ML: 2.5; .5 SOLUTION RESPIRATORY (INHALATION) at 07:10

## 2022-10-16 RX ADMIN — ATORVASTATIN CALCIUM 80 MG: 40 TABLET, FILM COATED ORAL at 09:10

## 2022-10-16 NOTE — ASSESSMENT & PLAN NOTE
Likely secondary to influenza infection.  Vaccinated for influenza about 4 months ago.  Tamiflu X 5 days    Will complete 5 days of antibiotics (cefepime/vancomycin X 3 days followed by ceftriaxone X 2 days) for suspected secondary bacterial infection given hypotension.  Blood cultures negative.    Continue scheduled inhalers    Reported to be on 2 L oxygen at baseline for COPD, confirmed with patient's .  Wean oxygen as tolerated.

## 2022-10-16 NOTE — NURSING
"Kateryna Chairs.  "Patient refused Heparin.  Has loose smelly dark brown stool.  States she had multiple stools like this today."  "

## 2022-10-16 NOTE — ASSESSMENT & PLAN NOTE
Persistent refractory nausea.  Reported episode of coffee-ground emesis, dark stools on 10/16.  Stool occult blood positive.  Chemical DVT prophylaxis on hold.  Hemoglobin stable.  Continue IV PPI.  Zofran/Phenergan ineffective.  Compazine p.r.n. for nausea control.  CT abdomen pelvis with IV contrast, showed fluid full stomach.  Was unable tolerate p.o. contrast.    GI consult.  Patient was scheduled to get a EGD today but canceled due to respiratory issues.  Clear liquid diet.  NPO after midnight.

## 2022-10-16 NOTE — NURSING
"Kateryna Oneill N.P. notified,  "Patient has been nauseated today despite prn of zofran and phenergyn.  her heart rate has been 120 non-sustained.  She is pale.  before administering heparin I noticed she had coffee ground emesis.  She feels weak.  Can we have some labs for her?  Should I hold heparin?"    Charge nurse notified.  ICU rapid nurse and  House supervisor notified per charge nurse.    "

## 2022-10-16 NOTE — PROGRESS NOTES
10/15/22 1830   Vital Signs   Pulse 101   BP 98/75   During Hemodialysis Assessment   Blood Flow Rate (mL/min) 300 mL/min   Dialysate Flow Rate (mL/min) 600 ml/min   Ultrafiltration Rate (mL/Hr) 50 mL/Hr   Arteriovenous Lines Secure Yes   Arterial Pressure (mmHg) -147 mmHg   Venous Pressure (mmHg) 140   Blood Volume Processed (Liters) 54.4 L   UF Removed (mL) 825 mL   TMP 13   Venous Line in Air Detector Yes   Transducer Dry Yes   Access Visible Yes   Intra-Hemodialysis Comments patient vomiting at present. HD completed.   Post-Hemodialysis Assessment   Rinseback Volume (mL) 250 mL   Blood Volume Processed (Liters) 54.4 L   Dialyzer Clearance Clear   Duration of Treatment 150 minutes   Additional Fluid Intake (mL) 250 mL   Total UF (mL) 825 mL   Net Fluid Removal 325   Patient Response to Treatment patient did not tolerated TX well   Post-Hemodialysis Comments post HD report given to the primary nurse

## 2022-10-16 NOTE — PLAN OF CARE
Problem: Adult Inpatient Plan of Care  Goal: Absence of Hospital-Acquired Illness or Injury  Outcome: Ongoing, Progressing     Problem: Adult Inpatient Plan of Care  Goal: Plan of Care Review  Outcome: Ongoing, Not Progressing  Goal: Patient-Specific Goal (Individualized)  Outcome: Ongoing, Not Progressing  Goal: Optimal Comfort and Wellbeing  Outcome: Ongoing, Not Progressing  Goal: Readiness for Transition of Care  Outcome: Ongoing, Not Progressing   Patient AAOx4.  Pale skin with generalized bruising.  Patient had episode of coffee ground emesis this shift.  Multiple loose malodorous stools.  Nausea medication ineffective.  Patient refused heparin.  States SOB; on 2 liters NC.  Periods of anxiety.  95% o2 sat.  Taught to cough turn and deep breath.  Fluids provided per order.  Will continue to monitor.

## 2022-10-16 NOTE — SUBJECTIVE & OBJECTIVE
Interval History:  Reported to have coffee-ground emesis and 1 episode of dark stools overnight.  Complains of persistent nausea today.  No appetite.  Looks worn out.  Answers questions appropriately.    Review of Systems   Constitutional:  Positive for activity change, appetite change and fatigue.   Eyes:  Negative for visual disturbance.   Respiratory:  Positive for cough and shortness of breath.    Cardiovascular:  Negative for chest pain and leg swelling.   Gastrointestinal:  Positive for nausea. Negative for abdominal pain, diarrhea and vomiting.   Genitourinary:  Negative for dysuria, frequency and urgency.   Musculoskeletal:  Negative for back pain.   Neurological:  Negative for light-headedness and headaches.   Psychiatric/Behavioral:  Negative for confusion.    Objective:     Vital Signs (Most Recent):  Temp: 96.7 °F (35.9 °C) (10/16/22 1211)  Pulse: 99 (10/16/22 1211)  Resp: 18 (10/16/22 1211)  BP: (!) 157/74 (10/16/22 1211)  SpO2: 95 % (10/16/22 1211)   Vital Signs (24h Range):  Temp:  [96.6 °F (35.9 °C)-97.7 °F (36.5 °C)] 96.7 °F (35.9 °C)  Pulse:  [] 99  Resp:  [16-22] 18  SpO2:  [92 %-96 %] 95 %  BP: ()/(58-80) 157/74     Weight: 40.8 kg (90 lb)  Body mass index is 16.46 kg/m².    Intake/Output Summary (Last 24 hours) at 10/16/2022 1343  Last data filed at 10/16/2022 1035  Gross per 24 hour   Intake 757.73 ml   Output 1400 ml   Net -642.27 ml        Physical Exam  Vitals and nursing note reviewed.   Constitutional:       General: She is in acute distress (Due to nausea).      Appearance: She is ill-appearing.      Comments: Somnolent again today but answering questions appropriately  Diffuse muscle wasting noted   HENT:      Head: Normocephalic and atraumatic.      Mouth/Throat:      Mouth: Mucous membranes are dry.   Eyes:      General: No scleral icterus.  Cardiovascular:      Rate and Rhythm: Normal rate and regular rhythm.      Pulses: Normal pulses.      Heart sounds: Normal heart  sounds. No murmur heard.  Pulmonary:      Effort: Pulmonary effort is normal. No respiratory distress.      Breath sounds: No wheezing.      Comments: Decreased breath sounds bilaterally  Abdominal:      Palpations: Abdomen is soft.      Tenderness: There is abdominal tenderness (Mild epigastric tenderness (soreness per patient from nausea/vomiting)).   Musculoskeletal:      Cervical back: Neck supple.      Right lower leg: No edema.      Left lower leg: No edema.   Skin:     General: Skin is warm and dry.      Findings: No bruising or rash.   Neurological:      Mental Status: She is oriented to person, place, and time.      Comments: Moves all extremities voluntarily   Psychiatric:         Mood and Affect: Mood normal.       Significant Labs: All pertinent labs within the past 24 hours have been reviewed.    Significant Imaging: I have reviewed all pertinent imaging results/findings within the past 24 hours.

## 2022-10-16 NOTE — PROGRESS NOTES
[facility] Medicine  Progress Note    Patient Name: Katie Quevedo  MRN: [id number]  Patient Class: IP- Inpatient   Admission Date: 10/11/2022  Length of Stay: 5 days  Attending Physician: Andreia Salazar MD  Primary Care Provider: Kingston Verduzco MD        Subjective:     Principal Problem:Acute on chronic respiratory failure      HPI:  Katie is a 77 y.o. female with past medical history of CHF, COPD, ESRD, hypertension, migraines, PE who presents with complaint of increased shortness of breath, increased cough compared to usual and fever for the last 2 days.  She states that her  recently was diagnosed with the flu.  She is normally on 3 L of oxygen via nasal cannula at home.  Her pulse ox was reading in the 80s today.  She did breathing treatments this morning which she thought maybe helped a little bit.  In the emergency department she did test positive for influenza.  She is also febrile with a T-max of 101°.  She is admitted for acute on chronic respiratory failure in the setting of influenza infection.      Overview/Hospital Course:  No notes on file    Interval History:  Reported to have coffee-ground emesis and 1 episode of dark stools overnight.  Complains of persistent nausea today.  No appetite.  Looks worn out.  Answers questions appropriately.    Review of Systems   Constitutional:  Positive for activity change, appetite change and fatigue.   Eyes:  Negative for visual disturbance.   Respiratory:  Positive for cough and shortness of breath.    Cardiovascular:  Negative for chest pain and leg swelling.   Gastrointestinal:  Positive for nausea. Negative for abdominal pain, diarrhea and vomiting.   Genitourinary:  Negative for dysuria, frequency and urgency.   Musculoskeletal:  Negative for back pain.   Neurological:  Negative for light-headedness and headaches.   Psychiatric/Behavioral:  Negative for confusion.    Objective:     Vital Signs (Most Recent):  Temp: 96.7 °F (35.9 °C)  (10/16/22 1211)  Pulse: 99 (10/16/22 1211)  Resp: 18 (10/16/22 1211)  BP: (!) 157/74 (10/16/22 1211)  SpO2: 95 % (10/16/22 1211)   Vital Signs (24h Range):  Temp:  [96.6 °F (35.9 °C)-97.7 °F (36.5 °C)] 96.7 °F (35.9 °C)  Pulse:  [] 99  Resp:  [16-22] 18  SpO2:  [92 %-96 %] 95 %  BP: ()/(58-80) 157/74     Weight: 40.8 kg (90 lb)  Body mass index is 16.46 kg/m².    Intake/Output Summary (Last 24 hours) at 10/16/2022 1343  Last data filed at 10/16/2022 1035  Gross per 24 hour   Intake 757.73 ml   Output 1400 ml   Net -642.27 ml        Physical Exam  Vitals and nursing note reviewed.   Constitutional:       General: She is in acute distress (Due to nausea).      Appearance: She is ill-appearing.      Comments: Somnolent again today but answering questions appropriately  Diffuse muscle wasting noted   HENT:      Head: Normocephalic and atraumatic.      Mouth/Throat:      Mouth: Mucous membranes are dry.   Eyes:      General: No scleral icterus.  Cardiovascular:      Rate and Rhythm: Normal rate and regular rhythm.      Pulses: Normal pulses.      Heart sounds: Normal heart sounds. No murmur heard.  Pulmonary:      Effort: Pulmonary effort is normal. No respiratory distress.      Breath sounds: No wheezing.      Comments: Decreased breath sounds bilaterally  Abdominal:      Palpations: Abdomen is soft.      Tenderness: There is abdominal tenderness (Mild epigastric tenderness (soreness per patient from nausea/vomiting)).   Musculoskeletal:      Cervical back: Neck supple.      Right lower leg: No edema.      Left lower leg: No edema.   Skin:     General: Skin is warm and dry.      Findings: No bruising or rash.   Neurological:      Mental Status: She is oriented to person, place, and time.      Comments: Moves all extremities voluntarily   Psychiatric:         Mood and Affect: Mood normal.       Significant Labs: All pertinent labs within the past 24 hours have been reviewed.    Significant Imaging: I have  reviewed all pertinent imaging results/findings within the past 24 hours.      Assessment/Plan:      * Acute on chronic respiratory failure  Likely secondary to influenza infection.  Vaccinated for influenza about 4 months ago.  Tamiflu X 5 days    Will complete 5 days of antibiotics (cefepime/vancomycin X 3 days followed by ceftriaxone X 2 days) for suspected secondary bacterial infection given hypotension.  Blood cultures negative.    Continue scheduled inhalers    Reported to be on 2 L oxygen at baseline for COPD, confirmed with patient's .  Wean oxygen as tolerated.      Hypotension  Hypotension with systolics in the 60s noted on 10/12  Transiently responded to 1 L IV fluid bolus and was placed on Levophed for a few hours, now off pressors     Blood cultures show no growth till date.  Suspect infectious/sepsis picture.  Will complete 5 days of antibiotics for presumed secondary bacterial infection.  Echocardiogram showed preserved LVEF.      Chronic respiratory failure  On 3 L oxygen at baseline for COPD      Influenza A virus present  will treat influenza with a five-day course of Tamiflu.          ESRD (end stage renal disease) on dialysis  Follows with Dr. Diggs. Nephrology consulted      Nausea  Persistent refractory nausea.  Reported episode of coffee-ground emesis, dark stools overnight.  Zofran/Phenergan ineffective.  Trial of Compazine.  Start IV PPI b.i.d..  Prophylactic chemical DVT prophylaxis on hold.  H&H stable.  Hemoglobin stable.  CT head to rule out central etiology for nausea negative for acute intracranial abnormality.  CT abdomen pelvis with IV contrast ordered.  Patient unable to tolerate p.o. contrast.  Clear liquid diet. GI consult.      VTE Risk Mitigation (From admission, onward)           Ordered     heparin (porcine) injection 4,100 Units  As needed (PRN)         10/12/22 1215     IP VTE HIGH RISK PATIENT  Once         10/11/22 2039     Place sequential compression device  Until  discontinued         10/11/22 2039                    Discharge Planning   LAITH:      Code Status: Full Code   Is the patient medically ready for discharge?:     Reason for patient still in hospital (select all that apply): Patient new problem, Patient trending condition and Consult recommendations  Discharge Plan A: Home Health          Andreia Salazar MD  Department of [facility] Medicine   [facility]

## 2022-10-16 NOTE — ASSESSMENT & PLAN NOTE
Hypotension with systolics in the 60s noted on 10/12  Transiently responded to 1 L IV fluid bolus and was placed on Levophed for a few hours, now off pressors     Blood cultures show no growth till date.  Suspect infectious/sepsis picture.  Will complete 5 days of antibiotics for presumed secondary bacterial infection.  Echocardiogram showed preserved LVEF.

## 2022-10-16 NOTE — PROGRESS NOTES
Nephrology Progress Note       Consult Requested By: Andreia Salazar MD  Reason for Consult: ESRD     SUBJECTIVE:        Review of Systems   Constitutional:  Positive for malaise/fatigue. Negative for chills and fever.   HENT:  Negative for congestion and sore throat.    Eyes:  Negative for blurred vision, double vision and photophobia.   Respiratory:  Positive for cough and shortness of breath.    Cardiovascular:  Negative for chest pain, palpitations and leg swelling.   Gastrointestinal:  Negative for abdominal pain, diarrhea, nausea and vomiting.   Genitourinary:  Negative for dysuria and urgency.   Musculoskeletal:  Negative for joint pain and myalgias.   Skin:  Negative for itching and rash.   Neurological:  Negative for dizziness, sensory change, weakness and headaches.   Endo/Heme/Allergies:  Negative for polydipsia. Does not bruise/bleed easily.   Psychiatric/Behavioral:  Negative for depression.      Past Medical History:   Diagnosis Date    Acute congestive heart failure 02/10/2020    Anemia     Bilateral renal cysts     Cataract     Chronic LBP 7/26/2012    Chronic pain     CKD (chronic kidney disease), stage IV     Colon polyp 2013    COPD (chronic obstructive pulmonary disease)     Dehydration     Encounter for blood transfusion     HTN (hypertension)     Lumbar spondylosis     Melanoma     of the lip    Metabolic bone disease     Migraines, neuralgic     Osteoporosis     Primary osteoarthritis of both knees     s/p Rt TKA    Pulmonary embolism with infarction     Seizures 1972    x1 only    Subdeltoid bursitis, L>R. 9/27/2012    Ulcer     Vitamin D deficiency disease          OBJECTIVE:     Vital Signs (Most Recent)  Vitals:    10/16/22 0408 10/16/22 0444 10/16/22 0702 10/16/22 0715   BP:  (!) 150/78 (!) 157/72    BP Location:  Left leg     Patient Position:   Lying    Pulse: 97 97 101 99   Resp:  20 18 16   Temp:  97.4 °F (36.3 °C) 96.6 °F (35.9 °C)    TempSrc:  Oral Oral    SpO2:  95% (!) 94% 95%    Weight:       Height:                 Medications:          Physical Exam  Vitals and nursing note reviewed.   Constitutional:       General: She is not in acute distress.     Appearance: She is ill-appearing. She is not diaphoretic.   HENT:      Head: Normocephalic and atraumatic.      Mouth/Throat:      Pharynx: No oropharyngeal exudate.   Eyes:      General: No scleral icterus.     Conjunctiva/sclera: Conjunctivae normal.      Pupils: Pupils are equal, round, and reactive to light.   Cardiovascular:      Rate and Rhythm: Normal rate and regular rhythm.      Heart sounds: Normal heart sounds. No murmur heard.  Pulmonary:      Effort: Pulmonary effort is normal. No respiratory distress.      Breath sounds: Wheezing present. No rales.   Abdominal:      General: Bowel sounds are normal. There is no distension.      Palpations: Abdomen is soft.      Tenderness: There is no abdominal tenderness.   Musculoskeletal:         General: Normal range of motion.      Cervical back: Normal range of motion and neck supple.      Comments: AVF     Skin:     General: Skin is warm and dry.      Findings: No erythema.   Neurological:      Mental Status: She is alert and oriented to person, place, and time.      Cranial Nerves: No cranial nerve deficit.   Psychiatric:         Mood and Affect: Affect normal.         Cognition and Memory: Memory normal.         Judgment: Judgment normal.       Laboratory:  Recent Labs   Lab 10/13/22  0454 10/14/22  0443 10/16/22  0418   WBC 3.18* 3.25* 3.84*   HGB 11.1* 12.9 13.0   HCT 35.3* 40.3 44.1   * 123* 101*   MONO 13.2  0.4 15.7*  0.5 1.0*       Recent Labs   Lab 10/13/22  0454 10/14/22  0443 10/15/22  0259 10/16/22  0418   *  132* 136 137 133*  133*   K 3.9  3.9 3.9 3.5 4.7  4.7     104 99 101 108  108   CO2 21*  21* 24 27 17*  17*   BUN 21  21 17 21 11  11   CREATININE 1.8*  1.8* 1.7* 2.2* 1.4  1.4   CALCIUM 7.9*  7.9* 8.5* 8.2* 8.7  8.7   PHOS 2.9  --   2.4* 1.5*         Diagnostic Results:  X-Ray: Reviewed  US: Reviewed  Echo: Reviewed  ASSESSMENT/PLAN:     1. ESRD  - HD MWF [facility] With Dr Aura Diggs   --  plan for HD  Monday   Phos low  has Poor PO intake - stop Sevelamer   -- Keep MWF after That   -- Daily Renal Function Panel  -- Avoid Hypotension.  -- Renally dose all meds  2. HTN (I10) -  Controlled   3. Anemia of chronic kidney disease treated with BILL (N18.9 D63.1) -      Recent Labs   Lab 10/13/22  0454 10/14/22  0443 10/16/22  0418   HGB 11.1* 12.9 13.0   HCT 35.3* 40.3 44.1   * 123* 101*         Iron   Lab Results   Component Value Date    IRON 13 (L) 02/12/2020    TIBC 462 (H) 02/12/2020    FERRITIN 148 07/17/2019       4. MBD (E88.9 M90.80) -  Recent Labs   Lab 10/16/22  0418   CALCIUM 8.7  8.7   PHOS 1.5*       No results for input(s): MG in the last 168 hours.    Lab Results   Component Value Date    .0 (H) 12/28/2018    CALCIUM 8.7 10/16/2022    CALCIUM 8.7 10/16/2022    PHOS 1.5 (L) 10/16/2022     Lab Results   Component Value Date    PZDIRGLA43SE 26 (L) 02/12/2020     Sevelamer   Lab Results   Component Value Date    CO2 17 (L) 10/16/2022    CO2 17 (L) 10/16/2022       5. Nutrition/Hypoalbuminemia (E88.09) -   Recent Labs   Lab 10/15/22  0259 10/16/22  0418   ALBUMIN 2.5* 2.8*       Nepro with meals TID. Renal vitamins daily      Thank you for the consult, will follow  With any question please call [phone]  Ramo Da Silva MD    [facility]  EDGARD Bustillos MD, FACP,   NEIL Diggs MD,   MD CURTIS Benoit MD E. V. Harmon, NP    [address]  [place], [address] [phone]

## 2022-10-16 NOTE — CONSULTS
"[facility]    CC: dark emesis    HPI 77 y.o. female with PMH significant for ESRD on HD MWF, chronic respiratory failure GI consulted for acute on chronic, 1 time episode, non-bloody, painless, non-forceful dark emesis patient describes as "normal" appearing emesis in setting of influenza infection.    She has had emesis in the past when she "does not feel well" but has never had bloody emesis in the past.    Past Medical History  Past Medical History:   Diagnosis Date    Acute congestive heart failure 02/10/2020    Anemia     Bilateral renal cysts     Cataract     Chronic LBP 7/26/2012    Chronic pain     CKD (chronic kidney disease), stage IV     Colon polyp 2013    COPD (chronic obstructive pulmonary disease)     Dehydration     Encounter for blood transfusion     HTN (hypertension)     Lumbar spondylosis     Melanoma     of the lip    Metabolic bone disease     Migraines, neuralgic     Osteoporosis     Primary osteoarthritis of both knees     s/p Rt TKA    Pulmonary embolism with infarction     Seizures 1972    x1 only    Subdeltoid bursitis, L>R. 9/27/2012    Ulcer     Vitamin D deficiency disease        Past Surgical History  Past Surgical History:   Procedure Laterality Date    BLADDER SUSPENSION      CATARACT EXTRACTION  11/18/13    left eye    CERVICAL LAMINECTOMY      x3, fusion x1    COLONOSCOPY  2009    DECLOTTING OF ARTERIOVENOUS GRAFT Left 1/3/2022    Procedure: [id number];  Surgeon: Lindsey Louie MD;  Location: [facility] OR;  Service: Vascular;  Laterality: Left;    DECLOTTING OF ARTERIOVENOUS GRAFT Left 2/8/2022    Procedure: [id number];  Surgeon: Lindsey Louie MD;  Location: [facility] OR;  Service: Vascular;  Laterality: Left;    DECLOTTING OF ARTERIOVENOUS GRAFT Left 4/8/2022    Procedure: BYSNTPISUO-AMQVR-UY;  Surgeon: Lindsey Louie MD;  Location: [facility] OR;  Service: Vascular;  Laterality: Left;    FISTULOGRAM N/A 2/27/2020    Procedure: Fistulogram;  Surgeon: " Noe Benitez MD;  Location: [facility] CATH LAB/EP;  Service: Cardiology;  Laterality: N/A;    HYSTERECTOMY      JOINT REPLACEMENT  2001    total right knee     LUMBAR LAMINECTOMY      x 3, fusion x1    OOPHORECTOMY      PERIPHERAL ANGIOGRAPHY N/A 3/9/2020    Procedure: Peripheral angiography;  Surgeon: Jacinto Henriquez MD;  Location: [facility] CATH LAB/EP;  Service: Cardiology;  Laterality: N/A;    PLACEMENT OF ARTERIOVENOUS GRAFT Left 1/21/2020    Procedure: INSERTION, GRAFT, ARTERIOVENOUS;  Surgeon: Lindsey Louie MD;  Location: [facility] OR;  Service: General;  Laterality: Left;    PLACEMENT OF ARTERIOVENOUS GRAFT Right 7/22/2022    Procedure: INSERTION, GRAFT, ARTERIOVENOUS;  Surgeon: Lindsey Louie MD;  Location: [facility];  Service: General;  Laterality: Right;    PLACEMENT OF DUAL-LUMEN VASCULAR CATHETER Right 4/16/2022    Procedure: INSERTION-CATHETER-RICKI;  Surgeon: Lindsey Louie MD;  Location: [facility];  Service: General;  Laterality: Right;    THROMBECTOMY Left 2/3/2020    Procedure: THROMBECTOMY;  Surgeon: Lindsey Louie MD;  Location: [facility] OR;  Service: General;  Laterality: Left;    THROMBECTOMY  3/9/2020    Procedure: Thrombectomy;  Surgeon: Jacinto Henriquez MD;  Location: [facility] CATH LAB/EP;  Service: Cardiology;;    THROMBECTOMY Left 4/7/2022    Procedure: THROMBECTOMY;  Surgeon: Lindsey Louie MD;  Location: [facility];  Service: General;  Laterality: Left;       Social History  Social History     Tobacco Use    Smoking status: Former     Types: Cigarettes    Smokeless tobacco: Former     Quit date: 2/3/2015   Substance Use Topics    Alcohol use: Not Currently     Comment: Rare    Drug use: No       Family History  Denies CRC    Review of Systems  General ROS: +fatigue. negative for chills, fever or weight loss  Psychological ROS: negative for hallucination, depression, anxiety, or suicidal ideation  Ophthalmic ROS: negative for blurry vision, photophobia or eye pain  ENT ROS:  "negative for epistaxis, sore throat or rhinorrhea  Respiratory ROS: +cough, shortness of breath. No wheezing  Cardiovascular ROS: +dyspnea on exertion. no chest pain  Gastrointestinal ROS: +nausea, emesis. No abdominal pain, change in bowel habits, melena, hematochezia, hematemesis  Musculoskeletal ROS: +generalized weakness. Negative for gait disturbance  Neurological ROS: no syncope or seizures; no ataxia  Dermatological ROS: negative for pruritis, rash and jaundice    Physical Examination  BP (!) 157/74 (Patient Position: Lying)   Pulse 99   Temp 96.7 °F (35.9 °C) (Oral)   Resp 18   Ht 5' 2" (1.575 m)   Wt 40.8 kg (90 lb)   LMP  (LMP Unknown)   SpO2 95%   BMI 16.46 kg/m²   General appearance: alert, cooperative, no distress  HENT: Normocephalic, atraumatic, neck symmetrical, no nasal discharge   Eyes: no scleral icterus, PERRL, EOM's intact  Lungs: labored breathing, no dullness to percussion bilaterally  Heart: regular rate and without rub; no displacement of the PMI   Abdomen: soft, non-tender; non-distended, dullness to percussion, bowel sounds normoactive; no organomegaly  Extremities: extremities symmetric; no clubbing, cyanosis  Integument: color, texture, turgor normal; no rashes; hair distrubution normal  Neurologic: Alert and oriented X 3, normal sensation, normal coordination  Psychiatric: no pressured speech; normal affect; no evidence of impaired cognition     Recent Labs   Lab 10/16/22  0418   WBC 3.84*   RBC 4.21   HGB 13.0   HCT 44.1   *   *   MCH 30.9   MCHC 29.5*       Recent Labs   Lab 10/16/22  0418   *  133*   K 4.7  4.7     108   CO2 17*  17*   GLU 75  75   BUN 11  11   CREATININE 1.4  1.4     Recent Labs   Lab 10/16/22  0418   ALBUMIN 2.8*       Reviewed CT A/P with large distended stomach and splenomegaly per my view    Review and summarization of old records    Assessment:   77 y.o. female with PMH significant for ESRD on HD MWF, chronic respiratory " failure admitted for hypoxic respiratory failure in setting of flu. Endo: colonoscopy 2013 with diverticulosis 1 10mm polyp, IH, advised 5 yrs. GI consulted for:    #Emesis  #Gastric distension - noted on CT imaging; possible partial GOO    Hb 13.    Plan:   - plan for EGD tomorrow  - NPO at [facility]  - IV/PO PPID BID    Attestation to follow which may differ from above plan. Please appreciate.    Mio Whiteside MD  [facility]

## 2022-10-16 NOTE — PLAN OF CARE
Patient received on   1 Lpm NC with SpO2   95 %. Pt with no apparent distress noted. Will continue to monitor.

## 2022-10-16 NOTE — NURSING
Notified house supervisor and ICU charge of patients status and coffee ground emesis with one episode of dark diarrhea.  Will continue to monitor

## 2022-10-16 NOTE — ASSESSMENT & PLAN NOTE
Persistent refractory nausea.  Reported episode of coffee-ground emesis, dark stools overnight.  Zofran/Phenergan ineffective.  Trial of Compazine.  Start IV PPI b.i.d..  Prophylactic chemical DVT prophylaxis on hold.  H&H stable.  Hemoglobin stable.  CT head to rule out central etiology for nausea negative for acute intracranial abnormality.  Clear liquid diet. GI consult.

## 2022-10-17 PROBLEM — K92.0 HEMATEMESIS OF UNKNOWN CAUSE: Status: ACTIVE | Noted: 2022-10-17

## 2022-10-17 LAB
ALBUMIN SERPL BCP-MCNC: 2.8 G/DL (ref 3.5–5.2)
ANION GAP SERPL CALC-SCNC: 8 MMOL/L (ref 8–16)
ANISOCYTOSIS BLD QL SMEAR: SLIGHT
BASOPHILS NFR BLD: 0 % (ref 0–1.9)
BUN SERPL-MCNC: 15 MG/DL (ref 8–23)
CALCIUM SERPL-MCNC: 8.7 MG/DL (ref 8.7–10.5)
CHLORIDE SERPL-SCNC: 110 MMOL/L (ref 95–110)
CO2 SERPL-SCNC: 15 MMOL/L (ref 23–29)
CREAT SERPL-MCNC: 1.9 MG/DL (ref 0.5–1.4)
DACRYOCYTES BLD QL SMEAR: ABNORMAL
DIFFERENTIAL METHOD: ABNORMAL
EOSINOPHIL NFR BLD: 1 % (ref 0–8)
ERYTHROCYTE [DISTWIDTH] IN BLOOD BY AUTOMATED COUNT: 13.6 % (ref 11.5–14.5)
EST. GFR  (NO RACE VARIABLE): 27 ML/MIN/1.73 M^2
GIANT PLATELETS BLD QL SMEAR: PRESENT
GLUCOSE SERPL-MCNC: 66 MG/DL (ref 70–110)
HCT VFR BLD AUTO: 39.2 % (ref 37–48.5)
HGB BLD-MCNC: 12.2 G/DL (ref 12–16)
IMM GRANULOCYTES # BLD AUTO: ABNORMAL K/UL
IMM GRANULOCYTES NFR BLD AUTO: ABNORMAL %
LYMPHOCYTES NFR BLD: 29 % (ref 18–48)
MCH RBC QN AUTO: 30.9 PG (ref 27–31)
MCHC RBC AUTO-ENTMCNC: 31.1 G/DL (ref 32–36)
MCV RBC AUTO: 99 FL (ref 82–98)
MONOCYTES NFR BLD: 7 % (ref 4–15)
MYELOCYTES NFR BLD MANUAL: 2 %
NEUTROPHILS NFR BLD: 61 % (ref 38–73)
NRBC BLD-RTO: 0 /100 WBC
OVALOCYTES BLD QL SMEAR: ABNORMAL
PHOSPHATE SERPL-MCNC: 1.8 MG/DL (ref 2.7–4.5)
PLATELET # BLD AUTO: 119 K/UL (ref 150–450)
PLATELET BLD QL SMEAR: ABNORMAL
PMV BLD AUTO: 10.6 FL (ref 9.2–12.9)
POIKILOCYTOSIS BLD QL SMEAR: SLIGHT
POLYCHROMASIA BLD QL SMEAR: ABNORMAL
POTASSIUM SERPL-SCNC: 4.8 MMOL/L (ref 3.5–5.1)
RBC # BLD AUTO: 3.95 M/UL (ref 4–5.4)
SMUDGE CELLS BLD QL SMEAR: PRESENT
SODIUM SERPL-SCNC: 133 MMOL/L (ref 136–145)
SPHEROCYTES BLD QL SMEAR: ABNORMAL
WBC # BLD AUTO: 5.58 K/UL (ref 3.9–12.7)

## 2022-10-17 PROCEDURE — 85027 COMPLETE CBC AUTOMATED: CPT | Performed by: STUDENT IN AN ORGANIZED HEALTH CARE EDUCATION/TRAINING PROGRAM

## 2022-10-17 PROCEDURE — 99223 1ST HOSP IP/OBS HIGH 75: CPT | Mod: ,,,

## 2022-10-17 PROCEDURE — 99223 PR INITIAL HOSPITAL CARE,LEVL III: ICD-10-PCS | Mod: ,,,

## 2022-10-17 PROCEDURE — 85007 BL SMEAR W/DIFF WBC COUNT: CPT | Performed by: STUDENT IN AN ORGANIZED HEALTH CARE EDUCATION/TRAINING PROGRAM

## 2022-10-17 PROCEDURE — 63600175 PHARM REV CODE 636 W HCPCS: Performed by: STUDENT IN AN ORGANIZED HEALTH CARE EDUCATION/TRAINING PROGRAM

## 2022-10-17 PROCEDURE — 94761 N-INVAS EAR/PLS OXIMETRY MLT: CPT

## 2022-10-17 PROCEDURE — 99900035 HC TECH TIME PER 15 MIN (STAT)

## 2022-10-17 PROCEDURE — 99497 PR ADVNCD CARE PLAN 30 MIN: ICD-10-PCS | Mod: 25,,,

## 2022-10-17 PROCEDURE — 94640 AIRWAY INHALATION TREATMENT: CPT

## 2022-10-17 PROCEDURE — 99233 PR SUBSEQUENT HOSPITAL CARE,LEVL III: ICD-10-PCS | Mod: ,,, | Performed by: INTERNAL MEDICINE

## 2022-10-17 PROCEDURE — 25000003 PHARM REV CODE 250: Performed by: STUDENT IN AN ORGANIZED HEALTH CARE EDUCATION/TRAINING PROGRAM

## 2022-10-17 PROCEDURE — 36415 COLL VENOUS BLD VENIPUNCTURE: CPT | Performed by: STUDENT IN AN ORGANIZED HEALTH CARE EDUCATION/TRAINING PROGRAM

## 2022-10-17 PROCEDURE — 99497 ADVNCD CARE PLAN 30 MIN: CPT | Mod: 25,,,

## 2022-10-17 PROCEDURE — C9113 INJ PANTOPRAZOLE SODIUM, VIA: HCPCS | Performed by: STUDENT IN AN ORGANIZED HEALTH CARE EDUCATION/TRAINING PROGRAM

## 2022-10-17 PROCEDURE — 27000221 HC OXYGEN, UP TO 24 HOURS

## 2022-10-17 PROCEDURE — 99233 SBSQ HOSP IP/OBS HIGH 50: CPT | Mod: ,,, | Performed by: INTERNAL MEDICINE

## 2022-10-17 PROCEDURE — 27000207 HC ISOLATION

## 2022-10-17 PROCEDURE — 11000001 HC ACUTE MED/SURG PRIVATE ROOM

## 2022-10-17 PROCEDURE — 80100016 HC MAINTENANCE HEMODIALYSIS

## 2022-10-17 PROCEDURE — 25000242 PHARM REV CODE 250 ALT 637 W/ HCPCS: Performed by: INTERNAL MEDICINE

## 2022-10-17 PROCEDURE — 25000003 PHARM REV CODE 250: Performed by: INTERNAL MEDICINE

## 2022-10-17 PROCEDURE — 80069 RENAL FUNCTION PANEL: CPT | Performed by: STUDENT IN AN ORGANIZED HEALTH CARE EDUCATION/TRAINING PROGRAM

## 2022-10-17 RX ORDER — HYDROCODONE BITARTRATE AND ACETAMINOPHEN 5; 325 MG/1; MG/1
1 TABLET ORAL 3 TIMES DAILY PRN
Status: DISCONTINUED | OUTPATIENT
Start: 2022-10-17 | End: 2022-10-20 | Stop reason: HOSPADM

## 2022-10-17 RX ORDER — LORAZEPAM 0.5 MG/1
0.5 TABLET ORAL ONCE
Status: COMPLETED | OUTPATIENT
Start: 2022-10-17 | End: 2022-10-17

## 2022-10-17 RX ORDER — HYDROCODONE BITARTRATE AND ACETAMINOPHEN 5; 325 MG/1; MG/1
1 TABLET ORAL EVERY 8 HOURS PRN
Status: DISCONTINUED | OUTPATIENT
Start: 2022-10-17 | End: 2022-10-17

## 2022-10-17 RX ADMIN — PANTOPRAZOLE SODIUM 40 MG: 40 INJECTION, POWDER, FOR SOLUTION INTRAVENOUS at 09:10

## 2022-10-17 RX ADMIN — PROMETHAZINE HYDROCHLORIDE 12.5 MG: 25 INJECTION INTRAMUSCULAR; INTRAVENOUS at 10:10

## 2022-10-17 RX ADMIN — ATORVASTATIN CALCIUM 80 MG: 40 TABLET, FILM COATED ORAL at 09:10

## 2022-10-17 RX ADMIN — PANTOPRAZOLE SODIUM 40 MG: 40 INJECTION, POWDER, FOR SOLUTION INTRAVENOUS at 08:10

## 2022-10-17 RX ADMIN — HEPARIN SODIUM 4100 UNITS: 1000 INJECTION, SOLUTION INTRAVENOUS; SUBCUTANEOUS at 05:10

## 2022-10-17 RX ADMIN — DULOXETINE 30 MG: 30 CAPSULE, DELAYED RELEASE ORAL at 09:10

## 2022-10-17 RX ADMIN — MIRTAZAPINE 15 MG: 7.5 TABLET ORAL at 08:10

## 2022-10-17 RX ADMIN — BUSPIRONE HYDROCHLORIDE 10 MG: 5 TABLET ORAL at 09:10

## 2022-10-17 RX ADMIN — IPRATROPIUM BROMIDE AND ALBUTEROL SULFATE 3 ML: 2.5; .5 SOLUTION RESPIRATORY (INHALATION) at 01:10

## 2022-10-17 RX ADMIN — PROCHLORPERAZINE EDISYLATE 5 MG: 5 INJECTION INTRAMUSCULAR; INTRAVENOUS at 05:10

## 2022-10-17 RX ADMIN — HYDROCODONE BITARTRATE AND ACETAMINOPHEN 1 TABLET: 5; 325 TABLET ORAL at 02:10

## 2022-10-17 RX ADMIN — IPRATROPIUM BROMIDE AND ALBUTEROL SULFATE 3 ML: 2.5; .5 SOLUTION RESPIRATORY (INHALATION) at 08:10

## 2022-10-17 RX ADMIN — SODIUM CHLORIDE 250 ML: 0.9 INJECTION, SOLUTION INTRAVENOUS at 06:10

## 2022-10-17 RX ADMIN — HYDROCODONE BITARTRATE AND ACETAMINOPHEN 1 TABLET: 5; 325 TABLET ORAL at 08:10

## 2022-10-17 RX ADMIN — OSELTAMIVIR PHOSPHATE 30 MG: 30 CAPSULE ORAL at 09:10

## 2022-10-17 RX ADMIN — LORAZEPAM 0.5 MG: 0.5 TABLET ORAL at 06:10

## 2022-10-17 NOTE — CARE UPDATE
Brief update note     Notified by RN patient is tachycardic, anxious and could not tolerate dialysis.      Assist patient at bedside.  Tachycardic with heart rate in the 120s.  Blood pressure 127/60.  Saturating well on 3 L oxygen via NC.  Tachypnea present.  Looks uncomfortable, using accessory muscles to breathe.  Endorses dyspnea and anxiety.  Denies chest pain. has nausea but no vomiting.  Has had very poor p.o. intake today due to nausea though it has improved some with Compazine.    Dry on exam.  Removed 994 cc ultrafiltrate during dialysis.  She also makes some urine.    Will give  cc IV fluid bolus.  If heart rate improves with IV fluids, consider giving another to 50 cc IV fluid bolus as she looks dehydrated on exam which could be contributing to the tachycardia.  Given she has not been on chemical DVT prophylaxis for more than 24 hours in the setting of a suspected upper GI bleed, will get CTA chest to rule out a PE.  Sign out given to follow up on CTA chest.  Ativan 0.5 mg p.o. for anxiety.    Patient's daughter at bedside updated.    Andreia Salazar MD   Internal Medicine Hospitalist    
IMPROVE-DD Assessment completed: Sep 16 2021  1:41PM

## 2022-10-17 NOTE — SUBJECTIVE & OBJECTIVE
Interval History:  Her nausea improved with Compazine.  Has not had any vomiting this morning.  More awake this morning.  No shortness of breath at rest but reports having dyspnea on minimal exertion.    Review of Systems   Constitutional:  Positive for activity change, appetite change and fatigue.   Eyes:  Negative for visual disturbance.   Respiratory:  Positive for cough and shortness of breath.    Cardiovascular:  Negative for chest pain and leg swelling.   Gastrointestinal:  Positive for nausea. Negative for abdominal pain, diarrhea and vomiting.   Genitourinary:  Negative for dysuria, frequency and urgency.   Musculoskeletal:  Negative for back pain.   Neurological:  Negative for light-headedness and headaches.   Psychiatric/Behavioral:  Negative for confusion.    Objective:     Vital Signs (Most Recent):  Temp: 97.5 °F (36.4 °C) (10/17/22 1430)  Pulse: (!) 122 (10/17/22 1609)  Resp: (!) 21 (10/17/22 1430)  BP: (!) 148/87 (10/17/22 1536)  SpO2: 97 % (10/17/22 1313)   Vital Signs (24h Range):  Temp:  [96 °F (35.6 °C)-99.2 °F (37.3 °C)] 97.5 °F (36.4 °C)  Pulse:  [] 122  Resp:  [16-22] 21  SpO2:  [95 %-97 %] 97 %  BP: (120-157)/(64-97) 148/87     Weight: 40.8 kg (90 lb)  Body mass index is 16.46 kg/m².    Intake/Output Summary (Last 24 hours) at 10/17/2022 1617  Last data filed at 10/17/2022 0552  Gross per 24 hour   Intake 492.98 ml   Output 200 ml   Net 292.98 ml        Physical Exam  Vitals and nursing note reviewed.   Constitutional:       General: She is not in acute distress.     Appearance: She is ill-appearing.      Comments: Elderly, frail appearing  Diffuse muscle wasting noted   HENT:      Head: Normocephalic and atraumatic.      Mouth/Throat:      Mouth: Mucous membranes are dry.   Eyes:      General: No scleral icterus.  Cardiovascular:      Rate and Rhythm: Normal rate and regular rhythm.      Pulses: Normal pulses.      Heart sounds: Normal heart sounds. No murmur heard.  Pulmonary:       Effort: Pulmonary effort is normal. No respiratory distress.      Breath sounds: No wheezing.      Comments: Decreased breath sounds bilaterally  Abdominal:      Palpations: Abdomen is soft.      Tenderness: There is no abdominal tenderness.   Musculoskeletal:      Cervical back: Neck supple.      Right lower leg: No edema.      Left lower leg: No edema.   Skin:     General: Skin is warm and dry.      Findings: No bruising or rash.   Neurological:      Mental Status: She is alert and oriented to person, place, and time.      Comments: Moves all extremities voluntarily   Psychiatric:         Mood and Affect: Mood normal.       Significant Labs: All pertinent labs within the past 24 hours have been reviewed.    Significant Imaging: I have reviewed all pertinent imaging results/findings within the past 24 hours.

## 2022-10-17 NOTE — SUBJECTIVE & OBJECTIVE
Interval History: EGD cancelled for today, per pt due to her breathing.  Pt reports nausea is slightly improved     Past Medical History:   Diagnosis Date    Acute congestive heart failure 02/10/2020    Anemia     Bilateral renal cysts     Cataract     Chronic LBP 7/26/2012    Chronic pain     CKD (chronic kidney disease), stage IV     Colon polyp 2013    COPD (chronic obstructive pulmonary disease)     Dehydration     Encounter for blood transfusion     HTN (hypertension)     Lumbar spondylosis     Melanoma     of the lip    Metabolic bone disease     Migraines, neuralgic     Osteoporosis     Primary osteoarthritis of both knees     s/p Rt TKA    Pulmonary embolism with infarction     Seizures 1972    x1 only    Subdeltoid bursitis, L>R. 9/27/2012    Ulcer     Vitamin D deficiency disease        Past Surgical History:   Procedure Laterality Date    BLADDER SUSPENSION      CATARACT EXTRACTION  11/18/13    left eye    CERVICAL LAMINECTOMY      x3, fusion x1    COLONOSCOPY  2009    DECLOTTING OF ARTERIOVENOUS GRAFT Left 1/3/2022    Procedure: [id number]-QI;  Surgeon: Lindsey Louie MD;  Location: [facility] OR;  Service: Vascular;  Laterality: Left;    DECLOTTING OF ARTERIOVENOUS GRAFT Left 2/8/2022    Procedure: [id number]-DBTIY-WF;  Surgeon: Lindsey Louie MD;  Location: [facility] OR;  Service: Vascular;  Laterality: Left;    DECLOTTING OF ARTERIOVENOUS GRAFT Left 4/8/2022    Procedure: AQVDIPONOR-FARMK-LQ;  Surgeon: Lindsey Louie MD;  Location: [facility] OR;  Service: Vascular;  Laterality: Left;    FISTULOGRAM N/A 2/27/2020    Procedure: Fistulogram;  Surgeon: Noe Benitez MD;  Location: [facility] CATH LAB/EP;  Service: Cardiology;  Laterality: N/A;    HYSTERECTOMY      JOINT REPLACEMENT  2001    total right knee     LUMBAR LAMINECTOMY      x 3, fusion x1    OOPHORECTOMY      PERIPHERAL ANGIOGRAPHY N/A 3/9/2020    Procedure: Peripheral angiography;  Surgeon: Jacinto  MD Martinez;  Location: [facility] CATH LAB/EP;  Service: Cardiology;  Laterality: N/A;    PLACEMENT OF ARTERIOVENOUS GRAFT Left 1/21/2020    Procedure: INSERTION, GRAFT, ARTERIOVENOUS;  Surgeon: Lindsey Louie MD;  Location: [facility] OR;  Service: General;  Laterality: Left;    PLACEMENT OF ARTERIOVENOUS GRAFT Right 7/22/2022    Procedure: INSERTION, GRAFT, ARTERIOVENOUS;  Surgeon: Lindsey Louie MD;  Location: [facility] OR;  Service: General;  Laterality: Right;    PLACEMENT OF DUAL-LUMEN VASCULAR CATHETER Right 4/16/2022    Procedure: INSERTION-CATHETER-RICKI;  Surgeon: Lindsey Louie MD;  Location: [facility] OR;  Service: General;  Laterality: Right;    THROMBECTOMY Left 2/3/2020    Procedure: THROMBECTOMY;  Surgeon: Lindsey Louie MD;  Location: [facility] OR;  Service: General;  Laterality: Left;    THROMBECTOMY  3/9/2020    Procedure: Thrombectomy;  Surgeon: Jacinto Henriquez MD;  Location: [facility] CATH LAB/EP;  Service: Cardiology;;    THROMBECTOMY Left 4/7/2022    Procedure: THROMBECTOMY;  Surgeon: Lindsey Louie MD;  Location: [facility] OR;  Service: General;  Laterality: Left;       Review of patient's allergies indicates:   Allergen Reactions    Aspirin      Other reaction(s): hx of ulcers    Tetracycline Swelling     Other reaction(s): Swelling    Penicillins Rash     Other reaction(s): Hives  Other reaction(s): Rash  Other reaction(s): Rash  Other reaction(s): Hives       Medications:  Continuous Infusions:  Scheduled Meds:   albuterol-ipratropium  3 mL Nebulization Q6H WAKE    atorvastatin  80 mg Oral Daily    busPIRone  10 mg Oral Daily    DULoxetine  30 mg Oral Daily    mirtazapine  15 mg Oral QHS    oseltamivir  30 mg Oral Every Mon, Wed, Fri    pantoprazole  40 mg Intravenous BID    senna-docusate 8.6-50 mg  1 tablet Oral BID    sodium chloride 0.9%  250 mL Intravenous Once     PRN Meds:sodium chloride 0.9%, acetaminophen, diphenhydrAMINE, glucagon (human recombinant), glucose,  glucose, heparin (porcine), iohexoL, melatonin, naloxone, prochlorperazine, promethazine (PHENERGAN) IVPB, simethicone, sodium chloride 0.9%, sodium chloride 0.9%    Family History       Problem Relation (Age of Onset)    Arthritis Mother    Cancer Father    Cataracts Father, Sister    Diabetes Maternal Aunt    Glaucoma Cousin    Heart attack Maternal Grandfather    Heart disease Maternal Grandfather    Hypertension Father, Maternal Grandfather    Stroke Mother          Tobacco Use    Smoking status: Former     Types: Cigarettes    Smokeless tobacco: Former     Quit date: 2/3/2015   Substance and Sexual Activity    Alcohol use: Not Currently     Comment: Rare    Drug use: No    Sexual activity: Never     Partners: Male       Review of Systems   Constitutional:  Positive for activity change, appetite change, chills, fatigue and fever.   HENT:  Positive for congestion.    Respiratory:  Positive for cough and shortness of breath.    Cardiovascular:  Negative for chest pain.   Gastrointestinal:  Positive for nausea and vomiting.   Endocrine: Negative.    Genitourinary: Negative.    Musculoskeletal: Negative.    Neurological:  Positive for weakness.   Psychiatric/Behavioral: Negative.     Objective:     Vital Signs (Most Recent):  Temp: 96 °F (35.6 °C) (10/17/22 0748)  Pulse: 105 (10/17/22 0845)  Resp: (!) 22 (10/17/22 0845)  BP: 136/88 (10/17/22 0748)  SpO2: 95 % (10/17/22 0900)   Vital Signs (24h Range):  Temp:  [96 °F (35.6 °C)-99.2 °F (37.3 °C)] 96 °F (35.6 °C)  Pulse:  [] 105  Resp:  [16-22] 22  SpO2:  [95 %-97 %] 95 %  BP: (133-157)/(64-88) 136/88     Weight: 40.8 kg (90 lb)  Body mass index is 16.46 kg/m².    Physical Exam  Vitals and nursing note reviewed. Exam conducted with a chaperone present.   Constitutional:       Appearance: She is well-groomed. She is cachectic. She is ill-appearing.   HENT:      Head: Normocephalic.      Nose: Nose normal.      Mouth/Throat:      Mouth: Mucous membranes are  moist.   Cardiovascular:      Rate and Rhythm: Tachycardia present.      Pulses: Normal pulses.   Pulmonary:      Breath sounds: Decreased breath sounds present. No wheezing.      Comments: WOB with conversation   Abdominal:      General: Abdomen is flat.      Palpations: Abdomen is soft.      Tenderness: There is abdominal tenderness.   Musculoskeletal:      Right lower leg: No edema.      Left lower leg: No edema.   Skin:     Capillary Refill: Capillary refill takes less than 2 seconds.      Coloration: Skin is pale.      Findings: Bruising present.   Neurological:      Mental Status: She is alert and oriented to person, place, and time.      Motor: Weakness present.   Psychiatric:         Mood and Affect: Mood normal.         Behavior: Behavior normal. Behavior is cooperative.         Thought Content: Thought content normal.       Review of Symptoms      Symptom Assessment (ESAS 0-10 Scale)  Pain:  0  Dyspnea:  3  Anxiety:  0  Nausea:  3  Depression:  0  Anorexia:  0  Fatigue:  2  Insomnia:  0  Restlessness:  0  Agitation:  0     CAM / Delirium:  Negative  Constipation:  Negative      Performance Status:  40    Living Arrangements:  Lives with spouse and Lives in home    Psychosocial/Cultural: Pt lives at home with her .  Dialysis pt.  Pt has 2 grown children.     Spiritual:  F - Aleja and Belief:  Rastafari   I - Importance:  Moderate   C - Community:  Family support   A - Address in Care:  Pastoral visits prn.      Time-Based Charting:  Yes  Chart Review: 17 minutes  Face to Face: 16 minutes  Symptom Assessment: 16 minutes  Coordination of Care: 12 minutes  Discharge Plannin minutes  Advance Care Plannin minutes  Goals of Care: 8 minutes    Total Time Spent: 89 minutes      Advance Care Planning   Advance Directives:   Living Will: No    LaPOST: No    Do Not Resuscitate Status: No    Medical Power of : No      Decision Making:  Patient answered questions  Goals of Care: The patient and  family endorses that what is most important right now is to focus on spending time at home, remaining as independent as possible, symptom/pain control, and improvement in condition but with limits to invasive therapies    Accordingly, we have decided that the best plan to meet the patient's goals includes continuing with treatment       Significant Labs: All pertinent labs within the past 24 hours have been reviewed.  CBC:   Recent Labs   Lab 10/17/22  0317   WBC 5.58   HGB 12.2   HCT 39.2   MCV 99*   *     BMP:  Recent Labs   Lab 10/17/22  0317   GLU 66*   *   K 4.8      CO2 15*   BUN 15   CREATININE 1.9*   CALCIUM 8.7     LFT:  Lab Results   Component Value Date    AST 68 (H) 10/14/2022    ALKPHOS 215 (H) 10/14/2022    BILITOT 0.5 10/14/2022     Albumin:   Albumin   Date Value Ref Range Status   10/17/2022 2.8 (L) 3.5 - 5.2 g/dL Final     Protein:   Total Protein   Date Value Ref Range Status   10/14/2022 6.9 6.0 - 8.4 g/dL Final     Comment:     Specimen moderately hemolyzed     Lactic acid:   Lab Results   Component Value Date    LACTATE 1.4 10/12/2022    LACTATE 1.3 10/11/2022       Significant Imaging: I have reviewed all pertinent imaging results/findings within the past 24 hours.

## 2022-10-17 NOTE — CONSULTS
"[facility]  Palliative Medicine  Consult Note    Patient Name: Katie Quevedo  MRN: [id number]  Admission Date: 10/11/2022  Hospital Length of Stay: 6 days  Code Status: Full Code   Attending Provider: Andreia Salazar MD  Consulting Provider: Pau Vee NP  Primary Care Physician: Kingston Verduzco MD  Principal Problem:Acute on chronic respiratory failure    Patient information was obtained from patient, relative(s), past medical records and primary team.      Inpatient consult to Palliative Care  Consult performed by: Pau Vee NP  Consult ordered by: Andreia Salazar MD  Reason for consult: Goals of care/ Advanced care planning         Assessment/Plan:     Palliative care encounter  At time of initial consult, pt resting in bed with daughter at the bedside.  Pt receptive to palliative visit at this time.  Pt lives at home with her  and is independent of ADLS.  Pt is a dialysis pt and  transports pt as she no longer drives.  Prior to this hospitalization, pt reports being "pretty healthy" and still maintains an active life style.  Pts daughter expresses concern as the pts  also had the flu recently and is very weak himself.  Daughters expresses being overwhelmed and requested information about home help.  Pt is adamant that she return back home after this admission and states that any sort of placement would not be acceptable to the pt.  Home health and home PT/OT discussed.  Given pts high functional status and desire to continue HD, the topic of hospice was not introduced.  Pts  Dre Quevedo is her MPOA. Pt has 2 grown children.     We discussed goals of care. At this point in time, family wishes to focus on aggressive treatment options with the goals of returning the pt back to baseline.  Pt is in agreement with goals.  We discussed code status.  I expressed concerns that if the pt were to require ventilation, there would not be a way to get her off the ventilator due to the " "state of her lungs.  Pt verbalized understanding but states that she "is not ready to make that decision just yet and wants some time to think about it."  Pts remains a full code.  We also discussed a living will.  Form left at the bedside as the pt would like to complete it at home with her . Pt expresses sadness that her  has not been able to see the pt as he is home dealing with his own health issues. Emotional support offered.  Will continue to discuss goals of care.         Thank you for your consult. I will follow-up with patient. Please contact us if you have any additional questions.    Subjective:     HPI:   Per chart, "Katie is a 77 y.o. female with past medical history of CHF, COPD, ESRD, hypertension, migraines, PE who presents with complaint of increased shortness of breath, increased cough compared to usual and fever for the last 2 days.  She states that her  recently was diagnosed with the flu.  She is normally on 3 L of oxygen via nasal cannula at home.  Her pulse ox was reading in the 80s today.  She did breathing treatments this morning which she thought maybe helped a little bit.  In the emergency department she did test positive for influenza.  She is also febrile with a T-max of 101°.  She is admitted for acute on chronic respiratory failure in the setting of influenza infection."       Interval History: EGD cancelled for today, per pt due to her breathing.  Pt reports nausea is slightly improved     Past Medical History:   Diagnosis Date    Acute congestive heart failure 02/10/2020    Anemia     Bilateral renal cysts     Cataract     Chronic LBP 7/26/2012    Chronic pain     CKD (chronic kidney disease), stage IV     Colon polyp 2013    COPD (chronic obstructive pulmonary disease)     Dehydration     Encounter for blood transfusion     HTN (hypertension)     Lumbar spondylosis     Melanoma     of the lip    Metabolic bone disease     Migraines, neuralgic  "    Osteoporosis     Primary osteoarthritis of both knees     s/p Rt TKA    Pulmonary embolism with infarction     Seizures 1972    x1 only    Subdeltoid bursitis, L>R. 9/27/2012    Ulcer     Vitamin D deficiency disease        Past Surgical History:   Procedure Laterality Date    BLADDER SUSPENSION      CATARACT EXTRACTION  11/18/13    left eye    CERVICAL LAMINECTOMY      x3, fusion x1    COLONOSCOPY  2009    DECLOTTING OF ARTERIOVENOUS GRAFT Left 1/3/2022    Procedure: [id number]-IX;  Surgeon: Lindsey Louie MD;  Location: [facility] OR;  Service: Vascular;  Laterality: Left;    DECLOTTING OF ARTERIOVENOUS GRAFT Left 2/8/2022    Procedure: [id number];  Surgeon: Lindsey Louie MD;  Location: [facility] OR;  Service: Vascular;  Laterality: Left;    DECLOTTING OF ARTERIOVENOUS GRAFT Left 4/8/2022    Procedure: HSYHIHPEDN-FVAJG-KU;  Surgeon: Lindsey Louie MD;  Location: [facility] OR;  Service: Vascular;  Laterality: Left;    FISTULOGRAM N/A 2/27/2020    Procedure: Fistulogram;  Surgeon: Noe Benitez MD;  Location: [facility] CATH LAB/EP;  Service: Cardiology;  Laterality: N/A;    HYSTERECTOMY      JOINT REPLACEMENT  2001    total right knee     LUMBAR LAMINECTOMY      x 3, fusion x1    OOPHORECTOMY      PERIPHERAL ANGIOGRAPHY N/A 3/9/2020    Procedure: Peripheral angiography;  Surgeon: Jacinto Henriquez MD;  Location: [facility] CATH LAB/EP;  Service: Cardiology;  Laterality: N/A;    PLACEMENT OF ARTERIOVENOUS GRAFT Left 1/21/2020    Procedure: INSERTION, GRAFT, ARTERIOVENOUS;  Surgeon: Lindsey Louie MD;  Location: [facility] OR;  Service: General;  Laterality: Left;    PLACEMENT OF ARTERIOVENOUS GRAFT Right 7/22/2022    Procedure: INSERTION, GRAFT, ARTERIOVENOUS;  Surgeon: Lindsey Louie MD;  Location: [facility] OR;  Service: General;  Laterality: Right;    PLACEMENT OF DUAL-LUMEN VASCULAR CATHETER Right 4/16/2022    Procedure: INSERTION-CATHETER-RICKI;  Surgeon: Lindsey MILLER  MD Liban;  Location: [facility] OR;  Service: General;  Laterality: Right;    THROMBECTOMY Left 2/3/2020    Procedure: THROMBECTOMY;  Surgeon: Lindsey Louie MD;  Location: [facility] OR;  Service: General;  Laterality: Left;    THROMBECTOMY  3/9/2020    Procedure: Thrombectomy;  Surgeon: Jacinto Henriquez MD;  Location: [facility] CATH LAB/EP;  Service: Cardiology;;    THROMBECTOMY Left 4/7/2022    Procedure: THROMBECTOMY;  Surgeon: Lindsey Louie MD;  Location: [facility] OR;  Service: General;  Laterality: Left;       Review of patient's allergies indicates:   Allergen Reactions    Aspirin      Other reaction(s): hx of ulcers    Tetracycline Swelling     Other reaction(s): Swelling    Penicillins Rash     Other reaction(s): Hives  Other reaction(s): Rash  Other reaction(s): Rash  Other reaction(s): Hives       Medications:  Continuous Infusions:  Scheduled Meds:   albuterol-ipratropium  3 mL Nebulization Q6H WAKE    atorvastatin  80 mg Oral Daily    busPIRone  10 mg Oral Daily    DULoxetine  30 mg Oral Daily    mirtazapine  15 mg Oral QHS    oseltamivir  30 mg Oral Every Mon, Wed, Fri    pantoprazole  40 mg Intravenous BID    senna-docusate 8.6-50 mg  1 tablet Oral BID    sodium chloride 0.9%  250 mL Intravenous Once     PRN Meds:sodium chloride 0.9%, acetaminophen, diphenhydrAMINE, glucagon (human recombinant), glucose, glucose, heparin (porcine), iohexoL, melatonin, naloxone, prochlorperazine, promethazine (PHENERGAN) IVPB, simethicone, sodium chloride 0.9%, sodium chloride 0.9%    Family History       Problem Relation (Age of Onset)    Arthritis Mother    Cancer Father    Cataracts Father, Sister    Diabetes Maternal Aunt    Glaucoma Cousin    Heart attack Maternal Grandfather    Heart disease Maternal Grandfather    Hypertension Father, Maternal Grandfather    Stroke Mother          Tobacco Use    Smoking status: Former     Types: Cigarettes    Smokeless tobacco: Former     Quit date: 2/3/2015    Substance and Sexual Activity    Alcohol use: Not Currently     Comment: Rare    Drug use: No    Sexual activity: Never     Partners: Male       Review of Systems   Constitutional:  Positive for activity change, appetite change, chills, fatigue and fever.   HENT:  Positive for congestion.    Respiratory:  Positive for cough and shortness of breath.    Cardiovascular:  Negative for chest pain.   Gastrointestinal:  Positive for nausea and vomiting.   Endocrine: Negative.    Genitourinary: Negative.    Musculoskeletal: Negative.    Neurological:  Positive for weakness.   Psychiatric/Behavioral: Negative.     Objective:     Vital Signs (Most Recent):  Temp: 96 °F (35.6 °C) (10/17/22 0748)  Pulse: 105 (10/17/22 0845)  Resp: (!) 22 (10/17/22 0845)  BP: 136/88 (10/17/22 0748)  SpO2: 95 % (10/17/22 0900)   Vital Signs (24h Range):  Temp:  [96 °F (35.6 °C)-99.2 °F (37.3 °C)] 96 °F (35.6 °C)  Pulse:  [] 105  Resp:  [16-22] 22  SpO2:  [95 %-97 %] 95 %  BP: (133-157)/(64-88) 136/88     Weight: 40.8 kg (90 lb)  Body mass index is 16.46 kg/m².    Physical Exam  Vitals and nursing note reviewed. Exam conducted with a chaperone present.   Constitutional:       Appearance: She is well-groomed. She is cachectic. She is ill-appearing.   HENT:      Head: Normocephalic.      Nose: Nose normal.      Mouth/Throat:      Mouth: Mucous membranes are moist.   Cardiovascular:      Rate and Rhythm: Tachycardia present.      Pulses: Normal pulses.   Pulmonary:      Breath sounds: Decreased breath sounds present. No wheezing.      Comments: WOB with conversation   Abdominal:      General: Abdomen is flat.      Palpations: Abdomen is soft.      Tenderness: There is abdominal tenderness.   Musculoskeletal:      Right lower leg: No edema.      Left lower leg: No edema.   Skin:     Capillary Refill: Capillary refill takes less than 2 seconds.      Coloration: Skin is pale.      Findings: Bruising present.   Neurological:      Mental  Status: She is alert and oriented to person, place, and time.      Motor: Weakness present.   Psychiatric:         Mood and Affect: Mood normal.         Behavior: Behavior normal. Behavior is cooperative.         Thought Content: Thought content normal.       Review of Symptoms      Symptom Assessment (ESAS 0-10 Scale)  Pain:  0  Dyspnea:  3  Anxiety:  0  Nausea:  3  Depression:  0  Anorexia:  0  Fatigue:  2  Insomnia:  0  Restlessness:  0  Agitation:  0     CAM / Delirium:  Negative  Constipation:  Negative      Performance Status:  40    Living Arrangements:  Lives with spouse and Lives in home    Psychosocial/Cultural: Pt lives at home with her .  Dialysis pt.  Pt has 2 grown children.     Spiritual:  F - Aleja and Belief:  Voodoo   I - Importance:  Moderate   C - Community:  Family support   A - Address in Care:  Pastoral visits prn.      Time-Based Charting:  Yes  Chart Review: 17 minutes  Face to Face: 16 minutes  Symptom Assessment: 16 minutes  Coordination of Care: 12 minutes  Discharge Plannin minutes  Advance Care Plannin minutes  Goals of Care: 8 minutes    Total Time Spent: 89 minutes      Advance Care Planning   Advance Directives:   Living Will: No    LaPOST: No    Do Not Resuscitate Status: No    Medical Power of : No      Decision Making:  Patient answered questions  Goals of Care: The patient and family endorses that what is most important right now is to focus on spending time at home, remaining as independent as possible, symptom/pain control, and improvement in condition but with limits to invasive therapies    Accordingly, we have decided that the best plan to meet the patient's goals includes continuing with treatment       Significant Labs: All pertinent labs within the past 24 hours have been reviewed.  CBC:   Recent Labs   Lab 10/17/22  0317   WBC 5.58   HGB 12.2   HCT 39.2   MCV 99*   *     BMP:  Recent Labs   Lab 10/17/22  0317   GLU 66*   *   K 4.8       CO2 15*   BUN 15   CREATININE 1.9*   CALCIUM 8.7     LFT:  Lab Results   Component Value Date    AST 68 (H) 10/14/2022    ALKPHOS 215 (H) 10/14/2022    BILITOT 0.5 10/14/2022     Albumin:   Albumin   Date Value Ref Range Status   10/17/2022 2.8 (L) 3.5 - 5.2 g/dL Final     Protein:   Total Protein   Date Value Ref Range Status   10/14/2022 6.9 6.0 - 8.4 g/dL Final     Comment:     Specimen moderately hemolyzed     Lactic acid:   Lab Results   Component Value Date    LACTATE 1.4 10/12/2022    LACTATE 1.3 10/11/2022       Significant Imaging: I have reviewed all pertinent imaging results/findings within the past 24 hours.      Pau Vee NP  Palliative Medicine  [facility]    Advance Care Planning     Date: 10/17/2022    [facility]  I engaged the patient and family in a conversation about advance care planning and we specifically addressed what the goals of care would be moving forward, in light of the patient's change in clinical status, specifically COPD.  We did specifically address the patient's likely prognosis, which is fair .  We explored the patient's values and preferences for future care.  The patient and family endorses that what is most important right now is to focus on remaining as independent as possible, symptom/pain control and improvement in condition but with limits to invasive therapies    Accordingly, we have decided that the best plan to meet the patient's goals includes continuing with treatment    I did not explain the role for hospice care at this stage of the patient's illness, including its ability to help the patient live with the best quality of life possible.  We will not be making a hospice referral.    I spent a total of 16 minutes engaging the patient in this advance care planning discussion.

## 2022-10-17 NOTE — SUBJECTIVE & OBJECTIVE
Subjective:     Interval History:     Seen during dialysis  Egd cnacelled because of respiratory status after discussions with anesthesia  No vomiting. Some nausea.  No fever but tachy and some SOB.  No radiating symptoms    Review of Systems   Constitutional:  Negative for chills and fever.   Respiratory:  Positive for shortness of breath. Negative for choking and chest tightness.    Gastrointestinal:  Positive for nausea. Negative for vomiting.   Neurological:  Negative for dizziness and headaches.   Psychiatric/Behavioral:  Negative for agitation and behavioral problems.    Objective:     Vital Signs (Most Recent):  Temp: 96.1 °F (35.6 °C) (10/17/22 1127)  Pulse: (!) 114 (10/17/22 1313)  Resp: 16 (10/17/22 1421)  BP: (!) 140/88 (10/17/22 1127)  SpO2: 97 % (10/17/22 1313)   Vital Signs (24h Range):  Temp:  [96 °F (35.6 °C)-99.2 °F (37.3 °C)] 96.1 °F (35.6 °C)  Pulse:  [] 114  Resp:  [16-22] 16  SpO2:  [95 %-97 %] 97 %  BP: (133-157)/(64-88) 140/88     Weight: 40.8 kg (90 lb) (10/13/22 1007)  Body mass index is 16.46 kg/m².      Intake/Output Summary (Last 24 hours) at 10/17/2022 1526  Last data filed at 10/17/2022 0552  Gross per 24 hour   Intake 492.98 ml   Output 200 ml   Net 292.98 ml       Lines/Drains/Airways       Central Venous Catheter Line  Duration                  Hemodialysis AV Graft Left upper arm -- days    Permacath right subclavian -- days              Drain  Duration             Female External Urinary Catheter 10/14/22 2005 2 days              Peripheral Intravenous Line  Duration                  Peripheral IV - Single Lumen 10/11/22 1247 20 G Right Antecubital 6 days                    Physical Exam  Vitals reviewed.   Constitutional:       General: She is not in acute distress.  HENT:      Head: Normocephalic and atraumatic.   Eyes:      General: No scleral icterus.     Conjunctiva/sclera: Conjunctivae normal.   Pulmonary:      Comments: Increased wob  Abdominal:      Palpations:  Abdomen is soft. There is no mass.      Tenderness: There is no abdominal tenderness.   Skin:     General: Skin is warm.      Coloration: Skin is pale.      Findings: No rash.   Neurological:      Mental Status: She is oriented to person, place, and time.      Gait: Gait normal.   Psychiatric:         Mood and Affect: Mood normal.         Behavior: Behavior normal.       Significant Labs:  Blood Culture: No results for input(s): LABBLOO in the last 48 hours.  CBC:   Recent Labs   Lab 10/16/22  0418 10/17/22  0317   WBC 3.84* 5.58   HGB 13.0 12.2   HCT 44.1 39.2   * 119*     BMP:   Recent Labs   Lab 10/17/22  0317   GLU 66*   *   K 4.8      CO2 15*   BUN 15   CREATININE 1.9*   CALCIUM 8.7     CMP:   Recent Labs   Lab 10/17/22  0317   GLU 66*   CALCIUM 8.7   ALBUMIN 2.8*   *   K 4.8   CO2 15*      BUN 15   CREATININE 1.9*     Coagulation: No results for input(s): PT, INR, APTT in the last 48 hours.      Significant Imaging:  Imaging results within the past 24 hours have been reviewed.

## 2022-10-17 NOTE — PT/OT/SLP PROGRESS
Physical Therapy      Patient Name:  Katie Quevedo   MRN:  [id number]    1524 - Patient not seen today secondary to off unit in dialysis . Will follow-up next tx date.

## 2022-10-17 NOTE — ASSESSMENT & PLAN NOTE
"At time of initial consult, pt resting in bed with daughter at the bedside.  Pt receptive to palliative visit at this time.  Pt lives at home with her  and is independent of ADLS.  Pt is a dialysis pt and  transports pt as she no longer drives.  Prior to this hospitalization, pt reports being "pretty healthy" and still maintains an active life style.  Pts daughter expresses concern as the pts  also had the flu recently and is very weak himself.  Daughters expresses being overwhelmed and requested information about home help.  Pt is adamant that she return back home after this admission and states that any sort of placement would not be acceptable to the pt.  Home health and home PT/OT discussed.  Given pts high functional status and desire to continue HD, the topic of hospice was not introduced.  Pts  Dre Quevedo is her MPOA. Pt has 2 grown children.     We discussed goals of care. At this point in time, family wishes to focus on aggressive treatment options with the goals of returning the pt back to baseline.  Pt is in agreement with goals.  We discussed code status.  I expressed concerns that if the pt were to require ventilation, there would not be a way to get her off the ventilator due to the state of her lungs.  Pt verbalized understanding but states that she "is not ready to make that decision just yet and wants some time to think about it."  Pts remains a full code.  We also discussed a living will.  Form left at the bedside as the pt would like to complete it at home with her . Pt expresses sadness that her  has not been able to see the pt as he is home dealing with his own health issues. Emotional support offered.  Will continue to discuss goals of care.   "

## 2022-10-17 NOTE — PLAN OF CARE
EGD canceled per Dr. Vences and Dr Castro. Notified Radha, nurse and she verbalized understanding.

## 2022-10-17 NOTE — ASSESSMENT & PLAN NOTE
Hypotension with systolics in the 60s noted on 10/12  Transiently responded to 1 L IV fluid bolus and was placed on Levophed for a few hours, now off pressors     Blood cultures show no growth till date.  Suspect infectious/sepsis picture. completed 5 days of antibiotics for presumed secondary bacterial infection.  Echocardiogram showed preserved LVEF.

## 2022-10-17 NOTE — ASSESSMENT & PLAN NOTE
egd delayed today  She has worsening respiratory status    Ok for liquids  NPO past Mn  Will reschedule for tomorrow, pending respiratory status  PPI IV daily

## 2022-10-17 NOTE — PROGRESS NOTES
[facility] - Telemetry  Gastroenterology  Progress Note    Patient Name: Katie Quevedo  MRN: [id number]  Admission Date: 10/11/2022  Hospital Length of Stay: 6 days  Code Status: Full Code   Attending Provider: Andreia Salazar MD  Consulting Provider: Dragan Bruno MD  Primary Care Physician: Kingston Verduzco MD  Principal Problem: Acute on chronic respiratory failure      Subjective:     Interval History:     Seen during dialysis  Egd cnacelled because of respiratory status after discussions with anesthesia  No vomiting. Some nausea.  No fever but tachy and some SOB.  No radiating symptoms    Review of Systems   Constitutional:  Negative for chills and fever.   Respiratory:  Positive for shortness of breath. Negative for choking and chest tightness.    Gastrointestinal:  Positive for nausea. Negative for vomiting.   Neurological:  Negative for dizziness and headaches.   Psychiatric/Behavioral:  Negative for agitation and behavioral problems.    Objective:     Vital Signs (Most Recent):  Temp: 96.1 °F (35.6 °C) (10/17/22 1127)  Pulse: (!) 114 (10/17/22 1313)  Resp: 16 (10/17/22 1421)  BP: (!) 140/88 (10/17/22 1127)  SpO2: 97 % (10/17/22 1313)   Vital Signs (24h Range):  Temp:  [96 °F (35.6 °C)-99.2 °F (37.3 °C)] 96.1 °F (35.6 °C)  Pulse:  [] 114  Resp:  [16-22] 16  SpO2:  [95 %-97 %] 97 %  BP: (133-157)/(64-88) 140/88     Weight: 40.8 kg (90 lb) (10/13/22 1007)  Body mass index is 16.46 kg/m².      Intake/Output Summary (Last 24 hours) at 10/17/2022 1526  Last data filed at 10/17/2022 0552  Gross per 24 hour   Intake 492.98 ml   Output 200 ml   Net 292.98 ml       Lines/Drains/Airways       Central Venous Catheter Line  Duration                  Hemodialysis AV Graft Left upper arm -- days    Permacath right subclavian -- days              Drain  Duration             Female External Urinary Catheter 10/14/22 2005 2 days              Peripheral Intravenous Line  Duration                  Peripheral IV - Single Lumen  10/11/22 1247 20 G Right Antecubital 6 days                    Physical Exam  Vitals reviewed.   Constitutional:       General: She is not in acute distress.  HENT:      Head: Normocephalic and atraumatic.   Eyes:      General: No scleral icterus.     Conjunctiva/sclera: Conjunctivae normal.   Pulmonary:      Comments: Increased wob  Abdominal:      Palpations: Abdomen is soft. There is no mass.      Tenderness: There is no abdominal tenderness.   Skin:     General: Skin is warm.      Coloration: Skin is pale.      Findings: No rash.   Neurological:      Mental Status: She is oriented to person, place, and time.      Gait: Gait normal.   Psychiatric:         Mood and Affect: Mood normal.         Behavior: Behavior normal.       Significant Labs:  Blood Culture: No results for input(s): LABBLOO in the last 48 hours.  CBC:   Recent Labs   Lab 10/16/22  0418 10/17/22  0317   WBC 3.84* 5.58   HGB 13.0 12.2   HCT 44.1 39.2   * 119*     BMP:   Recent Labs   Lab 10/17/22  0317   GLU 66*   *   K 4.8      CO2 15*   BUN 15   CREATININE 1.9*   CALCIUM 8.7     CMP:   Recent Labs   Lab 10/17/22  0317   GLU 66*   CALCIUM 8.7   ALBUMIN 2.8*   *   K 4.8   CO2 15*      BUN 15   CREATININE 1.9*     Coagulation: No results for input(s): PT, INR, APTT in the last 48 hours.      Significant Imaging:  Imaging results within the past 24 hours have been reviewed.    Assessment/Plan:     Hematemesis of unknown cause  egd delayed today  She has worsening respiratory status    Ok for liquids  NPO past Mn  Will reschedule for tomorrow, pending respiratory status  PPI IV daily        Thank you for your consult. I will follow-up with patient. Please contact us if you have any additional questions.    Dragan Bruno MD  Gastroenterology  [facility]

## 2022-10-17 NOTE — PLAN OF CARE
Report received from dante   patient alert and oriented   npo status verified   vss   patient ready for procedure

## 2022-10-17 NOTE — PROGRESS NOTES
[facility] Medicine  Progress Note    Patient Name: Katie Quevedo  MRN: [id number]  Patient Class: IP- Inpatient   Admission Date: 10/11/2022  Length of Stay: 6 days  Attending Physician: Andreia Salazar MD  Primary Care Provider: Kingston Verduzco MD        Subjective:     Principal Problem:Acute on chronic respiratory failure        HPI:  Katie is a 77 y.o. female with past medical history of CHF, COPD, ESRD, hypertension, migraines, PE who presents with complaint of increased shortness of breath, increased cough compared to usual and fever for the last 2 days.  She states that her  recently was diagnosed with the flu.  She is normally on 3 L of oxygen via nasal cannula at home.  Her pulse ox was reading in the 80s today.  She did breathing treatments this morning which she thought maybe helped a little bit.  In the emergency department she did test positive for influenza.  She is also febrile with a T-max of 101°.  She is admitted for acute on chronic respiratory failure in the setting of influenza infection.      Overview/Hospital Course:  No notes on file    Interval History:  Her nausea improved with Compazine.  Has not had any vomiting this morning.  More awake this morning.  No shortness of breath at rest but reports having dyspnea on minimal exertion.    Review of Systems   Constitutional:  Positive for activity change, appetite change and fatigue.   Eyes:  Negative for visual disturbance.   Respiratory:  Positive for cough and shortness of breath.    Cardiovascular:  Negative for chest pain and leg swelling.   Gastrointestinal:  Positive for nausea. Negative for abdominal pain, diarrhea and vomiting.   Genitourinary:  Negative for dysuria, frequency and urgency.   Musculoskeletal:  Negative for back pain.   Neurological:  Negative for light-headedness and headaches.   Psychiatric/Behavioral:  Negative for confusion.    Objective:     Vital Signs (Most Recent):  Temp: 97.5 °F (36.4 °C)  (10/17/22 1430)  Pulse: (!) 122 (10/17/22 1609)  Resp: (!) 21 (10/17/22 1430)  BP: (!) 148/87 (10/17/22 1536)  SpO2: 97 % (10/17/22 1313)   Vital Signs (24h Range):  Temp:  [96 °F (35.6 °C)-99.2 °F (37.3 °C)] 97.5 °F (36.4 °C)  Pulse:  [] 122  Resp:  [16-22] 21  SpO2:  [95 %-97 %] 97 %  BP: (120-157)/(64-97) 148/87     Weight: 40.8 kg (90 lb)  Body mass index is 16.46 kg/m².    Intake/Output Summary (Last 24 hours) at 10/17/2022 1617  Last data filed at 10/17/2022 0552  Gross per 24 hour   Intake 492.98 ml   Output 200 ml   Net 292.98 ml        Physical Exam  Vitals and nursing note reviewed.   Constitutional:       General: She is not in acute distress.     Appearance: She is ill-appearing.      Comments: Elderly, frail appearing  Diffuse muscle wasting noted   HENT:      Head: Normocephalic and atraumatic.      Mouth/Throat:      Mouth: Mucous membranes are dry.   Eyes:      General: No scleral icterus.  Cardiovascular:      Rate and Rhythm: Normal rate and regular rhythm.      Pulses: Normal pulses.      Heart sounds: Normal heart sounds. No murmur heard.  Pulmonary:      Effort: Pulmonary effort is normal. No respiratory distress.      Breath sounds: No wheezing.      Comments: Decreased breath sounds bilaterally  Abdominal:      Palpations: Abdomen is soft.      Tenderness: There is no abdominal tenderness.   Musculoskeletal:      Cervical back: Neck supple.      Right lower leg: No edema.      Left lower leg: No edema.   Skin:     General: Skin is warm and dry.      Findings: No bruising or rash.   Neurological:      Mental Status: She is alert and oriented to person, place, and time.      Comments: Moves all extremities voluntarily   Psychiatric:         Mood and Affect: Mood normal.       Significant Labs: All pertinent labs within the past 24 hours have been reviewed.    Significant Imaging: I have reviewed all pertinent imaging results/findings within the past 24 hours.      Assessment/Plan:      *  Acute on chronic respiratory failure  Likely secondary to influenza infection.  Vaccinated for influenza about 4 months ago.  Tamiflu X 5 days    Will complete 5 days of antibiotics (cefepime/vancomycin X 3 days followed by ceftriaxone X 2 days) for suspected secondary bacterial infection given hypotension.  Blood cultures negative.    Continue scheduled inhalers    Reported to be on 2 L oxygen at baseline for COPD, confirmed with patient's .  Wean oxygen as tolerated.      Hypotension  Hypotension with systolics in the 60s noted on 10/12  Transiently responded to 1 L IV fluid bolus and was placed on Levophed for a few hours, now off pressors     Blood cultures show no growth till date.  Suspect infectious/sepsis picture. completed 5 days of antibiotics for presumed secondary bacterial infection.  Echocardiogram showed preserved LVEF.      Chronic respiratory failure  On 3 L oxygen at baseline for COPD      Influenza A virus present  will treat influenza with a five-day course of Tamiflu.          ESRD (end stage renal disease) on dialysis  Follows with Dr. Diggs. Nephrology consulted      Nausea  Persistent refractory nausea.  Reported episode of coffee-ground emesis, dark stools on 10/16.  Stool occult blood positive.  Chemical DVT prophylaxis on hold.  Hemoglobin stable.  Continue IV PPI.  Zofran/Phenergan ineffective.  Compazine p.r.n. for nausea control.  CT abdomen pelvis with IV contrast, showed fluid full stomach.  Was unable tolerate p.o. contrast.    GI consult.  Patient was scheduled to get a EGD today but canceled due to respiratory issues.  Clear liquid diet.  NPO after midnight.        VTE Risk Mitigation (From admission, onward)         Ordered     heparin (porcine) injection 4,100 Units  As needed (PRN)         10/12/22 1215     IP VTE HIGH RISK PATIENT  Once         10/11/22 2039     Place sequential compression device  Until discontinued         10/11/22 2039                Discharge  Planning   LAITH: 10/18/2022     Code Status: Full Code   Is the patient medically ready for discharge?:     Reason for patient still in hospital (select all that apply): Patient new problem, Patient trending condition and Consult recommendations  Discharge Plan A: Home, Home with family, Home Health   Discharge Delays: Orders Needed    Palliative care was consulted to discuss code status and overall goals of care.  She remains a full code.  Appreciate assistance.      Andreia Salazar MD  Department of [facility] Medicine   [facility]

## 2022-10-17 NOTE — PROGRESS NOTES
10/17/22 1721   Vital Signs   Pulse (!) 127   Heart Rate Source Monitor   Flow (L/min) 3   O2 Device (Oxygen Therapy) nasal cannula   /70   BP Location Right arm   BP Method Automatic   Patient Position Lying   During Hemodialysis Assessment   Blood Flow Rate (mL/min) 350 mL/min   Dialysate Flow Rate (mL/min) 700 ml/min   Ultrafiltration Rate (mL/Hr) 50 mL/Hr   Arteriovenous Lines Secure Yes   Arterial Pressure (mmHg) -144 mmHg   Venous Pressure (mmHg) 160   UF Removed (mL) 994 mL   TMP 6   Venous Line in Air Detector Yes   Intake (mL) 0 mL   Transducer Dry Yes   Access Visible Yes    notified of access issue? N/A   Heparin given? N/A   Intra-Hemodialysis Comments minimal UF active, pt requested to end tx, MD notified, TX completed.   Post-Hemodialysis Assessment   Rinseback Volume (mL) 250 mL   Blood Volume Processed (Liters) 50.6 L   Dialyzer Clearance Lightly streaked   Duration of Treatment 150 minutes   Additional Fluid Intake (mL) 250 mL   Total UF (mL) 994 mL   Net Fluid Removal 494   Patient Response to Treatment Patient tolerated tx well.   Post-Hemodialysis Comments blood returned, lines disconnected per P&P and locked with heparin, NAD. Post HD report given to primary nurse.

## 2022-10-17 NOTE — PLAN OF CARE
"[place] - Telemetry  Discharge Reassessment    Primary Care Provider: Kingston Verduzco MD    Expected Discharge Date:     Reassessment (most recent)       Discharge Reassessment - 10/17/22 1129          Discharge Reassessment    Assessment Type Discharge Planning Reassessment (P)      Did the patient's condition or plan change since previous assessment? No (P)      Discharge Plan discussed with: Spouse/sig other (P)      Name(s) and Number(s) Dre Quevedo (P)      Discharge Plan A Home;Home with family;Home Health (P)      DME Needed Upon Discharge  none (P)      Discharge Barriers Identified None (P)      Why the patient remains in the hospital Requires continued medical care (P)         Post-Acute Status    Post-Acute Authorization Home Health (P)      Home Health Status Referrals Sent (P)    Referral sent to [facility]. Pt's spouse agreeable. Pt choice form done.    Discharge Delays Orders Needed (P)                    Future Appointments   Date Time Provider Department Center   10/28/2022 10:30 AM Clemencia Gambino MD [facility] IMPRI [place] [facility]   11/2/2022  8:30 AM Maritza Calhoun MD [facility] ORTHO [place]   12/6/2022 10:20 AM Jacinto Henriquez MD [facility] CARDIO [place] [facility]     BP (!) 140/88   Pulse 100   Temp 96.1 °F (35.6 °C)   Resp 16   Ht 5' 2" (1.575 m)   Wt 40.8 kg (90 lb)   LMP  (LMP Unknown)   SpO2 95%   BMI 16.46 kg/m²      albuterol-ipratropium  3 mL Nebulization Q6H WAKE    atorvastatin  80 mg Oral Daily    busPIRone  10 mg Oral Daily    DULoxetine  30 mg Oral Daily    mirtazapine  15 mg Oral QHS    oseltamivir  30 mg Oral Every Mon, Wed, Fri    pantoprazole  40 mg Intravenous BID    senna-docusate 8.6-50 mg  1 tablet Oral BID    sodium chloride 0.9%  250 mL Intravenous Once         "

## 2022-10-17 NOTE — PT/OT/SLP PROGRESS
Occupational Therapy      Patient Name:  Katie Quevedo   MRN:  [id number]    Patient not seen today secondary to Dialysis (Pt en route to HD.). Will follow-up 10/18/2022.    10/17/2022

## 2022-10-17 NOTE — PLAN OF CARE
Problem: Adult Inpatient Plan of Care  Goal: Plan of Care Review  Outcome: Ongoing, Progressing  Goal: Patient-Specific Goal (Individualized)  Outcome: Ongoing, Progressing  Goal: Absence of Hospital-Acquired Illness or Injury  Outcome: Ongoing, Progressing  Goal: Optimal Comfort and Wellbeing  Outcome: Ongoing, Progressing  Goal: Readiness for Transition of Care  Outcome: Ongoing, Progressing     Problem: COPD (Chronic Obstructive Pulmonary Disease) Comorbidity  Goal: Maintenance of COPD Symptom Control  Outcome: Ongoing, Progressing     Problem: Heart Failure Comorbidity  Goal: Maintenance of Heart Failure Symptom Control  Outcome: Ongoing, Progressing     Problem: Hypertension Comorbidity  Goal: Blood Pressure in Desired Range  Outcome: Ongoing, Progressing     Problem: Infection  Goal: Absence of Infection Signs and Symptoms  Outcome: Ongoing, Progressing     Problem: Gas Exchange Impaired  Goal: Optimal Gas Exchange  Outcome: Ongoing, Progressing

## 2022-10-17 NOTE — PLAN OF CARE
Problem: Adult Inpatient Plan of Care  Goal: Plan of Care Review  Outcome: Ongoing, Progressing  Goal: Patient-Specific Goal (Individualized)  Outcome: Ongoing, Progressing  Goal: Absence of Hospital-Acquired Illness or Injury  Outcome: Ongoing, Progressing  Goal: Optimal Comfort and Wellbeing  Outcome: Ongoing, Progressing   Patient AAOx4 with no s/s of acute distress.  Pain medication effective.  No s/s bleeding.  Nausea, medication moderate effect.  No emesis at this time.  No BM.  On C.L diet tolerating.  NPO after MN for study.  SOB, titrated up to 2 liters NC.  Patient was 92% on room-air.  See vitals flow sheet.  Safety checks performed.  All needs addressed.  Will continue to monitor.

## 2022-10-17 NOTE — NURSING
Received report from dialysis nurse that pt requested to end tx due to anxiety. Pt returned to room c/o anxiety and increased sob. Bp 124/71 hr 120s-130 sinus tach. Resp 26 , sating 96% on 3L. JACQUELINE Salazar Md notified and en route.

## 2022-10-17 NOTE — HPI
"Per chart, "Katie is a 77 y.o. female with past medical history of CHF, COPD, ESRD, hypertension, migraines, PE who presents with complaint of increased shortness of breath, increased cough compared to usual and fever for the last 2 days.  She states that her  recently was diagnosed with the flu.  She is normally on 3 L of oxygen via nasal cannula at home.  Her pulse ox was reading in the 80s today.  She did breathing treatments this morning which she thought maybe helped a little bit.  In the emergency department she did test positive for influenza.  She is also febrile with a T-max of 101°.  She is admitted for acute on chronic respiratory failure in the setting of influenza infection."     "

## 2022-10-18 PROBLEM — Z71.89 ADVANCE CARE PLANNING: Status: ACTIVE | Noted: 2022-10-18

## 2022-10-18 LAB
ALBUMIN SERPL BCP-MCNC: 2.8 G/DL (ref 3.5–5.2)
ANION GAP SERPL CALC-SCNC: 9 MMOL/L (ref 8–16)
ANISOCYTOSIS BLD QL SMEAR: SLIGHT
BACTERIA BLD CULT: NORMAL
BASOPHILS NFR BLD: 2 % (ref 0–1.9)
BUN SERPL-MCNC: 11 MG/DL (ref 8–23)
CALCIUM SERPL-MCNC: 8.6 MG/DL (ref 8.7–10.5)
CHLORIDE SERPL-SCNC: 102 MMOL/L (ref 95–110)
CO2 SERPL-SCNC: 24 MMOL/L (ref 23–29)
CREAT SERPL-MCNC: 1.6 MG/DL (ref 0.5–1.4)
DIFFERENTIAL METHOD: ABNORMAL
EOSINOPHIL NFR BLD: 3 % (ref 0–8)
ERYTHROCYTE [DISTWIDTH] IN BLOOD BY AUTOMATED COUNT: 13.4 % (ref 11.5–14.5)
EST. GFR  (NO RACE VARIABLE): 33 ML/MIN/1.73 M^2
GIANT PLATELETS BLD QL SMEAR: PRESENT
GLUCOSE SERPL-MCNC: 75 MG/DL (ref 70–110)
HCT VFR BLD AUTO: 38.1 % (ref 37–48.5)
HGB BLD-MCNC: 12.2 G/DL (ref 12–16)
IMM GRANULOCYTES # BLD AUTO: ABNORMAL K/UL (ref 0–0.04)
IMM GRANULOCYTES NFR BLD AUTO: ABNORMAL % (ref 0–0.5)
LYMPHOCYTES NFR BLD: 17 % (ref 18–48)
MCH RBC QN AUTO: 30.5 PG (ref 27–31)
MCHC RBC AUTO-ENTMCNC: 32 G/DL (ref 32–36)
MCV RBC AUTO: 95 FL (ref 82–98)
MONOCYTES NFR BLD: 17 % (ref 4–15)
NEUTROPHILS NFR BLD: 61 % (ref 38–73)
NRBC BLD-RTO: 0 /100 WBC
PHOSPHATE SERPL-MCNC: 1.5 MG/DL (ref 2.7–4.5)
PLATELET # BLD AUTO: 154 K/UL (ref 150–450)
PLATELET BLD QL SMEAR: ABNORMAL
PMV BLD AUTO: 10.5 FL (ref 9.2–12.9)
POTASSIUM SERPL-SCNC: 4.4 MMOL/L (ref 3.5–5.1)
RBC # BLD AUTO: 4 M/UL (ref 4–5.4)
SODIUM SERPL-SCNC: 135 MMOL/L (ref 136–145)
WBC # BLD AUTO: 6.47 K/UL (ref 3.9–12.7)

## 2022-10-18 PROCEDURE — 94640 AIRWAY INHALATION TREATMENT: CPT

## 2022-10-18 PROCEDURE — 85027 COMPLETE CBC AUTOMATED: CPT | Performed by: STUDENT IN AN ORGANIZED HEALTH CARE EDUCATION/TRAINING PROGRAM

## 2022-10-18 PROCEDURE — 99232 SBSQ HOSP IP/OBS MODERATE 35: CPT | Mod: GC,,, | Performed by: INTERNAL MEDICINE

## 2022-10-18 PROCEDURE — 25000003 PHARM REV CODE 250: Performed by: NURSE PRACTITIONER

## 2022-10-18 PROCEDURE — 63600175 PHARM REV CODE 636 W HCPCS: Performed by: STUDENT IN AN ORGANIZED HEALTH CARE EDUCATION/TRAINING PROGRAM

## 2022-10-18 PROCEDURE — 80069 RENAL FUNCTION PANEL: CPT | Performed by: STUDENT IN AN ORGANIZED HEALTH CARE EDUCATION/TRAINING PROGRAM

## 2022-10-18 PROCEDURE — 97530 THERAPEUTIC ACTIVITIES: CPT

## 2022-10-18 PROCEDURE — 11000001 HC ACUTE MED/SURG PRIVATE ROOM

## 2022-10-18 PROCEDURE — 85007 BL SMEAR W/DIFF WBC COUNT: CPT | Performed by: STUDENT IN AN ORGANIZED HEALTH CARE EDUCATION/TRAINING PROGRAM

## 2022-10-18 PROCEDURE — 36415 COLL VENOUS BLD VENIPUNCTURE: CPT | Performed by: STUDENT IN AN ORGANIZED HEALTH CARE EDUCATION/TRAINING PROGRAM

## 2022-10-18 PROCEDURE — C1752 CATH,HEMODIALYSIS,SHORT-TERM: HCPCS

## 2022-10-18 PROCEDURE — 27000221 HC OXYGEN, UP TO 24 HOURS

## 2022-10-18 PROCEDURE — 25000003 PHARM REV CODE 250: Performed by: FAMILY MEDICINE

## 2022-10-18 PROCEDURE — 97110 THERAPEUTIC EXERCISES: CPT | Mod: CQ

## 2022-10-18 PROCEDURE — 27000207 HC ISOLATION

## 2022-10-18 PROCEDURE — 25000242 PHARM REV CODE 250 ALT 637 W/ HCPCS: Performed by: INTERNAL MEDICINE

## 2022-10-18 PROCEDURE — 99232 PR SUBSEQUENT HOSPITAL CARE,LEVL II: ICD-10-PCS | Mod: GC,,, | Performed by: INTERNAL MEDICINE

## 2022-10-18 PROCEDURE — 99900035 HC TECH TIME PER 15 MIN (STAT)

## 2022-10-18 PROCEDURE — 25000003 PHARM REV CODE 250: Performed by: INTERNAL MEDICINE

## 2022-10-18 PROCEDURE — 25000003 PHARM REV CODE 250: Performed by: STUDENT IN AN ORGANIZED HEALTH CARE EDUCATION/TRAINING PROGRAM

## 2022-10-18 PROCEDURE — 94761 N-INVAS EAR/PLS OXIMETRY MLT: CPT

## 2022-10-18 PROCEDURE — 25500020 PHARM REV CODE 255: Performed by: FAMILY MEDICINE

## 2022-10-18 PROCEDURE — C9113 INJ PANTOPRAZOLE SODIUM, VIA: HCPCS | Performed by: STUDENT IN AN ORGANIZED HEALTH CARE EDUCATION/TRAINING PROGRAM

## 2022-10-18 RX ORDER — METOPROLOL TARTRATE 25 MG/1
25 TABLET, FILM COATED ORAL 2 TIMES DAILY
Status: DISCONTINUED | OUTPATIENT
Start: 2022-10-18 | End: 2022-10-20 | Stop reason: HOSPADM

## 2022-10-18 RX ORDER — LORAZEPAM 0.5 MG/1
0.5 TABLET ORAL EVERY 12 HOURS PRN
Status: DISCONTINUED | OUTPATIENT
Start: 2022-10-18 | End: 2022-10-20 | Stop reason: HOSPADM

## 2022-10-18 RX ADMIN — METOPROLOL TARTRATE 25 MG: 25 TABLET, FILM COATED ORAL at 08:10

## 2022-10-18 RX ADMIN — IPRATROPIUM BROMIDE AND ALBUTEROL SULFATE 3 ML: 2.5; .5 SOLUTION RESPIRATORY (INHALATION) at 08:10

## 2022-10-18 RX ADMIN — BUSPIRONE HYDROCHLORIDE 10 MG: 5 TABLET ORAL at 08:10

## 2022-10-18 RX ADMIN — MIRTAZAPINE 15 MG: 7.5 TABLET ORAL at 09:10

## 2022-10-18 RX ADMIN — HYDROCODONE BITARTRATE AND ACETAMINOPHEN 1 TABLET: 5; 325 TABLET ORAL at 12:10

## 2022-10-18 RX ADMIN — ATORVASTATIN CALCIUM 80 MG: 40 TABLET, FILM COATED ORAL at 08:10

## 2022-10-18 RX ADMIN — PANTOPRAZOLE SODIUM 40 MG: 40 INJECTION, POWDER, FOR SOLUTION INTRAVENOUS at 09:10

## 2022-10-18 RX ADMIN — ACETAMINOPHEN 650 MG: 325 TABLET ORAL at 08:10

## 2022-10-18 RX ADMIN — PANTOPRAZOLE SODIUM 40 MG: 40 INJECTION, POWDER, FOR SOLUTION INTRAVENOUS at 08:10

## 2022-10-18 RX ADMIN — HYDROCODONE BITARTRATE AND ACETAMINOPHEN 1 TABLET: 5; 325 TABLET ORAL at 03:10

## 2022-10-18 RX ADMIN — PROMETHAZINE HYDROCHLORIDE 12.5 MG: 25 INJECTION INTRAMUSCULAR; INTRAVENOUS at 10:10

## 2022-10-18 RX ADMIN — IPRATROPIUM BROMIDE AND ALBUTEROL SULFATE 3 ML: 2.5; .5 SOLUTION RESPIRATORY (INHALATION) at 07:10

## 2022-10-18 RX ADMIN — DULOXETINE 30 MG: 30 CAPSULE, DELAYED RELEASE ORAL at 08:10

## 2022-10-18 RX ADMIN — IOHEXOL 100 ML: 350 INJECTION, SOLUTION INTRAVENOUS at 09:10

## 2022-10-18 RX ADMIN — METOPROLOL TARTRATE 25 MG: 25 TABLET, FILM COATED ORAL at 09:10

## 2022-10-18 RX ADMIN — SODIUM PHOSPHATE, MONOBASIC, MONOHYDRATE 15 MMOL: 276; 142 INJECTION, SOLUTION INTRAVENOUS at 11:10

## 2022-10-18 RX ADMIN — MELATONIN TAB 3 MG 6 MG: 3 TAB at 12:10

## 2022-10-18 RX ADMIN — PROMETHAZINE HYDROCHLORIDE 12.5 MG: 25 INJECTION INTRAMUSCULAR; INTRAVENOUS at 01:10

## 2022-10-18 RX ADMIN — IPRATROPIUM BROMIDE AND ALBUTEROL SULFATE 3 ML: 2.5; .5 SOLUTION RESPIRATORY (INHALATION) at 01:10

## 2022-10-18 RX ADMIN — PROMETHAZINE HYDROCHLORIDE 12.5 MG: 25 INJECTION INTRAMUSCULAR; INTRAVENOUS at 12:10

## 2022-10-18 RX ADMIN — HYDROCODONE BITARTRATE AND ACETAMINOPHEN 1 TABLET: 5; 325 TABLET ORAL at 09:10

## 2022-10-18 RX ADMIN — MELATONIN TAB 3 MG 6 MG: 3 TAB at 11:10

## 2022-10-18 NOTE — PROGRESS NOTES
10/18/22 0849   Nurse Notification   Charge Nurse Notified? Yes   Name of Charge Nurse Alexander Carnes RN   Name of Bedside Nurse Erin Vallecillo RN   Nurse Notfication Method Secure Chat   Nurse Notified Of Other  (AI Alert)   Provider Notification   Provider Notified? Yes   Name of Provider Dr. Bowles   Provider Notification Method Secure Chat   Provider Notified Of AI Deterioration Alert

## 2022-10-18 NOTE — PROGRESS NOTES
[facility] Medicine  Progress Note    Patient Name: Katie Quevedo  MRN: [id number]  Patient Class: IP- Inpatient   Admission Date: 10/11/2022  Length of Stay: 7 days  Attending Physician: Jennifer Bowles*  Primary Care Provider: Kingston Verduzco MD        Subjective:     Principal Problem:Acute on chronic respiratory failure        HPI:  Katie is a 77 y.o. female with past medical history of CHF, COPD, ESRD, hypertension, migraines, PE who presents with complaint of increased shortness of breath, increased cough compared to usual and fever for the last 2 days.  She states that her  recently was diagnosed with the flu.  She is normally on 3 L of oxygen via nasal cannula at home.  Her pulse ox was reading in the 80s today.  She did breathing treatments this morning which she thought maybe helped a little bit.  In the emergency department she did test positive for influenza.  She is also febrile with a T-max of 101°.  She is admitted for acute on chronic respiratory failure in the setting of influenza infection.      Overview/Hospital Course:  No notes on file    Interval History: awake and alert, complain of anxiety due to dialysis-lorazepam p.r.n.  Heart rate elevated-start metoprolol   stop Sevelamer due to low phosphate    Review of Systems   Constitutional:  Positive for activity change, appetite change and fatigue.   Eyes:  Negative for visual disturbance.   Respiratory:  Positive for cough. Negative for shortness of breath.    Cardiovascular:  Negative for chest pain and leg swelling.   Gastrointestinal:  Negative for abdominal pain, diarrhea, nausea and vomiting.   Genitourinary:  Negative for dysuria, frequency and urgency.   Musculoskeletal:  Negative for back pain.   Neurological:  Negative for light-headedness and headaches.   Psychiatric/Behavioral:  Negative for confusion. The patient is nervous/anxious.    Objective:     Vital Signs (Most Recent):  Temp: 96.5 °F (35.8 °C)  (10/18/22 0353)  Pulse: 108 (10/18/22 0400)  Resp: 18 (10/18/22 0353)  BP: 137/71 (10/18/22 0353)  SpO2: 97 % (10/18/22 0353) Vital Signs (24h Range):  Temp:  [96 °F (35.6 °C)-98 °F (36.7 °C)] 96.5 °F (35.8 °C)  Pulse:  [100-134] 108  Resp:  [16-26] 18  SpO2:  [92 %-97 %] 97 %  BP: (109-148)/(60-97) 137/71     Weight: 40.8 kg (90 lb)  Body mass index is 16.46 kg/m².    Intake/Output Summary (Last 24 hours) at 10/18/2022 0729  Last data filed at 10/17/2022 1800  Gross per 24 hour   Intake 250 ml   Output 1244 ml   Net -994 ml      Physical Exam  Vitals and nursing note reviewed.   Constitutional:       General: She is not in acute distress.     Appearance: She is ill-appearing.      Comments: Elderly, frail appearing  Diffuse muscle wasting noted   HENT:      Head: Normocephalic and atraumatic.      Mouth/Throat:      Mouth: Mucous membranes are dry.   Eyes:      General: No scleral icterus.  Cardiovascular:      Rate and Rhythm: Normal rate and regular rhythm.      Pulses: Normal pulses.      Heart sounds: Normal heart sounds. No murmur heard.  Pulmonary:      Effort: Pulmonary effort is normal. No respiratory distress.      Breath sounds: No wheezing.      Comments: Decreased breath sounds bilaterally  Abdominal:      Palpations: Abdomen is soft.      Tenderness: There is no abdominal tenderness.   Musculoskeletal:      Cervical back: Neck supple.      Right lower leg: No edema.      Left lower leg: No edema.   Skin:     General: Skin is warm and dry.      Findings: No bruising or rash.   Neurological:      Mental Status: She is alert and oriented to person, place, and time.      Comments: Moves all extremities voluntarily   Psychiatric:         Mood and Affect: Mood normal.       Significant Labs: A1C: No results for input(s): HGBA1C in the last 4320 hours.  ABGs: No results for input(s): PH, PCO2, HCO3, POCSATURATED, BE, TOTALHB, COHB, METHB, O2HB, POCFIO2, PO2 in the last 48 hours.  Blood Culture: No results for  input(s): LABBLOO in the last 48 hours.  CBC:   Recent Labs   Lab 10/17/22  0317 10/18/22  0329   WBC 5.58 6.47   HGB 12.2 12.2   HCT 39.2 38.1   * 154     CMP:   Recent Labs   Lab 10/17/22  0317 10/18/22  0329   * 135*   K 4.8 4.4    102   CO2 15* 24   GLU 66* 75   BUN 15 11   CREATININE 1.9* 1.6*   CALCIUM 8.7 8.6*   ALBUMIN 2.8* 2.8*   ANIONGAP 8 9     Lipase: No results for input(s): LIPASE in the last 48 hours.  Lipid Panel: No results for input(s): CHOL, HDL, LDLCALC, TRIG, CHOLHDL in the last 48 hours.  Troponin: No results for input(s): TROPONINI, TROPONINIHS in the last 48 hours.  TSH: No results for input(s): TSH in the last 4320 hours.  Urine Culture: No results for input(s): LABURIN in the last 48 hours.  Urine Studies: No results for input(s): COLORU, APPEARANCEUA, PHUR, SPECGRAV, PROTEINUA, GLUCUA, KETONESU, BILIRUBINUA, OCCULTUA, NITRITE, UROBILINOGEN, LEUKOCYTESUR, RBCUA, WBCUA, BACTERIA, SQUAMEPITHEL, HYALINECASTS in the last 48 hours.    Invalid input(s): WRIGHTSUR    Significant Imaging: I have reviewed all pertinent imaging results/findings within the past 24 hours.      Assessment/Plan:      * Acute on chronic respiratory failure  Likely secondary to influenza infection.  Vaccinated for influenza about 4 months ago.  Tamiflu X 5 days    Will complete 5 days of antibiotics (cefepime/vancomycin X 3 days followed by ceftriaxone X 2 days) for suspected secondary bacterial infection given hypotension.  Blood cultures negative.    Continue scheduled inhalers    Reported to be on 2 L oxygen at baseline for COPD, confirmed with patient's .  Wean oxygen as tolerated.      Advance care planning  Advance Care Planning     Date: 10/18/2022    Code Status  In light of the patients advanced and life limiting illness,I engaged the the patient in a conversation about the patient's preferences for care  at the very end of life. The patient wishes to have a natural, peaceful death.   Along those lines, the patient does wish to have CPR and other invasive treatments performed when her heart and/or breathing stops. I communicated to the patient that a FULL code order would be placed in her medical record to reflect this preference.  I spent a total of 10 minutes engaging the patient in this advance care planning discussion.             Hypotension  Hypotension with systolics in the 60s noted on 10/12  Transiently responded to 1 L IV fluid bolus and was placed on Levophed for a few hours, now off pressors     Blood cultures show no growth till date.  Suspect infectious/sepsis picture. completed 5 days of antibiotics for presumed secondary bacterial infection.  Echocardiogram showed preserved LVEF.      Chronic respiratory failure  On 3 L oxygen at baseline for COPD      Influenza A virus present  will treat influenza with a five-day course of Tamiflu.          ESRD (end stage renal disease) on dialysis  Follows with Dr. Diggs. Nephrology consulted      Nausea  Persistent refractory nausea.  Reported episode of coffee-ground emesis, dark stools on 10/16.  Stool occult blood positive.  Chemical DVT prophylaxis on hold.  Hemoglobin stable.  Continue IV PPI.  Zofran/Phenergan ineffective.  Compazine p.r.n. for nausea control.  CT abdomen pelvis with IV contrast, showed fluid full stomach.  Was unable tolerate p.o. contrast.    GI consult.  Patient was scheduled to get a EGD today but canceled due to respiratory issues.  Clear liquid diet.  NPO after midnight.        VTE Risk Mitigation (From admission, onward)         Ordered     heparin (porcine) injection 4,100 Units  As needed (PRN)         10/12/22 1215     IP VTE HIGH RISK PATIENT  Once         10/11/22 2039     Place sequential compression device  Until discontinued         10/11/22 2039                Discharge Planning   LAITH: 10/18/2022     Code Status: Full Code   Is the patient medically ready for discharge?:     Reason for patient still in  hospital (select all that apply): Patient trending condition  Discharge Plan A: Home, Home with family, Home Health   Discharge Delays: Orders Needed              Jennifer Bowles MD  Department of Hospital Medicine   [facility]

## 2022-10-18 NOTE — PT/OT/SLP PROGRESS
Occupational Therapy   Treatment    Name: Katie Quevedo  MRN: [id number]  Admitting Diagnosis:  Acute on chronic respiratory failure  1 Day Post-Op    Recommendations:     Discharge Recommendations: other (see comments) (post acute placement for cont therapy vs home with home health OT PT with family support)  Discharge Equipment Recommendations:  bedside commode  Barriers to discharge:  Decreased caregiver support    Assessment:     Katie Quevedo is a 77 y.o. female with a medical diagnosis of Acute on chronic respiratory failure.  She presents with ..The primary encounter diagnosis was Acute on chronic respiratory failure. Diagnoses of SOB (shortness of breath), Chest pain, Influenza, Abnormal finding on GI tract imaging, Hematemesis of unknown cause, and Hypotension, unspecified hypotension type were also pertinent to this visit.  . Performance deficits affecting function are weakness, impaired endurance, impaired sensation, impaired self care skills, impaired functional mobility, gait instability, impaired balance, impaired cardiopulmonary response to activity, pain, decreased coordination.     Continue OT POC. Patient with tolerance for multiple sitting to standing with handheld assist with handheld assist with heartrate in low 100s bpm. Patient 's granddaughter present during session, educated on ways to instruct patient in safe gentle movements (Ie. Such as ankle pumps, BUE elevation/depression, B shoulder retraction/protraction with incorporation of pursed lip breathing). Recommendation for post acute therapy in event family is unable to provide support (patient endorses that her spouse is currently ill) vs home with home health OT PT services and education/information regarding additional possible hired help supports. Would benefit from a bsc.    Rehab Prognosis:  Good; patient would benefit from acute skilled OT services to address these deficits and reach maximum level of function.       Plan:     Patient to be  seen 3 x/week to address the above listed problems via self-care/home management, therapeutic activities, therapeutic exercises  Plan of Care Reviewed with: patient, grandchild(manish)    Subjective     Pain/Comfort:  Pain Rating 1: other (see comments) (reports generalized discomfort globally but states tolerable)  Pain Addressed 1: Nurse notified, Reposition  Pain Rating Post-Intervention 1: other (see comments) (reports increased fatigue but does not report increased pain)    Objective:     Communicated with: nursing prior to session.  Patient found HOB elevated with peripheral IV, bed alarm, oxygen upon OT entry to room. Patient's granddaughter in room.    General Precautions: Standard, fall, droplet   Orthopedic Precautions:N/A   Braces: N/A  Respiratory Status: Nasal cannula, flow 3 L/min     Occupational Performance:     Bed Mobility:    Patient completed Scooting/Bridging with supervision  Patient completed Supine to Sit with modified independence and contact guard assistance  Patient completed Sit to Supine with stand by assistance and contact guard assistance     Functional Mobility/Transfers:  Patient completed Sit <> Stand Transfer with contact guard assistance  with  hand-held assist   Functional Mobility: 5 x sitting to standing with seated rest breaks and CGA / close supervision    Activities of Daily Living:  N/a      [facility] 6 Click ADL: 19    Treatment & Education:  Patient agreeable to session with encouragement from granddaughter present. OT inquired regarding perspective of breathlessness (reports mild) at rest and with little movement in the bed prior to facilitating movement. Educated on 3 simple exercises to perform throughout day as able including in bed. OT demonstrated/ patient and granddaughter reciprocated teachback for BUE elevation/depression, B shoulder retraction/protraction, and ankle pumps. Patient motivated to perform transitional movements eob with support of granddaughter. OT cued  granddaughter to  front of patient to provide visual target (to promote patient to look straight ahead / vs down at floor to promote improved breathing) while completing guided sitting to standing 5 trials with handheld to supervision with min cues for hand placement on bed and monitoring of SpO2 / heartrate (SpO2 maintained in upper 90s on 3L, heartrate fluctuating from 80s to 110s bpm). Reeducated on breathing tech (to reduce use of accessory muscles). Return to supine.    Patient left HOB elevated with all lines intact, call button in reach, bed alarm on, nursing notified, and granddaughter present    GOALS:   Multidisciplinary Problems       Occupational Therapy Goals          Problem: Occupational Therapy    Goal Priority Disciplines Outcome Interventions   Occupational Therapy Goal     OT, PT/OT Ongoing, Progressing    Description: Goals to be met by: 11/11/2022     Patient will increase functional independence with ADLs by performing:    LE Dressing with Modified [place] inclusive of item retrieval.  Grooming while standing at sink with [place].  Toileting from toilet with Modified [place] for hygiene and clothing management.   Toilet transfer to toilet with Modified [place].  Upper extremity exercise program x8 reps per handout with incorporation of pain free gentle movements, with independence.  100% reciprocation of fall risk reduction recommendations to support ease of transition back to home.                         Time Tracking:     OT Date of Treatment: 10/18/22  OT Start Time: 1518  OT Stop Time: 1532  OT Total Time (min): 14 min    Billable Minutes:Therapeutic Activity 14 min    OT/PAMELA: OT          10/18/2022

## 2022-10-18 NOTE — SUBJECTIVE & OBJECTIVE
Interval History: awake and alert, complain of anxiety due to dialysis-lorazepam p.r.n.  Heart rate elevated-start metoprolol   stop Sevelamer due to low phosphate    Review of Systems   Constitutional:  Positive for activity change, appetite change and fatigue.   Eyes:  Negative for visual disturbance.   Respiratory:  Positive for cough. Negative for shortness of breath.    Cardiovascular:  Negative for chest pain and leg swelling.   Gastrointestinal:  Negative for abdominal pain, diarrhea, nausea and vomiting.   Genitourinary:  Negative for dysuria, frequency and urgency.   Musculoskeletal:  Negative for back pain.   Neurological:  Negative for light-headedness and headaches.   Psychiatric/Behavioral:  Negative for confusion. The patient is nervous/anxious.    Objective:     Vital Signs (Most Recent):  Temp: 96.5 °F (35.8 °C) (10/18/22 0353)  Pulse: 108 (10/18/22 0400)  Resp: 18 (10/18/22 0353)  BP: 137/71 (10/18/22 0353)  SpO2: 97 % (10/18/22 0353) Vital Signs (24h Range):  Temp:  [96 °F (35.6 °C)-98 °F (36.7 °C)] 96.5 °F (35.8 °C)  Pulse:  [100-134] 108  Resp:  [16-26] 18  SpO2:  [92 %-97 %] 97 %  BP: (109-148)/(60-97) 137/71     Weight: 40.8 kg (90 lb)  Body mass index is 16.46 kg/m².    Intake/Output Summary (Last 24 hours) at 10/18/2022 0729  Last data filed at 10/17/2022 1800  Gross per 24 hour   Intake 250 ml   Output 1244 ml   Net -994 ml      Physical Exam  Vitals and nursing note reviewed.   Constitutional:       General: She is not in acute distress.     Appearance: She is ill-appearing.      Comments: Elderly, frail appearing  Diffuse muscle wasting noted   HENT:      Head: Normocephalic and atraumatic.      Mouth/Throat:      Mouth: Mucous membranes are dry.   Eyes:      General: No scleral icterus.  Cardiovascular:      Rate and Rhythm: Normal rate and regular rhythm.      Pulses: Normal pulses.      Heart sounds: Normal heart sounds. No murmur heard.  Pulmonary:      Effort: Pulmonary effort is  normal. No respiratory distress.      Breath sounds: No wheezing.      Comments: Decreased breath sounds bilaterally  Abdominal:      Palpations: Abdomen is soft.      Tenderness: There is no abdominal tenderness.   Musculoskeletal:      Cervical back: Neck supple.      Right lower leg: No edema.      Left lower leg: No edema.   Skin:     General: Skin is warm and dry.      Findings: No bruising or rash.   Neurological:      Mental Status: She is alert and oriented to person, place, and time.      Comments: Moves all extremities voluntarily   Psychiatric:         Mood and Affect: Mood normal.       Significant Labs: A1C: No results for input(s): HGBA1C in the last 4320 hours.  ABGs: No results for input(s): PH, PCO2, HCO3, POCSATURATED, BE, TOTALHB, COHB, METHB, O2HB, POCFIO2, PO2 in the last 48 hours.  Blood Culture: No results for input(s): LABBLOO in the last 48 hours.  CBC:   Recent Labs   Lab 10/17/22  0317 10/18/22  0329   WBC 5.58 6.47   HGB 12.2 12.2   HCT 39.2 38.1   * 154     CMP:   Recent Labs   Lab 10/17/22  0317 10/18/22  0329   * 135*   K 4.8 4.4    102   CO2 15* 24   GLU 66* 75   BUN 15 11   CREATININE 1.9* 1.6*   CALCIUM 8.7 8.6*   ALBUMIN 2.8* 2.8*   ANIONGAP 8 9     Lipase: No results for input(s): LIPASE in the last 48 hours.  Lipid Panel: No results for input(s): CHOL, HDL, LDLCALC, TRIG, CHOLHDL in the last 48 hours.  Troponin: No results for input(s): TROPONINI, TROPONINIHS in the last 48 hours.  TSH: No results for input(s): TSH in the last 4320 hours.  Urine Culture: No results for input(s): LABURIN in the last 48 hours.  Urine Studies: No results for input(s): COLORU, APPEARANCEUA, PHUR, SPECGRAV, PROTEINUA, GLUCUA, KETONESU, BILIRUBINUA, OCCULTUA, NITRITE, UROBILINOGEN, LEUKOCYTESUR, RBCUA, WBCUA, BACTERIA, SQUAMEPITHEL, HYALINECASTS in the last 48 hours.    Invalid input(s): WRIGHTSUR    Significant Imaging: I have reviewed all pertinent imaging results/findings within  the past 24 hours.

## 2022-10-18 NOTE — PROGRESS NOTES
Nephrology Progress Note       Consult Requested By: Jennifer Bowles*  Reason for Consult: ESRD     SUBJECTIVE:        Review of Systems   Constitutional:  Positive for malaise/fatigue. Negative for chills and fever.   HENT:  Negative for congestion and sore throat.    Eyes:  Negative for blurred vision, double vision and photophobia.   Respiratory:  Positive for cough and shortness of breath.    Cardiovascular:  Negative for chest pain, palpitations and leg swelling.   Gastrointestinal:  Negative for abdominal pain, diarrhea, nausea and vomiting.   Genitourinary:  Negative for dysuria and urgency.   Musculoskeletal:  Negative for joint pain and myalgias.   Skin:  Negative for itching and rash.   Neurological:  Negative for dizziness, sensory change, weakness and headaches.   Endo/Heme/Allergies:  Negative for polydipsia. Does not bruise/bleed easily.   Psychiatric/Behavioral:  Negative for depression.      Past Medical History:   Diagnosis Date    Acute congestive heart failure 02/10/2020    Anemia     Bilateral renal cysts     Cataract     Chronic LBP 7/26/2012    Chronic pain     CKD (chronic kidney disease), stage IV     Colon polyp 2013    COPD (chronic obstructive pulmonary disease)     Dehydration     Encounter for blood transfusion     HTN (hypertension)     Lumbar spondylosis     Melanoma     of the lip    Metabolic bone disease     Migraines, neuralgic     Osteoporosis     Primary osteoarthritis of both knees     s/p Rt TKA    Pulmonary embolism with infarction     Seizures 1972    x1 only    Subdeltoid bursitis, L>R. 9/27/2012    Ulcer     Vitamin D deficiency disease          OBJECTIVE:     Vital Signs (Most Recent)  Vitals:    10/18/22 0835 10/18/22 1006 10/18/22 1203 10/18/22 1227   BP: 126/82      BP Location: Right arm      Patient Position: Sitting      Pulse: (!) 128 103 97    Resp: (!) 30   16   Temp: 97.8 °F (36.6 °C)      TempSrc: Oral      SpO2: (!) 92%      Weight:       Height:                  Medications:          Physical Exam  Vitals and nursing note reviewed.   Constitutional:       General: She is not in acute distress.     Appearance: She is ill-appearing. She is not diaphoretic.   HENT:      Head: Normocephalic and atraumatic.      Mouth/Throat:      Pharynx: No oropharyngeal exudate.   Eyes:      General: No scleral icterus.     Conjunctiva/sclera: Conjunctivae normal.      Pupils: Pupils are equal, round, and reactive to light.   Cardiovascular:      Rate and Rhythm: Normal rate and regular rhythm.      Heart sounds: Normal heart sounds. No murmur heard.  Pulmonary:      Effort: Pulmonary effort is normal. No respiratory distress.      Breath sounds: Wheezing present. No rales.   Abdominal:      General: Bowel sounds are normal. There is no distension.      Palpations: Abdomen is soft.      Tenderness: There is no abdominal tenderness.   Musculoskeletal:         General: Normal range of motion.      Cervical back: Normal range of motion and neck supple.      Comments: AVF     Skin:     General: Skin is warm and dry.      Findings: No erythema.   Neurological:      Mental Status: She is alert and oriented to person, place, and time.      Cranial Nerves: No cranial nerve deficit.   Psychiatric:         Mood and Affect: Affect normal.         Cognition and Memory: Memory normal.         Judgment: Judgment normal.       Laboratory:  Recent Labs   Lab 10/16/22  0418 10/17/22  0317 10/18/22  0329   WBC 3.84* 5.58 6.47   HGB 13.0 12.2 12.2   HCT 44.1 39.2 38.1   * 119* 154   MONO 1.0* 7.0 17.0*       Recent Labs   Lab 10/16/22  0418 10/17/22  0317 10/18/22  0329   *  133* 133* 135*   K 4.7  4.7 4.8 4.4     108 110 102   CO2 17*  17* 15* 24   BUN 11  11 15 11   CREATININE 1.4  1.4 1.9* 1.6*   CALCIUM 8.7  8.7 8.7 8.6*   PHOS 1.5* 1.8* 1.5*         Diagnostic Results:  X-Ray: Reviewed  US: Reviewed  Echo: Reviewed  ASSESSMENT/PLAN:     1. ESRD  - HD KAYLEEN Urbina With   Aura Diggs   --  plan for HD  Wednesday   Phos low  has Poor PO intake - stoped Sevelamer replace IV x1   -- Keep MWF after That   -- Daily Renal Function Panel  -- Avoid Hypotension.  -- Renally dose all meds  2. HTN (I10) -  Controlled   3. Anemia of chronic kidney disease treated with BILL (N18.9 D63.1) -      Recent Labs   Lab 10/16/22  0418 10/17/22  0317 10/18/22  0329   HGB 13.0 12.2 12.2   HCT 44.1 39.2 38.1   * 119* 154         Iron   Lab Results   Component Value Date    IRON 13 (L) 02/12/2020    TIBC 462 (H) 02/12/2020    FERRITIN 148 07/17/2019       4. 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    Lab Results   Component Value Date    .0 (H) 12/28/2018    CALCIUM 8.6 (L) 10/18/2022    PHOS 1.5 (L) 10/18/2022     Lab Results   Component Value Date    WDZRTBPQ78HT 26 (L) 02/12/2020     Sevelamer   Lab Results   Component Value Date    CO2 24 10/18/2022       5. Nutrition/Hypoalbuminemia (E88.09) -   Recent Labs   Lab 10/17/22  0317 10/18/22  0329   ALBUMIN 2.8* 2.8*       Nepro with meals TID. Renal vitamins daily      Thank you for the consult, will follow  With any question please call [phone]  Ramo Da Silva MD    [facility]  EDGARD Bustillos MD, FACP,   MJae Diggs MD,   OMD CURTIS Olguin MD E. V. Harmon, NP    [address]  [place], [address] [phone]

## 2022-10-18 NOTE — PLAN OF CARE
Problem: Physical Therapy  Goal: Physical Therapy Goal  Description: Goals to be met by: 22    Patient will increase functional independence with mobility by performin.  Supine to/from sit with Mod Ind  2.  Bed to/from chair with Mod Ind with or without RW  3.  Ambulate 125' with RW with supervision  4.  Up in chair 2 hours    Outcome: Ongoing, Progressing   Continue working toward goals.

## 2022-10-18 NOTE — ASSESSMENT & PLAN NOTE
Advance Care Planning     Date: 10/18/2022    Code Status  In light of the patients advanced and life limiting illness,I engaged the the patient in a conversation about the patient's preferences for care  at the very end of life. The patient wishes to have a natural, peaceful death.  Along those lines, the patient does wish to have CPR and other invasive treatments performed when her heart and/or breathing stops. I communicated to the patient that a FULL code order would be placed in her medical record to reflect this preference.  I spent a total of 10 minutes engaging the patient in this advance care planning discussion.

## 2022-10-18 NOTE — PLAN OF CARE
Problem: Adult Inpatient Plan of Care  Goal: Plan of Care Review  Outcome: Ongoing, Progressing   Chart reviewed.

## 2022-10-18 NOTE — AI DETERIORATION ALERT
Artificial Intelligence Notification  Joseline      Admit Date: 10/11/2022  LOS: 7  Code Status: Full Code   Date of Consult: 10/18/2022  : 1945  Age: 77 y.o.  Weight:   Wt Readings from Last 1 Encounters:   10/13/22 40.8 kg (90 lb)     Sex: female  Bed: K453/K453 A:   MRN: [id number]  Attending Physician: Jennifer Bowles*  Primary Service: Networked reference to record PCT   Time AI Alert Received: 904  Time at Bedside:906           Patient complain of anxiety just thinking about going for dialysis this morning she requests for lorazepam      Vital Signs (Most Recent):  Temp: 97.8 °F (36.6 °C) (10/18/22 0835)  Pulse: (!) 128 (10/18/22 0835)  Resp: (!) 30 (10/18/22 0835)  BP: 126/82 (10/18/22 0835)  SpO2: (!) 92 % (10/18/22 0835)   Vital Signs (24h Range):  Temp:  [96.1 °F (35.6 °C)-98 °F (36.7 °C)] 97.8 °F (36.6 °C)  Pulse:  [100-134] 128  Resp:  [16-30] 30  SpO2:  [92 %-97 %] 92 %  BP: (109-148)/(60-97) 126/82       This encounter was triggered by an Artificial Intelligence Notification.     Artificial Intelligence alert discussed with Primary team:        Evaluation:  Awake and alert, looking very anxious.  Heart rate rang 100-120, started on metoprolol, continue BuSpar add Ativan p.r.n. for HD    Disposition; unchanged

## 2022-10-18 NOTE — PT/OT/SLP PROGRESS
Physical Therapy Treatment    Patient Name:  Katie Quevedo   MRN:  [id number]    Recommendations:     Discharge Recommendations:  home health PT   Discharge Equipment Recommendations: none   Barriers to discharge: decreased functional mobility    Assessment:     Katie Quevedo is a 77 y.o. female admitted with a medical diagnosis of Acute on chronic respiratory failure.  She presents with the following impairments/functional limitations:  weakness, impaired endurance, impaired functional mobility, decreased lower extremity function, pain Pt would continue to benefit from P.T. To address impairments listed above.  .    Rehab Prognosis: Fair; patient would benefit from acute skilled PT services to address these deficits and reach maximum level of function.    Recent Surgery: Procedure(s) (LRB):  EGD (ESOPHAGOGASTRODUODENOSCOPY) (N/A) 1 Day Post-Op    Plan:     During this hospitalization, patient to be seen 6 x/week to address the identified rehab impairments via gait training, therapeutic activities, therapeutic exercises, neuromuscular re-education and progress toward the following goals:    Plan of Care Expires:  11/08/22    Subjective     Patient/Family Comments/goals: Pt agreed to tx  Pain/Comfort:  Pain Rating 1: 8/10  Location - Orientation 1: generalized  Location 1:  (neck and back of head)  Pain Addressed 1: Reposition, Distraction, Nurse notified  Pain Rating Post-Intervention 1: 8/10      Objective:     Communicated with RN prior to session.  Patient found supine with telemetry, PureWick, peripheral IV, bed alarm upon PT entry to room.     General Precautions: Standard, fall, droplet   Orthopedic Precautions:    Braces:    Respiratory Status:     Functional Mobility:  Bed Mobility:     Scooting: contact guard assistance, minimum assistance, and up to HOB    AM-PAC 6 CLICK MOBILITY  Turning over in bed (including adjusting bedclothes, sheets and blankets)?: 3  Sitting down on and standing up from a chair with arms  (e.g., wheelchair, bedside commode, etc.): 3  Moving from lying on back to sitting on the side of the bed?: 3  Moving to and from a bed to a chair (including a wheelchair)?: 3  Need to walk in hospital room?: 3  Climbing 3-5 steps with a railing?: 3  Basic Mobility Total Score: 18       Treatment & Education:  Pt initially declined tx secondary to increased neck and head pain.  Pt agreed to tx in bed, but declined EOB or OOB today.  Assisted pt with scooting up to HOB for improved positioning, and positioned pillows better for improved head posture.  Supine BLE therex: AP x 20 reps, heelslides, hip ABD/ADD, and SAQs all x 12 reps with Konrad/CGA for improved technique.  Pt requested pain medication for neck/head pain.  RN notified.    Patient left supine with all lines intact, call button in reach, bed alarm on, and RN notified..    GOALS:   Multidisciplinary Problems       Physical Therapy Goals          Problem: Physical Therapy    Goal Priority Disciplines Outcome Goal Variances Interventions   Physical Therapy Goal     PT, PT/OT Ongoing, Progressing     Description: Goals to be met by: 22    Patient will increase functional independence with mobility by performin.  Supine to/from sit with Mod Ind  2.  Bed to/from chair with Mod Ind with or without RW  3.  Ambulate 125' with RW with supervision  4.  Up in chair 2 hours                         Time Tracking:     PT Received On: 10/18/22  PT Start Time: 1209     PT Stop Time: 1226  PT Total Time (min): 17 min     Billable Minutes: Therapeutic Exercise 17    Treatment Type: Treatment  PT/PTA: PTA     PTA Visit Number: 1     10/18/2022

## 2022-10-18 NOTE — PLAN OF CARE
Problem: Adult Inpatient Plan of Care  Goal: Plan of Care Review  Outcome: Ongoing, Progressing  Goal: Patient-Specific Goal (Individualized)  Outcome: Ongoing, Progressing  Goal: Absence of Hospital-Acquired Illness or Injury  Outcome: Ongoing, Progressing  Goal: Optimal Comfort and Wellbeing  Outcome: Ongoing, Progressing  Goal: Readiness for Transition of Care  Outcome: Ongoing, Progressing     Problem: COPD (Chronic Obstructive Pulmonary Disease) Comorbidity  Goal: Maintenance of COPD Symptom Control  Outcome: Ongoing, Progressing     Problem: Infection  Goal: Absence of Infection Signs and Symptoms  Outcome: Ongoing, Progressing     Problem: Gas Exchange Impaired  Goal: Optimal Gas Exchange  Outcome: Ongoing, Progressing     Problem: Fall Injury Risk  Goal: Absence of Fall and Fall-Related Injury  Outcome: Ongoing, Progressing     Problem: Device-Related Complication Risk (Hemodialysis)  Goal: Safe, Effective Therapy Delivery  Outcome: Ongoing, Progressing     Problem: Hemodynamic Instability (Hemodialysis)  Goal: Effective Tissue Perfusion  Outcome: Ongoing, Progressing     Problem: Infection (Hemodialysis)  Goal: Absence of Infection Signs and Symptoms  Outcome: Ongoing, Progressing     Problem: Skin Injury Risk Increased  Goal: Skin Health and Integrity  Outcome: Ongoing, Progressing     Problem: Heart Failure Comorbidity  Goal: Maintenance of Heart Failure Symptom Control  Outcome: Ongoing, Progressing     Problem: Hypertension Comorbidity  Goal: Blood Pressure in Desired Range  Outcome: Ongoing, Progressing     Problem: Electrolyte Imbalance (Chronic Kidney Disease)  Goal: Electrolyte Balance  Outcome: Ongoing, Progressing     Problem: Fluid Volume Excess (Chronic Kidney Disease)  Goal: Fluid Balance  Outcome: Ongoing, Progressing     Problem: Hematologic Alteration (Chronic Kidney Disease)  Goal: Absence of Anemia Signs and Symptoms  Outcome: Ongoing, Progressing     Problem: Renal Function Impairment  (Chronic Kidney Disease)  Goal: Minimize Renal Failure Effects  Outcome: Ongoing, Progressing     Problem: Pain Acute  Goal: Acceptable Pain Control and Functional Ability  Outcome: Ongoing, Progressing     Problem: Nausea and Vomiting  Goal: Fluid and Electrolyte Balance  Outcome: Ongoing, Progressing     Problem: Coping Ineffective  Goal: Effective Coping  Outcome: Ongoing, Progressing

## 2022-10-18 NOTE — PLAN OF CARE
Continue OT POC. Patient with tolerance for multiple sitting to standing with handheld assist with handheld assist with heartrate in low 100s bpm. Patient 's granddaughter present during session, educated on ways to instruct patient in safe gentle movements (Ie. Such as ankle pumps, BUE elevation/depression, B shoulder retraction/protraction with incorporation of pursed lip breathing). Recommendation for post acute therapy in event family is unable to provide support (patient endorses that her spouse is currently ill) vs home with home health OT PT services and education/information regarding additional possible hired help supports. Would benefit from a bsc.    Problem: Occupational Therapy  Goal: Occupational Therapy Goal  Description: Goals to be met by: 11/11/2022     Patient will increase functional independence with ADLs by performing:    LE Dressing with Modified [place] inclusive of item retrieval.  Grooming while standing at sink with [place].  Toileting from toilet with [place] for hygiene and clothing management.   Toilet transfer to toilet with [place].  Upper extremity exercise program x8 reps per handout with incorporation of pain free gentle movements, with independence.  100% reciprocation of fall risk reduction recommendations to support ease of transition back to home.    Outcome: Ongoing, Progressing

## 2022-10-18 NOTE — AI DETERIORATION ALERT
Artificial Intelligence Notification  Joseline      Admit Date: 10/11/2022  LOS: 7  Code Status: Full Code   Date of Consult: 10/18/2022  : 1945  Age: 77 y.o.  Weight:   Wt Readings from Last 1 Encounters:   10/13/22 40.8 kg (90 lb)     Sex: female  Bed: [facility]/K453 A:   MRN: [id number]  Attending Physician: Jennifer Bowles*  Primary Service: Networked reference to record PCT   Time AI Alert Received: ***  Time at Bedside: ***           ***      Vital Signs (Most Recent):  Temp: 97.8 °F (36.6 °C) (10/18/22 0835)  Pulse: (!) 128 (10/18/22 0835)  Resp: (!) 30 (10/18/22 0835)  BP: 126/82 (10/18/22 0835)  SpO2: (!) 92 % (10/18/22 0835)   Vital Signs (24h Range):  Temp:  [96.1 °F (35.6 °C)-98 °F (36.7 °C)] 97.8 °F (36.6 °C)  Pulse:  [100-134] 128  Resp:  [16-30] 30  SpO2:  [92 %-97 %] 92 %  BP: (109-148)/(60-97) 126/82         This encounter was triggered by an Artificial Intelligence Notification.     Artificial Intelligence alert discussed with Primary team:  Name ***      Evaluation: ***    Disposition: ***

## 2022-10-18 NOTE — PROGRESS NOTES
"Gastroenterology Progress Note    CC: Hematemesis     Interval history:  No acute events overnight. Patient admits to persistent increased work of breathing as well as coughing. She denies any abdominal pain, nausea or vomiting. She tolerated breakfast without difficulty.     Past Medical History  ESRD on HD MWF  COPD  CKD  HTN  Osteoporosis     Review of Systems  General ROS: negative for chills, fever or weight loss  Cardiovascular ROS: no chest pain or dyspnea on exertion  Gastrointestinal ROS: no abdominal pain, change in bowel habits, or black/ bloody stools    Physical Examination  /82 (BP Location: Right arm, Patient Position: Sitting)   Pulse 103   Temp 97.8 °F (36.6 °C) (Oral)   Resp (!) 30   Ht 5' 2" (1.575 m)   Wt 40.8 kg (90 lb)   LMP  (LMP Unknown)   SpO2 (!) 92%   BMI 16.46 kg/m²   General appearance: alert, cooperative, no distress  HENT: Normocephalic, atraumatic, neck symmetrical, no nasal discharge  Respiratory: Expiratory wheezes present bilaterally, increased work of breathing on O2 via NC, no dullness to percussion bilaterally  Abdomen: soft, non-tender; bowel sounds normoactive; no      Assessment:   77 y.o. woman with history of ESRD on HD MWF and COPD presenting with acute, small-volume hematemesis without associated anemia. He nausea and emesis may have been related to influenza infection, and her symptoms are improving. Hgb stable without need for transfusion.     Plan:  - IV PPI daily   - Advance diet as tolerated  - Monitor for signs/symptoms of recurrent GI blood loss (eg. Hematemesis)  - Endoscopic planning pending improvement in respiratory status     Case discussed with Dr. Bruno.      Jean Hampton MD  [facility] Fellow, PGY-V      "

## 2022-10-19 LAB
ALBUMIN SERPL BCP-MCNC: 2.7 G/DL (ref 3.5–5.2)
ANION GAP SERPL CALC-SCNC: 12 MMOL/L (ref 8–16)
BASOPHILS # BLD AUTO: 0.03 K/UL (ref 0–0.2)
BASOPHILS NFR BLD: 0.3 % (ref 0–1.9)
BUN SERPL-MCNC: 21 MG/DL (ref 8–23)
CALCIUM SERPL-MCNC: 7.7 MG/DL (ref 8.7–10.5)
CHLORIDE SERPL-SCNC: 102 MMOL/L (ref 95–110)
CO2 SERPL-SCNC: 19 MMOL/L (ref 23–29)
CREAT SERPL-MCNC: 2.4 MG/DL (ref 0.5–1.4)
DIFFERENTIAL METHOD: ABNORMAL
EOSINOPHIL # BLD AUTO: 0.3 K/UL (ref 0–0.5)
EOSINOPHIL NFR BLD: 3.4 % (ref 0–8)
ERYTHROCYTE [DISTWIDTH] IN BLOOD BY AUTOMATED COUNT: 13.3 % (ref 11.5–14.5)
EST. GFR  (NO RACE VARIABLE): 20 ML/MIN/1.73 M^2
GLUCOSE SERPL-MCNC: 98 MG/DL (ref 70–110)
HCT VFR BLD AUTO: 33.9 % (ref 37–48.5)
HGB BLD-MCNC: 10.9 G/DL (ref 12–16)
IMM GRANULOCYTES # BLD AUTO: 0.05 K/UL (ref 0–0.04)
IMM GRANULOCYTES NFR BLD AUTO: 0.5 % (ref 0–0.5)
LYMPHOCYTES # BLD AUTO: 1.3 K/UL (ref 1–4.8)
LYMPHOCYTES NFR BLD: 13.9 % (ref 18–48)
MCH RBC QN AUTO: 30.4 PG (ref 27–31)
MCHC RBC AUTO-ENTMCNC: 32.2 G/DL (ref 32–36)
MCV RBC AUTO: 94 FL (ref 82–98)
MONOCYTES # BLD AUTO: 1.1 K/UL (ref 0.3–1)
MONOCYTES NFR BLD: 12.1 % (ref 4–15)
NEUTROPHILS # BLD AUTO: 6.4 K/UL (ref 1.8–7.7)
NEUTROPHILS NFR BLD: 69.8 % (ref 38–73)
NRBC BLD-RTO: 0 /100 WBC
PHOSPHATE SERPL-MCNC: 3.9 MG/DL (ref 2.7–4.5)
PLATELET # BLD AUTO: 205 K/UL (ref 150–450)
PMV BLD AUTO: 10.2 FL (ref 9.2–12.9)
POTASSIUM SERPL-SCNC: 3.8 MMOL/L (ref 3.5–5.1)
RBC # BLD AUTO: 3.59 M/UL (ref 4–5.4)
SODIUM SERPL-SCNC: 133 MMOL/L (ref 136–145)
WBC # BLD AUTO: 9.11 K/UL (ref 3.9–12.7)

## 2022-10-19 PROCEDURE — 63600175 PHARM REV CODE 636 W HCPCS: Performed by: STUDENT IN AN ORGANIZED HEALTH CARE EDUCATION/TRAINING PROGRAM

## 2022-10-19 PROCEDURE — 85025 COMPLETE CBC W/AUTO DIFF WBC: CPT | Performed by: STUDENT IN AN ORGANIZED HEALTH CARE EDUCATION/TRAINING PROGRAM

## 2022-10-19 PROCEDURE — 27000221 HC OXYGEN, UP TO 24 HOURS

## 2022-10-19 PROCEDURE — 27000207 HC ISOLATION

## 2022-10-19 PROCEDURE — 80100016 HC MAINTENANCE HEMODIALYSIS

## 2022-10-19 PROCEDURE — 99900035 HC TECH TIME PER 15 MIN (STAT)

## 2022-10-19 PROCEDURE — 11000001 HC ACUTE MED/SURG PRIVATE ROOM

## 2022-10-19 PROCEDURE — C9113 INJ PANTOPRAZOLE SODIUM, VIA: HCPCS | Performed by: STUDENT IN AN ORGANIZED HEALTH CARE EDUCATION/TRAINING PROGRAM

## 2022-10-19 PROCEDURE — 97530 THERAPEUTIC ACTIVITIES: CPT | Mod: CQ

## 2022-10-19 PROCEDURE — 25000003 PHARM REV CODE 250: Performed by: FAMILY MEDICINE

## 2022-10-19 PROCEDURE — 25000242 PHARM REV CODE 250 ALT 637 W/ HCPCS: Performed by: FAMILY MEDICINE

## 2022-10-19 PROCEDURE — 25000242 PHARM REV CODE 250 ALT 637 W/ HCPCS: Performed by: INTERNAL MEDICINE

## 2022-10-19 PROCEDURE — 25000003 PHARM REV CODE 250: Performed by: INTERNAL MEDICINE

## 2022-10-19 PROCEDURE — C1752 CATH,HEMODIALYSIS,SHORT-TERM: HCPCS

## 2022-10-19 PROCEDURE — 94761 N-INVAS EAR/PLS OXIMETRY MLT: CPT

## 2022-10-19 PROCEDURE — 36415 COLL VENOUS BLD VENIPUNCTURE: CPT | Performed by: STUDENT IN AN ORGANIZED HEALTH CARE EDUCATION/TRAINING PROGRAM

## 2022-10-19 PROCEDURE — 80069 RENAL FUNCTION PANEL: CPT | Performed by: STUDENT IN AN ORGANIZED HEALTH CARE EDUCATION/TRAINING PROGRAM

## 2022-10-19 PROCEDURE — 94640 AIRWAY INHALATION TREATMENT: CPT

## 2022-10-19 PROCEDURE — 25000003 PHARM REV CODE 250: Performed by: STUDENT IN AN ORGANIZED HEALTH CARE EDUCATION/TRAINING PROGRAM

## 2022-10-19 RX ORDER — OSELTAMIVIR PHOSPHATE 30 MG/1
30 CAPSULE ORAL
Qty: 3 CAPSULE | Refills: 0 | Status: SHIPPED | OUTPATIENT
Start: 2022-10-19 | End: 2022-10-27

## 2022-10-19 RX ORDER — METOPROLOL TARTRATE 25 MG/1
25 TABLET, FILM COATED ORAL 2 TIMES DAILY
Qty: 60 TABLET | Refills: 11 | Status: ON HOLD | OUTPATIENT
Start: 2022-10-19 | End: 2022-12-12 | Stop reason: HOSPADM

## 2022-10-19 RX ORDER — IPRATROPIUM BROMIDE AND ALBUTEROL SULFATE 2.5; .5 MG/3ML; MG/3ML
3 SOLUTION RESPIRATORY (INHALATION) ONCE
Status: COMPLETED | OUTPATIENT
Start: 2022-10-19 | End: 2022-10-19

## 2022-10-19 RX ADMIN — SENNOSIDES AND DOCUSATE SODIUM 1 TABLET: 50; 8.6 TABLET ORAL at 08:10

## 2022-10-19 RX ADMIN — BUSPIRONE HYDROCHLORIDE 10 MG: 5 TABLET ORAL at 08:10

## 2022-10-19 RX ADMIN — PANTOPRAZOLE SODIUM 40 MG: 40 INJECTION, POWDER, FOR SOLUTION INTRAVENOUS at 08:10

## 2022-10-19 RX ADMIN — MIRTAZAPINE 15 MG: 7.5 TABLET ORAL at 08:10

## 2022-10-19 RX ADMIN — ATORVASTATIN CALCIUM 80 MG: 40 TABLET, FILM COATED ORAL at 08:10

## 2022-10-19 RX ADMIN — PROCHLORPERAZINE EDISYLATE 5 MG: 5 INJECTION INTRAMUSCULAR; INTRAVENOUS at 10:10

## 2022-10-19 RX ADMIN — DULOXETINE 30 MG: 30 CAPSULE, DELAYED RELEASE ORAL at 08:10

## 2022-10-19 RX ADMIN — HYDROCODONE BITARTRATE AND ACETAMINOPHEN 1 TABLET: 5; 325 TABLET ORAL at 08:10

## 2022-10-19 RX ADMIN — IPRATROPIUM BROMIDE AND ALBUTEROL SULFATE 3 ML: 2.5; .5 SOLUTION RESPIRATORY (INHALATION) at 08:10

## 2022-10-19 RX ADMIN — IPRATROPIUM BROMIDE AND ALBUTEROL SULFATE 3 ML: 2.5; .5 SOLUTION RESPIRATORY (INHALATION) at 02:10

## 2022-10-19 RX ADMIN — LORAZEPAM 0.5 MG: 0.5 TABLET ORAL at 12:10

## 2022-10-19 RX ADMIN — LORAZEPAM 0.5 MG: 0.5 TABLET ORAL at 04:10

## 2022-10-19 RX ADMIN — OSELTAMIVIR PHOSPHATE 30 MG: 30 CAPSULE ORAL at 09:10

## 2022-10-19 RX ADMIN — METOPROLOL TARTRATE 25 MG: 25 TABLET, FILM COATED ORAL at 08:10

## 2022-10-19 RX ADMIN — IPRATROPIUM BROMIDE AND ALBUTEROL SULFATE 3 ML: .5; 3 SOLUTION RESPIRATORY (INHALATION) at 05:10

## 2022-10-19 NOTE — NURSING
Care plans reviewed. No c/o of SOB or chest pains, no acute distress noted. Vitals stable, all medications given as ordered. Patient received dialysis today, patient could not receive entire treatment, Nephrology notified, 422 mL removed off her in tx. Patient was scheduled for discharge, discharge canceled for today, may go home tomorrow. Safety maintained, bed in lowest position, bed alarm on, bed wheels locked, call light in reach. Will continue to monitor.

## 2022-10-19 NOTE — ASSESSMENT & PLAN NOTE
Persistent refractory nausea.  Reported episode of coffee-ground emesis, dark stools on 10/16.  Stool occult blood positive.  Chemical DVT prophylaxis on hold.  Hemoglobin stable.  Continue IV PPI.  Zofran/Phenergan ineffective.  Compazine p.r.n. for nausea control.  CT abdomen pelvis with IV contrast, showed fluid full stomach.  Was unable tolerate p.o. contrast.    GI consult.  Patient was scheduled to get a EGD due to respiratory issues.  Clear liquid diet and tolerating .

## 2022-10-19 NOTE — PLAN OF CARE
[place] - Telemetry      HOME HEALTH ORDERS  FACE TO FACE ENCOUNTER    Patient Name: Katie Quevedo  YOB: 1945    PCP: Kingston Verduzco MD   PCP Address: [address] / [place] [address]  PCP Phone Number: [phone]  PCP Fax: [phone]    Encounter Date: 10/11/22    Admit to Home Health    Diagnoses:  Active Hospital Problems    Diagnosis  POA    *Acute on chronic respiratory failure [J96.20]  Yes    Advance care planning [Z71.89]  Not Applicable    Hematemesis of unknown cause [K92.0]  Yes    severe stage 4 COPD [J44.1]  Yes    Chronic respiratory failure [J96.10]  Yes    Hypotension [I95.9]  No    Influenza A virus present [J10.1]  Yes    Palliative care encounter [Z51.5]  Not Applicable    ESRD (end stage renal disease) on dialysis [N18.6, Z99.2]  Not Applicable     Chronic             Nausea [R11.0]  No    Pulmonary 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 It is not clear to me that her current symptoms are related to her COPD. Recommend continuing trelegy for now & prn nebulizer treatments.         Resolved Hospital Problems    Diagnosis Date Resolved POA    Chronic respiratory failure with hypoxia, on home O2 therapy [J96.11, Z99.81] 10/11/2022 Not Applicable     Chronic     Home Oxygen    Face to face visit with pt regarding their home oxygen.  We have discussed the need for oxygen including continuous use, prn use or night time use (as appropriate for the pt).  We discussed the need for compliance with the therapy. We will do recertifications as necessary.            Follow Up Appointments:  Future Appointments   Date Time Provider Department Center   10/28/2022 10:30 AM Clemencia Gambino MD [facility] IMPRI [place] [facility]   11/2/2022  8:30 AM Maritza Calhoun MD [facility] ORTHO [place]   12/6/2022 10:20 AM Jacinto Henriquez MD [facility] CARDIO [place] [facility]       Allergies:  Review of patient's allergies indicates:   Allergen Reactions     Aspirin      Other reaction(s): hx of ulcers    Tetracycline Swelling     Other reaction(s): Swelling    Penicillins Rash     Other reaction(s): Hives  Other reaction(s): Rash  Other reaction(s): Rash  Other reaction(s): Hives       Medications: Review discharge medications with patient and family and provide education.    Current Facility-Administered Medications   Medication Dose Route Frequency Provider Last Rate Last Admin    0.9%  NaCl infusion   Intravenous PRN Ramo Da Silva MD        acetaminophen tablet 650 mg  650 mg Oral Q6H PRN ARNULFO Ochoa   650 mg at 10/18/22 0836    albuterol-ipratropium 2.5 mg-0.5 mg/3 mL nebulizer solution 3 mL  3 mL Nebulization Q6H WAKE Bruce Wilder, DO   3 mL at 10/18/22 2038    atorvastatin tablet 80 mg  80 mg Oral Daily Bruce Wilder, DO   80 mg at 10/19/22 0805    busPIRone tablet 10 mg  10 mg Oral Daily Bruce Wilder, DO   10 mg at 10/19/22 0805    diphenhydrAMINE capsule 25 mg  25 mg Oral Q6H PRN Bruce Wilder DO        DULoxetine DR capsule 30 mg  30 mg Oral Daily Bruce Wilder, DO   30 mg at 10/19/22 0805    glucagon (human recombinant) injection 1 mg  1 mg Intramuscular PRN Bruce Wilder,         glucose chewable tablet 16 g  16 g Oral PRN Bruce Wilder,         glucose chewable tablet 24 g  24 g Oral PRN Bruce Wilder DO        heparin (porcine) injection 4,100 Units  4,100 Units Intra-Catheter PRN Ramo Da Silva MD   4,100 Units at 10/17/22 1717    HYDROcodone-acetaminophen 5-325 mg per tablet 1 tablet  1 tablet Oral TID PRN Andreia Salazar MD   1 tablet at 10/19/22 0806    LORazepam tablet 0.5 mg  0.5 mg Oral Q12H PRN Jennifer Bowles MD   0.5 mg at 10/19/22 0400    melatonin tablet 6 mg  6 mg Oral Nightly PRN Bruce Wilder, DO   6 mg at 10/18/22 2326    metoprolol tartrate (LOPRESSOR) tablet 25 mg  25 mg Oral BID Jennifer Bowles MD   25 mg at 10/19/22  0805    mirtazapine tablet 15 mg  15 mg Oral QHS Bruce Wilder DO   15 mg at 10/18/22 2105    naloxone 0.4 mg/mL injection 0.02 mg  0.02 mg Intravenous PRN Bruce Wilder DO        oseltamivir capsule 30 mg  30 mg Oral Every Mon, Wed, Fri Andreia Salazar MD   30 mg at 10/17/22 0912    pantoprazole injection 40 mg  40 mg Intravenous BID Andreia Salazar MD   40 mg at 10/19/22 0805    prochlorperazine injection Soln 5 mg  5 mg Intravenous Q6H PRN Andreia Salazar MD   5 mg at 10/17/22 0529    promethazine (PHENERGAN) 12.5 mg in dextrose 5 % 50 mL IVPB  12.5 mg Intravenous Q6H PRN Andreia Salazar MD   Stopped at 10/18/22 2311    senna-docusate 8.6-50 mg per tablet 1 tablet  1 tablet Oral BID Bruce Wilder DO   1 tablet at 10/19/22 0805    simethicone chewable tablet 80 mg  1 tablet Oral QID PRN Bruce Wilder DO   80 mg at 10/16/22 0326    sodium chloride 0.9% bolus 250 mL  250 mL Intravenous PRN Ramo Da Silva MD   Stopped at 10/12/22 2151    sodium chloride 0.9% bolus 250 mL  250 mL Intravenous Once Andreia Salazar MD        sodium chloride 0.9% flush 10 mL  10 mL Intravenous Q12H PRN Bruce Wilder DO         Current Discharge Medication List        START taking these medications    Details   metoprolol tartrate (LOPRESSOR) 25 MG tablet Take 1 tablet (25 mg total) by mouth 2 (two) times daily.  Qty: 60 tablet, Refills: 11    Comments: .      oseltamivir (TAMIFLU) 30 MG capsule Take 1 capsule (30 mg total) by mouth every Mon, Wed, Fri. for 3 days  Qty: 3 capsule, Refills: 0           CONTINUE these medications which have NOT CHANGED    Details   acetaminophen (TYLENOL) 325 MG tablet Take 1 tablet (325 mg total) by mouth every 6 (six) hours as needed for Pain.  Qty: 10 tablet, Refills: 0      albuterol (VENTOLIN HFA) 90 mcg/actuation inhaler Inhale 2 puffs into the lungs every 6 (six) hours as needed for Wheezing or Shortness of Breath. Rescue  Qty: 18 g, Refills: 3     Comments: May substitute for insurance needs  Associated Diagnoses: Centrilobular emphysema      albuterol-ipratropium (DUO-NEB) 2.5 mg-0.5 mg/3 mL nebulizer solution Take 3 mLs by nebulization every 6 (six) hours as needed for Shortness of Breath. Rescue  Qty: 180 mL, Refills: 11      allopurinoL (ZYLOPRIM) 100 MG tablet Take 1 tablet (100 mg total) by mouth on Tuesday, Thursday, Saturday, and Sunday.  Qty: 30 tablet, Refills: 0      amLODIPine (NORVASC) 5 MG tablet Take 5 mg by mouth once daily.      atorvastatin (LIPITOR) 80 MG tablet Take 80 mg by mouth once daily.      busPIRone (BUSPAR) 10 MG tablet Take 10 mg by mouth once daily.      clonazePAM (KLONOPIN) 1 MG tablet Take 1 mg by mouth 2 (two) times daily as needed for Anxiety.      diclofenac sodium (VOLTAREN) 1 % Gel Apply 2 g topically 3 (three) times daily as needed (Apply to painful joints).      diphenhydrAMINE (BENADRYL) 25 mg capsule Take 25 mg by mouth every 6 (six) hours as needed for Itching.      DULoxetine (CYMBALTA) 30 MG capsule Take 1 capsule (30 mg total) by mouth once daily.  Qty: 30 capsule, Refills: 3    Associated Diagnoses: Chronic midline low back pain with right-sided sciatica; Chronic neck pain; Primary osteoarthritis of both hips      epoetin hemant-epbx (RETACRIT) 10,000 unit/mL imjection Inject 0.5 mLs (5,000 Units total) into the skin every Mon, Wed, Fri.      fluticasone-umeclidin-vilanter (TRELEGY ELLIPTA) 200-62.5-25 mcg inhaler Inhale 1 puff into the lungs once daily.  Qty: 60 each, Refills: 11      HYDROcodone-acetaminophen (NORCO)  mg per tablet Take 1 tablet by mouth 3 (three) times daily as needed for Pain.  Qty: 90 tablet, Refills: 0    Comments: Medically necessary for > 7 days due to chronic pain.      megestroL (MEGACE) 40 MG Tab Take 40 mg by mouth once daily.      mirtazapine (REMERON) 15 MG tablet Take 1 tablet (15 mg total) by mouth every evening.  Qty: 30 tablet, Refills: 11      ondansetron 4 mg/2 mL Soln        sevelamer carbonate (RENVELA) 800 mg Tab Take 1 tablet (800 mg total) by mouth after meals as needed.  Qty: 90 tablet, Refills: 11           STOP taking these medications       ciprofloxacin HCl (CIPRO) 250 MG tablet Comments:   Reason for Stopping:         ciprofloxacin HCl (CIPRO) 250 MG tablet Comments:   Reason for Stopping:         fentaNYL (SUBLIMAZE) 50 mcg/mL injection Comments:   Reason for Stopping:         heparin sodium,porcine (HEPARIN, PORCINE,) 1,000 unit/mL injection Comments:   Reason for Stopping:         heparin sodium,porcine (HEPARIN, PORCINE,) 10,000 unit/mL injection Comments:   Reason for Stopping:         levoFLOXacin (LEVAQUIN) 750 MG tablet Comments:   Reason for Stopping:         phenazopyridine (PYRIDIUM) 100 MG tablet Comments:   Reason for Stopping:         PHENYLephrine (ANJUM-SYNEPHRINE) 10 mg/mL injection Comments:   Reason for Stopping:         propofoL (DIPRIVAN) 10 mg/mL infusion Comments:   Reason for Stopping:         vancomycin (VANCOCIN) 1,000 mg injection Comments:   Reason for Stopping:                 I have seen and examined this patient within the last 30 days. My clinical findings that support the need for the home health skilled services and home bound status are the following:no   Weakness/numbness causing balance and gait disturbance due to COPD Exacerbation, Infection, and Weakness/Debility making it taxing to leave home.  Requiring assistive device to leave home due to unsteady gait caused by  COPD Exacerbation, Infection, and Weakness/Debility.     Diet:   cardiac diet        Referrals/ Consults  Physical Therapy to evaluate and treat. Evaluate for home safety and equipment needs; Establish/upgrade home exercise program. Perform / instruct on therapeutic exercises, gait training, transfer training, and Range of Motion.  Occupational Therapy to evaluate and treat. Evaluate home environment for safety and equipment needs. Perform/Instruct on transfers, ADL  training, ROM, and therapeutic exercises.  Aide to provide assistance with personal care, ADLs, and vital signs.    Activities:   activity as tolerated    Nursing:   Agency to admit patient within 24 hours of hospital discharge unless specified on physician order or at patient request    SN to complete comprehensive assessment including routine vital signs. Instruct on disease process and s/s of complications to report to MD. Review/verify medication list sent home with the patient at time of discharge  and instruct patient/caregiver as needed. Frequency may be adjusted depending on start of care date.     Skilled nurse to perform up to 3 visits PRN for symptoms related to diagnosis    Notify MD if SBP > 160 or < 90; DBP > 90 or < 50; HR > 120 or < 50; Temp > 101; O2 < 88%; Other:       Ok to schedule additional visits based on staff availability and patient request on consecutive days within the home health episode.    When multiple disciplines ordered:    Start of Care occurs on Sunday - Wednesday schedule remaining discipline evaluations as ordered on separate consecutive days following the start of care.    Thursday SOC -schedule subsequent evaluations Friday and Monday the following week.     Friday - Saturday SOC - schedule subsequent discipline evaluations on consecutive days starting Monday of the following week.    For all post-discharge communication and subsequent orders please contact patient's primary care physician. If unable to reach primary care physician or do not receive response within 30 minutes, please contact  for clinical staff order clarification    Miscellaneous   Home Oxygen:  Oxygen at 3 L/min nasal canula to be used:  Continuously.    Home Health Aide:  Nursing Three times weekly, Physical Therapy Three times weekly, Occupational Therapy Three times weekly, and Home Health Aide Three times weekly        I certify that this patient is confined to her home and needs intermittent skilled  nursing care, physical therapy, and occupational therapy.

## 2022-10-19 NOTE — PT/OT/SLP PROGRESS
"Physical Therapy Treatment    Patient Name:  Katie Quevedo   MRN:  [id number]    Recommendations:     Discharge Recommendations:   ( PT with family 24/7 supervision and assistance)   Discharge Equipment Recommendations: bedside commode   Barriers to discharge:  decreased functional mobility    Assessment:     Katie Quevedo is a 77 y.o. female admitted with a medical diagnosis of Acute on chronic respiratory failure.  She presents with the following impairments/functional limitations:  weakness, impaired endurance, impaired self care skills, impaired functional mobility, gait instability, impaired balance, decreased coordination, decreased upper extremity function, decreased lower extremity function, decreased safety awareness, decreased ROM, impaired coordination, impaired cardiopulmonary response to activity. Pt took some encouragement to begin session as pt said she was being discharged today. Able to perform ambulation training ~8 ft fwd and ~8 ft bwd and ~4-5 side steps with RW, 3L of O2 donned, requiring CGA/min A. Pt with visible signs of breathlessness but without complaints. Unable to get SpO2 level to register when attempted. Would benefit from continued PT services to increase pt's cardiorespiratory/cardiovascular strength and endurance.    Rehab Prognosis: Fair; patient would benefit from acute skilled PT services to address these deficits and reach maximum level of function.    Recent Surgery: Procedure(s) (LRB):  EGD (ESOPHAGOGASTRODUODENOSCOPY) (N/A) 2 Days Post-Op    Plan:     During this hospitalization, patient to be seen 6 x/week to address the identified rehab impairments via gait training, therapeutic activities, therapeutic exercises, neuromuscular re-education and progress toward the following goals:    Plan of Care Expires:  11/08/22    Subjective     Chief Complaint: None Expressed  Patient/Family Comments/goals: "No, I don't want to work with you. I am going home today".  Pain/Comfort:  Pain " Rating 1:  (Did not complain of pain)  Pain Rating Post-Intervention 1: 0/10      Objective:     Communicated with nurse prior to session.  Patient found left sidelying with bed alarm, oxygen upon PT entry to room.     General Precautions: Standard, fall, droplet   Orthopedic Precautions:N/A   Braces: N/A  Respiratory Status: Nasal cannula, flow 2 L/min     Functional Mobility:  Bed Mobility:     Rolling Right: stand by assistance  Scooting: stand by assistance  Supine to Sit: stand by assistance and contact guard assistance  Sit to Supine: stand by assistance  Transfers:     Sit to Stand:  contact guard assistance with rolling walker  Gait: ~8 ft fwd and ~8 ft bwd  and ~4-5 side steps with RW, 3L of O2 donned, requiring CGA      AM-PAC 6 CLICK MOBILITY  Turning over in bed (including adjusting bedclothes, sheets and blankets)?: 3  Sitting down on and standing up from a chair with arms (e.g., wheelchair, bedside commode, etc.): 3  Moving from lying on back to sitting on the side of the bed?: 3  Moving to and from a bed to a chair (including a wheelchair)?: 3  Need to walk in hospital room?: 3  Climbing 3-5 steps with a railing?: 2  Basic Mobility Total Score: 17       Treatment & Education:  Pt required some encouragement to begin treatment secondary to expressing she was being discharge today. Able to perform ambulation training ~8 ft fwd and ~8 ft bwd and ~4-5 side steps with RW, 3L of O2 donned, requiring CGA. Despite verbal/tactile cueing pt with increased trunk and cervical flexion. Pt with visible breathlessness while ambulating short distance but without verbal complaints. Unable to register SpO2 level upon attempt.    Patient left supine with all lines intact, call button in reach, bed alarm on, and nurse notified..    GOALS:   Multidisciplinary Problems       Physical Therapy Goals          Problem: Physical Therapy    Goal Priority Disciplines Outcome Goal Variances Interventions   Physical Therapy Goal      PT, PT/OT Ongoing, Progressing     Description: Goals to be met by: 22    Patient will increase functional independence with mobility by performin.  Supine to/from sit with Mod Ind  2.  Bed to/from chair with Mod Ind with or without RW  3.  Ambulate 125' with RW with supervision  4.  Up in chair 2 hours                         Time Tracking:     PT Received On: 10/19/22  PT Start Time: 917     PT Stop Time: 943  PT Total Time (min): 26 min     Billable Minutes: Therapeutic Activity 26    Treatment Type: Treatment  PT/PTA: PTA     PTA Visit Number: 2     10/19/2022

## 2022-10-19 NOTE — PLAN OF CARE
"   10/19/22 1557   Post-Acute Status   Post-Acute Authorization Placement;Home Health;Hospice   Post-Acute Placement Status Patient declined/refused   Home Health Status Discharge Plan Changed   Hospice Status Pending post acute provider review/more information requested   Discharge Delays (!) Patient and Family Barriers  (Discussed with pt and family at bedside. Pt is adamant about going home. Daughter reports that she is working and has to be between mother and father. Resources given for private care sitters. Dr. Don met and will place hospice referral for COPD)   Discharge Plan   Discharge Plan A Home;Home with family;Hospice/home     BP (!) 112/59 (BP Location: Right arm, Patient Position: Lying)   Pulse 108   Temp 98.1 °F (36.7 °C) (Oral)   Resp 18   Ht 5' 2" (1.575 m)   Wt 40.8 kg (90 lb)   LMP  (LMP Unknown)   SpO2 (!) 91%   BMI 16.46 kg/m²      albuterol-ipratropium  3 mL Nebulization Q6H WAKE    atorvastatin  80 mg Oral Daily    busPIRone  10 mg Oral Daily    DULoxetine  30 mg Oral Daily    metoprolol tartrate  25 mg Oral BID    mirtazapine  15 mg Oral QHS    pantoprazole  40 mg Intravenous BID    senna-docusate 8.6-50 mg  1 tablet Oral BID    sodium chloride 0.9%  250 mL Intravenous Once       "

## 2022-10-19 NOTE — PLAN OF CARE
[facility]  Department of Hospital Medicine  [address] [place] [address]  [phone] after hours  [phone] fax    HOSPICE  ORDERS    10/19/2022    Admit to Hospice:  Home Service Inpatient Service Home = patient's home.)     Diagnoses:   Active Hospital Problems    Diagnosis  POA    *Acute on chronic respiratory failure [J96.20]  Yes    Advance care planning [Z71.89]  Not Applicable    Hematemesis of unknown cause [K92.0]  Yes    severe stage 4 COPD [J44.1]  Yes    Chronic respiratory failure [J96.10]  Yes    Hypotension [I95.9]  No    Influenza A virus present [J10.1]  Yes    Palliative care encounter [Z51.5]  Not Applicable    ESRD (end stage renal disease) on dialysis [N18.6, Z99.2]  Not Applicable     Chronic             Nausea [R11.0]  No    Pulmonary 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 It is not clear to me that her current symptoms are related to her COPD. Recommend continuing trelegy for now & prn nebulizer treatments.         Resolved Hospital Problems    Diagnosis Date Resolved POA    Chronic respiratory failure with hypoxia, on home O2 therapy [J96.11, Z99.81] 10/11/2022 Not Applicable     Chronic     Home Oxygen    Face to face visit with pt regarding their home oxygen.  We have discussed the need for oxygen including continuous use, prn use or night time use (as appropriate for the pt).  We discussed the need for compliance with the therapy. We will do recertifications as necessary.            Hospice Qualifying Diagnoses: end stage COPD       Patient has a life expectancy < 6 months due to:  Primary Hospice Diagnosis:  End stage COPD  Comorbid Conditions Contributing to Decline: CHF, COPD, ESRD, hypertension, migraines,     Vital Signs: Routine per Hospice Protocol.    Code Status: Full code    Allergies:   Review of patient's allergies indicates:   Allergen Reactions    Aspirin      Other  reaction(s): hx of ulcers    Tetracycline Swelling     Other reaction(s): Swelling    Penicillins Rash     Other reaction(s): Hives  Other reaction(s): Rash  Other reaction(s): Rash  Other reaction(s): Hives       Diet: Cardiac        Activities: As tolerated    Goals of Care Treatment Preferences:  Code Status: Full Code          What is most important right now is to focus on remaining as independent as possible, symptom/pain control, improvement in condition but with limits to invasive therapies.  Accordingly, we have decided that the best plan to meet the patient's goals includes continuing with treatment.      Nursing: Per Hospice Routine.       Oxygen:3L NC      Medications:          Medication List        START taking these medications      metoprolol tartrate 25 MG tablet  Commonly known as: LOPRESSOR  Take 1 tablet (25 mg total) by mouth 2 (two) times daily.     oseltamivir 30 MG capsule  Commonly known as: TAMIFLU  Take 1 capsule (30 mg total) by mouth every Mon, Wed, Fri. for 3 days            CONTINUE taking these medications      acetaminophen 325 MG tablet  Commonly known as: TYLENOL  Take 1 tablet (325 mg total) by mouth every 6 (six) hours as needed for Pain.     albuterol 90 mcg/actuation inhaler  Commonly known as: VENTOLIN HFA  Inhale 2 puffs into the lungs every 6 (six) hours as needed for Wheezing or Shortness of Breath. Rescue     albuterol-ipratropium 2.5 mg-0.5 mg/3 mL nebulizer solution  Commonly known as: DUO-NEB  Take 3 mLs by nebulization every 6 (six) hours as needed for Shortness of Breath. Rescue     allopurinoL 100 MG tablet  Commonly known as: ZYLOPRIM  Take 1 tablet (100 mg total) by mouth on Tuesday, Thursday, Saturday, and Sunday.     amLODIPine 5 MG tablet  Commonly known as: NORVASC  Take 5 mg by mouth once daily.     atorvastatin 80 MG tablet  Commonly known as: LIPITOR  Take 80 mg by mouth once daily.     busPIRone 10 MG tablet  Commonly known as: BUSPAR  Take 10 mg by mouth  once daily.     clonazePAM 1 MG tablet  Commonly known as: KlonoPIN  Take 1 mg by mouth 2 (two) times daily as needed for Anxiety.     diclofenac sodium 1 % Gel  Commonly known as: VOLTAREN  Apply 2 g topically 3 (three) times daily as needed (Apply to painful joints).     diphenhydrAMINE 25 mg capsule  Commonly known as: BENADRYL  Take 25 mg by mouth every 6 (six) hours as needed for Itching.     DULoxetine 30 MG capsule  Commonly known as: CYMBALTA  Take 1 capsule (30 mg total) by mouth once daily.     epoetin hemant-epbx 10,000 unit/mL imjection  Commonly known as: RETACRIT  Inject 0.5 mLs (5,000 Units total) into the skin every Mon, Wed, Fri.     HYDROcodone-acetaminophen  mg per tablet  Commonly known as: NORCO  Take 1 tablet by mouth 3 (three) times daily as needed for Pain.     megestroL 40 MG Tab  Commonly known as: MEGACE  Take 40 mg by mouth once daily.     mirtazapine 15 MG tablet  Commonly known as: REMERON  Take 1 tablet (15 mg total) by mouth every evening.     ondansetron 4 mg/2 mL Soln     sevelamer carbonate 800 mg Tab  Commonly known as: RENVELA  Take 1 tablet (800 mg total) by mouth after meals as needed.     TRELEGY ELLIPTA 200-62.5-25 mcg inhaler  Generic drug: fluticasone-umeclidin-vilanter  Inhale 1 puff into the lungs once daily.            STOP taking these medications      ciprofloxacin HCl 250 MG tablet  Commonly known as: CIPRO     fentaNYL 50 mcg/mL injection  Commonly known as: SUBLIMAZE     heparin (porcine) 1,000 unit/mL injection     heparin (porcine) 10,000 unit/mL injection     levoFLOXacin 750 MG tablet  Commonly known as: LEVAQUIN     phenazopyridine 100 MG tablet  Commonly known as: PYRIDIUM     PHENYLephrine 10 mg/mL injection  Commonly known as: ANJUM-SYNEPHRINE     propofoL 10 mg/mL infusion  Commonly known as: DIPRIVAN     vancomycin 1,000 mg injection  Commonly known as: VANCOCIN                      Future Orders:  Hospice Medical Director may dictate new orders for  comfortable care measures & sign death certificate.        _________________________________  Jennifer Bowles MD  10/19/2022

## 2022-10-19 NOTE — PLAN OF CARE
[facility] - Telemetry  Discharge Final Note    Primary Care Provider: Kingston Verduzco MD    Expected Discharge Date: 10/19/2022    Pharmacist will go over home medications and reasons for medications. VN and bedside nurse to reiterate final discharge instructions.     Cleared from CM . Bedside Nurse and VN notified.      Final Discharge Note (most recent)       Final Note - 10/19/22 0952          Final Note    Assessment Type Final Discharge Note (P)      Anticipated Discharge Disposition Home-Health Care Svc (P)      Hospital Resources/Appts/Education Provided Appointments scheduled and added to AVS;Post-Acute resouces added to AVS (P)         Post-Acute Status    Post-Acute Authorization Home Health (P)      Home Health Status Set-up Complete/Auth obtained (P)      Discharge Delays None known at this time (P)                      Important Message from Medicare  Important Message from Medicare regarding Discharge Appeal Rights: Given to patient/caregiver, Explained to patient/caregiver, Signed/date by patient/caregiver     Date IMM was signed: 10/18/22  Time IMM was signed: 1146    After-discharge care                Home Medical Care       [facility]   Service: Home Health Services    [address] [place] [id number]   Phone: [phone]                     Future Appointments   Date Time Provider Department Center   10/28/2022 10:30 AM Clemencia Gambino MD [facility] IMPRI [facility]   11/2/2022  8:30 AM Maritza Calhoun MD [facility] ORTHO [place]   12/6/2022 10:20 AM Jacinto Henriquez MD [facility] CARDIO [facility]        Medication List        START taking these medications      metoprolol tartrate 25 MG tablet  Commonly known as: LOPRESSOR  Take 1 tablet (25 mg total) by mouth 2 (two) times daily.     oseltamivir 30 MG capsule  Commonly known as: TAMIFLU  Take 1 capsule (30 mg total) by mouth every Mon, Wed, Fri. for 3 days            CONTINUE taking these medications       acetaminophen 325 MG tablet  Commonly known as: TYLENOL  Take 1 tablet (325 mg total) by mouth every 6 (six) hours as needed for Pain.     albuterol 90 mcg/actuation inhaler  Commonly known as: VENTOLIN HFA  Inhale 2 puffs into the lungs every 6 (six) hours as needed for Wheezing or Shortness of Breath. Rescue     albuterol-ipratropium 2.5 mg-0.5 mg/3 mL nebulizer solution  Commonly known as: DUO-NEB  Take 3 mLs by nebulization every 6 (six) hours as needed for Shortness of Breath. Rescue     allopurinoL 100 MG tablet  Commonly known as: ZYLOPRIM  Take 1 tablet (100 mg total) by mouth on Tuesday, Thursday, Saturday, and Sunday.     amLODIPine 5 MG tablet  Commonly known as: NORVASC     atorvastatin 80 MG tablet  Commonly known as: LIPITOR     busPIRone 10 MG tablet  Commonly known as: BUSPAR     clonazePAM 1 MG tablet  Commonly known as: KlonoPIN     diclofenac sodium 1 % Gel  Commonly known as: VOLTAREN  Apply 2 g topically 3 (three) times daily as needed (Apply to painful joints).     diphenhydrAMINE 25 mg capsule  Commonly known as: BENADRYL     DULoxetine 30 MG capsule  Commonly known as: CYMBALTA  Take 1 capsule (30 mg total) by mouth once daily.     epoetin hemant-epbx 10,000 unit/mL imjection  Commonly known as: RETACRIT  Inject 0.5 mLs (5,000 Units total) into the skin every Mon, Wed, Fri.     HYDROcodone-acetaminophen  mg per tablet  Commonly known as: NORCO  Take 1 tablet by mouth 3 (three) times daily as needed for Pain.     megestroL 40 MG Tab  Commonly known as: MEGACE     mirtazapine 15 MG tablet  Commonly known as: REMERON  Take 1 tablet (15 mg total) by mouth every evening.     ondansetron 4 mg/2 mL Soln     sevelamer carbonate 800 mg Tab  Commonly known as: RENVELA  Take 1 tablet (800 mg total) by mouth after meals as needed.     TRELEGY ELLIPTA 200-62.5-25 mcg inhaler  Generic drug: fluticasone-umeclidin-vilanter  Inhale 1 puff into the lungs once daily.            STOP taking these  medications      ciprofloxacin HCl 250 MG tablet  Commonly known as: CIPRO     fentaNYL 50 mcg/mL injection  Commonly known as: SUBLIMAZE     heparin (porcine) 1,000 unit/mL injection     heparin (porcine) 10,000 unit/mL injection     levoFLOXacin 750 MG tablet  Commonly known as: LEVAQUIN     phenazopyridine 100 MG tablet  Commonly known as: PYRIDIUM     PHENYLephrine 10 mg/mL injection  Commonly known as: ANJUM-SYNEPHRINE     propofoL 10 mg/mL infusion  Commonly known as: DIPRIVAN     vancomycin 1,000 mg injection  Commonly known as: VANCOCIN               Where to Get Your Medications        These medications were sent to [facility] [address], [place] [address]      Hours: Mon-Fri, 8a-5:30p Phone: [phone]   metoprolol tartrate 25 MG tablet  oseltamivir 30 MG capsule

## 2022-10-19 NOTE — PROCEDURES
"Patient seen and examined on HD tolerating well. But then got anxiety and asked to stop. Chronic issue.   /78   Pulse 105   Temp 98 °F (36.7 °C) (Tympanic)   Resp 20   Ht 5' 2" (1.575 m)   Wt 40.8 kg (90 lb)   LMP  (LMP Unknown)   SpO2 (!) 90%   BMI 16.46 kg/m²     With any question please call answering service [phone]  Ramo Da Silva MD    [facility]  EDGARD Bustillos MD, FACNEIL ECHEVARRIA MD,   MD CURTIS Benoit MD E. V. Harmon, NP    [id number]  [place], [address]   "

## 2022-10-19 NOTE — PROGRESS NOTES
[facility]                    Pharmacy       Discharge Medication Education    Patient ACCEPTED medication education. Pharmacy has provided education on the name, indication, and possible side effects of the medication(s) prescribed, using teach-back method.     The following medications have also been discussed, during this admission.        Medication List        START taking these medications      metoprolol tartrate 25 MG tablet  Commonly known as: LOPRESSOR  Take 1 tablet (25 mg total) by mouth 2 (two) times daily.     oseltamivir 30 MG capsule  Commonly known as: TAMIFLU  Take 1 capsule (30 mg total) by mouth every Mon, Wed, Fri. for 3 days            CONTINUE taking these medications      acetaminophen 325 MG tablet  Commonly known as: TYLENOL  Take 1 tablet (325 mg total) by mouth every 6 (six) hours as needed for Pain.     albuterol 90 mcg/actuation inhaler  Commonly known as: VENTOLIN HFA  Inhale 2 puffs into the lungs every 6 (six) hours as needed for Wheezing or Shortness of Breath. Rescue     albuterol-ipratropium 2.5 mg-0.5 mg/3 mL nebulizer solution  Commonly known as: DUO-NEB  Take 3 mLs by nebulization every 6 (six) hours as needed for Shortness of Breath. Rescue     allopurinoL 100 MG tablet  Commonly known as: ZYLOPRIM  Take 1 tablet (100 mg total) by mouth on Tuesday, Thursday, Saturday, and Sunday.     amLODIPine 5 MG tablet  Commonly known as: NORVASC     atorvastatin 80 MG tablet  Commonly known as: LIPITOR     busPIRone 10 MG tablet  Commonly known as: BUSPAR     clonazePAM 1 MG tablet  Commonly known as: KlonoPIN     diclofenac sodium 1 % Gel  Commonly known as: VOLTAREN  Apply 2 g topically 3 (three) times daily as needed (Apply to painful joints).     diphenhydrAMINE 25 mg capsule  Commonly known as: BENADRYL     DULoxetine 30 MG capsule  Commonly known as: CYMBALTA  Take 1 capsule (30 mg total) by mouth once daily.     epoetin hemant-epbx 10,000 unit/mL  imjection  Commonly known as: RETACRIT  Inject 0.5 mLs (5,000 Units total) into the skin every Mon, Wed, Fri.     HYDROcodone-acetaminophen  mg per tablet  Commonly known as: NORCO  Take 1 tablet by mouth 3 (three) times daily as needed for Pain.     megestroL 40 MG Tab  Commonly known as: MEGACE     mirtazapine 15 MG tablet  Commonly known as: REMERON  Take 1 tablet (15 mg total) by mouth every evening.     ondansetron 4 mg/2 mL Soln     sevelamer carbonate 800 mg Tab  Commonly known as: RENVELA  Take 1 tablet (800 mg total) by mouth after meals as needed.     TRELEGY ELLIPTA 200-62.5-25 mcg inhaler  Generic drug: fluticasone-umeclidin-vilanter  Inhale 1 puff into the lungs once daily.            STOP taking these medications      ciprofloxacin HCl 250 MG tablet  Commonly known as: CIPRO     fentaNYL 50 mcg/mL injection  Commonly known as: SUBLIMAZE     heparin (porcine) 1,000 unit/mL injection     heparin (porcine) 10,000 unit/mL injection     levoFLOXacin 750 MG tablet  Commonly known as: LEVAQUIN     phenazopyridine 100 MG tablet  Commonly known as: PYRIDIUM     PHENYLephrine 10 mg/mL injection  Commonly known as: ANJUM-SYNEPHRINE     propofoL 10 mg/mL infusion  Commonly known as: DIPRIVAN     vancomycin 1,000 mg injection  Commonly known as: VANCOCIN               Where to Get Your Medications        These medications were sent to [facility] [address], [place] [address]      Hours: Mon-Fri, 8a-5:30p Phone: [phone]   metoprolol tartrate 25 MG tablet  oseltamivir 30 MG capsule          Thank you  Poli Pretty, PharmD

## 2022-10-19 NOTE — PT/OT/SLP PROGRESS
Occupational Therapy      Patient Name:  Katie Quevedo   MRN:  [id number]    Patient not seen today secondary to Dialysis (PM 1215). Will follow-up at later time.    10/19/2022

## 2022-10-19 NOTE — PROGRESS NOTES
[facility] Medicine  Progress Note    Patient Name: Katie Quevedo  MRN: [id number]  Patient Class: IP- Inpatient   Admission Date: 10/11/2022  Length of Stay: 8 days  Attending Physician: Jennifer Bowles*  Primary Care Provider: Kingston Verduzco MD        Subjective:     Principal Problem:Acute on chronic respiratory failure        HPI:  Katie is a 77 y.o. female with past medical history of CHF, COPD, ESRD, hypertension, migraines, PE who presents with complaint of increased shortness of breath, increased cough compared to usual and fever for the last 2 days.  She states that her  recently was diagnosed with the flu.  She is normally on 3 L of oxygen via nasal cannula at home.  Her pulse ox was reading in the 80s today.  She did breathing treatments this morning which she thought maybe helped a little bit.  In the emergency department she did test positive for influenza.  She is also febrile with a T-max of 101°.  She is admitted for acute on chronic respiratory failure in the setting of influenza infection.      Overview/Hospital Course:  No notes on file    Interval History: awake and alert, complain of anxiety due to dialysis-lorazepam p.r.n.  Heart rate elevated-start metoprolol   stop Sevelamer due to low phosphate and replace phosphate x once  Discharge home with home health per patient preference  Complete Tamiflu at home    Review of Systems   Constitutional:  Positive for activity change and appetite change. Negative for fatigue.   Eyes:  Negative for visual disturbance.   Respiratory:  Positive for cough. Negative for shortness of breath.    Cardiovascular:  Negative for chest pain and leg swelling.   Gastrointestinal:  Negative for abdominal pain, diarrhea, nausea and vomiting.   Genitourinary:  Negative for dysuria, frequency and urgency.   Musculoskeletal:  Negative for back pain.   Neurological:  Negative for light-headedness and headaches.   Psychiatric/Behavioral:   Negative for confusion. The patient is nervous/anxious.    Objective:     Vital Signs (Most Recent):  Temp: 98.3 °F (36.8 °C) (10/19/22 0718)  Pulse: 104 (10/19/22 0818)  Resp: 20 (10/19/22 0818)  BP: 133/76 (10/19/22 0718)  SpO2: (!) 94 % (10/19/22 0818) Vital Signs (24h Range):  Temp:  [96.9 °F (36.1 °C)-98.3 °F (36.8 °C)] 98.3 °F (36.8 °C)  Pulse:  [] 104  Resp:  [16-20] 20  SpO2:  [93 %-98 %] 94 %  BP: (116-137)/(68-79) 133/76     Weight: 40.8 kg (90 lb)  Body mass index is 16.46 kg/m².    Intake/Output Summary (Last 24 hours) at 10/19/2022 1006  Last data filed at 10/19/2022 0324  Gross per 24 hour   Intake --   Output 150 ml   Net -150 ml        Physical Exam  Vitals and nursing note reviewed.   Constitutional:       General: She is not in acute distress.     Appearance: She is ill-appearing.      Comments: Elderly, frail appearing  Diffuse muscle wasting noted   HENT:      Head: Normocephalic and atraumatic.      Mouth/Throat:      Mouth: Mucous membranes are dry.   Eyes:      General: No scleral icterus.  Cardiovascular:      Rate and Rhythm: Normal rate and regular rhythm.      Pulses: Normal pulses.      Heart sounds: Normal heart sounds. No murmur heard.  Pulmonary:      Effort: Pulmonary effort is normal. No respiratory distress.      Breath sounds: No wheezing.   Abdominal:      Palpations: Abdomen is soft.      Tenderness: There is no abdominal tenderness.   Musculoskeletal:      Cervical back: Neck supple.      Right lower leg: No edema.      Left lower leg: No edema.   Skin:     General: Skin is warm and dry.      Findings: No bruising or rash.   Neurological:      Mental Status: She is alert and oriented to person, place, and time.      Comments: Moves all extremities voluntarily   Psychiatric:         Mood and Affect: Mood normal.       Significant Labs: A1C: No results for input(s): HGBA1C in the last 4320 hours.  ABGs: No results for input(s): PH, PCO2, HCO3, POCSATURATED, BE, TOTALHB,  COHB, METHB, O2HB, POCFIO2, PO2 in the last 48 hours.  Blood Culture: No results for input(s): LABBLOO in the last 48 hours.  CBC:   Recent Labs   Lab 10/18/22  0329 10/19/22  0328   WBC 6.47 9.11   HGB 12.2 10.9*   HCT 38.1 33.9*    205       CMP:   Recent Labs   Lab 10/18/22  0329 10/19/22  0328   * 133*   K 4.4 3.8    102   CO2 24 19*   GLU 75 98   BUN 11 21   CREATININE 1.6* 2.4*   CALCIUM 8.6* 7.7*   ALBUMIN 2.8* 2.7*   ANIONGAP 9 12       Lipase: No results for input(s): LIPASE in the last 48 hours.  Lipid Panel: No results for input(s): CHOL, HDL, LDLCALC, TRIG, CHOLHDL in the last 48 hours.  Troponin: No results for input(s): TROPONINI, TROPONINIHS in the last 48 hours.  TSH: No results for input(s): TSH in the last 4320 hours.  Urine Culture: No results for input(s): LABURIN in the last 48 hours.  Urine Studies: No results for input(s): COLORU, APPEARANCEUA, PHUR, SPECGRAV, PROTEINUA, GLUCUA, KETONESU, BILIRUBINUA, OCCULTUA, NITRITE, UROBILINOGEN, LEUKOCYTESUR, RBCUA, WBCUA, BACTERIA, SQUAMEPITHEL, HYALINECASTS in the last 48 hours.    Invalid input(s): WRIGHTSUR    Significant Imaging: I have reviewed all pertinent imaging results/findings within the past 24 hours.      Assessment/Plan:      * Acute on chronic respiratory failure  Likely secondary to influenza infection.  Vaccinated for influenza about 4 months ago.  Tamiflu X 5 days    Will complete 5 days of antibiotics (cefepime/vancomycin X 3 days followed by ceftriaxone X 2 days) for suspected secondary bacterial infection given hypotension.  Blood cultures negative.    Continue scheduled inhalers    Reported to be on 2 L oxygen at baseline for COPD, confirmed with patient's .  Wean oxygen as tolerated.      Advance care planning  Advance Care Planning     Date: 10/18/2022    Code Status  In light of the patients advanced and life limiting illness,I engaged the the patient in a conversation about the patient's preferences  for care  at the very end of life. The patient wishes to have a natural, peaceful death.  Along those lines, the patient does wish to have CPR and other invasive treatments performed when her heart and/or breathing stops. I communicated to the patient that a FULL code order would be placed in her medical record to reflect this preference.  I spent a total of 10 minutes engaging the patient in this advance care planning discussion.             Hypotension  Hypotension with systolics in the 60s noted on 10/12  Transiently responded to 1 L IV fluid bolus and was placed on Levophed for a few hours, now off pressors     Blood cultures show no growth till date.  Suspect infectious/sepsis picture. completed 5 days of antibiotics for presumed secondary bacterial infection.  Echocardiogram showed preserved LVEF.      Chronic respiratory failure  On 3 L oxygen at baseline for COPD      Influenza A virus present  will treat influenza with a five-day course of Tamiflu.          ESRD (end stage renal disease) on dialysis  Follows with Dr. Diggs. Nephrology consulted      Nausea  Persistent refractory nausea.  Reported episode of coffee-ground emesis, dark stools on 10/16.  Stool occult blood positive.  Chemical DVT prophylaxis on hold.  Hemoglobin stable.  Continue IV PPI.  Zofran/Phenergan ineffective.  Compazine p.r.n. for nausea control.  CT abdomen pelvis with IV contrast, showed fluid full stomach.  Was unable tolerate p.o. contrast.    GI consult.  Patient was scheduled to get a EGD due to respiratory issues.  Clear liquid diet and tolerating .        VTE Risk Mitigation (From admission, onward)         Ordered     heparin (porcine) injection 4,100 Units  As needed (PRN)         10/12/22 1215     IP VTE HIGH RISK PATIENT  Once         10/11/22 2039     Place sequential compression device  Until discontinued         10/11/22 2039                Discharge Planning   LAITH: 10/19/2022     Code Status: Full Code   Is the patient  medically ready for discharge?: No    Reason for patient still in hospital (select all that apply): Pending disposition  Discharge Plan A: Home, Home with family, Home Health   Discharge Delays: None known at this time              Jennifer Bowles MD  Department of [facility] Medicine   [facility]

## 2022-10-19 NOTE — PLAN OF CARE
Problem: Adult Inpatient Plan of Care  Goal: Plan of Care Review  Outcome: Ongoing, Progressing  Goal: Patient-Specific Goal (Individualized)  Outcome: Ongoing, Progressing  Goal: Absence of Hospital-Acquired Illness or Injury  Outcome: Ongoing, Progressing  Goal: Optimal Comfort and Wellbeing  Outcome: Ongoing, Progressing  Goal: Readiness for Transition of Care  Outcome: Ongoing, Progressing     Problem: COPD (Chronic Obstructive Pulmonary Disease) Comorbidity  Goal: Maintenance of COPD Symptom Control  Outcome: Ongoing, Progressing     Problem: Infection  Goal: Absence of Infection Signs and Symptoms  Outcome: Ongoing, Progressing     Problem: Gas Exchange Impaired  Goal: Optimal Gas Exchange  Outcome: Ongoing, Progressing     Problem: Fall Injury Risk  Goal: Absence of Fall and Fall-Related Injury  Outcome: Ongoing, Progressing     Problem: Device-Related Complication Risk (Hemodialysis)  Goal: Safe, Effective Therapy Delivery  Outcome: Ongoing, Progressing     Problem: Hemodynamic Instability (Hemodialysis)  Goal: Effective Tissue Perfusion  Outcome: Ongoing, Progressing     Problem: Infection (Hemodialysis)  Goal: Absence of Infection Signs and Symptoms  Outcome: Ongoing, Progressing     Problem: Skin Injury Risk Increased  Goal: Skin Health and Integrity  Outcome: Ongoing, Progressing     Problem: Heart Failure Comorbidity  Goal: Maintenance of Heart Failure Symptom Control  Outcome: Ongoing, Progressing     Problem: Hypertension Comorbidity  Goal: Blood Pressure in Desired Range  Outcome: Ongoing, Progressing     Problem: Electrolyte Imbalance (Chronic Kidney Disease)  Goal: Electrolyte Balance  Outcome: Ongoing, Progressing     Problem: Fluid Volume Excess (Chronic Kidney Disease)  Goal: Fluid Balance  Outcome: Ongoing, Progressing     Problem: Hematologic Alteration (Chronic Kidney Disease)  Goal: Absence of Anemia Signs and Symptoms  Outcome: Ongoing, Progressing     Problem: Renal Function Impairment  (Chronic Kidney Disease)  Goal: Minimize Renal Failure Effects  Outcome: Ongoing, Progressing     Problem: Pain Acute  Goal: Acceptable Pain Control and Functional Ability  Outcome: Ongoing, Progressing     Problem: Nausea and Vomiting  Goal: Fluid and Electrolyte Balance  Outcome: Ongoing, Progressing     Problem: Coping Ineffective  Goal: Effective Coping  Outcome: Ongoing, Progressing      denies pain/discomfort

## 2022-10-19 NOTE — PLAN OF CARE
10/19/22 1419   Post-Acute Status   Discharge Delays (!) [facility] Arranged Transportation  (TN discussed with granddaughter via telephone that pt is ready for DC today. She called daughter Chintan and they may  pt at DC as pt's spouse is also recently discharge from hospital. PFC transport requested if family not able to  patient.)   Discharge Plan   Discharge Plan A Home;Home with family;Home Health

## 2022-10-19 NOTE — PLAN OF CARE
Problem: Physical Therapy  Goal: Physical Therapy Goal  Description: Goals to be met by: 22    Patient will increase functional independence with mobility by performin.  Supine to/from sit with Mod Ind  2.  Bed to/from chair with Mod Ind with or without RW  3.  Ambulate 125' with RW with supervision  4.  Up in chair 2 hours    Outcome: Ongoing, Progressing   Pt continues to work toward all goals. Able ambulate ~8 ft fwd and ~8 ft bwd and ~4-5 side steps with RW, 3L of O2 donned, requiring CGA/Min A. Pt with visible breathlessness but without complaints. Unable to register SpO2 level when attempted.

## 2022-10-19 NOTE — PROGRESS NOTES
10/19/22 1252   Vital Signs   Temp 98.1 °F (36.7 °C)   Temp src Tympanic   Pulse 100   Resp (!) 22   /74   During Hemodialysis Assessment   Blood Flow Rate (mL/min) 350 mL/min   Dialysate Flow Rate (mL/min) 600 ml/min   Ultrafiltration Rate (mL/Hr) 428 mL/Hr   Arteriovenous Lines Secure Yes   Arterial Pressure (mmHg) -218 mmHg   Venous Pressure (mmHg) 189   UF Removed (mL) 922 mL   TMP 9   Venous Line in Air Detector Yes   Transducer Dry Yes   Access Visible Yes   Intra-Hemodialysis Comments patient stated could not continue tx. Dr. Diggs/Avinash notified   Post-Hemodialysis Assessment   Rinseback Volume (mL) 250 mL   Blood Volume Processed (Liters) 43.2 L   Dialyzer Clearance Clear   Duration of Treatment 128 minutes   Additional Fluid Intake (mL) 250 mL   Total UF (mL) 922 mL   Net Fluid Removal 422   Patient Response to Treatment unable to tolerate   Post-Hemodialysis Comments Post HD report given to the primary nurse

## 2022-10-19 NOTE — SUBJECTIVE & OBJECTIVE
Interval History: awake and alert, complain of anxiety due to dialysis-lorazepam p.r.n.  Heart rate elevated-start metoprolol   stop Sevelamer due to low phosphate and replace phosphate x once  Discharge home with home health per patient preference  Complete Tamiflu at home    Review of Systems   Constitutional:  Positive for activity change and appetite change. Negative for fatigue.   Eyes:  Negative for visual disturbance.   Respiratory:  Positive for cough. Negative for shortness of breath.    Cardiovascular:  Negative for chest pain and leg swelling.   Gastrointestinal:  Negative for abdominal pain, diarrhea, nausea and vomiting.   Genitourinary:  Negative for dysuria, frequency and urgency.   Musculoskeletal:  Negative for back pain.   Neurological:  Negative for light-headedness and headaches.   Psychiatric/Behavioral:  Negative for confusion. The patient is nervous/anxious.    Objective:     Vital Signs (Most Recent):  Temp: 98.3 °F (36.8 °C) (10/19/22 0718)  Pulse: 104 (10/19/22 0818)  Resp: 20 (10/19/22 0818)  BP: 133/76 (10/19/22 0718)  SpO2: (!) 94 % (10/19/22 0818) Vital Signs (24h Range):  Temp:  [96.9 °F (36.1 °C)-98.3 °F (36.8 °C)] 98.3 °F (36.8 °C)  Pulse:  [] 104  Resp:  [16-20] 20  SpO2:  [93 %-98 %] 94 %  BP: (116-137)/(68-79) 133/76     Weight: 40.8 kg (90 lb)  Body mass index is 16.46 kg/m².    Intake/Output Summary (Last 24 hours) at 10/19/2022 1006  Last data filed at 10/19/2022 0324  Gross per 24 hour   Intake --   Output 150 ml   Net -150 ml        Physical Exam  Vitals and nursing note reviewed.   Constitutional:       General: She is not in acute distress.     Appearance: She is ill-appearing.      Comments: Elderly, frail appearing  Diffuse muscle wasting noted   HENT:      Head: Normocephalic and atraumatic.      Mouth/Throat:      Mouth: Mucous membranes are dry.   Eyes:      General: No scleral icterus.  Cardiovascular:      Rate and Rhythm: Normal rate and regular rhythm.       Pulses: Normal pulses.      Heart sounds: Normal heart sounds. No murmur heard.  Pulmonary:      Effort: Pulmonary effort is normal. No respiratory distress.      Breath sounds: No wheezing.   Abdominal:      Palpations: Abdomen is soft.      Tenderness: There is no abdominal tenderness.   Musculoskeletal:      Cervical back: Neck supple.      Right lower leg: No edema.      Left lower leg: No edema.   Skin:     General: Skin is warm and dry.      Findings: No bruising or rash.   Neurological:      Mental Status: She is alert and oriented to person, place, and time.      Comments: Moves all extremities voluntarily   Psychiatric:         Mood and Affect: Mood normal.       Significant Labs: A1C: No results for input(s): HGBA1C in the last 4320 hours.  ABGs: No results for input(s): PH, PCO2, HCO3, POCSATURATED, BE, TOTALHB, COHB, METHB, O2HB, POCFIO2, PO2 in the last 48 hours.  Blood Culture: No results for input(s): LABBLOO in the last 48 hours.  CBC:   Recent Labs   Lab 10/18/22  0329 10/19/22  0328   WBC 6.47 9.11   HGB 12.2 10.9*   HCT 38.1 33.9*    205       CMP:   Recent Labs   Lab 10/18/22  0329 10/19/22  0328   * 133*   K 4.4 3.8    102   CO2 24 19*   GLU 75 98   BUN 11 21   CREATININE 1.6* 2.4*   CALCIUM 8.6* 7.7*   ALBUMIN 2.8* 2.7*   ANIONGAP 9 12       Lipase: No results for input(s): LIPASE in the last 48 hours.  Lipid Panel: No results for input(s): CHOL, HDL, LDLCALC, TRIG, CHOLHDL in the last 48 hours.  Troponin: No results for input(s): TROPONINI, TROPONINIHS in the last 48 hours.  TSH: No results for input(s): TSH in the last 4320 hours.  Urine Culture: No results for input(s): LABURIN in the last 48 hours.  Urine Studies: No results for input(s): COLORU, APPEARANCEUA, PHUR, SPECGRAV, PROTEINUA, GLUCUA, KETONESU, BILIRUBINUA, OCCULTUA, NITRITE, UROBILINOGEN, LEUKOCYTESUR, RBCUA, WBCUA, BACTERIA, SQUAMEPITHEL, HYALINECASTS in the last 48 hours.    Invalid input(s):  FELIX    Significant Imaging: I have reviewed all pertinent imaging results/findings within the past 24 hours.

## 2022-10-20 VITALS
WEIGHT: 90 LBS | TEMPERATURE: 98 F | DIASTOLIC BLOOD PRESSURE: 58 MMHG | HEIGHT: 62 IN | BODY MASS INDEX: 16.56 KG/M2 | OXYGEN SATURATION: 92 % | RESPIRATION RATE: 22 BRPM | HEART RATE: 102 BPM | SYSTOLIC BLOOD PRESSURE: 127 MMHG

## 2022-10-20 LAB
ALBUMIN SERPL BCP-MCNC: 2.6 G/DL (ref 3.5–5.2)
ANION GAP SERPL CALC-SCNC: 11 MMOL/L (ref 8–16)
BUN SERPL-MCNC: 15 MG/DL (ref 8–23)
CALCIUM SERPL-MCNC: 8.2 MG/DL (ref 8.7–10.5)
CHLORIDE SERPL-SCNC: 101 MMOL/L (ref 95–110)
CO2 SERPL-SCNC: 23 MMOL/L (ref 23–29)
CREAT SERPL-MCNC: 2.4 MG/DL (ref 0.5–1.4)
EST. GFR  (NO RACE VARIABLE): 20 ML/MIN/1.73 M^2
GLUCOSE SERPL-MCNC: 98 MG/DL (ref 70–110)
PHOSPHATE SERPL-MCNC: 3 MG/DL (ref 2.7–4.5)
POTASSIUM SERPL-SCNC: 3.9 MMOL/L (ref 3.5–5.1)
SODIUM SERPL-SCNC: 135 MMOL/L (ref 136–145)

## 2022-10-20 PROCEDURE — 27000221 HC OXYGEN, UP TO 24 HOURS

## 2022-10-20 PROCEDURE — 63600175 PHARM REV CODE 636 W HCPCS: Performed by: STUDENT IN AN ORGANIZED HEALTH CARE EDUCATION/TRAINING PROGRAM

## 2022-10-20 PROCEDURE — 97530 THERAPEUTIC ACTIVITIES: CPT | Mod: CQ

## 2022-10-20 PROCEDURE — 25000242 PHARM REV CODE 250 ALT 637 W/ HCPCS: Performed by: INTERNAL MEDICINE

## 2022-10-20 PROCEDURE — 36415 COLL VENOUS BLD VENIPUNCTURE: CPT | Performed by: STUDENT IN AN ORGANIZED HEALTH CARE EDUCATION/TRAINING PROGRAM

## 2022-10-20 PROCEDURE — C1752 CATH,HEMODIALYSIS,SHORT-TERM: HCPCS

## 2022-10-20 PROCEDURE — 80069 RENAL FUNCTION PANEL: CPT | Performed by: STUDENT IN AN ORGANIZED HEALTH CARE EDUCATION/TRAINING PROGRAM

## 2022-10-20 PROCEDURE — 94640 AIRWAY INHALATION TREATMENT: CPT

## 2022-10-20 PROCEDURE — 25000003 PHARM REV CODE 250: Performed by: FAMILY MEDICINE

## 2022-10-20 PROCEDURE — 25000003 PHARM REV CODE 250: Performed by: STUDENT IN AN ORGANIZED HEALTH CARE EDUCATION/TRAINING PROGRAM

## 2022-10-20 PROCEDURE — C9113 INJ PANTOPRAZOLE SODIUM, VIA: HCPCS | Performed by: STUDENT IN AN ORGANIZED HEALTH CARE EDUCATION/TRAINING PROGRAM

## 2022-10-20 PROCEDURE — 25000003 PHARM REV CODE 250: Performed by: INTERNAL MEDICINE

## 2022-10-20 PROCEDURE — 97530 THERAPEUTIC ACTIVITIES: CPT | Mod: CO

## 2022-10-20 PROCEDURE — 97535 SELF CARE MNGMENT TRAINING: CPT | Mod: CO

## 2022-10-20 PROCEDURE — 94761 N-INVAS EAR/PLS OXIMETRY MLT: CPT

## 2022-10-20 RX ORDER — LORAZEPAM 0.5 MG/1
0.5 TABLET ORAL EVERY 12 HOURS PRN
Qty: 30 TABLET | Refills: 0 | Status: SHIPPED | OUTPATIENT
Start: 2022-10-20 | End: 2022-11-01 | Stop reason: SDUPTHER

## 2022-10-20 RX ADMIN — IPRATROPIUM BROMIDE AND ALBUTEROL SULFATE 3 ML: 2.5; .5 SOLUTION RESPIRATORY (INHALATION) at 07:10

## 2022-10-20 RX ADMIN — HYDROCODONE BITARTRATE AND ACETAMINOPHEN 1 TABLET: 5; 325 TABLET ORAL at 12:10

## 2022-10-20 RX ADMIN — METOPROLOL TARTRATE 25 MG: 25 TABLET, FILM COATED ORAL at 08:10

## 2022-10-20 RX ADMIN — BUSPIRONE HYDROCHLORIDE 10 MG: 5 TABLET ORAL at 08:10

## 2022-10-20 RX ADMIN — PANTOPRAZOLE SODIUM 40 MG: 40 INJECTION, POWDER, FOR SOLUTION INTRAVENOUS at 08:10

## 2022-10-20 RX ADMIN — ATORVASTATIN CALCIUM 80 MG: 40 TABLET, FILM COATED ORAL at 08:10

## 2022-10-20 RX ADMIN — LORAZEPAM 0.5 MG: 0.5 TABLET ORAL at 10:10

## 2022-10-20 RX ADMIN — IPRATROPIUM BROMIDE AND ALBUTEROL SULFATE 3 ML: 2.5; .5 SOLUTION RESPIRATORY (INHALATION) at 01:10

## 2022-10-20 RX ADMIN — DULOXETINE 30 MG: 30 CAPSULE, DELAYED RELEASE ORAL at 08:10

## 2022-10-20 RX ADMIN — SENNOSIDES AND DOCUSATE SODIUM 1 TABLET: 50; 8.6 TABLET ORAL at 08:10

## 2022-10-20 NOTE — PROGRESS NOTES
[facility] Medicine  Progress Note    Patient Name: Katie Quevedo  MRN: [id number]  Patient Class: IP- Inpatient   Admission Date: 10/11/2022  Length of Stay: 9 days  Attending Physician: Jennifer Bowles*  Primary Care Provider: Kingston Verduzco MD        Subjective:     Principal Problem:Acute on chronic respiratory failure        HPI:  Katie is a 77 y.o. female with past medical history of CHF, COPD, ESRD, hypertension, migraines, PE who presents with complaint of increased shortness of breath, increased cough compared to usual and fever for the last 2 days.  She states that her  recently was diagnosed with the flu.  She is normally on 3 L of oxygen via nasal cannula at home.  Her pulse ox was reading in the 80s today.  She did breathing treatments this morning which she thought maybe helped a little bit.  In the emergency department she did test positive for influenza.  She is also febrile with a T-max of 101°.  She is admitted for acute on chronic respiratory failure in the setting of influenza infection.      Overview/Hospital Course:  No notes on file    Interval History: awake and alert, no new complaint. Patient and family provided hospice education to patient and duaghter and are quite open to possible home hospice.     Review of Systems   Constitutional:  Positive for activity change and appetite change. Negative for fatigue.   Eyes:  Negative for visual disturbance.   Respiratory:  Positive for cough. Negative for shortness of breath.    Cardiovascular:  Negative for chest pain and leg swelling.   Gastrointestinal:  Negative for abdominal pain, diarrhea, nausea and vomiting.   Genitourinary:  Negative for dysuria, frequency and urgency.   Musculoskeletal:  Negative for back pain.   Neurological:  Negative for light-headedness and headaches.   Psychiatric/Behavioral:  Negative for confusion. The patient is nervous/anxious.    Objective:     Vital Signs (Most Recent):  Temp: 96.5  °F (35.8 °C) (10/20/22 0730)  Pulse: 103 (10/20/22 0736)  Resp: 16 (10/20/22 0736)  BP: 139/74 (10/20/22 0730)  SpO2: (!) 92 % (10/20/22 0736) Vital Signs (24h Range):  Temp:  [96.5 °F (35.8 °C)-98.5 °F (36.9 °C)] 96.5 °F (35.8 °C)  Pulse:  [] 103  Resp:  [16-22] 16  SpO2:  [90 %-95 %] 92 %  BP: (100-144)/(59-78) 139/74     Weight: 40.8 kg (90 lb)  Body mass index is 16.46 kg/m².    Intake/Output Summary (Last 24 hours) at 10/20/2022 0824  Last data filed at 10/19/2022 1252  Gross per 24 hour   Intake 250 ml   Output 922 ml   Net -672 ml        Physical Exam  Vitals and nursing note reviewed.   Constitutional:       General: She is not in acute distress.     Appearance: She is ill-appearing.      Comments: Elderly, frail appearing  Diffuse muscle wasting noted   HENT:      Head: Normocephalic and atraumatic.      Mouth/Throat:      Mouth: Mucous membranes are dry.   Eyes:      General: No scleral icterus.  Cardiovascular:      Rate and Rhythm: Normal rate and regular rhythm.      Pulses: Normal pulses.      Heart sounds: Normal heart sounds. No murmur heard.  Pulmonary:      Effort: Pulmonary effort is normal. No respiratory distress.      Breath sounds: No wheezing.   Abdominal:      Palpations: Abdomen is soft.      Tenderness: There is no abdominal tenderness.   Musculoskeletal:      Cervical back: Neck supple.      Right lower leg: No edema.      Left lower leg: No edema.   Skin:     General: Skin is warm and dry.      Findings: No bruising or rash.   Neurological:      Mental Status: She is alert and oriented to person, place, and time.      Comments: Moves all extremities voluntarily   Psychiatric:         Mood and Affect: Mood normal.       Significant Labs: A1C: No results for input(s): HGBA1C in the last 4320 hours.  ABGs: No results for input(s): PH, PCO2, HCO3, POCSATURATED, BE, TOTALHB, COHB, METHB, O2HB, POCFIO2, PO2 in the last 48 hours.  Blood Culture: No results for input(s): LABBLOO in the  last 48 hours.  CBC:   Recent Labs   Lab 10/19/22  0328   WBC 9.11   HGB 10.9*   HCT 33.9*          CMP:   Recent Labs   Lab 10/19/22  0328 10/20/22  0202   * 135*   K 3.8 3.9    101   CO2 19* 23   GLU 98 98   BUN 21 15   CREATININE 2.4* 2.4*   CALCIUM 7.7* 8.2*   ALBUMIN 2.7* 2.6*   ANIONGAP 12 11       Lipase: No results for input(s): LIPASE in the last 48 hours.  Lipid Panel: No results for input(s): CHOL, HDL, LDLCALC, TRIG, CHOLHDL in the last 48 hours.  Troponin: No results for input(s): TROPONINI, TROPONINIHS in the last 48 hours.  TSH: No results for input(s): TSH in the last 4320 hours.  Urine Culture: No results for input(s): LABURIN in the last 48 hours.  Urine Studies: No results for input(s): COLORU, APPEARANCEUA, PHUR, SPECGRAV, PROTEINUA, GLUCUA, KETONESU, BILIRUBINUA, OCCULTUA, NITRITE, UROBILINOGEN, LEUKOCYTESUR, RBCUA, WBCUA, BACTERIA, SQUAMEPITHEL, HYALINECASTS in the last 48 hours.    Invalid input(s): WRIGHTSUR    Significant Imaging: I have reviewed all pertinent imaging results/findings within the past 24 hours.      Assessment/Plan:      * Acute on chronic respiratory failure  Likely secondary to influenza infection.  Vaccinated for influenza about 4 months ago.  Tamiflu X 5 days    Will complete 5 days of antibiotics (cefepime/vancomycin X 3 days followed by ceftriaxone X 2 days) for suspected secondary bacterial infection given hypotension.  Blood cultures negative.    Continue scheduled inhalers    Reported to be on 2 L oxygen at baseline for COPD, confirmed with patient's .  Wean oxygen as tolerated.      Advance care planning  Advance Care Planning     Date: 10/18/2022    Code Status  In light of the patients advanced and life limiting illness,I engaged the the patient in a conversation about the patient's preferences for care  at the very end of life. The patient wishes to have a natural, peaceful death.  Along those lines, the patient does wish to have CPR  and other invasive treatments performed when her heart and/or breathing stops. I communicated to the patient that a FULL code order would be placed in her medical record to reflect this preference.  I spent a total of 10 minutes engaging the patient in this advance care planning discussion.       Advance Care Planning     Date: 10/20/2022    [facility]  I engaged the patient and family in a conversation about advance care planning and we specifically addressed what the goals of care would be moving forward, in light of the patient's change in clinical status, specifically progressive worsening respiratory status.  We did specifically address the patient's likely prognosis, which is fair .  We explored the patient's values and preferences for future care.  The patient and family endorses that what is most important right now is to focus on spending time at home, avoiding the hospital, remaining as independent as possible and improvement in condition but with limits to invasive therapies    Accordingly, we have decided that the best plan to meet the patient's goals includes continuing with treatment    I did explain the role for hospice care at this stage of the patient's illness, including its ability to help the patient live with the best quality of life possible.  We will be making a hospice referral.    I spent a total of 20 minutes engaging the patient in this advance care planning discussion.               Hypotension  Hypotension with systolics in the 60s noted on 10/12  Transiently responded to 1 L IV fluid bolus and was placed on Levophed for a few hours, now off pressors     Blood cultures show no growth till date.  Suspect infectious/sepsis picture. completed 5 days of antibiotics for presumed secondary bacterial infection.  Echocardiogram showed preserved LVEF.      Chronic respiratory failure  On 3 L oxygen at baseline for COPD      Influenza A virus present  will treat influenza with a five-day course of  Tamiflu.          ESRD (end stage renal disease) on dialysis  Follows with Dr. Diggs. Nephrology consulted      Nausea  Persistent refractory nausea.  Reported episode of coffee-ground emesis, dark stools on 10/16.  Stool occult blood positive.  Chemical DVT prophylaxis on hold.  Hemoglobin stable.  Continue IV PPI.  Zofran/Phenergan ineffective.  Compazine p.r.n. for nausea control.  CT abdomen pelvis with IV contrast, showed fluid full stomach.  Was unable tolerate p.o. contrast.    GI consult.  Patient was scheduled to get a EGD due to respiratory issues.  Clear liquid diet and tolerating .        VTE Risk Mitigation (From admission, onward)         Ordered     heparin (porcine) injection 4,100 Units  As needed (PRN)         10/12/22 1215     IP VTE HIGH RISK PATIENT  Once         10/11/22 2039     Place sequential compression device  Until discontinued         10/11/22 2039                Discharge Planning   LAITH: 10/20/2022     Code Status: Full Code   Is the patient medically ready for discharge?: No    Reason for patient still in hospital (select all that apply): Pending disposition  Discharge Plan A: Home, Home with family, Hospice/home   Discharge Delays: (!) Patient and Family Barriers (Discussed with pt and family at bedside. Pt is adamant about going home. Daughter reports that she is working and has to be between mother and father. Resources given for private care sitters. Dr. Don met and will place hospice referral for COPD)              Jennifer Bowles MD  Department of Hospital Medicine   [facility]

## 2022-10-20 NOTE — PROGRESS NOTES
Nephrology Progress Note       Consult Requested By: Jennifer Bowles*  Reason for Consult: ESRD     SUBJECTIVE:        Review of Systems   Constitutional:  Negative for chills, fever and malaise/fatigue.   HENT:  Negative for congestion and sore throat.    Eyes:  Negative for blurred vision, double vision and photophobia.   Respiratory:  Positive for cough. Negative for shortness of breath.    Cardiovascular:  Negative for chest pain, palpitations and leg swelling.   Gastrointestinal:  Negative for abdominal pain, diarrhea, nausea and vomiting.   Genitourinary:  Negative for dysuria and urgency.   Musculoskeletal:  Negative for joint pain and myalgias.   Skin:  Negative for itching and rash.   Neurological:  Negative for dizziness, sensory change, weakness and headaches.   Endo/Heme/Allergies:  Negative for polydipsia. Does not bruise/bleed easily.   Psychiatric/Behavioral:  Negative for depression.      Past Medical History:   Diagnosis Date    Acute congestive heart failure 02/10/2020    Anemia     Bilateral renal cysts     Cataract     Chronic LBP 7/26/2012    Chronic pain     CKD (chronic kidney disease), stage IV     Colon polyp 2013    COPD (chronic obstructive pulmonary disease)     Dehydration     Encounter for blood transfusion     HTN (hypertension)     Lumbar spondylosis     Melanoma     of the lip    Metabolic bone disease     Migraines, neuralgic     Osteoporosis     Primary osteoarthritis of both knees     s/p Rt TKA    Pulmonary embolism with infarction     Seizures 1972    x1 only    Subdeltoid bursitis, L>R. 9/27/2012    Ulcer     Vitamin D deficiency disease          OBJECTIVE:     Vital Signs (Most Recent)  Vitals:    10/20/22 1108 10/20/22 1118 10/20/22 1212 10/20/22 1215   BP:  (!) 127/58     BP Location:  Right arm     Patient Position:  Sitting     Pulse:  97  98   Resp:  20 18    Temp:  97.6 °F (36.4 °C)     TempSrc:  Oral     SpO2: (!) 92% (!) 91%     Weight:       Height:                  Medications:          Physical Exam  Vitals and nursing note reviewed.   Constitutional:       General: She is not in acute distress.     Appearance: She is ill-appearing. She is not diaphoretic.   HENT:      Head: Normocephalic and atraumatic.      Mouth/Throat:      Pharynx: No oropharyngeal exudate.   Eyes:      General: No scleral icterus.     Conjunctiva/sclera: Conjunctivae normal.      Pupils: Pupils are equal, round, and reactive to light.   Cardiovascular:      Rate and Rhythm: Normal rate and regular rhythm.      Heart sounds: Normal heart sounds. No murmur heard.  Pulmonary:      Effort: Pulmonary effort is normal. No respiratory distress.      Breath sounds: Wheezing present. No rales.   Abdominal:      General: Bowel sounds are normal. There is no distension.      Palpations: Abdomen is soft.      Tenderness: There is no abdominal tenderness.   Musculoskeletal:         General: Normal range of motion.      Cervical back: Normal range of motion and neck supple.      Comments: AVF     Skin:     General: Skin is warm and dry.      Findings: No erythema.   Neurological:      Mental Status: She is alert and oriented to person, place, and time.      Cranial Nerves: No cranial nerve deficit.   Psychiatric:         Mood and Affect: Affect normal.         Cognition and Memory: Memory normal.         Judgment: Judgment normal.       Laboratory:  Recent Labs   Lab 10/17/22  0317 10/18/22  0329 10/19/22  0328   WBC 5.58 6.47 9.11   HGB 12.2 12.2 10.9*   HCT 39.2 38.1 33.9*   * 154 205   MONO 7.0 17.0* 12.1  1.1*       Recent Labs   Lab 10/18/22  0329 10/19/22  0328 10/20/22  0202   * 133* 135*   K 4.4 3.8 3.9    102 101   CO2 24 19* 23   BUN 11 21 15   CREATININE 1.6* 2.4* 2.4*   CALCIUM 8.6* 7.7* 8.2*   PHOS 1.5* 3.9 3.0         Diagnostic Results:  X-Ray: Reviewed  US: Reviewed  Echo: Reviewed  ASSESSMENT/PLAN:     1. ESRD  - HD [facility] With Dr Aura Diggs   -- Pending  discharge HD outpatient tomorrow   Need Xanax 0.5 to be taken minutes prior HD as well   -- Keep MWF after That   -- Daily Renal Function Panel  -- Avoid Hypotension.  -- Renally dose all meds  2. HTN (I10) -  Controlled   3. Anemia of chronic kidney disease treated with BILL (N18.9 D63.1) -      Recent Labs   Lab 10/17/22  0317 10/18/22  0329 10/19/22  0328   HGB 12.2 12.2 10.9*   HCT 39.2 38.1 33.9*   * 154 205         Iron   Lab Results   Component Value Date    IRON 13 (L) 02/12/2020    TIBC 462 (H) 02/12/2020    FERRITIN 148 07/17/2019       4. MBD (E88.9 M90.80) -  Recent Labs   Lab 10/20/22  0202   CALCIUM 8.2*   PHOS 3.0       No results for input(s): MG in the last 168 hours.    Lab Results   Component Value Date    .0 (H) 12/28/2018    CALCIUM 8.2 (L) 10/20/2022    PHOS 3.0 10/20/2022     Lab Results   Component Value Date    PKISJURU32DN 26 (L) 02/12/2020     Sevelamer   Lab Results   Component Value Date    CO2 23 10/20/2022       5. Nutrition/Hypoalbuminemia (E88.09) -   Recent Labs   Lab 10/19/22  0328 10/20/22  0202   ALBUMIN 2.7* 2.6*       Nepro with meals TID. Renal vitamins daily      Thank you for the consult, will follow  With any question please call [phone]  Ramo Da Silva MD    [facility]  EDGARD Bustillos MD, FACP,   NEIL Diggs MD,   MD CURTIS Benoit MD E. V. Harmon, NP    [address]  [place], [address] [phone]

## 2022-10-20 NOTE — PLAN OF CARE
VN Note: Labs, notes, orders, care plan review will continue to monitor and be available.   Problem: Adult Inpatient Plan of Care  Goal: Plan of Care Review  Outcome: Ongoing, Progressing

## 2022-10-20 NOTE — SUBJECTIVE & OBJECTIVE
Interval History: awake and alert, no new complaint. Patient and family provided hospice education to patient and shareeer and are quite open to possible home hospice.     Review of Systems   Constitutional:  Positive for activity change and appetite change. Negative for fatigue.   Eyes:  Negative for visual disturbance.   Respiratory:  Positive for cough. Negative for shortness of breath.    Cardiovascular:  Negative for chest pain and leg swelling.   Gastrointestinal:  Negative for abdominal pain, diarrhea, nausea and vomiting.   Genitourinary:  Negative for dysuria, frequency and urgency.   Musculoskeletal:  Negative for back pain.   Neurological:  Negative for light-headedness and headaches.   Psychiatric/Behavioral:  Negative for confusion. The patient is nervous/anxious.    Objective:     Vital Signs (Most Recent):  Temp: 96.5 °F (35.8 °C) (10/20/22 0730)  Pulse: 103 (10/20/22 0736)  Resp: 16 (10/20/22 0736)  BP: 139/74 (10/20/22 0730)  SpO2: (!) 92 % (10/20/22 0736) Vital Signs (24h Range):  Temp:  [96.5 °F (35.8 °C)-98.5 °F (36.9 °C)] 96.5 °F (35.8 °C)  Pulse:  [] 103  Resp:  [16-22] 16  SpO2:  [90 %-95 %] 92 %  BP: (100-144)/(59-78) 139/74     Weight: 40.8 kg (90 lb)  Body mass index is 16.46 kg/m².    Intake/Output Summary (Last 24 hours) at 10/20/2022 0824  Last data filed at 10/19/2022 1252  Gross per 24 hour   Intake 250 ml   Output 922 ml   Net -672 ml        Physical Exam  Vitals and nursing note reviewed.   Constitutional:       General: She is not in acute distress.     Appearance: She is ill-appearing.      Comments: Elderly, frail appearing  Diffuse muscle wasting noted   HENT:      Head: Normocephalic and atraumatic.      Mouth/Throat:      Mouth: Mucous membranes are dry.   Eyes:      General: No scleral icterus.  Cardiovascular:      Rate and Rhythm: Normal rate and regular rhythm.      Pulses: Normal pulses.      Heart sounds: Normal heart sounds. No murmur heard.  Pulmonary:       Effort: Pulmonary effort is normal. No respiratory distress.      Breath sounds: No wheezing.   Abdominal:      Palpations: Abdomen is soft.      Tenderness: There is no abdominal tenderness.   Musculoskeletal:      Cervical back: Neck supple.      Right lower leg: No edema.      Left lower leg: No edema.   Skin:     General: Skin is warm and dry.      Findings: No bruising or rash.   Neurological:      Mental Status: She is alert and oriented to person, place, and time.      Comments: Moves all extremities voluntarily   Psychiatric:         Mood and Affect: Mood normal.       Significant Labs: A1C: No results for input(s): HGBA1C in the last 4320 hours.  ABGs: No results for input(s): PH, PCO2, HCO3, POCSATURATED, BE, TOTALHB, COHB, METHB, O2HB, POCFIO2, PO2 in the last 48 hours.  Blood Culture: No results for input(s): LABBLOO in the last 48 hours.  CBC:   Recent Labs   Lab 10/19/22  0328   WBC 9.11   HGB 10.9*   HCT 33.9*          CMP:   Recent Labs   Lab 10/19/22  0328 10/20/22  0202   * 135*   K 3.8 3.9    101   CO2 19* 23   GLU 98 98   BUN 21 15   CREATININE 2.4* 2.4*   CALCIUM 7.7* 8.2*   ALBUMIN 2.7* 2.6*   ANIONGAP 12 11       Lipase: No results for input(s): LIPASE in the last 48 hours.  Lipid Panel: No results for input(s): CHOL, HDL, LDLCALC, TRIG, CHOLHDL in the last 48 hours.  Troponin: No results for input(s): TROPONINI, TROPONINIHS in the last 48 hours.  TSH: No results for input(s): TSH in the last 4320 hours.  Urine Culture: No results for input(s): LABURIN in the last 48 hours.  Urine Studies: No results for input(s): COLORU, APPEARANCEUA, PHUR, SPECGRAV, PROTEINUA, GLUCUA, KETONESU, BILIRUBINUA, OCCULTUA, NITRITE, UROBILINOGEN, LEUKOCYTESUR, RBCUA, WBCUA, BACTERIA, SQUAMEPITHEL, HYALINECASTS in the last 48 hours.    Invalid input(s): WRIGHTSUR    Significant Imaging: I have reviewed all pertinent imaging results/findings within the past 24 hours.

## 2022-10-20 NOTE — ASSESSMENT & PLAN NOTE
Advance Care Planning     Date: 10/18/2022    Code Status  In light of the patients advanced and life limiting illness,I engaged the the patient in a conversation about the patient's preferences for care  at the very end of life. The patient wishes to have a natural, peaceful death.  Along those lines, the patient does wish to have CPR and other invasive treatments performed when her heart and/or breathing stops. I communicated to the patient that a FULL code order would be placed in her medical record to reflect this preference.  I spent a total of 10 minutes engaging the patient in this advance care planning discussion.        Advance Care Planning     Date: 10/20/2022    [facility]  I engaged the patient and family in a conversation about advance care planning and we specifically addressed what the goals of care would be moving forward, in light of the patient's change in clinical status, specifically progressive worsening respiratory status.  We did specifically address the patient's likely prognosis, which is fair .  We explored the patient's values and preferences for future care.  The patient and family endorses that what is most important right now is to focus on spending time at home, avoiding the hospital, remaining as independent as possible and improvement in condition but with limits to invasive therapies    Accordingly, we have decided that the best plan to meet the patient's goals includes continuing with treatment    I did explain the role for hospice care at this stage of the patient's illness, including its ability to help the patient live with the best quality of life possible.  We will be making a hospice referral.    I spent a total of 20 minutes engaging the patient in this advance care planning discussion.

## 2022-10-20 NOTE — PLAN OF CARE
Problem: COPD (Chronic Obstructive Pulmonary Disease) Comorbidity  Goal: Maintenance of COPD Symptom Control  Outcome: Ongoing, Progressing     Problem: Heart Failure Comorbidity  Goal: Maintenance of Heart Failure Symptom Control  Outcome: Ongoing, Progressing     Problem: Hypertension Comorbidity  Goal: Blood Pressure in Desired Range  Outcome: Ongoing, Progressing

## 2022-10-20 NOTE — PT/OT/SLP PROGRESS
"Occupational Therapy   Treatment    Name: Katie Quevedo  MRN: [id number]  Admitting Diagnosis:  Acute on chronic respiratory failure  3 Days Post-Op    Recommendations:     Discharge Recommendations: TBD possibly Home Health OT-With 24/7 assistance   Discharge Equipment Recommendations:  bedside commode  Barriers to discharge:  Decreased caregiver support (Increased level of assist)    Assessment:     Katie Quevedo is a 77 y.o. female with a medical diagnosis of Acute on chronic respiratory failure.   Performance deficits affecting function are weakness, impaired endurance, impaired self care skills, impaired functional mobility, gait instability, impaired balance, decreased coordination, decreased upper extremity function, decreased lower extremity function, decreased safety awareness, decreased ROM, impaired coordination, impaired cardiopulmonary response to activity. Pt found HOB elevated, agreeable to therapy. Pt stated that she felt "SOB and wanted a breathing treatment." Nurse notified. Pt required increased time to perform all activities 2/2 SOB and fatigue. O2 sats dropped to 85% and increased to 88% with rest breaks and purse lip breathing. Pt is progressing well towards goals. [facility] Home Health OT at time of discharge to maximize [place] and safety with ADL's and functional mobility. Continue OT services to address functional goals, progressing as able.     Rehab Prognosis:  Good; patient would benefit from acute skilled OT services to address these deficits and reach maximum level of function.       Plan:     Patient to be seen 3 x/week to address the above listed problems via self-care/home management, therapeutic activities, therapeutic exercises  Plan of Care Expires:    Plan of Care Reviewed with: patient    Subjective     Pain/Comfort:   None    Objective:     Communicated with: RN prior to session.  Patient found HOB elevated with bed alarm, oxygen, peripheral IV upon OT entry to room.    General " Precautions: Standard, fall, droplet   Orthopedic Precautions:N/A   Braces: N/A   Respiratory Status: Nasal cannula, flow 3 L/min     Occupational Performance:     Bed Mobility:    Patient completed Rolling/Turning to Left with  stand by assistance  Patient completed Supine to Sit with Konrad 2/2 pt pulled up on PAMELA student as she transitioned from supine to sit EOB    Functional Mobility/Transfers:  Patient completed Sit <> Stand Transfer with contact guard assistance  with  rolling walker requiring vcs to scoot forward on EOB and use hands to push up from bed  Patient completed Bed <> Chair Transfer using Stand Pivot technique with contact guard assistance with rolling walker  Patient completed Toilet Transfer Stand Pivot technique requiring Konrad for control to sit down 2/2 low toilet using rolling walker  Functional Mobility: Pt ambulated to bathroom <>Chair with CGA using RW and required vcs to step into walker    Activities of Daily Living:  Grooming: Pt completed g/h care with Konrad to comb hair, open toothpaste cap and increased time to brush teeth 2/2 SOB and fatigue   Lower Body Dressing: Donned socks TotalA 2/2 pt reporting that she was too SOB to complete task  Toileting: Pt completed toileting requiring MaxA for perianal care       [facility] 6 Click ADL: 19    Treatment & Education:  Pt Educated pt on purse lip breathing techniques with Max vcs to perform effectively. Assessed O2 sats throughout session and pt 85-89% throughout. RN notified.    Patient left up in chair with all lines intact, call button in reach, chair alarm on, and RN notified    GOALS:   Multidisciplinary Problems       Occupational Therapy Goals          Problem: Occupational Therapy    Goal Priority Disciplines Outcome Interventions   Occupational Therapy Goal     OT, PT/OT Ongoing, Progressing    Description: Goals to be met by: 11/11/2022     Patient will increase functional independence with ADLs by performing:    LE Dressing with  Modified [place] inclusive of item retrieval.  Grooming while standing at sink with [place].  Toileting from toilet with Modified [place] for hygiene and clothing management.   Toilet transfer to toilet with Modified [place].  Upper extremity exercise program x8 reps per handout with incorporation of pain free gentle movements, with independence.  100% reciprocation of fall risk reduction recommendations to support ease of transition back to home.                         Time Tracking:     OT Date of Treatment: 10/20/22  OT Start Time: 0941  OT Stop Time: 1032  OT Total Time (min): 51 min    Billable Minutes:Self Care/Home Management 40  Therapeutic Activity 11               10/20/2022

## 2022-10-20 NOTE — PLAN OF CARE
[facility] - Telemetry  Discharge Final Note    Primary Care Provider: Kingston Verduzco MD    Expected Discharge Date: 10/20/2022    Pharmacist will go over home medications and reasons for medications. VN and bedside nurse to reiterate final discharge instructions.     Cleared from CM . Bedside Nurse and VN notified.      Final Discharge Note (most recent)       Final Note - 10/19/22 1557          Post-Acute Status    Post-Acute Authorization Placement;Home Health;Hospice     Post-Acute Placement Status Patient declined/refused     Home Health Status Discharge Plan Changed     Hospice Status Pending post acute provider review/more information requested     Discharge Delays Patient and Family Barriers   Discussed with daughter Chintan this am. Has meeting with sitters on Friday but will  mother today after work and will be with parents until meeting on Friday for sitters. Attending notified. Updated HH orders sent.                    Important Message from Medicare  Important Message from Medicare regarding Discharge Appeal Rights: Given to patient/caregiver, Explained to patient/caregiver, Signed/date by patient/caregiver     Date IMM was signed: 10/18/22  Time IMM was signed: 1146     Follow-up providers       [place] [address]   Phone: [phone]       Next Steps: Call    Instructions: As needed, TRANSPORTATION TO APPOINTMENTS AS NEEDED    [facility]    Nurse practioner to visit patient in the home as hospital follow up visit with an [facility] provider       Next Steps: Call    Instructions: As needed, [facility]              After-discharge care                Home Medical Care       [facility]   Service: Home Health Services    [address] [place] [id number]   Phone: [phone]                             Future Appointments   Date Time Provider Department Center   11/1/2022  9:00 AM  Clemencia Gambino MD [facility] IMPRI [facility]   11/2/2022  8:30 AM Maritza Calhoun MD [facility] ORTHO [place]   12/6/2022 10:20 AM Jacinto Henriquez MD [facility] CARDIO [facility]        Medication List        START taking these medications      metoprolol tartrate 25 MG tablet  Commonly known as: LOPRESSOR  Take 1 tablet (25 mg total) by mouth 2 (two) times daily.     oseltamivir 30 MG capsule  Commonly known as: TAMIFLU  Take 1 capsule (30 mg total) by mouth every Mon, Wed, Fri. for 3 days            CONTINUE taking these medications      acetaminophen 325 MG tablet  Commonly known as: TYLENOL  Take 1 tablet (325 mg total) by mouth every 6 (six) hours as needed for Pain.     albuterol 90 mcg/actuation inhaler  Commonly known as: VENTOLIN HFA  Inhale 2 puffs into the lungs every 6 (six) hours as needed for Wheezing or Shortness of Breath. Rescue     albuterol-ipratropium 2.5 mg-0.5 mg/3 mL nebulizer solution  Commonly known as: DUO-NEB  Take 3 mLs by nebulization every 6 (six) hours as needed for Shortness of Breath. Rescue     allopurinoL 100 MG tablet  Commonly known as: ZYLOPRIM  Take 1 tablet (100 mg total) by mouth on Tuesday, Thursday, Saturday, and Sunday.     amLODIPine 5 MG tablet  Commonly known as: NORVASC     atorvastatin 80 MG tablet  Commonly known as: LIPITOR     busPIRone 10 MG tablet  Commonly known as: BUSPAR     clonazePAM 1 MG tablet  Commonly known as: KlonoPIN     diclofenac sodium 1 % Gel  Commonly known as: VOLTAREN  Apply 2 g topically 3 (three) times daily as needed (Apply to painful joints).     diphenhydrAMINE 25 mg capsule  Commonly known as: BENADRYL     DULoxetine 30 MG capsule  Commonly known as: CYMBALTA  Take 1 capsule (30 mg total) by mouth once daily.     epoetin hemant-epbx 10,000 unit/mL imjection  Commonly known as: RETACRIT  Inject 0.5 mLs (5,000 Units total) into the skin every Mon, Wed, Fri.     HYDROcodone-acetaminophen  mg per tablet  Commonly known as: NORCO  Take 1 tablet  by mouth 3 (three) times daily as needed for Pain.     megestroL 40 MG Tab  Commonly known as: MEGACE     mirtazapine 15 MG tablet  Commonly known as: REMERON  Take 1 tablet (15 mg total) by mouth every evening.     ondansetron 4 mg/2 mL Soln     sevelamer carbonate 800 mg Tab  Commonly known as: RENVELA  Take 1 tablet (800 mg total) by mouth after meals as needed.     TRELEGY ELLIPTA 200-62.5-25 mcg inhaler  Generic drug: fluticasone-umeclidin-vilanter  Inhale 1 puff into the lungs once daily.            STOP taking these medications      ciprofloxacin HCl 250 MG tablet  Commonly known as: CIPRO     fentaNYL 50 mcg/mL injection  Commonly known as: SUBLIMAZE     heparin (porcine) 1,000 unit/mL injection     heparin (porcine) 10,000 unit/mL injection     levoFLOXacin 750 MG tablet  Commonly known as: LEVAQUIN     phenazopyridine 100 MG tablet  Commonly known as: PYRIDIUM     PHENYLephrine 10 mg/mL injection  Commonly known as: ANJUM-SYNEPHRINE     propofoL 10 mg/mL infusion  Commonly known as: DIPRIVAN     vancomycin 1,000 mg injection  Commonly known as: VANCOCIN               Where to Get Your Medications        These medications were sent to [facility] [address], [place] [address]      Hours: Mon-Fri, 8a-5:30p Phone: [phone]   metoprolol tartrate 25 MG tablet  oseltamivir 30 MG capsule

## 2022-10-20 NOTE — PT/OT/SLP PROGRESS
Physical Therapy Treatment    Patient Name:  Katie Quevedo   MRN:  [id number]    Recommendations:     Discharge Recommendations:   ( PT with family 24/7 supervision and assistance)   Discharge Equipment Recommendations: bedside commode   Barriers to discharge:  decreased functional mobility/decreased strength    Assessment:     Katie Quevedo is a 77 y.o. female admitted with a medical diagnosis of Acute on chronic respiratory failure.  She presents with the following impairments/functional limitations:  weakness, impaired endurance, impaired self care skills, impaired functional mobility, gait instability, impaired balance, decreased upper extremity function, decreased lower extremity function, decreased safety awareness, decreased ROM, impaired cardiopulmonary response to activity Pt would continue to benefit from P.T. To address impairments listed above.  .    Rehab Prognosis: Fair; patient would benefit from acute skilled PT services to address these deficits and reach maximum level of function.    Recent Surgery: Procedure(s) (LRB):  EGD (ESOPHAGOGASTRODUODENOSCOPY) (N/A) 3 Days Post-Op    Plan:     During this hospitalization, patient to be seen 6 x/week to address the identified rehab impairments via gait training, therapeutic activities, therapeutic exercises, neuromuscular re-education and progress toward the following goals:    Plan of Care Expires:  11/08/22    Subjective       Patient/Family Comments/goals: Pt agreed to tx  Pain/Comfort:  Pain Rating 1: 0/10  Pain Rating Post-Intervention 1: 0/10      Objective:     Communicated with RN prior to session.  Patient found up in chair with oxygen, peripheral IV (chair alarm) upon PT entry to room.     General Precautions: Standard, fall, droplet   Orthopedic Precautions:N/A   Braces:    Respiratory Status: Nasal cannula, flow 3 L/min     Functional Mobility:  Transfers:     Sit to Stand:  Konrad  with and without A.D.  VC's for hand placement when standing with RW  and when standing without PTA in front and pt holding onto PTA's arms  Chair to [facility] commode: minimum assistance with  no AD  using  Step Transfer  Gait: 3 small turning steps without A.D with Konrad holding onto PTA's arms.  Transfer b/scommode to b/s chair with RW and Konrad for RW management and stability with vc's for sequencing.  Pt stepping with increased trunk flexion and decreased steps  Balance: sitting good-, standing fair RW, gait fair- RW      AM-PAC 6 CLICK MOBILITY  Turning over in bed (including adjusting bedclothes, sheets and blankets)?: 3  Sitting down on and standing up from a chair with arms (e.g., wheelchair, bedside commode, etc.): 3  Moving from lying on back to sitting on the side of the bed?: 3  Need to walk in hospital room?: 3  Climbing 3-5 steps with a railing?: 1       Treatment & Education:  Pt was sitting up in [facility] recliner on wall unit O2 @ 3lpm upon entering the room.  Pt was breathing heavy and stated she was SOB.  PTA took O2 sats - 92-94%.  Pt was instructed in PLB techniques to assist with calming respirations.  Pt was upset secondary to her  on his way to the ED.  Pt reports her  has slurred speech.  RT entered the room and placed breathing tx on pt.  Pt then transferred to [facility] commode with Konrad as above.  Static standing with RW and CGA while PTA assisted pt with hygiene needs secondary to BM/urine.  Pt then transferred back to [facility] chair.  Breathing tx removed with pt stating she felt better and was breathing calmer with slower respiration.  Pt performed BLE therex in b/s chair: AP, LAQs and hip flexion 10 x 2 reps with vc's/tc's for proper technique and brief rest breaks as needed.      Patient left up in chair with all lines intact, call button in reach, chair alarm on, and RN notified..    GOALS:   Multidisciplinary Problems       Physical Therapy Goals          Problem: Physical Therapy    Goal Priority Disciplines Outcome Goal Variances Interventions   Physical  Therapy Goal     PT, PT/OT Ongoing, Progressing     Description: Goals to be met by: 22    Patient will increase functional independence with mobility by performin.  Supine to/from sit with Mod Ind  2.  Bed to/from chair with Mod Ind with or without RW  3.  Ambulate 125' with RW with supervision  4.  Up in chair 2 hours                         Time Tracking:     PT Received On: 10/20/22  PT Start Time: 1320     PT Stop Time: 1345  PT Total Time (min): 25 min     Billable Minutes: Therapeutic Activity 25    Treatment Type: Treatment  PT/PTA: PTA     PTA Visit Number: 3     10/20/2022

## 2022-10-20 NOTE — DISCHARGE SUMMARY
[facility] Medicine  Discharge Summary      Patient Name: Katie Quevedo  MRN: [id number]  Patient Class: IP- Inpatient  Admission Date: 10/11/2022  Hospital Length of Stay: 9 days  Discharge Date and Time: 10/20/2022  6:55 PM  Attending Physician: Jennifer Bowles*   Discharging Provider: Jennifer Bowles MD  Primary Care Provider: Kingston Verduzco MD      HPI:   Katie is a 77 y.o. female with past medical history of CHF, COPD, ESRD, hypertension, migraines, PE who presents with complaint of increased shortness of breath, increased cough compared to usual and fever for the last 2 days.  She states that her  recently was diagnosed with the flu.  She is normally on 3 L of oxygen via nasal cannula at home.  Her pulse ox was reading in the 80s today.  She did breathing treatments this morning which she thought maybe helped a little bit.  In the emergency department she did test positive for influenza.  She is also febrile with a T-max of 101°.  She is admitted for acute on chronic respiratory failure in the setting of influenza infection.      Procedure(s) (LRB):  EGD (ESOPHAGOGASTRODUODENOSCOPY) (N/A)      Hospital Course:   No notes on file     Goals of Care Treatment Preferences:  Code Status: Full Code          What is most important right now is to focus on spending time at home, avoiding the hospital, remaining as independent as possible, improvement in condition but with limits to invasive therapies.  Accordingly, we have decided that the best plan to meet the patient's goals includes continuing with treatment.      Consults:   Consults (From admission, onward)          Status Ordering Provider     Inpatient consult to Social Work  Once        Provider:  (Not yet assigned)    Acknowledged JENNIFER BOWLES     Inpatient consult to Palliative Care  Once        Provider:  (Not yet assigned)    Completed SEGUNDO GILBERT     Inpatient consult to Gastroenterology-[facility]  Once         Provider:  (Not yet assigned)    Completed SEGUNDO GILBERT     [facility]  Once        Provider:  (Not yet assigned)    Acknowledged MOSHEN AGUIRRE     IP consult to case management  Once        Provider:  (Not yet assigned)    Acknowledged MOHSEN AGUIRRE            * Acute on chronic respiratory failure  Likely secondary to influenza infection.  Vaccinated for influenza about 4 months ago.  Tamiflu X 5 days    Will complete 5 days of antibiotics (cefepime/vancomycin X 3 days followed by ceftriaxone X 2 days) for suspected secondary bacterial infection given hypotension.  Blood cultures negative.    Continue scheduled inhalers    Reported to be on 2 L oxygen at baseline for COPD, confirmed with patient's .  Wean oxygen as tolerated.      Advance care planning  Advance Care Planning     Date: 10/18/2022    Code Status  In light of the patients advanced and life limiting illness,I engaged the the patient in a conversation about the patient's preferences for care  at the very end of life. The patient wishes to have a natural, peaceful death.  Along those lines, the patient does wish to have CPR and other invasive treatments performed when her heart and/or breathing stops. I communicated to the patient that a FULL code order would be placed in her medical record to reflect this preference.  I spent a total of 10 minutes engaging the patient in this advance care planning discussion.       Advance Care Planning     Date: 10/20/2022    [facility]  I engaged the patient and family in a conversation about advance care planning and we specifically addressed what the goals of care would be moving forward, in light of the patient's change in clinical status, specifically progressive worsening respiratory status.  We did specifically address the patient's likely prognosis, which is fair .  We explored the patient's values and preferences for future care.  The patient and  family endorses that what is most important right now is to focus on spending time at home, avoiding the hospital, remaining as independent as possible and improvement in condition but with limits to invasive therapies    Accordingly, we have decided that the best plan to meet the patient's goals includes continuing with treatment    I did explain the role for hospice care at this stage of the patient's illness, including its ability to help the patient live with the best quality of life possible.  We will be making a hospice referral.    I spent a total of 20 minutes engaging the patient in this advance care planning discussion.               Hypotension  Hypotension with systolics in the 60s noted on 10/12  Transiently responded to 1 L IV fluid bolus and was placed on Levophed for a few hours, now off pressors     Blood cultures show no growth till date.  Suspect infectious/sepsis picture. completed 5 days of antibiotics for presumed secondary bacterial infection.  Echocardiogram showed preserved LVEF.      Chronic respiratory failure  On 3 L oxygen at baseline for COPD      Influenza A virus present  will treat influenza with a five-day course of Tamiflu.          ESRD (end stage renal disease) on dialysis  Follows with Dr. Diggs. Nephrology consulted      Nausea  Persistent refractory nausea.  Reported episode of coffee-ground emesis, dark stools on 10/16.  Stool occult blood positive.  Chemical DVT prophylaxis on hold.  Hemoglobin stable.  Continue IV PPI.  Zofran/Phenergan ineffective.  Compazine p.r.n. for nausea control.  CT abdomen pelvis with IV contrast, showed fluid full stomach.  Was unable tolerate p.o. contrast.    GI consult.  Patient was scheduled to get a EGD due to respiratory issues.  Clear liquid diet and tolerating .        Final Active Diagnoses:    Diagnosis Date Noted POA    PRINCIPAL PROBLEM:  Acute on chronic respiratory failure [J96.20] 10/11/2022 Yes    Advance care planning [Z71.89]  "10/18/2022 Not Applicable    Hematemesis of unknown cause [K92.0] 10/17/2022 Yes    severe stage 4 COPD [J44.1] 10/13/2022 Yes    Chronic respiratory failure [J96.10] 10/13/2022 Yes    Hypotension [I95.9] 10/13/2022 No    Influenza A virus present [J10.1] 10/11/2022 Yes    Palliative care encounter [Z51.5] 02/26/2020 Not Applicable    ESRD (end stage renal disease) on dialysis [N18.6, Z99.2] 02/19/2020 Not Applicable     Chronic    Nausea [R11.0] 01/14/2020 No    Pulmonary cachexia due to COPD [J44.9, R64] 12/18/2019 Yes      Problems Resolved During this Admission:    Diagnosis Date Noted Date Resolved POA    Chronic respiratory failure with hypoxia, on home O2 therapy [J96.11, Z99.81] 07/18/2019 10/11/2022 Not Applicable     Chronic       Discharged Condition: stable    Disposition: Hospice/Home    Follow Up:   Contact information for follow-up providers       [facility]. Call.    Why: As needed, TRANSPORTATION TO APPOINTMENTS AS NEEDED  Contact information:  [address] [facility] [address]  [phone]             [facility]. Call.    Why: As needed, [facility]  Contact information:  Nurse practioner to visit patient in the home as hospital follow up visit with an [facility] provider                     Contact information for after-discharge care       Home Medical Care       [place]  [facility] .    Service: Home Health Services  Contact information:  [address] [facility] [address] [phone]                                 Patient Instructions:      COMMODE FOR HOME USE     Order Specific Question Answer Comments   Type: Standard    Height: 5' 2" (1.575 m)    Weight: 40.8 kg (90 lb)    Does patient have medical equipment at home? rollator    Does patient have medical equipment at home? oxygen    Does patient have medical equipment at home? grab bar    Length of need (1-99 months): 99      Ambulatory " referral/consult to Outpatient Case Management   Referral Priority: Routine Referral Type: Consultation   Referral Reason: Specialty Services Required   Number of Visits Requested: 1     Ambulatory referral/consult to [facility]   Standing Status: Future   Referral Priority: Urgent Referral Type: Consultation   Referral Reason: Specialty Services Required   Number of Visits Requested: 1     Diet Cardiac       Pending Diagnostic Studies:       None           Medications:  Reconciled Home Medications:      Medication List        START taking these medications      metoprolol tartrate 25 MG tablet  Commonly known as: LOPRESSOR  Take 1 tablet (25 mg total) by mouth 2 (two) times daily.     oseltamivir 30 MG capsule  Commonly known as: TAMIFLU  Take 1 capsule (30 mg total) by mouth every Mon, Wed, Fri. for 3 days            CONTINUE taking these medications      acetaminophen 325 MG tablet  Commonly known as: TYLENOL  Take 1 tablet (325 mg total) by mouth every 6 (six) hours as needed for Pain.     albuterol 90 mcg/actuation inhaler  Commonly known as: VENTOLIN HFA  Inhale 2 puffs into the lungs every 6 (six) hours as needed for Wheezing or Shortness of Breath. Rescue     albuterol-ipratropium 2.5 mg-0.5 mg/3 mL nebulizer solution  Commonly known as: DUO-NEB  Take 3 mLs by nebulization every 6 (six) hours as needed for Shortness of Breath. Rescue     allopurinoL 100 MG tablet  Commonly known as: ZYLOPRIM  Take 1 tablet (100 mg total) by mouth on Tuesday, Thursday, Saturday, and Sunday.     amLODIPine 5 MG tablet  Commonly known as: NORVASC  Take 5 mg by mouth once daily.     atorvastatin 80 MG tablet  Commonly known as: LIPITOR  Take 80 mg by mouth once daily.     busPIRone 10 MG tablet  Commonly known as: BUSPAR  Take 10 mg by mouth once daily.     clonazePAM 1 MG tablet  Commonly known as: KlonoPIN  Take 1 mg by mouth 2 (two) times daily as needed for Anxiety.     diclofenac sodium 1 % Gel  Commonly  known as: VOLTAREN  Apply 2 g topically 3 (three) times daily as needed (Apply to painful joints).     diphenhydrAMINE 25 mg capsule  Commonly known as: BENADRYL  Take 25 mg by mouth every 6 (six) hours as needed for Itching.     DULoxetine 30 MG capsule  Commonly known as: CYMBALTA  Take 1 capsule (30 mg total) by mouth once daily.     epoetin hemant-epbx 10,000 unit/mL imjection  Commonly known as: RETACRIT  Inject 0.5 mLs (5,000 Units total) into the skin every Mon, Wed, Fri.     HYDROcodone-acetaminophen  mg per tablet  Commonly known as: NORCO  Take 1 tablet by mouth 3 (three) times daily as needed for Pain.     megestroL 40 MG Tab  Commonly known as: MEGACE  Take 40 mg by mouth once daily.     mirtazapine 15 MG tablet  Commonly known as: REMERON  Take 1 tablet (15 mg total) by mouth every evening.     ondansetron 4 mg/2 mL Soln     sevelamer carbonate 800 mg Tab  Commonly known as: RENVELA  Take 1 tablet (800 mg total) by mouth after meals as needed.     TRELEGY ELLIPTA 200-62.5-25 mcg inhaler  Generic drug: fluticasone-umeclidin-vilanter  Inhale 1 puff into the lungs once daily.            STOP taking these medications      ciprofloxacin HCl 250 MG tablet  Commonly known as: CIPRO     fentaNYL 50 mcg/mL injection  Commonly known as: SUBLIMAZE     heparin (porcine) 1,000 unit/mL injection     heparin (porcine) 10,000 unit/mL injection     levoFLOXacin 750 MG tablet  Commonly known as: LEVAQUIN     phenazopyridine 100 MG tablet  Commonly known as: PYRIDIUM     PHENYLephrine 10 mg/mL injection  Commonly known as: ANJUM-SYNEPHRINE     propofoL 10 mg/mL infusion  Commonly known as: DIPRIVAN     vancomycin 1,000 mg injection  Commonly known as: VANCOCIN              Indwelling Lines/Drains at time of discharge:   Lines/Drains/Airways       Central Venous Catheter Line  Duration                  Hemodialysis AV Graft Left upper arm -- days    Permacath right subclavian -- days              Drain  Duration              Female External Urinary Catheter 10/14/22 2005 5 days                    Time spent on the discharge of patient: 35 minutes         Jennifer Bowles MD  Department of [facility] Medicine  [place] [facility]

## 2022-10-20 NOTE — PLAN OF CARE
10/20/22 1624   Post-Acute Status   Post-Acute Authorization Other   Discharge Delays (!) Patient and Family Barriers  (Pt does not have  for portable oxygen for transport. Has home oxygen with [facility]. Family will need to do a tank swap. Communicated to granddaughter who reports she had to leave but will return with empty tank and/or  for portable. Nurse aware)

## 2022-10-20 NOTE — PLAN OF CARE
"  Problem: Occupational Therapy  Goal: Occupational Therapy Goal  Description: Goals to be met by: 11/11/2022     Patient will increase functional independence with ADLs by performing:    LE Dressing with Modified [place] inclusive of item retrieval.  Grooming while standing at sink with [place].  Toileting from toilet with Modified [place] for hygiene and clothing management.   Toilet transfer to toilet with Modified [place].  Upper extremity exercise program x8 reps per handout with incorporation of pain free gentle movements, with independence.  100% reciprocation of fall risk reduction recommendations to support ease of transition back to home.    Outcome: Ongoing, Progressing   Katie Quevedo is a 77 y.o. female with a medical diagnosis of Acute on chronic respiratory failure.   Performance deficits affecting function are weakness, impaired endurance, impaired self care skills, impaired functional mobility, gait instability, impaired balance, decreased coordination, decreased upper extremity function, decreased lower extremity function, decreased safety awareness, decreased ROM, impaired coordination, impaired cardiopulmonary response to activity. Pt found HOB elevated, agreeable to therapy. Pt stated that she felt "SOB and wanted a breathing treatment." Nurse notified. Pt required increased time to perform all activities 2/2 SOB and fatigue. O2 sats dropped to 85% and increased to 88% with rest breaks and purse lip breathing. Pt is progressing well towards goals. [facility] OT at time of discharge to maximize [place] and safety with ADL's and functional mobility. Continue OT services to address functional goals, progressing as able.     "

## 2022-10-21 ENCOUNTER — PES CALL (OUTPATIENT)
Dept: ADMINISTRATIVE | Facility: CLINIC | Age: 77
End: 2022-10-21
Payer: MEDICARE

## 2022-10-21 ENCOUNTER — HOSPITAL ENCOUNTER (EMERGENCY)
Facility: HOSPITAL | Age: 77
Discharge: HOME | End: 2022-10-21
Attending: EMERGENCY MEDICINE
Payer: MEDICARE

## 2022-10-21 VITALS
SYSTOLIC BLOOD PRESSURE: 129 MMHG | TEMPERATURE: 98 F | HEART RATE: 100 BPM | RESPIRATION RATE: 20 BRPM | DIASTOLIC BLOOD PRESSURE: 64 MMHG | OXYGEN SATURATION: 96 %

## 2022-10-21 DIAGNOSIS — R06.02 SHORTNESS OF BREATH: Primary | ICD-10-CM

## 2022-10-21 LAB
ALBUMIN SERPL BCP-MCNC: 3 G/DL (ref 3.5–5.2)
ALP SERPL-CCNC: 236 U/L (ref 55–135)
ALT SERPL W/O P-5'-P-CCNC: 29 U/L (ref 10–44)
ANION GAP SERPL CALC-SCNC: 12 MMOL/L (ref 8–16)
AST SERPL-CCNC: 41 U/L (ref 10–40)
BASOPHILS # BLD AUTO: 0.06 K/UL (ref 0–0.2)
BASOPHILS NFR BLD: 0.5 % (ref 0–1.9)
BILIRUB SERPL-MCNC: 0.5 MG/DL (ref 0.1–1)
BUN SERPL-MCNC: 16 MG/DL (ref 8–23)
CALCIUM SERPL-MCNC: 8.6 MG/DL (ref 8.7–10.5)
CHLORIDE SERPL-SCNC: 102 MMOL/L (ref 95–110)
CO2 SERPL-SCNC: 23 MMOL/L (ref 23–29)
CREAT SERPL-MCNC: 2.6 MG/DL (ref 0.5–1.4)
CTP QC/QA: YES
DIFFERENTIAL METHOD: ABNORMAL
EOSINOPHIL # BLD AUTO: 0.5 K/UL (ref 0–0.5)
EOSINOPHIL NFR BLD: 4.8 % (ref 0–8)
ERYTHROCYTE [DISTWIDTH] IN BLOOD BY AUTOMATED COUNT: 13.6 % (ref 11.5–14.5)
EST. GFR  (NO RACE VARIABLE): 18 ML/MIN/1.73 M^2
GLUCOSE SERPL-MCNC: 84 MG/DL (ref 70–110)
HCT VFR BLD AUTO: 38.3 % (ref 37–48.5)
HGB BLD-MCNC: 11.9 G/DL (ref 12–16)
IMM GRANULOCYTES # BLD AUTO: 0.1 K/UL (ref 0–0.04)
IMM GRANULOCYTES NFR BLD AUTO: 0.9 % (ref 0–0.5)
LYMPHOCYTES # BLD AUTO: 1.3 K/UL (ref 1–4.8)
LYMPHOCYTES NFR BLD: 11.1 % (ref 18–48)
MAGNESIUM SERPL-MCNC: 1.7 MG/DL (ref 1.6–2.6)
MCH RBC QN AUTO: 30.4 PG (ref 27–31)
MCHC RBC AUTO-ENTMCNC: 31.1 G/DL (ref 32–36)
MCV RBC AUTO: 98 FL (ref 82–98)
MONOCYTES # BLD AUTO: 1.5 K/UL (ref 0.3–1)
MONOCYTES NFR BLD: 12.8 % (ref 4–15)
NEUTROPHILS # BLD AUTO: 7.9 K/UL (ref 1.8–7.7)
NEUTROPHILS NFR BLD: 69.9 % (ref 38–73)
NRBC BLD-RTO: 0 /100 WBC
PLATELET # BLD AUTO: 308 K/UL (ref 150–450)
PMV BLD AUTO: 10.5 FL (ref 9.2–12.9)
POTASSIUM SERPL-SCNC: 3.4 MMOL/L (ref 3.5–5.1)
PROT SERPL-MCNC: 8.1 G/DL (ref 6–8.4)
RBC # BLD AUTO: 3.92 M/UL (ref 4–5.4)
SARS-COV-2 RDRP RESP QL NAA+PROBE: NEGATIVE
SODIUM SERPL-SCNC: 137 MMOL/L (ref 136–145)
TROPONIN I SERPL DL<=0.01 NG/ML-MCNC: 0.06 NG/ML (ref 0–0.03)
TROPONIN I SERPL DL<=0.01 NG/ML-MCNC: 0.07 NG/ML (ref 0–0.03)
WBC # BLD AUTO: 11.31 K/UL (ref 3.9–12.7)

## 2022-10-21 PROCEDURE — 85025 COMPLETE CBC W/AUTO DIFF WBC: CPT | Performed by: EMERGENCY MEDICINE

## 2022-10-21 PROCEDURE — G0180 MD CERTIFICATION HHA PATIENT: HCPCS | Mod: ,,, | Performed by: FAMILY MEDICINE

## 2022-10-21 PROCEDURE — 93005 ELECTROCARDIOGRAM TRACING: CPT

## 2022-10-21 PROCEDURE — 84484 ASSAY OF TROPONIN QUANT: CPT | Mod: 91 | Performed by: EMERGENCY MEDICINE

## 2022-10-21 PROCEDURE — 87635 SARS-COV-2 COVID-19 AMP PRB: CPT | Performed by: EMERGENCY MEDICINE

## 2022-10-21 PROCEDURE — G0180 PR HOME HEALTH MD CERTIFICATION: ICD-10-PCS | Mod: ,,, | Performed by: FAMILY MEDICINE

## 2022-10-21 PROCEDURE — 93010 EKG 12-LEAD: ICD-10-PCS | Mod: ,,, | Performed by: INTERNAL MEDICINE

## 2022-10-21 PROCEDURE — 93010 ELECTROCARDIOGRAM REPORT: CPT | Mod: ,,, | Performed by: INTERNAL MEDICINE

## 2022-10-21 PROCEDURE — 80053 COMPREHEN METABOLIC PANEL: CPT | Performed by: EMERGENCY MEDICINE

## 2022-10-21 PROCEDURE — 99285 EMERGENCY DEPT VISIT HI MDM: CPT | Mod: 25

## 2022-10-21 PROCEDURE — 83735 ASSAY OF MAGNESIUM: CPT | Performed by: EMERGENCY MEDICINE

## 2022-10-21 RX ORDER — HYDROCODONE BITARTRATE AND ACETAMINOPHEN 10; 325 MG/1; MG/1
1 TABLET ORAL 3 TIMES DAILY PRN
Qty: 90 TABLET | Refills: 0 | Status: CANCELLED | OUTPATIENT
Start: 2022-10-21 | End: 2022-11-20

## 2022-10-21 NOTE — ED NOTES
Pt sleeping at this time. NAD noted. Cardiac monitor in place. Vs stable. Will continue to monitor.   Patient is not pregnant (male or female)

## 2022-10-21 NOTE — ED PROVIDER NOTES
Encounter Date: 10/21/2022       History     Chief Complaint   Patient presents with    Shortness of Breath     Pt was at dialysis when she became short of breath. Dialysis was not started. Pt arrived awake and alert.      77-year-old female brought to emergency department from dialysis for evaluation of shortness of breath.  Patient recently discharged after having influenza on top of her COPD and ESRD on HD with acute on chronic respiratory failure.  Was discharged in stable condition.  States she was feeling well and she went to dialysis.  States she gets very nervous and dialysis and hyperventilates and then feel short of breath and was subsequently sent to the emergency department.  On arrival here, she states all of her symptoms have resolved.  Denies any pain.  Notes a chronic, nonproductive cough that is unchanged from her baseline.  Denies any fever, sore throat, nausea, vomiting.  States she always gets very nervous when she does dialysis.       Review of patient's allergies indicates:   Allergen Reactions    Aspirin      Other reaction(s): hx of ulcers    Tetracycline Swelling     Other reaction(s): Swelling    Penicillins Rash     Other reaction(s): Hives  Other reaction(s): Rash  Other reaction(s): Rash  Other reaction(s): Hives     Past Medical History:   Diagnosis Date    Acute congestive heart failure 02/10/2020    Anemia     Bilateral renal cysts     Cataract     Chronic LBP 7/26/2012    Chronic pain     CKD (chronic kidney disease), stage IV     Colon polyp 2013    COPD (chronic obstructive pulmonary disease)     Dehydration     Encounter for blood transfusion     HTN (hypertension)     Lumbar spondylosis     Melanoma     of the lip    Metabolic bone disease     Migraines, neuralgic     Osteoporosis     Primary osteoarthritis of both knees     s/p Rt TKA    Pulmonary embolism with infarction     Seizures 1972    x1 only    Subdeltoid bursitis, L>R. 9/27/2012    Ulcer     Vitamin D deficiency  disease      Past Surgical History:   Procedure Laterality Date    BLADDER SUSPENSION      CATARACT EXTRACTION  11/18/13    left eye    CERVICAL LAMINECTOMY      x3, fusion x1    COLONOSCOPY  2009    DECLOTTING OF ARTERIOVENOUS GRAFT Left 1/3/2022    Procedure: [id number];  Surgeon: Lindsey Louie MD;  Location: [facility] OR;  Service: Vascular;  Laterality: Left;    DECLOTTING OF ARTERIOVENOUS GRAFT Left 2/8/2022    Procedure: [id number];  Surgeon: Lindsey Louie MD;  Location: [facility] OR;  Service: Vascular;  Laterality: Left;    DECLOTTING OF ARTERIOVENOUS GRAFT Left 4/8/2022    Procedure: [id number];  Surgeon: Lindsey Louie MD;  Location: [facility] OR;  Service: Vascular;  Laterality: Left;    FISTULOGRAM N/A 2/27/2020    Procedure: Fistulogram;  Surgeon: Noe Benitez MD;  Location: [facility] CATH LAB/EP;  Service: Cardiology;  Laterality: N/A;    HYSTERECTOMY      JOINT REPLACEMENT  2001    total right knee     LUMBAR LAMINECTOMY      x 3, fusion x1    OOPHORECTOMY      PERIPHERAL ANGIOGRAPHY N/A 3/9/2020    Procedure: Peripheral angiography;  Surgeon: Jacinto Henriquez MD;  Location: [facility] CATH LAB/EP;  Service: Cardiology;  Laterality: N/A;    PLACEMENT OF ARTERIOVENOUS GRAFT Left 1/21/2020    Procedure: INSERTION, GRAFT, ARTERIOVENOUS;  Surgeon: Lindsey Louie MD;  Location: [facility] OR;  Service: General;  Laterality: Left;    PLACEMENT OF ARTERIOVENOUS GRAFT Right 7/22/2022    Procedure: INSERTION, GRAFT, ARTERIOVENOUS;  Surgeon: Lindsey Louie MD;  Location: [facility] OR;  Service: General;  Laterality: Right;    PLACEMENT OF DUAL-LUMEN VASCULAR CATHETER Right 4/16/2022    Procedure: INSERTION-CATHETER-RICKI;  Surgeon: Lindsey Louie MD;  Location: [facility] OR;  Service: General;  Laterality: Right;    THROMBECTOMY Left 2/3/2020    Procedure: THROMBECTOMY;  Surgeon: Lindsey Louie MD;  Location: [facility] OR;  Service: General;  Laterality: Left;    THROMBECTOMY   3/9/2020    Procedure: Thrombectomy;  Surgeon: Jacinto Henriquez MD;  Location: [facility] CATH LAB/EP;  Service: Cardiology;;    THROMBECTOMY Left 4/7/2022    Procedure: THROMBECTOMY;  Surgeon: Lindsey Louie MD;  Location: [facility] OR;  Service: General;  Laterality: Left;     Family History   Problem Relation Age of Onset    Arthritis Mother     Stroke Mother     Hypertension Father     Cancer Father     Cataracts Father     Diabetes Maternal Aunt     Hypertension Maternal Grandfather     Heart disease Maternal Grandfather     Heart attack Maternal Grandfather     Cataracts Sister     Glaucoma Cousin      Social History     Tobacco Use    Smoking status: Former     Types: Cigarettes    Smokeless tobacco: Former     Quit date: 2/3/2015   Substance Use Topics    Alcohol use: Not Currently     Comment: Rare    Drug use: No     Review of Systems   Constitutional:  Negative for chills, fatigue and fever.   HENT:  Negative for congestion and sore throat.    Eyes:  Negative for photophobia and visual disturbance.   Respiratory:  Positive for cough and shortness of breath.    Cardiovascular:  Negative for chest pain and palpitations.   Gastrointestinal:  Negative for abdominal pain, diarrhea and vomiting.   Musculoskeletal:  Negative for back pain, neck pain and neck stiffness.   Neurological:  Negative for light-headedness, numbness and headaches.     Physical Exam     Initial Vitals [10/21/22 1455]   BP Pulse Resp Temp SpO2   (!) 141/77 (!) 112 (!) 22 98 °F (36.7 °C) 95 %      MAP       --         Physical Exam    Nursing note and vitals reviewed.  Constitutional: She appears well-developed and well-nourished. No distress.   HENT:   Head: Normocephalic and atraumatic.   Eyes: Conjunctivae and EOM are normal. Pupils are equal, round, and reactive to light.   Neck: Neck supple. No tracheal deviation present.   Normal range of motion.  Cardiovascular:  Normal rate and intact distal pulses.           Pulmonary/Chest: No  respiratory distress.   Musculoskeletal:         General: No tenderness or edema. Normal range of motion.      Cervical back: Normal range of motion and neck supple.     Neurological: She is alert and oriented to person, place, and time. She has normal strength. No cranial nerve deficit. GCS score is 15. GCS eye subscore is 4. GCS verbal subscore is 5. GCS motor subscore is 6.   Skin: Skin is warm and dry.       ED Course   Procedures  Labs Reviewed   CBC W/ AUTO DIFFERENTIAL - Abnormal; Notable for the following components:       Result Value    RBC 3.92 (*)     Hemoglobin 11.9 (*)     MCHC 31.1 (*)     Immature Granulocytes 0.9 (*)     Gran # (ANC) 7.9 (*)     Immature Grans (Abs) 0.10 (*)     Mono # 1.5 (*)     Lymph % 11.1 (*)     All other components within normal limits   COMPREHENSIVE METABOLIC PANEL - Abnormal; Notable for the following components:    Potassium 3.4 (*)     Creatinine 2.6 (*)     Calcium 8.6 (*)     Albumin 3.0 (*)     Alkaline Phosphatase 236 (*)     AST 41 (*)     eGFR 18 (*)     All other components within normal limits   TROPONIN I - Abnormal; Notable for the following components:    Troponin I 0.073 (*)     All other components within normal limits   TROPONIN I - Abnormal; Notable for the following components:    Troponin I 0.061 (*)     All other components within normal limits   MAGNESIUM   SARS-COV-2 RDRP GENE     EKG Readings: (Independently Interpreted)   Initial Reading: No STEMI. Previous EKG: Compared with most recent EKG Previous EKG Date: 10/11/2022 (Nonspecific change). Rhythm: Sinus Tachycardia. Heart Rate: 108. Ectopy: No Ectopy. Conduction: LPFB. ST Segments: Normal ST Segments. Axis: Normal.   ECG Results              EKG 12-lead (Final result)  Result time 10/21/22 16:47:34      Final result by Interface, Lab In [facility] (10/21/22 16:47:34)                   Narrative:    Test Reason : R06.02,    Vent. Rate : 108 BPM     Atrial Rate : 109 BPM     P-R Int : 182 ms           QRS Dur : 072 ms      QT Int : 356 ms       P-R-T Axes : 073 115 060 degrees     QTc Int : 477 ms    Sinus tachycardia  Left posterior fascicular block  Possible Anterior infarct (cited on or before 11-OCT-2022)  Abnormal ECG  When compared with ECG of 11-OCT-2022 12:45,  T wave inversion no longer evident in Inferior leads  Nonspecific T wave abnormality, improved in Anterior leads  Confirmed by Lisandra Art MD ([id number]) on 10/21/2022 4:47:22 PM    Referred By: System System           Confirmed By:Lisandra Art MD                                         Medications - No data to display  Medical Decision Making:   Initial Assessment:   77-year-old female presents to the emergency department complaining of shortness of breath that resolved prior to arrival  Independently Interpreted Test(s):   I have ordered and independently interpreted EKG Reading(s) - see prior notes  ED Management:  Patient's troponin somewhat elevated.  I have ordered a repeat troponin for 545. Patient will be handed off to Dr. Bob for f/u of pending repeat trop and disposition.            ED Course as of 10/21/22 1849   Fri Oct 21, 2022   1708 Assumed care of patient from prior physician.  Patient history of ESRD on HD who presents from dialysis center given she with anxious prior to initiation of dialysis.  Chest x-ray and EKG unremarkable.  Patient her on 3 L nasal cannula which is baseline.  Troponin of 0.073.  Patient without chest pain. Patient currently awaiting a CBC and delta troponin.  If negative patient to be discharged with close PCP follow-up [AS]   1716 CBC without significant leukocytosis, anemia, or platelet abnormalities.     [AS]   1840 Repeat troponin downtrending.  Patient at her baseline oxygen requirement.  Patient stable for discharge.  No indication for emergent dialysis at this time [AS]      ED Course User Index  [AS] Jason Bob MD          This dictation has been generated using M-Modal Fluency Direct dictation;  some phonetic errors may occur.          Clinical Impression:   Final diagnoses:  [R06.02] Shortness of breath (Primary)        ED Disposition Condition    Discharge Stable          ED Prescriptions    None       Follow-up Information       Follow up With Specialties Details Why Contact Info    Kingston Verduzco MD Family Medicine Call  For reassessment and need to be seen in clinic [address]  [address]  [place] [id number]  [phone]      Dialysis  Go on 10/24/2022 For routine dialysis              Jason Bob MD  10/21/22 8554

## 2022-10-21 NOTE — ED NOTES
Portable xr at bedside   Progress Note    Patient: Kena Amaya MRN: [id number]  SSN: [id number]    YOB: 1954  Age: 57 y.o. Sex: male        Chief Complaint   Patient presents with    Cough         Subjective:     Encounter Diagnoses   Name Primary?  HTN (hypertension), benign: Controlled on higher dose of metoprolol. Yes    Acute non-recurrent maxillary sinusitis:  Duration of symptoms: 4 days   Nasal discharge color: Yellow   Ear ache, face pain, headache or facial swelling: None   Fever: None   Chills: None   Sweats: No   Associated Cough:yes   Wheezing:yes   Smoker:no         Acute bronchitis, unspecified organism:   Duration of symptoms:4 days   Shortness of breath:no   Wheezing:yes, he has use one half dose of albuterol due to elevated blood pressure and jitteriness. History of asthma: He wheezes only with infections. Sputum: He has yellow sputum         Wheezing: See above he will use albuterol until he stops wheezing -one half ampule         Current and past medical information:    Current Medications after this visit[de-identified]   Current Outpatient Prescriptions   Medication Sig    cefPROZIL (CEFZIL) 500 mg tablet Take 1 Tab by mouth two (2) times a day for 10 days.  triamcinolone acetonide (KENALOG) 0.1 % topical cream APPLY THIN LAYER TO AFFECTED AREA 2 TIMES A DAY    TRIAMCINOLONE ACETONIDE, BULK, by Does Not Apply route.  pravastatin (PRAVACHOL) 20 mg tablet Take 1 Tab by mouth nightly. Indications: hypercholesterolemia    metoprolol succinate (TOPROL-XL) 50 mg XL tablet Take 1 Tab by mouth daily. Indications: hypertension    oxyCODONE-acetaminophen (PERCOCET) 5-325 mg per tablet Take 1 Tab by mouth every four (4) hours as needed for Pain. Max Daily Amount: 6 Tabs.  naproxen sodium (ALEVE) 220 mg tablet Take 220 mg by mouth two (2) times daily (with meals).  loratadine (CLARITIN) 10 mg tablet Take 10 mg by mouth daily.     guaiFENesin (MUCINEX) 1,200 mg Ta12 ER tablet Take 1,200 mg by mouth two (2) times a day. Indications: COUGH    Omeprazole delayed release (PRILOSEC D/R) 20 mg tablet Take 20 mg by mouth daily.  albuterol (PROVENTIL VENTOLIN) 2.5 mg /3 mL (0.083 %) nebulizer solution 3 mL by Nebulization route every six (6) hours. As needed for wheezing.  WELCHOL 625 mg tablet TAKE 3 TABLETS BY MOUTH TWICE A DAY WITH FOOD    meloxicam (MOBIC) 15 mg tablet TAKE ONE TABLET BY MOUTH DAILY WITH MEALS  Indications: OSTEOARTHRITIS    albuterol (PROVENTIL HFA) 90 mcg/actuation inhaler Take 2 Puffs by inhalation every six (6) hours as needed for Wheezing.  ramipril (ALTACE) 10 mg capsule TAKE ONE CAPSULE BY MOUTH TWICE A DAY    fenofibrate (LOFIBRA) 160 mg tablet TAKE 1 TABLET BY MOUTH DAILY    cyanocobalamin (VITAMIN B-12) 1,000 mcg tablet Take 1 Tab by mouth daily.  acyclovir (ZOVIRAX) 5 % ointment Apply  to affected area every four (4) hours (while awake). For future infections.  fluticasone (FLONASE) 50 mcg/actuation nasal spray 2 Sprays by Both Nostrils route daily.  Glucosamine HCl 1,500 mg Tab Take 1,500 mg by mouth two (2) times a day.  Cholecalciferol, Vitamin D3, (VITAMIN D) 2,000 unit Cap Take  by mouth.  psyllium seed-sucrose (METAMUCIL) Powd Take 1 Dose by mouth two (2) times a day. No current facility-administered medications for this visit.         Patient Active Problem List    Diagnosis Date Noted    Positive PPD 07/01/2010     Priority: 1 - One    RACHEL (obstructive sleep apnea) 07/01/2010     Priority: 1 - One    HTN (hypertension), benign 04/01/2010     Priority: 1 - One    High cholesterol 04/01/2010     Priority: 1 - One    GERD (gastroesophageal reflux disease) 04/01/2010     Priority: 1 - One    Complex tear of medial meniscus of right knee as current injury 01/04/2017    Rectus diastasis 06/03/2016    DDD (degenerative disc disease), lumbar 05/16/2016    PVC's (premature ventricular contractions)     B12 deficiency 10/17/2014    Knee pain 08/04/2014    HLD (hyperlipidemia) 03/26/2013    SOB (shortness of breath) 10/11/2012    Vitamin D deficiency 04/27/2012    Colon ulcer, aphthous 04/01/2010       Past Medical History:   Diagnosis Date    Adverse effect of anesthesia     went apneic [place] [facility] [place]    Anesthesia complication 8304    APNEA DURING HERNIA REPAIR, POST OPERATIVE INTUBATION AT [facility] [place]    Arrhythmia     FREQUENT PVCS    Arthritis     Asthma     Cancer ([address]     SKIN- UNKNOWN    Colon ulcer, aphthous 4/1/2010    Finger amputation, traumatic 2003    R 2ND DIGIT    GERD (gastroesophageal reflux disease) 4/1/2010    High cholesterol 4/1/2010    HTN (hypertension), benign 4/1/2010    Hx MRSA infection 2013    CYSTS BL LEGS    Sleep apnea     CPAP       No Known Allergies    Past Surgical History:   Procedure Laterality Date    ABDOMEN SURGERY PROC UNLISTED  2001    hernia    HX APPENDECTOMY  1959    HX ORTHOPAEDIC  1994    left knee    HX OTHER SURGICAL  1990S    CERVICAL- UNKNOWN    HX OTHER SURGICAL      R HAND 2ND DIGIT TRAUMATIC AMPUTATION REPAIR    HX TONSIL AND ADENOIDECTOMY         Social History     Social History    Marital status:      Spouse name: N/A    Number of children: N/A    Years of education: N/A     Social History Main Topics    Smoking status: Former Smoker     Packs/day: 3.00     Years: 35.00     Quit date: 1/3/2002    Smokeless tobacco: Never Used    Alcohol use 1.2 oz/week     2 Shots of liquor, 0 Standard drinks or equivalent per week    Drug use: No    Sexual activity: Yes     Partners: Female     Birth control/ protection: None     Other Topics Concern    None     Social History Narrative       Review of Systems   Constitutional: Positive for malaise/fatigue. Negative for chills, fever and weight loss. HENT: Positive for congestion. Negative for hearing loss. Eyes: Negative. Negative for blurred vision and double vision.    Respiratory: Positive for cough, sputum production and wheezing. Negative for hemoptysis and shortness of breath. Cardiovascular: Negative. Negative for chest pain, palpitations and orthopnea. Gastrointestinal: Negative. Negative for abdominal pain, blood in stool, heartburn, nausea and vomiting. Genitourinary: Negative. Negative for dysuria, frequency and urgency. Musculoskeletal: Negative. Negative for back pain, myalgias and neck pain. Skin: Negative. Negative for rash. Neurological: Negative. Negative for dizziness, tingling, tremors, weakness and headaches. Endo/Heme/Allergies: Negative. Psychiatric/Behavioral: Negative. Negative for depression. Objective:     Vitals:    03/17/17 0807   BP: 133/77   Pulse: 60   Resp: 20   Temp: 97.8 °F (36.6 °C)   TempSrc: Oral   SpO2: 98%   Weight: 244 lb 9.6 oz (110.9 kg)   Height: 5' 11\" (1.803 m)      Body mass index is 34.11 kg/(m^2). Physical Exam   Constitutional: He is oriented to person, place, and time and well-developed, well-nourished, and in no distress. HENT:   Head: Normocephalic and atraumatic. Right Ear: External ear normal.   Left Ear: External ear normal.   Oropharynx red   Eyes: Right eye exhibits no discharge. Left eye exhibits no discharge. No scleral icterus. Neck: No tracheal deviation present. No thyromegaly present. No bruit. Cardiovascular: Normal rate, regular rhythm and normal heart sounds. Pulmonary/Chest: Effort normal.   Sounds tight with slight wheezing on cough. Scattered wet rhonchi. Abdominal: Soft. Neurological: He is alert and oriented to person, place, and time. Skin: No rash noted. No erythema. Psychiatric: Mood and affect normal.   Nursing note and vitals reviewed. There are no preventive care reminders to display for this patient. Assessment and orders:       ICD-10-CM ICD-9-CM    1. HTN (hypertension), benign-controlled with increase of medication    I10 401.1  same medications    2.  Acute non-recurrent maxillary sinusitis   J01.00 461.0 Cefzil 500 mg twice daily for 10 days    3. Acute bronchitis, unspecified organism   J20.9 466.0 \"   4. Wheezing R06.2 786.07  continue albuterol nebulizer half dose          Plan of care:  Discussed diagnoses in detail with patient. Medication risks/benefits/side effects discussed with patient. All of the patient's questions were addressed. The patient understands and agrees with our plan of care. The patient knows to call back if they are unsure of or forget any changes we discussed today or if the symptoms change. The patient received an After-Visit Summary which contains VS, orders, medication list and allergy list. This can be used as a \"mini-medical record\" should they have to seek medical care while out of town. Patient Care Team:  Renato Hanson MD as PCP - Ashutosh Degroot MD (Cardiology)  Nas Kathleen MD (Dermatology)  Ever Riggins MD (Urology)  Cassandra Figueroa MD (Gastroenterology)  Kasie Giron MD (Surgery)  Ritika Wilkinson MD (Orthopedic Surgery)  Demetri Mayer MD (Orthopedic Surgery)    Follow-up Disposition:  Return if symptoms worsen or fail to improve.     Future Appointments  Date Time Provider Erin Corona   9/5/2017 8:00 AM Renato Hanson MD [facility] SCHED       Signed By: Renato Hanson MD     March 17, 2017

## 2022-10-21 NOTE — DISCHARGE INSTRUCTIONS
Thank you for coming to our Emergency Department today. It is important to remember that some problems are difficult to diagnose and may not be found during your first visit. Be sure to follow up with your primary care doctor and review any labs/imaging that was performed with them. If you do not have a primary care doctor, you may contact the one listed on your discharge paperwork or you may also call the [facility] Appointment Desk at [phone] to schedule an appointment with one.     All medications may potentially have side effects and it is impossible to predict which medications may give you side effects. If you feel that you are having a negative effect of any medication you should immediately stop taking them and seek medical attention.    Return to the ER with any questions/concerns, new/concerning symptoms, worsening or failure to improve. Do not drive or make any important decisions for 24 hours if you have received any pain medications, sedatives or mood altering drugs during your ER visit.

## 2022-10-21 NOTE — ED NOTES
Pt presents to ed via EMS from dialysis center for eval of SOB. Pt reports hx of anxiety.Pt states that she feels anxious at this time. Pt goes to dialysis 3x  week. Last session was 2 days ago. Patient has access port to R chest wall. Pt is on 2L NC at home and has 2L NC in place. Cardiac monitor placed on patient. VS stable. Will cont to monitor at this time

## 2022-10-21 NOTE — ED NOTES
Pt sleeping at this time. NAD noted. Resp even and unlabored. To repeat Trop. Family at bedside. Will continue to monitor.

## 2022-10-24 ENCOUNTER — PES CALL (OUTPATIENT)
Dept: ADMINISTRATIVE | Facility: CLINIC | Age: 77
End: 2022-10-24
Payer: MEDICARE

## 2022-10-24 RX ORDER — HYDROCODONE BITARTRATE AND ACETAMINOPHEN 10; 325 MG/1; MG/1
1 TABLET ORAL 3 TIMES DAILY PRN
Qty: 90 TABLET | Refills: 0 | Status: SHIPPED | OUTPATIENT
Start: 2022-11-02 | End: 2022-11-22 | Stop reason: SDUPTHER

## 2022-10-25 ENCOUNTER — OUTPATIENT CASE MANAGEMENT (OUTPATIENT)
Dept: ADMINISTRATIVE | Facility: OTHER | Age: 77
End: 2022-10-25
Payer: MEDICARE

## 2022-10-25 ENCOUNTER — PES CALL (OUTPATIENT)
Dept: ADMINISTRATIVE | Facility: CLINIC | Age: 77
End: 2022-10-25
Payer: MEDICARE

## 2022-10-31 ENCOUNTER — EXTERNAL HOME HEALTH (OUTPATIENT)
Dept: HOME HEALTH SERVICES | Facility: HOSPITAL | Age: 77
End: 2022-10-31
Payer: MEDICARE

## 2022-10-31 ENCOUNTER — PATIENT MESSAGE (OUTPATIENT)
Dept: ADMINISTRATIVE | Facility: OTHER | Age: 77
End: 2022-10-31
Payer: MEDICARE

## 2022-10-31 ENCOUNTER — DOCUMENT SCAN (OUTPATIENT)
Dept: HOME HEALTH SERVICES | Facility: HOSPITAL | Age: 77
End: 2022-10-31
Payer: MEDICARE

## 2022-11-01 ENCOUNTER — TELEPHONE (OUTPATIENT)
Dept: ORTHOPEDICS | Facility: CLINIC | Age: 77
End: 2022-11-01
Payer: MEDICARE

## 2022-11-01 ENCOUNTER — PATIENT MESSAGE (OUTPATIENT)
Dept: ORTHOPEDICS | Facility: CLINIC | Age: 77
End: 2022-11-01
Payer: MEDICARE

## 2022-11-01 ENCOUNTER — CARE AT HOME (OUTPATIENT)
Dept: HOME HEALTH SERVICES | Facility: CLINIC | Age: 77
End: 2022-11-01
Payer: MEDICARE

## 2022-11-01 VITALS
RESPIRATION RATE: 18 BRPM | SYSTOLIC BLOOD PRESSURE: 136 MMHG | OXYGEN SATURATION: 97 % | DIASTOLIC BLOOD PRESSURE: 70 MMHG | HEART RATE: 77 BPM

## 2022-11-01 DIAGNOSIS — J96.11 CHRONIC RESPIRATORY FAILURE WITH HYPOXIA AND HYPERCAPNIA: ICD-10-CM

## 2022-11-01 DIAGNOSIS — J44.1 COPD EXACERBATION: ICD-10-CM

## 2022-11-01 DIAGNOSIS — Z99.2 ESRD (END STAGE RENAL DISEASE) ON DIALYSIS: Chronic | ICD-10-CM

## 2022-11-01 DIAGNOSIS — F41.9 ANXIETY: Primary | ICD-10-CM

## 2022-11-01 DIAGNOSIS — J10.1 INFLUENZA A VIRUS PRESENT: ICD-10-CM

## 2022-11-01 DIAGNOSIS — N18.6 ESRD (END STAGE RENAL DISEASE) ON DIALYSIS: Chronic | ICD-10-CM

## 2022-11-01 DIAGNOSIS — M25.532 LEFT WRIST PAIN: Primary | ICD-10-CM

## 2022-11-01 DIAGNOSIS — J43.2 CENTRILOBULAR EMPHYSEMA: Chronic | ICD-10-CM

## 2022-11-01 DIAGNOSIS — N25.81 SECONDARY HYPERPARATHYROIDISM: ICD-10-CM

## 2022-11-01 DIAGNOSIS — F43.23 ADJUSTMENT REACTION WITH ANXIETY AND DEPRESSION: ICD-10-CM

## 2022-11-01 DIAGNOSIS — Z71.89 ADVANCE CARE PLANNING: ICD-10-CM

## 2022-11-01 DIAGNOSIS — J96.12 CHRONIC RESPIRATORY FAILURE WITH HYPOXIA AND HYPERCAPNIA: ICD-10-CM

## 2022-11-01 PROBLEM — F39 UNSPECIFIED MOOD (AFFECTIVE) DISORDER: Status: ACTIVE | Noted: 2022-11-01

## 2022-11-01 PROCEDURE — 99497 ADVNCD CARE PLAN 30 MIN: CPT | Mod: S$GLB,,, | Performed by: NURSE PRACTITIONER

## 2022-11-01 PROCEDURE — 99350 PR HOME VISIT,ESTAB PATIENT,LEVEL IV: ICD-10-PCS | Mod: S$GLB,,, | Performed by: NURSE PRACTITIONER

## 2022-11-01 PROCEDURE — 99497 PR ADVNCD CARE PLAN 30 MIN: ICD-10-PCS | Mod: S$GLB,,, | Performed by: NURSE PRACTITIONER

## 2022-11-01 PROCEDURE — 99499 UNLISTED E&M SERVICE: CPT | Mod: S$GLB,,, | Performed by: NURSE PRACTITIONER

## 2022-11-01 PROCEDURE — 99499 RISK ADDL DX/OHS AUDIT: ICD-10-PCS | Mod: S$GLB,,, | Performed by: NURSE PRACTITIONER

## 2022-11-01 PROCEDURE — 99350 HOME/RES VST EST HIGH MDM 60: CPT | Mod: S$GLB,,, | Performed by: NURSE PRACTITIONER

## 2022-11-01 RX ORDER — ALBUTEROL SULFATE 90 UG/1
2 AEROSOL, METERED RESPIRATORY (INHALATION) EVERY 6 HOURS PRN
Qty: 18 G | Refills: 3 | Status: SHIPPED | OUTPATIENT
Start: 2022-11-01 | End: 2023-03-01

## 2022-11-01 RX ORDER — BUSPIRONE HYDROCHLORIDE 10 MG/1
10 TABLET ORAL 2 TIMES DAILY
Qty: 60 TABLET | Refills: 11 | Status: SHIPPED | OUTPATIENT
Start: 2022-11-01 | End: 2023-08-18

## 2022-11-01 RX ORDER — LORAZEPAM 0.5 MG/1
0.5 TABLET ORAL EVERY 12 HOURS PRN
Qty: 30 TABLET | Refills: 1 | Status: SHIPPED | OUTPATIENT
Start: 2022-11-01 | End: 2022-12-01 | Stop reason: SDUPTHER

## 2022-11-01 NOTE — ASSESSMENT & PLAN NOTE
Anxiety related to health  Poor appetite, staying in bed  Denies any SI or HI, does report some depression  Reports very anxious using Ativan 0.5mg daily, difficult to attend dialysis due to anxiety  Previously on Busipar-found in home-not currently taking  Resume busipar 10mg BID.  May use Ativan for dialysis days

## 2022-11-01 NOTE — ASSESSMENT & PLAN NOTE
Chronic  Related to Emphysema  Using oxygen at 2L via NC  Sat 98 on 2L  Trelegy in place  Albuterol inhaler refilled  Monitor

## 2022-11-01 NOTE — ASSESSMENT & PLAN NOTE
Discussion held with pt and/or family related to advanced care planning.  Discussed end of life goals and pt's wishes regarding end of care.  Encouraged designation of a HPOA and discussing wishes with them  Reviewed living will, [place] Post, and pt's wishes regarding life support and tube feeding.  Reviewed pt's current code status and prognosis.   Pt elects DNR status  Reports she has considered halting dialysis sessions  L[facility]ost completed in home via EPIC  30 minutes spent in discussion of these services.

## 2022-11-01 NOTE — TELEPHONE ENCOUNTER
Krunal signed, please mail to patient.    Thanks,  Shanti Avendaño MD/MPH  [facility]  [phone] spectralink

## 2022-11-01 NOTE — PROGRESS NOTES
[facility]  Transition of Care Home Visit    Visit Date: 11/1/2022  Encounter Provider: Katharine Urbina   PCP:  Kingston Verduzco MD    PRESENTING HISTORY      Patient ID: Katie Quevedo is a 77 y.o. female.    Consult Requested By:  Dr. Jennifer Don-*  Reason for Consult:  Hospital Follow Up.    Katie is being seen at home due to being seen at home due to physical debility that presents a taxing effort to leave the home, to mitigate high risk of hospital readmission and/or due to the limited availability of reliable or safe options for transportation to the point of access to the provider. Prior to treatment on this visit the chart was reviewed and patient verbal consent was obtained.      Chief Complaint: Transitional Care        History of Present Illness: Ms. Katie Quevedo is a 77 y.o. female who was recently admitted to the hospital.  Admitted 10/11/22 thru 10/20/22    Acute on chronic respiratory failure  Likely secondary to influenza infection.  Vaccinated for influenza about 4 months ago.  Tamiflu X 5 days     Will complete 5 days of antibiotics (cefepime/vancomycin X 3 days followed by ceftriaxone X 2 days) for suspected secondary bacterial infection given hypotension.  Blood cultures negative.     Continue scheduled inhalers     Reported to be on 2 L oxygen at baseline for COPD, confirmed with patient's .  Wean oxygen as tolerated.     Hypotension  Hypotension with systolics in the 60s noted on 10/12  Transiently responded to 1 L IV fluid bolus and was placed on Levophed for a few hours, now off pressors      Blood cultures show no growth till date.  Suspect infectious/sepsis picture. completed 5 days of antibiotics for presumed secondary bacterial infection.  Echocardiogram showed preserved LVEF.        Chronic respiratory failure  On 3 L oxygen at baseline for COPD        Influenza A virus present  will treat influenza with a five-day course of  Tamiflu.  ___________________________________________________________________    Today:    HPI:  Pt is being seen today for hospital follow up, and at the request of her daughter to establish home based care. She has multiple medical conditions, COPD, ESRD on dialysis, chronic pain, resp failure with oxygen dependence. See hospital course, she was recently admitted for flu and pneumonia. She has completed tamiflu and antibiotics. She reports feeling better but weak.  reports appetite is poor and she does not want to get out of bed. PT is present during this visit and she is participating and out of bed to couch,    She expresses her main issues is anxiety. She gets very anxious at dialysis and  has to sit with her thru treatment, she has been taking a dose of Ativan 0.5mg daily to assist. She expresses some desire to stop dialysis treatments, she understands the ramification of that choice and says it is in God's hands. She reports DNR status. Reviewed palliative care and hospice with pt and she will notify if she decides to proceed.         Review of Systems   Constitutional:  Positive for appetite change (poor). Negative for activity change, fatigue and fever.   HENT: Negative.     Eyes: Negative.    Respiratory:  Positive for cough and shortness of breath. Negative for chest tightness.    Cardiovascular: Negative.  Negative for leg swelling.   Gastrointestinal: Negative.    Endocrine: Negative.    Genitourinary: Negative.    Musculoskeletal:  Positive for gait problem.   Skin: Negative.    Allergic/Immunologic: Negative.    Neurological:  Positive for weakness (generalized).   Hematological: Negative.    Psychiatric/Behavioral: Negative.  Negative for agitation.    All other systems reviewed and are negative.    Assessments:  Environmental: single family home, lives with spouse, dim light, multiple pets  Functional Status:   Safety:   Nutritional:   Home Health/DME/Supplies: [facility], oxygen    PAST  HISTORY:     Past Medical History:   Diagnosis Date    Acute congestive heart failure 02/10/2020    Anemia     Bilateral renal cysts     Cataract     Chronic LBP 7/26/2012    Chronic pain     CKD (chronic kidney disease), stage IV     Colon polyp 2013    COPD (chronic obstructive pulmonary disease)     Dehydration     Encounter for blood transfusion     HTN (hypertension)     Lumbar spondylosis     Melanoma     of the lip    Metabolic bone disease     Migraines, neuralgic     Osteoporosis     Primary osteoarthritis of both knees     s/p Rt TKA    Pulmonary embolism with infarction     Seizures 1972    x1 only    Subdeltoid bursitis, L>R. 9/27/2012    Ulcer     Vitamin D deficiency disease        Past Surgical History:   Procedure Laterality Date    BLADDER SUSPENSION      CATARACT EXTRACTION  11/18/13    left eye    CERVICAL LAMINECTOMY      x3, fusion x1    COLONOSCOPY  2009    DECLOTTING OF ARTERIOVENOUS GRAFT Left 1/3/2022    Procedure: [id number];  Surgeon: Lindsey Louie MD;  Location: [facility] OR;  Service: Vascular;  Laterality: Left;    DECLOTTING OF ARTERIOVENOUS GRAFT Left 2/8/2022    Procedure: [id number];  Surgeon: Lindsey Louie MD;  Location: [facility] OR;  Service: Vascular;  Laterality: Left;    DECLOTTING OF ARTERIOVENOUS GRAFT Left 4/8/2022    Procedure: [id number]-RF;  Surgeon: Lindsey Louie MD;  Location: [facility] OR;  Service: Vascular;  Laterality: Left;    FISTULOGRAM N/A 2/27/2020    Procedure: Fistulogram;  Surgeon: Noe Benitez MD;  Location: [facility] CATH LAB/EP;  Service: Cardiology;  Laterality: N/A;    HYSTERECTOMY      JOINT REPLACEMENT  2001    total right knee     LUMBAR LAMINECTOMY      x 3, fusion x1    OOPHORECTOMY      PERIPHERAL ANGIOGRAPHY N/A 3/9/2020    Procedure: Peripheral angiography;  Surgeon: Jacinto Henriquez MD;  Location: [facility] CATH LAB/EP;  Service: Cardiology;  Laterality: N/A;    PLACEMENT OF ARTERIOVENOUS GRAFT Left 1/21/2020     Procedure: INSERTION, GRAFT, ARTERIOVENOUS;  Surgeon: Lindsey Louie MD;  Location: [facility] OR;  Service: General;  Laterality: Left;    PLACEMENT OF ARTERIOVENOUS GRAFT Right 7/22/2022    Procedure: INSERTION, GRAFT, ARTERIOVENOUS;  Surgeon: Lindsey Louie MD;  Location: [facility] OR;  Service: General;  Laterality: Right;    PLACEMENT OF DUAL-LUMEN VASCULAR CATHETER Right 4/16/2022    Procedure: INSERTION-CATHETER-RICKI;  Surgeon: Lindsey Louie MD;  Location: [facility] OR;  Service: General;  Laterality: Right;    THROMBECTOMY Left 2/3/2020    Procedure: THROMBECTOMY;  Surgeon: Lindsey Louie MD;  Location: [facility] OR;  Service: General;  Laterality: Left;    THROMBECTOMY  3/9/2020    Procedure: Thrombectomy;  Surgeon: Jacinto Henriquez MD;  Location: [facility] CATH LAB/EP;  Service: Cardiology;;    THROMBECTOMY Left 4/7/2022    Procedure: THROMBECTOMY;  Surgeon: Lindsey Louie MD;  Location: [facility] OR;  Service: General;  Laterality: Left;       Family History   Problem Relation Age of Onset    Arthritis Mother     Stroke Mother     Hypertension Father     Cancer Father     Cataracts Father     Diabetes Maternal Aunt     Hypertension Maternal Grandfather     Heart disease Maternal Grandfather     Heart attack Maternal Grandfather     Cataracts Sister     Glaucoma Cousin        Social History     Socioeconomic History    Marital status:    Tobacco Use    Smoking status: Former     Types: Cigarettes    Smokeless tobacco: Former     Quit date: 2/3/2015   Substance and Sexual Activity    Alcohol use: Not Currently     Comment: Rare    Drug use: No    Sexual activity: Never     Partners: Male     Social Determinants of Health     Financial Resource Strain: Low Risk     Difficulty of Paying Living Expenses: Not hard at all   Food Insecurity: No Food Insecurity    Worried About Running Out of Food in the Last Year: Never true    Ran Out of Food in the Last Year: Never true   Transportation Needs: No  Transportation Needs    Lack of Transportation (Medical): No    Lack of Transportation (Non-Medical): No   Physical Activity: Inactive    Days of Exercise per Week: 0 days    Minutes of Exercise per Session: 0 min   Stress: No Stress Concern Present    Feeling of Stress : Only a little   Housing Stability: Low Risk     Unable to Pay for Housing in the Last Year: No    Number of Places Lived in the Last Year: 1    Unstable Housing in the Last Year: No       MEDICATIONS & ALLERGIES:     Current Outpatient Medications on File Prior to Visit   Medication Sig Dispense Refill    acetaminophen (TYLENOL) 325 MG tablet Take 1 tablet (325 mg total) by mouth every 6 (six) hours as needed for Pain. 10 tablet 0    albuterol-ipratropium (DUO-NEB) 2.5 mg-0.5 mg/3 mL nebulizer solution Take 3 mLs by nebulization every 6 (six) hours as needed for Shortness of Breath. Rescue 180 mL 11    allopurinoL (ZYLOPRIM) 100 MG tablet Take 1 tablet (100 mg total) by mouth on Tuesday, Thursday, Saturday, and Sunday. 30 tablet 0    amLODIPine (NORVASC) 5 MG tablet Take 5 mg by mouth once daily.      atorvastatin (LIPITOR) 80 MG tablet Take 80 mg by mouth once daily.      diclofenac sodium (VOLTAREN) 1 % Gel Apply 2 g topically 3 (three) times daily as needed (Apply to painful joints).      diphenhydrAMINE (BENADRYL) 25 mg capsule Take 25 mg by mouth every 6 (six) hours as needed for Itching.      DULoxetine (CYMBALTA) 30 MG capsule Take 1 capsule (30 mg total) by mouth once daily. 30 capsule 3    epoetin hemant-epbx (RETACRIT) 10,000 unit/mL imjection Inject 0.5 mLs (5,000 Units total) into the skin every Mon, Wed, Fri.      fluticasone-umeclidin-vilanter (TRELEGY ELLIPTA) 200-62.5-25 mcg inhaler Inhale 1 puff into the lungs once daily. 60 each 11    [START ON 11/2/2022] HYDROcodone-acetaminophen (NORCO)  mg per tablet Take 1 tablet by mouth 3 (three) times daily as needed for Pain. 90 tablet 0    megestroL (MEGACE) 40 MG Tab Take 40 mg by  mouth once daily.      metoprolol tartrate (LOPRESSOR) 25 MG tablet Take 1 tablet (25 mg total) by mouth 2 (two) times daily. 60 tablet 11    ondansetron 4 mg/2 mL Soln       sevelamer carbonate (RENVELA) 800 mg Tab Take 1 tablet (800 mg total) by mouth after meals as needed. 90 tablet 11    [DISCONTINUED] albuterol (VENTOLIN HFA) 90 mcg/actuation inhaler Inhale 2 puffs into the lungs every 6 (six) hours as needed for Wheezing or Shortness of Breath. Rescue 18 g 3    [DISCONTINUED] busPIRone (BUSPAR) 10 MG tablet Take 10 mg by mouth once daily.      [DISCONTINUED] LORazepam (ATIVAN) 0.5 MG tablet Take 1 tablet (0.5 mg total) by mouth every 12 (twelve) hours as needed for Anxiety. 30 tablet 0    [DISCONTINUED] mirtazapine (REMERON) 15 MG tablet Take 1 tablet (15 mg total) by mouth every evening. 30 tablet 11     No current facility-administered medications on file prior to visit.        Review of patient's allergies indicates:   Allergen Reactions    Aspirin      Other reaction(s): hx of ulcers    Tetracycline Swelling     Other reaction(s): Swelling    Penicillins Rash     Other reaction(s): Hives  Other reaction(s): Rash  Other reaction(s): Rash  Other reaction(s): Hives       OBJECTIVE:     Vital Signs:  Vitals:    11/01/22 1144   BP: 136/70   Pulse: 77   Resp: 18     There is no height or weight on file to calculate BMI.     Physical Exam:  Physical Exam  Vitals reviewed.   Constitutional:       General: She is not in acute distress.     Appearance: She is well-developed. She is ill-appearing.      Comments: thin   HENT:      Head: Normocephalic and atraumatic.      Nose: Nose normal.      Mouth/Throat:      Mouth: Mucous membranes are dry.   Eyes:      Pupils: Pupils are equal, round, and reactive to light.   Cardiovascular:      Rate and Rhythm: Normal rate and regular rhythm.      Heart sounds: Normal heart sounds.      Comments: Dialysis access to right upper chest  Pulmonary:      Effort: Pulmonary effort is  normal.      Comments: Diminished, wet cough noted  Abdominal:      General: Bowel sounds are normal.      Palpations: Abdomen is soft.   Musculoskeletal:         General: Normal range of motion.      Cervical back: Normal range of motion and neck supple.      Comments: Muscle atrophy   Skin:     General: Skin is warm and dry.   Neurological:      Mental Status: She is alert and oriented to person, place, and time.      Cranial Nerves: No cranial nerve deficit.   Psychiatric:         Behavior: Behavior normal.         Thought Content: Thought content normal.         Judgment: Judgment normal.       Laboratory  Lab Results   Component Value Date    WBC 11.31 10/21/2022    HGB 11.9 (L) 10/21/2022    HCT 38.3 10/21/2022    MCV 98 10/21/2022     10/21/2022     Lab Results   Component Value Date    INR 1.1 11/10/2020    INR 1.1 07/06/2020    INR 1.2 06/11/2020     Lab Results   Component Value Date    HGBA1C 5.2 01/22/2018     No results for input(s): POCTGLUCOSE in the last 72 hours.    Diagnostic Results:      TRANSITION OF CARE:     [facility] Contact Note: 10/24/22    Family and/or Caretaker present at visit?  Yes.  Diagnostic tests reviewed/disposition: No diagnosic tests pending after this hospitalization.  Disease/illness education:   Home health/community services discussion/referrals: Patient has home health established at [facility] .   Establishment or re-establishment of referral orders for community resources: No other necessary community resources.   Discussion with other health care providers: No discussion with other health care providers necessary.     Transition of Care Visit:  I have reviewed and updated the history and problem list.  I have reconciled the medication list.  I have discussed the hospitalization and current medical issues, prognosis and plans with the patient/family.  I  spent more than 50% of time discussing the care with the patient/family.  Total Face-to-Face Encounter: 60  minutes.    Medications Reconciliation:   I have reconciled the patient's home medications and discharge medications with the patient/family. I have updated all changes.  Refer to After-Visit Medication List.    ASSESSMENT & PLAN:     HIGH RISK CONDITION(S):  CHF. COPD. ESRD    Problem List Items Addressed This Visit          Psychiatric    Adjustment reaction with anxiety and depression    Current Assessment & Plan     Anxiety related to health  Poor appetite, staying in bed  Denies any SI or HI, does report some depression  Reports very anxious using Ativan 0.5mg daily, difficult to attend dialysis due to anxiety  Previously on Busipar-found in home-not currently taking  Resume busipar 10mg BID.  May use Ativan for dialysis days              Pulmonary    severe stage 4 COPD    Current Assessment & Plan     See respiratory failure         Chronic respiratory failure    Overview     On oxygen at baseline         Current Assessment & Plan     Chronic  Related to Emphysema  Using oxygen at 2L via [place]  Sat 98 on 2L  Trelegy in place  Albuterol inhaler refilled  Monitor         Relevant Orders    Ambulatory referral/consult to [facility]       Renal/    ESRD (end stage renal disease) on dialysis (Chronic)    Overview                Current Assessment & Plan     Followed by nephrology  Remain compliant with chair appts  Renal diet  Meds renal dosed  Monitor         Relevant Orders    Ambulatory referral/consult to [facility]       ID    Influenza A virus present    Current Assessment & Plan     Recent hospitalization for flu  Completed Tamiflu              Endocrine    Secondary hyperparathyroidism    Current Assessment & Plan     Related to ESRD  Followed by nephrology  Renvela in place  Monitor            Palliative Care    Advance care planning    Current Assessment & Plan     Discussion held with pt and/or family related to advanced care planning.  Discussed end of life goals and pt's wishes regarding  end of care.  Encouraged designation of a HPOA and discussing wishes with them  Reviewed living will, [place] Post, and pt's wishes regarding life support and tube feeding.  Reviewed pt's current code status and prognosis.   Pt elects DNR status  Reports she has considered halting dialysis sessions  [facility] completed in home via EP[facility]  30 minutes spent in discussion of these services.            Other Visit Diagnoses       Anxiety    -  Primary    Relevant Medications    busPIRone (BUSPAR) 10 MG tablet    LORazepam (ATIVAN) 0.5 MG tablet    Centrilobular emphysema  (Chronic)       Relevant Medications    albuterol (VENTOLIN HFA) 90 mcg/actuation inhaler    Other Relevant Orders    [facility]             Were controlled substances prescribed?  No    Instructions for the patient:  - Continue all medications, treatments and therapies as ordered.   - Follow all instructions, recommendations as discussed.  - Maintain Safety Precautions at all times.  - Attend all medical appointments as scheduled.  - For worsening symptoms: call Primary Care Physician or Nurse Practitioner.  - For emergencies, call 911 or immediately report to the nearest emergency room.  - Limit Risks of environmental exposure to coronavirus/COVID-19 as discussed including: social distancing, hand hygiene, and limiting departures from the home for necessities only.     Scheduled Follow-up :  Future Appointments   Date Time Provider Department Center   11/2/2022  8:15 AM [facility]ETH XR1 300 LB LIMIT METH XRAY [place]   11/2/2022  8:30 AM Maritza Calhoun MD [facility] ORTHO [place]   12/6/2022 10:20 AM Jacinto Henriquez MD [facility]       After Visit Medication List :     Medication List            Accurate as of November 1, 2022  3:46 PM. If you have any questions, ask your nurse or doctor.                CHANGE how you take these medications      busPIRone 10 MG tablet  Commonly known as: BUSPAR  Take 1 tablet (10 mg  total) by mouth 2 (two) times daily.  What changed: when to take this  Changed by: Katharine Urbina NP            CONTINUE taking these medications      acetaminophen 325 MG tablet  Commonly known as: TYLENOL  Take 1 tablet (325 mg total) by mouth every 6 (six) hours as needed for Pain.     albuterol 90 mcg/actuation inhaler  Commonly known as: VENTOLIN HFA  Inhale 2 puffs into the lungs every 6 (six) hours as needed for Wheezing or Shortness of Breath. Rescue     albuterol-ipratropium 2.5 mg-0.5 mg/3 mL nebulizer solution  Commonly known as: DUO-NEB  Take 3 mLs by nebulization every 6 (six) hours as needed for Shortness of Breath. Rescue     allopurinoL 100 MG tablet  Commonly known as: ZYLOPRIM  Take 1 tablet (100 mg total) by mouth on Tuesday, Thursday, Saturday, and Sunday.     amLODIPine 5 MG tablet  Commonly known as: NORVASC     atorvastatin 80 MG tablet  Commonly known as: LIPITOR     diclofenac sodium 1 % Gel  Commonly known as: VOLTAREN  Apply 2 g topically 3 (three) times daily as needed (Apply to painful joints).     diphenhydrAMINE 25 mg capsule  Commonly known as: BENADRYL     DULoxetine 30 MG capsule  Commonly known as: CYMBALTA  Take 1 capsule (30 mg total) by mouth once daily.     epoetin hemant-epbx 10,000 unit/mL imjection  Commonly known as: RETACRIT  Inject 0.5 mLs (5,000 Units total) into the skin every Mon, Wed, Fri.     HYDROcodone-acetaminophen  mg per tablet  Commonly known as: NORCO  Take 1 tablet by mouth 3 (three) times daily as needed for Pain.  Start taking on: November 2, 2022     LORazepam 0.5 MG tablet  Commonly known as: ATIVAN  Take 1 tablet (0.5 mg total) by mouth every 12 (twelve) hours as needed for Anxiety.     megestroL 40 MG Tab  Commonly known as: MEGACE     metoprolol tartrate 25 MG tablet  Commonly known as: LOPRESSOR  Take 1 tablet (25 mg total) by mouth 2 (two) times daily.     ondansetron 4 mg/2 mL Soln     sevelamer carbonate 800 mg Tab  Commonly known as:  RENVELA  Take 1 tablet (800 mg total) by mouth after meals as needed.     TRELEGY ELLIPTA 200-62.5-25 mcg inhaler  Generic drug: fluticasone-umeclidin-vilanter  Inhale 1 puff into the lungs once daily.               Where to Get Your Medications        These medications were sent to [facility]/pharmacy #[id number] - [place] - [address] [facility]  [address] [place] [address]      Phone: [phone]   albuterol 90 mcg/actuation inhaler  busPIRone 10 MG tablet  LORazepam 0.5 MG tablet         Signature: Katharine Urbina NP

## 2022-11-01 NOTE — TELEPHONE ENCOUNTER
LVM for  the patient. Informed of X-rays scheduled prior to appointment.     Mariya Bruce MA  Medical Assistant to Dr. Ross Dunbar [facility]

## 2022-11-04 ENCOUNTER — HOSPITAL ENCOUNTER (OUTPATIENT)
Facility: HOSPITAL | Age: 77
Discharge: HOME | End: 2022-11-04
Attending: EMERGENCY MEDICINE | Admitting: HOSPITALIST
Payer: MEDICARE

## 2022-11-04 VITALS
HEIGHT: 62 IN | DIASTOLIC BLOOD PRESSURE: 67 MMHG | SYSTOLIC BLOOD PRESSURE: 119 MMHG | WEIGHT: 90 LBS | RESPIRATION RATE: 18 BRPM | HEART RATE: 96 BPM | OXYGEN SATURATION: 100 % | BODY MASS INDEX: 16.56 KG/M2 | TEMPERATURE: 98 F

## 2022-11-04 DIAGNOSIS — E86.0 DEHYDRATION: ICD-10-CM

## 2022-11-04 DIAGNOSIS — Z99.2 ESRD (END STAGE RENAL DISEASE) ON DIALYSIS: ICD-10-CM

## 2022-11-04 DIAGNOSIS — I95.9 HYPOTENSION, UNSPECIFIED HYPOTENSION TYPE: Primary | ICD-10-CM

## 2022-11-04 DIAGNOSIS — I95.9 HYPOTENSION: ICD-10-CM

## 2022-11-04 DIAGNOSIS — N18.6 ESRD (END STAGE RENAL DISEASE) ON DIALYSIS: ICD-10-CM

## 2022-11-04 DIAGNOSIS — Z79.899 POLYPHARMACY: ICD-10-CM

## 2022-11-04 DIAGNOSIS — Z91.89 AT RISK FOR POLYPHARMACY: ICD-10-CM

## 2022-11-04 LAB
ALBUMIN SERPL BCP-MCNC: 2.7 G/DL (ref 3.5–5.2)
ALP SERPL-CCNC: 135 U/L (ref 55–135)
ALT SERPL W/O P-5'-P-CCNC: 13 U/L (ref 10–44)
AMPHET+METHAMPHET UR QL: NEGATIVE
ANION GAP SERPL CALC-SCNC: 15 MMOL/L (ref 8–16)
AST SERPL-CCNC: 21 U/L (ref 10–40)
BACTERIA #/AREA URNS HPF: ABNORMAL /HPF
BARBITURATES UR QL SCN>200 NG/ML: NEGATIVE
BASOPHILS # BLD AUTO: 0.13 K/UL (ref 0–0.2)
BASOPHILS NFR BLD: 1.2 % (ref 0–1.9)
BENZODIAZ UR QL SCN>200 NG/ML: ABNORMAL
BILIRUB SERPL-MCNC: 0.4 MG/DL (ref 0.1–1)
BILIRUB UR QL STRIP: NEGATIVE
BNP SERPL-MCNC: 173 PG/ML (ref 0–99)
BUN SERPL-MCNC: 18 MG/DL (ref 8–23)
BZE UR QL SCN: NEGATIVE
CALCIUM SERPL-MCNC: 8.5 MG/DL (ref 8.7–10.5)
CANNABINOIDS UR QL SCN: ABNORMAL
CHLORIDE SERPL-SCNC: 102 MMOL/L (ref 95–110)
CK SERPL-CCNC: 44 U/L (ref 20–180)
CLARITY UR: CLEAR
CO2 SERPL-SCNC: 21 MMOL/L (ref 23–29)
COLOR UR: YELLOW
CREAT SERPL-MCNC: 2.7 MG/DL (ref 0.5–1.4)
CREAT UR-MCNC: 226.7 MG/DL (ref 15–325)
CTP QC/QA: YES
DIFFERENTIAL METHOD: ABNORMAL
EOSINOPHIL # BLD AUTO: 0.9 K/UL (ref 0–0.5)
EOSINOPHIL NFR BLD: 8.6 % (ref 0–8)
ERYTHROCYTE [DISTWIDTH] IN BLOOD BY AUTOMATED COUNT: 13.8 % (ref 11.5–14.5)
EST. GFR  (NO RACE VARIABLE): 17 ML/MIN/1.73 M^2
GLUCOSE SERPL-MCNC: 84 MG/DL (ref 70–110)
GLUCOSE UR QL STRIP: NEGATIVE
HCT VFR BLD AUTO: 31.2 % (ref 37–48.5)
HGB BLD-MCNC: 9.5 G/DL (ref 12–16)
HGB UR QL STRIP: NEGATIVE
HYALINE CASTS #/AREA URNS LPF: 14 /LPF
IMM GRANULOCYTES # BLD AUTO: 0.09 K/UL (ref 0–0.04)
IMM GRANULOCYTES NFR BLD AUTO: 0.8 % (ref 0–0.5)
INFLUENZA A, MOLECULAR: NEGATIVE
INFLUENZA B, MOLECULAR: NEGATIVE
KETONES UR QL STRIP: NEGATIVE
LACTATE SERPL-SCNC: 0.7 MMOL/L (ref 0.5–2.2)
LEUKOCYTE ESTERASE UR QL STRIP: NEGATIVE
LYMPHOCYTES # BLD AUTO: 1.6 K/UL (ref 1–4.8)
LYMPHOCYTES NFR BLD: 14.8 % (ref 18–48)
MCH RBC QN AUTO: 30.9 PG (ref 27–31)
MCHC RBC AUTO-ENTMCNC: 30.4 G/DL (ref 32–36)
MCV RBC AUTO: 102 FL (ref 82–98)
METHADONE UR QL SCN>300 NG/ML: NEGATIVE
MICROSCOPIC COMMENT: ABNORMAL
MONOCYTES # BLD AUTO: 1.3 K/UL (ref 0.3–1)
MONOCYTES NFR BLD: 12 % (ref 4–15)
NEUTROPHILS # BLD AUTO: 6.8 K/UL (ref 1.8–7.7)
NEUTROPHILS NFR BLD: 62.6 % (ref 38–73)
NITRITE UR QL STRIP: NEGATIVE
NRBC BLD-RTO: 0 /100 WBC
OPIATES UR QL SCN: ABNORMAL
PCP UR QL SCN>25 NG/ML: NEGATIVE
PH UR STRIP: 6 [PH] (ref 5–8)
PLATELET # BLD AUTO: 223 K/UL (ref 150–450)
PMV BLD AUTO: 10 FL (ref 9.2–12.9)
POTASSIUM SERPL-SCNC: 3.8 MMOL/L (ref 3.5–5.1)
PROT SERPL-MCNC: 6.9 G/DL (ref 6–8.4)
PROT UR QL STRIP: ABNORMAL
RBC # BLD AUTO: 3.07 M/UL (ref 4–5.4)
RBC #/AREA URNS HPF: 1 /HPF (ref 0–4)
SARS-COV-2 RDRP RESP QL NAA+PROBE: NEGATIVE
SODIUM SERPL-SCNC: 138 MMOL/L (ref 136–145)
SP GR UR STRIP: 1.02 (ref 1–1.03)
SPECIMEN SOURCE: NORMAL
SQUAMOUS #/AREA URNS HPF: 1 /HPF
TOXICOLOGY INFORMATION: ABNORMAL
TROPONIN I SERPL DL<=0.01 NG/ML-MCNC: 0.02 NG/ML (ref 0–0.03)
URN SPEC COLLECT METH UR: ABNORMAL
UROBILINOGEN UR STRIP-ACNC: ABNORMAL EU/DL
WBC # BLD AUTO: 10.91 K/UL (ref 3.9–12.7)
WBC #/AREA URNS HPF: 2 /HPF (ref 0–5)
YEAST URNS QL MICRO: ABNORMAL

## 2022-11-04 PROCEDURE — 87040 BLOOD CULTURE FOR BACTERIA: CPT | Performed by: EMERGENCY MEDICINE

## 2022-11-04 PROCEDURE — 87502 INFLUENZA DNA AMP PROBE: CPT | Performed by: EMERGENCY MEDICINE

## 2022-11-04 PROCEDURE — 96361 HYDRATE IV INFUSION ADD-ON: CPT

## 2022-11-04 PROCEDURE — 93005 ELECTROCARDIOGRAM TRACING: CPT

## 2022-11-04 PROCEDURE — 85025 COMPLETE CBC W/AUTO DIFF WBC: CPT | Performed by: EMERGENCY MEDICINE

## 2022-11-04 PROCEDURE — 81000 URINALYSIS NONAUTO W/SCOPE: CPT | Mod: 59 | Performed by: EMERGENCY MEDICINE

## 2022-11-04 PROCEDURE — 25000003 PHARM REV CODE 250: Performed by: EMERGENCY MEDICINE

## 2022-11-04 PROCEDURE — 99285 EMERGENCY DEPT VISIT HI MDM: CPT | Mod: 25

## 2022-11-04 PROCEDURE — 93010 EKG 12-LEAD: ICD-10-PCS | Mod: ,,, | Performed by: INTERNAL MEDICINE

## 2022-11-04 PROCEDURE — 83880 ASSAY OF NATRIURETIC PEPTIDE: CPT | Performed by: EMERGENCY MEDICINE

## 2022-11-04 PROCEDURE — 80307 DRUG TEST PRSMV CHEM ANLYZR: CPT | Performed by: EMERGENCY MEDICINE

## 2022-11-04 PROCEDURE — 87635 SARS-COV-2 COVID-19 AMP PRB: CPT | Performed by: EMERGENCY MEDICINE

## 2022-11-04 PROCEDURE — G0378 HOSPITAL OBSERVATION PER HR: HCPCS

## 2022-11-04 PROCEDURE — 93010 ELECTROCARDIOGRAM REPORT: CPT | Mod: ,,, | Performed by: INTERNAL MEDICINE

## 2022-11-04 PROCEDURE — 80053 COMPREHEN METABOLIC PANEL: CPT | Performed by: EMERGENCY MEDICINE

## 2022-11-04 PROCEDURE — 87502 INFLUENZA DNA AMP PROBE: CPT

## 2022-11-04 PROCEDURE — 83605 ASSAY OF LACTIC ACID: CPT | Performed by: EMERGENCY MEDICINE

## 2022-11-04 PROCEDURE — 84484 ASSAY OF TROPONIN QUANT: CPT | Performed by: EMERGENCY MEDICINE

## 2022-11-04 PROCEDURE — 82550 ASSAY OF CK (CPK): CPT | Performed by: EMERGENCY MEDICINE

## 2022-11-04 PROCEDURE — 96360 HYDRATION IV INFUSION INIT: CPT

## 2022-11-04 RX ORDER — DEXAMETHASONE SODIUM PHOSPHATE 4 MG/ML
INJECTION, SOLUTION INTRA-ARTICULAR; INTRALESIONAL; INTRAMUSCULAR; INTRAVENOUS; SOFT TISSUE
Status: ON HOLD | COMMUNITY
Start: 2022-07-22 | End: 2022-12-12 | Stop reason: HOSPADM

## 2022-11-04 RX ORDER — HEPARIN 100 UNIT/ML
SYRINGE INTRAVENOUS
COMMUNITY
Start: 2022-06-15 | End: 2023-01-11

## 2022-11-04 RX ORDER — DICLOFENAC SODIUM 10 MG/G
2 GEL TOPICAL 3 TIMES DAILY PRN
COMMUNITY

## 2022-11-04 RX ORDER — SODIUM CHLORIDE 9 MG/ML
1000 INJECTION, SOLUTION INTRAVENOUS
Status: COMPLETED | OUTPATIENT
Start: 2022-11-04 | End: 2022-11-04

## 2022-11-04 RX ADMIN — SODIUM CHLORIDE 1000 ML: 0.9 INJECTION, SOLUTION INTRAVENOUS at 04:11

## 2022-11-04 NOTE — ED PROVIDER NOTES
Encounter Date: 11/4/2022       History     Chief Complaint   Patient presents with    Hypotension     Brought to ED from HD for hypotension. Pt received 15 minutes of treatment.  Pt denies complaints. Intiail BP was 70/40. Pt was given 400cc NS by HD nurse     Patient is a 77 year old female with a past history of ESRD on dialysis, who presents to the ED with complaint of hypotension.  Patient presented to to her dialysis center for dialysis and was found to be hypotensive with a blood pressure of 70/40.  EMS was called and the patient was brought to the ED for evaluation.  The patient was administered 400 cc of normal saline withoutt significant improvement of her blood pressure.  The patient denies taking any new medications decreased p.o. intake, chest pain, shortness of breath, fever, chills, recent illness, lightheadedness, dizziness, palpitations, near-syncope or syncope or other complaints whatsoever.  Vital signs notable for blood pressure of 70/40 arrival but otherwise unremarkable.  AAOx3 on arrival. No complaints.     Review of patient's allergies indicates:   Allergen Reactions    Aspirin      Other reaction(s): hx of ulcers    Tetracycline Swelling     Other reaction(s): Swelling    Penicillins Rash     Other reaction(s): Hives  Other reaction(s): Rash  Other reaction(s): Rash  Other reaction(s): Hives     Past Medical History:   Diagnosis Date    Acute congestive heart failure 02/10/2020    Anemia     Bilateral renal cysts     Cataract     Chronic LBP 7/26/2012    Chronic pain     CKD (chronic kidney disease), stage IV     Colon polyp 2013    COPD (chronic obstructive pulmonary disease)     Dehydration     Encounter for blood transfusion     HTN (hypertension)     Lumbar spondylosis     Melanoma     of the lip    Metabolic bone disease     Migraines, neuralgic     Osteoporosis     Primary osteoarthritis of both knees     s/p Rt TKA    Pulmonary embolism with infarction     Seizures 1972    x1 only     Subdeltoid bursitis, L>R. 9/27/2012    Ulcer     Vitamin D deficiency disease      Past Surgical History:   Procedure Laterality Date    BLADDER SUSPENSION      CATARACT EXTRACTION  11/18/13    left eye    CERVICAL LAMINECTOMY      x3, fusion x1    COLONOSCOPY  2009    DECLOTTING OF ARTERIOVENOUS GRAFT Left 1/3/2022    Procedure: [id number];  Surgeon: Lindsey Louie MD;  Location: [facility] OR;  Service: Vascular;  Laterality: Left;    DECLOTTING OF ARTERIOVENOUS GRAFT Left 2/8/2022    Procedure: [id number];  Surgeon: Lindsey Louie MD;  Location: [facility] OR;  Service: Vascular;  Laterality: Left;    DECLOTTING OF ARTERIOVENOUS GRAFT Left 4/8/2022    Procedure: [id number]-LA;  Surgeon: Lindsey Louie MD;  Location: [facility] OR;  Service: Vascular;  Laterality: Left;    FISTULOGRAM N/A 2/27/2020    Procedure: Fistulogram;  Surgeon: Noe Benitez MD;  Location: [facility] CATH LAB/EP;  Service: Cardiology;  Laterality: N/A;    HYSTERECTOMY      JOINT REPLACEMENT  2001    total right knee     LUMBAR LAMINECTOMY      x 3, fusion x1    OOPHORECTOMY      PERIPHERAL ANGIOGRAPHY N/A 3/9/2020    Procedure: Peripheral angiography;  Surgeon: Jacinto Henriquez MD;  Location: [facility] CATH LAB/EP;  Service: Cardiology;  Laterality: N/A;    PLACEMENT OF ARTERIOVENOUS GRAFT Left 1/21/2020    Procedure: INSERTION, GRAFT, ARTERIOVENOUS;  Surgeon: Lindsey Louie MD;  Location: [facility] OR;  Service: General;  Laterality: Left;    PLACEMENT OF ARTERIOVENOUS GRAFT Right 7/22/2022    Procedure: INSERTION, GRAFT, ARTERIOVENOUS;  Surgeon: Lindsey Louie MD;  Location: [facility] OR;  Service: General;  Laterality: Right;    PLACEMENT OF DUAL-LUMEN VASCULAR CATHETER Right 4/16/2022    Procedure: INSERTION-CATHETER-RICKI;  Surgeon: Lindsey Louie MD;  Location: [facility];  Service: General;  Laterality: Right;    THROMBECTOMY Left 2/3/2020    Procedure: THROMBECTOMY;  Surgeon: Lindsey Louie MD;   Location: [facility] OR;  Service: General;  Laterality: Left;    THROMBECTOMY  3/9/2020    Procedure: Thrombectomy;  Surgeon: Jacinto Henriquez MD;  Location: [facility] CATH LAB/EP;  Service: Cardiology;;    THROMBECTOMY Left 4/7/2022    Procedure: THROMBECTOMY;  Surgeon: Lindsey Louie MD;  Location: [facility] OR;  Service: General;  Laterality: Left;     Family History   Problem Relation Age of Onset    Arthritis Mother     Stroke Mother     Hypertension Father     Cancer Father     Cataracts Father     Diabetes Maternal Aunt     Hypertension Maternal Grandfather     Heart disease Maternal Grandfather     Heart attack Maternal Grandfather     Cataracts Sister     Glaucoma Cousin      Social History     Tobacco Use    Smoking status: Former     Types: Cigarettes    Smokeless tobacco: Former     Quit date: 2/3/2015   Substance Use Topics    Alcohol use: Not Currently     Comment: Rare    Drug use: No     Review of Systems   Constitutional:  Negative for chills and fever.   Eyes:  Negative for visual disturbance.   Respiratory:  Negative for cough and shortness of breath.    Cardiovascular:  Negative for chest pain, palpitations and leg swelling.   Gastrointestinal:  Negative for abdominal pain, nausea and vomiting.   Musculoskeletal:  Negative for back pain and myalgias.   Skin:  Negative for rash.   Neurological:  Negative for dizziness, tremors, seizures, syncope, facial asymmetry, speech difficulty, weakness, light-headedness, numbness and headaches.   Psychiatric/Behavioral:  Negative for agitation and confusion.      Physical Exam     Initial Vitals [11/04/22 1404]   BP Pulse Resp Temp SpO2   (!) 118/90 94 18 97.8 °F (36.6 °C) (!) 94 %      MAP       --         Physical Exam    Nursing note and vitals reviewed.  Constitutional: She appears well-developed. She is not diaphoretic. No distress.   Non toxic   No acute distress     HENT:   Head: Normocephalic and atraumatic.   Right Ear: External ear normal.   Left Ear:  External ear normal.   Eyes: Conjunctivae and EOM are normal. Pupils are equal, round, and reactive to light.   Neck:   Normal range of motion.  Cardiovascular:  Normal rate, regular rhythm, normal heart sounds and intact distal pulses.           Carotid pulse 2 +bilaterally; femoral pulse 2 +bilaterally; radial pulse 2 +bilaterally; DP pulses 2+ bilaterally   Pulmonary/Chest: Breath sounds normal.   Abdominal: Abdomen is soft. Bowel sounds are normal.   Abdomen soft/NT/ND    Musculoskeletal:         General: Normal range of motion.      Cervical back: Normal range of motion.     Neurological: She is alert and oriented to person, place, and time. She has normal strength. GCS score is 15. GCS eye subscore is 4. GCS verbal subscore is 5. GCS motor subscore is 6.   No focal neurological deficits; strength 5/5 in all 4 extremities; sensation grossly intact; patient is alert oriented to person, place, and time.    Skin: Skin is warm. Capillary refill takes less than 2 seconds.   Psychiatric: She has a normal mood and affect.       ED Course   Critical Care    Date/Time: 11/4/2022 9:45 PM  Performed by: Myron Sheehan MD  Authorized by: Myron Sheehan MD   Direct patient critical care time: 10 minutes  Additional history critical care time: 5 minutes  Ordering / reviewing critical care time: 10 minutes  Documentation critical care time: 5 minutes  Consulting other physicians critical care time: 10 minutes  Consult with family critical care time: 5 minutes  Total critical care time (exclusive of procedural time) : 45 minutes      Labs Reviewed   CBC W/ AUTO DIFFERENTIAL - Abnormal; Notable for the following components:       Result Value    RBC 3.07 (*)     Hemoglobin 9.5 (*)     Hematocrit 31.2 (*)      (*)     MCHC 30.4 (*)     Immature Granulocytes 0.8 (*)     Immature Grans (Abs) 0.09 (*)     Mono # 1.3 (*)     Eos # 0.9 (*)     Lymph % 14.8 (*)     Eosinophil % 8.6 (*)     All other components within  normal limits   COMPREHENSIVE METABOLIC PANEL - Abnormal; Notable for the following components:    CO2 21 (*)     Creatinine 2.7 (*)     Calcium 8.5 (*)     Albumin 2.7 (*)     eGFR 17 (*)     All other components within normal limits   B-TYPE NATRIURETIC PEPTIDE - Abnormal; Notable for the following components:     (*)     All other components within normal limits   URINALYSIS, REFLEX TO URINE CULTURE - Abnormal; Notable for the following components:    Protein, UA 1+ (*)     Urobilinogen, UA 2.0-3.0 (*)     All other components within normal limits    Narrative:     Specimen Source->Urine   DRUG SCREEN PANEL, URINE EMERGENCY - Abnormal; Notable for the following components:    Benzodiazepines Presumptive Positive (*)     Opiate Scrn, Ur Presumptive Positive (*)     THC Presumptive Positive (*)     All other components within normal limits    Narrative:     Specimen Source->Urine   URINALYSIS MICROSCOPIC - Abnormal; Notable for the following components:    Yeast, UA Rare (*)     Hyaline Casts, UA 14 (*)     All other components within normal limits    Narrative:     Specimen Source->Urine   INFLUENZA A & B BY MOLECULAR   CULTURE, BLOOD   CULTURE, BLOOD   TROPONIN I   CK   LACTIC ACID, PLASMA   SARS-COV-2 RDRP GENE     EKG Readings: (Independently Interpreted)   -EKG NSR; rightward axis;no ischemic change; no STEMI; rate 82   ECG Results              EKG 12-lead (In process)  Result time 11/04/22 14:54:24      In process by Interface, Lab In [facility] (11/04/22 14:54:24)                   Narrative:    Test Reason : I95.9,    Vent. Rate : 082 BPM     Atrial Rate : 082 BPM     P-R Int : 184 ms          QRS Dur : 082 ms      QT Int : 394 ms       P-R-T Axes : 076 096 054 degrees     QTc Int : 460 ms    Normal sinus rhythm  Rightward axis  Borderline Abnormal ECG  When compared with ECG of 21-OCT-2022 15:50,  No significant change was found    Referred By: [email or web address]   SELF           Confirmed By:                                    Imaging Results              X-Ray Chest AP Portable (Final result)  Result time 11/04/22 16:36:25      Final result by Eddy Laughlin MD (11/04/22 16:36:25)                   Impression:      1. No significant change in cardiopulmonary status.  2. Persistent left basilar pleural effusion with probable associated atelectasis or mild consolidation.  Minimal right basilar effusion also.      Electronically signed by: Eddy Laughlin  Date:    11/04/2022  Time:    16:36               Narrative:    EXAMINATION:  XR CHEST AP PORTABLE    CLINICAL HISTORY:  Hypotension, unspecified    TECHNIQUE:  Single frontal view of the chest was performed.    COMPARISON:  10/21/2022    FINDINGS:  Cardiac silhouette is within normal limits.  Left basilar effusion with probable mild associated atelectasis or consolidation at the left lung base.    Trace effusion on the right also.    Dual lumen central venous catheter is unchanged.  Postoperative changes of the cervical spine.  No evidence of pneumothorax.  No mass is detected.    Vascular stent in the left upper arm.    No significant change.                                    X-Rays:   Independently Interpreted Readings:   Chest X-Ray: No acute cardiopulmonary process; there is not appear to be findings of fluid overload in this end-stage renal disease patient; no focal consolidation; no pneumothorax per my interpretation; this was confirmed with a final radiology read   Medications   0.9%  NaCl infusion (0 mLs Intravenous Stopped 11/4/22 2706)     Medical Decision Making:   Clinical Tests:   Lab Tests: Reviewed and Ordered  Radiological Study: Ordered and Reviewed  ED Management:  - CXR demonstrates a persistent left basilar pleural effusion with probable associated atelectasis or mild consolidation.  Minimal right basilar effusion also per final radiology read  - EKG NSR; rightward axis;no ischemic change; no STEMI; rate 82  - workup essentially unremarkable other  than UDS positive for THC, opiates and benzos; patient administered approximately 600 cc of normal saline with significant improvement of her hypotension; no indication for admission after discussion with the [facility]; patient mentating without difficulty; she is able ambulate without feelings of weakness or lightheadedness or dizziness; will discharge patient home at this time as her symptoms are likely secondary to polypharmacy-she does not admit to taking more than her prescribed doses of Norco, Ativan, as well as consumption of THC gummies  - both daughter and patient are comfortable plan for discharge at this time; the patient was encouraged to follow up with her dialysis center as scheduled on Monday; the daughter and the patient were also given very strict return precautions for any new or worsening symptoms; they both verbalized understanding of this and expressed willingness to comply my recommendations  - No further intervention is indicated at this time after having taken into account the patient's history, physical exam findings, and empirical and objective data obtained during the patient's emergency department workup.   - The patient is at low risk for an emergent medical condition at this time, and I am of the belief that that it is safe to discharge the patient from the emergency department.   - The patient is instructed to follow up as outpatient as indicated on the discharge paperwork.    - I have discussed the specifics of the workup with the patient and the patient has verbalized understanding of the details of the workup, the diagnosis, the treatment plan, and the need for outpatient follow-up.    - Although the patient has no emergent etiology today this does not preclude the development of an emergent condition so, in addition, I have advised the patient that they can return to the ED and/or activate EMS at any time with worsening of their symptoms, change of their  symptoms, or with any other medical complaint.    - The patient remained comfortable and stable during their visit in the ED.    - Discharge and follow-up instructions discussed with the patient who expressed understanding and willingness to comply with my recommendations.  - Results of all emergency department tests  discussed thoroughly with patient; all patient questions answered; pt in agreement with plan  - Pt instructed to follow up with PCP in 2-3 days for recheck of today's complaints  - Pt given strict emergency department return precautions for any new or worsening of symptoms  - Pt discharged from the emergency department in stable condition, in no acute distress               ED Course as of 11/04/22 2336 Fri Nov 04, 2022 2217 Patient evaluated by the on-call [facility] hospitalist, Dr. De Paz, did not feel the patient warranted admission after evaluating her in the emergency department.  The patient's blood pressure has improved in her symptoms are likely secondary to polypharmacy.  Per daughter at bedside, this has happened before [LC]      ED Course User Index  [LC] Myron Sheehan MD                 Clinical Impression:   Final diagnoses:  [I95.9] Hypotension  [N18.6, Z99.2] ESRD (end stage renal disease) on dialysis  [I95.9] Hypotension, unspecified hypotension type (Primary)  [Z79.899] Polypharmacy  [Z91.89] At risk for polypharmacy  [E86.0] Dehydration        ED Disposition Condition    Discharge Stable          ED Prescriptions    None       Follow-up Information       Follow up With Specialties Details Why Contact Info    Kingston Verduzco MD Family Medicine Schedule an appointment as soon as possible for a visit   [address]  [address]  [facility] [address]  [phone]                   Myron Sheehan MD  11/04/22 2333       Myron Sheehan MD  11/04/22 2336

## 2022-11-04 NOTE — ED NOTES
Pt to ed with mother for eval of hypotension. Pt sent from dialysis. Pt had 15 min of her dialysis session then was sent to ed for eval. Pt given 400 cc IVF by dialysis PTA. Pt lethargic upon exam. Pts daughter states she takes ativan and hydrocodone. Unknown if patient took an extra dose of her medication. Patients BP 73/45 at present.. Dr Sheehan informed. Waiting for MD to see patient. Pt denies pain. Pt has no complaints. Pt responds to verbal stimuli. Pt has peritoneal dialysis access to R chest wall. Cardiac monitor in place. Will continue to monitor.

## 2022-11-04 NOTE — Clinical Note
Diagnosis: Hypotension [414865]   Future Attending Provider: HENRY MCCARTHY [[id number]]   Admitting Provider:: HENRY MCCARTHY [[id number]]   Special Needs:: Fall Risk [15]   Special Needs:: Dialysis - Hemodialysis [12]

## 2022-11-05 NOTE — DISCHARGE INSTRUCTIONS
Please go to dialysis as scheduled.    Return to the emergency department immediately for any new medications as needed.    Dear Ms. Sae,     Thank you for choosing [facility]! We appreciate you coming to us for your medical care. We hope you feel better soon! Please come back to [facility] for all of your future medical needs.    Our goal in the emergency department is to always give you outstanding care and exceptional service. That being said, you may receive a survey by mail or e-mail in the next week regarding your experience in our ED. We would greatly appreciate your completing and returning the survey. Your feedback provides us with a way to recognize our staff who give very good care and it helps us learn how to improve when your experience was below our aspiration of excellence.        Sincerely Yours,          Myron Sheehan MD    Board Certified Emergency Medicine Physician  Emergency Department  [facility]

## 2022-11-05 NOTE — PHARMACY MED REC
"  [facility]           Pharmacy  Admission Medication History     The home medication history was taken by Loren Meeks.      Medication history obtained from Medications listed below were obtained from: Patient/family    Based on information gathered for medication list, you may go to "Admission" then "Reconcile Home Medications" tabs to review and/or act upon those items.     The home medication list has been updated by the Pharmacy department.   Please read ALL comments highlighted in yellow.   Please address this information as you see fit.    Feel free to contact us if you have any questions or require assistance.        No current facility-administered medications on file prior to encounter.     Current Outpatient Medications on File Prior to Encounter   Medication Sig Dispense Refill    acetaminophen (TYLENOL) 325 MG tablet Take 1 tablet (325 mg total) by mouth every 6 (six) hours as needed for Pain. 10 tablet 0    albuterol (VENTOLIN HFA) 90 mcg/actuation inhaler Inhale 2 puffs into the lungs every 6 (six) hours as needed for Wheezing or Shortness of Breath. Rescue 18 g 3    albuterol-ipratropium (DUO-NEB) 2.5 mg-0.5 mg/3 mL nebulizer solution Take 3 mLs by nebulization every 6 (six) hours as needed for Shortness of Breath. Rescue 180 mL 11    busPIRone (BUSPAR) 10 MG tablet Take 1 tablet (10 mg total) by mouth 2 (two) times daily. 60 tablet 11    diclofenac sodium (VOLTAREN) 1 % Gel Apply 2 g topically 3 (three) times daily.      diphenhydrAMINE (BENADRYL) 25 mg capsule Take 25 mg by mouth every 6 (six) hours as needed for Itching.      DULoxetine (CYMBALTA) 30 MG capsule Take 1 capsule (30 mg total) by mouth once daily. 30 capsule 3    fluticasone-umeclidin-vilanter (TRELEGY ELLIPTA) 200-62.5-25 mcg inhaler Inhale 1 puff into the lungs once daily. 60 each 11    HYDROcodone-acetaminophen (NORCO)  mg per tablet Take 1 tablet by mouth 3 (three) times daily as needed for Pain. 90 " tablet 0    LORazepam (ATIVAN) 0.5 MG tablet Take 1 tablet (0.5 mg total) by mouth every 12 (twelve) hours as needed for Anxiety. 30 tablet 1    metoprolol tartrate (LOPRESSOR) 25 MG tablet Take 1 tablet (25 mg total) by mouth 2 (two) times daily. 60 tablet 11    allopurinoL (ZYLOPRIM) 100 MG tablet Take 1 tablet (100 mg total) by mouth on Tuesday, Thursday, Saturday, and Sunday. 30 tablet 0    amLODIPine (NORVASC) 5 MG tablet Take 5 mg by mouth once daily.      atorvastatin (LIPITOR) 80 MG tablet Take 80 mg by mouth once daily.      dexAMETHasone (DECADRON) 4 mg/mL injection       epoetin hemant-epbx (RETACRIT) 10,000 unit/mL imjection Inject 0.5 mLs (5,000 Units total) into the skin every Mon, Wed, Fri.      heparin, porcine, PF, (HEPARIN FLUSH 100 UNITS/ML) 100 unit/mL Syrg       megestroL (MEGACE) 40 MG Tab Take 40 mg by mouth once daily.      ondansetron 4 mg/2 mL Soln       sevelamer carbonate (RENVELA) 800 mg Tab Take 1 tablet (800 mg total) by mouth after meals as needed. 90 tablet 11       Please address this information as you see fit.  Feel free to contact us if you have any questions or require assistance.    Loren Meeks  [phone]                .

## 2022-11-10 LAB
BACTERIA BLD CULT: NORMAL
BACTERIA BLD CULT: NORMAL

## 2022-11-11 DIAGNOSIS — N39.0 E-COLI UTI: Primary | ICD-10-CM

## 2022-11-11 DIAGNOSIS — B96.20 E-COLI UTI: Primary | ICD-10-CM

## 2022-11-11 RX ORDER — CIPROFLOXACIN 500 MG/1
500 TABLET ORAL 2 TIMES DAILY
Qty: 14 TABLET | Refills: 0 | Status: SHIPPED | OUTPATIENT
Start: 2022-11-11 | End: 2022-11-15 | Stop reason: SINTOL

## 2022-11-15 DIAGNOSIS — B96.20 E-COLI UTI: Primary | ICD-10-CM

## 2022-11-15 DIAGNOSIS — N39.0 E-COLI UTI: Primary | ICD-10-CM

## 2022-11-15 RX ORDER — NITROFURANTOIN 25; 75 MG/1; MG/1
100 CAPSULE ORAL 2 TIMES DAILY
Qty: 10 CAPSULE | Refills: 0 | Status: SHIPPED | OUTPATIENT
Start: 2022-11-15 | End: 2022-11-20

## 2022-11-22 ENCOUNTER — PATIENT MESSAGE (OUTPATIENT)
Dept: PHYSICAL MEDICINE AND REHAB | Facility: CLINIC | Age: 77
End: 2022-11-22
Payer: MEDICARE

## 2022-11-22 NOTE — TELEPHONE ENCOUNTER
Last Rx refill-----10/24/22  Last office visit--08/30/22  Next office visit--  Pharmacy--------- [facility]

## 2022-11-23 RX ORDER — HYDROCODONE BITARTRATE AND ACETAMINOPHEN 10; 325 MG/1; MG/1
1 TABLET ORAL 3 TIMES DAILY PRN
Qty: 90 TABLET | Refills: 0 | Status: ON HOLD | OUTPATIENT
Start: 2022-12-02 | End: 2022-12-12 | Stop reason: HOSPADM

## 2022-11-24 ENCOUNTER — HOSPITAL ENCOUNTER (EMERGENCY)
Facility: HOSPITAL | Age: 77
Discharge: HOME | End: 2022-11-24
Attending: EMERGENCY MEDICINE | Admitting: HOSPITALIST
Payer: MEDICARE

## 2022-11-24 VITALS
RESPIRATION RATE: 25 BRPM | TEMPERATURE: 98 F | SYSTOLIC BLOOD PRESSURE: 101 MMHG | OXYGEN SATURATION: 92 % | HEART RATE: 87 BPM | DIASTOLIC BLOOD PRESSURE: 50 MMHG

## 2022-11-24 DIAGNOSIS — J44.1 COPD EXACERBATION: Primary | ICD-10-CM

## 2022-11-24 DIAGNOSIS — R06.02 SHORTNESS OF BREATH: ICD-10-CM

## 2022-11-24 LAB
ALBUMIN SERPL BCP-MCNC: 3.1 G/DL (ref 3.5–5.2)
ALLENS TEST: ABNORMAL
ALP SERPL-CCNC: 292 U/L (ref 55–135)
ALT SERPL W/O P-5'-P-CCNC: 9 U/L (ref 10–44)
ANION GAP SERPL CALC-SCNC: 12 MMOL/L (ref 8–16)
AST SERPL-CCNC: 22 U/L (ref 10–40)
BASOPHILS # BLD AUTO: 0.09 K/UL (ref 0–0.2)
BASOPHILS NFR BLD: 0.6 % (ref 0–1.9)
BILIRUB SERPL-MCNC: 1.1 MG/DL (ref 0.1–1)
BUN SERPL-MCNC: 21 MG/DL (ref 8–23)
CALCIUM SERPL-MCNC: 9.6 MG/DL (ref 8.7–10.5)
CHLORIDE SERPL-SCNC: 102 MMOL/L (ref 95–110)
CO2 SERPL-SCNC: 21 MMOL/L (ref 23–29)
CREAT SERPL-MCNC: 2.3 MG/DL (ref 0.5–1.4)
CTP QC/QA: YES
CTP QC/QA: YES
DELSYS: ABNORMAL
DIFFERENTIAL METHOD: ABNORMAL
EOSINOPHIL # BLD AUTO: 0.6 K/UL (ref 0–0.5)
EOSINOPHIL NFR BLD: 4.1 % (ref 0–8)
EP: 5
ERYTHROCYTE [DISTWIDTH] IN BLOOD BY AUTOMATED COUNT: 13.9 % (ref 11.5–14.5)
ERYTHROCYTE [SEDIMENTATION RATE] IN BLOOD BY WESTERGREN METHOD: 12 MM/H
EST. GFR  (NO RACE VARIABLE): 21 ML/MIN/1.73 M^2
FIO2: 40
GLUCOSE SERPL-MCNC: 107 MG/DL (ref 70–110)
HCO3 UR-SCNC: 23.2 MMOL/L (ref 24–28)
HCT VFR BLD AUTO: 31.7 % (ref 37–48.5)
HGB BLD-MCNC: 9.7 G/DL (ref 12–16)
IMM GRANULOCYTES # BLD AUTO: 0.05 K/UL (ref 0–0.04)
IMM GRANULOCYTES NFR BLD AUTO: 0.3 % (ref 0–0.5)
IP: 10
LYMPHOCYTES # BLD AUTO: 0.4 K/UL (ref 1–4.8)
LYMPHOCYTES NFR BLD: 3.1 % (ref 18–48)
MAGNESIUM SERPL-MCNC: 1.7 MG/DL (ref 1.6–2.6)
MCH RBC QN AUTO: 31.2 PG (ref 27–31)
MCHC RBC AUTO-ENTMCNC: 30.6 G/DL (ref 32–36)
MCV RBC AUTO: 102 FL (ref 82–98)
MIN VOL: 13.9
MODE: ABNORMAL
MONOCYTES # BLD AUTO: 0.8 K/UL (ref 0.3–1)
MONOCYTES NFR BLD: 5.7 % (ref 4–15)
NEUTROPHILS # BLD AUTO: 12.3 K/UL (ref 1.8–7.7)
NEUTROPHILS NFR BLD: 86.2 % (ref 38–73)
NRBC BLD-RTO: 0 /100 WBC
PCO2 BLDA: 38.6 MMHG (ref 35–45)
PH SMN: 7.39 [PH] (ref 7.35–7.45)
PLATELET # BLD AUTO: 243 K/UL (ref 150–450)
PMV BLD AUTO: 9.7 FL (ref 9.2–12.9)
PO2 BLDA: 74 MMHG (ref 80–100)
POC BE: -2 MMOL/L
POC MOLECULAR INFLUENZA A AGN: NEGATIVE
POC MOLECULAR INFLUENZA B AGN: NEGATIVE
POC SATURATED O2: 95 % (ref 95–100)
POC TCO2: 24 MMOL/L (ref 23–27)
POTASSIUM SERPL-SCNC: 4.4 MMOL/L (ref 3.5–5.1)
PROT SERPL-MCNC: 7.8 G/DL (ref 6–8.4)
RBC # BLD AUTO: 3.11 M/UL (ref 4–5.4)
SAMPLE: ABNORMAL
SARS-COV-2 RDRP RESP QL NAA+PROBE: NEGATIVE
SITE: ABNORMAL
SODIUM SERPL-SCNC: 135 MMOL/L (ref 136–145)
SP02: 98
SPONT RATE: 27
TROPONIN I SERPL DL<=0.01 NG/ML-MCNC: 0.01 NG/ML (ref 0–0.03)
WBC # BLD AUTO: 14.3 K/UL (ref 3.9–12.7)

## 2022-11-24 PROCEDURE — 99285 EMERGENCY DEPT VISIT HI MDM: CPT | Mod: 25

## 2022-11-24 PROCEDURE — 94660 CPAP INITIATION&MGMT: CPT

## 2022-11-24 PROCEDURE — 25000003 PHARM REV CODE 250: Performed by: EMERGENCY MEDICINE

## 2022-11-24 PROCEDURE — 83735 ASSAY OF MAGNESIUM: CPT | Performed by: EMERGENCY MEDICINE

## 2022-11-24 PROCEDURE — 80053 COMPREHEN METABOLIC PANEL: CPT | Performed by: EMERGENCY MEDICINE

## 2022-11-24 PROCEDURE — 82803 BLOOD GASES ANY COMBINATION: CPT

## 2022-11-24 PROCEDURE — 25000242 PHARM REV CODE 250 ALT 637 W/ HCPCS: Performed by: EMERGENCY MEDICINE

## 2022-11-24 PROCEDURE — 27000190 HC CPAP FULL FACE MASK W/VALVE

## 2022-11-24 PROCEDURE — 99900035 HC TECH TIME PER 15 MIN (STAT)

## 2022-11-24 PROCEDURE — 84484 ASSAY OF TROPONIN QUANT: CPT | Performed by: EMERGENCY MEDICINE

## 2022-11-24 PROCEDURE — 96374 THER/PROPH/DIAG INJ IV PUSH: CPT

## 2022-11-24 PROCEDURE — 94644 CONT INHLJ TX 1ST HOUR: CPT

## 2022-11-24 PROCEDURE — 85025 COMPLETE CBC W/AUTO DIFF WBC: CPT | Performed by: EMERGENCY MEDICINE

## 2022-11-24 PROCEDURE — 36600 WITHDRAWAL OF ARTERIAL BLOOD: CPT

## 2022-11-24 PROCEDURE — 94761 N-INVAS EAR/PLS OXIMETRY MLT: CPT

## 2022-11-24 PROCEDURE — 87635 SARS-COV-2 COVID-19 AMP PRB: CPT | Performed by: EMERGENCY MEDICINE

## 2022-11-24 PROCEDURE — 27000221 HC OXYGEN, UP TO 24 HOURS

## 2022-11-24 PROCEDURE — 63700000 PHARM REV CODE 250 ALT 637 W/O HCPCS: Performed by: EMERGENCY MEDICINE

## 2022-11-24 PROCEDURE — 87502 INFLUENZA DNA AMP PROBE: CPT

## 2022-11-24 PROCEDURE — 63600175 PHARM REV CODE 636 W HCPCS: Performed by: EMERGENCY MEDICINE

## 2022-11-24 RX ORDER — AZITHROMYCIN 250 MG/1
250 TABLET, FILM COATED ORAL DAILY
Qty: 4 TABLET | Refills: 0 | Status: SHIPPED | OUTPATIENT
Start: 2022-11-24 | End: 2022-11-29

## 2022-11-24 RX ORDER — ALBUTEROL SULFATE 2.5 MG/.5ML
10 SOLUTION RESPIRATORY (INHALATION)
Status: COMPLETED | OUTPATIENT
Start: 2022-11-24 | End: 2022-11-24

## 2022-11-24 RX ORDER — ACETAMINOPHEN 325 MG/1
650 TABLET ORAL
Status: COMPLETED | OUTPATIENT
Start: 2022-11-24 | End: 2022-11-24

## 2022-11-24 RX ORDER — AZITHROMYCIN 250 MG/1
500 TABLET, FILM COATED ORAL
Status: COMPLETED | OUTPATIENT
Start: 2022-11-24 | End: 2022-11-24

## 2022-11-24 RX ORDER — PREDNISONE 50 MG/1
50 TABLET ORAL DAILY
Qty: 4 TABLET | Refills: 0 | Status: SHIPPED | OUTPATIENT
Start: 2022-11-24 | End: 2022-11-29

## 2022-11-24 RX ORDER — IPRATROPIUM BROMIDE 0.5 MG/2.5ML
0.5 SOLUTION RESPIRATORY (INHALATION)
Status: COMPLETED | OUTPATIENT
Start: 2022-11-24 | End: 2022-11-24

## 2022-11-24 RX ADMIN — ALBUTEROL SULFATE 10 MG: 2.5 SOLUTION RESPIRATORY (INHALATION) at 04:11

## 2022-11-24 RX ADMIN — METHYLPREDNISOLONE SODIUM SUCCINATE 80 MG: 40 INJECTION, POWDER, FOR SOLUTION INTRAMUSCULAR; INTRAVENOUS at 05:11

## 2022-11-24 RX ADMIN — IPRATROPIUM BROMIDE 0.5 MG: 0.5 SOLUTION RESPIRATORY (INHALATION) at 04:11

## 2022-11-24 RX ADMIN — AZITHROMYCIN MONOHYDRATE 500 MG: 250 TABLET ORAL at 05:11

## 2022-11-24 RX ADMIN — ACETAMINOPHEN 650 MG: 325 TABLET ORAL at 05:11

## 2022-11-24 NOTE — ED NOTES
Patient presents to ED from home via EMS with c/o COPD exacerbation. O2 sats in the 80s on RA. Patient placed on 10 L HFNC en route with EMS. Patient is awake and alert. Respiratory called to bedside. Patient placed on bibpap. O2 sats 96%.

## 2022-11-24 NOTE — ED PROVIDER NOTES
Encounter Date: 11/24/2022       History     Chief Complaint   Patient presents with    Shortness of Breath     Brought to Ed from home for COPD exacerbation. Pt arrived on NRB.      77-year-old female with history of ESRD on HD and COPD brought to the emergency department by EMS for evaluation of shortness of breath.   provides much of the history due to the patient's shortness of breath.  Onset a few days ago but significantly worse today.  Minimal improvement with home neb and breathing treatments.  Patient does have mild cough similar to prior exacerbations, unchanged from baseline.  Denies any fever.  Notes some chest tightness with her shortness of breath.  No exertional or positional component.  Denies any vomiting or diarrhea.  No other symptoms reported at this time.    No meds given en route by EMS.    Review of patient's allergies indicates:   Allergen Reactions    Aspirin      Other reaction(s): hx of ulcers    Tetracycline Swelling     Other reaction(s): Swelling    Penicillins Rash     Other reaction(s): Hives  Other reaction(s): Rash  Other reaction(s): Rash  Other reaction(s): Hives     Past Medical History:   Diagnosis Date    Acute congestive heart failure 02/10/2020    Anemia     Bilateral renal cysts     Cataract     Chronic LBP 7/26/2012    Chronic pain     CKD (chronic kidney disease), stage IV     Colon polyp 2013    COPD (chronic obstructive pulmonary disease)     Dehydration     Encounter for blood transfusion     HTN (hypertension)     Lumbar spondylosis     Melanoma     of the lip    Metabolic bone disease     Migraines, neuralgic     Osteoporosis     Primary osteoarthritis of both knees     s/p Rt TKA    Pulmonary embolism with infarction     Seizures 1972    x1 only    Subdeltoid bursitis, L>R. 9/27/2012    Ulcer     Vitamin D deficiency disease      Past Surgical History:   Procedure Laterality Date    BLADDER SUSPENSION      CATARACT EXTRACTION  11/18/13    left eye    CERVICAL  LAMINECTOMY      x3, fusion x1    COLONOSCOPY  2009    DECLOTTING OF ARTERIOVENOUS GRAFT Left 1/3/2022    Procedure: [id number];  Surgeon: Lindsey Louie MD;  Location: [facility] OR;  Service: Vascular;  Laterality: Left;    DECLOTTING OF ARTERIOVENOUS GRAFT Left 2/8/2022    Procedure: [id number];  Surgeon: Lindsey Louie MD;  Location: [facility] OR;  Service: Vascular;  Laterality: Left;    DECLOTTING OF ARTERIOVENOUS GRAFT Left 4/8/2022    Procedure: [id number];  Surgeon: Lindsey Louie MD;  Location: [facility] OR;  Service: Vascular;  Laterality: Left;    FISTULOGRAM N/A 2/27/2020    Procedure: Fistulogram;  Surgeon: Noe Benitez MD;  Location: [facility] CATH LAB/EP;  Service: Cardiology;  Laterality: N/A;    HYSTERECTOMY      JOINT REPLACEMENT  2001    total right knee     LUMBAR LAMINECTOMY      x 3, fusion x1    OOPHORECTOMY      PERIPHERAL ANGIOGRAPHY N/A 3/9/2020    Procedure: Peripheral angiography;  Surgeon: Jacinto Henriquez MD;  Location: [facility] CATH LAB/EP;  Service: Cardiology;  Laterality: N/A;    PLACEMENT OF ARTERIOVENOUS GRAFT Left 1/21/2020    Procedure: INSERTION, GRAFT, ARTERIOVENOUS;  Surgeon: Lindsey Louie MD;  Location: [facility] OR;  Service: General;  Laterality: Left;    PLACEMENT OF ARTERIOVENOUS GRAFT Right 7/22/2022    Procedure: INSERTION, GRAFT, ARTERIOVENOUS;  Surgeon: Lindsey Louie MD;  Location: [facility] OR;  Service: General;  Laterality: Right;    PLACEMENT OF DUAL-LUMEN VASCULAR CATHETER Right 4/16/2022    Procedure: INSERTION-CATHETER-RICKI;  Surgeon: Lindsey Loiue MD;  Location: [facility] OR;  Service: General;  Laterality: Right;    THROMBECTOMY Left 2/3/2020    Procedure: THROMBECTOMY;  Surgeon: Lindsey Louie MD;  Location: [facility] OR;  Service: General;  Laterality: Left;    THROMBECTOMY  3/9/2020    Procedure: Thrombectomy;  Surgeon: Jacinto Henriquez MD;  Location: [facility] CATH LAB/EP;  Service: Cardiology;;    THROMBECTOMY Left  4/7/2022    Procedure: THROMBECTOMY;  Surgeon: Lindsey Louie MD;  Location: [facility] OR;  Service: General;  Laterality: Left;     Family History   Problem Relation Age of Onset    Arthritis Mother     Stroke Mother     Hypertension Father     Cancer Father     Cataracts Father     Diabetes Maternal Aunt     Hypertension Maternal Grandfather     Heart disease Maternal Grandfather     Heart attack Maternal Grandfather     Cataracts Sister     Glaucoma Cousin      Social History     Tobacco Use    Smoking status: Former     Types: Cigarettes    Smokeless tobacco: Former     Quit date: 2/3/2015   Substance Use Topics    Alcohol use: Not Currently     Comment: Rare    Drug use: No     Review of Systems   Unable to perform ROS: Severe respiratory distress     Physical Exam     Initial Vitals [11/24/22 1601]   BP Pulse Resp Temp SpO2   (!) 147/76 (!) 45 (!) 26 98.3 °F (36.8 °C) (!) 80 %      MAP       --         Physical Exam    Nursing note and vitals reviewed.  Constitutional: She appears well-developed and well-nourished.   HENT:   Head: Normocephalic and atraumatic.   Eyes: Conjunctivae and EOM are normal. Pupils are equal, round, and reactive to light.   Neck: Neck supple. No tracheal deviation present.   Normal range of motion.  Cardiovascular:  Normal rate and intact distal pulses.           Pulmonary/Chest: She is in respiratory distress.   Abdominal: Abdomen is soft. She exhibits no distension.   Musculoskeletal:         General: No tenderness or edema. Normal range of motion.      Cervical back: Normal range of motion and neck supple.     Neurological: She is alert and oriented to person, place, and time. She has normal strength. No cranial nerve deficit. GCS score is 15. GCS eye subscore is 4. GCS verbal subscore is 5. GCS motor subscore is 6.   Skin: Skin is warm and dry.       ED Course   Procedures  Labs Reviewed   CBC W/ AUTO DIFFERENTIAL - Abnormal; Notable for the following components:       Result  Value    WBC 14.30 (*)     RBC 3.11 (*)     Hemoglobin 9.7 (*)     Hematocrit 31.7 (*)      (*)     MCH 31.2 (*)     MCHC 30.6 (*)     Gran # (ANC) 12.3 (*)     Immature Grans (Abs) 0.05 (*)     Lymph # 0.4 (*)     Eos # 0.6 (*)     Gran % 86.2 (*)     Lymph % 3.1 (*)     All other components within normal limits   COMPREHENSIVE METABOLIC PANEL - Abnormal; Notable for the following components:    Sodium 135 (*)     CO2 21 (*)     Creatinine 2.3 (*)     Albumin 3.1 (*)     Total Bilirubin 1.1 (*)     Alkaline Phosphatase 292 (*)     ALT 9 (*)     eGFR 21 (*)     All other components within normal limits   ISTAT PROCEDURE - Abnormal; Notable for the following components:    POC PO2 74 (*)     POC HCO3 23.2 (*)     All other components within normal limits   TROPONIN I   MAGNESIUM   POCT INFLUENZA A/B MOLECULAR   SARS-COV-2 RDRP GENE     EKG Readings: (Independently Interpreted)   Initial Reading: No STEMI. Previous EKG: Compared with most recent EKG Previous EKG Date: 10/21/2022 (Minimal change). Rhythm: Sinus Tachycardia. Heart Rate: 96. Ectopy: No Ectopy. Conduction: Normal. ST Segments: Normal ST Segments. Axis: Right Axis Deviation.         X-Rays:   Independently Interpreted Readings:   Other Readings:  Imaging interpreted by radiologist and visualized by me:   Imaging Results              X-Ray Chest AP Portable (Final result)  Result time 11/24/22 17:07:35      Final result by Mike Sheth DO (11/24/22 17:07:35)                   Impression:      Slightly worsening bibasilar airspace opacities which may reflect atelectasis, aspiration or pneumonia.    Small pleural effusions.      Electronically signed by: Mike Sheth  Date:    11/24/2022  Time:    17:07               Narrative:    EXAMINATION:  XR CHEST AP PORTABLE    CLINICAL HISTORY:  Shortness of breath;    TECHNIQUE:  Single frontal view of the chest was performed.    COMPARISON:  11/04/2022.    FINDINGS:  There is a dual lumen right internal  jugular central venous catheter, unchanged in position from prior.  The lungs are hyperexpanded.  There are small left greater than right pleural effusions.  There are mildly worsening bibasilar airspace opacities compatible with atelectasis, aspiration, or multifocal pneumonia.  The cardiac silhouette is unremarkable.  There are calcifications of the aortic arch.  There is no pneumothorax.  The visualized osseous structures demonstrate degenerative changes and osteopenia.  There is a left-sided upper extremity vascular stent.  Partially imaged cervical spine hardware noted.                                      Medications   methylPREDNISolone sodium succinate injection 80 mg (80 mg Intravenous Given 11/24/22 1731)   albuterol sulfate nebulizer solution 10 mg (10 mg Nebulization Given 11/24/22 1644)   ipratropium 0.02 % nebulizer solution 0.5 mg (0.5 mg Nebulization Given 11/24/22 1644)   acetaminophen tablet 650 mg (650 mg Oral Given 11/24/22 1750)   azithromycin tablet 500 mg (500 mg Oral Given 11/24/22 1750)     Medical Decision Making:   Initial Assessment:   77-year-old female brought to the emergency department for evaluation of shortness of breath  Differential Diagnosis:   ACS, dissection, pneumonia, pneumothorax, CHF, COPD, COVID, influenza  Independently Interpreted Test(s):   I have ordered and independently interpreted X-rays - see prior notes.  I have ordered and independently interpreted EKG Reading(s) - see prior notes  Clinical Tests:   Lab Tests: Reviewed       <> Summary of Lab: Leukocytosis, otherwise benign  ED Management:  Patient given Solu-Medrol, duo nebs, azithromycin.  Also given some Tylenol.  Able to be weaned off of the BiPAP and is now on her baseline 2-3 L nasal cannula with stable vital signs.  Offered admission versus discharge, patient prefers discharge, states she wants to go home.  We discussed disposition including discharge with prescriptions for prednisone and azithromycin to  begin tomorrow, home management techniques, follow up with dialysis as scheduled tomorrow, strict return precautions given.  Vital signs stable, patient family comfortable with discharge at this time.                         Clinical Impression:   Final diagnoses:  [R06.02] Shortness of breath  [J44.1] COPD exacerbation (Primary)        ED Disposition Condition    Discharge Stable          ED Prescriptions       Medication Sig Dispense Start Date End Date Auth. Provider    azithromycin (Z-MERON) 250 MG tablet Take 1 tablet (250 mg total) by mouth once daily. for 4 days 4 tablet 11/24/2022 11/28/2022 Prashant Crocker MD    predniSONE (DELTASONE) 50 MG Tab Take 1 tablet (50 mg total) by mouth once daily. for 4 days 4 tablet 11/24/2022 11/28/2022 Prashant Crocker MD          Follow-up Information       Follow up With Specialties Details Why Contact Info    Kingston Verduzco MD Family Medicine Schedule an appointment as soon as possible for a visit   [address]  [address]  [facility] [address]  [phone]               Prashant Crocker MD  11/24/22 1819       Prashant Crocker MD  11/24/22 9507

## 2022-11-25 ENCOUNTER — DOCUMENT SCAN (OUTPATIENT)
Dept: HOME HEALTH SERVICES | Facility: HOSPITAL | Age: 77
End: 2022-11-25
Payer: MEDICARE

## 2022-11-25 NOTE — ED NOTES
Dr. Fuentes @ bedside. Patient weaned off bipap. O2 sats 95% on 3L NC. Patient awake and alert, sitting up in bed, speaking with family.

## 2022-11-25 NOTE — DISCHARGE INSTRUCTIONS
Please call your primary care physician tomorrow for a follow up appointment for reevaluation. Please return if you have any new or worsening symptoms.

## 2022-11-30 RX ORDER — LORAZEPAM 0.5 MG/1
0.5 TABLET ORAL EVERY 12 HOURS PRN
Qty: 30 TABLET | Refills: 0 | Status: CANCELLED | OUTPATIENT
Start: 2022-11-30 | End: 2022-12-30

## 2022-11-30 NOTE — TELEPHONE ENCOUNTER
LORazepam (ATIVAN) 0.5 MG tablet         The original prescription was reordered on 11/1/2022 by Katharine Urbina NP. Renewing this prescription may not be appropriate.    Sig: Take 1 tablet (0.5 mg total) by mouth every 12 (twelve) hours as needed for Anxiety.    Disp:  30 tablet    Refills:  0    Start: 11/30/2022 - 12/30/2022    Class: Normal    Last ordered: 1 month ago by Jennifer Bowles MD Last dispensed: 10/20/2022    Rx #: [id number]    Anxiolytics Refill Protocol Failed 11/30/2022 03:04 PM   Protocol Details  Patient seen within 3 months    Med not refilled within 4 weeks    Patient not pregnant   Anticonvulsants Protocol Failed   Protocol Details  Visit with Authorizing provider in past 9 months or upcoming 90 days    No active pregnancy on record      To be filled at: [facility]

## 2022-12-01 DIAGNOSIS — F41.9 ANXIETY: ICD-10-CM

## 2022-12-01 RX ORDER — LORAZEPAM 0.5 MG/1
0.5 TABLET ORAL EVERY 12 HOURS PRN
Qty: 30 TABLET | Refills: 1 | Status: ON HOLD | OUTPATIENT
Start: 2022-12-01 | End: 2022-12-12 | Stop reason: HOSPADM

## 2022-12-06 ENCOUNTER — HOSPITAL ENCOUNTER (OUTPATIENT)
Dept: CARDIOLOGY | Facility: HOSPITAL | Age: 77
Discharge: HOME | End: 2022-12-06
Attending: INTERNAL MEDICINE
Payer: MEDICARE

## 2022-12-06 ENCOUNTER — OFFICE VISIT (OUTPATIENT)
Dept: CARDIOLOGY | Facility: CLINIC | Age: 77
End: 2022-12-06
Payer: MEDICARE

## 2022-12-06 VITALS
SYSTOLIC BLOOD PRESSURE: 132 MMHG | HEIGHT: 62 IN | OXYGEN SATURATION: 97 % | BODY MASS INDEX: 16.55 KG/M2 | WEIGHT: 89.94 LBS | DIASTOLIC BLOOD PRESSURE: 74 MMHG

## 2022-12-06 DIAGNOSIS — J96.21 ACUTE ON CHRONIC RESPIRATORY FAILURE WITH HYPOXIA: ICD-10-CM

## 2022-12-06 DIAGNOSIS — I10 ESSENTIAL HYPERTENSION: Chronic | ICD-10-CM

## 2022-12-06 DIAGNOSIS — N18.6 ESRD (END STAGE RENAL DISEASE) ON DIALYSIS: Chronic | ICD-10-CM

## 2022-12-06 DIAGNOSIS — Z99.2 ESRD (END STAGE RENAL DISEASE) ON DIALYSIS: Chronic | ICD-10-CM

## 2022-12-06 DIAGNOSIS — R00.0 TACHYCARDIA: ICD-10-CM

## 2022-12-06 DIAGNOSIS — I51.9 DIASTOLIC DYSFUNCTION, LEFT VENTRICLE: Primary | ICD-10-CM

## 2022-12-06 DIAGNOSIS — I95.9 HYPOTENSION, UNSPECIFIED HYPOTENSION TYPE: ICD-10-CM

## 2022-12-06 DIAGNOSIS — J44.1 COPD EXACERBATION: ICD-10-CM

## 2022-12-06 PROCEDURE — 1160F PR REVIEW ALL MEDS BY PRESCRIBER/CLIN PHARMACIST DOCUMENTED: ICD-10-PCS | Mod: CPTII,S$GLB,, | Performed by: INTERNAL MEDICINE

## 2022-12-06 PROCEDURE — 99214 PR OFFICE/OUTPT VISIT, EST, LEVL IV, 30-39 MIN: ICD-10-PCS | Mod: S$GLB,,, | Performed by: INTERNAL MEDICINE

## 2022-12-06 PROCEDURE — 99214 OFFICE O/P EST MOD 30 MIN: CPT | Mod: S$GLB,,, | Performed by: INTERNAL MEDICINE

## 2022-12-06 PROCEDURE — 93227 XTRNL ECG REC<48 HR R&I: CPT | Mod: ,,, | Performed by: INTERNAL MEDICINE

## 2022-12-06 PROCEDURE — 1159F MED LIST DOCD IN RCRD: CPT | Mod: CPTII,S$GLB,, | Performed by: INTERNAL MEDICINE

## 2022-12-06 PROCEDURE — 3075F SYST BP GE 130 - 139MM HG: CPT | Mod: CPTII,S$GLB,, | Performed by: INTERNAL MEDICINE

## 2022-12-06 PROCEDURE — 3078F PR MOST RECENT DIASTOLIC BLOOD PRESSURE < 80 MM HG: ICD-10-PCS | Mod: CPTII,S$GLB,, | Performed by: INTERNAL MEDICINE

## 2022-12-06 PROCEDURE — 93000 ELECTROCARDIOGRAM COMPLETE: CPT | Mod: S$GLB,,, | Performed by: INTERNAL MEDICINE

## 2022-12-06 PROCEDURE — 99999 PR PBB SHADOW E&M-EST. PATIENT-LVL IV: CPT | Mod: PBBFAC,,, | Performed by: INTERNAL MEDICINE

## 2022-12-06 PROCEDURE — 3075F PR MOST RECENT SYSTOLIC BLOOD PRESS GE 130-139MM HG: ICD-10-PCS | Mod: CPTII,S$GLB,, | Performed by: INTERNAL MEDICINE

## 2022-12-06 PROCEDURE — 1159F PR MEDICATION LIST DOCUMENTED IN MEDICAL RECORD: ICD-10-PCS | Mod: CPTII,S$GLB,, | Performed by: INTERNAL MEDICINE

## 2022-12-06 PROCEDURE — 99999 PR PBB SHADOW E&M-EST. PATIENT-LVL IV: ICD-10-PCS | Mod: PBBFAC,,, | Performed by: INTERNAL MEDICINE

## 2022-12-06 PROCEDURE — 93000 EKG 12-LEAD: ICD-10-PCS | Mod: S$GLB,,, | Performed by: INTERNAL MEDICINE

## 2022-12-06 PROCEDURE — 1160F RVW MEDS BY RX/DR IN RCRD: CPT | Mod: CPTII,S$GLB,, | Performed by: INTERNAL MEDICINE

## 2022-12-06 PROCEDURE — 3288F PR FALLS RISK ASSESSMENT DOCUMENTED: ICD-10-PCS | Mod: CPTII,S$GLB,, | Performed by: INTERNAL MEDICINE

## 2022-12-06 PROCEDURE — 3078F DIAST BP <80 MM HG: CPT | Mod: CPTII,S$GLB,, | Performed by: INTERNAL MEDICINE

## 2022-12-06 PROCEDURE — 1101F PR PT FALLS ASSESS DOC 0-1 FALLS W/OUT INJ PAST YR: ICD-10-PCS | Mod: CPTII,S$GLB,, | Performed by: INTERNAL MEDICINE

## 2022-12-06 PROCEDURE — 93227 HOLTER MONITOR - 48 HOUR (CUPID ONLY): ICD-10-PCS | Mod: ,,, | Performed by: INTERNAL MEDICINE

## 2022-12-06 PROCEDURE — 1126F AMNT PAIN NOTED NONE PRSNT: CPT | Mod: CPTII,S$GLB,, | Performed by: INTERNAL MEDICINE

## 2022-12-06 PROCEDURE — 93226 XTRNL ECG REC<48 HR SCAN A/R: CPT

## 2022-12-06 PROCEDURE — 1126F PR PAIN SEVERITY QUANTIFIED, NO PAIN PRESENT: ICD-10-PCS | Mod: CPTII,S$GLB,, | Performed by: INTERNAL MEDICINE

## 2022-12-06 PROCEDURE — 1101F PT FALLS ASSESS-DOCD LE1/YR: CPT | Mod: CPTII,S$GLB,, | Performed by: INTERNAL MEDICINE

## 2022-12-06 PROCEDURE — 3288F FALL RISK ASSESSMENT DOCD: CPT | Mod: CPTII,S$GLB,, | Performed by: INTERNAL MEDICINE

## 2022-12-06 NOTE — PROGRESS NOTES
Subjective:    Patient ID:  Katie Quevedo is a 77 y.o. female who presents for evaluation of Palpitations      HPI    76 y/o female who presents for f/u. She has a hx of HTn, HFpEF, COPD, ESRD on HD. Initially seen for eval for palps. Never obtained Holter and no BP log available. She has been to ED multiple times since with mostly COPD exacerbations and 1 episode of hypotension during HD. Continues to have SOB/BERTRAND and palps. Denies CP, orthopnea, PND, syncope. Compliant with meds. 2DE 10/2022 with:  The left ventricle is normal in size with concentric remodeling and hyperdynamic systolic function.  The estimated ejection fraction is 75%.  Normal left ventricular diastolic function.  The estimated PA systolic pressure is 18 mmHg.  Normal right ventricular size with normal right ventricular systolic function.  Normal central venous pressure (3 mmHg).    Review of Systems   Constitutional: Positive for malaise/fatigue.   HENT:  Negative for congestion.    Eyes:  Negative for blurred vision and double vision.   Cardiovascular:  Positive for dyspnea on exertion and palpitations. Negative for chest pain, claudication, cyanosis, irregular heartbeat, leg swelling, near-syncope, orthopnea, paroxysmal nocturnal dyspnea and syncope.   Respiratory:  Positive for shortness of breath.    Endocrine: Negative for polyuria.   Hematologic/Lymphatic: Negative for bleeding problem.   Skin:  Negative for itching and rash.   Musculoskeletal:  Negative for joint swelling, muscle cramps and muscle weakness.   Gastrointestinal:  Negative for abdominal pain, hematemesis, hematochezia, melena, nausea and vomiting.   Genitourinary:  Negative for dysuria and hematuria.   Neurological:  Negative for dizziness, focal weakness, headaches, light-headedness, loss of balance and weakness.   Psychiatric/Behavioral:  Negative for depression. The patient is not nervous/anxious.       Objective:    Physical Exam  Constitutional:       Appearance: She is  well-developed.   HENT:      Head: Normocephalic and atraumatic.   Neck:      Vascular: No JVD.   Cardiovascular:      Rate and Rhythm: Normal rate and regular rhythm.      Pulses:           Carotid pulses are 2+ on the right side and 2+ on the left side.       Radial pulses are 2+ on the right side and 2+ on the left side.        Femoral pulses are 2+ on the right side and 2+ on the left side.     Heart sounds: Normal heart sounds.   Pulmonary:      Effort: Pulmonary effort is normal.      Breath sounds: Normal breath sounds.   Abdominal:      General: Bowel sounds are normal.      Palpations: Abdomen is soft.   Musculoskeletal:      Cervical back: Neck supple.   Skin:     General: Skin is warm and dry.   Neurological:      Mental Status: She is alert and oriented to person, place, and time.   Psychiatric:         Behavior: Behavior normal.         Thought Content: Thought content normal.         Assessment:       1. Diastolic dysfunction, left ventricle    2. Tachycardia    3. Essential hypertension    4. Hypotension, unspecified hypotension type    5. Acute on chronic respiratory failure with hypoxia    6. severe stage 4 COPD    7. ESRD (end stage renal disease) on dialysis      76 y/o pt with hx and presentation as above. Will evaluate symptoms with Holter. ECG with sinus tach. Her tachycardia/palps likely compensatory from chronic hypoxemia from chronic lung issues. Already on BB. Will complete w/u with Holter. Ultimately, likely no treatment, given compensatory nature from chronic lung issues. Needs to stay active. Discussed the etiology, evaluation, and management of CHF, HTN, COPD, ESRD.m Discussed the importance of med compliance, heart healthy diet, and regular exercise.      Plan:       -Continue current medical management  -Holter 48 hours  -f/u 1 month

## 2022-12-08 RX ORDER — IPRATROPIUM BROMIDE AND ALBUTEROL SULFATE 2.5; .5 MG/3ML; MG/3ML
3 SOLUTION RESPIRATORY (INHALATION) EVERY 6 HOURS PRN
Qty: 180 ML | Refills: 11 | Status: SHIPPED | OUTPATIENT
Start: 2022-12-08 | End: 2023-03-07

## 2022-12-09 ENCOUNTER — HOSPITAL ENCOUNTER (OUTPATIENT)
Facility: HOSPITAL | Age: 77
Discharge: HOME | End: 2022-12-12
Attending: EMERGENCY MEDICINE | Admitting: HOSPITALIST
Payer: MEDICARE

## 2022-12-09 DIAGNOSIS — J96.21 ACUTE ON CHRONIC RESPIRATORY FAILURE WITH HYPOXIA: Primary | ICD-10-CM

## 2022-12-09 DIAGNOSIS — I50.9 ACUTE ON CHRONIC HEART FAILURE, UNSPECIFIED HEART FAILURE TYPE: ICD-10-CM

## 2022-12-09 DIAGNOSIS — R06.02 SOB (SHORTNESS OF BREATH): ICD-10-CM

## 2022-12-09 DIAGNOSIS — R53.81 PHYSICAL DECONDITIONING: ICD-10-CM

## 2022-12-09 DIAGNOSIS — R07.9 CHEST PAIN: ICD-10-CM

## 2022-12-09 DIAGNOSIS — T82.590S DIALYSIS AV FISTULA MALFUNCTION, SEQUELA: ICD-10-CM

## 2022-12-09 PROBLEM — E87.6 HYPOKALEMIA: Status: ACTIVE | Noted: 2022-12-09

## 2022-12-09 LAB
ALBUMIN SERPL BCP-MCNC: 3 G/DL (ref 3.5–5.2)
ALP SERPL-CCNC: 279 U/L (ref 55–135)
ALT SERPL W/O P-5'-P-CCNC: 9 U/L (ref 10–44)
ANION GAP SERPL CALC-SCNC: 8 MMOL/L (ref 8–16)
AST SERPL-CCNC: 12 U/L (ref 10–40)
BASOPHILS # BLD AUTO: 0.07 K/UL (ref 0–0.2)
BASOPHILS NFR BLD: 0.6 % (ref 0–1.9)
BILIRUB SERPL-MCNC: 0.5 MG/DL (ref 0.1–1)
BNP SERPL-MCNC: 303 PG/ML (ref 0–99)
BUN SERPL-MCNC: 5 MG/DL (ref 8–23)
CALCIUM SERPL-MCNC: 8.6 MG/DL (ref 8.7–10.5)
CHLORIDE SERPL-SCNC: 102 MMOL/L (ref 95–110)
CO2 SERPL-SCNC: 28 MMOL/L (ref 23–29)
CREAT SERPL-MCNC: 1.1 MG/DL (ref 0.5–1.4)
CRP SERPL-MCNC: 52.7 MG/L (ref 0–8.2)
DIFFERENTIAL METHOD: ABNORMAL
EOSINOPHIL # BLD AUTO: 0.7 K/UL (ref 0–0.5)
EOSINOPHIL NFR BLD: 5.7 % (ref 0–8)
ERYTHROCYTE [DISTWIDTH] IN BLOOD BY AUTOMATED COUNT: 13.8 % (ref 11.5–14.5)
EST. GFR  (NO RACE VARIABLE): 51 ML/MIN/1.73 M^2
GLUCOSE SERPL-MCNC: 96 MG/DL (ref 70–110)
HCO3 UR-SCNC: 27.5 MMOL/L (ref 24–28)
HCO3 UR-SCNC: 30.5 MMOL/L (ref 24–28)
HCT VFR BLD AUTO: 30.4 % (ref 37–48.5)
HGB BLD-MCNC: 9.2 G/DL (ref 12–16)
IMM GRANULOCYTES # BLD AUTO: 0.06 K/UL (ref 0–0.04)
IMM GRANULOCYTES NFR BLD AUTO: 0.5 % (ref 0–0.5)
INFLUENZA A, MOLECULAR: NEGATIVE
INFLUENZA B, MOLECULAR: NEGATIVE
LACTATE SERPL-SCNC: 0.7 MMOL/L (ref 0.5–2.2)
LYMPHOCYTES # BLD AUTO: 1 K/UL (ref 1–4.8)
LYMPHOCYTES NFR BLD: 8.7 % (ref 18–48)
MCH RBC QN AUTO: 31.4 PG (ref 27–31)
MCHC RBC AUTO-ENTMCNC: 30.3 G/DL (ref 32–36)
MCV RBC AUTO: 104 FL (ref 82–98)
MONOCYTES # BLD AUTO: 0.8 K/UL (ref 0.3–1)
MONOCYTES NFR BLD: 7.2 % (ref 4–15)
NEUTROPHILS # BLD AUTO: 8.9 K/UL (ref 1.8–7.7)
NEUTROPHILS NFR BLD: 77.3 % (ref 38–73)
NRBC BLD-RTO: 0 /100 WBC
PCO2 BLDA: 48.4 MMHG (ref 35–45)
PCO2 BLDA: 57.4 MMHG (ref 35–45)
PH SMN: 7.33 [PH] (ref 7.35–7.45)
PH SMN: 7.36 [PH] (ref 7.35–7.45)
PLATELET # BLD AUTO: 222 K/UL (ref 150–450)
PMV BLD AUTO: 9.1 FL (ref 9.2–12.9)
PO2 BLDA: 111 MMHG (ref 80–100)
PO2 BLDA: 48 MMHG (ref 40–60)
POC BE: 2 MMOL/L
POC BE: 5 MMOL/L
POC SATURATED O2: 79 % (ref 95–100)
POC SATURATED O2: 98 % (ref 95–100)
POC TCO2: 29 MMOL/L (ref 23–27)
POC TCO2: 32 MMOL/L (ref 24–29)
POTASSIUM SERPL-SCNC: 3.3 MMOL/L (ref 3.5–5.1)
PROCALCITONIN SERPL IA-MCNC: 0.31 NG/ML
PROT SERPL-MCNC: 8 G/DL (ref 6–8.4)
RBC # BLD AUTO: 2.93 M/UL (ref 4–5.4)
SAMPLE: ABNORMAL
SAMPLE: ABNORMAL
SARS-COV-2 RDRP RESP QL NAA+PROBE: NEGATIVE
SITE: ABNORMAL
SODIUM SERPL-SCNC: 138 MMOL/L (ref 136–145)
SPECIMEN SOURCE: NORMAL
TROPONIN I SERPL DL<=0.01 NG/ML-MCNC: 0.02 NG/ML (ref 0–0.03)
WBC # BLD AUTO: 11.54 K/UL (ref 3.9–12.7)

## 2022-12-09 PROCEDURE — U0002 COVID-19 LAB TEST NON-CDC: HCPCS | Performed by: EMERGENCY MEDICINE

## 2022-12-09 PROCEDURE — 93010 EKG 12-LEAD: ICD-10-PCS | Mod: ,,, | Performed by: INTERNAL MEDICINE

## 2022-12-09 PROCEDURE — 84145 PROCALCITONIN (PCT): CPT | Performed by: EMERGENCY MEDICINE

## 2022-12-09 PROCEDURE — 63600175 PHARM REV CODE 636 W HCPCS: Performed by: EMERGENCY MEDICINE

## 2022-12-09 PROCEDURE — 96367 TX/PROPH/DG ADDL SEQ IV INF: CPT

## 2022-12-09 PROCEDURE — 96365 THER/PROPH/DIAG IV INF INIT: CPT

## 2022-12-09 PROCEDURE — 87502 INFLUENZA DNA AMP PROBE: CPT | Performed by: EMERGENCY MEDICINE

## 2022-12-09 PROCEDURE — 25000242 PHARM REV CODE 250 ALT 637 W/ HCPCS: Performed by: EMERGENCY MEDICINE

## 2022-12-09 PROCEDURE — 99900035 HC TECH TIME PER 15 MIN (STAT)

## 2022-12-09 PROCEDURE — 83880 ASSAY OF NATRIURETIC PEPTIDE: CPT | Performed by: EMERGENCY MEDICINE

## 2022-12-09 PROCEDURE — G0378 HOSPITAL OBSERVATION PER HR: HCPCS

## 2022-12-09 PROCEDURE — 94761 N-INVAS EAR/PLS OXIMETRY MLT: CPT

## 2022-12-09 PROCEDURE — 85025 COMPLETE CBC W/AUTO DIFF WBC: CPT | Performed by: EMERGENCY MEDICINE

## 2022-12-09 PROCEDURE — 86140 C-REACTIVE PROTEIN: CPT | Performed by: EMERGENCY MEDICINE

## 2022-12-09 PROCEDURE — 84484 ASSAY OF TROPONIN QUANT: CPT | Performed by: EMERGENCY MEDICINE

## 2022-12-09 PROCEDURE — 83605 ASSAY OF LACTIC ACID: CPT | Performed by: EMERGENCY MEDICINE

## 2022-12-09 PROCEDURE — 87502 INFLUENZA DNA AMP PROBE: CPT

## 2022-12-09 PROCEDURE — 93005 ELECTROCARDIOGRAM TRACING: CPT

## 2022-12-09 PROCEDURE — 80053 COMPREHEN METABOLIC PANEL: CPT | Performed by: EMERGENCY MEDICINE

## 2022-12-09 PROCEDURE — 94640 AIRWAY INHALATION TREATMENT: CPT

## 2022-12-09 PROCEDURE — 99285 EMERGENCY DEPT VISIT HI MDM: CPT | Mod: 25

## 2022-12-09 PROCEDURE — 93010 ELECTROCARDIOGRAM REPORT: CPT | Mod: ,,, | Performed by: INTERNAL MEDICINE

## 2022-12-09 PROCEDURE — 27000221 HC OXYGEN, UP TO 24 HOURS

## 2022-12-09 PROCEDURE — 36600 WITHDRAWAL OF ARTERIAL BLOOD: CPT

## 2022-12-09 PROCEDURE — 87040 BLOOD CULTURE FOR BACTERIA: CPT | Performed by: EMERGENCY MEDICINE

## 2022-12-09 PROCEDURE — 25000003 PHARM REV CODE 250: Performed by: EMERGENCY MEDICINE

## 2022-12-09 PROCEDURE — 96375 TX/PRO/DX INJ NEW DRUG ADDON: CPT

## 2022-12-09 PROCEDURE — 82803 BLOOD GASES ANY COMBINATION: CPT

## 2022-12-09 RX ORDER — ONDANSETRON 2 MG/ML
4 INJECTION INTRAMUSCULAR; INTRAVENOUS EVERY 6 HOURS PRN
Status: DISCONTINUED | OUTPATIENT
Start: 2022-12-09 | End: 2022-12-12 | Stop reason: HOSPADM

## 2022-12-09 RX ORDER — SODIUM CHLORIDE 0.9 % (FLUSH) 0.9 %
10 SYRINGE (ML) INJECTION EVERY 12 HOURS PRN
Status: DISCONTINUED | OUTPATIENT
Start: 2022-12-09 | End: 2022-12-12 | Stop reason: HOSPADM

## 2022-12-09 RX ORDER — IPRATROPIUM BROMIDE AND ALBUTEROL SULFATE 2.5; .5 MG/3ML; MG/3ML
3 SOLUTION RESPIRATORY (INHALATION) ONCE
Status: COMPLETED | OUTPATIENT
Start: 2022-12-09 | End: 2022-12-09

## 2022-12-09 RX ORDER — CEFTRIAXONE 2 G/50ML
2 INJECTION, SOLUTION INTRAVENOUS ONCE
Status: DISCONTINUED | OUTPATIENT
Start: 2022-12-09 | End: 2022-12-09

## 2022-12-09 RX ORDER — AMLODIPINE BESYLATE 5 MG/1
5 TABLET ORAL DAILY
Status: DISCONTINUED | OUTPATIENT
Start: 2022-12-10 | End: 2022-12-11

## 2022-12-09 RX ORDER — MAG HYDROX/ALUMINUM HYD/SIMETH 200-200-20
30 SUSPENSION, ORAL (FINAL DOSE FORM) ORAL EVERY 6 HOURS PRN
Status: DISCONTINUED | OUTPATIENT
Start: 2022-12-09 | End: 2022-12-12 | Stop reason: HOSPADM

## 2022-12-09 RX ORDER — METHYLPREDNISOLONE SOD SUCC 125 MG
80 VIAL (EA) INJECTION EVERY 6 HOURS
Status: DISCONTINUED | OUTPATIENT
Start: 2022-12-10 | End: 2022-12-11

## 2022-12-09 RX ORDER — METOPROLOL TARTRATE 25 MG/1
25 TABLET, FILM COATED ORAL 2 TIMES DAILY
Status: DISCONTINUED | OUTPATIENT
Start: 2022-12-09 | End: 2022-12-11

## 2022-12-09 RX ORDER — ACETAMINOPHEN 325 MG/1
650 TABLET ORAL EVERY 6 HOURS PRN
Status: DISCONTINUED | OUTPATIENT
Start: 2022-12-09 | End: 2022-12-12 | Stop reason: HOSPADM

## 2022-12-09 RX ORDER — METHYLPREDNISOLONE SOD SUCC 125 MG
80 VIAL (EA) INJECTION EVERY 6 HOURS
Status: DISCONTINUED | OUTPATIENT
Start: 2022-12-10 | End: 2022-12-09

## 2022-12-09 RX ORDER — METHYLPREDNISOLONE SOD SUCC 125 MG
125 VIAL (EA) INJECTION
Status: DISCONTINUED | OUTPATIENT
Start: 2022-12-09 | End: 2022-12-09

## 2022-12-09 RX ORDER — METHYLPREDNISOLONE SOD SUCC 125 MG
80 VIAL (EA) INJECTION
Status: COMPLETED | OUTPATIENT
Start: 2022-12-09 | End: 2022-12-09

## 2022-12-09 RX ORDER — ATORVASTATIN CALCIUM 40 MG/1
80 TABLET, FILM COATED ORAL DAILY
Status: DISCONTINUED | OUTPATIENT
Start: 2022-12-10 | End: 2022-12-12 | Stop reason: HOSPADM

## 2022-12-09 RX ORDER — ALLOPURINOL 100 MG/1
100 TABLET ORAL
Status: DISCONTINUED | OUTPATIENT
Start: 2022-12-10 | End: 2022-12-12 | Stop reason: HOSPADM

## 2022-12-09 RX ORDER — ALLOPURINOL 100 MG/1
100 TABLET ORAL DAILY
Status: DISCONTINUED | OUTPATIENT
Start: 2022-12-10 | End: 2022-12-09

## 2022-12-09 RX ORDER — SEVELAMER CARBONATE 800 MG/1
800 TABLET, FILM COATED ORAL
Status: DISCONTINUED | OUTPATIENT
Start: 2022-12-10 | End: 2022-12-10

## 2022-12-09 RX ORDER — DULOXETIN HYDROCHLORIDE 30 MG/1
30 CAPSULE, DELAYED RELEASE ORAL DAILY
Status: DISCONTINUED | OUTPATIENT
Start: 2022-12-10 | End: 2022-12-09

## 2022-12-09 RX ORDER — IPRATROPIUM BROMIDE AND ALBUTEROL SULFATE 2.5; .5 MG/3ML; MG/3ML
3 SOLUTION RESPIRATORY (INHALATION) EVERY 4 HOURS PRN
Status: DISCONTINUED | OUTPATIENT
Start: 2022-12-09 | End: 2022-12-12 | Stop reason: HOSPADM

## 2022-12-09 RX ORDER — BUSPIRONE HYDROCHLORIDE 5 MG/1
10 TABLET ORAL 2 TIMES DAILY
Status: DISCONTINUED | OUTPATIENT
Start: 2022-12-09 | End: 2022-12-09

## 2022-12-09 RX ORDER — ACETAMINOPHEN 325 MG/1
650 TABLET ORAL
Status: DISPENSED | OUTPATIENT
Start: 2022-12-09 | End: 2022-12-10

## 2022-12-09 RX ADMIN — CEFTRIAXONE SODIUM 2 G: 2 INJECTION, POWDER, FOR SOLUTION INTRAMUSCULAR; INTRAVENOUS at 08:12

## 2022-12-09 RX ADMIN — AZITHROMYCIN MONOHYDRATE 500 MG: 500 INJECTION, POWDER, LYOPHILIZED, FOR SOLUTION INTRAVENOUS at 09:12

## 2022-12-09 RX ADMIN — IPRATROPIUM BROMIDE AND ALBUTEROL SULFATE 3 ML: 2.5; .5 SOLUTION RESPIRATORY (INHALATION) at 06:12

## 2022-12-09 RX ADMIN — METHYLPREDNISOLONE SODIUM SUCCINATE 80 MG: 125 INJECTION, POWDER, FOR SOLUTION INTRAMUSCULAR; INTRAVENOUS at 08:12

## 2022-12-09 NOTE — ED PROVIDER NOTES
Encounter Date: 12/9/2022    SCRIBE #1 NOTE: I, Eddie Burr, am scribing for, and in the presence of,  Carmine Lezama MD.     History     Chief Complaint   Patient presents with    Shortness of Breath     Pt c/o of SOB x3 days. Dialysis pt and last dialysis today. Congested cough and on3L NC. Distress noted and tachypnea+.      Katie Quevedo is a 77 y.o. female who  has a past medical history of Acute congestive heart failure (02/10/2020), Anemia, Bilateral renal cysts, Cataract, Chronic LBP (7/26/2012), Chronic pain, CKD (chronic kidney disease), stage IV, Colon polyp (2013), COPD (chronic obstructive pulmonary disease), Dehydration, Encounter for blood transfusion, HTN (hypertension), Lumbar spondylosis, Melanoma, Metabolic bone disease, Migraines, neuralgic, Osteoporosis, Primary osteoarthritis of both knees, Pulmonary embolism with infarction, Seizures (1972), Subdeltoid bursitis, L>R. (9/27/2012), Ulcer, and Vitamin D deficiency disease.    The patient presents to the ED due to Shortness of Breath.   Dialysis patient on 3L NC of O2 with last dialysis today, reports Shortness of Breath. Patient's nurse reports symptoms started three days ago with associated cough and congestion. Patient reports they were able to get 1.8 fluids when she usually gets 1.5 out. She denies abdominal pain, vomiting, diarrhea, dysuria, fever, and other associated factors. No known sick contacts recently. Patient tolerating medicine appropriately. No alleviating factors reported.           The history is provided by the patient and a caregiver.   Review of patient's allergies indicates:   Allergen Reactions    Aspirin      Other reaction(s): hx of ulcers    Tetracycline Swelling     Other reaction(s): Swelling    Penicillins Rash     Other reaction(s): Hives  Other reaction(s): Rash  Other reaction(s): Rash  Other reaction(s): Hives     Past Medical History:   Diagnosis Date    Acute congestive heart failure 02/10/2020     Anemia     Bilateral renal cysts     Cataract     Chronic LBP 7/26/2012    Chronic pain     CKD (chronic kidney disease), stage IV     Colon polyp 2013    COPD (chronic obstructive pulmonary disease)     Dehydration     Encounter for blood transfusion     HTN (hypertension)     Lumbar spondylosis     Melanoma     of the lip    Metabolic bone disease     Migraines, neuralgic     Osteoporosis     Primary osteoarthritis of both knees     s/p Rt TKA    Pulmonary embolism with infarction     Seizures 1972    x1 only    Subdeltoid bursitis, L>R. 9/27/2012    Ulcer     Vitamin D deficiency disease      Past Surgical History:   Procedure Laterality Date    BLADDER SUSPENSION      CATARACT EXTRACTION  11/18/13    left eye    CERVICAL LAMINECTOMY      x3, fusion x1    COLONOSCOPY  2009    DECLOTTING OF ARTERIOVENOUS GRAFT Left 1/3/2022    Procedure: [id number];  Surgeon: Lindsey Louie MD;  Location: [facility] OR;  Service: Vascular;  Laterality: Left;    DECLOTTING OF ARTERIOVENOUS GRAFT Left 2/8/2022    Procedure: [id number];  Surgeon: Lindsey Louie MD;  Location: [facility] OR;  Service: Vascular;  Laterality: Left;    DECLOTTING OF ARTERIOVENOUS GRAFT Left 4/8/2022    Procedure: [id number];  Surgeon: Lindsey Louie MD;  Location: [facility] OR;  Service: Vascular;  Laterality: Left;    FISTULOGRAM N/A 2/27/2020    Procedure: Fistulogram;  Surgeon: Noe Benitez MD;  Location: [facility] CATH LAB/EP;  Service: Cardiology;  Laterality: N/A;    HYSTERECTOMY      JOINT REPLACEMENT  2001    total right knee     LUMBAR LAMINECTOMY      x 3, fusion x1    OOPHORECTOMY      PERIPHERAL ANGIOGRAPHY N/A 3/9/2020    Procedure: Peripheral angiography;  Surgeon: Jacinto Henriquez MD;  Location: [facility] CATH LAB/EP;  Service: Cardiology;  Laterality: N/A;    PLACEMENT OF ARTERIOVENOUS GRAFT Left 1/21/2020    Procedure: INSERTION, GRAFT, ARTERIOVENOUS;  Surgeon: Lindsey Louie MD;  Location: [facility] OR;  Service:  General;  Laterality: Left;    PLACEMENT OF ARTERIOVENOUS GRAFT Right 7/22/2022    Procedure: INSERTION, GRAFT, ARTERIOVENOUS;  Surgeon: Lindsey Louie MD;  Location: [facility] OR;  Service: General;  Laterality: Right;    PLACEMENT OF DUAL-LUMEN VASCULAR CATHETER Right 4/16/2022    Procedure: INSERTION-CATHETER-RICKI;  Surgeon: Lindsey Louie MD;  Location: [facility] OR;  Service: General;  Laterality: Right;    THROMBECTOMY Left 2/3/2020    Procedure: THROMBECTOMY;  Surgeon: Lindsey Louie MD;  Location: [facility] OR;  Service: General;  Laterality: Left;    THROMBECTOMY  3/9/2020    Procedure: Thrombectomy;  Surgeon: Jacinto Henriquez MD;  Location: [facility] CATH LAB/EP;  Service: Cardiology;;    THROMBECTOMY Left 4/7/2022    Procedure: THROMBECTOMY;  Surgeon: Lindsey Louie MD;  Location: [facility] OR;  Service: General;  Laterality: Left;     Family History   Problem Relation Age of Onset    Arthritis Mother     Stroke Mother     Hypertension Father     Cancer Father     Cataracts Father     Diabetes Maternal Aunt     Hypertension Maternal Grandfather     Heart disease Maternal Grandfather     Heart attack Maternal Grandfather     Cataracts Sister     Glaucoma Cousin      Social History     Tobacco Use    Smoking status: Former     Types: Cigarettes    Smokeless tobacco: Former     Quit date: 2/3/2015   Substance Use Topics    Alcohol use: Not Currently     Comment: Rare    Drug use: No     Review of Systems   Constitutional:  Negative for fever.   HENT:  Negative for sore throat.    Respiratory:  Positive for shortness of breath.    Cardiovascular:  Negative for chest pain.   Gastrointestinal:  Negative for nausea.   Genitourinary:  Negative for dysuria.   Musculoskeletal:  Negative for back pain.   Skin:  Negative for rash.   Neurological:  Negative for weakness.   Hematological:  Does not bruise/bleed easily.     Physical Exam     Initial Vitals [12/09/22 1642]   BP Pulse Resp Temp SpO2   (!) 177/78 (!)  115 (!) 25 98.6 °F (37 °C) (!) 86 %      MAP       --         Physical Exam    Nursing note and vitals reviewed.  Constitutional: She is not diaphoretic. She appears distressed.   Ill-appearing, tachypneic elderly female, on 3 L via nasal cannula   HENT:   Head: Normocephalic and atraumatic.   Nose: Nose normal.   Eyes: EOM are normal. Pupils are equal, round, and reactive to light.   Neck: Neck supple. No JVD present.   Normal range of motion.  Cardiovascular:  Regular rhythm, normal heart sounds and intact distal pulses.           Tachycardic   Pulmonary/Chest: No stridor. She is in respiratory distress (tachypneic with diffuse wheezing noted). She has wheezes. She has no rales.   Vas-Cath in place over right chest wall   Abdominal: Abdomen is soft. Bowel sounds are normal. She exhibits no distension. There is no abdominal tenderness.   Musculoskeletal:         General: No tenderness or edema. Normal range of motion.      Cervical back: Normal range of motion and neck supple.     Neurological: She is alert and oriented to person, place, and time. She has normal strength.   Skin: Skin is warm and dry. Capillary refill takes less than 2 seconds. No rash noted. No erythema.       ED Course   Procedures  Labs Reviewed   CBC W/ AUTO DIFFERENTIAL - Abnormal; Notable for the following components:       Result Value    RBC 2.93 (*)     Hemoglobin 9.2 (*)     Hematocrit 30.4 (*)      (*)     MCH 31.4 (*)     MCHC 30.3 (*)     MPV 9.1 (*)     Gran # (ANC) 8.9 (*)     Immature Grans (Abs) 0.06 (*)     Eos # 0.7 (*)     Gran % 77.3 (*)     Lymph % 8.7 (*)     All other components within normal limits   COMPREHENSIVE METABOLIC PANEL - Abnormal; Notable for the following components:    Potassium 3.3 (*)     BUN 5 (*)     Calcium 8.6 (*)     Albumin 3.0 (*)     Alkaline Phosphatase 279 (*)     ALT 9 (*)     eGFR 51 (*)     All other components within normal limits   C-REACTIVE PROTEIN - Abnormal; Notable for the following  components:    CRP 52.7 (*)     All other components within normal limits   PROCALCITONIN - Abnormal; Notable for the following components:    Procalcitonin 0.31 (*)     All other components within normal limits   B-TYPE NATRIURETIC PEPTIDE - Abnormal; Notable for the following components:     (*)     All other components within normal limits   ISTAT PROCEDURE - Abnormal; Notable for the following components:    POC PH 7.332 (*)     POC PCO2 57.4 (*)     POC HCO3 30.5 (*)     POC SATURATED O2 79 (*)     POC TCO2 32 (*)     All other components within normal limits   ISTAT PROCEDURE - Abnormal; Notable for the following components:    POC PCO2 48.4 (*)     POC PO2 111 (*)     POC TCO2 29 (*)     All other components within normal limits   INFLUENZA A & B BY MOLECULAR   CULTURE, BLOOD   CULTURE, BLOOD   LACTIC ACID, PLASMA   TROPONIN I   SARS-COV-2 RNA AMPLIFICATION, QUAL   URINALYSIS, REFLEX TO URINE CULTURE          Imaging Results              X-Ray Chest AP Portable (Final result)  Result time 12/09/22 19:21:29      Final result by Gavin Ramesh MD (12/09/22 19:21:29)                   Impression:      1. Chronic appearing interstitial findings.  Increased parenchymal attenuation projects over the bilateral lower lung zones, also similar to the previous examination noting small left pleural effusion.  Correlation with any history of COPD/emphysema.      Electronically signed by: Gavin Ramesh MD  Date:    12/09/2022  Time:    19:21               Narrative:    EXAMINATION:  XR CHEST AP PORTABLE    CLINICAL HISTORY:  COVID-19;    TECHNIQUE:  Single frontal view of the chest was performed.    COMPARISON:  11/24/2022    FINDINGS:  Right central venous catheter tip projects over the mid to distal SVC.  The cardiomediastinal silhouette is magnified by technique noting calcification of the aorta..  There is obscuration of the left costophrenic angle suggesting effusion.  The trachea is midline.  The lungs  are symmetrically expanded bilaterally with coarse interstitial attenuation bilaterally.  There is increased parenchymal attenuation projected over the left lower lung zone, and to a lesser degree the right lower lung zone.  There is no pneumothorax.  The osseous structures are remarkable for degenerative changes and osteopenia.  Left vascular stent noted..                                       Medications   acetaminophen tablet 650 mg (650 mg Oral Not Given 12/9/22 1745)   acetaminophen tablet 650 mg (has no administration in time range)   ondansetron injection 4 mg (has no administration in time range)   aluminum-magnesium hydroxide-simethicone 200-200-20 mg/5 mL suspension 30 mL (has no administration in time range)   albuterol-ipratropium 2.5 mg-0.5 mg/3 mL nebulizer solution 3 mL (has no administration in time range)   sodium chloride 0.9% flush 10 mL (has no administration in time range)   cefTRIAXone (ROCEPHIN) 1 g/50 mL D5W IVPB (has no administration in time range)   azithromycin 500 mg in dextrose 5 % 250 mL IVPB (ready to mix system) (has no administration in time range)   amLODIPine tablet 5 mg (has no administration in time range)   atorvastatin tablet 80 mg (has no administration in time range)   metoprolol tartrate (LOPRESSOR) tablet 25 mg (0 mg Oral Hold 12/9/22 2345)   allopurinoL tablet 100 mg (has no administration in time range)   sevelamer carbonate tablet 800 mg (has no administration in time range)   methylPREDNISolone sodium succinate injection 80 mg (80 mg Intravenous Given by Other 12/10/22 0238)   albuterol-ipratropium 2.5 mg-0.5 mg/3 mL nebulizer solution 3 mL (3 mLs Nebulization Given 12/9/22 1815)   azithromycin 500 mg in dextrose 5 % 250 mL IVPB (ready to mix system) (0 mg Intravenous Stopped 12/9/22 2231)   methylPREDNISolone sodium succinate injection 80 mg (80 mg Intravenous Given 12/9/22 2022)   cefTRIAXone (ROCEPHIN) 2 g in dextrose 5 % 50 mL IVPB (ready to mix system) (0 g  Intravenous Stopped 12/9/22 0248)          MDM:    77-year-old female with past medical history as noted above presenting with shortness of breath.  Physical exam as noted above, ED workup notable for pH 7.36, pCO2 48, bicarb 27.5, flu negative, COVID negative, hemoglobin 9.2, CMP with creatinine 1.1, CRP 52, lactate 0.7, troponin 0.024, procalcitonin 0.31, , chest x-ray with noted chronic findings with small left pleural effusion.  Patient presentation concerning for possible pneumonia with evidence of small left pleural effusion on x-ray today.  Covered with Rocephin and azithromycin, additionally Solu-Medrol and given.  Patient continues to be tachypneic requiring slightly increased amount of oxygen.  Given her multiple comorbidities, increased respiratory rate will place in observation with Hospital Medicine for further evaluation and management. Discussed diagnosis and further treatment with patient and family at bedside. All questions answered, patient transferred to floor improved and stable.          Scribe Attestation:   Scribe #1: I performed the above scribed service and the documentation accurately describes the services I performed. I attest to the accuracy of the note.                   Clinical Impression:   Final diagnoses:  [R07.9] Chest pain     Physician Attestation for Scribe: I, Eddie Burr, reviewed documentation as scribed in my presence, which is both accurate and complete.    ED Disposition Condition    Observation Stable                Carmine Lezama MD  12/10/22 0976

## 2022-12-10 PROBLEM — R53.81 PHYSICAL DECONDITIONING: Status: ACTIVE | Noted: 2022-12-10

## 2022-12-10 LAB
ALBUMIN SERPL BCP-MCNC: 2.7 G/DL (ref 3.5–5.2)
ALLENS TEST: ABNORMAL
ALP SERPL-CCNC: 256 U/L (ref 55–135)
ALT SERPL W/O P-5'-P-CCNC: 8 U/L (ref 10–44)
ANION GAP SERPL CALC-SCNC: 9 MMOL/L (ref 8–16)
AST SERPL-CCNC: 12 U/L (ref 10–40)
BASOPHILS # BLD AUTO: 0.05 K/UL (ref 0–0.2)
BASOPHILS NFR BLD: 0.6 % (ref 0–1.9)
BILIRUB SERPL-MCNC: 0.4 MG/DL (ref 0.1–1)
BUN SERPL-MCNC: 8 MG/DL (ref 8–23)
CALCIUM SERPL-MCNC: 9 MG/DL (ref 8.7–10.5)
CHLORIDE SERPL-SCNC: 102 MMOL/L (ref 95–110)
CO2 SERPL-SCNC: 26 MMOL/L (ref 23–29)
CREAT SERPL-MCNC: 1.4 MG/DL (ref 0.5–1.4)
DELSYS: ABNORMAL
DIFFERENTIAL METHOD: ABNORMAL
EOSINOPHIL # BLD AUTO: 0 K/UL (ref 0–0.5)
EOSINOPHIL NFR BLD: 0.1 % (ref 0–8)
ERYTHROCYTE [DISTWIDTH] IN BLOOD BY AUTOMATED COUNT: 13.6 % (ref 11.5–14.5)
ERYTHROCYTE [SEDIMENTATION RATE] IN BLOOD BY WESTERGREN METHOD: 28 MM/H
EST. GFR  (NO RACE VARIABLE): 38 ML/MIN/1.73 M^2
FIO2: 28
FLOW: 2
GLUCOSE SERPL-MCNC: 136 MG/DL (ref 70–110)
HCO3 UR-SCNC: 28.9 MMOL/L (ref 24–28)
HCT VFR BLD AUTO: 30.7 % (ref 37–48.5)
HGB BLD-MCNC: 9.4 G/DL (ref 12–16)
IMM GRANULOCYTES # BLD AUTO: 0.05 K/UL (ref 0–0.04)
IMM GRANULOCYTES NFR BLD AUTO: 0.6 % (ref 0–0.5)
LYMPHOCYTES # BLD AUTO: 0.5 K/UL (ref 1–4.8)
LYMPHOCYTES NFR BLD: 6.1 % (ref 18–48)
MAGNESIUM SERPL-MCNC: 1.8 MG/DL (ref 1.6–2.6)
MCH RBC QN AUTO: 30.9 PG (ref 27–31)
MCHC RBC AUTO-ENTMCNC: 30.6 G/DL (ref 32–36)
MCV RBC AUTO: 101 FL (ref 82–98)
MODE: ABNORMAL
MONOCYTES # BLD AUTO: 0.1 K/UL (ref 0.3–1)
MONOCYTES NFR BLD: 0.7 % (ref 4–15)
NEUTROPHILS # BLD AUTO: 7.9 K/UL (ref 1.8–7.7)
NEUTROPHILS NFR BLD: 91.9 % (ref 38–73)
NRBC BLD-RTO: 0 /100 WBC
PCO2 BLDA: 49.1 MMHG (ref 35–45)
PH SMN: 7.38 [PH] (ref 7.35–7.45)
PHOSPHATE SERPL-MCNC: 2.9 MG/DL (ref 2.7–4.5)
PLATELET # BLD AUTO: 213 K/UL (ref 150–450)
PMV BLD AUTO: 9.5 FL (ref 9.2–12.9)
PO2 BLDA: 85 MMHG (ref 80–100)
POC BE: 4 MMOL/L
POC SATURATED O2: 96 % (ref 95–100)
POC TCO2: 30 MMOL/L (ref 23–27)
POCT GLUCOSE: 133 MG/DL (ref 70–110)
POTASSIUM SERPL-SCNC: 3.7 MMOL/L (ref 3.5–5.1)
PROT SERPL-MCNC: 7.6 G/DL (ref 6–8.4)
RBC # BLD AUTO: 3.04 M/UL (ref 4–5.4)
SAMPLE: ABNORMAL
SITE: ABNORMAL
SODIUM SERPL-SCNC: 137 MMOL/L (ref 136–145)
SP02: 95
WBC # BLD AUTO: 8.64 K/UL (ref 3.9–12.7)

## 2022-12-10 PROCEDURE — 83735 ASSAY OF MAGNESIUM: CPT | Performed by: NURSE PRACTITIONER

## 2022-12-10 PROCEDURE — 84100 ASSAY OF PHOSPHORUS: CPT | Performed by: NURSE PRACTITIONER

## 2022-12-10 PROCEDURE — 80053 COMPREHEN METABOLIC PANEL: CPT | Performed by: NURSE PRACTITIONER

## 2022-12-10 PROCEDURE — 25000003 PHARM REV CODE 250: Performed by: NURSE PRACTITIONER

## 2022-12-10 PROCEDURE — 63600175 PHARM REV CODE 636 W HCPCS: Performed by: NURSE PRACTITIONER

## 2022-12-10 PROCEDURE — 94640 AIRWAY INHALATION TREATMENT: CPT | Mod: XB

## 2022-12-10 PROCEDURE — 97530 THERAPEUTIC ACTIVITIES: CPT

## 2022-12-10 PROCEDURE — 25000003 PHARM REV CODE 250: Performed by: HOSPITALIST

## 2022-12-10 PROCEDURE — 99900035 HC TECH TIME PER 15 MIN (STAT)

## 2022-12-10 PROCEDURE — G0378 HOSPITAL OBSERVATION PER HR: HCPCS

## 2022-12-10 PROCEDURE — 25000003 PHARM REV CODE 250: Performed by: INTERNAL MEDICINE

## 2022-12-10 PROCEDURE — 96367 TX/PROPH/DG ADDL SEQ IV INF: CPT

## 2022-12-10 PROCEDURE — 96366 THER/PROPH/DIAG IV INF ADDON: CPT

## 2022-12-10 PROCEDURE — 96376 TX/PRO/DX INJ SAME DRUG ADON: CPT

## 2022-12-10 PROCEDURE — 94761 N-INVAS EAR/PLS OXIMETRY MLT: CPT

## 2022-12-10 PROCEDURE — 27000221 HC OXYGEN, UP TO 24 HOURS

## 2022-12-10 PROCEDURE — 94640 AIRWAY INHALATION TREATMENT: CPT

## 2022-12-10 PROCEDURE — 82803 BLOOD GASES ANY COMBINATION: CPT

## 2022-12-10 PROCEDURE — 97165 OT EVAL LOW COMPLEX 30 MIN: CPT

## 2022-12-10 PROCEDURE — 96365 THER/PROPH/DIAG IV INF INIT: CPT | Mod: 59

## 2022-12-10 PROCEDURE — 96375 TX/PRO/DX INJ NEW DRUG ADDON: CPT

## 2022-12-10 PROCEDURE — 63600175 PHARM REV CODE 636 W HCPCS: Performed by: HOSPITALIST

## 2022-12-10 PROCEDURE — 94799 UNLISTED PULMONARY SVC/PX: CPT

## 2022-12-10 PROCEDURE — 85025 COMPLETE CBC W/AUTO DIFF WBC: CPT | Performed by: NURSE PRACTITIONER

## 2022-12-10 PROCEDURE — 36600 WITHDRAWAL OF ARTERIAL BLOOD: CPT

## 2022-12-10 PROCEDURE — 25000242 PHARM REV CODE 250 ALT 637 W/ HCPCS: Performed by: NURSE PRACTITIONER

## 2022-12-10 PROCEDURE — 36415 COLL VENOUS BLD VENIPUNCTURE: CPT | Performed by: NURSE PRACTITIONER

## 2022-12-10 RX ORDER — LACTULOSE 10 G/15ML
20 SOLUTION ORAL EVERY 8 HOURS PRN
Status: DISCONTINUED | OUTPATIENT
Start: 2022-12-10 | End: 2022-12-12 | Stop reason: HOSPADM

## 2022-12-10 RX ORDER — HEPARIN SODIUM 5000 [USP'U]/ML
5000 INJECTION, SOLUTION INTRAVENOUS; SUBCUTANEOUS
Status: DISCONTINUED | OUTPATIENT
Start: 2022-12-10 | End: 2022-12-12 | Stop reason: HOSPADM

## 2022-12-10 RX ORDER — MUPIROCIN 20 MG/G
OINTMENT TOPICAL 2 TIMES DAILY
Status: DISCONTINUED | OUTPATIENT
Start: 2022-12-10 | End: 2022-12-12 | Stop reason: HOSPADM

## 2022-12-10 RX ORDER — SODIUM CHLORIDE 9 MG/ML
INJECTION, SOLUTION INTRAVENOUS ONCE
Status: DISCONTINUED | OUTPATIENT
Start: 2022-12-10 | End: 2022-12-12 | Stop reason: HOSPADM

## 2022-12-10 RX ORDER — HYDRALAZINE HYDROCHLORIDE 20 MG/ML
10 INJECTION INTRAMUSCULAR; INTRAVENOUS EVERY 6 HOURS PRN
Status: DISCONTINUED | OUTPATIENT
Start: 2022-12-10 | End: 2022-12-12 | Stop reason: HOSPADM

## 2022-12-10 RX ADMIN — IPRATROPIUM BROMIDE AND ALBUTEROL SULFATE 3 ML: 2.5; .5 SOLUTION RESPIRATORY (INHALATION) at 04:12

## 2022-12-10 RX ADMIN — AMLODIPINE BESYLATE 5 MG: 5 TABLET ORAL at 08:12

## 2022-12-10 RX ADMIN — ACETAMINOPHEN 650 MG: 325 TABLET ORAL at 09:12

## 2022-12-10 RX ADMIN — PROMETHAZINE HYDROCHLORIDE 12.5 MG: 25 INJECTION INTRAMUSCULAR; INTRAVENOUS at 08:12

## 2022-12-10 RX ADMIN — ATORVASTATIN CALCIUM 80 MG: 40 TABLET, FILM COATED ORAL at 08:12

## 2022-12-10 RX ADMIN — ALLOPURINOL 100 MG: 100 TABLET ORAL at 08:12

## 2022-12-10 RX ADMIN — MUPIROCIN: 20 OINTMENT TOPICAL at 10:12

## 2022-12-10 RX ADMIN — METHYLPREDNISOLONE SODIUM SUCCINATE 80 MG: 125 INJECTION, POWDER, FOR SOLUTION INTRAMUSCULAR; INTRAVENOUS at 05:12

## 2022-12-10 RX ADMIN — METHYLPREDNISOLONE SODIUM SUCCINATE 80 MG: 125 INJECTION, POWDER, FOR SOLUTION INTRAMUSCULAR; INTRAVENOUS at 02:12

## 2022-12-10 RX ADMIN — AZITHROMYCIN MONOHYDRATE 500 MG: 500 INJECTION, POWDER, LYOPHILIZED, FOR SOLUTION INTRAVENOUS at 10:12

## 2022-12-10 RX ADMIN — IPRATROPIUM BROMIDE AND ALBUTEROL SULFATE 3 ML: 2.5; .5 SOLUTION RESPIRATORY (INHALATION) at 05:12

## 2022-12-10 RX ADMIN — METOPROLOL TARTRATE 25 MG: 25 TABLET, FILM COATED ORAL at 08:12

## 2022-12-10 RX ADMIN — ONDANSETRON 4 MG: 2 INJECTION INTRAMUSCULAR; INTRAVENOUS at 06:12

## 2022-12-10 RX ADMIN — MUPIROCIN: 20 OINTMENT TOPICAL at 08:12

## 2022-12-10 RX ADMIN — CEFTRIAXONE 1 G: 1 INJECTION, POWDER, FOR SOLUTION INTRAMUSCULAR; INTRAVENOUS at 11:12

## 2022-12-10 RX ADMIN — METHYLPREDNISOLONE SODIUM SUCCINATE 80 MG: 125 INJECTION, POWDER, FOR SOLUTION INTRAMUSCULAR; INTRAVENOUS at 11:12

## 2022-12-10 RX ADMIN — NEPHROCAP 1 CAPSULE: 1 CAP ORAL at 08:12

## 2022-12-10 NOTE — PT/OT/SLP EVAL
Occupational Therapy   Evaluation & Tx    Name: Katie Quevedo  MRN: [id number]  Admitting Diagnosis: Acute on chronic respiratory failure with hypoxia  Recent Surgery: * No surgery found *      Recommendations:     Discharge Recommendations: home health OT  Discharge Equipment Recommendations:  bedside commode  Barriers to discharge:  None    Assessment:     Katie Quevedo is a 77 y.o. female with a medical diagnosis of Acute on chronic respiratory failure with hypoxia.  She presents with the following performance deficits affecting function: weakness, impaired endurance, impaired self care skills, impaired functional mobility, gait instability, impaired balance, decreased coordination, decreased upper extremity function, decreased lower extremity function, decreased safety awareness, decreased ROM, impaired coordination, impaired cardiopulmonary response to activity.  Pt was agreeable to and participated in OT evaluation.  Participate in OT evaluation was limited due to pt's weakness and impaired endurance at the time.  Pt reports that she ambulated within her home while holding onto furniture and walls, but used a rollator or scooter in the community.  Pt currently required min-mod A with bed mobility and 3 side-steps next to bed (to weak for more).  She reports that her PLOF was mod I with mobility and set-up to min A with bathing and dressing (lower body).  Goals established to assist pt with returning to PLOF regarding ADLs and func mobility.  Pt will benefit from skilled OT services in order to increase her level of safety and independence with ADLs and mobility.      Rehab Prognosis: Fair; patient would benefit from acute skilled OT services to address these deficits and reach maximum level of function.       Plan:     Patient to be seen 3 x/week to address the above listed problems via self-care/home management, therapeutic activities, therapeutic exercises  Plan of Care Expires: 01/09/23  Plan of Care Reviewed  with: patient    Subjective     Chief Complaint: weakness  Patient/Family Comments/goals: pt sad and crying at end of session over how weak she is feeling    Occupational Profile:  Living Environment: pt lives with  in [facility] with THE entrance - has walk-in tub (with door) and built in seat - grab bars by tub and toilet  Previous level of function: ambulates within home without DME (holds onto furniture and walls) - scooter or rollator in community; assistance with bathing and dressing  Roles and Routines: mother, wife - does not drive  Equipment Used at Home: walker, rolling, rollator, bath bench, grab bar, oxygen (scooter)  Assistance upon Discharge: assistance from  and daughter    Pain/Comfort:  Pain Rating 1: 0/10  Pain Rating Post-Intervention 1: 0/10    Patients cultural, spiritual, [employer] conflicts given the current situation: no    Objective:     Communicated with: nurse prior to session.  Patient found HOB elevated with oxygen, telemetry, PureWick upon OT entry to room.    General Precautions: Standard, fall  Orthopedic Precautions: N/A  Braces: N/A  Respiratory Status: Nasal cannula, flow 2 L/min    Occupational Performance:    Bed Mobility:    Patient completed Scooting/Bridging with minimum assistance  Patient completed Supine to Sit with moderate assistance  Patient completed Sit to Supine with minimum assistance    Functional Mobility/Transfers:  Patient completed Sit <> Stand Transfer with minimum assistance  with  hand-held assist   Functional Mobility: Pt able to take 3 side-steps to her right towards HOB - sat down after those steps stating she was too tired to do more    Activities of Daily Living:  Grooming: set up    Lower Body Dressing: total assistance with socks    Cognitive/Visual Perceptual:  Cognitive/Psychosocial Skills:     -       Oriented to: Person, Place, Time - does not remember how/why she came to hospital  -       Follows Commands/attention:Easily distracted and  Follows one-step commands  -       Communication: clear/fluent  -       Memory: No Deficits noted  -       Safety awareness/insight to disability: impaired   -       Mood/Affect/Coping skills/emotional control: Tearful    Physical Exam:  Balance: -       sit = good; stand = required min A  Postural examination/scapula alignment:    -       Rounded shoulders  -       Forward head  -       Posterior pelvic tilt  Skin integrity: Visible skin intact  Edema:  None noted  Sensation: -       Intact  Dominant hand: -       right  Upper Extremity Range of Motion:   WFL in B UEs in all joints except for shoulders  Upper Extremity Strength:  fair in bilateral UEs   Strength:  fair in bilateral hands    AMPAC 6 Click ADL:  AMPAC Total Score: 15    Treatment & Education:  Pt completed ADLs and func mobility activities for tx session as noted above  Pt educated on role of OT and POC      Patient left HOB elevated with call button in reach    GOALS:   Multidisciplinary Problems       Occupational Therapy Goals          Problem: Occupational Therapy    Goal Priority Disciplines Outcome Interventions   Occupational Therapy Goal     OT, PT/OT Ongoing, Progressing    Description: Goals to be met by: 01/10/23     Patient will increase functional independence with ADLs by performing:    UE Dressing with Set-up Assistance.  LE Dressing with Minimal Assistance.  Grooming while EOB with Set-up Assistance.  Toileting from bedside commode with Minimal Assistance for hygiene and clothing management.   Toilet transfer to bedside commode with Stand-by Assistance.  Upper extremity exercise program 3 x10 reps per handout, with supervision.                         History:     Past Medical History:   Diagnosis Date    Acute congestive heart failure 02/10/2020    Anemia     Bilateral renal cysts     Cataract     Chronic LBP 7/26/2012    Chronic pain     CKD (chronic kidney disease), stage IV     Colon polyp 2013    COPD (chronic obstructive  pulmonary disease)     Dehydration     Encounter for blood transfusion     HTN (hypertension)     Lumbar spondylosis     Melanoma     of the lip    Metabolic bone disease     Migraines, neuralgic     Osteoporosis     Primary osteoarthritis of both knees     s/p Rt TKA    Pulmonary embolism with infarction     Seizures 1972    x1 only    Subdeltoid bursitis, L>R. 9/27/2012    Ulcer     Vitamin D deficiency disease          Past Surgical History:   Procedure Laterality Date    BLADDER SUSPENSION      CATARACT EXTRACTION  11/18/13    left eye    CERVICAL LAMINECTOMY      x3, fusion x1    COLONOSCOPY  2009    DECLOTTING OF ARTERIOVENOUS GRAFT Left 1/3/2022    Procedure: [id number];  Surgeon: Lindsey Louie MD;  Location: [facility] OR;  Service: Vascular;  Laterality: Left;    DECLOTTING OF ARTERIOVENOUS GRAFT Left 2/8/2022    Procedure: [id number];  Surgeon: Lindsey Louie MD;  Location: [facility] OR;  Service: Vascular;  Laterality: Left;    DECLOTTING OF ARTERIOVENOUS GRAFT Left 4/8/2022    Procedure: [id number];  Surgeon: Lindsey Louie MD;  Location: [facility] OR;  Service: Vascular;  Laterality: Left;    FISTULOGRAM N/A 2/27/2020    Procedure: Fistulogram;  Surgeon: Noe Benitez MD;  Location: [facility] CATH LAB/EP;  Service: Cardiology;  Laterality: N/A;    HYSTERECTOMY      JOINT REPLACEMENT  2001    total right knee     LUMBAR LAMINECTOMY      x 3, fusion x1    OOPHORECTOMY      PERIPHERAL ANGIOGRAPHY N/A 3/9/2020    Procedure: Peripheral angiography;  Surgeon: Jacinto Henriquez MD;  Location: [facility] CATH LAB/EP;  Service: Cardiology;  Laterality: N/A;    PLACEMENT OF ARTERIOVENOUS GRAFT Left 1/21/2020    Procedure: INSERTION, GRAFT, ARTERIOVENOUS;  Surgeon: Lindsey Louie MD;  Location: [facility] OR;  Service: General;  Laterality: Left;    PLACEMENT OF ARTERIOVENOUS GRAFT Right 7/22/2022    Procedure: INSERTION, GRAFT, ARTERIOVENOUS;  Surgeon: Lindsey Louie MD;  Location: [facility]  OR;  Service: General;  Laterality: Right;    PLACEMENT OF DUAL-LUMEN VASCULAR CATHETER Right 4/16/2022    Procedure: INSERTION-CATHETER-RICKI;  Surgeon: Lindsey Louie MD;  Location: [facility] OR;  Service: General;  Laterality: Right;    THROMBECTOMY Left 2/3/2020    Procedure: THROMBECTOMY;  Surgeon: Lindsey Louie MD;  Location: [facility] OR;  Service: General;  Laterality: Left;    THROMBECTOMY  3/9/2020    Procedure: Thrombectomy;  Surgeon: Jacinto Henriquez MD;  Location: [facility] CATH LAB/EP;  Service: Cardiology;;    THROMBECTOMY Left 4/7/2022    Procedure: THROMBECTOMY;  Surgeon: Lindsey Louie MD;  Location: [facility] OR;  Service: General;  Laterality: Left;       Time Tracking:     OT Date of Treatment: 12/10/22  OT Start Time: 1105  OT Stop Time: 1127  OT Total Time (min): 22 min    Billable Minutes:Evaluation 10  Therapeutic Activity 12    12/10/2022

## 2022-12-10 NOTE — SUBJECTIVE & OBJECTIVE
Interval History: pt awake alert and no acute distress. Afebrile. Complains of feeling tired. She states she did not recal being lethargic yesterday. She is fully awake alert and oriented x 3 and moves all extremities to command, Asked if she can go home today. Informed here that she may be improving but not ready for dc yet . She was agreeable. She is aware of her advanced lung disease. She is not tachypneic either currently    Review of Systems   Constitutional:  Positive for fatigue. Negative for activity change and fever.   Respiratory:  Positive for shortness of breath. Negative for cough.    Cardiovascular:  Negative for chest pain and leg swelling.   Gastrointestinal:  Negative for abdominal pain, nausea and vomiting.   All other systems reviewed and are negative.  Objective:     Vital Signs (Most Recent):  Temp: 97.1 °F (36.2 °C) (12/10/22 0742)  Pulse: 93 (12/10/22 0742)  Resp: 18 (12/10/22 0742)  BP: (!) 145/73 (12/10/22 0742)  SpO2: 98 % (12/10/22 0746)   Vital Signs (24h Range):  Temp:  [97.1 °F (36.2 °C)-99.6 °F (37.6 °C)] 97.1 °F (36.2 °C)  Pulse:  [] 93  Resp:  [18-37] 18  SpO2:  [86 %-99 %] 98 %  BP: (128-177)/(65-78) 145/73     Weight: 40 kg (88 lb 1.5 oz)  Body mass index is 16.11 kg/m².    Intake/Output Summary (Last 24 hours) at 12/10/2022 0805  Last data filed at 12/9/2022 2231  Gross per 24 hour   Intake 300 ml   Output --   Net 300 ml      Physical Exam  Constitutional:       General: She is not in acute distress.     Comments: frail   Cardiovascular:      Rate and Rhythm: Normal rate and regular rhythm.   Pulmonary:      Effort: Pulmonary effort is normal.      Breath sounds: Normal breath sounds.   Abdominal:      General: Bowel sounds are normal.      Tenderness: There is no abdominal tenderness.   Neurological:      General: No focal deficit present.      Mental Status: She is alert and oriented to person, place, and time.      Comments: Gen weakness+   Psychiatric:         Mood and  Affect: Mood normal.       Significant Labs: All pertinent labs within the past 24 hours have been reviewed.    Significant Imaging: I have reviewed all pertinent imaging results/findings within the past 24 hours.

## 2022-12-10 NOTE — PROGRESS NOTES
12/10/22 0356   Patient Request   Patient Requested AI alert   Nurse Notification   Charge Nurse Notified? Yes   Name of Charge Nurse DANIEL Ness   Bedside Nurse Notified? Yes   Name of Bedside Nurse EDGAR Bateman   Nurse Notfication Method Secure Chat   Provider Notification   Provider Notified? Yes   Name of Provider Dr. Gudino   Provider Notification Method Secure Chat

## 2022-12-10 NOTE — NURSING
Pt arrived to unit  lethargic, responding to pain,  with tachypnea. On call MD notified. No new order at this time .

## 2022-12-10 NOTE — AI DETERIORATION ALERT
Artificial Intelligence Notification  Joseline      Admit Date: 2022  LOS: 0  Code Status: Full Code   Date of Consult: 12/10/2022  : 1945  Age: 77 y.o.  Weight:   Wt Readings from Last 1 Encounters:   22 39.9 kg (88 lb)     Sex: female  Bed: K461/K461 A:   MRN: [id number]  Attending Physician: Moses Hernández, *  Primary Service: Networked reference to record PCT   Time AI Alert Received: 3:50 am  Time at Bedside: 4:20 am           Report from Rns due to abnormal vital signs documented earlier. Re-check on vital signs were with T 97.5, HR 96, RR 34, 94% on 2L and /67 which is improved.       Vital Signs (Most Recent):  Temp: 97.7 °F (36.5 °C) (12/10/22 0012)  Pulse: 97 (12/10/22 0012)  Resp: (!) 32 (12/10/22 0012)  BP: 131/65 (12/10/22 0012)  SpO2: (!) 94 % (12/10/22 0012)   Vital Signs (24h Range):  Temp:  [97.7 °F (36.5 °C)-99.6 °F (37.6 °C)] 97.7 °F (36.5 °C)  Pulse:  [] 97  Resp:  [22-37] 32  SpO2:  [86 %-99 %] 94 %  BP: (128-177)/(65-78) 131/65         This encounter was triggered by an Artificial Intelligence Notification.     Artificial Intelligence alert discussed with Primary team:  Name Dr. Tristan Gudino, nocturnal hospitalist       Evaluation:     Went to evaluate patient and speak with RN further. Pt looks comfortable, but she is somewhat tachypneic.  She looks frail.  Reviewed chart she has end-stage COPD.  She has some increased lethargy per RN reports.  Reviewed recent ABGs, which were improving.      Disposition:     Check another ABG given altered mentation.  Provide nebulizer  treatment. If CO2 level trending back-up and pt still tachypneic, will consider to start the patient with BIPAP therapy if need be.  Of note, she looks quite ill and frail with end-stage COPD, consideration to attendings in daytime to do palliative care consult and discuss code status. Will relay. If she further has any deterioration in the night, will try to contact family to discuss  code status myself as well.

## 2022-12-10 NOTE — PROGRESS NOTES
[facility] Medicine  Progress Note    Patient Name: Katie Quevedo  MRN: [id number]  Patient Class: OP- Observation   Admission Date: 12/9/2022  Length of Stay: 0 days  Attending Physician: Emma Hansen MD  Primary Care Provider: Kingston Verduzco MD        Subjective:     Principal Problem:Acute on chronic respiratory failure with hypoxia        HPI:  77 y.o. female who has a past medical history of Acute congestive heart failure, Anemia, Bilateral renal cysts, Cataract, Chronic LBP, Chronic pain, Lumbar spondylosis, CKD stage IV, Colon polyp, COPD, Dehydration, HTN,  Melanoma, Metabolic bone disease, Migraines, neuralgic, Osteoporosis, Primary osteoarthritis of both knees, Pulmonary embolism with infarction, Seizures, Subdeltoid bursitis, L>R, Ulcer, and Vitamin D deficiency disease.  She presents to the ED due to Shortness of Breath. She is a dialysis patient on 3L NC of O2 with last dialysis today.  Reports Shortness of Breath that started three days ago with associated cough and congestion. Patient reports they were able to get 1.8 fluids when she usually gets 1.5 out. She denies chest pain, abdominal pain, vomiting, diarrhea, dysuria, fever, and other associated factors. No known sick contacts recently.  No alleviating factors reported. ED findings: Chest xray showing chronic appearing interstitial findings, increased parenchymal attenuation projects over the bilateral lower lung zones, also similar to the previous examination noting small left pleural effusion. Hypokalemia, elevated CRP, , procal 0.31, covid negative, flu negative, u/a pending, blood cultures pending.  Patient being admitted to hospital medicine observation for further medical management of Acute on chronic respiratory failure, COPD exacerbation and possible developing pneumonia.       Overview/Hospital Course:  No notes on file    Interval History: pt awake alert and no acute distress. Afebrile. Complains of feeling  tired. She states she did not recal being lethargic yesterday. She is fully awake alert and oriented x 3 and moves all extremities to command, Asked if she can go home today. Informed here that she may be improving but not ready for dc yet . She was agreeable. She is aware of her advanced lung disease. She is not tachypneic either currently    Review of Systems   Constitutional:  Positive for fatigue. Negative for activity change and fever.   Respiratory:  Positive for shortness of breath. Negative for cough.    Cardiovascular:  Negative for chest pain and leg swelling.   Gastrointestinal:  Negative for abdominal pain, nausea and vomiting.   All other systems reviewed and are negative.  Objective:     Vital Signs (Most Recent):  Temp: 97.1 °F (36.2 °C) (12/10/22 0742)  Pulse: 93 (12/10/22 0742)  Resp: 18 (12/10/22 0742)  BP: (!) 145/73 (12/10/22 0742)  SpO2: 98 % (12/10/22 0746)   Vital Signs (24h Range):  Temp:  [97.1 °F (36.2 °C)-99.6 °F (37.6 °C)] 97.1 °F (36.2 °C)  Pulse:  [] 93  Resp:  [18-37] 18  SpO2:  [86 %-99 %] 98 %  BP: (128-177)/(65-78) 145/73     Weight: 40 kg (88 lb 1.5 oz)  Body mass index is 16.11 kg/m².    Intake/Output Summary (Last 24 hours) at 12/10/2022 0805  Last data filed at 12/9/2022 2231  Gross per 24 hour   Intake 300 ml   Output --   Net 300 ml      Physical Exam  Constitutional:       General: She is not in acute distress.     Comments: frail   Cardiovascular:      Rate and Rhythm: Normal rate and regular rhythm.   Pulmonary:      Effort: Pulmonary effort is normal.      Breath sounds: Normal breath sounds.   Abdominal:      General: Bowel sounds are normal.      Tenderness: There is no abdominal tenderness.   Neurological:      General: No focal deficit present.      Mental Status: She is alert and oriented to person, place, and time.      Comments: Gen weakness+   Psychiatric:         Mood and Affect: Mood normal.       Significant Labs: All pertinent labs within the past 24  hours have been reviewed.    Significant Imaging: I have reviewed all pertinent imaging results/findings within the past 24 hours.      Assessment/Plan:      * Acute on chronic respiratory failure with hypoxia  CAP (community acquired pneumonia)  Severe stage 4 COPD with acute exacerbation due to CAP    Continuous cardiac monitoring  Oxygen  duonebs  Methylprednisolone IV..SWITCH TO ORAL taper on dc  Patient on trilegy inhaler at home, will resume on discharge   -Oxygen Supplementation if necessary  -Continue HD management of heart failure     CAP (community acquired pneumonia)  -poa  -improving clicincally  -afebrile  -no leucocytosis  -CXR on 12/9/22=1. Chronic appearing interstitial findings.  Increased parenchymal attenuation projects over the bilateral lower lung zones, also similar to the previous examination noting small left pleural effusion.  Correlation with any history of COPD/emphysema.  -Blood cx x 2 on 12/9/22=pending  -Nebs  -IS q2 hr when awake  -zithromax daily x 5 days. Ordered on 12/10/22  -Rocephin daily x 5 days. Ordered on 12/10/22      Physical deconditioning  -fall precautions  -PT OT      Hypokalemia  -monitor and replace prn  -Patient with ESRD        Adjustment reaction with anxiety and depression  hold buspirone and cymbalta due to lethargy      severe stage 4 COPD  -as above  -op f/u with PULM      ESRD (end stage renal disease) on dialysis  On HD MWF  Strict I&O  Avoid nephrotoxic agents  Renally dose meds  Consulted nephrology =follow recs  Continue allopurinol and phosphate binder       Chronic diastolic heart failure  No acute exacerbation  Continuous cardiac monitoring   Continue HD fluid management   Most recent Echo with EF 75%  Daily Weight              Essential hypertension  -Monitor BP=stable range  -Cover with hydralazine 10mg iv prn for SBP> 160  -Adjust BP meds prn      Chronic pain  Cover with pain meds prn       VTE Risk Mitigation (From admission, onward)         Ordered      heparin (porcine) injection 5,000 Units  As needed (PRN)         12/10/22 0753     IP VTE HIGH RISK PATIENT  Once         12/09/22 2034     Place sequential compression device  Until discontinued         12/09/22 2034                Discharge Planning   LAITH:      Code Status: Full Code   Is the patient medically ready for discharge?:     Reason for patient still in hospital (select all that apply): Treatment                     Emma Hansen MD  Department of [facility]   [facility]

## 2022-12-10 NOTE — ASSESSMENT & PLAN NOTE
Strict I&O  Avoid nephrotoxic agents  Renally dose meds  Consult nephrology   Continue allopurinol and phosphate binder

## 2022-12-10 NOTE — ASSESSMENT & PLAN NOTE
On HD MWF  Strict I&O  Avoid nephrotoxic agents  Renally dose meds  Consulted nephrology =follow recs  Continue allopurinol and phosphate binder

## 2022-12-10 NOTE — ASSESSMENT & PLAN NOTE
No acute exacerbation  Continuous cardiac monitoring   Continue HD fluid management   Most recent Echo with EF 75%  Daily Weight

## 2022-12-10 NOTE — ED NOTES
Patient unable to urinate at this time. Patient educated on need for urine sample and instructed to call RN when able to urinate. Patient refused catheter.

## 2022-12-10 NOTE — PLAN OF CARE
Problem: Occupational Therapy  Goal: Occupational Therapy Goal  Description: Goals to be met by: 01/10/23     Patient will increase functional independence with ADLs by performing:    UE Dressing with Set-up Assistance.  LE Dressing with Minimal Assistance.  Grooming while EOB with Set-up Assistance.  Toileting from bedside commode with Minimal Assistance for hygiene and clothing management.   Toilet transfer to bedside commode with Stand-by Assistance.  Upper extremity exercise program 3 x10 reps per handout, with supervision.    Outcome: Ongoing, Progressing     Pt was agreeable to and participated in OT evaluation.  Participate in OT evaluation was limited due to pt's weakness and impaired endurance at the time.  Pt reports that she ambulated within her home while holding onto furniture and walls, but used a rollator or scooter in the community.  Pt currently required min-mod A with bed mobility and 3 side-steps next to bed (to weak for more).  She reports that her PLOF was mod I with mobility and set-up to min A with bathing and dressing (lower body).  Goals established to assist pt with returning to PLOF regarding ADLs and func mobility.  Pt will benefit from skilled OT services in order to increase her level of safety and independence with ADLs and mobility.      Bridget Forbes, OT  12/10/2022

## 2022-12-10 NOTE — ASSESSMENT & PLAN NOTE
CAP (community acquired pneumonia)  Severe stage 4 COPD with acute exacerbation due to CAP    Continuous cardiac monitoring  Oxygen  duonebs  Methylprednisolone IV..SWITCH TO ORAL taper on dc  Patient on trilegy inhaler at home, will resume on discharge   -Oxygen Supplementation if necessary  -Continue HD management of heart failure

## 2022-12-10 NOTE — PT/OT/SLP PROGRESS
Physical Therapy  Not Seen    Patient Name:  Katie Quevedo   MRN:  [id number]    Patient not seen today secondary to Patient fatigue. Will follow-up as schedule allows.

## 2022-12-10 NOTE — ASSESSMENT & PLAN NOTE
CAP (community acquired pneumonia)  Severe stage 4 COPD    Continuous cardiac monitoring  Oxygen  duonebs  methylprednisolone  Azithromycin and ceftriaxone   Patient on trilegy inhaler at home, will resume on discharge   -Oxygen Supplementation if necessary  -Continue HD management of heart failure

## 2022-12-10 NOTE — PLAN OF CARE
Problem: Adult Inpatient Plan of Care  Goal: Plan of Care Review  Outcome: Ongoing, Progressing   Admission completed with old chart.  Patient unable to answer at this time.  Initiated care plan.  Chart reviewed.

## 2022-12-10 NOTE — ASSESSMENT & PLAN NOTE
Continuous cardiac monitoring   Continue HD fluid management   Most recent Echo with EF 75%  Daily Weight  Oxygen

## 2022-12-10 NOTE — HPI
77 y.o. female who has a past medical history of Acute congestive heart failure, Anemia, Bilateral renal cysts, Cataract, Chronic LBP, Chronic pain, Lumbar spondylosis, CKD stage IV, Colon polyp, COPD, Dehydration, HTN,  Melanoma, Metabolic bone disease, Migraines, neuralgic, Osteoporosis, Primary osteoarthritis of both knees, Pulmonary embolism with infarction, Seizures, Subdeltoid bursitis, L>R, Ulcer, and Vitamin D deficiency disease.  She presents to the ED due to Shortness of Breath. She is a dialysis patient on 3L NC of O2 with last dialysis today.  Reports Shortness of Breath that started three days ago with associated cough and congestion. Patient reports they were able to get 1.8 fluids when she usually gets 1.5 out. She denies chest pain, abdominal pain, vomiting, diarrhea, dysuria, fever, and other associated factors. No known sick contacts recently.  No alleviating factors reported. ED findings: Chest xray showing chronic appearing interstitial findings, increased parenchymal attenuation projects over the bilateral lower lung zones, also similar to the previous examination noting small left pleural effusion. Hypokalemia, elevated CRP, , procal 0.31, covid negative, flu negative, u/a pending, blood cultures pending.  Patient being admitted to hospital medicine observation for further medical management of Acute on chronic respiratory failure, COPD exacerbation and possible developing pneumonia.

## 2022-12-10 NOTE — ED NOTES
Pt relates unable to produce urine specimen at this time; pt initially accepts in/out cath for specimen, but refuses catheterization after cleaning.  Pt aware urine spec needed for Tx.

## 2022-12-10 NOTE — H&P
[facility] Emergency Dept  [facility]  History & Physical    Patient Name: Katie Quevedo  MRN: [id number]  Patient Class: OP- Observation  Admission Date: 12/9/2022  Attending Physician: Moses Hernández, *   Primary Care Provider: Kingston Verduzco MD         Patient information was obtained from patient, caregiver / friend, past medical records and ER records.     Subjective:     Principal Problem:Acute on chronic respiratory failure with hypoxia    Chief Complaint:   Chief Complaint   Patient presents with    Shortness of Breath     Pt c/o of SOB x3 days. Dialysis pt and last dialysis today. Congested cough and on3L NC. Distress noted and tachypnea+.         HPI: 77 y.o. female who has a past medical history of Acute congestive heart failure, Anemia, Bilateral renal cysts, Cataract, Chronic LBP, Chronic pain, Lumbar spondylosis, CKD stage IV, Colon polyp, COPD, Dehydration, HTN,  Melanoma, Metabolic bone disease, Migraines, neuralgic, Osteoporosis, Primary osteoarthritis of both knees, Pulmonary embolism with infarction, Seizures, Subdeltoid bursitis, L>R, Ulcer, and Vitamin D deficiency disease.  She presents to the ED due to Shortness of Breath. She is a dialysis patient on 3L NC of O2 with last dialysis today.  Reports Shortness of Breath that started three days ago with associated cough and congestion. Patient reports they were able to get 1.8 fluids when she usually gets 1.5 out. She denies chest pain, abdominal pain, vomiting, diarrhea, dysuria, fever, and other associated factors. No known sick contacts recently.  No alleviating factors reported. ED findings: Chest xray showing chronic appearing interstitial findings, increased parenchymal attenuation projects over the bilateral lower lung zones, also similar to the previous examination noting small left pleural effusion. Hypokalemia, elevated CRP, , procal 0.31, covid negative, flu negative, u/a pending, blood cultures pending.  Patient being  admitted to hospital medicine observation for further medical management of Acute on chronic respiratory failure, COPD exacerbation and possible developing pneumonia.       Past Medical History:   Diagnosis Date    Acute congestive heart failure 02/10/2020    Anemia     Bilateral renal cysts     Cataract     Chronic LBP 7/26/2012    Chronic pain     CKD (chronic kidney disease), stage IV     Colon polyp 2013    COPD (chronic obstructive pulmonary disease)     Dehydration     Encounter for blood transfusion     HTN (hypertension)     Lumbar spondylosis     Melanoma     of the lip    Metabolic bone disease     Migraines, neuralgic     Osteoporosis     Primary osteoarthritis of both knees     s/p Rt TKA    Pulmonary embolism with infarction     Seizures 1972    x1 only    Subdeltoid bursitis, L>R. 9/27/2012    Ulcer     Vitamin D deficiency disease        Past Surgical History:   Procedure Laterality Date    BLADDER SUSPENSION      CATARACT EXTRACTION  11/18/13    left eye    CERVICAL LAMINECTOMY      x3, fusion x1    COLONOSCOPY  2009    DECLOTTING OF ARTERIOVENOUS GRAFT Left 1/3/2022    Procedure: [id number];  Surgeon: Lindsey Louie MD;  Location: [facility] OR;  Service: Vascular;  Laterality: Left;    DECLOTTING OF ARTERIOVENOUS GRAFT Left 2/8/2022    Procedure: [id number];  Surgeon: Lindsey Louie MD;  Location: [facility] OR;  Service: Vascular;  Laterality: Left;    DECLOTTING OF ARTERIOVENOUS GRAFT Left 4/8/2022    Procedure: [id number];  Surgeon: Lindsey Louie MD;  Location: [facility] OR;  Service: Vascular;  Laterality: Left;    FISTULOGRAM N/A 2/27/2020    Procedure: Fistulogram;  Surgeon: Noe Benitez MD;  Location: [facility] CATH LAB/EP;  Service: Cardiology;  Laterality: N/A;    HYSTERECTOMY      JOINT REPLACEMENT  2001    total right knee     LUMBAR LAMINECTOMY      x 3, fusion x1    OOPHORECTOMY      PERIPHERAL ANGIOGRAPHY N/A 3/9/2020    Procedure: Peripheral angiography;   Surgeon: Jacinto Henriquez MD;  Location: [facility] CATH LAB/EP;  Service: Cardiology;  Laterality: N/A;    PLACEMENT OF ARTERIOVENOUS GRAFT Left 1/21/2020    Procedure: INSERTION, GRAFT, ARTERIOVENOUS;  Surgeon: Lindsey Louie MD;  Location: [facility] OR;  Service: General;  Laterality: Left;    PLACEMENT OF ARTERIOVENOUS GRAFT Right 7/22/2022    Procedure: INSERTION, GRAFT, ARTERIOVENOUS;  Surgeon: Lindsey Louie MD;  Location: [facility] OR;  Service: General;  Laterality: Right;    PLACEMENT OF DUAL-LUMEN VASCULAR CATHETER Right 4/16/2022    Procedure: INSERTION-CATHETER-RICKI;  Surgeon: Lindsey Louie MD;  Location: [facility] OR;  Service: General;  Laterality: Right;    THROMBECTOMY Left 2/3/2020    Procedure: THROMBECTOMY;  Surgeon: Lindsey Louie MD;  Location: [facility] OR;  Service: General;  Laterality: Left;    THROMBECTOMY  3/9/2020    Procedure: Thrombectomy;  Surgeon: Jacinto Henriquez MD;  Location: [facility] CATH LAB/EP;  Service: Cardiology;;    THROMBECTOMY Left 4/7/2022    Procedure: THROMBECTOMY;  Surgeon: Lindsey Louie MD;  Location: [facility] OR;  Service: General;  Laterality: Left;       Review of patient's allergies indicates:   Allergen Reactions    Aspirin      Other reaction(s): hx of ulcers    Tetracycline Swelling     Other reaction(s): Swelling    Penicillins Rash     Other reaction(s): Hives  Other reaction(s): Rash  Other reaction(s): Rash  Other reaction(s): Hives       No current facility-administered medications on file prior to encounter.     Current Outpatient Medications on File Prior to Encounter   Medication Sig    acetaminophen (TYLENOL) 325 MG tablet Take 1 tablet (325 mg total) by mouth every 6 (six) hours as needed for Pain.    albuterol (VENTOLIN HFA) 90 mcg/actuation inhaler Inhale 2 puffs into the lungs every 6 (six) hours as needed for Wheezing or Shortness of Breath. Rescue    albuterol-ipratropium (DUO-NEB) 2.5 mg-0.5 mg/3 mL nebulizer solution Take 3 mLs by  nebulization every 6 (six) hours as needed for Shortness of Breath. Rescue    allopurinoL (ZYLOPRIM) 100 MG tablet Take 1 tablet (100 mg total) by mouth on Tuesday, Thursday, Saturday, and Sunday.    amLODIPine (NORVASC) 5 MG tablet Take 5 mg by mouth once daily.    atorvastatin (LIPITOR) 80 MG tablet Take 80 mg by mouth once daily.    busPIRone (BUSPAR) 10 MG tablet Take 1 tablet (10 mg total) by mouth 2 (two) times daily.    dexAMETHasone (DECADRON) 4 mg/mL injection     diclofenac sodium (VOLTAREN) 1 % Gel Apply 2 g topically 3 (three) times daily.    diphenhydrAMINE (BENADRYL) 25 mg capsule Take 25 mg by mouth every 6 (six) hours as needed for Itching.    DULoxetine (CYMBALTA) 30 MG capsule Take 1 capsule (30 mg total) by mouth once daily.    epoetin hemant-epbx (RETACRIT) 10,000 unit/mL imjection Inject 0.5 mLs (5,000 Units total) into the skin every Mon, Wed, Fri.    fluticasone-umeclidin-vilanter (TRELEGY ELLIPTA) 200-62.5-25 mcg inhaler Inhale 1 puff into the lungs once daily.    heparin, porcine, PF, (HEPARIN FLUSH 100 UNITS/ML) 100 unit/mL Syrg     HYDROcodone-acetaminophen (NORCO)  mg per tablet Take 1 tablet by mouth 3 (three) times daily as needed for Pain.    LORazepam (ATIVAN) 0.5 MG tablet Take 1 tablet (0.5 mg total) by mouth every 12 (twelve) hours as needed for Anxiety.    megestroL (MEGACE) 40 MG Tab Take 40 mg by mouth once daily.    metoprolol tartrate (LOPRESSOR) 25 MG tablet Take 1 tablet (25 mg total) by mouth 2 (two) times daily.    ondansetron 4 mg/2 mL Soln     sevelamer carbonate (RENVELA) 800 mg Tab Take 1 tablet (800 mg total) by mouth after meals as needed.     Family History       Problem Relation (Age of Onset)    Arthritis Mother    Cancer Father    Cataracts Father, Sister    Diabetes Maternal Aunt    Glaucoma Cousin    Heart attack Maternal Grandfather    Heart disease Maternal Grandfather    Hypertension Father, Maternal Grandfather    Stroke Mother          Tobacco Use     Smoking status: Former     Types: Cigarettes    Smokeless tobacco: Former     Quit date: 2/3/2015   Substance and Sexual Activity    Alcohol use: Not Currently     Comment: Rare    Drug use: No    Sexual activity: Never     Partners: Male     Review of Systems   Constitutional:  Negative for chills and fever.   HENT:  Positive for congestion.    Respiratory:  Positive for cough and shortness of breath.    Cardiovascular:  Negative for chest pain.   Gastrointestinal:  Negative for abdominal pain, constipation, nausea and vomiting.   Musculoskeletal:  Negative for arthralgias and myalgias.   Skin:  Negative for color change.   Neurological:  Positive for weakness. Negative for dizziness and headaches.   Hematological:  Does not bruise/bleed easily.   Objective:     Vital Signs (Most Recent):  Temp: 99 °F (37.2 °C) (12/09/22 2000)  Pulse: 106 (12/09/22 2000)  Resp: (!) 28 (12/09/22 2000)  BP: 133/78 (12/09/22 2000)  SpO2: 97 % (12/09/22 2000)   Vital Signs (24h Range):  Temp:  [98.6 °F (37 °C)-99 °F (37.2 °C)] 99 °F (37.2 °C)  Pulse:  [104-115] 106  Resp:  [22-28] 28  SpO2:  [86 %-99 %] 97 %  BP: (133-177)/(65-78) 133/78     Weight: 39.9 kg (88 lb)  Body mass index is 16.1 kg/m².    Physical Exam  Constitutional:       Appearance: She is ill-appearing.   HENT:      Head: Normocephalic and atraumatic.      Nose: No congestion or rhinorrhea.      Mouth/Throat:      Mouth: Mucous membranes are moist.   Cardiovascular:      Rate and Rhythm: Tachycardia present.   Pulmonary:      Effort: No respiratory distress.   Abdominal:      General: Bowel sounds are normal.      Palpations: Abdomen is soft.   Musculoskeletal:         General: No deformity.      Cervical back: Normal range of motion.   Skin:     General: Skin is warm and dry.   Neurological:      Mental Status: She is lethargic.           Significant Labs: All pertinent labs within the past 24 hours have been reviewed.    Significant Imaging: I have reviewed all  pertinent imaging results/findings within the past 24 hours.    Assessment/Plan:     * Acute on chronic respiratory failure with hypoxia  CAP (community acquired pneumonia)  Severe stage 4 COPD    Continuous cardiac monitoring  Oxygen  duonebs  methylprednisolone  Azithromycin and ceftriaxone   Patient on trilegy inhaler at home, will resume on discharge   -Oxygen Supplementation if necessary  -Continue HD management of heart failure     Hypokalemia  K+ 3.3  Patient with ESRD  Monitor labs      Adjustment reaction with anxiety and depression    hold buspirone and cymbalta due to lethargy      ESRD (end stage renal disease) on dialysis  Strict I&O  Avoid nephrotoxic agents  Renally dose meds  Consult nephrology   Continue allopurinol and phosphate binder       Chronic diastolic heart failure  Continuous cardiac monitoring   Continue HD fluid management   Most recent Echo with EF 75%  Daily Weight  Oxygen             Chronic pain  Patient lethargic on exam  Will order pain meds prn         VTE Risk Mitigation (From admission, onward)           Ordered     IP VTE HIGH RISK PATIENT  Once         12/09/22 2034     Place sequential compression device  Until discontinued         12/09/22 2034                       Kandy Man NP  Department of Hospital Medicine   [place] - Emergency Dept

## 2022-12-10 NOTE — SUBJECTIVE & OBJECTIVE
Past Medical History:   Diagnosis Date    Acute congestive heart failure 02/10/2020    Anemia     Bilateral renal cysts     Cataract     Chronic LBP 7/26/2012    Chronic pain     CKD (chronic kidney disease), stage IV     Colon polyp 2013    COPD (chronic obstructive pulmonary disease)     Dehydration     Encounter for blood transfusion     HTN (hypertension)     Lumbar spondylosis     Melanoma     of the lip    Metabolic bone disease     Migraines, neuralgic     Osteoporosis     Primary osteoarthritis of both knees     s/p Rt TKA    Pulmonary embolism with infarction     Seizures 1972    x1 only    Subdeltoid bursitis, L>R. 9/27/2012    Ulcer     Vitamin D deficiency disease        Past Surgical History:   Procedure Laterality Date    BLADDER SUSPENSION      CATARACT EXTRACTION  11/18/13    left eye    CERVICAL LAMINECTOMY      x3, fusion x1    COLONOSCOPY  2009    DECLOTTING OF ARTERIOVENOUS GRAFT Left 1/3/2022    Procedure: [id number];  Surgeon: Lindsey Louie MD;  Location: [facility] OR;  Service: Vascular;  Laterality: Left;    DECLOTTING OF ARTERIOVENOUS GRAFT Left 2/8/2022    Procedure: [id number];  Surgeon: Lindsey Louie MD;  Location: [facility] OR;  Service: Vascular;  Laterality: Left;    DECLOTTING OF ARTERIOVENOUS GRAFT Left 4/8/2022    Procedure: [id number];  Surgeon: Lindsey Louie MD;  Location: [facility] OR;  Service: Vascular;  Laterality: Left;    FISTULOGRAM N/A 2/27/2020    Procedure: Fistulogram;  Surgeon: Noe Benitez MD;  Location: [facility] CATH LAB/EP;  Service: Cardiology;  Laterality: N/A;    HYSTERECTOMY      JOINT REPLACEMENT  2001    total right knee     LUMBAR LAMINECTOMY      x 3, fusion x1    OOPHORECTOMY      PERIPHERAL ANGIOGRAPHY N/A 3/9/2020    Procedure: Peripheral angiography;  Surgeon: Jacinto Henriquez MD;  Location: [facility] CATH LAB/EP;  Service: Cardiology;  Laterality: N/A;    PLACEMENT OF ARTERIOVENOUS GRAFT Left 1/21/2020    Procedure:  INSERTION, GRAFT, ARTERIOVENOUS;  Surgeon: Lindsey Louie MD;  Location: [facility] OR;  Service: General;  Laterality: Left;    PLACEMENT OF ARTERIOVENOUS GRAFT Right 7/22/2022    Procedure: INSERTION, GRAFT, ARTERIOVENOUS;  Surgeon: Lindsey Louie MD;  Location: [facility] OR;  Service: General;  Laterality: Right;    PLACEMENT OF DUAL-LUMEN VASCULAR CATHETER Right 4/16/2022    Procedure: INSERTION-CATHETER-RICKI;  Surgeon: Lindsey Louie MD;  Location: [facility] OR;  Service: General;  Laterality: Right;    THROMBECTOMY Left 2/3/2020    Procedure: THROMBECTOMY;  Surgeon: Lindsey Louie MD;  Location: [facility] OR;  Service: General;  Laterality: Left;    THROMBECTOMY  3/9/2020    Procedure: Thrombectomy;  Surgeon: Jacinto Henriquez MD;  Location: [facility] CATH LAB/EP;  Service: Cardiology;;    THROMBECTOMY Left 4/7/2022    Procedure: THROMBECTOMY;  Surgeon: Lindsey Louie MD;  Location: [facility] OR;  Service: General;  Laterality: Left;       Review of patient's allergies indicates:   Allergen Reactions    Aspirin      Other reaction(s): hx of ulcers    Tetracycline Swelling     Other reaction(s): Swelling    Penicillins Rash     Other reaction(s): Hives  Other reaction(s): Rash  Other reaction(s): Rash  Other reaction(s): Hives       No current facility-administered medications on file prior to encounter.     Current Outpatient Medications on File Prior to Encounter   Medication Sig    acetaminophen (TYLENOL) 325 MG tablet Take 1 tablet (325 mg total) by mouth every 6 (six) hours as needed for Pain.    albuterol (VENTOLIN HFA) 90 mcg/actuation inhaler Inhale 2 puffs into the lungs every 6 (six) hours as needed for Wheezing or Shortness of Breath. Rescue    albuterol-ipratropium (DUO-NEB) 2.5 mg-0.5 mg/3 mL nebulizer solution Take 3 mLs by nebulization every 6 (six) hours as needed for Shortness of Breath. Rescue    allopurinoL (ZYLOPRIM) 100 MG tablet Take 1 tablet (100 mg total) by mouth on Tuesday, Thursday,  Saturday, and Sunday.    amLODIPine (NORVASC) 5 MG tablet Take 5 mg by mouth once daily.    atorvastatin (LIPITOR) 80 MG tablet Take 80 mg by mouth once daily.    busPIRone (BUSPAR) 10 MG tablet Take 1 tablet (10 mg total) by mouth 2 (two) times daily.    dexAMETHasone (DECADRON) 4 mg/mL injection     diclofenac sodium (VOLTAREN) 1 % Gel Apply 2 g topically 3 (three) times daily.    diphenhydrAMINE (BENADRYL) 25 mg capsule Take 25 mg by mouth every 6 (six) hours as needed for Itching.    DULoxetine (CYMBALTA) 30 MG capsule Take 1 capsule (30 mg total) by mouth once daily.    epoetin hemant-epbx (RETACRIT) 10,000 unit/mL imjection Inject 0.5 mLs (5,000 Units total) into the skin every Mon, Wed, Fri.    fluticasone-umeclidin-vilanter (TRELEGY ELLIPTA) 200-62.5-25 mcg inhaler Inhale 1 puff into the lungs once daily.    heparin, porcine, PF, (HEPARIN FLUSH 100 UNITS/ML) 100 unit/mL Syrg     HYDROcodone-acetaminophen (NORCO)  mg per tablet Take 1 tablet by mouth 3 (three) times daily as needed for Pain.    LORazepam (ATIVAN) 0.5 MG tablet Take 1 tablet (0.5 mg total) by mouth every 12 (twelve) hours as needed for Anxiety.    megestroL (MEGACE) 40 MG Tab Take 40 mg by mouth once daily.    metoprolol tartrate (LOPRESSOR) 25 MG tablet Take 1 tablet (25 mg total) by mouth 2 (two) times daily.    ondansetron 4 mg/2 mL Soln     sevelamer carbonate (RENVELA) 800 mg Tab Take 1 tablet (800 mg total) by mouth after meals as needed.     Family History       Problem Relation (Age of Onset)    Arthritis Mother    Cancer Father    Cataracts Father, Sister    Diabetes Maternal Aunt    Glaucoma Cousin    Heart attack Maternal Grandfather    Heart disease Maternal Grandfather    Hypertension Father, Maternal Grandfather    Stroke Mother          Tobacco Use    Smoking status: Former     Types: Cigarettes    Smokeless tobacco: Former     Quit date: 2/3/2015   Substance and Sexual Activity    Alcohol use: Not Currently     Comment:  Rare    Drug use: No    Sexual activity: Never     Partners: Male     Review of Systems   Constitutional:  Negative for chills and fever.   HENT:  Positive for congestion.    Respiratory:  Positive for cough and shortness of breath.    Cardiovascular:  Negative for chest pain.   Gastrointestinal:  Negative for abdominal pain, constipation, nausea and vomiting.   Musculoskeletal:  Negative for arthralgias and myalgias.   Skin:  Negative for color change.   Neurological:  Positive for weakness. Negative for dizziness and headaches.   Hematological:  Does not bruise/bleed easily.   Objective:     Vital Signs (Most Recent):  Temp: 99 °F (37.2 °C) (12/09/22 2000)  Pulse: 106 (12/09/22 2000)  Resp: (!) 28 (12/09/22 2000)  BP: 133/78 (12/09/22 2000)  SpO2: 97 % (12/09/22 2000)   Vital Signs (24h Range):  Temp:  [98.6 °F (37 °C)-99 °F (37.2 °C)] 99 °F (37.2 °C)  Pulse:  [104-115] 106  Resp:  [22-28] 28  SpO2:  [86 %-99 %] 97 %  BP: (133-177)/(65-78) 133/78     Weight: 39.9 kg (88 lb)  Body mass index is 16.1 kg/m².    Physical Exam  Constitutional:       Appearance: She is ill-appearing.   HENT:      Head: Normocephalic and atraumatic.      Nose: No congestion or rhinorrhea.      Mouth/Throat:      Mouth: Mucous membranes are moist.   Cardiovascular:      Rate and Rhythm: Tachycardia present.   Pulmonary:      Effort: No respiratory distress.   Abdominal:      General: Bowel sounds are normal.      Palpations: Abdomen is soft.   Musculoskeletal:         General: No deformity.      Cervical back: Normal range of motion.   Skin:     General: Skin is warm and dry.   Neurological:      Mental Status: She is lethargic.           Significant Labs: All pertinent labs within the past 24 hours have been reviewed.    Significant Imaging: I have reviewed all pertinent imaging results/findings within the past 24 hours.

## 2022-12-10 NOTE — CONSULTS
Nephrology Consult  H&P      Consult Requested By: Emma Hansen MD  Reason for Consult:   SUBJECTIVE:     History of Present Illness:  Patient is a 77 y.o. female presents with past medical history of Acute congestive heart failure, Anemia, Bilateral renal cysts, Cataract, Chronic LBP, Chronic pain, Lumbar spondylosis, CKD stage IV, Colon polyp, COPD, Dehydration, HTN,  Melanoma, Metabolic bone disease, Migraines, neuralgic, Osteoporosis, Primary osteoarthritis of both knees, Pulmonary embolism with infarction, Seizures, Subdeltoid bursitis, L>R, Ulcer, and Vitamin D deficiency disease.  She presents to the ED due to Shortness of Breath. She is a dialysis patient on 3L NC of O2 with last dialysis today.  Reports Shortness of Breath that started three days ago with associated cough and congestion. Patient reports they were able to get 1.8 fluids when she usually gets 1.5 out. She denies chest pain, abdominal pain, vomiting, diarrhea, dysuria, fever, and other associated factors. No known sick contacts recently.  No alleviating factors reported. ED findings: Chest xray showing chronic appearing interstitial findings, increased parenchymal attenuation projects over the bilateral lower lung zones, also similar to the previous examination noting small left pleural effusion.    Review of Systems   Unable to perform ROS: Mental status change   Past Medical History:   Diagnosis Date    Acute congestive heart failure 02/10/2020    Anemia     Bilateral renal cysts     Cataract     Chronic LBP 7/26/2012    Chronic pain     CKD (chronic kidney disease), stage IV     Colon polyp 2013    COPD (chronic obstructive pulmonary disease)     Dehydration     Encounter for blood transfusion     HTN (hypertension)     Lumbar spondylosis     Melanoma     of the lip    Metabolic bone disease     Migraines, neuralgic     Osteoporosis     Primary osteoarthritis of both knees     s/p Rt TKA    Pulmonary embolism with infarction      Seizures 1972    x1 only    Subdeltoid bursitis, L>R. 9/27/2012    Ulcer     Vitamin D deficiency disease      Past Surgical History:   Procedure Laterality Date    BLADDER SUSPENSION      CATARACT EXTRACTION  11/18/13    left eye    CERVICAL LAMINECTOMY      x3, fusion x1    COLONOSCOPY  2009    DECLOTTING OF ARTERIOVENOUS GRAFT Left 1/3/2022    Procedure: [id number];  Surgeon: Lindsey Louie MD;  Location: [facility] OR;  Service: Vascular;  Laterality: Left;    DECLOTTING OF ARTERIOVENOUS GRAFT Left 2/8/2022    Procedure: [id number];  Surgeon: Lindsey Louie MD;  Location: [facility] OR;  Service: Vascular;  Laterality: Left;    DECLOTTING OF ARTERIOVENOUS GRAFT Left 4/8/2022    Procedure: [id number];  Surgeon: Lindsey Louie MD;  Location: [facility] OR;  Service: Vascular;  Laterality: Left;    FISTULOGRAM N/A 2/27/2020    Procedure: Fistulogram;  Surgeon: Noe Benitez MD;  Location: [facility] CATH LAB/EP;  Service: Cardiology;  Laterality: N/A;    HYSTERECTOMY      JOINT REPLACEMENT  2001    total right knee     LUMBAR LAMINECTOMY      x 3, fusion x1    OOPHORECTOMY      PERIPHERAL ANGIOGRAPHY N/A 3/9/2020    Procedure: Peripheral angiography;  Surgeon: Jacinto Henriquez MD;  Location: [facility] CATH LAB/EP;  Service: Cardiology;  Laterality: N/A;    PLACEMENT OF ARTERIOVENOUS GRAFT Left 1/21/2020    Procedure: INSERTION, GRAFT, ARTERIOVENOUS;  Surgeon: Lindsey Louie MD;  Location: [facility] OR;  Service: General;  Laterality: Left;    PLACEMENT OF ARTERIOVENOUS GRAFT Right 7/22/2022    Procedure: INSERTION, GRAFT, ARTERIOVENOUS;  Surgeon: Lindsey Louie MD;  Location: [facility] OR;  Service: General;  Laterality: Right;    PLACEMENT OF DUAL-LUMEN VASCULAR CATHETER Right 4/16/2022    Procedure: INSERTION-CATHETER-RICKI;  Surgeon: Lindsey Louie MD;  Location: [facility] OR;  Service: General;  Laterality: Right;    THROMBECTOMY Left 2/3/2020    Procedure: THROMBECTOMY;  Surgeon:  "Lindsey Louie MD;  Location: [facility] OR;  Service: General;  Laterality: Left;    THROMBECTOMY  3/9/2020    Procedure: Thrombectomy;  Surgeon: Jacinto Henriquez MD;  Location: [facility] CATH LAB/EP;  Service: Cardiology;;    THROMBECTOMY Left 4/7/2022    Procedure: THROMBECTOMY;  Surgeon: Lindsey Louie MD;  Location: [facility] OR;  Service: General;  Laterality: Left;     Family History   Problem Relation Age of Onset    Arthritis Mother     Stroke Mother     Hypertension Father     Cancer Father     Cataracts Father     Diabetes Maternal Aunt     Hypertension Maternal Grandfather     Heart disease Maternal Grandfather     Heart attack Maternal Grandfather     Cataracts Sister     Glaucoma Cousin      Social History     Tobacco Use    Smoking status: Former     Types: Cigarettes    Smokeless tobacco: Former     Quit date: 2/3/2015   Substance Use Topics    Alcohol use: Not Currently     Comment: Rare    Drug use: No     Review of patient's allergies indicates:   Allergen Reactions    Aspirin      Other reaction(s): hx of ulcers    Tetracycline Swelling     Other reaction(s): Swelling    Penicillins Rash     Other reaction(s): Hives  Other reaction(s): Rash  Other reaction(s): Rash  Other reaction(s): Hives      OBJECTIVE:     Vital Signs (Most Recent)  Vitals:    12/10/22 0012 12/10/22 0402 12/10/22 0418 12/10/22 0449   BP: 131/65 131/67 131/67    BP Location:       Patient Position: Lying Lying     Pulse: 97 96 96 95   Resp: (!) 32 (!) 34 (!) 34 (!) 26   Temp: 97.7 °F (36.5 °C) 97.5 °F (36.4 °C) 97.5 °F (36.4 °C)    TempSrc: Axillary Oral     SpO2: (!) 94% (!) 94% (!) 94% 95%   Weight:   40 kg (88 lb 1.5 oz)    Height:   5' 2" (1.575 m)        Medications:   allopurinoL  100 mg Oral Every Tues, Thurs, Sat, Sun    amLODIPine  5 mg Oral Daily    atorvastatin  80 mg Oral Daily    azithromycin  500 mg Intravenous Q24H    cefTRIAXone (ROCEPHIN) IVPB  1 g Intravenous Q24H    methylPREDNISolone sodium succinate injection "  80 mg Intravenous Q6H    metoprolol tartrate  25 mg Oral BID    sevelamer carbonate  800 mg Oral TID WM     Physical Exam  Constitutional:       General: She is not in acute distress.     Appearance: She is not diaphoretic.   HENT:      Head: Normocephalic and atraumatic.   Eyes:      General: No scleral icterus.  Neck:      Vascular: No JVD.   Cardiovascular:      Rate and Rhythm: Normal rate and regular rhythm.      Heart sounds: No murmur heard.    No friction rub.   Pulmonary:      Effort: Pulmonary effort is normal. No respiratory distress.      Breath sounds: Rales present. No wheezing.   Abdominal:      General: Bowel sounds are normal. There is no distension.      Palpations: Abdomen is soft.      Tenderness: There is no abdominal tenderness.   Musculoskeletal:      Cervical back: Neck supple.   Skin:     General: Skin is warm and dry.      Findings: No erythema or rash.   Neurological:      GCS: GCS eye subscore is 1. GCS verbal subscore is 4. GCS motor subscore is 5.      Motor: Atrophy present.   Psychiatric:         Mood and Affect: Affect normal.     Diagnostic Results:  X-Ray: Reviewed  US: Reviewed  Echo: Reviewed  ASSESSMENT/PLAN:   1. ESRD (N18.6 Z99.2) - HD MWF [place] With Dr Aura Diggs MWF, for 3 h  Completed yesterday, appears euvolemic, electrolytes at goal, no ind for HD today - plan for monday  Recent Labs   Lab 12/09/22  1757 12/10/22  0319    137   K 3.3* 3.7   BUN 5* 8   CREATININE 1.1 1.4     2. HTN (I10) - at goal  3. Anemia of chronic kidney disease treated with BILL (N18.9 D63.1) - EPogen 5k  with each HD  Recent Labs   Lab 12/09/22  1757 12/10/22  0319   HGB 9.2* 9.4*   HCT 30.4* 30.7*    213     Lab Results   Component Value Date    IRON 13 (L) 02/12/2020    TIBC 462 (H) 02/12/2020    FERRITIN 148 07/17/2019     4. 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 Acidosis  Recent Labs   Lab 12/09/22  1757 12/10/22  0319    102   CO2 28 26     6. Hemodialysis Access (Z99.2 V45.11)- R IJ tunneled CVC  7. Nutrition/Hypoalbuminemia (E88.09) -   Recent Labs   Lab 12/09/22  1757 12/10/22  0319   ALBUMIN 3.0* 2.7*     Nepro with meals TID. Renal vitamins daily        Thank you for allowing me to participate in care of your patient  With any question please call [phone]  Yadi Mitchell    [facility]  EDGARD Bustillos MD, NEIL SAMS MD,   MD CORKY Benoit MD E. V. Harmon, NP  [address]  [place], [address]  [phone]  After hours answering service: [phone]

## 2022-12-10 NOTE — ED NOTES
Report given to DANIEL Bateman using SBAR. All questions answered. Patient to be transported on tele monitor by PCT.

## 2022-12-10 NOTE — PLAN OF CARE
Problem: Adult Inpatient Plan of Care  Goal: Plan of Care Review  Outcome: Ongoing, Progressing  Goal: Patient-Specific Goal (Individualized)  Outcome: Ongoing, Progressing  Goal: Absence of Hospital-Acquired Illness or Injury  Outcome: Ongoing, Progressing  Goal: Optimal Comfort and Wellbeing  Outcome: Ongoing, Progressing  Goal: Readiness for Transition of Care  Outcome: Ongoing, Progressing     Problem: Fluid Imbalance (Pneumonia)  Goal: Fluid Balance  Outcome: Ongoing, Progressing     Problem: Infection (Pneumonia)  Goal: Resolution of Infection Signs and Symptoms  Outcome: Ongoing, Progressing     Problem: Respiratory Compromise (Pneumonia)  Goal: Effective Oxygenation and Ventilation  Outcome: Ongoing, Progressing     Problem: Fall Injury Risk  Goal: Absence of Fall and Fall-Related Injury  Outcome: Ongoing, Progressing     Problem: Skin Injury Risk Increased  Goal: Skin Health and Integrity  Outcome: Ongoing, Progressing     Problem: Device-Related Complication Risk (Hemodialysis)  Goal: Safe, Effective Therapy Delivery  Outcome: Ongoing, Progressing     Problem: Hemodynamic Instability (Hemodialysis)  Goal: Effective Tissue Perfusion  Outcome: Ongoing, Progressing     Problem: Infection (Hemodialysis)  Goal: Absence of Infection Signs and Symptoms  Outcome: Ongoing, Progressing     Problem: Electrolyte Imbalance (Chronic Kidney Disease)  Goal: Electrolyte Balance  Outcome: Ongoing, Progressing     Problem: Renal Function Impairment (Chronic Kidney Disease)  Goal: Minimize Renal Failure Effects  Outcome: Ongoing, Progressing     Problem: COPD (Chronic Obstructive Pulmonary Disease) Comorbidity  Goal: Maintenance of COPD Symptom Control  Outcome: Ongoing, Progressing     Problem: Heart Failure Comorbidity  Goal: Maintenance of Heart Failure Symptom Control  Outcome: Ongoing, Progressing     Problem: Hypertension Comorbidity  Goal: Blood Pressure in Desired Range  Outcome: Ongoing, Progressing     Problem:  Pain Chronic (Persistent) (Comorbidity Management)  Goal: Acceptable Pain Control and Functional Ability  Outcome: Ongoing, Progressing     Problem: Seizure Disorder Comorbidity  Goal: Maintenance of Seizure Control  Outcome: Ongoing, Progressing     The plan of care has been reviewed with patient and /or family at bedside, verbalized understanding compliance with plan of care noted. Pt education provided. All new and/or current orders completed.Time given/spent  for questions, comments, and /or concerns. No new complaints at this time unless otherwise noted. Safety measure implemented bed low, call bell  and personal belongings in reach, side rails x3 in use, protective environment maintained etc. Patient remains free from fall or injuries. Will continue to monitor patient throughout shift until following nurse assumes care.  ADDITIONAL INFORMATION:  Pt lethargic, unable to follow directions. Meds held due to lethargy. Md made aware. AI alert sent to team MD @ bedside , VS taken awaiting new orders.

## 2022-12-11 LAB
ALBUMIN SERPL BCP-MCNC: 2.6 G/DL (ref 3.5–5.2)
ALP SERPL-CCNC: 218 U/L (ref 55–135)
ALT SERPL W/O P-5'-P-CCNC: 16 U/L (ref 10–44)
ANION GAP SERPL CALC-SCNC: 14 MMOL/L (ref 8–16)
AST SERPL-CCNC: 17 U/L (ref 10–40)
BASOPHILS # BLD AUTO: 0.01 K/UL (ref 0–0.2)
BASOPHILS NFR BLD: 0.1 % (ref 0–1.9)
BILIRUB SERPL-MCNC: 0.2 MG/DL (ref 0.1–1)
BUN SERPL-MCNC: 33 MG/DL (ref 8–23)
CALCIUM SERPL-MCNC: 8.6 MG/DL (ref 8.7–10.5)
CHLORIDE SERPL-SCNC: 100 MMOL/L (ref 95–110)
CO2 SERPL-SCNC: 20 MMOL/L (ref 23–29)
CREAT SERPL-MCNC: 2.4 MG/DL (ref 0.5–1.4)
DIFFERENTIAL METHOD: ABNORMAL
EOSINOPHIL # BLD AUTO: 0 K/UL (ref 0–0.5)
EOSINOPHIL NFR BLD: 0 % (ref 0–8)
ERYTHROCYTE [DISTWIDTH] IN BLOOD BY AUTOMATED COUNT: 13.9 % (ref 11.5–14.5)
EST. GFR  (NO RACE VARIABLE): 20 ML/MIN/1.73 M^2
GLUCOSE SERPL-MCNC: 138 MG/DL (ref 70–110)
HCT VFR BLD AUTO: 28 % (ref 37–48.5)
HGB BLD-MCNC: 8.6 G/DL (ref 12–16)
IMM GRANULOCYTES # BLD AUTO: 0.04 K/UL (ref 0–0.04)
IMM GRANULOCYTES NFR BLD AUTO: 0.4 % (ref 0–0.5)
LYMPHOCYTES # BLD AUTO: 0.4 K/UL (ref 1–4.8)
LYMPHOCYTES NFR BLD: 4.2 % (ref 18–48)
MAGNESIUM SERPL-MCNC: 1.9 MG/DL (ref 1.6–2.6)
MCH RBC QN AUTO: 30.8 PG (ref 27–31)
MCHC RBC AUTO-ENTMCNC: 30.7 G/DL (ref 32–36)
MCV RBC AUTO: 100 FL (ref 82–98)
MONOCYTES # BLD AUTO: 0.2 K/UL (ref 0.3–1)
MONOCYTES NFR BLD: 1.8 % (ref 4–15)
NEUTROPHILS # BLD AUTO: 8.8 K/UL (ref 1.8–7.7)
NEUTROPHILS NFR BLD: 93.5 % (ref 38–73)
NRBC BLD-RTO: 0 /100 WBC
PHOSPHATE SERPL-MCNC: 3.6 MG/DL (ref 2.7–4.5)
PLATELET # BLD AUTO: 212 K/UL (ref 150–450)
PMV BLD AUTO: 9.7 FL (ref 9.2–12.9)
POTASSIUM SERPL-SCNC: 3.8 MMOL/L (ref 3.5–5.1)
PROT SERPL-MCNC: 6.9 G/DL (ref 6–8.4)
RBC # BLD AUTO: 2.79 M/UL (ref 4–5.4)
SODIUM SERPL-SCNC: 134 MMOL/L (ref 136–145)
WBC # BLD AUTO: 9.46 K/UL (ref 3.9–12.7)

## 2022-12-11 PROCEDURE — 25000242 PHARM REV CODE 250 ALT 637 W/ HCPCS: Performed by: NURSE PRACTITIONER

## 2022-12-11 PROCEDURE — 96361 HYDRATE IV INFUSION ADD-ON: CPT

## 2022-12-11 PROCEDURE — 36415 COLL VENOUS BLD VENIPUNCTURE: CPT | Performed by: INTERNAL MEDICINE

## 2022-12-11 PROCEDURE — 84100 ASSAY OF PHOSPHORUS: CPT | Performed by: INTERNAL MEDICINE

## 2022-12-11 PROCEDURE — 96366 THER/PROPH/DIAG IV INF ADDON: CPT

## 2022-12-11 PROCEDURE — 25000003 PHARM REV CODE 250: Performed by: HOSPITALIST

## 2022-12-11 PROCEDURE — 80053 COMPREHEN METABOLIC PANEL: CPT | Performed by: INTERNAL MEDICINE

## 2022-12-11 PROCEDURE — 25000003 PHARM REV CODE 250: Performed by: NURSE PRACTITIONER

## 2022-12-11 PROCEDURE — 94640 AIRWAY INHALATION TREATMENT: CPT | Mod: XB

## 2022-12-11 PROCEDURE — 97162 PT EVAL MOD COMPLEX 30 MIN: CPT

## 2022-12-11 PROCEDURE — 63600175 PHARM REV CODE 636 W HCPCS: Performed by: HOSPITALIST

## 2022-12-11 PROCEDURE — P9045 ALBUMIN (HUMAN), 5%, 250 ML: HCPCS | Mod: JG | Performed by: INTERNAL MEDICINE

## 2022-12-11 PROCEDURE — 27000221 HC OXYGEN, UP TO 24 HOURS

## 2022-12-11 PROCEDURE — 83735 ASSAY OF MAGNESIUM: CPT | Performed by: INTERNAL MEDICINE

## 2022-12-11 PROCEDURE — 63600175 PHARM REV CODE 636 W HCPCS: Performed by: NURSE PRACTITIONER

## 2022-12-11 PROCEDURE — G0378 HOSPITAL OBSERVATION PER HR: HCPCS

## 2022-12-11 PROCEDURE — 63600175 PHARM REV CODE 636 W HCPCS: Mod: JG | Performed by: INTERNAL MEDICINE

## 2022-12-11 PROCEDURE — 94799 UNLISTED PULMONARY SVC/PX: CPT

## 2022-12-11 PROCEDURE — 99900035 HC TECH TIME PER 15 MIN (STAT)

## 2022-12-11 PROCEDURE — 94761 N-INVAS EAR/PLS OXIMETRY MLT: CPT

## 2022-12-11 PROCEDURE — 85025 COMPLETE CBC W/AUTO DIFF WBC: CPT | Performed by: INTERNAL MEDICINE

## 2022-12-11 PROCEDURE — 96376 TX/PRO/DX INJ SAME DRUG ADON: CPT

## 2022-12-11 PROCEDURE — 25000003 PHARM REV CODE 250: Performed by: INTERNAL MEDICINE

## 2022-12-11 PROCEDURE — 97116 GAIT TRAINING THERAPY: CPT

## 2022-12-11 RX ORDER — ALBUMIN HUMAN 50 G/1000ML
25 SOLUTION INTRAVENOUS ONCE
Status: COMPLETED | OUTPATIENT
Start: 2022-12-11 | End: 2022-12-11

## 2022-12-11 RX ORDER — TALC
6 POWDER (GRAM) TOPICAL NIGHTLY PRN
Status: DISCONTINUED | OUTPATIENT
Start: 2022-12-11 | End: 2022-12-12 | Stop reason: HOSPADM

## 2022-12-11 RX ADMIN — ALUMINUM HYDROXIDE, MAGNESIUM HYDROXIDE, AND SIMETHICONE 30 ML: 200; 200; 20 SUSPENSION ORAL at 03:12

## 2022-12-11 RX ADMIN — ACETAMINOPHEN 650 MG: 325 TABLET ORAL at 11:12

## 2022-12-11 RX ADMIN — IPRATROPIUM BROMIDE AND ALBUTEROL SULFATE 3 ML: 2.5; .5 SOLUTION RESPIRATORY (INHALATION) at 08:12

## 2022-12-11 RX ADMIN — IPRATROPIUM BROMIDE AND ALBUTEROL SULFATE 3 ML: 2.5; .5 SOLUTION RESPIRATORY (INHALATION) at 12:12

## 2022-12-11 RX ADMIN — MUPIROCIN: 20 OINTMENT TOPICAL at 08:12

## 2022-12-11 RX ADMIN — METHYLPREDNISOLONE SODIUM SUCCINATE 80 MG: 40 INJECTION, POWDER, FOR SOLUTION INTRAMUSCULAR; INTRAVENOUS at 05:12

## 2022-12-11 RX ADMIN — AZITHROMYCIN MONOHYDRATE 500 MG: 500 INJECTION, POWDER, LYOPHILIZED, FOR SOLUTION INTRAVENOUS at 10:12

## 2022-12-11 RX ADMIN — PROMETHAZINE HYDROCHLORIDE 12.5 MG: 25 INJECTION INTRAMUSCULAR; INTRAVENOUS at 08:12

## 2022-12-11 RX ADMIN — ALBUMIN (HUMAN) 25 G: 2.5 SOLUTION INTRAVENOUS at 12:12

## 2022-12-11 RX ADMIN — ATORVASTATIN CALCIUM 80 MG: 40 TABLET, FILM COATED ORAL at 09:12

## 2022-12-11 RX ADMIN — MUPIROCIN: 20 OINTMENT TOPICAL at 09:12

## 2022-12-11 RX ADMIN — IPRATROPIUM BROMIDE AND ALBUTEROL SULFATE 3 ML: 2.5; .5 SOLUTION RESPIRATORY (INHALATION) at 04:12

## 2022-12-11 RX ADMIN — METHYLPREDNISOLONE SODIUM SUCCINATE 80 MG: 125 INJECTION, POWDER, FOR SOLUTION INTRAMUSCULAR; INTRAVENOUS at 06:12

## 2022-12-11 RX ADMIN — METHYLPREDNISOLONE SODIUM SUCCINATE 80 MG: 125 INJECTION, POWDER, FOR SOLUTION INTRAMUSCULAR; INTRAVENOUS at 11:12

## 2022-12-11 RX ADMIN — METHYLPREDNISOLONE SODIUM SUCCINATE 80 MG: 125 INJECTION, POWDER, FOR SOLUTION INTRAMUSCULAR; INTRAVENOUS at 12:12

## 2022-12-11 RX ADMIN — NEPHROCAP 1 CAPSULE: 1 CAP ORAL at 09:12

## 2022-12-11 RX ADMIN — ONDANSETRON 4 MG: 2 INJECTION INTRAMUSCULAR; INTRAVENOUS at 05:12

## 2022-12-11 RX ADMIN — CEFTRIAXONE 1 G: 1 INJECTION, POWDER, FOR SOLUTION INTRAMUSCULAR; INTRAVENOUS at 10:12

## 2022-12-11 RX ADMIN — IPRATROPIUM BROMIDE AND ALBUTEROL SULFATE 3 ML: 2.5; .5 SOLUTION RESPIRATORY (INHALATION) at 11:12

## 2022-12-11 RX ADMIN — ALLOPURINOL 100 MG: 100 TABLET ORAL at 09:12

## 2022-12-11 RX ADMIN — SODIUM CHLORIDE 250 ML: 0.9 INJECTION, SOLUTION INTRAVENOUS at 08:12

## 2022-12-11 RX ADMIN — ONDANSETRON 4 MG: 2 INJECTION INTRAMUSCULAR; INTRAVENOUS at 09:12

## 2022-12-11 NOTE — AI DETERIORATION ALERT
Artificial Intelligence Notification  [facility]      Admit Date: 2022  LOS: 0  Code Status: Full Code   Date of Consult: 2022  : 1945  Age: 77 y.o.  Weight:   Wt Readings from Last 1 Encounters:   12/10/22 40 kg (88 lb 1.5 oz)     Sex: female  Bed: K461/K461 A:   MRN: [id number]  Attending Physician: Emma Hansen MD  Primary Service: [facility] PHYSICIAN TEAM 2  Time AI Alert Received: 01:08 am 2022  Time at Bedside: 02:07 am 2022           A.I. alert due to RR increase to 24, possibly some increased anxiety. Prior to bedside evaluation, requested re-check vital signs and overall they are improved and patient feeling okay.       Vital Signs (Most Recent):  Temp: 97.5 °F (36.4 °C) (22)  Pulse: 100 (22)  Resp: (!) 24 (22)  BP: (!) 92/54 (22)  SpO2: 96 % (22)   Vital Signs (24h Range):  Temp:  [96.1 °F (35.6 °C)-97.6 °F (36.4 °C)] 97.5 °F (36.4 °C)  Pulse:  [] 100  Resp:  [14-34] 24  SpO2:  [92 %-98 %] 96 %  BP: ()/(52-73) 92/54         This encounter was triggered by an Artificial Intelligence Notification.     Artificial Intelligence alert discussed with Primary team:  Name: Dr. Tristan Gudino, [facility] [facility]      Evaluation:   Went to evaluation and check on patient. I saw her the night before and she looks much better tonight than previous night. She was awake and talking to me without any distress. She says she is hoping she can go home tomorrow - I told her that will be up to the day team to determine that, but provided encouragement.  She says she doesn't need anything else at this time and is just resting.     Disposition:   Continue RN monitoring and bedside care per routine.

## 2022-12-11 NOTE — PLAN OF CARE
Patient seen for physical therapy evaluation on this date.  Patient agreeable to participate, full report to follow.  Evaluation limited by mild SOB that patient reports is much better than several days ago.  Patient on 3L supplemental O2 during entire assessment.  Anticipate patient will be able to return home with Home Health PT/OT.  Patient would benefit from a bedside commode for home use.      Problem: Physical Therapy  Goal: Physical Therapy Goal  Description: Goals to be met by: 1/10/2023     Patient will increase functional independence with mobility by performin. Supine to sit with Modified Leon  2. Sit to supine with Modified Leon  3. Rolling to Left and Right with Modified Leon.  4. Sit to stand transfer with Modified Leon  5. Gait  x 100 feet with Supervision using Rolling Walker vs no AD, implementing pacing techniques  6. Ascend/descend 1 stair Stand-by Assistance using Rolling Walker.     Outcome: Ongoing, Progressing

## 2022-12-11 NOTE — ASSESSMENT & PLAN NOTE
CAP (community acquired pneumonia)  Severe stage 4 COPD with acute exacerbation due to CAP    Continuous cardiac monitoring  -improving clinically  O2 sats stable on 3 L NC CURRENTLY (this is her baseline pr patient)  duonebs  Methylprednisolone IV..SWITCH TO ORAL taper on dc  Patient on trilegy inhaler at home, will resume on discharge   -Oxygen Supplementation if necessary  -Continue HD management of heart failure

## 2022-12-11 NOTE — SUBJECTIVE & OBJECTIVE
Interval History: pt awake alert and no acutre distress. No complaints. BP on the lower side thsi morning. Appears comfortable. Denies cp or sob. Pt in good spirits and states she is hoping to go home soon. Plan dioscussed with patient.. possible dc in am on further clinical improvement and if BP remains stable. Pt declines any placement of any sort at this time. States she on 3 litres NC at baseline.     Review of Systems   Constitutional:  Negative for activity change, fatigue and fever.   Respiratory:  Negative for cough and shortness of breath.    Cardiovascular:  Negative for chest pain and leg swelling.   Gastrointestinal:  Negative for abdominal pain, nausea and vomiting.   All other systems reviewed and are negative.  Objective:     Vital Signs (Most Recent):  Temp: 97.1 °F (36.2 °C) (12/11/22 0757)  Pulse: 100 (12/11/22 0757)  Resp: 18 (12/11/22 0757)  BP: (!) 94/51 (12/11/22 0757)  SpO2: (!) 93 % (12/11/22 0757)   Vital Signs (24h Range):  Temp:  [96.1 °F (35.6 °C)-98.2 °F (36.8 °C)] 97.1 °F (36.2 °C)  Pulse:  [] 100  Resp:  [14-24] 18  SpO2:  [92 %-96 %] 93 %  BP: ()/(51-56) 94/51     Weight: 42.2 kg (93 lb 0.6 oz)  Body mass index is 17.02 kg/m².    Intake/Output Summary (Last 24 hours) at 12/11/2022 0845  Last data filed at 12/11/2022 0558  Gross per 24 hour   Intake 327.82 ml   Output --   Net 327.82 ml      Physical Exam  Constitutional:       General: She is not in acute distress.     Comments: frail   HENT:      Head: Normocephalic.      Nose: Nose normal.   Eyes:      Pupils: Pupils are equal, round, and reactive to light.   Cardiovascular:      Rate and Rhythm: Normal rate and regular rhythm.   Pulmonary:      Effort: Pulmonary effort is normal.      Breath sounds: Normal breath sounds.   Abdominal:      General: Bowel sounds are normal. There is no distension.      Tenderness: There is no abdominal tenderness.   Musculoskeletal:         General: No swelling or deformity.   Skin:      General: Skin is warm.   Neurological:      General: No focal deficit present.      Mental Status: She is alert and oriented to person, place, and time.   Psychiatric:         Mood and Affect: Mood normal.       Significant Labs: All pertinent labs within the past 24 hours have been reviewed.    Significant Imaging: I have reviewed all pertinent imaging results/findings within the past 24 hours.

## 2022-12-11 NOTE — PLAN OF CARE
12/11/22 0103   Patient Request   Patient Requested AI   Nurse Notification   Charge Nurse Notified? Yes   Name of Charge Nurse Thi SANCHEZ   Bedside Nurse Notified? Yes   Name of Bedside Nurse Ko HERNÁNDEZ   Nurse Notfication Method Secure Chat   Nurse Notified Of Other  (AI Alert)   Provider Notification   Provider Notified? Yes   Name of Provider Dr. Gudino   Provider Notification Method Secure Chat   Provider Notified Of AI Deterioration Alert

## 2022-12-11 NOTE — PT/OT/SLP EVAL
"Physical Therapy Evaluation    Patient Name:  Katie Quevedo   MRN:  [id number]    Recommendations:     Discharge Recommendations: home health OT, home health PT   Discharge Equipment Recommendations: bedside commode   Barriers to discharge:  endurance, requiring assist with functional mobility    Assessment:     Katie Quevedo is a 77 y.o. female admitted with a medical diagnosis of Acute on chronic respiratory failure with hypoxia.  She presents with the following impairments/functional limitations: weakness, impaired functional mobility, impaired cardiopulmonary response to activity, impaired endurance, gait instability, pain, impaired balance, impaired self care skills, decreased lower extremity function Patient seen for physical therapy evaluation on this date.  Patient agreeable to participate, full report to follow.  Evaluation limited by mild SOB that patient reports is much better than several days ago.  Patient on 3L supplemental O2 during entire assessment.  Anticipate patient will be able to return home with Home Health PT/OT.  Patient would benefit from a bedside commode for home use..    Rehab Prognosis: Fair; patient would benefit from acute skilled PT services to address these deficits and reach maximum level of function.    Recent Surgery: * No surgery found *      Plan:     During this hospitalization, patient to be seen 3 x/week to address the identified rehab impairments via gait training, therapeutic activities, therapeutic exercises, neuromuscular re-education and progress toward the following goals:    Plan of Care Expires:  01/10/23    Subjective     Chief Complaint: "I feel like I am doing a bit better"  Patient/Family Comments/goals: To return home at OF  Pain/Comfort:  Pain Rating 1: 6/10  Location - Side 1: Bilateral  Location - Orientation 1: lower  Location 1: back  Pain Addressed 1: Reposition, Distraction, Nurse notified  Pain Rating Post-Intervention 1: 6/10    Patients cultural, " spiritual, Orthodox conflicts given the current situation: no    Living Environment:  Patient lives with spouse in a SSH, threshold to enter, Walk in Tub with door and seat in bathroom.  GB by toilet and tub.    Prior to admission, patients level of function was Modified Independent - walks in the home holding onto walls and furniture, uses RW or scooter for mobility outside of home.  Caregiver assists with transporting her to Dialysis.  Spouse cannot physically assist patient - he is a cardiac patient awaiting a surgery in a few months.  Equipment used at home: walker, rolling, rollator, bath bench, grab bar, oxygen (motorized scooter).  DME owned (not currently used): none.  Upon discharge, patient will have assistance from caregiver.    Objective:     Communicated with nurse Mclaughlin prior to session.  Patient found supine with telemetry, PureWick, peripheral IV, oxygen  upon PT entry to room.    General Precautions: Standard, fall  Orthopedic Precautions:N/A   Braces: N/A  Respiratory Status: Nasal cannula, flow 3 L/min    Exams:  Cognitive Exam:  Patient is oriented to Person, Place, Time, and Situation  Fine Motor Coordination:    -       Intact  Left hand, finger to nose, Right hand, finger to nose, Left hand thumb/finger opposition skills, and Right hand thumb/finger opposition skills  Gross Motor Coordination:  WFL  Postural Exam:  Patient presented with the following abnormalities:    -       Rounded shoulders  -       Forward head  Sensation:    -       Intact  light/touch B LEs grossly  Skin Integrity/Edema:      -       Skin integrity: Visible skin intact  RLE ROM: WFL  RLE Strength: Deficits: 3+ /5 grossly  LLE ROM: WFL  LLE Strength: Deficits: 3+/5 grossly    Functional Mobility:  Bed Mobility:     Rolling Left:  stand by assistance  Scooting: stand by assistance  Supine to Sit: contact guard assistance  Transfers:     Sit to Stand:  contact guard assistance with no AD  Gait: x 40' with no AD on level  surfaces, VC's for pacing, mild SOB, but recovers quickly, on 3L supplemental O2  Balance: Seated:  Supervision    Standing:  requires CGA/HHA for dynamic Balance, mildly unsteady, no overt LOB      AM-PAC 6 CLICK MOBILITY  Total Score:16       Treatment & Education:  Patient educated on role of physical therapy and POC.  Patient encouraged to spend time OOB and perform incentive spirometer daily.  Patient also educated on pacing and energy conservation techniques.      Patient left up in chair with all lines intact, call button in reach, chair alarm on, and nurse notified.    GOALS:   Multidisciplinary Problems       Physical Therapy Goals          Problem: Physical Therapy    Goal Priority Disciplines Outcome Goal Variances Interventions   Physical Therapy Goal     PT, PT/OT Ongoing, Progressing     Description: Goals to be met by: 1/10/2023     Patient will increase functional independence with mobility by performin. Supine to sit with Modified Bland  2. Sit to supine with Modified Bland  3. Rolling to Left and Right with Modified Bland.  4. Sit to stand transfer with Modified Bland  5. Gait  x 100 feet with Supervision using Rolling Walker vs no AD, implementing pacing techniques  6. Ascend/descend 1 stair Stand-by Assistance using Rolling Walker.                          History:     Past Medical History:   Diagnosis Date    Acute congestive heart failure 02/10/2020    Anemia     Bilateral renal cysts     Cataract     Chronic LBP 2012    Chronic pain     CKD (chronic kidney disease), stage IV     Colon polyp     COPD (chronic obstructive pulmonary disease)     Dehydration     Encounter for blood transfusion     HTN (hypertension)     Lumbar spondylosis     Melanoma     of the lip    Metabolic bone disease     Migraines, neuralgic     Osteoporosis     Primary osteoarthritis of both knees     s/p Rt TKA    Pulmonary embolism with infarction     Seizures 1972    x1 only     Subdeltoid bursitis, L>R. 9/27/2012    Ulcer     Vitamin D deficiency disease        Past Surgical History:   Procedure Laterality Date    BLADDER SUSPENSION      CATARACT EXTRACTION  11/18/13    left eye    CERVICAL LAMINECTOMY      x3, fusion x1    COLONOSCOPY  2009    DECLOTTING OF ARTERIOVENOUS GRAFT Left 1/3/2022    Procedure: [id number];  Surgeon: Lindsey Louie MD;  Location: [facility] OR;  Service: Vascular;  Laterality: Left;    DECLOTTING OF ARTERIOVENOUS GRAFT Left 2/8/2022    Procedure: [id number];  Surgeon: Lindsey Louie MD;  Location: [facility] OR;  Service: Vascular;  Laterality: Left;    DECLOTTING OF ARTERIOVENOUS GRAFT Left 4/8/2022    Procedure: [id number];  Surgeon: Lindsey Louie MD;  Location: [facility] OR;  Service: Vascular;  Laterality: Left;    FISTULOGRAM N/A 2/27/2020    Procedure: Fistulogram;  Surgeon: Noe Benitez MD;  Location: [facility] CATH LAB/EP;  Service: Cardiology;  Laterality: N/A;    HYSTERECTOMY      JOINT REPLACEMENT  2001    total right knee     LUMBAR LAMINECTOMY      x 3, fusion x1    OOPHORECTOMY      PERIPHERAL ANGIOGRAPHY N/A 3/9/2020    Procedure: Peripheral angiography;  Surgeon: Jacinto Henriquez MD;  Location: [facility] CATH LAB/EP;  Service: Cardiology;  Laterality: N/A;    PLACEMENT OF ARTERIOVENOUS GRAFT Left 1/21/2020    Procedure: INSERTION, GRAFT, ARTERIOVENOUS;  Surgeon: Lindsey Louie MD;  Location: [facility] OR;  Service: General;  Laterality: Left;    PLACEMENT OF ARTERIOVENOUS GRAFT Right 7/22/2022    Procedure: INSERTION, GRAFT, ARTERIOVENOUS;  Surgeon: Lindsey Louie MD;  Location: [facility] OR;  Service: General;  Laterality: Right;    PLACEMENT OF DUAL-LUMEN VASCULAR CATHETER Right 4/16/2022    Procedure: INSERTION-CATHETER-RICKI;  Surgeon: Lindsey Louie MD;  Location: [facility];  Service: General;  Laterality: Right;    THROMBECTOMY Left 2/3/2020    Procedure: THROMBECTOMY;  Surgeon: Lindsey Louie MD;   Location: [facility] OR;  Service: General;  Laterality: Left;    THROMBECTOMY  3/9/2020    Procedure: Thrombectomy;  Surgeon: Jacinto Henriquez MD;  Location: [facility] CATH LAB/EP;  Service: Cardiology;;    THROMBECTOMY Left 4/7/2022    Procedure: THROMBECTOMY;  Surgeon: Lindsey Louie MD;  Location: [facility] OR;  Service: General;  Laterality: Left;       Time Tracking:     PT Received On: 12/11/22  PT Start Time: 1105     PT Stop Time: 1128  PT Total Time (min): 23 min     Billable Minutes: Evaluation 15 and Gait Training 8      12/11/2022

## 2022-12-11 NOTE — ASSESSMENT & PLAN NOTE
-poa  -improving clinically  -afebrile  -no leucocytosis  -CXR on 12/9/22=1. Chronic appearing interstitial findings.  Increased parenchymal attenuation projects over the bilateral lower lung zones, also similar to the previous examination noting small left pleural effusion.  Correlation with any history of COPD/emphysema.  -Blood cx x 2 on 12/9/22=NEG X 48HRS  -Nebs  -IS q2 hr when awake  -zithromax daily x 5 days. Ordered on 12/10/22  -Rocephin daily x 5 days. Ordered on 12/10/22

## 2022-12-11 NOTE — PROGRESS NOTES
Nephrology Progress      Consult Requested By: Emma Hansen MD  Reason for Consult:   SUBJECTIVE:     Review of Systems   Constitutional:  Negative for chills and fever.   Respiratory:  Positive for cough and shortness of breath.    Cardiovascular:  Negative for chest pain and leg swelling.   Gastrointestinal:  Negative for nausea.        OBJECTIVE:     Vital Signs (Most Recent)  Vitals:    12/11/22 0520 12/11/22 0600 12/11/22 0742 12/11/22 0757   BP: (!) 98/56   (!) 94/51   BP Location:    Right arm   Patient Position: Lying   Lying   Pulse: 99   100   Resp: 14   18   Temp: 97.1 °F (36.2 °C)   97.1 °F (36.2 °C)   TempSrc: Axillary   Oral   SpO2: 96%  95% (!) 93%   Weight:  42.2 kg (93 lb 0.6 oz)     Height:           Medications:   sodium chloride 0.9%   Intravenous Once    albumin human 5%  25 g Intravenous Once    allopurinoL  100 mg Oral Every Tues, Thurs, Sat, Sun    atorvastatin  80 mg Oral Daily    azithromycin  500 mg Intravenous Q24H    cefTRIAXone (ROCEPHIN) IVPB  1 g Intravenous Q24H    [START ON 12/12/2022] epoetin hemant-epbx  5,000 Units Intravenous Every Mon, Wed, Fri    methylPREDNISolone sodium succinate injection  80 mg Intravenous Q6H    mupirocin   Nasal BID    vitamin renal formula (B-complex-vitamin c-folic acid)  1 capsule Oral Daily     Physical Exam  Constitutional:       General: She is not in acute distress.     Appearance: She is not diaphoretic.   HENT:      Head: Normocephalic and atraumatic.   Eyes:      General: No scleral icterus.  Neck:      Vascular: No JVD.   Cardiovascular:      Rate and Rhythm: Normal rate and regular rhythm.      Heart sounds: No murmur heard.    No friction rub.   Pulmonary:      Effort: Pulmonary effort is normal. No respiratory distress.      Breath sounds: Rales present. No wheezing.   Abdominal:      General: Bowel sounds are normal. There is no distension.      Palpations: Abdomen is soft.      Tenderness: There is no abdominal tenderness.    Musculoskeletal:      Cervical back: Neck supple.   Skin:     General: Skin is warm and dry.      Findings: No erythema or rash.   Neurological:      Mental Status: She is oriented to person, place, and time.      GCS: GCS eye subscore is 4. GCS verbal subscore is 5. GCS motor subscore is 6.      Motor: Atrophy present.   Psychiatric:         Mood and Affect: Affect normal.     Diagnostic Results:  X-Ray: Reviewed  US: Reviewed  Echo: Reviewed  ASSESSMENT/PLAN:   1. ESRD (N18.6 Z99.2) - HD [facility] [place] With Dr Aura Diggs MW, for 3 h  Completed Friday, appears euvolemic, electrolytes at goal, no ind for HD today - plan for monday  Recent Labs   Lab 12/09/22  1757 12/10/22  0319 12/11/22  0330    137 134*   K 3.3* 3.7 3.8   BUN 5* 8 33*   CREATININE 1.1 1.4 2.4*       2. HTN (I10) - on a low side, off BP meds  3. Anemia of chronic kidney disease treated with BILL (N18.9 D63.1) - EPogen 5k  with each HD  Recent Labs   Lab 12/09/22  1757 12/10/22  0319 12/11/22  0330   HGB 9.2* 9.4* 8.6*   HCT 30.4* 30.7* 28.0*    213 212       Lab Results   Component Value Date    IRON 13 (L) 02/12/2020    TIBC 462 (H) 02/12/2020    FERRITIN 148 07/17/2019     4. MBD (E88.9 M90.80) - hold Renvela, hypercalcemia (corrected ca > 10)--> hold vit D products  Recent Labs   Lab 12/11/22  0330   CALCIUM 8.6*   PHOS 3.6       Recent Labs   Lab 12/10/22  0319 12/11/22  0330   MG 1.8 1.9       Lab Results   Component Value Date    ZXDIKFBD25CN 26 (L) 02/12/2020     5. Acidosis control with HD  Recent Labs   Lab 12/09/22  1757 12/10/22  0319 12/11/22  0330    102 100   CO2 28 26 20*       6. Hemodialysis Access (Z99.2 V45.11)- R IJ tunneled CVC  7. Nutrition/Hypoalbuminemia (E88.09) -   Recent Labs   Lab 12/10/22  0319 12/11/22  0330   ALBUMIN 2.7* 2.6*       Nepro with meals TID. Renal vitamins daily        Thank you for allowing me to participate in care of your patient  With any question please call  Yadi  Paula    [facility]  EDGARD Bustillos MD, FACP,   NEIL Diggs MD,   MD CORKY Benoit MD E. V. Harmon, NP  [address]  [place], [address] [phone]  After hours answering service: [phone]

## 2022-12-11 NOTE — PLAN OF CARE
[place] - Telemetry      HOME HEALTH ORDERS  FACE TO FACE ENCOUNTER    Patient Name: Katie Quevedo  YOB: 1945    PCP: Kingston Verduzco MD   PCP Address: [address] / [place] [address]  PCP Phone Number: [phone]  PCP Fax: [phone]    Encounter Date: 12/9/22    Admit to Home Health    Diagnoses:  Active Hospital Problems    Diagnosis  POA    *Acute on chronic respiratory failure with hypoxia [J96.21]  Yes     Priority: 1 - High    CAP (community acquired pneumonia) [J18.9]  Yes     Priority: 2     severe stage 4 COPD [J44.1]  Yes     Priority: 24     Physical deconditioning [R53.81]  Yes    Hypokalemia [E87.6]  Yes    Adjustment reaction with anxiety and depression [F43.23]  Yes    ESRD (end stage renal disease) on dialysis [N18.6, Z99.2]  Not Applicable     Chronic             Chronic diastolic heart failure [I50.32]  Yes     She takes lasix 20mg BID. Continue lasix & follow up with cardiology.      Essential hypertension [I10]  Yes     Chronic    Chronic pain [G89.29]  Yes      Resolved Hospital Problems   No resolved problems to display.       Follow Up Appointments:  Future Appointments   Date Time Provider Department Center   12/13/2022  8:00 AM Katharine Urbina NP [address]   12/13/2022 10:45 AM Trevor Mahan MD [facility] ORTHO [place]   12/19/2022 11:00 AM Shanti Avendaño MD [facility] MED CLN [place] [facility]   1/17/2023 10:20 AM Jacinto Henriquez MD [facility] CARDIO [facility]   1/20/2023 11:00 AM Giancarlo Rust MD [facility] PULMSVC [place]   2/14/2023  1:00 PM Pushpa Mcmahon MD [facility] PHYSMED [place]   3/23/2023  1:40 PM Meryl Sims OD [facility] [place]       Allergies:  Review of patient's allergies indicates:   Allergen Reactions    Aspirin      Other reaction(s): hx of ulcers    Tetracycline Swelling     Other reaction(s): Swelling    Penicillins Rash     Other reaction(s): Hives  Other reaction(s): Rash  Other reaction(s): Rash  Other reaction(s): Hives        Medications: Review discharge medications with patient and family and provide education.    Current Facility-Administered Medications   Medication Dose Route Frequency Provider Last Rate Last Admin    0.9%  NaCl infusion   Intravenous Once Yadi Mitchell MD        acetaminophen tablet 650 mg  650 mg Oral Q6H PRN Kandy Man NP   650 mg at 12/10/22 0957    albumin human 5% bottle 25 g  25 g Intravenous Once Emma Hansen MD        albuterol-ipratropium 2.5 mg-0.5 mg/3 mL nebulizer solution 3 mL  3 mL Nebulization Q4H PRN Kandy Man NP   3 mL at 12/11/22 0437    allopurinoL tablet 100 mg  100 mg Oral Every Tues, Thurs, Sat, Sun Moses Hernández MD   100 mg at 12/11/22 0913    aluminum-magnesium hydroxide-simethicone 200-200-20 mg/5 mL suspension 30 mL  30 mL Oral Q6H PRN Kandy Man NP   30 mL at 12/11/22 0307    atorvastatin tablet 80 mg  80 mg Oral Daily Kandy Man NP   80 mg at 12/11/22 0913    azithromycin 500 mg in dextrose 5 % 250 mL IVPB (ready to mix system)  500 mg Intravenous Q24H Kandy Man NP   Stopped at 12/10/22 2330    cefTRIAXone (ROCEPHIN) 1 g/50 mL D5W IVPB  1 g Intravenous Q24H Kandy Man NP   Stopped at 12/11/22 0008    [START ON 12/12/2022] epoetin hemant-epbx injection 5,000 Units  5,000 Units Intravenous Every Mon, Wed, Fri Yadi Mitchell MD        heparin (porcine) injection 5,000 Units  5,000 Units Intra-Catheter PRN Yadi Mitchell MD        hydrALAZINE injection 10 mg  10 mg Intravenous Q6H PRN Emma Hansen MD        lactulose 20 gram/30 mL solution Soln 20 g  20 g Oral Q8H PRN Emma Hansen MD        methylPREDNISolone sodium succinate injection 80 mg  80 mg Intravenous Q6H Moses Hernández MD   80 mg at 12/11/22 0621    mupirocin 2 % ointment   Nasal BID Yadi Mitchell MD   Given at 12/11/22 0913    ondansetron injection 4 mg  4 mg Intravenous Q6H PRN Kandy Man NP   4 mg at 12/11/22 0916    sodium chloride 0.9% bolus  250 mL  250 mL Intravenous PRN Yadi Mitchell MD        sodium chloride 0.9% flush 10 mL  10 mL Intravenous Q12H PRN Kandy Man NP        vitamin renal formula (B-complex-vitamin c-folic acid) 1 mg per capsule 1 capsule  1 capsule Oral Daily Yadi Mitchell MD   1 capsule at 12/11/22 0913     Current Discharge Medication List        CONTINUE these medications which have NOT CHANGED    Details   acetaminophen (TYLENOL) 325 MG tablet Take 1 tablet (325 mg total) by mouth every 6 (six) hours as needed for Pain.  Qty: 10 tablet, Refills: 0      albuterol (VENTOLIN HFA) 90 mcg/actuation inhaler Inhale 2 puffs into the lungs every 6 (six) hours as needed for Wheezing or Shortness of Breath. Rescue  Qty: 18 g, Refills: 3    Comments: May substitute for insurance needs  Associated Diagnoses: Centrilobular emphysema      albuterol-ipratropium (DUO-NEB) 2.5 mg-0.5 mg/3 mL nebulizer solution Take 3 mLs by nebulization every 6 (six) hours as needed for Shortness of Breath. Rescue  Qty: 180 mL, Refills: 11      allopurinoL (ZYLOPRIM) 100 MG tablet Take 1 tablet (100 mg total) by mouth on Tuesday, Thursday, Saturday, and Sunday.  Qty: 30 tablet, Refills: 0      amLODIPine (NORVASC) 5 MG tablet Take 5 mg by mouth once daily.      atorvastatin (LIPITOR) 80 MG tablet Take 80 mg by mouth once daily.      busPIRone (BUSPAR) 10 MG tablet Take 1 tablet (10 mg total) by mouth 2 (two) times daily.  Qty: 60 tablet, Refills: 11    Associated Diagnoses: Anxiety      dexAMETHasone (DECADRON) 4 mg/mL injection       diclofenac sodium (VOLTAREN) 1 % Gel Apply 2 g topically 3 (three) times daily.      diphenhydrAMINE (BENADRYL) 25 mg capsule Take 25 mg by mouth every 6 (six) hours as needed for Itching.      DULoxetine (CYMBALTA) 30 MG capsule Take 1 capsule (30 mg total) by mouth once daily.  Qty: 30 capsule, Refills: 3    Associated Diagnoses: Chronic midline low back pain with right-sided sciatica; Chronic neck pain; Primary  osteoarthritis of both hips      epoetin hemant-epbx (RETACRIT) 10,000 unit/mL imjection Inject 0.5 mLs (5,000 Units total) into the skin every Mon, Wed, Fri.      fluticasone-umeclidin-vilanter (TRELEGY ELLIPTA) 200-62.5-25 mcg inhaler Inhale 1 puff into the lungs once daily.  Qty: 60 each, Refills: 11      heparin, porcine, PF, (HEPARIN FLUSH 100 UNITS/ML) 100 unit/mL Syrg       HYDROcodone-acetaminophen (NORCO)  mg per tablet Take 1 tablet by mouth 3 (three) times daily as needed for Pain.  Qty: 90 tablet, Refills: 0    Comments: Medically necessary for > 7 days due to chronic pain.      LORazepam (ATIVAN) 0.5 MG tablet Take 1 tablet (0.5 mg total) by mouth every 12 (twelve) hours as needed for Anxiety.  Qty: 30 tablet, Refills: 1    Associated Diagnoses: Anxiety      megestroL (MEGACE) 40 MG Tab Take 40 mg by mouth once daily.      metoprolol tartrate (LOPRESSOR) 25 MG tablet Take 1 tablet (25 mg total) by mouth 2 (two) times daily.  Qty: 60 tablet, Refills: 11    Comments: .      ondansetron 4 mg/2 mL Soln       sevelamer carbonate (RENVELA) 800 mg Tab Take 1 tablet (800 mg total) by mouth after meals as needed.  Qty: 90 tablet, Refills: 11               I have seen and examined this patient within the last 30 days. My clinical findings that support the need for the home health skilled services and home bound status are the following:no   Weakness/numbness causing balance and gait disturbance due to COPD Exacerbation making it taxing to leave home.     Diet:   cardiac diet and renal diet    Nursing:   Agency to admit patient within 24 hours of hospital discharge unless specified on physician order or at patient request    SN to complete comprehensive assessment including routine vital signs. Instruct on disease process and s/s of complications to report to MD. Review/verify medication list sent home with the patient at time of discharge  and instruct patient/caregiver as needed. Frequency may be adjusted  depending on start of care date.     Skilled nurse to perform up to 3 visits PRN for symptoms related to diagnosis    Notify MD if SBP > 160 or < 90; DBP > 90 or < 50; HR > 120 or < 50; Temp > 101; O2 < 88%; Other:       Ok to schedule additional visits based on staff availability and patient request on consecutive days within the home health episode.    When multiple disciplines ordered:    Start of Care occurs on Sunday - Wednesday schedule remaining discipline evaluations as ordered on separate consecutive days following the start of care.    Thursday SOC -schedule subsequent evaluations Friday and Monday the following week.     Friday - Saturday SOC - schedule subsequent discipline evaluations on consecutive days starting Monday of the following week.    Home Health Aide:  Nursing Three times weekly, Physical Therapy Three times weekly, and Occupational Therapy Three times weekly    Wound Care Orders  no    I certify that this patient is confined to her home and needs intermittent skilled nursing care, physical therapy, and occupational therapy.

## 2022-12-11 NOTE — ASSESSMENT & PLAN NOTE
-Monitor BP=LOWER RANGE currently  -Cover with hydralazine 10mg iv prn for SBP> 160  -Adjust BP meds prn  -Held Norvasc 5 mg daily  -held metoprol tart 25mg po bid  -Albumin 25gm IV once on 12/11/22

## 2022-12-11 NOTE — NURSING
Pt 0800 BP was 94/51. Amlodipine 5mg and metoprolol 25mg held per ANGELA Hansen MD. Will continue to monitor.

## 2022-12-11 NOTE — PROGRESS NOTES
[facility] Medicine  Progress Note    Patient Name: Katie Quevedo  MRN: [id number]  Patient Class: OP- Observation   Admission Date: 12/9/2022  Length of Stay: 0 days  Attending Physician: Emma Hansen MD  Primary Care Provider: Kingston Verduzco MD        Subjective:     Principal Problem:Acute on chronic respiratory failure with hypoxia        HPI:  77 y.o. female who has a past medical history of Acute congestive heart failure, Anemia, Bilateral renal cysts, Cataract, Chronic LBP, Chronic pain, Lumbar spondylosis, CKD stage IV, Colon polyp, COPD, Dehydration, HTN,  Melanoma, Metabolic bone disease, Migraines, neuralgic, Osteoporosis, Primary osteoarthritis of both knees, Pulmonary embolism with infarction, Seizures, Subdeltoid bursitis, L>R, Ulcer, and Vitamin D deficiency disease.  She presents to the ED due to Shortness of Breath. She is a dialysis patient on 3L NC of O2 with last dialysis today.  Reports Shortness of Breath that started three days ago with associated cough and congestion. Patient reports they were able to get 1.8 fluids when she usually gets 1.5 out. She denies chest pain, abdominal pain, vomiting, diarrhea, dysuria, fever, and other associated factors. No known sick contacts recently.  No alleviating factors reported. ED findings: Chest xray showing chronic appearing interstitial findings, increased parenchymal attenuation projects over the bilateral lower lung zones, also similar to the previous examination noting small left pleural effusion. Hypokalemia, elevated CRP, , procal 0.31, covid negative, flu negative, u/a pending, blood cultures pending.  Patient being admitted to hospital medicine observation for further medical management of Acute on chronic respiratory failure, COPD exacerbation and possible developing pneumonia.       Overview/Hospital Course:  No notes on file    Interval History: pt awake alert and no acutre distress. No complaints. BP on the lower side  thsi morning. Appears comfortable. Denies cp or sob. Pt in good spirits and states she is hoping to go home soon. Plan dioscussed with patient.. possible dc in am on further clinical improvement and if BP remains stable. Pt declines any placement of any sort at this time. States she on 3 litres NC at baseline.     Review of Systems   Constitutional:  Negative for activity change, fatigue and fever.   Respiratory:  Negative for cough and shortness of breath.    Cardiovascular:  Negative for chest pain and leg swelling.   Gastrointestinal:  Negative for abdominal pain, nausea and vomiting.   All other systems reviewed and are negative.  Objective:     Vital Signs (Most Recent):  Temp: 97.1 °F (36.2 °C) (12/11/22 0757)  Pulse: 100 (12/11/22 0757)  Resp: 18 (12/11/22 0757)  BP: (!) 94/51 (12/11/22 0757)  SpO2: (!) 93 % (12/11/22 0757)   Vital Signs (24h Range):  Temp:  [96.1 °F (35.6 °C)-98.2 °F (36.8 °C)] 97.1 °F (36.2 °C)  Pulse:  [] 100  Resp:  [14-24] 18  SpO2:  [92 %-96 %] 93 %  BP: ()/(51-56) 94/51     Weight: 42.2 kg (93 lb 0.6 oz)  Body mass index is 17.02 kg/m².    Intake/Output Summary (Last 24 hours) at 12/11/2022 0845  Last data filed at 12/11/2022 0558  Gross per 24 hour   Intake 327.82 ml   Output --   Net 327.82 ml      Physical Exam  Constitutional:       General: She is not in acute distress.     Comments: frail   HENT:      Head: Normocephalic.      Nose: Nose normal.   Eyes:      Pupils: Pupils are equal, round, and reactive to light.   Cardiovascular:      Rate and Rhythm: Normal rate and regular rhythm.   Pulmonary:      Effort: Pulmonary effort is normal.      Breath sounds: Normal breath sounds.   Abdominal:      General: Bowel sounds are normal. There is no distension.      Tenderness: There is no abdominal tenderness.   Musculoskeletal:         General: No swelling or deformity.   Skin:     General: Skin is warm.   Neurological:      General: No focal deficit present.      Mental  Status: She is alert and oriented to person, place, and time.   Psychiatric:         Mood and Affect: Mood normal.       Significant Labs: All pertinent labs within the past 24 hours have been reviewed.    Significant Imaging: I have reviewed all pertinent imaging results/findings within the past 24 hours.      Assessment/Plan:      * Acute on chronic respiratory failure with hypoxia  CAP (community acquired pneumonia)  Severe stage 4 COPD with acute exacerbation due to CAP    Continuous cardiac monitoring  -improving clinically  O2 sats stable on 3 L NC CURRENTLY (this is her baseline pr patient)  duonebs  Methylprednisolone IV..SWITCH TO ORAL taper on dc  Patient on trilegy inhaler at home, will resume on discharge   -Oxygen Supplementation if necessary  -Continue HD management of heart failure     CAP (community acquired pneumonia)  -poa  -improving clinically  -afebrile  -no leucocytosis  -CXR on 12/9/22=1. Chronic appearing interstitial findings.  Increased parenchymal attenuation projects over the bilateral lower lung zones, also similar to the previous examination noting small left pleural effusion.  Correlation with any history of COPD/emphysema.  -Blood cx x 2 on 12/9/22=NEG X 48HRS  -Nebs  -IS q2 hr when awake  -zithromax daily x 5 days. Ordered on 12/10/22  -Rocephin daily x 5 days. Ordered on 12/10/22      severe stage 4 COPD  -as above  -op f/u with PULM      Physical deconditioning  -fall precautions  -PT OT      Hypokalemia  -monitor and replace prn  -Patient with ESRD        Adjustment reaction with anxiety and depression  resume buspirone and cymbalta       ESRD (end stage renal disease) on dialysis  On HD MWF  Strict I&O  Avoid nephrotoxic agents  Renally dose meds  Consulted nephrology =follow recs  Continue allopurinol and phosphate binder       Chronic diastolic heart failure  No acute exacerbation  Continuous cardiac monitoring   Continue HD fluid management   Most recent Echo with EF  75%  Daily Weight              Essential hypertension  -Monitor BP=LOWER RANGE currently  -Cover with hydralazine 10mg iv prn for SBP> 160  -Adjust BP meds prn  -Held Norvasc 5 mg daily  -held metoprol tart 25mg po bid  -Albumin 25gm IV once on 12/11/22      Chronic pain  Cover with pain meds prn   Caution with narcotic pain meds      VTE Risk Mitigation (From admission, onward)         Ordered     heparin (porcine) injection 5,000 Units  As needed (PRN)         12/10/22 0753     IP VTE HIGH RISK PATIENT  Once         12/09/22 2034     Place sequential compression device  Until discontinued         12/09/22 2034                Discharge Planning   LAITH:      Code Status: Full Code   Is the patient medically ready for discharge?:     Reason for patient still in hospital (select all that apply): Treatment                     Emma Hansen MD  Department of Hospital Medicine   [place] [facility]

## 2022-12-12 ENCOUNTER — PATIENT MESSAGE (OUTPATIENT)
Dept: ORTHOPEDICS | Facility: CLINIC | Age: 77
End: 2022-12-12
Payer: MEDICARE

## 2022-12-12 VITALS
DIASTOLIC BLOOD PRESSURE: 75 MMHG | BODY MASS INDEX: 17.12 KG/M2 | TEMPERATURE: 98 F | WEIGHT: 93.06 LBS | SYSTOLIC BLOOD PRESSURE: 111 MMHG | RESPIRATION RATE: 18 BRPM | HEART RATE: 105 BPM | HEIGHT: 62 IN | OXYGEN SATURATION: 97 %

## 2022-12-12 DIAGNOSIS — M25.532 LEFT WRIST PAIN: Primary | ICD-10-CM

## 2022-12-12 LAB
ALBUMIN SERPL BCP-MCNC: 3.1 G/DL (ref 3.5–5.2)
ALP SERPL-CCNC: 168 U/L (ref 55–135)
ALT SERPL W/O P-5'-P-CCNC: 16 U/L (ref 10–44)
ANION GAP SERPL CALC-SCNC: 12 MMOL/L (ref 8–16)
AST SERPL-CCNC: 25 U/L (ref 10–40)
BASOPHILS # BLD AUTO: 0.01 K/UL (ref 0–0.2)
BASOPHILS NFR BLD: 0.1 % (ref 0–1.9)
BILIRUB SERPL-MCNC: 0.2 MG/DL (ref 0.1–1)
BUN SERPL-MCNC: 58 MG/DL (ref 8–23)
CALCIUM SERPL-MCNC: 8.5 MG/DL (ref 8.7–10.5)
CHLORIDE SERPL-SCNC: 95 MMOL/L (ref 95–110)
CO2 SERPL-SCNC: 19 MMOL/L (ref 23–29)
CREAT SERPL-MCNC: 3.5 MG/DL (ref 0.5–1.4)
DIFFERENTIAL METHOD: ABNORMAL
EOSINOPHIL # BLD AUTO: 0 K/UL (ref 0–0.5)
EOSINOPHIL NFR BLD: 0 % (ref 0–8)
ERYTHROCYTE [DISTWIDTH] IN BLOOD BY AUTOMATED COUNT: 14.1 % (ref 11.5–14.5)
EST. GFR  (NO RACE VARIABLE): 13 ML/MIN/1.73 M^2
GLUCOSE SERPL-MCNC: 133 MG/DL (ref 70–110)
HCT VFR BLD AUTO: 25.5 % (ref 37–48.5)
HGB BLD-MCNC: 7.8 G/DL (ref 12–16)
IMM GRANULOCYTES # BLD AUTO: 0.06 K/UL (ref 0–0.04)
IMM GRANULOCYTES NFR BLD AUTO: 0.6 % (ref 0–0.5)
LYMPHOCYTES # BLD AUTO: 0.3 K/UL (ref 1–4.8)
LYMPHOCYTES NFR BLD: 3 % (ref 18–48)
MAGNESIUM SERPL-MCNC: 2.1 MG/DL (ref 1.6–2.6)
MCH RBC QN AUTO: 31.2 PG (ref 27–31)
MCHC RBC AUTO-ENTMCNC: 30.6 G/DL (ref 32–36)
MCV RBC AUTO: 102 FL (ref 82–98)
MONOCYTES # BLD AUTO: 0.1 K/UL (ref 0.3–1)
MONOCYTES NFR BLD: 1 % (ref 4–15)
NEUTROPHILS # BLD AUTO: 10 K/UL (ref 1.8–7.7)
NEUTROPHILS NFR BLD: 95.3 % (ref 38–73)
NRBC BLD-RTO: 0 /100 WBC
PHOSPHATE SERPL-MCNC: 3.2 MG/DL (ref 2.7–4.5)
PLATELET # BLD AUTO: 195 K/UL (ref 150–450)
PMV BLD AUTO: 10 FL (ref 9.2–12.9)
POTASSIUM SERPL-SCNC: 5.4 MMOL/L (ref 3.5–5.1)
PROT SERPL-MCNC: 6.8 G/DL (ref 6–8.4)
RBC # BLD AUTO: 2.5 M/UL (ref 4–5.4)
SODIUM SERPL-SCNC: 126 MMOL/L (ref 136–145)
WBC # BLD AUTO: 10.48 K/UL (ref 3.9–12.7)

## 2022-12-12 PROCEDURE — G0257 UNSCHED DIALYSIS ESRD PT HOS: HCPCS

## 2022-12-12 PROCEDURE — 94799 UNLISTED PULMONARY SVC/PX: CPT

## 2022-12-12 PROCEDURE — 80053 COMPREHEN METABOLIC PANEL: CPT | Performed by: INTERNAL MEDICINE

## 2022-12-12 PROCEDURE — 27000221 HC OXYGEN, UP TO 24 HOURS

## 2022-12-12 PROCEDURE — 84100 ASSAY OF PHOSPHORUS: CPT | Performed by: INTERNAL MEDICINE

## 2022-12-12 PROCEDURE — 25000003 PHARM REV CODE 250: Performed by: INTERNAL MEDICINE

## 2022-12-12 PROCEDURE — 36415 COLL VENOUS BLD VENIPUNCTURE: CPT | Performed by: INTERNAL MEDICINE

## 2022-12-12 PROCEDURE — 85025 COMPLETE CBC W/AUTO DIFF WBC: CPT | Performed by: INTERNAL MEDICINE

## 2022-12-12 PROCEDURE — 63600175 PHARM REV CODE 636 W HCPCS: Performed by: INTERNAL MEDICINE

## 2022-12-12 PROCEDURE — 25000242 PHARM REV CODE 250 ALT 637 W/ HCPCS: Performed by: NURSE PRACTITIONER

## 2022-12-12 PROCEDURE — 99900035 HC TECH TIME PER 15 MIN (STAT)

## 2022-12-12 PROCEDURE — 94640 AIRWAY INHALATION TREATMENT: CPT

## 2022-12-12 PROCEDURE — G0378 HOSPITAL OBSERVATION PER HR: HCPCS

## 2022-12-12 PROCEDURE — 83735 ASSAY OF MAGNESIUM: CPT | Performed by: INTERNAL MEDICINE

## 2022-12-12 PROCEDURE — 25000003 PHARM REV CODE 250: Performed by: NURSE PRACTITIONER

## 2022-12-12 PROCEDURE — 96376 TX/PRO/DX INJ SAME DRUG ADON: CPT | Mod: 59

## 2022-12-12 PROCEDURE — 94761 N-INVAS EAR/PLS OXIMETRY MLT: CPT

## 2022-12-12 RX ORDER — MUPIROCIN 20 MG/G
OINTMENT TOPICAL 2 TIMES DAILY
Qty: 22 G | Refills: 0 | Status: SHIPPED | OUTPATIENT
Start: 2022-12-12 | End: 2022-12-23

## 2022-12-12 RX ORDER — ATORVASTATIN CALCIUM 80 MG/1
80 TABLET, FILM COATED ORAL DAILY
Qty: 30 TABLET | Refills: 0 | Status: SHIPPED | OUTPATIENT
Start: 2022-12-12 | End: 2023-08-18

## 2022-12-12 RX ORDER — PREDNISONE 20 MG/1
TABLET ORAL
Qty: 15 TABLET | Refills: 0 | Status: SHIPPED | OUTPATIENT
Start: 2022-12-12 | End: 2022-12-24

## 2022-12-12 RX ORDER — CEFDINIR 300 MG/1
300 CAPSULE ORAL 2 TIMES DAILY
Qty: 8 CAPSULE | Refills: 0 | Status: SHIPPED | OUTPATIENT
Start: 2022-12-12 | End: 2022-12-16

## 2022-12-12 RX ORDER — AZITHROMYCIN 250 MG/1
500 TABLET, FILM COATED ORAL DAILY
Status: DISCONTINUED | OUTPATIENT
Start: 2022-12-12 | End: 2022-12-12 | Stop reason: HOSPADM

## 2022-12-12 RX ORDER — AZITHROMYCIN 500 MG/1
500 TABLET, FILM COATED ORAL DAILY
Qty: 4 TABLET | Refills: 0 | Status: SHIPPED | OUTPATIENT
Start: 2022-12-12 | End: 2022-12-16

## 2022-12-12 RX ORDER — ONDANSETRON 4 MG/1
4 TABLET, ORALLY DISINTEGRATING ORAL EVERY 6 HOURS PRN
Qty: 28 TABLET | Refills: 0 | Status: SHIPPED | OUTPATIENT
Start: 2022-12-12 | End: 2023-04-25

## 2022-12-12 RX ADMIN — NEPHROCAP 1 CAPSULE: 1 CAP ORAL at 08:12

## 2022-12-12 RX ADMIN — MUPIROCIN: 20 OINTMENT TOPICAL at 08:12

## 2022-12-12 RX ADMIN — METHYLPREDNISOLONE SODIUM SUCCINATE 80 MG: 40 INJECTION, POWDER, FOR SOLUTION INTRAMUSCULAR; INTRAVENOUS at 06:12

## 2022-12-12 RX ADMIN — EPOETIN ALFA-EPBX 5000 UNITS: 3000 INJECTION, SOLUTION INTRAVENOUS; SUBCUTANEOUS at 12:12

## 2022-12-12 RX ADMIN — METHYLPREDNISOLONE SODIUM SUCCINATE 80 MG: 40 INJECTION, POWDER, FOR SOLUTION INTRAMUSCULAR; INTRAVENOUS at 12:12

## 2022-12-12 RX ADMIN — IPRATROPIUM BROMIDE AND ALBUTEROL SULFATE 3 ML: 2.5; .5 SOLUTION RESPIRATORY (INHALATION) at 04:12

## 2022-12-12 RX ADMIN — ATORVASTATIN CALCIUM 80 MG: 40 TABLET, FILM COATED ORAL at 08:12

## 2022-12-12 NOTE — ASSESSMENT & PLAN NOTE
-Monitor BP=LOWER RANGE of normal w/o bp meds  -Cover with hydralazine 10mg iv prn for SBP> 160  -Adjust BP meds prn  -Held Norvasc 5 mg daily  -held metoprol tart 25mg po bid  -s/p Albumin 25gm IV once on 12/11/22

## 2022-12-12 NOTE — PT/OT/SLP PROGRESS
Occupational Therapy  Visit Attempt    Patient Name:  Katie Quevedo   MRN:  [id number]    Patient not seen today secondary to Dialysis.     12/12/2022

## 2022-12-12 NOTE — PROGRESS NOTES
[facility]                    Pharmacy       Discharge Medication Education    Patient ACCEPTED medication education. Pharmacy has provided education on the name, indication, and possible side effects of the medication(s) prescribed, using teach-back method.     The following medications have also been discussed, during this admission.        Medication List        START taking these medications      azithromycin 500 MG tablet  Commonly known as: ZITHROMAX  Take 1 tablet (500 mg total) by mouth once daily. for 4 days     cefdinir 300 MG capsule  Commonly known as: OMNICEF  Take 1 capsule (300 mg total) by mouth 2 (two) times daily. for 4 days     MUCINEX DM  mg per 12 hr tablet  Generic drug: dextromethorphan-guaiFENesin  mg  Take 1 tablet by mouth 2 (two) times daily. for 10 days     mupirocin 2 % ointment  Commonly known as: BACTROBAN  Apply  Nasal route 2 (two) times daily. for 7 days     ondansetron 4 MG Tbdl  Commonly known as: ZOFRAN-ODT  Dissolve 1 tablet (4 mg total) by mouth every 6 (six) hours as needed (nausea).     predniSONE 20 MG tablet  Commonly known as: DELTASONE  Take 2 tablets (40 mg total) by mouth once daily for 3 days, THEN 1.5 tablets (30 mg total) once daily for 3 days, THEN 1 tablet (20 mg total) once daily for 3 days, THEN 0.5 tablets (10 mg total) once daily for 3 days.  Start taking on: December 12, 2022     TRIPHROCAPS 1 mg Cap  Generic drug: vitamin renal formula (B-complex-vitamin c-folic acid)  Take 1 capsule by mouth once daily.  Start taking on: December 13, 2022            CONTINUE taking these medications      acetaminophen 325 MG tablet  Commonly known as: TYLENOL  Take 1 tablet (325 mg total) by mouth every 6 (six) hours as needed for Pain.     albuterol 90 mcg/actuation inhaler  Commonly known as: VENTOLIN HFA  Inhale 2 puffs into the lungs every 6 (six) hours as needed for Wheezing or Shortness of Breath. Rescue     albuterol-ipratropium 2.5  mg-0.5 mg/3 mL nebulizer solution  Commonly known as: DUO-NEB  Take 3 mLs by nebulization every 6 (six) hours as needed for Shortness of Breath. Rescue     allopurinoL 100 MG tablet  Commonly known as: ZYLOPRIM  Take 1 tablet (100 mg total) by mouth on Tuesday, Thursday, Saturday, and Sunday.     atorvastatin 80 MG tablet  Commonly known as: LIPITOR     busPIRone 10 MG tablet  Commonly known as: BUSPAR  Take 1 tablet (10 mg total) by mouth 2 (two) times daily.     diclofenac sodium 1 % Gel  Commonly known as: VOLTAREN     DULoxetine 30 MG capsule  Commonly known as: CYMBALTA  Take 1 capsule (30 mg total) by mouth once daily.     epoetin hemant-epbx 10,000 unit/mL imjection  Commonly known as: RETACRIT  Inject 0.5 mLs (5,000 Units total) into the skin every Mon, Wed, Fri.     heparin, porcine (PF) 100 unit/mL Syrg  Commonly known as: heparin flush 100 units/mL     megestroL 40 MG Tab  Commonly known as: MEGACE     ondansetron 4 mg/2 mL Soln     sevelamer carbonate 800 mg Tab  Commonly known as: RENVELA  Take 1 tablet (800 mg total) by mouth after meals as needed.     TRELEGY ELLIPTA 200-62.5-25 mcg inhaler  Generic drug: fluticasone-umeclidin-vilanter  Inhale 1 puff into the lungs once daily.            STOP taking these medications      amLODIPine 5 MG tablet  Commonly known as: NORVASC     dexAMETHasone 4 mg/mL injection  Commonly known as: DECADRON     diphenhydrAMINE 25 mg capsule  Commonly known as: BENADRYL     HYDROcodone-acetaminophen  mg per tablet  Commonly known as: NORCO     LORazepam 0.5 MG tablet  Commonly known as: ATIVAN     metoprolol tartrate 25 MG tablet  Commonly known as: LOPRESSOR               Where to Get Your Medications        These medications were sent to [facility] [address], [place] [id number]      Hours: Mon-Fri, 8a-5:30p Phone: [phone]   azithromycin 500 MG tablet  cefdinir 300 MG capsule  MUCINEX DM  mg per 12 hr tablet  mupirocin 2 %  ointment  ondansetron 4 MG Tbdl  predniSONE 20 MG tablet  TRIPHROCAPS 1 mg Cap          Thank you  Malik Majano, PharmD  [phone]

## 2022-12-12 NOTE — DISCHARGE SUMMARY
[facility] Medicine  Discharge Summary      Patient Name: Katie Quevedo  MRN: [id number]  ARIADNA: [id number]  Patient Class: OP- Observation  Admission Date: 12/9/2022  Hospital Length of Stay: 0 days  Discharge Date and Time:  12/12/2022 11:10 AM  Attending Physician: Emma Hansen MD   Discharging Provider: Emma Hansen MD  Primary Care Provider: Kingston Verduzco MD    Primary Care Team: [facility] PHYSICIAN TEAM 2    HPI:   77 y.o. female who has a past medical history of Acute congestive heart failure, Anemia, Bilateral renal cysts, Cataract, Chronic LBP, Chronic pain, Lumbar spondylosis, CKD stage IV, Colon polyp, COPD, Dehydration, HTN,  Melanoma, Metabolic bone disease, Migraines, neuralgic, Osteoporosis, Primary osteoarthritis of both knees, Pulmonary embolism with infarction, Seizures, Subdeltoid bursitis, L>R, Ulcer, and Vitamin D deficiency disease.  She presents to the ED due to Shortness of Breath. She is a dialysis patient on 3L NC of O2 with last dialysis today.  Reports Shortness of Breath that started three days ago with associated cough and congestion. Patient reports they were able to get 1.8 fluids when she usually gets 1.5 out. She denies chest pain, abdominal pain, vomiting, diarrhea, dysuria, fever, and other associated factors. No known sick contacts recently.  No alleviating factors reported. ED findings: Chest xray showing chronic appearing interstitial findings, increased parenchymal attenuation projects over the bilateral lower lung zones, also similar to the previous examination noting small left pleural effusion. Hypokalemia, elevated CRP, , procal 0.31, covid negative, flu negative, u/a pending, blood cultures pending.  Patient being admitted to hospital medicine observation for further medical management of Acute on chronic respiratory failure, COPD exacerbation and possible developing pneumonia.       * No surgery found *      Hospital Course:    No notes on file     Goals of Care Treatment Preferences:  Code Status: Full Code       LaPOST: Yes  What is most important right now is to focus on spending time at home, avoiding the hospital, remaining as independent as possible, improvement in condition but with limits to invasive therapies.  Accordingly, we have decided that the best plan to meet the patient's goals includes continuing with treatment.      Consults:   Consults (From admission, onward)        Status Ordering Provider     Inpatient consult to Social Work  Once        Provider:  (Not yet assigned)    Acknowledged AMY THOMPSON     IP consult to case management  Once        Provider:  (Not yet assigned)    Acknowledged BERTA GREEN     Inpatient consult to [facility] (Caterina Bustillos, Paula)  Once        Provider:  (Not yet assigned)    Completed JESSICA BROWN          * Acute on chronic respiratory failure with hypoxia  CAP (community acquired pneumonia)  Severe stage 4 COPD with acute exacerbation due to CAP    Continuous cardiac monitoring  -improving clinically  O2 sats stable on 3 L NC CURRENTLY (this is her baseline pr patient)  duonebs  Methylprednisolone IV..SWITCH TO ORAL taper on dc  Patient on trilegy inhaler at home, will resume on discharge   -Oxygen Supplementation if necessary  -Continue HD management of heart failure     CAP (community acquired pneumonia)  -poa  -improving clinically  -afebrile  -no leucocytosis  -CXR on 12/9/22=1. Chronic appearing interstitial findings.  Increased parenchymal attenuation projects over the bilateral lower lung zones, also similar to the previous examination noting small left pleural effusion.  Correlation with any history of COPD/emphysema.  -Blood cx x 2 on 12/9/22=NEG X 48HRS  -Nebs  -IS q2 hr when awake  -zithromax daily x 5 days. Ordered on 12/10/22  -Rocephin daily x 5 days. Ordered on 12/10/22      severe stage 4 COPD  -as above  -op f/u with  PULM      Physical deconditioning  -fall precautions  -PT OT  -pt declines placement of any sort. She states she already has caregivers at home. She does not want any additional help on dc    Hypokalemia  -monitor and replace prn  -Patient with ESRD        Adjustment reaction with anxiety and depression  resume buspirone and cymbalta       ESRD (end stage renal disease) on dialysis  On HD MWF  Strict I&O  Avoid nephrotoxic agents  Renally dose meds  Consulted nephrology =follow recs  Continue allopurinol and phosphate binder   HD today prior to dc      Chronic diastolic heart failure  No acute exacerbation  Continuous cardiac monitoring   Continue HD fluid management   Most recent Echo with EF 75%  Daily Weight              Essential hypertension  -Monitor BP=LOWER RANGE of normal w/o bp meds  -Cover with hydralazine 10mg iv prn for SBP> 160  -Adjust BP meds prn  -Held Norvasc 5 mg daily  -held metoprol tart 25mg po bid  -s/p Albumin 25gm IV once on 12/11/22      Chronic pain  Cover with pain meds prn   Caution with narcotic pain meds      Final Active Diagnoses:    Diagnosis Date Noted POA    PRINCIPAL PROBLEM:  Acute on chronic respiratory failure with hypoxia [J96.21] 03/05/2018 Yes    CAP (community acquired pneumonia) [J18.9] 09/23/2017 Yes    severe stage 4 COPD [J44.1] 10/13/2022 Yes    Physical deconditioning [R53.81] 12/10/2022 Yes    Hypokalemia [E87.6] 12/09/2022 Yes    Adjustment reaction with anxiety and depression [F43.23] 11/01/2022 Yes    ESRD (end stage renal disease) on dialysis [N18.6, Z99.2] 02/19/2020 Not Applicable     Chronic    Chronic diastolic heart failure [I50.32] 10/30/2019 Yes    Essential hypertension [I10] 01/22/2018 Yes     Chronic    Chronic pain [G89.29]  Yes      Problems Resolved During this Admission:       Discharged Condition: stable    Disposition: Home or Self Care    Follow Up:   Follow-up Information     Kingston Verudzco MD. Schedule an appointment as soon as  possible for a visit in 1 week(s).    Specialty: Family Medicine  Contact information:  [address]  [address]  [place] [address] [phone]                       Patient Instructions:      Ambulatory referral/consult to Pulmonology   Standing Status: Future   Referral Priority: Routine Referral Type: Consultation   Referral Reason: Specialty Services Required   Requested Specialty: Pulmonary Disease   Number of Visits Requested: 1     Diet Cardiac     Diet renal     Activity as tolerated       Pending Diagnostic Studies:     None         Medications:  Reconciled Home Medications:      Medication List      START taking these medications    azithromycin 500 MG tablet  Commonly known as: ZITHROMAX  Take 1 tablet (500 mg total) by mouth once daily. for 4 days     cefdinir 300 MG capsule  Commonly known as: OMNICEF  Take 1 capsule (300 mg total) by mouth 2 (two) times daily. for 4 days     dextromethorphan-guaiFENesin  mg  mg per 12 hr tablet  Commonly known as: MUCINEX DM  Take 1 tablet by mouth 2 (two) times daily. for 10 days     mupirocin 2 % ointment  Commonly known as: BACTROBAN  by Nasal route 2 (two) times daily. for 7 days     predniSONE 20 MG tablet  Commonly known as: DELTASONE  Take 2 tablets (40 mg total) by mouth once daily for 3 days, THEN 1.5 tablets (30 mg total) once daily for 3 days, THEN 1 tablet (20 mg total) once daily for 3 days, THEN 0.5 tablets (10 mg total) once daily for 3 days.  Start taking on: December 12, 2022     vitamin renal formula (B-complex-vitamin c-folic acid) 1 mg Cap  Commonly known as: NEPHROCAP  Take 1 capsule by mouth once daily.  Start taking on: December 13, 2022        CONTINUE taking these medications    acetaminophen 325 MG tablet  Commonly known as: TYLENOL  Take 1 tablet (325 mg total) by mouth every 6 (six) hours as needed for Pain.     albuterol 90 mcg/actuation inhaler  Commonly known as: VENTOLIN HFA  Inhale 2 puffs into the lungs every 6 (six)  hours as needed for Wheezing or Shortness of Breath. Rescue     albuterol-ipratropium 2.5 mg-0.5 mg/3 mL nebulizer solution  Commonly known as: DUO-NEB  Take 3 mLs by nebulization every 6 (six) hours as needed for Shortness of Breath. Rescue     allopurinoL 100 MG tablet  Commonly known as: ZYLOPRIM  Take 1 tablet (100 mg total) by mouth on Tuesday, Thursday, Saturday, and Sunday.     atorvastatin 80 MG tablet  Commonly known as: LIPITOR  Take 80 mg by mouth once daily.     busPIRone 10 MG tablet  Commonly known as: BUSPAR  Take 1 tablet (10 mg total) by mouth 2 (two) times daily.     diclofenac sodium 1 % Gel  Commonly known as: VOLTAREN  Apply 2 g topically 3 (three) times daily.     DULoxetine 30 MG capsule  Commonly known as: CYMBALTA  Take 1 capsule (30 mg total) by mouth once daily.     epoetin hemant-epbx 10,000 unit/mL imjection  Commonly known as: RETACRIT  Inject 0.5 mLs (5,000 Units total) into the skin every Mon, Wed, Fri.     heparin, porcine (PF) 100 unit/mL Syrg  Commonly known as: heparin flush 100 units/mL     megestroL 40 MG Tab  Commonly known as: MEGACE  Take 40 mg by mouth once daily.     ondansetron 4 mg/2 mL Soln     sevelamer carbonate 800 mg Tab  Commonly known as: RENVELA  Take 1 tablet (800 mg total) by mouth after meals as needed.     TRELEGY ELLIPTA 200-62.5-25 mcg inhaler  Generic drug: fluticasone-umeclidin-vilanter  Inhale 1 puff into the lungs once daily.        STOP taking these medications    amLODIPine 5 MG tablet  Commonly known as: NORVASC     dexAMETHasone 4 mg/mL injection  Commonly known as: DECADRON     diphenhydrAMINE 25 mg capsule  Commonly known as: BENADRYL     HYDROcodone-acetaminophen  mg per tablet  Commonly known as: NORCO     LORazepam 0.5 MG tablet  Commonly known as: ATIVAN     metoprolol tartrate 25 MG tablet  Commonly known as: LOPRESSOR            Indwelling Lines/Drains at time of discharge:   Lines/Drains/Airways     Central Venous Catheter Line  Duration                 Hemodialysis AV Graft Left upper arm -- days    Permacath right subclavian -- days                Time spent on the discharge of patient: 31 minutes         Emma Hansen MD  Department of Hospital Medicine  [place] - Telemetry

## 2022-12-12 NOTE — PROGRESS NOTES
Pharmacist Intervention IV to PO Note    Katie Quevedo is a 77 y.o. female being treated with IV medication azithromycin    Patient Data:    Vital Signs (Most Recent):  Temp: 97.5 °F (36.4 °C) (12/12/22 0731)  Pulse: 100 (12/12/22 0731)  Resp: 17 (12/12/22 0731)  BP: (!) 110/56 (12/12/22 0731)  SpO2: 97 % (12/12/22 0846)   Vital Signs (72h Range):  Temp:  [96.1 °F (35.6 °C)-99.6 °F (37.6 °C)]   Pulse:  []   Resp:  [14-37]   BP: ()/(48-78)   SpO2:  [86 %-100 %]      CBC:  Recent Labs   Lab 12/10/22  0319 12/11/22  0330 12/12/22  0342   WBC 8.64 9.46 10.48   RBC 3.04* 2.79* 2.50*   HGB 9.4* 8.6* 7.8*   HCT 30.7* 28.0* 25.5*    212 195   * 100* 102*   MCH 30.9 30.8 31.2*   MCHC 30.6* 30.7* 30.6*     CMP:     Recent Labs   Lab 12/10/22  0319 12/11/22  0330 12/12/22  0342   * 138* 133*   CALCIUM 9.0 8.6* 8.5*   ALBUMIN 2.7* 2.6* 3.1*   PROT 7.6 6.9 6.8    134* 126*   K 3.7 3.8 5.4*   CO2 26 20* 19*    100 95   BUN 8 33* 58*   CREATININE 1.4 2.4* 3.5*   ALKPHOS 256* 218* 168*   ALT 8* 16 16   AST 12 17 25   BILITOT 0.4 0.2 0.2       Dietary Orders:  Diet Orders            Diet renal: Renal starting at 12/10 1141    Dietary nutrition supplements [facility]; [facility] Renal - Any flavor starting at 12/10 0800            Based on the following criteria, this patient qualifies for intravenous to oral conversion:  [x] The patients gastrointestinal tract is functioning (tolerating medications via oral or enteral route for 24 hours and tolerating food or enteral feeds for 24 hours).  [x] The patient is hemodynamically stable for 24 hours (heart rate <100 beats per minute, systolic blood pressure >99 mm Hg, and respiratory rate <20 breaths per minute).  [x] The patient shows clinical improvement (afebrile for at least 24 hours and white blood cell count downtrending or normalized). Additionally, the patient must be non-neutropenic (absolute neutrophil count >500 cells/mm3).  [x]  For antimicrobials, the patient has received IV therapy for at least 24 hours.    IV medication azithromycin will be changed to oral medication     Pharmacist's Name: Raymundo Pretty  Pharmacist's Extension: [phone]

## 2022-12-12 NOTE — NURSING
Shift assessment complete. Pt is alert and oriented x 4. Pt requests something for nausea. Daughter at bedside. 02 @ 3L NC. Bed in lowest position, locked, and side rails up x 3. Bed alarm on. Call light in reach.

## 2022-12-12 NOTE — PLAN OF CARE
Nutrition Plan of Care:    Recommendations     Recommendation/Intervention:   1.  Encourage po intake   2. Recommendations of oral supplements    3. Monitor weights and labs   4.  Collaboration with medical providers     Goals: Patient to consume >50% of EEN prior to RD follow up.  Nutrition Goal Status: new  Communication of RD Recs: other (comment) (poc)     Assessment and Plan     Nutrition Problem:  Moderate/Severe Protein-Calorie Malnutrition  Malnutrition in the context of Chronic Illness/Injury     Related to (etiology):  Conditions associated with diagnoses: ESRD on HD, COPD, Respiratory failure     Signs and Symptoms (as evidenced by):  Energy Intake: <75% of estimated energy requirement for >3 months  Body Fat Depletion: mild depletion   Muscle Mass Depletion: mild depletion         Interventions(treatment strategy):  1.  Encourage po intake   2. Recommendations of oral supplements    3. Monitor weights and labs   4.  Collaboration with medical providers     Nutrition Diagnosis Status:  New        Malnutrition Assessment  Malnutrition Type: chronic illness  Energy Intake: moderate energy intake      Micronutrient Evaluation: suspected deficiency   Energy Intake (Malnutrition): less than 75% for greater than or equal to 3 months  Subcutaneous Fat (Malnutrition): mild depletion  Muscle Mass (Malnutrition): mild depletion               Subcutaneous Fat Loss (Final Summary): mild protein-calorie malnutrition  Muscle Loss Evaluation (Final Summary): mild protein-calorie malnutrition      Carmen Hood, MS, RDN, LDN

## 2022-12-12 NOTE — PLAN OF CARE
ROSEY placed call to pt daughter Chintan [phone].   SW informed pt daughter of pt d/c status.  Chintan confirmed at time of discharge pt to be transported back home by paid caregiver Deisi at bedside. Chintan confirmed pt has assistance from Deisi everyday besides Saturday 8-6pm.   ROSEY confirmed pt has at home NP appointment scheduled 12/13 at 8am.   ROSEY confirmed with pt daughter pt has declined HH recs. ROSEY also informed daughter of how to gain services outpatient if needed.        12/12/22 1152   Post-Acute Status   Post-Acute Authorization Other   Other Status No Post-Acute Service Needs   Hospital Resources/Appts/Education Provided Appointments scheduled and added to AVS   Discharge Delays None known at this time   Discharge Plan   Discharge Plan A Home with family       Future Appointments   Date Time Provider Department Center   12/13/2022  8:00 AM Katharine Urbina, EFREN [facility] C3HV [place]   12/13/2022  9:45 AM [facility] OIC-XRAY [facility] XRAY IC Imaging Ctr   12/13/2022 10:45 AM Trevor Mahan MD [facility] ORTHO [address]   12/19/2022 11:00 AM Shanti Avendaño MD [facility]   1/17/2023 10:20 AM Jacinto Henriquez MD [facility] CARDIO [facility]   1/20/2023 11:00 AM Giancarlo Rust MD [facility] PULMSVC [address]   2/14/2023  1:00 PM Pushpa Mcmahon MD [facility]   3/23/2023  1:40 PM Meryl Sims, OD DESC [address] [address]     Rebecca Valentin [facility]  [facility] Case Management  [phone]

## 2022-12-12 NOTE — PLAN OF CARE
SW met with pt via [employer] to complete assessment. Pt is AxO x3  and able to verbally answer assessment questions.  Pt able to confirm demographic information. Pt reports while at home living with her spouse. Pt reports while at home having assistance from a private paid caregiver 8am-6pm. Pt reports needing some assistance with ADLs and having all the DME she needs to include walker, power chair, rollator. Pt reports to have all needs cared for and declined the recommendation for home health. Pt reports attending Dialysis here in [facility] and her caretaker transports to and from.At time of discharge pt reports either her daughter or caretaker to transport home. SW updated whiteboard with [facility] name and contact information. SW confirmed pt understanding of Observation unit and expected discharge plan. SW will continue to follow pt throughout care and assist with any discharge needs.         12/12/22 1040   Discharge Planning   Assessment Type Discharge Planning Brief Assessment   Resource/Environmental Concerns none   Support Systems Spouse/significant other;Children;Family members   Equipment Currently Used at Home power chair;rollator;walker, standard   Current Living Arrangements home   Patient/Family Anticipates Transition to home with family   Patient/Family Anticipated Services at Transition none   DME Needed Upon Discharge  none   Discharge Plan A Home with family       Future Appointments   Date Time Provider Department Center   12/13/2022  8:00 AM Katharine Urbina NP [address]   12/13/2022  9:45 AM [facility] OIC-XRAY [facility] XRAY IC Imaging Ctr   12/13/2022 10:45 AM Trevor Mahan MD [facility] ORTHO [address]   12/19/2022 11:00 AM Shanti Avendaño MD [facility] MED CLN [address] PCW   1/17/2023 10:20 AM Jacinto Henriquez MD [facility] CARDIO [place] [facility]   1/20/2023 11:00 AM Giancarlo Rust MD [facility] PULMSVC [address]   2/14/2023  1:00 PM Pushpa Mcmahon MD [facility] PHYSMED [address]   3/23/2023  1:40  PM Meryl Sims, OD DESC [address]     Rebecca Valentin, [facility] Case Management  [phone]

## 2022-12-12 NOTE — PLAN OF CARE
SW noted Home health recs.   SW spoke with pt via [employer] to review services.Pt instantly responded no, not interested in services. Pt reports having private paid caregiver assistance 8am -6pm.  SW informed pt that this is PT/OT in the home for safety and strength not caregiving and this service can work to support both pt and CG. Pt declined and reported having service and aware of what it entails. Pt is informed. Pt declined service.  SW educated pt on how to receive assistance from PCP if she ever is interested.        12/12/22 0940   Post-Acute Status   Post-Acute Authorization Home Health   Home Health Status Patient declined/refused   Hospital Resources/Appts/Education Provided Appointments scheduled by Navigator/Coordinator;Post-Acute resouces added to AVS   Discharge Delays None known at this time   Discharge Plan   Discharge Plan A Home with family       Future Appointments   Date Time Provider Department Center   12/13/2022  8:00 AM Katharine Urbina NP [facility] C3HV [place]   12/13/2022  9:45 AM [facility] OIC-XRAY [facility] XRAY IC Imaging Ctr   12/13/2022 10:45 AM Trevor Mahan MD [facility] ORTHO [address]   12/19/2022 11:00 AM Shanti Avendaño MD [facility] MED CLN [address] PCW   1/17/2023 10:20 AM Jacinto Henriquez MD [facility] CARDIO [facility]   1/20/2023 11:00 AM Giancarlo Rust MD [facility] PULMSVC [address]   2/14/2023  1:00 PM Pushpa Mcmahon MD [facility] PHYSMED [address]   3/23/2023  1:40 PM Meryl Sims OD DESC [address]     Rebecca Valentin, [facility] Case Management  [phone]

## 2022-12-12 NOTE — PLAN OF CARE
D/C recs noted. Pt to have follow up with PCP and Pulmonology routine. Pt already has pulm follow up scheduled. Pharmacist will go over home medications and reasons for medications. VN and bedside nurse to reiterate final discharge instructions.       At time of discharge pt will be transported home by daughter or paid caretaker.     DME at discharge: none  Home Health: none    Pt has follow up appointments added to AVS.         12/12/22 1117   Final Note   Assessment Type Final Discharge Note   Anticipated Discharge Disposition Home   What phone number can be called within the next 1-3 days to see how you are doing after discharge? [phone]   Hospital Resources/Appts/Education Provided Appointments scheduled and added to AVS   Post-Acute Status   Post-Acute Authorization Home Health   Home Health Status Patient declined/refused   Discharge Delays None known at this time       Future Appointments   Date Time Provider Department Center   12/13/2022  8:00 AM Katharine Urbina NP [facility] C3HV [place]   12/13/2022  9:45 AM [facility] OIC-XRAY N[facility] XRAY IC Imaging Ctr   12/13/2022 10:45 AM Trevor Mahan MD [facility] ORTHO [address]   12/19/2022 11:00 AM Shanti Avendaño MD [facility] MED CLN [facility] PCW   1/17/2023 10:20 AM Jacinto Henriquez MD [facility] CARDIO [place] [facility]   1/20/2023 11:00 AM Giancarlo Rust MD [facility]   2/14/2023  1:00 PM Pushpa Mcmahon MD [facility] PHYSMED [facility]   3/23/2023  1:40 PM Meryl Sims, TONY DESC [address]     Rebecca Valentin [facility] [place] Case Management  [phone]

## 2022-12-12 NOTE — AI DETERIORATION ALERT
Artificial Intelligence Notification  Joseline      Admit Date: 2022  LOS: 0  Code Status: Full Code   Date of Consult: 2022  : 1945  Age: 77 y.o.  Weight:   Wt Readings from Last 1 Encounters:   22 42.2 kg (93 lb 0.6 oz)     Sex: female  Bed: K461/K4 A:   MRN: [id number]  Attending Physician: Emma Hansen MD  Primary Service: [facility] MEDICINE PHYSICIAN TEAM 2  Time AI Alert Received: ***  Time at Bedside: ***           ***      Vital Signs (Most Recent):  Temp: 97.2 °F (36.2 °C) (22)  Pulse: 95 (22)  Resp: (Abnormal) 24 (22)  BP: (Abnormal) 92/50 (22)  SpO2: (Abnormal) 94 % (22)   Vital Signs (24h Range):  Temp:  [97.1 °F (36.2 °C)-98.2 °F (36.8 °C)] 97.2 °F (36.2 °C)  Pulse:  [] 95  Resp:  [14-24] 24  SpO2:  [93 %-99 %] 94 %  BP: (92-98)/(48-56) 92/50         This encounter was triggered by an Artificial Intelligence Notification.     Artificial Intelligence alert discussed with Primary team:  Name ***      Evaluation: ***    Disposition: ***

## 2022-12-12 NOTE — PLAN OF CARE
Discharge orders noted. Additional clinical references attached. Patient's discharge instructions given by bedside RN and reviewed by this VN with patient. Education provided on home care instructions, new and previous medications, diagnosis, when to seek medical attention, and follow-up appointments. New medications delivered by pharmacy. Patient verbalized understanding and teach back method was used. Patient's family to provide ride/transportation home. All questions answered. Waiting on ride home. Floor nurse notified.

## 2022-12-12 NOTE — PROGRESS NOTES
Hemodialysis done with decreased UF/enma tx well without c/o. Flushed both ports to right chest wall perm cvc, heparin locked, caps applied. Report given to ANGELA Hernandez RN/pt returned to room 461 via bed per transporter.    Net fluid removed 1500 ml as tolerated

## 2022-12-12 NOTE — PROGRESS NOTES
[facility]  Adult Nutrition  Progress Note    SUMMARY       Recommendations    Recommendation/Intervention:   1.  Encourage po intake   2. Recommendations of oral supplements    3. Monitor weights and labs   4.  Collaboration with medical providers    Goals: Patient to consume >50% of EEN prior to RD follow up.  Nutrition Goal Status: new  Communication of RD Recs: other (comment) (poc)    Assessment and Plan    Nutrition Problem:  Moderate/Severe Protein-Calorie Malnutrition  Malnutrition in the context of Chronic Illness/Injury    Related to (etiology):  Conditions associated with diagnoses: ESRD on HD, COPD, Respiratory failure    Signs and Symptoms (as evidenced by):  Energy Intake: <75% of estimated energy requirement for >3 months  Body Fat Depletion: mild depletion   Muscle Mass Depletion: mild depletion       Interventions(treatment strategy):  1.  Encourage po intake   2. Recommendations of oral supplements    3. Monitor weights and labs   4.  Collaboration with medical providers    Nutrition Diagnosis Status:  New      Malnutrition Assessment  Malnutrition Type: chronic illness  Energy Intake: moderate energy intake      Micronutrient Evaluation: suspected deficiency   Energy Intake (Malnutrition): less than 75% for greater than or equal to 3 months  Subcutaneous Fat (Malnutrition): mild depletion  Muscle Mass (Malnutrition): mild depletion               Subcutaneous Fat Loss (Final Summary): mild protein-calorie malnutrition  Muscle Loss Evaluation (Final Summary): mild protein-calorie malnutrition         Reason for Assessment    Reason For Assessment: identified at risk by screening criteria, low BMI  Diagnosis: cardiac disease, renal disease, pulmonary disease  Patient Active Problem List   Diagnosis    Melanoma    Chronic pain    Vitamin deficiency    Cervical post-laminectomy syndrome    Lumbar postlaminectomy syndrome    Lumbosacral spondylosis without myelopathy    Isolated cervical  dystonia    Primary osteoarthritis of both knees    Subdeltoid bursitis, L>R.    Other fragments of torsion dystonia    Nuclear sclerosis - Both Eyes    Rotator cuff tear, right    Biceps tendonitis    Osteoarthritis, hip, bilateral    Lumbar radiculopathy, BLE    Cervical radiculopathy, BUE    Osteoporosis    CAP (community acquired pneumonia)    Iron deficiency anemia    Essential hypertension    Atrophy of left kidney    Kidney cysts    History of colon polyps    Acute on chronic respiratory failure with hypoxia    Pleural effusion, left    Pericardial effusion    Shortness of breath    Lipoma of torso    Chronic diastolic heart failure    Pulmonary cachexia due to COPD    Nausea    Clotted renal dialysis arteriovenous graft    Diastolic dysfunction, left ventricle    Acute congestive heart failure    ESRD (end stage renal disease) on dialysis    Palliative care encounter    Diarrhea    Dialysis AV fistula malfunction, sequela    Medication management    SOB (shortness of breath)    Acute on chronic heart failure    Pleural effusion    Orthopnea    Secondary hyperparathyroidism    Clotted dialysis access    Acute on chronic respiratory failure    Influenza A virus present    severe stage 4 COPD    Chronic respiratory failure    Hypotension    Hematemesis of unknown cause    Advance care planning    Adjustment reaction with anxiety and depression    Hypokalemia    Physical deconditioning       Interdisciplinary Rounds: did not attend (RD remote)    General Information Comments:   12/12: Patient with renal diet and renal ONS.  Patient with decreased po intake at or below 50%.   BMI<18.5.  Medical chart weight history shows underweight for the previous year.  Patient with COPD and ESRD on HD.  Labs reviewed. NKFA.  LBM:12/11.  RD to continue to monitor po intake, tolerance, labs and weights.    Nutrition Discharge Planning: Patient to consume adequate po intake prior and post discharge.    Nutrition Risk  "Screen    Nutrition Risk Screen: reduced oral intake over the last month    Nutrition/Diet History    Spiritual, Cultural Beliefs, Shinto Practices, Values that Affect Care: no  Food Allergies: NKFA  Factors Affecting Nutritional Intake: decreased appetite    Anthropometrics    Temp: 97.8 °F (36.6 °C)  Height Method: Stated  Height: 5' 2" (157.5 cm)  Height (inches): 62 in  Weight Method: Bed Scale  Weight: 42.2 kg (93 lb 0.6 oz)  Weight (lb): 93.04 lb  Ideal Body Weight (IBW), Female: 110 lb  % Ideal Body Weight, Female (lb): 84.58 %  BMI (Calculated): 17  BMI Grade: 17 - 18.4 protein-energy malnutrition grade I       Lab/Procedures/Meds    Pertinent Labs Reviewed: reviewed  Pertinent Medications Reviewed: reviewed  BMP  Lab Results   Component Value Date     (L) 12/12/2022    K 5.4 (H) 12/12/2022    CL 95 12/12/2022    CO2 19 (L) 12/12/2022    BUN 58 (H) 12/12/2022    CREATININE 3.5 (H) 12/12/2022    CALCIUM 8.5 (L) 12/12/2022    ANIONGAP 12 12/12/2022    EGFRNORACEVR 13 (A) 12/12/2022     Lab Results   Component Value Date    HGBA1C 5.2 01/22/2018     Lab Results   Component Value Date    LABPROT 11.5 11/10/2020    ALBUMIN 3.1 (L) 12/12/2022     Scheduled Meds:   sodium chloride 0.9%   Intravenous Once    allopurinoL  100 mg Oral Every Tues, Thurs, Sat, Sun    atorvastatin  80 mg Oral Daily    azithromycin  500 mg Oral Daily    cefTRIAXone (ROCEPHIN) IVPB  1 g Intravenous Q24H    dextromethorphan-guaiFENesin  mg  1 tablet Oral BID    epoetin hemant-epbx  5,000 Units Intravenous Every Mon, Wed, Fri    methylPREDNISolone sodium succinate injection  80 mg Intravenous Q6H    mupirocin   Nasal BID    vitamin renal formula (B-complex-vitamin c-folic acid)  1 capsule Oral Daily     Continuous Infusions:  PRN Meds:.acetaminophen, albuterol-ipratropium, aluminum-magnesium hydroxide-simethicone, heparin (porcine), hydrALAZINE, lactulose, melatonin, ondansetron, sodium chloride 0.9%, sodium chloride " 0.9%    Physical Findings/Assessment         Estimated/Assessed Needs    Weight Used For Calorie Calculations: 42.2 kg (93 lb 0.6 oz)  Energy Calorie Requirements (kcal): 25-35kcals/kg (1055-1477kcals/day)  Energy Need Method: Kcal/kg  Protein Requirements: 1.2-1.5g/kg (ESRD)  Weight Used For Protein Calculations: 42.2 kg (93 lb 0.6 oz)  Fluid Requirements (mL): 1000+output  Estimated Fluid Requirement Method: other (see comments) (ESRD on HD)  RDA Method (mL): 25  CHO Requirement: 200      Nutrition Prescription Ordered    Current Diet Order: Renal  Oral Nutrition Supplement: Renal ONS    Evaluation of Received Nutrient/Fluid Intake  Last Bowel Movement: 12/11/22    % Kcal Needs: 25-50  % Protein Needs: 25-50  Energy Calories Required: not meeting needs  Protein Required: not meeting needs  Fluid Required: not meeting needs  Tolerance: tolerating  % Intake of Estimated Energy Needs: 25 - 50 %  % Meal Intake: 25 - 50 %  Intake/Output - Last 3 Shifts         12/10 0700 12/11 0659 12/11 0700 12/12 0659 12/12 0700  12/13 0659    P.O.  600 360    IV Piggyback 327.8      Total Intake(mL/kg) 327.8 (7.8) 600 (14.2) 360 (8.5)    Urine (mL/kg/hr)  0 (0)     Stool  0     Total Output  0     Net +327.8 +600 +360           Urine Occurrence 2 x 1 x     Stool Occurrence 1 x      Emesis Occurrence 1 x              Nutrition Risk    Level of Risk/Frequency of Follow-up: moderate     Monitor and Evaluation    Food and Nutrient Intake: energy intake, food and beverage intake  Food and Nutrient Adminstration: diet order  Knowledge/Beliefs/Attitudes: food and nutrition knowledge/skill, beliefs and attitudes  Physical Activity and Function: nutrition-related ADLs and IADLs, factors affecting access to physical activity  Anthropometric Measurements: height/length, weight change, weight, body mass index  Biochemical Data, Medical Tests and Procedures: inflammatory profile, glucose/endocrine profile, gastrointestinal profile, electrolyte  and renal panel, lipid profile  Nutrition-Focused Physical Findings: overall appearance, other (specify) (decreased po intake, BMI<18.5)     Nutrition Follow-Up    RD Follow-up?: Yes  Carmen Hood MS, RDN, LDN

## 2022-12-12 NOTE — NURSING
12/11/22 2017   Patient Request   Patient Requested AI ALERT   Nurse Notification   Charge Nurse Notified? Yes   Name of Charge Nurse DANIEL Bustillos   Bedside Nurse Notified? Yes   Name of Bedside Nurse DANIEL Page   Nurse Notfication Method Secure Chat   Nurse Notified Of Other  (AI ALERT)   Provider Notification   Provider Notified? Yes   Name of Provider Teresa Swenson PA-C   Provider Notification Method Secure Chat   Provider Notified Of AI Deterioration Alert     Temp: 97.2 °F (36.2 °C) (12/11/22 1624)  Pulse: 95 (12/11/22 2013)  Resp: (Abnormal) 24 (12/11/22 2013)  BP: (Abnormal) 92/50 (12/11/22 1624)  SpO2: (Abnormal) 94 % (12/11/22 2013)      Vitals retaken:     12/11/22 2021  Vital Signs  Temp 96.7 °F (35.9 °C)  Temp src Axillary  Pulse 89  Heart Rate Source Monitor  Resp 14  SpO2 100 %  BP (!) 84/50  MAP (mmHg) 62  Patient Position Lying

## 2022-12-12 NOTE — PROGRESS NOTES
IV removed intact, tele box removed. Pt left unit in wheelchair with ANGELA Hernandez LPN. no acute signs of distress noted.

## 2022-12-12 NOTE — ASSESSMENT & PLAN NOTE
-fall precautions  -PT OT  -pt declines placement of any sort. She states she already has caregivers at home. She does not want any additional help on dc

## 2022-12-12 NOTE — ASSESSMENT & PLAN NOTE
On HD MWF  Strict I&O  Avoid nephrotoxic agents  Renally dose meds  Consulted nephrology =follow recs  Continue allopurinol and phosphate binder   HD today prior to dc

## 2022-12-12 NOTE — PROGRESS NOTES
Received report per Stephen RN/pt arrived to [facility] via transporter. Right chest wall cvc aspirated/flushed without difficulty noted. Hemodialysis started x 3 hours. Decreased UF d/t sbp <100 prior to start of HD. UF goal set to remove 1.5 L as tolerated. Pt without c/o at this time.

## 2022-12-12 NOTE — PROCEDURES
"Patient seen and examined on HD tolerating well. No complains.   BP (!) 110/56 (BP Location: Right arm, Patient Position: Sitting)   Pulse 100   Temp 97.5 °F (36.4 °C)   Resp 17   Ht 5' 2" (1.575 m)   Wt 42.2 kg (93 lb 0.6 oz)   LMP  (LMP Unknown)   SpO2 97%   Breastfeeding No   BMI 17.02 kg/m²     With any question please call answering service [phone]  Ramo Da Silva MD    [facility]  EDGARD Bustillos MD, FACP,   MJae Diggs MD,   MD CURTIS Benoit MD E. V. Harmon, NP    [address]  [place], [address]   "

## 2022-12-13 ENCOUNTER — CARE AT HOME (OUTPATIENT)
Dept: HOME HEALTH SERVICES | Facility: CLINIC | Age: 77
End: 2022-12-13
Payer: MEDICARE

## 2022-12-13 DIAGNOSIS — N18.6 ESRD (END STAGE RENAL DISEASE) ON DIALYSIS: Chronic | ICD-10-CM

## 2022-12-13 DIAGNOSIS — I70.0 AORTIC ATHEROSCLEROSIS: Primary | ICD-10-CM

## 2022-12-13 DIAGNOSIS — J18.9 COMMUNITY ACQUIRED PNEUMONIA, UNSPECIFIED LATERALITY: ICD-10-CM

## 2022-12-13 DIAGNOSIS — F43.23 ADJUSTMENT REACTION WITH ANXIETY AND DEPRESSION: ICD-10-CM

## 2022-12-13 DIAGNOSIS — Z99.2 ESRD (END STAGE RENAL DISEASE) ON DIALYSIS: Chronic | ICD-10-CM

## 2022-12-13 LAB
OHS CV EVENT MONITOR DAY: 0
OHS CV HOLTER LENGTH DECIMAL HOURS: 48
OHS CV HOLTER LENGTH HOURS: 48
OHS CV HOLTER LENGTH MINUTES: 0
OHS CV HOLTER SINUS AVERAGE HR: 100
OHS CV HOLTER SINUS MAX HR: 133
OHS CV HOLTER SINUS MIN HR: 75

## 2022-12-13 PROCEDURE — 99350 PR HOME VISIT,ESTAB PATIENT,LEVEL IV: ICD-10-PCS | Mod: S$GLB,,, | Performed by: NURSE PRACTITIONER

## 2022-12-13 PROCEDURE — 99350 HOME/RES VST EST HIGH MDM 60: CPT | Mod: S$GLB,,, | Performed by: NURSE PRACTITIONER

## 2022-12-14 ENCOUNTER — DOCUMENT SCAN (OUTPATIENT)
Dept: HOME HEALTH SERVICES | Facility: HOSPITAL | Age: 77
End: 2022-12-14
Payer: MEDICARE

## 2022-12-14 NOTE — PROGRESS NOTES
"  [facility]  Transition of Care Home Visit    Visit Date: 12/15/2022  Encounter Provider: Katharine Urbina   PCP:  Kingston Verduzco MD    PRESENTING HISTORY      Patient ID: Katie Quevedo is a 77 y.o. female.    Consult Requested By:  No ref. provider found  Reason for Consult:  Hospital Follow Up.    Katie is being seen at home due to being seen at home due to physical debility that presents a taxing effort to leave the home, to mitigate high risk of hospital readmission and/or due to the limited availability of reliable or safe options for transportation to the point of access to the provider. Prior to treatment on this visit the chart was reviewed and patient verbal consent was obtained.      Chief Complaint: No chief complaint on file.        History of Present Illness: Ms. Katie Quevedo is a 77 y.o. female who was recently admitted to the hospital.  Admit" 12/9/22  Discharge: 12/12/22  Acute on chronic respiratory failure with hypoxia  CAP (community acquired pneumonia)  Severe stage 4 COPD with acute exacerbation due to CAP     Continuous cardiac monitoring  -improving clinically  O2 sats stable on 3 L NC CURRENTLY (this is her baseline pr patient)  duonebs  Methylprednisolone IV..SWITCH TO ORAL taper on dc  Patient on trilegy inhaler at home, will resume on discharge   -Oxygen Supplementation if necessary  -Continue HD management of heart failure      CAP (community acquired pneumonia)  -poa  -improving clinically  -afebrile  -no leucocytosis  -CXR on 12/9/22=1. Chronic appearing interstitial findings.  Increased parenchymal attenuation projects over the bilateral lower lung zones, also similar to the previous examination noting small left pleural effusion.  Correlation with any history of COPD/emphysema.  -Blood cx x 2 on 12/9/22=NEG X 48HRS  -Nebs  -IS q2 hr when awake  -zithromax daily x 5 days. Ordered on 12/10/22  -Rocephin daily x 5 days. Ordered on 12/10/22        severe stage 4 COPD  -as above  -op " f/u with PULM        Physical deconditioning  -fall precautions  -PT OT  -pt declines placement of any sort. She states she already has caregivers at home. She does not want any additional help on dc     Hypokalemia  -monitor and replace prn  -Patient with ESRD           Adjustment reaction with anxiety and depression  resume buspirone and cymbalta         ESRD (end stage renal disease) on dialysis  On HD MWF  Strict I&O  Avoid nephrotoxic agents  Renally dose meds  Consulted nephrology =follow recs  Continue allopurinol and phosphate binder   HD today prior to dc        Chronic diastolic heart failure  No acute exacerbation  Continuous cardiac monitoring   Continue HD fluid management   Most recent Echo with EF 75%  Daily Weight                    Essential hypertension  -Monitor BP=LOWER RANGE of normal w/o bp meds  -Cover with hydralazine 10mg iv prn for SBP> 160  -Adjust BP meds prn  -Held Norvasc 5 mg daily  -held metoprol tart 25mg po bid  -s/p Albumin 25gm IV once on 12/11/22        Chronic pain  Cover with pain meds prn   Caution with narcotic pain meds        ___________________________________________________________________    Today:    HPI:  Pt is being seen today for hospital follow up. See hospital course.  She is found in her home today with her  present.  She reports she was sent to ER for low blood pressure. She was treated for hypotension and pneumonia. She is oxygen dependent at her baseline and believes her breathing is returning to her baseline, but she does have a cough. B/P has been fluctuating since coming home.  Pt is checking blood pressure sometimes every hour. Pt also has problems with anxiety.  Instructed to stop checking b/p so often as her anxiety maybe contributing to the fluctuations in her readings. Instructed to check at most twice daily. She attends dialysis MWF and b/p is monitored there as well. She is concerned over medication changes at discharge. She was instructed to  stop some meds and start others, she is confused over correct meds and that she is not supposed to take her anxiety and pain meds any longer.    Reviewed discharge medication orders with pt, filled her pill box.  She has all meds in the home, antibiotics and prednisone present.  Discussed with pt that she was not taking Buspar and Cymbalta regularly before her admit.  Take these daily as prescribed to assist with her anxiety symptoms. Discussed the need to limit or eliminate opioids and benzos to avoid hypotension, decreased resp drive, sedation.  She stated understanding and will use Tylenol for pain in place of opioids. She reports she cannot get thru a dialysis session without taking her lorazepam. She has 0.5mg tabs, but her daughter reports she sometimes takes more than 1 for dialysis and also takes them on non-dialysis days. Reinforced dosing and to only use as last resort for dialysis session.           Review of Systems   Constitutional:  Negative for activity change, fatigue and fever.   HENT: Negative.     Eyes: Negative.    Respiratory:  Positive for cough and shortness of breath. Negative for chest tightness.    Cardiovascular: Negative.  Negative for leg swelling.   Gastrointestinal: Negative.    Endocrine: Negative.    Genitourinary: Negative.    Musculoskeletal:  Positive for back pain.   Skin: Negative.    Allergic/Immunologic: Negative.    Neurological: Negative.    Hematological: Negative.    Psychiatric/Behavioral:  Negative for agitation. The patient is nervous/anxious.    All other systems reviewed and are negative.    Assessments:  Environmental: single story home, lives with spouse  Functional Status: minimal assistance  Safety: fall risk  Nutritional: adequate available  Home Health/DME/Supplies: Refused HH, oxygen    PAST HISTORY:     Past Medical History:   Diagnosis Date    Acute congestive heart failure 02/10/2020    Anemia     Bilateral renal cysts     Cataract     Chronic LBP 7/26/2012     Chronic pain     CKD (chronic kidney disease), stage IV     Colon polyp 2013    COPD (chronic obstructive pulmonary disease)     Dehydration     Encounter for blood transfusion     HTN (hypertension)     Lumbar spondylosis     Melanoma     of the lip    Metabolic bone disease     Migraines, neuralgic     Osteoporosis     Primary osteoarthritis of both knees     s/p Rt TKA    Pulmonary embolism with infarction     Seizures 1972    x1 only    Subdeltoid bursitis, L>R. 9/27/2012    Ulcer     Vitamin D deficiency disease        Past Surgical History:   Procedure Laterality Date    BLADDER SUSPENSION      CATARACT EXTRACTION  11/18/13    left eye    CERVICAL LAMINECTOMY      x3, fusion x1    COLONOSCOPY  2009    DECLOTTING OF ARTERIOVENOUS GRAFT Left 1/3/2022    Procedure: [id number];  Surgeon: Lindsey Louie MD;  Location: [facility] OR;  Service: Vascular;  Laterality: Left;    DECLOTTING OF ARTERIOVENOUS GRAFT Left 2/8/2022    Procedure: [id number];  Surgeon: Lindsey Louie MD;  Location: [facility] OR;  Service: Vascular;  Laterality: Left;    DECLOTTING OF ARTERIOVENOUS GRAFT Left 4/8/2022    Procedure: [id number];  Surgeon: Lindsey Louie MD;  Location: [facility] OR;  Service: Vascular;  Laterality: Left;    FISTULOGRAM N/A 2/27/2020    Procedure: Fistulogram;  Surgeon: Noe Benitez MD;  Location: [facility] CATH LAB/EP;  Service: Cardiology;  Laterality: N/A;    HYSTERECTOMY      JOINT REPLACEMENT  2001    total right knee     LUMBAR LAMINECTOMY      x 3, fusion x1    OOPHORECTOMY      PERIPHERAL ANGIOGRAPHY N/A 3/9/2020    Procedure: Peripheral angiography;  Surgeon: Jacinto Henriquez MD;  Location: [facility] CATH LAB/EP;  Service: Cardiology;  Laterality: N/A;    PLACEMENT OF ARTERIOVENOUS GRAFT Left 1/21/2020    Procedure: INSERTION, GRAFT, ARTERIOVENOUS;  Surgeon: Lindsey Louie MD;  Location: [facility] OR;  Service: General;  Laterality: Left;    PLACEMENT OF ARTERIOVENOUS GRAFT Right  7/22/2022    Procedure: INSERTION, GRAFT, ARTERIOVENOUS;  Surgeon: Lindsey Louie MD;  Location: [facility] OR;  Service: General;  Laterality: Right;    PLACEMENT OF DUAL-LUMEN VASCULAR CATHETER Right 4/16/2022    Procedure: INSERTION-CATHETER-RICKI;  Surgeon: Lindsey Louie MD;  Location: [facility] OR;  Service: General;  Laterality: Right;    THROMBECTOMY Left 2/3/2020    Procedure: THROMBECTOMY;  Surgeon: Lindsey Louie MD;  Location: [facility] OR;  Service: General;  Laterality: Left;    THROMBECTOMY  3/9/2020    Procedure: Thrombectomy;  Surgeon: Jacinto Henriquez MD;  Location: [facility] CATH LAB/EP;  Service: Cardiology;;    THROMBECTOMY Left 4/7/2022    Procedure: THROMBECTOMY;  Surgeon: Lindsey Louie MD;  Location: [facility] OR;  Service: General;  Laterality: Left;       Family History   Problem Relation Age of Onset    Arthritis Mother     Stroke Mother     Hypertension Father     Cancer Father     Cataracts Father     Diabetes Maternal Aunt     Hypertension Maternal Grandfather     Heart disease Maternal Grandfather     Heart attack Maternal Grandfather     Cataracts Sister     Glaucoma Cousin        Social History     Socioeconomic History    Marital status:    Tobacco Use    Smoking status: Former     Types: Cigarettes    Smokeless tobacco: Former     Quit date: 2/3/2015   Substance and Sexual Activity    Alcohol use: Not Currently     Comment: Rare    Drug use: No    Sexual activity: Never     Partners: Male     Social Determinants of Health     Financial Resource Strain: Low Risk     Difficulty of Paying Living Expenses: Not hard at all   Food Insecurity: No Food Insecurity    Worried About Running Out of Food in the Last Year: Never true    Ran Out of Food in the Last Year: Never true   Transportation Needs: No Transportation Needs    Lack of Transportation (Medical): No    Lack of Transportation (Non-Medical): No   Physical Activity: Inactive    Days of Exercise per Week: 0 days    Minutes  of Exercise per Session: 0 min   Stress: No Stress Concern Present    Feeling of Stress : Only a little   Housing Stability: Low Risk     Unable to Pay for Housing in the Last Year: No    Number of Places Lived in the Last Year: 1    Unstable Housing in the Last Year: No       MEDICATIONS & ALLERGIES:     Current Outpatient Medications on File Prior to Visit   Medication Sig Dispense Refill    acetaminophen (TYLENOL) 325 MG tablet Take 1 tablet (325 mg total) by mouth every 6 (six) hours as needed for Pain. 10 tablet 0    albuterol (VENTOLIN HFA) 90 mcg/actuation inhaler Inhale 2 puffs into the lungs every 6 (six) hours as needed for Wheezing or Shortness of Breath. Rescue 18 g 3    albuterol-ipratropium (DUO-NEB) 2.5 mg-0.5 mg/3 mL nebulizer solution Take 3 mLs by nebulization every 6 (six) hours as needed for Shortness of Breath. Rescue 180 mL 11    allopurinoL (ZYLOPRIM) 100 MG tablet Take 1 tablet (100 mg total) by mouth on Tuesday, Thursday, Saturday, and Sunday. 30 tablet 0    atorvastatin (LIPITOR) 80 MG tablet Take 1 tablet (80 mg total) by mouth once daily. 30 tablet 0    azithromycin (ZITHROMAX) 500 MG tablet Take 1 tablet (500 mg total) by mouth once daily. for 4 days 4 tablet 0    busPIRone (BUSPAR) 10 MG tablet Take 1 tablet (10 mg total) by mouth 2 (two) times daily. 60 tablet 11    cefdinir (OMNICEF) 300 MG capsule Take 1 capsule (300 mg total) by mouth 2 (two) times daily. for 4 days 8 capsule 0    dextromethorphan-guaiFENesin  mg (MUCINEX DM)  mg per 12 hr tablet Take 1 tablet by mouth 2 (two) times daily. for 10 days 20 tablet 0    diclofenac sodium (VOLTAREN) 1 % Gel Apply 2 g topically 3 (three) times daily.      DULoxetine (CYMBALTA) 30 MG capsule Take 1 capsule (30 mg total) by mouth once daily. 30 capsule 3    epoetin hemant-epbx (RETACRIT) 10,000 unit/mL imjection Inject 0.5 mLs (5,000 Units total) into the skin every Mon, Wed, Fri.      fluticasone-umeclidin-vilanter (TRELEGY  ELLIPTA) 200-62.5-25 mcg inhaler Inhale 1 puff into the lungs once daily. 60 each 11    heparin, porcine, PF, (HEPARIN FLUSH 100 UNITS/ML) 100 unit/mL Syrg       megestroL (MEGACE) 40 MG Tab Take 40 mg by mouth once daily.      mupirocin (BACTROBAN) 2 % ointment Apply  Nasal route 2 (two) times daily. for 7 days 22 g 0    ondansetron (ZOFRAN-ODT) 4 MG TbDL Dissolve 1 tablet (4 mg total) by mouth every 6 (six) hours as needed (nausea). 28 tablet 0    ondansetron 4 mg/2 mL Soln       predniSONE (DELTASONE) 20 MG tablet Take 2 tablets (40 mg total) by mouth once daily for 3 days, THEN 1.5 tablets (30 mg total) once daily for 3 days, THEN 1 tablet (20 mg total) once daily for 3 days, THEN 0.5 tablets (10 mg total) once daily for 3 days. 15 tablet 0    sevelamer carbonate (RENVELA) 800 mg Tab Take 1 tablet (800 mg total) by mouth after meals as needed. 90 tablet 11    vitamin renal formula, B-complex-vitamin c-folic acid, (NEPHROCAP) 1 mg Cap Take 1 capsule by mouth once daily. 30 capsule 0     No current facility-administered medications on file prior to visit.        Review of patient's allergies indicates:   Allergen Reactions    Aspirin      Other reaction(s): hx of ulcers    Tetracycline Swelling     Other reaction(s): Swelling    Penicillins Rash     Other reaction(s): Hives  Other reaction(s): Rash  Other reaction(s): Rash  Other reaction(s): Hives       OBJECTIVE:     Vital Signs:  There were no vitals filed for this visit.  There is no height or weight on file to calculate BMI.     Physical Exam:  Physical Exam  Vitals reviewed.   Constitutional:       General: She is not in acute distress.     Appearance: She is well-developed. She is ill-appearing.   HENT:      Head: Normocephalic and atraumatic.      Nose: Nose normal.      Mouth/Throat:      Mouth: Mucous membranes are dry.   Eyes:      Pupils: Pupils are equal, round, and reactive to light.   Cardiovascular:      Rate and Rhythm: Normal rate and regular  rhythm.      Heart sounds: Normal heart sounds.   Pulmonary:      Effort: Pulmonary effort is normal.      Breath sounds: Wheezing (occasionally) present.      Comments: diminished  Abdominal:      General: Bowel sounds are normal.      Palpations: Abdomen is soft.   Musculoskeletal:         General: Normal range of motion.      Cervical back: Normal range of motion and neck supple.   Skin:     General: Skin is warm and dry.   Neurological:      Mental Status: She is alert and oriented to person, place, and time.      Cranial Nerves: No cranial nerve deficit.   Psychiatric:         Behavior: Behavior normal.         Thought Content: Thought content normal.         Judgment: Judgment normal.       Laboratory  Lab Results   Component Value Date    WBC 10.48 12/12/2022    HGB 7.8 (L) 12/12/2022    HCT 25.5 (L) 12/12/2022     (H) 12/12/2022     12/12/2022     Lab Results   Component Value Date    INR 1.1 11/10/2020    INR 1.1 07/06/2020    INR 1.2 06/11/2020     Lab Results   Component Value Date    HGBA1C 5.2 01/22/2018     No results for input(s): POCTGLUCOSE in the last 72 hours.    Diagnostic Results:      TRANSITION OF CARE:     [facility] On Call Contact Note: 12/12/22    Family and/or Caretaker present at visit?  Yes.  Diagnostic tests reviewed/disposition: No diagnosic tests pending after this hospitalization.  Disease/illness education:   Home health/community services discussion/referrals: Patient does not have home health established from hospital visit.  They do not need home health.  If needed, we will set up home health for the patient.   Establishment or re-establishment of referral orders for community resources: No other necessary community resources.   Discussion with other health care providers: No discussion with other health care providers necessary.   Pt declined home health at discharge  Transition of Care Visit:  I have reviewed and updated the history and problem list.  I have  reconciled the medication list.  I have discussed the hospitalization and current medical issues, prognosis and plans with the patient/family.  I  spent more than 50% of time discussing the care with the patient/family.  Total Face-to-Face Encounter: 60 minutes.    Medications Reconciliation:   I have reconciled the patient's home medications and discharge medications with the patient/family. I have updated all changes.  Refer to After-Visit Medication List.    ASSESSMENT & PLAN:     HIGH RISK CONDITION(S):      Problem List Items Addressed This Visit          Psychiatric    Adjustment reaction with anxiety and depression    Current Assessment & Plan     Anxiety  Restart Buspar and Cymbalta  Limit use of Ativan  Use distractions to assist with anxiety during dialysis.  Monitor            Pulmonary    CAP (community acquired pneumonia)    Current Assessment & Plan     Returning to baseline  Oxygen in use  Complete zithromax, omnief and prednisone  Monitor            Cardiac/Vascular    Aortic atherosclerosis - Primary    Overview     Noted on CXR in 12/2022         Current Assessment & Plan     Noted on imaging  Statin in place  Monitor            Renal/    ESRD (end stage renal disease) on dialysis (Chronic)    Overview                Current Assessment & Plan     On HD MWF  Avoid nephrotoxic agents  Remain compliant with chair apts  Followed by nephrology  Allopurinol and phosphate binder in place             Were controlled substances prescribed?  No    Instructions for the patient:    Scheduled Follow-up :  Future Appointments   Date Time Provider Department Center   12/19/2022 11:00 AM Shanti Avendaño MD [facility] MED CLN [facility]   1/17/2023 10:20 AM Jacinto Henriquez MD [facility] CARDIO [facility]   1/20/2023 11:00 AM Giancarlo Rust MD [facility] PULMSVC [facility]   2/6/2023  8:00 AM Katharine Urbina NP [facility] C3HV [place]   2/14/2023  1:00 PM Pushpa Mcmahon MD [facility] PHYSMED [facility]   3/23/2023  1:40  PM Meryl Sims, [facility] [place]       After Visit Medication List :     Medication List            Accurate as of December 13, 2022 11:59 PM. If you have any questions, ask your nurse or doctor.                CONTINUE taking these medications      acetaminophen 325 MG tablet  Commonly known as: TYLENOL  Take 1 tablet (325 mg total) by mouth every 6 (six) hours as needed for Pain.     albuterol 90 mcg/actuation inhaler  Commonly known as: VENTOLIN HFA  Inhale 2 puffs into the lungs every 6 (six) hours as needed for Wheezing or Shortness of Breath. Rescue     albuterol-ipratropium 2.5 mg-0.5 mg/3 mL nebulizer solution  Commonly known as: DUO-NEB  Take 3 mLs by nebulization every 6 (six) hours as needed for Shortness of Breath. Rescue     allopurinoL 100 MG tablet  Commonly known as: ZYLOPRIM  Take 1 tablet (100 mg total) by mouth on Tuesday, Thursday, Saturday, and Sunday.     atorvastatin 80 MG tablet  Commonly known as: LIPITOR  Take 1 tablet (80 mg total) by mouth once daily.     azithromycin 500 MG tablet  Commonly known as: ZITHROMAX  Take 1 tablet (500 mg total) by mouth once daily. for 4 days     busPIRone 10 MG tablet  Commonly known as: BUSPAR  Take 1 tablet (10 mg total) by mouth 2 (two) times daily.     cefdinir 300 MG capsule  Commonly known as: OMNICEF  Take 1 capsule (300 mg total) by mouth 2 (two) times daily. for 4 days     diclofenac sodium 1 % Gel  Commonly known as: VOLTAREN     DULoxetine 30 MG capsule  Commonly known as: CYMBALTA  Take 1 capsule (30 mg total) by mouth once daily.     epoetin hemant-epbx 10,000 unit/mL imjection  Commonly known as: RETACRIT  Inject 0.5 mLs (5,000 Units total) into the skin every Mon, Wed, Fri.     heparin, porcine (PF) 100 unit/mL Syrg  Commonly known as: heparin flush 100 units/mL     megestroL 40 MG Tab  Commonly known as: MEGACE     MUCINEX DM  mg per 12 hr tablet  Generic drug: dextromethorphan-guaiFENesin  mg  Take 1 tablet by mouth 2  (two) times daily. for 10 days     mupirocin 2 % ointment  Commonly known as: BACTROBAN  Apply  Nasal route 2 (two) times daily. for 7 days     ondansetron 4 MG Tbdl  Commonly known as: ZOFRAN-ODT  Dissolve 1 tablet (4 mg total) by mouth every 6 (six) hours as needed (nausea).     ondansetron 4 mg/2 mL Soln     predniSONE 20 MG tablet  Commonly known as: DELTASONE  Take 2 tablets (40 mg total) by mouth once daily for 3 days, THEN 1.5 tablets (30 mg total) once daily for 3 days, THEN 1 tablet (20 mg total) once daily for 3 days, THEN 0.5 tablets (10 mg total) once daily for 3 days.  Start taking on: December 12, 2022     sevelamer carbonate 800 mg Tab  Commonly known as: RENVELA  Take 1 tablet (800 mg total) by mouth after meals as needed.     TRELEGY ELLIPTA 200-62.5-25 mcg inhaler  Generic drug: fluticasone-umeclidin-vilanter  Inhale 1 puff into the lungs once daily.     TRIPHROCAPS 1 mg Cap  Generic drug: vitamin renal formula (B-complex-vitamin c-folic acid)  Take 1 capsule by mouth once daily.              Signature: Katharine Urbina NP

## 2022-12-15 PROBLEM — I70.0 AORTIC ATHEROSCLEROSIS: Status: ACTIVE | Noted: 2022-12-15

## 2022-12-15 LAB
BACTERIA BLD CULT: NORMAL
BACTERIA BLD CULT: NORMAL

## 2022-12-15 RX ORDER — FUROSEMIDE 80 MG/1
80 TABLET ORAL 2 TIMES DAILY
Qty: 60 TABLET | Refills: 11 | Status: SHIPPED | OUTPATIENT
Start: 2022-12-15 | End: 2022-12-17

## 2022-12-15 NOTE — ASSESSMENT & PLAN NOTE
On HD MWF  Avoid nephrotoxic agents  Remain compliant with chair apts  Followed by nephrology  Allopurinol and phosphate binder in place

## 2022-12-15 NOTE — ASSESSMENT & PLAN NOTE
Anxiety  Restart Buspar and Cymbalta  Limit use of Ativan  Use distractions to assist with anxiety during dialysis.  Monitor

## 2022-12-17 RX ORDER — FUROSEMIDE 80 MG/1
80 TABLET ORAL 2 TIMES DAILY
Qty: 60 TABLET | Refills: 11 | Status: SHIPPED | OUTPATIENT
Start: 2022-12-17 | End: 2023-12-17

## 2022-12-19 NOTE — FIRST PROVIDER EVALUATION
Emergency Department TeleTriage Encounter Note      CHIEF COMPLAINT    Chief Complaint   Patient presents with    Fever     C/O fever, loose cough, and SOB, hx of COPD and dialysis,  currently has the flu, 3L O2 NC home O2, reports home pulse ox has been reading lower than usual       VITAL SIGNS   Initial Vitals [10/11/22 1202]   BP Pulse Resp Temp SpO2   (!) 131/52 (!) 120 (!) 26 98.1 °F (36.7 °C) (!) 92 %      MAP       --            ALLERGIES    Review of patient's allergies indicates:   Allergen Reactions    Aspirin      Other reaction(s): hx of ulcers    Tetracycline Swelling     Other reaction(s): Swelling    Penicillins Rash     Other reaction(s): Hives  Other reaction(s): Rash  Other reaction(s): Rash  Other reaction(s): Hives       PROVIDER TRIAGE NOTE  TeleTriage Note: Katie Quevedo, an ill appearing, 77 y.o. female, presented to the ED with c/o worsening SOB and fever since last night. Has COPD.  recently diagnosed with the flu. Pt is extremely SOB and appear to be in respiratory distress. ED staff notified that she needed to be seen immediately.     All ED beds are full at present; patient notified of this status.  Patient seen and medically screened by Nurse Practitioner via teletriage. Orders initiated at triage to expedite care.  Patient is NOT stable to return to the waiting room.  Care will be transferred to an alternate provider when patient has been placed in an Exam Room from the [facility] for physical exam, additional orders, and disposition.  12:11 PM Jemima Anglin DNP, FNP-C        ORDERS  Labs Reviewed   CBC W/ AUTO DIFFERENTIAL   COMPREHENSIVE METABOLIC PANEL   POCT INFLUENZA A/B MOLECULAR   SARS-COV-2 RDRP GENE       ED Orders (720h ago, onward)      Start Ordered     Status Ordering Provider    10/11/22 1215 10/11/22 1214  POCT COVID-19 Rapid Screening  Once         Ordered JEMIMA ANGLIN    10/11/22 1214 10/11/22 1214  X-Ray Chest AP Portable  1 time imaging          Monitor for macular changes with visits and Mac OCT. Eat healthy and wear sunglasses. Call with any vision changes or distortion. Ordered ALAN, KAITLIN ZAPATAJae    10/11/22 1214 10/11/22 1214  Insert peripheral IV  Continuous         Ordered ALAN, KAITLIN ZAPATAJae    10/11/22 1214 10/11/22 1214  CBC auto differential  STAT         Ordered ALAN, KAITLIN ZAPATAJae    10/11/22 1214 10/11/22 1214  Comprehensive metabolic panel  STAT         Ordered ALAN, KAITLIN ZAPATAJae    10/11/22 1214 10/11/22 1214  EKG 12-lead  Once         Ordered ALAN, KAITLIN ZAPATAJae    10/11/22 1213 10/11/22 1214  POCT Influenza A/B Molecular  Once         Ordered ALAN, KAITLIN ZAPATAJae              Virtual Visit Note: The provider triage portion of this emergency department evaluation and documentation was performed via [employer], a HIPAA-compliant telemedicine application, in concert with a tele-presenter in the room. A face to face patient evaluation with one of my colleagues will occur once the patient is placed in an emergency department room.      DISCLAIMER: This note was prepared with [employer] voice recognition transcription software. Garbled syntax, mangled pronouns, and other bizarre constructions may be attributed to that software system.

## 2022-12-29 NOTE — TELEPHONE ENCOUNTER
Ok i've written to her to advise on this.[email or web address].   Physical Therapy  Name: Chiquis Hurtado MRN: [id number] :   1961   Date:  2022   Admission Date: 2022 Room:  [address] [place] [address]   Restrictions/Precautions:         General Precautions, Fall Risk, Rt BKA, with wound vac  Communication with other providers:  Dodie Tyler RN states pt is ok to see for therapy  Subjective:  Patient states:  he has to go to the bathroom, I'm so depressed because of still being here  Pain:   Location, Type, Intensity (0/10 to 10/10):  R stump  Objective:    Observation:  pt was in bed   Treatment, including education/measures:  Transfers with line management of wound vac, IV  Rolling: Ind  Supine to sit :SBA  Scooting :SBA  Sit to stand :CGA to min A to steady until with RW from bed and toilet  Stand to sit :CGA to toilet and chair  Gait:  Pt amb with RW for 10 ft x 2 with CGA  Gait Comments: with VC's' and TC's for B LE placement, walker placement and sequence throughout ambulation; with VC's and TC's to maintain upright posture in order to avoid COM shifting outside of AYANA; with VC's for PLB throughout ambulation  Sitting Exercises:  LAQ's x !0  Marching x 10   Therapeutic Exercise:  Therapeutic exercises were instructed today. Cues were given for technique, safety, recruitment, and rationale. Cues were verbal and/or tactile. Safety  Patient left safely in the chair, with call light/phone in reach with pt refusing the alarm. Gait belt was used for transfers and gait.   Assessment / Impression:     Patient's tolerance of treatment:  good, pt demeanor was down today , he would really like to go home   Adverse Reaction: none  Significant change in status and impact:  none  Barriers to improvement:  balance, endurance safety  Plan for Next Session:    Will cont to work towards pt's goals per his tolerance  Time in:  1115  Time out:  1144  Timed treatment minutes:  29  Total treatment time:  29  Previously filed items:     Short Term Goals  Time Frame for Short Term Goals: 2 weeks  Short Term Goal 1: Pt will perform sit><supine Kash  Short Term Goal 2: Pt will transfer between surfaces SBA  Short Term Goal 3: Pt will ambulate 30ft with RW SBA  Short Term Goal 4: Pt will propel WC 150ft Kash  Short Term Goal 5: Pt will perform standing light dynamic activity x 3 minutes, single UE support SBA     Electronically signed by:     Kaylyn Kahn PTA  12/29/2022, 11:44 AM

## 2023-01-03 RX ORDER — HYDROCODONE BITARTRATE AND ACETAMINOPHEN 10; 325 MG/1; MG/1
1 TABLET ORAL 3 TIMES DAILY PRN
Qty: 90 TABLET | Refills: 0 | Status: SHIPPED | OUTPATIENT
Start: 2023-01-04 | End: 2023-02-03

## 2023-01-11 DIAGNOSIS — F43.23 ADJUSTMENT REACTION WITH ANXIETY AND DEPRESSION: Primary | ICD-10-CM

## 2023-01-11 RX ORDER — LORAZEPAM 0.5 MG/1
0.5 TABLET ORAL EVERY 12 HOURS PRN
Qty: 30 TABLET | Refills: 1 | Status: SHIPPED | OUTPATIENT
Start: 2023-01-11 | End: 2023-03-28 | Stop reason: SDUPTHER

## 2023-01-11 RX ORDER — LORAZEPAM 0.5 MG/1
0.5 TABLET ORAL EVERY 12 HOURS PRN
COMMUNITY
Start: 2022-12-12 | End: 2023-01-11 | Stop reason: SDUPTHER

## 2023-01-11 NOTE — PROGRESS NOTES
Pt reports out of Ativan. She has severe anxiety related to her dialysis sessions, and reports she needs the Ativan for breakthrough anxiety to tolerate dialysis.  Refill to pharmacy

## 2023-01-16 PROBLEM — K92.0 HEMATEMESIS OF UNKNOWN CAUSE: Status: RESOLVED | Noted: 2022-10-17 | Resolved: 2023-01-16

## 2023-01-16 PROBLEM — J96.20 ACUTE ON CHRONIC RESPIRATORY FAILURE: Status: RESOLVED | Noted: 2022-10-11 | Resolved: 2023-01-16

## 2023-01-17 ENCOUNTER — TELEPHONE (OUTPATIENT)
Dept: PULMONOLOGY | Facility: CLINIC | Age: 78
End: 2023-01-17
Payer: MEDICARE

## 2023-01-17 ENCOUNTER — OFFICE VISIT (OUTPATIENT)
Dept: CARDIOLOGY | Facility: CLINIC | Age: 78
End: 2023-01-17
Payer: MEDICARE

## 2023-01-17 VITALS
HEART RATE: 94 BPM | WEIGHT: 89 LBS | BODY MASS INDEX: 16.38 KG/M2 | DIASTOLIC BLOOD PRESSURE: 66 MMHG | HEIGHT: 62 IN | OXYGEN SATURATION: 94 % | SYSTOLIC BLOOD PRESSURE: 124 MMHG

## 2023-01-17 DIAGNOSIS — J44.1 COPD EXACERBATION: ICD-10-CM

## 2023-01-17 DIAGNOSIS — I50.32 CHRONIC DIASTOLIC HEART FAILURE: Primary | ICD-10-CM

## 2023-01-17 DIAGNOSIS — J44.9 PULMONARY CACHEXIA DUE TO COPD: ICD-10-CM

## 2023-01-17 DIAGNOSIS — I51.9 DIASTOLIC DYSFUNCTION, LEFT VENTRICLE: ICD-10-CM

## 2023-01-17 DIAGNOSIS — N18.6 ESRD (END STAGE RENAL DISEASE) ON DIALYSIS: Chronic | ICD-10-CM

## 2023-01-17 DIAGNOSIS — Z99.2 ESRD (END STAGE RENAL DISEASE) ON DIALYSIS: Chronic | ICD-10-CM

## 2023-01-17 DIAGNOSIS — R53.81 PHYSICAL DECONDITIONING: ICD-10-CM

## 2023-01-17 DIAGNOSIS — J96.11 CHRONIC RESPIRATORY FAILURE WITH HYPOXIA: ICD-10-CM

## 2023-01-17 DIAGNOSIS — R64 PULMONARY CACHEXIA DUE TO COPD: ICD-10-CM

## 2023-01-17 DIAGNOSIS — I10 ESSENTIAL HYPERTENSION: Chronic | ICD-10-CM

## 2023-01-17 DIAGNOSIS — I70.0 AORTIC ATHEROSCLEROSIS: ICD-10-CM

## 2023-01-17 PROCEDURE — 1160F RVW MEDS BY RX/DR IN RCRD: CPT | Mod: HCNC,CPTII,S$GLB, | Performed by: INTERNAL MEDICINE

## 2023-01-17 PROCEDURE — 1159F PR MEDICATION LIST DOCUMENTED IN MEDICAL RECORD: ICD-10-PCS | Mod: HCNC,CPTII,S$GLB, | Performed by: INTERNAL MEDICINE

## 2023-01-17 PROCEDURE — 99214 OFFICE O/P EST MOD 30 MIN: CPT | Mod: HCNC,S$GLB,, | Performed by: INTERNAL MEDICINE

## 2023-01-17 PROCEDURE — 3288F FALL RISK ASSESSMENT DOCD: CPT | Mod: HCNC,CPTII,S$GLB, | Performed by: INTERNAL MEDICINE

## 2023-01-17 PROCEDURE — 99214 PR OFFICE/OUTPT VISIT, EST, LEVL IV, 30-39 MIN: ICD-10-PCS | Mod: HCNC,S$GLB,, | Performed by: INTERNAL MEDICINE

## 2023-01-17 PROCEDURE — 3074F SYST BP LT 130 MM HG: CPT | Mod: HCNC,CPTII,S$GLB, | Performed by: INTERNAL MEDICINE

## 2023-01-17 PROCEDURE — 1160F PR REVIEW ALL MEDS BY PRESCRIBER/CLIN PHARMACIST DOCUMENTED: ICD-10-PCS | Mod: HCNC,CPTII,S$GLB, | Performed by: INTERNAL MEDICINE

## 2023-01-17 PROCEDURE — 3078F DIAST BP <80 MM HG: CPT | Mod: HCNC,CPTII,S$GLB, | Performed by: INTERNAL MEDICINE

## 2023-01-17 PROCEDURE — 3288F PR FALLS RISK ASSESSMENT DOCUMENTED: ICD-10-PCS | Mod: HCNC,CPTII,S$GLB, | Performed by: INTERNAL MEDICINE

## 2023-01-17 PROCEDURE — 99499 RISK ADDL DX/OHS AUDIT: ICD-10-PCS | Mod: HCNC,S$GLB,, | Performed by: INTERNAL MEDICINE

## 2023-01-17 PROCEDURE — 1101F PR PT FALLS ASSESS DOC 0-1 FALLS W/OUT INJ PAST YR: ICD-10-PCS | Mod: HCNC,CPTII,S$GLB, | Performed by: INTERNAL MEDICINE

## 2023-01-17 PROCEDURE — 99999 PR PBB SHADOW E&M-EST. PATIENT-LVL IV: CPT | Mod: PBBFAC,HCNC,, | Performed by: INTERNAL MEDICINE

## 2023-01-17 PROCEDURE — 1101F PT FALLS ASSESS-DOCD LE1/YR: CPT | Mod: HCNC,CPTII,S$GLB, | Performed by: INTERNAL MEDICINE

## 2023-01-17 PROCEDURE — 99499 UNLISTED E&M SERVICE: CPT | Mod: HCNC,S$GLB,, | Performed by: INTERNAL MEDICINE

## 2023-01-17 PROCEDURE — 99999 PR PBB SHADOW E&M-EST. PATIENT-LVL IV: ICD-10-PCS | Mod: PBBFAC,HCNC,, | Performed by: INTERNAL MEDICINE

## 2023-01-17 PROCEDURE — 3078F PR MOST RECENT DIASTOLIC BLOOD PRESSURE < 80 MM HG: ICD-10-PCS | Mod: HCNC,CPTII,S$GLB, | Performed by: INTERNAL MEDICINE

## 2023-01-17 PROCEDURE — 1159F MED LIST DOCD IN RCRD: CPT | Mod: HCNC,CPTII,S$GLB, | Performed by: INTERNAL MEDICINE

## 2023-01-17 PROCEDURE — 1126F PR PAIN SEVERITY QUANTIFIED, NO PAIN PRESENT: ICD-10-PCS | Mod: HCNC,CPTII,S$GLB, | Performed by: INTERNAL MEDICINE

## 2023-01-17 PROCEDURE — 3074F PR MOST RECENT SYSTOLIC BLOOD PRESSURE < 130 MM HG: ICD-10-PCS | Mod: HCNC,CPTII,S$GLB, | Performed by: INTERNAL MEDICINE

## 2023-01-17 PROCEDURE — 1126F AMNT PAIN NOTED NONE PRSNT: CPT | Mod: HCNC,CPTII,S$GLB, | Performed by: INTERNAL MEDICINE

## 2023-01-17 NOTE — TELEPHONE ENCOUNTER
I spoke with patient in regards to cancelling appointment with Dr. Rust on January 20, 2023. Patient stated that she can't make her appointment.

## 2023-01-17 NOTE — PROGRESS NOTES
Subjective:    Patient ID:  Katie Quevedo is a 77 y.o. female who presents for evaluation of Shortness of Breath      HPI    76 y/o female who presents for f/u. She has a hx of HTn, HFpEF, COPD, ESRD on HD. Initially seen for eval for palps. Never obtained Holter and no BP log available. She has been to ED multiple times since with mostly COPD exacerbations and 1 episode of hypotension during HD. Continues to have SOB/BERTRAND and palps. Denies CP, orthopnea, PND, syncope. Compliant with meds. 2DE 10/2022 with:  The left ventricle is normal in size with concentric remodeling and hyperdynamic systolic function.  The estimated ejection fraction is 75%.  Normal left ventricular diastolic function.  The estimated PA systolic pressure is 18 mmHg.  Normal right ventricular size with normal right ventricular systolic function.  Normal central venous pressure (3 mmHg).    1/17/2023:  Had Holter with:  Predominant Rhythm Sinus rhythm with heart rates varying between 75 and 133 BPM with an average of 100BPM.  There were rare PVCs totalling 237 and averaging 4.94 per hour. There were 4 couplets. There were 2 triplets.  There were very rare PACs  There were 4 runs EAT and lasting for 3 to 5 beats. The rate varied between 160 and 190 bpm.  Was hospitalized recently for COPD exacerbation again. Main complaint again is of severe SOB and is on chronic O2. Hemodynamically stable. Tolerating HD well. Has upcoming appt with her Pulmonologist.     Review of Systems   Constitutional: Positive for malaise/fatigue.   HENT:  Negative for congestion.    Eyes:  Negative for blurred vision and double vision.   Cardiovascular:  Positive for dyspnea on exertion and palpitations. Negative for chest pain, claudication, cyanosis, irregular heartbeat, leg swelling, near-syncope, orthopnea, paroxysmal nocturnal dyspnea and syncope.   Respiratory:  Positive for shortness of breath.    Endocrine: Negative for polyuria.   Hematologic/Lymphatic: Negative for  bleeding problem.   Skin:  Negative for itching and rash.   Musculoskeletal:  Negative for joint swelling, muscle cramps and muscle weakness.   Gastrointestinal:  Negative for abdominal pain, hematemesis, hematochezia, melena, nausea and vomiting.   Genitourinary:  Negative for dysuria and hematuria.   Neurological:  Negative for dizziness, focal weakness, headaches, light-headedness, loss of balance and weakness.   Psychiatric/Behavioral:  Negative for depression. The patient is not nervous/anxious.       Objective:    Physical Exam  Constitutional:       Appearance: She is well-developed.   HENT:      Head: Normocephalic and atraumatic.   Neck:      Vascular: No JVD.   Cardiovascular:      Rate and Rhythm: Normal rate and regular rhythm.      Pulses:           Carotid pulses are 2+ on the right side and 2+ on the left side.       Radial pulses are 2+ on the right side and 2+ on the left side.        Femoral pulses are 2+ on the right side and 2+ on the left side.     Heart sounds: Normal heart sounds.   Pulmonary:      Effort: Pulmonary effort is normal.      Breath sounds: Normal breath sounds.   Abdominal:      General: Bowel sounds are normal.      Palpations: Abdomen is soft.   Musculoskeletal:      Cervical back: Neck supple.   Skin:     General: Skin is warm and dry.   Neurological:      Mental Status: She is alert and oriented to person, place, and time.   Psychiatric:         Behavior: Behavior normal.         Thought Content: Thought content normal.         Assessment:       1. Chronic diastolic heart failure    2. Essential hypertension    3. Diastolic dysfunction, left ventricle    4. Aortic atherosclerosis    5. Chronic respiratory failure with hypoxia    6. Pulmonary cachexia due to COPD    7. severe stage 4 COPD    8. ESRD (end stage renal disease) on dialysis    9. Physical deconditioning      78 y/o pt with hx and presentation as above. Clinically compensated from a HF perspective. Her  tachycardia/palps likely compensatory from chronic hypoxemia from chronic lung issues. Holter uneventful. Her symptoms mainly resulting from chronic lung issues and has upcoming appt with her Pulmonologist. Needs to stay active. Discussed the etiology, evaluation, and management of CHF, HTN, COPD, ESRD.m Discussed the importance of med compliance, heart healthy diet, and regular exercise.      Plan:       -Continue current medical management  -f/u in 8 months

## 2023-01-20 NOTE — PROGRESS NOTES
[facility] Hospital Medicine  Progress Note    Patient Name: Katie Quevedo  MRN: [id number]  Patient Class: OP- Observation   Admission Date: 11/9/2020  Length of Stay: 0 days  Attending Physician: Rosa Del Toro MD  Primary Care Provider: Kingston Verduzco MD        Subjective:     Principal Problem:Acute on chronic heart failure        HPI:  Ms. Quevedo is a 76 yo female with a pertinent history of CHF, ESRD on dialysis MWF, COPD who presented to the ED with increasing SOB x 2 days. She went to dialysis today but could not complete the treatment because she was so short of breath. She does not know how much fluid was taken off in dialysis today. She wears 2LNC at home and has been sleeping on 3 pillows to improve breathing. She denies chest pain, fevers, chills. She takes lasix daily and is compliant with her medications.     On chest Xray, she was found to have a new small right pleural effusion and a chronic moderate effusion on left side, possibly loculated. Bilateral rales on exam. No edema to BLE. Her BNP is 364. Negative troponin. She is extremely short of breath on exertion. She was started on IV lasix in the ED. She still makes urine.       Overview/Hospital Course:  Pt placed in observation for evaluation of SOB.  IR consulted for thoracentesis, consolidated LLL with a trace loculated left pleural effusion. Volume too small to warrant thoracentesis.  Pulmonology consulted, formal recs to follow but recommended CT chest without contrast.  Nephrology consulted, agree with pulm eval, VICENTA 11/11.    Interval History:  no acute events overnight. Patient went to dialysis today, 3L removed. Says she is always short of breath at home and she feels like she is back to her baseline    Review of Systems   Constitutional: Positive for activity change. Negative for chills and fever.   HENT: Negative for congestion, sore throat and trouble swallowing.    Eyes: Negative for pain and visual disturbance.    Respiratory: Positive for shortness of breath. Negative for cough.    Cardiovascular: Negative for chest pain and leg swelling.   Gastrointestinal: Negative for abdominal pain, diarrhea, nausea and vomiting.   Genitourinary: Negative for difficulty urinating and dysuria.   Musculoskeletal: Positive for arthralgias.   Skin: Negative for rash and wound.   Neurological: Positive for weakness. Negative for dizziness, syncope and headaches.   Psychiatric/Behavioral: Negative for confusion.     Objective:     Vital Signs (Most Recent):  Temp: 98 °F (36.7 °C) (11/11/20 1649)  Pulse: 78 (11/11/20 1649)  Resp: 16 (11/11/20 1649)  BP: (!) 92/58 (11/11/20 1725)  SpO2: 99 % (11/11/20 1430) Vital Signs (24h Range):  Temp:  [97.5 °F (36.4 °C)-98.3 °F (36.8 °C)] 98 °F (36.7 °C)  Pulse:  [] 78  Resp:  [16-20] 16  SpO2:  [95 %-99 %] 99 %  BP: ()/(44-98) 92/58     Weight: 49.5 kg (109 lb 2 oz)  Body mass index is 19.96 kg/m².    Intake/Output Summary (Last 24 hours) at 11/11/2020 1915  Last data filed at 11/11/2020 1315  Gross per 24 hour   Intake 400 ml   Output 2500 ml   Net -2100 ml      Physical Exam  Vitals signs and nursing note reviewed.   Constitutional:       Appearance: She is cachectic.   HENT:      Head: Normocephalic and atraumatic.      Mouth/Throat:      Mouth: Mucous membranes are moist.   Eyes:      Conjunctiva/sclera: Conjunctivae normal.   Neck:      Musculoskeletal: Neck supple.   Cardiovascular:      Rate and Rhythm: Normal rate and regular rhythm.      Pulses: Normal pulses.   Pulmonary:      Effort: Respiratory distress present.      Breath sounds: Decreased breath sounds and rales present.      Comments: Diminished lung sounds to left side  Abdominal:      General: Bowel sounds are normal.      Palpations: Abdomen is soft.   Musculoskeletal: Normal range of motion.         General: No swelling.   Skin:     General: Skin is warm and dry.      Coloration: Skin is pale.   Neurological:      Mental  Status: She is alert and oriented to person, place, and time.      Motor: Weakness present.   Psychiatric:         Mood and Affect: Mood normal.         Behavior: Behavior normal.         Thought Content: Thought content normal.         Significant Labs: All pertinent labs within the past 24 hours have been reviewed.    Significant Imaging: I have reviewed all pertinent imaging results/findings within the past 24 hours.      Assessment/Plan:      * Acute on chronic heart failure  SOB (shortness of breath)  Orthopnea    Echo done-no major change from prior   Cont supplemental O2  This presentation may be more due to end stage COPD than CHF  Stop diuresing  Plan to DC home tomorrow as she is back to her baseline        Intake/Output Summary (Last 24 hours) at 11/11/2020 1927  Last data filed at 11/11/2020 1315  Gross per 24 hour   Intake 400 ml   Output 2500 ml   Net -2100 ml         Pleural effusion  Left side is chronic  New to the right side  Consulted IR: Consolidated LLL with a trace loculated left pleural effusion. Volume too small to warrant thoracentesis.   Pulmonology consulted  CT Chest: Pleural and parenchymal opacities in the left lung base suggesting pneumonia and parapneumonic effusion.  Empyema is difficult to exclude given the thickness of the adjacent parietal pleura.     Plan to discharge home tomorrow and f/u with pulm outpatient for possible biopsy        ESRD (end stage renal disease) on dialysis    Dialysis MWF  Electrolytes WNL  Nephrology following, HD 11/11--3L removed  Still makes urine  Creatinine rising      COPD (chronic obstructive pulmonary disease)  Patient uses trelegy-elllipta at home--we do not have that here  Start duo nebs PRN   This presentation may be more likely related to COPD than CHF  She feels back to her baseline respiratory status-  Plan to discharge home tomorrow and follow up with pulm as outpatient      Chronic respiratory failure with hypoxia, on home O2 therapy   Cont  2-3L NC to maintain sats  Patient states she wears 2-4L at home          Essential hypertension  On amlodipine at home  Hold for SBP 90s        VTE Risk Mitigation (From admission, onward)         Ordered     enoxaparin injection 30 mg  Every 24 hours      11/10/20 0337     IP VTE HIGH RISK PATIENT  Once      11/09/20 1807     Place sequential compression device  Until discontinued      11/09/20 1807                Discharge Planning   LAITH:      Code Status: Full Code   Is the patient medically ready for discharge?:     Reason for patient still in hospital (select all that apply): Patient trending condition, Treatment and Consult recommendations  Discharge Plan A: Home with family                  Rachel Alexander NP  Department of Hospital Medicine   [facility]   no

## 2023-02-06 PROBLEM — J96.10 CHRONIC RESPIRATORY FAILURE: Status: RESOLVED | Noted: 2022-10-13 | Resolved: 2023-02-06

## 2023-03-07 DIAGNOSIS — J43.2 CENTRILOBULAR EMPHYSEMA: ICD-10-CM

## 2023-03-07 DIAGNOSIS — J43.2 CENTRILOBULAR EMPHYSEMA: Primary | ICD-10-CM

## 2023-03-07 RX ORDER — IPRATROPIUM BROMIDE AND ALBUTEROL SULFATE 2.5; .5 MG/3ML; MG/3ML
3 SOLUTION RESPIRATORY (INHALATION) EVERY 6 HOURS PRN
Qty: 270 ML | Refills: 11 | Status: SHIPPED | OUTPATIENT
Start: 2023-03-07 | End: 2023-06-29 | Stop reason: SDUPTHER

## 2023-03-07 RX ORDER — IPRATROPIUM BROMIDE AND ALBUTEROL SULFATE 2.5; .5 MG/3ML; MG/3ML
3 SOLUTION RESPIRATORY (INHALATION) EVERY 6 HOURS PRN
Qty: 270 ML | Refills: 11 | Status: SHIPPED | OUTPATIENT
Start: 2023-03-07 | End: 2023-03-07 | Stop reason: SDUPTHER

## 2023-03-08 RX ORDER — HYDROCODONE BITARTRATE AND ACETAMINOPHEN 10; 325 MG/1; MG/1
1 TABLET ORAL 3 TIMES DAILY PRN
Qty: 90 TABLET | Refills: 0 | Status: SHIPPED | OUTPATIENT
Start: 2023-03-10 | End: 2023-04-11 | Stop reason: SDUPTHER

## 2023-03-13 PROBLEM — J96.21 ACUTE ON CHRONIC RESPIRATORY FAILURE WITH HYPOXIA: Status: RESOLVED | Noted: 2018-03-05 | Resolved: 2023-03-13

## 2023-03-13 PROBLEM — J18.9 CAP (COMMUNITY ACQUIRED PNEUMONIA): Status: RESOLVED | Noted: 2017-09-23 | Resolved: 2023-03-13

## 2023-03-14 ENCOUNTER — TELEPHONE (OUTPATIENT)
Dept: PULMONOLOGY | Facility: CLINIC | Age: 78
End: 2023-03-14
Payer: MEDICARE

## 2023-03-14 RX ORDER — FLUTICASONE FUROATE, UMECLIDINIUM BROMIDE AND VILANTEROL TRIFENATATE 200; 62.5; 25 UG/1; UG/1; UG/1
1 POWDER RESPIRATORY (INHALATION) DAILY
Qty: 60 EACH | Refills: 11 | Status: SHIPPED | OUTPATIENT
Start: 2023-03-14 | End: 2023-06-29 | Stop reason: SDUPTHER

## 2023-03-14 NOTE — TELEPHONE ENCOUNTER
No new care gaps identified.  [facility] Embedded Care Gaps. Reference number: [id number]. 3/14/2023   11:37:28 AM BROWNT

## 2023-03-14 NOTE — TELEPHONE ENCOUNTER
Left message on patient voicemail, informing her that I have received her message. I advised patient that if she has any questions or concerns, she may contact the office. Office number has been provided.

## 2023-03-14 NOTE — TELEPHONE ENCOUNTER
----- Message from Peggy Castanon sent at 3/14/2023 10:57 AM CDT -----  Regarding: marcia landt  Contact: [phone]  Pt calling to schedule appt for COPD check up I attempted to schedule with no availability please call to discuss further

## 2023-03-28 DIAGNOSIS — F43.23 ADJUSTMENT REACTION WITH ANXIETY AND DEPRESSION: ICD-10-CM

## 2023-03-28 RX ORDER — LORAZEPAM 0.5 MG/1
0.5 TABLET ORAL DAILY PRN
Qty: 20 TABLET | Refills: 1 | Status: SHIPPED | OUTPATIENT
Start: 2023-03-28 | End: 2023-05-12 | Stop reason: SDUPTHER

## 2023-04-11 RX ORDER — HYDROCODONE BITARTRATE AND ACETAMINOPHEN 10; 325 MG/1; MG/1
1 TABLET ORAL 3 TIMES DAILY PRN
Qty: 90 TABLET | Refills: 0 | Status: SHIPPED | OUTPATIENT
Start: 2023-04-12 | End: 2023-05-11 | Stop reason: SDUPTHER

## 2023-04-11 NOTE — NURSING
Pt had coughing spell and then threw up 250 cc yellow emesis. Zofran given iv. Pt with weak congested cough.   .

## 2023-04-20 ENCOUNTER — OFFICE VISIT (OUTPATIENT)
Dept: OPTOMETRY | Facility: CLINIC | Age: 78
End: 2023-04-20
Payer: MEDICARE

## 2023-04-20 DIAGNOSIS — H52.7 REFRACTIVE ERROR: ICD-10-CM

## 2023-04-20 DIAGNOSIS — Z96.1 PSEUDOPHAKIA OF BOTH EYES: ICD-10-CM

## 2023-04-20 DIAGNOSIS — H16.223 KERATOCONJUNCTIVITIS SICCA NOT SPECIFIED AS SJOGREN'S, BILATERAL: Primary | ICD-10-CM

## 2023-04-20 PROCEDURE — 3288F PR FALLS RISK ASSESSMENT DOCUMENTED: ICD-10-PCS | Mod: HCNC,CPTII,S$GLB, | Performed by: OPTOMETRIST

## 2023-04-20 PROCEDURE — 3288F FALL RISK ASSESSMENT DOCD: CPT | Mod: HCNC,CPTII,S$GLB, | Performed by: OPTOMETRIST

## 2023-04-20 PROCEDURE — 1101F PR PT FALLS ASSESS DOC 0-1 FALLS W/OUT INJ PAST YR: ICD-10-PCS | Mod: HCNC,CPTII,S$GLB, | Performed by: OPTOMETRIST

## 2023-04-20 PROCEDURE — 92015 DETERMINE REFRACTIVE STATE: CPT | Mod: HCNC,S$GLB,, | Performed by: OPTOMETRIST

## 2023-04-20 PROCEDURE — 99999 PR PBB SHADOW E&M-EST. PATIENT-LVL III: ICD-10-PCS | Mod: PBBFAC,HCNC,, | Performed by: OPTOMETRIST

## 2023-04-20 PROCEDURE — 1126F PR PAIN SEVERITY QUANTIFIED, NO PAIN PRESENT: ICD-10-PCS | Mod: HCNC,CPTII,S$GLB, | Performed by: OPTOMETRIST

## 2023-04-20 PROCEDURE — 92004 PR EYE EXAM, NEW PATIENT,COMPREHESV: ICD-10-PCS | Mod: HCNC,S$GLB,, | Performed by: OPTOMETRIST

## 2023-04-20 PROCEDURE — 1101F PT FALLS ASSESS-DOCD LE1/YR: CPT | Mod: HCNC,CPTII,S$GLB, | Performed by: OPTOMETRIST

## 2023-04-20 PROCEDURE — 99999 PR PBB SHADOW E&M-EST. PATIENT-LVL III: CPT | Mod: PBBFAC,HCNC,, | Performed by: OPTOMETRIST

## 2023-04-20 PROCEDURE — 1159F MED LIST DOCD IN RCRD: CPT | Mod: HCNC,CPTII,S$GLB, | Performed by: OPTOMETRIST

## 2023-04-20 PROCEDURE — 92004 COMPRE OPH EXAM NEW PT 1/>: CPT | Mod: HCNC,S$GLB,, | Performed by: OPTOMETRIST

## 2023-04-20 PROCEDURE — 92015 PR REFRACTION: ICD-10-PCS | Mod: HCNC,S$GLB,, | Performed by: OPTOMETRIST

## 2023-04-20 PROCEDURE — 1159F PR MEDICATION LIST DOCUMENTED IN MEDICAL RECORD: ICD-10-PCS | Mod: HCNC,CPTII,S$GLB, | Performed by: OPTOMETRIST

## 2023-04-20 PROCEDURE — 1126F AMNT PAIN NOTED NONE PRSNT: CPT | Mod: HCNC,CPTII,S$GLB, | Performed by: OPTOMETRIST

## 2023-04-20 NOTE — PROGRESS NOTES
"HPI    CC: Pt is here today for a routine exam. She states her near vision is   very blurry. Her eyes are also very dry and feel "lianne."    KARINA: 5+ years    (+) Changes in vision   (-) Pain  (+) Irritation   (-) Itching   (-) Flashes  (+) Floaters; longstanding   (-) Glasses wearer  (-) CL wearer  (+) Uses eye gtts, OTC Tears prn OU    Does patient want a refraction today? If needed    (-) Eye injury  (+) Eye surgery , Cataract OU  (-)POHx  (-)FOHx    (-)DM         Last edited by Meryl Sims, OD on 4/20/2023  2:04 PM.            Assessment /Plan     For exam results, see Encounter Report.    Keratoconjunctivitis sicca not specified as Sjogren's, bilateral    Pseudophakia of both eyes    Refractive error      Educated pt on findings. Significant dry eyes OU. Recommended ATs QID + daniel/gel QHS for added lubrication and comfort. May need restasis/xiidra in the future. Discussed irritation and intermittent blurred vision due to dryness. If symptoms worsen or dont improve, RTC. Monitor.      2. PCIOL centered OU. Mild PCO OD. Okay to monitor yearly.      3. Updated SRx. Monitor yearly.        RTC in 1 year for annual eye exam or sooner if needed.                    "

## 2023-04-25 ENCOUNTER — OFFICE VISIT (OUTPATIENT)
Dept: FAMILY MEDICINE | Facility: CLINIC | Age: 78
End: 2023-04-25
Payer: MEDICARE

## 2023-04-25 VITALS
OXYGEN SATURATION: 95 % | HEIGHT: 62 IN | WEIGHT: 93.5 LBS | SYSTOLIC BLOOD PRESSURE: 102 MMHG | HEART RATE: 89 BPM | DIASTOLIC BLOOD PRESSURE: 56 MMHG | BODY MASS INDEX: 17.21 KG/M2 | TEMPERATURE: 98 F

## 2023-04-25 DIAGNOSIS — R64 PULMONARY CACHEXIA DUE TO COPD: ICD-10-CM

## 2023-04-25 DIAGNOSIS — N25.81 SECONDARY HYPERPARATHYROIDISM: ICD-10-CM

## 2023-04-25 DIAGNOSIS — M25.551 RIGHT HIP PAIN: ICD-10-CM

## 2023-04-25 DIAGNOSIS — J44.1 COPD EXACERBATION: ICD-10-CM

## 2023-04-25 DIAGNOSIS — F39 UNSPECIFIED MOOD (AFFECTIVE) DISORDER: ICD-10-CM

## 2023-04-25 DIAGNOSIS — J44.9 PULMONARY CACHEXIA DUE TO COPD: ICD-10-CM

## 2023-04-25 DIAGNOSIS — Z00.00 ROUTINE GENERAL MEDICAL EXAMINATION AT A HEALTH CARE FACILITY: Primary | ICD-10-CM

## 2023-04-25 DIAGNOSIS — D68.9 COAGULATION DEFECT, UNSPECIFIED: ICD-10-CM

## 2023-04-25 DIAGNOSIS — J90 LOCULATED PLEURAL EFFUSION: ICD-10-CM

## 2023-04-25 DIAGNOSIS — C43.9 MALIGNANT MELANOMA, UNSPECIFIED SITE: ICD-10-CM

## 2023-04-25 DIAGNOSIS — M81.0 OSTEOPOROSIS, UNSPECIFIED OSTEOPOROSIS TYPE, UNSPECIFIED PATHOLOGICAL FRACTURE PRESENCE: ICD-10-CM

## 2023-04-25 PROCEDURE — 3288F PR FALLS RISK ASSESSMENT DOCUMENTED: ICD-10-PCS | Mod: HCNC,CPTII,S$GLB, | Performed by: FAMILY MEDICINE

## 2023-04-25 PROCEDURE — 1101F PR PT FALLS ASSESS DOC 0-1 FALLS W/OUT INJ PAST YR: ICD-10-PCS | Mod: HCNC,CPTII,S$GLB, | Performed by: FAMILY MEDICINE

## 2023-04-25 PROCEDURE — 99397 PER PM REEVAL EST PAT 65+ YR: CPT | Mod: HCNC,S$GLB,, | Performed by: FAMILY MEDICINE

## 2023-04-25 PROCEDURE — 1125F AMNT PAIN NOTED PAIN PRSNT: CPT | Mod: HCNC,CPTII,S$GLB, | Performed by: FAMILY MEDICINE

## 2023-04-25 PROCEDURE — 1125F PR PAIN SEVERITY QUANTIFIED, PAIN PRESENT: ICD-10-PCS | Mod: HCNC,CPTII,S$GLB, | Performed by: FAMILY MEDICINE

## 2023-04-25 PROCEDURE — 99397 PR PREVENTIVE VISIT,EST,65 & OVER: ICD-10-PCS | Mod: HCNC,S$GLB,, | Performed by: FAMILY MEDICINE

## 2023-04-25 PROCEDURE — 1159F MED LIST DOCD IN RCRD: CPT | Mod: HCNC,CPTII,S$GLB, | Performed by: FAMILY MEDICINE

## 2023-04-25 PROCEDURE — 99499 UNLISTED E&M SERVICE: CPT | Mod: HCNC,S$GLB,, | Performed by: FAMILY MEDICINE

## 2023-04-25 PROCEDURE — 3078F DIAST BP <80 MM HG: CPT | Mod: HCNC,CPTII,S$GLB, | Performed by: FAMILY MEDICINE

## 2023-04-25 PROCEDURE — 99999 PR PBB SHADOW E&M-EST. PATIENT-LVL V: CPT | Mod: PBBFAC,HCNC,, | Performed by: FAMILY MEDICINE

## 2023-04-25 PROCEDURE — 1159F PR MEDICATION LIST DOCUMENTED IN MEDICAL RECORD: ICD-10-PCS | Mod: HCNC,CPTII,S$GLB, | Performed by: FAMILY MEDICINE

## 2023-04-25 PROCEDURE — 99499 RISK ADDL DX/OHS AUDIT: ICD-10-PCS | Mod: HCNC,S$GLB,, | Performed by: FAMILY MEDICINE

## 2023-04-25 PROCEDURE — 99999 PR PBB SHADOW E&M-EST. PATIENT-LVL V: ICD-10-PCS | Mod: PBBFAC,HCNC,, | Performed by: FAMILY MEDICINE

## 2023-04-25 PROCEDURE — 3078F PR MOST RECENT DIASTOLIC BLOOD PRESSURE < 80 MM HG: ICD-10-PCS | Mod: HCNC,CPTII,S$GLB, | Performed by: FAMILY MEDICINE

## 2023-04-25 PROCEDURE — 3074F SYST BP LT 130 MM HG: CPT | Mod: HCNC,CPTII,S$GLB, | Performed by: FAMILY MEDICINE

## 2023-04-25 PROCEDURE — 1101F PT FALLS ASSESS-DOCD LE1/YR: CPT | Mod: HCNC,CPTII,S$GLB, | Performed by: FAMILY MEDICINE

## 2023-04-25 PROCEDURE — 3288F FALL RISK ASSESSMENT DOCD: CPT | Mod: HCNC,CPTII,S$GLB, | Performed by: FAMILY MEDICINE

## 2023-04-25 PROCEDURE — 3074F PR MOST RECENT SYSTOLIC BLOOD PRESSURE < 130 MM HG: ICD-10-PCS | Mod: HCNC,CPTII,S$GLB, | Performed by: FAMILY MEDICINE

## 2023-04-25 NOTE — PROGRESS NOTES
(Portions of this note were dictated using voice recognition software and may contain dictation related errors in spelling/grammar/syntax not found on text review)    CC:   Chief Complaint   Patient presents with    Annual Exam       HPI: 77 y.o. female Last visit March of 2021      Medical history    COPD Followed by pulmonology (last visit 2021), on supplemental oxygen, Trelegy, Albuterol for breakthrough.  Does have CT scan abdomen pelvis on file from October 2022 for nausea and vomiting and epigastric pain.  Had some stomach and fluid-filled fluid-filled and distended stomach.  Had heterogeneous left pleural collection when compared to 2020, nonspecific, could be due to an empyema, malignant pleural effusion, malignant pleural mesothelioma or pliers pleurodesis.  Subsequent CTA of chest 10/18/2022 numerous solid pulmonary nodules and a spiculated focus of airspace consolidation right lower lobe possibly related to chronic atelectasis and/or scar similar findings on 11/2020 CT.  Bilateral pleural effusions, small right pericardial effusion, left loculated pleural effusion    Chronic Diastolic heart failure, followed by Cardiology, last visit January 2023 Last echo 2022 shows EF 75%, left ventricular concentric remodeling, normal diastolic function she is on furosemide prescribed by her nephrologist    ESRD on hemodialysis Monday Wednesday Friday     Significant anxiety relating to dialysis.  Followed by Psychiatry on lorazepam.  Was on Lexapro but currently on Cymbalta 30 mg daily prescribed by her physical medicine doctor.  Additionally on buspirone 10 mg b.i.d..    Followed by physical medicine for chronic back pains, on hydrocodone regimen 10/325 t.i.d.  \    Osteoporosis: did take prolia in 2018, has not taken since.  DEXA is due     States for past month she is been having some pain right groin region that goes down into her right knee.  No injury.    Past Medical History:   Diagnosis Date    Acute  congestive heart failure 02/10/2020    Anemia     Bilateral renal cysts     Cataract     Chronic LBP 7/26/2012    Chronic pain     CKD (chronic kidney disease), stage IV     Colon polyp 2013    COPD (chronic obstructive pulmonary disease)     Dehydration     Encounter for blood transfusion     HTN (hypertension)     Lumbar spondylosis     Melanoma     of the lip    Metabolic bone disease     Migraines, neuralgic     Osteoporosis     Primary osteoarthritis of both knees     s/p Rt TKA    Pulmonary embolism with infarction     Seizures 1972    x1 only    Subdeltoid bursitis, L>R. 9/27/2012    Ulcer     Vitamin D deficiency disease        Past Surgical History:   Procedure Laterality Date    BLADDER SUSPENSION      CATARACT EXTRACTION  11/18/13    left eye    CERVICAL LAMINECTOMY      x3, fusion x1    COLONOSCOPY  2009    DECLOTTING OF ARTERIOVENOUS GRAFT Left 1/3/2022    Procedure: [id number];  Surgeon: Lindsey Louie MD;  Location: [facility] OR;  Service: Vascular;  Laterality: Left;    DECLOTTING OF ARTERIOVENOUS GRAFT Left 2/8/2022    Procedure: [id number];  Surgeon: Lindsey Louie MD;  Location: [facility] OR;  Service: Vascular;  Laterality: Left;    DECLOTTING OF ARTERIOVENOUS GRAFT Left 4/8/2022    Procedure: [id number];  Surgeon: Lindsey Louie MD;  Location: [facility] OR;  Service: Vascular;  Laterality: Left;    FISTULOGRAM N/A 2/27/2020    Procedure: Fistulogram;  Surgeon: Noe Benitez MD;  Location: [facility] CATH LAB/EP;  Service: Cardiology;  Laterality: N/A;    HYSTERECTOMY      JOINT REPLACEMENT  2001    total right knee     LUMBAR LAMINECTOMY      x 3, fusion x1    OOPHORECTOMY      PERIPHERAL ANGIOGRAPHY N/A 3/9/2020    Procedure: Peripheral angiography;  Surgeon: Jacinto Henriquez MD;  Location: [facility] CATH LAB/EP;  Service: Cardiology;  Laterality: N/A;    PLACEMENT OF ARTERIOVENOUS GRAFT Left 1/21/2020    Procedure: INSERTION, GRAFT, ARTERIOVENOUS;  Surgeon: Lindsey MILLER  MD Liban;  Location: [facility] OR;  Service: General;  Laterality: Left;    PLACEMENT OF ARTERIOVENOUS GRAFT Right 7/22/2022    Procedure: INSERTION, GRAFT, ARTERIOVENOUS;  Surgeon: Lindsey Louie MD;  Location: [facility] OR;  Service: General;  Laterality: Right;    PLACEMENT OF DUAL-LUMEN VASCULAR CATHETER Right 4/16/2022    Procedure: INSERTION-CATHETER-RICKI;  Surgeon: Lindsey Louie MD;  Location: [facility] OR;  Service: General;  Laterality: Right;    THROMBECTOMY Left 2/3/2020    Procedure: THROMBECTOMY;  Surgeon: Lindsey Louie MD;  Location: [facility] OR;  Service: General;  Laterality: Left;    THROMBECTOMY  3/9/2020    Procedure: Thrombectomy;  Surgeon: Jacinto Henriquez MD;  Location: [facility] CATH LAB/EP;  Service: Cardiology;;    THROMBECTOMY Left 4/7/2022    Procedure: THROMBECTOMY;  Surgeon: Lindsey Louie MD;  Location: [facility] OR;  Service: General;  Laterality: Left;       Family History   Problem Relation Age of Onset    Arthritis Mother     Stroke Mother     Hypertension Father     Cancer Father     Cataracts Father     Diabetes Maternal Aunt     Hypertension Maternal Grandfather     Heart disease Maternal Grandfather     Heart attack Maternal Grandfather     Cataracts Sister     Glaucoma Cousin        Social History     Tobacco Use    Smoking status: Former     Types: Cigarettes    Smokeless tobacco: Former     Quit date: 2/3/2015   Substance Use Topics    Alcohol use: Not Currently     Comment: Rare    Drug use: No       Lab Results   Component Value Date    WBC 10.48 12/12/2022    HGB 7.8 (L) 12/12/2022    HCT 25.5 (L) 12/12/2022     (H) 12/12/2022     12/12/2022    CHOL 166 07/17/2019    TRIG 146 07/17/2019    HDL 47 07/17/2019    ALT 16 12/12/2022    AST 25 12/12/2022    BILITOT 0.2 12/12/2022    ALKPHOS 168 (H) 12/12/2022     (L) 12/12/2022    K 5.4 (H) 12/12/2022    CL 95 12/12/2022    CREATININE 3.5 (H) 12/12/2022    ESTGFRAFRICA 23 (A) 07/22/2022    EGFRNONAA 20  (A) 07/22/2022    CALCIUM 8.5 (L) 12/12/2022    ALBUMIN 3.1 (L) 12/12/2022    BUN 58 (H) 12/12/2022    CO2 19 (L) 12/12/2022    TSH 2.189 02/23/2020    INR 1.1 11/10/2020    HGBA1C 5.2 01/22/2018    MICALBCREAT 11.7 07/24/2018    LDLCALC 89.8 07/17/2019     (H) 12/12/2022    WPCQBJTN33YH 26 (L) 02/12/2020       Hemoglobin (g/dL)   Date Value   12/12/2022 7.8 (L)   12/11/2022 8.6 (L)   12/10/2022 9.4 (L)   12/09/2022 9.2 (L)   11/24/2022 9.7 (L)   11/04/2022 9.5 (L)   10/21/2022 11.9 (L)   10/19/2022 10.9 (L)   10/18/2022 12.2   10/17/2022 12.2         Wt Readings from Last 6 Encounters:   04/25/23 42.4 kg (93 lb 7.6 oz)   01/17/23 40.4 kg (89 lb)   12/12/22 42.2 kg (93 lb 0.6 oz)   12/06/22 40.8 kg (89 lb 15.2 oz)   11/04/22 40.8 kg (90 lb)   10/13/22 40.8 kg (90 lb)   :  PE:   APPEARANCE: Well nourished, well developed, in no acute distress.    HEAD: Normocephalic, atraumatic.  EYES:   Conjunctivae noninjected.  NECK: Supple with no cervical lymphadenopathy.    CHEST: Good inspiratory effort. Lungs clear to auscultation with no wheezes or crackles.  CARDIOVASCULAR: Normal S1, S2. No rubs, murmur , or gallops.  ABDOMEN: Bowel sounds normal. Not distended. Soft. No tenderness or masses. No organomegaly.  EXTREMITIES: No edema.  Pain over proximal quad/hip flexor insertion especially with active hip flexion under resistance.  Does have 4/5 hip flexion under resistance on right versus 5/5 on the left.  Normal internal/external hip rotation on the right      IMPRESSION  1. Routine general medical examination at a health care facility    2. Pulmonary cachexia due to COPD    3. severe stage 4 COPD    4. Loculated pleural effusion    5. Osteoporosis, unspecified osteoporosis type, unspecified pathological fracture presence    6. Right hip pain    7. Coagulation defect, unspecified    8. Unspecified mood (affective) disorder    9. Malignant melanoma, unspecified site    10. Secondary hyperparathyroidism               PLAN  Orders Placed This Encounter   Procedures    DXA Bone Density Axial Skeleton 1 or more sites    Ambulatory referral/consult to Pulmonology     She is overdue her pulmonology follow-up for COPD.  Also has CT findings as above that will need to be followed up as well your will place consult for repeat pulmonology appointment as she does not have 1 set up at this time    Continue nephrology follow-up for dialysis    Continue Physical Medicine follow-up for chronic back pain and medication management for this    History melanoma, no current active issues    Hip pain and hip flexor weakness noted.  Conditioning exercises prescribed in provided today.  Topical analgesics okay.  Increase physical activity as much as tolerated.  Offered x-ray regarding assessing for any bony pathology although she declines.           SCREENINGS  Colonoscopy in 2013 1 polyp (TA)  Recommend repeat in 5 years  Technically overdue colonoscopy although she is hesitant to get this done.       Immunizations:  Pneumovax.  2009  tdap    2/7/15  PCV 2016  Zoster:  To be done at pharmacy   COVID, encourage bivalent booster     Mammogram:1/29/18, had ordered repeat but she never gotten this done.     DEXA 2018.  Left femoral neck T-score -2.8.  Right femoral neck T-score -2.8.  Right radius T-score -1.0 (on prolia last tx was 9.2018), needs repeat.

## 2023-04-25 NOTE — PATIENT INSTRUCTIONS
Covid booster: updated omicron booster effective Sept 2022:  [facility] users can check availability and schedule their vaccinations via [facility]. If you don't have a [facility] account, you can sign up at [email or web address], call [phone] or visit [email or web address] for available locations and times

## 2023-05-04 ENCOUNTER — HOSPITAL ENCOUNTER (OUTPATIENT)
Dept: RADIOLOGY | Facility: HOSPITAL | Age: 78
Discharge: HOME | End: 2023-05-04
Attending: FAMILY MEDICINE
Payer: MEDICARE

## 2023-05-04 DIAGNOSIS — M81.0 OSTEOPOROSIS, UNSPECIFIED OSTEOPOROSIS TYPE, UNSPECIFIED PATHOLOGICAL FRACTURE PRESENCE: ICD-10-CM

## 2023-05-04 PROCEDURE — 77080 DXA BONE DENSITY AXIAL: CPT | Mod: 26,,, | Performed by: RADIOLOGY

## 2023-05-04 PROCEDURE — 77080 DXA BONE DENSITY AXIAL SKELETON 1 OR MORE SITES: ICD-10-PCS | Mod: 26,,, | Performed by: RADIOLOGY

## 2023-05-04 PROCEDURE — 77080 DXA BONE DENSITY AXIAL: CPT | Mod: TC

## 2023-05-10 ENCOUNTER — TELEPHONE (OUTPATIENT)
Dept: ADMINISTRATIVE | Facility: HOSPITAL | Age: 78
End: 2023-05-10
Payer: MEDICARE

## 2023-05-11 ENCOUNTER — OFFICE VISIT (OUTPATIENT)
Dept: DERMATOLOGY | Facility: CLINIC | Age: 78
End: 2023-05-11
Payer: MEDICARE

## 2023-05-11 ENCOUNTER — CARE AT HOME (OUTPATIENT)
Dept: HOME HEALTH SERVICES | Facility: CLINIC | Age: 78
End: 2023-05-11
Payer: MEDICARE

## 2023-05-11 ENCOUNTER — TELEPHONE (OUTPATIENT)
Dept: NEUROLOGY | Facility: CLINIC | Age: 78
End: 2023-05-11
Payer: MEDICARE

## 2023-05-11 DIAGNOSIS — J44.1 COPD EXACERBATION: ICD-10-CM

## 2023-05-11 DIAGNOSIS — Z12.83 SCREENING EXAM FOR SKIN CANCER: ICD-10-CM

## 2023-05-11 DIAGNOSIS — D22.9 MULTIPLE BENIGN NEVI: ICD-10-CM

## 2023-05-11 DIAGNOSIS — L57.0 AK (ACTINIC KERATOSIS): Primary | ICD-10-CM

## 2023-05-11 DIAGNOSIS — L82.1 SEBORRHEIC KERATOSES: ICD-10-CM

## 2023-05-11 DIAGNOSIS — F43.23 ADJUSTMENT REACTION WITH ANXIETY AND DEPRESSION: Primary | ICD-10-CM

## 2023-05-11 DIAGNOSIS — Z99.2 ESRD (END STAGE RENAL DISEASE) ON DIALYSIS: Chronic | ICD-10-CM

## 2023-05-11 DIAGNOSIS — N18.6 ESRD (END STAGE RENAL DISEASE) ON DIALYSIS: Chronic | ICD-10-CM

## 2023-05-11 DIAGNOSIS — L81.4 LENTIGO: ICD-10-CM

## 2023-05-11 PROCEDURE — 1126F PR PAIN SEVERITY QUANTIFIED, NO PAIN PRESENT: ICD-10-PCS | Mod: CPTII,S$GLB,, | Performed by: DERMATOLOGY

## 2023-05-11 PROCEDURE — 3288F FALL RISK ASSESSMENT DOCD: CPT | Mod: CPTII,S$GLB,, | Performed by: DERMATOLOGY

## 2023-05-11 PROCEDURE — 1160F RVW MEDS BY RX/DR IN RCRD: CPT | Mod: CPTII,S$GLB,, | Performed by: DERMATOLOGY

## 2023-05-11 PROCEDURE — 1159F MED LIST DOCD IN RCRD: CPT | Mod: CPTII,S$GLB,, | Performed by: DERMATOLOGY

## 2023-05-11 PROCEDURE — 3288F PR FALLS RISK ASSESSMENT DOCUMENTED: ICD-10-PCS | Mod: CPTII,S$GLB,, | Performed by: DERMATOLOGY

## 2023-05-11 PROCEDURE — 99349 PR HOME VISIT,ESTAB PATIENT,LEVEL III: ICD-10-PCS | Mod: S$GLB,,, | Performed by: NURSE PRACTITIONER

## 2023-05-11 PROCEDURE — 99203 OFFICE O/P NEW LOW 30 MIN: CPT | Mod: 25,S$GLB,, | Performed by: DERMATOLOGY

## 2023-05-11 PROCEDURE — 99203 PR OFFICE/OUTPT VISIT, NEW, LEVL III, 30-44 MIN: ICD-10-PCS | Mod: 25,S$GLB,, | Performed by: DERMATOLOGY

## 2023-05-11 PROCEDURE — 1101F PR PT FALLS ASSESS DOC 0-1 FALLS W/OUT INJ PAST YR: ICD-10-PCS | Mod: CPTII,S$GLB,, | Performed by: DERMATOLOGY

## 2023-05-11 PROCEDURE — 1160F PR REVIEW ALL MEDS BY PRESCRIBER/CLIN PHARMACIST DOCUMENTED: ICD-10-PCS | Mod: CPTII,S$GLB,, | Performed by: DERMATOLOGY

## 2023-05-11 PROCEDURE — 1159F PR MEDICATION LIST DOCUMENTED IN MEDICAL RECORD: ICD-10-PCS | Mod: CPTII,S$GLB,, | Performed by: DERMATOLOGY

## 2023-05-11 PROCEDURE — 1101F PT FALLS ASSESS-DOCD LE1/YR: CPT | Mod: CPTII,S$GLB,, | Performed by: DERMATOLOGY

## 2023-05-11 PROCEDURE — 17000 DESTRUCT PREMALG LESION: CPT | Mod: S$GLB,,, | Performed by: DERMATOLOGY

## 2023-05-11 PROCEDURE — 99349 HOME/RES VST EST MOD MDM 40: CPT | Mod: S$GLB,,, | Performed by: NURSE PRACTITIONER

## 2023-05-11 PROCEDURE — 1126F AMNT PAIN NOTED NONE PRSNT: CPT | Mod: CPTII,S$GLB,, | Performed by: DERMATOLOGY

## 2023-05-11 PROCEDURE — 99999 PR PBB SHADOW E&M-EST. PATIENT-LVL III: ICD-10-PCS | Mod: PBBFAC,,, | Performed by: DERMATOLOGY

## 2023-05-11 PROCEDURE — 99999 PR PBB SHADOW E&M-EST. PATIENT-LVL III: CPT | Mod: PBBFAC,,, | Performed by: DERMATOLOGY

## 2023-05-11 PROCEDURE — 17003 DESTRUCTION, PREMALIGNANT LESIONS; SECOND THROUGH 14 LESIONS: ICD-10-PCS | Mod: S$GLB,,, | Performed by: DERMATOLOGY

## 2023-05-11 PROCEDURE — 17000 PR DESTRUCTION(LASER SURGERY,CRYOSURGERY,CHEMOSURGERY),PREMALIGNANT LESIONS,FIRST LESION: ICD-10-PCS | Mod: S$GLB,,, | Performed by: DERMATOLOGY

## 2023-05-11 PROCEDURE — 17003 DESTRUCT PREMALG LES 2-14: CPT | Mod: S$GLB,,, | Performed by: DERMATOLOGY

## 2023-05-11 RX ORDER — HYDROCODONE BITARTRATE AND ACETAMINOPHEN 10; 325 MG/1; MG/1
1 TABLET ORAL 3 TIMES DAILY PRN
Qty: 90 TABLET | Refills: 0 | Status: SHIPPED | OUTPATIENT
Start: 2023-05-12 | End: 2023-06-23 | Stop reason: SDUPTHER

## 2023-05-11 NOTE — PROGRESS NOTES
Subjective:      Patient ID:  Katie Quevedo is a 77 y.o. female who presents for   Chief Complaint   Patient presents with    Lesion     Pt is 76 y/o female presenting to the clinic for ear repair.  Pt reports falling asleep while wearing her ear rings and suffered a tear.  Pt is not in any pain or has any other concerns.     Review of Systems   Constitutional:  Negative for fever and chills.   Skin:  Positive for wears hat. Negative for daily sunscreen use, activity-related sunscreen use and recent sunburn.   Hematologic/Lymphatic: Bruises/bleeds easily.     Objective:   Physical Exam   Constitutional: She appears well-developed and well-nourished. No distress.   Neurological: She is alert and oriented to person, place, and time. She is not disoriented.   Psychiatric: She has a normal mood and affect.   Skin:   Areas Examined (abnormalities noted in diagram):   Scalp / Hair Palpated and Inspected  Head / Face Inspection Performed  Neck Inspection Performed  RUE Inspected  LUE Inspection Performed               Diagram Legend     Erythematous scaling macule/papule c/w actinic keratosis       Vascular papule c/w angioma      Pigmented verrucoid papule/plaque c/w seborrheic keratosis      Yellow umbilicated papule c/w sebaceous hyperplasia      Irregularly shaped tan macule c/w lentigo     1-2 mm smooth white papules consistent with Milia      Movable subcutaneous cyst with punctum c/w epidermal inclusion cyst      Subcutaneous movable cyst c/w pilar cyst      Firm pink to brown papule c/w dermatofibroma      Pedunculated fleshy papule(s) c/w skin tag(s)      Evenly pigmented macule c/w junctional nevus     Mildly variegated pigmented, slightly irregular-bordered macule c/w mildly atypical nevus      Flesh colored to evenly pigmented papule c/w intradermal nevus       Pink pearly papule/plaque c/w basal cell carcinoma      Erythematous hyperkeratotic cursted plaque c/w SCC      Surgical scar with no sign of skin  cancer recurrence      Open and closed comedones      Inflammatory papules and pustules      Verrucoid papule consistent consistent with wart     Erythematous eczematous patches and plaques     Dystrophic onycholytic nail with subungual debris c/w onychomycosis     Umbilicated papule    Erythematous-base heme-crusted tan verrucoid plaque consistent with inflamed seborrheic keratosis     Erythematous Silvery Scaling Plaque c/w Psoriasis     See annotation      Assessment / Plan:        Screening exam for skin cancer  Patient instructed in importance in daily sun protection of at least spf 30. Sun avoidance and topical protection discussed.     Recommend  for daily use on face and neck.    Patient encouraged to wear hat for all outdoor exposure.     Also discussed sun protective clothing. TASC or columbia are good brands, UPF 50 or above. Recommend long sleeves when out in the sun.     Lentigo  This is a benign hyperpigmented sun induced lesion. Recommend daily sun protection/avoidance and use of at least SPF 30, broad spectrum sunscreen (OTC drug) will reduce the number of new lesions. Treatment of these benign lesions are considered cosmetic.    Seborrheic keratoses  These are benign inherited growths without a malignant potential. Reassurance given to patient. No treatment is necessary.     Multiple benign nevi  TBSE body skin examination performed today including at least 12 points as noted in physical examination. No lesions suspicious for malignancy noted.  Reassurance provided.  Instructed patient to observe lesion(s) for changes and follow up in clinic if changes are noted. Discussed ABCDE's of moles and brochure provided.    AK (actinic keratosis)  Cryosurgery Procedure Note    Verbal consent from the patient is obtained including, but not limited to, risk of hypopigmentation/hyperpigmentation, scar, recurrence of lesion. The patient is aware of the precancerous quality and need for treatment of these  lesions. Liquid nitrogen cryosurgery is applied to the 3 actinic keratoses, as detailed in the physical exam, to produce a freeze injury. The patient is aware that blisters may form and is instructed on wound care with gentle cleansing and use of vaseline ointment to keep moist until healed. The patient is supplied a handout on cryosurgery and is instructed to call if lesions do not completely resolve.    Laceration  - referral to plastics for repair ASAP           No follow-ups on file.

## 2023-05-11 NOTE — TELEPHONE ENCOUNTER
----- Message from Katharine Urbina NP sent at 5/11/2023  3:50 PM CDT -----  Regarding: Refill  Pt is requesting refill of her pain medications please be changed to the [facility] in [place]    Thanks,   [facility]

## 2023-05-11 NOTE — PATIENT INSTRUCTIONS

## 2023-05-12 VITALS
OXYGEN SATURATION: 97 % | RESPIRATION RATE: 16 BRPM | HEART RATE: 70 BPM | DIASTOLIC BLOOD PRESSURE: 70 MMHG | SYSTOLIC BLOOD PRESSURE: 120 MMHG

## 2023-05-12 RX ORDER — LORAZEPAM 0.5 MG/1
0.5 TABLET ORAL DAILY PRN
Qty: 30 TABLET | Refills: 1 | Status: SHIPPED | OUTPATIENT
Start: 2023-05-12 | End: 2023-07-06 | Stop reason: SDUPTHER

## 2023-05-12 NOTE — ASSESSMENT & PLAN NOTE
Anxiety related to health and dialysis  Continue Ativan with dialysis  Encouraged to use Busipar  Monitor

## 2023-05-12 NOTE — PROGRESS NOTES
[facility]  Medical Home Visit    Visit Date: 5/12/2023  Encounter Provider: Katharine Urbina  PCP:  Kingston Verduzco MD    Subjective:      Patient ID: Katie Quevedo is a 77 y.o. female.    Consult Requested By:  No ref. provider found  Reason for Consult:  COPD, Anxiety    Katie is being seen at home due to being seen at home due to physical debility that presents a taxing effort to leave the home, to mitigate high risk of hospital readmission and/or due to the limited availability of reliable or safe options for transportation to the point of access to the provider. Prior to treatment on this visit the chart was reviewed and patient verbal consent was obtained.    Chief Complaint: Anxiety and Shortness of Breath      Pt is being seen today in her home. She has a history of ESRD, COPD with oxygen dependence, anxiety and depression, HLD. She attends dialysis on MWF.  She reports she went to [facility] this am     Ms Quevedo reports she continues to have SOB at times, she can occasionally take breaks from her oxygen but does use when is activity. It does help when she is SOB.  She is complaint with all medications.  She has extreme severe anxiety before and during dialysis treatments. She previously had her  go with and sit with her to try to remain calm but as he his health has declined he has not been able to go for a significant time and she has been using Ativan before sessions, which is helpful. She reports she occasionally takes one before bedtime to help her sleep as she has had life long anxiety. Reviewed fall risk with Ativan and pt understands to use medication as prescribed and fall precautions         Review of Systems   Constitutional:  Negative for activity change, fatigue and fever.   HENT: Negative.     Eyes: Negative.    Respiratory:  Positive for shortness of breath. Negative for chest tightness.    Cardiovascular: Negative.  Negative for leg swelling.   Gastrointestinal: Negative.    Endocrine:  Negative.    Genitourinary: Negative.    Musculoskeletal: Negative.    Skin: Negative.    Allergic/Immunologic: Negative.    Neurological: Negative.    Hematological: Negative.    Psychiatric/Behavioral:  Negative for agitation. The patient is nervous/anxious.    All other systems reviewed and are negative.    Assessments:  Environmental: single story home, lives with spouse, open paths, adequate light and temp  Functional Status: independent  Safety: fall risk  Nutritional: adequate available  Home Health/DME/Supplies: none/oxygen    Objective:   Physical Exam  Vitals reviewed.   Constitutional:       General: She is not in acute distress.     Appearance: She is well-developed. She is ill-appearing (thin).   HENT:      Head: Normocephalic and atraumatic.      Nose: Nose normal.      Mouth/Throat:      Mouth: Mucous membranes are dry.   Eyes:      Pupils: Pupils are equal, round, and reactive to light.   Cardiovascular:      Rate and Rhythm: Normal rate and regular rhythm.      Heart sounds: Normal heart sounds.   Pulmonary:      Effort: Pulmonary effort is normal.      Comments: Diminished to all fields  Oxygen in use at 2L  Abdominal:      General: Bowel sounds are normal.      Palpations: Abdomen is soft.   Musculoskeletal:         General: Normal range of motion.      Cervical back: Normal range of motion and neck supple.   Skin:     General: Skin is warm and dry.      Findings: Bruising present.   Neurological:      Mental Status: She is alert and oriented to person, place, and time.      Cranial Nerves: No cranial nerve deficit.   Psychiatric:         Behavior: Behavior normal.         Thought Content: Thought content normal.         Judgment: Judgment normal.       Vitals:    05/11/23 1403   BP: 120/70   Pulse: 70   Resp: 16   SpO2: 97%     There is no height or weight on file to calculate BMI.    Assessment:     1. Adjustment reaction with anxiety and depression    2. severe stage 4 COPD    3. ESRD (end  stage renal disease) on dialysis        Plan:            Problem List Items Addressed This Visit          Psychiatric    Adjustment reaction with anxiety and depression - Primary    Current Assessment & Plan     Anxiety related to health and dialysis  Continue Ativan with dialysis  Encouraged to use Busipar  Monitor           Relevant Medications    LORazepam (ATIVAN) 0.5 MG tablet       Pulmonary    severe stage 4 COPD    Current Assessment & Plan     Chronic lung disease  Oxygen in use at 2 L  Avoid aggravating factors  Monitor              Renal/    ESRD (end stage renal disease) on dialysis (Chronic)    Overview                  Current Assessment & Plan     HD on MWF  Followed by nephrology               Were controlled substances prescribed?  Yes, and the [place] [facility] Controlled Substance Database was reviewed.  Total visit time: 45 min  Follow Up Appointments:   Future Appointments   Date Time Provider Department Center   5/31/2023  4:00 PM Giancarlo Rust MD [facility]       Signature: Katharine Urbina NP

## 2023-05-23 ENCOUNTER — HOSPITAL ENCOUNTER (EMERGENCY)
Facility: HOSPITAL | Age: 78
Discharge: HOME | End: 2023-05-23
Attending: EMERGENCY MEDICINE
Payer: MEDICARE

## 2023-05-23 VITALS
TEMPERATURE: 98 F | OXYGEN SATURATION: 96 % | BODY MASS INDEX: 17.19 KG/M2 | WEIGHT: 94 LBS | RESPIRATION RATE: 22 BRPM | SYSTOLIC BLOOD PRESSURE: 88 MMHG | DIASTOLIC BLOOD PRESSURE: 52 MMHG | HEART RATE: 93 BPM

## 2023-05-23 DIAGNOSIS — R07.9 CHEST PAIN: ICD-10-CM

## 2023-05-23 DIAGNOSIS — R05.9 COUGH, UNSPECIFIED TYPE: Primary | ICD-10-CM

## 2023-05-23 LAB
ALBUMIN SERPL BCP-MCNC: 2.9 G/DL (ref 3.5–5.2)
ALP SERPL-CCNC: 294 U/L (ref 55–135)
ALT SERPL W/O P-5'-P-CCNC: 13 U/L (ref 10–44)
ANION GAP SERPL CALC-SCNC: 13 MMOL/L (ref 8–16)
AST SERPL-CCNC: 19 U/L (ref 10–40)
BASOPHILS # BLD AUTO: 0.08 K/UL (ref 0–0.2)
BASOPHILS NFR BLD: 0.8 % (ref 0–1.9)
BILIRUB SERPL-MCNC: 0.4 MG/DL (ref 0.1–1)
BNP SERPL-MCNC: 252 PG/ML (ref 0–99)
BUN SERPL-MCNC: 17 MG/DL (ref 8–23)
CALCIUM SERPL-MCNC: 9.6 MG/DL (ref 8.7–10.5)
CHLORIDE SERPL-SCNC: 98 MMOL/L (ref 95–110)
CO2 SERPL-SCNC: 24 MMOL/L (ref 23–29)
CREAT SERPL-MCNC: 4.2 MG/DL (ref 0.5–1.4)
CTP QC/QA: YES
DIFFERENTIAL METHOD: ABNORMAL
EOSINOPHIL # BLD AUTO: 0.5 K/UL (ref 0–0.5)
EOSINOPHIL NFR BLD: 4.9 % (ref 0–8)
ERYTHROCYTE [DISTWIDTH] IN BLOOD BY AUTOMATED COUNT: 14.1 % (ref 11.5–14.5)
EST. GFR  (NO RACE VARIABLE): 10 ML/MIN/1.73 M^2
GLUCOSE SERPL-MCNC: 96 MG/DL (ref 70–110)
HCT VFR BLD AUTO: 35.3 % (ref 37–48.5)
HGB BLD-MCNC: 10.8 G/DL (ref 12–16)
IMM GRANULOCYTES # BLD AUTO: 0.24 K/UL (ref 0–0.04)
IMM GRANULOCYTES NFR BLD AUTO: 2.3 % (ref 0–0.5)
LYMPHOCYTES # BLD AUTO: 1.1 K/UL (ref 1–4.8)
LYMPHOCYTES NFR BLD: 10.1 % (ref 18–48)
MCH RBC QN AUTO: 31.3 PG (ref 27–31)
MCHC RBC AUTO-ENTMCNC: 30.6 G/DL (ref 32–36)
MCV RBC AUTO: 102 FL (ref 82–98)
MONOCYTES # BLD AUTO: 1 K/UL (ref 0.3–1)
MONOCYTES NFR BLD: 9.2 % (ref 4–15)
NEUTROPHILS # BLD AUTO: 7.6 K/UL (ref 1.8–7.7)
NEUTROPHILS NFR BLD: 72.7 % (ref 38–73)
NRBC BLD-RTO: 0 /100 WBC
PLATELET # BLD AUTO: 296 K/UL (ref 150–450)
PMV BLD AUTO: 9.2 FL (ref 9.2–12.9)
POTASSIUM SERPL-SCNC: 3.8 MMOL/L (ref 3.5–5.1)
PROT SERPL-MCNC: 7.8 G/DL (ref 6–8.4)
RBC # BLD AUTO: 3.45 M/UL (ref 4–5.4)
SARS-COV-2 RDRP RESP QL NAA+PROBE: NEGATIVE
SODIUM SERPL-SCNC: 135 MMOL/L (ref 136–145)
TROPONIN I SERPL DL<=0.01 NG/ML-MCNC: <0.006 NG/ML (ref 0–0.03)
WBC # BLD AUTO: 10.45 K/UL (ref 3.9–12.7)

## 2023-05-23 PROCEDURE — 99285 EMERGENCY DEPT VISIT HI MDM: CPT | Mod: 25

## 2023-05-23 PROCEDURE — 85025 COMPLETE CBC W/AUTO DIFF WBC: CPT | Performed by: EMERGENCY MEDICINE

## 2023-05-23 PROCEDURE — 84484 ASSAY OF TROPONIN QUANT: CPT | Performed by: EMERGENCY MEDICINE

## 2023-05-23 PROCEDURE — 93005 ELECTROCARDIOGRAM TRACING: CPT

## 2023-05-23 PROCEDURE — 83880 ASSAY OF NATRIURETIC PEPTIDE: CPT | Performed by: EMERGENCY MEDICINE

## 2023-05-23 PROCEDURE — 93010 ELECTROCARDIOGRAM REPORT: CPT | Mod: ,,, | Performed by: INTERNAL MEDICINE

## 2023-05-23 PROCEDURE — 80053 COMPREHEN METABOLIC PANEL: CPT | Performed by: EMERGENCY MEDICINE

## 2023-05-23 PROCEDURE — 93010 EKG 12-LEAD: ICD-10-PCS | Mod: ,,, | Performed by: INTERNAL MEDICINE

## 2023-05-23 RX ORDER — AZITHROMYCIN 250 MG/1
250 TABLET, FILM COATED ORAL DAILY
Qty: 6 TABLET | Refills: 0 | Status: SHIPPED | OUTPATIENT
Start: 2023-05-23 | End: 2023-06-06

## 2023-05-23 RX ORDER — BENZONATATE 100 MG/1
100 CAPSULE ORAL 3 TIMES DAILY PRN
Qty: 20 CAPSULE | Refills: 0 | Status: SHIPPED | OUTPATIENT
Start: 2023-05-23 | End: 2023-06-06

## 2023-05-23 NOTE — ED PROVIDER NOTES
"Encounter Date: 5/23/2023    SCRIBE #1 NOTE: I, Eddie Burr, am scribing for, and in the presence of,  Myron Sheehan MD. I have scribed the following portions of the note - Other sections scribed: HPI, ROS, Physical Exam, MDM.     History     Chief Complaint   Patient presents with    Chest Pain     Chest pain x3 days. Describes as burning/mid sternal. Pt. Is on 3l/nc o2 with shortness of breath above baseline. Productive, yellow cough.      Patient a 77 year old female who has a past medical history of acute congestive heart failure, anemia, chronic kidney disease, chronic obstructive pulmonary disease, hypertension, and pulmonary embolism with infarction presents to the ED due to chest pain. Patient reports the chest pain started three days ago (3/20) and describes it as "pressure". Patient describes having mid sternal reproducible chest pain worsening after coughing. Patient also reports having a productive cough and describes the phlegm as yellow, increased shortness of breath, subjective fever. She denies diarrhea or vomiting. Symptoms started approximately one week ago. No abdominal pain reported. Patient is currently on 3 liters of nasal canula at home. Patient is currently on dialysis with last treatment being yesterday 5/22.                 The history is provided by the patient.   Review of patient's allergies indicates:   Allergen Reactions    Aspirin      Other reaction(s): hx of ulcers    Tetracycline Swelling     Other reaction(s): Swelling    Penicillins Rash     Other reaction(s): Hives  Other reaction(s): Rash  Other reaction(s): Rash  Other reaction(s): Hives     Past Medical History:   Diagnosis Date    Acute congestive heart failure 02/10/2020    Anemia     Bilateral renal cysts     Cataract     Chronic LBP 7/26/2012    Chronic pain     CKD (chronic kidney disease), stage IV     Colon polyp 2013    COPD (chronic obstructive pulmonary disease)     Dehydration     Encounter for blood " transfusion     HTN (hypertension)     Lumbar spondylosis     Melanoma     of the lip    Metabolic bone disease     Migraines, neuralgic     Osteoporosis     Primary osteoarthritis of both knees     s/p Rt TKA    Pulmonary embolism with infarction     Seizures 1972    x1 only    Subdeltoid bursitis, L>R. 9/27/2012    Ulcer     Vitamin D deficiency disease      Past Surgical History:   Procedure Laterality Date    BLADDER SUSPENSION      CATARACT EXTRACTION  11/18/13    left eye    CERVICAL LAMINECTOMY      x3, fusion x1    COLONOSCOPY  2009    DECLOTTING OF ARTERIOVENOUS GRAFT Left 1/3/2022    Procedure: [id number];  Surgeon: Lindsey Louie MD;  Location: [facility] OR;  Service: Vascular;  Laterality: Left;    DECLOTTING OF ARTERIOVENOUS GRAFT Left 2/8/2022    Procedure: [id number];  Surgeon: Lindsey Louie MD;  Location: [facility] OR;  Service: Vascular;  Laterality: Left;    DECLOTTING OF ARTERIOVENOUS GRAFT Left 4/8/2022    Procedure: [id number];  Surgeon: Lindsey Louie MD;  Location: [facility] OR;  Service: Vascular;  Laterality: Left;    FISTULOGRAM N/A 2/27/2020    Procedure: Fistulogram;  Surgeon: Noe Benitez MD;  Location: [facility] CATH LAB/EP;  Service: Cardiology;  Laterality: N/A;    HYSTERECTOMY      JOINT REPLACEMENT  2001    total right knee     LUMBAR LAMINECTOMY      x 3, fusion x1    OOPHORECTOMY      PERIPHERAL ANGIOGRAPHY N/A 3/9/2020    Procedure: Peripheral angiography;  Surgeon: Jacinto Henriquez MD;  Location: [facility] CATH LAB/EP;  Service: Cardiology;  Laterality: N/A;    PLACEMENT OF ARTERIOVENOUS GRAFT Left 1/21/2020    Procedure: INSERTION, GRAFT, ARTERIOVENOUS;  Surgeon: Lindsey Louie MD;  Location: [facility] OR;  Service: General;  Laterality: Left;    PLACEMENT OF ARTERIOVENOUS GRAFT Right 7/22/2022    Procedure: INSERTION, GRAFT, ARTERIOVENOUS;  Surgeon: Lindsey Louie MD;  Location: [facility] OR;  Service: General;  Laterality: Right;    PLACEMENT OF  DUAL-LUMEN VASCULAR CATHETER Right 4/16/2022    Procedure: INSERTION-CATHETER-RICKI;  Surgeon: Lindsey Louie MD;  Location: [facility] OR;  Service: General;  Laterality: Right;    THROMBECTOMY Left 2/3/2020    Procedure: THROMBECTOMY;  Surgeon: Lindsey Louie MD;  Location: [facility] OR;  Service: General;  Laterality: Left;    THROMBECTOMY  3/9/2020    Procedure: Thrombectomy;  Surgeon: Jacinto Henriquez MD;  Location: [facility] CATH LAB/EP;  Service: Cardiology;;    THROMBECTOMY Left 4/7/2022    Procedure: THROMBECTOMY;  Surgeon: Lindsey Louie MD;  Location: [facility] OR;  Service: General;  Laterality: Left;     Family History   Problem Relation Age of Onset    Arthritis Mother     Stroke Mother     Hypertension Father     Cancer Father     Cataracts Father     Diabetes Maternal Aunt     Hypertension Maternal Grandfather     Heart disease Maternal Grandfather     Heart attack Maternal Grandfather     Cataracts Sister     Glaucoma Cousin      Social History     Tobacco Use    Smoking status: Former     Types: Cigarettes    Smokeless tobacco: Former     Quit date: 2/3/2015   Substance Use Topics    Alcohol use: Not Currently     Comment: Rare    Drug use: No     Review of Systems   Constitutional:  Positive for chills and fever (subjective).   HENT:  Negative for congestion.    Eyes:  Negative for visual disturbance.   Respiratory:  Positive for cough and shortness of breath.    Cardiovascular:  Positive for chest pain.   Gastrointestinal:  Negative for abdominal pain, diarrhea, nausea (*) and vomiting.   Genitourinary:  Negative for flank pain.   Musculoskeletal:  Negative for myalgias.   Skin:  Negative for rash.   Allergic/Immunologic: Negative for immunocompromised state.   Neurological:  Negative for weakness.   Hematological:  Does not bruise/bleed easily.   Psychiatric/Behavioral:  Negative for confusion.      Physical Exam     Initial Vitals [05/23/23 1254]   BP Pulse Resp Temp SpO2   (!) 99/53 102 (!) 22  98.1 °F (36.7 °C) (!) 91 %      MAP       --         Physical Exam    Nursing note and vitals reviewed.  Constitutional: She appears well-developed and well-nourished. No distress.   Patient well appearing   Non toxic   No acute distress noted    Eyes: EOM are normal. Pupils are equal, round, and reactive to light.   Neck: Neck supple.   Normal range of motion.  Pulmonary/Chest: Breath sounds normal. No respiratory distress. She has no wheezes.   Dialysis access to R anterior chest wall   Abdominal: Abdomen is soft. Bowel sounds are normal.   Musculoskeletal:         General: No tenderness or edema. Normal range of motion.      Cervical back: Normal range of motion and neck supple.     Neurological: She is alert and oriented to person, place, and time. She has normal strength.   Skin: Skin is warm and dry. Capillary refill takes less than 2 seconds.   Psychiatric: She has a normal mood and affect.       ED Course   Procedures  Labs Reviewed   CBC W/ AUTO DIFFERENTIAL - Abnormal; Notable for the following components:       Result Value    RBC 3.45 (*)     Hemoglobin 10.8 (*)     Hematocrit 35.3 (*)      (*)     MCH 31.3 (*)     MCHC 30.6 (*)     Immature Granulocytes 2.3 (*)     Immature Grans (Abs) 0.24 (*)     Lymph % 10.1 (*)     All other components within normal limits   COMPREHENSIVE METABOLIC PANEL - Abnormal; Notable for the following components:    Sodium 135 (*)     Creatinine 4.2 (*)     Albumin 2.9 (*)     Alkaline Phosphatase 294 (*)     eGFR 10 (*)     All other components within normal limits   B-TYPE NATRIURETIC PEPTIDE - Abnormal; Notable for the following components:     (*)     All other components within normal limits   TROPONIN I   SARS-COV-2 RDRP GENE     EKG Readings: (Independently Interpreted)   Rhythm: Sinus Tachycardia. Heart Rate: 107.   Sinus tachycardia; no ischemic change; no STEMI   ECG Results              EKG 12-lead (Final result)  Result time 05/24/23 08:05:56       Final result by Interface, Lab In [facility] (05/24/23 08:05:56)                   Narrative:    Test Reason : R07.9,    Vent. Rate : 107 BPM     Atrial Rate : 107 BPM     P-R Int : 158 ms          QRS Dur : 066 ms      QT Int : 324 ms       P-R-T Axes : 085 111 061 degrees     QTc Int : 432 ms    Sinus tachycardia  Low voltage QRS  right axis deviation   poor precordial R wave progression   Abnormal ECG  When compared with ECG of 09-DEC-2022 16:39,  No significant change was found  Confirmed by Pauly Rock MD ([id number]) on 5/24/2023 8:05:48 AM    Referred By: [email or web address]   SELF           Confirmed By:Pauly Rock MD                                  Imaging Results              X-Ray Chest AP Portable (Final result)  Result time 05/23/23 13:51:18      Final result by Joe London Jr., MD (05/23/23 13:51:18)                   Impression:      Double-lumen central line in good good position.  Chronic small left pleural effusion with left base atelectasis and band of atelectasis noted at the right lung base.      Electronically signed by: Joe London MD  Date:    05/23/2023  Time:    13:51               Narrative:    EXAMINATION:  XR CHEST AP PORTABLE    CLINICAL HISTORY:  Chest pain, unspecified    TECHNIQUE:  Single frontal view of the chest was performed.    COMPARISON:  Chest x-ray of December 9, 2022.    FINDINGS:  A double lumen central line enters at the right neck and ends in the superior vena cava.  The cardiac size and contours within normal limits.  There is unchanged small left pleural effusion with atelectasis in the left lung base.  A band of atelectasis is noted in the right lung base is well.  The upper lung fields are clear.  No pneumothorax is seen.                                       Medications - No data to display  Medical Decision Making:   Initial Assessment:   Patient a 77 year old female who has a past medical history of acute congestive heart failure,anemia,chronic kidney  "disease,chronic obstructive pulmonary disease,hypertension, and pulmonary embolism with infarction presents to the ED due to chest pain. Patient reports chest pain started three days ago (3/20) and describes it as "pressure". Patient describes mid sternal reproducible chest pain worsening after coughing. Patient also  reports having a productive cough and describes the phlegm as yellow, increased shortness of breath, subjective fever at home, chills, and one episode of emesis on 5/21. Reported symptoms started one week ago. No abdominal pain reported. Patient is currently on 3 liters of nasal canula at home. Patient is currently on dialysis with last treatment being yesterday 5/22.     Differential Diagnosis:   Differential Diagnosis includes, but is not limited to:  ACS/MI, PE, aortic dissection, pneumothorax, cardiac tamponade, pericarditis/myocarditis, pneumonia, infection/abscess, lung mass, trauma/fracture, costochondritis/pleurisy, MSK pain/contusion, GERD, biliary disease, pancreatitis, anemia    Clinical Tests:   Lab Tests: Ordered and Reviewed  Radiological Study: Ordered and Reviewed  Medical Tests: Ordered and Reviewed  ED Management:  - ED workup, including, troponin, is unremarkable  - CXR demonstrates no acute cardiopulmonary process per my independent interpretation  - patient hypotensive at baseline-she denies any dizziness lightheadedness or other cardiopulmonary or neurological type complaints  - in light patient's COPD history, productive cough, will discharge home with prescription for antibiotic  - patient stable for discharge at this time  - No further intervention is indicated at this time after having taken into account the patient's history, physical exam findings, and empirical and objective data obtained during the patient's emergency department workup.   - The patient is at low risk for an emergent medical condition at this time, and I am of the belief that that it is safe to discharge " the patient from the emergency department.   - The patient is instructed to follow up as outpatient as indicated on the discharge paperwork.    - I have discussed the specifics of the workup with the patient and the patient has verbalized understanding of the details of the workup, the diagnosis, the treatment plan, and the need for outpatient follow-up.    - Although the patient has no emergent etiology today this does not preclude the development of an emergent condition so, in addition, I have advised the patient that they can return to the ED and/or activate EMS at any time with worsening of their symptoms, change of their symptoms, or with any other medical complaint.    - The patient remained comfortable and stable during their visit in the ED.    - Discharge and follow-up instructions discussed with the patient who expressed understanding and willingness to comply with my recommendations.  - Results of all emergency department tests  discussed thoroughly with patient; all patient questions answered; pt in agreement with plan  - Pt instructed to follow up with PCP in 2-3 days for recheck of today's complaints  - Pt given strict emergency department return precautions for any new or worsening of symptoms  - Pt discharged from the emergency department in stable condition, in no acute distress               ED Course as of 05/24/23 1242   Tue May 23, 2023   1401 Troponin I: <0.006  Reviewed by self - WNL.  [LC]   1401 BNP(!): 252  Reviewed by self- appears to be pt's baseline.  [LC]   1524 Patient's blood pressure noted to be 99/53. Pt states that her blood pressure is normally in this range. She denies any lightheadedness or other complaints at this time.  [LC]      ED Course User Index  [LC] Myron Sheehan MD                   Clinical Impression:   Final diagnoses:  [R07.9] Chest pain  [R05.9] Cough, unspecified type (Primary)        ED Disposition Condition    Discharge Stable          ED Prescriptions        Medication Sig Dispense Start Date End Date Auth. Provider    azithromycin (Z-MERON) 250 MG tablet Take 1 tablet (250 mg total) by mouth once daily. Take first 2 tablets together, then 1 every day until finished. 6 tablet 5/23/2023 -- Myron Sheehan MD    benzonatate (TESSALON) 100 MG capsule Take 1 capsule (100 mg total) by mouth 3 (three) times daily as needed for Cough. 20 capsule 5/23/2023 -- Myron Sheehan MD          Follow-up Information       Follow up With Specialties Details Why Contact Info    [place] Manjit Verduzco MD Family Medicine Schedule an appointment as soon as possible for a visit   [address]  [address]  [facility] [address] [phone]               Myron Sheehan MD  05/24/23 0201

## 2023-05-23 NOTE — ED NOTES
Pt ambulatory to ED for SOB, chest pain and productive cough x1 week. Pt states reproducable midsternal chest pain, worsening after cough.  . Pt endorses subjective fever/chills at home, skin warm/dry, afebrile.  Pt RR 22, even/labored, SPO2 96% on 3 L per NC.  Pt wears 2 L per NC at home.  A/Ox4.  Bed low/locked, siderails upx1, pt agrees to plan of care.

## 2023-05-30 ENCOUNTER — TELEPHONE (OUTPATIENT)
Dept: PULMONOLOGY | Facility: CLINIC | Age: 78
End: 2023-05-30
Payer: MEDICARE

## 2023-05-30 NOTE — TELEPHONE ENCOUNTER
Call returned to patient, patient advised her appointment has been rescheduled. Patient verbalized understanding.

## 2023-05-30 NOTE — TELEPHONE ENCOUNTER
----- Message from [facility] sent at 5/30/2023 11:07 AM CDT -----  Type:  Needs Medical Advice    Who Called: Pt   Would the patient rather a call back or a response via [facility]? callback  Best Call Back Number: [phone]  Additional Information: Pt requesting an urgent callback. Pt needs to reschedule appt that was just cancelled on 05/31/2023. Pt states she's been waiting 4 months for appt and has repeatedly told office not to schedule her on her dialysis days (MWF). Pt requesting to scheduled on a Tuesday or a Thursday.

## 2023-06-06 ENCOUNTER — OFFICE VISIT (OUTPATIENT)
Dept: FAMILY MEDICINE | Facility: CLINIC | Age: 78
End: 2023-06-06
Payer: MEDICARE

## 2023-06-06 VITALS
OXYGEN SATURATION: 92 % | DIASTOLIC BLOOD PRESSURE: 62 MMHG | TEMPERATURE: 98 F | HEIGHT: 62 IN | SYSTOLIC BLOOD PRESSURE: 90 MMHG | BODY MASS INDEX: 16.71 KG/M2 | WEIGHT: 90.81 LBS | HEART RATE: 104 BPM

## 2023-06-06 DIAGNOSIS — R63.0 DECREASED APPETITE: ICD-10-CM

## 2023-06-06 DIAGNOSIS — J44.9 PULMONARY CACHEXIA DUE TO COPD: ICD-10-CM

## 2023-06-06 DIAGNOSIS — N18.6 ESRD (END STAGE RENAL DISEASE) ON DIALYSIS: ICD-10-CM

## 2023-06-06 DIAGNOSIS — J44.1 COPD EXACERBATION: Primary | ICD-10-CM

## 2023-06-06 DIAGNOSIS — Z99.2 ESRD (END STAGE RENAL DISEASE) ON DIALYSIS: ICD-10-CM

## 2023-06-06 DIAGNOSIS — R91.8 MULTIPLE PULMONARY NODULES: ICD-10-CM

## 2023-06-06 DIAGNOSIS — R64 PULMONARY CACHEXIA DUE TO COPD: ICD-10-CM

## 2023-06-06 DIAGNOSIS — M81.0 OSTEOPOROSIS, UNSPECIFIED OSTEOPOROSIS TYPE, UNSPECIFIED PATHOLOGICAL FRACTURE PRESENCE: ICD-10-CM

## 2023-06-06 PROCEDURE — 1126F PR PAIN SEVERITY QUANTIFIED, NO PAIN PRESENT: ICD-10-PCS | Mod: CPTII,S$GLB,, | Performed by: FAMILY MEDICINE

## 2023-06-06 PROCEDURE — 1160F PR REVIEW ALL MEDS BY PRESCRIBER/CLIN PHARMACIST DOCUMENTED: ICD-10-PCS | Mod: CPTII,S$GLB,, | Performed by: FAMILY MEDICINE

## 2023-06-06 PROCEDURE — 3074F PR MOST RECENT SYSTOLIC BLOOD PRESSURE < 130 MM HG: ICD-10-PCS | Mod: CPTII,S$GLB,, | Performed by: FAMILY MEDICINE

## 2023-06-06 PROCEDURE — 3078F DIAST BP <80 MM HG: CPT | Mod: CPTII,S$GLB,, | Performed by: FAMILY MEDICINE

## 2023-06-06 PROCEDURE — 99999 PR PBB SHADOW E&M-EST. PATIENT-LVL V: ICD-10-PCS | Mod: PBBFAC,,, | Performed by: FAMILY MEDICINE

## 2023-06-06 PROCEDURE — 3074F SYST BP LT 130 MM HG: CPT | Mod: CPTII,S$GLB,, | Performed by: FAMILY MEDICINE

## 2023-06-06 PROCEDURE — 99215 PR OFFICE/OUTPT VISIT, EST, LEVL V, 40-54 MIN: ICD-10-PCS | Mod: S$GLB,,, | Performed by: FAMILY MEDICINE

## 2023-06-06 PROCEDURE — 1160F RVW MEDS BY RX/DR IN RCRD: CPT | Mod: CPTII,S$GLB,, | Performed by: FAMILY MEDICINE

## 2023-06-06 PROCEDURE — 1101F PT FALLS ASSESS-DOCD LE1/YR: CPT | Mod: CPTII,S$GLB,, | Performed by: FAMILY MEDICINE

## 2023-06-06 PROCEDURE — 1159F MED LIST DOCD IN RCRD: CPT | Mod: CPTII,S$GLB,, | Performed by: FAMILY MEDICINE

## 2023-06-06 PROCEDURE — 99215 OFFICE O/P EST HI 40 MIN: CPT | Mod: S$GLB,,, | Performed by: FAMILY MEDICINE

## 2023-06-06 PROCEDURE — 3288F FALL RISK ASSESSMENT DOCD: CPT | Mod: CPTII,S$GLB,, | Performed by: FAMILY MEDICINE

## 2023-06-06 PROCEDURE — 99499 RISK ADDL DX/OHS AUDIT: ICD-10-PCS | Mod: HCNC,S$GLB,, | Performed by: FAMILY MEDICINE

## 2023-06-06 PROCEDURE — 1126F AMNT PAIN NOTED NONE PRSNT: CPT | Mod: CPTII,S$GLB,, | Performed by: FAMILY MEDICINE

## 2023-06-06 PROCEDURE — 3078F PR MOST RECENT DIASTOLIC BLOOD PRESSURE < 80 MM HG: ICD-10-PCS | Mod: CPTII,S$GLB,, | Performed by: FAMILY MEDICINE

## 2023-06-06 PROCEDURE — 99999 PR PBB SHADOW E&M-EST. PATIENT-LVL V: CPT | Mod: PBBFAC,,, | Performed by: FAMILY MEDICINE

## 2023-06-06 PROCEDURE — 3288F PR FALLS RISK ASSESSMENT DOCUMENTED: ICD-10-PCS | Mod: CPTII,S$GLB,, | Performed by: FAMILY MEDICINE

## 2023-06-06 PROCEDURE — 1159F PR MEDICATION LIST DOCUMENTED IN MEDICAL RECORD: ICD-10-PCS | Mod: CPTII,S$GLB,, | Performed by: FAMILY MEDICINE

## 2023-06-06 PROCEDURE — 1101F PR PT FALLS ASSESS DOC 0-1 FALLS W/OUT INJ PAST YR: ICD-10-PCS | Mod: CPTII,S$GLB,, | Performed by: FAMILY MEDICINE

## 2023-06-06 PROCEDURE — 99499 UNLISTED E&M SERVICE: CPT | Mod: HCNC,S$GLB,, | Performed by: FAMILY MEDICINE

## 2023-06-06 RX ORDER — PREDNISONE 20 MG/1
20 TABLET ORAL DAILY
Qty: 7 TABLET | Refills: 0 | Status: SHIPPED | OUTPATIENT
Start: 2023-06-06 | End: 2023-06-29

## 2023-06-06 RX ORDER — MIRTAZAPINE 7.5 MG/1
7.5 TABLET, FILM COATED ORAL NIGHTLY
Qty: 30 TABLET | Refills: 11 | Status: SHIPPED | OUTPATIENT
Start: 2023-06-06 | End: 2024-06-05

## 2023-06-06 NOTE — Clinical Note
Zac Martinez, I put in for prolia for this pt. Has esrd on dialysis , dxa and recent normal calcium on labs. Thanks!

## 2023-06-06 NOTE — PROGRESS NOTES
(Portions of this note were dictated using voice recognition software and may contain dictation related errors in spelling/grammar/syntax not found on text review)    CC:   Chief Complaint   Patient presents with    Follow-up     Nauseated and short of breath 2-3 weeks ago       HPI: 77 y.o. female Annual exam 04/25/2023.  Medical history including COPD followed by pulmonology, chronic diastolic heart failure followed by Cardiology, ESRD on hemodialysis followed by Nephrology, significant anxiety followed by Psychiatry    Presented to ED on 05/23 secondary to chest pain and coughing which was productive with yellow phlegm, increased shortness of breath, subjective fever.  Chest x-ray was done showing chronic small left pleural effusion with left base atelectasis and band of atelectasis right lung base.  No consolidation.  EKG showed sinus tachycardia, low-voltage QRS, right axis deviation, poor precordial R-wave progression with no change from December 2022 study.  Chest pain was reproducible with coughing.  COVID test was negative.  Was discharged with Tessalon and Maria    [facility] pulmonology appointment coming up on 06/29/2023.  She is currently on Trelegy for her COPD along with home oxygen    Feels like her breathing is improved somewhat since ED but not back to baseline.  Coughing is still present.  No fevers or chills.  No chest pain.  No swelling in the legs..  Does ask for medication help with appetite as she feels like this is an problem chronically.    Has had CTA showing pulmonary nodules.  Test was done in October of 2022  1. Examination considered diagnostic to the proximal segmental pulmonary arterial level without convincing evidence of acute pulmonary embolism.  2. Scattered peribronchovascular distribution small solid nodules and geographic ground-glass type attenuation could relate to infectious or inflammatory etiology.  3. Several additional solid nodules are noted, appearing to measure up to 30 mm  in the right lower lobe the appearance of which may be accentuated by scarring.  For multiple solid nodules with any 6 mm or greater, Fleischner Society guidelines recommend follow up with non-contrast chest CT at 3-6 months and 18-24 months after discovery.  4. Bilateral pleural effusions and small right pericardial effusion.  As seen on the 10/16/2022 abdomen and pelvis CT, the left pleural effusion appears loculated.  Empyema, bowel with pleural effusion, pleural mesothelioma, and prior pleurodesis sequela centered.  5. Additional details, as provided in the body report.      Past Medical History:   Diagnosis Date    Acute congestive heart failure 02/10/2020    Anemia     Bilateral renal cysts     Cataract     Chronic LBP 7/26/2012    Chronic pain     CKD (chronic kidney disease), stage IV     Colon polyp 2013    COPD (chronic obstructive pulmonary disease)     Dehydration     Encounter for blood transfusion     HTN (hypertension)     Lumbar spondylosis     Melanoma     of the lip    Metabolic bone disease     Migraines, neuralgic     Osteoporosis     Primary osteoarthritis of both knees     s/p Rt TKA    Pulmonary embolism with infarction     Seizures 1972    x1 only    Subdeltoid bursitis, L>R. 9/27/2012    Ulcer     Vitamin D deficiency disease        Past Surgical History:   Procedure Laterality Date    BLADDER SUSPENSION      CATARACT EXTRACTION  11/18/13    left eye    CERVICAL LAMINECTOMY      x3, fusion x1    COLONOSCOPY  2009    DECLOTTING OF ARTERIOVENOUS GRAFT Left 1/3/2022    Procedure: [id number];  Surgeon: Lindsey Louie MD;  Location: [facility] OR;  Service: Vascular;  Laterality: Left;    DECLOTTING OF ARTERIOVENOUS GRAFT Left 2/8/2022    Procedure: [id number];  Surgeon: Lindsey Louie MD;  Location: [facility] OR;  Service: Vascular;  Laterality: Left;    DECLOTTING OF ARTERIOVENOUS GRAFT Left 4/8/2022    Procedure: [id number];  Surgeon: Lindsey Louie MD;   Location: [facility] OR;  Service: Vascular;  Laterality: Left;    FISTULOGRAM N/A 2/27/2020    Procedure: Fistulogram;  Surgeon: Noe Benitez MD;  Location: [facility] CATH LAB/EP;  Service: Cardiology;  Laterality: N/A;    HYSTERECTOMY      JOINT REPLACEMENT  2001    total right knee     LUMBAR LAMINECTOMY      x 3, fusion x1    OOPHORECTOMY      PERIPHERAL ANGIOGRAPHY N/A 3/9/2020    Procedure: Peripheral angiography;  Surgeon: Jacinto Henriquez MD;  Location: [facility] CATH LAB/EP;  Service: Cardiology;  Laterality: N/A;    PLACEMENT OF ARTERIOVENOUS GRAFT Left 1/21/2020    Procedure: INSERTION, GRAFT, ARTERIOVENOUS;  Surgeon: Lindsey Louie MD;  Location: [facility] OR;  Service: General;  Laterality: Left;    PLACEMENT OF ARTERIOVENOUS GRAFT Right 7/22/2022    Procedure: INSERTION, GRAFT, ARTERIOVENOUS;  Surgeon: Lindsey Louie MD;  Location: [facility];  Service: General;  Laterality: Right;    PLACEMENT OF DUAL-LUMEN VASCULAR CATHETER Right 4/16/2022    Procedure: INSERTION-CATHETER-RICKI;  Surgeon: Lindsey Louie MD;  Location: [facility] OR;  Service: General;  Laterality: Right;    THROMBECTOMY Left 2/3/2020    Procedure: THROMBECTOMY;  Surgeon: Lindsey Louie MD;  Location: [facility];  Service: General;  Laterality: Left;    THROMBECTOMY  3/9/2020    Procedure: Thrombectomy;  Surgeon: Jacinto Henriquez MD;  Location: [facility] CATH LAB/EP;  Service: Cardiology;;    THROMBECTOMY Left 4/7/2022    Procedure: THROMBECTOMY;  Surgeon: Lindsey Louie MD;  Location: [facility];  Service: General;  Laterality: Left;       Family History   Problem Relation Age of Onset    Arthritis Mother     Stroke Mother     Hypertension Father     Cancer Father     Cataracts Father     Diabetes Maternal Aunt     Hypertension Maternal Grandfather     Heart disease Maternal Grandfather     Heart attack Maternal Grandfather     Cataracts Sister     Glaucoma Cousin        Social History     Tobacco Use    Smoking status: Former     Types:  "Cigarettes    Smokeless tobacco: Former     Quit date: 2/3/2015   Substance Use Topics    Alcohol use: Not Currently     Comment: Rare    Drug use: No       Lab Results   Component Value Date    WBC 10.45 05/23/2023    HGB 10.8 (L) 05/23/2023    HCT 35.3 (L) 05/23/2023     (H) 05/23/2023     05/23/2023    CHOL 166 07/17/2019    TRIG 146 07/17/2019    HDL 47 07/17/2019    ALT 13 05/23/2023    AST 19 05/23/2023    BILITOT 0.4 05/23/2023    ALKPHOS 294 (H) 05/23/2023     (L) 05/23/2023    K 3.8 05/23/2023    CL 98 05/23/2023    CREATININE 4.2 (H) 05/23/2023    ESTGFRAFRICA 23 (A) 07/22/2022    EGFRNONAA 20 (A) 07/22/2022    EGFRNORACEVR 10 (A) 05/23/2023    CALCIUM 9.6 05/23/2023    ALBUMIN 2.9 (L) 05/23/2023    BUN 17 05/23/2023    CO2 24 05/23/2023    TSH 2.189 02/23/2020    INR 1.1 11/10/2020    HGBA1C 5.2 01/22/2018    MICALBCREAT 11.7 07/24/2018    LDLCALC 89.8 07/17/2019    GLU 96 05/23/2023    HQPTAVVQ70MG 26 (L) 02/12/2020         Hemoglobin (g/dL)   Date Value   05/23/2023 10.8 (L)   12/12/2022 7.8 (L)   12/11/2022 8.6 (L)   12/10/2022 9.4 (L)   12/09/2022 9.2 (L)   11/24/2022 9.7 (L)   11/04/2022 9.5 (L)   10/21/2022 11.9 (L)   10/19/2022 10.9 (L)   10/18/2022 12.2             Vital signs reviewed  Vitals:    06/06/23 1350   BP: 90/62   BP Location: Left arm   Patient Position: Sitting   BP Method: Medium (Manual)   Pulse: 104   Temp: 97.5 °F (36.4 °C)   TempSrc: Oral   SpO2: (!) 92%   Weight: 41.2 kg (90 lb 13.3 oz)   Height: 5' 2" (1.575 m)       Wt Readings from Last 4 Encounters:   06/06/23 41.2 kg (90 lb 13.3 oz)   05/23/23 42.6 kg (94 lb)   04/25/23 42.4 kg (93 lb 7.6 oz)   01/17/23 40.4 kg (89 lb)       PE:   APPEARANCE: Well nourished, well developed, in no acute distress.    HEAD: Normocephalic, atraumatic.  NECK: Supple with no cervical lymphadenopathy.    CHEST: Good inspiratory effort.  Decreased breath sounds at bases, crackles heard right base.  CARDIOVASCULAR: Normal S1, S2. " No rubs, murmurs, or gallops.  ABDOMEN: Bowel sounds normal. Not distended. Soft. No tenderness or masses. No organomegaly.  EXTREMITIES: No edema      IMPRESSION  1. COPD exacerbation    2. Pulmonary cachexia due to COPD    3. Multiple pulmonary nodules    4. Decreased appetite    5. BMI less than 19,adult    6. ESRD (end stage renal disease) on dialysis    7. Osteoporosis, unspecified osteoporosis type, unspecified pathological fracture presence            PLAN  Orders Placed This Encounter   Procedures    CT Chest Without Contrast     Medications Ordered This Encounter   Medications    mirtazapine (REMERON) 7.5 MG Tab     Sig: Take 1 tablet (7.5 mg total) by mouth every evening.     Dispense:  30 tablet     Refill:  11    predniSONE (DELTASONE) 20 MG tablet     Sig: Take 1 tablet (20 mg total) by mouth once daily.     Dispense:  7 tablet     Refill:  0       Reviewed ED documentation, imaging     trial of prednisone 20 mg daily for 7 days to assist with her respiratory status given her COPD exacerbation prednisone, continue trelegy daily, albuterol prn rescue. Pulm. Appt upcoming end of the month    Pulm nodules noted on prior CTA, update chest ct prior to pulmonology appt.     Osteoporosis, dexa up-to-date in chart 2023.  Had recent calcium level which was normal in ED.  Will send message for Prolia through [facility].    Anorexia: Trial of mirtazapine at bedtime 7.5 mg daily.  Can titrate upward if needing extra help with this.

## 2023-06-13 ENCOUNTER — TELEPHONE (OUTPATIENT)
Dept: INFUSION THERAPY | Facility: HOSPITAL | Age: 78
End: 2023-06-13
Payer: MEDICARE

## 2023-06-13 NOTE — TELEPHONE ENCOUNTER
Called the patient to schedule injection. Spoke with the patient's spouse. States the patient is not home but will have her call infusion center to schedule. Provided with number to infusion center.

## 2023-06-19 ENCOUNTER — TELEPHONE (OUTPATIENT)
Dept: INFUSION THERAPY | Facility: HOSPITAL | Age: 78
End: 2023-06-19
Payer: MEDICARE

## 2023-06-19 NOTE — TELEPHONE ENCOUNTER
Returned the patient's call. Confirmed upcoming injection appt. 6/27 at 2pm. Pt declined first available on 6/23. Prefers Tues or Thurs due to dialysis schedule (MWF)

## 2023-06-23 RX ORDER — HYDROCODONE BITARTRATE AND ACETAMINOPHEN 10; 325 MG/1; MG/1
1 TABLET ORAL 3 TIMES DAILY PRN
Qty: 90 TABLET | Refills: 0 | Status: SHIPPED | OUTPATIENT
Start: 2023-06-23 | End: 2023-07-25 | Stop reason: SDUPTHER

## 2023-06-23 NOTE — TELEPHONE ENCOUNTER
----- Message from Fay Cerda sent at 6/23/2023  1:06 PM CDT -----  Regarding: Refill/appt  Contact: [phone]  Rx Refill/Request         Is this a Refill or New Rx:  Refill         Rx Name and Strength:    -HYDROcodone-acetaminophen (NORCO)  mg per tablet 90 tablet        Preferred Pharmacy with phone number:      [facility]  [address] [address]  Phone: [phone] Fax: [phone]      Additional Information: Pt is calling to for an refill and also would like nurse to get her a phone call for scheduling an appt. Pt states she has been trying to get an appt for months. Please call

## 2023-06-27 ENCOUNTER — INFUSION (OUTPATIENT)
Dept: INFUSION THERAPY | Facility: HOSPITAL | Age: 78
End: 2023-06-27
Attending: FAMILY MEDICINE
Payer: MEDICARE

## 2023-06-27 VITALS — HEIGHT: 62 IN | BODY MASS INDEX: 16.71 KG/M2 | WEIGHT: 90.81 LBS

## 2023-06-27 DIAGNOSIS — M81.0 OSTEOPOROSIS, UNSPECIFIED OSTEOPOROSIS TYPE, UNSPECIFIED PATHOLOGICAL FRACTURE PRESENCE: Primary | ICD-10-CM

## 2023-06-27 DIAGNOSIS — N18.5 CKD (CHRONIC KIDNEY DISEASE), STAGE V: ICD-10-CM

## 2023-06-27 PROCEDURE — 96372 THER/PROPH/DIAG INJ SC/IM: CPT | Mod: HCNC

## 2023-06-27 PROCEDURE — 63600175 PHARM REV CODE 636 W HCPCS: Mod: JZ,JG,HCNC | Performed by: FAMILY MEDICINE

## 2023-06-27 RX ADMIN — DENOSUMAB 60 MG: 60 INJECTION SUBCUTANEOUS at 02:06

## 2023-06-27 NOTE — NURSING
Pt tolerated Prolia injection well. Education provided on taking calcium and vitamin D supplements, along with no dental work 3 months prior to and 3 months after injection. Pt verbalized understanding. Pt will call to schedule next appointment in 6 months.

## 2023-06-29 ENCOUNTER — OFFICE VISIT (OUTPATIENT)
Dept: PULMONOLOGY | Facility: CLINIC | Age: 78
End: 2023-06-29
Payer: MEDICARE

## 2023-06-29 VITALS
WEIGHT: 95 LBS | SYSTOLIC BLOOD PRESSURE: 106 MMHG | HEIGHT: 62 IN | DIASTOLIC BLOOD PRESSURE: 50 MMHG | OXYGEN SATURATION: 93 % | BODY MASS INDEX: 17.48 KG/M2 | HEART RATE: 64 BPM

## 2023-06-29 DIAGNOSIS — J44.1 CHRONIC OBSTRUCTIVE PULMONARY DISEASE WITH ACUTE EXACERBATION: Primary | ICD-10-CM

## 2023-06-29 DIAGNOSIS — J43.2 CENTRILOBULAR EMPHYSEMA: ICD-10-CM

## 2023-06-29 DIAGNOSIS — Z99.2 ESRD (END STAGE RENAL DISEASE) ON DIALYSIS: Chronic | ICD-10-CM

## 2023-06-29 DIAGNOSIS — R64 PULMONARY CACHEXIA DUE TO COPD: ICD-10-CM

## 2023-06-29 DIAGNOSIS — J47.9 BRONCHIECTASIS WITHOUT COMPLICATION: ICD-10-CM

## 2023-06-29 DIAGNOSIS — N18.6 ESRD (END STAGE RENAL DISEASE) ON DIALYSIS: Chronic | ICD-10-CM

## 2023-06-29 DIAGNOSIS — J44.1 COPD EXACERBATION: ICD-10-CM

## 2023-06-29 DIAGNOSIS — R05.3 CHRONIC COUGH: ICD-10-CM

## 2023-06-29 DIAGNOSIS — J90 LOCULATED PLEURAL EFFUSION: ICD-10-CM

## 2023-06-29 DIAGNOSIS — J44.9 PULMONARY CACHEXIA DUE TO COPD: ICD-10-CM

## 2023-06-29 DIAGNOSIS — I50.32 CHRONIC DIASTOLIC HEART FAILURE: ICD-10-CM

## 2023-06-29 PROCEDURE — 3074F SYST BP LT 130 MM HG: CPT | Mod: HCNC,CPTII,S$GLB, | Performed by: INTERNAL MEDICINE

## 2023-06-29 PROCEDURE — 3078F DIAST BP <80 MM HG: CPT | Mod: HCNC,CPTII,S$GLB, | Performed by: INTERNAL MEDICINE

## 2023-06-29 PROCEDURE — 1126F PR PAIN SEVERITY QUANTIFIED, NO PAIN PRESENT: ICD-10-PCS | Mod: HCNC,CPTII,S$GLB, | Performed by: INTERNAL MEDICINE

## 2023-06-29 PROCEDURE — 3288F PR FALLS RISK ASSESSMENT DOCUMENTED: ICD-10-PCS | Mod: HCNC,CPTII,S$GLB, | Performed by: INTERNAL MEDICINE

## 2023-06-29 PROCEDURE — 1101F PT FALLS ASSESS-DOCD LE1/YR: CPT | Mod: HCNC,CPTII,S$GLB, | Performed by: INTERNAL MEDICINE

## 2023-06-29 PROCEDURE — 1159F PR MEDICATION LIST DOCUMENTED IN MEDICAL RECORD: ICD-10-PCS | Mod: HCNC,CPTII,S$GLB, | Performed by: INTERNAL MEDICINE

## 2023-06-29 PROCEDURE — 1126F AMNT PAIN NOTED NONE PRSNT: CPT | Mod: HCNC,CPTII,S$GLB, | Performed by: INTERNAL MEDICINE

## 2023-06-29 PROCEDURE — 99999 PR PBB SHADOW E&M-EST. PATIENT-LVL IV: CPT | Mod: PBBFAC,HCNC,, | Performed by: INTERNAL MEDICINE

## 2023-06-29 PROCEDURE — 3288F FALL RISK ASSESSMENT DOCD: CPT | Mod: HCNC,CPTII,S$GLB, | Performed by: INTERNAL MEDICINE

## 2023-06-29 PROCEDURE — 1101F PR PT FALLS ASSESS DOC 0-1 FALLS W/OUT INJ PAST YR: ICD-10-PCS | Mod: HCNC,CPTII,S$GLB, | Performed by: INTERNAL MEDICINE

## 2023-06-29 PROCEDURE — 99215 PR OFFICE/OUTPT VISIT, EST, LEVL V, 40-54 MIN: ICD-10-PCS | Mod: HCNC,S$GLB,, | Performed by: INTERNAL MEDICINE

## 2023-06-29 PROCEDURE — 3078F PR MOST RECENT DIASTOLIC BLOOD PRESSURE < 80 MM HG: ICD-10-PCS | Mod: HCNC,CPTII,S$GLB, | Performed by: INTERNAL MEDICINE

## 2023-06-29 PROCEDURE — 3074F PR MOST RECENT SYSTOLIC BLOOD PRESSURE < 130 MM HG: ICD-10-PCS | Mod: HCNC,CPTII,S$GLB, | Performed by: INTERNAL MEDICINE

## 2023-06-29 PROCEDURE — 1159F MED LIST DOCD IN RCRD: CPT | Mod: HCNC,CPTII,S$GLB, | Performed by: INTERNAL MEDICINE

## 2023-06-29 PROCEDURE — 99215 OFFICE O/P EST HI 40 MIN: CPT | Mod: HCNC,S$GLB,, | Performed by: INTERNAL MEDICINE

## 2023-06-29 PROCEDURE — 99999 PR PBB SHADOW E&M-EST. PATIENT-LVL IV: ICD-10-PCS | Mod: PBBFAC,HCNC,, | Performed by: INTERNAL MEDICINE

## 2023-06-29 RX ORDER — PREDNISONE 10 MG/1
TABLET ORAL
Qty: 21 TABLET | Refills: 0 | Status: SHIPPED | OUTPATIENT
Start: 2023-06-29 | End: 2023-08-18 | Stop reason: ALTCHOICE

## 2023-06-29 RX ORDER — IPRATROPIUM BROMIDE AND ALBUTEROL SULFATE 2.5; .5 MG/3ML; MG/3ML
3 SOLUTION RESPIRATORY (INHALATION) EVERY 6 HOURS PRN
Qty: 270 ML | Refills: 11 | Status: SHIPPED | OUTPATIENT
Start: 2023-06-29 | End: 2024-06-28

## 2023-06-29 RX ORDER — SODIUM CHLORIDE FOR INHALATION 7 %
4 VIAL, NEBULIZER (ML) INHALATION 2 TIMES DAILY
Qty: 240 ML | Refills: 2 | Status: ON HOLD | OUTPATIENT
Start: 2023-06-29 | End: 2023-10-27 | Stop reason: HOSPADM

## 2023-06-29 RX ORDER — BENZONATATE 100 MG/1
100 CAPSULE ORAL 2 TIMES DAILY PRN
Qty: 60 CAPSULE | Refills: 0 | Status: ON HOLD | OUTPATIENT
Start: 2023-06-29 | End: 2023-10-08

## 2023-06-29 RX ORDER — AZELASTINE 1 MG/ML
1 SPRAY, METERED NASAL 2 TIMES DAILY
Qty: 30 ML | Refills: 3 | Status: ON HOLD | OUTPATIENT
Start: 2023-06-29 | End: 2023-10-01 | Stop reason: SDUPTHER

## 2023-06-29 RX ORDER — ALBUTEROL SULFATE 90 UG/1
2 AEROSOL, METERED RESPIRATORY (INHALATION) EVERY 6 HOURS PRN
Qty: 18 G | Refills: 11 | Status: SHIPPED | OUTPATIENT
Start: 2023-06-29

## 2023-06-29 RX ORDER — FLUTICASONE FUROATE, UMECLIDINIUM BROMIDE AND VILANTEROL TRIFENATATE 200; 62.5; 25 UG/1; UG/1; UG/1
1 POWDER RESPIRATORY (INHALATION) DAILY
Qty: 60 EACH | Refills: 11 | Status: SHIPPED | OUTPATIENT
Start: 2023-06-29

## 2023-06-29 NOTE — PROGRESS NOTES
Follow Up  [facility]    SUBJECTIVE:     Chief Complaint:   COPD    History of Present Illness:  Katie Quevedo is a 78 y.o. female who presents for follow up of COPD & shortness of breath. PMH is significant for ESRD, chronic diastolic CHF, pleural effusion, chronic hypoxemic respiratory failure on home O2. Her last visit with me was two years ago. She has had a couple hospitalizations since her last visit. For example, Oct 2022 she was hospitalized when she was suffering with acute influenza infection.    At baseline she reports MMRC4 symptoms of dyspnea. She uses a motorized scooter for mobility assistance. She notes chronic cough. She previously suffered with unintentional weight loss, & she is underweight, but she has managed to fight this problem & maintain her weight over the past two years.     She uses trelegy once daily for COPD. She needs a new nebulizer.      She has a chronic pleural effusion. The pleural effusion has been sampled twice in the past with negative cytology.    She is a former smoker who quit 9 years ago.    Review of patient's allergies indicates:   Allergen Reactions    Aspirin      Other reaction(s): hx of ulcers    Tetracycline Swelling     Other reaction(s): Swelling    Penicillins Rash     Other reaction(s): Hives  Other reaction(s): Rash  Other reaction(s): Rash  Other reaction(s): Hives       Past Medical History:   Diagnosis Date    Acute congestive heart failure 02/10/2020    Anemia     Bilateral renal cysts     Cataract     Chronic LBP 7/26/2012    Chronic pain     CKD (chronic kidney disease), stage IV     Colon polyp 2013    COPD (chronic obstructive pulmonary disease)     Dehydration     Encounter for blood transfusion     HTN (hypertension)     Lumbar spondylosis     Melanoma     of the lip    Metabolic bone disease     Migraines, neuralgic     Osteoporosis     Primary osteoarthritis of both knees     s/p Rt TKA    Pulmonary embolism with infarction     Seizures 1972     x1 only    Subdeltoid bursitis, L>R. 9/27/2012    Ulcer     Vitamin D deficiency disease      Past Surgical History:   Procedure Laterality Date    BLADDER SUSPENSION      CATARACT EXTRACTION  11/18/13    left eye    CERVICAL LAMINECTOMY      x3, fusion x1    COLONOSCOPY  2009    DECLOTTING OF ARTERIOVENOUS GRAFT Left 1/3/2022    Procedure: [id number];  Surgeon: Lindsey Louie MD;  Location: [facility] OR;  Service: Vascular;  Laterality: Left;    DECLOTTING OF ARTERIOVENOUS GRAFT Left 2/8/2022    Procedure: [id number];  Surgeon: Lidnsey Louie MD;  Location: [facility] OR;  Service: Vascular;  Laterality: Left;    DECLOTTING OF ARTERIOVENOUS GRAFT Left 4/8/2022    Procedure: [id number];  Surgeon: Lindsey Louie MD;  Location: [facility] OR;  Service: Vascular;  Laterality: Left;    FISTULOGRAM N/A 2/27/2020    Procedure: Fistulogram;  Surgeon: Noe Benitez MD;  Location: [facility] CATH LAB/EP;  Service: Cardiology;  Laterality: N/A;    HYSTERECTOMY      JOINT REPLACEMENT  2001    total right knee     LUMBAR LAMINECTOMY      x 3, fusion x1    OOPHORECTOMY      PERIPHERAL ANGIOGRAPHY N/A 3/9/2020    Procedure: Peripheral angiography;  Surgeon: Jacinto Henriquez MD;  Location: [facility] CATH LAB/EP;  Service: Cardiology;  Laterality: N/A;    PLACEMENT OF ARTERIOVENOUS GRAFT Left 1/21/2020    Procedure: INSERTION, GRAFT, ARTERIOVENOUS;  Surgeon: Lindsey Louie MD;  Location: [facility] OR;  Service: General;  Laterality: Left;    PLACEMENT OF ARTERIOVENOUS GRAFT Right 7/22/2022    Procedure: INSERTION, GRAFT, ARTERIOVENOUS;  Surgeon: Lindsey Louie MD;  Location: [facility] OR;  Service: General;  Laterality: Right;    PLACEMENT OF DUAL-LUMEN VASCULAR CATHETER Right 4/16/2022    Procedure: INSERTION-CATHETER-RICKI;  Surgeon: Lindsey Louie MD;  Location: [facility] OR;  Service: General;  Laterality: Right;    THROMBECTOMY Left 2/3/2020    Procedure: THROMBECTOMY;  Surgeon: Lindsey Louie,  "MD;  Location: [facility] OR;  Service: General;  Laterality: Left;    THROMBECTOMY  3/9/2020    Procedure: Thrombectomy;  Surgeon: Jacinto Henriquez MD;  Location: [facility] CATH LAB/EP;  Service: Cardiology;;    THROMBECTOMY Left 4/7/2022    Procedure: THROMBECTOMY;  Surgeon: Lindsey Louie MD;  Location: [facility] OR;  Service: General;  Laterality: Left;     Family History   Problem Relation Age of Onset    Arthritis Mother     Stroke Mother     Hypertension Father     Cancer Father     Cataracts Father     Diabetes Maternal Aunt     Hypertension Maternal Grandfather     Heart disease Maternal Grandfather     Heart attack Maternal Grandfather     Cataracts Sister     Glaucoma Cousin      Social History     Socioeconomic History    Marital status:    Tobacco Use    Smoking status: Former     Types: Cigarettes    Smokeless tobacco: Former     Quit date: 2/3/2015   Substance and Sexual Activity    Alcohol use: Not Currently     Comment: Rare    Drug use: No    Sexual activity: Never     Partners: Male     Review of Systems:  No pertinent problems.    OBJECTIVE:     Vital Signs  Vitals:    06/29/23 1608   BP: (!) 106/50   Pulse: 64   SpO2: (!) 93%   Weight: 43.1 kg (95 lb)   Height: 5' 2" (1.575 m)       Body mass index is 17.38 kg/m².    Physical Exam:  General: no apparent distress. +loss of muscle mass & temporal wasting  Eyes:  conjunctivae/corneas clear  Nose: no discharge  Neck: trachea midline  Lungs:  normal respiratory effort. Diffusely diminished breath sounds.  Neurologic: alert, oriented, thought content appropriate.    Laboratory:  Lab Results   Component Value Date    WBC 10.45 05/23/2023    HGB 10.8 (L) 05/23/2023    HCT 35.3 (L) 05/23/2023     (H) 05/23/2023     05/23/2023     Chest Imaging, My Impression:   CT chest 6/2019: large left pleural effusion, +pericardial effusion, +small mediastinal lymph nodes, no overt lung masses or nodules are apparent  CXR 3/2020: +left pleural effusion is " stable, +hyperinflation, otherwise normal  Chest CT 11/2020: left pleural effusion with adjacent peripheral infiltrate vs atelectasis of the LLL  Chest CT 10/2022: bilateral emphysematous changes, bilateral pleural effusion L > R, bilateral dependent atelectasis    Diagnostic Results:  TTE 2022:  The left ventricle is normal in size with concentric remodeling and hyperdynamic systolic function.  The estimated ejection fraction is 75%.  Normal left ventricular diastolic function.  The estimated PA systolic pressure is 18 mmHg.  Normal right ventricular size with normal right ventricular systolic function.  Normal central venous pressure (3 mmHg).    PFT 2/2018: + obstruction, FEV1 1.20 (55% predicted), +air trapping, +hyperinflation, DLCO 47% of predicted  PFT 2019: +obstruction, FEV1 50% of predicted, +air trapping, DLCO diminished    ASSESSMENT/PLAN:     Problem Noted   Esrd (End Stage Renal Disease) On Dialysis       She attends dialysis three days per week with Dr Montemayor.     Copd with acute exacerbation / Emphsyema     Pt has MMRC 4 symptoms of dyspnea at baseline. Recommend continuing trelegy inhaler & prn nebulizer treatments.      Chronic Diastolic Heart Failure     Continue follow up with nephrology for management of fluid status.     Chronic Respiratory Failure With Hypoxia, On Home O2 Therapy     Continue supplemental oxygen.     Chronic, Left Pleural Effusion     Cytology negative x2. Pt is not a good candidate for thoracic surgical intervention. Treatment is supportive.     Pulmonary Cachexia     Recommend dietary supplementation & attempting to increase caloric intake. Discussed that low body weight is associated with worse outcomes in COPD.     Giancarlo Rust MD  [facility]

## 2023-07-02 PROBLEM — I50.9 ACUTE CONGESTIVE HEART FAILURE: Status: RESOLVED | Noted: 2020-02-10 | Resolved: 2023-07-02

## 2023-07-06 DIAGNOSIS — F43.23 ADJUSTMENT REACTION WITH ANXIETY AND DEPRESSION: ICD-10-CM

## 2023-07-06 RX ORDER — LORAZEPAM 0.5 MG/1
0.5 TABLET ORAL DAILY PRN
Qty: 30 TABLET | Refills: 1 | Status: SHIPPED | OUTPATIENT
Start: 2023-07-06 | End: 2023-07-17 | Stop reason: SDUPTHER

## 2023-07-17 DIAGNOSIS — F43.23 ADJUSTMENT REACTION WITH ANXIETY AND DEPRESSION: ICD-10-CM

## 2023-07-17 RX ORDER — LORAZEPAM 0.5 MG/1
0.5 TABLET ORAL DAILY PRN
Qty: 30 TABLET | Refills: 1 | Status: SHIPPED | OUTPATIENT
Start: 2023-07-17 | End: 2023-08-18 | Stop reason: SDUPTHER

## 2023-07-25 NOTE — TELEPHONE ENCOUNTER
----- Message from Miranda Bolaños sent at 7/25/2023 10:08 AM CDT -----  Regarding: Refill  Contact: Pt [phone]  Pt is calling to get a refill on Rx: HYDROcodone-acetaminophen (NORCO)  mg per tablet 90 tablet please call       [facility]  [address] [address]  [place] [address]  Phone: [phone] Fax: [phone]

## 2023-07-26 RX ORDER — HYDROCODONE BITARTRATE AND ACETAMINOPHEN 10; 325 MG/1; MG/1
1 TABLET ORAL 3 TIMES DAILY PRN
Qty: 90 TABLET | Refills: 0 | Status: SHIPPED | OUTPATIENT
Start: 2023-07-27 | End: 2023-08-29 | Stop reason: SDUPTHER

## 2023-08-10 NOTE — TELEPHONE ENCOUNTER
Good luck with biopsy tomorrow!    Follow up with me in next 2-4 weeks to discuss best path forward.     Try and get labs done with procedure tomorrow!    Best,  Dr. Adhikari   Last Rx refill-----01/13/22  Last office visit--10/28/21  Next office visit--04/19/22

## 2023-08-17 ENCOUNTER — CARE AT HOME (OUTPATIENT)
Dept: HOME HEALTH SERVICES | Facility: CLINIC | Age: 78
End: 2023-08-17
Payer: MEDICARE

## 2023-08-17 DIAGNOSIS — N18.6 ESRD (END STAGE RENAL DISEASE) ON DIALYSIS: Chronic | ICD-10-CM

## 2023-08-17 DIAGNOSIS — Z99.2 ESRD (END STAGE RENAL DISEASE) ON DIALYSIS: Chronic | ICD-10-CM

## 2023-08-17 DIAGNOSIS — Z99.81 CHRONIC RESPIRATORY FAILURE WITH HYPOXIA, ON HOME O2 THERAPY: Chronic | ICD-10-CM

## 2023-08-17 DIAGNOSIS — J44.1 COPD EXACERBATION: ICD-10-CM

## 2023-08-17 DIAGNOSIS — F13.20 BENZODIAZEPINE DEPENDENCE, CONTINUOUS: ICD-10-CM

## 2023-08-17 DIAGNOSIS — F43.23 ADJUSTMENT REACTION WITH ANXIETY AND DEPRESSION: Primary | ICD-10-CM

## 2023-08-17 DIAGNOSIS — J96.11 CHRONIC RESPIRATORY FAILURE WITH HYPOXIA, ON HOME O2 THERAPY: Chronic | ICD-10-CM

## 2023-08-17 PROCEDURE — 99349 PR HOME VISIT,ESTAB PATIENT,LEVEL III: ICD-10-PCS | Mod: S$GLB,,, | Performed by: NURSE PRACTITIONER

## 2023-08-17 PROCEDURE — 99349 HOME/RES VST EST MOD MDM 40: CPT | Mod: S$GLB,,, | Performed by: NURSE PRACTITIONER

## 2023-08-18 PROBLEM — F13.20 BENZODIAZEPINE DEPENDENCE, CONTINUOUS: Status: ACTIVE | Noted: 2023-08-18

## 2023-08-18 RX ORDER — LORAZEPAM 0.5 MG/1
0.5 TABLET ORAL EVERY 8 HOURS PRN
Qty: 90 TABLET | Refills: 1 | Status: ON HOLD | OUTPATIENT
Start: 2023-08-18 | End: 2023-10-27 | Stop reason: SDUPTHER

## 2023-08-18 NOTE — ASSESSMENT & PLAN NOTE
Pt does not want to use Busipar  Has not taken in months  Anxiety related to health and dialysis, breathing  Has used benzos most of her adult life  Ativan in place  Monitor

## 2023-08-18 NOTE — PROGRESS NOTES
[facility]  Medical Home Visit    Visit Date: 8/18/2023  Encounter Provider: Katharine Urbina  PCP:  Kingston Verduzco MD    Subjective:      Patient ID: Katie Quevedo is a 78 y.o. female.    Consult Requested By:  No ref. provider found  Reason for Consult:  COPD, Anxiety    Katie is being seen at home due to being seen at home due to physical debility that presents a taxing effort to leave the home, to mitigate high risk of hospital readmission and/or due to the limited availability of reliable or safe options for transportation to the point of access to the provider. Prior to treatment on this visit the chart was reviewed and patient verbal consent was obtained.    Chief Complaint: No chief complaint on file.      Pt is being seen today in her home. She has a history of ESRD, COPD with oxygen dependence, anxiety and depression, HLD. She attends dialysis on MWF.  She had dialysis yesterday.    Ms Quevedo reports she continues to have SOB at times, she can occasionally take breaks from her oxygen but does use when is active. She is using 2-3L via NC. It does help when she is SOB.  She is complaint with all medications.  She has extreme severe anxiety before and during dialysis treatments. She previously had her  go with and sit with her to try to remain calm but as he his health has declined he has not been able to go for a significant time and she has been using Ativan before sessions, which is helpful. She reports she occasionally takes one before bedtime to help her sleep as she has had life long anxiety. She is requesting Ativan increase. She reports she has to take 2 sometimes before dialysis and she takes 1 at bedtime. She does take 1 during the day on non-dialysis days. She is running out of medication each month.  Reviewed fall risk with Ativan and pt understands to use medication as prescribed and fall precautions  Understands benefit vs risk, pt has taken Benzo most of her adult life            Review of Systems   Constitutional:  Negative for activity change, fatigue and fever.   HENT: Negative.     Eyes: Negative.    Respiratory:  Positive for shortness of breath. Negative for chest tightness.    Cardiovascular: Negative.  Negative for leg swelling.   Gastrointestinal: Negative.    Endocrine: Negative.    Genitourinary: Negative.    Musculoskeletal: Negative.    Skin: Negative.    Allergic/Immunologic: Negative.    Neurological: Negative.    Hematological: Negative.    Psychiatric/Behavioral:  Positive for sleep disturbance. Negative for agitation. The patient is nervous/anxious.    All other systems reviewed and are negative.      Assessments:  Environmental: single story home, lives with spouse, open paths, adequate light and temp  Functional Status: independent  Safety: fall risk  Nutritional: adequate available  Home Health/DME/Supplies: none/oxygen    Objective:   Physical Exam  Vitals reviewed.   Constitutional:       General: She is not in acute distress.     Appearance: She is well-developed. She is ill-appearing (thin).   HENT:      Head: Normocephalic and atraumatic.      Nose: Nose normal.      Mouth/Throat:      Mouth: Mucous membranes are dry.   Eyes:      Pupils: Pupils are equal, round, and reactive to light.   Cardiovascular:      Rate and Rhythm: Normal rate and regular rhythm.      Heart sounds: Normal heart sounds.   Pulmonary:      Effort: Pulmonary effort is normal.      Comments: Diminished to all fields  Oxygen in use at 2L  Abdominal:      General: Bowel sounds are normal.      Palpations: Abdomen is soft.   Musculoskeletal:         General: Normal range of motion.      Cervical back: Normal range of motion and neck supple.   Skin:     General: Skin is warm and dry.      Findings: Bruising present.   Neurological:      Mental Status: She is alert and oriented to person, place, and time.      Cranial Nerves: No cranial nerve deficit.   Psychiatric:         Behavior: Behavior  normal.         Thought Content: Thought content normal.         Judgment: Judgment normal.         There were no vitals filed for this visit.    There is no height or weight on file to calculate BMI.    Assessment:     1. Adjustment reaction with anxiety and depression    2. Chronic respiratory failure with hypoxia, on home O2 therapy    3. severe stage 4 COPD    4. ESRD (end stage renal disease) on dialysis    5. Benzodiazepine dependence, continuous          Plan:            Problem List Items Addressed This Visit          Psychiatric    Adjustment reaction with anxiety and depression - Primary    Current Assessment & Plan     Pt does not want to use Busipar  Has not taken in months  Anxiety related to health and dialysis, breathing  Has used benzos most of her adult life  Ativan in place  Monitor         Relevant Medications    LORazepam (ATIVAN) 0.5 MG tablet    Benzodiazepine dependence, continuous    Current Assessment & Plan     Daily Ativan use for anxiety  Has used most of her adult life  Understands risk  Monitor            Pulmonary    Chronic respiratory failure with hypoxia, on home O2 therapy (Chronic)    Overview     Home Oxygen    Face to face visit with pt regarding their home oxygen.  We have discussed the need for oxygen including continuous use, prn use or night time use (as appropriate for the pt).  We discussed the need for compliance with the therapy. We will do recertifications as necessary.            Current Assessment & Plan     Chronic in her stable state  Continue nebs and inhaler  Oxygen in use  Monitor           severe stage 4 COPD    Current Assessment & Plan     See resp failure            Renal/    ESRD (end stage renal disease) on dialysis (Chronic)    Overview                Current Assessment & Plan     HD MWF               Were controlled substances prescribed?  Yes, and the [place] [facility] Controlled Substance Database was reviewed.  Total visit time: 45 min  Follow Up  Appointments:   Future Appointments   Date Time Provider Department Center   11/2/2023 10:00 AM Pushpa Mcmahon MD [facility]       Signature: Katharine Urbina NP

## 2023-08-21 RX ORDER — MEGESTROL ACETATE 20 MG/1
TABLET ORAL
Qty: 15 TABLET | Refills: 1 | Status: CANCELLED | OUTPATIENT
Start: 2023-08-21

## 2023-08-23 DIAGNOSIS — E44.0 MODERATE PROTEIN-CALORIE MALNUTRITION: Primary | ICD-10-CM

## 2023-08-23 RX ORDER — MEGESTROL ACETATE 20 MG/1
20 TABLET ORAL 2 TIMES DAILY
Qty: 60 TABLET | Refills: 11 | Status: SHIPPED | OUTPATIENT
Start: 2023-08-23 | End: 2024-08-22

## 2023-08-23 NOTE — PROGRESS NOTES
Pt previously using megace for appetite stimulant.  Reports appetite is poor again and she would like to resume the med as it was helpful before

## 2023-08-29 RX ORDER — HYDROCODONE BITARTRATE AND ACETAMINOPHEN 10; 325 MG/1; MG/1
1 TABLET ORAL 3 TIMES DAILY PRN
Qty: 90 TABLET | Refills: 0 | Status: SHIPPED | OUTPATIENT
Start: 2023-08-30 | End: 2023-09-26 | Stop reason: SDUPTHER

## 2023-09-06 RX ORDER — MEGESTROL ACETATE 20 MG/1
TABLET ORAL
Qty: 15 TABLET | Refills: 1 | OUTPATIENT
Start: 2023-09-06

## 2023-09-23 DIAGNOSIS — N39.0 URINARY TRACT INFECTION WITHOUT HEMATURIA, SITE UNSPECIFIED: Primary | ICD-10-CM

## 2023-09-23 RX ORDER — NITROFURANTOIN 25; 75 MG/1; MG/1
100 CAPSULE ORAL 2 TIMES DAILY
Qty: 10 CAPSULE | Refills: 0 | Status: SHIPPED | OUTPATIENT
Start: 2023-09-23 | End: 2023-09-29

## 2023-09-23 RX ORDER — PHENAZOPYRIDINE HYDROCHLORIDE 100 MG/1
100 TABLET, FILM COATED ORAL 3 TIMES DAILY PRN
Qty: 30 TABLET | Refills: 0 | Status: SHIPPED | OUTPATIENT
Start: 2023-09-23 | End: 2023-09-29

## 2023-09-26 RX ORDER — HYDROCODONE BITARTRATE AND ACETAMINOPHEN 10; 325 MG/1; MG/1
1 TABLET ORAL 3 TIMES DAILY PRN
Qty: 90 TABLET | Refills: 0 | Status: SHIPPED | OUTPATIENT
Start: 2023-09-26 | End: 2023-10-29

## 2023-09-28 ENCOUNTER — TELEPHONE (OUTPATIENT)
Dept: HOME HEALTH SERVICES | Facility: CLINIC | Age: 78
End: 2023-09-28

## 2023-09-28 ENCOUNTER — CARE AT HOME (OUTPATIENT)
Dept: HOME HEALTH SERVICES | Facility: CLINIC | Age: 78
End: 2023-09-28
Payer: MEDICARE

## 2023-09-28 VITALS — RESPIRATION RATE: 18 BRPM | HEART RATE: 94 BPM

## 2023-09-28 DIAGNOSIS — J18.9 COMMUNITY ACQUIRED PNEUMONIA OF LEFT LOWER LOBE OF LUNG: ICD-10-CM

## 2023-09-28 DIAGNOSIS — R06.02 SOB (SHORTNESS OF BREATH): Primary | ICD-10-CM

## 2023-09-28 DIAGNOSIS — J44.1 CHRONIC OBSTRUCTIVE PULMONARY DISEASE WITH ACUTE EXACERBATION: ICD-10-CM

## 2023-09-28 PROCEDURE — 99348 HOME/RES VST EST LOW MDM 30: CPT | Mod: S$GLB,,, | Performed by: NURSE PRACTITIONER

## 2023-09-28 PROCEDURE — 99499 UNLISTED E&M SERVICE: CPT | Mod: S$GLB,,, | Performed by: NURSE PRACTITIONER

## 2023-09-28 PROCEDURE — 99499 RISK ADDL DX/OHS AUDIT: ICD-10-PCS | Mod: S$GLB,,, | Performed by: NURSE PRACTITIONER

## 2023-09-28 PROCEDURE — 99348 PR HOME VISIT,ESTAB PATIENT,LEVEL II: ICD-10-PCS | Mod: S$GLB,,, | Performed by: NURSE PRACTITIONER

## 2023-09-28 RX ORDER — PREDNISONE 10 MG/1
10 TABLET ORAL DAILY
Qty: 7 TABLET | Refills: 0 | Status: ON HOLD | OUTPATIENT
Start: 2023-09-28 | End: 2023-10-01 | Stop reason: HOSPADM

## 2023-09-28 NOTE — TELEPHONE ENCOUNTER
Faxed orders and demographics to [facility] for Chest Xray per NP request.  Fax confirmation received.     Goal Outcome Evaluation:   VSS. A&OX4. Pain managed with PRN valium. 02 @ 2lpm added overnight for low sats while sleeping. Up with assist x1. Call light in reach.

## 2023-09-28 NOTE — PROGRESS NOTES
[facility]  Medical Chronic Care Home Visit    Encounter Provider: Katharine Urbina   PCP: Kingston Verdzuco MD  Consult Requested By: No ref. provider found    HISTORY OF PRESENT ILLNESS      Patient ID: Katie Quevedo is a 78 y.o. female is being seen in the home due to physical debility that presents a taxing effort to leave the home, to mitigate high risk of hospital readmission and/or due to the limited availability of reliable or safe options for transportation to the point of access to the provider. Prior to treatment on this visit the chart was reviewed and patient verbal consent was obtained.    Chronic medical conditions synopsis:    Past Medical History:   Diagnosis Date    Acute congestive heart failure 02/10/2020    Anemia     Bilateral renal cysts     Cataract     Chronic LBP 7/26/2012    Chronic pain     CKD (chronic kidney disease), stage IV     Colon polyp 2013    COPD (chronic obstructive pulmonary disease)     Dehydration     Encounter for blood transfusion     HTN (hypertension)     Lumbar spondylosis     Melanoma     of the lip    Metabolic bone disease     Migraines, neuralgic     Osteoporosis     Primary osteoarthritis of both knees     s/p Rt TKA    Pulmonary embolism with infarction     Seizures 1972    x1 only    Subdeltoid bursitis, L>R. 9/27/2012    Ulcer     Vitamin D deficiency disease      Pt is a 79 y/o female with ESRD on HD, COPD, malnutrition, anxiety. Her daughter asked for this visit today as pt has not been well. She is found lying in her bed, AAOx3, reports increased SOB, cough. She had a complete dialysis session yesterday. She is using her inhalers and nasal spray    DECISION MAKING TODAY       Assessment & Plan:  1. SOB (shortness of breath)  -     X-Ray Chest 1 View; Future; Expected date: 09/28/2023    2. Chronic obstructive pulmonary disease with acute exacerbation  Assessment & Plan:  Chronic, oxygen dependent.  Reports increasing SOB and cough for a few  days  Wet heavy cough noted. Sat stable 97 on 1.5L  Using inhalers and nebs as ordered  Dialysis yesterday, no signs of volume overload  CXR ordered, lungs diminished  Prednisone to pharmacy    Orders:  -     predniSONE (DELTASONE) 10 MG tablet; Take 1 tablet (10 mg total) by mouth once daily. for 7 days  Dispense: 7 tablet; Refill: 0           ENVIRONMENT OF CARE      Family and/or Caregiver present at visit?  Yes  Name of Caregiver: Dre Quevedo  History provided by: patient    Advance Care Planning   Advanced Care Planning Status:  Patient has had an ACP conversation  Living Will: No  Power of : No  [place]: Yes    Does Caregiver have HCPoA: No  Changes today: None       Impression upon entering the home:  Physical Dwelling: single family home   Appearance of home environment: cleaniness: clean  Functional Status: minimal assistance  Mobility: ambulatory with device  Nutritional access: available food but inadequate intake  Home Health: No, and does not need it at this time   DME/Supplies: rolling walker, oxygen, and wheelchair     Diagnostic tests reviewed/disposition: No diagnosic tests pending after this hospitalization.  Disease/illness education: COPD  Establishment or re-establishment of referral orders for community resources: No other necessary community resources.   Discussion with other health care providers: No discussion with other health care providers necessary.   Does patient have a PCP at [facility]? Yes   Repatriation plan with PCP? [facility] reason: mobility   Does patient have an ostomy (ileostomy, colostomy, suprapubic catheter, nephrostomy tube, tracheostomy, PEG tube, pleurex catheter, cholecystostomy, etc)? No  Were BPAs reviewed? Yes    Social History     Socioeconomic History    Marital status:    Tobacco Use    Smoking status: Former     Types: Cigarettes    Smokeless tobacco: Former     Quit date: 2/3/2015   Substance and Sexual Activity    Alcohol use: Not Currently     Comment:  Rare    Drug use: No    Sexual activity: Never     Partners: Male     Social Determinants of Health     Financial Resource Strain: Low Risk  (4/8/2022)    Overall Financial Resource Strain (CARDIA)     Difficulty of Paying Living Expenses: Not hard at all   Food Insecurity: No Food Insecurity (4/8/2022)    Hunger Vital Sign     Worried About Running Out of Food in the Last Year: Never true     Ran Out of Food in the Last Year: Never true   Transportation Needs: No Transportation Needs (4/8/2022)    PRAPARE - Transportation     Lack of Transportation (Medical): No     Lack of Transportation (Non-Medical): No   Physical Activity: Inactive (4/8/2022)    Exercise Vital Sign     Days of Exercise per Week: 0 days     Minutes of Exercise per Session: 0 min   Stress: No Stress Concern Present (4/8/2022)    [employer] - Occupational Stress Questionnaire     Feeling of Stress : Only a little   Housing Stability: Low Risk  (4/8/2022)    Housing Stability Vital Sign     Unable to Pay for Housing in the Last Year: No     Number of Places Lived in the Last Year: 1     Unstable Housing in the Last Year: No         OBJECTIVE:     Vital Signs:  Vitals:    09/28/23 1300   Pulse: 94   Resp: 18       Review of Systems   Constitutional:  Negative for activity change, fatigue and fever.   HENT: Negative.     Eyes: Negative.    Respiratory:  Positive for cough and shortness of breath. Negative for chest tightness.    Cardiovascular: Negative.  Negative for leg swelling.   Gastrointestinal: Negative.    Skin: Negative.    Neurological: Negative.    Hematological: Negative.    Psychiatric/Behavioral: Negative.  Negative for agitation.    All other systems reviewed and are negative.      Physical Exam:  Physical Exam  Vitals reviewed.   Constitutional:       General: She is not in acute distress.     Appearance: She is well-developed. She is ill-appearing.      Comments: thin   HENT:      Head: Normocephalic and  atraumatic.      Nose: Nose normal.      Mouth/Throat:      Mouth: Mucous membranes are dry.   Eyes:      Pupils: Pupils are equal, round, and reactive to light.   Cardiovascular:      Rate and Rhythm: Normal rate and regular rhythm.      Heart sounds: Normal heart sounds.   Pulmonary:      Effort: Pulmonary effort is normal.      Comments: Diminished to bases, occ rhonchi to LLL  Abdominal:      General: Bowel sounds are normal.      Palpations: Abdomen is soft.   Musculoskeletal:      Cervical back: Normal range of motion and neck supple.      Comments: Muscle atrophy   Skin:     General: Skin is warm and dry.   Neurological:      Mental Status: She is alert and oriented to person, place, and time.      Cranial Nerves: No cranial nerve deficit.   Psychiatric:         Behavior: Behavior normal.         Thought Content: Thought content normal.         Judgment: Judgment normal.         INSTRUCTIONS FOR PATIENT:     Scheduled Follow-up, Appts Reviewed with Modifications if Needed: Yes  Future Appointments   Date Time Provider Department Center   11/2/2023 10:00 AM Pushpa Mcmahon MD [facility]         Current Outpatient Medications:     acetaminophen (TYLENOL) 325 MG tablet, Take 1 tablet (325 mg total) by mouth every 6 (six) hours as needed for Pain., Disp: 10 tablet, Rfl: 0    albuterol (PROVENTIL/VENTOLIN HFA) 90 mcg/actuation inhaler, Inhale 2 puffs into the lungs every 6 (six) hours as needed for Wheezing. Rescue, Disp: 18 g, Rfl: 11    albuterol-ipratropium (DUO-NEB) 2.5 mg-0.5 mg/3 mL nebulizer solution, Take 3 mLs by nebulization every 6 (six) hours as needed for Shortness of Breath. Rescue, Disp: 270 mL, Rfl: 11    allopurinoL (ZYLOPRIM) 100 MG tablet, Take 1 tablet (100 mg total) by mouth on Tuesday, Thursday, Saturday, and Sunday., Disp: 30 tablet, Rfl: 0    azelastine (ASTELIN) 137 mcg (0.1 %) nasal spray, 1 spray (137 mcg total) by Nasal route 2 (two) times daily., Disp: 30 mL, Rfl: 3     benzonatate (TESSALON) 100 MG capsule, Take 1 capsule (100 mg total) by mouth 2 (two) times daily as needed for Cough., Disp: 60 capsule, Rfl: 0    diclofenac sodium (VOLTAREN) 1 % Gel, Apply 2 g topically 3 (three) times daily., Disp: , Rfl:     fluticasone-umeclidin-vilanter (TRELEGY ELLIPTA) 200-62.5-25 mcg inhaler, Inhale 1 puff into the lungs once daily., Disp: 60 each, Rfl: 11    furosemide (LASIX) 80 MG tablet, Take 1 tablet (80 mg total) by mouth 2 (two) times daily On non dialysis days Tuesday-Thursday- Saturday -Sunday, Disp: 60 tablet, Rfl: 11    HYDROcodone-acetaminophen (NORCO)  mg per tablet, Take 1 tablet by mouth 3 (three) times daily as needed for Pain., Disp: 90 tablet, Rfl: 0    LORazepam (ATIVAN) 0.5 MG tablet, Take 1 tablet (0.5 mg total) by mouth every 8 (eight) hours as needed for Anxiety (use 1-2 before dialysis for anxiety, prn bedtime)., Disp: 90 tablet, Rfl: 1    megestroL (MEGACE) 20 MG Tab, Take 1 tablet (20 mg total) by mouth 2 (two) times daily., Disp: 60 tablet, Rfl: 11    midodrine (PROAMATINE) 10 MG tablet, Take 1 tablet (10 mg total) by mouth in the morning and 1 tablet (10 mg total) in the evening and 1 tablet (10 mg total) before bedtime., Disp: 90 tablet, Rfl: 3    mirtazapine (REMERON) 7.5 MG Tab, Take 1 tablet (7.5 mg total) by mouth every evening., Disp: 30 tablet, Rfl: 11    nitrofurantoin, macrocrystal-monohydrate, (MACROBID) 100 MG capsule, Take 1 capsule (100 mg total) by mouth 2 (two) times daily. for 5 days, Disp: 10 capsule, Rfl: 0    ondansetron 4 mg/2 mL Soln, , Disp: , Rfl:     phenazopyridine (PYRIDIUM) 100 MG tablet, Take 1 tablet (100 mg total) by mouth 3 (three) times daily as needed for Pain., Disp: 30 tablet, Rfl: 0    predniSONE (DELTASONE) 10 MG tablet, Take 1 tablet (10 mg total) by mouth once daily. for 7 days, Disp: 7 tablet, Rfl: 0    sevelamer carbonate (RENVELA) 800 mg Tab, Take 1 tablet by mouth 3 times a day, Disp: 90 tablet, Rfl: 12    sodium  chloride 7% 7 % nebulizer solution, Take 4 mLs by nebulization 2 (two) times a day., Disp: 240 mL, Rfl: 2        Signature: Katharine Urbina NP

## 2023-09-28 NOTE — ASSESSMENT & PLAN NOTE
Chronic, oxygen dependent.  Reports increasing SOB and cough for a few days  Wet heavy cough noted. Sat stable 97 on 1.5L  Using inhalers and nebs as ordered  Dialysis yesterday, no signs of volume overload  CXR ordered, lungs diminished  Prednisone to pharmacy

## 2023-09-29 ENCOUNTER — TELEPHONE (OUTPATIENT)
Dept: HOME HEALTH SERVICES | Facility: CLINIC | Age: 78
End: 2023-09-29
Payer: MEDICARE

## 2023-09-29 ENCOUNTER — HOSPITAL ENCOUNTER (INPATIENT)
Facility: HOSPITAL | Age: 78
LOS: 2 days | Discharge: HOME HEALTH | DRG: 871 | End: 2023-10-01
Attending: EMERGENCY MEDICINE | Admitting: HOSPITALIST
Payer: MEDICARE

## 2023-09-29 DIAGNOSIS — I95.9 HYPOTENSION, UNSPECIFIED HYPOTENSION TYPE: ICD-10-CM

## 2023-09-29 DIAGNOSIS — J96.21 ACUTE ON CHRONIC RESPIRATORY FAILURE WITH HYPOXIA: ICD-10-CM

## 2023-09-29 DIAGNOSIS — J18.9 COMMUNITY ACQUIRED PNEUMONIA OF LEFT LOWER LOBE OF LUNG: Primary | ICD-10-CM

## 2023-09-29 DIAGNOSIS — R06.02 SOB (SHORTNESS OF BREATH): ICD-10-CM

## 2023-09-29 DIAGNOSIS — J18.9 SEPSIS DUE TO PNEUMONIA: ICD-10-CM

## 2023-09-29 DIAGNOSIS — N18.6 ESRD (END STAGE RENAL DISEASE): ICD-10-CM

## 2023-09-29 DIAGNOSIS — A41.9 SEPSIS DUE TO PNEUMONIA: ICD-10-CM

## 2023-09-29 PROBLEM — Z71.89 GOALS OF CARE, COUNSELING/DISCUSSION: Status: ACTIVE | Noted: 2023-09-29

## 2023-09-29 LAB
ALBUMIN SERPL BCP-MCNC: 3.3 G/DL (ref 3.5–5.2)
ALP SERPL-CCNC: 77 U/L (ref 55–135)
ALT SERPL W/O P-5'-P-CCNC: 13 U/L (ref 10–44)
ANION GAP SERPL CALC-SCNC: 17 MMOL/L (ref 8–16)
AST SERPL-CCNC: 15 U/L (ref 10–40)
BASOPHILS # BLD AUTO: 0.07 K/UL (ref 0–0.2)
BASOPHILS NFR BLD: 0.4 % (ref 0–1.9)
BILIRUB SERPL-MCNC: 0.3 MG/DL (ref 0.1–1)
BNP SERPL-MCNC: 143 PG/ML (ref 0–99)
BUN SERPL-MCNC: 44 MG/DL (ref 8–23)
CALCIUM SERPL-MCNC: 8.8 MG/DL (ref 8.7–10.5)
CHLORIDE SERPL-SCNC: 102 MMOL/L (ref 95–110)
CO2 SERPL-SCNC: 19 MMOL/L (ref 23–29)
CREAT SERPL-MCNC: 6.9 MG/DL (ref 0.5–1.4)
CTP QC/QA: YES
CTP QC/QA: YES
DIFFERENTIAL METHOD: ABNORMAL
EOSINOPHIL # BLD AUTO: 0.1 K/UL (ref 0–0.5)
EOSINOPHIL NFR BLD: 0.3 % (ref 0–8)
ERYTHROCYTE [DISTWIDTH] IN BLOOD BY AUTOMATED COUNT: 14.6 % (ref 11.5–14.5)
EST. GFR  (NO RACE VARIABLE): 6 ML/MIN/1.73 M^2
GLUCOSE SERPL-MCNC: 79 MG/DL (ref 70–110)
HCT VFR BLD AUTO: 39.5 % (ref 37–48.5)
HGB BLD-MCNC: 12.5 G/DL (ref 12–16)
IMM GRANULOCYTES # BLD AUTO: 0.43 K/UL (ref 0–0.04)
IMM GRANULOCYTES NFR BLD AUTO: 2.4 % (ref 0–0.5)
LACTATE SERPL-SCNC: 1.3 MMOL/L (ref 0.5–2.2)
LYMPHOCYTES # BLD AUTO: 1 K/UL (ref 1–4.8)
LYMPHOCYTES NFR BLD: 5.8 % (ref 18–48)
MAGNESIUM SERPL-MCNC: 2.5 MG/DL (ref 1.6–2.6)
MCH RBC QN AUTO: 32.8 PG (ref 27–31)
MCHC RBC AUTO-ENTMCNC: 31.6 G/DL (ref 32–36)
MCV RBC AUTO: 104 FL (ref 82–98)
MONOCYTES # BLD AUTO: 0.6 K/UL (ref 0.3–1)
MONOCYTES NFR BLD: 3.6 % (ref 4–15)
NEUTROPHILS # BLD AUTO: 15.6 K/UL (ref 1.8–7.7)
NEUTROPHILS NFR BLD: 87.5 % (ref 38–73)
NRBC BLD-RTO: 0 /100 WBC
PLATELET # BLD AUTO: 269 K/UL (ref 150–450)
PMV BLD AUTO: 10 FL (ref 9.2–12.9)
POC MOLECULAR INFLUENZA A AGN: NEGATIVE
POC MOLECULAR INFLUENZA B AGN: NEGATIVE
POTASSIUM SERPL-SCNC: 5.1 MMOL/L (ref 3.5–5.1)
PROT SERPL-MCNC: 7.5 G/DL (ref 6–8.4)
RBC # BLD AUTO: 3.81 M/UL (ref 4–5.4)
SARS-COV-2 RDRP RESP QL NAA+PROBE: NEGATIVE
SODIUM SERPL-SCNC: 138 MMOL/L (ref 136–145)
TROPONIN I SERPL DL<=0.01 NG/ML-MCNC: 0.04 NG/ML (ref 0–0.03)
TROPONIN I SERPL DL<=0.01 NG/ML-MCNC: 0.05 NG/ML (ref 0–0.03)
TROPONIN I SERPL DL<=0.01 NG/ML-MCNC: 0.06 NG/ML (ref 0–0.03)
WBC # BLD AUTO: 17.83 K/UL (ref 3.9–12.7)

## 2023-09-29 PROCEDURE — 63600175 PHARM REV CODE 636 W HCPCS: Mod: HCNC | Performed by: HOSPITALIST

## 2023-09-29 PROCEDURE — 83880 ASSAY OF NATRIURETIC PEPTIDE: CPT | Mod: HCNC | Performed by: EMERGENCY MEDICINE

## 2023-09-29 PROCEDURE — 84484 ASSAY OF TROPONIN QUANT: CPT | Mod: 91,HCNC | Performed by: HOSPITALIST

## 2023-09-29 PROCEDURE — 25000003 PHARM REV CODE 250: Mod: HCNC | Performed by: EMERGENCY MEDICINE

## 2023-09-29 PROCEDURE — 36415 COLL VENOUS BLD VENIPUNCTURE: CPT | Mod: HCNC | Performed by: HOSPITALIST

## 2023-09-29 PROCEDURE — 87635 SARS-COV-2 COVID-19 AMP PRB: CPT | Mod: HCNC | Performed by: EMERGENCY MEDICINE

## 2023-09-29 PROCEDURE — 93005 ELECTROCARDIOGRAM TRACING: CPT | Mod: HCNC

## 2023-09-29 PROCEDURE — 85025 COMPLETE CBC W/AUTO DIFF WBC: CPT | Mod: HCNC | Performed by: EMERGENCY MEDICINE

## 2023-09-29 PROCEDURE — 96365 THER/PROPH/DIAG IV INF INIT: CPT | Mod: HCNC

## 2023-09-29 PROCEDURE — 63600175 PHARM REV CODE 636 W HCPCS: Mod: HCNC | Performed by: EMERGENCY MEDICINE

## 2023-09-29 PROCEDURE — 93010 EKG 12-LEAD: ICD-10-PCS | Mod: HCNC,,, | Performed by: INTERNAL MEDICINE

## 2023-09-29 PROCEDURE — 11000001 HC ACUTE MED/SURG PRIVATE ROOM: Mod: HCNC

## 2023-09-29 PROCEDURE — 87502 INFLUENZA DNA AMP PROBE: CPT | Mod: HCNC

## 2023-09-29 PROCEDURE — 80053 COMPREHEN METABOLIC PANEL: CPT | Mod: HCNC | Performed by: EMERGENCY MEDICINE

## 2023-09-29 PROCEDURE — 25000003 PHARM REV CODE 250: Mod: HCNC | Performed by: HOSPITALIST

## 2023-09-29 PROCEDURE — 84484 ASSAY OF TROPONIN QUANT: CPT | Mod: HCNC | Performed by: EMERGENCY MEDICINE

## 2023-09-29 PROCEDURE — 83605 ASSAY OF LACTIC ACID: CPT | Mod: HCNC | Performed by: EMERGENCY MEDICINE

## 2023-09-29 PROCEDURE — 83735 ASSAY OF MAGNESIUM: CPT | Mod: HCNC | Performed by: EMERGENCY MEDICINE

## 2023-09-29 PROCEDURE — 87040 BLOOD CULTURE FOR BACTERIA: CPT | Mod: HCNC | Performed by: EMERGENCY MEDICINE

## 2023-09-29 PROCEDURE — 93010 ELECTROCARDIOGRAM REPORT: CPT | Mod: HCNC,,, | Performed by: INTERNAL MEDICINE

## 2023-09-29 PROCEDURE — 99285 EMERGENCY DEPT VISIT HI MDM: CPT | Mod: 25,HCNC

## 2023-09-29 RX ORDER — POLYETHYLENE GLYCOL 3350 17 G/17G
17 POWDER, FOR SOLUTION ORAL DAILY
Status: DISCONTINUED | OUTPATIENT
Start: 2023-09-30 | End: 2023-10-01 | Stop reason: HOSPADM

## 2023-09-29 RX ORDER — HEPARIN SODIUM 5000 [USP'U]/ML
5000 INJECTION, SOLUTION INTRAVENOUS; SUBCUTANEOUS EVERY 12 HOURS
Status: DISCONTINUED | OUTPATIENT
Start: 2023-09-29 | End: 2023-10-01 | Stop reason: HOSPADM

## 2023-09-29 RX ORDER — MUPIROCIN 20 MG/G
OINTMENT TOPICAL 2 TIMES DAILY
Status: DISCONTINUED | OUTPATIENT
Start: 2023-09-29 | End: 2023-10-01 | Stop reason: HOSPADM

## 2023-09-29 RX ORDER — SODIUM CHLORIDE 0.9 % (FLUSH) 0.9 %
3 SYRINGE (ML) INJECTION
Status: DISCONTINUED | OUTPATIENT
Start: 2023-09-29 | End: 2023-10-01 | Stop reason: HOSPADM

## 2023-09-29 RX ORDER — HYDROCODONE BITARTRATE AND ACETAMINOPHEN 10; 325 MG/1; MG/1
1 TABLET ORAL
COMMUNITY
Start: 2023-08-28 | End: 2023-09-29 | Stop reason: DRUGHIGH

## 2023-09-29 RX ORDER — DICLOFENAC SODIUM 10 MG/G
2 GEL TOPICAL 3 TIMES DAILY
Status: DISCONTINUED | OUTPATIENT
Start: 2023-09-29 | End: 2023-10-01 | Stop reason: HOSPADM

## 2023-09-29 RX ORDER — BUSPIRONE HYDROCHLORIDE 10 MG/1
10 TABLET ORAL 2 TIMES DAILY
Status: ON HOLD | COMMUNITY
Start: 2023-08-28 | End: 2023-10-27 | Stop reason: SDUPTHER

## 2023-09-29 RX ORDER — FUROSEMIDE 80 MG/1
1 TABLET ORAL
COMMUNITY
Start: 2023-09-01 | End: 2023-09-29 | Stop reason: DRUGHIGH

## 2023-09-29 RX ORDER — MEGESTROL ACETATE 40 MG/1
1 TABLET ORAL
COMMUNITY
Start: 2023-08-28 | End: 2023-09-29 | Stop reason: DRUGHIGH

## 2023-09-29 RX ORDER — ALLOPURINOL 100 MG/1
100 TABLET ORAL DAILY
Status: DISCONTINUED | OUTPATIENT
Start: 2023-09-30 | End: 2023-10-01 | Stop reason: HOSPADM

## 2023-09-29 RX ORDER — AZITHROMYCIN 250 MG/1
500 TABLET, FILM COATED ORAL EVERY 24 HOURS
Status: DISCONTINUED | OUTPATIENT
Start: 2023-09-30 | End: 2023-10-01 | Stop reason: HOSPADM

## 2023-09-29 RX ORDER — PREDNISONE 20 MG/1
40 TABLET ORAL DAILY
Status: DISCONTINUED | OUTPATIENT
Start: 2023-09-29 | End: 2023-10-01 | Stop reason: HOSPADM

## 2023-09-29 RX ORDER — TALC
6 POWDER (GRAM) TOPICAL NIGHTLY PRN
Status: DISCONTINUED | OUTPATIENT
Start: 2023-09-29 | End: 2023-10-01 | Stop reason: HOSPADM

## 2023-09-29 RX ORDER — HYDROCODONE BITARTRATE AND ACETAMINOPHEN 10; 325 MG/1; MG/1
1 TABLET ORAL EVERY 8 HOURS PRN
Status: DISCONTINUED | OUTPATIENT
Start: 2023-09-29 | End: 2023-10-01 | Stop reason: HOSPADM

## 2023-09-29 RX ORDER — MIRTAZAPINE 7.5 MG/1
7.5 TABLET, FILM COATED ORAL NIGHTLY
Status: DISCONTINUED | OUTPATIENT
Start: 2023-09-29 | End: 2023-10-01 | Stop reason: HOSPADM

## 2023-09-29 RX ORDER — FUROSEMIDE 40 MG/1
80 TABLET ORAL 2 TIMES DAILY
Status: DISCONTINUED | OUTPATIENT
Start: 2023-09-29 | End: 2023-10-01 | Stop reason: HOSPADM

## 2023-09-29 RX ORDER — FOLIC ACID/VIT B COMPLEX AND C 0.8 MG
0.8 TABLET ORAL
COMMUNITY
Start: 2023-08-28

## 2023-09-29 RX ORDER — ENOXAPARIN SODIUM 100 MG/ML
40 INJECTION SUBCUTANEOUS EVERY 24 HOURS
Status: DISCONTINUED | OUTPATIENT
Start: 2023-09-29 | End: 2023-09-29

## 2023-09-29 RX ORDER — BENZONATATE 100 MG/1
100 CAPSULE ORAL 2 TIMES DAILY PRN
Status: DISCONTINUED | OUTPATIENT
Start: 2023-09-29 | End: 2023-10-01 | Stop reason: HOSPADM

## 2023-09-29 RX ORDER — LEVOFLOXACIN 500 MG/1
500 TABLET, FILM COATED ORAL DAILY
Qty: 7 TABLET | Refills: 0 | Status: ON HOLD | OUTPATIENT
Start: 2023-09-29 | End: 2023-10-01 | Stop reason: SDUPTHER

## 2023-09-29 RX ORDER — CYPROHEPTADINE HYDROCHLORIDE 4 MG/1
1 TABLET ORAL 3 TIMES DAILY PRN
COMMUNITY
Start: 2023-08-28

## 2023-09-29 RX ORDER — ACETAMINOPHEN 325 MG/1
650 TABLET ORAL EVERY 8 HOURS PRN
Status: DISCONTINUED | OUTPATIENT
Start: 2023-09-29 | End: 2023-10-01 | Stop reason: HOSPADM

## 2023-09-29 RX ORDER — MEGESTROL ACETATE 20 MG/1
20 TABLET ORAL 2 TIMES DAILY
Status: DISCONTINUED | OUTPATIENT
Start: 2023-09-29 | End: 2023-10-01 | Stop reason: HOSPADM

## 2023-09-29 RX ORDER — ONDANSETRON 8 MG/1
8 TABLET, ORALLY DISINTEGRATING ORAL EVERY 8 HOURS PRN
Status: DISCONTINUED | OUTPATIENT
Start: 2023-09-29 | End: 2023-10-01 | Stop reason: HOSPADM

## 2023-09-29 RX ORDER — FLUTICASONE FUROATE AND VILANTEROL 100; 25 UG/1; UG/1
1 POWDER RESPIRATORY (INHALATION) DAILY
Status: DISCONTINUED | OUTPATIENT
Start: 2023-09-30 | End: 2023-10-01 | Stop reason: HOSPADM

## 2023-09-29 RX ORDER — IPRATROPIUM BROMIDE AND ALBUTEROL SULFATE 2.5; .5 MG/3ML; MG/3ML
3 SOLUTION RESPIRATORY (INHALATION) EVERY 6 HOURS PRN
Status: DISCONTINUED | OUTPATIENT
Start: 2023-09-29 | End: 2023-10-01 | Stop reason: HOSPADM

## 2023-09-29 RX ORDER — LORAZEPAM 0.5 MG/1
0.5 TABLET ORAL EVERY 12 HOURS PRN
Status: DISCONTINUED | OUTPATIENT
Start: 2023-09-29 | End: 2023-10-01 | Stop reason: HOSPADM

## 2023-09-29 RX ADMIN — MIRTAZAPINE 7.5 MG: 7.5 TABLET ORAL at 09:09

## 2023-09-29 RX ADMIN — DICLOFENAC SODIUM 2 G: 10 GEL TOPICAL at 10:09

## 2023-09-29 RX ADMIN — CEFTRIAXONE 1 G: 1 INJECTION, POWDER, FOR SOLUTION INTRAMUSCULAR; INTRAVENOUS at 03:09

## 2023-09-29 RX ADMIN — PREDNISONE 40 MG: 20 TABLET ORAL at 03:09

## 2023-09-29 RX ADMIN — MEGESTROL ACETATE 20 MG: 20 TABLET ORAL at 10:09

## 2023-09-29 RX ADMIN — FUROSEMIDE 80 MG: 40 TABLET ORAL at 09:09

## 2023-09-29 RX ADMIN — HYDROCODONE BITARTRATE AND ACETAMINOPHEN 1 TABLET: 10; 325 TABLET ORAL at 10:09

## 2023-09-29 RX ADMIN — AZITHROMYCIN MONOHYDRATE 500 MG: 500 INJECTION, POWDER, LYOPHILIZED, FOR SOLUTION INTRAVENOUS at 04:09

## 2023-09-29 RX ADMIN — MUPIROCIN: 20 OINTMENT TOPICAL at 09:09

## 2023-09-29 RX ADMIN — ONDANSETRON 8 MG: 8 TABLET, ORALLY DISINTEGRATING ORAL at 10:09

## 2023-09-29 RX ADMIN — SODIUM CHLORIDE 500 ML: 9 INJECTION, SOLUTION INTRAVENOUS at 03:09

## 2023-09-29 RX ADMIN — Medication 6 MG: at 10:09

## 2023-09-29 RX ADMIN — HEPARIN SODIUM 5000 UNITS: 5000 INJECTION INTRAVENOUS; SUBCUTANEOUS at 09:09

## 2023-09-29 NOTE — ED NOTES
Pt reports SOB x 3 days along with productive cough. States she does wear O2 at home 3L, approx 95% on 3L today. States she received an xray outpatient and was sent to ED for evaluation for possible pneumonia.

## 2023-09-29 NOTE — PHARMACY MED REC
"Admission Medication History     The home medication history was taken by Meera Infante CPhT.    Medication history obtained from, Patient Verified    You may go to "Admission" then "Reconcile Home Medications" tabs to review and/or act upon these items.     The home medication list has been updated by the Pharmacy department.   Please read ALL comments highlighted in yellow.   Please address this information as you see fit.    Feel free to contact us if you have any questions or require assistance.      The medications listed below were removed from the home medication list.  Please reorder if appropriate:  Patient reports no longer taking the following medication(s):  Acetaminophen 325 mg  Allopurinol 100 mg  Macrobid 100 mg  Phenazopyridine 100 mg        Meera Infante CPhT.  Ext [phone]               .          "

## 2023-09-29 NOTE — ASSESSMENT & PLAN NOTE
-concern for acute exacerbation given worsening shortness of breath and increased sputum production  -initiated on COPD pathway   -prednisone and antibiotics, DuoNebs; continue home supplemental oxygen   -switch home trilogy inhaler to formulary  -needs outpatient pulmonology follow-up and would benefit from pulmonary rehab

## 2023-09-29 NOTE — ASSESSMENT & PLAN NOTE
-likely not the best choice for this patient   -will continue p.r.n. for now social avoid withdrawal

## 2023-09-29 NOTE — HPI
Ms. Quevedo is an 79yo woman with ESRD on dialysis, hypertension, diastolic heart failure, and COPD on home oxygen who presents with worsening shortness of breath.  She states that over the past month or so she has been gradually feeling some worsening of her respiratory status but over the past 3 days it has acutely gotten much worse.  She is had a cough during this time that has been productive of sputum.  Reports having some shortness of breath at rest, which is worse than baseline.  Normally she is able to walk through room by gripping onto furniture; unable to cross the room without having to take a rest due to her breathing.  Denies any chest pain, palpitations, fevers, or chills.  She has been compliant with her dialysis; last dialysis session was on Wednesday.  When she went to dialysis today, she was told that she was breathing too fast and had to fast heart rate to safely undergo hemodialysis.  Of note, she had spoken with her primary care physician the day previously; they called in an antibiotic and steroids.  She took a dose of steroid but had not yet picked up the antibiotic prescription.

## 2023-09-29 NOTE — ASSESSMENT & PLAN NOTE
This patient does have evidence of infective focus  My overall impression is sepsis.  Source: Respiratory  Antibiotics given-   Antibiotics (72h ago, onward)    Start     Stop Route Frequency Ordered    09/30/23 1500  cefTRIAXone (ROCEPHIN) 1 g in dextrose 5 % in water (D5W) 100 mL IVPB (MB+)  (CEFTRIAXONE IV/ AZITHROMYCIN PO PANEL)        See Hyperspace for full Linked Orders Report.    -- IV Every 24 hours (non-standard times) 09/29/23 1517    09/30/23 0900  azithromycin tablet 500 mg  (CEFTRIAXONE IV/ AZITHROMYCIN PO PANEL)        See Hyperspace for full Linked Orders Report.    -- Oral Daily 09/29/23 1517    09/29/23 1500  azithromycin (ZITHROMAX) 500 mg in dextrose 5 % (D5W) 250 mL IVPB (Vial-Mate)         09/30/23 0259 IV ED 1 Time 09/29/23 1446        Latest lactate reviewed-  Recent Labs   Lab 09/29/23  1505   LACTATE 1.3     Organ dysfunction indicated by Acute respiratory failure    Fluid challenge Contraindicated- Fluid bolus is contraindicated in this patient due to End Stage Renal Disease     Post- resuscitation assessment No - Post resuscitation assessment not needed       Will Not start Pressors- Levophed for MAP of 65  Source control achieved by:  IV antibiotics

## 2023-09-29 NOTE — NURSING
Pt now on unit from ED. VSS, nadn, on 3 L o2 per NC, RR increased lower 30s/tachypneic, sob noted with act. BBS diminished. Aaox4, frances, gen weakness noted. Follows comm's. Tele box placed on pt as ordered. ST noted on mon. With Vs's complete. PIV x 2 SL'ed. Secure, d/I. Pt is a dialysis pt, states she voids maybe one to 2 x a day.   Afebrile. Skin d/I. No c/o pain/cp, does c/o sob currently, o2 sat wnls. Pt did not want to take her pants/socks/tank top off, gown on over tank top. Did not get her HD yesterday, plan is for her to get HD tomorrow per Dr. Hernández. Spouse at bs. Call light given to pt. Pt instructed to always call for asst and/or for asst to BR. States understanding. See flow sheet for yelena info.

## 2023-09-29 NOTE — ASSESSMENT & PLAN NOTE
Patient with Hypoxic Respiratory failure which is Acute on chronic.  she is on home oxygen at 3 LPM. Supplemental oxygen was provided and noted-      .   Signs/symptoms of respiratory failure include- tachypnea, increased work of breathing and respiratory distress. Contributing diagnoses includes - COPD and Pneumonia Labs and images were reviewed. Patient Has not had a recent ABG. Will treat underlying causes and adjust management of respiratory failure as follows- IV antibiotics, dialysis, steroids

## 2023-09-29 NOTE — Clinical Note
Diagnosis: Sepsis due to pneumonia [[id number]]   Admitting Provider:: BERTA GREEN [[id number]]   Future Attending Provider: BERTA GREEN [[id number]]   Reason for IP Medical Treatment  (Clinical interventions that can only be accomplished in the IP setting? ) :: sepsis due to pneumonia   I certify that Inpatient services for greater than or equal to 2 midnights are medically necessary:: Yes   Plans for Post-Acute care--if anticipated (pick the single best option):: A. No post acute care anticipated at this time   Special Needs:: No Special Needs [1]

## 2023-09-29 NOTE — H&P
[facility]  [facility] Physical    Patient Name: Katie Quevedo  MRN: [id number]  Patient Class: IP- Inpatient  Admission Date: 9/29/2023  Attending Physician: Moses Hernández MD  Primary Care Provider: Kingston Verduzco MD         Patient information was obtained from patient, relative(s), past medical records and ER records.     Subjective:     Principal Problem:Acute on chronic respiratory failure with hypoxia    Chief Complaint:   Chief Complaint   Patient presents with    Shortness of Breath     Seen by HH this morning and had CXR for c/o SOB x 3 days. XR showed pneumonia and sent here for further treatment. Presents awake, alert. States she has been having SOB for past 3 days with cough. No fever reported. + CP since last night. On arrival, awake, alert. Resp moderately labored.         HPI: Ms. Quevedo is an 77yo woman with ESRD on dialysis, hypertension, diastolic heart failure, and COPD on home oxygen who presents with worsening shortness of breath.  She states that over the past month or so she has been gradually feeling some worsening of her respiratory status but over the past 3 days it has acutely gotten much worse.  She is had a cough during this time that has been productive of sputum.  Reports having some shortness of breath at rest, which is worse than baseline.  Normally she is able to walk through room by gripping onto furniture; unable to cross the room without having to take a rest due to her breathing.  Denies any chest pain, palpitations, fevers, or chills.  She has been compliant with her dialysis; last dialysis session was on Wednesday.  When she went to dialysis today, she was told that she was breathing too fast and had to fast heart rate to safely undergo hemodialysis.  Of note, she had spoken with her primary care physician the day previously; they called in an antibiotic and steroids.  She took a dose of steroid but had not yet picked up the antibiotic  prescription.      Past Medical History:   Diagnosis Date    Acute congestive heart failure 02/10/2020    Anemia     Bilateral renal cysts     Cataract     Chronic LBP 7/26/2012    Chronic pain     CKD (chronic kidney disease), stage IV     Colon polyp 2013    COPD (chronic obstructive pulmonary disease)     Dehydration     Encounter for blood transfusion     HTN (hypertension)     Lumbar spondylosis     Melanoma     of the lip    Metabolic bone disease     Migraines, neuralgic     Osteoporosis     Primary osteoarthritis of both knees     s/p Rt TKA    Pulmonary embolism with infarction     Seizures 1972    x1 only    Subdeltoid bursitis, L>R. 9/27/2012    Ulcer     Vitamin D deficiency disease        Past Surgical History:   Procedure Laterality Date    BLADDER SUSPENSION      CATARACT EXTRACTION  11/18/13    left eye    CERVICAL LAMINECTOMY      x3, fusion x1    COLONOSCOPY  2009    DECLOTTING OF ARTERIOVENOUS GRAFT Left 1/3/2022    Procedure: LEGVVZGANJ-UGRDV-DT;  Surgeon: Lindsey Louie MD;  Location: [facility] OR;  Service: Vascular;  Laterality: Left;    DECLOTTING OF ARTERIOVENOUS GRAFT Left 2/8/2022    Procedure: GEAQFABEBE-CRUTG-RU;  Surgeon: Lindsey Louie MD;  Location: [facility] OR;  Service: Vascular;  Laterality: Left;    DECLOTTING OF ARTERIOVENOUS GRAFT Left 4/8/2022    Procedure: RMKKVLHIGY-YSGVJ-MS;  Surgeon: Lindsey Louie MD;  Location: [facility] OR;  Service: Vascular;  Laterality: Left;    FISTULOGRAM N/A 2/27/2020    Procedure: Fistulogram;  Surgeon: Noe Benitez MD;  Location: [facility] CATH LAB/EP;  Service: Cardiology;  Laterality: N/A;    HYSTERECTOMY      JOINT REPLACEMENT  2001    total right knee     LUMBAR LAMINECTOMY      x 3, fusion x1    OOPHORECTOMY      PERIPHERAL ANGIOGRAPHY N/A 3/9/2020    Procedure: Peripheral angiography;  Surgeon: Jacinto Henriquez MD;  Location: [facility] CATH LAB/EP;  Service: Cardiology;  Laterality: N/A;    PLACEMENT OF  ARTERIOVENOUS GRAFT Left 1/21/2020    Procedure: INSERTION, GRAFT, ARTERIOVENOUS;  Surgeon: Lindsey Louie MD;  Location: [facility] OR;  Service: General;  Laterality: Left;    PLACEMENT OF ARTERIOVENOUS GRAFT Right 7/22/2022    Procedure: INSERTION, GRAFT, ARTERIOVENOUS;  Surgeon: Lindsey Louie MD;  Location: [facility] OR;  Service: General;  Laterality: Right;    PLACEMENT OF DUAL-LUMEN VASCULAR CATHETER Right 4/16/2022    Procedure: INSERTION-CATHETER-RICKI;  Surgeon: Lindsey Louie MD;  Location: [facility] OR;  Service: General;  Laterality: Right;    THROMBECTOMY Left 2/3/2020    Procedure: THROMBECTOMY;  Surgeon: Lindsey Louie MD;  Location: [facility] OR;  Service: General;  Laterality: Left;    THROMBECTOMY  3/9/2020    Procedure: Thrombectomy;  Surgeon: Jacinto Henriquez MD;  Location: [facility] CATH LAB/EP;  Service: Cardiology;;    THROMBECTOMY Left 4/7/2022    Procedure: THROMBECTOMY;  Surgeon: Lindsey Louie MD;  Location: [facility] OR;  Service: General;  Laterality: Left;       Review of patient's allergies indicates:   Allergen Reactions    Aspirin      Other reaction(s): hx of ulcers    Tetracycline Swelling     Other reaction(s): Swelling    Penicillins Rash     Other reaction(s): Hives  Other reaction(s): Rash  Other reaction(s): Rash  Other reaction(s): Hives       No current facility-administered medications on file prior to encounter.     Current Outpatient Medications on File Prior to Encounter   Medication Sig    acetaminophen (TYLENOL) 325 MG tablet Take 1 tablet (325 mg total) by mouth every 6 (six) hours as needed for Pain.    albuterol (PROVENTIL/VENTOLIN HFA) 90 mcg/actuation inhaler Inhale 2 puffs into the lungs every 6 (six) hours as needed for Wheezing. Rescue    albuterol-ipratropium (DUO-NEB) 2.5 mg-0.5 mg/3 mL nebulizer solution Take 3 mLs by nebulization every 6 (six) hours as needed for Shortness of Breath. Rescue    allopurinoL (ZYLOPRIM) 100 MG tablet Take 1  tablet (100 mg total) by mouth on Tuesday, Thursday, Saturday, and .    azelastine (ASTELIN) 137 mcg (0.1 %) nasal spray 1 spray (137 mcg total) by Nasal route 2 (two) times daily.    benzonatate (TESSALON) 100 MG capsule Take 1 capsule (100 mg total) by mouth 2 (two) times daily as needed for Cough.    diclofenac sodium (VOLTAREN) 1 % Gel Apply 2 g topically 3 (three) times daily.    fluticasone-umeclidin-vilanter (TRELEGY ELLIPTA) 200-62.5-25 mcg inhaler Inhale 1 puff into the lungs once daily.    furosemide (LASIX) 80 MG tablet Take 1 tablet (80 mg total) by mouth 2 (two) times daily On non dialysis days Tuesday-Thursday- Saturday -    HYDROcodone-acetaminophen (NORCO)  mg per tablet Take 1 tablet by mouth 3 (three) times daily as needed for Pain.    levoFLOXacin (LEVAQUIN) 500 MG tablet Take 1 tablet (500 mg total) by mouth once daily. for 7 days    LORazepam (ATIVAN) 0.5 MG tablet Take 1 tablet (0.5 mg total) by mouth every 8 (eight) hours as needed for Anxiety (use 1-2 before dialysis for anxiety, prn bedtime).    megestroL (MEGACE) 20 MG Tab Take 1 tablet (20 mg total) by mouth 2 (two) times daily.    midodrine (PROAMATINE) 10 MG tablet Take 1 tablet (10 mg total) by mouth in the morning and 1 tablet (10 mg total) in the evening and 1 tablet (10 mg total) before bedtime.    mirtazapine (REMERON) 7.5 MG Tab Take 1 tablet (7.5 mg total) by mouth every evening.    [] nitrofurantoin, macrocrystal-monohydrate, (MACROBID) 100 MG capsule Take 1 capsule (100 mg total) by mouth 2 (two) times daily. for 5 days    ondansetron 4 mg/2 mL Soln     phenazopyridine (PYRIDIUM) 100 MG tablet Take 1 tablet (100 mg total) by mouth 3 (three) times daily as needed for Pain.    predniSONE (DELTASONE) 10 MG tablet Take 1 tablet (10 mg total) by mouth once daily. for 7 days    sevelamer carbonate (RENVELA) 800 mg Tab Take 1 tablet by mouth 3 times a day    sodium chloride 7% 7 % nebulizer  solution Take 4 mLs by nebulization 2 (two) times a day.     Family History       Problem Relation (Age of Onset)    Arthritis Mother    Cancer Father    Cataracts Father, Sister    Diabetes Maternal Aunt    Glaucoma Cousin    Heart attack Maternal Grandfather    Heart disease Maternal Grandfather    Hypertension Father, Maternal Grandfather    Stroke Mother          Tobacco Use    Smoking status: Former     Types: Cigarettes    Smokeless tobacco: Former     Quit date: 2/3/2015   Substance and Sexual Activity    Alcohol use: Not Currently     Comment: Rare    Drug use: No    Sexual activity: Never     Partners: Male     Review of Systems   Constitutional:  Negative for chills, diaphoresis and fever.   HENT:  Negative for congestion and sore throat.    Eyes:  Negative for discharge and visual disturbance.   Respiratory:  Positive for cough and shortness of breath.    Cardiovascular:  Negative for chest pain and leg swelling.   Gastrointestinal:  Negative for abdominal pain, nausea and vomiting.   Genitourinary:  Negative for difficulty urinating.   Musculoskeletal:  Negative for arthralgias and joint swelling.   Skin:  Negative for rash and wound.   Allergic/Immunologic: Negative for immunocompromised state.   Neurological:  Positive for weakness. Negative for light-headedness and headaches.   Psychiatric/Behavioral:  Negative for agitation and confusion.      Objective:     Vital Signs (Most Recent):  Temp: 98 °F (36.7 °C) (09/29/23 1322)  Pulse: 99 (09/29/23 1431)  Resp: 20 (09/29/23 1322)  BP: 98/60 (09/29/23 1431)  SpO2: 96 % (09/29/23 1431) Vital Signs (24h Range):  Temp:  [98 °F (36.7 °C)] 98 °F (36.7 °C)  Pulse:  [] 99  Resp:  [20] 20  SpO2:  [95 %-96 %] 96 %  BP: (93-98)/(57-60) 98/60     Weight: 43.1 kg (95 lb)  Body mass index is 17.38 kg/m².     Physical Exam  Vitals reviewed.   Constitutional:       General: She is not in acute distress.     Appearance: She is well-developed. She is  ill-appearing. She is not diaphoretic.   HENT:      Head: Normocephalic and atraumatic.      Nose: Nose normal.   Eyes:      General: No scleral icterus.     Pupils: Pupils are equal, round, and reactive to light.   Neck:      Vascular: No JVD.      Trachea: No tracheal deviation.   Cardiovascular:      Rate and Rhythm: Regular rhythm. Tachycardia present.      Heart sounds: Normal heart sounds.   Pulmonary:      Effort: Tachypnea and respiratory distress present.      Breath sounds: Rhonchi present.      Comments: On supplemental oxygen  Abdominal:      General: There is no distension.      Palpations: Abdomen is soft.      Tenderness: There is no abdominal tenderness.   Musculoskeletal:         General: No deformity.      Cervical back: Normal range of motion.   Skin:     General: Skin is warm and dry.      Findings: No rash.   Neurological:      Mental Status: She is alert and oriented to person, place, and time.   Psychiatric:         Behavior: Behavior normal.              CRANIAL NERVES     CN III, IV, VI   Pupils are equal, round, and reactive to light.       Significant Labs: All pertinent labs within the past 24 hours have been reviewed.    Significant Imaging: I have reviewed all pertinent imaging results/findings within the past 24 hours.    Assessment/Plan:     * Acute on chronic respiratory failure with hypoxia  Patient with Hypoxic Respiratory failure which is Acute on chronic.  she is on home oxygen at 3 LPM. Supplemental oxygen was provided and noted-      .   Signs/symptoms of respiratory failure include- tachypnea, increased work of breathing and respiratory distress. Contributing diagnoses includes - COPD and Pneumonia Labs and images were reviewed. Patient Has not had a recent ABG. Will treat underlying causes and adjust management of respiratory failure as follows- IV antibiotics, dialysis, steroids    Sepsis  This patient does have evidence of infective focus  My overall impression is  sepsis.  Source: Respiratory  Antibiotics given-   Antibiotics (72h ago, onward)    Start     Stop Route Frequency Ordered    09/30/23 1500  cefTRIAXone (ROCEPHIN) 1 g in dextrose 5 % in water (D5W) 100 mL IVPB (MB+)  (CEFTRIAXONE IV/ AZITHROMYCIN PO PANEL)        See Hyperspace for full Linked Orders Report.    -- IV Every 24 hours (non-standard times) 09/29/23 1517    09/30/23 0900  azithromycin tablet 500 mg  (CEFTRIAXONE IV/ AZITHROMYCIN PO PANEL)        See Hyperspace for full Linked Orders Report.    -- Oral Daily 09/29/23 1517    09/29/23 1500  azithromycin (ZITHROMAX) 500 mg in dextrose 5 % (D5W) 250 mL IVPB (Vial-Mate)         09/30/23 0259 IV ED 1 Time 09/29/23 1446        Latest lactate reviewed-  Recent Labs   Lab 09/29/23  1505   LACTATE 1.3     Organ dysfunction indicated by Acute respiratory failure    Fluid challenge Contraindicated- Fluid bolus is contraindicated in this patient due to End Stage Renal Disease     Post- resuscitation assessment No - Post resuscitation assessment not needed       Will Not start Pressors- Levophed for MAP of 65  Source control achieved by:  IV antibiotics    Goals of care, counseling/discussion  Advance Care Planning     Code Status  In light of the patients advanced and life limiting illness,I engaged the the patient in a voluntary conversation about the patient's preferences for care  at the very end of life. The patient wishes to have a natural, peaceful death.  Along those lines, the patient does not wish to have CPR or other invasive treatments performed when her heart and/or breathing stops. I communicated to the patient that a DNR order would be placed in her medical record to reflect this preference.  I spent a total of 5 minutes engaging the patient in this advance care planning discussion.         Community acquired pneumonia of left lower lobe of lung  -initiated on IV antibiotics in the ED   -does present with leukocytosis and worsening tachypnea and  oxygen requirement; leukocytosis may be secondary to steroids that were started as outpatient but given concern for with opacification on chest x-ray will cover for community-acquired pneumonia    Benzodiazepine dependence, continuous  -likely not the best choice for this patient   -will continue p.r.n. for now social avoid withdrawal      Chronic obstructive pulmonary disease with acute exacerbation  -concern for acute exacerbation given worsening shortness of breath and increased sputum production  -initiated on COPD pathway   -prednisone and antibiotics, DuoNebs; continue home supplemental oxygen   -switch home trilogy inhaler to formulary  -needs outpatient pulmonology follow-up and would benefit from pulmonary rehab    Physical deconditioning  -significant debility   -consider PT/OT consultation tomorrow once respiratory status has improved      ESRD (end stage renal disease) on dialysis  -missed dialysis today due to respiratory disease  -nephrology consulted      Chronic diastolic heart failure  -stable   -continue home dose Lasix on non dialysis days  -dialysis      Essential hypertension  -borderline hypotension on arrival   -discontinued home antihypertensives; intermittently takes midodrine at home, usually with dialysis      Chronic pain  -chronic opioid use   -will continue home medication      VTE Risk Mitigation (From admission, onward)         Ordered     heparin (porcine) injection 5,000 Units  Every 12 hours         09/29/23 1546     IP VTE HIGH RISK PATIENT  Once         09/29/23 1517     Place sequential compression device  Until discontinued         09/29/23 1517                           Moses Hernández MD  Department of Hospital Medicine  [place] - Emergency Dept

## 2023-09-29 NOTE — ASSESSMENT & PLAN NOTE
-significant debility   -consider PT/OT consultation tomorrow once respiratory status has improved

## 2023-09-29 NOTE — TELEPHONE ENCOUNTER
Xray this am see above  Called to discuss with daughter and mother is on way to hospital from dialysis

## 2023-09-29 NOTE — ASSESSMENT & PLAN NOTE
-initiated on IV antibiotics in the ED   -does present with leukocytosis and worsening tachypnea and oxygen requirement; leukocytosis may be secondary to steroids that were started as outpatient but given concern for with opacification on chest x-ray will cover for community-acquired pneumonia

## 2023-09-29 NOTE — ED PROVIDER NOTES
Chief Complaint: worsening shortness of breath and cough with sputum production     History of Present Illness:    Katie Quevedo 78 y.o. with a  has a past medical history of Acute congestive heart failure (02/10/2020), Anemia, Bilateral renal cysts, Cataract, Chronic LBP (7/26/2012), Chronic pain, CKD (chronic kidney disease), stage IV, Colon polyp (2013), COPD (chronic obstructive pulmonary disease), Dehydration, Encounter for blood transfusion, HTN (hypertension), Lumbar spondylosis, Melanoma, Metabolic bone disease, Migraines, neuralgic, Osteoporosis, Primary osteoarthritis of both knees, Pulmonary embolism with infarction, Seizures (1972), Subdeltoid bursitis, L>R. (9/27/2012), Ulcer, and Vitamin D deficiency disease. who presents to the emergency department today with a complaint of worsening shortness of breath and cough with sputum production.  Has been ongoing for the past 3 days or so.  Has not had to increase her home O2 requirement. She has worsening cough and sputum production.  No fevers. She was not able to get her dialysis session today as her pulse was to high and her BP was too low.  Denies chest pain, palpitations.  No vomiting or diarrhea.       ROS    Constitutional: No fever, no chills.  ENT: No nasal drainage. No ear ache. No sore throat.  Gastrointestinal: No abdominal pain, no vomiting. No diarrhea.  Genitourinary: No hematuria, dysuria, urgency.  Musculoskeletal: No back pain.   Neurological: No headache, no focal weakness.    Otherwise remaining ROS negative     The history is provided by the patient      Reviewed and verified by myself:   PMH/PSH/SOC/FH REVIEWED :    Past Medical History:   Diagnosis Date    Acute congestive heart failure 02/10/2020    Anemia     Bilateral renal cysts     Cataract     Chronic LBP 7/26/2012    Chronic pain     CKD (chronic kidney disease), stage IV     Colon polyp 2013    COPD (chronic obstructive pulmonary disease)     Dehydration     Encounter for blood  transfusion     HTN (hypertension)     Lumbar spondylosis     Melanoma     of the lip    Metabolic bone disease     Migraines, neuralgic     Osteoporosis     Primary osteoarthritis of both knees     s/p Rt TKA    Pulmonary embolism with infarction     Seizures 1972    x1 only    Subdeltoid bursitis, L>R. 9/27/2012    Ulcer     Vitamin D deficiency disease        Past Surgical History:   Procedure Laterality Date    BLADDER SUSPENSION      CATARACT EXTRACTION  11/18/13    left eye    CERVICAL LAMINECTOMY      x3, fusion x1    COLONOSCOPY  2009    DECLOTTING OF ARTERIOVENOUS GRAFT Left 1/3/2022    Procedure: [id number];  Surgeon: Lindsey Louie MD;  Location: [facility] OR;  Service: Vascular;  Laterality: Left;    DECLOTTING OF ARTERIOVENOUS GRAFT Left 2/8/2022    Procedure: [id number];  Surgeon: Lindsey Louie MD;  Location: [facility] OR;  Service: Vascular;  Laterality: Left;    DECLOTTING OF ARTERIOVENOUS GRAFT Left 4/8/2022    Procedure: [id number];  Surgeon: Lindsey Louie MD;  Location: [facility] OR;  Service: Vascular;  Laterality: Left;    FISTULOGRAM N/A 2/27/2020    Procedure: Fistulogram;  Surgeon: Noe Benitez MD;  Location: [facility] CATH LAB/EP;  Service: Cardiology;  Laterality: N/A;    HYSTERECTOMY      JOINT REPLACEMENT  2001    total right knee     LUMBAR LAMINECTOMY      x 3, fusion x1    OOPHORECTOMY      PERIPHERAL ANGIOGRAPHY N/A 3/9/2020    Procedure: Peripheral angiography;  Surgeon: Jacinto Henriquez MD;  Location: [facility] CATH LAB/EP;  Service: Cardiology;  Laterality: N/A;    PLACEMENT OF ARTERIOVENOUS GRAFT Left 1/21/2020    Procedure: INSERTION, GRAFT, ARTERIOVENOUS;  Surgeon: Lindsey Louie MD;  Location: [facility] OR;  Service: General;  Laterality: Left;    PLACEMENT OF ARTERIOVENOUS GRAFT Right 7/22/2022    Procedure: INSERTION, GRAFT, ARTERIOVENOUS;  Surgeon: Lindsey Louie MD;  Location: [facility] OR;  Service: General;  Laterality: Right;    PLACEMENT OF  DUAL-LUMEN VASCULAR CATHETER Right 4/16/2022    Procedure: INSERTION-CATHETER-RICKI;  Surgeon: Lindsey Louie MD;  Location: [facility] OR;  Service: General;  Laterality: Right;    THROMBECTOMY Left 2/3/2020    Procedure: THROMBECTOMY;  Surgeon: Lindsey Louie MD;  Location: [facility] OR;  Service: General;  Laterality: Left;    THROMBECTOMY  3/9/2020    Procedure: Thrombectomy;  Surgeon: Jacinto Henriquez MD;  Location: [facility] CATH LAB/EP;  Service: Cardiology;;    THROMBECTOMY Left 4/7/2022    Procedure: THROMBECTOMY;  Surgeon: Lindsey Louie MD;  Location: [facility] OR;  Service: General;  Laterality: Left;       Social History     Socioeconomic History    Marital status:    Tobacco Use    Smoking status: Former     Types: Cigarettes    Smokeless tobacco: Former     Quit date: 2/3/2015   Substance and Sexual Activity    Alcohol use: Not Currently     Comment: Rare    Drug use: No    Sexual activity: Never     Partners: Male     Social Determinants of Health     Financial Resource Strain: Low Risk  (4/8/2022)    Overall Financial Resource Strain (CARDIA)     Difficulty of Paying Living Expenses: Not hard at all   Food Insecurity: No Food Insecurity (4/8/2022)    Hunger Vital Sign     Worried About Running Out of Food in the Last Year: Never true     Ran Out of Food in the Last Year: Never true   Transportation Needs: No Transportation Needs (4/8/2022)    PRAPARE - Transportation     Lack of Transportation (Medical): No     Lack of Transportation (Non-Medical): No   Physical Activity: Inactive (4/8/2022)    Exercise Vital Sign     Days of Exercise per Week: 0 days     Minutes of Exercise per Session: 0 min   Stress: No Stress Concern Present (4/8/2022)    [employer] - Occupational Stress Questionnaire     Feeling of Stress : Only a little   Housing Stability: Low Risk  (4/8/2022)    Housing Stability Vital Sign     Unable to Pay for Housing in the Last Year: No     Number of  Places Lived in the Last Year: 1     Unstable Housing in the Last Year: No       Family History   Problem Relation Age of Onset    Arthritis Mother     Stroke Mother     Hypertension Father     Cancer Father     Cataracts Father     Diabetes Maternal Aunt     Hypertension Maternal Grandfather     Heart disease Maternal Grandfather     Heart attack Maternal Grandfather     Cataracts Sister     Glaucoma Cousin                ALLERGIES REVIEWED  Review of patient's allergies indicates:   Allergen Reactions    Aspirin      Other reaction(s): hx of ulcers    Tetracycline Swelling     Other reaction(s): Swelling    Penicillins Rash     Other reaction(s): Hives  Other reaction(s): Rash  Other reaction(s): Rash  Other reaction(s): Hives       MEDICATIONS REVIEWED  Medication List with Changes/Refills   Current Medications    ACETAMINOPHEN (TYLENOL) 325 MG TABLET    Take 1 tablet (325 mg total) by mouth every 6 (six) hours as needed for Pain.    ALBUTEROL (PROVENTIL/VENTOLIN HFA) 90 MCG/ACTUATION INHALER    Inhale 2 puffs into the lungs every 6 (six) hours as needed for Wheezing. Rescue    ALBUTEROL-IPRATROPIUM (DUO-NEB) 2.5 MG-0.5 MG/3 ML NEBULIZER SOLUTION    Take 3 mLs by nebulization every 6 (six) hours as needed for Shortness of Breath. Rescue    ALLOPURINOL (ZYLOPRIM) 100 MG TABLET    Take 1 tablet (100 mg total) by mouth on Tuesday, Thursday, Saturday, and Sunday.    AZELASTINE (ASTELIN) 137 MCG (0.1 %) NASAL SPRAY    1 spray (137 mcg total) by Nasal route 2 (two) times daily.    BENZONATATE (TESSALON) 100 MG CAPSULE    Take 1 capsule (100 mg total) by mouth 2 (two) times daily as needed for Cough.    DICLOFENAC SODIUM (VOLTAREN) 1 % GEL    Apply 2 g topically 3 (three) times daily.    FLUTICASONE-UMECLIDIN-VILANTER (TRELEGY ELLIPTA) 200-62.5-25 MCG INHALER    Inhale 1 puff into the lungs once daily.    FUROSEMIDE (LASIX) 80 MG TABLET    Take 1 tablet (80 mg total) by mouth 2 (two) times daily On non dialysis days  Tuesday-Thursday- Saturday -Sunday    HYDROCODONE-ACETAMINOPHEN (NORCO)  MG PER TABLET    Take 1 tablet by mouth 3 (three) times daily as needed for Pain.    LEVOFLOXACIN (LEVAQUIN) 500 MG TABLET    Take 1 tablet (500 mg total) by mouth once daily. for 7 days    LORAZEPAM (ATIVAN) 0.5 MG TABLET    Take 1 tablet (0.5 mg total) by mouth every 8 (eight) hours as needed for Anxiety (use 1-2 before dialysis for anxiety, prn bedtime).    MEGESTROL (MEGACE) 20 MG TAB    Take 1 tablet (20 mg total) by mouth 2 (two) times daily.    MIDODRINE (PROAMATINE) 10 MG TABLET    Take 1 tablet (10 mg total) by mouth in the morning and 1 tablet (10 mg total) in the evening and 1 tablet (10 mg total) before bedtime.    MIRTAZAPINE (REMERON) 7.5 MG TAB    Take 1 tablet (7.5 mg total) by mouth every evening.    ONDANSETRON 4 MG/2 ML SOLN        PHENAZOPYRIDINE (PYRIDIUM) 100 MG TABLET    Take 1 tablet (100 mg total) by mouth 3 (three) times daily as needed for Pain.    PREDNISONE (DELTASONE) 10 MG TABLET    Take 1 tablet (10 mg total) by mouth once daily. for 7 days    SEVELAMER CARBONATE (RENVELA) 800 MG TAB    Take 1 tablet by mouth 3 times a day    SODIUM CHLORIDE 7% 7 % NEBULIZER SOLUTION    Take 4 mLs by nebulization 2 (two) times a day.           VS reviewed    Nursing/Ancillary staff note reviewed.       Physical Exam     ED Triage Vitals [09/29/23 1322]   BP (!) 93/57   Pulse 109   Resp 20   Temp 98 °F (36.7 °C)   SpO2 95 %       Physical Exam    Constitutional: The patient is appears to not feel well. Pale. On her home O2 requirement and appropriate.   Eyes: Pupils equal and round no pallor or injection. Extra ocular movements intact. No drainage.   ENT: Mucous membranes are moist. Oropharynx clear.   Neck: Neck is supple non-tender. No lymphadenopathy. No stridor.   Respiratory: There are no retractions, lungs are coarse at the bases, crackles.   Cardiovascular: Regular rate and rhythm. No murmurs, rubs or  gallops.  Gastrointestinal:  Abdomen is soft and non-tender, no masses, bowel sounds normal. No guarding, no rebound.  No pulsatile mass.   Neurological: Alert and oriented x 4. CN II-XII grossly intact. No focal weakness. Strength intact 5/5 bilaterally in upper and lower extremities.   Skin: Warm and dry, no rashes.  Musculoskeletal: Extremities are non-tender, non-swollen and have full range of motion.       ED Course         ED Course as of 09/29/23 1612   Fri Sep 29, 2023   1442 WBC(!): 17.83  Leukocytosis , elevated now fits SIRS criteria Will add blood cultures and lactic acid.  [JA]   1445 Troponin I(!): 0.051  Elevated  [JA]   1445 BNP(!): 143  Baseline  [JA]   1550 CMP with baseline CKD. Not hyperkalemic.  [JA]   1552 Pt has a leukocytosis, productive cough, borderline hypotension, was unable to get her dialysis today - would be in her benefit  to be admitted for further care and continued management.  [JA]      ED Course User Index  [JA] Miranda Gill MD        ED Management:            Medical Decision Making  Differential diagnosis included but not limited to : pulmonary infectious process, COPD, asthma, pulmonary embolus and congestive heart failure.      Initial: This is a 78 y.o. female  with  has a past medical history of Acute congestive heart failure (02/10/2020), Anemia, Bilateral renal cysts, Cataract, Chronic LBP (7/26/2012), Chronic pain, CKD (chronic kidney disease), stage IV, Colon polyp (2013), COPD (chronic obstructive pulmonary disease), Dehydration, Encounter for blood transfusion, HTN (hypertension), Lumbar spondylosis, Melanoma, Metabolic bone disease, Migraines, neuralgic, Osteoporosis, Primary osteoarthritis of both knees, Pulmonary embolism with infarction, Seizures (1972), Subdeltoid bursitis, L>R. (9/27/2012), Ulcer, and Vitamin D deficiency disease. who comes in for emergent evaluation of a chronic worsening problem of Shortness of breath with an acute new problem of cough  with sputum production. On examination vitals show borderline hypotension.  On physical exam notable for crackles at the base of the lungs. Has cough with sputum production. Appears to not feel well.      Concerns at this time high for infectious etiology to her presentation. 1 SIRS criteria until WBC returns.     Orders I ordered to further evaluate included:  EKG, CXR, CBC, CMP, BNP, troponin, UA    Comorbidity impacting this encounter includes: ESRD, COPD, HTN     Social determinants of health taken into consideration during development of our treatment plan include   Former Smoking/tobacco use -  Smoking was the leading social determinant of health affecting mortality and life expectancy, although income was another strong SDOH predictor, according to researchers from the [facility] at [facility] and the Department of Sociology at [facility]. JASEN Netw Open. 2022;5(4):m375030. doi:10.1001/jamanetworkopen.2022.6547    Problems Addressed:  ESRD (end stage renal disease): chronic illness or injury  Hypotension, unspecified hypotension type: complicated acute illness or injury with systemic symptoms that poses a threat to life or bodily functions  Sepsis due to pneumonia: complicated acute illness or injury with systemic symptoms that poses a threat to life or bodily functions  SOB (shortness of breath): chronic illness or injury with exacerbation, progression, or side effects of treatment    Amount and/or Complexity of Data Reviewed  External Data Reviewed: notes.     Details: Pt admitted 12/9/22 for acute on chronic resp failure   Labs: ordered. Decision-making details documented in ED Course.  Radiology: ordered and independent interpretation performed.     Details: Chest x-ray shows possibility of consolidation  ECG/medicine tests: ordered and independent interpretation performed.     Details: 108 beats per minute, sinus tachycardia, normal axis, no STEMI    Risk  Prescription drug  management.  Decision regarding hospitalization.          Pt received the following in the ED:   Medications   sodium chloride 0.9% bolus 500 mL 500 mL (500 mLs Intravenous New Bag 9/29/23 1527)   azithromycin (ZITHROMAX) 500 mg in dextrose 5 % (D5W) 250 mL IVPB (Vial-Mate) (500 mg Intravenous New Bag 9/29/23 1609)   albuterol-ipratropium 2.5 mg-0.5 mg/3 mL nebulizer solution 3 mL (has no administration in time range)   allopurinoL tablet 100 mg (has no administration in time range)   benzonatate capsule 100 mg (has no administration in time range)   diclofenac sodium 1 % gel 2 g (has no administration in time range)   fluticasone furoate-vilanteroL 100-25 mcg/dose diskus inhaler 1 puff (has no administration in time range)   tiotropium bromide 2.5 mcg/actuation inhaler 2 puff (has no administration in time range)   furosemide tablet 80 mg (has no administration in time range)   HYDROcodone-acetaminophen  mg per tablet 1 tablet (has no administration in time range)   megestroL tablet 20 mg (has no administration in time range)   mirtazapine tablet 7.5 mg (has no administration in time range)   sodium chloride 0.9% flush 3 mL (has no administration in time range)   predniSONE tablet 40 mg (40 mg Oral Given 9/29/23 1550)   cefTRIAXone (ROCEPHIN) 1 g in dextrose 5 % in water (D5W) 100 mL IVPB (MB+) (has no administration in time range)     And   azithromycin tablet 500 mg (has no administration in time range)   melatonin tablet 6 mg (has no administration in time range)   ondansetron disintegrating tablet 8 mg (has no administration in time range)   polyethylene glycol packet 17 g (has no administration in time range)   acetaminophen tablet 650 mg (has no administration in time range)   heparin (porcine) injection 5,000 Units (has no administration in time range)   cefTRIAXone (ROCEPHIN) 1 g in dextrose 5 % in water (D5W) 100 mL IVPB (MB+) (0 g Intravenous Stopped 9/29/23 1610)           MDM continued:     Katie  JODI Quevedo  presents to the emergency Department today worsening shortness of breath, cough with sputum production.  She had tachypnea and then her white blood cell count came back as elevated, positive SIRS criteria, blood cultures lactic acid added to her workup.  Chest x-ray shows possibility of consolidation versus pleural effusion.  Given her increased cough and sputum production I will treat as an infectious etiology,, sepsis due to pneumonia.  Patient has received a small amount of IV fluids given her hypotension, started small given the fact that she is end-stage renal disease and did not want to fluid overload.  She consistently maintain map over 65 and was mentating appropriately.  She received some Rocephin and azithromycin to cover for pulmonary etiology.  She does not need emergent dialysis, no peaked T-waves, no hyperkalemia.  O2 sats appropriate on her home O2 requirement.  Patient will be admitted for further care and management, admitted to Dr. Hernández.    Voice recognition software utilized in this note.      Critical Care    Date/Time: 9/29/2023 4:12 PM    Performed by: Miranda Gill MD  Authorized by: Miranda Gill MD  Total critical care time (exclusive of procedural time) : 0 minutes  Critical care time was exclusive of separately billable procedures and treating other patients.  Critical care was necessary to treat or prevent imminent or life-threatening deterioration of the following conditions: sepsis.  Critical care was time spent personally by me on the following activities: development of treatment plan with patient or surrogate, discussions with primary provider, interpretation of cardiac output measurements, evaluation of patient's response to treatment, examination of patient, obtaining history from patient or surrogate, ordering and performing treatments and interventions, ordering and review of laboratory studies, ordering and review of radiographic studies, pulse oximetry,  re-evaluation of patient's condition and review of old charts.          Impression      The primary encounter diagnosis was Hypotension, unspecified hypotension type. Diagnoses of SOB (shortness of breath), ESRD (end stage renal disease), and Sepsis due to pneumonia were also pertinent to this visit.                 Miranda Gill MD  09/29/23 8499

## 2023-09-29 NOTE — ASSESSMENT & PLAN NOTE
Advance Care Planning     Code Status  In light of the patients advanced and life limiting illness,I engaged the the patient in a voluntary conversation about the patient's preferences for care  at the very end of life. The patient wishes to have a natural, peaceful death.  Along those lines, the patient does not wish to have CPR or other invasive treatments performed when her heart and/or breathing stops. I communicated to the patient that a DNR order would be placed in her medical record to reflect this preference.  I spent a total of 5 minutes engaging the patient in this advance care planning discussion.

## 2023-09-29 NOTE — SUBJECTIVE & OBJECTIVE
Past Medical History:   Diagnosis Date    Acute congestive heart failure 02/10/2020    Anemia     Bilateral renal cysts     Cataract     Chronic LBP 7/26/2012    Chronic pain     CKD (chronic kidney disease), stage IV     Colon polyp 2013    COPD (chronic obstructive pulmonary disease)     Dehydration     Encounter for blood transfusion     HTN (hypertension)     Lumbar spondylosis     Melanoma     of the lip    Metabolic bone disease     Migraines, neuralgic     Osteoporosis     Primary osteoarthritis of both knees     s/p Rt TKA    Pulmonary embolism with infarction     Seizures 1972    x1 only    Subdeltoid bursitis, L>R. 9/27/2012    Ulcer     Vitamin D deficiency disease        Past Surgical History:   Procedure Laterality Date    BLADDER SUSPENSION      CATARACT EXTRACTION  11/18/13    left eye    CERVICAL LAMINECTOMY      x3, fusion x1    COLONOSCOPY  2009    DECLOTTING OF ARTERIOVENOUS GRAFT Left 1/3/2022    Procedure: [id number];  Surgeon: Lindsey Louie MD;  Location: [facility] OR;  Service: Vascular;  Laterality: Left;    DECLOTTING OF ARTERIOVENOUS GRAFT Left 2/8/2022    Procedure: [id number];  Surgeon: Lindsey Louie MD;  Location: [facility] OR;  Service: Vascular;  Laterality: Left;    DECLOTTING OF ARTERIOVENOUS GRAFT Left 4/8/2022    Procedure: [id number];  Surgeon: Lindsey Louie MD;  Location: [facility] OR;  Service: Vascular;  Laterality: Left;    FISTULOGRAM N/A 2/27/2020    Procedure: Fistulogram;  Surgeon: Noe Benitez MD;  Location: [facility] CATH LAB/EP;  Service: Cardiology;  Laterality: N/A;    HYSTERECTOMY      JOINT REPLACEMENT  2001    total right knee     LUMBAR LAMINECTOMY      x 3, fusion x1    OOPHORECTOMY      PERIPHERAL ANGIOGRAPHY N/A 3/9/2020    Procedure: Peripheral angiography;  Surgeon: Jacinto Henriquez MD;  Location: [facility] CATH LAB/EP;  Service: Cardiology;  Laterality: N/A;    PLACEMENT OF ARTERIOVENOUS GRAFT Left 1/21/2020    Procedure:  INSERTION, GRAFT, ARTERIOVENOUS;  Surgeon: Lindsey Louie MD;  Location: [facility] OR;  Service: General;  Laterality: Left;    PLACEMENT OF ARTERIOVENOUS GRAFT Right 7/22/2022    Procedure: INSERTION, GRAFT, ARTERIOVENOUS;  Surgeon: Lindsey Louie MD;  Location: [facility] OR;  Service: General;  Laterality: Right;    PLACEMENT OF DUAL-LUMEN VASCULAR CATHETER Right 4/16/2022    Procedure: INSERTION-CATHETER-RICKI;  Surgeon: Lindsey Louie MD;  Location: [facility] OR;  Service: General;  Laterality: Right;    THROMBECTOMY Left 2/3/2020    Procedure: THROMBECTOMY;  Surgeon: Lindsey Louie MD;  Location: [facility] OR;  Service: General;  Laterality: Left;    THROMBECTOMY  3/9/2020    Procedure: Thrombectomy;  Surgeon: Jacinto Henriquez MD;  Location: [facility] CATH LAB/EP;  Service: Cardiology;;    THROMBECTOMY Left 4/7/2022    Procedure: THROMBECTOMY;  Surgeon: Lindsey Louie MD;  Location: [facility] OR;  Service: General;  Laterality: Left;       Review of patient's allergies indicates:   Allergen Reactions    Aspirin      Other reaction(s): hx of ulcers    Tetracycline Swelling     Other reaction(s): Swelling    Penicillins Rash     Other reaction(s): Hives  Other reaction(s): Rash  Other reaction(s): Rash  Other reaction(s): Hives       No current facility-administered medications on file prior to encounter.     Current Outpatient Medications on File Prior to Encounter   Medication Sig    acetaminophen (TYLENOL) 325 MG tablet Take 1 tablet (325 mg total) by mouth every 6 (six) hours as needed for Pain.    albuterol (PROVENTIL/VENTOLIN HFA) 90 mcg/actuation inhaler Inhale 2 puffs into the lungs every 6 (six) hours as needed for Wheezing. Rescue    albuterol-ipratropium (DUO-NEB) 2.5 mg-0.5 mg/3 mL nebulizer solution Take 3 mLs by nebulization every 6 (six) hours as needed for Shortness of Breath. Rescue    allopurinoL (ZYLOPRIM) 100 MG tablet Take 1 tablet (100 mg total) by mouth on Tuesday, Thursday, Saturday,  and .    azelastine (ASTELIN) 137 mcg (0.1 %) nasal spray 1 spray (137 mcg total) by Nasal route 2 (two) times daily.    benzonatate (TESSALON) 100 MG capsule Take 1 capsule (100 mg total) by mouth 2 (two) times daily as needed for Cough.    diclofenac sodium (VOLTAREN) 1 % Gel Apply 2 g topically 3 (three) times daily.    fluticasone-umeclidin-vilanter (TRELEGY ELLIPTA) 200-62.5-25 mcg inhaler Inhale 1 puff into the lungs once daily.    furosemide (LASIX) 80 MG tablet Take 1 tablet (80 mg total) by mouth 2 (two) times daily On non dialysis days Tuesday-Thursday- Saturday -    HYDROcodone-acetaminophen (NORCO)  mg per tablet Take 1 tablet by mouth 3 (three) times daily as needed for Pain.    levoFLOXacin (LEVAQUIN) 500 MG tablet Take 1 tablet (500 mg total) by mouth once daily. for 7 days    LORazepam (ATIVAN) 0.5 MG tablet Take 1 tablet (0.5 mg total) by mouth every 8 (eight) hours as needed for Anxiety (use 1-2 before dialysis for anxiety, prn bedtime).    megestroL (MEGACE) 20 MG Tab Take 1 tablet (20 mg total) by mouth 2 (two) times daily.    midodrine (PROAMATINE) 10 MG tablet Take 1 tablet (10 mg total) by mouth in the morning and 1 tablet (10 mg total) in the evening and 1 tablet (10 mg total) before bedtime.    mirtazapine (REMERON) 7.5 MG Tab Take 1 tablet (7.5 mg total) by mouth every evening.    [] nitrofurantoin, macrocrystal-monohydrate, (MACROBID) 100 MG capsule Take 1 capsule (100 mg total) by mouth 2 (two) times daily. for 5 days    ondansetron 4 mg/2 mL Soln     phenazopyridine (PYRIDIUM) 100 MG tablet Take 1 tablet (100 mg total) by mouth 3 (three) times daily as needed for Pain.    predniSONE (DELTASONE) 10 MG tablet Take 1 tablet (10 mg total) by mouth once daily. for 7 days    sevelamer carbonate (RENVELA) 800 mg Tab Take 1 tablet by mouth 3 times a day    sodium chloride 7% 7 % nebulizer solution Take 4 mLs by nebulization 2 (two) times a day.     Family History        Problem Relation (Age of Onset)    Arthritis Mother    Cancer Father    Cataracts Father, Sister    Diabetes Maternal Aunt    Glaucoma Cousin    Heart attack Maternal Grandfather    Heart disease Maternal Grandfather    Hypertension Father, Maternal Grandfather    Stroke Mother          Tobacco Use    Smoking status: Former     Types: Cigarettes    Smokeless tobacco: Former     Quit date: 2/3/2015   Substance and Sexual Activity    Alcohol use: Not Currently     Comment: Rare    Drug use: No    Sexual activity: Never     Partners: Male     Review of Systems   Constitutional:  Negative for chills, diaphoresis and fever.   HENT:  Negative for congestion and sore throat.    Eyes:  Negative for discharge and visual disturbance.   Respiratory:  Positive for cough and shortness of breath.    Cardiovascular:  Negative for chest pain and leg swelling.   Gastrointestinal:  Negative for abdominal pain, nausea and vomiting.   Genitourinary:  Negative for difficulty urinating.   Musculoskeletal:  Negative for arthralgias and joint swelling.   Skin:  Negative for rash and wound.   Allergic/Immunologic: Negative for immunocompromised state.   Neurological:  Positive for weakness. Negative for light-headedness and headaches.   Psychiatric/Behavioral:  Negative for agitation and confusion.      Objective:     Vital Signs (Most Recent):  Temp: 98 °F (36.7 °C) (09/29/23 1322)  Pulse: 99 (09/29/23 1431)  Resp: 20 (09/29/23 1322)  BP: 98/60 (09/29/23 1431)  SpO2: 96 % (09/29/23 1431) Vital Signs (24h Range):  Temp:  [98 °F (36.7 °C)] 98 °F (36.7 °C)  Pulse:  [] 99  Resp:  [20] 20  SpO2:  [95 %-96 %] 96 %  BP: (93-98)/(57-60) 98/60     Weight: 43.1 kg (95 lb)  Body mass index is 17.38 kg/m².     Physical Exam  Vitals reviewed.   Constitutional:       General: She is not in acute distress.     Appearance: She is well-developed. She is ill-appearing. She is not diaphoretic.   HENT:      Head: Normocephalic and atraumatic.      Nose:  Nose normal.   Eyes:      General: No scleral icterus.     Pupils: Pupils are equal, round, and reactive to light.   Neck:      Vascular: No JVD.      Trachea: No tracheal deviation.   Cardiovascular:      Rate and Rhythm: Regular rhythm. Tachycardia present.      Heart sounds: Normal heart sounds.   Pulmonary:      Effort: Tachypnea and respiratory distress present.      Breath sounds: Rhonchi present.      Comments: On supplemental oxygen  Abdominal:      General: There is no distension.      Palpations: Abdomen is soft.      Tenderness: There is no abdominal tenderness.   Musculoskeletal:         General: No deformity.      Cervical back: Normal range of motion.   Skin:     General: Skin is warm and dry.      Findings: No rash.   Neurological:      Mental Status: She is alert and oriented to person, place, and time.   Psychiatric:         Behavior: Behavior normal.              CRANIAL NERVES     CN III, IV, VI   Pupils are equal, round, and reactive to light.       Significant Labs: All pertinent labs within the past 24 hours have been reviewed.    Significant Imaging: I have reviewed all pertinent imaging results/findings within the past 24 hours.

## 2023-09-29 NOTE — ED NOTES
ER MD states do not need to cath patient for urine sample at this time. Pt is dialysis patient, can await for patient to urinate.

## 2023-09-29 NOTE — ASSESSMENT & PLAN NOTE
-borderline hypotension on arrival   -discontinued home antihypertensives; intermittently takes midodrine at home, usually with dialysis

## 2023-09-30 PROBLEM — E87.5 HYPERKALEMIA: Status: ACTIVE | Noted: 2023-09-30

## 2023-09-30 LAB
ANION GAP SERPL CALC-SCNC: 15 MMOL/L (ref 8–16)
BASOPHILS # BLD AUTO: 0.04 K/UL (ref 0–0.2)
BASOPHILS NFR BLD: 0.3 % (ref 0–1.9)
BUN SERPL-MCNC: 57 MG/DL (ref 8–23)
CALCIUM SERPL-MCNC: 7.9 MG/DL (ref 8.7–10.5)
CHLORIDE SERPL-SCNC: 102 MMOL/L (ref 95–110)
CO2 SERPL-SCNC: 20 MMOL/L (ref 23–29)
CREAT SERPL-MCNC: 7 MG/DL (ref 0.5–1.4)
DIFFERENTIAL METHOD: ABNORMAL
EOSINOPHIL # BLD AUTO: 0 K/UL (ref 0–0.5)
EOSINOPHIL NFR BLD: 0 % (ref 0–8)
ERYTHROCYTE [DISTWIDTH] IN BLOOD BY AUTOMATED COUNT: 14.5 % (ref 11.5–14.5)
EST. GFR  (NO RACE VARIABLE): 6 ML/MIN/1.73 M^2
GLUCOSE SERPL-MCNC: 121 MG/DL (ref 70–110)
HBV CORE AB SERPL QL IA: NORMAL
HBV SURFACE AG SERPL QL IA: NORMAL
HCT VFR BLD AUTO: 36.1 % (ref 37–48.5)
HGB BLD-MCNC: 11.3 G/DL (ref 12–16)
IMM GRANULOCYTES # BLD AUTO: 0.34 K/UL (ref 0–0.04)
IMM GRANULOCYTES NFR BLD AUTO: 2.5 % (ref 0–0.5)
LYMPHOCYTES # BLD AUTO: 1.1 K/UL (ref 1–4.8)
LYMPHOCYTES NFR BLD: 7.9 % (ref 18–48)
MAGNESIUM SERPL-MCNC: 2.4 MG/DL (ref 1.6–2.6)
MCH RBC QN AUTO: 32.4 PG (ref 27–31)
MCHC RBC AUTO-ENTMCNC: 31.3 G/DL (ref 32–36)
MCV RBC AUTO: 103 FL (ref 82–98)
MONOCYTES # BLD AUTO: 0.3 K/UL (ref 0.3–1)
MONOCYTES NFR BLD: 2.3 % (ref 4–15)
NEUTROPHILS # BLD AUTO: 11.9 K/UL (ref 1.8–7.7)
NEUTROPHILS NFR BLD: 87 % (ref 38–73)
NRBC BLD-RTO: 0 /100 WBC
PHOSPHATE SERPL-MCNC: 5.8 MG/DL (ref 2.7–4.5)
PLATELET # BLD AUTO: 228 K/UL (ref 150–450)
PMV BLD AUTO: 10.3 FL (ref 9.2–12.9)
POTASSIUM SERPL-SCNC: 6.1 MMOL/L (ref 3.5–5.1)
PROCALCITONIN SERPL IA-MCNC: 0.73 NG/ML
RBC # BLD AUTO: 3.49 M/UL (ref 4–5.4)
SODIUM SERPL-SCNC: 137 MMOL/L (ref 136–145)
WBC # BLD AUTO: 13.61 K/UL (ref 3.9–12.7)

## 2023-09-30 PROCEDURE — 25000003 PHARM REV CODE 250: Mod: HCNC | Performed by: HOSPITALIST

## 2023-09-30 PROCEDURE — 84145 PROCALCITONIN (PCT): CPT | Mod: HCNC | Performed by: HOSPITALIST

## 2023-09-30 PROCEDURE — 25000003 PHARM REV CODE 250: Mod: HCNC | Performed by: NURSE PRACTITIONER

## 2023-09-30 PROCEDURE — 83735 ASSAY OF MAGNESIUM: CPT | Mod: HCNC | Performed by: HOSPITALIST

## 2023-09-30 PROCEDURE — 87340 HEPATITIS B SURFACE AG IA: CPT | Mod: HCNC | Performed by: STUDENT IN AN ORGANIZED HEALTH CARE EDUCATION/TRAINING PROGRAM

## 2023-09-30 PROCEDURE — 85025 COMPLETE CBC W/AUTO DIFF WBC: CPT | Mod: HCNC | Performed by: HOSPITALIST

## 2023-09-30 PROCEDURE — 94761 N-INVAS EAR/PLS OXIMETRY MLT: CPT | Mod: HCNC

## 2023-09-30 PROCEDURE — 90935 HEMODIALYSIS ONE EVALUATION: CPT | Mod: HCNC

## 2023-09-30 PROCEDURE — 86706 HEP B SURFACE ANTIBODY: CPT | Mod: HCNC | Performed by: STUDENT IN AN ORGANIZED HEALTH CARE EDUCATION/TRAINING PROGRAM

## 2023-09-30 PROCEDURE — 25000242 PHARM REV CODE 250 ALT 637 W/ HCPCS: Mod: HCNC | Performed by: HOSPITALIST

## 2023-09-30 PROCEDURE — 80048 BASIC METABOLIC PNL TOTAL CA: CPT | Mod: HCNC | Performed by: HOSPITALIST

## 2023-09-30 PROCEDURE — 86704 HEP B CORE ANTIBODY TOTAL: CPT | Mod: HCNC | Performed by: STUDENT IN AN ORGANIZED HEALTH CARE EDUCATION/TRAINING PROGRAM

## 2023-09-30 PROCEDURE — 63600175 PHARM REV CODE 636 W HCPCS: Mod: HCNC | Performed by: HOSPITALIST

## 2023-09-30 PROCEDURE — 25000003 PHARM REV CODE 250: Mod: HCNC | Performed by: STUDENT IN AN ORGANIZED HEALTH CARE EDUCATION/TRAINING PROGRAM

## 2023-09-30 PROCEDURE — 94640 AIRWAY INHALATION TREATMENT: CPT | Mod: HCNC

## 2023-09-30 PROCEDURE — 27000221 HC OXYGEN, UP TO 24 HOURS: Mod: HCNC

## 2023-09-30 PROCEDURE — 84100 ASSAY OF PHOSPHORUS: CPT | Mod: HCNC | Performed by: HOSPITALIST

## 2023-09-30 PROCEDURE — 11000001 HC ACUTE MED/SURG PRIVATE ROOM: Mod: HCNC

## 2023-09-30 PROCEDURE — 99900035 HC TECH TIME PER 15 MIN (STAT): Mod: HCNC

## 2023-09-30 PROCEDURE — 63700000 PHARM REV CODE 250 ALT 637 W/O HCPCS: Mod: HCNC | Performed by: HOSPITALIST

## 2023-09-30 RX ORDER — SODIUM CHLORIDE 9 MG/ML
INJECTION, SOLUTION INTRAVENOUS
Status: DISCONTINUED | OUTPATIENT
Start: 2023-09-30 | End: 2023-10-01 | Stop reason: HOSPADM

## 2023-09-30 RX ORDER — SODIUM CHLORIDE 9 MG/ML
INJECTION, SOLUTION INTRAVENOUS ONCE
Status: COMPLETED | OUTPATIENT
Start: 2023-09-30 | End: 2023-09-30

## 2023-09-30 RX ORDER — HEPARIN SODIUM 1000 [USP'U]/ML
4000 INJECTION, SOLUTION INTRAVENOUS; SUBCUTANEOUS
Status: DISCONTINUED | OUTPATIENT
Start: 2023-09-30 | End: 2023-10-01 | Stop reason: HOSPADM

## 2023-09-30 RX ORDER — MIDODRINE HYDROCHLORIDE 5 MG/1
5 TABLET ORAL
Status: COMPLETED | OUTPATIENT
Start: 2023-09-30 | End: 2023-09-30

## 2023-09-30 RX ADMIN — MUPIROCIN: 20 OINTMENT TOPICAL at 10:09

## 2023-09-30 RX ADMIN — HEPARIN SODIUM 5000 UNITS: 5000 INJECTION INTRAVENOUS; SUBCUTANEOUS at 08:09

## 2023-09-30 RX ADMIN — TIOTROPIUM BROMIDE INHALATION SPRAY 2 PUFF: 3.12 SPRAY, METERED RESPIRATORY (INHALATION) at 09:09

## 2023-09-30 RX ADMIN — IPRATROPIUM BROMIDE AND ALBUTEROL SULFATE 3 ML: 2.5; .5 SOLUTION RESPIRATORY (INHALATION) at 10:09

## 2023-09-30 RX ADMIN — HYDROCODONE BITARTRATE AND ACETAMINOPHEN 1 TABLET: 10; 325 TABLET ORAL at 05:09

## 2023-09-30 RX ADMIN — CEFTRIAXONE 1 G: 1 INJECTION, POWDER, FOR SOLUTION INTRAMUSCULAR; INTRAVENOUS at 04:09

## 2023-09-30 RX ADMIN — Medication 6 MG: at 12:09

## 2023-09-30 RX ADMIN — MIDODRINE HYDROCHLORIDE 5 MG: 5 TABLET ORAL at 12:09

## 2023-09-30 RX ADMIN — ALLOPURINOL 100 MG: 100 TABLET ORAL at 10:09

## 2023-09-30 RX ADMIN — PREDNISONE 40 MG: 20 TABLET ORAL at 10:09

## 2023-09-30 RX ADMIN — MEGESTROL ACETATE 20 MG: 20 TABLET ORAL at 08:09

## 2023-09-30 RX ADMIN — FLUTICASONE FUROATE AND VILANTEROL TRIFENATATE 1 PUFF: 100; 25 POWDER RESPIRATORY (INHALATION) at 09:09

## 2023-09-30 RX ADMIN — LORAZEPAM 0.5 MG: 0.5 TABLET ORAL at 11:09

## 2023-09-30 RX ADMIN — MEGESTROL ACETATE 20 MG: 20 TABLET ORAL at 10:09

## 2023-09-30 RX ADMIN — SODIUM CHLORIDE 100 ML/HR: 9 INJECTION, SOLUTION INTRAVENOUS at 12:09

## 2023-09-30 RX ADMIN — HEPARIN SODIUM 5000 UNITS: 5000 INJECTION INTRAVENOUS; SUBCUTANEOUS at 10:09

## 2023-09-30 RX ADMIN — AZITHROMYCIN 500 MG: 250 TABLET, FILM COATED ORAL at 10:09

## 2023-09-30 RX ADMIN — FUROSEMIDE 80 MG: 40 TABLET ORAL at 10:09

## 2023-09-30 RX ADMIN — IPRATROPIUM BROMIDE AND ALBUTEROL SULFATE 3 ML: 2.5; .5 SOLUTION RESPIRATORY (INHALATION) at 03:09

## 2023-09-30 RX ADMIN — MUPIROCIN: 20 OINTMENT TOPICAL at 08:09

## 2023-09-30 RX ADMIN — SODIUM CHLORIDE 250 ML: 9 INJECTION, SOLUTION INTRAVENOUS at 08:09

## 2023-09-30 NOTE — PT/OT/SLP PROGRESS
Physical Therapy      Patient Name:  Katie Quevedo   MRN:  [id number]    Patient not seen today secondary to Dialysis. Will follow-up Sunday for PT evaluation.

## 2023-09-30 NOTE — SUBJECTIVE & OBJECTIVE
Interval History:  More comfortable today, back to home oxygen requirements.  Reports that her breathing feels a little better today.  Will continue IV antibiotics for now; discussed with Nephrology will undergo dialysis today.    Review of Systems  Objective:     Vital Signs (Most Recent):  Temp: 97.5 °F (36.4 °C) (09/30/23 1116)  Pulse: 89 (09/30/23 1134)  Resp: 18 (09/30/23 1116)  BP: 128/61 (09/30/23 0738)  SpO2: 98 % (09/30/23 1116) Vital Signs (24h Range):  Temp:  [96 °F (35.6 °C)-98 °F (36.7 °C)] 97.5 °F (36.4 °C)  Pulse:  [] 89  Resp:  [16-32] 18  SpO2:  [95 %-100 %] 98 %  BP: ()/(53-83) 128/61     Weight: 45.3 kg (99 lb 13.9 oz)  Body mass index is 18.27 kg/m².  No intake or output data in the 24 hours ending 09/30/23 1142      Physical Exam  Vitals reviewed.   Constitutional:       General: She is not in acute distress.     Appearance: She is well-developed. She is ill-appearing. She is not diaphoretic.   HENT:      Head: Normocephalic and atraumatic.      Nose: Nose normal.   Eyes:      General: No scleral icterus.     Pupils: Pupils are equal, round, and reactive to light.   Neck:      Vascular: No JVD.      Trachea: No tracheal deviation.   Cardiovascular:      Rate and Rhythm: Normal rate and regular rhythm.      Heart sounds: Normal heart sounds.   Pulmonary:      Effort: No tachypnea or respiratory distress.      Breath sounds: Rhonchi present.      Comments: On supplemental oxygen  Abdominal:      General: There is no distension.      Palpations: Abdomen is soft.      Tenderness: There is no abdominal tenderness.   Musculoskeletal:         General: No deformity.      Cervical back: Normal range of motion.   Skin:     General: Skin is warm and dry.      Findings: No rash.   Neurological:      Mental Status: She is alert and oriented to person, place, and time.   Psychiatric:         Behavior: Behavior normal.             Significant Labs: All pertinent labs within the past 24 hours have  been reviewed.    Significant Imaging: I have reviewed all pertinent imaging results/findings within the past 24 hours.

## 2023-09-30 NOTE — CONSULTS
Nephrology Consult  H&P      Consult Requested By: Moses Hernández,*  Reason for Consult: ESRD     SUBJECTIVE:     History of Present Illness:  Katie Quevedo is a 78 y.o.   female who  has a past medical history of  ESRD on HD via [facility] with Dr Aura Diggs, Acute congestive heart failure (02/10/2020), Anemia, Bilateral renal cysts, Cataract, Chronic LBP (7/26/2012), Chronic pain, CKD (chronic kidney disease), stage IV, Colon polyp (2013), COPD (chronic obstructive pulmonary disease), Dehydration, Encounter for blood transfusion, HTN (hypertension), Lumbar spondylosis, Melanoma, Metabolic bone disease, Migraines, neuralgic, Osteoporosis, Primary osteoarthritis of both knees, Pulmonary embolism with infarction, Seizures (1972), Subdeltoid bursitis, L>R. (9/27/2012), Ulcer, and Vitamin D deficiency disease.. The patient presented to the [facility] on 9/29/2023 with a primary complaint of SOB  cough  missed HD Friday       ?    Review of Systems   Constitutional:  Positive for malaise/fatigue. Negative for chills and fever.   HENT:  Negative for congestion and sore throat.    Eyes:  Negative for blurred vision, double vision and photophobia.   Respiratory:  Positive for cough and shortness of breath.    Cardiovascular:  Negative for chest pain, palpitations and leg swelling.   Gastrointestinal:  Negative for abdominal pain, diarrhea, nausea and vomiting.   Genitourinary:  Negative for dysuria and urgency.   Musculoskeletal:  Negative for joint pain and myalgias.   Skin:  Negative for itching and rash.   Neurological:  Negative for dizziness, sensory change, weakness and headaches.   Endo/Heme/Allergies:  Negative for polydipsia. Does not bruise/bleed easily.   Psychiatric/Behavioral:  Negative for depression.        Past Medical History:   Diagnosis Date    Acute congestive heart failure 02/10/2020    Anemia     Bilateral renal cysts     Cataract     Chronic LBP 7/26/2012    Chronic pain     CKD  (chronic kidney disease), stage IV     Colon polyp 2013    COPD (chronic obstructive pulmonary disease)     Dehydration     Encounter for blood transfusion     HTN (hypertension)     Lumbar spondylosis     Melanoma     of the lip    Metabolic bone disease     Migraines, neuralgic     Osteoporosis     Primary osteoarthritis of both knees     s/p Rt TKA    Pulmonary embolism with infarction     Seizures 1972    x1 only    Subdeltoid bursitis, L>R. 9/27/2012    Ulcer     Vitamin D deficiency disease      Past Surgical History:   Procedure Laterality Date    BLADDER SUSPENSION      CATARACT EXTRACTION  11/18/13    left eye    CERVICAL LAMINECTOMY      x3, fusion x1    COLONOSCOPY  2009    DECLOTTING OF ARTERIOVENOUS GRAFT Left 1/3/2022    Procedure: [id number];  Surgeon: Lindsey Louie MD;  Location: [facility] OR;  Service: Vascular;  Laterality: Left;    DECLOTTING OF ARTERIOVENOUS GRAFT Left 2/8/2022    Procedure: [id number];  Surgeon: Lindsey Louie MD;  Location: [facility] OR;  Service: Vascular;  Laterality: Left;    DECLOTTING OF ARTERIOVENOUS GRAFT Left 4/8/2022    Procedure: [id number]-ST;  Surgeon: Lindsey Louie MD;  Location: [facility] OR;  Service: Vascular;  Laterality: Left;    FISTULOGRAM N/A 2/27/2020    Procedure: Fistulogram;  Surgeon: Noe Benitez MD;  Location: [facility] CATH LAB/EP;  Service: Cardiology;  Laterality: N/A;    HYSTERECTOMY      JOINT REPLACEMENT  2001    total right knee     LUMBAR LAMINECTOMY      x 3, fusion x1    OOPHORECTOMY      PERIPHERAL ANGIOGRAPHY N/A 3/9/2020    Procedure: Peripheral angiography;  Surgeon: Jacinto Henriquez MD;  Location: [facility] CATH LAB/EP;  Service: Cardiology;  Laterality: N/A;    PLACEMENT OF ARTERIOVENOUS GRAFT Left 1/21/2020    Procedure: INSERTION, GRAFT, ARTERIOVENOUS;  Surgeon: Lindsey Louie MD;  Location: [facility] OR;  Service: General;  Laterality: Left;    PLACEMENT OF ARTERIOVENOUS GRAFT Right 7/22/2022    Procedure:  INSERTION, GRAFT, ARTERIOVENOUS;  Surgeon: Lindsey Louie MD;  Location: [facility] OR;  Service: General;  Laterality: Right;    PLACEMENT OF DUAL-LUMEN VASCULAR CATHETER Right 4/16/2022    Procedure: INSERTION-CATHETER-RICKI;  Surgeon: Lindsey Louie MD;  Location: [facility] OR;  Service: General;  Laterality: Right;    THROMBECTOMY Left 2/3/2020    Procedure: THROMBECTOMY;  Surgeon: Lindsey Louie MD;  Location: [facility] OR;  Service: General;  Laterality: Left;    THROMBECTOMY  3/9/2020    Procedure: Thrombectomy;  Surgeon: Jacinto Henriquez MD;  Location: [facility] CATH LAB/EP;  Service: Cardiology;;    THROMBECTOMY Left 4/7/2022    Procedure: THROMBECTOMY;  Surgeon: Lindsey Louie MD;  Location: [facility] OR;  Service: General;  Laterality: Left;     Family History   Problem Relation Age of Onset    Arthritis Mother     Stroke Mother     Hypertension Father     Cancer Father     Cataracts Father     Diabetes Maternal Aunt     Hypertension Maternal Grandfather     Heart disease Maternal Grandfather     Heart attack Maternal Grandfather     Cataracts Sister     Glaucoma Cousin      Social History     Tobacco Use    Smoking status: Former     Types: Cigarettes    Smokeless tobacco: Former     Quit date: 2/3/2015   Substance Use Topics    Alcohol use: Not Currently     Comment: Rare    Drug use: No       Review of patient's allergies indicates:   Allergen Reactions    Aspirin      Other reaction(s): hx of ulcers    Tetracycline Swelling     Other reaction(s): Swelling    Penicillins Rash     Other reaction(s): Hives  Other reaction(s): Rash  Other reaction(s): Rash  Other reaction(s): Hives            OBJECTIVE:     Vital Signs (Most Recent)  Vitals:    09/30/23 1218 09/30/23 1230 09/30/23 1300 09/30/23 1330   BP: (!) 91/57 112/64 114/67 116/74   BP Location:       Patient Position:       Pulse: 97 86 80 95   Resp:       Temp:       TempSrc:       SpO2:       Weight:       Height:                      Medications:          Physical Exam  Vitals and nursing note reviewed.   Constitutional:       General: She is not in acute distress.     Appearance: She is ill-appearing. She is not diaphoretic.   HENT:      Head: Normocephalic and atraumatic.      Mouth/Throat:      Pharynx: No oropharyngeal exudate.   Eyes:      General: No scleral icterus.     Conjunctiva/sclera: Conjunctivae normal.      Pupils: Pupils are equal, round, and reactive to light.   Cardiovascular:      Rate and Rhythm: Normal rate and regular rhythm.      Heart sounds: Normal heart sounds. No murmur heard.  Pulmonary:      Effort: Pulmonary effort is normal. No respiratory distress.      Breath sounds: Rales present. No wheezing.   Abdominal:      General: Bowel sounds are normal. There is no distension.      Palpations: Abdomen is soft.      Tenderness: There is no abdominal tenderness.   Musculoskeletal:         General: Normal range of motion.      Cervical back: Normal range of motion and neck supple.      Right lower leg: No edema.      Left lower leg: No edema.      Comments:    CVCRIJ    Skin:     General: Skin is warm and dry.      Findings: No erythema.   Neurological:      Mental Status: She is alert and oriented to person, place, and time.      Cranial Nerves: No cranial nerve deficit.   Psychiatric:         Mood and Affect: Affect normal.         Cognition and Memory: Memory normal.         Judgment: Judgment normal.         Laboratory:  Recent Labs   Lab 09/29/23  1330 09/30/23  0329   WBC 17.83* 13.61*   HGB 12.5 11.3*   HCT 39.5 36.1*    228   MONO 3.6*  0.6 2.3*  0.3   EOSINOPHIL 0.3 0.0     Recent Labs   Lab 09/29/23  1330 09/30/23  0329    137   K 5.1 6.1*    102   CO2 19* 20*   BUN 44* 57*   CREATININE 6.9* 7.0*   CALCIUM 8.8 7.9*   PHOS  --  5.8*       Diagnostic Results:  X-Ray: Reviewed  US: Reviewed  Echo: Reviewed  ASSESSMENT/PLAN:     1. ESRD  - HD [facility] With Dr Aura Diggs   --  Last HD Wednesday  now SOB hypoxia PNA  HD with [facility] today   Give midodrine 5 before HD   -- Keep MWF   -- Daily Renal Function Panel  -- Avoid Hypotension.  -- Renally dose all meds  2. HTN (I10) -  Controlled   3. Anemia of chronic kidney disease treated with BILL (N18.9 D63.1) -   EPogen  with   HD as needed    Recent Labs   Lab 09/29/23  1330 09/30/23  0329   HGB 12.5 11.3*   HCT 39.5 36.1*    228       Iron   Lab Results   Component Value Date    IRON 13 (L) 02/12/2020    TIBC 462 (H) 02/12/2020    FERRITIN 148 07/17/2019       4. MBD (E88.9 M90.80) -  Recent Labs   Lab 09/30/23  0329   CALCIUM 7.9*   PHOS 5.8*     Recent Labs   Lab 09/29/23  1330 09/30/23  0329   MG 2.5 2.4       Lab Results   Component Value Date    .0 (H) 12/28/2018    CALCIUM 7.9 (L) 09/30/2023    PHOS 5.8 (H) 09/30/2023     Lab Results   Component Value Date    JSJILMLQ35MP 26 (L) 02/12/2020     Sevelamer   Lab Results   Component Value Date    CO2 20 (L) 09/30/2023       5. Nutrition/Hypoalbuminemia (E88.09) -   Recent Labs   Lab 09/29/23  1330   ALBUMIN 3.3*     Nepro with meals TID. Renal vitamins daily      Thank you for the consult, will follow  With any question please call [phone]  Ramo Da Silva MD    [facility]  EDGARD Bustillos MD, FACP,   NEIL Diggs MD,   MD CURTIS Benoit MD E. V. Harmon, NP    [address]  [place], [address] [phone]

## 2023-09-30 NOTE — ASSESSMENT & PLAN NOTE
-likely not the best choice for this patient   -will continue p.r.n. for now to avoid withdrawal

## 2023-09-30 NOTE — PROGRESS NOTES
09/30/23 1552   Vital Signs   Temp 97.1 °F (36.2 °C)   Temp Source Temporal   Pulse 85   Heart Rate Source Monitor   Resp 20   SpO2 100 %   Pulse Oximetry Type Intermittent   Flow (L/min) 2.5   Device (Oxygen Therapy) nasal cannula   /74   BP Location Right arm   BP Method Automatic   Patient Position Lying   Post-Hemodialysis Assessment   Rinseback Volume (mL) 250 mL   Blood Volume Processed (Liters) 69.2 L   Dialyzer Clearance Moderately streaked   Duration of Treatment 210 minutes   Additional Fluid Intake (mL) 500 mL   Total UF (mL) 1500 mL   Net Fluid Removal 1000   Patient Response to Treatment tolerated well   Post-Hemodialysis Comments Lines dc'd CVC flushed and locked per P and P. VSS. Breathing tx completed. Moderate relief of symptoms reported. Dressing changed. No s/s of infection @ site. No other complaints.

## 2023-09-30 NOTE — NURSING
Patient back to unit from dialysis. Vital signs taken. BP 97/54, HR 100bpm.   Patient denies of light-headedness, dizziness, or nausea. Tele box on. O2 at 2.5L via NC. No complaints of pain or distress at this time. Will retake BP in 15 minutes.

## 2023-09-30 NOTE — PT/OT/SLP PROGRESS
Occupational Therapy  Missed Visit    Patient Name:  Katie Quevedo   MRN:  [id number]    Patient not seen today secondary to Dialysis. Will follow-up Sunday.    9/30/2023

## 2023-09-30 NOTE — PROGRESS NOTES
[facility] Medicine  Progress Note    Patient Name: Katie Quevedo  MRN: [id number]  Patient Class: IP- Inpatient   Admission Date: 9/29/2023  Length of Stay: 1 days  Attending Physician: Moses Hernández,*  Primary Care Provider: Kingston Verduzco MD        Subjective:     Principal Problem:Acute on chronic respiratory failure with hypoxia        HPI:  Ms. Quevedo is an 77yo woman with ESRD on dialysis, hypertension, diastolic heart failure, and COPD on home oxygen who presents with worsening shortness of breath.  She states that over the past month or so she has been gradually feeling some worsening of her respiratory status but over the past 3 days it has acutely gotten much worse.  She is had a cough during this time that has been productive of sputum.  Reports having some shortness of breath at rest, which is worse than baseline.  Normally she is able to walk through room by gripping onto furniture; unable to cross the room without having to take a rest due to her breathing.  Denies any chest pain, palpitations, fevers, or chills.  She has been compliant with her dialysis; last dialysis session was on Wednesday.  When she went to dialysis today, she was told that she was breathing too fast and had to fast heart rate to safely undergo hemodialysis.  Of note, she had spoken with her primary care physician the day previously; they called in an antibiotic and steroids.  She took a dose of steroid but had not yet picked up the antibiotic prescription.      Overview/Hospital Course:  No notes on file    Interval History:  More comfortable today, back to home oxygen requirements.  Reports that her breathing feels a little better today.  Will continue IV antibiotics for now; discussed with Nephrology will undergo dialysis today.    Review of Systems  Objective:     Vital Signs (Most Recent):  Temp: 97.5 °F (36.4 °C) (09/30/23 1116)  Pulse: 89 (09/30/23 1134)  Resp: 18 (09/30/23 1116)  BP: 128/61  (09/30/23 0738)  SpO2: 98 % (09/30/23 1116) Vital Signs (24h Range):  Temp:  [96 °F (35.6 °C)-98 °F (36.7 °C)] 97.5 °F (36.4 °C)  Pulse:  [] 89  Resp:  [16-32] 18  SpO2:  [95 %-100 %] 98 %  BP: ()/(53-83) 128/61     Weight: 45.3 kg (99 lb 13.9 oz)  Body mass index is 18.27 kg/m².  No intake or output data in the 24 hours ending 09/30/23 1142      Physical Exam  Vitals reviewed.   Constitutional:       General: She is not in acute distress.     Appearance: She is well-developed. She is ill-appearing. She is not diaphoretic.   HENT:      Head: Normocephalic and atraumatic.      Nose: Nose normal.   Eyes:      General: No scleral icterus.     Pupils: Pupils are equal, round, and reactive to light.   Neck:      Vascular: No JVD.      Trachea: No tracheal deviation.   Cardiovascular:      Rate and Rhythm: Normal rate and regular rhythm.      Heart sounds: Normal heart sounds.   Pulmonary:      Effort: No tachypnea or respiratory distress.      Breath sounds: Rhonchi present.      Comments: On supplemental oxygen  Abdominal:      General: There is no distension.      Palpations: Abdomen is soft.      Tenderness: There is no abdominal tenderness.   Musculoskeletal:         General: No deformity.      Cervical back: Normal range of motion.   Skin:     General: Skin is warm and dry.      Findings: No rash.   Neurological:      Mental Status: She is alert and oriented to person, place, and time.   Psychiatric:         Behavior: Behavior normal.             Significant Labs: All pertinent labs within the past 24 hours have been reviewed.    Significant Imaging: I have reviewed all pertinent imaging results/findings within the past 24 hours.      Assessment/Plan:      * Acute on chronic respiratory failure with hypoxia  Patient with Hypoxic Respiratory failure which is Acute on chronic.  she is on home oxygen at 3 LPM. Supplemental oxygen was provided and noted-      .   Signs/symptoms of respiratory failure  include- tachypnea, increased work of breathing and respiratory distress. Contributing diagnoses includes - COPD and Pneumonia Labs and images were reviewed. Patient Has not had a recent ABG. Will treat underlying causes and adjust management of respiratory failure as follows- IV antibiotics, dialysis, steroids    Hyperkalemia  -due to ESRD   -dialysis today      Sepsis  This patient does have evidence of infective focus  My overall impression is sepsis.  Source: Respiratory  Antibiotics given-   Antibiotics (72h ago, onward)    Start     Stop Route Frequency Ordered    09/30/23 1500  cefTRIAXone (ROCEPHIN) 1 g in dextrose 5 % in water (D5W) 100 mL IVPB (MB+)  (CEFTRIAXONE IV/ AZITHROMYCIN PO PANEL)        See Hyperspace for full Linked Orders Report.    -- IV Every 24 hours (non-standard times) 09/29/23 1517    09/30/23 0900  azithromycin tablet 500 mg  (CEFTRIAXONE IV/ AZITHROMYCIN PO PANEL)        See Hyperspace for full Linked Orders Report.    -- Oral Daily 09/29/23 1517    09/29/23 2100  mupirocin 2 % ointment         10/04/23 2059 Nasl 2 times daily 09/29/23 1948        Latest lactate reviewed-  Recent Labs   Lab 09/29/23  1505   LACTATE 1.3     Organ dysfunction indicated by Acute respiratory failure    Fluid challenge Contraindicated- Fluid bolus is contraindicated in this patient due to End Stage Renal Disease     Post- resuscitation assessment No - Post resuscitation assessment not needed       Will Not start Pressors- Levophed for MAP of 65  Source control achieved by:  IV antibiotics  -sepsis physiology improving today    Goals of care, counseling/discussion  Advance Care Planning     Code Status  In light of the patients advanced and life limiting illness,I engaged the the patient in a voluntary conversation about the patient's preferences for care  at the very end of life. The patient wishes to have a natural, peaceful death.  Along those lines, the patient does not wish to have CPR or other invasive  treatments performed when her heart and/or breathing stops. I communicated to the patient that a DNR order would be placed in her medical record to reflect this preference.  I spent a total of 5 minutes engaging the patient in this advance care planning discussion.         Community acquired pneumonia of left lower lobe of lung  -initiated on IV antibiotics in the ED   -does present with leukocytosis and worsening tachypnea and oxygen requirement; leukocytosis may be secondary to steroids that were started as outpatient but given concern for with opacification on chest x-ray will cover for community-acquired pneumonia    Benzodiazepine dependence, continuous  -likely not the best choice for this patient   -will continue p.r.n. for now to avoid withdrawal      Chronic obstructive pulmonary disease with acute exacerbation  -concern for acute exacerbation given worsening shortness of breath and increased sputum production  -initiated on COPD pathway   -prednisone and antibiotics, DuoNebs; continue home supplemental oxygen   -switch home trilogy inhaler to formulary  -needs outpatient pulmonology follow-up and would benefit from pulmonary rehab    Physical deconditioning  -significant debility   -PT/OT consultation      ESRD (end stage renal disease) on dialysis  -missed dialysis today due to respiratory disease  -nephrology consulted      Chronic diastolic heart failure  -stable   -continue home dose Lasix on non dialysis days  -dialysis      Essential hypertension  -borderline hypotension on arrival   -discontinued home antihypertensives; intermittently takes midodrine at home, usually with dialysis      Chronic pain  -chronic opioid use   -will continue home medication        VTE Risk Mitigation (From admission, onward)         Ordered     heparin (porcine) injection 5,000 Units  Every 12 hours         09/29/23 1546     IP VTE HIGH RISK PATIENT  Once         09/29/23 1517     Place sequential compression device   Until discontinued         09/29/23 1517                Discharge Planning   LAITH:      Code Status: DNR   Is the patient medically ready for discharge?:     Reason for patient still in hospital (select all that apply): Patient trending condition and Treatment                     Moses Hernández MD  Department of [facility] Medicine   [facility]

## 2023-09-30 NOTE — ASSESSMENT & PLAN NOTE
This patient does have evidence of infective focus  My overall impression is sepsis.  Source: Respiratory  Antibiotics given-   Antibiotics (72h ago, onward)    Start     Stop Route Frequency Ordered    09/30/23 1500  cefTRIAXone (ROCEPHIN) 1 g in dextrose 5 % in water (D5W) 100 mL IVPB (MB+)  (CEFTRIAXONE IV/ AZITHROMYCIN PO PANEL)        See Hyperspace for full Linked Orders Report.    -- IV Every 24 hours (non-standard times) 09/29/23 1517    09/30/23 0900  azithromycin tablet 500 mg  (CEFTRIAXONE IV/ AZITHROMYCIN PO PANEL)        See Hyperspace for full Linked Orders Report.    -- Oral Daily 09/29/23 1517    09/29/23 2100  mupirocin 2 % ointment         10/04/23 2059 Nasl 2 times daily 09/29/23 1948        Latest lactate reviewed-  Recent Labs   Lab 09/29/23  1505   LACTATE 1.3     Organ dysfunction indicated by Acute respiratory failure    Fluid challenge Contraindicated- Fluid bolus is contraindicated in this patient due to End Stage Renal Disease     Post- resuscitation assessment No - Post resuscitation assessment not needed       Will Not start Pressors- Levophed for MAP of 65  Source control achieved by:  IV antibiotics  -sepsis physiology improving today

## 2023-10-01 VITALS
DIASTOLIC BLOOD PRESSURE: 78 MMHG | SYSTOLIC BLOOD PRESSURE: 99 MMHG | WEIGHT: 99.88 LBS | OXYGEN SATURATION: 95 % | RESPIRATION RATE: 20 BRPM | BODY MASS INDEX: 18.38 KG/M2 | TEMPERATURE: 98 F | HEART RATE: 83 BPM | HEIGHT: 62 IN

## 2023-10-01 LAB
ANION GAP SERPL CALC-SCNC: 14 MMOL/L (ref 8–16)
BASOPHILS # BLD AUTO: 0.04 K/UL (ref 0–0.2)
BASOPHILS NFR BLD: 0.2 % (ref 0–1.9)
BUN SERPL-MCNC: 33 MG/DL (ref 8–23)
CALCIUM SERPL-MCNC: 9 MG/DL (ref 8.7–10.5)
CHLORIDE SERPL-SCNC: 97 MMOL/L (ref 95–110)
CO2 SERPL-SCNC: 25 MMOL/L (ref 23–29)
CREAT SERPL-MCNC: 3.8 MG/DL (ref 0.5–1.4)
DIFFERENTIAL METHOD: ABNORMAL
EOSINOPHIL # BLD AUTO: 0 K/UL (ref 0–0.5)
EOSINOPHIL NFR BLD: 0.2 % (ref 0–8)
ERYTHROCYTE [DISTWIDTH] IN BLOOD BY AUTOMATED COUNT: 14.4 % (ref 11.5–14.5)
EST. GFR  (NO RACE VARIABLE): 11 ML/MIN/1.73 M^2
GLUCOSE SERPL-MCNC: 112 MG/DL (ref 70–110)
HCT VFR BLD AUTO: 36.7 % (ref 37–48.5)
HGB BLD-MCNC: 11.6 G/DL (ref 12–16)
IMM GRANULOCYTES # BLD AUTO: 0.46 K/UL (ref 0–0.04)
IMM GRANULOCYTES NFR BLD AUTO: 2.6 % (ref 0–0.5)
LYMPHOCYTES # BLD AUTO: 1.9 K/UL (ref 1–4.8)
LYMPHOCYTES NFR BLD: 11 % (ref 18–48)
MAGNESIUM SERPL-MCNC: 2.1 MG/DL (ref 1.6–2.6)
MCH RBC QN AUTO: 32.7 PG (ref 27–31)
MCHC RBC AUTO-ENTMCNC: 31.6 G/DL (ref 32–36)
MCV RBC AUTO: 103 FL (ref 82–98)
MONOCYTES # BLD AUTO: 1.2 K/UL (ref 0.3–1)
MONOCYTES NFR BLD: 6.9 % (ref 4–15)
NEUTROPHILS # BLD AUTO: 13.9 K/UL (ref 1.8–7.7)
NEUTROPHILS NFR BLD: 79.1 % (ref 38–73)
NRBC BLD-RTO: 0 /100 WBC
PHOSPHATE SERPL-MCNC: 4.6 MG/DL (ref 2.7–4.5)
PLATELET # BLD AUTO: 271 K/UL (ref 150–450)
PMV BLD AUTO: 10.2 FL (ref 9.2–12.9)
POTASSIUM SERPL-SCNC: 3.7 MMOL/L (ref 3.5–5.1)
RBC # BLD AUTO: 3.55 M/UL (ref 4–5.4)
SODIUM SERPL-SCNC: 136 MMOL/L (ref 136–145)
WBC # BLD AUTO: 17.52 K/UL (ref 3.9–12.7)

## 2023-10-01 PROCEDURE — 97535 SELF CARE MNGMENT TRAINING: CPT | Mod: HCNC

## 2023-10-01 PROCEDURE — 25000003 PHARM REV CODE 250: Mod: HCNC | Performed by: HOSPITALIST

## 2023-10-01 PROCEDURE — 97530 THERAPEUTIC ACTIVITIES: CPT | Mod: HCNC

## 2023-10-01 PROCEDURE — 84100 ASSAY OF PHOSPHORUS: CPT | Mod: HCNC | Performed by: HOSPITALIST

## 2023-10-01 PROCEDURE — 80048 BASIC METABOLIC PNL TOTAL CA: CPT | Mod: HCNC | Performed by: HOSPITALIST

## 2023-10-01 PROCEDURE — 83735 ASSAY OF MAGNESIUM: CPT | Mod: HCNC | Performed by: HOSPITALIST

## 2023-10-01 PROCEDURE — 36415 COLL VENOUS BLD VENIPUNCTURE: CPT | Mod: HCNC | Performed by: HOSPITALIST

## 2023-10-01 PROCEDURE — 85025 COMPLETE CBC W/AUTO DIFF WBC: CPT | Mod: HCNC | Performed by: HOSPITALIST

## 2023-10-01 PROCEDURE — 99900035 HC TECH TIME PER 15 MIN (STAT): Mod: HCNC

## 2023-10-01 PROCEDURE — 90694 VACC AIIV4 NO PRSRV 0.5ML IM: CPT | Mod: HCNC | Performed by: HOSPITALIST

## 2023-10-01 PROCEDURE — 94761 N-INVAS EAR/PLS OXIMETRY MLT: CPT | Mod: HCNC

## 2023-10-01 PROCEDURE — 63700000 PHARM REV CODE 250 ALT 637 W/O HCPCS: Mod: HCNC | Performed by: HOSPITALIST

## 2023-10-01 PROCEDURE — 90471 IMMUNIZATION ADMIN: CPT | Mod: HCNC | Performed by: HOSPITALIST

## 2023-10-01 PROCEDURE — 25000242 PHARM REV CODE 250 ALT 637 W/ HCPCS: Mod: HCNC | Performed by: HOSPITALIST

## 2023-10-01 PROCEDURE — 63600175 PHARM REV CODE 636 W HCPCS: Mod: HCNC | Performed by: HOSPITALIST

## 2023-10-01 PROCEDURE — 97165 OT EVAL LOW COMPLEX 30 MIN: CPT | Mod: HCNC

## 2023-10-01 PROCEDURE — G0008 ADMIN INFLUENZA VIRUS VAC: HCPCS | Mod: HCNC | Performed by: HOSPITALIST

## 2023-10-01 PROCEDURE — 94640 AIRWAY INHALATION TREATMENT: CPT | Mod: HCNC

## 2023-10-01 RX ORDER — AZELASTINE 1 MG/ML
1 SPRAY, METERED NASAL 2 TIMES DAILY PRN
Start: 2023-10-01 | End: 2024-09-30

## 2023-10-01 RX ORDER — LEVOFLOXACIN 500 MG/1
500 TABLET, FILM COATED ORAL DAILY
Qty: 4 TABLET | Refills: 0 | Status: SHIPPED | OUTPATIENT
Start: 2023-10-01 | End: 2023-10-05

## 2023-10-01 RX ORDER — PREDNISONE 20 MG/1
40 TABLET ORAL DAILY
Qty: 6 TABLET | Refills: 0 | Status: SHIPPED | OUTPATIENT
Start: 2023-10-02 | End: 2023-10-05

## 2023-10-01 RX ADMIN — AZITHROMYCIN 500 MG: 250 TABLET, FILM COATED ORAL at 10:10

## 2023-10-01 RX ADMIN — BENZONATATE 100 MG: 100 CAPSULE ORAL at 01:10

## 2023-10-01 RX ADMIN — Medication 6 MG: at 12:10

## 2023-10-01 RX ADMIN — PREDNISONE 40 MG: 20 TABLET ORAL at 10:10

## 2023-10-01 RX ADMIN — HYDROCODONE BITARTRATE AND ACETAMINOPHEN 1 TABLET: 10; 325 TABLET ORAL at 01:10

## 2023-10-01 RX ADMIN — TIOTROPIUM BROMIDE INHALATION SPRAY 2 PUFF: 3.12 SPRAY, METERED RESPIRATORY (INHALATION) at 09:10

## 2023-10-01 RX ADMIN — IPRATROPIUM BROMIDE AND ALBUTEROL SULFATE 3 ML: 2.5; .5 SOLUTION RESPIRATORY (INHALATION) at 09:10

## 2023-10-01 RX ADMIN — MUPIROCIN: 20 OINTMENT TOPICAL at 10:10

## 2023-10-01 RX ADMIN — MEGESTROL ACETATE 20 MG: 20 TABLET ORAL at 10:10

## 2023-10-01 RX ADMIN — ALLOPURINOL 100 MG: 100 TABLET ORAL at 10:10

## 2023-10-01 RX ADMIN — INFLUENZA A VIRUS A/VICTORIA/4897/2022 IVR-238 (H1N1) ANTIGEN (FORMALDEHYDE INACTIVATED), INFLUENZA A VIRUS A/DARWIN/6/2021 IVR-227 (H3N2) ANTIGEN (FORMALDEHYDE INACTIVATED), INFLUENZA B VIRUS B/AUSTRIA/1359417/2021 BVR-26 ANTIGEN (FORMALDEHYDE INACTIVATED), INFLUENZA B VIRUS B/PHUKET/3073/2013 BVR-1B ANTIGEN (FORMALDEHYDE INACTIVATED) 0.5 ML: 15; 15; 15; 15 INJECTION, SUSPENSION INTRAMUSCULAR at 10:10

## 2023-10-01 RX ADMIN — FLUTICASONE FUROATE AND VILANTEROL TRIFENATATE 1 PUFF: 100; 25 POWDER RESPIRATORY (INHALATION) at 09:10

## 2023-10-01 NOTE — CARE UPDATE
Notified regarding hypotension. Patient went to HD today and had 1L removed. BP noted to be 75/46 per machine and 76/44 manually. 250 ml bolus ordered. However, it was discovered that patient had taken THC gummies prior to the hypotensive episode. Patient asymptomatic but does state some mild SOB so will not give IVF bolus. Gummies removed from room.

## 2023-10-01 NOTE — HOSPITAL COURSE
"Ms. Quevedo presented with sepsis due to LLL pneumonia with acute on chronic hypoxic respiratory failure and COPD exacerbation. She had missed dialysis on day of admission due to her illness. Started on COPD pathway with steroids, duonebs, and antibiotics to treat for PNA. Respiratory symptoms have improved significantly and she is back on home O2 requirements and feels back to her normal respiratory baseline. Received dialysis while in house. Will discharge on short course of antibiotics and steroids. Will set up with home health and refer to Pulmonology and Pulm Rehab at discharge.    BP 99/78 (BP Location: Right arm, Patient Position: Lying)   Pulse 83   Temp 97.9 °F (36.6 °C) (Oral)   Resp 20   Ht 5' 2" (1.575 m)   Wt 45.3 kg (99 lb 13.9 oz)   LMP  (LMP Unknown)   SpO2 95%   Breastfeeding No   BMI 18.27 kg/m²   Physical Exam  Vitals reviewed.   Constitutional:       General: She is not in acute distress.     Appearance: She is well-developed. She is ill-appearing. She is not diaphoretic.   HENT:      Head: Normocephalic and atraumatic.      Nose: Nose normal.   Eyes:      General: No scleral icterus.     Pupils: Pupils are equal, round, and reactive to light.   Neck:      Vascular: No JVD.      Trachea: No tracheal deviation.   Cardiovascular:      Rate and Rhythm: Normal rate and regular rhythm.      Heart sounds: Normal heart sounds.   Pulmonary:      Effort: No tachypnea or respiratory distress.      Breath sounds: Rhonchi present but improving.      Comments: On supplemental oxygen  Abdominal:      General: There is no distension.      Palpations: Abdomen is soft.      Tenderness: There is no abdominal tenderness.   Musculoskeletal:         General: No deformity.      Cervical back: Normal range of motion.   Skin:     General: Skin is warm and dry.      Findings: No rash.   Neurological:      Mental Status: She is alert and oriented to person, place, and time.   Psychiatric:         Behavior: Behavior " normal.

## 2023-10-01 NOTE — PLAN OF CARE
AVS and educational attachments shared with patient 's  via hospital room phone. Discussed thoroughly. Patient's  verbalized clear understanding using teach back method. Notified bedside nurse of completion of education. At present no distress noted. Patient to be discharged via w/c with escort service and family with all of patient's belonings. Will cont to be available to patient and family and intervene prn.

## 2023-10-01 NOTE — PT/OT/SLP EVAL
Occupational Therapy   Evaluation    Name: Katie Quevedo  MRN: [id number]  Admitting Diagnosis: Acute on chronic respiratory failure with hypoxia  Recent Surgery: * No surgery found *      Recommendations:     Discharge Recommendations:  (low intensity therapy with transition to pulmonary rehab if able to tolerate within HD schedule)  Discharge Equipment Recommendations:  none  Barriers to discharge:  None    Assessment:     Katie Quevedo is a 78 y.o. female with a medical diagnosis of Acute on chronic respiratory failure with hypoxia.  She presents with deconditioning. Performance deficits affecting function:  .      Rehab Prognosis: Good; patient would benefit from acute skilled OT services to address these deficits and reach maximum level of function.       Plan:     Patient to be seen   to address the above listed problems via    Plan of Care Expires:    Plan of Care Reviewed with: patient, spouse    Subjective     Chief Complaint: Pt reports she is feeling a bit better  Patient/Family Comments/goals: to return home, to return to PLOF    Occupational Profile:  Living Environment: Pt lives with [place] [facility] [place], [place] in [place]  Previous level of function: Mod I to Indep ADLs and functional mobility; PRN assist from spouse and family when needed  Roles and Routines: Caretaker to self and home. Light housework and meal prep. Pt states that she goes to hair and nail salon with daughters, shops when able and likes to be out in community but has some difficulty 2/2 breathing endurance  Equipment Used at Home: rollator, walker, rolling (foldable power scooter)  Assistance upon Discharge: Family    Pain/Comfort:  Pain Rating 1: 8/10  Location - Orientation 1: generalized  Location 1: back  Pain Addressed 1: Reposition, Distraction, Cessation of Activity, Nurse notified  Pain Rating Post-Intervention 1:  (did not rate, reported fairly comfortable while seated EOB)    Patients cultural, spiritual, Muslim conflicts given the  current situation:      Objective:     Communicated with: nskyung prior to session.  Patient found HOB elevated with peripheral IV, bed alarm upon OT entry to room.    General Precautions: Standard, fall  Orthopedic Precautions: N/A  Braces: N/A  Respiratory Status: Nasal cannula    Occupational Performance:    Bed Mobility:    Patient completed Rolling/Turning to Left with  supervision  Patient completed Scooting/Bridging with supervision  Patient completed Supine to Sit with supervision  Patient completed Sit to Supine with supervision    Functional Mobility/Transfers:  Patient completed Sit <> Stand Transfer with supervision and stand by assistance  with  no assistive device and 4 wheeled walker   Functional Mobility: Pt with fair dynamic seated and standing balance but fairly significant SOB after standing to don pants    Activities of Daily Living:  Upper Body Dressing: supervision to don shirt seated EOB  Lower Body Dressing: stand by assistance to don pants in seated and stance, increased time to recover from BERTRAND after task    Cognitive/Visual Perceptual:  Cognitive/Psychosocial Skills:     -       Oriented to: Person, Place, Time and Situation   -       Follows Commands/attention:Follows multistep  commands  -       Communication: clear/fluent  -       Memory: No Deficits noted  -       Safety awareness/insight to disability: intact   -       Mood/Affect/Coping skills/emotional control: Appropriate to situation    Physical Exam:  Postural examination/scapula alignment:    -       Rounded shoulders  Skin integrity: Visible skin intact  Sensation:    -       Intact  Motor Planning:    -       WFL  Dominant hand:    -       R handed  Upper Extremity Range of Motion: BUE WFL     Upper Extremity Strength:  BUE grossly 3+ to 4-/5   Strength:  B hands WFL  Fine Motor Coordination:    -       Intact  Gross motor coordination:   WFL     AMPAC 6 Click ADL:  AMPAC Total Score: 20    Treatment & Education:  Pt educated  on role of OT and POC.   Pt performing skills as listed above.   Pt educated on benefits of pulmonary rehab but spouse reports that POC was set for 3x/week and with pt's HD schedule, she was unable to commit to 3x/week as she would be completely drained each day on attempts to go    Patient left HOB elevated with all lines intact, call button in reach, bed alarm on, nsg notified, and spouse present    GOALS:   Multidisciplinary Problems       Occupational Therapy Goals       Not on file                    History:     Past Medical History:   Diagnosis Date    Acute congestive heart failure 02/10/2020    Anemia     Bilateral renal cysts     Cataract     Chronic LBP 7/26/2012    Chronic pain     CKD (chronic kidney disease), stage IV     Colon polyp 2013    COPD (chronic obstructive pulmonary disease)     Dehydration     Encounter for blood transfusion     HTN (hypertension)     Lumbar spondylosis     Melanoma     of the lip    Metabolic bone disease     Migraines, neuralgic     Osteoporosis     Primary osteoarthritis of both knees     s/p Rt TKA    Pulmonary embolism with infarction     Seizures 1972    x1 only    Subdeltoid bursitis, L>R. 9/27/2012    Ulcer     Vitamin D deficiency disease          Past Surgical History:   Procedure Laterality Date    BLADDER SUSPENSION      CATARACT EXTRACTION  11/18/13    left eye    CERVICAL LAMINECTOMY      x3, fusion x1    COLONOSCOPY  2009    DECLOTTING OF ARTERIOVENOUS GRAFT Left 1/3/2022    Procedure: [id number];  Surgeon: Lindsey Louie MD;  Location: [facility] OR;  Service: Vascular;  Laterality: Left;    DECLOTTING OF ARTERIOVENOUS GRAFT Left 2/8/2022    Procedure: [id number];  Surgeon: Lindsey Louie MD;  Location: [facility] OR;  Service: Vascular;  Laterality: Left;    DECLOTTING OF ARTERIOVENOUS GRAFT Left 4/8/2022    Procedure: [id number];  Surgeon: Lindsey Louie MD;  Location: [facility] OR;  Service: Vascular;  Laterality: Left;     FISTULOGRAM N/A 2/27/2020    Procedure: Fistulogram;  Surgeon: Noe Benitez MD;  Location: [facility] CATH LAB/EP;  Service: Cardiology;  Laterality: N/A;    HYSTERECTOMY      JOINT REPLACEMENT  2001    total right knee     LUMBAR LAMINECTOMY      x 3, fusion x1    OOPHORECTOMY      PERIPHERAL ANGIOGRAPHY N/A 3/9/2020    Procedure: Peripheral angiography;  Surgeon: Jacinto Henriquez MD;  Location: [facility] CATH LAB/EP;  Service: Cardiology;  Laterality: N/A;    PLACEMENT OF ARTERIOVENOUS GRAFT Left 1/21/2020    Procedure: INSERTION, GRAFT, ARTERIOVENOUS;  Surgeon: Lindsey Louie MD;  Location: [facility] OR;  Service: General;  Laterality: Left;    PLACEMENT OF ARTERIOVENOUS GRAFT Right 7/22/2022    Procedure: INSERTION, GRAFT, ARTERIOVENOUS;  Surgeon: Lindsey Louie MD;  Location: [facility] OR;  Service: General;  Laterality: Right;    PLACEMENT OF DUAL-LUMEN VASCULAR CATHETER Right 4/16/2022    Procedure: INSERTION-CATHETER-RICKI;  Surgeon: Lindsey Louie MD;  Location: [facility] OR;  Service: General;  Laterality: Right;    THROMBECTOMY Left 2/3/2020    Procedure: THROMBECTOMY;  Surgeon: Lindsey Louie MD;  Location: [facility] OR;  Service: General;  Laterality: Left;    THROMBECTOMY  3/9/2020    Procedure: Thrombectomy;  Surgeon: Jacinto Henriquez MD;  Location: [facility] CATH LAB/EP;  Service: Cardiology;;    THROMBECTOMY Left 4/7/2022    Procedure: THROMBECTOMY;  Surgeon: Lindsey Louie MD;  Location: [facility];  Service: General;  Laterality: Left;       Time Tracking:     OT Date of Treatment: 10/01/23  OT Start Time: 0958  OT Stop Time: 1027  OT Total Time (min): 29 min    Billable Minutes:Evaluation 10  Self Care/Home Management 19    10/2/2023

## 2023-10-01 NOTE — PLAN OF CARE
[place] - Telemetry      HOME HEALTH ORDERS  FACE TO FACE ENCOUNTER    Patient Name: Katie Quevedo  YOB: 1945    PCP: Kingston Verduzco MD   PCP Address: [address] / [place] [address]  PCP Phone Number: [phone]  PCP Fax: [phone]    Encounter Date: 9/29/23    Admit to Home Health    Diagnoses:  Active Hospital Problems    Diagnosis  POA    *Acute on chronic respiratory failure with hypoxia [J96.21]  Yes    Hyperkalemia [E87.5]  Yes    Community acquired pneumonia of left lower lobe of lung [J18.9]  Yes    Goals of care, counseling/discussion [Z71.89]  Not Applicable    Sepsis [A41.9]  Yes    Benzodiazepine dependence, continuous [F13.20]  Yes    Chronic obstructive pulmonary disease with acute exacerbation [J44.1]  Yes    Physical deconditioning [R53.81]  Yes    ESRD (end stage renal disease) on dialysis [N18.6, Z99.2]  Not Applicable     Chronic             Chronic diastolic heart failure [I50.32]  Yes     She takes lasix 20mg BID. Continue lasix & follow up with cardiology.      Essential hypertension [I10]  Yes     Chronic    Chronic pain [G89.29]  Yes      Resolved Hospital Problems   No resolved problems to display.       Follow Up Appointments:  Future Appointments   Date Time Provider Department Center   11/2/2023 10:00 AM Pushpa Mcmahon MD [facility] [place]       Allergies:  Review of patient's allergies indicates:   Allergen Reactions    Aspirin      Other reaction(s): hx of ulcers    Tetracycline Swelling     Other reaction(s): Swelling    Penicillins Rash     Other reaction(s): Hives  Other reaction(s): Rash  Other reaction(s): Rash  Other reaction(s): Hives       Medications: Review discharge medications with patient and family and provide education.    Current Facility-Administered Medications   Medication Dose Route Frequency Provider Last Rate Last Admin    0.9%  NaCl infusion   Intravenous PRN Ramo Da Silva MD        acetaminophen tablet 650 mg   650 mg Oral Q8H PRN Moses Hernández MD        albuterol-ipratropium 2.5 mg-0.5 mg/3 mL nebulizer solution 3 mL  3 mL Nebulization Q6H PRN Moses Hernández MD   3 mL at 09/30/23 2225    allopurinoL tablet 100 mg  100 mg Oral Daily Moses Hernández MD   100 mg at 09/30/23 1014    cefTRIAXone (ROCEPHIN) 1 g in dextrose 5 % in water (D5W) 100 mL IVPB (MB+)  1 g Intravenous Q24H Moses Hernández MD   Stopped at 09/30/23 1710    And    azithromycin tablet 500 mg  500 mg Oral Daily Moses Hernández MD   500 mg at 09/30/23 1015    benzonatate capsule 100 mg  100 mg Oral BID PRN Moses Hernández MD   100 mg at 10/01/23 0135    diclofenac sodium 1 % gel 2 g  2 g Topical (Top) TID Moses Hernández MD   2 g at 09/29/23 2211    fluticasone furoate-vilanteroL 100-25 mcg/dose diskus inhaler 1 puff  1 puff Inhalation Daily Moses Hernández MD   1 puff at 09/30/23 0900    furosemide tablet 80 mg  80 mg Oral BID Moses Hernández MD   80 mg at 09/30/23 1015    heparin (porcine) injection 4,000 Units  4,000 Units Intra-Catheter PRN Ramo Da Silva MD        heparin (porcine) injection 5,000 Units  5,000 Units Subcutaneous Q12H Moses Hernández MD   5,000 Units at 09/30/23 2038    HYDROcodone-acetaminophen  mg per tablet 1 tablet  1 tablet Oral Q8H PRN Moses Hernández MD   1 tablet at 10/01/23 0135    influenza 65up-adj (QUADRIVALENT ADJUVANTED PF) vaccine 0.5 mL  0.5 mL Intramuscular vaccine x 1 dose Moses Hernández MD        LORazepam tablet 0.5 mg  0.5 mg Oral Q12H PRN Moses Hernández MD   0.5 mg at 09/30/23 1101    megestroL tablet 20 mg  20 mg Oral BID Moses Hernández MD   20 mg at 09/30/23 2036    melatonin tablet 6 mg  6 mg Oral Nightly PRN Moses Hernández MD   6 mg at 10/01/23 0044    mirtazapine tablet 7.5 mg  7.5 mg Oral QHS Moses Hernández MD   7.5 mg at 09/29/23 1093    mupirocin 2 %  ointment   Nasal BID Moses Hernández MD   Given at 09/30/23 2038    ondansetron disintegrating tablet 8 mg  8 mg Oral Q8H PRN Moses Hernández MD   8 mg at 09/29/23 2202    polyethylene glycol packet 17 g  17 g Oral Daily Moses Hernández MD        predniSONE tablet 40 mg  40 mg Oral Daily Moses Hernández MD   40 mg at 09/30/23 1015    sodium chloride 0.9% bolus 250 mL 250 mL  250 mL Intravenous PRN Ramo Da Silva MD        sodium chloride 0.9% flush 3 mL  3 mL Intravenous PRN Moses Hernández MD        tiotropium bromide 2.5 mcg/actuation inhaler 2 puff  2 puff Inhalation Daily Moses Hernández MD   2 puff at 09/30/23 0900     Current Discharge Medication List        CONTINUE these medications which have CHANGED    Details   azelastine (ASTELIN) 137 mcg (0.1 %) nasal spray 1 spray (137 mcg total) by Nasal route 2 (two) times daily as needed for Rhinitis.      levoFLOXacin (LEVAQUIN) 500 MG tablet Take 1 tablet (500 mg total) by mouth once daily. for 4 days  Qty: 4 tablet, Refills: 0    Associated Diagnoses: Community acquired pneumonia of left lower lobe of lung      predniSONE (DELTASONE) 20 MG tablet Take 2 tablets (40 mg total) by mouth once daily. for 3 days  Qty: 6 tablet, Refills: 0           CONTINUE these medications which have NOT CHANGED    Details   albuterol (PROVENTIL/VENTOLIN HFA) 90 mcg/actuation inhaler Inhale 2 puffs into the lungs every 6 (six) hours as needed for Wheezing. Rescue  Qty: 18 g, Refills: 11    Associated Diagnoses: Centrilobular emphysema      albuterol-ipratropium (DUO-NEB) 2.5 mg-0.5 mg/3 mL nebulizer solution Take 3 mLs by nebulization every 6 (six) hours as needed for Shortness of Breath. Rescue  Qty: 270 mL, Refills: 11    Associated Diagnoses: Centrilobular emphysema      B complex-vitamin C-folic acid (RENAL-RADHA) 0.8 mg Tab Take 0.8 mg by mouth every Mon, Wed, Fri.      benzonatate (TESSALON) 100 MG capsule Take 1 capsule  (100 mg total) by mouth 2 (two) times daily as needed for Cough.  Qty: 60 capsule, Refills: 0    Associated Diagnoses: Chronic cough      busPIRone (BUSPAR) 10 MG tablet Take 10 mg by mouth 2 (two) times daily. On Mondays, Wednesdays, and Fridays      cyproheptadine (PERIACTIN) 4 mg tablet Take 1 tablet by mouth 3 (three) times daily as needed.      diclofenac sodium (VOLTAREN) 1 % Gel Apply 2 g topically 3 (three) times daily as needed.      doxercalciferol (HECTOROL IV) Inject 2 mcg into the vein every Mon, Wed, Fri.      fluticasone-umeclidin-vilanter (TRELEGY ELLIPTA) 200-62.5-25 mcg inhaler Inhale 1 puff into the lungs once daily.  Qty: 60 each, Refills: 11    Associated Diagnoses: Centrilobular emphysema      furosemide (LASIX) 80 MG tablet Take 1 tablet (80 mg total) by mouth 2 (two) times daily On non dialysis days Tuesday-Thursday- Saturday -Sunday  Qty: 60 tablet, Refills: 11    Comments: On non dialysis days Tuesday-Thursday- Saturday -Sunday      HYDROcodone-acetaminophen (NORCO)  mg per tablet Take 1 tablet by mouth 3 (three) times daily as needed for Pain.  Qty: 90 tablet, Refills: 0    Comments: Medically necessary for > 7 days due to chronic pain.      LORazepam (ATIVAN) 0.5 MG tablet Take 1 tablet (0.5 mg total) by mouth every 8 (eight) hours as needed for Anxiety (use 1-2 before dialysis for anxiety, prn bedtime).  Qty: 90 tablet, Refills: 1    Associated Diagnoses: Adjustment reaction with anxiety and depression      megestroL (MEGACE) 20 MG Tab Take 1 tablet (20 mg total) by mouth 2 (two) times daily.  Qty: 60 tablet, Refills: 11    Associated Diagnoses: Moderate protein-calorie malnutrition      midodrine (PROAMATINE) 10 MG tablet Take 1 tablet (10 mg total) by mouth in the morning and 1 tablet (10 mg total) in the evening and 1 tablet (10 mg total) before bedtime.  Qty: 90 tablet, Refills: 3      mirtazapine (REMERON) 7.5 MG Tab Take 1 tablet (7.5 mg total) by mouth every evening.  Qty:  30 tablet, Refills: 11      sevelamer carbonate (RENVELA) 800 mg Tab Take 1 tablet by mouth 3 times a day  Qty: 90 tablet, Refills: 12      sodium chloride 7% 7 % nebulizer solution Take 4 mLs by nebulization 2 (two) times a day.  Qty: 240 mL, Refills: 2    Associated Diagnoses: Centrilobular emphysema; Bronchiectasis without complication      ondansetron 4 mg/2 mL Soln            STOP taking these medications       allopurinoL (ZYLOPRIM) 100 MG tablet Comments:   Reason for Stopping:                 I have seen and examined this patient within the last 30 days. My clinical findings that support the need for the home health skilled services and home bound status are the following:no   Weakness/numbness causing balance and gait disturbance due to COPD Exacerbation and Weakness/Debility making it taxing to leave home.     Diet:   regular diet    Labs:  N/a    Referrals/ Consults  Physical Therapy to evaluate and treat. Evaluate for home safety and equipment needs; Establish/upgrade home exercise program. Perform / instruct on therapeutic exercises, gait training, transfer training, and Range of Motion.  Occupational Therapy to evaluate and treat. Evaluate home environment for safety and equipment needs. Perform/Instruct on transfers, ADL training, ROM, and therapeutic exercises.  Aide to provide assistance with personal care, ADLs, and vital signs.    Activities:   activity as tolerated    Nursing:   Agency to admit patient within 24 hours of hospital discharge unless specified on physician order or at patient request    SN to complete comprehensive assessment including routine vital signs. Instruct on disease process and s/s of complications to report to MD. Review/verify medication list sent home with the patient at time of discharge  and instruct patient/caregiver as needed. Frequency may be adjusted depending on start of care date.     Skilled nurse to perform up to 3 visits PRN for symptoms related to  diagnosis    Notify MD if SBP > 160 or < 90; DBP > 90 or < 50; HR > 120 or < 50; Temp > 101; O2 < 88%; Other:       Ok to schedule additional visits based on staff availability and patient request on consecutive days within the home health episode.    When multiple disciplines ordered:    Start of Care occurs on Sunday - Wednesday schedule remaining discipline evaluations as ordered on separate consecutive days following the start of care.    Thursday SOC -schedule subsequent evaluations Friday and Monday the following week.     Friday - Saturday SOC - schedule subsequent discipline evaluations on consecutive days starting Monday of the following week.    For all post-discharge communication and subsequent orders please contact patient's primary care physician. If unable to reach primary care physician or do not receive response within 30 minutes, please contact Clemencia Gambino for clinical staff order clarification    Miscellaneous   Routine Skin for Bedridden Patients: Instruct patient/caregiver to apply moisture barrier cream to all skin folds and wet areas in perineal area daily and after baths and all bowel movements.  Home Oxygen:  No change    Home Health Aide:  Nursing Weekly, Physical Therapy Twice weekly, Occupational Therapy Twice weekly, Respiratory Therapy Three times weekly, and Home Health Aide Three times weekly    Wound Care Orders  no    I certify that this patient is confined to her home and needs intermittent skilled nursing care, physical therapy, and occupational therapy.

## 2023-10-01 NOTE — PROGRESS NOTES
Nephrology Progress Note       Consult Requested By: Moses Hernández,*  Reason for Consult: ESRD     SUBJECTIVE:        ?    Review of Systems   Constitutional:  Negative for chills, fever and malaise/fatigue.   HENT:  Negative for congestion and sore throat.    Eyes:  Negative for blurred vision, double vision and photophobia.   Respiratory:  Positive for cough and shortness of breath (baseline).    Cardiovascular:  Negative for chest pain, palpitations and leg swelling.   Gastrointestinal:  Negative for abdominal pain, diarrhea, nausea and vomiting.   Genitourinary:  Negative for dysuria and urgency.   Musculoskeletal:  Negative for joint pain and myalgias.   Skin:  Negative for itching and rash.   Neurological:  Negative for dizziness, sensory change, weakness and headaches.   Endo/Heme/Allergies:  Negative for polydipsia. Does not bruise/bleed easily.   Psychiatric/Behavioral:  Negative for depression.        Past Medical History:   Diagnosis Date    Acute congestive heart failure 02/10/2020    Anemia     Bilateral renal cysts     Cataract     Chronic LBP 7/26/2012    Chronic pain     CKD (chronic kidney disease), stage IV     Colon polyp 2013    COPD (chronic obstructive pulmonary disease)     Dehydration     Encounter for blood transfusion     HTN (hypertension)     Lumbar spondylosis     Melanoma     of the lip    Metabolic bone disease     Migraines, neuralgic     Osteoporosis     Primary osteoarthritis of both knees     s/p Rt TKA    Pulmonary embolism with infarction     Seizures 1972    x1 only    Subdeltoid bursitis, L>R. 9/27/2012    Ulcer     Vitamin D deficiency disease      Past Surgical History:   Procedure Laterality Date    BLADDER SUSPENSION      CATARACT EXTRACTION  11/18/13    left eye    CERVICAL LAMINECTOMY      x3, fusion x1    COLONOSCOPY  2009    DECLOTTING OF ARTERIOVENOUS GRAFT Left 1/3/2022    Procedure: [id number];  Surgeon: Lindsey Louie MD;  Location: [facility] OR;   Service: Vascular;  Laterality: Left;    DECLOTTING OF ARTERIOVENOUS GRAFT Left 2/8/2022    Procedure: ANOLQVOHRY-IDZRN-UB;  Surgeon: Lindsey Louie MD;  Location: [facility] OR;  Service: Vascular;  Laterality: Left;    DECLOTTING OF ARTERIOVENOUS GRAFT Left 4/8/2022    Procedure: GQIAIZFUPP-IPEIO-CG;  Surgeon: Lindsey Louie MD;  Location: [facility] OR;  Service: Vascular;  Laterality: Left;    FISTULOGRAM N/A 2/27/2020    Procedure: Fistulogram;  Surgeon: Noe Benitez MD;  Location: [facility] CATH LAB/EP;  Service: Cardiology;  Laterality: N/A;    HYSTERECTOMY      JOINT REPLACEMENT  2001    total right knee     LUMBAR LAMINECTOMY      x 3, fusion x1    OOPHORECTOMY      PERIPHERAL ANGIOGRAPHY N/A 3/9/2020    Procedure: Peripheral angiography;  Surgeon: Jacinto Henrqiuez MD;  Location: [facility] CATH LAB/EP;  Service: Cardiology;  Laterality: N/A;    PLACEMENT OF ARTERIOVENOUS GRAFT Left 1/21/2020    Procedure: INSERTION, GRAFT, ARTERIOVENOUS;  Surgeon: Lindsey Louie MD;  Location: [facility] OR;  Service: General;  Laterality: Left;    PLACEMENT OF ARTERIOVENOUS GRAFT Right 7/22/2022    Procedure: INSERTION, GRAFT, ARTERIOVENOUS;  Surgeon: Lindsey Louie MD;  Location: [facility] OR;  Service: General;  Laterality: Right;    PLACEMENT OF DUAL-LUMEN VASCULAR CATHETER Right 4/16/2022    Procedure: INSERTION-CATHETER-RICKI;  Surgeon: Lindsey Louie MD;  Location: [facility] OR;  Service: General;  Laterality: Right;    THROMBECTOMY Left 2/3/2020    Procedure: THROMBECTOMY;  Surgeon: Lindsey Louie MD;  Location: [facility] OR;  Service: General;  Laterality: Left;    THROMBECTOMY  3/9/2020    Procedure: Thrombectomy;  Surgeon: Jacinto Henriquez MD;  Location: [facility] CATH LAB/EP;  Service: Cardiology;;    THROMBECTOMY Left 4/7/2022    Procedure: THROMBECTOMY;  Surgeon: Lindsey Louie MD;  Location: [facility] OR;  Service: General;  Laterality: Left;     Family History   Problem Relation Age of Onset    Arthritis Mother      Stroke Mother     Hypertension Father     Cancer Father     Cataracts Father     Diabetes Maternal Aunt     Hypertension Maternal Grandfather     Heart disease Maternal Grandfather     Heart attack Maternal Grandfather     Cataracts Sister     Glaucoma Cousin      Social History     Tobacco Use    Smoking status: Former     Types: Cigarettes    Smokeless tobacco: Former     Quit date: 2/3/2015   Substance Use Topics    Alcohol use: Not Currently     Comment: Rare    Drug use: No       Review of patient's allergies indicates:   Allergen Reactions    Aspirin      Other reaction(s): hx of ulcers    Tetracycline Swelling     Other reaction(s): Swelling    Penicillins Rash     Other reaction(s): Hives  Other reaction(s): Rash  Other reaction(s): Rash  Other reaction(s): Hives            OBJECTIVE:     Vital Signs (Most Recent)  Vitals:    10/01/23 0450 10/01/23 0803 10/01/23 0938 10/01/23 0940   BP: (!) 96/51 99/78     BP Location: Right arm Right arm     Patient Position: Lying Lying     Pulse: 93 92 83 83   Resp: 18 18 20 20   Temp: 97.5 °F (36.4 °C) 97.9 °F (36.6 °C)     TempSrc: Oral Oral     SpO2: 96% 95% 95% 95%   Weight:       Height:                     Medications:          Physical Exam  Vitals and nursing note reviewed.   Constitutional:       General: She is not in acute distress.     Appearance: She is not ill-appearing or diaphoretic.   HENT:      Head: Normocephalic and atraumatic.      Mouth/Throat:      Pharynx: No oropharyngeal exudate.   Eyes:      General: No scleral icterus.     Conjunctiva/sclera: Conjunctivae normal.      Pupils: Pupils are equal, round, and reactive to light.   Cardiovascular:      Rate and Rhythm: Normal rate and regular rhythm.      Heart sounds: Normal heart sounds. No murmur heard.  Pulmonary:      Effort: Pulmonary effort is normal. No respiratory distress.      Breath sounds: Rales present. No wheezing.   Abdominal:      General: Bowel sounds are normal. There is no  distension.      Palpations: Abdomen is soft.      Tenderness: There is no abdominal tenderness.   Musculoskeletal:         General: Normal range of motion.      Cervical back: Normal range of motion and neck supple.      Right lower leg: No edema.      Left lower leg: No edema.      Comments:    CVCRIJ    Skin:     General: Skin is warm and dry.      Findings: No erythema.   Neurological:      Mental Status: She is alert and oriented to person, place, and time.      Cranial Nerves: No cranial nerve deficit.   Psychiatric:         Mood and Affect: Affect normal.         Cognition and Memory: Memory normal.         Judgment: Judgment normal.         Laboratory:  Recent Labs   Lab 09/29/23  1330 09/30/23  0329 10/01/23  0310   WBC 17.83* 13.61* 17.52*   HGB 12.5 11.3* 11.6*   HCT 39.5 36.1* 36.7*    228 271   MONO 3.6*  0.6 2.3*  0.3 6.9  1.2*   EOSINOPHIL 0.3 0.0 0.2       Recent Labs   Lab 09/29/23  1330 09/30/23  0329 10/01/23  0311    137 136   K 5.1 6.1* 3.7    102 97   CO2 19* 20* 25   BUN 44* 57* 33*   CREATININE 6.9* 7.0* 3.8*   CALCIUM 8.8 7.9* 9.0   PHOS  --  5.8* 4.6*         Diagnostic Results:  X-Ray: Reviewed  US: Reviewed  Echo: Reviewed  ASSESSMENT/PLAN:     1. ESRD  - HD MWF [facility] [place] [facility]\"" With Dr Aura Diggs   -- Last HD Wednesday     -- Here  SOB hypoxia PNA  HD with 1L  UF Saturday   Pending discharge resume HD  MWF   -- Daily Renal Function Panel  -- Avoid Hypotension.  -- Renally dose all meds  2. HTN (I10) -  Controlled   3. Anemia of chronic kidney disease treated with BILL (N18.9 D63.1) -   EPogen  with   HD as needed    Recent Labs   Lab 09/29/23  1330 09/30/23  0329 10/01/23  0310   HGB 12.5 11.3* 11.6*   HCT 39.5 36.1* 36.7*    228 271         Iron   Lab Results   Component Value Date    IRON 13 (L) 02/12/2020    TIBC 462 (H) 02/12/2020    FERRITIN 148 07/17/2019       4. MBD (E88.9 M90.80) -  Recent Labs   Lab 10/01/23  0311   CALCIUM 9.0   PHOS 4.6*       Recent Labs    Lab 09/29/23  1330 09/30/23  0329 10/01/23  0311   MG 2.5 2.4 2.1         Lab Results   Component Value Date    .0 (H) 12/28/2018    CALCIUM 9.0 10/01/2023    PHOS 4.6 (H) 10/01/2023     Lab Results   Component Value Date    [id number] 26 (L) 02/12/2020     Sevelamer   Lab Results   Component Value Date    CO2 25 10/01/2023       5. Nutrition/Hypoalbuminemia (E88.09) -   Recent Labs   Lab 09/29/23  1330   ALBUMIN 3.3*       Nepro with meals TID. Renal vitamins daily      Thank you for the consult, will follow  With any question please call [phone]  Ramo Da Silva MD    [facility]  EDGARD Bustillos MD, FACNEIL ECHEVARRIA MD,   MD CURTIS Benoit MD E. V. Harmon, NP    [address]  [place], [address] [phone]

## 2023-10-01 NOTE — PLAN OF CARE
VN note: VN completed AVS and attachments and notified bedside nurse, Janette. Will cont to be available and intervene prn.

## 2023-10-01 NOTE — PLAN OF CARE
Pt confirmed information on chart. Pt will dc and resume HH services. SW sent HH orders via careport. Pt gets HD Mon, Wed, Fri at 12pm at [facility]. Pt reports that her caretakers brings her to and from HD. Pt reports that her caretaker is there 5x a week from 8am -5pm. Per pt caretaker assist with ADLs when needed. Pt reports that she has her O2 portable tank at hospital to transport home. Pt reports that her  will provide transport home. No additional cm needs.     SW requested Pulm follow up  SW requested Pulm rehab  SW requested PCP follow up    Cleared from CM . Bedside Nurse and VN notified.    Pt declined HH. Pt will not dc with HH services.     Future Appointments   Date Time Provider Department Center   11/2/2023 10:00 AM Pushpa Mcmahon MD [facility]        10/01/23 0806   Final Note   Assessment Type Final Discharge Note   Anticipated Discharge Disposition Home-Health   Hospital Resources/Appts/Education Provided Appointments scheduled and added to AVS   Post-Acute Status   Discharge Delays None known at this time

## 2023-10-01 NOTE — NURSING
RAPID RESPONSE NURSE PROACTIVE ROUNDING NOTE       Time of Visit:     Admit Date: 2023  LOS: 1  Code Status: DNR   Date of Visit: 2023  : 1945  Age: 78 y.o.  Sex: female  Race: White  Bed: K477/K477 A:   MRN: [id number]  Was the patient discharged from an ICU this admission? No   Was the patient discharged from a PACU within last 24 hours? No   Did the patient receive conscious sedation/general anesthesia in last 24 hours? No   Was the patient in the ED within the past 24 hours? No   Was the patient on NIPPV within the past 24 hours? No   Attending Physician: Moses Hernández,*  Primary Service: Hospitalist   Time spent at the bedside: 15 -30 min    SITUATION    Notified by bedside RN via phone call  Reason for alert: Hypotension    Diagnosis: Acute on chronic respiratory failure with hypoxia   has a past medical history of Acute congestive heart failure, Anemia, Bilateral renal cysts, Cataract, Chronic LBP, Chronic pain, CKD (chronic kidney disease), stage IV, Colon polyp, COPD (chronic obstructive pulmonary disease), Dehydration, Encounter for blood transfusion, HTN (hypertension), Lumbar spondylosis, Melanoma, Metabolic bone disease, Migraines, neuralgic, Osteoporosis, Primary osteoarthritis of both knees, Pulmonary embolism with infarction, Seizures, Subdeltoid bursitis, L>R., Ulcer, and Vitamin D deficiency disease.    Last Vitals:  Temp: 97.6 °F (36.4 °C) (2020)  Pulse: 109 (2020)  Resp: 18 (2020)  BP: 76/44 (2026)  SpO2: 95 % (2030)    24 Hour Vitals Range:  Temp:  [97.1 °F (36.2 °C)-97.9 °F (36.6 °C)]   Pulse:  []   Resp:  [17-21]   BP: ()/(44-81)   SpO2:  [94 %-100 %]     Clinical Issues: Circulatory    ASSESSMENT/INTERVENTIONS    Pt resting in bed comfortably. Post dialysis pt hypotensive, discovered pt had taken THC gummies. Pt in no acute distress, awake, conversant.    RECOMMENDATIONS  Recheck blood pressure, remove gummies from room,  continue to monitor.    Discussed plan of care with  NP and bedside RN    PROVIDER ESCALATION      Disposition:Remain in room 477    FOLLOW UP    Call back the Rapid Response NurseBeltran at [phone] for additional questions or concerns.

## 2023-10-01 NOTE — DISCHARGE SUMMARY
[facility]  Discharge Summary      Patient Name: Katie Quevedo  MRN: [id number]  ARIADNA: [id number]  Patient Class: IP- Inpatient  Admission Date: 9/29/2023  Hospital Length of Stay: 2 days  Discharge Date and Time:  10/01/2023 10:32 AM  Attending Physician: Moses Hernández.,*   Discharging Provider: Moses Hernández MD  Primary Care Provider: Kingston Verduzco MD    Primary Care Team: Networked reference to record PCT     HPI:   Ms. Quevedo is an 77yo woman with ESRD on dialysis, hypertension, diastolic heart failure, and COPD on home oxygen who presents with worsening shortness of breath.  She states that over the past month or so she has been gradually feeling some worsening of her respiratory status but over the past 3 days it has acutely gotten much worse.  She is had a cough during this time that has been productive of sputum.  Reports having some shortness of breath at rest, which is worse than baseline.  Normally she is able to walk through room by gripping onto furniture; unable to cross the room without having to take a rest due to her breathing.  Denies any chest pain, palpitations, fevers, or chills.  She has been compliant with her dialysis; last dialysis session was on Wednesday.  When she went to dialysis today, she was told that she was breathing too fast and had to fast heart rate to safely undergo hemodialysis.  Of note, she had spoken with her primary care physician the day previously; they called in an antibiotic and steroids.  She took a dose of steroid but had not yet picked up the antibiotic prescription.      * No surgery found *      Hospital Course:   Ms. Quevedo presented with sepsis due to LLL pneumonia with acute on chronic hypoxic respiratory failure and COPD exacerbation. She had missed dialysis on day of admission due to her illness. Started on COPD pathway with steroids, duonebs, and antibiotics to treat for PNA. Respiratory symptoms have improved  "significantly and she is back on home O2 requirements and feels back to her normal respiratory baseline. Received dialysis while in house. Will discharge on short course of antibiotics and steroids. Will set up with home health and refer to Pulmonology and Pulm Rehab at discharge.    BP 99/78 (BP Location: Right arm, Patient Position: Lying)   Pulse 83   Temp 97.9 °F (36.6 °C) (Oral)   Resp 20   Ht 5' 2" (1.575 m)   Wt 45.3 kg (99 lb 13.9 oz)   LMP  (LMP Unknown)   SpO2 95%   Breastfeeding No   BMI 18.27 kg/m²   Physical Exam  Vitals reviewed.   Constitutional:       General: She is not in acute distress.     Appearance: She is well-developed. She is ill-appearing. She is not diaphoretic.   HENT:      Head: Normocephalic and atraumatic.      Nose: Nose normal.   Eyes:      General: No scleral icterus.     Pupils: Pupils are equal, round, and reactive to light.   Neck:      Vascular: No JVD.      Trachea: No tracheal deviation.   Cardiovascular:      Rate and Rhythm: Normal rate and regular rhythm.      Heart sounds: Normal heart sounds.   Pulmonary:      Effort: No tachypnea or respiratory distress.      Breath sounds: Rhonchi present but improving.      Comments: On supplemental oxygen  Abdominal:      General: There is no distension.      Palpations: Abdomen is soft.      Tenderness: There is no abdominal tenderness.   Musculoskeletal:         General: No deformity.      Cervical back: Normal range of motion.   Skin:     General: Skin is warm and dry.      Findings: No rash.   Neurological:      Mental Status: She is alert and oriented to person, place, and time.   Psychiatric:         Behavior: Behavior normal.        Goals of Care Treatment Preferences:  Code Status: DNR       LaPOST: Yes  What is most important right now is to focus on spending time at home, avoiding the hospital, remaining as independent as possible, improvement in condition but with limits to invasive therapies.  Accordingly, we have " decided that the best plan to meet the patient's goals includes continuing with treatment.      Consults:   Consults (From admission, onward)        Status Ordering Provider     IP consult to case management  Once        Provider:  (Not yet assigned)    Acknowledged BERTA GREEN     Inpatient consult to [facility] (Caterina Bustillos Nimkevych)  Once        Provider:  (Not yet assigned)    Acknowledged BERTA GREEN          No new Assessment & Plan notes have been filed under this hospital service since the last note was generated.  Service: Hospital Medicine    Final Active Diagnoses:    Diagnosis Date Noted POA    PRINCIPAL PROBLEM:  Acute on chronic respiratory failure with hypoxia [J96.21] 03/05/2018 Yes    Hyperkalemia [E87.5] 09/30/2023 Yes    Community acquired pneumonia of left lower lobe of lung [J18.9] 09/29/2023 Yes    Goals of care, counseling/discussion [Z71.89] 09/29/2023 Not Applicable    Sepsis [A41.9] 09/29/2023 Yes    Benzodiazepine dependence, continuous [F13.20] 08/18/2023 Yes    Chronic obstructive pulmonary disease with acute exacerbation [J44.1]  Yes    Physical deconditioning [R53.81] 12/10/2022 Yes    ESRD (end stage renal disease) on dialysis [N18.6, Z99.2] 02/19/2020 Not Applicable     Chronic    Chronic diastolic heart failure [I50.32] 10/30/2019 Yes    Essential hypertension [I10] 01/22/2018 Yes     Chronic    Chronic pain [G89.29]  Yes      Problems Resolved During this Admission:       Discharged Condition: good    Disposition: Home or Self Care    Follow Up:   Follow-up Information     [facility] Follow up.    Why: [facility].           [facility] Follow up.    Why: [facility] will contact patient of future follow up appointments.           Home Health Agency Follow up.    Why: Home Health Agency will contact patient to resume HH services.                     Patient  Instructions:      Ambulatory referral/consult to Pulmonology   Standing Status: Future   Referral Priority: Routine Referral Type: Consultation   Referral Reason: Specialty Services Required   Requested Specialty: Pulmonary Disease   Number of Visits Requested: 1     Ambulatory referral/consult to Pulmonary Rehab   Standing Status: Future   Referral Priority: Routine Referral Type: Consultation   Referral Reason: Specialty Services Required   Requested Specialty: Pulmonary Disease   Number of Visits Requested: 1       Significant Diagnostic Studies: N/A    Pending Diagnostic Studies:     Procedure Component Value Units Date/Time    Hepatitis B Surface Antibody, Qual/Quant [[id number]] Collected: 09/30/23 1300    Order Status: Sent Lab Status: In process Updated: 09/30/23 1614    Specimen: Blood          Medications:  Reconciled Home Medications:      Medication List      CHANGE how you take these medications    azelastine 137 mcg (0.1 %) nasal spray  Commonly known as: ASTELIN  1 spray (137 mcg total) by Nasal route 2 (two) times daily as needed for Rhinitis.  What changed:   · when to take this  · reasons to take this     predniSONE 20 MG tablet  Commonly known as: DELTASONE  Take 2 tablets (40 mg total) by mouth once daily. for 3 days  Start taking on: October 2, 2023  What changed:   · medication strength  · how much to take        CONTINUE taking these medications    albuterol 90 mcg/actuation inhaler  Commonly known as: PROVENTIL/VENTOLIN HFA  Inhale 2 puffs into the lungs every 6 (six) hours as needed for Wheezing. Rescue     albuterol-ipratropium 2.5 mg-0.5 mg/3 mL nebulizer solution  Commonly known as: DUO-NEB  Take 3 mLs by nebulization every 6 (six) hours as needed for Shortness of Breath. Rescue     benzonatate 100 MG capsule  Commonly known as: TESSALON  Take 1 capsule (100 mg total) by mouth 2 (two) times daily as needed for Cough.     busPIRone 10 MG tablet  Commonly known as: BUSPAR  Take 10 mg by  mouth 2 (two) times daily. On Mondays, Wednesdays, and Fridays     cyproheptadine 4 mg tablet  Commonly known as: PERIACTIN  Take 1 tablet by mouth 3 (three) times daily as needed.     diclofenac sodium 1 % Gel  Commonly known as: VOLTAREN  Apply 2 g topically 3 (three) times daily as needed.     furosemide 80 MG tablet  Commonly known as: LASIX  Take 1 tablet (80 mg total) by mouth 2 (two) times daily On non dialysis days Tuesday-Thursday- Saturday -Sunday     HECTOROL IV  Inject 2 mcg into the vein every Mon, Wed, Fri.     HYDROcodone-acetaminophen  mg per tablet  Commonly known as: NORCO  Take 1 tablet by mouth 3 (three) times daily as needed for Pain.     levoFLOXacin 500 MG tablet  Commonly known as: LEVAQUIN  Take 1 tablet (500 mg total) by mouth once daily. for 4 days     LORazepam 0.5 MG tablet  Commonly known as: ATIVAN  Take 1 tablet (0.5 mg total) by mouth every 8 (eight) hours as needed for Anxiety (use 1-2 before dialysis for anxiety, prn bedtime).     megestroL 20 MG Tab  Commonly known as: MEGACE  Take 1 tablet (20 mg total) by mouth 2 (two) times daily.     midodrine 10 MG tablet  Commonly known as: PROAMATINE  Take 1 tablet (10 mg total) by mouth in the morning and 1 tablet (10 mg total) in the evening and 1 tablet (10 mg total) before bedtime.     mirtazapine 7.5 MG Tab  Commonly known as: REMERON  Take 1 tablet (7.5 mg total) by mouth every evening.     ondansetron 4 mg/2 mL Soln     PulmosaL 7 % nebulizer solution  Generic drug: sodium chloride 7%  Take 4 mLs by nebulization 2 (two) times a day.     RENAL-RADHA 0.8 mg Tab  Generic drug: B complex-vitamin C-folic acid  Take 0.8 mg by mouth every Mon, Wed, Fri.     sevelamer carbonate 800 mg Tab  Commonly known as: RENVELA  Take 1 tablet by mouth 3 times a day     TRELEGY ELLIPTA 200-62.5-25 mcg inhaler  Generic drug: fluticasone-umeclidin-vilanter  Inhale 1 puff into the lungs once daily.            Indwelling Lines/Drains at time of  discharge:   Lines/Drains/Airways     Central Venous Catheter Line  Duration                Hemodialysis AV Graft Left upper arm -- days    Permacath right subclavian -- days                Time spent on the discharge of patient: 32 minutes         Moses Hernández MD  Department of Hospital Medicine  [facility] [facility]

## 2023-10-02 ENCOUNTER — PATIENT OUTREACH (OUTPATIENT)
Dept: ADMINISTRATIVE | Facility: CLINIC | Age: 78
End: 2023-10-02
Payer: MEDICARE

## 2023-10-02 NOTE — PROGRESS NOTES
C3 nurse attempted to contact Katie Quevedo, patient spouse, for a TCC post hospital discharge follow up call. Patient is unavailable, she is currently at Dialysis.  The patient does not have a scheduled HOSFU appointment. Message sent to PCP staff for assistance with scheduling visit with patient.

## 2023-10-03 ENCOUNTER — TELEPHONE (OUTPATIENT)
Dept: PRIMARY CARE CLINIC | Facility: CLINIC | Age: 78
End: 2023-10-03
Payer: MEDICARE

## 2023-10-03 NOTE — PROGRESS NOTES
C3 nurse spoke with Katie Quevedo  for a TCC post hospital discharge follow up call. The patient does not have a scheduled HOSFU appointment with Kingston Verduzco MD  within 5-7 days post hospital discharge date 10/01/2023. C3 nurse was unable to schedule HOSFU appointment in [facility].    Message sent to PCP staff requesting they contact patient and schedule follow up appointment.

## 2023-10-03 NOTE — PROGRESS NOTES
2nd Attempt made to reach patient for TCC call. Left voicemail please call [phone] leave first name, last name, and .   I will return your call.

## 2023-10-04 LAB
BACTERIA BLD CULT: NORMAL
BACTERIA BLD CULT: NORMAL
HBV SURFACE AB SER QL IA: POSITIVE
HBV SURFACE AB SERPL IA-ACNC: NORMAL MIU/ML

## 2023-10-04 NOTE — PHYSICIAN QUERY
PT Name: Katie Quevedo  MR #: [id number]     DOCUMENTATION CLARIFICATION     CDS/: Ivelisse Denney RN          Contact Information: [email or web address]    This form is a permanent document in the medical record.     Query Date: October 4, 2023    By submitting this query, we are merely seeking further clarification of documentation.  Please utilize your independent clinical judgment when addressing the question(s) below.  The Medical Record contains the following:  Indicators Supporting Clinical Findings Location in Medical Record   x HR         RR          BP        Temp T 98, HR , RR 20, BP 93/57, 95%  T 96-98, HR , RR 16-32, BP 87/53 9/29 vitals  9/30 vitals   x Lactic Acid          Procalcitonin  09/29/23 15:05   Lactate, Price 1.3      09/30/23 03:29   Procalcitonin 0.73 (H)    Lab results   x WBC           Bands          CRP     09/29/23 13:30 09/30/23 03:29 10/01/23 03:10   WBC 17.83 (H) 13.61 (H) 17.52 (H)    Lab results     Culture(s)      AMS, Confusion, LOC, etc.     x Organ Dysfunction/Failure Acute on chronic respiratory failure with hypoxia 9/29 H&P   x Bacteremia or Sepsis / Septic sepsis 9/29 H&P-10/1 DC Summary   x Known or Suspected Source of Infection documented Community acquired pneumonia of left lower lobe of lung 9/29 H&P    (Failed) Outpatient Treatment     x Medication azithromycin (ZITHROMAX) 500 mg IVPB x1   cefTRIAXone (ROCEPHIN) 1 g IVPB q24 hrs    azithromycin tablet 500 mg oral daily  9/29 medication   9/29-10/1 medications   9/30-10/1 medications    x Treatment sodium chloride 0.9% bolus 500 mL IV x1   sodium chloride 0.9% bolus 250 mL IV x1  9/29 medication   9/30 medication     Other        Due to the conflicting clinical picture, please clinically validate the diagnosis of Sepsis.    If validated, please provide additional clinical support for the diagnosis.     [   ] Sepsis diagnosis is not confirmed and/or it has been ruled out   [   ] Sepsis diagnosis is not  confirmed and/or it has been ruled out, other diagnosis ruled in (please          specify):_______________   [  x ] Sepsis diagnosis is confirmed. Please specify clinical support (signs, symptoms, and treatment) for  the confirmed diagnosis: _CAP with leukocytosis, tachycardia, hypotension___________________   [   ] Other clarification (please specify): ___________________       Please document in your progress notes daily for the duration of treatment until resolved and include in your discharge summary.

## 2023-10-05 ENCOUNTER — TELEPHONE (OUTPATIENT)
Dept: PULMONOLOGY | Facility: CLINIC | Age: 78
End: 2023-10-05
Payer: MEDICARE

## 2023-10-05 NOTE — TELEPHONE ENCOUNTER
----- Message from Mary Alex sent at 10/3/2023  6:45 PM CDT -----  Regarding: HFU  Patient will be discharged from [facility] and a HFU was requested with Pulmonary by DC provider.  Please schedule and message me back so DC can relay appointment information to patient. A referral has been entered.  Patient is established.    Patient has HD MWF    DC has also entered a referral for Pulm Rehab    DX: Community acquired pneumonia of left lower lobe of lung     Natacha Puri  Physician Referral Specialist/Discharge

## 2023-10-05 NOTE — TELEPHONE ENCOUNTER
Received a Epic message that Mrs Quevedo is being discharged from [facility] and needs a followup visit with Dr Rust. She saw him last  in June. Dr Rust's first [facility] visit is 11-6-23. He also has a [place] clinic at [facility] this month if Mrs Quevedo would like to be seen sooner. I will call Mrs Quevedo and let her know. Virginia Galvin

## 2023-10-07 ENCOUNTER — NURSE TRIAGE (OUTPATIENT)
Dept: ADMINISTRATIVE | Facility: CLINIC | Age: 78
End: 2023-10-07
Payer: MEDICARE

## 2023-10-08 ENCOUNTER — HOSPITAL ENCOUNTER (INPATIENT)
Facility: HOSPITAL | Age: 78
LOS: 1 days | Discharge: HOME HEALTH | DRG: 189 | End: 2023-10-09
Attending: EMERGENCY MEDICINE | Admitting: EMERGENCY MEDICINE
Payer: MEDICARE

## 2023-10-08 DIAGNOSIS — J44.1 COPD EXACERBATION: Primary | ICD-10-CM

## 2023-10-08 DIAGNOSIS — R07.9 CHEST PAIN: ICD-10-CM

## 2023-10-08 DIAGNOSIS — J96.21 ACUTE ON CHRONIC RESPIRATORY FAILURE WITH HYPOXIA: ICD-10-CM

## 2023-10-08 LAB
ALBUMIN SERPL BCP-MCNC: 3.7 G/DL (ref 3.5–5.2)
ALLENS TEST: ABNORMAL
ALP SERPL-CCNC: 52 U/L (ref 55–135)
ALT SERPL W/O P-5'-P-CCNC: 50 U/L (ref 10–44)
ANION GAP SERPL CALC-SCNC: 17 MMOL/L (ref 8–16)
ANION GAP SERPL CALC-SCNC: 17 MMOL/L (ref 8–16)
AST SERPL-CCNC: 27 U/L (ref 10–40)
BASOPHILS # BLD AUTO: 0.02 K/UL (ref 0–0.2)
BASOPHILS # BLD AUTO: 0.03 K/UL (ref 0–0.2)
BASOPHILS NFR BLD: 0.2 % (ref 0–1.9)
BASOPHILS NFR BLD: 0.2 % (ref 0–1.9)
BILIRUB SERPL-MCNC: 0.5 MG/DL (ref 0.1–1)
BNP SERPL-MCNC: 482 PG/ML (ref 0–99)
BUN SERPL-MCNC: 67 MG/DL (ref 8–23)
BUN SERPL-MCNC: 67 MG/DL (ref 8–23)
CALCIUM SERPL-MCNC: 8.3 MG/DL (ref 8.7–10.5)
CALCIUM SERPL-MCNC: 8.8 MG/DL (ref 8.7–10.5)
CHLORIDE SERPL-SCNC: 100 MMOL/L (ref 95–110)
CHLORIDE SERPL-SCNC: 105 MMOL/L (ref 95–110)
CO2 SERPL-SCNC: 15 MMOL/L (ref 23–29)
CO2 SERPL-SCNC: 21 MMOL/L (ref 23–29)
CREAT SERPL-MCNC: 4.9 MG/DL (ref 0.5–1.4)
CREAT SERPL-MCNC: 5 MG/DL (ref 0.5–1.4)
CTP QC/QA: YES
D DIMER PPP IA.FEU-MCNC: 6.87 MG/L FEU
DIFFERENTIAL METHOD: ABNORMAL
DIFFERENTIAL METHOD: ABNORMAL
EOSINOPHIL # BLD AUTO: 0 K/UL (ref 0–0.5)
EOSINOPHIL # BLD AUTO: 0 K/UL (ref 0–0.5)
EOSINOPHIL NFR BLD: 0 % (ref 0–8)
EOSINOPHIL NFR BLD: 0 % (ref 0–8)
ERYTHROCYTE [DISTWIDTH] IN BLOOD BY AUTOMATED COUNT: 14.4 % (ref 11.5–14.5)
ERYTHROCYTE [DISTWIDTH] IN BLOOD BY AUTOMATED COUNT: 14.6 % (ref 11.5–14.5)
EST. GFR  (NO RACE VARIABLE): 8 ML/MIN/1.73 M^2
EST. GFR  (NO RACE VARIABLE): 8 ML/MIN/1.73 M^2
FIO2: 50 %
GLUCOSE SERPL-MCNC: 111 MG/DL (ref 70–110)
GLUCOSE SERPL-MCNC: 94 MG/DL (ref 70–110)
HCT VFR BLD AUTO: 36.4 % (ref 37–48.5)
HCT VFR BLD AUTO: 38.5 % (ref 37–48.5)
HGB BLD-MCNC: 11.5 G/DL (ref 12–16)
HGB BLD-MCNC: 12.4 G/DL (ref 12–16)
IMM GRANULOCYTES # BLD AUTO: 0.13 K/UL (ref 0–0.04)
IMM GRANULOCYTES # BLD AUTO: 0.17 K/UL (ref 0–0.04)
IMM GRANULOCYTES NFR BLD AUTO: 1 % (ref 0–0.5)
IMM GRANULOCYTES NFR BLD AUTO: 1 % (ref 0–0.5)
LYMPHOCYTES # BLD AUTO: 0.5 K/UL (ref 1–4.8)
LYMPHOCYTES # BLD AUTO: 0.9 K/UL (ref 1–4.8)
LYMPHOCYTES NFR BLD: 3.4 % (ref 18–48)
LYMPHOCYTES NFR BLD: 4.8 % (ref 18–48)
MAGNESIUM SERPL-MCNC: 2.3 MG/DL (ref 1.6–2.6)
MCH RBC QN AUTO: 32.7 PG (ref 27–31)
MCH RBC QN AUTO: 32.8 PG (ref 27–31)
MCHC RBC AUTO-ENTMCNC: 31.6 G/DL (ref 32–36)
MCHC RBC AUTO-ENTMCNC: 32.2 G/DL (ref 32–36)
MCV RBC AUTO: 102 FL (ref 82–98)
MCV RBC AUTO: 103 FL (ref 82–98)
MONOCYTES # BLD AUTO: 0.5 K/UL (ref 0.3–1)
MONOCYTES # BLD AUTO: 0.5 K/UL (ref 0.3–1)
MONOCYTES NFR BLD: 2.8 % (ref 4–15)
MONOCYTES NFR BLD: 3.9 % (ref 4–15)
NEUTROPHILS # BLD AUTO: 12.1 K/UL (ref 1.8–7.7)
NEUTROPHILS # BLD AUTO: 16.3 K/UL (ref 1.8–7.7)
NEUTROPHILS NFR BLD: 91.2 % (ref 38–73)
NEUTROPHILS NFR BLD: 91.5 % (ref 38–73)
NRBC BLD-RTO: 0 /100 WBC
NRBC BLD-RTO: 0 /100 WBC
PCO2 BLDA: 41 MMHG (ref 35–45)
PH SMN: 7.36 [PH] (ref 7.35–7.45)
PHOSPHATE SERPL-MCNC: 5.2 MG/DL (ref 2.7–4.5)
PLATELET # BLD AUTO: 194 K/UL (ref 150–450)
PLATELET # BLD AUTO: 237 K/UL (ref 150–450)
PMV BLD AUTO: 9.7 FL (ref 9.2–12.9)
PMV BLD AUTO: 9.9 FL (ref 9.2–12.9)
PO2 BLDA: 186 MMHG (ref 80–100)
POC BASE DEFICIT: -2.5 MMOL/L (ref -2–2)
POC HCO3: 22.9 MMOL/L (ref 24–28)
POC PERFORMED BY: ABNORMAL
POC SATURATED O2: 99.6 % (ref 95–100)
POTASSIUM SERPL-SCNC: 5.7 MMOL/L (ref 3.5–5.1)
POTASSIUM SERPL-SCNC: 5.9 MMOL/L (ref 3.5–5.1)
PROT SERPL-MCNC: 7.3 G/DL (ref 6–8.4)
RBC # BLD AUTO: 3.52 M/UL (ref 4–5.4)
RBC # BLD AUTO: 3.78 M/UL (ref 4–5.4)
SARS-COV-2 RDRP RESP QL NAA+PROBE: NEGATIVE
SODIUM SERPL-SCNC: 137 MMOL/L (ref 136–145)
SODIUM SERPL-SCNC: 138 MMOL/L (ref 136–145)
SPECIMEN SOURCE: ABNORMAL
TROPONIN I SERPL DL<=0.01 NG/ML-MCNC: 0.1 NG/ML (ref 0–0.03)
TROPONIN I SERPL DL<=0.01 NG/ML-MCNC: 0.1 NG/ML (ref 0–0.03)
WBC # BLD AUTO: 13.2 K/UL (ref 3.9–12.7)
WBC # BLD AUTO: 17.8 K/UL (ref 3.9–12.7)

## 2023-10-08 PROCEDURE — 94761 N-INVAS EAR/PLS OXIMETRY MLT: CPT | Mod: HCNC

## 2023-10-08 PROCEDURE — 85379 FIBRIN DEGRADATION QUANT: CPT | Mod: HCNC | Performed by: FAMILY MEDICINE

## 2023-10-08 PROCEDURE — 83880 ASSAY OF NATRIURETIC PEPTIDE: CPT | Mod: HCNC | Performed by: EMERGENCY MEDICINE

## 2023-10-08 PROCEDURE — 99900035 HC TECH TIME PER 15 MIN (STAT): Mod: HCNC

## 2023-10-08 PROCEDURE — 80053 COMPREHEN METABOLIC PANEL: CPT | Mod: HCNC | Performed by: EMERGENCY MEDICINE

## 2023-10-08 PROCEDURE — 93010 EKG 12-LEAD: ICD-10-PCS | Mod: HCNC,,, | Performed by: INTERNAL MEDICINE

## 2023-10-08 PROCEDURE — 93010 ELECTROCARDIOGRAM REPORT: CPT | Mod: HCNC,,, | Performed by: INTERNAL MEDICINE

## 2023-10-08 PROCEDURE — 85025 COMPLETE CBC W/AUTO DIFF WBC: CPT | Mod: HCNC | Performed by: EMERGENCY MEDICINE

## 2023-10-08 PROCEDURE — 84484 ASSAY OF TROPONIN QUANT: CPT | Mod: HCNC | Performed by: EMERGENCY MEDICINE

## 2023-10-08 PROCEDURE — 63600175 PHARM REV CODE 636 W HCPCS: Mod: HCNC | Performed by: HOSPITALIST

## 2023-10-08 PROCEDURE — 25000003 PHARM REV CODE 250: Mod: HCNC | Performed by: REGISTERED NURSE

## 2023-10-08 PROCEDURE — 87635 SARS-COV-2 COVID-19 AMP PRB: CPT | Mod: HCNC | Performed by: EMERGENCY MEDICINE

## 2023-10-08 PROCEDURE — 80048 BASIC METABOLIC PNL TOTAL CA: CPT | Mod: HCNC,XB | Performed by: REGISTERED NURSE

## 2023-10-08 PROCEDURE — 25000003 PHARM REV CODE 250: Mod: HCNC | Performed by: FAMILY MEDICINE

## 2023-10-08 PROCEDURE — 27100171 HC OXYGEN HIGH FLOW UP TO 24 HOURS: Mod: HCNC

## 2023-10-08 PROCEDURE — 93005 ELECTROCARDIOGRAM TRACING: CPT | Mod: HCNC

## 2023-10-08 PROCEDURE — 84100 ASSAY OF PHOSPHORUS: CPT | Mod: HCNC | Performed by: REGISTERED NURSE

## 2023-10-08 PROCEDURE — 99285 EMERGENCY DEPT VISIT HI MDM: CPT | Mod: 25,HCNC

## 2023-10-08 PROCEDURE — 36415 COLL VENOUS BLD VENIPUNCTURE: CPT | Mod: HCNC | Performed by: FAMILY MEDICINE

## 2023-10-08 PROCEDURE — 21400001 HC TELEMETRY ROOM: Mod: HCNC

## 2023-10-08 PROCEDURE — 83735 ASSAY OF MAGNESIUM: CPT | Mod: HCNC | Performed by: REGISTERED NURSE

## 2023-10-08 PROCEDURE — 80100016 HC MAINTENANCE HEMODIALYSIS: Mod: HCNC

## 2023-10-08 PROCEDURE — 25000003 PHARM REV CODE 250: Mod: HCNC | Performed by: STUDENT IN AN ORGANIZED HEALTH CARE EDUCATION/TRAINING PROGRAM

## 2023-10-08 PROCEDURE — 63600175 PHARM REV CODE 636 W HCPCS: Mod: HCNC | Performed by: REGISTERED NURSE

## 2023-10-08 PROCEDURE — 85025 COMPLETE CBC W/AUTO DIFF WBC: CPT | Mod: 91,HCNC | Performed by: REGISTERED NURSE

## 2023-10-08 PROCEDURE — 27000190 HC CPAP FULL FACE MASK W/VALVE: Mod: HCNC

## 2023-10-08 PROCEDURE — 94660 CPAP INITIATION&MGMT: CPT | Mod: HCNC

## 2023-10-08 PROCEDURE — 82803 BLOOD GASES ANY COMBINATION: CPT | Mod: HCNC

## 2023-10-08 PROCEDURE — 36600 WITHDRAWAL OF ARTERIAL BLOOD: CPT | Mod: HCNC

## 2023-10-08 RX ORDER — MUPIROCIN 20 MG/G
OINTMENT TOPICAL 2 TIMES DAILY
Status: DISCONTINUED | OUTPATIENT
Start: 2023-10-08 | End: 2023-10-09 | Stop reason: HOSPADM

## 2023-10-08 RX ORDER — HEPARIN SODIUM 5000 [USP'U]/ML
5000 INJECTION, SOLUTION INTRAVENOUS; SUBCUTANEOUS EVERY 8 HOURS
Status: DISCONTINUED | OUTPATIENT
Start: 2023-10-08 | End: 2023-10-09 | Stop reason: HOSPADM

## 2023-10-08 RX ORDER — LORAZEPAM 0.5 MG/1
0.5 TABLET ORAL EVERY 8 HOURS PRN
Status: DISCONTINUED | OUTPATIENT
Start: 2023-10-08 | End: 2023-10-09 | Stop reason: HOSPADM

## 2023-10-08 RX ORDER — BUSPIRONE HYDROCHLORIDE 5 MG/1
10 TABLET ORAL 2 TIMES DAILY
Status: DISCONTINUED | OUTPATIENT
Start: 2023-10-08 | End: 2023-10-08

## 2023-10-08 RX ORDER — PREDNISONE 20 MG/1
40 TABLET ORAL DAILY
Status: DISCONTINUED | OUTPATIENT
Start: 2023-10-08 | End: 2023-10-09 | Stop reason: HOSPADM

## 2023-10-08 RX ORDER — SODIUM CHLORIDE 9 MG/ML
INJECTION, SOLUTION INTRAVENOUS ONCE
Status: COMPLETED | OUTPATIENT
Start: 2023-10-08 | End: 2023-10-08

## 2023-10-08 RX ORDER — SODIUM CHLORIDE 0.9 % (FLUSH) 0.9 %
10 SYRINGE (ML) INJECTION
Status: DISCONTINUED | OUTPATIENT
Start: 2023-10-08 | End: 2023-10-09 | Stop reason: HOSPADM

## 2023-10-08 RX ORDER — MIRTAZAPINE 7.5 MG/1
7.5 TABLET, FILM COATED ORAL NIGHTLY
Status: DISCONTINUED | OUTPATIENT
Start: 2023-10-08 | End: 2023-10-09 | Stop reason: HOSPADM

## 2023-10-08 RX ORDER — MIDODRINE HYDROCHLORIDE 5 MG/1
10 TABLET ORAL
Status: DISCONTINUED | OUTPATIENT
Start: 2023-10-08 | End: 2023-10-09 | Stop reason: HOSPADM

## 2023-10-08 RX ORDER — BUSPIRONE HYDROCHLORIDE 5 MG/1
10 TABLET ORAL
Status: DISCONTINUED | OUTPATIENT
Start: 2023-10-09 | End: 2023-10-09 | Stop reason: HOSPADM

## 2023-10-08 RX ORDER — ACETAMINOPHEN 325 MG/1
650 TABLET ORAL EVERY 4 HOURS PRN
Status: DISCONTINUED | OUTPATIENT
Start: 2023-10-08 | End: 2023-10-09 | Stop reason: HOSPADM

## 2023-10-08 RX ORDER — HYDROCODONE BITARTRATE AND ACETAMINOPHEN 10; 325 MG/1; MG/1
1 TABLET ORAL 3 TIMES DAILY PRN
Status: DISCONTINUED | OUTPATIENT
Start: 2023-10-08 | End: 2023-10-09 | Stop reason: HOSPADM

## 2023-10-08 RX ORDER — TALC
6 POWDER (GRAM) TOPICAL NIGHTLY PRN
Status: DISCONTINUED | OUTPATIENT
Start: 2023-10-08 | End: 2023-10-09 | Stop reason: HOSPADM

## 2023-10-08 RX ORDER — MEGESTROL ACETATE 20 MG/1
20 TABLET ORAL 2 TIMES DAILY
Status: DISCONTINUED | OUTPATIENT
Start: 2023-10-08 | End: 2023-10-09 | Stop reason: HOSPADM

## 2023-10-08 RX ORDER — SEVELAMER CARBONATE 800 MG/1
800 TABLET, FILM COATED ORAL
Status: DISCONTINUED | OUTPATIENT
Start: 2023-10-08 | End: 2023-10-09 | Stop reason: HOSPADM

## 2023-10-08 RX ORDER — IPRATROPIUM BROMIDE AND ALBUTEROL SULFATE 2.5; .5 MG/3ML; MG/3ML
3 SOLUTION RESPIRATORY (INHALATION) EVERY 8 HOURS
Status: DISCONTINUED | OUTPATIENT
Start: 2023-10-08 | End: 2023-10-08

## 2023-10-08 RX ORDER — FUROSEMIDE 40 MG/1
80 TABLET ORAL 2 TIMES DAILY
Status: DISCONTINUED | OUTPATIENT
Start: 2023-10-08 | End: 2023-10-09 | Stop reason: HOSPADM

## 2023-10-08 RX ORDER — SODIUM CHLORIDE 9 MG/ML
INJECTION, SOLUTION INTRAVENOUS
Status: DISCONTINUED | OUTPATIENT
Start: 2023-10-08 | End: 2023-10-09 | Stop reason: HOSPADM

## 2023-10-08 RX ORDER — IPRATROPIUM BROMIDE AND ALBUTEROL SULFATE 2.5; .5 MG/3ML; MG/3ML
3 SOLUTION RESPIRATORY (INHALATION) EVERY 4 HOURS PRN
Status: DISCONTINUED | OUTPATIENT
Start: 2023-10-08 | End: 2023-10-09 | Stop reason: HOSPADM

## 2023-10-08 RX ADMIN — SODIUM CHLORIDE: 0.9 INJECTION, SOLUTION INTRAVENOUS at 11:10

## 2023-10-08 RX ADMIN — MUPIROCIN: 20 OINTMENT TOPICAL at 08:10

## 2023-10-08 RX ADMIN — HYDROCODONE BITARTRATE AND ACETAMINOPHEN 1 TABLET: 10; 325 TABLET ORAL at 07:10

## 2023-10-08 RX ADMIN — SEVELAMER CARBONATE 800 MG: 800 TABLET, FILM COATED ORAL at 11:10

## 2023-10-08 RX ADMIN — FUROSEMIDE 80 MG: 40 TABLET ORAL at 08:10

## 2023-10-08 RX ADMIN — MEGESTROL ACETATE 20 MG: 20 TABLET ORAL at 08:10

## 2023-10-08 RX ADMIN — HYDROCODONE BITARTRATE AND ACETAMINOPHEN 1 TABLET: 10; 325 TABLET ORAL at 08:10

## 2023-10-08 RX ADMIN — PREDNISONE 40 MG: 20 TABLET ORAL at 08:10

## 2023-10-08 RX ADMIN — HEPARIN SODIUM 5000 UNITS: 5000 INJECTION INTRAVENOUS; SUBCUTANEOUS at 09:10

## 2023-10-08 RX ADMIN — SODIUM ZIRCONIUM CYCLOSILICATE 10 G: 5 POWDER, FOR SUSPENSION ORAL at 03:10

## 2023-10-08 RX ADMIN — LORAZEPAM 0.5 MG: 0.5 TABLET ORAL at 11:10

## 2023-10-08 RX ADMIN — MIRTAZAPINE 7.5 MG: 7.5 TABLET ORAL at 08:10

## 2023-10-08 RX ADMIN — HEPARIN SODIUM 5000 UNITS: 5000 INJECTION INTRAVENOUS; SUBCUTANEOUS at 06:10

## 2023-10-08 RX ADMIN — Medication 6 MG: at 11:10

## 2023-10-08 RX ADMIN — SEVELAMER CARBONATE 800 MG: 800 TABLET, FILM COATED ORAL at 08:10

## 2023-10-08 RX ADMIN — MIDODRINE HYDROCHLORIDE 10 MG: 5 TABLET ORAL at 11:10

## 2023-10-08 RX ADMIN — MIDODRINE HYDROCHLORIDE 10 MG: 5 TABLET ORAL at 08:10

## 2023-10-08 RX ADMIN — MIDODRINE HYDROCHLORIDE 10 MG: 5 TABLET ORAL at 04:10

## 2023-10-08 RX ADMIN — SEVELAMER CARBONATE 800 MG: 800 TABLET, FILM COATED ORAL at 04:10

## 2023-10-08 NOTE — PLAN OF CARE
Lung fields clearer, no courseness, diminished pt still has inc work of breathing with movement  Transferred to dialysis unit with transport and me, on monitor and 3lnc, 02     took her purse, with wallet,  clothes, inogen 02 tank, phone , , to the room, will keep,  her 2 silver chains and bracelet, taken by her , will keep the 2 gold colored rings

## 2023-10-08 NOTE — CONSULTS
[facility] Consult Note    Primary Attending Physician: Dr. Ibrahim  Consultant Attending: Dr. Interiano  Consultant Fellow:     Reason for Consult:   COPD Exac.     Subjective:      History of Present Illness:  Katie Quevedo is a 78 y.o. female with pmh of possible COPD, chf, PE, seizure disorder, melanoma, and ESRD (hd mwf). She presented to the ED via EMS for dyspnea x2 days. Discharged one week ago for LLL community acquired PNA with steroids and antibiotics. One week ago presented for increasing SOB, productive cough, and increasing home oxygen requirements, chest x-ray significant for BL pleural effusions, left greater than the right.On 3L NC at home, sating upper 80s when EMS arrived. Next HD session scheduled Monday, completed session on Friday with no issues. Per EMS, no response to Duo nebs in route to ED.     Of note, rapid response during previous hospital admission, patient was hypotensive. Discovered patient used THC gummies during admission.        Past Medical History:  Past Medical History:   Diagnosis Date    Acute congestive heart failure 02/10/2020    Anemia     Bilateral renal cysts     Cataract     Chronic LBP 7/26/2012    Chronic pain     CKD (chronic kidney disease), stage IV     Colon polyp 2013    COPD (chronic obstructive pulmonary disease)     Dehydration     Encounter for blood transfusion     HTN (hypertension)     Lumbar spondylosis     Melanoma     of the lip    Metabolic bone disease     Migraines, neuralgic     Osteoporosis     Primary osteoarthritis of both knees     s/p Rt TKA    Pulmonary embolism with infarction     Seizures 1972    x1 only    Subdeltoid bursitis, L>R. 9/27/2012    Ulcer     Vitamin D deficiency disease        Past Surgical History:  Past Surgical History:   Procedure Laterality Date    BLADDER SUSPENSION      CATARACT EXTRACTION  11/18/13    left eye    CERVICAL LAMINECTOMY      x3, fusion x1    COLONOSCOPY  2009     DECLOTTING OF ARTERIOVENOUS GRAFT Left 1/3/2022    Procedure: [id number];  Surgeon: Lindsey Louie MD;  Location: [facility] OR;  Service: Vascular;  Laterality: Left;    DECLOTTING OF ARTERIOVENOUS GRAFT Left 2/8/2022    Procedure: [id number];  Surgeon: Lindsey Louie MD;  Location: [facility] OR;  Service: Vascular;  Laterality: Left;    DECLOTTING OF ARTERIOVENOUS GRAFT Left 4/8/2022    Procedure: [id number];  Surgeon: Lindsey Louie MD;  Location: [facility] OR;  Service: Vascular;  Laterality: Left;    FISTULOGRAM N/A 2/27/2020    Procedure: Fistulogram;  Surgeon: Noe Benitez MD;  Location: [facility] CATH LAB/EP;  Service: Cardiology;  Laterality: N/A;    HYSTERECTOMY      JOINT REPLACEMENT  2001    total right knee     LUMBAR LAMINECTOMY      x 3, fusion x1    OOPHORECTOMY      PERIPHERAL ANGIOGRAPHY N/A 3/9/2020    Procedure: Peripheral angiography;  Surgeon: Jacinto Henriquez MD;  Location: [facility] CATH LAB/EP;  Service: Cardiology;  Laterality: N/A;    PLACEMENT OF ARTERIOVENOUS GRAFT Left 1/21/2020    Procedure: INSERTION, GRAFT, ARTERIOVENOUS;  Surgeon: Lindsey Louie MD;  Location: [facility] OR;  Service: General;  Laterality: Left;    PLACEMENT OF ARTERIOVENOUS GRAFT Right 7/22/2022    Procedure: INSERTION, GRAFT, ARTERIOVENOUS;  Surgeon: Lindsey Louie MD;  Location: [facility] OR;  Service: General;  Laterality: Right;    PLACEMENT OF DUAL-LUMEN VASCULAR CATHETER Right 4/16/2022    Procedure: INSERTION-CATHETER-RICKI;  Surgeon: Lindsey Louie MD;  Location: [facility] OR;  Service: General;  Laterality: Right;    THROMBECTOMY Left 2/3/2020    Procedure: THROMBECTOMY;  Surgeon: Lindsey Louie MD;  Location: [facility] OR;  Service: General;  Laterality: Left;    THROMBECTOMY  3/9/2020    Procedure: Thrombectomy;  Surgeon: Jacinto Henriquez MD;  Location: [facility] CATH LAB/EP;  Service: Cardiology;;    THROMBECTOMY Left 4/7/2022    Procedure: THROMBECTOMY;  Surgeon: Lindsey MILLER  MD Liban;  Location: [facility] OR;  Service: General;  Laterality: Left;       Allergies:  Review of patient's allergies indicates:   Allergen Reactions    Aspirin      Other reaction(s): hx of ulcers    Tetracycline Swelling     Other reaction(s): Swelling    Penicillins Rash     Other reaction(s): Hives  Other reaction(s): Rash  Other reaction(s): Rash  Other reaction(s): Hives       Medications:   In-Hospital Scheduled Medications:   [START ON 10/9/2023] busPIRone  10 mg Oral Once per day on Mon Wed Fri    [START ON 10/9/2023] busPIRone  10 mg Oral Once per day on Mon Wed Fri    furosemide  80 mg Oral BID    heparin (porcine)  5,000 Units Subcutaneous Q8H    megestroL  20 mg Oral BID    midodrine  10 mg Oral TID WM    mirtazapine  7.5 mg Oral QHS    mupirocin   Nasal BID    predniSONE  40 mg Oral Daily    sevelamer carbonate  800 mg Oral TID WM    sodium zirconium cyclosilicate  5 g Oral Once      In-Hospital PRN Medications:  acetaminophen, albuterol-ipratropium, HYDROcodone-acetaminophen, LORazepam, sodium chloride 0.9%   In-Hospital IV Infusion Medications:     Home Medications:  Prior to Admission medications    Medication Sig Start Date End Date Taking? Authorizing Provider   albuterol (PROVENTIL/VENTOLIN HFA) 90 mcg/actuation inhaler Inhale 2 puffs into the lungs every 6 (six) hours as needed for Wheezing. Rescue 6/29/23   Giancarlo Rust MD   albuterol-ipratropium (DUO-NEB) 2.5 mg-0.5 mg/3 mL nebulizer solution Take 3 mLs by nebulization every 6 (six) hours as needed for Shortness of Breath. Rescue 6/29/23 6/28/24  Giancarlo Rust MD   azelastine (ASTELIN) 137 mcg (0.1 %) nasal spray 1 spray (137 mcg total) by Nasal route 2 (two) times daily as needed for Rhinitis. 10/1/23 9/30/24  Moses Hernández MD   B complex-vitamin C-folic acid (RENAL-RADHA) 0.8 mg Tab Take 0.8 mg by mouth every Mon, Wed, Fri. 8/28/23   Provider, Historical   benzonatate (TESSALON) 100 MG capsule Take 1 capsule (100  mg total) by mouth 2 (two) times daily as needed for Cough.  Patient not taking: Reported on 10/3/2023 6/29/23   Giancarlo Rust MD   busPIRone (BUSPAR) 10 MG tablet Take 10 mg by mouth 2 (two) times daily. On Mondays, Wednesdays, and Fridays 8/28/23   Provider, Historical   cyproheptadine (PERIACTIN) 4 mg tablet Take 1 tablet by mouth 3 (three) times daily as needed. 8/28/23   Provider, Historical   diclofenac sodium (VOLTAREN) 1 % Gel Apply 2 g topically 3 (three) times daily as needed.    Provider, Historical   doxercalciferol (HECTOROL IV) Inject 2 mcg into the vein every Mon, Wed, Fri. 8/23/23 8/21/24  Provider, Historical   fluticasone-umeclidin-vilanter (TRELEGY ELLIPTA) 200-62.5-25 mcg inhaler Inhale 1 puff into the lungs once daily. 6/29/23   Giancarlo Rust MD   furosemide (LASIX) 80 MG tablet Take 1 tablet (80 mg total) by mouth 2 (two) times daily On non dialysis days Tuesday-Thursday- Saturday -Sunday 12/17/22 12/17/23  Aura Diggs MD   HYDROcodone-acetaminophen (NORCO)  mg per tablet Take 1 tablet by mouth 3 (three) times daily as needed for Pain. 9/26/23 10/29/23  Greta Prado MD   LORazepam (ATIVAN) 0.5 MG tablet Take 1 tablet (0.5 mg total) by mouth every 8 (eight) hours as needed for Anxiety (use 1-2 before dialysis for anxiety, prn bedtime). 8/18/23 11/16/23  Katharine Urbina NP   megestroL (MEGACE) 20 MG Tab Take 1 tablet (20 mg total) by mouth 2 (two) times daily. 8/23/23 8/22/24  Katharine Urbina NP   midodrine (PROAMATINE) 10 MG tablet Take 1 tablet (10 mg total) by mouth in the morning and 1 tablet (10 mg total) in the evening and 1 tablet (10 mg total) before bedtime. 6/23/23      mirtazapine (REMERON) 7.5 MG Tab Take 1 tablet (7.5 mg total) by mouth every evening. 6/6/23 6/5/24  Kingston Verduzco MD   ondansetron 4 mg/2 mL Soln  2/8/22   Provider, Historical   sevelamer carbonate (RENVELA) 800 mg Tab Take 1 tablet by mouth 3 times a day 7/26/23    "Feng Montemayor MD   sodium chloride 7% 7 % nebulizer solution Take 4 mLs by nebulization 2 (two) times a day. 23   Giancarlo Rust MD       Family History:  Family History   Problem Relation Age of Onset    Arthritis Mother     Stroke Mother     Hypertension Father     Cancer Father     Cataracts Father     Diabetes Maternal Aunt     Hypertension Maternal Grandfather     Heart disease Maternal Grandfather     Heart attack Maternal Grandfather     Cataracts Sister     Glaucoma Cousin        Social History:  Social History     Tobacco Use    Smoking status: Former     Types: Cigarettes    Smokeless tobacco: Former     Quit date: 2/3/2015   Substance Use Topics    Alcohol use: Not Currently     Comment: Rare    Drug use: No       Review of Systems:  All other systems are reviewed and are negative.     Objective:   Last 24 Hour Vital Signs:  BP  Min: 111/61  Max: 180/74  Temp  Av.3 °F (37.4 °C)  Min: 98.5 °F (36.9 °C)  Max: 100 °F (37.8 °C)  Pulse  Av.9  Min: 95  Max: 126  Resp  Av.5  Min: 19  Max: 44  SpO2  Av.8 %  Min: 97 %  Max: 100 %  Height  Av' 2" (157.5 cm)  Min: 5' 2" (157.5 cm)  Max: 5' 2" (157.5 cm)  Weight  Av.8 kg (96 lb 8.8 oz)  Min: 43.1 kg (95 lb)  Max: 44.5 kg (98 lb 1.7 oz)  No intake/output data recorded.    Physical Examination:  The patient has been examined and the H&P has been reviewed:  Physical Exam  Constitutional:       General: She is not in acute distress.     Appearance: Normal appearance. She is not ill-appearing.   HENT:      Head: Normocephalic.      Nose: No rhinorrhea.   Eyes:      General: No scleral icterus.  Cardiovascular:      Rate and Rhythm: Normal rate.      Heart sounds: Normal heart sounds.   Pulmonary:      Effort: Pulmonary effort is normal.      Breath sounds: No wheezing.   Chest:      Chest wall: No tenderness.   Abdominal:      General: There is no distension.      Tenderness: There is no abdominal tenderness. There is no " guarding or rebound.   Musculoskeletal:         General: No swelling or tenderness.      Right lower leg: No edema.      Left lower leg: No edema.   Skin:     Coloration: Skin is not jaundiced.      Findings: No bruising or erythema.   Neurological:      General: No focal deficit present.      Mental Status: She is alert and oriented to person, place, and time.      Motor: No weakness.   Psychiatric:         Mood and Affect: Mood normal.         Behavior: Behavior normal.         Thought Content: Thought content normal.            Anesthesia/Surgery risks, benefits and alternative options discussed and understood by patient/family.    Laboratory:  Trended Lab Data:  Recent Labs     10/08/23  0039 10/08/23  0405   WBC 17.80* 13.20*   HGB 12.4 11.5*   HCT 38.5 36.4*    194    137   K 5.9* 5.7*    105   CO2 21* 15*   BUN 67* 67*   CREATININE 4.9* 5.0*   GLU 94 111*   BILITOT 0.5  --    AST 27  --    ALT 50*  --    ALKPHOS 52*  --    CALCIUM 8.8 8.3*   ALBUMIN 3.7  --    PROT 7.3  --    MG  --  2.3   PHOS  --  5.2*       Cardiac:   Recent Labs   Lab 10/08/23  0039 10/08/23  0210   TROPONINI 0.098* 0.099*   *  --        Urinalysis:   Lab Results   Component Value Date    LABURIN ESCHERICHIA COLI  >100,000 cfu/ml   (A) 11/08/2022    LABURIN KLEBSIELLA PNEUMONIAE  > 100,000 cfu/ml   (A) 11/08/2022    COLORU Yellow 11/04/2022    SPECGRAV 1.025 11/04/2022    NITRITE Negative 11/04/2022    KETONESU Negative 11/04/2022    UROBILINOGEN 2.0-3.0 (A) 11/04/2022       Microbiology:  Microbiology Results (last 7 days)       ** No results found for the last 168 hours. **            Radiology:  I have personally reviewed the above labs and imaging.    Current Medications:     Infusions:       Scheduled:   [START ON 10/9/2023] busPIRone  10 mg Oral Once per day on Mon Wed Fri    [START ON 10/9/2023] busPIRone  10 mg Oral Once per day on Mon Wed Fri    furosemide  80 mg Oral BID    heparin (porcine)  5,000  Units Subcutaneous Q8H    megestroL  20 mg Oral BID    midodrine  10 mg Oral TID WM    mirtazapine  7.5 mg Oral QHS    mupirocin   Nasal BID    predniSONE  40 mg Oral Daily    sevelamer carbonate  800 mg Oral TID WM    sodium zirconium cyclosilicate  5 g Oral Once        PRN:  acetaminophen, albuterol-ipratropium, HYDROcodone-acetaminophen, LORazepam, sodium chloride 0.9%     Assessment:     Katie Quevedo is a 78 y.o. female with   Patient Active Problem List    Diagnosis Date Noted    Hyperkalemia 09/30/2023    Community acquired pneumonia of left lower lobe of lung 09/29/2023    Goals of care, counseling/discussion 09/29/2023    Sepsis 09/29/2023    Benzodiazepine dependence, continuous 08/18/2023    Chronic obstructive pulmonary disease with acute exacerbation     Coagulation defect, unspecified 04/25/2023    Unspecified mood (affective) disorder 04/25/2023    Aortic atherosclerosis 12/15/2022    Physical deconditioning 12/10/2022    Hypokalemia 12/09/2022    Adjustment reaction with anxiety and depression 11/01/2022    Advance care planning 10/18/2022    severe stage 4 COPD 10/13/2022    Hypotension 10/13/2022    Influenza A virus present 10/11/2022    Clotted dialysis access 01/03/2022    Secondary hyperparathyroidism 03/30/2021    Orthopnea 11/10/2020    SOB (shortness of breath) 11/09/2020    Acute on chronic heart failure 11/09/2020    Pleural effusion 11/09/2020    Medication management 05/28/2020    Dialysis AV fistula malfunction, sequela 03/09/2020    Diarrhea 02/28/2020    Palliative care encounter 02/26/2020    ESRD (end stage renal disease) on dialysis 02/19/2020    Diastolic dysfunction, left ventricle 02/04/2020    Clotted renal dialysis arteriovenous graft 02/03/2020    Nausea 01/14/2020    Pulmonary cachexia due to COPD 12/18/2019    Chronic diastolic heart failure 10/30/2019    Lipoma of torso 10/10/2019    Chronic respiratory failure with hypoxia, on home O2 therapy 07/18/2019    Shortness of  breath 07/17/2019    Pericardial effusion 07/05/2019    Pleural effusion, left     Acute on chronic respiratory failure with hypoxia 03/05/2018    History of colon polyps 02/06/2018    Atrophy of left kidney 01/25/2018    Kidney cysts 01/25/2018    Iron deficiency anemia 01/22/2018    Essential hypertension 01/22/2018    Osteoporosis 09/21/2015    Osteoarthritis, hip, bilateral 10/27/2014    Lumbar radiculopathy, BLE 10/27/2014    Cervical radiculopathy, BUE 10/27/2014    Biceps tendonitis 01/06/2014    Rotator cuff tear, right 10/18/2013    Nuclear sclerosis - Both Eyes 08/08/2013    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 Alert and oriented. CTM.    CVS-  #HTN: normotensive on arrival to ED. CTM  Home regimen:   Resume as indicated.     RESPIRATORY   #COPD exacerbation likely 2/2 pulmonary infection - History of COPD- no documented PFTs.   - Duo nebs q-4  -Prednisone QID.   #PNA: Admitted and discharged one week prior for CAP. Presented with increasing home o2 requirements, increasing dyspnea, and productive cough.   - chest  x ray: no significant changes. Hyperinflated lungs, emphysematous changes. No new focal consolidations. There are small left greater than right bilateral pleural, seen on previous imaging.     ENDOCRINE - no acute concerns    GI -   -No acute issues. Cardiac diet.     RENAL   #ESRD- On dialysis MWF. #Hyperkalemic on presentation without  EKG changes, given Lokelma. Consult to nephrology placed.   - CTM electrolyte derangements.   - Lokelma PRN for hyperkalemia  - HD scheduled for today.   #hyperphosphatemia- continue  home Sevelamer carbonate 800 mg TID.     HEME/ONCOLOGY   #Macrocytic anemia.   Home meds : Bcomplex vitamin C-folic acid .8mg.   No signs of active bleeding. CTM  - Folic acid and B-12 levels to be checked.     #Leukocytosis- likely secondary to steroids. CTM for other sources of leukocytosis.     PSYCH - no acute concerns.     Thank you for allowing us to participate in the care of this patient. Please contact me if you have any questions regarding this consult.    Emily Beltran MD PGY-1  [facility]     Pt seen and examined with Pulmonary/Critical Care team and this note reviewed and validated with the following additional comments:    THere is a moderate size pleural effusion with B-lines on the left and a smaller one on the right.  The left-sided effusion has been present since at least 2019.  It was sampled and thought to be a lymphocytic exudate but it waxes and wanes in size suggesting that it is hydrostatic in nature. Mrs Quevedo doesn't have any dependent edema in spite of the fact that her BNP is chronically elevated.  It is over 400 on this admit. The effusions and likely a little pulmonary edema combined with the COPD is enough to explain her dyspnea.        Medical Decision Making (MDM) was complex.  The number and complexity of problems addressed was 4 (COPD, pleural effusions, ESRD, frailty)  The amount and complexity of data reviewed was high.  The risk of complications and/or morbidity/mortality was high.  The tests ordered were none.  I communicated with the following providers Nephrology.  Time spent in the care of this patient was 30 minutes    Evan Caban MD  Phone [phone]

## 2023-10-08 NOTE — CARE UPDATE
Awake and alert, feels better.  Admitted with acute on chronic respiratory failure requiring continuous BiPAP-weaned to 3 L of oxygen-back to baseline  Hyperkalemia-shift with Lokelma  D-dimer elevated-CTA chest versus V/Q scan given renal function  Step-down floor

## 2023-10-08 NOTE — ASSESSMENT & PLAN NOTE
Potassium 5.9 on admit  Given Lokelma, repeat in a.m. may need additional dosing  Next HD is scheduled next for Monday     Anemia

## 2023-10-08 NOTE — NURSING
Arrived on the unit from dialysis on a bed. Spouse @ bedside, Perma cath On the RCW dated, CDI, Fistula on the left upper arm open to air. Has a cough, and lung sounds wet. RF iv CDI. Plan of care explained, safety measures in place, call bed in reach, vital signs per flow sheet, instructed to call for assistance verbalized understanding.   Additional Notes: Discussed removal options, but expressed that it may be considered cosmetic and that the fee is PATOS. [employer] pricing sheet given.

## 2023-10-08 NOTE — PLAN OF CARE
Pt to be transferred to the floor, dialysis here, spoke with Dr bailey, pt to be dialyzed here, to monitor bp    Change in plans pt to be transferred now, and can dialyzed in the dialysis unit  Pt did void once 300cc, ext cath in place

## 2023-10-08 NOTE — PLAN OF CARE
Spoke with Dr Don re the Nuclear scan , no one on call for the dept, will need to be done tomorrow, states this is okay

## 2023-10-08 NOTE — PROCEDURES
"Patient seen and examined on HD tolerating well. No complains. So far Monitor BP keep MAP>65 UF as tolerated   /61   Pulse (!) 112   Temp 98.1 °F (36.7 °C) (Oral)   Resp (!) 56   Ht 5' 2" (1.575 m)   Wt 44.5 kg (98 lb 1.7 oz)   LMP  (LMP Unknown)   SpO2 100%   Breastfeeding No   BMI 17.94 kg/m²     With any question please call answering service [phone]  Ramo Da Silva MD    [facility]   NEIL Diggs MD,   MD CURTIS Benoit MD E. V. Harmon, NP    [address]  [place], [address]   "

## 2023-10-08 NOTE — H&P
"[facility] Medicine  History & Physical    Patient Name: Katie Quevedo  MRN: [id number]  Patient Class: IP- Inpatient  Admission Date: 10/8/2023  Attending Physician: Jennifer Bowles   Primary Care Provider: Kingston Verduzco MD         Patient information was obtained from patient and ER records.     Subjective:     Principal Problem:<principal problem not specified>    Chief Complaint:   Chief Complaint   Patient presents with    Shortness of Breath     Per EMS family reports SOB x 2 days, but "sick" for one week.  Patient with low oxygen saturations on scene.  Given duo neb prior to arrival with little effect.  Patient presents using accessory muscles, mouth breathing, and speaking one word sentences.        HPI: Patient is a 78-year-old female with history of COPD on 3 L O2 chronically at home, ESRD on HD MWF presenting to ED with shortness of breath, difficulty breathing for the last 2 days.  Patient was discharged 7 days ago from this facility for left lower lobe pneumonia, discharged on steroids and antibiotics.  Patient states that she started feeling poorly about 2 days ago.  Did not want to come to the hospital.  EMS reports patient at home lb BMI and upper 80s of SpO2 while on 3 L nasal cannula with respiratory distress.  Was given DuoNeb without improvement in ED patient was placed on by which her dramatically.  CBC with chemistry consistent with ESRD also with hyperkalemia 5.9.  Patient was given Lokelma.  Next plan HD is on Monday.  Chest x-ray benign.  BNP and troponin both negative.  Patient will be admitted to Hospital Medicine.  Patient's O2 has improve currently off BiPAP tolerating 3 L nasal cannula without shortness of breath.      Past Medical History:   Diagnosis Date    Acute congestive heart failure 02/10/2020    Anemia     Bilateral renal cysts     Cataract     Chronic LBP 7/26/2012    Chronic pain     CKD (chronic kidney disease), stage IV     Colon " polyp 2013    COPD (chronic obstructive pulmonary disease)     Dehydration     Encounter for blood transfusion     HTN (hypertension)     Lumbar spondylosis     Melanoma     of the lip    Metabolic bone disease     Migraines, neuralgic     Osteoporosis     Primary osteoarthritis of both knees     s/p Rt TKA    Pulmonary embolism with infarction     Seizures 1972    x1 only    Subdeltoid bursitis, L>R. 9/27/2012    Ulcer     Vitamin D deficiency disease        Past Surgical History:   Procedure Laterality Date    BLADDER SUSPENSION      CATARACT EXTRACTION  11/18/13    left eye    CERVICAL LAMINECTOMY      x3, fusion x1    COLONOSCOPY  2009    DECLOTTING OF ARTERIOVENOUS GRAFT Left 1/3/2022    Procedure: [id number];  Surgeon: Lindsey Louie MD;  Location: [facility] OR;  Service: Vascular;  Laterality: Left;    DECLOTTING OF ARTERIOVENOUS GRAFT Left 2/8/2022    Procedure: [id number];  Surgeon: Lindsey Louie MD;  Location: [facility] OR;  Service: Vascular;  Laterality: Left;    DECLOTTING OF ARTERIOVENOUS GRAFT Left 4/8/2022    Procedure: [id number];  Surgeon: Lindsey Louie MD;  Location: [facility] OR;  Service: Vascular;  Laterality: Left;    FISTULOGRAM N/A 2/27/2020    Procedure: Fistulogram;  Surgeon: Noe Benitez MD;  Location: [facility] CATH LAB/EP;  Service: Cardiology;  Laterality: N/A;    HYSTERECTOMY      JOINT REPLACEMENT  2001    total right knee     LUMBAR LAMINECTOMY      x 3, fusion x1    OOPHORECTOMY      PERIPHERAL ANGIOGRAPHY N/A 3/9/2020    Procedure: Peripheral angiography;  Surgeon: Jacinto Henriquez MD;  Location: [facility] CATH LAB/EP;  Service: Cardiology;  Laterality: N/A;    PLACEMENT OF ARTERIOVENOUS GRAFT Left 1/21/2020    Procedure: INSERTION, GRAFT, ARTERIOVENOUS;  Surgeon: Lindsey Louie MD;  Location: [facility] OR;  Service: General;  Laterality: Left;    PLACEMENT OF ARTERIOVENOUS GRAFT Right 7/22/2022    Procedure: INSERTION,  GRAFT, ARTERIOVENOUS;  Surgeon: Lindsey Louie MD;  Location: [facility] OR;  Service: General;  Laterality: Right;    PLACEMENT OF DUAL-LUMEN VASCULAR CATHETER Right 4/16/2022    Procedure: INSERTION-CATHETER-RICKI;  Surgeon: Lindsey Louie MD;  Location: [facility] OR;  Service: General;  Laterality: Right;    THROMBECTOMY Left 2/3/2020    Procedure: THROMBECTOMY;  Surgeon: Lindsey Louie MD;  Location: [facility] OR;  Service: General;  Laterality: Left;    THROMBECTOMY  3/9/2020    Procedure: Thrombectomy;  Surgeon: Jacinto Henriquez MD;  Location: [facility] CATH LAB/EP;  Service: Cardiology;;    THROMBECTOMY Left 4/7/2022    Procedure: THROMBECTOMY;  Surgeon: Lindsey Louie MD;  Location: [facility] OR;  Service: General;  Laterality: Left;       Review of patient's allergies indicates:   Allergen Reactions    Aspirin      Other reaction(s): hx of ulcers    Tetracycline Swelling     Other reaction(s): Swelling    Penicillins Rash     Other reaction(s): Hives  Other reaction(s): Rash  Other reaction(s): Rash  Other reaction(s): Hives       No current facility-administered medications on file prior to encounter.     Current Outpatient Medications on File Prior to Encounter   Medication Sig    albuterol (PROVENTIL/VENTOLIN HFA) 90 mcg/actuation inhaler Inhale 2 puffs into the lungs every 6 (six) hours as needed for Wheezing. Rescue    albuterol-ipratropium (DUO-NEB) 2.5 mg-0.5 mg/3 mL nebulizer solution Take 3 mLs by nebulization every 6 (six) hours as needed for Shortness of Breath. Rescue    azelastine (ASTELIN) 137 mcg (0.1 %) nasal spray 1 spray (137 mcg total) by Nasal route 2 (two) times daily as needed for Rhinitis.    B complex-vitamin C-folic acid (RENAL-RADHA) 0.8 mg Tab Take 0.8 mg by mouth every Mon, Wed, Fri.    benzonatate (TESSALON) 100 MG capsule Take 1 capsule (100 mg total) by mouth 2 (two) times daily as needed for Cough. (Patient not taking: Reported on 10/3/2023)    busPIRone (BUSPAR)  10 MG tablet Take 10 mg by mouth 2 (two) times daily. On Mondays, Wednesdays, and Fridays    cyproheptadine (PERIACTIN) 4 mg tablet Take 1 tablet by mouth 3 (three) times daily as needed.    diclofenac sodium (VOLTAREN) 1 % Gel Apply 2 g topically 3 (three) times daily as needed.    doxercalciferol (HECTOROL IV) Inject 2 mcg into the vein every Mon, Wed, Fri.    fluticasone-umeclidin-vilanter (TRELEGY ELLIPTA) 200-62.5-25 mcg inhaler Inhale 1 puff into the lungs once daily.    furosemide (LASIX) 80 MG tablet Take 1 tablet (80 mg total) by mouth 2 (two) times daily On non dialysis days Tuesday-Thursday- Saturday -Sunday    HYDROcodone-acetaminophen (NORCO)  mg per tablet Take 1 tablet by mouth 3 (three) times daily as needed for Pain.    LORazepam (ATIVAN) 0.5 MG tablet Take 1 tablet (0.5 mg total) by mouth every 8 (eight) hours as needed for Anxiety (use 1-2 before dialysis for anxiety, prn bedtime).    megestroL (MEGACE) 20 MG Tab Take 1 tablet (20 mg total) by mouth 2 (two) times daily.    midodrine (PROAMATINE) 10 MG tablet Take 1 tablet (10 mg total) by mouth in the morning and 1 tablet (10 mg total) in the evening and 1 tablet (10 mg total) before bedtime.    mirtazapine (REMERON) 7.5 MG Tab Take 1 tablet (7.5 mg total) by mouth every evening.    ondansetron 4 mg/2 mL Soln     sevelamer carbonate (RENVELA) 800 mg Tab Take 1 tablet by mouth 3 times a day    sodium chloride 7% 7 % nebulizer solution Take 4 mLs by nebulization 2 (two) times a day.     Family History       Problem Relation (Age of Onset)    Arthritis Mother    Cancer Father    Cataracts Father, Sister    Diabetes Maternal Aunt    Glaucoma Cousin    Heart attack Maternal Grandfather    Heart disease Maternal Grandfather    Hypertension Father, Maternal Grandfather    Stroke Mother          Tobacco Use    Smoking status: Former     Types: Cigarettes    Smokeless tobacco: Former     Quit date: 2/3/2015   Substance and Sexual  Activity    Alcohol use: Not Currently     Comment: Rare    Drug use: No    Sexual activity: Never     Partners: Male     Review of Systems   Respiratory:  Positive for shortness of breath.    All other systems reviewed and are negative.    Objective:     Vital Signs (Most Recent):  Temp: 98.5 °F (36.9 °C) (10/08/23 0253)  Pulse: 102 (10/08/23 0530)  Resp: (!) 31 (10/08/23 0530)  BP: 124/69 (10/08/23 0530)  SpO2: 100 % (10/08/23 0530) Vital Signs (24h Range):  Temp:  [98.5 °F (36.9 °C)-100 °F (37.8 °C)] 98.5 °F (36.9 °C)  Pulse:  [] 102  Resp:  [19-44] 31  SpO2:  [97 %-100 %] 100 %  BP: (111-180)/(56-84) 124/69     Weight: 44.5 kg (98 lb 1.7 oz)  Body mass index is 17.94 kg/m².     Physical Exam  Vitals reviewed.   Constitutional:       Appearance: Normal appearance.   HENT:      Head: Normocephalic.      Mouth/Throat:      Mouth: Mucous membranes are dry.   Eyes:      Pupils: Pupils are equal, round, and reactive to light.   Cardiovascular:      Rate and Rhythm: Normal rate and regular rhythm.      Pulses: Normal pulses.      Heart sounds: Normal heart sounds.   Pulmonary:      Effort: Pulmonary effort is normal.      Breath sounds: Normal breath sounds.   Abdominal:      General: Bowel sounds are normal.      Palpations: Abdomen is soft.   Musculoskeletal:         General: Normal range of motion.      Cervical back: Normal range of motion.   Skin:     General: Skin is warm and dry.      Capillary Refill: Capillary refill takes less than 2 seconds.   Neurological:      General: No focal deficit present.      Mental Status: She is alert and oriented to person, place, and time. Mental status is at baseline.   Psychiatric:         Mood and Affect: Mood normal.         Behavior: Behavior normal.              CRANIAL NERVES     CN III, IV, VI   Pupils are equal, round, and reactive to light.       Significant Labs: All pertinent labs within the past 24 hours have been reviewed.  Recent Lab Results  (Last 5  results in the past 24 hours)        10/08/23  0453   10/08/23  0405   10/08/23  0238   10/08/23  0210   10/08/23  0051        Base Deficit         -2.5  Comment: Value below reference range       Performed By:         [id number]       Specimen source         Arterial       Allens Test         NA       Anion Gap   17             Baso #   0.02             Basophil %   0.2             BUN   67             Calcium   8.3             Chloride   105             CO2   15             Creatinine   5.0             D-Dimer 6.87  Comment: The quantitative D-dimer assay should be used as an aid in   the diagnosis of deep vein thrombosis and pulmonary embolism  in patients with the appropriate presentation and clinical  history. The upper limit of the reference interval and the clinical   cut off   point are identical. Causes of a positive (>0.50 mg/L FEU) D-Dimer   test  include, but are not limited to: DVT, PE, DIC, thrombolytic   therapy, anticoagulant therapy, recent surgery, trauma, or   pregnancy, disseminated malignancy, aortic aneurysm, cirrhosis,  and severe infection. False negative results may occur in   patients with distal DVT.  BHANU^612^^21^  LOT^610^DDiSup^[id number]\DDiBuf^[id number]\DDiReag^[id number]                 Differential Method   Automated             eGFR   8             Eos #   0.0             Eosinophil %   0.0             FiO2         50.0       Glucose   111             Gran # (ANC)   12.1             Gran %   91.5             Hematocrit   36.4             Hemoglobin   11.5             Immature Grans (Abs)   0.13  Comment: Mild elevation in immature granulocytes is non specific and   can be seen in a variety of conditions including stress response,   acute inflammation, trauma and pregnancy. Correlation with other   laboratory and clinical findings is essential.               Immature Granulocytes   1.0             Lymph #   0.5             Lymph %   3.4             Magnesium    2.3             MCH   32.7              MCHC   31.6             MCV   103             Mono #   0.5             Mono %   3.9             MPV   9.7             nRBC   0             Phosphorus Level   5.2             Platelet Count   194             POC HCO3         22.9  Comment: Value below reference range       POC PCO2         41.0       POC PH         7.356       POC PO2         186  Comment: Value above reference range       POC SATURATED O2         99.6       Potassium   5.7              Acceptable     Yes           RBC   3.52             RDW   14.6             SARS-CoV-2 RNA, Amplification, Qual     Negative           Sodium   137             Troponin I       0.099  Comment: The reference interval for Troponin I represents the 99th percentile   cutoff   for our facility and is consistent with 3rd generation assay   performance.           WBC   13.20                                    Significant Imaging: I have reviewed all pertinent imaging results/findings within the past 24 hours.  I have reviewed and interpreted all pertinent imaging results/findings within the past 24 hours.    Assessment/Plan:     Hyperkalemia  Potassium 5.9 on admit  Given Lokelma, repeat in a.m. may need additional dosing  Next HD is scheduled next for Monday      ESRD (end stage renal disease) on dialysis  MWF-last HD on Friday full dialysis completed  Consult nephrology for HD management      Acute on chronic respiratory failure with hypoxia  Patient with Hypoxic Respiratory failure which is Acute on chronic.  she is on home oxygen at Three LPM. Supplemental oxygen was provided and noted- Oxygen Concentration (%):  [35] 35    .   Signs/symptoms of respiratory failure include- tachypnea, increased work of breathing and respiratory distress. Contributing diagnoses includes - COPD Labs and images were reviewed. Patient Has recent ABG, which has been reviewed. Will treat underlying causes and adjust management of respiratory failure as follows-  BiPAP as  needed, titrate down with improvement  Steroids  DuoNebs      VTE Risk Mitigation (From admission, onward)         Ordered     heparin (porcine) injection 5,000 Units  Every 8 hours         10/08/23 0349     IP VTE HIGH RISK PATIENT  Once         10/08/23 0219     Place sequential compression device  Until discontinued         10/08/23 0219              Critical care time spent on the evaluation and treatment of severe organ dysfunction, review of pertinent labs and imaging studies, discussions with consulting providers and discussions with patient/family: 50 minutes.             Denilson Mendoza NP  Department of Hospital Medicine  [facility] - Intensive Care

## 2023-10-08 NOTE — PLAN OF CARE
Awake and alert, states she feels sob with movement, resp 24 uses upper chest, lungs with courseness, left bAses, diminished all lobes, very infrequent cough, 3lnc ap [address] st, no chest pain or sob, no edema noted, dialysis Friday, does not know how they took off, states bp goes down on dialysis.on midodrine, states she does urinate

## 2023-10-08 NOTE — SUBJECTIVE & OBJECTIVE
Past Medical History:   Diagnosis Date    Acute congestive heart failure 02/10/2020    Anemia     Bilateral renal cysts     Cataract     Chronic LBP 7/26/2012    Chronic pain     CKD (chronic kidney disease), stage IV     Colon polyp 2013    COPD (chronic obstructive pulmonary disease)     Dehydration     Encounter for blood transfusion     HTN (hypertension)     Lumbar spondylosis     Melanoma     of the lip    Metabolic bone disease     Migraines, neuralgic     Osteoporosis     Primary osteoarthritis of both knees     s/p Rt TKA    Pulmonary embolism with infarction     Seizures 1972    x1 only    Subdeltoid bursitis, L>R. 9/27/2012    Ulcer     Vitamin D deficiency disease        Past Surgical History:   Procedure Laterality Date    BLADDER SUSPENSION      CATARACT EXTRACTION  11/18/13    left eye    CERVICAL LAMINECTOMY      x3, fusion x1    COLONOSCOPY  2009    DECLOTTING OF ARTERIOVENOUS GRAFT Left 1/3/2022    Procedure: [id number];  Surgeon: Lindsey Louie MD;  Location: [facility] OR;  Service: Vascular;  Laterality: Left;    DECLOTTING OF ARTERIOVENOUS GRAFT Left 2/8/2022    Procedure: RMSCDPBTCF-[id number]-NO;  Surgeon: Lindsey Louie MD;  Location: [facility] OR;  Service: Vascular;  Laterality: Left;    DECLOTTING OF ARTERIOVENOUS GRAFT Left 4/8/2022    Procedure: TUZHNKZOVS-GOSQH-IN;  Surgeon: Lindsey Louie MD;  Location: [facility] OR;  Service: Vascular;  Laterality: Left;    FISTULOGRAM N/A 2/27/2020    Procedure: Fistulogram;  Surgeon: Noe Benitez MD;  Location: [facility] CATH LAB/EP;  Service: Cardiology;  Laterality: N/A;    HYSTERECTOMY      JOINT REPLACEMENT  2001    total right knee     LUMBAR LAMINECTOMY      x 3, fusion x1    OOPHORECTOMY      PERIPHERAL ANGIOGRAPHY N/A 3/9/2020    Procedure: Peripheral angiography;  Surgeon: Jacinto Henriquez MD;  Location: [facility] CATH LAB/EP;  Service: Cardiology;  Laterality: N/A;    PLACEMENT OF ARTERIOVENOUS GRAFT Left 1/21/2020    Procedure:  INSERTION, GRAFT, ARTERIOVENOUS;  Surgeon: Lindsey Louie MD;  Location: [facility] OR;  Service: General;  Laterality: Left;    PLACEMENT OF ARTERIOVENOUS GRAFT Right 7/22/2022    Procedure: INSERTION, GRAFT, ARTERIOVENOUS;  Surgeon: Lindsey Louie MD;  Location: [facility] OR;  Service: General;  Laterality: Right;    PLACEMENT OF DUAL-LUMEN VASCULAR CATHETER Right 4/16/2022    Procedure: INSERTION-CATHETER-RICKI;  Surgeon: Lindsey Louie MD;  Location: [facility] OR;  Service: General;  Laterality: Right;    THROMBECTOMY Left 2/3/2020    Procedure: THROMBECTOMY;  Surgeon: Lindsey Louie MD;  Location: [facility] OR;  Service: General;  Laterality: Left;    THROMBECTOMY  3/9/2020    Procedure: Thrombectomy;  Surgeon: Jacinto Henriquez MD;  Location: [facility] CATH LAB/EP;  Service: Cardiology;;    THROMBECTOMY Left 4/7/2022    Procedure: THROMBECTOMY;  Surgeon: Lindsey Louie MD;  Location: [facility] OR;  Service: General;  Laterality: Left;       Review of patient's allergies indicates:   Allergen Reactions    Aspirin      Other reaction(s): hx of ulcers    Tetracycline Swelling     Other reaction(s): Swelling    Penicillins Rash     Other reaction(s): Hives  Other reaction(s): Rash  Other reaction(s): Rash  Other reaction(s): Hives       No current facility-administered medications on file prior to encounter.     Current Outpatient Medications on File Prior to Encounter   Medication Sig    albuterol (PROVENTIL/VENTOLIN HFA) 90 mcg/actuation inhaler Inhale 2 puffs into the lungs every 6 (six) hours as needed for Wheezing. Rescue    albuterol-ipratropium (DUO-NEB) 2.5 mg-0.5 mg/3 mL nebulizer solution Take 3 mLs by nebulization every 6 (six) hours as needed for Shortness of Breath. Rescue    azelastine (ASTELIN) 137 mcg (0.1 %) nasal spray 1 spray (137 mcg total) by Nasal route 2 (two) times daily as needed for Rhinitis.    B complex-vitamin C-folic acid (RENAL-RADHA) 0.8 mg Tab Take 0.8 mg by mouth every Mon, Wed,  Fri.    benzonatate (TESSALON) 100 MG capsule Take 1 capsule (100 mg total) by mouth 2 (two) times daily as needed for Cough. (Patient not taking: Reported on 10/3/2023)    busPIRone (BUSPAR) 10 MG tablet Take 10 mg by mouth 2 (two) times daily. On Mondays, Wednesdays, and Fridays    cyproheptadine (PERIACTIN) 4 mg tablet Take 1 tablet by mouth 3 (three) times daily as needed.    diclofenac sodium (VOLTAREN) 1 % Gel Apply 2 g topically 3 (three) times daily as needed.    doxercalciferol (HECTOROL IV) Inject 2 mcg into the vein every Mon, Wed, Fri.    fluticasone-umeclidin-vilanter (TRELEGY ELLIPTA) 200-62.5-25 mcg inhaler Inhale 1 puff into the lungs once daily.    furosemide (LASIX) 80 MG tablet Take 1 tablet (80 mg total) by mouth 2 (two) times daily On non dialysis days Tuesday-Thursday- Saturday -Sunday    HYDROcodone-acetaminophen (NORCO)  mg per tablet Take 1 tablet by mouth 3 (three) times daily as needed for Pain.    LORazepam (ATIVAN) 0.5 MG tablet Take 1 tablet (0.5 mg total) by mouth every 8 (eight) hours as needed for Anxiety (use 1-2 before dialysis for anxiety, prn bedtime).    megestroL (MEGACE) 20 MG Tab Take 1 tablet (20 mg total) by mouth 2 (two) times daily.    midodrine (PROAMATINE) 10 MG tablet Take 1 tablet (10 mg total) by mouth in the morning and 1 tablet (10 mg total) in the evening and 1 tablet (10 mg total) before bedtime.    mirtazapine (REMERON) 7.5 MG Tab Take 1 tablet (7.5 mg total) by mouth every evening.    ondansetron 4 mg/2 mL Soln     sevelamer carbonate (RENVELA) 800 mg Tab Take 1 tablet by mouth 3 times a day    sodium chloride 7% 7 % nebulizer solution Take 4 mLs by nebulization 2 (two) times a day.     Family History       Problem Relation (Age of Onset)    Arthritis Mother    Cancer Father    Cataracts Father, Sister    Diabetes Maternal Aunt    Glaucoma Cousin    Heart attack Maternal Grandfather    Heart disease Maternal Grandfather    Hypertension Father, Maternal  Grandfather    Stroke Mother          Tobacco Use    Smoking status: Former     Types: Cigarettes    Smokeless tobacco: Former     Quit date: 2/3/2015   Substance and Sexual Activity    Alcohol use: Not Currently     Comment: Rare    Drug use: No    Sexual activity: Never     Partners: Male     Review of Systems   Respiratory:  Positive for shortness of breath.    All other systems reviewed and are negative.    Objective:     Vital Signs (Most Recent):  Temp: 98.5 °F (36.9 °C) (10/08/23 0253)  Pulse: 102 (10/08/23 0530)  Resp: (!) 31 (10/08/23 0530)  BP: 124/69 (10/08/23 0530)  SpO2: 100 % (10/08/23 0530) Vital Signs (24h Range):  Temp:  [98.5 °F (36.9 °C)-100 °F (37.8 °C)] 98.5 °F (36.9 °C)  Pulse:  [] 102  Resp:  [19-44] 31  SpO2:  [97 %-100 %] 100 %  BP: (111-180)/(56-84) 124/69     Weight: 44.5 kg (98 lb 1.7 oz)  Body mass index is 17.94 kg/m².     Physical Exam  Vitals reviewed.   Constitutional:       Appearance: Normal appearance.   HENT:      Head: Normocephalic.      Mouth/Throat:      Mouth: Mucous membranes are dry.   Eyes:      Pupils: Pupils are equal, round, and reactive to light.   Cardiovascular:      Rate and Rhythm: Normal rate and regular rhythm.      Pulses: Normal pulses.      Heart sounds: Normal heart sounds.   Pulmonary:      Effort: Pulmonary effort is normal.      Breath sounds: Normal breath sounds.   Abdominal:      General: Bowel sounds are normal.      Palpations: Abdomen is soft.   Musculoskeletal:         General: Normal range of motion.      Cervical back: Normal range of motion.   Skin:     General: Skin is warm and dry.      Capillary Refill: Capillary refill takes less than 2 seconds.   Neurological:      General: No focal deficit present.      Mental Status: She is alert and oriented to person, place, and time. Mental status is at baseline.   Psychiatric:         Mood and Affect: Mood normal.         Behavior: Behavior normal.              CRANIAL NERVES     CN III, IV, VI    Pupils are equal, round, and reactive to light.       Significant Labs: All pertinent labs within the past 24 hours have been reviewed.  Recent Lab Results  (Last 5 results in the past 24 hours)        10/08/23  0453   10/08/23  0405   10/08/23  0238   10/08/23  0210   10/08/23  0051        Base Deficit         -2.5  Comment: Value below reference range       Performed By:         [id number]       Specimen source         Arterial       Allens Test         NA       Anion Gap   17             Baso #   0.02             Basophil %   0.2             BUN   67             Calcium   8.3             Chloride   105             CO2   15             Creatinine   5.0             D-Dimer 6.87  Comment: The quantitative D-dimer assay should be used as an aid in   the diagnosis of deep vein thrombosis and pulmonary embolism  in patients with the appropriate presentation and clinical  history. The upper limit of the reference interval and the clinical   cut off   point are identical. Causes of a positive (>0.50 mg/L FEU) D-Dimer   test  include, but are not limited to: DVT, PE, DIC, thrombolytic   therapy, anticoagulant therapy, recent surgery, trauma, or   pregnancy, disseminated malignancy, aortic aneurysm, cirrhosis,  and severe infection. False negative results may occur in   patients with distal DVT.  BHANU^612^^21^  LOT^610^DDiSup^[id number]\DDiBuf^[id number]\DDiReag^[id number]                 Differential Method   Automated             eGFR   8             Eos #   0.0             Eosinophil %   0.0             FiO2         50.0       Glucose   111             Gran # (ANC)   12.1             Gran %   91.5             Hematocrit   36.4             Hemoglobin   11.5             Immature Grans (Abs)   0.13  Comment: Mild elevation in immature granulocytes is non specific and   can be seen in a variety of conditions including stress response,   acute inflammation, trauma and pregnancy. Correlation with other   laboratory and clinical findings is  essential.               Immature Granulocytes   1.0             Lymph #   0.5             Lymph %   3.4             Magnesium    2.3             MCH   32.7             MCHC   31.6             MCV   103             Mono #   0.5             Mono %   3.9             MPV   9.7             nRBC   0             Phosphorus Level   5.2             Platelet Count   194             POC HCO3         22.9  Comment: Value below reference range       POC PCO2         41.0       POC PH         7.356       POC PO2         186  Comment: Value above reference range       POC SATURATED O2         99.6       Potassium   5.7              Acceptable     Yes           RBC   3.52             RDW   14.6             SARS-CoV-2 RNA, Amplification, Qual     Negative           Sodium   137             Troponin I       0.099  Comment: The reference interval for Troponin I represents the 99th percentile   cutoff   for our facility and is consistent with 3rd generation assay   performance.           WBC   13.20                                    Significant Imaging: I have reviewed all pertinent imaging results/findings within the past 24 hours.  I have reviewed and interpreted all pertinent imaging results/findings within the past 24 hours.

## 2023-10-08 NOTE — CONSULTS
Nephrology Consult  H&P      Consult Requested By: Jennifer Bowles*  Reason for Consult: ESRD     SUBJECTIVE:     History of Present Illness:  Katie Quevedo is a 78 y.o.   female who  has a past medical history of  ESRD on HD via [facility] with Dr Aura Diggs, Acute congestive heart failure (02/10/2020), Anemia, Bilateral renal cysts, Cataract, Chronic LBP (7/26/2012), Chronic pain, CKD (chronic kidney disease), stage IV, Colon polyp (2013), COPD (chronic obstructive pulmonary disease), Dehydration, Encounter for blood transfusion, HTN (hypertension), Lumbar spondylosis, Melanoma, Metabolic bone disease, Migraines, neuralgic, Osteoporosis, Primary osteoarthritis of both knees, Pulmonary embolism with infarction, Seizures (1972), Subdeltoid bursitis, L>R. (9/27/2012), Ulcer, and Vitamin D deficiency disease.. The patient presented to the [facility] on 10/8/2023 with a primary complaint of SOB  did  HD Friday but was not able to pull fluids due to BP low        ?    Review of Systems   Constitutional:  Negative for chills, fever and malaise/fatigue.   HENT:  Negative for congestion and sore throat.    Eyes:  Negative for blurred vision, double vision and photophobia.   Respiratory:  Positive for shortness of breath. Negative for cough.    Cardiovascular:  Negative for chest pain, palpitations and leg swelling.   Gastrointestinal:  Negative for abdominal pain, diarrhea, nausea and vomiting.   Genitourinary:  Negative for dysuria and urgency.   Musculoskeletal:  Negative for joint pain and myalgias.   Skin:  Negative for itching and rash.   Neurological:  Negative for dizziness, sensory change, weakness and headaches.   Endo/Heme/Allergies:  Negative for polydipsia. Does not bruise/bleed easily.   Psychiatric/Behavioral:  Negative for depression.        Past Medical History:   Diagnosis Date    Acute congestive heart failure 02/10/2020    Anemia     Bilateral renal cysts     Cataract     Chronic LBP  7/26/2012    Chronic pain     CKD (chronic kidney disease), stage IV     Colon polyp 2013    COPD (chronic obstructive pulmonary disease)     Dehydration     Encounter for blood transfusion     HTN (hypertension)     Lumbar spondylosis     Melanoma     of the lip    Metabolic bone disease     Migraines, neuralgic     Osteoporosis     Primary osteoarthritis of both knees     s/p Rt TKA    Pulmonary embolism with infarction     Seizures 1972    x1 only    Subdeltoid bursitis, L>R. 9/27/2012    Ulcer     Vitamin D deficiency disease      Past Surgical History:   Procedure Laterality Date    BLADDER SUSPENSION      CATARACT EXTRACTION  11/18/13    left eye    CERVICAL LAMINECTOMY      x3, fusion x1    COLONOSCOPY  2009    DECLOTTING OF ARTERIOVENOUS GRAFT Left 1/3/2022    Procedure: [id number]-[id number]-BM;  Surgeon: Lindsey Louie MD;  Location: [facility] OR;  Service: Vascular;  Laterality: Left;    DECLOTTING OF ARTERIOVENOUS GRAFT Left 2/8/2022    Procedure: [id number]-[id number]-KC;  Surgeon: Lindsey Louie MD;  Location: [facility] OR;  Service: Vascular;  Laterality: Left;    DECLOTTING OF ARTERIOVENOUS GRAFT Left 4/8/2022    Procedure: [id number]-XY;  Surgeon: Lindsey Louie MD;  Location: [facility] OR;  Service: Vascular;  Laterality: Left;    FISTULOGRAM N/A 2/27/2020    Procedure: Fistulogram;  Surgeon: Noe Benitez MD;  Location: [facility] CATH LAB/EP;  Service: Cardiology;  Laterality: N/A;    HYSTERECTOMY      JOINT REPLACEMENT  2001    total right knee     LUMBAR LAMINECTOMY      x 3, fusion x1    OOPHORECTOMY      PERIPHERAL ANGIOGRAPHY N/A 3/9/2020    Procedure: Peripheral angiography;  Surgeon: Jacinto Henriquez MD;  Location: [facility] CATH LAB/EP;  Service: Cardiology;  Laterality: N/A;    PLACEMENT OF ARTERIOVENOUS GRAFT Left 1/21/2020    Procedure: INSERTION, GRAFT, ARTERIOVENOUS;  Surgeon: Lindsey Louie MD;  Location: [facility] OR;  Service: General;  Laterality: Left;    PLACEMENT OF ARTERIOVENOUS  GRAFT Right 7/22/2022    Procedure: INSERTION, GRAFT, ARTERIOVENOUS;  Surgeon: Lindsey Louie MD;  Location: [facility] OR;  Service: General;  Laterality: Right;    PLACEMENT OF DUAL-LUMEN VASCULAR CATHETER Right 4/16/2022    Procedure: INSERTION-CATHETER-RICKI;  Surgeon: Lindsey Louie MD;  Location: [facility] OR;  Service: General;  Laterality: Right;    THROMBECTOMY Left 2/3/2020    Procedure: THROMBECTOMY;  Surgeon: Lindsey Louie MD;  Location: [facility] OR;  Service: General;  Laterality: Left;    THROMBECTOMY  3/9/2020    Procedure: Thrombectomy;  Surgeon: Jacinto Henriquez MD;  Location: [facility] CATH LAB/EP;  Service: Cardiology;;    THROMBECTOMY Left 4/7/2022    Procedure: THROMBECTOMY;  Surgeon: Lindsey Louie MD;  Location: [facility] OR;  Service: General;  Laterality: Left;     Family History   Problem Relation Age of Onset    Arthritis Mother     Stroke Mother     Hypertension Father     Cancer Father     Cataracts Father     Diabetes Maternal Aunt     Hypertension Maternal Grandfather     Heart disease Maternal Grandfather     Heart attack Maternal Grandfather     Cataracts Sister     Glaucoma Cousin      Social History     Tobacco Use    Smoking status: Former     Types: Cigarettes    Smokeless tobacco: Former     Quit date: 2/3/2015   Substance Use Topics    Alcohol use: Not Currently     Comment: Rare    Drug use: No       Review of patient's allergies indicates:   Allergen Reactions    Aspirin      Other reaction(s): hx of ulcers    Tetracycline Swelling     Other reaction(s): Swelling    Penicillins Rash     Other reaction(s): Hives  Other reaction(s): Rash  Other reaction(s): Rash  Other reaction(s): Hives            OBJECTIVE:     Vital Signs (Most Recent)  Vitals:    10/08/23 0700 10/08/23 0730 10/08/23 0826 10/08/23 0843   BP: (!) 119/59 135/64 107/61    BP Location:       Patient Position:       Pulse: 95 93  (!) 112   Resp: (!) 27 19 (!) 24 (!) 56   Temp:       TempSrc:       SpO2: 99%  100%  100%   Weight:       Height:                     Medications:          Physical Exam  Vitals and nursing note reviewed.   Constitutional:       General: She is not in acute distress.     Appearance: She is ill-appearing. She is not diaphoretic.   HENT:      Head: Normocephalic and atraumatic.      Mouth/Throat:      Pharynx: No oropharyngeal exudate.   Eyes:      General: No scleral icterus.     Conjunctiva/sclera: Conjunctivae normal.      Pupils: Pupils are equal, round, and reactive to light.   Cardiovascular:      Rate and Rhythm: Normal rate and regular rhythm.      Heart sounds: Normal heart sounds. No murmur heard.  Pulmonary:      Effort: Pulmonary effort is normal. No respiratory distress.      Breath sounds: Rales present. No wheezing.   Abdominal:      General: Bowel sounds are normal. There is no distension.      Palpations: Abdomen is soft.      Tenderness: There is no abdominal tenderness.   Musculoskeletal:         General: Normal range of motion.      Cervical back: Normal range of motion and neck supple.      Right lower leg: No edema.      Left lower leg: No edema.      Comments:    CVCRIJ    Skin:     General: Skin is warm and dry.      Findings: No erythema.   Neurological:      Mental Status: She is alert and oriented to person, place, and time.      Cranial Nerves: No cranial nerve deficit.   Psychiatric:         Mood and Affect: Affect normal.         Cognition and Memory: Memory normal.         Judgment: Judgment normal.         Laboratory:  Recent Labs   Lab 10/08/23  0039 10/08/23  0405   WBC 17.80* 13.20*   HGB 12.4 11.5*   HCT 38.5 36.4*    194   MONO 2.8*  0.5 3.9*  0.5   EOSINOPHIL 0.0 0.0       Recent Labs   Lab 10/08/23  0039 10/08/23  0405    137   K 5.9* 5.7*    105   CO2 21* 15*   BUN 67* 67*   CREATININE 4.9* 5.0*   CALCIUM 8.8 8.3*   PHOS  --  5.2*         Diagnostic Results:  X-Ray: Reviewed  US: Reviewed  Echo: Reviewed  ASSESSMENT/PLAN:     1. ESRD   - HD [facility] With Dr Aura Diggs   -- Last HD Friday with minimal pull per patient   now SOB Hyperkalemia needs HD with [facility]   Give midodrine  before HD   -- Keep MWF after   -- Daily Renal Function Panel  -- Avoid Hypotension.  -- Renally dose all meds    2. HTN (I10) -  Controlled   3. Anemia of chronic kidney disease treated with BILL     EPogen  with   HD as needed    Recent Labs   Lab 10/08/23  0039 10/08/23  0405   HGB 12.4 11.5*   HCT 38.5 36.4*    194         Iron   Lab Results   Component Value Date    IRON 13 (L) 02/12/2020    TIBC 462 (H) 02/12/2020    FERRITIN 148 07/17/2019       4. MBD (E88.9 M90.80) -  Recent Labs   Lab 10/08/23  0405   CALCIUM 8.3*   PHOS 5.2*       Recent Labs   Lab 10/08/23  0405   MG 2.3         Lab Results   Component Value Date    .0 (H) 12/28/2018    CALCIUM 8.3 (L) 10/08/2023    PHOS 5.2 (H) 10/08/2023     Lab Results   Component Value Date    [id number] 26 (L) 02/12/2020     Sevelamer   Lab Results   Component Value Date    CO2 15 (L) 10/08/2023       5. Nutrition/Hypoalbuminemia   Recent Labs   Lab 10/08/23  0039   ALBUMIN 3.7       Nepro with meals TID. Renal vitamins daily      Thank you for the consult, will follow  With any question please call [phone]  Ramo Da Silva MD    [facility]  EDGARD Bustillos MD, FACP,   NEIL Diggs MD,   MD CURTIS Benoit MD E. V. Harmon, NP    [address]  [place], [address] [phone]

## 2023-10-08 NOTE — PLAN OF CARE
"  Problem: Adult Inpatient Plan of Care  Goal: Plan of Care Review  Outcome: Ongoing, Progressing  Goal: Patient-Specific Goal (Individualized)  Outcome: Ongoing, Progressing  Goal: Absence of Hospital-Acquired Illness or Injury  Outcome: Ongoing, Progressing  Goal: Optimal Comfort and Wellbeing  Outcome: Ongoing, Progressing  Goal: Readiness for Transition of Care  Outcome: Ongoing, Progressing     Care Plan    POC reviewed with Katie Quevedo. Questions and concerns addressed. Pt weaned off cont BiPAP to 3L NC, sating 100%. No complaints of chest pain nor SOB. Pt progressing toward goals. Gave report to angel RN.     Neuro:  [place] Coma Scale  Best Eye Response: 4-->(E4) spontaneous  Best Motor Response: 6-->(M6) obeys commands  Best Verbal Response: 5-->(V5) oriented  Shant Coma Scale Score: 15  Assessment Qualifiers: patient not sedated/intubated  Pupil PERRLA: yes  24 hr Temp:  [98.1 °F (36.7 °C)-100 °F (37.8 °C)]      CV:  Rhythm: sinus tachycardia  DVT prophylaxis: VTE Required Core Measure: Pharmacological prophylaxis initiated/maintained    Resp:   3L NC  Oxygen Concentration (%): 35    GI/:  GI prophylaxis: yes  Diet/Nutrition Received: low saturated fat/low cholesterol  Last Bowel Movement: 10/08/23  Voiding Characteristics: external catheter No intake or output data in the 24 hours ending 10/08/23 0806    Labs/Accuchecks:  Recent Labs   Lab 10/08/23  0405   WBC 13.20*   RBC 3.52*   HGB 11.5*   HCT 36.4*         Recent Labs   Lab 10/08/23  0039 10/08/23  0405    137   K 5.9* 5.7*   CO2 21* 15*    105   BUN 67* 67*   CREATININE 4.9* 5.0*   ALKPHOS 52*  --    ALT 50*  --    AST 27  --    BILITOT 0.5  --     No results for input(s): "PROTIME", "INR", "APTT", "HEPANTIXA" in the last 168 hours.   Recent Labs   Lab 10/08/23  0210   TROPONINI 0.099*       Electrolytes: No replacement orders  Accuchecks: none    Gtts/LDAs:      Lines/Drains/Airways       Central Venous Catheter Line  " Duration                  Hemodialysis AV Graft Left upper arm -- days    Permacath right subclavian -- days              Peripheral Intravenous Line  Duration                  Peripheral IV - Single Lumen 10/08/23 0036 20 G Right Forearm <1 day                    Skin/Wounds     Wounds: No  Wound care consulted: No    Consults  Consults (From admission, onward)          Status Ordering Provider     Inpatient consult to [facility] (Caterina Bustillos, Paula)  Once        Provider:  (Not yet assigned)    Acknowledged ANNMARIE MEDINA

## 2023-10-08 NOTE — TELEPHONE ENCOUNTER
Pt asking if she should take Midodrine 10 mg if HR is already elevated. Reports /64,  and weakness. Pt also states that she has COPD and normally has some SOB. States that SOB has increased when sitting up. Advised to go to ED per protocol. VU. Encounter routed to provider.       Reason for Disposition   Difficulty breathing    Additional Information   Negative: Passed out (i.e., lost consciousness, collapsed and was not responding)   Negative: Shock suspected (e.g., cold/pale/clammy skin, too weak to stand, low BP, rapid pulse)   Negative: Difficult to awaken or acting confused (e.g., disoriented, slurred speech)   Negative: Visible sweat on face or sweat dripping down face   Negative: Unable to walk, or can only walk with assistance (e.g., requires support)   Negative: [1] Received SHOCK from implantable cardiac defibrillator AND [2] persisting symptoms (i.e., palpitations, lightheadedness)   Negative: [1] Dizziness, lightheadedness, or weakness AND [2] heart beating very rapidly (e.g., > 140 / minute)   Negative: [1] Dizziness, lightheadedness, or weakness AND [2] heart beating very slowly (e.g., < 50 / minute)   Negative: Sounds like a life-threatening emergency to the triager    Protocols used: Heart Rate and Heartbeat [id number]

## 2023-10-08 NOTE — ED NOTES
Physical Assessment    LOC: The patient is awake, alert and aware of environment with an appropriate affect, the patient is oriented x 3 and speaking appropriately.     Psych: Patient is calm and cooperative with good eye contact.    APPEARANCE: Patient is clean and non toxic appearing    NEUROLOGIC:  MINGO, Follows commands without difficulty. Speech is clear. No neuro deficits observed.    HEENT: Denies HEENT complaint or injury, moist mucus membranes     RESPIRATORY: Airway is open and patent, respirations are tachy with distress noted. Bilateral breath sounds are course. Home O2 at 3l - pt put on NRB per EMS    CARDIAC: Patient has a normal rate and rhythm, no peripheral edema noted, capillary refill < 2 seconds. PULSES are symmetrical in all extremities    GI/ : Soft and non tender to palpation, no distention noted.     MUSCULOSKELETAL:  Normal range of motion noted. Moves all extremities well, no c/o pain, no deformity noted.    SKIN: The skin is warm, dry and intact. Patient has normal skin turgor. No rashes or lesions. No Breakdown noted.

## 2023-10-08 NOTE — ASSESSMENT & PLAN NOTE
Patient with Hypoxic Respiratory failure which is Acute on chronic.  she is on home oxygen at Three LPM. Supplemental oxygen was provided and noted- Oxygen Concentration (%):  [35] 35    .   Signs/symptoms of respiratory failure include- tachypnea, increased work of breathing and respiratory distress. Contributing diagnoses includes - COPD Labs and images were reviewed. Patient Has recent ABG, which has been reviewed. Will treat underlying causes and adjust management of respiratory failure as follows-  BiPAP as needed, titrate down with improvement  Steroids  Orlin

## 2023-10-08 NOTE — PROGRESS NOTES
eICU Brief Admission Note       Briefly, 78-year-old female with h/o COPD on 3L oxygen at home, ESRD on HD MWF , CHF, HTN, h/o PE, Seizure, Melanoma presenting with shortness of breath, difficulty breathing for the last 2 days.  Patient was discharged 7 days ago from this facility for left lower lobe pneumonia, discharged on steroids and antibiotics.       Video assessment :  Lying comfortably in bed     Vitals reviewed   Febrile, stable vitals     LABs reviewed       Radiology reviewed         Assessment / Plan :  Acute on chronic hypoxic respiratory failure - possible COPD ; r/o other etiology    Leukocytosis -- likely due to steroids   Hyperkalemia   Mild troponin leak   /o COPD on 3L oxygen at home, ESRD on HD MWF , CHF, HTN, h/o PE, Seizure, Melanoma  - duonebs , IV steroids   - home meds as tolerated   - consult Nephrology for   - check D dimer; DVT US -- may get CTPE if needed        CXR   No significant detrimental change from prior.      DVT Px : Heparin SQ   GI Px : N/A       Patient seen over video : Yes  Chart reviewed : Yes  Spoke with RN : No

## 2023-10-08 NOTE — PLAN OF CARE
Dr Peñaloza here, assessed pt, noted the inc work of breathing at this time, lung fields unchanged, Asked about the sodium zirconium, states hold for now, will be dialyzing her

## 2023-10-08 NOTE — HPI
Patient is a 78-year-old female with history of COPD on 3 L O2 chronically at home, ESRD on HD MWF presenting to ED with shortness of breath, difficulty breathing for the last 2 days.  Patient was discharged 7 days ago from this facility for left lower lobe pneumonia, discharged on steroids and antibiotics.  Patient states that she started feeling poorly about 2 days ago.  Did not want to come to the hospital.  EMS reports patient at home lb BMI and upper 80s of SpO2 while on 3 L nasal cannula with respiratory distress.  Was given DuoNeb without improvement in ED patient was placed on by which her dramatically.  CBC with chemistry consistent with ESRD also with hyperkalemia 5.9.  Patient was given Lokelma.  Next plan HD is on Monday.  Chest x-ray benign.  BNP and troponin both negative.  Patient will be admitted to Hospital Medicine.  Patient's O2 has improve currently off BiPAP tolerating 3 L nasal cannula without shortness of breath.

## 2023-10-08 NOTE — PROGRESS NOTES
10/08/23 1600 10/08/23 1603   Vital Signs   Temp 98.1 °F (36.7 °C)  --    Pulse 86  --    /80 (!) 140/86   Pre-Hemodialysis Assessment   Treatment Status Completed  --    During Hemodialysis Assessment   Blood Flow Rate (mL/min) 400 mL/min  --    Dialysate Flow Rate (mL/min) 800 ml/min  --    Ultrafiltration Rate (mL/Hr) 50 mL/Hr  --    Arteriovenous Lines Secure Yes  --    Arterial Pressure (mmHg) -120 mmHg  --    Venous Pressure (mmHg) 186  --    Blood Volume Processed (Liters) 0 L  --    UF Removed (mL) 711 mL  --    TMP 3  --    Venous Line in Air Detector Yes  --    Intake (mL) 0 mL  --    Transducer Dry Yes  --    Access Visible Yes  --     notified of access issue? N/A  --    Heparin given? N/A  --    Intra-Hemodialysis Comments tx complete  --    Post-Hemodialysis Assessment   Rinseback Volume (mL) 250 mL  --    Blood Volume Processed (Liters) 82.3 L  --    Dialyzer Clearance Lightly streaked  --    Duration of Treatment 210 minutes  --    Additional Fluid Intake (mL) 500 mL  --    Total UF (mL) 711 mL  --    Net Fluid Removal 211  --    Patient Response to Treatment Pt tolerated tx well, was asymptomatic  --    Post-Hemodialysis Comments Dressing changed to CVC, Pt flushed and Heparin placed. report to RN  --

## 2023-10-08 NOTE — ED PROVIDER NOTES
"Emergency Department Encounter  Provider Note  Encounter Date: 10/8/2023    Patient Name: Katie Quevedo  MRN: [id number]    History of Present Illness   HPI  History of Present Illness:    Chief Complaint:   Chief Complaint   Patient presents with    Shortness of Breath     Per EMS family reports SOB x 2 days, but "sick" for one week.  Patient with low oxygen saturations on scene.  Given duo neb prior to arrival with little effect.  Patient presents using accessory muscles, mouth breathing, and speaking one word sentences.       78-year-old female presenting with shortness of breath, difficulty breathing for the last 2 days.  Patient was discharged 7 days ago from this facility for left lower lobe pneumonia, discharged on steroids and antibiotics.  Patient states that she started feeling poorly about 2 days ago.  Did not want to come to the hospital.  She is on 3 L of oxygen at home chronically.  When EMS got there, they noted that she was satting in the high 80s and having difficulty breathing.  They gave her a DuoNeb, which she states did not help.  No fevers or chills.  Denies chest pain.  Last dialysis yesterday, she receives dialysis Monday Wednesday Friday.    The following PMH/PSH/SocHx/FamHx has been reviewed by myself:    Past Medical History:   Diagnosis Date    Acute congestive heart failure 02/10/2020    Anemia     Bilateral renal cysts     Cataract     Chronic LBP 7/26/2012    Chronic pain     CKD (chronic kidney disease), stage IV     Colon polyp 2013    COPD (chronic obstructive pulmonary disease)     Dehydration     Encounter for blood transfusion     HTN (hypertension)     Lumbar spondylosis     Melanoma     of the lip    Metabolic bone disease     Migraines, neuralgic     Osteoporosis     Primary osteoarthritis of both knees     s/p Rt TKA    Pulmonary embolism with infarction     Seizures 1972    x1 only    Subdeltoid bursitis, L>R. 9/27/2012    Ulcer     Vitamin D deficiency disease      Past Surgical " History:   Procedure Laterality Date    BLADDER SUSPENSION      CATARACT EXTRACTION  11/18/13    left eye    CERVICAL LAMINECTOMY      x3, fusion x1    COLONOSCOPY  2009    DECLOTTING OF ARTERIOVENOUS GRAFT Left 1/3/2022    Procedure: [id number];  Surgeon: Lindsey Louie MD;  Location: [facility] OR;  Service: Vascular;  Laterality: Left;    DECLOTTING OF ARTERIOVENOUS GRAFT Left 2/8/2022    Procedure: [id number];  Surgeon: Lindsey Louie MD;  Location: [facility] OR;  Service: Vascular;  Laterality: Left;    DECLOTTING OF ARTERIOVENOUS GRAFT Left 4/8/2022    Procedure: [id number];  Surgeon: Lindsey Louie MD;  Location: [facility] OR;  Service: Vascular;  Laterality: Left;    FISTULOGRAM N/A 2/27/2020    Procedure: Fistulogram;  Surgeon: Noe Benitez MD;  Location: [facility] CATH LAB/EP;  Service: Cardiology;  Laterality: N/A;    HYSTERECTOMY      JOINT REPLACEMENT  2001    total right knee     LUMBAR LAMINECTOMY      x 3, fusion x1    OOPHORECTOMY      PERIPHERAL ANGIOGRAPHY N/A 3/9/2020    Procedure: Peripheral angiography;  Surgeon: Jacinto Henriquez MD;  Location: [facility] CATH LAB/EP;  Service: Cardiology;  Laterality: N/A;    PLACEMENT OF ARTERIOVENOUS GRAFT Left 1/21/2020    Procedure: INSERTION, GRAFT, ARTERIOVENOUS;  Surgeon: Lindsey Louie MD;  Location: [facility] OR;  Service: General;  Laterality: Left;    PLACEMENT OF ARTERIOVENOUS GRAFT Right 7/22/2022    Procedure: INSERTION, GRAFT, ARTERIOVENOUS;  Surgeon: Lindsey Louie MD;  Location: [facility] OR;  Service: General;  Laterality: Right;    PLACEMENT OF DUAL-LUMEN VASCULAR CATHETER Right 4/16/2022    Procedure: INSERTION-CATHETER-RICKI;  Surgeon: Lindsey Louie MD;  Location: [facility] OR;  Service: General;  Laterality: Right;    THROMBECTOMY Left 2/3/2020    Procedure: THROMBECTOMY;  Surgeon: Lindsey Louie MD;  Location: [facility] OR;  Service: General;  Laterality: Left;    THROMBECTOMY  3/9/2020    Procedure:  Thrombectomy;  Surgeon: Jacinto Henriquez MD;  Location: [facility] CATH LAB/EP;  Service: Cardiology;;    THROMBECTOMY Left 4/7/2022    Procedure: THROMBECTOMY;  Surgeon: Lindsey Louie MD;  Location: [facility] OR;  Service: General;  Laterality: Left;     Social History     Tobacco Use    Smoking status: Former     Types: Cigarettes    Smokeless tobacco: Former     Quit date: 2/3/2015   Substance Use Topics    Alcohol use: Not Currently     Comment: Rare    Drug use: No     Family History   Problem Relation Age of Onset    Arthritis Mother     Stroke Mother     Hypertension Father     Cancer Father     Cataracts Father     Diabetes Maternal Aunt     Hypertension Maternal Grandfather     Heart disease Maternal Grandfather     Heart attack Maternal Grandfather     Cataracts Sister     Glaucoma Cousin        Allergies reviewed:  Review of patient's allergies indicates:   Allergen Reactions    Aspirin      Other reaction(s): hx of ulcers    Tetracycline Swelling     Other reaction(s): Swelling    Penicillins Rash     Other reaction(s): Hives  Other reaction(s): Rash  Other reaction(s): Rash  Other reaction(s): Hives       Medications reviewed:  Medication List with Changes/Refills   Current Medications    ALBUTEROL (PROVENTIL/VENTOLIN HFA) 90 MCG/ACTUATION INHALER    Inhale 2 puffs into the lungs every 6 (six) hours as needed for Wheezing. Rescue    ALBUTEROL-IPRATROPIUM (DUO-NEB) 2.5 MG-0.5 MG/3 ML NEBULIZER SOLUTION    Take 3 mLs by nebulization every 6 (six) hours as needed for Shortness of Breath. Rescue    AZELASTINE (ASTELIN) 137 MCG (0.1 %) NASAL SPRAY    1 spray (137 mcg total) by Nasal route 2 (two) times daily as needed for Rhinitis.    B COMPLEX-VITAMIN C-FOLIC ACID (RENAL-RADHA) 0.8 MG TAB    Take 0.8 mg by mouth every Mon, Wed, Fri.    BENZONATATE (TESSALON) 100 MG CAPSULE    Take 1 capsule (100 mg total) by mouth 2 (two) times daily as needed for Cough.    BUSPIRONE (BUSPAR) 10 MG TABLET    Take 10 mg by mouth  2 (two) times daily. On Mondays, Wednesdays, and Fridays    CYPROHEPTADINE (PERIACTIN) 4 MG TABLET    Take 1 tablet by mouth 3 (three) times daily as needed.    DICLOFENAC SODIUM (VOLTAREN) 1 % GEL    Apply 2 g topically 3 (three) times daily as needed.    DOXERCALCIFEROL (HECTOROL IV)    Inject 2 mcg into the vein every Mon, Wed, Fri.    FLUTICASONE-UMECLIDIN-VILANTER (TRELEGY ELLIPTA) 200-62.5-25 MCG INHALER    Inhale 1 puff into the lungs once daily.    FUROSEMIDE (LASIX) 80 MG TABLET    Take 1 tablet (80 mg total) by mouth 2 (two) times daily On non dialysis days Tuesday-Thursday- Saturday -Sunday    HYDROCODONE-ACETAMINOPHEN (NORCO)  MG PER TABLET    Take 1 tablet by mouth 3 (three) times daily as needed for Pain.    LORAZEPAM (ATIVAN) 0.5 MG TABLET    Take 1 tablet (0.5 mg total) by mouth every 8 (eight) hours as needed for Anxiety (use 1-2 before dialysis for anxiety, prn bedtime).    MEGESTROL (MEGACE) 20 MG TAB    Take 1 tablet (20 mg total) by mouth 2 (two) times daily.    MIDODRINE (PROAMATINE) 10 MG TABLET    Take 1 tablet (10 mg total) by mouth in the morning and 1 tablet (10 mg total) in the evening and 1 tablet (10 mg total) before bedtime.    MIRTAZAPINE (REMERON) 7.5 MG TAB    Take 1 tablet (7.5 mg total) by mouth every evening.    ONDANSETRON 4 MG/2 ML SOLN        SEVELAMER CARBONATE (RENVELA) 800 MG TAB    Take 1 tablet by mouth 3 times a day    SODIUM CHLORIDE 7% 7 % NEBULIZER SOLUTION    Take 4 mLs by nebulization 2 (two) times a day.       ROS  Review of Systems:  History limited by patient in extremis    Physical Exam   Physical Exam    Initial Vitals [10/08/23 0022]   BP Pulse Resp Temp SpO2   (!) 157/84 (!) 125 (!) 25 100 °F (37.8 °C) 100 %      MAP       --           Triage vital signs reviewed.    Constitutional:  Cachectic, appears stated age.  Using accessory muscles to breathe.   HENT: Normocephalic, atraumatic. Moist mucous membranes.  Eyes: No conjunctival injection.  Resp:   Tachypneic, in respiratory distress, moving air  Cardio:  Tachycardic rate and rhythm.  GI: Abdomen non-distended.   MSK: Extremities without any deformities noted. No lower extremity edema.  Skin: Warm and dry. No rashes or lesions noted.  Neuro: Awake and alert. Moves all extremities.    ED Course   Procedures    Medical Decision Making    History Acquisition     The history is provided by the patient.   Assessment requiring an independent historian and why historian was needed:  EMS giving history, patient is in extremis    Review of prior external/non ED notes:  Discharge summary 10/1, patient treated for LLL pneumonia with steroids and antibiotics    Differential Diagnoses   Based on available information and initial assessment, the working Differential Diagnosis includes, but is not limited to:  PE, MI/ACS, pneumothorax, pericardial effusion/tamonade, pneumonia, lung abscess, pericarditis/myocarditis, pleural effusion, lung mass, CHF exacerbation, asthma exacerbation, COPD exacerbation, aspirated/ingested foreign body, airway obstruction, CO poisoning, anemia, metabolic derangement, allergy/atopy, influenza, viral URI, viral syndrome.  .    EKG   EKG ordered and independently reviewed by me:   Poor quality, sinus tachycardic get, no obvious STEMI    Labs   Lab tests ordered and independently reviewed by me:    Labs Reviewed   CBC W/ AUTO DIFFERENTIAL - Abnormal; Notable for the following components:       Result Value    WBC 17.80 (*)     RBC 3.78 (*)      (*)     MCH 32.8 (*)     Immature Granulocytes 1.0 (*)     Gran # (ANC) 16.3 (*)     Immature Grans (Abs) 0.17 (*)     Lymph # 0.9 (*)     Gran % 91.2 (*)     Lymph % 4.8 (*)     Mono % 2.8 (*)     All other components within normal limits   COMPREHENSIVE METABOLIC PANEL - Abnormal; Notable for the following components:    Potassium 5.9 (*)     CO2 21 (*)     BUN 67 (*)     Creatinine 4.9 (*)     Alkaline Phosphatase 52 (*)     ALT 50 (*)     eGFR 8  (*)     Anion Gap 17 (*)     All other components within normal limits   TROPONIN I - Abnormal; Notable for the following components:    Troponin I 0.098 (*)     All other components within normal limits   B-TYPE NATRIURETIC PEPTIDE - Abnormal; Notable for the following components:     (*)     All other components within normal limits   TROPONIN I   SARS-COV-2 RDRP GENE       Imaging   Imaging ordered and independently reviewed by me:   Imaging Results              X-Ray Chest AP Portable (Final result)  Result time 10/08/23 01:03:14      Final result by Mike Sheth DO (10/08/23 01:03:14)                   Impression:      No significant detrimental change from prior.      Electronically signed by: Mike Sheth  Date:    10/08/2023  Time:    01:03               Narrative:    EXAMINATION:  XR CHEST AP PORTABLE    CLINICAL HISTORY:  Chest Pain;    TECHNIQUE:  Single frontal view of the chest was performed.    COMPARISON:  09/29/2023.    FINDINGS:  There is a dual lumen right internal jugular central venous catheter terminating the mid SVC.  The lungs are hyperexpanded.  There are small left greater than right bilateral pleural effusions.  There is chronic coarse interstitial prominence with emphysematous changes, stable.  There is no new focal consolidation.  There is bibasilar atelectasis.  There is no pneumothorax.  The cardiac silhouette is unremarkable.  Osseous structures demonstrate osteopenia and degenerative changes.  Cervical spine hardware is noted.  Left upper extremity vascular stent noted.                                         Additional Consideration   Katie Quevedo  presents to the Emergency Department today with shortness of breath.  Patient is on 3 L of oxygen chronically.  She was switched to BiPAP when she arrived to the emergency department and is much more comfortable.  She is actually moving air.  Already on steroids, will not give another dose.  She states that DuoNeb did not  help her, patient is not wheezing.  Plan for labs, EKG is of poor quality, but no obvious STEMI.  Will need to admit to the ICU on BiPAP.    Additional testing considered but not indicated during the course of this workup: further imaging and labwork, not indicated  Co-morbidities/chronic illness/exacerbation of chronic illness considered which impacted clinical decision making:  ESRD, CHF, COPD  Procedures done in the ED or plan for the OR: No  Social determinants of care considered during development of treatment plan include: Decreased medical literacy  Discussion of management or test interpretation with external provider: Yes, describe:  Admitting hospitalist, treating patient for COPD exacerbation  DNR discussion: No    The patient's list of active medications and allergies as documented per RN staff has been reviewed.  Medications given in the ED and/or prescribed: Medications - No data to display          ED Course as of 10/08/23 0208   Sun Oct 08, 2023   0207 CBC auto differential(!)  Leukocytosis, in line with previous values, likely from steroid use [CS]   0207 Comprehensive metabolic panel(!)  Elevated potassium, no EKG changes. [CS]   0208 BNP(!): 482  Slightly elevated, no signs of fluid overload clinically [CS]   0208 Troponin I(!): 0.098  Elevated, patient has CKD, denies chest pain [CS]   0208 POCT ARTERIAL BLOOD GAS(!)  Independently interpreted by me, unremarkable    [CS]   0208 X-Ray Chest AP Portable  Independently interpreted by me, unremarkable    [CS]   0208 Patient admitted to the hospitalist service. [CS]      ED Course User Index  [CS] Maritza Salinas MD       Explanation of disposition:     Critical Care:    I have personally provided 35 minutes of critical care time exclusive of time spent on separately billable procedures. Time includes review of laboratory data, radiology results, discussion with consultants, and monitoring for potential decompensation. Interventions were performed as  documented above.      Clinical Impression:     1. COPD exacerbation    2. Chest pain           ED Disposition Condition    Admit Stable               Maritza Salinas MD  10/08/23 0202

## 2023-10-09 VITALS
BODY MASS INDEX: 18.06 KG/M2 | WEIGHT: 98.13 LBS | HEART RATE: 101 BPM | TEMPERATURE: 98 F | HEIGHT: 62 IN | RESPIRATION RATE: 18 BRPM | DIASTOLIC BLOOD PRESSURE: 55 MMHG | OXYGEN SATURATION: 98 % | SYSTOLIC BLOOD PRESSURE: 114 MMHG

## 2023-10-09 PROCEDURE — 94761 N-INVAS EAR/PLS OXIMETRY MLT: CPT | Mod: HCNC

## 2023-10-09 PROCEDURE — 99900035 HC TECH TIME PER 15 MIN (STAT): Mod: HCNC

## 2023-10-09 PROCEDURE — 97530 THERAPEUTIC ACTIVITIES: CPT | Mod: HCNC

## 2023-10-09 PROCEDURE — 63600175 PHARM REV CODE 636 W HCPCS: Mod: HCNC | Performed by: HOSPITALIST

## 2023-10-09 PROCEDURE — 63600175 PHARM REV CODE 636 W HCPCS: Mod: HCNC | Performed by: STUDENT IN AN ORGANIZED HEALTH CARE EDUCATION/TRAINING PROGRAM

## 2023-10-09 PROCEDURE — 80100016 HC MAINTENANCE HEMODIALYSIS: Mod: HCNC

## 2023-10-09 PROCEDURE — 97165 OT EVAL LOW COMPLEX 30 MIN: CPT | Mod: HCNC

## 2023-10-09 PROCEDURE — 27000221 HC OXYGEN, UP TO 24 HOURS: Mod: HCNC

## 2023-10-09 PROCEDURE — 97161 PT EVAL LOW COMPLEX 20 MIN: CPT | Mod: HCNC

## 2023-10-09 PROCEDURE — 25000003 PHARM REV CODE 250: Mod: HCNC | Performed by: REGISTERED NURSE

## 2023-10-09 PROCEDURE — 63600175 PHARM REV CODE 636 W HCPCS: Mod: HCNC | Performed by: REGISTERED NURSE

## 2023-10-09 RX ORDER — ONDANSETRON HYDROCHLORIDE 4 MG/5ML
4 SOLUTION ORAL 2 TIMES DAILY PRN
Qty: 50 ML | Refills: 0 | Status: SHIPPED | OUTPATIENT
Start: 2023-10-09

## 2023-10-09 RX ORDER — PREDNISONE 20 MG/1
40 TABLET ORAL DAILY
Qty: 4 TABLET | Refills: 0 | Status: ON HOLD | OUTPATIENT
Start: 2023-10-10 | End: 2023-10-27 | Stop reason: HOSPADM

## 2023-10-09 RX ORDER — HEPARIN SODIUM 1000 [USP'U]/ML
4100 INJECTION, SOLUTION INTRAVENOUS; SUBCUTANEOUS
Status: DISCONTINUED | OUTPATIENT
Start: 2023-10-09 | End: 2023-10-09 | Stop reason: HOSPADM

## 2023-10-09 RX ADMIN — HEPARIN SODIUM 4100 UNITS: 1000 INJECTION INTRAVENOUS; SUBCUTANEOUS at 05:10

## 2023-10-09 RX ADMIN — SEVELAMER CARBONATE 800 MG: 800 TABLET, FILM COATED ORAL at 04:10

## 2023-10-09 RX ADMIN — MEGESTROL ACETATE 20 MG: 20 TABLET ORAL at 08:10

## 2023-10-09 RX ADMIN — SEVELAMER CARBONATE 800 MG: 800 TABLET, FILM COATED ORAL at 11:10

## 2023-10-09 RX ADMIN — LORAZEPAM 0.5 MG: 0.5 TABLET ORAL at 12:10

## 2023-10-09 RX ADMIN — MIDODRINE HYDROCHLORIDE 10 MG: 5 TABLET ORAL at 11:10

## 2023-10-09 RX ADMIN — HEPARIN SODIUM 5000 UNITS: 5000 INJECTION INTRAVENOUS; SUBCUTANEOUS at 06:10

## 2023-10-09 RX ADMIN — MIDODRINE HYDROCHLORIDE 10 MG: 5 TABLET ORAL at 04:10

## 2023-10-09 RX ADMIN — MIDODRINE HYDROCHLORIDE 10 MG: 5 TABLET ORAL at 08:10

## 2023-10-09 RX ADMIN — HYDROCODONE BITARTRATE AND ACETAMINOPHEN 1 TABLET: 10; 325 TABLET ORAL at 04:10

## 2023-10-09 RX ADMIN — MUPIROCIN: 20 OINTMENT TOPICAL at 08:10

## 2023-10-09 RX ADMIN — FUROSEMIDE 80 MG: 40 TABLET ORAL at 08:10

## 2023-10-09 RX ADMIN — PREDNISONE 40 MG: 20 TABLET ORAL at 08:10

## 2023-10-09 RX ADMIN — SEVELAMER CARBONATE 800 MG: 800 TABLET, FILM COATED ORAL at 08:10

## 2023-10-09 NOTE — PROGRESS NOTES
[facility] Medicine  Progress Note    Patient Name: Katie Quevedo  MRN: [id number]  Patient Class: IP- Inpatient   Admission Date: 10/8/2023  Length of Stay: 1 days  Attending Physician: Jennifer Bowles*  Primary Care Provider: Kingston Verduzco MD        Subjective:     Principal Problem:Acute on chronic respiratory failure with hypoxia        HPI:  Patient is a 78-year-old female with history of COPD on 3 L O2 chronically at home, ESRD on HD MWF presenting to ED with shortness of breath, difficulty breathing for the last 2 days.  Patient was discharged 7 days ago from this facility for left lower lobe pneumonia, discharged on steroids and antibiotics.  Patient states that she started feeling poorly about 2 days ago.  Did not want to come to the hospital.  EMS reports patient at home lb BMI and upper 80s of SpO2 while on 3 L nasal cannula with respiratory distress.  Was given DuoNeb without improvement in ED patient was placed on by which her dramatically.  CBC with chemistry consistent with ESRD also with hyperkalemia 5.9.  Patient was given Lokelma.  Next plan HD is on Monday.  Chest x-ray benign.  BNP and troponin both negative.  Patient will be admitted to Hospital Medicine.  Patient's O2 has improve currently off BiPAP tolerating 3 L nasal cannula without shortness of breath.      Overview/Hospital Course:  No notes on file    Interval History:  Awake and alert, participating with therapy  Appreciate nephrology recs-HD on 10/8 and resume MWF thereafter.  Pending V/Q scan this morning  Possible discharge after HD    Review of Systems   Constitutional:  Positive for activity change. Negative for appetite change.   Respiratory:  Negative for shortness of breath.    Cardiovascular:  Negative for palpitations and leg swelling.   Gastrointestinal:  Negative for abdominal pain, nausea and vomiting.   Psychiatric/Behavioral:  Negative for agitation.    All other systems reviewed and are  "negative.    Objective:     Vital Signs (Most Recent):  Temp: 97.7 °F (36.5 °C) (10/09/23 0414)  Pulse: 85 (10/09/23 0414)  Resp: 18 (10/09/23 0429)  BP: (!) 108/57 (10/09/23 0414)  SpO2: 99 % (10/09/23 0414) Vital Signs (24h Range):  Temp:  [97.5 °F (36.4 °C)-98.8 °F (37.1 °C)] 97.7 °F (36.5 °C)  Pulse:  [] 85  Resp:  [16-56] 18  SpO2:  [97 %-100 %] 99 %  BP: (100-176)/() 108/57     Weight: 44.5 kg (98 lb 1.7 oz)  Body mass index is 17.94 kg/m².    Intake/Output Summary (Last 24 hours) at 10/9/2023 0711  Last data filed at 10/8/2023 1600  Gross per 24 hour   Intake 900 ml   Output 1011 ml   Net -111 ml         Physical Exam  Vitals reviewed.   Constitutional:       Appearance: Normal appearance.   HENT:      Head: Normocephalic.   Cardiovascular:      Rate and Rhythm: Normal rate and regular rhythm.      Pulses: Normal pulses.      Heart sounds: Normal heart sounds.   Pulmonary:      Effort: Pulmonary effort is normal.      Breath sounds: Normal breath sounds.   Abdominal:      General: Bowel sounds are normal.      Palpations: Abdomen is soft.   Musculoskeletal:         General: Normal range of motion.      Cervical back: Normal range of motion.   Skin:     General: Skin is warm and dry.      Capillary Refill: Capillary refill takes less than 2 seconds.   Neurological:      General: No focal deficit present.      Mental Status: She is alert and oriented to person, place, and time. Mental status is at baseline.   Psychiatric:         Mood and Affect: Mood normal.         Behavior: Behavior normal.             Significant Labs: A1C: No results for input(s): "HGBA1C" in the last 4320 hours.  ABGs:   Recent Labs   Lab 10/08/23  0051   PH 7.356   PCO2 41.0   HCO3 22.9*   POCSATURATED 99.6   PO2 186*     Blood Culture: No results for input(s): "LABBLOO" in the last 48 hours.  CBC:   Recent Labs   Lab 10/08/23  0039 10/08/23  0405   WBC 17.80* 13.20*   HGB 12.4 11.5*   HCT 38.5 36.4*    194     CMP: " "  Recent Labs   Lab 10/08/23  0039 10/08/23  0405    137   K 5.9* 5.7*    105   CO2 21* 15*   GLU 94 111*   BUN 67* 67*   CREATININE 4.9* 5.0*   CALCIUM 8.8 8.3*   PROT 7.3  --    ALBUMIN 3.7  --    BILITOT 0.5  --    ALKPHOS 52*  --    AST 27  --    ALT 50*  --    ANIONGAP 17* 17*     Lactic Acid: No results for input(s): "LACTATE" in the last 48 hours.  Lipase: No results for input(s): "LIPASE" in the last 48 hours.  Lipid Panel: No results for input(s): "CHOL", "HDL", "LDLCALC", "TRIG", "CHOLHDL" in the last 48 hours.  Magnesium:   Recent Labs   Lab 10/08/23  0405   MG 2.3     Troponin:   Recent Labs   Lab 10/08/23  0039 10/08/23  0210   TROPONINI 0.098* 0.099*     TSH: No results for input(s): "TSH" in the last 4320 hours.  Urine Culture: No results for input(s): "LABURIN" in the last 48 hours.  Urine Studies: No results for input(s): "COLORU", "APPEARANCEUA", "PHUR", "SPECGRAV", "PROTEINUA", "GLUCUA", "KETONESU", "BILIRUBINUA", "OCCULTUA", "NITRITE", "UROBILINOGEN", "LEUKOCYTESUR", "RBCUA", "WBCUA", "BACTERIA", "SQUAMEPITHEL", "HYALINECASTS" in the last 48 hours.    Invalid input(s): "WRIGHTSUR"    Significant Imaging: I have reviewed all pertinent imaging results/findings within the past 24 hours.      Assessment/Plan:      * Acute on chronic respiratory failure with hypoxia  Patient with Hypoxic Respiratory failure which is Acute on chronic.  she is on home oxygen at Three LPM. Supplemental oxygen was provided and noted- Oxygen Concentration (%):  [35] 35    .   Signs/symptoms of respiratory failure include- tachypnea, increased work of breathing and respiratory distress. Contributing diagnoses includes - COPD Labs and images were reviewed. Patient Has recent ABG, which has been reviewed. Will treat underlying causes and adjust management of respiratory failure as follows-  BiPAP as needed, titrate down with improvement  Steroids  DuoNerobbie    Hyperkalemia  Potassium 5.9 on admit  Given Lokelma, " repeat next day. may need additional dosing  Next HD is scheduled next for Monday      Adjustment reaction with anxiety and depression  Continue BuSpar      ESRD (end stage renal disease) on dialysis  MWF-last HD on Friday full dialysis completed  Consult nephrology for HD management-appreciate recs HD on 10/09 and resume back to normal HD on MWF      Pulmonary cachexia due to COPD  PT/OT        VTE Risk Mitigation (From admission, onward)         Ordered     heparin (porcine) injection 5,000 Units  Every 8 hours         10/08/23 0349     IP VTE HIGH RISK PATIENT  Once         10/08/23 0219     Place sequential compression device  Until discontinued         10/08/23 0219                Discharge Planning   LAITH: 10/9/2023     Code Status: Full Code   Is the patient medically ready for discharge?:     Reason for patient still in hospital (select all that apply): Pending disposition  Discharge Plan A: Home with family                  Jennifer Bowles MD  Department of Hospital Medicine   [facility] - Telemetry

## 2023-10-09 NOTE — PLAN OF CARE
"0810  Order for "ambulatory referral to [facility] " entered. Pt was previously followed by EFREN Urbina. Message sent informing of plan for the pt to discharge home today following HD treatment & requesting a hospfu home visit be scheduled.       [facility] - Emergency Dept  Initial Discharge Assessment       Primary Care Provider: Han Patel -    Admission Diagnosis: Shortness of breath [R06.02]    Admission Date: 10/9/2023  Expected Discharge Date:          Payor: HU[employer] MANAGED MEDICARE / Plan: HUMANA [facility] HMO PPO SPECIAL NEEDS / Product Type: Medicare Advantage /     Extended Emergency Contact Information  Primary Emergency Contact: Ryland Pathak  Address: [address]            [place] [address] [place]  Mobile Phone: [phone]  Relation: Spouse    Discharge Plan A: (P) Home with family  Discharge Plan B: (P) [facility]      [facility] - [place], [place] - [address]  [address] [address]  Phone: [phone] Fax: [phone]    [facility] [address]  [place] [address]  [address] [address]  Phone: [phone] Fax: [phone]      Initial Assessment (most recent)       Adult Discharge Assessment - 10/09/23 0935          Discharge Assessment    Assessment Type Discharge Planning Assessment (P)      Confirmed/corrected address, phone number and insurance Yes (P)      Confirmed Demographics Correct on Facesheet (P)      Source of Information patient;family (P)    spouseDre ([phone])    Communicated LAITH with patient/caregiver Date not available/Unable to determine (P)      People in Home spouse (P)    spouse, Dre Quevedo ([phone])    Do you expect to return to your current living situation? Yes (P)      Do you have help at home or someone to help you manage your care at home? Yes (P)      Prior to hospitilization cognitive status: Alert/Oriented (P)      Current cognitive status: " Alert/Oriented (P)      Walking or Climbing Stairs ambulation difficulty, requires equipment (P)      Equipment Currently Used at Home walker, rolling;power chair;rollator;nebulizer;oxygen;other (see comments) (P)    pox    Readmission within 30 days? No (P)      Patient currently being followed by outpatient case management? No (P)      Do you currently have service(s) that help you manage your care at home? No (P)      Do you take prescription medications? Yes (P)      Do you have prescription coverage? Yes (P)      Do you have any problems affording any of your prescribed medications? No (P)      Is the patient taking medications as prescribed? yes (P)      How do you get to doctors appointments? family or friend will provide (P)      Are you on dialysis? No (P)      Do you take coumadin? No (P)      DME Needed Upon Discharge  other (see comments) (P)    tbd    Discharge Plan discussed with: Patient;Spouse/sig other (P)      Name(s) and Number(s) spouse, Dre Quevedo ([phone]) (P)      Discharge Plan A Home with family (P)      Discharge Plan B Home Health (P)                       0935  Patient resting quietly in bed with spouse, Dre Quevedo ([phone]), at the bedside when CM rounded. Patient was admitted with acute on chronic respiratory failure & is being followed by neph, dty, PT/OT. Pox 99% on 3L O2 via NC this AM. Pt had a VQ scan done this AM & is scheduled to receive an HD treatment prior to discharge this afternoon.     Patient lives with her spouse, has equipment to assist with ADLs, uses 3L O2 at all times which was provided by [facility], has a portable O2 concentrator at the bedside, receives HD treatments at [facility] (MWF 1200) via right subclavian vee, has received HH services from [facility] in the past, & denied the need for assistance with transportation at time of discharge.     CM updated patient's whiteboard with CM name & contact information.        10/09/23 8904  "  Rounds   Attendance Nurse ;Provider   Discharge Plan A Home with family       1145  Patient resting quietly in bed with spouse at the bedside when CM rounded.  Patient in agreement with plan to discharge home with HH today after her HD treatment, denied the need for assistance with transportation at time of discharge, & verbalized understanding that she will be notified of a hospital follow up home visit by NP Katharine Urbina. Awaiting HH orders.     1235  Referral sent to [facility] via [facility]. Awaiting response.     1250  CM informed Lissette ([phone]) with [facility]- of referral previously sent.     1335  Pt accepted by [facility] Message sent to Dr Don informing of above & requesting HH orders.     1420  HH orders sent to [facility]- via [facility].     1425  Patient resting quietly in the HD center when CM rounded. CM informed pt that [facility]- will provide home health services following discharge. Pt verbalized understanding & agreement. Signed "Pt Choice" form placed in the pt's chart.     1510  Message sent to nurse Damon & virtual nurse Inna informing that the pt is cleared to discharge following HD session.       Will continue to follow.  "

## 2023-10-09 NOTE — PT/OT/SLP EVAL
Physical Therapy Evaluation and Treatment    Patient Name:  Katie Quevedo   MRN:  [id number]    Recommendations:     Discharge Recommendations: home health OT, home health PT (low intensity therapy)   Discharge Equipment Recommendations: bedside commode   Barriers to discharge: None    Assessment:     Katie Quevedo is a 78 y.o. female admitted with a medical diagnosis of Acute on chronic respiratory failure with hypoxia.  She presents with the following impairments/functional limitations: weakness, gait instability, impaired endurance, impaired self care skills, impaired balance, impaired functional mobility, pain, impaired cardiopulmonary response to activity, impaired joint extensibility, impaired muscle length .Pt demonstrates impaired endurance and decreased activity tolerance. SpO2 decreased to 93% on 2.5 L and HR increased to 129 bpm with 1 sit <>stand and side steps. Recommending  PT/OT to address impaired endurance, functional mobility, and activity tolerance. DME recs: [facility].    Rehab Prognosis: Good; patient would benefit from acute skilled PT services to address these deficits and reach maximum level of function.    Recent Surgery: * No surgery found *      Plan:     During this hospitalization, patient to be seen 5 x/week to address the identified rehab impairments via gait training, therapeutic activities, therapeutic exercises, neuromuscular re-education and progress toward the following goals:    Plan of Care Expires:  11/09/23    Subjective     Chief Complaint: back pain and impaired endurance  Patient/Family Comments/goals: pt is agreeable to therapy  Pain/Comfort:  Pain Rating 1: 7/10  Location - Side 1: Bilateral  Location - Orientation 1: generalized  Location 1: back  Pain Addressed 1: Reposition, Distraction, Cessation of Activity  Pain Rating Post-Intervention 1:  (not rated)    Patients cultural, spiritual, Ep[employer]opalian conflicts given the current situation: no    Living Environment:  Pt lives with  spouse in SSH, thres to enter, walk in tub with built in seat   Prior to admission, patients level of function was mod I toileting, bed mobility; caregiver 5x/wk 9A-5P who assists with bathing and dsg; mostly sedentary - will only ambulate to bathroom and back to bed 2/2 SOB; wears 3L O2 24/7.  Pt reports no falls. Pt uses rollator for mobility; caregiver drives pt to dialysis.  Equipment used at home: rollator, walker, rolling, power chair, oxygen.  Upon discharge, patient will have assistance from spouse and caregiver.    Objective:     Communicated with nsg prior to session.  Patient found HOB elevated with telemetry, PureWick, oxygen  upon PT entry to room.    General Precautions: Standard, fall  Orthopedic Precautions:N/A   Braces: N/A  Respiratory Status: Nasal cannula, flow 2.5 L/min    Exams:  Cognitive Exam:  Patient is oriented to Person, Place, Time, and Situation  Postural Exam:  Patient presented with the following abnormalities:    -       Rounded shoulders  Sensation:    -       Intact  light/touch BLE  Skin Integrity/Edema:      -       Skin integrity: Visible skin intact  -       Edema: None noted BLE  RLE ROM: WFL except lacking ~5-10 deg of terminal knee ext, hx of R TKA old scar  RLE Strength: grossly 4- to 4/5  LLE ROM: WFL  LLE Strength: grossly 4- to 4/5    Functional Mobility:  Bed Mobility:     Scooting: stand by assistance  Supine to Sit: stand by assistance  Sit to Supine: stand by assistance  Transfers:     Sit to Stand:  contact guard assistance with rolling walker  Gait: Pt took ~3-4 lateral steps L toward HOB with RW and CGA      AM-PAC 6 CLICK MOBILITY  Total Score:17       Treatment & Education:  Pt educated on role of PT/POC  Pt demonstrates decreased activity tolerance. Pt noted to have increased SOB and impaired endurance sitting EOB with HR increasing to 110s   SpO2 decreased to 93% on 2.5 L and HR increased to 129 bpm with 1 sit <>stand and side steps.  HR at rest in the 100s    Pt educated on PLB, energy conservation techniques, benefits of use of BSC, home safety, and benefits of therapy to improve endurance  Pt returned supine 2/2 unable to tolerate further activity       Patient left supine with all lines intact, call button in reach, bed alarm on, nsg notified, and spouse present.    GOALS:   Multidisciplinary Problems       Physical Therapy Goals          Problem: Physical Therapy    Goal Priority Disciplines Outcome Goal Variances Interventions   Physical Therapy Goal     PT, PT/OT Ongoing, Progressing     Description: Goals to be met by: 23     Patient will increase functional independence with mobility by performin. Supine to sit with Modified [place]  2. Sit to supine with Modified [place]  3. Sit to stand transfer with Modified [place]  4. Bed to chair transfer with Modified [place] using Rolling Walker  5. Gait  x 80 feet with Supervision using Rolling Walker while maintaining SpO2>88% and HR<120 bpm.                          History:     Past Medical History:   Diagnosis Date    Acute congestive heart failure 02/10/2020    Anemia     Bilateral renal cysts     Cataract     Chronic LBP 2012    Chronic pain     CKD (chronic kidney disease), stage IV     Colon polyp     COPD (chronic obstructive pulmonary disease)     Dehydration     Encounter for blood transfusion     HTN (hypertension)     Lumbar spondylosis     Melanoma     of the lip    Metabolic bone disease     Migraines, neuralgic     Osteoporosis     Primary osteoarthritis of both knees     s/p Rt TKA    Pulmonary embolism with infarction     Seizures 1972    x1 only    Subdeltoid bursitis, L>R. 2012    Ulcer     Vitamin D deficiency disease        Past Surgical History:   Procedure Laterality Date    BLADDER SUSPENSION      CATARACT EXTRACTION  13    left eye    CERVICAL LAMINECTOMY      x3, fusion x1    COLONOSCOPY      DECLOTTING OF ARTERIOVENOUS GRAFT Left  1/3/2022    Procedure: [id number];  Surgeon: Lindsey Louie MD;  Location: [facility] OR;  Service: Vascular;  Laterality: Left;    DECLOTTING OF ARTERIOVENOUS GRAFT Left 2/8/2022    Procedure: [id number];  Surgeon: Lindsey Louie MD;  Location: [facility] OR;  Service: Vascular;  Laterality: Left;    DECLOTTING OF ARTERIOVENOUS GRAFT Left 4/8/2022    Procedure: [id number];  Surgeon: Lindsey Louie MD;  Location: [facility] OR;  Service: Vascular;  Laterality: Left;    FISTULOGRAM N/A 2/27/2020    Procedure: Fistulogram;  Surgeon: Noe Benitez MD;  Location: [facility] CATH LAB/EP;  Service: Cardiology;  Laterality: N/A;    HYSTERECTOMY      JOINT REPLACEMENT  2001    total right knee     LUMBAR LAMINECTOMY      x 3, fusion x1    OOPHORECTOMY      PERIPHERAL ANGIOGRAPHY N/A 3/9/2020    Procedure: Peripheral angiography;  Surgeon: Jacinto Henriquez MD;  Location: [facility] CATH LAB/EP;  Service: Cardiology;  Laterality: N/A;    PLACEMENT OF ARTERIOVENOUS GRAFT Left 1/21/2020    Procedure: INSERTION, GRAFT, ARTERIOVENOUS;  Surgeon: Lindsey Louie MD;  Location: [facility] OR;  Service: General;  Laterality: Left;    PLACEMENT OF ARTERIOVENOUS GRAFT Right 7/22/2022    Procedure: INSERTION, GRAFT, ARTERIOVENOUS;  Surgeon: Lindsey Louie MD;  Location: [facility] OR;  Service: General;  Laterality: Right;    PLACEMENT OF DUAL-LUMEN VASCULAR CATHETER Right 4/16/2022    Procedure: INSERTION-CATHETER-RICKI;  Surgeon: Lindsey Louie MD;  Location: [facility] OR;  Service: General;  Laterality: Right;    THROMBECTOMY Left 2/3/2020    Procedure: THROMBECTOMY;  Surgeon: Lindsey Louie MD;  Location: [facility] OR;  Service: General;  Laterality: Left;    THROMBECTOMY  3/9/2020    Procedure: Thrombectomy;  Surgeon: Jacinto Henriquez MD;  Location: [facility] CATH LAB/EP;  Service: Cardiology;;    THROMBECTOMY Left 4/7/2022    Procedure: THROMBECTOMY;  Surgeon: Lindsey Louie MD;  Location: [facility] OR;  Service:  General;  Laterality: Left;       Time Tracking:     PT Received On: 10/09/23  PT Start Time: 1043     PT Stop Time: 1108  PT Total Time (min): 25 min     Billable Minutes: Evaluation 10 and Therapeutic Activity 15      10/09/2023

## 2023-10-09 NOTE — PLAN OF CARE
Problem: Physical Therapy  Goal: Physical Therapy Goal  Description: Goals to be met by: 23     Patient will increase functional independence with mobility by performin. Supine to sit with Modified [place]  2. Sit to supine with Modified [place]  3. Sit to stand transfer with Modified [place]  4. Bed to chair transfer with Modified [place] using Rolling Walker  5. Gait  x 80 feet with Supervision using Rolling Walker while maintaining SpO2>88% and HR<120 bpm.     Outcome: Ongoing, Progressing     PT Eval completed, note to follow. Pt demonstrates impaired endurance and decreased activity tolerance. SpO2 decreased to 93% on 2.5 L and HR increased to 129 bpm with 1 sit <>stand and side steps. Recommending HH PT/OT to address impaired endurance, functional mobility, and activity tolerance. DME recs: [facility].

## 2023-10-09 NOTE — NURSING
Patient back to unit from dialysis. VSS. Patient denies of SOB or other discomforts at this time. Tele box is on. O2 at 3L via NC. No acute distress noted at this time.

## 2023-10-09 NOTE — DISCHARGE SUMMARY
[facility] Medicine  Discharge Summary      Patient Name: Katie Quevedo  MRN: [id number]  ARIADNA: [id number]  Patient Class: IP- Inpatient  Admission Date: 10/8/2023  Hospital Length of Stay: 1 days  Discharge Date and Time: 10/9/2023  6:16 PM  Attending Physician: Jennifer Bowles*   Discharging Provider: Jennifer Bowles MD  Primary Care Provider: Kingston Verduzco MD    Primary Care Team: Networked reference to record PCT     HPI:   Patient is a 78-year-old female with history of COPD on 3 L O2 chronically at home, ESRD on HD MWF presenting to ED with shortness of breath, difficulty breathing for the last 2 days.  Patient was discharged 7 days ago from this facility for left lower lobe pneumonia, discharged on steroids and antibiotics.  Patient states that she started feeling poorly about 2 days ago.  Did not want to come to the hospital.  EMS reports patient at home lb BMI and upper 80s of SpO2 while on 3 L nasal cannula with respiratory distress.  Was given DuoNeb without improvement in ED patient was placed on by which her dramatically.  CBC with chemistry consistent with ESRD also with hyperkalemia 5.9.  Patient was given Lokelma.  Next plan HD is on Monday.  Chest x-ray benign.  BNP and troponin both negative.  Patient will be admitted to Hospital Medicine.  Patient's O2 has improve currently off BiPAP tolerating 3 L nasal cannula without shortness of breath.      * No surgery found *      Hospital Course:   No notes on file     Goals of Care Treatment Preferences:  Code Status: Full Code       LaPOST: Yes  What is most important right now is to focus on spending time at home, avoiding the hospital, remaining as independent as possible, improvement in condition but with limits to invasive therapies.  Accordingly, we have decided that the best plan to meet the patient's goals includes continuing with treatment.      Consults:   Consults (From admission, onward)          Status  Ordering Provider     IP consult to case management  Once        Provider:  (Not yet assigned)    Acknowledged INNOCENT-CHINA RICHARDSON     Inpatient consult to Registered Dietitian/Nutritionist  Once        Provider:  (Not yet assigned)    Acknowledged CHINA MCWILLIAMS     Inpatient consult to [facility]ph[facility] (Caterina Bustillos Nimkevych)  Once        Provider:  (Not yet assigned)    Acknowledged ANNMARIE MEDINA            Pulmonary  Pulmonary cachexia due to COPD  PT/OT      Renal/  Hyperkalemia  Potassium 5.9 on admit  Given Lokelma, repeat next day. may need additional dosing  Next HD is scheduled next for Monday      ESRD (end stage renal disease) on dialysis  MWF-last HD on Friday full dialysis completed  Consult nephrology for HD management-appreciate recs HD on 10/09 and resume back to normal HD on MWF      Other  Adjustment reaction with anxiety and depression  Continue BuSpar        Final Active Diagnoses:    Diagnosis Date Noted POA    PRINCIPAL PROBLEM:  Acute on chronic respiratory failure with hypoxia [J96.21] 03/05/2018 Yes    Hyperkalemia [E87.5] 09/30/2023 Yes    Adjustment reaction with anxiety and depression [F43.23] 11/01/2022 Yes    Advance care planning [Z71.89] 10/18/2022 Not Applicable    ESRD (end stage renal disease) on dialysis [N18.6, Z99.2] 02/19/2020 Not Applicable     Chronic    Pulmonary cachexia due to COPD [R64, J44.9] 12/18/2019 Yes      Problems Resolved During this Admission:       Discharged Condition: stable    Disposition: [place]-Health Care [facility]    Follow Up:   Follow-up Information       Katharine Urbina NP Follow up.    Specialty: Internal Medicine  Why: Patient will be notified of a hospital follow up home visit  Contact information:  [facility]  [facility] [address]  [phone]                           Patient Instructions:      Ambulatory referral/consult to [facility]   Standing Status:  Future   Referral Priority: Routine Referral Type: Consultation   Referral Reason: Specialty Services Required   Number of Visits Requested: 1       Significant Diagnostic Studies:     Pending Diagnostic Studies:       None           Medications:  Reconciled Home Medications:      Medication List        START taking these medications      ondansetron 4 mg/5 mL solution  Commonly known as: ZOFRAN  Take 5 mLs (4 mg total) by mouth 2 (two) times daily as needed for Nausea.     predniSONE 20 MG tablet  Commonly known as: DELTASONE  Take 2 tablets (40 mg total) by mouth once daily.  Start taking on: October 10, 2023            CONTINUE taking these medications      albuterol 90 mcg/actuation inhaler  Commonly known as: PROVENTIL/VENTOLIN HFA  Inhale 2 puffs into the lungs every 6 (six) hours as needed for Wheezing. Rescue     albuterol-ipratropium 2.5 mg-0.5 mg/3 mL nebulizer solution  Commonly known as: DUO-NEB  Take 3 mLs by nebulization every 6 (six) hours as needed for Shortness of Breath. Rescue     azelastine 137 mcg (0.1 %) nasal spray  Commonly known as: ASTELIN  1 spray (137 mcg total) by Nasal route 2 (two) times daily as needed for Rhinitis.     busPIRone 10 MG tablet  Commonly known as: BUSPAR  Take 10 mg by mouth 2 (two) times daily. On Mondays, Wednesdays, and Fridays     cyproheptadine 4 mg tablet  Commonly known as: PERIACTIN  Take 1 tablet by mouth 3 (three) times daily as needed.     diclofenac sodium 1 % Gel  Commonly known as: VOLTAREN  Apply 2 g topically 3 (three) times daily as needed.     furosemide 80 MG tablet  Commonly known as: LASIX  Take 1 tablet (80 mg total) by mouth 2 (two) times daily On non dialysis days Tuesday-Thursday- Saturday -Sunday     HECTOROL IV  Inject 2 mcg into the vein every Mon, Wed, Fri.     HYDROcodone-acetaminophen  mg per tablet  Commonly known as: NORCO  Take 1 tablet by mouth 3 (three) times daily as needed for Pain.     LORazepam 0.5 MG tablet  Commonly known  as: ATIVAN  Take 1 tablet (0.5 mg total) by mouth every 8 (eight) hours as needed for Anxiety (use 1-2 before dialysis for anxiety, prn bedtime).     megestroL 20 MG Tab  Commonly known as: MEGACE  Take 1 tablet (20 mg total) by mouth 2 (two) times daily.     midodrine 10 MG tablet  Commonly known as: PROAMATINE  Take 1 tablet (10 mg total) by mouth in the morning and 1 tablet (10 mg total) in the evening and 1 tablet (10 mg total) before bedtime.     mirtazapine 7.5 MG Tab  Commonly known as: REMERON  Take 1 tablet (7.5 mg total) by mouth every evening.     PulmosaL 7 % nebulizer solution  Generic drug: sodium chloride 7%  Take 4 mLs by nebulization 2 (two) times a day.     RENAL-RADHA 0.8 mg Tab  Generic drug: B complex-vitamin C-folic acid  Take 0.8 mg by mouth every Mon, Wed, Fri.     sevelamer carbonate 800 mg Tab  Commonly known as: RENVELA  Take 1 tablet by mouth 3 times a day     TRELEGY ELLIPTA 200-62.5-25 mcg inhaler  Generic drug: fluticasone-umeclidin-vilanter  Inhale 1 puff into the lungs once daily.            STOP taking these medications      ondansetron 4 mg/2 mL Soln              Indwelling Lines/Drains at time of discharge:   Lines/Drains/Airways       Central Venous Catheter Line  Duration                  Hemodialysis AV Graft Left upper arm -- days    Permacath  right subclavian -- days    Permacath right subclavian -- days              Drain  Duration             Female External Urinary Catheter 10/08/23 0300 1 day                    Time spent on the discharge of patient: 35 minutes         Jennifer Bowles MD  Department of Hospital Medicine  [facility]

## 2023-10-09 NOTE — PLAN OF CARE
Problem: Occupational Therapy  Goal: Occupational Therapy Goal  Description: Goals to be met by: 11/9/23     Patient will increase functional independence with ADLs by performing:    Grooming while seated with Modified [place].  Toileting from toilet with Modified [place] for hygiene and clothing management.   Supine to sit with Modified [place].  Step transfer with Modified [place]  Toilet transfer to toilet with Modified [place].  Increased functional strength to WFL for self care skills and functional mobility.  Upper extremity exercise program x10 reps per handout, with independence.    Outcome: Ongoing, Progressing     Pt would benefit from cont OT services in order to maximize functional independence. Recommending HHOT/PT at d/c with [facility].

## 2023-10-09 NOTE — NURSING
AVS printed and handed to patient. Discharge instructions reviewed by HERBERT Hill RN. IV site removed and telemetry box discontinued without adverse reactions noted. Patient discharged with her belongings via wheelchair to main entrance and with her portable O2 concentrator on. No acute distress noted.

## 2023-10-09 NOTE — CONSULTS
"  [place] - Telemetry  Adult Nutrition  Consult Note    SUMMARY     Recommendations    Recommendation:  1. Add Novasource Renal 1x daily.   2. Encourage intake at meals as tolerated.   3. Monitor weight/labs.   4. RD to follow to monitor po intake    Goals:  Pt will tolerate diet with at least 50-75% intake at meals by RD follow up  Nutrition Goal Status: new  Communication of RD Recs: reviewed with RN    Assessment and Plan  Nutrition Problem  Inadequate energy intake    Related to (etiology):   dx    Signs and Symptoms (as evidenced by):   Poor appetite     Interventions:  Collaboration with other providers  Genmab beverage    Nutrition Diagnosis Status:   New      Malnutrition Assessment  Unable to assess NFPE 2/2 pt off unit to HD at visit       Reason for Assessment  Reason For Assessment: consult (poor appetite)  Diagnosis:  (resp failure)  Relevant Medical History: thrombectomy, COPD, renal cysts, HTN, CKD, seziures, CHF, lumbar laminectomy, hysterectomy  General Information Comments: Pt on Cardiac Renal diet with 25-50% intake at meals. No recent weight loss noted. Ernesto 19-skin intact. Receives HD on MWF. Noted possible d/c today. Pt was off the unit to HD at visit-unable to assess NFPE.  Nutrition Discharge Planning: pt to d/c on Cardiac Renal diet    Nutrition Risk Screen  Nutrition Risk Screen: no indicators present    Nutrition/Diet History  Food Preferences: no Hindu or cultural food prefs identified  Factors Affecting Nutritional Intake: decreased appetite    Anthropometrics  Temp: 98.4 °F (36.9 °C)  Height Method: Estimated  Height: 5' 2" (157.5 cm)  Height (inches): 62 in  Weight Method: Bed Scale  Weight: 44.5 kg (98 lb 1.7 oz)  Weight (lb): 98.11 lb  Ideal Body Weight (IBW), Female: 110 lb  % Ideal Body Weight, Female (lb): 89.19 %  BMI (Calculated): 17.9  BMI Grade: 17 - 18.4 protein-energy malnutrition grade I  Usual Body Weight (UBW), k.4 kg ()  % Usual Body Weight: 105.17  % " Weight Change From Usual Weight: 4.95 %    Lab/Procedures/Meds  Pertinent Labs Reviewed: reviewed  Pertinent Labs Comments: K 5.7H, BUN 67H, Crea 5.0H, Glu 111H, Ca 8.3L, Phos 5.2H  Pertinent Medications Reviewed: reviewed  Pertinent Medications Comments: Lasix, heparin, Megace, prednisone    Estimated/Assessed Needs  Weight Used For Calorie Calculations: 44.5 kg (98 lb 1.7 oz)  Energy Calorie Requirements (kcal): 1557 (35 kcal/kg)  Energy Need Method: Kcal/kg  Protein Requirements: 57g (1.3g/kg)  Weight Used For Protein Calculations: 44.5 kg (98 lb 1.7 oz)  Estimated Fluid Requirement Method: RDA Method  RDA Method (mL): 1557    Nutrition Prescription Ordered  Current Diet Order: Cardiac Renal    Evaluation of Received Nutrient/Fluid Intake  I/O: 900/1011  Energy Calories Required: not meeting needs  Protein Required: not meeting needs  Fluid Required: not meeting needs  Comments: LBM 10/8  % Intake of Estimated Energy Needs: 25 - 50 %  % Meal Intake: 25 - 50 %    Nutrition Risk  Level of Risk/Frequency of Follow-up:  (2xweekly)     Monitor and Evaluation  Food and Nutrient Intake: food and beverage intake  Food and Nutrient Adminstration: diet order  Physical Activity and Function: nutrition-related ADLs and IADLs  Anthropometric Measurements: weight  Biochemical Data, Medical Tests and Procedures: electrolyte and renal panel  Nutrition-Focused Physical Findings: overall appearance     Nutrition Follow-Up  RD Follow-up?: Yes

## 2023-10-09 NOTE — ASSESSMENT & PLAN NOTE
Potassium 5.9 on admit  Given Lokelma, repeat next day. may need additional dosing  Next HD is scheduled next for Monday

## 2023-10-09 NOTE — PLAN OF CARE
Problem: Adult Inpatient Plan of Care  Goal: Plan of Care Review  Outcome: Ongoing, Progressing  Goal: Patient-Specific Goal (Individualized)  Outcome: Ongoing, Progressing  Goal: Absence of Hospital-Acquired Illness or Injury  Outcome: Ongoing, Progressing  Goal: Optimal Comfort and Wellbeing  Outcome: Ongoing, Progressing  Goal: Readiness for Transition of Care  Outcome: Ongoing, Progressing     Problem: Fall Injury Risk  Goal: Absence of Fall and Fall-Related Injury  Outcome: Ongoing, Progressing     Problem: Device-Related Complication Risk (Hemodialysis)  Goal: Safe, Effective Therapy Delivery  Outcome: Ongoing, Progressing     Problem: Hemodynamic Instability (Hemodialysis)  Goal: Effective Tissue Perfusion  Outcome: Ongoing, Progressing     Problem: Infection (Hemodialysis)  Goal: Absence of Infection Signs and Symptoms  Outcome: Ongoing, Progressing     Problem: Skin Injury Risk Increased  Goal: Skin Health and Integrity  Outcome: Ongoing, Progressing     Problem: Electrolyte Imbalance (Chronic Kidney Disease)  Goal: Electrolyte Balance  Outcome: Ongoing, Progressing     Problem: Fluid Volume Excess (Chronic Kidney Disease)  Goal: Fluid Balance  Outcome: Ongoing, Progressing     Problem: Hematologic Alteration (Chronic Kidney Disease)  Goal: Absence of Anemia Signs and Symptoms  Outcome: Ongoing, Progressing     Problem: Renal Function Impairment (Chronic Kidney Disease)  Goal: Minimize Renal Failure Effects  Outcome: Ongoing, Progressing     Problem: Gas Exchange Impaired  Goal: Optimal Gas Exchange  Outcome: Ongoing, Progressing     Problem: COPD (Chronic Obstructive Pulmonary Disease) Comorbidity  Goal: Maintenance of COPD Symptom Control  Outcome: Ongoing, Progressing     Problem: Heart Failure Comorbidity  Goal: Maintenance of Heart Failure Symptom Control  Outcome: Ongoing, Progressing     Problem: Hypertension Comorbidity  Goal: Blood Pressure in Desired Range  Outcome: Ongoing, Progressing      Problem: Osteoarthritis Comorbidity  Goal: Maintenance of Osteoarthritis Symptom Control  Outcome: Ongoing, Progressing     Problem: Pain Chronic (Persistent) (Comorbidity Management)  Goal: Acceptable Pain Control and Functional Ability  Outcome: Ongoing, Progressing     Problem: Seizure Disorder Comorbidity  Goal: Maintenance of Seizure Control  Outcome: Ongoing, Progressing

## 2023-10-09 NOTE — PROCEDURES
"Patient seen and examined on HD tolerating well. No complains.   /70 (BP Location: Right arm, Patient Position: Lying)   Pulse 108   Temp 98.4 °F (36.9 °C) (Oral)   Resp 17   Ht 5' 2" (1.575 m)   Wt 44.5 kg (98 lb 1.7 oz)   LMP  (LMP Unknown)   SpO2 95%   Breastfeeding No   BMI 17.94 kg/m²     With any question please call answering service [phone]  Ramo Da Silva MD    [facility]   NEIL Diggs MD,   MD CURTIS Benoit MD E. V. Harmon, NP    [address]  [place], [address]   "

## 2023-10-09 NOTE — SUBJECTIVE & OBJECTIVE
Interval History:  Awake and alert, participating with therapy  Appreciate nephrology recs-HD on 10/8 and resume MWF thereafter.  Pending V/Q scan this morning  Possible discharge after HD    Review of Systems   Constitutional:  Positive for activity change. Negative for appetite change.   Respiratory:  Negative for shortness of breath.    Cardiovascular:  Negative for palpitations and leg swelling.   Gastrointestinal:  Negative for abdominal pain, nausea and vomiting.   Psychiatric/Behavioral:  Negative for agitation.    All other systems reviewed and are negative.    Objective:     Vital Signs (Most Recent):  Temp: 97.7 °F (36.5 °C) (10/09/23 0414)  Pulse: 85 (10/09/23 0414)  Resp: 18 (10/09/23 0429)  BP: (!) 108/57 (10/09/23 0414)  SpO2: 99 % (10/09/23 0414) Vital Signs (24h Range):  Temp:  [97.5 °F (36.4 °C)-98.8 °F (37.1 °C)] 97.7 °F (36.5 °C)  Pulse:  [] 85  Resp:  [16-56] 18  SpO2:  [97 %-100 %] 99 %  BP: (100-176)/() 108/57     Weight: 44.5 kg (98 lb 1.7 oz)  Body mass index is 17.94 kg/m².    Intake/Output Summary (Last 24 hours) at 10/9/2023 0711  Last data filed at 10/8/2023 1600  Gross per 24 hour   Intake 900 ml   Output 1011 ml   Net -111 ml         Physical Exam  Vitals reviewed.   Constitutional:       Appearance: Normal appearance.   HENT:      Head: Normocephalic.   Cardiovascular:      Rate and Rhythm: Normal rate and regular rhythm.      Pulses: Normal pulses.      Heart sounds: Normal heart sounds.   Pulmonary:      Effort: Pulmonary effort is normal.      Breath sounds: Normal breath sounds.   Abdominal:      General: Bowel sounds are normal.      Palpations: Abdomen is soft.   Musculoskeletal:         General: Normal range of motion.      Cervical back: Normal range of motion.   Skin:     General: Skin is warm and dry.      Capillary Refill: Capillary refill takes less than 2 seconds.   Neurological:      General: No focal deficit present.      Mental Status: She is alert and  "oriented to person, place, and time. Mental status is at baseline.   Psychiatric:         Mood and Affect: Mood normal.         Behavior: Behavior normal.             Significant Labs: A1C: No results for input(s): "HGBA1C" in the last 4320 hours.  ABGs:   Recent Labs   Lab 10/08/23  0051   PH 7.356   PCO2 41.0   HCO3 22.9*   POCSATURATED 99.6   PO2 186*     Blood Culture: No results for input(s): "LABBLOO" in the last 48 hours.  CBC:   Recent Labs   Lab 10/08/23  0039 10/08/23  0405   WBC 17.80* 13.20*   HGB 12.4 11.5*   HCT 38.5 36.4*    194     CMP:   Recent Labs   Lab 10/08/23  0039 10/08/23  0405    137   K 5.9* 5.7*    105   CO2 21* 15*   GLU 94 111*   BUN 67* 67*   CREATININE 4.9* 5.0*   CALCIUM 8.8 8.3*   PROT 7.3  --    ALBUMIN 3.7  --    BILITOT 0.5  --    ALKPHOS 52*  --    AST 27  --    ALT 50*  --    ANIONGAP 17* 17*     Lactic Acid: No results for input(s): "LACTATE" in the last 48 hours.  Lipase: No results for input(s): "LIPASE" in the last 48 hours.  Lipid Panel: No results for input(s): "CHOL", "HDL", "LDLCALC", "TRIG", "CHOLHDL" in the last 48 hours.  Magnesium:   Recent Labs   Lab 10/08/23  0405   MG 2.3     Troponin:   Recent Labs   Lab 10/08/23  0039 10/08/23  0210   TROPONINI 0.098* 0.099*     TSH: No results for input(s): "TSH" in the last 4320 hours.  Urine Culture: No results for input(s): "LABURIN" in the last 48 hours.  Urine Studies: No results for input(s): "COLORU", "APPEARANCEUA", "PHUR", "SPECGRAV", "PROTEINUA", "GLUCUA", "KETONESU", "BILIRUBINUA", "OCCULTUA", "NITRITE", "UROBILINOGEN", "LEUKOCYTESUR", "RBCUA", "WBCUA", "BACTERIA", "SQUAMEPITHEL", "HYALINECASTS" in the last 48 hours.    Invalid input(s): "WRIGHTSUR"    Significant Imaging: I have reviewed all pertinent imaging results/findings within the past 24 hours.  "

## 2023-10-09 NOTE — PLAN OF CARE
[facility] - Telemetry      HOME HEALTH ORDERS  FACE TO FACE ENCOUNTER    Patient Name: Katie Quevedo  YOB: 1945    PCP: Kingston Verduzco MD   PCP Address: [address] / [place] [address]  PCP Phone Number: [phone]  PCP Fax: [phone]    Encounter Date: 10/8/23    Admit to Home Health    Diagnoses:  Active Hospital Problems    Diagnosis  POA    *Acute on chronic respiratory failure with hypoxia [J96.21]  Yes    Hyperkalemia [E87.5]  Yes    Adjustment reaction with anxiety and depression [F43.23]  Yes    Advance care planning [Z71.89]  Not Applicable    ESRD (end stage renal disease) on dialysis [N18.6, Z99.2]  Not Applicable     Chronic             Pulmonary cachexia due to COPD [R64, J44.9]  Yes     Pt has MMRC 3 symptoms of dyspnea at baseline. It is not clear to me that her current symptoms are related to her COPD. Recommend continuing trelegy for now & prn nebulizer treatments.         Resolved Hospital Problems   No resolved problems to display.       Follow Up Appointments:  Future Appointments   Date Time Provider Department Center   11/2/2023 10:00 AM Pushpa Mcmahon MD [facility] PHYSMED [facility]   11/6/2023  3:30 PM Giancarlo Rust MD [facility] PULMSVC [facility]       Allergies:  Review of patient's allergies indicates:   Allergen Reactions    Aspirin      Other reaction(s): hx of ulcers    Tetracycline Swelling     Other reaction(s): Swelling    Penicillins Rash     Other reaction(s): Hives  Other reaction(s): Rash  Other reaction(s): Rash  Other reaction(s): Hives       Medications: Review discharge medications with patient and family and provide education.    Current Facility-Administered Medications   Medication Dose Route Frequency Provider Last Rate Last Admin    0.9%  NaCl infusion   Intravenous PRN Ramo Da Silva MD        acetaminophen tablet 650 mg  650 mg Oral Q4H PRN Denilson Mendoza, NP        albuterol-ipratropium 2.5 mg-0.5 mg/3 mL nebulizer  solution 3 mL  3 mL Nebulization Q4H PRN Bernabe Craig MD        busPIRone tablet 10 mg  10 mg Oral Once per day on Mon Wed Fri Denilson Mendoza NP        busPIRone tablet 10 mg  10 mg Oral Once per day on Mon Wed Fri Denilson Mendoza, NP        furosemide tablet 80 mg  80 mg Oral BID Denilson Mendoza, NP   80 mg at 10/09/23 0859    heparin (porcine) injection 5,000 Units  5,000 Units Subcutaneous Q8H Bernabe Carig MD   5,000 Units at 10/09/23 0617    HYDROcodone-acetaminophen  mg per tablet 1 tablet  1 tablet Oral TID PRN Denilson Mendoza NP   1 tablet at 10/09/23 0429    LORazepam tablet 0.5 mg  0.5 mg Oral Q8H PRN Denilson Mendoza, NP   0.5 mg at 10/09/23 1216    megestroL tablet 20 mg  20 mg Oral BID Denilson Mendoza, NP   20 mg at 10/09/23 0859    melatonin tablet 6 mg  6 mg Oral Nightly PRN Syed Valdez MD   6 mg at 10/08/23 2319    midodrine tablet 10 mg  10 mg Oral TID  Denilson Mendoza, NP   10 mg at 10/09/23 1124    mirtazapine tablet 7.5 mg  7.5 mg Oral QHS Denilson Mendoza, NP   7.5 mg at 10/08/23 2053    mupirocin 2 % ointment   Nasal BID Jennifer Bowles MD   Given at 10/09/23 0859    predniSONE tablet 40 mg  40 mg Oral Daily Denilson Mendoza, NP   40 mg at 10/09/23 0859    sevelamer carbonate tablet 800 mg  800 mg Oral TID  Denilson Mendoza, NP   800 mg at 10/09/23 1124    sodium chloride 0.9% bolus 250 mL 250 mL  250 mL Intravenous PRN Ramo Da Silva MD        sodium chloride 0.9% flush 10 mL  10 mL Intravenous PRN KellyDenilson ronquillo NP         Current Discharge Medication List        START taking these medications    Details   ondansetron (ZOFRAN) 4 mg/5 mL solution Take 5 mLs (4 mg total) by mouth 2 (two) times daily as needed for Nausea.  Qty: 50 mL, Refills: 0      predniSONE (DELTASONE) 20 MG tablet Take 2 tablets (40 mg total) by mouth once daily.  Qty: 4 tablet, Refills: 0           CONTINUE  these medications which have NOT CHANGED    Details   albuterol (PROVENTIL/VENTOLIN HFA) 90 mcg/actuation inhaler Inhale 2 puffs into the lungs every 6 (six) hours as needed for Wheezing. Rescue  Qty: 18 g, Refills: 11    Associated Diagnoses: Centrilobular emphysema      albuterol-ipratropium (DUO-NEB) 2.5 mg-0.5 mg/3 mL nebulizer solution Take 3 mLs by nebulization every 6 (six) hours as needed for Shortness of Breath. Rescue  Qty: 270 mL, Refills: 11    Associated Diagnoses: Centrilobular emphysema      azelastine (ASTELIN) 137 mcg (0.1 %) nasal spray 1 spray (137 mcg total) by Nasal route 2 (two) times daily as needed for Rhinitis.      B complex-vitamin C-folic acid (RENAL-RADHA) 0.8 mg Tab Take 0.8 mg by mouth every Mon, Wed, Fri.      busPIRone (BUSPAR) 10 MG tablet Take 10 mg by mouth 2 (two) times daily. On Mondays, Wednesdays, and Fridays      cyproheptadine (PERIACTIN) 4 mg tablet Take 1 tablet by mouth 3 (three) times daily as needed.      diclofenac sodium (VOLTAREN) 1 % Gel Apply 2 g topically 3 (three) times daily as needed.      doxercalciferol (HECTOROL IV) Inject 2 mcg into the vein every Mon, Wed, Fri.      fluticasone-umeclidin-vilanter (TRELEGY ELLIPTA) 200-62.5-25 mcg inhaler Inhale 1 puff into the lungs once daily.  Qty: 60 each, Refills: 11    Associated Diagnoses: Centrilobular emphysema      furosemide (LASIX) 80 MG tablet Take 1 tablet (80 mg total) by mouth 2 (two) times daily On non dialysis days Tuesday-Thursday- Saturday -Sunday  Qty: 60 tablet, Refills: 11    Comments: On non dialysis days Tuesday-Thursday- Saturday -Sunday      HYDROcodone-acetaminophen (NORCO)  mg per tablet Take 1 tablet by mouth 3 (three) times daily as needed for Pain.  Qty: 90 tablet, Refills: 0    Comments: Medically necessary for > 7 days due to chronic pain.      LORazepam (ATIVAN) 0.5 MG tablet Take 1 tablet (0.5 mg total) by mouth every 8 (eight) hours as needed for Anxiety (use 1-2 before dialysis for  anxiety, prn bedtime).  Qty: 90 tablet, Refills: 1    Associated Diagnoses: Adjustment reaction with anxiety and depression      megestroL (MEGACE) 20 MG Tab Take 1 tablet (20 mg total) by mouth 2 (two) times daily.  Qty: 60 tablet, Refills: 11    Associated Diagnoses: Moderate protein-calorie malnutrition      midodrine (PROAMATINE) 10 MG tablet Take 1 tablet (10 mg total) by mouth in the morning and 1 tablet (10 mg total) in the evening and 1 tablet (10 mg total) before bedtime.  Qty: 90 tablet, Refills: 3      mirtazapine (REMERON) 7.5 MG Tab Take 1 tablet (7.5 mg total) by mouth every evening.  Qty: 30 tablet, Refills: 11      sevelamer carbonate (RENVELA) 800 mg Tab Take 1 tablet by mouth 3 times a day  Qty: 90 tablet, Refills: 12      sodium chloride 7% 7 % nebulizer solution Take 4 mLs by nebulization 2 (two) times a day.  Qty: 240 mL, Refills: 2    Associated Diagnoses: Centrilobular emphysema; Bronchiectasis without complication           STOP taking these medications       ondansetron 4 mg/2 mL Soln Comments:   Reason for Stopping:                 I have seen and examined this patient within the last 30 days. My clinical findings that support the need for the home health skilled services and home bound status are the following:no   Weakness/numbness causing balance and gait disturbance due to Heart Failure, COPD Exacerbation, and Weakness/Debility making it taxing to leave home.     Diet:   cardiac diet        Referrals/ Consults  Physical Therapy to evaluate and treat. Evaluate for home safety and equipment needs; Establish/upgrade home exercise program. Perform / instruct on therapeutic exercises, gait training, transfer training, and Range of Motion.  Occupational Therapy to evaluate and treat. Evaluate home environment for safety and equipment needs. Perform/Instruct on transfers, ADL training, ROM, and therapeutic exercises.    Activities:   activity as tolerated    Nursing:   Agency to admit patient  within 24 hours of hospital discharge unless specified on physician order or at patient request    SN to complete comprehensive assessment including routine vital signs. Instruct on disease process and s/s of complications to report to MD. Review/verify medication list sent home with the patient at time of discharge  and instruct patient/caregiver as needed. Frequency may be adjusted depending on start of care date.     Skilled nurse to perform up to 3 visits PRN for symptoms related to diagnosis    Notify MD if SBP > 160 or < 90; DBP > 90 or < 50; HR > 120 or < 50; Temp > 101; O2 < 88%; Other:       Ok to schedule additional visits based on staff availability and patient request on consecutive days within the home health episode.    When multiple disciplines ordered:    Start of Care occurs on Sunday - Wednesday schedule remaining discipline evaluations as ordered on separate consecutive days following the start of care.    Thursday SOC -schedule subsequent evaluations Friday and Monday the following week.     Friday - Saturday SOC - schedule subsequent discipline evaluations on consecutive days starting Monday of the following week.    For all post-discharge communication and subsequent orders please contact patient's primary care physician. If unable to reach primary care physician or do not receive response within 30 minutes, please contact  for clinical staff order clarification    Miscellaneous   Home Oxygen:  No change    Home Health Aide:  Nursing Three times weekly, Physical Therapy Three times weekly, and Occupational Therapy Three times weekly        I certify that this patient is confined to her home and needs intermittent skilled nursing care, physical therapy, and occupational therapy.

## 2023-10-09 NOTE — PROGRESS NOTES
[facility]                    Pharmacy       Discharge Medication Education    Patient ACCEPTED medication education. Pharmacy has provided education on the name, indication, and possible side effects of the medication(s) prescribed, using teach-back method.     The following medications have also been discussed, during this admission.        Medication List        START taking these medications      predniSONE 20 MG tablet  Commonly known as: DELTASONE  Take 2 tablets (40 mg total) by mouth once daily.  Start taking on: October 10, 2023            CONTINUE taking these medications      albuterol 90 mcg/actuation inhaler  Commonly known as: PROVENTIL/VENTOLIN HFA  Inhale 2 puffs into the lungs every 6 (six) hours as needed for Wheezing. Rescue     albuterol-ipratropium 2.5 mg-0.5 mg/3 mL nebulizer solution  Commonly known as: DUO-NEB  Take 3 mLs by nebulization every 6 (six) hours as needed for Shortness of Breath. Rescue     azelastine 137 mcg (0.1 %) nasal spray  Commonly known as: ASTELIN  1 spray (137 mcg total) by Nasal route 2 (two) times daily as needed for Rhinitis.     busPIRone 10 MG tablet  Commonly known as: BUSPAR     cyproheptadine 4 mg tablet  Commonly known as: PERIACTIN     diclofenac sodium 1 % Gel  Commonly known as: VOLTAREN     furosemide 80 MG tablet  Commonly known as: LASIX  Take 1 tablet (80 mg total) by mouth 2 (two) times daily On non dialysis days Tuesday-Thursday- Saturday -Sunday     HECTOROL IV     HYDROcodone-acetaminophen  mg per tablet  Commonly known as: NORCO  Take 1 tablet by mouth 3 (three) times daily as needed for Pain.     LORazepam 0.5 MG tablet  Commonly known as: ATIVAN  Take 1 tablet (0.5 mg total) by mouth every 8 (eight) hours as needed for Anxiety (use 1-2 before dialysis for anxiety, prn bedtime).     megestroL 20 MG Tab  Commonly known as: MEGACE  Take 1 tablet (20 mg total) by mouth 2 (two) times daily.     midodrine 10 MG tablet  Commonly  known as: PROAMATINE  Take 1 tablet (10 mg total) by mouth in the morning and 1 tablet (10 mg total) in the evening and 1 tablet (10 mg total) before bedtime.     mirtazapine 7.5 MG Tab  Commonly known as: REMERON  Take 1 tablet (7.5 mg total) by mouth every evening.     ondansetron 4 mg/2 mL Soln     PulmosaL 7 % nebulizer solution  Generic drug: sodium chloride 7%  Take 4 mLs by nebulization 2 (two) times a day.     RENAL-RADHA 0.8 mg Tab  Generic drug: B complex-vitamin C-folic acid     sevelamer carbonate 800 mg Tab  Commonly known as: RENVELA  Take 1 tablet by mouth 3 times a day     TRELEGY ELLIPTA 200-62.5-25 mcg inhaler  Generic drug: fluticasone-umeclidin-vilanter  Inhale 1 puff into the lungs once daily.               Where to Get Your Medications        These medications were sent to [facility] [address], [place] [address]      Hours: Mon-Fri, 8a-5:30p Phone: [phone]   predniSONE 20 MG tablet          Thank you  Poli Pretty, PharmD

## 2023-10-09 NOTE — PT/OT/SLP EVAL
Occupational Therapy   Evaluation/Treatment     Name: Katie Quevedo  MRN: [id number]  Admitting Diagnosis: Acute on chronic respiratory failure with hypoxia  Recent Surgery: * No surgery found *      Recommendations:     Discharge Recommendations:  (low intensity)  Discharge Equipment Recommendations:  bedside commode  Barriers to discharge:  None    Assessment:     Katie Quevedo is a 78 y.o. female with a medical diagnosis of Acute on chronic respiratory failure with hypoxia.  She presents with The primary encounter diagnosis was COPD exacerbation. Diagnoses of Chest pain and Acute on chronic respiratory failure with hypoxia were also pertinent to this visit.  . Performance deficits affecting function: weakness, impaired self care skills, impaired functional mobility, gait instability, impaired balance, pain, impaired cardiopulmonary response to activity, decreased safety awareness, impaired endurance.      Pt would benefit from cont OT services in order to maximize functional independence. Recommending HHOT/PT at d/c with [facility].     Rehab Prognosis: Good; patient would benefit from acute skilled OT services to address these deficits and reach maximum level of function.       Plan:     Patient to be seen 5 x/week to address the above listed problems via self-care/home management, therapeutic activities, therapeutic exercises  Plan of Care Expires: 11/09/23  Plan of Care Reviewed with: patient    Subjective     Chief Complaint: pt reports back pain; SOB; impaired endurance   Patient/Family Comments/goals: return home     Occupational Profile:  Living Environment: with spouse in H, thres to enter, walk in tub with built in seat   Previous level of function: mod I toileting, bed mobility; caregiver 5x/wk 9A-5P who assists with bathing and dsg; mostly sedentary - will only ambulate to bathroom and back to bed 2/2 SOB; wears 3L O2 24/7   Equipment Used at Home: rollator, walker, rolling, power chair  Assistance upon  Discharge: from spouse and caregiver     Pain/Comfort:  Pain Rating 1: 7/10  Location - Side 1: Bilateral  Location - Orientation 1: generalized  Location 1: back  Pain Addressed 1: Reposition, Distraction, Cessation of Activity  Pain Rating Post-Intervention 1:  (did not rate)    Patients cultural, spiritual, [employer]eterian conflicts given the current situation:      Objective:     Communicated with: jeremias prior to session.  Patient found supine with   upon OT entry to room.    General Precautions: Standard, fall  Orthopedic Precautions: N/A  Braces: N/A  Respiratory Status: Nasal cannula, flow 2.5 L/min    Occupational Performance:    Bed Mobility:    Patient completed Scooting/Bridging with stand by assistance  Patient completed Supine to Sit with stand by assistance  Patient completed Sit to Supine with stand by assistance    Functional Mobility/Transfers:  Patient completed Sit <> Stand Transfer with contact guard assistance  with  rolling walker   Functional Mobility: CGA lateral steps to HOB with RW     Activities of Daily Living:  N/A     Cognitive/Visual Perceptual:  Cognitive/Psychosocial Skills:     -       Oriented to: Person, Place, Time, and Situation   -       Follows Commands/attention:Follows multistep  commands  -       Communication: clear/fluent  -       Memory: No Deficits noted  -       Safety awareness/insight to disability: impaired   -       Mood/Affect/Coping skills/emotional control: Appropriate to situation    Physical Exam:  Balance:    -       WFL seated; fair standing   Postural examination/scapula alignment:    -       Rounded shoulders  Dominant hand:    -       right  Upper Extremity Range of Motion:   BUE WFL   Upper Extremity Strength:  BUE grossly 4-/5    Strength:  WFL     AMPAC 6 Click ADL:  AMPAC Total Score: 16    Treatment & Education:  Pt found supine  Axs as above  O2 monitored during session; 2.5 LO2-  to one teens while EOB; O2 lowest 93%  Discussed home safety,  energy conservation techs, PLB (pt with poor performance of tech), and recs for BSC   Stood x 1 trial with RW; forward/back and lateral steps performed to HOB   Returned to supine 2/2 SOB and impaired endurance   Dicussed impt of EOB axs at home in order to improve respiratory issues and improve endurance     Patient left supine with all lines intact, call button in reach, bed alarm on, nsg notified, and spouse  present    GOALS:   Multidisciplinary Problems       Occupational Therapy Goals          Problem: Occupational Therapy    Goal Priority Disciplines Outcome Interventions   Occupational Therapy Goal     OT, PT/OT Ongoing, Progressing    Description: Goals to be met by: 11/9/23     Patient will increase functional independence with ADLs by performing:    Grooming while seated with Modified [place].  Toileting from toilet with Modified [place] for hygiene and clothing management.   Supine to sit with Modified [place].  Step transfer with Modified [place]  Toilet transfer to toilet with Modified [place].  Increased functional strength to WFL for self care skills and functional mobility.  Upper extremity exercise program x10 reps per handout, with independence.                         History:     Past Medical History:   Diagnosis Date    Acute congestive heart failure 02/10/2020    Anemia     Bilateral renal cysts     Cataract     Chronic LBP 7/26/2012    Chronic pain     CKD (chronic kidney disease), stage IV     Colon polyp 2013    COPD (chronic obstructive pulmonary disease)     Dehydration     Encounter for blood transfusion     HTN (hypertension)     Lumbar spondylosis     Melanoma     of the lip    Metabolic bone disease     Migraines, neuralgic     Osteoporosis     Primary osteoarthritis of both knees     s/p Rt TKA    Pulmonary embolism with infarction     Seizures 1972    x1 only    Subdeltoid bursitis, L>R. 9/27/2012    Ulcer     Vitamin D deficiency disease          Past  Surgical History:   Procedure Laterality Date    BLADDER SUSPENSION      CATARACT EXTRACTION  11/18/13    left eye    CERVICAL LAMINECTOMY      x3, fusion x1    COLONOSCOPY  2009    DECLOTTING OF ARTERIOVENOUS GRAFT Left 1/3/2022    Procedure: [id number];  Surgeon: Lindsey Louie MD;  Location: [facility] OR;  Service: Vascular;  Laterality: Left;    DECLOTTING OF ARTERIOVENOUS GRAFT Left 2/8/2022    Procedure: [id number];  Surgeon: Lindsey Louie MD;  Location: [facility] OR;  Service: Vascular;  Laterality: Left;    DECLOTTING OF ARTERIOVENOUS GRAFT Left 4/8/2022    Procedure: [id number];  Surgeon: Lindsey Louie MD;  Location: [facility] OR;  Service: Vascular;  Laterality: Left;    FISTULOGRAM N/A 2/27/2020    Procedure: Fistulogram;  Surgeon: Noe Benitez MD;  Location: [facility] CATH LAB/EP;  Service: Cardiology;  Laterality: N/A;    HYSTERECTOMY      JOINT REPLACEMENT  2001    total right knee     LUMBAR LAMINECTOMY      x 3, fusion x1    OOPHORECTOMY      PERIPHERAL ANGIOGRAPHY N/A 3/9/2020    Procedure: Peripheral angiography;  Surgeon: Jacinto Henriquez MD;  Location: [facility] CATH LAB/EP;  Service: Cardiology;  Laterality: N/A;    PLACEMENT OF ARTERIOVENOUS GRAFT Left 1/21/2020    Procedure: INSERTION, GRAFT, ARTERIOVENOUS;  Surgeon: Lindsey Louie MD;  Location: [facility] OR;  Service: General;  Laterality: Left;    PLACEMENT OF ARTERIOVENOUS GRAFT Right 7/22/2022    Procedure: INSERTION, GRAFT, ARTERIOVENOUS;  Surgeon: Lindsey Louie MD;  Location: [facility] OR;  Service: General;  Laterality: Right;    PLACEMENT OF DUAL-LUMEN VASCULAR CATHETER Right 4/16/2022    Procedure: INSERTION-CATHETER-RICKI;  Surgeon: Lindsey Louie MD;  Location: [facility] OR;  Service: General;  Laterality: Right;    THROMBECTOMY Left 2/3/2020    Procedure: THROMBECTOMY;  Surgeon: Lindsey Louie MD;  Location: [facility] OR;  Service: General;  Laterality: Left;    THROMBECTOMY  3/9/2020    Procedure:  Thrombectomy;  Surgeon: Jacinto Henriquez MD;  Location: [facility] CATH LAB/EP;  Service: Cardiology;;    THROMBECTOMY Left 4/7/2022    Procedure: THROMBECTOMY;  Surgeon: Lindsey Louie MD;  Location: [facility] OR;  Service: General;  Laterality: Left;       Time Tracking:     OT Date of Treatment: 10/09/23  OT Start Time: 1043  OT Stop Time: 1106  OT Total Time (min): 23 min    Billable Minutes:Evaluation 10  Therapeutic Activity 13    10/9/2023

## 2023-10-10 ENCOUNTER — PES CALL (OUTPATIENT)
Dept: HOME HEALTH SERVICES | Facility: CLINIC | Age: 78
End: 2023-10-10
Payer: MEDICARE

## 2023-10-10 NOTE — PLAN OF CARE
[facility] - Telemetry  Discharge Final Note    Primary Care Provider: Kingston Verduzco MD    Expected Discharge Date: 10/9/2023    Final Discharge Note (most recent)       Final Note - 10/10/23 0740          Final Note    Assessment Type Discharge Planning Assessment (P)      Anticipated Discharge Disposition Home-Health Care Svc (P)    [place]    Hospital Resources/Appts/Education Provided Appointments scheduled and added to AVS (P)         Post-Acute Status    Post-Acute Authorization Home Health (P)      Home Health Status Set-up Complete/Auth obtained (P)    [place]                      Contact Info       Katharine Urbina NP   Specialty: Internal Medicine    [address] [facility] [address]   Phone: [phone]       Next Steps: Follow up    Instructions: Patient will be notified of a hospital follow up home visit    [facility] [address]   Phone: [phone]       Next Steps: Follow up    Instructions: will provide home health services

## 2023-10-12 ENCOUNTER — PATIENT MESSAGE (OUTPATIENT)
Dept: RESPIRATORY THERAPY | Facility: HOSPITAL | Age: 78
End: 2023-10-12
Payer: MEDICARE

## 2023-10-12 ENCOUNTER — HOSPITAL ENCOUNTER (INPATIENT)
Facility: HOSPITAL | Age: 78
LOS: 15 days | Discharge: HOME HEALTH | DRG: 189 | End: 2023-10-27
Attending: EMERGENCY MEDICINE | Admitting: INTERNAL MEDICINE
Payer: MEDICARE

## 2023-10-12 DIAGNOSIS — F43.23 ADJUSTMENT REACTION WITH ANXIETY AND DEPRESSION: ICD-10-CM

## 2023-10-12 DIAGNOSIS — J44.1 CHRONIC OBSTRUCTIVE PULMONARY DISEASE WITH ACUTE EXACERBATION: Primary | ICD-10-CM

## 2023-10-12 DIAGNOSIS — J96.21 ACUTE ON CHRONIC RESPIRATORY FAILURE WITH HYPOXIA: ICD-10-CM

## 2023-10-12 DIAGNOSIS — I95.3 HEMODIALYSIS-ASSOCIATED HYPOTENSION: ICD-10-CM

## 2023-10-12 DIAGNOSIS — J44.1 COPD EXACERBATION: ICD-10-CM

## 2023-10-12 DIAGNOSIS — Z99.2 ESRD (END STAGE RENAL DISEASE) ON DIALYSIS: Chronic | ICD-10-CM

## 2023-10-12 DIAGNOSIS — R06.00 DYSPNEA: ICD-10-CM

## 2023-10-12 DIAGNOSIS — R54 FRAILTY SYNDROME IN GERIATRIC PATIENT: ICD-10-CM

## 2023-10-12 DIAGNOSIS — R07.9 ACUTE CHEST PAIN: ICD-10-CM

## 2023-10-12 DIAGNOSIS — R06.02 SOB (SHORTNESS OF BREATH): ICD-10-CM

## 2023-10-12 DIAGNOSIS — J10.1 INFLUENZA A VIRUS PRESENT: ICD-10-CM

## 2023-10-12 DIAGNOSIS — N18.6 ESRD (END STAGE RENAL DISEASE) ON DIALYSIS: Chronic | ICD-10-CM

## 2023-10-12 DIAGNOSIS — R06.02 SHORTNESS OF BREATH: ICD-10-CM

## 2023-10-12 LAB
ALLENS TEST: ABNORMAL
ANION GAP SERPL CALC-SCNC: 16 MMOL/L (ref 8–16)
BASOPHILS # BLD AUTO: 0.03 K/UL (ref 0–0.2)
BASOPHILS NFR BLD: 0.2 % (ref 0–1.9)
BNP SERPL-MCNC: 846 PG/ML (ref 0–99)
BUN SERPL-MCNC: 36 MG/DL (ref 8–23)
CALCIUM SERPL-MCNC: 9.1 MG/DL (ref 8.7–10.5)
CHLORIDE SERPL-SCNC: 100 MMOL/L (ref 95–110)
CO2 SERPL-SCNC: 23 MMOL/L (ref 23–29)
CREAT SERPL-MCNC: 4.3 MG/DL (ref 0.5–1.4)
CTP QC/QA: YES
CTP QC/QA: YES
DIFFERENTIAL METHOD: ABNORMAL
EOSINOPHIL # BLD AUTO: 0.5 K/UL (ref 0–0.5)
EOSINOPHIL NFR BLD: 3 % (ref 0–8)
ERYTHROCYTE [DISTWIDTH] IN BLOOD BY AUTOMATED COUNT: 14.6 % (ref 11.5–14.5)
EST. GFR  (NO RACE VARIABLE): 10 ML/MIN/1.73 M^2
FIO2: 40 %
GLUCOSE SERPL-MCNC: 98 MG/DL (ref 70–110)
HCT VFR BLD AUTO: 37.1 % (ref 37–48.5)
HCT VFR BLD CALC: 35.8 % (ref 36–54)
HGB BLD-MCNC: 11.7 G/DL (ref 9–18)
HGB BLD-MCNC: 11.9 G/DL (ref 12–16)
IMM GRANULOCYTES # BLD AUTO: 0.14 K/UL (ref 0–0.04)
IMM GRANULOCYTES NFR BLD AUTO: 0.9 % (ref 0–0.5)
INFLUENZA A, MOLECULAR: NEGATIVE
INFLUENZA B, MOLECULAR: NEGATIVE
LACTATE SERPL-SCNC: 1.3 MMOL/L (ref 0.5–2.2)
LDH SERPL L TO P-CCNC: 1.5 MMOL/L (ref 0.5–2.2)
LYMPHOCYTES # BLD AUTO: 1.6 K/UL (ref 1–4.8)
LYMPHOCYTES NFR BLD: 10 % (ref 18–48)
MAGNESIUM SERPL-MCNC: 2.1 MG/DL (ref 1.6–2.6)
MAGNESIUM SERPL-MCNC: 2.2 MG/DL (ref 1.6–2.6)
MCH RBC QN AUTO: 32.8 PG (ref 27–31)
MCHC RBC AUTO-ENTMCNC: 32.1 G/DL (ref 32–36)
MCV RBC AUTO: 102 FL (ref 82–98)
MONOCYTES # BLD AUTO: 1.4 K/UL (ref 0.3–1)
MONOCYTES NFR BLD: 8.3 % (ref 4–15)
NEUTROPHILS # BLD AUTO: 12.6 K/UL (ref 1.8–7.7)
NEUTROPHILS NFR BLD: 77.6 % (ref 38–73)
NRBC BLD-RTO: 0 /100 WBC
PCO2 BLDA: 59.1 MMHG (ref 35–45)
PEEP: 5
PH SMN: 7.29 [PH] (ref 7.35–7.45)
PLATELET # BLD AUTO: 199 K/UL (ref 150–450)
PMV BLD AUTO: 9.7 FL (ref 9.2–12.9)
PO2 BLDA: 17.7 MMHG (ref 40–60)
POC BASE DEFICIT: 0.8 MMOL/L (ref -2–2)
POC HCO3: 28.4 MMOL/L (ref 24–28)
POC IONIZED CALCIUM: 1.15 MMOL/L (ref 1.06–1.42)
POC MOLECULAR INFLUENZA A AGN: NEGATIVE
POC MOLECULAR INFLUENZA B AGN: NEGATIVE
POC PERFORMED BY: ABNORMAL
POC SATURATED O2: 20.8 % (ref 95–100)
POCT GLUCOSE: 117 MG/DL (ref 70–110)
POTASSIUM BLD-SCNC: 4.5 MMOL/L (ref 3.5–5.1)
POTASSIUM SERPL-SCNC: 4.6 MMOL/L (ref 3.5–5.1)
PROCALCITONIN SERPL IA-MCNC: 1.44 NG/ML
RBC # BLD AUTO: 3.63 M/UL (ref 4–5.4)
SARS-COV-2 RDRP RESP QL NAA+PROBE: NEGATIVE
SODIUM BLD-SCNC: 141 MMOL/L (ref 136–145)
SODIUM SERPL-SCNC: 139 MMOL/L (ref 136–145)
SPECIMEN SOURCE: ABNORMAL
SPECIMEN SOURCE: NORMAL
TROPONIN I SERPL DL<=0.01 NG/ML-MCNC: 0.13 NG/ML (ref 0–0.03)
TROPONIN I SERPL DL<=0.01 NG/ML-MCNC: 0.17 NG/ML (ref 0–0.03)
WBC # BLD AUTO: 16.24 K/UL (ref 3.9–12.7)

## 2023-10-12 PROCEDURE — 87798 DETECT AGENT NOS DNA AMP: CPT | Mod: 59,HCNC

## 2023-10-12 PROCEDURE — 25000003 PHARM REV CODE 250: Mod: HCNC | Performed by: HOSPITALIST

## 2023-10-12 PROCEDURE — 51798 US URINE CAPACITY MEASURE: CPT | Mod: HCNC

## 2023-10-12 PROCEDURE — 25000242 PHARM REV CODE 250 ALT 637 W/ HCPCS: Mod: HCNC | Performed by: EMERGENCY MEDICINE

## 2023-10-12 PROCEDURE — 87205 SMEAR GRAM STAIN: CPT | Mod: HCNC | Performed by: INTERNAL MEDICINE

## 2023-10-12 PROCEDURE — 94660 CPAP INITIATION&MGMT: CPT | Mod: HCNC

## 2023-10-12 PROCEDURE — 83735 ASSAY OF MAGNESIUM: CPT | Mod: HCNC | Performed by: EMERGENCY MEDICINE

## 2023-10-12 PROCEDURE — 83880 ASSAY OF NATRIURETIC PEPTIDE: CPT | Mod: HCNC | Performed by: EMERGENCY MEDICINE

## 2023-10-12 PROCEDURE — 84484 ASSAY OF TROPONIN QUANT: CPT | Mod: HCNC | Performed by: EMERGENCY MEDICINE

## 2023-10-12 PROCEDURE — 94644 CONT INHLJ TX 1ST HOUR: CPT | Mod: HCNC

## 2023-10-12 PROCEDURE — 83735 ASSAY OF MAGNESIUM: CPT | Mod: 91,HCNC | Performed by: INTERNAL MEDICINE

## 2023-10-12 PROCEDURE — 94640 AIRWAY INHALATION TREATMENT: CPT | Mod: HCNC

## 2023-10-12 PROCEDURE — 85025 COMPLETE CBC W/AUTO DIFF WBC: CPT | Mod: HCNC | Performed by: EMERGENCY MEDICINE

## 2023-10-12 PROCEDURE — 87635 SARS-COV-2 COVID-19 AMP PRB: CPT | Mod: HCNC | Performed by: EMERGENCY MEDICINE

## 2023-10-12 PROCEDURE — 25000242 PHARM REV CODE 250 ALT 637 W/ HCPCS: Mod: HCNC | Performed by: STUDENT IN AN ORGANIZED HEALTH CARE EDUCATION/TRAINING PROGRAM

## 2023-10-12 PROCEDURE — 93010 EKG 12-LEAD: ICD-10-PCS | Mod: HCNC,,, | Performed by: INTERNAL MEDICINE

## 2023-10-12 PROCEDURE — 63600175 PHARM REV CODE 636 W HCPCS: Mod: HCNC | Performed by: EMERGENCY MEDICINE

## 2023-10-12 PROCEDURE — 87502 INFLUENZA DNA AMP PROBE: CPT | Mod: HCNC | Performed by: INTERNAL MEDICINE

## 2023-10-12 PROCEDURE — 63600175 PHARM REV CODE 636 W HCPCS: Mod: HCNC | Performed by: INTERNAL MEDICINE

## 2023-10-12 PROCEDURE — 87070 CULTURE OTHR SPECIMN AEROBIC: CPT | Mod: HCNC | Performed by: INTERNAL MEDICINE

## 2023-10-12 PROCEDURE — 25500020 PHARM REV CODE 255: Mod: HCNC | Performed by: INTERNAL MEDICINE

## 2023-10-12 PROCEDURE — 94640 AIRWAY INHALATION TREATMENT: CPT | Mod: HCNC,XB

## 2023-10-12 PROCEDURE — 84484 ASSAY OF TROPONIN QUANT: CPT | Mod: 91,HCNC | Performed by: INTERNAL MEDICINE

## 2023-10-12 PROCEDURE — 99900035 HC TECH TIME PER 15 MIN (STAT): Mod: HCNC

## 2023-10-12 PROCEDURE — 87502 INFLUENZA DNA AMP PROBE: CPT | Mod: HCNC

## 2023-10-12 PROCEDURE — 83605 ASSAY OF LACTIC ACID: CPT | Mod: HCNC | Performed by: INTERNAL MEDICINE

## 2023-10-12 PROCEDURE — 93010 ELECTROCARDIOGRAM REPORT: CPT | Mod: HCNC,,, | Performed by: INTERNAL MEDICINE

## 2023-10-12 PROCEDURE — 87040 BLOOD CULTURE FOR BACTERIA: CPT | Mod: HCNC | Performed by: INTERNAL MEDICINE

## 2023-10-12 PROCEDURE — 84145 PROCALCITONIN (PCT): CPT | Mod: HCNC | Performed by: INTERNAL MEDICINE

## 2023-10-12 PROCEDURE — 25000003 PHARM REV CODE 250: Mod: HCNC | Performed by: INTERNAL MEDICINE

## 2023-10-12 PROCEDURE — 27100171 HC OXYGEN HIGH FLOW UP TO 24 HOURS: Mod: HCNC

## 2023-10-12 PROCEDURE — 99291 CRITICAL CARE FIRST HOUR: CPT | Mod: HCNC

## 2023-10-12 PROCEDURE — 93005 ELECTROCARDIOGRAM TRACING: CPT | Mod: HCNC

## 2023-10-12 PROCEDURE — 20000000 HC ICU ROOM: Mod: HCNC

## 2023-10-12 PROCEDURE — 80048 BASIC METABOLIC PNL TOTAL CA: CPT | Mod: HCNC | Performed by: EMERGENCY MEDICINE

## 2023-10-12 PROCEDURE — 96372 THER/PROPH/DIAG INJ SC/IM: CPT | Performed by: INTERNAL MEDICINE

## 2023-10-12 PROCEDURE — 36415 COLL VENOUS BLD VENIPUNCTURE: CPT | Mod: HCNC | Performed by: INTERNAL MEDICINE

## 2023-10-12 PROCEDURE — 94761 N-INVAS EAR/PLS OXIMETRY MLT: CPT | Mod: HCNC

## 2023-10-12 PROCEDURE — 27000190 HC CPAP FULL FACE MASK W/VALVE: Mod: HCNC

## 2023-10-12 PROCEDURE — 82803 BLOOD GASES ANY COMBINATION: CPT | Mod: HCNC

## 2023-10-12 PROCEDURE — 25000242 PHARM REV CODE 250 ALT 637 W/ HCPCS: Mod: HCNC | Performed by: INTERNAL MEDICINE

## 2023-10-12 PROCEDURE — 96374 THER/PROPH/DIAG INJ IV PUSH: CPT | Mod: HCNC

## 2023-10-12 PROCEDURE — 87633 RESP VIRUS 12-25 TARGETS: CPT | Mod: HCNC

## 2023-10-12 RX ORDER — SODIUM CHLORIDE 0.9 % (FLUSH) 0.9 %
2 SYRINGE (ML) INJECTION
Status: DISCONTINUED | OUTPATIENT
Start: 2023-10-12 | End: 2023-10-27 | Stop reason: HOSPADM

## 2023-10-12 RX ORDER — MIDODRINE HYDROCHLORIDE 5 MG/1
10 TABLET ORAL
Status: DISCONTINUED | OUTPATIENT
Start: 2023-10-12 | End: 2023-10-13

## 2023-10-12 RX ORDER — FAMOTIDINE 10 MG/ML
20 INJECTION INTRAVENOUS DAILY
Status: DISCONTINUED | OUTPATIENT
Start: 2023-10-12 | End: 2023-10-12

## 2023-10-12 RX ORDER — PREDNISONE 20 MG/1
40 TABLET ORAL DAILY
Status: DISCONTINUED | OUTPATIENT
Start: 2023-10-12 | End: 2023-10-12

## 2023-10-12 RX ORDER — SODIUM CHLORIDE FOR INHALATION 3 %
4 VIAL, NEBULIZER (ML) INHALATION
Status: DISCONTINUED | OUTPATIENT
Start: 2023-10-12 | End: 2023-10-16

## 2023-10-12 RX ORDER — MUPIROCIN 20 MG/G
OINTMENT TOPICAL 2 TIMES DAILY
Status: COMPLETED | OUTPATIENT
Start: 2023-10-12 | End: 2023-10-15

## 2023-10-12 RX ORDER — FLUTICASONE FUROATE AND VILANTEROL 100; 25 UG/1; UG/1
1 POWDER RESPIRATORY (INHALATION) DAILY
Status: DISCONTINUED | OUTPATIENT
Start: 2023-10-12 | End: 2023-10-12

## 2023-10-12 RX ORDER — HEPARIN SODIUM 5000 [USP'U]/ML
5000 INJECTION, SOLUTION INTRAVENOUS; SUBCUTANEOUS EVERY 8 HOURS
Status: DISCONTINUED | OUTPATIENT
Start: 2023-10-12 | End: 2023-10-12

## 2023-10-12 RX ORDER — IPRATROPIUM BROMIDE 0.5 MG/2.5ML
0.5 SOLUTION RESPIRATORY (INHALATION)
Status: COMPLETED | OUTPATIENT
Start: 2023-10-12 | End: 2023-10-12

## 2023-10-12 RX ORDER — BUSPIRONE HYDROCHLORIDE 5 MG/1
10 TABLET ORAL
Status: DISCONTINUED | OUTPATIENT
Start: 2023-10-12 | End: 2023-10-12

## 2023-10-12 RX ORDER — FLUTICASONE FUROATE AND VILANTEROL 200; 25 UG/1; UG/1
1 POWDER RESPIRATORY (INHALATION) DAILY
Status: DISCONTINUED | OUTPATIENT
Start: 2023-10-13 | End: 2023-10-27 | Stop reason: HOSPADM

## 2023-10-12 RX ORDER — MEGESTROL ACETATE 20 MG/1
20 TABLET ORAL 2 TIMES DAILY
Status: DISCONTINUED | OUTPATIENT
Start: 2023-10-12 | End: 2023-10-27 | Stop reason: HOSPADM

## 2023-10-12 RX ORDER — SEVELAMER CARBONATE 800 MG/1
800 TABLET, FILM COATED ORAL
Status: DISCONTINUED | OUTPATIENT
Start: 2023-10-12 | End: 2023-10-27 | Stop reason: HOSPADM

## 2023-10-12 RX ORDER — FUROSEMIDE 40 MG/1
80 TABLET ORAL 2 TIMES DAILY
Status: DISCONTINUED | OUTPATIENT
Start: 2023-10-12 | End: 2023-10-12

## 2023-10-12 RX ORDER — TALC
6 POWDER (GRAM) TOPICAL NIGHTLY PRN
Status: DISCONTINUED | OUTPATIENT
Start: 2023-10-12 | End: 2023-10-27 | Stop reason: HOSPADM

## 2023-10-12 RX ORDER — SODIUM CHLORIDE 9 MG/ML
INJECTION, SOLUTION INTRAVENOUS
Status: DISCONTINUED | OUTPATIENT
Start: 2023-10-12 | End: 2023-10-16

## 2023-10-12 RX ORDER — IPRATROPIUM BROMIDE AND ALBUTEROL SULFATE 2.5; .5 MG/3ML; MG/3ML
3 SOLUTION RESPIRATORY (INHALATION) EVERY 4 HOURS PRN
Status: DISCONTINUED | OUTPATIENT
Start: 2023-10-12 | End: 2023-10-12

## 2023-10-12 RX ORDER — IPRATROPIUM BROMIDE AND ALBUTEROL SULFATE 2.5; .5 MG/3ML; MG/3ML
3 SOLUTION RESPIRATORY (INHALATION) EVERY 8 HOURS
Status: DISCONTINUED | OUTPATIENT
Start: 2023-10-12 | End: 2023-10-12

## 2023-10-12 RX ORDER — HEPARIN SODIUM 1000 [USP'U]/ML
4100 INJECTION, SOLUTION INTRAVENOUS; SUBCUTANEOUS
Status: DISCONTINUED | OUTPATIENT
Start: 2023-10-12 | End: 2023-10-27 | Stop reason: HOSPADM

## 2023-10-12 RX ORDER — HEPARIN SODIUM 5000 [USP'U]/ML
5000 INJECTION, SOLUTION INTRAVENOUS; SUBCUTANEOUS EVERY 12 HOURS
Status: DISCONTINUED | OUTPATIENT
Start: 2023-10-12 | End: 2023-10-27 | Stop reason: HOSPADM

## 2023-10-12 RX ORDER — ALBUTEROL SULFATE 2.5 MG/.5ML
2.5 SOLUTION RESPIRATORY (INHALATION) EVERY 4 HOURS PRN
Status: DISCONTINUED | OUTPATIENT
Start: 2023-10-12 | End: 2023-10-27 | Stop reason: HOSPADM

## 2023-10-12 RX ORDER — HYDROCODONE BITARTRATE AND ACETAMINOPHEN 10; 325 MG/1; MG/1
1 TABLET ORAL 3 TIMES DAILY PRN
Status: DISCONTINUED | OUTPATIENT
Start: 2023-10-12 | End: 2023-10-19

## 2023-10-12 RX ORDER — LORAZEPAM 0.5 MG/1
0.5 TABLET ORAL EVERY 8 HOURS PRN
Status: DISCONTINUED | OUTPATIENT
Start: 2023-10-12 | End: 2023-10-12

## 2023-10-12 RX ORDER — ACETAMINOPHEN 325 MG/1
650 TABLET ORAL EVERY 4 HOURS PRN
Status: DISCONTINUED | OUTPATIENT
Start: 2023-10-12 | End: 2023-10-27 | Stop reason: HOSPADM

## 2023-10-12 RX ORDER — BUSPIRONE HYDROCHLORIDE 5 MG/1
10 TABLET ORAL
Status: DISCONTINUED | OUTPATIENT
Start: 2023-10-13 | End: 2023-10-27 | Stop reason: HOSPADM

## 2023-10-12 RX ORDER — MIRTAZAPINE 7.5 MG/1
7.5 TABLET, FILM COATED ORAL NIGHTLY
Status: DISCONTINUED | OUTPATIENT
Start: 2023-10-12 | End: 2023-10-27 | Stop reason: HOSPADM

## 2023-10-12 RX ORDER — ALBUTEROL SULFATE 2.5 MG/.5ML
2.5 SOLUTION RESPIRATORY (INHALATION)
Status: DISCONTINUED | OUTPATIENT
Start: 2023-10-12 | End: 2023-10-16

## 2023-10-12 RX ORDER — IPRATROPIUM BROMIDE AND ALBUTEROL SULFATE 2.5; .5 MG/3ML; MG/3ML
3 SOLUTION RESPIRATORY (INHALATION) EVERY 4 HOURS
Status: DISCONTINUED | OUTPATIENT
Start: 2023-10-12 | End: 2023-10-27 | Stop reason: HOSPADM

## 2023-10-12 RX ORDER — SODIUM CHLORIDE 9 MG/ML
INJECTION, SOLUTION INTRAVENOUS
Status: DISCONTINUED | OUTPATIENT
Start: 2023-10-12 | End: 2023-10-27 | Stop reason: HOSPADM

## 2023-10-12 RX ORDER — PREDNISONE 20 MG/1
40 TABLET ORAL DAILY
Status: DISPENSED | OUTPATIENT
Start: 2023-10-12 | End: 2023-10-17

## 2023-10-12 RX ORDER — SODIUM CHLORIDE 0.9 % (FLUSH) 0.9 %
10 SYRINGE (ML) INJECTION
Status: DISCONTINUED | OUTPATIENT
Start: 2023-10-12 | End: 2023-10-27 | Stop reason: HOSPADM

## 2023-10-12 RX ORDER — FUROSEMIDE 40 MG/1
80 TABLET ORAL 2 TIMES DAILY
Status: DISCONTINUED | OUTPATIENT
Start: 2023-10-13 | End: 2023-10-13

## 2023-10-12 RX ORDER — ALBUTEROL SULFATE 2.5 MG/.5ML
10 SOLUTION RESPIRATORY (INHALATION)
Status: COMPLETED | OUTPATIENT
Start: 2023-10-12 | End: 2023-10-12

## 2023-10-12 RX ADMIN — METHYLPREDNISOLONE SODIUM SUCCINATE 80 MG: 40 INJECTION, POWDER, FOR SOLUTION INTRAMUSCULAR; INTRAVENOUS at 12:10

## 2023-10-12 RX ADMIN — IOHEXOL 100 ML: 350 INJECTION, SOLUTION INTRAVENOUS at 05:10

## 2023-10-12 RX ADMIN — SODIUM CHLORIDE: 9 INJECTION, SOLUTION INTRAVENOUS at 04:10

## 2023-10-12 RX ADMIN — CEFTRIAXONE SODIUM 2 G: 2 INJECTION, POWDER, FOR SOLUTION INTRAMUSCULAR; INTRAVENOUS at 04:10

## 2023-10-12 RX ADMIN — Medication 6 MG: at 11:10

## 2023-10-12 RX ADMIN — IPRATROPIUM BROMIDE AND ALBUTEROL SULFATE 3 ML: 2.5; .5 SOLUTION RESPIRATORY (INHALATION) at 07:10

## 2023-10-12 RX ADMIN — SEVELAMER CARBONATE 800 MG: 800 TABLET, FILM COATED ORAL at 06:10

## 2023-10-12 RX ADMIN — IPRATROPIUM BROMIDE AND ALBUTEROL SULFATE 3 ML: 2.5; .5 SOLUTION RESPIRATORY (INHALATION) at 11:10

## 2023-10-12 RX ADMIN — AZITHROMYCIN MONOHYDRATE 500 MG: 500 INJECTION, POWDER, LYOPHILIZED, FOR SOLUTION INTRAVENOUS at 05:10

## 2023-10-12 RX ADMIN — IPRATROPIUM BROMIDE AND ALBUTEROL SULFATE 3 ML: 2.5; .5 SOLUTION RESPIRATORY (INHALATION) at 03:10

## 2023-10-12 RX ADMIN — VANCOMYCIN HYDROCHLORIDE 1000 MG: 1 INJECTION, POWDER, LYOPHILIZED, FOR SOLUTION INTRAVENOUS at 06:10

## 2023-10-12 RX ADMIN — BUSPIRONE HYDROCHLORIDE 10 MG: 5 TABLET ORAL at 04:10

## 2023-10-12 RX ADMIN — MUPIROCIN: 20 OINTMENT TOPICAL at 08:10

## 2023-10-12 RX ADMIN — ALBUTEROL SULFATE 10 MG: 2.5 SOLUTION RESPIRATORY (INHALATION) at 12:10

## 2023-10-12 RX ADMIN — HYDROCODONE BITARTRATE AND ACETAMINOPHEN 1 TABLET: 10; 325 TABLET ORAL at 04:10

## 2023-10-12 RX ADMIN — MIDODRINE HYDROCHLORIDE 10 MG: 5 TABLET ORAL at 04:10

## 2023-10-12 RX ADMIN — HEPARIN SODIUM 5000 UNITS: 5000 INJECTION INTRAVENOUS; SUBCUTANEOUS at 08:10

## 2023-10-12 RX ADMIN — HYDROCODONE BITARTRATE AND ACETAMINOPHEN 1 TABLET: 10; 325 TABLET ORAL at 11:10

## 2023-10-12 RX ADMIN — PREDNISONE 40 MG: 20 TABLET ORAL at 04:10

## 2023-10-12 RX ADMIN — FAMOTIDINE 20 MG: 10 INJECTION, SOLUTION INTRAVENOUS at 04:10

## 2023-10-12 RX ADMIN — IPRATROPIUM BROMIDE 0.5 MG: 0.5 SOLUTION RESPIRATORY (INHALATION) at 12:10

## 2023-10-12 RX ADMIN — MEGESTROL ACETATE 20 MG: 20 TABLET ORAL at 08:10

## 2023-10-12 NOTE — PROGRESS NOTES
"Pharmacokinetic Initial Assessment: IV Vancomycin    Assessment/Plan:    Initiate intravenous vancomycin with loading dose of 1000 mg once with subsequent doses when random concentrations are less than 20 mcg/mL  Desired empiric serum trough concentration is 15 to 20 mcg/mL  Draw vancomycin random level on 10/13/23 at 1100.  Pharmacy will continue to follow and monitor vancomycin.      Please contact pharmacy at extension [phone] with any questions regarding this assessment.     Thank you for the consult,   Palma Romo       Patient brief summary:  Katie Quevedo is a 78 y.o. female initiated on antimicrobial therapy with IV Vancomycin for treatment of suspected lower respiratory infection    Drug Allergies:   Review of patient's allergies indicates:   Allergen Reactions    Aspirin      Other reaction(s): hx of ulcers    Tetracycline Swelling     Other reaction(s): Swelling    Penicillins Rash     Other reaction(s): Hives  Other reaction(s): Rash  Other reaction(s): Rash  Other reaction(s): Hives       Actual Body Weight:   41 kg    Renal Function:   Estimated Creatinine Clearance: 6.8 mL/min (A) (based on SCr of 4.3 mg/dL (H)).,     Dialysis Method (if applicable):  N/A    CBC (last 72 hours):  Recent Labs   Lab Result Units 10/12/23  1232   WBC K/uL 16.24*   Hemoglobin g/dL 11.9*   Hematocrit % 37.1   Platelets K/uL 199   Gran % % 77.6*   Lymph % % 10.0*   Mono % % 8.3   Eosinophil % % 3.0   Basophil % % 0.2   Differential Method  Automated       Metabolic Panel (last 72 hours):  Recent Labs   Lab Result Units 10/12/23  1232 10/12/23  1552   Sodium mmol/L 139  --    Potassium mmol/L 4.6  --    Chloride mmol/L 100  --    CO2 mmol/L 23  --    Glucose mg/dL 98  --    BUN mg/dL 36*  --    Creatinine mg/dL 4.3*  --    Magnesium mg/dL 2.2 2.1       Drug levels (last 3 results):  No results for input(s): "VANCOMYCINRA", "VANCORANDOM", "VANCOMYCINPE", "VANCOPEAK", "VANCOMYCINTR", "VANCOTROUGH" in the last 72 " hours.    Microbiologic Results:  Microbiology Results (last 7 days)       Procedure Component Value Units Date/Time    Culture, Respiratory with Gram Stain [[id number]]     Order Status: No result Specimen: Respiratory     Respiratory Infection Panel (PCR), Nasopharyngeal [[id number]]     Order Status: No result Specimen: Nasopharyngeal Swab     Blood culture (site 2) [[id number]] Collected: 10/12/23 1552    Order Status: Sent Specimen: Blood Updated: 10/12/23 1552    Blood culture (site 1) [[id number]] Collected: 10/12/23 1552    Order Status: Sent Specimen: Blood from Antecubital, Right Hand Updated: 10/12/23 1552

## 2023-10-12 NOTE — H&P
[facility] Medicine H&P Note     Attending Physician: Franchesca Tillman MD    Date of Admit: 10/12/2023    Chief Complaint     Shortness of Breath (Pt presents to the ED for sob. Pt presents on a neb mask at 8lpm .  Pt has been hospitalized recently for pneumonia)     Assessment/Plan:     Katie Quevedo is a 78 y.o. female with:    Acute Hypoxic and Hypercapnic Respiratory Failure, COPD  Multiple admissions for PNA and SOB   VS 97.7F 129 18 140/82 99%   Recent Labs     10/12/23  1248   PH 7.290*   PCO2 59.1*   PO2 17.7*   HCO3 28.4*   POCSATURATED 20.8*       CXR Grossly stable appearing cardiopulmonary findings noting pleural effusions and bilateral basilar subsegmental atelectasis.  Developing left lower lung zone consolidation not excluded.  Interstitial findings could reflect superimposed edema versus chronic change.  EKG Sinus tachycardia  with Left posterior fascicular block, Anterior infarct ,age undetermined   Started on BiPAP and weaned to 1 L NC  Procal 1.44, elevated from prior  Will cover vancomycin, ceftriaxone and azithromycin for now  Resp cx, BCx  Consult Pulm/Crit  Admit to ICU    Multiple Recent Hospital Admissions  Discussed recently decline in health with patient's daughter  Patient is a DNR  Brought up Palliative consult to daughter, she will consider it    Elevated Troponin  0.17-->repeat ordered  EKG without BERKLEY, changes from prior noted    ESRD on HD  Consult Nephrology  Spoke with Dr. Montemayor, she has been struggling to complete HD sessions over the last 6 weeks and has becoming more and more frail and tachycardic on the machine and there is some concern she will not be able to continue with dialysis  Also said it was okay to place a midline    Chronic Opiate Use  Will continue    DVT PPx: Heparin    Admit to inpatient under my care    Subjective:      History of Present Illness:  Katie Quevedo is a 78 y.o.  female who  has a past medical history of Acute congestive heart failure  (02/10/2020), Anemia, Bilateral renal cysts, Cataract, Chronic LBP (7/26/2012), Chronic pain, CKD (chronic kidney disease), stage IV, Colon polyp (2013), COPD (chronic obstructive pulmonary disease), Dehydration, Encounter for blood transfusion, HTN (hypertension), Lumbar spondylosis, Melanoma, Metabolic bone disease, Migraines, neuralgic, Osteoporosis, Primary osteoarthritis of both knees, Pulmonary embolism with infarction, Seizures (1972), Subdeltoid bursitis, L>R. (9/27/2012), Ulcer, and Vitamin D deficiency disease.. The patient [facility] on 10/12/2023 with a primary complaint of Shortness of Breath (Pt presents to the ED for sob. Pt presents on a neb mask at 8lpm .  Pt has been hospitalized recently for pneumonia)    The patient was in their usual state of health until 2 days ago when she was discharge from [facility] for respiratory failure 2/2 pneumonia, COPD. She has had 2 other hospital admissions in the last 2 weeks. She was barely able to finish her HD session yesterday. Spoke with Dr. Montemayor, she has been struggling to complete HD sessions over the last 6 weeks and has becoming more and more frail and tachycardic on the machine and there is some concern she will not be able to continue with dialysis. She asked to come to the ER today for worsening shortness of breath. She does not have BiPAP at home. Daughter has not observed any issues with swallowing or episodes concerning for aspiration. She is on chronic opiates, unclear if patient has been more sedated than usual at home.    Past Medical History:   Diagnosis Date    Acute congestive heart failure 02/10/2020    Anemia     Bilateral renal cysts     Cataract     Chronic LBP 7/26/2012    Chronic pain     CKD (chronic kidney disease), stage IV     Colon polyp 2013    COPD (chronic obstructive pulmonary disease)     Dehydration     Encounter for blood transfusion     HTN (hypertension)     Lumbar spondylosis      Melanoma     of the lip    Metabolic bone disease     Migraines, neuralgic     Osteoporosis     Primary osteoarthritis of both knees     s/p Rt TKA    Pulmonary embolism with infarction     Seizures 1972    x1 only    Subdeltoid bursitis, L>R. 9/27/2012    Ulcer     Vitamin D deficiency disease        Past Surgical History:   Procedure Laterality Date    BLADDER SUSPENSION      CATARACT EXTRACTION  11/18/13    left eye    CERVICAL LAMINECTOMY      x3, fusion x1    COLONOSCOPY  2009    DECLOTTING OF ARTERIOVENOUS GRAFT Left 1/3/2022    Procedure: [id number];  Surgeon: Lindsey Louie MD;  Location: [facility] OR;  Service: Vascular;  Laterality: Left;    DECLOTTING OF ARTERIOVENOUS GRAFT Left 2/8/2022    Procedure: [id number];  Surgeon: Lindsey Louie MD;  Location: [facility] OR;  Service: Vascular;  Laterality: Left;    DECLOTTING OF ARTERIOVENOUS GRAFT Left 4/8/2022    Procedure: [id number];  Surgeon: Lindsey Louie MD;  Location: [facility] OR;  Service: Vascular;  Laterality: Left;    FISTULOGRAM N/A 2/27/2020    Procedure: Fistulogram;  Surgeon: Noe Benitez MD;  Location: [facility] CATH LAB/EP;  Service: Cardiology;  Laterality: N/A;    HYSTERECTOMY      JOINT REPLACEMENT  2001    total right knee     LUMBAR LAMINECTOMY      x 3, fusion x1    OOPHORECTOMY      PERIPHERAL ANGIOGRAPHY N/A 3/9/2020    Procedure: Peripheral angiography;  Surgeon: Jacinto Henriquez MD;  Location: [facility] CATH LAB/EP;  Service: Cardiology;  Laterality: N/A;    PLACEMENT OF ARTERIOVENOUS GRAFT Left 1/21/2020    Procedure: INSERTION, GRAFT, ARTERIOVENOUS;  Surgeon: Lindsey Louie MD;  Location: [facility] OR;  Service: General;  Laterality: Left;    PLACEMENT OF ARTERIOVENOUS GRAFT Right 7/22/2022    Procedure: INSERTION, GRAFT, ARTERIOVENOUS;  Surgeon: Lindsey Louie MD;  Location: [facility] OR;  Service: General;  Laterality: Right;    PLACEMENT OF DUAL-LUMEN VASCULAR CATHETER Right 4/16/2022    Procedure:  INSERTION-CATHETER-RICKI;  Surgeon: Lindsey Louie MD;  Location: [facility] OR;  Service: General;  Laterality: Right;    THROMBECTOMY Left 2/3/2020    Procedure: THROMBECTOMY;  Surgeon: Lindsey Louie MD;  Location: [facility] OR;  Service: General;  Laterality: Left;    THROMBECTOMY  3/9/2020    Procedure: Thrombectomy;  Surgeon: Jacinto Henriquez MD;  Location: [facility] CATH LAB/EP;  Service: Cardiology;;    THROMBECTOMY Left 4/7/2022    Procedure: THROMBECTOMY;  Surgeon: Lindsey Louie MD;  Location: [facility] OR;  Service: General;  Laterality: Left;       Allergies:  Review of patient's allergies indicates:   Allergen Reactions    Aspirin      Other reaction(s): hx of ulcers    Tetracycline Swelling     Other reaction(s): Swelling    Penicillins Rash     Other reaction(s): Hives  Other reaction(s): Rash  Other reaction(s): Rash  Other reaction(s): Hives       Home Medications:  Prior to Admission medications    Medication Sig Start Date End Date Taking? Authorizing Provider   albuterol (PROVENTIL/VENTOLIN HFA) 90 mcg/actuation inhaler Inhale 2 puffs into the lungs every 6 (six) hours as needed for Wheezing. Rescue 6/29/23   Giancarlo Rust MD   albuterol-ipratropium (DUO-NEB) 2.5 mg-0.5 mg/3 mL nebulizer solution Take 3 mLs by nebulization every 6 (six) hours as needed for Shortness of Breath. Rescue 6/29/23 6/28/24  Giancarlo Rust MD   azelastine (ASTELIN) 137 mcg (0.1 %) nasal spray 1 spray (137 mcg total) by Nasal route 2 (two) times daily as needed for Rhinitis. 10/1/23 9/30/24  Moses Hernández MD   B complex-vitamin C-folic acid (RENAL-RADHA) 0.8 mg Tab Take 0.8 mg by mouth every Mon, Wed, Fri. 8/28/23   Provider, Historical   busPIRone (BUSPAR) 10 MG tablet Take 10 mg by mouth 2 (two) times daily. On Mondays, Wednesdays, and Fridays 8/28/23   Provider, Historical   cyproheptadine (PERIACTIN) 4 mg tablet Take 1 tablet by mouth 3 (three) times daily as needed. 8/28/23   Provider,  Historical   diclofenac sodium (VOLTAREN) 1 % Gel Apply 2 g topically 3 (three) times daily as needed.    Provider, Historical   doxercalciferol (HECTOROL IV) Inject 2 mcg into the vein every Mon, Wed, Fri. 8/23/23 8/21/24  Provider, Historical   fluticasone-umeclidin-vilanter (TRELEGY ELLIPTA) 200-62.5-25 mcg inhaler Inhale 1 puff into the lungs once daily. 6/29/23   Giancarlo Rust MD   furosemide (LASIX) 80 MG tablet Take 1 tablet (80 mg total) by mouth 2 (two) times daily On non dialysis days Tuesday-Thursday- Saturday -Sunday 12/17/22 12/17/23  Aura Diggs MD   HYDROcodone-acetaminophen (NORCO)  mg per tablet Take 1 tablet by mouth 3 (three) times daily as needed for Pain. 9/26/23 10/29/23  Greta Prado MD   LORazepam (ATIVAN) 0.5 MG tablet Take 1 tablet (0.5 mg total) by mouth every 8 (eight) hours as needed for Anxiety (use 1-2 before dialysis for anxiety, prn bedtime). 8/18/23 11/16/23  Katharine Urbina NP   megestroL (MEGACE) 20 MG Tab Take 1 tablet (20 mg total) by mouth 2 (two) times daily. 8/23/23 8/22/24  Katharine Urbina NP   midodrine (PROAMATINE) 10 MG tablet Take 1 tablet (10 mg total) by mouth in the morning and 1 tablet (10 mg total) in the evening and 1 tablet (10 mg total) before bedtime. 6/23/23      mirtazapine (REMERON) 7.5 MG Tab Take 1 tablet (7.5 mg total) by mouth every evening. 6/6/23 6/5/24  Kingston Verduzco MD   ondansetron (ZOFRAN) 4 mg/5 mL solution Take 5 mLs (4 mg total) by mouth 2 (two) times daily as needed for Nausea. 10/9/23   Jennifer Bowles MD   predniSONE (DELTASONE) 20 MG tablet Take 2 tablets (40 mg total) by mouth once daily. 10/10/23   Jennifer Bowles MD   sevelamer carbonate (RENVELA) 800 mg Tab Take 1 tablet by mouth 3 times a day 7/26/23   Feng Montemayor MD   sodium chloride 7% 7 % nebulizer solution Take 4 mLs by nebulization 2 (two) times a day. 6/29/23   Giancarlo Rust MD       Family History  "  Problem Relation Age of Onset    Arthritis Mother     Stroke Mother     Hypertension Father     Cancer Father     Cataracts Father     Diabetes Maternal Aunt     Hypertension Maternal Grandfather     Heart disease Maternal Grandfather     Heart attack Maternal Grandfather     Cataracts Sister     Glaucoma Cousin        Social History     Tobacco Use    Smoking status: Former     Types: Cigarettes    Smokeless tobacco: Former     Quit date: 2/3/2015   Substance Use Topics    Alcohol use: Not Currently     Comment: Rare    Drug use: No       Review of Systems   Constitutional:  Positive for malaise/fatigue.   Respiratory:  Positive for shortness of breath.           Objective:   Last 24 Hour Vital Signs:  BP  Min: 99/66  Max: 140/82  Temp  Av.8 °F (36.6 °C)  Min: 97.7 °F (36.5 °C)  Max: 97.9 °F (36.6 °C)  Pulse  Av.4  Min: 98  Max: 129  Resp  Av.6  Min: 18  Max: 38  SpO2  Av.7 %  Min: 95 %  Max: 99 %  Height  Av' 2" (157.5 cm)  Min: 5' 2" (157.5 cm)  Max: 5' 2" (157.5 cm)  Weight  Av kg (90 lb 6.2 oz)  Min: 41 kg (90 lb 6.2 oz)  Max: 41 kg (90 lb 6.2 oz)  Body mass index is 16.53 kg/m².  No intake/output data recorded.    Physical Exam  Constitutional:       General: She is in acute distress.      Appearance: She is ill-appearing.   HENT:      Head: Normocephalic and atraumatic.   Cardiovascular:      Rate and Rhythm: Regular rhythm. Tachycardia present.      Heart sounds: No murmur heard.  Pulmonary:      Breath sounds: Examination of the left-lower field reveals decreased breath sounds. Decreased breath sounds and rales present.   Abdominal:      General: Abdomen is flat. Bowel sounds are normal.      Palpations: Abdomen is soft.   Musculoskeletal:      Right lower leg: No edema.      Left lower leg: No edema.   Neurological:      General: No focal deficit present.      Motor: Weakness present.         Laboratory:  Most Recent Data:  CBC:   Lab Results   Component Value Date    WBC " 16.24 (H) 10/12/2023    HGB 11.9 (L) 10/12/2023    HCT 35.8 (L) 10/12/2023     10/12/2023     (H) 10/12/2023    RDW 14.6 (H) 10/12/2023       BMP:   Lab Results   Component Value Date     10/12/2023    K 4.6 10/12/2023     10/12/2023    CO2 23 10/12/2023    BUN 36 (H) 10/12/2023    CREATININE 4.3 (H) 10/12/2023    GLU 98 10/12/2023    CALCIUM 9.1 10/12/2023    MG 2.2 10/12/2023    PHOS 5.2 (H) 10/08/2023     LFTs:   Lab Results   Component Value Date    PROT 7.3 10/08/2023    ALBUMIN 3.7 10/08/2023    BILITOT 0.5 10/08/2023    AST 27 10/08/2023    ALKPHOS 52 (L) 10/08/2023    ALT 50 (H) 10/08/2023     Coags:   Lab Results   Component Value Date    INR 1.1 11/10/2020     FLP:   Lab Results   Component Value Date    CHOL 166 07/17/2019    HDL 47 07/17/2019    LDLCALC 89.8 07/17/2019    TRIG 146 07/17/2019    CHOLHDL 28.3 07/17/2019     DM:   Lab Results   Component Value Date    HGBA1C 5.2 01/22/2018    HGBA1C 4.7 05/14/2012    LDLCALC 89.8 07/17/2019    CREATININE 4.3 (H) 10/12/2023     Thyroid:   Lab Results   Component Value Date    TSH 2.189 02/23/2020     Anemia:   Lab Results   Component Value Date    IRON 13 (L) 02/12/2020    TIBC 462 (H) 02/12/2020    FERRITIN 148 07/17/2019    [id number] 647 07/17/2019    FOLATE 4.0 07/17/2019     Cardiac:   Lab Results   Component Value Date    TROPONINI 0.172 (H) 10/12/2023     (H) 10/12/2023     Urinalysis:   Lab Results   Component Value Date    LABURIN ESCHERICHIA COLI  >100,000 cfu/ml   (A) 11/08/2022    LABURIN KLEBSIELLA PNEUMONIAE  > 100,000 cfu/ml   (A) 11/08/2022    COLORU Yellow 11/04/2022    SPECGRAV 1.025 11/04/2022    NITRITE Negative 11/04/2022    KETONESU Negative 11/04/2022    UROBILINOGEN 2.0-3.0 (A) 11/04/2022    WBCUA 2 11/04/2022       Trended Lab Data:  Recent Labs   Lab 10/08/23  0039 10/08/23  0405 10/12/23  1232 10/12/23  1248   WBC 17.80* 13.20* 16.24*  --    HGB 12.4 11.5* 11.9*  --    HCT 38.5 36.4* 37.1 35.8*     194 199  --    * 103* 102*  --    RDW 14.4 14.6* 14.6*  --     137 139  --    K 5.9* 5.7* 4.6  --     105 100  --    CO2 21* 15* 23  --    BUN 67* 67* 36*  --    CREATININE 4.9* 5.0* 4.3*  --    GLU 94 111* 98  --    PROT 7.3  --   --   --    ALBUMIN 3.7  --   --   --    BILITOT 0.5  --   --   --    AST 27  --   --   --    ALKPHOS 52*  --   --   --    ALT 50*  --   --   --        Trended Cardiac Data:  Recent Labs   Lab 10/08/23  0039 10/08/23  0210 10/12/23  1232   TROPONINI 0.098* 0.099* 0.172*   *  --  846*       Microbiology Data:      Other Results:  EKG (my interpretation): normal sinus rhythm.    Radiology:  Imaging Results              X-Ray Chest AP Portable (Final result)  Result time 10/12/23 13:11:03      Final result by Gavin Ramesh MD (10/12/23 13:11:03)                   Impression:      1. Grossly stable appearing cardiopulmonary findings noting pleural effusions and bilateral basilar subsegmental atelectasis.  Developing left lower lung zone consolidation not excluded.  Interstitial findings could reflect superimposed edema versus chronic change.  Correlation with any history of COPD/emphysema.      Electronically signed by: Gavin Ramesh MD  Date:    10/12/2023  Time:    13:11               Narrative:    EXAMINATION:  XR CHEST AP PORTABLE    CLINICAL HISTORY:  Shortness of breath    TECHNIQUE:  Single frontal view of the chest was performed.    COMPARISON:  10/08/2023    FINDINGS:  The cardiomediastinal silhouette is not enlarged noting calcification of the aorta.  Right central venous catheter tip projects over the distal SVC..  There is obscuration of the costophrenic angles suggesting effusions, left greater than right, similar to the previous exam..  The trachea is midline.  The lungs are symmetrically expanded bilaterally with coarse interstitial attenuation bilaterally.  There is bilateral basilar subsegmental atelectasis or scarring..  There is no  pneumothorax.  The osseous structures are remarkable for degenerative change..                                          Code Status:     DNR- confirmed with patient's daughter    Franchesca Tillman MD  [facility]

## 2023-10-12 NOTE — NURSING
Pt transported to ICU  via ER stretcher on BIPAP and cardiac monitoring. Pt transferred to ICU bed without incident. Pt orientated to the room, personal belongings stored, bed in lowest position, bed alarm on, and call light within reach. Plan of care ongoing.

## 2023-10-12 NOTE — ED NOTES
Pt arrive from home complains of SOB. Pt has a hx of COPD, CKD, HTN and is normally on 3lNC at home. Pt had a recent hospitalized for pneumonia. Pt appears to mildly distressed.

## 2023-10-12 NOTE — ED PROVIDER NOTES
Encounter Date: 10/12/2023       History     Chief Complaint   Patient presents with    Shortness of Breath     Pt presents to the ED for sob. Pt presents on a neb mask at 8lpm .  Pt has been hospitalized recently for pneumonia     78-year-old female past medical history as below brought in by EMS from home with shortness of breath.  Patient's caregiver says that patient has had worsening shortness of breath since yesterday.  EMS says they administered DuoNebs improved with mild improvement in patient's symptoms.  Patient denies chest pain. Says that she completed the steroids that were prescribed to her.    The history is provided by the EMS personnel, the patient, a relative and a caregiver. No  was used.     Review of patient's allergies indicates:   Allergen Reactions    Aspirin      Other reaction(s): hx of ulcers    Tetracycline Swelling     Other reaction(s): Swelling    Penicillins Rash     Other reaction(s): Hives  Other reaction(s): Rash  Other reaction(s): Rash  Other reaction(s): Hives     Past Medical History:   Diagnosis Date    Acute congestive heart failure 02/10/2020    Anemia     Bilateral renal cysts     Cataract     Chronic LBP 7/26/2012    Chronic pain     CKD (chronic kidney disease), stage IV     Colon polyp 2013    COPD (chronic obstructive pulmonary disease)     Dehydration     Encounter for blood transfusion     HTN (hypertension)     Lumbar spondylosis     Melanoma     of the lip    Metabolic bone disease     Migraines, neuralgic     Osteoporosis     Primary osteoarthritis of both knees     s/p Rt TKA    Pulmonary embolism with infarction     Seizures 1972    x1 only    Subdeltoid bursitis, L>R. 9/27/2012    Ulcer     Vitamin D deficiency disease      Past Surgical History:   Procedure Laterality Date    BLADDER SUSPENSION      CATARACT EXTRACTION  11/18/13    left eye    CERVICAL LAMINECTOMY      x3, fusion x1    COLONOSCOPY  2009     DECLOTTING OF ARTERIOVENOUS GRAFT Left 1/3/2022    Procedure: [id number];  Surgeon: Lindsey Louie MD;  Location: [facility] OR;  Service: Vascular;  Laterality: Left;    DECLOTTING OF ARTERIOVENOUS GRAFT Left 2/8/2022    Procedure: [id number];  Surgeon: Lindsey Louie MD;  Location: [facility] OR;  Service: Vascular;  Laterality: Left;    DECLOTTING OF ARTERIOVENOUS GRAFT Left 4/8/2022    Procedure: [id number];  Surgeon: Lindsey Louie MD;  Location: [facility] OR;  Service: Vascular;  Laterality: Left;    FISTULOGRAM N/A 2/27/2020    Procedure: Fistulogram;  Surgeon: Noe Benitez MD;  Location: [facility] CATH LAB/EP;  Service: Cardiology;  Laterality: N/A;    HYSTERECTOMY      JOINT REPLACEMENT  2001    total right knee     LUMBAR LAMINECTOMY      x 3, fusion x1    OOPHORECTOMY      PERIPHERAL ANGIOGRAPHY N/A 3/9/2020    Procedure: Peripheral angiography;  Surgeon: Jacinto Henriquez MD;  Location: [facility] CATH LAB/EP;  Service: Cardiology;  Laterality: N/A;    PLACEMENT OF ARTERIOVENOUS GRAFT Left 1/21/2020    Procedure: INSERTION, GRAFT, ARTERIOVENOUS;  Surgeon: Lindsey Louie MD;  Location: [facility] OR;  Service: General;  Laterality: Left;    PLACEMENT OF ARTERIOVENOUS GRAFT Right 7/22/2022    Procedure: INSERTION, GRAFT, ARTERIOVENOUS;  Surgeon: Lindsey Louie MD;  Location: [facility] OR;  Service: General;  Laterality: Right;    PLACEMENT OF DUAL-LUMEN VASCULAR CATHETER Right 4/16/2022    Procedure: INSERTION-CATHETER-RICKI;  Surgeon: Lindsey Louie MD;  Location: [facility] OR;  Service: General;  Laterality: Right;    THROMBECTOMY Left 2/3/2020    Procedure: THROMBECTOMY;  Surgeon: Lindsey Louie MD;  Location: [facility] OR;  Service: General;  Laterality: Left;    THROMBECTOMY  3/9/2020    Procedure: Thrombectomy;  Surgeon: Jacinto Henriquez MD;  Location: [facility] CATH LAB/EP;  Service: Cardiology;;    THROMBECTOMY Left 4/7/2022    Procedure: THROMBECTOMY;  Surgeon:  Lindsey Louie MD;  Location: [facility] OR;  Service: General;  Laterality: Left;     Family History   Problem Relation Age of Onset    Arthritis Mother     Stroke Mother     Hypertension Father     Cancer Father     Cataracts Father     Diabetes Maternal Aunt     Hypertension Maternal Grandfather     Heart disease Maternal Grandfather     Heart attack Maternal Grandfather     Cataracts Sister     Glaucoma Cousin      Social History     Tobacco Use    Smoking status: Former     Types: Cigarettes    Smokeless tobacco: Former     Quit date: 2/3/2015   Substance Use Topics    Alcohol use: Not Currently     Comment: Rare    Drug use: No     Review of Systems    Physical Exam     Initial Vitals [10/12/23 1146]   BP Pulse Resp Temp SpO2   (!) 140/82 (!) 129 18 97.7 °F (36.5 °C) 99 %      MAP       --         Physical Exam    Nursing note and vitals reviewed.  Constitutional: She appears well-developed. She is not diaphoretic. No distress.   HENT:   Head: Normocephalic and atraumatic.   Eyes: EOM are normal. Pupils are equal, round, and reactive to light.   Neck:   Normal range of motion.  Cardiovascular:  Regular rhythm.           Tachyardic   Pulmonary/Chest: She is in respiratory distress (moderate). She has wheezes.   Abdominal: Abdomen is soft. She exhibits no distension.   Musculoskeletal:         General: No edema. Normal range of motion.      Cervical back: Normal range of motion.     Neurological: She is alert and oriented to person, place, and time.   Skin: Skin is warm and dry.   Psychiatric: She has a normal mood and affect.         ED Course   Critical Care    Date/Time: 10/12/2023 2:48 PM    Performed by: Nitza Moon MD  Authorized by: Nitza Moon MD  Total critical care time (exclusive of procedural time) : 40 minutes  Critical care time was exclusive of separately billable procedures and treating other patients.  Critical care was necessary to treat or prevent imminent or  life-threatening deterioration of the following conditions: respiratory failure.  Critical care was time spent personally by me on the following activities: development of treatment plan with patient or surrogate, interpretation of cardiac output measurements, evaluation of patient's response to treatment, examination of patient, obtaining history from patient or surrogate, ordering and performing treatments and interventions, ordering and review of laboratory studies, ordering and review of radiographic studies, pulse oximetry, re-evaluation of patient's condition and review of old charts.        Labs Reviewed   CBC W/ AUTO DIFFERENTIAL - Abnormal; Notable for the following components:       Result Value    WBC 16.24 (*)     RBC 3.63 (*)     Hemoglobin 11.9 (*)      (*)     MCH 32.8 (*)     RDW 14.6 (*)     Immature Granulocytes 0.9 (*)     Gran # (ANC) 12.6 (*)     Immature Grans (Abs) 0.14 (*)     Mono # 1.4 (*)     Gran % 77.6 (*)     Lymph % 10.0 (*)     All other components within normal limits   BASIC METABOLIC PANEL - Abnormal; Notable for the following components:    BUN 36 (*)     Creatinine 4.3 (*)     eGFR 10 (*)     All other components within normal limits   TROPONIN I - Abnormal; Notable for the following components:    Troponin I 0.172 (*)     All other components within normal limits   B-TYPE NATRIURETIC PEPTIDE - Abnormal; Notable for the following components:     (*)     All other components within normal limits   CULTURE, BLOOD   CULTURE, BLOOD   MAGNESIUM   B-TYPE NATRIURETIC PEPTIDE   LACTIC ACID, PLASMA   PROCALCITONIN   MAGNESIUM   INFLUENZA A AND B ANTIGEN   SARS-COV-2 RDRP GENE   POCT INFLUENZA A/B MOLECULAR     EKG Readings: (Independently Interpreted)   Initial Reading: No STEMI. Previous EKG: Compared with most recent EKG Rhythm: Sinus Tachycardia. Heart Rate: 127. Ectopy: No Ectopy. Axis: Normal. Clinical Impression: Sinus Tachycardia     ECG Results              EKG  12-lead (In process)  Result time 10/12/23 14:09:30      In process by Interface, Lab In [facility] (10/12/23 14:09:30)                   Narrative:    Test Reason : R06.00,    Vent. Rate : 127 BPM     Atrial Rate : 127 BPM     P-R Int : 134 ms          QRS Dur : 068 ms      QT Int : 314 ms       P-R-T Axes : 067 133 052 degrees     QTc Int : 456 ms    Sinus tachycardia  Left posterior fascicular block  Anterior infarct ,age undetermined  Abnormal ECG  When compared with ECG of 08-OCT-2023 00:26,  Left posterior fascicular block is now Present  Anterior infarct is now Present  Nonspecific T wave abnormality no longer evident in Inferior leads    Referred By: System System           Confirmed By:                                   Imaging Results              X-Ray Chest AP Portable (Final result)  Result time 10/12/23 13:11:03      Final result by Gavin Ramesh MD (10/12/23 13:11:03)                   Impression:      1. Grossly stable appearing cardiopulmonary findings noting pleural effusions and bilateral basilar subsegmental atelectasis.  Developing left lower lung zone consolidation not excluded.  Interstitial findings could reflect superimposed edema versus chronic change.  Correlation with any history of COPD/emphysema.      Electronically signed by: Gavin Ramesh MD  Date:    10/12/2023  Time:    13:11               Narrative:    EXAMINATION:  XR CHEST AP PORTABLE    CLINICAL HISTORY:  Shortness of breath    TECHNIQUE:  Single frontal view of the chest was performed.    COMPARISON:  10/08/2023    FINDINGS:  The cardiomediastinal silhouette is not enlarged noting calcification of the aorta.  Right central venous catheter tip projects over the distal SVC..  There is obscuration of the costophrenic angles suggesting effusions, left greater than right, similar to the previous exam..  The trachea is midline.  The lungs are symmetrically expanded bilaterally with coarse interstitial attenuation bilaterally.   There is bilateral basilar subsegmental atelectasis or scarring..  There is no pneumothorax.  The osseous structures are remarkable for degenerative change..                                       Medications   sodium chloride 0.9% flush 10 mL (has no administration in time range)   acetaminophen tablet 650 mg (has no administration in time range)   predniSONE tablet 40 mg (has no administration in time range)   mupirocin 2 % ointment (has no administration in time range)   furosemide tablet 80 mg (has no administration in time range)   LORazepam tablet 0.5 mg (has no administration in time range)   megestroL tablet 20 mg (has no administration in time range)   midodrine tablet 10 mg (has no administration in time range)   mirtazapine tablet 7.5 mg (has no administration in time range)   sevelamer carbonate tablet 800 mg (has no administration in time range)   busPIRone tablet 10 mg (has no administration in time range)   busPIRone tablet 10 mg (has no administration in time range)   0.9%  NaCl infusion (has no administration in time range)   sodium chloride 0.9% bolus 250 mL 250 mL (has no administration in time range)   melatonin tablet 6 mg (has no administration in time range)   heparin (porcine) injection 4,100 Units (has no administration in time range)   sodium chloride 0.9% flush 2 mL (has no administration in time range)   famotidine (PF) injection 20 mg (has no administration in time range)   azithromycin (ZITHROMAX) 500 mg in dextrose 5 % (D5W) 250 mL IVPB (Vial-Mate) (has no administration in time range)   heparin (porcine) injection 5,000 Units (has no administration in time range)   cefTRIAXone (ROCEPHIN) 2 g in dextrose 5 % in water (D5W) 100 mL IVPB (MB+) (has no administration in time range)   albuterol-ipratropium 2.5 mg-0.5 mg/3 mL nebulizer solution 3 mL (has no administration in time range)   albuterol sulfate nebulizer solution 2.5 mg (has no administration in time range)   albuterol sulfate  nebulizer solution 10 mg (10 mg Nebulization Given 10/12/23 1209)   ipratropium 0.02 % nebulizer solution 0.5 mg (0.5 mg Nebulization Given 10/12/23 1209)   methylPREDNISolone sodium succinate injection 80 mg (80 mg Intravenous Given 10/12/23 1238)     Medical Decision Making  78-year-old female past medical history as above brought in by EMS from home with worsening shortness of breath over the past 2 days.  Upon arrival patient is tachycardic and tachypneic, using accessory muscles. Ddx includes but not limited to COPD, pneumothorax, pna, PE, CHF, anemia. Diffuse wheezing throughout.  X-ray without acute pathology. Recent VQ scan without obvious PE from last admission. Patient placed on BiPAP with improvement in sxs. VBG obtained after patient had been on Bipap for about one hour, mild respiratory acidosis. She says she feels better on Bipap and would like to continue it. Wheezing improved after duonebs. Discussed with [facility] and admitted for further management of respiratory distress.     Amount and/or Complexity of Data Reviewed  Independent Historian: caregiver and EMS     Details: As documented above   Labs: ordered. Decision-making details documented in ED Course.  Radiology: ordered and independent interpretation performed.  ECG/medicine tests: ordered and independent interpretation performed.    Risk  Prescription drug management.  Decision regarding hospitalization.               ED Course as of 10/12/23 1449   Thu Oct 12, 2023   1307 Troponin I(!): 0.172  0.099 4 days ago. ECG has been ordered but not yet completed.  [AT]   1307 Potassium: 4.6  Normal, no indication for emergent dialysis  [AT]   1307 Hemoglobin(!): 11.9  Stable anemia [AT]   1308 SARS-CoV-2 RNA, Amplification, Qual: Negative [AT]   1308 SARS-CoV-2 RNA, Amplification, Qual: Negative [AT]   1308 POC Molecular Influenza A Ag: Negative [AT]   1309 10/8/23 VQ scan Impression:     Overall findings represent intermediate to low suspicion for  pulmonary embolism noting limitations above.   [AT]   1316 CXR: Impression:     1. Grossly stable appearing cardiopulmonary findings noting pleural effusions and bilateral basilar subsegmental atelectasis.  Developing left lower lung zone consolidation not excluded.  Interstitial findings could reflect superimposed edema versus chronic change.  Correlation with any history of COPD/emphysema.   [AT]   1345 Discussed with Dr. Hernández [facility], will admit for further management of COPD exacerbation on continued Bipap to the ICU.  [AT]   1449 Sodium: 139  Normal  [AT]   1449 Lactate, Price: 1.3  Normal  [AT]      ED Course User Index  [AT] Nitza Moon MD                    Clinical Impression:   Final diagnoses:  [R06.02] Shortness of breath  [R06.00] Dyspnea        ED Disposition Condition    Observation                 Nitza Moon MD  10/12/23 [id number]

## 2023-10-12 NOTE — Clinical Note
Diagnosis: Shortness of breath [786.05.ICD-9-CM]   Future Attending Provider: MIN PONCE [[id number]]   Admitting Provider:: SB FERNANDEZ [[id number]]

## 2023-10-12 NOTE — CONSULTS
[facility] Consult Note    Primary Attending Physician: Franchesca Tillman  Consultant Attending: Roxana Matta  Consultant Fellow: Anthony Verdugo    Reason for Consult:     COPD exacerbation    Subjective:      History of Present Illness:  Katie Quevedo is a 78 y.o.  female with a PMH of CHF, COPD, HTN, ESRD on HD MWF who presented to the [facility] ED on 10/12/2023 with a primary complaint of Shortness of Breath for the past one day.     Patient has had numerous hospitalizations for dyspnea in the past 4 years. She previously had thoracenteses in 6/2019 and 7/2019, which appeared to be transudative based on Light's criteria. Cytology ruled out malignancy at the time as it was suspected initially. It was previously reported by pulmonology that patient tends to hyperventilate at night / during dialysis. She is a former smoker who quit about 9 years ago.     Patient reports using Trelegy at home. Her dyspnea is unprovoked and she feels SOB at rest. Denies fever, chill, cough, chest pain. Denies recent illness other than the pneumonia for which she was recently hospitalized and has not yet completed a full course of abx for. Today, she was admitted to the ICU for management of her COPD exacerbation.     Past Medical History:  Past Medical History:   Diagnosis Date    Acute congestive heart failure 02/10/2020    Anemia     Bilateral renal cysts     Cataract     Chronic LBP 7/26/2012    Chronic pain     CKD (chronic kidney disease), stage IV     Colon polyp 2013    COPD (chronic obstructive pulmonary disease)     Dehydration     Encounter for blood transfusion     HTN (hypertension)     Lumbar spondylosis     Melanoma     of the lip    Metabolic bone disease     Migraines, neuralgic     Osteoporosis     Primary osteoarthritis of both knees     s/p Rt TKA    Pulmonary embolism with infarction     Seizures 1972    x1 only    Subdeltoid bursitis, L>R. 9/27/2012    Ulcer     Vitamin D  deficiency disease        Past Surgical History:  Past Surgical History:   Procedure Laterality Date    BLADDER SUSPENSION      CATARACT EXTRACTION  11/18/13    left eye    CERVICAL LAMINECTOMY      x3, fusion x1    COLONOSCOPY  2009    DECLOTTING OF ARTERIOVENOUS GRAFT Left 1/3/2022    Procedure: [id number];  Surgeon: Lindsey Louie MD;  Location: [facility] OR;  Service: Vascular;  Laterality: Left;    DECLOTTING OF ARTERIOVENOUS GRAFT Left 2/8/2022    Procedure: [id number];  Surgeon: Lindsey Louie MD;  Location: [facility] OR;  Service: Vascular;  Laterality: Left;    DECLOTTING OF ARTERIOVENOUS GRAFT Left 4/8/2022    Procedure: [id number];  Surgeon: Lindsey Louie MD;  Location: [facility] OR;  Service: Vascular;  Laterality: Left;    FISTULOGRAM N/A 2/27/2020    Procedure: Fistulogram;  Surgeon: Noe Benitez MD;  Location: [facility] CATH LAB/EP;  Service: Cardiology;  Laterality: N/A;    HYSTERECTOMY      JOINT REPLACEMENT  2001    total right knee     LUMBAR LAMINECTOMY      x 3, fusion x1    OOPHORECTOMY      PERIPHERAL ANGIOGRAPHY N/A 3/9/2020    Procedure: Peripheral angiography;  Surgeon: Jacinto Henriquez MD;  Location: [facility] CATH LAB/EP;  Service: Cardiology;  Laterality: N/A;    PLACEMENT OF ARTERIOVENOUS GRAFT Left 1/21/2020    Procedure: INSERTION, GRAFT, ARTERIOVENOUS;  Surgeon: Lindsey Louie MD;  Location: [facility] OR;  Service: General;  Laterality: Left;    PLACEMENT OF ARTERIOVENOUS GRAFT Right 7/22/2022    Procedure: INSERTION, GRAFT, ARTERIOVENOUS;  Surgeon: Lindsey Louie MD;  Location: [facility] OR;  Service: General;  Laterality: Right;    PLACEMENT OF DUAL-LUMEN VASCULAR CATHETER Right 4/16/2022    Procedure: INSERTION-CATHETER-RICKI;  Surgeon: Lindsey Louie MD;  Location: [facility] OR;  Service: General;  Laterality: Right;    THROMBECTOMY Left 2/3/2020    Procedure: THROMBECTOMY;  Surgeon: Lindsey Louie MD;  Location: [facility] OR;  Service: General;   Laterality: Left;    THROMBECTOMY  3/9/2020    Procedure: Thrombectomy;  Surgeon: Jacinto Henriquez MD;  Location: [facility] CATH LAB/EP;  Service: Cardiology;;    THROMBECTOMY Left 4/7/2022    Procedure: THROMBECTOMY;  Surgeon: Lindsey Louie MD;  Location: [facility] OR;  Service: General;  Laterality: Left;       Allergies:  Review of patient's allergies indicates:   Allergen Reactions    Aspirin      Other reaction(s): hx of ulcers    Tetracycline Swelling     Other reaction(s): Swelling    Penicillins Rash     Other reaction(s): Hives  Other reaction(s): Rash  Other reaction(s): Rash  Other reaction(s): Hives       Medications:   In-Hospital Scheduled Medications:   albuterol-ipratropium  3 mL Nebulization Q4H    azithromycin  500 mg Intravenous Q24H    [START ON 10/13/2023] busPIRone  10 mg Oral Once per day on Mon Wed Fri    busPIRone  10 mg Oral Once per day on Mon Wed Fri    cefTRIAXone (ROCEPHIN) IVPB  2 g Intravenous Q24H    [START ON 10/13/2023] fluticasone furoate-vilanteroL  1 puff Inhalation Daily    furosemide  80 mg Oral BID    heparin (porcine)  5,000 Units Subcutaneous Q12H    megestroL  20 mg Oral BID    midodrine  10 mg Oral TID WM    mirtazapine  7.5 mg Oral QHS    mupirocin   Nasal BID    predniSONE  40 mg Oral Daily    sevelamer carbonate  800 mg Oral TID WM    vancomycin (VANCOCIN) IV (PEDS and ADULTS)  1,000 mg Intravenous Once      In-Hospital PRN Medications:  sodium chloride 0.9%, sodium chloride 0.9%, acetaminophen, albuterol sulfate, albuterol sulfate, heparin (porcine), HYDROcodone-acetaminophen, melatonin, sodium chloride 0.9%, sodium chloride 0.9%, sodium chloride 0.9%, sodium chloride 3%, Pharmacy to dose Vancomycin consult **AND** vancomycin - pharmacy to dose   In-Hospital IV Infusion Medications:     Home Medications:  Prior to Admission medications    Medication Sig Start Date End Date Taking? Authorizing Provider   albuterol (PROVENTIL/VENTOLIN HFA) 90 mcg/actuation inhaler Inhale  2 puffs into the lungs every 6 (six) hours as needed for Wheezing. Rescue 6/29/23   Giancarlo Rust MD   albuterol-ipratropium (DUO-NEB) 2.5 mg-0.5 mg/3 mL nebulizer solution Take 3 mLs by nebulization every 6 (six) hours as needed for Shortness of Breath. Rescue 6/29/23 6/28/24  Giancarlo Rust MD   azelastine (ASTELIN) 137 mcg (0.1 %) nasal spray 1 spray (137 mcg total) by Nasal route 2 (two) times daily as needed for Rhinitis. 10/1/23 9/30/24  Moses Hernández MD   B complex-vitamin C-folic acid (RENAL-RADHA) 0.8 mg Tab Take 0.8 mg by mouth every Mon, Wed, Fri. 8/28/23   Provider, Historical   busPIRone (BUSPAR) 10 MG tablet Take 10 mg by mouth 2 (two) times daily. On Mondays, Wednesdays, and Fridays 8/28/23   Provider, Historical   cyproheptadine (PERIACTIN) 4 mg tablet Take 1 tablet by mouth 3 (three) times daily as needed. 8/28/23   Provider, Historical   diclofenac sodium (VOLTAREN) 1 % Gel Apply 2 g topically 3 (three) times daily as needed.    Provider, Historical   doxercalciferol (HECTOROL IV) Inject 2 mcg into the vein every Mon, Wed, Fri. 8/23/23 8/21/24  Provider, Historical   fluticasone-umeclidin-vilanter (TRELEGY ELLIPTA) 200-62.5-25 mcg inhaler Inhale 1 puff into the lungs once daily. 6/29/23   Giancarlo Rust MD   furosemide (LASIX) 80 MG tablet Take 1 tablet (80 mg total) by mouth 2 (two) times daily On non dialysis days Tuesday-Thursday- Saturday -Sunday 12/17/22 12/17/23  Aura Diggs MD   HYDROcodone-acetaminophen (NORCO)  mg per tablet Take 1 tablet by mouth 3 (three) times daily as needed for Pain. 9/26/23 10/29/23  Greta Prado MD   LORazepam (ATIVAN) 0.5 MG tablet Take 1 tablet (0.5 mg total) by mouth every 8 (eight) hours as needed for Anxiety (use 1-2 before dialysis for anxiety, prn bedtime). 8/18/23 11/16/23  Katharine Urbina NP   megestroL (MEGACE) 20 MG Tab Take 1 tablet (20 mg total) by mouth 2 (two) times daily. 8/23/23 8/22/24  Ciara  "Katharine ORNELAS NP   midodrine (PROAMATINE) 10 MG tablet Take 1 tablet (10 mg total) by mouth in the morning and 1 tablet (10 mg total) in the evening and 1 tablet (10 mg total) before bedtime. 23      mirtazapine (REMERON) 7.5 MG Tab Take 1 tablet (7.5 mg total) by mouth every evening. 23  Kingston Verduzco MD   ondansetron (ZOFRAN) 4 mg/5 mL solution Take 5 mLs (4 mg total) by mouth 2 (two) times daily as needed for Nausea. 10/9/23   Jennifer Bowles MD   predniSONE (DELTASONE) 20 MG tablet Take 2 tablets (40 mg total) by mouth once daily. 10/10/23   Jennifer Bowles MD   sevelamer carbonate (RENVELA) 800 mg Tab Take 1 tablet by mouth 3 times a day 23   Feng Montemayor MD   sodium chloride 7% 7 % nebulizer solution Take 4 mLs by nebulization 2 (two) times a day. 23   Giancarlo Rust MD       Family History:  Family History   Problem Relation Age of Onset    Arthritis Mother     Stroke Mother     Hypertension Father     Cancer Father     Cataracts Father     Diabetes Maternal Aunt     Hypertension Maternal Grandfather     Heart disease Maternal Grandfather     Heart attack Maternal Grandfather     Cataracts Sister     Glaucoma Cousin        Social History:  Social History     Tobacco Use    Smoking status: Former     Types: Cigarettes    Smokeless tobacco: Former     Quit date: 2/3/2015   Substance Use Topics    Alcohol use: Not Currently     Comment: Rare    Drug use: No       Review of Systems:  Pertinent items are noted in HPI. All other systems are reviewed and are negative.     Objective:   Last 24 Hour Vital Signs:  BP  Min: 73/47  Max: 140/82  Temp  Av.8 °F (36.6 °C)  Min: 97.7 °F (36.5 °C)  Max: 97.9 °F (36.6 °C)  Pulse  Av.3  Min: 98  Max: 129  Resp  Av.7  Min: 16  Max: 38  SpO2  Av.8 %  Min: 94 %  Max: 99 %  Height  Av' 2" (157.5 cm)  Min: 5' 2" (157.5 cm)  Max: 5' 2" (157.5 cm)  Weight  Av kg (90 lb 6.2 oz)  Min: 41 kg " "(90 lb 6.2 oz)  Max: 41 kg (90 lb 6.2 oz)  No intake/output data recorded.    Physical Examination:    BP (!) 86/39   Pulse (!) 113   Temp 97.9 °F (36.6 °C)   Resp (!) 29   Ht 5' 2" (1.575 m)   Wt 41 kg (90 lb 6.2 oz)   LMP  (LMP Unknown)   SpO2 98%   BMI 16.53 kg/m²     General Appearance:    Alert, cooperative, moderate respiratory distress, uses accessory respiratory muscles, appears stated age   Head:    Normocephalic, without obvious abnormality, atraumatic   Eyes:    PERRL, conjunctiva/corneas clear, EOM's intact, fundi     benign, both eyes   Neck:   Supple, symmetrical, trachea midline, no adenopathy   Back:     Symmetric, no curvature, ROM normal, no CVA tenderness   Lungs:     Accessory muscle use noted, moderate respiratory distress, speaks in short sentences, decreased air movement bilaterally    Chest Wall:    No tenderness or deformity. Trialysis catheter in place.    Heart:    Regular rate and rhythm, S1 and S2 normal, no murmur, rub   or gallop   Abdomen:     Soft, non-tender, bowel sounds active all four quadrants,     no masses, no organomegaly   Extremities:   Extremities normal, atraumatic, no cyanosis or edema   Pulses:   2+ and symmetric all extremities   Skin:   Skin color, texture, turgor normal, no rashes or lesions   Lymph nodes:   Cervical, supraclavicular, and axillary nodes normal   Neurologic:   CNII-XII intact, normal strength, sensation and reflexes     throughout         Laboratory:  Trended Lab Data:  Recent Labs     10/12/23  1232 10/12/23  1248 10/12/23  1552   WBC 16.24*  --   --    HGB 11.9*  --   --    HCT 37.1 35.8*  --      --   --      --   --    K 4.6  --   --      --   --    CO2 23  --   --    BUN 36*  --   --    CREATININE 4.3*  --   --    GLU 98  --   --    CALCIUM 9.1  --   --    MG 2.2  --  2.1       Cardiac:   Recent Labs   Lab 10/08/23  0039 10/08/23  0210 10/12/23  1232   TROPONINI 0.098* 0.099* 0.172*   *  --  846* "       Urinalysis:   Lab Results   Component Value Date    LABURIN ESCHERICHIA COLI  >100,000 cfu/ml   (A) 11/08/2022    LABURIN KLEBSIELLA PNEUMONIAE  > 100,000 cfu/ml   (A) 11/08/2022    COLORU Yellow 11/04/2022    SPECGRAV 1.025 11/04/2022    NITRITE Negative 11/04/2022    KETONESU Negative 11/04/2022    UROBILINOGEN 2.0-3.0 (A) 11/04/2022       Microbiology:  Microbiology Results (last 7 days)       Procedure Component Value Units Date/Time    Respiratory Infection Panel (PCR), Nasopharyngeal [[id number]] Collected: 10/12/23 1804    Order Status: Completed Specimen: Nasopharyngeal Swab Updated: 10/12/23 1809     Respiratory Infection Panel Source NP Swab    Narrative:      For all other respiratory sources, order [id number] -  Respiratory Viral Panel by PCR    Culture, Respiratory with Gram Stain [[id number]]     Order Status: No result Specimen: Respiratory     Blood culture (site 2) [[id number]] Collected: 10/12/23 1552    Order Status: Sent Specimen: Blood Updated: 10/12/23 1552    Blood culture (site 1) [[id number]] Collected: 10/12/23 1552    Order Status: Sent Specimen: Blood from Antecubital, Right Hand Updated: 10/12/23 1552            Radiology:  CXR showed left sided pleural effusion unchanged from prior. Bilateral basilar subsegmental atelectasis.     CT PE study with contrast negative for a pulmonary embolus. Pleural effusion noted on L side.    I have personally reviewed the above labs and imaging.    Current Medications:     Infusions:       Scheduled:   albuterol-ipratropium  3 mL Nebulization Q4H    azithromycin  500 mg Intravenous Q24H    [START ON 10/13/2023] busPIRone  10 mg Oral Once per day on Mon Wed Fri    busPIRone  10 mg Oral Once per day on Mon Wed Fri    cefTRIAXone (ROCEPHIN) IVPB  2 g Intravenous Q24H    [START ON 10/13/2023] fluticasone furoate-vilanteroL  1 puff Inhalation Daily    furosemide  80 mg Oral BID    heparin (porcine)  5,000 Units Subcutaneous Q12H    megestroL  20 mg Oral  BID    midodrine  10 mg Oral TID WM    mirtazapine  7.5 mg Oral QHS    mupirocin   Nasal BID    predniSONE  40 mg Oral Daily    sevelamer carbonate  800 mg Oral TID WM    vancomycin (VANCOCIN) IV (PEDS and ADULTS)  1,000 mg Intravenous Once        PRN:  sodium chloride 0.9%, sodium chloride 0.9%, acetaminophen, albuterol sulfate, albuterol sulfate, heparin (porcine), HYDROcodone-acetaminophen, melatonin, sodium chloride 0.9%, sodium chloride 0.9%, sodium chloride 0.9%, sodium chloride 3%, Pharmacy to dose Vancomycin consult **AND** vancomycin - pharmacy to dose     Assessment:     Katie Quevedo is a 78 y.o. female with:  Patient Active Problem List    Diagnosis Date Noted    Hyperkalemia 09/30/2023    Community acquired pneumonia of left lower lobe of lung 09/29/2023    Goals of care, counseling/discussion 09/29/2023    Sepsis 09/29/2023    Benzodiazepine dependence, continuous 08/18/2023    Chronic obstructive pulmonary disease with acute exacerbation     Coagulation defect, unspecified 04/25/2023    Unspecified mood (affective) disorder 04/25/2023    Aortic atherosclerosis 12/15/2022    Physical deconditioning 12/10/2022    Hypokalemia 12/09/2022    Adjustment reaction with anxiety and depression 11/01/2022    Advance care planning 10/18/2022    severe stage 4 COPD 10/13/2022    Hypotension 10/13/2022    Influenza A virus present 10/11/2022    Clotted dialysis access 01/03/2022    Secondary hyperparathyroidism 03/30/2021    Orthopnea 11/10/2020    SOB (shortness of breath) 11/09/2020    Acute on chronic heart failure 11/09/2020    Pleural effusion 11/09/2020    Medication management 05/28/2020    Dialysis AV fistula malfunction, sequela 03/09/2020    Diarrhea 02/28/2020    Palliative care encounter 02/26/2020    ESRD (end stage renal disease) on dialysis 02/19/2020    Diastolic dysfunction, left ventricle 02/04/2020    Clotted renal dialysis arteriovenous graft 02/03/2020    Nausea 01/14/2020    Pulmonary cachexia  due to COPD 12/18/2019    Chronic diastolic heart failure 10/30/2019    Lipoma of torso 10/10/2019    Chronic respiratory failure with hypoxia, on home O2 therapy 07/18/2019    Shortness of breath 07/17/2019    Pericardial effusion 07/05/2019    Pleural effusion, left     Acute on chronic respiratory failure with hypoxia 03/05/2018    History of colon polyps 02/06/2018    Atrophy of left kidney 01/25/2018    Kidney cysts 01/25/2018    Iron deficiency anemia 01/22/2018    Essential hypertension 01/22/2018    Osteoporosis 09/21/2015    Osteoarthritis, hip, bilateral 10/27/2014    Lumbar radiculopathy, BLE 10/27/2014    Cervical radiculopathy, BUE 10/27/2014    Biceps tendonitis 01/06/2014    Rotator cuff tear, right 10/18/2013    Nuclear sclerosis - Both Eyes 08/08/2013    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 Trop 0.172, EKG negative for ST changes. Denies chest pain.  - EF 75% with LV concentric remodeling and hyperdynamic systolic function in 10/2022  - on lasix 80 BID  - may need a cardiology evaluation prior to discharge, although HF does not appear to be the primary  of dyspnea now given absence of signs of volume overload    Pulm  COPD Exacerbation  Rule out Pulmonary Embolism  - Patient was hospitalized twice recently for pneumonia - on chart review, she has had a chronic L-sided pleural effusion on CXR  - Presents with acute dyspnea, in moderate respiratory distress, uses accessory muscles, but  no cough or increased sputum. Is able to speak in short sentences. Denies fevers, chills.   - CXRs compared up until 05/2023 show very minimal variation  - CT PE study given acute respiratory distress - ruled out a PE; on bedside POCUS, L-sided pleural effusion was noted and it did not appear loculated  - Start prednisone 40mg daily, duonebs [id number], fluticasone-vilanterol 200-25/dose 1 puff daily   - Start azithromycin and ceftriaxone for CAP coverage    ID  Pneumonia   - Respiratory infection panel, blood cultures ordered  - Pending respiratory culture  - COVID and Influenza A/B negative  - Procal 1.44  - Continue CAP coverage pending infectious workup     FEN/GI  F: No fluids at this time   E: K>4, Mg>2  N: Renal diet     Renal   ESRD  - will need HD MWF inpatient  - renal diet     Thank you for allowing us to participate in the care of this patient. Please contact me if you have any questions regarding this consult.    De Sanderson DO  [facility], PGY-1

## 2023-10-13 LAB
ADENOVIRUS: NOT DETECTED
ALBUMIN SERPL BCP-MCNC: 2.7 G/DL (ref 3.5–5.2)
ANION GAP SERPL CALC-SCNC: 14 MMOL/L (ref 8–16)
ASCENDING AORTA: 2.4 CM
AV INDEX (PROSTH): 0.9
AV MEAN GRADIENT: 8 MMHG
AV PEAK GRADIENT: 13 MMHG
AV VALVE AREA BY VELOCITY RATIO: 2.61 CM²
AV VALVE AREA: 2.67 CM²
AV VELOCITY RATIO: 0.88
BASOPHILS # BLD AUTO: 0.02 K/UL (ref 0–0.2)
BASOPHILS NFR BLD: 0.3 % (ref 0–1.9)
BORDETELLA PARAPERTUSSIS (IS1001): NOT DETECTED
BORDETELLA PERTUSSIS (PTXP): NOT DETECTED
BSA FOR ECHO PROCEDURE: 1.34 M2
BUN SERPL-MCNC: 55 MG/DL (ref 8–23)
CALCIUM SERPL-MCNC: 8 MG/DL (ref 8.7–10.5)
CHLAMYDIA PNEUMONIAE: NOT DETECTED
CHLORIDE SERPL-SCNC: 96 MMOL/L (ref 95–110)
CO2 SERPL-SCNC: 17 MMOL/L (ref 23–29)
CORONAVIRUS 229E, COMMON COLD VIRUS: NOT DETECTED
CORONAVIRUS HKU1, COMMON COLD VIRUS: NOT DETECTED
CORONAVIRUS NL63, COMMON COLD VIRUS: NOT DETECTED
CORONAVIRUS OC43, COMMON COLD VIRUS: NOT DETECTED
CORTIS SERPL-MCNC: 5.6 UG/DL (ref 4.3–22.4)
CREAT SERPL-MCNC: 5.3 MG/DL (ref 0.5–1.4)
CV ECHO LV RWT: 0.47 CM
DIFFERENTIAL METHOD: ABNORMAL
DOP CALC AO PEAK VEL: 1.79 M/S
DOP CALC AO VTI: 33.7 CM
DOP CALC LVOT AREA: 3 CM2
DOP CALC LVOT DIAMETER: 1.94 CM
DOP CALC LVOT PEAK VEL: 1.58 M/S
DOP CALC LVOT STROKE VOLUME: 89.81 CM3
DOP CALC MV VTI: 28.9 CM
DOP CALCLVOT PEAK VEL VTI: 30.4 CM
E WAVE DECELERATION TIME: 185.71 MSEC
E/A RATIO: 1.28
E/E' RATIO: 14.35 M/S
ECHO LV POSTERIOR WALL: 0.67 CM (ref 0.6–1.1)
EJECTION FRACTION: 65 %
EOSINOPHIL # BLD AUTO: 0 K/UL (ref 0–0.5)
EOSINOPHIL NFR BLD: 0 % (ref 0–8)
ERYTHROCYTE [DISTWIDTH] IN BLOOD BY AUTOMATED COUNT: 14.6 % (ref 11.5–14.5)
EST. GFR  (NO RACE VARIABLE): 8 ML/MIN/1.73 M^2
FLUBV RNA NPH QL NAA+NON-PROBE: NOT DETECTED
FRACTIONAL SHORTENING: 28 % (ref 28–44)
GLUCOSE SERPL-MCNC: 132 MG/DL (ref 70–110)
HCT VFR BLD AUTO: 30.6 % (ref 37–48.5)
HGB BLD-MCNC: 9.7 G/DL (ref 12–16)
HPIV1 RNA NPH QL NAA+NON-PROBE: NOT DETECTED
HPIV2 RNA NPH QL NAA+NON-PROBE: NOT DETECTED
HPIV3 RNA NPH QL NAA+NON-PROBE: NOT DETECTED
HPIV4 RNA NPH QL NAA+NON-PROBE: NOT DETECTED
HUMAN METAPNEUMOVIRUS: NOT DETECTED
IMM GRANULOCYTES # BLD AUTO: 0.06 K/UL (ref 0–0.04)
IMM GRANULOCYTES NFR BLD AUTO: 0.8 % (ref 0–0.5)
INFLUENZA A (SUBTYPES H1,H1-2009,H3): NOT DETECTED
INTERVENTRICULAR SEPTUM: 0.7 CM (ref 0.6–1.1)
IVC DIAMETER: 2.38 CM
LA MAJOR: 4.03 CM
LA MINOR: 4.38 CM
LA WIDTH: 3.4 CM
LEFT ATRIUM SIZE: 3.09 CM
LEFT ATRIUM VOLUME INDEX MOD: 19.9 ML/M2
LEFT ATRIUM VOLUME INDEX: 27.6 ML/M2
LEFT ATRIUM VOLUME MOD: 27.09 CM3
LEFT ATRIUM VOLUME: 37.49 CM3
LEFT INTERNAL DIMENSION IN SYSTOLE: 2.07 CM (ref 2.1–4)
LEFT VENTRICLE DIASTOLIC VOLUME INDEX: 23.38 ML/M2
LEFT VENTRICLE DIASTOLIC VOLUME: 31.8 ML
LEFT VENTRICLE MASS INDEX: 33 G/M2
LEFT VENTRICLE SYSTOLIC VOLUME INDEX: 10.2 ML/M2
LEFT VENTRICLE SYSTOLIC VOLUME: 13.93 ML
LEFT VENTRICULAR INTERNAL DIMENSION IN DIASTOLE: 2.88 CM (ref 3.5–6)
LEFT VENTRICULAR MASS: 44.59 G
LV LATERAL E/E' RATIO: 24.4 M/S
LV SEPTAL E/E' RATIO: 10.17 M/S
LVOT MG: 6.09 MMHG
LVOT MV: 1.1 CM/S
LYMPHOCYTES # BLD AUTO: 0.4 K/UL (ref 1–4.8)
LYMPHOCYTES NFR BLD: 5.4 % (ref 18–48)
MCH RBC QN AUTO: 32.4 PG (ref 27–31)
MCHC RBC AUTO-ENTMCNC: 31.7 G/DL (ref 32–36)
MCV RBC AUTO: 102 FL (ref 82–98)
MONOCYTES # BLD AUTO: 0.2 K/UL (ref 0.3–1)
MONOCYTES NFR BLD: 2.7 % (ref 4–15)
MV MEAN GRADIENT: 5 MMHG
MV PEAK A VEL: 0.95 M/S
MV PEAK E VEL: 1.22 M/S
MV PEAK GRADIENT: 8 MMHG
MV VALVE AREA BY CONTINUITY EQUATION: 3.11 CM2
MYCOPLASMA PNEUMONIAE: NOT DETECTED
NEUTROPHILS # BLD AUTO: 7.1 K/UL (ref 1.8–7.7)
NEUTROPHILS NFR BLD: 90.8 % (ref 38–73)
NRBC BLD-RTO: 0 /100 WBC
OHS LV EJECTION FRACTION SIMPSONS BIPLANE MOD: 69 %
PHOSPHATE SERPL-MCNC: 4.2 MG/DL (ref 2.7–4.5)
PISA TR MAX VEL: 2.52 M/S
PLATELET # BLD AUTO: 159 K/UL (ref 150–450)
PMV BLD AUTO: 10.1 FL (ref 9.2–12.9)
POTASSIUM SERPL-SCNC: 5.4 MMOL/L (ref 3.5–5.1)
PULM VEIN S/D RATIO: 1.35
PV PEAK D VEL: 0.4 M/S
PV PEAK S VEL: 0.54 M/S
RA MAJOR: 3.84 CM
RA PRESSURE ESTIMATED: 15 MMHG
RA WIDTH: 3.02 CM
RBC # BLD AUTO: 2.99 M/UL (ref 4–5.4)
RESPIRATORY INFECTION PANEL SOURCE: NORMAL
RIGHT VENTRICULAR END-DIASTOLIC DIMENSION: 1.5 CM
RSV RNA NPH QL NAA+NON-PROBE: NOT DETECTED
RV TB RVSP: 18 MMHG
RV TISSUE DOPPLER FREE WALL SYSTOLIC VELOCITY 1 (APICAL 4 CHAMBER VIEW): 10.94 CM/S
RV+EV RNA NPH QL NAA+NON-PROBE: NOT DETECTED
SARS-COV-2 RNA RESP QL NAA+PROBE: NOT DETECTED
SINUS: 2.59 CM
SODIUM SERPL-SCNC: 127 MMOL/L (ref 136–145)
STJ: 2.5 CM
TDI LATERAL: 0.05 M/S
TDI SEPTAL: 0.12 M/S
TDI: 0.09 M/S
TR MAX PG: 25 MMHG
TV REST PULMONARY ARTERY PRESSURE: 40 MMHG
VANCOMYCIN SERPL-MCNC: 20.5 UG/ML
WBC # BLD AUTO: 7.8 K/UL (ref 3.9–12.7)
Z-SCORE OF LEFT VENTRICULAR DIMENSION IN END DIASTOLE: -4.05
Z-SCORE OF LEFT VENTRICULAR DIMENSION IN END SYSTOLE: -2.04

## 2023-10-13 PROCEDURE — 25000242 PHARM REV CODE 250 ALT 637 W/ HCPCS: Mod: HCNC | Performed by: STUDENT IN AN ORGANIZED HEALTH CARE EDUCATION/TRAINING PROGRAM

## 2023-10-13 PROCEDURE — 97530 THERAPEUTIC ACTIVITIES: CPT | Mod: HCNC

## 2023-10-13 PROCEDURE — 25000242 PHARM REV CODE 250 ALT 637 W/ HCPCS: Mod: HCNC | Performed by: INTERNAL MEDICINE

## 2023-10-13 PROCEDURE — 80100016 HC MAINTENANCE HEMODIALYSIS: Mod: HCNC

## 2023-10-13 PROCEDURE — 92610 EVALUATE SWALLOWING FUNCTION: CPT | Mod: HCNC

## 2023-10-13 PROCEDURE — 97535 SELF CARE MNGMENT TRAINING: CPT | Mod: HCNC

## 2023-10-13 PROCEDURE — 25000003 PHARM REV CODE 250: Mod: HCNC | Performed by: INTERNAL MEDICINE

## 2023-10-13 PROCEDURE — 97161 PT EVAL LOW COMPLEX 20 MIN: CPT | Mod: HCNC

## 2023-10-13 PROCEDURE — 94761 N-INVAS EAR/PLS OXIMETRY MLT: CPT | Mod: HCNC

## 2023-10-13 PROCEDURE — 99900035 HC TECH TIME PER 15 MIN (STAT): Mod: HCNC

## 2023-10-13 PROCEDURE — 94640 AIRWAY INHALATION TREATMENT: CPT | Mod: HCNC

## 2023-10-13 PROCEDURE — 63600175 PHARM REV CODE 636 W HCPCS: Mod: HCNC

## 2023-10-13 PROCEDURE — 97165 OT EVAL LOW COMPLEX 30 MIN: CPT | Mod: HCNC

## 2023-10-13 PROCEDURE — 94660 CPAP INITIATION&MGMT: CPT | Mod: HCNC

## 2023-10-13 PROCEDURE — 80069 RENAL FUNCTION PANEL: CPT | Mod: HCNC | Performed by: INTERNAL MEDICINE

## 2023-10-13 PROCEDURE — 85025 COMPLETE CBC W/AUTO DIFF WBC: CPT | Mod: HCNC | Performed by: INTERNAL MEDICINE

## 2023-10-13 PROCEDURE — 36415 COLL VENOUS BLD VENIPUNCTURE: CPT | Mod: HCNC | Performed by: INTERNAL MEDICINE

## 2023-10-13 PROCEDURE — 82533 TOTAL CORTISOL: CPT | Mod: HCNC | Performed by: INTERNAL MEDICINE

## 2023-10-13 PROCEDURE — 27100171 HC OXYGEN HIGH FLOW UP TO 24 HOURS: Mod: HCNC

## 2023-10-13 PROCEDURE — 80202 ASSAY OF VANCOMYCIN: CPT | Mod: HCNC | Performed by: INTERNAL MEDICINE

## 2023-10-13 PROCEDURE — 63600175 PHARM REV CODE 636 W HCPCS: Mod: HCNC | Performed by: INTERNAL MEDICINE

## 2023-10-13 PROCEDURE — 21400001 HC TELEMETRY ROOM: Mod: HCNC

## 2023-10-13 RX ORDER — LORAZEPAM 0.5 MG/1
0.5 TABLET ORAL EVERY 8 HOURS PRN
Status: DISCONTINUED | OUTPATIENT
Start: 2023-10-13 | End: 2023-10-19

## 2023-10-13 RX ORDER — SODIUM CHLORIDE 9 MG/ML
INJECTION, SOLUTION INTRAVENOUS ONCE
Status: DISCONTINUED | OUTPATIENT
Start: 2023-10-13 | End: 2023-10-16

## 2023-10-13 RX ORDER — CALCIUM GLUCONATE 20 MG/ML
1 INJECTION, SOLUTION INTRAVENOUS ONCE
Status: COMPLETED | OUTPATIENT
Start: 2023-10-13 | End: 2023-10-13

## 2023-10-13 RX ORDER — MIDODRINE HYDROCHLORIDE 5 MG/1
15 TABLET ORAL
Status: DISCONTINUED | OUTPATIENT
Start: 2023-10-13 | End: 2023-10-27 | Stop reason: HOSPADM

## 2023-10-13 RX ORDER — SODIUM CHLORIDE 9 MG/ML
INJECTION, SOLUTION INTRAVENOUS
Status: DISCONTINUED | OUTPATIENT
Start: 2023-10-13 | End: 2023-10-16

## 2023-10-13 RX ORDER — FUROSEMIDE 40 MG/1
80 TABLET ORAL
Status: DISCONTINUED | OUTPATIENT
Start: 2023-10-14 | End: 2023-10-21

## 2023-10-13 RX ADMIN — ALBUTEROL SULFATE 2.5 MG: 2.5 SOLUTION RESPIRATORY (INHALATION) at 11:10

## 2023-10-13 RX ADMIN — LORAZEPAM 0.5 MG: 0.5 TABLET ORAL at 03:10

## 2023-10-13 RX ADMIN — MUPIROCIN: 20 OINTMENT TOPICAL at 08:10

## 2023-10-13 RX ADMIN — MIRTAZAPINE 7.5 MG: 7.5 TABLET ORAL at 09:10

## 2023-10-13 RX ADMIN — BUSPIRONE HYDROCHLORIDE 10 MG: 5 TABLET ORAL at 09:10

## 2023-10-13 RX ADMIN — MIDODRINE HYDROCHLORIDE 15 MG: 5 TABLET ORAL at 09:10

## 2023-10-13 RX ADMIN — HEPARIN SODIUM 5000 UNITS: 5000 INJECTION INTRAVENOUS; SUBCUTANEOUS at 09:10

## 2023-10-13 RX ADMIN — MEGESTROL ACETATE 20 MG: 20 TABLET ORAL at 10:10

## 2023-10-13 RX ADMIN — ACETAMINOPHEN 325MG 650 MG: 325 TABLET ORAL at 06:10

## 2023-10-13 RX ADMIN — IPRATROPIUM BROMIDE AND ALBUTEROL SULFATE 3 ML: 2.5; .5 SOLUTION RESPIRATORY (INHALATION) at 08:10

## 2023-10-13 RX ADMIN — IPRATROPIUM BROMIDE AND ALBUTEROL SULFATE 3 ML: 2.5; .5 SOLUTION RESPIRATORY (INHALATION) at 11:10

## 2023-10-13 RX ADMIN — SEVELAMER CARBONATE 800 MG: 800 TABLET, FILM COATED ORAL at 08:10

## 2023-10-13 RX ADMIN — FLUTICASONE FUROATE AND VILANTEROL TRIFENATATE 1 PUFF: 200; 25 POWDER RESPIRATORY (INHALATION) at 11:10

## 2023-10-13 RX ADMIN — AZITHROMYCIN MONOHYDRATE 500 MG: 500 INJECTION, POWDER, LYOPHILIZED, FOR SOLUTION INTRAVENOUS at 03:10

## 2023-10-13 RX ADMIN — MIDODRINE HYDROCHLORIDE 15 MG: 5 TABLET ORAL at 11:10

## 2023-10-13 RX ADMIN — HEPARIN SODIUM 5000 UNITS: 5000 INJECTION INTRAVENOUS; SUBCUTANEOUS at 08:10

## 2023-10-13 RX ADMIN — IPRATROPIUM BROMIDE AND ALBUTEROL SULFATE 3 ML: 2.5; .5 SOLUTION RESPIRATORY (INHALATION) at 04:10

## 2023-10-13 RX ADMIN — MEGESTROL ACETATE 20 MG: 20 TABLET ORAL at 09:10

## 2023-10-13 RX ADMIN — PREDNISONE 40 MG: 20 TABLET ORAL at 08:10

## 2023-10-13 RX ADMIN — SEVELAMER CARBONATE 800 MG: 800 TABLET, FILM COATED ORAL at 09:10

## 2023-10-13 RX ADMIN — LORAZEPAM 0.5 MG: 0.5 TABLET ORAL at 09:10

## 2023-10-13 RX ADMIN — MIDODRINE HYDROCHLORIDE 10 MG: 5 TABLET ORAL at 08:10

## 2023-10-13 RX ADMIN — CEFTRIAXONE SODIUM 2 G: 2 INJECTION, POWDER, FOR SOLUTION INTRAMUSCULAR; INTRAVENOUS at 02:10

## 2023-10-13 RX ADMIN — CALCIUM GLUCONATE 1 G: 20 INJECTION, SOLUTION INTRAVENOUS at 06:10

## 2023-10-13 RX ADMIN — EPOETIN ALFA-EPBX 10000 UNITS: 10000 INJECTION, SOLUTION INTRAVENOUS; SUBCUTANEOUS at 11:10

## 2023-10-13 RX ADMIN — MUPIROCIN: 20 OINTMENT TOPICAL at 09:10

## 2023-10-13 RX ADMIN — SEVELAMER CARBONATE 800 MG: 800 TABLET, FILM COATED ORAL at 11:10

## 2023-10-13 NOTE — PLAN OF CARE
The sw met with the pt,Dre Quevedo(spouse)[phone] and Deisi(Caregiver) who were at bedside during the assessment. The pt was discharged from [facility] 10/9/23. [facility] and wants to resume with them. The pt's an hd pt at [facility](MWF at noon). The pt lives in [place] with her spouse. The pt's Caregiver Deisi assists her with her ADL's and transports her to dr sewell's,hd and errands. The pt has the dme listed above. The sw completed the white board in the pt's room with her name and contact info. The sw encouraged them to call if they have any further questions or concerns. The sw will continue to follow the pt throughout her transitions of care and will assist with any d/c needs.      [place] - Intensive Care  Initial Discharge Assessment       Primary Care Provider: Kingston Verduzco MD    Admission Diagnosis: Shortness of breath [R06.02]  Dyspnea [R06.00]  COPD exacerbation [J44.1]    Admission Date: 10/12/2023  Expected Discharge Date:     Transition of Care Barriers: (P) None    Payor: HU[employer] MANAGED MEDICARE / Plan: HU[employer] MEDICARE HMO / Product Type: Capitation /     Extended Emergency Contact Information  Primary Emergency Contact: Dre Quevedo  Address: [address] [place] [address] [place] [address] [facility]  Home Phone: [phone]  Mobile Phone: [phone]  Relation: Spouse  Secondary Emergency Contact: SaeNevaeh  Mobile Phone: [phone]  Relation: Grandchild    Discharge Plan A: (P) Home Health  Discharge Plan B: (P) Other (TBD)      [facility] [place] - [address] [facility] [address]  [address]  [place] [address]  Phone: [phone] Fax: [phone]    [facility]  [address]  [place] [address]  Phone: [phone] Fax: [phone]    [id number] - [place] - [address]  [address]  [place] [address]  Phone: [phone] Fax: [phone]    [facility]  [address]  [place] [address]  Phone: [phone] Fax: [phone]      Initial Assessment (most recent)       Adult Discharge Assessment - 10/13/23 1206          Discharge Assessment    Assessment Type Discharge Planning Assessment (P)      Confirmed/corrected address, phone number and insurance Yes (P)      Confirmed Demographics Correct on Facesheet (P)      Source of Information patient (P)      When was your last doctors appointment? -- (P)    1 month ago    Communicated LAITH with patient/caregiver Date not available/Unable to determine (P)      Reason For Admission Shortness of breath,Dyspnea,COPD exacerbation (P)      People in Home spouse (P)      Do you expect to return to your current living situation? Yes (P)      Do you have help at home or someone to help you manage your care at home? Yes (P)      Who are your caregiver(s) and their phone number(s)? Dre Quevedo(spouse)[phone]/Chintan Stack(dtr)[phone]/Deisi(Caregiver) (P)      Prior to hospitilization cognitive status: Alert/Oriented (P)      Current cognitive status: Alert/Oriented (P)      Walking or Climbing Stairs ambulation difficulty, requires equipment;stair climbing difficulty, requires equipment;transferring difficulty, requires equipment (P)      Home Accessibility wheelchair accessible (P)      Home Layout Able to live on 1st floor (P)      Equipment Currently Used at Home walker, rolling;power chair;nebulizer;oxygen (P)      Readmission within 30 days? Yes (P)    pt was discharged from [facility] 10/9/23    Patient currently being followed by outpatient case management? No (P)      Do you currently have service(s) that help you manage your care at home? No (P)      Do you take prescription medications? Yes (P)      Do you have prescription coverage? Yes (P)      Coverage [employer] Medicare, For Life (P)      Do you have any problems affording any of your prescribed medications? No (P)      Is  the patient taking medications as prescribed? yes (P)      Who is going to help you get home at discharge? Dre Quevedo(spouse)[phone]/Chintan Stack(dtr)[phone]/Deisi(Caregiver) (P)      How do you get to doctors appointments? family or friend will provide (P)      Are you on dialysis? Yes (P)      Dialysis Name and Scheduled days [facility](MWF)at noon (P)      Do you take coumadin? No (P)      DME Needed Upon Discharge  other (see comments) (P)    TBD    Discharge Plan discussed with: Patient;Spouse/sig other;Caregiver (P)      Name(s) and Number(s) Dre Quevedo(spouse)[phone]/Deisi(Caregiver) (P)      Transition of Care Barriers None (P)      Discharge Plan A Home Health (P)      Discharge Plan B Other (P)    TBD       Physical Activity    On average, how many days per week do you engage in moderate to strenuous exercise (like a brisk walk)? 0 days (P)      On average, how many minutes do you engage in exercise at this level? 0 min (P)         Financial Resource Strain    How hard is it for you to pay for the very basics like food, housing, medical care, and heating? Not hard at all (P)         Housing Stability    In the last 12 months, was there a time when you were not able to pay the mortgage or rent on time? No (P)      In the last 12 months, how many places have you lived? 1 (P)      In the last 12 months, was there a time when you did not have a steady place to sleep or slept in a shelter (including now)? No (P)         Transportation Needs    In the past 12 months, has lack of transportation kept you from medical appointments or from getting medications? No (P)      In the past 12 months, has lack of transportation kept you from meetings, work, or from getting things needed for daily living? No (P)         Food Insecurity    Within the past 12 months, you worried that your food would run out before you got the money to buy more. Never true (P)      Within the past 12 months, the food you bought just  didn't last and you didn't have money to get more. Never true (P)         Social Connections    In a typical week, how many times do you talk on the phone with family, friends, or neighbors? More than three times a week (P)      How often do you get together with friends or relatives? More than three times a week (P)      Are you , , , , never , or living with a partner?  (P)         OTHER    Name(s) of People in Home Dre Quevedo(spouse)[phone] (P)

## 2023-10-13 NOTE — PLAN OF CARE
Problem: Adult Inpatient Plan of Care  Goal: Plan of Care Review  Outcome: Ongoing, Progressing  -POC discussed with pt, spouse, and daughter at bedside.   -CTA complete.   -Consults to nephrology for possible midline due to multiple blown IVS this admission.

## 2023-10-13 NOTE — PLAN OF CARE
Problem: Adult Inpatient Plan of Care  Goal: Plan of Care Review  Outcome: Ongoing, Progressing  Goal: Patient-Specific Goal (Individualized)  Outcome: Ongoing, Progressing  Goal: Absence of Hospital-Acquired Illness or Injury  Outcome: Ongoing, Progressing  Goal: Optimal Comfort and Wellbeing  Outcome: Ongoing, Progressing     Problem: Adjustment to Illness (Sepsis/Septic Shock)  Goal: Optimal Coping  Outcome: Ongoing, Progressing     Problem: Bleeding (Sepsis/Septic Shock)  Goal: Absence of Bleeding  Outcome: Ongoing, Progressing     Problem: Glycemic Control Impaired (Sepsis/Septic Shock)  Goal: Blood Glucose Level Within Desired Range  Outcome: Ongoing, Progressing     Problem: Infection Progression (Sepsis/Septic Shock)  Goal: Absence of Infection Signs and Symptoms  Outcome: Ongoing, Progressing     Problem: Fluid Imbalance (Pneumonia)  Goal: Fluid Balance  Outcome: Ongoing, Progressing     Problem: Respiratory Compromise (Pneumonia)  Goal: Effective Oxygenation and Ventilation  Outcome: Ongoing, Progressing     Problem: Skin Injury Risk Increased  Goal: Skin Health and Integrity  Outcome: Ongoing, Progressing     Problem: Fall Injury Risk  Goal: Absence of Fall and Fall-Related Injury  Outcome: Ongoing, Progressing     POC reviewed with patient and family, all questions answered.

## 2023-10-13 NOTE — PT/OT/SLP EVAL
Physical Therapy Evaluation and Treatment    Patient Name:  Katie Quevedo   MRN:  [id number]    Recommendations:     Discharge Recommendations:  (TBD)   Discharge Equipment Recommendations: bedside commode   Barriers to discharge:  significantly impaired endurance    Assessment:     Katie Quevedo is a 78 y.o. female admitted with a medical diagnosis of The primary encounter diagnosis was Chronic obstructive pulmonary disease with acute exacerbation. Diagnoses of Shortness of breath, Dyspnea, Acute chest pain, SOB (shortness of breath), COPD exacerbation, ESRD (end stage renal disease) on dialysis, and Influenza A virus present were also pertinent to this visit.  She presents with the following impairments/functional limitations: weakness, gait instability, impaired balance, impaired endurance, impaired self care skills, impaired functional mobility, pain, decreased lower extremity function, decreased upper extremity function, impaired cardiopulmonary response to activity .Pt was only able to tolerate 1 sit>stand and side steps with RW and CGA due to limited endurance. SpO2 maintained >96% on 1 L, however, pt demonstrates increased anxiety, HR, RR, WOB, and fatigue. D/c recs TBD pending progress.    Rehab Prognosis: Fair; patient would benefit from acute skilled PT services to address these deficits and reach maximum level of function.    Recent Surgery: * No surgery found *      Plan:     During this hospitalization, patient to be seen 5 x/week to address the identified rehab impairments via therapeutic activities, gait training, therapeutic exercises, neuromuscular re-education and progress toward the following goals:    Plan of Care Expires:  11/13/23    Subjective     Chief Complaint: SOB  Patient/Family Comments/goals: pt agreeable to therapy; pt is pleasant  Pain/Comfort:  Pain Rating 1: 7/10  Location - Side 1: Bilateral  Location - Orientation 1: lower  Location 1: back  Pain Addressed 1: Reposition, Distraction,  Cessation of Activity, Nurse notified  Pain Rating Post-Intervention 1:  (not rated)    Patients cultural, spiritual, Hinduism conflicts given the current situation: no    Living Environment:  Pt lives with spouse in [place], thres to enter, walk in tub with built in seat   Prior to admission, patients level of function was mod I toileting, bed mobility; caregiver 5x/wk 9A-5P who assists with bathing and dsg; mostly sedentary - will only ambulate to bathroom and back to bed 2/2 SOB; wears 3L O2 24/7.  Pt reports no falls. Pt uses rollator for mobility; caregiver drives pt to dialysis.  Equipment used at home: rollator, walker, rolling, power chair, oxygen.  Upon discharge, patient will have assistance from spouse and caregiver.      Objective:     Communicated with nsg prior to session.  Patient found HOB elevated with blood pressure cuff, telemetry, pulse ox (continuous), peripheral IV, PureWick, oxygen  upon PT entry to room.    General Precautions: Standard, fall, respiratory  Orthopedic Precautions:N/A   Braces: N/A  Respiratory Status: Nasal cannula, flow 1 L/min    Exams:  Cognitive Exam:  Patient is oriented to Person, Place, Time, and Situation  Postural Exam:  Patient presented with the following abnormalities:    -       Rounded shoulders  Sensation:    -       Intact  light/touch BLE  Skin Integrity/Edema:      -       Skin integrity: Visible skin intact  -       Edema: None noted BLE  RLE ROM: WFL except lacking ~5-10 deg of terminal knee ext, hx of R TKA old scar  RLE Strength: grossly 4- to 4/5  LLE ROM: WFL  LLE Strength: grossly 4- to 4/5    Functional Mobility:  Bed Mobility:     Rolling Left:  contact guard assistance  Scooting: contact guard assistance  Supine to Sit: contact guard assistance  Sit to Supine: minimum assistance  Transfers:     Sit to Stand:  contact guard assistance with rolling walker  Gait: Pt took ~3-4 side steps R toward HOB with RW and CGA      AM-PAC 6 CLICK MOBILITY  Total  Score:16       Treatment & Education:  Pt educated on role of PT/POC and pt agreeable to participate  At rest O2 96% on 1L NC, .   Pt sat EOB with CGA and was only able to tolerate sitting ~3-4 minutes total   Pt stood and took side steps as reported above before needing to lay down due to increased anxiety, HR, RR, WOB, and fatigue  HR increased to 117-118  Pt shaking and with increased muscle tension in B shoulder once returned supine  Attempted PLB and calming techniques to decrease anxiety and HR 99 bpm at SpO2 97% at end of session  Pt educated on importance of pressure relief and turning to relieve pressure off buttocks and elevating heels to prevent ulcers due to increased time spent in bed  Pt educated on performing UE/LE therex ~5 reps at a time then increase to 10 reps once able to tolerate  RUE propped on pillow    Patient left HOB elevated with all lines intact, call button in reach, bed alarm on, nsg notified, and spouse and caregiver present.    GOALS:   Multidisciplinary Problems       Physical Therapy Goals          Problem: Physical Therapy    Goal Priority Disciplines Outcome Goal Variances Interventions   Physical Therapy Goal     PT, PT/OT Ongoing, Progressing     Description: Goals to be met by: 23     Patient will increase functional independence with mobility by performin. Sit to stand transfer with Supervision  2. Bed to chair transfer with Supervision using RW or rollator  3. Gait  x 50 feet with Supervision using rollator or RW.   4. Lower extremity exercise program x10 reps per handout, with independence                         History:     Past Medical History:   Diagnosis Date    Acute congestive heart failure 02/10/2020    Anemia     Bilateral renal cysts     Cataract     Chronic LBP 2012    Chronic pain     CKD (chronic kidney disease), stage IV     Colon polyp     COPD (chronic obstructive pulmonary disease)     Dehydration     Encounter for blood  transfusion     HTN (hypertension)     Lumbar spondylosis     Melanoma     of the lip    Metabolic bone disease     Migraines, neuralgic     Osteoporosis     Primary osteoarthritis of both knees     s/p Rt TKA    Pulmonary embolism with infarction     Seizures 1972    x1 only    Subdeltoid bursitis, L>R. 9/27/2012    Ulcer     Vitamin D deficiency disease        Past Surgical History:   Procedure Laterality Date    BLADDER SUSPENSION      CATARACT EXTRACTION  11/18/13    left eye    CERVICAL LAMINECTOMY      x3, fusion x1    COLONOSCOPY  2009    DECLOTTING OF ARTERIOVENOUS GRAFT Left 1/3/2022    Procedure: [id number];  Surgeon: Lindsey Louie MD;  Location: [facility] OR;  Service: Vascular;  Laterality: Left;    DECLOTTING OF ARTERIOVENOUS GRAFT Left 2/8/2022    Procedure: [id number];  Surgeon: Lindsey Louie MD;  Location: [facility] OR;  Service: Vascular;  Laterality: Left;    DECLOTTING OF ARTERIOVENOUS GRAFT Left 4/8/2022    Procedure: [id number];  Surgeon: Lindsey Louie MD;  Location: [facility] OR;  Service: Vascular;  Laterality: Left;    FISTULOGRAM N/A 2/27/2020    Procedure: Fistulogram;  Surgeon: Noe Benitez MD;  Location: [facility] CATH LAB/EP;  Service: Cardiology;  Laterality: N/A;    HYSTERECTOMY      JOINT REPLACEMENT  2001    total right knee     LUMBAR LAMINECTOMY      x 3, fusion x1    OOPHORECTOMY      PERIPHERAL ANGIOGRAPHY N/A 3/9/2020    Procedure: Peripheral angiography;  Surgeon: Jacinto Henriquez MD;  Location: [facility] CATH LAB/EP;  Service: Cardiology;  Laterality: N/A;    PLACEMENT OF ARTERIOVENOUS GRAFT Left 1/21/2020    Procedure: INSERTION, GRAFT, ARTERIOVENOUS;  Surgeon: Lindsey Louie MD;  Location: [facility] OR;  Service: General;  Laterality: Left;    PLACEMENT OF ARTERIOVENOUS GRAFT Right 7/22/2022    Procedure: INSERTION, GRAFT, ARTERIOVENOUS;  Surgeon: Lindsey Louie MD;  Location: [facility] OR;  Service: General;  Laterality: Right;    PLACEMENT OF  DUAL-LUMEN VASCULAR CATHETER Right 4/16/2022    Procedure: INSERTION-CATHETER-RICKI;  Surgeon: Lindsey Louie MD;  Location: [facility] OR;  Service: General;  Laterality: Right;    THROMBECTOMY Left 2/3/2020    Procedure: THROMBECTOMY;  Surgeon: Lindsey Louie MD;  Location: [facility] OR;  Service: General;  Laterality: Left;    THROMBECTOMY  3/9/2020    Procedure: Thrombectomy;  Surgeon: Jacinto Henriquez MD;  Location: [facility] CATH LAB/EP;  Service: Cardiology;;    THROMBECTOMY Left 4/7/2022    Procedure: THROMBECTOMY;  Surgeon: Lindsey Louie MD;  Location: [facility] OR;  Service: General;  Laterality: Left;       Time Tracking:     PT Received On: 10/13/23  PT Start Time: 1439     PT Stop Time: 1459  PT Total Time (min): 20 min with PT    Billable Minutes: Evaluation 10 and Therapeutic Activity 10      10/13/2023

## 2023-10-13 NOTE — PLAN OF CARE
Recommendation:  1. Encourage intake of meals & Novasource Renal as tolerated. 2. Monitor weight/labs.   3. RD to follow to monitor po intake    Goals:  Pt will tolerate diet with at least 50% intake at meals by RD follow up  Nutrition Goal Status: new

## 2023-10-13 NOTE — PLAN OF CARE
SLP orders received this date, pt may continue on current diet level, no audible dysphagia signs present. SPO2 levels remained at 93-95% during meal. Discussed pacing techniques and alternate sips and bites during meals.

## 2023-10-13 NOTE — PLAN OF CARE
Problem: Physical Therapy  Goal: Physical Therapy Goal  Description: Goals to be met by: 23     Patient will increase functional independence with mobility by performin. Sit to stand transfer with Supervision  2. Bed to chair transfer with Supervision using RW or rollator  3. Gait  x 50 feet with Supervision using rollator or RW.   4. Lower extremity exercise program x10 reps per handout, with independence    Outcome: Ongoing, Progressing     PT Eval completed, note to follow. Pt was only able to tolerate 1 sit>stand and side steps with RW and CGA due to limited endurance. SpO2 maintained >96% on 1 L, however, pt demonstrates increased anxiety, HR, RR, WOB, and fatigue. D/c recs TBD pending progress.

## 2023-10-13 NOTE — PROGRESS NOTES
"Patient being transported to Dialysis for treatment. Patient is stepdown status, just awaiting a bed. Patient has been using BiPap PRN for anxiety/ panic attacks. States she is SOB but VSS remains stable and breathing gets better with calming measures. Patient takes ativan nightly and before HD, PRN ativan given before. Dialysis nurse notified of patient's request for Bipap machine while on HD, per RN okay to bring it down and set it up for use. Charge RN and MD notified, okay for setup as it is not a want but more so a need for comfort. RT also notified of Bipap set up in HD suite. Patient brought to dialysis by RN and transporter.     Patient and family also requesting Bipap upon discharge for home. Patient states she is "afraid to go home without it". Patient already using O2 at home. Pulm CC, Primary and  working on home arrangements.   "

## 2023-10-13 NOTE — PLAN OF CARE
The sw was notified the pt and her family are requesting for a BIPAP for home use. The maria del carmen sent a secure chat to Christine at [facility] informing her of this info and that the dr will write the order for it.        10/13/23 1427   Post-Acute Status   Post-Acute Authorization [facility] Status Referrals Sent

## 2023-10-13 NOTE — PT/OT/SLP EVAL
Occupational Therapy   Evaluation    Name: Katie Quevedo  MRN: [id number]  Admitting Diagnosis: <principal problem not specified>  Recent Surgery: * No surgery found *      Recommendations:     Discharge Recommendations: other (see comments) (TBD)  Discharge Equipment Recommendations:  bedside commode  Barriers to discharge:  Other (Comment) (Pt requires increased level of assistance)    Assessment:     Katie Quevedo is a 78 y.o. female with a medical diagnosis of <principal problem not specified>.  She presents with The primary encounter diagnosis was Chronic obstructive pulmonary disease with acute exacerbation. Diagnoses of Shortness of breath, Dyspnea, Acute chest pain, SOB (shortness of breath), COPD exacerbation, ESRD (end stage renal disease) on dialysis, and Influenza A virus present were also pertinent to this visit. Performance deficits affecting function: weakness, impaired functional mobility, gait instability, impaired endurance, impaired balance, impaired self care skills, impaired cardiopulmonary response to activity, impaired skin, decreased lower extremity function, decreased upper extremity function.      Pt would benefit from cont OT services in order to maximize functional independence. Recommending TBD pending progress. Limited eval this date 2/2 pt easily fatigued & increased anxiety OOB. Pt performing side steps to HOB with CGA with use of RW; increased WOB & HR noted. Will progress as able.     Rehab Prognosis: Fair; patient would benefit from acute skilled OT services to address these deficits and reach maximum level of function.       Plan:     Patient to be seen 5 x/week to address the above listed problems via self-care/home management, therapeutic activities, therapeutic exercises  Plan of Care Expires: 11/13/23  Plan of Care Reviewed with: patient, spouse, caregiver    Subjective     Chief Complaint: SOB  Patient/Family Comments/goals: Agreeable to therapy    Occupational Profile:  Living  Environment: with spouse in SSH, thres to enter, walk in tub with built in seat   Previous level of function: mod I toileting, bed mobility; caregiver 5x/wk 9A-5P who assists with bathing and dsg; mostly sedentary - will only ambulate to bathroom and back to bed 2/2 SOB; wears 3L O2 24/7   Equipment Used at Home: rollator, walker, rolling, power chair  Assistance upon Discharge: from spouse and caregiver     Pain/Comfort:  Pain Rating 1: 0/10    Patients cultural, spiritual, Orthodox conflicts given the current situation: no    Objective:     Communicated with: nsg prior to session.  Patient found HOB elevated with blood pressure cuff, oxygen, telemetry, pulse ox (continuous), PureWick upon OT entry to room.    General Precautions: Standard, fall, respiratory  Orthopedic Precautions: N/A  Braces: N/A  Respiratory Status: Nasal cannula, flow 1 L/min    Occupational Performance:    Bed Mobility:    Patient completed Scooting/Bridging with contact guard assistance  Patient completed Supine to Sit with contact guard assistance  Patient completed Sit to Supine with minimum assistance    Functional Mobility/Transfers:  Patient completed Sit <> Stand Transfer with contact guard assistance  with  rolling walker   Functional Mobility: Pt taking side steps to HOB with CGA & use of RW    Cognitive/Visual Perceptual:  Cognitive/Psychosocial Skills:     -       Oriented to: Person, Place, Time, and Situation   -       Follows Commands/attention:Follows multistep  commands  -       Memory: No Deficits noted  -       Mood/Affect/Coping skills/emotional control: Cooperative and Pleasant    Physical Exam:  Sensation:    -       Intact  light/touch BUE  Upper Extremity Range of Motion:     -       Right Upper Extremity: WFL  -       Left Upper Extremity: WFL  Upper Extremity Strength:    -       Right Upper Extremity: 3+/5 distally  -       Left Upper Extremity: 3+5 distally   Strength:    -       Right Upper Extremity:  Fair+  -       Left Upper Extremity: Fair+    AMPAC 6 Click ADL:  AMPAC Total Score: 16    Treatment & Education:  Pt would benefit from cont OT services in order to maximize functional independence.   At rest O2 96% on 1L NC, .  Limited eval this date 2/2 pt easily fatigued & increased anxiety OOB.   Pt performing side steps to HOB with CGA with use of RW; increased WOB & HR noted.   O2 96-97% after return to supine, HR up to 117-118 after mobility.  Pt edcuated on BUE exs as tolerated throughout the day.  PT educating on pressure relief importance while in bed.   Will progress as able.     Patient left HOB elevated with all lines intact, call button in reach, bed alarm on, nsg notified, and spouse & caregiver present    GOALS:   Multidisciplinary Problems       Occupational Therapy Goals          Problem: Occupational Therapy    Goal Priority Disciplines Outcome Interventions   Occupational Therapy Goal     OT, PT/OT Ongoing, Progressing    Description: Goals to be met by: 11/13/2023     Patient will increase functional independence with ADLs by performing:    Grooming while seated with Set-up Assistance.  Toileting from bedside commode with Stand-by Assistance for hygiene and clothing management.   Rolling to Bilateral with Modified [place].   Supine to sit with Modified [place].  Step transfer with Stand-by Assistance & appropriate AD.  Toilet transfer to bedside commode with Stand-by Assistance & appropriate AD.                         History:     Past Medical History:   Diagnosis Date    Acute congestive heart failure 02/10/2020    Anemia     Bilateral renal cysts     Cataract     Chronic LBP 7/26/2012    Chronic pain     CKD (chronic kidney disease), stage IV     Colon polyp 2013    COPD (chronic obstructive pulmonary disease)     Dehydration     Encounter for blood transfusion     HTN (hypertension)     Lumbar spondylosis     Melanoma     of the lip    Metabolic bone disease     Migraines,  neuralgic     Osteoporosis     Primary osteoarthritis of both knees     s/p Rt TKA    Pulmonary embolism with infarction     Seizures 1972    x1 only    Subdeltoid bursitis, L>R. 9/27/2012    Ulcer     Vitamin D deficiency disease          Past Surgical History:   Procedure Laterality Date    BLADDER SUSPENSION      CATARACT EXTRACTION  11/18/13    left eye    CERVICAL LAMINECTOMY      x3, fusion x1    COLONOSCOPY  2009    DECLOTTING OF ARTERIOVENOUS GRAFT Left 1/3/2022    Procedure: [id number];  Surgeon: Lindsey Louie MD;  Location: [facility] OR;  Service: Vascular;  Laterality: Left;    DECLOTTING OF ARTERIOVENOUS GRAFT Left 2/8/2022    Procedure: [id number]-SPRRJ-PP;  Surgeon: Lindsey Louie MD;  Location: [facility] OR;  Service: Vascular;  Laterality: Left;    DECLOTTING OF ARTERIOVENOUS GRAFT Left 4/8/2022    Procedure: [id number];  Surgeon: Lindsey Louie MD;  Location: [facility] OR;  Service: Vascular;  Laterality: Left;    FISTULOGRAM N/A 2/27/2020    Procedure: Fistulogram;  Surgeon: Noe Benitez MD;  Location: [facility] CATH LAB/EP;  Service: Cardiology;  Laterality: N/A;    HYSTERECTOMY      JOINT REPLACEMENT  2001    total right knee     LUMBAR LAMINECTOMY      x 3, fusion x1    OOPHORECTOMY      PERIPHERAL ANGIOGRAPHY N/A 3/9/2020    Procedure: Peripheral angiography;  Surgeon: Jacinto Henriquez MD;  Location: [facility] CATH LAB/EP;  Service: Cardiology;  Laterality: N/A;    PLACEMENT OF ARTERIOVENOUS GRAFT Left 1/21/2020    Procedure: INSERTION, GRAFT, ARTERIOVENOUS;  Surgeon: Lindsey Louie MD;  Location: [facility] OR;  Service: General;  Laterality: Left;    PLACEMENT OF ARTERIOVENOUS GRAFT Right 7/22/2022    Procedure: INSERTION, GRAFT, ARTERIOVENOUS;  Surgeon: Lindsey Louie MD;  Location: [facility] OR;  Service: General;  Laterality: Right;    PLACEMENT OF DUAL-LUMEN VASCULAR CATHETER Right 4/16/2022    Procedure: INSERTION-CATHETER-RICKI;  Surgeon: Lindsey Louie MD;   Location: [facility] OR;  Service: General;  Laterality: Right;    THROMBECTOMY Left 2/3/2020    Procedure: THROMBECTOMY;  Surgeon: Lindsey Louie MD;  Location: [facility] OR;  Service: General;  Laterality: Left;    THROMBECTOMY  3/9/2020    Procedure: Thrombectomy;  Surgeon: Jacinto Henriquez MD;  Location: [facility] CATH LAB/EP;  Service: Cardiology;;    THROMBECTOMY Left 4/7/2022    Procedure: THROMBECTOMY;  Surgeon: Lindsey Louie MD;  Location: [facility] OR;  Service: General;  Laterality: Left;       Time Tracking:     OT Date of Treatment: 10/13/23  OT Start Time: 1439  OT Stop Time: 1459  OT Total Time (min): 20 min w/PT    Billable Minutes:Evaluation 10  Therapeutic Activity 10    10/13/2023

## 2023-10-13 NOTE — NURSING
@ 1730 pt transported via bed to CT by this RN and Transport Tech. Pt on portable cardiac monitoring and 1LNC. Pt transported without incident.   @1755 pt transported back to ICU  by this RN and transport tech without incident. Pt tolerated trip well.  at bedside, personal belongings within reach, and bed in lowest position with bed alarm armed. Will continue with plan of care ordered.

## 2023-10-13 NOTE — CONSULTS
Nephrology Consult  H&P      Consult Requested By: Franchesca Tillman MD  Reason for Consult: ESRD     SUBJECTIVE:     History of Present Illness:  Katie Quevedo is a 78 y.o.   female who  has a past medical history of  ESRD on HD via [facility] with Dr Aura Dgigs, Acute congestive heart failure (02/10/2020), Anemia, Bilateral renal cysts, Cataract, Chronic LBP (7/26/2012), Chronic pain, CKD (chronic kidney disease), stage IV, Colon polyp (2013), COPD (chronic obstructive pulmonary disease), Dehydration, Encounter for blood transfusion, HTN (hypertension), Lumbar spondylosis, Melanoma, Metabolic bone disease, Migraines, neuralgic, Osteoporosis, Primary osteoarthritis of both knees, Pulmonary embolism with infarction, Seizures (1972), Subdeltoid bursitis, L>R. (9/27/2012), Ulcer, and Vitamin D deficiency disease.. The patient presented to the [facility] on 10/12/2023 with a primary complaint of SOB  again HD Wednesday  but was not able to pull fluids due to BP low  again       ?    Review of Systems   Constitutional:  Negative for chills, fever and malaise/fatigue.   HENT:  Negative for congestion and sore throat.    Eyes:  Negative for blurred vision, double vision and photophobia.   Respiratory:  Positive for shortness of breath. Negative for cough.    Cardiovascular:  Negative for chest pain, palpitations and leg swelling.   Gastrointestinal:  Negative for abdominal pain, diarrhea, nausea and vomiting.   Genitourinary:  Negative for dysuria and urgency.   Musculoskeletal:  Negative for joint pain and myalgias.   Skin:  Negative for itching and rash.   Neurological:  Negative for dizziness, sensory change, weakness and headaches.   Endo/Heme/Allergies:  Negative for polydipsia. Does not bruise/bleed easily.   Psychiatric/Behavioral:  Negative for depression.        Past Medical History:   Diagnosis Date    Acute congestive heart failure 02/10/2020    Anemia     Bilateral renal cysts     Cataract     Chronic  LBP 7/26/2012    Chronic pain     CKD (chronic kidney disease), stage IV     Colon polyp 2013    COPD (chronic obstructive pulmonary disease)     Dehydration     Encounter for blood transfusion     HTN (hypertension)     Lumbar spondylosis     Melanoma     of the lip    Metabolic bone disease     Migraines, neuralgic     Osteoporosis     Primary osteoarthritis of both knees     s/p Rt TKA    Pulmonary embolism with infarction     Seizures 1972    x1 only    Subdeltoid bursitis, L>R. 9/27/2012    Ulcer     Vitamin D deficiency disease      Past Surgical History:   Procedure Laterality Date    BLADDER SUSPENSION      CATARACT EXTRACTION  11/18/13    left eye    CERVICAL LAMINECTOMY      x3, fusion x1    COLONOSCOPY  2009    DECLOTTING OF ARTERIOVENOUS GRAFT Left 1/3/2022    Procedure: [id number];  Surgeon: Lindsey Louie MD;  Location: [facility] OR;  Service: Vascular;  Laterality: Left;    DECLOTTING OF ARTERIOVENOUS GRAFT Left 2/8/2022    Procedure: [id number];  Surgeon: Lindsey Louie MD;  Location: [facility] OR;  Service: Vascular;  Laterality: Left;    DECLOTTING OF ARTERIOVENOUS GRAFT Left 4/8/2022    Procedure: [id number]-EDBFD-FY;  Surgeon: Lindsey Louie MD;  Location: [facility] OR;  Service: Vascular;  Laterality: Left;    FISTULOGRAM N/A 2/27/2020    Procedure: Fistulogram;  Surgeon: Noe Benitez MD;  Location: [facility] CATH LAB/EP;  Service: Cardiology;  Laterality: N/A;    HYSTERECTOMY      JOINT REPLACEMENT  2001    total right knee     LUMBAR LAMINECTOMY      x 3, fusion x1    OOPHORECTOMY      PERIPHERAL ANGIOGRAPHY N/A 3/9/2020    Procedure: Peripheral angiography;  Surgeon: Jacinto Henriquez MD;  Location: [facility] CATH LAB/EP;  Service: Cardiology;  Laterality: N/A;    PLACEMENT OF ARTERIOVENOUS GRAFT Left 1/21/2020    Procedure: INSERTION, GRAFT, ARTERIOVENOUS;  Surgeon: Lindsey Louie MD;  Location: [facility] OR;  Service: General;  Laterality: Left;    PLACEMENT OF ARTERIOVENOUS  GRAFT Right 7/22/2022    Procedure: INSERTION, GRAFT, ARTERIOVENOUS;  Surgeon: Lindsey Louie MD;  Location: [facility] OR;  Service: General;  Laterality: Right;    PLACEMENT OF DUAL-LUMEN VASCULAR CATHETER Right 4/16/2022    Procedure: INSERTION-CATHETER-RICKI;  Surgeon: Lindsey Louie MD;  Location: [facility] OR;  Service: General;  Laterality: Right;    THROMBECTOMY Left 2/3/2020    Procedure: THROMBECTOMY;  Surgeon: Lindsey Louie MD;  Location: [facility] OR;  Service: General;  Laterality: Left;    THROMBECTOMY  3/9/2020    Procedure: Thrombectomy;  Surgeon: Jacinto Henriquez MD;  Location: [facility] CATH LAB/EP;  Service: Cardiology;;    THROMBECTOMY Left 4/7/2022    Procedure: THROMBECTOMY;  Surgeon: Lindsey Louie MD;  Location: [facility] OR;  Service: General;  Laterality: Left;     Family History   Problem Relation Age of Onset    Arthritis Mother     Stroke Mother     Hypertension Father     Cancer Father     Cataracts Father     Diabetes Maternal Aunt     Hypertension Maternal Grandfather     Heart disease Maternal Grandfather     Heart attack Maternal Grandfather     Cataracts Sister     Glaucoma Cousin      Social History     Tobacco Use    Smoking status: Former     Types: Cigarettes    Smokeless tobacco: Former     Quit date: 2/3/2015   Substance Use Topics    Alcohol use: Not Currently     Comment: Rare    Drug use: No       Review of patient's allergies indicates:   Allergen Reactions    Aspirin      Other reaction(s): hx of ulcers    Tetracycline Swelling     Other reaction(s): Swelling    Penicillins Rash     Other reaction(s): Hives  Other reaction(s): Rash  Other reaction(s): Rash  Other reaction(s): Hives            OBJECTIVE:     Vital Signs (Most Recent)  Vitals:    10/13/23 0745 10/13/23 0800 10/13/23 0830 10/13/23 0900   BP: (!) 101/53 (!) 104/53 (!) 143/60 (!) 133/57   BP Location:       Patient Position:       Pulse: 102 106 107 (!) 111   Resp: 19 (!) 24 (!) 21 (!) 24   Temp:  97.7 °F  (36.5 °C)     TempSrc:  Oral     SpO2: 98% 97% 97% 99%   Weight:       Height:                     Medications:          Physical Exam  Vitals and nursing note reviewed.   Constitutional:       General: She is not in acute distress.     Appearance: She is ill-appearing. She is not diaphoretic.   HENT:      Head: Normocephalic and atraumatic.      Mouth/Throat:      Pharynx: No oropharyngeal exudate.   Eyes:      General: No scleral icterus.     Conjunctiva/sclera: Conjunctivae normal.      Pupils: Pupils are equal, round, and reactive to light.   Cardiovascular:      Rate and Rhythm: Normal rate and regular rhythm.      Heart sounds: Normal heart sounds. No murmur heard.  Pulmonary:      Effort: Pulmonary effort is normal. No respiratory distress.      Breath sounds: Rales present. No wheezing.   Abdominal:      General: Bowel sounds are normal. There is no distension.      Palpations: Abdomen is soft.      Tenderness: There is no abdominal tenderness.   Musculoskeletal:         General: Normal range of motion.      Cervical back: Normal range of motion and neck supple.      Right lower leg: No edema.      Left lower leg: No edema.      Comments:    CVCRIJ    Skin:     General: Skin is warm and dry.      Findings: No erythema.   Neurological:      Mental Status: She is alert and oriented to person, place, and time.      Cranial Nerves: No cranial nerve deficit.   Psychiatric:         Mood and Affect: Affect normal.         Cognition and Memory: Memory normal.         Judgment: Judgment normal.         Laboratory:  Recent Labs   Lab 10/08/23  0405 10/12/23  1232 10/12/23  1248 10/13/23  0341   WBC 13.20* 16.24*  --  7.80   HGB 11.5* 11.9*  --  9.7*   HCT 36.4* 37.1 35.8* 30.6*    199  --  159   MONO 3.9*  0.5 8.3  1.4*  --  2.7*  0.2*   EOSINOPHIL 0.0 3.0  --  0.0       Recent Labs   Lab 10/08/23  0405 10/12/23  1232 10/13/23  0341    139 127*   K 5.7* 4.6 5.4*    100 96   CO2 15* 23 17*   BUN  67* 36* 55*   CREATININE 5.0* 4.3* 5.3*   CALCIUM 8.3* 9.1 8.0*   PHOS 5.2*  --  4.2         Diagnostic Results:  X-Ray: Reviewed  US: Reviewed  Echo: Reviewed  ASSESSMENT/PLAN:     1. ESRD  - HD [facility] With Dr Aura Diggs   -- This  Wednesday at her Unit  with minimal pull  due to Hypotension and tachycardia   -- Now here again SOB Hyperkalemia needs HD with [facility]   Give midodrine  before HD stop lasix   Can take lasix on TTSS as its contributing to her hypotension     -- Keep MWF after   -- Daily Renal Function Panel  -- Avoid Hypotension.  -- Renally dose all meds    2. HTN (I10) -  Controlled   3. Anemia of chronic kidney disease treated with BILL     EPogen  with   HD as needed    Recent Labs   Lab 10/08/23  0405 10/12/23  1232 10/12/23  1248 10/13/23  0341   HGB 11.5* 11.9*  --  9.7*   HCT 36.4* 37.1 35.8* 30.6*    199  --  159         Iron   Lab Results   Component Value Date    IRON 13 (L) 02/12/2020    TIBC 462 (H) 02/12/2020    FERRITIN 148 07/17/2019       4. MBD (E88.9 M90.80) -  Recent Labs   Lab 10/13/23  0341   CALCIUM 8.0*   PHOS 4.2       Recent Labs   Lab 10/08/23  0405 10/12/23  1232 10/12/23  1552   MG 2.3 2.2 2.1         Lab Results   Component Value Date    .0 (H) 12/28/2018    CALCIUM 8.0 (L) 10/13/2023    PHOS 4.2 10/13/2023     Lab Results   Component Value Date    [id number] 26 (L) 02/12/2020     Sevelamer   Lab Results   Component Value Date    CO2 17 (L) 10/13/2023       5. Nutrition/Hypoalbuminemia   Recent Labs   Lab 10/08/23  0039 10/13/23  0341   ALBUMIN 3.7 2.7*       Nepro with meals TID. Renal vitamins daily    Total critical care time 45 minutes: included management of organ failures related to critical illness, titration of continuous infusions, review of pertinent labs and imaging studies, discussion with primary team and family.    Thank you for the consult, will follow  With any question please call [phone]  Ramo Da Silva MD    [facility]  EDGARD Bustillos MD, FACPNEIL MD,   MD CURTIS Benoit MD E. V. Harmon, NP    [address]  [place], [address] [phone]

## 2023-10-13 NOTE — PT/OT/SLP EVAL
Speech Language Pathology Evaluation  Bedside Swallow    Patient Name:  Katie Quevedo   MRN:  [id number]  Admitting Diagnosis: <principal problem not specified>    Recommendations:                 General Recommendations:  no further skilled ST needs  Diet recommendations:  Regular, Thin   Aspiration Precautions: upright for meals, pacing, slow rate, alternate sips and bites, whole meds one by one with liquids.    General Precautions: Standard, fall, respiratory  Communication strategies:  none    Assessment:     Katie Quevedo is a 78 y.o. female admitted with dyspnea and has hx of COPD with an SLP diagnosis timely oral and pharyngeal phase of the swallow, no evidence per bedside of airway threat.      History per MD     COPD exacerbation     Subjective:      History of Present Illness:  Katie Quevedo is a 78 y.o.  female with a PMH of CHF, COPD, HTN, ESRD on HD MWF who presented to the [facility] ED on 10/12/2023 with a primary complaint of Shortness of Breath for the past one day.      Patient has had numerous hospitalizations for dyspnea in the past 4 years. She previously had thoracenteses in 6/2019 and 7/2019, which appeared to be transudative based on Light's criteria. Cytology ruled out malignancy at the time as it was suspected initially. It was previously reported by pulmonology that patient tends to hyperventilate at night / during dialysis. She is a former smoker who quit about 9 years ago.      Patient reports using Trelegy at home. Her dyspnea is unprovoked and she feels SOB at rest. Denies fever, chill, cough, chest pain. Denies recent illness other than the pneumonia for which she was recently hospitalized and has not yet completed a full course of abx for. Today, she was admitted to the ICU for management of her COPD exacerbation.       Past Medical History:   Diagnosis Date    Acute congestive heart failure 02/10/2020    Anemia     Bilateral renal cysts     Cataract     Chronic LBP 7/26/2012    Chronic  pain     CKD (chronic kidney disease), stage IV     Colon polyp 2013    COPD (chronic obstructive pulmonary disease)     Dehydration     Encounter for blood transfusion     HTN (hypertension)     Lumbar spondylosis     Melanoma     of the lip    Metabolic bone disease     Migraines, neuralgic     Osteoporosis     Primary osteoarthritis of both knees     s/p Rt TKA    Pulmonary embolism with infarction     Seizures 1972    x1 only    Subdeltoid bursitis, L>R. 9/27/2012    Ulcer     Vitamin D deficiency disease        Past Surgical History:   Procedure Laterality Date    BLADDER SUSPENSION      CATARACT EXTRACTION  11/18/13    left eye    CERVICAL LAMINECTOMY      x3, fusion x1    COLONOSCOPY  2009    DECLOTTING OF ARTERIOVENOUS GRAFT Left 1/3/2022    Procedure: [id number];  Surgeon: Lindsey Louie MD;  Location: [facility] OR;  Service: Vascular;  Laterality: Left;    DECLOTTING OF ARTERIOVENOUS GRAFT Left 2/8/2022    Procedure: [id number];  Surgeon: Lindsey Louie MD;  Location: [facility] OR;  Service: Vascular;  Laterality: Left;    DECLOTTING OF ARTERIOVENOUS GRAFT Left 4/8/2022    Procedure: [id number];  Surgeon: Lindsey Louie MD;  Location: [facility] OR;  Service: Vascular;  Laterality: Left;    FISTULOGRAM N/A 2/27/2020    Procedure: Fistulogram;  Surgeon: Noe Benitez MD;  Location: [facility] CATH LAB/EP;  Service: Cardiology;  Laterality: N/A;    HYSTERECTOMY      JOINT REPLACEMENT  2001    total right knee     LUMBAR LAMINECTOMY      x 3, fusion x1    OOPHORECTOMY      PERIPHERAL ANGIOGRAPHY N/A 3/9/2020    Procedure: Peripheral angiography;  Surgeon: Jacinto Henriquez MD;  Location: [facility] CATH LAB/EP;  Service: Cardiology;  Laterality: N/A;    PLACEMENT OF ARTERIOVENOUS GRAFT Left 1/21/2020    Procedure: INSERTION, GRAFT, ARTERIOVENOUS;  Surgeon: Lindsey Louie MD;  Location: [facility] OR;  Service: General;  Laterality: Left;    PLACEMENT OF ARTERIOVENOUS GRAFT Right 7/22/2022  "   Procedure: INSERTION, GRAFT, ARTERIOVENOUS;  Surgeon: Lindsey Louie MD;  Location: [facility] OR;  Service: General;  Laterality: Right;    PLACEMENT OF DUAL-LUMEN VASCULAR CATHETER Right 4/16/2022    Procedure: INSERTION-CATHETER-RICKI;  Surgeon: Lindsey Louie MD;  Location: [facility] OR;  Service: General;  Laterality: Right;    THROMBECTOMY Left 2/3/2020    Procedure: THROMBECTOMY;  Surgeon: Lindsey Louie MD;  Location: [facility] OR;  Service: General;  Laterality: Left;    THROMBECTOMY  3/9/2020    Procedure: Thrombectomy;  Surgeon: Jacinto Henriquez MD;  Location: [facility] CATH LAB/EP;  Service: Cardiology;;    THROMBECTOMY Left 4/7/2022    Procedure: THROMBECTOMY;  Surgeon: Lindsey Louie MD;  Location: [facility] OR;  Service: General;  Laterality: Left;       Social History: Patient lives in [place] with spouse, has two adult children, retired.    Prior Intubation HX:  n/a    Modified Barium Swallow: none per dtr or self reported by patient    Chest X-Rays: 1. Grossly stable appearing cardiopulmonary findings noting pleural effusions and bilateral basilar subsegmental atelectasis.  Developing left lower lung zone consolidation not excluded.     CTA of the chest: 1. No evidence of acute pulmonary thromboembolism.   2. Small area of consolidation and parapneumonic effusion in the left lower lobe.  Correlate for area of chronic atelectasis and effusion versus pneumonia.   3. Fibrotic and emphysematous changes throughout the lungs.   Prior diet: reg/thin .    Occupation/hobbies/homemaking: retired.    Subjective     Consult received for clinical swallow eval this date, SLP did communicate with RN prior to eval/treat.    Patient goals: "this breakfast looks good."     Pain/Comfort:  Pain Rating 1: 0/10    Respiratory Status: NC 2 liters     Objective:   Pt found in ICU, dtr and spouse present. RN did ok for eval and reported mild choking incident on large pill.   Pt was awake, upright and about to eat " breakfast meal from aCon that dtr had brought in for her.   Pt follows commands and is verbal.     Oral Musculature Evaluation  Oral Musculature: WFL  Dentition: present and adequate  Mucosal Quality: good, adequate  Mandibular Strength and Mobility: WFL  Oral Labial Strength and Mobility: WFL  Lingual Strength and Mobility: WFL  Buccal Strength and Mobility: WFL  Volitional Cough: elicited  Volitional Swallow: timely swallow  Voice Prior to PO Intake: clear voice    Bedside Swallow Eval: Pt assessed with breakfast meal in session  Consistencies Assessed:  Ice chips, water by cup and coffee by cup edge swallows  Grits  Scrambled eggs and piece of sausage emma     Oral Phase:   WFL  Fair masticatory effort noted     Pharyngeal Phase:   no overt clinical signs/symptoms of aspiration  no overt clinical signs/symptoms of pharyngeal dysphagia  multiple spontaneous swallows  No audible coughing or choking across PO trials  SPO2 levels remained at 93-94% during session    Compensatory Strategies  Multiple swallows   Alternate sips and bites  Upright for meals  Single straw sips      Treatment: Skilled education including role of SLP, swallow precautions, s/sx of dysphagia were discussed and appropriate diet instructions were reviewed with all parties including patient and dtr in room. Pt demo'd understanding of info provided and all questions were answered within SLP scope of practice.       Goals:   Multidisciplinary Problems       SLP Goals       Not on file              Multidisciplinary Problems (Resolved)          Problem: SLP    Goal Priority Disciplines Outcome   SLP Goal   (Resolved)     SLP Met                       Plan:     Patient to be seen:      Plan of Care expires:     Plan of Care reviewed with:  patient, daughter, spouse   SLP Follow-Up:  No       Discharge recommendations:   (low intensity therap at home, PT and OT)   Barriers to Discharge:  none    Time Tracking:     SLP Treatment Date:    10/13/23  Speech Start Time:  1015  Speech Stop Time:  1046     Speech Total Time (min):  31 min    Billable Minutes: Eval Swallow and Oral Function 20 and Self Care/Home Management Training 11    10/13/2023

## 2023-10-13 NOTE — PT/OT/SLP PROGRESS
Occupational Therapy      Patient Name:  Katie Quevedo   MRN:  [id number]    Patient not seen today secondary to Testing/imaging (xray/CT/MRI). Will follow-up as able.    10/13/2023

## 2023-10-13 NOTE — PLAN OF CARE
Pt would benefit from cont OT services in order to maximize functional independence. Recommending TBD pending progress. Limited eval this date 2/2 pt easily fatigued & increased anxiety OOB. Pt performing side steps to HOB with CGA with use of RW; increased WOB & HR noted. Will progress as able.     Problem: Occupational Therapy  Goal: Occupational Therapy Goal  Description: Goals to be met by: 11/13/2023     Patient will increase functional independence with ADLs by performing:    Grooming while seated with Set-up Assistance.  Toileting from bedside commode with Stand-by Assistance for hygiene and clothing management.   Rolling to Bilateral with Modified [place].   Supine to sit with Modified [place].  Step transfer with Stand-by Assistance & appropriate AD.  Toilet transfer to bedside commode with Stand-by Assistance & appropriate AD.    Outcome: Ongoing, Progressing

## 2023-10-13 NOTE — NURSING
Informed MD Elmo pt was hypotensive, MAP 60-70s, HD pt when chronic hypertension on Midodrine, Notified MD pt requesting ativan. MD stated no new orders and continue to monitor. Held Mirtazapine, but pt AAO x 4, no S/S of hypotension

## 2023-10-13 NOTE — PLAN OF CARE
The sw faxed the pt's info to her current  [facility] [place] to notify them of her hospital admit.        10/13/23 8811   Post-Acute Status   Post-Acute Authorization Home Health   Discharge Plan   Discharge Plan A Home Health   Discharge Plan B Skilled Nursing Facility

## 2023-10-13 NOTE — PLAN OF CARE
Problem: Adult Inpatient Plan of Care  Goal: Plan of Care Review  Outcome: Ongoing, Progressing   Pt AAO x4, BIPAP 10/5 25% overnight, Bladder Scan 93 @0501, PRN Norco, Tylenol. PT had an episode of anxiety. Episode resolved with BIPAP administration and PRN Norco. Daughter at bedside overnight.    Problem: Adult Inpatient Plan of Care  Goal: Absence of Hospital-Acquired Illness or Injury  Outcome: Ongoing, Progressing     Problem: Fluid Imbalance (Pneumonia)  Goal: Fluid Balance  Outcome: Ongoing, Progressing     Problem: Skin Injury Risk Increased  Goal: Skin Health and Integrity  Outcome: Ongoing, Progressing     Problem: Fall Injury Risk  Goal: Absence of Fall and Fall-Related Injury  Outcome: Ongoing, Progressing

## 2023-10-13 NOTE — PROGRESS NOTES
"[facility] - Intensive Care  Adult Nutrition  Progress Note    SUMMARY       Recommendations    Recommendation:  1. Encourage intake of meals & Novasource Renal as tolerated. 2. Monitor weight/labs.   3. RD to follow to monitor po intake    Goals:  Pt will tolerate diet with at least 50% intake at meals by RD follow up  Nutrition Goal Status: new  Communication of RD Recs: reviewed with RN    Assessment and Plan  Nutrition Problem  Altered Nutrition Related Lab Values    Related to (etiology):   Dx/hx    Signs and Symptoms (as evidenced by):   Na 127L, K 5.4H, BUN 55H, Crea 5.3H, Ca 8.0L, Alb 2.7L     Interventions:  Collaboration with other providers    Nutrition Diagnosis Status:   New      Malnutrition Assessment     Weight Loss (Malnutrition):  (3% x 6 months)       Reason for Assessment  Reason For Assessment: identified at risk by screening criteria ([facility])  Diagnosis:  (SOB)  Relevant Medical History: COPD, osteoporosis, HTN, bilateral renal cysts, PE, CKD, seziures, CHF, lumbar laminectomy, hysterectomy, thrombectomy  General Information Comments: Pt on Cardiac Renal diet with Novasource Renal. Receives HD. Noted 3lb weight loss x 6 months. Ernesto 14-skin intact. Pt with MD team at visit-unable to assess NFPE.  Nutrition Discharge Planning: pt to d/c on Cardiac Renal diet    Nutrition Risk Screen  Nutrition Risk Screen: no indicators present    Nutrition/Diet History  Food Preferences: no religioyus or cultural food prefs identified  Spiritual, Cultural Beliefs, Jewish Practices, Values that Affect Care: no  Factors Affecting Nutritional Intake: None identified at this time    Anthropometrics  Temp: 97.8 °F (36.6 °C)  Height Method: Stated  Height: 5' 2" (157.5 cm)  Height (inches): 62 in  Weight Method: Bed Scale  Weight: 40.8 kg (89 lb 15.2 oz)  Weight (lb): 89.95 lb  Ideal Body Weight (IBW), Female: 110 lb  % Ideal Body Weight, Female (lb): 81.77 %  BMI (Calculated): 16.4  BMI Grade: 16 - 16.9 protein-energy " malnutrition grade II  Usual Body Weight (UBW), k.4 kg ()  % Usual Body Weight: 96.43  % Weight Change From Usual Weight: -3.77 %    Lab/Procedures/Meds  Pertinent Labs Reviewed: reviewed  Pertinent Labs Comments: Na 127L, K 5.4H, BUN 55H, Crea 5.3H, GLu 132H, Ca 8.0L, Alb 2.7L  Pertinent Medications Reviewed: reviewed  Pertinent Medications Comments: azithromycin, rocephin, Lasix, heparin, megace, prednisone    Estimated/Assessed Needs  Weight Used For Calorie Calculations: 40.8 kg (89 lb 15.2 oz)  Energy Calorie Requirements (kcal): 1428 (35 kcal/kg)  Energy Need Method: Kcal/kg  Protein Requirements: 53g (1.3g/kg)  Weight Used For Protein Calculations: 40.8 kg (89 lb 15.2 oz)  Estimated Fluid Requirement Method: RDA Method  RDA Method (mL): 1428    Nutrition Prescription Ordered  Current Diet Order: Cardiac Renal  Oral Nutrition Supplement: Novasource Renal    Evaluation of Received Nutrient/Fluid Intake  I/O: 632/0  Energy Calories Required: not meeting needs  Protein Required: not meeting needs  Fluid Required: not meeting needs  Comments: LBM 10/12  % Intake of Estimated Energy Needs: 25 - 50 %  % Meal Intake: 25 - 50 %    Nutrition Risk  Level of Risk/Frequency of Follow-up:  (2xweekly)     Monitor and Evaluation  Food and Nutrient Intake: food and beverage intake  Food and Nutrient Adminstration: diet order  Physical Activity and Function: nutrition-related ADLs and IADLs  Anthropometric Measurements: weight  Biochemical Data, Medical Tests and Procedures: electrolyte and renal panel  Nutrition-Focused Physical Findings: overall appearance     Nutrition Follow-Up  RD Follow-up?: Yes

## 2023-10-13 NOTE — PROGRESS NOTES
"[facility] Progress Note    Subjective:      Seen with  and daughter at bedside today. Has no current complaints. Denies dyspnea, however, still has increased work of breathing. Scheduled HD today with [facility].      Objective:     Last 24 Hour Vital Signs:  BP  Min: 73/47  Max: 143/60  Temp  Av.9 °F (36.6 °C)  Min: 97.7 °F (36.5 °C)  Max: 98.4 °F (36.9 °C)  Pulse  Av.6  Min: 96  Max: 127  Resp  Av.3  Min: 16  Max: 38  SpO2  Av.8 %  Min: 94 %  Max: 100 %  Height  Av' 2" (157.5 cm)  Min: 5' 2" (157.5 cm)  Max: 5' 2" (157.5 cm)  Weight  Av.9 kg (90 lb 3.1 oz)  Min: 40.8 kg (90 lb)  Max: 41 kg (90 lb 6.2 oz)  I/O last 3 completed shifts:  In: 632.8 [P.O.:120; IV Piggyback:512.8]  Out: 0     Physical Examination:  Physical Exam  HENT:      Head: Normocephalic.      Nose: No rhinorrhea.   Eyes:      General: No scleral icterus.     Extraocular Movements: Extraocular movements intact.      Pupils: Pupils are equal, round, and reactive to light.   Cardiovascular:      Rate and Rhythm: Normal rate and regular rhythm.      Pulses: Normal pulses.      Heart sounds: Normal heart sounds.   Pulmonary:      Effort: Respiratory distress present.      Breath sounds: Rhonchi present.      Comments: Decreased breath sounds.   Chest:      Chest wall: No tenderness.   Abdominal:      General: Abdomen is flat. Bowel sounds are normal.      Palpations: Abdomen is soft.      Tenderness: There is no abdominal tenderness. There is no guarding or rebound.   Musculoskeletal:         General: No swelling, tenderness or signs of injury.      Right lower leg: No edema.      Left lower leg: No edema.   Skin:     Coloration: Skin is not jaundiced.   Neurological:      General: No focal deficit present.      Mental Status: She is alert and oriented to person, place, and time.      Motor: No weakness.   Psychiatric:         Mood and Affect: Mood normal.         Behavior: Behavior normal.    "      Thought Content: Thought content normal.            Laboratory:  Trended Lab Data:  Recent Labs     10/12/23  1232 10/12/23  1248 10/12/23  1552 10/13/23  0341   WBC 16.24*  --   --  7.80   HGB 11.9*  --   --  9.7*   HCT 37.1 35.8*  --  30.6*     --   --  159     --   --  127*   K 4.6  --   --  5.4*     --   --  96   CO2 23  --   --  17*   BUN 36*  --   --  55*   CREATININE 4.3*  --   --  5.3*   GLU 98  --   --  132*   CALCIUM 9.1  --   --  8.0*   ALBUMIN  --   --   --  2.7*   MG 2.2  --  2.1  --    PHOS  --   --   --  4.2       Cardiac:   Recent Labs   Lab 10/08/23  0039 10/08/23  0210 10/12/23  1232 10/12/23  1836   TROPONINI 0.098* 0.099* 0.172* 0.134*   *  --  846*  --        Urinalysis:   Lab Results   Component Value Date    LABURIN ESCHERICHIA COLI  >100,000 cfu/ml   (A) 11/08/2022    LABURIN KLEBSIELLA PNEUMONIAE  > 100,000 cfu/ml   (A) 11/08/2022    COLORU Yellow 11/04/2022    SPECGRAV 1.025 11/04/2022    NITRITE Negative 11/04/2022    KETONESU Negative 11/04/2022    UROBILINOGEN 2.0-3.0 (A) 11/04/2022       Microbiology:  Microbiology Results (last 7 days)       Procedure Component Value Units Date/Time    Blood culture (site 1) [[id number]] Collected: 10/12/23 1552    Order Status: Completed Specimen: Blood from Antecubital, Right Hand Updated: 10/13/23 0545     Blood Culture, Routine No Growth to date    Narrative:      Site # 1, aerobic and anaerobic    Blood culture (site 2) [[id number]] Collected: 10/12/23 1552    Order Status: Completed Specimen: Blood Updated: 10/13/23 0545     Blood Culture, Routine No Growth to date    Narrative:      Site # 2, aerobic only    Culture, Respiratory with Gram Stain [[id number]] Collected: 10/12/23 2013    Order Status: Completed Specimen: Respiratory from Sputum, Expectorated Updated: 10/13/23 0357     Gram Stain (Respiratory) <10 epithelial cells per low power field.     Gram Stain (Respiratory) No WBC's     Gram Stain (Respiratory)  Moderate Gram positive cocci    Respiratory Infection Panel (PCR), Nasopharyngeal [[id number]] Collected: 10/12/23 1804    Order Status: Completed Specimen: Nasopharyngeal Swab Updated: 10/13/23 0208     Respiratory Infection Panel Source NP Swab     Adenovirus Not Detected     Coronavirus 229E, Common Cold Virus Not Detected     Coronavirus HKU1, Common Cold Virus Not Detected     Coronavirus NL63, Common Cold Virus Not Detected     Coronavirus OC43, Common Cold Virus Not Detected     Comment: The Coronavirus strains detected in this test cause the common cold.  These strains are not the COVID-19 (novel Coronavirus)strain   associated with the respiratory disease outbreak.          SARS-CoV2 (COVID-19) Qualitative PCR Not Detected     Human Metapneumovirus Not Detected     Human Rhinovirus/Enterovirus Not Detected     Influenza A (subtypes H1, H1-2009,H3) Not Detected     Influenza B Not Detected     Parainfluenza Virus 1 Not Detected     Parainfluenza Virus 2 Not Detected     Parainfluenza Virus 3 Not Detected     Parainfluenza Virus 4 Not Detected     Respiratory Syncytial Virus Not Detected     Bordetella Parapertussis (EX1933) Not Detected     Bordetella pertussis (ptxP) Not Detected     Chlamydia pneumoniae Not Detected     Mycoplasma pneumoniae Not Detected    Narrative:      For all other respiratory sources, order [id number] -  Respiratory Viral Panel by PCR    Influenza A & B by Molecular [[id number]] Collected: 10/12/23 2005    Order Status: Completed Specimen: Nasopharyngeal Swab Updated: 10/12/23 2033     Influenza A, Molecular Negative     Influenza B, Molecular Negative     Flu A & B Source Nasal swab            Radiology:  I have personally reviewed the above labs and imaging.    Current Medications:     Infusions:       Scheduled:   sodium chloride 0.9%   Intravenous Once    albuterol-ipratropium  3 mL Nebulization Q4H    azithromycin  500 mg Intravenous Q24H    busPIRone  10 mg Oral Once per day on  Mon Wed Fri    cefTRIAXone (ROCEPHIN) IVPB  2 g Intravenous Q24H    epoetin hemant-epbx  10,000 Units Subcutaneous Every Mon, Wed, Fri    fluticasone furoate-vilanteroL  1 puff Inhalation Daily    [START ON 10/14/2023] furosemide  80 mg Oral Every Tues, Thurs, Sat    heparin (porcine)  5,000 Units Subcutaneous Q12H    megestroL  20 mg Oral BID    midodrine  15 mg Oral TID WM    mirtazapine  7.5 mg Oral QHS    mupirocin   Nasal BID    predniSONE  40 mg Oral Daily    sevelamer carbonate  800 mg Oral TID WM        PRN:  sodium chloride 0.9%, sodium chloride 0.9%, sodium chloride 0.9%, acetaminophen, albuterol sulfate, albuterol sulfate, heparin (porcine), HYDROcodone-acetaminophen, LORazepam, melatonin, sodium chloride 0.9%, sodium chloride 0.9%, sodium chloride 0.9%, sodium chloride 0.9%, sodium chloride 3%    Assessment:     Katie Quevedo is a 78 y.o.female with  Patient Active Problem List    Diagnosis Date Noted    Hyperkalemia 09/30/2023    Community acquired pneumonia of left lower lobe of lung 09/29/2023    Goals of care, counseling/discussion 09/29/2023    Sepsis 09/29/2023    Benzodiazepine dependence, continuous 08/18/2023    Chronic obstructive pulmonary disease with acute exacerbation     Coagulation defect, unspecified 04/25/2023    Unspecified mood (affective) disorder 04/25/2023    Aortic atherosclerosis 12/15/2022    Physical deconditioning 12/10/2022    Hypokalemia 12/09/2022    Adjustment reaction with anxiety and depression 11/01/2022    Advance care planning 10/18/2022    severe stage 4 COPD 10/13/2022    Hypotension 10/13/2022    Influenza A virus present 10/11/2022    Clotted dialysis access 01/03/2022    Secondary hyperparathyroidism 03/30/2021    Orthopnea 11/10/2020    SOB (shortness of breath) 11/09/2020    Acute on chronic heart failure 11/09/2020    Pleural effusion 11/09/2020    Medication management 05/28/2020    Dialysis AV fistula malfunction, sequela 03/09/2020    Diarrhea 02/28/2020     Palliative care encounter 02/26/2020    ESRD (end stage renal disease) on dialysis 02/19/2020    Diastolic dysfunction, left ventricle 02/04/2020    Clotted renal dialysis arteriovenous graft 02/03/2020    Nausea 01/14/2020    Pulmonary cachexia due to COPD 12/18/2019    Chronic diastolic heart failure 10/30/2019    Lipoma of torso 10/10/2019    Chronic respiratory failure with hypoxia, on home O2 therapy 07/18/2019    Shortness of breath 07/17/2019    Pericardial effusion 07/05/2019    Pleural effusion, left     Acute on chronic respiratory failure with hypoxia 03/05/2018    History of colon polyps 02/06/2018    Atrophy of left kidney 01/25/2018    Kidney cysts 01/25/2018    Iron deficiency anemia 01/22/2018    Essential hypertension 01/22/2018    Osteoporosis 09/21/2015    Osteoarthritis, hip, bilateral 10/27/2014    Lumbar radiculopathy, BLE 10/27/2014    Cervical radiculopathy, BUE 10/27/2014    Biceps tendonitis 01/06/2014    Rotator cuff tear, right 10/18/2013    Nuclear sclerosis - Both Eyes 08/08/2013    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 Alert and oriented x4.     CVS  #HTN: Antihypertensives held in setting of hypotension. Avoid over diuresing.     #CHF(HFpEF): fluid restriction, strict I's/o's. Low sodium diet. TTE pending.   - . EKG negative for acute ischemic changes.   -furosemide BID, avoid in setting of hypotension.     CHF:   PULM  #COPD exacerbation vs #volume overload 2/2 HD intolerance  Chest xray : pleural effusions and bilateral basilar subsegmental atelectasis.  Developing left lower lung zone consolidation not  excluded  CTA: negative for PE. Significant for small area of consolidation and parapneumonic effusion in the left lower lobe.  Correlate for area of chronic atelectasis and effusion versus pneumonia  - Will continue CAP coverage due to increased risk of infection 2/2 COPD. Blood cultures and respiratory cx pending. However, volume overload thought to be likely etiology of reoccurring dyspnea presentation.   -Continue duonebs Q4H. Prednisone 40 mg QID.       GI  No acute issues. Continue cardiac diet.     RENAL   #ESRD on HD mwf. Plans for dialysis today. Midodrine increased to 15 BID. Lasix to be held on days of dialysis, per nephrology. Concerns for HD toleration, as patient becomes hypotensive and tachycardic with each session limiting fluid removal. Which Is likely etiology for frequent presentation for dyspnea. Minimal UOP with lasix.   - continue MWF HD. Epo injection as needed with HD.  -QID renal function panel  - avoid renal toxic drugs, renally dose meds   - Sevelamer TID.   - Closely monitor electrolytes k, mg, phosh.     ENDOCRINE  Accu-chex. Goal 140-180 glucose.    HEME/ONC  #Macrocytic anemia  Folate and B-12 within normal range.   CTM.Transfuse <7.   VTE prophylaxis:     ID  See PULM.     PSYCH   #Anxiety. Lorazepam PRN. Continue buspirone as scheduled.     Thank you for allowing us to participate in the care of this patient, we will continue to follow. Please let us know if there are questions or concerns.      Emily Beltran MD  [facility] Fellow

## 2023-10-13 NOTE — NURSING
Consulted for midline. zina FRANCISCO gave clearance to place midline. Pt. Has LUE AVG and large RUE infiltration from previous IV. Nursing opted for PIV. RUE PIV placed proximal to RUE infiltration. Handoff to will shaw.

## 2023-10-13 NOTE — PROGRESS NOTES
[facility]                   Pharmacy  Pharmacy Vancomycin/AG Sign-off    Therapy with vancomycin completed and/or consult discontinued by provider.  Pharmacy will sign off, please re-consult as needed. Thank you for allowing us to participate in this patient's care.     Jerardo Pretty, PharmD    [phone]

## 2023-10-14 LAB
ALBUMIN SERPL BCP-MCNC: 3.8 G/DL (ref 3.5–5.2)
ANION GAP SERPL CALC-SCNC: 16 MMOL/L (ref 8–16)
BASOPHILS # BLD AUTO: ABNORMAL K/UL (ref 0–0.2)
BASOPHILS NFR BLD: 0 % (ref 0–1.9)
BUN SERPL-MCNC: 17 MG/DL (ref 8–23)
CALCIUM SERPL-MCNC: 8.7 MG/DL (ref 8.7–10.5)
CHLORIDE SERPL-SCNC: 97 MMOL/L (ref 95–110)
CO2 SERPL-SCNC: 23 MMOL/L (ref 23–29)
CREAT SERPL-MCNC: 2.7 MG/DL (ref 0.5–1.4)
DIFFERENTIAL METHOD: ABNORMAL
EOSINOPHIL # BLD AUTO: ABNORMAL K/UL (ref 0–0.5)
EOSINOPHIL NFR BLD: 0 % (ref 0–8)
ERYTHROCYTE [DISTWIDTH] IN BLOOD BY AUTOMATED COUNT: 14.9 % (ref 11.5–14.5)
EST. GFR  (NO RACE VARIABLE): 17 ML/MIN/1.73 M^2
GLUCOSE SERPL-MCNC: 113 MG/DL (ref 70–110)
HCT VFR BLD AUTO: 29.6 % (ref 37–48.5)
HGB BLD-MCNC: 9.6 G/DL (ref 12–16)
IMM GRANULOCYTES # BLD AUTO: ABNORMAL K/UL (ref 0–0.04)
IMM GRANULOCYTES NFR BLD AUTO: ABNORMAL % (ref 0–0.5)
LYMPHOCYTES # BLD AUTO: ABNORMAL K/UL (ref 1–4.8)
LYMPHOCYTES NFR BLD: 10 % (ref 18–48)
MCH RBC QN AUTO: 32.3 PG (ref 27–31)
MCHC RBC AUTO-ENTMCNC: 32.4 G/DL (ref 32–36)
MCV RBC AUTO: 100 FL (ref 82–98)
METAMYELOCYTES NFR BLD MANUAL: 1 %
MONOCYTES # BLD AUTO: ABNORMAL K/UL (ref 0.3–1)
MONOCYTES NFR BLD: 4 % (ref 4–15)
NEUTROPHILS NFR BLD: 85 % (ref 38–73)
NRBC BLD-RTO: 0 /100 WBC
PHOSPHATE SERPL-MCNC: 3.2 MG/DL (ref 2.7–4.5)
PLATELET # BLD AUTO: 291 K/UL (ref 150–450)
PMV BLD AUTO: 9.8 FL (ref 9.2–12.9)
POTASSIUM SERPL-SCNC: 3.8 MMOL/L (ref 3.5–5.1)
PROCALCITONIN SERPL IA-MCNC: 2.87 NG/ML
RBC # BLD AUTO: 2.97 M/UL (ref 4–5.4)
SODIUM SERPL-SCNC: 136 MMOL/L (ref 136–145)
WBC # BLD AUTO: 26.97 K/UL (ref 3.9–12.7)

## 2023-10-14 PROCEDURE — 94640 AIRWAY INHALATION TREATMENT: CPT | Mod: HCNC

## 2023-10-14 PROCEDURE — 21400001 HC TELEMETRY ROOM: Mod: HCNC

## 2023-10-14 PROCEDURE — 25000003 PHARM REV CODE 250: Mod: HCNC

## 2023-10-14 PROCEDURE — 80069 RENAL FUNCTION PANEL: CPT | Mod: HCNC | Performed by: INTERNAL MEDICINE

## 2023-10-14 PROCEDURE — 80100014 HC HEMODIALYSIS 1:1: Mod: HCNC

## 2023-10-14 PROCEDURE — 25000003 PHARM REV CODE 250: Mod: HCNC | Performed by: INTERNAL MEDICINE

## 2023-10-14 PROCEDURE — 94761 N-INVAS EAR/PLS OXIMETRY MLT: CPT | Mod: HCNC

## 2023-10-14 PROCEDURE — 63600175 PHARM REV CODE 636 W HCPCS: Mod: HCNC | Performed by: INTERNAL MEDICINE

## 2023-10-14 PROCEDURE — 36415 COLL VENOUS BLD VENIPUNCTURE: CPT | Mod: HCNC | Performed by: INTERNAL MEDICINE

## 2023-10-14 PROCEDURE — 25000003 PHARM REV CODE 250: Mod: HCNC | Performed by: STUDENT IN AN ORGANIZED HEALTH CARE EDUCATION/TRAINING PROGRAM

## 2023-10-14 PROCEDURE — 20000000 HC ICU ROOM: Mod: HCNC

## 2023-10-14 PROCEDURE — 84145 PROCALCITONIN (PCT): CPT | Mod: HCNC | Performed by: INTERNAL MEDICINE

## 2023-10-14 PROCEDURE — P9047 ALBUMIN (HUMAN), 25%, 50ML: HCPCS | Mod: JZ,JG,HCNC | Performed by: INTERNAL MEDICINE

## 2023-10-14 PROCEDURE — 85027 COMPLETE CBC AUTOMATED: CPT | Mod: HCNC | Performed by: INTERNAL MEDICINE

## 2023-10-14 PROCEDURE — 25000242 PHARM REV CODE 250 ALT 637 W/ HCPCS: Mod: HCNC | Performed by: STUDENT IN AN ORGANIZED HEALTH CARE EDUCATION/TRAINING PROGRAM

## 2023-10-14 PROCEDURE — 99900035 HC TECH TIME PER 15 MIN (STAT): Mod: HCNC

## 2023-10-14 PROCEDURE — 94660 CPAP INITIATION&MGMT: CPT | Mod: HCNC

## 2023-10-14 PROCEDURE — 85007 BL SMEAR W/DIFF WBC COUNT: CPT | Mod: HCNC | Performed by: INTERNAL MEDICINE

## 2023-10-14 PROCEDURE — 27000221 HC OXYGEN, UP TO 24 HOURS: Mod: HCNC

## 2023-10-14 PROCEDURE — 63600175 PHARM REV CODE 636 W HCPCS: Mod: JZ,JG,HCNC | Performed by: INTERNAL MEDICINE

## 2023-10-14 RX ORDER — ALBUMIN HUMAN 250 G/1000ML
25 SOLUTION INTRAVENOUS ONCE
Status: COMPLETED | OUTPATIENT
Start: 2023-10-14 | End: 2023-10-14

## 2023-10-14 RX ORDER — NOREPINEPHRINE BITARTRATE/D5W 4MG/250ML
0-.2 PLASTIC BAG, INJECTION (ML) INTRAVENOUS CONTINUOUS
Status: DISCONTINUED | OUTPATIENT
Start: 2023-10-14 | End: 2023-10-15

## 2023-10-14 RX ORDER — SODIUM CHLORIDE 9 MG/ML
INJECTION, SOLUTION INTRAVENOUS ONCE
Status: DISCONTINUED | OUTPATIENT
Start: 2023-10-14 | End: 2023-10-16

## 2023-10-14 RX ORDER — SODIUM CHLORIDE 9 MG/ML
INJECTION, SOLUTION INTRAVENOUS
Status: DISCONTINUED | OUTPATIENT
Start: 2023-10-14 | End: 2023-10-16

## 2023-10-14 RX ORDER — ALBUMIN HUMAN 250 G/1000ML
25 SOLUTION INTRAVENOUS
Status: DISCONTINUED | OUTPATIENT
Start: 2023-10-14 | End: 2023-10-27 | Stop reason: HOSPADM

## 2023-10-14 RX ORDER — NOREPINEPHRINE BITARTRATE/D5W 4MG/250ML
PLASTIC BAG, INJECTION (ML) INTRAVENOUS
Status: COMPLETED
Start: 2023-10-14 | End: 2023-10-14

## 2023-10-14 RX ADMIN — IPRATROPIUM BROMIDE AND ALBUTEROL SULFATE 3 ML: 2.5; .5 SOLUTION RESPIRATORY (INHALATION) at 11:10

## 2023-10-14 RX ADMIN — FLUTICASONE FUROATE AND VILANTEROL TRIFENATATE 1 PUFF: 200; 25 POWDER RESPIRATORY (INHALATION) at 11:10

## 2023-10-14 RX ADMIN — IPRATROPIUM BROMIDE AND ALBUTEROL SULFATE 3 ML: 2.5; .5 SOLUTION RESPIRATORY (INHALATION) at 04:10

## 2023-10-14 RX ADMIN — HEPARIN SODIUM 5000 UNITS: 5000 INJECTION INTRAVENOUS; SUBCUTANEOUS at 08:10

## 2023-10-14 RX ADMIN — NOREPINEPHRINE BITARTRATE 0.02 MCG/KG/MIN: 4 INJECTION, SOLUTION INTRAVENOUS at 03:10

## 2023-10-14 RX ADMIN — IPRATROPIUM BROMIDE AND ALBUTEROL SULFATE 3 ML: 2.5; .5 SOLUTION RESPIRATORY (INHALATION) at 07:10

## 2023-10-14 RX ADMIN — MIDODRINE HYDROCHLORIDE 15 MG: 5 TABLET ORAL at 05:10

## 2023-10-14 RX ADMIN — PREDNISONE 40 MG: 20 TABLET ORAL at 09:10

## 2023-10-14 RX ADMIN — SEVELAMER CARBONATE 800 MG: 800 TABLET, FILM COATED ORAL at 05:10

## 2023-10-14 RX ADMIN — Medication 5 DROP: at 09:10

## 2023-10-14 RX ADMIN — MEGESTROL ACETATE 20 MG: 20 TABLET ORAL at 11:10

## 2023-10-14 RX ADMIN — ACETAMINOPHEN 325MG 650 MG: 325 TABLET ORAL at 12:10

## 2023-10-14 RX ADMIN — CEFTRIAXONE SODIUM 2 G: 2 INJECTION, POWDER, FOR SOLUTION INTRAMUSCULAR; INTRAVENOUS at 03:10

## 2023-10-14 RX ADMIN — MIDODRINE HYDROCHLORIDE 15 MG: 5 TABLET ORAL at 08:10

## 2023-10-14 RX ADMIN — HYDROCODONE BITARTRATE AND ACETAMINOPHEN 1 TABLET: 10; 325 TABLET ORAL at 08:10

## 2023-10-14 RX ADMIN — ALBUMIN (HUMAN) 25 G: 12.5 SOLUTION INTRAVENOUS at 02:10

## 2023-10-14 RX ADMIN — AZITHROMYCIN MONOHYDRATE 500 MG: 500 INJECTION, POWDER, LYOPHILIZED, FOR SOLUTION INTRAVENOUS at 03:10

## 2023-10-14 RX ADMIN — IPRATROPIUM BROMIDE AND ALBUTEROL SULFATE 3 ML: 2.5; .5 SOLUTION RESPIRATORY (INHALATION) at 03:10

## 2023-10-14 RX ADMIN — HYDROCODONE BITARTRATE AND ACETAMINOPHEN 1 TABLET: 10; 325 TABLET ORAL at 10:10

## 2023-10-14 RX ADMIN — HEPARIN SODIUM 5000 UNITS: 5000 INJECTION INTRAVENOUS; SUBCUTANEOUS at 11:10

## 2023-10-14 RX ADMIN — FUROSEMIDE 80 MG: 40 TABLET ORAL at 09:10

## 2023-10-14 RX ADMIN — MUPIROCIN: 20 OINTMENT TOPICAL at 09:10

## 2023-10-14 RX ADMIN — NOREPINEPHRINE BITARTRATE 0.02 MCG/KG/MIN: 4 INJECTION, SOLUTION INTRAVENOUS at 05:10

## 2023-10-14 RX ADMIN — MIDODRINE HYDROCHLORIDE 15 MG: 5 TABLET ORAL at 11:10

## 2023-10-14 RX ADMIN — HYDROCODONE BITARTRATE AND ACETAMINOPHEN 1 TABLET: 10; 325 TABLET ORAL at 05:10

## 2023-10-14 RX ADMIN — MUPIROCIN: 20 OINTMENT TOPICAL at 08:10

## 2023-10-14 RX ADMIN — MEGESTROL ACETATE 20 MG: 20 TABLET ORAL at 08:10

## 2023-10-14 RX ADMIN — NOREPINEPHRINE BITARTRATE 0.02 MCG/KG/MIN: 4 INJECTION, SOLUTION INTRAVENOUS at 11:10

## 2023-10-14 RX ADMIN — ACETAMINOPHEN 325MG 650 MG: 325 TABLET ORAL at 08:10

## 2023-10-14 RX ADMIN — SODIUM CHLORIDE 250 ML: 9 INJECTION, SOLUTION INTRAVENOUS at 03:10

## 2023-10-14 NOTE — EICU
EICU FOLLOWUP NOTE:    Called for:  Hypotension    Medical records including notes were reviewed.    DISCUSSED with bedside nurse. Yes    ASSESSMENT AND PLAN:    Patient has is disease and on dialysis.  Patient had dialysis today and 3 L of fluid was taken off.  Patient does have baseline hypotension and midodrine dose was increased from 10-15 3 times a day today.  Now the blood pressure is 80/40 with a map of 60.  Patient is asymptomatic and comfortably sleeping this time.  I will give albumin and normal saline 250 cc bolus.  I I will check procalcitonin level with the morning labs.  I will continue to monitor her hemodynamics closely.    Thank You for allowing [facility] to participate in the care of the patient. Please call as needed    Caleb Herr MD  [facility]  [phone]       Addendum:  Blood pressure continued to be on the lower side and now map in mid 50s.  I will start Levophed for now.  I will continue to monitor her hemodynamics closely.  Patient is on ceftriaxone azithromycin and vancomycin already.  Patient had leukocytosis at the time of presentation but no WBC count is within normal range.  Patient does not seem to have any fever either.

## 2023-10-14 NOTE — NURSING
Notified by dialysis RN that patient was becoming very anxious, tachypnic, and was having desaturations to the 70s while on bipap. RN went down to dialysis to see patient. Patient was tachypnic and anxious. O2 reading on the monitor did not seem to have a correct reading. O2 reading checked by another device per RT reading 98%. Call placed to EFREN Gill and asked if PRN ativan could be given early due to patient being very anxious. Stated RN could administer PRN early. PRN given per dialysis RN. O2 sat probe replaced and reading was reading appropriately.

## 2023-10-14 NOTE — PROGRESS NOTES
[facility] Medicine  Progress Note    Patient Name: Katie Quevedo  MRN: [id number]  Patient Class: IP- Inpatient   Admission Date: 10/12/2023  Length of Stay: 2 days  Attending Physician: Sonu Martínez MD  Primary Care Provider: Kingston Verduzco MD        Subjective:     Principal Problem:<principal problem not specified>    Katie Quevedo is a 78 y.o.  female who  has a past medical history of Acute congestive heart failure (02/10/2020), Anemia, Bilateral renal cysts, Cataract, Chronic LBP (7/26/2012), Chronic pain, CKD (chronic kidney disease), stage IV, Colon polyp (2013), COPD (chronic obstructive pulmonary disease), Dehydration, Encounter for blood transfusion, HTN (hypertension), Lumbar spondylosis, Melanoma, Metabolic bone disease, Migraines, neuralgic, Osteoporosis, Primary osteoarthritis of both knees, Pulmonary embolism with infarction, Seizures (1972), Subdeltoid bursitis, L>R. (9/27/2012), Ulcer, and Vitamin D deficiency disease.. The patient presented to [facility] on 10/12/2023 with a primary complaint of Shortness of Breath (Pt presents to the ED for sob. Pt presents on a neb mask at 8lpm .  Pt has been hospitalized recently for pneumonia)     The patient was in their usual state of health until 2 days ago when she was discharge from [facility] for respiratory failure 2/2 pneumonia, COPD. She has had 2 other hospital admissions in the last 2 weeks. She was barely able to finish her HD session yesterday. Spoke with Dr. Montemayor, she has been struggling to complete HD sessions over the last 6 weeks and has becoming more and more frail and tachycardic on the machine and there is some concern she will not be able to continue with dialysis. She asked to come to the ER today for worsening shortness of breath. She does not have BiPAP at home. Daughter has not observed any issues with swallowing or episodes concerning for aspiration. She is on chronic opiates,  unclear if patient has been more sedated than usual at home.    Interval History: no acute event overnight. Continued Levophed. Hypotensive, appear septic. Leukocytosis and on IV Abx, Awaiting cx. On 3 L O2 NC.     Review of Systems      Constitutional:  Positive for malaise/fatigue.   Respiratory:  Positive for shortness of breath.    Cardiovascular:  Negative for chest pain and leg swelling.   Genitourinary:  Negative for dysuria, frequency and urgency.   Musculoskeletal:  Positive for back pain.   Neurological:  Positive for weakness. Negative for dizziness, tingling and headaches.   Psychiatric/Behavioral:  Negative for depression and memory loss.      Objective:     Vital Signs (Most Recent):  Temp: 97.9 °F (36.6 °C) (10/14/23 0715)  Pulse: 103 (10/14/23 0800)  Resp: (!) 28 (10/14/23 0804)  BP: (!) 95/45 (10/14/23 0800)  SpO2: 100 % (10/14/23 0800) Vital Signs (24h Range):  Temp:  [97.4 °F (36.3 °C)-97.9 °F (36.6 °C)] 97.9 °F (36.6 °C)  Pulse:  [] 103  Resp:  [16-38] 28  SpO2:  [93 %-100 %] 100 %  BP: ()/(33-96) 95/45     Weight: 40.8 kg (89 lb 15.2 oz)  Body mass index is 16.45 kg/m².    Intake/Output Summary (Last 24 hours) at 10/14/2023 0812  Last data filed at 10/14/2023 0600  Gross per 24 hour   Intake 1014.8 ml   Output 3500 ml   Net -2485.2 ml      Physical Exam    Constitutional:       General: She is not in acute distress.     Appearance: She is ill-appearing.   HENT:      Head: Normocephalic and atraumatic.   Cardiovascular:      Rate and Rhythm: Regular rhythm. Tachycardia present.      Heart sounds: No murmur heard.  Pulmonary:      Breath sounds: Examination of the left-lower field reveals decreased breath sounds. Decreased breath sounds present. No rales.   Abdominal:      General: Abdomen is flat. Bowel sounds are normal.      Palpations: Abdomen is soft.   Musculoskeletal:      Right lower leg: No edema.      Left lower leg: No edema.   Neurological:      General: No focal deficit  present.      Motor: Weakness present.          Significant Labs: All pertinent labs within the past 24 hours have been reviewed.  CBC:   Recent Labs   Lab 10/12/23  1232 10/12/23  1248 10/13/23  0341 10/14/23  0523   WBC 16.24*  --  7.80 26.97*   HGB 11.9*  --  9.7* 9.6*   HCT 37.1 35.8* 30.6* 29.6*     --  159 291     CMP:   Recent Labs   Lab 10/12/23  1232 10/13/23  0341 10/14/23  0523    127* 136   K 4.6 5.4* 3.8    96 97   CO2 23 17* 23   GLU 98 132* 113*   BUN 36* 55* 17   CREATININE 4.3* 5.3* 2.7*   CALCIUM 9.1 8.0* 8.7   ALBUMIN  --  2.7* 3.8   ANIONGAP 16 14 16       Significant Imaging: I have reviewed all pertinent imaging results/findings within the past 24 hours.  I have reviewed and interpreted all pertinent imaging results/findings within the past 24 hours.    Assessment/Plan:      There are no hospital problems to display for this patient.    VTE Risk Mitigation (From admission, onward)           Ordered     heparin (porcine) injection 5,000 Units  Every 12 hours         10/12/23 1406     IP VTE HIGH RISK PATIENT  Once         10/12/23 1406     Place sequential compression device  Until discontinued         10/12/23 1406     Place sequential compression device  Until discontinued         10/12/23 1406     heparin (porcine) injection 4,100 Units  As needed (PRN)         10/12/23 1406                Assessment and plan:    Acute Hypoxic and Hypercapnic Respiratory Failure, Volume Overload  Multiple admissions for PNA and SOB   CXR Grossly stable appearing cardiopulmonary findings noting pleural effusions and bilateral basilar subsegmental atelectasis.  Developing left lower lung zone consolidation not excluded.    Started on BiPAP and weaned to 1 L NC  Procal 1.44, elevated from prior  Stop vancomycin  Pulm and Nephro feel this is more due to volume overload due to issues receiving dialysis and fluid removal, L lung findings are less likely recurrent pneumonia  Multiple thoracenteses  in the past  Treat with ceftriaxone and azithromycin for now  Resp cx, BCx  Step down to floor today  Request home BiPAP/Trelegy     Multiple Recent Hospital Admissions  Discussed recently decline in health with patient's daughter  Patient is a DNR  Brought up Palliative consult to daughter, she will consider it     Elevated Troponin  0.17-->repeat ordered  EKG without BERKLEY, changes from prior noted     ESRD on HD  Consult Nephrology  Spoke with Dr. Montemayor, she has been struggling to complete HD sessions over the last 6 weeks and has becoming more and more frail and tachycardic on the machine and there is some concern she will not be able to continue with dialysis  Also said it was okay to place a midline  HD today     Chronic Opiate Use  Will continue     DVT PPx: Heparin      Critical care time spent on the evaluation and treatment of severe organ dysfunction, review of pertinent labs and imaging studies, discussions with consulting providers and discussions with patient/family: 30 minutes.     Sonu Martínez MD  Department of Hospital Medicine   [facility] - Intensive Care

## 2023-10-14 NOTE — PROGRESS NOTES
Nephrology Progress Note       Consult Requested By: Sonu Martínez MD  Reason for Consult: ESRD     SUBJECTIVE:            ?    Review of Systems   Constitutional:  Negative for chills, fever and malaise/fatigue.   HENT:  Negative for congestion and sore throat.    Eyes:  Negative for blurred vision, double vision and photophobia.   Respiratory:  Positive for shortness of breath. Negative for cough.    Cardiovascular:  Negative for chest pain, palpitations and leg swelling.   Gastrointestinal:  Negative for abdominal pain, diarrhea, nausea and vomiting.   Genitourinary:  Negative for dysuria and urgency.   Musculoskeletal:  Negative for joint pain and myalgias.   Skin:  Negative for itching and rash.   Neurological:  Negative for dizziness, sensory change, weakness and headaches.   Endo/Heme/Allergies:  Negative for polydipsia. Does not bruise/bleed easily.   Psychiatric/Behavioral:  Negative for depression.        Past Medical History:   Diagnosis Date    Acute congestive heart failure 02/10/2020    Anemia     Bilateral renal cysts     Cataract     Chronic LBP 7/26/2012    Chronic pain     CKD (chronic kidney disease), stage IV     Colon polyp 2013    COPD (chronic obstructive pulmonary disease)     Dehydration     Encounter for blood transfusion     HTN (hypertension)     Lumbar spondylosis     Melanoma     of the lip    Metabolic bone disease     Migraines, neuralgic     Osteoporosis     Primary osteoarthritis of both knees     s/p Rt TKA    Pulmonary embolism with infarction     Seizures 1972    x1 only    Subdeltoid bursitis, L>R. 9/27/2012    Ulcer     Vitamin D deficiency disease           OBJECTIVE:     Vital Signs (Most Recent)  Vitals:    10/14/23 0845 10/14/23 0900 10/14/23 0915 10/14/23 0921   BP: (!) 101/42 (!) 102/48 (!) 117/52 (!) 117/52   Pulse: 107 108 109 103   Resp: (!) 26 (!) 29 (!) 23 (!) 27   Temp:       TempSrc:       SpO2: 100% 100% 99% 100%   Weight:       Height:                      Medications:          Physical Exam  Vitals and nursing note reviewed.   Constitutional:       General: She is not in acute distress.     Appearance: She is ill-appearing. She is not diaphoretic.   HENT:      Head: Normocephalic and atraumatic.      Mouth/Throat:      Pharynx: No oropharyngeal exudate.   Eyes:      General: No scleral icterus.     Conjunctiva/sclera: Conjunctivae normal.      Pupils: Pupils are equal, round, and reactive to light.   Cardiovascular:      Rate and Rhythm: Normal rate and regular rhythm.      Heart sounds: Normal heart sounds. No murmur heard.  Pulmonary:      Effort: No respiratory distress.      Breath sounds: Rales present. No wheezing.   Abdominal:      General: Bowel sounds are normal. There is no distension.      Palpations: Abdomen is soft.      Tenderness: There is no abdominal tenderness.   Musculoskeletal:         General: Normal range of motion.      Cervical back: Normal range of motion and neck supple.      Right lower leg: No edema.      Left lower leg: No edema.      Comments:    CVCRIJ    Skin:     General: Skin is warm and dry.      Findings: No erythema.   Neurological:      Mental Status: She is alert and oriented to person, place, and time.      Cranial Nerves: No cranial nerve deficit.   Psychiatric:         Mood and Affect: Affect normal.         Cognition and Memory: Memory normal.         Judgment: Judgment normal.         Laboratory:  Recent Labs   Lab 10/12/23  1232 10/12/23  1248 10/13/23  0341 10/14/23  0523   WBC 16.24*  --  7.80 26.97*   HGB 11.9*  --  9.7* 9.6*   HCT 37.1 35.8* 30.6* 29.6*     --  159 291   MONO 8.3  1.4*  --  2.7*  0.2* 4.0  CANCELED   EOSINOPHIL 3.0  --  0.0 0.0       Recent Labs   Lab 10/08/23  0405 10/12/23  1232 10/13/23  0341 10/14/23  0523    139 127* 136   K 5.7* 4.6 5.4* 3.8    100 96 97   CO2 15* 23 17* 23   BUN 67* 36* 55* 17   CREATININE 5.0* 4.3* 5.3* 2.7*   CALCIUM 8.3* 9.1 8.0* 8.7    PHOS 5.2*  --  4.2 3.2         Diagnostic Results:  X-Ray: Reviewed  US: Reviewed  Echo: Reviewed  ASSESSMENT/PLAN:     1. ESRD  - HD MWF [facility] With Dr Aura Diggs   -- Wednesday at her Unit  with minimal pull  due to Hypotension and tachycardia anxiety   -- Now here again SOB Hyperkalemia volume overload needs HD with UF did HD with UF yesterday 3L still bilateral cackles on exam   Give midodrine  before HD stop lasix   Can take lasix on TTSS as its contributing to her hypotension   Has WBC and low BP VIJAY always low received steroids on admission   Low dose levofed and midodrine - hold her benzo's before HD today   Discussed with patient     -- Keep MWF after   -- Daily Renal Function Panel  -- Avoid Hypotension.  -- Renally dose all meds    2. HTN (I10) -  Controlled   3. Anemia of chronic kidney disease treated with BILL     EPogen  with   HD as needed    Recent Labs   Lab 10/12/23  1232 10/12/23  1248 10/13/23  0341 10/14/23  0523   HGB 11.9*  --  9.7* 9.6*   HCT 37.1 35.8* 30.6* 29.6*     --  159 291         Iron   Lab Results   Component Value Date    IRON 13 (L) 02/12/2020    TIBC 462 (H) 02/12/2020    FERRITIN 148 07/17/2019       4. MBD (E88.9 M90.80) -  Recent Labs   Lab 10/14/23  0523   CALCIUM 8.7   PHOS 3.2       Recent Labs   Lab 10/08/23  0405 10/12/23  1232 10/12/23  1552   MG 2.3 2.2 2.1         Lab Results   Component Value Date    .0 (H) 12/28/2018    CALCIUM 8.7 10/14/2023    PHOS 3.2 10/14/2023     Lab Results   Component Value Date    MKLQWADW48SM 26 (L) 02/12/2020     Sevelamer   Lab Results   Component Value Date    CO2 23 10/14/2023       5. Nutrition/Hypoalbuminemia   Recent Labs   Lab 10/13/23  0341 10/14/23  0523   ALBUMIN 2.7* 3.8       Nepro with meals TID. Renal vitamins daily    Total critical care time 35 minutes: included management of organ failures related to critical illness, titration of continuous infusions, review of pertinent labs and imaging studies,  discussion with primary team and family.    Thank you for the consult, will follow  With any question please call [phone]  Ramo Da Silva MD    [facility]  EDGARD Bustillos MD, NEIL SAMS MD,   MD CURTIS Benoit MD E. V. Harmon, NP    [address]  [place], [address] [phone]

## 2023-10-14 NOTE — NURSING
Patient received back from dialysis. Pt. Quickly hooked back up to continuous monitoring. VSS. Patient requested to be taken off of bipap. Patient placed on 3 L NC. Tolerating well. PM medications given as well as medications missed during dialysis. Will continue with POC.

## 2023-10-14 NOTE — PLAN OF CARE
Problem: Adult Inpatient Plan of Care  Goal: Plan of Care Review  Outcome: Ongoing, Progressing     Patient remains on levo. Titrated to 0.1 MAP > 65. No other acute events. Patient tolerated 3 L NC throughout the night. Afebrile. Safety, hygiene, and comfort needs addressed.

## 2023-10-14 NOTE — PROGRESS NOTES
10/13/23 2125 10/13/23 2127   Vital Signs   Pulse (!) 142 (!) 124   /71 132/61   Pre-Hemodialysis Assessment   Treatment Status Completed  --    During Hemodialysis Assessment   Blood Flow Rate (mL/min) 300 mL/min  --    Dialysate Flow Rate (mL/min) 600 ml/min  --    Ultrafiltration Rate (mL/Hr) 880 mL/Hr  --    Arteriovenous Lines Secure Yes  --    Arterial Pressure (mmHg) -173 mmHg  --    Venous Pressure (mmHg) 120  --    Blood Volume Processed (Liters) 0 L  --    UF Removed (mL) 3500 mL  --    TMP 7  --    Venous Line in Air Detector Yes  --    Intake (mL) 0 mL  --    Transducer Dry Yes  --    Access Visible Yes  --     notified of access issue? N/A  --    Heparin given? N/A  --    Intra-Hemodialysis Comments tx complete  --    Post-Hemodialysis Assessment   Rinseback Volume (mL) 250 mL  --    Blood Volume Processed (Liters) 71.7 L  --    Dialyzer Clearance Lightly streaked  --    Duration of Treatment 240 minutes  --    Additional Fluid Intake (mL) 500 mL  --    Total UF (mL) 3500 mL  --    Net Fluid Removal 3000  --    Patient Response to Treatment Pt tolerated tx, had a moment of anxiety, PRN provided  --    Post-Hemodialysis Comments CVC flushed, heparin placed, CVC capped and wrapped.  --

## 2023-10-14 NOTE — PROGRESS NOTES
[facility] Progress Note      Assessment/Plan:     Katie Quevedo is a 78 y.o.female with       Acute Hypoxic and Hypercapnic Respiratory Failure, Volume Overload  Multiple admissions for PNA and SOB   VS 97.7F 129 18 140/82 99%       Recent Labs     10/12/23  1248   PH 7.290*   PCO2 59.1*   PO2 17.7*   HCO3 28.4*   POCSATURATED 20.8*      CXR Grossly stable appearing cardiopulmonary findings noting pleural effusions and bilateral basilar subsegmental atelectasis.  Developing left lower lung zone consolidation not excluded.    Started on BiPAP and weaned to 1 L NC  Procal 1.44, elevated from prior  Stop vancomycin  Pulm and Nephro feel this is more due to volume overload due to issues receiving dialysis and fluid removal, L lung findings are less likely recurrent pneumonia  Multiple thoracenteses in the past  Treat with ceftriaxone and azithromycin for now  Resp cx, BCx  Step down to floor today  Request home BiPAP/Trelegy     Multiple Recent Hospital Admissions  Discussed recently decline in health with patient's daughter  Patient is a DNR  Brought up Palliative consult to daughter, she will consider it     Elevated Troponin  0.17-->repeat ordered  EKG without BERKLEY, changes from prior noted     ESRD on HD  Consult Nephrology  Spoke with Dr. Montemayor, she has been struggling to complete HD sessions over the last 6 weeks and has becoming more and more frail and tachycardic on the machine and there is some concern she will not be able to continue with dialysis  Also said it was okay to place a midline  HD today     Chronic Opiate Use  Will continue     DVT PPx: Heparin      Subjective:      Katie Quevedo is a 78 y.o.  female who is being followed by [facility] for  volume overload    This morning eating breakfast with some increased work of breathing and complaining of shortness of breath.    Review of Systems   Constitutional:  Positive for malaise/fatigue.   Respiratory:  Positive for  "shortness of breath.    Cardiovascular:  Negative for chest pain and leg swelling.   Genitourinary:  Negative for dysuria, frequency and urgency.   Musculoskeletal:  Positive for back pain.   Neurological:  Positive for weakness. Negative for dizziness, tingling and headaches.   Psychiatric/Behavioral:  Negative for depression and memory loss.         Objective:   Last 24 Hour Vital Signs:  BP  Min: 77/47  Max: 157/61  Temp  Av.8 °F (36.6 °C)  Min: 97.4 °F (36.3 °C)  Max: 98.4 °F (36.9 °C)  Pulse  Av.1  Min: 92  Max: 118  Resp  Av.4  Min: 17  Max: 38  SpO2  Av.9 %  Min: 93 %  Max: 100 %  Height  Av' 2" (157.5 cm)  Min: 5' 2" (157.5 cm)  Max: 5' 2" (157.5 cm)  Weight  Av.8 kg (89 lb 15.6 oz)  Min: 40.8 kg (89 lb 15.2 oz)  Max: 40.8 kg (90 lb)  I/O last 3 completed shifts:  In: 917.9 [P.O.:120; I.V.:35.8; IV Piggyback:762.1]  Out: 0     Physical Exam  Constitutional:       General: She is not in acute distress.     Appearance: She is ill-appearing.   HENT:      Head: Normocephalic and atraumatic.   Cardiovascular:      Rate and Rhythm: Regular rhythm. Tachycardia present.      Heart sounds: No murmur heard.  Pulmonary:      Breath sounds: Examination of the left-lower field reveals decreased breath sounds. Decreased breath sounds present. No rales.   Abdominal:      General: Abdomen is flat. Bowel sounds are normal.      Palpations: Abdomen is soft.   Musculoskeletal:      Right lower leg: No edema.      Left lower leg: No edema.   Neurological:      General: No focal deficit present.      Motor: Weakness present.           Laboratory:  Laboratory Data Reviewed: yes  Pertinent Findings:        Microbiology Data Reviewed: yes  Pertinent Findings:      Other Results:  EKG (my interpretation): .    Radiology Data Reviewed: yes  Pertinent Findings:      Current Medications:     Infusions:       Scheduled:   sodium chloride 0.9%   Intravenous Once    albuterol-ipratropium  3 mL Nebulization " Q4H    azithromycin  500 mg Intravenous Q24H    busPIRone  10 mg Oral Once per day on Mon Wed Fri    cefTRIAXone (ROCEPHIN) IVPB  2 g Intravenous Q24H    epoetin hemant-epbx  10,000 Units Subcutaneous Every Mon, Wed, Fri    fluticasone furoate-vilanteroL  1 puff Inhalation Daily    [START ON 10/14/2023] furosemide  80 mg Oral Every Tues, Thurs, Sat    heparin (porcine)  5,000 Units Subcutaneous Q12H    megestroL  20 mg Oral BID    midodrine  15 mg Oral TID WM    mirtazapine  7.5 mg Oral QHS    mupirocin   Nasal BID    predniSONE  40 mg Oral Daily    sevelamer carbonate  800 mg Oral TID WM        PRN:  sodium chloride 0.9%, sodium chloride 0.9%, sodium chloride 0.9%, acetaminophen, albuterol sulfate, albuterol sulfate, heparin (porcine), HYDROcodone-acetaminophen, LORazepam, melatonin, sodium chloride 0.9%, sodium chloride 0.9%, sodium chloride 0.9%, sodium chloride 0.9%, sodium chloride 3%    Antibiotics and Day Number of Therapy:      Lines and Day Number of Therapy:      Franchesca Tillman MD  [facility]

## 2023-10-14 NOTE — PROGRESS NOTES
[facility] Progress Note    Subjective:      Seen with daughter at bedside today. Continues to feel subjectively OK but overnight, pressors had to be restarted given low MAPs and her WBC also went up. Otherwise, no fevers or increased oxygen requirements.      Objective:     Last 24 Hour Vital Signs:  BP  Min: 60/36  Max: 159/53  Temp  Av.6 °F (36.4 °C)  Min: 97.4 °F (36.3 °C)  Max: 97.9 °F (36.6 °C)  Pulse  Av.6  Min: 90  Max: 142  Resp  Av.3  Min: 16  Max: 33  SpO2  Av.3 %  Min: 94 %  Max: 100 %  I/O last 3 completed shifts:  In: 1680.5 [I.V.:178.5; Other:500; IV Piggyback:1002.1]  Out: 3500 [Other:3500]    Physical Examination:  Physical Exam  HENT:      Head: Normocephalic.      Nose: No rhinorrhea.   Eyes:      General: No scleral icterus.     Extraocular Movements: Extraocular movements intact.      Pupils: Pupils are equal, round, and reactive to light.   Cardiovascular:      Rate and Rhythm: Normal rate and regular rhythm.      Pulses: Normal pulses.      Heart sounds: Normal heart sounds.   Pulmonary:      Effort: Respiratory distress present.      Breath sounds: Rhonchi present.      Comments: Decreased breath sounds.   Chest:      Chest wall: No tenderness.   Abdominal:      General: Abdomen is flat. Bowel sounds are normal.      Palpations: Abdomen is soft.      Tenderness: There is no abdominal tenderness. There is no guarding or rebound.   Musculoskeletal:         General: No swelling, tenderness or signs of injury.      Right lower leg: No edema.      Left lower leg: No edema.   Skin:     Coloration: Skin is not jaundiced.   Neurological:      General: No focal deficit present.      Mental Status: She is alert and oriented to person, place, and time.      Motor: No weakness.   Psychiatric:         Mood and Affect: Mood normal.         Behavior: Behavior normal.         Thought Content: Thought content normal.            Laboratory:  Trended Lab  Data:  Recent Labs     10/12/23  1232 10/12/23  1248 10/12/23  1552 10/13/23  0341 10/14/23  0523   WBC 16.24*  --   --  7.80 26.97*   HGB 11.9*  --   --  9.7* 9.6*   HCT 37.1 35.8*  --  30.6* 29.6*     --   --  159 291     --   --  127* 136   K 4.6  --   --  5.4* 3.8     --   --  96 97   CO2 23  --   --  17* 23   BUN 36*  --   --  55* 17   CREATININE 4.3*  --   --  5.3* 2.7*   GLU 98  --   --  132* 113*   CALCIUM 9.1  --   --  8.0* 8.7   ALBUMIN  --   --   --  2.7* 3.8   MG 2.2  --  2.1  --   --    PHOS  --   --   --  4.2 3.2         Cardiac:   Recent Labs   Lab 10/08/23  0039 10/08/23  0210 10/12/23  1232 10/12/23  1836   TROPONINI 0.098* 0.099* 0.172* 0.134*   *  --  846*  --          Urinalysis:   Lab Results   Component Value Date    LABURIN ESCHERICHIA COLI  >100,000 cfu/ml   (A) 11/08/2022    LABURIN KLEBSIELLA PNEUMONIAE  > 100,000 cfu/ml   (A) 11/08/2022    COLORU Yellow 11/04/2022    SPECGRAV 1.025 11/04/2022    NITRITE Negative 11/04/2022    KETONESU Negative 11/04/2022    UROBILINOGEN 2.0-3.0 (A) 11/04/2022       Microbiology:  Microbiology Results (last 7 days)       Procedure Component Value Units Date/Time    Culture, Respiratory with Gram Stain [[id number]] Collected: 10/12/23 2013    Order Status: Completed Specimen: Respiratory from Sputum, Expectorated Updated: 10/14/23 1039     Respiratory Culture Normal respiratory milena     Gram Stain (Respiratory) <10 epithelial cells per low power field.     Gram Stain (Respiratory) No WBC's     Gram Stain (Respiratory) Moderate Gram positive cocci    Blood culture (site 1) [[id number]] Collected: 10/12/23 1552    Order Status: Completed Specimen: Blood from Antecubital, Right Hand Updated: 10/14/23 0612     Blood Culture, Routine No Growth to date      No Growth to date    Narrative:      Site # 1, aerobic and anaerobic    Blood culture (site 2) [[id number]] Collected: 10/12/23 1552    Order Status: Completed Specimen: Blood  Updated: 10/14/23 0612     Blood Culture, Routine No Growth to date      No Growth to date    Narrative:      Site # 2, aerobic only    Respiratory Infection Panel (PCR), Nasopharyngeal [[id number]] Collected: 10/12/23 1804    Order Status: Completed Specimen: Nasopharyngeal Swab Updated: 10/13/23 0208     Respiratory Infection Panel Source NP Swab     Adenovirus Not Detected     Coronavirus 229E, Common Cold Virus Not Detected     Coronavirus HKU1, Common Cold Virus Not Detected     Coronavirus NL63, Common Cold Virus Not Detected     Coronavirus OC43, Common Cold Virus Not Detected     Comment: The Coronavirus strains detected in this test cause the common cold.  These strains are not the COVID-19 (novel Coronavirus)strain   associated with the respiratory disease outbreak.          SARS-CoV2 (COVID-19) Qualitative PCR Not Detected     Human Metapneumovirus Not Detected     Human Rhinovirus/Enterovirus Not Detected     Influenza A (subtypes H1, H1-2009,H3) Not Detected     Influenza B Not Detected     Parainfluenza Virus 1 Not Detected     Parainfluenza Virus 2 Not Detected     Parainfluenza Virus 3 Not Detected     Parainfluenza Virus 4 Not Detected     Respiratory Syncytial Virus Not Detected     Bordetella Parapertussis (RJ8177) Not Detected     Bordetella pertussis (ptxP) Not Detected     Chlamydia pneumoniae Not Detected     Mycoplasma pneumoniae Not Detected    Narrative:      For all other respiratory sources, order [id number] -  Respiratory Viral Panel by PCR    Influenza A & B by Molecular [[id number]] Collected: 10/12/23 2005    Order Status: Completed Specimen: Nasopharyngeal Swab Updated: 10/12/23 2033     Influenza A, Molecular Negative     Influenza B, Molecular Negative     Flu A & B Source Nasal swab            Radiology:  I have personally reviewed the above labs and imaging.    Current Medications:     Infusions:   NORepinephrine bitartrate-D5W 0.04 mcg/kg/min (10/14/23 1107)        Scheduled:    sodium chloride 0.9%   Intravenous Once    sodium chloride 0.9%   Intravenous Once    sodium chloride 0.9%   Intravenous Once    albuterol-ipratropium  3 mL Nebulization Q4H    azithromycin  500 mg Intravenous Q24H    busPIRone  10 mg Oral Once per day on Mon Wed Fri    cefTRIAXone (ROCEPHIN) IVPB  2 g Intravenous Q24H    epoetin hemant-epbx  10,000 Units Subcutaneous Every Mon, Wed, Fri    fluticasone furoate-vilanteroL  1 puff Inhalation Daily    furosemide  80 mg Oral Every Tues, Thurs, Sat    heparin (porcine)  5,000 Units Subcutaneous Q12H    megestroL  20 mg Oral BID    midodrine  15 mg Oral TID WM    mirtazapine  7.5 mg Oral QHS    mupirocin   Nasal BID    predniSONE  40 mg Oral Daily    sevelamer carbonate  800 mg Oral TID WM        PRN:  sodium chloride 0.9%, sodium chloride 0.9%, sodium chloride 0.9%, sodium chloride 0.9%, sodium chloride 0.9%, acetaminophen, albumin human 25%, albuterol sulfate, albuterol sulfate, heparin (porcine), HYDROcodone-acetaminophen, LORazepam, melatonin, sodium chloride 0.9%, sodium chloride 0.9%, sodium chloride 0.9%, sodium chloride 0.9%, sodium chloride 0.9%, sodium chloride 0.9%, sodium chloride 3%    Assessment:     Katie Quevedo is a 78 y.o.female with  Patient Active Problem List    Diagnosis Date Noted    Hyperkalemia 09/30/2023    Community acquired pneumonia of left lower lobe of lung 09/29/2023    Goals of care, counseling/discussion 09/29/2023    Sepsis 09/29/2023    Benzodiazepine dependence, continuous 08/18/2023    Chronic obstructive pulmonary disease with acute exacerbation     Coagulation defect, unspecified 04/25/2023    Unspecified mood (affective) disorder 04/25/2023    Aortic atherosclerosis 12/15/2022    Physical deconditioning 12/10/2022    Hypokalemia 12/09/2022    Adjustment reaction with anxiety and depression 11/01/2022    Advance care planning 10/18/2022    severe stage 4 COPD 10/13/2022    Hypotension 10/13/2022    Influenza A virus present 10/11/2022     Clotted dialysis access 01/03/2022    Secondary hyperparathyroidism 03/30/2021    Orthopnea 11/10/2020    SOB (shortness of breath) 11/09/2020    Acute on chronic heart failure 11/09/2020    Pleural effusion 11/09/2020    Medication management 05/28/2020    Dialysis AV fistula malfunction, sequela 03/09/2020    Diarrhea 02/28/2020    Palliative care encounter 02/26/2020    ESRD (end stage renal disease) on dialysis 02/19/2020    Diastolic dysfunction, left ventricle 02/04/2020    Clotted renal dialysis arteriovenous graft 02/03/2020    Nausea 01/14/2020    Pulmonary cachexia due to COPD 12/18/2019    Chronic diastolic heart failure 10/30/2019    Lipoma of torso 10/10/2019    Chronic respiratory failure with hypoxia, on home O2 therapy 07/18/2019    Shortness of breath 07/17/2019    Pericardial effusion 07/05/2019    Pleural effusion, left     Acute on chronic respiratory failure with hypoxia 03/05/2018    History of colon polyps 02/06/2018    Atrophy of left kidney 01/25/2018    Kidney cysts 01/25/2018    Iron deficiency anemia 01/22/2018    Essential hypertension 01/22/2018    Osteoporosis 09/21/2015    Osteoarthritis, hip, bilateral 10/27/2014    Lumbar radiculopathy, BLE 10/27/2014    Cervical radiculopathy, BUE 10/27/2014    Biceps tendonitis 01/06/2014    Rotator cuff tear, right 10/18/2013    Nuclear sclerosis - Both Eyes 08/08/2013    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 Alert and oriented x4.     [facility]  #Hypotension  Likely in the setting of longstanding dialysis and vasoplegia.  - Continue home midodrine  - Currently requiring low  doses of levophed to maintain MAPs of >65. Wean as able.     #HTN: Antihypertensives held in setting of hypotension. Avoid over diuresing.     #CHF(HFpEF): fluid restriction, strict I's/o's. Low sodium diet. TTE pending.   - . EKG negative for acute ischemic changes.   -furosemide BID, avoid in setting of hypotension.     CHF:   PULM  #COPD exacerbation vs #volume overload 2/2 HD intolerance  Chest xray : pleural effusions and bilateral basilar subsegmental atelectasis.  Developing left lower lung zone consolidation not excluded  CTA: negative for PE. Significant for small area of consolidation and parapneumonic effusion in the left lower lobe.  Correlate for area of chronic atelectasis and effusion versus pneumonia  - Will continue CAP coverage due to increased risk of infection 2/2 COPD. Blood cultures and respiratory cx pending. However, volume overload thought to be likely etiology of recurring dyspnea presentation.   -Continue duonebs Q4H. Prednisone 40 mg QID.       GI  No acute issues. Continue cardiac diet.     RENAL   #ESRD on HD mwf. Plans for dialysis today. Midodrine increased to 15 BID. Lasix to be held on days of dialysis, per nephrology. Concerns for HD toleration, as patient becomes hypotensive and tachycardic with each session limiting fluid removal. Which Is likely etiology for frequent presentation for dyspnea. Minimal UOP with lasix.   - continue MWF HD. Epo injection as needed with HD.  -QID renal function panel  - avoid renal toxic drugs, renally dose meds   - Sevelamer TID.   - Closely monitor electrolytes k, mg, phosh.     ENDOCRINE  Accu-chex. Goal 140-180 glucose.    HEME/ONC  #Macrocytic anemia  Folate and B-12 within normal range.   CTM.Transfuse <7.   VTE prophylaxis:     ID  See PULM.     PSYCH   #Anxiety. Lorazepam PRN. Continue buspirone as scheduled.     Thank you for allowing us to participate in the care of this patient, we will continue to follow. Please let us know if  there are questions or concerns.      Linh Vergara MD  [facility] Fellow     Pt seen and examined with Pulmonary/Critical Care team and this note was reviewed and validated with the following additional comments: Known to me from prior admits.  Elderly woman with severe COPD, CHF, and ESRD.  Her health status has steadily declined.  She is now frail and unable to tolerate dialysis without vasopressors.  Dr. Da Silva and I have both had a conversation with her daughter about goals of care.  She was tearful but recognizes that the end is near.  More discussions tomorrow.    Critical Care time was spent validating the history and physical exam, reviewing the lab and imaging results, and discussing the care of the patient with the bedside nurse and the patient and/or surrogates. This critical care time did not overlap with that of any other provider or involve time for any procedures.  This patient has a high probability of sudden clinically significant deterioration which requires the highest level of physician preparedness to intervene urgently. I managed/supervised life or organ supporting interventions that required frequent physician assessments. I devoted my full attention in the ICU to the direct care of this patient for this period of time. Organ systems which are failing and require intensive, critical care support are: renal, cardiac, respiratory, nutritional.  Critical Care time: 50 minutes    Evan Caban MD  Phone [phone]

## 2023-10-14 NOTE — EICU
Intervention Initiated From:  Bedside    Francisco intervened regarding:  BP    Doctor Notified:  Yes.     Comments: Bedside RN concerned about low B/P and MAP. Reports 3 liters removed with Dialysis. Patient is on midodrine.   Dr. Herr notified.

## 2023-10-15 LAB
ALBUMIN SERPL BCP-MCNC: 3.5 G/DL (ref 3.5–5.2)
ANION GAP SERPL CALC-SCNC: 14 MMOL/L (ref 8–16)
BASOPHILS # BLD AUTO: 0.04 K/UL (ref 0–0.2)
BASOPHILS NFR BLD: 0.2 % (ref 0–1.9)
BUN SERPL-MCNC: 58 MG/DL (ref 8–23)
CALCIUM SERPL-MCNC: 8.5 MG/DL (ref 8.7–10.5)
CHLORIDE SERPL-SCNC: 92 MMOL/L (ref 95–110)
CO2 SERPL-SCNC: 25 MMOL/L (ref 23–29)
CREAT SERPL-MCNC: 5.4 MG/DL (ref 0.5–1.4)
DIFFERENTIAL METHOD: ABNORMAL
EOSINOPHIL # BLD AUTO: 0 K/UL (ref 0–0.5)
EOSINOPHIL NFR BLD: 0 % (ref 0–8)
ERYTHROCYTE [DISTWIDTH] IN BLOOD BY AUTOMATED COUNT: 15.1 % (ref 11.5–14.5)
EST. GFR  (NO RACE VARIABLE): 8 ML/MIN/1.73 M^2
GLUCOSE SERPL-MCNC: 109 MG/DL (ref 70–110)
HCT VFR BLD AUTO: 30.5 % (ref 37–48.5)
HGB BLD-MCNC: 9.9 G/DL (ref 12–16)
IMM GRANULOCYTES # BLD AUTO: 0.8 K/UL (ref 0–0.04)
IMM GRANULOCYTES NFR BLD AUTO: 4 % (ref 0–0.5)
LYMPHOCYTES # BLD AUTO: 1.8 K/UL (ref 1–4.8)
LYMPHOCYTES NFR BLD: 8.7 % (ref 18–48)
MCH RBC QN AUTO: 32.7 PG (ref 27–31)
MCHC RBC AUTO-ENTMCNC: 32.5 G/DL (ref 32–36)
MCV RBC AUTO: 101 FL (ref 82–98)
MONOCYTES # BLD AUTO: 1.4 K/UL (ref 0.3–1)
MONOCYTES NFR BLD: 6.9 % (ref 4–15)
NEUTROPHILS # BLD AUTO: 16.2 K/UL (ref 1.8–7.7)
NEUTROPHILS NFR BLD: 80.2 % (ref 38–73)
NRBC BLD-RTO: 0 /100 WBC
PHOSPHATE SERPL-MCNC: 3.5 MG/DL (ref 2.7–4.5)
PLATELET # BLD AUTO: 182 K/UL (ref 150–450)
PMV BLD AUTO: 10 FL (ref 9.2–12.9)
POTASSIUM SERPL-SCNC: 5.2 MMOL/L (ref 3.5–5.1)
RBC # BLD AUTO: 3.03 M/UL (ref 4–5.4)
SODIUM SERPL-SCNC: 131 MMOL/L (ref 136–145)
WBC # BLD AUTO: 20.13 K/UL (ref 3.9–12.7)

## 2023-10-15 PROCEDURE — 25000242 PHARM REV CODE 250 ALT 637 W/ HCPCS: Mod: HCNC | Performed by: FAMILY MEDICINE

## 2023-10-15 PROCEDURE — 25000003 PHARM REV CODE 250: Mod: HCNC | Performed by: FAMILY MEDICINE

## 2023-10-15 PROCEDURE — 20000000 HC ICU ROOM: Mod: HCNC

## 2023-10-15 PROCEDURE — 25000003 PHARM REV CODE 250: Mod: HCNC | Performed by: STUDENT IN AN ORGANIZED HEALTH CARE EDUCATION/TRAINING PROGRAM

## 2023-10-15 PROCEDURE — 25000003 PHARM REV CODE 250: Mod: HCNC | Performed by: NURSE PRACTITIONER

## 2023-10-15 PROCEDURE — 21400001 HC TELEMETRY ROOM: Mod: HCNC

## 2023-10-15 PROCEDURE — 99900035 HC TECH TIME PER 15 MIN (STAT): Mod: HCNC

## 2023-10-15 PROCEDURE — 63600175 PHARM REV CODE 636 W HCPCS: Mod: HCNC | Performed by: FAMILY MEDICINE

## 2023-10-15 PROCEDURE — 27000221 HC OXYGEN, UP TO 24 HOURS: Mod: HCNC

## 2023-10-15 PROCEDURE — 94640 AIRWAY INHALATION TREATMENT: CPT | Mod: HCNC

## 2023-10-15 PROCEDURE — 36415 COLL VENOUS BLD VENIPUNCTURE: CPT | Mod: HCNC | Performed by: INTERNAL MEDICINE

## 2023-10-15 PROCEDURE — 25000003 PHARM REV CODE 250: Mod: HCNC | Performed by: INTERNAL MEDICINE

## 2023-10-15 PROCEDURE — 80069 RENAL FUNCTION PANEL: CPT | Mod: HCNC | Performed by: INTERNAL MEDICINE

## 2023-10-15 PROCEDURE — 63600175 PHARM REV CODE 636 W HCPCS: Mod: HCNC | Performed by: STUDENT IN AN ORGANIZED HEALTH CARE EDUCATION/TRAINING PROGRAM

## 2023-10-15 PROCEDURE — 85025 COMPLETE CBC W/AUTO DIFF WBC: CPT | Mod: HCNC | Performed by: INTERNAL MEDICINE

## 2023-10-15 PROCEDURE — 97530 THERAPEUTIC ACTIVITIES: CPT | Mod: HCNC,CQ

## 2023-10-15 PROCEDURE — 25000242 PHARM REV CODE 250 ALT 637 W/ HCPCS: Mod: HCNC | Performed by: STUDENT IN AN ORGANIZED HEALTH CARE EDUCATION/TRAINING PROGRAM

## 2023-10-15 PROCEDURE — 94761 N-INVAS EAR/PLS OXIMETRY MLT: CPT | Mod: HCNC

## 2023-10-15 RX ORDER — ONDANSETRON 2 MG/ML
4 INJECTION INTRAMUSCULAR; INTRAVENOUS EVERY 6 HOURS PRN
Status: DISCONTINUED | OUTPATIENT
Start: 2023-10-15 | End: 2023-10-27 | Stop reason: HOSPADM

## 2023-10-15 RX ORDER — ONDANSETRON HYDROCHLORIDE 4 MG/5ML
4 SOLUTION ORAL EVERY 6 HOURS PRN
Status: DISCONTINUED | OUTPATIENT
Start: 2023-10-15 | End: 2023-10-15

## 2023-10-15 RX ORDER — NOREPINEPHRINE BITARTRATE/D5W 4MG/250ML
0-.2 PLASTIC BAG, INJECTION (ML) INTRAVENOUS CONTINUOUS
Status: DISCONTINUED | OUTPATIENT
Start: 2023-10-15 | End: 2023-10-16

## 2023-10-15 RX ORDER — HYDROXYZINE HYDROCHLORIDE 25 MG/1
25 TABLET, FILM COATED ORAL 3 TIMES DAILY PRN
Status: DISCONTINUED | OUTPATIENT
Start: 2023-10-15 | End: 2023-10-27 | Stop reason: HOSPADM

## 2023-10-15 RX ADMIN — MIDODRINE HYDROCHLORIDE 15 MG: 5 TABLET ORAL at 05:10

## 2023-10-15 RX ADMIN — PREDNISONE 40 MG: 20 TABLET ORAL at 09:10

## 2023-10-15 RX ADMIN — MIRTAZAPINE 7.5 MG: 7.5 TABLET ORAL at 08:10

## 2023-10-15 RX ADMIN — AZITHROMYCIN MONOHYDRATE 500 MG: 500 INJECTION, POWDER, LYOPHILIZED, FOR SOLUTION INTRAVENOUS at 03:10

## 2023-10-15 RX ADMIN — HYDROCODONE BITARTRATE AND ACETAMINOPHEN 1 TABLET: 10; 325 TABLET ORAL at 05:10

## 2023-10-15 RX ADMIN — MIDODRINE HYDROCHLORIDE 15 MG: 5 TABLET ORAL at 12:10

## 2023-10-15 RX ADMIN — MIDODRINE HYDROCHLORIDE 15 MG: 5 TABLET ORAL at 08:10

## 2023-10-15 RX ADMIN — ACETAMINOPHEN 325MG 650 MG: 325 TABLET ORAL at 04:10

## 2023-10-15 RX ADMIN — ONDANSETRON 4 MG: 2 INJECTION INTRAMUSCULAR; INTRAVENOUS at 07:10

## 2023-10-15 RX ADMIN — IPRATROPIUM BROMIDE AND ALBUTEROL SULFATE 3 ML: 2.5; .5 SOLUTION RESPIRATORY (INHALATION) at 11:10

## 2023-10-15 RX ADMIN — SEVELAMER CARBONATE 800 MG: 800 TABLET, FILM COATED ORAL at 12:10

## 2023-10-15 RX ADMIN — CEFTRIAXONE SODIUM 2 G: 2 INJECTION, POWDER, FOR SOLUTION INTRAMUSCULAR; INTRAVENOUS at 03:10

## 2023-10-15 RX ADMIN — FLUTICASONE FUROATE AND VILANTEROL TRIFENATATE 1 PUFF: 200; 25 POWDER RESPIRATORY (INHALATION) at 08:10

## 2023-10-15 RX ADMIN — ONDANSETRON HYDROCHLORIDE 4 MG: 4 SOLUTION ORAL at 03:10

## 2023-10-15 RX ADMIN — HEPARIN SODIUM 5000 UNITS: 5000 INJECTION INTRAVENOUS; SUBCUTANEOUS at 08:10

## 2023-10-15 RX ADMIN — SEVELAMER CARBONATE 800 MG: 800 TABLET, FILM COATED ORAL at 08:10

## 2023-10-15 RX ADMIN — SEVELAMER CARBONATE 800 MG: 800 TABLET, FILM COATED ORAL at 05:10

## 2023-10-15 RX ADMIN — MEGESTROL ACETATE 20 MG: 20 TABLET ORAL at 08:10

## 2023-10-15 RX ADMIN — HEPARIN SODIUM 5000 UNITS: 5000 INJECTION INTRAVENOUS; SUBCUTANEOUS at 09:10

## 2023-10-15 RX ADMIN — MEGESTROL ACETATE 20 MG: 20 TABLET ORAL at 09:10

## 2023-10-15 RX ADMIN — MUPIROCIN: 20 OINTMENT TOPICAL at 08:10

## 2023-10-15 RX ADMIN — HYDROCODONE BITARTRATE AND ACETAMINOPHEN 1 TABLET: 10; 325 TABLET ORAL at 07:10

## 2023-10-15 RX ADMIN — IPRATROPIUM BROMIDE AND ALBUTEROL SULFATE 3 ML: 2.5; .5 SOLUTION RESPIRATORY (INHALATION) at 08:10

## 2023-10-15 RX ADMIN — IPRATROPIUM BROMIDE AND ALBUTEROL SULFATE 3 ML: 2.5; .5 SOLUTION RESPIRATORY (INHALATION) at 04:10

## 2023-10-15 RX ADMIN — NOREPINEPHRINE BITARTRATE 0.06 MCG/KG/MIN: 4 INJECTION, SOLUTION INTRAVENOUS at 02:10

## 2023-10-15 RX ADMIN — Medication 6 MG: at 01:10

## 2023-10-15 RX ADMIN — NEPHROCAP 1 CAPSULE: 1 CAP ORAL at 12:10

## 2023-10-15 RX ADMIN — MUPIROCIN: 20 OINTMENT TOPICAL at 09:10

## 2023-10-15 RX ADMIN — IPRATROPIUM BROMIDE AND ALBUTEROL SULFATE 3 ML: 2.5; .5 SOLUTION RESPIRATORY (INHALATION) at 12:10

## 2023-10-15 RX ADMIN — IPRATROPIUM BROMIDE AND ALBUTEROL SULFATE 3 ML: 2.5; .5 SOLUTION RESPIRATORY (INHALATION) at 03:10

## 2023-10-15 NOTE — PROGRESS NOTES
Nephrology Progress Note       Consult Requested By: Sonu Martínez MD  Reason for Consult: ESRD     SUBJECTIVE:            ?    Review of Systems   Constitutional:  Negative for chills, fever and malaise/fatigue.   HENT:  Negative for congestion and sore throat.    Eyes:  Negative for blurred vision, double vision and photophobia.   Respiratory:  Positive for shortness of breath (chronic). Negative for cough.    Cardiovascular:  Negative for chest pain, palpitations and leg swelling.   Gastrointestinal:  Negative for abdominal pain, diarrhea, nausea and vomiting.   Genitourinary:  Negative for dysuria and urgency.   Musculoskeletal:  Negative for joint pain and myalgias.   Skin:  Negative for itching and rash.   Neurological:  Negative for dizziness, sensory change, weakness and headaches.   Endo/Heme/Allergies:  Negative for polydipsia. Does not bruise/bleed easily.   Psychiatric/Behavioral:  Negative for depression. The patient is nervous/anxious.        Past Medical History:   Diagnosis Date    Acute congestive heart failure 02/10/2020    Anemia     Bilateral renal cysts     Cataract     Chronic LBP 7/26/2012    Chronic pain     CKD (chronic kidney disease), stage IV     Colon polyp 2013    COPD (chronic obstructive pulmonary disease)     Dehydration     Encounter for blood transfusion     HTN (hypertension)     Lumbar spondylosis     Melanoma     of the lip    Metabolic bone disease     Migraines, neuralgic     Osteoporosis     Primary osteoarthritis of both knees     s/p Rt TKA    Pulmonary embolism with infarction     Seizures 1972    x1 only    Subdeltoid bursitis, L>R. 9/27/2012    Ulcer     Vitamin D deficiency disease           OBJECTIVE:     Vital Signs (Most Recent)  Vitals:    10/15/23 0915 10/15/23 0930 10/15/23 0945 10/15/23 1015   BP: (!) 93/51  (!) 140/73 131/68   Pulse: (!) 120 (!) 114 (!) 126 (!) 116   Resp: (!) 23 (!) 28 (!) 29 (!) 25   Temp:       TempSrc:       SpO2: 95% 96% 96% 96%   Weight:        Height:                     Medications:          Physical Exam  Vitals and nursing note reviewed.   Constitutional:       General: She is not in acute distress.     Appearance: She is ill-appearing. She is not diaphoretic.      Comments: Frail    HENT:      Head: Normocephalic and atraumatic.      Mouth/Throat:      Pharynx: No oropharyngeal exudate.   Eyes:      General: No scleral icterus.     Conjunctiva/sclera: Conjunctivae normal.      Pupils: Pupils are equal, round, and reactive to light.   Cardiovascular:      Rate and Rhythm: Normal rate and regular rhythm.      Heart sounds: Normal heart sounds. No murmur heard.  Pulmonary:      Effort: No respiratory distress.      Breath sounds: Rales present. No wheezing.   Abdominal:      General: Bowel sounds are normal. There is no distension.      Palpations: Abdomen is soft.      Tenderness: There is no abdominal tenderness.   Musculoskeletal:         General: Normal range of motion.      Cervical back: Normal range of motion and neck supple.      Right lower leg: No edema.      Left lower leg: No edema.      Comments:    CVCRIJ    Skin:     General: Skin is warm and dry.      Findings: No erythema.   Neurological:      Mental Status: She is alert and oriented to person, place, and time.      Cranial Nerves: No cranial nerve deficit.   Psychiatric:         Mood and Affect: Affect normal. Mood is anxious.         Behavior: Behavior is agitated.         Cognition and Memory: Memory normal.         Judgment: Judgment normal.         Laboratory:  Recent Labs   Lab 10/13/23  0341 10/14/23  0523 10/15/23  0433   WBC 7.80 26.97* 20.13*   HGB 9.7* 9.6* 9.9*   HCT 30.6* 29.6* 30.5*    291 182   MONO 2.7*  0.2* 4.0  CANCELED 6.9  1.4*   EOSINOPHIL 0.0 0.0 0.0       Recent Labs   Lab 10/13/23  0341 10/14/23  0523 10/15/23  0433   * 136 131*   K 5.4* 3.8 5.2*   CL 96 97 92*   CO2 17* 23 25   BUN 55* 17 58*   CREATININE 5.3* 2.7* 5.4*   CALCIUM 8.0* 8.7  8.5*   PHOS 4.2 3.2 3.5         Diagnostic Results:  X-Ray: Reviewed  US: Reviewed  Echo: Reviewed  ASSESSMENT/PLAN:     1. ESRD  - HD MWF [place] With Dr Aura Diggs   --   at her Unit  with minimal pull  due to Hypotension and tachycardia anxiety.   -- Now here again SOB Hyperkalemia volume overload   -- HD with [facility]  Friday  3L and U[facility] Saturday   Give midodrine  before HD stop lasix   Can take lasix on TTSS as its contributing to her hypotension   Has WBC and low BP VIJAY always low received steroids on admission   hold her benzo's before HD to avoid Hypotension    Discussed with patient and  - HD tomorrow      -- Keep MWF after   -- Daily Renal Function Panel  -- Avoid Hypotension.  -- Renally dose all meds    2. HTN (I10) -  Controlled   3. Anemia of chronic kidney disease treated with BILL     EPogen  with   HD as needed    Recent Labs   Lab 10/13/23  0341 10/14/23  0523 10/15/23  0433   HGB 9.7* 9.6* 9.9*   HCT 30.6* 29.6* 30.5*    291 182         Iron   Lab Results   Component Value Date    IRON 13 (L) 02/12/2020    TIBC 462 (H) 02/12/2020    FERRITIN 148 07/17/2019       4. MBD (E88.9 M90.80) -  Recent Labs   Lab 10/15/23  0433   CALCIUM 8.5*   PHOS 3.5       Recent Labs   Lab 10/12/23  1232 10/12/23  1552   MG 2.2 2.1         Lab Results   Component Value Date    .0 (H) 12/28/2018    CALCIUM 8.5 (L) 10/15/2023    PHOS 3.5 10/15/2023     Lab Results   Component Value Date    [id number] 26 (L) 02/12/2020     Sevelamer   Lab Results   Component Value Date    CO2 25 10/15/2023       5. Nutrition/Hypoalbuminemia   Recent Labs   Lab 10/14/23  0523 10/15/23  0433   ALBUMIN 3.8 3.5       Nepro with meals TID. Renal vitamins daily   .    Thank you for the consult, will follow  With any question please call [phone]  Ramo Da Silva MD    [facility]     NEIL Diggs MD,   O. NimMD CURTIS johnson MD E. V. Harmon, NP    [id number]  [place], [phone]  [phone]

## 2023-10-15 NOTE — PLAN OF CARE
"  Care Plan    POC reviewed with Katie ZAPATA Wildwood and family. Questions and concerns addressed. No acute events today. Pt is progressing  slowly towards goals. Worked with physical therapy today  ambulated to the chair with walker and PT assist, gait weak and unsteady will continue to do more each day toward goals. Will continue to monitor. See below and flowsheets for full assessment and VS info.       Neuro:  [place] Coma Scale  Best Eye Response: 4-->(E4) spontaneous  Best Motor Response: 6-->(M6) obeys commands  Best Verbal Response: 5-->(V5) oriented  [place] Coma Scale Score: 15  Assessment Qualifiers: patient not sedated/intubated  Pupil PERRLA: yes  24 hr Temp:  [97 °F (36.1 °C)-98.2 °F (36.8 °C)]      CV:  Rhythm: sinus tachycardia  DVT prophylaxis: VTE Required Core Measure: Pharmacological prophylaxis initiated/maintained    Resp:     Oxygen Concentration (%): 28    GI/:  GI prophylaxis: yes  Diet/Nutrition Received: 2 gram sodium, low saturated fat/low cholesterol  Last Bowel Movement: 10/12/23  Voiding Characteristics: external catheter   Intake/Output Summary (Last 24 hours) at 10/15/2023 1844  Last data filed at 10/15/2023 1843  Gross per 24 hour   Intake 1540.88 ml   Output 2500 ml   Net -959.12 ml       Labs/Accuchecks:  Recent Labs   Lab 10/15/23  0433   WBC 20.13*   RBC 3.03*   HGB 9.9*   HCT 30.5*         Recent Labs   Lab 10/15/23  0433   *   K 5.2*   CO2 25   CL 92*   BUN 58*   CREATININE 5.4*    No results for input(s): "PROTIME", "INR", "APTT", "HEPANTIXA" in the last 168 hours.   Recent Labs   Lab 10/12/23  1836   TROPONINI 0.134*       Electrolytes: No replacement orders  Accuchecks: none    Gtts/LDAs:   NORepinephrine bitartrate-D5W Stopped (10/15/23 0435)       Lines/Drains/Airways       Central Venous Catheter Line  Duration                  Hemodialysis AV Graft Left upper arm -- days    Permacath  right subclavian -- days    Permacath right subclavian -- days              " Drain  Duration             Female External Urinary Catheter 10/12/23 1800 3 days              Peripheral Intravenous Line  Duration                  Peripheral IV - Single Lumen 10/12/23 2245 20 G;1 3/4 in Anterior;Right Upper Arm 2 days         Peripheral IV - Single Lumen 10/14/23 0315 20 G Right Forearm 1 day                    Skin/Wounds  Bathing/Skin Care: other (see comments) (hands and face waashed) (10/15/23 0745)  Wounds: No  Wound care consulted: No    Consults  Consults (From admission, onward)          Status Ordering Provider     Inpatient consult to Midline team  Once        Provider:  (Not yet assigned)    Acknowledged WILBERTO CARR     Consult to Pulmonology  Once        Provider:  Roxana Matta MD    Completed MIN PONCE     Inpatient consult to [facility]phrology-[facility] (Caterina Bustillos Nimkevych)  Once        Provider:  Ramo Da Silva MD    Acknowledged WILBERTO CARR

## 2023-10-15 NOTE — PLAN OF CARE
"  Care Plan    POC reviewed with Katie Quevedo and family. Questions and concerns addressed. No acute events today. Pt is progressing toward goals. Able to stop Levo.gtt., was able to eat 25% of lunch, and 30% of supper. Plan is to transfer out of ICU to the telemetry unit.Will continue to monitor. See below and flowsheets for full assessment and VS info.       Neuro:  Shant Coma Scale  Best Eye Response: 4-->(E4) spontaneous  Best Motor Response: 6-->(M6) obeys commands  Best Verbal Response: 5-->(V5) oriented  Shant Coma Scale Score: 15  Assessment Qualifiers: patient not sedated/intubated  Pupil PERRLA: yes  24 hr Temp:  [97.5 °F (36.4 °C)-98.3 °F (36.8 °C)]      CV:  Rhythm: sinus tachycardia  DVT prophylaxis: VTE Required Core Measure: Pharmacological prophylaxis initiated/maintained    Resp:     Oxygen Concentration (%): 32    GI/:  GI prophylaxis: yes  Diet/Nutrition Received: low saturated fat/low cholesterol, 2 gram sodium  Last Bowel Movement: 10/12/23  Voiding Characteristics: oliguria   Intake/Output Summary (Last 24 hours) at 10/14/2023 1911  Last data filed at 10/14/2023 1909  Gross per 24 hour   Intake 2158.44 ml   Output 3500 ml   Net -1341.56 ml       Labs/Accuchecks:  Recent Labs   Lab 10/14/23  0523   WBC 26.97*   RBC 2.97*   HGB 9.6*   HCT 29.6*         Recent Labs   Lab 10/08/23  0039 10/08/23  0405 10/14/23  0523      < > 136   K 5.9*   < > 3.8   CO2 21*   < > 23      < > 97   BUN 67*   < > 17   CREATININE 4.9*   < > 2.7*   ALKPHOS 52*  --   --    ALT 50*  --   --    AST 27  --   --    BILITOT 0.5  --   --     < > = values in this interval not displayed.    No results for input(s): "PROTIME", "INR", "APTT", "HEPANTIXA" in the last 168 hours.   Recent Labs   Lab 10/12/23  1836   TROPONINI 0.134*       Electrolytes: Electrolytes replaced  Accuchecks: none    Gtts/LDAs:   NORepinephrine bitartrate-D5W Stopped (10/14/23 1261)       Lines/Drains/Airways       Central Venous " Catheter Line  Duration                  Hemodialysis AV Graft Left upper arm -- days    Permacath  right subclavian -- days    Permacath right subclavian -- days              Drain  Duration             Female External Urinary Catheter 10/12/23 1800 2 days              Peripheral Intravenous Line  Duration                  Peripheral IV - Single Lumen 10/12/23 2245 20 G;1 3/4 in Anterior;Right Upper Arm 1 day         Peripheral IV - Single Lumen 10/14/23 0315 20 G Right Forearm <1 day                    Skin/Wounds  Bathing/Skin Care: other (see comments) (hands and face washed) (10/14/23 0720)  Wounds: No  Wound care consulted: No    Consults  Consults (From admission, onward)          Status Ordering Provider     Inpatient consult to Midline team  Once        Provider:  (Not yet assigned)    Acknowledged MIN PONCE     Consult to Pulmonology  Once        Provider:  Roxana Matta MD    Completed MIN PONCE     Inpatient consult to [facility] (Caterina Bustillos, Paula)  Once        Provider:  Ramo Da Silva MD    Acknowledged MIN PONCE

## 2023-10-15 NOTE — EICU
Intervention Initiated From:  Bedside    Francisco intervened regarding:  Pain      Doctor Notified:  Yes    Comments:  Bedside RN reports patient c/o ear pain.   Dr. Villalobos notified

## 2023-10-15 NOTE — PLAN OF CARE
Patient's BP dropped below MAP of 65 last night. Levo restarted, but was able to be turned off again. MAP sustaining above 65. No other acute events. VSS. Safety, hygiene, and comfort needs addressed.     Problem: Adult Inpatient Plan of Care  Goal: Plan of Care Review  Outcome: Ongoing, Progressing  Goal: Patient-Specific Goal (Individualized)  Outcome: Ongoing, Progressing  Goal: Absence of Hospital-Acquired Illness or Injury  Outcome: Ongoing, Progressing  Goal: Optimal Comfort and Wellbeing  Outcome: Ongoing, Progressing  Goal: Readiness for Transition of Care  Outcome: Ongoing, Progressing

## 2023-10-15 NOTE — NURSING
Dialysis RN at bedside. Stated that 2 L was able to be removed from patient. Patient tolerated well. VSS. Levo off. Will continue with POC.

## 2023-10-15 NOTE — PT/OT/SLP PROGRESS
Physical Therapy Treatment    Patient Name:  Katie Quevedo   MRN:  [id number]    Recommendations:     Discharge Recommendations: other (see comments) (TBD)  Discharge Equipment Recommendations: bedside commode  Barriers to discharge: significantly impaired endurance.    Assessment:     Katie Quevedo is a 78 y.o. female admitted with a medical diagnosis of Acute on chronic respiratory failure with hypoxia.  She presents with the following impairments/functional limitations: weakness, decreased coordination, gait instability, impaired functional mobility, decreased lower extremity function, impaired balance, impaired cardiopulmonary response to activity, impaired endurance.    Rehab Prognosis: Good; patient would benefit from acute skilled PT services to address these deficits and reach maximum level of function.    Recent Surgery: * No surgery found *      Plan:     During this hospitalization, patient to be seen 5 x/week to address the identified rehab impairments via gait training, therapeutic activities, therapeutic exercises, neuromuscular re-education and progress toward the following goals:    Plan of Care Expires:  11/13/23    Subjective     Chief Complaint: Pt with no complaints or concerns at this time.   Patient/Family Comments/goals: Pt agreeable to PT treatment.   Pain/Comfort:  Pain Rating 1: 0/10      Objective:     Communicated with nursing prior to session.  Patient found HOB elevated with blood pressure cuff, telemetry, pulse ox (continuous), peripheral IV, oxygen, PureWick upon PT entry to room.     General Precautions: Standard, fall, respiratory  Orthopedic Precautions: N/A  Braces: N/A       Functional Mobility:  Bed Mobility:     Supine to Sit: stand by assistance  Transfers:     Sit to Stand:  contact guard assistance with rolling walker  Bed to Chair: contact guard assistance with  rolling walker  using  Step Transfer  Balance: Pt with good sitting and fair standing balance.       AM-PAC 6 CLICK  MOBILITY  Turning over in bed (including adjusting bedclothes, sheets and blankets)?: 3  Sitting down on and standing up from a chair with arms (e.g., wheelchair, bedside commode, etc.): 3  Moving from lying on back to sitting on the side of the bed?: 3  Moving to and from a bed to a chair (including a wheelchair)?: 3  Need to walk in hospital room?: 3  Climbing 3-5 steps with a railing?: 1  Basic Mobility Total Score: 16       Treatment & Education:      Patient left up in chair with all lines intact, call button in reach, and nursing present..    GOALS:   Multidisciplinary Problems       Physical Therapy Goals          Problem: Physical Therapy    Goal Priority Disciplines Outcome Goal Variances Interventions   Physical Therapy Goal     PT, PT/OT Ongoing, Progressing     Description: Goals to be met by: 23     Patient will increase functional independence with mobility by performin. Sit to stand transfer with Supervision  2. Bed to chair transfer with Supervision using RW or rollator  3. Gait  x 50 feet with Supervision using rollator or RW.   4. Lower extremity exercise program x10 reps per handout, with independence                         Time Tracking:     PT Received On: 10/15/23  PT Start Time: 926     PT Stop Time: 943  PT Total Time (min): 17 min     Billable Minutes: Therapeutic Activity 17    Treatment Type: Treatment  PT/PTA: PTA     Number of PTA visits since last PT visit: 1     10/15/2023

## 2023-10-15 NOTE — PROGRESS NOTES
[facility] Progress Note    Subjective:      No acute events overnight. She underwent dialysis last evening and required low doses of pressors, but those have since been weaned. No new concerns otherwise.      Objective:     Last 24 Hour Vital Signs:  BP  Min: 77/46  Max: 140/73  Temp  Av.7 °F (36.5 °C)  Min: 97 °F (36.1 °C)  Max: 98.3 °F (36.8 °C)  Pulse  Av.1  Min: 90  Max: 129  Resp  Av.7  Min: 10  Max: 35  SpO2  Av.1 %  Min: 69 %  Max: 100 %  I/O last 3 completed shifts:  In: 2534.8 [P.O.:940; I.V.:259.1; Other:500; IV Piggyback:835.7]  Out: 6000 [Other:6000]    Physical Examination:  Physical Exam  HENT:      Head: Normocephalic.      Nose: No rhinorrhea.   Eyes:      General: No scleral icterus.     Extraocular Movements: Extraocular movements intact.      Pupils: Pupils are equal, round, and reactive to light.   Cardiovascular:      Rate and Rhythm: Normal rate and regular rhythm.      Pulses: Normal pulses.      Heart sounds: Normal heart sounds.   Pulmonary:      Effort: Pulmonary effort is normal. No respiratory distress.      Breath sounds: No rhonchi.      Comments: Decreased breath sounds.   Chest:      Chest wall: No tenderness.   Abdominal:      General: Abdomen is flat. Bowel sounds are normal.      Palpations: Abdomen is soft.      Tenderness: There is no abdominal tenderness. There is no guarding or rebound.   Musculoskeletal:         General: No swelling, tenderness or signs of injury.      Right lower leg: No edema.      Left lower leg: No edema.   Skin:     Coloration: Skin is not jaundiced.   Neurological:      General: No focal deficit present.      Mental Status: She is alert and oriented to person, place, and time.      Motor: No weakness.   Psychiatric:         Mood and Affect: Mood normal.         Behavior: Behavior normal.            Laboratory:  Trended Lab Data:  Recent Labs     10/12/23  1232 10/12/23  1248 10/12/23  1552 10/13/23  4551  10/14/23  0523 10/15/23  0433   WBC 16.24*  --   --  7.80 26.97* 20.13*   HGB 11.9*  --   --  9.7* 9.6* 9.9*   HCT 37.1   < >  --  30.6* 29.6* 30.5*     --   --  159 291 182     --   --  127* 136 131*   K 4.6  --   --  5.4* 3.8 5.2*     --   --  96 97 92*   CO2 23  --   --  17* 23 25   BUN 36*  --   --  55* 17 58*   CREATININE 4.3*  --   --  5.3* 2.7* 5.4*   GLU 98  --   --  132* 113* 109   CALCIUM 9.1  --   --  8.0* 8.7 8.5*   ALBUMIN  --   --   --  2.7* 3.8 3.5   MG 2.2  --  2.1  --   --   --    PHOS  --   --   --  4.2 3.2 3.5    < > = values in this interval not displayed.         Cardiac:   Recent Labs   Lab 10/12/23  1232 10/12/23  1836   TROPONINI 0.172* 0.134*   *  --          Urinalysis:   Lab Results   Component Value Date    LABURIN ESCHERICHIA COLI  >100,000 cfu/ml   (A) 11/08/2022    LABURIN KLEBSIELLA PNEUMONIAE  > 100,000 cfu/ml   (A) 11/08/2022    COLORU Yellow 11/04/2022    SPECGRAV 1.025 11/04/2022    NITRITE Negative 11/04/2022    KETONESU Negative 11/04/2022    UROBILINOGEN 2.0-3.0 (A) 11/04/2022       Microbiology:  Microbiology Results (last 7 days)       Procedure Component Value Units Date/Time    Blood culture (site 2) [[id number]] Collected: 10/12/23 1552    Order Status: Completed Specimen: Blood Updated: 10/15/23 0612     Blood Culture, Routine No Growth to date      No Growth to date      No Growth to date    Narrative:      Site # 2, aerobic only    Blood culture (site 1) [[id number]] Collected: 10/12/23 1552    Order Status: Completed Specimen: Blood from Antecubital, Right Hand Updated: 10/15/23 0612     Blood Culture, Routine No Growth to date      No Growth to date      No Growth to date    Narrative:      Site # 1, aerobic and anaerobic    Culture, Respiratory with Gram Stain [[id number]] Collected: 10/12/23 2013    Order Status: Completed Specimen: Respiratory from Sputum, Expectorated Updated: 10/14/23 1039     Respiratory Culture Normal respiratory milena      Gram Stain (Respiratory) <10 epithelial cells per low power field.     Gram Stain (Respiratory) No WBC's     Gram Stain (Respiratory) Moderate Gram positive cocci    Respiratory Infection Panel (PCR), Nasopharyngeal [[id number]] Collected: 10/12/23 1804    Order Status: Completed Specimen: Nasopharyngeal Swab Updated: 10/13/23 0208     Respiratory Infection Panel Source NP Swab     Adenovirus Not Detected     Coronavirus 229E, Common Cold Virus Not Detected     Coronavirus HKU1, Common Cold Virus Not Detected     Coronavirus NL63, Common Cold Virus Not Detected     Coronavirus OC43, Common Cold Virus Not Detected     Comment: The Coronavirus strains detected in this test cause the common cold.  These strains are not the COVID-19 (novel Coronavirus)strain   associated with the respiratory disease outbreak.          SARS-CoV2 (COVID-19) Qualitative PCR Not Detected     Human Metapneumovirus Not Detected     Human Rhinovirus/Enterovirus Not Detected     Influenza A (subtypes H1, H1-2009,H3) Not Detected     Influenza B Not Detected     Parainfluenza Virus 1 Not Detected     Parainfluenza Virus 2 Not Detected     Parainfluenza Virus 3 Not Detected     Parainfluenza Virus 4 Not Detected     Respiratory Syncytial Virus Not Detected     Bordetella Parapertussis (IQ7422) Not Detected     Bordetella pertussis (ptxP) Not Detected     Chlamydia pneumoniae Not Detected     Mycoplasma pneumoniae Not Detected    Narrative:      For all other respiratory sources, order [id number] -  Respiratory Viral Panel by PCR    Influenza A & B by Molecular [[id number]] Collected: 10/12/23 2005    Order Status: Completed Specimen: Nasopharyngeal Swab Updated: 10/12/23 2033     Influenza A, Molecular Negative     Influenza B, Molecular Negative     Flu A & B Source Nasal swab            Radiology:  I have personally reviewed the above labs and imaging.    Current Medications:     Infusions:   NORepinephrine bitartrate-D5W Stopped (10/15/23  0435)        Scheduled:   sodium chloride 0.9%   Intravenous Once    sodium chloride 0.9%   Intravenous Once    sodium chloride 0.9%   Intravenous Once    albuterol-ipratropium  3 mL Nebulization Q4H    azithromycin  500 mg Intravenous Q24H    busPIRone  10 mg Oral Once per day on Mon Wed Fri    cefTRIAXone (ROCEPHIN) IVPB  2 g Intravenous Q24H    epoetin hemant-epbx  10,000 Units Subcutaneous Every Mon, Wed, Fri    fluticasone furoate-vilanteroL  1 puff Inhalation Daily    furosemide  80 mg Oral Every Tues, Thurs, Sat    heparin (porcine)  5,000 Units Subcutaneous Q12H    megestroL  20 mg Oral BID    midodrine  15 mg Oral TID WM    mirtazapine  7.5 mg Oral QHS    mupirocin   Nasal BID    predniSONE  40 mg Oral Daily    sevelamer carbonate  800 mg Oral TID WM    vitamin renal formula (B-complex-vitamin c-folic acid)  1 capsule Oral Daily        PRN:  sodium chloride 0.9%, sodium chloride 0.9%, sodium chloride 0.9%, sodium chloride 0.9%, sodium chloride 0.9%, acetaminophen, albumin human 25%, albuterol sulfate, albuterol sulfate, carbamide peroxide, heparin (porcine), HYDROcodone-acetaminophen, hydrOXYzine HCL, LORazepam, melatonin, sodium chloride 0.9%, sodium chloride 0.9%, sodium chloride 0.9%, sodium chloride 0.9%, sodium chloride 0.9%, sodium chloride 0.9%, sodium chloride 3%    Assessment:     Katie Quevedo is a 78 y.o.female with  Patient Active Problem List    Diagnosis Date Noted    Hyperkalemia 09/30/2023    Community acquired pneumonia of left lower lobe of lung 09/29/2023    Goals of care, counseling/discussion 09/29/2023    Sepsis 09/29/2023    Benzodiazepine dependence, continuous 08/18/2023    Chronic obstructive pulmonary disease with acute exacerbation     Coagulation defect, unspecified 04/25/2023    Unspecified mood (affective) disorder 04/25/2023    Aortic atherosclerosis 12/15/2022    Physical deconditioning 12/10/2022    Hypokalemia 12/09/2022    Adjustment reaction with anxiety and depression  11/01/2022    Advance care planning 10/18/2022    severe stage 4 COPD 10/13/2022    Hypotension 10/13/2022    Influenza A virus present 10/11/2022    Clotted dialysis access 01/03/2022    Secondary hyperparathyroidism 03/30/2021    Orthopnea 11/10/2020    SOB (shortness of breath) 11/09/2020    Acute on chronic heart failure 11/09/2020    Pleural effusion 11/09/2020    Medication management 05/28/2020    Dialysis AV fistula malfunction, sequela 03/09/2020    Diarrhea 02/28/2020    Palliative care encounter 02/26/2020    ESRD (end stage renal disease) on dialysis 02/19/2020    Diastolic dysfunction, left ventricle 02/04/2020    Clotted renal dialysis arteriovenous graft 02/03/2020    Nausea 01/14/2020    Pulmonary cachexia due to COPD 12/18/2019    Chronic diastolic heart failure 10/30/2019    Lipoma of torso 10/10/2019    Chronic respiratory failure with hypoxia, on home O2 therapy 07/18/2019    Shortness of breath 07/17/2019    Pericardial effusion 07/05/2019    Pleural effusion, left     Acute on chronic respiratory failure with hypoxia 03/05/2018    History of colon polyps 02/06/2018    Atrophy of left kidney 01/25/2018    Kidney cysts 01/25/2018    Iron deficiency anemia 01/22/2018    Essential hypertension 01/22/2018    Osteoporosis 09/21/2015    Osteoarthritis, hip, bilateral 10/27/2014    Lumbar radiculopathy, BLE 10/27/2014    Cervical radiculopathy, BUE 10/27/2014    Biceps tendonitis 01/06/2014    Rotator cuff tear, right 10/18/2013    Nuclear sclerosis - Both Eyes 08/08/2013    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 Alert and oriented  x4.     [facility]  #Hypotension  Likely in the setting of longstanding dialysis and vasoplegia.  - Continue home midodrine  - off pressors. Mostly required it for dialysis yesterday, but MAPs are good today. Agree with holding benzos prior to dialysis to avoid worsening hypotension.     #HTN: Antihypertensives held in setting of hypotension. Avoid over diuresing.     #CHF(HFpEF): fluid restriction, strict I's/o's. Low sodium diet. TTE pending.   - . EKG negative for acute ischemic changes.   -furosemide BID, avoid in setting of hypotension.     CHF:   PULM  #COPD exacerbation vs #volume overload 2/2 HD intolerance  Chest xray : pleural effusions and bilateral basilar subsegmental atelectasis.  Developing left lower lung zone consolidation not excluded  CTA: negative for PE. Significant for small area of consolidation and parapneumonic effusion in the left lower lobe.  Correlate for area of chronic atelectasis and effusion versus pneumonia  - Will continue CAP coverage due to increased risk of infection 2/2 COPD. Blood cultures and respiratory cx pending. However, volume overload thought to be likely etiology of recurring dyspnea presentation.   -Continue duonebs Q4H. Prednisone 40 mg QID.       GI  No acute issues. Continue cardiac diet.     RENAL   #ESRD on HD mwf. Plans for dialysis today. Midodrine increased to 15 BID. Lasix to be held on days of dialysis, per nephrology. Concerns for HD toleration, as patient becomes hypotensive and tachycardic with each session limiting fluid removal. Which Is likely etiology for frequent presentation for dyspnea. Minimal UOP with lasix.   - continue MWF HD. Epo injection as needed with HD.  -QID renal function panel  - avoid renal toxic drugs, renally dose meds   - Sevelamer TID.   - Closely monitor electrolytes k, mg, phosh.     ENDOCRINE  Accu-chex. Goal 140-180 glucose.    HEME/ONC  #Macrocytic anemia  Folate and B-12 within normal range.   CTM.Transfuse <7.   VTE  prophylaxis:     ID  See PULM.     PSYCH   #Anxiety. Continue buspirone as scheduled.     Dispo: Overall, improved from her acute ICU needs, but given labile BP, ok to keep her in the unit until tomorrow and step her down then if MAPs remain stable. Nephro planning on dialyzing tomorrow again. Concerned about overarching declining health trajectory. Communicated with daughter yesterday.     Thank you for allowing us to participate in the care of this patient, we will continue to follow. Please let us know if there are questions or concerns.      Linh Vergara MD  [facility] Fellow     Pt seen and examined with Pulmonary/Critical Care team and this note reviewed and validated with the following additional comments: Up in chair today.  Vasopressor required when she got dialysis yesterday.  NO DIALYSIS TODAY.  We will see how she does tomorrow.    Medical Decision Making (MDM) was complex.  The number and complexity of problems addressed was high.  The amount and complexity of data reviewed was high.  The risk of complications and/or morbidity/mortality was high.  The tests ordered were chemistries.  I communicated with the following providers Nephrology.  Time spent in the care of this patient was 30 minutes    Evan Caban MD  Phone [phone]

## 2023-10-15 NOTE — PROGRESS NOTES
[facility] Medicine  Progress Note    Patient Name: Katie Quevedo  MRN: [id number]  Patient Class: IP- Inpatient   Admission Date: 10/12/2023  Length of Stay: 3 days  Attending Physician: Sonu Martínez MD  Primary Care Provider: Kingston Verduzco MD        Subjective:     Principal Problem:Acute on chronic respiratory failure with hypoxia                    [facility] Medicine  Progress Note     Patient Name: Katie Quevedo  MRN: [id number]  Patient Class: IP- Inpatient     Admission Date: 10/12/2023  Length of Stay: 2 days  Attending Physician: Sonu Martínez MD  Primary Care Provider: Kingston Verduzco MD           Subjective:      Principal Problem:<principal problem not specified>     Katie Quevedo is a 78 y.o.  female who  has a past medical history of Acute congestive heart failure (02/10/2020), Anemia, Bilateral renal cysts, Cataract, Chronic LBP (7/26/2012), Chronic pain, CKD (chronic kidney disease), stage IV, Colon polyp (2013), COPD (chronic obstructive pulmonary disease), Dehydration, Encounter for blood transfusion, HTN (hypertension), Lumbar spondylosis, Melanoma, Metabolic bone disease, Migraines, neuralgic, Osteoporosis, Primary osteoarthritis of both knees, Pulmonary embolism with infarction, Seizures (1972), Subdeltoid bursitis, L>R. (9/27/2012), Ulcer, and Vitamin D deficiency disease.. The patient presented to [facility] on 10/12/2023 with a primary complaint of Shortness of Breath (Pt presents to the ED for sob. Pt presents on a neb mask at 8lpm .  Pt has been hospitalized recently for pneumonia)     The patient was in their usual state of health until 2 days ago when she was discharge from [facility] for respiratory failure 2/2 pneumonia, COPD. She has had 2 other hospital admissions in the last 2 weeks. She was barely able to finish her HD session yesterday. Spoke with Dr. Montemayor, she has been struggling to complete HD  sessions over the last 6 weeks and has becoming more and more frail and tachycardic on the machine and there is some concern she will not be able to continue with dialysis. She asked to come to the ER today for worsening shortness of breath. She does not have BiPAP at home. Daughter has not observed any issues with swallowing or episodes concerning for aspiration. She is on chronic opiates, unclear if patient has been more sedated than usual at home.     Interval History: no acute event overnight. Continued Levophed. Hypotensive, appear septic. Leukocytosis and on IV Abx, Awaiting cx. On 3 L O2 NC.           Interval History: no acute event overnight. Continued Levophed. Hypotensive, appear septic. Leukocytosis and on IV Abx, Awaiting cx. On 3 L O2 NC.     Review of Systems      Constitutional:  Positive for malaise/fatigue.   Respiratory:  Positive for shortness of breath.    Cardiovascular:  Negative for chest pain and leg swelling.   Genitourinary:  Negative for dysuria, frequency and urgency.   Musculoskeletal:  Positive for back pain.   Neurological:  Positive for weakness. Negative for dizziness, tingling and headaches.   Psychiatric/Behavioral:  Negative for depression and memory loss.      Objective:     Vital Signs (Most Recent):  Temp: 98 °F (36.7 °C) (10/15/23 0300)  Pulse: (!) 117 (10/15/23 0818)  Resp: (!) 21 (10/15/23 0818)  BP: (!) 112/51 (10/15/23 0818)  SpO2: 96 % (10/15/23 0818) Vital Signs (24h Range):  Temp:  [97 °F (36.1 °C)-98.3 °F (36.8 °C)] 98 °F (36.7 °C)  Pulse:  [] 117  Resp:  [10-35] 21  SpO2:  [69 %-100 %] 96 %  BP: ()/(39-95) 112/51     Weight: 40.8 kg (89 lb 15.2 oz)  Body mass index is 16.45 kg/m².    Intake/Output Summary (Last 24 hours) at 10/15/2023 0829  Last data filed at 10/15/2023 0600  Gross per 24 hour   Intake 1412.18 ml   Output 2500 ml   Net -1087.82 ml      Physical Exam      Constitutional:       General: She is not in acute distress.     Appearance: She is  ill-appearing.   HENT:      Head: Normocephalic and atraumatic.   Cardiovascular:      Rate and Rhythm: Regular rhythm. Tachycardia present.      Heart sounds: No murmur heard.  Pulmonary:      Breath sounds: Examination of the left-lower field reveals decreased breath sounds. Decreased breath sounds present. No rales.   Abdominal:      General: Abdomen is flat. Bowel sounds are normal.      Palpations: Abdomen is soft.   Musculoskeletal:      Right lower leg: No edema.      Left lower leg: No edema.   Neurological:      General: No focal deficit present.      Motor: Weakness present.     Significant Labs: All pertinent labs within the past 24 hours have been reviewed.  CBC:   Recent Labs   Lab 10/14/23  0523 10/15/23  0433   WBC 26.97* 20.13*   HGB 9.6* 9.9*   HCT 29.6* 30.5*    182     CMP:   Recent Labs   Lab 10/14/23  0523 10/15/23  0433    131*   K 3.8 5.2*   CL 97 92*   CO2 23 25   * 109   BUN 17 58*   CREATININE 2.7* 5.4*   CALCIUM 8.7 8.5*   ALBUMIN 3.8 3.5   ANIONGAP 16 14       Significant Imaging: I have reviewed all pertinent imaging results/findings within the past 24 hours.  I have reviewed and interpreted all pertinent imaging results/findings within the past 24 hours.    Assessment/Plan:      Active Diagnoses:    Diagnosis Date Noted POA    PRINCIPAL PROBLEM:  Acute on chronic respiratory failure with hypoxia [J96.21] 03/05/2018 Yes    Hyperkalemia [E87.5] 09/30/2023 Yes    Community acquired pneumonia of left lower lobe of lung [J18.9] 09/29/2023 Yes    Goals of care, counseling/discussion [Z71.89] 09/29/2023 Not Applicable    Chronic obstructive pulmonary disease with acute exacerbation [J44.1]  Yes    ESRD (end stage renal disease) on dialysis [N18.6, Z99.2] 02/19/2020 Not Applicable     Chronic    Chronic respiratory failure with hypoxia, on home O2 therapy [J96.11, Z99.81] 07/18/2019 Not Applicable     Chronic    Shortness of breath [R06.02] 07/17/2019 Yes      Problems  Resolved During this Admission:     VTE Risk Mitigation (From admission, onward)           Ordered     heparin (porcine) injection 5,000 Units  Every 12 hours         10/12/23 1406     IP VTE HIGH RISK PATIENT  Once         10/12/23 1406     Place sequential compression device  Until discontinued         10/12/23 1406     Place sequential compression device  Until discontinued         10/12/23 1406     heparin (porcine) injection 4,100 Units  As needed (PRN)         10/12/23 1406                                [facility] Medicine  Progress Note     Patient Name: Katie Quevedo  MRN: [id number]  Patient Class: IP- Inpatient     Admission Date: 10/12/2023  Length of Stay: 2 days  Attending Physician: Sonu Martínez MD  Primary Care Provider: Kingston Verduzco MD           Subjective:      Principal Problem:<principal problem not specified>     Katie Quevedo is a 78 y.o.  female who  has a past medical history of Acute congestive heart failure (02/10/2020), Anemia, Bilateral renal cysts, Cataract, Chronic LBP (7/26/2012), Chronic pain, CKD (chronic kidney disease), stage IV, Colon polyp (2013), COPD (chronic obstructive pulmonary disease), Dehydration, Encounter for blood transfusion, HTN (hypertension), Lumbar spondylosis, Melanoma, Metabolic bone disease, Migraines, neuralgic, Osteoporosis, Primary osteoarthritis of both knees, Pulmonary embolism with infarction, Seizures (1972), Subdeltoid bursitis, L>R. (9/27/2012), Ulcer, and Vitamin D deficiency disease.. The patient presented to [facility] on 10/12/2023 with a primary complaint of Shortness of Breath (Pt presents to the ED for sob. Pt presents on a neb mask at 8lpm .  Pt has been hospitalized recently for pneumonia)     The patient was in their usual state of health until 2 days ago when she was discharge from [facility] for respiratory failure 2/2 pneumonia, COPD. She has had 2 other hospital admissions in the last 2 weeks.  She was barely able to finish her HD session yesterday. Spoke with Dr. Montemayor, she has been struggling to complete HD sessions over the last 6 weeks and has becoming more and more frail and tachycardic on the machine and there is some concern she will not be able to continue with dialysis. She asked to come to the ER today for worsening shortness of breath. She does not have BiPAP at home. Daughter has not observed any issues with swallowing or episodes concerning for aspiration. She is on chronic opiates, unclear if patient has been more sedated than usual at home.     Interval History: no acute event overnight. Continued Levophed. Hypotensive, appear septic. Leukocytosis and on IV Abx, Awaiting cx. On 3 L O2 NC.      Review of Systems        Constitutional:  Positive for malaise/fatigue.   Respiratory:  Positive for shortness of breath.    Cardiovascular:  Negative for chest pain and leg swelling.   Genitourinary:  Negative for dysuria, frequency and urgency.   Musculoskeletal:  Positive for back pain.   Neurological:  Positive for weakness. Negative for dizziness, tingling and headaches.   Psychiatric/Behavioral:  Negative for depression and memory loss.       Objective:      Vital Signs (Most Recent):  Temp: 97.9 °F (36.6 °C) (10/14/23 0715)  Pulse: 103 (10/14/23 0800)  Resp: (!) 28 (10/14/23 0804)  BP: (!) 95/45 (10/14/23 0800)  SpO2: 100 % (10/14/23 0800) Vital Signs (24h Range):  Temp:  [97.4 °F (36.3 °C)-97.9 °F (36.6 °C)] 97.9 °F (36.6 °C)  Pulse:  [] 103  Resp:  [16-38] 28  SpO2:  [93 %-100 %] 100 %  BP: ()/(33-96) 95/45      Weight: 40.8 kg (89 lb 15.2 oz)  Body mass index is 16.45 kg/m².     Intake/Output Summary (Last 24 hours) at 10/14/2023 0812  Last data filed at 10/14/2023 0600      Gross per 24 hour   Intake 1014.8 ml   Output 3500 ml   Net -2485.2 ml      Physical Exam     Constitutional:       General: She is not in acute distress.     Appearance: She is ill-appearing.   HENT:       Head: Normocephalic and atraumatic.   Cardiovascular:      Rate and Rhythm: Regular rhythm. Tachycardia present.      Heart sounds: No murmur heard.  Pulmonary:      Breath sounds: Examination of the left-lower field reveals decreased breath sounds. Decreased breath sounds present. No rales.   Abdominal:      General: Abdomen is flat. Bowel sounds are normal.      Palpations: Abdomen is soft.   Musculoskeletal:      Right lower leg: No edema.      Left lower leg: No edema.   Neurological:      General: No focal deficit present.      Motor: Weakness present.            Significant Labs: All pertinent labs within the past 24 hours have been reviewed.  CBC:          Recent Labs   Lab 10/12/23  1232 10/12/23  1248 10/13/23  0341 10/14/23  0523   WBC 16.24*  --  7.80 26.97*   HGB 11.9*  --  9.7* 9.6*   HCT 37.1 35.8* 30.6* 29.6*     --  159 291      CMP:         Recent Labs   Lab 10/12/23  1232 10/13/23  0341 10/14/23  0523    127* 136   K 4.6 5.4* 3.8    96 97   CO2 23 17* 23   GLU 98 132* 113*   BUN 36* 55* 17   CREATININE 4.3* 5.3* 2.7*   CALCIUM 9.1 8.0* 8.7   ALBUMIN  --  2.7* 3.8   ANIONGAP 16 14 16         Significant Imaging: I have reviewed all pertinent imaging results/findings within the past 24 hours.  I have reviewed and interpreted all pertinent imaging results/findings within the past 24 hours.     Assessment/Plan:      There are no hospital problems to display for this patient.     VTE Risk Mitigation (From admission, onward)              Ordered       heparin (porcine) injection 5,000 Units  Every 12 hours         10/12/23 1406       IP VTE HIGH RISK PATIENT  Once         10/12/23 1406       Place sequential compression device  Until discontinued         10/12/23 1406       Place sequential compression device  Until discontinued         10/12/23 1406       heparin (porcine) injection 4,100 Units  As needed (PRN)         10/12/23 1406                    Assessment and  plan:  --------------------------------     Acute Hypoxic and Hypercapnic Respiratory Failure, Volume Overload  Multiple admissions for PNA and SOB   CXR Grossly stable appearing cardiopulmonary findings noting pleural effusions and bilateral basilar subsegmental atelectasis.  Developing left lower lung zone consolidation not excluded.    Started on BiPAP and weaned to 1 L NC  Procal 1.44, elevated from prior  Stop vancomycin  Pulm and Nephro feel this is more due to volume overload due to issues receiving dialysis and fluid removal, L lung findings are less likely recurrent pneumonia  Multiple thoracenteses in the past  Treat with ceftriaxone and azithromycin for now  Resp cx, BCx  Request home BiPAP/Trelegy    LLL Pneumonia  -on Rocephin IV and azithro  -WBC trending down     Multiple Recent Hospital Admissions  Discussed recently decline in health with patient's daughter  Patient is a DNR  Brought up Palliative consult to daughter, she will consider it     Elevated Troponin  0.17-->repeat ordered  EKG without BERKLEY, changes from prior noted     ESRD on HD  Consult Nephrology  Spoke with Dr. Montemayor, she has been struggling to complete HD sessions over the last 6 weeks and has becoming more and more frail and tachycardic on the machine and there is some concern she will not be able to continue with dialysis  Also said it was okay to place a midline  HD today     Chronic Opiate Use  Will continue     DVT PPx: Heparin              Critical care time spent on the evaluation and treatment of severe organ dysfunction, review of pertinent labs and imaging studies, discussions with consulting providers and discussions with patient/family: 30 minutes.     Sonu Martínez MD  Department of Hospital Medicine   [place] - Intensive Care

## 2023-10-16 LAB
ALBUMIN SERPL BCP-MCNC: 3.4 G/DL (ref 3.5–5.2)
ANION GAP SERPL CALC-SCNC: 17 MMOL/L (ref 8–16)
BACTERIA SPEC AEROBE CULT: NORMAL
BACTERIA SPEC AEROBE CULT: NORMAL
BASOPHILS # BLD AUTO: 0.03 K/UL (ref 0–0.2)
BASOPHILS NFR BLD: 0.2 % (ref 0–1.9)
BUN SERPL-MCNC: 85 MG/DL (ref 8–23)
CALCIUM SERPL-MCNC: 8.5 MG/DL (ref 8.7–10.5)
CHLORIDE SERPL-SCNC: 88 MMOL/L (ref 95–110)
CO2 SERPL-SCNC: 19 MMOL/L (ref 23–29)
CREAT SERPL-MCNC: 6.9 MG/DL (ref 0.5–1.4)
DIFFERENTIAL METHOD: ABNORMAL
EOSINOPHIL # BLD AUTO: 0 K/UL (ref 0–0.5)
EOSINOPHIL NFR BLD: 0.1 % (ref 0–8)
ERYTHROCYTE [DISTWIDTH] IN BLOOD BY AUTOMATED COUNT: 15.1 % (ref 11.5–14.5)
EST. GFR  (NO RACE VARIABLE): 6 ML/MIN/1.73 M^2
GLUCOSE SERPL-MCNC: 100 MG/DL (ref 70–110)
GRAM STN SPEC: NORMAL
HCT VFR BLD AUTO: 29.8 % (ref 37–48.5)
HGB BLD-MCNC: 9.5 G/DL (ref 12–16)
IMM GRANULOCYTES # BLD AUTO: 0.7 K/UL (ref 0–0.04)
IMM GRANULOCYTES NFR BLD AUTO: 4.2 % (ref 0–0.5)
LYMPHOCYTES # BLD AUTO: 1.7 K/UL (ref 1–4.8)
LYMPHOCYTES NFR BLD: 9.8 % (ref 18–48)
MCH RBC QN AUTO: 32.8 PG (ref 27–31)
MCHC RBC AUTO-ENTMCNC: 31.9 G/DL (ref 32–36)
MCV RBC AUTO: 103 FL (ref 82–98)
MONOCYTES # BLD AUTO: 1.2 K/UL (ref 0.3–1)
MONOCYTES NFR BLD: 7 % (ref 4–15)
NEUTROPHILS # BLD AUTO: 13.2 K/UL (ref 1.8–7.7)
NEUTROPHILS NFR BLD: 78.7 % (ref 38–73)
NRBC BLD-RTO: 0 /100 WBC
PHOSPHATE SERPL-MCNC: 4.8 MG/DL (ref 2.7–4.5)
PLATELET # BLD AUTO: 174 K/UL (ref 150–450)
PMV BLD AUTO: 10.1 FL (ref 9.2–12.9)
POTASSIUM SERPL-SCNC: 6.6 MMOL/L (ref 3.5–5.1)
RBC # BLD AUTO: 2.9 M/UL (ref 4–5.4)
SODIUM SERPL-SCNC: 124 MMOL/L (ref 136–145)
WBC # BLD AUTO: 16.8 K/UL (ref 3.9–12.7)

## 2023-10-16 PROCEDURE — 63600175 PHARM REV CODE 636 W HCPCS: Mod: HCNC | Performed by: FAMILY MEDICINE

## 2023-10-16 PROCEDURE — 99498 ADVNCD CARE PLAN ADDL 30 MIN: CPT | Mod: HCNC,,, | Performed by: STUDENT IN AN ORGANIZED HEALTH CARE EDUCATION/TRAINING PROGRAM

## 2023-10-16 PROCEDURE — 80069 RENAL FUNCTION PANEL: CPT | Mod: HCNC | Performed by: FAMILY MEDICINE

## 2023-10-16 PROCEDURE — 94640 AIRWAY INHALATION TREATMENT: CPT | Mod: HCNC

## 2023-10-16 PROCEDURE — 85025 COMPLETE CBC W/AUTO DIFF WBC: CPT | Mod: HCNC | Performed by: FAMILY MEDICINE

## 2023-10-16 PROCEDURE — 27000221 HC OXYGEN, UP TO 24 HOURS: Mod: HCNC

## 2023-10-16 PROCEDURE — 94761 N-INVAS EAR/PLS OXIMETRY MLT: CPT | Mod: HCNC

## 2023-10-16 PROCEDURE — 99498 PR ADVNCD CARE PLAN ADDL 30 MIN: ICD-10-PCS | Mod: HCNC,,, | Performed by: STUDENT IN AN ORGANIZED HEALTH CARE EDUCATION/TRAINING PROGRAM

## 2023-10-16 PROCEDURE — 25000003 PHARM REV CODE 250: Mod: HCNC | Performed by: STUDENT IN AN ORGANIZED HEALTH CARE EDUCATION/TRAINING PROGRAM

## 2023-10-16 PROCEDURE — 25000003 PHARM REV CODE 250: Mod: HCNC | Performed by: FAMILY MEDICINE

## 2023-10-16 PROCEDURE — 1153F DOC ADVNCD DIS CMFRT NOT 1ST: CPT | Mod: HCNC,CPTII,, | Performed by: STUDENT IN AN ORGANIZED HEALTH CARE EDUCATION/TRAINING PROGRAM

## 2023-10-16 PROCEDURE — 99223 1ST HOSP IP/OBS HIGH 75: CPT | Mod: HCNC,25,, | Performed by: STUDENT IN AN ORGANIZED HEALTH CARE EDUCATION/TRAINING PROGRAM

## 2023-10-16 PROCEDURE — 99900035 HC TECH TIME PER 15 MIN (STAT): Mod: HCNC

## 2023-10-16 PROCEDURE — 1158F ADVNC CARE PLAN TLK DOCD: CPT | Mod: HCNC,CPTII,, | Performed by: STUDENT IN AN ORGANIZED HEALTH CARE EDUCATION/TRAINING PROGRAM

## 2023-10-16 PROCEDURE — 99497 ADVNCD CARE PLAN 30 MIN: CPT | Mod: HCNC,25,, | Performed by: STUDENT IN AN ORGANIZED HEALTH CARE EDUCATION/TRAINING PROGRAM

## 2023-10-16 PROCEDURE — 1153F PR DOC ADVANCED DISEASE DX, GOAL OF CARE DO NOT PRIORITIZE COMFORT: ICD-10-PCS | Mod: HCNC,CPTII,, | Performed by: STUDENT IN AN ORGANIZED HEALTH CARE EDUCATION/TRAINING PROGRAM

## 2023-10-16 PROCEDURE — 25000242 PHARM REV CODE 250 ALT 637 W/ HCPCS: Mod: HCNC | Performed by: FAMILY MEDICINE

## 2023-10-16 PROCEDURE — 97110 THERAPEUTIC EXERCISES: CPT | Mod: HCNC

## 2023-10-16 PROCEDURE — 99223 PR INITIAL HOSPITAL CARE,LEVL III: ICD-10-PCS | Mod: HCNC,25,, | Performed by: STUDENT IN AN ORGANIZED HEALTH CARE EDUCATION/TRAINING PROGRAM

## 2023-10-16 PROCEDURE — 80100014 HC HEMODIALYSIS 1:1: Mod: HCNC

## 2023-10-16 PROCEDURE — 94660 CPAP INITIATION&MGMT: CPT | Mod: HCNC

## 2023-10-16 PROCEDURE — 36415 COLL VENOUS BLD VENIPUNCTURE: CPT | Mod: HCNC | Performed by: FAMILY MEDICINE

## 2023-10-16 PROCEDURE — 97530 THERAPEUTIC ACTIVITIES: CPT | Mod: HCNC

## 2023-10-16 PROCEDURE — 99497 PR ADVNCD CARE PLAN 30 MIN: ICD-10-PCS | Mod: HCNC,25,, | Performed by: STUDENT IN AN ORGANIZED HEALTH CARE EDUCATION/TRAINING PROGRAM

## 2023-10-16 PROCEDURE — 21400001 HC TELEMETRY ROOM: Mod: HCNC

## 2023-10-16 PROCEDURE — 1158F PR ADVANCE CARE PLANNING DISCUSS DOCUMENTED IN MEDICAL RECORD: ICD-10-PCS | Mod: HCNC,CPTII,, | Performed by: STUDENT IN AN ORGANIZED HEALTH CARE EDUCATION/TRAINING PROGRAM

## 2023-10-16 RX ORDER — CETIRIZINE HYDROCHLORIDE 5 MG/1
5 TABLET ORAL DAILY
Status: DISCONTINUED | OUTPATIENT
Start: 2023-10-16 | End: 2023-10-16

## 2023-10-16 RX ADMIN — BUSPIRONE HYDROCHLORIDE 10 MG: 5 TABLET ORAL at 08:10

## 2023-10-16 RX ADMIN — IPRATROPIUM BROMIDE AND ALBUTEROL SULFATE 3 ML: 2.5; .5 SOLUTION RESPIRATORY (INHALATION) at 11:10

## 2023-10-16 RX ADMIN — CEFTRIAXONE SODIUM 2 G: 2 INJECTION, POWDER, FOR SOLUTION INTRAMUSCULAR; INTRAVENOUS at 03:10

## 2023-10-16 RX ADMIN — HYDROCODONE BITARTRATE AND ACETAMINOPHEN 1 TABLET: 10; 325 TABLET ORAL at 09:10

## 2023-10-16 RX ADMIN — HYDROCODONE BITARTRATE AND ACETAMINOPHEN 1 TABLET: 10; 325 TABLET ORAL at 12:10

## 2023-10-16 RX ADMIN — HYDROCODONE BITARTRATE AND ACETAMINOPHEN 1 TABLET: 10; 325 TABLET ORAL at 04:10

## 2023-10-16 RX ADMIN — Medication 6 MG: at 08:10

## 2023-10-16 RX ADMIN — HEPARIN SODIUM 5000 UNITS: 5000 INJECTION INTRAVENOUS; SUBCUTANEOUS at 08:10

## 2023-10-16 RX ADMIN — NEPHROCAP 1 CAPSULE: 1 CAP ORAL at 08:10

## 2023-10-16 RX ADMIN — PREDNISONE 40 MG: 20 TABLET ORAL at 08:10

## 2023-10-16 RX ADMIN — MIRTAZAPINE 7.5 MG: 7.5 TABLET ORAL at 08:10

## 2023-10-16 RX ADMIN — IPRATROPIUM BROMIDE AND ALBUTEROL SULFATE 3 ML: 2.5; .5 SOLUTION RESPIRATORY (INHALATION) at 03:10

## 2023-10-16 RX ADMIN — FLUTICASONE FUROATE AND VILANTEROL TRIFENATATE 1 PUFF: 200; 25 POWDER RESPIRATORY (INHALATION) at 07:10

## 2023-10-16 RX ADMIN — HYDROXYZINE HYDROCHLORIDE 25 MG: 25 TABLET ORAL at 09:10

## 2023-10-16 RX ADMIN — LORAZEPAM 0.5 MG: 0.5 TABLET ORAL at 08:10

## 2023-10-16 RX ADMIN — IPRATROPIUM BROMIDE AND ALBUTEROL SULFATE 3 ML: 2.5; .5 SOLUTION RESPIRATORY (INHALATION) at 07:10

## 2023-10-16 RX ADMIN — ACETAMINOPHEN 325MG 650 MG: 325 TABLET ORAL at 02:10

## 2023-10-16 RX ADMIN — SEVELAMER CARBONATE 800 MG: 800 TABLET, FILM COATED ORAL at 04:10

## 2023-10-16 RX ADMIN — MIDODRINE HYDROCHLORIDE 15 MG: 5 TABLET ORAL at 12:10

## 2023-10-16 RX ADMIN — LORAZEPAM 0.5 MG: 0.5 TABLET ORAL at 02:10

## 2023-10-16 RX ADMIN — MEGESTROL ACETATE 20 MG: 20 TABLET ORAL at 08:10

## 2023-10-16 RX ADMIN — SEVELAMER CARBONATE 800 MG: 800 TABLET, FILM COATED ORAL at 12:10

## 2023-10-16 RX ADMIN — MIDODRINE HYDROCHLORIDE 15 MG: 5 TABLET ORAL at 04:10

## 2023-10-16 RX ADMIN — SEVELAMER CARBONATE 800 MG: 800 TABLET, FILM COATED ORAL at 08:10

## 2023-10-16 RX ADMIN — EPOETIN ALFA-EPBX 10000 UNITS: 10000 INJECTION, SOLUTION INTRAVENOUS; SUBCUTANEOUS at 08:10

## 2023-10-16 RX ADMIN — MIDODRINE HYDROCHLORIDE 15 MG: 5 TABLET ORAL at 08:10

## 2023-10-16 RX ADMIN — IPRATROPIUM BROMIDE AND ALBUTEROL SULFATE 3 ML: 2.5; .5 SOLUTION RESPIRATORY (INHALATION) at 04:10

## 2023-10-16 RX ADMIN — SODIUM ZIRCONIUM CYCLOSILICATE 10 G: 5 POWDER, FOR SUSPENSION ORAL at 05:10

## 2023-10-16 NOTE — EICU
Intervention Initiated From:  Bedside    Francisco intervened regarding:  Medication    Nurse Notified:  No    Doctor Notified:  Yes    Comments: Telephone call received from bedside RN requesting med for nausea. Message relayed to Dr. Greenberg.

## 2023-10-16 NOTE — PT/OT/SLP PROGRESS
Physical Therapy Treatment    Patient Name:  Katie Quevedo   MRN:  [id number]    Recommendations:     Discharge Recommendations: Low Intensity Therapy  Discharge Equipment Recommendations: bedside commode  Barriers to discharge:  impaired endurance    Assessment:     Katie Quevedo is a 78 y.o. female admitted with a medical diagnosis of Acute on chronic respiratory failure with hypoxia.  She presents with the following impairments/functional limitations: weakness, gait instability, impaired balance, impaired endurance, impaired self care skills, impaired functional mobility, impaired cardiopulmonary response to activity .Pt sat EOB ~12 min with CGA initially then requiring Konrad for sitting balance due to decreased endurance and for increased comfort.  bpm at rest and nursing cleared pt for activity while maintaining HR<140 bpm. Pt performed 1 sit >stand and HR increased to 145 bpm. Pt returned supine and HR returned to baseline. Pt reports her breathing feels better this date.    Rehab Prognosis: Fair; patient would benefit from acute skilled PT services to address these deficits and reach maximum level of function.    Recent Surgery: * No surgery found *      Plan:     During this hospitalization, patient to be seen 5 x/week to address the identified rehab impairments via gait training, therapeutic activities, therapeutic exercises, neuromuscular re-education and progress toward the following goals:    Plan of Care Expires:  11/13/23    Subjective     Chief Complaint: weakness due to pt seen post dialysis   Patient/Family Comments/goals: pt reports her breathing feels better today  Pain/Comfort:  Pain Rating 1: 0/10      Objective:     Communicated with nsg prior to session.  Patient found HOB elevated with blood pressure cuff, telemetry, pulse ox (continuous), PureWick, oxygen (ICU monitoring) upon PT entry to room.     General Precautions: Standard, fall, respiratory  Orthopedic Precautions: N/A  Braces:  N/A  Respiratory Status: Nasal cannula, flow 2 L/min     Functional Mobility:  Bed Mobility:     Scooting: contact guard assistance and minimum assistance  Supine to Sit: minimum assistance  Sit to Supine: contact guard assistance and minimum assistance  Transfers:     Sit to Stand:  minimum assistance and of 2 persons with hand-held assist  Gait: Pt took 1 side step L toward HOB with Konrad x 2 and returned seated due to elevated HR      AM-PAC 6 CLICK MOBILITY  Turning over in bed (including adjusting bedclothes, sheets and blankets)?: 3  Sitting down on and standing up from a chair with arms (e.g., wheelchair, bedside commode, etc.): 3  Moving from lying on back to sitting on the side of the bed?: 3  Moving to and from a bed to a chair (including a wheelchair)?: 3  Need to walk in hospital room?: 3  Climbing 3-5 steps with a railing?: 1  Basic Mobility Total Score: 16       Treatment & Education:  Pt sat EOB ~12 min with CGA initially then requiring Konrad for sitting balance due to decreased endurance and for increased comfort.    bpm at rest and nursing cleared pt for activity while maintaining HR<140 bpm.   One therapist assisting pt with sitting balance while other therapist instructing pt on X 10 ankle pumps and X 5 reps UE/LE therex: LAQs and shoulder flexion  Increased rest breaks and PLB in between  HR in the high 130s with therex  Pt performed 1 sit >stand and HR increased to 145 bpm.   Pt returned supine and HR returned to baseline.  SpO2 >92% during session on 2 L  BLE/UE elevated and propped on pillows with heels floated    Patient left HOB elevated with all lines intact, call button in reach, nsg notified, and spouse present..    GOALS:   Multidisciplinary Problems       Physical Therapy Goals          Problem: Physical Therapy    Goal Priority Disciplines Outcome Goal Variances Interventions   Physical Therapy Goal     PT, PT/OT Ongoing, Progressing     Description: Goals to be met by: 11/13/23      Patient will increase functional independence with mobility by performin. Sit to stand transfer with Supervision  2. Bed to chair transfer with Supervision using RW or rollator  3. Gait  x 50 feet with Supervision using rollator or RW.   4. Lower extremity exercise program x10 reps per handout, with independence                         Time Tracking:     PT Received On: 10/16/23  PT Start Time: 1350     PT Stop Time: 1413  PT Total Time (min): 23 min with OT    Billable Minutes: Therapeutic Activity 13 and Therapeutic Exercise 10    Treatment Type: Treatment  PT/PTA: PT     Number of PTA visits since last PT visit: 0     10/16/2023

## 2023-10-16 NOTE — PLAN OF CARE
Problem: Physical Therapy  Goal: Physical Therapy Goal  Description: Goals to be met by: 23     Patient will increase functional independence with mobility by performin. Sit to stand transfer with Supervision  2. Bed to chair transfer with Supervision using RW or rollator  3. Gait  x 50 feet with Supervision using rollator or RW.   4. Lower extremity exercise program x10 reps per handout, with independence    Outcome: Ongoing, Progressing     Pt sat EOB ~12 min with CGA initially then requiring Konrad for sitting balance due to decreased endurance and for increased comfort.  bpm at rest and nursing cleared pt for activity while maintaining HR<140 bpm. Pt performed 1 sit >stand and HR increased to 145 bpm. Pt returned supine and HR returned to baseline. Pt reports her breathing feels better this date.

## 2023-10-16 NOTE — HOSPITAL COURSE
Pt offf from levophed and now midodrine is increased. Dialysis per nephro. Palliative consulted and now pt is a DNR.   10/17: 1.5L removed. Complains of cramps after.   10/18: pt was unresponsive on dialysis for 15 min with hypotension. Pt now alert and oriented. Will plan for family meeting tomorrow to discuss goals of care  10/19: Long discussion with goals of care. Pt and family still wants aggressive care. Would like to try dialysis without any benzos or opioids on board which could precipitate the hypotension leading to the unresponsive episode. U/a and cxr repeated to rule out infection per family friend's request  10/20: WBCS trended down to normal. U/a pending. CXR unchanged. Pt underwent HD today without benzos. After 90 minutes of HD, pt became unresponsive with barely palpable pulses ,not able to record BP, dialysis stopped.Rinsed back with BP improved and patient regained  consciousness. Unfortunately patient is not tolerating dialysis and will no longer be able to receive dialysis. To focus on comfort. Ancticipate D/c in am with hospice.   As of 10/23/2023, patient and family are asking about home dialysis. Case management sent referral to [facility]. She was seen by \[facility]\"" Nephrology, she is certainly appropriate candidate for hospice.   10/24 Rechecking labs, K stable. Plan for meeting in the am

## 2023-10-16 NOTE — PT/OT/SLP PROGRESS
Occupational Therapy   Treatment    Name: Katie Quevedo  MRN: [id number]  Admitting Diagnosis:  Acute on chronic respiratory failure with hypoxia       Recommendations:     Discharge Recommendations: Low Intensity Therapy  Discharge Equipment Recommendations:  bedside commode  Barriers to discharge:  Other (Comment) (Pt requires increased level of assistance)    Assessment:     Katie Quevedo is a 78 y.o. female with a medical diagnosis of Acute on chronic respiratory failure with hypoxia.  She presents with The primary encounter diagnosis was Chronic obstructive pulmonary disease with acute exacerbation. Diagnoses of Shortness of breath, Dyspnea, Acute chest pain, SOB (shortness of breath), COPD exacerbation, ESRD (end stage renal disease) on dialysis, and Influenza A virus present were also pertinent to this visit. Performance deficits affecting function are weakness, gait instability, impaired endurance, impaired cardiopulmonary response to activity, impaired functional mobility, impaired self care skills, impaired balance.     Pt progressing towards goals, limited this date by increased HR with ax & decreased endurance. Pt sitting EOB with Konrad for sitting balance. Pt performing one sit<>stand with Konrad x2 & B HHA.     Rehab Prognosis:  Fair; patient would benefit from acute skilled OT services to address these deficits and reach maximum level of function.       Plan:     Patient to be seen 5 x/week to address the above listed problems via self-care/home management, therapeutic activities, therapeutic exercises  Plan of Care Expires: 11/13/23  Plan of Care Reviewed with: patient, spouse    Subjective     Chief Complaint: anxious   Patient/Family Comments/goals: agreeable to therapy  Pain/Comfort:  Pain Rating 1: 0/10    Objective:     Communicated with: nsg prior to session.  Patient found HOB elevated with blood pressure cuff, telemetry, pulse ox (continuous), PureWick, oxygen upon OT entry to room.    General  Precautions: Standard, fall, respiratory    Orthopedic Precautions:N/A  Braces: N/A  Respiratory Status: Nasal cannula, flow 2 L/min     Occupational Performance:     Bed Mobility:    Patient completed Scooting/Bridging with contact guard assistance and minimum assistance  Patient completed Supine to Sit with minimum assistance  Patient completed Sit to Supine with contact guard assistance and minimum assistance     Functional Mobility/Transfers:  Patient completed Sit <> Stand Transfer with minimum assistance and of 2 persons  with  B HHA & use of step stool 2/2 small stature & increased height of bed in ICU   Functional Mobility: Pt taking 1 side step to L with Konrad x2 & B HHA, returned to sitting 2/2 increased HR    [facility] 6 Click ADL: 16    Treatment & Education:  Pt progressing towards goals, limited this date by increased HR with ax & decreased endurance.   Pt performing bed mobility as above.   Pt sitting EOB with Konrad for sitting balance.  Pt performing limited BUE/BLE exs as able seated EOB while monitoring vital signs.    Pt performing one sit<>stand with Konrad x2 & B HHA.   HR up to 145 after stand & side step; pt returned to supine & HR back to 135 at rest.  SpO2 >92% during session on 2 L.  End of session .    Patient left HOB elevated with all lines intact, call button in reach, nsg notified, and spouse present    GOALS:   Multidisciplinary Problems       Occupational Therapy Goals          Problem: Occupational Therapy    Goal Priority Disciplines Outcome Interventions   Occupational Therapy Goal     OT, PT/OT Ongoing, Progressing    Description: Goals to be met by: 11/13/2023     Patient will increase functional independence with ADLs by performing:    Grooming while seated with Set-up Assistance.  Toileting from bedside commode with Stand-by Assistance for hygiene and clothing management.   Rolling to Bilateral with Modified [place].   Supine to sit with Modified [place].  Step  transfer with Stand-by Assistance & appropriate AD.  Toilet transfer to bedside commode with Stand-by Assistance & appropriate AD.                         Time Tracking:     OT Date of Treatment: 10/16/23  OT Start Time: 1355  OT Stop Time: 1413  OT Total Time (min): 18 min    Billable Minutes:Therapeutic Activity 18 w/PT    OT/PAMELA: OT     Number of PAMELA visits since last OT visit: 0    10/16/2023

## 2023-10-16 NOTE — ASSESSMENT & PLAN NOTE
Patient with Hypoxic Respiratory failure which is Acute on chronic.  she is not on home oxygen. Supplemental oxygen was provided and noted- Oxygen Concentration (%):  [28] 28    .   Signs/symptoms of respiratory failure include- respiratory distress. Contributing diagnoses includes - penumonia.  Labs and images were reviewed. Patient Has recent ABG, which has been reviewed. Will treat underlying causes and adjust management of respiratory failure as follows    Multiple admissions for PNA and SOB   CXR Grossly stable appearing cardiopulmonary findings noting pleural effusions and bilateral basilar subsegmental atelectasis.  Developing left lower lung zone consolidation not excluded.    Started on BiPAP and weaned to 1 L NC  Procal 1.44, elevated from prior  Stop vancomycin  Pulm and Nephro feel this is more due to volume overload due to issues receiving dialysis and fluid removal, L lung findings are less likely recurrent pneumonia  Multiple thoracenteses in the past  Treat with ceftriaxone and azithromycin for now  Resp cx, BCx  Request home BiPAP/Trelegy

## 2023-10-16 NOTE — PROCEDURES
"Patient seen and examined on HD tolerating well. No complains. Still SOB so far tolerating UF well goal 3L   BP (!) 150/70   Pulse (!) 117   Temp 98 °F (36.7 °C) (Oral)   Resp (!) 34   Ht 5' 2" (1.575 m)   Wt 40.8 kg (89 lb 15.2 oz)   LMP  (LMP Unknown)   SpO2 98%   BMI 16.45 kg/m²     With any question please call answering service [phone]  Ramo Da Silva MD    [facility]   NEIL Diggs MD,   MD CURTIS Benoit MD E. V. Harmon, NP    [address]  [place], [address]  "

## 2023-10-16 NOTE — SUBJECTIVE & OBJECTIVE
Interval History: still having sob    Review of Systems   All other systems reviewed and are negative.    Objective:     Vital Signs (Most Recent):  Temp: 97.6 °F (36.4 °C) (10/16/23 1115)  Pulse: (!) 120 (10/16/23 1646)  Resp: (!) 30 (10/16/23 1646)  BP: (!) 127/59 (10/16/23 1646)  SpO2: (!) 94 % (10/16/23 1646) Vital Signs (24h Range):  Temp:  [97.6 °F (36.4 °C)-98 °F (36.7 °C)] 97.6 °F (36.4 °C)  Pulse:  [] 120  Resp:  [16-46] 30  SpO2:  [79 %-100 %] 94 %  BP: ()/() 127/59     Weight: 40.8 kg (89 lb 15.2 oz)  Body mass index is 16.45 kg/m².    Intake/Output Summary (Last 24 hours) at 10/16/2023 1658  Last data filed at 10/16/2023 0600  Gross per 24 hour   Intake 684.42 ml   Output 150 ml   Net 534.42 ml         Physical Exam  Vitals and nursing note reviewed.   Constitutional:       Appearance: She is well-developed.   HENT:      Head: Normocephalic and atraumatic.      Nose: Nose normal.   Eyes:      Conjunctiva/sclera: Conjunctivae normal.   Cardiovascular:      Rate and Rhythm: Normal rate and regular rhythm.      Heart sounds: Normal heart sounds.   Pulmonary:      Effort: Pulmonary effort is normal.      Breath sounds: Normal breath sounds. No wheezing.   Abdominal:      General: Bowel sounds are normal.      Palpations: Abdomen is soft. There is no mass.      Tenderness: There is no abdominal tenderness. There is no guarding or rebound.   Musculoskeletal:         General: No tenderness. Normal range of motion.      Cervical back: Normal range of motion and neck supple.   Skin:     General: Skin is warm.      Findings: No rash.   Neurological:      Mental Status: She is alert and oriented to person, place, and time.      Cranial Nerves: No cranial nerve deficit.   Psychiatric:         Behavior: Behavior normal.         Thought Content: Thought content normal.             Significant Labs: All pertinent labs within the past 24 hours have been reviewed.  BMP:   Recent Labs   Lab  10/16/23  0359      *   K 6.6*   CL 88*   CO2 19*   BUN 85*   CREATININE 6.9*   CALCIUM 8.5*     CBC:   Recent Labs   Lab 10/15/23  0433 10/16/23  0359   WBC 20.13* 16.80*   HGB 9.9* 9.5*   HCT 30.5* 29.8*    174       Significant Imaging: I have reviewed all pertinent imaging results/findings within the past 24 hours.  I have reviewed and interpreted all pertinent imaging results/findings within the past 24 hours.

## 2023-10-16 NOTE — PROGRESS NOTES
[facility]  Progress Note    Patient Name: Katie Quevedo  MRN: [id number]  Patient Class: IP- Inpatient   Admission Date: 10/12/2023  Length of Stay: 4 days  Attending Physician: Domi Pretty MD  Primary Care Provider: Kingston Verduzco MD        Subjective:     Principal Problem:Acute on chronic respiratory failure with hypoxia        HPI:  Katie Quevedo is a 78 y.o.  female who  has a past medical history of Acute congestive heart failure (02/10/2020), Anemia, Bilateral renal cysts, Cataract, Chronic LBP (7/26/2012), Chronic pain, CKD (chronic kidney disease), stage IV, Colon polyp (2013), COPD (chronic obstructive pulmonary disease), Dehydration, Encounter for blood transfusion, HTN (hypertension), Lumbar spondylosis, Melanoma, Metabolic bone disease, Migraines, neuralgic, Osteoporosis, Primary osteoarthritis of both knees, Pulmonary embolism with infarction, Seizures (1972), Subdeltoid bursitis, L>R. (9/27/2012), Ulcer, and Vitamin D deficiency disease.. The patient presented to [facility] on 10/12/2023 with a primary complaint of Shortness of Breath (Pt presents to the ED for sob. Pt presents on a neb mask at 8lpm .  Pt has been hospitalized recently for pneumonia)     The patient was in their usual state of health until 2 days ago when she was discharge from [facility] for respiratory failure 2/2 pneumonia, COPD. She has had 2 other hospital admissions in the last 2 weeks. She was barely able to finish her HD session yesterday. Spoke with Dr. Montemayor, she has been struggling to complete HD sessions over the last 6 weeks and has becoming more and more frail and tachycardic on the machine and there is some concern she will not be able to continue with dialysis. She asked to come to the ER today for worsening shortness of breath. She does not have BiPAP at home. Daughter has not observed any issues with swallowing or episodes concerning for aspiration. She  is on chronic opiates, unclear if patient has been more sedated than usual at home.      Overview/Hospital Course:  Pt offf from levophed and now midodrine is increased. Dialysis per nephro. Palliative consulted and now pt is a DNR.       Interval History: still having sob    Review of Systems   All other systems reviewed and are negative.    Objective:     Vital Signs (Most Recent):  Temp: 97.6 °F (36.4 °C) (10/16/23 1115)  Pulse: (!) 120 (10/16/23 1646)  Resp: (!) 30 (10/16/23 1646)  BP: (!) 127/59 (10/16/23 1646)  SpO2: (!) 94 % (10/16/23 1646) Vital Signs (24h Range):  Temp:  [97.6 °F (36.4 °C)-98 °F (36.7 °C)] 97.6 °F (36.4 °C)  Pulse:  [] 120  Resp:  [16-46] 30  SpO2:  [79 %-100 %] 94 %  BP: ()/() 127/59     Weight: 40.8 kg (89 lb 15.2 oz)  Body mass index is 16.45 kg/m².    Intake/Output Summary (Last 24 hours) at 10/16/2023 1658  Last data filed at 10/16/2023 0600  Gross per 24 hour   Intake 684.42 ml   Output 150 ml   Net 534.42 ml         Physical Exam  Vitals and nursing note reviewed.   Constitutional:       Appearance: She is well-developed.   HENT:      Head: Normocephalic and atraumatic.      Nose: Nose normal.   Eyes:      Conjunctiva/sclera: Conjunctivae normal.   Cardiovascular:      Rate and Rhythm: Normal rate and regular rhythm.      Heart sounds: Normal heart sounds.   Pulmonary:      Effort: Pulmonary effort is normal.      Breath sounds: Normal breath sounds. No wheezing.   Abdominal:      General: Bowel sounds are normal.      Palpations: Abdomen is soft. There is no mass.      Tenderness: There is no abdominal tenderness. There is no guarding or rebound.   Musculoskeletal:         General: No tenderness. Normal range of motion.      Cervical back: Normal range of motion and neck supple.   Skin:     General: Skin is warm.      Findings: No rash.   Neurological:      Mental Status: She is alert and oriented to person, place, and time.      Cranial Nerves: No cranial nerve  deficit.   Psychiatric:         Behavior: Behavior normal.         Thought Content: Thought content normal.             Significant Labs: All pertinent labs within the past 24 hours have been reviewed.  BMP:   Recent Labs   Lab 10/16/23  0359      *   K 6.6*   CL 88*   CO2 19*   BUN 85*   CREATININE 6.9*   CALCIUM 8.5*     CBC:   Recent Labs   Lab 10/15/23  0433 10/16/23  0359   WBC 20.13* 16.80*   HGB 9.9* 9.5*   HCT 30.5* 29.8*    174       Significant Imaging: I have reviewed all pertinent imaging results/findings within the past 24 hours.  I have reviewed and interpreted all pertinent imaging results/findings within the past 24 hours.      Assessment/Plan:      * Acute on chronic respiratory failure with hypoxia  Patient with Hypoxic Respiratory failure which is Acute on chronic.  she is not on home oxygen. Supplemental oxygen was provided and noted- Oxygen Concentration (%):  [28] 28    .   Signs/symptoms of respiratory failure include- respiratory distress. Contributing diagnoses includes - penumonia.  Labs and images were reviewed. Patient Has recent ABG, which has been reviewed. Will treat underlying causes and adjust management of respiratory failure as follows    Multiple admissions for PNA and SOB   CXR Grossly stable appearing cardiopulmonary findings noting pleural effusions and bilateral basilar subsegmental atelectasis.  Developing left lower lung zone consolidation not excluded.    Started on BiPAP and weaned to 1 L NC  Procal 1.44, elevated from prior  Stop vancomycin  Pulm and Nephro feel this is more due to volume overload due to issues receiving dialysis and fluid removal, L lung findings are less likely recurrent pneumonia  Multiple thoracenteses in the past  Treat with ceftriaxone and azithromycin for now  Resp cx, BCx  Request home BiPAP/Trelegy    Goals of care, counseling/discussion  Advance Care Planning     Code Status  In light of the patients advanced and life  limiting illness,I engaged the the patient in a voluntary conversation about the patient's preferences for care  at the very end of life. The patient wishes to have a natural, peaceful death.  Along those lines, the patient does not wish to have CPR or other invasive treatments performed when her heart and/or breathing stops. I communicated to the patient that a DNR form was completed and will be scanned into EPIC.        Community acquired pneumonia of left lower lobe of lung  Cont iv abx      ESRD (end stage renal disease) on dialysis   Nephrology on board  she has been struggling to complete HD sessions over the last 6 weeks and has becoming more and more frail and tachycardic on the machine and there is some concern she will not be able to continue with dialysis  Also said it was okay to place a midline  HD per nephro       VTE Risk Mitigation (From admission, onward)         Ordered     heparin (porcine) injection 5,000 Units  Every 12 hours         10/12/23 1406     IP VTE HIGH RISK PATIENT  Once         10/12/23 1406     Place sequential compression device  Until discontinued         10/12/23 1406     Place sequential compression device  Until discontinued         10/12/23 1406     heparin (porcine) injection 4,100 Units  As needed (PRN)         10/12/23 1406                Discharge Planning   LAITH:      Code Status: DNR   Is the patient medically ready for discharge?:     Reason for patient still in hospital (select all that apply): Treatment  Discharge Plan A: Home Health            Critical care time spent on the evaluation and treatment of severe organ dysfunction, review of pertinent labs and imaging studies, discussions with consulting providers and discussions with patient/family: >30 minutes.      Domi Pretty MD  Department of Hospital Medicine   [place] - Intensive Care

## 2023-10-16 NOTE — PLAN OF CARE
Problem: Adult Inpatient Plan of Care  Goal: Plan of Care Review  Outcome: Ongoing, Progressing   -V/S remained stable through out shift   -Pt received dialysis today, 2.5L taken off   -pt remained off levo throughout dialysis and after      Problem: Adult Inpatient Plan of Care  Goal: Optimal Comfort and Wellbeing  Outcome: Ongoing, Progressing  -pt received ativan for anxiety of dialysis  -pt received hydrocodone for pain x2  -pt received hydroxyzine for itching  -up with PT/OT       Problem: Infection Progression (Sepsis/Septic Shock)  Goal: Absence of Infection Signs and Symptoms  Outcome: Ongoing, Progressing  -pt received rocephine

## 2023-10-16 NOTE — CONSULTS
Consult Note  Palliative Care    Consult Requested By: Domi Pretty MD  Reason for Consult: Goals of care    SUBJECTIVE:     History of Present Illness:  Disease Process: Advanced Respiratory Disease, End Stage Renal Disease    78F with PMH of HTN, HLD, VTE, melanoma in remission, ESRD on HD x 4.5 years with 3 recent hospital admissions for CAP and its sequelae:    9/29/23: admitted for CAP following 3 days of sputum change and SOB at rest limiting HD. PCP had ordered abx and steroids as outpt, continued as inpt with improvement and pt was discharged to home on 10/1 with home health svcs and pulm / pulm rehab referrals.    10/8/23: Readmitted for two days of SOB, found desatting to high 80s at home on 3LNC, did not respond to nebs in ED but significantly improved with NIV. Discharged to home on 10/9 after her Monday HD.    10/12/23 (current admission):   - Readmitted for SOB unresponsive to nebs at home with CXR showing persistent hypervolemia with effusions and compressive atalectasis. Procal > 1.5, covered for CAP. Started on NIV and weaned to 1LNC, admitted to ICU for emergent HD. Primary nephro contacted and reported concern pt would not be able to resume routine HD given recent requirement for premedication with benzos leading to lethargy and hypotension.    Interval hospital course    10/13: Per PCCM condition felt far more likely 2/2 ESRD than COPD. Recommended for home Trilogy volume ventilator. Midline placed. 3L fluid removed still overloaded on exam. Started on levophed overnight for hypotension.    10/14: Midodrine added, lasix stopped, pre-HD benzos held per nephro.    10/15: Weaned off pressors.    10/16: Palliative has been consulted for goals of care.    At time of interview pt is AAOx3 in NAD, pale and severely cachectic but pleasant and lucid with intact cognition and good health literacy. Her  Dre is at bedside and supportive. Her chief complaint is chronic back pain for which she  takes Norco at home (ordered as inpt appropriately). Pt has robust private hire home sitters 5 days/week who now transport pt to HD using her 's car.    Pt reports HD has been dramatically more difficult to tolerate over the past two months and understands that she may be at the point at which she can no longer tolerate intermittent HD. Pt has made slight progress over the past few days with addition of midodrine however this is a palliative measure without survival benefit and pt's condition remains overall tenuous.    Pt is well aware that she is a candidate for hospice at any point she is willing to forego HD. At this time she is hopeful her subacute illness will improve sufficiently for her to resume routine HD. We discussed that if pt continues to have brittle volume status and hemodynamic instability due to HD our options are limited essentially to intermediate placement at a facility with HD access (pt and  would NOT accept long term placement) or accepting home hospice care and foregoing further HD.    Pt and spouse are well aware of the concern for HD intolerance and state they have been provided with a contact number for a hospice agency when pt stops HD. Confirmed DNR status and they are agreeable to telemedicine f/u for further counseling on transitioning to comfort care.    Will continue regular clinical updates and if pt continues to fail attempts at full HD while inpt my recommendation will be to at least hold an informational meeting with a hospice RN and maintain contact for anticipatory planning as pt's prognosis appears to be on the order of one month on this poor clinical course.    Confirmed DNR status and reviewed LAPost created for terminal dx of COPD which states limited interventions TO INCLUDE HD.    Past Medical History:   Diagnosis Date    Acute congestive heart failure 02/10/2020    Anemia     Bilateral renal cysts     Cataract     Chronic LBP 7/26/2012    Chronic pain     CKD  (chronic kidney disease), stage IV     Colon polyp 2013    COPD (chronic obstructive pulmonary disease)     Dehydration     Encounter for blood transfusion     HTN (hypertension)     Lumbar spondylosis     Melanoma     of the lip    Metabolic bone disease     Migraines, neuralgic     Osteoporosis     Primary osteoarthritis of both knees     s/p Rt TKA    Pulmonary embolism with infarction     Seizures 1972    x1 only    Subdeltoid bursitis, L>R. 9/27/2012    Ulcer     Vitamin D deficiency disease      Past Surgical History:   Procedure Laterality Date    BLADDER SUSPENSION      CATARACT EXTRACTION  11/18/13    left eye    CERVICAL LAMINECTOMY      x3, fusion x1    COLONOSCOPY  2009    DECLOTTING OF ARTERIOVENOUS GRAFT Left 1/3/2022    Procedure: [id number];  Surgeon: Lindsey Louie MD;  Location: [facility] OR;  Service: Vascular;  Laterality: Left;    DECLOTTING OF ARTERIOVENOUS GRAFT Left 2/8/2022    Procedure: [id number];  Surgeon: Lindsey Louie MD;  Location: [facility] OR;  Service: Vascular;  Laterality: Left;    DECLOTTING OF ARTERIOVENOUS GRAFT Left 4/8/2022    Procedure: [id number];  Surgeon: Lindsey Louie MD;  Location: [facility] OR;  Service: Vascular;  Laterality: Left;    FISTULOGRAM N/A 2/27/2020    Procedure: Fistulogram;  Surgeon: Noe Benitez MD;  Location: [facility] CATH LAB/EP;  Service: Cardiology;  Laterality: N/A;    HYSTERECTOMY      JOINT REPLACEMENT  2001    total right knee     LUMBAR LAMINECTOMY      x 3, fusion x1    OOPHORECTOMY      PERIPHERAL ANGIOGRAPHY N/A 3/9/2020    Procedure: Peripheral angiography;  Surgeon: Jacinto Henriquez MD;  Location: [facility] CATH LAB/EP;  Service: Cardiology;  Laterality: N/A;    PLACEMENT OF ARTERIOVENOUS GRAFT Left 1/21/2020    Procedure: INSERTION, GRAFT, ARTERIOVENOUS;  Surgeon: Lindsey Louie MD;  Location: [facility] OR;  Service: General;  Laterality: Left;    PLACEMENT OF ARTERIOVENOUS GRAFT Right 7/22/2022    Procedure:  INSERTION, GRAFT, ARTERIOVENOUS;  Surgeon: Lindsey Louie MD;  Location: [facility] OR;  Service: General;  Laterality: Right;    PLACEMENT OF DUAL-LUMEN VASCULAR CATHETER Right 4/16/2022    Procedure: INSERTION-CATHETER-RICKI;  Surgeon: Lindsey Louie MD;  Location: [facility] OR;  Service: General;  Laterality: Right;    THROMBECTOMY Left 2/3/2020    Procedure: THROMBECTOMY;  Surgeon: Lindsey Louie MD;  Location: [facility] OR;  Service: General;  Laterality: Left;    THROMBECTOMY  3/9/2020    Procedure: Thrombectomy;  Surgeon: Jacinto Henriquez MD;  Location: [facility] CATH LAB/EP;  Service: Cardiology;;    THROMBECTOMY Left 4/7/2022    Procedure: THROMBECTOMY;  Surgeon: Lindsey Louie MD;  Location: [facility] OR;  Service: General;  Laterality: Left;     Family History   Problem Relation Age of Onset    Arthritis Mother     Stroke Mother     Hypertension Father     Cancer Father     Cataracts Father     Diabetes Maternal Aunt     Hypertension Maternal Grandfather     Heart disease Maternal Grandfather     Heart attack Maternal Grandfather     Cataracts Sister     Glaucoma Cousin      Social History     Tobacco Use    Smoking status: Former     Types: Cigarettes    Smokeless tobacco: Former     Quit date: 2/3/2015   Substance Use Topics    Alcohol use: Not Currently     Comment: Rare    Drug use: No       Mental Status: Oriented x3    ECOG Performance Status Grade: 3 - Confined to bed or chair 50% of waking hours    Review of Systems:  Positive for weakness, fatigue, shortness of breath, chronic low back pain.    Review of Symptoms      Symptom Assessment (ESAS 0-10 Scale)  Pain:  0  Dyspnea:  2  Anxiety:  5  Nausea:  0  Depression:  2  Anorexia:  0  Fatigue:  5  Insomnia:  0  Restlessness:  0  Agitation:  0     CAM / Delirium:  Negative  Constipation:  Negative  Diarrhea:  Negative      ECOG Performance Status ndGndrndanddndend:nd nd2nd Living Arrangements:  Lives in home and Lives with spouse    Psychosocial/Cultural:   See  Palliative Psychosocial Note: No  Social Issues Identified: Coping deficit pt/family  Bereavement Risk: Yes: Close or dependent relationship to the  person  Caregiver Needs Discussed. Caregiver Distress: Yes: Issues of guilt and Intensity of family caregiving  Cultural: No specific concerns  **Primary  to Follow**  Palliative Care  Consult: No    Spiritual:  F - Aleja and Belief:  Holiness  I - Importance:  Moderate  C - Community:  Home Yazdanism  A - Address in Care:  Sacramental visits PRN     Time-Based Charting:  Yes  Chart Review: 30 minutes  Face to Face: 16 minutes  Symptom Assessment: 13 minutes  Coordination of Care: 12 minutes  Discharge Plannin minutes  Advance Care Planning: 10 minutes  Goals of Care: 45 minutes    Total Time Spent: 135 minutes      Advance Care Planning   Advance Directives:   Living Will: Yes        Copy on chart: No    LaPOST: Yes    Do Not Resuscitate Status: Yes    Medical Power of : Yes    Agent's Name:  Dre Quevedo (spouse)   Agent's Contact Number:  [phone]    Decision Making:  Patient answered questions and Family answered questions  Goals of Care: What is most important right now is to focus on spending time at home, avoiding the hospital, remaining as independent as possible, improvement in condition but with limits to invasive therapies. Accordingly, we have decided that the best plan to meet the patient's goals includes continuing with treatment.         OBJECTIVE:     Physical Exam  Constitutional:       General: She is not in acute distress.     Appearance: She is cachectic. She is ill-appearing. She is not toxic-appearing or diaphoretic.      Interventions: Nasal cannula in place.   HENT:      Head: Normocephalic and atraumatic.      Mouth/Throat:      Mouth: Mucous membranes are moist.      Pharynx: Oropharynx is clear.   Eyes:      Extraocular Movements: Extraocular movements intact.      Pupils: Pupils are equal, round,  and reactive to light.   Cardiovascular:      Rate and Rhythm: Tachycardia present. Rhythm irregular.      Pulses: Normal pulses.      Heart sounds: Murmur (3/6 blowing systolic - mitral) heard.      Comments: Frequent extrasystoles  Pulmonary:      Effort: Pulmonary effort is normal. No respiratory distress.      Breath sounds: No wheezing or rales.   Abdominal:      General: Abdomen is flat. There is no distension.      Palpations: Abdomen is soft.      Tenderness: There is no abdominal tenderness.   Musculoskeletal:         General: No swelling, tenderness or deformity. Normal range of motion.      Right lower leg: No edema.      Left lower leg: No edema.   Skin:     General: Skin is warm and dry.      Capillary Refill: Capillary refill takes less than 2 seconds.      Coloration: Skin is pale.   Neurological:      General: No focal deficit present.      Mental Status: She is alert and oriented to person, place, and time. Mental status is at baseline.      Cranial Nerves: No cranial nerve deficit.      Sensory: No sensory deficit.      Motor: Weakness present.   Psychiatric:         Attention and Perception: Attention and perception normal.         Mood and Affect: Mood and affect normal.         Speech: Speech normal.         Behavior: Behavior normal.         Thought Content: Thought content normal.         Cognition and Memory: Cognition and memory normal.         Judgment: Judgment normal.       ASSESSMENT/PLAN:     78F with PMH of HTN, HLD, VTE, melanoma in remission, ESRD on HD x 4.5 years with subacute significant decline in HD tolerance and more acutely multiple admissions for hypotension precluding HD I/s/o recent tx for CAP. Palliative consulted for goals of care.    Pt's HD tolerance is acutely dwindling and her nephrologist has advised her of concern that pt may no longer be a candidate. Her current course is tenuous, likely to be stepped down with transient improvement in BP with midodrine, but long  term prognosis will remain poor on the order of weeks to two months. Pt accepts her ESRD is life limiting and would never wish for FPC placement. If pt has further hemodynamic instability limiting full HD will discuss in depth planning to discharge to home off HD.     If pt is able to resume iHD at discharge we will f/u in 1-2 weeks from palliative clinic for an update on her condition at home and  further on threshholds for transition to hospice.    Please place a referral to outpt palliative clinic for a new pt appt post discharge.    Recommendations:  Medical: per nephro and PCCM  Symptom Management: c/w home [place] for HD; agree with holding further benzos  Psychosocial: no specific concerns, well supported by spouse, intact mentation  Legal: spouse holds unshared HCPOA  Prognosis: 2-8 weeks for ESRD rapidly losing HD tolerance and qualified for hospice at any point she is prepared to forego further HD; no other concretely life limiting dx at this time    Nicolas Ramirez MD  Hospice and Palliative Medicine  Palliative Care Pager: [phone]    Advance Care Planning     Date: 10/16/2023    Living Will  During this visit, I engaged the patient  in the voluntary advance care planning process.  The patient and I reviewed the role for advance directives and their purpose in directing future healthcare if the patient's unable to speak for him/herself.  At this point in time, the patient does have full decision-making capacity.  We discussed different extreme health states that she could experience, and reviewed what kind of medical care she would want in those situations.  The patient communicated that if she were comatose and had little chance of a meaningful recovery, she would not want machines/life-sustaining treatments used. In addition to the above preference, other important end-of-life issues for the patient include discontinue HD when no longer beneficial. The patient has completed a living  will to reflect these preferences and The patient has already designated a healthcare power of  to make decisions on [unfilled] behalf.  I spent a total of 55 minutes engaging the patient in this advance care planning discussion.          Power of   I initiated the process of voluntary advance care planning today and explained the importance of this process to the patient.  I introduced the concept of advance directives to the patient, as well. Then the patient received detailed information about the importance of designating a Health Care Power of  (HCPOA). She was also instructed to communicate with this person about their wishes for future healthcare, should she become sick and lose decision-making capacity. The patient has previously appointed a HCPOA, her spouse, health care agent: Dre Quevedo & health care agent number: [phone] I spent a total time of 55 minutes discussing this issue with the patient.         Code Status  In light of the patients advanced and life limiting illness,I engaged the the patient and family in a voluntary conversation about the patient's preferences for care  at the very end of life. The patient wishes to have a natural, peaceful death.  Along those lines, the patient does not wish to have CPR or other invasive treatments performed when her heart and/or breathing stops. I communicated to the patient and family that a DNR order would be placed in her medical record to reflect this preference.  I spent a total of 55 minutes engaging the patient in this advance care planning discussion.       [facility]  I engaged the patient and family in a voluntary conversation about advance care planning and we specifically addressed what the goals of care would be moving forward, in light of the patient's change in clinical status, specifically ESRD with subacutely worsening HD intolerance.  We did not specifically address the patient's likely prognosis, which is poor.  We  explored the patient's values and preferences for future care.  The patient and family endorses that what is most important right now is to focus on spending time at home, avoiding the hospital, remaining as independent as possible, and improvement in condition but with limits to invasive therapies    Accordingly, we have decided that the best plan to meet the patient's goals includes no further escalation in treatment    I did explain the role for hospice care at this stage of the patient's illness, including its ability to help the patient live with the best quality of life possible.  We will not be making a hospice referral at this time.    I spent a total of 55 minutes engaging the patient in this advance care planning discussion.

## 2023-10-16 NOTE — PROGRESS NOTES
[facility] Progress Note    Subjective:      No acute events overnight. Patient reports feeling like she is losing her voice. Reports itchiness all around. K+ levels butch to 6.6, received 10g Lokelma. She is currently undergoing HD. Off of Levophed. Reports continued dyspnea. Denies chest pain, palpitations.      Objective:     Last 24 Hour Vital Signs:  BP  Min: 85/48  Max: 184/136  Temp  Av.8 °F (36.6 °C)  Min: 97.6 °F (36.4 °C)  Max: 98 °F (36.7 °C)  Pulse  Av  Min: 85  Max: 143  Resp  Av  Min: 16  Max: 46  SpO2  Av.9 %  Min: 79 %  Max: 100 %  I/O last 3 completed shifts:  In: 1880.9 [P.O.:900; I.V.:40.7; IV Piggyback:940.2]  Out: 2650 [Urine:150; Other:2500]    Physical Examination:  Physical Exam  HENT:      Head: Normocephalic.      Nose: No rhinorrhea.   Eyes:      General: No scleral icterus.     Extraocular Movements: Extraocular movements intact.      Pupils: Pupils are equal, round, and reactive to light.   Cardiovascular:      Rate and Rhythm: Normal rate and regular rhythm.      Pulses: Normal pulses.      Heart sounds: Normal heart sounds.   Pulmonary:      Effort: Pulmonary effort is normal. No respiratory distress.      Breath sounds: No rhonchi.      Comments: Decreased breath sounds.   Chest:      Chest wall: No tenderness.   Abdominal:      General: Abdomen is flat. Bowel sounds are normal.      Palpations: Abdomen is soft.      Tenderness: There is no abdominal tenderness. There is no guarding or rebound.   Musculoskeletal:         General: No swelling, tenderness or signs of injury.      Right lower leg: No edema.      Left lower leg: No edema.   Skin:     Coloration: Skin is not jaundiced.   Neurological:      General: No focal deficit present.      Mental Status: She is alert and oriented to person, place, and time.      Motor: No weakness.   Psychiatric:         Mood and Affect: Mood normal.         Behavior: Behavior normal.             Laboratory:  Trended Lab Data:  Recent Labs     10/14/23  0523 10/15/23  0433 10/16/23  0359   WBC 26.97* 20.13* 16.80*   HGB 9.6* 9.9* 9.5*   HCT 29.6* 30.5* 29.8*    182 174    131* 124*   K 3.8 5.2* 6.6*   CL 97 92* 88*   CO2 23 25 19*   BUN 17 58* 85*   CREATININE 2.7* 5.4* 6.9*   * 109 100   CALCIUM 8.7 8.5* 8.5*   ALBUMIN 3.8 3.5 3.4*   PHOS 3.2 3.5 4.8*       Cardiac:   Recent Labs   Lab 10/12/23  1232 10/12/23  1836   TROPONINI 0.172* 0.134*   *  --        Urinalysis:       Microbiology:  Microbiology Results (last 7 days)       Procedure Component Value Units Date/Time    Culture, Respiratory with Gram Stain [[id number]] Collected: 10/12/23 2013    Order Status: Completed Specimen: Respiratory from Sputum, Expectorated Updated: 10/16/23 0859     Respiratory Culture Normal respiratory milena      No S aureus or Pseudomonas isolated.     Gram Stain (Respiratory) <10 epithelial cells per low power field.     Gram Stain (Respiratory) No WBC's     Gram Stain (Respiratory) Moderate Gram positive cocci    Blood culture (site 1) [[id number]] Collected: 10/12/23 1552    Order Status: Completed Specimen: Blood from Antecubital, Right Hand Updated: 10/16/23 0612     Blood Culture, Routine No Growth to date      No Growth to date      No Growth to date      No Growth to date    Narrative:      Site # 1, aerobic and anaerobic    Blood culture (site 2) [[id number]] Collected: 10/12/23 1552    Order Status: Completed Specimen: Blood Updated: 10/16/23 0612     Blood Culture, Routine No Growth to date      No Growth to date      No Growth to date      No Growth to date    Narrative:      Site # 2, aerobic only    Respiratory Infection Panel (PCR), Nasopharyngeal [[id number]] Collected: 10/12/23 1804    Order Status: Completed Specimen: Nasopharyngeal Swab Updated: 10/13/23 0208     Respiratory Infection Panel Source NP Swab     Adenovirus Not Detected     Coronavirus 229E, Common Cold Virus  Not Detected     Coronavirus HKU1, Common Cold Virus Not Detected     Coronavirus NL63, Common Cold Virus Not Detected     Coronavirus OC43, Common Cold Virus Not Detected     Comment: The Coronavirus strains detected in this test cause the common cold.  These strains are not the COVID-19 (novel Coronavirus)strain   associated with the respiratory disease outbreak.          SARS-CoV2 (COVID-19) Qualitative PCR Not Detected     Human Metapneumovirus Not Detected     Human Rhinovirus/Enterovirus Not Detected     Influenza A (subtypes H1, H1-2009,H3) Not Detected     Influenza B Not Detected     Parainfluenza Virus 1 Not Detected     Parainfluenza Virus 2 Not Detected     Parainfluenza Virus 3 Not Detected     Parainfluenza Virus 4 Not Detected     Respiratory Syncytial Virus Not Detected     Bordetella Parapertussis (QR0384) Not Detected     Bordetella pertussis (ptxP) Not Detected     Chlamydia pneumoniae Not Detected     Mycoplasma pneumoniae Not Detected    Narrative:      For all other respiratory sources, order [id number] -  Respiratory Viral Panel by PCR    Influenza A & B by Molecular [[id number]] Collected: 10/12/23 2005    Order Status: Completed Specimen: Nasopharyngeal Swab Updated: 10/12/23 2033     Influenza A, Molecular Negative     Influenza B, Molecular Negative     Flu A & B Source Nasal swab            Radiology:  CXR: pleural effusions and bibasilar subsegmental atelectasis.  Possible consolidation LLL, although seen in prior CXR. Interstitial edema vs chronic change.    CTA chest: Negative for acute PE. Small area of consolidation/para pneumonic effusion in LLL. Fibrosis/emphysema noted diffusely.     I have personally reviewed the above labs and imaging.    Current Medications:     Infusions:         Scheduled:   albuterol-ipratropium  3 mL Nebulization Q4H    busPIRone  10 mg Oral Once per day on Mon Wed Fri    cefTRIAXone (ROCEPHIN) IVPB  2 g Intravenous Q24H    epoetin hemant-epbx  10,000 Units  Subcutaneous Every Mon, Wed, Fri    fluticasone furoate-vilanteroL  1 puff Inhalation Daily    furosemide  80 mg Oral Every Tues, Thurs, Sat    heparin (porcine)  5,000 Units Subcutaneous Q12H    megestroL  20 mg Oral BID    midodrine  15 mg Oral TID WM    mirtazapine  7.5 mg Oral QHS    predniSONE  40 mg Oral Daily    sevelamer carbonate  800 mg Oral TID WM    vitamin renal formula (B-complex-vitamin c-folic acid)  1 capsule Oral Daily        PRN:  sodium chloride 0.9%, acetaminophen, albumin human 25%, albuterol sulfate, carbamide peroxide, heparin (porcine), HYDROcodone-acetaminophen, hydrOXYzine HCL, LORazepam, melatonin, ondansetron, sodium chloride 0.9%, sodium chloride 0.9%, sodium chloride 0.9%, sodium chloride 0.9%, sodium chloride 0.9%, sodium chloride 0.9%    Assessment:     Katie Quevedo is a 78 y.o.female with  Patient Active Problem List    Diagnosis Date Noted    Hyperkalemia 09/30/2023    Community acquired pneumonia of left lower lobe of lung 09/29/2023    Goals of care, counseling/discussion 09/29/2023    Sepsis 09/29/2023    Benzodiazepine dependence, continuous 08/18/2023    Chronic obstructive pulmonary disease with acute exacerbation     Coagulation defect, unspecified 04/25/2023    Unspecified mood (affective) disorder 04/25/2023    Aortic atherosclerosis 12/15/2022    Physical deconditioning 12/10/2022    Hypokalemia 12/09/2022    Adjustment reaction with anxiety and depression 11/01/2022    Advance care planning 10/18/2022    severe stage 4 COPD 10/13/2022    Hypotension 10/13/2022    Influenza A virus present 10/11/2022    Clotted dialysis access 01/03/2022    Secondary hyperparathyroidism 03/30/2021    Orthopnea 11/10/2020    SOB (shortness of breath) 11/09/2020    Acute on chronic heart failure 11/09/2020    Pleural effusion 11/09/2020    Medication management 05/28/2020    Dialysis AV fistula malfunction, sequela 03/09/2020    Diarrhea 02/28/2020    Palliative care encounter 02/26/2020     ESRD (end stage renal disease) on dialysis 02/19/2020    Diastolic dysfunction, left ventricle 02/04/2020    Clotted renal dialysis arteriovenous graft 02/03/2020    Nausea 01/14/2020    Pulmonary cachexia due to COPD 12/18/2019    Chronic diastolic heart failure 10/30/2019    Lipoma of torso 10/10/2019    Chronic respiratory failure with hypoxia, on home O2 therapy 07/18/2019    Shortness of breath 07/17/2019    Pericardial effusion 07/05/2019    Pleural effusion, left     Acute on chronic respiratory failure with hypoxia 03/05/2018    History of colon polyps 02/06/2018    Atrophy of left kidney 01/25/2018    Kidney cysts 01/25/2018    Iron deficiency anemia 01/22/2018    Essential hypertension 01/22/2018    Osteoporosis 09/21/2015    Osteoarthritis, hip, bilateral 10/27/2014    Lumbar radiculopathy, BLE 10/27/2014    Cervical radiculopathy, BUE 10/27/2014    Biceps tendonitis 01/06/2014    Rotator cuff tear, right 10/18/2013    Nuclear sclerosis - Both Eyes 08/08/2013    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 Alert and oriented x4.     CVS  #Hypotension  Likely in the setting of longstanding dialysis and vasoplegia.  - Continue midodrine 15 TID  - off pressors. Mostly required it for dialysis, but MAPs are good today. Agree with holding benzos prior to dialysis to avoid worsening hypotension.     #HTN: Antihypertensives held in setting of hypotension. Avoid over diuresing.     #CHF(HFpEF): fluid restriction, strict I's/o's. Low sodium diet. TTE showed concentric remodeling, normal EF 65%.   - . EKG negative for acute ischemic changes.    - Furosemide BID, avoid in setting of hypotension.     CHF:   PULM  #COPD exacerbation vs #volume overload 2/2 HD intolerance  Chest xray : pleural effusions and bilateral basilar subsegmental atelectasis. Developing left lower lung zone consolidation not excluded  CTA: negative for PE. Significant for small area of consolidation and parapneumonic effusion in the left lower lobe.  Correlate for area of chronic atelectasis and effusion versus pneumonia  - continue CAP coverage due to increased risk of infection 2/2 COPD. Blood cultures NGTD. Respiratory cx Gram + cocci  . However, volume overload thought to be likely etiology of recurring dyspnea presentation.   - Continue duonebs Q4H. Prednisone 40 mg QID.       GI  No acute issues. Continue cardiac diet.     RENAL   #ESRD on HD mwf. Receiving dialysis today. Midodrine continued at 15 TID. Lasix to be held on days of dialysis, per nephrology. Concerns for HD toleration, as patient becomes hypotensive and tachycardic with each session limiting fluid removal. Which Is likely etiology for frequent presentation for dyspnea. Minimal UOP with lasix.   - continue MWF HD. Epo injection as needed with HD.  - QID renal function panel  - avoid renal toxic drugs, renally dose meds   - Sevelamer TID.   - Closely monitor electrolytes k, mg, phos  - Hydralazine PRN for itching    ENDOCRINE  Accu-chex. Goal 140-180 glucose.    HEME/ONC  #Macrocytic anemia  Folate and B-12 within normal range.   CTM.Transfuse <7.   VTE prophylaxis:     ID  See PULM.     PSYCH   #Anxiety. Continue buspirone as scheduled.     Dispo: Overall, improved from her acute ICU needs, tolerated HD well today. Can step her down if MAPs remain stable. Concerned about overarching declining health. ICU team has spoken with daughter.    Thank you for allowing us to participate in the care of this patient, we will continue to follow. Please let us know if there are questions or concerns.      De Sanderson,  DO  [facility], PGY-1    Pt seen and examined with Pulmonary/Critical Care team and this note was reviewed and validated with the following additional comments: so far tolerating HD with only oral midodrine.  Will need close monitoring since it is often near the end of her session when hypotension develops.  Discussed goals of care with pt and Dr. Da Silva since pt is becoming more frail.  If she is unable to tolerate dialysis without parenteral vasopressors, a palliative care approach would be most appropriate.  Pt expressed understanding.    Critical Care time was spent validating the history and physical exam, reviewing the lab and imaging results, and discussing the care of the patient with the bedside nurse and the patient and/or surrogates. This critical care time did not overlap with that of any other provider or involve time for any procedures.  This patient has a high probability of sudden clinically significant deterioration which requires the highest level of physician preparedness to intervene urgently. I managed/supervised life or organ supporting interventions that required frequent physician assessments. I devoted my full attention in the ICU to the direct care of this patient for this period of time. Organ systems which are failing and require intensive, critical care support are: renal, cardiovascular.  Critical Care time: 30 minutes    Evan Caban MD  Phone [phone]

## 2023-10-16 NOTE — ASSESSMENT & PLAN NOTE
Nephrology on board  she has been struggling to complete HD sessions over the last 6 weeks and has becoming more and more frail and tachycardic on the machine and there is some concern she will not be able to continue with dialysis  Also said it was okay to place a midline  HD per nephro

## 2023-10-16 NOTE — PLAN OF CARE
No acute events throughout the night. VSS. BP was stable. MAP > 65. Minimal UO this shift: 150 ml. Critical Potassium of 6.6 called in to Dr. Magana. Orders for Lokelma given. Also stated that patient will receive HD again today. No EKG changes noted. Patient denies chest pain. Safety, hygiene, and comfort needs addressed.     Problem: Adult Inpatient Plan of Care  Goal: Plan of Care Review  Outcome: Ongoing, Progressing  Goal: Patient-Specific Goal (Individualized)  Outcome: Ongoing, Progressing  Goal: Absence of Hospital-Acquired Illness or Injury  Outcome: Ongoing, Progressing  Goal: Optimal Comfort and Wellbeing  Outcome: Ongoing, Progressing  Goal: Readiness for Transition of Care  Outcome: Ongoing, Progressing

## 2023-10-16 NOTE — ASSESSMENT & PLAN NOTE
Advance Care Planning     Code Status  In light of the patients advanced and life limiting illness,I engaged the the patient in a voluntary conversation about the patient's preferences for care  at the very end of life. The patient wishes to have a natural, peaceful death.  Along those lines, the patient does not wish to have CPR or other invasive treatments performed when her heart and/or breathing stops. I communicated to the patient that a DNR form was completed and will be scanned into EPIC.

## 2023-10-16 NOTE — HPI
Katie Quevedo is a 78 y.o.  female who  has a past medical history of Acute congestive heart failure (02/10/2020), Anemia, Bilateral renal cysts, Cataract, Chronic LBP (7/26/2012), Chronic pain, CKD (chronic kidney disease), stage IV, Colon polyp (2013), COPD (chronic obstructive pulmonary disease), Dehydration, Encounter for blood transfusion, HTN (hypertension), Lumbar spondylosis, Melanoma, Metabolic bone disease, Migraines, neuralgic, Osteoporosis, Primary osteoarthritis of both knees, Pulmonary embolism with infarction, Seizures (1972), Subdeltoid bursitis, L>R. (9/27/2012), Ulcer, and Vitamin D deficiency disease.. The patient presented to [facility] on 10/12/2023 with a primary complaint of Shortness of Breath (Pt presents to the ED for sob. Pt presents on a neb mask at 8lpm .  Pt has been hospitalized recently for pneumonia)     The patient was in their usual state of health until 2 days ago when she was discharge from [facility] for respiratory failure 2/2 pneumonia, COPD. She has had 2 other hospital admissions in the last 2 weeks. She was barely able to finish her HD session yesterday. Spoke with Dr. Montemayor, she has been struggling to complete HD sessions over the last 6 weeks and has becoming more and more frail and tachycardic on the machine and there is some concern she will not be able to continue with dialysis. She asked to come to the ER today for worsening shortness of breath. She does not have BiPAP at home. Daughter has not observed any issues with swallowing or episodes concerning for aspiration. She is on chronic opiates, unclear if patient has been more sedated than usual at home.

## 2023-10-17 LAB
ALBUMIN SERPL BCP-MCNC: 3.6 G/DL (ref 3.5–5.2)
ANION GAP SERPL CALC-SCNC: 18 MMOL/L (ref 8–16)
BASOPHILS # BLD AUTO: 0.06 K/UL (ref 0–0.2)
BASOPHILS NFR BLD: 0.4 % (ref 0–1.9)
BUN SERPL-MCNC: 33 MG/DL (ref 8–23)
CALCIUM SERPL-MCNC: 8.9 MG/DL (ref 8.7–10.5)
CHLORIDE SERPL-SCNC: 90 MMOL/L (ref 95–110)
CO2 SERPL-SCNC: 23 MMOL/L (ref 23–29)
CREAT SERPL-MCNC: 3.6 MG/DL (ref 0.5–1.4)
DIFFERENTIAL METHOD: ABNORMAL
EOSINOPHIL # BLD AUTO: 0 K/UL (ref 0–0.5)
EOSINOPHIL NFR BLD: 0.2 % (ref 0–8)
ERYTHROCYTE [DISTWIDTH] IN BLOOD BY AUTOMATED COUNT: 15.5 % (ref 11.5–14.5)
EST. GFR  (NO RACE VARIABLE): 12 ML/MIN/1.73 M^2
GLUCOSE SERPL-MCNC: 91 MG/DL (ref 70–110)
HCT VFR BLD AUTO: 33.4 % (ref 37–48.5)
HGB BLD-MCNC: 10.9 G/DL (ref 12–16)
IMM GRANULOCYTES # BLD AUTO: 0.71 K/UL (ref 0–0.04)
IMM GRANULOCYTES NFR BLD AUTO: 4.2 % (ref 0–0.5)
LYMPHOCYTES # BLD AUTO: 1.6 K/UL (ref 1–4.8)
LYMPHOCYTES NFR BLD: 9.2 % (ref 18–48)
MCH RBC QN AUTO: 33.2 PG (ref 27–31)
MCHC RBC AUTO-ENTMCNC: 32.6 G/DL (ref 32–36)
MCV RBC AUTO: 102 FL (ref 82–98)
MONOCYTES # BLD AUTO: 1.4 K/UL (ref 0.3–1)
MONOCYTES NFR BLD: 8 % (ref 4–15)
NEUTROPHILS # BLD AUTO: 13.1 K/UL (ref 1.8–7.7)
NEUTROPHILS NFR BLD: 78 % (ref 38–73)
NRBC BLD-RTO: 1 /100 WBC
PHOSPHATE SERPL-MCNC: 4.6 MG/DL (ref 2.7–4.5)
PLATELET # BLD AUTO: 153 K/UL (ref 150–450)
PMV BLD AUTO: 9.8 FL (ref 9.2–12.9)
POTASSIUM SERPL-SCNC: 4.6 MMOL/L (ref 3.5–5.1)
RBC # BLD AUTO: 3.28 M/UL (ref 4–5.4)
SODIUM SERPL-SCNC: 131 MMOL/L (ref 136–145)
WBC # BLD AUTO: 16.79 K/UL (ref 3.9–12.7)

## 2023-10-17 PROCEDURE — 36415 COLL VENOUS BLD VENIPUNCTURE: CPT | Mod: HCNC | Performed by: FAMILY MEDICINE

## 2023-10-17 PROCEDURE — 94761 N-INVAS EAR/PLS OXIMETRY MLT: CPT | Mod: HCNC

## 2023-10-17 PROCEDURE — 94640 AIRWAY INHALATION TREATMENT: CPT | Mod: HCNC

## 2023-10-17 PROCEDURE — 63600175 PHARM REV CODE 636 W HCPCS: Mod: JZ,JG,HCNC | Performed by: FAMILY MEDICINE

## 2023-10-17 PROCEDURE — 63600175 PHARM REV CODE 636 W HCPCS: Mod: HCNC | Performed by: STUDENT IN AN ORGANIZED HEALTH CARE EDUCATION/TRAINING PROGRAM

## 2023-10-17 PROCEDURE — 25000003 PHARM REV CODE 250: Mod: HCNC | Performed by: FAMILY MEDICINE

## 2023-10-17 PROCEDURE — 25000003 PHARM REV CODE 250: Mod: HCNC | Performed by: STUDENT IN AN ORGANIZED HEALTH CARE EDUCATION/TRAINING PROGRAM

## 2023-10-17 PROCEDURE — P9047 ALBUMIN (HUMAN), 25%, 50ML: HCPCS | Mod: JZ,JG,HCNC | Performed by: FAMILY MEDICINE

## 2023-10-17 PROCEDURE — 63600175 PHARM REV CODE 636 W HCPCS: Mod: HCNC | Performed by: FAMILY MEDICINE

## 2023-10-17 PROCEDURE — 27000221 HC OXYGEN, UP TO 24 HOURS: Mod: HCNC

## 2023-10-17 PROCEDURE — 80100014 HC HEMODIALYSIS 1:1: Mod: HCNC

## 2023-10-17 PROCEDURE — 25000242 PHARM REV CODE 250 ALT 637 W/ HCPCS: Mod: HCNC | Performed by: FAMILY MEDICINE

## 2023-10-17 PROCEDURE — 80069 RENAL FUNCTION PANEL: CPT | Mod: HCNC | Performed by: FAMILY MEDICINE

## 2023-10-17 PROCEDURE — 85025 COMPLETE CBC W/AUTO DIFF WBC: CPT | Mod: HCNC | Performed by: FAMILY MEDICINE

## 2023-10-17 PROCEDURE — 99900035 HC TECH TIME PER 15 MIN (STAT): Mod: HCNC

## 2023-10-17 PROCEDURE — 11000001 HC ACUTE MED/SURG PRIVATE ROOM: Mod: HCNC

## 2023-10-17 RX ORDER — SODIUM CHLORIDE 9 MG/ML
INJECTION, SOLUTION INTRAVENOUS ONCE
Status: COMPLETED | OUTPATIENT
Start: 2023-10-17 | End: 2023-10-17

## 2023-10-17 RX ORDER — MUPIROCIN 20 MG/G
OINTMENT TOPICAL 2 TIMES DAILY
Status: COMPLETED | OUTPATIENT
Start: 2023-10-17 | End: 2023-10-21

## 2023-10-17 RX ORDER — SODIUM CHLORIDE 9 MG/ML
INJECTION, SOLUTION INTRAVENOUS
Status: DISCONTINUED | OUTPATIENT
Start: 2023-10-17 | End: 2023-10-27 | Stop reason: HOSPADM

## 2023-10-17 RX ADMIN — MEGESTROL ACETATE 20 MG: 20 TABLET ORAL at 09:10

## 2023-10-17 RX ADMIN — IPRATROPIUM BROMIDE AND ALBUTEROL SULFATE 3 ML: 2.5; .5 SOLUTION RESPIRATORY (INHALATION) at 12:10

## 2023-10-17 RX ADMIN — MUPIROCIN: 20 OINTMENT TOPICAL at 09:10

## 2023-10-17 RX ADMIN — HYDROCODONE BITARTRATE AND ACETAMINOPHEN 1 TABLET: 10; 325 TABLET ORAL at 09:10

## 2023-10-17 RX ADMIN — HYDROCODONE BITARTRATE AND ACETAMINOPHEN 1 TABLET: 10; 325 TABLET ORAL at 12:10

## 2023-10-17 RX ADMIN — MIRTAZAPINE 7.5 MG: 7.5 TABLET ORAL at 09:10

## 2023-10-17 RX ADMIN — Medication 6 MG: at 09:10

## 2023-10-17 RX ADMIN — HEPARIN SODIUM 5000 UNITS: 5000 INJECTION INTRAVENOUS; SUBCUTANEOUS at 08:10

## 2023-10-17 RX ADMIN — ACETAMINOPHEN 325MG 650 MG: 325 TABLET ORAL at 03:10

## 2023-10-17 RX ADMIN — IPRATROPIUM BROMIDE AND ALBUTEROL SULFATE 3 ML: 2.5; .5 SOLUTION RESPIRATORY (INHALATION) at 03:10

## 2023-10-17 RX ADMIN — MIDODRINE HYDROCHLORIDE 15 MG: 5 TABLET ORAL at 11:10

## 2023-10-17 RX ADMIN — MEGESTROL ACETATE 20 MG: 20 TABLET ORAL at 08:10

## 2023-10-17 RX ADMIN — ONDANSETRON 4 MG: 2 INJECTION INTRAMUSCULAR; INTRAVENOUS at 12:10

## 2023-10-17 RX ADMIN — HYDROCODONE BITARTRATE AND ACETAMINOPHEN 1 TABLET: 10; 325 TABLET ORAL at 04:10

## 2023-10-17 RX ADMIN — MUPIROCIN: 20 OINTMENT TOPICAL at 11:10

## 2023-10-17 RX ADMIN — SEVELAMER CARBONATE 800 MG: 800 TABLET, FILM COATED ORAL at 05:10

## 2023-10-17 RX ADMIN — IPRATROPIUM BROMIDE AND ALBUTEROL SULFATE 3 ML: 2.5; .5 SOLUTION RESPIRATORY (INHALATION) at 07:10

## 2023-10-17 RX ADMIN — NEPHROCAP 1 CAPSULE: 1 CAP ORAL at 08:10

## 2023-10-17 RX ADMIN — SEVELAMER CARBONATE 800 MG: 800 TABLET, FILM COATED ORAL at 08:10

## 2023-10-17 RX ADMIN — HEPARIN SODIUM 5000 UNITS: 5000 INJECTION INTRAVENOUS; SUBCUTANEOUS at 09:10

## 2023-10-17 RX ADMIN — SODIUM CHLORIDE: 9 INJECTION, SOLUTION INTRAVENOUS at 02:10

## 2023-10-17 RX ADMIN — FUROSEMIDE 80 MG: 40 TABLET ORAL at 08:10

## 2023-10-17 RX ADMIN — SEVELAMER CARBONATE 800 MG: 800 TABLET, FILM COATED ORAL at 11:10

## 2023-10-17 RX ADMIN — FLUTICASONE FUROATE AND VILANTEROL TRIFENATATE 1 PUFF: 200; 25 POWDER RESPIRATORY (INHALATION) at 07:10

## 2023-10-17 RX ADMIN — MIDODRINE HYDROCHLORIDE 15 MG: 5 TABLET ORAL at 08:10

## 2023-10-17 RX ADMIN — HEPARIN SODIUM 4100 UNITS: 1000 INJECTION, SOLUTION INTRAVENOUS; SUBCUTANEOUS at 02:10

## 2023-10-17 RX ADMIN — IPRATROPIUM BROMIDE AND ALBUTEROL SULFATE 3 ML: 2.5; .5 SOLUTION RESPIRATORY (INHALATION) at 08:10

## 2023-10-17 RX ADMIN — ALBUMIN (HUMAN) 25 G: 12.5 SOLUTION INTRAVENOUS at 02:10

## 2023-10-17 RX ADMIN — MIDODRINE HYDROCHLORIDE 15 MG: 5 TABLET ORAL at 04:10

## 2023-10-17 NOTE — PT/OT/SLP PROGRESS
Occupational Therapy      Patient Name:  Katie Quevedo   MRN:  [id number]    Patient not seen today secondary to Other (Comment) (ultrafiltration). Will follow-up as able.    10/17/2023

## 2023-10-17 NOTE — PT/OT/SLP PROGRESS
Physical Therapy      Patient Name:  Katie Quevedo   MRN:  [id number]    RN reports that pt is getting ready to start Ultrafiltration and the process would take three hours . Will follow-up next tx date.

## 2023-10-17 NOTE — PROGRESS NOTES
10/17/23 1530   Vital Signs   Temp 97.9 °F (36.6 °C)   Temp Source Oral   Pulse 108   Heart Rate Source Monitor   Resp (!) 22   SpO2 98 %   Flow (L/min) 2   Device (Oxygen Therapy) nasal cannula   BP (!) 124/57   MAP (mmHg) 74   BP Location Right leg  (Simultaneous filing. User may not have seen previous data.)   BP Method Automatic  (Simultaneous filing. User may not have seen previous data.)   Patient Position Lying  (Simultaneous filing. User may not have seen previous data.)   Post-Hemodialysis Assessment   Rinseback Volume (mL) 250 mL   Blood Volume Processed (Liters) 0 L   Dialyzer Clearance Lightly streaked   Duration of Treatment 180 minutes   Additional Fluid Intake (mL) 500 mL   Total UF (mL) 2000 mL   Net Fluid Removal 1500   Patient Response to Treatment well   Post-Hemodialysis Comments Blood returned, lines flushed, heparin instilled into cvc, lines capped and clamped

## 2023-10-17 NOTE — PLAN OF CARE
Problem: Nutrition Impaired (Sepsis/Septic Shock)  Goal: Optimal Nutrition Intake  Outcome: Ongoing, Progressing

## 2023-10-17 NOTE — PLAN OF CARE
Problem: Adult Inpatient Plan of Care  Goal: Plan of Care Review  Outcome: Ongoing, Progressing  -Pt stepped down to telemetry.     Pt transferred to Telemetry . Pt transported via bed by this RN and transport tech with portable O2.  Pt's daughter at the bedside. Pt transported with all personal belongings including phone, , purse, and personal portable O2. Pt bed in lowest position, bed alarm on, pt orientated to room, and call light within reach.  Receiving RN @ bedside.

## 2023-10-17 NOTE — CONSULTS
Food & Nutrition  Education    Diet Education: Low Potassium Diet and Fluid restriction  Time Spent: RD remote   Learners: Patient       Nutrition Education provided with handouts:   + Clinical Reference attachments to d/c documents  Low Potassium and fluid restriction    Comments: Patient continues on a cardiac, renal, 1000mL fluid restriction diet.  Patient previously assessment by RD.  Commercial beverages recommended and ordered.  Decreased po intake and chronically underweight with BMI of 16.45.  Patient provided with diet educations. Patient's daughter not available at this time. On site RD to follow up on diet education at next visit if appropriate.   DNR status with palliative care consult pending.  Labs reviewed.  NKFA.  LBM: 10/13/2022.  I/O since admit: -4163ml.  RD to continue to monitor and follow.   BMP  Lab Results   Component Value Date     (L) 10/17/2023    K 4.6 10/17/2023    CL 90 (L) 10/17/2023    CO2 23 10/17/2023    BUN 33 (H) 10/17/2023    CREATININE 3.6 (H) 10/17/2023    CALCIUM 8.9 10/17/2023    ANIONGAP 18 (H) 10/17/2023    EGFRNORACEVR 12 (A) 10/17/2023     Lab Results   Component Value Date    CALCIUM 8.9 10/17/2023    PHOS 4.6 (H) 10/17/2023     Lab Results   Component Value Date    HGBA1C 5.2 01/22/2018           All questions and concerns answered. Dietitian's contact information provided.       Follow-Up: Yes     Please Re-consult as needed        Thanks!  Carmen Hood, MS, RDN, LDN

## 2023-10-17 NOTE — SUBJECTIVE & OBJECTIVE
Interval History: sob is improved    Review of Systems   All other systems reviewed and are negative.    Objective:     Vital Signs (Most Recent):  Temp: 97.9 °F (36.6 °C) (10/17/23 1530)  Pulse: 106 (10/17/23 1600)  Resp: 20 (10/17/23 1600)  BP: (!) 116/58 (10/17/23 1600)  SpO2: 97 % (10/17/23 1600) Vital Signs (24h Range):  Temp:  [97.7 °F (36.5 °C)-98.2 °F (36.8 °C)] 97.9 °F (36.6 °C)  Pulse:  [] 106  Resp:  [15-54] 20  SpO2:  [91 %-100 %] 97 %  BP: ()/(47-69) 116/58     Weight: 41.5 kg (91 lb 7.9 oz)  Body mass index is 16.73 kg/m².    Intake/Output Summary (Last 24 hours) at 10/17/2023 1645  Last data filed at 10/17/2023 1600  Gross per 24 hour   Intake 1832.34 ml   Output 2000 ml   Net -167.66 ml           Physical Exam  Vitals and nursing note reviewed.   Constitutional:       Appearance: She is well-developed.   HENT:      Head: Normocephalic and atraumatic.      Nose: Nose normal.   Eyes:      Conjunctiva/sclera: Conjunctivae normal.   Cardiovascular:      Rate and Rhythm: Normal rate and regular rhythm.      Heart sounds: Normal heart sounds.   Pulmonary:      Effort: Pulmonary effort is normal.      Breath sounds: Normal breath sounds. No wheezing.   Abdominal:      General: Bowel sounds are normal.      Palpations: Abdomen is soft. There is no mass.      Tenderness: There is no abdominal tenderness. There is no guarding or rebound.   Musculoskeletal:         General: No tenderness. Normal range of motion.      Cervical back: Normal range of motion and neck supple.   Skin:     General: Skin is warm.      Findings: No rash.   Neurological:      Mental Status: She is alert and oriented to person, place, and time.      Cranial Nerves: No cranial nerve deficit.   Psychiatric:         Behavior: Behavior normal.         Thought Content: Thought content normal.             Significant Labs: All pertinent labs within the past 24 hours have been reviewed.  BMP:   Recent Labs   Lab 10/17/23  7837   GLU  91   *   K 4.6   CL 90*   CO2 23   BUN 33*   CREATININE 3.6*   CALCIUM 8.9       CBC:   Recent Labs   Lab 10/16/23  0359 10/17/23  0447   WBC 16.80* 16.79*   HGB 9.5* 10.9*   HCT 29.8* 33.4*    153         Significant Imaging: I have reviewed all pertinent imaging results/findings within the past 24 hours.  I have reviewed and interpreted all pertinent imaging results/findings within the past 24 hours.

## 2023-10-17 NOTE — PROGRESS NOTES
[facility] Medicine  Progress Note    Patient Name: Katie Quevedo  MRN: [id number]  Patient Class: IP- Inpatient   Admission Date: 10/12/2023  Length of Stay: 5 days  Attending Physician: Domi Pretty MD  Primary Care Provider: Kingston Verduzco MD        Subjective:     Principal Problem:Acute on chronic respiratory failure with hypoxia        HPI:  Katie Quevedo is a 78 y.o.  female who  has a past medical history of Acute congestive heart failure (02/10/2020), Anemia, Bilateral renal cysts, Cataract, Chronic LBP (7/26/2012), Chronic pain, CKD (chronic kidney disease), stage IV, Colon polyp (2013), COPD (chronic obstructive pulmonary disease), Dehydration, Encounter for blood transfusion, HTN (hypertension), Lumbar spondylosis, Melanoma, Metabolic bone disease, Migraines, neuralgic, Osteoporosis, Primary osteoarthritis of both knees, Pulmonary embolism with infarction, Seizures (1972), Subdeltoid bursitis, L>R. (9/27/2012), Ulcer, and Vitamin D deficiency disease.. The patient presented to [facility] Medicine on 10/12/2023 with a primary complaint of Shortness of Breath (Pt presents to the ED for sob. Pt presents on a neb mask at 8lpm .  Pt has been hospitalized recently for pneumonia)     The patient was in their usual state of health until 2 days ago when she was discharge from [facility] for respiratory failure 2/2 pneumonia, COPD. She has had 2 other hospital admissions in the last 2 weeks. She was barely able to finish her HD session yesterday. Spoke with Dr. Montemayor, she has been struggling to complete HD sessions over the last 6 weeks and has becoming more and more frail and tachycardic on the machine and there is some concern she will not be able to continue with dialysis. She asked to come to the ER today for worsening shortness of breath. She does not have BiPAP at home. Daughter has not observed any issues with swallowing or episodes concerning for aspiration. She  is on chronic opiates, unclear if patient has been more sedated than usual at home.      Overview/Hospital Course:  Pt offf from levophed and now midodrine is increased. Dialysis per nephro. Palliative consulted and now pt is a DNR.   10/17: 1.5L removed. Complains of cramps after.       Interval History: sob is improved    Review of Systems   All other systems reviewed and are negative.    Objective:     Vital Signs (Most Recent):  Temp: 97.9 °F (36.6 °C) (10/17/23 1530)  Pulse: 106 (10/17/23 1600)  Resp: 20 (10/17/23 1600)  BP: (!) 116/58 (10/17/23 1600)  SpO2: 97 % (10/17/23 1600) Vital Signs (24h Range):  Temp:  [97.7 °F (36.5 °C)-98.2 °F (36.8 °C)] 97.9 °F (36.6 °C)  Pulse:  [] 106  Resp:  [15-54] 20  SpO2:  [91 %-100 %] 97 %  BP: ()/(47-69) 116/58     Weight: 41.5 kg (91 lb 7.9 oz)  Body mass index is 16.73 kg/m².    Intake/Output Summary (Last 24 hours) at 10/17/2023 1645  Last data filed at 10/17/2023 1600  Gross per 24 hour   Intake 1832.34 ml   Output 2000 ml   Net -167.66 ml           Physical Exam  Vitals and nursing note reviewed.   Constitutional:       Appearance: She is well-developed.   HENT:      Head: Normocephalic and atraumatic.      Nose: Nose normal.   Eyes:      Conjunctiva/sclera: Conjunctivae normal.   Cardiovascular:      Rate and Rhythm: Normal rate and regular rhythm.      Heart sounds: Normal heart sounds.   Pulmonary:      Effort: Pulmonary effort is normal.      Breath sounds: Normal breath sounds. No wheezing.   Abdominal:      General: Bowel sounds are normal.      Palpations: Abdomen is soft. There is no mass.      Tenderness: There is no abdominal tenderness. There is no guarding or rebound.   Musculoskeletal:         General: No tenderness. Normal range of motion.      Cervical back: Normal range of motion and neck supple.   Skin:     General: Skin is warm.      Findings: No rash.   Neurological:      Mental Status: She is alert and oriented to person, place, and  time.      Cranial Nerves: No cranial nerve deficit.   Psychiatric:         Behavior: Behavior normal.         Thought Content: Thought content normal.             Significant Labs: All pertinent labs within the past 24 hours have been reviewed.  BMP:   Recent Labs   Lab 10/17/23  0447   GLU 91   *   K 4.6   CL 90*   CO2 23   BUN 33*   CREATININE 3.6*   CALCIUM 8.9       CBC:   Recent Labs   Lab 10/16/23  0359 10/17/23  0447   WBC 16.80* 16.79*   HGB 9.5* 10.9*   HCT 29.8* 33.4*    153         Significant Imaging: I have reviewed all pertinent imaging results/findings within the past 24 hours.  I have reviewed and interpreted all pertinent imaging results/findings within the past 24 hours.      Assessment/Plan:      * Acute on chronic respiratory failure with hypoxia  Patient with Hypoxic Respiratory failure which is Acute on chronic.  she is not on home oxygen. Supplemental oxygen was provided and noted- Oxygen Concentration (%):  [28] 28    .   Signs/symptoms of respiratory failure include- respiratory distress. Contributing diagnoses includes - penumonia.  Labs and images were reviewed. Patient Has recent ABG, which has been reviewed. Will treat underlying causes and adjust management of respiratory failure as follows    Multiple admissions for PNA and SOB   CXR Grossly stable appearing cardiopulmonary findings noting pleural effusions and bilateral basilar subsegmental atelectasis.  Developing left lower lung zone consolidation not excluded.    Started on BiPAP and weaned to 1 L NC  Procal 1.44, elevated from prior  Stop vancomycin  Pulm and Nephro feel this is more due to volume overload due to issues receiving dialysis and fluid removal, L lung findings are less likely recurrent pneumonia  Multiple thoracenteses in the past  Treat with ceftriaxone and azithromycin for now  Resp cx, BCx  Request home BiPAP/Trelegy    Goals of care, counseling/discussion  Advance Care Planning     Code Status  In  light of the patients advanced and life limiting illness,I engaged the the patient in a voluntary conversation about the patient's preferences for care  at the very end of life. The patient wishes to have a natural, peaceful death.  Along those lines, the patient does not wish to have CPR or other invasive treatments performed when her heart and/or breathing stops. I communicated to the patient that a DNR form was completed and will be scanned into EPIC.        Community acquired pneumonia of left lower lobe of lung  Cont iv abx      ESRD (end stage renal disease) on dialysis   Nephrology on board  she has been struggling to complete HD sessions over the last 6 weeks and has becoming more and more frail and tachycardic on the machine and there is some concern she will not be able to continue with dialysis  Also said it was okay to place a midline  HD per nephro         VTE Risk Mitigation (From admission, onward)         Ordered     heparin (porcine) injection 5,000 Units  Every 12 hours         10/12/23 1406     IP VTE HIGH RISK PATIENT  Once         10/12/23 1406     Place sequential compression device  Until discontinued         10/12/23 1406     Place sequential compression device  Until discontinued         10/12/23 1406     heparin (porcine) injection 4,100 Units  As needed (PRN)         10/12/23 1406                Discharge Planning   LAITH:      Code Status: DNR   Is the patient medically ready for discharge?:     Reason for patient still in hospital (select all that apply): Treatment  Discharge Plan A: Home Health            Critical care time spent on the evaluation and treatment of severe organ dysfunction, review of pertinent labs and imaging studies, discussions with consulting providers and discussions with patient/family: >30 minutes.      Domi Pretty MD  Department of Hospital Medicine   [facility] Intensive Care

## 2023-10-17 NOTE — PROGRESS NOTES
Nephrology Progress Note       Consult Requested By: Domi Pretty MD  Reason for Consult: ESRD     SUBJECTIVE:            ?    Review of Systems   Constitutional:  Negative for chills, fever and malaise/fatigue.   HENT:  Negative for congestion and sore throat.    Eyes:  Negative for blurred vision, double vision and photophobia.   Respiratory:  Positive for shortness of breath (chronic). Negative for cough.    Cardiovascular:  Negative for chest pain, palpitations and leg swelling.   Gastrointestinal:  Negative for abdominal pain, diarrhea, nausea and vomiting.   Genitourinary:  Negative for dysuria and urgency.   Musculoskeletal:  Negative for joint pain and myalgias.   Skin:  Negative for itching and rash.   Neurological:  Negative for dizziness, sensory change, weakness and headaches.   Endo/Heme/Allergies:  Negative for polydipsia. Does not bruise/bleed easily.   Psychiatric/Behavioral:  Negative for depression. The patient is nervous/anxious.        Past Medical History:   Diagnosis Date    Acute congestive heart failure 02/10/2020    Anemia     Bilateral renal cysts     Cataract     Chronic LBP 7/26/2012    Chronic pain     CKD (chronic kidney disease), stage IV     Colon polyp 2013    COPD (chronic obstructive pulmonary disease)     Dehydration     Encounter for blood transfusion     HTN (hypertension)     Lumbar spondylosis     Melanoma     of the lip    Metabolic bone disease     Migraines, neuralgic     Osteoporosis     Primary osteoarthritis of both knees     s/p Rt TKA    Pulmonary embolism with infarction     Seizures 1972    x1 only    Subdeltoid bursitis, L>R. 9/27/2012    Ulcer     Vitamin D deficiency disease           OBJECTIVE:     Vital Signs (Most Recent)  Vitals:    10/17/23 0800 10/17/23 0806 10/17/23 0830 10/17/23 0900   BP: (!) 127/57 (!) 127/57 (!) 114/53 106/61   BP Location:       Patient Position:       Pulse: 107  (!) 121 109   Resp: 16  16 17   Temp:       TempSrc:        SpO2: 97%  95% 98%   Weight:       Height:             Date 10/17/23 0700 - 10/18/23 0659   Shift 5546-2744 3657-9375 3494-9704 24 Hour Total   INTAKE   P.O. 120   120   Shift Total(mL/kg) 120(2.9)   120(2.9)   OUTPUT   Urine(mL/kg/hr) 0   0   Shift Total(mL/kg) 0(0)   0(0)   Weight (kg) 40.8 40.8 40.8 40.8           Medications:          Physical Exam  Vitals and nursing note reviewed.   Constitutional:       General: She is not in acute distress.     Appearance: She is ill-appearing. She is not diaphoretic.      Comments: Frail    HENT:      Head: Normocephalic and atraumatic.      Mouth/Throat:      Pharynx: No oropharyngeal exudate.   Eyes:      General: No scleral icterus.     Conjunctiva/sclera: Conjunctivae normal.      Pupils: Pupils are equal, round, and reactive to light.   Cardiovascular:      Rate and Rhythm: Normal rate and regular rhythm.      Heart sounds: Normal heart sounds. No murmur heard.  Pulmonary:      Effort: No respiratory distress.      Breath sounds: Rales present. No wheezing.   Abdominal:      General: Bowel sounds are normal. There is no distension.      Palpations: Abdomen is soft.      Tenderness: There is no abdominal tenderness.   Musculoskeletal:         General: Normal range of motion.      Cervical back: Normal range of motion and neck supple.      Right lower leg: No edema.      Left lower leg: No edema.      Comments:    CVCRIJ    Skin:     General: Skin is warm and dry.      Findings: No erythema.   Neurological:      Mental Status: She is alert and oriented to person, place, and time.      Cranial Nerves: No cranial nerve deficit.   Psychiatric:         Mood and Affect: Affect normal. Mood is anxious.         Behavior: Behavior is agitated.         Cognition and Memory: Memory normal.         Judgment: Judgment normal.         Laboratory:  Recent Labs   Lab 10/15/23  0433 10/16/23  0359 10/17/23  0447   WBC 20.13* 16.80* 16.79*   HGB 9.9* 9.5* 10.9*   HCT 30.5* 29.8* 33.4*     174 153   MONO 6.9  1.4* 7.0  1.2* 8.0  1.4*   EOSINOPHIL 0.0 0.1 0.2       Recent Labs   Lab 10/15/23  0433 10/16/23  0359 10/17/23  0447   * 124* 131*   K 5.2* 6.6* 4.6   CL 92* 88* 90*   CO2 25 19* 23   BUN 58* 85* 33*   CREATININE 5.4* 6.9* 3.6*   CALCIUM 8.5* 8.5* 8.9   PHOS 3.5 4.8* 4.6*         Diagnostic Results:  X-Ray: Reviewed  US: Reviewed  Echo: Reviewed  ASSESSMENT/PLAN:     1. ESRD  - HD MWF [facility] With Dr Aura Diggs   --   at her Unit  with minimal pull  due to Hypotension and tachycardia anxiety.   -- Now here again SOB Hyperkalemia volume overload   -- HD with UF  Friday  3L and UF Saturday and Monday 3L      Can take lasix on TTSS as its contributing to her hypotension   Has WBC and low BP VIJAY always low received steroids on admission   hold her benzo's before HD to avoid Hypotension   -- still crackles on exam UF 1.5 -2L  today as well          -- Keep MWF after   -- Daily Renal Function Panel  -- Avoid Hypotension.  -- Renally dose all meds    2. HTN (I10) -  Controlled   3. Anemia of chronic kidney disease treated with BILL     EPogen  with   HD as needed    Recent Labs   Lab 10/15/23  0433 10/16/23  0359 10/17/23  0447   HGB 9.9* 9.5* 10.9*   HCT 30.5* 29.8* 33.4*    174 153         Iron   Lab Results   Component Value Date    IRON 13 (L) 02/12/2020    TIBC 462 (H) 02/12/2020    FERRITIN 148 07/17/2019       4. MBD (E88.9 M90.80) -  Recent Labs   Lab 10/17/23  0447   CALCIUM 8.9   PHOS 4.6*       Recent Labs   Lab 10/12/23  1232 10/12/23  1552   MG 2.2 2.1         Lab Results   Component Value Date    .0 (H) 12/28/2018    CALCIUM 8.9 10/17/2023    PHOS 4.6 (H) 10/17/2023     Lab Results   Component Value Date    UGCYEQWT48YM 26 (L) 02/12/2020     Sevelamer   Lab Results   Component Value Date    CO2 23 10/17/2023       5. Nutrition/Hypoalbuminemia   Recent Labs   Lab 10/16/23  0359 10/17/23  0447   ALBUMIN 3.4* 3.6       Nepro with meals TID. Renal vitamins  daily   .    Thank you for the consult, will follow  With any question please call [phone]  Ramo Da Silva MD    [facility]     NEIL Diggs MD,   MD CURTIS Benoit MD E. V. Harmon, NP    [address]  [place], [address]  [phone]

## 2023-10-18 LAB
ANION GAP SERPL CALC-SCNC: 23 MMOL/L (ref 8–16)
BACTERIA BLD CULT: NORMAL
BACTERIA BLD CULT: NORMAL
BASOPHILS # BLD AUTO: 0.05 K/UL (ref 0–0.2)
BASOPHILS NFR BLD: 0.3 % (ref 0–1.9)
BUN SERPL-MCNC: 64 MG/DL (ref 8–23)
CALCIUM SERPL-MCNC: 9.2 MG/DL (ref 8.7–10.5)
CHLORIDE SERPL-SCNC: 89 MMOL/L (ref 95–110)
CO2 SERPL-SCNC: 20 MMOL/L (ref 23–29)
CREAT SERPL-MCNC: 6.2 MG/DL (ref 0.5–1.4)
DIFFERENTIAL METHOD: ABNORMAL
EOSINOPHIL # BLD AUTO: 1.2 K/UL (ref 0–0.5)
EOSINOPHIL NFR BLD: 6.8 % (ref 0–8)
ERYTHROCYTE [DISTWIDTH] IN BLOOD BY AUTOMATED COUNT: 16.6 % (ref 11.5–14.5)
EST. GFR  (NO RACE VARIABLE): 6 ML/MIN/1.73 M^2
GLUCOSE SERPL-MCNC: 107 MG/DL (ref 70–110)
HCT VFR BLD AUTO: 37.6 % (ref 37–48.5)
HGB BLD-MCNC: 12.3 G/DL (ref 12–16)
IMM GRANULOCYTES # BLD AUTO: 0.45 K/UL (ref 0–0.04)
IMM GRANULOCYTES NFR BLD AUTO: 2.6 % (ref 0–0.5)
LYMPHOCYTES # BLD AUTO: 1.2 K/UL (ref 1–4.8)
LYMPHOCYTES NFR BLD: 7.1 % (ref 18–48)
MCH RBC QN AUTO: 33.1 PG (ref 27–31)
MCHC RBC AUTO-ENTMCNC: 32.7 G/DL (ref 32–36)
MCV RBC AUTO: 101 FL (ref 82–98)
MONOCYTES # BLD AUTO: 1.4 K/UL (ref 0.3–1)
MONOCYTES NFR BLD: 7.8 % (ref 4–15)
NEUTROPHILS # BLD AUTO: 13.2 K/UL (ref 1.8–7.7)
NEUTROPHILS NFR BLD: 75.4 % (ref 38–73)
NRBC BLD-RTO: 1 /100 WBC
PLATELET # BLD AUTO: 134 K/UL (ref 150–450)
PMV BLD AUTO: 10.8 FL (ref 9.2–12.9)
POCT GLUCOSE: 115 MG/DL (ref 70–110)
POTASSIUM SERPL-SCNC: 4.1 MMOL/L (ref 3.5–5.1)
RBC # BLD AUTO: 3.72 M/UL (ref 4–5.4)
SODIUM SERPL-SCNC: 132 MMOL/L (ref 136–145)
WBC # BLD AUTO: 17.52 K/UL (ref 3.9–12.7)

## 2023-10-18 PROCEDURE — 27100171 HC OXYGEN HIGH FLOW UP TO 24 HOURS: Mod: HCNC

## 2023-10-18 PROCEDURE — 94640 AIRWAY INHALATION TREATMENT: CPT | Mod: HCNC

## 2023-10-18 PROCEDURE — 97530 THERAPEUTIC ACTIVITIES: CPT | Mod: HCNC,CO

## 2023-10-18 PROCEDURE — 63600175 PHARM REV CODE 636 W HCPCS: Mod: HCNC | Performed by: FAMILY MEDICINE

## 2023-10-18 PROCEDURE — 99900035 HC TECH TIME PER 15 MIN (STAT): Mod: HCNC

## 2023-10-18 PROCEDURE — 80048 BASIC METABOLIC PNL TOTAL CA: CPT | Mod: HCNC | Performed by: FAMILY MEDICINE

## 2023-10-18 PROCEDURE — 85025 COMPLETE CBC W/AUTO DIFF WBC: CPT | Mod: HCNC | Performed by: FAMILY MEDICINE

## 2023-10-18 PROCEDURE — 97535 SELF CARE MNGMENT TRAINING: CPT | Mod: HCNC,CO

## 2023-10-18 PROCEDURE — 27000221 HC OXYGEN, UP TO 24 HOURS: Mod: HCNC

## 2023-10-18 PROCEDURE — 25000242 PHARM REV CODE 250 ALT 637 W/ HCPCS: Mod: HCNC | Performed by: FAMILY MEDICINE

## 2023-10-18 PROCEDURE — 94761 N-INVAS EAR/PLS OXIMETRY MLT: CPT | Mod: HCNC

## 2023-10-18 PROCEDURE — 80100016 HC MAINTENANCE HEMODIALYSIS: Mod: HCNC

## 2023-10-18 PROCEDURE — 25000003 PHARM REV CODE 250: Mod: HCNC | Performed by: FAMILY MEDICINE

## 2023-10-18 PROCEDURE — 36415 COLL VENOUS BLD VENIPUNCTURE: CPT | Mod: HCNC | Performed by: FAMILY MEDICINE

## 2023-10-18 PROCEDURE — 25000003 PHARM REV CODE 250: Mod: HCNC | Performed by: STUDENT IN AN ORGANIZED HEALTH CARE EDUCATION/TRAINING PROGRAM

## 2023-10-18 PROCEDURE — 94660 CPAP INITIATION&MGMT: CPT | Mod: HCNC

## 2023-10-18 PROCEDURE — 11000001 HC ACUTE MED/SURG PRIVATE ROOM: Mod: HCNC

## 2023-10-18 RX ADMIN — HEPARIN SODIUM 5000 UNITS: 5000 INJECTION INTRAVENOUS; SUBCUTANEOUS at 09:10

## 2023-10-18 RX ADMIN — MIRTAZAPINE 7.5 MG: 7.5 TABLET ORAL at 09:10

## 2023-10-18 RX ADMIN — HEPARIN SODIUM 5000 UNITS: 5000 INJECTION INTRAVENOUS; SUBCUTANEOUS at 08:10

## 2023-10-18 RX ADMIN — MIDODRINE HYDROCHLORIDE 15 MG: 5 TABLET ORAL at 08:10

## 2023-10-18 RX ADMIN — SEVELAMER CARBONATE 800 MG: 800 TABLET, FILM COATED ORAL at 06:10

## 2023-10-18 RX ADMIN — IPRATROPIUM BROMIDE AND ALBUTEROL SULFATE 3 ML: 2.5; .5 SOLUTION RESPIRATORY (INHALATION) at 07:10

## 2023-10-18 RX ADMIN — SEVELAMER CARBONATE 800 MG: 800 TABLET, FILM COATED ORAL at 08:10

## 2023-10-18 RX ADMIN — IPRATROPIUM BROMIDE AND ALBUTEROL SULFATE 3 ML: 2.5; .5 SOLUTION RESPIRATORY (INHALATION) at 12:10

## 2023-10-18 RX ADMIN — HYDROCODONE BITARTRATE AND ACETAMINOPHEN 1 TABLET: 10; 325 TABLET ORAL at 05:10

## 2023-10-18 RX ADMIN — MEGESTROL ACETATE 20 MG: 20 TABLET ORAL at 08:10

## 2023-10-18 RX ADMIN — NEPHROCAP 1 CAPSULE: 1 CAP ORAL at 09:10

## 2023-10-18 RX ADMIN — FLUTICASONE FUROATE AND VILANTEROL TRIFENATATE 1 PUFF: 200; 25 POWDER RESPIRATORY (INHALATION) at 09:10

## 2023-10-18 RX ADMIN — BUSPIRONE HYDROCHLORIDE 10 MG: 5 TABLET ORAL at 08:10

## 2023-10-18 RX ADMIN — ACETAMINOPHEN 325MG 650 MG: 325 TABLET ORAL at 03:10

## 2023-10-18 RX ADMIN — ACETAMINOPHEN 325MG 650 MG: 325 TABLET ORAL at 08:10

## 2023-10-18 RX ADMIN — MUPIROCIN: 20 OINTMENT TOPICAL at 08:10

## 2023-10-18 RX ADMIN — MUPIROCIN: 20 OINTMENT TOPICAL at 09:10

## 2023-10-18 RX ADMIN — IPRATROPIUM BROMIDE AND ALBUTEROL SULFATE 3 ML: 2.5; .5 SOLUTION RESPIRATORY (INHALATION) at 03:10

## 2023-10-18 RX ADMIN — MEGESTROL ACETATE 20 MG: 20 TABLET ORAL at 09:10

## 2023-10-18 RX ADMIN — IPRATROPIUM BROMIDE AND ALBUTEROL SULFATE 3 ML: 2.5; .5 SOLUTION RESPIRATORY (INHALATION) at 04:10

## 2023-10-18 RX ADMIN — HYDROCODONE BITARTRATE AND ACETAMINOPHEN 1 TABLET: 10; 325 TABLET ORAL at 09:10

## 2023-10-18 RX ADMIN — LORAZEPAM 0.5 MG: 0.5 TABLET ORAL at 09:10

## 2023-10-18 NOTE — PROGRESS NOTES
10/18/23 1211 10/18/23 1217   Vital Signs   BP (!) 156/77 136/73   Post-Hemodialysis Assessment   Rinseback Volume (mL) 250 mL  --    Blood Volume Processed (Liters) 27 L  --    Dialyzer Clearance Clear  --    Duration of Treatment 29 minutes  --    Additional Fluid Intake (mL) 500 mL  --    Total UF (mL) 369 mL  --    Net Fluid Removal -200  --    Patient Response to Treatment Pt didn't tolerated tx  --    Post-Hemodialysis Comments Pt was rinsed back and became responisive. AAOX3.  Nephrologist aware of situation. Pt remains stable and calm, Pt responseds to voice.

## 2023-10-18 NOTE — NURSING TRANSFER
Nursing Transfer Note      10/17/2023   6:45 PM    Nurse giving handoff:Sera SANCHEZ   Nurse receiving handoff:Rosa SANCEHZ    Reason patient is being transferred: Step down to Tele Med    Transfer To: Tele     Transfer via bed    Transfer with cardiac monitoring and O2    Transported by This RN and transport tech X 1    Transfer Vital Signs:  Blood Pressure:119/56  Heart Rate:104  O2:94%  Temperature:97.9  Respirations:18    Telemetry: Box Number [id number]  Order for Tele Monitor? Yes    Additional Lines: Oxygen    Medicines sent: Yes    Any special needs or follow-up needed: NO    Patient belongings transferred with patient: Yes    Chart send with patient: Yes    Notified: daughter    Upon arrival to floor: cardiac monitor applied, patient oriented to room, personal belongings and call bell in reach, and bed in lowest position. Daughter at the bedside. Receiving RN at beside.

## 2023-10-18 NOTE — PHYSICIAN QUERY
PT Name: Katie Quevedo  MR #: [id number]     DOCUMENTATION CLARIFICATION     CDS/: Luisa Lee RN CCDS             Contact information:[email or web address]  This form is a permanent document in the medical record.     Query Date: October 18, 2023    By submitting this query, we are merely seeking further clarification of documentation.  Please utilize your independent clinical judgment when addressing the question(s) below.    The Medical Record contains the following   Indicators Supporting Clinical Findings Location in Medical Record    Heart Failure documented #CHF(HFpEF): fluid restriction, strict I's/o's. Low sodium diet. TTE showed concentric remodeling, normal EF 65%.  - . EKG negative for acute ischemic changes.  - Furosemide BID, avoid in setting of hypotension.   Critical Care PN 10/13   x BNP BNP= 846   Lab 10/12   x EF/Echo  Left Ventricle: The left ventricle is normal in size. There is concentric remodeling. Normal wall motion. There is normal systolic function. Ejection fraction by visual approximation is 65%. There is normal diastolic function.    Right Ventricle: Systolic function is normal.    Mitral Valve: There is moderate stenosis. The mean pressure gradient across the mitral valve is 5 mmHg at a heart rate of  bpm.    Pulmonary Artery: The estimated pulmonary artery systolic pressure is 40 mmHg.    IVC/SVC: Elevated venous pressure at 15 mmHg.   Echo 10/13   x Radiology findings Chest xray : pleural effusions and bilateral basilar subsegmental atelectasis. Developing left lower lung zone consolidation not excluded   Critical Care PN 10/13   x Subjective/Objective Respiratory Conditions Dyspnea: Due to volume overload. Less likely due to COPD exacerbation. Patient has had numerous unsuccessful dialysis sessions contributing to volume overload/pulmonary  edema/dyspnea. BNP elevated to 846 (highest BNP recorded for this patient).   Critical Care PN 10/13    Recent/Current MI       Heart Transplant, LVAD     x Edema, JVD  Patient has had numerous unsuccessful dialysis sessions contributing to volume overload/pulmonary  edema/dyspnea.   Critical Care PN 10/13    Ascites     x Diuretics/Meds Lasix 80 mg PO on Tues, Thurs, and Sat  Can take lasix on TTSS as its contributing to her hypotension   MAR  Critical Care PN 10/18    Other Treatment     x Other Spoke with Dr. Da Silva and we both agree that patient's volume overload is the major contributor to her dyspnea. Counseled patient extensively on continuing a no-salt diet at home.   Critical Care PN 10/13     Heart failure is a clinical diagnosis which includes symptomatic fluid retention, elevated intracardiac pressures, and/or the inability of the heart to deliver adequate blood flow.    Heart Failure with reduced Ejection Fraction (HFrEF) or Systolic Heart Failure (loses ability to contract normally, EF is <40%)    Heart Failure with preserved Ejection Fraction (HFpEF) or Diastolic Heart Failure (stiff ventricles, does not relax properly, EF is >50%)     Heart Failure with Combined Systolic and Diastolic Failure (stiff ventricles, does not relax properly and EF is <50%)    Mid-range or mildly reduced ejection fraction (HFmrEF) is classified as systolic heart failure.  Congestive heart failure with a recovered EF is classified as Diastolic Heart Failure.  Common clues to acute exacerbation:  Rapidly progressive symptoms (w/in 2 weeks of presentation), using IV diuretics, using supplemental O2, pulmonary edema on Xray, new or worsening pleural effusion, +JVD or other signs of volume overload, MI w/in 4 weeks, and/or BNP >500  The clinical guidelines noted are only system guidelines, and do not replace the providers clinical judgment.    Please further specify the Acuity of the Diastolic CHF.  Thank you.    [  x ]  Acute on Chronic Diastolic Heart Failure (HFpEF) - worsening of CHF signs/symptoms in preexisting CHF   [   ]  Chronic Diastolic Heart  Failure (HFpEF) - preexisting and stable   [   ]  Other (please specify): ___________________________________   [  ]  Clinically Undetermined           Please document in your progress notes daily for the duration of treatment until resolved and include in your discharge summary.    References:  American Heart Association editorial staff. (2017, May). Ejection Fraction Heart Failure Measurement. American Heart Association. https://www.heart.org/en/health-topics/heart-failure/diagnosing-heart-failure/ejection-fraction-heart-failure-measurement#:~:text=Ejection%20fraction%20(EF)%20is%20a,pushed%20out%20with%20each%20heartbeat  ALEJANDRO Millan (2020, December 15). Heart failure with preserved ejection fraction: Clinical manifestations and diagnosis. [employer]Date. https://www.[employer]/contents/heart-failure-with-preserved-ejection-fraction-clinical-manifestations-and-diagnosis.  ICD-10-CM/PCS Coding Clinic Third Quarter ICD-10, Effective with discharges: September 8, 2020 [facility] § Heart failure with mid-range or mildly reduced ejection fraction (2020).  ICD-10-CM/PCS Coding Clinic Third Quarter ICD-10, Effective with discharges: September 8, 2020 [facility] § Heart failure with recovered ejection fraction (2020).  Form No. [id number]

## 2023-10-18 NOTE — PROGRESS NOTES
Nephrology Progress Note       Consult Requested By: Domi Pretty MD  Reason for Consult: ESRD     SUBJECTIVE:            ?    Review of Systems   Constitutional:  Negative for chills, fever and malaise/fatigue.   HENT:  Negative for congestion and sore throat.    Eyes:  Negative for blurred vision, double vision and photophobia.   Respiratory:  Positive for shortness of breath (chronic). Negative for cough.    Cardiovascular:  Negative for chest pain, palpitations and leg swelling.   Gastrointestinal:  Negative for abdominal pain, diarrhea, nausea and vomiting.   Genitourinary:  Negative for dysuria and urgency.   Musculoskeletal:  Negative for joint pain and myalgias.   Skin:  Negative for itching and rash.   Neurological:  Negative for dizziness, sensory change, weakness and headaches.   Endo/Heme/Allergies:  Negative for polydipsia. Does not bruise/bleed easily.   Psychiatric/Behavioral:  Negative for depression. The patient is nervous/anxious.        Past Medical History:   Diagnosis Date    Acute congestive heart failure 02/10/2020    Anemia     Bilateral renal cysts     Cataract     Chronic LBP 7/26/2012    Chronic pain     CKD (chronic kidney disease), stage IV     Colon polyp 2013    COPD (chronic obstructive pulmonary disease)     Dehydration     Encounter for blood transfusion     HTN (hypertension)     Lumbar spondylosis     Melanoma     of the lip    Metabolic bone disease     Migraines, neuralgic     Osteoporosis     Primary osteoarthritis of both knees     s/p Rt TKA    Pulmonary embolism with infarction     Seizures 1972    x1 only    Subdeltoid bursitis, L>R. 9/27/2012    Ulcer     Vitamin D deficiency disease           OBJECTIVE:     Vital Signs (Most Recent)  Vitals:    10/18/23 0730 10/18/23 0757 10/18/23 0918 10/18/23 1120   BP:  118/65     BP Location:  Right leg     Patient Position:  Lying     Pulse: (!) 116 (!) 124 (!) 112 110   Resp: 20 17 20 18   Temp:  97.5 °F (36.4 °C)  97.6 °F  (36.4 °C)   TempSrc:  Axillary  Oral   SpO2: 97% 96% (!) 94% 97%   Weight:       Height:                       Medications:          Physical Exam  Vitals and nursing note reviewed.   Constitutional:       General: She is not in acute distress.     Appearance: She is ill-appearing. She is not diaphoretic.      Comments: Frail    HENT:      Head: Normocephalic and atraumatic.      Mouth/Throat:      Pharynx: No oropharyngeal exudate.   Eyes:      General: No scleral icterus.     Conjunctiva/sclera: Conjunctivae normal.      Pupils: Pupils are equal, round, and reactive to light.   Cardiovascular:      Rate and Rhythm: Normal rate and regular rhythm.      Heart sounds: Normal heart sounds. No murmur heard.  Pulmonary:      Effort: No respiratory distress.      Breath sounds: Rales present. No wheezing.   Abdominal:      General: Bowel sounds are normal. There is no distension.      Palpations: Abdomen is soft.      Tenderness: There is no abdominal tenderness.   Musculoskeletal:         General: Normal range of motion.      Cervical back: Normal range of motion and neck supple.      Right lower leg: No edema.      Left lower leg: No edema.      Comments:    CVCRIJ    Skin:     General: Skin is warm and dry.      Findings: No erythema.   Neurological:      Mental Status: She is alert and oriented to person, place, and time.      Cranial Nerves: No cranial nerve deficit.   Psychiatric:         Mood and Affect: Affect normal. Mood is anxious.         Behavior: Behavior is agitated.         Cognition and Memory: Memory normal.         Judgment: Judgment normal.         Laboratory:  Recent Labs   Lab 10/16/23  0359 10/17/23  0447 10/18/23  0959   WBC 16.80* 16.79* 17.52*   HGB 9.5* 10.9* 12.3   HCT 29.8* 33.4* 37.6    153 134*   MONO 7.0  1.2* 8.0  1.4* 7.8  1.4*   EOSINOPHIL 0.1 0.2 6.8       Recent Labs   Lab 10/15/23  0433 10/16/23  0359 10/17/23  0447 10/18/23  0959   * 124* 131* 132*   K 5.2* 6.6* 4.6  4.1   CL 92* 88* 90* 89*   CO2 25 19* 23 20*   BUN 58* 85* 33* 64*   CREATININE 5.4* 6.9* 3.6* 6.2*   CALCIUM 8.5* 8.5* 8.9 9.2   PHOS 3.5 4.8* 4.6*  --          Diagnostic Results:  X-Ray: Reviewed  US: Reviewed  Echo: Reviewed  ASSESSMENT/PLAN:     1. ESRD  - HD MWF [facility] With Dr Aura Diggs   --   at her Unit  with minimal pull  due to Hypotension and tachycardia anxiety.   -- Now here again SOB Hyperkalemia volume overload   -- HD with UF  Friday  3L and UF Saturday and Monday 3L   UF yesterday 2L    Can take lasix on TTSS as its contributing to her hypotension   hold her benzo's before HD to avoid Hypotension - discussed with Patient  -- HD today with  UF 2-3L  today as tolerated           -- Keep MWF after   -- Daily Renal Function Panel  -- Avoid Hypotension.  -- Renally dose all meds    2. HTN (I10) -  Controlled   3. Anemia of chronic kidney disease treated with BILL     EPogen  with   HD as needed    Recent Labs   Lab 10/16/23  0359 10/17/23  0447 10/18/23  0959   HGB 9.5* 10.9* 12.3   HCT 29.8* 33.4* 37.6    153 134*         Iron   Lab Results   Component Value Date    IRON 13 (L) 02/12/2020    TIBC 462 (H) 02/12/2020    FERRITIN 148 07/17/2019       4. MBD (E88.9 M90.80) -  Recent Labs   Lab 10/17/23  0447 10/18/23  0959   CALCIUM 8.9 9.2   PHOS 4.6*  --        Recent Labs   Lab 10/12/23  1232 10/12/23  1552   MG 2.2 2.1         Lab Results   Component Value Date    .0 (H) 12/28/2018    CALCIUM 9.2 10/18/2023    PHOS 4.6 (H) 10/17/2023     Lab Results   Component Value Date    FVSQPFAE07PU 26 (L) 02/12/2020     Sevelamer   Lab Results   Component Value Date    CO2 20 (L) 10/18/2023       5. Nutrition/Hypoalbuminemia   Recent Labs   Lab 10/16/23  0359 10/17/23  0447   ALBUMIN 3.4* 3.6       Nepro with meals TID. Renal vitamins daily   .    Thank you for the consult, will follow  With any question please call [phone]  Ramo Da Silva MD    [facility]     NEIL Diggs MD,   O.  MD CURTIS Mitchell MD E. V. Harmon, NP    [address]  [place], [address]  [phone]

## 2023-10-18 NOTE — PLAN OF CARE
Code blue called for patient not responding for about 15 mins into dialysis. Patient was rinsed back and bacame responsive. The initial vitals: BP 99/76 mmHg, Pulse 82, oxygen sat 95% on 2L via NC.    On presentation, patient was awake, answering questions appropriately and following commands. Patient discloses no chest pain or palpitation.  Repeat Vital signs, /77 mmHg, Heart rate of 104 and oxygen sat of 95% on 2 L.  POC glucose check was 115.    Likely dialysis related hypotension.  Patient blood pressure is stable after patient was rinsed back.   Continue to monitor on floor on telemetry.    Anthony Verdugo MD  [employer] PCCM Fellow, ELAINE EVANS

## 2023-10-18 NOTE — CARE UPDATE
-Family meeting planned for tomorrow (10/19/2023) at 11:30 with pts  and children to discuss pts inability to safely tolerate HD following today's code blue during HD.  Nephrology and attending MD will be present as well.    -Pt code status remains DNR. LaPOST on file.

## 2023-10-18 NOTE — SUBJECTIVE & OBJECTIVE
Interval History: had LOC for 15min due to hypotension    Review of Systems   All other systems reviewed and are negative.    Objective:     Vital Signs (Most Recent):  Temp: 97.4 °F (36.3 °C) (10/18/23 1308)  Pulse: (!) 113 (10/18/23 1308)  Resp: 16 (10/18/23 1308)  BP: (!) 134/55 (10/18/23 1308)  SpO2: 95 % (10/18/23 1308) Vital Signs (24h Range):  Temp:  [97.3 °F (36.3 °C)-98 °F (36.7 °C)] 97.4 °F (36.3 °C)  Pulse:  [] 113  Resp:  [16-25] 16  SpO2:  [92 %-98 %] 95 %  BP: ()/(54-83) 134/55     Weight: 41.5 kg (91 lb 7.9 oz)  Body mass index is 16.73 kg/m².    Intake/Output Summary (Last 24 hours) at 10/18/2023 1510  Last data filed at 10/18/2023 1211  Gross per 24 hour   Intake 1028.09 ml   Output 2369 ml   Net -1340.91 ml           Physical Exam  Vitals and nursing note reviewed.   Constitutional:       Appearance: She is well-developed.   HENT:      Head: Normocephalic and atraumatic.      Nose: Nose normal.   Eyes:      Conjunctiva/sclera: Conjunctivae normal.   Cardiovascular:      Rate and Rhythm: Normal rate and regular rhythm.      Heart sounds: Normal heart sounds.   Pulmonary:      Effort: Pulmonary effort is normal.      Breath sounds: Normal breath sounds. No wheezing.   Abdominal:      General: Bowel sounds are normal.      Palpations: Abdomen is soft. There is no mass.      Tenderness: There is no abdominal tenderness. There is no guarding or rebound.   Musculoskeletal:         General: No tenderness. Normal range of motion.      Cervical back: Normal range of motion and neck supple.   Skin:     General: Skin is warm.      Findings: No rash.   Neurological:      Mental Status: She is alert and oriented to person, place, and time.      Cranial Nerves: No cranial nerve deficit.   Psychiatric:         Behavior: Behavior normal.         Thought Content: Thought content normal.             Significant Labs: All pertinent labs within the past 24 hours have been reviewed.  BMP:   Recent Labs    Lab 10/18/23  0959      *   K 4.1   CL 89*   CO2 20*   BUN 64*   CREATININE 6.2*   CALCIUM 9.2       CBC:   Recent Labs   Lab 10/17/23  0447 10/18/23  0959   WBC 16.79* 17.52*   HGB 10.9* 12.3   HCT 33.4* 37.6    134*         Significant Imaging: I have reviewed all pertinent imaging results/findings within the past 24 hours.  I have reviewed and interpreted all pertinent imaging results/findings within the past 24 hours.

## 2023-10-18 NOTE — NURSING
RAPID RESPONSE NURSE NOTE        Admit Date: 10/12/2023  LOS: 6  Code Status: DNR   Date of Consult: 10/18/2023  : 1945  Age: 78 y.o.  Weight:   Wt Readings from Last 1 Encounters:   10/17/23 41.5 kg (91 lb 7.9 oz)     Sex: female  Race: White   Bed: [place] [address] A:   MRN: [id number]  Time Rapid Response Team page Received: 1210  Time Rapid Response Team at Bedside: 1211  Time Rapid Response Team left Bedside: 1230  Was the patient discharged from an ICU this admission? No   Was the patient discharged from a PACU within last 24 hours? No   Did the patient receive conscious sedation/general anesthesia in last 24 hours? No   Was the patient in the ED within the past 24 hours? No   Was the patient on NIPPV within the past 24 hours? No   Attending Physician: Domi Pretty MD  Primary Service: Hospitalist     SITUATION     Notified by code pager.  Reason for alert: post ectal/hypotensive   Called to evaluate the patient for Circulatory    Why is the patient in the hospital?: Acute on chronic respiratory failure with hypoxia    Patient has a past medical history of Acute congestive heart failure, Anemia, Bilateral renal cysts, Cataract, Chronic LBP, Chronic pain, CKD (chronic kidney disease), stage IV, Colon polyp, COPD (chronic obstructive pulmonary disease), Dehydration, Encounter for blood transfusion, HTN (hypertension), Lumbar spondylosis, Melanoma, Metabolic bone disease, Migraines, neuralgic, Osteoporosis, Primary osteoarthritis of both knees, Pulmonary embolism with infarction, Seizures, Subdeltoid bursitis, L>R., Ulcer, and Vitamin D deficiency disease.    Last Vitals:  Temp: 97.6 °F (36.4 °C) (10/18 1130)  Pulse: 98 (10/18 1209)  Resp: 20 (10/18 1200)  BP: 136/73 (10/18 1217)  SpO2: 95 % (10/18 1200)    24 Hours Vitals Range:  Temp:  [97.3 °F (36.3 °C)-98 °F (36.7 °C)]   Pulse:  []   Resp:  [17-27]   BP: ()/(47-83)   SpO2:  [91 %-100 %]     Labs:  Recent Labs     10/16/23  0359 10/17/23  1980  "10/18/23  0959   WBC 16.80* 16.79* 17.52*   HGB 9.5* 10.9* 12.3   HCT 29.8* 33.4* 37.6    153 134*       Recent Labs     10/16/23  0359 10/17/23  0447 10/18/23  0959   * 131* 132*   K 6.6* 4.6 4.1   CL 88* 90* 89*   CO2 19* 23 20*   BUN 85* 33* 64*   CREATININE 6.9* 3.6* 6.2*    91 107   PHOS 4.8* 4.6*  --         No results for input(s): "PH", "PCO2", "PO2", "HCO3", "POCSATURATED", "BE" in the last 72 hours.     ASSESSMENT/INTERVENTIONS    The patient was seen for a Cardiac problem. Staff concerns included hypotension. The following interventions were performed: continuous cardiac monitoring and EKG. ]    Code blue called overhead but the patient did not lose a pulse. Patient became hypotensive after initiation of dialysis treatment. Bedside dialysis nurse throughout possible seizure but the patient is AAOx4 on arrival, following commands. Patient has had several episodes of hypotension requiring levophed and midodrine during and after treatments. Admitting team and palliative team to discuss further plan as the patient is no longer tolerating HD     RECOMMENDATIONS    We recommend:-Cont cardiac monitoring, hold dialysis    Discussed plan of care with Rounding completed with charge RN MET team    PROVIDER ESCALATION    Orders received and case discussed with Dr. Da Silva and pulm crit team .    Disposition: Remain in room 472    FOLLOW UP  Call the Rapid Response Nurse, Maggy Gonzalez at x [phone] for additional questions or concerns.           "

## 2023-10-18 NOTE — PROGRESS NOTES
[facility] Medicine  Progress Note    Patient Name: Katie Quevedo  MRN: [id number]  Patient Class: IP- Inpatient   Admission Date: 10/12/2023  Length of Stay: 6 days  Attending Physician: Domi Pretty MD  Primary Care Provider: Kingston Verduzco MD        Subjective:     Principal Problem:Acute on chronic respiratory failure with hypoxia        HPI:  Katie Quevedo is a 78 y.o.  female who  has a past medical history of Acute congestive heart failure (02/10/2020), Anemia, Bilateral renal cysts, Cataract, Chronic LBP (7/26/2012), Chronic pain, CKD (chronic kidney disease), stage IV, Colon polyp (2013), COPD (chronic obstructive pulmonary disease), Dehydration, Encounter for blood transfusion, HTN (hypertension), Lumbar spondylosis, Melanoma, Metabolic bone disease, Migraines, neuralgic, Osteoporosis, Primary osteoarthritis of both knees, Pulmonary embolism with infarction, Seizures (1972), Subdeltoid bursitis, L>R. (9/27/2012), Ulcer, and Vitamin D deficiency disease.. The patient presented to [facility] Medicine on 10/12/2023 with a primary complaint of Shortness of Breath (Pt presents to the ED for sob. Pt presents on a neb mask at 8lpm .  Pt has been hospitalized recently for pneumonia)     The patient was in their usual state of health until 2 days ago when she was discharge from [facility] for respiratory failure 2/2 pneumonia, COPD. She has had 2 other hospital admissions in the last 2 weeks. She was barely able to finish her HD session yesterday. Spoke with Dr. Montemayor, she has been struggling to complete HD sessions over the last 6 weeks and has becoming more and more frail and tachycardic on the machine and there is some concern she will not be able to continue with dialysis. She asked to come to the ER today for worsening shortness of breath. She does not have BiPAP at home. Daughter has not observed any issues with swallowing or episodes concerning for aspiration. She is  on chronic opiates, unclear if patient has been more sedated than usual at home.      Overview/Hospital Course:  Pt offf from levophed and now midodrine is increased. Dialysis per nephro. Palliative consulted and now pt is a DNR.   10/17: 1.5L removed. Complains of cramps after.   10/18: pt was unresponsive on dialysis for 15 min with hypotension. Pt now alert and oriented. Will plan for family meeting tomorrow to discuss goals of care      Interval History: had LOC for 15min due to hypotension    Review of Systems   All other systems reviewed and are negative.    Objective:     Vital Signs (Most Recent):  Temp: 97.4 °F (36.3 °C) (10/18/23 1308)  Pulse: (!) 113 (10/18/23 1308)  Resp: 16 (10/18/23 1308)  BP: (!) 134/55 (10/18/23 1308)  SpO2: 95 % (10/18/23 1308) Vital Signs (24h Range):  Temp:  [97.3 °F (36.3 °C)-98 °F (36.7 °C)] 97.4 °F (36.3 °C)  Pulse:  [] 113  Resp:  [16-25] 16  SpO2:  [92 %-98 %] 95 %  BP: ()/(54-83) 134/55     Weight: 41.5 kg (91 lb 7.9 oz)  Body mass index is 16.73 kg/m².    Intake/Output Summary (Last 24 hours) at 10/18/2023 1510  Last data filed at 10/18/2023 1211  Gross per 24 hour   Intake 1028.09 ml   Output 2369 ml   Net -1340.91 ml           Physical Exam  Vitals and nursing note reviewed.   Constitutional:       Appearance: She is well-developed.   HENT:      Head: Normocephalic and atraumatic.      Nose: Nose normal.   Eyes:      Conjunctiva/sclera: Conjunctivae normal.   Cardiovascular:      Rate and Rhythm: Normal rate and regular rhythm.      Heart sounds: Normal heart sounds.   Pulmonary:      Effort: Pulmonary effort is normal.      Breath sounds: Normal breath sounds. No wheezing.   Abdominal:      General: Bowel sounds are normal.      Palpations: Abdomen is soft. There is no mass.      Tenderness: There is no abdominal tenderness. There is no guarding or rebound.   Musculoskeletal:         General: No tenderness. Normal range of motion.      Cervical back: Normal  range of motion and neck supple.   Skin:     General: Skin is warm.      Findings: No rash.   Neurological:      Mental Status: She is alert and oriented to person, place, and time.      Cranial Nerves: No cranial nerve deficit.   Psychiatric:         Behavior: Behavior normal.         Thought Content: Thought content normal.             Significant Labs: All pertinent labs within the past 24 hours have been reviewed.  BMP:   Recent Labs   Lab 10/18/23  0959      *   K 4.1   CL 89*   CO2 20*   BUN 64*   CREATININE 6.2*   CALCIUM 9.2       CBC:   Recent Labs   Lab 10/17/23  0447 10/18/23  0959   WBC 16.79* 17.52*   HGB 10.9* 12.3   HCT 33.4* 37.6    134*         Significant Imaging: I have reviewed all pertinent imaging results/findings within the past 24 hours.  I have reviewed and interpreted all pertinent imaging results/findings within the past 24 hours.      Assessment/Plan:      * Acute on chronic respiratory failure with hypoxia  Patient with Hypoxic Respiratory failure which is Acute on chronic.  she is not on home oxygen. Supplemental oxygen was provided and noted- Oxygen Concentration (%):  [25] 25    .   Signs/symptoms of respiratory failure include- respiratory distress. Contributing diagnoses includes - penumonia.  Labs and images were reviewed. Patient Has recent ABG, which has been reviewed. Will treat underlying causes and adjust management of respiratory failure as follows    Multiple admissions for PNA and SOB   CXR Grossly stable appearing cardiopulmonary findings noting pleural effusions and bilateral basilar subsegmental atelectasis.  Developing left lower lung zone consolidation not excluded.    Started on BiPAP and weaned to 1 L NC  Procal 1.44, elevated from prior  Stop vancomycin  Pulm and Nephro feel this is more due to volume overload due to issues receiving dialysis and fluid removal, L lung findings are less likely recurrent pneumonia  Multiple thoracenteses in the  past   ceftriaxone and azithromycin was stopped  Monitor without iv abx  Resp cx, BCx  Request home BiPAP/Trelegy    Goals of care, counseling/discussion  Advance Care Planning     Code Status  In light of the patients advanced and life limiting illness,I engaged the the patient in a voluntary conversation about the patient's preferences for care  at the very end of life. The patient wishes to have a natural, peaceful death.  Along those lines, the patient does not wish to have CPR or other invasive treatments performed when her heart and/or breathing stops. I communicated to the patient that a DNR form was completed and will be scanned into EPIC.         Family discussion to be held tomorrow     Community acquired pneumonia of left lower lobe of lung  Cont iv abx      ESRD (end stage renal disease) on dialysis   Nephrology on board  she has been struggling to complete HD sessions over the last 6 weeks and has becoming more and more frail and tachycardic on the machine and there is some concern she will not be able to continue with dialysis  Also said it was okay to place a midline  HD per nephro       VTE Risk Mitigation (From admission, onward)         Ordered     heparin (porcine) injection 5,000 Units  Every 12 hours         10/12/23 1406     IP VTE HIGH RISK PATIENT  Once         10/12/23 1406     Place sequential compression device  Until discontinued         10/12/23 1406     Place sequential compression device  Until discontinued         10/12/23 1406     heparin (porcine) injection 4,100 Units  As needed (PRN)         10/12/23 1406                Discharge Planning   LAITH:      Code Status: DNR   Is the patient medically ready for discharge?:     Reason for patient still in hospital (select all that apply): Laboratory test and Treatment  Discharge Plan A: Home Health                  Domi Pretty MD  Department of Hospital Medicine   [facility]

## 2023-10-18 NOTE — PHYSICIAN QUERY
PT Name: Katie Quevedo  MR #: [id number]    DOCUMENTATION CLARIFICATION     CDS/: Luisa Lee  RN CCDS             Contact information: [email or web address]    This form is a permanent document in the medical record.     Query Date: October 18, 2023    By submitting this query, we are merely seeking further clarification of documentation.. Please utilize your independent clinical judgment when addressing the question(s) below.    The medical record contains the following:   Indicators  Supporting Clinical Findings Location in Medical Record   x Registered Dietician Diagnosis BMI Grade: 16 - 16.9 protein-energy malnutrition grade II   Registered Dietician PN 10/18   x Energy Intake Energy Calories Required: not meeting needs  Protein Required: not meeting needs  Fluid Required: not meeting needs  Comments: LBM 10/12  % Intake of Estimated Energy Needs: 50 - 75   % Meal Intake: 50 - 75 %   Registered Dietician PN 10/18   x Weight Loss % Weight Change From Usual Weight: -2.12 %  Weight Loss (Malnutrition):  (2% x 6 months)   Registered Dietician PN 10/18    Fat Loss     x Muscle Loss 10/18-mild wasting of legs, clavicles, thoracic region & arms   Registered Dietician PN 10/18    Edema/Fluid Accumulation      Reduced  Strength (by dynamometer)     x Weight, BMI, Usual Body Weight BMI= 16.4 Registered Dietician PN 10/18    Delayed Wound Healing     x Acute or Chronic Illness Respiratory Failure, COPD exacerbation, ESRD  Relevant Medical History: COPD, osteoporosis, HTN, bilateral renal cysts, PE, CKD, seziures, CHF, lumbar laminectomy, hysterectomy, thrombectomy   H&P    Registered Dietician PN 10/18    Social or Environmental Circumstances     x Treatment Recommendation:  1. Encourage intake of meals & Novasource Renal as tolerated. 2. Monitor weight/labs.  3. RD to follow to monitor po intake     Goals:  Pt will tolerate diet with at least 50% intake at meals by RD follow up  Nutrition Goal Status:  progressing towards goal  Communication of RD Recs: reviewed with RN   Registered Dietician PN 10/18   x Other [facility] provider note       Academy of Nutrition and Dietetics (Academy) and the American Society for Parenteral and Enteral Nutrition (A.S.P.E.N.) Clinical Characteristics to support Malnutrition      Criteria for mild malnutrition is defined as 1 characteristic outlined above within the established moderate or severe parameters.  A minimum of 2 out of the 6 characteristics noted above are recommended for a diagnosis of moderate or severe malnutrition.  Chronic illness/injury is a disease/condition lasting 3 months or longer.    The noted clinical guidelines are only system guidelines and do not replace the providers clinical judgment.    Provider, please specify diagnosis or diagnoses associated with above clinical findings.    [   x] Moderate Malnutrition - a minimum of 2 of the 6 moderate malnutrition characteristics noted above      [   ] Other Nutritional Diagnosis (please specify): _______         Please document in your progress notes daily for the duration of treatment until resolved and  include in your discharge summary.      References:    JAC Anderson, & JOHN Garza (2022, April). Assessment and management of anorexia and cachexia in palliative care. Retrieved May 23, 2022, from [email or web address]/assessment-and-management-[email or web address]-anorexia-and-cachexia-in-palliative-care?[email or web address]&source=see_link     JACQUELINE Terrazas, PhD, RD, Alexander CARRASCO P., PhD, RN, SIS Cain MD, PhD, Roberta BHAKTA A., MS, RD, [facility], NEIL Steele, MS, RD, The Academy Malnutrition Work Group, The A.S.P.E.N. Board of Directors. (2012). Consensus Statement: Academy of Nutrition and Dietetics and American Society for Parenteral and Enteral Nutrition: Characteristics Recommended for the Identification and Documentation of Adult Malnutrition (Undernutrition). Journal of Parenteral and Enteral Nutrition, 36(3), 275-283.  doi:10.1177/9844686973439069     Form No. 17960

## 2023-10-18 NOTE — ASSESSMENT & PLAN NOTE
Advance Care Planning     Code Status  In light of the patients advanced and life limiting illness,I engaged the the patient in a voluntary conversation about the patient's preferences for care  at the very end of life. The patient wishes to have a natural, peaceful death.  Along those lines, the patient does not wish to have CPR or other invasive treatments performed when her heart and/or breathing stops. I communicated to the patient that a DNR form was completed and will be scanned into EPIC.          Family discussion to be held tomorrow

## 2023-10-18 NOTE — NURSING
Patient returned from dialysis, she was unable to complete dialysis, blood pressure dropped. Upon returning to floor vitals were WNL, stable, she is AAO X 4 and eating her lunch. Will continue to monitor.

## 2023-10-18 NOTE — PROGRESS NOTES
"[facility] - Telemetry  Adult Nutrition  Progress Note    SUMMARY       Recommendations    Recommendation:  1. Encourage intake of meals & Novasource Renal as tolerated. 2. Monitor weight/labs.   3. RD to follow to monitor po intake    Goals:  Pt will tolerate diet with at least 50% intake at meals by RD follow up  Nutrition Goal Status: progressing towards goal  Communication of RD Recs: reviewed with RN    Assessment and Plan  Nutrition Problem  Altered Nutrition Related Lab Values     Related to (etiology):   Dx/hx     Signs and Symptoms (as evidenced by):   Na 127L, K 5.4H, BUN 55H, Crea 5.3H, Ca 8.0L, Alb 2.7L      Interventions:  Collaboration with other providers     Nutrition Diagnosis Status:   Continues      Malnutrition Assessment  Weight Loss (Malnutrition):  (2% x 6 months)       Reason for Assessment  Reason For Assessment: RD follow-up  Diagnosis:  (SOB)  Relevant Medical History: COPD, osteoporosis, HTN, bilateral renal cysts, PE, CKD, seziures, CHF, lumbar laminectomy, hysterectomy, thrombectomy  General Information Comments: Pt on Cardiac Renal with 1000ml fluid restriction and Novasource Renal. Pt with fair intake at meals. Reports good intake of breakfast this morning. Receives HD-not tolerating well. Ernesto 15-skin intact. Noted 2lb weight loss x 6 months. Reports UBW 120lb x 2 years ago. NFPE completed today 10/18-mild wasting of legs, clavicles, thoracic region & arms  Nutrition Discharge Planning: pt to d/c on Cardiac Renal diet    Nutrition Risk Screen  Nutrition Risk Screen: no indicators present    Nutrition/Diet History  Food Preferences: no Sabianist or cultural food prefs identified  Spiritual, Cultural Beliefs, Cheondoism Practices, Values that Affect Care: no  Factors Affecting Nutritional Intake: None identified at this time    Anthropometrics  Temp: 97.4 °F (36.3 °C)  Height Method: Stated  Height: 5' 2" (157.5 cm)  Height (inches): 62 in  Weight Method: Bed Scale  Weight: 41.5 kg (91 lb " 7.9 oz)  Weight (lb): 91.49 lb  Ideal Body Weight (IBW), Female: 110 lb  % Ideal Body Weight, Female (lb): 83.17 %  BMI (Calculated): 16.7  BMI Grade: 16 - 16.9 protein-energy malnutrition grade II  Usual Body Weight (UBW), k.4 kg ()  % Usual Body Weight: 98.08  % Weight Change From Usual Weight: -2.12 %     Lab/Procedures/Meds  Pertinent Labs Reviewed: reviewed  Pertinent Labs Comments: Na 131L, BUN 33H, Crea 3.6H, Phos 4.6H  Pertinent Medications Reviewed: reviewed  Pertinent Medications Comments: Lasix, heparin, Megace, renal vitamin    Estimated/Assessed Needs  Weight Used For Calorie Calculations: 41.5 kg (91 lb 7.9 oz)  Energy Calorie Requirements (kcal): 1452 (35 kcal/kg)  Energy Need Method: Kcal/kg  Protein Requirements: 54g (1.3g/kg)  Weight Used For Protein Calculations: 41.5 kg (91 lb 7.9 oz)  Estimated Fluid Requirement Method: RDA Method  RDA Method (mL): 1452     Nutrition Prescription Ordered  Current Diet Order: Cardiac Renal  Oral Nutrition Supplement: Novasource Renal    Evaluation of Received Nutrient/Fluid Intake  I/O: 1132/2000  Energy Calories Required: not meeting needs  Protein Required: not meeting needs  Fluid Required: not meeting needs  Comments: LBM 10/12  % Intake of Estimated Energy Needs: 50 - 75 %  % Meal Intake: 50 - 75 %    Nutrition Risk  Level of Risk/Frequency of Follow-up:  (2xweekly)     Monitor and Evaluation  Food and Nutrient Intake: food and beverage intake  Food and Nutrient Adminstration: diet order  Physical Activity and Function: nutrition-related ADLs and IADLs  Anthropometric Measurements: weight  Biochemical Data, Medical Tests and Procedures: electrolyte and renal panel  Nutrition-Focused Physical Findings: overall appearance     Nutrition Follow-Up  RD Follow-up?: Yes

## 2023-10-18 NOTE — PLAN OF CARE
Recommendation:  1. Encourage intake of meals & Novasource Renal as tolerated. 2. Monitor weight/labs.   3. RD to follow to monitor po intake    Goals:  Pt will tolerate diet with at least 50% intake at meals by RD follow up  Nutrition Goal Status: progressing towards goal

## 2023-10-18 NOTE — ASSESSMENT & PLAN NOTE
Patient with Hypoxic Respiratory failure which is Acute on chronic.  she is not on home oxygen. Supplemental oxygen was provided and noted- Oxygen Concentration (%):  [25] 25    .   Signs/symptoms of respiratory failure include- respiratory distress. Contributing diagnoses includes - penumonia.  Labs and images were reviewed. Patient Has recent ABG, which has been reviewed. Will treat underlying causes and adjust management of respiratory failure as follows    Multiple admissions for PNA and SOB   CXR Grossly stable appearing cardiopulmonary findings noting pleural effusions and bilateral basilar subsegmental atelectasis.  Developing left lower lung zone consolidation not excluded.    Started on BiPAP and weaned to 1 L NC  Procal 1.44, elevated from prior  Stop vancomycin  Pulm and Nephro feel this is more due to volume overload due to issues receiving dialysis and fluid removal, L lung findings are less likely recurrent pneumonia  Multiple thoracenteses in the past   ceftriaxone and azithromycin was stopped  Monitor without iv abx  Resp cx, BCx  Request home BiPAP/Trelegy

## 2023-10-18 NOTE — PLAN OF CARE
Problem: Occupational Therapy  Goal: Occupational Therapy Goal  Description: Goals to be met by: 11/13/2023     Patient will increase functional independence with ADLs by performing:    Grooming while seated with Set-up Assistance.  Toileting from bedside commode with Stand-by Assistance for hygiene and clothing management.   Rolling to Bilateral with Modified [place].   Supine to sit with Modified [place].  Step transfer with Stand-by Assistance & appropriate AD.  Toilet transfer to bedside commode with Stand-by Assistance & appropriate AD.    Outcome: Ongoing, Progressing   Katie Quevedo is a 78 y.o. female with a medical diagnosis of Acute on chronic respiratory failure with hypoxia.  Performance deficits affecting function are weakness, impaired endurance, impaired self care skills, impaired functional mobility, gait instability, impaired balance, decreased upper extremity function, decreased lower extremity function, impaired coordination, impaired fine motor.    Pt found in bed, agreeable to therapy.  Pt reports feeling better today with moderate SOB during exertion.  Pt with fair tolerance of therapy. Continue OT services to address functional goals, progressing as able.

## 2023-10-18 NOTE — PT/OT/SLP PROGRESS
Physical Therapy Visit Attempt      Patient Name:  Katie Quevedo   MRN:  [id number]    Patient not seen this AM secondary to Dialysis, Other (Comment) (Pt working with OT then [facility] for HD). Will follow-up as able.    10/18/2023

## 2023-10-18 NOTE — PT/OT/SLP PROGRESS
Occupational Therapy   Treatment    Name: Katie Quevedo  MRN: [id number]  Admitting Diagnosis:  Acute on chronic respiratory failure with hypoxia       Recommendations:     Discharge Recommendations: Low Intensity Therapy  Discharge Equipment Recommendations:  bedside commode  Barriers to discharge:  Other (Comment) (Increased level of assistance)    Assessment:     Katie Quevedo is a 78 y.o. female with a medical diagnosis of Acute on chronic respiratory failure with hypoxia.  Performance deficits affecting function are weakness, impaired endurance, impaired self care skills, impaired functional mobility, gait instability, impaired balance, decreased upper extremity function, decreased lower extremity function, impaired coordination, impaired fine motor.    Pt found in bed, agreeable to therapy.  Pt reports feeling better today with moderate SOB during exertion.  Pt with fair tolerance of therapy. Continue OT services to address functional goals, progressing as able.      Rehab Prognosis:  Fair; patient would benefit from acute skilled OT services to address these deficits and reach maximum level of function.       Plan:     Patient to be seen 5 x/week to address the above listed problems via self-care/home management, therapeutic activities, therapeutic exercises  Plan of Care Expires: 11/13/23  Plan of Care Reviewed with: patient    Subjective     Chief Complaint: I am not panting today.   Patient/Family Comments/goals: to go home  Pain/Comfort:  Pain Rating 1: 0/10  Pain Rating Post-Intervention 1: 0/10    Objective:     Communicated with: Rn prior to session.  Patient found HOB elevated with oxygen, PureWick, peripheral IV, telemetry upon OT entry to room.    General Precautions: Standard, fall, respiratory    Orthopedic Precautions:N/A  Braces: N/A  Respiratory Status: Nasal cannula     Occupational Performance:     Bed Mobility:    Patient completed Rolling/Turning to Right with contact guard assistance and with  side rail  Patient completed Scooting/Bridging with contact guard assistance  Patient completed Supine to Sit with contact guard assistance  Patient completed Sit to Supine with contact guard assistance     Functional Mobility/Transfers:  Patient completed Sit <> Stand Transfer with contact guard assistance  with  rolling walker and vcs for hand placement x 2 trials  Functional Mobility: pt took 3 steps for/back with CGA using RW.      Activities of Daily Living:  Grooming: stand by assistance sitting EOB      [facility] 6 Click ADL: 16    Treatment & Education:  Pt sat EOB with SBA for ~15 min.   Pt instructed on Pursed Lip Breathing Technique requiring min verbal cues to perform efficiently.       Patient left HOB elevated with all lines intact, call button in reach, bed alarm on, RN notified, and family present    GOALS:   Multidisciplinary Problems       Occupational Therapy Goals          Problem: Occupational Therapy    Goal Priority Disciplines Outcome Interventions   Occupational Therapy Goal     OT, PT/OT Ongoing, Progressing    Description: Goals to be met by: 11/13/2023     Patient will increase functional independence with ADLs by performing:    Grooming while seated with Set-up Assistance.  Toileting from bedside commode with Stand-by Assistance for hygiene and clothing management.   Rolling to Bilateral with Modified [place].   Supine to sit with Modified [place].  Step transfer with Stand-by Assistance & appropriate AD.  Toilet transfer to bedside commode with Stand-by Assistance & appropriate AD.                         Time Tracking:     OT Date of Treatment: 10/18/23  OT Start Time: 0959  OT Stop Time: 1022  OT Total Time (min): 23 min    Billable Minutes:Self Care/Home Management 10  Therapeutic Activity 13            10/18/2023

## 2023-10-18 NOTE — PLAN OF CARE
Problem: Adult Inpatient Plan of Care  Goal: Plan of Care Review  Outcome: Ongoing, Progressing  Goal: Optimal Comfort and Wellbeing  Outcome: Ongoing, Progressing     Problem: Fluid Imbalance (Pneumonia)  Goal: Fluid Balance  Outcome: Ongoing, Progressing

## 2023-10-19 LAB
ALBUMIN SERPL BCP-MCNC: 4.1 G/DL (ref 3.5–5.2)
ALP SERPL-CCNC: 65 U/L (ref 55–135)
ALT SERPL W/O P-5'-P-CCNC: 50 U/L (ref 10–44)
ANION GAP SERPL CALC-SCNC: 22 MMOL/L (ref 8–16)
AST SERPL-CCNC: 24 U/L (ref 10–40)
BASOPHILS # BLD AUTO: 0.06 K/UL (ref 0–0.2)
BASOPHILS NFR BLD: 0.4 % (ref 0–1.9)
BILIRUB SERPL-MCNC: 0.6 MG/DL (ref 0.1–1)
BUN SERPL-MCNC: 81 MG/DL (ref 8–23)
CALCIUM SERPL-MCNC: 9.2 MG/DL (ref 8.7–10.5)
CHLORIDE SERPL-SCNC: 89 MMOL/L (ref 95–110)
CO2 SERPL-SCNC: 20 MMOL/L (ref 23–29)
CREAT SERPL-MCNC: 8.1 MG/DL (ref 0.5–1.4)
DIFFERENTIAL METHOD: ABNORMAL
EOSINOPHIL # BLD AUTO: 1.1 K/UL (ref 0–0.5)
EOSINOPHIL NFR BLD: 6.9 % (ref 0–8)
ERYTHROCYTE [DISTWIDTH] IN BLOOD BY AUTOMATED COUNT: 17.2 % (ref 11.5–14.5)
EST. GFR  (NO RACE VARIABLE): 5 ML/MIN/1.73 M^2
GLUCOSE SERPL-MCNC: 104 MG/DL (ref 70–110)
HCT VFR BLD AUTO: 36.7 % (ref 37–48.5)
HGB BLD-MCNC: 12.1 G/DL (ref 12–16)
IMM GRANULOCYTES # BLD AUTO: 0.46 K/UL (ref 0–0.04)
IMM GRANULOCYTES NFR BLD AUTO: 3 % (ref 0–0.5)
LYMPHOCYTES # BLD AUTO: 1 K/UL (ref 1–4.8)
LYMPHOCYTES NFR BLD: 6.3 % (ref 18–48)
MAGNESIUM SERPL-MCNC: 2.5 MG/DL (ref 1.6–2.6)
MCH RBC QN AUTO: 33.1 PG (ref 27–31)
MCHC RBC AUTO-ENTMCNC: 33 G/DL (ref 32–36)
MCV RBC AUTO: 100 FL (ref 82–98)
MONOCYTES # BLD AUTO: 1 K/UL (ref 0.3–1)
MONOCYTES NFR BLD: 6.1 % (ref 4–15)
NEUTROPHILS # BLD AUTO: 12 K/UL (ref 1.8–7.7)
NEUTROPHILS NFR BLD: 77.3 % (ref 38–73)
NRBC BLD-RTO: 0 /100 WBC
PHOSPHATE SERPL-MCNC: 5.8 MG/DL (ref 2.7–4.5)
PLATELET # BLD AUTO: 145 K/UL (ref 150–450)
PMV BLD AUTO: 10.8 FL (ref 9.2–12.9)
POTASSIUM SERPL-SCNC: 4.8 MMOL/L (ref 3.5–5.1)
PROT SERPL-MCNC: 8 G/DL (ref 6–8.4)
RBC # BLD AUTO: 3.66 M/UL (ref 4–5.4)
SODIUM SERPL-SCNC: 131 MMOL/L (ref 136–145)
WBC # BLD AUTO: 15.46 K/UL (ref 3.9–12.7)

## 2023-10-19 PROCEDURE — 99233 SBSQ HOSP IP/OBS HIGH 50: CPT | Mod: HCNC,,, | Performed by: STUDENT IN AN ORGANIZED HEALTH CARE EDUCATION/TRAINING PROGRAM

## 2023-10-19 PROCEDURE — 25000003 PHARM REV CODE 250: Mod: HCNC | Performed by: FAMILY MEDICINE

## 2023-10-19 PROCEDURE — 84100 ASSAY OF PHOSPHORUS: CPT | Mod: HCNC | Performed by: FAMILY MEDICINE

## 2023-10-19 PROCEDURE — 94640 AIRWAY INHALATION TREATMENT: CPT | Mod: HCNC

## 2023-10-19 PROCEDURE — 99497 ADVNCD CARE PLAN 30 MIN: CPT | Mod: HCNC,,, | Performed by: STUDENT IN AN ORGANIZED HEALTH CARE EDUCATION/TRAINING PROGRAM

## 2023-10-19 PROCEDURE — 99233 PR SUBSEQUENT HOSPITAL CARE,LEVL III: ICD-10-PCS | Mod: HCNC,,, | Performed by: STUDENT IN AN ORGANIZED HEALTH CARE EDUCATION/TRAINING PROGRAM

## 2023-10-19 PROCEDURE — 1153F PR DOC ADVANCED DISEASE DX, GOAL OF CARE DO NOT PRIORITIZE COMFORT: ICD-10-PCS | Mod: HCNC,CPTII,, | Performed by: STUDENT IN AN ORGANIZED HEALTH CARE EDUCATION/TRAINING PROGRAM

## 2023-10-19 PROCEDURE — 85025 COMPLETE CBC W/AUTO DIFF WBC: CPT | Mod: HCNC | Performed by: FAMILY MEDICINE

## 2023-10-19 PROCEDURE — 94761 N-INVAS EAR/PLS OXIMETRY MLT: CPT | Mod: HCNC

## 2023-10-19 PROCEDURE — 36415 COLL VENOUS BLD VENIPUNCTURE: CPT | Mod: HCNC | Performed by: FAMILY MEDICINE

## 2023-10-19 PROCEDURE — 1158F PR ADVANCE CARE PLANNING DISCUSS DOCUMENTED IN MEDICAL RECORD: ICD-10-PCS | Mod: HCNC,CPTII,, | Performed by: STUDENT IN AN ORGANIZED HEALTH CARE EDUCATION/TRAINING PROGRAM

## 2023-10-19 PROCEDURE — 99498 PR ADVNCD CARE PLAN ADDL 30 MIN: ICD-10-PCS | Mod: HCNC,,, | Performed by: STUDENT IN AN ORGANIZED HEALTH CARE EDUCATION/TRAINING PROGRAM

## 2023-10-19 PROCEDURE — 1158F ADVNC CARE PLAN TLK DOCD: CPT | Mod: HCNC,CPTII,, | Performed by: STUDENT IN AN ORGANIZED HEALTH CARE EDUCATION/TRAINING PROGRAM

## 2023-10-19 PROCEDURE — 99900035 HC TECH TIME PER 15 MIN (STAT): Mod: HCNC

## 2023-10-19 PROCEDURE — 99497 PR ADVNCD CARE PLAN 30 MIN: ICD-10-PCS | Mod: HCNC,,, | Performed by: STUDENT IN AN ORGANIZED HEALTH CARE EDUCATION/TRAINING PROGRAM

## 2023-10-19 PROCEDURE — 25000242 PHARM REV CODE 250 ALT 637 W/ HCPCS: Mod: HCNC | Performed by: FAMILY MEDICINE

## 2023-10-19 PROCEDURE — 97116 GAIT TRAINING THERAPY: CPT | Mod: HCNC,CQ

## 2023-10-19 PROCEDURE — 97530 THERAPEUTIC ACTIVITIES: CPT | Mod: HCNC,CO

## 2023-10-19 PROCEDURE — G0425 INPT/ED TELECONSULT30: HCPCS | Mod: HCNC,95,, | Performed by: PSYCHIATRY & NEUROLOGY

## 2023-10-19 PROCEDURE — 11000001 HC ACUTE MED/SURG PRIVATE ROOM: Mod: HCNC

## 2023-10-19 PROCEDURE — 25000003 PHARM REV CODE 250: Mod: HCNC | Performed by: STUDENT IN AN ORGANIZED HEALTH CARE EDUCATION/TRAINING PROGRAM

## 2023-10-19 PROCEDURE — 27000221 HC OXYGEN, UP TO 24 HOURS: Mod: HCNC

## 2023-10-19 PROCEDURE — 1153F DOC ADVNCD DIS CMFRT NOT 1ST: CPT | Mod: HCNC,CPTII,, | Performed by: STUDENT IN AN ORGANIZED HEALTH CARE EDUCATION/TRAINING PROGRAM

## 2023-10-19 PROCEDURE — G0425 PR INPT TELEHEALTH CONSULT 30M: ICD-10-PCS | Mod: HCNC,95,, | Performed by: PSYCHIATRY & NEUROLOGY

## 2023-10-19 PROCEDURE — 80053 COMPREHEN METABOLIC PANEL: CPT | Mod: HCNC | Performed by: FAMILY MEDICINE

## 2023-10-19 PROCEDURE — 97530 THERAPEUTIC ACTIVITIES: CPT | Mod: HCNC,CQ

## 2023-10-19 PROCEDURE — 99498 ADVNCD CARE PLAN ADDL 30 MIN: CPT | Mod: HCNC,,, | Performed by: STUDENT IN AN ORGANIZED HEALTH CARE EDUCATION/TRAINING PROGRAM

## 2023-10-19 PROCEDURE — 63600175 PHARM REV CODE 636 W HCPCS: Mod: HCNC | Performed by: FAMILY MEDICINE

## 2023-10-19 PROCEDURE — 83735 ASSAY OF MAGNESIUM: CPT | Mod: HCNC | Performed by: FAMILY MEDICINE

## 2023-10-19 PROCEDURE — 97535 SELF CARE MNGMENT TRAINING: CPT | Mod: HCNC,CO

## 2023-10-19 RX ADMIN — FLUTICASONE FUROATE AND VILANTEROL TRIFENATATE 1 PUFF: 200; 25 POWDER RESPIRATORY (INHALATION) at 08:10

## 2023-10-19 RX ADMIN — FUROSEMIDE 80 MG: 40 TABLET ORAL at 08:10

## 2023-10-19 RX ADMIN — SEVELAMER CARBONATE 800 MG: 800 TABLET, FILM COATED ORAL at 12:10

## 2023-10-19 RX ADMIN — MIDODRINE HYDROCHLORIDE 15 MG: 5 TABLET ORAL at 06:10

## 2023-10-19 RX ADMIN — MUPIROCIN: 20 OINTMENT TOPICAL at 10:10

## 2023-10-19 RX ADMIN — HEPARIN SODIUM 5000 UNITS: 5000 INJECTION INTRAVENOUS; SUBCUTANEOUS at 08:10

## 2023-10-19 RX ADMIN — NEPHROCAP 1 CAPSULE: 1 CAP ORAL at 08:10

## 2023-10-19 RX ADMIN — HYDROXYZINE HYDROCHLORIDE 25 MG: 25 TABLET ORAL at 04:10

## 2023-10-19 RX ADMIN — ACETAMINOPHEN 325MG 650 MG: 325 TABLET ORAL at 06:10

## 2023-10-19 RX ADMIN — HYDROCODONE BITARTRATE AND ACETAMINOPHEN 1 TABLET: 10; 325 TABLET ORAL at 06:10

## 2023-10-19 RX ADMIN — SEVELAMER CARBONATE 800 MG: 800 TABLET, FILM COATED ORAL at 08:10

## 2023-10-19 RX ADMIN — MEGESTROL ACETATE 20 MG: 20 TABLET ORAL at 10:10

## 2023-10-19 RX ADMIN — HEPARIN SODIUM 5000 UNITS: 5000 INJECTION INTRAVENOUS; SUBCUTANEOUS at 10:10

## 2023-10-19 RX ADMIN — MUPIROCIN: 20 OINTMENT TOPICAL at 08:10

## 2023-10-19 RX ADMIN — SEVELAMER CARBONATE 800 MG: 800 TABLET, FILM COATED ORAL at 04:10

## 2023-10-19 RX ADMIN — IPRATROPIUM BROMIDE AND ALBUTEROL SULFATE 3 ML: 2.5; .5 SOLUTION RESPIRATORY (INHALATION) at 12:10

## 2023-10-19 RX ADMIN — IPRATROPIUM BROMIDE AND ALBUTEROL SULFATE 3 ML: 2.5; .5 SOLUTION RESPIRATORY (INHALATION) at 03:10

## 2023-10-19 RX ADMIN — IPRATROPIUM BROMIDE AND ALBUTEROL SULFATE 3 ML: 2.5; .5 SOLUTION RESPIRATORY (INHALATION) at 07:10

## 2023-10-19 RX ADMIN — MEGESTROL ACETATE 20 MG: 20 TABLET ORAL at 08:10

## 2023-10-19 RX ADMIN — IPRATROPIUM BROMIDE AND ALBUTEROL SULFATE 3 ML: 2.5; .5 SOLUTION RESPIRATORY (INHALATION) at 08:10

## 2023-10-19 RX ADMIN — MIRTAZAPINE 7.5 MG: 7.5 TABLET ORAL at 10:10

## 2023-10-19 RX ADMIN — Medication 6 MG: at 11:10

## 2023-10-19 RX ADMIN — IPRATROPIUM BROMIDE AND ALBUTEROL SULFATE 3 ML: 2.5; .5 SOLUTION RESPIRATORY (INHALATION) at 04:10

## 2023-10-19 NOTE — PT/OT/SLP PROGRESS
"Occupational Therapy   Treatment    Name: Katie Quevedo  MRN: [id number]  Admitting Diagnosis:  Acute on chronic respiratory failure with hypoxia       Recommendations:     Discharge Recommendations: Low Intensity Therapy  Discharge Equipment Recommendations:  bedside commode  Barriers to discharge:  Other (Comment) (increased level of assistance)    Assessment:     Katie Quevedo is a 78 y.o. female with a medical diagnosis of Acute on chronic respiratory failure with hypoxia. Performance deficits affecting function are weakness, impaired endurance, impaired self care skills, impaired functional mobility, gait instability, impaired balance, decreased upper extremity function, decreased lower extremity function, impaired coordination, impaired fine motor.     Rehab Prognosis:  Good; patient would benefit from acute skilled OT services to address these deficits and reach maximum level of function.       Plan:     Decrease POC frequency to 3 x/week to address the above listed problems via self-care/home management, therapeutic activities, therapeutic exercises. Spoke with evaluating OTR who agrees.   Plan of Care Expires: 11/13/23  Plan of Care Reviewed with: patient    Subjective     Chief Complaint: "I am breathing better today"  Patient/Family Comments/goals: Return to PLOF   Pain/Comfort:  Pain Rating 1: 0/10    Objective:     Communicated with: nsg prior to session.  Patient found up in chair with oxygen, PureWick, peripheral IV, telemetry upon OT entry to room.    General Precautions: Standard, fall    Orthopedic Precautions:N/A  Braces: N/A  Respiratory Status: nasal cannula      Occupational Performance:     Functional Mobility/Transfers:  Patient completed Sit <> Stand Transfer with minimum assistance secondary to posterior lean upon standing with  rolling walker.   Patient completed Bed <> Chair Transfer using Step Transfer technique with contact guard assistance and minimum assistance with rolling " walker  Functional Mobility: Pt ambulated from chair to EOB using RW w/Emily due to posterior lean when standing. Pt required frequent VC's for RW management secondary to picking up walker versus pushing.     Activities of Daily Living:  Grooming: stand by assistance Pt completed G/H tasks seated EOB.         [facility] 6 Click ADL: 16    Treatment & Education:  Pt has fair balance and poor safety awareness.   Pt and family educated on PLB and energy conservation techniques.   Pt required frequent rest breaks due to SOB   All questions answered within OT scope of practice     Patient left up in chair with all lines intact, nsg notified, and family present    GOALS:   Multidisciplinary Problems       Occupational Therapy Goals          Problem: Occupational Therapy    Goal Priority Disciplines Outcome Interventions   Occupational Therapy Goal     OT, PT/OT Ongoing, Progressing    Description: Goals to be met by: 11/13/2023     Patient will increase functional independence with ADLs by performing:    Grooming while seated with Set-up Assistance.  Toileting from bedside commode with Stand-by Assistance for hygiene and clothing management.   Rolling to Bilateral with Modified [place].   Supine to sit with Modified [place].  Step transfer with Stand-by Assistance & appropriate AD.  Toilet transfer to bedside commode with Stand-by Assistance & appropriate AD.                         Time Tracking:     OT Date of Treatment: 10/19/23  OT Start Time: 1410  OT Stop Time: 1430  OT Total Time (min): 20 min    Billable Minutes:Self Care/Home Management 10  Therapeutic Activity 10    OT/PAMELA: PAMELA (SOTA)         10/19/2023

## 2023-10-19 NOTE — PT/OT/SLP PROGRESS
Physical Therapy Treatment    Patient Name:  Katie Quevedo   MRN:  [id number]    Recommendations:     Discharge Recommendations: Low Intensity Therapy  Discharge Equipment Recommendations: bedside commode  Barriers to discharge:  Pt requires increased assistance at this time/fall risk    Assessment:     Katie Quevedo is a 78 y.o. female admitted with a medical diagnosis of Acute on chronic respiratory failure with hypoxia.  She presents with the following impairments/functional limitations: weakness, impaired endurance, impaired self care skills, impaired functional mobility, gait instability, impaired balance, decreased lower extremity function, decreased upper extremity function, impaired cardiopulmonary response to activity Pt would continue to benefit from P.T. To address impairments listed above.  .    Rehab Prognosis: Fair; patient would benefit from acute skilled PT services to address these deficits and reach maximum level of function.    Recent Surgery: * No surgery found *      Plan:     During this hospitalization, patient to be seen 5 x/week to address the identified rehab impairments via gait training, therapeutic activities, therapeutic exercises, neuromuscular re-education and progress toward the following goals:    Plan of Care Expires:  11/13/23    Subjective       Patient/Family Comments/goals: Pt agreed to tx.  Pain/Comfort:  Pain Rating 1: 0/10  Pain Rating Post-Intervention 1: 0/10      Objective:     Communicated with Rn prior to session.  Patient found up in chair with oxygen, PureWick, peripheral IV, telemetry upon PT entry to room.     General Precautions: Standard, fall, respiratory  Orthopedic Precautions: N/A  Braces: N/A  Respiratory Status: wall unit O2 NC extended line     Functional Mobility:  Bed Mobility:     Scooting: stand by assistance, contact guard assistance in b/s chair and back into bed.  Sit to Supine: minimum assistance and BLes  Transfers:     Sit to Stand:  minimum assistance  with rolling walker and vc's for hand placement x 2 trials  Chair to EOB: contact guard assistance and minimum assistance with  rolling walker  using  Step Transfer  Gait: 8ft, seated rest, then 4 side steps up to HOB all with RW and CGa/Konrad.  Pt ambulates slowly with trunk flexion and occasional instability as pt become SOB/weak requiring Konrad for increased stability and RW management.  Pt sat EOB ~4 mins after ambulating 8ft secondary to increased SOB.  Pt was instructed in PLB bouts alternating with normal inhalation exhalation.  Pt then took side step up to HOB with RW and CGA/Konrad with vc's for sequencing.  Balance: sitting fair+, standing  fair, gait fair-/fair      AM-PAC 6 CLICK MOBILITY  Turning over in bed (including adjusting bedclothes, sheets and blankets)?: 3  Sitting down on and standing up from a chair with arms (e.g., wheelchair, bedside commode, etc.): 3  Moving from lying on back to sitting on the side of the bed?: 3  Moving to and from a bed to a chair (including a wheelchair)?: 3  Need to walk in hospital room?: 3  Climbing 3-5 steps with a railing?: 1  Basic Mobility Total Score: 16       Treatment & Education:  Pt found sitting up in b/s chair.  Pt stated she had enough time to recover from working with PAMELA earlier.  PTA instructed the pt and her family on energy conservation techniques and self monitoring so that she takes rest breaks when needed and doesn't over push and become too SOB.  Seated BLE therex with LEs elevated: APs, heelslides, hip ABD/ADD x 10 reps with Konrad and vc's for proper technique.  Seated LAQs x 10 reps with occasional assist for proper technique. Brief rest breaks taken as needed secondary to SOB with pt instructed in PLB.  Gait training as above with pt returning to bed supine with HOB elevated at end of tx.  Pt and family educated on elevating BLEs on a pillow with heels floating off for pressure relief.      Patient left HOB elevated with all lines intact, call  button in reach, bed alarm on, RN notified, and family present..    GOALS:   Multidisciplinary Problems       Physical Therapy Goals          Problem: Physical Therapy    Goal Priority Disciplines Outcome Goal Variances Interventions   Physical Therapy Goal     PT, PT/OT Ongoing, Progressing     Description: Goals to be met by: 23     Patient will increase functional independence with mobility by performin. Sit to stand transfer with Supervision  2. Bed to chair transfer with Supervision using RW or rollator  3. Gait  x 50 feet with Supervision using rollator or RW.   4. Lower extremity exercise program x10 reps per handout, with independence                         Time Tracking:     PT Received On: 10/19/23  PT Start Time: 1504     PT Stop Time: 1527  PT Total Time (min): 23 min     Billable Minutes: Gait Training 8 and Therapeutic Activity 15    Treatment Type: Treatment  PT/PTA: PTA     Number of PTA visits since last PT visit: 1     10/19/2023

## 2023-10-19 NOTE — PT/OT/SLP PROGRESS
Physical Therapy      Patient Name:  Katie Quevedo   MRN:  [id number]    1235 -Patient eating at this time. 1430, pt finishing up with PAMELA.  Will return later secondary to pt requiring a rest break after working with PAMELA.   Will follow-up later today.

## 2023-10-19 NOTE — PLAN OF CARE
Problem: Adult Inpatient Plan of Care  Goal: Plan of Care Review  Outcome: Ongoing, Progressing  Goal: Patient-Specific Goal (Individualized)  Outcome: Ongoing, Progressing  Goal: Absence of Hospital-Acquired Illness or Injury  Outcome: Ongoing, Progressing  Goal: Optimal Comfort and Wellbeing  Outcome: Ongoing, Progressing  Goal: Readiness for Transition of Care  Outcome: Ongoing, Progressing     Problem: Adjustment to Illness (Sepsis/Septic Shock)  Goal: Optimal Coping  Outcome: Ongoing, Progressing     Problem: Bleeding (Sepsis/Septic Shock)  Goal: Absence of Bleeding  Outcome: Ongoing, Progressing     Problem: Glycemic Control Impaired (Sepsis/Septic Shock)  Goal: Blood Glucose Level Within Desired Range  Outcome: Ongoing, Progressing     Problem: Infection Progression (Sepsis/Septic Shock)  Goal: Absence of Infection Signs and Symptoms  Outcome: Ongoing, Progressing     Problem: Nutrition Impaired (Sepsis/Septic Shock)  Goal: Optimal Nutrition Intake  Outcome: Ongoing, Progressing     Problem: Fluid Imbalance (Pneumonia)  Goal: Fluid Balance  Outcome: Ongoing, Progressing     Problem: Infection (Pneumonia)  Goal: Resolution of Infection Signs and Symptoms  Outcome: Ongoing, Progressing     Problem: Respiratory Compromise (Pneumonia)  Goal: Effective Oxygenation and Ventilation  Outcome: Ongoing, Progressing     Problem: Skin Injury Risk Increased  Goal: Skin Health and Integrity  Outcome: Ongoing, Progressing     Problem: Fall Injury Risk  Goal: Absence of Fall and Fall-Related Injury  Outcome: Ongoing, Progressing     Problem: Impaired Wound Healing  Goal: Optimal Wound Healing  Outcome: Ongoing, Progressing     Problem: Device-Related Complication Risk (Hemodialysis)  Goal: Safe, Effective Therapy Delivery  Outcome: Ongoing, Progressing     Problem: Hemodynamic Instability (Hemodialysis)  Goal: Effective Tissue Perfusion  Outcome: Ongoing, Progressing     Problem: Infection (Hemodialysis)  Goal: Absence of  Infection Signs and Symptoms  Outcome: Ongoing, Progressing     Problem: Coping Ineffective  Goal: Effective Coping  Outcome: Ongoing, Progressing     Problem: Fluid Volume Excess  Goal: Fluid Balance  Outcome: Ongoing, Progressing     Problem: Activity Intolerance (Pulmonary Impairment)  Goal: Improved Activity Tolerance  Outcome: Ongoing, Progressing     Problem: Airway Clearance Ineffective (Pulmonary Impairment)  Goal: Effective Airway Clearance  Outcome: Ongoing, Progressing     Problem: Gas Exchange Impaired (Pulmonary Impairment)  Goal: Optimal Gas Exchange  Outcome: Ongoing, Progressing     Problem: COPD (Chronic Obstructive Pulmonary Disease) Comorbidity  Goal: Maintenance of COPD Symptom Control  Outcome: Ongoing, Progressing     Problem: Heart Failure Comorbidity  Goal: Maintenance of Heart Failure Symptom Control  Outcome: Ongoing, Progressing     Problem: Hypertension Comorbidity  Goal: Blood Pressure in Desired Range  Outcome: Ongoing, Progressing     Problem: Electrolyte Imbalance (Chronic Kidney Disease)  Goal: Electrolyte Balance  Outcome: Ongoing, Progressing     Problem: Hematologic Alteration (Chronic Kidney Disease)  Goal: Absence of Anemia Signs and Symptoms  Outcome: Ongoing, Progressing     Problem: Renal Function Impairment (Chronic Kidney Disease)  Goal: Minimize Renal Failure Effects  Outcome: Ongoing, Progressing

## 2023-10-19 NOTE — PROGRESS NOTES
Palliative Care Daily Progress Note     S: Medical record reviewed, followed up with pt, RN, primary team and nephrology regarding patient's condition. Yesterday pt lost consciousness during HD and a code blue was called anticipatorily however pt never lost her pulse and regained alertness after HD draw was flushed back. Narrow pulse pressure (90s/70s) was charted during the event however we lack telemetry and there is some uncertainty over what led to this transient LOC and what corrected it.     Family meeting was scheduled for today to discuss pt's worsening intolerance of HD and introduce contingency planning for likely transition to comfort focused care in the near future.    Today a lengthy and fractious family meeting was held today with pt (who is fully alert, bright, with full affect and appropriately tearful at multiple points) and her spouse, daughter, son, in-laws, and grandchildren present as well as two family friends who are MDs (dermatologist Dr. Bree Muse and anesthesiologist Dr. Chen Coffey) participating via speakerphone. Hospitalist, nephrologist, palliative consultant (writer), palliative NP Mariusz and palliative RN Trey were present at bedside representing the care team.    ---    Pt's daughter Chintan acted as family point person and stated the family was aware of a potential recommendation to discontinue further attempts HD and had multiple pointed questions around the topic of whether there are any remaining reversible causes for pt's poor tolerance of HD, summarized below:    - Anxiety / benzo effects: pt has long required benzo treatment pre-HD to suppress SOB and panic attacks however this is documented as having provoked episodes of hypotension limiting HD and there have been prolonged attempts to wean pt off benzos entirely in lieu of e.g. Atarax and Buspirone but pt has continued to require benzos pre-HD. Pt did receive a dose of Ativan yesterday at 0941 per Mar and per  unconfirmed statements by some family members it is possible that pt was also given a dose of her home ativan in close proximity to that dose but overall given pt's lengthy hx of benzo use it is felt unlikely that nondisclosed meds would have led to this sort of very transient LOC given the half life of several hours and the fact that no flumazenil was administered.    Synopsis: any further attempts at HD will be with benzos held to rule out iatrogenic hypotension.    - Opioid/antipsychotic use: MDs on speakerphone inquired about using opioids or neuroleptics (Zyprexa specifically) as an alternative to benzos for light sedation. Palliative (writer) advised them that there is no easy fix here given that opioids can provoke hypotension just as easily as benzos and Zyprexa (along with other QTc prolonging meds) would pose a grave risk for potentially fatal arrhythmias in light of pt's age, cardiovascular disease including baseline EKG abnormalities, and recent events.    Synopsis: any further attempts at HD will not include premedication with opioids or neuroleptics due to similar safety concerns.    - Infection: Radiology's interpretation of pt's initial CXR included a possible parapneumonic effusion however this is very difficult to meaningfully distinguish from compressive atalectasis. MDs on speakerphone requested explanation of pt's persistent leukocytosis which is downtrending and clearly has some component of iatrogenic steroid effects (neutrophil demargination). They invoked 'asymptomatic UTI' as another concern however this is not a recognized clinical entity and is overwhelmingly likely to represent colonization which does not benefit from antibiosis per widely accepted IDSA guidelines. Pt has been appropriately received a full course of CAP abx and given her good mental status and overall appearance better than her recent baseline there is no indication that sepsis or septic shock is implicated in this  recent episode.    Synopsis: pt is not septic at this time and resuming antibiotics would have risks > benefit and would not improve her HD tolerance.    - HD modality: MDs on speakerphone invoked CRRT and other forms of 'light/slow hemodialysis' with the reasoning that pt may have failed HD due to hypotension however pt was normotensive before and after HD. It is impossible to rule out a brief unstable arrhythmia leading to self-resolving cardiogenic syncope which resolved with minimal intervention however this risk would not be ameliorated by CRRT nor does pt have any other urgent indication for HD that would compel a transfer to ICU and CRRT. MDs inquired about a more relaxed HD schedule e.g. weekly or biweekly but as noted by nephro, pt remains above her dry weight due to unrestricted fluids provided by her spouse and in this light there is every reason to expect that pt would only develop worsening fluid overload with this approach and would require readmission in a matter of days.     Synopsis: we simply cannot dialyze pt aggressively enough to remove one week's worth of unrestricted fluid intake during a single session, and indefinite CRRT (almost certainly requiring pressor support given recent experience in ICU) would leave us with no clinical endpoint that would allow for the routine outpt HD that pt and family are ultimately hopeful to resume.    The totality of this difficult discussion is beyond the scope of this note but in sum, pt and family agree to retry HD when clinically warranted without benzo support. Per nephro interpretation of today's labs, no indication for HD today, will dialyze tomorrow in keeping with pt's MWF schedule. If pt cannot tolerate a full session tomorrow without benzos they have been informed by nephrology that we are out of means to safely dialyze pt and HD will be withdrawn on grounds of non-maleficence.    At that point we would have little to offer beyond transition to  "hospice at a setting of pt and family's choosing. Family and MD friends are vocally displeased at "giving up" or "submitting to hospice" and repeatedly state that they believe pt will have a "terrible quality of life" if/when she discontinues HD.     ---    After this discussion, pt's daughter Chintan approached and expressed feelings of gratitude for our patience during this discussion. She states that all of pt's relatives are scared and lashing out but she recognizes that pt's prognosis is terminal  following several years of appropriately aggressive temporizing measures including but not limited to HD. She accepts that we are very likely at the point where HD does more harm than good and appreciates efforts to exhaust reversible causes in order to reassure pt's loved ones that the decision to stop HD is based on medical necessity.    B: Code Status: DNR   Advanced Directives:   DNR/DNI with limited measures including acceptance of HD while clinically warranted  Family/Support: bargaining heavily and not accepting pt's life limiting prognosis   Physician's Plan of Care Goal is HD tomorrow with atarax and buspirone; benzos held; no opioids or neuroleptics  Labs: K+ wnl at 4.8  Diagnostics: noncontributory     Physical Exam  Constitutional:       General: She is not in acute distress.     Appearance: Normal appearance. She is ill-appearing (chronically). She is not toxic-appearing.      Comments: Up to chair   HENT:      Head: Normocephalic and atraumatic.      Comments: Temporal wasting  Cardiovascular:      Rate and Rhythm: Normal rate.      Pulses: Normal pulses.   Pulmonary:      Effort: Pulmonary effort is normal.      Breath sounds: Examination of the right-lower field reveals decreased breath sounds. Examination of the left-lower field reveals decreased breath sounds. Decreased breath sounds present.   Abdominal:      General: Abdomen is flat.      Palpations: Abdomen is soft.   Musculoskeletal:         " General: No swelling.   Skin:     Coloration: Skin is pale.   Neurological:      General: No focal deficit present.      Mental Status: She is alert and oriented to person, place, and time. Mental status is at baseline.      GCS: GCS eye subscore is 4. GCS verbal subscore is 5. GCS motor subscore is 6.   Psychiatric:         Attention and Perception: Attention and perception normal.         Mood and Affect: Mood is anxious. Affect is labile.         Speech: Speech normal.         Behavior: Behavior normal. Behavior is cooperative.         Thought Content: Thought content normal.         Cognition and Memory: Cognition and memory normal.         Judgment: Judgment normal.       A: Patient's current condition guarded.   Patient Symptom Assessment Flowsheet has been completed.   Family is struggling and pre-grieving.  Discharge Planning: TBD pending final determination of HD candidacy; qualified for hospice at any point though family acceptance will remain a paramount concern and pt's [place] should be revised prior to discharge if hospice is her decision    R: Support offered at this time.   Palliative Care Team will continue to follow patient.     Nicolas Ramirez MD  Hospice and Palliative Medicine  Palliative Care Pager: [phone]    Total time spent: 110 minutes  > 50% of 59 minute visit spent in chart review, face to face discussion of symptoms assessment, coordination of care with other specialties, discharge planning.    51 min ACP time spent: goals of care, emotional support, formulating and communicating prognosis, exploring burden/benefit of various approaches of treatment and various avenues to hospice care.

## 2023-10-19 NOTE — NURSING
RAPID RESPONSE NURSE PROACTIVE ROUNDING NOTE       Time of Visit: 0845    Admit Date: 10/12/2023  LOS: 7  Code Status: DNR   Date of Visit: 10/19/2023  : 1945  Age: 78 y.o.  Sex: female  Race: White  Bed: K472/K472 A:   MRN: [id number]  Was the patient discharged from an ICU this admission? No   Was the patient discharged from a PACU within last 24 hours? No   Did the patient receive conscious sedation/general anesthesia in last 24 hours? No   Was the patient in the ED within the past 24 hours? No   Was the patient on NIPPV within the past 24 hours? No   Attending Physician: Domi Pretty MD  Primary Service: Hospitalist   Time spent at the bedside: < 15 min    SITUATION    Notified by previous RRN during handoff  Reason for alert: hypotension, AMS    Diagnosis: Acute on chronic respiratory failure with hypoxia   has a past medical history of Acute congestive heart failure, Anemia, Bilateral renal cysts, Cataract, Chronic LBP, Chronic pain, CKD (chronic kidney disease), stage IV, Colon polyp, COPD (chronic obstructive pulmonary disease), Dehydration, Encounter for blood transfusion, HTN (hypertension), Lumbar spondylosis, Melanoma, Metabolic bone disease, Migraines, neuralgic, Osteoporosis, Primary osteoarthritis of both knees, Pulmonary embolism with infarction, Seizures, Subdeltoid bursitis, L>R., Ulcer, and Vitamin D deficiency disease.    Last Vitals:  Temp: 97.9 °F (36.6 °C) (10/19 0750)  Pulse: 118 (10/19 0800)  Resp: 20 (10/19 0800)  BP: 126/57 (10/19 0750)  SpO2: 95 % (10/19 0800)    24 Hour Vitals Range:  Temp:  [97.4 °F (36.3 °C)-97.9 °F (36.6 °C)]   Pulse:  []   Resp:  [16-22]   BP: ()/(55-83)   SpO2:  [93 %-97 %]     Clinical Issues: Circulatory    ASSESSMENT/INTERVENTIONS    Follow up MD rounding completed. Patient in bed, eating breakfast. Daughter at bedside. Denies any SOB or respiratory distress. Chart and labs reviewed. WNL. VSS. Will continue to monitor and intervene as  necessary     Disposition:Remain in room 472    FOLLOW UP    Call back the Rapid Response NurseOsiel RN at [phone] for additional questions or concerns.

## 2023-10-19 NOTE — SUBJECTIVE & OBJECTIVE
Interval History: sob improved    Review of Systems   All other systems reviewed and are negative.    Objective:     Vital Signs (Most Recent):  Temp: 98.1 °F (36.7 °C) (10/19/23 1623)  Pulse: (!) 121 (10/19/23 1623)  Resp: 18 (10/19/23 1623)  BP: 116/78 (10/19/23 1623)  SpO2: 98 % (10/19/23 1623) Vital Signs (24h Range):  Temp:  [97.6 °F (36.4 °C)-98.1 °F (36.7 °C)] 98.1 °F (36.7 °C)  Pulse:  [113-138] 121  Resp:  [16-22] 18  SpO2:  [93 %-98 %] 98 %  BP: (106-138)/(57-78) 116/78     Weight: 41.5 kg (91 lb 7.9 oz)  Body mass index is 16.73 kg/m².    Intake/Output Summary (Last 24 hours) at 10/19/2023 1725  Last data filed at 10/19/2023 1450  Gross per 24 hour   Intake 555 ml   Output 0 ml   Net 555 ml           Physical Exam  Vitals and nursing note reviewed.   Constitutional:       Appearance: She is well-developed.   HENT:      Head: Normocephalic and atraumatic.      Nose: Nose normal.   Eyes:      Conjunctiva/sclera: Conjunctivae normal.   Cardiovascular:      Rate and Rhythm: Normal rate and regular rhythm.      Heart sounds: Normal heart sounds.   Pulmonary:      Effort: Pulmonary effort is normal.      Breath sounds: Normal breath sounds. No wheezing.   Abdominal:      General: Bowel sounds are normal.      Palpations: Abdomen is soft. There is no mass.      Tenderness: There is no abdominal tenderness. There is no guarding or rebound.   Musculoskeletal:         General: No tenderness. Normal range of motion.      Cervical back: Normal range of motion and neck supple.   Skin:     General: Skin is warm.      Findings: No rash.   Neurological:      Mental Status: She is alert and oriented to person, place, and time.      Cranial Nerves: No cranial nerve deficit.   Psychiatric:         Behavior: Behavior normal.         Thought Content: Thought content normal.             Significant Labs: All pertinent labs within the past 24 hours have been reviewed.  BMP:   Recent Labs   Lab 10/19/23  1329      *    K 4.8   CL 89*   CO2 20*   BUN 81*   CREATININE 8.1*   CALCIUM 9.2   MG 2.5       CBC:   Recent Labs   Lab 10/18/23  0959 10/19/23  1329   WBC 17.52* 15.46*   HGB 12.3 12.1   HCT 37.6 36.7*   * 145*         Significant Imaging: I have reviewed all pertinent imaging results/findings within the past 24 hours.  I have reviewed and interpreted all pertinent imaging results/findings within the past 24 hours.

## 2023-10-19 NOTE — PROGRESS NOTES
ADDENDUM   Today after detailed and extensive discussion  goals of care with Patient herself and her family , children and Family friend physician. After yesterday episode of AMS/LOC on dialysis high concern that she is not able to tolerated Hemodialysis but family and patient is not ready to Proceed with Hospice and asking to eliminate all possible caused of her LOC on HD like medication so all potential meds were stopped. Patient and Family wishes  to try HD with safe parameters we have discussed, if  SBP <90, or if she  become   Confused/unresponsive on Dialysis machine,  dialysis will be terminated and they will proceed with Hospice care.  Labs today    as she missed Dialysis yesterday if Lytes stable HD with UF tomorrow otherwise HD with no UF 2hr today then Full session with UF tomorrow           Nephrology Progress Note       Consult Requested By: Domi Pretty MD  Reason for Consult: ESRD     SUBJECTIVE:            ?    Review of Systems   Constitutional:  Negative for chills, fever and malaise/fatigue.   HENT:  Negative for congestion and sore throat.    Eyes:  Negative for blurred vision, double vision and photophobia.   Respiratory:  Positive for shortness of breath (chronic). Negative for cough.    Cardiovascular:  Negative for chest pain, palpitations and leg swelling.   Gastrointestinal:  Negative for abdominal pain, diarrhea, nausea and vomiting.   Genitourinary:  Negative for dysuria and urgency.   Musculoskeletal:  Negative for joint pain and myalgias.   Skin:  Negative for itching and rash.   Neurological:  Negative for dizziness, sensory change, weakness and headaches.   Endo/Heme/Allergies:  Negative for polydipsia. Does not bruise/bleed easily.   Psychiatric/Behavioral:  Negative for depression. The patient is nervous/anxious.        Past Medical History:   Diagnosis Date    Acute congestive heart failure 02/10/2020    Anemia     Bilateral renal cysts     Cataract     Chronic LBP  7/26/2012    Chronic pain     CKD (chronic kidney disease), stage IV     Colon polyp 2013    COPD (chronic obstructive pulmonary disease)     Dehydration     Encounter for blood transfusion     HTN (hypertension)     Lumbar spondylosis     Melanoma     of the lip    Metabolic bone disease     Migraines, neuralgic     Osteoporosis     Primary osteoarthritis of both knees     s/p Rt TKA    Pulmonary embolism with infarction     Seizures 1972    x1 only    Subdeltoid bursitis, L>R. 9/27/2012    Ulcer     Vitamin D deficiency disease           OBJECTIVE:     Vital Signs (Most Recent)  Vitals:    10/19/23 0543 10/19/23 0601 10/19/23 0750 10/19/23 0800   BP: 138/60  (!) 126/57    BP Location:   Right leg    Patient Position: Lying  Lying    Pulse: (!) 121  (!) 117 (!) 118   Resp: 16 20 19 20   Temp: 97.6 °F (36.4 °C)  97.9 °F (36.6 °C)    TempSrc: Oral      SpO2: (!) 93%  96% 95%   Weight:       Height:                       Medications:          Physical Exam  Vitals and nursing note reviewed.   Constitutional:       General: She is not in acute distress.     Appearance: She is ill-appearing. She is not diaphoretic.      Comments: Frail    HENT:      Head: Normocephalic and atraumatic.      Mouth/Throat:      Pharynx: No oropharyngeal exudate.   Eyes:      General: No scleral icterus.     Conjunctiva/sclera: Conjunctivae normal.      Pupils: Pupils are equal, round, and reactive to light.   Cardiovascular:      Rate and Rhythm: Normal rate and regular rhythm.      Heart sounds: Normal heart sounds. No murmur heard.  Pulmonary:      Effort: No respiratory distress.      Breath sounds: Rales present. No wheezing.   Abdominal:      General: Bowel sounds are normal. There is no distension.      Palpations: Abdomen is soft.      Tenderness: There is no abdominal tenderness.   Musculoskeletal:         General: Normal range of motion.      Cervical back: Normal range of motion and neck supple.      Right lower leg: No edema.     "  Left lower leg: No edema.      Comments:    CVCRIJ    Skin:     General: Skin is warm and dry.      Findings: No erythema.   Neurological:      Mental Status: She is alert and oriented to person, place, and time.      Cranial Nerves: No cranial nerve deficit.   Psychiatric:         Mood and Affect: Affect normal. Mood is anxious.         Behavior: Behavior is agitated.         Cognition and Memory: Memory normal.         Judgment: Judgment normal.         Laboratory:  Recent Labs   Lab 10/16/23  0359 10/17/23  0447 10/18/23  0959   WBC 16.80* 16.79* 17.52*   HGB 9.5* 10.9* 12.3   HCT 29.8* 33.4* 37.6    153 134*   MONO 7.0  1.2* 8.0  1.4* 7.8  1.4*   EOSINOPHIL 0.1 0.2 6.8       Recent Labs   Lab 10/15/23  0433 10/16/23  0359 10/17/23  0447 10/18/23  0959   * 124* 131* 132*   K 5.2* 6.6* 4.6 4.1   CL 92* 88* 90* 89*   CO2 25 19* 23 20*   BUN 58* 85* 33* 64*   CREATININE 5.4* 6.9* 3.6* 6.2*   CALCIUM 8.5* 8.5* 8.9 9.2   PHOS 3.5 4.8* 4.6*  --          Diagnostic Results:  X-Ray: Reviewed  US: Reviewed  Echo: Reviewed  ASSESSMENT/PLAN:     1. ESRD  - HD [facility] With Dr Aura Diggs   --   at her Unit  with minimal pull  due to Hypotension and tachycardia anxiety.   -- Now here again SOB Hyperkalemia volume overload   -- HD with UF  Friday  3L and UF Saturday and Monday 3L   UF Tuesday 2L    Can take lasix on TTSS as its contributing to her hypotension   hold her benzo's before HD to avoid Hypotension - discussed with Patient  -- 10/18 HD  terminated die to patient Hemodynamics and AMS mainly   Her BP lowest was 99/76 patient has uncontrolled Anxiety not sure if her AMS "blackout" was due to hemodynamics but in the same time she woke up after receiving rinsing back blood.   Now pending discussion of GOC with family in the setting of her poor tolerance of HD.         -- Avoid Hypotension.  -- Renally dose all meds    2. HTN (I10) -  Controlled   3. Anemia of chronic kidney disease treated with BILL   "   EPogen  with   HD as needed    Recent Labs   Lab 10/16/23  0359 10/17/23  0447 10/18/23  0959   HGB 9.5* 10.9* 12.3   HCT 29.8* 33.4* 37.6    153 134*         Iron   Lab Results   Component Value Date    IRON 13 (L) 02/12/2020    TIBC 462 (H) 02/12/2020    FERRITIN 148 07/17/2019       4. MBD (E88.9 M90.80) -  Recent Labs   Lab 10/17/23  0447 10/18/23  0959   CALCIUM 8.9 9.2   PHOS 4.6*  --        Recent Labs   Lab 10/12/23  1232 10/12/23  1552   MG 2.2 2.1         Lab Results   Component Value Date    .0 (H) 12/28/2018    CALCIUM 9.2 10/18/2023    PHOS 4.6 (H) 10/17/2023     Lab Results   Component Value Date    TDSCQXFG87YB 26 (L) 02/12/2020     Sevelamer   Lab Results   Component Value Date    CO2 20 (L) 10/18/2023       5. Nutrition/Hypoalbuminemia   Recent Labs   Lab 10/16/23  0359 10/17/23  0447   ALBUMIN 3.4* 3.6       Nepro with meals TID. Renal vitamins daily   .    Thank you for the consult, will follow  With any question please call [phone]  Ramo Da Silva MD    [facility]     NEIL Diggs MD,   MD CURTIS Benoit MD E. V. Harmon, NP    [address]  [place], [address] [phone]

## 2023-10-19 NOTE — PLAN OF CARE
Patient on oxygen with documented flow.  Will attempt to wean per O2 order protocol. The proper method of use, as well as anticipated side effects, of this aerosol treatment are discussed and demonstrated to the patient.  The proper method of use, as well as anticipated side effects, of this metered-dose inhaler are discussed and demonstrated to the patient. Will continue to monitor.

## 2023-10-19 NOTE — PROGRESS NOTES
[facility] Medicine  Progress Note    Patient Name: Katie Quevedo  MRN: [id number]  Patient Class: IP- Inpatient   Admission Date: 10/12/2023  Length of Stay: 7 days  Attending Physician: Domi Pretty MD  Primary Care Provider: Kingston Verduzco MD        Subjective:     Principal Problem:Acute on chronic respiratory failure with hypoxia        HPI:  Katie Quevedo is a 78 y.o.  female who  has a past medical history of Acute congestive heart failure (02/10/2020), Anemia, Bilateral renal cysts, Cataract, Chronic LBP (7/26/2012), Chronic pain, CKD (chronic kidney disease), stage IV, Colon polyp (2013), COPD (chronic obstructive pulmonary disease), Dehydration, Encounter for blood transfusion, HTN (hypertension), Lumbar spondylosis, Melanoma, Metabolic bone disease, Migraines, neuralgic, Osteoporosis, Primary osteoarthritis of both knees, Pulmonary embolism with infarction, Seizures (1972), Subdeltoid bursitis, L>R. (9/27/2012), Ulcer, and Vitamin D deficiency disease.. The patient presented to [facility] on 10/12/2023 with a primary complaint of Shortness of Breath (Pt presents to the ED for sob. Pt presents on a neb mask at 8lpm .  Pt has been hospitalized recently for pneumonia)     The patient was in their usual state of health until 2 days ago when she was discharge from [facility] for respiratory failure 2/2 pneumonia, COPD. She has had 2 other hospital admissions in the last 2 weeks. She was barely able to finish her HD session yesterday. Spoke with Dr. Montemayor, she has been struggling to complete HD sessions over the last 6 weeks and has becoming more and more frail and tachycardic on the machine and there is some concern she will not be able to continue with dialysis. She asked to come to the ER today for worsening shortness of breath. She does not have BiPAP at home. Daughter has not observed any issues with swallowing or episodes concerning for aspiration. She is  on chronic opiates, unclear if patient has been more sedated than usual at home.      Overview/Hospital Course:  Pt offf from levophed and now midodrine is increased. Dialysis per nephro. Palliative consulted and now pt is a DNR.   10/17: 1.5L removed. Complains of cramps after.   10/18: pt was unresponsive on dialysis for 15 min with hypotension. Pt now alert and oriented. Will plan for family meeting tomorrow to discuss goals of care  10/19: Long discussion with goals of care. Pt and family still wants aggressive care. Would like to try dialysis without any benzos or opioids on board which could precipitate the hypotension leading to the unresponsive episode. U/a and cxr repeated to rule out infection per family friend's request      Interval History: sob improved    Review of Systems   All other systems reviewed and are negative.    Objective:     Vital Signs (Most Recent):  Temp: 98.1 °F (36.7 °C) (10/19/23 1623)  Pulse: (!) 121 (10/19/23 1623)  Resp: 18 (10/19/23 1623)  BP: 116/78 (10/19/23 1623)  SpO2: 98 % (10/19/23 1623) Vital Signs (24h Range):  Temp:  [97.6 °F (36.4 °C)-98.1 °F (36.7 °C)] 98.1 °F (36.7 °C)  Pulse:  [113-138] 121  Resp:  [16-22] 18  SpO2:  [93 %-98 %] 98 %  BP: (106-138)/(57-78) 116/78     Weight: 41.5 kg (91 lb 7.9 oz)  Body mass index is 16.73 kg/m².    Intake/Output Summary (Last 24 hours) at 10/19/2023 1725  Last data filed at 10/19/2023 1450  Gross per 24 hour   Intake 555 ml   Output 0 ml   Net 555 ml           Physical Exam  Vitals and nursing note reviewed.   Constitutional:       Appearance: She is well-developed.   HENT:      Head: Normocephalic and atraumatic.      Nose: Nose normal.   Eyes:      Conjunctiva/sclera: Conjunctivae normal.   Cardiovascular:      Rate and Rhythm: Normal rate and regular rhythm.      Heart sounds: Normal heart sounds.   Pulmonary:      Effort: Pulmonary effort is normal.      Breath sounds: Normal breath sounds. No wheezing.   Abdominal:      General:  Bowel sounds are normal.      Palpations: Abdomen is soft. There is no mass.      Tenderness: There is no abdominal tenderness. There is no guarding or rebound.   Musculoskeletal:         General: No tenderness. Normal range of motion.      Cervical back: Normal range of motion and neck supple.   Skin:     General: Skin is warm.      Findings: No rash.   Neurological:      Mental Status: She is alert and oriented to person, place, and time.      Cranial Nerves: No cranial nerve deficit.   Psychiatric:         Behavior: Behavior normal.         Thought Content: Thought content normal.             Significant Labs: All pertinent labs within the past 24 hours have been reviewed.  BMP:   Recent Labs   Lab 10/19/23  1329      *   K 4.8   CL 89*   CO2 20*   BUN 81*   CREATININE 8.1*   CALCIUM 9.2   MG 2.5       CBC:   Recent Labs   Lab 10/18/23  0959 10/19/23  1329   WBC 17.52* 15.46*   HGB 12.3 12.1   HCT 37.6 36.7*   * 145*         Significant Imaging: I have reviewed all pertinent imaging results/findings within the past 24 hours.  I have reviewed and interpreted all pertinent imaging results/findings within the past 24 hours.      Assessment/Plan:      * Acute on chronic respiratory failure with hypoxia  Patient with Hypoxic Respiratory failure which is Acute on chronic.  she is not on home oxygen. Supplemental oxygen was provided and noted- Oxygen Concentration (%):  [25] 25    .   Signs/symptoms of respiratory failure include- respiratory distress. Contributing diagnoses includes - penumonia.  Labs and images were reviewed. Patient Has recent ABG, which has been reviewed. Will treat underlying causes and adjust management of respiratory failure as follows    Multiple admissions for PNA and SOB   CXR Grossly stable appearing cardiopulmonary findings noting pleural effusions and bilateral basilar subsegmental atelectasis.  Developing left lower lung zone consolidation not excluded.    Started on  BiPAP and weaned to 1 L NC  Procal 1.44, elevated from prior  Stop vancomycin  Pulm and Nephro feel this is more due to volume overload due to issues receiving dialysis and fluid removal, L lung findings are less likely recurrent pneumonia  Multiple thoracenteses in the past   ceftriaxone and azithromycin was stopped after 5 days  Monitor without iv abx  Resp cx, BCx  Request home BiPAP/Trelegy    Goals of care, counseling/discussion  Advance Care Planning     Code Status  In light of the patients advanced and life limiting illness,I engaged the the patient in a voluntary conversation about the patient's preferences for care  at the very end of life. The patient wishes to have a natural, peaceful death.  Along those lines, the patient does not wish to have CPR or other invasive treatments performed when her heart and/or breathing stops. I communicated to the patient that a DNR form was completed and will be scanned into EPIC.         Family still wants aggressive care    Community acquired pneumonia of left lower lobe of lung  S/p 5 days of IV abx  Monitor for signs of infection  Low suspicious of pneumonia, but more volume overload      ESRD (end stage renal disease) on dialysis   Nephrology on board  she has been struggling to complete HD sessions over the last 6 weeks and has becoming more and more frail and tachycardic on the machine and there is some concern she will not be able to continue with dialysis  Also said it was okay to place a midline  HD per nephro         VTE Risk Mitigation (From admission, onward)           Ordered     heparin (porcine) injection 5,000 Units  Every 12 hours         10/12/23 1406     IP VTE HIGH RISK PATIENT  Once         10/12/23 1406     Place sequential compression device  Until discontinued         10/12/23 1406     Place sequential compression device  Until discontinued         10/12/23 1406     heparin (porcine) injection 4,100 Units  As needed (PRN)         10/12/23 1406                     Discharge Planning   LAITH:      Code Status: DNR   Is the patient medically ready for discharge?:     Reason for patient still in hospital (select all that apply): Treatment  Discharge Plan A: Home Health                  Domi Pretty MD  Department of [facility]

## 2023-10-20 LAB
ALBUMIN SERPL BCP-MCNC: 3.5 G/DL (ref 3.5–5.2)
ALP SERPL-CCNC: 60 U/L (ref 55–135)
ALT SERPL W/O P-5'-P-CCNC: 40 U/L (ref 10–44)
ANION GAP SERPL CALC-SCNC: 22 MMOL/L (ref 8–16)
AST SERPL-CCNC: 19 U/L (ref 10–40)
BASOPHILS # BLD AUTO: 0.05 K/UL (ref 0–0.2)
BASOPHILS NFR BLD: 0.5 % (ref 0–1.9)
BILIRUB SERPL-MCNC: 0.5 MG/DL (ref 0.1–1)
BUN SERPL-MCNC: 96 MG/DL (ref 8–23)
CALCIUM SERPL-MCNC: 8.6 MG/DL (ref 8.7–10.5)
CHLORIDE SERPL-SCNC: 91 MMOL/L (ref 95–110)
CO2 SERPL-SCNC: 16 MMOL/L (ref 23–29)
CREAT SERPL-MCNC: 9 MG/DL (ref 0.5–1.4)
DIFFERENTIAL METHOD: ABNORMAL
EOSINOPHIL # BLD AUTO: 0.9 K/UL (ref 0–0.5)
EOSINOPHIL NFR BLD: 8.3 % (ref 0–8)
ERYTHROCYTE [DISTWIDTH] IN BLOOD BY AUTOMATED COUNT: 17.5 % (ref 11.5–14.5)
EST. GFR  (NO RACE VARIABLE): 4 ML/MIN/1.73 M^2
GLUCOSE SERPL-MCNC: 86 MG/DL (ref 70–110)
HCT VFR BLD AUTO: 31.9 % (ref 37–48.5)
HGB BLD-MCNC: 10.5 G/DL (ref 12–16)
IMM GRANULOCYTES # BLD AUTO: 0.48 K/UL (ref 0–0.04)
IMM GRANULOCYTES NFR BLD AUTO: 4.7 % (ref 0–0.5)
LYMPHOCYTES # BLD AUTO: 1 K/UL (ref 1–4.8)
LYMPHOCYTES NFR BLD: 9.9 % (ref 18–48)
MAGNESIUM SERPL-MCNC: 2.6 MG/DL (ref 1.6–2.6)
MCH RBC QN AUTO: 33.9 PG (ref 27–31)
MCHC RBC AUTO-ENTMCNC: 32.9 G/DL (ref 32–36)
MCV RBC AUTO: 103 FL (ref 82–98)
MONOCYTES # BLD AUTO: 0.8 K/UL (ref 0.3–1)
MONOCYTES NFR BLD: 8.2 % (ref 4–15)
NEUTROPHILS # BLD AUTO: 7 K/UL (ref 1.8–7.7)
NEUTROPHILS NFR BLD: 68.4 % (ref 38–73)
NRBC BLD-RTO: 0 /100 WBC
PHOSPHATE SERPL-MCNC: 6.9 MG/DL (ref 2.7–4.5)
PLATELET # BLD AUTO: 123 K/UL (ref 150–450)
PMV BLD AUTO: 10.6 FL (ref 9.2–12.9)
POCT GLUCOSE: 110 MG/DL (ref 70–110)
POTASSIUM SERPL-SCNC: 4.8 MMOL/L (ref 3.5–5.1)
PROT SERPL-MCNC: 6.7 G/DL (ref 6–8.4)
RBC # BLD AUTO: 3.1 M/UL (ref 4–5.4)
SODIUM SERPL-SCNC: 129 MMOL/L (ref 136–145)
WBC # BLD AUTO: 10.19 K/UL (ref 3.9–12.7)

## 2023-10-20 PROCEDURE — 11000001 HC ACUTE MED/SURG PRIVATE ROOM: Mod: HCNC

## 2023-10-20 PROCEDURE — 94640 AIRWAY INHALATION TREATMENT: CPT | Mod: HCNC

## 2023-10-20 PROCEDURE — 27100171 HC OXYGEN HIGH FLOW UP TO 24 HOURS: Mod: HCNC

## 2023-10-20 PROCEDURE — 36415 COLL VENOUS BLD VENIPUNCTURE: CPT | Mod: HCNC | Performed by: FAMILY MEDICINE

## 2023-10-20 PROCEDURE — 25000242 PHARM REV CODE 250 ALT 637 W/ HCPCS: Mod: HCNC | Performed by: FAMILY MEDICINE

## 2023-10-20 PROCEDURE — 1152F PR DOC ADVANCED DISEASE DX, GOAL OF CARE PRIORITIZE COMFORT: ICD-10-PCS | Mod: HCNC,CPTII,, | Performed by: STUDENT IN AN ORGANIZED HEALTH CARE EDUCATION/TRAINING PROGRAM

## 2023-10-20 PROCEDURE — 85025 COMPLETE CBC W/AUTO DIFF WBC: CPT | Mod: HCNC | Performed by: FAMILY MEDICINE

## 2023-10-20 PROCEDURE — 99900035 HC TECH TIME PER 15 MIN (STAT): Mod: HCNC

## 2023-10-20 PROCEDURE — 1152F DOC ADVNCD DIS COMFORT 1ST: CPT | Mod: HCNC,CPTII,, | Performed by: STUDENT IN AN ORGANIZED HEALTH CARE EDUCATION/TRAINING PROGRAM

## 2023-10-20 PROCEDURE — 99497 ADVNCD CARE PLAN 30 MIN: CPT | Mod: HCNC,,, | Performed by: STUDENT IN AN ORGANIZED HEALTH CARE EDUCATION/TRAINING PROGRAM

## 2023-10-20 PROCEDURE — 99233 SBSQ HOSP IP/OBS HIGH 50: CPT | Mod: HCNC,,, | Performed by: STUDENT IN AN ORGANIZED HEALTH CARE EDUCATION/TRAINING PROGRAM

## 2023-10-20 PROCEDURE — 25000003 PHARM REV CODE 250: Mod: HCNC | Performed by: STUDENT IN AN ORGANIZED HEALTH CARE EDUCATION/TRAINING PROGRAM

## 2023-10-20 PROCEDURE — 27000221 HC OXYGEN, UP TO 24 HOURS: Mod: HCNC

## 2023-10-20 PROCEDURE — 1158F PR ADVANCE CARE PLANNING DISCUSS DOCUMENTED IN MEDICAL RECORD: ICD-10-PCS | Mod: HCNC,CPTII,, | Performed by: STUDENT IN AN ORGANIZED HEALTH CARE EDUCATION/TRAINING PROGRAM

## 2023-10-20 PROCEDURE — 1158F ADVNC CARE PLAN TLK DOCD: CPT | Mod: HCNC,CPTII,, | Performed by: STUDENT IN AN ORGANIZED HEALTH CARE EDUCATION/TRAINING PROGRAM

## 2023-10-20 PROCEDURE — 99233 PR SUBSEQUENT HOSPITAL CARE,LEVL III: ICD-10-PCS | Mod: HCNC,,, | Performed by: STUDENT IN AN ORGANIZED HEALTH CARE EDUCATION/TRAINING PROGRAM

## 2023-10-20 PROCEDURE — 94660 CPAP INITIATION&MGMT: CPT | Mod: HCNC

## 2023-10-20 PROCEDURE — 63600175 PHARM REV CODE 636 W HCPCS: Mod: HCNC | Performed by: STUDENT IN AN ORGANIZED HEALTH CARE EDUCATION/TRAINING PROGRAM

## 2023-10-20 PROCEDURE — 63600175 PHARM REV CODE 636 W HCPCS: Mod: JZ,HCNC | Performed by: FAMILY MEDICINE

## 2023-10-20 PROCEDURE — 84100 ASSAY OF PHOSPHORUS: CPT | Mod: HCNC | Performed by: FAMILY MEDICINE

## 2023-10-20 PROCEDURE — 80053 COMPREHEN METABOLIC PANEL: CPT | Mod: HCNC | Performed by: FAMILY MEDICINE

## 2023-10-20 PROCEDURE — 80100016 HC MAINTENANCE HEMODIALYSIS: Mod: HCNC

## 2023-10-20 PROCEDURE — 83735 ASSAY OF MAGNESIUM: CPT | Mod: HCNC | Performed by: FAMILY MEDICINE

## 2023-10-20 PROCEDURE — 63600175 PHARM REV CODE 636 W HCPCS: Mod: HCNC | Performed by: INTERNAL MEDICINE

## 2023-10-20 PROCEDURE — 94761 N-INVAS EAR/PLS OXIMETRY MLT: CPT | Mod: HCNC

## 2023-10-20 PROCEDURE — 99497 PR ADVNCD CARE PLAN 30 MIN: ICD-10-PCS | Mod: HCNC,,, | Performed by: STUDENT IN AN ORGANIZED HEALTH CARE EDUCATION/TRAINING PROGRAM

## 2023-10-20 PROCEDURE — 25000003 PHARM REV CODE 250: Mod: HCNC | Performed by: FAMILY MEDICINE

## 2023-10-20 RX ORDER — LORAZEPAM 2 MG/ML
1 INJECTION INTRAMUSCULAR ONCE
Status: COMPLETED | OUTPATIENT
Start: 2023-10-20 | End: 2023-10-20

## 2023-10-20 RX ORDER — MORPHINE SULFATE 4 MG/ML
4 INJECTION, SOLUTION INTRAMUSCULAR; INTRAVENOUS ONCE
Status: COMPLETED | OUTPATIENT
Start: 2023-10-20 | End: 2023-10-20

## 2023-10-20 RX ORDER — ACETAMINOPHEN 500 MG
500 TABLET ORAL ONCE
Status: COMPLETED | OUTPATIENT
Start: 2023-10-20 | End: 2023-10-20

## 2023-10-20 RX ADMIN — MUPIROCIN: 20 OINTMENT TOPICAL at 09:10

## 2023-10-20 RX ADMIN — HEPARIN SODIUM 5000 UNITS: 5000 INJECTION INTRAVENOUS; SUBCUTANEOUS at 08:10

## 2023-10-20 RX ADMIN — FLUTICASONE FUROATE AND VILANTEROL TRIFENATATE 1 PUFF: 200; 25 POWDER RESPIRATORY (INHALATION) at 07:10

## 2023-10-20 RX ADMIN — IPRATROPIUM BROMIDE AND ALBUTEROL SULFATE 3 ML: 2.5; .5 SOLUTION RESPIRATORY (INHALATION) at 12:10

## 2023-10-20 RX ADMIN — IPRATROPIUM BROMIDE AND ALBUTEROL SULFATE 3 ML: 2.5; .5 SOLUTION RESPIRATORY (INHALATION) at 11:10

## 2023-10-20 RX ADMIN — SEVELAMER CARBONATE 800 MG: 800 TABLET, FILM COATED ORAL at 08:10

## 2023-10-20 RX ADMIN — IPRATROPIUM BROMIDE AND ALBUTEROL SULFATE 3 ML: 2.5; .5 SOLUTION RESPIRATORY (INHALATION) at 03:10

## 2023-10-20 RX ADMIN — ACETAMINOPHEN 500 MG: 500 TABLET ORAL at 09:10

## 2023-10-20 RX ADMIN — SEVELAMER CARBONATE 800 MG: 800 TABLET, FILM COATED ORAL at 06:10

## 2023-10-20 RX ADMIN — IPRATROPIUM BROMIDE AND ALBUTEROL SULFATE 3 ML: 2.5; .5 SOLUTION RESPIRATORY (INHALATION) at 07:10

## 2023-10-20 RX ADMIN — IPRATROPIUM BROMIDE AND ALBUTEROL SULFATE 3 ML: 2.5; .5 SOLUTION RESPIRATORY (INHALATION) at 04:10

## 2023-10-20 RX ADMIN — NEPHROCAP 1 CAPSULE: 1 CAP ORAL at 08:10

## 2023-10-20 RX ADMIN — EPOETIN ALFA-EPBX 10000 UNITS: 10000 INJECTION, SOLUTION INTRAVENOUS; SUBCUTANEOUS at 12:10

## 2023-10-20 RX ADMIN — ACETAMINOPHEN 325MG 650 MG: 325 TABLET ORAL at 12:10

## 2023-10-20 RX ADMIN — ACETAMINOPHEN 325MG 650 MG: 325 TABLET ORAL at 05:10

## 2023-10-20 RX ADMIN — MEGESTROL ACETATE 20 MG: 20 TABLET ORAL at 08:10

## 2023-10-20 RX ADMIN — MIDODRINE HYDROCHLORIDE 15 MG: 5 TABLET ORAL at 12:10

## 2023-10-20 RX ADMIN — LORAZEPAM 1 MG: 2 INJECTION INTRAMUSCULAR; INTRAVENOUS at 12:10

## 2023-10-20 RX ADMIN — MORPHINE SULFATE 4 MG: 4 INJECTION INTRAVENOUS at 10:10

## 2023-10-20 RX ADMIN — MIDODRINE HYDROCHLORIDE 15 MG: 5 TABLET ORAL at 08:10

## 2023-10-20 RX ADMIN — MIRTAZAPINE 7.5 MG: 7.5 TABLET ORAL at 09:10

## 2023-10-20 RX ADMIN — HEPARIN SODIUM 5000 UNITS: 5000 INJECTION INTRAVENOUS; SUBCUTANEOUS at 09:10

## 2023-10-20 RX ADMIN — MUPIROCIN: 20 OINTMENT TOPICAL at 08:10

## 2023-10-20 RX ADMIN — BUSPIRONE HYDROCHLORIDE 10 MG: 5 TABLET ORAL at 08:10

## 2023-10-20 RX ADMIN — MIDODRINE HYDROCHLORIDE 15 MG: 5 TABLET ORAL at 06:10

## 2023-10-20 RX ADMIN — MEGESTROL ACETATE 20 MG: 20 TABLET ORAL at 09:10

## 2023-10-20 NOTE — PT/OT/SLP PROGRESS
Occupational Therapy      Patient Name:  Katie Quevedo   MRN:  [id number]    Patient not seen today secondary to Dialysis (AM 1111). PM hold: MET called in dialysis.     10/20/2023

## 2023-10-20 NOTE — SIGNIFICANT EVENT
Called by Dialysis Nurse that patient became unresponsive, not able to record BP, dialysis stopped, rinsed back BP improved patient regained  consciousness. Unfortunately patient is not tolerating dialysis, per prior discussion with family  10/19 will no longer perform dialysis and focus on comfort.

## 2023-10-20 NOTE — PROGRESS NOTES
[facility] Medicine  Progress Note    Patient Name: Katie Quevedo  MRN: [id number]  Patient Class: IP- Inpatient   Admission Date: 10/12/2023  Length of Stay: 8 days  Attending Physician: Domi Pretty MD  Primary Care Provider: Kingston Verduzco MD        Subjective:     Principal Problem:Acute on chronic respiratory failure with hypoxia        HPI:  Katie Quevedo is a 78 y.o.  female who  has a past medical history of Acute congestive heart failure (02/10/2020), Anemia, Bilateral renal cysts, Cataract, Chronic LBP (7/26/2012), Chronic pain, CKD (chronic kidney disease), stage IV, Colon polyp (2013), COPD (chronic obstructive pulmonary disease), Dehydration, Encounter for blood transfusion, HTN (hypertension), Lumbar spondylosis, Melanoma, Metabolic bone disease, Migraines, neuralgic, Osteoporosis, Primary osteoarthritis of both knees, Pulmonary embolism with infarction, Seizures (1972), Subdeltoid bursitis, L>R. (9/27/2012), Ulcer, and Vitamin D deficiency disease.. The patient presented to [facility] Medicine on 10/12/2023 with a primary complaint of Shortness of Breath (Pt presents to the ED for sob. Pt presents on a neb mask at 8lpm .  Pt has been hospitalized recently for pneumonia)     The patient was in their usual state of health until 2 days ago when she was discharge from [facility] for respiratory failure 2/2 pneumonia, COPD. She has had 2 other hospital admissions in the last 2 weeks. She was barely able to finish her HD session yesterday. Spoke with Dr. Montemayor, she has been struggling to complete HD sessions over the last 6 weeks and has becoming more and more frail and tachycardic on the machine and there is some concern she will not be able to continue with dialysis. She asked to come to the ER today for worsening shortness of breath. She does not have BiPAP at home. Daughter has not observed any issues with swallowing or episodes concerning for aspiration. She is  on chronic opiates, unclear if patient has been more sedated than usual at home.      Overview/Hospital Course:  Pt offf from levophed and now midodrine is increased. Dialysis per nephro. Palliative consulted and now pt is a DNR.   10/17: 1.5L removed. Complains of cramps after.   10/18: pt was unresponsive on dialysis for 15 min with hypotension. Pt now alert and oriented. Will plan for family meeting tomorrow to discuss goals of care  10/19: Long discussion with goals of care. Pt and family still wants aggressive care. Would like to try dialysis without any benzos or opioids on board which could precipitate the hypotension leading to the unresponsive episode. U/a and cxr repeated to rule out infection per family friend's request  10/20: WBCS trended down to normal. U/a pending. CXR unchanged. Pt underwent HD today without benzos. After 90 minutes of HD, pt became unresponsive with barely palpable pulses ,not able to record BP, dialysis stopped.Rinsed back with BP improved and patient regained  consciousness. Unfortunately patient is not tolerating dialysis and will no longer be able to receive dialysis. To focus on comfort. Ancticipate D/c in am with hospice      Interval History: In good spirit prior to dialysis    Review of Systems   All other systems reviewed and are negative.    Objective:     Vital Signs (Most Recent):  Temp: 97.6 °F (36.4 °C) (10/20/23 1051)  Pulse: (!) 112 (10/20/23 1618)  Resp: (!) 38 (10/20/23 1539)  BP: (!) 170/65 (10/20/23 1250)  SpO2: (!) 94 % (10/20/23 1539) Vital Signs (24h Range):  Temp:  [96.1 °F (35.6 °C)-98.5 °F (36.9 °C)] 97.6 °F (36.4 °C)  Pulse:  [] 112  Resp:  [16-38] 38  SpO2:  [94 %-98 %] 94 %  BP: (100-170)/(51-78) 170/65     Weight: 40.5 kg (89 lb 5.1 oz)  Body mass index is 16.34 kg/m².    Intake/Output Summary (Last 24 hours) at 10/20/2023 1620  Last data filed at 10/20/2023 0500  Gross per 24 hour   Intake 490 ml   Output 0 ml   Net 490 ml           Physical  Exam  Vitals and nursing note reviewed.   Constitutional:       Appearance: She is well-developed.   HENT:      Head: Normocephalic and atraumatic.      Nose: Nose normal.   Eyes:      Conjunctiva/sclera: Conjunctivae normal.   Cardiovascular:      Rate and Rhythm: Normal rate and regular rhythm.      Heart sounds: Normal heart sounds.   Pulmonary:      Effort: Pulmonary effort is normal.      Breath sounds: Normal breath sounds. No wheezing.   Abdominal:      General: Bowel sounds are normal.      Palpations: Abdomen is soft. There is no mass.      Tenderness: There is no abdominal tenderness. There is no guarding or rebound.   Musculoskeletal:         General: No tenderness. Normal range of motion.      Cervical back: Normal range of motion and neck supple.   Skin:     General: Skin is warm.      Findings: No rash.   Neurological:      Mental Status: She is alert and oriented to person, place, and time.      Cranial Nerves: No cranial nerve deficit.   Psychiatric:         Behavior: Behavior normal.         Thought Content: Thought content normal.             Significant Labs: All pertinent labs within the past 24 hours have been reviewed.  BMP:   Recent Labs   Lab 10/20/23  0434   GLU 86   *   K 4.8   CL 91*   CO2 16*   BUN 96*   CREATININE 9.0*   CALCIUM 8.6*   MG 2.6       CBC:   Recent Labs   Lab 10/19/23  1329 10/20/23  0434   WBC 15.46* 10.19   HGB 12.1 10.5*   HCT 36.7* 31.9*   * 123*         Significant Imaging: I have reviewed all pertinent imaging results/findings within the past 24 hours.  I have reviewed and interpreted all pertinent imaging results/findings within the past 24 hours.      Assessment/Plan:      * Acute on chronic respiratory failure with hypoxia  Patient with Hypoxic Respiratory failure which is Acute on chronic.  she is not on home oxygen. Supplemental oxygen was provided and noted- Oxygen Concentration (%):  [25-40] 25    .   Signs/symptoms of respiratory failure include-  respiratory distress. Contributing diagnoses includes - penumonia.  Labs and images were reviewed. Patient Has recent ABG, which has been reviewed. Will treat underlying causes and adjust management of respiratory failure as follows    Multiple admissions for PNA and SOB   CXR Grossly stable appearing cardiopulmonary findings noting pleural effusions and bilateral basilar subsegmental atelectasis.  Developing left lower lung zone consolidation not excluded.    Started on BiPAP and weaned to 1 L NC  Procal 1.44, elevated from prior  Stop vancomycin  Pulm and Nephro feel this is more due to volume overload due to issues receiving dialysis and fluid removal, L lung findings are less likely recurrent pneumonia  Multiple thoracenteses in the past   ceftriaxone and azithromycin was stopped  Monitor without iv abx  Resp cx, BCx NGTD  Leukocytosis resolved      Goals of care, counseling/discussion  Advance Care Planning     Code Status  In light of the patients advanced and life limiting illness,I engaged the the patient in a voluntary conversation about the patient's preferences for care  at the very end of life. The patient wishes to have a natural, peaceful death.  Along those lines, the patient does not wish to have CPR or other invasive treatments performed when her heart and/or breathing stops. I communicated to the patient that a DNR form was completed and will be scanned into EPIC.      Pt not tolerating dialysis  Palliative on board  Anticipate hospice in am       Family discussion to be held tomorrow     Community acquired pneumonia of left lower lobe of lung  S/p 5 days of treatment  Pneumonia ruled out  Leukocytosis most likely from steroids which was also stopped      ESRD (end stage renal disease) on dialysis   Nephrology on board  she has been struggling to complete HD sessions over the last 6 weeks and has becoming more and more frail and tachycardic on the machine and there is some concern she will not be  able to continue with dialysis  Attempted dialysis again without any benzos to which pt became unresponsive  Palliative on board  Anticipate d/c in am       VTE Risk Mitigation (From admission, onward)         Ordered     heparin (porcine) injection 5,000 Units  Every 12 hours         10/12/23 1406     IP VTE HIGH RISK PATIENT  Once         10/12/23 1406     Place sequential compression device  Until discontinued         10/12/23 1406     Place sequential compression device  Until discontinued         10/12/23 1406     heparin (porcine) injection 4,100 Units  As needed (PRN)         10/12/23 1406                Discharge Planning   LAITH:      Code Status: DNR   Is the patient medically ready for discharge?:     Reason for patient still in hospital (select all that apply): Treatment  Discharge Plan A: Home Health                  Domi Pretty MD  Department of [facility] Medicine   [facility]

## 2023-10-20 NOTE — PT/OT/SLP PROGRESS
Physical Therapy      Patient Name:  Katie Quevedo   MRN:  [id number]    Patient not seen today secondary to  (MET called on pt while in dialysis,no rx provided today(7018)). Will follow-up tomorrow.

## 2023-10-20 NOTE — PLAN OF CARE
10/20/23 1455   Post-Acute Status   Post-Acute Authorization Other   Other Status See Comments  (Pending family discussions after HD had to be stopped due to MET being called. pending DC dispo.)

## 2023-10-20 NOTE — ASSESSMENT & PLAN NOTE
Advance Care Planning     Code Status  In light of the patients advanced and life limiting illness,I engaged the the patient in a voluntary conversation about the patient's preferences for care  at the very end of life. The patient wishes to have a natural, peaceful death.  Along those lines, the patient does not wish to have CPR or other invasive treatments performed when her heart and/or breathing stops. I communicated to the patient that a DNR form was completed and will be scanned into EPIC.      Pt not tolerating dialysis  Palliative on board  Anticipate hospice in am        Family discussion to be held tomorrow

## 2023-10-20 NOTE — ASSESSMENT & PLAN NOTE
Nephrology on board  she has been struggling to complete HD sessions over the last 6 weeks and has becoming more and more frail and tachycardic on the machine and there is some concern she will not be able to continue with dialysis  Attempted dialysis again without any benzos to which pt became unresponsive  Palliative on board  Anticipate d/c in am

## 2023-10-20 NOTE — SUBJECTIVE & OBJECTIVE
Interval History: In good spirit prior to dialysis    Review of Systems   All other systems reviewed and are negative.    Objective:     Vital Signs (Most Recent):  Temp: 97.6 °F (36.4 °C) (10/20/23 1051)  Pulse: (!) 112 (10/20/23 1618)  Resp: (!) 38 (10/20/23 1539)  BP: (!) 170/65 (10/20/23 1250)  SpO2: (!) 94 % (10/20/23 1539) Vital Signs (24h Range):  Temp:  [96.1 °F (35.6 °C)-98.5 °F (36.9 °C)] 97.6 °F (36.4 °C)  Pulse:  [] 112  Resp:  [16-38] 38  SpO2:  [94 %-98 %] 94 %  BP: (100-170)/(51-78) 170/65     Weight: 40.5 kg (89 lb 5.1 oz)  Body mass index is 16.34 kg/m².    Intake/Output Summary (Last 24 hours) at 10/20/2023 1620  Last data filed at 10/20/2023 0500  Gross per 24 hour   Intake 490 ml   Output 0 ml   Net 490 ml           Physical Exam  Vitals and nursing note reviewed.   Constitutional:       Appearance: She is well-developed.   HENT:      Head: Normocephalic and atraumatic.      Nose: Nose normal.   Eyes:      Conjunctiva/sclera: Conjunctivae normal.   Cardiovascular:      Rate and Rhythm: Normal rate and regular rhythm.      Heart sounds: Normal heart sounds.   Pulmonary:      Effort: Pulmonary effort is normal.      Breath sounds: Normal breath sounds. No wheezing.   Abdominal:      General: Bowel sounds are normal.      Palpations: Abdomen is soft. There is no mass.      Tenderness: There is no abdominal tenderness. There is no guarding or rebound.   Musculoskeletal:         General: No tenderness. Normal range of motion.      Cervical back: Normal range of motion and neck supple.   Skin:     General: Skin is warm.      Findings: No rash.   Neurological:      Mental Status: She is alert and oriented to person, place, and time.      Cranial Nerves: No cranial nerve deficit.   Psychiatric:         Behavior: Behavior normal.         Thought Content: Thought content normal.             Significant Labs: All pertinent labs within the past 24 hours have been reviewed.  BMP:   Recent Labs   Lab  10/20/23  0434   GLU 86   *   K 4.8   CL 91*   CO2 16*   BUN 96*   CREATININE 9.0*   CALCIUM 8.6*   MG 2.6       CBC:   Recent Labs   Lab 10/19/23  1329 10/20/23  0434   WBC 15.46* 10.19   HGB 12.1 10.5*   HCT 36.7* 31.9*   * 123*         Significant Imaging: I have reviewed all pertinent imaging results/findings within the past 24 hours.  I have reviewed and interpreted all pertinent imaging results/findings within the past 24 hours.

## 2023-10-20 NOTE — PT/OT/SLP PROGRESS
Physical Therapy Visit Attempt      Patient Name:  Katie Quevedo   MRN:  [id number]    Patient not seen today secondary to Dialysis. Will follow-up as able.    10/20/2023

## 2023-10-20 NOTE — CONSULTS
[facility]  Psychiatry  Telepsychiatry Consult Note    Please see previous notes:    Patient agreeable to consultation via telepsychiatry.    Tele-Consultation from Psychiatry started: 10/19/2023 at 830  The chief complaint leading to psychiatric consultation is: anxiety  This consultation was requested by attending physician.  The location of the consulting psychiatrist is  [facility] .  The patient location is  [facility] TELEMETRY UNIT     Also present with the patient at the time of the consultation: pt family and rn    Patient Identification:   Katie Quevedo is a 78 y.o. female.    Patient information was obtained from patient and relative(s).    Consults  Teleconsult Time Documentation  Subjective:     History of Present Illness:  No notes on file Katie Quevedo is a 78 y.o.  female who  has a past medical history of Acute congestive heart failure (02/10/2020), Anemia, Bilateral renal cysts, Cataract, Chronic LBP (7/26/2012), Chronic pain, CKD (chronic kidney disease), stage IV, Colon polyp (2013), COPD (chronic obstructive pulmonary disease), Dehydration, Encounter for blood transfusion, HTN (hypertension), Lumbar spondylosis, Melanoma, Metabolic bone disease, Migraines, neuralgic, Osteoporosis, Primary osteoarthritis of both knees, Pulmonary embolism with infarction, Seizures (1972), Subdeltoid bursitis, L>R. (9/27/2012), Ulcer, and Vitamin D deficiency disease.. The patient presented to [facility] Medicine on 10/12/2023 with a primary complaint of Shortness of Breath (Pt presents to the ED for sob    Psychiatric History:   Previous Psychiatric Hospitalizations: No   Previous Medication Trials: Yes   Previous Suicide Attempts: no   History of Violence: no  History of Depression: no  History of Consuelo: no  History of Auditory/Visual Hallucination no  History of Delusions: no  Outpatient psychiatrist (current & past): No    Substance Abuse History:  Tobacco:No  Alcohol: No  Illicit  "Substances:No  Detox/Rehab: No    Legal History: Past charges/incarcerations: No     Family Psychiatric History: denies      Social History:  Developmental/Childhood:Achieved all developmental milestones timely  *Education:High School Diploma  Employment Status/Finances:Retired   Relationship Status/Sexual Orientation: : Relationship intact  Children: 1  Housing Status: Home    history:  NO  Access to gun: NO  Yazidism:Actively participates in organized [employer]  Recreational activities:Time with family    Psychiatric Mental Status Exam:  Arousal: alert  Sensorium/Orientation: oriented to grossly intact  Behavior/Cooperation: normal, cooperative   Speech: normal tone, normal rate, normal pitch, normal volume  Language: grossly intact  Mood: " ok "   Affect: appropriate  Thought Process: normal and logical  Thought Content:   Auditory hallucinations: NO  Visual hallucinations: NO  Paranoia: NO  Delusions:  NO  Suicidal ideation: NO  Homicidal ideation: NO  Attention/Concentration:  intact  Memory:    Recent:  Intact   Remote: Intact   3/3 immediate, 3/3 at 5 min  Fund of Knowledge: Intact   Abstract reasoning: proverbs were abstract  Insight: intact  Judgment: behavior is adequate to circumstances      Past Medical History:   Past Medical History:   Diagnosis Date    Acute congestive heart failure 02/10/2020    Anemia     Bilateral renal cysts     Cataract     Chronic LBP 7/26/2012    Chronic pain     CKD (chronic kidney disease), stage IV     Colon polyp 2013    COPD (chronic obstructive pulmonary disease)     Dehydration     Encounter for blood transfusion     HTN (hypertension)     Lumbar spondylosis     Melanoma     of the lip    Metabolic bone disease     Migraines, neuralgic     Osteoporosis     Primary osteoarthritis of both knees     s/p Rt TKA    Pulmonary embolism with infarction     Seizures 1972    x1 only    Subdeltoid bursitis, L>R. 9/27/2012    Ulcer     Vitamin D deficiency disease     "   Laboratory Data:   Labs Reviewed   CBC W/ AUTO DIFFERENTIAL - Abnormal; Notable for the following components:       Result Value    WBC 16.24 (*)     RBC 3.63 (*)     Hemoglobin 11.9 (*)      (*)     MCH 32.8 (*)     RDW 14.6 (*)     Immature Granulocytes 0.9 (*)     Gran # (ANC) 12.6 (*)     Immature Grans (Abs) 0.14 (*)     Mono # 1.4 (*)     Gran % 77.6 (*)     Lymph % 10.0 (*)     All other components within normal limits   BASIC METABOLIC PANEL - Abnormal; Notable for the following components:    BUN 36 (*)     Creatinine 4.3 (*)     eGFR 10 (*)     All other components within normal limits   TROPONIN I - Abnormal; Notable for the following components:    Troponin I 0.172 (*)     All other components within normal limits   B-TYPE NATRIURETIC PEPTIDE - Abnormal; Notable for the following components:     (*)     All other components within normal limits   PROCALCITONIN - Abnormal; Notable for the following components:    Procalcitonin 1.44 (*)     All other components within normal limits   MAGNESIUM   B-TYPE NATRIURETIC PEPTIDE   LACTIC ACID, PLASMA   INFLUENZA A AND B ANTIGEN   SARS-COV-2 RDRP GENE   POCT INFLUENZA A/B MOLECULAR       Neurological History:  Seizures: No  Head trauma: No    Allergies:   Review of patient's allergies indicates:   Allergen Reactions    Aspirin      Other reaction(s): hx of ulcers    Tetracycline Swelling     Other reaction(s): Swelling    Penicillins Rash     Other reaction(s): Hives  Other reaction(s): Rash  Other reaction(s): Rash  Other reaction(s): Hives       Medications in ER:   Medications   sodium chloride 0.9% flush 10 mL (has no administration in time range)   acetaminophen tablet 650 mg (650 mg Oral Given 10/19/23 1813)   megestroL tablet 20 mg (20 mg Oral Given 10/19/23 0846)   mirtazapine tablet 7.5 mg (7.5 mg Oral Given 10/18/23 2132)   sevelamer carbonate tablet 800 mg (800 mg Oral Given 10/19/23 1640)   busPIRone tablet 10 mg (10 mg Oral Given  10/18/23 0853)   sodium chloride 0.9% bolus 250 mL 250 mL (has no administration in time range)   melatonin tablet 6 mg (6 mg Oral Given 10/17/23 2103)   heparin (porcine) injection 4,100 Units (4,100 Units Intravenous Given 10/17/23 1434)   sodium chloride 0.9% flush 2 mL (has no administration in time range)   heparin (porcine) injection 5,000 Units (5,000 Units Subcutaneous Given 10/19/23 0845)   albuterol sulfate nebulizer solution 2.5 mg (0 mg Nebulization Hold 10/13/23 1059)   0.9%  NaCl infusion ( Intravenous Verify Only 10/13/23 1500)   predniSONE tablet 40 mg (40 mg Oral Given 10/16/23 0839)   fluticasone furoate-vilanteroL 200-25 mcg/dose diskus inhaler 1 puff (1 puff Inhalation Given 10/19/23 0800)   albuterol-ipratropium 2.5 mg-0.5 mg/3 mL nebulizer solution 3 mL (3 mLs Nebulization Given 10/19/23 1939)   furosemide tablet 80 mg (80 mg Oral Given 10/19/23 0846)   sodium chloride 0.9% bolus 250 mL 250 mL (has no administration in time range)   epoetin hemant-epbx injection 10,000 Units (10,000 Units Subcutaneous Given 10/16/23 0839)   midodrine tablet 15 mg (15 mg Oral Given 10/19/23 1813)   sodium chloride 0.9% bolus 250 mL 250 mL (has no administration in time range)   albumin human 25% bottle 25 g (0 g Intravenous Stopped 10/17/23 1526)   sodium chloride 0.9% bolus 250 mL 250 mL (has no administration in time range)   carbamide peroxide 6.5 % otic solution 5 drop (5 drops Both Ears Given 10/14/23 2106)   hydrOXYzine HCL tablet 25 mg (25 mg Oral Given 10/19/23 1642)   vitamin renal formula (B-complex-vitamin c-folic acid) 1 mg per capsule 1 capsule (1 capsule Oral Given 10/19/23 0845)   ondansetron injection 4 mg (4 mg Intravenous Given 10/17/23 1249)   mupirocin 2 % ointment ( Nasal Given 10/19/23 0846)   0.9%  NaCl infusion (has no administration in time range)   sodium chloride 0.9% bolus 250 mL 250 mL (has no administration in time range)   albuterol sulfate nebulizer solution 10 mg (10 mg  Nebulization Given 10/12/23 1209)   ipratropium 0.02 % nebulizer solution 0.5 mg (0.5 mg Nebulization Given 10/12/23 1209)   methylPREDNISolone sodium succinate injection 80 mg (80 mg Intravenous Given 10/12/23 1238)   mupirocin 2 % ointment ( Nasal Given 10/15/23 2041)   cefTRIAXone (ROCEPHIN) 2 g in dextrose 5 % in water (D5W) 100 mL IVPB (MB+) (0 g Intravenous Stopped 10/16/23 1603)   vancomycin (VANCOCIN) 1,000 mg in dextrose 5 % (D5W) 250 mL IVPB (Vial-Mate) (0 mg Intravenous Stopped 10/13/23 0054)   iohexoL (OMNIPAQUE 350) injection 100 mL (100 mLs Intravenous Given 10/12/23 1745)   calcium gluconate 1 g in NS IVPB (premixed) (0 g Intravenous Stopped 10/13/23 0727)   sodium chloride 0.9% bolus 250 mL 250 mL (0 mLs Intravenous Stopped 10/14/23 0405)   albumin human 25% bottle 25 g (0 g Intravenous Stopped 10/14/23 0328)   sodium zirconium cyclosilicate packet 10 g (10 g Oral Given 10/16/23 0527)   0.9%  NaCl infusion (0 mL/hr Intravenous Stopped 10/17/23 1500)       Medications at home: buspar, vistaril    No new subjective & objective note has been filed under this hospital service since the last note was generated.      Assessment - Diagnosis - Goals:     Diagnosis/Impression: panic attacks vs specific phobia.     Rec: CBT and outpatient therapy, hold benzodiazepines due to exacerbation of underlying medical conditions as well as risk of rebound anxiety further precipitating pattern of panic attacks surrounding HD. Patient describes panic attacks as limited specifically to HD and does not influence other areas of her life or functions. Patients states hx of isolated panic attacks roughly 30 years ago.      Time with patient: 20 min      More than 50% of the time was spent counseling/coordinating care    Consulting clinician was informed of the encounter and consult note.    Consultation ended: 10/19/2023 at 0941    Julien Mustafa MD   Psychiatry  [facility]

## 2023-10-20 NOTE — NURSING
RAPID RESPONSE NURSE PROACTIVE ROUNDING NOTE       Time of Visit: 1246    Admit Date: 10/12/2023  LOS: 8  Code Status: DNR   Date of Visit: 10/20/2023  : 1945  Age: 78 y.o.  Sex: female  Race: White  Bed: K472/K472 A:   MRN: [id number]  Was the patient discharged from an ICU this admission? No   Was the patient discharged from a PACU within last 24 hours? No   Did the patient receive conscious sedation/general anesthesia in last 24 hours? No   Was the patient in the ED within the past 24 hours? No   Was the patient on NIPPV within the past 24 hours? No   Attending Physician: Domi Pretty MD  Primary Service: Hospitalist   Time spent at the bedside: 15 -30 min    SITUATION    Notified by overhead page  Reason for alert: unresponsive on dialysis, hypoTN    Diagnosis: Acute on chronic respiratory failure with hypoxia   has a past medical history of Acute congestive heart failure, Anemia, Bilateral renal cysts, Cataract, Chronic LBP, Chronic pain, CKD (chronic kidney disease), stage IV, Colon polyp, COPD (chronic obstructive pulmonary disease), Dehydration, Encounter for blood transfusion, HTN (hypertension), Lumbar spondylosis, Melanoma, Metabolic bone disease, Migraines, neuralgic, Osteoporosis, Primary osteoarthritis of both knees, Pulmonary embolism with infarction, Seizures, Subdeltoid bursitis, L>R., Ulcer, and Vitamin D deficiency disease.    Last Vitals:  Temp: 97.6 °F (36.4 °C) (10/20 1051)  Pulse: 115 (10/20 1230)  Resp: 22 (10/20 1051)  BP: 110/51 (10/20 1230)  SpO2: 97 % (10/20 0803)    24 Hour Vitals Range:  Temp:  [96.1 °F (35.6 °C)-98.5 °F (36.9 °C)]   Pulse:  []   Resp:  [16-22]   BP: (100-132)/(51-78)   SpO2:  [95 %-98 %]     Clinical Issues: Circulatory    ASSESSMENT/INTERVENTIONS    Upon arrival pt awake but drowsy, tachypenic and visibly anxious. Unable to obtain BP. Per dialysis nurse pt became unresponsive during treatment and she was unable to obtain a BP. Last one taken at 1230  read 110/51. Pt was rinsed back by dialysis nurse. BP after rinse back showed systolic in the 180's. Pt remained anxious. Dr. Patiño ordered 1 mg of ativan. Ativan was given. Pt stated she felt better after administration on Ativan.     RECOMMENDATIONS  Dr. Patiño discussed goals of care with family and nephrologist. Decision made to stop dialysis.     Discussed plan of care with bedside RNElo    PROVIDER ESCALATION    Physician escalation: Yes    Orders received and case discussed with Dr. Patiño .    Disposition:Remain in room 472    FOLLOW UP    Call back the Rapid Response NurseIvelisse at [phone] for additional questions or concerns.

## 2023-10-20 NOTE — PROCEDURES
"Patient seen   on HD tolerating well so far . No complains at the moment.   If able to tolerate HD today with, from Nephrology standpoint can be discharge and resume her dialysis at her unit. Now off benzo's and pain meds     BP (!) 119/57 (BP Location: Right arm, Patient Position: Lying)   Pulse 102   Temp 96.1 °F (35.6 °C) (Oral)   Resp 20   Ht 5' 2" (1.575 m)   Wt 40.5 kg (89 lb 5.1 oz)   LMP  (LMP Unknown)   SpO2 97%   Breastfeeding No   BMI 16.34 kg/m²     With any question please call answering service [phone]  Ramo Da Silva MD    [facility]   NEIL Diggs MD,   MD CURTIS Benoit MD E. V. Harmon, NP    [address]  [place], [address]  "

## 2023-10-20 NOTE — PLAN OF CARE
Patient on oxygen in no apparent distress, given aerosol and MDI treatment, no adverse reactions will continue to monitor.

## 2023-10-20 NOTE — NURSING
RAPID RESPONSE NURSE PROACTIVE ROUNDING NOTE       Time of Visit: 1130    Admit Date: 10/12/2023  LOS: 8  Code Status: DNR   Date of Visit: 10/20/2023  : 1945  Age: 78 y.o.  Sex: female  Race: White  Bed: K472/K472 A:   MRN: [id number]  Was the patient discharged from an ICU this admission? No   Was the patient discharged from a PACU within last 24 hours? No   Did the patient receive conscious sedation/general anesthesia in last 24 hours? No   Was the patient in the ED within the past 24 hours? No   Was the patient on NIPPV within the past 24 hours? No   Attending Physician: Domi Pretty MD  Primary Service: Hospitalist   Time spent at the bedside: < 15 min    SITUATION    Notified by previous RRN during handoff  Reason for alert: prior intolerance with HD    Diagnosis: Acute on chronic respiratory failure with hypoxia   has a past medical history of Acute congestive heart failure, Anemia, Bilateral renal cysts, Cataract, Chronic LBP, Chronic pain, CKD (chronic kidney disease), stage IV, Colon polyp, COPD (chronic obstructive pulmonary disease), Dehydration, Encounter for blood transfusion, HTN (hypertension), Lumbar spondylosis, Melanoma, Metabolic bone disease, Migraines, neuralgic, Osteoporosis, Primary osteoarthritis of both knees, Pulmonary embolism with infarction, Seizures, Subdeltoid bursitis, L>R., Ulcer, and Vitamin D deficiency disease.    Last Vitals:  Temp: 97.6 °F (36.4 °C) (10/20 1051)  Pulse: 110 (10/20 1156)  Resp: 22 (10/20 1051)  BP: 109/60 (10/20 1130)  SpO2: 97 % (10/20 0803)    24 Hour Vitals Range:  Temp:  [96.1 °F (35.6 °C)-98.5 °F (36.9 °C)]   Pulse:  []   Resp:  [16-22]   BP: (100-132)/(57-78)   SpO2:  [95 %-98 %]     Clinical Issues: Circulatory    ASSESSMENT/INTERVENTIONS    Pt in HD room. State she is feeling ok. She is awake and alert. Spoke with HD nurse, Shabnam, whom states she is tolerating HD at this time. Daughter, Chintan, sitting outside of HD room. After  receiving permission from patient, update given to Chintan and discussed the role of a proactive rounding nurse.    RECOMMENDATIONS  Cont to monitor V/S and mentation.    Discussed plan of care with  HD nurseShabnam.    PROVIDER ESCALATION    Physician escalation: No    Orders received and case discussed with NA.    Disposition:Remain in room 472 after HD    FOLLOW UP    Call back the Rapid Response Nurse, Dayanara Padilla at [phone] for additional questions or concerns.

## 2023-10-20 NOTE — PROGRESS NOTES
10/20/23 1250   Vital Signs   Pulse (!) 113   Heart Rate Source Monitor   Resp (!) 22   Flow (L/min) 2   Device (Oxygen Therapy) nasal cannula   BP (!) 170/65   BP Location Left leg   BP Method Automatic   Patient Position Lying   During Hemodialysis Assessment   Intake (mL) 450 mL   Transducer Dry Yes   Access Visible Yes    notified of access issue? N/A   Heparin given? N/A   Intra-Hemodialysis Comments patient became unresponsive;rigid with coarse tremors; relieved by blood returned and NS bolus. MET called. MET team and Dr. Da Silva to BS to speak with patient and family. Patient awake. Transported back to room with team and family.     Patient became unresponsive after episode of hypotension. @ 12:45.

## 2023-10-20 NOTE — PROGRESS NOTES
"Palliative Care Daily Progress Note     S: Medical record reviewed, followed up with family and nephrology regarding patient's condition. Pt underwent a final attempt at HD today without benzos which was aborted after 90 minutes due to recurrent syncope vs seizure with LOC and barely palpable pulses which resolved with HD flushback, comparable to the event on 10/18 which led to yesterday's fractious family meeting extensively documented by writer.    As per yesterday's plan with nephrology pt is no longer a candidate for HD and she has been compassionately administered Ativan for her panic attack upon waking after the above event. She is sleeping comfortably in her hospital bed with her daughter and spouse at bedside. Both acknowledge pt is at end of life and appreciate all exhaustive efforts over the past 4+ years to enable life prolonging HD and good quality of life. Her  states "I don't know what I'll do without her" and notes they have been  for 60 years. CM and writer expressed gratitude to all family members present for their tireless work in caring for pt.    Family states they are reviewing hospice agency choice with input from a family friend and the plan is tentatively for them to meet with  a home hospice company tomorrow. They unequivocally decline hospice house placement and will only accept home dispo. They are aware that if pt cannot be safely transferred she can transition to comfort-only care at this facility. Optimistically pt will recover alertness tomorrow and the family will feel much more secure bringing her home. TBD.    B: Code Status: DNR   Advanced Directives:   Has [facility] requesting attempts at HD; extensively honored; now no longer clinically appropriate  Family/Support: Pre-grieving but productively participating in care planning   Physician's Plan of Care Goal is proceed with home hospice discharge this weekend.   Labs: noncontributory   Diagnostics: " noncontributory    Physical Exam  Constitutional:       General: She is sleeping. She is not in acute distress.     Appearance: She is cachectic. She is ill-appearing. She is not toxic-appearing or diaphoretic.      Interventions: Nasal cannula in place.   HENT:      Head: Normocephalic and atraumatic.      Comments: Temporal wasting     Mouth/Throat:      Mouth: Mucous membranes are dry.   Cardiovascular:      Rate and Rhythm: Normal rate and regular rhythm.      Pulses: Normal pulses.   Pulmonary:      Effort: Pulmonary effort is normal. No accessory muscle usage, respiratory distress or retractions.      Breath sounds: Examination of the right-lower field reveals decreased breath sounds. Examination of the left-lower field reveals decreased breath sounds. Decreased breath sounds present.   Abdominal:      General: Abdomen is flat. There is no distension.      Palpations: Abdomen is soft.      Tenderness: There is no abdominal tenderness.   Musculoskeletal:         General: No swelling. Normal range of motion.   Skin:     General: Skin is warm.      Coloration: Skin is pale.       A: Patient's current condition poor.   Patient Symptom Assessment Flowsheet has been completed.   Family is present, supportive and involved.  Discharge Planning: home with hospice [facility] over the weekend, family processing very difficult news today    R: Support offered at this time.   Palliative Care Team will continue to follow patient.     Nicolas Ramirez MD  Hospice and Palliative Medicine  Palliative Care Pager: [phone]    Total time spent: 75 minutes  > 50% of 56 minute visit spent in chart review, face to face discussion of symptoms assessment, coordination of care with other specialties, and d/c planning.    19 min ACP time spent: goals of care, emotional support, formulating and communicating prognosis and exploring burden/benefit of various approaches of treatment.      Advance Care Planning     Date:  10/20/2023    [facility]  I engaged the family in a voluntary conversation about advance care planning and we specifically addressed what the goals of care would be moving forward, in light of the patient's change in clinical status, specifically HD intolerance.  We did specifically address the patient's likely prognosis, which is  terminal on the order of one week .  We explored the patient's values and preferences for future care.  The family endorses that what is most important right now is to focus on spending time at home, avoiding the hospital, remaining as independent as possible, symptom/pain control, and quality of life, even if it means sacrificing a little time    Accordingly, we have decided that the best plan to meet the patient's goals includes enrolling in hospice care    I did explain the role for hospice care at this stage of the patient's illness, including its ability to help the patient live with the best quality of life possible.  We will be making a hospice referral.    I spent a total of 19 minutes engaging the patient in this advance care planning discussion.

## 2023-10-20 NOTE — ASSESSMENT & PLAN NOTE
Patient with Hypoxic Respiratory failure which is Acute on chronic.  she is not on home oxygen. Supplemental oxygen was provided and noted- Oxygen Concentration (%):  [25-40] 25    .   Signs/symptoms of respiratory failure include- respiratory distress. Contributing diagnoses includes - penumonia.  Labs and images were reviewed. Patient Has recent ABG, which has been reviewed. Will treat underlying causes and adjust management of respiratory failure as follows    Multiple admissions for PNA and SOB   CXR Grossly stable appearing cardiopulmonary findings noting pleural effusions and bilateral basilar subsegmental atelectasis.  Developing left lower lung zone consolidation not excluded.    Started on BiPAP and weaned to 1 L NC  Procal 1.44, elevated from prior  Stop vancomycin  Pulm and Nephro feel this is more due to volume overload due to issues receiving dialysis and fluid removal, L lung findings are less likely recurrent pneumonia  Multiple thoracenteses in the past   ceftriaxone and azithromycin was stopped  Monitor without iv abx  Resp cx, BCx NGTD  Leukocytosis resolved

## 2023-10-20 NOTE — PROGRESS NOTES
Case discussed with CM Supervisor Darrian    Referral sent to [facility] - [place] per [facility] --   On call person paged per answering service - [phone].       Will ask rep to contact pt's daughter in am re: home hospice:  Chintan Stack Daughter  [phone]  ---------------------------------   spoke with Suki - p  [phone]  with [facility] - daughter will be contacted by Ashu tomorrow  -- [phone] - this is the admissions line and the best # to reach Mr. Wakefield.

## 2023-10-20 NOTE — ASSESSMENT & PLAN NOTE
S/p 5 days of treatment  Pneumonia ruled out  Leukocytosis most likely from steroids which was also stopped

## 2023-10-20 NOTE — PLAN OF CARE
METS called for patient not responding during dialysis.   The initial blood pressure was unable to obtain.  Patient was rinsed back with readable blood pressure and improvement in mental status, but noted to have increased work of breathing and anxious.  /65 mmHg,  bpm, RR 22 bpm. Blood glucose 110.  Patient was given one time dose of ativan for anxiety with improvement in anxiety.  Patient was transferred back to her room.  Family updated at bedside.  GOC conversation on going.    Patient was seen with the attending physician Dr. Geovanni Duran.     Anthony Verdugo MD  [employer] PCCM Fellow, [facility]

## 2023-10-21 PROBLEM — R54 FRAILTY SYNDROME IN GERIATRIC PATIENT: Status: ACTIVE | Noted: 2023-10-21

## 2023-10-21 PROCEDURE — 25000242 PHARM REV CODE 250 ALT 637 W/ HCPCS: Mod: HCNC | Performed by: FAMILY MEDICINE

## 2023-10-21 PROCEDURE — 63600175 PHARM REV CODE 636 W HCPCS: Mod: HCNC | Performed by: FAMILY MEDICINE

## 2023-10-21 PROCEDURE — 25000003 PHARM REV CODE 250: Mod: HCNC | Performed by: STUDENT IN AN ORGANIZED HEALTH CARE EDUCATION/TRAINING PROGRAM

## 2023-10-21 PROCEDURE — 25000003 PHARM REV CODE 250: Mod: HCNC | Performed by: FAMILY MEDICINE

## 2023-10-21 PROCEDURE — 94640 AIRWAY INHALATION TREATMENT: CPT | Mod: HCNC

## 2023-10-21 PROCEDURE — 94761 N-INVAS EAR/PLS OXIMETRY MLT: CPT | Mod: HCNC

## 2023-10-21 PROCEDURE — 27000221 HC OXYGEN, UP TO 24 HOURS: Mod: HCNC

## 2023-10-21 PROCEDURE — 99900035 HC TECH TIME PER 15 MIN (STAT): Mod: HCNC

## 2023-10-21 PROCEDURE — 11000001 HC ACUTE MED/SURG PRIVATE ROOM: Mod: HCNC

## 2023-10-21 RX ORDER — TRAMADOL HYDROCHLORIDE 50 MG/1
50 TABLET ORAL 2 TIMES DAILY PRN
Status: DISCONTINUED | OUTPATIENT
Start: 2023-10-21 | End: 2023-10-27 | Stop reason: HOSPADM

## 2023-10-21 RX ORDER — HYDROCODONE BITARTRATE AND ACETAMINOPHEN 10; 325 MG/1; MG/1
1 TABLET ORAL EVERY 6 HOURS PRN
Status: DISCONTINUED | OUTPATIENT
Start: 2023-10-21 | End: 2023-10-27 | Stop reason: HOSPADM

## 2023-10-21 RX ADMIN — ACETAMINOPHEN 325MG 650 MG: 325 TABLET ORAL at 07:10

## 2023-10-21 RX ADMIN — FUROSEMIDE 80 MG: 40 TABLET ORAL at 10:10

## 2023-10-21 RX ADMIN — MIDODRINE HYDROCHLORIDE 15 MG: 5 TABLET ORAL at 10:10

## 2023-10-21 RX ADMIN — MEGESTROL ACETATE 20 MG: 20 TABLET ORAL at 08:10

## 2023-10-21 RX ADMIN — HYDROCODONE BITARTRATE AND ACETAMINOPHEN 1 TABLET: 10; 325 TABLET ORAL at 08:10

## 2023-10-21 RX ADMIN — HEPARIN SODIUM 5000 UNITS: 5000 INJECTION INTRAVENOUS; SUBCUTANEOUS at 08:10

## 2023-10-21 RX ADMIN — IPRATROPIUM BROMIDE AND ALBUTEROL SULFATE 3 ML: 2.5; .5 SOLUTION RESPIRATORY (INHALATION) at 03:10

## 2023-10-21 RX ADMIN — ACETAMINOPHEN 325MG 650 MG: 325 TABLET ORAL at 08:10

## 2023-10-21 RX ADMIN — FLUTICASONE FUROATE AND VILANTEROL TRIFENATATE 1 PUFF: 200; 25 POWDER RESPIRATORY (INHALATION) at 10:10

## 2023-10-21 RX ADMIN — IPRATROPIUM BROMIDE AND ALBUTEROL SULFATE 3 ML: 2.5; .5 SOLUTION RESPIRATORY (INHALATION) at 10:10

## 2023-10-21 RX ADMIN — SEVELAMER CARBONATE 800 MG: 800 TABLET, FILM COATED ORAL at 10:10

## 2023-10-21 RX ADMIN — MEGESTROL ACETATE 20 MG: 20 TABLET ORAL at 10:10

## 2023-10-21 RX ADMIN — SEVELAMER CARBONATE 800 MG: 800 TABLET, FILM COATED ORAL at 05:10

## 2023-10-21 RX ADMIN — MIRTAZAPINE 7.5 MG: 7.5 TABLET ORAL at 08:10

## 2023-10-21 RX ADMIN — IPRATROPIUM BROMIDE AND ALBUTEROL SULFATE 3 ML: 2.5; .5 SOLUTION RESPIRATORY (INHALATION) at 07:10

## 2023-10-21 RX ADMIN — IPRATROPIUM BROMIDE AND ALBUTEROL SULFATE 3 ML: 2.5; .5 SOLUTION RESPIRATORY (INHALATION) at 11:10

## 2023-10-21 RX ADMIN — HEPARIN SODIUM 5000 UNITS: 5000 INJECTION INTRAVENOUS; SUBCUTANEOUS at 10:10

## 2023-10-21 RX ADMIN — IPRATROPIUM BROMIDE AND ALBUTEROL SULFATE 3 ML: 2.5; .5 SOLUTION RESPIRATORY (INHALATION) at 04:10

## 2023-10-21 RX ADMIN — MUPIROCIN: 20 OINTMENT TOPICAL at 10:10

## 2023-10-21 RX ADMIN — NEPHROCAP 1 CAPSULE: 1 CAP ORAL at 10:10

## 2023-10-21 RX ADMIN — TRAMADOL HYDROCHLORIDE 50 MG: 50 TABLET, COATED ORAL at 02:10

## 2023-10-21 RX ADMIN — MUPIROCIN: 20 OINTMENT TOPICAL at 08:10

## 2023-10-21 NOTE — PROGRESS NOTES
[facility]  Progress Note    Patient Name: Katie Quevedo  MRN: [id number]  Patient Class: IP- Inpatient   Admission Date: 10/12/2023  Length of Stay: 9 days  Attending Physician: Sonu Martínez MD  Primary Care Provider: Kingston Verduzco MD        Subjective:     Principal Problem:Acute on chronic respiratory failure with hypoxia      HPI:  Katie Quevedo is a 78 y.o.  female who  has a past medical history of Acute congestive heart failure (02/10/2020), Anemia, Bilateral renal cysts, Cataract, Chronic LBP (7/26/2012), Chronic pain, CKD (chronic kidney disease), stage IV, Colon polyp (2013), COPD (chronic obstructive pulmonary disease), Dehydration, Encounter for blood transfusion, HTN (hypertension), Lumbar spondylosis, Melanoma, Metabolic bone disease, Migraines, neuralgic, Osteoporosis, Primary osteoarthritis of both knees, Pulmonary embolism with infarction, Seizures (1972), Subdeltoid bursitis, L>R. (9/27/2012), Ulcer, and Vitamin D deficiency disease.. The patient presented to [facility] on 10/12/2023 with a primary complaint of Shortness of Breath (Pt presents to the ED for sob. Pt presents on a neb mask at 8lpm .  Pt has been hospitalized recently for pneumonia)     The patient was in their usual state of health until 2 days ago when she was discharge from [facility] for respiratory failure 2/2 pneumonia, COPD. She has had 2 other hospital admissions in the last 2 weeks. She was barely able to finish her HD session yesterday. Spoke with Dr. Montemayor, she has been struggling to complete HD sessions over the last 6 weeks and has becoming more and more frail and tachycardic on the machine and there is some concern she will not be able to continue with dialysis. She asked to come to the ER today for worsening shortness of breath. She does not have BiPAP at home. Daughter has not observed any issues with swallowing or episodes concerning for aspiration. She is on  chronic opiates, unclear if patient has been more sedated than usual at home.        Overview/Hospital Course:  Pt offf from levophed and now midodrine is increased. Dialysis per nephro. Palliative consulted and now pt is a DNR.   10/17: 1.5L removed. Complains of cramps after.   10/18: pt was unresponsive on dialysis for 15 min with hypotension. Pt now alert and oriented. Will plan for family meeting tomorrow to discuss goals of care  10/19: Long discussion with goals of care. Pt and family still wants aggressive care. Would like to try dialysis without any benzos or opioids on board which could precipitate the hypotension leading to the unresponsive episode. U/a and cxr repeated to rule out infection per family friend's request  10/20: WBCS trended down to normal. U/a pending. CXR unchanged. Pt underwent HD today without benzos. After 90 minutes of HD, pt became unresponsive with barely palpable pulses ,not able to record BP, dialysis stopped.Rinsed back with BP improved and patient regained  consciousness. Unfortunately patient is not tolerating dialysis and will no longer be able to receive dialysis. To focus on comfort. Ancticipate D/c in am with hospice    Interval History: no acute event overnight. Reviewed labs and vitals. Daughter refusing hospice. Awaiting placement.    Review of Systems   Constitutional:  Positive for fatigue. Negative for activity change, diaphoresis and unexpected weight change.   HENT: Negative.  Negative for congestion, ear discharge, hearing loss, rhinorrhea, sore throat and voice change.    Eyes: Negative.  Negative for pain, discharge and visual disturbance.   Respiratory: Negative.  Negative for chest tightness, shortness of breath and wheezing.    Cardiovascular: Negative.  Negative for chest pain.   Gastrointestinal: Negative.  Negative for abdominal distention, anal bleeding, constipation and nausea.   Endocrine: Negative.  Negative for cold intolerance, polydipsia and  polyuria.   Genitourinary: Negative.  Negative for decreased urine volume, difficulty urinating, dysuria, frequency, menstrual problem and vaginal pain.   Musculoskeletal: Negative.  Negative for arthralgias, gait problem and myalgias.   Skin: Negative.  Negative for color change, pallor and wound.   Allergic/Immunologic: Negative.  Negative for environmental allergies and immunocompromised state.   Neurological:  Positive for weakness. Negative for dizziness, tremors, seizures, speech difficulty and headaches.   Hematological: Negative.  Negative for adenopathy. Does not bruise/bleed easily.   Psychiatric/Behavioral: Negative.  Negative for agitation, confusion, decreased concentration, hallucinations, self-injury and suicidal ideas. The patient is not nervous/anxious.      Objective:     Vital Signs (Most Recent):  Temp: 97.9 °F (36.6 °C) (10/21/23 0749)  Pulse: 104 (10/21/23 0749)  Resp: 19 (10/21/23 0749)  BP: (!) 106/54 (10/21/23 0749)  SpO2: 95 % (10/21/23 0824) Vital Signs (24h Range):  Temp:  [97.6 °F (36.4 °C)-99.3 °F (37.4 °C)] 97.9 °F (36.6 °C)  Pulse:  [101-148] 104  Resp:  [17-38] 19  SpO2:  [92 %-99 %] 95 %  BP: (100-170)/(51-69) 106/54     Weight: 40.5 kg (89 lb 5.1 oz)  Body mass index is 16.34 kg/m².  No intake or output data in the 24 hours ending 10/21/23 0835   Physical Exam      General: She is not in acute distress.     Appearance: She is ill-appearing.   HENT:      Head: Normocephalic and atraumatic.   Cardiovascular:      Rate and Rhythm: Regular rhythm. Tachycardia present.      Heart sounds: No murmur heard.  Pulmonary:      Breath sounds: Examination of the left-lower field reveals decreased breath sounds. Decreased breath sounds present. No rales.   Abdominal:      General: Abdomen is flat. Bowel sounds are normal.      Palpations: Abdomen is soft.   Musculoskeletal:      Right lower leg: No edema.      Left lower leg: No edema.   Neurological:      General: No focal deficit present.       Motor: Weakness present.     Significant Labs: All pertinent labs within the past 24 hours have been reviewed.  CBC:   Recent Labs   Lab 10/19/23  1329 10/20/23  0434   WBC 15.46* 10.19   HGB 12.1 10.5*   HCT 36.7* 31.9*   * 123*     CMP:   Recent Labs   Lab 10/19/23  1329 10/20/23  0434   * 129*   K 4.8 4.8   CL 89* 91*   CO2 20* 16*    86   BUN 81* 96*   CREATININE 8.1* 9.0*   CALCIUM 9.2 8.6*   PROT 8.0 6.7   ALBUMIN 4.1 3.5   BILITOT 0.6 0.5   ALKPHOS 65 60   AST 24 19   ALT 50* 40   ANIONGAP 22* 22*       Significant Imaging: I have reviewed all pertinent imaging results/findings within the past 24 hours.  I have reviewed and interpreted all pertinent imaging results/findings within the past 24 hours.    Assessment/Plan:      Active Diagnoses:    Diagnosis Date Noted POA    PRINCIPAL PROBLEM:  Acute on chronic respiratory failure with hypoxia [J96.21] 03/05/2018 Yes    Frailty syndrome in geriatric patient [R54] 10/21/2023 Unknown    Hyperkalemia [E87.5] 09/30/2023 Yes    Community acquired pneumonia of left lower lobe of lung [J18.9] 09/29/2023 Yes    Goals of care, counseling/discussion [Z71.89] 09/29/2023 Not Applicable    Chronic obstructive pulmonary disease with acute exacerbation [J44.1]  Yes    ESRD (end stage renal disease) on dialysis [N18.6, Z99.2] 02/19/2020 Not Applicable     Chronic    Chronic respiratory failure with hypoxia, on home O2 therapy [J96.11, Z99.81] 07/18/2019 Not Applicable     Chronic    Shortness of breath [R06.02] 07/17/2019 Yes      Problems Resolved During this Admission:     VTE Risk Mitigation (From admission, onward)           Ordered     heparin (porcine) injection 5,000 Units  Every 12 hours         10/12/23 1406     IP VTE HIGH RISK PATIENT  Once         10/12/23 1406     Place sequential compression device  Until discontinued         10/12/23 1406     Place sequential compression device  Until discontinued         10/12/23 1406     heparin (porcine)  injection 4,100 Units  As needed (PRN)         10/12/23 1406                   * Acute on chronic respiratory failure with hypoxia  Patient with Hypoxic Respiratory failure which is Acute on chronic.  she is not on home oxygen. Supplemental oxygen was provided and noted- Oxygen Concentration (%):  [25-40] 25     .   Signs/symptoms of respiratory failure include- respiratory distress. Contributing diagnoses includes - penumonia.  Labs and images were reviewed. Patient Has recent ABG, which has been reviewed. Will treat underlying causes and adjust management of respiratory failure as follows     Multiple admissions for PNA and SOB   CXR Grossly stable appearing cardiopulmonary findings noting pleural effusions and bilateral basilar subsegmental atelectasis.  Developing left lower lung zone consolidation not excluded.    Started on BiPAP and weaned to 1 L NC  Procal 1.44, elevated from prior  Stop vancomycin  Pulm and Nephro feel this is more due to volume overload due to issues receiving dialysis and fluid removal, L lung findings are less likely recurrent pneumonia  Multiple thoracenteses in the past   ceftriaxone and azithromycin was stopped  Monitor without iv abx  Resp cx, BCx NGTD  Leukocytosis resolved        Goals of care, counseling/discussion  Advance Care Planning      Code Status  In light of the patients advanced and life limiting illness,I engaged the the patient in a voluntary conversation about the patient's preferences for care  at the very end of life. The patient wishes to have a natural, peaceful death.  Along those lines, the patient does not wish to have CPR or other invasive treatments performed when her heart and/or breathing stops. I communicated to the patient that a DNR form was completed and will be scanned into EPIC.       Pt not tolerating dialysis  Palliative on board  Anticipate hospice in am        Family discussion to be held tomorrow      Community acquired pneumonia of left lower  lobe of lung  S/p 5 days of treatment  Pneumonia ruled out  Leukocytosis most likely from steroids which was also stopped        ESRD (end stage renal disease) on dialysis   Nephrology on board  she has been struggling to complete HD sessions over the last 6 weeks and has becoming more and more frail and tachycardic on the machine and there is some concern she will not be able to continue with dialysis  Attempted dialysis again without any benzos to which pt became unresponsive  Palliative on board  Anticipate d/c once ready    Sonu Martínez MD  Department of Hospital Medicine   [facility]

## 2023-10-21 NOTE — PT/OT/SLP PROGRESS
Occupational Therapy      Patient Name:  Katie Quevedo   MRN:  [id number]    1100 - Patient not seen today secondary to doctor in room conferring with pt and family at time of OT visit (OT unable to return). Will follow-up at next scheduled visit.    Bridget Forbes, OT  10/21/2023

## 2023-10-21 NOTE — PLAN OF CARE
Problem: Adult Inpatient Plan of Care  Goal: Plan of Care Review  10/21/2023 1701 by Agustin Zamora RN  Outcome: Ongoing, Not Progressing  VN note:   Patient's chart, labs and vital signs reviewed, will be available to intervene if needed.

## 2023-10-21 NOTE — PROGRESS NOTES
10/19 after detailed and extensive discussion  goals of care with Patient and  family : , children and family friend  physician due to  10/18 episode of AMS/LOC on dialysis MET called. There is a  high concern that she is not able to tolerated Hemodialysis but family and patient is not ready to Proceed with Hospice and were asking to eliminate all possible caused of her LOC on HD like medication/infection so all potential meds were stopped. Patient and Family requested  to try HD one more time I have discussed with them   safe parameters  where we would not be able to continue dialysis  if  SBP <90, or if she  become   Confused/unresponsive on Dialysis  then   dialysis will be not contained.   Hospice and comfort care would be the way to focus.  Yesterday  10/20  she had another episode of LOC Hypotension no pulse 1hr23 min on HD with UF 400cc. Hospice care was discussed with family and plan was to proceed. Later PM family requested second opinion from [facility].     Will remove  myself from case per family and patient request   Consult [facility].

## 2023-10-22 LAB
ALBUMIN SERPL BCP-MCNC: 3.3 G/DL (ref 3.5–5.2)
ALP SERPL-CCNC: 64 U/L (ref 55–135)
ALT SERPL W/O P-5'-P-CCNC: 27 U/L (ref 10–44)
ANION GAP SERPL CALC-SCNC: 22 MMOL/L (ref 8–16)
AST SERPL-CCNC: 16 U/L (ref 10–40)
BASOPHILS # BLD AUTO: 0.02 K/UL (ref 0–0.2)
BASOPHILS NFR BLD: 0.2 % (ref 0–1.9)
BILIRUB SERPL-MCNC: 0.5 MG/DL (ref 0.1–1)
BUN SERPL-MCNC: 73 MG/DL (ref 8–23)
CALCIUM SERPL-MCNC: 8.9 MG/DL (ref 8.7–10.5)
CHLORIDE SERPL-SCNC: 90 MMOL/L (ref 95–110)
CO2 SERPL-SCNC: 22 MMOL/L (ref 23–29)
CREAT SERPL-MCNC: 8.7 MG/DL (ref 0.5–1.4)
DIFFERENTIAL METHOD: ABNORMAL
EOSINOPHIL # BLD AUTO: 0.7 K/UL (ref 0–0.5)
EOSINOPHIL NFR BLD: 8.3 % (ref 0–8)
ERYTHROCYTE [DISTWIDTH] IN BLOOD BY AUTOMATED COUNT: 18.1 % (ref 11.5–14.5)
EST. GFR  (NO RACE VARIABLE): 4 ML/MIN/1.73 M^2
GLUCOSE SERPL-MCNC: 122 MG/DL (ref 70–110)
HCT VFR BLD AUTO: 31 % (ref 37–48.5)
HGB BLD-MCNC: 10.1 G/DL (ref 12–16)
IMM GRANULOCYTES # BLD AUTO: 0.09 K/UL (ref 0–0.04)
IMM GRANULOCYTES NFR BLD AUTO: 1 % (ref 0–0.5)
LYMPHOCYTES # BLD AUTO: 0.7 K/UL (ref 1–4.8)
LYMPHOCYTES NFR BLD: 7.8 % (ref 18–48)
MAGNESIUM SERPL-MCNC: 2.3 MG/DL (ref 1.6–2.6)
MCH RBC QN AUTO: 33.1 PG (ref 27–31)
MCHC RBC AUTO-ENTMCNC: 32.6 G/DL (ref 32–36)
MCV RBC AUTO: 102 FL (ref 82–98)
MONOCYTES # BLD AUTO: 0.7 K/UL (ref 0.3–1)
MONOCYTES NFR BLD: 8.2 % (ref 4–15)
NEUTROPHILS # BLD AUTO: 6.5 K/UL (ref 1.8–7.7)
NEUTROPHILS NFR BLD: 74.5 % (ref 38–73)
NRBC BLD-RTO: 0 /100 WBC
PHOSPHATE SERPL-MCNC: 5.3 MG/DL (ref 2.7–4.5)
PLATELET # BLD AUTO: 157 K/UL (ref 150–450)
PMV BLD AUTO: 10.3 FL (ref 9.2–12.9)
POTASSIUM SERPL-SCNC: 4.2 MMOL/L (ref 3.5–5.1)
PROT SERPL-MCNC: 7.1 G/DL (ref 6–8.4)
PTH-INTACT SERPL-MCNC: 660.7 PG/ML (ref 9–77)
RBC # BLD AUTO: 3.05 M/UL (ref 4–5.4)
SODIUM SERPL-SCNC: 134 MMOL/L (ref 136–145)
WBC # BLD AUTO: 8.69 K/UL (ref 3.9–12.7)

## 2023-10-22 PROCEDURE — 94640 AIRWAY INHALATION TREATMENT: CPT | Mod: HCNC

## 2023-10-22 PROCEDURE — 36415 COLL VENOUS BLD VENIPUNCTURE: CPT | Mod: HCNC | Performed by: FAMILY MEDICINE

## 2023-10-22 PROCEDURE — 25000003 PHARM REV CODE 250: Mod: HCNC | Performed by: STUDENT IN AN ORGANIZED HEALTH CARE EDUCATION/TRAINING PROGRAM

## 2023-10-22 PROCEDURE — 94761 N-INVAS EAR/PLS OXIMETRY MLT: CPT | Mod: HCNC

## 2023-10-22 PROCEDURE — 25000242 PHARM REV CODE 250 ALT 637 W/ HCPCS: Mod: HCNC | Performed by: FAMILY MEDICINE

## 2023-10-22 PROCEDURE — 83970 ASSAY OF PARATHORMONE: CPT | Mod: HCNC | Performed by: FAMILY MEDICINE

## 2023-10-22 PROCEDURE — 83735 ASSAY OF MAGNESIUM: CPT | Mod: HCNC | Performed by: FAMILY MEDICINE

## 2023-10-22 PROCEDURE — 25000003 PHARM REV CODE 250: Mod: HCNC | Performed by: FAMILY MEDICINE

## 2023-10-22 PROCEDURE — 94660 CPAP INITIATION&MGMT: CPT | Mod: HCNC

## 2023-10-22 PROCEDURE — 63600175 PHARM REV CODE 636 W HCPCS: Mod: HCNC | Performed by: FAMILY MEDICINE

## 2023-10-22 PROCEDURE — 99900035 HC TECH TIME PER 15 MIN (STAT): Mod: HCNC

## 2023-10-22 PROCEDURE — 80053 COMPREHEN METABOLIC PANEL: CPT | Mod: HCNC | Performed by: FAMILY MEDICINE

## 2023-10-22 PROCEDURE — 27100171 HC OXYGEN HIGH FLOW UP TO 24 HOURS: Mod: HCNC

## 2023-10-22 PROCEDURE — 27000221 HC OXYGEN, UP TO 24 HOURS: Mod: HCNC

## 2023-10-22 PROCEDURE — 97110 THERAPEUTIC EXERCISES: CPT | Mod: HCNC

## 2023-10-22 PROCEDURE — 85025 COMPLETE CBC W/AUTO DIFF WBC: CPT | Mod: HCNC | Performed by: FAMILY MEDICINE

## 2023-10-22 PROCEDURE — 84100 ASSAY OF PHOSPHORUS: CPT | Mod: HCNC | Performed by: FAMILY MEDICINE

## 2023-10-22 PROCEDURE — 11000001 HC ACUTE MED/SURG PRIVATE ROOM: Mod: HCNC

## 2023-10-22 RX ORDER — BISACODYL 5 MG
5 TABLET, DELAYED RELEASE (ENTERIC COATED) ORAL DAILY PRN
Status: DISCONTINUED | OUTPATIENT
Start: 2023-10-22 | End: 2023-10-27 | Stop reason: HOSPADM

## 2023-10-22 RX ADMIN — SEVELAMER CARBONATE 800 MG: 800 TABLET, FILM COATED ORAL at 09:10

## 2023-10-22 RX ADMIN — IPRATROPIUM BROMIDE AND ALBUTEROL SULFATE 3 ML: 2.5; .5 SOLUTION RESPIRATORY (INHALATION) at 03:10

## 2023-10-22 RX ADMIN — MIRTAZAPINE 7.5 MG: 7.5 TABLET ORAL at 09:10

## 2023-10-22 RX ADMIN — ACETAMINOPHEN 325MG 650 MG: 325 TABLET ORAL at 12:10

## 2023-10-22 RX ADMIN — IPRATROPIUM BROMIDE AND ALBUTEROL SULFATE 3 ML: 2.5; .5 SOLUTION RESPIRATORY (INHALATION) at 11:10

## 2023-10-22 RX ADMIN — MEGESTROL ACETATE 20 MG: 20 TABLET ORAL at 09:10

## 2023-10-22 RX ADMIN — IPRATROPIUM BROMIDE AND ALBUTEROL SULFATE 3 ML: 2.5; .5 SOLUTION RESPIRATORY (INHALATION) at 07:10

## 2023-10-22 RX ADMIN — HYDROCODONE BITARTRATE AND ACETAMINOPHEN 1 TABLET: 10; 325 TABLET ORAL at 07:10

## 2023-10-22 RX ADMIN — HEPARIN SODIUM 5000 UNITS: 5000 INJECTION INTRAVENOUS; SUBCUTANEOUS at 09:10

## 2023-10-22 RX ADMIN — BISACODYL 5 MG: 5 TABLET, COATED ORAL at 05:10

## 2023-10-22 RX ADMIN — HYDROCODONE BITARTRATE AND ACETAMINOPHEN 1 TABLET: 10; 325 TABLET ORAL at 03:10

## 2023-10-22 RX ADMIN — ACETAMINOPHEN 325MG 650 MG: 325 TABLET ORAL at 05:10

## 2023-10-22 RX ADMIN — SEVELAMER CARBONATE 800 MG: 800 TABLET, FILM COATED ORAL at 12:10

## 2023-10-22 RX ADMIN — FLUTICASONE FUROATE AND VILANTEROL TRIFENATATE 1 PUFF: 200; 25 POWDER RESPIRATORY (INHALATION) at 08:10

## 2023-10-22 RX ADMIN — MIDODRINE HYDROCHLORIDE 15 MG: 5 TABLET ORAL at 09:10

## 2023-10-22 RX ADMIN — Medication 6 MG: at 09:10

## 2023-10-22 RX ADMIN — HYDROCODONE BITARTRATE AND ACETAMINOPHEN 1 TABLET: 10; 325 TABLET ORAL at 12:10

## 2023-10-22 RX ADMIN — SEVELAMER CARBONATE 800 MG: 800 TABLET, FILM COATED ORAL at 05:10

## 2023-10-22 RX ADMIN — NEPHROCAP 1 CAPSULE: 1 CAP ORAL at 09:10

## 2023-10-22 NOTE — NURSING
Rapid Response Nurse Follow-up Note     Followed up with patient for proactive rounding.   Chart reviewed due to MEWS score of 5. VSS, mild sinus tach 116. No acute concerns.  Team will continue to follow.  Please call Rapid Response RN, ELAINE LONGO RN with any questions or concerns at [facility] Phone: [phone]

## 2023-10-22 NOTE — PROGRESS NOTES
[facility] Medicine  Progress Note    Patient Name: Katie Quevedo  MRN: [id number]  Patient Class: IP- Inpatient   Admission Date: 10/12/2023  Length of Stay: 10 days  Attending Physician: Sonu Martínez MD  Primary Care Provider: Kingston Verduzco MD        Subjective:     Principal Problem:Acute on chronic respiratory failure with hypoxia                 [facility]  Progress Note     Patient Name: Katie Quevedo  MRN: [id number]  Patient Class: IP- Inpatient     Admission Date: 10/12/2023  Length of Stay: 9 days  Attending Physician: Sonu Martínez MD  Primary Care Provider: Kingston Verduzco MD           Subjective:      Principal Problem:Acute on chronic respiratory failure with hypoxia        HPI:  Katie Quevedo is a 78 y.o.  female who  has a past medical history of Acute congestive heart failure (02/10/2020), Anemia, Bilateral renal cysts, Cataract, Chronic LBP (7/26/2012), Chronic pain, CKD (chronic kidney disease), stage IV, Colon polyp (2013), COPD (chronic obstructive pulmonary disease), Dehydration, Encounter for blood transfusion, HTN (hypertension), Lumbar spondylosis, Melanoma, Metabolic bone disease, Migraines, neuralgic, Osteoporosis, Primary osteoarthritis of both knees, Pulmonary embolism with infarction, Seizures (1972), Subdeltoid bursitis, L>R. (9/27/2012), Ulcer, and Vitamin D deficiency disease.. The patient presented to [facility] on 10/12/2023 with a primary complaint of Shortness of Breath (Pt presents to the ED for sob. Pt presents on a neb mask at 8lpm .  Pt has been hospitalized recently for pneumonia)     The patient was in their usual state of health until 2 days ago when she was discharge from [facility] for respiratory failure 2/2 pneumonia, COPD. She has had 2 other hospital admissions in the last 2 weeks. She was barely able to finish her HD session yesterday. Spoke with Dr. Montemayor, she has been struggling to  complete HD sessions over the last 6 weeks and has becoming more and more frail and tachycardic on the machine and there is some concern she will not be able to continue with dialysis. She asked to come to the ER today for worsening shortness of breath. She does not have BiPAP at home. Daughter has not observed any issues with swallowing or episodes concerning for aspiration. She is on chronic opiates, unclear if patient has been more sedated than usual at home.        Overview/Hospital Course:  Pt offf from levophed and now midodrine is increased. Dialysis per nephro. Palliative consulted and now pt is a DNR.   10/17: 1.5L removed. Complains of cramps after.   10/18: pt was unresponsive on dialysis for 15 min with hypotension. Pt now alert and oriented. Will plan for family meeting tomorrow to discuss goals of care  10/19: Long discussion with goals of care. Pt and family still wants aggressive care. Would like to try dialysis without any benzos or opioids on board which could precipitate the hypotension leading to the unresponsive episode. U/a and cxr repeated to rule out infection per family friend's request  10/20: WBCS trended down to normal. U/a pending. CXR unchanged. Pt underwent HD today without benzos. After 90 minutes of HD, pt became unresponsive with barely palpable pulses ,not able to record BP, dialysis stopped.Rinsed back with BP improved and patient regained  consciousness. Unfortunately patient is not tolerating dialysis and will no longer be able to receive dialysis. To focus on comfort.            Interval History: no acute event overnight. Labs pending. Awaiting new nephrology consult from [facility] per family request.    Review of Systems    Constitutional:  Positive for fatigue. Negative for activity change, diaphoresis and unexpected weight change.   HENT: Negative.  Negative for congestion, ear discharge, hearing loss, rhinorrhea, sore throat and voice change.    Eyes: Negative.  Negative for  pain, discharge and visual disturbance.   Respiratory: Negative.  Negative for chest tightness, shortness of breath and wheezing.    Cardiovascular: Negative.  Negative for chest pain.   Gastrointestinal: Negative.  Negative for abdominal distention, anal bleeding, constipation and nausea.   Endocrine: Negative.  Negative for cold intolerance, polydipsia and polyuria.   Genitourinary: Negative.  Negative for decreased urine volume, difficulty urinating, dysuria, frequency, menstrual problem and vaginal pain.   Musculoskeletal: Negative.  Negative for arthralgias, gait problem and myalgias.   Skin: Negative.  Negative for color change, pallor and wound.   Allergic/Immunologic: Negative.  Negative for environmental allergies and immunocompromised state.   Neurological:  Positive for weakness. Negative for dizziness, tremors, seizures, speech difficulty and headaches.   Hematological: Negative.  Negative for adenopathy. Does not bruise/bleed easily.   Psychiatric/Behavioral: Negative.  Negative for agitation, confusion, decreased concentration, hallucinations, self-injury and suicidal ideas. The patient is not nervous/anxious.       Objective:     Vital Signs (Most Recent):  Temp: 96.9 °F (36.1 °C) (10/22/23 0752)  Pulse: 107 (10/22/23 0804)  Resp: 16 (10/22/23 0804)  BP: (!) 116/53 (10/22/23 0752)  SpO2: 98 % (10/22/23 0804) Vital Signs (24h Range):  Temp:  [96.2 °F (35.7 °C)-98.7 °F (37.1 °C)] 96.9 °F (36.1 °C)  Pulse:  [102-197] 107  Resp:  [16-22] 16  SpO2:  [93 %-98 %] 98 %  BP: ()/(53-99) 116/53     Weight: 40.5 kg (89 lb 5.1 oz)  Body mass index is 16.34 kg/m².    Intake/Output Summary (Last 24 hours) at 10/22/2023 1010  Last data filed at 10/22/2023 0512  Gross per 24 hour   Intake 625 ml   Output 0 ml   Net 625 ml      Physical Exam      General: She is not in acute distress.     Appearance: She is ill-appearing.   HENT:      Head: Normocephalic and atraumatic.   Cardiovascular:      Rate and Rhythm:  Regular rhythm. Tachycardia present.      Heart sounds: No murmur heard.  Pulmonary:      Breath sounds: Examination of the left-lower field reveals decreased breath sounds. Decreased breath sounds present. No rales.   Abdominal:      General: Abdomen is flat. Bowel sounds are normal.      Palpations: Abdomen is soft.   Musculoskeletal:      Right lower leg: No edema.      Left lower leg: No edema.   Neurological:      General: No focal deficit present.      Motor: Weakness present.     Significant Labs:   Lab Results   Component Value Date    WBC 10.19 10/20/2023    HGB 10.5 (L) 10/20/2023    HCT 31.9 (L) 10/20/2023     (H) 10/20/2023     (L) 10/20/2023       CMP  Sodium   Date Value Ref Range Status   10/20/2023 129 (L) 136 - 145 mmol/L Final     Potassium   Date Value Ref Range Status   10/20/2023 4.8 3.5 - 5.1 mmol/L Final     Chloride   Date Value Ref Range Status   10/20/2023 91 (L) 95 - 110 mmol/L Final     CO2   Date Value Ref Range Status   10/20/2023 16 (L) 23 - 29 mmol/L Final     Glucose   Date Value Ref Range Status   10/20/2023 86 70 - 110 mg/dL Final     BUN   Date Value Ref Range Status   10/20/2023 96 (H) 8 - 23 mg/dL Final     Creatinine   Date Value Ref Range Status   10/20/2023 9.0 (H) 0.5 - 1.4 mg/dL Final     Calcium   Date Value Ref Range Status   10/20/2023 8.6 (L) 8.7 - 10.5 mg/dL Final     Total Protein   Date Value Ref Range Status   10/20/2023 6.7 6.0 - 8.4 g/dL Final     Albumin   Date Value Ref Range Status   10/20/2023 3.5 3.5 - 5.2 g/dL Final     Total Bilirubin   Date Value Ref Range Status   10/20/2023 0.5 0.1 - 1.0 mg/dL Final     Comment:     For infants and newborns, interpretation of results should be based  on gestational age, weight and in agreement with clinical  observations.    Premature Infant recommended reference ranges:  Up to 24 hours.............<8.0 mg/dL  Up to 48 hours............<12.0 mg/dL  3-5 days..................<15.0 mg/dL  6-29  days.................<15.0 mg/dL       Alkaline Phosphatase   Date Value Ref Range Status   10/20/2023 60 55 - 135 U/L Final     AST   Date Value Ref Range Status   10/20/2023 19 10 - 40 U/L Final     ALT   Date Value Ref Range Status   10/20/2023 40 10 - 44 U/L Final     Anion Gap   Date Value Ref Range Status   10/20/2023 22 (H) 8 - 16 mmol/L Final     eGFR   Date Value Ref Range Status   10/20/2023 4 (A) >60 mL/min/1.73 m^2 Final         Significant Imaging: I have reviewed all pertinent imaging results/findings within the past 24 hours.  I have reviewed and interpreted all pertinent imaging results/findings within the past 24 hours.    Assessment/Plan:      Active Diagnoses:    Diagnosis Date Noted POA    PRINCIPAL PROBLEM:  Acute on chronic respiratory failure with hypoxia [J96.21] 03/05/2018 Yes    Frailty syndrome in geriatric patient [R54] 10/21/2023 Yes    Hyperkalemia [E87.5] 09/30/2023 Yes    Community acquired pneumonia of left lower lobe of lung [J18.9] 09/29/2023 Yes    Goals of care, counseling/discussion [Z71.89] 09/29/2023 Not Applicable    Chronic obstructive pulmonary disease with acute exacerbation [J44.1]  Yes    ESRD (end stage renal disease) on dialysis [N18.6, Z99.2] 02/19/2020 Not Applicable     Chronic    Chronic respiratory failure with hypoxia, on home O2 therapy [J96.11, Z99.81] 07/18/2019 Not Applicable     Chronic    Shortness of breath [R06.02] 07/17/2019 Yes      Problems Resolved During this Admission:     VTE Risk Mitigation (From admission, onward)           Ordered     heparin (porcine) injection 5,000 Units  Every 12 hours         10/12/23 1406     IP VTE HIGH RISK PATIENT  Once         10/12/23 1406     Place sequential compression device  Until discontinued         10/12/23 1406     Place sequential compression device  Until discontinued         10/12/23 1406     heparin (porcine) injection 4,100 Units  As needed (PRN)         10/12/23 1406                   * Acute on chronic  respiratory failure with hypoxia  Patient with Hypoxic Respiratory failure which is Acute on chronic.  she is not on home oxygen. Supplemental oxygen was provided and noted- Oxygen Concentration (%):  [25-40] 25     .   Signs/symptoms of respiratory failure include- respiratory distress. Contributing diagnoses includes - penumonia.  Labs and images were reviewed. Patient Has recent ABG, which has been reviewed. Will treat underlying causes and adjust management of respiratory failure as follows     Multiple admissions for PNA and SOB   CXR Grossly stable appearing cardiopulmonary findings noting pleural effusions and bilateral basilar subsegmental atelectasis.  Developing left lower lung zone consolidation not excluded.    Started on BiPAP and weaned to 1 L NC  Procal 1.44, elevated from prior  Stop vancomycin  Pulm and Nephro feel this is more due to volume overload due to issues receiving dialysis and fluid removal, L lung findings are less likely recurrent pneumonia  Multiple thoracenteses in the past   ceftriaxone and azithromycin was stopped  Monitor without iv abx  Resp cx, BCx NGTD  Leukocytosis resolved        Goals of care, counseling/discussion  Advance Care Planning      Code Status  In light of the patients advanced and life limiting illness,I engaged the the patient in a voluntary conversation about the patient's preferences for care  at the very end of life. The patient wishes to have a natural, peaceful death.  Along those lines, the patient does not wish to have CPR or other invasive treatments performed when her heart and/or breathing stops. I communicated to the patient that a DNR form was completed and will be scanned into EPIC.       Pt not tolerating dialysis  Palliative on board  Anticipate hospice in am        Family discussion to be held tomorrow      Community acquired pneumonia of left lower lobe of lung  S/p 5 days of treatment  Pneumonia ruled out  Leukocytosis most likely from steroids  which was also stopped        ESRD (end stage renal disease) on dialysis   Nephrology on board  she has been struggling to complete HD sessions over the last 6 weeks and has becoming more and more frail and tachycardic on the machine and there is some concern she will not be able to continue with dialysis  Attempted dialysis again without any benzos to which pt became unresponsive  Palliative on board  New nephrology consult    Sonu Martínez MD  Department of [facility] Medicine   [place] [facility]

## 2023-10-22 NOTE — NURSING
Informed Dr Martínez that pt has only had small amount of bowel movements and requests a stool softener. TORB Dulcolax Daily PRN. Ordered read back and initiated.

## 2023-10-22 NOTE — PLAN OF CARE
Problem: Adult Inpatient Plan of Care  Goal: Plan of Care Review  Outcome: Ongoing, Progressing  Goal: Patient-Specific Goal (Individualized)  Outcome: Ongoing, Progressing  Goal: Absence of Hospital-Acquired Illness or Injury  Outcome: Ongoing, Progressing  Goal: Optimal Comfort and Wellbeing  Outcome: Ongoing, Progressing  Goal: Readiness for Transition of Care  Outcome: Ongoing, Progressing     Problem: Respiratory Compromise (Pneumonia)  Goal: Effective Oxygenation and Ventilation  Outcome: Ongoing, Progressing     Problem: Skin Injury Risk Increased  Goal: Skin Health and Integrity  Outcome: Ongoing, Progressing     Problem: Fall Injury Risk  Goal: Absence of Fall and Fall-Related Injury  Outcome: Ongoing, Progressing     Problem: Coping Ineffective  Goal: Effective Coping  Outcome: Ongoing, Progressing     Problem: COPD (Chronic Obstructive Pulmonary Disease) Comorbidity  Goal: Maintenance of COPD Symptom Control  Outcome: Ongoing, Progressing

## 2023-10-22 NOTE — PT/OT/SLP PROGRESS
Occupational Therapy   Treatment    Name: Katie Quevedo  MRN: [id number]  Admitting Diagnosis:  Acute on chronic respiratory failure with hypoxia       Recommendations:     Discharge Recommendations: Low Intensity Therapy  Discharge Equipment Recommendations:  bedside commode  Barriers to discharge:   none    Assessment:     Katie Quevedo is a 78 y.o. female with a medical diagnosis of Acute on chronic respiratory failure with hypoxia.  She presents with the following performance deficits affecting function: weakness, impaired endurance, impaired self care skills, impaired functional mobility, gait instability, impaired balance, decreased upper extremity function, decreased lower extremity function, decreased safety awareness, pain, impaired cardiopulmonary response to activity. Pt was agreeable to and participated in OT session. During today's session, pt worked on transfers and UE/LE therex to improve strength/endurance.  Pt able to perform sit to stand transfer with CGA using RW, but only performed 1 sit to stand due to fatigue and SOB.  Pt was noted to make progress towards her goals in therapy.  Pt's goals remain appropriate at this time.  She will continue to benefit from skilled OT services in order to assist her with increasing her safety and level of independence with self care and mobility tasks.       Rehab Prognosis:  Good; patient would benefit from acute skilled OT services to address these deficits and reach maximum level of function.       Plan:     Patient to be seen 3 x/week to address the above listed problems via self-care/home management, therapeutic activities, therapeutic exercises  Plan of Care Expires: 11/13/23  Plan of Care Reviewed with: patient, spouse    Subjective     Chief Complaint: SOB - neck/shoulder pain  Patient/Family Comments/goals: regain strength/endurance to walk better  Pain/Comfort:  Pain Rating 1: 4/10  Location - Side 1: Bilateral  Location 1:  (neck and shoulder)  Pain  Addressed 1: Reposition, Distraction, Cessation of Activity (exercises/stretches)  Pain Rating Post-Intervention 1: 3/10    Objective:     Communicated with: nurse prior to session.  Patient found up in chair with oxygen, PureWick, peripheral IV, telemetry upon OT entry to room.    General Precautions: Standard, fall    Orthopedic Precautions:N/A  Braces: N/A  Respiratory Status: Nasal cannula, flow 3 L/min     Occupational Performance:     Bed Mobility:    N/A - sitting in bedside chair before and after tx session     Functional Mobility/Transfers:  Patient completed Sit <> Stand Transfer with contact guard assistance  with  RW - cues to push up with one hand from chair instead of grabbing RW with both hands ( present and able to provide reminder to pt)  - sit<-> stand performed 1x only due to fatigue      Activities of Daily Living:  N/A      [facility] 6 Click ADL: 17    Treatment & Education:  Pt completed UE/LE therex and func mobility activities for tx session  Pt completed 1 set of 10 reps of B UE AROM exercises in order to work towards increasing her UB strength/endurance to assist with mobility and self care skills.  Pt completed the following exercises: shoulder flex/ext, forward punches, elbow flex/ext, and forearm sup/pro.  Pt also completed 1 set of 10 reps of marching and kicks while seated in chair.  Pt encouraged to perform reps slowly and in controlled movements while counting out loud.  All exercises performed sitting in bedside chair.  Pt encouraged to perform exercises at least 3x daily.    Pt noted with tightening and elevation of shoulder when standing - encouraged pt to relax shoulders and taught exercises to decrease tightness in these muscles.  Pt performed 5 shoulder shrugs and shoulder rolls and noted to feel better.   Pt educated on role of OT and POC      Patient left up in chair with all lines intact, call button in reach, and  present    GOALS:   Multidisciplinary Problems        Occupational Therapy Goals          Problem: Occupational Therapy    Goal Priority Disciplines Outcome Interventions   Occupational Therapy Goal     OT, PT/OT Ongoing, Progressing    Description: Goals to be met by: 11/13/2023     Patient will increase functional independence with ADLs by performing:    Grooming while seated with Set-up Assistance.  Toileting from bedside commode with Stand-by Assistance for hygiene and clothing management.   Rolling to Bilateral with Modified [place].   Supine to sit with Modified [place].  Step transfer with Stand-by Assistance & appropriate AD.  Toilet transfer to bedside commode with Stand-by Assistance & appropriate AD.                         Time Tracking:     OT Date of Treatment: 10/22/23  OT Start Time: 1017  OT Stop Time: 1040  OT Total Time (min): 23 min    Billable Minutes:Therapeutic Exercise 23    OT/PAMELA: OT     Number of PAMELA visits since last OT visit: 1    10/22/2023

## 2023-10-22 NOTE — CONSULTS
[facility]  INITIAL EVALUATION      Reason for consult: ESRD on HD, request for second opinion    Impression and recommendations  78 y.o. female admitted to medicine for acute on chronic hypoxic respiratory failure d/t end-stage pulmonary HTN, w/course c/b recurrent and refractory hypotension during hemodialysis sessions. Grave prognosis.    ESRD on HD: last HD 10/20, w/session terminated after syncopal episode during HD w/brief episode of pulselessness that resolved s/p IVF. As prev noted pt has had increasingly poor tolerance for outpatient HD sessions over past few weeks. Suspect that end-stage pulmonary HTN is the primary reason behind exceedingly poor tolerance for HD; seems quite unlikely that medication side effects or emotional state led to these events given that they have occurred in 2 separate sessions with and without anxiolytics and opioids.   - no emergent requirement for hemodialysis at this time, reassess daily  - appreciate thoughtful and appropriate recommendations and care by Dr Avinash lozano/nephrology and Dr Ashley lozano/palliative medicine  - pt well known to pulmonary/critical care service, but would suggest pulmonary consult reassess pt for any available medical mgmt options for end-stage pulmonary HTN, even if only to clarify that no options available that would meaningfully extend life or allow hemodynamic tolerance for hemodialysis (certainly not candidate for advanced tx such as heart/lung transplant)  - given pt and family's quite firm stated request that any and all interventions outside of CPR or intubation be attempted to allow for hemodialysis, there is a remote possibility that HD w/limited BFR and minimal UF may be tolerable and could be at least briefly continued as home hemodialysis  - as noted earlier however, given pt's poor tolerance for even quite modest UF during last 2 HD sessions I am quite concerned that any hemodialysis attempts would lead to recurrent hypotension and quite  possibly sudden cardiac death during session  - furthermore given progressive and irreversible nature of pulmonary HTN, even if HD tolerated now this would at best have only a modest temporizing effect and ultimately pt would face this same clinical situation in short order, which I explained in detail to pt and family  - I reinforced the extremely high risk (including intradialytic demise) and comparatively low benefit of attempting HD to patient and family, who still wished to proceed  - defer to incoming [facility] renal consultant Dr Cutler regarding timing and safety of any further HD attempts this admission  - not candidate for peritoneal dialysis given prior hx multiple abd surgical procedures, as well as unacceptably high risk for intraoperative cardiac event and death; even if fluoroscopically guided PD catheter insertion available in this area, procedural sedation would likely also be quite high risk  - not candidate for CVVH, as this would not address underlying intractable chronic pathology  - pt is certainly appropriate candidate for hospice care at this time    Will continue to follow      History of present illness  78 y.o. female presented with shortness of breath. Admitted to medicine for acute on chronic hypoxic respiratory failure. Prolonged course c/b recurrent episodes of intradialytic hypotension and syncope; please see Dr Da Silva's contemporary notes for details of these events, as well as Dr Ramirez's quite comprehensive notes regarding pt and family discussions about continued care and hospice options.    Review of Systems   Constitutional:  Positive for malaise/fatigue. Negative for chills and fever.   Respiratory:  Positive for shortness of breath. Negative for cough.    Cardiovascular:  Negative for chest pain and leg swelling.   Gastrointestinal:  Negative for abdominal pain, constipation, diarrhea, nausea and vomiting.   Genitourinary:  Negative for dysuria and hematuria.   Musculoskeletal:   Negative for joint pain and myalgias.   Skin:  Negative for itching and rash.   Neurological:  Positive for weakness. Negative for dizziness, focal weakness and headaches.   Psychiatric/Behavioral:  Negative for depression. The patient is nervous/anxious.    All other systems reviewed and are negative.      Past Medical History:   Diagnosis Date    Acute congestive heart failure 02/10/2020    Anemia     Bilateral renal cysts     Cataract     Chronic LBP 7/26/2012    Chronic pain     CKD (chronic kidney disease), stage IV     Colon polyp 2013    COPD (chronic obstructive pulmonary disease)     Dehydration     Encounter for blood transfusion     HTN (hypertension)     Lumbar spondylosis     Melanoma     of the lip    Metabolic bone disease     Migraines, neuralgic     Osteoporosis     Primary osteoarthritis of both knees     s/p Rt TKA    Pulmonary embolism with infarction     Seizures 1972    x1 only    Subdeltoid bursitis, L>R. 9/27/2012    Ulcer     Vitamin D deficiency disease      Past Surgical History:   Procedure Laterality Date    BLADDER SUSPENSION      CATARACT EXTRACTION  11/18/13    left eye    CERVICAL LAMINECTOMY      x3, fusion x1    COLONOSCOPY  2009    DECLOTTING OF ARTERIOVENOUS GRAFT Left 1/3/2022    Procedure: [id number];  Surgeon: Lindsey Louie MD;  Location: [facility] OR;  Service: Vascular;  Laterality: Left;    DECLOTTING OF ARTERIOVENOUS GRAFT Left 2/8/2022    Procedure: [id number];  Surgeon: Lindsey Louie MD;  Location: [facility] OR;  Service: Vascular;  Laterality: Left;    DECLOTTING OF ARTERIOVENOUS GRAFT Left 4/8/2022    Procedure: [id number];  Surgeon: Lindsey Louie MD;  Location: [facility] OR;  Service: Vascular;  Laterality: Left;    FISTULOGRAM N/A 2/27/2020    Procedure: Fistulogram;  Surgeon: Noe Benitez MD;  Location: [facility] CATH LAB/EP;  Service: Cardiology;  Laterality: N/A;    HYSTERECTOMY      JOINT REPLACEMENT  2001    total right knee      LUMBAR LAMINECTOMY      x 3, fusion x1    OOPHORECTOMY      PERIPHERAL ANGIOGRAPHY N/A 3/9/2020    Procedure: Peripheral angiography;  Surgeon: Jacinto Henriquez MD;  Location: [facility] CATH LAB/EP;  Service: Cardiology;  Laterality: N/A;    PLACEMENT OF ARTERIOVENOUS GRAFT Left 1/21/2020    Procedure: INSERTION, GRAFT, ARTERIOVENOUS;  Surgeon: Lindsey Louie MD;  Location: [facility] OR;  Service: General;  Laterality: Left;    PLACEMENT OF ARTERIOVENOUS GRAFT Right 7/22/2022    Procedure: INSERTION, GRAFT, ARTERIOVENOUS;  Surgeon: Lindsey Louie MD;  Location: [facility] OR;  Service: General;  Laterality: Right;    PLACEMENT OF DUAL-LUMEN VASCULAR CATHETER Right 4/16/2022    Procedure: INSERTION-CATHETER-RICKI;  Surgeon: Lindsey Louie MD;  Location: [facility] OR;  Service: General;  Laterality: Right;    THROMBECTOMY Left 2/3/2020    Procedure: THROMBECTOMY;  Surgeon: Lindsey Louie MD;  Location: [facility] OR;  Service: General;  Laterality: Left;    THROMBECTOMY  3/9/2020    Procedure: Thrombectomy;  Surgeon: Jacinto Henriquez MD;  Location: [facility] CATH LAB/EP;  Service: Cardiology;;    THROMBECTOMY Left 4/7/2022    Procedure: THROMBECTOMY;  Surgeon: Lindsey Louie MD;  Location: [facility] OR;  Service: General;  Laterality: Left;     Family History   Problem Relation Age of Onset    Arthritis Mother     Stroke Mother     Hypertension Father     Cancer Father     Cataracts Father     Diabetes Maternal Aunt     Hypertension Maternal Grandfather     Heart disease Maternal Grandfather     Heart attack Maternal Grandfather     Cataracts Sister     Glaucoma Cousin      Social History     Socioeconomic History    Marital status:    Tobacco Use    Smoking status: Former     Types: Cigarettes    Smokeless tobacco: Former     Quit date: 2/3/2015   Substance and Sexual Activity    Alcohol use: Not Currently     Comment: Rare    Drug use: No    Sexual activity: Never     Partners: Male     Social Determinants of  Health     Financial Resource Strain: Low Risk  (10/13/2023)    Overall Financial Resource Strain (CARDIA)     Difficulty of Paying Living Expenses: Not hard at all   Food Insecurity: No Food Insecurity (10/13/2023)    Hunger Vital Sign     Worried About Running Out of Food in the Last Year: Never true     Ran Out of Food in the Last Year: Never true   Transportation Needs: No Transportation Needs (10/13/2023)    PRAPARE - Transportation     Lack of Transportation (Medical): No     Lack of Transportation (Non-Medical): No   Physical Activity: Inactive (10/13/2023)    Exercise Vital Sign     Days of Exercise per Week: 0 days     Minutes of Exercise per Session: 0 min   Stress: No Stress Concern Present (4/8/2022)    [employer] [place] [place] [employer] - Occupational Stress Questionnaire     Feeling of Stress : Only a little   Social Connections: Unknown (10/13/2023)    Social Connection and Isolation Panel [NHANES]     Frequency of Communication with Friends and Family: More than three times a week     Frequency of Social Gatherings with Friends and Family: More than three times a week     Marital Status:    Housing Stability: Low Risk  (10/13/2023)    Housing Stability Vital Sign     Unable to Pay for Housing in the Last Year: No     Number of Places Lived in the Last Year: 1     Unstable Housing in the Last Year: No         Medications  Scheduled Meds:   albuterol-ipratropium  3 mL Nebulization Q4H    busPIRone  10 mg Oral Once per day on Mon Wed Fri    epoetin hemant-epbx  10,000 Units Subcutaneous Every Mon, Wed, Fri    fluticasone furoate-vilanteroL  1 puff Inhalation Daily    heparin (porcine)  5,000 Units Subcutaneous Q12H    megestroL  20 mg Oral BID    midodrine  15 mg Oral TID WM    mirtazapine  7.5 mg Oral QHS    mupirocin   Nasal BID    sevelamer carbonate  800 mg Oral TID WM    vitamin renal formula (B-complex-vitamin c-folic acid)  1 capsule Oral Daily     Continuous Infusions:  PRN  Meds:.sodium chloride 0.9%, sodium chloride 0.9%, acetaminophen, albumin human 25%, albuterol sulfate, carbamide peroxide, heparin (porcine), hydrOXYzine HCL, melatonin, ondansetron, sodium chloride 0.9%, sodium chloride 0.9%, sodium chloride 0.9%, sodium chloride 0.9%, sodium chloride 0.9%, sodium chloride 0.9%, sodium chloride 0.9%, traMADoL    Vitals  Temp:  [96.2 °F (35.7 °C)-99 °F (37.2 °C)] 96.2 °F (35.7 °C)  Pulse:  [101-197] 105  Resp:  [17-26] 22  SpO2:  [92 %-96 %] 95 %  BP: ()/(51-99) 161/99    Physical Exam  Vitals and nursing note reviewed. Exam conducted with a chaperone present.   Constitutional:       General: She is awake. She is not in acute distress.     Appearance: She is cachectic. She is ill-appearing. She is not toxic-appearing or diaphoretic.      Interventions: Nasal cannula in place.   HENT:      Head: Normocephalic and atraumatic.   Eyes:      General: No scleral icterus.     Conjunctiva/sclera: Conjunctivae normal.   Cardiovascular:      Rate and Rhythm: Regular rhythm. Tachycardia present.   Pulmonary:      Effort: Pulmonary effort is normal. No respiratory distress.   Musculoskeletal:      Cervical back: Normal range of motion and neck supple.      Right lower leg: No edema.      Left lower leg: No edema.   Skin:     General: Skin is warm and dry.      Coloration: Skin is not jaundiced or pale.      Findings: No erythema or rash.   Neurological:      General: No focal deficit present.      Mental Status: She is alert.   Psychiatric:         Behavior: Behavior is cooperative.         Relevant laboratory results, imaging, and diagnostic studies reviewed

## 2023-10-23 PROBLEM — E87.20 METABOLIC ACIDOSIS: Status: ACTIVE | Noted: 2023-10-23

## 2023-10-23 PROCEDURE — 94660 CPAP INITIATION&MGMT: CPT | Mod: HCNC

## 2023-10-23 PROCEDURE — 99900035 HC TECH TIME PER 15 MIN (STAT): Mod: HCNC

## 2023-10-23 PROCEDURE — 25000003 PHARM REV CODE 250: Mod: HCNC | Performed by: STUDENT IN AN ORGANIZED HEALTH CARE EDUCATION/TRAINING PROGRAM

## 2023-10-23 PROCEDURE — 99233 SBSQ HOSP IP/OBS HIGH 50: CPT | Mod: HCNC,,, | Performed by: STUDENT IN AN ORGANIZED HEALTH CARE EDUCATION/TRAINING PROGRAM

## 2023-10-23 PROCEDURE — 63600175 PHARM REV CODE 636 W HCPCS: Mod: HCNC | Performed by: FAMILY MEDICINE

## 2023-10-23 PROCEDURE — 94640 AIRWAY INHALATION TREATMENT: CPT | Mod: HCNC

## 2023-10-23 PROCEDURE — 25000003 PHARM REV CODE 250: Mod: HCNC | Performed by: FAMILY MEDICINE

## 2023-10-23 PROCEDURE — 25000242 PHARM REV CODE 250 ALT 637 W/ HCPCS: Mod: HCNC | Performed by: FAMILY MEDICINE

## 2023-10-23 PROCEDURE — 27100171 HC OXYGEN HIGH FLOW UP TO 24 HOURS: Mod: HCNC

## 2023-10-23 PROCEDURE — 94761 N-INVAS EAR/PLS OXIMETRY MLT: CPT | Mod: HCNC

## 2023-10-23 PROCEDURE — 99233 PR SUBSEQUENT HOSPITAL CARE,LEVL III: ICD-10-PCS | Mod: HCNC,,, | Performed by: STUDENT IN AN ORGANIZED HEALTH CARE EDUCATION/TRAINING PROGRAM

## 2023-10-23 PROCEDURE — 97530 THERAPEUTIC ACTIVITIES: CPT | Mod: HCNC,CO

## 2023-10-23 PROCEDURE — 11000001 HC ACUTE MED/SURG PRIVATE ROOM: Mod: HCNC

## 2023-10-23 PROCEDURE — 97530 THERAPEUTIC ACTIVITIES: CPT | Mod: HCNC,CQ

## 2023-10-23 RX ADMIN — SEVELAMER CARBONATE 800 MG: 800 TABLET, FILM COATED ORAL at 06:10

## 2023-10-23 RX ADMIN — SEVELAMER CARBONATE 800 MG: 800 TABLET, FILM COATED ORAL at 08:10

## 2023-10-23 RX ADMIN — HYDROCODONE BITARTRATE AND ACETAMINOPHEN 1 TABLET: 10; 325 TABLET ORAL at 10:10

## 2023-10-23 RX ADMIN — ACETAMINOPHEN 325MG 650 MG: 325 TABLET ORAL at 03:10

## 2023-10-23 RX ADMIN — HEPARIN SODIUM 5000 UNITS: 5000 INJECTION INTRAVENOUS; SUBCUTANEOUS at 08:10

## 2023-10-23 RX ADMIN — HYDROXYZINE HYDROCHLORIDE 25 MG: 25 TABLET ORAL at 08:10

## 2023-10-23 RX ADMIN — HYDROCODONE BITARTRATE AND ACETAMINOPHEN 1 TABLET: 10; 325 TABLET ORAL at 03:10

## 2023-10-23 RX ADMIN — NEPHROCAP 1 CAPSULE: 1 CAP ORAL at 08:10

## 2023-10-23 RX ADMIN — HYDROCODONE BITARTRATE AND ACETAMINOPHEN 1 TABLET: 10; 325 TABLET ORAL at 08:10

## 2023-10-23 RX ADMIN — HYDROXYZINE HYDROCHLORIDE 25 MG: 25 TABLET ORAL at 10:10

## 2023-10-23 RX ADMIN — IPRATROPIUM BROMIDE AND ALBUTEROL SULFATE 3 ML: 2.5; .5 SOLUTION RESPIRATORY (INHALATION) at 12:10

## 2023-10-23 RX ADMIN — TRAMADOL HYDROCHLORIDE 50 MG: 50 TABLET, COATED ORAL at 06:10

## 2023-10-23 RX ADMIN — BUSPIRONE HYDROCHLORIDE 10 MG: 5 TABLET ORAL at 08:10

## 2023-10-23 RX ADMIN — IPRATROPIUM BROMIDE AND ALBUTEROL SULFATE 3 ML: 2.5; .5 SOLUTION RESPIRATORY (INHALATION) at 07:10

## 2023-10-23 RX ADMIN — SEVELAMER CARBONATE 800 MG: 800 TABLET, FILM COATED ORAL at 12:10

## 2023-10-23 RX ADMIN — Medication 6 MG: at 08:10

## 2023-10-23 RX ADMIN — MEGESTROL ACETATE 20 MG: 20 TABLET ORAL at 09:10

## 2023-10-23 RX ADMIN — FLUTICASONE FUROATE AND VILANTEROL TRIFENATATE 1 PUFF: 200; 25 POWDER RESPIRATORY (INHALATION) at 07:10

## 2023-10-23 RX ADMIN — IPRATROPIUM BROMIDE AND ALBUTEROL SULFATE 3 ML: 2.5; .5 SOLUTION RESPIRATORY (INHALATION) at 03:10

## 2023-10-23 RX ADMIN — MIDODRINE HYDROCHLORIDE 15 MG: 5 TABLET ORAL at 08:10

## 2023-10-23 RX ADMIN — IPRATROPIUM BROMIDE AND ALBUTEROL SULFATE 3 ML: 2.5; .5 SOLUTION RESPIRATORY (INHALATION) at 11:10

## 2023-10-23 RX ADMIN — MEGESTROL ACETATE 20 MG: 20 TABLET ORAL at 08:10

## 2023-10-23 RX ADMIN — IPRATROPIUM BROMIDE AND ALBUTEROL SULFATE 3 ML: 2.5; .5 SOLUTION RESPIRATORY (INHALATION) at 04:10

## 2023-10-23 RX ADMIN — MIRTAZAPINE 7.5 MG: 7.5 TABLET ORAL at 08:10

## 2023-10-23 NOTE — NURSING
Rapid Response Nurse Follow-up Note     Followed up with patient for proactive rounding.   Chart reviewed due to MEWS score of 4. No acute issues at this time. VSS.     Please call Rapid Response RN, ELAINE LONGO RN with any questions or concerns at N Phone: [phone].

## 2023-10-23 NOTE — SUBJECTIVE & OBJECTIVE
Interval History:  Pt. Sitting up in chair with family, trying to eat lunch with daughter/  @ BS- in no distress - Alert and oriented to person, place time and situation.     Primary team completed rounds earlier discussing plans regarding dialysis and pt states she does not want dialysis and wants to discuss hospice.  Denies SOB, weakness or pain.    Review of patient's allergies indicates:   Allergen Reactions    Aspirin      Other reaction(s): hx of ulcers    Tetracycline Swelling     Other reaction(s): Swelling    Penicillins Rash     Other reaction(s): Hives  Other reaction(s): Rash  Other reaction(s): Rash  Other reaction(s): Hives     Current Facility-Administered Medications   Medication Frequency    0.9%  NaCl infusion PRN    0.9%  NaCl infusion PRN    acetaminophen tablet 650 mg Q4H PRN    albumin human 25% bottle 25 g PRN    albuterol sulfate nebulizer solution 2.5 mg Q4H PRN    albuterol-ipratropium 2.5 mg-0.5 mg/3 mL nebulizer solution 3 mL Q4H    bisacodyL EC tablet 5 mg Daily PRN    busPIRone tablet 10 mg Once per day on Mon Wed Fri    carbamide peroxide 6.5 % otic solution 5 drop PRN    epoetin hemant-epbx injection 10,000 Units Every Mon, Wed, Fri    fluticasone furoate-vilanteroL 200-25 mcg/dose diskus inhaler 1 puff Daily    heparin (porcine) injection 4,100 Units PRN    heparin (porcine) injection 5,000 Units Q12H    HYDROcodone-acetaminophen  mg per tablet 1 tablet Q6H PRN    hydrOXYzine HCL tablet 25 mg TID PRN    megestroL tablet 20 mg BID    melatonin tablet 6 mg Nightly PRN    midodrine tablet 15 mg TID WM    mirtazapine tablet 7.5 mg QHS    ondansetron injection 4 mg Q6H PRN    sevelamer carbonate tablet 800 mg TID WM    sodium chloride 0.9% bolus 250 mL 250 mL PRN    sodium chloride 0.9% bolus 250 mL 250 mL PRN    sodium chloride 0.9% bolus 250 mL 250 mL PRN    sodium chloride 0.9% bolus 250 mL 250 mL PRN    sodium chloride 0.9% bolus 250 mL 250 mL PRN    sodium chloride 0.9%  "flush 10 mL PRN    sodium chloride 0.9% flush 2 mL PRN    traMADoL tablet 50 mg BID PRN    vitamin renal formula (B-complex-vitamin c-folic acid) 1 mg per capsule 1 capsule Daily       Objective:     Vital Signs (Most Recent):  Temp: 98 °F (36.7 °C) (10/23/23 1221)  Pulse: 99 (10/23/23 1626)  Resp: 18 (10/23/23 1530)  BP: 127/85 (10/23/23 1221)  SpO2: 96 % (10/23/23 1530) Vital Signs (24h Range):  Temp:  [96.2 °F (35.7 °C)-98 °F (36.7 °C)] 98 °F (36.7 °C)  Pulse:  [] 99  Resp:  [18-22] 18  SpO2:  [94 %-99 %] 96 %  BP: (113-137)/(60-85) 127/85     Weight: 40.5 kg (89 lb 5.1 oz) (10/20/23 0500)  Body mass index is 16.34 kg/m².  Body surface area is 1.33 meters squared.    I/O last 3 completed shifts:  In: 125 [P.O.:125]  Out: 100 [Urine:100]     Physical Exam  Vitals reviewed.   Constitutional:       General: She is not in acute distress.     Appearance: Normal appearance. She is not ill-appearing.   Cardiovascular:      Rate and Rhythm: Normal rate and regular rhythm.      Heart sounds: Normal heart sounds.      No friction rub.   Pulmonary:      Effort: Pulmonary effort is normal.      Breath sounds: Normal breath sounds.   Abdominal:      General: Bowel sounds are normal.      Palpations: Abdomen is soft.   Skin:     General: Skin is warm and dry.   Neurological:      General: No focal deficit present.      Mental Status: She is alert and oriented to person, place, and time. Mental status is at baseline.   Psychiatric:         Mood and Affect: Mood normal.         Behavior: Behavior normal.         Thought Content: Thought content normal.         Judgment: Judgment normal.          Significant Labs:  Cardiac Markers: No results for input(s): "CKMB", "TROPONINT", "MYOGLOBIN" in the last 168 hours.  CBC:   Recent Labs   Lab 10/22/23  1755   WBC 8.69   RBC 3.05*   HGB 10.1*   HCT 31.0*      *   MCH 33.1*   MCHC 32.6     CMP:   Recent Labs   Lab 10/22/23  1755   *   CALCIUM 8.9   ALBUMIN 3.3* "   PROT 7.1   *   K 4.2   CO2 22*   CL 90*   BUN 73*   CREATININE 8.7*   ALKPHOS 64   ALT 27   AST 16   BILITOT 0.5        Significant Imaging:  Labs: Reviewed  X-Ray: Reviewed  US: Reviewed

## 2023-10-23 NOTE — ASSESSMENT & PLAN NOTE
ESRD- Pt not dialyzed since last Friday when she became unresponsive-labs remain stable with no clinical indication for dialysis today.  Pt. States that she does not want to try dialysis again and wants to discuss hospice options @ home.   She understands the risks/ benefits of RRT and does not want dialysis due to her fear of the becoming unresponsive again or continuing to have hypotension throughout her treatments which she says has been an ongoing and now worsened problem during this hospital course.  Daughter has been contacted by hospice to discuss further.  Continue to monitor labs and clinical volume status and reassess in am.

## 2023-10-23 NOTE — PLAN OF CARE
"   10/23/23 0847   Post-Acute Status   Post-Acute Authorization Dialysis   Diaylsis Status Discharge Plan Changed  (Pending family decision on goals of care regarding HD. Currently with 2nd opinion HD nor PD nor CRRT are recommended.)   Discharge Delays Orders Needed   Discharge Plan   Discharge Plan A Other  (TBD)       Future Appointments   Date Time Provider Department Center   11/2/2023 10:00 AM Pushpa Mcmahon MD [facility] PHYSMED [facility]   11/6/2023  3:30 PM Giancarlo Rust MD [facility] PULMSVC [facility]     /60 (BP Location: Left leg, Patient Position: Lying)   Pulse 110   Temp 97.8 °F (36.6 °C)   Resp 18   Ht 5' 2" (1.575 m)   Wt 40.5 kg (89 lb 5.1 oz)   LMP  (LMP Unknown)   SpO2 (!) 94%   Breastfeeding No   BMI 16.34 kg/m²      albuterol-ipratropium  3 mL Nebulization Q4H    busPIRone  10 mg Oral Once per day on Mon Wed Fri    epoetin hemant-epbx  10,000 Units Subcutaneous Every Mon, Wed, Fri    fluticasone furoate-vilanteroL  1 puff Inhalation Daily    heparin (porcine)  5,000 Units Subcutaneous Q12H    megestroL  20 mg Oral BID    midodrine  15 mg Oral TID WM    mirtazapine  7.5 mg Oral QHS    sevelamer carbonate  800 mg Oral TID WM    vitamin renal formula (B-complex-vitamin c-folic acid)  1 capsule Oral Daily     Consults (From admission, onward)          Status Ordering Provider     Inpatient consult to Nephrology-[facility]  Once        Provider:  (Not yet assigned)    Completed JACKLYN OVIEDO     Inpatient consult to Nephrology-[facility]  Once        Provider:  (Not yet assigned)    Completed WILBERTO CARR     Inpatient consult to Registered Dietitian/Nutritionist  Once        Provider:  (Not yet assigned)    Completed JUSTO VINCENT     Inpatient consult to Palliative Care  Once        Provider:  Nicolas Ramirez Jr., MD    Completed JUSTO VINCENT     Inpatient consult to Midline team  Once        Provider:  (Not yet assigned)    Acknowledged WILBERTO CARR     Consult to " Pulmonology  Once        Provider:  Roxana Matta MD    Completed MIN PONCE     Inpatient consult to Nephrology-[facility] (Caterina Bustillos Nimkevych)  Once        Provider:  Ramo Da Silva MD    Completed WILBERTO CARR

## 2023-10-23 NOTE — PROGRESS NOTES
Palliative Care Daily Progress Note     S: Medical record reviewed, followed up with patient and family regarding patient's condition. 2nd opinion from nephrology was obtained over the weekend with Dr. Avilez concurring pt is no longer a candidate for intermittent HD.     There was apparently a mention of essentially homeopathic dose HD at home however pt is stating consistently that she does not want to receive any degree of HD and I reassured her that such a procedure would only humor others while worsening her own anxiety, prognosis and loss of independence. Family states they are very appreciative for the in-depth discussion of pt's underlying complex pathophysiology with Dr. Avilez and now accept that there are immovable barriers to further HD.    Pt's  expresses understandable concerns around whether he is prepared for pt to pass away at home and it is clear he will need live-in support as neither pt nor family would accept inpt hospice care.    They have agreed to a referral to [facility] for inpatient hospice care and no further HD. Offered emotional support to all present and family members are grateful and visibly far more at peace than at last visit.    B: Code Status: DNR   Advanced Directives:   [place] on file stating open to HD otherwise DNR/DNI no life support  Family/Support: supportive, involved, now agreeable to hospice   Physician's Plan of Care Goal is home with hospice ASAP.   Labs: noncontributory  Diagnostics: noncontributory     Physical Exam  Constitutional:       General: She is alert. She is not in acute distress. She has a pleasant affect and unquestioned capacity.     Appearance: She is cachectic. She is ill-appearing. She is not toxic-appearing or diaphoretic.      Interventions: Nasal cannula in place.   HENT:      Head: Normocephalic and atraumatic.      Comments: Temporal wasting     Mouth/Throat:      Mouth: Mucous membranes are moist.   Cardiovascular:       Rate and Rhythm: Normal rate and regular rhythm.      Pulses: Normal pulses.   Pulmonary:      Effort: Pulmonary effort is normal. No accessory muscle usage, respiratory distress or retractions.      Breath sounds: Examination of the right-lower field reveals decreased breath sounds. Examination of the left-lower field reveals decreased breath sounds. Decreased breath sounds present.   Abdominal:      General: Abdomen is flat. There is no distension.      Palpations: Abdomen is soft.      Tenderness: There is no abdominal tenderness.   Musculoskeletal:         General: No swelling. Normal range of motion.   Skin:     General: Skin is warm.      Coloration: Skin is pale.    A: Patient's current condition terminally ill but pleasant, lucid and at peace.   Patient Symptom Assessment Flowsheet has been completed.   Family is present, supportive and appropriately involved.  Discharge Planning: home with hospice -- [facility] consulted    R: Support offered at this time.   Palliative Care Team will continue to follow patient.     Nicolas Ramirez MD  Hospice and Palliative Medicine  Palliative Care Pager: [phone]    Total time spent: 53 minutes  > 50% of 53 minute visit spent in chart review, face to face discussion of symptoms assessment, coordination of care with other specialties, and home hospice discharge planning.

## 2023-10-23 NOTE — ASSESSMENT & PLAN NOTE
Patient with Hypoxic Respiratory failure which is Acute on chronic.  she is not on home oxygen. Supplemental oxygen was provided and noted- Oxygen Concentration (%):  [25] 25    .   Signs/symptoms of respiratory failure include- respiratory distress. Contributing diagnoses includes - penumonia.  Labs and images were reviewed. Patient Has recent ABG, which has been reviewed. Will treat underlying causes and adjust management of respiratory failure as follows    Multiple admissions for PNA and SOB   CXR Grossly stable appearing cardiopulmonary findings noting pleural effusions and bilateral basilar subsegmental atelectasis.  Developing left lower lung zone consolidation not excluded.    Started on BiPAP and weaned to 1 L NC  Procal 1.44, elevated from prior  Stop vancomycin  Pulm and Nephro feel this is more due to volume overload due to issues receiving dialysis and fluid removal, L lung findings are less likely recurrent pneumonia  Multiple thoracenteses in the past   ceftriaxone and azithromycin was stopped  Monitor without iv abx  Resp cx, BCx NGTD  Leukocytosis resolved

## 2023-10-23 NOTE — PROGRESS NOTES
[facility] - Telemetry  Nephrology  Progress Note    Patient Name: Katie Quevedo  MRN: [id number]  Admission Date: 10/12/2023  Hospital Length of Stay: 11 days  Attending Provider: Andreia Salazar MD   Primary Care Physician: Kingston Verduzco MD  Principal Problem:Acute on chronic respiratory failure with hypoxia    Subjective:     HPI: No notes on file    Interval History:  Pt. Sitting up in chair with family, trying to eat lunch with daughter/  @ BS- in no distress - Alert and oriented to person, place time and situation.     Primary team completed rounds earlier discussing plans regarding dialysis and pt states she does not want dialysis and wants to discuss hospice.  Denies SOB, weakness or pain.    Review of patient's allergies indicates:   Allergen Reactions    Aspirin      Other reaction(s): hx of ulcers    Tetracycline Swelling     Other reaction(s): Swelling    Penicillins Rash     Other reaction(s): Hives  Other reaction(s): Rash  Other reaction(s): Rash  Other reaction(s): Hives     Current Facility-Administered Medications   Medication Frequency    0.9%  NaCl infusion PRN    0.9%  NaCl infusion PRN    acetaminophen tablet 650 mg Q4H PRN    albumin human 25% bottle 25 g PRN    albuterol sulfate nebulizer solution 2.5 mg Q4H PRN    albuterol-ipratropium 2.5 mg-0.5 mg/3 mL nebulizer solution 3 mL Q4H    bisacodyL EC tablet 5 mg Daily PRN    busPIRone tablet 10 mg Once per day on Mon Wed Fri    carbamide peroxide 6.5 % otic solution 5 drop PRN    epoetin hemant-epbx injection 10,000 Units Every Mon, Wed, Fri    fluticasone furoate-vilanteroL 200-25 mcg/dose diskus inhaler 1 puff Daily    heparin (porcine) injection 4,100 Units PRN    heparin (porcine) injection 5,000 Units Q12H    HYDROcodone-acetaminophen  mg per tablet 1 tablet Q6H PRN    hydrOXYzine HCL tablet 25 mg TID PRN    megestroL tablet 20 mg BID    melatonin tablet 6 mg Nightly PRN    midodrine tablet 15 mg TID WM     mirtazapine tablet 7.5 mg QHS    ondansetron injection 4 mg Q6H PRN    sevelamer carbonate tablet 800 mg TID WM    sodium chloride 0.9% bolus 250 mL 250 mL PRN    sodium chloride 0.9% bolus 250 mL 250 mL PRN    sodium chloride 0.9% bolus 250 mL 250 mL PRN    sodium chloride 0.9% bolus 250 mL 250 mL PRN    sodium chloride 0.9% bolus 250 mL 250 mL PRN    sodium chloride 0.9% flush 10 mL PRN    sodium chloride 0.9% flush 2 mL PRN    traMADoL tablet 50 mg BID PRN    vitamin renal formula (B-complex-vitamin c-folic acid) 1 mg per capsule 1 capsule Daily       Objective:     Vital Signs (Most Recent):  Temp: 98 °F (36.7 °C) (10/23/23 1221)  Pulse: 99 (10/23/23 1626)  Resp: 18 (10/23/23 1530)  BP: 127/85 (10/23/23 1221)  SpO2: 96 % (10/23/23 1530) Vital Signs (24h Range):  Temp:  [96.2 °F (35.7 °C)-98 °F (36.7 °C)] 98 °F (36.7 °C)  Pulse:  [] 99  Resp:  [18-22] 18  SpO2:  [94 %-99 %] 96 %  BP: (113-137)/(60-85) 127/85     Weight: 40.5 kg (89 lb 5.1 oz) (10/20/23 0500)  Body mass index is 16.34 kg/m².  Body surface area is 1.33 meters squared.    I/O last 3 completed shifts:  In: 125 [P.O.:125]  Out: 100 [Urine:100]     Physical Exam  Vitals reviewed.   Constitutional:       General: She is not in acute distress.     Appearance: Normal appearance. She is not ill-appearing.   Cardiovascular:      Rate and Rhythm: Normal rate and regular rhythm.      Heart sounds: Normal heart sounds.      No friction rub.   Pulmonary:      Effort: Pulmonary effort is normal.      Breath sounds: Normal breath sounds.   Abdominal:      General: Bowel sounds are normal.      Palpations: Abdomen is soft.   Skin:     General: Skin is warm and dry.   Neurological:      General: No focal deficit present.      Mental Status: She is alert and oriented to person, place, and time. Mental status is at baseline.   Psychiatric:         Mood and Affect: Mood normal.         Behavior: Behavior normal.         Thought Content: Thought  "content normal.         Judgment: Judgment normal.          Significant Labs:  Cardiac Markers: No results for input(s): "CKMB", "TROPONINT", "MYOGLOBIN" in the last 168 hours.  CBC:   Recent Labs   Lab 10/22/23  1755   WBC 8.69   RBC 3.05*   HGB 10.1*   HCT 31.0*      *   MCH 33.1*   MCHC 32.6     CMP:   Recent Labs   Lab 10/22/23  1755   *   CALCIUM 8.9   ALBUMIN 3.3*   PROT 7.1   *   K 4.2   CO2 22*   CL 90*   BUN 73*   CREATININE 8.7*   ALKPHOS 64   ALT 27   AST 16   BILITOT 0.5        Significant Imaging:  Labs: Reviewed  X-Ray: Reviewed  US: Reviewed    Assessment/Plan:     Pulmonary  Shortness of breath  SOB-  etiology multifactorial - due to COPD/ pulmonary HTN and pulmonary edema- - remain stable with supp oxygen - NC and no resp distress or symptoms.    Renal/  Metabolic acidosis  Metabolic acidosis- stable- cont to monitor labs and add oral bicarbonate supp as needed to correct.    ESRD (end stage renal disease) on dialysis  ESRD- Pt not dialyzed since last Friday when she became unresponsive-labs remain stable with no clinical indication for dialysis today.  Pt. States that she does not want to try dialysis again and wants to discuss hospice options @ home.   She understands the risks/ benefits of RRT and does not want dialysis due to her fear of the becoming unresponsive again or continuing to have hypotension throughout her treatments which she says has been an ongoing and now worsened problem during this hospital course.  Daughter has been contacted by hospice to discuss further.  Continue to monitor labs and clinical volume status and reassess in am.        Thank you for your consult. I will follow-up with patient. Please contact us if you have any additional questions.    Lana Cutler MD  Nephrology  [place] - Telemetry  "

## 2023-10-23 NOTE — PLAN OF CARE
Family is asking if pt is a candidate for home hemodialysis.  The only [facility] that provides home HD services is [facility] [phone].  I faxed referral to the facility [phone].  Waiting for them to review and call me back.       10/23/23 1224   Post-Acute Status   Post-Acute Authorization Dialysis   Diaylsis Status Referrals Sent       Darrian Guerrero RN   Supervisor Case Management-[facility]  [phone]

## 2023-10-23 NOTE — ASSESSMENT & PLAN NOTE
Nephrology on board  she has been struggling to complete HD sessions over the last 6 weeks and has becoming more and more frail and tachycardic on the machine and there is some concern she will not be able to continue with dialysis  Attempted dialysis again without any benzos to which pt became unresponsive  Palliative on board  Anticipate d/c in am    10/23  Patient and family are asking about home dialysis. Case management sent referral to [facility]. She is certainly appropriate candidate for hospice.

## 2023-10-23 NOTE — PT/OT/SLP PROGRESS
Occupational Therapy   Treatment    Name: Katie Quevedo  MRN: [id number]  Admitting Diagnosis:  Acute on chronic respiratory failure with hypoxia       Recommendations:     Discharge Recommendations: Low Intensity Therapy  Discharge Equipment Recommendations:  bedside commode  Barriers to discharge:  Other (Comment) (increased level of assistance)    Assessment:     Katie Quevedo is a 78 y.o. female with a medical diagnosis of Acute on chronic respiratory failure with hypoxia.  Performance deficits affecting function are weakness, impaired endurance, impaired self care skills, impaired functional mobility, gait instability, impaired balance, decreased upper extremity function, decreased lower extremity function, impaired coordination, impaired cardiopulmonary response to activity.    Pt found in bed, agreeable to therapy.  Pt with fair tolerance of therapy 2/2 increased WOB noted with minimal exertion. Continue OT services to address functional goals, progressing as able.      Rehab Prognosis:  Fair; patient would benefit from acute skilled OT services to address these deficits and reach maximum level of function.       Plan:     Patient to be seen 3 x/week to address the above listed problems via self-care/home management, therapeutic activities, therapeutic exercises  Plan of Care Expires: 11/13/23  Plan of Care Reviewed with: patient, daughter    Subjective     Chief Complaint: yes, I'll sit in chair.   Patient/Family Comments/goals: to return to PLOF  Pain/Comfort:  Pain Rating 1: 0/10  Pain Rating Post-Intervention 1: 0/10    Objective:     Communicated with: RN prior to session.  Patient found HOB elevated with oxygen, peripheral IV, telemetry upon OT entry to room.    General Precautions: Standard, fall    Orthopedic Precautions:N/A  Braces: N/A  Respiratory Status: Nasal cannula     Occupational Performance:     Bed Mobility:    Patient completed Rolling/Turning to Left with  stand by assistance  Patient completed  Scooting/Bridging with stand by assistance to scoot seated EOB  Patient completed Supine to Sit with stand by assistance, HOB elevated,  increased effort    Functional Mobility/Transfers:  Patient completed Sit <> Stand Transfer with contact guard assistance  with  rolling walker   Patient completed Bed <> Chair Transfer using Stand Pivot technique with contact guard assistance and minimum assistance with rolling walker  Functional Mobility: Pt took 3 steps with CGA/Min A using RW.  Pt requires vcs to push RW vs picking up.       [facility] 6 Click ADL: 17    Treatment & Education:  Pt sat EOB with SBA.   Pt instructed on Pursed Lip Breathing Technique requiring min verbal cues to perform efficiently.     Pt instructed on energy conservation-conserving energy for eating, toileting, etc and to not do too much at one time.   Encouraged OOB 1-2 hours then returning to bed for rest and then getting back OOB for lunch/dinner time if able.  Pt verbalizes understanding.    *Pt reports 3/10 on Mod Morales perceived exertion scale during session.  She is noted with conversational dyspnea.       Patient left up in chair with all lines intact, call button in reach, RN notified, and dtr present    GOALS:   Multidisciplinary Problems       Occupational Therapy Goals          Problem: Occupational Therapy    Goal Priority Disciplines Outcome Interventions   Occupational Therapy Goal     OT, PT/OT Ongoing, Progressing    Description: Goals to be met by: 11/13/2023     Patient will increase functional independence with ADLs by performing:    Grooming while seated with Set-up Assistance.  Toileting from bedside commode with Stand-by Assistance for hygiene and clothing management.   Rolling to Bilateral with Modified [place].   Supine to sit with Modified [place].  Step transfer with Stand-by Assistance & appropriate AD.  Toilet transfer to bedside commode with Stand-by Assistance & appropriate AD.                         Time  Tracking:     OT Date of Treatment: 10/23/23  OT Start Time: 1000  OT Stop Time: 1013  OT Total Time (min): 13 min    Billable Minutes:Therapeutic Activity 13            10/23/2023

## 2023-10-23 NOTE — PLAN OF CARE
10/23/23 4860   Post-Acute Status   Post-Acute Authorization Hospice   Hospice Status Referrals Sent  (Referral info updated and sent via [employer]. Possible need for IP Hospice vs home with hospice. Pt is no longer a candidate for HD. Msg left with [facility] after hours staff about DC today vs tmrw.)

## 2023-10-23 NOTE — PROGRESS NOTES
[facility] Medicine  Progress Note    Patient Name: Katie Quevedo  MRN: [id number]  Patient Class: IP- Inpatient   Admission Date: 10/12/2023  Length of Stay: 11 days  Attending Physician: Andreia Salazar MD  Primary Care Provider: Kingston Verduzco MD        Subjective:     Principal Problem:Acute on chronic respiratory failure with hypoxia        HPI:  Katie Quevedo is a 78 y.o.  female who  has a past medical history of Acute congestive heart failure (02/10/2020), Anemia, Bilateral renal cysts, Cataract, Chronic LBP (7/26/2012), Chronic pain, CKD (chronic kidney disease), stage IV, Colon polyp (2013), COPD (chronic obstructive pulmonary disease), Dehydration, Encounter for blood transfusion, HTN (hypertension), Lumbar spondylosis, Melanoma, Metabolic bone disease, Migraines, neuralgic, Osteoporosis, Primary osteoarthritis of both knees, Pulmonary embolism with infarction, Seizures (1972), Subdeltoid bursitis, L>R. (9/27/2012), Ulcer, and Vitamin D deficiency disease.. The patient presented to [facility] on 10/12/2023 with a primary complaint of Shortness of Breath (Pt presents to the ED for sob. Pt presents on a neb mask at 8lpm .  Pt has been hospitalized recently for pneumonia)     The patient was in their usual state of health until 2 days ago when she was discharge from [facility] for respiratory failure 2/2 pneumonia, COPD. She has had 2 other hospital admissions in the last 2 weeks. She was barely able to finish her HD session yesterday. Spoke with Dr. Montemayor, she has been struggling to complete HD sessions over the last 6 weeks and has becoming more and more frail and tachycardic on the machine and there is some concern she will not be able to continue with dialysis. She asked to come to the ER today for worsening shortness of breath. She does not have BiPAP at home. Daughter has not observed any issues with swallowing or episodes concerning for aspiration. She is on  chronic opiates, unclear if patient has been more sedated than usual at home.      Overview/Hospital Course:  Pt offf from levophed and now midodrine is increased. Dialysis per nephro. Palliative consulted and now pt is a DNR.   10/17: 1.5L removed. Complains of cramps after.   10/18: pt was unresponsive on dialysis for 15 min with hypotension. Pt now alert and oriented. Will plan for family meeting tomorrow to discuss goals of care  10/19: Long discussion with goals of care. Pt and family still wants aggressive care. Would like to try dialysis without any benzos or opioids on board which could precipitate the hypotension leading to the unresponsive episode. U/a and cxr repeated to rule out infection per family friend's request  10/20: WBCS trended down to normal. U/a pending. CXR unchanged. Pt underwent HD today without benzos. After 90 minutes of HD, pt became unresponsive with barely palpable pulses ,not able to record BP, dialysis stopped.Rinsed back with BP improved and patient regained  consciousness. Unfortunately patient is not tolerating dialysis and will no longer be able to receive dialysis. To focus on comfort. Ancticipate D/c in am with hospice.   As of 10/23/2023, patient and family are asking about home dialysis. Case management sent referral to [facility]. She was seen by [facility], she is certainly appropriate candidate for hospice.       Interval History: Patient and family are asking about home dialysis. Case management sent referral to [facility]. She was seen by [facility], she is certainly appropriate candidate for hospice.     Review of Systems   Constitutional:  Positive for activity change and fatigue.   Neurological:  Positive for weakness.     Objective:     Vital Signs (Most Recent):  Temp: 98 °F (36.7 °C) (10/23/23 1221)  Pulse: (!) 116 (10/23/23 1231)  Resp: 18 (10/23/23 1221)  BP: 127/85 (10/23/23 1221)  SpO2: 98 % (10/23/23 1221) Vital Signs (24h Range):  Temp:   [96.2 °F (35.7 °C)-98 °F (36.7 °C)] 98 °F (36.7 °C)  Pulse:  [102-121] 116  Resp:  [16-22] 18  SpO2:  [94 %-99 %] 98 %  BP: (113-139)/(60-85) 127/85     Weight: 40.5 kg (89 lb 5.1 oz)  Body mass index is 16.34 kg/m².  No intake or output data in the 24 hours ending 10/23/23 1426      Physical Exam  Constitutional:       Appearance: She is ill-appearing.      Interventions: Nasal cannula in place.   HENT:      Head: Normocephalic and atraumatic.   Eyes:      General:         Right eye: No discharge.         Left eye: No discharge.   Cardiovascular:      Rate and Rhythm: Tachycardia present.      Heart sounds: No murmur heard.     No friction rub. No gallop.   Pulmonary:      Effort: No respiratory distress.      Breath sounds: No stridor. No wheezing, rhonchi or rales.   Chest:      Chest wall: No tenderness.   Abdominal:      General: There is no distension.      Palpations: There is no mass.      Tenderness: There is no abdominal tenderness. There is no left CVA tenderness, guarding or rebound.      Hernia: No hernia is present.   Skin:     Coloration: Skin is not jaundiced or pale.      Findings: No bruising, erythema, lesion or rash.   Neurological:      Cranial Nerves: No cranial nerve deficit.      Sensory: No sensory deficit.      Motor: No weakness.      Coordination: Coordination normal.      Gait: Gait normal.      Deep Tendon Reflexes: Reflexes normal.             Significant Labs: All pertinent labs within the past 24 hours have been reviewed.  CBC:   Recent Labs   Lab 10/22/23  1755   WBC 8.69   HGB 10.1*   HCT 31.0*        CMP:   Recent Labs   Lab 10/22/23  1755   *   K 4.2   CL 90*   CO2 22*   *   BUN 73*   CREATININE 8.7*   CALCIUM 8.9   PROT 7.1   ALBUMIN 3.3*   BILITOT 0.5   ALKPHOS 64   AST 16   ALT 27   ANIONGAP 22*       Significant Imaging:     Imaging Results              X-Ray Chest AP Portable (Final result)  Result time 10/12/23 13:11:03      Final result by Gadiel  Gavin SÁNCHEZ MD (10/12/23 13:11:03)                   Impression:      1. Grossly stable appearing cardiopulmonary findings noting pleural effusions and bilateral basilar subsegmental atelectasis.  Developing left lower lung zone consolidation not excluded.  Interstitial findings could reflect superimposed edema versus chronic change.  Correlation with any history of COPD/emphysema.      Electronically signed by: Gavin Ramesh MD  Date:    10/12/2023  Time:    13:11               Narrative:    EXAMINATION:  XR CHEST AP PORTABLE    CLINICAL HISTORY:  Shortness of breath    TECHNIQUE:  Single frontal view of the chest was performed.    COMPARISON:  10/08/2023    FINDINGS:  The cardiomediastinal silhouette is not enlarged noting calcification of the aorta.  Right central venous catheter tip projects over the distal SVC..  There is obscuration of the costophrenic angles suggesting effusions, left greater than right, similar to the previous exam..  The trachea is midline.  The lungs are symmetrically expanded bilaterally with coarse interstitial attenuation bilaterally.  There is bilateral basilar subsegmental atelectasis or scarring..  There is no pneumothorax.  The osseous structures are remarkable for degenerative change..                                         Assessment/Plan:      * Acute on chronic respiratory failure with hypoxia  Patient with Hypoxic Respiratory failure which is Acute on chronic.  she is not on home oxygen. Supplemental oxygen was provided and noted- Oxygen Concentration (%):  [25] 25    .   Signs/symptoms of respiratory failure include- respiratory distress. Contributing diagnoses includes - penumonia.  Labs and images were reviewed. Patient Has recent ABG, which has been reviewed. Will treat underlying causes and adjust management of respiratory failure as follows    Multiple admissions for PNA and SOB   CXR Grossly stable appearing cardiopulmonary findings noting pleural effusions and  bilateral basilar subsegmental atelectasis.  Developing left lower lung zone consolidation not excluded.    Started on BiPAP and weaned to 1 L NC  Procal 1.44, elevated from prior  Stop vancomycin  Pulm and Nephro feel this is more due to volume overload due to issues receiving dialysis and fluid removal, L lung findings are less likely recurrent pneumonia  Multiple thoracenteses in the past   ceftriaxone and azithromycin was stopped  Monitor without iv abx  Resp cx, BCx NGTD  Leukocytosis resolved      Frailty syndrome in geriatric patient  Nephrology recommend Hospice.        Hyperkalemia  Potassium stable      Goals of care, counseling/discussion  Advance Care Planning     Code Status  In light of the patients advanced and life limiting illness,I engaged the the patient in a voluntary conversation about the patient's preferences for care  at the very end of life. The patient wishes to have a natural, peaceful death.  Along those lines, the patient does not wish to have CPR or other invasive treatments performed when her heart and/or breathing stops. I communicated to the patient that a DNR form was completed and will be scanned into EPIC.      Pt not tolerating dialysis  Palliative on board  Anticipate hospice in am       Patient and family are asking about home dialysis. Case management sent referral to [facility]. She is appropriate candidate for hospice.     Community acquired pneumonia of left lower lobe of lung  S/p 5 days of treatment  Pneumonia ruled out  Leukocytosis most likely from steroids which was also stopped      ESRD (end stage renal disease) on dialysis   Nephrology on board  she has been struggling to complete HD sessions over the last 6 weeks and has becoming more and more frail and tachycardic on the machine and there is some concern she will not be able to continue with dialysis  Attempted dialysis again without any benzos to which pt became unresponsive  Palliative on  board  Anticipate d/c in am    10/23  Patient and family are asking about home dialysis. Case management sent referral to [facility]. She is certainly appropriate candidate for hospice.          VTE Risk Mitigation (From admission, onward)         Ordered     heparin (porcine) injection 5,000 Units  Every 12 hours         10/12/23 1406     IP VTE HIGH RISK PATIENT  Once         10/12/23 1406     Place sequential compression device  Until discontinued         10/12/23 1406     Place sequential compression device  Until discontinued         10/12/23 1406     heparin (porcine) injection 4,100 Units  As needed (PRN)         10/12/23 1406                Discharge Planning   LAITH:      Code Status: DNR   Is the patient medically ready for discharge?:     Reason for patient still in hospital (select all that apply): Patient trending condition and Treatment  Discharge Plan A: Other (TBD)   Discharge Delays: Orders Needed              Geraldo Murray NP  Department of [facility] Medicine   [facility]

## 2023-10-23 NOTE — SUBJECTIVE & OBJECTIVE
Interval History: Patient and family are asking about home dialysis. Case management sent referral to [facility]. She was seen by [facility], she is certainly appropriate candidate for hospice.     Review of Systems   Constitutional:  Positive for activity change and fatigue.   Neurological:  Positive for weakness.     Objective:     Vital Signs (Most Recent):  Temp: 98 °F (36.7 °C) (10/23/23 1221)  Pulse: (!) 116 (10/23/23 1231)  Resp: 18 (10/23/23 1221)  BP: 127/85 (10/23/23 1221)  SpO2: 98 % (10/23/23 1221) Vital Signs (24h Range):  Temp:  [96.2 °F (35.7 °C)-98 °F (36.7 °C)] 98 °F (36.7 °C)  Pulse:  [102-121] 116  Resp:  [16-22] 18  SpO2:  [94 %-99 %] 98 %  BP: (113-139)/(60-85) 127/85     Weight: 40.5 kg (89 lb 5.1 oz)  Body mass index is 16.34 kg/m².  No intake or output data in the 24 hours ending 10/23/23 1426      Physical Exam  Constitutional:       Appearance: She is ill-appearing.      Interventions: Nasal cannula in place.   HENT:      Head: Normocephalic and atraumatic.   Eyes:      General:         Right eye: No discharge.         Left eye: No discharge.   Cardiovascular:      Rate and Rhythm: Tachycardia present.      Heart sounds: No murmur heard.     No friction rub. No gallop.   Pulmonary:      Effort: No respiratory distress.      Breath sounds: No stridor. No wheezing, rhonchi or rales.   Chest:      Chest wall: No tenderness.   Abdominal:      General: There is no distension.      Palpations: There is no mass.      Tenderness: There is no abdominal tenderness. There is no left CVA tenderness, guarding or rebound.      Hernia: No hernia is present.   Skin:     Coloration: Skin is not jaundiced or pale.      Findings: No bruising, erythema, lesion or rash.   Neurological:      Cranial Nerves: No cranial nerve deficit.      Sensory: No sensory deficit.      Motor: No weakness.      Coordination: Coordination normal.      Gait: Gait normal.      Deep Tendon Reflexes: Reflexes normal.              Significant Labs: All pertinent labs within the past 24 hours have been reviewed.  CBC:   Recent Labs   Lab 10/22/23  1755   WBC 8.69   HGB 10.1*   HCT 31.0*        CMP:   Recent Labs   Lab 10/22/23  1755   *   K 4.2   CL 90*   CO2 22*   *   BUN 73*   CREATININE 8.7*   CALCIUM 8.9   PROT 7.1   ALBUMIN 3.3*   BILITOT 0.5   ALKPHOS 64   AST 16   ALT 27   ANIONGAP 22*       Significant Imaging:     Imaging Results              X-Ray Chest AP Portable (Final result)  Result time 10/12/23 13:11:03      Final result by Gavin Ramesh MD (10/12/23 13:11:03)                   Impression:      1. Grossly stable appearing cardiopulmonary findings noting pleural effusions and bilateral basilar subsegmental atelectasis.  Developing left lower lung zone consolidation not excluded.  Interstitial findings could reflect superimposed edema versus chronic change.  Correlation with any history of COPD/emphysema.      Electronically signed by: Gavin Ramesh MD  Date:    10/12/2023  Time:    13:11               Narrative:    EXAMINATION:  XR CHEST AP PORTABLE    CLINICAL HISTORY:  Shortness of breath    TECHNIQUE:  Single frontal view of the chest was performed.    COMPARISON:  10/08/2023    FINDINGS:  The cardiomediastinal silhouette is not enlarged noting calcification of the aorta.  Right central venous catheter tip projects over the distal SVC..  There is obscuration of the costophrenic angles suggesting effusions, left greater than right, similar to the previous exam..  The trachea is midline.  The lungs are symmetrically expanded bilaterally with coarse interstitial attenuation bilaterally.  There is bilateral basilar subsegmental atelectasis or scarring..  There is no pneumothorax.  The osseous structures are remarkable for degenerative change..

## 2023-10-23 NOTE — PT/OT/SLP PROGRESS
Physical Therapy Treatment    Patient Name:  Katie Quevedo   MRN:  [id number]    Recommendations:     Discharge Recommendations: Low Intensity Therapy  Discharge Equipment Recommendations: bedside commode  Barriers to discharge: None    Assessment:     Katie Quevedo is a 78 y.o. female admitted with a medical diagnosis of Acute on chronic respiratory failure with hypoxia.  She presents with the following impairments/functional limitations: weakness, impaired endurance, impaired self care skills, impaired functional mobility, gait instability, decreased lower extremity function, impaired cardiopulmonary response to activity ;pt with fair mobility today, limited endurance w/ standing act's/walking, O2@3L:89%; HR:118.    Rehab Prognosis: Fair; patient would benefit from acute skilled PT services to address these deficits and reach maximum level of function.    Recent Surgery: * No surgery found *      Plan:     During this hospitalization, patient to be seen 5 x/week to address the identified rehab impairments via gait training, therapeutic activities, therapeutic exercises, neuromuscular re-education and progress toward the following goals:    Plan of Care Expires:  11/13/23    Subjective     Chief Complaint: c/o back and neck pain (chronic)  Patient/Family Comments/goals: pt agreeable to session, family present as well.  Pain/Comfort:  Pain Rating 1:  (did not rate)  Location 1: back (and neck)  Pain Addressed 1: Reposition, Distraction      Objective:     Communicated with nurse prior to session.  Patient found up in chair with oxygen, peripheral IV, telemetry upon PT entry to room.     General Precautions: Standard, fall  Orthopedic Precautions: N/A  Braces: N/A  Respiratory Status: Nasal cannula, flow 3 L/min     Functional Mobility:  Transfers:     Sit to Stand:  contact guard assistance with rolling walker  Gait: pt stood multiple times from chair, perf'd marching act's w/RW ~45 sec x 3 trials, CGA, cueing for pursed  lip breathing.      AM-PAC 6 CLICK MOBILITY  Turning over in bed (including adjusting bedclothes, sheets and blankets)?: 3  Sitting down on and standing up from a chair with arms (e.g., wheelchair, bedside commode, etc.): 3  Moving from lying on back to sitting on the side of the bed?: 3  Moving to and from a bed to a chair (including a wheelchair)?: 3  Need to walk in hospital room?: 3  Climbing 3-5 steps with a railing?: 1  Basic Mobility Total Score: 16       Treatment & Education:  Pt perf'd seated LE ex's of hip flex, LAQ's , heelraises x 5 ea.   O2@3L:98% at rest, 89% w/ activity  HR:107-118    Patient left up in chair with all lines intact, call button in reach, nurse notified, and family present..    GOALS:   Multidisciplinary Problems       Physical Therapy Goals          Problem: Physical Therapy    Goal Priority Disciplines Outcome Goal Variances Interventions   Physical Therapy Goal     PT, PT/OT Ongoing, Progressing     Description: Goals to be met by: 23     Patient will increase functional independence with mobility by performin. Sit to stand transfer with Supervision  2. Bed to chair transfer with Supervision using RW or rollator  3. Gait  x 50 feet with Supervision using rollator or RW.   4. Lower extremity exercise program x10 reps per handout, with independence                         Time Tracking:     PT Received On: 10/23/23  PT Start Time: 1350     PT Stop Time: 1415  PT Total Time (min): 25 min     Billable Minutes: Therapeutic Activity 25    Treatment Type: Treatment  PT/PTA: PTA     Number of PTA visits since last PT visit: 2     10/23/2023

## 2023-10-23 NOTE — ASSESSMENT & PLAN NOTE
Advance Care Planning     Code Status  In light of the patients advanced and life limiting illness,I engaged the the patient in a voluntary conversation about the patient's preferences for care  at the very end of life. The patient wishes to have a natural, peaceful death.  Along those lines, the patient does not wish to have CPR or other invasive treatments performed when her heart and/or breathing stops. I communicated to the patient that a DNR form was completed and will be scanned into EPIC.      Pt not tolerating dialysis  Palliative on board  Anticipate hospice in am        Patient and family are asking about home dialysis. Case management sent referral to [facility]. She is appropriate candidate for hospice.

## 2023-10-23 NOTE — ASSESSMENT & PLAN NOTE
SOB-  etiology multifactorial - due to COPD/ pulmonary HTN and pulmonary edema- - remain stable with supp oxygen - NC and no resp distress or symptoms.

## 2023-10-24 LAB
ALBUMIN SERPL BCP-MCNC: 3 G/DL (ref 3.5–5.2)
ANION GAP SERPL CALC-SCNC: 27 MMOL/L (ref 8–16)
BUN SERPL-MCNC: 98 MG/DL (ref 8–23)
CALCIUM SERPL-MCNC: 9.1 MG/DL (ref 8.7–10.5)
CHLORIDE SERPL-SCNC: 89 MMOL/L (ref 95–110)
CO2 SERPL-SCNC: 14 MMOL/L (ref 23–29)
CREAT SERPL-MCNC: 10.9 MG/DL (ref 0.5–1.4)
EST. GFR  (NO RACE VARIABLE): 3 ML/MIN/1.73 M^2
GLUCOSE SERPL-MCNC: 97 MG/DL (ref 70–110)
PHOSPHATE SERPL-MCNC: 6.3 MG/DL (ref 2.7–4.5)
POTASSIUM SERPL-SCNC: 5.1 MMOL/L (ref 3.5–5.1)
SODIUM SERPL-SCNC: 130 MMOL/L (ref 136–145)

## 2023-10-24 PROCEDURE — 94640 AIRWAY INHALATION TREATMENT: CPT | Mod: HCNC

## 2023-10-24 PROCEDURE — 99900035 HC TECH TIME PER 15 MIN (STAT): Mod: HCNC

## 2023-10-24 PROCEDURE — 25000003 PHARM REV CODE 250: Mod: HCNC | Performed by: STUDENT IN AN ORGANIZED HEALTH CARE EDUCATION/TRAINING PROGRAM

## 2023-10-24 PROCEDURE — 36415 COLL VENOUS BLD VENIPUNCTURE: CPT | Mod: HCNC | Performed by: INTERNAL MEDICINE

## 2023-10-24 PROCEDURE — 97110 THERAPEUTIC EXERCISES: CPT | Mod: HCNC,CQ

## 2023-10-24 PROCEDURE — 25000242 PHARM REV CODE 250 ALT 637 W/ HCPCS: Mod: HCNC | Performed by: FAMILY MEDICINE

## 2023-10-24 PROCEDURE — 11000001 HC ACUTE MED/SURG PRIVATE ROOM: Mod: HCNC

## 2023-10-24 PROCEDURE — 94761 N-INVAS EAR/PLS OXIMETRY MLT: CPT | Mod: HCNC

## 2023-10-24 PROCEDURE — 97530 THERAPEUTIC ACTIVITIES: CPT | Mod: HCNC,CQ

## 2023-10-24 PROCEDURE — 80069 RENAL FUNCTION PANEL: CPT | Mod: HCNC | Performed by: INTERNAL MEDICINE

## 2023-10-24 PROCEDURE — 25000003 PHARM REV CODE 250: Mod: HCNC | Performed by: FAMILY MEDICINE

## 2023-10-24 PROCEDURE — 27000221 HC OXYGEN, UP TO 24 HOURS: Mod: HCNC

## 2023-10-24 PROCEDURE — 63600175 PHARM REV CODE 636 W HCPCS: Mod: HCNC | Performed by: FAMILY MEDICINE

## 2023-10-24 RX ADMIN — IPRATROPIUM BROMIDE AND ALBUTEROL SULFATE 3 ML: 2.5; .5 SOLUTION RESPIRATORY (INHALATION) at 03:10

## 2023-10-24 RX ADMIN — IPRATROPIUM BROMIDE AND ALBUTEROL SULFATE 3 ML: 2.5; .5 SOLUTION RESPIRATORY (INHALATION) at 08:10

## 2023-10-24 RX ADMIN — HYDROXYZINE HYDROCHLORIDE 25 MG: 25 TABLET ORAL at 09:10

## 2023-10-24 RX ADMIN — Medication 6 MG: at 08:10

## 2023-10-24 RX ADMIN — ACETAMINOPHEN 325MG 650 MG: 325 TABLET ORAL at 02:10

## 2023-10-24 RX ADMIN — HEPARIN SODIUM 5000 UNITS: 5000 INJECTION INTRAVENOUS; SUBCUTANEOUS at 08:10

## 2023-10-24 RX ADMIN — MIRTAZAPINE 7.5 MG: 7.5 TABLET ORAL at 08:10

## 2023-10-24 RX ADMIN — IPRATROPIUM BROMIDE AND ALBUTEROL SULFATE 3 ML: 2.5; .5 SOLUTION RESPIRATORY (INHALATION) at 12:10

## 2023-10-24 RX ADMIN — SEVELAMER CARBONATE 800 MG: 800 TABLET, FILM COATED ORAL at 12:10

## 2023-10-24 RX ADMIN — HEPARIN SODIUM 5000 UNITS: 5000 INJECTION INTRAVENOUS; SUBCUTANEOUS at 09:10

## 2023-10-24 RX ADMIN — NEPHROCAP 1 CAPSULE: 1 CAP ORAL at 09:10

## 2023-10-24 RX ADMIN — TRAMADOL HYDROCHLORIDE 50 MG: 50 TABLET, COATED ORAL at 08:10

## 2023-10-24 RX ADMIN — FLUTICASONE FUROATE AND VILANTEROL TRIFENATATE 1 PUFF: 200; 25 POWDER RESPIRATORY (INHALATION) at 08:10

## 2023-10-24 RX ADMIN — MIDODRINE HYDROCHLORIDE 15 MG: 5 TABLET ORAL at 12:10

## 2023-10-24 RX ADMIN — MEGESTROL ACETATE 20 MG: 20 TABLET ORAL at 08:10

## 2023-10-24 RX ADMIN — SEVELAMER CARBONATE 800 MG: 800 TABLET, FILM COATED ORAL at 04:10

## 2023-10-24 RX ADMIN — HYDROCODONE BITARTRATE AND ACETAMINOPHEN 1 TABLET: 10; 325 TABLET ORAL at 06:10

## 2023-10-24 RX ADMIN — MIDODRINE HYDROCHLORIDE 15 MG: 5 TABLET ORAL at 09:10

## 2023-10-24 RX ADMIN — ACETAMINOPHEN 325MG 650 MG: 325 TABLET ORAL at 08:10

## 2023-10-24 RX ADMIN — SEVELAMER CARBONATE 800 MG: 800 TABLET, FILM COATED ORAL at 09:10

## 2023-10-24 RX ADMIN — MIDODRINE HYDROCHLORIDE 15 MG: 5 TABLET ORAL at 04:10

## 2023-10-24 RX ADMIN — HYDROXYZINE HYDROCHLORIDE 25 MG: 25 TABLET ORAL at 08:10

## 2023-10-24 NOTE — PT/OT/SLP PROGRESS
Physical Therapy Treatment    Patient Name:  Katie Quevedo   MRN:  [id number]    Recommendations:     Discharge Recommendations: Low Intensity Therapy  Discharge Equipment Recommendations: bedside commode  Barriers to discharge:  needs assist at this time secondary to decreased strength and SOB with minimal exertion    Assessment:     Katie Quevedo is a 78 y.o. female admitted with a medical diagnosis of Acute on chronic respiratory failure with hypoxia.  She presents with the following impairments/functional limitations: weakness, impaired endurance, impaired self care skills, gait instability, impaired functional mobility, impaired balance, decreased lower extremity function, decreased upper extremity function, decreased coordination, impaired coordination, impaired cardiopulmonary response to activity Pt would continue to benefit from P.T. To address impairments listed above.  .    Rehab Prognosis: Fair; patient would benefit from acute skilled PT services to address these deficits and reach maximum level of function.    Recent Surgery: * No surgery found *      Plan:     During this hospitalization, patient to be seen 5 x/week to address the identified rehab impairments via gait training, therapeutic activities, therapeutic exercises, neuromuscular re-education and progress toward the following goals:    Plan of Care Expires:  11/13/23    Subjective       Patient/Family Comments/goals: Pt agreed to tx  Pain/Comfort:  Pain Rating 1:  (c/o on/off chest pain; did not rate)  Location 1: chest  Pain Addressed 1: Reposition, Distraction, Cessation of Activity, Nurse notified  Pain Rating Post-Intervention 1:  (as above)      Objective:     Communicated with RN prior to session.  Patient found up in chair with peripheral IV, telemetry, oxygen upon PT entry to room.     General Precautions: Standard, fall  Orthopedic Precautions: N/A  Braces: N/A  Respiratory Status: Nasal cannula, flow 3 L/min     Functional  Mobility:  Transfers:     Sit to Stand:  minimum assistance with rolling walker with vc's for hand placement  Balance: sitting good-, standing fair RW      AM-PAC 6 CLICK MOBILITY  Turning over in bed (including adjusting bedclothes, sheets and blankets)?: 3  Sitting down on and standing up from a chair with arms (e.g., wheelchair, bedside commode, etc.): 3  Moving from lying on back to sitting on the side of the bed?: 3  Moving to and from a bed to a chair (including a wheelchair)?: 3  Need to walk in hospital room?: 1  Climbing 3-5 steps with a railing?: 1  Basic Mobility Total Score: 14       Treatment & Education:  Pt found sitting up in b/s recliner on wall unit O2 @ 3lpm upon entering the room.  BLE therex in recliner: AP, heelslides, hip ABD/ADD 10 x 2 reps with CGA/Konrad and rest breaks between bouts secondary to SOB.  Pt was instructed in PLB and O2 sats were monitored - 93% to 96% and HR 94-98bpm throughout tx.  Seated LAQs 10 x 2 reps vc's to perform slowly with rest breaks between bouts secondary to SOB with exertion.  Standing with RW and CGA with one bout of Konrad secondary to mild posterior LOB.  Pt marched in place x 3 reps with minimal foot to floor clearance, but had to return to sitting secondary to c/o LE weakness and SOB.  Pt was instructed in PLB bouts with O2 sats 93%.  Pt was then instructed to scoot back into chair sitting upright with pillows behind her back for improved positioning and ease with breathing.  Pt was instructed in energy conservation techniques and self monitoring to know when to take a break and not over push herself.  Pt stated she is trying, but is used to moving fast in her life.     Patient left up in chair with all lines intact, call button in reach and all lines intac, RN notified, and pt's  present..    GOALS:   Multidisciplinary Problems       Physical Therapy Goals          Problem: Physical Therapy    Goal Priority Disciplines Outcome Goal Variances  Interventions   Physical Therapy Goal     PT, PT/OT Ongoing, Progressing     Description: Goals to be met by: 23     Patient will increase functional independence with mobility by performin. Sit to stand transfer with Supervision  2. Bed to chair transfer with Supervision using RW or rollator  3. Gait  x 50 feet with Supervision using rollator or RW.   4. Lower extremity exercise program x10 reps per handout, with independence                         Time Tracking:     PT Received On: 10/24/23  PT Start Time: 1540     PT Stop Time: 1604  PT Total Time (min): 24 min     Billable Minutes: Therapeutic Activity 12 and Therapeutic Exercise 12    Treatment Type: Treatment  PT/PTA: PTA     Number of PTA visits since last PT visit: 3     10/24/2023

## 2023-10-24 NOTE — PROGRESS NOTES
[facility] - Telemetry  Adult Nutrition  Progress Note    SUMMARY       Recommendations    Recommendation:  1. Continue to encourage intake of meals & supplement as tolerated.   2. Monitor weight/labs.   3. RD to continue to follow to monitor po intake    Goals:  Pt will tolerate diet with at least 50% intake at meals by RD follow up  Nutrition Goal Status: progressing towards goal  Communication of RD Recs: reviewed with RN    Assessment and Plan  Nutrition Problem  Altered Nutrition Related Lab Values     Related to (etiology):   Dx/hx     Signs and Symptoms (as evidenced by):   Na 127L, K 5.4H, BUN 55H, Crea 5.3H, Ca 8.0L, Alb 2.7L      Interventions:  Collaboration with other providers     Nutrition Diagnosis Status:   Continues    Malnutrition Assessment  Weight Loss (Malnutrition):  (4% x 5 months)   Upper Arm Region (Subcutaneous Fat Loss): mild depletion  Thoracic and Lumbar Region: mild depletion   Clavicle Bone Region (Muscle Loss): mild depletion  Patellar Region (Muscle Loss): mild depletion  Anterior Thigh Region (Muscle Loss): mild depletion  Posterior Calf Region (Muscle Loss): mild depletion       Reason for Assessment  Reason For Assessment: RD follow-up  Diagnosis:  (SOB)  Relevant Medical History: COPD, osteoporosis, HTN, bilateral renal cysts, PE, CKD, seziures, CHF, lumbar laminectomy, hysterectomy, thrombectomy  General Information Comments: Pt on Cardiac Renal 1000ml fluid diet with Novasource Renal. Noted fair intake at meals. Noted pt no longer appropriate for HD and possible transfer to hospice. Ernesto 19- skin intact. Noted 5lb weight loss x 5 months. Reports UBW 120lb x 2 years ago. NFPE completed 10/18-mild wasting of legs, clavicles, thoracic region & arms  Nutrition Discharge Planning: pt to d/c on Cardiac Renal diet    Nutrition Risk Screen  Nutrition Risk Screen: no indicators present    Nutrition/Diet History  Food Preferences: no Mormon or cultural food prefs identified  Spiritual,  "Cultural Beliefs, Restoration Practices, Values that Affect Care: no  Factors Affecting Nutritional Intake: None identified at this time    Anthropometrics  Temp: 97.8 °F (36.6 °C)  Height Method: Stated  Height: 5' 2" (157.5 cm)  Height (inches): 62 in  Weight Method: Bed Scale  Weight: 40.5 kg (89 lb 4.6 oz)  Weight (lb): 89.29 lb  Ideal Body Weight (IBW), Female: 110 lb  % Ideal Body Weight, Female (lb): 81.17 %  BMI (Calculated): 16.3  BMI Grade: 16 - 16.9 protein-energy malnutrition grade II  Usual Body Weight (UBW), k.4 kg ()  % Usual Body Weight: 95.72  % Weight Change From Usual Weight: -4.48 %     Lab/Procedures/Meds  Pertinent Labs Reviewed: reviewed  Pertinent Labs Comments: Na 134L, BUN 73H, Crea 8.7H, Glu 122H, Phos 5.3H, Alb 3.3L  Pertinent Medications Reviewed: reviewed  Pertinent Medications Comments: heparin, Megace, renal vitamin    Estimated/Assessed Needs  Weight Used For Calorie Calculations: 40.5 kg (89 lb 4.6 oz)  Energy Calorie Requirements (kcal): 1417 (35 kcal/kg)  Energy Need Method: Kcal/kg  Protein Requirements: 48g (1.2g/kg)  Weight Used For Protein Calculations: 40.5 kg (89 lb 4.6 oz)  Estimated Fluid Requirement Method: RDA Method  RDA Method (mL): 1417     Nutrition Prescription Ordered  Current Diet Order: Cardiac Renal 1000ml fluid  Oral Nutrition Supplement: Novasource Renal    Evaluation of Received Nutrient/Fluid Intake  I/O: 930/0  Energy Calories Required: not meeting needs  Protein Required: not meeting needs  Fluid Required: not meeting needs  Comments: LBM 10/22  % Intake of Estimated Energy Needs: 50 - 75 %  % Meal Intake: 50 - 75 %    Nutrition Risk  Level of Risk/Frequency of Follow-up:  (2xweekly)     Monitor and Evaluation  Food and Nutrient Intake: food and beverage intake  Food and Nutrient Adminstration: diet order  Physical Activity and Function: nutrition-related ADLs and IADLs  Anthropometric Measurements: weight  Biochemical Data, Medical Tests and " Procedures: electrolyte and renal panel  Nutrition-Focused Physical Findings: overall appearance     Nutrition Follow-Up  RD Follow-up?: Yes

## 2023-10-24 NOTE — PLAN OF CARE
Renal    Pt stable per nurse report- no new labs this am.  Hospice eval on hold per family request.  Pt not eating/ drinking much and still has neg fluid balance.    Multidisciplinary team meeting scheduled for tomorrow @ 8:30 am.    Will confirm again pt's wishes for no further dialysis with her understanding  her risks/ benefits of RRT.

## 2023-10-24 NOTE — NURSING
Palliative care team notified that patient's family does not want them to round with nephrology team in am for 0830 meeting. Family prefers to discuss all treatment options with medical and nephrology team before discussing any options with palliative.

## 2023-10-24 NOTE — PLAN OF CARE
Recommendation:  1. Continue to encourage intake of meals & supplement as tolerated.   2. Monitor weight/labs.   3. RD to continue to follow to monitor po intake    Goals:  Pt will tolerate diet with at least 50% intake at meals by RD follow up  Nutrition Goal Status: progressing towards goal   How Severe Is This Condition?: moderate

## 2023-10-24 NOTE — NURSING
Darrian notified that patient wishes to not have Darrian present in room or apart of her case at this time. Team notified and verbalized understanding.

## 2023-10-24 NOTE — PLAN OF CARE
10/24/23 1028   Post-Acute Status   Post-Acute Authorization Hospice   Hospice Status Pending post acute provider review/more information requested  (Spoke with Carmen with [facility]. She will call and check status of the referral for Hospice.)     1159 am - [facility] rep met with pt and daughter at the bedside. Questions were answered. No consents were signed at the moment. Family would like to speak with [facility] before making decisions but they have [facility] rep contact info. Pt has oxygen at home already.

## 2023-10-24 NOTE — NURSING
Pt sitting up in bedside chair. She is visually SOB at this time. MD team notified. Pt family is also expressing concern for patient having a trial run of HD at the prescription of nephro on the case. Pt states today that she really would like to try HD one more time but only if daughter can be at bedside with the patient during treatment. Oxygen therapy in maintained and O2 remains above 91%. Pt denies any acute pain. Safety precautions maintained. Family notified of POC for all medical disciplines to meet with them on 10/25 at 0830.

## 2023-10-24 NOTE — PLAN OF CARE
Patient on oxygen with documented flow. Neb tx given. No respiratory distress noted. Will continue to monitor.

## 2023-10-25 LAB
ALBUMIN SERPL BCP-MCNC: 2.9 G/DL (ref 3.5–5.2)
ANION GAP SERPL CALC-SCNC: 21 MMOL/L (ref 8–16)
BASOPHILS # BLD AUTO: 0.04 K/UL (ref 0–0.2)
BASOPHILS NFR BLD: 0.5 % (ref 0–1.9)
BUN SERPL-MCNC: 103 MG/DL (ref 8–23)
CALCIUM SERPL-MCNC: 9.2 MG/DL (ref 8.7–10.5)
CHLORIDE SERPL-SCNC: 91 MMOL/L (ref 95–110)
CO2 SERPL-SCNC: 18 MMOL/L (ref 23–29)
CREAT SERPL-MCNC: 11.2 MG/DL (ref 0.5–1.4)
DIFFERENTIAL METHOD: ABNORMAL
EOSINOPHIL # BLD AUTO: 0.7 K/UL (ref 0–0.5)
EOSINOPHIL NFR BLD: 8.5 % (ref 0–8)
ERYTHROCYTE [DISTWIDTH] IN BLOOD BY AUTOMATED COUNT: 17.6 % (ref 11.5–14.5)
EST. GFR  (NO RACE VARIABLE): 3 ML/MIN/1.73 M^2
GLUCOSE SERPL-MCNC: 100 MG/DL (ref 70–110)
HCT VFR BLD AUTO: 29 % (ref 37–48.5)
HGB BLD-MCNC: 9.2 G/DL (ref 12–16)
IMM GRANULOCYTES # BLD AUTO: 0.03 K/UL (ref 0–0.04)
IMM GRANULOCYTES NFR BLD AUTO: 0.4 % (ref 0–0.5)
LYMPHOCYTES # BLD AUTO: 0.7 K/UL (ref 1–4.8)
LYMPHOCYTES NFR BLD: 8 % (ref 18–48)
MCH RBC QN AUTO: 33.1 PG (ref 27–31)
MCHC RBC AUTO-ENTMCNC: 31.7 G/DL (ref 32–36)
MCV RBC AUTO: 104 FL (ref 82–98)
MONOCYTES # BLD AUTO: 0.8 K/UL (ref 0.3–1)
MONOCYTES NFR BLD: 10 % (ref 4–15)
NEUTROPHILS # BLD AUTO: 6 K/UL (ref 1.8–7.7)
NEUTROPHILS NFR BLD: 72.6 % (ref 38–73)
NRBC BLD-RTO: 0 /100 WBC
PHOSPHATE SERPL-MCNC: 6.9 MG/DL (ref 2.7–4.5)
PLATELET # BLD AUTO: 189 K/UL (ref 150–450)
PMV BLD AUTO: 10.1 FL (ref 9.2–12.9)
POTASSIUM SERPL-SCNC: 4.8 MMOL/L (ref 3.5–5.1)
RBC # BLD AUTO: 2.78 M/UL (ref 4–5.4)
SODIUM SERPL-SCNC: 130 MMOL/L (ref 136–145)
WBC # BLD AUTO: 8.21 K/UL (ref 3.9–12.7)

## 2023-10-25 PROCEDURE — 25000003 PHARM REV CODE 250: Mod: HCNC | Performed by: INTERNAL MEDICINE

## 2023-10-25 PROCEDURE — 63600175 PHARM REV CODE 636 W HCPCS: Mod: HCNC | Performed by: NURSE PRACTITIONER

## 2023-10-25 PROCEDURE — 63600175 PHARM REV CODE 636 W HCPCS: Mod: JZ,JG,HCNC | Performed by: FAMILY MEDICINE

## 2023-10-25 PROCEDURE — 85025 COMPLETE CBC W/AUTO DIFF WBC: CPT | Mod: HCNC | Performed by: INTERNAL MEDICINE

## 2023-10-25 PROCEDURE — 94799 UNLISTED PULMONARY SVC/PX: CPT | Mod: HCNC

## 2023-10-25 PROCEDURE — 25000003 PHARM REV CODE 250: Mod: HCNC | Performed by: FAMILY MEDICINE

## 2023-10-25 PROCEDURE — 99900035 HC TECH TIME PER 15 MIN (STAT): Mod: HCNC

## 2023-10-25 PROCEDURE — 97110 THERAPEUTIC EXERCISES: CPT | Mod: HCNC

## 2023-10-25 PROCEDURE — 27000221 HC OXYGEN, UP TO 24 HOURS: Mod: HCNC

## 2023-10-25 PROCEDURE — 97530 THERAPEUTIC ACTIVITIES: CPT | Mod: HCNC

## 2023-10-25 PROCEDURE — 80069 RENAL FUNCTION PANEL: CPT | Mod: HCNC | Performed by: INTERNAL MEDICINE

## 2023-10-25 PROCEDURE — 27100171 HC OXYGEN HIGH FLOW UP TO 24 HOURS: Mod: HCNC

## 2023-10-25 PROCEDURE — 36415 COLL VENOUS BLD VENIPUNCTURE: CPT | Mod: HCNC | Performed by: INTERNAL MEDICINE

## 2023-10-25 PROCEDURE — 25000242 PHARM REV CODE 250 ALT 637 W/ HCPCS: Mod: HCNC | Performed by: FAMILY MEDICINE

## 2023-10-25 PROCEDURE — P9047 ALBUMIN (HUMAN), 25%, 50ML: HCPCS | Mod: JZ,JG,HCNC | Performed by: FAMILY MEDICINE

## 2023-10-25 PROCEDURE — 25000003 PHARM REV CODE 250: Mod: HCNC | Performed by: STUDENT IN AN ORGANIZED HEALTH CARE EDUCATION/TRAINING PROGRAM

## 2023-10-25 PROCEDURE — 80100016 HC MAINTENANCE HEMODIALYSIS: Mod: HCNC

## 2023-10-25 PROCEDURE — 94761 N-INVAS EAR/PLS OXIMETRY MLT: CPT | Mod: HCNC

## 2023-10-25 PROCEDURE — 11000001 HC ACUTE MED/SURG PRIVATE ROOM: Mod: HCNC

## 2023-10-25 PROCEDURE — 94640 AIRWAY INHALATION TREATMENT: CPT | Mod: HCNC

## 2023-10-25 PROCEDURE — 94660 CPAP INITIATION&MGMT: CPT | Mod: HCNC

## 2023-10-25 PROCEDURE — 63600175 PHARM REV CODE 636 W HCPCS: Mod: JZ,HCNC | Performed by: FAMILY MEDICINE

## 2023-10-25 RX ORDER — MORPHINE SULFATE 2 MG/ML
2 INJECTION, SOLUTION INTRAMUSCULAR; INTRAVENOUS ONCE
Status: COMPLETED | OUTPATIENT
Start: 2023-10-25 | End: 2023-10-25

## 2023-10-25 RX ORDER — LORAZEPAM 1 MG/1
1 TABLET ORAL ONCE
Status: COMPLETED | OUTPATIENT
Start: 2023-10-25 | End: 2023-10-25

## 2023-10-25 RX ORDER — SODIUM CHLORIDE 9 MG/ML
INJECTION, SOLUTION INTRAVENOUS ONCE
Status: COMPLETED | OUTPATIENT
Start: 2023-10-25 | End: 2023-10-25

## 2023-10-25 RX ADMIN — IPRATROPIUM BROMIDE AND ALBUTEROL SULFATE 3 ML: 2.5; .5 SOLUTION RESPIRATORY (INHALATION) at 07:10

## 2023-10-25 RX ADMIN — MEGESTROL ACETATE 20 MG: 20 TABLET ORAL at 09:10

## 2023-10-25 RX ADMIN — HYDROCODONE BITARTRATE AND ACETAMINOPHEN 1 TABLET: 10; 325 TABLET ORAL at 12:10

## 2023-10-25 RX ADMIN — IPRATROPIUM BROMIDE AND ALBUTEROL SULFATE 3 ML: 2.5; .5 SOLUTION RESPIRATORY (INHALATION) at 12:10

## 2023-10-25 RX ADMIN — IPRATROPIUM BROMIDE AND ALBUTEROL SULFATE 3 ML: 2.5; .5 SOLUTION RESPIRATORY (INHALATION) at 04:10

## 2023-10-25 RX ADMIN — MORPHINE SULFATE 2 MG: 2 INJECTION, SOLUTION INTRAMUSCULAR; INTRAVENOUS at 08:10

## 2023-10-25 RX ADMIN — SEVELAMER CARBONATE 800 MG: 800 TABLET, FILM COATED ORAL at 11:10

## 2023-10-25 RX ADMIN — HEPARIN SODIUM 5000 UNITS: 5000 INJECTION INTRAVENOUS; SUBCUTANEOUS at 09:10

## 2023-10-25 RX ADMIN — MEGESTROL ACETATE 20 MG: 20 TABLET ORAL at 08:10

## 2023-10-25 RX ADMIN — ALBUMIN (HUMAN) 25 G: 12.5 SOLUTION INTRAVENOUS at 04:10

## 2023-10-25 RX ADMIN — LORAZEPAM 1 MG: 1 TABLET ORAL at 05:10

## 2023-10-25 RX ADMIN — IPRATROPIUM BROMIDE AND ALBUTEROL SULFATE 3 ML: 2.5; .5 SOLUTION RESPIRATORY (INHALATION) at 11:10

## 2023-10-25 RX ADMIN — MIRTAZAPINE 7.5 MG: 7.5 TABLET ORAL at 08:10

## 2023-10-25 RX ADMIN — ACETAMINOPHEN 325MG 650 MG: 325 TABLET ORAL at 06:10

## 2023-10-25 RX ADMIN — SODIUM CHLORIDE: 0.9 INJECTION, SOLUTION INTRAVENOUS at 04:10

## 2023-10-25 RX ADMIN — EPOETIN ALFA-EPBX 10000 UNITS: 10000 INJECTION, SOLUTION INTRAVENOUS; SUBCUTANEOUS at 11:10

## 2023-10-25 RX ADMIN — BUSPIRONE HYDROCHLORIDE 10 MG: 5 TABLET ORAL at 09:10

## 2023-10-25 RX ADMIN — FLUTICASONE FUROATE AND VILANTEROL TRIFENATATE 1 PUFF: 200; 25 POWDER RESPIRATORY (INHALATION) at 07:10

## 2023-10-25 RX ADMIN — MIDODRINE HYDROCHLORIDE 15 MG: 5 TABLET ORAL at 10:10

## 2023-10-25 RX ADMIN — MIDODRINE HYDROCHLORIDE 15 MG: 5 TABLET ORAL at 07:10

## 2023-10-25 RX ADMIN — HEPARIN SODIUM 4100 UNITS: 1000 INJECTION, SOLUTION INTRAVENOUS; SUBCUTANEOUS at 04:10

## 2023-10-25 RX ADMIN — NEPHROCAP 1 CAPSULE: 1 CAP ORAL at 09:10

## 2023-10-25 RX ADMIN — SEVELAMER CARBONATE 800 MG: 800 TABLET, FILM COATED ORAL at 07:10

## 2023-10-25 RX ADMIN — MIDODRINE HYDROCHLORIDE 15 MG: 5 TABLET ORAL at 11:10

## 2023-10-25 RX ADMIN — HYDROXYZINE HYDROCHLORIDE 25 MG: 25 TABLET ORAL at 08:10

## 2023-10-25 RX ADMIN — HEPARIN SODIUM 5000 UNITS: 5000 INJECTION INTRAVENOUS; SUBCUTANEOUS at 08:10

## 2023-10-25 RX ADMIN — ACETAMINOPHEN 325MG 650 MG: 325 TABLET ORAL at 11:10

## 2023-10-25 NOTE — PT/OT/SLP PROGRESS
Physical Therapy Treatment    Patient Name:  Katie Quevedo   MRN:  [id number]    Recommendations:     Discharge Recommendations: Low Intensity Therapy  Discharge Equipment Recommendations: bedside commode  Barriers to discharge: None    Assessment:     Katie Quevedo is a 78 y.o. female admitted with a medical diagnosis of Acute on chronic respiratory failure with hypoxia.  She presents with the following impairments/functional limitations: weakness, gait instability, impaired balance, impaired endurance, impaired self care skills, impaired functional mobility, decreased lower extremity function, decreased upper extremity function, impaired cardiopulmonary response to activity .    Rehab Prognosis: Fair; patient would benefit from acute skilled PT services to address these deficits and reach maximum level of function.    Recent Surgery: * No surgery found *      Plan:     During this hospitalization, patient to be seen 3 x/week to address the identified rehab impairments via gait training, therapeutic activities, therapeutic exercises, neuromuscular re-education and progress toward the following goals:    Plan of Care Expires:  11/13/23    Subjective     Chief Complaint: SOB  Patient/Family Comments/goals: pt is pleasant and agreeable to therapy  Pain/Comfort:  Pain Rating 1: 0/10  Pain Rating Post-Intervention 1: 0/10      Objective:     Communicated with nsg prior to session.  Patient found up in chair with peripheral IV, telemetry, oxygen upon PT entry to room.     General Precautions: Standard, fall  Orthopedic Precautions: N/A  Braces: N/A  Respiratory Status: Nasal cannula     Functional Mobility:  Bed Mobility:     Scooting: stand by assistance  Sit to Supine: minimum assistance  Transfers:     Sit to Stand:  minimum assistance with hand-held assist  Gait: Pt ambulated ~6 ft to chair and took ~2 side steps L toward HOB with Konrad/HHA; decreased speed/charito and increased caution noted      AM-PAC 6 CLICK  MOBILITY  Turning over in bed (including adjusting bedclothes, sheets and blankets)?: 3  Sitting down on and standing up from a chair with arms (e.g., wheelchair, bedside commode, etc.): 3  Moving from lying on back to sitting on the side of the bed?: 3  Moving to and from a bed to a chair (including a wheelchair)?: 3  Need to walk in hospital room?: 3  Climbing 3-5 steps with a railing?: 1  Basic Mobility Total Score: 16       Treatment & Education:  Pt up in chair upon arrival  Performed X 10 reps with yellow theraband: BUE horizontal abduction/adduction with palms up then palms down and hip abduction/adduction (clamshells) seated in 90/90 position  Rest breaks and PLB required in between sets due to increased RR/WOB/SOB  Pt ambulated as reported above chair>bed  Pt returned seated due to impaired endurance and performed 2-3 lateral seated scoots to HOB with CGA-Konrad then returned supine   BUE propped on pillows  PLB performed and pt instructed to relax BUE and shoulders due to increased muscle tension noted  RT notified and to perform breathing treatment post therapy session    Patient left HOB elevated with all lines intact, call button in reach, bed alarm on, nsg notified, and PCT/family present..    GOALS:   Multidisciplinary Problems       Physical Therapy Goals          Problem: Physical Therapy    Goal Priority Disciplines Outcome Goal Variances Interventions   Physical Therapy Goal     PT, PT/OT Ongoing, Progressing     Description: Goals to be met by: 23     Patient will increase functional independence with mobility by performin. Sit to stand transfer with Supervision  2. Bed to chair transfer with Supervision using RW or rollator  3. Gait  x 50 feet with Supervision using rollator or RW.   4. Lower extremity exercise program x10 reps per handout, with independence                         Time Tracking:     PT Received On: 10/25/23  PT Start Time: 1147     PT Stop Time: 1207  PT Total Time  (min): 20 min     Billable Minutes: Therapeutic Activity 10 and Therapeutic Exercise 10    Treatment Type: Treatment  PT/PTA: PT     Number of PTA visits since last PT visit: 0     10/25/2023

## 2023-10-25 NOTE — PLAN OF CARE
Problem: Adult Inpatient Plan of Care  Goal: Plan of Care Review  Outcome: Ongoing, Progressing     Problem: Bleeding (Sepsis/Septic Shock)  Goal: Absence of Bleeding  Outcome: Ongoing, Progressing     Problem: Glycemic Control Impaired (Sepsis/Septic Shock)  Goal: Blood Glucose Level Within Desired Range  Outcome: Ongoing, Progressing     Problem: Skin Injury Risk Increased  Goal: Skin Health and Integrity  Outcome: Ongoing, Progressing     Problem: Fall Injury Risk  Goal: Absence of Fall and Fall-Related Injury  Outcome: Ongoing, Progressing   Pt remained free from falls and injury. VSS and oxygen therapy maintained throughout day. Pain controlled by oral analgesics throughout day. Safety precautions maintained and family remained at bedside throughout shift. Frequent rounding completed and POC reviewed, pt and family verbalized understanding.

## 2023-10-25 NOTE — PROGRESS NOTES
[facility] Medicine  Progress Note    Patient Name: Katie Quevedo  MRN: [id number]  Patient Class: IP- Inpatient   Admission Date: 10/12/2023  Length of Stay: 12 days  Attending Physician: Franchesca Tillman MD  Primary Care Provider: Kingston Verduzco MD        Subjective:     Principal Problem:Acute on chronic respiratory failure with hypoxia        HPI:  Katie Quevedo is a 78 y.o.  female who  has a past medical history of Acute congestive heart failure (02/10/2020), Anemia, Bilateral renal cysts, Cataract, Chronic LBP (7/26/2012), Chronic pain, CKD (chronic kidney disease), stage IV, Colon polyp (2013), COPD (chronic obstructive pulmonary disease), Dehydration, Encounter for blood transfusion, HTN (hypertension), Lumbar spondylosis, Melanoma, Metabolic bone disease, Migraines, neuralgic, Osteoporosis, Primary osteoarthritis of both knees, Pulmonary embolism with infarction, Seizures (1972), Subdeltoid bursitis, L>R. (9/27/2012), Ulcer, and Vitamin D deficiency disease.. The patient presented to [facility] on 10/12/2023 with a primary complaint of Shortness of Breath (Pt presents to the ED for sob. Pt presents on a neb mask at 8lpm .  Pt has been hospitalized recently for pneumonia)     The patient was in their usual state of health until 2 days ago when she was discharge from [facility] for respiratory failure 2/2 pneumonia, COPD. She has had 2 other hospital admissions in the last 2 weeks. She was barely able to finish her HD session yesterday. Spoke with Dr. Montemayor, she has been struggling to complete HD sessions over the last 6 weeks and has becoming more and more frail and tachycardic on the machine and there is some concern she will not be able to continue with dialysis. She asked to come to the ER today for worsening shortness of breath. She does not have BiPAP at home. Daughter has not observed any issues with swallowing or episodes concerning for aspiration. She is on  chronic opiates, unclear if patient has been more sedated than usual at home.      Overview/Hospital Course:  Pt offf from levophed and now midodrine is increased. Dialysis per nephro. Palliative consulted and now pt is a DNR.   10/17: 1.5L removed. Complains of cramps after.   10/18: pt was unresponsive on dialysis for 15 min with hypotension. Pt now alert and oriented. Will plan for family meeting tomorrow to discuss goals of care  10/19: Long discussion with goals of care. Pt and family still wants aggressive care. Would like to try dialysis without any benzos or opioids on board which could precipitate the hypotension leading to the unresponsive episode. U/a and cxr repeated to rule out infection per family friend's request  10/20: WBCS trended down to normal. U/a pending. CXR unchanged. Pt underwent HD today without benzos. After 90 minutes of HD, pt became unresponsive with barely palpable pulses ,not able to record BP, dialysis stopped.Rinsed back with BP improved and patient regained  consciousness. Unfortunately patient is not tolerating dialysis and will no longer be able to receive dialysis. To focus on comfort. Ancticipate D/c in am with hospice.   As of 10/23/2023, patient and family are asking about home dialysis. Case management sent referral to [place] [facility]. She was seen by \[facility]\"" Nephrology, she is certainly appropriate candidate for hospice.   10/24 Rechecking labs, K stable. Plan for meeting in the am      Interval History: Rounded on patient with CM, Palliative attending, patient stated that she feels she's being pressed by the teams. Would prefer to discuss further in group meeting in am.    Requested labs. Renal function    Review of Systems   Respiratory:  Positive for shortness of breath.    Gastrointestinal:  Negative for diarrhea and nausea.   Neurological:  Positive for weakness.     Objective:     Vital Signs (Most Recent):  Temp: 98 °F (36.7 °C) (10/24/23 1604)  Pulse: 99 (10/24/23  "1604)  Resp: 20 (10/24/23 1815)  BP: 127/74 (10/24/23 1604)  SpO2: 95 % (10/24/23 1604) Vital Signs (24h Range):  Temp:  [97.5 °F (36.4 °C)-98.2 °F (36.8 °C)] 98 °F (36.7 °C)  Pulse:  [] 99  Resp:  [18-22] 20  SpO2:  [92 %-96 %] 95 %  BP: (102-127)/(58-74) 127/74     Weight: 40.5 kg (89 lb 4.6 oz)  Body mass index is 16.33 kg/m².    Intake/Output Summary (Last 24 hours) at 10/24/2023 1937  Last data filed at 10/24/2023 0624  Gross per 24 hour   Intake 180 ml   Output --   Net 180 ml         Physical Exam  Constitutional:       Appearance: She is ill-appearing.      Interventions: Nasal cannula in place.   HENT:      Head: Normocephalic and atraumatic.   Eyes:      General:         Right eye: No discharge.         Left eye: No discharge.   Cardiovascular:      Rate and Rhythm: Tachycardia present.      Heart sounds: No murmur heard.     No friction rub. No gallop.   Pulmonary:      Effort: Retractions present. No respiratory distress.      Breath sounds: No stridor. No wheezing, rhonchi or rales.   Chest:      Chest wall: No tenderness.   Abdominal:      General: There is no distension.      Palpations: There is no mass.      Tenderness: There is no abdominal tenderness. There is no left CVA tenderness, guarding or rebound.      Hernia: No hernia is present.   Skin:     Coloration: Skin is not jaundiced or pale.      Findings: No bruising, erythema, lesion or rash.   Neurological:      Cranial Nerves: No cranial nerve deficit.      Sensory: No sensory deficit.      Motor: No weakness.      Coordination: Coordination normal.      Gait: Gait normal.      Deep Tendon Reflexes: Reflexes normal.             Significant Labs: All pertinent labs within the past 24 hours have been reviewed.  CBC: No results for input(s): "WBC", "HGB", "HCT", "PLT" in the last 48 hours.  CMP:   Recent Labs   Lab 10/24/23  1608   *   K 5.1   CL 89*   CO2 14*   GLU 97   BUN 98*   CREATININE 10.9*   CALCIUM 9.1   ALBUMIN 3.0* "   ANIONGAP 27*       Significant Imaging: I have reviewed all pertinent imaging results/findings within the past 24 hours.      Assessment/Plan:      * Acute on chronic respiratory failure with hypoxia  Patient with Hypoxic Respiratory failure which is Acute on chronic.  she is not on home oxygen. Supplemental oxygen was provided and noted-      .   Signs/symptoms of respiratory failure include- respiratory distress. Contributing diagnoses includes - penumonia.  Labs and images were reviewed. Patient Has recent ABG, which has been reviewed. Will treat underlying causes and adjust management of respiratory failure as follows    Multiple admissions for PNA and SOB   CXR Grossly stable appearing cardiopulmonary findings noting pleural effusions and bilateral basilar subsegmental atelectasis.  Developing left lower lung zone consolidation not excluded.    Started on BiPAP and weaned to 1 L NC  Procal 1.44, elevated from prior  Stop vancomycin  Pulm and Nephro feel this is more due to volume overload due to issues receiving dialysis and fluid removal, L lung findings are less likely recurrent pneumonia  Multiple thoracenteses in the past   ceftriaxone and azithromycin was stopped  Monitor without iv abx  Resp cx, BCx NGTD  Leukocytosis resolved      Frailty syndrome in geriatric patient  Group meeting in am  Will discuss trying HD one more time in the ICU vs hospice      Hyperkalemia  Potassium stable      Goals of care, counseling/discussion  Advance Care Planning     Code Status  In light of the patients advanced and life limiting illness,I engaged the the patient in a voluntary conversation about the patient's preferences for care  at the very end of life. The patient wishes to have a natural, peaceful death.  Along those lines, the patient does not wish to have CPR or other invasive treatments performed when her heart and/or breathing stops. I communicated to the patient that a DNR form was completed and will be  scanned into EPIC.      Pt not tolerating dialysis  Palliative on board  Anticipate hospice in am       Patient and family are asking about home dialysis. Case management sent referral to [facility]. She is appropriate candidate for hospice.     Community acquired pneumonia of left lower lobe of lung  S/p 5 days of treatment  Pneumonia ruled out  Leukocytosis most likely from steroids which was also stopped      Chronic obstructive pulmonary disease with acute exacerbation        ESRD (end stage renal disease) on dialysis   Nephrology on board  she has been struggling to complete HD sessions over the last 6 weeks and has becoming more and more frail and tachycardic on the machine and there is some concern she will not be able to continue with dialysis  Attempted dialysis again without any benzos to which pt became unresponsive  Palliative on board  Anticipate d/c in am    10/23  Patient and family are asking about home dialysis. Case management sent referral to [facility]. She is certainly appropriate candidate for hospice.          VTE Risk Mitigation (From admission, onward)         Ordered     heparin (porcine) injection 5,000 Units  Every 12 hours         10/12/23 1406     IP VTE HIGH RISK PATIENT  Once         10/12/23 1406     Place sequential compression device  Until discontinued         10/12/23 1406     Place sequential compression device  Until discontinued         10/12/23 1406     heparin (porcine) injection 4,100 Units  As needed (PRN)         10/12/23 1406                Discharge Planning   LAITH:      Code Status: DNR   Is the patient medically ready for discharge?:     Reason for patient still in hospital (select all that apply): Patient trending condition and Consult recommendations  Discharge Plan A: Other (TBD)   Discharge Delays: Orders Needed              Franchesca Tillman MD  Department of [facility] Medicine   [facility]

## 2023-10-25 NOTE — PROGRESS NOTES
[facility] - Telemetry  Nephrology  Progress Note    Patient Name: Katie Quevedo  MRN: [id number]  Admission Date: 10/12/2023  Hospital Length of Stay: 13 days  Attending Provider: Franchesca Tillman MD   Primary Care Physician: Kingston Verduzco MD  Principal Problem:Acute on chronic respiratory failure with hypoxia    Subjective:     HPI: No notes on file    Interval History:   Pt sitting up in chair @ BS- with family present-daughter and - and had discussion regarding dialysis.  Pt c/o SOB  this am and states she does want to try HD today, and understands her risks of treatment. Denies chest pain or edema.      Review of patient's allergies indicates:   Allergen Reactions    Aspirin      Other reaction(s): hx of ulcers    Tetracycline Swelling     Other reaction(s): Swelling    Penicillins Rash     Other reaction(s): Hives  Other reaction(s): Rash  Other reaction(s): Rash  Other reaction(s): Hives     Current Facility-Administered Medications   Medication Frequency    0.9%  NaCl infusion PRN    0.9%  NaCl infusion PRN    0.9%  NaCl infusion Once    acetaminophen tablet 650 mg Q4H PRN    albumin human 25% bottle 25 g PRN    albuterol sulfate nebulizer solution 2.5 mg Q4H PRN    albuterol-ipratropium 2.5 mg-0.5 mg/3 mL nebulizer solution 3 mL Q4H    bisacodyL EC tablet 5 mg Daily PRN    busPIRone tablet 10 mg Once per day on Mon Wed Fri    carbamide peroxide 6.5 % otic solution 5 drop PRN    epoetin hemant-epbx injection 10,000 Units Every Mon, Wed, Fri    fluticasone furoate-vilanteroL 200-25 mcg/dose diskus inhaler 1 puff Daily    heparin (porcine) injection 4,100 Units PRN    heparin (porcine) injection 5,000 Units Q12H    HYDROcodone-acetaminophen  mg per tablet 1 tablet Q6H PRN    hydrOXYzine HCL tablet 25 mg TID PRN    megestroL tablet 20 mg BID    melatonin tablet 6 mg Nightly PRN    midodrine tablet 15 mg TID WM    mirtazapine tablet 7.5 mg QHS    ondansetron injection 4 mg Q6H PRN    sevelamer  carbonate tablet 800 mg TID WM    sodium chloride 0.9% bolus 250 mL 250 mL PRN    sodium chloride 0.9% bolus 250 mL 250 mL PRN    sodium chloride 0.9% bolus 250 mL 250 mL PRN    sodium chloride 0.9% bolus 250 mL 250 mL PRN    sodium chloride 0.9% bolus 250 mL 250 mL PRN    sodium chloride 0.9% bolus 250 mL 250 mL PRN    sodium chloride 0.9% flush 10 mL PRN    sodium chloride 0.9% flush 2 mL PRN    traMADoL tablet 50 mg BID PRN    vitamin renal formula (B-complex-vitamin c-folic acid) 1 mg per capsule 1 capsule Daily       Objective:     Vital Signs (Most Recent):  Temp: 98.1 °F (36.7 °C) (10/25/23 0806)  Pulse: 106 (10/25/23 0806)  Resp: 16 (10/25/23 0806)  BP: 128/75 (10/25/23 0806)  SpO2: 95 % (10/25/23 0806) Vital Signs (24h Range):  Temp:  [97.4 °F (36.3 °C)-98.1 °F (36.7 °C)] 98.1 °F (36.7 °C)  Pulse:  [] 106  Resp:  [16-30] 16  SpO2:  [87 %-98 %] 95 %  BP: (106-155)/(57-75) 128/75     Weight: 43.2 kg (95 lb 3.8 oz) (10/25/23 0358)  Body mass index is 17.42 kg/m².  Body surface area is 1.37 meters squared.    I/O last 3 completed shifts:  In: 300 [P.O.:300]  Out: -      Physical Exam  Vitals reviewed.   Constitutional:       Appearance: Normal appearance.   HENT:      Head: Normocephalic and atraumatic.   Cardiovascular:      Rate and Rhythm: Normal rate and regular rhythm.      Heart sounds: Normal heart sounds.   Pulmonary:      Effort: Pulmonary effort is normal.      Breath sounds: Normal breath sounds.   Abdominal:      General: Bowel sounds are normal.      Palpations: Abdomen is soft.   Musculoskeletal:         General: No swelling.   Skin:     General: Skin is warm and dry.   Neurological:      General: No focal deficit present.      Mental Status: She is alert and oriented to person, place, and time. Mental status is at baseline.   Psychiatric:         Mood and Affect: Mood normal.         Behavior: Behavior normal.         Thought Content: Thought content normal.         Judgment: Judgment  "normal.          Significant Labs:  Cardiac Markers: No results for input(s): "CKMB", "TROPONINT", "MYOGLOBIN" in the last 168 hours.  CBC:   Recent Labs   Lab 10/25/23  0357   WBC 8.21   RBC 2.78*   HGB 9.2*   HCT 29.0*      *   MCH 33.1*   MCHC 31.7*     CMP:   Recent Labs   Lab 10/22/23  1755 10/24/23  1608 10/25/23  0357   *   < > 100   CALCIUM 8.9   < > 9.2   ALBUMIN 3.3*   < > 2.9*   PROT 7.1  --   --    *   < > 130*   K 4.2   < > 4.8   CO2 22*   < > 18*   CL 90*   < > 91*   BUN 73*   < > 103*   CREATININE 8.7*   < > 11.2*   ALKPHOS 64  --   --    ALT 27  --   --    AST 16  --   --    BILITOT 0.5  --   --     < > = values in this interval not displayed.     No results for input(s): "COLORU", "CLARITYU", "SPECGRAV", "PHUR", "PROTEINUA", "GLUCOSEU", "BILIRUBINCON", "BLOODU", "WBCU", "RBCU", "BACTERIA", "MUCUS", "NITRITE", "LEUKOCYTESUR", "UROBILINOGEN", "HYALINECASTS" in the last 168 hours.     Significant Imaging:  Labs: Reviewed  X-Ray: Reviewed  US: Reviewed  Assessment/Plan:     Renal/  Metabolic acidosis  Metabolic acidosis- will correct with HD- cont to monitor labs.  Will add oral sodium bicarbonate if needed between dialysis treatments.    ESRD (end stage renal disease) on dialysis    ESRD- Pt has changed her mind and this am ( 10/25) and states she wants to try HD while inpt.   Will transfer to ICU and monitor closely with plans to resume intermittent HD.  Pt. And family aware and understand risks of all modes of RRT and agree to proceed today with HD.  Pt still wants to consider hospice upon d/c.    ESRD- Pt not dialyzed since last Friday when she became unresponsive-labs remain stable with no clinical indication for dialysis today.  Pt. States that she does not want to try dialysis again and wants to discuss hospice options @ home.   She understands the risks/ benefits of RRT and does not want dialysis due to her fear of the becoming unresponsive again or continuing to " have hypotension throughout her treatments which she says has been an ongoing and now worsened problem during this hospital course.  Daughter has been contacted by hospice to discuss further.  Continue to monitor labs and clinical volume status and reassess in am.        Thank you for your consult. I will follow-up with patient. Please contact us if you have any additional questions.    Lana Cutler MD  Nephrology  [place] - Telemetry

## 2023-10-25 NOTE — ASSESSMENT & PLAN NOTE
Metabolic acidosis- will correct with HD- cont to monitor labs.  Will add oral sodium bicarbonate if needed between dialysis treatments.

## 2023-10-25 NOTE — SUBJECTIVE & OBJECTIVE
Interval History: Rounded on patient with CM, Palliative attending, patient stated that she feels she's being pressed by the teams. Would prefer to discuss further in group meeting in am.    Requested labs. Renal function    Review of Systems   Respiratory:  Positive for shortness of breath.    Gastrointestinal:  Negative for diarrhea and nausea.   Neurological:  Positive for weakness.     Objective:     Vital Signs (Most Recent):  Temp: 98 °F (36.7 °C) (10/24/23 1604)  Pulse: 99 (10/24/23 1604)  Resp: 20 (10/24/23 1815)  BP: 127/74 (10/24/23 1604)  SpO2: 95 % (10/24/23 1604) Vital Signs (24h Range):  Temp:  [97.5 °F (36.4 °C)-98.2 °F (36.8 °C)] 98 °F (36.7 °C)  Pulse:  [] 99  Resp:  [18-22] 20  SpO2:  [92 %-96 %] 95 %  BP: (102-127)/(58-74) 127/74     Weight: 40.5 kg (89 lb 4.6 oz)  Body mass index is 16.33 kg/m².    Intake/Output Summary (Last 24 hours) at 10/24/2023 1937  Last data filed at 10/24/2023 0624  Gross per 24 hour   Intake 180 ml   Output --   Net 180 ml         Physical Exam  Constitutional:       Appearance: She is ill-appearing.      Interventions: Nasal cannula in place.   HENT:      Head: Normocephalic and atraumatic.   Eyes:      General:         Right eye: No discharge.         Left eye: No discharge.   Cardiovascular:      Rate and Rhythm: Tachycardia present.      Heart sounds: No murmur heard.     No friction rub. No gallop.   Pulmonary:      Effort: Retractions present. No respiratory distress.      Breath sounds: No stridor. No wheezing, rhonchi or rales.   Chest:      Chest wall: No tenderness.   Abdominal:      General: There is no distension.      Palpations: There is no mass.      Tenderness: There is no abdominal tenderness. There is no left CVA tenderness, guarding or rebound.      Hernia: No hernia is present.   Skin:     Coloration: Skin is not jaundiced or pale.      Findings: No bruising, erythema, lesion or rash.   Neurological:      Cranial Nerves: No cranial nerve  "deficit.      Sensory: No sensory deficit.      Motor: No weakness.      Coordination: Coordination normal.      Gait: Gait normal.      Deep Tendon Reflexes: Reflexes normal.             Significant Labs: All pertinent labs within the past 24 hours have been reviewed.  CBC: No results for input(s): "WBC", "HGB", "HCT", "PLT" in the last 48 hours.  CMP:   Recent Labs   Lab 10/24/23  1608   *   K 5.1   CL 89*   CO2 14*   GLU 97   BUN 98*   CREATININE 10.9*   CALCIUM 9.1   ALBUMIN 3.0*   ANIONGAP 27*       Significant Imaging: I have reviewed all pertinent imaging results/findings within the past 24 hours.  "

## 2023-10-25 NOTE — PLAN OF CARE
Problem: Adult Inpatient Plan of Care  Goal: Absence of Hospital-Acquired Illness or Injury  Outcome: Ongoing, Progressing  Goal: Readiness for Transition of Care  Outcome: Ongoing, Progressing     Problem: Adjustment to Illness (Sepsis/Septic Shock)  Goal: Optimal Coping  Outcome: Ongoing, Progressing     Problem: Adult Inpatient Plan of Care  Goal: Plan of Care Review  Outcome: Ongoing, Not Progressing  Goal: Optimal Comfort and Wellbeing  Outcome: Ongoing, Not Progressing

## 2023-10-25 NOTE — ASSESSMENT & PLAN NOTE
Nephrology on board  she has been struggling to complete HD sessions over the last 6 weeks and has becoming more and more frail and tachycardic on the machine and there is some concern she will not be able to continue with dialysis  Attempted dialysis again without any benzos to which pt became unresponsive  Palliative on board  Anticipate d/c in am    10/23  Patient and family are asking about home dialysis. Case management sent referral to [facility]. She is certainly appropriate candidate for hospice.

## 2023-10-25 NOTE — ASSESSMENT & PLAN NOTE
Patient with Hypoxic Respiratory failure which is Acute on chronic.  she is not on home oxygen. Supplemental oxygen was provided and noted-      .   Signs/symptoms of respiratory failure include- respiratory distress. Contributing diagnoses includes - penumonia.  Labs and images were reviewed. Patient Has recent ABG, which has been reviewed. Will treat underlying causes and adjust management of respiratory failure as follows    Multiple admissions for PNA and SOB   CXR Grossly stable appearing cardiopulmonary findings noting pleural effusions and bilateral basilar subsegmental atelectasis.  Developing left lower lung zone consolidation not excluded.    Started on BiPAP and weaned to 1 L NC  Procal 1.44, elevated from prior  Stop vancomycin  Pulm and Nephro feel this is more due to volume overload due to issues receiving dialysis and fluid removal, L lung findings are less likely recurrent pneumonia  Multiple thoracenteses in the past   ceftriaxone and azithromycin was stopped  Monitor without iv abx  Resp cx, BCx NGTD  Leukocytosis resolved

## 2023-10-25 NOTE — SUBJECTIVE & OBJECTIVE
Interval History:   Pt sitting up in chair @ BS- with family present-daughter and - and had discussion regarding dialysis.  Pt c/o SOB  this am and states she does want to try HD today, and understands her risks of treatment. Denies chest pain or edema.      Review of patient's allergies indicates:   Allergen Reactions    Aspirin      Other reaction(s): hx of ulcers    Tetracycline Swelling     Other reaction(s): Swelling    Penicillins Rash     Other reaction(s): Hives  Other reaction(s): Rash  Other reaction(s): Rash  Other reaction(s): Hives     Current Facility-Administered Medications   Medication Frequency    0.9%  NaCl infusion PRN    0.9%  NaCl infusion PRN    0.9%  NaCl infusion Once    acetaminophen tablet 650 mg Q4H PRN    albumin human 25% bottle 25 g PRN    albuterol sulfate nebulizer solution 2.5 mg Q4H PRN    albuterol-ipratropium 2.5 mg-0.5 mg/3 mL nebulizer solution 3 mL Q4H    bisacodyL EC tablet 5 mg Daily PRN    busPIRone tablet 10 mg Once per day on Mon Wed Fri    carbamide peroxide 6.5 % otic solution 5 drop PRN    epoetin hemant-epbx injection 10,000 Units Every Mon, Wed, Fri    fluticasone furoate-vilanteroL 200-25 mcg/dose diskus inhaler 1 puff Daily    heparin (porcine) injection 4,100 Units PRN    heparin (porcine) injection 5,000 Units Q12H    HYDROcodone-acetaminophen  mg per tablet 1 tablet Q6H PRN    hydrOXYzine HCL tablet 25 mg TID PRN    megestroL tablet 20 mg BID    melatonin tablet 6 mg Nightly PRN    midodrine tablet 15 mg TID WM    mirtazapine tablet 7.5 mg QHS    ondansetron injection 4 mg Q6H PRN    sevelamer carbonate tablet 800 mg TID WM    sodium chloride 0.9% bolus 250 mL 250 mL PRN    sodium chloride 0.9% bolus 250 mL 250 mL PRN    sodium chloride 0.9% bolus 250 mL 250 mL PRN    sodium chloride 0.9% bolus 250 mL 250 mL PRN    sodium chloride 0.9% bolus 250 mL 250 mL PRN    sodium chloride 0.9% bolus 250 mL 250 mL PRN    sodium chloride 0.9% flush 10 mL PRN     "sodium chloride 0.9% flush 2 mL PRN    traMADoL tablet 50 mg BID PRN    vitamin renal formula (B-complex-vitamin c-folic acid) 1 mg per capsule 1 capsule Daily       Objective:     Vital Signs (Most Recent):  Temp: 98.1 °F (36.7 °C) (10/25/23 0806)  Pulse: 106 (10/25/23 0806)  Resp: 16 (10/25/23 0806)  BP: 128/75 (10/25/23 0806)  SpO2: 95 % (10/25/23 0806) Vital Signs (24h Range):  Temp:  [97.4 °F (36.3 °C)-98.1 °F (36.7 °C)] 98.1 °F (36.7 °C)  Pulse:  [] 106  Resp:  [16-30] 16  SpO2:  [87 %-98 %] 95 %  BP: (106-155)/(57-75) 128/75     Weight: 43.2 kg (95 lb 3.8 oz) (10/25/23 0358)  Body mass index is 17.42 kg/m².  Body surface area is 1.37 meters squared.    I/O last 3 completed shifts:  In: 300 [P.O.:300]  Out: -      Physical Exam  Vitals reviewed.   Constitutional:       Appearance: Normal appearance.   HENT:      Head: Normocephalic and atraumatic.   Cardiovascular:      Rate and Rhythm: Normal rate and regular rhythm.      Heart sounds: Normal heart sounds.   Pulmonary:      Effort: Pulmonary effort is normal.      Breath sounds: Normal breath sounds.   Abdominal:      General: Bowel sounds are normal.      Palpations: Abdomen is soft.   Musculoskeletal:         General: No swelling.   Skin:     General: Skin is warm and dry.   Neurological:      General: No focal deficit present.      Mental Status: She is alert and oriented to person, place, and time. Mental status is at baseline.   Psychiatric:         Mood and Affect: Mood normal.         Behavior: Behavior normal.         Thought Content: Thought content normal.         Judgment: Judgment normal.          Significant Labs:  Cardiac Markers: No results for input(s): "CKMB", "TROPONINT", "MYOGLOBIN" in the last 168 hours.  CBC:   Recent Labs   Lab 10/25/23  0357   WBC 8.21   RBC 2.78*   HGB 9.2*   HCT 29.0*      *   MCH 33.1*   MCHC 31.7*     CMP:   Recent Labs   Lab 10/22/23  1755 10/24/23  1608 10/25/23  0357   *   < > 100 " "  CALCIUM 8.9   < > 9.2   ALBUMIN 3.3*   < > 2.9*   PROT 7.1  --   --    *   < > 130*   K 4.2   < > 4.8   CO2 22*   < > 18*   CL 90*   < > 91*   BUN 73*   < > 103*   CREATININE 8.7*   < > 11.2*   ALKPHOS 64  --   --    ALT 27  --   --    AST 16  --   --    BILITOT 0.5  --   --     < > = values in this interval not displayed.     No results for input(s): "COLORU", "CLARITYU", "SPECGRAV", "PHUR", "PROTEINUA", "GLUCOSEU", "BILIRUBINCON", "BLOODU", "WBCU", "RBCU", "BACTERIA", "MUCUS", "NITRITE", "LEUKOCYTESUR", "UROBILINOGEN", "HYALINECASTS" in the last 168 hours.     Significant Imaging:  Labs: Reviewed  X-Ray: Reviewed  US: Reviewed  "

## 2023-10-26 LAB
ALBUMIN SERPL BCP-MCNC: 3.1 G/DL (ref 3.5–5.2)
ANION GAP SERPL CALC-SCNC: 15 MMOL/L (ref 8–16)
BASOPHILS # BLD AUTO: 0.02 K/UL (ref 0–0.2)
BASOPHILS NFR BLD: 0.3 % (ref 0–1.9)
BUN SERPL-MCNC: 32 MG/DL (ref 8–23)
CALCIUM SERPL-MCNC: 8.7 MG/DL (ref 8.7–10.5)
CHLORIDE SERPL-SCNC: 97 MMOL/L (ref 95–110)
CO2 SERPL-SCNC: 20 MMOL/L (ref 23–29)
CREAT SERPL-MCNC: 4.8 MG/DL (ref 0.5–1.4)
DIFFERENTIAL METHOD: ABNORMAL
EOSINOPHIL # BLD AUTO: 0.5 K/UL (ref 0–0.5)
EOSINOPHIL NFR BLD: 6.3 % (ref 0–8)
ERYTHROCYTE [DISTWIDTH] IN BLOOD BY AUTOMATED COUNT: 17.4 % (ref 11.5–14.5)
EST. GFR  (NO RACE VARIABLE): 9 ML/MIN/1.73 M^2
GLUCOSE SERPL-MCNC: 83 MG/DL (ref 70–110)
HCT VFR BLD AUTO: 25.9 % (ref 37–48.5)
HGB BLD-MCNC: 8.4 G/DL (ref 12–16)
IMM GRANULOCYTES # BLD AUTO: 0.04 K/UL (ref 0–0.04)
IMM GRANULOCYTES NFR BLD AUTO: 0.6 % (ref 0–0.5)
LYMPHOCYTES # BLD AUTO: 0.5 K/UL (ref 1–4.8)
LYMPHOCYTES NFR BLD: 6.8 % (ref 18–48)
MCH RBC QN AUTO: 33.1 PG (ref 27–31)
MCHC RBC AUTO-ENTMCNC: 32.4 G/DL (ref 32–36)
MCV RBC AUTO: 102 FL (ref 82–98)
MONOCYTES # BLD AUTO: 0.6 K/UL (ref 0.3–1)
MONOCYTES NFR BLD: 8.2 % (ref 4–15)
NEUTROPHILS # BLD AUTO: 5.5 K/UL (ref 1.8–7.7)
NEUTROPHILS NFR BLD: 77.8 % (ref 38–73)
NRBC BLD-RTO: 0 /100 WBC
PHOSPHATE SERPL-MCNC: 4.2 MG/DL (ref 2.7–4.5)
PHOSPHATE SERPL-MCNC: 4.2 MG/DL (ref 2.7–4.5)
PLATELET # BLD AUTO: 172 K/UL (ref 150–450)
PMV BLD AUTO: 9.8 FL (ref 9.2–12.9)
POTASSIUM SERPL-SCNC: 4.1 MMOL/L (ref 3.5–5.1)
PTH-INTACT SERPL-MCNC: 377.2 PG/ML (ref 9–77)
RBC # BLD AUTO: 2.54 M/UL (ref 4–5.4)
SODIUM SERPL-SCNC: 132 MMOL/L (ref 136–145)
WBC # BLD AUTO: 7.11 K/UL (ref 3.9–12.7)

## 2023-10-26 PROCEDURE — 36415 COLL VENOUS BLD VENIPUNCTURE: CPT | Mod: HCNC | Performed by: INTERNAL MEDICINE

## 2023-10-26 PROCEDURE — 94761 N-INVAS EAR/PLS OXIMETRY MLT: CPT | Mod: HCNC

## 2023-10-26 PROCEDURE — 25000003 PHARM REV CODE 250: Mod: HCNC | Performed by: FAMILY MEDICINE

## 2023-10-26 PROCEDURE — 25000003 PHARM REV CODE 250: Mod: HCNC | Performed by: STUDENT IN AN ORGANIZED HEALTH CARE EDUCATION/TRAINING PROGRAM

## 2023-10-26 PROCEDURE — 83970 ASSAY OF PARATHORMONE: CPT | Mod: HCNC | Performed by: INTERNAL MEDICINE

## 2023-10-26 PROCEDURE — 99900035 HC TECH TIME PER 15 MIN (STAT): Mod: HCNC

## 2023-10-26 PROCEDURE — 63600175 PHARM REV CODE 636 W HCPCS: Mod: HCNC | Performed by: FAMILY MEDICINE

## 2023-10-26 PROCEDURE — 25000242 PHARM REV CODE 250 ALT 637 W/ HCPCS: Mod: HCNC | Performed by: FAMILY MEDICINE

## 2023-10-26 PROCEDURE — 94640 AIRWAY INHALATION TREATMENT: CPT | Mod: HCNC

## 2023-10-26 PROCEDURE — 80069 RENAL FUNCTION PANEL: CPT | Mod: HCNC | Performed by: INTERNAL MEDICINE

## 2023-10-26 PROCEDURE — 11000001 HC ACUTE MED/SURG PRIVATE ROOM: Mod: HCNC

## 2023-10-26 PROCEDURE — 85025 COMPLETE CBC W/AUTO DIFF WBC: CPT | Mod: HCNC | Performed by: INTERNAL MEDICINE

## 2023-10-26 PROCEDURE — 63600175 PHARM REV CODE 636 W HCPCS: Mod: HCNC | Performed by: STUDENT IN AN ORGANIZED HEALTH CARE EDUCATION/TRAINING PROGRAM

## 2023-10-26 PROCEDURE — 27000221 HC OXYGEN, UP TO 24 HOURS: Mod: HCNC

## 2023-10-26 RX ORDER — LORAZEPAM 0.5 MG/1
0.5 TABLET ORAL EVERY 6 HOURS PRN
Status: DISCONTINUED | OUTPATIENT
Start: 2023-10-26 | End: 2023-10-27 | Stop reason: HOSPADM

## 2023-10-26 RX ADMIN — MEGESTROL ACETATE 20 MG: 20 TABLET ORAL at 08:10

## 2023-10-26 RX ADMIN — IPRATROPIUM BROMIDE AND ALBUTEROL SULFATE 3 ML: 2.5; .5 SOLUTION RESPIRATORY (INHALATION) at 08:10

## 2023-10-26 RX ADMIN — HYDROXYZINE HYDROCHLORIDE 25 MG: 25 TABLET ORAL at 06:10

## 2023-10-26 RX ADMIN — SEVELAMER CARBONATE 800 MG: 800 TABLET, FILM COATED ORAL at 08:10

## 2023-10-26 RX ADMIN — IPRATROPIUM BROMIDE AND ALBUTEROL SULFATE 3 ML: 2.5; .5 SOLUTION RESPIRATORY (INHALATION) at 07:10

## 2023-10-26 RX ADMIN — HYDROCODONE BITARTRATE AND ACETAMINOPHEN 1 TABLET: 10; 325 TABLET ORAL at 08:10

## 2023-10-26 RX ADMIN — IPRATROPIUM BROMIDE AND ALBUTEROL SULFATE 3 ML: 2.5; .5 SOLUTION RESPIRATORY (INHALATION) at 12:10

## 2023-10-26 RX ADMIN — FLUTICASONE FUROATE AND VILANTEROL TRIFENATATE 1 PUFF: 200; 25 POWDER RESPIRATORY (INHALATION) at 08:10

## 2023-10-26 RX ADMIN — IPRATROPIUM BROMIDE AND ALBUTEROL SULFATE 3 ML: 2.5; .5 SOLUTION RESPIRATORY (INHALATION) at 04:10

## 2023-10-26 RX ADMIN — ONDANSETRON 4 MG: 2 INJECTION INTRAMUSCULAR; INTRAVENOUS at 11:10

## 2023-10-26 RX ADMIN — HEPARIN SODIUM 5000 UNITS: 5000 INJECTION INTRAVENOUS; SUBCUTANEOUS at 08:10

## 2023-10-26 RX ADMIN — NEPHROCAP 1 CAPSULE: 1 CAP ORAL at 08:10

## 2023-10-26 RX ADMIN — ACETAMINOPHEN 325MG 650 MG: 325 TABLET ORAL at 10:10

## 2023-10-26 RX ADMIN — HYDROXYZINE HYDROCHLORIDE 25 MG: 25 TABLET ORAL at 12:10

## 2023-10-26 RX ADMIN — MIDODRINE HYDROCHLORIDE 15 MG: 5 TABLET ORAL at 08:10

## 2023-10-26 RX ADMIN — MIRTAZAPINE 7.5 MG: 7.5 TABLET ORAL at 08:10

## 2023-10-26 RX ADMIN — IPRATROPIUM BROMIDE AND ALBUTEROL SULFATE 3 ML: 2.5; .5 SOLUTION RESPIRATORY (INHALATION) at 03:10

## 2023-10-26 RX ADMIN — MIDODRINE HYDROCHLORIDE 15 MG: 5 TABLET ORAL at 04:10

## 2023-10-26 RX ADMIN — SEVELAMER CARBONATE 800 MG: 800 TABLET, FILM COATED ORAL at 04:10

## 2023-10-26 NOTE — PROGRESS NOTES
[facility] Progress Note      Assessment/Plan:     Katie Quevedo is a 78 y.o.female with    Goals of Care Discussion       Family requested an attempt of HD today, performed in HD unit today       HD performed with 1 L removed. Afterwards had an episode of anxiety with O2 desaturation to 83% and was placed on BiPAP then placed on 5-6HF    ESRD (end stage renal disease) on dialysis   Nephrology on board  she has been struggling to complete HD sessions over the last 6 weeks and has becoming more and more frail and tachycardic on the machine and there is some concern she will not be able to continue with dialysis  Attempted dialysis again without any benzos to which pt became unresponsive  Palliative on board  Anticipate d/c in am  10/23  Patient and family are asking about home dialysis. Case management sent referral to [facility]. She is certainly appropriate candidate for hospice.     Acute on chronic respiratory failure with hypoxia  Patient with Hypoxic Respiratory failure which is Acute on chronic.  she is not on home oxygen. Supplemental oxygen was provided and noted-    Multiple admissions for PNA and SOB   CXR Grossly stable appearing cardiopulmonary findings noting pleural effusions and bilateral basilar subsegmental atelectasis.  Developing left lower lung zone consolidation not excluded.    Procal 1.44, elevated from prior  Pulm and Nephro feel this is more due to volume overload due to issues receiving dialysis and fluid removal, L lung findings are less likely recurrent pneumonia  Multiple thoracenteses in the past     Frailty syndrome in geriatric patient  Group meeting in am  Will discuss trying HD one more time in the ICU vs hospice     Hyperkalemia  Potassium stable     Goals of care, counseling/discussion  Advance Care Planning      Code Status  In light of the patients advanced and life limiting illness,I engaged the the patient in a voluntary conversation about the patient's  preferences for care  at the very end of life. The patient wishes to have a natural, peaceful death.  Along those lines, the patient does not wish to have CPR or other invasive treatments performed when her heart and/or breathing stops. I communicated to the patient that a DNR form was completed and will be scanned into EPIC.    Palliative on board  Patient and family are asking about home dialysis. Case management sent referral to [facility].   She is appropriate candidate for hospice.      Community acquired pneumonia of left lower lobe of lung  S/p 5 days of treatment  Pneumonia ruled out  Leukocytosis most likely from steroids which was also stopped    Heparin 5k q12    Subjective:      Katie Quevedo is a 78 y.o.  female who is being followed by [facility] for hypoxic respiratory failure, ESRD    Met with Nephrology this morning and family. Ms. Quevedo is in good spirits and would like to try HD today.    Review of Systems   Constitutional:  Negative for chills, fever and malaise/fatigue.   Respiratory:  Positive for shortness of breath.    Cardiovascular:  Negative for chest pain and leg swelling.   Genitourinary:  Negative for dysuria, frequency and urgency.   Musculoskeletal:  Negative for back pain.   Neurological:  Positive for weakness. Negative for dizziness, tingling and headaches.   Psychiatric/Behavioral:  Negative for depression and memory loss. The patient is nervous/anxious.         Objective:   Last 24 Hour Vital Signs:  BP  Min: 89/53  Max: 195/79  Temp  Av.8 °F (36.6 °C)  Min: 97.4 °F (36.3 °C)  Max: 98.3 °F (36.8 °C)  Pulse  Av.5  Min: 64  Max: 124  Resp  Av.1  Min: 16  Max: 35  SpO2  Av.5 %  Min: 85 %  Max: 98 %  Weight  Av.2 kg (95 lb 3.8 oz)  Min: 43.2 kg (95 lb 3.8 oz)  Max: 43.2 kg (95 lb 3.8 oz)  I/O last 3 completed shifts:  In: 420 [P.O.:420]  Out: -     Physical Exam  Constitutional:       General: She is not in acute distress.      Appearance: She is ill-appearing.   HENT:      Head: Normocephalic and atraumatic.   Cardiovascular:      Rate and Rhythm: Regular rhythm. Tachycardia present.      Heart sounds: No murmur heard.  Pulmonary:      Breath sounds: Examination of the left-lower field reveals decreased breath sounds. Decreased breath sounds present. No rales.   Abdominal:      General: Abdomen is flat. Bowel sounds are normal.      Palpations: Abdomen is soft.   Musculoskeletal:      Right lower leg: No edema.      Left lower leg: No edema.   Neurological:      General: No focal deficit present.      Motor: Weakness present.           Laboratory:  Laboratory Data Reviewed: yes  Pertinent Findings:      Microbiology Data Reviewed: yes  Pertinent Findings:      Other Results:  EKG (my interpretation): .    Radiology Data Reviewed: yes  Pertinent Findings:      Current Medications:     Infusions:       Scheduled:   albuterol-ipratropium  3 mL Nebulization Q4H    busPIRone  10 mg Oral Once per day on Mon Wed Fri    epoetin hemant-epbx  10,000 Units Subcutaneous Every Mon, Wed, Fri    fluticasone furoate-vilanteroL  1 puff Inhalation Daily    heparin (porcine)  5,000 Units Subcutaneous Q12H    megestroL  20 mg Oral BID    midodrine  15 mg Oral TID WM    mirtazapine  7.5 mg Oral QHS    sevelamer carbonate  800 mg Oral TID WM    vitamin renal formula (B-complex-vitamin c-folic acid)  1 capsule Oral Daily        PRN:  sodium chloride 0.9%, sodium chloride 0.9%, acetaminophen, albumin human 25%, albuterol sulfate, bisacodyL, carbamide peroxide, heparin (porcine), HYDROcodone-acetaminophen, hydrOXYzine HCL, melatonin, ondansetron, sodium chloride 0.9%, sodium chloride 0.9%, sodium chloride 0.9%, sodium chloride 0.9%, sodium chloride 0.9%, sodium chloride 0.9%, sodium chloride 0.9%, sodium chloride 0.9%, traMADoL    Antibiotics and Day Number of Therapy:      Lines and Day Number of Therapy:      Franchesca Tillman MD  [facility]  Medicine

## 2023-10-26 NOTE — PROGRESS NOTES
[facility] Progress Note      Assessment/Plan:     Katie Quevedo is a 78 y.o.female with    Goals of Care Discussion       Family requested an attempt of HD today, performed in HD unit today       HD performed with 1 L removed. Afterwards had an episode of anxiety with O2 desaturation to 83% and was placed on BiPAP then placed on 5-6HF       Plan for hospice with HD, working with [facility] to arrange this as an outpatient. Palliative, Nephrology and CM working on this    ESRD (end stage renal disease) on dialysis   Nephrology on board  she has been struggling to complete HD sessions over the last 6 weeks and has becoming more and more frail and tachycardic on the machine and there is some concern she will not be able to continue with dialysis  Attempted dialysis again without any benzos to which pt became unresponsive  10/25 dialyzed successfully but needed BiPAP due to anxiety then was placed back on nasal canula  Palliative on board    Acute on chronic respiratory failure with hypoxia  Patient with Hypoxic Respiratory failure which is Acute on chronic.  she is not on home oxygen. Supplemental oxygen was provided and noted-    Multiple admissions for PNA and SOB   CXR Grossly stable appearing cardiopulmonary findings noting pleural effusions and bilateral basilar subsegmental atelectasis.  Developing left lower lung zone consolidation not excluded.    Procal 1.44, elevated from prior  Pulm and Nephro feel this is more due to volume overload due to issues receiving dialysis and fluid removal, L lung findings are less likely recurrent pneumonia  Multiple thoracenteses in the past     Hyperkalemia-resolved  Potassium stable     Goals of care, counseling/discussion  Advance Care Planning      Code Status  In light of the patients advanced and life limiting illness,I engaged the the patient in a voluntary conversation about the patient's preferences for care  at the very end of life. The patient wishes  to have a natural, peaceful death.  Along those lines, the patient does not wish to have CPR or other invasive treatments performed when her heart and/or breathing stops. I communicated to the patient that a DNR form was completed and will be scanned into EPIC.    Palliative on board  Patient and family are asking about home dialysis. Case management sent referral to [facility].   She is appropriate candidate for hospice.      Community acquired pneumonia of left lower lobe of lung  S/p 5 days of treatment  Pneumonia ruled out  Leukocytosis most likely from steroids which was also stopped    Heparin 5k q12    Subjective:      Katie Quevedo is a 78 y.o.  female who is being followed by [facility] for hypoxic respiratory failure, ESRD    Patient,  and daughter in good spirits. Patient expressed that she would like Palliative care with outpatient PRN dialysis.    Review of Systems   Constitutional:  Negative for chills, fever and malaise/fatigue.   Respiratory:  Positive for shortness of breath.    Cardiovascular:  Negative for chest pain and leg swelling.   Genitourinary:  Negative for dysuria, frequency and urgency.   Musculoskeletal:  Negative for back pain.   Neurological:  Positive for weakness. Negative for dizziness, tingling and headaches.   Psychiatric/Behavioral:  Negative for depression and memory loss. The patient is nervous/anxious.         Objective:   Last 24 Hour Vital Signs:  BP  Min: 97/53  Max: 195/79  Temp  Av °F (36.7 °C)  Min: 97.5 °F (36.4 °C)  Max: 98.5 °F (36.9 °C)  Pulse  Av.7  Min: 102  Max: 124  Resp  Av  Min: 17  Max: 35  SpO2  Av.1 %  Min: 85 %  Max: 100 %  I/O last 3 completed shifts:  In: 1170 [P.O.:670; Other:500]  Out: 1500 [Other:1500]    Physical Exam  Constitutional:       General: She is not in acute distress.     Appearance: She is ill-appearing.   HENT:      Head: Normocephalic and atraumatic.   Cardiovascular:      Rate and  Rhythm: Regular rhythm. Tachycardia present.      Heart sounds: No murmur heard.  Pulmonary:      Breath sounds: Examination of the left-lower field reveals decreased breath sounds. Decreased breath sounds present. No rales.   Abdominal:      General: Abdomen is flat. Bowel sounds are normal.      Palpations: Abdomen is soft.   Musculoskeletal:      Right lower leg: No edema.      Left lower leg: No edema.   Neurological:      General: No focal deficit present.      Motor: Weakness present.           Laboratory:  Laboratory Data Reviewed: yes  Pertinent Findings:      Microbiology Data Reviewed: yes  Pertinent Findings:      Other Results:  EKG (my interpretation): .    Radiology Data Reviewed: yes  Pertinent Findings:      Current Medications:     Infusions:       Scheduled:   albuterol-ipratropium  3 mL Nebulization Q4H    busPIRone  10 mg Oral Once per day on Mon Wed Fri    epoetin hemant-epbx  10,000 Units Subcutaneous Every Mon, Wed, Fri    fluticasone furoate-vilanteroL  1 puff Inhalation Daily    heparin (porcine)  5,000 Units Subcutaneous Q12H    megestroL  20 mg Oral BID    midodrine  15 mg Oral TID WM    mirtazapine  7.5 mg Oral QHS    sevelamer carbonate  800 mg Oral TID WM    vitamin renal formula (B-complex-vitamin c-folic acid)  1 capsule Oral Daily        PRN:  sodium chloride 0.9%, sodium chloride 0.9%, acetaminophen, albumin human 25%, albuterol sulfate, bisacodyL, carbamide peroxide, heparin (porcine), HYDROcodone-acetaminophen, hydrOXYzine HCL, LORazepam, melatonin, ondansetron, sodium chloride 0.9%, sodium chloride 0.9%, sodium chloride 0.9%, sodium chloride 0.9%, sodium chloride 0.9%, sodium chloride 0.9%, sodium chloride 0.9%, sodium chloride 0.9%, traMADoL    Antibiotics and Day Number of Therapy:      Lines and Day Number of Therapy:      Franchesca Tillman MD  [facility]

## 2023-10-26 NOTE — ASSESSMENT & PLAN NOTE
SOB-  etiology multifactorial - due to COPD/ pulmonary HTN and pulmonary edema and anxiety - remain stable with supp oxygen -Tolerated about 1 liter UF with dialysis yesterday.  Resume oral Ativan for anxiety especially pre dialysis on MWF.

## 2023-10-26 NOTE — PLAN OF CARE
"  Problem: Adult Inpatient Plan of Care  Goal: Plan of Care Review  Outcome: Ongoing, Progressing     Problem: Adult Inpatient Plan of Care  Goal: Optimal Comfort and Wellbeing  Outcome: Ongoing, Progressing     Problem: Respiratory Compromise (Pneumonia)  Goal: Effective Oxygenation and Ventilation  Outcome: Ongoing, Progressing     Problem: Device-Related Complication Risk (Hemodialysis)  Goal: Safe, Effective Therapy Delivery  Outcome: Ongoing, Progressing     Problem: Hemodynamic Instability (Hemodialysis)  Goal: Effective Tissue Perfusion  Outcome: Ongoing, Progressing     .Plan of care reviewed with the patient. Scheduled medicines given and the patient tolerated well. Fall and safety precautions taken and the standard interventions are in place. On Telemetry monitoring with Sinus Tachycardia, no true "red alarms' noted, no acute distress reported either in the shift. On Oxygen 2.5 L and satting well. Advised the patient to call for the assistance. Continued monitoring the patient.   "

## 2023-10-26 NOTE — PROGRESS NOTES
[facility] - Telemetry  Nephrology  Progress Note    Patient Name: Katie Quevedo  MRN: [id number]  Admission Date: 10/12/2023  Hospital Length of Stay: 14 days  Attending Provider: Franchesca Tillman MD   Primary Care Physician: Kingston Verduzco MD  Principal Problem:Acute on chronic respiratory failure with hypoxia    Subjective:     HPI: No notes on file    Interval History:   Pt. Stable this am- seen after getting breathing treatment, c/o slight increase in SOB and feels more anxious.      Review of patient's allergies indicates:   Allergen Reactions    Aspirin      Other reaction(s): hx of ulcers    Tetracycline Swelling     Other reaction(s): Swelling    Penicillins Rash     Other reaction(s): Hives  Other reaction(s): Rash  Other reaction(s): Rash  Other reaction(s): Hives     Current Facility-Administered Medications   Medication Frequency    0.9%  NaCl infusion PRN    0.9%  NaCl infusion PRN    acetaminophen tablet 650 mg Q4H PRN    albumin human 25% bottle 25 g PRN    albuterol sulfate nebulizer solution 2.5 mg Q4H PRN    albuterol-ipratropium 2.5 mg-0.5 mg/3 mL nebulizer solution 3 mL Q4H    bisacodyL EC tablet 5 mg Daily PRN    busPIRone tablet 10 mg Once per day on Mon Wed Fri    carbamide peroxide 6.5 % otic solution 5 drop PRN    epoetin hemant-epbx injection 10,000 Units Every Mon, Wed, Fri    fluticasone furoate-vilanteroL 200-25 mcg/dose diskus inhaler 1 puff Daily    heparin (porcine) injection 4,100 Units PRN    heparin (porcine) injection 5,000 Units Q12H    HYDROcodone-acetaminophen  mg per tablet 1 tablet Q6H PRN    hydrOXYzine HCL tablet 25 mg TID PRN    LORazepam tablet 0.5 mg Q6H PRN    megestroL tablet 20 mg BID    melatonin tablet 6 mg Nightly PRN    midodrine tablet 15 mg TID WM    mirtazapine tablet 7.5 mg QHS    ondansetron injection 4 mg Q6H PRN    sevelamer carbonate tablet 800 mg TID WM    sodium chloride 0.9% bolus 250 mL 250 mL PRN    sodium chloride 0.9%  bolus 250 mL 250 mL PRN    sodium chloride 0.9% bolus 250 mL 250 mL PRN    sodium chloride 0.9% bolus 250 mL 250 mL PRN    sodium chloride 0.9% bolus 250 mL 250 mL PRN    sodium chloride 0.9% bolus 250 mL 250 mL PRN    sodium chloride 0.9% flush 10 mL PRN    sodium chloride 0.9% flush 2 mL PRN    traMADoL tablet 50 mg BID PRN    vitamin renal formula (B-complex-vitamin c-folic acid) 1 mg per capsule 1 capsule Daily       Objective:     Vital Signs (Most Recent):  Temp: 98.5 °F (36.9 °C) (10/26/23 1637)  Pulse: (!) 118 (10/26/23 1637)  Resp: 18 (10/26/23 1637)  BP: (!) 102/56 (10/26/23 1637)  SpO2: 97 % (10/26/23 1637) Vital Signs (24h Range):  Temp:  [97.5 °F (36.4 °C)-98.5 °F (36.9 °C)] 98.5 °F (36.9 °C)  Pulse:  [102-124] 118  Resp:  [17-35] 18  SpO2:  [85 %-100 %] 97 %  BP: ()/(53-94) 102/56     Weight: 43.2 kg (95 lb 3.8 oz) (10/25/23 0358)  Body mass index is 17.42 kg/m².  Body surface area is 1.37 meters squared.    I/O last 3 completed shifts:  In: 1170 [P.O.:670; Other:500]  Out: 1500 [Other:1500]     Physical Exam  Vitals reviewed.   Constitutional:       General: She is not in acute distress.     Appearance: Normal appearance. She is not ill-appearing.   HENT:      Head: Normocephalic and atraumatic.   Cardiovascular:      Rate and Rhythm: Normal rate and regular rhythm.      Heart sounds: Normal heart sounds.      No friction rub.   Pulmonary:      Effort: Pulmonary effort is normal.      Breath sounds: Normal breath sounds.   Abdominal:      General: Bowel sounds are normal.      Palpations: Abdomen is soft.   Musculoskeletal:         General: No swelling.   Skin:     General: Skin is warm and dry.   Neurological:      General: No focal deficit present.      Mental Status: She is alert and oriented to person, place, and time. Mental status is at baseline.   Psychiatric:         Mood and Affect: Mood normal.         Behavior: Behavior normal.         Thought Content: Thought content normal.   "       Judgment: Judgment normal.          Significant Labs:  Cardiac Markers: No results for input(s): "CKMB", "TROPONINT", "MYOGLOBIN" in the last 168 hours.  CBC:   Recent Labs   Lab 10/26/23  0319   WBC 7.11   RBC 2.54*   HGB 8.4*   HCT 25.9*      *   MCH 33.1*   MCHC 32.4     CMP:   Recent Labs   Lab 10/22/23  1755 10/24/23  1608 10/26/23  0319   *   < > 83   CALCIUM 8.9   < > 8.7   ALBUMIN 3.3*   < > 3.1*   PROT 7.1  --   --    *   < > 132*   K 4.2   < > 4.1   CO2 22*   < > 20*   CL 90*   < > 97   BUN 73*   < > 32*   CREATININE 8.7*   < > 4.8*   ALKPHOS 64  --   --    ALT 27  --   --    AST 16  --   --    BILITOT 0.5  --   --     < > = values in this interval not displayed.     No results for input(s): "COLORU", "CLARITYU", "SPECGRAV", "PHUR", "PROTEINUA", "GLUCOSEU", "BILIRUBINCON", "BLOODU", "WBCU", "RBCU", "BACTERIA", "MUCUS", "NITRITE", "LEUKOCYTESUR", "UROBILINOGEN", "HYALINECASTS" in the last 168 hours.     Significant Imaging:  Labs: Reviewed  X-Ray: Reviewed    Assessment/Plan:     Pulmonary  Shortness of breath  SOB-  etiology multifactorial - due to COPD/ pulmonary HTN and pulmonary edema and anxiety - remain stable with supp oxygen -Tolerated about 1 liter UF with dialysis yesterday.  Resume oral Ativan for anxiety especially pre dialysis on MWF.      Renal/  ESRD (end stage renal disease) on dialysis    ESRD- Pt tolerated gentle HD treatment yesterday with only 1.5 liters fluid removal due to anxiety and SOB throughout the treatment.  Will plan for HD again in am.  Await outpt dialysis planning for d/c.  Repeat labs in am pre dialysis and titrate UF as tolerated.    ESRD- Pt not dialyzed since last Friday when she became unresponsive-labs remain stable with no clinical indication for dialysis today.  Pt. States that she does not want to try dialysis again and wants to discuss hospice options @ home.   She understands the risks/ benefits of RRT and does not want dialysis " due to her fear of the becoming unresponsive again or continuing to have hypotension throughout her treatments which she says has been an ongoing and now worsened problem during this hospital course.  Daughter has been contacted by hospice to discuss further.  Continue to monitor labs and clinical volume status and reassess in am.    Endocrine  Secondary hyperparathyroidism  Secondary HPT due to ESRD- iPTH , Ca and phos levels stable.  Cont Marry D supp, oral phos binders and Renal diet.        Thank you for your consult. I will follow-up with patient. Please contact us if you have any additional questions.    Lana Cutler MD  Nephrology  [place] - Telemetry

## 2023-10-26 NOTE — ASSESSMENT & PLAN NOTE
Secondary HPT due to ESRD- iPTH , Ca and phos levels stable.  Cont Marry D supp, oral phos binders and Renal diet.

## 2023-10-26 NOTE — NURSING
RAPID RESPONSE NURSE PROACTIVE ROUNDING NOTE       Time of Visit:     Admit Date: 10/12/2023  LOS: 13  Code Status: DNR   Date of Visit: 10/25/2023  : 1945  Age: 78 y.o.  Sex: female  Race: White  Bed: K472/K472 A:   MRN: [id number]  Was the patient discharged from an ICU this admission? No   Was the patient discharged from a PACU within last 24 hours? No   Did the patient receive conscious sedation/general anesthesia in last 24 hours? No   Was the patient in the ED within the past 24 hours? No   Was the patient on NIPPV within the past 24 hours? No   Attending Physician: Franchesca Tillman MD  Primary Service: Internal Medicine,Hospitalist   Time spent at the bedside: < 15 min    SITUATION      Reason for alert: Chart review    Diagnosis: Acute on chronic respiratory failure with hypoxia   has a past medical history of Acute congestive heart failure, Anemia, Bilateral renal cysts, Cataract, Chronic LBP, Chronic pain, CKD (chronic kidney disease), stage IV, Colon polyp, COPD (chronic obstructive pulmonary disease), Dehydration, Encounter for blood transfusion, HTN (hypertension), Lumbar spondylosis, Melanoma, Metabolic bone disease, Migraines, neuralgic, Osteoporosis, Primary osteoarthritis of both knees, Pulmonary embolism with infarction, Seizures, Subdeltoid bursitis, L>R., Ulcer, and Vitamin D deficiency disease.    Last Vitals:  Temp: 97.7 °F (36.5 °C) (10/25 1922)  Pulse: 124 (10/25 1935)  Resp: 22 (10/25 2032)  BP: 195/79 (10/25 1922)  SpO2: 97 % (10/25 1940)    24 Hour Vitals Range:  Temp:  [97.4 °F (36.3 °C)-98.3 °F (36.8 °C)]   Pulse:  []   Resp:  [16-35]   BP: ()/(53-94)   SpO2:  [85 %-98 %]       ASSESSMENT/INTERVENTIONS    Pt resting in bed, noted to be tachycardic on telemetry. Complains of 10/10 pain. Primary nurse at bedside, morphine administered.     RECOMMENDATIONS    Will continue to monitor.    Discussed plan of care with bedside RN.    FOLLOW UP    Call back the Rapid  Response Nurse, Beltran TREJO at [phone] for additional questions or concerns.

## 2023-10-26 NOTE — RESPIRATORY THERAPY
Upon arrival patient was on cpap. Breathing treatment was given via cpap mask. During assessment patient displayed shallow fast breathing. Pt  was very anxious and wanted to remove cpap mask. O2 decreased from 92 to 83 on cpap.     Patient stated that she has bad anxiety after dialysis.    Patient bed has been elevated with pillows placed behind back.     Nurse and MD is informed of patient's observation.    Bipap was removed. Patient is placed on 5-6L high flow nasal canula and coached to take deep slow breaths. Sats increased 96%.     Family is at bedside.     Patient's breathing has improved.    MD is at bedside.

## 2023-10-26 NOTE — SUBJECTIVE & OBJECTIVE
Interval History:   Pt. Stable this am- seen after getting breathing treatment, c/o slight increase in SOB and feels more anxious.      Review of patient's allergies indicates:   Allergen Reactions    Aspirin      Other reaction(s): hx of ulcers    Tetracycline Swelling     Other reaction(s): Swelling    Penicillins Rash     Other reaction(s): Hives  Other reaction(s): Rash  Other reaction(s): Rash  Other reaction(s): Hives     Current Facility-Administered Medications   Medication Frequency    0.9%  NaCl infusion PRN    0.9%  NaCl infusion PRN    acetaminophen tablet 650 mg Q4H PRN    albumin human 25% bottle 25 g PRN    albuterol sulfate nebulizer solution 2.5 mg Q4H PRN    albuterol-ipratropium 2.5 mg-0.5 mg/3 mL nebulizer solution 3 mL Q4H    bisacodyL EC tablet 5 mg Daily PRN    busPIRone tablet 10 mg Once per day on Mon Wed Fri    carbamide peroxide 6.5 % otic solution 5 drop PRN    epoetin hemant-epbx injection 10,000 Units Every Mon, Wed, Fri    fluticasone furoate-vilanteroL 200-25 mcg/dose diskus inhaler 1 puff Daily    heparin (porcine) injection 4,100 Units PRN    heparin (porcine) injection 5,000 Units Q12H    HYDROcodone-acetaminophen  mg per tablet 1 tablet Q6H PRN    hydrOXYzine HCL tablet 25 mg TID PRN    LORazepam tablet 0.5 mg Q6H PRN    megestroL tablet 20 mg BID    melatonin tablet 6 mg Nightly PRN    midodrine tablet 15 mg TID WM    mirtazapine tablet 7.5 mg QHS    ondansetron injection 4 mg Q6H PRN    sevelamer carbonate tablet 800 mg TID WM    sodium chloride 0.9% bolus 250 mL 250 mL PRN    sodium chloride 0.9% bolus 250 mL 250 mL PRN    sodium chloride 0.9% bolus 250 mL 250 mL PRN    sodium chloride 0.9% bolus 250 mL 250 mL PRN    sodium chloride 0.9% bolus 250 mL 250 mL PRN    sodium chloride 0.9% bolus 250 mL 250 mL PRN    sodium chloride 0.9% flush 10 mL PRN    sodium chloride 0.9% flush 2 mL PRN    traMADoL tablet 50 mg BID PRN    vitamin renal formula (B-complex-vitamin c-folic  "acid) 1 mg per capsule 1 capsule Daily       Objective:     Vital Signs (Most Recent):  Temp: 98.5 °F (36.9 °C) (10/26/23 1637)  Pulse: (!) 118 (10/26/23 1637)  Resp: 18 (10/26/23 1637)  BP: (!) 102/56 (10/26/23 1637)  SpO2: 97 % (10/26/23 1637) Vital Signs (24h Range):  Temp:  [97.5 °F (36.4 °C)-98.5 °F (36.9 °C)] 98.5 °F (36.9 °C)  Pulse:  [102-124] 118  Resp:  [17-35] 18  SpO2:  [85 %-100 %] 97 %  BP: ()/(53-94) 102/56     Weight: 43.2 kg (95 lb 3.8 oz) (10/25/23 0358)  Body mass index is 17.42 kg/m².  Body surface area is 1.37 meters squared.    I/O last 3 completed shifts:  In: 1170 [P.O.:670; Other:500]  Out: 1500 [Other:1500]     Physical Exam  Vitals reviewed.   Constitutional:       General: She is not in acute distress.     Appearance: Normal appearance. She is not ill-appearing.   HENT:      Head: Normocephalic and atraumatic.   Cardiovascular:      Rate and Rhythm: Normal rate and regular rhythm.      Heart sounds: Normal heart sounds.      No friction rub.   Pulmonary:      Effort: Pulmonary effort is normal.      Breath sounds: Normal breath sounds.   Abdominal:      General: Bowel sounds are normal.      Palpations: Abdomen is soft.   Musculoskeletal:         General: No swelling.   Skin:     General: Skin is warm and dry.   Neurological:      General: No focal deficit present.      Mental Status: She is alert and oriented to person, place, and time. Mental status is at baseline.   Psychiatric:         Mood and Affect: Mood normal.         Behavior: Behavior normal.         Thought Content: Thought content normal.         Judgment: Judgment normal.          Significant Labs:  Cardiac Markers: No results for input(s): "CKMB", "TROPONINT", "MYOGLOBIN" in the last 168 hours.  CBC:   Recent Labs   Lab 10/26/23  0319   WBC 7.11   RBC 2.54*   HGB 8.4*   HCT 25.9*      *   MCH 33.1*   MCHC 32.4     CMP:   Recent Labs   Lab 10/22/23  1755 10/24/23  1608 10/26/23  0319   *   < > 83 " "  CALCIUM 8.9   < > 8.7   ALBUMIN 3.3*   < > 3.1*   PROT 7.1  --   --    *   < > 132*   K 4.2   < > 4.1   CO2 22*   < > 20*   CL 90*   < > 97   BUN 73*   < > 32*   CREATININE 8.7*   < > 4.8*   ALKPHOS 64  --   --    ALT 27  --   --    AST 16  --   --    BILITOT 0.5  --   --     < > = values in this interval not displayed.     No results for input(s): "COLORU", "CLARITYU", "SPECGRAV", "PHUR", "PROTEINUA", "GLUCOSEU", "BILIRUBINCON", "BLOODU", "WBCU", "RBCU", "BACTERIA", "MUCUS", "NITRITE", "LEUKOCYTESUR", "UROBILINOGEN", "HYALINECASTS" in the last 168 hours.     Significant Imaging:  Labs: Reviewed  X-Ray: Reviewed  "

## 2023-10-26 NOTE — PROGRESS NOTES
10/25/23 1910   Vital Signs   Temp 97.5 °F (36.4 °C)   Temp Source Temporal   Pulse (!) 120   Heart Rate Source Monitor   Resp 20   Flow (L/min) 2   Device (Oxygen Therapy) nasal cannula   /72   BP Location Left leg   BP Method Automatic   Patient Position Lying   Post-Hemodialysis Assessment   Rinseback Volume (mL) 250 mL   Blood Volume Processed (Liters) 45.1 L   Dialyzer Clearance Clear   Duration of Treatment 180 minutes   Additional Fluid Intake (mL) 500 mL   Total UF (mL) 1500 mL   Net Fluid Removal 1000   Patient Response to Treatment well   Post-Hemodialysis Comments Blood returned, lines flushed, heparin instilled into cvc, lines capped and clamped

## 2023-10-27 ENCOUNTER — TELEPHONE (OUTPATIENT)
Dept: PULMONOLOGY | Facility: CLINIC | Age: 78
End: 2023-10-27
Payer: MEDICARE

## 2023-10-27 VITALS
RESPIRATION RATE: 20 BRPM | TEMPERATURE: 98 F | DIASTOLIC BLOOD PRESSURE: 65 MMHG | BODY MASS INDEX: 17.53 KG/M2 | HEIGHT: 62 IN | HEART RATE: 100 BPM | SYSTOLIC BLOOD PRESSURE: 142 MMHG | WEIGHT: 95.25 LBS | OXYGEN SATURATION: 92 %

## 2023-10-27 LAB
ALBUMIN SERPL BCP-MCNC: 3 G/DL (ref 3.5–5.2)
ANION GAP SERPL CALC-SCNC: 15 MMOL/L (ref 8–16)
BASOPHILS # BLD AUTO: 0.02 K/UL (ref 0–0.2)
BASOPHILS NFR BLD: 0.3 % (ref 0–1.9)
BUN SERPL-MCNC: 47 MG/DL (ref 8–23)
CALCIUM SERPL-MCNC: 9.4 MG/DL (ref 8.7–10.5)
CHLORIDE SERPL-SCNC: 95 MMOL/L (ref 95–110)
CO2 SERPL-SCNC: 22 MMOL/L (ref 23–29)
CREAT SERPL-MCNC: 6.9 MG/DL (ref 0.5–1.4)
DIFFERENTIAL METHOD: ABNORMAL
EOSINOPHIL # BLD AUTO: 0.8 K/UL (ref 0–0.5)
EOSINOPHIL NFR BLD: 10.1 % (ref 0–8)
ERYTHROCYTE [DISTWIDTH] IN BLOOD BY AUTOMATED COUNT: 17.4 % (ref 11.5–14.5)
EST. GFR  (NO RACE VARIABLE): 6 ML/MIN/1.73 M^2
GLUCOSE SERPL-MCNC: 96 MG/DL (ref 70–110)
HCT VFR BLD AUTO: 28.2 % (ref 37–48.5)
HGB BLD-MCNC: 9 G/DL (ref 12–16)
IMM GRANULOCYTES # BLD AUTO: 0.03 K/UL (ref 0–0.04)
IMM GRANULOCYTES NFR BLD AUTO: 0.4 % (ref 0–0.5)
LYMPHOCYTES # BLD AUTO: 0.9 K/UL (ref 1–4.8)
LYMPHOCYTES NFR BLD: 11.6 % (ref 18–48)
MCH RBC QN AUTO: 32.8 PG (ref 27–31)
MCHC RBC AUTO-ENTMCNC: 31.9 G/DL (ref 32–36)
MCV RBC AUTO: 103 FL (ref 82–98)
MONOCYTES # BLD AUTO: 0.8 K/UL (ref 0.3–1)
MONOCYTES NFR BLD: 11.1 % (ref 4–15)
NEUTROPHILS # BLD AUTO: 5.1 K/UL (ref 1.8–7.7)
NEUTROPHILS NFR BLD: 66.5 % (ref 38–73)
NRBC BLD-RTO: 0 /100 WBC
PHOSPHATE SERPL-MCNC: 4.3 MG/DL (ref 2.7–4.5)
PLATELET # BLD AUTO: 204 K/UL (ref 150–450)
PMV BLD AUTO: 9.9 FL (ref 9.2–12.9)
POTASSIUM SERPL-SCNC: 4.4 MMOL/L (ref 3.5–5.1)
RBC # BLD AUTO: 2.74 M/UL (ref 4–5.4)
SODIUM SERPL-SCNC: 132 MMOL/L (ref 136–145)
WBC # BLD AUTO: 7.59 K/UL (ref 3.9–12.7)

## 2023-10-27 PROCEDURE — 25000003 PHARM REV CODE 250: Mod: HCNC | Performed by: STUDENT IN AN ORGANIZED HEALTH CARE EDUCATION/TRAINING PROGRAM

## 2023-10-27 PROCEDURE — 85025 COMPLETE CBC W/AUTO DIFF WBC: CPT | Mod: HCNC | Performed by: INTERNAL MEDICINE

## 2023-10-27 PROCEDURE — 36415 COLL VENOUS BLD VENIPUNCTURE: CPT | Mod: HCNC | Performed by: INTERNAL MEDICINE

## 2023-10-27 PROCEDURE — 97530 THERAPEUTIC ACTIVITIES: CPT | Mod: HCNC,CQ

## 2023-10-27 PROCEDURE — 27100171 HC OXYGEN HIGH FLOW UP TO 24 HOURS: Mod: HCNC

## 2023-10-27 PROCEDURE — 25000003 PHARM REV CODE 250: Mod: HCNC | Performed by: FAMILY MEDICINE

## 2023-10-27 PROCEDURE — 80069 RENAL FUNCTION PANEL: CPT | Mod: HCNC | Performed by: INTERNAL MEDICINE

## 2023-10-27 PROCEDURE — 97110 THERAPEUTIC EXERCISES: CPT | Mod: HCNC,CQ

## 2023-10-27 PROCEDURE — 25000242 PHARM REV CODE 250 ALT 637 W/ HCPCS: Mod: HCNC | Performed by: FAMILY MEDICINE

## 2023-10-27 PROCEDURE — 25000003 PHARM REV CODE 250: Mod: HCNC | Performed by: INTERNAL MEDICINE

## 2023-10-27 PROCEDURE — 94761 N-INVAS EAR/PLS OXIMETRY MLT: CPT | Mod: HCNC

## 2023-10-27 PROCEDURE — 94640 AIRWAY INHALATION TREATMENT: CPT | Mod: HCNC

## 2023-10-27 PROCEDURE — 99900035 HC TECH TIME PER 15 MIN (STAT): Mod: HCNC

## 2023-10-27 PROCEDURE — 80100016 HC MAINTENANCE HEMODIALYSIS: Mod: HCNC

## 2023-10-27 PROCEDURE — 63600175 PHARM REV CODE 636 W HCPCS: Mod: HCNC | Performed by: FAMILY MEDICINE

## 2023-10-27 RX ORDER — LORAZEPAM 0.5 MG/1
0.5 TABLET ORAL ONCE
Status: COMPLETED | OUTPATIENT
Start: 2023-10-27 | End: 2023-10-27

## 2023-10-27 RX ORDER — MIDODRINE HYDROCHLORIDE 5 MG/1
15 TABLET ORAL
Qty: 810 TABLET | Refills: 3 | Status: SHIPPED | OUTPATIENT
Start: 2023-10-27 | End: 2024-10-26

## 2023-10-27 RX ORDER — BISACODYL 5 MG
5 TABLET, DELAYED RELEASE (ENTERIC COATED) ORAL DAILY PRN
Qty: 90 TABLET | Refills: 0 | Status: SHIPPED | OUTPATIENT
Start: 2023-10-27 | End: 2024-01-25

## 2023-10-27 RX ORDER — BUSPIRONE HYDROCHLORIDE 10 MG/1
10 TABLET ORAL DAILY
Start: 2023-10-27

## 2023-10-27 RX ORDER — LORAZEPAM 0.5 MG/1
0.5 TABLET ORAL EVERY 6 HOURS PRN
Qty: 40 TABLET | Refills: 0 | Status: SHIPPED | OUTPATIENT
Start: 2023-10-27 | End: 2023-11-08

## 2023-10-27 RX ORDER — HYDROCODONE BITARTRATE AND ACETAMINOPHEN 10; 325 MG/1; MG/1
1 TABLET ORAL EVERY 6 HOURS PRN
Qty: 28 TABLET | Refills: 0 | Status: SHIPPED | OUTPATIENT
Start: 2023-10-27 | End: 2023-11-24

## 2023-10-27 RX ADMIN — NEPHROCAP 1 CAPSULE: 1 CAP ORAL at 08:10

## 2023-10-27 RX ADMIN — TRAMADOL HYDROCHLORIDE 50 MG: 50 TABLET, COATED ORAL at 12:10

## 2023-10-27 RX ADMIN — IPRATROPIUM BROMIDE AND ALBUTEROL SULFATE 3 ML: 2.5; .5 SOLUTION RESPIRATORY (INHALATION) at 07:10

## 2023-10-27 RX ADMIN — HYDROXYZINE HYDROCHLORIDE 25 MG: 25 TABLET ORAL at 12:10

## 2023-10-27 RX ADMIN — BUSPIRONE HYDROCHLORIDE 10 MG: 5 TABLET ORAL at 08:10

## 2023-10-27 RX ADMIN — MEGESTROL ACETATE 20 MG: 20 TABLET ORAL at 08:10

## 2023-10-27 RX ADMIN — IPRATROPIUM BROMIDE AND ALBUTEROL SULFATE 3 ML: 2.5; .5 SOLUTION RESPIRATORY (INHALATION) at 04:10

## 2023-10-27 RX ADMIN — TRAMADOL HYDROCHLORIDE 50 MG: 50 TABLET, COATED ORAL at 09:10

## 2023-10-27 RX ADMIN — IPRATROPIUM BROMIDE AND ALBUTEROL SULFATE 3 ML: 2.5; .5 SOLUTION RESPIRATORY (INHALATION) at 12:10

## 2023-10-27 RX ADMIN — LORAZEPAM 0.5 MG: 0.5 TABLET ORAL at 12:10

## 2023-10-27 RX ADMIN — HYDROCODONE BITARTRATE AND ACETAMINOPHEN 1 TABLET: 10; 325 TABLET ORAL at 03:10

## 2023-10-27 RX ADMIN — LORAZEPAM 0.5 MG: 0.5 TABLET ORAL at 09:10

## 2023-10-27 RX ADMIN — FLUTICASONE FUROATE AND VILANTEROL TRIFENATATE 1 PUFF: 200; 25 POWDER RESPIRATORY (INHALATION) at 07:10

## 2023-10-27 RX ADMIN — IPRATROPIUM BROMIDE AND ALBUTEROL SULFATE 3 ML: 2.5; .5 SOLUTION RESPIRATORY (INHALATION) at 03:10

## 2023-10-27 RX ADMIN — HEPARIN SODIUM 5000 UNITS: 5000 INJECTION INTRAVENOUS; SUBCUTANEOUS at 08:10

## 2023-10-27 RX ADMIN — SEVELAMER CARBONATE 800 MG: 800 TABLET, FILM COATED ORAL at 08:10

## 2023-10-27 RX ADMIN — EPOETIN ALFA-EPBX 10000 UNITS: 10000 INJECTION, SOLUTION INTRAVENOUS; SUBCUTANEOUS at 11:10

## 2023-10-27 RX ADMIN — MIDODRINE HYDROCHLORIDE 15 MG: 5 TABLET ORAL at 08:10

## 2023-10-27 NOTE — PROGRESS NOTES
[facility] - Telemetry  Nephrology  Progress Note    Patient Name: Katie Quevedo  MRN: [id number]  Admission Date: 10/12/2023  Hospital Length of Stay: 15 days  Attending Provider: Franchesca Tillman MD   Primary Care Physician: Kingston Verduzco MD  Principal Problem:Acute on chronic respiratory failure with hypoxia    Subjective:     HPI: No notes on file    Interval History:  Pt. Stable overnight - less anxious this am- on dialysis now- without any c/o.    Review of patient's allergies indicates:   Allergen Reactions    Aspirin      Other reaction(s): hx of ulcers    Tetracycline Swelling     Other reaction(s): Swelling    Penicillins Rash     Other reaction(s): Hives  Other reaction(s): Rash  Other reaction(s): Rash  Other reaction(s): Hives     Current Facility-Administered Medications   Medication Frequency    0.9%  NaCl infusion PRN    0.9%  NaCl infusion PRN    acetaminophen tablet 650 mg Q4H PRN    albumin human 25% bottle 25 g PRN    albuterol sulfate nebulizer solution 2.5 mg Q4H PRN    albuterol-ipratropium 2.5 mg-0.5 mg/3 mL nebulizer solution 3 mL Q4H    bisacodyL EC tablet 5 mg Daily PRN    busPIRone tablet 10 mg Once per day on Mon Wed Fri    carbamide peroxide 6.5 % otic solution 5 drop PRN    epoetin hemant-epbx injection 10,000 Units Every Mon, Wed, Fri    fluticasone furoate-vilanteroL 200-25 mcg/dose diskus inhaler 1 puff Daily    heparin (porcine) injection 4,100 Units PRN    heparin (porcine) injection 5,000 Units Q12H    HYDROcodone-acetaminophen  mg per tablet 1 tablet Q6H PRN    hydrOXYzine HCL tablet 25 mg TID PRN    LORazepam tablet 0.5 mg Q6H PRN    LORazepam tablet 0.5 mg Once    megestroL tablet 20 mg BID    melatonin tablet 6 mg Nightly PRN    midodrine tablet 15 mg TID WM    mirtazapine tablet 7.5 mg QHS    ondansetron injection 4 mg Q6H PRN    sevelamer carbonate tablet 800 mg TID WM    sodium chloride 0.9% bolus 250 mL 250 mL PRN    sodium chloride 0.9%  "bolus 250 mL 250 mL PRN    sodium chloride 0.9% bolus 250 mL 250 mL PRN    sodium chloride 0.9% bolus 250 mL 250 mL PRN    sodium chloride 0.9% bolus 250 mL 250 mL PRN    sodium chloride 0.9% bolus 250 mL 250 mL PRN    sodium chloride 0.9% flush 10 mL PRN    sodium chloride 0.9% flush 2 mL PRN    traMADoL tablet 50 mg BID PRN    vitamin renal formula (B-complex-vitamin c-folic acid) 1 mg per capsule 1 capsule Daily       Objective:     Vital Signs (Most Recent):  Temp: 97.9 °F (36.6 °C) (10/27/23 0750)  Pulse: (!) 118 (10/27/23 1216)  Resp: (!) 144 (10/27/23 0920)  BP: (!) 142/65 (10/27/23 0750)  SpO2: 99 % (10/27/23 0750) Vital Signs (24h Range):  Temp:  [97.7 °F (36.5 °C)-98.5 °F (36.9 °C)] 97.9 °F (36.6 °C)  Pulse:  [106-125] 118  Resp:  [] 144  SpO2:  [95 %-100 %] 99 %  BP: (102-164)/(56-65) 142/65     Weight: 43.2 kg (95 lb 3.8 oz) (10/26/23 2013)  Body mass index is 17.42 kg/m².  Body surface area is 1.37 meters squared.    I/O last 3 completed shifts:  In: 1670 [P.O.:1170; Other:500]  Out: 1501 [Other:1500; Stool:1]     Physical Exam  Vitals reviewed.   Constitutional:       Appearance: Normal appearance.   HENT:      Head: Normocephalic and atraumatic.   Cardiovascular:      Rate and Rhythm: Normal rate and regular rhythm.      Heart sounds: Normal heart sounds.      No friction rub.   Pulmonary:      Effort: Pulmonary effort is normal.      Breath sounds: Normal breath sounds.   Abdominal:      General: Bowel sounds are normal.      Palpations: Abdomen is soft.   Musculoskeletal:         General: No swelling.   Skin:     General: Skin is warm and dry.   Neurological:      General: No focal deficit present.      Mental Status: She is alert and oriented to person, place, and time.          Significant Labs:  Cardiac Markers: No results for input(s): "CKMB", "TROPONINT", "MYOGLOBIN" in the last 168 hours.  CBC:   Recent Labs   Lab 10/27/23  0417   WBC 7.59   RBC 2.74*   HGB 9.0*   HCT 28.2*   PLT " 204   *   MCH 32.8*   MCHC 31.9*     CMP:   Recent Labs   Lab 10/22/23  1755 10/24/23  1608 10/27/23  0417   *   < > 96   CALCIUM 8.9   < > 9.4   ALBUMIN 3.3*   < > 3.0*   PROT 7.1  --   --    *   < > 132*   K 4.2   < > 4.4   CO2 22*   < > 22*   CL 90*   < > 95   BUN 73*   < > 47*   CREATININE 8.7*   < > 6.9*   ALKPHOS 64  --   --    ALT 27  --   --    AST 16  --   --    BILITOT 0.5  --   --     < > = values in this interval not displayed.        Significant Imaging:  Labs: Reviewed  US: Reviewed    Assessment/Plan:     Renal/  ESRD (end stage renal disease) on dialysis    ESRD- Pt tolerated gentle HD treatment yesterday with only 1.5 liters fluid removal due to anxiety and SOB throughout the treatment.  Will plan for HD again in am.  Await outpt dialysis planning for d/c.  Repeat labs in am pre dialysis and titrate UF as tolerated.      10/27/2023- Pt tolerating HD - again today.   See flowsheet for treatment details.    Per case management and family- pt will d/c to home today after dialysis  and FU in her outpt HD unit to cont MWF dialysis schedule.        Thank you for your consult. I will sign off. Please contact us if you have any additional questions.    Lana Cutler MD  Nephrology  [place] - Telemetry

## 2023-10-27 NOTE — PT/OT/SLP PROGRESS
Physical Therapy Treatment    Patient Name:  Katie Quevedo   MRN:  [id number]    Recommendations:     Discharge Recommendations: Low Intensity Therapy  Discharge Equipment Recommendations: bedside commode  Barriers to discharge: None    Assessment:     Katie Quevedo is a 78 y.o. female admitted with a medical diagnosis of Acute on chronic respiratory failure with hypoxia.  She presents with the following impairments/functional limitations: weakness, gait instability, decreased coordination, impaired functional mobility, decreased lower extremity function, impaired cardiopulmonary response to activity.    Rehab Prognosis: Fair; patient would benefit from acute skilled PT services to address these deficits and reach maximum level of function.    Recent Surgery: * No surgery found *      Plan:     During this hospitalization, patient to be seen 3 x/week to address the identified rehab impairments via gait training, therapeutic activities, therapeutic exercises, neuromuscular re-education and progress toward the following goals:    Plan of Care Expires:  11/13/23    Subjective     Chief Complaint: Pt with no complaints or concerns at this time.   Patient/Family Comments/goals: Pt agreeable to PT treatment.   Pain/Comfort:  Pain Rating 1: 0/10      Objective:     Patient found HOB elevated with telemetry, peripheral IV, oxygen upon PT entry to room.     General Precautions: Standard, fall  Orthopedic Precautions: N/A  Braces: N/A       Functional Mobility:  Bed Mobility:     Supine to Sit: contact guard assistance  Sit to Supine: contact guard assistance  Transfers:     Sit to Stand:  contact guard assistance with rolling walker  Gait: Pt took 4-5 side steps to reach HOB, RW CGA.  Balance: Pt with good sitting and fair standing balance.       AM-PAC 6 CLICK MOBILITY  Turning over in bed (including adjusting bedclothes, sheets and blankets)?: 3  Sitting down on and standing up from a chair with arms (e.g., wheelchair, bedside  commode, etc.): 3  Moving from lying on back to sitting on the side of the bed?: 3  Moving to and from a bed to a chair (including a wheelchair)?: 3  Need to walk in hospital room?: 3  Climbing 3-5 steps with a railing?: 1  Basic Mobility Total Score: 16       Treatment & Education:    Pt performed B LE strengthening exercises x 30 reps seated at EOB:   LAQ's, ankle pumps, and seated hip flexion.       Patient left HOB elevated with all lines intact, call button in reach, and family members  present..    GOALS:   Multidisciplinary Problems       Physical Therapy Goals          Problem: Physical Therapy    Goal Priority Disciplines Outcome Goal Variances Interventions   Physical Therapy Goal     PT, PT/OT Ongoing, Progressing     Description: Goals to be met by: 23     Patient will increase functional independence with mobility by performin. Sit to stand transfer with Supervision  2. Bed to chair transfer with Supervision using RW or rollator  3. Gait  x 50 feet with Supervision using rollator or RW.   4. Lower extremity exercise program x10 reps per handout, with independence                         Time Tracking:     PT Received On: 10/27/23  PT Start Time: 910     PT Stop Time: 934  PT Total Time (min): 24 min     Billable Minutes: Therapeutic Activity 12 and Therapeutic Exercise 12    Treatment Type: Treatment  PT/PTA: PTA     Number of PTA visits since last PT visit: 1     10/27/2023

## 2023-10-27 NOTE — PROGRESS NOTES
[facility] - Telemetry  Adult Nutrition  Progress Note    SUMMARY       Recommendations    Recommendation:  1. Continue to encourage intake of meals & supplement as tolerated.   2. Monitor weight/labs.   3. RD to continue to follow to monitor po intake    Goals:  Pt will tolerate diet with at least 50% intake at meals by RD follow up  Nutrition Goal Status: progressing towards goal  Communication of RD Recs: reviewed with RN    Assessment and Plan  Nutrition Problem  Altered Nutrition Related Lab Values     Related to (etiology):   Dx/hx     Signs and Symptoms (as evidenced by):   Na 127L, K 5.4H, BUN 55H, Crea 5.3H, Ca 8.0L, Alb 2.7L      Interventions:  Collaboration with other providers     Nutrition Diagnosis Status:   Continues     Malnutrition Assessment  Weight Loss (Malnutrition):  (4% x 5 months)   Upper Arm Region (Subcutaneous Fat Loss): mild depletion  Thoracic and Lumbar Region: mild depletion   Clavicle Bone Region (Muscle Loss): mild depletion  Patellar Region (Muscle Loss): mild depletion  Anterior Thigh Region (Muscle Loss): mild depletion  Posterior Calf Region (Muscle Loss): mild depletion       Reason for Assessment  Reason For Assessment: RD follow-up  Diagnosis:  (SOB)  Relevant Medical History: COPD, osteoporosis, HTN, bilateral renal cysts, PE, CKD, seziures, CHF, lumbar laminectomy, hysterectomy, thrombectomy  General Information Comments: Pt remains on Cardiac Renal 1000ml fluid diet with Novasource Renal. Noted pt with fair intake at meals. No recent weight loss noted. Pt continues with difficulty tolerating HD> Ernesto 19-skin intact. NFPE completed 10/18- mild wasting of legs, clavicles, thoracic region, & arms.  Nutrition Discharge Planning: pt to d/c on Cardiac Renal diet    Nutrition Risk Screen  Nutrition Risk Screen: no indicators present    Nutrition/Diet History  Food Preferences: no Christian or cultural food prefs identified  Spiritual, Cultural Beliefs, Mu-ism Practices, Values  "that Affect Care: no  Factors Affecting Nutritional Intake: None identified at this time    Anthropometrics  Temp: 98.4 °F (36.9 °C)  Height Method: Stated  Height: 5' 2" (157.5 cm)  Height (inches): 62 in  Weight Method: Bed Scale  Weight: 43.2 kg (95 lb 3.8 oz)  Weight (lb): 95.24 lb  Ideal Body Weight (IBW), Female: 110 lb  % Ideal Body Weight, Female (lb): 86.58 %  BMI (Calculated): 17.4  BMI Grade: 17 - 18.4 protein-energy malnutrition grade I  Usual Body Weight (UBW), k.4 kg ()  % Usual Body Weight: 102.1  % Weight Change From Usual Weight: 1.89 %     Lab/Procedures/Meds  Pertinent Labs Reviewed: reviewed  Pertinent Labs Comments: Na 132L, BUN 32H, Crea 4.8H, Alb 3.1L  Pertinent Medications Reviewed: reviewed  Pertinent Medications Comments: heparin, Megace, renal vitamin    Estimated/Assessed Needs  Weight Used For Calorie Calculations: 43.2 kg (95 lb 3.8 oz)  Energy Calorie Requirements (kcal): 1512 (35 kcal/kg)  Energy Need Method: Kcal/kg  Protein Requirements: 51g (1.2g/kg)  Weight Used For Protein Calculations: 43.2 kg (95 lb 3.8 oz)  Estimated Fluid Requirement Method: RDA Method  RDA Method (mL): 1512     Nutrition Prescription Ordered  Current Diet Order: Cardiac Renal 1000ml fluid  Oral Nutrition Supplement: Novasource Renal    Evaluation of Received Nutrient/Fluid Intake  I/O: 1050/1500  Energy Calories Required: not meeting needs  Protein Required: not meeting needs  Fluid Required: not meeting needs  Comments: LBM 10/24  % Intake of Estimated Energy Needs: 25 - 50 %  % Meal Intake: 25 - 50 %    Nutrition Risk  Level of Risk/Frequency of Follow-up:  (2xweekly)     Monitor and Evaluation  Food and Nutrient Intake: food and beverage intake  Food and Nutrient Adminstration: diet order  Physical Activity and Function: nutrition-related ADLs and IADLs  Anthropometric Measurements: weight  Biochemical Data, Medical Tests and Procedures: electrolyte and renal panel  Nutrition-Focused Physical " Findings: overall appearance     Nutrition Follow-Up  RD Follow-up?: Yes

## 2023-10-27 NOTE — PLAN OF CARE
Recommendation:  1. Continue to encourage intake of meals & supplement as tolerated.   2. Monitor weight/labs.   3. RD to continue to follow to monitor po intake    Goals:  Pt will tolerate diet with at least 50% intake at meals by RD follow up  Nutrition Goal Status: progressing towards goal

## 2023-10-27 NOTE — PLAN OF CARE
"  Problem: Adult Inpatient Plan of Care  Goal: Plan of Care Review  Outcome: Ongoing, Progressing     Problem: Adult Inpatient Plan of Care  Goal: Optimal Comfort and Wellbeing  Outcome: Ongoing, Progressing     Problem: Respiratory Compromise (Pneumonia)  Goal: Effective Oxygenation and Ventilation  Outcome: Ongoing, Progressing     Problem: Hemodynamic Instability (Hemodialysis)  Goal: Effective Tissue Perfusion  Outcome: Ongoing, Progressing     Problem: Fluid Volume Excess  Goal: Fluid Balance  Outcome: Ongoing, Progressing     .Plan of care reviewed with the patient. Scheduled medicines given and the patient tolerated well. Given Hydrocodone-acetaminophen  two doses, Tylenol 650 mg and Tramadol 50 mg PO for Shoulder pain and Back pain, also given Hydroxyzine Hcl 25 mg for itching. Fall and safety precautions taken and the standard interventions are in place. On Telemetry monitoring with Sinus Tachycardia, no true "red alarms' noted. On 4 L Oxygen and satting well. Advised the patient to call for the assistance. Continued monitoring the patient.   "

## 2023-10-27 NOTE — NURSING
Rapid Response Nurse Follow-up Note     Followed up with patient for proactive rounding.   No acute issues at this time. Reviewed plan of care with charge RN, Luisa .   Team will continue to follow.  Please call Rapid Response RN, Ivelisse Maria RN with any questions or concerns at [phone].

## 2023-10-27 NOTE — NURSING
RAPID RESPONSE NURSE PROACTIVE ROUNDING NOTE         Admit Date: 10/12/2023  LOS: 14  Code Status: DNR   Date of Visit: 10/26/2023  : 1945  Age: 78 y.o.  Sex: female  Race: White  Bed: K472/K472 A:   MRN: [id number]  Was the patient discharged from an ICU this admission? No   Was the patient discharged from a PACU within last 24 hours? No   Did the patient receive conscious sedation/general anesthesia in last 24 hours? No   Was the patient in the ED within the past 24 hours? No   Was the patient on NIPPV within the past 24 hours? No   Attending Physician: Franchesca Tillman MD  Primary Service: Internal Medicine,Hospitalist       SITUATION      Diagnosis: Acute on chronic respiratory failure with hypoxia   has a past medical history of Acute congestive heart failure, Anemia, Bilateral renal cysts, Cataract, Chronic LBP, Chronic pain, CKD (chronic kidney disease), stage IV, Colon polyp, COPD (chronic obstructive pulmonary disease), Dehydration, Encounter for blood transfusion, HTN (hypertension), Lumbar spondylosis, Melanoma, Metabolic bone disease, Migraines, neuralgic, Osteoporosis, Primary osteoarthritis of both knees, Pulmonary embolism with infarction, Seizures, Subdeltoid bursitis, L>R., Ulcer, and Vitamin D deficiency disease.    Last Vitals:  Temp: 98.4 °F (36.9 °C) (10/26 2013)  Pulse: 120 (10/26 2013)  Resp: 18 (10/26 2022)  BP: 121/60 (10/26 2013)  SpO2: 97 % (10/26 2013)    24 Hour Vitals Range:  Temp:  [98.2 °F (36.8 °C)-98.5 °F (36.9 °C)]   Pulse:  [102-120]   Resp:  [17-24]   BP: ()/(53-73)   SpO2:  [92 %-100 %]     Chart reviewed, pt noted to be tachycardic. Pt is complaining of pain, hydrocodone given at . BP stable, will continue to monitor for hypotension. No interventions necessary at this time.    FOLLOW UP    Call back the Rapid Response NurseBeltran at [phone] for additional questions or concerns.

## 2023-10-27 NOTE — PROGRESS NOTES
10/27/23 1400   Post-Hemodialysis Assessment   Blood Volume Processed (Liters) 54.8 L   Dialyzer Clearance Lightly streaked   Duration of Treatment 180 minutes   Additional Fluid Intake (mL) 500 mL   Total UF (mL) 1500 mL   Net Fluid Removal 1000   Patient Response to Treatment toleraated txment well, met goal   Post-Hemodialysis Comments Pt awake, no apparent distress,

## 2023-10-27 NOTE — PT/OT/SLP PROGRESS
Occupational Therapy      Patient Name:  Katie Quevedo   MRN:  [id number]    Patient not seen today secondary to Dialysis (PM 1252:). Expected discharge home after dialysis per chart.      10/27/2023

## 2023-10-27 NOTE — PLAN OF CARE
spoke with Dalton at [facility]. She confirmed with her manager they can take her back as their dialysis patient. Patient will resume her MWF schedule as before. I told them plans for discharge today.    [facility] on [address] (P) [phone] (F) [phone]     spoke with daughter Chintan. Chintan was advised patient can go back to her HD facility. She asked about switching facilities, however, I told her  at the HD facility can assist her with that. Daughter also asked about wheelchair for home use. Per [employer], This patient has a motorized power chair she will not qualify for any ambulatory items.Daughter was updated as well. Also, per Marely at [facility]  called stating her oxygen needs to be replaced. Per Marely, Her oxygen is due for recert 5 year renew . The  called stating he wanted the unit replaced before she is discharge. We advised him that she has to get new orders notes and testing to have the oxygen replace. So not sure if he mentioned it just in case he does this is the issue with her home oxygen. I spoke with daughter and I told her concentrator is what she should be using at the house. Daughter stated she does have an over the shoulder tank that patient pays out of pocket. Marely stated that patient just received tank refills on 9/23 so she should have tanks. I asked her if they ever called to say concentrator was not working, but she did not see any calls until today. Marely stated they cannot come out to house and even check it. They do send letters to patient's if oxygen needs to be reordered/recertified. LUPILLO updated  supervisor Darrian.      Daughter Chintan is aware and agreeable to Home Health with [facility] with [facility] ([facility]). Teresa updated as well to reach out to family. Home Health orders need to say Home Health with [facility]. Patient will also need ambulance home.    Message left for  Teresa with [facility] of discharge today and orders will be placed.  Kalyan with [facility] also updated.     [facility] will go out to service oxygen tank to ensure patient has oxygen at home and  will set up transport to home.    Patient got a commode last year, cannot qualify. Spoke with Marely.She received a commode in 2022 and she already discuss that with Zainab Sorensen aware of how orders need to be written for HH and AIM.    Patient choice form signed.      Patient Contacts    Name Relation Home Work Mobile   Dre Quevedo Spouse [phone]  [phone]   Nevaeh Quevedo   [phone]   Chintan Stack Daughter   [phone]       Future Appointments   Date Time Provider Department Center   11/2/2023 10:00 AM Pushpa Mcmahon MD [facility] tamara   11/6/2023  3:30 PM Giancarlo Rust MD [facility]      [facility] Provider [phone] [address] [facility] [address] [facility] [address]      Next Steps: Follow up  Appointment:   Instructions:     [facility]   [phone] [address] [facility] [facility] [address]     Next Steps: Follow up  Appointment:   Instructions: Home Health [facility]    [facility] [phone] [facility] [address] [place] [address]     Next Steps: Follow up  Appointment:   Instructions: AIM PROGRAM      10/27/23 1550   Final Note   Assessment Type Final Discharge Note   Anticipated Discharge Disposition Home-Health   Hospital Resources/Appts/Education Provided Appointments scheduled and added to AVS   Post-Acute Status   Post-Acute Authorization Home Health  (HH with [facility]IM)   Home Health Status   (Pending orders)   Discharge Delays   (Orders/Transport)      10/27/23 1428   Discharge Reassessment   Assessment Type Discharge Planning  Reassessment   Discharge Plan discussed with: Patient;Adult children   Discharge Plan A Home with family;Home Health  ([facility]IM Services)   Discharge Plan B Home with family   DME Needed Upon Discharge  none  (SEE NOTE)   Transition of Care Barriers None   Why the patient remains in the hospital Requires continued medical care   Post-Acute Status   Post-Acute Authorization Home Health   Home Health Status Pending medical clearance/testing   Hospital Resources/Appts/Education Provided Appointments scheduled and added to AVS   Discharge Delays None known at this time     Tiffany Kim RN    [phone]

## 2023-10-27 NOTE — TELEPHONE ENCOUNTER
Received a message from Mrs Quevedo's daughter that Mrs Quevedo is in [facility] and needs oxygen to go home. I left a voicemail message that the  usually takes care of that and to speak  to the nurse if no  is available to assist. Virginia Galvin

## 2023-10-27 NOTE — TELEPHONE ENCOUNTER
----- Message from Meaghan Boateng sent at 10/27/2023  2:07 PM CDT -----  Regarding: Oxygen  Contact: Pt's daughter @ [phone]  Pt's daughter is calling to because pt need oxygen to go home with. Pt is in the hospital

## 2023-10-27 NOTE — PLAN OF CARE
I entered this note 10/25/23 on another pt's chart:         I faxed progress notes, H&P, dialysis flowsheets, labs to [facility] [address] [facility] (P) [phone] (F) [phone].  They will review with their physician and call me back.          10/26/23 1237   Post-Acute Status   Post-Acute Authorization Dialysis   Diaylsis Status Pending medical clearance/testing   Discharge Plan   Discharge Plan A Home with family   Discharge Plan B Home Health         Darrian Guerrero, RN   Supervisor Case Management[facility]  [phone]

## 2023-10-27 NOTE — PLAN OF CARE
Patient Contacts    Name Relation Home Work Mobile   Dre Quevedo Spouse [phone]   Nevaeh Quevedo Grandchild   [phone]   Chintan Stack Daughter   [phone]     Future Appointments   Date Time Provider Department Center   11/2/2023 10:00 AM [facility]   11/6/2023  3:30 PM [facility] Rust [facility]    [phone]

## 2023-10-27 NOTE — SUBJECTIVE & OBJECTIVE
Interval History:  Pt. Stable overnight - less anxious this am- on dialysis now- without any c/o.    Review of patient's allergies indicates:   Allergen Reactions    Aspirin      Other reaction(s): hx of ulcers    Tetracycline Swelling     Other reaction(s): Swelling    Penicillins Rash     Other reaction(s): Hives  Other reaction(s): Rash  Other reaction(s): Rash  Other reaction(s): Hives     Current Facility-Administered Medications   Medication Frequency    0.9%  NaCl infusion PRN    0.9%  NaCl infusion PRN    acetaminophen tablet 650 mg Q4H PRN    albumin human 25% bottle 25 g PRN    albuterol sulfate nebulizer solution 2.5 mg Q4H PRN    albuterol-ipratropium 2.5 mg-0.5 mg/3 mL nebulizer solution 3 mL Q4H    bisacodyL EC tablet 5 mg Daily PRN    busPIRone tablet 10 mg Once per day on Mon Wed Fri    carbamide peroxide 6.5 % otic solution 5 drop PRN    epoetin hemant-epbx injection 10,000 Units Every Mon, Wed, Fri    fluticasone furoate-vilanteroL 200-25 mcg/dose diskus inhaler 1 puff Daily    heparin (porcine) injection 4,100 Units PRN    heparin (porcine) injection 5,000 Units Q12H    HYDROcodone-acetaminophen  mg per tablet 1 tablet Q6H PRN    hydrOXYzine HCL tablet 25 mg TID PRN    LORazepam tablet 0.5 mg Q6H PRN    LORazepam tablet 0.5 mg Once    megestroL tablet 20 mg BID    melatonin tablet 6 mg Nightly PRN    midodrine tablet 15 mg TID WM    mirtazapine tablet 7.5 mg QHS    ondansetron injection 4 mg Q6H PRN    sevelamer carbonate tablet 800 mg TID WM    sodium chloride 0.9% bolus 250 mL 250 mL PRN    sodium chloride 0.9% bolus 250 mL 250 mL PRN    sodium chloride 0.9% bolus 250 mL 250 mL PRN    sodium chloride 0.9% bolus 250 mL 250 mL PRN    sodium chloride 0.9% bolus 250 mL 250 mL PRN    sodium chloride 0.9% bolus 250 mL 250 mL PRN    sodium chloride 0.9% flush 10 mL PRN    sodium chloride 0.9% flush 2 mL PRN    traMADoL tablet 50 mg BID PRN    vitamin renal formula (B-complex-vitamin c-folic acid) 1  "mg per capsule 1 capsule Daily       Objective:     Vital Signs (Most Recent):  Temp: 97.9 °F (36.6 °C) (10/27/23 0750)  Pulse: (!) 118 (10/27/23 1216)  Resp: (!) 144 (10/27/23 0920)  BP: (!) 142/65 (10/27/23 0750)  SpO2: 99 % (10/27/23 0750) Vital Signs (24h Range):  Temp:  [97.7 °F (36.5 °C)-98.5 °F (36.9 °C)] 97.9 °F (36.6 °C)  Pulse:  [106-125] 118  Resp:  [] 144  SpO2:  [95 %-100 %] 99 %  BP: (102-164)/(56-65) 142/65     Weight: 43.2 kg (95 lb 3.8 oz) (10/26/23 2013)  Body mass index is 17.42 kg/m².  Body surface area is 1.37 meters squared.    I/O last 3 completed shifts:  In: 1670 [P.O.:1170; Other:500]  Out: 1501 [Other:1500; Stool:1]     Physical Exam  Vitals reviewed.   Constitutional:       Appearance: Normal appearance.   HENT:      Head: Normocephalic and atraumatic.   Cardiovascular:      Rate and Rhythm: Normal rate and regular rhythm.      Heart sounds: Normal heart sounds.      No friction rub.   Pulmonary:      Effort: Pulmonary effort is normal.      Breath sounds: Normal breath sounds.   Abdominal:      General: Bowel sounds are normal.      Palpations: Abdomen is soft.   Musculoskeletal:         General: No swelling.   Skin:     General: Skin is warm and dry.   Neurological:      General: No focal deficit present.      Mental Status: She is alert and oriented to person, place, and time.          Significant Labs:  Cardiac Markers: No results for input(s): "CKMB", "TROPONINT", "MYOGLOBIN" in the last 168 hours.  CBC:   Recent Labs   Lab 10/27/23  0417   WBC 7.59   RBC 2.74*   HGB 9.0*   HCT 28.2*      *   MCH 32.8*   MCHC 31.9*     CMP:   Recent Labs   Lab 10/22/23  1755 10/24/23  1608 10/27/23  0417   *   < > 96   CALCIUM 8.9   < > 9.4   ALBUMIN 3.3*   < > 3.0*   PROT 7.1  --   --    *   < > 132*   K 4.2   < > 4.4   CO2 22*   < > 22*   CL 90*   < > 95   BUN 73*   < > 47*   CREATININE 8.7*   < > 6.9*   ALKPHOS 64  --   --    ALT 27  --   --    AST 16  --   --  "   BILITOT 0.5  --   --     < > = values in this interval not displayed.        Significant Imaging:  Labs: Reviewed  US: Reviewed

## 2023-10-27 NOTE — PLAN OF CARE
Introduced as VN and will be reviewing discharge instructions.  Educated patient and daughter on reason for admission, home medication list, and discharge instructions including when to return to ED and the following doctor appointments.  Education per flowsheet.  Opportunity given for questions and questions answered.  Nurse notified of   completion of discharge education. Patient is waiting for transport

## 2023-10-28 NOTE — PLAN OF CARE
[place] - Telemetry      HOME HEALTH ORDERS  FACE TO FACE ENCOUNTER    Patient Name: Katie Quevedo  YOB: 1945    PCP: Kingston Verduzco MD   PCP Address: [address] / [place] [address]  PCP Phone Number: [phone]  PCP Fax: [phone]    Encounter Date: 10/12/23    Admit to Home Health with Palliative AIM    Diagnoses:  Active Hospital Problems    Diagnosis  POA    *Acute on chronic respiratory failure with hypoxia [J96.21]  Yes    Metabolic acidosis [E87.20]  Yes     Metabolic acidosis- stable- cont to monitor labs and add oral bicarbonate supp as needed to correct.      Frailty syndrome in geriatric patient [R54]  Yes    Hyperkalemia [E87.5]  Yes    Community acquired pneumonia of left lower lobe of lung [J18.9]  Yes    Goals of care, counseling/discussion [Z71.89]  Not Applicable    Chronic obstructive pulmonary disease with acute exacerbation [J44.1]  Yes    Secondary hyperparathyroidism [N25.81]  Yes    ESRD (end stage renal disease) on dialysis [N18.6, Z99.2]  Not Applicable     Chronic       ESRD- Pt tolerated gentle HD treatment yesterday with only 1.5 liters fluid removal due to anxiety and SOB throughout the treatment.  Will plan for HD again in am.  Await outpt dialysis planning for d/c.  Repeat labs in am pre dialysis and titrate UF as tolerated.      10/27/2023- Pt tolerating HD - again today.   See flowsheet for treatment details.    Per case management and family- pt will d/c to home today after dialysis  and FU in her outpt HD unit to cont MWF dialysis schedule.      Chronic respiratory failure with hypoxia, on home O2 therapy [J96.11, Z99.81]  Not Applicable     Chronic     Home Oxygen    Face to face visit with pt regarding their home oxygen.  We have discussed the need for oxygen including continuous use, prn use or night time use (as appropriate for the pt).  We discussed the need for compliance with the therapy. We will do recertifications as necessary.          Shortness of breath [R06.02]  Yes     Recommend evaluation with CXR, BNP, CBC, & echo.        Resolved Hospital Problems   No resolved problems to display.       Follow Up Appointments:  Future Appointments   Date Time Provider Department Center   11/2/2023 10:00 AM Pushpa Mcmahon MD [facility] PHYSMED [address]   11/6/2023  3:30 PM Giancarlo Rust MD [facility] PULMSVC [address]       Allergies:  Review of patient's allergies indicates:   Allergen Reactions    Aspirin      Other reaction(s): hx of ulcers    Tetracycline Swelling     Other reaction(s): Swelling    Penicillins Rash     Other reaction(s): Hives  Other reaction(s): Rash  Other reaction(s): Rash  Other reaction(s): Hives       Medications: Review discharge medications with patient and family and provide education.    No current facility-administered medications for this encounter.     Current Outpatient Medications   Medication Sig Dispense Refill    albuterol (PROVENTIL/VENTOLIN HFA) 90 mcg/actuation inhaler Inhale 2 puffs into the lungs every 6 (six) hours as needed for Wheezing. Rescue 18 g 11    albuterol-ipratropium (DUO-NEB) 2.5 mg-0.5 mg/3 mL nebulizer solution Take 3 mLs by nebulization every 6 (six) hours as needed for Shortness of Breath. Rescue 270 mL 11    azelastine (ASTELIN) 137 mcg (0.1 %) nasal spray 1 spray (137 mcg total) by Nasal route 2 (two) times daily as needed for Rhinitis.      B complex-vitamin C-folic acid (RENAL-RADHA) 0.8 mg Tab Take 0.8 mg by mouth every Mon, Wed, Fri.      bisacodyL (DULCOLAX) 5 mg EC tablet Take 1 tablet (5 mg total) by mouth daily as needed for Constipation. 90 tablet 0    busPIRone (BUSPAR) 10 MG tablet Take 1 tablet (10 mg total) by mouth once daily. On Mondays, Wednesdays, and Fridays      cyproheptadine (PERIACTIN) 4 mg tablet Take 1 tablet by mouth 3 (three) times daily as needed.      diclofenac sodium (VOLTAREN) 1 % Gel Apply 2 g topically 3 (three) times daily as needed.       fluticasone-umeclidin-vilanter (TRELEGY ELLIPTA) 200-62.5-25 mcg inhaler Inhale 1 puff into the lungs once daily. 60 each 11    furosemide (LASIX) 80 MG tablet Take 1 tablet (80 mg total) by mouth 2 (two) times daily On non dialysis days Tuesday-Thursday- Saturday -Sunday 60 tablet 11    HYDROcodone-acetaminophen (NORCO)  mg per tablet Take 1 tablet by mouth 3 (three) times daily as needed for Pain. 90 tablet 0    HYDROcodone-acetaminophen (NORCO)  mg per tablet Take 1 tablet by mouth every 6 (six) hours as needed for Pain. 28 tablet 0    LORazepam (ATIVAN) 0.5 MG tablet Take 1 tablet (0.5 mg total) by mouth every 6 (six) hours as needed for Anxiety (use 1-2 before dialysis for anxiety, bedtime). 40 tablet 0    megestroL (MEGACE) 20 MG Tab Take 1 tablet (20 mg total) by mouth 2 (two) times daily. 60 tablet 11    midodrine (PROAMATINE) 5 MG Tab Take 3 tablets (15 mg total) by mouth 3 (three) times daily with meals. 810 tablet 3    mirtazapine (REMERON) 7.5 MG Tab Take 1 tablet (7.5 mg total) by mouth every evening. 30 tablet 11    ondansetron (ZOFRAN) 4 mg/5 mL solution Take 5 mLs (4 mg total) by mouth 2 (two) times daily as needed for Nausea. 50 mL 0    sevelamer carbonate (RENVELA) 800 mg Tab Take 1 tablet by mouth 3 times a day 90 tablet 12    vitamin renal formula, B-complex-vitamin c-folic acid, (NEPHROCAP) 1 mg Cap Take 1 capsule by mouth once daily. 90 capsule 3     Discharge Medication List as of 10/27/2023  4:49 PM        START taking these medications    Details   bisacodyL (DULCOLAX) 5 mg EC tablet Take 1 tablet (5 mg total) by mouth daily as needed for Constipation., Starting Fri 10/27/2023, Until Thu 1/25/2024 at 2359, Normal      !! HYDROcodone-acetaminophen (NORCO)  mg per tablet Take 1 tablet by mouth every 6 (six) hours as needed for Pain., Starting Fri 10/27/2023, Until Fri 11/24/2023 at 2359, Print      vitamin renal formula, B-complex-vitamin c-folic acid, (NEPHROCAP) 1 mg Cap  Take 1 capsule by mouth once daily., Starting Sat 10/28/2023, Until Sun 10/27/2024, Normal       !! - Potential duplicate medications found. Please discuss with provider.        CONTINUE these medications which have CHANGED    Details   busPIRone (BUSPAR) 10 MG tablet Take 1 tablet (10 mg total) by mouth once daily. On Mondays, Wednesdays, and Fridays, Starting Fri 10/27/2023, No Print      LORazepam (ATIVAN) 0.5 MG tablet Take 1 tablet (0.5 mg total) by mouth every 6 (six) hours as needed for Anxiety (use 1-2 before dialysis for anxiety, bedtime)., Starting Fri 10/27/2023, Until Wed 11/8/2023 at 2359, Print      midodrine (PROAMATINE) 5 MG Tab Take 3 tablets (15 mg total) by mouth 3 (three) times daily with meals., Starting Fri 10/27/2023, Until Sat 10/26/2024, Normal           CONTINUE these medications which have NOT CHANGED    Details   albuterol (PROVENTIL/VENTOLIN HFA) 90 mcg/actuation inhaler Inhale 2 puffs into the lungs every 6 (six) hours as needed for Wheezing. Rescue, Starting Thu 6/29/2023, Normal      albuterol-ipratropium (DUO-NEB) 2.5 mg-0.5 mg/3 mL nebulizer solution Take 3 mLs by nebulization every 6 (six) hours as needed for Shortness of Breath. Rescue, Starting Thu 6/29/2023, Until Fri 6/28/2024 at 2359, Normal      azelastine (ASTELIN) 137 mcg (0.1 %) nasal spray 1 spray (137 mcg total) by Nasal route 2 (two) times daily as needed for Rhinitis., Starting Sun 10/1/2023, Until Mon 9/30/2024 at 2359, No Print      B complex-vitamin C-folic acid (RENAL-RADHA) 0.8 mg Tab Take 0.8 mg by mouth every Mon, Wed, Fri., Starting Mon 8/28/2023, Historical Med      cyproheptadine (PERIACTIN) 4 mg tablet Take 1 tablet by mouth 3 (three) times daily as needed., Starting Mon 8/28/2023, Historical Med      diclofenac sodium (VOLTAREN) 1 % Gel Apply 2 g topically 3 (three) times daily as needed., Historical Med      fluticasone-umeclidin-vilanter (TRELEGY ELLIPTA) 200-62.5-25 mcg inhaler Inhale 1 puff into the  lungs once daily., Starting Thu 6/29/2023, Normal      furosemide (LASIX) 80 MG tablet Take 1 tablet (80 mg total) by mouth 2 (two) times daily On non dialysis days Tuesday-Thursday- Saturday -Sunday, Starting Sat 12/17/2022, Until Sun 12/17/2023, Normal      !! HYDROcodone-acetaminophen (NORCO)  mg per tablet Take 1 tablet by mouth 3 (three) times daily as needed for Pain., Starting Tue 9/26/2023, Until Sun 10/29/2023 at 2359, Normal      megestroL (MEGACE) 20 MG Tab Take 1 tablet (20 mg total) by mouth 2 (two) times daily., Starting Wed 8/23/2023, Until Thu 8/22/2024, Normal      mirtazapine (REMERON) 7.5 MG Tab Take 1 tablet (7.5 mg total) by mouth every evening., Starting Tue 6/6/2023, Until Wed 6/5/2024, Normal      ondansetron (ZOFRAN) 4 mg/5 mL solution Take 5 mLs (4 mg total) by mouth 2 (two) times daily as needed for Nausea., Starting Mon 10/9/2023, Normal      sevelamer carbonate (RENVELA) 800 mg Tab Take 1 tablet by mouth 3 times a day, Starting Wed 7/26/2023, Normal       !! - Potential duplicate medications found. Please discuss with provider.        STOP taking these medications       doxercalciferol (HECTOROL IV) Comments:   Reason for Stopping:         predniSONE (DELTASONE) 20 MG tablet Comments:   Reason for Stopping:         sodium chloride 7% 7 % nebulizer solution Comments:   Reason for Stopping:                 I have seen and examined this patient within the last 30 days. My clinical findings that support the need for the home health skilled services and home bound status are the following:no   Weakness/numbness causing balance and gait disturbance due to Weakness/Debility making it taxing to leave home.     Diet:   renal diet    Labs:  N/a    Referrals/ Consults  Physical Therapy to evaluate and treat. Evaluate for home safety and equipment needs; Establish/upgrade home exercise program. Perform / instruct on therapeutic exercises, gait training, transfer training, and Range of  Motion.  Occupational Therapy to evaluate and treat. Evaluate home environment for safety and equipment needs. Perform/Instruct on transfers, ADL training, ROM, and therapeutic exercises.  Aide to provide assistance with personal care, ADLs, and vital signs.    Activities:   activity as tolerated    Nursing:   Agency to admit patient within 24 hours of hospital discharge unless specified on physician order or at patient request    SN to complete comprehensive assessment including routine vital signs. Instruct on disease process and s/s of complications to report to MD. Review/verify medication list sent home with the patient at time of discharge  and instruct patient/caregiver as needed. Frequency may be adjusted depending on start of care date.     Skilled nurse to perform up to 3 visits PRN for symptoms related to diagnosis    Notify MD if SBP > 160 or < 90; DBP > 90 or < 50; HR > 120 or < 50; Temp > 101; O2 < 88%; Other:       Ok to schedule additional visits based on staff availability and patient request on consecutive days within the home health episode.    When multiple disciplines ordered:    Start of Care occurs on Sunday - Wednesday schedule remaining discipline evaluations as ordered on separate consecutive days following the start of care.    Thursday SOC -schedule subsequent evaluations Friday and Monday the following week.     Friday - Saturday SOC - schedule subsequent discipline evaluations on consecutive days starting Monday of the following week.    For all post-discharge communication and subsequent orders please contact patient's primary care physician. If unable to reach primary care physician or do not receive response within 30 minutes, please contact Clemencia Gambino for clinical staff order clarification    Miscellaneous   Routine Skin for Bedridden Patients: Instruct patient/caregiver to apply moisture barrier cream to all skin folds and wet areas in perineal area daily and after baths and  all bowel movements.  Home Oxygen:  No change    Home Health Aide:  Nursing Three times weekly, Physical Therapy Twice weekly, Occupational Therapy Twice weekly, Respiratory Therapy Twice weekly, and Home Health Aide Three times weekly    Wound Care Orders  no    I certify that this patient is confined to her home and needs intermittent skilled nursing care, physical therapy, and occupational therapy.

## 2023-10-30 NOTE — DISCHARGE SUMMARY
[facility] Discharge Summary    Attending Physician: Primo    Date of Admit: 10/12/2023  Date of Discharge: 10/27/2023    Discharge to: Home with AIM  Condition: Stable    Discharge Diagnoses     ESRD on HD  Frailty   End stage COPD    Consultants and Procedures     Consultants:  Nephrology, Pulmonary    Procedures:   HD    Brief History of Present Illness      Katie Quevedo is a 78 y.o.  female who  has a past medical history of Acute congestive heart failure (02/10/2020), Anemia, Bilateral renal cysts, Cataract, Chronic LBP (7/26/2012), Chronic pain, CKD (chronic kidney disease), stage IV, Colon polyp (2013), COPD (chronic obstructive pulmonary disease), Dehydration, Encounter for blood transfusion, HTN (hypertension), Lumbar spondylosis, Melanoma, Metabolic bone disease, Migraines, neuralgic, Osteoporosis, Primary osteoarthritis of both knees, Pulmonary embolism with infarction, Seizures (1972), Subdeltoid bursitis, L>R. (9/27/2012), Ulcer, and Vitamin D deficiency disease.. The patient presented to [facility] on 10/12/2023 with a primary complaint of Shortness of Breath (Pt presents to the ED for sob. Pt presents on a neb mask at 8lpm .  Pt has been hospitalized recently for pneumonia)     The patient was in their usual state of health until 2 days ago when she was discharge from [facility] for respiratory failure 2/2 pneumonia, COPD. She has had 2 other hospital admissions in the last 2 weeks. She was barely able to finish her HD session yesterday. Spoke with Dr. Montemayor, she has been struggling to complete HD sessions over the last 6 weeks and has becoming more and more frail and tachycardic on the machine and there is some concern she will not be able to continue with dialysis. She asked to come to the ER today for worsening shortness of breath. She does not have BiPAP at home. Daughter has not observed any issues with swallowing or episodes concerning for aspiration. She is  on chronic opiates, unclear if patient has been more sedated than usual at home.    For the full HPI please refer to the History & Physical from this admission.    Hospital Course By Problem with Pertinent Findings     Goals of Care Discussion       Family requested an attempt of HD today, performed in HD unit today       HD performed with 1 L removed. Afterwards had an episode of anxiety with O2 desaturation to 83% and was placed on BiPAP then placed on 5-6HF       Plan for hospice with HD, working with [facility] to arrange this as an outpatient. Palliative, Nephrology and CM working on this     ESRD (end stage renal disease) on dialysis   Nephrology on board  she has been struggling to complete HD sessions over the last 6 weeks and has becoming more and more frail and tachycardic on the machine and there is some concern she will not be able to continue with dialysis  Attempted dialysis again without any benzos to which pt became unresponsive  10/25 dialyzed successfully but needed BiPAP due to anxiety then was placed back on nasal canula  Palliative on board     Acute on chronic respiratory failure with hypoxia  Patient with Hypoxic Respiratory failure which is Acute on chronic.  she is not on home oxygen. Supplemental oxygen was provided and noted-    Multiple admissions for PNA and SOB   CXR Grossly stable appearing cardiopulmonary findings noting pleural effusions and bilateral basilar subsegmental atelectasis.  Developing left lower lung zone consolidation not excluded.    Procal 1.44, elevated from prior  Pulm and Nephro feel this is more due to volume overload due to issues receiving dialysis and fluid removal, L lung findings are less likely recurrent pneumonia  Multiple thoracenteses in the past     Hyperkalemia-resolved  Potassium stable     Goals of care, counseling/discussion  Advance Care Planning      Code Status  In light of the patients advanced and life limiting illness,I engaged the the  "patient in a voluntary conversation about the patient's preferences for care  at the very end of life. The patient wishes to have a natural, peaceful death.  Along those lines, the patient does not wish to have CPR or other invasive treatments performed when her heart and/or breathing stops. I communicated to the patient that a DNR form was completed and will be scanned into EPIC.    Palliative on board  Patient and family are asking about home dialysis. Case management sent referral to [facility].   She is appropriate candidate for hospice.      Community acquired pneumonia of left lower lobe of lung  S/p 5 days of treatment  Pneumonia ruled out  Leukocytosis most likely from steroids which was also stopped       Discharge Time: Greater than 30 minutes?    BP (!) 142/65 (BP Location: Right leg, Patient Position: Lying)   Pulse 100   Temp 97.9 °F (36.6 °C) (Oral)   Resp 20   Ht 5' 2" (1.575 m)   Wt 43.2 kg (95 lb 3.8 oz)   LMP  (LMP Unknown)   SpO2 (!) 92%   Breastfeeding No   BMI 17.42 kg/m²       Discharge Medications        Medication List        START taking these medications      bisacodyL 5 mg EC tablet  Commonly known as: DULCOLAX  Take 1 tablet (5 mg total) by mouth daily as needed for Constipation.     TRIPHROCAPS 1 mg Cap  Generic drug: vitamin renal formula (B-complex-vitamin c-folic acid)  Take 1 capsule by mouth once daily.            CHANGE how you take these medications      busPIRone 10 MG tablet  Commonly known as: BUSPAR  Take 1 tablet (10 mg total) by mouth once daily. On Mondays, Wednesdays, and Fridays  What changed: when to take this     * HYDROcodone-acetaminophen  mg per tablet  Commonly known as: NORCO  Take 1 tablet by mouth 3 (three) times daily as needed for Pain.  What changed: Another medication with the same name was added. Make sure you understand how and when to take each.     * HYDROcodone-acetaminophen  mg per tablet  Commonly known as: NORCO  Take 1 " tablet by mouth every 6 (six) hours as needed for Pain.  What changed: You were already taking a medication with the same name, and this prescription was added. Make sure you understand how and when to take each.     LORazepam 0.5 MG tablet  Commonly known as: ATIVAN  Take 1 tablet (0.5 mg total) by mouth every 6 (six) hours as needed for Anxiety (use 1-2 before dialysis for anxiety, bedtime).  What changed: when to take this     midodrine 5 MG Tab  Commonly known as: PROAMATINE  Take 3 tablets (15 mg total) by mouth 3 (three) times daily with meals.  What changed:   medication strength  how much to take  how to take this  when to take this           * This list has 2 medication(s) that are the same as other medications prescribed for you. Read the directions carefully, and ask your doctor or other care provider to review them with you.                CONTINUE taking these medications      albuterol 90 mcg/actuation inhaler  Commonly known as: PROVENTIL/VENTOLIN HFA  Inhale 2 puffs into the lungs every 6 (six) hours as needed for Wheezing. Rescue     albuterol-ipratropium 2.5 mg-0.5 mg/3 mL nebulizer solution  Commonly known as: DUO-NEB  Take 3 mLs by nebulization every 6 (six) hours as needed for Shortness of Breath. Rescue     azelastine 137 mcg (0.1 %) nasal spray  Commonly known as: ASTELIN  1 spray (137 mcg total) by Nasal route 2 (two) times daily as needed for Rhinitis.     cyproheptadine 4 mg tablet  Commonly known as: PERIACTIN     diclofenac sodium 1 % Gel  Commonly known as: VOLTAREN     furosemide 80 MG tablet  Commonly known as: LASIX  Take 1 tablet (80 mg total) by mouth 2 (two) times daily On non dialysis days Tuesday-Thursday- Saturday -Sunday     megestroL 20 MG Tab  Commonly known as: MEGACE  Take 1 tablet (20 mg total) by mouth 2 (two) times daily.     mirtazapine 7.5 MG Tab  Commonly known as: REMERON  Take 1 tablet (7.5 mg total) by mouth every evening.     ondansetron 4 mg/5 mL  solution  Commonly known as: ZOFRAN  Take 5 mLs (4 mg total) by mouth 2 (two) times daily as needed for Nausea.     RENAL-RADHA 0.8 mg Tab  Generic drug: B complex-vitamin C-folic acid     sevelamer carbonate 800 mg Tab  Commonly known as: RENVELA  Take 1 tablet by mouth 3 times a day     TRELEGY ELLIPTA 200-62.5-25 mcg inhaler  Generic drug: fluticasone-umeclidin-vilanter  Inhale 1 puff into the lungs once daily.            STOP taking these medications      HECTOROL IV     predniSONE 20 MG tablet  Commonly known as: DELTASONE     PulmosaL 7 % nebulizer solution  Generic drug: sodium chloride 7%               Where to Get Your Medications        These medications were sent to [facility] [place] [address], [place] [address]      Hours: Mon-Fri, 8a-5:30p Phone: [phone]   bisacodyL 5 mg EC tablet  HYDROcodone-acetaminophen  mg per tablet  LORazepam 0.5 MG tablet  midodrine 5 MG Tab  TRIPHROCAPS 1 mg Cap       Information about where to get these medications is not yet available    Ask your nurse or doctor about these medications  busPIRone 10 MG tablet         Discharge Information:   Diet:  Renal    Physical Activity:  As tolerated    Instructions:  1. Take all medications as prescribed  2. Keep all follow-up appointments  3. Return to the hospital or call your primary care physicians if any worsening symptoms such as fevers, chills, lethargy, SOB, chest pain or other concerns occur.      Follow-Up Appointments:  PCP in 1 week      Follow up items for PCP and tests that have not resulted at time of discharge:   None      Franchesca Tillman MD  [facility]

## 2024-10-18 NOTE — ASSESSMENT & PLAN NOTE
- chronic, stable  - continue home inhales and nebulizer  - continue supplemental O2 at 2L NC for SpO2 >88%     Spoke with pt and scheduled her 11/6 at 12pm.     Kelli GRANT RN  Dermatology Surgery  [phone]

## 2025-07-21 NOTE — ASSESSMENT & PLAN NOTE
Infusion Nursing Note:  Duane C Johnson presents today for PICC lab collection.    Patient seen by provider today: No    Intravenous Access:  PICC.  Labs drawn without difficulty.      Eduarda Schwartz RN   MWF-last HD on Friday full dialysis completed  Consult nephrology for HD management-appreciate recs HD on 10/09 and resume back to normal HD on MWF

## (undated) DEVICE — SEE MEDLINE ITEM 157187

## (undated) DEVICE — SEE MEDLINE ITEM 157173

## (undated) DEVICE — COVER PROBE US 5.5X58L NON LTX

## (undated) DEVICE — SUT PDSII 4-0 PS-2 CLEAR MO

## (undated) DEVICE — CATH PV .018

## (undated) DEVICE — GUIDEWIRE FIELDER XT.014X300CM

## (undated) DEVICE — BOOT SUTURE AID

## (undated) DEVICE — SPONGE DERMACEA GAUZE 4X4

## (undated) DEVICE — PACK BASIC

## (undated) DEVICE — SPONGE DERMACEA 4X4IN 12PLY

## (undated) DEVICE — TIE VAS SIL RUBBER MINI WHT ST

## (undated) DEVICE — SHEATH INTRODUCER 8FR 11CM

## (undated) DEVICE — GAUZE SPONGE 4X4 12PLY

## (undated) DEVICE — JELLY LUBRICANT STERILE 4 OZ

## (undated) DEVICE — SEE MEDLINE ITEM 152622

## (undated) DEVICE — SET DECANTER MEDICHOICE

## (undated) DEVICE — SEE MEDLINE ITEM 157117

## (undated) DEVICE — COVER OVERHEAD SURG LT BLUE

## (undated) DEVICE — CATH NAVICROSS .035X90 STR

## (undated) DEVICE — SEE MEDLINE ITEM 157131

## (undated) DEVICE — SUT VICRYL 3-0 27 SH

## (undated) DEVICE — SUT 4-0 ETHILON 18 PS-2

## (undated) DEVICE — BLADE SURG CARBON STEEL SZ11

## (undated) DEVICE — WIRE MANDRIL 98/60CM

## (undated) DEVICE — INFLATOR ENCORE 26 BLLN INFL

## (undated) DEVICE — CONTRAST VISIPAQUE 150ML

## (undated) DEVICE — GLOVE BIOGEL ECLIPSE SZ 7.5

## (undated) DEVICE — PAD PREP 50/CA

## (undated) DEVICE — SYR B-D DISP CONTROL 10CC100/C

## (undated) DEVICE — SEE MEDLINE ITEM 156955

## (undated) DEVICE — SHEATH BRITE TIP 6FR 5.5CM

## (undated) DEVICE — TIE VAS SIL RUBBER MAXI BLU ST

## (undated) DEVICE — GUIDEWIRE STD .035X260CM ANG

## (undated) DEVICE — MANIFOLD 4 PORT

## (undated) DEVICE — SYR 30CC LUER LOCK

## (undated) DEVICE — DRESSING XEROFORM FOIL PK 1X8

## (undated) DEVICE — CANNULA VSL W/DUCKBILL

## (undated) DEVICE — NDL 22GA X1 1/2 REG BEVEL

## (undated) DEVICE — ELECTRODE REM PLYHSV RETURN 9

## (undated) DEVICE — SYR LUER LOCK 1CC

## (undated) DEVICE — TUBING ARTRL PRESS MNTR M-F 4

## (undated) DEVICE — APPLICATOR CHLORAPREP ORN 26ML

## (undated) DEVICE — CATH EMB FOGARTY 5.5FRX40CM

## (undated) DEVICE — GUIDEWIRE EMERALD .035IN 260CM

## (undated) DEVICE — STOCKINET TUBULAR 1 PLY 6X60IN

## (undated) DEVICE — SUT PROLENE 6-0 24 BV-1

## (undated) DEVICE — GOWN POLY REINF BRTH SLV LG

## (undated) DEVICE — GAUZE AVANT SPNG 4PLY STRL 4X4

## (undated) DEVICE — CATH ULTRAVERSE 035 5X40X75

## (undated) DEVICE — INSTRUMENT SUCTION FRAZIER 12F

## (undated) DEVICE — ADAPTER CATH SYRINGE

## (undated) DEVICE — SHEET THYROID W/ISO-BAC

## (undated) DEVICE — PAD DEFIB CADENCE ADULT R2

## (undated) DEVICE — TOWEL OR DISP STRL BLUE 4/PK

## (undated) DEVICE — SHEATH BRITE TIP 7FR 5.5CM

## (undated) DEVICE — CATH FOGARTY EMB 4FR 40CM

## (undated) DEVICE — VISE RADIFOCUS MULTI TORQUE

## (undated) DEVICE — SET MICRO PUNCT 4FR/MPIS-401

## (undated) DEVICE — KIT LEFT HEART MANIFOLD CUSTOM

## (undated) DEVICE — DRAPE THREE-QTR REINF 53X77IN

## (undated) DEVICE — SEE MEDLINE ITEM 157116

## (undated) DEVICE — DRESSING ANTIMICROBIAL 1 INCH

## (undated) DEVICE — DRESSING XEROFORM 1X8IN

## (undated) DEVICE — SUT PROLENE 5-0 36IN C-1

## (undated) DEVICE — COVER PROBE 6X48

## (undated) DEVICE — APPLIER LIGACLIP SM 9.38IN

## (undated) DEVICE — SUT 2-0 ETHILON 18 FS

## (undated) DEVICE — GUIDEWIRE STD .035X180CM ANG

## (undated) DEVICE — DRESSING TRANS 4X4 3/4

## (undated) DEVICE — DRAPE HAND STERILE

## (undated) DEVICE — CATH ANGIOJET OMNI-120CM

## (undated) DEVICE — SYR 10CC LUER LOCK

## (undated) DEVICE — CATH CONQUEST 40 6X4X75

## (undated) DEVICE — DEVICE PICC SECURE SORBA VIEW

## (undated) DEVICE — CATH FOGARTY EMB 3FR 40CC

## (undated) DEVICE — GLIDESHEATH SLENDER SS 5FR10CM

## (undated) DEVICE — BANDAGE KERLIX AMD

## (undated) DEVICE — Device

## (undated) DEVICE — DRAPE C-ARM/MOBILE XRAY 44X80

## (undated) DEVICE — GOWN POLY REINF BRTH SLV XL